# Patient Record
Sex: FEMALE | Race: WHITE | NOT HISPANIC OR LATINO | Employment: OTHER | URBAN - METROPOLITAN AREA
[De-identification: names, ages, dates, MRNs, and addresses within clinical notes are randomized per-mention and may not be internally consistent; named-entity substitution may affect disease eponyms.]

---

## 2017-11-21 ENCOUNTER — HOSPITAL ENCOUNTER (EMERGENCY)
Facility: HOSPITAL | Age: 72
Discharge: HOME | End: 2017-11-21
Attending: EMERGENCY MEDICINE | Admitting: EMERGENCY MEDICINE
Payer: MEDICARE

## 2017-11-21 ENCOUNTER — APPOINTMENT (EMERGENCY)
Dept: RADIOLOGY | Facility: HOSPITAL | Age: 72
End: 2017-11-21
Payer: MEDICARE

## 2017-11-21 VITALS
RESPIRATION RATE: 18 BRPM | OXYGEN SATURATION: 94 % | TEMPERATURE: 97.9 F | BODY MASS INDEX: 38.18 KG/M2 | DIASTOLIC BLOOD PRESSURE: 93 MMHG | SYSTOLIC BLOOD PRESSURE: 190 MMHG | HEART RATE: 68 BPM | WEIGHT: 194.45 LBS | HEIGHT: 60 IN

## 2017-11-21 DIAGNOSIS — R07.89 CHEST WALL PAIN: Primary | ICD-10-CM

## 2017-11-21 DIAGNOSIS — J40 BRONCHITIS: ICD-10-CM

## 2017-11-21 LAB
ALBUMIN SERPL BCP-MCNC: 3.8 G/DL (ref 3.5–5)
ALP SERPL-CCNC: 55 U/L (ref 46–116)
ALT SERPL W P-5'-P-CCNC: 20 U/L (ref 12–78)
ANION GAP SERPL CALCULATED.3IONS-SCNC: 11 MMOL/L (ref 4–13)
AST SERPL W P-5'-P-CCNC: 12 U/L (ref 5–45)
BASOPHILS # BLD AUTO: 0.03 THOUSANDS/ΜL (ref 0–0.1)
BASOPHILS NFR BLD AUTO: 1 % (ref 0–1)
BILIRUB DIRECT SERPL-MCNC: 0.12 MG/DL (ref 0–0.2)
BILIRUB SERPL-MCNC: 0.2 MG/DL (ref 0.2–1)
BUN SERPL-MCNC: 32 MG/DL (ref 5–25)
CALCIUM SERPL-MCNC: 9.5 MG/DL (ref 8.3–10.1)
CHLORIDE SERPL-SCNC: 109 MMOL/L (ref 100–108)
CO2 SERPL-SCNC: 26 MMOL/L (ref 21–32)
CREAT SERPL-MCNC: 1.43 MG/DL (ref 0.6–1.3)
EOSINOPHIL # BLD AUTO: 0.28 THOUSAND/ΜL (ref 0–0.61)
EOSINOPHIL NFR BLD AUTO: 5 % (ref 0–6)
ERYTHROCYTE [DISTWIDTH] IN BLOOD BY AUTOMATED COUNT: 12 % (ref 11.6–15.1)
GFR SERPL CREATININE-BSD FRML MDRD: 37 ML/MIN/1.73SQ M
GLUCOSE SERPL-MCNC: 117 MG/DL (ref 65–140)
HCT VFR BLD AUTO: 38.2 % (ref 34.8–46.1)
HGB BLD-MCNC: 12.6 G/DL (ref 11.5–15.4)
INR PPP: 1.01 (ref 0.86–1.16)
LYMPHOCYTES # BLD AUTO: 2.22 THOUSANDS/ΜL (ref 0.6–4.47)
LYMPHOCYTES NFR BLD AUTO: 40 % (ref 14–44)
MCH RBC QN AUTO: 33.2 PG (ref 26.8–34.3)
MCHC RBC AUTO-ENTMCNC: 33 G/DL (ref 31.4–37.4)
MCV RBC AUTO: 101 FL (ref 82–98)
MONOCYTES # BLD AUTO: 0.53 THOUSAND/ΜL (ref 0.17–1.22)
MONOCYTES NFR BLD AUTO: 9 % (ref 4–12)
NEUTROPHILS # BLD AUTO: 2.52 THOUSANDS/ΜL (ref 1.85–7.62)
NEUTS SEG NFR BLD AUTO: 45 % (ref 43–75)
NRBC BLD AUTO-RTO: 0 /100 WBCS
PLATELET # BLD AUTO: 134 THOUSANDS/UL (ref 149–390)
PMV BLD AUTO: 11.5 FL (ref 8.9–12.7)
POTASSIUM SERPL-SCNC: 3.9 MMOL/L (ref 3.5–5.3)
PROT SERPL-MCNC: 7.6 G/DL (ref 6.4–8.2)
PROTHROMBIN TIME: 13.6 SECONDS (ref 12.1–14.4)
RBC # BLD AUTO: 3.8 MILLION/UL (ref 3.81–5.12)
SODIUM SERPL-SCNC: 146 MMOL/L (ref 136–145)
TROPONIN I SERPL-MCNC: <0.02 NG/ML
TROPONIN I SERPL-MCNC: <0.02 NG/ML
WBC # BLD AUTO: 5.61 THOUSAND/UL (ref 4.31–10.16)

## 2017-11-21 PROCEDURE — 80048 BASIC METABOLIC PNL TOTAL CA: CPT | Performed by: EMERGENCY MEDICINE

## 2017-11-21 PROCEDURE — 71020 HB CHEST X-RAY 2VW FRONTAL&LATL: CPT

## 2017-11-21 PROCEDURE — 85025 COMPLETE CBC W/AUTO DIFF WBC: CPT | Performed by: EMERGENCY MEDICINE

## 2017-11-21 PROCEDURE — 80076 HEPATIC FUNCTION PANEL: CPT | Performed by: EMERGENCY MEDICINE

## 2017-11-21 PROCEDURE — 93005 ELECTROCARDIOGRAM TRACING: CPT | Performed by: EMERGENCY MEDICINE

## 2017-11-21 PROCEDURE — 99285 EMERGENCY DEPT VISIT HI MDM: CPT

## 2017-11-21 PROCEDURE — 36415 COLL VENOUS BLD VENIPUNCTURE: CPT | Performed by: EMERGENCY MEDICINE

## 2017-11-21 PROCEDURE — 84484 ASSAY OF TROPONIN QUANT: CPT | Performed by: EMERGENCY MEDICINE

## 2017-11-21 PROCEDURE — 85610 PROTHROMBIN TIME: CPT | Performed by: EMERGENCY MEDICINE

## 2017-11-21 RX ORDER — AZITHROMYCIN 200 MG/5ML
500 POWDER, FOR SUSPENSION ORAL ONCE
Status: COMPLETED | OUTPATIENT
Start: 2017-11-21 | End: 2017-11-21

## 2017-11-21 RX ORDER — AZITHROMYCIN 250 MG/1
250 TABLET, FILM COATED ORAL DAILY
Qty: 4 TABLET | Refills: 0 | Status: SHIPPED | OUTPATIENT
Start: 2017-11-21 | End: 2017-11-25

## 2017-11-21 RX ORDER — CLONIDINE HYDROCHLORIDE 0.1 MG/1
0.1 TABLET ORAL ONCE
Status: COMPLETED | OUTPATIENT
Start: 2017-11-21 | End: 2017-11-21

## 2017-11-21 RX ADMIN — AZITHROMYCIN 500 MG: 200 POWDER, FOR SUSPENSION ORAL at 17:41

## 2017-11-21 RX ADMIN — CLONIDINE HYDROCHLORIDE 0.1 MG: 0.1 TABLET ORAL at 17:36

## 2017-11-21 NOTE — DISCHARGE INSTRUCTIONS
Acute Bronchitis   WHAT YOU NEED TO KNOW:   Acute bronchitis is swelling and irritation in the air passages of your lungs  This irritation may cause you to cough or have other breathing problems  Acute bronchitis often starts because of another illness, such as a cold or the flu  The illness spreads from your nose and throat to your windpipe and airways  Bronchitis is often called a chest cold  Acute bronchitis lasts about 3 to 6 weeks and is usually not a serious illness  Your cough can last for several weeks  DISCHARGE INSTRUCTIONS:   Return to the emergency department if:   · You cough up blood  · Your lips or fingernails turn blue  · You feel like you are not getting enough air when you breathe  Contact your healthcare provider if:   · You have a fever  · Your breathing problems do not go away or get worse  · Your cough does not get better within 4 weeks  · You have questions or concerns about your condition or care  Self-care:   · Get more rest   Rest helps your body to heal  Slowly start to do more each day  Rest when you feel it is needed  · Avoid irritants in the air  Avoid chemicals, fumes, and dust  Wear a face mask if you must work around dust or fumes  Stay inside on days when air pollution levels are high  If you have allergies, stay inside when pollen counts are high  Do not use aerosol products, such as spray-on deodorant, bug spray, and hair spray  · Do not smoke or be around others who smoke  Nicotine and other chemicals in cigarettes and cigars damages the cilia that move mucus out of your lungs  Ask your healthcare provider for information if you currently smoke and need help to quit  E-cigarettes or smokeless tobacco still contain nicotine  Talk to your healthcare provider before you use these products  · Drink liquids as directed  Liquids help keep your air passages moist and help you cough up mucus   You may need to drink more liquids when you have acute bronchitis  Ask how much liquid to drink each day and which liquids are best for you  · Use a humidifier or vaporizer  Use a cool mist humidifier or a vaporizer to increase air moisture in your home  This may make it easier for you to breathe and help decrease your cough  Decrease risk for acute bronchitis:   · Get the vaccinations you need  Ask your healthcare provider if you should get vaccinated against the flu or pneumonia  · Prevent the spread of germs  You can decrease your risk of acute bronchitis and other illnesses by doing the following:     [facility] [facility] your hands often with soap and water  Carry germ-killing hand lotion or gel with you  You can use the lotion or gel to clean your hands when soap and water are not available  ¨ Do not touch your eyes, nose, or mouth unless you have washed your hands first     ¨ Always cover your mouth when you cough to prevent the spread of germs  It is best to cough into a tissue or your shirt sleeve instead of into your hand  Ask those around you cover their mouths when they cough  ¨ Try to avoid people who have a cold or the flu  If you are sick, stay away from others as much as possible  Medicines: Your healthcare provider may  give you any of the following:  · Ibuprofen or acetaminophen  are medicines that help lower your fever  They are available without a doctor's order  Ask your healthcare provider which medicine is right for you  Ask how much to take and how often to take it  Follow directions  These medicines can cause stomach bleeding if not taken correctly  Ibuprofen can cause kidney damage  Do not take ibuprofen if you have kidney disease, an ulcer, or allergies to aspirin  Acetaminophen can cause liver damage  Do not take more than 4,000 milligrams in 24 hours  · Decongestants  help loosen mucus in your lungs and make it easier to cough up  This can help you breathe easier  · Cough suppressants  decrease your urge to cough   If your cough produces mucus, do not take a cough suppressant unless your healthcare provider tells you to  Your healthcare provider may suggest that you take a cough suppressant at night so you can rest     · Inhalers  may be given  Your healthcare provider may give you one or more inhalers to help you breathe easier and cough less  An inhaler gives your medicine to open your airways  Ask your healthcare provider to show you how to use your inhaler correctly  · Take your medicine as directed  Contact your healthcare provider if you think your medicine is not helping or if you have side effects  Tell him of her if you are allergic to any medicine  Keep a list of the medicines, vitamins, and herbs you take  Include the amounts, and when and why you take them  Bring the list or the pill bottles to follow-up visits  Carry your medicine list with you in case of an emergency  Follow up with your healthcare provider as directed:  Write down questions you have so you will remember to ask them during your follow-up visits  © 2017 [address] [employer] Information is for End User's use only and may not be sold, redistributed or otherwise used for commercial purposes  All illustrations and images included in CareNotes® are the copyrighted property of [employer]  or Ronald King  The above information is an  only  It is not intended as medical advice for individual conditions or treatments  Talk to your doctor, nurse or pharmacist before following any medical regimen to see if it is safe and effective for you  Chest Wall Pain   WHAT YOU NEED TO KNOW:   Chest wall pain may be caused by problems with the muscles, cartilage, or bones of the chest wall  Chest wall pain may also be caused by pain that spreads to your chest from another part of your body  The pain may be aching, severe, dull, or sharp  It may come and go, or it may be constant   The pain may be worse when you move in certain ways, breathe deeply, or cough  DISCHARGE INSTRUCTIONS:   Call 911 if:   · You have any of the following signs of a heart attack:      ¨ Squeezing, pressure, or pain in your chest that lasts longer than 5 minutes or returns    ¨ Discomfort or pain in your back, neck, jaw, stomach, or arm     ¨ Trouble breathing    ¨ Nausea or vomiting    ¨ Lightheadedness or a sudden cold sweat, especially with chest pain or trouble breathing    Return to the emergency department if:   · You have severe pain  Contact your healthcare provider if:   · You develop a rash  · You have other new symptoms  · Your pain does not improve, even with treatment  · You have questions or concerns about your condition or care  Medicines: You may need any of the following:  · NSAIDs , such as ibuprofen, help decrease swelling, pain, and fever  This medicine is available with or without a doctor's order  NSAIDs can cause stomach bleeding or kidney problems in certain people  If you take blood thinner medicine, always ask your healthcare provider if NSAIDs are safe for you  Always read the medicine label and follow directions  · Acetaminophen  decreases pain  It is available without a doctor's order  Ask how much to take and how often to take it  Follow directions  Acetaminophen can cause liver damage if not taken correctly  · A cream  may be applied to your chest to decrease pain  · Take your medicine as directed  Contact your healthcare provider if you think your medicine is not helping or if you have side effects  Tell him of her if you are allergic to any medicine  Keep a list of the medicines, vitamins, and herbs you take  Include the amounts, and when and why you take them  Bring the list or the pill bottles to follow-up visits  Carry your medicine list with you in case of an emergency  Follow up with your healthcare provider as directed:  Write down your questions so you remember to ask them during your visits  Self-care:   · Rest  as needed  Avoid activities that make your chest wall pain worse  · Apply heat  on your chest for 20 to 30 minutes every 2 hours for as many days as directed  Heat helps decrease pain and muscle spasms  · Apply ice  on your chest for 15 to 20 minutes every hour or as directed  Use an ice pack, or put crushed ice in a plastic bag  Cover it with a towel  Ice helps prevent tissue damage and decreases swelling and pain  © 2017 [address] [employer]  Information is for End User's use only and may not be sold, redistributed or otherwise used for commercial purposes  All illustrations and images included in CareNotes® are the copyrighted property of A [employer] A M , Inc  or Ronald King  The above information is an  only  It is not intended as medical advice for individual conditions or treatments  Talk to your doctor, nurse or pharmacist before following any medical regimen to see if it is safe and effective for you

## 2017-11-21 NOTE — ED PROVIDER NOTES
History  Chief Complaint   Patient presents with    Chest Pain     New onset, began 12 today  Pt has cardiac hx  HPI   Patient is a 81-year-old female, history of 3 heart attacks, 5 stents, complains of anterior chest pain that started approximately 245 today  Patient is at [facility] daughter reports patient has some dementia  Patient reports her pain is anterior, somewhat different than her normal chest pain  Patient's daughter reports that she is coughing fairly frequently, apparently recently started on a nebulizer because the frequent coughing with some wheezing  Apparently blood pressure was elevated today, she had some anterior chest pain  The patient has dementia and the family expressed difficult in understanding if her pain is  ischemic or musculoskeletal   Advised to come to the emergency department by the nursing home  Past medical history of coronary artery disease with stents  Family history noncontributory  Social history, lives at a nursing home    None       Past Medical History:   Diagnosis Date    Brain aneurysm     Cardiac disease     [facility]     [facility] [address] [facility]     Dementia     Disease of thyroid gland     Hyperlipidemia     Hypertension     MI (myocardial infarction)     Migraine     Renal disorder     [facility]     [facility]        Past Surgical History:   Procedure Laterality Date    APPENDECTOMY      BLADDER TUMOR EXCISION      BRAIN SURGERY      CARDIAC SURGERY      CORONARY STENT PLACEMENT      5 stents    EYE SURGERY      MANDIBLE FRACTURE SURGERY      TONSILLECTOMY      TUBAL LIGATION      UTERINE FIBROID SURGERY         History reviewed  No pertinent family history  I have reviewed and agree with the history as documented      Social History   Substance Use Topics    Smoking status: Former Smoker    Smokeless tobacco: Never Used    Alcohol use No        Review of Systems   Constitutional: Negative for diaphoresis, fatigue and fever  HENT: Negative for congestion, ear pain, nosebleeds and sore throat  Eyes: Negative for photophobia, pain, discharge and visual disturbance  Respiratory: Negative for cough, choking, chest tightness, shortness of breath and wheezing  Cardiovascular: Positive for chest pain  Negative for palpitations  Gastrointestinal: Negative for abdominal distention, abdominal pain, diarrhea and vomiting  Genitourinary: Negative for dysuria, flank pain and frequency  Musculoskeletal: Negative for back pain, gait problem and joint swelling  Skin: Negative for color change and rash  Neurological: Negative for dizziness, syncope and headaches  Psychiatric/Behavioral: Negative for behavioral problems and confusion  The patient is not nervous/anxious  All other systems reviewed and are negative  Physical Exam  ED Triage Vitals [11/21/17 1550]   Temperature Pulse Respirations Blood Pressure SpO2   97 9 °F (36 6 °C) 66 19 (!) 219/102 91 %      Temp Source Heart Rate Source Patient Position - Orthostatic VS BP Location FiO2 (%)   Oral Monitor Lying Right arm --      Pain Score       No Pain           Orthostatic Vital Signs  Vitals:    11/21/17 1550 11/21/17 1736 11/21/17 1852   BP: (!) 219/102 (!) 239/108 (!) 190/93   Pulse: 66  68   Patient Position - Orthostatic VS: Lying  Sitting       Physical Exam   Constitutional: She appears well-developed and well-nourished  HENT:   Head: Normocephalic  Right Ear: External ear normal    Left Ear: External ear normal    Nose: Nose normal    Mouth/Throat: Oropharynx is clear and moist    Eyes: EOM and lids are normal  Pupils are equal, round, and reactive to light  Neck: Normal range of motion  Neck supple  Cardiovascular: Normal rate, regular rhythm, normal heart sounds and intact distal pulses  Pulmonary/Chest: Effort normal and breath sounds normal  No respiratory distress  Abdominal: Soft  Bowel sounds are normal  She exhibits no distension  There is no tenderness  Musculoskeletal: Normal range of motion  She exhibits no deformity  Neurological: She is alert  She has normal strength  No cranial nerve deficit or sensory deficit  Oriented to person and place but not time   Skin: Skin is warm and dry  Psychiatric: She has a normal mood and affect  Nursing note and vitals reviewed  Pulse oximetry 94% on room air adequate oxygenation, no hypoxia    ED Medications  Medications   cloNIDine (CATAPRES) tablet 0 1 mg (0 1 mg Oral Given 11/21/17 1736)   azithromycin (ZITHROMAX) oral suspension 500 mg (500 mg Oral Given 11/21/17 1741)       Diagnostic Studies  Results Reviewed     Procedure Component Value Units Date/Time    Troponin I [[id number]]  (Normal) Collected:  11/21/17 1748    Lab Status:  Final result Specimen:  Blood from Arm, Left Updated:  11/21/17 1811     Troponin I <0 02 ng/mL     Narrative:         [employer] Chemistry analyzer 99% cutoff is > 0 04 ng/mL in network labs    o cTnI 99% cutoff is useful only when applied to patients in the clinical setting of myocardial ischemia  o cTnI 99% cutoff should be interpreted in the context of clinical history, ECG findings and possibly cardiac imaging to establish correct diagnosis  o cTnI 99% cutoff may be suggestive but clearly not indicative of a coronary event without the clinical setting of myocardial ischemia      CBC and differential [[id number]]  (Abnormal) Collected:  11/21/17 1640    Lab Status:  Final result Specimen:  Blood from Arm, Left Updated:  11/21/17 1647     WBC 5 61 Thousand/uL      RBC 3 80 (L) Million/uL      Hemoglobin 12 6 g/dL      Hematocrit 38 2 %       (H) fL      MCH 33 2 pg      MCHC 33 0 g/dL      RDW 12 0 %      MPV 11 5 fL      Platelets 528 (L) Thousands/uL      nRBC 0 /100 WBCs      Neutrophils Relative 45 %      Lymphocytes Relative 40 %      Monocytes Relative 9 %      Eosinophils Relative 5 %      Basophils Relative 1 %      Neutrophils Absolute 2 52 Thousands/µL      Lymphocytes Absolute 2 22 Thousands/µL      Monocytes Absolute 0 53 Thousand/µL      Eosinophils Absolute 0 28 Thousand/µL      Basophils Absolute 0 03 Thousands/µL     Troponin I [[id number]]  (Normal) Collected:  11/21/17 1605    Lab Status:  Final result Specimen:  Blood from Arm, Right Updated:  11/21/17 1636     Troponin I <0 02 ng/mL     Narrative:         [employer] Chemistry analyzer 99% cutoff is > 0 04 ng/mL in network labs    o cTnI 99% cutoff is useful only when applied to patients in the clinical setting of myocardial ischemia  o cTnI 99% cutoff should be interpreted in the context of clinical history, ECG findings and possibly cardiac imaging to establish correct diagnosis  o cTnI 99% cutoff may be suggestive but clearly not indicative of a coronary event without the clinical setting of myocardial ischemia  Basic metabolic panel [[id number]]  (Abnormal) Collected:  11/21/17 1605    Lab Status:  Final result Specimen:  Blood from Arm, Right Updated:  11/21/17 1631     Sodium 146 (H) mmol/L      Potassium 3 9 mmol/L      Chloride 109 (H) mmol/L      CO2 26 mmol/L      Anion Gap 11 mmol/L      BUN 32 (H) mg/dL      Creatinine 1 43 (H) mg/dL      Glucose 117 mg/dL      Calcium 9 5 mg/dL      eGFR 37 ml/min/1 73sq m     Narrative:         National Kidney Disease Education Program recommendations are as follows:  GFR calculation is accurate only with a steady state creatinine  Chronic Kidney disease less than 60 ml/min/1 73 sq  meters  Kidney failure less than 15 ml/min/1 73 sq  meters      Hepatic function panel [[id number]]  (Normal) Collected:  11/21/17 1605    Lab Status:  Final result Specimen:  Blood from Arm, Right Updated:  11/21/17 1631     Total Bilirubin 0 20 mg/dL      Bilirubin, Direct 0 12 mg/dL      Alkaline Phosphatase 55 U/L      AST 12 U/L      ALT 20 U/L      Total Protein 7 6 g/dL      Albumin 3 8 g/dL Protime-INR [[id number]]  (Normal) Collected:  11/21/17 1605    Lab Status:  Final result Specimen:  Blood from Arm, Right Updated:  11/21/17 1625     Protime 13 6 seconds      INR 1 01                 XR chest pa & lateral   Final Result by Chana Valdez MD (11/21 1652)   Strand-like opacity at left lung base most consistent with subsegmental atelectasis      Findings personally discussed by phone with Dr Lange Pap at 4:50 PM, 11/21/2017         Workstation performed: [id number]                    Procedures  ECG 12 Lead Documentation  Date/Time: 11/21/2017 4:00 PM  Performed by: Garry Ramon by: Oziel Foster     Indications / Diagnosis:  Chest pain  ECG reviewed by me, the ED Provider: yes    Patient location:  ED  Previous ECG:     Previous ECG:  Unavailable  Interpretation:     Interpretation: non-specific    Rate:     ECG rate:  Sixty-eight  Rhythm:     Rhythm: sinus rhythm    Ectopy:     Ectopy: none    ST segments:     ST segments:  Non-specific  Comments:      Normal sinus rhythm, no acute ST-T wave changes, no old tracings           Phone Contacts  ED Phone Contact    ED Course  ED Course          HEART Risk Score    Flowsheet Row Most Recent Value   History  0 Filed at: 11/21/2017 2114   ECG  0 Filed at: 11/21/2017 2114   Age  2 Filed at: 11/21/2017 2114   Risk Factors  2 Filed at: 11/21/2017 2114   Troponin  0 Filed at: 11/21/2017 2114   Heart Score Risk Calculator   History  0 Filed at: 11/21/2017 2114   ECG  0 Filed at: 11/21/2017 2114   Age  2 Filed at: 11/21/2017 2114   Risk Factors  2 Filed at: 11/21/2017 2114   Troponin  0 Filed at: 11/21/2017 2114   HEART Score  4 Filed at: 11/21/2017 2114   HEART Score  4 Filed at: 11/21/2017 2114           diagnostic testing showed normal white count of 5 6  No sign of inflammation, hemoglobin was normal at 12 no sign of anemia  Cardiac troponin was normal and repeat cardiac troponin at 3 hours   post pain was normal no sign of cardiac ischemia  Chest x-ray showed a possible left lower lobe infiltrate versus atelectasis, reviewed with the daughter, we start the patient on antibiotics  Patient was hypertensive, diastolic blood pressure 93 on discharge  I discussed with the daughter, they will continue her hypertensive medications  MDM medical decision making 52-year-old female presents with some anterior chest pain recent cough and congestion, no sign of cardiac ischemia can't EKG showed no acute ST elevations, cardiac troponin was negative no sign of cardiac ischemia  Discussed with radiology, no definite pneumonia atelectasis at the left base, discussed with the daughter also  Will treat with antibiotics  Blood pressure seems to be somewhat improved on discharge  Discussed with the daughter she will follow up  We discussed indications to return and follow-up  No indication for admission at this time she agrees  CritCare Time    Disposition  Final diagnoses:   Chest wall pain   Bronchitis     Time reflects when diagnosis was documented in both MDM as applicable and the Disposition within this note     Time User Action Codes Description Comment    11/21/2017  6:04 PM Farhana Negrete [R07 [id number]] Chest wall pain     11/21/2017  6:04 PM Deidre Venegas Cir Bronchitis       ED Disposition     ED Disposition Condition Comment    Discharge  [facility] discharge to home/self care  Condition at discharge: Good        Follow-up Information     Follow up With Specialties Details Why Contact Savannah Barragan, [address] [phone]          Discharge Medication List as of 11/21/2017  6:28 PM      START taking these medications    Details   azithromycin (ZITHROMAX) 250 mg tablet Take 1 tablet by mouth daily for 4 days, Starting Tue 11/21/2017, Until Sat 11/25/2017, Print           No discharge procedures on file      ED Provider  Electronically Signed by           Jayda Vernon MD  11/21/17 9079

## 2017-11-22 LAB
ATRIAL RATE: 68 BPM
P AXIS: 70 DEGREES
PR INTERVAL: 188 MS
QRS AXIS: 31 DEGREES
QRSD INTERVAL: 102 MS
QT INTERVAL: 440 MS
QTC INTERVAL: 467 MS
T WAVE AXIS: 56 DEGREES
VENTRICULAR RATE: 68 BPM

## 2017-12-10 ENCOUNTER — APPOINTMENT (EMERGENCY)
Dept: CT IMAGING | Facility: HOSPITAL | Age: 72
DRG: 698 | End: 2017-12-10
Payer: MEDICARE

## 2017-12-10 ENCOUNTER — HOSPITAL ENCOUNTER (INPATIENT)
Facility: HOSPITAL | Age: 72
LOS: 4 days | Discharge: HOME | DRG: 698 | End: 2017-12-14
Attending: EMERGENCY MEDICINE | Admitting: INTERNAL MEDICINE
Payer: MEDICARE

## 2017-12-10 DIAGNOSIS — N28.9 ACUTE ON CHRONIC RENAL INSUFFICIENCY: ICD-10-CM

## 2017-12-10 DIAGNOSIS — R77.8 ELEVATED TROPONIN: ICD-10-CM

## 2017-12-10 DIAGNOSIS — I10 UNCONTROLLED HYPERTENSION: ICD-10-CM

## 2017-12-10 DIAGNOSIS — N18.9 ACUTE ON CHRONIC RENAL INSUFFICIENCY: ICD-10-CM

## 2017-12-10 DIAGNOSIS — R10.2 SUPRAPUBIC PAIN: ICD-10-CM

## 2017-12-10 DIAGNOSIS — Z97.8 CHRONIC INDWELLING FOLEY CATHETER: ICD-10-CM

## 2017-12-10 DIAGNOSIS — N30.00 ACUTE CYSTITIS: Primary | ICD-10-CM

## 2017-12-10 PROBLEM — J44.9 COPD (CHRONIC OBSTRUCTIVE PULMONARY DISEASE) (HCC): Chronic | Status: ACTIVE | Noted: 2017-12-10

## 2017-12-10 PROBLEM — N30.90 CYSTITIS: Status: ACTIVE | Noted: 2017-12-10

## 2017-12-10 PROBLEM — N17.9 AKI (ACUTE KIDNEY INJURY) (HCC): Status: ACTIVE | Noted: 2017-12-10

## 2017-12-10 PROBLEM — I50.9 CHF (CONGESTIVE HEART FAILURE) (HCC): Chronic | Status: ACTIVE | Noted: 2017-12-10

## 2017-12-10 LAB
ALBUMIN SERPL BCP-MCNC: 3.6 G/DL (ref 3.5–5)
ALP SERPL-CCNC: 55 U/L (ref 46–116)
ALT SERPL W P-5'-P-CCNC: 18 U/L (ref 12–78)
ANION GAP SERPL CALCULATED.3IONS-SCNC: 11 MMOL/L (ref 4–13)
APTT PPP: 25 SECONDS (ref 23–35)
AST SERPL W P-5'-P-CCNC: 14 U/L (ref 5–45)
BACTERIA UR QL AUTO: ABNORMAL /HPF
BASOPHILS # BLD AUTO: 0.03 THOUSANDS/ΜL (ref 0–0.1)
BASOPHILS NFR BLD AUTO: 0 % (ref 0–1)
BILIRUB SERPL-MCNC: 0.2 MG/DL (ref 0.2–1)
BILIRUB UR QL STRIP: NEGATIVE
BUN SERPL-MCNC: 31 MG/DL (ref 5–25)
CALCIUM SERPL-MCNC: 9.8 MG/DL (ref 8.3–10.1)
CHLORIDE SERPL-SCNC: 109 MMOL/L (ref 100–108)
CLARITY UR: CLEAR
CO2 SERPL-SCNC: 27 MMOL/L (ref 21–32)
COLOR UR: YELLOW
CREAT SERPL-MCNC: 1.73 MG/DL (ref 0.6–1.3)
EOSINOPHIL # BLD AUTO: 0.3 THOUSAND/ΜL (ref 0–0.61)
EOSINOPHIL NFR BLD AUTO: 4 % (ref 0–6)
ERYTHROCYTE [DISTWIDTH] IN BLOOD BY AUTOMATED COUNT: 12.2 % (ref 11.6–15.1)
GFR SERPL CREATININE-BSD FRML MDRD: 29 ML/MIN/1.73SQ M
GLUCOSE SERPL-MCNC: 166 MG/DL (ref 65–140)
GLUCOSE UR STRIP-MCNC: NEGATIVE MG/DL
HCT VFR BLD AUTO: 39.6 % (ref 34.8–46.1)
HGB BLD-MCNC: 12.9 G/DL (ref 11.5–15.4)
HGB UR QL STRIP.AUTO: ABNORMAL
INR PPP: 1.05 (ref 0.86–1.16)
KETONES UR STRIP-MCNC: NEGATIVE MG/DL
LACTATE SERPL-SCNC: 1.3 MMOL/L (ref 0.5–2)
LEUKOCYTE ESTERASE UR QL STRIP: ABNORMAL
LIPASE SERPL-CCNC: 232 U/L (ref 73–393)
LYMPHOCYTES # BLD AUTO: 1.83 THOUSANDS/ΜL (ref 0.6–4.47)
LYMPHOCYTES NFR BLD AUTO: 22 % (ref 14–44)
MAGNESIUM SERPL-MCNC: 2 MG/DL (ref 1.6–2.6)
MCH RBC QN AUTO: 33.1 PG (ref 26.8–34.3)
MCHC RBC AUTO-ENTMCNC: 32.6 G/DL (ref 31.4–37.4)
MCV RBC AUTO: 102 FL (ref 82–98)
MONOCYTES # BLD AUTO: 0.79 THOUSAND/ΜL (ref 0.17–1.22)
MONOCYTES NFR BLD AUTO: 10 % (ref 4–12)
NEUTROPHILS # BLD AUTO: 5.3 THOUSANDS/ΜL (ref 1.85–7.62)
NEUTS SEG NFR BLD AUTO: 64 % (ref 43–75)
NITRITE UR QL STRIP: POSITIVE
NON-SQ EPI CELLS URNS QL MICRO: ABNORMAL /HPF
NRBC BLD AUTO-RTO: 0 /100 WBCS
NT-PROBNP SERPL-MCNC: 958 PG/ML
PH UR STRIP.AUTO: 7 [PH] (ref 4.5–8)
PLATELET # BLD AUTO: 153 THOUSANDS/UL (ref 149–390)
PMV BLD AUTO: 11.7 FL (ref 8.9–12.7)
POTASSIUM SERPL-SCNC: 3.8 MMOL/L (ref 3.5–5.3)
PROT SERPL-MCNC: 7.6 G/DL (ref 6.4–8.2)
PROT UR STRIP-MCNC: ABNORMAL MG/DL
PROTHROMBIN TIME: 13.9 SECONDS (ref 12.1–14.4)
RBC # BLD AUTO: 3.9 MILLION/UL (ref 3.81–5.12)
RBC #/AREA URNS AUTO: ABNORMAL /HPF
SODIUM SERPL-SCNC: 147 MMOL/L (ref 136–145)
SP GR UR STRIP.AUTO: 1.02 (ref 1–1.03)
TROPONIN I SERPL-MCNC: 0.08 NG/ML
UROBILINOGEN UR QL STRIP.AUTO: 0.2 E.U./DL
WBC # BLD AUTO: 8.3 THOUSAND/UL (ref 4.31–10.16)
WBC #/AREA URNS AUTO: ABNORMAL /HPF
WBC CLUMPS # UR AUTO: PRESENT /UL

## 2017-12-10 PROCEDURE — 87040 BLOOD CULTURE FOR BACTERIA: CPT | Performed by: EMERGENCY MEDICINE

## 2017-12-10 PROCEDURE — 83605 ASSAY OF LACTIC ACID: CPT | Performed by: EMERGENCY MEDICINE

## 2017-12-10 PROCEDURE — 83735 ASSAY OF MAGNESIUM: CPT | Performed by: EMERGENCY MEDICINE

## 2017-12-10 PROCEDURE — 81001 URINALYSIS AUTO W/SCOPE: CPT | Performed by: EMERGENCY MEDICINE

## 2017-12-10 PROCEDURE — 70450 CT HEAD/BRAIN W/O DYE: CPT

## 2017-12-10 PROCEDURE — 85610 PROTHROMBIN TIME: CPT | Performed by: EMERGENCY MEDICINE

## 2017-12-10 PROCEDURE — 85730 THROMBOPLASTIN TIME PARTIAL: CPT | Performed by: EMERGENCY MEDICINE

## 2017-12-10 PROCEDURE — 84484 ASSAY OF TROPONIN QUANT: CPT | Performed by: EMERGENCY MEDICINE

## 2017-12-10 PROCEDURE — 87186 SC STD MICRODIL/AGAR DIL: CPT | Performed by: EMERGENCY MEDICINE

## 2017-12-10 PROCEDURE — 93005 ELECTROCARDIOGRAM TRACING: CPT | Performed by: EMERGENCY MEDICINE

## 2017-12-10 PROCEDURE — 74176 CT ABD & PELVIS W/O CONTRAST: CPT

## 2017-12-10 PROCEDURE — 87086 URINE CULTURE/COLONY COUNT: CPT | Performed by: EMERGENCY MEDICINE

## 2017-12-10 PROCEDURE — 96360 HYDRATION IV INFUSION INIT: CPT

## 2017-12-10 PROCEDURE — 80053 COMPREHEN METABOLIC PANEL: CPT | Performed by: EMERGENCY MEDICINE

## 2017-12-10 PROCEDURE — 83880 ASSAY OF NATRIURETIC PEPTIDE: CPT | Performed by: EMERGENCY MEDICINE

## 2017-12-10 PROCEDURE — 36415 COLL VENOUS BLD VENIPUNCTURE: CPT | Performed by: EMERGENCY MEDICINE

## 2017-12-10 PROCEDURE — 71250 CT THORAX DX C-: CPT

## 2017-12-10 PROCEDURE — 85025 COMPLETE CBC W/AUTO DIFF WBC: CPT | Performed by: EMERGENCY MEDICINE

## 2017-12-10 PROCEDURE — 83690 ASSAY OF LIPASE: CPT | Performed by: EMERGENCY MEDICINE

## 2017-12-10 PROCEDURE — 0T2BX0Z CHANGE DRAINAGE DEVICE IN BLADDER, EXTERNAL APPROACH: ICD-10-PCS | Performed by: EMERGENCY MEDICINE

## 2017-12-10 PROCEDURE — 87077 CULTURE AEROBIC IDENTIFY: CPT | Performed by: EMERGENCY MEDICINE

## 2017-12-10 RX ORDER — MULTIVIT-MIN/IRON/FOLIC ACID/K 18-600-40
1 CAPSULE ORAL DAILY
Status: ON HOLD | COMMUNITY
End: 2018-12-14

## 2017-12-10 RX ORDER — CLONIDINE HYDROCHLORIDE 0.1 MG/1
0.2 TABLET ORAL
COMMUNITY
End: 2018-03-30 | Stop reason: HOSPADM

## 2017-12-10 RX ORDER — SIMVASTATIN 20 MG
20 TABLET ORAL
COMMUNITY
End: 2018-04-13

## 2017-12-10 RX ORDER — FERROUS SULFATE 325(65) MG
325 TABLET ORAL 2 TIMES DAILY
Status: ON HOLD | COMMUNITY
End: 2018-12-14

## 2017-12-10 RX ORDER — LABETALOL HYDROCHLORIDE 5 MG/ML
10 INJECTION, SOLUTION INTRAVENOUS ONCE
Status: COMPLETED | OUTPATIENT
Start: 2017-12-10 | End: 2017-12-10

## 2017-12-10 RX ORDER — ASPIRIN 81 MG/1
81 TABLET ORAL DAILY
COMMUNITY
End: 2018-09-13

## 2017-12-10 RX ORDER — ACETAMINOPHEN 500 MG
500 TABLET ORAL EVERY 6 HOURS PRN
COMMUNITY
End: 2019-10-03

## 2017-12-10 RX ORDER — ALBUTEROL SULFATE 90 UG/1
2 AEROSOL, METERED RESPIRATORY (INHALATION) EVERY 6 HOURS PRN
COMMUNITY
End: 2017-12-10

## 2017-12-10 RX ORDER — CARBAMAZEPINE 100 MG/1
100 TABLET, CHEWABLE ORAL 3 TIMES DAILY
Status: ON HOLD | COMMUNITY
End: 2018-12-14

## 2017-12-10 RX ORDER — FENOFIBRATE 67 MG/1
67 CAPSULE ORAL
Status: ON HOLD | COMMUNITY
End: 2018-12-14

## 2017-12-10 RX ORDER — IPRATROPIUM BROMIDE AND ALBUTEROL SULFATE 2.5; .5 MG/3ML; MG/3ML
3 SOLUTION RESPIRATORY (INHALATION)
COMMUNITY
End: 2018-04-18 | Stop reason: HOSPADM

## 2017-12-10 RX ORDER — LORAZEPAM 0.5 MG/1
0.5 TABLET ORAL 2 TIMES DAILY
COMMUNITY
End: 2018-03-30 | Stop reason: HOSPADM

## 2017-12-10 RX ORDER — ALBUTEROL SULFATE 2.5 MG/3ML
2.5 SOLUTION RESPIRATORY (INHALATION) EVERY 6 HOURS PRN
Status: ON HOLD | COMMUNITY
End: 2018-12-14

## 2017-12-10 RX ORDER — AMLODIPINE BESYLATE 10 MG/1
10 TABLET ORAL DAILY
COMMUNITY
End: 2018-03-30 | Stop reason: HOSPADM

## 2017-12-10 RX ORDER — BUDESONIDE AND FORMOTEROL FUMARATE DIHYDRATE 160; 4.5 UG/1; UG/1
2 AEROSOL RESPIRATORY (INHALATION) 2 TIMES DAILY
COMMUNITY
End: 2018-04-18 | Stop reason: HOSPADM

## 2017-12-10 RX ORDER — LIDOCAINE HYDROCHLORIDE 20 MG/ML
JELLY TOPICAL ONCE
Status: COMPLETED | OUTPATIENT
Start: 2017-12-10 | End: 2017-12-10

## 2017-12-10 RX ORDER — DIVALPROEX SODIUM 250 MG/1
500 TABLET, EXTENDED RELEASE ORAL DAILY
COMMUNITY
End: 2018-04-18 | Stop reason: HOSPADM

## 2017-12-10 RX ORDER — METOPROLOL TARTRATE 50 MG/1
50 TABLET, FILM COATED ORAL EVERY MORNING
COMMUNITY
End: 2017-12-14 | Stop reason: HOSPADM

## 2017-12-10 RX ORDER — ALBUTEROL SULFATE 90 UG/1
2 AEROSOL, METERED RESPIRATORY (INHALATION) EVERY 6 HOURS PRN
Status: ON HOLD | COMMUNITY
End: 2018-12-14

## 2017-12-10 RX ORDER — LEVOTHYROXINE SODIUM 88 UG/1
88 CAPSULE ORAL EVERY MORNING
Status: ON HOLD | COMMUNITY
End: 2018-12-14 | Stop reason: SDUPTHER

## 2017-12-10 RX ORDER — BUPROPION HYDROCHLORIDE 150 MG/1
150 TABLET, EXTENDED RELEASE ORAL DAILY
Status: ON HOLD | COMMUNITY
End: 2018-12-14

## 2017-12-10 RX ADMIN — SODIUM CHLORIDE 500 ML: 0.9 INJECTION, SOLUTION INTRAVENOUS at 21:29

## 2017-12-10 RX ADMIN — CEFTRIAXONE 1000 MG: 1 INJECTION, SOLUTION INTRAVENOUS at 23:05

## 2017-12-10 RX ADMIN — LIDOCAINE HYDROCHLORIDE: 20 JELLY TOPICAL at 22:31

## 2017-12-10 RX ADMIN — LABETALOL 20 MG/4 ML (5 MG/ML) INTRAVENOUS SYRINGE 10 MG: at 22:27

## 2017-12-11 ENCOUNTER — APPOINTMENT (INPATIENT)
Dept: NON INVASIVE DIAGNOSTICS | Facility: HOSPITAL | Age: 72
DRG: 698 | End: 2017-12-11
Payer: MEDICARE

## 2017-12-11 LAB
ANION GAP SERPL CALCULATED.3IONS-SCNC: 10 MMOL/L (ref 4–13)
ATRIAL RATE: 91 BPM
BUN SERPL-MCNC: 29 MG/DL (ref 5–25)
CALCIUM SERPL-MCNC: 9.1 MG/DL (ref 8.3–10.1)
CHLORIDE SERPL-SCNC: 111 MMOL/L (ref 100–108)
CO2 SERPL-SCNC: 26 MMOL/L (ref 21–32)
CREAT SERPL-MCNC: 1.42 MG/DL (ref 0.6–1.3)
ERYTHROCYTE [DISTWIDTH] IN BLOOD BY AUTOMATED COUNT: 12.2 % (ref 11.6–15.1)
GFR SERPL CREATININE-BSD FRML MDRD: 37 ML/MIN/1.73SQ M
GLUCOSE SERPL-MCNC: 112 MG/DL (ref 65–140)
HCT VFR BLD AUTO: 35.7 % (ref 34.8–46.1)
HGB BLD-MCNC: 11.7 G/DL (ref 11.5–15.4)
MCH RBC QN AUTO: 33.2 PG (ref 26.8–34.3)
MCHC RBC AUTO-ENTMCNC: 32.8 G/DL (ref 31.4–37.4)
MCV RBC AUTO: 101 FL (ref 82–98)
P AXIS: 65 DEGREES
PLATELET # BLD AUTO: 133 THOUSANDS/UL (ref 149–390)
PLATELET # BLD AUTO: 134 THOUSANDS/UL (ref 149–390)
PMV BLD AUTO: 11 FL (ref 8.9–12.7)
PMV BLD AUTO: 11.2 FL (ref 8.9–12.7)
POTASSIUM SERPL-SCNC: 3.7 MMOL/L (ref 3.5–5.3)
PR INTERVAL: 178 MS
QRS AXIS: -4 DEGREES
QRSD INTERVAL: 100 MS
QT INTERVAL: 398 MS
QTC INTERVAL: 489 MS
RBC # BLD AUTO: 3.52 MILLION/UL (ref 3.81–5.12)
SODIUM SERPL-SCNC: 147 MMOL/L (ref 136–145)
T WAVE AXIS: 61 DEGREES
TROPONIN I SERPL-MCNC: 0.11 NG/ML
TROPONIN I SERPL-MCNC: 0.11 NG/ML
VENTRICULAR RATE: 91 BPM
WBC # BLD AUTO: 6.53 THOUSAND/UL (ref 4.31–10.16)

## 2017-12-11 PROCEDURE — 85049 AUTOMATED PLATELET COUNT: CPT | Performed by: PHYSICIAN ASSISTANT

## 2017-12-11 PROCEDURE — 80048 BASIC METABOLIC PNL TOTAL CA: CPT | Performed by: INTERNAL MEDICINE

## 2017-12-11 PROCEDURE — 85027 COMPLETE CBC AUTOMATED: CPT | Performed by: INTERNAL MEDICINE

## 2017-12-11 PROCEDURE — 94760 N-INVAS EAR/PLS OXIMETRY 1: CPT

## 2017-12-11 PROCEDURE — 99285 EMERGENCY DEPT VISIT HI MDM: CPT

## 2017-12-11 PROCEDURE — 93306 TTE W/DOPPLER COMPLETE: CPT

## 2017-12-11 PROCEDURE — 84484 ASSAY OF TROPONIN QUANT: CPT | Performed by: PHYSICIAN ASSISTANT

## 2017-12-11 PROCEDURE — 94640 AIRWAY INHALATION TREATMENT: CPT

## 2017-12-11 RX ORDER — BUDESONIDE AND FORMOTEROL FUMARATE DIHYDRATE 160; 4.5 UG/1; UG/1
2 AEROSOL RESPIRATORY (INHALATION) 2 TIMES DAILY
Status: DISCONTINUED | OUTPATIENT
Start: 2017-12-11 | End: 2017-12-14 | Stop reason: HOSPADM

## 2017-12-11 RX ORDER — SODIUM CHLORIDE 450 MG/100ML
75 INJECTION, SOLUTION INTRAVENOUS CONTINUOUS
Status: DISCONTINUED | OUTPATIENT
Start: 2017-12-11 | End: 2017-12-12

## 2017-12-11 RX ORDER — ALBUTEROL SULFATE 2.5 MG/3ML
2.5 SOLUTION RESPIRATORY (INHALATION) EVERY 6 HOURS PRN
Status: DISCONTINUED | OUTPATIENT
Start: 2017-12-11 | End: 2017-12-12

## 2017-12-11 RX ORDER — DIVALPROEX SODIUM 250 MG/1
500 TABLET, EXTENDED RELEASE ORAL DAILY
Status: DISCONTINUED | OUTPATIENT
Start: 2017-12-11 | End: 2017-12-14 | Stop reason: HOSPADM

## 2017-12-11 RX ORDER — FERROUS SULFATE 325(65) MG
325 TABLET ORAL 2 TIMES DAILY
Status: DISCONTINUED | OUTPATIENT
Start: 2017-12-11 | End: 2017-12-14 | Stop reason: HOSPADM

## 2017-12-11 RX ORDER — LORAZEPAM 0.5 MG/1
0.5 TABLET ORAL 2 TIMES DAILY
Status: DISCONTINUED | OUTPATIENT
Start: 2017-12-11 | End: 2017-12-14 | Stop reason: HOSPADM

## 2017-12-11 RX ORDER — PRAVASTATIN SODIUM 40 MG
40 TABLET ORAL
Status: DISCONTINUED | OUTPATIENT
Start: 2017-12-11 | End: 2017-12-14 | Stop reason: HOSPADM

## 2017-12-11 RX ORDER — ASPIRIN 81 MG/1
81 TABLET ORAL DAILY
Status: DISCONTINUED | OUTPATIENT
Start: 2017-12-11 | End: 2017-12-14 | Stop reason: HOSPADM

## 2017-12-11 RX ORDER — BUPROPION HYDROCHLORIDE 150 MG/1
150 TABLET ORAL DAILY
Status: DISCONTINUED | OUTPATIENT
Start: 2017-12-11 | End: 2017-12-14 | Stop reason: HOSPADM

## 2017-12-11 RX ORDER — METOPROLOL SUCCINATE 25 MG/1
50 TABLET, EXTENDED RELEASE ORAL EVERY MORNING
Status: DISCONTINUED | OUTPATIENT
Start: 2017-12-11 | End: 2017-12-14 | Stop reason: HOSPADM

## 2017-12-11 RX ORDER — MELATONIN
2000 DAILY
Status: DISCONTINUED | OUTPATIENT
Start: 2017-12-11 | End: 2017-12-14 | Stop reason: HOSPADM

## 2017-12-11 RX ORDER — ONDANSETRON 2 MG/ML
4 INJECTION INTRAMUSCULAR; INTRAVENOUS EVERY 6 HOURS PRN
Status: DISCONTINUED | OUTPATIENT
Start: 2017-12-11 | End: 2017-12-14 | Stop reason: HOSPADM

## 2017-12-11 RX ORDER — AMOXICILLIN 250 MG
1 CAPSULE ORAL 2 TIMES DAILY
Status: DISCONTINUED | OUTPATIENT
Start: 2017-12-11 | End: 2017-12-14 | Stop reason: HOSPADM

## 2017-12-11 RX ORDER — IPRATROPIUM BROMIDE AND ALBUTEROL SULFATE 2.5; .5 MG/3ML; MG/3ML
3 SOLUTION RESPIRATORY (INHALATION)
Status: DISCONTINUED | OUTPATIENT
Start: 2017-12-12 | End: 2017-12-14

## 2017-12-11 RX ORDER — HEPARIN SODIUM 5000 [USP'U]/ML
5000 INJECTION, SOLUTION INTRAVENOUS; SUBCUTANEOUS EVERY 8 HOURS SCHEDULED
Status: DISCONTINUED | OUTPATIENT
Start: 2017-12-11 | End: 2017-12-14 | Stop reason: HOSPADM

## 2017-12-11 RX ORDER — LEVOTHYROXINE SODIUM 88 UG/1
88 TABLET ORAL
Status: DISCONTINUED | OUTPATIENT
Start: 2017-12-11 | End: 2017-12-14 | Stop reason: HOSPADM

## 2017-12-11 RX ORDER — CLONIDINE HYDROCHLORIDE 0.1 MG/1
0.2 TABLET ORAL
Status: DISCONTINUED | OUTPATIENT
Start: 2017-12-11 | End: 2017-12-14 | Stop reason: HOSPADM

## 2017-12-11 RX ORDER — CARBAMAZEPINE 100 MG/1
100 TABLET, CHEWABLE ORAL 3 TIMES DAILY
Status: DISCONTINUED | OUTPATIENT
Start: 2017-12-11 | End: 2017-12-14 | Stop reason: HOSPADM

## 2017-12-11 RX ORDER — ACETAMINOPHEN 325 MG/1
650 TABLET ORAL EVERY 6 HOURS PRN
Status: DISCONTINUED | OUTPATIENT
Start: 2017-12-11 | End: 2017-12-14 | Stop reason: HOSPADM

## 2017-12-11 RX ORDER — AMLODIPINE BESYLATE 10 MG/1
10 TABLET ORAL DAILY
Status: DISCONTINUED | OUTPATIENT
Start: 2017-12-11 | End: 2017-12-14 | Stop reason: HOSPADM

## 2017-12-11 RX ORDER — METOPROLOL TARTRATE 5 MG/5ML
5 INJECTION INTRAVENOUS EVERY 6 HOURS PRN
Status: DISCONTINUED | OUTPATIENT
Start: 2017-12-11 | End: 2017-12-14 | Stop reason: HOSPADM

## 2017-12-11 RX ORDER — IPRATROPIUM BROMIDE AND ALBUTEROL SULFATE 2.5; .5 MG/3ML; MG/3ML
3 SOLUTION RESPIRATORY (INHALATION)
Status: DISCONTINUED | OUTPATIENT
Start: 2017-12-11 | End: 2017-12-11

## 2017-12-11 RX ADMIN — VITAMIN D, TAB 1000IU (100/BT) 2000 UNITS: 25 TAB at 09:18

## 2017-12-11 RX ADMIN — DIVALPROEX SODIUM 500 MG: 250 TABLET, FILM COATED, EXTENDED RELEASE ORAL at 09:20

## 2017-12-11 RX ADMIN — LORAZEPAM 0.5 MG: 0.5 TABLET ORAL at 18:36

## 2017-12-11 RX ADMIN — Medication 325 MG: at 09:19

## 2017-12-11 RX ADMIN — Medication 325 MG: at 17:31

## 2017-12-11 RX ADMIN — PRAVASTATIN SODIUM 40 MG: 40 TABLET ORAL at 17:30

## 2017-12-11 RX ADMIN — IPRATROPIUM BROMIDE AND ALBUTEROL SULFATE 3 ML: .5; 3 SOLUTION RESPIRATORY (INHALATION) at 13:46

## 2017-12-11 RX ADMIN — HEPARIN SODIUM 5000 UNITS: 5000 INJECTION, SOLUTION INTRAVENOUS; SUBCUTANEOUS at 22:26

## 2017-12-11 RX ADMIN — CARBAMAZEPINE 100 MG: 100 TABLET, CHEWABLE ORAL at 01:21

## 2017-12-11 RX ADMIN — Medication 1 TABLET: at 17:31

## 2017-12-11 RX ADMIN — BUPROPION HYDROCHLORIDE 150 MG: 150 TABLET, FILM COATED, EXTENDED RELEASE ORAL at 09:18

## 2017-12-11 RX ADMIN — CARBAMAZEPINE 100 MG: 100 TABLET, CHEWABLE ORAL at 09:18

## 2017-12-11 RX ADMIN — BUDESONIDE AND FORMOTEROL FUMARATE DIHYDRATE 2 PUFF: 160; 4.5 AEROSOL RESPIRATORY (INHALATION) at 09:23

## 2017-12-11 RX ADMIN — IPRATROPIUM BROMIDE AND ALBUTEROL SULFATE 3 ML: .5; 3 SOLUTION RESPIRATORY (INHALATION) at 07:39

## 2017-12-11 RX ADMIN — AMLODIPINE BESYLATE 10 MG: 10 TABLET ORAL at 09:18

## 2017-12-11 RX ADMIN — LORAZEPAM 0.5 MG: 0.5 TABLET ORAL at 14:17

## 2017-12-11 RX ADMIN — LEVOTHYROXINE SODIUM 88 MCG: 88 TABLET ORAL at 09:16

## 2017-12-11 RX ADMIN — BUDESONIDE AND FORMOTEROL FUMARATE DIHYDRATE 2 PUFF: 160; 4.5 AEROSOL RESPIRATORY (INHALATION) at 17:31

## 2017-12-11 RX ADMIN — IPRATROPIUM BROMIDE AND ALBUTEROL SULFATE 3 ML: .5; 3 SOLUTION RESPIRATORY (INHALATION) at 20:10

## 2017-12-11 RX ADMIN — Medication 1 TABLET: at 09:19

## 2017-12-11 RX ADMIN — IPRATROPIUM BROMIDE AND ALBUTEROL SULFATE 3 ML: .5; 3 SOLUTION RESPIRATORY (INHALATION) at 01:46

## 2017-12-11 RX ADMIN — CLONIDINE HYDROCHLORIDE 0.2 MG: 0.1 TABLET ORAL at 22:33

## 2017-12-11 RX ADMIN — HEPARIN SODIUM 5000 UNITS: 5000 INJECTION, SOLUTION INTRAVENOUS; SUBCUTANEOUS at 01:17

## 2017-12-11 RX ADMIN — HEPARIN SODIUM 5000 UNITS: 5000 INJECTION, SOLUTION INTRAVENOUS; SUBCUTANEOUS at 15:17

## 2017-12-11 RX ADMIN — CEFTRIAXONE 1000 MG: 1 INJECTION, SOLUTION INTRAVENOUS at 22:27

## 2017-12-11 RX ADMIN — SODIUM CHLORIDE 75 ML/HR: 0.45 INJECTION, SOLUTION INTRAVENOUS at 09:15

## 2017-12-11 RX ADMIN — ASPIRIN 81 MG: 81 TABLET, COATED ORAL at 09:19

## 2017-12-11 RX ADMIN — CARBAMAZEPINE 100 MG: 100 TABLET, CHEWABLE ORAL at 22:27

## 2017-12-11 RX ADMIN — CLONIDINE HYDROCHLORIDE 0.2 MG: 0.1 TABLET ORAL at 01:19

## 2017-12-11 RX ADMIN — CARBAMAZEPINE 100 MG: 100 TABLET, CHEWABLE ORAL at 17:31

## 2017-12-11 RX ADMIN — METOPROLOL SUCCINATE 50 MG: 25 TABLET, FILM COATED, EXTENDED RELEASE ORAL at 09:16

## 2017-12-11 NOTE — PROGRESS NOTES
Patient's daughter would like us to know that she changes the baca catheter Q4 weeks  The last time it was changed was tonight in the ED  I will call the ED nurse who placed it is not charted     Juan Jerez RN  12/11/17  1:25 AM

## 2017-12-11 NOTE — CASE MANAGEMENT
Initial Clinical Review    Admission: Date/Time/Statement: 12/10/17 @ 2214 Inpatient Written     Orders Placed This Encounter   Procedures    Inpatient Admission (expected length of stay for this patient is greater than two midnights)     Standing Status:   Standing     Number of Occurrences:   1     Order Specific Question:   Admitting Physician     Answer:   Jet Otto [[id number]]     Order Specific Question:   Level of Care     Answer:   Med Surg [[id number]]     Order Specific Question:   Bed request comments     Answer:   tele     Order Specific Question:   Estimated length of stay     Answer:   More than 2 Midnights     Order Specific Question:   Certification     Answer:   I certify that inpatient services are medically necessary for this patient for a duration of greater than two midnights  See H&P and MD Progress Notes for additional information about the patient's course of treatment  ED: Date/Time/Mode of Arrival:   ED Arrival Information     Expected Arrival Acuity Means of Arrival Escorted By Service Admission Type    - 12/10/2017 20:20 Urgent Walk-In Family Member General Medicine Urgent    Arrival Complaint    URINARY CATH PROBLEM          Chief Complaint:   Chief Complaint   Patient presents with    Urinary Catheter Problem     Pt with chronic baca reporting of discomfort and urge to urinate       History of Illness: Emily Oliveria is a 71 y o  female who presents with complaints of catheter malfunction  Patient has history of dementia and history is obtained from daughter  Per patient's daughter, her chronic indwelling catheter did stop draining yesterday  She has also noted that she has seemed more lethargic and emotional lately which is how she typically acts when she has a urinary tract infection  Patient low-grade fever  Patient denies chest pain, shortness of breath, abdominal pain  Patient states she does feel bloated like she has to urinate  Patient has no dyspnea on exertion  Patient has a history of CHF but EF is not known  Patient recently treated for bronchitis and since that time has had elevated blood pressure  Patient had a chronic indwelling catheter for the last 7 years secondary to  neurogenic bladder  ED Vital Signs:   ED Triage Vitals   Temperature Pulse Respirations Blood Pressure SpO2   12/10/17 2039 12/10/17 2037 12/10/17 2037 12/10/17 2037 12/10/17 2037   98 4 °F (36 9 °C) 96 22 (!) 232/111 91 %      Temp Source Heart Rate Source Patient Position - Orthostatic VS BP Location FiO2 (%)   12/10/17 2039 12/10/17 2037 12/10/17 2037 12/10/17 2037 --   Oral Monitor Lying Right arm       Pain Score       12/10/17 2300       No Pain        Wt Readings from Last 1 Encounters:   12/10/17 87 1 kg (192 lb 0 3 oz)       Vital Signs (abnormal): /82, 182/80    Abnormal Labs/Diagnostic Test Results:   SODIUM 147*   CHLORIDE 109*   BUN 31*   CREATININE 1 73*     GLUCOSE RANDOM 166*     Component      Latest Ref Rng & Units 12/10/2017 12/11/2017 12/11/2017            1:04 AM  3:20 AM   Troponin I      <=0 04 ng/mL 0 08 (H) 0 11 (H) 0 11 (H)     Leukocytes, UA Small     Nitrite, UA  Positive     Protein, UA 30 (1+)     Blood, UA  Moderate       RBC, UA 4-10     WBC, UA 10-20     Bacteria, UA  Moderate       CXR:  Strand-like opacity at left lung base most consistent with subsegmental atelectasis  CT Chest, Abd, pelvis:  Bladder distention despite presence of Davis catheter  Perivesicular inflammatory change suggesting cystitis  Fat-containing ventral hernia  7 mm left lower lobe pulmonary nodules       CT Head:  WNL  EKG:  NSR nonspecific ST/T wave abnormality    ED Treatment:   Medication Administration from 12/10/2017 2020 to 12/11/2017 0025       Date/Time Order Dose Route Action     12/10/2017 2129 sodium chloride 0 9 % bolus 500 mL 500 mL Intravenous New Bag     12/10/2017 2305 cefTRIAXone (ROCEPHIN) IVPB (premix) 1,000 mg 1,000 mg Intravenous New Bag     12/10/2017 2231 lidocaine (URO-JET) 2 % topical gel   Topical Given     12/10/2017 2227 labetalol (NORMODYNE) injection 10 mg 10 mg Intravenous Given          Past Medical/Surgical History: Active Ambulatory Problems     Diagnosis Date Noted    No Active Ambulatory Problems     Resolved Ambulatory Problems     Diagnosis Date Noted    No Resolved Ambulatory Problems     Past Medical History:   Diagnosis Date    Brain aneurysm     Cardiac disease     [facility]     COPD [facility]chronic obstructive pulmonary disease) [facility]     Dementia     Disease of thyroid gland     Hyperlipidemia     Hypertension     MI (myocardial infarction)     Migraine     Renal disorder     Seizures ([facility])     Stroke ([place] [address] )        Admitting Diagnosis: Acute cystitis [N30 00]  Suprapubic pain [R10 2]  Elevated troponin [R74 8]  Acute on chronic renal insufficiency [N28 9, N18 9]  Chronic indwelling Davis catheter [Z92 89]  Uncontrolled hypertension [I10]  Urinary catheter infection, initial encounter ([place] [address] ) [T83 489R]    Age/Sex: 71 y o  female    Assessment:  Principal Problem:    Cystitis  Active Problems:    HTN (hypertension)    Elevated troponin    COPD (chronic obstructive pulmonary disease) ([facility])    CHF (congestive heart failure) ([facility])    TAM (acute kidney injury) ([place] [address] )        Plan for the Primary Problem(s):  · Cystitis in setting of chronic indwelling Davis catheter  ? Admit  ? Consult Urology  ? Began IV ceftriaxone  ?  New catheter in process of being placed in the ED     Plan for Additional Problems:   · Elevated troponin - trend troponin, telemetry, consult Cardiology, echo pending  · History of CHF - EF not known, Echo pending to determine  · TAM - IV fluids on hold until EF known  · COPD - continue nebulizer treatments as needed  · Hypertension - p r n  IV Lopressor; continue oral Catapres and metoprolol     VTE Prophylaxis: Heparin  / sequential compression device   Code Status:  DNR/DNI  POLST: There is no POLST form on file for this patient (pre-hospital)     Anticipated Length of Stay:  Patient will be admitted on an Inpatient basis with an anticipated length of stay of  more than 2 midnights  Justification for Hospital Stay:  IV antibiotics, Cardiology evaluation    Admission Orders:  Telemetry  Trop q3h  Serial EKGs  Echo  Consult cardiology, urology  Sequential compression device  Blood cxs pending    Scheduled Meds:   amLODIPine 10 mg Oral Daily   aspirin 81 mg Oral Daily   budesonide-formoterol 2 puff Inhalation BID   buPROPion 150 mg Oral Daily   carBAMazepine 100 mg Oral TID   cefTRIAXone 1,000 mg Intravenous Q24H   cholecalciferol 2,000 Units Oral Daily   cloNIDine 0 2 mg Oral HS   divalproex sodium 500 mg Oral Daily   ferrous sulfate 325 mg Oral BID   heparin (porcine) 5,000 Units Subcutaneous Q8H [address]   ipratropium-albuterol 3 mL Nebulization Q6H   levothyroxine 88 mcg Oral Early Morning   LORazepam 0 5 mg Oral BID   metoprolol succinate 50 mg Oral QAM   pravastatin 40 mg Oral Daily With Dinner   senna-docusate sodium 1 tablet Oral BID     Continuous Infusions:    PRN Meds:   acetaminophen    albuterol    metoprolol    ondansetron    St  [facility] in the [facility] [address] for 2017  Network Utilization Review Department  Phone: [phone]; Fax [phone]  ATTENTION: The Network Utilization Review Department is now centralized for our 7 Facilities  Make a note that we have a new phone and fax numbers for our Department  Please call with any questions or concerns to [phone] and carefully follow the prompts so that you are directed to the right person  All voicemails are confidential  Fax any determinations, approvals, denials, and requests for initial or continue stay review clinical to [phone]   Due to HIGH CALL volume, it would be easier if you could please send faxed requests to expedite your requests and in part, help us provide discharge notifications faster

## 2017-12-11 NOTE — SOCIAL WORK
CM met with pt at bedside  She resides at [facility] in a 2nd floor apt with 13 steps w/railing to reach  She is able to navigate steps with no issues  She uses a walker prn  She gets assistance with ADL's and caring for her catheter from the staff  She has received PT through [facility], but she was recently released from the services  [facility] uses [employer] and there is no issues with the co-pays  She has an Advanced Directive and her POA is her daughter Haydee [facility]  Her PCP is Dr Giovani Jimenez  She is treated for anxiety with Lorazepam and depression with Buspar  Her daughter is very involved and transports to all appoints  Daughter will transport home on discharge  CM discussed d/c needs and pt is to return to [facility] when medically cleared

## 2017-12-11 NOTE — PHYSICIAN ADVISOR
Current patient class: Inpatient  The patient is currently on Hospital Day: 2      The patient was admitted to the hospital at 2214 on 12/10/17 for the following diagnosis:  Acute cystitis [N30 00]  Suprapubic pain [R10 2]  Elevated troponin [R74 8]  Acute on chronic renal insufficiency [N28 9, N18 9]  Chronic indwelling Davis catheter [Z92 89]  Uncontrolled hypertension [I10]  [facility]inary catheter infection, initial encounter ([facility] ) [T83 518A]       There is documentation in the medical record of an expected length of stay of at least 2 midnights  The patient is therefore expected to satisfy the 2 midnight benchmark and given the 2 midnight presumption is appropriate for INPATIENT ADMISSION  Given this expectation of a satisfying stay, CMS instructs us that the patient is most often appropriate for inpatient admission under part A provided medical necessity is documented in the chart  After review of the relevant documentation, labs, vital signs and test results, the patient is appropriate for INPATIENT ADMISSION  Admission to the hospital as an inpatient is a complex decision making process which requires the practitioner to consider the patients presenting complaint, history and physical examination and all relevant testing  With this in mind, in this case, the patient was deemed appropriate for INPATIENT ADMISSION  After review of the documentation and testing available at the time of the admission I concur with this clinical determination of medical necessity  Rationale is as follows: The patient is a 67-year-old female with a chronic indwelling urinary catheter because of neurogenic bladder with catheter malfunction, low-grade fever, and mental status change classic for urinary tract infection  This diagnosis was complicated by acute kidney injury and elevated troponin  At this point she is felt to have a type 2 NSTEMI from urinary tract infection with sepsis or from renal impairment    She has been evaluated by Cardiology  Echocardiogram will be ordered to further assess wall motion abnormalities  The troponin was 0 11  She also had initial severe blood pressure elevation, 232/111  This persisted for several hours  Following a dose of intravenous labetalol, her systolic blood pressure was 99  The patient remains on intravenous antibiotics and intravenous fluids  The patient is expected to remain hospitalized for more than two midnights for the treatment of acute cystitis/urinary tract infection complicated by neurogenic bladder and chronic catheter  The other conditions listed above will require ongoing medical management      The patients vitals on arrival were ED Triage Vitals   Temperature Pulse Respirations Blood Pressure SpO2   12/10/17 2039 12/10/17 2037 12/10/17 2037 12/10/17 2037 12/10/17 2037   98 4 °F (36 9 °C) 96 22 (!) 232/111 91 %      Temp Source Heart Rate Source Patient Position - Orthostatic VS BP Location FiO2 (%)   12/10/17 2039 12/10/17 2037 12/10/17 2037 12/10/17 2037 --   Oral Monitor Lying Right arm       Pain Score       12/10/17 2300       No Pain           Past Medical History:   Diagnosis Date    Brain aneurysm     Cardiac disease     CHF (congestive heart failure) (HCC)     COPD (chronic obstructive pulmonary disease) (HCC)     Dementia     Disease of thyroid gland     Hyperlipidemia     Hypertension     MI (myocardial infarction)     Migraine     Renal disorder     Seizures ([address] )     Stroke ([facility] )      Past Surgical History:   Procedure Laterality Date    APPENDECTOMY      BLADDER TUMOR EXCISION      BRAIN SURGERY      CARDIAC SURGERY      CORONARY STENT PLACEMENT      5 stents    EYE SURGERY      MANDIBLE FRACTURE SURGERY      TONSILLECTOMY      TUBAL LIGATION      UTERINE FIBROID SURGERY             Consults have been placed to:   IP CONSULT TO CARDIOLOGY  IP CONSULT TO UROLOGY    Vitals:    12/11/17 0147 12/11/17 0300 12/11/17 0700 12/11/17 0739   BP:  99/50 120/60    Pulse:  65 64    Resp:  18 18    Temp:  98 4 °F (36 9 °C) 98 2 °F (36 8 °C)    TempSrc:  Axillary Axillary    SpO2: 94% 90% 90% 90%   Weight:       Height:           Most recent labs:    Recent Labs      12/10/17   2123   12/11/17   0320  12/11/17   0326   WBC  8 30   --    --   6 53   HGB  12 9   --    --   11 7   HCT  39 6   --    --   35 7   PLT  153   < >   --   134*   K  3 8   --    --   3 7   NA  147*   --    --   147*   CALCIUM  9 8   --    --   9 1   BUN  31*   --    --   29*   CREATININE  1 73*   --    --   1 42*   LIPASE  232   --    --    --    INR  1 05   --    --    --    TROPONINI  0 08*   < >  0 11*   --    AST  14   --    --    --    ALT  18   --    --    --    ALKPHOS  55   --    --    --    BILITOT  0 20   --    --    --     < > = values in this interval not displayed         Scheduled Meds:  amLODIPine 10 mg Oral Daily   aspirin 81 mg Oral Daily   budesonide-formoterol 2 puff Inhalation BID   buPROPion 150 mg Oral Daily   carBAMazepine 100 mg Oral TID   cefTRIAXone 1,000 mg Intravenous Q24H   cholecalciferol 2,000 Units Oral Daily   cloNIDine 0 2 mg Oral HS   divalproex sodium 500 mg Oral Daily   ferrous sulfate 325 mg Oral BID   heparin (porcine) 5,000 Units Subcutaneous Q8H [address]   ipratropium-albuterol 3 mL Nebulization Q6H   levothyroxine 88 mcg Oral Early Morning   LORazepam 0 5 mg Oral BID   metoprolol succinate 50 mg Oral QAM   pravastatin 40 mg Oral Daily With Dinner   senna-docusate sodium 1 tablet Oral BID     Continuous Infusions:  sodium chloride 75 mL/hr Last Rate: 75 mL/hr (12/11/17 0915)     PRN Meds:   acetaminophen    albuterol    metoprolol    ondansetron    Surgical procedures (if appropriate):

## 2017-12-11 NOTE — PROGRESS NOTES
[facility] Progress Note  Patient: Eduardo Brito 71 y o  female   MRN: [id number]  PCP: Arnoldo East DO  Unit/Bed#: -[place] Encounter: [id number]  Date Of Visit: 12/11/17    Assessment:    Principal Problem:    Cystitis  Active Problems:    HTN (hypertension)    Elevated troponin    COPD (chronic obstructive pulmonary disease) (HCC)    CHF (congestive heart failure) (HCC)    TAM (acute kidney injury) ([facility] )      Plan:    · 1  Acute cystitis in a patient with chronic urinary catheter secondary to neurogenic bladder- will continue IV abx  Urology following  No surgical intervention needed at this time  · 2  TAM- on IVF  Cr  Improving  · 3  NSTEMI type 2 secondary to acute cystitis- troponin stable  Will f/u 2d echo  Cardiology following  · 4  HTN- on norvasc, clonidine, metoprolol  · 5  Hypothyroidism- on levothyroxine  · 6  H/o CAD s/p 5 stents- stable  · 7  Hypernatremia- possibly secondary to dehydration- on 1/2NS  Will continue to monitor  · 8  H/o seizure- on tegretol and depakote  · 9  HTN urgency- treated with IV lopressor  Improved      VTE Pharmacologic Prophylaxis:   Pharmacologic: Heparin  Mechanical VTE Prophylaxis in Place: Yes    Patient Centered Rounds: I have performed bedside rounds with nursing staff today  Discussions with Specialists or Other Care Team Provider:     Education and Discussions with Family / Patient:     Time Spent for Care: 20 minutes  More than 50% of total time spent on counseling and coordination of care as described above  Current Length of Stay: 1 day(s)    Current Patient Status: Inpatient   Certification Statement: The patient will continue to require additional inpatient hospital stay due to TAM, UTI    Discharge Plan / Estimated Discharge Date: Once UTI improves    Code Status: Level 3 - DNAR and DNI      Subjective:   Patient seen and examined at bedside  Patient has no new complaints      Objective:     Vitals:   Temp (24hrs), Av 2 °F (36 8 °C), Min:97 8 °F (36 6 °C), Max:98 4 °F (36 9 °C)    HR:  [64-96] 64  Resp:  [18-22] 18  BP: ()/() 120/60  SpO2:  [90 %-94 %] 90 %  Body mass index is 37 5 kg/m²  Input and Output Summary (last 24 hours): Intake/Output Summary (Last 24 hours) at 17 1022  Last data filed at 17 0500   Gross per 24 hour   Intake                0 ml   Output              500 ml   Net             -500 ml       Physical Exam:     Physical Exam   Constitutional: She is oriented to person, place, and time  She appears well-developed and well-nourished  HENT:   Head: Normocephalic and atraumatic  Eyes: Conjunctivae and EOM are normal  Pupils are equal, round, and reactive to light  Neck: Normal range of motion  Neck supple  No JVD present  No tracheal deviation present  No thyromegaly present  Cardiovascular: Normal rate, regular rhythm and normal heart sounds  Exam reveals no gallop and no friction rub  No murmur heard  Pulmonary/Chest: Effort normal and breath sounds normal  No respiratory distress  She has no wheezes  She has no rales  Abdominal: Soft  Bowel sounds are normal  She exhibits no distension  There is no tenderness  There is no rebound  Genitourinary:   Genitourinary Comments: Chronic urinary catheter  Musculoskeletal: Normal range of motion  She exhibits no edema  Neurological: She is alert and oriented to person, place, and time  Vitals reviewed  Additional Data:     Labs:      Results from last 7 days  Lab Units 17  0326  12/10/17  2123   WBC Thousand/uL 6 53  --  8 30   HEMOGLOBIN g/dL 11 7  --  12 9   HEMATOCRIT % 35 7  --  39 6   PLATELETS Thousands/uL 134*  < > 153   NEUTROS PCT %  --   --  64   LYMPHS PCT %  --   --  22   MONOS PCT %  --   --  10   EOS PCT %  --   --  4   < > = values in this interval not displayed      Results from last 7 days  Lab Units 17  0326 12/10/17  2123   SODIUM mmol/L 147* 147*   POTASSIUM mmol/L 3 7 3 8   CHLORIDE mmol/L 111* 109*   CO2 mmol/L 26 27   BUN mg/dL 29* 31*   CREATININE mg/dL 1 42* 1 73*   CALCIUM mg/dL 9 1 9 8   TOTAL PROTEIN g/dL  --  7 6   BILIRUBIN TOTAL mg/dL  --  0 20   ALK PHOS U/L  --  55   ALT U/L  --  18   AST U/L  --  14   GLUCOSE RANDOM mg/dL 112 166*       Results from last 7 days  Lab Units 12/10/17  2123   INR  1 05       * I Have Reviewed All Lab Data Listed Above  * Additional Pertinent Lab Tests Reviewed: [address] Admission Reviewed    Imaging:    Imaging Reports Reviewed Today Include:   Imaging Personally Reviewed by Myself Includes:      Recent Cultures (last 7 days):           Last 24 Hours Medication List:     amLODIPine 10 mg Oral Daily   aspirin 81 mg Oral Daily   budesonide-formoterol 2 puff Inhalation BID   buPROPion 150 mg Oral Daily   carBAMazepine 100 mg Oral TID   cefTRIAXone 1,000 mg Intravenous Q24H   cholecalciferol 2,000 Units Oral Daily   cloNIDine 0 2 mg Oral HS   divalproex sodium 500 mg Oral Daily   ferrous sulfate 325 mg Oral BID   heparin (porcine) 5,000 Units Subcutaneous Q8H [address]   ipratropium-albuterol 3 mL Nebulization Q6H   levothyroxine 88 mcg Oral Early Morning   LORazepam 0 5 mg Oral BID   metoprolol succinate 50 mg Oral QAM   pravastatin 40 mg Oral Daily With Dinner   senna-docusate sodium 1 tablet Oral BID        Today, Patient Was Seen By: Mandy Gurrola MD    ** Please Note: This note has been constructed using a voice recognition system   **

## 2017-12-11 NOTE — PLAN OF CARE
Problem: DISCHARGE PLANNING - CARE MANAGEMENT  Goal: Discharge to post-acute care or home with appropriate resources  INTERVENTIONS:  - Conduct assessment to determine patient/family and health care team treatment goals, and need for post-acute services based on payer coverage, community resources, and patient preferences, and barriers to discharge  - Address psychosocial, clinical, and financial barriers to discharge as identified in assessment in conjunction with the patient/family and health care team  - Arrange appropriate level of post-acute services according to patient's   needs and preference and payer coverage in collaboration with the physician and health care team  - Communicate with and update the patient/family, physician, and health care team regarding progress on the discharge plan  - Arrange appropriate transportation to post-acute venues  Outcome: Progressing  CM met with pt at bedside  She resides at [facility] in a 2nd floor apt with 13 steps w/railing to reach  She is able to navigate steps with no issues  She uses a walker prn  She gets assistance with ADL's and caring for her catheter from the staff  She has received PT through [facility], but she was recently released from the services  [facility] uses [employer] and there is no issues with the co-pays  She has an Advanced Directive and her POA is her daughter Jailyn Ayala  Her PCP is Dr Mora Noonan  She is treated for anxiety with Lorazepam and depression with Buspar  Her daughter is very involved and transports to all appoints  Daughter will transport home on discharge  CM discussed d/c needs and pt is to return to [facility] when medically cleared

## 2017-12-11 NOTE — H&P
History and Physical - [facility]    Patient Information: Amita Pierce 71 y o  female MRN: [id number]  Unit/Bed#: ED 26 Encounter: [id number]  Admitting Physician: Liam Hoff PA-C  PCP: Rosendo Norton DO  Date of Admission:  12/10/17    Assessment/Plan:    Hospital Problem List:     Principal Problem:    Cystitis  Active Problems:    HTN (hypertension)    Elevated troponin    COPD (chronic obstructive pulmonary disease) ([facility])    CHF (congestive heart failure) ([place] [id number] )    TAM (acute kidney injury) ([place] [address] )      Plan for the Primary Problem(s):  · Cystitis in setting of chronic indwelling Davis catheter  · Admit  · Consult Urology  · Began IV ceftriaxone  · New catheter in process of being placed in the ED    Plan for Additional Problems:   · Elevated troponin - trend troponin, telemetry, consult Cardiology, echo pending  · History of CHF - EF not known, Echo pending to determine  · TAM - IV fluids on hold until EF known  · COPD - continue nebulizer treatments as needed  · Hypertension - p r n  IV Lopressor; continue oral Catapres and metoprolol    VTE Prophylaxis: Heparin  / sequential compression device   Code Status:  DNR/DNI  POLST: There is no POLST form on file for this patient (pre-hospital)    Anticipated Length of Stay:  Patient will be admitted on an Inpatient basis with an anticipated length of stay of  more than 2 midnights  Justification for Hospital Stay:  IV antibiotics, Cardiology evaluation    Total Time for Visit, including Counseling / Coordination of Care: 30 minutes  Greater than 50% of this total time spent on direct patient counseling and coordination of care  Chief Complaint:   Catheter malfunction    History of Present Illness: Amita Pierce is a 71 y o  female who presents with complaints of catheter malfunction  Patient has history of dementia and history is obtained from daughter    Per patient's daughter, her chronic indwelling catheter did stop draining yesterday  She has also noted that she has seemed more lethargic and emotional lately which is how she typically acts when she has a urinary tract infection  Patient low-grade fever  Patient denies chest pain, shortness of breath, abdominal pain  Patient states she does feel bloated like she has to urinate  Patient has no dyspnea on exertion  Patient has a history of CHF but EF is not known  Patient recently treated for bronchitis and since that time has had elevated blood pressure  Patient had a chronic indwelling catheter for the last 7 years secondary to  neurogenic bladder  Review of Systems:    Review of Systems   Genitourinary: Positive for dysuria  All other systems reviewed and are negative  Past Medical and Surgical History:     Past Medical History:   Diagnosis Date    Brain aneurysm     Cardiac disease     [facility]     COPD [facility]chronic obstructive pulmonary disease) [facility]     Dementia     Disease of thyroid gland     Hyperlipidemia     Hypertension     MI (myocardial infarction)     Migraine     Renal disorder     Seizures (HCC)     Stroke ([facility] [address] )        Past Surgical History:   Procedure Laterality Date    APPENDECTOMY      BLADDER TUMOR EXCISION      BRAIN SURGERY      CARDIAC SURGERY      CORONARY STENT PLACEMENT      5 stents    EYE SURGERY      MANDIBLE FRACTURE SURGERY      TONSILLECTOMY      TUBAL LIGATION      UTERINE FIBROID SURGERY         Meds/Allergies:    Prior to Admission medications    Medication Sig Start Date End Date Taking?  Authorizing Provider   acetaminophen (TYLENOL) 500 mg tablet Take 500 mg by mouth every 6 (six) hours as needed for mild pain   Yes Historical Provider, MD   albuterol (2 5 mg/3 mL) 0 083 % nebulizer solution Take 2 5 mg by nebulization every 6 (six) hours as needed for wheezing   Yes Historical Provider, MD   albuterol (PROVENTIL HFA,VENTOLIN HFA) 90 mcg/act inhaler Inhale 2 puffs every 6 (six) hours as needed for wheezing   Yes Historical Provider, MD   amLODIPine (NORVASC) 10 mg tablet Take 10 mg by mouth daily   Yes Historical Provider, MD   aspirin (ECOTRIN LOW STRENGTH) 81 mg EC tablet Take 81 mg by mouth daily   Yes Historical Provider, MD   budesonide-formoterol (SYMBICORT) 160-4 5 mcg/act inhaler Inhale 2 puffs 2 (two) times a day   Yes Historical Provider, MD   buPROPion (WELLBUTRIN SR) 150 mg 12 hr tablet Take 150 mg by mouth daily   Yes Historical Provider, MD   carBAMazepine (TEGretol) 100 mg chewable tablet Chew 100 mg 3 (three) times a day   Yes Historical Provider, MD   Cholecalciferol (VITAMIN D) 2000 units CAPS Take by mouth   Yes Historical Provider, MD   cloNIDine (CATAPRES) 0 1 mg tablet Take 0 2 mg by mouth daily at bedtime   Yes Historical Provider, MD   divalproex sodium (DEPAKOTE ER) 250 mg 24 hr tablet Take 500 mg by mouth daily   Yes Historical Provider, MD   fenofibrate micronized (LOFIBRA) 67 MG capsule Take 67 mg by mouth every morning before breakfast   Yes Historical Provider, MD   ferrous sulfate 325 (65 Fe) mg tablet Take 325 mg by mouth 2 (two) times a day   Yes Historical Provider, MD   ipratropium-albuterol (DUO-NEB) 0 5-2 5 mg/mL Take 3 mL by nebulization every 6 (six) hours   Yes Historical Provider, MD   Levothyroxine Sodium 88 MCG CAPS Take by mouth every morning   Yes Historical Provider, MD   LORazepam (ATIVAN) 0 5 mg tablet Take 0 5 mg by mouth 2 (two) times a day   Yes Historical Provider, MD   metoprolol tartrate (LOPRESSOR) 50 mg tablet Take 50 mg by mouth every morning   Yes Historical Provider, MD   Sennosides-Docusate Sodium (SENNA PLUS PO) Take by mouth daily as needed   Yes Historical Provider, MD   simvastatin (ZOCOR) 20 mg tablet Take 20 mg by mouth daily at bedtime   Yes Historical Provider, MD   albuterol (PROVENTIL HFA,VENTOLIN HFA) 90 mcg/act inhaler Inhale 2 puffs every 6 (six) hours as needed for wheezing  12/10/17 Yes Historical Provider, MD     I have reviewed home medications with patient family member  Allergies: Allergies   Allergen Reactions    Ramipril Anaphylaxis    Penicillins        Social History:     Marital Status:    Occupation:  Retired  Patient Pre-hospital Living Situation:  Resides at [address] [place] [address]  Patient Pre-hospital Level of Mobility:  Ambulatory with walker  Patient Pre-hospital Diet Restrictions:  None  Substance Use History:   History   Alcohol Use No     History   Smoking Status    Former Smoker   Smokeless Tobacco    Never Used     History   Drug Use No       Family History:    History reviewed  No pertinent family history  Physical Exam:     Vitals:   Blood Pressure: (!) 199/82 (12/10/17 2225)  Pulse: 86 (12/10/17 2225)  Temperature: 98 4 °F (36 9 °C) (12/10/17 2039)  Temp Source: Oral (12/10/17 2039)  Respirations: 22 (12/10/17 2225)  Height: 5' (152 4 cm) (12/10/17 2037)  Weight - Scale: 87 1 kg (192 lb 0 3 oz) (12/10/17 2037)  SpO2: 92 % (12/10/17 2225)    Physical Exam   Constitutional: She appears well-developed and well-nourished  HENT:   Head: Normocephalic and atraumatic  Right Ear: External ear normal    Left Ear: External ear normal    Nose: Nose normal    Mouth/Throat: Oropharynx is clear and moist    Eyes: Conjunctivae and EOM are normal  Pupils are equal, round, and reactive to light  Cardiovascular: Normal rate, regular rhythm and normal heart sounds  Pulmonary/Chest: Effort normal and breath sounds normal    Abdominal: Soft  Bowel sounds are normal  There is tenderness  Neurological: She is alert  Skin: Skin is warm and dry  Psychiatric: She has a normal mood and affect  Her behavior is normal  Judgment and thought content normal    Vitals reviewed  Additional Data:     Lab Results: I have personally reviewed pertinent reports          Results from last 7 days  Lab Units 12/10/17  2123   WBC Thousand/uL 8 30   HEMOGLOBIN g/dL 12 9   HEMATOCRIT % 39 6   PLATELETS Thousands/uL 153   NEUTROS PCT % 64   LYMPHS PCT % 22   MONOS PCT % 10   EOS PCT % 4       Results from last 7 days  Lab Units 12/10/17  2123   SODIUM mmol/L 147*   POTASSIUM mmol/L 3 8   CHLORIDE mmol/L 109*   CO2 mmol/L 27   BUN mg/dL 31*   CREATININE mg/dL 1 73*   CALCIUM mg/dL 9 8   TOTAL PROTEIN g/dL 7 6   BILIRUBIN TOTAL mg/dL 0 20   ALK PHOS U/L 55   ALT U/L 18   AST U/L 14   GLUCOSE RANDOM mg/dL 166*       Results from last 7 days  Lab Units 12/10/17  2123   INR  1 05       Imaging: I have personally reviewed pertinent reports  Ct Chest Abdomen Pelvis Wo Contrast    Result Date: 12/10/2017  Narrative: CT CHEST, ABDOMEN AND PELVIS WITHOUT IV CONTRAST INDICATION:  Weakness  COMPARISON: None  TECHNIQUE: CT examination of the chest, abdomen and pelvis was performed without intravenous contrast   Reformatted images were created in axial, sagittal, and coronal planes  Radiation dose length product (DLP) for this visit:  654 mGy-cm   This examination, like all CT scans performed in the [facility], was performed utilizing techniques to minimize radiation dose exposure, including the use of iterative reconstruction and automated exposure control  Enteric contrast was administered  FINDINGS: CHEST LUNGS:  7 mm left lower lobe pulmonary nodule is noted series 3 image 46   7 mm left lower lobe pulmonary nodule is identified best seen series 602 image 101  PLEURA:  Unremarkable  HEART/GREAT VESSELS:  Extensive coronary artery calcification is present  MEDIASTINUM AND ANNE:  Unremarkable  CHEST WALL AND LOWER NECK:       Normal  ABDOMEN LIVER/BILIARY TREE:  Unremarkable  GALLBLADDER:  Cholelithiasis is present  SPLEEN:  Unremarkable  PANCREAS:  Unremarkable  ADRENAL GLANDS:  Unremarkable  KIDNEYS/URETERS:  Right kidney is absent  Left kidney is unremarkable  STOMACH AND BOWEL:  Unremarkable  APPENDIX:  No findings to suggest appendicitis   ABDOMINOPELVIC CAVITY:  No ascites or free intraperitoneal air  No lymphadenopathy  VESSELS:  Unremarkable for patient's age  PELVIS REPRODUCTIVE ORGANS:  Davis catheter is identified  Bladder remains distended  Periventricular inflammatory change suggests cystitis  URINARY BLADDER:  Unremarkable  ABDOMINAL WALL/INGUINAL REGIONS:  Fat-containing periumbilical hernia is identified  OSSEOUS STRUCTURES:  No acute fracture or destructive osseous lesion  Impression: Bladder distention despite presence of Davis catheter  Perivesicular inflammatory change suggesting cystitis  Fat-containing ventral hernia  7 mm left lower lobe pulmonary nodules  Based on current Fleischner Society 2017 Guidelines on incidental pulmonary nodule, followup non-contrast CT is recommended, initially at 3-6 months from this examination and, if stable at that time, an additional followup is recommended for 18-24 months from this exam     Workstation performed: [id number]     Xr Chest Pa & Lateral    Result Date: 11/21/2017  Narrative: CHEST - DUAL ENERGY INDICATION: 59-year-old female, cough, chest pain COMPARISON: None VIEWS:  PA (including soft tissue/bone algorithms) and lateral projections; 4 images FINDINGS: There is somewhat strand-like opacity at lateral left lung base which most likely represents subsegmental atelectasis  The lungs are otherwise clear  No pleural effusions  The cardiomediastinal silhouette is unremarkable  Bony thorax is unremarkable  Impression: Strand-like opacity at left lung base most consistent with subsegmental atelectasis Findings personally discussed by phone with Dr Carlos Enrique Kamara at 4:50 PM, 11/21/2017 Workstation performed: [id number]     Ct Head Without Contrast    Result Date: 12/10/2017  Narrative: CT BRAIN - WITHOUT CONTRAST INDICATION:  Lethargy  Altered mental status  COMPARISON:  None  TECHNIQUE:  CT examination of the brain was performed  In addition to axial images, coronal reformatted images were created and submitted for interpretation  Radiation dose length product (DLP) for this visit:  969 mGy-cm   This examination, like all CT scans performed in the [facility], was performed utilizing techniques to minimize radiation dose exposure, including the use of iterative reconstruction and automated exposure control  IMAGE QUALITY:  Diagnostic  FINDINGS:  PARENCHYMA:  Decreased attenuation is noted in the supratentorial white matter demonstrating an appearance most consistent with moderate microangiopathic change  No intracranial mass, mass effect or midline shift  No CT signs of acute infarction  There is no parenchymal hemorrhage  Right frontal encephalomalacia is noted  Patient is status post remote aneurysm clipping  VENTRICLES AND EXTRA-AXIAL SPACES:  Enlargement of ventricles and extra-axial CSF spaces consistent with cerebral and cerebellar atrophy  VISUALIZED ORBITS AND PARANASAL SINUSES:  Unremarkable  CALVARIUM AND EXTRACRANIAL SOFT TISSUES:  Patient is status post right frontal craniotomy  Impression: No acute intracranial abnormality  Workstation performed: [id number]       EKG, Pathology, and Other Studies Reviewed on Admission:   · EKG:  Normal sinus rhythm, nonspecific ST/T wave abnormality    Allscripts / Epic Records Reviewed: Yes     ** Please Note: This note has been constructed using a voice recognition system   **

## 2017-12-11 NOTE — CONSULTS
Consultation - Cardiology    Luna Martins 71 y o  female MRN: [id number]  Unit/Bed#: -01 Encounter: [id number]    Physician Requesting Consult: Donald Leggett MD    Reason for Consult / Principal Problem: +trop    HPI: Luna Martins is a 71y o  year old female with a past medical history significant for dementia, brain aneurysm, CAD with stenting, CHF, COPD, hypothyroidism, hyperlipidemia, hypertension, migraines, seizure disorder, stroke, CKD  Patient is alone at the time of my visit and has baseline dementia so history is obtained from the H&P  Per the H&P patient was brought in on 12/10/2017 in the evening with complaints of catheter malfunction  Patient has a known indwelling chronic catheter because of neurogenic bladder  The daughter had noted that had stopped draining  She also noted that the mother was more lethargic and more emotional which occurs when the patient has UTI  She is also noted to have low-grade fever  At the time of admission she had no chest pain, chest pressure, shortness of breath, abdominal pain  She did note that she felt bloated  Currently the patient states that she feels short of breath but appears quite comfortable she also notes shortness of breath when she takes a deep breath  H&P note she was recently treated for bronchitis       Historical Information   Past Medical History:   Diagnosis Date    Brain aneurysm     Cardiac disease     CHF (congestive heart failure) (HCC)     COPD (chronic obstructive pulmonary disease) (HCC)     Dementia     Disease of thyroid gland     Hyperlipidemia     Hypertension     MI (myocardial infarction)     Migraine     Renal disorder     Seizures (HCC)     Stroke [facility])      Past Surgical History:   Procedure Laterality Date    APPENDECTOMY      BLADDER TUMOR EXCISION      BRAIN SURGERY      CARDIAC SURGERY      CORONARY STENT PLACEMENT      5 stents    EYE SURGERY      MANDIBLE FRACTURE SURGERY      TONSILLECTOMY  TUBAL LIGATION      UTERINE FIBROID SURGERY       History   Alcohol Use No     History   Drug Use No     History   Smoking Status    Former Smoker   Smokeless Tobacco    Never Used       FAMILY HISTORY:  History reviewed  No pertinent family history      MEDS & ALLERGIES:  all current active meds have been reviewed and current meds:   Current Facility-Administered Medications   Medication Dose Route Frequency    acetaminophen (TYLENOL) tablet 650 mg  650 mg Oral Q6H PRN    albuterol inhalation solution 2 5 mg  2 5 mg Nebulization Q6H PRN    amLODIPine (NORVASC) tablet 10 mg  10 mg Oral Daily    aspirin (ECOTRIN LOW STRENGTH) EC tablet 81 mg  81 mg Oral Daily    budesonide-formoterol (SYMBICORT) 160-4 5 mcg/act inhaler 2 puff  2 puff Inhalation BID    buPROPion (WELLBUTRIN XL) 24 hr tablet 150 mg  150 mg Oral Daily    carBAMazepine (TEGretol) chewable tablet 100 mg  100 mg Oral TID    cefTRIAXone (ROCEPHIN) IVPB (premix) 1,000 mg  1,000 mg Intravenous Q24H    cholecalciferol (VITAMIN D3) tablet 2,000 Units  2,000 Units Oral Daily    cloNIDine (CATAPRES) tablet 0 2 mg  0 2 mg Oral HS    divalproex sodium (DEPAKOTE ER) 24 hr tablet 500 mg  500 mg Oral Daily    ferrous sulfate tablet 325 mg  325 mg Oral BID    heparin (porcine) subcutaneous injection 5,000 Units  5,000 Units Subcutaneous Q8H [facility]    ipratropium-albuterol (DUO-NEB) 0 5-2 5 mg/mL inhalation solution 3 mL  3 mL Nebulization Q6H    levothyroxine tablet 88 mcg  88 mcg Oral Early Morning    LORazepam (ATIVAN) tablet 0 5 mg  0 5 mg Oral BID    metoprolol (LOPRESSOR) injection 5 mg  5 mg Intravenous Q6H PRN    metoprolol succinate (TOPROL-XL) 24 hr tablet 50 mg  50 mg Oral QAM    ondansetron (ZOFRAN) injection 4 mg  4 mg Intravenous Q6H PRN    pravastatin (PRAVACHOL) tablet 40 mg  40 mg Oral Daily With Dinner    senna-docusate sodium (SENOKOT S) 8 6-50 mg per tablet 1 tablet  1 tablet Oral BID    sodium chloride infusion 0 45 %  75 mL/hr Intravenous Continuous       sodium chloride 75 mL/hr     Allergies   Allergen Reactions    Ramipril Anaphylaxis    Penicillins          REVIEW OF SYSTEMS:  Unable to assess given history of dementia    PHYSICAL EXAM:  Vitals:   Vitals:    12/11/17 0739   BP:    Pulse:    Resp:    Temp:    SpO2: 90%     Body mass index is 37 5 kg/m²  Systolic (81NKM), TYB:894 , Min:99 , MSV:731     Diastolic (87LYC), WQF:72, Min:50, Max:111      Intake/Output Summary (Last 24 hours) at 12/11/17 0851  Last data filed at 12/11/17 0500   Gross per 24 hour   Intake                0 ml   Output              500 ml   Net             -500 ml     Weight (last 2 days)     Date/Time   Weight    12/10/17 2300  87 1 (192 02)    12/10/17 2037  87 1 (192 02)            Invasive Devices     Peripheral Intravenous Line            Peripheral IV 12/10/17 Right Arm less than 1 day          Drain            Urethral Catheter Latex 16 Fr  less than 1 day                General: Patient is not in acute distress  Laying comfortably in the bed  Awake, alert, patient has baseline dementia  Head: Normocephalic  Atraumatic  HEENT: Both pupils normal sized, round and reactive to light  Nonicteric  Neck: JVP not elevated  Trachea central  No thyromegaly  Respiratory:  Decreased breath sounds  Cardiovascular: RRR  S1 and S2 normal  No murmur, rub or gallop  GI: Abdomen soft, nontender  No guarding or rigidity  Liver and spleen not palpable  Bowel sounds present  Neurologic: Patient is awake, alert    Patient has baseline dementia Moving all extremities  Extremities: No clubbing, cyanosis or edema  Integument: No skin rashes or ulceration  Lymphatic: No cervical lymphadenopathy      LABORATORY RESULTS:    Results from last 7 days  Lab Units 12/11/17  0320 12/11/17  0104 12/10/17  2123   TROPONIN I ng/mL 0 11* 0 11* 0 08*       CBC with diff:   Results from last 7 days  Lab Units 12/11/17  0326 12/11/17  0104 12/10/17  2123   WBC Thousand/uL 6 53  -- 8  30   HEMOGLOBIN g/dL 11 7  --  12 9   HEMATOCRIT % 35 7  --  39 6   MCV fL 101*  --  102*   PLATELETS Thousands/uL 134* 133* 153   MCH pg 33 2  --  33 1   MCHC g/dL 32 8  --  32 6   RDW % 12 2  --  12 2   MPV fL 11 0 11 2 11 7   NRBC AUTO /100 WBCs  --   --  0       CMP:  Results from last 7 days  Lab Units 12/11/17  0326 12/10/17  2123   SODIUM mmol/L 147* 147*   POTASSIUM mmol/L 3 7 3 8   CHLORIDE mmol/L 111* 109*   CO2 mmol/L 26 27   ANION GAP mmol/L 10 11   BUN mg/dL 29* 31*   CREATININE mg/dL 1 42* 1 73*   GLUCOSE RANDOM mg/dL 112 166*   CALCIUM mg/dL 9 1 9 8   AST U/L  --  14   ALT U/L  --  18   ALK PHOS U/L  --  55   TOTAL PROTEIN g/dL  --  7 6   ALBUMIN g/dL  --  3 6   BILIRUBIN TOTAL mg/dL  --  0 20   EGFR ml/min/1 73sq m 37 29       BMP:  Results from last 7 days  Lab Units 12/11/17  0326 12/10/17  2123   SODIUM mmol/L 147* 147*   POTASSIUM mmol/L 3 7 3 8   CHLORIDE mmol/L 111* 109*   CO2 mmol/L 26 27   BUN mg/dL 29* 31*   CREATININE mg/dL 1 42* 1 73*   GLUCOSE RANDOM mg/dL 112 166*   CALCIUM mg/dL 9 1 9 8         Results from last 7 days  Lab Units 12/10/17  2123   NT-PRO BNP pg/mL 958*        Results from last 7 days  Lab Units 12/10/17  2123   MAGNESIUM mg/dL 2 0               Results from last 7 days  Lab Units 12/10/17  2123   INR  1 05       Imaging: I have personally reviewed pertinent reports  Procedure: Ct Chest Abdomen Pelvis Wo Contrast    Result Date: 12/10/2017  Narrative: CT CHEST, ABDOMEN AND PELVIS WITHOUT IV CONTRAST INDICATION:  Weakness  COMPARISON: None  TECHNIQUE: CT examination of the chest, abdomen and pelvis was performed without intravenous contrast   Reformatted images were created in axial, sagittal, and coronal planes  Radiation dose length product (DLP) for this visit:  654 mGy-cm     This examination, like all CT scans performed in the [facility], was performed utilizing techniques to minimize radiation dose exposure, including the use of iterative reconstruction and automated exposure control  Enteric contrast was administered  FINDINGS: CHEST LUNGS:  7 mm left lower lobe pulmonary nodule is noted series 3 image 46   7 mm left lower lobe pulmonary nodule is identified best seen series 602 image 101  PLEURA:  Unremarkable  HEART/GREAT VESSELS:  Extensive coronary artery calcification is present  MEDIASTINUM AND ANNE:  Unremarkable  CHEST WALL AND LOWER NECK:       Normal  ABDOMEN LIVER/BILIARY TREE:  Unremarkable  GALLBLADDER:  Cholelithiasis is present  SPLEEN:  Unremarkable  PANCREAS:  Unremarkable  ADRENAL GLANDS:  Unremarkable  KIDNEYS/URETERS:  Right kidney is absent  Left kidney is unremarkable  STOMACH AND BOWEL:  Unremarkable  APPENDIX:  No findings to suggest appendicitis  ABDOMINOPELVIC CAVITY:  No ascites or free intraperitoneal air  No lymphadenopathy  VESSELS:  Unremarkable for patient's age  PELVIS REPRODUCTIVE ORGANS:  Davis catheter is identified  Bladder remains distended  Periventricular inflammatory change suggests cystitis  URINARY BLADDER:  Unremarkable  ABDOMINAL WALL/INGUINAL REGIONS:  Fat-containing periumbilical hernia is identified  OSSEOUS STRUCTURES:  No acute fracture or destructive osseous lesion  Impression: Bladder distention despite presence of Davis catheter  Perivesicular inflammatory change suggesting cystitis  Fat-containing ventral hernia  7 mm left lower lobe pulmonary nodules  Based on current Fleischner Society 2017 Guidelines on incidental pulmonary nodule, followup non-contrast CT is recommended, initially at 3-6 months from this examination and, if stable at that time, an additional followup is recommended for 18-24 months from this exam     Workstation performed: [id number]     Procedure: Ct Head Without Contrast    Result Date: 12/10/2017  Narrative: CT BRAIN - WITHOUT CONTRAST INDICATION:  Lethargy  Altered mental status  COMPARISON:  None  TECHNIQUE:  CT examination of the brain was performed  In addition to axial images, coronal reformatted images were created and submitted for interpretation  Radiation dose length product (DLP) for this visit:  969 mGy-cm   This examination, like all CT scans performed in the [facility], was performed utilizing techniques to minimize radiation dose exposure, including the use of iterative reconstruction and automated exposure control  IMAGE QUALITY:  Diagnostic  FINDINGS:  PARENCHYMA:  Decreased attenuation is noted in the supratentorial white matter demonstrating an appearance most consistent with moderate microangiopathic change  No intracranial mass, mass effect or midline shift  No CT signs of acute infarction  There is no parenchymal hemorrhage  Right frontal encephalomalacia is noted  Patient is status post remote aneurysm clipping  VENTRICLES AND EXTRA-AXIAL SPACES:  Enlargement of ventricles and extra-axial CSF spaces consistent with cerebral and cerebellar atrophy  VISUALIZED ORBITS AND PARANASAL SINUSES:  Unremarkable  CALVARIUM AND EXTRACRANIAL SOFT TISSUES:  Patient is status post right frontal craniotomy  Impression: No acute intracranial abnormality  Workstation performed: [id number]       EKG reviewed personally:  Unavailable to me at this time    Telemetry reviewed personally:  Sinus rhythm      Assessment and Plan:  1  NSTEMI type 2 likely secondary to underlying cystitis; troponin 0 08/0 11/0  11  Echocardiogram pending  Continue Norvasc 10, aspirin 81, clonidine 0 2 daily, Toprol 50 daily, pravastatin 40  Given patient's underlying dementia/DNR status WILL NOT do an ischemic workup  Continue with medical management/conservative treatment  Continue telemetry  2   Cystitis--continue antibiotics  Management per [facility]   Urology consult pending  3   Hypertension--stable  Was elevated on admission  Continue all medications       4   History of CAD with stenting--continue with medical management and conservative treatment  Echocardiogram pending  5   Hyperlipidemia--continue with pravastatin  Code Status: Level 3 - DNAR and DNI      Thank you for allowing us to participate in this patient's care  This pt will follow up with Dr Ivone Harmon  once discharged  Counseling / Coordination of Care  Total floor / unit time spent today 35 minutes  Greater than 50% of total time was spent with the patient and / or family counseling and / or coordination of care  A description of the counseling / coordination of care: Review of history, current assessment, development of a plan         Tal Escamilla PA-C  12/11/2017,8:51 AM

## 2017-12-11 NOTE — CONSULTS
CONSULT    Patient Name: Yoly Rodas  Patient MRN: [id number]  Date of Service: 12/11/2017   Date / Time Note Created: 12/11/2017 8:54 AM   Referring Provider: DR Sandro Leal  Provider Creating Note: TANVI Real    PCP: Nathalie Fabian  Attending Provider:  Phoenix Buchanan MD    Reason for Consult:  Cystitis    HPI-- Ms Tobias this 69-year-old demented female with history of neurogenic bladder and chronic indwelling Davis catheter presenting to [facility] emergency room with obstructed urinary catheter, low-grade fever and lethargy  Catheter was replaced by nursing staff immediately  Patient is a poor historian secondary to mental status; but states she is known to urologist at [facility]  CT scan was positive for bladder overdistention despite Davis catheter; as well as solitary kidney without obstruction  Urine and blood cultures are pending  Patient is afebrile with normal WBCs  Creatinine trending downward from 1 73-1 42  Urologic consultation is requested against this  background  Source: chart       Active Problems:    Patient Active Problem List   Diagnosis    HTN (hypertension)    Cystitis    Elevated troponin    [facility]    [facility]F [facility]congestive heart failure) [facility]    [facility]       Impressions  History neurogenic bladder with chronic indwelling Davis catheter  Catheter obstruction  Rule out UTI--urinalysis is not done reliable diagnostic tool in patients with chronic indwelling urinary drains  Patient is afebrile and without leukocytosis  History of bladder tumor  Solitary kidney--documentation of prior history is very vague  Patient does received urologic care outside of network  Compounded by patient's mental status/poor recall  Acute kidney injury--secondary to catheter obstruction  Resolving  Recommendations  Maintain urinary catheter for chronic use  Do not remove    Not nursing or other department managed  Flush p r n  to keep catheter patent  Continue empiric antimicrobials  Await final sensitivities and narrow treatment per culture results   surgical intervention not indicated  Patient can follow up with primary urologist as prior  Past Medical History:   Diagnosis Date    Brain aneurysm     Cardiac disease     CHF (congestive heart failure) (HCC)     COPD (chronic obstructive pulmonary disease) (HCC)     Dementia     Disease of thyroid gland     Hyperlipidemia     Hypertension     MI (myocardial infarction)     Migraine     Renal disorder     [facility]     Stroke [facility]        Past Surgical History:   Procedure Laterality Date    APPENDECTOMY      BLADDER TUMOR EXCISION      BRAIN SURGERY      CARDIAC SURGERY      CORONARY STENT PLACEMENT      5 stents    EYE SURGERY      MANDIBLE FRACTURE SURGERY      TONSILLECTOMY      TUBAL LIGATION      UTERINE FIBROID SURGERY         History reviewed  No pertinent family history  Social History     Social History    Marital status:      Spouse name: N/A    Number of children: N/A    Years of education: N/A     Occupational History    Not on file       Social History Main Topics    Smoking status: Former Smoker    Smokeless tobacco: Never Used    Alcohol use No    Drug use: No    Sexual activity: Not on file     Other Topics Concern    Not on file     Social History Narrative    No narrative on file       Allergies   Allergen Reactions    Ramipril Anaphylaxis    Penicillins        Review of Systems  10 point review of systems negative except as noted in HPI  Review of Systems - History obtained from unobtainable from patient due to mental status      Chart Review   Allergies, current medications, history, problem list    Vital Signs  /60   Pulse 64   Temp 98 2 °F (36 8 °C) (Axillary)   Resp 18   Ht 5' (1 524 m)   Wt 87 1 kg (192 lb 0 3 oz)   SpO2 90%   BMI 37 50 kg/m²     Physical Exam  General appearance: alert, appears stated age, cooperative and no distress  Head: Normocephalic, without obvious abnormality, atraumatic  Throat: lips, mucosa, and tongue normal; teeth and gums normal  Lungs: clear to auscultation bilaterally  Heart: regular rate and rhythm, S1, S2 normal, no murmur, click, rub or gallop  Abdomen: soft, non-tender; bowel sounds normal; no masses,  no organomegaly  Extremities: extremities normal, atraumatic, no cyanosis or edema  Pulses: 2+ and symmetric  Neurologic: Grossly normal   FoleySecure and patent for clear wanda urine  Modest sediment  Laboratory Studies  Lab Results   Component Value Date     (H) 12/11/2017    K 3 7 12/11/2017     (H) 12/11/2017    CO2 26 12/11/2017    GLUCOSE 112 12/11/2017    CREATININE 1 42 (H) 12/11/2017    BUN 29 (H) 12/11/2017    MG 2 0 12/10/2017     Lab Results   Component Value Date    WBC 6 53 12/11/2017    RBC 3 52 (L) 12/11/2017    HGB 11 7 12/11/2017    HCT 35 7 12/11/2017     (H) 12/11/2017    MCH 33 2 12/11/2017    RDW 12 2 12/11/2017     (L) 12/11/2017       Imaging and Other Studies  )  Ct Chest Abdomen Pelvis Wo Contrast    Result Date: 12/10/2017  Narrative: CT CHEST, ABDOMEN AND PELVIS WITHOUT IV CONTRAST INDICATION:  Weakness  COMPARISON: None  TECHNIQUE: CT examination of the chest, abdomen and pelvis was performed without intravenous contrast   Reformatted images were created in axial, sagittal, and coronal planes  Radiation dose length product (DLP) for this visit:  654 mGy-cm   This examination, like all CT scans performed in the [facility], was performed utilizing techniques to minimize radiation dose exposure, including the use of iterative reconstruction and automated exposure control  Enteric contrast was administered   FINDINGS: CHEST LUNGS:  7 mm left lower lobe pulmonary nodule is noted series 3 image 46   7 mm left lower lobe pulmonary nodule is identified best seen series 602 image 101  PLEURA:  Unremarkable  HEART/GREAT VESSELS:  Extensive coronary artery calcification is present  MEDIASTINUM AND ANNE:  Unremarkable  CHEST WALL AND LOWER NECK:       Normal  ABDOMEN LIVER/BILIARY TREE:  Unremarkable  GALLBLADDER:  Cholelithiasis is present  SPLEEN:  Unremarkable  PANCREAS:  Unremarkable  ADRENAL GLANDS:  Unremarkable  KIDNEYS/URETERS:  Right kidney is absent  Left kidney is unremarkable  STOMACH AND BOWEL:  Unremarkable  APPENDIX:  No findings to suggest appendicitis  ABDOMINOPELVIC CAVITY:  No ascites or free intraperitoneal air  No lymphadenopathy  VESSELS:  Unremarkable for patient's age  PELVIS REPRODUCTIVE ORGANS:  Davis catheter is identified  Bladder remains distended  Periventricular inflammatory change suggests cystitis  URINARY BLADDER:  Unremarkable  ABDOMINAL WALL/INGUINAL REGIONS:  Fat-containing periumbilical hernia is identified  OSSEOUS STRUCTURES:  No acute fracture or destructive osseous lesion  Impression: Bladder distention despite presence of Davis catheter  Perivesicular inflammatory change suggesting cystitis  Fat-containing ventral hernia  7 mm left lower lobe pulmonary nodules  Based on current Fleischner Society 2017 Guidelines on incidental pulmonary nodule, followup non-contrast CT is recommended, initially at 3-6 months from this examination and, if stable at that time, an additional followup is recommended for 18-24 months from this exam     Workstation performed: [id number]     Xr Chest Pa & Lateral    Result Date: 11/21/2017  Narrative: CHEST - DUAL ENERGY INDICATION: 52-year-old female, cough, chest pain COMPARISON: None VIEWS:  PA (including soft tissue/bone algorithms) and lateral projections; 4 images FINDINGS: There is somewhat strand-like opacity at lateral left lung base which most likely represents subsegmental atelectasis  The lungs are otherwise clear  No pleural effusions  The cardiomediastinal silhouette is unremarkable   Bony thorax is unremarkable  Impression: Strand-like opacity at left lung base most consistent with subsegmental atelectasis Findings personally discussed by phone with Dr Fredy Bernheim at 4:50 PM, 11/21/2017 Workstation performed: [id number]     Ct Head Without Contrast    Result Date: 12/10/2017  Narrative: CT BRAIN - WITHOUT CONTRAST INDICATION:  Lethargy  Altered mental status  COMPARISON:  None  TECHNIQUE:  CT examination of the brain was performed  In addition to axial images, coronal reformatted images were created and submitted for interpretation  Radiation dose length product (DLP) for this visit:  969 mGy-cm   This examination, like all CT scans performed in the [facility], was performed utilizing techniques to minimize radiation dose exposure, including the use of iterative reconstruction and automated exposure control  IMAGE QUALITY:  Diagnostic  FINDINGS:  PARENCHYMA:  Decreased attenuation is noted in the supratentorial white matter demonstrating an appearance most consistent with moderate microangiopathic change  No intracranial mass, mass effect or midline shift  No CT signs of acute infarction  There is no parenchymal hemorrhage  Right frontal encephalomalacia is noted  Patient is status post remote aneurysm clipping  VENTRICLES AND EXTRA-AXIAL SPACES:  Enlargement of ventricles and extra-axial CSF spaces consistent with cerebral and cerebellar atrophy  VISUALIZED ORBITS AND PARANASAL SINUSES:  Unremarkable  CALVARIUM AND EXTRACRANIAL SOFT TISSUES:  Patient is status post right frontal craniotomy  Impression: No acute intracranial abnormality   Workstation performed: [id number]       Medications   Scheduled Meds:  amLODIPine 10 mg Oral Daily   aspirin 81 mg Oral Daily   budesonide-formoterol 2 puff Inhalation BID   buPROPion 150 mg Oral Daily   carBAMazepine 100 mg Oral TID   cefTRIAXone 1,000 mg Intravenous Q24H   cholecalciferol 2,000 Units Oral Daily   cloNIDine 0 2 mg Oral HS   divalproex sodium 500 mg Oral Daily   ferrous sulfate 325 mg Oral BID   heparin (porcine) 5,000 Units Subcutaneous Q8H [facility]   ipratropium-albuterol 3 mL Nebulization Q6H   levothyroxine 88 mcg Oral Early Morning   LORazepam 0 5 mg Oral BID   metoprolol succinate 50 mg Oral QAM   pravastatin 40 mg Oral Daily With Dinner   senna-docusate sodium 1 tablet Oral BID     Continuous Infusions:  sodium chloride 75 mL/hr     PRN Meds:   acetaminophen    albuterol    metoprolol    ondansetron      Total time spent with patient 20 minutes, >50% spent counseling and/or coordination of care           TANVI Metcalf

## 2017-12-11 NOTE — ED PROVIDER NOTES
History  Chief Complaint   Patient presents with    Urinary Catheter Problem     Pt with chronic baca reporting of discomfort and urge to urinate     Patient is a 61-year-old female with past medical and surgical history of coronary artery disease status post 3 myocardial infarctions and a total of 5 stents, COPD, congestive heart failure, prior stroke, ruptured brain aneurysm status post surgical repair, seizure disorder, hypertension, hyperlipidemia, hypothyroidism, dementia, appendectomy, bladder tumor status post surgical resection, neurogenic bladder with chronic indwelling Baca catheter, uterine fibroid surgery, tubal ligation, presents to the emergency department with her daughter for possible urinary tract infection  Daughter provided majority of the history due to the patient's dementia  Daughter states she was seen here 2-3 weeks ago for chest pain and was ultimately diagnosed with bronchitis  She has had a cough for the past several weeks that has not gotten better  Daughter also has noticed that she has been more lethargic and not quite herself for the past 2 weeks  Daughter states patient has broken down into tears multiple times which is unusual for her  Daughter states that today, patient was complaining of lower abdominal/pelvic pain which patient describes as a pressure  She also has had increased urinary urgency however very little urine has come from the Baca bag all day  Daughter is concerned that the catheter has become dislodged or that patient may have a UTI  She also had a low-grade temperature of 99 4° today  Patient's only complaint is the lower abdominal/pelvic pressure/discomfort  On review of systems, she has chronic lower extremity edema which is worse at the end of the day  Patient also reports she has had subjective fever and chills   She denies any headache, recent head injury or fall, dizziness or near syncope, URI symptoms, neck or back pain, chest pain, palpitations, dyspnea, upper abdominal pain, nausea, vomiting, diarrhea, constipation, blood per rectum or melena, dysuria, hematuria, flank pain, skin rash or color change, lateralizing extremity weakness or paresthesia or other focal neurologic deficits  Daughter states she has had urinary tract infections in the past but due to the fact that she has a chronic Davis catheter, she is always going to be colonized and her family doctor usually only treats her for infection if there other symptoms present besides an abnormal urinalysis  History provided by:  Patient and relative   used: No        Prior to Admission Medications   Prescriptions Last Dose Informant Patient Reported? Taking?    Cholecalciferol (VITAMIN D) 2000 units CAPS   Yes Yes   Sig: Take by mouth   LORazepam (ATIVAN) 0 5 mg tablet   Yes Yes   Sig: Take 0 5 mg by mouth 2 (two) times a day   Levothyroxine Sodium 88 MCG CAPS   Yes Yes   Sig: Take by mouth every morning   Sennosides-Docusate Sodium (SENNA PLUS PO)   Yes Yes   Sig: Take by mouth daily as needed   acetaminophen (TYLENOL) 500 mg tablet   Yes Yes   Sig: Take 500 mg by mouth every 6 (six) hours as needed for mild pain   albuterol (2 5 mg/3 mL) 0 083 % nebulizer solution   Yes Yes   Sig: Take 2 5 mg by nebulization every 6 (six) hours as needed for wheezing   albuterol (PROVENTIL HFA,VENTOLIN HFA) 90 mcg/act inhaler   Yes Yes   Sig: Inhale 2 puffs every 6 (six) hours as needed for wheezing   amLODIPine (NORVASC) 10 mg tablet   Yes Yes   Sig: Take 10 mg by mouth daily   aspirin (ECOTRIN LOW STRENGTH) 81 mg EC tablet   Yes Yes   Sig: Take 81 mg by mouth daily   buPROPion (WELLBUTRIN SR) 150 mg 12 hr tablet   Yes Yes   Sig: Take 150 mg by mouth daily   budesonide-formoterol (SYMBICORT) 160-4 5 mcg/act inhaler   Yes Yes   Sig: Inhale 2 puffs 2 (two) times a day   carBAMazepine (TEGretol) 100 mg chewable tablet   Yes Yes   Sig: Chew 100 mg 3 (three) times a day cloNIDine (CATAPRES) 0 1 mg tablet   Yes Yes   Sig: Take 0 2 mg by mouth daily at bedtime   divalproex sodium (DEPAKOTE ER) 250 mg 24 hr tablet   Yes Yes   Sig: Take 500 mg by mouth daily   fenofibrate micronized (LOFIBRA) 67 MG capsule   Yes Yes   Sig: Take 67 mg by mouth every morning before breakfast   ferrous sulfate 325 (65 Fe) mg tablet   Yes Yes   Sig: Take 325 mg by mouth 2 (two) times a day   ipratropium-albuterol (DUO-NEB) 0 5-2 5 mg/mL   Yes Yes   Sig: Take 3 mL by nebulization every 6 (six) hours   metoprolol tartrate (LOPRESSOR) 50 mg tablet   Yes Yes   Sig: Take 50 mg by mouth every morning   simvastatin (ZOCOR) 20 mg tablet   Yes Yes   Sig: Take 20 mg by mouth daily at bedtime      Facility-Administered Medications: None       Past Medical History:   Diagnosis Date    Brain aneurysm     Cardiac disease     CHF (congestive heart failure) (HCC)     COPD (chronic obstructive pulmonary disease) (HCC)     Dementia     Disease of thyroid gland     Hyperlipidemia     Hypertension     MI (myocardial infarction)     Migraine     Renal disorder     [facility] [address] [facility]     [facility]        Past Surgical History:   Procedure Laterality Date    APPENDECTOMY      BLADDER TUMOR EXCISION      BRAIN SURGERY      CARDIAC SURGERY      CORONARY STENT PLACEMENT      5 stents    EYE SURGERY      MANDIBLE FRACTURE SURGERY      TONSILLECTOMY      TUBAL LIGATION      UTERINE FIBROID SURGERY         History reviewed  No pertinent family history  I have reviewed and agree with the history as documented  Social History   Substance Use Topics    Smoking status: Former Smoker    Smokeless tobacco: Never Used    Alcohol use No        Review of Systems   Constitutional: Positive for chills and fatigue  Negative for appetite change, diaphoresis and fever  HENT: Negative for congestion, ear pain, rhinorrhea and sore throat  Eyes: Negative for pain and visual disturbance     Respiratory: Positive for cough  Negative for chest tightness, shortness of breath and wheezing  Cardiovascular: Negative for chest pain and palpitations  Gastrointestinal: Positive for abdominal pain  Negative for abdominal distention, blood in stool, constipation, diarrhea, nausea and vomiting  Genitourinary: Positive for decreased urine volume, difficulty urinating, pelvic pain and urgency  Negative for dysuria, flank pain, frequency and hematuria  Musculoskeletal: Negative for back pain and neck pain  Skin: Negative for color change, pallor and rash  Allergic/Immunologic: Negative for immunocompromised state  Neurological: Negative for dizziness, syncope, facial asymmetry, speech difficulty, weakness, light-headedness, numbness and headaches  Hematological: Negative for adenopathy  Psychiatric/Behavioral: Positive for confusion  Negative for decreased concentration  All other systems reviewed and are negative  Physical Exam  ED Triage Vitals   Temperature Pulse Respirations Blood Pressure SpO2   12/10/17 2039 12/10/17 2037 12/10/17 2037 12/10/17 2037 12/10/17 2037   98 4 °F (36 9 °C) 96 22 (!) 232/111 91 %      Temp Source Heart Rate Source Patient Position - Orthostatic VS BP Location FiO2 (%)   12/10/17 2039 12/10/17 2037 12/10/17 2037 12/10/17 2037 --   Oral Monitor Lying Right arm       Pain Score       --                Vitals:    12/10/17 2037 12/10/17 2039 12/10/17 2225   BP: (!) 232/111  (!) 199/82   Pulse: 96  86   Resp: 22  22   Temp:  98 4 °F (36 9 °C)    TempSrc:  Oral    SpO2: 91%  92%   Weight: 87 1 kg (192 lb 0 3 oz)     Height: 5' (1 524 m)       Physical Exam   Constitutional: She appears well-developed and well-nourished  No distress  HENT:   Head: Normocephalic and atraumatic  Mouth/Throat: Oropharynx is clear and moist  No oropharyngeal exudate  Eyes: Conjunctivae and EOM are normal  Pupils are equal, round, and reactive to light  Neck: Normal range of motion  Neck supple   No JVD present  Cardiovascular: Normal rate, regular rhythm, normal heart sounds and intact distal pulses  Exam reveals no gallop and no friction rub  No murmur heard  Pulmonary/Chest: Effort normal and breath sounds normal  No respiratory distress  She has no wheezes  She has no rales  Abdominal: Soft  Bowel sounds are normal  She exhibits distension  There is tenderness  There is no rebound and no guarding  Suprapubic abdominal tenderness  Musculoskeletal: Normal range of motion  She exhibits no edema or tenderness  Lymphadenopathy:     She has no cervical adenopathy  Neurological: She is alert  No cranial nerve deficit  Oriented to person and place but not to time  This is patient's baseline according to daughter  No gross motor or sensory deficits  5/5 strength throughout  Normal speech  Skin: Skin is warm and dry  No rash noted  She is not diaphoretic  No erythema  No pallor  Psychiatric: She has a normal mood and affect  Her behavior is normal    Nursing note and vitals reviewed  ED Medications  Medications   cefTRIAXone (ROCEPHIN) IVPB (premix) 1,000 mg (1,000 mg Intravenous New Bag 12/10/17 2305)   sodium chloride 0 9 % bolus 500 mL (500 mL Intravenous New Bag 12/10/17 2129)   lidocaine (URO-JET) 2 % topical gel ( Topical Given 12/10/17 2231)   labetalol (NORMODYNE) injection 10 mg (10 mg Intravenous Given 12/10/17 2227)       Diagnostic Studies  Results Reviewed     Procedure Component Value Units Date/Time    Urine Microscopic [[id number]]  (Abnormal) Collected:  12/10/17 2254    Lab Status:  Final result Specimen:  Urine from Urine, Clean Catch Updated:  12/10/17 2313     RBC, UA 4-10 (A) /hpf      WBC, UA 10-20 (A) /hpf      Epithelial Cells Occasional /hpf      Bacteria, UA Moderate (A) /hpf      WBC Clumps present    Urine culture [[id number]] Collected:  12/10/17 2254    Lab Status:   In process Specimen:  Urine from Urine, Clean Catch Updated:  12/10/17 2313    UA w Reflex to Microscopic w Reflex to Culture [[id number]]  (Abnormal) Collected:  12/10/17 2254    Lab Status:  Final result Specimen:  Urine from Urine, Clean Catch Updated:  12/10/17 2302     Color, UA Yellow     Clarity, UA Clear     Specific [place], UA 1 020     pH, UA 7 0     Leukocytes, UA Small (A)     Nitrite, UA Positive (A)     Protein, UA 30 (1+) (A) mg/dl      Glucose, UA Negative mg/dl      Ketones, UA Negative mg/dl      Urobilinogen, UA 0 2 E U /dl      Bilirubin, UA Negative     Blood, UA Moderate (A)    Blood culture #1 [[id number]] Collected:  12/10/17 2121    Lab Status: In process Specimen:  Blood from Arm, Right Updated:  12/10/17 2259    Blood culture #2 [[id number]] Collected:  12/10/17 2130    Lab Status: In process Specimen:  Blood from Arm, Left Updated:  12/10/17 2259    Lipase [[id number]]  (Normal) Collected:  12/10/17 2123    Lab Status:  Final result Specimen:  Blood from Arm, Right Updated:  12/10/17 2157     Lipase 232 u/L     B-type natriuretic peptide [[id number]]  (Abnormal) Collected:  12/10/17 2123    Lab Status:  Final result Specimen:  Blood from Arm, Right Updated:  12/10/17 2157     NT-proBNP 958 (H) pg/mL     Magnesium [[id number]]  (Normal) Collected:  12/10/17 2123    Lab Status:  Final result Specimen:  Blood from Arm, Right Updated:  12/10/17 2157     Magnesium 2 0 mg/dL     Troponin I [[id number]]  (Abnormal) Collected:  12/10/17 2123    Lab Status:  Final result Specimen:  Blood from Arm, Right Updated:  12/10/17 2157     Troponin I 0 08 (H) ng/mL     Narrative:         [employer] Chemistry analyzer 99% cutoff is > 0 04 ng/mL in network labs    o cTnI 99% cutoff is useful only when applied to patients in the clinical setting of myocardial ischemia  o cTnI 99% cutoff should be interpreted in the context of clinical history, ECG findings and possibly cardiac imaging to establish correct diagnosis    o cTnI 99% cutoff may be suggestive but clearly not indicative of a coronary event without the clinical setting of myocardial ischemia  Lactic acid, plasma [[id number]]  (Normal) Collected:  12/10/17 2123    Lab Status:  Final result Specimen:  Blood from Arm, Right Updated:  12/10/17 2154     LACTIC ACID 1 3 mmol/L     Narrative:         Result may be elevated if tourniquet was used during collection  Comprehensive metabolic panel [[id number]]  (Abnormal) Collected:  12/10/17 2123    Lab Status:  Final result Specimen:  Blood from Arm, Right Updated:  12/10/17 2152     Sodium 147 (H) mmol/L      Potassium 3 8 mmol/L      Chloride 109 (H) mmol/L      CO2 27 mmol/L      Anion Gap 11 mmol/L      BUN 31 (H) mg/dL      Creatinine 1 73 (H) mg/dL      Glucose 166 (H) mg/dL      Calcium 9 8 mg/dL      AST 14 U/L      ALT 18 U/L      Alkaline Phosphatase 55 U/L      Total Protein 7 6 g/dL      Albumin 3 6 g/dL      Total Bilirubin 0 20 mg/dL      eGFR 29 ml/min/1 73sq m     Narrative:         National Kidney Disease Education Program recommendations are as follows:  GFR calculation is accurate only with a steady state creatinine  Chronic Kidney disease less than 60 ml/min/1 73 sq  meters  Kidney failure less than 15 ml/min/1 73 sq  meters  APTT [[id number]]  (Normal) Collected:  12/10/17 2123    Lab Status:  Final result Specimen:  Blood from Arm, Right Updated:  12/10/17 2147     PTT 25 seconds     Narrative:          Therapeutic Heparin Range = 60-90 seconds    Protime-INR [[id number]]  (Normal) Collected:  12/10/17 2123    Lab Status:  Final result Specimen:  Blood from Arm, Right Updated:  12/10/17 2146     Protime 13 9 seconds      INR 1 05    CBC and differential [[id number]]  (Abnormal) Collected:  12/10/17 2123    Lab Status:  Final result Specimen:  Blood from Arm, Right Updated:  12/10/17 2133     WBC 8 30 Thousand/uL      RBC 3 90 Million/uL      Hemoglobin 12 9 g/dL      Hematocrit 39 6 %       (H) fL      MCH 33 1 pg      MCHC 32 6 g/dL      RDW 12 2 %      MPV 11 7 fL      Platelets 266 Thousands/uL      nRBC 0 /100 WBCs      Neutrophils Relative 64 %      Lymphocytes Relative 22 %      Monocytes Relative 10 %      Eosinophils Relative 4 %      Basophils Relative 0 %      Neutrophils Absolute 5 30 Thousands/µL      Lymphocytes Absolute 1 83 Thousands/µL      Monocytes Absolute 0 79 Thousand/µL      Eosinophils Absolute 0 30 Thousand/µL      Basophils Absolute 0 03 Thousands/µL                  CT chest abdomen pelvis wo contrast   Final Result by Ernesto Ceballos MD (12/10 2116)      Bladder distention despite presence of Davis catheter  Perivesicular inflammatory change suggesting cystitis  Fat-containing ventral hernia  7 mm left lower lobe pulmonary nodules  Based on current Fleischner Society 2017 Guidelines on incidental pulmonary nodule, followup non-contrast CT is recommended, initially at 3-6 months from this examination and, if stable at that time, an    additional followup is recommended for 18-24 months from this exam                      Workstation performed: [id number]         CT head without contrast   Final Result by Ernesto Ceballos MD (12/10 2104)      No acute intracranial abnormality  Workstation performed: [id number]                    Procedures  ECG 12 Lead Documentation  Date/Time: 12/10/2017 9:42 PM  Performed by: Jez Treadwell by: Brianna Dolan     ECG reviewed by me, the ED Provider: yes    Patient location:  ED  Previous ECG:     Previous ECG:  Compared to current    Comparison ECG info:  11-    Similarity:  No change  Rate:     ECG rate:  91    ECG rate assessment: normal    Rhythm:     Rhythm: sinus rhythm    Ectopy:     Ectopy: none    QRS:     QRS axis:  Normal    QRS intervals:  Normal  Conduction:     Conduction: normal    ST segments:     ST segments: Nonspecific ST and T-wave abnormality    Other findings:     Other findings: prolonged qTc interval             Phone Contacts  ED Phone Contact    ED Course  ED Course as of Dec 10 2331   Ramón Poon Dec 10, 2017   2100 Patient in pain and has no signs/symptoms concerning for malignant hypertension  Will recheck BP prior to treating HTN  Blood Pressure: (!) 232/111   2203 Patient denies chest pain  Troponin I: (!) 0 08   2204 Worse than baseline  Creatinine: (!) 1 73   2222 Patient had difficulty tolerating the insertion of the new baca catheter  Will order lidocaine gel (urojet) and have nursing reattempt  MDM  Number of Diagnoses or Management Options  Diagnosis management comments: Lethargy x2 weeks as well as cough and recent diagnosis of bronchitis  Patient also has had increased urinary urgency but very little urine coming from the Baca catheter  Will check cardiac and abdominal labs including lactic acid for possible sepsis given the altered mental status and concern for UTI  Will exchange Baca catheter for a new catheter and obtain a sterile urine specimen for urinalysis and culture  Given that she has been lethargic and has significant neurologic history, will obtain a CT scan of the head  Will also obtain a CT scan of the chest/abdomen/pelvis to rule out pneumonia, and intra-abdominal process         Amount and/or Complexity of Data Reviewed  Clinical lab tests: ordered and reviewed  Tests in the radiology section of CPT®: ordered and reviewed  Tests in the medicine section of CPT®: ordered and reviewed  Obtain history from someone other than the patient: yes  Independent visualization of images, tracings, or specimens: yes      CritCare Time    Disposition  Final diagnoses:   Acute cystitis   Suprapubic pain   Elevated troponin   Uncontrolled hypertension   Acute on chronic renal insufficiency     Time reflects when diagnosis was documented in both MDM as applicable and the Disposition within this note     Time User Action Codes Description Comment    12/10/2017 10:13 PM Caterina CORTES Add [N30 00] Acute cystitis     12/10/2017 10:13 PM Gumaro Stonerhrie E Add [R10 2] Suprapubic pain     12/10/2017 10:13 PM Gumaro Hernandez E Add [R74 8] Elevated troponin     12/10/2017 10:15 PM Gumaro Hernandez E Add [I10] Uncontrolled hypertension     12/10/2017 10:16 PM Gumaro Hernandez E Add [N28 9,  N18 9] Acute on chronic renal insufficiency     12/10/2017 10:57 PM Ernesto Feldman Add [Z92 89] Chronic indwelling Davis catheter       ED Disposition     ED Disposition Condition Comment    Admit  Case was discussed with HANANE Castaneda) and the patient's admission status was agreed to be Admission Status: inpatient status to the service of Dr Ludivina Dwyer          Follow-up Information    None       Patient's Medications   Discharge Prescriptions    No medications on file     No discharge procedures on file      ED Provider  Electronically Signed by           Michael Hartman DO  12/10/17 5728

## 2017-12-12 LAB
ANION GAP SERPL CALCULATED.3IONS-SCNC: 7 MMOL/L (ref 4–13)
BASOPHILS # BLD AUTO: 0.03 THOUSANDS/ΜL (ref 0–0.1)
BASOPHILS NFR BLD AUTO: 1 % (ref 0–1)
BUN SERPL-MCNC: 21 MG/DL (ref 5–25)
CALCIUM SERPL-MCNC: 9.2 MG/DL (ref 8.3–10.1)
CHLORIDE SERPL-SCNC: 109 MMOL/L (ref 100–108)
CO2 SERPL-SCNC: 28 MMOL/L (ref 21–32)
CREAT SERPL-MCNC: 1.24 MG/DL (ref 0.6–1.3)
EOSINOPHIL # BLD AUTO: 0.33 THOUSAND/ΜL (ref 0–0.61)
EOSINOPHIL NFR BLD AUTO: 6 % (ref 0–6)
ERYTHROCYTE [DISTWIDTH] IN BLOOD BY AUTOMATED COUNT: 11.9 % (ref 11.6–15.1)
GFR SERPL CREATININE-BSD FRML MDRD: 44 ML/MIN/1.73SQ M
GLUCOSE SERPL-MCNC: 116 MG/DL (ref 65–140)
HCT VFR BLD AUTO: 35.9 % (ref 34.8–46.1)
HGB BLD-MCNC: 11.7 G/DL (ref 11.5–15.4)
LYMPHOCYTES # BLD AUTO: 1.97 THOUSANDS/ΜL (ref 0.6–4.47)
LYMPHOCYTES NFR BLD AUTO: 36 % (ref 14–44)
MCH RBC QN AUTO: 33.2 PG (ref 26.8–34.3)
MCHC RBC AUTO-ENTMCNC: 32.6 G/DL (ref 31.4–37.4)
MCV RBC AUTO: 102 FL (ref 82–98)
MONOCYTES # BLD AUTO: 0.56 THOUSAND/ΜL (ref 0.17–1.22)
MONOCYTES NFR BLD AUTO: 10 % (ref 4–12)
NEUTROPHILS # BLD AUTO: 2.56 THOUSANDS/ΜL (ref 1.85–7.62)
NEUTS SEG NFR BLD AUTO: 47 % (ref 43–75)
NRBC BLD AUTO-RTO: 0 /100 WBCS
PLATELET # BLD AUTO: 133 THOUSANDS/UL (ref 149–390)
PMV BLD AUTO: 10.9 FL (ref 8.9–12.7)
POTASSIUM SERPL-SCNC: 4.3 MMOL/L (ref 3.5–5.3)
RBC # BLD AUTO: 3.52 MILLION/UL (ref 3.81–5.12)
SODIUM SERPL-SCNC: 144 MMOL/L (ref 136–145)
WBC # BLD AUTO: 5.5 THOUSAND/UL (ref 4.31–10.16)

## 2017-12-12 PROCEDURE — 85025 COMPLETE CBC W/AUTO DIFF WBC: CPT | Performed by: INTERNAL MEDICINE

## 2017-12-12 PROCEDURE — 80048 BASIC METABOLIC PNL TOTAL CA: CPT | Performed by: INTERNAL MEDICINE

## 2017-12-12 PROCEDURE — 94760 N-INVAS EAR/PLS OXIMETRY 1: CPT

## 2017-12-12 PROCEDURE — 94640 AIRWAY INHALATION TREATMENT: CPT

## 2017-12-12 RX ORDER — ALBUTEROL SULFATE 2.5 MG/3ML
2.5 SOLUTION RESPIRATORY (INHALATION) EVERY 4 HOURS PRN
Status: DISCONTINUED | OUTPATIENT
Start: 2017-12-12 | End: 2017-12-14 | Stop reason: HOSPADM

## 2017-12-12 RX ADMIN — IPRATROPIUM BROMIDE AND ALBUTEROL SULFATE 3 ML: .5; 3 SOLUTION RESPIRATORY (INHALATION) at 13:47

## 2017-12-12 RX ADMIN — ASPIRIN 81 MG: 81 TABLET, COATED ORAL at 08:36

## 2017-12-12 RX ADMIN — HEPARIN SODIUM 5000 UNITS: 5000 INJECTION, SOLUTION INTRAVENOUS; SUBCUTANEOUS at 13:39

## 2017-12-12 RX ADMIN — Medication 1 TABLET: at 08:37

## 2017-12-12 RX ADMIN — METOPROLOL SUCCINATE 50 MG: 25 TABLET, FILM COATED, EXTENDED RELEASE ORAL at 08:37

## 2017-12-12 RX ADMIN — CLONIDINE HYDROCHLORIDE 0.2 MG: 0.1 TABLET ORAL at 21:35

## 2017-12-12 RX ADMIN — BUDESONIDE AND FORMOTEROL FUMARATE DIHYDRATE 2 PUFF: 160; 4.5 AEROSOL RESPIRATORY (INHALATION) at 08:40

## 2017-12-12 RX ADMIN — LORAZEPAM 0.5 MG: 0.5 TABLET ORAL at 13:39

## 2017-12-12 RX ADMIN — VITAMIN D, TAB 1000IU (100/BT) 2000 UNITS: 25 TAB at 08:36

## 2017-12-12 RX ADMIN — Medication 325 MG: at 17:08

## 2017-12-12 RX ADMIN — CARBAMAZEPINE 100 MG: 100 TABLET, CHEWABLE ORAL at 08:36

## 2017-12-12 RX ADMIN — IPRATROPIUM BROMIDE AND ALBUTEROL SULFATE 3 ML: .5; 3 SOLUTION RESPIRATORY (INHALATION) at 19:35

## 2017-12-12 RX ADMIN — Medication 1 TABLET: at 17:08

## 2017-12-12 RX ADMIN — CARBAMAZEPINE 100 MG: 100 TABLET, CHEWABLE ORAL at 21:35

## 2017-12-12 RX ADMIN — CARBAMAZEPINE 100 MG: 100 TABLET, CHEWABLE ORAL at 17:08

## 2017-12-12 RX ADMIN — Medication 325 MG: at 08:37

## 2017-12-12 RX ADMIN — HEPARIN SODIUM 5000 UNITS: 5000 INJECTION, SOLUTION INTRAVENOUS; SUBCUTANEOUS at 21:36

## 2017-12-12 RX ADMIN — HEPARIN SODIUM 5000 UNITS: 5000 INJECTION, SOLUTION INTRAVENOUS; SUBCUTANEOUS at 05:16

## 2017-12-12 RX ADMIN — BUPROPION HYDROCHLORIDE 150 MG: 150 TABLET, FILM COATED, EXTENDED RELEASE ORAL at 08:36

## 2017-12-12 RX ADMIN — LORAZEPAM 0.5 MG: 0.5 TABLET ORAL at 18:27

## 2017-12-12 RX ADMIN — BUDESONIDE AND FORMOTEROL FUMARATE DIHYDRATE 2 PUFF: 160; 4.5 AEROSOL RESPIRATORY (INHALATION) at 17:08

## 2017-12-12 RX ADMIN — CEFTRIAXONE 1000 MG: 1 INJECTION, SOLUTION INTRAVENOUS at 22:44

## 2017-12-12 RX ADMIN — IPRATROPIUM BROMIDE AND ALBUTEROL SULFATE 3 ML: .5; 3 SOLUTION RESPIRATORY (INHALATION) at 07:33

## 2017-12-12 RX ADMIN — LEVOTHYROXINE SODIUM 88 MCG: 88 TABLET ORAL at 05:16

## 2017-12-12 RX ADMIN — AMLODIPINE BESYLATE 10 MG: 10 TABLET ORAL at 08:37

## 2017-12-12 RX ADMIN — DIVALPROEX SODIUM 500 MG: 250 TABLET, FILM COATED, EXTENDED RELEASE ORAL at 08:36

## 2017-12-12 RX ADMIN — PRAVASTATIN SODIUM 40 MG: 40 TABLET ORAL at 17:08

## 2017-12-12 NOTE — PROGRESS NOTES
Progress Note - Cardiology     Rachell Davis 71 y o  female MRN: [id number]  Unit/Bed#: -01 Encounter: [id number]      Subjective:   No significant events overnight  Patient denies chest pain, SOB, palpitations, lightheadedness  REVIEW OF SYSTEMS:  Constitutional: Denies fever or chills   Respiratory: Denies cough, expectoration or shortness of breath   Cardiovascular: Denies chest pain, palpitations or edema   GI: Denies abdominal pain, nausea, vomiting, bloody stools or diarrhea   Neurologic: Denies headache, focal weakness or sensory changes   Psychiatric: Denies depression or anxiety       Objective:   Vitals:  Vitals:    12/12/17 1100   BP: 148/67   Pulse: 65   Resp: 18   Temp: 98 4 °F (36 9 °C)   SpO2: 95%       Body mass index is 37 5 kg/m²  Systolic (36JVD), NGF:927 , Min:129 , FDI:551     Diastolic (16DWP), WED:27, Min:60, Max:84      Intake/Output Summary (Last 24 hours) at 12/12/17 1138  Last data filed at 12/12/17 1100   Gross per 24 hour   Intake              480 ml   Output             1250 ml   Net             -770 ml     Weight (last 2 days)     Date/Time   Weight    12/10/17 2300  87 1 (192 02)    12/10/17 2037  87 1 (192 02)              PHYSICAL EXAM:  General: Patient is not in acute distress  Laying comfortably in the bed  Awake, responding to commands  Head: Normocephalic  Atraumatic  HEENT: Both pupils normal sized, round and reactive to light  Nonicteric  Neck: JVP not raised  Trachea central  No thyromegaly  Respiratory: Bilateral bronchovascular breath sounds with no crackles or rhonchi  Cardiovascular: RRR  S1 and S2 normal  No murmur, rub or gallop  GI: Abdomen soft, nontender  No guarding or rigidity  Liver and spleen not palpable  Bowel sounds present  Neurologic: Patient is awake, responding to command  Moving all extremities    Patient has baseline dementia  Integumentary:  No skin rash  Lymphatic: No cervical lymphadenopathy  Extremities: No clubbing, cyanosis or edema      LABORATORY RESULTS:    Results from last 7 days  Lab Units 12/11/17  0320 12/11/17  0104 12/10/17  2123   TROPONIN I ng/mL 0 11* 0 11* 0 08*     CBC with diff:   Results from last 7 days  Lab Units 12/12/17  0446 12/11/17  0326 12/11/17  0104 12/10/17  2123   WBC Thousand/uL 5 50 6 53  --  8 30   HEMOGLOBIN g/dL 11 7 11 7  --  12 9   HEMATOCRIT % 35 9 35 7  --  39 6   MCV fL 102* 101*  --  102*   PLATELETS Thousands/uL 133* 134* 133* 153   MCH pg 33 2 33 2  --  33 1   MCHC g/dL 32 6 32 8  --  32 6   RDW % 11 9 12 2  --  12 2   MPV fL 10 9 11 0 11 2 11 7   NRBC AUTO /100 WBCs 0  --   --  0       CMP:  Results from last 7 days  Lab Units 12/12/17  0446 12/11/17  0326 12/10/17  2123   SODIUM mmol/L 144 147* 147*   POTASSIUM mmol/L 4 3 3 7 3 8   CHLORIDE mmol/L 109* 111* 109*   CO2 mmol/L 28 26 27   ANION GAP mmol/L 7 10 11   BUN mg/dL 21 29* 31*   CREATININE mg/dL 1 24 1 42* 1 73*   GLUCOSE RANDOM mg/dL 116 112 166*   CALCIUM mg/dL 9 2 9 1 9 8   AST U/L  --   --  14   ALT U/L  --   --  18   ALK PHOS U/L  --   --  55   TOTAL PROTEIN g/dL  --   --  7 6   ALBUMIN g/dL  --   --  3 6   BILIRUBIN TOTAL mg/dL  --   --  0 20   EGFR ml/min/1 73sq m 44 37 29       BMP:  Results from last 7 days  Lab Units 12/12/17  0446 12/11/17  0326 12/10/17  2123   SODIUM mmol/L 144 147* 147*   POTASSIUM mmol/L 4 3 3 7 3 8   CHLORIDE mmol/L 109* 111* 109*   CO2 mmol/L 28 26 27   BUN mg/dL 21 29* 31*   CREATININE mg/dL 1 24 1 42* 1 73*   GLUCOSE RANDOM mg/dL 116 112 166*   CALCIUM mg/dL 9 2 9 1 9 8         Results from last 7 days  Lab Units 12/10/17  2123   NT-PRO BNP pg/mL 958*          Results from last 7 days  Lab Units 12/10/17  2123   MAGNESIUM mg/dL 2 0                 Results from last 7 days  Lab Units 12/10/17  2123   INR  1 05         Cardiac testing:   Results for orders placed during the hospital encounter of 12/10/17   Echo complete with contrast if indicated    Narrative [address]  [place], [place] [address]  [phone]    Transthoracic Echocardiogram  2D, M-mode, Doppler, and Color Doppler    Study date:  11-Dec-2017    Patient: Carolina Nunn  MR number: [id number]  Account number: [de-identified]  : 1945  Age: 71 years  Gender: Female  Status: Inpatient  Location: Bedside  Height: 60 in  Weight: 192 lb  BP: 99/ 50 mmHg    Indications: Shortness of breath  Diagnoses: R06 00 - Dyspnea, unspecified, R06 02 - Shortness of breath    Sonographer:  Eileen Polo  Interpreting Physician:  Lissette Vasques MD  Referring Physician:  Vi Calvillo PA-C  Group:  [facility]    SUMMARY    LEFT VENTRICLE:  Systolic function was normal  Ejection fraction was estimated in the range of 55 % to 65 %  There were no regional wall motion abnormalities  There was mild concentric hypertrophy  Doppler parameters were consistent with abnormal left ventricular relaxation (grade 1 diastolic dysfunction)  MITRAL VALVE:  There was mild annular calcification  There was mild regurgitation  AORTIC VALVE:  There was mild regurgitation  TRICUSPID VALVE:  There was mild regurgitation  PULMONIC VALVE:  There was mild regurgitation  HISTORY: PRIOR HISTORY: Elevated trponin, Dementia, Seizures, Stroke, Myocardial infarction  Congestive heart failure  Risk factors: hypertension and hypercholesterolemia  Chronic lung disease  PROCEDURE: The procedure was performed at the bedside  This was a routine study  The transthoracic approach was used  The study included complete 2D imaging, M-mode, complete spectral Doppler, and color Doppler  The heart rate was 68 bpm,  at the start of the study  Images were obtained from the parasternal, apical, subcostal, and suprasternal notch acoustic windows  Echocardiographic views were limited due to poor acoustic window availability, decreased penetration, and  lung interference   This was a technically difficult study     LEFT VENTRICLE: Size was normal  Systolic function was normal  Ejection fraction was estimated in the range of 55 % to 65 %  There were no regional wall motion abnormalities  Wall thickness was normal  There was mild concentric  hypertrophy  DOPPLER: Doppler parameters were consistent with abnormal left ventricular relaxation (grade 1 diastolic dysfunction)  RIGHT VENTRICLE: The size was normal  Systolic function was normal  Wall thickness was normal     LEFT ATRIUM: Size was normal     RIGHT ATRIUM: Size was normal     MITRAL VALVE: There was mild annular calcification  Valve structure was normal  There was normal leaflet separation  DOPPLER: The transmitral velocity was within the normal range  There was no evidence for stenosis  There was mild  regurgitation  AORTIC VALVE: The valve was trileaflet  Leaflets exhibited normal thickness and normal cuspal separation  DOPPLER: Transaortic velocity was within the normal range  There was no evidence for stenosis  There was mild regurgitation  TRICUSPID VALVE: The valve structure was normal  There was normal leaflet separation  DOPPLER: The transtricuspid velocity was within the normal range  There was no evidence for stenosis  There was mild regurgitation  PULMONIC VALVE: Leaflets exhibited normal thickness, no calcification, and normal cuspal separation  DOPPLER: The transpulmonic velocity was within the normal range  There was mild regurgitation  PERICARDIUM: There was no pericardial effusion  The pericardium was normal in appearance  AORTA: The root exhibited normal size      SYSTEM MEASUREMENT TABLES    2D  %FS: 29 2 %  Ao Diam: 3 1 cm  EDV(Teich): 140 8 ml  EF(Teich): 55 6 %  ESV(Teich): 62 5 ml  IVSd: 1 1 cm  LA Area: 20 9 cm2  LA Diam: 3 7 cm  LVEDV MOD A4C: 117 1 ml  LVEF MOD A4C: 50 3 %  LVESV MOD A4C: 58 2 ml  LVIDd: 5 4 cm  LVIDs: 3 8 cm  LVLd A4C: 8 5 cm  LVLs A4C: 7 1 cm  LVPWd: 1 1 cm  RA Area: 17 5 cm2  RVIDd: 3 2 cm  SV MOD A4C: 59 ml  SV(Teich): 78 3 ml    CW  AR Dec [place]: 1 9 m/s2  AR Dec Time: 2208 1 ms  AR PHT: 640 4 ms  AR Vmax: 4 1 m/s  AR maxP 4 mmHg  TR Vmax: 2 5 m/s  TR maxP mmHg    MM  TAPSE: 1 4 cm    PW  AVC: 389 7 ms  E': 0 1 m/s  E/E': 15 3  MV A Anoop: 1 m/s  MV Dec [place]: 3 4 m/s2  MV DecT: 240 3 ms  MV E Anoop: 0 8 m/s  MV E/A Ratio: 0 8  MV PHT: 69 7 ms  MVA By PHT: 3 2 cm2    [employer] Accredited Echocardiography Laboratory    Prepared and electronically signed by    Meena Temple MD  Signed 11-Dec-2017 12:09:00       No results found for this or any previous visit  No results found for this or any previous visit  No procedure found  No results found for this or any previous visit  Imaging: I have personally reviewed pertinent reports  Procedure: Ct Chest Abdomen Pelvis Wo Contrast    Result Date: 12/10/2017  Narrative: CT CHEST, ABDOMEN AND PELVIS WITHOUT IV CONTRAST INDICATION:  Weakness  COMPARISON: None  TECHNIQUE: CT examination of the chest, abdomen and pelvis was performed without intravenous contrast   Reformatted images were created in axial, sagittal, and coronal planes  Radiation dose length product (DLP) for this visit:  654 mGy-cm   This examination, like all CT scans performed in the [facility], was performed utilizing techniques to minimize radiation dose exposure, including the use of iterative reconstruction and automated exposure control  Enteric contrast was administered  FINDINGS: CHEST LUNGS:  7 mm left lower lobe pulmonary nodule is noted series 3 image 46   7 mm left lower lobe pulmonary nodule is identified best seen series 602 image 101  PLEURA:  Unremarkable  HEART/GREAT VESSELS:  Extensive coronary artery calcification is present  MEDIASTINUM AND ANNE:  Unremarkable  CHEST WALL AND LOWER NECK:       Normal  ABDOMEN LIVER/BILIARY TREE:  Unremarkable  GALLBLADDER:  Cholelithiasis is present  SPLEEN:  Unremarkable  PANCREAS:  Unremarkable  ADRENAL GLANDS:  Unremarkable  KIDNEYS/URETERS:  Right kidney is absent  Left kidney is unremarkable  STOMACH AND BOWEL:  Unremarkable  APPENDIX:  No findings to suggest appendicitis  ABDOMINOPELVIC CAVITY:  No ascites or free intraperitoneal air  No lymphadenopathy  VESSELS:  Unremarkable for patient's age  PELVIS REPRODUCTIVE ORGANS:  Davis catheter is identified  Bladder remains distended  Periventricular inflammatory change suggests cystitis  URINARY BLADDER:  Unremarkable  ABDOMINAL WALL/INGUINAL REGIONS:  Fat-containing periumbilical hernia is identified  OSSEOUS STRUCTURES:  No acute fracture or destructive osseous lesion  Impression: Bladder distention despite presence of Davis catheter  Perivesicular inflammatory change suggesting cystitis  Fat-containing ventral hernia  7 mm left lower lobe pulmonary nodules  Based on current Fleischner Society 2017 Guidelines on incidental pulmonary nodule, followup non-contrast CT is recommended, initially at 3-6 months from this examination and, if stable at that time, an additional followup is recommended for 18-24 months from this exam     Workstation performed: [id number]     Procedure: Ct Head Without Contrast    Result Date: 12/10/2017  Narrative: CT BRAIN - WITHOUT CONTRAST INDICATION:  Lethargy  Altered mental status  COMPARISON:  None  TECHNIQUE:  CT examination of the brain was performed  In addition to axial images, coronal reformatted images were created and submitted for interpretation  Radiation dose length product (DLP) for this visit:  969 mGy-cm   This examination, like all CT scans performed in the [facility], was performed utilizing techniques to minimize radiation dose exposure, including the use of iterative reconstruction and automated exposure control  IMAGE QUALITY:  Diagnostic   FINDINGS:  PARENCHYMA:  Decreased attenuation is noted in the supratentorial white matter demonstrating an appearance most consistent with moderate microangiopathic change  No intracranial mass, mass effect or midline shift  No CT signs of acute infarction  There is no parenchymal hemorrhage  Right frontal encephalomalacia is noted  Patient is status post remote aneurysm clipping  VENTRICLES AND EXTRA-AXIAL SPACES:  Enlargement of ventricles and extra-axial CSF spaces consistent with cerebral and cerebellar atrophy  VISUALIZED ORBITS AND PARANASAL SINUSES:  Unremarkable  CALVARIUM AND EXTRACRANIAL SOFT TISSUES:  Patient is status post right frontal craniotomy  Impression: No acute intracranial abnormality   Workstation performed: [id number]         Meds/Allergies   current meds:   Current Facility-Administered Medications   Medication Dose Route Frequency    acetaminophen (TYLENOL) tablet 650 mg  650 mg Oral Q6H PRN    albuterol inhalation solution 2 5 mg  2 5 mg Nebulization Q6H PRN    amLODIPine (NORVASC) tablet 10 mg  10 mg Oral Daily    aspirin (ECOTRIN LOW STRENGTH) EC tablet 81 mg  81 mg Oral Daily    budesonide-formoterol (SYMBICORT) 160-4 5 mcg/act inhaler 2 puff  2 puff Inhalation BID    buPROPion (WELLBUTRIN XL) 24 hr tablet 150 mg  150 mg Oral Daily    carBAMazepine (TEGretol) chewable tablet 100 mg  100 mg Oral TID    cefTRIAXone (ROCEPHIN) IVPB (premix) 1,000 mg  1,000 mg Intravenous Q24H    cholecalciferol (VITAMIN D3) tablet 2,000 Units  2,000 Units Oral Daily    cloNIDine (CATAPRES) tablet 0 2 mg  0 2 mg Oral HS    divalproex sodium (DEPAKOTE ER) 24 hr tablet 500 mg  500 mg Oral Daily    ferrous sulfate tablet 325 mg  325 mg Oral BID    heparin (porcine) subcutaneous injection 5,000 Units  5,000 Units Subcutaneous Q8H [facility]    ipratropium-albuterol (DUO-NEB) 0 5-2 5 mg/mL inhalation solution 3 mL  3 mL Nebulization TID    levothyroxine tablet 88 mcg  88 mcg Oral Early Morning    LORazepam (ATIVAN) tablet 0 5 mg  0 5 mg Oral BID    metoprolol (LOPRESSOR) injection 5 mg  5 mg Intravenous Q6H PRN    metoprolol succinate (TOPROL-XL) 24 hr tablet 50 mg  50 mg Oral QAM    ondansetron (ZOFRAN) injection 4 mg  4 mg Intravenous Q6H PRN    pravastatin (PRAVACHOL) tablet 40 mg  40 mg Oral Daily With Dinner    senna-docusate sodium (SENOKOT S) 8 6-50 mg per tablet 1 tablet  1 tablet Oral BID     Prescriptions Prior to Admission   Medication    acetaminophen (TYLENOL) 500 mg tablet    albuterol (2 5 mg/3 mL) 0 083 % nebulizer solution    albuterol (PROVENTIL HFA,VENTOLIN HFA) 90 mcg/act inhaler    amLODIPine (NORVASC) 10 mg tablet    aspirin (ECOTRIN LOW STRENGTH) 81 mg EC tablet    budesonide-formoterol (SYMBICORT) 160-4 5 mcg/act inhaler    buPROPion (WELLBUTRIN SR) 150 mg 12 hr tablet    carBAMazepine (TEGretol) 100 mg chewable tablet    Cholecalciferol (VITAMIN D) 2000 units CAPS    cloNIDine (CATAPRES) 0 1 mg tablet    divalproex sodium (DEPAKOTE ER) 250 mg 24 hr tablet    fenofibrate micronized (LOFIBRA) 67 MG capsule    ferrous sulfate 325 (65 Fe) mg tablet    ipratropium-albuterol (DUO-NEB) 0 5-2 5 mg/mL    Levothyroxine Sodium 88 MCG CAPS    LORazepam (ATIVAN) 0 5 mg tablet    metoprolol tartrate (LOPRESSOR) 50 mg tablet    Sennosides-Docusate Sodium (SENNA PLUS PO)    simvastatin (ZOCOR) 20 mg tablet            Assessment and Plan:    1  Elevated troponin likely NSTEMI type 2 from UTI with sepsis or it could also  be secondary to renal impairment       2  Cystitis - continue antibiotics  Management per [facility] / urology      3  Hypertension - BP stable  Continue all medications       4  CAD s/p PCI - Given patient's underlying dementia, plan is for medical management/conservative treatment  Stable  Echocardiogram with normal EF and no RWMA      5  Hyperlipidemia - continue with pravastatin  ** Please Note: Dictation voice to text software may have been used in the creation of this document   **

## 2017-12-12 NOTE — PROGRESS NOTES
[facility] Internal Medicine Progress Note  Patient: Yasmine Mcfarland 71 y o  female   MRN: [id number]  PCP: Alistair Pool DO  Unit/Bed#: -01 Encounter: [id number]  Date Of Visit: 12/12/17    Assessment:    Principal Problem:    Cystitis  Active Problems:    HTN (hypertension)    Elevated troponin    COPD (chronic obstructive pulmonary disease) (HCC)    CHF (congestive heart failure) ([facility])    TAM (acute kidney injury) ([facility] [address] )      Plan:    # Acute cystitis w/ chronic indwelling catheter; did not meet sirs criteria  - GNR urine; no growth per blood cx  - Cont IV ctx  - F/u identification and sensitivites  - Chronic baca d/t neurogenic bladder  S/p urology consult, no  surgical intervention indicated  Baca to remain in place  # NSTEMI type II d/t above, TAM  - Cardiology on board  - s/p echo, preserved EF and no regional wall motion abnl   - Hx of CAD s/p PCI; cont on medical management per cards  - Pt is cp free    # Dementia - MS at baseline; cont meds    # HTN - stable    # Hypernatremia - resolved; d/c IVF    # COPD - no sign of exacerbation    VTE Pharmacologic Prophylaxis:   Pharmacologic: Heparin  Mechanical VTE Prophylaxis in Place: Yes    Patient Centered Rounds: I have performed bedside rounds with nursing staff today  Discussions with Specialists or Other Care Team Provider:     Education and Discussions with Family / Patient: Called her dtr, left msg  Time Spent for Care: 30 minutes  More than 50% of total time spent on counseling and coordination of care as described above  Current Length of Stay: 2 day(s)    Current Patient Status: Inpatient   Certification Statement: The patient will continue to require additional inpatient hospital stay due to Pending urine cx idenfication and sensitivities    Discharge Plan / Estimated Discharge Date: D/c planning back to [facility] when able   Code Status: Level 3 - DNAR and DNI      Subjective:   Sitting in chair  No complaints       Objective: Vitals:   Temp (24hrs), Av 2 °F (36 8 °C), Min:97 9 °F (36 6 °C), Max:98 6 °F (37 °C)    HR:  [58-78] 65  Resp:  [17-18] 18  BP: (129-165)/(60-84) 148/67  SpO2:  [90 %-95 %] 95 %  Body mass index is 37 5 kg/m²  Input and Output Summary (last 24 hours): Intake/Output Summary (Last 24 hours) at 17 1259  Last data filed at 17 1100   Gross per 24 hour   Intake              880 ml   Output             1250 ml   Net             -370 ml       Physical Exam:     Physical Exam   Constitutional: She appears well-developed and well-nourished  Neck: Neck supple  Cardiovascular: Normal rate, regular rhythm, normal heart sounds and intact distal pulses  Exam reveals no gallop and no friction rub  No murmur heard  Pulmonary/Chest: Effort normal and breath sounds normal  No respiratory distress  She has no wheezes  She has no rales  She exhibits no tenderness  Abdominal: Soft  Bowel sounds are normal  She exhibits no distension and no mass  There is no tenderness  There is no rebound and no guarding  Genitourinary:   Genitourinary Comments: Chronic indwelling baca   Musculoskeletal: Normal range of motion  She exhibits no edema, tenderness or deformity  Neurological: She is alert  She displays normal reflexes  No cranial nerve deficit  She exhibits normal muscle tone  Coordination normal    AAO x 1; baseline dementia   Skin: Skin is warm and dry  No rash noted  No erythema  Psychiatric: She has a normal mood and affect   Her behavior is normal  Judgment and thought content normal        Additional Data:     Labs:      Results from last 7 days  Lab Units 17  0446   WBC Thousand/uL 5 50   HEMOGLOBIN g/dL 11 7   HEMATOCRIT % 35 9   PLATELETS Thousands/uL 133*   NEUTROS PCT % 47   LYMPHS PCT % 36   MONOS PCT % 10   EOS PCT % 6       Results from last 7 days  Lab Units 176  12/10/17  2123   SODIUM mmol/L 144  < > 147*   POTASSIUM mmol/L 4 3  < > 3 8   CHLORIDE mmol/L 109* < > 109*   CO2 mmol/L 28  < > 27   BUN mg/dL 21  < > 31*   CREATININE mg/dL 1 24  < > 1 73*   CALCIUM mg/dL 9 2  < > 9 8   TOTAL PROTEIN g/dL  --   --  7 6   BILIRUBIN TOTAL mg/dL  --   --  0 20   ALK PHOS U/L  --   --  55   ALT U/L  --   --  18   AST U/L  --   --  14   GLUCOSE RANDOM mg/dL 116  < > 166*   < > = values in this interval not displayed  Results from last 7 days  Lab Units 12/10/17  2123   INR  1 05       * I Have Reviewed All Lab Data Listed Above  * Additional Pertinent Lab Tests Reviewed: All Labs Within Last 24 Hours Reviewed    Imaging:    Imaging Reports Reviewed Today Include:   Imaging Personally Reviewed by Myself Includes:      Recent Cultures (last 7 days):       Results from last 7 days  Lab Units 12/10/17  2254 12/10/17  2130 12/10/17  2121   BLOOD CULTURE   --  No Growth at 24 hrs  No Growth at 24 hrs  URINE CULTURE  >100,000 cfu/ml Gram Negative Lukasz Enteric Like*  50,000-59,000 cfu/ml Non lactose fermenting gram negative lukasz*  --   --        Last 24 Hours Medication List:     amLODIPine 10 mg Oral Daily   aspirin 81 mg Oral Daily   budesonide-formoterol 2 puff Inhalation BID   buPROPion 150 mg Oral Daily   carBAMazepine 100 mg Oral TID   cefTRIAXone 1,000 mg Intravenous Q24H   cholecalciferol 2,000 Units Oral Daily   cloNIDine 0 2 mg Oral HS   divalproex sodium 500 mg Oral Daily   ferrous sulfate 325 mg Oral BID   heparin (porcine) 5,000 Units Subcutaneous Q8H [facility] & half-way   ipratropium-albuterol 3 mL Nebulization TID   levothyroxine 88 mcg Oral Early Morning   LORazepam 0 5 mg Oral BID   metoprolol succinate 50 mg Oral QAM   pravastatin 40 mg Oral Daily With Dinner   senna-docusate sodium 1 tablet Oral BID        Today, Patient Was Seen By: Chica Pearson DO    ** Please Note: This note has been constructed using a voice recognition system   **

## 2017-12-12 NOTE — PLAN OF CARE
Problem: DISCHARGE PLANNING - CARE MANAGEMENT  Goal: Discharge to post-acute care or home with appropriate resources  INTERVENTIONS:  - Conduct assessment to determine patient/family and health care team treatment goals, and need for post-acute services based on payer coverage, community resources, and patient preferences, and barriers to discharge  - Address psychosocial, clinical, and financial barriers to discharge as identified in assessment in conjunction with the patient/family and health care team  - Arrange appropriate level of post-acute services according to patient's   needs and preference and payer coverage in collaboration with the physician and health care team  - Communicate with and update the patient/family, physician, and health care team regarding progress on the discharge plan  - Arrange appropriate transportation to post-acute venues   Outcome: Progressing  Cm spoke to pt at bedside and to Helena Welch from [facility]  Helena Welch is aware of the periods of confusion that pt has and feels that this is at her baseline  Are willing to accept pt back to their facility  Will need to contact daughter to transport pt when medically cleared

## 2017-12-12 NOTE — SOCIAL WORK
Cm spoke to pt at bedside and to Danelle Goodwin from [facility]  Danelle Goodwin is aware of the periods of confusion that pt has and feels that this is at her baseline  Are willing to accept pt back to their facility  Will need to contact daughter to transport pt when medically cleared

## 2017-12-13 LAB
ANION GAP SERPL CALCULATED.3IONS-SCNC: 7 MMOL/L (ref 4–13)
BACTERIA UR CULT: ABNORMAL
BACTERIA UR CULT: ABNORMAL
BASOPHILS # BLD AUTO: 0.03 THOUSANDS/ΜL (ref 0–0.1)
BASOPHILS NFR BLD AUTO: 1 % (ref 0–1)
BUN SERPL-MCNC: 24 MG/DL (ref 5–25)
CALCIUM SERPL-MCNC: 8.9 MG/DL (ref 8.3–10.1)
CHLORIDE SERPL-SCNC: 111 MMOL/L (ref 100–108)
CO2 SERPL-SCNC: 26 MMOL/L (ref 21–32)
CREAT SERPL-MCNC: 1.21 MG/DL (ref 0.6–1.3)
EOSINOPHIL # BLD AUTO: 0.33 THOUSAND/ΜL (ref 0–0.61)
EOSINOPHIL NFR BLD AUTO: 6 % (ref 0–6)
ERYTHROCYTE [DISTWIDTH] IN BLOOD BY AUTOMATED COUNT: 11.9 % (ref 11.6–15.1)
GFR SERPL CREATININE-BSD FRML MDRD: 45 ML/MIN/1.73SQ M
GLUCOSE SERPL-MCNC: 102 MG/DL (ref 65–140)
HCT VFR BLD AUTO: 33.9 % (ref 34.8–46.1)
HGB BLD-MCNC: 11.2 G/DL (ref 11.5–15.4)
LYMPHOCYTES # BLD AUTO: 1.9 THOUSANDS/ΜL (ref 0.6–4.47)
LYMPHOCYTES NFR BLD AUTO: 36 % (ref 14–44)
MCH RBC QN AUTO: 33.4 PG (ref 26.8–34.3)
MCHC RBC AUTO-ENTMCNC: 33 G/DL (ref 31.4–37.4)
MCV RBC AUTO: 101 FL (ref 82–98)
MONOCYTES # BLD AUTO: 0.54 THOUSAND/ΜL (ref 0.17–1.22)
MONOCYTES NFR BLD AUTO: 10 % (ref 4–12)
NEUTROPHILS # BLD AUTO: 2.4 THOUSANDS/ΜL (ref 1.85–7.62)
NEUTS SEG NFR BLD AUTO: 46 % (ref 43–75)
NRBC BLD AUTO-RTO: 0 /100 WBCS
PLATELET # BLD AUTO: 137 THOUSANDS/UL (ref 149–390)
PMV BLD AUTO: 11.4 FL (ref 8.9–12.7)
POTASSIUM SERPL-SCNC: 4.1 MMOL/L (ref 3.5–5.3)
RBC # BLD AUTO: 3.35 MILLION/UL (ref 3.81–5.12)
SODIUM SERPL-SCNC: 144 MMOL/L (ref 136–145)
WBC # BLD AUTO: 5.25 THOUSAND/UL (ref 4.31–10.16)

## 2017-12-13 PROCEDURE — 85025 COMPLETE CBC W/AUTO DIFF WBC: CPT | Performed by: INTERNAL MEDICINE

## 2017-12-13 PROCEDURE — 94760 N-INVAS EAR/PLS OXIMETRY 1: CPT

## 2017-12-13 PROCEDURE — 94640 AIRWAY INHALATION TREATMENT: CPT

## 2017-12-13 PROCEDURE — 80048 BASIC METABOLIC PNL TOTAL CA: CPT | Performed by: INTERNAL MEDICINE

## 2017-12-13 RX ADMIN — CEFTRIAXONE 1000 MG: 1 INJECTION, SOLUTION INTRAVENOUS at 22:23

## 2017-12-13 RX ADMIN — HEPARIN SODIUM 5000 UNITS: 5000 INJECTION, SOLUTION INTRAVENOUS; SUBCUTANEOUS at 05:12

## 2017-12-13 RX ADMIN — IPRATROPIUM BROMIDE AND ALBUTEROL SULFATE 3 ML: .5; 3 SOLUTION RESPIRATORY (INHALATION) at 13:17

## 2017-12-13 RX ADMIN — ASPIRIN 81 MG: 81 TABLET, COATED ORAL at 09:30

## 2017-12-13 RX ADMIN — IPRATROPIUM BROMIDE AND ALBUTEROL SULFATE 3 ML: .5; 3 SOLUTION RESPIRATORY (INHALATION) at 07:31

## 2017-12-13 RX ADMIN — CARBAMAZEPINE 100 MG: 100 TABLET, CHEWABLE ORAL at 09:30

## 2017-12-13 RX ADMIN — LORAZEPAM 0.5 MG: 0.5 TABLET ORAL at 13:37

## 2017-12-13 RX ADMIN — LORAZEPAM 0.5 MG: 0.5 TABLET ORAL at 19:34

## 2017-12-13 RX ADMIN — LEVOTHYROXINE SODIUM 88 MCG: 88 TABLET ORAL at 05:12

## 2017-12-13 RX ADMIN — Medication 325 MG: at 09:30

## 2017-12-13 RX ADMIN — CARBAMAZEPINE 100 MG: 100 TABLET, CHEWABLE ORAL at 17:00

## 2017-12-13 RX ADMIN — HEPARIN SODIUM 5000 UNITS: 5000 INJECTION, SOLUTION INTRAVENOUS; SUBCUTANEOUS at 22:19

## 2017-12-13 RX ADMIN — BUPROPION HYDROCHLORIDE 150 MG: 150 TABLET, FILM COATED, EXTENDED RELEASE ORAL at 09:27

## 2017-12-13 RX ADMIN — CARBAMAZEPINE 100 MG: 100 TABLET, CHEWABLE ORAL at 22:00

## 2017-12-13 RX ADMIN — Medication 1 TABLET: at 18:00

## 2017-12-13 RX ADMIN — METOPROLOL SUCCINATE 50 MG: 25 TABLET, FILM COATED, EXTENDED RELEASE ORAL at 09:27

## 2017-12-13 RX ADMIN — DIVALPROEX SODIUM 500 MG: 250 TABLET, FILM COATED, EXTENDED RELEASE ORAL at 09:26

## 2017-12-13 RX ADMIN — CLONIDINE HYDROCHLORIDE 0.2 MG: 0.1 TABLET ORAL at 22:18

## 2017-12-13 RX ADMIN — PRAVASTATIN SODIUM 40 MG: 40 TABLET ORAL at 17:00

## 2017-12-13 RX ADMIN — AMLODIPINE BESYLATE 10 MG: 10 TABLET ORAL at 09:30

## 2017-12-13 RX ADMIN — BUDESONIDE AND FORMOTEROL FUMARATE DIHYDRATE 2 PUFF: 160; 4.5 AEROSOL RESPIRATORY (INHALATION) at 18:00

## 2017-12-13 RX ADMIN — Medication 1 TABLET: at 09:30

## 2017-12-13 RX ADMIN — Medication 325 MG: at 18:00

## 2017-12-13 RX ADMIN — BUDESONIDE AND FORMOTEROL FUMARATE DIHYDRATE 2 PUFF: 160; 4.5 AEROSOL RESPIRATORY (INHALATION) at 09:33

## 2017-12-13 RX ADMIN — HEPARIN SODIUM 5000 UNITS: 5000 INJECTION, SOLUTION INTRAVENOUS; SUBCUTANEOUS at 13:37

## 2017-12-13 RX ADMIN — VITAMIN D, TAB 1000IU (100/BT) 2000 UNITS: 25 TAB at 09:30

## 2017-12-13 RX ADMIN — IPRATROPIUM BROMIDE AND ALBUTEROL SULFATE 3 ML: .5; 3 SOLUTION RESPIRATORY (INHALATION) at 18:15

## 2017-12-13 NOTE — PLAN OF CARE
CARDIOVASCULAR - ADULT     Maintains optimal cardiac output and hemodynamic stability Progressing     Absence of cardiac dysrhythmias or at baseline rhythm Progressing        DISCHARGE PLANNING     Discharge to home or other facility with appropriate resources Progressing        DISCHARGE PLANNING - CARE MANAGEMENT     Discharge to post-acute care or home with appropriate resources Progressing        GENITOURINARY - ADULT     Maintains or returns to baseline urinary function Progressing     Absence of urinary retention Progressing     Urinary catheter remains patent Progressing        INFECTION - ADULT     Absence or prevention of progression during hospitalization Progressing        Knowledge Deficit     Patient/family/caregiver demonstrates understanding of disease process, treatment plan, medications, and discharge instructions Progressing        NEUROSENSORY - ADULT     Absence of seizures Progressing     Remains free of injury related to seizures activity Progressing        PAIN - ADULT     Verbalizes/displays adequate comfort level or baseline comfort level Progressing        Potential for Falls     Patient will remain free of falls Progressing        Prexisting or High Potential for Compromised Skin Integrity     Skin integrity is maintained or improved Progressing        SAFETY ADULT     Maintain or return to baseline ADL function Progressing     Maintain or return mobility status to optimal level Progressing

## 2017-12-13 NOTE — RESPIRATORY THERAPY NOTE
Pt stated she feels very good this morning  No distress, tx given  Pt is on room air    Will continue with current plan

## 2017-12-13 NOTE — PROGRESS NOTES
Progress Note - Cardiology     Sarahy Aguillon 71 y o  female MRN: [id number]  Unit/Bed#: -01 Encounter: [id number]      Subjective:   Patient states she is feeling better  Less SOB  Patient denies chest pain, palpitations, lightheadedness  Objective:   Vitals:  Vitals:    17 0926   BP: 152/84   Pulse: 68   Resp:    Temp:    SpO2:        Body mass index is 37 5 kg/m²  Systolic (30DDT), IBZ:690 , Min:109 , TO     Diastolic (98WEU), VKI:70, Min:53, Max:84      Intake/Output Summary (Last 24 hours) at 17 1027  Last data filed at 17 0800   Gross per 24 hour   Intake              860 ml   Output              900 ml   Net              -40 ml     Weight (last 2 days)     None          PHYSICAL EXAM:  General: Patient is not in acute distress  Laying comfortably in the bed  Awake, alert, responding to commands  Head: Normocephalic  Atraumatic  HEENT: Both pupils normal sized, round and reactive to light  Nonicteric  Neck: JVP not raised  Trachea central  No thyromegaly  Respiratory: Bilateral bronchovascular breath sounds with no crackles or rhonchi  Cardiovascular: RRR  S1 and S2 normal  No murmur, rub or gallop  GI: Abdomen soft, nontender  No guarding or rigidity  Liver and spleen not palpable  Bowel sounds present  Neurologic: Patient is awake, alert, responding to command   Moving all extremities  Integumentary:  No skin rash  Lymphatic: No cervical lymphadenopathy  Extremities: No clubbing, cyanosis or edema      LABORATORY RESULTS:    Results from last 7 days  Lab Units 17  0320 17  0104 12/10/17  2123   TROPONIN I ng/mL 0 11* 0 11* 0 08*     CBC with diff:   Results from last 7 days  Lab Units 17  0510 17  0446 17  0326  12/10/17  2123   WBC Thousand/uL 5 25 5 50 6 53  --  8 30   HEMOGLOBIN g/dL 11 2* 11 7 11 7  --  12 9   HEMATOCRIT % 33 9* 35 9 35 7  --  39 6   MCV fL 101* 102* 101*  --  102*   PLATELETS Thousands/uL 137* 133* 134*  < > 153   MCH pg 33 4 33 2 33 2  --  33 1   MCHC g/dL 33 0 32 6 32 8  --  32 6   RDW % 11 9 11 9 12 2  --  12 2   MPV fL 11 4 10 9 11 0  < > 11 7   NRBC AUTO /100 WBCs 0 0  --   --  0   < > = values in this interval not displayed      CMP:  Results from last 7 days  Lab Units 12/13/17  0510 12/12/17  0446 12/11/17 0326 12/10/17  2123   SODIUM mmol/L 144 144 147* 147*   POTASSIUM mmol/L 4 1 4 3 3 7 3 8   CHLORIDE mmol/L 111* 109* 111* 109*   CO2 mmol/L 26 28 26 27   ANION GAP mmol/L 7 7 10 11   BUN mg/dL 24 21 29* 31*   CREATININE mg/dL 1 21 1 24 1 42* 1 73*   GLUCOSE RANDOM mg/dL 102 116 112 166*   CALCIUM mg/dL 8 9 9 2 9 1 9 8   AST U/L  --   --   --  14   ALT U/L  --   --   --  18   ALK PHOS U/L  --   --   --  55   TOTAL PROTEIN g/dL  --   --   --  7 6   ALBUMIN g/dL  --   --   --  3 6   BILIRUBIN TOTAL mg/dL  --   --   --  0 20   EGFR ml/min/1 73sq m 45 44 37 29       BMP:  Results from last 7 days  Lab Units 12/13/17 0510 12/12/17 0446 12/11/17 0326   SODIUM mmol/L 144 144 147*   POTASSIUM mmol/L 4 1 4 3 3 7   CHLORIDE mmol/L 111* 109* 111*   CO2 mmol/L 26 28 26   BUN mg/dL 24 21 29*   CREATININE mg/dL 1 21 1 24 1 42*   GLUCOSE RANDOM mg/dL 102 116 112   CALCIUM mg/dL 8 9 9 2 9 1         Results from last 7 days  Lab Units 12/10/17  2123   NT-PRO BNP pg/mL 958*          Results from last 7 days  Lab Units 12/10/17  2123   MAGNESIUM mg/dL 2 0                     Results from last 7 days  Lab Units 12/10/17  2123   INR  1 05       Lipid Profile:   No results found for: CHOL  No results found for: HDL  No results found for: LDLCALC  No results found for: TRIG    Cardiac testing:   Results for orders placed during the hospital encounter of 12/10/17   Echo complete with contrast if indicated    Narrative Donald [address]  [address] [facility],[address] [place], [place] [address] [phone]    Transthoracic Echocardiogram  2D, M-mode, Doppler, and Color Doppler    Study date:  11-Dec-2017    Patient: Pedro LI number: [id number]  Account number: [de-identified]  : 1945  Age: 71 years  Gender: Female  Status: Inpatient  Location: Bedside  Height: 60 in  Weight: 192 lb  BP: 99/ 50 mmHg    Indications: Shortness of breath  Diagnoses: R06 00 - Dyspnea, unspecified, R06 02 - Shortness of breath    Sonographer:  Eileen Polo  Interpreting Physician:  Fernando Welch MD  Referring Physician:  Kandis Wilson PA-C  Group:  [facility]    SUMMARY    LEFT VENTRICLE:  Systolic function was normal  Ejection fraction was estimated in the range of 55 % to 65 %  There were no regional wall motion abnormalities  There was mild concentric hypertrophy  Doppler parameters were consistent with abnormal left ventricular relaxation (grade 1 diastolic dysfunction)  MITRAL VALVE:  There was mild annular calcification  There was mild regurgitation  AORTIC VALVE:  There was mild regurgitation  TRICUSPID VALVE:  There was mild regurgitation  PULMONIC VALVE:  There was mild regurgitation  HISTORY: PRIOR HISTORY: Elevated trponin, Dementia, Seizures, Stroke, Myocardial infarction  Congestive heart failure  Risk factors: hypertension and hypercholesterolemia  Chronic lung disease  PROCEDURE: The procedure was performed at the bedside  This was a routine study  The transthoracic approach was used  The study included complete 2D imaging, M-mode, complete spectral Doppler, and color Doppler  The heart rate was 68 bpm,  at the start of the study  Images were obtained from the parasternal, apical, subcostal, and suprasternal notch acoustic windows  Echocardiographic views were limited due to poor acoustic window availability, decreased penetration, and  lung interference  This was a technically difficult study  LEFT VENTRICLE: Size was normal  Systolic function was normal  Ejection fraction was estimated in the range of 55 % to 65 %  There were no regional wall motion abnormalities   Victorina Whitmore thickness was normal  There was mild concentric  hypertrophy  DOPPLER: Doppler parameters were consistent with abnormal left ventricular relaxation (grade 1 diastolic dysfunction)  RIGHT VENTRICLE: The size was normal  Systolic function was normal  Wall thickness was normal     LEFT ATRIUM: Size was normal     RIGHT ATRIUM: Size was normal     MITRAL VALVE: There was mild annular calcification  Valve structure was normal  There was normal leaflet separation  DOPPLER: The transmitral velocity was within the normal range  There was no evidence for stenosis  There was mild  regurgitation  AORTIC VALVE: The valve was trileaflet  Leaflets exhibited normal thickness and normal cuspal separation  DOPPLER: Transaortic velocity was within the normal range  There was no evidence for stenosis  There was mild regurgitation  TRICUSPID VALVE: The valve structure was normal  There was normal leaflet separation  DOPPLER: The transtricuspid velocity was within the normal range  There was no evidence for stenosis  There was mild regurgitation  PULMONIC VALVE: Leaflets exhibited normal thickness, no calcification, and normal cuspal separation  DOPPLER: The transpulmonic velocity was within the normal range  There was mild regurgitation  PERICARDIUM: There was no pericardial effusion  The pericardium was normal in appearance  AORTA: The root exhibited normal size      SYSTEM MEASUREMENT TABLES    2D  %FS: 29 2 %  Ao Diam: 3 1 cm  EDV(Teich): 140 8 ml  EF(Teich): 55 6 %  ESV(Teich): 62 5 ml  IVSd: 1 1 cm  LA Area: 20 9 cm2  LA Diam: 3 7 cm  LVEDV MOD A4C: 117 1 ml  LVEF MOD A4C: 50 3 %  LVESV MOD A4C: 58 2 ml  LVIDd: 5 4 cm  LVIDs: 3 8 cm  LVLd A4C: 8 5 cm  LVLs A4C: 7 1 cm  LVPWd: 1 1 cm  RA Area: 17 5 cm2  RVIDd: 3 2 cm  SV MOD A4C: 59 ml  SV(Teich): 78 3 ml    CW  AR Dec [place]: 1 9 m/s2  AR Dec Time: 2208 1 ms  AR PHT: 640 4 ms  AR Vmax: 4 1 m/s  AR maxP 4 mmHg  TR Vmax: 2 5 m/s  TR maxP mmHg    MM  TAPSE: 1 4 cm    PW  AVC: 389 7 ms  E': 0 1 m/s  E/E': 15 3  MV A Anoop: 1 m/s  MV Dec Edwards: 3 4 m/s2  MV DecT: 240 3 ms  MV E Anoop: 0 8 m/s  MV E/A Ratio: 0 8  MV PHT: 69 7 ms  MVA By PHT: 3 2 cm2    [employer] Accredited Echocardiography Laboratory    Prepared and electronically signed by    Inocencia Acosta MD  Signed 11-Dec-2017 12:09:00       No results found for this or any previous visit  No results found for this or any previous visit  No procedure found  No results found for this or any previous visit  Imaging: I have personally reviewed pertinent reports  Procedure: Ct Chest Abdomen Pelvis Wo Contrast    Result Date: 12/10/2017  Narrative: CT CHEST, ABDOMEN AND PELVIS WITHOUT IV CONTRAST INDICATION:  Weakness  COMPARISON: None  TECHNIQUE: CT examination of the chest, abdomen and pelvis was performed without intravenous contrast   Reformatted images were created in axial, sagittal, and coronal planes  Radiation dose length product (DLP) for this visit:  654 mGy-cm   This examination, like all CT scans performed in the [facility], was performed utilizing techniques to minimize radiation dose exposure, including the use of iterative reconstruction and automated exposure control  Enteric contrast was administered  FINDINGS: CHEST LUNGS:  7 mm left lower lobe pulmonary nodule is noted series 3 image 46   7 mm left lower lobe pulmonary nodule is identified best seen series 602 image 101  PLEURA:  Unremarkable  HEART/GREAT VESSELS:  Extensive coronary artery calcification is present  MEDIASTINUM AND ANNE:  Unremarkable  CHEST WALL AND LOWER NECK:       Normal  ABDOMEN LIVER/BILIARY TREE:  Unremarkable  GALLBLADDER:  Cholelithiasis is present  SPLEEN:  Unremarkable  PANCREAS:  Unremarkable  ADRENAL GLANDS:  Unremarkable  KIDNEYS/URETERS:  Right kidney is absent  Left kidney is unremarkable  STOMACH AND BOWEL:  Unremarkable  APPENDIX:  No findings to suggest appendicitis  ABDOMINOPELVIC CAVITY:  No ascites or free intraperitoneal air  No lymphadenopathy  VESSELS:  Unremarkable for patient's age  PELVIS REPRODUCTIVE ORGANS:  Davis catheter is identified  Bladder remains distended  Periventricular inflammatory change suggests cystitis  URINARY BLADDER:  Unremarkable  ABDOMINAL WALL/INGUINAL REGIONS:  Fat-containing periumbilical hernia is identified  OSSEOUS STRUCTURES:  No acute fracture or destructive osseous lesion  Impression: Bladder distention despite presence of Davis catheter  Perivesicular inflammatory change suggesting cystitis  Fat-containing ventral hernia  7 mm left lower lobe pulmonary nodules  Based on current Fleischner Society 2017 Guidelines on incidental pulmonary nodule, followup non-contrast CT is recommended, initially at 3-6 months from this examination and, if stable at that time, an additional followup is recommended for 18-24 months from this exam     Workstation performed: [id number]     Procedure: Ct Head Without Contrast    Result Date: 12/10/2017  Narrative: CT BRAIN - WITHOUT CONTRAST INDICATION:  Lethargy  Altered mental status  COMPARISON:  None  TECHNIQUE:  CT examination of the brain was performed  In addition to axial images, coronal reformatted images were created and submitted for interpretation  Radiation dose length product (DLP) for this visit:  969 mGy-cm   This examination, like all CT scans performed in the [facility], was performed utilizing techniques to minimize radiation dose exposure, including the use of iterative reconstruction and automated exposure control  IMAGE QUALITY:  Diagnostic  FINDINGS:  PARENCHYMA:  Decreased attenuation is noted in the supratentorial white matter demonstrating an appearance most consistent with moderate microangiopathic change  No intracranial mass, mass effect or midline shift  No CT signs of acute infarction  There is no parenchymal hemorrhage    Right frontal encephalomalacia is noted  Patient is status post remote aneurysm clipping  VENTRICLES AND EXTRA-AXIAL SPACES:  Enlargement of ventricles and extra-axial CSF spaces consistent with cerebral and cerebellar atrophy  VISUALIZED ORBITS AND PARANASAL SINUSES:  Unremarkable  CALVARIUM AND EXTRACRANIAL SOFT TISSUES:  Patient is status post right frontal craniotomy  Impression: No acute intracranial abnormality   Workstation performed: [id number]         Meds/Allergies   current meds:   Current Facility-Administered Medications   Medication Dose Route Frequency    acetaminophen (TYLENOL) tablet 650 mg  650 mg Oral Q6H PRN    albuterol inhalation solution 2 5 mg  2 5 mg Nebulization Q4H PRN    amLODIPine (NORVASC) tablet 10 mg  10 mg Oral Daily    aspirin (ECOTRIN LOW STRENGTH) EC tablet 81 mg  81 mg Oral Daily    budesonide-formoterol (SYMBICORT) 160-4 5 mcg/act inhaler 2 puff  2 puff Inhalation BID    buPROPion (WELLBUTRIN XL) 24 hr tablet 150 mg  150 mg Oral Daily    carBAMazepine (TEGretol) chewable tablet 100 mg  100 mg Oral TID    cefTRIAXone (ROCEPHIN) IVPB (premix) 1,000 mg  1,000 mg Intravenous Q24H    cholecalciferol (VITAMIN D3) tablet 2,000 Units  2,000 Units Oral Daily    cloNIDine (CATAPRES) tablet 0 2 mg  0 2 mg Oral HS    divalproex sodium (DEPAKOTE ER) 24 hr tablet 500 mg  500 mg Oral Daily    ferrous sulfate tablet 325 mg  325 mg Oral BID    heparin (porcine) subcutaneous injection 5,000 Units  5,000 Units Subcutaneous Q8H [address]    ipratropium-albuterol (DUO-NEB) 0 5-2 5 mg/mL inhalation solution 3 mL  3 mL Nebulization TID    levothyroxine tablet 88 mcg  88 mcg Oral Early Morning    LORazepam (ATIVAN) tablet 0 5 mg  0 5 mg Oral BID    metoprolol (LOPRESSOR) injection 5 mg  5 mg Intravenous Q6H PRN    metoprolol succinate (TOPROL-XL) 24 hr tablet 50 mg  50 mg Oral QAM    ondansetron (ZOFRAN) injection 4 mg  4 mg Intravenous Q6H PRN    pravastatin (PRAVACHOL) tablet 40 mg  40 mg Oral Daily With Dinner    senna-docusate sodium (SENOKOT S) 8 6-50 mg per tablet 1 tablet  1 tablet Oral BID     Prescriptions Prior to Admission   Medication    acetaminophen (TYLENOL) 500 mg tablet    albuterol (2 5 mg/3 mL) 0 083 % nebulizer solution    albuterol (PROVENTIL HFA,VENTOLIN HFA) 90 mcg/act inhaler    amLODIPine (NORVASC) 10 mg tablet    aspirin (ECOTRIN LOW STRENGTH) 81 mg EC tablet    budesonide-formoterol (SYMBICORT) 160-4 5 mcg/act inhaler    buPROPion (WELLBUTRIN SR) 150 mg 12 hr tablet    carBAMazepine (TEGretol) 100 mg chewable tablet    Cholecalciferol (VITAMIN D) 2000 units CAPS    cloNIDine (CATAPRES) 0 1 mg tablet    divalproex sodium (DEPAKOTE ER) 250 mg 24 hr tablet    fenofibrate micronized (LOFIBRA) 67 MG capsule    ferrous sulfate 325 (65 Fe) mg tablet    ipratropium-albuterol (DUO-NEB) 0 5-2 5 mg/mL    Levothyroxine Sodium 88 MCG CAPS    LORazepam (ATIVAN) 0 5 mg tablet    metoprolol tartrate (LOPRESSOR) 50 mg tablet    Sennosides-Docusate Sodium (SENNA PLUS PO)    simvastatin (ZOCOR) 20 mg tablet            Assessment and Plan:     1   Elevated troponin likely NSTEMI type 2 from UTI with sepsis or it could also  be secondary to renal impairment       2   Cystitis - continue antibiotics   Management per SLIM / urology      3   Hypertension - BP stable   Continue all medications       4   CAD s/p PCI - Given patient's underlying dementia, plan is for medical management/conservative treatment  Stable  Echocardiogram with normal EF and no RWMA  Continue aspirin, beta-blocker, statin     5   Hyperlipidemia - continue with pravastatin  ** Please Note: Dictation voice to text software may have been used in the creation of this document   **

## 2017-12-13 NOTE — SOCIAL WORK
Cm spoke to pt and daughter at bedside  Pt to be discharged back to [facility] tomorrow with daughter transporting  IMM reviewed with pt  Questions answered  Copy to pt and copy to MR for scanning  The discharge will be at 1pm per Dr Brando Maier

## 2017-12-13 NOTE — PLAN OF CARE
Problem: DISCHARGE PLANNING - CARE MANAGEMENT  Goal: Discharge to post-acute care or home with appropriate resources  INTERVENTIONS:  - Conduct assessment to determine patient/family and health care team treatment goals, and need for post-acute services based on payer coverage, community resources, and patient preferences, and barriers to discharge  - Address psychosocial, clinical, and financial barriers to discharge as identified in assessment in conjunction with the patient/family and health care team  - Arrange appropriate level of post-acute services according to patient's   needs and preference and payer coverage in collaboration with the physician and health care team  - Communicate with and update the patient/family, physician, and health care team regarding progress on the discharge plan  - Arrange appropriate transportation to post-acute venues   Outcome: Progressing  Cm spoke to pt and daughter at bedside  Pt to be discharged back to [facility] tomorrow with daughter transporting  IMM reviewed with pt  Questions answered  Copy to pt and copy to MR for scanning  The discharge will be at 1pm per Dr Kandy Winston

## 2017-12-13 NOTE — PROGRESS NOTES
[facility] Internal Medicine Progress Note  Patient: Olamide Almazan 71 y o  female   MRN: [id number]  PCP: Kindra Diaz DO  Unit/Bed#: MS Gerardo-01 Encounter: [id number]  Date Of Visit: 12/13/17    Assessment:    Principal Problem:    Cystitis  Active Problems:    HTN (hypertension)    Elevated troponin    COPD (chronic obstructive pulmonary disease) (HCC)    CHF (congestive heart failure) (HCC)    TAM (acute kidney injury) ([facility] [address] )      Plan:    # Acute cystitis w/ chronic indwelling catheter; did not meet sirs criteria  - > 100K citrobacter, sens to ctx no growth per blood cx  - Cont IV ctx  - Chronic baca d/t neurogenic bladder  S/p urology consult, no  surgical intervention indicated  Baca to remain in place  # NSTEMI type II d/t above, TAM  - Cardiology on board  - s/p echo, preserved EF and no regional wall motion abnl   - Hx of CAD s/p PCI; cont on medical management per cards  - Pt is cp free    # Dementia - MS at baseline; cont meds    # HTN - stable    # Hypernatremia - resolved    # COPD - no sign of exacerbation    VTE Pharmacologic Prophylaxis:   Pharmacologic: Heparin  Mechanical VTE Prophylaxis in Place: Yes    Patient Centered Rounds: I have performed bedside rounds with nursing staff today  Discussions with Specialists or Other Care Team Provider:     Education and Discussions with Family / Patient: Called her dtr again today, no answer thus left msg    Time Spent for Care: 30 minutes  More than 50% of total time spent on counseling and coordination of care as described above  Current Length of Stay: 3 day(s)    Current Patient Status: Inpatient   Certification Statement: The patient will continue to require additional inpatient hospital stay due to clinical condition    Discharge Plan / Estimated Discharge Date: D/c planning back to [facility] hopfully tomorrow once able to reach dtr  Code Status: Level 3 - DNAR and DNI      Subjective:   Sitting in chair  No complaints   No acute events overnight  Denies cardiac symptoms    Objective:     Vitals:   Temp (24hrs), Av 2 °F (36 8 °C), Min:98 °F (36 7 °C), Max:98 4 °F (36 9 °C)    HR:  [58-71] 71  Resp:  [18] 18  BP: (109-162)/(53-84) 153/67  SpO2:  [91 %-98 %] 98 %  Body mass index is 37 5 kg/m²  Input and Output Summary (last 24 hours): Intake/Output Summary (Last 24 hours) at 17 1418  Last data filed at 17 0800   Gross per 24 hour   Intake              680 ml   Output              650 ml   Net               30 ml       Physical Exam:     Physical Exam   Constitutional: She appears well-developed and well-nourished  Neck: Neck supple  Cardiovascular: Normal rate, regular rhythm, normal heart sounds and intact distal pulses  Exam reveals no gallop and no friction rub  No murmur heard  Pulmonary/Chest: Effort normal and breath sounds normal  No respiratory distress  She has no wheezes  She has no rales  She exhibits no tenderness  Abdominal: Soft  Bowel sounds are normal  She exhibits no distension and no mass  There is no tenderness  There is no rebound and no guarding  Genitourinary:   Genitourinary Comments: Chronic indwelling baca   Musculoskeletal: Normal range of motion  She exhibits no edema, tenderness or deformity  Neurological: She is alert  She displays normal reflexes  No cranial nerve deficit  She exhibits normal muscle tone  Coordination normal    AAO x 1; baseline dementia   Skin: Skin is warm and dry  No rash noted  No erythema  Psychiatric: She has a normal mood and affect   Her behavior is normal  Judgment and thought content normal        Additional Data:     Labs:      Results from last 7 days  Lab Units 17  0510   WBC Thousand/uL 5 25   HEMOGLOBIN g/dL 11 2*   HEMATOCRIT % 33 9*   PLATELETS Thousands/uL 137*   NEUTROS PCT % 46   LYMPHS PCT % 36   MONOS PCT % 10   EOS PCT % 6       Results from last 7 days  Lab Units 17  0510  12/10/17  2123   SODIUM mmol/L 144  < > 147* POTASSIUM mmol/L 4 1  < > 3 8   CHLORIDE mmol/L 111*  < > 109*   CO2 mmol/L 26  < > 27   BUN mg/dL 24  < > 31*   CREATININE mg/dL 1 21  < > 1 73*   CALCIUM mg/dL 8 9  < > 9 8   TOTAL PROTEIN g/dL  --   --  7 6   BILIRUBIN TOTAL mg/dL  --   --  0 20   ALK PHOS U/L  --   --  55   ALT U/L  --   --  18   AST U/L  --   --  14   GLUCOSE RANDOM mg/dL 102  < > 166*   < > = values in this interval not displayed  Results from last 7 days  Lab Units 12/10/17  2123   INR  1 05       * I Have Reviewed All Lab Data Listed Above  * Additional Pertinent Lab Tests Reviewed: All Labs Within Last 24 Hours Reviewed    Imaging:    Imaging Reports Reviewed Today Include:   Imaging Personally Reviewed by Myself Includes:      Recent Cultures (last 7 days):       Results from last 7 days  Lab Units 12/10/17  2254 12/10/17  2130 12/10/17  2121   BLOOD CULTURE   --  No Growth at 48 hrs  No Growth at 48 hrs  URINE CULTURE  >100,000 cfu/ml Citrobacter freundii*  50,000-59,000 cfu/ml Providencia rettgeri*  --   --        Last 24 Hours Medication List:     amLODIPine 10 mg Oral Daily   aspirin 81 mg Oral Daily   budesonide-formoterol 2 puff Inhalation BID   buPROPion 150 mg Oral Daily   carBAMazepine 100 mg Oral TID   cefTRIAXone 1,000 mg Intravenous Q24H   cholecalciferol 2,000 Units Oral Daily   cloNIDine 0 2 mg Oral HS   divalproex sodium 500 mg Oral Daily   ferrous sulfate 325 mg Oral BID   heparin (porcine) 5,000 Units Subcutaneous Q8H [address]   ipratropium-albuterol 3 mL Nebulization TID   levothyroxine 88 mcg Oral Early Morning   LORazepam 0 5 mg Oral BID   metoprolol succinate 50 mg Oral QAM   pravastatin 40 mg Oral Daily With Dinner   senna-docusate sodium 1 tablet Oral BID        Today, Patient Was Seen By: Mariza Julian DO    ** Please Note: This note has been constructed using a voice recognition system   **

## 2017-12-14 VITALS
RESPIRATION RATE: 18 BRPM | SYSTOLIC BLOOD PRESSURE: 170 MMHG | DIASTOLIC BLOOD PRESSURE: 77 MMHG | BODY MASS INDEX: 37.7 KG/M2 | TEMPERATURE: 98.1 F | WEIGHT: 192.02 LBS | OXYGEN SATURATION: 91 % | HEIGHT: 60 IN | HEART RATE: 60 BPM

## 2017-12-14 PROBLEM — N30.90 CYSTITIS: Status: RESOLVED | Noted: 2017-12-10 | Resolved: 2017-12-14

## 2017-12-14 PROBLEM — R77.8 ELEVATED TROPONIN: Status: RESOLVED | Noted: 2017-12-10 | Resolved: 2017-12-14

## 2017-12-14 PROBLEM — N17.9 AKI (ACUTE KIDNEY INJURY) (HCC): Status: RESOLVED | Noted: 2017-12-10 | Resolved: 2017-12-14

## 2017-12-14 LAB
ANION GAP SERPL CALCULATED.3IONS-SCNC: 10 MMOL/L (ref 4–13)
BASOPHILS # BLD AUTO: 0.02 THOUSANDS/ΜL (ref 0–0.1)
BASOPHILS NFR BLD AUTO: 0 % (ref 0–1)
BUN SERPL-MCNC: 27 MG/DL (ref 5–25)
CALCIUM SERPL-MCNC: 9.4 MG/DL (ref 8.3–10.1)
CHLORIDE SERPL-SCNC: 111 MMOL/L (ref 100–108)
CO2 SERPL-SCNC: 26 MMOL/L (ref 21–32)
CREAT SERPL-MCNC: 1.26 MG/DL (ref 0.6–1.3)
EOSINOPHIL # BLD AUTO: 0.35 THOUSAND/ΜL (ref 0–0.61)
EOSINOPHIL NFR BLD AUTO: 7 % (ref 0–6)
ERYTHROCYTE [DISTWIDTH] IN BLOOD BY AUTOMATED COUNT: 11.9 % (ref 11.6–15.1)
GFR SERPL CREATININE-BSD FRML MDRD: 43 ML/MIN/1.73SQ M
GLUCOSE SERPL-MCNC: 113 MG/DL (ref 65–140)
HCT VFR BLD AUTO: 36.3 % (ref 34.8–46.1)
HGB BLD-MCNC: 11.8 G/DL (ref 11.5–15.4)
LYMPHOCYTES # BLD AUTO: 1.68 THOUSANDS/ΜL (ref 0.6–4.47)
LYMPHOCYTES NFR BLD AUTO: 33 % (ref 14–44)
MCH RBC QN AUTO: 33 PG (ref 26.8–34.3)
MCHC RBC AUTO-ENTMCNC: 32.5 G/DL (ref 31.4–37.4)
MCV RBC AUTO: 101 FL (ref 82–98)
MONOCYTES # BLD AUTO: 0.5 THOUSAND/ΜL (ref 0.17–1.22)
MONOCYTES NFR BLD AUTO: 10 % (ref 4–12)
NEUTROPHILS # BLD AUTO: 2.44 THOUSANDS/ΜL (ref 1.85–7.62)
NEUTS SEG NFR BLD AUTO: 48 % (ref 43–75)
NRBC BLD AUTO-RTO: 0 /100 WBCS
PLATELET # BLD AUTO: 146 THOUSANDS/UL (ref 149–390)
PMV BLD AUTO: 11.1 FL (ref 8.9–12.7)
POTASSIUM SERPL-SCNC: 4 MMOL/L (ref 3.5–5.3)
RBC # BLD AUTO: 3.58 MILLION/UL (ref 3.81–5.12)
SODIUM SERPL-SCNC: 147 MMOL/L (ref 136–145)
WBC # BLD AUTO: 5.05 THOUSAND/UL (ref 4.31–10.16)

## 2017-12-14 PROCEDURE — 85025 COMPLETE CBC W/AUTO DIFF WBC: CPT | Performed by: INTERNAL MEDICINE

## 2017-12-14 PROCEDURE — 94640 AIRWAY INHALATION TREATMENT: CPT

## 2017-12-14 PROCEDURE — 80048 BASIC METABOLIC PNL TOTAL CA: CPT | Performed by: INTERNAL MEDICINE

## 2017-12-14 PROCEDURE — 94760 N-INVAS EAR/PLS OXIMETRY 1: CPT

## 2017-12-14 RX ORDER — METOPROLOL SUCCINATE 50 MG/1
50 TABLET, EXTENDED RELEASE ORAL EVERY MORNING
Qty: 30 TABLET | Refills: 1 | Status: SHIPPED | OUTPATIENT
Start: 2017-12-15 | End: 2018-03-30 | Stop reason: HOSPADM

## 2017-12-14 RX ORDER — LEVALBUTEROL 1.25 MG/.5ML
1.25 SOLUTION, CONCENTRATE RESPIRATORY (INHALATION)
Status: DISCONTINUED | OUTPATIENT
Start: 2017-12-14 | End: 2017-12-14 | Stop reason: HOSPADM

## 2017-12-14 RX ORDER — CIPROFLOXACIN 250 MG/1
250 TABLET, FILM COATED ORAL 2 TIMES DAILY
Qty: 5 TABLET | Refills: 0 | Status: SHIPPED | OUTPATIENT
Start: 2017-12-14 | End: 2017-12-17

## 2017-12-14 RX ORDER — LEVALBUTEROL 1.25 MG/.5ML
SOLUTION, CONCENTRATE RESPIRATORY (INHALATION)
Status: DISCONTINUED
Start: 2017-12-14 | End: 2017-12-14 | Stop reason: HOSPADM

## 2017-12-14 RX ORDER — SACCHAROMYCES BOULARDII 250 MG
250 CAPSULE ORAL 2 TIMES DAILY
Qty: 14 CAPSULE | Refills: 0 | Status: SHIPPED | OUTPATIENT
Start: 2017-12-14 | End: 2017-12-21

## 2017-12-14 RX ORDER — CIPROFLOXACIN 250 MG/1
250 TABLET, FILM COATED ORAL 2 TIMES DAILY
Status: DISCONTINUED | OUTPATIENT
Start: 2017-12-14 | End: 2017-12-14 | Stop reason: HOSPADM

## 2017-12-14 RX ADMIN — METOPROLOL SUCCINATE 50 MG: 25 TABLET, FILM COATED, EXTENDED RELEASE ORAL at 09:01

## 2017-12-14 RX ADMIN — AMLODIPINE BESYLATE 10 MG: 10 TABLET ORAL at 09:00

## 2017-12-14 RX ADMIN — LEVOTHYROXINE SODIUM 88 MCG: 88 TABLET ORAL at 05:15

## 2017-12-14 RX ADMIN — CIPROFLOXACIN HYDROCHLORIDE 250 MG: 250 TABLET, FILM COATED ORAL at 11:06

## 2017-12-14 RX ADMIN — Medication 1 TABLET: at 09:00

## 2017-12-14 RX ADMIN — HEPARIN SODIUM 5000 UNITS: 5000 INJECTION, SOLUTION INTRAVENOUS; SUBCUTANEOUS at 05:15

## 2017-12-14 RX ADMIN — BUDESONIDE AND FORMOTEROL FUMARATE DIHYDRATE 2 PUFF: 160; 4.5 AEROSOL RESPIRATORY (INHALATION) at 09:02

## 2017-12-14 RX ADMIN — ASPIRIN 81 MG: 81 TABLET, COATED ORAL at 09:00

## 2017-12-14 RX ADMIN — LEVALBUTEROL 1.25 MG: 1.25 SOLUTION, CONCENTRATE RESPIRATORY (INHALATION) at 13:49

## 2017-12-14 RX ADMIN — VITAMIN D, TAB 1000IU (100/BT) 2000 UNITS: 25 TAB at 09:00

## 2017-12-14 RX ADMIN — DIVALPROEX SODIUM 500 MG: 250 TABLET, FILM COATED, EXTENDED RELEASE ORAL at 09:00

## 2017-12-14 RX ADMIN — IPRATROPIUM BROMIDE 0.5 MG: 0.5 SOLUTION RESPIRATORY (INHALATION) at 13:50

## 2017-12-14 RX ADMIN — BUPROPION HYDROCHLORIDE 150 MG: 150 TABLET, FILM COATED, EXTENDED RELEASE ORAL at 09:01

## 2017-12-14 RX ADMIN — LORAZEPAM 0.5 MG: 0.5 TABLET ORAL at 13:17

## 2017-12-14 RX ADMIN — CARBAMAZEPINE 100 MG: 100 TABLET, CHEWABLE ORAL at 09:00

## 2017-12-14 RX ADMIN — Medication 325 MG: at 09:00

## 2017-12-14 NOTE — DISCHARGE SUMMARY
Discharge Summary - [facility] Internal Medicine    Patient Information: Gabbie Shoemaker 71 y o  female MRN: [id number]  Unit/Bed#: -01 Encounter: [id number]    Discharging Physician / Practitioner: Alexander Abraham DO  PCP: Merissa Yanez DO  Admission Date: 12/10/2017  Discharge Date: 12/14/17    Reason for Admission: Catheter malfunction    Discharge Diagnoses:     Principal Problem (Resolved):    Cystitis  Active Problems:    HTN (hypertension)    COPD (chronic obstructive pulmonary disease) ([facility])    CHF (congestive heart failure) ([facility] )  Resolved Problems:    Elevated troponin    TAM (acute kidney injury) ([facility] )    HPI: Gabbie Shoemaker is a 71 y o  female who presents with complaints of catheter malfunction  Patient has history of dementia and history is obtained from daughter  Per patient's daughter, her chronic indwelling catheter did stop draining yesterday  She has also noted that she has seemed more lethargic and emotional lately which is how she typically acts when she has a urinary tract infection  Patient low-grade fever  Patient denies chest pain, shortness of breath, abdominal pain  Patient states she does feel bloated like she has to urinate  Patient has no dyspnea on exertion  Patient has a history of CHF but EF is not known  Patient recently treated for bronchitis and since that time has had elevated blood pressure  Patient had a chronic indwelling catheter for the last 7 years secondary to  neurogenic bladder      Consultations During Hospital Stay:  · Urology  · Cardiology    Procedures Performed:     ·     Significant Findings:     · Refer to hospital course    Incidental Findings:   ·     Test Results Pending at Discharge (will require follow up):   ·      Outpatient Tests Requested:  ·     Complications:  none    Hospital Course:     # Acute cystitis w/ chronic indwelling catheter; did not meet sirs criteria  - > 100K citrobacter, sens to cipro and ctx; and 50-59K providencia (which does not require tx given insufficent colony ct, regardless sens to cipro) no growth per blood cx  - Transitioned to renally dosed cipro x 2 5 more days  - Chronic baca d/t neurogenic bladder  S/p urology consult, no  surgical intervention indicated  Baca to remain in place  F/u with pt's urologist Dr Sherita Josue     # NSTEMI type II d/t above, TAM  - Cardiology on board  - s/p echo, preserved EF and no regional wall motion abnl   - Hx of CAD s/p PCI; cont on medical management per cards  - Pt is cp free  - cont cardiac meds     # Dementia - MS at baseline as confirmed per dtr; cont meds     # HTN - stable     # COPD - no sign of exacerbation    Disp:  D/c to skilled nursing  Called her Dtr and discussed plan of care with her at [facility]    Condition at Discharge: stable     Discharge Day Visit / Exam:     Subjective:  Patient seen and examined, no complaints, no acute events overnight  Vitals: Blood Pressure: 170/77 (12/14/17 0700)  Pulse: 60 (12/14/17 0700)  Temperature: 98 1 °F (36 7 °C) (12/14/17 0700)  Temp Source: Oral (12/14/17 0700)  Respirations: 18 (12/14/17 0700)  Height: 5' (152 4 cm) (12/10/17 2300)  Weight - Scale: 87 1 kg (192 lb 0 3 oz) (12/10/17 2300)  SpO2: 92 % (12/14/17 0700)  Exam:   Physical Exam   Constitutional: She appears well-developed and well-nourished  Neck: Neck supple  Cardiovascular: Normal rate, regular rhythm, normal heart sounds and intact distal pulses  Exam reveals no gallop and no friction rub  No murmur heard  Pulmonary/Chest: Effort normal and breath sounds normal  No respiratory distress  She has no wheezes  She has no rales  She exhibits no tenderness  Abdominal: Soft  Bowel sounds are normal  She exhibits no distension and no mass  There is no tenderness  There is no rebound and no guarding  Genitourinary:   Genitourinary Comments: Chronic indwelling catheter   Musculoskeletal: Normal range of motion  She exhibits no edema, tenderness or deformity  Neurological: She is alert  She displays normal reflexes  No cranial nerve deficit  She exhibits normal muscle tone  Coordination normal    AAOx 1   Skin: Skin is warm and dry  No rash noted  No erythema  No pallor  Psychiatric: She has a normal mood and affect  Her behavior is normal    Baseline dementia       Discharge instructions/Information to patient and family:   See after visit summary for information provided to patient and family  Provisions for Follow-Up Care:  See after visit summary for information related to follow-up care and any pertinent home health orders  Disposition:     Assisted Living Facility at [facility]    For Discharges to [facility]:   · Not Applicable to this Patient - Not Applicable to this Patient    Planned Readmission: No      Discharge Statement:  I spent > 30 minutes discharging the patient  This time was spent on the day of discharge  I had direct contact with the patient on the day of discharge  Greater than 50% of the total time was spent examining patient, answering all patient questions, arranging and discussing plan of care with patient as well as directly providing post-discharge instructions  Additional time then spent on discharge activities  Discharge Medications:  See after visit summary for reconciled discharge medications provided to patient and family  ** Please Note: Dragon 360 Dictation voice to text software may have been used in the creation of this document   **

## 2017-12-14 NOTE — PLAN OF CARE
CARDIOVASCULAR - ADULT     Maintains optimal cardiac output and hemodynamic stability Adequate for Discharge     Absence of cardiac dysrhythmias or at baseline rhythm Adequate for Discharge        DISCHARGE PLANNING     Discharge to home or other facility with appropriate resources Adequate for Discharge        DISCHARGE PLANNING - CARE MANAGEMENT     Discharge to post-acute care or home with appropriate resources Adequate for Discharge        GENITOURINARY - ADULT     Maintains or returns to baseline urinary function Adequate for Discharge     Absence of urinary retention Adequate for Discharge     Urinary catheter remains patent Adequate for Discharge        INFECTION - ADULT     Absence or prevention of progression during hospitalization Adequate for Discharge        Knowledge Deficit     Patient/family/caregiver demonstrates understanding of disease process, treatment plan, medications, and discharge instructions Adequate for Discharge        NEUROSENSORY - ADULT     Absence of seizures Adequate for Discharge     Remains free of injury related to seizures activity Adequate for Discharge        PAIN - ADULT     Verbalizes/displays adequate comfort level or baseline comfort level Adequate for Discharge        Potential for Falls     Patient will remain free of falls Adequate for Discharge        Prexisting or High Potential for Compromised Skin Integrity     Skin integrity is maintained or improved Adequate for Discharge        SAFETY ADULT     Maintain or return to baseline ADL function Adequate for Discharge     Maintain or return mobility status to optimal level Adequate for Discharge

## 2017-12-14 NOTE — PLAN OF CARE
Problem: DISCHARGE PLANNING - CARE MANAGEMENT  Goal: Discharge to post-acute care or home with appropriate resources  INTERVENTIONS:  - Conduct assessment to determine patient/family and health care team treatment goals, and need for post-acute services based on payer coverage, community resources, and patient preferences, and barriers to discharge  - Address psychosocial, clinical, and financial barriers to discharge as identified in assessment in conjunction with the patient/family and health care team  - Arrange appropriate level of post-acute services according to patient's   needs and preference and payer coverage in collaboration with the physician and health care team  - Communicate with and update the patient/family, physician, and health care team regarding progress on the discharge plan  - Arrange appropriate transportation to post-acute venues   Outcome: Completed Date Met: 12/14/17  Pt to be discharged home to [facility] with daughter transporting at 1 pm

## 2017-12-14 NOTE — SOCIAL WORK
Pt to be discharged home to [facility] with daughter transporting at 1 pm

## 2017-12-14 NOTE — DISCHARGE INSTRUCTIONS
Catheter-associated Urinary Tract Infection   WHAT YOU NEED TO KNOW:   A catheter-associated urinary tract infection (CAUTI) is an infection caused by an indwelling urinary catheter  An indwelling urinary catheter is a thin, flexible tube that is inserted into the bladder  It is left in place to drain urine  The infection may travel along the catheter and into the bladder or kidneys  DISCHARGE INSTRUCTIONS:   Return to the emergency department if:   · You have severe pain in your lower back or abdomen  · You have blood in your urine  · You stop urinating, or you urinate much less than usual   Contact your healthcare provider if:   · You have a fever  · Your symptoms do not improve or get worse  · You have questions or concerns about your condition or care  Medicines: You may need any of the following:  · Antibiotics  help treat an infection caused by bacteria  · Antifungals  help treat an infection caused by fungus  · Acetaminophen  decreases pain and fever  It is available without a doctor's order  Ask how much to take and how often to take it  Follow directions  Read the labels of all other medicines you are using to see if they also contain acetaminophen, or ask your doctor or pharmacist  Acetaminophen can cause liver damage if not taken correctly  Do not use more than 4 grams (4,000 milligrams) total of acetaminophen in one day  · NSAIDs , such as ibuprofen, help decrease swelling, pain, and fever  This medicine is available with or without a doctor's order  NSAIDs can cause stomach bleeding or kidney problems in certain people  If you take blood thinner medicine, always ask your healthcare provider if NSAIDs are safe for you  Always read the medicine label and follow directions  · Take your medicine as directed  Contact your healthcare provider if you think your medicine is not helping or if you have side effects  Tell him of her if you are allergic to any medicine  Keep a list of the medicines, vitamins, and herbs you take  Include the amounts, and when and why you take them  Bring the list or the pill bottles to follow-up visits  Carry your medicine list with you in case of an emergency  Self-care:   · Drink fluids as directed  Fluids may help your kidneys and bladder get rid of the infection  · Keep the catheter area clean  Clean your skin around the catheter as directed  Shower once a day  Do not take baths or go in hot tubs until your infection is gone  · Do not have sex  until your healthcare provider says it is okay  Sex may delay healing or cause another UTI  Prevent another CAUTI:   · Wash your hands before and after you use the bathroom or touch the catheter  Wash your hands to prevent the spread of infection to your urinary tract  · Clean all parts of your catheter as directed  Keep your catheter tubing clean  Do not place the catheter on the ground  Do not allow the drainage spout to touch the toilet  Use an alcohol swab to clean the end of drainage spout as directed  · Keep the drainage bag below your waist   This may prevent urine from moving back into your bladder, which can cause an infection  · Empty the urine bag as directed  This may prevent urine from flowing back into your bladder  · Women should wipe front to back  after a bowel movement  This may prevent germs from getting into the urinary tract  · Keep the catheter secured to your leg as directed  Use tape or a special catheter montero to prevent your catheter from being pulled  This may also prevent kinks that could cause the urine to move back into the bladder  Follow up with your healthcare provider as directed:  Write down your questions so you remember to ask them during your visits  © 2017 [employer] Information is for End User's use only and may not be sold, redistributed or otherwise used for commercial purposes   All illustrations and images included in [employer] are the copyrighted property of [employer]  or Ronald King  The above information is an  only  It is not intended as medical advice for individual conditions or treatments  Talk to your doctor, nurse or pharmacist before following any medical regimen to see if it is safe and effective for you  Catheter-associated Urinary Tract Infection   WHAT YOU NEED TO KNOW:   A catheter-associated urinary tract infection (CAUTI) is an infection caused by an indwelling urinary catheter  An indwelling urinary catheter is a thin, flexible tube that is inserted into the bladder  It is left in place to drain urine  The infection may travel along the catheter and into the bladder or kidneys  DISCHARGE INSTRUCTIONS:   Seek care immediately if:   · You have severe pain in your lower back or abdomen  · You have blood in your urine  · You stop urinating, or you urinate much less than usual   Contact your healthcare provider if:   · You have a fever  · Your symptoms do not improve or get worse  · You have questions or concerns about your condition or care  Medicines: You may need any of the following:  · Antibiotics  help treat an infection caused by bacteria  · Antifungals  help treat an infection caused by fungus  · Acetaminophen  decreases pain and fever  It is available without a doctor's order  Ask how much to take and how often to take it  Follow directions  Read the labels of all other medicines you are using to see if they also contain acetaminophen, or ask your doctor or pharmacist  Acetaminophen can cause liver damage if not taken correctly  Do not use more than 4 grams (4,000 milligrams) total of acetaminophen in one day  · NSAIDs , such as ibuprofen, help decrease swelling, pain, and fever  This medicine is available with or without a doctor's order  NSAIDs can cause stomach bleeding or kidney problems in certain people   If you take blood thinner medicine, always ask your healthcare provider if NSAIDs are safe for you  Always read the medicine label and follow directions  · Take your medicine as directed  Contact your healthcare provider if you think your medicine is not helping or if you have side effects  Tell him of her if you are allergic to any medicine  Keep a list of the medicines, vitamins, and herbs you take  Include the amounts, and when and why you take them  Bring the list or the pill bottles to follow-up visits  Carry your medicine list with you in case of an emergency  Self-care:   · Drink fluids as directed  Fluids may help your kidneys and bladder get rid of the infection  · Keep the catheter area clean  Clean your skin around the catheter as directed  Shower once a day  Do not take baths or go in hot tubs until your infection is gone  · Do not have sex  until your healthcare provider says it is okay  Sex may delay healing or cause another UTI  Prevent another CAUTI:   · Wash your hands before and after you use the bathroom or touch the catheter  Wash your hands to prevent the spread of infection to your urinary tract  · Clean all parts of your catheter as directed  Keep your catheter tubing clean  Do not place the catheter on the ground  Do not allow the drainage spout to touch the toilet  Use an alcohol swab to clean the end of drainage spout as directed  · Keep the drainage bag below your waist   This will prevent urine from moving back into your bladder, which can cause an infection  · Empty the urine bag as directed  This may prevent urine from moving back into your bladder  · Women should wipe front to back  after a bowel movement  This may prevent germs from getting into the urinary tract  · Keep the catheter secured to your leg as directed  Use tape or a special catheter montero to prevent your catheter from being pulled   This may also prevent kinks that could cause the urine to move back into the bladder  Follow up with your healthcare provider as directed:  Write down your questions so you remember to ask them during your visits  © 2017 [employer] Information is for End User's use only and may not be sold, redistributed or otherwise used for commercial purposes  All illustrations and images included in CareNotes® are the copyrighted property of A [employer] M [employer] Inc  or Ronald King  The above information is an  only  It is not intended as medical advice for individual conditions or treatments  Talk to your doctor, nurse or pharmacist before following any medical regimen to see if it is safe and effective for you

## 2017-12-14 NOTE — PROGRESS NOTES
Progress Note - Cardiology     Demario Sickle 71 y o  female MRN: [id number]  Unit/Bed#: - Encounter: [id number]      Subjective:   No significant events overnight  Patient denies chest pain, SOB, palpitations, lightheadedness  REVIEW OF SYSTEMS:  Constitutional: Denies fever or chills   Respiratory: Denies cough, expectoration or shortness of breath   Cardiovascular: Denies chest pain, palpitations or edema   GI: Denies abdominal pain, nausea, vomiting, bloody stools or diarrhea   Neurologic: Denies headache, focal weakness or sensory changes   Psychiatric: Denies depression or anxiety       Objective:   Vitals:  Vitals:    17 0700   BP: 170/77   Pulse: 60   Resp: 18   Temp: 98 1 °F (36 7 °C)   SpO2: 92%       Body mass index is 37 5 kg/m²  Systolic (01RWF), HO , Min:135 , OAF:704     Diastolic (79CTT), VXF:06, Min:66, Max:77      Intake/Output Summary (Last 24 hours) at 17 1127  Last data filed at 17 0901   Gross per 24 hour   Intake              480 ml   Output             1050 ml   Net             -570 ml     Weight (last 2 days)     None          PHYSICAL EXAM:  General: Patient is not in acute distress  Awake, alert, responding to commands  Head: Normocephalic  Atraumatic  HEENT: Both pupils normal sized, round and reactive to light  Nonicteric  Neck: JVP not raised  Trachea central  No thyromegaly  Respiratory: Bilateral bronchovascular breath sounds with no crackles or rhonchi  Cardiovascular: RRR  S1 and S2 normal  No murmur, rub or gallop  GI: Abdomen soft, nontender  No guarding or rigidity  Liver and spleen not palpable  Bowel sounds present  Neurologic: Patient is awake, alert, responding to command   Moving all extremities  Integumentary:  No skin rash  Lymphatic: No cervical lymphadenopathy  Extremities: No clubbing, cyanosis or edema      LABORATORY RESULTS:    Results from last 7 days  Lab Units 17  0320 17  0104 12/10/17  2123   TROPONIN I ng/mL 0 11* 0 11* 0 08*     CBC with diff:   Results from last 7 days  Lab Units 12/14/17  0603 12/13/17  0510 12/12/17  0446   WBC Thousand/uL 5 05 5 25 5 50   HEMOGLOBIN g/dL 11 8 11 2* 11 7   HEMATOCRIT % 36 3 33 9* 35 9   MCV fL 101* 101* 102*   PLATELETS Thousands/uL 146* 137* 133*   MCH pg 33 0 33 4 33 2   MCHC g/dL 32 5 33 0 32 6   RDW % 11 9 11 9 11 9   MPV fL 11 1 11 4 10 9   NRBC AUTO /100 WBCs 0 0 0       CMP:  Results from last 7 days  Lab Units 12/14/17  0603 12/13/17  0510 12/12/17 0446  12/10/17  2123   SODIUM mmol/L 147* 144 144  < > 147*   POTASSIUM mmol/L 4 0 4 1 4 3  < > 3 8   CHLORIDE mmol/L 111* 111* 109*  < > 109*   CO2 mmol/L 26 26 28  < > 27   ANION GAP mmol/L 10 7 7  < > 11   BUN mg/dL 27* 24 21  < > 31*   CREATININE mg/dL 1 26 1 21 1 24  < > 1 73*   GLUCOSE RANDOM mg/dL 113 102 116  < > 166*   CALCIUM mg/dL 9 4 8 9 9 2  < > 9 8   AST U/L  --   --   --   --  14   ALT U/L  --   --   --   --  18   ALK PHOS U/L  --   --   --   --  55   TOTAL PROTEIN g/dL  --   --   --   --  7 6   ALBUMIN g/dL  --   --   --   --  3 6   BILIRUBIN TOTAL mg/dL  --   --   --   --  0 20   EGFR ml/min/1 73sq m 43 45 44  < > 29   < > = values in this interval not displayed      BMP:  Results from last 7 days  Lab Units 12/14/17  0603 12/13/17  0510 12/12/17 0446   SODIUM mmol/L 147* 144 144   POTASSIUM mmol/L 4 0 4 1 4 3   CHLORIDE mmol/L 111* 111* 109*   CO2 mmol/L 26 26 28   BUN mg/dL 27* 24 21   CREATININE mg/dL 1 26 1 21 1 24   GLUCOSE RANDOM mg/dL 113 102 116   CALCIUM mg/dL 9 4 8 9 9 2         Results from last 7 days  Lab Units 12/10/17  2123   NT-PRO BNP pg/mL 958*          Results from last 7 days  Lab Units 12/10/17  2123   MAGNESIUM mg/dL 2 0         Results from last 7 days  Lab Units 12/10/17  2123   INR  1 05       Cardiac testing:   Results for orders placed during the hospital encounter of 12/10/17   Echo complete with contrast if indicated    Narrative [address]  [address] [place] [address] [phone]    Transthoracic Echocardiogram  2D, M-mode, Doppler, and Color Doppler    Study date:  11-Dec-2017    Patient: Nimesh Castellanos  MR number: [id number]  Account number: [de-identified]  : 1945  Age: 71 years  Gender: Female  Status: Inpatient  Location: Bedside  Height: 60 in  Weight: 192 lb  BP: 99/ 50 mmHg    Indications: Shortness of breath  Diagnoses: R06 00 - Dyspnea, unspecified, R06 02 - Shortness of breath    Sonographer:  Eileen Polo  Interpreting Physician:  Amy Vásquez MD  Referring Physician:  Fawad Muro PA-C  Group:  [facility]    SUMMARY    LEFT VENTRICLE:  Systolic function was normal  Ejection fraction was estimated in the range of 55 % to 65 %  There were no regional wall motion abnormalities  There was mild concentric hypertrophy  Doppler parameters were consistent with abnormal left ventricular relaxation (grade 1 diastolic dysfunction)  MITRAL VALVE:  There was mild annular calcification  There was mild regurgitation  AORTIC VALVE:  There was mild regurgitation  TRICUSPID VALVE:  There was mild regurgitation  PULMONIC VALVE:  There was mild regurgitation  HISTORY: PRIOR HISTORY: Elevated trponin, Dementia, Seizures, Stroke, Myocardial infarction  Congestive heart failure  Risk factors: hypertension and hypercholesterolemia  Chronic lung disease  PROCEDURE: The procedure was performed at the bedside  This was a routine study  The transthoracic approach was used  The study included complete 2D imaging, M-mode, complete spectral Doppler, and color Doppler  The heart rate was 68 bpm,  at the start of the study  Images were obtained from the parasternal, apical, subcostal, and suprasternal notch acoustic windows  Echocardiographic views were limited due to poor acoustic window availability, decreased penetration, and  lung interference  This was a technically difficult study      LEFT VENTRICLE: Size was normal  Systolic function was normal  Ejection fraction was estimated in the range of 55 % to 65 %  There were no regional wall motion abnormalities  Wall thickness was normal  There was mild concentric  hypertrophy  DOPPLER: Doppler parameters were consistent with abnormal left ventricular relaxation (grade 1 diastolic dysfunction)  RIGHT VENTRICLE: The size was normal  Systolic function was normal  Wall thickness was normal     LEFT ATRIUM: Size was normal     RIGHT ATRIUM: Size was normal     MITRAL VALVE: There was mild annular calcification  Valve structure was normal  There was normal leaflet separation  DOPPLER: The transmitral velocity was within the normal range  There was no evidence for stenosis  There was mild  regurgitation  AORTIC VALVE: The valve was trileaflet  Leaflets exhibited normal thickness and normal cuspal separation  DOPPLER: Transaortic velocity was within the normal range  There was no evidence for stenosis  There was mild regurgitation  TRICUSPID VALVE: The valve structure was normal  There was normal leaflet separation  DOPPLER: The transtricuspid velocity was within the normal range  There was no evidence for stenosis  There was mild regurgitation  PULMONIC VALVE: Leaflets exhibited normal thickness, no calcification, and normal cuspal separation  DOPPLER: The transpulmonic velocity was within the normal range  There was mild regurgitation  PERICARDIUM: There was no pericardial effusion  The pericardium was normal in appearance  AORTA: The root exhibited normal size      SYSTEM MEASUREMENT TABLES    2D  %FS: 29 2 %  Ao Diam: 3 1 cm  EDV(Teich): 140 8 ml  EF(Teich): 55 6 %  ESV(Teich): 62 5 ml  IVSd: 1 1 cm  LA Area: 20 9 cm2  LA Diam: 3 7 cm  LVEDV MOD A4C: 117 1 ml  LVEF MOD A4C: 50 3 %  LVESV MOD A4C: 58 2 ml  LVIDd: 5 4 cm  LVIDs: 3 8 cm  LVLd A4C: 8 5 cm  LVLs A4C: 7 1 cm  LVPWd: 1 1 cm  RA Area: 17 5 cm2  RVIDd: 3 2 cm  SV MOD A4C: 59 ml  SV(Teich): 78 3 ml    CW  AR Dec [place]: 1 9 m/s2  AR Dec Time: 2208 1 ms  AR PHT: 640 4 ms  AR Vmax: 4 1 m/s  AR maxP 4 mmHg  TR Vmax: 2 5 m/s  TR maxP mmHg    MM  TAPSE: 1 4 cm    PW  AVC: 389 7 ms  E': 0 1 m/s  E/E': 15 3  MV A [place]: 1 m/s  MV Dec [place]: 3 4 m/s2  MV DecT: 240 3 ms  MV E Anoop: 0 8 m/s  MV E/A Ratio: 0 8  MV PHT: 69 7 ms  MVA By PHT: 3 2 cm2    [facility]redited [facility]    Prepared and electronically signed by    Boni Howell MD  Signed 11-Dec-2017 12:09:00           Meds/Allergies   current meds:   Current Facility-Administered Medications   Medication Dose Route Frequency    acetaminophen (TYLENOL) tablet 650 mg  650 mg Oral Q6H PRN    albuterol inhalation solution 2 5 mg  2 5 mg Nebulization Q4H PRN    amLODIPine (NORVASC) tablet 10 mg  10 mg Oral Daily    aspirin (ECOTRIN LOW STRENGTH) EC tablet 81 mg  81 mg Oral Daily    budesonide-formoterol (SYMBICORT) 160-4 5 mcg/act inhaler 2 puff  2 puff Inhalation BID    buPROPion (WELLBUTRIN XL) 24 hr tablet 150 mg  150 mg Oral Daily    carBAMazepine (TEGretol) chewable tablet 100 mg  100 mg Oral TID    cholecalciferol (VITAMIN D3) tablet 2,000 Units  2,000 Units Oral Daily    ciprofloxacin (CIPRO) tablet 250 mg  250 mg Oral BID    cloNIDine (CATAPRES) tablet 0 2 mg  0 2 mg Oral HS    divalproex sodium (DEPAKOTE ER) 24 hr tablet 500 mg  500 mg Oral Daily    ferrous sulfate tablet 325 mg  325 mg Oral BID    heparin (porcine) subcutaneous injection 5,000 Units  5,000 Units Subcutaneous Q8H [facility]    ipratropium-albuterol (DUO-NEB) 0 5-2 5 mg/mL inhalation solution 3 mL  3 mL Nebulization TID    levothyroxine tablet 88 mcg  88 mcg Oral Early Morning    LORazepam (ATIVAN) tablet 0 5 mg  0 5 mg Oral BID    metoprolol (LOPRESSOR) injection 5 mg  5 mg Intravenous Q6H PRN    metoprolol succinate (TOPROL-XL) 24 hr tablet 50 mg  50 mg Oral QAM    ondansetron (ZOFRAN) injection 4 mg  4 mg Intravenous Q6H PRN    pravastatin (PRAVACHOL) tablet 40 mg  40 mg Oral Daily With Dinner    senna-docusate sodium (SENOKOT S) 8 6-50 mg per tablet 1 tablet  1 tablet Oral BID     Prescriptions Prior to Admission   Medication    acetaminophen (TYLENOL) 500 mg tablet    albuterol (2 5 mg/3 mL) 0 083 % nebulizer solution    albuterol (PROVENTIL HFA,VENTOLIN HFA) 90 mcg/act inhaler    amLODIPine (NORVASC) 10 mg tablet    aspirin (ECOTRIN LOW STRENGTH) 81 mg EC tablet    budesonide-formoterol (SYMBICORT) 160-4 5 mcg/act inhaler    buPROPion (WELLBUTRIN SR) 150 mg 12 hr tablet    carBAMazepine (TEGretol) 100 mg chewable tablet    Cholecalciferol (VITAMIN D) 2000 units CAPS    cloNIDine (CATAPRES) 0 1 mg tablet    divalproex sodium (DEPAKOTE ER) 250 mg 24 hr tablet    fenofibrate micronized (LOFIBRA) 67 MG capsule    ferrous sulfate 325 (65 Fe) mg tablet    ipratropium-albuterol (DUO-NEB) 0 5-2 5 mg/mL    Levothyroxine Sodium 88 MCG CAPS    LORazepam (ATIVAN) 0 5 mg tablet    metoprolol tartrate (LOPRESSOR) 50 mg tablet    Sennosides-Docusate Sodium (SENNA PLUS PO)    simvastatin (ZOCOR) 20 mg tablet            Assessment and Plan:     1   Elevated troponin likely NSTEMI type 2 from UTI with sepsis or it could also  be secondary to renal impairment       2   Cystitis - continue antibiotics   Management per SLIM / urology      3   Hypertension - BP high this am, usually has been stable   Continue to monitor       4   CAD s/p PCI - Given patient's underlying dementia, plan is for medical / conservative treatment  Stable  Echocardiogram with normal EF and no RWMA  Continue aspirin, beta-blocker, statin     5   Hyperlipidemia - continue with pravastatin  ** Please Note: Dictation voice to text software may have been used in the creation of this document   **

## 2017-12-16 LAB
BACTERIA BLD CULT: NORMAL
BACTERIA BLD CULT: NORMAL

## 2018-03-22 ENCOUNTER — HOSPITAL ENCOUNTER (INPATIENT)
Facility: HOSPITAL | Age: 73
LOS: 8 days | Discharge: NURSING, REHAB OR LONG-TERM CARE | DRG: 308 | End: 2018-03-30
Attending: EMERGENCY MEDICINE | Admitting: GENERAL PRACTICE
Payer: MEDICARE

## 2018-03-22 ENCOUNTER — APPOINTMENT (EMERGENCY)
Dept: CT IMAGING | Facility: HOSPITAL | Age: 73
DRG: 308 | End: 2018-03-22
Payer: MEDICARE

## 2018-03-22 DIAGNOSIS — I48.92 NEW ONSET ATRIAL FLUTTER (HCC): ICD-10-CM

## 2018-03-22 DIAGNOSIS — I50.9 CHF (CONGESTIVE HEART FAILURE) (HCC): Chronic | ICD-10-CM

## 2018-03-22 DIAGNOSIS — R09.02 HYPOXIA: ICD-10-CM

## 2018-03-22 DIAGNOSIS — N39.0 URINARY TRACT INFECTION WITHOUT HEMATURIA, SITE UNSPECIFIED: ICD-10-CM

## 2018-03-22 DIAGNOSIS — J96.00 ACUTE RESPIRATORY FAILURE (HCC): Primary | ICD-10-CM

## 2018-03-22 DIAGNOSIS — M54.9 BACK PAIN: ICD-10-CM

## 2018-03-22 PROBLEM — I50.23 ACUTE ON CHRONIC SYSTOLIC CHF (CONGESTIVE HEART FAILURE) (HCC): Status: ACTIVE | Noted: 2018-03-22

## 2018-03-22 LAB
ALBUMIN SERPL BCP-MCNC: 3.4 G/DL (ref 3.5–5)
ALP SERPL-CCNC: 52 U/L (ref 46–116)
ALT SERPL W P-5'-P-CCNC: 26 U/L (ref 12–78)
AMORPH URATE CRY URNS QL MICRO: ABNORMAL /HPF
ANION GAP SERPL CALCULATED.3IONS-SCNC: 9 MMOL/L (ref 4–13)
APTT PPP: 30 SECONDS (ref 23–35)
AST SERPL W P-5'-P-CCNC: 17 U/L (ref 5–45)
ATRIAL RATE: 278 BPM
ATRIAL RATE: 282 BPM
ATRIAL RATE: 286 BPM
BACTERIA UR QL AUTO: ABNORMAL /HPF
BASOPHILS # BLD AUTO: 0.03 THOUSANDS/ΜL (ref 0–0.1)
BASOPHILS NFR BLD AUTO: 1 % (ref 0–1)
BILIRUB SERPL-MCNC: 0.3 MG/DL (ref 0.2–1)
BILIRUB UR QL STRIP: NEGATIVE
BUN SERPL-MCNC: 26 MG/DL (ref 5–25)
CALCIUM SERPL-MCNC: 9.2 MG/DL (ref 8.3–10.1)
CHLORIDE SERPL-SCNC: 108 MMOL/L (ref 100–108)
CLARITY UR: ABNORMAL
CO2 SERPL-SCNC: 28 MMOL/L (ref 21–32)
COLOR UR: YELLOW
CREAT SERPL-MCNC: 1.49 MG/DL (ref 0.6–1.3)
EOSINOPHIL # BLD AUTO: 0.27 THOUSAND/ΜL (ref 0–0.61)
EOSINOPHIL NFR BLD AUTO: 4 % (ref 0–6)
ERYTHROCYTE [DISTWIDTH] IN BLOOD BY AUTOMATED COUNT: 12.7 % (ref 11.6–15.1)
GFR SERPL CREATININE-BSD FRML MDRD: 35 ML/MIN/1.73SQ M
GLUCOSE SERPL-MCNC: 110 MG/DL (ref 65–140)
GLUCOSE UR STRIP-MCNC: NEGATIVE MG/DL
HCT VFR BLD AUTO: 38.7 % (ref 34.8–46.1)
HGB BLD-MCNC: 12.4 G/DL (ref 11.5–15.4)
HGB UR QL STRIP.AUTO: ABNORMAL
INR PPP: 1.19 (ref 0.86–1.16)
KETONES UR STRIP-MCNC: NEGATIVE MG/DL
LEUKOCYTE ESTERASE UR QL STRIP: ABNORMAL
LYMPHOCYTES # BLD AUTO: 1.85 THOUSANDS/ΜL (ref 0.6–4.47)
LYMPHOCYTES NFR BLD AUTO: 29 % (ref 14–44)
MAGNESIUM SERPL-MCNC: 2.1 MG/DL (ref 1.6–2.6)
MCH RBC QN AUTO: 33.1 PG (ref 26.8–34.3)
MCHC RBC AUTO-ENTMCNC: 32 G/DL (ref 31.4–37.4)
MCV RBC AUTO: 103 FL (ref 82–98)
MONOCYTES # BLD AUTO: 0.71 THOUSAND/ΜL (ref 0.17–1.22)
MONOCYTES NFR BLD AUTO: 11 % (ref 4–12)
NEUTROPHILS # BLD AUTO: 3.4 THOUSANDS/ΜL (ref 1.85–7.62)
NEUTS SEG NFR BLD AUTO: 54 % (ref 43–75)
NITRITE UR QL STRIP: POSITIVE
NON-SQ EPI CELLS URNS QL MICRO: ABNORMAL /HPF
NRBC BLD AUTO-RTO: 0 /100 WBCS
NT-PROBNP SERPL-MCNC: 9236 PG/ML
P AXIS: 83 DEGREES
PH UR STRIP.AUTO: 6.5 [PH] (ref 4.5–8)
PLATELET # BLD AUTO: 149 THOUSANDS/UL (ref 149–390)
PMV BLD AUTO: 12.1 FL (ref 8.9–12.7)
POTASSIUM SERPL-SCNC: 3.9 MMOL/L (ref 3.5–5.3)
PROT SERPL-MCNC: 7.1 G/DL (ref 6.4–8.2)
PROT UR STRIP-MCNC: ABNORMAL MG/DL
PROTHROMBIN TIME: 15.4 SECONDS (ref 12.1–14.4)
QRS AXIS: 11 DEGREES
QRS AXIS: 32 DEGREES
QRS AXIS: 37 DEGREES
QRSD INTERVAL: 88 MS
QRSD INTERVAL: 96 MS
QRSD INTERVAL: 98 MS
QT INTERVAL: 368 MS
QT INTERVAL: 394 MS
QT INTERVAL: 394 MS
QTC INTERVAL: 451 MS
QTC INTERVAL: 563 MS
QTC INTERVAL: 564 MS
RBC # BLD AUTO: 3.75 MILLION/UL (ref 3.81–5.12)
RBC #/AREA URNS AUTO: ABNORMAL /HPF
SODIUM SERPL-SCNC: 145 MMOL/L (ref 136–145)
SP GR UR STRIP.AUTO: 1.02 (ref 1–1.03)
T WAVE AXIS: 66 DEGREES
T WAVE AXIS: 71 DEGREES
T WAVE AXIS: 79 DEGREES
TROPONIN I SERPL-MCNC: <0.02 NG/ML
TROPONIN I SERPL-MCNC: <0.02 NG/ML
TSH SERPL DL<=0.05 MIU/L-ACNC: 1.42 UIU/ML (ref 0.36–3.74)
UROBILINOGEN UR QL STRIP.AUTO: 0.2 E.U./DL
VENTRICULAR RATE: 123 BPM
VENTRICULAR RATE: 141 BPM
VENTRICULAR RATE: 79 BPM
WBC # BLD AUTO: 6.3 THOUSAND/UL (ref 4.31–10.16)
WBC #/AREA URNS AUTO: ABNORMAL /HPF

## 2018-03-22 PROCEDURE — 85610 PROTHROMBIN TIME: CPT | Performed by: EMERGENCY MEDICINE

## 2018-03-22 PROCEDURE — 87077 CULTURE AEROBIC IDENTIFY: CPT | Performed by: EMERGENCY MEDICINE

## 2018-03-22 PROCEDURE — 84484 ASSAY OF TROPONIN QUANT: CPT | Performed by: EMERGENCY MEDICINE

## 2018-03-22 PROCEDURE — 81001 URINALYSIS AUTO W/SCOPE: CPT | Performed by: EMERGENCY MEDICINE

## 2018-03-22 PROCEDURE — 87086 URINE CULTURE/COLONY COUNT: CPT | Performed by: EMERGENCY MEDICINE

## 2018-03-22 PROCEDURE — 99285 EMERGENCY DEPT VISIT HI MDM: CPT

## 2018-03-22 PROCEDURE — 87798 DETECT AGENT NOS DNA AMP: CPT | Performed by: ANESTHESIOLOGY

## 2018-03-22 PROCEDURE — 87147 CULTURE TYPE IMMUNOLOGIC: CPT | Performed by: EMERGENCY MEDICINE

## 2018-03-22 PROCEDURE — 94640 AIRWAY INHALATION TREATMENT: CPT

## 2018-03-22 PROCEDURE — 84443 ASSAY THYROID STIM HORMONE: CPT | Performed by: EMERGENCY MEDICINE

## 2018-03-22 PROCEDURE — 96361 HYDRATE IV INFUSION ADD-ON: CPT

## 2018-03-22 PROCEDURE — 71250 CT THORAX DX C-: CPT

## 2018-03-22 PROCEDURE — 85730 THROMBOPLASTIN TIME PARTIAL: CPT | Performed by: EMERGENCY MEDICINE

## 2018-03-22 PROCEDURE — 93005 ELECTROCARDIOGRAM TRACING: CPT

## 2018-03-22 PROCEDURE — 94660 CPAP INITIATION&MGMT: CPT

## 2018-03-22 PROCEDURE — 36415 COLL VENOUS BLD VENIPUNCTURE: CPT | Performed by: EMERGENCY MEDICINE

## 2018-03-22 PROCEDURE — 83880 ASSAY OF NATRIURETIC PEPTIDE: CPT | Performed by: EMERGENCY MEDICINE

## 2018-03-22 PROCEDURE — 94760 N-INVAS EAR/PLS OXIMETRY 1: CPT

## 2018-03-22 PROCEDURE — 93010 ELECTROCARDIOGRAM REPORT: CPT | Performed by: INTERNAL MEDICINE

## 2018-03-22 PROCEDURE — 74176 CT ABD & PELVIS W/O CONTRAST: CPT

## 2018-03-22 PROCEDURE — 96375 TX/PRO/DX INJ NEW DRUG ADDON: CPT

## 2018-03-22 PROCEDURE — 80053 COMPREHEN METABOLIC PANEL: CPT | Performed by: EMERGENCY MEDICINE

## 2018-03-22 PROCEDURE — 84484 ASSAY OF TROPONIN QUANT: CPT | Performed by: PHYSICIAN ASSISTANT

## 2018-03-22 PROCEDURE — 83735 ASSAY OF MAGNESIUM: CPT | Performed by: EMERGENCY MEDICINE

## 2018-03-22 PROCEDURE — 85025 COMPLETE CBC W/AUTO DIFF WBC: CPT | Performed by: EMERGENCY MEDICINE

## 2018-03-22 PROCEDURE — 87186 SC STD MICRODIL/AGAR DIL: CPT | Performed by: EMERGENCY MEDICINE

## 2018-03-22 PROCEDURE — 96365 THER/PROPH/DIAG IV INF INIT: CPT

## 2018-03-22 RX ORDER — SENNOSIDES 8.6 MG
1 TABLET ORAL
Status: DISCONTINUED | OUTPATIENT
Start: 2018-03-22 | End: 2018-03-30 | Stop reason: HOSPADM

## 2018-03-22 RX ORDER — CHLORHEXIDINE GLUCONATE 0.12 MG/ML
15 RINSE ORAL EVERY 12 HOURS SCHEDULED
Status: DISCONTINUED | OUTPATIENT
Start: 2018-03-22 | End: 2018-03-25

## 2018-03-22 RX ORDER — FERROUS SULFATE 325(65) MG
325 TABLET ORAL 2 TIMES DAILY
Status: DISCONTINUED | OUTPATIENT
Start: 2018-03-22 | End: 2018-03-30 | Stop reason: HOSPADM

## 2018-03-22 RX ORDER — DILTIAZEM HYDROCHLORIDE 5 MG/ML
15 INJECTION INTRAVENOUS ONCE
Status: COMPLETED | OUTPATIENT
Start: 2018-03-22 | End: 2018-03-22

## 2018-03-22 RX ORDER — DILTIAZEM HYDROCHLORIDE 60 MG/1
60 TABLET, FILM COATED ORAL EVERY 6 HOURS SCHEDULED
Status: DISCONTINUED | OUTPATIENT
Start: 2018-03-23 | End: 2018-03-30 | Stop reason: HOSPADM

## 2018-03-22 RX ORDER — METOPROLOL TARTRATE 50 MG/1
50 TABLET, FILM COATED ORAL EVERY 6 HOURS
Status: DISCONTINUED | OUTPATIENT
Start: 2018-03-22 | End: 2018-03-22

## 2018-03-22 RX ORDER — MAGNESIUM SULFATE HEPTAHYDRATE 40 MG/ML
4 INJECTION, SOLUTION INTRAVENOUS ONCE
Status: COMPLETED | OUTPATIENT
Start: 2018-03-22 | End: 2018-03-23

## 2018-03-22 RX ORDER — CARBAMAZEPINE 100 MG/1
100 TABLET, CHEWABLE ORAL 3 TIMES DAILY
Status: DISCONTINUED | OUTPATIENT
Start: 2018-03-22 | End: 2018-03-30 | Stop reason: HOSPADM

## 2018-03-22 RX ORDER — ASPIRIN 81 MG/1
81 TABLET ORAL DAILY
Status: DISCONTINUED | OUTPATIENT
Start: 2018-03-23 | End: 2018-03-30 | Stop reason: HOSPADM

## 2018-03-22 RX ORDER — DOCUSATE SODIUM 100 MG/1
100 CAPSULE, LIQUID FILLED ORAL 2 TIMES DAILY
Status: DISCONTINUED | OUTPATIENT
Start: 2018-03-22 | End: 2018-03-30 | Stop reason: HOSPADM

## 2018-03-22 RX ORDER — DIVALPROEX SODIUM 500 MG/1
500 TABLET, EXTENDED RELEASE ORAL DAILY
Status: DISCONTINUED | OUTPATIENT
Start: 2018-03-23 | End: 2018-03-30 | Stop reason: HOSPADM

## 2018-03-22 RX ORDER — ALBUTEROL SULFATE 2.5 MG/3ML
5 SOLUTION RESPIRATORY (INHALATION) ONCE
Status: DISCONTINUED | OUTPATIENT
Start: 2018-03-22 | End: 2018-03-24

## 2018-03-22 RX ORDER — HEPARIN SODIUM 10000 [USP'U]/100ML
3-20 INJECTION, SOLUTION INTRAVENOUS
Status: DISCONTINUED | OUTPATIENT
Start: 2018-03-22 | End: 2018-03-29

## 2018-03-22 RX ORDER — HEPARIN SODIUM 1000 [USP'U]/ML
4000 INJECTION, SOLUTION INTRAVENOUS; SUBCUTANEOUS AS NEEDED
Status: DISCONTINUED | OUTPATIENT
Start: 2018-03-22 | End: 2018-03-29

## 2018-03-22 RX ORDER — NITROGLYCERIN 0.4 MG/1
0.4 TABLET SUBLINGUAL ONCE
Status: DISCONTINUED | OUTPATIENT
Start: 2018-03-22 | End: 2018-03-22

## 2018-03-22 RX ORDER — HEPARIN SODIUM 1000 [USP'U]/ML
4000 INJECTION, SOLUTION INTRAVENOUS; SUBCUTANEOUS ONCE
Status: COMPLETED | OUTPATIENT
Start: 2018-03-22 | End: 2018-03-22

## 2018-03-22 RX ORDER — BUPROPION HYDROCHLORIDE 150 MG/1
150 TABLET, EXTENDED RELEASE ORAL DAILY
Status: DISCONTINUED | OUTPATIENT
Start: 2018-03-23 | End: 2018-03-23 | Stop reason: CLARIF

## 2018-03-22 RX ORDER — LEVALBUTEROL INHALATION SOLUTION 0.63 MG/3ML
0.63 SOLUTION RESPIRATORY (INHALATION) ONCE
Status: COMPLETED | OUTPATIENT
Start: 2018-03-22 | End: 2018-03-22

## 2018-03-22 RX ORDER — BUDESONIDE AND FORMOTEROL FUMARATE DIHYDRATE 160; 4.5 UG/1; UG/1
2 AEROSOL RESPIRATORY (INHALATION) 2 TIMES DAILY
Status: DISCONTINUED | OUTPATIENT
Start: 2018-03-22 | End: 2018-03-30 | Stop reason: HOSPADM

## 2018-03-22 RX ORDER — DILTIAZEM HYDROCHLORIDE 5 MG/ML
INJECTION INTRAVENOUS
Status: DISPENSED
Start: 2018-03-22 | End: 2018-03-23

## 2018-03-22 RX ORDER — METOPROLOL TARTRATE 5 MG/5ML
5 INJECTION INTRAVENOUS ONCE
Status: COMPLETED | OUTPATIENT
Start: 2018-03-22 | End: 2018-03-22

## 2018-03-22 RX ORDER — METOPROLOL TARTRATE 5 MG/5ML
5 INJECTION INTRAVENOUS ONCE
Status: DISCONTINUED | OUTPATIENT
Start: 2018-03-22 | End: 2018-03-22

## 2018-03-22 RX ORDER — LEVALBUTEROL INHALATION SOLUTION 0.63 MG/3ML
0.63 SOLUTION RESPIRATORY (INHALATION)
Status: DISCONTINUED | OUTPATIENT
Start: 2018-03-23 | End: 2018-03-22

## 2018-03-22 RX ORDER — METOPROLOL TARTRATE 5 MG/5ML
10 INJECTION INTRAVENOUS ONCE
Status: COMPLETED | OUTPATIENT
Start: 2018-03-22 | End: 2018-03-22

## 2018-03-22 RX ORDER — PRAVASTATIN SODIUM 40 MG
40 TABLET ORAL
Status: DISCONTINUED | OUTPATIENT
Start: 2018-03-23 | End: 2018-03-30 | Stop reason: HOSPADM

## 2018-03-22 RX ORDER — ALBUTEROL SULFATE 2.5 MG/3ML
2.5 SOLUTION RESPIRATORY (INHALATION) EVERY 6 HOURS PRN
Status: DISCONTINUED | OUTPATIENT
Start: 2018-03-22 | End: 2018-03-30 | Stop reason: HOSPADM

## 2018-03-22 RX ORDER — FUROSEMIDE 10 MG/ML
40 INJECTION INTRAMUSCULAR; INTRAVENOUS ONCE
Status: COMPLETED | OUTPATIENT
Start: 2018-03-22 | End: 2018-03-22

## 2018-03-22 RX ORDER — HEPARIN SODIUM 1000 [USP'U]/ML
2000 INJECTION, SOLUTION INTRAVENOUS; SUBCUTANEOUS AS NEEDED
Status: DISCONTINUED | OUTPATIENT
Start: 2018-03-22 | End: 2018-03-29

## 2018-03-22 RX ADMIN — DILTIAZEM HYDROCHLORIDE 2 MG/HR: 5 INJECTION INTRAVENOUS at 20:28

## 2018-03-22 RX ADMIN — SODIUM CHLORIDE 500 ML: 0.9 INJECTION, SOLUTION INTRAVENOUS at 17:13

## 2018-03-22 RX ADMIN — MAGNESIUM SULFATE IN WATER 4 G: 40 INJECTION, SOLUTION INTRAVENOUS at 21:21

## 2018-03-22 RX ADMIN — DILTIAZEM HYDROCHLORIDE 60 MG: 60 TABLET, FILM COATED ORAL at 23:13

## 2018-03-22 RX ADMIN — DILTIAZEM HYDROCHLORIDE 15 MG: 5 INJECTION INTRAVENOUS at 19:13

## 2018-03-22 RX ADMIN — HEPARIN SODIUM AND DEXTROSE 11.8 UNITS/KG/HR: 10000; 5 INJECTION INTRAVENOUS at 19:43

## 2018-03-22 RX ADMIN — Medication 1000 MG: at 23:04

## 2018-03-22 RX ADMIN — LEVALBUTEROL HYDROCHLORIDE 0.63 MG: 0.63 SOLUTION RESPIRATORY (INHALATION) at 19:28

## 2018-03-22 RX ADMIN — METOPROLOL TARTRATE 5 MG: 5 INJECTION, SOLUTION INTRAVENOUS at 18:18

## 2018-03-22 RX ADMIN — HEPARIN SODIUM 4000 UNITS: 1000 INJECTION, SOLUTION INTRAVENOUS; SUBCUTANEOUS at 19:42

## 2018-03-22 RX ADMIN — METOPROLOL TARTRATE 10 MG: 5 INJECTION, SOLUTION INTRAVENOUS at 22:37

## 2018-03-22 RX ADMIN — FUROSEMIDE 40 MG: 10 INJECTION, SOLUTION INTRAMUSCULAR; INTRAVENOUS at 19:34

## 2018-03-22 NOTE — ED PROVIDER NOTES
History  Chief Complaint   Patient presents with    Back Pain     pt has had back pain for at least the past three days  pt lives in a facility and has dementia so it is unclear if there was any injury  pt indicates pain is in the center of her spine     43-year-old female with advanced dementia CAD status post PCI CKD with one kidney presenting with daughter with chief complaint of back pain  Patient is here with her daughter she lives at [facility], and is again a very poor historian she has been complaining intermittently of back pain for the last possibly 1-2 weeks is localized to her right mid thoracic and low back it is worse when she bends twists and moves and goes from a seated to standing position it is nonradiating without other exacerbating or remitting factors a been giving Tylenol to her for this  She was with her daughter today and had complaining all day today about this pain in the daughter brought to the emergency department for evaluation where she was noted to have significant tachycardia, likely atrial fibrillation or atrial flutter0 with rapid ventricular response  No history of this although she does have a history of CAD according to the patient's daughter  Otherwise patient has not had recent fevers no recent falls or injuries no headache chest pain cough shortness of breath she has no radiation into her chest no production or hemoptysis she has no nausea vomiting anorexia abdominal pain change in urine output she has chronic indwelling Davis no new joint pain swelling rashes or other acute infectious complaints  Complete review systems otherwise negative            Prior to Admission Medications   Prescriptions Last Dose Informant Patient Reported? Taking?    Cholecalciferol (VITAMIN D) 2000 units CAPS   Yes No   Sig: Take 1 capsule by mouth daily     LORazepam (ATIVAN) 0 5 mg tablet   Yes No   Sig: Take 0 5 mg by mouth 2 (two) times a day   Levothyroxine Sodium 88 MCG CAPS   Yes No   Sig: Take 88 mcg by mouth every morning     Sennosides-Docusate Sodium (SENNA PLUS PO)   Yes No   Sig: Take by mouth daily as needed   acetaminophen (TYLENOL) 500 mg tablet   Yes No   Sig: Take 500 mg by mouth every 6 (six) hours as needed for mild pain   albuterol (2 5 mg/3 mL) 0 083 % nebulizer solution   Yes No   Sig: Take 2 5 mg by nebulization every 6 (six) hours as needed for wheezing   albuterol (PROVENTIL HFA,VENTOLIN HFA) 90 mcg/act inhaler   Yes No   Sig: Inhale 2 puffs every 6 (six) hours as needed for wheezing   amLODIPine (NORVASC) 10 mg tablet   Yes No   Sig: Take 10 mg by mouth daily   aspirin (ECOTRIN LOW STRENGTH) 81 mg EC tablet   Yes No   Sig: Take 81 mg by mouth daily   buPROPion (WELLBUTRIN SR) 150 mg 12 hr tablet   Yes No   Sig: Take 150 mg by mouth daily   budesonide-formoterol (SYMBICORT) 160-4 5 mcg/act inhaler   Yes No   Sig: Inhale 2 puffs 2 (two) times a day   carBAMazepine (TEGretol) 100 mg chewable tablet   Yes No   Sig: Chew 100 mg 3 (three) times a day   cloNIDine (CATAPRES) 0 1 mg tablet   Yes No   Sig: Take 0 2 mg by mouth daily at bedtime   divalproex sodium (DEPAKOTE ER) 250 mg 24 hr tablet   Yes No   Sig: Take 500 mg by mouth daily   fenofibrate micronized (LOFIBRA) 67 MG capsule   Yes No   Sig: Take 67 mg by mouth every morning before breakfast   ferrous sulfate 325 (65 Fe) mg tablet   Yes No   Sig: Take 325 mg by mouth 2 (two) times a day   ipratropium-albuterol (DUO-NEB) 0 5-2 5 mg/mL   Yes No   Sig: Take 3 mL by nebulization every 6 (six) hours   metoprolol succinate (TOPROL-XL) 50 mg 24 hr tablet   No No   Sig: Take 1 tablet by mouth every morning   simvastatin (ZOCOR) 20 mg tablet   Yes No   Sig: Take 20 mg by mouth daily at bedtime      Facility-Administered Medications: None       Past Medical History:   Diagnosis Date    Brain aneurysm     CAD (coronary artery disease)     Cardiac disease     CHF (congestive heart failure) (HCC)     CKD (chronic kidney disease)     COPD (chronic obstructive pulmonary disease) (HCC)     Dementia     Dementia     Disease of thyroid gland     Hyperlipidemia     Hyperlipidemia     Hypertension     MI (myocardial infarction)     Migraine     Renal disorder     Seizures ([address] )     Stroke [facility])        Past Surgical History:   Procedure Laterality Date    APPENDECTOMY      BLADDER TUMOR EXCISION      BRAIN SURGERY      CARDIAC SURGERY      CORONARY STENT PLACEMENT      5 stents    EYE SURGERY      MANDIBLE FRACTURE SURGERY      TONSILLECTOMY      TUBAL LIGATION      UTERINE FIBROID SURGERY         History reviewed  No pertinent family history  I have reviewed and agree with the history as documented  Social History   Substance Use Topics    Smoking status: Former Smoker    Smokeless tobacco: Never Used    Alcohol use No        Review of Systems   Constitutional: Negative for chills and fever  HENT: Negative for rhinorrhea and sore throat  Eyes: Negative for photophobia and pain  Respiratory: Negative for cough and shortness of breath  Cardiovascular: Negative for chest pain and palpitations  Gastrointestinal: Negative for abdominal pain, diarrhea, nausea and vomiting  Genitourinary: Negative for dysuria, frequency and urgency  Musculoskeletal: Positive for back pain  Negative for arthralgias and myalgias  Skin: Negative for color change and rash  Neurological: Negative for dizziness, weakness and headaches  Hematological: Negative for adenopathy  Does not bruise/bleed easily  Psychiatric/Behavioral: Negative for agitation and behavioral problems  All other systems reviewed and are negative        Physical Exam  ED Triage Vitals [03/22/18 1456]   Temperature Pulse Respirations Blood Pressure SpO2   98 3 °F (36 8 °C) (!) 144 16 138/75 92 %      Temp Source Heart Rate Source Patient Position - Orthostatic VS BP Location FiO2 (%)   Oral Monitor Sitting Left arm --      Pain Score 5           Orthostatic Vital Signs  Vitals:    03/23/18 1900 03/23/18 2000 03/23/18 2100 03/23/18 2107   BP: 113/73 108/64 121/84 121/84   Pulse: (!) 139 (!) 127 (!) 112 (!) 110   Patient Position - Orthostatic VS: Sitting          Physical Exam   Constitutional: She is oriented to person, place, and time  She appears well-developed and well-nourished  No distress  Well appearing pleasant in nad, daughter at bedside   HENT:   Head: Normocephalic and atraumatic  Right Ear: External ear normal    Left Ear: External ear normal    Eyes: EOM are normal  Pupils are equal, round, and reactive to light  Neck: Normal range of motion  Neck supple  No tracheal deviation present  Cardiovascular: Regular rhythm and normal heart sounds  Exam reveals no gallop and no friction rub  No murmur heard  tachycardic   Pulmonary/Chest: Effort normal  She has wheezes  She has no rales  Abdominal: Soft  Bowel sounds are normal  She exhibits no distension  There is no tenderness  There is no rebound and no guarding  Musculoskeletal: Normal range of motion  She exhibits no edema or tenderness  Pt is tender in right lumber and lower thoracic paraspinal musculature, no midline bony tenderness no overlying skin changes, le nvi without other acute ischemic, inflammatory, or infectious findings   Neurological: She is alert and oriented to person, place, and time  No cranial nerve deficit  She exhibits normal muscle tone  Coordination normal    Skin: Skin is warm and dry  No rash noted  Psychiatric: She has a normal mood and affect  Her behavior is normal    Nursing note and vitals reviewed        ED Medications  Medications   diltiazem (CARDIZEM) 125 mg/25 mL injection **AcuDose Override Pull** (  Not Given 3/22/18 2230)   albuterol inhalation solution 5 mg (5 mg Nebulization Not Given 3/22/18 2105)   heparin (porcine) 25,000 units in 250 mL infusion (premix) (9 76 Units/kg/hr × 85 kg (Order-Specific) Intravenous New Bag 3/23/18 1836)   heparin (porcine) injection 4,000 Units (not administered)   heparin (porcine) injection 2,000 Units (2,000 Units Intravenous Given 3/23/18 1830)   aspirin (ECOTRIN LOW STRENGTH) EC tablet 81 mg (81 mg Oral Given 3/23/18 0856)   budesonide-formoterol (SYMBICORT) 160-4 5 mcg/act inhaler 2 puff (2 puffs Inhalation Given 3/23/18 1839)   carBAMazepine (TEGretol) chewable tablet 100 mg (100 mg Oral Given 3/23/18 2105)   divalproex sodium (DEPAKOTE ER) 24 hr tablet 500 mg (500 mg Oral Given 3/23/18 0903)   ferrous sulfate tablet 325 mg (325 mg Oral Given 3/23/18 1827)   pravastatin (PRAVACHOL) tablet 40 mg (40 mg Oral Given 3/23/18 1828)   chlorhexidine (PERIDEX) 0 12 % oral rinse 15 mL (15 mL Swish & Spit Given 3/23/18 2107)   ceftriaxone (ROCEPHIN) 1 g/50 mL in dextrose IVPB (0 mg Intravenous Stopped 3/23/18 2200)   senna (SENOKOT) tablet 8 6 mg (8 6 mg Oral Given 3/23/18 2108)   docusate sodium (COLACE) capsule 100 mg (100 mg Oral Given 3/23/18 1828)   albuterol inhalation solution 2 5 mg (not administered)   diltiazem (CARDIZEM) tablet 60 mg (60 mg Oral Given 3/23/18 1827)   buPROPion (WELLBUTRIN SR) 12 hr tablet 150 mg (150 mg Oral Given 3/23/18 1459)   acetaminophen (TYLENOL) tablet 650 mg (not administered)   butalbital-acetaminophen-caffeine (FIORICET,ESGIC) -40 mg per tablet 1 tablet (1 tablet Oral Given 3/23/18 1846)   metoprolol (LOPRESSOR) injection 5 mg (5 mg Intravenous Given 3/23/18 1831)   metoprolol tartrate (LOPRESSOR) tablet 50 mg (50 mg Oral Given 3/23/18 2107)   melatonin tablet 6 mg (6 mg Oral Given 3/23/18 2100)   sodium chloride 0 9 % bolus 500 mL (0 mL Intravenous Stopped 3/22/18 1929)   metoprolol (LOPRESSOR) injection 5 mg (5 mg Intravenous Given 3/22/18 1818)   diltiazem (CARDIZEM) injection 15 mg (15 mg Intravenous Given 3/22/18 1913)   levalbuterol (XOPENEX) inhalation solution 0 63 mg (0 63 mg Nebulization Given 3/22/18 1928)   heparin (porcine) injection 4,000 Units (4,000 Units Intravenous Given 3/22/18 1942)   furosemide (LASIX) injection 40 mg (40 mg Intravenous Given 3/22/18 1934)   magnesium sulfate 4 g/100 mL IVPB (premix) 4 g (0 g Intravenous Stopped 3/23/18 0100)   metoprolol (LOPRESSOR) injection 10 mg (10 mg Intravenous Given 3/22/18 2237)   furosemide (LASIX) injection 20 mg (20 mg Intravenous Given 3/23/18 0359)   albumin human (FLEXBUMIN) 25 % injection 12 5 g (0 g Intravenous Stopped 3/23/18 0907)   furosemide (LASIX) injection 20 mg (20 mg Intravenous Given 3/23/18 1042)   furosemide (LASIX) injection 40 mg (40 mg Intravenous Given 3/23/18 1438)       Diagnostic Studies  Results Reviewed     Procedure Component Value Units Date/Time    Urine culture [[id number]] Collected:  03/22/18 1925    Lab Status:  Preliminary result Specimen:  Urine from Urine, Indwelling Davis Catheter Updated:  03/23/18 1718     Urine Culture Culture results to follow  Influenza A/B and RSV by PCR (Indicated for patients > 2 mo of age) [[id number]]  (Abnormal) Collected:  03/22/18 2256    Lab Status:  Final result Specimen:  Nasopharyngeal from Nasopharyngeal Swab Updated:  03/23/18 1151     INFLU A PCR None Detected     INFLU B PCR None Detected     RSV PCR Detected (A)    Troponin I [[id number]]  (Normal) Collected:  03/22/18 2023    Lab Status:  Final result Specimen:  Blood from Arm, Right Updated:  03/22/18 2046     Troponin I <0 02 ng/mL     Narrative:         Siemens Chemistry analyzer 99% cutoff is > 0 04 ng/mL in network labs    o cTnI 99% cutoff is useful only when applied to patients in the clinical setting of myocardial ischemia  o cTnI 99% cutoff should be interpreted in the context of clinical history, ECG findings and possibly cardiac imaging to establish correct diagnosis  o cTnI 99% cutoff may be suggestive but clearly not indicative of a coronary event without the clinical setting of myocardial ischemia      Urine Microscopic [[id number]]  (Abnormal) Collected:  03/22/18 1925 Lab Status:  Final result Specimen:  Urine from Urine, Indwelling Davis Catheter Updated:  03/22/18 2003     RBC, UA 10-20 (A) /hpf      WBC, UA 10-20 (A) /hpf      Epithelial Cells Occasional /hpf      Bacteria, UA Innumerable (A) /hpf      AMORPH URATES Moderate /hpf     UA w Reflex to Microscopic w Reflex to Culture [[id number]]  (Abnormal) Collected:  03/22/18 1925    Lab Status:  Final result Specimen:  Urine from Urine, Indwelling Davis Catheter Updated:  03/22/18 1932     Color, UA Yellow     Clarity, UA Cloudy     Specific [place], UA 1 020     pH, UA 6 5     Leukocytes, UA Moderate (A)     Nitrite, UA Positive (A)     Protein, UA 30 (1+) (A) mg/dl      Glucose, UA Negative mg/dl      Ketones, UA Negative mg/dl      Urobilinogen, UA 0 2 E U /dl      Bilirubin, UA Negative     Blood, UA Large (A)    TSH, 3rd generation with T4 reflex [[id number]]  (Normal) Collected:  03/22/18 1809    Lab Status:  Final result Specimen:  Blood from Arm, Left Updated:  03/22/18 1843     TSH 3RD GENERATON 1 422 uIU/mL     Narrative:         Patients undergoing fluorescein dye angiography may retain small amounts of fluorescein in the body for 48-72 hours post procedure  Samples containing fluorescein can produce falsely depressed TSH values  If the patient had this procedure,a specimen should be resubmitted post fluorescein clearance            The recommended reference ranges for TSH during pregnancy are as follows:  First trimester 0 1 to 2 5 uIU/mL  Second trimester  0 2 to 3 0 uIU/mL  Third trimester 0 3 to 3 0 uIU/m      B-type natriuretic peptide [[id number]]  (Abnormal) Collected:  03/22/18 1809    Lab Status:  Final result Specimen:  Blood from Arm, Left Updated:  03/22/18 1843     NT-proBNP 9,236 (H) pg/mL     Magnesium [[id number]]  (Normal) Collected:  03/22/18 1809    Lab Status:  Final result Specimen:  Blood from Arm, Left Updated:  03/22/18 1843     Magnesium 2 1 mg/dL     Comprehensive metabolic panel [[id number]] (Abnormal) Collected:  03/22/18 1809    Lab Status:  Final result Specimen:  Blood from Arm, Left Updated:  03/22/18 1835     Sodium 145 mmol/L      Potassium 3 9 mmol/L      Chloride 108 mmol/L      CO2 28 mmol/L      Anion Gap 9 mmol/L      BUN 26 (H) mg/dL      Creatinine 1 49 (H) mg/dL      Glucose 110 mg/dL      Calcium 9 2 mg/dL      AST 17 U/L      ALT 26 U/L      Alkaline Phosphatase 52 U/L      Total Protein 7 1 g/dL      Albumin 3 4 (L) g/dL      Total Bilirubin 0 30 mg/dL      eGFR 35 ml/min/1 73sq m     Narrative:         National Kidney Disease Education Program recommendations are as follows:  GFR calculation is accurate only with a steady state creatinine  Chronic Kidney disease less than 60 ml/min/1 73 sq  meters  Kidney failure less than 15 ml/min/1 73 sq  meters  Troponin I [[id number]]  (Normal) Collected:  03/22/18 1710    Lab Status:  Final result Specimen:  Blood from Arm, Right Updated:  03/22/18 1738     Troponin I <0 02 ng/mL     Narrative:         S[employer]ens Chemistry analyzer 99% cutoff is > 0 04 ng/mL in network labs    o cTnI 99% cutoff is useful only when applied to patients in the clinical setting of myocardial ischemia  o cTnI 99% cutoff should be interpreted in the context of clinical history, ECG findings and possibly cardiac imaging to establish correct diagnosis  o cTnI 99% cutoff may be suggestive but clearly not indicative of a coronary event without the clinical setting of myocardial ischemia  Protime-INR [[id number]]  (Abnormal) Collected:  03/22/18 1710    Lab Status:  Final result Specimen:  Blood from Arm, Right Updated:  03/22/18 1736     Protime 15 4 (H) seconds      INR 1 19 (H)    APTT [[id number]]  (Normal) Collected:  03/22/18 1710    Lab Status:  Final result Specimen:  Blood from Arm, Right Updated:  03/22/18 1736     PTT 30 seconds     Narrative:          Therapeutic Heparin Range = 60-90 seconds    CBC and differential [[id number]]  (Abnormal) Collected:  03/22/18 1710 Lab Status:  Final result Specimen:  Blood from Arm, Right Updated:  03/22/18 1719     WBC 6 30 Thousand/uL      RBC 3 75 (L) Million/uL      Hemoglobin 12 4 g/dL      Hematocrit 38 7 %       (H) fL      MCH 33 1 pg      MCHC 32 0 g/dL      RDW 12 7 %      MPV 12 1 fL      Platelets 366 Thousands/uL      nRBC 0 /100 WBCs      Neutrophils Relative 54 %      Lymphocytes Relative 29 %      Monocytes Relative 11 %      Eosinophils Relative 4 %      Basophils Relative 1 %      Neutrophils Absolute 3 40 Thousands/µL      Lymphocytes Absolute 1 85 Thousands/µL      Monocytes Absolute 0 71 Thousand/µL      Eosinophils Absolute 0 27 Thousand/µL      Basophils Absolute 0 03 Thousands/µL                  XR chest portable ICU   Final Result by Nayely Gallagher MD (03/23 0416)      Coarsened lung markings appear chronic, though component of mild interstitial edema or viral lower respiratory infection is possible  Otherwise, no acute cardiopulmonary findings  Workstation performed: [id number]         CT chest abdomen pelvis wo contrast   Final Result by Rosie Thompson MD (03/22 1912)      Stable fat-containing ventral hernia  Stable gallstones without surrounding inflammation  Stable cardiomegaly  Stable small hiatal hernia           Workstation performed: [id number]                    Procedures  Procedures       Phone Contacts  ED Phone Contact    ED Course  ED Course as of Mar 23 2238   u Mar 22, 2018   1715 EKG reviewed narrow complex tachycardia, atrial fibrillation versus atrial flutter with variable block normal access nonspecific ST segment changes    1916 Patient acutely worse, significantly tachypneic, hypoxic appears uncomfortable with her work of breathing, diminished breath sounds, wheezes, she does not appear to have flash pulmonary edema, will place on BiPAP now, rate control, heparin, will give sublingual nitro after received Cardizem    1936 Nitrite, UA: (!) Positive 1936 Chronic indwelling Davis    1936 PatientOn BiPAP received Cardizem Lasix heparin, Xopenex significantly improved, patient family agreeable pension    2020 Patient seen and re-evaluated multiple prime significantly improved, admission to ICU pending                                MDM  Number of Diagnoses or Management Options  Acute respiratory failure [facility]):   Back pain:   Hypoxia:   New onset atrial flutter [facility]):   Diagnosis management comments: 70 yo female with hx of ckd 3, copd, cad dementia with 1-2 week of reproducible right sided back pain without sob, n/v anorexia or urinary sx, noted be in new onset afib/ flutter, on exam pleasant in nad, diminished breath sounds with wheezes, abdomen soft, nt nd, catheter in place, reproducible back pain, le NVI without pain or swelling or calf tenderness, no rf for dvt or pe, given age, tachycardia, will perform cardiac w/u, urine CT CAP w/o as pt is poor historian to r/o pna, kidney stone collection bony lesion, aaa, etc, likely require admission for rate control, anticoag, dispo pending further eval and discussion with family    The patient presented with a condition in which there was a high probability of imminent or life-threatening deterioration, and critical care services (excluding separately billable procedures) totalled  minutes          Disposition  Final diagnoses:   Acute respiratory failure ([address] )   Hypoxia   New onset atrial flutter (HCC)   Back pain     Time reflects when diagnosis was documented in both MDM as applicable and the Disposition within this note     Time User Action Codes Description Comment    3/22/2018  7:37 PM Bobby ROJO Add [J96 00] Acute respiratory failure ([address] )     3/22/2018  7:38 PM Rondall Collar [R09 02] Hypoxia     3/22/2018  7:38 PM Rondall Collar [I48 92] New onset atrial flutter ([address] )     3/22/2018  7:38 PM Chilango Krueger Add [M54 9] Back pain       ED Disposition     ED Disposition Condition Comment Admit  Case was discussed with [facility] ICU and the patient's admission status was agreed to be Admission Status: inpatient status to the service of   [facility] ICU           Follow-up Information    None       Current Discharge Medication List      CONTINUE these medications which have NOT CHANGED    Details   acetaminophen (TYLENOL) 500 mg tablet Take 500 mg by mouth every 6 (six) hours as needed for mild pain      albuterol (2 5 mg/3 mL) 0 083 % nebulizer solution Take 2 5 mg by nebulization every 6 (six) hours as needed for wheezing      albuterol (PROVENTIL HFA,VENTOLIN HFA) 90 mcg/act inhaler Inhale 2 puffs every 6 (six) hours as needed for wheezing      amLODIPine (NORVASC) 10 mg tablet Take 10 mg by mouth daily      aspirin (ECOTRIN LOW STRENGTH) 81 mg EC tablet Take 81 mg by mouth daily      budesonide-formoterol (SYMBICORT) 160-4 5 mcg/act inhaler Inhale 2 puffs 2 (two) times a day      buPROPion (WELLBUTRIN SR) 150 mg 12 hr tablet Take 150 mg by mouth daily      carBAMazepine (TEGretol) 100 mg chewable tablet Chew 100 mg 3 (three) times a day      Cholecalciferol (VITAMIN D) 2000 units CAPS Take 1 capsule by mouth daily        cloNIDine (CATAPRES) 0 1 mg tablet Take 0 2 mg by mouth daily at bedtime      divalproex sodium (DEPAKOTE ER) 250 mg 24 hr tablet Take 500 mg by mouth daily      fenofibrate micronized (LOFIBRA) 67 MG capsule Take 67 mg by mouth every morning before breakfast      ferrous sulfate 325 (65 Fe) mg tablet Take 325 mg by mouth 2 (two) times a day      ipratropium-albuterol (DUO-NEB) 0 5-2 5 mg/mL Take 3 mL by nebulization every 6 (six) hours      Levothyroxine Sodium 88 MCG CAPS Take 88 mcg by mouth every morning        LORazepam (ATIVAN) 0 5 mg tablet Take 0 5 mg by mouth 2 (two) times a day      metoprolol succinate (TOPROL-XL) 50 mg 24 hr tablet Take 1 tablet by mouth every morning  Qty: 30 tablet, Refills: 1      Sennosides-Docusate Sodium (SENNA PLUS PO) Take by mouth daily as needed simvastatin (ZOCOR) 20 mg tablet Take 20 mg by mouth daily at bedtime           No discharge procedures on file      ED Provider  Electronically Signed by           Jocelin Avalos DO  03/23/18 9529

## 2018-03-23 ENCOUNTER — APPOINTMENT (INPATIENT)
Dept: RADIOLOGY | Facility: HOSPITAL | Age: 73
DRG: 308 | End: 2018-03-23
Payer: MEDICARE

## 2018-03-23 PROBLEM — J96.00 ACUTE RESPIRATORY FAILURE (HCC): Status: RESOLVED | Noted: 2018-03-22 | Resolved: 2018-03-23

## 2018-03-23 PROBLEM — E78.5 HLD (HYPERLIPIDEMIA): Status: ACTIVE | Noted: 2018-03-23

## 2018-03-23 PROBLEM — F03.90 DEMENTIA (HCC): Status: ACTIVE | Noted: 2018-03-23

## 2018-03-23 LAB
ANION GAP SERPL CALCULATED.3IONS-SCNC: 10 MMOL/L (ref 4–13)
APTT PPP: 49 SECONDS (ref 23–35)
APTT PPP: 73 SECONDS (ref 23–35)
APTT PPP: 91 SECONDS (ref 23–35)
APTT PPP: 95 SECONDS (ref 23–35)
BASOPHILS # BLD AUTO: 0.05 THOUSANDS/ΜL (ref 0–0.1)
BASOPHILS NFR BLD AUTO: 1 % (ref 0–1)
BUN SERPL-MCNC: 27 MG/DL (ref 5–25)
CALCIUM SERPL-MCNC: 9.2 MG/DL (ref 8.3–10.1)
CHLORIDE SERPL-SCNC: 105 MMOL/L (ref 100–108)
CO2 SERPL-SCNC: 30 MMOL/L (ref 21–32)
CREAT SERPL-MCNC: 1.63 MG/DL (ref 0.6–1.3)
EOSINOPHIL # BLD AUTO: 0.26 THOUSAND/ΜL (ref 0–0.61)
EOSINOPHIL NFR BLD AUTO: 3 % (ref 0–6)
ERYTHROCYTE [DISTWIDTH] IN BLOOD BY AUTOMATED COUNT: 12.7 % (ref 11.6–15.1)
FLUAV AG SPEC QL: ABNORMAL
FLUBV AG SPEC QL: ABNORMAL
GFR SERPL CREATININE-BSD FRML MDRD: 31 ML/MIN/1.73SQ M
GLUCOSE SERPL-MCNC: 128 MG/DL (ref 65–140)
HCT VFR BLD AUTO: 40.4 % (ref 34.8–46.1)
HGB BLD-MCNC: 13 G/DL (ref 11.5–15.4)
LYMPHOCYTES # BLD AUTO: 1.95 THOUSANDS/ΜL (ref 0.6–4.47)
LYMPHOCYTES NFR BLD AUTO: 22 % (ref 14–44)
MAGNESIUM SERPL-MCNC: 3.4 MG/DL (ref 1.6–2.6)
MCH RBC QN AUTO: 33.2 PG (ref 26.8–34.3)
MCHC RBC AUTO-ENTMCNC: 32.2 G/DL (ref 31.4–37.4)
MCV RBC AUTO: 103 FL (ref 82–98)
MONOCYTES # BLD AUTO: 1.07 THOUSAND/ΜL (ref 0.17–1.22)
MONOCYTES NFR BLD AUTO: 12 % (ref 4–12)
NEUTROPHILS # BLD AUTO: 5.63 THOUSANDS/ΜL (ref 1.85–7.62)
NEUTS SEG NFR BLD AUTO: 63 % (ref 43–75)
NRBC BLD AUTO-RTO: 0 /100 WBCS
PHOSPHATE SERPL-MCNC: 4.3 MG/DL (ref 2.3–4.1)
PLATELET # BLD AUTO: 138 THOUSANDS/UL (ref 149–390)
PMV BLD AUTO: 12.3 FL (ref 8.9–12.7)
POTASSIUM SERPL-SCNC: 4.2 MMOL/L (ref 3.5–5.3)
RBC # BLD AUTO: 3.91 MILLION/UL (ref 3.81–5.12)
RSV B RNA SPEC QL NAA+PROBE: DETECTED
SODIUM SERPL-SCNC: 145 MMOL/L (ref 136–145)
WBC # BLD AUTO: 9 THOUSAND/UL (ref 4.31–10.16)

## 2018-03-23 PROCEDURE — 71045 X-RAY EXAM CHEST 1 VIEW: CPT

## 2018-03-23 PROCEDURE — 85730 THROMBOPLASTIN TIME PARTIAL: CPT | Performed by: PHYSICIAN ASSISTANT

## 2018-03-23 PROCEDURE — 85025 COMPLETE CBC W/AUTO DIFF WBC: CPT | Performed by: PHYSICIAN ASSISTANT

## 2018-03-23 PROCEDURE — 84100 ASSAY OF PHOSPHORUS: CPT | Performed by: PHYSICIAN ASSISTANT

## 2018-03-23 PROCEDURE — 80048 BASIC METABOLIC PNL TOTAL CA: CPT | Performed by: PHYSICIAN ASSISTANT

## 2018-03-23 PROCEDURE — 83735 ASSAY OF MAGNESIUM: CPT | Performed by: PHYSICIAN ASSISTANT

## 2018-03-23 PROCEDURE — 85730 THROMBOPLASTIN TIME PARTIAL: CPT | Performed by: ANESTHESIOLOGY

## 2018-03-23 RX ORDER — FUROSEMIDE 10 MG/ML
40 INJECTION INTRAMUSCULAR; INTRAVENOUS ONCE
Status: COMPLETED | OUTPATIENT
Start: 2018-03-23 | End: 2018-03-23

## 2018-03-23 RX ORDER — METOPROLOL TARTRATE 50 MG/1
50 TABLET, FILM COATED ORAL EVERY 12 HOURS SCHEDULED
Status: DISCONTINUED | OUTPATIENT
Start: 2018-03-23 | End: 2018-03-30 | Stop reason: HOSPADM

## 2018-03-23 RX ORDER — ALBUMIN (HUMAN) 12.5 G/50ML
12.5 SOLUTION INTRAVENOUS ONCE
Status: COMPLETED | OUTPATIENT
Start: 2018-03-23 | End: 2018-03-23

## 2018-03-23 RX ORDER — FUROSEMIDE 10 MG/ML
20 INJECTION INTRAMUSCULAR; INTRAVENOUS ONCE
Status: COMPLETED | OUTPATIENT
Start: 2018-03-23 | End: 2018-03-23

## 2018-03-23 RX ORDER — METOPROLOL TARTRATE 5 MG/5ML
5 INJECTION INTRAVENOUS EVERY 6 HOURS PRN
Status: DISCONTINUED | OUTPATIENT
Start: 2018-03-23 | End: 2018-03-30 | Stop reason: HOSPADM

## 2018-03-23 RX ORDER — BUPROPION HYDROCHLORIDE 150 MG/1
150 TABLET, EXTENDED RELEASE ORAL DAILY
Status: DISCONTINUED | OUTPATIENT
Start: 2018-03-23 | End: 2018-03-30 | Stop reason: HOSPADM

## 2018-03-23 RX ORDER — ACETAMINOPHEN 325 MG/1
650 TABLET ORAL EVERY 6 HOURS PRN
Status: DISCONTINUED | OUTPATIENT
Start: 2018-03-23 | End: 2018-03-30 | Stop reason: HOSPADM

## 2018-03-23 RX ORDER — LANOLIN ALCOHOL/MO/W.PET/CERES
6 CREAM (GRAM) TOPICAL
Status: DISCONTINUED | OUTPATIENT
Start: 2018-03-23 | End: 2018-03-30 | Stop reason: HOSPADM

## 2018-03-23 RX ORDER — BUTALBITAL, ACETAMINOPHEN AND CAFFEINE 50; 325; 40 MG/1; MG/1; MG/1
1 TABLET ORAL EVERY 4 HOURS PRN
Status: DISCONTINUED | OUTPATIENT
Start: 2018-03-23 | End: 2018-03-30 | Stop reason: HOSPADM

## 2018-03-23 RX ORDER — FUROSEMIDE 10 MG/ML
40 INJECTION INTRAMUSCULAR; INTRAVENOUS ONCE
Status: DISCONTINUED | OUTPATIENT
Start: 2018-03-23 | End: 2018-03-23

## 2018-03-23 RX ADMIN — SENNOSIDES 8.6 MG: 8.6 TABLET, FILM COATED ORAL at 21:08

## 2018-03-23 RX ADMIN — CHLORHEXIDINE GLUCONATE 15 ML: 1.2 RINSE ORAL at 21:07

## 2018-03-23 RX ADMIN — DOCUSATE SODIUM 100 MG: 100 CAPSULE, LIQUID FILLED ORAL at 08:55

## 2018-03-23 RX ADMIN — HEPARIN SODIUM 2000 UNITS: 1000 INJECTION, SOLUTION INTRAVENOUS; SUBCUTANEOUS at 18:30

## 2018-03-23 RX ADMIN — BUTALBITAL, ACETAMINOPHEN AND CAFFEINE 1 TABLET: 50; 325; 40 TABLET ORAL at 12:36

## 2018-03-23 RX ADMIN — BUDESONIDE AND FORMOTEROL FUMARATE DIHYDRATE 2 PUFF: 160; 4.5 AEROSOL RESPIRATORY (INHALATION) at 18:39

## 2018-03-23 RX ADMIN — MELATONIN TAB 3 MG 6 MG: 3 TAB at 21:00

## 2018-03-23 RX ADMIN — Medication 325 MG: at 08:56

## 2018-03-23 RX ADMIN — METOPROLOL TARTRATE 5 MG: 5 INJECTION, SOLUTION INTRAVENOUS at 14:42

## 2018-03-23 RX ADMIN — BUPROPION HYDROCHLORIDE 150 MG: 150 TABLET, FILM COATED, EXTENDED RELEASE ORAL at 14:59

## 2018-03-23 RX ADMIN — Medication 325 MG: at 18:27

## 2018-03-23 RX ADMIN — METOPROLOL TARTRATE 25 MG: 25 TABLET ORAL at 10:44

## 2018-03-23 RX ADMIN — PRAVASTATIN SODIUM 40 MG: 40 TABLET ORAL at 18:28

## 2018-03-23 RX ADMIN — Medication 1000 MG: at 21:05

## 2018-03-23 RX ADMIN — ALBUMIN HUMAN 12.5 G: 0.25 SOLUTION INTRAVENOUS at 08:49

## 2018-03-23 RX ADMIN — CARBAMAZEPINE 100 MG: 100 TABLET, CHEWABLE ORAL at 09:04

## 2018-03-23 RX ADMIN — DOCUSATE SODIUM 100 MG: 100 CAPSULE, LIQUID FILLED ORAL at 18:28

## 2018-03-23 RX ADMIN — DILTIAZEM HYDROCHLORIDE 60 MG: 60 TABLET, FILM COATED ORAL at 18:27

## 2018-03-23 RX ADMIN — BUTALBITAL, ACETAMINOPHEN AND CAFFEINE 1 TABLET: 50; 325; 40 TABLET ORAL at 18:46

## 2018-03-23 RX ADMIN — FUROSEMIDE 20 MG: 10 INJECTION, SOLUTION INTRAMUSCULAR; INTRAVENOUS at 10:42

## 2018-03-23 RX ADMIN — DILTIAZEM HYDROCHLORIDE 60 MG: 60 TABLET, FILM COATED ORAL at 12:36

## 2018-03-23 RX ADMIN — CHLORHEXIDINE GLUCONATE 15 ML: 1.2 RINSE ORAL at 08:56

## 2018-03-23 RX ADMIN — FUROSEMIDE 20 MG: 10 INJECTION, SOLUTION INTRAMUSCULAR; INTRAVENOUS at 03:59

## 2018-03-23 RX ADMIN — DILTIAZEM HYDROCHLORIDE 60 MG: 60 TABLET, FILM COATED ORAL at 05:26

## 2018-03-23 RX ADMIN — CARBAMAZEPINE 100 MG: 100 TABLET, CHEWABLE ORAL at 21:05

## 2018-03-23 RX ADMIN — ASPIRIN 81 MG: 81 TABLET, COATED ORAL at 08:56

## 2018-03-23 RX ADMIN — METOPROLOL TARTRATE 50 MG: 50 TABLET ORAL at 21:07

## 2018-03-23 RX ADMIN — METOPROLOL TARTRATE 5 MG: 5 INJECTION, SOLUTION INTRAVENOUS at 18:31

## 2018-03-23 RX ADMIN — FUROSEMIDE 40 MG: 10 INJECTION, SOLUTION INTRAMUSCULAR; INTRAVENOUS at 14:38

## 2018-03-23 RX ADMIN — BUDESONIDE AND FORMOTEROL FUMARATE DIHYDRATE 2 PUFF: 160; 4.5 AEROSOL RESPIRATORY (INHALATION) at 09:02

## 2018-03-23 RX ADMIN — DIVALPROEX SODIUM 500 MG: 500 TABLET, EXTENDED RELEASE ORAL at 09:03

## 2018-03-23 RX ADMIN — CARBAMAZEPINE 100 MG: 100 TABLET, CHEWABLE ORAL at 15:04

## 2018-03-23 RX ADMIN — HEPARIN SODIUM AND DEXTROSE 9.76 UNITS/KG/HR: 10000; 5 INJECTION INTRAVENOUS at 18:36

## 2018-03-23 NOTE — RESPIRATORY THERAPY NOTE
RT Protocol Note  Kirby Connolly 71 y o  female MRN: [id number]  Unit/Bed#: ED 19 Encounter: [id number]    Assessment        Home Pulmonary Medications:  Albuterol inhaler  Albuterol Nebulizer    Past Medical History:   Diagnosis Date    Brain aneurysm     Cardiac disease     CHF (congestive heart failure) (HCC)     COPD (chronic obstructive pulmonary disease) (HCC)     Dementia     Disease of thyroid gland     Hyperlipidemia     Hypertension     MI (myocardial infarction)     Migraine     Renal disorder     [facility] [address]     Stroke ([facility] )      Social History     Social History    Marital status:      Spouse name: N/A    Number of children: N/A    Years of education: N/A     Social History Main Topics    Smoking status: Former Smoker    Smokeless tobacco: Never Used    Alcohol use No    Drug use: No    Sexual activity: Not Asked     Other Topics Concern    None     Social History Narrative    None       Subjective    Subjective Data: SOB    Objective    Physical Exam:   Assessment Type: During-treatment  General Appearance: Alert, Awake  Respiratory Pattern: Labored, Accessory muscle use, Dyspnea with exertion, Dyspnea at rest, Shallow, Tachypneic, Symmetrical, Assisted (Assisted on bipap)  Chest Assessment: Chest expansion symmetrical, Trachea midline  Bilateral Breath Sounds: Rhonchi, Expiratory wheezes, Coarse, Diminished  Cough: Non-productive, Weak  O2 Device: Bipap at this time beofre NRB    Vitals:  Blood pressure (!) 176/119, pulse (!) 114, temperature 98 3 °F (36 8 °C), temperature source Oral, resp  rate (!) 30, weight 85 7 kg (189 lb), SpO2 95 %  Imaging and other studies: I have personally reviewed pertinent reports  O2 Device: Bipap at this time beofre NRB     Plan    Respiratory Plan: Moderate/Severe Distress pathway        Resp Comments: Pt placed on Bipap for Resp distress and SOB  TX given through bipap at this time  Pt more comfortable on bipap   With HX of COPD and CHF  A-fib present now in ED and admitted to ICU  Will Continue to monitor  Chief complaint in ED was Back-pain

## 2018-03-23 NOTE — PROGRESS NOTES
Progress Note - Critical Care   Yue Painting 71 y o  female MRN: [id number]  Unit/Bed#:  Encounter: [id number]    Assessment:   Principal Problem:    Acute on chronic systolic CHF (congestive heart failure) (HCC)  Active Problems:    New onset atrial flutter (HCC)    HTN (hypertension)    COPD (chronic obstructive pulmonary disease) (HCC)    HLD (hyperlipidemia)    Dementia    Back pain  Resolved Problems:    Acute respiratory failure (HCC)    Plan:   Neuro:   · History of seizure continue home carbapine 100 mg t i d /Depakote 500 mg ER daily  · Dementia; high risk acute delirium  Frequent reorientation  CV:   · Acute on chronic CHF exacerbation  Continue diuresis goal fluid balance 1-1 5 L/24 hours  Strict I&Os  Daily weights  Low-sodium diet  Check echo  · New onset a flutter  Titrate Cardizem gtt to off  Cardizem 60 mg p o  q 6 hours  On heparin gtt; Alma Delia Vasc 6; 9 7% risk of CVA per year  Given underlying dementia patient would be at best a candidate for Coumadin  Goal INR 2 0-3 0   · Hypertension:  Hold antihypertensive while titrating rate-controlling agents  · Hyperlipidemia:  Pravastatin 40 mg daily  · CAD:  ASA, statin  Lung:   · Acute hypoxic respiratory failure in setting of CHF exacerbation  · BiPAP titrated to supplemental O2 overnight maintain O2 saturations greater than 88%  · Rule out flu  · COPD without exacerbation continue home Spiriva  · Encourage good pulmonary toilet/incentive spirometry  · Respiratory protocol  GI:   · Cardiac diet  · Ppx: Senna/Colace  FEN:   · IV fluid restriction  · Monitor electrolytes replete as warranted Mg > 2 0  K >4 0  :   · CKD status post right nephrectomy  Chronic indwelling Davis secondary to neurogenic bladder  Strict I&Os  Trend BUN/creatinine  ID:   · Trend fever curve and WBC count  · Flu pending  · UTI POA-UA revealing positive leukocyte/positive nitrites  Culture pending    Will cover based on previous sensitivities while urine culture pending  Antibiotic day 2 of ceftriaxone  Heme:   · H&H and platelets stable  Continue trend and monitor  · ppx VTE: heparin gtt  Endo:   · TSH within normal limits  Continue home levothyroxine  · Monitor glucose on daily BMPs  Msk/Skin:   · Frequent offloading repositioning to avoid skin breakdown  · PT/OT  Disposition:   · Transfer to med surg once off diltiazem gtt    ______________________________________________________________________  Chief Complaint:       HPI/24hr events:     Presented to ED for evaluation of back pain; found to be in a flutter with RVR  Receive metoprolol 5 mg without improvement  Developed episode of acute hypoxic respiratory failure requiring BiPaP  Patient received 40 mg of IV Lasix, 15mg diltiazem was placed on diltiazem gtt upon arrival toupon arrival to ICU patient titrated off of BiPAP to nasal cannula  She received 10 mg IV of metoprolol without improvement in heart rate  Diltiazem drip was continued with the initiation of p  o 60 mg PO  Patient intermittently on and off diltiazem gtt overnight  She received an additional dose of 20 mg IV Lasix  ______________________________________________________________________  Physical Exam:     Physical Exam   Constitutional: She is oriented to person, place, and time  She appears well-developed and well-nourished  No distress  Eyes: EOM are normal  Pupils are equal, round, and reactive to light  Neck: Normal range of motion  Neck supple  Cardiovascular:   Irregularly irregular   Pulmonary/Chest: Effort normal  No respiratory distress  She has no wheezes  Fine crackles by basilar crackles  Abdominal: Soft  Bowel sounds are normal  She exhibits no distension  There is no tenderness  There is no rebound and no guarding  Musculoskeletal: Normal range of motion  She exhibits edema  1+ pitting edema bilaterally  Neurological: She is alert and oriented to person, place, and time  No cranial nerve deficit  Oriented to person and place  Skin: Skin is warm  No erythema      ______________________________________________________________________  Temperature:   Temp (24hrs), Av °F (36 7 °C), Min:97 6 °F (36 4 °C), Max:98 3 °F (36 8 °C)    Current Temperature: 97 6 °F (36 4 °C)    Vitals:    18 2230 18 2258 18 2300 18 2313   BP: 119/89  123/79 123/79   BP Location:       Pulse: (!) 137  (!) 133    Resp: (!) 28  (!) 24    Temp:       TempSrc:       SpO2: 98% 92% 93%    Weight:       Height:                  Weights:   IBW: 45 5 kg    Body mass index is 37 24 kg/m²  Weight (last 2 days)     Date/Time   Weight    18  86 5 (190 7)    18 191  85 7 (189)            Height: 5' (152 4 cm)    Intake and Outputs:  I/O        0701 -  0700  07 -  0700    I V  (mL/kg)  57 9 (0 7)    IV Piggyback  600    Total Intake(mL/kg)  657 9 (7 6)    Urine (mL/kg/hr)  1500    Total Output   1500    Net   -842 1           Nutrition:        Diet Orders            Start     Ordered    18  Diet NPO; Sips with meds  Diet effective now     Question Answer Comment   Diet Type NPO    NPO Except: Sips with meds    RD to adjust diet per protocol?  No        18 2210        Labs:     Results from last 7 days  Lab Units 18  1710   WBC Thousand/uL 6 30   HEMOGLOBIN g/dL 12 4   HEMATOCRIT % 38 7   PLATELETS Thousands/uL 149   NEUTROS PCT % 54   MONOS PCT % 11        Results from last 7 days  Lab Units 18  1809   SODIUM mmol/L 145   POTASSIUM mmol/L 3 9   CHLORIDE mmol/L 108   CO2 mmol/L 28   BUN mg/dL 26*   CREATININE mg/dL 1 49*   CALCIUM mg/dL 9 2   TOTAL PROTEIN g/dL 7 1   BILIRUBIN TOTAL mg/dL 0 30   ALK PHOS U/L 52   ALT U/L 26   AST U/L 17   GLUCOSE RANDOM mg/dL 110       Results from last 7 days  Lab Units 18  1809   MAGNESIUM mg/dL 2 1            Results from last 7 days  Lab Units 18  0207 18  1710   INR   --  1 19*   PTT seconds 91* 30 0  Lab Value Date/Time   TROPONINI <0 02 03/22/2018 2023   TROPONINI <0 02 03/22/2018 1710   TROPONINI 0 11 (H) 12/11/2017 0320   TROPONINI 0 11 (H) 12/11/2017 0104   TROPONINI 0 08 (H) 12/10/2017 2123   TROPONINI <0 02 11/21/2017 1748   TROPONINI <0 02 11/21/2017 1605     Imaging:  I have personally reviewed pertinent reports  EKG:   Micro:  Blood Culture:   Lab Results   Component Value Date    BLOODCX No Growth After 5 Days  12/10/2017    BLOODCX No Growth After 5 Days  12/10/2017     Urine Culture:   Lab Results   Component Value Date    URINECX >100,000 cfu/ml Citrobacter freundii (A) 12/10/2017    URINECX 50,000-59,000 cfu/ml Providencia rettgeri (A) 12/10/2017     Sputum Culture: No components found for: SPUTUMCX  Wound Culure: No results found for: WOUNDCULT    Lab Results   Component Value Date    BLOODCX No Growth After 5 Days  12/10/2017    BLOODCX No Growth After 5 Days  12/10/2017    URINECX >100,000 cfu/ml Citrobacter freundii (A) 12/10/2017    URINECX 50,000-59,000 cfu/ml Providencia rettgeri (A) 12/10/2017     Allergies:    Allergies   Allergen Reactions    Ramipril Anaphylaxis    Spiriva Handihaler [Tiotropium Bromide Monohydrate] Swelling    Penicillins      Medications:   Scheduled Meds:    Current Facility-Administered Medications:  albuterol 2 5 mg Nebulization Q6H PRN Jannette Cannon PA-C    albuterol 5 mg Nebulization Once Neel DARREL Herbert DO    aspirin 81 mg Oral Daily Jannette Cannon PA-C    budesonide-formoterol 2 puff Inhalation BID Jannette Cannon PA-C    buPROPion 150 mg Oral Daily Jannette Cannon PA-C    carBAMazepine 100 mg Oral TID Jannette Cannon PA-C    cefTRIAXone 1,000 mg Intravenous Q24H Jannette Cannon PA-C Last Rate: 1,000 mg (03/22/18 2304)   chlorhexidine 15 mL Swish & Spit Q12H [address] Jannette Cannon PA-C    diltiazem 1-15 mg/hr Intravenous Titrated Tomy Herbert DO Last Rate: Stopped (03/23/18 0205)   diltiazem        diltiazem 60 mg Oral Q6H [address] Jannette Cannon PA-C divalproex sodium 500 mg Oral Daily MORENA Negron-C    docusate sodium 100 mg Oral BID Willielyhuy Eye, PA-C    ferrous sulfate 325 mg Oral BID Monicorlyhuy Eye, PA-C    heparin (porcine) 3-20 Units/kg/hr (Order-Specific) Intravenous Titrated Melonyalvino Stock,  Last Rate: 11 8 Units/kg/hr (03/22/18 1943)   heparin (porcine) 2,000 Units Intravenous PRN Neel W Zwiebel, DO    heparin (porcine) 4,000 Units Intravenous PRN Neel W Zwiebel, DO    pravastatin 40 mg Oral Daily With [employer]    senna 1 tablet Oral HS Katerin Hankins PA-C      Continuous Infusions:    diltiazem 1-15 mg/hr Last Rate: Stopped (03/23/18 0205)   heparin (porcine) 3-20 Units/kg/hr (Order-Specific) Last Rate: 11 8 Units/kg/hr (03/22/18 1943)     PRN Meds:    albuterol 2 5 mg Q6H PRN   heparin (porcine) 2,000 Units PRN   heparin (porcine) 4,000 Units PRN     VTE Pharmacologic Prophylaxis: Heparin  VTE Mechanical Prophylaxis: sequential compression device  Invasive lines and devices: Invasive Devices     Peripheral Intravenous Line            Peripheral IV 03/22/18 Left Antecubital less than 1 day    Peripheral IV 03/22/18 Right Antecubital less than 1 day    Peripheral IV 03/22/18 Right Arm less than 1 day          Drain            Urethral Catheter Latex 16 Fr  102 days                   Counseling / Coordination of Care  Total Critical Care time spent 32 minutes excluding procedures, teaching and family updates  Code Status: Level 2 - DNAR: but accepts endotracheal intubation    Portions of the record may have been created with voice recognition software  Occasional wrong word or "sound a like" substitutions may have occurred due to the inherent limitations of voice recognition software  Read the chart carefully and recognize, using context, where substitutions have occurred      Katerin Hankins PA-C

## 2018-03-23 NOTE — PLAN OF CARE
Problem: DISCHARGE PLANNING - CARE MANAGEMENT  Goal: Discharge to post-acute care or home with appropriate resources  INTERVENTIONS:  - Conduct assessment to determine patient/family and health care team treatment goals, and need for post-acute services based on payer coverage, community resources, and patient preferences, and barriers to discharge  - Address psychosocial, clinical, and financial barriers to discharge as identified in assessment in conjunction with the patient/family and health care team  - Arrange appropriate level of post-acute services according to patients   needs and preference and payer coverage in collaboration with the physician and health care team  - Communicate with and update the patient/family, physician, and health care team regarding progress on the discharge plan  - Arrange appropriate transportation to post-acute venues   Outcome: [address] sent a representative to eval pt for return and  after seeing pt, she has approved the return

## 2018-03-23 NOTE — CASE MANAGEMENT
Initial Clinical Review    Admission: Date/Time/Statement: 3/22/18 @ 2021     Orders Placed This Encounter   Procedures    Inpatient Admission     Standing Status:   Standing     Number of Occurrences:   1     Order Specific Question:   Admitting Physician     Answer:   Fan Asher     Order Specific Question:   Level of Care     Answer:   Level 1 Stepdown [13]     Order Specific Question:   Estimated length of stay     Answer:   More than 2 Midnights     Order Specific Question:   Certification     Answer:   I certify that inpatient services are medically necessary for this patient for a duration of greater than two midnights  See H&P and MD Progress Notes for additional information about the patient's course of treatment  ED: Date/Time/Mode of Arrival:   ED Arrival Information     Expected Arrival Acuity Means of Arrival Escorted By Service Admission Type    - 3/22/2018 14:51 Emergent Walk-In Family Member Critical Care/ICU Emergency    Arrival Complaint    BACK PAIN           Chief Complaint:   Chief Complaint   Patient presents with    Back Pain     pt has had back pain for at least the past three days  pt lives in a facility and has dementia so it is unclear if there was any injury  pt indicates pain is in the center of her spine       History of Illness: Presented to ED for evaluation of back pain; found to be in a flutter with RVR  Receive metoprolol 5 mg without improvement  Developed episode of acute hypoxic respiratory failure requiring BiPaP  Patient received 40 mg of IV Lasix, 15mg diltiazem was placed on diltiazem gtt upon arrival toupon arrival to ICU patient titrated off of BiPAP to nasal cannula  She received 10 mg IV of metoprolol without improvement in heart rate  Diltiazem drip was continued with the initiation of p  o 60 mg PO  Patient intermittently on and off diltiazem gtt overnight  She received an additional dose of 20 mg IV Lasix      ED Vital Signs:   ED Triage Vitals [03/22/18 1456]   Temperature Pulse Respirations Blood Pressure SpO2   98 3 °F (36 8 °C) (!) 144 16 138/75 92 %      Temp Source Heart Rate Source Patient Position - Orthostatic VS BP Location FiO2 (%)   Oral Monitor Sitting Left arm --      Pain Score       5        Wt Readings from Last 1 Encounters:   03/22/18 86 5 kg (190 lb 11 2 oz)       Vital Signs (abnormal):   0500  --   137   24  127/59  81  94 %  --  --   03/23/18 0400  --   134   26  128/69  93  94 %  --  --   03/23/18 0300  --  92   26  105/51  71  97 %  --  --   03/23/18 0200  --  74  22   85/48  61  92 %  --  --   03/23/18 0100  --  75  21  98/54  74  94 %  --  --   03/23/18 0000  --   121   27  118/60  86  92 %  --  --   03/22/18 2313  --  --  --  123/79  --  --  --  --   03/22/18 2300  --   133   24  123/79  97  93 %  --  --   03/22/18 2258  --  --  --  --  --  92 %  --  --   03/22/18 2230  --   137   28  119/89  102  98 %  --  --   03/22/18 2202  97 6 °F (36 4 °C)  --  --  --  --  --  --  --   03/22/18 1931  --   114   30  --  --  95 %  --  --   03/22/18 1800  --   139   25   176/119  --  91 %  --  --   03/22/18 1636  --   135  20  143/77  --  91 %  None (Room air)  Lying   03/22/18 1456  98 3 °F (36 8 °C)   144  16  138/75  --  92 %  None (Room air)         Abnormal Labs/Diagnostic Test Results:   Results from last 7 days  Lab Units 03/22/18  1710   WBC Thousand/uL 6 30   HEMOGLOBIN g/dL 12 4   HEMATOCRIT % 38 7   PLATELETS Thousands/uL 149   NEUTROS PCT % 54   MONOS PCT % 11         Results from last 7 days  Lab Units 03/22/18  1809   SODIUM mmol/L 145   POTASSIUM mmol/L 3 9   CHLORIDE mmol/L 108   CO2 mmol/L 28   BUN mg/dL 26*   CREATININE mg/dL 1 49*   CALCIUM mg/dL 9 2   TOTAL PROTEIN g/dL 7 1   BILIRUBIN TOTAL mg/dL 0 30   ALK PHOS U/L 52   ALT U/L 26   AST U/L 17   GLUCOSE RANDOM mg/dL 110         Results from last 7 days  Lab Units 03/22/18  1809   MAGNESIUM mg/dL 2 1              Results from last 7 days  Lab Units 03/23/18  0207 03/22/18  1710   INR    --  1 19*   PTT seconds 91* 30             0  Lab Value Date/Time   TROPONINI <0 02 03/22/2018 2023   TROPONINI <0 02 03/22/2018 1710   TROPONINI 0 11 (H) 12/11/2017 0320   TROPONINI 0 11 (H) 12/11/2017 0104   TROPONINI 0 08 (H) 12/10/2017 2123   TROPONINI <0 02 11/21/2017 1748   TROPONINI <0 02 11/21/2017 1605      Imaging:  I have personally reviewed pertinent reports  EKG:   Micro:  Blood Culture:         Lab Results   Component Value Date     BLOODCX No Growth After 5 Days  12/10/2017     BLOODCX No Growth After 5 Days  12/10/2017      Urine Culture:         Lab Results   Component Value Date     URINECX >100,000 cfu/ml Citrobacter freundii (A) 12/10/2017     URINECX 50,000-59,000 cfu/ml Providencia rettgeri (A) 12/10/2017      Sputum Culture: No components found for: SPUTUMCX  Wound Culure: No results found for: WOUNDCULT           Lab Results   Component Value Date     BLOODCX No Growth After 5 Days  12/10/2017     BLOODCX No Growth After 5 Days   12/10/2017     URINECX >100,000 cfu/ml Citrobacter freundii (A) 12/10/2017     URINECX 50,000-59,000 cfu/ml Providencia rettgeri (A) 12/10/2017        ED Treatment:   Medication Administration from 03/22/2018 1451 to 03/22/2018 2200       Date/Time Order Dose Route Action Action by Comments     03/22/2018 1929 sodium chloride 0 9 % bolus 500 mL 0 mL Intravenous Stopped Dyan Serrano RN      03/22/2018 1713 sodium chloride 0 9 % bolus 500 mL 500 mL Intravenous [address] [facility]      03/22/2018 1818 metoprolol (LOPRESSOR) injection 5 mg 5 mg Intravenous Given Dyan Serrano RN      03/22/2018 2001 metoprolol (LOPRESSOR) injection 5 mg 5 mg Intravenous Not Given Dyan Serrano RN      03/22/2018 1913 diltiazem (CARDIZEM) injection 15 mg 15 mg Intravenous Given Dyan Serrano RN      03/22/2018 2002 heparin (ACS LOW)   Intravenous Not Given Dyan Serrano RN      03/22/2018 0311 albuterol inhalation solution 5 mg 5 mg Nebulization Not Given Zunilda Vee RN      03/22/2018 2002 nitroglycerin (NITROSTAT) SL tablet 0 4 mg 0 4 mg Sublingual Not Given Zunilda Vee RN      03/22/2018 1928 levalbuterol [facility]) inhalation solution 0 63 mg 0 63 mg Nebulization Given Zunilda Vee RN      03/22/2018 1942 heparin (porcine) injection 4,000 Units 4,000 Units Intravenous Given Zunilda Vee RN      03/22/2018 1943 heparin (porcine) 25,000 units in 250 mL infusion (premix) 11 8 Units/kg/hr Intravenous [address] [facility] [place]      03/22/2018 1934 furosemide (LASIX) injection 40 mg 40 mg Intravenous Given Zunilda Vee RN      03/22/2018 2121 magnesium sulfate 4 g/100 mL IVPB (premix) 4 g 4 g Intravenous [address] Bonnita Ganser Shelby, RN      03/22/2018 2058 diltiazem (CARDIZEM) tablet 60 mg 60 mg Oral Not Given Zunilda Vee RN      03/22/2018 2108 diltiazem (CARDIZEM) 125 mg in sodium chloride 0 9 % 125 mL infusion 4 5 mg/hr Intravenous Rate/Dose Change Zunilda Vee RN Hr 142, bp 175/125     03/22/2018 2028 diltiazem (CARDIZEM) 125 mg in sodium chloride 0 9 % 125 mL infusion 2 mg/hr Intravenous New Bag Zunilda Vee RN hr 135 bp 181/113          Past Medical/Surgical History:    Active Ambulatory Problems     Diagnosis Date Noted    HTN (hypertension) 12/10/2017    COPD (chronic obstructive pulmonary disease) ([facility] ) 12/10/2017    CHF (congestive heart failure) ([facility] ) 12/10/2017     Resolved Ambulatory Problems     Diagnosis Date Noted    Cystitis 12/10/2017    Elevated troponin 12/10/2017    TAM (acute kidney injury) ([facility] ) 12/10/2017     Past Medical History:   Diagnosis Date    Brain aneurysm     CAD (coronary artery disease)     Cardiac disease     CHF (congestive heart failure) (HCC)     CKD (chronic kidney disease)     COPD (chronic obstructive pulmonary disease) (HCC)     Dementia     Dementia     Disease of thyroid gland     Hyperlipidemia     Hyperlipidemia     Hypertension     MI (myocardial infarction)     Migraine     Renal disorder     Seizures (HCC)     Stroke [facility])        Admitting Diagnosis: Acute respiratory failure (HCC) [J96 00]  Back pain [M54 9]  Hypoxia [R09 02]  New onset atrial flutter ([address] ) [I48 92]    Age/Sex: 71 y o  female    Assessment/Plan:   Attestation signed by Willie Lord MD at 3/23/2018 9:33 AM      I, Merari Brown MD , saw and evaluated the patient  I have discussed the patient with the Critical Care Advanced practitioner and agree with his/her findings, Plan of Care, and MDM as documented in his/her note, except where noted  All available labs and Radiology studies were reviewed       At this point I agree with the current assessment done in the Critical Care Unit      I have conducted an independent evaluation of this patient including a detailed history and a physical exam      Critical Care Time for this patient is 30 minutes; not including time for procedures documented elsewhere or time spent teaching           Lab Results   Component Value Date     WBC 9 00 03/23/2018     HGB 13 0 03/23/2018     HCT 40 4 03/23/2018      (H) 03/23/2018      (L) 03/23/2018            Lab Results   Component Value Date     GLUCOSE 128 03/23/2018     CALCIUM 9 2 03/23/2018      03/23/2018     K 4 2 03/23/2018     CO2 30 03/23/2018      03/23/2018     BUN 27 (H) 03/23/2018     CREATININE 1 63 (H) 03/23/2018            Lab Results   Component Value Date     INR 1 19 (H) 03/22/2018     INR 1 05 12/10/2017     INR 1 01 11/21/2017     PROTIME 15 4 (H) 03/22/2018     PROTIME 13 9 12/10/2017     PROTIME 13 6 11/21/2017            Lab Results   Component Value Date     PTT 91 (H) 03/23/2018            Gen:  Well appearing and well nourished, NAD  Skin: Warm, Dry   HEENT:  PERRLA, EOMI  CV: RRR, +s1/s2, no M/R/G  Pulm: Lungs with mild fine crackles, otherwise clear  Abd: Soft, non-tender, non-distended  Ext:  No cyanosis or edema  Neuro: AAOx3; CN II-XII grossly intact; 5/5 BLUE, 5/5 BLLE; Sensation intact grossly   Psych:  Normal mood and affect        A/P: Continue diuresis, strict I/Os, monitor and replete electrolytes, TTE pending, continue home seizure regimen, diet, wean diltiazem infusion, bipap if needed, UTI coverage, heparin infusion, DNR but intubation ok, transfer to [facility] when off diltiazem     Assessment:   Principal Problem:    Acute on chronic systolic CHF (congestive heart failure) ([facility] )  Active Problems:    New onset atrial flutter (HCC)    HTN (hypertension)    COPD (chronic obstructive pulmonary disease) ([facility])    HLD (hyperlipidemia)    Dementia    Back pain  Resolved Problems:    Acute respiratory failure (HCC)     Plan:   Neuro:   · History of seizure continue home carbapine 100 mg t i d /Depakote 500 mg ER daily  · Dementia; high risk acute delirium  Frequent reorientation  CV:   · Acute on chronic CHF exacerbation  Continue diuresis goal fluid balance 1-1 5 L/24 hours  Strict I&Os  Daily weights  Low-sodium diet  Check echo  · New onset a flutter  Titrate Cardizem gtt to off  Cardizem 60 mg p o  q 6 hours  On heparin gtt; Alma Delia Vasc 6; 9 7% risk of CVA per year  Given underlying dementia patient would be at best a candidate for Coumadin  Goal INR 2 0-3 0   · Hypertension:  Hold antihypertensive while titrating rate-controlling agents  · Hyperlipidemia:  Pravastatin 40 mg daily  · CAD:  ASA, statin  Lung:   · Acute hypoxic respiratory failure in setting of CHF exacerbation  · BiPAP titrated to supplemental O2 overnight maintain O2 saturations greater than 88%  · Rule out flu  · COPD without exacerbation continue home Spiriva  · Encourage good pulmonary toilet/incentive spirometry  · Respiratory protocol  GI:   · Cardiac diet  · Ppx: Senna/Colace  FEN:   · IV fluid restriction  · Monitor electrolytes replete as warranted Mg > 2 0  K >4 0  :   · CKD status post right nephrectomy    Chronic indwelling Davis secondary to neurogenic bladder  Strict I&Os  Trend BUN/creatinine  ID:   · Trend fever curve and WBC count  · Flu pending  · UTI POA-UA revealing positive leukocyte/positive nitrites  Culture pending  Will cover based on previous sensitivities while urine culture pending  Antibiotic day 2 of ceftriaxone  Heme:   · H&H and platelets stable  Continue trend and monitor  · ppx VTE: heparin gtt  Endo:   · TSH within normal limits  Continue home levothyroxine  · Monitor glucose on daily BMPs    Msk/Skin:   · Frequent offloading repositioning to avoid skin breakdown  · PT/OT  Disposition:   · Transfer to med surg once off diltiazem gtt     Admission Orders:  [email or web address] [email or web address]  NPO  SEQ COMP DEVICE  DAILY WEIGHTS  IS  FALL PRECAUTIONS  CARDIAC DIET  PT/OT    Scheduled Meds:   Current Facility-Administered Medications:  acetaminophen 650 mg Oral Q6H PRN TANVI Smith    albuterol 2 5 mg Nebulization Q6H PRN Jayme Tomas PA-C    albuterol 5 mg Nebulization Once Neel Herbert DO    aspirin 81 mg Oral Daily Jayme Tomas PA-C    budesonide-formoterol 2 puff Inhalation BID Jayme Tomas PA-C    buPROPion 150 mg Oral Daily Jayme Tomas PA-C    butalbital-acetaminophen-caffeine 1 tablet Oral Q4H PRN TANVI Smith    carBAMazepine 100 mg Oral TID Jayme Tomas PA-C    cefTRIAXone 1,000 mg Intravenous Q24H Jayme Tomas PA-C Last Rate: 1,000 mg (03/22/18 2304)   chlorhexidine 15 mL Swish & Spit Q12H [facility] Jayme Tomas PA-C    diltiazem 60 mg Oral Q6H [facility] Jayme Tomas PA-C    divalproex sodium 500 mg Oral Daily Jayme Tomas PA-C    docusate sodium 100 mg Oral BID Jayme Tomas PA-C    ferrous sulfate 325 mg Oral BID Jayme Tomas PA-C    heparin (porcine) 3-20 Units/kg/hr (Order-Specific) Intravenous Titrated [facility] DO Piedad Last Rate: 7 76 Units/kg/hr (03/23/18 1047)   heparin (porcine) 2,000 Units Intravenous PRN Neel Herbert DO    heparin (porcine) 4,000 Units Intravenous PRN Elsy Núñez, DO    metoprolol tartrate 25 mg Oral Q12H [address] TANVI Salazar    pravastatin 40 mg Oral Daily With TransMFELIZ laughlin    senna 1 tablet Oral HS Lakeshia Flowers PA-C      Continuous Infusions:   heparin (porcine) 3-20 Units/kg/hr (Order-Specific) Last Rate: 7 76 Units/kg/hr (03/23/18 1047)     CARDIZEM GTT    PRN Meds:   acetaminophen    albuterol    butalbital-acetaminophen-caffeine    heparin (porcine)    heparin (porcine)

## 2018-03-23 NOTE — ED NOTES
Dr Jeffrey Yi at bedside pt upright in bed diaphoretic states " I cant get a breath " Pulse ox 86% on 2 L respiratory called for bipap     Marcio Connolly RN  03/22/18 2128

## 2018-03-23 NOTE — SOCIAL WORK
Mallika Winters  sent a representative to eval pt for return and  after seeing pt, she has approved the return

## 2018-03-23 NOTE — PROGRESS NOTES
History and Physical - Critical Care   Alma Melchor 71 y o  female MRN: [id number]  Unit/Bed#: ED 23 Encounter: [id number]    Reason for Admission / Chief Complaint: Acute hypoxic respiratory failure in setting of acute CHF exacerbation  History of Present Illness: Alma Melchor is a 71 y o  female who presented [facility] 3/22/18 for evaluation of back pain  Patient is stable past medical history of hypertension, hyperlipidemia, CHF, CAD, COPD, hypothyroidism, chronic indwelling Davis, dementia and history of seizures  Patient resides at [facility]  This morning while visiting with daughter she complained of persistent right lumbar back pain prompting ED evaluation  Upon arrival to ED patient was found to be in a flutter with heart rates ranging from 120-140  Patient has no known history of a flutter/atrial fibrillation  She initially received 5 mg of IV Lopressor without successful response  BP remained stable  While in ED patient became very anxious, desaturating with increased work of breathing  She was subsequently placed on BiPAP  Laboratory workup significant for BNP of 9,000  CT C/A/P without focal infiltrates or dissection; however did reveal significant cardiomegaly with mild vascular congestion  While in the ED patient received an additional 50 mg Cardizem and was initiated on Cardizem gtt  40 mg of IV Lasix given  She was admitted to ICU as step-down patient for further medical management  History obtained from chart review and the patient      Past Medical History:  Past Medical History:   Diagnosis Date    Brain aneurysm     Cardiac disease     CHF (congestive heart failure) (HCC)     COPD (chronic obstructive pulmonary disease) (HCC)     Dementia     Disease of thyroid gland     Hyperlipidemia     Hypertension     MI (myocardial infarction)     Migraine     Renal disorder     Seizures ([address] )     Stroke [facility])        Past Surgical History:  Past Surgical History:   Procedure Laterality Date    APPENDECTOMY      BLADDER TUMOR EXCISION      BRAIN SURGERY      CARDIAC SURGERY      CORONARY STENT PLACEMENT      5 stents    EYE SURGERY      MANDIBLE FRACTURE SURGERY      TONSILLECTOMY      TUBAL LIGATION      UTERINE FIBROID SURGERY         Past Family History:  History reviewed  No pertinent family history      Social History:  History   Smoking Status    Former Smoker   Smokeless Tobacco    Never Used     History   Alcohol Use No     History   Drug Use No     Marital Status:   Exercise History: unknown     Medications:  Current Facility-Administered Medications   Medication Dose Route Frequency    albuterol inhalation solution 5 mg  5 mg Nebulization Once    [START ON 3/23/2018] aspirin (ECOTRIN LOW STRENGTH) EC tablet 81 mg  81 mg Oral Daily    budesonide-formoterol (SYMBICORT) 160-4 5 mcg/act inhaler 2 puff  2 puff Inhalation BID    [START ON 3/23/2018] buPROPion (WELLBUTRIN SR) 12 hr tablet 150 mg  150 mg Oral Daily    carBAMazepine (TEGretol) chewable tablet 100 mg  100 mg Oral TID    ceftriaxone (ROCEPHIN) 1 g/50 mL in dextrose IVPB  1,000 mg Intravenous Q24H    chlorhexidine (PERIDEX) 0 12 % oral rinse 15 mL  15 mL Swish & Spit Q12H [address]    diltiazem (CARDIZEM) 125 mg in sodium chloride 0 9 % 125 mL infusion  1-15 mg/hr Intravenous Titrated    diltiazem (CARDIZEM) 125 mg/25 mL injection **AcuDose Override Pull**        [START ON 3/23/2018] divalproex sodium (DEPAKOTE ER) 24 hr tablet 500 mg  500 mg Oral Daily    ferrous sulfate tablet 325 mg  325 mg Oral BID    heparin (porcine) 25,000 units in 250 mL infusion (premix)  3-20 Units/kg/hr (Order-Specific) Intravenous Titrated    heparin (porcine) injection 2,000 Units  2,000 Units Intravenous PRN    heparin (porcine) injection 4,000 Units  4,000 Units Intravenous PRN    [START ON 3/23/2018] levalbuterol (XOPENEX) inhalation solution 0 63 mg  0 63 mg Nebulization Q6H    magnesium sulfate 4 g/100 mL IVPB (premix) 4 g  4 g Intravenous Once    [START ON 3/23/2018] pravastatin (PRAVACHOL) tablet 40 mg  40 mg Oral Daily With Dinner     Home medications:  Prior to Admission medications    Medication Sig Start Date End Date Taking?  Authorizing Provider   acetaminophen (TYLENOL) 500 mg tablet Take 500 mg by mouth every 6 (six) hours as needed for mild pain    Historical Provider, MD   albuterol (2 5 mg/3 mL) 0 083 % nebulizer solution Take 2 5 mg by nebulization every 6 (six) hours as needed for wheezing    Historical Provider, MD   albuterol (PROVENTIL HFA,VENTOLIN HFA) 90 mcg/act inhaler Inhale 2 puffs every 6 (six) hours as needed for wheezing    Historical Provider, MD   amLODIPine (NORVASC) 10 mg tablet Take 10 mg by mouth daily    Historical Provider, MD   aspirin (ECOTRIN LOW STRENGTH) 81 mg EC tablet Take 81 mg by mouth daily    Historical Provider, MD   budesonide-formoterol (SYMBICORT) 160-4 5 mcg/act inhaler Inhale 2 puffs 2 (two) times a day    Historical Provider, MD   buPROPion (WELLBUTRIN SR) 150 mg 12 hr tablet Take 150 mg by mouth daily    Historical Provider, MD   carBAMazepine (TEGretol) 100 mg chewable tablet Chew 100 mg 3 (three) times a day    Historical Provider, MD   Cholecalciferol (VITAMIN D) 2000 units CAPS Take 1 capsule by mouth daily      Historical Provider, MD   cloNIDine (CATAPRES) 0 1 mg tablet Take 0 2 mg by mouth daily at bedtime    Historical Provider, MD   divalproex sodium (DEPAKOTE ER) 250 mg 24 hr tablet Take 500 mg by mouth daily    Historical Provider, MD   fenofibrate micronized (LOFIBRA) 67 MG capsule Take 67 mg by mouth every morning before breakfast    Historical Provider, MD   ferrous sulfate 325 (65 Fe) mg tablet Take 325 mg by mouth 2 (two) times a day    Historical Provider, MD   ipratropium-albuterol (DUO-NEB) 0 5-2 5 mg/mL Take 3 mL by nebulization every 6 (six) hours    Historical Provider, MD   Levothyroxine Sodium 88 MCG CAPS Take 88 mcg by mouth every morning      Historical Provider, MD   LORazepam (ATIVAN) 0 5 mg tablet Take 0 5 mg by mouth 2 (two) times a day    Historical Provider, MD   metoprolol succinate (TOPROL-XL) 50 mg 24 hr tablet Take 1 tablet by mouth every morning 12/15/17   Lawence Cheeks, DO   Sennosides-Docusate Sodium (SENNA PLUS PO) Take by mouth daily as needed    Historical Provider, MD   simvastatin (ZOCOR) 20 mg tablet Take 20 mg by mouth daily at bedtime    Historical Provider, MD     Allergies: Allergies   Allergen Reactions    Ramipril Anaphylaxis    Spiriva Handihaler [Tiotropium Bromide Monohydrate] Swelling    Penicillins        ROS:   Review of Systems   Constitutional: Negative  HENT: Negative  Eyes: Negative  Respiratory: Positive for chest tightness and shortness of breath  Negative for apnea and cough  Cardiovascular: Positive for leg swelling  Negative for chest pain  Gastrointestinal: Negative  Musculoskeletal: Positive for back pain  Negative for myalgias, neck pain and neck stiffness  Skin: Negative  Neurological: Negative  Vitals:  Vitals:    18 1636 18 1800 18 1912 18   BP: 143/77 (!) 176/119     BP Location: Right arm      Pulse: (!) 135 (!) 139  (!) 114   Resp: 20 (!) 25  (!) 30   Temp:       TempSrc:       SpO2: 91% 91%  95%   Weight:   85 7 kg (189 lb)      Temperature:   Temp (24hrs), Av 3 °F (36 8 °C), Min:98 3 °F (36 8 °C), Max:98 3 °F (36 8 °C)    Current Temperature: 98 3 °F (36 8 °C)    Weights:   IBW: -92 5 kg  Body mass index is 36 91 kg/m²  Respiratory    Lab Data (Last 4 hours)    None         O2/Vent Data (Last 4 hours)      1931          Non-Invasive Ventilation Mode BiPAP                  Physical Exam:  Physical Exam   Constitutional: She appears well-developed and well-nourished  HENT:   Head: Normocephalic and atraumatic  Eyes: EOM are normal  Pupils are equal, round, and reactive to light  Pulmonary/Chest: Effort normal  No respiratory distress  She has no wheezes  She has rales  Fine crackles along bilateral posterior lung bases  No expiratory wheezes   Abdominal: Soft  Bowel sounds are normal  She exhibits no distension  There is no tenderness  There is no rebound and no guarding  Musculoskeletal: Normal range of motion  She exhibits edema  One to 2+ pitting edema bilateral lower extremities   Neurological: She is alert  No cranial nerve deficit  Coordination normal    Patient oriented to person only  Skin: Skin is warm  Tenderness to palpation along the right paraspinal lumbar region, no obvious overlying ecchymosis       Labs:    Results from last 7 days  Lab Units 03/22/18  1710   WBC Thousand/uL 6 30   HEMOGLOBIN g/dL 12 4   HEMATOCRIT % 38 7   PLATELETS Thousands/uL 149   NEUTROS PCT % 54   MONOS PCT % 11        Results from last 7 days  Lab Units 03/22/18  1809   SODIUM mmol/L 145   POTASSIUM mmol/L 3 9   CHLORIDE mmol/L 108   CO2 mmol/L 28   BUN mg/dL 26*   CREATININE mg/dL 1 49*   CALCIUM mg/dL 9 2   TOTAL PROTEIN g/dL 7 1   BILIRUBIN TOTAL mg/dL 0 30   ALK PHOS U/L 52   ALT U/L 26   AST U/L 17   GLUCOSE RANDOM mg/dL 110       Results from last 7 days  Lab Units 03/22/18  1809   MAGNESIUM mg/dL 2 1            Results from last 7 days  Lab Units 03/22/18  1710   INR  1 19*   PTT seconds 30           0  Lab Value Date/Time   TROPONINI <0 02 03/22/2018 2023   TROPONINI <0 02 03/22/2018 1710   TROPONINI 0 11 (H) 12/11/2017 0320   TROPONINI 0 11 (H) 12/11/2017 0104   TROPONINI 0 08 (H) 12/10/2017 2123   TROPONINI <0 02 11/21/2017 1748   TROPONINI <0 02 11/21/2017 1605       Imaging:  I have personally reviewed pertinent reports  and I have personally reviewed pertinent films in PACS  EKG: This was personally reviewed by myself  Micro:  Lab Results   Component Value Date    BLOODCX No Growth After 5 Days  12/10/2017    BLOODCX No Growth After 5 Days   12/10/2017    URINECX >100,000 cfu/ml Citrobacter freundii (A) 12/10/2017    URINECX 50,000-59,000 cfu/ml Providencia rettgeri (A) 12/10/2017       ______________________________________________________________________    Assessment:   Acute CHF exacerbation  Acute hypoxic respiratory failure  New onset atrial flutter  UTI POA  Chronic indwelling Davis  Back pain  Hypertension  Hyperlipidemia  CAD  CKD  COPD  Hypothyroidism  Dementia  History of seizure        Plan:      Neuro:    History of seizure:  Continue home carbamapine 100 mg t i d , and Depakote 500 mg ER daily   Dementia:  High risk for acute delirium  Frequent reorientation  Regulate sleep-wake cycles  Daily CAM ICU  Anxiety:    CV:    Acute on chronic CHF exacerbation:  Receive 40 mg IV Lasix  Strict I&Os/ daily weights  Goal fluid balance negative 1-1 5 L/24 hours  New onset a flutter: 4 g Mg  Started on Cardizem gtt while in ED  Transition to BB and titrate to off  Heparin gtt  Trend troponin  Check echo   HTN: Hold antihypertensives while titrating rate-controlling agents   HLD:  Pravastatin 40 mg daily   Lung:    Acute hypoxic respiratory failure in setting of acute CHF exacerbation and underlying COPD  BiPAP maintain O2 saturations greater than 88%  Wean as tolerated   Rule out flu   COPD:  Continue home Spiriva   Respiratory protocol   GI:    NPO while on BiPAP   Ppx: senna/colace   FEN:    Hold IV fluids   Monitor electrolytes  Replete as warranted  K > 4 0, Mg > 2  :    CKD s/p R Nephrectomy  Chronic indwelling Davis strict I&Os  Trend BUN/creatinine   ID:    Trend fever curve and WBC count  Rule out flu   UTI POA in setting chronic indwelling Davis, possible contaminant  Will cover ceftriaxone based on previous sensitivities while urine culture pending  Antibiotic day 1   Heme:  H&H and platelets stable  Continue trend monitor  VTE ppx: Heparin   Endo: THS wnl  Msk/Skin: Back pain; no acute fracture    Reproducible nature likely musculoskeletal   Tylenol prn  May try Lidoderm patch   Disposition: step down     ______________________________________________________________________    VTE Pharmacologic Prophylaxis: Heparin  VTE Mechanical Prophylaxis: sequential compression device    Invasive lines and devices: Invasive Devices     Peripheral Intravenous Line            Peripheral IV 03/22/18 Right Antecubital less than 1 day          Drain            Urethral Catheter Latex 16 Fr  102 days                Code Status: Level 2 - DNAR: but accepts endotracheal intubation  POA: Yes  POLST:      Given critical illness, patient length of stay will require greater than two midnights  Counseling / Coordination of Care  Total Critical Care time spent 34 minutes excluding procedures, teaching and family updates  Portions of the record may have been created with voice recognition software  Occasional wrong word or "sound a like" substitutions may have occurred due to the inherent limitations of voice recognition software  Read the chart carefully and recognize, using context, where substitutions have occurred        Gera Prado PA-C

## 2018-03-23 NOTE — SOCIAL WORK
CM met with pt and daughter at bedside  Pt lives at [facility] with 10 steps w/railing to reach her apt  She has no problem navigating steps, but gets assistance with ADL's from the [facility] staff  She uses a cane prn in her room and a walker when she is downstairs  She was in [facility] for rehab following a fall 5 years ago  She has a chronic Davis  She gets her rxs through [facility] and has no problem with her co-pays  She does have anxiety and depression that is treated by a psychiatrist with medication  She was also admitted to [facility] in Jan 2018  Her PCP is Dr [facility]  She has a POA and Advanced Directive  Her POA is her daughter Ghada Rutledge  Denies substance abuse issues  Daughter transports to all appoints and will transport home when pt is medically cleared  CM discussed discharge plan and pt and daughter are hoping that pt will be able to return to [facility]  CM asked ICU (chance)to put in a request for PT to see this pt  CM department will continue to follow

## 2018-03-23 NOTE — H&P
Please see progress note from 3/22/18 at 2001 for details of H/P    It was mislabeled as a progress note

## 2018-03-23 NOTE — PLAN OF CARE
Problem: DISCHARGE PLANNING - CARE MANAGEMENT  Goal: Discharge to post-acute care or home with appropriate resources  INTERVENTIONS:  - Conduct assessment to determine patient/family and health care team treatment goals, and need for post-acute services based on payer coverage, community resources, and patient preferences, and barriers to discharge  - Address psychosocial, clinical, and financial barriers to discharge as identified in assessment in conjunction with the patient/family and health care team  - Arrange appropriate level of post-acute services according to patients   needs and preference and payer coverage in collaboration with the physician and health care team  - Communicate with and update the patient/family, physician, and health care team regarding progress on the discharge plan  - Arrange appropriate transportation to post-acute venues  Outcome: Progressing  CM met with pt and daughter at bedside  Pt lives at [facility] with 10 steps w/railing to reach her apt  She has no problem navigating steps, but gets assistance with ADL's from the [facility] staff  She uses a cane prn in her room and a walker when she is downstairs  She was in [facility] for rehab following a fall 5 years ago  She has a chronic Davis  She gets her rxs through [facility] and has no problem with her co-pays  She does have anxiety and depression that is treated by a psychiatrist with medication  She was also admitted to [facility] [address] [facility] in Jan 2018  Her PCP is Dr Devaughn Bingham  She has a POA and Advanced Directive  Her POA is her daughter Vargas Goldsmith  Denies substance abuse issues  Daughter transports to all appoints and will transport home when pt is medically cleared  CM discussed discharge plan and pt and daughter are hoping that pt will be able to return to [facility]  CM asked ICU (chance)to put in a request for PT to see this pt    CM department will continue to follow

## 2018-03-24 PROBLEM — N39.0 UTI (URINARY TRACT INFECTION): Status: ACTIVE | Noted: 2018-03-24

## 2018-03-24 PROBLEM — G40.909 SEIZURE DISORDER (HCC): Status: ACTIVE | Noted: 2018-03-24

## 2018-03-24 PROBLEM — B33.8 RSV INFECTION: Status: ACTIVE | Noted: 2018-03-24

## 2018-03-24 LAB
ANION GAP SERPL CALCULATED.3IONS-SCNC: 8 MMOL/L (ref 4–13)
APTT PPP: 45 SECONDS (ref 23–35)
APTT PPP: 50 SECONDS (ref 23–35)
APTT PPP: 60 SECONDS (ref 23–35)
BACTERIA UR CULT: ABNORMAL
BACTERIA UR CULT: ABNORMAL
BASOPHILS # BLD AUTO: 0.04 THOUSANDS/ΜL (ref 0–0.1)
BASOPHILS NFR BLD AUTO: 1 % (ref 0–1)
BUN SERPL-MCNC: 32 MG/DL (ref 5–25)
CALCIUM SERPL-MCNC: 8.7 MG/DL (ref 8.3–10.1)
CHLORIDE SERPL-SCNC: 101 MMOL/L (ref 100–108)
CO2 SERPL-SCNC: 32 MMOL/L (ref 21–32)
CREAT SERPL-MCNC: 1.68 MG/DL (ref 0.6–1.3)
EOSINOPHIL # BLD AUTO: 0.39 THOUSAND/ΜL (ref 0–0.61)
EOSINOPHIL NFR BLD AUTO: 7 % (ref 0–6)
ERYTHROCYTE [DISTWIDTH] IN BLOOD BY AUTOMATED COUNT: 12.6 % (ref 11.6–15.1)
GFR SERPL CREATININE-BSD FRML MDRD: 30 ML/MIN/1.73SQ M
GLUCOSE SERPL-MCNC: 117 MG/DL (ref 65–140)
HCT VFR BLD AUTO: 35.2 % (ref 34.8–46.1)
HGB BLD-MCNC: 11.4 G/DL (ref 11.5–15.4)
LYMPHOCYTES # BLD AUTO: 1.74 THOUSANDS/ΜL (ref 0.6–4.47)
LYMPHOCYTES NFR BLD AUTO: 30 % (ref 14–44)
MAGNESIUM SERPL-MCNC: 2.5 MG/DL (ref 1.6–2.6)
MCH RBC QN AUTO: 32.8 PG (ref 26.8–34.3)
MCHC RBC AUTO-ENTMCNC: 32.4 G/DL (ref 31.4–37.4)
MCV RBC AUTO: 101 FL (ref 82–98)
MONOCYTES # BLD AUTO: 0.72 THOUSAND/ΜL (ref 0.17–1.22)
MONOCYTES NFR BLD AUTO: 12 % (ref 4–12)
NEUTROPHILS # BLD AUTO: 2.96 THOUSANDS/ΜL (ref 1.85–7.62)
NEUTS SEG NFR BLD AUTO: 50 % (ref 43–75)
NRBC BLD AUTO-RTO: 0 /100 WBCS
PLATELET # BLD AUTO: 125 THOUSANDS/UL (ref 149–390)
PMV BLD AUTO: 12 FL (ref 8.9–12.7)
POTASSIUM SERPL-SCNC: 3.6 MMOL/L (ref 3.5–5.3)
RBC # BLD AUTO: 3.48 MILLION/UL (ref 3.81–5.12)
SODIUM SERPL-SCNC: 141 MMOL/L (ref 136–145)
WBC # BLD AUTO: 5.88 THOUSAND/UL (ref 4.31–10.16)

## 2018-03-24 PROCEDURE — 85730 THROMBOPLASTIN TIME PARTIAL: CPT | Performed by: PHYSICIAN ASSISTANT

## 2018-03-24 PROCEDURE — 80048 BASIC METABOLIC PNL TOTAL CA: CPT | Performed by: PHYSICIAN ASSISTANT

## 2018-03-24 PROCEDURE — 83735 ASSAY OF MAGNESIUM: CPT | Performed by: PHYSICIAN ASSISTANT

## 2018-03-24 PROCEDURE — 99232 SBSQ HOSP IP/OBS MODERATE 35: CPT | Performed by: NURSE PRACTITIONER

## 2018-03-24 PROCEDURE — 85730 THROMBOPLASTIN TIME PARTIAL: CPT | Performed by: NURSE PRACTITIONER

## 2018-03-24 PROCEDURE — 85025 COMPLETE CBC W/AUTO DIFF WBC: CPT | Performed by: PHYSICIAN ASSISTANT

## 2018-03-24 RX ORDER — POTASSIUM CHLORIDE 20 MEQ/1
40 TABLET, EXTENDED RELEASE ORAL ONCE
Status: COMPLETED | OUTPATIENT
Start: 2018-03-24 | End: 2018-03-24

## 2018-03-24 RX ADMIN — BUTALBITAL, ACETAMINOPHEN AND CAFFEINE 1 TABLET: 50; 325; 40 TABLET ORAL at 12:43

## 2018-03-24 RX ADMIN — CHLORHEXIDINE GLUCONATE 15 ML: 1.2 RINSE ORAL at 08:52

## 2018-03-24 RX ADMIN — CARBAMAZEPINE 100 MG: 100 TABLET, CHEWABLE ORAL at 21:21

## 2018-03-24 RX ADMIN — BUDESONIDE AND FORMOTEROL FUMARATE DIHYDRATE 2 PUFF: 160; 4.5 AEROSOL RESPIRATORY (INHALATION) at 08:52

## 2018-03-24 RX ADMIN — Medication 325 MG: at 08:52

## 2018-03-24 RX ADMIN — DILTIAZEM HYDROCHLORIDE 60 MG: 60 TABLET, FILM COATED ORAL at 05:00

## 2018-03-24 RX ADMIN — BUTALBITAL, ACETAMINOPHEN AND CAFFEINE 1 TABLET: 50; 325; 40 TABLET ORAL at 02:52

## 2018-03-24 RX ADMIN — Medication 325 MG: at 17:15

## 2018-03-24 RX ADMIN — HEPARIN SODIUM AND DEXTROSE 11.8 UNITS/KG/HR: 10000; 5 INJECTION INTRAVENOUS at 17:16

## 2018-03-24 RX ADMIN — PRAVASTATIN SODIUM 40 MG: 40 TABLET ORAL at 17:15

## 2018-03-24 RX ADMIN — DILTIAZEM HYDROCHLORIDE 60 MG: 60 TABLET, FILM COATED ORAL at 17:15

## 2018-03-24 RX ADMIN — DIVALPROEX SODIUM 500 MG: 500 TABLET, EXTENDED RELEASE ORAL at 08:53

## 2018-03-24 RX ADMIN — HEPARIN SODIUM 2000 UNITS: 1000 INJECTION, SOLUTION INTRAVENOUS; SUBCUTANEOUS at 04:55

## 2018-03-24 RX ADMIN — HEPARIN SODIUM 2000 UNITS: 1000 INJECTION, SOLUTION INTRAVENOUS; SUBCUTANEOUS at 17:37

## 2018-03-24 RX ADMIN — DOCUSATE SODIUM 100 MG: 100 CAPSULE, LIQUID FILLED ORAL at 08:52

## 2018-03-24 RX ADMIN — METOPROLOL TARTRATE 50 MG: 50 TABLET ORAL at 08:52

## 2018-03-24 RX ADMIN — CARBAMAZEPINE 100 MG: 100 TABLET, CHEWABLE ORAL at 17:16

## 2018-03-24 RX ADMIN — BUDESONIDE AND FORMOTEROL FUMARATE DIHYDRATE 2 PUFF: 160; 4.5 AEROSOL RESPIRATORY (INHALATION) at 17:16

## 2018-03-24 RX ADMIN — DILTIAZEM HYDROCHLORIDE 60 MG: 60 TABLET, FILM COATED ORAL at 23:53

## 2018-03-24 RX ADMIN — BUPROPION HYDROCHLORIDE 150 MG: 150 TABLET, FILM COATED, EXTENDED RELEASE ORAL at 08:53

## 2018-03-24 RX ADMIN — ASPIRIN 81 MG: 81 TABLET, COATED ORAL at 08:52

## 2018-03-24 RX ADMIN — ACETAMINOPHEN 650 MG: 325 TABLET, FILM COATED ORAL at 17:50

## 2018-03-24 RX ADMIN — MELATONIN TAB 3 MG 6 MG: 3 TAB at 21:21

## 2018-03-24 RX ADMIN — SENNOSIDES 8.6 MG: 8.6 TABLET, FILM COATED ORAL at 21:21

## 2018-03-24 RX ADMIN — DILTIAZEM HYDROCHLORIDE 60 MG: 60 TABLET, FILM COATED ORAL at 00:12

## 2018-03-24 RX ADMIN — CARBAMAZEPINE 100 MG: 100 TABLET, CHEWABLE ORAL at 08:53

## 2018-03-24 RX ADMIN — CHLORHEXIDINE GLUCONATE 15 ML: 1.2 RINSE ORAL at 21:21

## 2018-03-24 RX ADMIN — METOPROLOL TARTRATE 5 MG: 5 INJECTION, SOLUTION INTRAVENOUS at 17:42

## 2018-03-24 RX ADMIN — DILTIAZEM HYDROCHLORIDE 60 MG: 60 TABLET, FILM COATED ORAL at 12:43

## 2018-03-24 RX ADMIN — DOCUSATE SODIUM 100 MG: 100 CAPSULE, LIQUID FILLED ORAL at 17:15

## 2018-03-24 RX ADMIN — Medication 1000 MG: at 21:21

## 2018-03-24 RX ADMIN — POTASSIUM CHLORIDE 40 MEQ: 1500 TABLET, EXTENDED RELEASE ORAL at 08:52

## 2018-03-24 RX ADMIN — METOPROLOL TARTRATE 50 MG: 50 TABLET ORAL at 21:21

## 2018-03-24 RX ADMIN — ACETAMINOPHEN 650 MG: 325 TABLET, FILM COATED ORAL at 09:00

## 2018-03-24 NOTE — PROGRESS NOTES
Progress Note - Critical Care   Fabian Lundberg 71 y o  female MRN: [id number]  Unit/Bed#:  Encounter: [id number]    Attending Physician: Karl Lagos MD      ______________________________________________________________________  Assessment and Plan:   Principal Problem:    Acute on chronic systolic CHF (congestive heart failure) ([facility] [address] )  Active Problems:    New onset atrial flutter (HCC)    HTN (hypertension)    COPD (chronic obstructive pulmonary disease) ([facility])    HLD (hyperlipidemia)    Dementia    Back pain  Resolved Problems:    Acute respiratory failure ([facility] )        Neuro:   History of seizure disorder:  Continue carbapine and Depakote  Dementia:  Remains high risk for acute delirium, maintain frequent reorientation monitoring, encourage good sleep hygiene, melatonin added    CV:   Acute on chronic CHF:  Continue diuresis with a goal fluid balance of 1-1 5 L/24 hours  New atrial flutter:  Cardizem drip titrated to off, continue Cardizem 60 mg p o  q 6 hours, beta-blocker dose increased to 50 mg b i d , anticoagulation started  Hypertension:  Lopressor increased to 50 mg b i d  p o  Pulm:   CHF exacerbation:  Continue diuresis is indicated  Provide BiPAP as needed, continue supplemental nasal cannula oxygen to maintain O2 sats greater than 88%  Encourage incentive spirometry  Provide bronchodilators as needed    GI:  On bowel regimen of Senokot and Colace  Provide for PUD prophylaxis    :   Monitor I&O closely, monitor renal indices    F/E/N:  Monitor replace electrolytes as needed/diet as tolerated    ID:  RSV positive:  Maintain droplet precautions  UTI present:  Continue ceftriaxone as ordered    Heme:  Monitor H&H and treat as needed  Currently on heparin drip    Endo:  Monitor thyroid and blood glucose     Msk/Skin:  Encourage patient to be out of bed    Disposition:  Remains in ICU at this time    Code Status: Level 2 - DNAR: but accepts endotracheal intubation    Counseling / Coordination of Care  Total Critical Care time spent 30 minutes excluding procedures, teaching and family updates  ______________________________________________________________________      24 Hour Events:  Increase Lopressor to 50 mg p o  b i d       ______________________________________________________________________    Physical Exam:   Constitutional:  In no acute distress  HEENT:  Normocephalic, atraumatic, pupils equal reactive, EOM intact, sclera anicteric, trachea midline, airway patent, and negative JVD, neck supple  Pulm:  Equal and clear to auscultation  CV: HRRR, S1-S2 no gallops murmurs or rubs  ABD:  Soft/nontender  :  Deferred  M/S:  Good range of motion x4  Skin:  Dry/intact  Neuro:  No focal deficits      ______________________________________________________________________  Vitals:    18 2100 18 2107 18 2300 18 0012   BP: 121/84 121/84 99/70 131/73   BP Location:   Left arm    Pulse: (!) 112 (!) 110 (!) 120    Resp: (!) 25  22    Temp:   97 9 °F (36 6 °C)    TempSrc:   Oral    SpO2: 90%  95%    Weight:       Height:                  Temperature:   Temp (24hrs), Av 5 °F (36 9 °C), Min:97 8 °F (36 6 °C), Max:99 7 °F (37 6 °C)    Current Temperature: 97 9 °F (36 6 °C)  Weights:   IBW: 45 5 kg    Body mass index is 37 24 kg/m²  Weight (last 2 days)     Date/Time   Weight    18  86 5 (190 7)    18 1912  85 7 (189)            Hemodynamic Monitoring:  N/A     Non-Invasive/Invasive Ventilation Settings:  Respiratory    Lab Data (Last 4 hours)    None         O2/Vent Data (Last 4 hours)    None              No results found for: PHART, ZGV3AWL, PO2ART, PBI1PLC, B5EVVDOU, BEART, SOURCE  SpO2: SpO2: 95 %  Intake and Outputs:  I/O        07 -  0700 701 -  0700    P  O   450    I V  (mL/kg) 109 5 (1 3) 208 9 (2 4)    IV Piggyback 600 50    Total Intake(mL/kg) 709 5 (8 2) 708 9 (8 2)    Urine (mL/kg/hr) 2150 1350 (0 7)    Total Output 2150 1350    Net -1440 5 -641  1              UOP: qs/hour   Nutrition:        Diet Orders            Start     Ordered    03/23/18 0306  Diet Cardiac; Sodium 2 GM  Diet effective now     Question Answer Comment   Diet Type Cardiac    Cardiac Sodium 2 GM    RD to adjust diet per protocol? No        03/23/18 0305          Labs:     Results from last 7 days  Lab Units 03/23/18  0525 03/22/18  1710   WBC Thousand/uL 9 00 6 30   HEMOGLOBIN g/dL 13 0 12 4   HEMATOCRIT % 40 4 38 7   PLATELETS Thousands/uL 138* 149   NEUTROS PCT % 63 54   MONOS PCT % 12 11       Results from last 7 days  Lab Units 03/23/18  0525 03/22/18  1809   SODIUM mmol/L 145 145   POTASSIUM mmol/L 4 2 3 9   CHLORIDE mmol/L 105 108   CO2 mmol/L 30 28   BUN mg/dL 27* 26*   CREATININE mg/dL 1 63* 1 49*   CALCIUM mg/dL 9 2 9 2   TOTAL PROTEIN g/dL  --  7 1   BILIRUBIN TOTAL mg/dL  --  0 30   ALK PHOS U/L  --  52   ALT U/L  --  26   AST U/L  --  17   GLUCOSE RANDOM mg/dL 128 110       Results from last 7 days  Lab Units 03/23/18  0525 03/22/18  1809   MAGNESIUM mg/dL 3 4* 2 1     Lab Results   Component Value Date    PHOS 4 3 (H) 03/23/2018        Results from last 7 days  Lab Units 03/23/18  2122 03/23/18  1502 03/23/18  0923  03/22/18  1710   INR   --   --   --   --  1 19*   PTT seconds 73* 49* 95*  < > 30   < > = values in this interval not displayed  0  Lab Value Date/Time   TROPONINI <0 02 03/22/2018 2023   TROPONINI <0 02 03/22/2018 1710   TROPONINI 0 11 (H) 12/11/2017 0320   TROPONINI 0 11 (H) 12/11/2017 0104   TROPONINI 0 08 (H) 12/10/2017 2123   TROPONINI <0 02 11/21/2017 1748   TROPONINI <0 02 11/21/2017 1605         ABG:No results found for: PHART, GHP6AZC, PO2ART, TBC6WZP, I8YPJXZL, BEART, SOURCE  Imaging:  I have personally reviewed pertinent reports  EKG:  Atrial flutter  Micro:  Lab Results   Component Value Date    BLOODCX No Growth After 5 Days  12/10/2017    BLOODCX No Growth After 5 Days  12/10/2017    URINECX Culture results to follow   03/22/2018 URINECX >100,000 cfu/ml Citrobacter freundii (A) 12/10/2017    URINECX 50,000-59,000 cfu/ml Providencia rettgeri (A) 12/10/2017     Allergies:    Allergies   Allergen Reactions    Ramipril Anaphylaxis    Spiriva Handihaler [Tiotropium Bromide Monohydrate] Swelling    Penicillins      Medications:   Scheduled Meds:  Current Facility-Administered Medications:  acetaminophen 650 mg Oral Q6H PRN TANVI Ramirez    albuterol 2 5 mg Nebulization Q6H PRN [facility]    albuterol 5 mg Nebulization Once Neel Herbert, DO    aspirin 81 mg Oral Daily [facility], PA-C    budesonide-formoterol 2 puff Inhalation BID [facility], PA-C    buPROPion 150 mg Oral Daily [facility], PA-C    butalbital-acetaminophen-caffeine 1 tablet Oral Q4H PRN TANVI Ramirez    carBAMazepine 100 mg Oral TID [facility], PA-C    cefTRIAXone 1,000 mg Intravenous Q24H [facility], PA-C Last Rate: Stopped (03/23/18 2200)   chlorhexidine 15 mL Swish & Spit Q12H [facility], PA-C    diltiazem 60 mg Oral Q6H [facility], PA-C    divalproex sodium 500 mg Oral Daily [facility], PA-C    docusate sodium 100 mg Oral BID [facility], PA-C    ferrous sulfate 325 mg Oral BID [facility], PA-C    heparin (porcine) 3-20 Units/kg/hr (Order-Specific) Intravenous Titrated Gemma Fer, DO Last Rate: 9 76 Units/kg/hr (03/23/18 1836)   heparin (porcine) 2,000 Units Intravenous PRN Neel ROJO Zwnicanorbel, DO    heparin (porcine) 4,000 Units Intravenous PRN Neel Joelbel, DO    melatonin 6 mg Oral HS MORENA Skinner-C    metoprolol 5 mg Intravenous Q6H PRN TANVI Ramirez    metoprolol tartrate 50 mg Oral Q12H [facility], PA-C    pravastatin 40 mg Oral Daily With [employer], PA-C    senna 1 tablet Oral HS Jeffery Spivey PA-C      Continuous Infusions:  heparin (porcine) 3-20 Units/kg/hr (Order-Specific) Last Rate: 9 76 Units/kg/hr (03/23/18 1836)     PRN Meds:    acetaminophen 650 mg Q6H PRN   albuterol 2 5 mg Q6H PRN   butalbital-acetaminophen-caffeine 1 tablet Q4H PRN   heparin (porcine) 2,000 Units PRN   heparin (porcine) 4,000 Units PRN   metoprolol 5 mg Q6H PRN     VTE Pharmacologic Prophylaxis: Heparin  VTE Mechanical Prophylaxis: sequential compression device  Invasive lines and devices: Invasive Devices     Peripheral Intravenous Line            Peripheral IV 03/22/18 Left Antecubital 1 day    Peripheral IV 03/22/18 Right Arm 1 day          Drain            Urethral Catheter Latex 16 Fr  103 days                     Portions of the record may have been created with voice recognition software  Occasional wrong word or "sound a like" substitutions may have occurred due to the inherent limitations of voice recognition software  Read the chart carefully and recognize, using context, where substitutions have occurred      Collis Kayser, PA-C

## 2018-03-24 NOTE — PROGRESS NOTES
Progress Note - ICU Transfer to SD/MS tele   Kathreen Fothergill 71 y o  female MRN: [id number]  [address] [facility]   Unit/Bed#:  Encounter: [id number]    Code Status: Level 2 - DNAR: but accepts endotracheal intubation  POA: Yes  POLST:      Reason for ICU admission:  Heart failure, atrial fibrillation, UTI, RSV    Active problems:   Principal Problem:    Acute on chronic systolic CHF (congestive heart failure) ([place] [address] )  Active Problems:    New onset atrial flutter (HCC)    HTN (hypertension)    COPD (chronic obstructive pulmonary disease) ([place] [address] )    HLD (hyperlipidemia)    Dementia    Back pain    UTI (urinary tract infection)    Seizure disorder ([place] [address] )    RSV infection  Resolved Problems:    Acute respiratory failure ([place] [address] )      Consultants:  None    History of Present Illness/Summary of clinical course:  67-year-old female with past medical history significant for hypertension, hyperlipidemia, CHF, CAD, COPD, hypothyroidism, chronic indwelling Davis, dementia and history of seizures presents [facility] from [facility] for evaluation of back pain  Upon evaluation in the emergency department was discovered that she has a new onset a flutter/atrial fibrillation  She became controlled with Cardizem, and beta-blockers were added for better control  PE could not be excluded, therefore in the setting of atrial fib anticoagulation was started  Patient was diuresed with improvement of her respiratory symptomatology  Patient has improved to the point where she is stable for transfer to medical-surgical tele  Recent or scheduled procedures:  None    Outstanding/pending diagnostics:  None    Cultures:  Pending       Mobilization Plan:  Out of bed to chair    Nutrition Plan:   Tolerating p o  diet    Discharge Plan:   Patient should be ready for discharge when medically clear    Initial Physical Therapy Recommendations:  Pending  Initial Occupational Therapy Recommendations: Pending  Initial /Plan:  Pending       Specific Diagnosis Plan:    Sepsis: Source:  UTI present on admission, Antibiotics:  Ceftriaxone, Length:  7 days    Need for Infectious Disease consult:  No      Spoke with Dr Heather Rooney  regarding transfer  Please call Critical Care Medicine with any questions or concerns  Portions of the record may have been created with voice recognition software  Occasional wrong word or "sound a like" substitutions may have occurred due to the inherent limitations of voice recognition software  Read the chart carefully and recognize, using context, where substitutions have occurred      TANVI Carrasco

## 2018-03-25 LAB
ANION GAP SERPL CALCULATED.3IONS-SCNC: 10 MMOL/L (ref 4–13)
APTT PPP: 155 SECONDS (ref 23–35)
APTT PPP: 39 SECONDS (ref 23–35)
APTT PPP: 58 SECONDS (ref 23–35)
BASOPHILS # BLD AUTO: 0.02 THOUSANDS/ΜL (ref 0–0.1)
BASOPHILS NFR BLD AUTO: 0 % (ref 0–1)
BUN SERPL-MCNC: 29 MG/DL (ref 5–25)
CALCIUM SERPL-MCNC: 9.5 MG/DL (ref 8.3–10.1)
CHLORIDE SERPL-SCNC: 101 MMOL/L (ref 100–108)
CO2 SERPL-SCNC: 28 MMOL/L (ref 21–32)
CREAT SERPL-MCNC: 1.49 MG/DL (ref 0.6–1.3)
EOSINOPHIL # BLD AUTO: 0.2 THOUSAND/ΜL (ref 0–0.61)
EOSINOPHIL NFR BLD AUTO: 3 % (ref 0–6)
ERYTHROCYTE [DISTWIDTH] IN BLOOD BY AUTOMATED COUNT: 12.3 % (ref 11.6–15.1)
GFR SERPL CREATININE-BSD FRML MDRD: 35 ML/MIN/1.73SQ M
GLUCOSE SERPL-MCNC: 138 MG/DL (ref 65–140)
HCT VFR BLD AUTO: 38.4 % (ref 34.8–46.1)
HGB BLD-MCNC: 12.5 G/DL (ref 11.5–15.4)
LYMPHOCYTES # BLD AUTO: 0.88 THOUSANDS/ΜL (ref 0.6–4.47)
LYMPHOCYTES NFR BLD AUTO: 12 % (ref 14–44)
MAGNESIUM SERPL-MCNC: 2.1 MG/DL (ref 1.6–2.6)
MCH RBC QN AUTO: 32.7 PG (ref 26.8–34.3)
MCHC RBC AUTO-ENTMCNC: 32.6 G/DL (ref 31.4–37.4)
MCV RBC AUTO: 101 FL (ref 82–98)
MONOCYTES # BLD AUTO: 0.66 THOUSAND/ΜL (ref 0.17–1.22)
MONOCYTES NFR BLD AUTO: 9 % (ref 4–12)
NEUTROPHILS # BLD AUTO: 5.54 THOUSANDS/ΜL (ref 1.85–7.62)
NEUTS SEG NFR BLD AUTO: 75 % (ref 43–75)
NRBC BLD AUTO-RTO: 0 /100 WBCS
PHOSPHATE SERPL-MCNC: 2.7 MG/DL (ref 2.3–4.1)
PLATELET # BLD AUTO: 160 THOUSANDS/UL (ref 149–390)
PMV BLD AUTO: 12.5 FL (ref 8.9–12.7)
POTASSIUM SERPL-SCNC: 4.4 MMOL/L (ref 3.5–5.3)
RBC # BLD AUTO: 3.82 MILLION/UL (ref 3.81–5.12)
SODIUM SERPL-SCNC: 139 MMOL/L (ref 136–145)
WBC # BLD AUTO: 7.37 THOUSAND/UL (ref 4.31–10.16)

## 2018-03-25 PROCEDURE — 85730 THROMBOPLASTIN TIME PARTIAL: CPT | Performed by: GENERAL PRACTICE

## 2018-03-25 PROCEDURE — 80048 BASIC METABOLIC PNL TOTAL CA: CPT | Performed by: NURSE PRACTITIONER

## 2018-03-25 PROCEDURE — 83735 ASSAY OF MAGNESIUM: CPT | Performed by: NURSE PRACTITIONER

## 2018-03-25 PROCEDURE — 85025 COMPLETE CBC W/AUTO DIFF WBC: CPT | Performed by: NURSE PRACTITIONER

## 2018-03-25 PROCEDURE — 84100 ASSAY OF PHOSPHORUS: CPT | Performed by: NURSE PRACTITIONER

## 2018-03-25 PROCEDURE — 99232 SBSQ HOSP IP/OBS MODERATE 35: CPT | Performed by: GENERAL PRACTICE

## 2018-03-25 PROCEDURE — 93005 ELECTROCARDIOGRAM TRACING: CPT

## 2018-03-25 RX ADMIN — BUTALBITAL, ACETAMINOPHEN AND CAFFEINE 1 TABLET: 50; 325; 40 TABLET ORAL at 07:59

## 2018-03-25 RX ADMIN — SENNOSIDES 8.6 MG: 8.6 TABLET, FILM COATED ORAL at 22:25

## 2018-03-25 RX ADMIN — METOPROLOL TARTRATE 50 MG: 50 TABLET ORAL at 08:00

## 2018-03-25 RX ADMIN — PRAVASTATIN SODIUM 40 MG: 40 TABLET ORAL at 16:31

## 2018-03-25 RX ADMIN — BUTALBITAL, ACETAMINOPHEN AND CAFFEINE 1 TABLET: 50; 325; 40 TABLET ORAL at 00:00

## 2018-03-25 RX ADMIN — MELATONIN TAB 3 MG 6 MG: 3 TAB at 22:25

## 2018-03-25 RX ADMIN — METOPROLOL TARTRATE 50 MG: 50 TABLET ORAL at 22:00

## 2018-03-25 RX ADMIN — CARBAMAZEPINE 100 MG: 100 TABLET, CHEWABLE ORAL at 22:00

## 2018-03-25 RX ADMIN — DILTIAZEM HYDROCHLORIDE 60 MG: 60 TABLET, FILM COATED ORAL at 17:38

## 2018-03-25 RX ADMIN — DILTIAZEM HYDROCHLORIDE 60 MG: 60 TABLET, FILM COATED ORAL at 06:21

## 2018-03-25 RX ADMIN — HEPARIN SODIUM 4000 UNITS: 1000 INJECTION, SOLUTION INTRAVENOUS; SUBCUTANEOUS at 07:12

## 2018-03-25 RX ADMIN — Medication 1000 MG: at 19:48

## 2018-03-25 RX ADMIN — HEPARIN SODIUM 2000 UNITS: 1000 INJECTION, SOLUTION INTRAVENOUS; SUBCUTANEOUS at 23:40

## 2018-03-25 RX ADMIN — ASPIRIN 81 MG: 81 TABLET, COATED ORAL at 08:00

## 2018-03-25 RX ADMIN — DOCUSATE SODIUM 100 MG: 100 CAPSULE, LIQUID FILLED ORAL at 17:38

## 2018-03-25 RX ADMIN — HEPARIN SODIUM AND DEXTROSE 17.8 UNITS/KG/HR: 10000; 5 INJECTION INTRAVENOUS at 07:12

## 2018-03-25 RX ADMIN — BUDESONIDE AND FORMOTEROL FUMARATE DIHYDRATE 2 PUFF: 160; 4.5 AEROSOL RESPIRATORY (INHALATION) at 08:00

## 2018-03-25 RX ADMIN — DIVALPROEX SODIUM 500 MG: 500 TABLET, EXTENDED RELEASE ORAL at 08:01

## 2018-03-25 RX ADMIN — ACETAMINOPHEN 650 MG: 325 TABLET, FILM COATED ORAL at 13:15

## 2018-03-25 RX ADMIN — BUPROPION HYDROCHLORIDE 150 MG: 150 TABLET, FILM COATED, EXTENDED RELEASE ORAL at 10:09

## 2018-03-25 RX ADMIN — CARBAMAZEPINE 100 MG: 100 TABLET, CHEWABLE ORAL at 16:31

## 2018-03-25 RX ADMIN — Medication 325 MG: at 17:38

## 2018-03-25 RX ADMIN — BUTALBITAL, ACETAMINOPHEN AND CAFFEINE 1 TABLET: 50; 325; 40 TABLET ORAL at 13:15

## 2018-03-25 RX ADMIN — Medication 325 MG: at 08:00

## 2018-03-25 RX ADMIN — DOCUSATE SODIUM 100 MG: 100 CAPSULE, LIQUID FILLED ORAL at 08:00

## 2018-03-25 RX ADMIN — CARBAMAZEPINE 100 MG: 100 TABLET, CHEWABLE ORAL at 08:01

## 2018-03-25 RX ADMIN — HEPARIN SODIUM AND DEXTROSE 17.8 UNITS/KG/HR: 10000; 5 INJECTION INTRAVENOUS at 14:12

## 2018-03-25 RX ADMIN — DILTIAZEM HYDROCHLORIDE 60 MG: 60 TABLET, FILM COATED ORAL at 13:15

## 2018-03-25 RX ADMIN — METOPROLOL TARTRATE 5 MG: 5 INJECTION, SOLUTION INTRAVENOUS at 07:56

## 2018-03-25 RX ADMIN — BUDESONIDE AND FORMOTEROL FUMARATE DIHYDRATE 2 PUFF: 160; 4.5 AEROSOL RESPIRATORY (INHALATION) at 17:38

## 2018-03-25 RX ADMIN — DILTIAZEM HYDROCHLORIDE 60 MG: 60 TABLET, FILM COATED ORAL at 23:44

## 2018-03-25 RX ADMIN — CHLORHEXIDINE GLUCONATE 15 ML: 1.2 RINSE ORAL at 08:00

## 2018-03-25 NOTE — PLAN OF CARE
CARDIOVASCULAR - ADULT     Maintains optimal cardiac output and hemodynamic stability Not Progressing     Absence of cardiac dysrhythmias or at baseline rhythm Not Progressing        DISCHARGE PLANNING - CARE MANAGEMENT     Discharge to post-acute care or home with appropriate resources Not Progressing        MUSCULOSKELETAL - ADULT     Maintain or return mobility to safest level of function Not Progressing     Maintain proper alignment of affected body part Not Progressing        Potential for Falls     Patient will remain free of falls Not Progressing        Prexisting or High Potential for Compromised Skin Integrity     Skin integrity is maintained or improved Not Progressing        RESPIRATORY - ADULT     Achieves optimal ventilation and oxygenation Not Progressing        SKIN/TISSUE INTEGRITY - ADULT     Skin integrity remains intact Not Progressing

## 2018-03-25 NOTE — CONSULTS
Consultation - Cardiology   Ezekiel Martinez 71 y o  female MRN: [id number]  Unit/Bed#:  Encounter: [id number]    Assessment/Plan     Assessment:  1  Atrial flutter  Plan:  1  Agree with cardizem for rate control  2  Will probably emily [facility] therapy  History of Present Illness   Physician Requesting Consult: Gisel Meléndez, *  Reason for Consult / Principal Problem: Atrial flutter  HPI: Ezekiel Martinez is a 71y o  year old female who presents with atrial flutter  While in ED patient became very anxious, desaturating with increased work of breathing  She was subsequently placed on BiPAP  Consults    Review of Systems   Respiratory: Positive for shortness of breath  Negative for chest tightness  Cardiovascular: Positive for palpitations  Negative for chest pain and leg swelling  Historical Information   Past Medical History:   Diagnosis Date    Brain aneurysm     CAD (coronary artery disease)     Cardiac disease     CHF (congestive heart failure) (HCC)     CKD (chronic kidney disease)     COPD (chronic obstructive pulmonary disease) (HCC)     Dementia     Dementia     Disease of thyroid gland     Hyperlipidemia     Hyperlipidemia     Hypertension     MI (myocardial infarction)     Migraine     Renal disorder     Seizures ([facility] [address] )     Stroke ([facility] [address] )      Past Surgical History:   Procedure Laterality Date    APPENDECTOMY      BLADDER TUMOR EXCISION      BRAIN SURGERY      CARDIAC SURGERY      CORONARY STENT PLACEMENT      5 stents    EYE SURGERY      MANDIBLE FRACTURE SURGERY      TONSILLECTOMY      TUBAL LIGATION      UTERINE FIBROID SURGERY       History   Alcohol Use No     History   Drug Use No     History   Smoking Status    Former Smoker   Smokeless Tobacco    Never Used     Family History: History reviewed  No pertinent family history      Meds/Allergies   current meds:   Current Facility-Administered Medications   Medication Dose Route Frequency    acetaminophen (TYLENOL) tablet 650 mg  650 mg Oral Q6H PRN    albuterol inhalation solution 2 5 mg  2 5 mg Nebulization Q6H PRN    aspirin (ECOTRIN LOW STRENGTH) EC tablet 81 mg  81 mg Oral Daily    budesonide-formoterol (SYMBICORT) 160-4 5 mcg/act inhaler 2 puff  2 puff Inhalation BID    buPROPion (WELLBUTRIN SR) 12 hr tablet 150 mg  150 mg Oral Daily    butalbital-acetaminophen-caffeine (FIORICET,ESGIC) -40 mg per tablet 1 tablet  1 tablet Oral Q4H PRN    carBAMazepine (TEGretol) chewable tablet 100 mg  100 mg Oral TID    ceftriaxone (ROCEPHIN) 1 g/50 mL in dextrose IVPB  1,000 mg Intravenous Q24H    diltiazem (CARDIZEM) tablet 60 mg  60 mg Oral Q6H TONI    divalproex sodium (DEPAKOTE ER) 24 hr tablet 500 mg  500 mg Oral Daily    docusate sodium (COLACE) capsule 100 mg  100 mg Oral BID    ferrous sulfate tablet 325 mg  325 mg Oral BID    heparin (porcine) 25,000 units in 250 mL infusion (premix)  3-20 Units/kg/hr (Order-Specific) Intravenous Titrated    heparin (porcine) injection 2,000 Units  2,000 Units Intravenous PRN    heparin (porcine) injection 4,000 Units  4,000 Units Intravenous PRN    melatonin tablet 6 mg  6 mg Oral HS    metoprolol (LOPRESSOR) injection 5 mg  5 mg Intravenous Q6H PRN    metoprolol tartrate (LOPRESSOR) tablet 50 mg  50 mg Oral Q12H TONI    pravastatin (PRAVACHOL) tablet 40 mg  40 mg Oral Daily With Dinner    senna (SENOKOT) tablet 8 6 mg  1 tablet Oral HS    and PTA meds:   Prior to Admission Medications   Prescriptions Last Dose Informant Patient Reported? Taking?    Cholecalciferol (VITAMIN D) 2000 units CAPS   Yes No   Sig: Take 1 capsule by mouth daily     LORazepam (ATIVAN) 0 5 mg tablet   Yes No   Sig: Take 0 5 mg by mouth 2 (two) times a day   Levothyroxine Sodium 88 MCG CAPS   Yes No   Sig: Take 88 mcg by mouth every morning     Sennosides-Docusate Sodium (SENNA PLUS PO)   Yes No   Sig: Take by mouth daily as needed   acetaminophen (TYLENOL) 500 mg tablet   Yes No   Sig: Take 500 mg by mouth every 6 (six) hours as needed for mild pain   albuterol (2 5 mg/3 mL) 0 083 % nebulizer solution   Yes No   Sig: Take 2 5 mg by nebulization every 6 (six) hours as needed for wheezing   albuterol (PROVENTIL HFA,VENTOLIN HFA) 90 mcg/act inhaler   Yes No   Sig: Inhale 2 puffs every 6 (six) hours as needed for wheezing   amLODIPine (NORVASC) 10 mg tablet   Yes No   Sig: Take 10 mg by mouth daily   aspirin (ECOTRIN LOW STRENGTH) 81 mg EC tablet   Yes No   Sig: Take 81 mg by mouth daily   buPROPion (WELLBUTRIN SR) 150 mg 12 hr tablet   Yes No   Sig: Take 150 mg by mouth daily   budesonide-formoterol (SYMBICORT) 160-4 5 mcg/act inhaler   Yes No   Sig: Inhale 2 puffs 2 (two) times a day   carBAMazepine (TEGretol) 100 mg chewable tablet   Yes No   Sig: Chew 100 mg 3 (three) times a day   cloNIDine (CATAPRES) 0 1 mg tablet   Yes No   Sig: Take 0 2 mg by mouth daily at bedtime   divalproex sodium (DEPAKOTE ER) 250 mg 24 hr tablet   Yes No   Sig: Take 500 mg by mouth daily   fenofibrate micronized (LOFIBRA) 67 MG capsule   Yes No   Sig: Take 67 mg by mouth every morning before breakfast   ferrous sulfate 325 (65 Fe) mg tablet   Yes No   Sig: Take 325 mg by mouth 2 (two) times a day   ipratropium-albuterol (DUO-NEB) 0 5-2 5 mg/mL   Yes No   Sig: Take 3 mL by nebulization every 6 (six) hours   metoprolol succinate (TOPROL-XL) 50 mg 24 hr tablet   No No   Sig: Take 1 tablet by mouth every morning   simvastatin (ZOCOR) 20 mg tablet   Yes No   Sig: Take 20 mg by mouth daily at bedtime      Facility-Administered Medications: None     Allergies   Allergen Reactions    Ramipril Anaphylaxis    Spiriva Handihaler [Tiotropium Bromide Monohydrate] Swelling    Penicillins        Objective   Vitals: Blood pressure 130/77, pulse (!) 128, temperature 98 3 °F (36 8 °C), temperature source Oral, resp  rate (!) 32, height 5' (1 524 m), weight 85 4 kg (188 lb 4 4 oz), SpO2 94 %    Orthostatic Blood Pressures    Flowsheet Row Most Recent Value   Blood Pressure  130/77 filed at 03/25/2018 1300   Patient Position - Orthostatic VS  Lying filed at 03/25/2018 0736            Intake/Output Summary (Last 24 hours) at 03/25/18 1559  Last data filed at 03/25/18 1007   Gross per 24 hour   Intake           223 37 ml   Output              400 ml   Net          -176 63 ml       Invasive Devices     Peripheral Intravenous Line            Peripheral IV 03/25/18 Left Arm less than 1 day          Drain            Urethral Catheter Latex 16 Fr  105 days                Physical Exam   Constitutional: She is oriented to person, place, and time  She appears well-developed and well-nourished  HENT:   Head: Normocephalic and atraumatic  Cardiovascular: S1 normal and S2 normal   An irregularly irregular rhythm present  Exam reveals no gallop and no friction rub  No murmur heard  Pulmonary/Chest: Effort normal and breath sounds normal    Neurological: She is alert and oriented to person, place, and time  Lab Results:   CBC with diff:   Results from last 7 days  Lab Units 03/25/18  0620   WBC Thousand/uL 7 37   RBC Million/uL 3 82   HEMOGLOBIN g/dL 12 5   HEMATOCRIT % 38 4   MCV fL 101*   MCH pg 32 7   MCHC g/dL 32 6   RDW % 12 3   MPV fL 12 5   PLATELETS Thousands/uL 160     CMP:   Results from last 7 days  Lab Units 03/25/18  0621  03/22/18  1809   SODIUM mmol/L 139  < > 145   POTASSIUM mmol/L 4 4  < > 3 9   CHLORIDE mmol/L 101  < > 108   CO2 mmol/L 28  < > 28   ANION GAP mmol/L 10  < > 9   BUN mg/dL 29*  < > 26*   CREATININE mg/dL 1 49*  < > 1 49*   GLUCOSE RANDOM mg/dL 138  < > 110   CALCIUM mg/dL 9 5  < > 9 2   AST U/L  --   --  17   ALT U/L  --   --  26   ALK PHOS U/L  --   --  52   TOTAL PROTEIN g/dL  --   --  7 1   BILIRUBIN TOTAL mg/dL  --   --  0 30   EGFR ml/min/1 73sq m 35  < > 35   < > = values in this interval not displayed    Troponin:   0  Lab Value Date/Time   TROPONINI <0 02 03/22/2018 2023   TROPONINI <0 02 03/22/2018 1710 TROPONINI 0 11 (H) 12/11/2017 0320   TROPONINI 0 11 (H) 12/11/2017 0104   TROPONINI 0 08 (H) 12/10/2017 2123   TROPONINI <0 02 11/21/2017 1748   TROPONINI <0 02 11/21/2017 1605     BNP:   Results from last 7 days  Lab Units 03/25/18  0621   SODIUM mmol/L 139   POTASSIUM mmol/L 4 4   CHLORIDE mmol/L 101   CO2 mmol/L 28   ANION GAP mmol/L 10   BUN mg/dL 29*   CREATININE mg/dL 1 49*   GLUCOSE RANDOM mg/dL 138   CALCIUM mg/dL 9 5   EGFR ml/min/1 73sq m 35     Coags:   Results from last 7 days  Lab Units 03/25/18  1445  03/22/18  1710   PTT seconds 155*  < > 30   INR   --   --  1 19*   < > = values in this interval not displayed  TSH:   Results from last 7 days  Lab Units 03/22/18  1809   TSH 3RD GENERATON uIU/mL 1 422     Magnesium:   Results from last 7 days  Lab Units 03/25/18  0621   MAGNESIUM mg/dL 2 1     Lipid Profile:     Imaging: I have personally reviewed pertinent reports  EKG: Atrial flutter with variable AV conduction  VTE Prophylaxis:     Code Status: Level 2 - DNAR: but accepts endotracheal intubation  Advance Directive and Living Will: Yes    Power of : Yes  POLST:      Counseling / Coordination of Care  Total floor / unit time spent today 60 minutes  Greater than 50% of total time was spent with the patient and / or family counseling and / or coordination of care  A description of the counseling / coordination of care: 61

## 2018-03-25 NOTE — PLAN OF CARE
CARDIOVASCULAR - ADULT     Maintains optimal cardiac output and hemodynamic stability Progressing     Absence of cardiac dysrhythmias or at baseline rhythm Progressing        DISCHARGE PLANNING - CARE MANAGEMENT     Discharge to post-acute care or home with appropriate resources Progressing        MUSCULOSKELETAL - ADULT     Maintain or return mobility to safest level of function Progressing     Maintain proper alignment of affected body part Progressing        Potential for Falls     Patient will remain free of falls Progressing        Prexisting or High Potential for Compromised Skin Integrity     Skin integrity is maintained or improved Progressing        RESPIRATORY - ADULT     Achieves optimal ventilation and oxygenation Progressing        SKIN/TISSUE INTEGRITY - ADULT     Skin integrity remains intact Progressing

## 2018-03-25 NOTE — PROGRESS NOTES
[facility] Progress Note  Patient: Kylah Bullard 71 y o  female   MRN: [id number]  PCP: Josephine August DO  Unit/Bed#:  Encounter: [id number]  Date Of Visit: 03/25/18    Assessment:    Principal Problem:    Acute on chronic systolic CHF (congestive heart failure) ([facility])  Active Problems:    HTN (hypertension)    COPD (chronic obstructive pulmonary disease) ([facility] )    New onset atrial flutter (HCC)    Back pain    Dementia    HLD (hyperlipidemia)    UTI (urinary tract infection)    Seizure disorder ([facility])    RSV infection      Plan:      History of seizure disorder:  Continue carbapine and Depakote  Dementia:  Remains high risk for acute delirium, maintain frequent reorientation monitoring, encourage good sleep hygiene, melatonin added           CV:   Acute on chronic CHF:  improved cxr without chf; no on outpatient diureti  New atrial flutter: Cardizem 60 mg p o  q 6 hours, beta-blocker; on heparin gtt; echo     12/17 LEFT VENTRICLE:  Systolic function was normal  Ejection fraction was estimated in the range of 55 % to 65 %  There were no regional wall motion abnormalities  There was mild concentric hypertrophy  Doppler parameters were consistent with abnormal left ventricular relaxation (grade 1 diastolic dysfunction)      MITRAL VALVE:  There was mild annular calcification  There was mild regurgitation      AORTIC VALVE:  There was mild regurgitation      TRICUSPID VALVE:  There was mild regurgitation      PULMONIC VALVE:  There was mild regurgitation    Hypertension:  Lopressor increased to 50 mg b i d  p o              ID:  RSV positive:  Maintain droplet precautions    UTI enterococcus and stenotrophomonas-ID consult on rocephin for now      Endo:  Monitor thyroid and blood glucose                         VTE Pharmacologic Prophylaxis:   Pharmacologic: Heparin Drip  Mechanical VTE Prophylaxis in Place: Yes    Patient Centered Rounds: I have performed bedside rounds with nursing staff today     Discussions with Specialists or Other Care Team Provider:     Education and Discussions with Family / Patient:     Time Spent for Care: 30 minutes  More than 50% of total time spent on counseling and coordination of care as described above  Current Length of Stay: 3 day(s)    Current Patient Status: Inpatient   Certification Statement: The patient will continue to require additional inpatient hospital stay due to chf    Discharge Plan: PT consult    Code Status: Level 2 - DNAR: but accepts endotracheal intubation      Subjective:   Breathing much better    Objective:     Vitals:   Temp (24hrs), Av 9 °F (37 2 °C), Min:98 5 °F (36 9 °C), Max:99 6 °F (37 6 °C)    HR:  [] 98  Resp:  [19-43] 19  BP: (117-177)/() 127/90  SpO2:  [67 %-96 %] 93 %  Body mass index is 36 77 kg/m²  Input and Output Summary (last 24 hours): Intake/Output Summary (Last 24 hours) at 18 1113  Last data filed at 18 1007   Gross per 24 hour   Intake           263 37 ml   Output              400 ml   Net          -136 63 ml       Physical Exam:     Physical Exam   Constitutional: She appears well-developed and well-nourished  HENT:   Head: Normocephalic and atraumatic  Eyes: Pupils are equal, round, and reactive to light  Cardiovascular: Normal rate and regular rhythm  Pulmonary/Chest: Effort normal and breath sounds normal    Musculoskeletal: She exhibits no edema         Additional Data:     Labs:      Results from last 7 days  Lab Units 18  0620   WBC Thousand/uL 7 37   HEMOGLOBIN g/dL 12 5   HEMATOCRIT % 38 4   PLATELETS Thousands/uL 160   NEUTROS PCT % 75   LYMPHS PCT % 12*   MONOS PCT % 9   EOS PCT % 3       Results from last 7 days  Lab Units 18  0621  18  1809   SODIUM mmol/L 139  < > 145   POTASSIUM mmol/L 4 4  < > 3 9   CHLORIDE mmol/L 101  < > 108   CO2 mmol/L 28  < > 28   BUN mg/dL 29*  < > 26*   CREATININE mg/dL 1 49*  < > 1 49*   CALCIUM mg/dL 9 5  < > 9 2 TOTAL PROTEIN g/dL  --   --  7 1   BILIRUBIN TOTAL mg/dL  --   --  0 30   ALK PHOS U/L  --   --  52   ALT U/L  --   --  26   AST U/L  --   --  17   GLUCOSE RANDOM mg/dL 138  < > 110   < > = values in this interval not displayed  Results from last 7 days  Lab Units 03/22/18  1710   INR  1 19*       * I Have Reviewed All Lab Data Listed Above  * Additional Pertinent Lab Tests Reviewed:  All Labs Within Last 24 Hours Reviewed    Imaging:    Imaging Reports Reviewed Today Include:   Imaging Personally Reviewed by Myself Includes:      Recent Cultures (last 7 days):       Results from last 7 days  Lab Units 03/22/18  2256 03/22/18  1925   URINE CULTURE   --  >100,000 cfu/ml Enterococcus faecalis*  40,000-49,000 cfu/ml Stenotrophomonas maltophilia*   INFLUENZA A PCR  None Detected  --    INFLUENZA B PCR  None Detected  --    RSV PCR  Detected*  --        Last 24 Hours Medication List:     Current Facility-Administered Medications:  acetaminophen 650 mg Oral Q6H PRN TANVI Mcghee    albuterol 2 5 mg Nebulization Q6H PRN Shea Jones PA-C    aspirin 81 mg Oral Daily Shea Jones PA-C    budesonide-formoterol 2 puff Inhalation BID Shea Jones PA-C    buPROPion 150 mg Oral Daily Shea Jones PA-C    butalbital-acetaminophen-caffeine 1 tablet Oral Q4H PRN TANVI Mcghee    carBAMazepine 100 mg Oral TID Shea Jones PA-C    cefTRIAXone 1,000 mg Intravenous Q24H Shea Jones PA-C Last Rate: 1,000 mg (03/24/18 2121)   chlorhexidine 15 mL Swish & Spit Q12H [facility] Shea Jones PA-C    diltiazem 60 mg Oral Q6H [facility] Shea Jones PA-C    divalproex sodium 500 mg Oral Daily Shea Jones PA-C    docusate sodium 100 mg Oral BID Shea Jones PA-C    ferrous sulfate 325 mg Oral BID Shea Jones PA-C    heparin (porcine) 3-20 Units/kg/hr (Order-Specific) Intravenous Titrated Angy Herbert DO Last Rate: 17 8 Units/kg/hr (03/25/18 8442)   heparin (porcine) 2,000 Units Intravenous PRN Neel Herbert DO    heparin (porcine) 4,000 Units Intravenous PRN Meliton Herbert DO    melatonin 6 mg Oral HS Kristin Perez PA-C    metoprolol 5 mg Intravenous Q6H PRN TANVI Macdonald    metoprolol tartrate 50 mg Oral Q12H [address] , FELIZ    pravastatin 40 mg Oral Daily With Saeid Colón PA-C    senna 1 tablet Oral HS Katerin Hankins PA-C         Today, Patient Was Seen By: Deborah Aaron MD    ** Please Note: Dragon 360 Dictation voice to text software may have been used in the creation of this document   **

## 2018-03-26 ENCOUNTER — APPOINTMENT (INPATIENT)
Dept: CT IMAGING | Facility: HOSPITAL | Age: 73
DRG: 308 | End: 2018-03-26
Payer: MEDICARE

## 2018-03-26 ENCOUNTER — APPOINTMENT (INPATIENT)
Dept: RADIOLOGY | Facility: HOSPITAL | Age: 73
DRG: 308 | End: 2018-03-26
Payer: MEDICARE

## 2018-03-26 LAB
ANION GAP SERPL CALCULATED.3IONS-SCNC: 8 MMOL/L (ref 4–13)
APTT PPP: 128 SECONDS (ref 23–35)
APTT PPP: 34 SECONDS (ref 23–35)
APTT PPP: 80 SECONDS (ref 23–35)
ATRIAL RATE: 278 BPM
BASOPHILS # BLD AUTO: 0.04 THOUSANDS/ΜL (ref 0–0.1)
BASOPHILS NFR BLD AUTO: 1 % (ref 0–1)
BUN SERPL-MCNC: 27 MG/DL (ref 5–25)
CALCIUM SERPL-MCNC: 9.1 MG/DL (ref 8.3–10.1)
CHLORIDE SERPL-SCNC: 102 MMOL/L (ref 100–108)
CO2 SERPL-SCNC: 29 MMOL/L (ref 21–32)
CREAT SERPL-MCNC: 1.3 MG/DL (ref 0.6–1.3)
EOSINOPHIL # BLD AUTO: 0.26 THOUSAND/ΜL (ref 0–0.61)
EOSINOPHIL NFR BLD AUTO: 5 % (ref 0–6)
ERYTHROCYTE [DISTWIDTH] IN BLOOD BY AUTOMATED COUNT: 12.5 % (ref 11.6–15.1)
GFR SERPL CREATININE-BSD FRML MDRD: 41 ML/MIN/1.73SQ M
GLUCOSE SERPL-MCNC: 117 MG/DL (ref 65–140)
HCT VFR BLD AUTO: 35 % (ref 34.8–46.1)
HGB BLD-MCNC: 11.5 G/DL (ref 11.5–15.4)
LYMPHOCYTES # BLD AUTO: 1.62 THOUSANDS/ΜL (ref 0.6–4.47)
LYMPHOCYTES NFR BLD AUTO: 29 % (ref 14–44)
MAGNESIUM SERPL-MCNC: 2.1 MG/DL (ref 1.6–2.6)
MCH RBC QN AUTO: 33.2 PG (ref 26.8–34.3)
MCHC RBC AUTO-ENTMCNC: 32.9 G/DL (ref 31.4–37.4)
MCV RBC AUTO: 101 FL (ref 82–98)
MONOCYTES # BLD AUTO: 0.8 THOUSAND/ΜL (ref 0.17–1.22)
MONOCYTES NFR BLD AUTO: 14 % (ref 4–12)
NEUTROPHILS # BLD AUTO: 2.77 THOUSANDS/ΜL (ref 1.85–7.62)
NEUTS SEG NFR BLD AUTO: 50 % (ref 43–75)
NRBC BLD AUTO-RTO: 0 /100 WBCS
PHOSPHATE SERPL-MCNC: 3.4 MG/DL (ref 2.3–4.1)
PLATELET # BLD AUTO: 161 THOUSANDS/UL (ref 149–390)
PMV BLD AUTO: 12.6 FL (ref 8.9–12.7)
POTASSIUM SERPL-SCNC: 4.4 MMOL/L (ref 3.5–5.3)
QRS AXIS: 15 DEGREES
QRSD INTERVAL: 98 MS
QT INTERVAL: 390 MS
QTC INTERVAL: 580 MS
RBC # BLD AUTO: 3.46 MILLION/UL (ref 3.81–5.12)
SODIUM SERPL-SCNC: 139 MMOL/L (ref 136–145)
T WAVE AXIS: 80 DEGREES
VENTRICULAR RATE: 133 BPM
WBC # BLD AUTO: 5.59 THOUSAND/UL (ref 4.31–10.16)

## 2018-03-26 PROCEDURE — 99232 SBSQ HOSP IP/OBS MODERATE 35: CPT | Performed by: GENERAL PRACTICE

## 2018-03-26 PROCEDURE — 71046 X-RAY EXAM CHEST 2 VIEWS: CPT

## 2018-03-26 PROCEDURE — G8978 MOBILITY CURRENT STATUS: HCPCS

## 2018-03-26 PROCEDURE — 85730 THROMBOPLASTIN TIME PARTIAL: CPT | Performed by: GENERAL PRACTICE

## 2018-03-26 PROCEDURE — 93010 ELECTROCARDIOGRAM REPORT: CPT | Performed by: INTERNAL MEDICINE

## 2018-03-26 PROCEDURE — 84100 ASSAY OF PHOSPHORUS: CPT | Performed by: NURSE PRACTITIONER

## 2018-03-26 PROCEDURE — 97163 PT EVAL HIGH COMPLEX 45 MIN: CPT

## 2018-03-26 PROCEDURE — 83735 ASSAY OF MAGNESIUM: CPT | Performed by: NURSE PRACTITIONER

## 2018-03-26 PROCEDURE — 99222 1ST HOSP IP/OBS MODERATE 55: CPT | Performed by: INTERNAL MEDICINE

## 2018-03-26 PROCEDURE — G8979 MOBILITY GOAL STATUS: HCPCS

## 2018-03-26 PROCEDURE — 85025 COMPLETE CBC W/AUTO DIFF WBC: CPT | Performed by: NURSE PRACTITIONER

## 2018-03-26 PROCEDURE — 80048 BASIC METABOLIC PNL TOTAL CA: CPT | Performed by: NURSE PRACTITIONER

## 2018-03-26 PROCEDURE — 70450 CT HEAD/BRAIN W/O DYE: CPT

## 2018-03-26 RX ORDER — FUROSEMIDE 10 MG/ML
20 INJECTION INTRAMUSCULAR; INTRAVENOUS
Status: DISCONTINUED | OUTPATIENT
Start: 2018-03-27 | End: 2018-03-29

## 2018-03-26 RX ORDER — WARFARIN SODIUM 5 MG/1
5 TABLET ORAL
Status: DISCONTINUED | OUTPATIENT
Start: 2018-03-26 | End: 2018-03-27

## 2018-03-26 RX ADMIN — CARBAMAZEPINE 100 MG: 100 TABLET, CHEWABLE ORAL at 21:47

## 2018-03-26 RX ADMIN — DILTIAZEM HYDROCHLORIDE 60 MG: 60 TABLET, FILM COATED ORAL at 11:17

## 2018-03-26 RX ADMIN — DIVALPROEX SODIUM 500 MG: 500 TABLET, EXTENDED RELEASE ORAL at 09:42

## 2018-03-26 RX ADMIN — BUDESONIDE AND FORMOTEROL FUMARATE DIHYDRATE 2 PUFF: 160; 4.5 AEROSOL RESPIRATORY (INHALATION) at 17:08

## 2018-03-26 RX ADMIN — Medication 325 MG: at 17:04

## 2018-03-26 RX ADMIN — DILTIAZEM HYDROCHLORIDE 60 MG: 60 TABLET, FILM COATED ORAL at 06:49

## 2018-03-26 RX ADMIN — SENNOSIDES 8.6 MG: 8.6 TABLET, FILM COATED ORAL at 21:47

## 2018-03-26 RX ADMIN — METOPROLOL TARTRATE 50 MG: 50 TABLET ORAL at 09:40

## 2018-03-26 RX ADMIN — ACETAMINOPHEN 650 MG: 325 TABLET, FILM COATED ORAL at 09:40

## 2018-03-26 RX ADMIN — HEPARIN SODIUM 4000 UNITS: 1000 INJECTION, SOLUTION INTRAVENOUS; SUBCUTANEOUS at 12:49

## 2018-03-26 RX ADMIN — HEPARIN SODIUM AND DEXTROSE 17.8 UNITS/KG/HR: 10000; 5 INJECTION INTRAVENOUS at 13:34

## 2018-03-26 RX ADMIN — DOCUSATE SODIUM 100 MG: 100 CAPSULE, LIQUID FILLED ORAL at 09:40

## 2018-03-26 RX ADMIN — Medication 325 MG: at 09:40

## 2018-03-26 RX ADMIN — BUDESONIDE AND FORMOTEROL FUMARATE DIHYDRATE 2 PUFF: 160; 4.5 AEROSOL RESPIRATORY (INHALATION) at 10:49

## 2018-03-26 RX ADMIN — ASPIRIN 81 MG: 81 TABLET, COATED ORAL at 09:40

## 2018-03-26 RX ADMIN — DILTIAZEM HYDROCHLORIDE 60 MG: 60 TABLET, FILM COATED ORAL at 17:08

## 2018-03-26 RX ADMIN — BUPROPION HYDROCHLORIDE 150 MG: 150 TABLET, FILM COATED, EXTENDED RELEASE ORAL at 09:40

## 2018-03-26 RX ADMIN — WARFARIN SODIUM 5 MG: 5 TABLET ORAL at 17:05

## 2018-03-26 RX ADMIN — CARBAMAZEPINE 100 MG: 100 TABLET, CHEWABLE ORAL at 09:39

## 2018-03-26 RX ADMIN — PRAVASTATIN SODIUM 40 MG: 40 TABLET ORAL at 17:05

## 2018-03-26 RX ADMIN — METOPROLOL TARTRATE 50 MG: 50 TABLET ORAL at 21:51

## 2018-03-26 RX ADMIN — DOCUSATE SODIUM 100 MG: 100 CAPSULE, LIQUID FILLED ORAL at 17:05

## 2018-03-26 RX ADMIN — CARBAMAZEPINE 100 MG: 100 TABLET, CHEWABLE ORAL at 17:05

## 2018-03-26 RX ADMIN — MELATONIN TAB 3 MG 6 MG: 3 TAB at 21:47

## 2018-03-26 NOTE — CASE MANAGEMENT
Continued Stay Review    Date: 3/26    Vital Signs: /79 (BP Location: Right arm)   Pulse (!) 115   Temp 98 1 °F (36 7 °C) (Oral)   Resp 20   Ht 5' (1 524 m)   Wt 85 4 kg (188 lb 4 4 oz)   SpO2 90%   BMI 36 77 kg/m²     Medications:   Scheduled Meds:   Current Facility-Administered Medications:  acetaminophen 650 mg Oral Q6H PRN TANVI Valverde    albuterol 2 5 mg Nebulization Q6H PRN Viry Duran PA-C    aspirin 81 mg Oral Daily Viry Duran PA-C    budesonide-formoterol 2 puff Inhalation BID Viry Duran PA-C    buPROPion 150 mg Oral Daily Viry Duran PA-C    butalbital-acetaminophen-caffeine 1 tablet Oral Q4H PRN TANVI Valverde    carBAMazepine 100 mg Oral TID Vriy Duran PA-C    diltiazem 60 mg Oral Q6H [address] Viry Duran PA-C    divalproex sodium 500 mg Oral Daily Virysharmin Duran PA-C    docusate sodium 100 mg Oral BID Virysharmin Duran PA-C    ferrous sulfate 325 mg Oral BID Virysharmin Duran PA-C    heparin (porcine) 3-20 Units/kg/hr (Order-Specific) Intravenous Titrated Leeanna Montes DO Last Rate: 17 8 Units/kg/hr (03/26/18 1334)   heparin (porcine) 2,000 Units Intravenous PRN Neel Herbert, DO    heparin (porcine) 4,000 Units Intravenous PRN Neel Herbert, DO    melatonin 6 mg Oral HS Savanah Leung PA-C    metoprolol 5 mg Intravenous Q6H PRN TANVI Valverde    metoprolol tartrate 50 mg Oral Q12H [address] LoreathMORENA De La Cruz-CECILIA    pravastatin 40 mg Oral Daily With TransMontFELIZ mcintosh    senna 1 tablet Oral HS Viry Duran PA-C    warfarin 5 mg Oral Daily (warfarin) Jackelyn Meléndez MD      Continuous Infusions:   heparin (porcine) 3-20 Units/kg/hr (Order-Specific) Last Rate: 17 8 Units/kg/hr (03/26/18 1334)     PRN Meds:   acetaminophen    albuterol    butalbital-acetaminophen-caffeine    heparin (porcine)    heparin (porcine)    metoprolol    Abnormal Labs/Diagnostic Results: BUN creat 27 1 30    Age/Sex: 71 y o  female     Assessment/Plan: Assessment:     Principal Problem:    Acute on chronic systolic CHF (congestive heart failure) (HCC)  Active Problems:    HTN (hypertension)    COPD (chronic obstructive pulmonary disease) ([facility] [address] )    New onset atrial flutter (HCC)    Back pain    Dementia    HLD (hyperlipidemia)    UTI (urinary tract infection)    Seizure disorder (HCC)    RSV infection   Plan:  Headache this morning-h/o aneurysm clipping; PTT high-ct head ordered and negative  Acute on chronic CHF:  improved cxr without chf but follow up cxr pending; not on outpatient diuretic cardiology following  New atrial flutter: Cardizem 60 mg p o  q 6 hours, beta-blocker increased as rate increased today; on heparin gtt willl need PO anticoagulation-start coumadin; tsh normal;   Echo 12/17 LEFT VENTRICLE:  Systolic function was normal  Ejection fraction was estimated in the range of 55 % to 65 %  There were no regional wall motion abnormalities  There was mild concentric hypertrophy  Doppler parameters were consistent with abnormal left ventricular relaxation (grade 1 diastolic dysfunction)    MITRAL VALVE:  There was mild annular calcification  There was mild regurgitation    AORTIC VALVE:  There was mild regurgitation    TRICUSPID VALVE:  There was mild regurgitation    PULMONIC VALVE:  There was mild regurgitation  Hypertension:  Lopressor increased to 50 mg b i d  p o    IRSV positive:  Maintain droplet precautions   UC with  enterococcus and stenotrophomonas-ID consult appreciated off rocephin   VTE Pharmacologic Prophylaxis:   Pharmacologic: Heparin Drip  Mechanical VTE Prophylaxis in Place: Yes   Patient Centered Rounds: I have performed bedside rounds with nursing staff today    Discussions with Specialists or Other Care Team Provider:    Education and Discussions with Family / Patient:    Time Spent for Care: 30 minutes    More than 50% of total time spent on counseling and coordination of care as described above    Current Length of Stay: 4 day(s)   Current Patient Status: Inpatient   Certification Statement: The patient will continue to require additional inpatient hospital stay due to [address]: PT/OT consults      ID note 3/26  IMPRESSION & RECOMMENDATIONS:   1  Acute hypoxic respiratory failure-no clinical or radiographic evidence of pneumonia  Perhaps the patient's RSV infection led to atrial fibrillation with rapid response which ultimately led to acute CHF and respiratory failure  Patient is clinically improved  -no antibiotics for now  -discontinue ceftriaxone  -diuresis and rate control  -no additional ID workup for now     2  Positive urine culture-in a patient with chronic indwelling Davis catheter in place  No clinical evidence of an invasive infectious process at this time  As expected, the patient has bacteriuria in the setting of a chronic indwelling Davis catheter   -discontinue ceftriaxone  -no antibiotics  -no additional ID workup for now     3  Acute CHF-clinically improved with diuresis and rate control   -cardiology follow-up  -volume management  -rate control the CHF     4    COPD-no current evidence of an acute exacerbation

## 2018-03-26 NOTE — PLAN OF CARE
Problem: DISCHARGE PLANNING - CARE MANAGEMENT  Goal: Discharge to post-acute care or home with appropriate resources  INTERVENTIONS:  - Conduct assessment to determine patient/family and health care team treatment goals, and need for post-acute services based on payer coverage, community resources, and patient preferences, and barriers to discharge  - Address psychosocial, clinical, and financial barriers to discharge as identified in assessment in conjunction with the patient/family and health care team  - Arrange appropriate level of post-acute services according to patients   needs and preference and payer coverage in collaboration with the physician and health care team  - Communicate with and update the patient/family, physician, and health care team regarding progress on the discharge plan  - Arrange appropriate transportation to post-acute venues   Outcome: Progressing  Patient to return to [facility] or [facility]

## 2018-03-26 NOTE — PLAN OF CARE
Problem: PHYSICAL THERAPY ADULT  Goal: Performs mobility at highest level of function for planned discharge setting  See evaluation for individualized goals  Treatment/Interventions: Functional transfer training, LE strengthening/ROM, Elevations, Therapeutic exercise, Endurance training, Patient/family training, Bed mobility, Gait training, Spoke to nursing, Family, Spoke to case management          See flowsheet documentation for full assessment, interventions and recommendations  Prognosis: Good  Problem List: Decreased strength, Decreased endurance, Impaired balance, Decreased mobility, Decreased cognition, Decreased safety awareness, Impaired judgement  Assessment: pt is a 73y/o f who presents to [facility] c back pain  dx: RSV + new onset a-flutter  currently on droplet precautions  PMH significant for dementia, COPD, CHF, a-flutter, + seizures  at baseline, pt mod (I) c functional mobility c RW + able to negotiate stairs  resides at [facility]  currently requires min (A)x1 for functional mobility tasks 2* deficits in strength, balance, gait quality, cognition, + activity tolerance noted in PT exam above  Barthel Index 45/100  ambulated 40' c RW limited by fatigue c pt stating, "I better sit down now"  min verbal cues required to remain safe distance c in JENNYFER of RW  returned to chair c chair alarm activated at end of session  would benefit from skilled PT to maximize functional mobility + improve quality of life  upon d/c, recommend STR vs return to skilled nursing c PT pending skilled nursing's ability to care for pt at current level of functional mobility  PT eval of high complexity 2* unstable med status c pt requiring ongoing medical management 2* RSV  remains on droplet precautions c multiple lines  presents c multiple co-morbidities impacting PT including new onset a-flutter + dementia  presents c mobility deficits above requiring min (A)x1 for mobility tasks  will need to negotiate full flight of stairs upon return to [facility]    Barriers to Discharge: Inaccessible home environment     Recommendation: Short-term skilled PT, Home PT (STR if pt unable to return to [facility])     PT - OK to Discharge: Yes (to STR or longterm c PT)    See flowsheet documentation for full assessment

## 2018-03-26 NOTE — PROGRESS NOTES
Pt complained of TRIPLETT 6/10, Dr Shelly Thomson ordered a stat CT of head and verbally ordered nurse to hold heparin drip until results are read

## 2018-03-26 NOTE — CONSULTS
Consultation - Infectious Disease   Amita Pierce 71 y o  female MRN: [id number]  Unit/Bed#: -01 Encounter: [id number]      IMPRESSION & RECOMMENDATIONS:   1  Acute hypoxic respiratory failure-no clinical or radiographic evidence of pneumonia  Perhaps the patient's RSV infection led to atrial fibrillation with rapid response which ultimately led to acute CHF and respiratory failure  Patient is clinically improved  -no antibiotics for now  -discontinue ceftriaxone  -diuresis and rate control  -no additional ID workup for now    2  Positive urine culture-in a patient with chronic indwelling Davis catheter in place  No clinical evidence of an invasive infectious process at this time  As expected, the patient has bacteriuria in the setting of a chronic indwelling Davis catheter   -discontinue ceftriaxone  -no antibiotics  -no additional ID workup for now    3  Acute CHF-clinically improved with diuresis and rate control   -cardiology follow-up  -volume management  -rate control the CHF    4  COPD-no current evidence of an acute exacerbation    HISTORY OF PRESENT ILLNESS:  Reason for Consult:  UTI  HPI: Amita Pierce is a 71y o  year old female with a history of a chronic indwelling Davis catheter in place admitted to [facility] on the 22nd of March with back pain and found to have an acute CHF exacerbation who I am asked to assist with management  The patient does not recall exactly why she was admitted  She apparently developed some back pain and was sent to the ER for further evaluation  Evaluation the emergency department the patient was found to have atrial fibrillation with rapid ventricular response  She apparently also had clinical evidence of an acute CHF exacerbation and was treated with diuresis and heart rate control  Her symptoms significantly improved and she is no longer having any back pain    She had no urinalysis done on presentation which revealed pyuria and bacteriuria and she was therefore diagnosed with UTI and started on ceftriaxone  During the patient's brief hospital stay she has been remained afebrile and without leukocytosis  She has also remained hemodynamically stable  She denies any fever chills or sweats, denies any nausea vomiting or diarrhea, denies any dysuria or hematuria, denies any current cough or shortness of breath    REVIEW OF SYSTEMS:  A complete 12 point system-based review of systems is otherwise negative  PAST MEDICAL HISTORY:  Past Medical History:   Diagnosis Date    Brain aneurysm     CAD (coronary artery disease)     Cardiac disease     CHF (congestive heart failure) (HCC)     CKD (chronic kidney disease)     COPD (chronic obstructive pulmonary disease) (HCC)     Dementia     Dementia     Disease of thyroid gland     Hyperlipidemia     Hyperlipidemia     Hypertension     MI (myocardial infarction)     Migraine     Renal disorder     [facility]     Stroke ([facility] [address] )      Past Surgical History:   Procedure Laterality Date    APPENDECTOMY      BLADDER TUMOR EXCISION      BRAIN SURGERY      CARDIAC SURGERY      CORONARY STENT PLACEMENT      5 stents    EYE SURGERY      MANDIBLE FRACTURE SURGERY      TONSILLECTOMY      TUBAL LIGATION      UTERINE FIBROID SURGERY         FAMILY HISTORY:  Non-contributory    SOCIAL HISTORY:  Social History   History   Alcohol Use No     History   Drug Use No     History   Smoking Status    Former Smoker   Smokeless Tobacco    Never Used       ALLERGIES:  Allergies   Allergen Reactions    Ramipril Anaphylaxis    Spiriva Handihaler [Tiotropium Bromide Monohydrate] Swelling    Penicillins        MEDICATIONS:  All current active medications have been reviewed    Antibiotics:  Ceftriaxone 5    PHYSICAL EXAM:  HR:  [105-128] 115  Resp:  [20-32] 20  BP: ()/(70-84) 139/79  SpO2:  [90 %-97 %] 90 %  Temp (24hrs), Av 3 °F (36 8 °C), Min:98 °F (36 7 °C), Max:98 5 °F (36 9 °C)  Current: Temperature: 98 1 °F (36 7 °C)    Intake/Output Summary (Last 24 hours) at 03/26/18 1103  Last data filed at 03/26/18 0601   Gross per 24 hour   Intake                0 ml   Output              425 ml   Net             -425 ml       General Appearance:  Appearing well, nontoxic, and in no distress   Head:  Normocephalic, without obvious abnormality, atraumatic   Eyes:  Conjunctiva pink and sclera anicteric, both eyes   Nose: Nares normal, mucosa normal, no drainage   Throat: Oropharynx moist without lesions   Neck: Supple, symmetrical, no adenopathy, no tenderness/mass/nodules   Back:   Symmetric, no curvature, ROM normal, no CVA tenderness   Lungs:   Decreased breath sounds bilaterally, few crackles at the bases, respirations unlabored   Chest Wall:  No tenderness or deformity   Heart:  Tachycardic; no murmur, rub or gallop   Abdomen:   Soft, non-tender, non-distended, positive bowel sounds    Extremities: No cyanosis, clubbing or edema   Skin: No rashes or lesions  No draining wounds noted  Lymph nodes: Cervical, supraclavicular nodes normal   Neurologic: Alert, answer simple questions appropriately, able to move all 4 extremities       LABS, IMAGING, & OTHER STUDIES:  Lab Results:  I have personally reviewed pertinent labs      Results from last 7 days  Lab Units 03/26/18  0559 03/25/18  0620 03/24/18  0345   WBC Thousand/uL 5 59 7 37 5 88   HEMOGLOBIN g/dL 11 5 12 5 11 4*   PLATELETS Thousands/uL 161 160 125*       Results from last 7 days  Lab Units 03/26/18  0559 03/25/18  0621 03/24/18  0345  03/22/18  1809   SODIUM mmol/L 139 139 141  < > 145   POTASSIUM mmol/L 4 4 4 4 3 6  < > 3 9   CHLORIDE mmol/L 102 101 101  < > 108   CO2 mmol/L 29 28 32  < > 28   ANION GAP mmol/L 8 10 8  < > 9   BUN mg/dL 27* 29* 32*  < > 26*   CREATININE mg/dL 1 30 1 49* 1 68*  < > 1 49*   EGFR ml/min/1 73sq m 41 35 30  < > 35   GLUCOSE RANDOM mg/dL 117 138 117  < > 110   CALCIUM mg/dL 9 1 9 5 8 7  < > 9 2   AST U/L  -- --   --   --  17   ALT U/L  --   --   --   --  26   ALK PHOS U/L  --   --   --   --  52   TOTAL PROTEIN g/dL  --   --   --   --  7 1   BILIRUBIN TOTAL mg/dL  --   --   --   --  0 30   < > = values in this interval not displayed  Results from last 7 days  Lab Units 03/22/18  8296 03/22/18  1925   URINE CULTURE   --  >100,000 cfu/ml Enterococcus faecalis*  40,000-49,000 cfu/ml Stenotrophomonas maltophilia*   INFLUENZA A PCR  None Detected  --    INFLUENZA B PCR  None Detected  --    RSV PCR  Detected*  --        Imaging Studies:   I have personally reviewed pertinent imaging study reports and images in PACS  Chest x-ray- chronic changes without any acute consolidation    CT chest abdomen pelvis-  Stable fat-containing ventral hernia  Stable gallstones without surrounding inflammation  Stable cardiomegaly  Stable small hiatal hernia

## 2018-03-26 NOTE — PHYSICAL THERAPY NOTE
PT Evaluation (25min)  (15:10-15:35)    Past Medical History:   Diagnosis Date    Brain aneurysm     CAD (coronary artery disease)     Cardiac disease     CHF (congestive heart failure) (HCC)     CKD (chronic kidney disease)     COPD (chronic obstructive pulmonary disease) (HCC)     Dementia     Dementia     Disease of thyroid gland     Hyperlipidemia     Hyperlipidemia     Hypertension     MI (myocardial infarction)     Migraine     Renal disorder     Seizures ([facility]     Stroke ([facility] )         03/26/18 2925   Note Type   Note type Eval only   Pain Assessment   Pain Assessment No/denies pain   Home Living   Type of Home Assisted living  [facility]   Home Layout Two level  (10 steps to access apt)   Home Equipment Walker   Prior Function   Level of [place] Needs assistance with ADLs and functional mobility  (ambulates mod (I) c RW)   Receives Help From Personal care attendant  (prn)   ADL Assistance Needs assistance   IADLs Needs assistance   Falls in the last 6 months 1 to 4  (per dtr)   Restrictions/Precautions   Other Precautions Droplet precautions;Multiple lines; Fall Risk;Cognitive; Chair Alarm; Bed Alarm   General   Additional Pertinent History pt presents to [facility] c back pain  dx: RSV + new onset a-flutter during hospital course  currently on droplet precautions  PT consulted for mobility + d/c planning  Family/Caregiver Present Yes  (dtr)   Cognition   Orientation Level Oriented to person   RUE Assessment   RUE Assessment WFL  (4-/5)   LUE Assessment   LUE Assessment WFL  (4-/5)   RLE Assessment   RLE Assessment WFL  (4-/5)   LLE Assessment   LLE Assessment WFL  (4-/5)   Coordination   Sensation WFL   Bed Mobility   Supine to Sit Unable to assess  (pt OOB in chair)   Transfers   Sit to Stand 4  Minimal assistance   Additional items Assist x 1; Armrests; Verbal cues; Increased time required   Stand to Sit 4  Minimal assistance   Additional items Assist x 1; Armrests; Verbal cues; Increased time required   Ambulation/Elevation   Gait pattern Narrow JENNYFER; Forward Flexion;Decreased foot clearance; Excessively slow   Gait Assistance 4  Minimal assist   Additional items Assist x 1;Verbal cues   Assistive Device Rolling walker   Distance 40'; limited by fatigue   Balance   Static Sitting Fair +   Dynamic Sitting Fair +   Static Standing Fair -   Dynamic Standing Poor +   Ambulatory Poor +   Endurance Deficit   Endurance Deficit Yes   Endurance Deficit Description fatigue, SOB   Activity Tolerance   Activity Tolerance Patient limited by fatigue   Nurse Made Aware Sheeba/Monique   Assessment   Prognosis Good   Problem List Decreased strength;Decreased endurance; Impaired balance;Decreased mobility; Decreased cognition;Decreased safety awareness; Impaired judgement   Assessment pt is a 71y/o f who presents to [facility] c back pain  dx: RSV + new onset a-flutter  currently on droplet precautions  PMH significant for dementia, COPD, CHF, a-flutter, + seizures  at baseline, pt mod (I) c functional mobility c RW + able to negotiate stairs  resides at [facility]  currently requires min (A)x1 for functional mobility tasks 2* deficits in strength, balance, gait quality, cognition, + activity tolerance noted in PT exam above  Barthel Index 45/100  ambulated 40' c RW limited by fatigue c pt stating, "I better sit down now"  min verbal cues required to remain safe distance c in JENNYFER of RW  returned to chair c chair alarm activated at end of session  would benefit from skilled PT to maximize functional mobility + improve quality of life  upon d/c, recommend STR vs return to [facility] c PT pending [facility]'s ability to care for pt at current level of functional mobility  PT eval of high complexity 2* unstable med status c pt requiring ongoing medical management 2* RSV  remains on droplet precautions c multiple lines   presents c multiple co-morbidities impacting PT including new onset a-flutter + dementia  presents c mobility deficits above requiring min (A)x1 for mobility tasks  will need to negotiate full flight of stairs upon return to [facility]  Barriers to Discharge Inaccessible home environment   Goals   Patient Goals "to get better"  STG Expiration Date 04/05/18   Short Term Goal #1 1  increase strength 1/2 grade to improve overall functional mobility, 2  perform transfers mod (I) to safely perform ADLs, 3  ambulate 250' mod (I) c RW to safely navigate [facility], 4  negotiate 12 stairs mod (I) to safely access apt at [facility]   Plan   Treatment/Interventions Functional transfer training;LE strengthening/ROM; Elevations; Therapeutic exercise; Endurance training;Patient/family training;Bed mobility;Gait training;Spoke to nursing;Family;Spoke to case management   PT Frequency 5x/wk   Recommendation   Recommendation Short-term skilled PT; Home PT  (STR if pt unable to return to [facility])   PT - OK to Discharge Yes  (to STR or [facility] c PT)   Barthel Index   Feeding 10   Bathing 0   Grooming Score 5   Dressing Score 5   Bladder Score 0   Bowels Score 10   Toilet Use Score 5   Transfers (Bed/Chair) Score 10   Mobility (Level Surface) Score 0   Stairs Score 0   Barthel Index Score 45     All Segovia, PT

## 2018-03-26 NOTE — PROGRESS NOTES
[facility] Internal Medicine Progress Note  Patient: Michael Lilly 71 y o  female   MRN: [id number]  PCP: Destini Clifford DO  Unit/Bed#: [facility] Encounter: [id number]  Date Of Visit: 03/26/18    Assessment:    Principal Problem:    Acute on chronic systolic CHF (congestive heart failure) ([facility] )  Active Problems:    HTN (hypertension)    COPD (chronic obstructive pulmonary disease) ([facility] )    New onset atrial flutter (HCC)    Back pain    Dementia    HLD (hyperlipidemia)    UTI (urinary tract infection)    Seizure disorder (HCC)    RSV infection      Plan:  Headache this morning-h/o aneurysm clipping; PTT high-ct head ordered and negative    Acute on chronic CHF:  improved cxr without chf but follow up cxr pending; not on outpatient diuretic cardiology following  New atrial flutter: Cardizem 60 mg p o  q 6 hours, beta-blocker increased as rate increased today; on heparin gtt willl need PO anticoagulation-start coumadin; tsh normal;    Echo 12/17 LEFT VENTRICLE:  Systolic function was normal  Ejection fraction was estimated in the range of 55 % to 65 %  There were no regional wall motion abnormalities  There was mild concentric hypertrophy  Doppler parameters were consistent with abnormal left ventricular relaxation (grade 1 diastolic dysfunction)      MITRAL VALVE:  There was mild annular calcification  There was mild regurgitation      AORTIC VALVE:  There was mild regurgitation      TRICUSPID VALVE:  There was mild regurgitation      PULMONIC VALVE:  There was mild regurgitation  Hypertension:  Lopressor increased to 50 mg b i d  p o            IRSV positive:  Maintain droplet precautions     UC with  enterococcus and stenotrophomonas-ID consult appreciated off rocephin          VTE Pharmacologic Prophylaxis:   Pharmacologic: Heparin Drip  Mechanical VTE Prophylaxis in Place: Yes    Patient Centered Rounds: I have performed bedside rounds with nursing staff today      Discussions with Specialists or Other Care Team Provider:     Education and Discussions with Family / Patient:     Time Spent for Care: 30 minutes  More than 50% of total time spent on counseling and coordination of care as described above  Current Length of Stay: 4 day(s)    Current Patient Status: Inpatient   Certification Statement: The patient will continue to require additional inpatient hospital stay due to chf    Discharge Plan: PT/OT consults    Code Status: Level 2 - DNAR: but accepts endotracheal intubation      Subjective:   C/o bad headache this morning  Notified by nursing PTT very high    Objective:     Vitals:   Temp (24hrs), Av 3 °F (36 8 °C), Min:98 °F (36 7 °C), Max:98 5 °F (36 9 °C)    HR:  [105-128] 115  Resp:  [20-32] 20  BP: ()/(70-84) 139/79  SpO2:  [90 %-97 %] 90 %  Body mass index is 36 77 kg/m²  Input and Output Summary (last 24 hours): Intake/Output Summary (Last 24 hours) at 18 0854  Last data filed at 18 0601   Gross per 24 hour   Intake                0 ml   Output              625 ml   Net             -625 ml       Physical Exam:     Physical Exam   Constitutional: She appears well-developed and well-nourished  HENT:   Head: Normocephalic and atraumatic  Eyes: Pupils are equal, round, and reactive to light  Cardiovascular:   irreg irreg rates 100-110's on telemetry   Pulmonary/Chest: Effort normal and breath sounds normal    Abdominal: Soft  Musculoskeletal: She exhibits no edema         Additional Data:     Labs:      Results from last 7 days  Lab Units 18  0559   WBC Thousand/uL 5 59   HEMOGLOBIN g/dL 11 5   HEMATOCRIT % 35 0   PLATELETS Thousands/uL 161   NEUTROS PCT % 50   LYMPHS PCT % 29   MONOS PCT % 14*   EOS PCT % 5       Results from last 7 days  Lab Units 18  0559  18  1809   SODIUM mmol/L 139  < > 145   POTASSIUM mmol/L 4 4  < > 3 9   CHLORIDE mmol/L 102  < > 108   CO2 mmol/L 29  < > 28   BUN mg/dL 27*  < > 26*   CREATININE mg/dL 1 30  < > 1 49*   CALCIUM mg/dL 9 1  < > 9 2   TOTAL PROTEIN g/dL  --   --  7 1   BILIRUBIN TOTAL mg/dL  --   --  0 30   ALK PHOS U/L  --   --  52   ALT U/L  --   --  26   AST U/L  --   --  17   GLUCOSE RANDOM mg/dL 117  < > 110   < > = values in this interval not displayed  Results from last 7 days  Lab Units 03/22/18  1710   INR  1 19*       * I Have Reviewed All Lab Data Listed Above  * Additional Pertinent Lab Tests Reviewed: All Labs Within Last 24 Hours Reviewed    Imaging:    Imaging Reports Reviewed Today Include:   Imaging Personally Reviewed by Myself Includes:      Recent Cultures (last 7 days):       Results from last 7 days  Lab Units 03/22/18  2256 03/22/18  1925   URINE CULTURE   --  >100,000 cfu/ml Enterococcus faecalis*  40,000-49,000 cfu/ml Stenotrophomonas maltophilia*   INFLUENZA A PCR  None Detected  --    INFLUENZA B PCR  None Detected  --    RSV PCR  Detected*  --        Last 24 Hours Medication List:        Today, Patient Was Seen By: Jose Bautista MD    ** Please Note: Dragon 360 Dictation voice to text software may have been used in the creation of this document   **

## 2018-03-26 NOTE — SOCIAL WORK
Cm met with patient daughter at patient bedside, accompanied by physical therapy  Daughter reports patient has been a resident of [address] [place] [address] for 4 years and will return when medically stable  Daughter reports if patient is recommended str, she would prefer her mother to discharge to [facility] (patient has been to [facility] in the past)  Patient daughter receptive to str referral being sent just in case [facility] is unable to accept her back  Cm contacted [facility] and spoke with [facility] regarding a bedside assessment  Cm informed Greta (DON) was not in today however, message will be shared with her for 03/27/18

## 2018-03-27 PROBLEM — J96.01 ACUTE RESPIRATORY FAILURE WITH HYPOXIA (HCC): Status: RESOLVED | Noted: 2018-03-22 | Resolved: 2018-03-23

## 2018-03-27 PROBLEM — I50.43 ACUTE ON CHRONIC COMBINED SYSTOLIC AND DIASTOLIC CHF (CONGESTIVE HEART FAILURE) (HCC): Status: ACTIVE | Noted: 2018-03-22

## 2018-03-27 PROBLEM — J96.01 ACUTE RESPIRATORY FAILURE WITH HYPOXIA (HCC): Status: ACTIVE | Noted: 2018-03-22

## 2018-03-27 LAB
ALBUMIN SERPL BCP-MCNC: 2.8 G/DL (ref 3.5–5)
ALP SERPL-CCNC: 47 U/L (ref 46–116)
ALT SERPL W P-5'-P-CCNC: 22 U/L (ref 12–78)
ANION GAP SERPL CALCULATED.3IONS-SCNC: 7 MMOL/L (ref 4–13)
APTT PPP: 35 SECONDS (ref 23–35)
APTT PPP: 80 SECONDS (ref 23–35)
AST SERPL W P-5'-P-CCNC: 15 U/L (ref 5–45)
BASOPHILS # BLD AUTO: 0.05 THOUSANDS/ΜL (ref 0–0.1)
BASOPHILS NFR BLD AUTO: 1 % (ref 0–1)
BILIRUB SERPL-MCNC: 0.3 MG/DL (ref 0.2–1)
BUN SERPL-MCNC: 29 MG/DL (ref 5–25)
CALCIUM SERPL-MCNC: 9.1 MG/DL (ref 8.3–10.1)
CHLORIDE SERPL-SCNC: 102 MMOL/L (ref 100–108)
CO2 SERPL-SCNC: 31 MMOL/L (ref 21–32)
CREAT SERPL-MCNC: 1.36 MG/DL (ref 0.6–1.3)
EOSINOPHIL # BLD AUTO: 0.23 THOUSAND/ΜL (ref 0–0.61)
EOSINOPHIL NFR BLD AUTO: 5 % (ref 0–6)
ERYTHROCYTE [DISTWIDTH] IN BLOOD BY AUTOMATED COUNT: 12.6 % (ref 11.6–15.1)
GFR SERPL CREATININE-BSD FRML MDRD: 39 ML/MIN/1.73SQ M
GLUCOSE SERPL-MCNC: 114 MG/DL (ref 65–140)
HCT VFR BLD AUTO: 35.3 % (ref 34.8–46.1)
HGB BLD-MCNC: 11.5 G/DL (ref 11.5–15.4)
INR PPP: 1.13 (ref 0.86–1.16)
LYMPHOCYTES # BLD AUTO: 1.94 THOUSANDS/ΜL (ref 0.6–4.47)
LYMPHOCYTES NFR BLD AUTO: 41 % (ref 14–44)
MAGNESIUM SERPL-MCNC: 2.1 MG/DL (ref 1.6–2.6)
MCH RBC QN AUTO: 32.9 PG (ref 26.8–34.3)
MCHC RBC AUTO-ENTMCNC: 32.6 G/DL (ref 31.4–37.4)
MCV RBC AUTO: 101 FL (ref 82–98)
MONOCYTES # BLD AUTO: 0.58 THOUSAND/ΜL (ref 0.17–1.22)
MONOCYTES NFR BLD AUTO: 12 % (ref 4–12)
NEUTROPHILS # BLD AUTO: 1.78 THOUSANDS/ΜL (ref 1.85–7.62)
NEUTS SEG NFR BLD AUTO: 38 % (ref 43–75)
NRBC BLD AUTO-RTO: 0 /100 WBCS
PHOSPHATE SERPL-MCNC: 4.1 MG/DL (ref 2.3–4.1)
PLATELET # BLD AUTO: 169 THOUSANDS/UL (ref 149–390)
PMV BLD AUTO: 12.4 FL (ref 8.9–12.7)
POTASSIUM SERPL-SCNC: 4.3 MMOL/L (ref 3.5–5.3)
PROT SERPL-MCNC: 6.8 G/DL (ref 6.4–8.2)
PROTHROMBIN TIME: 14.7 SECONDS (ref 12.1–14.4)
RBC # BLD AUTO: 3.5 MILLION/UL (ref 3.81–5.12)
SODIUM SERPL-SCNC: 140 MMOL/L (ref 136–145)
WBC # BLD AUTO: 4.72 THOUSAND/UL (ref 4.31–10.16)

## 2018-03-27 PROCEDURE — 80053 COMPREHEN METABOLIC PANEL: CPT | Performed by: GENERAL PRACTICE

## 2018-03-27 PROCEDURE — 99232 SBSQ HOSP IP/OBS MODERATE 35: CPT | Performed by: INTERNAL MEDICINE

## 2018-03-27 PROCEDURE — 85730 THROMBOPLASTIN TIME PARTIAL: CPT | Performed by: INTERNAL MEDICINE

## 2018-03-27 PROCEDURE — 85025 COMPLETE CBC W/AUTO DIFF WBC: CPT | Performed by: NURSE PRACTITIONER

## 2018-03-27 PROCEDURE — 85730 THROMBOPLASTIN TIME PARTIAL: CPT | Performed by: GENERAL PRACTICE

## 2018-03-27 PROCEDURE — 85610 PROTHROMBIN TIME: CPT | Performed by: INTERNAL MEDICINE

## 2018-03-27 PROCEDURE — 84100 ASSAY OF PHOSPHORUS: CPT | Performed by: NURSE PRACTITIONER

## 2018-03-27 PROCEDURE — 83735 ASSAY OF MAGNESIUM: CPT | Performed by: NURSE PRACTITIONER

## 2018-03-27 RX ORDER — WARFARIN SODIUM 5 MG/1
10 TABLET ORAL
Status: COMPLETED | OUTPATIENT
Start: 2018-03-27 | End: 2018-03-27

## 2018-03-27 RX ADMIN — BUDESONIDE AND FORMOTEROL FUMARATE DIHYDRATE 2 PUFF: 160; 4.5 AEROSOL RESPIRATORY (INHALATION) at 08:38

## 2018-03-27 RX ADMIN — BUDESONIDE AND FORMOTEROL FUMARATE DIHYDRATE 2 PUFF: 160; 4.5 AEROSOL RESPIRATORY (INHALATION) at 19:00

## 2018-03-27 RX ADMIN — METOPROLOL TARTRATE 50 MG: 50 TABLET ORAL at 08:37

## 2018-03-27 RX ADMIN — CARBAMAZEPINE 100 MG: 100 TABLET, CHEWABLE ORAL at 16:27

## 2018-03-27 RX ADMIN — DOCUSATE SODIUM 100 MG: 100 CAPSULE, LIQUID FILLED ORAL at 08:37

## 2018-03-27 RX ADMIN — Medication 325 MG: at 08:37

## 2018-03-27 RX ADMIN — DILTIAZEM HYDROCHLORIDE 60 MG: 60 TABLET, FILM COATED ORAL at 01:12

## 2018-03-27 RX ADMIN — DILTIAZEM HYDROCHLORIDE 60 MG: 60 TABLET, FILM COATED ORAL at 18:59

## 2018-03-27 RX ADMIN — METOPROLOL TARTRATE 50 MG: 50 TABLET ORAL at 22:30

## 2018-03-27 RX ADMIN — DILTIAZEM HYDROCHLORIDE 60 MG: 60 TABLET, FILM COATED ORAL at 12:36

## 2018-03-27 RX ADMIN — PRAVASTATIN SODIUM 40 MG: 40 TABLET ORAL at 16:26

## 2018-03-27 RX ADMIN — MELATONIN TAB 3 MG 6 MG: 3 TAB at 22:30

## 2018-03-27 RX ADMIN — HEPARIN SODIUM AND DEXTROSE 21.77 UNITS/KG/HR: 10000; 5 INJECTION INTRAVENOUS at 08:36

## 2018-03-27 RX ADMIN — DOCUSATE SODIUM 100 MG: 100 CAPSULE, LIQUID FILLED ORAL at 18:59

## 2018-03-27 RX ADMIN — ASPIRIN 81 MG: 81 TABLET, COATED ORAL at 08:38

## 2018-03-27 RX ADMIN — SENNOSIDES 8.6 MG: 8.6 TABLET, FILM COATED ORAL at 22:30

## 2018-03-27 RX ADMIN — DILTIAZEM HYDROCHLORIDE 60 MG: 60 TABLET, FILM COATED ORAL at 06:43

## 2018-03-27 RX ADMIN — FUROSEMIDE 20 MG: 10 INJECTION, SOLUTION INTRAMUSCULAR; INTRAVENOUS at 08:38

## 2018-03-27 RX ADMIN — DIVALPROEX SODIUM 500 MG: 500 TABLET, EXTENDED RELEASE ORAL at 08:37

## 2018-03-27 RX ADMIN — Medication 325 MG: at 18:59

## 2018-03-27 RX ADMIN — CARBAMAZEPINE 100 MG: 100 TABLET, CHEWABLE ORAL at 22:30

## 2018-03-27 RX ADMIN — HEPARIN SODIUM 4000 UNITS: 1000 INJECTION, SOLUTION INTRAVENOUS; SUBCUTANEOUS at 04:51

## 2018-03-27 RX ADMIN — WARFARIN SODIUM 10 MG: 5 TABLET ORAL at 19:00

## 2018-03-27 RX ADMIN — BUPROPION HYDROCHLORIDE 150 MG: 150 TABLET, FILM COATED, EXTENDED RELEASE ORAL at 08:38

## 2018-03-27 RX ADMIN — CARBAMAZEPINE 100 MG: 100 TABLET, CHEWABLE ORAL at 08:37

## 2018-03-27 RX ADMIN — FUROSEMIDE 20 MG: 10 INJECTION, SOLUTION INTRAMUSCULAR; INTRAVENOUS at 16:27

## 2018-03-27 NOTE — PROGRESS NOTES
Progress Note - Cardiology   Tiesha Dent 71 y o  female MRN: [id number]  Unit/Bed#: -01 Encounter: [id number]    Assessment:  1  Atrial flutter  Rate better    Plan:  Agree with increased dose of metoprolol    Subjective/Objective   Chief Complaint: None    Subjective: No chest pain    Objective: No distress    Vitals: /70 (BP Location: Right arm)   Pulse (!) 110   Temp 98 2 °F (36 8 °C) (Oral)   Resp 20   Ht 5' (1 524 m)   Wt 85 4 kg (188 lb 4 4 oz)   SpO2 90%   BMI 36 77 kg/m²   Vitals:    03/25/18 0500 03/25/18 0600   Weight: 85 4 kg (188 lb 4 4 oz) 85 4 kg (188 lb 4 4 oz)     Orthostatic Blood Pressures    Flowsheet Row Most Recent Value   Blood Pressure  118/70 filed at 03/26/2018 1700   Patient Position - Orthostatic VS  Lying filed at 03/26/2018 1700            Intake/Output Summary (Last 24 hours) at 03/26/18 2140  Last data filed at 03/26/18 1701   Gross per 24 hour   Intake                0 ml   Output              675 ml   Net             -675 ml       Invasive Devices     Peripheral Intravenous Line            Peripheral IV 03/26/18 Left Forearm less than 1 day          Drain            Urethral Catheter Latex 16 Fr  106 days                Review of Systems: Cardiovascular ROS: no chest pain or dyspnea on exertion    Physical Exam:   Head: Normocephalic and atraumatic  Cardiovascular: S1 normal and S2 normal   An irregularly irregular rhythm present  Exam reveals no gallop and no friction rub  No murmur heard  Pulmonary/Chest: Effort normal and breath sounds normal    Neurological: She is alert and oriented to person, place, and time  Lab Results:   I have personally reviewed pertinent lab results      CBC with diff:   Results from last 7 days  Lab Units 03/26/18  0559   WBC Thousand/uL 5 59   RBC Million/uL 3 46*   HEMOGLOBIN g/dL 11 5   HEMATOCRIT % 35 0   MCV fL 101*   MCH pg 33 2   MCHC g/dL 32 9   RDW % 12 5   MPV fL 12 6   PLATELETS Thousands/uL 161     CMP: Results from last 7 days  Lab Units 03/26/18  0559  03/22/18  1809   SODIUM mmol/L 139  < > 145   POTASSIUM mmol/L 4 4  < > 3 9   CHLORIDE mmol/L 102  < > 108   CO2 mmol/L 29  < > 28   ANION GAP mmol/L 8  < > 9   BUN mg/dL 27*  < > 26*   CREATININE mg/dL 1 30  < > 1 49*   GLUCOSE RANDOM mg/dL 117  < > 110   CALCIUM mg/dL 9 1  < > 9 2   AST U/L  --   --  17   ALT U/L  --   --  26   ALK PHOS U/L  --   --  52   TOTAL PROTEIN g/dL  --   --  7 1   BILIRUBIN TOTAL mg/dL  --   --  0 30   EGFR ml/min/1 73sq m 41  < > 35   < > = values in this interval not displayed  Troponin:   0  Lab Value Date/Time   TROPONINI <0 02 03/22/2018 2023   TROPONINI <0 02 03/22/2018 1710   TROPONINI 0 11 (H) 12/11/2017 0320   TROPONINI 0 11 (H) 12/11/2017 0104   TROPONINI 0 08 (H) 12/10/2017 2123   TROPONINI <0 02 11/21/2017 1748   TROPONINI <0 02 11/21/2017 1605     BNP:   Results from last 7 days  Lab Units 03/26/18  0559   SODIUM mmol/L 139   POTASSIUM mmol/L 4 4   CHLORIDE mmol/L 102   CO2 mmol/L 29   ANION GAP mmol/L 8   BUN mg/dL 27*   CREATININE mg/dL 1 30   GLUCOSE RANDOM mg/dL 117   CALCIUM mg/dL 9 1   EGFR ml/min/1 73sq m 41     Coags:   Results from last 7 days  Lab Units 03/26/18  1834  03/22/18  1710   PTT seconds 80*  < > 30   INR   --   --  1 19*   < > = values in this interval not displayed  TSH:   Results from last 7 days  Lab Units 03/22/18  1809   TSH 3RD GENERATON uIU/mL 1 422     Magnesium:   Results from last 7 days  Lab Units 03/26/18  0559   MAGNESIUM mg/dL 2 1     Lipid Profile:     Imaging: I have personally reviewed pertinent reports  EKG:   VTE Pharmacologic Prophylaxis:   VTE Mechanical Prophylaxis:     Counseling / Coordination of Care  Total time spent today 30 minutes  Greater than 50% of total time was spent with the patient and / or family counseling and / or coordination of care   A description of the counseling / coordination of care: 30

## 2018-03-27 NOTE — SOCIAL WORK
Conversation  w Angelic Perkins at [place] regarding d c back to [place]  Pt will have to to be able to do steps   Pt not medically cleared yet and steps are a PT goal  Agreement to discuss plan closer to dc to determine progress

## 2018-03-27 NOTE — PROGRESS NOTES
Progress Note - Infectious Disease   Demario Sickle 71 y o  female MRN: [id number]  Unit/Bed#: -01 Encounter: [id number]      Impression/Plan:  1  Acute hypoxic respiratory failure-no clinical or radiographic evidence of pneumonia  Perhaps the patient's RSV infection led to atrial fibrillation with rapid response which ultimately led to acute CHF and respiratory failure  Patient is clinically improved  -no antibiotics for now  -diuresis and rate control  -symptomatic treatment of the RSV although the patient is not very symptomatic at this time  -no additional ID workup for now     2  Positive urine culture-in a patient with chronic indwelling Davis catheter in place  No clinical evidence of an invasive infectious process at this time  As expected, the patient has bacteriuria in the setting of a chronic indwelling Davis catheter   -monitor off antibiotics  -no additional ID workup for now     3  Acute CHF-clinically improved with diuresis and rate control   -cardiology follow-up  -volume management  -rate control the CHF     4  COPD-no current evidence of an acute exacerbation    No active infectious disease issues  We will see the patient as needed  Please call if questions  Antibiotics:  None    Subjective:  Patient has no fever, chills, sweats; no nausea, vomiting, diarrhea; no cough, or increased shortness of breath; no pain  No dysuria  No new symptoms  Feeling better overall    Objective:  Vitals:  HR:  [] 88  Resp:  [18] 18  BP: (118-167)/(68-74) 140/70  SpO2:  [92 %] 92 %  Temp (24hrs), Av 2 °F (36 8 °C), Min:98 2 °F (36 8 °C), Max:98 2 °F (36 8 °C)  Current: Temperature: 98 2 °F (36 8 °C)    Physical Exam:   General Appearance:  Alert, interactive, nontoxic, no acute distress  Throat: Oropharynx moist without lesions      Lungs:   Decreased breath sounds bilaterally; no wheezes, rhonchi or rales; respirations unlabored   Heart:  Irregular; no murmur, rub or gallop Abdomen:   Soft, non-tender, non-distended, positive bowel sounds  Extremities: No clubbing, cyanosis or edema   Skin: No new rashes or lesions  No draining wounds noted  Labs, Imaging, & Other studies:   All pertinent labs and imaging studies were personally reviewed    Results from last 7 days  Lab Units 03/27/18  0359 03/26/18  0559 03/25/18  0620   WBC Thousand/uL 4 72 5 59 7 37   HEMOGLOBIN g/dL 11 5 11 5 12 5   PLATELETS Thousands/uL 169 161 160       Results from last 7 days  Lab Units 03/27/18  0355 03/26/18  0559 03/25/18  0621  03/22/18  1809   SODIUM mmol/L 140 139 139  < > 145   POTASSIUM mmol/L 4 3 4 4 4 4  < > 3 9   CHLORIDE mmol/L 102 102 101  < > 108   CO2 mmol/L 31 29 28  < > 28   ANION GAP mmol/L 7 8 10  < > 9   BUN mg/dL 29* 27* 29*  < > 26*   CREATININE mg/dL 1 36* 1 30 1 49*  < > 1 49*   EGFR ml/min/1 73sq m 39 41 35  < > 35   GLUCOSE RANDOM mg/dL 114 117 138  < > 110   CALCIUM mg/dL 9 1 9 1 9 5  < > 9 2   AST U/L 15  --   --   --  17   ALT U/L 22  --   --   --  26   ALK PHOS U/L 47  --   --   --  52   TOTAL PROTEIN g/dL 6 8  --   --   --  7 1   BILIRUBIN TOTAL mg/dL 0 30  --   --   --  0 30   < > = values in this interval not displayed  Results from last 7 days  Lab Units 03/22/18 2256 03/22/18  1925   URINE CULTURE   --  >100,000 cfu/ml Enterococcus faecalis*  40,000-49,000 cfu/ml Stenotrophomonas maltophilia*   INFLUENZA A PCR  None Detected  --    INFLUENZA B PCR  None Detected  --    RSV PCR  Detected*  --      Chest x-ray-Stable chest with cardiomegaly and increased bibasilar lung markings possibly representing interstitial edema due to congestive heart failure   These changes may also be chronic

## 2018-03-27 NOTE — PROGRESS NOTES
[facility] Internal Medicine Progress Note  Patient: Jameson Carroll 71 y o  female   MRN: [id number]  PCP: Cullen Jj DO  Unit/Bed#: -01 Encounter: [id number]  Date Of Visit: 03/27/18    Assessment/Plan:    Principal Problem:    Acute respiratory failure with hypoxia ([address] )  Active Problems:    HTN (hypertension)    COPD (chronic obstructive pulmonary disease) ([address] )    New onset atrial flutter (HCC)    Back pain    Dementia    HLD (hyperlipidemia)    UTI (urinary tract infection)    Seizure disorder (HCC)    RSV infection    Present on Admission:   Acute respiratory failure with hypoxia ([address] )   New onset atrial flutter ([address] )   COPD (chronic obstructive pulmonary disease) ([address] )   HTN (hypertension)   Back pain   Dementia   HLD (hyperlipidemia)   UTI (urinary tract infection)   Seizure disorder ([address] )   RSV infection      · Acute hypoxic respiratory failure-secondary to RSV infection which also likely triggered atrial fibrillation  Clinically doing better  Continue with current treatment  · UTI-present on admission  Infectious Disease following  · Dementia  Continue with current treatment and monitoring  · New onset atrial fibrillation/flutter  Rate and rhythm is currently controlled  Continue with current treatment including anticoagulation  Patient is on IV heparin  She is also on Coumadin  Check INR  Goal INR 2-3  · Hyperlipidemia  Continue with  · Chronic back  Currently stable  Courage to on a more activity        VTE Pharmacologic Prophylaxis:   Pharmacologic: Heparin Drip  Mechanical VTE Prophylaxis in Place: Yes    Patient Centered Rounds: I have performed bedside rounds with nursing staff today      Discussions with Specialists or Other Care Team Provider:  Yes    Education and Discussions with Family / Patient:  Yes      Current Length of Stay: 5 day(s)    Current Patient Status: Inpatient   Certification Statement: The patient will continue to require additional inpatient hospital stay due to IV heparin/Coumadin    Discharge Plan:  Short-term rehab at least    Code Status: Level 2 - DNAR: but accepts endotracheal intubation      Subjective:   Patient feels better in general   She denies any chest pain  She denies any significant shortness of breath while at rest   She has underlying dementia and is a poor historian  She does not remember why she is here  She denies any fever or chills  Denies any hematemesis, melena    Objective:     Vitals:   Temp (24hrs), Av 2 °F (36 8 °C), Min:98 2 °F (36 8 °C), Max:98 2 °F (36 8 °C)    HR:  [] 88  Resp:  [18] 18  BP: (118-167)/(68-74) 140/70  SpO2:  [92 %] 92 %  Body mass index is 36 77 kg/m²  Input and Output Summary (last 24 hours): Intake/Output Summary (Last 24 hours) at 18 1354  Last data filed at 18 0455   Gross per 24 hour   Intake                0 ml   Output              625 ml   Net             -625 ml           Physical Exam:     Overweight female who is up in the chair  Not in any significant distress, however, she is on oxygen  Vital signs reviewed as above  S1 and S2 are audible  She has irregularly irregular heart rhythm  Abdomen is obese  Nontender  Bowel sounds are audible  Chest:  She has decreased breath sounds at the bases with coarse crackles on auscultation as well  Awake and alert    She knows that she is in the hospital   Otherwise has underlying dementia  Pleasant to talk    Additional Data:     Labs:      Results from last 7 days  Lab Units 18  0359   WBC Thousand/uL 4 72   HEMOGLOBIN g/dL 11 5   HEMATOCRIT % 35 3   PLATELETS Thousands/uL 169   NEUTROS PCT % 38*   LYMPHS PCT % 41   MONOS PCT % 12   EOS PCT % 5       Results from last 7 days  Lab Units 18  0355   SODIUM mmol/L 140   POTASSIUM mmol/L 4 3   CHLORIDE mmol/L 102   CO2 mmol/L 31   BUN mg/dL 29*   CREATININE mg/dL 1 36*   CALCIUM mg/dL 9 1   TOTAL PROTEIN g/dL 6 8   BILIRUBIN TOTAL mg/dL 0 30   ALK PHOS U/L 47   ALT U/L 22   AST U/L 15   GLUCOSE RANDOM mg/dL 114       Results from last 7 days  Lab Units 03/27/18  0355   INR  1 13       * I Have Reviewed All Lab Data Listed Above  * Additional Pertinent Lab Tests Reviewed:  All Labs Within Last 24 Hours Reviewed    Imaging:    Imaging Reports Reviewed Today Include: CXR:  3/26--results reviewed- no change      Recent Cultures (last 7 days):       Results from last 7 days  Lab Units 03/22/18  2256 03/22/18  1925   URINE CULTURE   --  >100,000 cfu/ml Enterococcus faecalis*  40,000-49,000 cfu/ml Stenotrophomonas maltophilia*   INFLUENZA A PCR  None Detected  --    INFLUENZA B PCR  None Detected  --    RSV PCR  Detected*  --        Last 24 Hours Medication List:     Current Facility-Administered Medications:  acetaminophen 650 mg Oral Q6H PRN Sudhir Baker, BETHNP    albuterol 2 5 mg Nebulization Q6H PRN Wendy Flake, PA-C    aspirin 81 mg Oral Daily Wendy Flake, PA-C    budesonide-formoterol 2 puff Inhalation BID Wendy Flake, PA-C    buPROPion 150 mg Oral Daily Wendy Flake, PA-C    butalbital-acetaminophen-caffeine 1 tablet Oral Q4H PRN Sudhir Baker, TANVI    carBAMazepine 100 mg Oral TID Wendy Flake, PA-C    diltiazem 60 mg Oral Q6H [address] Wendy Flake, PA-C    divalproex sodium 500 mg Oral Daily Wendy Flake, PA-C    docusate sodium 100 mg Oral BID Wendy Flake, PA-C    ferrous sulfate 325 mg Oral BID Wendy Flake, PA-C    furosemide 20 mg Intravenous BID (diuretic) Rafy Meléndez MD    heparin (porcine) 3-20 Units/kg/hr (Order-Specific) Intravenous Titrated Rennykelsey Anthony,  Last Rate: 21 765 Units/kg/hr (03/27/18 0836)   heparin (porcine) 2,000 Units Intravenous PRN Neel DARREL Zwiebel, DO    heparin (porcine) 4,000 Units Intravenous PRN Neel W Zwiebel, DO    melatonin 6 mg Oral HS Paramjit Lynch PA-C    metoprolol 5 mg Intravenous Q6H PRN Sudhir Baker, TANVI    metoprolol tartrate 50 mg Oral Q12H [address] Paramjit Lynch PA-C    pravastatin 40 mg Oral Daily With Chirag Mendez PA-C    senna 1 tablet Oral HS Lakeshia Flowers PA-C    warfarin 10 mg Oral Once (warfarin) Darya Hernández MD         Today, Patient Was Seen By: Darya Hernández MD    ** Please Note: Dragon 360 Dictation voice to text software may have been used in the creation of this document   **

## 2018-03-28 LAB
APTT PPP: >210 SECONDS (ref 23–35)
INR PPP: 1.85 (ref 0.86–1.16)
PROTHROMBIN TIME: 21.8 SECONDS (ref 12.1–14.4)

## 2018-03-28 PROCEDURE — 99232 SBSQ HOSP IP/OBS MODERATE 35: CPT | Performed by: INTERNAL MEDICINE

## 2018-03-28 PROCEDURE — 85610 PROTHROMBIN TIME: CPT | Performed by: INTERNAL MEDICINE

## 2018-03-28 PROCEDURE — 85730 THROMBOPLASTIN TIME PARTIAL: CPT | Performed by: INTERNAL MEDICINE

## 2018-03-28 RX ORDER — WARFARIN SODIUM 5 MG/1
5 TABLET ORAL
Status: COMPLETED | OUTPATIENT
Start: 2018-03-28 | End: 2018-03-28

## 2018-03-28 RX ORDER — ONDANSETRON 2 MG/ML
4 INJECTION INTRAMUSCULAR; INTRAVENOUS EVERY 6 HOURS PRN
Status: DISCONTINUED | OUTPATIENT
Start: 2018-03-28 | End: 2018-03-30 | Stop reason: HOSPADM

## 2018-03-28 RX ADMIN — MELATONIN TAB 3 MG 6 MG: 3 TAB at 22:43

## 2018-03-28 RX ADMIN — Medication 325 MG: at 17:10

## 2018-03-28 RX ADMIN — CARBAMAZEPINE 100 MG: 100 TABLET, CHEWABLE ORAL at 09:02

## 2018-03-28 RX ADMIN — DOCUSATE SODIUM 100 MG: 100 CAPSULE, LIQUID FILLED ORAL at 09:02

## 2018-03-28 RX ADMIN — DILTIAZEM HYDROCHLORIDE 60 MG: 60 TABLET, FILM COATED ORAL at 17:09

## 2018-03-28 RX ADMIN — METOPROLOL TARTRATE 50 MG: 50 TABLET ORAL at 21:50

## 2018-03-28 RX ADMIN — HEPARIN SODIUM AND DEXTROSE 21.8 UNITS/KG/HR: 10000; 5 INJECTION INTRAVENOUS at 08:11

## 2018-03-28 RX ADMIN — BUPROPION HYDROCHLORIDE 150 MG: 150 TABLET, FILM COATED, EXTENDED RELEASE ORAL at 09:02

## 2018-03-28 RX ADMIN — PRAVASTATIN SODIUM 40 MG: 40 TABLET ORAL at 16:21

## 2018-03-28 RX ADMIN — SENNOSIDES 8.6 MG: 8.6 TABLET, FILM COATED ORAL at 22:43

## 2018-03-28 RX ADMIN — CARBAMAZEPINE 100 MG: 100 TABLET, CHEWABLE ORAL at 16:21

## 2018-03-28 RX ADMIN — FUROSEMIDE 20 MG: 10 INJECTION, SOLUTION INTRAMUSCULAR; INTRAVENOUS at 09:01

## 2018-03-28 RX ADMIN — ASPIRIN 81 MG: 81 TABLET, COATED ORAL at 09:02

## 2018-03-28 RX ADMIN — Medication 325 MG: at 09:02

## 2018-03-28 RX ADMIN — FUROSEMIDE 20 MG: 10 INJECTION, SOLUTION INTRAMUSCULAR; INTRAVENOUS at 16:22

## 2018-03-28 RX ADMIN — CARBAMAZEPINE 100 MG: 100 TABLET, CHEWABLE ORAL at 21:50

## 2018-03-28 RX ADMIN — DILTIAZEM HYDROCHLORIDE 60 MG: 60 TABLET, FILM COATED ORAL at 11:37

## 2018-03-28 RX ADMIN — DIVALPROEX SODIUM 500 MG: 500 TABLET, EXTENDED RELEASE ORAL at 11:37

## 2018-03-28 RX ADMIN — DOCUSATE SODIUM 100 MG: 100 CAPSULE, LIQUID FILLED ORAL at 17:10

## 2018-03-28 RX ADMIN — HEPARIN SODIUM AND DEXTROSE 18.8 UNITS/KG/HR: 10000; 5 INJECTION INTRAVENOUS at 10:27

## 2018-03-28 RX ADMIN — BUDESONIDE AND FORMOTEROL FUMARATE DIHYDRATE 2 PUFF: 160; 4.5 AEROSOL RESPIRATORY (INHALATION) at 11:38

## 2018-03-28 RX ADMIN — WARFARIN SODIUM 5 MG: 5 TABLET ORAL at 17:10

## 2018-03-28 RX ADMIN — BUDESONIDE AND FORMOTEROL FUMARATE DIHYDRATE 2 PUFF: 160; 4.5 AEROSOL RESPIRATORY (INHALATION) at 17:11

## 2018-03-28 NOTE — PROGRESS NOTES
[facility] Internal Medicine Progress Note  Patient: Yue Painting 71 y o  female   MRN: [id number]  PCP: Wendy Shankar DO  Unit/Bed#: -01 Encounter: [id number]  Date Of Visit: 03/28/18    Assessment/Plan:    Principal Problem:    Acute respiratory failure with hypoxia ([address] )  Active Problems:    HTN (hypertension)    COPD (chronic obstructive pulmonary disease) ([address] )    New onset atrial flutter (HCC)    Back pain    Dementia    HLD (hyperlipidemia)    UTI (urinary tract infection)    Seizure disorder (HCC)    RSV infection    Present on Admission:   Acute respiratory failure with hypoxia ([address] )   New onset atrial flutter ([address] )   COPD (chronic obstructive pulmonary disease) ([address] )   HTN (hypertension)   Back pain   Dementia   HLD (hyperlipidemia)   UTI (urinary tract infection)   Seizure disorder ([address] )   RSV infection      · Acute hypoxic respiratory failure-multi factors including RSV infection  Making slow progress  Would ask for oxygen saturation studies  Patient is not on oxygen at home  · New atrial flutter-heart rate and rhythm are stable  Continue with current treatment including anticoagulation  INR goal is 2-3  · UTI-present on admission  Has completed treatment  · COPD with exacerbation  Currently stable and continue with treatment as above  · Dementia  No further recommendation  · History of seizure disorder  Continue with current treatment  · Essential hypertension  Continue with current treatment and adjust medications as needed      VTE Pharmacologic Prophylaxis:   Pharmacologic: Heparin Drip  Mechanical VTE Prophylaxis in Place: Yes    Patient Centered Rounds: I have performed bedside rounds with nursing staff today      Discussions with Specialists or Other Care Team Provider: y    Education and Discussions with Family / Patient: y        Current Length of Stay: 6 day(s)    Current Patient Status: Inpatient   Certification Statement: The patient will continue to require additional inpatient hospital stay due to IV heparin/Coumadin    Discharge Plan:  Back to her facility when stable    Code Status: Level 2 - DNAR: but accepts endotracheal intubation      Subjective:   Patient feels good today  I saw her this morning  Denies any chest pain  Denies any abdominal pain  Denies any fever or chills  Later on, she was complaining of being nauseous as she was waiting for her lunch as well  Objective:     Vitals:   Temp (24hrs), Av °F (36 7 °C), Min:97 7 °F (36 5 °C), Max:98 6 °F (37 °C)    HR:  [] 134  Resp:  [17-18] 17  BP: (100-140)/(57-88) 106/60  SpO2:  [94 %-97 %] 94 %  Body mass index is 43 44 kg/m²  Input and Output Summary (last 24 hours): Intake/Output Summary (Last 24 hours) at 18 1608  Last data filed at 18 1401   Gross per 24 hour   Intake           903 58 ml   Output             2300 ml   Net         -1396 42 ml           Physical Exam:     Vital signs are reviewed as above  Constitutional:  Patient up in her room  Not in any significant distress today  Eyes: EOM grossly intact  Conjunctivae are  pale  Patient has anicteric sclera  HENT: Oropharynx are moist  Did not notice any significant lesions on the tongue  Head normocephalic  Neck: Neck is supple  Cardiac: I did not hear any rubs or gallop  Patient has irregular rhythm   Respiratory: Patient not in significant respiratory distress  Has oxygen on  Air entry in general is improved  GI: Abdomen is obese and soft  It is grossly nontender  Bowel sounds are adequate  I was not able to appreciate any hepatosplenomegaly  Neurologic:  Patient is awake and alert  She is pleasantly confused  Skin: Skin is warm and dry  Her skin is also  Psychiatric: Mood and affect are pleasant and she is pleasantly confused  Musculoskeletal  Patient moving all extremities          Additional Data:     Labs:      Results from last 7 days  Lab Units 18  0359   WBC Thousand/uL 4 72 HEMOGLOBIN g/dL 11 5   HEMATOCRIT % 35 3   PLATELETS Thousands/uL 169   NEUTROS PCT % 38*   LYMPHS PCT % 41   MONOS PCT % 12   EOS PCT % 5       Results from last 7 days  Lab Units 03/27/18  0355   SODIUM mmol/L 140   POTASSIUM mmol/L 4 3   CHLORIDE mmol/L 102   CO2 mmol/L 31   BUN mg/dL 29*   CREATININE mg/dL 1 36*   CALCIUM mg/dL 9 1   TOTAL PROTEIN g/dL 6 8   BILIRUBIN TOTAL mg/dL 0 30   ALK PHOS U/L 47   ALT U/L 22   AST U/L 15   GLUCOSE RANDOM mg/dL 114       Results from last 7 days  Lab Units 03/28/18  0755   INR  1 85*       * I Have Reviewed All Lab Data Listed Above  * Additional Pertinent Lab Tests Reviewed:  All Labs Within Last 24 Hours Reviewed      Recent Cultures (last 7 days):       Results from last 7 days  Lab Units 03/22/18  2256 03/22/18  1925   URINE CULTURE   --  >100,000 cfu/ml Enterococcus faecalis*  40,000-49,000 cfu/ml Stenotrophomonas maltophilia*   INFLUENZA A PCR  None Detected  --    INFLUENZA B PCR  None Detected  --    RSV PCR  Detected*  --        Last 24 Hours Medication List:     Current Facility-Administered Medications:  acetaminophen 650 mg Oral Q6H PRN Altamese Jass, CRNP    albuterol 2 5 mg Nebulization Q6H PRN Raquel Boss PA-C    aspirin 81 mg Oral Daily MORENA Lawrence-CECILIA    budesonide-formoterol 2 puff Inhalation BID MORENA Lawrence-CECILIA    buPROPion 150 mg Oral Daily Raquel Boss PA-C    butalbital-acetaminophen-caffeine 1 tablet Oral Q4H PRN Altamese Jass, CRNP    carBAMazepine 100 mg Oral TID Raquel Boss PA-C    diltiazem 60 mg Oral Q6H [facility] Raquel Boss PA-C    divalproex sodium 500 mg Oral Daily Raquel Boss PA-C    docusate sodium 100 mg Oral BID Raquel Boss, PA-CECILIA    ferrous sulfate 325 mg Oral BID MORENA Lawrence-C    furosemide 20 mg Intravenous BID (diuretic) Escobar Meléndez MD    heparin (porcine) 3-20 Units/kg/hr (Order-Specific) Intravenous Titrated Tennie Grams DO Piedad Last Rate: 18 8 Units/kg/hr (03/28/18 1027)   heparin (porcine) 2,000 Units Intravenous PRN Franky Barrier Piedad,     heparin (porcine) 4,000 Units Intravenous PRN Neel Herbert, DO    melatonin 6 mg Oral HS Learzuleika Bearveto, PA-C    metoprolol 5 mg Intravenous Q6H PRN Lejewels Ward, CRNP    metoprolol tartrate 50 mg Oral Q12H [address] Leartis Beards, PA-C    ondansetron 4 mg Intravenous Q6H PRN Marissa Tom MD    pravastatin 40 mg Oral Daily With [employer], FELIZ    senna 1 tablet Oral HS Calvin Maria Elenabrayden, PA-C    warfarin 5 mg Oral Once (warfarin) Marissa Tom MD         Today, Patient Was Seen By: Marissa Tom MD    ** Please Note: Dragon 360 Dictation voice to text software may have been used in the creation of this document   **

## 2018-03-28 NOTE — PLAN OF CARE
CARDIOVASCULAR - ADULT     Maintains optimal cardiac output and hemodynamic stability Progressing     Absence of cardiac dysrhythmias or at baseline rhythm Progressing        DISCHARGE PLANNING - CARE MANAGEMENT     Discharge to post-acute care or home with appropriate resources Progressing        MUSCULOSKELETAL - ADULT     Maintain or return mobility to safest level of function Progressing     Maintain proper alignment of affected body part Progressing        Potential for Falls     Patient will remain free of falls Progressing        Prexisting or High Potential for Compromised Skin Integrity     Skin integrity is maintained or improved Progressing        RESPIRATORY - ADULT     Achieves optimal ventilation and oxygenation Progressing        SAFETY,RESTRAINT: NV/NON-SELF DESTRUCTIVE BEHAVIOR     Remains free of harm/injury (restraint for non violent/non self-detsructive behavior) Progressing     Returns to optimal restraint-free functioning Progressing        SKIN/TISSUE INTEGRITY - ADULT     Skin integrity remains intact Progressing

## 2018-03-29 PROBLEM — R26.2 AMBULATORY DYSFUNCTION: Status: ACTIVE | Noted: 2018-03-29

## 2018-03-29 LAB
ANION GAP SERPL CALCULATED.3IONS-SCNC: 8 MMOL/L (ref 4–13)
APTT PPP: >210 SECONDS (ref 23–35)
BUN SERPL-MCNC: 33 MG/DL (ref 5–25)
CALCIUM SERPL-MCNC: 9.5 MG/DL (ref 8.3–10.1)
CHLORIDE SERPL-SCNC: 103 MMOL/L (ref 100–108)
CO2 SERPL-SCNC: 30 MMOL/L (ref 21–32)
CREAT SERPL-MCNC: 1.48 MG/DL (ref 0.6–1.3)
GFR SERPL CREATININE-BSD FRML MDRD: 35 ML/MIN/1.73SQ M
GLUCOSE SERPL-MCNC: 115 MG/DL (ref 65–140)
INR PPP: 3.1 (ref 0.86–1.16)
POTASSIUM SERPL-SCNC: 4.2 MMOL/L (ref 3.5–5.3)
PROTHROMBIN TIME: 32.9 SECONDS (ref 12.1–14.4)
SODIUM SERPL-SCNC: 141 MMOL/L (ref 136–145)

## 2018-03-29 PROCEDURE — 80048 BASIC METABOLIC PNL TOTAL CA: CPT | Performed by: INTERNAL MEDICINE

## 2018-03-29 PROCEDURE — 94762 N-INVAS EAR/PLS OXIMTRY CONT: CPT

## 2018-03-29 PROCEDURE — 85730 THROMBOPLASTIN TIME PARTIAL: CPT | Performed by: INTERNAL MEDICINE

## 2018-03-29 PROCEDURE — 85610 PROTHROMBIN TIME: CPT | Performed by: INTERNAL MEDICINE

## 2018-03-29 PROCEDURE — 99232 SBSQ HOSP IP/OBS MODERATE 35: CPT | Performed by: NURSE PRACTITIONER

## 2018-03-29 RX ORDER — WARFARIN SODIUM 2 MG/1
2 TABLET ORAL
Status: COMPLETED | OUTPATIENT
Start: 2018-03-29 | End: 2018-03-29

## 2018-03-29 RX ORDER — FUROSEMIDE 40 MG/1
40 TABLET ORAL DAILY
Status: DISCONTINUED | OUTPATIENT
Start: 2018-03-30 | End: 2018-03-30 | Stop reason: HOSPADM

## 2018-03-29 RX ADMIN — CARBAMAZEPINE 100 MG: 100 TABLET, CHEWABLE ORAL at 16:02

## 2018-03-29 RX ADMIN — DILTIAZEM HYDROCHLORIDE 60 MG: 60 TABLET, FILM COATED ORAL at 12:14

## 2018-03-29 RX ADMIN — DIVALPROEX SODIUM 500 MG: 500 TABLET, EXTENDED RELEASE ORAL at 08:46

## 2018-03-29 RX ADMIN — DILTIAZEM HYDROCHLORIDE 60 MG: 60 TABLET, FILM COATED ORAL at 18:28

## 2018-03-29 RX ADMIN — BUPROPION HYDROCHLORIDE 150 MG: 150 TABLET, FILM COATED, EXTENDED RELEASE ORAL at 08:42

## 2018-03-29 RX ADMIN — ONDANSETRON 4 MG: 2 INJECTION INTRAMUSCULAR; INTRAVENOUS at 13:39

## 2018-03-29 RX ADMIN — Medication 325 MG: at 08:41

## 2018-03-29 RX ADMIN — BUDESONIDE AND FORMOTEROL FUMARATE DIHYDRATE 2 PUFF: 160; 4.5 AEROSOL RESPIRATORY (INHALATION) at 18:28

## 2018-03-29 RX ADMIN — BUDESONIDE AND FORMOTEROL FUMARATE DIHYDRATE 2 PUFF: 160; 4.5 AEROSOL RESPIRATORY (INHALATION) at 08:42

## 2018-03-29 RX ADMIN — CARBAMAZEPINE 100 MG: 100 TABLET, CHEWABLE ORAL at 08:42

## 2018-03-29 RX ADMIN — ASPIRIN 81 MG: 81 TABLET, COATED ORAL at 08:41

## 2018-03-29 RX ADMIN — CARBAMAZEPINE 100 MG: 100 TABLET, CHEWABLE ORAL at 22:00

## 2018-03-29 RX ADMIN — HEPARIN SODIUM AND DEXTROSE 12.8 UNITS/KG/HR: 10000; 5 INJECTION INTRAVENOUS at 04:28

## 2018-03-29 RX ADMIN — FUROSEMIDE 20 MG: 10 INJECTION, SOLUTION INTRAMUSCULAR; INTRAVENOUS at 16:02

## 2018-03-29 RX ADMIN — WARFARIN SODIUM 2 MG: 2 TABLET ORAL at 18:28

## 2018-03-29 RX ADMIN — DILTIAZEM HYDROCHLORIDE 60 MG: 60 TABLET, FILM COATED ORAL at 05:18

## 2018-03-29 RX ADMIN — Medication 325 MG: at 18:28

## 2018-03-29 RX ADMIN — DOCUSATE SODIUM 100 MG: 100 CAPSULE, LIQUID FILLED ORAL at 18:28

## 2018-03-29 RX ADMIN — SENNOSIDES 8.6 MG: 8.6 TABLET, FILM COATED ORAL at 22:00

## 2018-03-29 RX ADMIN — FUROSEMIDE 20 MG: 10 INJECTION, SOLUTION INTRAMUSCULAR; INTRAVENOUS at 08:42

## 2018-03-29 RX ADMIN — PRAVASTATIN SODIUM 40 MG: 40 TABLET ORAL at 16:02

## 2018-03-29 RX ADMIN — METOPROLOL TARTRATE 50 MG: 50 TABLET ORAL at 22:00

## 2018-03-29 RX ADMIN — DOCUSATE SODIUM 100 MG: 100 CAPSULE, LIQUID FILLED ORAL at 08:41

## 2018-03-29 RX ADMIN — MELATONIN TAB 3 MG 6 MG: 3 TAB at 22:00

## 2018-03-29 RX ADMIN — METOPROLOL TARTRATE 50 MG: 50 TABLET ORAL at 08:42

## 2018-03-29 NOTE — ASSESSMENT & PLAN NOTE
· Complicated by RSV infection and history of chronic obstructive pulmonary disease, chest x-ray without evidence of pneumonia  · Completed overnight pulse ox Spo2 to less than 89 about 3 hours and 50 minutes documented    Lowest oxygen saturation 72%  · Pulse oximetry noted to be 92% on room air when awake  · Recommend oxygen at HS 2 L maintain saturations greater than 88%

## 2018-03-29 NOTE — ASSESSMENT & PLAN NOTE
· Suspect possible mild acute exacerbation in the setting of AFib with RVR   · At this time does not appear to be in volume overload  · Will change IV Lasix to oral  · Monitor daily weights and intake and output

## 2018-03-29 NOTE — ASSESSMENT & PLAN NOTE
· Heart rate controlled continue Cardizem and metoprolol   · INR therapeutic, stop heparin, continue coumadin  · Goal INR between 2 and 3  · Check INR daily to determine optimal coumadin dose  · Continue 2 mg daily  · Discussed with daughter who will contact her established cardiologist to manage her INR and Coumadin dose after she is discharged from the rehab facility

## 2018-03-29 NOTE — ASSESSMENT & PLAN NOTE
· PT/OT  · Current recommendations are to nursing rehab  · Plan discharge to [facility] tomorrow time has been set up for 10:00 a m

## 2018-03-29 NOTE — ASSESSMENT & PLAN NOTE
· POA, finished abx course   · Per ID suspect that she may not had an infectious process referring to the bacteria given that she has a chronic indwelling Baca catheter  · She will be monitored off antibiotics  · Maintain baca (chronic), routine baca care

## 2018-03-29 NOTE — PLAN OF CARE
Problem: DISCHARGE PLANNING - CARE MANAGEMENT  Goal: Discharge to post-acute care or home with appropriate resources  INTERVENTIONS:  - Conduct assessment to determine patient/family and health care team treatment goals, and need for post-acute services based on payer coverage, community resources, and patient preferences, and barriers to discharge  - Address psychosocial, clinical, and financial barriers to discharge as identified in assessment in conjunction with the patient/family and health care team  - Arrange appropriate level of post-acute services according to patient's   needs and preference and payer coverage in collaboration with the physician and health care team  - Communicate with and update the patient/family, physician, and health care team regarding progress on the discharge plan  - Arrange appropriate transportation to post-acute venues    Outcome: Progressing  Per recent PT notes and pt's new need for oxygen, she does not appear to be at a level appropriate for return [facility]  Per previous conversation with Sunil Moscoso, family was agreeable to [address] at [facility]  VINOD s/w Zahra Olsen at [facility] along with their DON to discuss this patient plans for rehab and return to [place]  Zahra Olsen states she will have a bed open tomorrow for patient  BLS transport was set up for pt for tomorrow at 10am via [facility] to [facility]  I called pt's daughter Kieran Poole to make her aware  Awaiting call back  CM dept will follow

## 2018-03-29 NOTE — PROGRESS NOTES
Progress Note Joaquin Letters 1945, 71 y o  female MRN: [id number]    Unit/Bed#: -01 Encounter: [id number]    Primary Care Provider: Nathalie Fabian DO   Date and time admitted to hospital: 3/22/2018  4:28 PM        RSV infection   Assessment & Plan    · Cough improved but still requiring oxygen at HS  · Supportive care, encourage I/S        New onset atrial flutter ([address] )   Assessment & Plan    · Heart rate controlled continue Cardizem and metoprolol   · INR therapeutic, stop heparin, continue coumadin  · Goal INR between 2 and 3  · Check INR daily to determine optimal coumadin dose  · Continue 2 mg daily  · Discussed with daughter who will contact her established cardiologist to manage her INR and Coumadin dose after she is discharged from the rehab facility        Ambulatory dysfunction   Assessment & Plan    · PT/OT  · Current recommendations are to nursing rehab  · Plan discharge to [facility] tomorrow time has been set up for 10:00 a m  * Acute respiratory failure with hypoxia (HCC)   Assessment & Plan    · Complicated by RSV infection and history of chronic obstructive pulmonary disease, chest x-ray without evidence of pneumonia  · Completed overnight pulse ox Spo2 to less than 89 about 3 hours and 50 minutes documented    Lowest oxygen saturation 72%  · Pulse oximetry noted to be 92% on room air when awake  · Recommend oxygen at HS 2 L maintain saturations greater than 88%        Seizure disorder (HCC)   Assessment & Plan    · Continue tegretol and depakote        UTI (urinary tract infection)   Assessment & Plan    · POA, finished abx course   · Per ID suspect that she may not had an infectious process referring to the bacteria given that she has a chronic indwelling Baca catheter  · She will be monitored off antibiotics  · Maintain baca (chronic), routine baca care        HLD (hyperlipidemia)   Assessment & Plan    · Continue statin        Dementia   Assessment & Plan · Supportive care  · Continue Wellbutrin        CHF (congestive heart failure) ([facility])   Assessment & Plan    · Suspect possible mild acute exacerbation in the setting of AFib with RVR   · At this time does not appear to be in volume overload  · Will change IV Lasix to oral  · Monitor daily weights and intake and output        COPD (chronic obstructive pulmonary disease) ([facility])   Assessment & Plan    · Without acute exacerbation  · Continue Symbicort b i d  and albuterol p r n  HTN (hypertension)   Assessment & Plan    · BP acceptable  · Continue current meds          VTE Pharmacologic Prophylaxis:   Pharmacologic: Warfarin (Coumadin)  Mechanical VTE Prophylaxis in Place: No    Patient Centered Rounds: I have performed bedside rounds with nursing staff today  Discussions with Specialists or Other Care Team Provider:  Case management    Education and Discussions with Family / Patient:  Patient and daughter at bedside    Time Spent for Care: 30 minutes  More than 50% of total time spent on counseling and coordination of care as described above  Current Length of Stay: 7 day(s)    Current Patient Status: Inpatient   Certification Statement: The patient will continue to require additional inpatient hospital stay due to Safe discharge plan has transportation arrangements made for transfer to rehab tomorrow  Discharge Plan:  Plan discharge to [facility] tomorrow at 10:00 a m  Code Status: Level 2 - DNAR: but accepts endotracheal intubation      Subjective:   Patient with underlying dementia and and noted to be forgetful but pleasant  Poor memory recall therefore poor historian  Denies pain  Reports occasional nonproductive cough  No shortness of breath  No headache or dizziness  Appetite is good      Objective:     Vitals:   Temp (24hrs), Av 9 °F (36 6 °C), Min:97 8 °F (36 6 °C), Max:97 9 °F (36 6 °C)    HR:  [] 90  Resp:  [16-18] 18  BP: (110-137)/(60-78) 112/60  SpO2:  [92 %-97 %] 92 %  Body mass index is 43 7 kg/m²  Input and Output Summary (last 24 hours): Intake/Output Summary (Last 24 hours) at 03/29/18 1603  Last data filed at 03/29/18 1432   Gross per 24 hour   Intake              480 ml   Output             1300 ml   Net             -820 ml       Physical Exam:     Physical Exam   Constitutional: She appears well-developed and well-nourished  No distress  HENT:   Head: Normocephalic  Cardiovascular: Normal rate  An irregular rhythm present  Pulmonary/Chest: Effort normal  No respiratory distress  She has decreased breath sounds  Abdominal: Soft  Bowel sounds are normal    Musculoskeletal: Normal range of motion  She exhibits edema  Trace bilateral ankle edema   Neurological: She is alert  Alert to person and place know she is in the hospital pleasantly confused follows commands   Skin: Skin is warm and dry  Psychiatric: She has a normal mood and affect  Her behavior is normal    Vitals reviewed  Additional Data:     Labs:      Results from last 7 days  Lab Units 03/27/18  0359   WBC Thousand/uL 4 72   HEMOGLOBIN g/dL 11 5   HEMATOCRIT % 35 3   PLATELETS Thousands/uL 169   NEUTROS PCT % 38*   LYMPHS PCT % 41   MONOS PCT % 12   EOS PCT % 5       Results from last 7 days  Lab Units 03/29/18  0456 03/27/18  0355   SODIUM mmol/L 141 140   POTASSIUM mmol/L 4 2 4 3   CHLORIDE mmol/L 103 102   CO2 mmol/L 30 31   BUN mg/dL 33* 29*   CREATININE mg/dL 1 48* 1 36*   CALCIUM mg/dL 9 5 9 1   TOTAL PROTEIN g/dL  --  6 8   BILIRUBIN TOTAL mg/dL  --  0 30   ALK PHOS U/L  --  47   ALT U/L  --  22   AST U/L  --  15   GLUCOSE RANDOM mg/dL 115 114       Results from last 7 days  Lab Units 03/29/18  0641   INR  3 10*       * I Have Reviewed All Lab Data Listed Above  * Additional Pertinent Lab Tests Reviewed:  All Labs Within Last 24 Hours Reviewed    Imaging:    Imaging Reports Reviewed Today Include:  Chest x-ray, CT head  Imaging Personally Reviewed by Myself Includes: None    Recent Cultures (last 7 days):       Results from last 7 days  Lab Units 03/22/18  2256 03/22/18  1925   URINE CULTURE   --  >100,000 cfu/ml Enterococcus faecalis*  40,000-49,000 cfu/ml Stenotrophomonas maltophilia*   INFLUENZA A PCR  None Detected  --    INFLUENZA B PCR  None Detected  --    RSV PCR  Detected*  --        Last 24 Hours Medication List:     Current Facility-Administered Medications:  acetaminophen 650 mg Oral Q6H PRN TANVI Dunn   albuterol 2 5 mg Nebulization Q6H PRN Calvin Zepeda PA-C   aspirin 81 mg Oral Daily Calvin Zepeda PA-C   budesonide-formoterol 2 puff Inhalation BID Calvin Zepeda PA-C   buPROPion 150 mg Oral Daily Calvin Zepeda PA-C   butalbital-acetaminophen-caffeine 1 tablet Oral Q4H PRN TANVI Dunn   carBAMazepine 100 mg Oral TID Calvin Zepeda PA-C   diltiazem 60 mg Oral Q6H [facility] Calvin Zepeda PA-C   divalproex sodium 500 mg Oral Daily Calvin Zepeda PA-C   docusate sodium 100 mg Oral BID Calvin Zepeda PA-C   ferrous sulfate 325 mg Oral BID Calvin Zepeda PA-C   [START ON 3/30/2018] furosemide 40 mg Oral Daily TANVI Jarrell   melatonin 6 mg Oral HS Shannen Richards PA-C   metoprolol 5 mg Intravenous Q6H PRN TANVI Dunn   metoprolol tartrate 50 mg Oral Q12H [facility] Shannen Richards PA-C   ondansetron 4 mg Intravenous Q6H PRN Marissa Tom MD   pravastatin 40 mg Oral Daily With [employer] farias   senna 1 tablet Oral HS Calvin Zepeda PA-C   warfarin 2 mg Oral Once (warfarin) Marissa Tom MD        Today, Patient Was Seen By: TANVI Jarrell    ** Please Note: Dictation voice to text software may have been used in the creation of this document   **

## 2018-03-29 NOTE — PLAN OF CARE
Problem: DISCHARGE PLANNING - CARE MANAGEMENT  Goal: Discharge to post-acute care or home with appropriate resources  INTERVENTIONS:  - Conduct assessment to determine patient/family and health care team treatment goals, and need for post-acute services based on payer coverage, community resources, and patient preferences, and barriers to discharge  - Address psychosocial, clinical, and financial barriers to discharge as identified in assessment in conjunction with the patient/family and health care team  - Arrange appropriate level of post-acute services according to patient's   needs and preference and payer coverage in collaboration with the physician and health care team  - Communicate with and update the patient/family, physician, and health care team regarding progress on the discharge plan  - Arrange appropriate transportation to post-acute venues    Outcome: Progressing  Per recent PT notes and pt's new need for oxygen, she does not appear to be at a level appropriate for return [facility]  Per previous conversation with Michelle Torres, family was agreeable to [address] at [facility]  VINOD s/w Rebecca Bowers at [facility] along with their DON to discuss this patient plans for rehab and return to [place]  Rebecca Bowers states she will have a bed open tomorrow for patient  BLS transport was set up for pt for tomorrow at 10am via [facility] to [facility]  I called pt's daughter Dawna Kunz to make her aware  Awaiting call back  CM dept will follow

## 2018-03-30 VITALS
OXYGEN SATURATION: 93 % | WEIGHT: 218.26 LBS | DIASTOLIC BLOOD PRESSURE: 80 MMHG | HEIGHT: 60 IN | RESPIRATION RATE: 18 BRPM | HEART RATE: 73 BPM | SYSTOLIC BLOOD PRESSURE: 120 MMHG | TEMPERATURE: 97.6 F | BODY MASS INDEX: 42.85 KG/M2

## 2018-03-30 LAB
INR PPP: 2.21 (ref 0.86–1.16)
PROTHROMBIN TIME: 25.1 SECONDS (ref 12.1–14.4)

## 2018-03-30 PROCEDURE — 99239 HOSP IP/OBS DSCHRG MGMT >30: CPT | Performed by: INTERNAL MEDICINE

## 2018-03-30 PROCEDURE — 85610 PROTHROMBIN TIME: CPT | Performed by: INTERNAL MEDICINE

## 2018-03-30 RX ORDER — METOPROLOL TARTRATE 50 MG/1
50 TABLET, FILM COATED ORAL EVERY 12 HOURS SCHEDULED
Refills: 0
Start: 2018-03-30 | End: 2018-05-12 | Stop reason: HOSPADM

## 2018-03-30 RX ORDER — FUROSEMIDE 40 MG/1
40 TABLET ORAL DAILY
Refills: 0
Start: 2018-03-30 | End: 2018-04-18 | Stop reason: HOSPADM

## 2018-03-30 RX ORDER — WARFARIN SODIUM 2 MG/1
2 TABLET ORAL
Refills: 0
Start: 2018-03-30 | End: 2018-05-12 | Stop reason: HOSPADM

## 2018-03-30 RX ORDER — DILTIAZEM HYDROCHLORIDE 60 MG/1
60 TABLET, FILM COATED ORAL EVERY 6 HOURS SCHEDULED
Refills: 0
Start: 2018-03-30 | End: 2018-04-18 | Stop reason: HOSPADM

## 2018-03-30 RX ADMIN — BUDESONIDE AND FORMOTEROL FUMARATE DIHYDRATE 2 PUFF: 160; 4.5 AEROSOL RESPIRATORY (INHALATION) at 09:37

## 2018-03-30 RX ADMIN — CARBAMAZEPINE 100 MG: 100 TABLET, CHEWABLE ORAL at 09:33

## 2018-03-30 RX ADMIN — ASPIRIN 81 MG: 81 TABLET, COATED ORAL at 09:33

## 2018-03-30 RX ADMIN — DIVALPROEX SODIUM 500 MG: 500 TABLET, EXTENDED RELEASE ORAL at 09:40

## 2018-03-30 RX ADMIN — DOCUSATE SODIUM 100 MG: 100 CAPSULE, LIQUID FILLED ORAL at 09:33

## 2018-03-30 RX ADMIN — DILTIAZEM HYDROCHLORIDE 60 MG: 60 TABLET, FILM COATED ORAL at 00:17

## 2018-03-30 RX ADMIN — Medication 325 MG: at 09:34

## 2018-03-30 RX ADMIN — BUPROPION HYDROCHLORIDE 150 MG: 150 TABLET, FILM COATED, EXTENDED RELEASE ORAL at 09:36

## 2018-03-30 RX ADMIN — METOPROLOL TARTRATE 50 MG: 50 TABLET ORAL at 09:33

## 2018-03-30 RX ADMIN — FUROSEMIDE 40 MG: 40 TABLET ORAL at 09:33

## 2018-03-30 NOTE — DISCHARGE INSTRUCTIONS
Patient started on coumadin, INR therapeutic, continue daily check INR for the next 3-5 days daily to determine optimal dosing    Per discussion with patients daughter she will discuss with the patient established cardiologist about managing coumadin after she is discharged from rehab   While in rehab the rehab to manage INR/coumadin dose

## 2018-03-30 NOTE — PLAN OF CARE
Problem: DISCHARGE PLANNING - CARE MANAGEMENT  Goal: Discharge to post-acute care or home with appropriate resources  INTERVENTIONS:  - Conduct assessment to determine patient/family and health care team treatment goals, and need for post-acute services based on payer coverage, community resources, and patient preferences, and barriers to discharge  - Address psychosocial, clinical, and financial barriers to discharge as identified in assessment in conjunction with the patient/family and health care team  - Arrange appropriate level of post-acute services according to patient's   needs and preference and payer coverage in collaboration with the physician and health care team  - Communicate with and update the patient/family, physician, and health care team regarding progress on the discharge plan  - Arrange appropriate transportation to post-acute venues    Outcome: Adequate for Discharge  Dc to [facility] this day  imm letter reviewed with daughter via telephone

## 2018-03-30 NOTE — ASSESSMENT & PLAN NOTE
· Heart rate controlled continue Cardizem and metoprolol   · INR therapeutic continue coumadin  · Goal INR between 2 and 3  · Check INR daily to determine optimal coumadin dose  · Continue 2 mg daily  · Discussed with daughter who will contact her established cardiologist to manage her INR and Coumadin dose after she is discharged from the rehab facility

## 2018-03-30 NOTE — ASSESSMENT & PLAN NOTE
· PT/OT  · Current recommendations are to nursing rehab  · Plan discharge to [facility] today time has been set up for 10:00 a m

## 2018-03-30 NOTE — SOCIAL WORK
CM informed patient is clear for dc this am  Cm contacted patient daughter Jayleen Garciaist regarding dcp and reviewed imm letter  Daughter remains agreeable to [facility], and reports no concerns with patient discharge  Treatment team aware of patient discharge this day  No reported needs at this time

## 2018-03-30 NOTE — DISCHARGE SUMMARY
Discharge- Alma Melchor [birth year], 71 y o  female MRN: [id number]    Unit/Bed#: -01 Encounter: [id number]    Primary Care Provider: Bisi Valdez DO   Date and time admitted to hospital: 3/22/2018  4:28 PM        RSV infection   Assessment & Plan    · Cough improved but still requiring oxygen at HS  · Supportive care, encourage I/S        * New onset atrial flutter (HCC)   Assessment & Plan    · Heart rate controlled continue Cardizem and metoprolol   · INR therapeutic continue coumadin  · Goal INR between 2 and 3  · Check INR daily to determine optimal coumadin dose  · Continue 2 mg daily  · Discussed with daughter who will contact her established cardiologist to manage her INR and Coumadin dose after she is discharged from the rehab facility        Ambulatory dysfunction   Assessment & Plan    · PT/OT  · Current recommendations are to nursing rehab  · Plan discharge to [facility] today time has been set up for 10:00 a m  Acute respiratory failure with hypoxia (HCC)   Assessment & Plan    · Complicated by RSV infection and history of chronic obstructive pulmonary disease, chest x-ray without evidence of pneumonia  · Completed overnight pulse ox Spo2 to less than 89 about 3 hours and 50 minutes documented    Lowest oxygen saturation 72%  · Pulse oximetry noted to be 92% on room air when awake  · Recommend oxygen at HS 2 L maintain saturations greater than 88%        Seizure disorder (HCC)   Assessment & Plan    · Continue tegretol and depakote        UTI (urinary tract infection)   Assessment & Plan    · POA, finished abx course   · Per ID suspect that she may not had an infectious process referring to the bacteria given that she has a chronic indwelling Baca catheter  · She will be monitored off antibiotics  · Maintain baca (chronic), routine baca care        HLD (hyperlipidemia)   Assessment & Plan    · Continue statin        Dementia   Assessment & Plan    · Supportive care  · Continue Wellbutrin        CHF (congestive heart failure) ([facility])   Assessment & Plan    · Suspect possible mild acute exacerbation in the setting of AFib with RVR   · At this time does not appear to be in volume overload  · Continue lasix  · Monitor daily weights and intake and output        COPD (chronic obstructive pulmonary disease) ([facility])   Assessment & Plan    · Without acute exacerbation  · Continue Symbicort b i d  and albuterol p r n  HTN (hypertension)   Assessment & Plan    · BP acceptable  · Continue current meds            Discharging Physician / Practitioner: TANVI Carreno  PCP: Williams Simms DO  Admission Date: 3/22/2018  Discharge Date: 03/30/18    Disposition:      [address]  at [address] to [facility] SNF:   · Not Applicable to this Patient - Not Applicable to this Patient    Reason for Admission: acute hypoxic resp failure, CHF exacer    Discharge Diagnoses:     Please see assessment and plan section above for further details regarding discharge diagnoses  Resolved Problems  Date Reviewed: 3/30/2018    None          Consultations During Hospital Stay:  · Cardiology  · ID    Procedures Performed:     ·      Medication Adjustments and Discharge Medications:  · Summary of Medication Adjustments made as a result of this hospitalization: coumadin added, cardizem added, metoprolol changed to 50 mg BID  · Medication Dosing Tapers - Please refer to Discharge Medication List for details on any medication dosing tapers (if applicable to patient)  · Medications being temporarily held (include recommended restart time): none  · Discharge Medication List: See after visit summary for reconciled discharge medications  Wound Care Recommendations:  When applicable, please see wound care section of After Visit Summary      Diet Recommendations at Discharge:  Diet -        Diet Orders            Start     Ordered    03/23/18 0306  Diet Cardiac; Sodium 2 GM  Diet effective now     Question Answer Comment   Diet Type Cardiac    Cardiac Sodium 2 GM    RD to adjust diet per protocol? No        03/23/18 0305          Instructions for any Catheters / Lines Present at Discharge (including removal date, if applicable): routine baca care (baca is chronic)    Significant Findings / Test Results:     XR chest pa & lateral   Final Result by Kelvin Matos MD (03/26 1730)      Stable chest with cardiomegaly and increased bibasilar lung markings possibly representing interstitial edema due to congestive heart failure  These changes may also be chronic  Workstation performed: [id number]         CT head wo contrast   Final Result by Lilly Davis MD (03/26 1011)      No acute intracranial abnormality  Microangiopathic changes  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  Workstation performed: [id number]         XR chest portable ICU   Final Result by Celi Grijalva MD (03/23 0859)      Coarsened lung markings appear chronic, though component of mild interstitial edema or viral lower respiratory infection is possible  Otherwise, no acute cardiopulmonary findings  Workstation performed: [id number]         CT chest abdomen pelvis wo contrast   Final Result by Fiorella Ortiz MD (03/22 1912)      Stable fat-containing ventral hernia  Stable gallstones without surrounding inflammation  Stable cardiomegaly  Stable small hiatal hernia  Workstation performed: [id number]               Incidental Findings:   · none     Test Results Pending at Discharge (will require follow up):   · none     Outpatient Tests Requested:  · none    Complications:  none    Hospital Course: Kylah Bullard is a 71 y o  female patient with PMH HTN, HLD, COPD, CHF, CAD, chronic baca, seizures, dementia who originally presented to the hospital on 3/22/2018 due to back pain   While in ED was found to be in atrial flutter  This was a new onset  She was given IV lopressor  without response  She was noted to have increased work up breathing and was placed on bipap  BMP was 9000, CTA chest no PE but did show cardiomegaly and mild vascular congestion  She was placed on a Cardizem gtt  She was admitted for new onset atrial flutter with acute hypoxic resp failure with CHF exacerbation  On admission she was admitted to ICU  Cardiology was consulted  ID was consulted  There was no PNA in diagnostic studies  She was negative flu but positive RSV by PCR  No antibiotics were recommended  She was placed on resp protocol  Urine culture was positive in the presence of her chronic baca  No evidence of invasive infection, no antibiotics were recommended  Cardiology placed patient on Cardizem for rate control  Her lopressor was changed to 50 mg bid  Her heart rate was improved  For [facility] she was placed on a heparin to coumadin bridge  On 3/29 the heparin was stopped as INR was therapeutic  Goal INR 2-3  Please see above for further details  She noted noted to have ambulatory dysfunction PT rec STR  Condition at Discharge: stable     Discharge Day Visit / Exam:     Subjective:  Reports harsh cough, nonproductive no pain, no CP or SOB  OOB to chair  Vitals: Blood Pressure: 120/80 (03/30/18 0714)  Pulse: 73 (03/30/18 0714)  Temperature: 97 6 °F (36 4 °C) (03/30/18 0013)  Temp Source: Oral (03/30/18 0013)  Respirations: 18 (03/30/18 0714)  Height: 5' (152 4 cm) (03/22/18 2202)  Weight - Scale: 99 kg (218 lb 4 1 oz) (03/30/18 0600)  SpO2: 93 % (03/30/18 0013)  Exam:   Physical Exam   Constitutional: She appears well-developed and well-nourished  No distress  HENT:   Head: Normocephalic and atraumatic  Neck: Neck supple  Cardiovascular: Normal rate  An irregular rhythm present  Pulmonary/Chest: Effort normal and breath sounds normal  No respiratory distress  Harsh cough noted   Abdominal: Soft   Bowel sounds are normal  She exhibits no distension  There is no tenderness  Musculoskeletal: Normal range of motion  She exhibits edema  She exhibits no tenderness  Trace ankle edema   Neurological: She is alert  No cranial nerve deficit  oriented x 2 peron place but forgetful  Follows all commands, pleasant and cooperative   Skin: Skin is warm and dry  Psychiatric: She has a normal mood and affect  Her behavior is normal    Vitals reviewed  Discussion with Family: daughter yesterday    Goals of Care Discussions:  · Code Status at Discharge: Level 2 - DNAR: but accepts endotracheal intubation  · Were there any Goals of Care Discussions during Hospitalization?: No  · Results of any General Goals of Care Discussions:    · POLST Completed: No   · If POLST Completed, Summary of POLST Agreement Provided Here:    · OK to Rehospitalize if Needed? Yes    Discharge instructions/Information to patient and family:   See after visit summary section titled Discharge Instructions for information provided to patient and family  Planned Readmission: not anticipated      Discharge Statement:  I spent 40 minutes discharging the patient  This time was spent on the day of discharge  I had direct contact with the patient on the day of discharge  Greater than 50% of the total time was spent examining patient, answering all patient questions, arranging and discussing plan of care with patient as well as directly providing post-discharge instructions  Additional time then spent on discharge activities      ** Please Note: This note has been constructed using a voice recognition system **

## 2018-03-30 NOTE — ASSESSMENT & PLAN NOTE
· Suspect possible mild acute exacerbation in the setting of AFib with RVR   · At this time does not appear to be in volume overload  · Continue lasix  · Monitor daily weights and intake and output

## 2018-04-13 ENCOUNTER — HOSPITAL ENCOUNTER (INPATIENT)
Facility: HOSPITAL | Age: 73
LOS: 5 days | Discharge: HOME HEALTH | DRG: 308 | End: 2018-04-18
Attending: INTERNAL MEDICINE | Admitting: INTERNAL MEDICINE
Payer: MEDICARE

## 2018-04-13 DIAGNOSIS — I50.9 CHF (CONGESTIVE HEART FAILURE) (HCC): Chronic | ICD-10-CM

## 2018-04-13 DIAGNOSIS — R41.0 CONFUSION: ICD-10-CM

## 2018-04-13 DIAGNOSIS — F03.90 DEMENTIA (HCC): ICD-10-CM

## 2018-04-13 DIAGNOSIS — I50.33 ACUTE ON CHRONIC DIASTOLIC CONGESTIVE HEART FAILURE (HCC): ICD-10-CM

## 2018-04-13 DIAGNOSIS — F06.30 MOOD DISORDER AS LATE EFFECT OF CEREBROVASCULAR ACCIDENT (CVA): ICD-10-CM

## 2018-04-13 DIAGNOSIS — I48.92 ATRIAL FLUTTER (HCC): Primary | ICD-10-CM

## 2018-04-13 DIAGNOSIS — I69.398 MOOD DISORDER AS LATE EFFECT OF CEREBROVASCULAR ACCIDENT (CVA): ICD-10-CM

## 2018-04-13 DIAGNOSIS — R60.0 BILATERAL LEG EDEMA: ICD-10-CM

## 2018-04-13 PROBLEM — E87.0 HYPERNATREMIA: Status: ACTIVE | Noted: 2018-04-13

## 2018-04-13 PROBLEM — N18.30 STAGE 3 CHRONIC KIDNEY DISEASE (HCC): Chronic | Status: ACTIVE | Noted: 2018-04-13

## 2018-04-13 LAB
ALBUMIN SERPL BCP-MCNC: 3.4 G/DL (ref 3.5–5)
ALP SERPL-CCNC: 57 U/L (ref 46–116)
ALT SERPL W P-5'-P-CCNC: 20 U/L (ref 12–78)
ANION GAP SERPL CALCULATED.3IONS-SCNC: 8 MMOL/L (ref 4–13)
APTT PPP: 32 SECONDS (ref 23–35)
AST SERPL W P-5'-P-CCNC: 18 U/L (ref 5–45)
BASOPHILS # BLD AUTO: 0.04 THOUSANDS/ΜL (ref 0–0.1)
BASOPHILS NFR BLD AUTO: 1 % (ref 0–1)
BILIRUB SERPL-MCNC: 0.3 MG/DL (ref 0.2–1)
BUN SERPL-MCNC: 25 MG/DL (ref 5–25)
CALCIUM SERPL-MCNC: 9.2 MG/DL
CHLORIDE SERPL-SCNC: 108 MMOL/L (ref 100–108)
CO2 SERPL-SCNC: 30 MMOL/L (ref 21–32)
CREAT SERPL-MCNC: 1.71 MG/DL (ref 0.6–1.3)
EOSINOPHIL # BLD AUTO: 0.41 THOUSAND/ΜL (ref 0–0.61)
EOSINOPHIL NFR BLD AUTO: 7 % (ref 0–6)
ERYTHROCYTE [DISTWIDTH] IN BLOOD BY AUTOMATED COUNT: 13.2 % (ref 11.6–15.1)
GFR SERPL CREATININE-BSD FRML MDRD: 30 ML/MIN/1.73SQ M
GLUCOSE SERPL-MCNC: 125 MG/DL (ref 65–140)
HCT VFR BLD AUTO: 36.6 % (ref 34.8–46.1)
HGB BLD-MCNC: 11.7 G/DL (ref 11.5–15.4)
INR PPP: 1.71 (ref 0.86–1.16)
LYMPHOCYTES # BLD AUTO: 2.49 THOUSANDS/ΜL (ref 0.6–4.47)
LYMPHOCYTES NFR BLD AUTO: 41 % (ref 14–44)
MCH RBC QN AUTO: 33.5 PG (ref 26.8–34.3)
MCHC RBC AUTO-ENTMCNC: 32 G/DL (ref 31.4–37.4)
MCV RBC AUTO: 105 FL (ref 82–98)
MONOCYTES # BLD AUTO: 0.56 THOUSAND/ΜL (ref 0.17–1.22)
MONOCYTES NFR BLD AUTO: 9 % (ref 4–12)
NEUTROPHILS # BLD AUTO: 2.58 THOUSANDS/ΜL (ref 1.85–7.62)
NEUTS SEG NFR BLD AUTO: 42 % (ref 43–75)
NRBC BLD AUTO-RTO: 0 /100 WBCS
NT-PROBNP SERPL-MCNC: 5492 PG/ML
PLATELET # BLD AUTO: 139 THOUSANDS/UL (ref 149–390)
PMV BLD AUTO: 12.1 FL (ref 8.9–12.7)
POTASSIUM SERPL-SCNC: 3.7 MMOL/L (ref 3.5–5.3)
PROT SERPL-MCNC: 7.2 G/DL (ref 6.4–8.2)
PROTHROMBIN TIME: 20.5 SECONDS (ref 12.1–14.4)
RBC # BLD AUTO: 3.49 MILLION/UL (ref 3.81–5.12)
SODIUM SERPL-SCNC: 146 MMOL/L (ref 136–145)
TROPONIN I SERPL-MCNC: <0.02 NG/ML
WBC # BLD AUTO: 6.11 THOUSAND/UL (ref 4.31–10.16)

## 2018-04-13 PROCEDURE — 85610 PROTHROMBIN TIME: CPT | Performed by: PHYSICIAN ASSISTANT

## 2018-04-13 PROCEDURE — 96375 TX/PRO/DX INJ NEW DRUG ADDON: CPT

## 2018-04-13 PROCEDURE — 99285 EMERGENCY DEPT VISIT HI MDM: CPT

## 2018-04-13 PROCEDURE — 83880 ASSAY OF NATRIURETIC PEPTIDE: CPT | Performed by: PHYSICIAN ASSISTANT

## 2018-04-13 PROCEDURE — 85025 COMPLETE CBC W/AUTO DIFF WBC: CPT | Performed by: PHYSICIAN ASSISTANT

## 2018-04-13 PROCEDURE — 96374 THER/PROPH/DIAG INJ IV PUSH: CPT

## 2018-04-13 PROCEDURE — 80053 COMPREHEN METABOLIC PANEL: CPT | Performed by: PHYSICIAN ASSISTANT

## 2018-04-13 PROCEDURE — 84484 ASSAY OF TROPONIN QUANT: CPT | Performed by: PHYSICIAN ASSISTANT

## 2018-04-13 PROCEDURE — 99223 1ST HOSP IP/OBS HIGH 75: CPT | Performed by: INTERNAL MEDICINE

## 2018-04-13 PROCEDURE — 85730 THROMBOPLASTIN TIME PARTIAL: CPT | Performed by: PHYSICIAN ASSISTANT

## 2018-04-13 PROCEDURE — 36415 COLL VENOUS BLD VENIPUNCTURE: CPT | Performed by: PHYSICIAN ASSISTANT

## 2018-04-13 PROCEDURE — 93005 ELECTROCARDIOGRAM TRACING: CPT

## 2018-04-13 RX ORDER — ATORVASTATIN CALCIUM 10 MG/1
10 TABLET, FILM COATED ORAL DAILY
Status: ON HOLD | COMMUNITY
End: 2018-12-14

## 2018-04-13 RX ORDER — METOPROLOL TARTRATE 5 MG/5ML
5 INJECTION INTRAVENOUS ONCE
Status: COMPLETED | OUTPATIENT
Start: 2018-04-13 | End: 2018-04-13

## 2018-04-13 RX ORDER — DILTIAZEM HYDROCHLORIDE 5 MG/ML
10 INJECTION INTRAVENOUS ONCE
Status: COMPLETED | OUTPATIENT
Start: 2018-04-13 | End: 2018-04-13

## 2018-04-13 RX ORDER — FLUTICASONE FUROATE AND VILANTEROL 100; 25 UG/1; UG/1
1 POWDER RESPIRATORY (INHALATION) DAILY
Status: ON HOLD | COMMUNITY
End: 2018-12-14

## 2018-04-13 RX ADMIN — DILTIAZEM HYDROCHLORIDE 10 MG: 5 INJECTION INTRAVENOUS at 22:01

## 2018-04-13 RX ADMIN — DILTIAZEM HYDROCHLORIDE 30 MG: 30 TABLET, FILM COATED ORAL at 22:46

## 2018-04-13 RX ADMIN — METOPROLOL TARTRATE 5 MG: 5 INJECTION, SOLUTION INTRAVENOUS at 21:28

## 2018-04-14 ENCOUNTER — APPOINTMENT (INPATIENT)
Dept: ULTRASOUND IMAGING | Facility: HOSPITAL | Age: 73
DRG: 308 | End: 2018-04-14
Payer: MEDICARE

## 2018-04-14 LAB
ANION GAP SERPL CALCULATED.3IONS-SCNC: 5 MMOL/L (ref 4–13)
ATRIAL RATE: 129 BPM
BUN SERPL-MCNC: 24 MG/DL (ref 5–25)
CALCIUM SERPL-MCNC: 9 MG/DL
CHLORIDE SERPL-SCNC: 110 MMOL/L (ref 100–108)
CO2 SERPL-SCNC: 30 MMOL/L (ref 21–32)
CREAT SERPL-MCNC: 1.54 MG/DL (ref 0.6–1.3)
ERYTHROCYTE [DISTWIDTH] IN BLOOD BY AUTOMATED COUNT: 13.2 % (ref 11.6–15.1)
GFR SERPL CREATININE-BSD FRML MDRD: 34 ML/MIN/1.73SQ M
GLUCOSE SERPL-MCNC: 101 MG/DL (ref 65–140)
HCT VFR BLD AUTO: 34 % (ref 34.8–46.1)
HGB BLD-MCNC: 10.7 G/DL (ref 11.5–15.4)
MAGNESIUM SERPL-MCNC: 2 MG/DL (ref 1.6–2.6)
MCH RBC QN AUTO: 33 PG (ref 26.8–34.3)
MCHC RBC AUTO-ENTMCNC: 31.5 G/DL (ref 31.4–37.4)
MCV RBC AUTO: 105 FL (ref 82–98)
PHOSPHATE SERPL-MCNC: 3.8 MG/DL (ref 2.3–4.1)
PLATELET # BLD AUTO: 121 THOUSANDS/UL (ref 149–390)
PMV BLD AUTO: 12.1 FL (ref 8.9–12.7)
POTASSIUM SERPL-SCNC: 3.5 MMOL/L (ref 3.5–5.3)
QRS AXIS: 15 DEGREES
QRSD INTERVAL: 96 MS
QT INTERVAL: 412 MS
QTC INTERVAL: 596 MS
RBC # BLD AUTO: 3.24 MILLION/UL (ref 3.81–5.12)
SODIUM SERPL-SCNC: 145 MMOL/L (ref 136–145)
T WAVE AXIS: 64 DEGREES
VENTRICULAR RATE: 126 BPM
WBC # BLD AUTO: 5.71 THOUSAND/UL (ref 4.31–10.16)

## 2018-04-14 PROCEDURE — 80048 BASIC METABOLIC PNL TOTAL CA: CPT | Performed by: INTERNAL MEDICINE

## 2018-04-14 PROCEDURE — 99232 SBSQ HOSP IP/OBS MODERATE 35: CPT | Performed by: INTERNAL MEDICINE

## 2018-04-14 PROCEDURE — 85027 COMPLETE CBC AUTOMATED: CPT | Performed by: INTERNAL MEDICINE

## 2018-04-14 PROCEDURE — 84100 ASSAY OF PHOSPHORUS: CPT | Performed by: INTERNAL MEDICINE

## 2018-04-14 PROCEDURE — 99223 1ST HOSP IP/OBS HIGH 75: CPT | Performed by: INTERNAL MEDICINE

## 2018-04-14 PROCEDURE — 93010 ELECTROCARDIOGRAM REPORT: CPT | Performed by: INTERNAL MEDICINE

## 2018-04-14 PROCEDURE — 83735 ASSAY OF MAGNESIUM: CPT | Performed by: INTERNAL MEDICINE

## 2018-04-14 PROCEDURE — 94664 DEMO&/EVAL PT USE INHALER: CPT

## 2018-04-14 PROCEDURE — 93971 EXTREMITY STUDY: CPT

## 2018-04-14 RX ORDER — METOPROLOL TARTRATE 50 MG/1
50 TABLET, FILM COATED ORAL EVERY 12 HOURS SCHEDULED
Status: DISCONTINUED | OUTPATIENT
Start: 2018-04-14 | End: 2018-04-18 | Stop reason: HOSPADM

## 2018-04-14 RX ORDER — FERROUS SULFATE 325(65) MG
325 TABLET ORAL 2 TIMES DAILY
Status: DISCONTINUED | OUTPATIENT
Start: 2018-04-14 | End: 2018-04-18 | Stop reason: HOSPADM

## 2018-04-14 RX ORDER — FENOFIBRATE 48 MG/1
48 TABLET, COATED ORAL DAILY
Status: DISCONTINUED | OUTPATIENT
Start: 2018-04-14 | End: 2018-04-18 | Stop reason: HOSPADM

## 2018-04-14 RX ORDER — ATORVASTATIN CALCIUM 10 MG/1
10 TABLET, FILM COATED ORAL DAILY
Status: DISCONTINUED | OUTPATIENT
Start: 2018-04-14 | End: 2018-04-18 | Stop reason: HOSPADM

## 2018-04-14 RX ORDER — BUDESONIDE AND FORMOTEROL FUMARATE DIHYDRATE 160; 4.5 UG/1; UG/1
2 AEROSOL RESPIRATORY (INHALATION) 2 TIMES DAILY
Status: DISCONTINUED | OUTPATIENT
Start: 2018-04-14 | End: 2018-04-18 | Stop reason: HOSPADM

## 2018-04-14 RX ORDER — LEVALBUTEROL 1.25 MG/.5ML
1.25 SOLUTION, CONCENTRATE RESPIRATORY (INHALATION) EVERY 6 HOURS PRN
Status: DISCONTINUED | OUTPATIENT
Start: 2018-04-14 | End: 2018-04-18 | Stop reason: HOSPADM

## 2018-04-14 RX ORDER — ONDANSETRON 2 MG/ML
4 INJECTION INTRAMUSCULAR; INTRAVENOUS EVERY 6 HOURS PRN
Status: DISCONTINUED | OUTPATIENT
Start: 2018-04-14 | End: 2018-04-18 | Stop reason: HOSPADM

## 2018-04-14 RX ORDER — LEVOTHYROXINE SODIUM 88 UG/1
88 TABLET ORAL EVERY MORNING
Status: DISCONTINUED | OUTPATIENT
Start: 2018-04-14 | End: 2018-04-18 | Stop reason: HOSPADM

## 2018-04-14 RX ORDER — ASPIRIN 81 MG/1
81 TABLET ORAL DAILY
Status: DISCONTINUED | OUTPATIENT
Start: 2018-04-14 | End: 2018-04-18 | Stop reason: HOSPADM

## 2018-04-14 RX ORDER — FUROSEMIDE 40 MG/1
40 TABLET ORAL DAILY
Status: DISCONTINUED | OUTPATIENT
Start: 2018-04-14 | End: 2018-04-14

## 2018-04-14 RX ORDER — WARFARIN SODIUM 2.5 MG/1
2.5 TABLET ORAL
Status: DISCONTINUED | OUTPATIENT
Start: 2018-04-14 | End: 2018-04-16

## 2018-04-14 RX ORDER — DILTIAZEM HYDROCHLORIDE 60 MG/1
60 TABLET, FILM COATED ORAL EVERY 6 HOURS SCHEDULED
Status: DISCONTINUED | OUTPATIENT
Start: 2018-04-14 | End: 2018-04-15

## 2018-04-14 RX ORDER — ALBUTEROL SULFATE 2.5 MG/3ML
2.5 SOLUTION RESPIRATORY (INHALATION) EVERY 6 HOURS PRN
Status: DISCONTINUED | OUTPATIENT
Start: 2018-04-14 | End: 2018-04-14

## 2018-04-14 RX ORDER — BUPROPION HYDROCHLORIDE 150 MG/1
150 TABLET, EXTENDED RELEASE ORAL DAILY
Status: DISCONTINUED | OUTPATIENT
Start: 2018-04-14 | End: 2018-04-18 | Stop reason: HOSPADM

## 2018-04-14 RX ORDER — ACETAMINOPHEN 325 MG/1
500 TABLET ORAL EVERY 6 HOURS PRN
Status: DISCONTINUED | OUTPATIENT
Start: 2018-04-14 | End: 2018-04-18 | Stop reason: HOSPADM

## 2018-04-14 RX ORDER — AMOXICILLIN 250 MG
1 CAPSULE ORAL DAILY PRN
Status: DISCONTINUED | OUTPATIENT
Start: 2018-04-14 | End: 2018-04-18 | Stop reason: HOSPADM

## 2018-04-14 RX ORDER — DIVALPROEX SODIUM 250 MG/1
500 TABLET, EXTENDED RELEASE ORAL DAILY
Status: DISCONTINUED | OUTPATIENT
Start: 2018-04-14 | End: 2018-04-18

## 2018-04-14 RX ORDER — FUROSEMIDE 10 MG/ML
40 INJECTION INTRAMUSCULAR; INTRAVENOUS
Status: DISCONTINUED | OUTPATIENT
Start: 2018-04-14 | End: 2018-04-15

## 2018-04-14 RX ORDER — MELATONIN
2000 DAILY
Status: DISCONTINUED | OUTPATIENT
Start: 2018-04-14 | End: 2018-04-18 | Stop reason: HOSPADM

## 2018-04-14 RX ORDER — METOPROLOL TARTRATE 5 MG/5ML
5 INJECTION INTRAVENOUS EVERY 6 HOURS PRN
Status: DISCONTINUED | OUTPATIENT
Start: 2018-04-14 | End: 2018-04-18 | Stop reason: HOSPADM

## 2018-04-14 RX ORDER — CARBAMAZEPINE 100 MG/1
100 TABLET, CHEWABLE ORAL 3 TIMES DAILY
Status: DISCONTINUED | OUTPATIENT
Start: 2018-04-14 | End: 2018-04-18 | Stop reason: HOSPADM

## 2018-04-14 RX ADMIN — ATORVASTATIN CALCIUM 10 MG: 10 TABLET, FILM COATED ORAL at 11:18

## 2018-04-14 RX ADMIN — CARBAMAZEPINE 100 MG: 100 TABLET, CHEWABLE ORAL at 01:27

## 2018-04-14 RX ADMIN — DIVALPROEX SODIUM 500 MG: 250 TABLET, FILM COATED, EXTENDED RELEASE ORAL at 11:18

## 2018-04-14 RX ADMIN — WARFARIN SODIUM 2.5 MG: 2.5 TABLET ORAL at 17:26

## 2018-04-14 RX ADMIN — METOPROLOL TARTRATE 50 MG: 50 TABLET ORAL at 11:18

## 2018-04-14 RX ADMIN — VITAMIN D, TAB 1000IU (100/BT) 2000 UNITS: 25 TAB at 11:18

## 2018-04-14 RX ADMIN — LEVOTHYROXINE SODIUM 88 MCG: 88 TABLET ORAL at 05:12

## 2018-04-14 RX ADMIN — ACETAMINOPHEN 488 MG: 325 TABLET, FILM COATED ORAL at 20:28

## 2018-04-14 RX ADMIN — DILTIAZEM HYDROCHLORIDE 60 MG: 60 TABLET, FILM COATED ORAL at 01:27

## 2018-04-14 RX ADMIN — METOPROLOL TARTRATE 50 MG: 50 TABLET ORAL at 21:50

## 2018-04-14 RX ADMIN — CARBAMAZEPINE 100 MG: 100 TABLET, CHEWABLE ORAL at 21:50

## 2018-04-14 RX ADMIN — BUPROPION HYDROCHLORIDE 150 MG: 150 TABLET, FILM COATED, EXTENDED RELEASE ORAL at 11:18

## 2018-04-14 RX ADMIN — FENOFIBRATE 48 MG: 48 TABLET ORAL at 11:19

## 2018-04-14 RX ADMIN — DILTIAZEM HYDROCHLORIDE 60 MG: 60 TABLET, FILM COATED ORAL at 23:11

## 2018-04-14 RX ADMIN — METOPROLOL TARTRATE 50 MG: 50 TABLET ORAL at 01:27

## 2018-04-14 RX ADMIN — BUDESONIDE AND FORMOTEROL FUMARATE DIHYDRATE 2 PUFF: 160; 4.5 AEROSOL RESPIRATORY (INHALATION) at 17:26

## 2018-04-14 RX ADMIN — DILTIAZEM HYDROCHLORIDE 60 MG: 60 TABLET, FILM COATED ORAL at 05:12

## 2018-04-14 RX ADMIN — CARBAMAZEPINE 100 MG: 100 TABLET, CHEWABLE ORAL at 17:25

## 2018-04-14 RX ADMIN — DILTIAZEM HYDROCHLORIDE 60 MG: 60 TABLET, FILM COATED ORAL at 11:22

## 2018-04-14 RX ADMIN — FUROSEMIDE 40 MG: 10 INJECTION, SOLUTION INTRAMUSCULAR; INTRAVENOUS at 11:22

## 2018-04-14 RX ADMIN — CARBAMAZEPINE 100 MG: 100 TABLET, CHEWABLE ORAL at 11:19

## 2018-04-14 RX ADMIN — BUDESONIDE AND FORMOTEROL FUMARATE DIHYDRATE 2 PUFF: 160; 4.5 AEROSOL RESPIRATORY (INHALATION) at 11:22

## 2018-04-14 RX ADMIN — DILTIAZEM HYDROCHLORIDE 60 MG: 60 TABLET, FILM COATED ORAL at 17:25

## 2018-04-14 RX ADMIN — ASPIRIN 81 MG: 81 TABLET, COATED ORAL at 11:19

## 2018-04-14 RX ADMIN — FUROSEMIDE 40 MG: 10 INJECTION, SOLUTION INTRAMUSCULAR; INTRAVENOUS at 17:25

## 2018-04-14 RX ADMIN — FERROUS SULFATE TAB 325 MG (65 MG ELEMENTAL FE) 325 MG: 325 (65 FE) TAB at 11:19

## 2018-04-14 RX ADMIN — FERROUS SULFATE TAB 325 MG (65 MG ELEMENTAL FE) 325 MG: 325 (65 FE) TAB at 17:26

## 2018-04-14 NOTE — PROGRESS NOTES
Progress Note Justus Hernandes [birth year], 71 y o  female MRN: [id number]    Unit/Bed#: [facility] Encounter: [id number]    Primary Care Provider: Lorenzo Young DO   Date and time admitted to hospital: 4/13/2018  7:08 PM        Atrial flutter [facility])   Assessment & Plan    · Patient initially had atrial flutter/atrial fibrillation with rapid ventricle response in the emergency room  Rapid ventricle response had resolved with Cardizem  Patient was just diagnosed with atrial flutter/atrial fibrillation last month here at [facility]  · Continue with antiarrhythmic medications  Continue with Coumadin  · Cardiology consult  · Patient's cardiologist in [place] is recommending direct oral anticoagulant instead of Coumadin  I will leave this decision to our inpatient cardiologist here at [facility]  · Questionable role of cardioversion; ablation  We will ask Cardiology about this  This was also mentioned to the patient by her outpatient cardiologist   · No echocardiogram done on the last admission  We will ask Cardiology also about this, if patient needs an echocardiogram on this admission  · TSH was normal last March 22nd of this year  · Patient's rapid ventricle response may also be due to the fact that patient is on a lot of COPD medications/inhalers  Patient is on 2 long-acting beta agonists and inhaled corticosteroid; patient is also both on albuterol nebulization as well as DuoNeb  According to the patient's daughter, she also mentioned this to the staff at the rehab facility  Thus, we will only use 1 long-acting beta agonists and ICS and will just use albuterol nebulization and not the DuoNeb  · I will increase the dose of patient's Coumadin as patient's INR is still subtherapeutic  According to the patient's daughter, patient took all of her medications today, including the Coumadin  Thus next dose will be tomorrow            Stage 3 chronic kidney disease   Assessment & Plan    · Review of patient's BMPs, revealed that the last 1 was 1 48   · Presently, it is 1 7  · Monitor patient's kidney function  · Avoid nephrotoxins  · Avoid hypotension  · If kidney function/creatinine worsens significantly, we will need to do further workup and management; may need to cut back on patient's Lasix        CHF (congestive heart failure) ([facility])   Assessment & Plan    · Chronic diastolic congestive heart failure, presently compensated  · Continue cardiovascular and heart failure medications  · If kidney function worsens significantly, we may need to cut back or hold off on patient's Lasix dose  · Cardiology consult  Bilateral leg edema   Assessment & Plan    · Patient feels a tightness at the right lower extremity since her last admission here  Thus rule out for DVT  Patient did not have any lower extremity duplex scan that was done on that admission  · Patient's bilateral leg edema, may be due to patient's history of congestive heart failure, but with symptoms on the right lower extremity with tightness and pain, we have to rule out possibility lower extremity DVT  If patient is positive for lower extremity DVT, with new onset atrial fibrillation just diagnosed a few weeks ago, and recurrent RVR we have to rule out possibility of PE, thus patient may need imaging study for this (possible V/Q scan as patient has chronic kidney disease)  Hypernatremia   Assessment & Plan    · Mild  · Likely due to diuresis  · Repeat BMP tomorrow  · If worsening significantly, we will need to decrease the dose or hold off the dose of Lasix  Dementia   Assessment & Plan    · Patient has short-term memory loss  · Supportive care  COPD (chronic obstructive pulmonary disease) ([facility])   Assessment & Plan    · No exacerbation  · Continue with only 1 long-acting beta agonist and ICS (according to the medication list, patient is on 2 different long-acting inhalers)    I will only continue with the albuterol inhaler on as needed basis and will discontinue with the DuoNeb  This is due to patient's on and off rapid ventricle response  · Oxygen p r n  VTE Prophylaxis: Warfarin (Coumadin)  / reason for no mechanical VTE prophylaxis Rule out DVT with bilateral lower extremity swelling   Code Status:  DNR DNI  POLST: POLST form is not discussed and not completed at this time  Discussion with family:  I spoke to the patient's daughter at bedside  I got patient's HPI from her also  I discussed our findings and plans  I answered all questions and concerns  Anticipated Length of Stay:  Patient will be admitted on an Inpatient basis with an anticipated length of stay of  greater than 2 midnights  Justification for Hospital Stay:  Due to the above findings and plans  Total Time for Visit, including Counseling / Coordination of Care: 1 hour  Greater than 50% of this total time spent on direct patient counseling and coordination of care  Chief Complaint:  Was sent here from the cardiologist's office due to rapid heart rate  History of Present Illness: Rachell Davis is a 71 y o  female who presents with rapid heart rate to the emergency room at [facility] [place]  Patient has history of dementia and has short-term memory loss, thus patient's HPI was from the patient's daughter at bedside  According to the patient's daughter, her mother was just here a few weeks ago, and was diagnosed with a her respiratory tract infection and a new onset heart rhythm abnormality  From a review of patient's previous records, patient had a new onset atrial flutter at that time  Patient was discharged on Coumadin and prescribed Cardizem and metoprolol  According to the patient's daughter, patient was very physically deconditioned here as she spent several days here was even transferred to the ICU, thus, patient was eventually discharge to a rehabilitation facility    Earlier today, from the rehabilitation facility, patient was brought to her cardiologist up in [facility] and the [facility] in the office, her EKG revealed significant rapid heart rate at 140-150 per minute  Thus, patient's cardiologist advised the patient to go to the nearest emergency room  However, since patient lives here in [facility], they opted to come back here  According to the patient's daughter, patient's cardiologist told her, that patient's Coumadin may need to be changed to a new blood thinner, and that patient may eventually need a procedure on her heart to control the heart rate/heart rhythm  On questioning of the patient, patient denied any chest pains, but admitted to having some shortness of breath awhile ago when her heart rate was fast   According the patient's daughter, patient also had some nausea episodes earlier today but no vomiting episodes  Patient denies any chest pains or any abdominal pains  Patient admits to some mild headache right now while here  Patient also admits to right lower extremity tightness which she had been feeling since she was admitted here  Patient's daughter is concerned about a blood clot in the leg, which she was also told by patient's outpatient cardiologist   Patient still has some occasional cough but not as bad as before  Patient denies any other symptoms other the ones mentioned above  In the emergency room, patient was found to have atrial flutter/atrial fibrillation with rapid ventricular response at 130 per minute  Patient was given doses of IV beta blocker and eventually IV Cardizem, which made patient's heart rate normal       Review of Systems:    Review of Systems     Ten point review systems done and they were negative except for the ones I mentioned in my HPI  Patient denies any fever or chills  Patient denies any dizziness or any loss of consciousness  Patient denies any bowel wound problems or any urinary problems        Past Medical and Surgical History:     Past Medical History:   Diagnosis Date    Brain aneurysm     CAD (coronary artery disease)     Cardiac disease     [facility]     CKD (chronic kidney disease)     COPD [facility] obstructive [facility]) [facility]     Dementia     Dementia     Disease of thyroid gland     Hyperlipidemia     Hyperlipidemia     Hypertension     [facility] infarction[facility]     Migraine     Renal disorder     Seizures ([facility] )     Stroke ([facility] )        Past Surgical History:   Procedure Laterality Date    APPENDECTOMY      BLADDER TUMOR EXCISION      BRAIN SURGERY      CARDIAC SURGERY      CORONARY STENT PLACEMENT      5 stents    EYE SURGERY      MANDIBLE FRACTURE SURGERY      TONSILLECTOMY      TUBAL LIGATION      UTERINE FIBROID SURGERY         Meds/Allergies:    Prior to Admission medications    Medication Sig Start Date End Date Taking?  Authorizing Provider   atorvastatin (LIPITOR) 10 mg tablet Take 10 mg by mouth daily   Yes Historical Provider, MD   fluticasone furoate-vilanterol (BREO ELLIPTA) Inhale 1 puff daily   Yes Historical Provider, MD   acetaminophen (TYLENOL) 500 mg tablet Take 500 mg by mouth every 6 (six) hours as needed for mild pain    Historical Provider, MD   albuterol (2 5 mg/3 mL) 0 083 % nebulizer solution Take 2 5 mg by nebulization every 6 (six) hours as needed for wheezing    Historical Provider, MD   albuterol (PROVENTIL HFA,VENTOLIN HFA) 90 mcg/act inhaler Inhale 2 puffs every 6 (six) hours as needed for wheezing    Historical Provider, MD   aspirin (ECOTRIN LOW STRENGTH) 81 mg EC tablet Take 81 mg by mouth daily    Historical Provider, MD   budesonide-formoterol (SYMBICORT) 160-4 5 mcg/act inhaler Inhale 2 puffs 2 (two) times a day    Historical Provider, MD   buPROPion (WELLBUTRIN SR) 150 mg 12 hr tablet Take 150 mg by mouth daily    Historical Provider, MD   carBAMazepine (TEGretol) 100 mg chewable tablet Chew 100 mg 3 (three) times a day    Historical Provider, MD   Cholecalciferol (VITAMIN D) 2000 units CAPS Take 1 capsule by mouth daily      Historical Provider, MD   diltiazem (CARDIZEM) 60 mg tablet Take 1 tablet (60 mg total) by mouth every 6 (six) hours 3/30/18   TANVI Hernandez   divalproex sodium (DEPAKOTE ER) 250 mg 24 hr tablet Take 500 mg by mouth daily    Historical Provider, MD   fenofibrate micronized (LOFIBRA) 67 MG capsule Take 67 mg by mouth every morning before breakfast    Historical Provider, MD   ferrous sulfate 325 (65 Fe) mg tablet Take 325 mg by mouth 2 (two) times a day    Historical Provider, MD   furosemide (LASIX) 40 mg tablet Take 1 tablet (40 mg total) by mouth daily 3/30/18   TANVI Hernandez   ipratropium-albuterol (DUO-NEB) 0 5-2 5 mg/mL Take 3 mL by nebulization every 6 (six) hours    Historical Provider, MD   Levothyroxine Sodium 88 MCG CAPS Take 88 mcg by mouth every morning      Historical Provider, MD   metoprolol tartrate (LOPRESSOR) 50 mg tablet Take 1 tablet (50 mg total) by mouth every 12 (twelve) hours 3/30/18   TANVI Hernandez   Sennosides-Docusate Sodium (SENNA PLUS PO) Take by mouth daily as needed    Historical Provider, MD   warfarin (COUMADIN) 2 mg tablet Take 1 tablet (2 mg total) by mouth daily Goal INR 2 0-3 0 3/30/18   TANVI Hernandez   simvastatin (ZOCOR) 20 mg tablet Take 20 mg by mouth daily at bedtime  4/13/18  Historical Provider, MD     I reviewed patient's medication list   It is important to note, that I discussed patient's medication list with patient's daughter as there is redundancy in patient's inhalers  Patient is on 2 long-acting beta agonist and ICS  Patient is also on albuterol inhaler/nebulization as well as DuoNeb nebulization  Allergies:    Allergies   Allergen Reactions    Ramipril Anaphylaxis    Spiriva Handihaler [Tiotropium Bromide Monohydrate] Swelling    Penicillins        Social History:     Marital Status:      History   Alcohol Use No     History   Smoking Status    Former Smoker   Smokeless Tobacco    Never Used     History   Drug Use No       Family History:    History reviewed  No pertinent family history  Physical Exam:     Vitals:   Blood Pressure: 112/70 (04/13/18 2230)  Pulse: 72 (04/13/18 2230)  Temperature: 98 °F (36 7 °C) (04/13/18 1913)  Temp Source: Oral (04/13/18 1913)  Respirations: 20 (04/13/18 2230)  Height: 5' (152 4 cm) (04/13/18 1913)  Weight - Scale: 83 7 kg (184 lb 9 oz) (04/13/18 1913)  SpO2: 93 % (04/13/18 2230)    Physical Exam   Constitutional: No distress  HENT:   Head: Normocephalic and atraumatic  Mouth/Throat: Oropharynx is clear and moist  No oropharyngeal exudate  Eyes: Right eye exhibits no discharge  Left eye exhibits no discharge  No scleral icterus  Neck: No JVD present  No tracheal deviation present  Cardiovascular: Normal rate  Exam reveals no gallop and no friction rub  No murmur heard  Irregularly irregular heart rhythm  Pulmonary/Chest: Effort normal and breath sounds normal  No stridor  No respiratory distress  She has no wheezes  She has no rales  Abdominal: Soft  Bowel sounds are normal  She exhibits no distension  There is no tenderness  There is no rebound and no guarding  Musculoskeletal: She exhibits edema  She exhibits no tenderness or deformity  Positive for bilateral lower extremity edema, approximately grade 1-2   Neurological: She is alert  No cranial nerve deficit  Patient answers simple questions appropriately  However, patient has short-term memory loss  Skin: Skin is warm  No rash noted  She is not diaphoretic  No erythema  No pallor  Psychiatric: She has a normal mood and affect  Her behavior is normal    Vitals reviewed  Additional Data:     Lab Results: I have personally reviewed pertinent reports          Results from last 7 days  Lab Units 04/13/18 2022   WBC Thousand/uL 6 11   HEMOGLOBIN g/dL 11 7   HEMATOCRIT % 36 6   PLATELETS Thousands/uL 139*   NEUTROS PCT % 42*   LYMPHS PCT % 41   MONOS PCT % 9   EOS PCT % 7*       Results from last 7 days  Lab Units 04/13/18 2022   SODIUM mmol/L 146*   POTASSIUM mmol/L 3 7   CHLORIDE mmol/L 108   CO2 mmol/L 30   BUN mg/dL 25   CREATININE mg/dL 1 71*   CALCIUM mg/dL 9 2   TOTAL PROTEIN g/dL 7 2   BILIRUBIN TOTAL mg/dL 0 30   ALK PHOS U/L 57   ALT U/L 20   AST U/L 18   GLUCOSE RANDOM mg/dL 125       Results from last 7 days  Lab Units 04/13/18 2022   INR  1 71*       Imaging: I have personally reviewed pertinent reports  No orders to display       EKG, Pathology, and Other Studies Reviewed on Admission:   · EKG:  Atrial fibrillation/atrial flutter, with rapid ventricular response at 126 per minute  No significant change with previous EKG dated March 25, 2018  Presently, on telemetry, patient's heart rhythm is atrial flutter with normal ventricular response  Allscripts / Epic Records Reviewed: Yes     ** Please Note: This note has been constructed using a voice recognition system   **

## 2018-04-14 NOTE — ED NOTES
Patient had no urine in leg bag connected to baca catheter  Patient's pants and disposable briefs removed to assess catheter insertion site  Catheter with balloon inflated was laying inside disposable briefs  New baca catheter inserted       Guerda Abbott RN  04/13/18 [id number]

## 2018-04-14 NOTE — CONSULTS
Consultation - Cardiology Team One  Yoly Rodas 71 y o  female MRN: [id number]  Unit/Bed#: -01 Encounter: [id number]    Inpatient consult to Cardiology  Consult performed by: Bebeto Shelton ordered by: Manohar Can          Physician Requesting Consult: Cassy Bansal MD  Reason for Consult / Principal Problem: A flutter, CHF    HPI: Cardiologist Dr Mims Shawna is a 71y o  year old female who has a history of atrial flutter, chronic diastolic Congestive heart failure, hypertension, hyperlipidemia, CAD S/P PCI, CKD 3, COPD, dementia presenting with atrial flutter and SOB  Patient is a poor historian given baseline dementia, history obtained per chart review  Patient was hospitalized a few weeks ago for a respiratory tract infection and new onset atrial flutter  Was discharged on Coumadin and prescribed Cardizem and metoprolol  Yesterday, patient was seen by cardiologist and was noted to be at HR 140s to 150s on EKG, advised to go to the emergency room  History limited, however upon prompting, patient states that she remembers that she is here because she developed SOB  No chest pain  HR currently controlled after receiving IV Lopressor and IV Cardizem    Patient cannot remember the name of her cardiologist       REVIEW OF SYSTEMS:  Constitutional:  Denies fever or chills   Eyes:  Denies change in visual acuity   HENT:  Denies nasal congestion or sore throat   Respiratory:  +SOB  Cardiovascular:  Denies chest pain or edema   GI:  Denies abdominal pain, nausea, vomiting, bloody stools or diarrhea   :  Denies dysuria, frequency, difficulty in micturition and nocturia  Musculoskeletal:  Denies back pain or joint pain   Neurologic:  Denies headache, focal weakness or sensory changes   Endocrine:  Denies polyuria or polydipsia   Lymphatic:  Denies swollen glands   Psychiatric:  Denies depression or anxiety     Historical Information   Past Medical History:   Diagnosis Date    Brain aneurysm     CAD (coronary artery disease)     Cardiac disease     CHF (congestive heart failure) (HCC)     CKD (chronic kidney disease)     COPD (chronic obstructive pulmonary disease) (HCC)     Dementia     Dementia     Disease of thyroid gland     Hyperlipidemia     Hyperlipidemia     Hypertension     [facility]     Migraine     Renal disorder     Seizures ([facility] )     Stroke ([place] [id number] )      Past Surgical History:   Procedure Laterality Date    APPENDECTOMY      BLADDER TUMOR EXCISION      BRAIN SURGERY      CARDIAC SURGERY      CORONARY STENT PLACEMENT      5 stents    EYE SURGERY      MANDIBLE FRACTURE SURGERY      TONSILLECTOMY      TUBAL LIGATION      UTERINE FIBROID SURGERY       History   Alcohol Use No     History   Drug Use No     History   Smoking Status    Former Smoker   Smokeless Tobacco    Never Used       Family History:   Family History   Problem Relation Age of Onset    Heart disease Father        MEDS & ALLERGIES:  all current active meds have been reviewed and current meds: Current Facility-Administered Medications   Medication Dose Route Frequency    acetaminophen (TYLENOL) tablet 488 mg  488 mg Oral Q6H PRN    aspirin (ECOTRIN LOW STRENGTH) EC tablet 81 mg  81 mg Oral Daily    atorvastatin (LIPITOR) tablet 10 mg  10 mg Oral Daily    budesonide-formoterol (SYMBICORT) 160-4 5 mcg/act inhaler 2 puff  2 puff Inhalation BID    buPROPion (WELLBUTRIN SR) 12 hr tablet 150 mg  150 mg Oral Daily    carBAMazepine (TEGretol) chewable tablet 100 mg  100 mg Oral TID    cholecalciferol (VITAMIN D3) tablet 2,000 Units  2,000 Units Oral Daily    diltiazem (CARDIZEM) tablet 60 mg  60 mg Oral Q6H [address]    divalproex sodium (DEPAKOTE ER) 24 hr tablet 500 mg  500 mg Oral Daily    fenofibrate (TRICOR) tablet 48 mg  48 mg Oral Daily    ferrous sulfate tablet 325 mg  325 mg Oral BID    furosemide (LASIX) tablet 40 mg  40 mg Oral Daily    levalbuterol (XOPENEX) inhalation solution 1 25 mg  1 25 mg Nebulization Q6H PRN    levothyroxine tablet 88 mcg  88 mcg Oral QAM    metoprolol (LOPRESSOR) injection 5 mg  5 mg Intravenous Q6H PRN    metoprolol tartrate (LOPRESSOR) tablet 50 mg  50 mg Oral Q12H [facility]    ondansetron (ZOFRAN) injection 4 mg  4 mg Intravenous Q6H PRN    senna-docusate sodium (SENOKOT S) 8 6-50 mg per tablet 1 tablet  1 tablet Oral Daily PRN    warfarin (COUMADIN) tablet 2 5 mg  2 5 mg Oral Daily (warfarin)        Allergies   Allergen Reactions    Ramipril Anaphylaxis    Spiriva Handihaler [Tiotropium Bromide Monohydrate] Swelling    Penicillins        OBJECTIVE:  Vitals:   Vitals:    04/14/18 0700   BP: 120/67   Pulse: 66   Resp: 18   Temp: 98 2 °F (36 8 °C)   SpO2: 93%     Body mass index is 36 51 kg/m²  Systolic (93TYW), NJF:665 , Min:103 , LASHELL:803     Diastolic (01SSR), CVJ:33, Min:60, Max:87      Intake/Output Summary (Last 24 hours) at 04/14/18 1104  Last data filed at 04/14/18 0931   Gross per 24 hour   Intake                0 ml   Output              550 ml   Net             -550 ml     Weight (last 2 days)     Date/Time   Weight    04/14/18 0600  84 8 (186 95)    04/13/18 2354  84 8 (186 95)    04/13/18 1913  83 7 (184 56)            Invasive Devices     Peripheral Intravenous Line            Peripheral IV 04/13/18 Right Antecubital less than 1 day          Drain            Urethral Catheter Latex 16 Fr  less than 1 day                PHYSICAL EXAMS:  General:  +forgetful  Patient is not in acute distress, laying in the bed comfortably, awake, alert responding to commands  Head: Normocephalic, Atraumatic  HEENT: White sclera, pink conjunctiva,  PERRLA,pharynx benign  Neck:  +JVD  Supple, carotids+2/+2 no bruits, thyromegaly, adenopathy  Respiratory: clear to P/A  Cardiovascular:  +irregular  PMI normal, S1-S2 normal, No  Murmurs, thrills, gallops, rubs   GI:  Abdomen soft nontender   No hepatosplenomegaly, adenopathy, ascites,or rebound tenderness  Extremities: +2 edema, normal pulses, no calf tenderness, no joint deformities, no venous disease   Integument:  No skin rashes or ulceration  Lymphatic:  No cervical or inguinal lymphadenopathy  Neurologic:  +forgetful   Patient is awake alert, responding to command, well-oriented to time and place and person moving all extremities    LABORATORY RESULTS:    Results from last 7 days  Lab Units 04/13/18 2022   TROPONIN I ng/mL <0 02     CBC with diff:   Results from last 7 days  Lab Units 04/14/18 0516 04/13/18 2022   WBC Thousand/uL 5 71 6 11   HEMOGLOBIN g/dL 10 7* 11 7   HEMATOCRIT % 34 0* 36 6   MCV fL 105* 105*   PLATELETS Thousands/uL 121* 139*   MCH pg 33 0 33 5   MCHC g/dL 31 5 32 0   RDW % 13 2 13 2   MPV fL 12 1 12 1   NRBC AUTO /100 WBCs  --  0       CMP:  Results from last 7 days  Lab Units 04/14/18 0517 04/13/18 2022   SODIUM mmol/L 145 146*   POTASSIUM mmol/L 3 5 3 7   CHLORIDE mmol/L 110* 108   CO2 mmol/L 30 30   ANION GAP mmol/L 5 8   BUN mg/dL 24 25   CREATININE mg/dL 1 54* 1 71*   GLUCOSE RANDOM mg/dL 101 125   CALCIUM mg/dL 9 0 9 2   AST U/L  --  18   ALT U/L  --  20   ALK PHOS U/L  --  57   TOTAL PROTEIN g/dL  --  7 2   BILIRUBIN TOTAL mg/dL  --  0 30   EGFR ml/min/1 73sq m 34 30       BMP:  Results from last 7 days  Lab Units 04/14/18 0517 04/13/18 2022   SODIUM mmol/L 145 146*   POTASSIUM mmol/L 3 5 3 7   CHLORIDE mmol/L 110* 108   CO2 mmol/L 30 30   BUN mg/dL 24 25   CREATININE mg/dL 1 54* 1 71*   GLUCOSE RANDOM mg/dL 101 125   CALCIUM mg/dL 9 0 9 2         Results from last 7 days  Lab Units 04/13/18 2022   NT-PRO BNP pg/mL 5,492*        Results from last 7 days  Lab Units 04/14/18 0517   MAGNESIUM mg/dL 2 0               Results from last 7 days  Lab Units 04/13/18 2022   INR  1 71*       Lipid Profile:   No results found for: CHOL  No results found for: HDL  No results found for: LDLCALC  No results found for: TRIG    Cardiac testing: Results for orders placed during the hospital encounter of 12/10/17   Echo complete with contrast if indicated    Narrative [address] [place] [phone]    Transthoracic Echocardiogram  2D, M-mode, Doppler, and Color Doppler    Study date:  11-Dec-2017    Patient: Genoveva Meigs  MR number: [id number]  Account number: [de-identified]  : 1945  Age: 71 years  Gender: Female  Status: Inpatient  Location: Bedside  Height: 60 in  Weight: 192 lb  BP: 99/ 50 mmHg    Indications: Shortness of breath  Diagnoses: R06 00 - Dyspnea, unspecified, R06 02 - Shortness of breath    Sonographer:  Eileen Polo  Interpreting Physician:  Herve Olivo MD  Referring Physician:  Helio Alan PA-C  Group:  [facility]    SUMMARY    LEFT VENTRICLE:  Systolic function was normal  Ejection fraction was estimated in the range of 55 % to 65 %  There were no regional wall motion abnormalities  There was mild concentric hypertrophy  Doppler parameters were consistent with abnormal left ventricular relaxation (grade 1 diastolic dysfunction)  MITRAL VALVE:  There was mild annular calcification  There was mild regurgitation  AORTIC VALVE:  There was mild regurgitation  TRICUSPID VALVE:  There was mild regurgitation  PULMONIC VALVE:  There was mild regurgitation  HISTORY: PRIOR HISTORY: Elevated trponin, Dementia, Seizures, Stroke, Myocardial infarction  Congestive heart failure  Risk factors: hypertension and hypercholesterolemia  Chronic lung disease  PROCEDURE: The procedure was performed at the bedside  This was a routine study  The transthoracic approach was used  The study included complete 2D imaging, M-mode, complete spectral Doppler, and color Doppler  The heart rate was 68 bpm,  at the start of the study  Images were obtained from the parasternal, apical, subcostal, and suprasternal notch acoustic windows  Echocardiographic views were limited due to poor acoustic window availability, decreased penetration, and  lung interference  This was a technically difficult study  LEFT VENTRICLE: Size was normal  Systolic function was normal  Ejection fraction was estimated in the range of 55 % to 65 %  There were no regional wall motion abnormalities  Wall thickness was normal  There was mild concentric  hypertrophy  DOPPLER: Doppler parameters were consistent with abnormal left ventricular relaxation (grade 1 diastolic dysfunction)  RIGHT VENTRICLE: The size was normal  Systolic function was normal  Wall thickness was normal     LEFT ATRIUM: Size was normal     RIGHT ATRIUM: Size was normal     MITRAL VALVE: There was mild annular calcification  Valve structure was normal  There was normal leaflet separation  DOPPLER: The transmitral velocity was within the normal range  There was no evidence for stenosis  There was mild  regurgitation  AORTIC VALVE: The valve was trileaflet  Leaflets exhibited normal thickness and normal cuspal separation  DOPPLER: Transaortic velocity was within the normal range  There was no evidence for stenosis  There was mild regurgitation  TRICUSPID VALVE: The valve structure was normal  There was normal leaflet separation  DOPPLER: The transtricuspid velocity was within the normal range  There was no evidence for stenosis  There was mild regurgitation  PULMONIC VALVE: Leaflets exhibited normal thickness, no calcification, and normal cuspal separation  DOPPLER: The transpulmonic velocity was within the normal range  There was mild regurgitation  PERICARDIUM: There was no pericardial effusion  The pericardium was normal in appearance  AORTA: The root exhibited normal size      SYSTEM MEASUREMENT TABLES    2D  %FS: 29 2 %  Ao Diam: 3 1 cm  EDV(Teich): 140 8 ml  EF(Teich): 55 6 %  ESV(Teich): 62 5 ml  IVSd: 1 1 cm  LA Area: 20 9 cm2  LA Diam: 3 7 cm  LVEDV MOD A4C: 117 1 ml  LVEF MOD A4C: 50 3 %  LVESV MOD A4C: 58 2 ml  LVIDd: 5 4 cm  LVIDs: 3 8 cm  LVLd A4C: 8 5 cm  LVLs A4C: 7 1 cm  LVPWd: 1 1 cm  RA Area: 17 5 cm2  RVIDd: 3 2 cm  SV MOD A4C: 59 ml  SV(Teich): 78 3 ml    CW  AR Dec [place]: 1 9 m/s2  AR Dec Time: 2208 1 ms  AR PHT: 640 4 ms  AR Vmax: 4 1 m/s  AR maxP 4 mmHg  TR Vmax: 2 5 m/s  TR maxP mmHg    MM  TAPSE: 1 4 cm    PW  AVC: 389 7 ms  E': 0 1 m/s  E/E': 15 3  MV A Anoop: 1 m/s  MV Dec [place]: 3 4 m/s2  MV DecT: 240 3 ms  MV E Anoop: 0 8 m/s  MV E/A Ratio: 0 8  MV PHT: 69 7 ms  MVA By PHT: 3 2 cm2    [facility] Accredited Echocardiography [facility]    Prepared and electronically signed by    Harley Levi MD  Signed 11-Dec-2017 12:09:00       No results found for this or any previous visit  No procedure found  No results found for this or any previous visit  Imaging:   I have personally reviewed pertinent reports  EKG reviewed personally:  A Flutter    Telemetry reviewed personally:   A Flutter, variable 4:1 and 2:1 conduction    Assessment/Plan:  1  Atrial flutter:  -currently rate controlled, HR 60s  Continue Cardizem 60 mg p o  Q 6, Lopressor 50 mg p o  Q 12 for rate control  -continue Coumadin for anticoagulation    2  Acute on chronic diastolic CHF:  -volume overloaded on exam  -BNP 5492  CXR was not done this admission  -ECHO from 2017 shows EF is 55-65%, no regional wall motion abnormalities, grade 1 diastolic dysfunction  -serum Cr 1 71 on arrival, last recorded serum creatinine 1 54  Improved with diuresis  Will increase Lasix to 40 mg IV b i d   -continue Lopressor  -low-salt diet, daily weights, I/O    3   CAD S/P PCI:   -no anginal symptoms at this time  Continue aspirin, statin, beta-blocker  4   Hypertension:  Stable, continue present regimen  5   Hyperlipidemia:  Continue statin  Code Status: Level 3 - DNAR and DNI    Counseling / Coordination of Care  Total floor / unit time spent today 35 minutes  Greater than 50% of total time was spent with the patient and / or family counseling and / or coordination of care  A description of the counseling / coordination of care: Review of history, current assessment, development of a plan      Sharlene Unger PA-C  4/14/2018,11:04 AM

## 2018-04-14 NOTE — ASSESSMENT & PLAN NOTE
· Mild  · Likely due to diuresis  · Repeat BMP tomorrow  · If worsening significantly, we will need to decrease the dose or hold off the dose of Lasix

## 2018-04-14 NOTE — ASSESSMENT & PLAN NOTE
· Patient feels a tightness at the right lower extremity since her last admission here  Thus rule out for DVT  Patient did not have any lower extremity duplex scan that was done on that admission  · Patient's bilateral leg edema, may be due to patient's history of congestive heart failure, but with symptoms on the right lower extremity with tightness and pain, we have to rule out possibility lower extremity DVT  If patient is positive for lower extremity DVT, with new onset atrial fibrillation just diagnosed a few weeks ago, and recurrent RVR we have to rule out possibility of PE, thus patient may need imaging study for this (possible V/Q scan as patient has chronic kidney disease)

## 2018-04-14 NOTE — PLAN OF CARE
CARDIOVASCULAR - ADULT     Maintains optimal cardiac output and hemodynamic stability Progressing     Absence of cardiac dysrhythmias or at baseline rhythm Progressing        GENITOURINARY - ADULT     Maintains or returns to baseline urinary function Progressing     Absence of urinary retention Progressing     Urinary catheter remains patent Progressing        METABOLIC, FLUID AND ELECTROLYTES - ADULT     Electrolytes maintained within normal limits Progressing     Fluid balance maintained Progressing        MUSCULOSKELETAL - ADULT     Maintain or return mobility to safest level of function Progressing     Maintain proper alignment of affected body part Progressing        NEUROSENSORY - ADULT     Achieves stable or improved neurological status Progressing     Absence of seizures Progressing     Remains free of injury related to seizures activity Progressing     Achieves maximal functionality and self care Progressing        Potential for Falls     Patient will remain free of falls Progressing        Prexisting or High Potential for Compromised Skin Integrity     Skin integrity is maintained or improved Progressing        RESPIRATORY - ADULT     Achieves optimal ventilation and oxygenation Progressing        SKIN/TISSUE INTEGRITY - ADULT     Skin integrity remains intact Progressing     Incision(s), wounds(s) or drain site(s) healing without S/S of infection Progressing     Oral mucous membranes remain intact Progressing

## 2018-04-14 NOTE — H&P
H and P notes Kelsey Juan [birth year], 71 y o  female MRN: [id number]     Unit/Bed#: [facility] Encounter: [id number]     Primary Care Provider: Arnoldo East DO   Date and time admitted to hospital: 4/13/2018  7:08 PM               Atrial flutter [facility])   Assessment & Plan     · Patient initially had atrial flutter/atrial fibrillation with rapid ventricle response in the emergency room  Rapid ventricle response had resolved with Cardizem  Patient was just diagnosed with atrial flutter/atrial fibrillation last month here at [address] [place] [address]  · Continue with antiarrhythmic medications  Continue with Coumadin  · Cardiology consult  · Patient's cardiologist in [place] is recommending direct oral anticoagulant instead of Coumadin  I will leave this decision to our inpatient cardiologist here at [address] [place] [address]  · Questionable role of cardioversion; ablation  We will ask Cardiology about this  This was also mentioned to the patient by her outpatient cardiologist   · No echocardiogram done on the last admission  We will ask Cardiology also about this, if patient needs an echocardiogram on this admission  · TSH was normal last March 22nd of this year  · Patient's rapid ventricle response may also be due to the fact that patient is on a lot of COPD medications/inhalers  Patient is on 2 long-acting beta agonists and inhaled corticosteroid; patient is also both on albuterol nebulization as well as DuoNeb  According to the patient's daughter, she also mentioned this to the staff at the rehab facility  Thus, we will only use 1 long-acting beta agonists and ICS and will just use albuterol nebulization and not the DuoNeb  · I will increase the dose of patient's Coumadin as patient's INR is still subtherapeutic  According to the patient's daughter, patient took all of her medications today, including the Coumadin    Thus next dose will be tomorrow             Stage 3 chronic kidney disease   Assessment & Plan     · Review of patient's BMPs, revealed that the last 1 was 1 48   · Presently, it is 1 7  · Monitor patient's kidney function  · Avoid nephrotoxins  · Avoid hypotension  · If kidney function/creatinine worsens significantly, we will need to do further workup and management; may need to cut back on patient's Lasix          CHF (congestive heart failure) ([facility])   Assessment & Plan     · Chronic diastolic congestive heart failure, presently compensated  · Continue cardiovascular and heart failure medications  · If kidney function worsens significantly, we may need to cut back or hold off on patient's Lasix dose  · Cardiology consult           Bilateral leg edema   Assessment & Plan     · Patient feels a tightness at the right lower extremity since her last admission here  Thus rule out for DVT  Patient did not have any lower extremity duplex scan that was done on that admission  · Patient's bilateral leg edema, may be due to patient's history of congestive heart failure, but with symptoms on the right lower extremity with tightness and pain, we have to rule out possibility lower extremity DVT  If patient is positive for lower extremity DVT, with new onset atrial fibrillation just diagnosed a few weeks ago, and recurrent RVR we have to rule out possibility of PE, thus patient may need imaging study for this (possible V/Q scan as patient has chronic kidney disease)          Hypernatremia   Assessment & Plan     · Mild  · Likely due to diuresis  · Repeat BMP tomorrow  · If worsening significantly, we will need to decrease the dose or hold off the dose of Lasix           Dementia   Assessment & Plan     · Patient has short-term memory loss  · Supportive care           COPD (chronic obstructive pulmonary disease) ([facility])   Assessment & Plan     · No exacerbation  · Continue with only 1 long-acting beta agonist and ICS (according to the medication list, patient is on 2 different long-acting inhalers)    I will only continue with the albuterol inhaler on as needed basis and will discontinue with the DuoNeb  This is due to patient's on and off rapid ventricle response  · Oxygen p r n                 VTE Prophylaxis: Warfarin (Coumadin)  / reason for no mechanical VTE prophylaxis Rule out DVT with bilateral lower extremity swelling   Code Status:  DNR DNI  POLST: POLST form is not discussed and not completed at this time  Discussion with family:  I spoke to the patient's daughter at bedside  I got patient's HPI from her also  I discussed our findings and plans  I answered all questions and concerns      Anticipated Length of Stay:  Patient will be admitted on an Inpatient basis with an anticipated length of stay of  greater than 2 midnights  Justification for Hospital Stay:  Due to the above findings and plans      Total Time for Visit, including Counseling / Coordination of Care: 1 hour  Greater than 50% of this total time spent on direct patient counseling and coordination of care      Chief Complaint:  Was sent here from the cardiologist's office due to rapid heart rate      History of Present Illness:     Quinn Edwards is a 71 y o  female who presents with rapid heart rate to the emergency room at [facility] [place]  Patient has history of dementia and has short-term memory loss, thus patient's HPI was from the patient's daughter at bedside  According to the patient's daughter, her mother was just here a few weeks ago, and was diagnosed with a her respiratory tract infection and a new onset heart rhythm abnormality  From a review of patient's previous records, patient had a new onset atrial flutter at that time  Patient was discharged on Coumadin and prescribed Cardizem and metoprolol  According to the patient's daughter, patient was very physically deconditioned here as she spent several days here was even transferred to the ICU, thus, patient was eventually discharge to a rehabilitation facility    Earlier today, from the rehabilitation facility, patient was brought to her cardiologist up in [place]  One in the office, her EKG revealed significant rapid heart rate at 140-150 per minute  Thus, patient's cardiologist advised the patient to go to the nearest emergency room  However, since patient lives here in [facility], they opted to come back here  According to the patient's daughter, patient's cardiologist told her, that patient's Coumadin may need to be changed to a new blood thinner, and that patient may eventually need a procedure on her heart to control the heart rate/heart rhythm  On questioning of the patient, patient denied any chest pains, but admitted to having some shortness of breath awhile ago when her heart rate was fast   According the patient's daughter, patient also had some nausea episodes earlier today but no vomiting episodes  Patient denies any chest pains or any abdominal pains  Patient admits to some mild headache right now while here  Patient also admits to right lower extremity tightness which she had been feeling since she was admitted here  Patient's daughter is concerned about a blood clot in the leg, which she was also told by patient's outpatient cardiologist   Patient still has some occasional cough but not as bad as before  Patient denies any other symptoms other the ones mentioned above  In the emergency room, patient was found to have atrial flutter/atrial fibrillation with rapid ventricular response at 130 per minute  Patient was given doses of IV beta blocker and eventually IV Cardizem, which made patient's heart rate normal         Review of Systems:     Review of Systems      Ten point review systems done and they were negative except for the ones I mentioned in my HPI  Patient denies any fever or chills  Patient denies any dizziness or any loss of consciousness    Patient denies any bowel wound problems or any urinary problems         Past Medical and Surgical History:      Medical History        Past Medical History:   Diagnosis Date    Brain aneurysm      CAD (coronary artery disease)      Cardiac disease      [facility]est[facility]      CKD (chronic kidney disease)      COPD (chronic obstructive pulmonary disease) (HCC)      Dementia      Dementia      Disease of thyroid gland      Hyperlipidemia      Hyperlipidemia      Hypertension      MI [facility]myocardial infarction) ([facility] [address] )      Migraine      Renal disorder      Seizures (HCC)      Stroke [facility])              Surgical History   Past Surgical History:   Procedure Laterality Date    APPENDECTOMY        BLADDER TUMOR EXCISION        BRAIN SURGERY        CARDIAC SURGERY        CORONARY STENT PLACEMENT         5 stents    EYE SURGERY        MANDIBLE FRACTURE SURGERY        TONSILLECTOMY        TUBAL LIGATION        UTERINE FIBROID SURGERY                Meds/Allergies:             Prior to Admission medications    Medication Sig Start Date End Date Taking?  Authorizing Provider   atorvastatin (LIPITOR) 10 mg tablet Take 10 mg by mouth daily     Yes Historical Provider, MD   fluticasone furoate-vilanterol (BREO ELLIPTA) Inhale 1 puff daily     Yes Historical Provider, MD   acetaminophen (TYLENOL) 500 mg tablet Take 500 mg by mouth every 6 (six) hours as needed for mild pain       Historical Provider, MD   albuterol (2 5 mg/3 mL) 0 083 % nebulizer solution Take 2 5 mg by nebulization every 6 (six) hours as needed for wheezing       Historical Provider, MD   albuterol (PROVENTIL HFA,VENTOLIN HFA) 90 mcg/act inhaler Inhale 2 puffs every 6 (six) hours as needed for wheezing       Historical Provider, MD   aspirin (ECOTRIN LOW STRENGTH) 81 mg EC tablet Take 81 mg by mouth daily       Historical Provider, MD   budesonide-formoterol (SYMBICORT) 160-4 5 mcg/act inhaler Inhale 2 puffs 2 (two) times a day       Historical Provider, MD   buPROPion (WELLBUTRIN SR) 150 mg 12 hr tablet Take 150 mg by mouth daily       Historical Provider, MD   carBAMazepine (TEGretol) 100 mg chewable tablet Chew 100 mg 3 (three) times a day       Historical Provider, MD   Cholecalciferol (VITAMIN D) 2000 units CAPS Take 1 capsule by mouth daily         Historical Provider, MD   diltiazem (CARDIZEM) 60 mg tablet Take 1 tablet (60 mg total) by mouth every 6 (six) hours 3/30/18     TANVI Schafer   divalproex sodium (DEPAKOTE ER) 250 mg 24 hr tablet Take 500 mg by mouth daily       Historical Provider, MD   fenofibrate micronized (LOFIBRA) 67 MG capsule Take 67 mg by mouth every morning before breakfast       Historical Provider, MD   ferrous sulfate 325 (65 Fe) mg tablet Take 325 mg by mouth 2 (two) times a day       Historical Provider, MD   furosemide (LASIX) 40 mg tablet Take 1 tablet (40 mg total) by mouth daily 3/30/18     TANVI Schafer   ipratropium-albuterol (DUO-NEB) 0 5-2 5 mg/mL Take 3 mL by nebulization every 6 (six) hours       Historical Provider, MD   Levothyroxine Sodium 88 MCG CAPS Take 88 mcg by mouth every morning         Historical Provider, MD   metoprolol tartrate (LOPRESSOR) 50 mg tablet Take 1 tablet (50 mg total) by mouth every 12 (twelve) hours 3/30/18     TANVI Schafer   Sennosides-Docusate Sodium (SENNA PLUS PO) Take by mouth daily as needed       Historical Provider, MD   warfarin (COUMADIN) 2 mg tablet Take 1 tablet (2 mg total) by mouth daily Goal INR 2 0-3 0 3/30/18     TANVI Schafer   simvastatin (ZOCOR) 20 mg tablet Take 20 mg by mouth daily at bedtime   4/13/18   Historical Provider, MD      I reviewed patient's medication list   It is important to note, that I discussed patient's medication list with patient's daughter as there is redundancy in patient's inhalers  Patient is on 2 long-acting beta agonist and ICS  Patient is also on albuterol inhaler/nebulization as well as DuoNeb nebulization      Allergies:         Allergies   Allergen Reactions    Ramipril Anaphylaxis    Spiriva Handihaler [Tiotropium Bromide Monohydrate] Swelling    Penicillins           Social History:     Marital Status:           History   Alcohol Use No      History   Smoking Status    Former Smoker   Smokeless Tobacco    Never Used          History   Drug Use No         Family History:     History reviewed  No pertinent family history      Physical Exam:      Vitals:   Blood Pressure: 112/70 (04/13/18 2230)  Pulse: 72 (04/13/18 2230)  Temperature: 98 °F (36 7 °C) (04/13/18 1913)  Temp Source: Oral (04/13/18 1913)  Respirations: 20 (04/13/18 2230)  Height: 5' (152 4 cm) (04/13/18 1913)  Weight - Scale: 83 7 kg (184 lb 9 oz) (04/13/18 1913)  SpO2: 93 % (04/13/18 2230)     Physical Exam   Constitutional: No distress  HENT:   Head: Normocephalic and atraumatic  Mouth/Throat: Oropharynx is clear and moist  No oropharyngeal exudate  Eyes: Right eye exhibits no discharge  Left eye exhibits no discharge  No scleral icterus  Neck: No JVD present  No tracheal deviation present  Cardiovascular: Normal rate  Exam reveals no gallop and no friction rub  No murmur heard  Irregularly irregular heart rhythm  Pulmonary/Chest: Effort normal and breath sounds normal  No stridor  No respiratory distress  She has no wheezes  She has no rales  Abdominal: Soft  Bowel sounds are normal  She exhibits no distension  There is no tenderness  There is no rebound and no guarding  Musculoskeletal: She exhibits edema  She exhibits no tenderness or deformity  Positive for bilateral lower extremity edema, approximately grade 1-2   Neurological: She is alert  No cranial nerve deficit  Patient answers simple questions appropriately  However, patient has short-term memory loss  Skin: Skin is warm  No rash noted  She is not diaphoretic  No erythema  No pallor  Psychiatric: She has a normal mood and affect   Her behavior is normal    Vitals reviewed               Additional Data:      Lab Results: I have personally reviewed pertinent reports           Results from last 7 days  Lab Units 04/13/18 2022   WBC Thousand/uL 6 11   HEMOGLOBIN g/dL 11 7   HEMATOCRIT % 36 6   PLATELETS Thousands/uL 139*   NEUTROS PCT % 42*   LYMPHS PCT % 41   MONOS PCT % 9   EOS PCT % 7*         Results from last 7 days  Lab Units 04/13/18 2022   SODIUM mmol/L 146*   POTASSIUM mmol/L 3 7   CHLORIDE mmol/L 108   CO2 mmol/L 30   BUN mg/dL 25   CREATININE mg/dL 1 71*   CALCIUM mg/dL 9 2   TOTAL PROTEIN g/dL 7 2   BILIRUBIN TOTAL mg/dL 0 30   ALK PHOS U/L 57   ALT U/L 20   AST U/L 18   GLUCOSE RANDOM mg/dL 125         Results from last 7 days  Lab Units 04/13/18 2022   INR   1 71*         Imaging: I have personally reviewed pertinent reports        No orders to display         EKG, Pathology, and Other Studies Reviewed on Admission:   · EKG:  Atrial fibrillation/atrial flutter, with rapid ventricular response at 126 per minute  No significant change with previous EKG dated March 25, 2018  Presently, on telemetry, patient's heart rhythm is atrial flutter with normal ventricular response      Allscripts / [facility] Records Reviewed: Yes      ** Please Note: This note has been constructed using a voice recognition system   **

## 2018-04-14 NOTE — ASSESSMENT & PLAN NOTE
· Review of patient's BMPs, revealed that the last 1 was 1 48   · Presently, it is 1 7  · Monitor patient's kidney function  · Avoid nephrotoxins  · Avoid hypotension    · If kidney function/creatinine worsens significantly, we will need to do further workup and management; may need to cut back on patient's Lasix

## 2018-04-14 NOTE — ED PROVIDER NOTES
History  Chief Complaint   Patient presents with    Irregular Heart Beat     Patient states she is coming from her cardiologist office, where her cardiologist told her to come for furthur evaluation of A-fib  Patient states she was here in our hospital on the 22nd March with same problem  Emily Oliveira is a 71 y o  female w PMH COPD, CKD, CHF, CAD, AF on coumadin, HTN, HLD, stroke  who presents for evaluation of AF  Patient referred here by cardiologist for AFib with RVR  She was at cardiologist office today, seen for generalized weakness and has follow-up after recent discharge from hospital   There she was noted to have heart rate of almost 150  She was noted to have atrial fibrillation when recently here, admitted in the ICU  She has been on metoprolol  Reports compliance with Coumadin  Cardiologist note refers switching to noac from coumadin  She has no chest pain, no palpitations, sob or trouble breathing  She has also mentioned RLE pain ongoing while at rehab only w ambulation  Prior to Admission Medications   Prescriptions Last Dose Informant Patient Reported? Taking?    Cholecalciferol (VITAMIN D) 2000 units CAPS   Yes No   Sig: Take 1 capsule by mouth daily     Levothyroxine Sodium 88 MCG CAPS   Yes No   Sig: Take 88 mcg by mouth every morning     Sennosides-Docusate Sodium (SENNA PLUS PO)   Yes No   Sig: Take by mouth daily as needed   acetaminophen (TYLENOL) 500 mg tablet   Yes No   Sig: Take 500 mg by mouth every 6 (six) hours as needed for mild pain   albuterol (2 5 mg/3 mL) 0 083 % nebulizer solution   Yes No   Sig: Take 2 5 mg by nebulization every 6 (six) hours as needed for wheezing   albuterol (PROVENTIL HFA,VENTOLIN HFA) 90 mcg/act inhaler   Yes No   Sig: Inhale 2 puffs every 6 (six) hours as needed for wheezing   aspirin (ECOTRIN LOW STRENGTH) 81 mg EC tablet   Yes No   Sig: Take 81 mg by mouth daily   atorvastatin (LIPITOR) 10 mg tablet   Yes Yes   Sig: Take 10 mg by mouth daily   buPROPion (WELLBUTRIN SR) 150 mg 12 hr tablet   Yes No   Sig: Take 150 mg by mouth daily   budesonide-formoterol (SYMBICORT) 160-4 5 mcg/act inhaler   Yes No   Sig: Inhale 2 puffs 2 (two) times a day   carBAMazepine (TEGretol) 100 mg chewable tablet   Yes No   Sig: Chew 100 mg 3 (three) times a day   diltiazem (CARDIZEM) 60 mg tablet   No No   Sig: Take 1 tablet (60 mg total) by mouth every 6 (six) hours   divalproex sodium (DEPAKOTE ER) 250 mg 24 hr tablet   Yes No   Sig: Take 500 mg by mouth daily   fenofibrate micronized (LOFIBRA) 67 MG capsule   Yes No   Sig: Take 67 mg by mouth every morning before breakfast   ferrous sulfate 325 (65 Fe) mg tablet   Yes No   Sig: Take 325 mg by mouth 2 (two) times a day   fluticasone furoate-vilanterol (BREO ELLIPTA)   Yes Yes   Sig: Inhale 1 puff daily   furosemide (LASIX) 40 mg tablet   No No   Sig: Take 1 tablet (40 mg total) by mouth daily   ipratropium-albuterol (DUO-NEB) 0 5-2 5 mg/mL   Yes No   Sig: Take 3 mL by nebulization every 6 (six) hours   metoprolol tartrate (LOPRESSOR) 50 mg tablet   No No   Sig: Take 1 tablet (50 mg total) by mouth every 12 (twelve) hours   warfarin (COUMADIN) 2 mg tablet   No No   Sig: Take 1 tablet (2 mg total) by mouth daily Goal INR 2 0-3 0      Facility-Administered Medications: None       Past Medical History:   Diagnosis Date    Brain aneurysm     CAD (coronary artery disease)     Cardiac disease     CHF (congestive heart failure) (HCC)     CKD (chronic kidney disease)     [facility]     Dementia     Dementia     Disease of thyroid gland     Hyperlipidemia     Hyperlipidemia     Hypertension     [facility] [address] [facility]     Migraine     Renal disorder     [email or web address]     Stroke ([email or web address]        Past Surgical History:   Procedure Laterality Date    APPENDECTOMY      BLADDER TUMOR EXCISION      BRAIN SURGERY      CARDIAC SURGERY      CORONARY STENT PLACEMENT 5 stents    EYE SURGERY      MANDIBLE FRACTURE SURGERY      TONSILLECTOMY      TUBAL LIGATION      UTERINE FIBROID SURGERY         History reviewed  No pertinent family history  I have reviewed and agree with the history as documented  Social History   Substance Use Topics    Smoking status: Former Smoker    Smokeless tobacco: Never Used    Alcohol use No        Review of Systems   Constitutional: Negative for chills, diaphoresis and fever  HENT: Negative for congestion and sore throat  Eyes: Negative for visual disturbance  Respiratory: Negative for cough, chest tightness, shortness of breath and wheezing  Cardiovascular: Negative for chest pain and leg swelling  Gastrointestinal: Negative for abdominal pain, constipation, diarrhea, nausea and vomiting  Genitourinary: Negative for difficulty urinating, dysuria, frequency, hematuria, urgency, vaginal bleeding, vaginal discharge and vaginal pain  Musculoskeletal: Negative for arthralgias and myalgias  Neurological: Positive for weakness  Negative for dizziness, light-headedness, numbness and headaches  Psychiatric/Behavioral: The patient is not nervous/anxious  Physical Exam  ED Triage Vitals [04/13/18 1913]   Temperature Pulse Respirations Blood Pressure SpO2   98 °F (36 7 °C) (!) 131 20 116/77 92 %      Temp Source Heart Rate Source Patient Position - Orthostatic VS BP Location FiO2 (%)   Oral Monitor Sitting Right arm --      Pain Score       --           Orthostatic Vital Signs  Vitals:    04/13/18 1913 04/13/18 2030 04/13/18 2130 04/13/18 2230   BP: 116/77 116/87 120/77 112/70   Pulse: (!) 131 (!) 133 (!) 130 72   Patient Position - Orthostatic VS: Sitting Sitting Sitting Sitting       Physical Exam   Constitutional: She is oriented to person, place, and time  She appears well-developed and well-nourished  No distress  HENT:   Head: Normocephalic and atraumatic  Eyes: Pupils are equal, round, and reactive to light  Neck: Neck supple  No tracheal deviation present  Cardiovascular: Normal rate, regular rhythm and intact distal pulses  Exam reveals no gallop and no friction rub  No murmur heard  IRIR   tachycardia   Pulmonary/Chest: Effort normal and breath sounds normal  No respiratory distress  She has no wheezes  She has no rales  Abdominal: Soft  Bowel sounds are normal  She exhibits no distension and no mass  There is no tenderness  There is no guarding  Musculoskeletal: She exhibits no edema or deformity  Neurological: She is alert and oriented to person, place, and time  Skin: Skin is warm and dry  She is not diaphoretic  No obvious edema of the right lower extremity in comparison to the left  Normal pulses and sensation  Normal strength  Has no calf diameter discrepancy  No erythema  No pain to the deep vasculature here  Psychiatric: She has a normal mood and affect  Her behavior is normal    Nursing note and vitals reviewed        ED Medications  Medications   metoprolol (LOPRESSOR) injection 5 mg (5 mg Intravenous Given 4/13/18 2128)   diltiazem (CARDIZEM) injection 10 mg (10 mg Intravenous Given 4/13/18 2201)   diltiazem (CARDIZEM) tablet 30 mg (30 mg Oral Given 4/13/18 2246)       Diagnostic Studies  Results Reviewed     Procedure Component Value Units Date/Time    BNP [[id number]]  (Abnormal) Collected:  04/13/18 2022    Lab Status:  Final result Specimen:  Blood from Arm, Right Updated:  04/13/18 2058     NT-proBNP 5,492 (H) pg/mL     Troponin I [[id number]]  (Normal) Collected:  04/13/18 2022    Lab Status:  Final result Specimen:  Blood from Arm, Right Updated:  04/13/18 2055     Troponin I <0 02 ng/mL     Narrative:         [employer] Chemistry analyzer 99% cutoff is > 0 04 ng/mL in network labs    o cTnI 99% cutoff is useful only when applied to patients in the clinical setting of myocardial ischemia  o cTnI 99% cutoff should be interpreted in the context of clinical history, ECG findings and possibly cardiac imaging to establish correct diagnosis  o cTnI 99% cutoff may be suggestive but clearly not indicative of a coronary event without the clinical setting of myocardial ischemia  Comprehensive metabolic panel [[id number]]  (Abnormal) Collected:  04/13/18 2022    Lab Status:  Final result Specimen:  Blood from Arm, Right Updated:  04/13/18 2052     Sodium 146 (H) mmol/L      Potassium 3 7 mmol/L      Chloride 108 mmol/L      CO2 30 mmol/L      Anion Gap 8 mmol/L      BUN 25 mg/dL      Creatinine 1 71 (H) mg/dL      Glucose 125 mg/dL      Calcium 9 2 mg/dL      AST 18 U/L      ALT 20 U/L      Alkaline Phosphatase 57 U/L      Total Protein 7 2 g/dL      Albumin 3 4 (L) g/dL      Total Bilirubin 0 30 mg/dL      eGFR 30 ml/min/1 73sq m     Narrative:         National Kidney Disease Education Program recommendations are as follows:  GFR calculation is accurate only with a steady state creatinine  Chronic Kidney disease less than 60 ml/min/1 73 sq  meters  Kidney failure less than 15 ml/min/1 73 sq  meters      Protime-INR [[id number]]  (Abnormal) Collected:  04/13/18 2022    Lab Status:  Final result Specimen:  Blood from Arm, Right Updated:  04/13/18 2049     Protime 20 5 (H) seconds      INR 1 71 (H)    APTT [[id number]]  (Normal) Collected:  04/13/18 2022    Lab Status:  Final result Specimen:  Blood from Arm, Right Updated:  04/13/18 2049     PTT 32 seconds     CBC and differential [[id number]]  (Abnormal) Collected:  04/13/18 2022    Lab Status:  Final result Specimen:  Blood from Arm, Right Updated:  04/13/18 2034     WBC 6 11 Thousand/uL      RBC 3 49 (L) Million/uL      Hemoglobin 11 7 g/dL      Hematocrit 36 6 %       (H) fL      MCH 33 5 pg      MCHC 32 0 g/dL      RDW 13 2 %      MPV 12 1 fL      Platelets 330 (L) Thousands/uL      nRBC 0 /100 WBCs      Neutrophils Relative 42 (L) %      Lymphocytes Relative 41 %      Monocytes Relative 9 %      Eosinophils Relative 7 (H) %      Basophils Relative 1 %      Neutrophils Absolute 2 58 Thousands/µL      Lymphocytes Absolute 2 49 Thousands/µL      Monocytes Absolute 0 56 Thousand/µL      Eosinophils Absolute 0 41 Thousand/µL      Basophils Absolute 0 04 Thousands/µL                  No orders to display              Procedures  Procedures       Phone Contacts  ED Phone Contact    ED Course  ED Course as of Apr 14 0001 Fri Apr 13, 2018 2126 EKG Interpretation    Rate: 126 BPM  Rhythm: AF w RVR   Axis: normal  Intervals: Normal, no blocks, QTc 596ms  Q waves: aVF  T waves: limited evaluation given tachycardia   ST segments: trace elevation    Impression: abnormal EKG  EKG for comparison: no significant change 3/25/18  EKG interpreted by me  2207 Pt had no improvement w metoprolol  Her HR immediately responded to cardizem  MDM  Number of Diagnoses or Management Options  Atrial flutter [facility]):   Diagnosis management comments: DDX includes but not ltd to:   Patient here with atrial fibrillation with RVR  She was given metoprolol with no improvement in symptoms  Subsequently given Cardizem which did considerably help her blood pressure so oral Cardizem was initiated  She had cardiac workup with EKG, troponin  Doubt ACS given no chest pain  Doubt infection  She has also mentioned some pain in the right lower extremity which she would benefit from getting DVT ultrasound given she was recently hospitalized  Will admit for telemetry, cards eval, AV jessenia blockers  Portions of the record may have been created with voice recognition software   Occasional wrong word or "sound a like" substitutions may have occurred due to the inherent limitations of voice recognition software   Read the chart carefully and recognize, using context, where substitutions have occurred        CritCare Time    Disposition  Final diagnoses:   Atrial flutter ([address] )     Time reflects when diagnosis was documented in both MDM as applicable and the Disposition within this note     Time User Action Codes Description Comment    4/13/2018 10:47 PM Val Patel Add [I48 92] Atrial flutter ([facility] [address] )     4/13/2018 11:33 PM KeonNaldono B Modify [I48 92] [facility] flutter ([facility] )     4/13/2018 11:33 PM Keon, Alpiniano B Add [I50 9] CHF (congestive heart failure) ([facility] )     4/13/2018 11:33 PM Keon, Alpiniano B Modify [I50 9] CHF (congestive heart failure) ([facility] )     4/13/2018 11:33 PM Keon, Alpiniano B Modify [I50 9] CHF (congestive heart failure) [facility])       ED Disposition     ED Disposition Condition Comment    Admit  Case was discussed with Keon and the patient's admission status was agreed to be Admission Status: inpatient status to the service of Dr Meryle Cartwright           Follow-up Information    None       Current Discharge Medication List      CONTINUE these medications which have NOT CHANGED    Details   atorvastatin (LIPITOR) 10 mg tablet Take 10 mg by mouth daily      fluticasone furoate-vilanterol (BREO ELLIPTA) Inhale 1 puff daily      acetaminophen (TYLENOL) 500 mg tablet Take 500 mg by mouth every 6 (six) hours as needed for mild pain      albuterol (2 5 mg/3 mL) 0 083 % nebulizer solution Take 2 5 mg by nebulization every 6 (six) hours as needed for wheezing      albuterol (PROVENTIL HFA,VENTOLIN HFA) 90 mcg/act inhaler Inhale 2 puffs every 6 (six) hours as needed for wheezing      aspirin (ECOTRIN LOW STRENGTH) 81 mg EC tablet Take 81 mg by mouth daily      budesonide-formoterol (SYMBICORT) 160-4 5 mcg/act inhaler Inhale 2 puffs 2 (two) times a day      buPROPion (WELLBUTRIN SR) 150 mg 12 hr tablet Take 150 mg by mouth daily      carBAMazepine (TEGretol) 100 mg chewable tablet Chew 100 mg 3 (three) times a day      Cholecalciferol (VITAMIN D) 2000 units CAPS Take 1 capsule by mouth daily        diltiazem (CARDIZEM) 60 mg tablet Take 1 tablet (60 mg total) by mouth every 6 (six) hours  Refills: 0    Associated Diagnoses: New onset atrial flutter (HCC) divalproex sodium (DEPAKOTE ER) 250 mg 24 hr tablet Take 500 mg by mouth daily      fenofibrate micronized (LOFIBRA) 67 MG capsule Take 67 mg by mouth every morning before breakfast      ferrous sulfate 325 (65 Fe) mg tablet Take 325 mg by mouth 2 (two) times a day      furosemide (LASIX) 40 mg tablet Take 1 tablet (40 mg total) by mouth daily  Refills: 0    Associated Diagnoses: CHF (congestive heart failure) (HCC)      ipratropium-albuterol (DUO-NEB) 0 5-2 5 mg/mL Take 3 mL by nebulization every 6 (six) hours      Levothyroxine Sodium 88 MCG CAPS Take 88 mcg by mouth every morning        metoprolol tartrate (LOPRESSOR) 50 mg tablet Take 1 tablet (50 mg total) by mouth every 12 (twelve) hours  Refills: 0    Associated Diagnoses: New onset atrial flutter (HCC)      Sennosides-Docusate Sodium (SENNA PLUS PO) Take by mouth daily as needed      warfarin (COUMADIN) 2 mg tablet Take 1 tablet (2 mg total) by mouth daily Goal INR 2 0-3 0  Refills: 0    Associated Diagnoses: New onset atrial flutter (HCC)           No discharge procedures on file      ED Provider  Electronically Signed by           Eli August PA-C  04/14/18 0001

## 2018-04-14 NOTE — RESPIRATORY THERAPY NOTE
RT Protocol Note  Jason Bejarano 71 y o  female MRN: [id number]  Unit/Bed#: -01 Encounter: [id number]    Assessment    Principal Problem:    Atrial flutter ([facility] )  Active Problems:    COPD (chronic obstructive pulmonary disease) ([facility])    CHF (congestive heart failure) ([facility])    Dementia    Hypernatremia    Stage 3 chronic kidney disease    Bilateral leg edema      Home Pulmonary Medications:  Albuterol nebulizer and MDIs PRN, Symbicort 160-4 5, Breo Ellipta       Past Medical History:   Diagnosis Date    Brain aneurysm     CAD (coronary artery disease)     Cardiac disease     CHF (congestive heart failure) ([facility])     CKD (chronic kidney disease)     COPD (chronic obstructive pulmonary disease) ([facility])     Dementia     Dementia     Disease of thyroid gland     Hyperlipidemia     Hyperlipidemia     Hypertension     MI (myocardial infarction) ([place] [address] )     Migraine     Renal disorder     Seizures ([place] [address] )     Stroke ([place] [address] )      Social History     Social History    Marital status:      Spouse name: N/A    Number of children: N/A    Years of education: N/A     Social History Main Topics    Smoking status: Former Smoker    Smokeless tobacco: Never Used    Alcohol use No    Drug use: No    Sexual activity: Not Asked     Other Topics Concern    None     Social History Narrative    None       Subjective         Objective    Physical Exam:   Assessment Type: Assess only  General Appearance: Alert, Awake  Respiratory Pattern: Normal  Chest Assessment: Chest expansion symmetrical  Bilateral Breath Sounds: Clear    Vitals:  Blood pressure 134/60, pulse 90, temperature 98 2 °F (36 8 °C), temperature source Oral, resp  rate 16, height 5' (1 524 m), weight 84 8 kg (186 lb 15 2 oz), SpO2 91 %  Imaging and other studies: I have personally reviewed pertinent reports  Plan    Respiratory Plan: No distress/Pulmonary history        Resp Comments: Patient visited for respiratory protocol  Pt  awake and alert and in no distress  SpO2 91, HR 90 on Room air  BS are clear  Agree with previously ordered MDIs, will change Albuterol nebs to Xopenex and NSS due to Pt's cardiac issues   Will follow

## 2018-04-14 NOTE — PROGRESS NOTES
Progress Note Von Velasquez [birth year], 71 y o  female MRN: [id number]    Unit/Bed#: -01 Encounter: [id number]    Primary Care Provider: Adilia Marie DO   Date and time admitted to hospital: 4/13/2018  7:08 PM        * Atrial flutter [facility])   Assessment & Plan    · Patient initially had atrial flutter/atrial fibrillation with rapid ventricle response in the emergency room  Rapid ventricle response had resolved with Cardizem  Patient was just diagnosed with atrial flutter/atrial fibrillation last month here at [facility]  · Continue with antiarrhythmic medications  Continue with Coumadin  · Cardiology consult  · Patient's cardiologist in [place] is recommending direct oral anticoagulant instead of Coumadin  I will leave this decision to our inpatient cardiologist here at [facility]  · Questionable role of cardioversion; ablation  We will ask Cardiology about this  This was also mentioned to the patient by her outpatient cardiologist   · No echocardiogram done on the last admission  We will ask Cardiology also about this, if patient needs an echocardiogram on this admission  · TSH was normal last March 22nd of this year  · Patient's rapid ventricle response may also be due to the fact that patient is on a lot of COPD medications/inhalers  Patient is on 2 long-acting beta agonists and inhaled corticosteroid; patient is also both on albuterol nebulization as well as DuoNeb  According to the patient's daughter, she also mentioned this to the staff at the rehab facility  Thus, we will only use 1 long-acting beta agonists and ICS and will just use albuterol nebulization and not the DuoNeb  · I will increase the dose of patient's Coumadin as patient's INR is still subtherapeutic  According to the patient's daughter, patient took all of her medications today, including the Coumadin  Thus next dose will be tomorrow            Stage 3 chronic kidney disease   Assessment & Plan    · Review of patient's BMPs, revealed that the last 1 was 1 48   · Presently, it is 1 7  · Monitor patient's kidney function  · Avoid nephrotoxins  · Avoid hypotension  · If kidney function/creatinine worsens significantly, we will need to do further workup and management; may need to cut back on patient's Lasix        Hypernatremia   Assessment & Plan    · Mild  · Likely due to diuresis  · Repeat BMP tomorrow  · If worsening significantly, we will need to decrease the dose or hold off the dose of Lasix  CHF (congestive heart failure) ([facility])   Assessment & Plan    · Chronic diastolic congestive heart failure, presently compensated  · Continue cardiovascular and heart failure medications  · If kidney function worsens significantly, we may need to cut back or hold off on patient's Lasix dose  · Cardiology consult  COPD (chronic obstructive pulmonary disease) ([facility])   Assessment & Plan    · No exacerbation  · Continue with only 1 long-acting beta agonist and ICS (according to the medication list, patient is on 2 different long-acting inhalers)  I will only continue with the albuterol inhaler on as needed basis and will discontinue with the DuoNeb  This is due to patient's on and off rapid ventricle response  · Oxygen p r n  Bilateral leg edema   Assessment & Plan    · Patient feels a tightness at the right lower extremity since her last admission here  Thus rule out for DVT  Patient did not have any lower extremity duplex scan that was done on that admission  · Patient's bilateral leg edema, may be due to patient's history of congestive heart failure, but with symptoms on the right lower extremity with tightness and pain, we have to rule out possibility lower extremity DVT    If patient is positive for lower extremity DVT, with new onset atrial fibrillation just diagnosed a few weeks ago, and recurrent RVR we have to rule out possibility of PE, thus patient may need imaging study for this (possible V/Q scan as patient has chronic kidney disease)  Dementia   Assessment & Plan    · Patient has short-term memory loss  · Supportive care  Plan of care pending cardiology evaluation rate remains controlled patient pleasant denies chest pain continue to monitor      Physical Exam   Constitutional: She is oriented to person, place, and time  HENT:   Head: Normocephalic and atraumatic  Eyes: Conjunctivae and EOM are normal    Neck: Normal range of motion  Cardiovascular: Normal rate and normal heart sounds  An irregular rhythm present  Pulmonary/Chest: Effort normal and breath sounds normal    Abdominal: Soft  Bowel sounds are normal    Musculoskeletal: Normal range of motion  She exhibits edema  Neurological: She is alert and oriented to person, place, and time  Skin: Skin is warm and dry  Psychiatric: She has a normal mood and affect  Her behavior is normal          Plan of care as mentioned above will continue to monitor patient

## 2018-04-14 NOTE — ASSESSMENT & PLAN NOTE
· Patient initially had atrial flutter/atrial fibrillation with rapid ventricle response in the emergency room  Rapid ventricle response had resolved with Cardizem  Patient was just diagnosed with atrial flutter/atrial fibrillation last month here at [facility]  · Continue with antiarrhythmic medications  Continue with Coumadin  · Cardiology consult  · Patient's cardiologist in [place] is recommending direct oral anticoagulant instead of Coumadin  I will leave this decision to our inpatient cardiologist here at [facility]  · Questionable role of cardioversion; ablation  We will ask Cardiology about this  This was also mentioned to the patient by her outpatient cardiologist   · No echocardiogram done on the last admission  We will ask Cardiology also about this, if patient needs an echocardiogram on this admission  · TSH was normal last March 22nd of this year  · Patient's rapid ventricle response may also be due to the fact that patient is on a lot of COPD medications/inhalers  Patient is on 2 long-acting beta agonists and inhaled corticosteroid; patient is also both on albuterol nebulization as well as DuoNeb  According to the patient's daughter, she also mentioned this to the staff at the rehab facility  Thus, we will only use 1 long-acting beta agonists and ICS and will just use albuterol nebulization and not the DuoNeb  · I will increase the dose of patient's Coumadin as patient's INR is still subtherapeutic  According to the patient's daughter, patient took all of her medications today, including the Coumadin  Thus next dose will be tomorrow

## 2018-04-14 NOTE — ASSESSMENT & PLAN NOTE
· No exacerbation  · Continue with only 1 long-acting beta agonist and ICS (according to the medication list, patient is on 2 different long-acting inhalers)  I will only continue with the albuterol inhaler on as needed basis and will discontinue with the DuoNeb  This is due to patient's on and off rapid ventricle response  · Oxygen p r n

## 2018-04-14 NOTE — ASSESSMENT & PLAN NOTE
· Chronic diastolic congestive heart failure, presently compensated  · Continue cardiovascular and heart failure medications  · If kidney function worsens significantly, we may need to cut back or hold off on patient's Lasix dose  · Cardiology consult

## 2018-04-15 LAB
ANION GAP SERPL CALCULATED.3IONS-SCNC: 8 MMOL/L (ref 4–13)
BUN SERPL-MCNC: 25 MG/DL (ref 5–25)
CALCIUM SERPL-MCNC: 9.8 MG/DL
CHLORIDE SERPL-SCNC: 103 MMOL/L (ref 100–108)
CO2 SERPL-SCNC: 31 MMOL/L (ref 21–32)
CREAT SERPL-MCNC: 1.51 MG/DL (ref 0.6–1.3)
GFR SERPL CREATININE-BSD FRML MDRD: 35 ML/MIN/1.73SQ M
GLUCOSE SERPL-MCNC: 100 MG/DL (ref 65–140)
INR PPP: 1.48 (ref 0.86–1.16)
POTASSIUM SERPL-SCNC: 3.7 MMOL/L (ref 3.5–5.3)
PROTHROMBIN TIME: 18.3 SECONDS (ref 12.1–14.4)
SODIUM SERPL-SCNC: 142 MMOL/L (ref 136–145)

## 2018-04-15 PROCEDURE — 93971 EXTREMITY STUDY: CPT | Performed by: SURGERY

## 2018-04-15 PROCEDURE — 99232 SBSQ HOSP IP/OBS MODERATE 35: CPT | Performed by: INTERNAL MEDICINE

## 2018-04-15 PROCEDURE — 80048 BASIC METABOLIC PNL TOTAL CA: CPT | Performed by: NURSE PRACTITIONER

## 2018-04-15 PROCEDURE — 99232 SBSQ HOSP IP/OBS MODERATE 35: CPT | Performed by: NURSE PRACTITIONER

## 2018-04-15 PROCEDURE — 85610 PROTHROMBIN TIME: CPT | Performed by: INTERNAL MEDICINE

## 2018-04-15 RX ORDER — DILTIAZEM HYDROCHLORIDE 60 MG/1
120 CAPSULE, EXTENDED RELEASE ORAL EVERY 12 HOURS SCHEDULED
Status: DISCONTINUED | OUTPATIENT
Start: 2018-04-15 | End: 2018-04-18 | Stop reason: HOSPADM

## 2018-04-15 RX ORDER — FUROSEMIDE 40 MG/1
40 TABLET ORAL
Status: DISCONTINUED | OUTPATIENT
Start: 2018-04-15 | End: 2018-04-18 | Stop reason: HOSPADM

## 2018-04-15 RX ADMIN — BUDESONIDE AND FORMOTEROL FUMARATE DIHYDRATE 2 PUFF: 160; 4.5 AEROSOL RESPIRATORY (INHALATION) at 08:00

## 2018-04-15 RX ADMIN — BUPROPION HYDROCHLORIDE 150 MG: 150 TABLET, FILM COATED, EXTENDED RELEASE ORAL at 08:01

## 2018-04-15 RX ADMIN — FERROUS SULFATE TAB 325 MG (65 MG ELEMENTAL FE) 325 MG: 325 (65 FE) TAB at 17:10

## 2018-04-15 RX ADMIN — CARBAMAZEPINE 100 MG: 100 TABLET, CHEWABLE ORAL at 17:09

## 2018-04-15 RX ADMIN — BUDESONIDE AND FORMOTEROL FUMARATE DIHYDRATE 2 PUFF: 160; 4.5 AEROSOL RESPIRATORY (INHALATION) at 17:10

## 2018-04-15 RX ADMIN — LEVOTHYROXINE SODIUM 88 MCG: 88 TABLET ORAL at 06:26

## 2018-04-15 RX ADMIN — METOPROLOL TARTRATE 5 MG: 5 INJECTION, SOLUTION INTRAVENOUS at 17:37

## 2018-04-15 RX ADMIN — METOPROLOL TARTRATE 50 MG: 50 TABLET ORAL at 08:01

## 2018-04-15 RX ADMIN — METOPROLOL TARTRATE 50 MG: 50 TABLET ORAL at 21:35

## 2018-04-15 RX ADMIN — ASPIRIN 81 MG: 81 TABLET, COATED ORAL at 08:01

## 2018-04-15 RX ADMIN — FERROUS SULFATE TAB 325 MG (65 MG ELEMENTAL FE) 325 MG: 325 (65 FE) TAB at 08:01

## 2018-04-15 RX ADMIN — DILTIAZEM HYDROCHLORIDE 60 MG: 60 TABLET, FILM COATED ORAL at 06:26

## 2018-04-15 RX ADMIN — CARBAMAZEPINE 100 MG: 100 TABLET, CHEWABLE ORAL at 08:01

## 2018-04-15 RX ADMIN — ATORVASTATIN CALCIUM 10 MG: 10 TABLET, FILM COATED ORAL at 08:01

## 2018-04-15 RX ADMIN — DILTIAZEM HYDROCHLORIDE 120 MG: 60 CAPSULE, EXTENDED RELEASE ORAL at 13:49

## 2018-04-15 RX ADMIN — DILTIAZEM HYDROCHLORIDE 120 MG: 60 CAPSULE, EXTENDED RELEASE ORAL at 21:35

## 2018-04-15 RX ADMIN — WARFARIN SODIUM 2.5 MG: 2.5 TABLET ORAL at 17:10

## 2018-04-15 RX ADMIN — VITAMIN D, TAB 1000IU (100/BT) 2000 UNITS: 25 TAB at 08:00

## 2018-04-15 RX ADMIN — CARBAMAZEPINE 100 MG: 100 TABLET, CHEWABLE ORAL at 21:35

## 2018-04-15 RX ADMIN — FUROSEMIDE 40 MG: 40 TABLET ORAL at 17:10

## 2018-04-15 RX ADMIN — DIVALPROEX SODIUM 500 MG: 250 TABLET, FILM COATED, EXTENDED RELEASE ORAL at 08:01

## 2018-04-15 RX ADMIN — FENOFIBRATE 48 MG: 48 TABLET ORAL at 08:01

## 2018-04-15 RX ADMIN — FUROSEMIDE 40 MG: 10 INJECTION, SOLUTION INTRAMUSCULAR; INTRAVENOUS at 08:02

## 2018-04-15 NOTE — ASSESSMENT & PLAN NOTE
· Review of patient's BMPs, revealed that the last 1 was 1 48   · Presently, it is 1 54 will repeat a BMP now  · Monitor patient's kidney function  · Avoid nephrotoxins  · Avoid hypotension

## 2018-04-15 NOTE — PROGRESS NOTES
Progress Note Elena Chávez [birth year], 71 y o  female MRN: [id number]    Unit/Bed#: -01 Encounter: [id number]    Primary Care Provider: Wendy Shankar DO   Date and time admitted to hospital: 4/13/2018  7:08 PM        * Atrial flutter [facility])   Assessment & Plan    · Patient initially had atrial flutter/atrial fibrillation with rapid ventricle response in the emergency room  Rapid ventricle response had resolved with Cardizem  Patient was just diagnosed with atrial flutter/atrial fibrillation last month here at [facility]  · Discussion with Cardiology patient transition to long-acting Cardizem today and oral Lasix if patient remains clinically stable can discharge home tomorrow  · Continue with Coumadin  · Patient's cardiologist in [place] is recommending direct oral anticoagulant instead of Coumadin  I will leave this decision to our inpatient cardiologist here at [facility]  · Patient will need a follow-up in the next 1-2 weeks with her cardiologist patient does not have a cardiologist she is recommended to follow up with Dr Ella Elliott  · No echocardiogram done on the last admission  We will ask Cardiology also about this, if patient needs an echocardiogram on this admission  · TSH was normal last March 22nd of this year  · Cardiology further felt patient did have an acute on chronic diastolic CHF exacerbation requiring IV diuretics out transition to oral Lasix  Stage 3 chronic kidney disease   Assessment & Plan    · Review of patient's BMPs, revealed that the last 1 was 1 48   · Presently, it is 1 54 will repeat a BMP now  · Monitor patient's kidney function  · Avoid nephrotoxins  · Avoid hypotension  Hypernatremia   Assessment & Plan    · Mild    Resolved  · Continue to monitor  · BMP now and in the a m         CHF (congestive heart failure) ([facility])   Assessment & Plan    · Acute on Chronic diastolic congestive heart failure requiring IV diuresis patient responded well transition to oral Lasix today  · Continue cardiovascular and heart failure medications  · Patient had noted improvement of creatinine will repeat a BMP now and in a m  COPD (chronic obstructive pulmonary disease) ([facility])   Assessment & Plan    · No exacerbation  · Continue p r n  inhaler         Bilateral leg edema   Assessment & Plan    · Patient feels a tightness at the right lower extremity since her last admission here  · Venous duplex negative for acute DVT  · Overall clinically improved  Dementia   Assessment & Plan    · Patient has short-term memory loss  · Supportive care  VTE Pharmacologic Prophylaxis:   Pharmacologic: Warfarin (Coumadin)  Mechanical VTE Prophylaxis in Place: Yes    Patient Centered Rounds: I have performed bedside rounds with nursing staff today  Discussions with Specialists or Other Care Team Provider:  Cardiology    Education and Discussions with Family / Patient:  Patient; attempt to reach daughter via phone unsuccessful    Time Spent for Care: 25 minutes  More than 50% of total time spent on counseling and coordination of care as described above  Current Length of Stay: 2 day(s)    Current Patient Status: Inpatient   Certification Statement: The patient will continue to require additional inpatient hospital stay due to Need for ongoing acute treatment of atrial flutter and acute CHF    Discharge Plan:   Likely tomorrow    Code Status: Level 3 - DNAR and DNI      Subjective:   Patient alert pleasant sitting out of bed has no complaints  Patient reports overall she feels better her edema has improved  Patient with no other general complaints at this time  Objective:     Vitals:   Temp (24hrs), Av 8 °F (36 6 °C), Min:97 5 °F (36 4 °C), Max:98 1 °F (36 7 °C)    HR:  [] 77  Resp:  [16-18] 16  BP: (111-138)/(63-84) 133/72  SpO2:  [90 %-95 %] 95 %  Body mass index is 36 17 kg/m²       Input and Output Summary (last 24 hours): Intake/Output Summary (Last 24 hours) at 04/15/18 1315  Last data filed at 04/15/18 0602   Gross per 24 hour   Intake                0 ml   Output             1950 ml   Net            -1950 ml       Physical Exam:     Physical Exam   HENT:   Head: Normocephalic and atraumatic  Eyes: Conjunctivae and EOM are normal    Neck: Normal range of motion  Cardiovascular: Normal rate, regular rhythm and normal heart sounds  Pulmonary/Chest: Effort normal and breath sounds normal    Abdominal: Soft  Bowel sounds are normal    Musculoskeletal: Normal range of motion  She exhibits no edema  Neurological: She is alert  Noted short-term memory loss  Recall is reduced   Skin: Skin is warm and dry  Psychiatric: She has a normal mood and affect  Her behavior is normal          Additional Data:     Labs:      Results from last 7 days  Lab Units 04/14/18  0516 04/13/18 2022   WBC Thousand/uL 5 71 6 11   HEMOGLOBIN g/dL 10 7* 11 7   HEMATOCRIT % 34 0* 36 6   PLATELETS Thousands/uL 121* 139*   NEUTROS PCT %  --  42*   LYMPHS PCT %  --  41   MONOS PCT %  --  9   EOS PCT %  --  7*       Results from last 7 days  Lab Units 04/14/18  0517 04/13/18 2022   SODIUM mmol/L 145 146*   POTASSIUM mmol/L 3 5 3 7   CHLORIDE mmol/L 110* 108   CO2 mmol/L 30 30   BUN mg/dL 24 25   CREATININE mg/dL 1 54* 1 71*   CALCIUM mg/dL 9 0 9 2   TOTAL PROTEIN g/dL  --  7 2   BILIRUBIN TOTAL mg/dL  --  0 30   ALK PHOS U/L  --  57   ALT U/L  --  20   AST U/L  --  18   GLUCOSE RANDOM mg/dL 101 125       Results from last 7 days  Lab Units 04/15/18  0013   INR  1 48*       * I Have Reviewed All Lab Data Listed Above    * Additional Pertinent Lab Tests Reviewed: Labs Pending At The Time Of This Note        Recent Cultures (last 7 days):           Last 24 Hours Medication List:     Current Facility-Administered Medications:  acetaminophen 488 mg Oral Q6H PRN Augustin Herbert MD   aspirin 81 mg Oral Daily Augustin Herbert MD   atorvastatin 10 mg Oral Daily Augustin Herbert MD   budesonide-formoterol 2 puff Inhalation BID Augustin Herbert MD   buPROPion 150 mg Oral Daily Augustin Herbert MD   carBAMazepine 100 mg Oral TID Augustin Herbert MD   cholecalciferol 2,000 Units Oral Daily Augustin Herbert MD   diltiazem 120 mg Oral Q12H Tony Ramos MD   divalproex sodium 500 mg Oral Daily Augustin Herbert MD   fenofibrate 48 mg Oral Daily Augustin Herbert MD   ferrous sulfate 325 mg Oral BID Augustin Herbert MD   furosemide 40 mg Oral BID (diuretic) Meena Temple MD   levalbuterol 1 25 mg Nebulization Q6H PRN Augustin Herbert MD   levothyroxine 88 mcg Oral QAM Augustin Herbert MD   metoprolol 5 mg Intravenous Q6H PRN Augustin Herbert MD   metoprolol tartrate 50 mg Oral Q12H [facility] Augustin Herbert MD   ondansetron 4 mg Intravenous Q6H PRN Augustin Herbert MD   senna-docusate sodium 1 tablet Oral Daily PRN Augustin Herbert MD   warfarin 2 5 mg Oral Daily (warfarin) Nichole Sweet MD        Today, Patient Was Seen By: TANVI Wilson    ** Please Note: Dictation voice to text software may have been used in the creation of this document   **

## 2018-04-15 NOTE — CASE MANAGEMENT
Initial Clinical Review    Admission: Date/Time/Statement: 4/13/18 @ 2250     Orders Placed This Encounter   Procedures    Inpatient Admission (expected length of stay for this patient is greater than two midnights)     Standing Status:   Standing     Number of Occurrences:   1     Order Specific Question:   Admitting Physician     Answer:   Alex Gasca [[id number]]     Order Specific Question:   Level of Care     Answer:   Med Surg [[id number]]     Order Specific Question:   Estimated length of stay     Answer:   More than 2 Midnights     Order Specific Question:   Certification     Answer:   I certify that inpatient services are medically necessary for this patient for a duration of greater than two midnights  See H&P and MD Progress Notes for additional information about the patient's course of treatment  ED: Date/Time/Mode of Arrival:   ED Arrival Information     Expected Arrival Acuity Means of Arrival Escorted By Service Admission Type    - 4/13/2018 19:01 Urgent [address] Urgent    Arrival Complaint    A FIB          Chief Complaint:   Chief Complaint   Patient presents with    Irregular Heart Beat     Patient states she is coming from her cardiologist office, where her cardiologist told her to come for furthur evaluation of A-fib  Patient states she was here in our hospital on the 22nd March with same problem  History of Illness: [place] Brianna Tobias is a 71 y  o  female who presents with rapid heart rate to the emergency room at [address] [place] [address] has history of dementia and has short-term memory loss, thus patient's HPI was from the patient's daughter at bedside   According to the patient's daughter, her mother was just here a few weeks ago, and was diagnosed with a her respiratory tract infection and a new onset heart rhythm abnormality   From a review of patient's previous records, patient had a new onset atrial flutter at that time   Patient was discharged on Coumadin and prescribed Cardizem and metoprolol   According to the patient's daughter, patient was very physically deconditioned here as she spent several days here was even transferred to the ICU, thus, patient was eventually discharge to a rehabilitation facility  Johnie Rausch today, from the rehabilitation facility, patient was brought to her cardiologist up in [place] [address] in the office, her EKG revealed significant rapid heart rate at 140-150 per minute   Thus, patient's cardiologist advised the patient to go to the nearest emergency room  [facility], since patient lives here in [facility], they opted to come back here   According to the patient's daughter, patient's cardiologist told her, that patient's Coumadin may need to be changed to a new blood thinner, and that patient may eventually need a procedure on her heart to control the heart rate/heart rhythm   On questioning of the patient, patient denied any chest pains, but admitted to having some shortness of breath awhile ago when her heart rate was fast   According the patient's daughter, patient also had some nausea episodes earlier today but no vomiting episodes   Patient denies any chest pains or any abdominal pains   Patient admits to some mild headache right now while here   Patient also admits to right lower extremity tightness which she had been feeling since she was admitted here   Patient's daughter is concerned about a blood clot in the leg, which she was also told by patient's outpatient cardiologist  Nasir Ledesma still has some occasional cough but not as bad as before  Patient denies any other symptoms other the ones mentioned above   In the emergency room, patient was found to have atrial flutter/atrial fibrillation with rapid ventricular response at 130 per minute   Patient was given doses of IV beta blocker and eventually IV Cardizem, which made patient's heart rate normal    ED Vital Signs:   ED Triage Vitals   Temperature Pulse Respirations Blood Pressure SpO2   04/13/18 1913 04/13/18 1913 04/13/18 1913 04/13/18 1913 04/13/18 1913   98 °F (36 7 °C) (!) 131 20 116/77 92 %      Temp Source Heart Rate Source Patient Position - Orthostatic VS BP Location FiO2 (%)   04/13/18 1913 04/13/18 1913 04/13/18 1913 04/13/18 1913 --   Oral Monitor Sitting Right arm       Pain Score       04/14/18 0000       No Pain        Wt Readings from Last 1 Encounters:   04/15/18 84 kg (185 lb 3 oz)       Vital Signs (abnormal):  04/13/18 2354 98 2 °F (36 8 °C)  120 19 134/60 91 % 84 8 kg (186 lb 15 2 oz) AC   04/13/18 2230 -- 72 20 112/70 93 % -- DW   04/13/18 2130 --  130 18 120/77 94 % -- DW   04/13/18 2030 --  133 16 116/87            Abnormal Labs/Diagnostic Test Results: BNP  5492, Na 146, BUN creat 25 1 71, alb 3 4, pt inr  20 5 1 71, plt  139  EKG-   Atrial flutter with rapid ventricular response  Nonspecific ST and T wave abnormality  Abnormal ECG            ED Treatment:   Medication Administration from 04/13/2018 1901 to 04/13/2018 2354       Date/Time Order Dose Route Action Action by Comments     04/13/2018 2128 metoprolol (LOPRESSOR) injection 5 mg 5 mg Intravenous Given Alvin Hinojosa RN      04/13/2018 2201 diltiazem (CARDIZEM) injection 10 mg 10 mg Intravenous Given Alvin Hinojosa RN      04/13/2018 2246 diltiazem (CARDIZEM) tablet 30 mg 30 mg Oral Given Alvin Hinojosa RN           Past Medical/Surgical History:    Active Ambulatory Problems     Diagnosis Date Noted    HTN (hypertension) 12/10/2017    COPD (chronic obstructive pulmonary disease) ([facility] ) 12/10/2017    CHF (congestive heart failure) ([facility] ) 12/10/2017    Acute respiratory failure with hypoxia ([facility] ) 03/22/2018    Atrial flutter ([facility] ) 03/22/2018    Back pain 03/22/2018    Dementia 03/23/2018    HLD (hyperlipidemia) 03/23/2018    UTI (urinary tract infection) 03/24/2018    Seizure disorder ([facility] ) 03/24/2018    RSV infection 03/24/2018    Ambulatory dysfunction 03/29/2018     Resolved Ambulatory Problems     Diagnosis Date Noted    Cystitis 12/10/2017    Elevated troponin 12/10/2017    [facility]AM [facility]acute kidney injury) ([facility] ) 12/10/2017     Past Medical History:   Diagnosis Date    Brain aneurysm     CAD (coronary artery disease)     Cardiac disease     [facility]est[facility]     CKD (chronic kidney disease)     COPD (chronic obstructive pulmonary disease) ([facility])     Dementia     Dementia     Disease of thyroid gland     Hyperlipidemia     Hyperlipidemia     Hypertension     MI (myocardial infarction) ([place] [address] )     Migraine     Renal disorder     Seizures ([place] [address] )     Stroke ([place] [address] )        Admitting Diagnosis: Atrial flutter ([facility]) [I48 92]  CHF (congestive heart failure) ([place] [address] ) [I50 9]  Irregular heart beat [I49 9]    Age/Sex: 71 y o  female    Assessment/Plan:          Atrial flutter ([place] [address] )   Assessment & Plan     · Patient initially had atrial flutter/atrial fibrillation with rapid ventricle response in the emergency room   Rapid ventricle response had resolved with Cardizem   Patient was just diagnosed with atrial flutter/atrial fibrillation last month here at [facility]  · Continue with antiarrhythmic medications   Continue with Coumadin  · Cardiology consult  · Patient's cardiologist in [place] is recommending direct oral anticoagulant instead of Coumadin   I will leave this decision to our inpatient cardiologist here at [facility]  · Questionable role of cardioversion; ablation   We will ask Cardiology about this   This was also mentioned to the patient by her outpatient cardiologist   · No echocardiogram done on the last admission  Jocelin Ruelas will ask Cardiology also about this, if patient needs an echocardiogram on this admission  · TSH was normal last March 22nd of this year    · Patient's rapid ventricle response may also be due to the fact that patient is on a lot of COPD medications/inhalers   Patient is on 2 long-acting beta agonists and inhaled corticosteroid; patient is also both on albuterol nebulization as well as DuoNeb   According to the patient's daughter, she also mentioned this to the staff at the rehab facility   Thus, we will only use 1 long-acting beta agonists and ICS and will just use albuterol nebulization and not the DuoNeb  · I will increase the dose of patient's Coumadin as patient's INR is still subtherapeutic   According to the patient's daughter, patient took all of her medications today, including the Coumadin   Thus next dose will be tomorrow             Stage 3 chronic kidney disease   Assessment & Plan     · Review of patient's BMPs, revealed that the last 1 was 1 48   · Presently, it is 1 7  · Monitor patient's kidney function  · Avoid nephrotoxins  · Avoid hypotension  · If kidney function/creatinine worsens significantly, we will need to do further workup and management; may need to cut back on patient's Lasix          CHF (congestive heart failure) ([facility])   Assessment & Plan     · Chronic diastolic congestive heart failure, presently compensated  · Continue cardiovascular and heart failure medications  · If kidney function worsens significantly, we may need to cut back or hold off on patient's Lasix dose  · Cardiology consult           Bilateral leg edema   Assessment & Plan     · Patient feels a tightness at the right lower extremity since her last admission here   Thus rule out for DVT   Patient did not have any lower extremity duplex scan that was done on that admission    · Patient's bilateral leg edema, may be due to patient's history of congestive heart failure, but with symptoms on the right lower extremity with tightness and pain, we have to rule out possibility lower extremity DVT   If patient is positive for lower extremity DVT, with new onset atrial fibrillation just diagnosed a few weeks ago, and recurrent RVR we have to rule out possibility of PE, thus patient may need imaging study for this (possible V/Q scan as patient has chronic kidney disease)          Hypernatremia   Assessment & Plan     · Mild  · Likely due to diuresis  · Repeat BMP tomorrow  · If worsening significantly, we will need to decrease the dose or hold off the dose of Lasix           Dementia   Assessment & Plan     · Patient has short-term memory loss  · Supportive care           COPD (chronic obstructive pulmonary disease) (HCC)   Assessment & Plan     · No exacerbation  · Continue with only 1 long-acting beta agonist and ICS (according to the medication list, patient is on 2 different long-acting inhalers)   I will only continue with the albuterol inhaler on as needed basis and will discontinue with the DuoNeb   This is due to patient's on and off rapid ventricle response  · Oxygen p r n                 VTE Prophylaxis: Warfarin (Coumadin)  / reason for no mechanical VTE prophylaxis Rule out DVT with bilateral lower extremity swelling   Code Status:  DNR DNI  POLST: POLST form is not discussed and not completed at this time    Discussion with family:  I spoke to the patient's daughter at bedside  [place] got patient's HPI from her also   I discussed our findings and plans   I answered all questions and concerns      Anticipated Length of Stay: Christian Barclay will be admitted on an Inpatient basis with an anticipated length of stay of  greater than 2 midnights    Justification for Hospital Stay:  Due to the above findings and plans          Admission Orders:  Scheduled Meds:   Current Facility-Administered Medications:  acetaminophen 488 mg Oral Q6H PRN Augustin Herbert MD   aspirin 81 mg Oral Daily Augustin Herbert MD   atorvastatin 10 mg Oral Daily Augustin Herbert MD   budesonide-formoterol 2 puff Inhalation BID Augustin Herbert MD   buPROPion 150 mg Oral Daily Augustin Herbert MD   carBAMazepine 100 mg Oral TID Augustin Herbert MD   cholecalciferol 2,000 Units Oral Daily Augustin Herbert MD   diltiazem 60 mg Oral Q6H [facility] JANNETTE Herbert MD   divalproex sodium 500 mg Oral Daily Augustin Herbert MD   fenofibrate 48 mg Oral Daily Augustin Herbert MD   ferrous sulfate 325 mg Oral BID Augustin Herbert MD   furosemide 40 mg Intravenous BID (diuretic) Megha Koehler PA-C   levalbuterol 1 25 mg Nebulization Q6H PRN Augustin Herbert MD   levothyroxine 88 mcg Oral QAM Augustin Herbert MD   metoprolol 5 mg Intravenous Q6H PRN Augustin Herbert MD   metoprolol tartrate 50 mg Oral Q12H [facility] Augustin Herbert MD   ondansetron 4 mg Intravenous Q6H PRN Augustin Herbert MD   senna-docusate sodium 1 tablet Oral Daily PRN Augustin Herbert MD   warfarin 2 5 mg Oral Daily (warfarin) Augustin Herbert MD     Continuous Infusions:    PRN Meds:   acetaminophen    levalbuterol    metoprolol    ondansetron    senna-docusate sodium     Cardiac diet   Up w/ assist   Inc spirom   Daily weight   I&O   Tele  O2 keep sat > 92 %   Cardiology consult   4/15 pt inr   18 3   1 48    IM note  4/14  * Atrial flutter ([address] )   Assessment & Plan     · Patient initially had atrial flutter/atrial fibrillation with rapid ventricle response in the emergency room  Rapid ventricle response had resolved with Cardizem  Patient was just diagnosed with atrial flutter/atrial fibrillation last month here at [facility]  · Continue with antiarrhythmic medications  Continue with Coumadin  · Cardiology consult  · Patient's cardiologist in [place] is recommending direct oral anticoagulant instead of Coumadin  I will leave this decision to our inpatient cardiologist here at [facility]  · Questionable role of cardioversion; ablation  We will ask Cardiology about this  This was also mentioned to the patient by her outpatient cardiologist   · No echocardiogram done on the last admission  We will ask Cardiology also about this, if patient needs an echocardiogram on this admission  · TSH was normal last March 22nd of this year    · Patient's rapid ventricle response may also be due to the fact that patient is on a lot of COPD medications/inhalers  Patient is on 2 long-acting beta agonists and inhaled corticosteroid; patient is also both on albuterol nebulization as well as DuoNeb  According to the patient's daughter, she also mentioned this to the staff at the rehab facility  Thus, we will only use 1 long-acting beta agonists and ICS and will just use albuterol nebulization and not the DuoNeb  · I will increase the dose of patient's Coumadin as patient's INR is still subtherapeutic  According to the patient's daughter, patient took all of her medications today, including the Coumadin  Thus next dose will be tomorrow             Stage 3 chronic kidney disease   Assessment & Plan     · Review of patient's BMPs, revealed that the last 1 was 1 48   · Presently, it is 1 7  · Monitor patient's kidney function  · Avoid nephrotoxins  · Avoid hypotension  · If kidney function/creatinine worsens significantly, we will need to do further workup and management; may need to cut back on patient's Lasix          Hypernatremia   Assessment & Plan     · Mild  · Likely due to diuresis  · Repeat BMP tomorrow  · If worsening significantly, we will need to decrease the dose or hold off the dose of Lasix           CHF (congestive heart failure) ([facility])   Assessment & Plan     · Chronic diastolic congestive heart failure, presently compensated  · Continue cardiovascular and heart failure medications  · If kidney function worsens significantly, we may need to cut back or hold off on patient's Lasix dose  · Cardiology consult           COPD (chronic obstructive pulmonary disease) ([facility] )   Assessment & Plan     · No exacerbation  · Continue with only 1 long-acting beta agonist and ICS (according to the medication list, patient is on 2 different long-acting inhalers)  I will only continue with the albuterol inhaler on as needed basis and will discontinue with the DuoNeb  This is due to patient's on and off rapid ventricle response  · Oxygen p r n           Bilateral leg edema   Assessment & Plan     · Patient feels a tightness at the right lower extremity since her last admission here  Thus rule out for DVT  Patient did not have any lower extremity duplex scan that was done on that admission  · Patient's bilateral leg edema, may be due to patient's history of congestive heart failure, but with symptoms on the right lower extremity with tightness and pain, we have to rule out possibility lower extremity DVT  If patient is positive for lower extremity DVT, with new onset atrial fibrillation just diagnosed a few weeks ago, and recurrent RVR we have to rule out possibility of PE, thus patient may need imaging study for this (possible V/Q scan as patient has chronic kidney disease)          Dementia   Assessment & Plan     · Patient has short-term memory loss  · Supportive care             Cardiology note  4/14  Assessment/Plan:  1  Atrial flutter:  -currently rate controlled, HR 60s  Continue Cardizem 60 mg p o  Q 6, Lopressor 50 mg p o  Q 12 for rate control  -continue Coumadin for anticoagulation     2  Acute on chronic diastolic CHF:  -volume overloaded on exam  -BNP 5492  CXR was not done this admission  -ECHO from December 2017 shows EF is 55-65%, no regional wall motion abnormalities, grade 1 diastolic dysfunction  -serum Cr 1 71 on arrival, last recorded serum creatinine 1 54  Improved with diuresis  Will increase Lasix to 40 mg IV b i d   -continue Lopressor  -low-salt diet, daily weights, I/O     3  CAD S/P PCI:   -no anginal symptoms at this time  Continue aspirin, statin, beta-blocker      4  Hypertension:  Stable, continue present regimen      5    Hyperlipidemia:  Continue statin      Code Status: Level 3 - DNAR and DNI

## 2018-04-15 NOTE — PLAN OF CARE
Problem: Potential for Falls  Goal: Patient will remain free of falls  INTERVENTIONS:  - Assess patient frequently for physical needs  -  Identify cognitive and physical deficits and behaviors that affect risk of falls  -  [place] fall precautions as indicated by assessment   - Educate patient/family on patient safety including physical limitations  - Instruct patient to call for assistance with activity based on assessment  - Modify environment to reduce risk of injury  - Consider OT/PT consult to assist with strengthening/mobility   Outcome: Progressing      Problem: Prexisting or High Potential for Compromised Skin Integrity  Goal: Skin integrity is maintained or improved  INTERVENTIONS:  - Identify patients at risk for skin breakdown  - Assess and monitor skin integrity  - Assess and monitor nutrition and hydration status  - Monitor labs (i e  albumin)  - Assess for incontinence   - Turn and reposition patient  - Assist with mobility/ambulation  - Relieve pressure over bony prominences  - Avoid friction and shearing  - Provide appropriate hygiene as needed including keeping skin clean and dry  - Evaluate need for skin moisturizer/barrier cream  - Collaborate with interdisciplinary team (i e  Nutrition, Rehabilitation, etc )   - Patient/family teaching   Outcome: Progressing      Problem: NEUROSENSORY - ADULT  Goal: Achieves stable or improved neurological status  INTERVENTIONS  - Monitor and report changes in neurological status  - Initiate measures to prevent increased intracranial pressure  - Maintain blood pressure and fluid volume within ordered parameters to optimize cerebral perfusion  - Monitor temperature, glucose, and sodium or any other associated labs   Initiate appropriate interventions as ordered  - Monitor for seizure activity   - Administer anti-seizure medications as ordered   Outcome: Progressing    Goal: Absence of seizures  INTERVENTIONS  - Monitor for seizure activity  - Administer anti-seizure medications as ordered  - Monitor neurological status   Outcome: Progressing    Goal: Remains free of injury related to seizures activity  INTERVENTIONS  - Maintain airway, patient safety  and administer oxygen as ordered  - Monitor patient for seizure activity, document and report duration and description of seizure to physician/advanced practitioner  - If seizure occurs,  ensure patient safety during seizure  - Reorient patient post seizure  - Seizure pads on all 4 side rails  - Instruct patient/family to notify RN of any seizure activity including if an aura is experienced  - Instruct patient/family to call for assistance with activity based on nursing assessment  - Administer anti-seizure medications as ordered  - Monitor fetal well being   Outcome: Progressing    Goal: Achieves maximal functionality and self care  INTERVENTIONS  - Monitor swallowing and airway patency with patient fatigue and changes in neurological status  - Encourage and assist patient to increase activity and self care with guidance from rehab services  - Encourage visually impaired, hearing impaired and aphasic patients to use assistive/communication devices   Outcome: Progressing      Problem: CARDIOVASCULAR - ADULT  Goal: Maintains optimal cardiac output and hemodynamic stability  INTERVENTIONS:  - Monitor I/O, vital signs and rhythm  - Monitor for S/S and trends of decreased cardiac output i e  bleeding, hypotension  - Administer and titrate ordered vasoactive medications to optimize hemodynamic stability  - Assess quality of pulses, skin color and temperature  - Assess for signs of decreased coronary artery perfusion - ex   Angina  - Instruct patient to report change in severity of symptoms   Outcome: Progressing    Goal: Absence of cardiac dysrhythmias or at baseline rhythm  INTERVENTIONS:  - Continuous cardiac monitoring, monitor vital signs, obtain 12 lead EKG if indicated  - Administer antiarrhythmic and heart rate control medications as ordered  - Monitor electrolytes and administer replacement therapy as ordered   Outcome: Progressing      Problem: RESPIRATORY - ADULT  Goal: Achieves optimal ventilation and oxygenation  INTERVENTIONS:  - Assess for changes in respiratory status  - Assess for changes in mentation and behavior  - Position to facilitate oxygenation and minimize respiratory effort  - Oxygen administration by appropriate delivery method based on oxygen saturation (per order) or ABGs  - Initiate smoking cessation education as indicated  - Encourage broncho-pulmonary hygiene including cough, deep breathe, Incentive Spirometry  - Assess the need for suctioning and aspirate as needed  - Assess and instruct to report SOB or any respiratory difficulty  - Respiratory Therapy support as indicated   Outcome: Progressing      Problem: GENITOURINARY - ADULT  Goal: Maintains or returns to baseline urinary function  INTERVENTIONS:  - Assess urinary function  - Encourage oral fluids to ensure adequate hydration  - Administer IV fluids as ordered to ensure adequate hydration  - Administer ordered medications as needed  - Offer frequent toileting  - Follow urinary retention protocol if ordered   Outcome: Progressing    Goal: Absence of urinary retention  INTERVENTIONS:  - Assess patient's ability to void and empty bladder  - Monitor I/O  - Bladder scan as needed  - Discuss with physician/AP medications to alleviate retention as needed  - Discuss catheterization for long term situations as appropriate   Outcome: Progressing    Goal: Urinary catheter remains patent  INTERVENTIONS:  - Assess patency of urinary catheter  - If patient has a chronic baca, consider changing catheter if non-functioning  - Follow guidelines for intermittent irrigation of non-functioning urinary catheter   Outcome: Progressing      Problem: METABOLIC, FLUID AND ELECTROLYTES - ADULT  Goal: Electrolytes maintained within normal limits  INTERVENTIONS:  - Monitor labs and assess patient for signs and symptoms of electrolyte imbalances  - Administer electrolyte replacement as ordered  - Monitor response to electrolyte replacements, including repeat lab results as appropriate  - Instruct patient on fluid and nutrition as appropriate   Outcome: Progressing    Goal: Fluid balance maintained  INTERVENTIONS:  - Monitor labs and assess for signs and symptoms of volume excess or deficit  - Monitor I/O and WT  - Instruct patient on fluid and nutrition as appropriate   Outcome: Progressing      Problem: SKIN/TISSUE INTEGRITY - ADULT  Goal: Skin integrity remains intact  INTERVENTIONS  - Identify patients at risk for skin breakdown  - Assess and monitor skin integrity  - Assess and monitor nutrition and hydration status  - Monitor labs (i e  albumin)  - Assess for incontinence   - Turn and reposition patient  - Assist with mobility/ambulation  - Relieve pressure over bony prominences  - Avoid friction and shearing  - Provide appropriate hygiene as needed including keeping skin clean and dry  - Evaluate need for skin moisturizer/barrier cream  - Collaborate with interdisciplinary team (i e  Nutrition, Rehabilitation, etc )   - Patient/family teaching   Outcome: Progressing    Goal: Incision(s), wounds(s) or drain site(s) healing without S/S of infection  INTERVENTIONS  - Assess and document risk factors for skin impairment   - Assess and document dressing, incision, wound bed, drain sites and surrounding tissue  - Initiate Nutrition services consult and/or wound management as needed   Outcome: Progressing    Goal: Oral mucous membranes remain intact  INTERVENTIONS  - Assess oral mucosa and hygiene practices  - Implement preventative oral hygiene regimen  - Implement oral medicated treatments as ordered  - Initiate Nutrition services referral as needed   Outcome: Progressing      Problem: MUSCULOSKELETAL - ADULT  Goal: Maintain or return mobility to safest level of function  INTERVENTIONS:  - Assess patient's ability to carry out ADLs; assess patient's baseline for ADL function and identify physical deficits which impact ability to perform ADLs (bathing, care of mouth/teeth, toileting, grooming, dressing, etc )  - Assess/evaluate cause of self-care deficits   - Assess range of motion  - Assess patient's mobility; develop plan if impaired  - Assess patient's need for assistive devices and provide as appropriate  - Encourage maximum independence but intervene and supervise when necessary  - Involve family in performance of ADLs  - Assess for home care needs following discharge   - Request OT consult to assist with ADL evaluation and planning for discharge  - Provide patient education as appropriate   Outcome: Progressing    Goal: Maintain proper alignment of affected body part  INTERVENTIONS:  - Support, maintain and protect limb and body alignment  - Provide pt/fam with appropriate education   Outcome: Progressing

## 2018-04-15 NOTE — ASSESSMENT & PLAN NOTE
· Patient initially had atrial flutter/atrial fibrillation with rapid ventricle response in the emergency room  Rapid ventricle response had resolved with Cardizem  Patient was just diagnosed with atrial flutter/atrial fibrillation last month here at [facility]  · Discussion with Cardiology patient transition to long-acting Cardizem today and oral Lasix if patient remains clinically stable can discharge home tomorrow  · Continue with Coumadin  · Patient's cardiologist in [place] is recommending direct oral anticoagulant instead of Coumadin  I will leave this decision to our inpatient cardiologist here at [facility]  · Patient will need a follow-up in the next 1-2 weeks with her cardiologist patient does not have a cardiologist she is recommended to follow up with Dr Chandni Gould  · No echocardiogram done on the last admission  We will ask Cardiology also about this, if patient needs an echocardiogram on this admission  · TSH was normal last March 22nd of this year  · Cardiology further felt patient did have an acute on chronic diastolic CHF exacerbation requiring IV diuretics out transition to oral Lasix

## 2018-04-15 NOTE — ASSESSMENT & PLAN NOTE
· Patient feels a tightness at the right lower extremity since her last admission here  · Venous duplex negative for acute DVT  · Overall clinically improved

## 2018-04-15 NOTE — ASSESSMENT & PLAN NOTE
· Acute on Chronic diastolic congestive heart failure requiring IV diuresis patient responded well transition to oral Lasix today  · Continue cardiovascular and heart failure medications  · Patient had noted improvement of creatinine will repeat a BMP now and in a m

## 2018-04-15 NOTE — PROGRESS NOTES
General Cardiology   Progress Note   Shara Justice 71 y o  female MRN: [id number]  Unit/Bed#: -01 Encounter: [id number]        Subjective:    No significant events since the last encounter  Dyspnea has improved significantly, heart rates well controlled  Patient is diuresed well  Serum creatinine pending today    Objective:   Vitals:  Vitals:    04/15/18 0700   BP: 133/72   Pulse: 77   Resp: 16   Temp: 97 5 °F (36 4 °C)   SpO2: 95%       Body mass index is 36 17 kg/m²  Systolic (21FAA), WHT:338 , Min:111 , WKW:524     Diastolic (97ZQZ), OUX:79, Min:63, Max:84      Intake/Output Summary (Last 24 hours) at 04/15/18 1203  Last data filed at 04/15/18 0602   Gross per 24 hour   Intake              120 ml   Output             1950 ml   Net            -1830 ml     Weight (last 2 days)     Date/Time   Weight    04/15/18 0600  84 (185 19)    04/14/18 0600  84 8 (186 95)    04/13/18 2354  84 8 (186 95)    04/13/18 1913  83 7 (184 56)              Telemetry Review:   Atrial flutte, rate controlled rTelemetry Review:    PHYSICAL EXAMS:  General:  Patient is not in acute distress, laying in the bed comfortably, awake, alert responding to commands  Head: Normocephalic, Atraumatic  HEENT: White sclera, pink conjunctiva,  PERRLA,pharynx benign  Neck:  Supple, no neck vein distention, carotids+2/+2 no bruits, thyromegaly, adenopathy  Respiratory: clear to P/A, decreased breath sound  Cardiovascular:  PMI normal, S1-S2 normal, No  Murmurs, thrills, gallops, rubs   Irregular  GI:  Abdomen soft nontender   No hepatosplenomegaly, adenopathy, ascites,or rebound tenderness  Extremities: No edema, normal pulses, no calf tenderness, no joint deformities, no venous disease   Integument:  No skin rashes or ulceration  Lymphatic:  No cervical or inguinal lymphadenopathy  Neurologic:  Patient is awake alert, responding to command, well-oriented to time and place and person moving all extremities      LABORATORY RESULTS:    Results from last 7 days  Lab Units 04/13/18 2022   TROPONIN I ng/mL <0 02     CBC with diff:   Results from last 7 days  Lab Units 04/14/18 0516 04/13/18 2022   WBC Thousand/uL 5 71 6 11   HEMOGLOBIN g/dL 10 7* 11 7   HEMATOCRIT % 34 0* 36 6   MCV fL 105* 105*   PLATELETS Thousands/uL 121* 139*   MCH pg 33 0 33 5   MCHC g/dL 31 5 32 0   RDW % 13 2 13 2   MPV fL 12 1 12 1   NRBC AUTO /100 WBCs  --  0       CMP:  Results from last 7 days  Lab Units 04/14/18  0517 04/13/18 2022   SODIUM mmol/L 145 146*   POTASSIUM mmol/L 3 5 3 7   CHLORIDE mmol/L 110* 108   CO2 mmol/L 30 30   ANION GAP mmol/L 5 8   BUN mg/dL 24 25   CREATININE mg/dL 1 54* 1 71*   GLUCOSE RANDOM mg/dL 101 125   CALCIUM mg/dL 9 0 9 2   AST U/L  --  18   ALT U/L  --  20   ALK PHOS U/L  --  57   TOTAL PROTEIN g/dL  --  7 2   BILIRUBIN TOTAL mg/dL  --  0 30   EGFR ml/min/1 73sq m 34 30       BMP:  Results from last 7 days  Lab Units 04/14/18 0517 04/13/18 2022   SODIUM mmol/L 145 146*   POTASSIUM mmol/L 3 5 3 7   CHLORIDE mmol/L 110* 108   CO2 mmol/L 30 30   BUN mg/dL 24 25   CREATININE mg/dL 1 54* 1 71*   GLUCOSE RANDOM mg/dL 101 125   CALCIUM mg/dL 9 0 9 2         Results from last 7 days  Lab Units 04/13/18 2022   NT-PRO BNP pg/mL 5,492*        Results from last 7 days  Lab Units 04/14/18 0517   MAGNESIUM mg/dL 2 0               Results from last 7 days  Lab Units 04/15/18  0013 04/13/18 2022   INR  1 48* 1 71*       Lipid Profile:   No results found for: CHOL  No results found for: HDL  No results found for: LDLCALC  No results found for: TRIG    Cardiac testing:   Results for orders placed during the hospital encounter of 12/10/17   Echo complete with contrast if indicated    Narrative [address] [place] [address] A [place], [place] [address] [phone]    Transthoracic Echocardiogram  2D, M-mode, Doppler, and Color Doppler    Study date:  11-Dec-2017    Patient: Rachel Ingram  MR number: [id number]  Account number: [de-identified]  : 1945  Age: 71 years  Gender: Female  Status: Inpatient  Location: Bedside  Height: 60 in  Weight: 192 lb  BP: 99/ 50 mmHg    Indications: Shortness of breath  Diagnoses: R06 00 - Dyspnea, unspecified, R06 02 - Shortness of breath    Sonographer:  Eileen Polo  Interpreting Physician:  Ninfa Carlos MD  Referring Physician:  Paxton Huff PA-C  Group:  [facility]    SUMMARY    LEFT VENTRICLE:  Systolic function was normal  Ejection fraction was estimated in the range of 55 % to 65 %  There were no regional wall motion abnormalities  There was mild concentric hypertrophy  Doppler parameters were consistent with abnormal left ventricular relaxation (grade 1 diastolic dysfunction)  MITRAL VALVE:  There was mild annular calcification  There was mild regurgitation  AORTIC VALVE:  There was mild regurgitation  TRICUSPID VALVE:  There was mild regurgitation  PULMONIC VALVE:  There was mild regurgitation  HISTORY: PRIOR HISTORY: Elevated trponin, Dementia, Seizures, Stroke, Myocardial infarction  Congestive heart failure  Risk factors: hypertension and hypercholesterolemia  Chronic lung disease  PROCEDURE: The procedure was performed at the bedside  This was a routine study  The transthoracic approach was used  The study included complete 2D imaging, M-mode, complete spectral Doppler, and color Doppler  The heart rate was 68 bpm,  at the start of the study  Images were obtained from the parasternal, apical, subcostal, and suprasternal notch acoustic windows  Echocardiographic views were limited due to poor acoustic window availability, decreased penetration, and  lung interference  This was a technically difficult study  LEFT VENTRICLE: Size was normal  Systolic function was normal  Ejection fraction was estimated in the range of 55 % to 65 %  There were no regional wall motion abnormalities   Wall thickness was normal  There was mild concentric  hypertrophy  DOPPLER: Doppler parameters were consistent with abnormal left ventricular relaxation (grade 1 diastolic dysfunction)  RIGHT VENTRICLE: The size was normal  Systolic function was normal  Wall thickness was normal     LEFT ATRIUM: Size was normal     RIGHT ATRIUM: Size was normal     MITRAL VALVE: There was mild annular calcification  Valve structure was normal  There was normal leaflet separation  DOPPLER: The transmitral velocity was within the normal range  There was no evidence for stenosis  There was mild  regurgitation  AORTIC VALVE: The valve was trileaflet  Leaflets exhibited normal thickness and normal cuspal separation  DOPPLER: Transaortic velocity was within the normal range  There was no evidence for stenosis  There was mild regurgitation  TRICUSPID VALVE: The valve structure was normal  There was normal leaflet separation  DOPPLER: The transtricuspid velocity was within the normal range  There was no evidence for stenosis  There was mild regurgitation  PULMONIC VALVE: Leaflets exhibited normal thickness, no calcification, and normal cuspal separation  DOPPLER: The transpulmonic velocity was within the normal range  There was mild regurgitation  PERICARDIUM: There was no pericardial effusion  The pericardium was normal in appearance  AORTA: The root exhibited normal size      SYSTEM MEASUREMENT TABLES    2D  %FS: 29 2 %  Ao Diam: 3 1 cm  EDV(Teich): 140 8 ml  EF(Teich): 55 6 %  ESV(Teich): 62 5 ml  IVSd: 1 1 cm  LA Area: 20 9 cm2  LA Diam: 3 7 cm  LVEDV MOD A4C: 117 1 ml  LVEF MOD A4C: 50 3 %  LVESV MOD A4C: 58 2 ml  LVIDd: 5 4 cm  LVIDs: 3 8 cm  LVLd A4C: 8 5 cm  LVLs A4C: 7 1 cm  LVPWd: 1 1 cm  RA Area: 17 5 cm2  RVIDd: 3 2 cm  SV MOD A4C: 59 ml  SV(Teich): 78 3 ml    CW  AR Dec Upton: 1 9 m/s2  AR Dec Time: 2208 1 ms  AR PHT: 640 4 ms  AR Vmax: 4 1 m/s  AR maxP 4 mmHg  TR Vmax: 2 5 m/s  TR maxP mmHg    MM  TAPSE: 1 4 cm    PW  AVC: 389 7 ms  E': 0 1 m/s  E/E': 15 3  MV A Anoop: 1 m/s  MV Dec Hancock: 3 4 m/s2  MV DecT: 240 3 ms  MV E Anoop: 0 8 m/s  MV E/A Ratio: 0 8  MV PHT: 69 7 ms  MVA By PHT: 3 2 cm2    [facility] Accredited Echocardiography [facility]    Prepared and electronically signed by    Rosey Valencia MD  Signed 11-Dec-2017 12:09:00       No results found for this or any previous visit  No results found for this or any previous visit  No procedure found  No results found for this or any previous visit  Meds/Allergies   all current active meds have been reviewed  Prescriptions Prior to Admission   Medication    atorvastatin (LIPITOR) 10 mg tablet    fluticasone furoate-vilanterol (BREO ELLIPTA)    acetaminophen (TYLENOL) 500 mg tablet    albuterol (2 5 mg/3 mL) 0 083 % nebulizer solution    albuterol (PROVENTIL HFA,VENTOLIN HFA) 90 mcg/act inhaler    aspirin (ECOTRIN LOW STRENGTH) 81 mg EC tablet    budesonide-formoterol (SYMBICORT) 160-4 5 mcg/act inhaler    buPROPion (WELLBUTRIN SR) 150 mg 12 hr tablet    carBAMazepine (TEGretol) 100 mg chewable tablet    Cholecalciferol (VITAMIN D) 2000 units CAPS    diltiazem (CARDIZEM) 60 mg tablet    divalproex sodium (DEPAKOTE ER) 250 mg 24 hr tablet    fenofibrate micronized (LOFIBRA) 67 MG capsule    ferrous sulfate 325 (65 Fe) mg tablet    furosemide (LASIX) 40 mg tablet    ipratropium-albuterol (DUO-NEB) 0 5-2 5 mg/mL    Levothyroxine Sodium 88 MCG CAPS    metoprolol tartrate (LOPRESSOR) 50 mg tablet    Sennosides-Docusate Sodium (SENNA PLUS PO)    warfarin (COUMADIN) 2 mg tablet            Assessment/Plan:  1  Atrial flutter:   -currently rate controlled/  switch to Cardizem 120 b i d  continue Lopressor 50 mg p o  Q 12 for rate control  -continue Coumadin for anticoagulation     2  Acute on chronic diastolic CHF:  -volume status improved significantly  -check serum creatinine and potassium  -BNP 5492    CXR was not done this admission  -ECHO from December 2017 shows EF is 55-65%, no regional wall motion abnormalities, grade 1 diastolic dysfunction  -serum Cr 1 71 on arrival, last recorded serum creatinine 1 54  Improved with diuresis  -switch to oral Lasix  -low-salt diet, daily weights, I/O     3  CAD S/P PCI:   -no anginal symptoms at this time  Continue aspirin, statin, beta-blocker      4  Hypertension:  Stable, continue present regimen      5  Hyperlipidemia:  Continue statin      Code Status: Level 3 - DNAR and DNI    Patient can be discharged tomorrow if she is stable from cardiac standpoint, if she does not have a established cardiologist she should follow up with me in 1-2 weeks    Counseling / Coordination of Care  Total floor / unit time spent today 30 minutes  Greater than 50% of total time was spent with the patient and / or family counseling and / or coordination of care  ** Please Note: Dragon 360 Dictation voice to text software may have been used in the creation of this document   **

## 2018-04-16 LAB
ANION GAP SERPL CALCULATED.3IONS-SCNC: 6 MMOL/L (ref 4–13)
BUN SERPL-MCNC: 25 MG/DL (ref 5–25)
CALCIUM SERPL-MCNC: 9.1 MG/DL
CHLORIDE SERPL-SCNC: 105 MMOL/L (ref 100–108)
CO2 SERPL-SCNC: 32 MMOL/L (ref 21–32)
CREAT SERPL-MCNC: 1.5 MG/DL (ref 0.6–1.3)
GFR SERPL CREATININE-BSD FRML MDRD: 35 ML/MIN/1.73SQ M
GLUCOSE SERPL-MCNC: 99 MG/DL (ref 65–140)
INR PPP: 1.59 (ref 0.86–1.16)
POTASSIUM SERPL-SCNC: 3.4 MMOL/L (ref 3.5–5.3)
PROTHROMBIN TIME: 19.4 SECONDS (ref 12.1–14.4)
SODIUM SERPL-SCNC: 143 MMOL/L (ref 136–145)

## 2018-04-16 PROCEDURE — G8988 SELF CARE GOAL STATUS: HCPCS

## 2018-04-16 PROCEDURE — G8978 MOBILITY CURRENT STATUS: HCPCS

## 2018-04-16 PROCEDURE — 97110 THERAPEUTIC EXERCISES: CPT

## 2018-04-16 PROCEDURE — 85610 PROTHROMBIN TIME: CPT | Performed by: NURSE PRACTITIONER

## 2018-04-16 PROCEDURE — 97163 PT EVAL HIGH COMPLEX 45 MIN: CPT

## 2018-04-16 PROCEDURE — 80164 ASSAY DIPROPYLACETIC ACD TOT: CPT | Performed by: PSYCHIATRY & NEUROLOGY

## 2018-04-16 PROCEDURE — 99232 SBSQ HOSP IP/OBS MODERATE 35: CPT | Performed by: INTERNAL MEDICINE

## 2018-04-16 PROCEDURE — 80048 BASIC METABOLIC PNL TOTAL CA: CPT | Performed by: NURSE PRACTITIONER

## 2018-04-16 PROCEDURE — 97167 OT EVAL HIGH COMPLEX 60 MIN: CPT

## 2018-04-16 PROCEDURE — 80156 ASSAY CARBAMAZEPINE TOTAL: CPT | Performed by: PSYCHIATRY & NEUROLOGY

## 2018-04-16 PROCEDURE — G8979 MOBILITY GOAL STATUS: HCPCS

## 2018-04-16 PROCEDURE — G8987 SELF CARE CURRENT STATUS: HCPCS

## 2018-04-16 RX ORDER — WARFARIN SODIUM 2 MG/1
2 TABLET ORAL
Status: COMPLETED | OUTPATIENT
Start: 2018-04-16 | End: 2018-04-16

## 2018-04-16 RX ORDER — WARFARIN SODIUM 5 MG/1
5 TABLET ORAL
Status: DISCONTINUED | OUTPATIENT
Start: 2018-04-16 | End: 2018-04-18 | Stop reason: HOSPADM

## 2018-04-16 RX ADMIN — WARFARIN SODIUM 2 MG: 2 TABLET ORAL at 17:28

## 2018-04-16 RX ADMIN — DIVALPROEX SODIUM 500 MG: 250 TABLET, FILM COATED, EXTENDED RELEASE ORAL at 08:05

## 2018-04-16 RX ADMIN — CARBAMAZEPINE 100 MG: 100 TABLET, CHEWABLE ORAL at 08:06

## 2018-04-16 RX ADMIN — FENOFIBRATE 48 MG: 48 TABLET ORAL at 08:05

## 2018-04-16 RX ADMIN — ASPIRIN 81 MG: 81 TABLET, COATED ORAL at 08:05

## 2018-04-16 RX ADMIN — BUPROPION HYDROCHLORIDE 150 MG: 150 TABLET, FILM COATED, EXTENDED RELEASE ORAL at 08:05

## 2018-04-16 RX ADMIN — BUDESONIDE AND FORMOTEROL FUMARATE DIHYDRATE 2 PUFF: 160; 4.5 AEROSOL RESPIRATORY (INHALATION) at 17:26

## 2018-04-16 RX ADMIN — LEVOTHYROXINE SODIUM 88 MCG: 88 TABLET ORAL at 06:01

## 2018-04-16 RX ADMIN — FERROUS SULFATE TAB 325 MG (65 MG ELEMENTAL FE) 325 MG: 325 (65 FE) TAB at 17:26

## 2018-04-16 RX ADMIN — METOPROLOL TARTRATE 50 MG: 50 TABLET ORAL at 08:06

## 2018-04-16 RX ADMIN — CARBAMAZEPINE 100 MG: 100 TABLET, CHEWABLE ORAL at 17:25

## 2018-04-16 RX ADMIN — ATORVASTATIN CALCIUM 10 MG: 10 TABLET, FILM COATED ORAL at 08:06

## 2018-04-16 RX ADMIN — FUROSEMIDE 40 MG: 40 TABLET ORAL at 17:25

## 2018-04-16 RX ADMIN — CARBAMAZEPINE 100 MG: 100 TABLET, CHEWABLE ORAL at 21:35

## 2018-04-16 RX ADMIN — WARFARIN SODIUM 5 MG: 5 TABLET ORAL at 17:26

## 2018-04-16 RX ADMIN — FERROUS SULFATE TAB 325 MG (65 MG ELEMENTAL FE) 325 MG: 325 (65 FE) TAB at 08:06

## 2018-04-16 RX ADMIN — VITAMIN D, TAB 1000IU (100/BT) 2000 UNITS: 25 TAB at 08:05

## 2018-04-16 RX ADMIN — ENOXAPARIN SODIUM 30 MG: 30 INJECTION SUBCUTANEOUS at 11:10

## 2018-04-16 RX ADMIN — BUDESONIDE AND FORMOTEROL FUMARATE DIHYDRATE 2 PUFF: 160; 4.5 AEROSOL RESPIRATORY (INHALATION) at 08:06

## 2018-04-16 RX ADMIN — DILTIAZEM HYDROCHLORIDE 120 MG: 60 CAPSULE, EXTENDED RELEASE ORAL at 08:06

## 2018-04-16 RX ADMIN — FUROSEMIDE 40 MG: 40 TABLET ORAL at 08:05

## 2018-04-16 NOTE — ASSESSMENT & PLAN NOTE
· Patient feels a tightness at the right lower extremity since her last admission here  · Venous duplex negative for acute DVT  · Overall clinically improved  Admission

## 2018-04-16 NOTE — PHYSICAL THERAPY NOTE
Physical Therapy Treatment Note       04/16/18 3265   Pain Assessment   Pain Assessment No/denies pain   Pain Score No Pain   Restrictions/Precautions   Weight Bearing Precautions Per Order No   Braces or Orthoses (none at baseline)   Other Precautions Cognitive; Chair Alarm; Bed Alarm; Fall Risk;Telemetry   General   Chart Reviewed Yes   Response to Previous Treatment Patient with no complaints from previous session  Family/Caregiver Present Yes  (sister in law)   Cognition   Overall Cognitive Status Impaired   Attention Attends with cues to redirect   Orientation Level Oriented to person;Disoriented to place; Disoriented to time;Disoriented to situation   Memory Decreased short term memory;Decreased recall of recent events;Decreased long term memory;Decreased recall of biographical information   Following Commands Follows one step commands with increased time or repetition   Subjective   Subjective "I am hungry"   Endurance Deficit   Endurance Deficit Yes   Endurance Deficit Description SOB   Activity Tolerance   Activity Tolerance Patient limited by fatigue   Nurse Made Aware yes, TARIQ Valladares Headings verbalized pt appropriate to see, made aware of session outcome/recs   Exercises   Heelslides Sitting;10 reps;AROM; Bilateral   Hip Abduction Sitting;10 reps;AROM; Bilateral   Hip Adduction Sitting;10 reps;AROM; Bilateral   Knee AROM Long Arc Quad Sitting;10 reps;AROM; Bilateral   Ankle Pumps Sitting;10 reps;AROM; Bilateral   Marching Sitting;10 reps;AROM; Bilateral   Assessment   Prognosis Good   Problem List Decreased strength;Decreased endurance; Impaired balance;Decreased mobility; Decreased cognition; Impaired judgement;Decreased safety awareness   Assessment Pt seen for PT treatment session this date with interventions consisting of Therapeutic exercise consisting of: AROM 10 B LE in sitting position  Pt agreeable to PT treatment session s/p PT IE, in no apparent distress, A&O x 1 and responsive   Post session: pt returned back to recliner, chair alarm engaged, all needs in reach and RN notified of session findings/recommendations and NSG staff educated/encouraged to mobilize pt A of 1 with RW and ambulate ad neema to tolerance  Continue to recommend STR at time of d/c in order to maximize pt's functional independence and safety w/ mobility  Pt continues to be functioning below baseline level, and remains limited 2* factors listed above and including at risk for falls  PT will continue to see pt while here in order to address the deficits listed above and provide interventions consistent w/ POC in effort to achieve STGs  Barriers to Discharge Inaccessible home environment;Decreased caregiver support   Goals   Patient Goals none expressed   STG Expiration Date 04/26/18   Treatment Day 1   Plan   Treatment/Interventions Functional transfer training;LE strengthening/ROM; Elevations; Therapeutic exercise; Endurance training;Patient/family training;Equipment eval/education; Bed mobility;Gait training;Spoke to nursing;Spoke to case management;OT;Family   Progress Slow progress, cognitive deficits   PT Frequency 5x/wk   Recommendation   Recommendation Short-term skilled PT   Equipment Recommended Walker  (RW)   PT - OK to Discharge Yes  (when medically cleared if to STR)       Rissa Red, PT, DPT    Time of PT treatment session: [id number]

## 2018-04-16 NOTE — PHYSICAL THERAPY NOTE
Physical Therapy Evaluation     Patient's Name: Olamide Almazan    Admitting Diagnosis  Atrial flutter ([place] [address] ) [I48 92]  CHF (congestive heart failure) ([place] [address] ) [I50 9]  Irregular heart beat [I49 9]    Problem List  Patient Active Problem List   Diagnosis    HTN (hypertension)    COPD (chronic obstructive pulmonary disease) ([place] [address] )    CHF (congestive heart failure) ([place] [address] )    Acute respiratory failure with hypoxia (HCC)    Atrial flutter (HCC)    Back pain    Dementia    HLD (hyperlipidemia)    UTI (urinary tract infection)    Seizure disorder ([place] [address] )    RSV infection    Ambulatory dysfunction    Hypernatremia    Stage 3 chronic kidney disease    Bilateral leg edema       Past Medical History  Past Medical History:   Diagnosis Date    Brain aneurysm     CAD (coronary artery disease)     Cardiac disease     [facility]     CKD (chronic kidney disease)     COPD (chronic obstructive pulmonary disease) ([place] [address] )     Dementia     Dementia     Disease of thyroid gland     Hyperlipidemia     Hyperlipidemia     Hypertension     MI (myocardial infarction) ([place] [address] )     Migraine     Renal disorder     Seizures ([place] [address] )     Stroke [facility])        Past Surgical History  Past Surgical History:   Procedure Laterality Date    APPENDECTOMY      BLADDER TUMOR EXCISION      BRAIN SURGERY      CARDIAC SURGERY      CORONARY STENT PLACEMENT      5 stents    EYE SURGERY      MANDIBLE FRACTURE SURGERY      TONSILLECTOMY      TUBAL LIGATION      UTERINE FIBROID SURGERY          04/16/18 1210   Note Type   Note type Eval only   Pain Assessment   Pain Assessment No/denies pain   Pain Score No Pain   Home Living   Type of Home Assisted living  [facility] personal care home)   Home Layout Performs ADLs on one level; One level  (bedroom/apartment; 2nd floor set up)   Additional Comments Pt with recent admission in March 2018 for back pain   Note pt went to [facility] s/p that hospitalization, where pt presented here from  Per CM note on 3/23/18 "Pt lives at [facility] with 10 steps w/railing to reach her apt  She has no problem navigating steps, but gets assistance with ADL's from the [facility] staff  She uses a cane prn in her room and a walker when she is downstairs  She was in [facility] for rehab following a fall 5 years ago  She has a chronic Davis "   Prior Function   Level of [place] Needs assistance with IADLs; Needs assistance with ADLs and functional mobility   Lives With Facility staff   Receives Help From Personal care attendant   ADL Assistance Needs assistance   IADLs Needs assistance   Falls in the last 6 months 1 to 4   Vocational Retired   Comments Note 2* pt's cognitive impairments, pt unreliable historian for PLOF and home environment information  CM will need to follow up  Restrictions/Precautions   Weight Bearing Precautions Per Order No   Braces or Orthoses (none at baseline)   Other Precautions Cognitive; Chair Alarm; Bed Alarm; Fall Risk;Telemetry  (O2 prn)   General   Family/Caregiver Present Yes  (sister in law at end of PT IE)   Cognition   Overall Cognitive Status Impaired   Arousal/Participation Alert   Attention Attends with cues to redirect   Orientation Level Oriented to person;Disoriented to place; Disoriented to time;Disoriented to situation   Memory Decreased short term memory;Decreased recall of recent events;Decreased long term memory;Decreased recall of biographical information   Following Commands Follows one step commands with increased time or repetition   Comments pt agreeable to PT IE   RUE Assessment   RUE Assessment WFL   LUE Assessment   LUE Assessment WFL   RLE Assessment   RLE Assessment WFL  (grossly assessed with functional mobility: at least 3+/5)   LLE Assessment   LLE Assessment WFL  (grossly assessed with functional mobility: at least 3+/5)   Coordination   Movements are Fluid and Coordinated 1   Sensation [facility]   Light Touch   RLE Light Touch Grossly intact   LLE Light Touch Grossly intact   Bed Mobility   Supine to Sit Unable to assess   Additional Comments pt received seated OOB in recliner upon arrival   Transfers   Sit to Stand 4  Minimal assistance   Additional items Assist x 1; Armrests; Increased time required;Verbal cues   Stand to Sit 4  Minimal assistance   Additional items Assist x 1; Armrests; Increased time required;Verbal cues   Additional Comments Note SpO2 on RA s/p gait trial 92% on RA, subjective report of SOB per pt, subsided within 2min seated rest, SpO2 increasing to 95% at that time   Ambulation/Elevation   Gait pattern Decreased foot clearance; Wide JENNYFER; Short stride; Step to   Gait Assistance 4  Minimal assist   Additional items Assist x 1; Tactile cues; Verbal cues  (for RW management)   Assistive Device Rolling walker   Distance 15', 30'   Balance   Static Sitting Good   Dynamic Sitting Fair +   Static Standing Fair +   Dynamic Standing Fair   Ambulatory Fair -   Endurance Deficit   Endurance Deficit Yes   Endurance Deficit Description SOB   Activity Tolerance   Activity Tolerance Patient limited by fatigue   Medical Staff Made Aware yes OT Brandon Rhoades  Spoke to [address] regarding PT recs   Nurse Made Aware yes, RN Mildred Pillai verbalized pt appropriate to see, made aware of session outcome/recs   Assessment   Prognosis Good   Problem List Decreased strength;Decreased endurance; Impaired balance;Decreased mobility; Decreased cognition; Impaired judgement;Decreased safety awareness   Assessment Pt is 71 y o  female seen for PT evaluation on 4/16/2018 s/p admit to [facility] on 4/13/2018 w/ Atrial flutter ([facility] )  Pt presents from cardiologist appt with significant rapid heart rate at 140-150 bpm  Doppler (-) DVT  With with recent hospitalization here in March, went to [facility] following such  PT consulted to assess pt's functional mobility and d/c needs  Order placed for PT eval and tx, w/ up w/ A order   Performed at least 2 patient identifiers during session: Name and wristband  Comorbidities affecting pt's physical performance at time of assessment include: brain aneurysm, CAD, cardiac disease, CHF, CKD, COPD, dementia, disease of thyroid gland, HLD, HTN, MI, migraine, renal disorder, seizures, stroke  PTA, pt was independent w/ all functional mobility w/ RW and resident of [facility]  Note pt's PLOF and home environment unobtainable from pt at time of PT IE 2* impaired cognitive status  Personal factors affecting pt at time of IE include: inaccessible home environment, ambulating w/ assistive device, inability to navigate community distances, inability to navigate level surfaces w/o external assistance, unable to perform dynamic tasks in community, decreased cognition, positive fall history, decreased initiation and engagement and limited insight into impairments  Please find objective findings from PT assessment regarding body systems outlined above with impairments and limitations including weakness, impaired balance, decreased endurance, gait deviations, decreased activity tolerance, decreased safety awareness, impaired judgement, fall risk and decreased cognition, as well as mobility assessment (need for min assist w/ all phases of mobility when usually ambulating independently and need for cueing for mobility technique)  The following objective measures performed on IE also reveal limitations: Barthel Index: 35/100 and Modified Ginny: 4 (moderate/severe disability)  Pt's clinical presentation is currently unstable/unpredictable seen in pt's presentation of need for input for task focus and mobility technique, advanced dementia with limited command following, on telemetry monitoring, unstable medical status and pending further medical management  Pt to benefit from continued PT tx to address deficits as defined above and maximize level of functional independent mobility and consistency   From PT/mobility standpoint, recommendation at time of d/c would be STR pending progress in order to facilitate return to PLOF  Barriers to Discharge Inaccessible home environment;Decreased caregiver support   Goals   Patient Goals none expressed   STG Expiration Date 04/26/18   Short Term Goal #1 In 7-10 days: Increase bilateral LE strength 1/2 grade to facilitate independent mobility, Perform all bed mobility tasks independently to decrease caregiver burden, Perform all transfers independently to improve independence, Ambulate > 150 ft  with least restrictive assistive device independently w/o LOB and w/ normalized gait pattern 100% of the time, Increase all balance 1/2 grade to decrease risk for falls, Tolerate 4 hr OOB to faciliate upright tolerance and Improve Barthel Index score to 50 or greater to facilitate independence   Treatment Day 0   Plan   Treatment/Interventions Functional transfer training;LE strengthening/ROM; Elevations; Therapeutic exercise; Endurance training;Patient/family training;Equipment eval/education; Bed mobility;Gait training;Spoke to nursing;Spoke to case management;OT;Family   PT Frequency 5x/wk   Recommendation   Recommendation Short-term skilled PT   Equipment Recommended Walker  (RW)   PT - OK to Discharge Yes  (when medically cleared if to STR)   Modified Burke Scale   Modified Burke Scale 4   Barthel Index   Feeding 10   Bathing 0   Grooming Score 0   Dressing Score 5   Bladder Score 0  (Davis)   Bowels Score 5   Toilet Use Score 5   Transfers (Bed/Chair) Score 10   Mobility (Level Surface) Score 0   Stairs Score 0   Barthel Index Score 35         Clint Goldberg, PT, DPT

## 2018-04-16 NOTE — PROGRESS NOTES
Progress Note Justus Hernandes [birth year], 71 y o  female MRN: [id number]    Unit/Bed#: -01 Encounter: [id number]    Primary Care Provider: Lorenzo Young DO   Date and time admitted to hospital: 4/13/2018  7:08 PM        * Atrial flutter [facility])   Assessment & Plan    · Patient initially had atrial flutter/atrial fibrillation with rapid ventricle response in the emergency room  Rapid ventricle response had resolved with Cardizem  Patient was just diagnosed with atrial flutter/atrial fibrillation last month here at [address]  · Discussion with Cardiology patient transition to long-acting Cardizem yesterday and oral Lasix   · Per discussion with Cardiology stable from their perspective for discharge  · Discussion with daughter patient is typically seen by a cardiologist and [place] however patient is been relocated to [facility] and daughter would like a cardiologist closer  Discussion with Dr Dayana Glynn he will follow with this patient as an outpatient  · Continue with Coumadin  Patient's cardiologist in [place] was recommending other agents then Coumadin will defer this to Cardiology as an outpatient  · Patient will need a follow-up in the next 1-2 weeks with her cardiologist patient does not have a cardiologist she is recommended to follow up with Dr Dayana Glynn  · TSH was normal last March 22nd of this year  · Cardiology further felt patient did have an acute on chronic diastolic CHF exacerbation requiring IV diuretics with transition to oral Lasix  Stage 3 chronic kidney disease   Assessment & Plan    · Review of patient's BMPs, revealed that the last 1 was 1 48   · Presently, it is 1 50   · Monitor patient's kidney function  · Avoid nephrotoxins  · Avoid hypotension          Hypernatremia   Assessment & Plan    · Present on admission mild  · Resolved        CHF (congestive heart failure) ([facility])   Assessment & Plan    · Acute on Chronic diastolic congestive heart failure requiring IV diuresis patient responded well transition and was transition to oral Lasix today  · Continue cardiovascular and heart failure medications  · Creatinine showed initial improvement and has remained stable        COPD (chronic obstructive pulmonary disease) ([facility])   Assessment & Plan    · No exacerbation  · Continue p r n  inhaler         Bilateral leg edema   Assessment & Plan    · Patient feels a tightness at the right lower extremity since her last admission here  · Venous duplex negative for acute DVT  · Overall clinically improved  Dementia   Assessment & Plan    · Patient has short-term memory loss  · Supportive care  VTE Pharmacologic Prophylaxis:   Pharmacologic: Warfarin (Coumadin) bridge with Lovenox due to subtherapeutic INR  Mechanical VTE Prophylaxis in Place: Yes    Patient Centered Rounds: I have performed bedside rounds with nursing staff today  Discussions with Specialists or Other Care Team Provider:  Cardiology    Education and Discussions with Family / Patient:  Daughter    Time Spent for Care: 35 minutes  More than 50% of total time spent on counseling and coordination of care as described above  Current Length of Stay: 3 day(s)    Current Patient Status: Inpatient   Certification Statement: The patient will continue to require additional inpatient hospital stay due to Pending PT evaluation safe discharge plan    Discharge Plan:  Likely later today or tomorrow    Code Status: Level 3 - DNAR and DNI      Subjective:   Patient remains pleasantly confused  Discussion with daughter update given need to get PT evaluation if PT feels the patient can return to her personal care home patient can discharge back  Daughter made aware that her INR for her Coumadin is subtherapeutic and patient will need injectable Lovenox given her arrhythmia      Objective:     Vitals:   Temp (24hrs), Av 9 °F (36 6 °C), Min:97 4 °F (36 3 °C), Max:98 2 °F (36 8 °C)    HR:  Yesi Singer 66  Resp:  [18] 18  BP: (106-135)/(56-95) 122/63  SpO2:  [91 %-95 %] 92 %  Body mass index is 35 78 kg/m²  Input and Output Summary (last 24 hours): Intake/Output Summary (Last 24 hours) at 04/16/18 1016  Last data filed at 04/16/18 0916   Gross per 24 hour   Intake              400 ml   Output             1400 ml   Net            -1000 ml       Physical Exam:     Physical Exam   HENT:   Head: Normocephalic and atraumatic  Eyes: Conjunctivae and EOM are normal    Neck: Normal range of motion  Cardiovascular: Normal rate  An irregular rhythm present  Exam reveals distant heart sounds  Pulmonary/Chest: Effort normal and breath sounds normal    Abdominal: Soft  Bowel sounds are normal    Musculoskeletal: Normal range of motion  She exhibits no edema  Neurological: She is alert  Pleasantly confused alert to self   Skin: Skin is warm and dry  Psychiatric: She has a normal mood and affect  Her behavior is normal          Additional Data:     Labs:      Results from last 7 days  Lab Units 04/14/18  0516 04/13/18 2022   WBC Thousand/uL 5 71 6 11   HEMOGLOBIN g/dL 10 7* 11 7   HEMATOCRIT % 34 0* 36 6   PLATELETS Thousands/uL 121* 139*   NEUTROS PCT %  --  42*   LYMPHS PCT %  --  41   MONOS PCT %  --  9   EOS PCT %  --  7*       Results from last 7 days  Lab Units 04/16/18  0549  04/13/18 2022   SODIUM mmol/L 143  < > 146*   POTASSIUM mmol/L 3 4*  < > 3 7   CHLORIDE mmol/L 105  < > 108   CO2 mmol/L 32  < > 30   BUN mg/dL 25  < > 25   CREATININE mg/dL 1 50*  < > 1 71*   CALCIUM mg/dL 9 1  < > 9 2   TOTAL PROTEIN g/dL  --   --  7 2   BILIRUBIN TOTAL mg/dL  --   --  0 30   ALK PHOS U/L  --   --  57   ALT U/L  --   --  20   AST U/L  --   --  18   GLUCOSE RANDOM mg/dL 99  < > 125   < > = values in this interval not displayed  Results from last 7 days  Lab Units 04/15/18  0013   INR  1 48*       * I Have Reviewed All Lab Data Listed Above    * Additional Pertinent Lab Tests Reviewed: Labs Pending At The Time Of This Note        Recent Cultures (last 7 days):           Last 24 Hours Medication List:     Current Facility-Administered Medications:  acetaminophen 488 mg Oral Q6H PRN Augustin Herbert MD   aspirin 81 mg Oral Daily Augustin Herbert MD   atorvastatin 10 mg Oral Daily Augustin Herbert MD   budesonide-formoterol 2 puff Inhalation BID Augustin Herbert MD   buPROPion 150 mg Oral Daily Augustin Herbert MD   carBAMazepine 100 mg Oral TID Augustin Herbert MD   cholecalciferol 2,000 Units Oral Daily Augustin Herbert MD   diltiazem 120 mg Oral Q12H Jean Paul Smith MD   divalproex sodium 500 mg Oral Daily Augustin Herbert MD   fenofibrate 48 mg Oral Daily Augustin Herbert MD   ferrous sulfate 325 mg Oral BID Augustin Herbert MD   furosemide 40 mg Oral BID (diuretic) Ramiro Carrillo MD   levalbuterol 1 25 mg Nebulization Q6H PRN Augustin Herbert MD   levothyroxine 88 mcg Oral QAM Augustin Herbert MD   metoprolol 5 mg Intravenous Q6H PRN Augustin Herbert MD   metoprolol tartrate 50 mg Oral Q12H [address] Augustin Herbert MD   ondansetron 4 mg Intravenous Q6H PRN Augustin Herbert MD   senna-docusate sodium 1 tablet Oral Daily PRN Augustin Herbert MD   warfarin 2 5 mg Oral Daily (warfarin) Cassy Bansal MD        Today, Patient Was Seen By: TANVI Barksdale    ** Please Note: Dictation voice to text software may have been used in the creation of this document   **

## 2018-04-16 NOTE — PLAN OF CARE
Problem: PHYSICAL THERAPY ADULT  Goal: Performs mobility at highest level of function for planned discharge setting  See evaluation for individualized goals  Treatment/Interventions: Functional transfer training, LE strengthening/ROM, Elevations, Therapeutic exercise, Endurance training, Patient/family training, Equipment eval/education, Bed mobility, Gait training, Spoke to nursing, Spoke to case management, OT, Family  Equipment Recommended: Yojana Landis (RW)       See flowsheet documentation for full assessment, interventions and recommendations  Prognosis: Good  Problem List: Decreased strength, Decreased endurance, Impaired balance, Decreased mobility, Decreased cognition, Impaired judgement, Decreased safety awareness  Assessment: Pt is 71 y o  female seen for PT evaluation on 4/16/2018 s/p admit to [facility] on 4/13/2018 w/ Atrial flutter ([facility] [address] )  Pt presents from cardiologist appt with significant rapid heart rate at 140-150 bpm  Doppler (-) DVT  With with recent hospitalization here in March, went to [facility] following such  PT consulted to assess pt's functional mobility and d/c needs  Order placed for PT eval and tx, w/ up w/ A order  Performed at least 2 patient identifiers during session: Name and wristband  Comorbidities affecting pt's physical performance at time of assessment include: brain aneurysm, CAD, cardiac disease, CHF, CKD, COPD, dementia, disease of thyroid gland, HLD, HTN, MI, migraine, renal disorder, seizures, stroke  PTA, pt was independent w/ all functional mobility w/ RW and resident of [facility]  Note pt's PLOF and home environment unobtainable from pt at time of PT IE 2* impaired cognitive status   Personal factors affecting pt at time of IE include: inaccessible home environment, ambulating w/ assistive device, inability to navigate community distances, inability to navigate level surfaces w/o external assistance, unable to perform dynamic tasks in community, decreased cognition, positive fall history, decreased initiation and engagement and limited insight into impairments  Please find objective findings from PT assessment regarding body systems outlined above with impairments and limitations including weakness, impaired balance, decreased endurance, gait deviations, decreased activity tolerance, decreased safety awareness, impaired judgement, fall risk and decreased cognition, as well as mobility assessment (need for min assist w/ all phases of mobility when usually ambulating independently and need for cueing for mobility technique)  The following objective measures performed on IE also reveal limitations: Barthel Index: 35/100 and Modified Garrett: 4 (moderate/severe disability)  Pt's clinical presentation is currently unstable/unpredictable seen in pt's presentation of need for input for task focus and mobility technique, advanced dementia with limited command following, on telemetry monitoring, unstable medical status and pending further medical management  Pt to benefit from continued PT tx to address deficits as defined above and maximize level of functional independent mobility and consistency  From PT/mobility standpoint, recommendation at time of d/c would be STR pending progress in order to facilitate return to PLOF  Barriers to Discharge: Inaccessible home environment, Decreased caregiver support     Recommendation: Short-term skilled PT     PT - OK to Discharge: Yes (when medically cleared if to STR)    See flowsheet documentation for full assessment

## 2018-04-16 NOTE — PLAN OF CARE
Problem: OCCUPATIONAL THERAPY ADULT  Goal: Performs self-care activities at highest level of function for planned discharge setting  See evaluation for individualized goals  Treatment Interventions: ADL retraining, Functional transfer training, UE strengthening/ROM, Endurance training, Patient/family training, Compensatory technique education, Continued evaluation, Energy conservation, Fine motor coordination activities, Equipment evaluation/education          See flowsheet documentation for full assessment, interventions and recommendations  Limitation: Decreased ADL status, Decreased UE ROM, Decreased Safe judgement during ADL, Decreased endurance, Decreased self-care trans, Decreased high-level ADLs  Prognosis: Good  Assessment: Patient is a 71 y o  female seen for OT evaluation s/p admit to [address] [place] [address] on 4/13/2018 w/Atrial flutter ([address] )  Patient  was sent here from the cardiologist's office due to rapid heart rate  Commorbidities affecting patient's functional performance at time of assessment include: Acute on chronic diastolic Congestive heart failure, Atrial Flutter, HTN,, COPD,  Prior to admission, Patient is a poor historian, chart review indicates, patient lives at [facility] [place], was at [facility] for STR PTA , She requires assist with ADL's and ambulation due to dementia  She does use a walker to ambulate  Daughter lives locally and is supportive  Personal factors affecting patient at time of initial evaluation include: limited insight into deficits, decreased initiation and engagement and difficulty performing ADLs  Upon evaluation, patient requires minimal  and moderate assist for UB ADLs, moderate assist for LB ADLs, transfers and functional ambulation in room and bathroom with minimal  assist, with the use of Rolling Walker   Occupational performance is affected by the following deficits: anxiety, orientation, attention span, impaired gross motor coordination, dynamic sit/ stand balance deficit with poor standing tolerance time for self care and functional mobility, decreased activity tolerance, impaired memory, impaired problem solving, decreased safety awareness and postural control and postural alignment deficit, requiring external assistance to complete transitional movements  Therapist completed  extensive additional review of medical records and additional review of physical, cognitive or psychosocial history, clinical examination identifying 5 or more performance deficits, clinical decision making of a high complexity , consistent with a high complexity level evaluation  Patient to benefit from continued Occupational Therapy treatment while in the hospital to address deficits as defined above and maximize level of functional independence with ADLs and functional mobility  Occupational Performance areas to address include: grooming , bathing/ shower, dressing, toilet hygiene, transfer to all surfaces, functional mobility, IADLs: safety procedures, Leisure Participation and Social participation  From OT standpoint, recommendation at time of d/c would be Short Term Rehab         OT Discharge Recommendation: Short Term Rehab  OT - OK to Discharge: Yes (STR when medically cleared)

## 2018-04-16 NOTE — PROGRESS NOTES
General Cardiology   Progress Note   Yasmine Mcfarland 71 y o  female MRN: [id number]  Unit/Bed#: -01 Encounter: [id number]      SUBJECTIVE:   No significant events overnight  HR improved  Denies chest pain, SOB, palpitations, leg swelling, dizziness, syncope  OBJECTIVE:   Vitals:  Vitals:    04/16/18 0723   BP: 122/63   Pulse: 66   Resp: 18   Temp: 97 8 °F (36 6 °C)   SpO2: 92%     Body mass index is 35 78 kg/m²  Systolic (67AGA), FGM:507 , Min:106 , NBY:624     Diastolic (12OPH), NGJ:83, Min:56, Max:95      Intake/Output Summary (Last 24 hours) at 04/16/18 1103  Last data filed at 04/16/18 0916   Gross per 24 hour   Intake              400 ml   Output             1400 ml   Net            -1000 ml     Weight (last 2 days)     Date/Time   Weight    04/16/18 0600  83 1 (183 2)    04/15/18 0600  84 (185 19)    04/14/18 0600  84 8 (186 95)              Telemetry Review: A Flutter, HR controlled  PHYSICAL EXAMS:  General:  Patient is not in acute distress, laying in the bed comfortably, awake, alert responding to commands  Head: Normocephalic, Atraumatic  HEENT:  Both pupils normal-size atraumatic, normocephalic, nonicteric  Neck:  JVP not raised  Trachea central  Respiratory:  Bronchovascular breathing all over the chest without any accompaniment  Cardiovascular:  +irregular  +trace edema  No murmurs rubs or gallops  GI:  Abdomen soft nontender   Liver and spleen normal size  Lymphatic:  No cervical or inguinal lymphadenopathy  Neurologic:  Patient is awake alert, responding to command, well-oriented to time and place and person moving     LABORATORY RESULTS:    Results from last 7 days  Lab Units 04/13/18 2022   TROPONIN I ng/mL <0 02       CBC with diff:   Results from last 7 days  Lab Units 04/14/18  0516 04/13/18 2022   WBC Thousand/uL 5 71 6 11   HEMOGLOBIN g/dL 10 7* 11 7   HEMATOCRIT % 34 0* 36 6   MCV fL 105* 105*   PLATELETS Thousands/uL 121* 139*   MCH pg 33 0 33 5   MCHC g/dL 31 5 32 0   RDW % 13 2 13 2   MPV fL 12 1 12 1   NRBC AUTO /100 WBCs  --  0       CMP:  Results from last 7 days  Lab Units 18  0549 04/15/18  1605 18  0517 18   SODIUM mmol/L 143 142 145 146*   POTASSIUM mmol/L 3 4* 3 7 3 5 3 7   CHLORIDE mmol/L 105 103 110* 108   CO2 mmol/L 32 31 30 30   ANION GAP mmol/L 6 8 5 8   BUN mg/dL 25 25 24 25   CREATININE mg/dL 1 50* 1 51* 1 54* 1 71*   GLUCOSE RANDOM mg/dL 99 100 101 125   CALCIUM mg/dL 9 1 9 8 9 0 9 2   AST U/L  --   --   --  18   ALT U/L  --   --   --  20   ALK PHOS U/L  --   --   --  57   TOTAL PROTEIN g/dL  --   --   --  7 2   BILIRUBIN TOTAL mg/dL  --   --   --  0 30   EGFR ml/min/1 73sq m 35 35 34 30       BMP:  Results from last 7 days  Lab Units 18  0549 04/15/18  1605 18  0517   SODIUM mmol/L 143 142 145   POTASSIUM mmol/L 3 4* 3 7 3 5   CHLORIDE mmol/L 105 103 110*   CO2 mmol/L 32 31 30   BUN mg/dL 25 25 24   CREATININE mg/dL 1 50* 1 51* 1 54*   GLUCOSE RANDOM mg/dL 99 100 101   CALCIUM mg/dL 9 1 9 8 9 0         Results from last 7 days  Lab Units 18   NT-PRO BNP pg/mL 5,492*        Results from last 7 days  Lab Units 18  0517   MAGNESIUM mg/dL 2 0               Results from last 7 days  Lab Units 04/15/18  0013 18   INR  1 48* 1 71*       Lipid Profile:   No results found for: CHOL  No results found for: HDL  No results found for: LDLCALC  No results found for: TRIG    Cardiac testing:  Results for orders placed during the hospital encounter of 12/10/17   Echo complete with contrast if indicated    Narrative [address] [place] [address] [phone]    Transthoracic Echocardiogram  2D, M-mode, Doppler, and Color Doppler    Study date:  11-Dec-2017    Patient: Rachel Ingram  MR number: [id number]  Account number: [de-identified]  : 1945  Age: 71 years  Gender: Female  Status: Inpatient  Location: Bedside  Height: 60 in  Weight: 192 lb  BP: 99/ 50 mmHg    Indications: Shortness of breath  Diagnoses: R06 00 - Dyspnea, unspecified, R06 02 - Shortness of breath    Sonographer:  Carly Guo, [facility]  Interpreting Physician:  Devaughn Ortega MD  Referring Physician:  Vane Caballero PA-C  Group:  [facility]    SUMMARY    LEFT VENTRICLE:  Systolic function was normal  Ejection fraction was estimated in the range of 55 % to 65 %  There were no regional wall motion abnormalities  There was mild concentric hypertrophy  Doppler parameters were consistent with abnormal left ventricular relaxation (grade 1 diastolic dysfunction)  MITRAL VALVE:  There was mild annular calcification  There was mild regurgitation  AORTIC VALVE:  There was mild regurgitation  TRICUSPID VALVE:  There was mild regurgitation  PULMONIC VALVE:  There was mild regurgitation  HISTORY: PRIOR HISTORY: Elevated trponin, Dementia, Seizures, Stroke, Myocardial infarction  Congestive heart failure  Risk factors: hypertension and hypercholesterolemia  Chronic lung disease  PROCEDURE: The procedure was performed at the bedside  This was a routine study  The transthoracic approach was used  The study included complete 2D imaging, M-mode, complete spectral Doppler, and color Doppler  The heart rate was 68 bpm,  at the start of the study  Images were obtained from the parasternal, apical, subcostal, and suprasternal notch acoustic windows  Echocardiographic views were limited due to poor acoustic window availability, decreased penetration, and  lung interference  This was a technically difficult study  LEFT VENTRICLE: Size was normal  Systolic function was normal  Ejection fraction was estimated in the range of 55 % to 65 %  There were no regional wall motion abnormalities  Wall thickness was normal  There was mild concentric  hypertrophy   DOPPLER: Doppler parameters were consistent with abnormal left ventricular relaxation (grade 1 diastolic dysfunction)  RIGHT VENTRICLE: The size was normal  Systolic function was normal  Wall thickness was normal     LEFT ATRIUM: Size was normal     RIGHT ATRIUM: Size was normal     MITRAL VALVE: There was mild annular calcification  Valve structure was normal  There was normal leaflet separation  DOPPLER: The transmitral velocity was within the normal range  There was no evidence for stenosis  There was mild  regurgitation  AORTIC VALVE: The valve was trileaflet  Leaflets exhibited normal thickness and normal cuspal separation  DOPPLER: Transaortic velocity was within the normal range  There was no evidence for stenosis  There was mild regurgitation  TRICUSPID VALVE: The valve structure was normal  There was normal leaflet separation  DOPPLER: The transtricuspid velocity was within the normal range  There was no evidence for stenosis  There was mild regurgitation  PULMONIC VALVE: Leaflets exhibited normal thickness, no calcification, and normal cuspal separation  DOPPLER: The transpulmonic velocity was within the normal range  There was mild regurgitation  PERICARDIUM: There was no pericardial effusion  The pericardium was normal in appearance  AORTA: The root exhibited normal size      SYSTEM MEASUREMENT TABLES    2D  %FS: 29 2 %  Ao Diam: 3 1 cm  EDV(Teich): 140 8 ml  EF(Teich): 55 6 %  ESV(Teich): 62 5 ml  IVSd: 1 1 cm  LA Area: 20 9 cm2  LA Diam: 3 7 cm  LVEDV MOD A4C: 117 1 ml  LVEF MOD A4C: 50 3 %  LVESV MOD A4C: 58 2 ml  LVIDd: 5 4 cm  LVIDs: 3 8 cm  LVLd A4C: 8 5 cm  LVLs A4C: 7 1 cm  LVPWd: 1 1 cm  RA Area: 17 5 cm2  RVIDd: 3 2 cm  SV MOD A4C: 59 ml  SV(Teich): 78 3 ml    CW  AR Dec [place]: 1 9 m/s2  AR Dec Time: 2208 1 ms  AR PHT: 640 4 ms  AR Vmax: 4 1 m/s  AR maxP 4 mmHg  TR Vmax: 2 5 m/s  TR maxP mmHg    MM  TAPSE: 1 4 cm    PW  AVC: 389 7 ms  E': 0 1 m/s  E/E': 15 3  MV A Anoop: 1 m/s  MV Dec [place]: 3 4 m/s2  MV DecT: 240 3 ms  MV E Anoop: 0 8 m/s  MV E/A Ratio: 0 8  MV PHT: 69 7 ms  MVA By PHT: 3 2 cm2    [facility]    Prepared and electronically signed by    Darek Beltre MD  Signed 11-Dec-2017 12:09:00         Meds/Allergies   all current active meds have been reviewed and current meds:   Current Facility-Administered Medications   Medication Dose Route Frequency    acetaminophen (TYLENOL) tablet 488 mg  488 mg Oral Q6H PRN    aspirin (ECOTRIN LOW STRENGTH) EC tablet 81 mg  81 mg Oral Daily    atorvastatin (LIPITOR) tablet 10 mg  10 mg Oral Daily    budesonide-formoterol (SYMBICORT) 160-4 5 mcg/act inhaler 2 puff  2 puff Inhalation BID    buPROPion (WELLBUTRIN SR) 12 hr tablet 150 mg  150 mg Oral Daily    carBAMazepine (TEGretol) chewable tablet 100 mg  100 mg Oral TID    cholecalciferol (VITAMIN D3) tablet 2,000 Units  2,000 Units Oral Daily    diltiazem (CARDIZEM SR) 12 hr capsule 120 mg  120 mg Oral Q12H [facility]    divalproex sodium (DEPAKOTE ER) 24 hr tablet 500 mg  500 mg Oral Daily    enoxaparin (LOVENOX) subcutaneous injection 30 mg  30 mg Subcutaneous Q24H [facility]    fenofibrate (TRICOR) tablet 48 mg  48 mg Oral Daily    ferrous sulfate tablet 325 mg  325 mg Oral BID    furosemide (LASIX) tablet 40 mg  40 mg Oral BID (diuretic)    levalbuterol (XOPENEX) inhalation solution 1 25 mg  1 25 mg Nebulization Q6H PRN    levothyroxine tablet 88 mcg  88 mcg Oral QAM    metoprolol (LOPRESSOR) injection 5 mg  5 mg Intravenous Q6H PRN    metoprolol tartrate (LOPRESSOR) tablet 50 mg  50 mg Oral Q12H [facility]    ondansetron (ZOFRAN) injection 4 mg  4 mg Intravenous Q6H PRN    senna-docusate sodium (SENOKOT S) 8 6-50 mg per tablet 1 tablet  1 tablet Oral Daily PRN    warfarin (COUMADIN) tablet 2 mg  2 mg Oral Once (warfarin)    warfarin (COUMADIN) tablet 5 mg  5 mg Oral Daily (warfarin)     Prescriptions Prior to Admission   Medication    atorvastatin (LIPITOR) 10 mg tablet    fluticasone furoate-vilanterol (BREO ELLIPTA)    acetaminophen (TYLENOL) 500 mg tablet    albuterol (2 5 mg/3 mL) 0 083 % nebulizer solution    albuterol (PROVENTIL HFA,VENTOLIN HFA) 90 mcg/act inhaler    aspirin (ECOTRIN LOW STRENGTH) 81 mg EC tablet    budesonide-formoterol (SYMBICORT) 160-4 5 mcg/act inhaler    buPROPion (WELLBUTRIN SR) 150 mg 12 hr tablet    carBAMazepine (TEGretol) 100 mg chewable tablet    Cholecalciferol (VITAMIN D) 2000 units CAPS    diltiazem (CARDIZEM) 60 mg tablet    divalproex sodium (DEPAKOTE ER) 250 mg 24 hr tablet    fenofibrate micronized (LOFIBRA) 67 MG capsule    ferrous sulfate 325 (65 Fe) mg tablet    furosemide (LASIX) 40 mg tablet    ipratropium-albuterol (DUO-NEB) 0 5-2 5 mg/mL    Levothyroxine Sodium 88 MCG CAPS    metoprolol tartrate (LOPRESSOR) 50 mg tablet    Sennosides-Docusate Sodium (SENNA PLUS PO)    warfarin (COUMADIN) 2 mg tablet          ASSESSMENT & PLAN   1  Atrial flutter:  Continue Cardizem for rate control, continue Lopressor for rate control, continue Coumadin for anticoagulation  HR improved  2   Acute on chronic diastolic Congestive heart failure:  Euvolemic  Echo from December 2017 shows EF 55-65%, no regional wall motion abnormalities, grade 1 diastolic dysfunction  Switched to p o  Lasix yesterday  Continue p o  Lasix  Continue Lopressor  Low-salt diet, daily weights, I/O     3   CAD S/P PCI:   -no anginal symptoms at this time   Continue aspirin, statin, beta-blocker      4   Hypertension:  Stable, continue present regimen      5   Hyperlipidemia:  Continue statin  Santiago Ocasio PA-C  4/16/2018,11:03 AM    Portions of the record may have been created with voice recognition software   Occasional wrong word or "sound a like" substitutions may have occurred due to the inherent limitations of voice recognition software   Read the chart carefully and recognize, using context, where substitutions have occurred

## 2018-04-16 NOTE — ASSESSMENT & PLAN NOTE
· Patient initially had atrial flutter/atrial fibrillation with rapid ventricle response in the emergency room  Rapid ventricle response had resolved with Cardizem  Patient was just diagnosed with atrial flutter/atrial fibrillation last month here at [facility]  · Discussion with Cardiology patient transition to long-acting Cardizem yesterday and oral Lasix   · Per discussion with Cardiology stable from their perspective for discharge  · Discussion with daughter patient is typically seen by a cardiologist and [place] however patient is been relocated to [place] and daughter would like a cardiologist closer  Discussion with Dr Dayana Glynn he will follow with this patient as an outpatient  · Continue with Coumadin  Patient's cardiologist in [place] was recommending other agents then Coumadin will defer this to Cardiology as an outpatient  · Patient will need a follow-up in the next 1-2 weeks with her cardiologist patient does not have a cardiologist she is recommended to follow up with Dr Dayana Glynn  · TSH was normal last March 22nd of this year  · Cardiology further felt patient did have an acute on chronic diastolic CHF exacerbation requiring IV diuretics with transition to oral Lasix

## 2018-04-16 NOTE — PHYSICIAN ADVISOR
This patient is a 71 y o  y/o female who is admitted to the hospital for Irregular Heart Beat (Patient states she is coming from her cardiologist office, where her cardiologist told her to come for furthur evaluation of A-fib  Patient states she was here in our hospital on the 22nd March with same problem  )   The patient presented to the ED on 4/13/18 at 1901 and was admitted to the hospital on 4/13/2018 1908  History of Present Illness includes: the patient had a prior admission from 3/22-3/30 for RSV infection, new onset atrial flutter and acute hypoxemic respiratory failure  The patient presented to the ER with a rapid heart rate  In the ER, the patient was found to be in rapid atrial fibrillation  Vital signs in the ER are as follows   ED Triage Vitals   Temperature Pulse Respirations Blood Pressure SpO2   04/13/18 1913 04/13/18 1913 04/13/18 1913 04/13/18 1913 04/13/18 1913   98 °F (36 7 °C) (!) 131 20 116/77 92 %      Temp Source Heart Rate Source Patient Position - Orthostatic VS BP Location FiO2 (%)   04/13/18 1913 04/13/18 1913 04/13/18 1913 04/13/18 1913 --   Oral Monitor Sitting Right arm       Pain Score       04/14/18 0000       No Pain         On exam the patient has an irregular rhythm and there is bilateral leg edema  Hgb is 11 7, Na is 146, Cr is 1 71  The patient is being admitted with rapid atrial fibrillation  The plan of care includes cardiology consultation, repeat labs, IV Lasix for acute on chronic CHF  This patient is appropriate for INPATIENT admission as her length of stay is expected to be at least 2 midnights  This admission is UNRELATED to her prior admission as she was treated and discharged in stable condition

## 2018-04-16 NOTE — PLAN OF CARE
Problem: PHYSICAL THERAPY ADULT  Goal: Performs mobility at highest level of function for planned discharge setting  See evaluation for individualized goals  Treatment/Interventions: Functional transfer training, LE strengthening/ROM, Elevations, Therapeutic exercise, Endurance training, Patient/family training, Equipment eval/education, Bed mobility, Gait training, Spoke to nursing, Spoke to case management, OT, Family  Equipment Recommended: Kortney Pink (KANDACE)       See flowsheet documentation for full assessment, interventions and recommendations  Outcome: Progressing  Prognosis: Good  Problem List: Decreased strength, Decreased endurance, Impaired balance, Decreased mobility, Decreased cognition, Impaired judgement, Decreased safety awareness  Assessment: Pt seen for PT treatment session this date with interventions consisting of Therapeutic exercise consisting of: AROM 10 B LE in sitting position  Pt agreeable to PT treatment session s/p PT IE, in no apparent distress, A&O x 1 and responsive  Post session: pt returned back to recliner, chair alarm engaged, all needs in reach and RN notified of session findings/recommendations and NSG staff educated/encouraged to mobilize pt A of 1 with RW and ambulate ad neema to tolerance  Continue to recommend STR at time of d/c in order to maximize pt's functional independence and safety w/ mobility  Pt continues to be functioning below baseline level, and remains limited 2* factors listed above and including at risk for falls  PT will continue to see pt while here in order to address the deficits listed above and provide interventions consistent w/ POC in effort to achieve STGs  Barriers to Discharge: Inaccessible home environment, Decreased caregiver support     Recommendation: Short-term skilled PT     PT - OK to Discharge: Yes (when medically cleared if to STR)    See flowsheet documentation for full assessment

## 2018-04-16 NOTE — SOCIAL WORK
Call placed to [facility] to determine what is required for patient to return to [facility] and they informed me that patient could not return to [facility] but must return to [facility]  Call placed to daughter and she was told about [facility] not accepting her Mother  She is going to call them and then call me back  Mitesh BERG will be made aware of this change

## 2018-04-16 NOTE — SOCIAL WORK
Spoke to [facility] home and they stated that patient could not return because there is no nurse to give her Lovenox every day  Called Larry the homecare agency and they stated they would go to [facility] and administer the Lovenox every day for 10 days  Called Isaiah[facility] back and they are going to have the  evaluate her for readmission  Daughter made aware of all this information

## 2018-04-16 NOTE — SOCIAL WORK
Spoke with the daughter on the phone and she states patient came from [facility] but when not there does live at [facility]  She requires assist with ADL's and ambulation due to dementia  She does use a walker to ambulate  She has had Homecare in the past which was [place]  Referral sent  Her daughter is her POA  The DC plan will be to go back to [place] pending PT manjula  Will call [place] to evaluate  She will need Lovenox for 10 days post DC  Will confirm this si acceptable by [facility]  CM reviewed discharge planning process including the following: identifying help at home, patient preference for discharge planning needs, pharmacy preference, and availability of treatment team to discuss questions or concerns patient and/or family may have regarding understanding medications and recognizing signs and symptoms once discharged  CM also encouraged patient to follow up with all recommended appointments after discharge  Patient advised of importance for patient and family to participate in managing patients medical well being

## 2018-04-16 NOTE — ASSESSMENT & PLAN NOTE
· Acute on Chronic diastolic congestive heart failure requiring IV diuresis patient responded well transition and was transition to oral Lasix today  · Continue cardiovascular and heart failure medications    · Creatinine showed initial improvement and has remained stable

## 2018-04-16 NOTE — ASSESSMENT & PLAN NOTE
· Review of patient's BMPs, revealed that the last 1 was 1 48   · Presently, it is 1 50   · Monitor patient's kidney function  · Avoid nephrotoxins  · Avoid hypotension

## 2018-04-16 NOTE — OCCUPATIONAL THERAPY NOTE
Occupational Therapy Evaluation        Patient Name: Ekta Diane  PCVUF'I Date: 4/16/2018 04/16/18 1153   Note Type   Note type Eval only   Restrictions/Precautions   Weight Bearing Precautions Per Order No   Braces or Orthoses (none at baseline)   Other Precautions Chair Alarm; Bed Alarm; Fall Risk;O2  (O2 PRN)   Pain Assessment   Pain Assessment No/denies pain   Pain Score No Pain   Home Living   Type of Home Other (Comment); Assisted living  ([facility], at [facility])   Home Layout One level;Performs ADLs on one level  (bedroom/ apartment )   [address] [facility]   Prior Function   Level of [place] Needs assistance with IADLs; Needs assistance with ADLs and functional mobility   Lives With Facility staff   Receives Help From Personal care attendant   ADL Assistance Needs assistance   IADLs Needs assistance   Lifestyle   Autonomy Patient is a poor historian, chart review indicates, patient lives at [facility], was at [facility] for STR PTA , She requires assist with ADL's and ambulation due to dementia  She does use a walker to ambulate  Daughter lives locally and is supportive  Reciprocal Relationships Supportive Family   Psychosocial   Psychosocial (WDL) WDL   ADL   Eating Assistance 5  Supervision/Setup   Grooming Assistance 4  Minimal Assistance   UB Bathing Assistance 3  Moderate Assistance   LB Bathing Assistance 2  Maximal Assistance   UB Dressing Assistance 4  Minimal [address] 3  Moderate [address]  3  Moderate Assistance   Functional Assistance 3  Moderate Assistance   Bed Mobility   Additional Comments received patient OOB to recliner Chair   Transfers   Sit to Stand 4  Minimal assistance   Additional items Assist x 1; Increased time required;Verbal cues   Stand to Sit 4  Minimal assistance   Additional items Assist x 1; Increased time required;Verbal cues   Toilet transfer 4  Minimal assistance Additional items Assist x 1; Increased time required;Verbal cues   Functional Mobility   Functional Mobility 4  Minimal assistance   Additional items Rolling walker   Balance   Static Sitting Good   Dynamic Sitting Fair +   Static Standing Fair +   Dynamic Standing Fair   Activity Tolerance   Activity Tolerance Patient limited by fatigue   Nurse Made Aware TARIQ Coulter verbalized patient appropriate for therapy   RUE Assessment   RUE Assessment WFL   LUE Assessment   LUE Assessment WFL   Hand Function   Gross Motor Coordination Impaired   Fine Motor Coordination Functional   Sensation   Light Touch No apparent deficits  (BUEs)   Vision-Basic Assessment   Current Vision Wears glasses all the time   Patient Visual Report Other (Comment)  (No significant changes reported)   Cognition   Overall Cognitive Status WFL   Arousal/Participation Alert; Responsive; Cooperative   Attention Within functional limits   Orientation Level Disoriented to time;Disoriented to situation;Oriented to place;Oriented to person   Memory Decreased short term memory;Decreased recall of recent events;Decreased long term memory;Decreased recall of biographical information   Following Commands Follows one step commands with increased time or repetition   Assessment   Limitation Decreased ADL status; Decreased UE ROM; Decreased Safe judgement during ADL;Decreased endurance;Decreased self-care trans;Decreased high-level ADLs   Prognosis Good   Assessment Patient is a 71 y o  female seen for OT evaluation s/p admit to [address] [place] [address] on 4/13/2018 w/Atrial flutter ([address] )  Patient  was sent here from the cardiologist's office due to rapid heart rate   Commorbidities affecting patient's functional performance at time of assessment include: Acute on chronic diastolic Congestive heart failure, Atrial Flutter, HTN,, COPD,  Prior to admission, Patient is a poor historian, chart review indicates, patient lives at [facility], was at [facility] , She requires assist with ADL's and ambulation due to dementia  She does use a walker to ambulate  Daughter lives locally and is supportive  Personal factors affecting patient at time of initial evaluation include: limited insight into deficits, decreased initiation and engagement and difficulty performing ADLs  Upon evaluation, patient requires minimal  and moderate assist for UB ADLs, moderate assist for LB ADLs, transfers and functional ambulation in room and bathroom with minimal  assist, with the use of Rolling Walker  Occupational performance is affected by the following deficits: anxiety, orientation, attention span, impaired gross motor coordination, dynamic sit/ stand balance deficit with poor standing tolerance time for self care and functional mobility, decreased activity tolerance, impaired memory, impaired problem solving, decreased safety awareness and postural control and postural alignment deficit, requiring external assistance to complete transitional movements  Therapist completed  extensive additional review of medical records and additional review of physical, cognitive or psychosocial history, clinical examination identifying 5 or more performance deficits, clinical decision making of a high complexity , consistent with a high complexity level evaluation  Patient to benefit from continued Occupational Therapy treatment while in the hospital to address deficits as defined above and maximize level of functional independence with ADLs and functional mobility  Occupational Performance areas to address include: grooming , bathing/ shower, dressing, toilet hygiene, transfer to all surfaces, functional mobility, IADLs: safety procedures, Leisure Participation and Social participation  From OT standpoint, recommendation at time of d/c would be Short Term Rehab  Plan   Treatment Interventions ADL retraining;Functional transfer training;UE strengthening/ROM; Endurance training;Patient/family training; Compensatory technique education;Continued evaluation; Energy conservation; Fine motor coordination activities; Equipment evaluation/education   Goal Expiration Date 04/30/18   OT Frequency 3-5x/wk   Recommendation   OT Discharge Recommendation Short Term Rehab   OT - OK to Discharge Yes  (STR when medically cleared)   Barthel Index   Feeding 5   Bathing 0   Grooming Score 0   Dressing Score 5   Bladder Score 0   Bowels Score 5   Toilet Use Score 5   Transfers (Bed/Chair) Score 10   Mobility (Level Surface) Score 0   Stairs Score 0   Barthel Index Score 30   Modified [place] Scale   Modified [place] Scale 4       Occupational Therapy goals: In 7-14 days:     1- Patient will verbalize and demonstrate use of energy conservation/ deep breathing technique and work simplification skills during functional activity with no verbal cues  2-Patient will verbalize and demonstrate good body mechanics and joint protection techniques during  ADLs/ IADLs with no verbal cues  3- Patient will increase OOB/ sitting tolerance to 2-4 hours per day for increased participation in self care and leisure tasks with no s/s of exertion  4-Patient will increase standing tolerance time to 5  minutes with unilateral UE support to complete sink level ADLs@ mod I level   5- Patient will increase sitting tolerance at edge of bed to 20 minutes to complete UB ADLs @ set up assist level  6- Patient will transfer bed to Chair / toilet at Set up assist level with AD as indicated  7- Patient will complete UB ADLs with set up assist  8- Patient will complete LB ADLs with min assist with the use of adaptive equipment  9- Patient will complete toileting hygiene with set up assist/ supervision for thoroughness    10-Patient/ Family  will demonstrate competency with UE Home Exercise Program

## 2018-04-16 NOTE — PLAN OF CARE
Problem: DISCHARGE PLANNING - CARE MANAGEMENT  Goal: Discharge to post-acute care or home with appropriate resources  INTERVENTIONS:  - Conduct assessment to determine patient/family and health care team treatment goals, and need for post-acute services based on payer coverage, community resources, and patient preferences, and barriers to discharge  - Address psychosocial, clinical, and financial barriers to discharge as identified in assessment in conjunction with the patient/family and health care team  - Arrange appropriate level of post-acute services according to patients   needs and preference and payer coverage in collaboration with the physician and health care team  - Communicate with and update the patient/family, physician, and health care team regarding progress on the discharge plan  - Arrange appropriate transportation to post-acute venues  Outcome: Progressing  Spoke with the daughter on the phone and she states patient came from [facility] but when not there does live at [facility]  She requires assist with ADL's and ambulation due to dementia  She does use a walker to ambulate  She has had Homecare in the past which was [facility]  Referral sent  Her daughter is her POA  The DC plan will be to go back to [place] pending PT manjula  Will call [place] to evaluate  She will need Lovenox for 10 days post DC  Will confirm this si acceptable by [facility]

## 2018-04-17 PROBLEM — I50.33 ACUTE ON CHRONIC DIASTOLIC CONGESTIVE HEART FAILURE (HCC): Status: ACTIVE | Noted: 2017-12-10

## 2018-04-17 LAB
INR PPP: 2.18 (ref 0.86–1.16)
PROTHROMBIN TIME: 24.9 SECONDS (ref 12.1–14.4)

## 2018-04-17 PROCEDURE — 97110 THERAPEUTIC EXERCISES: CPT

## 2018-04-17 PROCEDURE — 99232 SBSQ HOSP IP/OBS MODERATE 35: CPT | Performed by: INTERNAL MEDICINE

## 2018-04-17 PROCEDURE — 85610 PROTHROMBIN TIME: CPT | Performed by: INTERNAL MEDICINE

## 2018-04-17 PROCEDURE — 97116 GAIT TRAINING THERAPY: CPT

## 2018-04-17 RX ADMIN — CARBAMAZEPINE 100 MG: 100 TABLET, CHEWABLE ORAL at 21:42

## 2018-04-17 RX ADMIN — ENOXAPARIN SODIUM 30 MG: 30 INJECTION SUBCUTANEOUS at 09:56

## 2018-04-17 RX ADMIN — BUDESONIDE AND FORMOTEROL FUMARATE DIHYDRATE 2 PUFF: 160; 4.5 AEROSOL RESPIRATORY (INHALATION) at 17:04

## 2018-04-17 RX ADMIN — BUPROPION HYDROCHLORIDE 150 MG: 150 TABLET, FILM COATED, EXTENDED RELEASE ORAL at 09:57

## 2018-04-17 RX ADMIN — DIVALPROEX SODIUM 500 MG: 250 TABLET, FILM COATED, EXTENDED RELEASE ORAL at 09:56

## 2018-04-17 RX ADMIN — VITAMIN D, TAB 1000IU (100/BT) 2000 UNITS: 25 TAB at 09:57

## 2018-04-17 RX ADMIN — CARBAMAZEPINE 100 MG: 100 TABLET, CHEWABLE ORAL at 09:56

## 2018-04-17 RX ADMIN — LEVOTHYROXINE SODIUM 88 MCG: 88 TABLET ORAL at 06:14

## 2018-04-17 RX ADMIN — FUROSEMIDE 40 MG: 40 TABLET ORAL at 15:04

## 2018-04-17 RX ADMIN — CARBAMAZEPINE 100 MG: 100 TABLET, CHEWABLE ORAL at 15:04

## 2018-04-17 RX ADMIN — METOPROLOL TARTRATE 50 MG: 50 TABLET ORAL at 21:47

## 2018-04-17 RX ADMIN — METOPROLOL TARTRATE 50 MG: 50 TABLET ORAL at 09:56

## 2018-04-17 RX ADMIN — WARFARIN SODIUM 5 MG: 5 TABLET ORAL at 17:04

## 2018-04-17 RX ADMIN — ASPIRIN 81 MG: 81 TABLET, COATED ORAL at 09:57

## 2018-04-17 RX ADMIN — BUDESONIDE AND FORMOTEROL FUMARATE DIHYDRATE 2 PUFF: 160; 4.5 AEROSOL RESPIRATORY (INHALATION) at 09:57

## 2018-04-17 RX ADMIN — DILTIAZEM HYDROCHLORIDE 120 MG: 60 CAPSULE, EXTENDED RELEASE ORAL at 21:43

## 2018-04-17 RX ADMIN — FERROUS SULFATE TAB 325 MG (65 MG ELEMENTAL FE) 325 MG: 325 (65 FE) TAB at 09:56

## 2018-04-17 RX ADMIN — DILTIAZEM HYDROCHLORIDE 120 MG: 60 CAPSULE, EXTENDED RELEASE ORAL at 09:58

## 2018-04-17 RX ADMIN — ATORVASTATIN CALCIUM 10 MG: 10 TABLET, FILM COATED ORAL at 09:57

## 2018-04-17 RX ADMIN — FENOFIBRATE 48 MG: 48 TABLET ORAL at 09:56

## 2018-04-17 RX ADMIN — FUROSEMIDE 40 MG: 40 TABLET ORAL at 09:57

## 2018-04-17 RX ADMIN — FERROUS SULFATE TAB 325 MG (65 MG ELEMENTAL FE) 325 MG: 325 (65 FE) TAB at 17:04

## 2018-04-17 NOTE — PLAN OF CARE
Problem: DISCHARGE PLANNING - CARE MANAGEMENT  Goal: Discharge to post-acute care or home with appropriate resources  INTERVENTIONS:  - Conduct assessment to determine patient/family and health care team treatment goals, and need for post-acute services based on payer coverage, community resources, and patient preferences, and barriers to discharge  - Address psychosocial, clinical, and financial barriers to discharge as identified in assessment in conjunction with the patient/family and health care team  - Arrange appropriate level of post-acute services according to patient's   needs and preference and payer coverage in collaboration with the physician and health care team  - Communicate with and update the patient/family, physician, and health care team regarding progress on the discharge plan  - Arrange appropriate transportation to post-acute venues   Outcome: Bo DONALD, SLIM, and nurse met with pt, pt's daughter, and Logan Monet from [place] at bedside  Pt's daughter expressed concerns about increased confusion  Per SLIM pt will have neuro consult and plan to dc tomorrow  HUMAIRA provided update regarding Lovenox as it is no longer needed since INR is therapeutic and pt and daughter understood  Edwin Miller reported pt is current with [facility] as coordinated by [place]  Pt and daughter requested CM discontinued Larry  and [place] will coordinate JANNA  [facility] in agreement to write script as Edwin Miller requested  Pt's daughter reports she will transport pt back to [place] tomorrow when clear around 1400  Edwin Miller reported pt is accepted  CM updated Eleni Cardoza  Nurse to fax DCI to [place], [phone] when pt is clear

## 2018-04-17 NOTE — SOCIAL WORK
CM s/w Sue Bahena who agreed to accept pt if [facility] is unable to accept her back  CM to update [facility]

## 2018-04-17 NOTE — PROGRESS NOTES
Progress Note Aman Morel [birth year], 71 y o  female MRN: [id number]    Unit/Bed#: -01 Encounter: [id number]    Primary Care Provider: Merissa Yanez DO   Date and time admitted to hospital: 4/13/2018  7:08 PM        * Atrial flutter [facility])   Assessment & Plan    · Patient initially had atrial flutter/atrial fibrillation with rapid ventricle response in the emergency room  Rapid ventricle response had resolved with Cardizem  Patient was just diagnosed with atrial flutter/atrial fibrillation last month here at [facility]  · Discussion with Cardiology patient transition to long-acting Cardizem yesterday and oral Lasix   · Per discussion with Cardiology stable from their perspective for discharge  · Discussion with daughter patient is typically seen by a cardiologist and [place] however patient is been relocated to [facility] and daughter would like a cardiologist closer  Discussion with Dr Chandni Gould he will follow with this patient as an outpatient  · Continue with Coumadin  Patient's cardiologist in [place] was recommending other agents then Coumadin will defer this to Cardiology as an outpatient  · Patient will need a follow-up in the next 1-2 weeks with her cardiologist patient does not have a cardiologist she is recommended to follow up with Dr Chandni Gould  · TSH was normal last March 22nd of this year  Acute on chronic diastolic congestive heart failure (HCC)   Assessment & Plan    · Acute on Chronic diastolic congestive heart failure requiring IV diuresis and later transitioned to PO lasix     · Creatinine showed initial improvement and has remained stable        COPD (chronic obstructive pulmonary disease) (HCC)   Assessment & Plan    · No exacerbation  · Continue p r n  inhaler         Stage 3 chronic kidney disease   Assessment & Plan    · Review of patient's BMPs, revealed that the last 1 was 1 48   · Presently, it is 1 50   · Monitor patient's kidney function    · Avoid nephrotoxins  · Avoid hypotension  Dementia   Assessment & Plan    · Patient has short-term memory loss  · Supportive care  HTN (hypertension)   Assessment & Plan    BP stable, continue medications        Bilateral leg edema   Assessment & Plan    · Patient feels a tightness at the right lower extremity since her last admission here  · Venous duplex negative for acute DVT  · Overall clinically improved  Hypernatremia   Assessment & Plan    · Present on admission mild  · Resolved        Confusion    Likely delirium  Consult neurology    VTE Pharmacologic Prophylaxis:   Pharmacologic: Warfarin (Coumadin)  Mechanical VTE Prophylaxis in Place: Yes    Patient Centered Rounds: I have performed bedside rounds with nursing staff today  Discussions with Specialists or Other Care Team Provider:  Cardiology    Education and Discussions with Family / Patient:  Patient's daughter at the bedside    Time Spent for Care: 20 minutes  More than 50% of total time spent on counseling and coordination of care as described above  Current Length of Stay: 4 day(s)    Current Patient Status: Inpatient   Certification Statement: The patient will continue to require additional inpatient hospital stay due to Patient daughter report that patient is intermittently confused, will consult Neurology for further evaluation    Discharge Plan:  Plan to discharge next 24 hours    Code Status: Level 3 - DNAR and DNI      Subjective:   Patient seen and examined  She is currently alert oriented x3, denies any acute complaints at this time  As per discussion with patient's daughter patient appeared to be intermittently confused  Objective:     Vitals:   Temp (24hrs), Av 8 °F (36 6 °C), Min:97 5 °F (36 4 °C), Max:98 °F (36 7 °C)    HR:  [55-97] 82  Resp:  [16-18] 18  BP: (102-144)/(62-79) 112/79  SpO2:  [95 %-96 %] 95 %  Body mass index is 36 43 kg/m²       Input and Output Summary (last 24 hours): Intake/Output Summary (Last 24 hours) at 04/17/18 1414  Last data filed at 04/17/18 1304   Gross per 24 hour   Intake              580 ml   Output              750 ml   Net             -170 ml       Physical Exam:     Physical Exam   Constitutional: She is oriented to person, place, and time  She appears well-developed and well-nourished  HENT:   Head: Normocephalic and atraumatic  Eyes: EOM are normal  Pupils are equal, round, and reactive to light  Neck: Normal range of motion  Neck supple  Cardiovascular: Normal rate  Irregular   Pulmonary/Chest: Effort normal and breath sounds normal    Abdominal: Soft  Bowel sounds are normal    Musculoskeletal: Normal range of motion  Neurological: She is alert and oriented to person, place, and time  Skin: Skin is warm and dry  Additional Data:     Labs:      Results from last 7 days  Lab Units 04/14/18  0516 04/13/18 2022   WBC Thousand/uL 5 71 6 11   HEMOGLOBIN g/dL 10 7* 11 7   HEMATOCRIT % 34 0* 36 6   PLATELETS Thousands/uL 121* 139*   NEUTROS PCT %  --  42*   LYMPHS PCT %  --  41   MONOS PCT %  --  9   EOS PCT %  --  7*       Results from last 7 days  Lab Units 04/16/18  0549  04/13/18 2022   SODIUM mmol/L 143  < > 146*   POTASSIUM mmol/L 3 4*  < > 3 7   CHLORIDE mmol/L 105  < > 108   CO2 mmol/L 32  < > 30   BUN mg/dL 25  < > 25   CREATININE mg/dL 1 50*  < > 1 71*   CALCIUM mg/dL 9 1  < > 9 2   TOTAL PROTEIN g/dL  --   --  7 2   BILIRUBIN TOTAL mg/dL  --   --  0 30   ALK PHOS U/L  --   --  57   ALT U/L  --   --  20   AST U/L  --   --  18   GLUCOSE RANDOM mg/dL 99  < > 125   < > = values in this interval not displayed  Results from last 7 days  Lab Units 04/17/18  1146   INR  2 18*       * I Have Reviewed All Lab Data Listed Above  * Additional Pertinent Lab Tests Reviewed:  [facility]    Imaging:      Recent Cultures (last 7 days):           Last 24 Hours Medication List:     Current Facility-Administered Medications:  acetaminophen 488 mg Oral Q6H PRN Augustin Herbert MD   aspirin 81 mg Oral Daily Augustin Herbert MD   atorvastatin 10 mg Oral Daily Augustin Herbert MD   budesonide-formoterol 2 puff Inhalation BID Augustin Herbert MD   buPROPion 150 mg Oral Daily Augustin Herbert MD   carBAMazepine 100 mg Oral TID Augustin Herbert MD   cholecalciferol 2,000 Units Oral Daily Augustin Herbert MD   diltiazem 120 mg Oral Q12H Sedrick Vaz MD   divalproex sodium 500 mg Oral Daily Augustin Herbert MD   enoxaparin 30 mg Subcutaneous Q24H [address] TANVI Willis   fenofibrate 48 mg Oral Daily Augustin Herbert MD   ferrous sulfate 325 mg Oral BID Augustin Herbert MD   furosemide 40 mg Oral BID (diuretic) Taqueria Tapia MD   levalbuterol 1 25 mg Nebulization Q6H PRN Augustin Herbert MD   levothyroxine 88 mcg Oral QAM Augustin Herbert MD   metoprolol 5 mg Intravenous Q6H PRN Augustin Herbert MD   metoprolol tartrate 50 mg Oral Q12H [address] Augustin Herbert MD   ondansetron 4 mg Intravenous Q6H PRN Augustin Herbert MD   senna-docusate sodium 1 tablet Oral Daily PRN Augustin Herbert MD   warfarin 5 mg Oral Daily (warfarin) TANVI [place]        Today, Patient Was Seen By: Trey Barriga MD    ** Please Note: Dictation voice to text software may have been used in the creation of this document   **

## 2018-04-17 NOTE — ASSESSMENT & PLAN NOTE
· Acute on Chronic diastolic congestive heart failure requiring IV diuresis and later transitioned to PO lasix     · Creatinine showed initial improvement and has remained stable

## 2018-04-17 NOTE — SOCIAL WORK
CM s/w Marshall Calix who reported pt was there prior to admission but was done with rehab and returning to [facility] and treatment team are unsure if they will accept pt back but are agreeable to review referral  CM s/w Nathalie Suggs who reported she will come to [facility] and evaluate pt however she has concerns about pt returning as she has to ambulate up flight of stairs  CM awaiting determination from [facility] to determine dcp

## 2018-04-17 NOTE — PLAN OF CARE
Problem: PHYSICAL THERAPY ADULT  Goal: Performs mobility at highest level of function for planned discharge setting  See evaluation for individualized goals  Treatment/Interventions: Functional transfer training, LE strengthening/ROM, Elevations, Therapeutic exercise, Endurance training, Patient/family training, Equipment eval/education, Bed mobility, Gait training, Spoke to nursing, Spoke to case management, OT, Family  Equipment Recommended: Christa Munoz (RW)       See flowsheet documentation for full assessment, interventions and recommendations  Outcome: Progressing  Prognosis: Good  Problem List: Decreased strength, Decreased endurance, Impaired balance, Decreased mobility, Decreased cognition, Impaired judgement, Decreased safety awareness  Assessment: Dtr in at end of session  Concerned about need to go back to STR with their plan to d/c her  At this time she was fatgiued at this session and not able tolerate increased exer  Not appropriate to attempt steps at this time  Barriers to Discharge: Inaccessible home environment, Decreased caregiver support     Recommendation: Short-term skilled PT (vs return to [facility] if able to do steps)     PT - OK to Discharge: Yes (if to STR)    See flowsheet documentation for full assessment

## 2018-04-17 NOTE — SOCIAL WORK
SHABANA, SLIM, and nurse met with pt, pt's daughter, and Logan Monet from [place] at bedside  Pt's daughter expressed concerns about increased confusion  Per SLIM pt will have neuro consult and plan to dc tomorrow  HANANE provided update regarding Lovenox as it is no longer needed since INR is therapeutic and pt and daughter understood  Edwin Miller reported pt is current with [facility] as coordinated by [place]  Pt and daughter requested CM discontinued [place]  and [place] will coordinate JANNA  [facility] in agreement to write script as Edwin Miller requested  Pt's daughter reports she will transport pt back to [facility] tomorrow when clear around 1400  Edwin Miller reported pt is accepted  CM updated Eleni Cardoza  Nurse to fax DCI to [place], [phone] when pt is clear

## 2018-04-17 NOTE — PHYSICAL THERAPY NOTE
PHYSICAL THERAPY NOTE          Patient Name: Tiesha Dent  PSCKG'I Date: 4/17/2018 04/17/18 1417   Pain Assessment   Pain Assessment No/denies pain   Pain Score No Pain   Restrictions/Precautions   Weight Bearing Precautions Per Order No   Other Precautions Cognitive; Fall Risk; Chair Alarm; Bed Alarm   General   Chart Reviewed Yes  (cleared for PT by RN)   Response to Previous Treatment Patient with no complaints from previous session  Family/Caregiver Present Yes  (not during session, at end of session)   Cognition   Overall Cognitive Status Impaired   Arousal/Participation Alert; Responsive; Cooperative   Attention Attends with cues to redirect   Orientation Level Oriented to person;Disoriented to place; Disoriented to time;Disoriented to situation   Memory Decreased short term memory;Decreased recall of recent events;Decreased long term memory;Decreased recall of biographical information   Following Commands Follows one step commands with increased time or repetition   Comments agreeable to PT   Subjective   Subjective no particular comments   Bed Mobility   Additional Comments patient out in chair   Transfers   Sit to Stand 5  Supervision   Additional items Assist x 1; Armrests; Verbal cues   Stand to Sit 5  Supervision   Additional items Assist x 1; Armrests; Verbal cues  (RW)   Toilet transfer 5  Supervision   Additional items Assist x 1; Armrests;Raised toilet seat   Additional Comments verbal cues for safety   Ambulation/Elevation   Gait pattern Decreased foot clearance;Shuffling; Step to; Inconsistent randi; Excessively slow; Improper Weight shift  (incr shuffle with RLE more than L)   Gait Assistance (CG assist)   Additional items Assist x 1;Verbal cues; Tactile cues   Assistive Device Rolling walker  (weight shift assist to enhance LE advancement)   Distance 40 feet and 150 feet   Balance   Static Sitting Good   Dynamic Sitting Good   Static Standing Fair +   Dynamic Standing Fair  (with RW)   Ambulatory Fair  (with RW)   Endurance Deficit   Endurance Deficit Yes   Endurance Deficit Description fatigue   Activity Tolerance   Activity Tolerance Patient limited by fatigue   Nurse Made Aware RN, CM and MD aware of session   Exercises   Heelslides Sitting;10 reps;AROM; Bilateral  (reclined, 2 sets)   Hip Flexion Sitting;10 reps;AAROM; Bilateral  (SLR, reclined)   Hip Abduction Sitting;10 reps;AAROM; Bilateral  (reclined, 2 sets)   Hip Adduction Sitting;10 reps;AAROM; Bilateral  (reclined)   Ankle Pumps Sitting;10 reps;AROM; Bilateral  (x 2 sets)   Assessment   Prognosis Good   Problem List Decreased strength;Decreased endurance; Impaired balance;Decreased mobility; Decreased cognition; Impaired judgement;Decreased safety awareness   Assessment Dtr in at end of session  Concerned about need to go back to [facility] with their plan to d/c her  At this time she was fatgiued at this session and not able tolerate increased exer  Not appropriate to attempt steps at this time  Barriers to Discharge Inaccessible home environment;Decreased caregiver support   Goals   Patient Goals none stated, dtr-- return to [facility]   STG Expiration Date 04/26/18   Treatment Day 2   Plan   Treatment/Interventions Functional transfer training;Elevations; Therapeutic exercise; Endurance training;Patient/family training;Equipment eval/education; Bed mobility;Gait training;Spoke to nursing;Spoke to case management;Spoke to MD   Progress Progressing toward goals   PT Frequency 5x/wk   Recommendation   Recommendation Short-term skilled PT  (vs return to [facility] if able to do steps)   Equipment Recommended (TBD)   PT - OK to Discharge Yes  (if to [facility])     Dev Torre, PT

## 2018-04-17 NOTE — ASSESSMENT & PLAN NOTE
· Patient initially had atrial flutter/atrial fibrillation with rapid ventricle response in the emergency room  Rapid ventricle response had resolved with Cardizem  Patient was just diagnosed with atrial flutter/atrial fibrillation last month here at [facility]  · Discussion with Cardiology patient transition to long-acting Cardizem yesterday and oral Lasix   · Per discussion with Cardiology stable from their perspective for discharge  · Discussion with daughter patient is typically seen by a cardiologist and [place] however patient is been relocated to [place] and daughter would like a cardiologist closer  Discussion with Dr Komal Calle he will follow with this patient as an outpatient  · Continue with Coumadin  Patient's cardiologist in [place] was recommending other agents then Coumadin will defer this to Cardiology as an outpatient  · Patient will need a follow-up in the next 1-2 weeks with her cardiologist patient does not have a cardiologist she is recommended to follow up with Dr Komal Calle  · TSH was normal last March 22nd of this year

## 2018-04-18 VITALS
DIASTOLIC BLOOD PRESSURE: 71 MMHG | RESPIRATION RATE: 18 BRPM | TEMPERATURE: 97.9 F | BODY MASS INDEX: 36.27 KG/M2 | OXYGEN SATURATION: 95 % | WEIGHT: 184.75 LBS | SYSTOLIC BLOOD PRESSURE: 127 MMHG | HEART RATE: 67 BPM | HEIGHT: 60 IN

## 2018-04-18 PROBLEM — F06.30 MOOD DISORDER AS LATE EFFECT OF CEREBROVASCULAR ACCIDENT (CVA): Status: ACTIVE | Noted: 2018-04-18

## 2018-04-18 PROBLEM — I69.398 MOOD DISORDER AS LATE EFFECT OF CEREBROVASCULAR ACCIDENT (CVA): Status: ACTIVE | Noted: 2018-04-18

## 2018-04-18 LAB
CARBAMAZEPINE SERPL-MCNC: 6.5 UG/ML (ref 4–12)
INR PPP: 2.66 (ref 0.86–1.16)
PROTHROMBIN TIME: 29.1 SECONDS (ref 12.1–14.4)
VALPROATE SERPL-MCNC: 35 UG/ML (ref 50–100)
VALPROATE SERPL-MCNC: 36 UG/ML (ref 50–100)

## 2018-04-18 PROCEDURE — 99222 1ST HOSP IP/OBS MODERATE 55: CPT | Performed by: PSYCHIATRY & NEUROLOGY

## 2018-04-18 PROCEDURE — 99238 HOSP IP/OBS DSCHRG MGMT 30/<: CPT | Performed by: INTERNAL MEDICINE

## 2018-04-18 PROCEDURE — 99232 SBSQ HOSP IP/OBS MODERATE 35: CPT | Performed by: INTERNAL MEDICINE

## 2018-04-18 PROCEDURE — 80164 ASSAY DIPROPYLACETIC ACD TOT: CPT | Performed by: NURSE PRACTITIONER

## 2018-04-18 PROCEDURE — 85610 PROTHROMBIN TIME: CPT | Performed by: INTERNAL MEDICINE

## 2018-04-18 PROCEDURE — 97116 GAIT TRAINING THERAPY: CPT

## 2018-04-18 PROCEDURE — 97530 THERAPEUTIC ACTIVITIES: CPT

## 2018-04-18 RX ORDER — DIVALPROEX SODIUM 250 MG/1
250 TABLET, EXTENDED RELEASE ORAL DAILY
Qty: 30 TABLET | Refills: 0 | Status: SHIPPED | OUTPATIENT
Start: 2018-04-19 | End: 2018-04-18

## 2018-04-18 RX ORDER — FUROSEMIDE 40 MG/1
40 TABLET ORAL 2 TIMES DAILY
Qty: 30 TABLET | Refills: 0 | Status: SHIPPED | OUTPATIENT
Start: 2018-04-18 | End: 2018-05-12 | Stop reason: HOSPADM

## 2018-04-18 RX ORDER — POTASSIUM CHLORIDE 20 MEQ/1
20 TABLET, EXTENDED RELEASE ORAL DAILY
Qty: 15 TABLET | Refills: 0 | Status: SHIPPED | OUTPATIENT
Start: 2018-04-18 | End: 2018-04-18

## 2018-04-18 RX ORDER — DILTIAZEM HYDROCHLORIDE 120 MG/1
120 CAPSULE, EXTENDED RELEASE ORAL EVERY 12 HOURS SCHEDULED
Qty: 30 CAPSULE | Refills: 0 | Status: SHIPPED | OUTPATIENT
Start: 2018-04-18 | End: 2018-05-12 | Stop reason: HOSPADM

## 2018-04-18 RX ORDER — DILTIAZEM HYDROCHLORIDE 120 MG/1
120 CAPSULE, EXTENDED RELEASE ORAL EVERY 12 HOURS SCHEDULED
Qty: 30 CAPSULE | Refills: 0 | Status: SHIPPED | OUTPATIENT
Start: 2018-04-18 | End: 2018-04-18

## 2018-04-18 RX ORDER — POTASSIUM CHLORIDE 20 MEQ/1
20 TABLET, EXTENDED RELEASE ORAL DAILY
Qty: 15 TABLET | Refills: 0 | Status: ON HOLD | OUTPATIENT
Start: 2018-04-18 | End: 2018-12-14

## 2018-04-18 RX ORDER — DIVALPROEX SODIUM 250 MG/1
250 TABLET, EXTENDED RELEASE ORAL DAILY
Status: DISCONTINUED | OUTPATIENT
Start: 2018-04-19 | End: 2018-04-18 | Stop reason: HOSPADM

## 2018-04-18 RX ORDER — DIVALPROEX SODIUM 250 MG/1
250 TABLET, EXTENDED RELEASE ORAL DAILY
Qty: 30 TABLET | Refills: 0 | Status: ON HOLD | OUTPATIENT
Start: 2018-04-19 | End: 2018-12-14

## 2018-04-18 RX ORDER — FUROSEMIDE 40 MG/1
40 TABLET ORAL 2 TIMES DAILY
Qty: 30 TABLET | Refills: 0 | Status: SHIPPED | OUTPATIENT
Start: 2018-04-18 | End: 2018-04-18

## 2018-04-18 RX ADMIN — FUROSEMIDE 40 MG: 40 TABLET ORAL at 07:29

## 2018-04-18 RX ADMIN — LEVOTHYROXINE SODIUM 88 MCG: 88 TABLET ORAL at 06:06

## 2018-04-18 RX ADMIN — DILTIAZEM HYDROCHLORIDE 120 MG: 60 CAPSULE, EXTENDED RELEASE ORAL at 09:30

## 2018-04-18 RX ADMIN — ASPIRIN 81 MG: 81 TABLET, COATED ORAL at 09:29

## 2018-04-18 RX ADMIN — METOPROLOL TARTRATE 50 MG: 50 TABLET ORAL at 09:29

## 2018-04-18 RX ADMIN — ENOXAPARIN SODIUM 30 MG: 30 INJECTION SUBCUTANEOUS at 09:28

## 2018-04-18 RX ADMIN — BUDESONIDE AND FORMOTEROL FUMARATE DIHYDRATE 2 PUFF: 160; 4.5 AEROSOL RESPIRATORY (INHALATION) at 09:30

## 2018-04-18 RX ADMIN — FENOFIBRATE 48 MG: 48 TABLET ORAL at 09:29

## 2018-04-18 RX ADMIN — ATORVASTATIN CALCIUM 10 MG: 10 TABLET, FILM COATED ORAL at 09:29

## 2018-04-18 RX ADMIN — BUPROPION HYDROCHLORIDE 150 MG: 150 TABLET, FILM COATED, EXTENDED RELEASE ORAL at 09:29

## 2018-04-18 RX ADMIN — FERROUS SULFATE TAB 325 MG (65 MG ELEMENTAL FE) 325 MG: 325 (65 FE) TAB at 09:29

## 2018-04-18 RX ADMIN — CARBAMAZEPINE 100 MG: 100 TABLET, CHEWABLE ORAL at 09:28

## 2018-04-18 RX ADMIN — DIVALPROEX SODIUM 500 MG: 250 TABLET, FILM COATED, EXTENDED RELEASE ORAL at 11:27

## 2018-04-18 RX ADMIN — VITAMIN D, TAB 1000IU (100/BT) 2000 UNITS: 25 TAB at 09:29

## 2018-04-18 NOTE — DISCHARGE SUMMARY
Discharge- Yoly Rodas [birth year], 71 y o  female MRN: [id number]    Unit/Bed#: -01 Encounter: [id number]    Primary Care Provider: Nathalie Fabian,    Date and time admitted to hospital: 4/13/2018  7:08 PM        * Atrial flutter [facility])   Assessment & Plan    · Patient initially had atrial flutter/atrial fibrillation with rapid ventricle response in the emergency room  Rapid ventricle response had resolved with Cardizem  Patient was just diagnosed with atrial flutter/atrial fibrillation last month here at [facility]  Continue Cardizem and Coumadin   Check INR in 3-4 days, follow up with PCP/Cardiology  Patient would have to discuss with her Cardiologist about switching to an novel anticoagulants  Acute on chronic diastolic congestive heart failure ([facility])   Assessment & Plan    · Acute on Chronic diastolic congestive heart failure requiring IV diuresis and later transitioned to PO lasix     · Creatinine showed initial improvement and has remained stable        COPD (chronic obstructive pulmonary disease) ([facility])   Assessment & Plan    · No exacerbation  · Continue p r n  inhaler   · On admission patient was on Symbicort and breo-elipta  Symbicort was discontinued and patient recommended to continue Breo-ellipta, albuterol prn  · Close outpatient follow-up with PCP        Stage 3 chronic kidney disease   Assessment & Plan    · Review of patient's BMPs, revealed that the last 1 was 1 48   · Presently, it is 1 50   · Monitor patient's kidney function  · Avoid nephrotoxins  · Avoid hypotension  Dementia   Assessment & Plan    · Patient has short-term memory loss  · Supportive care  HTN (hypertension)   Assessment & Plan    BP stable, continue medications        Bilateral leg edema   Assessment & Plan    · Patient feels a tightness at the right lower extremity since her last admission here  · Venous duplex negative for acute DVT  · Overall clinically improved          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   Assessment & Plan    Stable  Continue Wellbutrin and Depakote        Seizure disorder [facility])   Assessment & Plan    Currently stable with medications  Continue Depakote and carbamazepine  Outpatient Neurology follow-up              Discharging Physician / Practitioner: Shaquille Gasca MD  PCP: Martin Osorio DO  Admission Date:   Admission Orders     Ordered        04/13/18 2250  Inpatient Admission (expected length of stay for this patient is greater than two midnights)  Once             Discharge Date: 04/18/18    Resolved Problems  Date Reviewed: 4/18/2018    None          Consultations During Hospital Stay:  · Cardiology    Procedures Performed:   None    Significant Findings / Test Results:   Venous Dopplers-no evidence of DVT      Incidental Findings:   · None    Test Results Pending at Discharge (will require follow up): · Depakote level     Outpatient Tests Requested:  PT/INR in 3-4 days  Patient should follow up with PCP    Complications:  None    Reason for Admission:  AFib with RVR    Hospital Course: Kirby Connolly is a 71 y o  female with past medical history of new onset atrial fibrillation, chronic diastolic heart failure, seizure disorder, dementia patient who originally presented to the hospital on 4/13/2018 due to AFib with RVR  Patient was at her cardiologist office, found to have rapid heart rate so was sent to emergency department for further evaluation  ED patient was found to have atrial fibrillation with rapid ventricular response at 130-140 per minute  EKG showed AFib  BNP was elevated on admission  This patient was admitted for further evaluation of atrial flutter and acute heart failure  Heart rates improved and controlled on current dose of Cardizem, Lopressor  Coumadin to be continued for anticoagulation  Patient should follow up with outpatient cardiology for transition to no well anticoagulants    She needs to check an INR in 3-4 days  Patient was found to be in acute diastolic heart failure initially she was placed on IV Lasix and later transitioned to p o  Lasix  She was discharged on 40 mg p o  b i d with potassium supplementation  As per patient's daughter, patient appeared to have intermittent confusion which Neurology was consulted and evaluated the patient  Likely cause is delirium given underlying dementia  Patient takes Depakote for mood disorder, levels have been drawn and pending at the time of discharge  Patient to follow up with Urology as outpatient for further follow-up  On admission patient found to have 2 long-acting bronchodilators, albuterol and DuoNeb, these medications have been adjusted on discharge  Patient recommended to follow up with PCP  Other chronic medical problems have been stable  Please see above list of diagnoses and related plan for additional information  Condition at Discharge: stable     Discharge Day Visit / Exam:     Subjective:  Patient seen and examined today  Denies any complaints  No acute events per nursing staff  Vitals: Blood Pressure: 127/71 (04/18/18 0700)  Pulse: 67 (04/18/18 0700)  Temperature: 97 9 °F (36 6 °C) (04/18/18 0700)  Temp Source: Oral (04/18/18 0700)  Respirations: 18 (04/18/18 0700)  Height: 5' (152 4 cm) (04/13/18 2354)  Weight - Scale: 83 8 kg (184 lb 11 9 oz) (04/18/18 0600)  SpO2: 95 % (04/18/18 0700)  Exam:   Physical Exam   Constitutional: She is oriented to person, place, and time  She appears well-developed and well-nourished  HENT:   Head: Normocephalic and atraumatic  Eyes: EOM are normal  Pupils are equal, round, and reactive to light  Neck: Normal range of motion  Neck supple  Cardiovascular: Normal rate  Irregular   Pulmonary/Chest: Effort normal and breath sounds normal    Abdominal: Soft  Bowel sounds are normal    Musculoskeletal: Normal range of motion  Neurological: She is alert and oriented to person, place, and time  Skin: Skin is warm and dry  Psychiatric: She has a normal mood and affect  Discussion with Family:  Discussed with daughter over the phone and explained the management plan    Discharge instructions/Information to patient and family:   See after visit summary for information provided to patient and family  Provisions for Follow-Up Care:  See after visit summary for information related to follow-up care and any pertinent home health orders  Disposition:     Assisted Living Facility at [address] [place] [address] SNF:   · [facility] personal care - Not Applicable to this Patient    Planned Readmission: none     Discharge Statement:  I spent 25 minutes discharging the patient  This time was spent on the day of discharge  I had direct contact with the patient on the day of discharge  Greater than 50% of the total time was spent examining patient, answering all patient questions, arranging and discussing plan of care with patient as well as directly providing post-discharge instructions  Additional time then spent on discharge activities  Discharge Medications:  See after visit summary for reconciled discharge medications provided to patient and family        ** Please Note: This note has been constructed using a voice recognition system **

## 2018-04-18 NOTE — ASSESSMENT & PLAN NOTE
This patient carries a diagnosis of dementia relating to her cerebral aneurysm rupture approximately 20 years ago  Savita Marie Examination at this time is also consistent with of an Alzheimer's type dementia  To a better her further characterize her dementia neuropsych testing done on an outpatient basis can certainly be carried out  This would better characterize her weaknesses so that her strengths can be maximized  Her gait is well preserved so I expect she can remain fairly mobile  She may benefit from the start of a medication such as donepezil or the Exelon patch  This would slow her cognitive deterioration and preserve her functional status  The episode that the daughter reported yesterday which generated this neuro consult was 1 of it sounds like it may have been a transient episode of confusion and lethargy  I see no evidence of an active infection whether not she slept poorly the night before and that was responsible for her poor daytime performance is certainly possible There is no evidence, without MRI scanning, of an infarct possibly related to her newly diagnosed a flutter  This patient can follow up with us in the office in approximately 1 month and we can discuss with her daughter at that time starting medication  I spoke briefly with her daughter she has previously seen Dr Lory Charles in [facility] but the daughter reports that that was some time ago

## 2018-04-18 NOTE — ASSESSMENT & PLAN NOTE
Currently stable with medications    Continue Depakote and carbamazepine  Outpatient Neurology follow-up

## 2018-04-18 NOTE — CONSULTS
Neurology Consult- Celia Russ 1945, 71 y o  female   MRN: [id number] Unit/Bed#: -01 Encounter: [id number]      Inpatient consult to Neurology  Consult performed by: Conor DAWN ordered by: Otto Cook      Reason for Consult / Principal Problem:   Confusion and dementia  Hx and PE limited by:  None  Review of previous medical records was limited but completed  Family, was not present however I spoke with the patient's daughter over the phone  Dementia   Assessment & Plan    This patient carries a diagnosis of dementia relating to her cerebral aneurysm rupture approximately 20 years ago  Rosella Goldmann Examination at this time is also consistent with that of an Alzheimer's type dementia  To better characterize her dementia neuropsych testing done on an outpatient basis can certainly be carried out  This would better characterize her weaknesses so that her strengths can be maximized  Her gait is well preserved so I expect she can remain fairly mobile  She may benefit from the start of a medication such as donepezil or the Exelon patch  This would slow her cognitive deterioration and preserve her functional status  The episode that the daughter reported yesterday which generated this neuro consult was 1 of it sounds like it may have been a transient episode of confusion and lethargy  I see no evidence of an active infection whether not she slept poorly the night before and that was responsible for her poor daytime performance is certainly possible There is no evidence, without MRI scanning, of an infarct possibly related to her newly diagnosed a flutter  This patient can follow up with us in the office in approximately 1 month and we can discuss with her daughter at that time starting medication    I spoke briefly with her daughter she has previously seen Dr Kamilah Morales in [facility] but the daughter reports that that was some time ago, and she is free to follow up with our [facility] [place] office  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   Assessment & Plan    The patient's daughter reports that she has just been in [facility] geriatric psych unit for mood stabilization and agitation  She reports that they had started her and she appeared to be doing well on Depakote of 250 once a day  The daughter is concerned that sometime during her last hospitalization heard her to her Depakote had been increased back up to 500 on a daily basis and could this be responsible for the lethargy that the daughter feels the patient experienced yesterday on the 17th  I have asked for a Depakote level to be drawn off her initial blood work  I suspect she could be kidney back to 250 on a daily basis  Her mood is quite stable at this time although given her cognitive history these environmental changes of going back and forth from a nursing home to hospital, coupled with the poor sleep, different caretakers, and possibly any significant hemodynamic changes from her AFib or flutter could have been responsible for the transient episode that the daughter was worried about yesterday  I do not suspect a seizure  I have reduced her Depakote back to 250 on a daily basis of an extended release tablet  I have also asked that a Depakote level be run off of the serum sample from morning lab draws on the 16th  Seizure disorder [facility])   Assessment & Plan    The patient's daughter reports that her seizure disorder is long-standing  She apparently has been controlled on her Tegretol 100 mg 3 times a day for some years now  Daughter reports she has not had a seizure in quite some time  Her Depakote/divalproex is being used for behavioral and mood stabilization  The episode that the daughter reported yesterday, given her dementia may simply have been a transient episode related to sleep deprivation, although could have been a sees small seizure, complex partial in nature    She will be continuing Hakan Tapia on her carbamazepine/Tegretol and the lab will run a level as well on her carbamazepine  This will be available for outpatient follow-up  She is stable for discharge at this time  * Atrial flutter [facility])   Assessment & Plan    Her a flutter is currently controlled by Cardiology on a Cardizem and she is also been started on Coumadin  Both Coumadin and Depakote are protein bound it may be difficult to keep her Coumadin levels stable competing with the Depakote is she goes into the summer with the possible dietary changes people experience in the summer given fresh fruit and vegetables  Consideration possibly could be given to moving her to 1 of the novel oral anticoagulants  This too can be discussed as an outpatient with the daughter at a later date  On exam I see no evidence of any lateralizing features that would suggest she has had an infarct related to the AFib flutter  HPI: Amita Pierce is a right handed  71 y o  female who has a remote history of a ruptured cerebral aneurysm with subsequent seizure disorder  The patient's daughter, who provided the history by telephone reports that after her mother's aneurysm she has continually had memory and cognitive problems  She reports that it has continued to deteriorate over the years       She follows with Dr Edward Oleary locally  But has not seen him in several years  She most recently has been in the [address] geriatric psychiatric unit for agitation and mood swings at that time she was started on Depakote 250 daily the daughter reports it was working well  This patient was admitted to this hospital on the 13th after she was seen in her cardiologist's office as an outpatient for AFib    She has been doing well here on the floor for workup and control of her AFib 1 apparently yesterday on the 17th the patient had an episode in the morning were when the daughter came to see her she felt that her mother was clearly confused she was unable to follow the simple directions  Her speech was slurred and delayed  She was unable to realize that she was on the toilet even though she kept repeating that she needed to void  The daughter's concern at the time yesterday was that her Depakote level was too high because she was on 500 mg once a day of the Depakote  Neurology is asked to evaluate  The patient was evaluated at the bedside this morning she is unable to report details of her history in freely admits that her memory is bad but she is unable to report why  ROS: 12 system cued query:  She reports that she feels fine today  She has a limited ability to reflect and recall  She reports that she thinks she slept well overnight  She denies any hunger or pain  No numbness or tingling  No lateralizing weakness  She reports that she walks well she has not fallen and both PT and her nurse report that she is quite ambulatory and was able to do stairs as well  Historical Information     Past Medical History:   Diagnosis Date    Brain aneurysm 0     CAD (coronary artery disease)     Cardiac disease     [facility]est[facility]     CKD (chronic kidney disease)     COPD (chronic obstructive pulmonary disease) (HCC)     Dementia     Dementia     Disease of thyroid gland     Hyperlipidemia     Hyperlipidemia     Hypertension     MI [facility]ocardial infarction) [facility]     Migraine     Renal disorder     Seizures ([facility] ) related to her aneurysm 1997     Stroke [facility])      Past Surgical History:   Procedure Laterality Date    APPENDECTOMY      BLADDER TUMOR EXCISION      BRAIN SURGERY      CARDIAC SURGERY      CORONARY STENT PLACEMENT      5 stents    EYE SURGERY      MANDIBLE FRACTURE SURGERY      TONSILLECTOMY      TUBAL LIGATION      UTERINE FIBROID SURGERY         Social History  she is currently living in a skilled nursing facility  The she is a nonsmoker no alcohol no recreational is          Family History:   Family History   Problem Relation Age of Onset    Heart disease Father          Allergies   Allergen Reactions    Ramipril Anaphylaxis    Spiriva Handihaler [Tiotropium Bromide Monohydrate] Swelling    Penicillins      Meds:all current active meds have been reviewed and She was on 250 daily of Depakote on a previous admission she appears in March her Depakote was increased to 500 I am unable to ascertain why  The remainder of her meds were reviewed  Scheduled Meds:  Current Facility-Administered Medications:  acetaminophen 488 mg Oral Q6H PRN   aspirin 81 mg Oral Daily   atorvastatin 10 mg Oral Daily   budesonide-formoterol 2 puff Inhalation BID   buPROPion 150 mg Oral Daily   carBAMazepine 100 mg Oral TID   cholecalciferol 2,000 Units Oral Daily   diltiazem 120 mg Oral Q12H TONI   divalproex sodium 500 mg Oral Daily   enoxaparin 30 mg Subcutaneous Q24H TONI   fenofibrate 48 mg Oral Daily   ferrous sulfate 325 mg Oral BID   furosemide 40 mg Oral BID (diuretic)   levalbuterol 1 25 mg Nebulization Q6H PRN   levothyroxine 88 mcg Oral QAM   metoprolol 5 mg Intravenous Q6H PRN   metoprolol tartrate 50 mg Oral Q12H TONI   ondansetron 4 mg Intravenous Q6H PRN   senna-docusate sodium 1 tablet Oral Daily PRN   warfarin 5 mg Oral Daily (warfarin)     PRN Meds:   acetaminophen    levalbuterol    metoprolol    ondansetron    senna-docusate sodium      Physical Exam:   Objective   Vitals:Blood pressure 127/71, pulse 67, temperature 97 9 °F (36 6 °C), temperature source Oral, resp  rate 18, height 5' (1 524 m), weight 83 8 kg (184 lb 11 9 oz), SpO2 95 %  ,Body mass index is 36 08 kg/m²  Patient was examined in bedside chair she was gaited assisted to the bathroom  She walks well with a handhold or her walker    General: alert, appears stated age and cooperative  Head: Normocephalic, without obvious abnormality, atraumatic  Oral exam: lips, mucosa, and tongue moist;   Neck: no carotid bruit,   Lungs, heart, abdomen:  WNL  Extremities: atraumatic, no cyanosis or edema    Neurologic:   Mental status: Alert, she oriented ease Mckenna to the door for a social greeting  Her speech was spontaneous without dysarthria or aphasia  oriented to self she knew that she was not at home  When given a choice hospital her Oriental orthodox she picked her hospital she reported the name of it to be the name of her nursing facility  She could report her birthday she initially thought it was December  She was unable to report the year  She was able to report the present on she did not care for him  thought content appropriate in regards to the importance of hospitalization to rule out a stroke  She was able to reverse order the months of the year she also looks quickly calculated a sum of coins is a 41 cents, fairly quickly  She was able to register she was unable to  Recall  She was unable to list anything more than 2 animals  She was however able to abstract with a proverb she was however unable to abstract for similar items  She was unable to draw clock and she rather neatly wrote the numbers in linear column  That was on a 2nd attempt  She had a sentence without poor handwriting she was able to simply copy a simple cube  CN Exam:  Intact the nonlateralizing MARJORIE, EOM's I, VF full, Gaze conjugate No sensory or motor lateralizations (No PP on face), Hearing I B, CNIX-XII I B  Motor: full power, none lateralizing age appropriate x 4 limbs, proximal and distal  Sensory: intact  X 4 limbs, 4 mod inc lt, temp, vib, (not PP tested)  Cerebellar: no past pointing or drift from modified seated Romberg position, no ataxia w maneuvers, Fine motor & finger taps, age appropriate, WNL  DTR's:  +2 in her uppers +1 her lowers Age appropriate, WNL; Plantars: downgoing bilaterally  Gait: Fluid smooth, but wide-based no LOB w directional change to the bathroom           Lab Results:   I have personally reviewed pertinent reports    , CBC:   Results from last 7 days  Lab Units 04/14/18  0516 04/13/18 2022   WBC Thousand/uL 5 71 6 11   RBC Million/uL 3 24* 3 49*   HEMOGLOBIN g/dL 10 7* 11 7   HEMATOCRIT % 34 0* 36 6   MCV fL 105* 105*   PLATELETS Thousands/uL 121* 139*   , BMP/CMP:   Results from last 7 days  Lab Units 04/16/18  0549 04/15/18  1605 04/14/18  0517 04/13/18 2022   SODIUM mmol/L 143 142 145 146*   POTASSIUM mmol/L 3 4* 3 7 3 5 3 7   CHLORIDE mmol/L 105 103 110* 108   CO2 mmol/L 32 31 30 30   ANION GAP mmol/L 6 8 5 8   BUN mg/dL 25 25 24 25   CREATININE mg/dL 1 50* 1 51* 1 54* 1 71*   GLUCOSE RANDOM mg/dL 99 100 101 125   CALCIUM mg/dL 9 1 9 8 9 0 9 2   AST U/L  --   --   --  18   ALT U/L  --   --   --  20   ALK PHOS U/L  --   --   --  57   TOTAL PROTEIN g/dL  --   --   --  7 2   BILIRUBIN TOTAL mg/dL  --   --   --  0 30   EGFR ml/min/1 73sq m 35 35 34 30   , Vitamin B12:   , HgBA1C:   , TSH:   , Coagulation:   Results from last 7 days  Lab Units 04/18/18  0438   INR  2 66*   , Lipid Profile:        Imaging Studies: I have personally reviewed pertinent films in PACS I note her clear area of encephalomalacia in the right frontal region related to her aneurysm as well as her aneurysm clipping  There is chronic small vessel disease noted throughout  There are generous ventricles radiology reports that they are proportionate to her degree of atrophy encephalomalacia  Counseling / Coordination of Care  Total time spent today 45 minutes  Greater than 50% of total time was spent with the patient and / or family counseling and / or coordination of care  A description of the counseling / coordination of care: All of the above was discussed with the patient's daughter over the phone  She had several questions all of which I believe were answered to her satisfaction at this time  Was also discussed with Internal Medicine  Dictation voice to text software has been used in the creation of this document   Please consider this in light of any contextual or grammatical errors

## 2018-04-18 NOTE — ASSESSMENT & PLAN NOTE
· Patient initially had atrial flutter/atrial fibrillation with rapid ventricle response in the emergency room  Rapid ventricle response had resolved with Cardizem  Patient was just diagnosed with atrial flutter/atrial fibrillation last month here at [address]  Continue Cardizem and Coumadin   Check INR in 1 week follow up results with PCP  Patient would have to discuss with her Cardiology to switch to an novel anticoagulants

## 2018-04-18 NOTE — DISCHARGE INSTRUCTIONS
Please get the INR in 3-4 days and follow up the results with your family doctor  Please make an appointment with the neurologist in 1-2 weeks to discuss about results of Depakote level    Please make an appointment with your family doctor as a follow-up after hospitalization    Please make an appointment with the cardiologist to to follow-up regarding irregular heart rate/atrial fibrillation

## 2018-04-18 NOTE — PROGRESS NOTES
Progress Note - Cardiology     Quinn Edwards 71 y o  female MRN: [id number]  Unit/Bed#: -01 Encounter: [id number]      Subjective:   No significant events overnight  Patient denies chest pain, SOB, palpitations, lightheadedness  Objective:   Vitals:  Vitals:    18 0700   BP: 127/71   Pulse: 67   Resp: 18   Temp: 97 9 °F (36 6 °C)   SpO2: 95%       Body mass index is 36 08 kg/m²  Systolic (29VUU), FSK:038 , Min:109 , VW     Diastolic (51SUA), UVJ:36, Min:57, Max:73      Intake/Output Summary (Last 24 hours) at 18 1131  Last data filed at 18 0700   Gross per 24 hour   Intake              300 ml   Output             1075 ml   Net             -775 ml     Weight (last 2 days)     Date/Time   Weight    18 0600  83 8 (184 75)    18 0556  84 6 (186 51)    18 0600  83 1 (183 2)              PHYSICAL EXAM:  General: Patient is not in acute distress  Laying comfortably in the bed  Head: Normocephalic  Atraumatic  HEENT: Both pupils normal sized, round and reactive to light  Nonicteric  Neck: JVP not raised  Trachea central  No thyromegaly  Respiratory: Bilateral bronchovascular breath sounds with no crackles or rhonchi  Cardiovascular: RRR  S1 and S2 normal  No murmur, rub or gallop  GI: Abdomen soft, nontender  No guarding or rigidity  Neurologic: Patient is awake, alert, responding to command   Moving all extremities  Integumentary:  No skin rash  Extremities: No clubbing, cyanosis or edema    LABORATORY RESULTS:    Results from last 7 days  Lab Units 18   TROPONIN I ng/mL <0 02     CBC with diff:   Results from last 7 days  Lab Units 18  0516 18   WBC Thousand/uL 5 71 6 11   HEMOGLOBIN g/dL 10 7* 11 7   HEMATOCRIT % 34 0* 36 6   MCV fL 105* 105*   PLATELETS Thousands/uL 121* 139*   MCH pg 33 0 33 5   MCHC g/dL 31 5 32 0   RDW % 13 2 13 2   MPV fL 12 1 12 1   NRBC AUTO /100 WBCs  --  0       CMP:  Results from last 7 days  Lab Units 18  0549 04/15/18  1605 18  0517 18  2022   SODIUM mmol/L 143 142 145 146*   POTASSIUM mmol/L 3 4* 3 7 3 5 3 7   CHLORIDE mmol/L 105 103 110* 108   CO2 mmol/L 32 31 30 30   ANION GAP mmol/L 6 8 5 8   BUN mg/dL 25 25 24 25   CREATININE mg/dL 1 50* 1 51* 1 54* 1 71*   GLUCOSE RANDOM mg/dL 99 100 101 125   CALCIUM mg/dL 9 1 9 8 9 0 9 2   AST U/L  --   --   --  18   ALT U/L  --   --   --  20   ALK PHOS U/L  --   --   --  57   TOTAL PROTEIN g/dL  --   --   --  7 2   BILIRUBIN TOTAL mg/dL  --   --   --  0 30   EGFR ml/min/1 73sq m 35 35 34 30       BMP:  Results from last 7 days  Lab Units 18  0549 04/15/18  1605 18  0517   SODIUM mmol/L 143 142 145   POTASSIUM mmol/L 3 4* 3 7 3 5   CHLORIDE mmol/L 105 103 110*   CO2 mmol/L 32 31 30   BUN mg/dL 25 25 24   CREATININE mg/dL 1 50* 1 51* 1 54*   GLUCOSE RANDOM mg/dL 99 100 101   CALCIUM mg/dL 9 1 9 8 9 0         Results from last 7 days  Lab Units 18  202   NT-PRO BNP pg/mL 5,492*          Results from last 7 days  Lab Units 18  0517   MAGNESIUM mg/dL 2 0                     Results from last 7 days  Lab Units 18  0438 18  1146 18  1452 04/15/18  0013 18   INR  2 66* 2 18* 1 59* 1 48* 1 71*         Cardiac testing:   Results for orders placed during the hospital encounter of 12/10/17   Echo complete with contrast if indicated    [address] [address] [phone]    Transthoracic Echocardiogram  2D, M-mode, Doppler, and Color Doppler    Study date:  11-Dec-2017    Patient: Saundra Tripathi  MR number: [id number]  Account number: [de-identified]  : 1945  Age: 71 years  Gender: Female  Status: Inpatient  Location: Bedside  Height: 60 in  Weight: 192 lb  BP: 99/ 50 mmHg    Indications: Shortness of breath      Diagnoses: R06 00 - Dyspnea, unspecified, R06 02 - Shortness of breath    Sonographer:  Carly Guo RDCS  Interpreting Physician:  Randy Quiles MD  Referring Physician:  Max Yoo PA-C  Group:  [facility]    SUMMARY    LEFT VENTRICLE:  Systolic function was normal  Ejection fraction was estimated in the range of 55 % to 65 %  There were no regional wall motion abnormalities  There was mild concentric hypertrophy  Doppler parameters were consistent with abnormal left ventricular relaxation (grade 1 diastolic dysfunction)  MITRAL VALVE:  There was mild annular calcification  There was mild regurgitation  AORTIC VALVE:  There was mild regurgitation  TRICUSPID VALVE:  There was mild regurgitation  PULMONIC VALVE:  There was mild regurgitation  HISTORY: PRIOR HISTORY: Elevated trponin, Dementia, Seizures, Stroke, Myocardial infarction  Congestive heart failure  Risk factors: hypertension and hypercholesterolemia  Chronic lung disease  PROCEDURE: The procedure was performed at the bedside  This was a routine study  The transthoracic approach was used  The study included complete 2D imaging, M-mode, complete spectral Doppler, and color Doppler  The heart rate was 68 bpm,  at the start of the study  Images were obtained from the parasternal, apical, subcostal, and suprasternal notch acoustic windows  Echocardiographic views were limited due to poor acoustic window availability, decreased penetration, and  lung interference  This was a technically difficult study  LEFT VENTRICLE: Size was normal  Systolic function was normal  Ejection fraction was estimated in the range of 55 % to 65 %  There were no regional wall motion abnormalities  Wall thickness was normal  There was mild concentric  hypertrophy  DOPPLER: Doppler parameters were consistent with abnormal left ventricular relaxation (grade 1 diastolic dysfunction)      RIGHT VENTRICLE: The size was normal  Systolic function was normal  Wall thickness was normal     LEFT ATRIUM: Size was normal     RIGHT ATRIUM: Size was normal     MITRAL VALVE: There was mild annular calcification  Valve structure was normal  There was normal leaflet separation  DOPPLER: The transmitral velocity was within the normal range  There was no evidence for stenosis  There was mild  regurgitation  AORTIC VALVE: The valve was trileaflet  Leaflets exhibited normal thickness and normal cuspal separation  DOPPLER: Transaortic velocity was within the normal range  There was no evidence for stenosis  There was mild regurgitation  TRICUSPID VALVE: The valve structure was normal  There was normal leaflet separation  DOPPLER: The transtricuspid velocity was within the normal range  There was no evidence for stenosis  There was mild regurgitation  PULMONIC VALVE: Leaflets exhibited normal thickness, no calcification, and normal cuspal separation  DOPPLER: The transpulmonic velocity was within the normal range  There was mild regurgitation  PERICARDIUM: There was no pericardial effusion  The pericardium was normal in appearance  AORTA: The root exhibited normal size      SYSTEM MEASUREMENT TABLES    2D  %FS: 29 2 %  Ao Diam: 3 1 cm  EDV(Teich): 140 8 ml  EF(Teich): 55 6 %  ESV(Teich): 62 5 ml  IVSd: 1 1 cm  LA Area: 20 9 cm2  LA Diam: 3 7 cm  LVEDV MOD A4C: 117 1 ml  LVEF MOD A4C: 50 3 %  LVESV MOD A4C: 58 2 ml  LVIDd: 5 4 cm  LVIDs: 3 8 cm  LVLd A4C: 8 5 cm  LVLs A4C: 7 1 cm  LVPWd: 1 1 cm  RA Area: 17 5 cm2  RVIDd: 3 2 cm  SV MOD A4C: 59 ml  SV(Teich): 78 3 ml    CW  AR Dec [place]: 1 9 m/s2  AR Dec Time: 2208 1 ms  AR PHT: 640 4 ms  AR Vmax: 4 1 m/s  AR maxP 4 mmHg  TR Vmax: 2 5 m/s  TR maxP mmHg    MM  TAPSE: 1 4 cm    PW  AVC: 389 7 ms  E': 0 1 m/s  E/E': 15 3  MV A An[place]: 1 m/s  MV Dec [place]: 3 4 m/s2  MV DecT: 240 3 ms  MV E Anoop: 0 8 m/s  MV E/A Ratio: 0 8  MV PHT: 69 7 ms  MVA By PHT: 3 2 cm2    [facility]    Prepared and electronically signed by    Sara Dubin, MD  Signed 11-Dec-2017 12:09:00         Meds/Allergies   all current active meds have been reviewed and current meds:   Current Facility-Administered Medications   Medication Dose Route Frequency    acetaminophen (TYLENOL) tablet 488 mg  488 mg Oral Q6H PRN    aspirin (ECOTRIN LOW STRENGTH) EC tablet 81 mg  81 mg Oral Daily    atorvastatin (LIPITOR) tablet 10 mg  10 mg Oral Daily    budesonide-formoterol (SYMBICORT) 160-4 5 mcg/act inhaler 2 puff  2 puff Inhalation BID    buPROPion (WELLBUTRIN SR) 12 hr tablet 150 mg  150 mg Oral Daily    carBAMazepine (TEGretol) chewable tablet 100 mg  100 mg Oral TID    cholecalciferol (VITAMIN D3) tablet 2,000 Units  2,000 Units Oral Daily    diltiazem (CARDIZEM SR) 12 hr capsule 120 mg  120 mg Oral Q12H [address]    [START ON 4/19/2018] divalproex sodium (DEPAKOTE ER) 24 hr tablet 250 mg  250 mg Oral Daily    enoxaparin (LOVENOX) subcutaneous injection 30 mg  30 mg Subcutaneous Q24H TONI    fenofibrate (TRICOR) tablet 48 mg  48 mg Oral Daily    ferrous sulfate tablet 325 mg  325 mg Oral BID    furosemide (LASIX) tablet 40 mg  40 mg Oral BID (diuretic)    levalbuterol (XOPENEX) inhalation solution 1 25 mg  1 25 mg Nebulization Q6H PRN    levothyroxine tablet 88 mcg  88 mcg Oral QAM    metoprolol (LOPRESSOR) injection 5 mg  5 mg Intravenous Q6H PRN    metoprolol tartrate (LOPRESSOR) tablet 50 mg  50 mg Oral Q12H [address]    ondansetron (ZOFRAN) injection 4 mg  4 mg Intravenous Q6H PRN    senna-docusate sodium (SENOKOT S) 8 6-50 mg per tablet 1 tablet  1 tablet Oral Daily PRN    warfarin (COUMADIN) tablet 5 mg  5 mg Oral Daily (warfarin)     Prescriptions Prior to Admission   Medication    atorvastatin (LIPITOR) 10 mg tablet    fluticasone furoate-vilanterol (BREO ELLIPTA)    acetaminophen (TYLENOL) 500 mg tablet    albuterol (2 5 mg/3 mL) 0 083 % nebulizer solution    albuterol (PROVENTIL HFA,VENTOLIN HFA) 90 mcg/act inhaler    aspirin (ECOTRIN LOW STRENGTH) 81 mg EC tablet    budesonide-formoterol (SYMBICORT) 160-4 5 mcg/act inhaler    buPROPion (WELLBUTRIN SR) 150 mg 12 hr tablet    carBAMazepine (TEGretol) 100 mg chewable tablet    Cholecalciferol (VITAMIN D) 2000 units CAPS    diltiazem (CARDIZEM) 60 mg tablet    divalproex sodium (DEPAKOTE ER) 250 mg 24 hr tablet    fenofibrate micronized (LOFIBRA) 67 MG capsule    ferrous sulfate 325 (65 Fe) mg tablet    furosemide (LASIX) 40 mg tablet    ipratropium-albuterol (DUO-NEB) 0 5-2 5 mg/mL    Levothyroxine Sodium 88 MCG CAPS    metoprolol tartrate (LOPRESSOR) 50 mg tablet    Sennosides-Docusate Sodium (SENNA PLUS PO)    warfarin (COUMADIN) 2 mg tablet              Assessment and Plan:  Atrial flutter   -Cardizem + Lopressor for rate control  -Coumadin for anticoagulation    Acute on chronic diastolic CHF  -now appears euvolemic, maintained oral Lasix  -continue Lopressor  Consider adding ACEI/ARB   -daily weights, low-sodium diet recommended as outpatient if renal function stable and BP allow    CAD s/p PCI   -patient without anginal symptoms at this time  Continue aspirin, statin, beta-blocker    Hypertension  -stable    Hyperlipidemia  -continue statin    Patient to be discharged cardiac standpoint with follow-up in 1 week    ** Please Note: Dragon 360 Dictation voice to text software may have been used in the creation of this document   **

## 2018-04-18 NOTE — ASSESSMENT & PLAN NOTE
Her a flutter is currently controlled by Cardiology on a Cardizem and she is also been started on Coumadin  Both Coumadin and Depakote are protein bound it may be difficult to keep her Coumadin levels stable competing with the Depakote is she goes into the summer with the possible dietary changes people experience in the summer given fresh fruit and vegetables  Consideration possibly could be given to moving her to 1 of the novel oral anticoagulants  This too can be discussed as an outpatient with the daughter at a later date

## 2018-04-18 NOTE — PLAN OF CARE
Problem: PHYSICAL THERAPY ADULT  Goal: Performs mobility at highest level of function for planned discharge setting  See evaluation for individualized goals  Treatment/Interventions: Functional transfer training, LE strengthening/ROM, Elevations, Therapeutic exercise, Endurance training, Patient/family training, Equipment eval/education, Bed mobility, Gait training, Spoke to nursing, Spoke to case management, OT, Family  Equipment Recommended: Donato (KANDACE)       See flowsheet documentation for full assessment, interventions and recommendations  Outcome: Progressing  Prognosis: Good  Problem List: Decreased strength, Decreased endurance, Impaired balance, Decreased mobility, Decreased cognition, Impaired judgement, Decreased safety awareness  Assessment: Pt seen for PT treatment session this date with interventions consisting of gait training w/ emphasis on improving pt's ability to ambulate level surfaces x 250 ft with SBA provided by therapist with KANDACE, therapeutic activity consisting of training: supine<>sit transfers and sit<>stand transfers and navigating 3 x3 stairs w/ B handrail with step to pattern with CGA  Pt agreeable to PT treatment session upon arrival, pt found supine in bed w/ HOB elevated, in no apparent distress and responsive  In comparison to previous session, pt with improvements in tolerating greater amb distances, and pt able to demonstrate ability to ascend/descend 3 steps, however pt with increased fatigue and CGA for safety  Post session: pt returned back to recliner, chair alarm engaged, all needs in reach and RN notified of session findings/recommendations Continue to recommend STR at time of d/c in order to maximize pt's functional independence and safety w/ mobility  Pt continues to be functioning below baseline level, and remains limited 2* factors listed above   PT will continue to see pt while here in order to address the deficits listed above and provide interventions consistent w/ POC in effort to achieve STGs  Barriers to Discharge: Inaccessible home environment, Decreased caregiver support     Recommendation: Short-term skilled PT (vs return to [facility] if able to do steps)     PT - OK to Discharge: Yes (when medically cleared, if to STR)    See flowsheet documentation for full assessment

## 2018-04-18 NOTE — PHYSICAL THERAPY NOTE
Physical Therapy Treatment Note       04/18/18 0825   Pain Assessment   Pain Assessment No/denies pain   Pain Score No Pain   Restrictions/Precautions   Weight Bearing Precautions Per Order No   Other Precautions Cognitive; Chair Alarm; Bed Alarm; Fall Risk  (baca catheter)   General   Chart Reviewed Yes  (cleared for PT by RN)   Response to Previous Treatment Patient with no complaints from previous session  Family/Caregiver Present No   Cognition   Overall Cognitive Status Impaired   Arousal/Participation Alert; Responsive; Cooperative   Attention Attends with cues to redirect   Orientation Level Oriented to person;Disoriented to place; Disoriented to time;Disoriented to situation   Memory Decreased short term memory;Decreased recall of recent events;Decreased long term memory;Decreased recall of biographical information   Following Commands Follows one step commands with increased time or repetition   Subjective   Subjective "I can try the steps now"   Bed Mobility   Rolling L 5  Supervision   Additional items Assist x 1; Increased time required;Verbal cues   Supine to Sit 5  Supervision   Additional items Assist x 1; Increased time required;Verbal cues   Transfers   Sit to Stand 5  Supervision   Additional items Assist x 1; Impulsive;Verbal cues   Stand to Sit 5  Supervision   Additional items Assist x 1;Verbal cues   Ambulation/Elevation   Gait pattern Decreased foot clearance;Shuffling; Step to; Inconsistent randi; Excessively slow; Improper Weight shift   Gait Assistance 5  Supervision  (primarily SBA)   Additional items Assist x 1;Verbal cues; Tactile cues   Assistive Device Rolling walker   Distance 250'   Stair Management Assistance 4  Minimal assist  (CGA)   Additional items Assist x 1;Verbal cues; Increased time required   Stair Management Technique Two rails; Step to pattern; Foreward   Number of Stairs 3  (x3 times)   Balance   Static Sitting Good   Dynamic Sitting Good   Static Standing Fair +   Dynamic Standing Fair   Ambulatory Fair   Endurance Deficit   Endurance Deficit Yes   Activity Tolerance   Activity Tolerance Patient limited by fatigue   Nurse Made Aware Yes, RN Heidy Garza verblijacqueline pt appropriate for PT session   Assessment   Prognosis Good   Problem List Decreased strength;Decreased endurance; Impaired balance;Decreased mobility; Decreased cognition; Impaired judgement;Decreased safety awareness   Assessment Pt seen for PT treatment session this date with interventions consisting of gait training w/ emphasis on improving pt's ability to ambulate level surfaces x 250 ft with SBA provided by therapist with RW, therapeutic activity consisting of training: supine<>sit transfers and sit<>stand transfers and navigating 3 x3 stairs w/ B handrail with step to pattern with CGA  Pt agreeable to PT treatment session upon arrival, pt found supine in bed w/ HOB elevated, in no apparent distress and responsive  In comparison to previous session, pt with improvements in tolerating greater amb distances, and pt able to demonstrate ability to ascend/descend 3 steps, however pt with increased fatigue and CGA for safety  Post session: pt returned back to recliner, chair alarm engaged, all needs in reach and RN notified of session findings/recommendations Continue to recommend STR at time of d/c in order to maximize pt's functional independence and safety w/ mobility  Pt continues to be functioning below baseline level, and remains limited 2* factors listed above  PT will continue to see pt while here in order to address the deficits listed above and provide interventions consistent w/ POC in effort to achieve STGs     Barriers to Discharge Inaccessible home environment;Decreased caregiver support   Goals   Patient Goals none stated   STG Expiration Date 04/26/18   Short Term Goal #1 STGs remain appropriate   Short Term Goal #2 Pt will ascend/descend 10 steps with use of HR independently   Treatment Day 3   Plan   Treatment/Interventions Functional transfer training;Elevations; Therapeutic exercise; Endurance training;Patient/family training;Equipment eval/education; Bed mobility;Gait training;Spoke to nursing;Spoke to case management;Spoke to MD   Progress Progressing toward goals   PT Frequency 5x/wk   Recommendation   Recommendation Short-term skilled PT  (vs return to [facility] if able to do steps)   Equipment Recommended Walker  (RW)   PT - OK to Discharge Yes  (when medically cleared, if to STR)       Margarita Valverde, PT    Time of PT treatment session: [phone]

## 2018-04-18 NOTE — ASSESSMENT & PLAN NOTE
The patient's daughter reports that she has just been in [facility] geriatric psych unit for mood stabilization and agitation  She reports that they had started her and she appeared to be doing well on Depakote of 250 once a day  The daughter is concerned that sometime during her last hospitalization heard her to her Depakote had been increased back up to 500 on a daily basis and could this be responsible for the lethargy that the daughter feels the patient experienced yesterday on the 17th  I have asked for a Depakote level to be drawn off her initial blood work  I suspect she could be kidney back to 250 on a daily basis  Her mood is quite stable at this time although given her cognitive history these environmental changes of going back and forth from a nursing home to hospital, coupled with the poor sleep, different caretakers, and possibly any significant hemodynamic changes from her AFib or flutter could have been responsible for the transient episode that the daughter was worried about yesterday

## 2018-04-18 NOTE — ASSESSMENT & PLAN NOTE
The patient's daughter reports that her seizure disorder is long-standing  She apparently has been controlled on her Tegretol 100 mg 3 times a day for some years now  Daughter reports she has not had a seizure in quite some time  Her Depakote/divalproex is being used for behavioral and mood stabilization

## 2018-04-18 NOTE — NURSING NOTE
Discharge instructions faxed to [facility]  Scripts given to family    Discharge instructions reviewed with family

## 2018-05-04 ENCOUNTER — OFFICE VISIT (OUTPATIENT)
Dept: CARDIOLOGY CLINIC | Facility: CLINIC | Age: 73
End: 2018-05-04
Payer: MEDICARE

## 2018-05-04 VITALS
OXYGEN SATURATION: 93 % | HEART RATE: 113 BPM | SYSTOLIC BLOOD PRESSURE: 128 MMHG | BODY MASS INDEX: 35.69 KG/M2 | HEIGHT: 60 IN | DIASTOLIC BLOOD PRESSURE: 68 MMHG | WEIGHT: 181.8 LBS

## 2018-05-04 DIAGNOSIS — I48.92 ATRIAL FLUTTER, UNSPECIFIED TYPE (HCC): ICD-10-CM

## 2018-05-04 DIAGNOSIS — E78.2 MIXED HYPERLIPIDEMIA: ICD-10-CM

## 2018-05-04 DIAGNOSIS — G47.33 OSA (OBSTRUCTIVE SLEEP APNEA): Primary | ICD-10-CM

## 2018-05-04 DIAGNOSIS — I70.213 INTERMITTENT CLAUDICATION OF BOTH LOWER EXTREMITIES DUE TO ATHEROSCLEROSIS (HCC): ICD-10-CM

## 2018-05-04 DIAGNOSIS — I50.32 CHRONIC DIASTOLIC CONGESTIVE HEART FAILURE (HCC): ICD-10-CM

## 2018-05-04 DIAGNOSIS — I10 ESSENTIAL HYPERTENSION: Chronic | ICD-10-CM

## 2018-05-04 PROCEDURE — 99214 OFFICE O/P EST MOD 30 MIN: CPT | Performed by: INTERNAL MEDICINE

## 2018-05-04 NOTE — PROGRESS NOTES
Cardiology Consultation     Eduardo Brito  [id number]  1945  [address] [address]    C/C:  FOLLOW-UP OF ATRIAL FLUTTER AND DIASTOLIC CHF EXACERBATION    HPI:  81-year-old female patient with past medical history of coronary artery disease status post PCI was recently admitted to the hospital with diastolic CHF exacerbation and atrial flutter with rapid ventricular response  Since discharge she is doing well and denies having any chest pain, shortness of breath, palpitations, dizziness or syncope  Patient had an episode of hypoxia in the nursing home at night which improved with oxygen  Patient has history of sleep apnea which is untreated at this time  Patient was on CPAP in the past and stopped for some reason  She does complain of leg tightness with exertion relieved with rest, right worse than left leg      Patient Active Problem List   Diagnosis    HTN (hypertension)    [facility]    Acute on chronic diastolic congestive heart failure (HCC)    Acute respiratory failure with hypoxia (HCC)    Atrial flutter (HCC)    Back pain    Dementia    HLD (hyperlipidemia)    UTI (urinary tract infection)    Seizure disorder ([facility]    RSV infection    Ambulatory dysfunction    Hypernatremia    Stage 3 chronic kidney disease    Bilateral leg edema    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     Past Medical History:   Diagnosis Date    Atrial fibrillation ([facility]     Brain aneurysm     CAD (coronary artery disease)     Cardiac disease     had stents 12 years ago in [facility]    CHF (congestive heart failure) ([facility] )     CKD (chronic kidney disease)     [facility]     Dementia     Dementia     Disease of thyroid gland     Hyperlipidemia     Hyperlipidemia     Hypertension     [facility] infarction) [facility]     Migraine     Renal disorder     Seizures ([facility]     Stroke [facility])      Social History     Social History    Marital status:      Spouse name: N/A    Number of children: N/A    Years of education: N/A     Occupational History    Not on file       Social History Main Topics    Smoking status: Former Smoker    Smokeless tobacco: Never Used    Alcohol use No    Drug use: No    Sexual activity: Not on file     Other Topics Concern    Not on file     Social History Narrative    No narrative on file      Family History   Problem Relation Age of Onset    Heart disease Father      Past Surgical History:   Procedure Laterality Date    APPENDECTOMY      BLADDER TUMOR EXCISION      BRAIN SURGERY      CARDIAC SURGERY      CORONARY STENT PLACEMENT      5 stents    EYE SURGERY      MANDIBLE FRACTURE SURGERY      TONSILLECTOMY      TUBAL LIGATION      UTERINE FIBROID SURGERY         Current Outpatient Prescriptions:     acetaminophen (TYLENOL) 500 mg tablet, Take 500 mg by mouth every 6 (six) hours as needed for mild pain, Disp: , Rfl:     albuterol (2 5 mg/3 mL) 0 083 % nebulizer solution, Take 2 5 mg by nebulization every 6 (six) hours as needed for wheezing, Disp: , Rfl:     albuterol (PROVENTIL HFA,VENTOLIN HFA) 90 mcg/act inhaler, Inhale 2 puffs every 6 (six) hours as needed for wheezing, Disp: , Rfl:     aspirin (ECOTRIN LOW STRENGTH) 81 mg EC tablet, Take 81 mg by mouth daily, Disp: , Rfl:     atorvastatin (LIPITOR) 10 mg tablet, Take 10 mg by mouth daily, Disp: , Rfl:     buPROPion (WELLBUTRIN SR) 150 mg 12 hr tablet, Take 150 mg by mouth daily, Disp: , Rfl:     carBAMazepine (TEGretol) 100 mg chewable tablet, Chew 100 mg 3 (three) times a day, Disp: , Rfl:     Cholecalciferol (VITAMIN D) 2000 units CAPS, Take 1 capsule by mouth daily  , Disp: , Rfl:     diltiazem (CARDIZEM SR) 120 mg 12 hr capsule, Take 1 capsule (120 mg total) by mouth every 12 (twelve) hours, Disp: 30 capsule, Rfl: 0    divalproex sodium (DEPAKOTE ER) 250 mg 24 hr tablet, Take 1 tablet (250 mg total) by mouth daily, Disp: 30 tablet, Rfl: 0    fenofibrate micronized (LOFIBRA) 67 MG capsule, Take 67 mg by mouth every morning before breakfast, Disp: , Rfl:     ferrous sulfate 325 (65 Fe) mg tablet, Take 325 mg by mouth 2 (two) times a day, Disp: , Rfl:     fluticasone furoate-vilanterol (BREO ELLIPTA), Inhale 1 puff daily, Disp: , Rfl:     furosemide (LASIX) 40 mg tablet, Take 1 tablet (40 mg total) by mouth 2 (two) times a day, Disp: 30 tablet, Rfl: 0    Levothyroxine Sodium 88 MCG CAPS, Take 88 mcg by mouth every morning  , Disp: , Rfl:     metoprolol tartrate (LOPRESSOR) 50 mg tablet, Take 1 tablet (50 mg total) by mouth every 12 (twelve) hours, Disp: , Rfl: 0    potassium chloride (K-DUR,KLOR-CON) 20 mEq tablet, Take 1 tablet (20 mEq total) by mouth daily, Disp: 15 tablet, Rfl: 0    Sennosides-Docusate Sodium (SENNA PLUS PO), Take by mouth daily as needed, Disp: , Rfl:     warfarin (COUMADIN) 2 mg tablet, Take 1 tablet (2 mg total) by mouth daily Goal INR 2 0-3 0 (Patient taking differently: Take 2 5 mg by mouth daily Goal INR 2 0-3 0 ), Disp: , Rfl: 0  Allergies   Allergen Reactions    Ramipril Anaphylaxis    Spiriva Handihaler [Tiotropium Bromide Monohydrate] Swelling    Penicillins      Vitals:    05/04/18 1336   BP: 128/68   Pulse: (!) 113   SpO2: 93%   Weight: 82 5 kg (181 lb 12 8 oz)   Height: 5' (1 524 m)       Labs:  Admission on 04/13/2018, Discharged on 04/18/2018   Component Date Value    WBC 04/13/2018 6 11     RBC 04/13/2018 3 49*    Hemoglobin 04/13/2018 11 7     Hematocrit 04/13/2018 36 6     MCV 04/13/2018 105*    MCH 04/13/2018 33 5     MCHC 04/13/2018 32 0     RDW 04/13/2018 13 2     MPV 04/13/2018 12 1     Platelets 03/19/9168 139*    nRBC 04/13/2018 0     Neutrophils Relative 04/13/2018 42*    Lymphocytes Relative 04/13/2018 41     Monocytes Relative 04/13/2018 9     Eosinophils Relative 04/13/2018 7*    Basophils Relative 04/13/2018 1     Neutrophils Absolute 04/13/2018 2 58     Lymphocytes Absolute 04/13/2018 2 49     Monocytes Absolute 04/13/2018 0 56     Eosinophils Absolute 04/13/2018 0 41     Basophils Absolute 04/13/2018 0 04     Sodium 04/13/2018 146*    Potassium 04/13/2018 3 7     Chloride 04/13/2018 108     CO2 04/13/2018 30     Anion Gap 04/13/2018 8     BUN 04/13/2018 25     Creatinine 04/13/2018 1 71*    Glucose 04/13/2018 125     Calcium 04/13/2018 9 2     AST 04/13/2018 18     ALT 04/13/2018 20     Alkaline Phosphatase 04/13/2018 57     Total Protein 04/13/2018 7 2     Albumin 04/13/2018 3 4*    Total Bilirubin 04/13/2018 0 30     eGFR 04/13/2018 30     Protime 04/13/2018 20 5*    INR 04/13/2018 1 71*    PTT 04/13/2018 32     NT-proBNP 04/13/2018 5492*    Troponin I 04/13/2018 <0 02     Sodium 04/14/2018 145     Potassium 04/14/2018 3 5     Chloride 04/14/2018 110*    CO2 04/14/2018 30     Anion Gap 04/14/2018 5     BUN 04/14/2018 24     Creatinine 04/14/2018 1 54*    Glucose 04/14/2018 101     Calcium 04/14/2018 9 0     eGFR 04/14/2018 34     Magnesium 04/14/2018 2 0     Phosphorus 04/14/2018 3 8     WBC 04/14/2018 5 71     RBC 04/14/2018 3 24*    Hemoglobin 04/14/2018 10 7*    Hematocrit 04/14/2018 34 0*    MCV 04/14/2018 105*    MCH 04/14/2018 33 0     MCHC 04/14/2018 31 5     RDW 04/14/2018 13 2     Platelets 03/29/2658 121*    MPV 04/14/2018 12 1     Ventricular Rate 04/13/2018 126     Atrial Rate 04/13/2018 129     QRSD Interval 04/13/2018 96     QT Interval 04/13/2018 412     QTC Interval 04/13/2018 596     QRS Axis 04/13/2018 15     T Wave Axis 04/13/2018 64     Protime 04/15/2018 18 3*    INR 04/15/2018 1 48*    Sodium 04/15/2018 142     Potassium 04/15/2018 3 7     Chloride 04/15/2018 103     CO2 04/15/2018 31     Anion Gap 04/15/2018 8     BUN 04/15/2018 25     Creatinine 04/15/2018 1 51*    Glucose 04/15/2018 100     Calcium 04/15/2018 9 8     eGFR 04/15/2018 35     Sodium 04/16/2018 143     Potassium 04/16/2018 3 4*    Chloride 04/16/2018 105     CO2 04/16/2018 32     Anion Gap 04/16/2018 6     BUN 04/16/2018 25     Creatinine 04/16/2018 1 50*    Glucose 04/16/2018 99     Calcium 04/16/2018 9 1     eGFR 04/16/2018 35     Protime 04/16/2018 19 4*    INR 04/16/2018 1 59*    Protime 04/17/2018 24 9*    INR 04/17/2018 2 18*    Protime 04/18/2018 29 1*    INR 04/18/2018 2 66*    Valproic Acid, Total 04/18/2018 35*    Valproic Acid, Total 04/16/2018 36*    Carbamazepine Lvl 04/16/2018 6 5    Admission on 03/22/2018, Discharged on 03/30/2018   No results displayed because visit has over 200 results  Imaging: Vas Lower Limb Venous Duplex Study, Unilateral/limited    Result Date: 4/15/2018  Narrative:  [facility] REPORT [facility]INICAL: Indications: Right Swelling of Limb [R22 4]  The patient states she has had chronic right leg swelling x "a long time " She states it feels heavy, but denies any pain  She is currently taking an anticoagulant  Risk Factors: The patient has history of obesity  H/O: CHF, Dementia, COPD  FINDINGS:  Segment   Right            Left                Impression       Impression       CFV       Normal (Patent)  Normal (Patent)  Peroneal  Not Visualized                       CONCLUSION: Impression: RIGHT LOWER LIMB No evidence of acute deep vein thrombosis   No evidence of superficial thrombophlebitis noted  Doppler evaluation shows a normal response to augmentation maneuvers  Popliteal and posterior tibial arterial Doppler waveforms are biphasic  LEFT LOWER LIMB LIMITED Evaluation shows no evidence of thrombus in the common femoral vein  Doppler evaluation shows a normal response to augmentation maneuvers    SIGNATURE: Electronically Signed by: Yasmani Hardy MD on 2018-04-15 01:53:29 PM      Review of Systems:  Review of Systems   Constitutional: Negative for diaphoresis and fatigue  HENT: Negative for congestion and facial swelling  Eyes: Negative for photophobia and visual disturbance  Respiratory: Positive for shortness of breath  Negative for chest tightness  Cardiovascular: Negative for chest pain, palpitations and leg swelling  Gastrointestinal: Negative for abdominal pain and nausea  Endocrine: Negative for cold intolerance and heat intolerance  Musculoskeletal: Negative for arthralgias and myalgias  Skin: Negative for pallor and rash  Neurological: Negative for dizziness and tremors  Psychiatric/Behavioral: Negative for sleep disturbance  The patient is not nervous/anxious  Physical Exam:  Physical Exam   Constitutional: She is oriented to person, place, and time  She appears well-developed and well-nourished  HENT:   Head: Normocephalic and atraumatic  Eyes: Conjunctivae and EOM are normal  Pupils are equal, round, and reactive to light  Neck: Normal range of motion  Neck supple  No JVD present  No thyromegaly present  Cardiovascular: Normal rate, S1 normal, S2 normal, normal heart sounds and intact distal pulses  An irregularly irregular rhythm present  Exam reveals no gallop and no friction rub  No murmur heard  Pulses:       Carotid pulses are 2+ on the right side, and 2+ on the left side  Pulmonary/Chest: Effort normal and breath sounds normal  No respiratory distress  She has no wheezes  She has no rales  Abdominal: Soft  Bowel sounds are normal  She exhibits no distension  There is no tenderness  There is no rebound and no guarding  Musculoskeletal: Normal range of motion  She exhibits no edema  Neurological: She is alert and oriented to person, place, and time  She has normal reflexes  No cranial nerve deficit  Skin: Skin is warm and dry  Psychiatric: She has a normal mood and affect         Discussion/Summary:  Atrial flutter :  RATE CONTROLLED  -Cardizem + Lopressor for rate control  -Coumadin for anticoagulation     Acute on chronic diastolic CHF  -now appears euvolemic, maintained oral Lasix  -continue Lopressor    -daily weights, low-sodium diet recommended as outpatient if renal function stable and BP allow     CAD s/p PCI   -patient without anginal symptoms at this time    Continue aspirin, statin, beta-blocker     Hypertension  -stable     Hyperlipidemia  -continue statin    Sleep apnea:  Untreated, will refer to Dr Lee Light for sleep study    Bilateral lower extremity claudication:  I will get arterial Doppler to evaluate for significant P 80

## 2018-05-07 ENCOUNTER — APPOINTMENT (INPATIENT)
Dept: RADIOLOGY | Facility: HOSPITAL | Age: 73
DRG: 308 | End: 2018-05-07
Payer: MEDICARE

## 2018-05-07 ENCOUNTER — APPOINTMENT (EMERGENCY)
Dept: RADIOLOGY | Facility: HOSPITAL | Age: 73
DRG: 308 | End: 2018-05-07
Payer: MEDICARE

## 2018-05-07 ENCOUNTER — HOSPITAL ENCOUNTER (INPATIENT)
Facility: HOSPITAL | Age: 73
LOS: 5 days | Discharge: NURSING, REHAB OR LONG-TERM CARE | DRG: 308 | End: 2018-05-12
Attending: EMERGENCY MEDICINE | Admitting: FAMILY MEDICINE
Payer: MEDICARE

## 2018-05-07 DIAGNOSIS — I48.91 ATRIAL FIBRILLATION (HCC): ICD-10-CM

## 2018-05-07 DIAGNOSIS — J44.9 CHRONIC OBSTRUCTIVE PULMONARY DISEASE, UNSPECIFIED COPD TYPE (HCC): Chronic | ICD-10-CM

## 2018-05-07 DIAGNOSIS — I10 ESSENTIAL HYPERTENSION: Chronic | ICD-10-CM

## 2018-05-07 DIAGNOSIS — I50.32 CHRONIC DIASTOLIC CONGESTIVE HEART FAILURE (HCC): ICD-10-CM

## 2018-05-07 DIAGNOSIS — I48.92 ATRIAL FLUTTER WITH RAPID VENTRICULAR RESPONSE (HCC): Primary | ICD-10-CM

## 2018-05-07 DIAGNOSIS — N17.9 AKI (ACUTE KIDNEY INJURY) (HCC): ICD-10-CM

## 2018-05-07 LAB
ALBUMIN SERPL BCP-MCNC: 3.2 G/DL (ref 3.5–5)
ALP SERPL-CCNC: 51 U/L (ref 46–116)
ALT SERPL W P-5'-P-CCNC: 34 U/L (ref 12–78)
ANION GAP SERPL CALCULATED.3IONS-SCNC: 6 MMOL/L (ref 4–13)
APTT PPP: 37 SECONDS (ref 23–35)
ARTERIAL PATENCY WRIST A: YES
AST SERPL W P-5'-P-CCNC: 25 U/L (ref 5–45)
ATRIAL RATE: 122 BPM
ATRIAL RATE: 256 BPM
BACTERIA UR QL AUTO: ABNORMAL /HPF
BASE EXCESS BLDA CALC-SCNC: -2.2 MMOL/L
BASOPHILS # BLD AUTO: 0.03 THOUSANDS/ΜL (ref 0–0.1)
BASOPHILS NFR BLD AUTO: 1 % (ref 0–1)
BILIRUB SERPL-MCNC: 0.5 MG/DL (ref 0.2–1)
BILIRUB UR QL STRIP: NEGATIVE
BUN SERPL-MCNC: 32 MG/DL (ref 5–25)
CALCIUM SERPL-MCNC: 9.2 MG/DL (ref 8.3–10.1)
CHLORIDE SERPL-SCNC: 108 MMOL/L (ref 100–108)
CLARITY UR: CLEAR
CO2 SERPL-SCNC: 32 MMOL/L (ref 21–32)
COLOR UR: YELLOW
CREAT SERPL-MCNC: 1.95 MG/DL (ref 0.6–1.3)
DEPRECATED D DIMER PPP: 477 NG/ML (FEU) (ref 0–424)
EOSINOPHIL # BLD AUTO: 0.33 THOUSAND/ΜL (ref 0–0.61)
EOSINOPHIL NFR BLD AUTO: 6 % (ref 0–6)
ERYTHROCYTE [DISTWIDTH] IN BLOOD BY AUTOMATED COUNT: 12.9 % (ref 11.6–15.1)
GFR SERPL CREATININE-BSD FRML MDRD: 25 ML/MIN/1.73SQ M
GLUCOSE SERPL-MCNC: 187 MG/DL (ref 65–140)
GLUCOSE UR STRIP-MCNC: NEGATIVE MG/DL
HCO3 BLDA-SCNC: 22.8 MMOL/L (ref 22–28)
HCT VFR BLD AUTO: 35.2 % (ref 34.8–46.1)
HGB BLD-MCNC: 11.3 G/DL (ref 11.5–15.4)
HGB UR QL STRIP.AUTO: ABNORMAL
INR PPP: 1.8 (ref 0.86–1.16)
KETONES UR STRIP-MCNC: NEGATIVE MG/DL
LEUKOCYTE ESTERASE UR QL STRIP: ABNORMAL
LYMPHOCYTES # BLD AUTO: 1.03 THOUSANDS/ΜL (ref 0.6–4.47)
LYMPHOCYTES NFR BLD AUTO: 18 % (ref 14–44)
MCH RBC QN AUTO: 33.6 PG (ref 26.8–34.3)
MCHC RBC AUTO-ENTMCNC: 32.1 G/DL (ref 31.4–37.4)
MCV RBC AUTO: 105 FL (ref 82–98)
MONOCYTES # BLD AUTO: 0.56 THOUSAND/ΜL (ref 0.17–1.22)
MONOCYTES NFR BLD AUTO: 10 % (ref 4–12)
NEUTROPHILS # BLD AUTO: 3.65 THOUSANDS/ΜL (ref 1.85–7.62)
NEUTS SEG NFR BLD AUTO: 65 % (ref 43–75)
NITRITE UR QL STRIP: POSITIVE
NON-SQ EPI CELLS URNS QL MICRO: ABNORMAL /HPF
NRBC BLD AUTO-RTO: 0 /100 WBCS
NT-PROBNP SERPL-MCNC: 3327 PG/ML
O2 CT BLDA-SCNC: 17.1 ML/DL (ref 16–23)
OXYHGB MFR BLDA: 86.7 % (ref 94–97)
P AXIS: 74 DEGREES
PCO2 BLDA: 39.9 MM HG (ref 36–44)
PH BLDA: 7.37 [PH] (ref 7.35–7.45)
PH UR STRIP.AUTO: 7 [PH] (ref 4.5–8)
PLATELET # BLD AUTO: 163 THOUSANDS/UL (ref 149–390)
PMV BLD AUTO: 11.9 FL (ref 8.9–12.7)
PO2 BLDA: 59.3 MM HG (ref 75–129)
POTASSIUM SERPL-SCNC: 3.6 MMOL/L (ref 3.5–5.3)
PR INTERVAL: 172 MS
PROT SERPL-MCNC: 7.1 G/DL (ref 6.4–8.2)
PROT UR STRIP-MCNC: NEGATIVE MG/DL
PROTHROMBIN TIME: 21.4 SECONDS (ref 12.1–14.4)
QRS AXIS: 10 DEGREES
QRS AXIS: 11 DEGREES
QRSD INTERVAL: 102 MS
QRSD INTERVAL: 96 MS
QT INTERVAL: 252 MS
QT INTERVAL: 302 MS
QTC INTERVAL: 326 MS
QTC INTERVAL: 430 MS
RBC # BLD AUTO: 3.36 MILLION/UL (ref 3.81–5.12)
RBC #/AREA URNS AUTO: ABNORMAL /HPF
SODIUM SERPL-SCNC: 146 MMOL/L (ref 136–145)
SP GR UR STRIP.AUTO: 1.01 (ref 1–1.03)
SPECIMEN SOURCE: ABNORMAL
T WAVE AXIS: 113 DEGREES
T WAVE AXIS: 199 DEGREES
TROPONIN I SERPL-MCNC: <0.02 NG/ML
UROBILINOGEN UR QL STRIP.AUTO: 0.2 E.U./DL
VENTRICULAR RATE: 101 BPM
VENTRICULAR RATE: 122 BPM
WBC # BLD AUTO: 5.63 THOUSAND/UL (ref 4.31–10.16)
WBC #/AREA URNS AUTO: ABNORMAL /HPF

## 2018-05-07 PROCEDURE — 81001 URINALYSIS AUTO W/SCOPE: CPT | Performed by: EMERGENCY MEDICINE

## 2018-05-07 PROCEDURE — 94660 CPAP INITIATION&MGMT: CPT

## 2018-05-07 PROCEDURE — 85379 FIBRIN DEGRADATION QUANT: CPT | Performed by: EMERGENCY MEDICINE

## 2018-05-07 PROCEDURE — 96360 HYDRATION IV INFUSION INIT: CPT

## 2018-05-07 PROCEDURE — 80053 COMPREHEN METABOLIC PANEL: CPT | Performed by: EMERGENCY MEDICINE

## 2018-05-07 PROCEDURE — 85730 THROMBOPLASTIN TIME PARTIAL: CPT | Performed by: EMERGENCY MEDICINE

## 2018-05-07 PROCEDURE — 99285 EMERGENCY DEPT VISIT HI MDM: CPT

## 2018-05-07 PROCEDURE — 36415 COLL VENOUS BLD VENIPUNCTURE: CPT | Performed by: EMERGENCY MEDICINE

## 2018-05-07 PROCEDURE — 83880 ASSAY OF NATRIURETIC PEPTIDE: CPT | Performed by: EMERGENCY MEDICINE

## 2018-05-07 PROCEDURE — 93010 ELECTROCARDIOGRAM REPORT: CPT | Performed by: INTERNAL MEDICINE

## 2018-05-07 PROCEDURE — 99222 1ST HOSP IP/OBS MODERATE 55: CPT | Performed by: INTERNAL MEDICINE

## 2018-05-07 PROCEDURE — 71045 X-RAY EXAM CHEST 1 VIEW: CPT

## 2018-05-07 PROCEDURE — 82805 BLOOD GASES W/O2 SATURATION: CPT | Performed by: PHYSICIAN ASSISTANT

## 2018-05-07 PROCEDURE — 99223 1ST HOSP IP/OBS HIGH 75: CPT | Performed by: FAMILY MEDICINE

## 2018-05-07 PROCEDURE — 94760 N-INVAS EAR/PLS OXIMETRY 1: CPT

## 2018-05-07 PROCEDURE — 93005 ELECTROCARDIOGRAM TRACING: CPT

## 2018-05-07 PROCEDURE — 85025 COMPLETE CBC W/AUTO DIFF WBC: CPT | Performed by: EMERGENCY MEDICINE

## 2018-05-07 PROCEDURE — 94762 N-INVAS EAR/PLS OXIMTRY CONT: CPT

## 2018-05-07 PROCEDURE — 71046 X-RAY EXAM CHEST 2 VIEWS: CPT

## 2018-05-07 PROCEDURE — 99223 1ST HOSP IP/OBS HIGH 75: CPT | Performed by: INTERNAL MEDICINE

## 2018-05-07 PROCEDURE — 85610 PROTHROMBIN TIME: CPT | Performed by: EMERGENCY MEDICINE

## 2018-05-07 PROCEDURE — 84484 ASSAY OF TROPONIN QUANT: CPT | Performed by: EMERGENCY MEDICINE

## 2018-05-07 RX ORDER — AMOXICILLIN 250 MG
1 CAPSULE ORAL
Status: DISCONTINUED | OUTPATIENT
Start: 2018-05-07 | End: 2018-05-12 | Stop reason: HOSPADM

## 2018-05-07 RX ORDER — METOPROLOL TARTRATE 50 MG/1
50 TABLET, FILM COATED ORAL EVERY 12 HOURS SCHEDULED
Status: DISCONTINUED | OUTPATIENT
Start: 2018-05-07 | End: 2018-05-07

## 2018-05-07 RX ORDER — WARFARIN SODIUM 2.5 MG/1
2.5 TABLET ORAL
Status: DISCONTINUED | OUTPATIENT
Start: 2018-05-07 | End: 2018-05-11

## 2018-05-07 RX ORDER — METOPROLOL TARTRATE 5 MG/5ML
5 INJECTION INTRAVENOUS EVERY 6 HOURS PRN
Status: DISCONTINUED | OUTPATIENT
Start: 2018-05-07 | End: 2018-05-12 | Stop reason: HOSPADM

## 2018-05-07 RX ORDER — BUPROPION HYDROCHLORIDE 150 MG/1
150 TABLET, EXTENDED RELEASE ORAL DAILY
Status: DISCONTINUED | OUTPATIENT
Start: 2018-05-08 | End: 2018-05-12 | Stop reason: HOSPADM

## 2018-05-07 RX ORDER — FUROSEMIDE 10 MG/ML
40 INJECTION INTRAMUSCULAR; INTRAVENOUS ONCE
Status: COMPLETED | OUTPATIENT
Start: 2018-05-07 | End: 2018-05-07

## 2018-05-07 RX ORDER — METOPROLOL TARTRATE 50 MG/1
50 TABLET, FILM COATED ORAL EVERY 6 HOURS
Status: DISCONTINUED | OUTPATIENT
Start: 2018-05-07 | End: 2018-05-10

## 2018-05-07 RX ORDER — FUROSEMIDE 40 MG/1
40 TABLET ORAL DAILY
Status: DISCONTINUED | OUTPATIENT
Start: 2018-05-08 | End: 2018-05-08

## 2018-05-07 RX ORDER — FUROSEMIDE 40 MG/1
40 TABLET ORAL 2 TIMES DAILY
Status: DISCONTINUED | OUTPATIENT
Start: 2018-05-07 | End: 2018-05-07

## 2018-05-07 RX ORDER — DILTIAZEM HYDROCHLORIDE 60 MG/1
120 CAPSULE, EXTENDED RELEASE ORAL EVERY 12 HOURS SCHEDULED
Status: DISCONTINUED | OUTPATIENT
Start: 2018-05-07 | End: 2018-05-09

## 2018-05-07 RX ORDER — NITROGLYCERIN 0.4 MG/1
TABLET SUBLINGUAL
Status: COMPLETED
Start: 2018-05-07 | End: 2018-05-07

## 2018-05-07 RX ORDER — ASPIRIN 81 MG/1
81 TABLET ORAL DAILY
Status: DISCONTINUED | OUTPATIENT
Start: 2018-05-08 | End: 2018-05-12 | Stop reason: HOSPADM

## 2018-05-07 RX ORDER — LEVOTHYROXINE SODIUM 88 UG/1
88 TABLET ORAL
Status: DISCONTINUED | OUTPATIENT
Start: 2018-05-08 | End: 2018-05-12 | Stop reason: HOSPADM

## 2018-05-07 RX ORDER — ALBUTEROL SULFATE 90 UG/1
2 AEROSOL, METERED RESPIRATORY (INHALATION) EVERY 6 HOURS PRN
Status: DISCONTINUED | OUTPATIENT
Start: 2018-05-07 | End: 2018-05-12 | Stop reason: HOSPADM

## 2018-05-07 RX ORDER — NITROGLYCERIN 0.4 MG/1
0.4 TABLET SUBLINGUAL
Status: DISCONTINUED | OUTPATIENT
Start: 2018-05-07 | End: 2018-05-12 | Stop reason: HOSPADM

## 2018-05-07 RX ORDER — ACETAMINOPHEN 325 MG/1
500 TABLET ORAL EVERY 6 HOURS PRN
Status: DISCONTINUED | OUTPATIENT
Start: 2018-05-07 | End: 2018-05-12 | Stop reason: HOSPADM

## 2018-05-07 RX ORDER — DIVALPROEX SODIUM 250 MG/1
250 TABLET, EXTENDED RELEASE ORAL DAILY
Status: DISCONTINUED | OUTPATIENT
Start: 2018-05-08 | End: 2018-05-12 | Stop reason: HOSPADM

## 2018-05-07 RX ORDER — HEPARIN SODIUM 5000 [USP'U]/ML
5000 INJECTION, SOLUTION INTRAVENOUS; SUBCUTANEOUS EVERY 8 HOURS SCHEDULED
Status: DISCONTINUED | OUTPATIENT
Start: 2018-05-07 | End: 2018-05-11

## 2018-05-07 RX ORDER — ATORVASTATIN CALCIUM 10 MG/1
10 TABLET, FILM COATED ORAL DAILY
Status: DISCONTINUED | OUTPATIENT
Start: 2018-05-08 | End: 2018-05-12 | Stop reason: HOSPADM

## 2018-05-07 RX ORDER — CARBAMAZEPINE 100 MG/1
100 TABLET, CHEWABLE ORAL 3 TIMES DAILY
Status: DISCONTINUED | OUTPATIENT
Start: 2018-05-07 | End: 2018-05-12 | Stop reason: HOSPADM

## 2018-05-07 RX ORDER — POTASSIUM CHLORIDE 20 MEQ/1
20 TABLET, EXTENDED RELEASE ORAL DAILY
Status: DISCONTINUED | OUTPATIENT
Start: 2018-05-07 | End: 2018-05-12 | Stop reason: HOSPADM

## 2018-05-07 RX ORDER — MORPHINE SULFATE 2 MG/ML
2 INJECTION, SOLUTION INTRAMUSCULAR; INTRAVENOUS ONCE
Status: COMPLETED | OUTPATIENT
Start: 2018-05-07 | End: 2018-05-07

## 2018-05-07 RX ORDER — ALBUTEROL SULFATE 2.5 MG/3ML
2.5 SOLUTION RESPIRATORY (INHALATION) EVERY 6 HOURS PRN
Status: DISCONTINUED | OUTPATIENT
Start: 2018-05-07 | End: 2018-05-12 | Stop reason: HOSPADM

## 2018-05-07 RX ORDER — FERROUS SULFATE 325(65) MG
325 TABLET ORAL 2 TIMES DAILY
Status: DISCONTINUED | OUTPATIENT
Start: 2018-05-07 | End: 2018-05-12 | Stop reason: HOSPADM

## 2018-05-07 RX ADMIN — HEPARIN SODIUM 5000 UNITS: 5000 INJECTION, SOLUTION INTRAVENOUS; SUBCUTANEOUS at 22:38

## 2018-05-07 RX ADMIN — NITROGLYCERIN 0.4 MG: 0.4 TABLET SUBLINGUAL at 19:58

## 2018-05-07 RX ADMIN — DILTIAZEM HYDROCHLORIDE 120 MG: 60 CAPSULE, EXTENDED RELEASE ORAL at 22:39

## 2018-05-07 RX ADMIN — HEPARIN SODIUM 5000 UNITS: 5000 INJECTION, SOLUTION INTRAVENOUS; SUBCUTANEOUS at 15:30

## 2018-05-07 RX ADMIN — WARFARIN SODIUM 2.5 MG: 2.5 TABLET ORAL at 17:07

## 2018-05-07 RX ADMIN — FUROSEMIDE 40 MG: 10 INJECTION, SOLUTION INTRAMUSCULAR; INTRAVENOUS at 23:56

## 2018-05-07 RX ADMIN — METOPROLOL TARTRATE 5 MG: 5 INJECTION, SOLUTION INTRAVENOUS at 18:59

## 2018-05-07 RX ADMIN — CARBAMAZEPINE 100 MG: 100 TABLET, CHEWABLE ORAL at 15:30

## 2018-05-07 RX ADMIN — FLUTICASONE PROPIONATE AND SALMETEROL 1 PUFF: 50; 100 POWDER RESPIRATORY (INHALATION) at 22:39

## 2018-05-07 RX ADMIN — POTASSIUM CHLORIDE 20 MEQ: 1500 TABLET, EXTENDED RELEASE ORAL at 15:30

## 2018-05-07 RX ADMIN — FUROSEMIDE 40 MG: 10 INJECTION, SOLUTION INTRAMUSCULAR; INTRAVENOUS at 19:59

## 2018-05-07 RX ADMIN — METOPROLOL TARTRATE 50 MG: 50 TABLET ORAL at 22:38

## 2018-05-07 RX ADMIN — METOPROLOL TARTRATE 50 MG: 50 TABLET ORAL at 15:30

## 2018-05-07 RX ADMIN — STANDARDIZED SENNA CONCENTRATE AND DOCUSATE SODIUM 1 TABLET: 8.6; 5 TABLET, FILM COATED ORAL at 22:38

## 2018-05-07 RX ADMIN — SODIUM CHLORIDE 500 ML: 0.9 INJECTION, SOLUTION INTRAVENOUS at 10:32

## 2018-05-07 RX ADMIN — CARBAMAZEPINE 100 MG: 100 TABLET, CHEWABLE ORAL at 22:38

## 2018-05-07 RX ADMIN — MORPHINE SULFATE 2 MG: 2 INJECTION, SOLUTION INTRAMUSCULAR; INTRAVENOUS at 19:59

## 2018-05-07 RX ADMIN — Medication 325 MG: at 17:07

## 2018-05-07 NOTE — CONSULTS
Consultation - Pulmonary Medicine   Jameson Carroll 71 y o  female MRN: [id number]  Unit/Bed#: -01 Encounter: [id number]      Assessment:  1  A flutter with RVR  2  Hypoxia  3  COPD without acute exacerbation  4  Obesity, possible JESENIA    Plan:   1  A flutter with RVR + hypoxia  -cardiology following, increased Lopressor to 50 mg Q 6  -currently saturating 93% on room air (documented room air saturation of 89% on admission likely related to above)  -CXR with no acute pulmonary process  -Troponin normal   -agree with overnight pulse ox testing  2  COPD without acute exacerbation  -reviewed PFTs completed at [facility] with mildly decreased FVC and DLCO; normal FEV1/FVC ratio; no obstructive limitation  -patient's daughter states she is not on any maintenance inhalers at home? if she has not been using any maintenance inhalers, can likely continue to monitor without any given spirometry findings on PFTs and scattered use of rescue inhaler  -for now, continue advair (Possibly on Breo as an outpatient?)  -continue p r n  neb treatments/rescue inhaler as needed  3  Obesity, possible JESENIA  -recomend weight loss  -would recommend outpatient in-lab sleep study especially given recurrent bouts of AFib/a flutter; discussed with daughter    History of Present Illness   Physician Requesting Consult: Melissa Zhang MD  Reason for Consult / Principal Problem: A Flutter  Hx and PE limited by: dementia   HPI: Jameson Carroll is a 71y o  year old demented female former smoker who quit 12 years ago with at least a 79 pack-year smoking history with significant past medical history of AFib, CAD s/p PCI, CHF, CKD, COPD, stroke, HLD who originally presented to the ED for high pulse rate up to the 150s  Patient's daughter is at bedside to provide history  Associated pallor and pain in the back  Patient has had multiple admissions for similar issues within the last several months    She was 1st diagnosed with AFib in March of 2018  Patient was started on Coumadin, Cardizem, and Lopressor and achieved rate control  She was also treated for hypoxic respiratory failure 2/2 acute CHF and managed in the ICU  Patient follows up [facility] and was referred to see Dr Julian Trejo as an outpatient for evaluation possible sleep apnea given recurrent bouts of AFib  Patient's daughter reports she was on nocturnal oxygen years ago after having pneumonia  However, she followed up with one of her doctors and was told to discontinue this  She has not used oxygen since  Patient has never had a sleep study  Her last PFTs were completed within the last year  She does not use inhalers on a daily basis and uses them only as needed  Inpatient consult to Pulmonology  Consult performed by: Viktoria Chauhan ordered by: Zeyad Hughes          Review of Systems   Unable to perform ROS: Dementia   Constitutional: Negative for chills, fatigue and fever  Respiratory: Positive for shortness of breath  Cardiovascular: Positive for palpitations and leg swelling  Gastrointestinal: Negative for abdominal pain  Musculoskeletal: Positive for back pain  Skin: Positive for pallor         Historical Information   Past Medical History:   Diagnosis Date    [facility] )     Brain aneurysm     CAD (coronary artery disease)     Cardiac disease     had stents 12 years ago in [address] [place] [address] CHF (congestive heart failure) (HCC)     CKD (chronic kidney disease)     COPD (chronic obstructive pulmonary disease) (HCC)     Dementia     Dementia     Disease of thyroid gland     Hyperlipidemia     Hyperlipidemia     Hypertension     [facility]     Migraine     Renal disorder     Seizures [facility]     Stroke ([facility] )      Past Surgical History:   Procedure Laterality Date    APPENDECTOMY      BLADDER TUMOR EXCISION      BRAIN SURGERY      CARDIAC SURGERY      CORONARY STENT PLACEMENT 5 stents    EYE SURGERY      MANDIBLE FRACTURE SURGERY      TONSILLECTOMY      TUBAL LIGATION      UTERINE FIBROID SURGERY       Social History   History   Alcohol Use No     History   Drug Use No     History   Smoking Status    Former Smoker   Smokeless Tobacco    Never Used     Occupational History:     Family History: non-contributory    Meds/Allergies   all current active meds have been reviewed    Allergies   Allergen Reactions    Ramipril Anaphylaxis    Spiriva Handihaler [Tiotropium Bromide Monohydrate] Swelling    Penicillins        Objective   Vitals: Blood pressure 122/72, pulse (!) 123, temperature 98 6 °F (37 °C), temperature source Oral, resp  rate (!) 24, height 5' 2" (1 575 m), weight 81 6 kg (179 lb 14 3 oz), SpO2 93 %  ,Body mass index is 32 9 kg/m²  Intake/Output Summary (Last 24 hours) at 05/07/18 1548  Last data filed at 05/07/18 1132   Gross per 24 hour   Intake              500 ml   Output                0 ml   Net              500 ml     Invasive Devices     Peripheral Intravenous Line            Peripheral IV 05/07/18 Right Antecubital less than 1 day          Drain            Urethral Catheter Latex 16 Fr  23 days                Physical Exam   Constitutional: She is oriented to person, place, and time  She appears well-developed  Obese   HENT:   Head: Normocephalic and atraumatic  Neck: Normal range of motion  Cardiovascular: Regular rhythm  No murmur heard  Tachycardic   Pulmonary/Chest: Effort normal and breath sounds normal    Abdominal: Soft  There is no tenderness  Musculoskeletal: Normal range of motion  She exhibits edema  She exhibits no tenderness  Lymphadenopathy:     She has no cervical adenopathy  Neurological: She is alert and oriented to person, place, and time  Skin: Skin is warm and dry  Psychiatric: She has a normal mood and affect  Her behavior is normal    Nursing note and vitals reviewed        Lab Results:   I have personally reviewed pertinent lab results  , CBC:   Lab Results   Component Value Date    WBC 5 63 05/07/2018    HGB 11 3 (L) 05/07/2018    HCT 35 2 05/07/2018     (H) 05/07/2018     05/07/2018    MCH 33 6 05/07/2018    MCHC 32 1 05/07/2018    RDW 12 9 05/07/2018    MPV 11 9 05/07/2018    NRBC 0 05/07/2018   , CMP:   Lab Results   Component Value Date     (H) 05/07/2018    K 3 6 05/07/2018     05/07/2018    CO2 32 05/07/2018    ANIONGAP 6 05/07/2018    BUN 32 (H) 05/07/2018    CREATININE 1 95 (H) 05/07/2018    GLUCOSE 187 (H) 05/07/2018    CALCIUM 9 2 05/07/2018    AST 25 05/07/2018    ALT 34 05/07/2018    ALKPHOS 51 05/07/2018    PROT 7 1 05/07/2018    BILITOT 0 50 05/07/2018    EGFR 25 05/07/2018     Imaging Studies: I have personally reviewed pertinent reports  EKG, Pathology, and Other Studies: I have personally reviewed pertinent reports  VTE Prophylaxis: Warfarin (Coumadin)    Code Status: Level 3 - DNAR and DNI  Advance Directive and Living Will: Yes    Power of : Yes  POLST:      Counseling/Coordination of Care: Total floor / unit time spent today 35 minutes  Greater than 50% of total time was spent with the patient and / or family counseling and / or coordination of care   A description of the counseling / coordination of care:  Discussion with attending physician, discussion with SLIM, review of diagnostic studies in detail with patient and family, review COPD treatment regimen, discussion of proper inhaler technique minutes

## 2018-05-07 NOTE — PLAN OF CARE
Problem: DISCHARGE PLANNING - CARE MANAGEMENT  Goal: Discharge to post-acute care or home with appropriate resources  INTERVENTIONS:  - Conduct assessment to determine patient/family and health care team treatment goals, and need for post-acute services based on payer coverage, community resources, and patient preferences, and barriers to discharge  - Address psychosocial, clinical, and financial barriers to discharge as identified in assessment in conjunction with the patient/family and health care team  - Arrange appropriate level of post-acute services according to patients   needs and preference and payer coverage in collaboration with the physician and health care team  - Communicate with and update the patient/family, physician, and health care team regarding progress on the discharge plan  - Arrange appropriate transportation to post-acute venues  Outcome: Progressing  CM met with pt and daughter Lorelei Ramos at bedside  Pt resides at [facility] and is assisted with ADL's by the staff  She uses a walker to ambulate  She is current with [facility] and has been in [facility] and [facility] for rehab in the past   Her cardiologist is Dr Sunitha Estes and her PCP is the house doctor at [facility]  [facility] uses [employer] and there is no issues with the co-pays  Pt has an Advanced Directive and her POA is her daughter Lorelei Ramos  Denies substance abuse, tobacco abuse, or mental health issues  She does have dementia  Daughter transports to all appointments and will transport back to [place] on d/c  CM will also place referral to [facility] to resume services when pt is medically cleared  CM department will continue to follow through hospitalization    CM reviewed discharge planning process including the following: identifying help at home, patient preference for discharge planning needs, pharmacy preference, and availability of treatment team to discuss questions or concerns patient and/or family may have regarding understanding medications and recognizing signs and symptoms once discharged  CM also encouraged patient to follow up with all recommended appointments after discharge  Patient advised of importance for patient and family to participate in managing patients medical well being  CM name and role reviewed and Discharge Checklist provided  Encouraged patient and caregiver to review prior to discharge

## 2018-05-07 NOTE — ASSESSMENT & PLAN NOTE
· CKD stage 3 with a baseline creatinine of 1 5  · Patient appears to have an acute kidney injury on CKD  · Patient was given a gentle bolus of 500 cc in the emergency department  · Monitor closely as patient is typically on Lasix will hold her p m  dose for tonight   · Will check a BMP in the a m

## 2018-05-07 NOTE — H&P
H&P- [facility] 7/76/1947, 71 y o  female MRN: [id number]    Unit/Bed#: -01 Encounter: [id number]    Primary Care Provider: Riddhi Perales DO   Date and time admitted to hospital: 5/7/2018  9:59 AM        * Atrial fibrillation ([address] )   Assessment & Plan    · Known history with recent diagnosis patient is currently showing on ECG and telemetry atrial flutter with RVR   · Patient presented with some intermittent RVR in the ED  · Continue home dose of Cardizem and metoprolol  · Will add p r n  metoprolol 5 mg for sustained rate greater than 120  · Patient is on Coumadin her INR is subtherapeutic at 1 8 will start subq heparin to bridge until her INR becomes therapeutic  · Will continue home dose of Coumadin repeat INR in a m  · Telemetry monitoring  · Cardiology consult  · Patient is known to Dr Lorenzo Carranza there is concern sleep apnea could be contributing to patient's arrhythmia  Will do continuous pulse ox at bedtime tonight apply oxygen as needed and will consult pulmonology given this is the patient's 3rd admission for AFib/atrial flutter        Stage 3 chronic kidney disease   Assessment & Plan    · CKD stage 3 with a baseline creatinine of 1 5  · Patient appears to have an acute kidney injury on CKD  · Patient was given a gentle bolus of 500 cc in the emergency department  · Monitor closely as patient is typically on Lasix will hold her p m  dose for tonight   · Will check a BMP in the a m          Chronic diastolic congestive heart failure (HCC)   Assessment & Plan    · BNP BMP is greater than 3000 however this is less than 1 was in April patient is not showing clinical signs of volume overload  · Chest x-ray is not showing evidence of vascular congestion  · Will continue home dose of Lasix and potassium        COPD (chronic obstructive pulmonary disease) (HCC)   Assessment & Plan    · Does not appear in acute exacerbation  · Patient did have mild hypoxia in the ED this overall improved with oxygen  · Respiratory protocol  · Continue p r n  nebulizers        Dementia   Assessment & Plan    · Comfort measures        HTN (hypertension)   Assessment & Plan    · Blood pressure soft  · Continue current regimen with parameters        Seizure disorder (HCC)   Assessment & Plan    · Seizure precautions  · Continue Tegretol and Depakote        Ambulatory dysfunction   Assessment & Plan    · Out of bed with assistance  · Bed alarm for safety   · PT/OT evaluation            VTE Prophylaxis: Warfarin (Coumadin) bridge with subQ heparin given subtherapeutic INR / sequential compression device   Code Status:  DNR DNI  POLST: POLST is not applicable to this patient  Discussion with family:  Daughter at bedside    Anticipated Length of Stay:  Patient will be admitted on an Inpatient basis with an anticipated length of stay of  > 2 midnights  Justification for Hospital Stay:  Atrial fib/atrial flutter with RVR with need for further acute intervention    Total Time for Visit, including Counseling / Coordination of Care: 45 minutes  Greater than 50% of this total time spent on direct patient counseling and coordination of care  Chief Complaint:   My pulses is racing and my back hurts    History of Present Illness: Aldo Vasques is a 71 y o  female who presents with known history of atrial fibrillation with atrial flutter recently with pre 2 previous admissions presenting from her personal care home where they found that patient was having a high pulse up to 150 patient was pale had a cold chest feeling and pain radiating to her back  Patient came to ED where patient was found to be in atrial flutter on ECG intermittent rates up to the 120 some hypoxia at 89% and a sub therapeutic INR    Patient's daughter at bedside reporting patient was recently seen by her cardiologist who felt the patient's symptoms were due to sleep apnea and patient should be referred to a pulmonologist for sleep study testing and her history of COPD  Daughter further reports patient has been on oxygen in the past however this was taken away as she was on nighttime oxygen after having pneumonia  Patient further continues to have lower extremity edema cardiology again concern about needing patient needing arterial studies, Which is scheduled for June 1st   Patient denies headache dizziness chest pain however patient reports when her her heart rate tends to Good up she gets a cold sensation on her anterior chest and back pain that radiates to her back  Patient does become pale per the daughter  Review of Systems:    Review of Systems   Constitutional: Positive for chills  Negative for fatigue and fever  HENT: Negative  Respiratory: Negative  Negative for shortness of breath  Cardiovascular: Positive for palpitations and leg swelling  Negative for chest pain  Gastrointestinal: Negative  Genitourinary: Negative  Musculoskeletal: Positive for back pain  Neurological: Negative for dizziness, seizures, weakness, light-headedness and headaches  Psychiatric/Behavioral: Negative          Past Medical and Surgical History:     Past Medical History:   Diagnosis Date    [facility]     Brain aneurysm     CAD (coronary artery disease)     Cardiac disease     had st[facility] 12 years ago in [facility]    CHF [facility]congestive heart failure) ([facility] )     CKD (chronic kidney disease)     COPD (chronic obstructive pulmonary disease) (HCC)     Dementia     Dementia     Disease of thyroid gland     Hyperlipidemia     Hyperlipidemia     Hypertension     [facility]     Migraine     Renal disorder     Seizures ([facility] )     Stroke ([facility] )        Past Surgical History:   Procedure Laterality Date    APPENDECTOMY      BLADDER TUMOR EXCISION      BRAIN SURGERY      CARDIAC SURGERY      CORONARY STENT PLACEMENT      5 stents    EYE SURGERY      MANDIBLE FRACTURE SURGERY      TONSILLECTOMY      TUBAL LIGATION      UTERINE FIBROID SURGERY         Meds/Allergies:    Prior to Admission medications    Medication Sig Start Date End Date Taking?  Authorizing Provider   acetaminophen (TYLENOL) 500 mg tablet Take 500 mg by mouth every 6 (six) hours as needed for mild pain   Yes Historical Provider, MD   albuterol (2 5 mg/3 mL) 0 083 % nebulizer solution Take 2 5 mg by nebulization every 6 (six) hours as needed for wheezing   Yes Historical Provider, MD   albuterol (PROVENTIL HFA,VENTOLIN HFA) 90 mcg/act inhaler Inhale 2 puffs every 6 (six) hours as needed for wheezing   Yes Historical Provider, MD   aspirin (ECOTRIN LOW STRENGTH) 81 mg EC tablet Take 81 mg by mouth daily   Yes Historical Provider, MD   atorvastatin (LIPITOR) 10 mg tablet Take 10 mg by mouth daily   Yes Historical Provider, MD   buPROPion (WELLBUTRIN SR) 150 mg 12 hr tablet Take 150 mg by mouth daily   Yes Historical Provider, MD   carBAMazepine (TEGretol) 100 mg chewable tablet Chew 100 mg 3 (three) times a day   Yes Historical Provider, MD   Cholecalciferol (VITAMIN D) 2000 units CAPS Take 1 capsule by mouth daily     Yes Historical Provider, MD   diltiazem (CARDIZEM SR) 120 mg 12 hr capsule Take 1 capsule (120 mg total) by mouth every 12 (twelve) hours 4/18/18  Yes Herman Tsai MD   divalproex sodium (DEPAKOTE ER) 250 mg 24 hr tablet Take 1 tablet (250 mg total) by mouth daily 4/19/18  Yes Herman Tsai MD   fenofibrate micronized (LOFIBRA) 67 MG capsule Take 67 mg by mouth every morning before breakfast   Yes Historical Provider, MD   ferrous sulfate 325 (65 Fe) mg tablet Take 325 mg by mouth 2 (two) times a day   Yes Historical Provider, MD   fluticasone furoate-vilanterol (BREO ELLIPTA) Inhale 1 puff daily   Yes Historical Provider, MD   furosemide (LASIX) 40 mg tablet Take 1 tablet (40 mg total) by mouth 2 (two) times a day 4/18/18  Yes Herman Tsai MD   Levothyroxine Sodium 88 MCG CAPS Take 88 mcg by mouth every morning     Yes Historical Provider, MD   metoprolol tartrate (LOPRESSOR) 50 mg tablet Take 1 tablet (50 mg total) by mouth every 12 (twelve) hours 3/30/18  Yes TANVI Green   potassium chloride (K-DUR,KLOR-CON) 20 mEq tablet Take 1 tablet (20 mEq total) by mouth daily 4/18/18  Yes Shaquille Gasca MD   Sennosides-Docusate Sodium (SENNA PLUS PO) Take by mouth daily as needed   Yes Historical Provider, MD   warfarin (COUMADIN) 2 mg tablet Take 1 tablet (2 mg total) by mouth daily Goal INR 2 0-3 0  Patient taking differently: Take 2 5 mg by mouth daily Goal INR 2 0-3 0  3/30/18  Yes TANVI Green     I have reveiwed home medications using records provided by [facility]  Allergies: Allergies   Allergen Reactions    Ramipril Anaphylaxis    Spiriva Handihaler [Tiotropium Bromide Monohydrate] Swelling    Penicillins        Social History:     Marital Status:    Occupation:    Patient Pre-hospital Living Situation:    Patient Pre-hospital Level of Mobility:     Patient Pre-hospital Diet Restrictions:    Substance Use History:   History   Alcohol Use No     History   Smoking Status    Former Smoker   Smokeless Tobacco    Never Used     History   Drug Use No       Family History:    non-contributory    Physical Exam:     Vitals:   Blood Pressure: 122/72 (05/07/18 1319)  Pulse: 69 (05/07/18 1319)  Temperature: 98 6 °F (37 °C) (05/07/18 1319)  Temp Source: Oral (05/07/18 1319)  Respirations: 20 (05/07/18 1319)  Height: 5' 2" (157 5 cm) (05/07/18 1002)  Weight - Scale: 86 6 kg (190 lb 14 7 oz) (05/07/18 1002)  SpO2: 91 % (05/07/18 1319)    Physical Exam   HENT:   Head: Normocephalic and atraumatic  Eyes: Conjunctivae and EOM are normal    Neck: Normal range of motion  Cardiovascular: Normal heart sounds  An irregular rhythm present  Tachycardia present  Pulmonary/Chest: Effort normal and breath sounds normal    Abdominal: Soft  Bowel sounds are normal    Musculoskeletal: Normal range of motion   She exhibits no edema  Neurological: She is alert  Patient is forgetful at times   Skin: Skin is warm and dry  Psychiatric: She has a normal mood and affect  Her behavior is normal            Additional Data:     Lab Results: I have personally reviewed pertinent reports  Results from last 7 days  Lab Units 05/07/18  1032   WBC Thousand/uL 5 63   HEMOGLOBIN g/dL 11 3*   HEMATOCRIT % 35 2   PLATELETS Thousands/uL 163   NEUTROS PCT % 65   LYMPHS PCT % 18   MONOS PCT % 10   EOS PCT % 6       Results from last 7 days  Lab Units 05/07/18  1032   SODIUM mmol/L 146*   POTASSIUM mmol/L 3 6   CHLORIDE mmol/L 108   CO2 mmol/L 32   BUN mg/dL 32*   CREATININE mg/dL 1 95*   CALCIUM mg/dL 9 2   TOTAL PROTEIN g/dL 7 1   BILIRUBIN TOTAL mg/dL 0 50   ALK PHOS U/L 51   ALT U/L 34   AST U/L 25   GLUCOSE RANDOM mg/dL 187*       Results from last 7 days  Lab Units 05/07/18  1032   INR  1 80*               Imaging: I have personally reviewed pertinent reports  XR chest 2 views   ED Interpretation by Jeanette Diaz MD (05/07 1052)   No acute findings      Final Result by Jayesh Blackmon MD (05/07 1046)      Stable cardiomegaly  Otherwise no active disease in the chest             Workstation performed: [id number]             EKG, Pathology, and Other Studies Reviewed on Admission:   · EKG:  Reviewed noted atrial flutter abnormal ECG    Allscripts / Epic Records Reviewed: Yes     ** Please Note: This note has been constructed using a voice recognition system   **

## 2018-05-07 NOTE — ASSESSMENT & PLAN NOTE
· Does not appear in acute exacerbation  · Patient did have mild hypoxia in the ED this overall improved with oxygen  · Respiratory protocol  · Continue p r n  nebulizers

## 2018-05-07 NOTE — RESPIRATORY THERAPY NOTE
RT Protocol Note  Demario Sickle 71 y o  female MRN: [id number]  Unit/Bed#: -01 Encounter: [id number]    Assessment    Principal Problem:    Atrial fibrillation ([facility] )  Active Problems:    HTN (hypertension)    COPD (chronic obstructive pulmonary disease) (HCC)    Chronic diastolic congestive heart failure (HCC)    Dementia    Seizure disorder (HCC)    Ambulatory dysfunction    Stage 3 chronic kidney disease      Home Pulmonary Medications:  Albuterol mdi and neb prn       Past Medical History:   Diagnosis Date    Atrial fibrillation ([place] [address] )     Brain aneurysm     CAD (coronary artery disease)     Cardiac disease     had stents 12 years ago in [facility]    CHF (congestive heart failure) ([place] [address] )     CKD (chronic kidney disease)     COPD (chronic obstructive pulmonary disease) ([place] [address] )     Dementia     Dementia     Disease of thyroid gland     Hyperlipidemia     Hyperlipidemia     Hypertension     MI (myocardial infarction) ([place] [address] )     Migraine     Renal disorder     Seizures ([place] [address] )     Stroke ([place] [address] )      Social History     Social History    Marital status:      Spouse name: N/A    Number of children: N/A    Years of education: N/A     Social History Main Topics    Smoking status: Former Smoker    Smokeless tobacco: Never Used    Alcohol use No    Drug use: No    Sexual activity: Not Asked     Other Topics Concern    None     Social History Narrative    None       Subjective         Objective    Physical Exam:   Assessment Type: Assess only  General Appearance: Alert  Respiratory Pattern: Normal  Chest Assessment: Chest expansion symmetrical  Bilateral Breath Sounds: Clear    Vitals:  Blood pressure 122/72, pulse (!) 123, temperature 98 6 °F (37 °C), temperature source Oral, resp  rate (!) 24, height 5' 2" (1 575 m), weight 81 6 kg (179 lb 14 3 oz), SpO2 93 %  Imaging and other studies: I have personally reviewed pertinent reports              Plan    Respiratory Plan: Home Bronchodilator Patient pathway, No distress/Pulmonary history        Resp Comments: respiratory protocol completed at this time  Pt uses albuterol mdi prn and neb prn at home  pt stated she does not use oxygen at home: pt is currently on room air with an spo2 of 93%  CXR reviewed, clear per interpretation  pt quit smoking 12 years ago  + pulm hx including COPD  Currently admitted with a-fib  No sob or c/o respiratory distress  lung cta  per protocol pt will be placed on Albuterol neb prn

## 2018-05-07 NOTE — ED PROVIDER NOTES
History  Chief Complaint   Patient presents with    Atrial Fibrillation     Pt [facility] of [facility] for Afib (prior hx); On EMS arrival pt had c/o SOB  On ED arrival, pt has no complaints  HPI  67-year-old female past history AFib on Coumadin brought in by ambulance from assisted living facility for an rapid heart rate as well as shortness of breath  Patient has a past history of COPD as well as atrial flutter/fib  She has had recent admissions for rapid a flutter and has had her medications adjusted  She is on Cardizem follows up with cardiology  Currently patient has no complaints  She is satting 89% on room air she is not maintained on oxygen and heart rate shows rapid a flutter in the 1 teens otherwise hemodynamically stable  Pt has a leg bag for incontinence  Impression and plan 67-year-old female presents with rapid a flutter blood pressure is 966 systolic otherwise patient is in no acute distress mildly hypoxic  Will check cardiac workup as well as a BMP gives gentle fluid bolus check a D-dimer given the shortness of breath and likely admit the patient  Prior to Admission Medications   Prescriptions Last Dose Informant Patient Reported? Taking?    Cholecalciferol (VITAMIN D) 2000 units CAPS 5/7/2018 at Unknown time  Yes Yes   Sig: Take 1 capsule by mouth daily     Levothyroxine Sodium 88 MCG CAPS 5/7/2018 at Unknown time  Yes Yes   Sig: Take 88 mcg by mouth every morning     Sennosides-Docusate Sodium (SENNA PLUS PO) 5/7/2018 at Unknown time  Yes Yes   Sig: Take by mouth daily as needed   acetaminophen (TYLENOL) 500 mg tablet Past Week at Unknown time  Yes Yes   Sig: Take 500 mg by mouth every 6 (six) hours as needed for mild pain   albuterol (2 5 mg/3 mL) 0 083 % nebulizer solution 5/7/2018 at Unknown time  Yes Yes   Sig: Take 2 5 mg by nebulization every 6 (six) hours as needed for wheezing   albuterol (PROVENTIL HFA,VENTOLIN HFA) 90 mcg/act inhaler 5/7/2018 at Unknown time  Yes Yes   Sig: Inhale 2 puffs every 6 (six) hours as needed for wheezing   aspirin (ECOTRIN LOW STRENGTH) 81 mg EC tablet 5/7/2018 at Unknown time  Yes Yes   Sig: Take 81 mg by mouth daily   atorvastatin (LIPITOR) 10 mg tablet 5/7/2018 at Unknown time  Yes Yes   Sig: Take 10 mg by mouth daily   buPROPion (WELLBUTRIN SR) 150 mg 12 hr tablet 5/7/2018 at Unknown time  Yes Yes   Sig: Take 150 mg by mouth daily   carBAMazepine (TEGretol) 100 mg chewable tablet 5/7/2018 at Unknown time  Yes Yes   Sig: Chew 100 mg 3 (three) times a day   diltiazem (CARDIZEM SR) 120 mg 12 hr capsule 5/7/2018 at Unknown time  No Yes   Sig: Take 1 capsule (120 mg total) by mouth every 12 (twelve) hours   divalproex sodium (DEPAKOTE ER) 250 mg 24 hr tablet 5/7/2018 at Unknown time  No Yes   Sig: Take 1 tablet (250 mg total) by mouth daily   fenofibrate micronized (LOFIBRA) 67 MG capsule 5/7/2018 at Unknown time  Yes Yes   Sig: Take 67 mg by mouth every morning before breakfast   ferrous sulfate 325 (65 Fe) mg tablet 5/7/2018 at Unknown time  Yes Yes   Sig: Take 325 mg by mouth 2 (two) times a day   fluticasone furoate-vilanterol (BREO ELLIPTA) 5/7/2018 at Unknown time  Yes Yes   Sig: Inhale 1 puff daily   furosemide (LASIX) 40 mg tablet 5/7/2018 at Unknown time  No Yes   Sig: Take 1 tablet (40 mg total) by mouth 2 (two) times a day   metoprolol tartrate (LOPRESSOR) 50 mg tablet 5/7/2018 at Unknown time  No Yes   Sig: Take 1 tablet (50 mg total) by mouth every 12 (twelve) hours   potassium chloride (K-DUR,KLOR-CON) 20 mEq tablet 5/7/2018 at Unknown time  No Yes   Sig: Take 1 tablet (20 mEq total) by mouth daily   warfarin (COUMADIN) 2 mg tablet 5/6/2018 at Unknown time  No Yes   Sig: Take 1 tablet (2 mg total) by mouth daily Goal INR 2 0-3 0   Patient taking differently: Take 2 5 mg by mouth daily Goal INR 2 0-3 0       Facility-Administered Medications: None       Past Medical History:   Diagnosis Date    Atrial fibrillation [facility])     Brain aneurysm     CAD (coronary artery disease)     Cardiac disease     had stents 12 years ago in [id number] [facility] CHF [facility]congestive [facility] failure) [facility]     CKD (chronic kidney disease)     COPD (chronic obstructive pulmonary disease) ([facility])     Dementia     Dementia     Disease of thyroid gland     Hyperlipidemia     Hyperlipidemia     Hypertension     MI ([facility]ocardial infarction) [facility] [address] [facility]     Migraine     Renal disorder     Seizures ([facility] [address] [facility]     [facility]        Past Surgical History:   Procedure Laterality Date    APPENDECTOMY      BLADDER TUMOR EXCISION      BRAIN SURGERY      CARDIAC SURGERY      CORONARY STENT PLACEMENT      5 stents    EYE SURGERY      MANDIBLE FRACTURE SURGERY      TONSILLECTOMY      TUBAL LIGATION      UTERINE FIBROID SURGERY         Family History   Problem Relation Age of Onset    Heart disease Father      I have reviewed and agree with the history as documented  Social History   Substance Use Topics    Smoking status: Former Smoker    Smokeless tobacco: Never Used    Alcohol use No        Review of Systems   Constitutional: Negative for activity change, appetite change, chills and fever  HENT: Negative for congestion, sore throat, trouble swallowing and voice change  Eyes: Negative for photophobia and redness  Respiratory: Positive for shortness of breath  Negative for cough  Cardiovascular: Positive for palpitations  Negative for chest pain and leg swelling  Gastrointestinal: Negative for abdominal pain, diarrhea, nausea and vomiting  Endocrine: Negative for polyphagia and polyuria  Genitourinary: Negative for dysuria, enuresis, vaginal discharge and vaginal pain  Musculoskeletal: Negative for arthralgias, back pain and gait problem  Skin: Negative for rash  Allergic/Immunologic: Negative      Neurological: Negative for dizziness, tremors, seizures, syncope, facial asymmetry, speech difficulty, weakness, light-headedness, numbness and headaches  Hematological: Negative for adenopathy  Does not bruise/bleed easily  Psychiatric/Behavioral: Negative for agitation  Physical Exam  ED Triage Vitals   Temperature Pulse Respirations Blood Pressure SpO2   05/07/18 1020 05/07/18 1002 05/07/18 1002 05/07/18 1002 05/07/18 1002   98 6 °F (37 °C) (!) 110 20 100/55 (!) 89 %      Temp Source Heart Rate Source Patient Position - Orthostatic VS BP Location FiO2 (%)   05/07/18 1020 05/07/18 1002 05/07/18 1002 05/07/18 1002 --   Oral Monitor Lying Left arm       Pain Score       05/07/18 1200       No Pain           Orthostatic Vital Signs  Vitals:    05/07/18 1319 05/07/18 1500 05/07/18 1515 05/07/18 1850   BP: 122/72 155/72  170/99   Pulse: 69 102 (!) 123    Patient Position - Orthostatic VS: Lying Lying  Lying       Physical Exam   Constitutional: She is oriented to person, place, and time  She appears well-developed and well-nourished  No distress  HENT:   Head: Normocephalic and atraumatic  Right Ear: No hemotympanum  Left Ear: No hemotympanum  Nose: Nose normal  No nasal septal hematoma  Mouth/Throat: Uvula is midline, oropharynx is clear and moist and mucous membranes are normal  She does not have dentures  No oropharyngeal exudate  Eyes: Conjunctivae and EOM are normal  Pupils are equal, round, and reactive to light  Right eye exhibits no discharge  Left eye exhibits no discharge  No scleral icterus  Right eye exhibits no nystagmus  Left eye exhibits no nystagmus  Neck: Trachea normal, normal range of motion, full passive range of motion without pain and phonation normal  Neck supple  No JVD present  No tracheal tenderness present  No tracheal deviation present  No thyromegaly present  No c-spine tenderness   Cardiovascular: Normal heart sounds and intact distal pulses  An irregularly irregular rhythm present  Tachycardia present  Exam reveals no gallop and no friction rub  No murmur heard    Pulmonary/Chest: Effort normal and breath sounds normal  No stridor  No respiratory distress  She has no wheezes  She has no rales  She exhibits no tenderness  Abdominal: Soft  Bowel sounds are normal  She exhibits no distension and no mass  There is no tenderness  There is no rebound and no guarding  No hernia  Musculoskeletal: Normal range of motion  She exhibits no edema, tenderness or deformity  Lymphadenopathy:     She has no cervical adenopathy  Neurological: She is alert and oriented to person, place, and time  She has normal strength  No cranial nerve deficit or sensory deficit  GCS eye subscore is 4  GCS verbal subscore is 5  GCS motor subscore is 6  Reflex Scores:       Patellar reflexes are 2+ on the right side and 2+ on the left side  Achilles reflexes are 2+ on the right side and 2+ on the left side  Skin: Skin is warm and dry  Capillary refill takes less than 2 seconds  No rash noted  She is not diaphoretic  Psychiatric: She has a normal mood and affect  Nursing note and vitals reviewed        ED Medications  Medications   metoprolol (LOPRESSOR) injection 5 mg (5 mg Intravenous Given 5/7/18 1859)   acetaminophen (TYLENOL) tablet 488 mg (not administered)   albuterol inhalation solution 2 5 mg (not administered)   albuterol (PROVENTIL HFA,VENTOLIN HFA) inhaler 2 puff (not administered)   aspirin (ECOTRIN LOW STRENGTH) EC tablet 81 mg (not administered)   atorvastatin (LIPITOR) tablet 10 mg (not administered)   buPROPion (WELLBUTRIN SR) 12 hr tablet 150 mg (not administered)   carBAMazepine (TEGretol) chewable tablet 100 mg (100 mg Oral Given 5/7/18 1530)   diltiazem (CARDIZEM SR) 12 hr capsule 120 mg (not administered)   divalproex sodium (DEPAKOTE ER) 24 hr tablet 250 mg (not administered)   ferrous sulfate tablet 325 mg (325 mg Oral Given 5/7/18 1707)   levothyroxine tablet 88 mcg (not administered)   potassium chloride (K-DUR,KLOR-CON) CR tablet 20 mEq (20 mEq Oral Given 5/7/18 1530)   senna-docusate sodium (SENOKOT S) 8 6-50 mg per tablet 1 tablet (not administered)   warfarin (COUMADIN) tablet 2 5 mg (2 5 mg Oral Given 5/7/18 1707)   heparin (porcine) subcutaneous injection 5,000 Units (5,000 Units Subcutaneous Given 5/7/18 1530)   fluticasone-salmeterol (ADVAIR) 100-50 mcg/dose inhaler 1 puff (not administered)   metoprolol tartrate (LOPRESSOR) tablet 50 mg (50 mg Oral Given 5/7/18 1530)   furosemide (LASIX) tablet 40 mg (not administered)   sodium chloride 0 9 % bolus 500 mL (0 mL Intravenous Stopped 5/7/18 1132)       Diagnostic Studies  Results Reviewed     Procedure Component Value Units Date/Time    B-type natriuretic peptide [[id number]]  (Abnormal) Collected:  05/07/18 1032    Lab Status:  Final result Specimen:  Blood from Arm, Right Updated:  05/07/18 1352     NT-proBNP 3,327 (H) pg/mL     Urine Microscopic [[id number]]  (Abnormal) Collected:  05/07/18 1207    Lab Status:  Final result Specimen:  Urine from Urine, Indwelling Davis Catheter Updated:  05/07/18 1227     RBC, UA 4-10 (A) /hpf      WBC, UA 1-2 (A) /hpf      Epithelial Cells Occasional /hpf      Bacteria, UA None Seen /hpf     UA w Reflex to Microscopic w Reflex to Culture [[id number]]  (Abnormal) Collected:  05/07/18 1207    Lab Status:  Final result Specimen:  Urine from Urine, Indwelling Davis Catheter Updated:  05/07/18 1213     Color, UA Yellow     Clarity, UA Clear     Specific Gravity, UA 1 010     pH, UA 7 0     Leukocytes, UA Trace (A)     Nitrite, UA Positive (A)     Protein, UA Negative mg/dl      Glucose, UA Negative mg/dl      Ketones, UA Negative mg/dl      Urobilinogen, UA 0 2 E U /dl      Bilirubin, UA Negative     Blood, UA Small (A)    Comprehensive metabolic panel [[id number]]  (Abnormal) Collected:  05/07/18 1032    Lab Status:  Final result Specimen:  Blood from Arm, Right Updated:  05/07/18 1106     Sodium 146 (H) mmol/L      Potassium 3 6 mmol/L      Chloride 108 mmol/L      CO2 32 mmol/L      Anion Gap 6 mmol/L      BUN 32 (H) mg/dL      Creatinine 1 95 (H) mg/dL      Glucose 187 (H) mg/dL      Calcium 9 2 mg/dL      AST 25 U/L      ALT 34 U/L      Alkaline Phosphatase 51 U/L      Total Protein 7 1 g/dL      Albumin 3 2 (L) g/dL      Total Bilirubin 0 50 mg/dL      eGFR 25 ml/min/1 73sq m     Narrative:         National Kidney Disease Education Program recommendations are as follows:  GFR calculation is accurate only with a steady state creatinine  Chronic Kidney disease less than 60 ml/min/1 73 sq  meters  Kidney failure less than 15 ml/min/1 73 sq  meters  Troponin I [[id number]]  (Normal) Collected:  05/07/18 1032    Lab Status:  Final result Specimen:  Blood from Arm, Right Updated:  05/07/18 1102     Troponin I <0 02 ng/mL     Narrative:         [employer] Chemistry analyzer 99% cutoff is > 0 04 ng/mL in network labs    o cTnI 99% cutoff is useful only when applied to patients in the clinical setting of myocardial ischemia  o cTnI 99% cutoff should be interpreted in the context of clinical history, ECG findings and possibly cardiac imaging to establish correct diagnosis  o cTnI 99% cutoff may be suggestive but clearly not indicative of a coronary event without the clinical setting of myocardial ischemia      D-Dimer [[id number]]  (Abnormal) Collected:  05/07/18 1032    Lab Status:  Final result Specimen:  Blood from Arm, Right Updated:  05/07/18 1100     D-Dimer, Quant 477 (H) ng/ml (FEU)     APTT [[id number]]  (Abnormal) Collected:  05/07/18 1032    Lab Status:  Final result Specimen:  Blood from Arm, Right Updated:  05/07/18 1052     PTT 37 (H) seconds     Protime-INR [[id number]]  (Abnormal) Collected:  05/07/18 1032    Lab Status:  Final result Specimen:  Blood from Arm, Right Updated:  05/07/18 1052     Protime 21 4 (H) seconds      INR 1 80 (H)    CBC and differential [[id number]]  (Abnormal) Collected:  05/07/18 1032    Lab Status:  Final result Specimen:  Blood from Arm, Right Updated:  05/07/18 1040     WBC 5 63 Thousand/uL RBC 3 36 (L) Million/uL      Hemoglobin 11 3 (L) g/dL      Hematocrit 35 2 %       (H) fL      MCH 33 6 pg      MCHC 32 1 g/dL      RDW 12 9 %      MPV 11 9 fL      Platelets 172 Thousands/uL      nRBC 0 /100 WBCs      Neutrophils Relative 65 %      Lymphocytes Relative 18 %      Monocytes Relative 10 %      Eosinophils Relative 6 %      Basophils Relative 1 %      Neutrophils Absolute 3 65 Thousands/µL      Lymphocytes Absolute 1 03 Thousands/µL      Monocytes Absolute 0 56 Thousand/µL      Eosinophils Absolute 0 33 Thousand/µL      Basophils Absolute 0 03 Thousands/µL                  XR chest 2 views   ED Interpretation by Palma Woo MD (05/07 1052)   No acute findings      Final Result by Bing Peoples MD (05/07 1046)      Stable cardiomegaly    Otherwise no active disease in the chest             Workstation performed: [id number]                    Procedures  Procedures       Phone Contacts  ED Phone Contact    ED Course  ED Course as of May 07 1909   Mon May 07, 2018   1105 D-DIMER QUANTITATIVE: (!) 80   1113 Age adjusted d-dimer wnl                                MDM  CritCare Time    Disposition  Final diagnoses:   Atrial flutter with rapid ventricular response ([facility] [address] )   T[facility]acute [facility] injury) ([facility] )     Time reflects when diagnosis was documented in both MDM as applicable and the Disposition within this note     Time User Action Codes Description Comment    5/7/2018 11:54 AM Arminda Koshkonong T Add [I48 92] Atrial flutter with rapid ventricular response ([facility] [address] )     5/7/2018 11:54 AM Arminda Koshkonong T Add [N17 9] TAM (acute kidney injury) ([facility] [address] )     5/7/2018  2:00 PM Lamar Clayton Add [I48 91] Atrial fibrillation ([facility] )     5/7/2018  2:14 PM Jerrica Cables L Add [I10] Essential hypertension     5/7/2018  2:14 PM [facility]  Essential hypertension     5/7/2018  2:14 PM Coty [facility]  Essential hypertension     5/7/2018  2:14 PM Jerrica Cables L Add [J44 9] Chronic obstructive pulmonary disease, unspecified COPD type ([facility] )     5/7/2018  2:14 PM Philippalvino Lucas RIKY Modify [J44 9] Chronic obstructive pulmonary disease, unspecified COPD type ([place] [address] )     5/7/2018  2:14 PM Barbara Fonseca Modify [J44 9] Chronic obstructive pulmonary disease, unspecified COPD type [facility])       ED Disposition     ED Disposition Condition Comment    Admit  Case was discussed with HANANE and the patient's admission status was agreed to be Admission Status: inpatient status to the service of Dr Virgil Duffy MD Documentation      Most Recent Value   Accepting Facility Name, [place] [address]      RN Documentation      [facility] [address] [facility] [address] Name, [place] [address]      Follow-up Information     Follow up With Specialties Details Why Contact [facility]  Follow up NURSE, PLEASE FAX D/C SUMMARY  [id number] [phone]        Current Discharge Medication List      CONTINUE these medications which have NOT CHANGED    Details   acetaminophen (TYLENOL) 500 mg tablet Take 500 mg by mouth every 6 (six) hours as needed for mild pain      albuterol (2 5 mg/3 mL) 0 083 % nebulizer solution Take 2 5 mg by nebulization every 6 (six) hours as needed for wheezing      albuterol (PROVENTIL HFA,VENTOLIN HFA) 90 mcg/act inhaler Inhale 2 puffs every 6 (six) hours as needed for wheezing      aspirin (ECOTRIN LOW STRENGTH) 81 mg EC tablet Take 81 mg by mouth daily      atorvastatin (LIPITOR) 10 mg tablet Take 10 mg by mouth daily      buPROPion (WELLBUTRIN SR) 150 mg 12 hr tablet Take 150 mg by mouth daily      carBAMazepine (TEGretol) 100 mg chewable tablet Chew 100 mg 3 (three) times a day      Cholecalciferol (VITAMIN D) 2000 units CAPS Take 1 capsule by mouth daily        diltiazem (CARDIZEM SR) 120 mg 12 hr capsule Take 1 capsule (120 mg total) by mouth every 12 (twelve) hours  Qty: 30 capsule, Refills: 0    Associated Diagnoses: Atrial flutter ([facility] )      divalproex sodium (DEPAKOTE ER) 250 mg 24 hr tablet Take 1 tablet (250 mg total) by mouth daily  Qty: 30 tablet, Refills: 0    Associated Diagnoses: Mood disorder as late effect of cerebrovascular accident (CVA)      fenofibrate micronized (LOFIBRA) 67 MG capsule Take 67 mg by mouth every morning before breakfast      ferrous sulfate 325 (65 Fe) mg tablet Take 325 mg by mouth 2 (two) times a day      fluticasone furoate-vilanterol (BREO ELLIPTA) Inhale 1 puff daily      furosemide (LASIX) 40 mg tablet Take 1 tablet (40 mg total) by mouth 2 (two) times a day  Qty: 30 tablet, Refills: 0    Associated Diagnoses: Acute on chronic diastolic congestive heart failure ([facility] ); Bilateral leg edema      Levothyroxine Sodium 88 MCG CAPS Take 88 mcg by mouth every morning        metoprolol tartrate (LOPRESSOR) 50 mg tablet Take 1 tablet (50 mg total) by mouth every 12 (twelve) hours  Refills: 0    Associated Diagnoses: New onset atrial flutter (HCC)      potassium chloride (K-DUR,KLOR-CON) 20 mEq tablet Take 1 tablet (20 mEq total) by mouth daily  Qty: 15 tablet, Refills: 0    Associated Diagnoses: Acute on chronic diastolic congestive heart failure (HCC)      Sennosides-Docusate Sodium (SENNA PLUS PO) Take by mouth daily as needed      warfarin (COUMADIN) 2 mg tablet Take 1 tablet (2 mg total) by mouth daily Goal INR 2 0-3 0  Refills: 0    Associated Diagnoses: New onset atrial flutter (HCC)           No discharge procedures on file      ED Provider  Electronically Signed by           Ruben Phelps MD  05/07/18 6670

## 2018-05-07 NOTE — ASSESSMENT & PLAN NOTE
· Known history with recent diagnosis patient is currently showing on ECG and telemetry atrial flutter with RVR   · Patient presented with some intermittent RVR in the ED  · Continue home dose of Cardizem and metoprolol  · Will add p r n  metoprolol 5 mg for sustained rate greater than 120  · Patient is on Coumadin her INR is subtherapeutic at 1 8 will start subq heparin to bridge until her INR becomes therapeutic  · Will continue home dose of Coumadin repeat INR in a m  · Telemetry monitoring  · Cardiology consult  · Patient is known to Dr Juana Carlson there is concern sleep apnea could be contributing to patient's arrhythmia    Will do continuous pulse ox at bedtime tonight apply oxygen as needed and will consult pulmonology given this is the patient's 3rd admission for AFib/atrial flutter

## 2018-05-07 NOTE — SOCIAL WORK
CM met with pt and daughter Veronique Braga at bedside  Pt resides at [facility] and is assisted with ADL's by the staff  She uses a walker to ambulate  She is current with [facility]  and has been in [facility] for rehab in the past   Her cardiologist is Dr Ramez Moon and her PCP is the house doctor at [facility]  [facility] uses [employer] and there is no issues with the co-pays  Pt has an Advanced Directive and her POA is her daughter Veronique Braga  Denies substance abuse, tobacco abuse, or mental health issues  She does have dementia  Daughter transports to all appointments and will transport back to [place] on d/c  CM will also place referral to [facility] to resume services when pt is medically cleared  CM department will continue to follow through hospitalization  CM reviewed discharge planning process including the following: identifying help at home, patient preference for discharge planning needs, pharmacy preference, and availability of treatment team to discuss questions or concerns patient and/or family may have regarding understanding medications and recognizing signs and symptoms once discharged  CM also encouraged patient to follow up with all recommended appointments after discharge  Patient advised of importance for patient and family to participate in managing patients medical well being  CM name and role reviewed and Discharge Checklist provided  Encouraged patient and caregiver to review prior to discharge

## 2018-05-07 NOTE — ASSESSMENT & PLAN NOTE
· BNP BMP is greater than 3000 however this is less than 1 was in April patient is not showing clinical signs of volume overload  · Chest x-ray is not showing evidence of vascular congestion  · Will continue home dose of Lasix and potassium

## 2018-05-07 NOTE — CONSULTS
Consultation - Cardiology  Kylah Bullard 71 y o  female MRN: [id number]  Unit/Bed#: -01 Encounter: [id number]    Inpatient consult to Cardiology  Consult performed by: Deepa Beasley ordered by: Lukasz Valerio          Physician Requesting Consult: Maryjaen Plaza MD  Reason for Consult / Principal Problem: A Flutter with RVR  HPI: Cardiologist Dr Bonifacio Brennan is a 71y o  year old female who has a history of A Flutter on coumadin, CAD s/p stent, CKD 3, Chronic diastolic CHF, COPD, HTN, HLD, untreated JESENIA presents for A Fib with RVR  History taken per daughter as patient is forgetful  Had back pain, pallor, headache, and rapid pulse earlier  Has had multiple hospitalizations for A Fib/Flutter with RVR  Was previously seen for pneumonia with nighttime hypoxia and was placed on O2, but was taken off O2 as it was deemed unnecessary  Was seen by cardiologist Dr Judy Fernandez on 5/4/18 and was feeling fine at that time  REVIEW OF SYSTEMS:  Constitutional:  +pallor  Denies fever or chills   Eyes:  Denies change in visual acuity   HENT:  Denies nasal congestion or sore throat   Respiratory:  Denies cough or shortness of breath   Cardiovascular:  Denies chest pain or edema   GI:  Denies abdominal pain, nausea, vomiting, bloody stools or diarrhea   :  Denies dysuria, frequency, difficulty in micturition and nocturia  Musculoskeletal:  +back pain  Denies joint pain   Neurologic:  +headache   Denies  focal weakness or sensory changes   Endocrine:  Denies polyuria or polydipsia   Lymphatic:  Denies swollen glands   Psychiatric:  Denies depression or anxiety     Historical Information   Past Medical History:   Diagnosis Date    [facility]     Brain aneurysm     CAD (coronary artery disease)     Cardiac disease     [facility] 12 years ago in [facility]    CHF (congestive heart failure) [facility]     CKD (chronic kidney disease)     [facility]     Dementia     Dementia     Disease of thyroid gland     Hyperlipidemia     Hyperlipidemia     Hypertension     MI [facility]ocardial infarction) ([facility] )     Migraine     Renal disorder     Seizures ([facility] )     Stroke ([place] [address] )      Past Surgical History:   Procedure Laterality Date    APPENDECTOMY      BLADDER TUMOR EXCISION      BRAIN SURGERY      CARDIAC SURGERY      CORONARY STENT PLACEMENT      5 stents    EYE SURGERY      MANDIBLE FRACTURE SURGERY      TONSILLECTOMY      TUBAL LIGATION      UTERINE FIBROID SURGERY       History   Alcohol Use No     History   Drug Use No     History   Smoking Status    Former Smoker   Smokeless Tobacco    Never Used     Family History:   Family History   Problem Relation Age of Onset    Heart disease Father        MEDS & ALLERGIES:  all current active meds have been reviewed and current meds:   Current Facility-Administered Medications   Medication Dose Route Frequency    acetaminophen (TYLENOL) tablet 488 mg  488 mg Oral Q6H PRN    albuterol (PROVENTIL HFA,VENTOLIN HFA) inhaler 2 puff  2 puff Inhalation Q6H PRN    albuterol inhalation solution 2 5 mg  2 5 mg Nebulization Q6H PRN    [START ON 5/8/2018] aspirin (ECOTRIN LOW STRENGTH) EC tablet 81 mg  81 mg Oral Daily    [START ON 5/8/2018] atorvastatin (LIPITOR) tablet 10 mg  10 mg Oral Daily    [START ON 5/8/2018] buPROPion (WELLBUTRIN SR) 12 hr tablet 150 mg  150 mg Oral Daily    carBAMazepine (TEGretol) chewable tablet 100 mg  100 mg Oral TID    diltiazem (CARDIZEM SR) 12 hr capsule 120 mg  120 mg Oral Q12H [facility]    [START ON 5/8/2018] divalproex sodium (DEPAKOTE ER) 24 hr tablet 250 mg  250 mg Oral Daily    ferrous sulfate tablet 325 mg  325 mg Oral BID    fluticasone-salmeterol (ADVAIR) 100-50 mcg/dose inhaler 1 puff  1 puff Inhalation Q12H [facility]    [START ON 5/8/2018] furosemide (LASIX) tablet 40 mg  40 mg Oral Daily    heparin (porcine) subcutaneous injection 5,000 Units  5,000 Units Subcutaneous Q8H [address]    [START ON 5/8/2018] levothyroxine tablet 88 mcg  88 mcg Oral Early Morning    metoprolol (LOPRESSOR) injection 5 mg  5 mg Intravenous Q6H PRN    metoprolol tartrate (LOPRESSOR) tablet 50 mg  50 mg Oral Q6H    potassium chloride (K-DUR,KLOR-CON) CR tablet 20 mEq  20 mEq Oral Daily    senna-docusate sodium (SENOKOT S) 8 6-50 mg per tablet 1 tablet  1 tablet Oral HS    warfarin (COUMADIN) tablet 2 5 mg  2 5 mg Oral Daily (warfarin)        Allergies   Allergen Reactions    Ramipril Anaphylaxis    Spiriva Handihaler [Tiotropium Bromide Monohydrate] Swelling    Penicillins        OBJECTIVE:  Vitals:   Vitals:    05/07/18 1319   BP: 122/72   Pulse: 69   Resp: 20   Temp: 98 6 °F (37 °C)   SpO2: 91%     Body mass index is 32 9 kg/m²  Systolic (68LXC), HGT:299 , Min:100 , ZEC:987     Diastolic (51GKC), KDB:31, Min:54, Max:72      Intake/Output Summary (Last 24 hours) at 05/07/18 1503  Last data filed at 05/07/18 1132   Gross per 24 hour   Intake              500 ml   Output                0 ml   Net              500 ml     Weight (last 2 days)     Date/Time   Weight    05/07/18 1418  81 6 (179 9)    05/07/18 1002  86 6 (190 92)            Invasive Devices     Peripheral Intravenous Line            Peripheral IV 05/07/18 Right Antecubital less than 1 day          Drain            Urethral Catheter Latex 16 Fr  23 days                PHYSICAL EXAMS:  General:  Patient is not in acute distress, laying in the bed comfortably, awake, alert responding to commands  Head: Normocephalic, Atraumatic  HEENT:  Both pupils normal-size atraumatic, normocephalic, nonicteric  Neck:  JVP not raised  Trachea central  Respiratory:  Bronchovascular breathing all over the chest without any accompaniment  Cardiovascular:  +irregular  +tachycardic  S1-S2 normal without any murmur rails or rub  GI:  Abdomen soft nontender   Liver and spleen normal size  Musculoskeletal:  No edema  Integument:  No skin rashes or ulceration  Lymphatic:  No cervical or inguinal lymphadenopathy  Neurologic:  Patient is awake alert, responding to command, well-oriented to time and place and person    LABORATORY RESULTS:    Results from last 7 days  Lab Units 18  1032   TROPONIN I ng/mL <0 02     CBC with diff:   Results from last 7 days  Lab Units 18  1032   WBC Thousand/uL 5 63   HEMOGLOBIN g/dL 11 3*   HEMATOCRIT % 35 2   MCV fL 105*   PLATELETS Thousands/uL 163   MCH pg 33 6   MCHC g/dL 32 1   RDW % 12 9   MPV fL 11 9   NRBC AUTO /100 WBCs 0       CMP:  Results from last 7 days  Lab Units 18  1032   SODIUM mmol/L 146*   POTASSIUM mmol/L 3 6   CHLORIDE mmol/L 108   CO2 mmol/L 32   ANION GAP mmol/L 6   BUN mg/dL 32*   CREATININE mg/dL 1 95*   GLUCOSE RANDOM mg/dL 187*   CALCIUM mg/dL 9 2   AST U/L 25   ALT U/L 34   ALK PHOS U/L 51   TOTAL PROTEIN g/dL 7 1   BILIRUBIN TOTAL mg/dL 0 50   EGFR ml/min/1 73sq m 25       BMP:  Results from last 7 days  Lab Units 18  1032   SODIUM mmol/L 146*   POTASSIUM mmol/L 3 6   CHLORIDE mmol/L 108   CO2 mmol/L 32   BUN mg/dL 32*   CREATININE mg/dL 1 95*   GLUCOSE RANDOM mg/dL 187*   CALCIUM mg/dL 9 2         Results from last 7 days  Lab Units 18  1032   NT-PRO BNP pg/mL 3,327*                    Results from last 7 days  Lab Units 18  1032   INR  1 80*       Lipid Profile:   No results found for: CHOL  No results found for: HDL  No results found for: LDLCALC  No results found for: TRIG    Cardiac testing:   Results for orders placed during the hospital encounter of 12/10/17   Echo complete with contrast if indicated    Narrative [address] [place] [address] [place] [address]  [phone]    Transthoracic Echocardiogram  2D, M-mode, Doppler, and Color Doppler    Study date:  11-Dec-2017    Patient: Pennie Puri  MR number: [id number]  Account number: [de-identified]  : 1945  Age: 71 years  Gender: Female  Status: Inpatient  Location: Bedside  Height: 60 in  Weight: 192 lb  BP: 99/ 50 mmHg    Indications: Shortness of breath  Diagnoses: R06 00 - Dyspnea, unspecified, R06 02 - Shortness of breath    Sonographer:  Eileen Polo  Interpreting Physician:  Boni Howell MD  Referring Physician:  Evi Garay PA-C  Group:  [facility]    SUMMARY    LEFT VENTRICLE:  Systolic function was normal  Ejection fraction was estimated in the range of 55 % to 65 %  There were no regional wall motion abnormalities  There was mild concentric hypertrophy  Doppler parameters were consistent with abnormal left ventricular relaxation (grade 1 diastolic dysfunction)  MITRAL VALVE:  There was mild annular calcification  There was mild regurgitation  AORTIC VALVE:  There was mild regurgitation  TRICUSPID VALVE:  There was mild regurgitation  PULMONIC VALVE:  There was mild regurgitation  HISTORY: PRIOR HISTORY: Elevated trponin, Dementia, Seizures, Stroke, Myocardial infarction  Congestive heart failure  Risk factors: hypertension and hypercholesterolemia  Chronic lung disease  PROCEDURE: The procedure was performed at the bedside  This was a routine study  The transthoracic approach was used  The study included complete 2D imaging, M-mode, complete spectral Doppler, and color Doppler  The heart rate was 68 bpm,  at the start of the study  Images were obtained from the parasternal, apical, subcostal, and suprasternal notch acoustic windows  Echocardiographic views were limited due to poor acoustic window availability, decreased penetration, and  lung interference  This was a technically difficult study  LEFT VENTRICLE: Size was normal  Systolic function was normal  Ejection fraction was estimated in the range of 55 % to 65 %  There were no regional wall motion abnormalities  Wall thickness was normal  There was mild concentric  hypertrophy   DOPPLER: Doppler parameters were consistent with abnormal left ventricular relaxation (grade 1 diastolic dysfunction)  RIGHT VENTRICLE: The size was normal  Systolic function was normal  Wall thickness was normal     LEFT ATRIUM: Size was normal     RIGHT ATRIUM: Size was normal     MITRAL VALVE: There was mild annular calcification  Valve structure was normal  There was normal leaflet separation  DOPPLER: The transmitral velocity was within the normal range  There was no evidence for stenosis  There was mild  regurgitation  AORTIC VALVE: The valve was trileaflet  Leaflets exhibited normal thickness and normal cuspal separation  DOPPLER: Transaortic velocity was within the normal range  There was no evidence for stenosis  There was mild regurgitation  TRICUSPID VALVE: The valve structure was normal  There was normal leaflet separation  DOPPLER: The transtricuspid velocity was within the normal range  There was no evidence for stenosis  There was mild regurgitation  PULMONIC VALVE: Leaflets exhibited normal thickness, no calcification, and normal cuspal separation  DOPPLER: The transpulmonic velocity was within the normal range  There was mild regurgitation  PERICARDIUM: There was no pericardial effusion  The pericardium was normal in appearance  AORTA: The root exhibited normal size      SYSTEM MEASUREMENT TABLES    2D  %FS: 29 2 %  Ao Diam: 3 1 cm  EDV(Teich): 140 8 ml  EF(Teich): 55 6 %  ESV(Teich): 62 5 ml  IVSd: 1 1 cm  LA Area: 20 9 cm2  LA Diam: 3 7 cm  LVEDV MOD A4C: 117 1 ml  LVEF MOD A4C: 50 3 %  LVESV MOD A4C: 58 2 ml  LVIDd: 5 4 cm  LVIDs: 3 8 cm  LVLd A4C: 8 5 cm  LVLs A4C: 7 1 cm  LVPWd: 1 1 cm  RA Area: 17 5 cm2  RVIDd: 3 2 cm  SV MOD A4C: 59 ml  SV(Teich): 78 3 ml    CW  AR Dec Pike: 1 9 m/s2  AR Dec Time: 2208 1 ms  AR PHT: 640 4 ms  AR Vmax: 4 1 m/s  AR maxP 4 mmHg  TR Vmax: 2 5 m/s  TR maxP mmHg    MM  TAPSE: 1 4 cm    PW  AVC: 389 7 ms  E': 0 1 m/s  E/E': 15 3  MV A Anoop: 1 m/s  MV Dec Pike: 3 4 m/s2  MV DecT: 240 3 ms  MV E Anoop: 0 8 m/s  MV E/A Ratio: 0 8  MV PHT: 69 7 ms  MVA By PHT: 3 2 cm2    [facility]redited [facility]    Prepared and electronically signed by    Wallace Johnston MD  Signed 11-Dec-2017 12:09:00           Imaging: I have personally reviewed pertinent reports  EKG reviewed personally:   Unavailable to me at this time    Telemetry reviewed personally:   A Flutter with RVR, HR 120s    Assessment/Plan:  1  Atrial flutter with RVR:  Will increase Lopressor to 50 mg Q 6  Continue Cardizem  mg b i d   Continue Coumadin for anticoagulation  2   Chronic diastolic Congestive heart failure:  -BNP 3327  CXR shows stable cardiomegaly  -ECHO from December 2017 shows EF 55-65%, no regional wall motion abnormalities, grade 1 diastolic dysfunction  -currently euvolemic, Not currently in exacerbation   -creatinine 1 95  Will decrease Lasix to daily   -continue Lopressor   -low-salt diet, daily weights, I/O     3   CAD S/P PCI:  No anginal symptoms at this time  Continue aspirin, statin, Lopressor  4   Hypertension:  Stable, continue present regimen  Will increase Lopressor as above given rapid heart rate  5   Hyperlipidemia:  Continue statin  Code Status: Level 3 - DNAR and DNI    Thank you for allowing us to participate in this patient's care  Counseling / Coordination of Care  Total floor / unit time spent today 35 minutes  Greater than 50% of total time was spent with the patient and / or family counseling and / or coordination of care  A description of the counseling / coordination of care: Review of history, current assessment, development of a plan      Kana Swift PA-C  5/7/2018,3:03 PM    Portions of the record may have been created with voice recognition software   Occasional wrong word or "sound a like" substitutions may have occurred due to the inherent limitations of voice recognition software   Read the chart carefully and recognize, using context, where substitutions have occurred

## 2018-05-08 LAB
ANION GAP SERPL CALCULATED.3IONS-SCNC: 8 MMOL/L (ref 4–13)
BUN SERPL-MCNC: 34 MG/DL (ref 5–25)
CALCIUM SERPL-MCNC: 9.3 MG/DL (ref 8.3–10.1)
CHLORIDE SERPL-SCNC: 107 MMOL/L (ref 100–108)
CO2 SERPL-SCNC: 29 MMOL/L (ref 21–32)
CREAT SERPL-MCNC: 1.86 MG/DL (ref 0.6–1.3)
ERYTHROCYTE [DISTWIDTH] IN BLOOD BY AUTOMATED COUNT: 13.1 % (ref 11.6–15.1)
GFR SERPL CREATININE-BSD FRML MDRD: 27 ML/MIN/1.73SQ M
GLUCOSE SERPL-MCNC: 111 MG/DL (ref 65–140)
HCT VFR BLD AUTO: 35.4 % (ref 34.8–46.1)
HGB BLD-MCNC: 11.4 G/DL (ref 11.5–15.4)
INR PPP: 1.82 (ref 0.86–1.16)
MCH RBC QN AUTO: 33.3 PG (ref 26.8–34.3)
MCHC RBC AUTO-ENTMCNC: 32.2 G/DL (ref 31.4–37.4)
MCV RBC AUTO: 104 FL (ref 82–98)
PLATELET # BLD AUTO: 153 THOUSANDS/UL (ref 149–390)
PMV BLD AUTO: 11.8 FL (ref 8.9–12.7)
POTASSIUM SERPL-SCNC: 4.2 MMOL/L (ref 3.5–5.3)
PROTHROMBIN TIME: 21.5 SECONDS (ref 12.1–14.4)
RBC # BLD AUTO: 3.42 MILLION/UL (ref 3.81–5.12)
SODIUM SERPL-SCNC: 144 MMOL/L (ref 136–145)
WBC # BLD AUTO: 6.49 THOUSAND/UL (ref 4.31–10.16)

## 2018-05-08 PROCEDURE — 85027 COMPLETE CBC AUTOMATED: CPT | Performed by: NURSE PRACTITIONER

## 2018-05-08 PROCEDURE — 99232 SBSQ HOSP IP/OBS MODERATE 35: CPT | Performed by: INTERNAL MEDICINE

## 2018-05-08 PROCEDURE — 94760 N-INVAS EAR/PLS OXIMETRY 1: CPT

## 2018-05-08 PROCEDURE — 94660 CPAP INITIATION&MGMT: CPT

## 2018-05-08 PROCEDURE — 85610 PROTHROMBIN TIME: CPT | Performed by: NURSE PRACTITIONER

## 2018-05-08 PROCEDURE — 80048 BASIC METABOLIC PNL TOTAL CA: CPT | Performed by: NURSE PRACTITIONER

## 2018-05-08 PROCEDURE — 94762 N-INVAS EAR/PLS OXIMTRY CONT: CPT

## 2018-05-08 RX ORDER — FUROSEMIDE 20 MG/1
20 TABLET ORAL ONCE
Status: COMPLETED | OUTPATIENT
Start: 2018-05-08 | End: 2018-05-08

## 2018-05-08 RX ADMIN — FLUTICASONE PROPIONATE AND SALMETEROL 1 PUFF: 50; 100 POWDER RESPIRATORY (INHALATION) at 22:03

## 2018-05-08 RX ADMIN — CARBAMAZEPINE 100 MG: 100 TABLET, CHEWABLE ORAL at 16:35

## 2018-05-08 RX ADMIN — ASPIRIN 81 MG: 81 TABLET, COATED ORAL at 08:42

## 2018-05-08 RX ADMIN — METOPROLOL TARTRATE 50 MG: 50 TABLET ORAL at 08:47

## 2018-05-08 RX ADMIN — CARBAMAZEPINE 100 MG: 100 TABLET, CHEWABLE ORAL at 08:42

## 2018-05-08 RX ADMIN — CARBAMAZEPINE 100 MG: 100 TABLET, CHEWABLE ORAL at 22:00

## 2018-05-08 RX ADMIN — FLUTICASONE PROPIONATE AND SALMETEROL 1 PUFF: 50; 100 POWDER RESPIRATORY (INHALATION) at 08:48

## 2018-05-08 RX ADMIN — FUROSEMIDE 20 MG: 20 TABLET ORAL at 11:30

## 2018-05-08 RX ADMIN — DIVALPROEX SODIUM 250 MG: 250 TABLET, FILM COATED, EXTENDED RELEASE ORAL at 08:43

## 2018-05-08 RX ADMIN — HEPARIN SODIUM 5000 UNITS: 5000 INJECTION, SOLUTION INTRAVENOUS; SUBCUTANEOUS at 22:00

## 2018-05-08 RX ADMIN — DILTIAZEM HYDROCHLORIDE 120 MG: 60 CAPSULE, EXTENDED RELEASE ORAL at 22:03

## 2018-05-08 RX ADMIN — Medication 325 MG: at 08:43

## 2018-05-08 RX ADMIN — Medication 325 MG: at 17:24

## 2018-05-08 RX ADMIN — WARFARIN SODIUM 2.5 MG: 2.5 TABLET ORAL at 17:24

## 2018-05-08 RX ADMIN — LEVOTHYROXINE SODIUM 88 MCG: 88 TABLET ORAL at 05:35

## 2018-05-08 RX ADMIN — BUPROPION HYDROCHLORIDE 150 MG: 150 TABLET, FILM COATED, EXTENDED RELEASE ORAL at 08:43

## 2018-05-08 RX ADMIN — METOPROLOL TARTRATE 50 MG: 50 TABLET ORAL at 16:35

## 2018-05-08 RX ADMIN — POTASSIUM CHLORIDE 20 MEQ: 1500 TABLET, EXTENDED RELEASE ORAL at 08:42

## 2018-05-08 RX ADMIN — HEPARIN SODIUM 5000 UNITS: 5000 INJECTION, SOLUTION INTRAVENOUS; SUBCUTANEOUS at 05:35

## 2018-05-08 RX ADMIN — ATORVASTATIN CALCIUM 10 MG: 10 TABLET, FILM COATED ORAL at 08:42

## 2018-05-08 RX ADMIN — HEPARIN SODIUM 5000 UNITS: 5000 INJECTION, SOLUTION INTRAVENOUS; SUBCUTANEOUS at 13:24

## 2018-05-08 RX ADMIN — FUROSEMIDE 40 MG: 40 TABLET ORAL at 08:42

## 2018-05-08 RX ADMIN — METOPROLOL TARTRATE 50 MG: 50 TABLET ORAL at 22:00

## 2018-05-08 RX ADMIN — STANDARDIZED SENNA CONCENTRATE AND DOCUSATE SODIUM 1 TABLET: 8.6; 5 TABLET, FILM COATED ORAL at 22:00

## 2018-05-08 NOTE — PROGRESS NOTES
Pt OOB o chair x 1 assist and tolerated well  O2 sat 92 % on 1 L NC  Pt sat 90- 91 %   No SOB  Pt tolerating well  No acute distress noted  Will continue to monitor

## 2018-05-08 NOTE — RAPID RESPONSE
Progress Note - Rapid Response   Olamide Almazan 71 y o  female MRN: [id number]    Time Called ( Time): 1944  Date Called: 05/07/2018  Level of Care: MS  Room#: 052  [id number] Time ( Time): 1945  Event End Time ( Time): 2006  MEWS score at time of Rapid Response:   Primary reason for call: Acute change in SPO2  Interventions:  Airway/Breathing:  NPPV and ABG  Circulation: EKG  Other Treatments: N/A       Assessment:   1  Acute Pulmonary Edema    Plan:   · Tx during RRT: Lasix 40 mg ivp, NTG 0 4 mg sl, morphine 2 mg ivp, ECG, ABG, BIPAP  · Continue on BIPAP for now  · Continue to diurese until s/s clear and net output is negative  · Maintain SaO2 >89%       HPI/Chief Complaint (Background/Situation): Olamide Almazan is a 71y o  year old female with PMH of Atrial fib/flutter, COPD, chronic heart failure, CKD, HTN, and seizure disorder who was admitted to Med/Surg 2 with Atrial fib/flutter  An RRT was called for her this evening due to low SaO2 of 74% on Os 2 l/m nc  Patient received in room 209, sitting on edge of bed, initial SBP in 190's, SaO2 86% on O2 6 l/m, , HRIR, RR ~30, lung sounds course rales b/l, no peripheral edema present  Treatment listed as above  SLIM attending Dr Angelina Bowers present      Historical Information   Past Medical History:   Diagnosis Date    [facility]     Brain aneurysm     CAD (coronary artery disease)     Cardiac disease     had stents 12 years ago in [address] [place] [address] CHF (congestive heart failure) (HCC)     CKD (chronic kidney disease)     COPD (chronic obstructive pulmonary disease) (HCC)     Dementia     Dementia     Disease of thyroid gland     Hyperlipidemia     Hyperlipidemia     Hypertension     [facility]     Migraine     Renal disorder     Seizures [facility]     Stroke [facility])      Past Surgical History:   Procedure Laterality Date    APPENDECTOMY      BLADDER TUMOR EXCISION      BRAIN SURGERY  CARDIAC SURGERY      CORONARY STENT PLACEMENT      5 stents    EYE SURGERY      MANDIBLE FRACTURE SURGERY      TONSILLECTOMY      TUBAL LIGATION      UTERINE FIBROID SURGERY       Social History   History   Alcohol Use No     History   Drug Use No     History   Smoking Status    Former Smoker   Smokeless Tobacco    Never Used     Family History: non-contributory    Meds/Allergies     Current Facility-Administered Medications:  acetaminophen 488 mg Oral Q6H PRN TANVI Nuno   albuterol 2 puff Inhalation Q6H PRN TANVI Nuno   albuterol 2 5 mg Nebulization Q6H PRN TANVI Nuno   [START ON 5/8/2018] aspirin 81 mg Oral Daily TANVI Nuno   [START ON 5/8/2018] atorvastatin 10 mg Oral Daily TANVI Nuno   [START ON 5/8/2018] buPROPion 150 mg Oral Daily TANVI Nuno   carBAMazepine 100 mg Oral TID TANVI Nuno   diltiazem 120 mg Oral Q12H [facility] TANVI Nuno   [START ON 5/8/2018] divalproex sodium 250 mg Oral Daily TANVI Nuno   ferrous sulfate 325 mg Oral BID TANVI Nuno   fluticasone-salmeterol 1 puff Inhalation Q12H [facility] TANVI Nuno   [START ON 5/8/2018] furosemide 40 mg Oral Daily Janneth Zavala MD   heparin (porcine) 5,000 Units Subcutaneous Q8H [facility] TANVI Nuno   [START ON 5/8/2018] levothyroxine 88 mcg Oral Early Morning TANVI Nuno   metoprolol 5 mg Intravenous Q6H PRN TANVI Nuno   metoprolol tartrate 50 mg Oral Q6H Melina Stahl MD   nitroglycerin 0 4 mg Sublingual Q5 Min PRN Angela Velez PA-C   potassium chloride 20 mEq Oral Daily TANVI Nuno   senna-docusate sodium 1 tablet Oral HS TANVI Nuno   warfarin 2 5 mg Oral Daily (warfarin) TANVI Nuno            Allergies   Allergen Reactions    Ramipril Anaphylaxis    Spiriva Handihaler [Tiotropium Bromide Monohydrate] Swelling    Penicillins        ROS: Negative except increased dyspnea    Physical Exam:  Gen:ill appearing, CA&Ox3  HEENT:normocephalic, PEARLA, sclera anicteric, EOM intact  Neck:supple, no JVD @ 45 deg angle, trachea midline w/o tugging  Chest:Lungs, course rales b/l  Cor:HRIR, tachycardic  Abd:SNT, no palpable masses  Ext:no peripheral edema or clubbing  Neuro:CA&O x3, no focal deficits  Skin:cool, dry      Intake/Output Summary (Last 24 hours) at 05/07/18 2019  Last data filed at 05/07/18 1132   Gross per 24 hour   Intake              500 ml   Output                0 ml   Net              500 ml       Respiratory    Lab Data (Last 4 hours)      05/07 1955            pH, Arterial       7 374           pCO2, Arterial       39 9           pO2, Arterial       (!)59 3           HCO3, Arterial       22 8           Base Excess, Arterial       -2 2                O2/Vent Data       05/07 2000   Most Recent        Non-Invasive Ventilation Mode BiPAP  BiPAP                Invasive Devices     Peripheral Intravenous Line            Peripheral IV 05/07/18 Right Antecubital less than 1 day          Drain            Urethral Catheter Latex 16 Fr  23 days    Urethral Catheter less than 1 day                DIAGNOSTIC DATA:    Lab: I have personally reviewed pertinent lab results  CBC:     Results from last 7 days  Lab Units 05/07/18  1032   WBC Thousand/uL 5 63   HEMOGLOBIN g/dL 11 3*   HEMATOCRIT % 35 2   PLATELETS Thousands/uL 163     CMP:     Results from last 7 days  Lab Units 05/07/18  1032   SODIUM mmol/L 146*   POTASSIUM mmol/L 3 6   CHLORIDE mmol/L 108   CO2 mmol/L 32   BUN mg/dL 32*   CREATININE mg/dL 1 95*   CALCIUM mg/dL 9 2   TOTAL PROTEIN g/dL 7 1   BILIRUBIN TOTAL mg/dL 0 50   ALK PHOS U/L 51   ALT U/L 34   AST U/L 25   GLUCOSE RANDOM mg/dL 187*     PT/INR:   Lab Results   Component Value Date    INR 1 80 (H) 05/07/2018   ,   Magnesium: No results found for: MAG,   Phosphorous: No results found for: PHOS    Microbiology:  Lab Results   Component Value Date    BLOODCX No Growth After 5 Days  12/10/2017    BLOODCX No Growth After 5 Days  12/10/2017    URINECX >100,000 cfu/ml Enterococcus faecalis (A) 03/22/2018    URINECX 40,000-49,000 cfu/ml Stenotrophomonas maltophilia (A) 03/22/2018    URINECX >100,000 cfu/ml Citrobacter freundii (A) 12/10/2017    URINECX 50,000-59,000 cfu/ml Providencia rettgeri (A) 12/10/2017         OUTCOME:   Stayed in room  and Attending service notified  Family notified of transfer: yes, notified of RRT  Family member contacted: daughter  Code Status: Level 3 - DNAR and DNI  Critical Care Time: Total Critical Care time spent 20 minutes excluding procedures, teaching and family updates

## 2018-05-08 NOTE — PROGRESS NOTES
General Cardiology   Progress Note   Rachell Davis 71 y o  female MRN: [id number]  Unit/Bed#: -01 Encounter: [id number]      SUBJECTIVE:   Patient had hypoxic episode overnight, with O2 sats in 70s  Was found to have acute pulmonary edema  Currently, patient is sitting comfortably with O2 via nasal cannula  OBJECTIVE:   Vitals:  Vitals:    05/08/18 1100   BP: 100/59   Pulse: 101   Resp: 18   Temp: 97 9 °F (36 6 °C)   SpO2: 90%     Body mass index is 33 06 kg/m²  Systolic (12ADY), XWT:317 , Min:91 , RVF:352     Diastolic (72NPQ), CQV:80, Min:54, Max:107      Intake/Output Summary (Last 24 hours) at 05/08/18 1058  Last data filed at 05/08/18 0331   Gross per 24 hour   Intake              500 ml   Output             1200 ml   Net             -700 ml     Weight (last 2 days)     Date/Time   Weight    05/08/18 0600  82 (180 78)    05/07/18 1418  81 6 (179 9)    05/07/18 1002  86 6 (190 92)              PHYSICAL EXAMS:  General:  Patient is not in acute distress, laying in the bed comfortably, awake, alert responding to commands  Head: Normocephalic, Atraumatic  HEENT:  Both pupils normal-size atraumatic, normocephalic, nonicteric  Neck:  JVP not raised  Trachea central  Respiratory:  Bronchovascular breathing all over the chest without any accompaniment  Cardiovascular:  +irregular  No murmurs, rubs, gallops  GI:  Abdomen soft nontender   Liver and spleen normal size  Lymphatic:  No cervical or inguinal lymphadenopathy  Neurologic:  Patient is awake alert, responding to command, well-oriented to time and place and person moving     LABORATORY RESULTS:    Results from last 7 days  Lab Units 05/07/18  1032   TROPONIN I ng/mL <0 02       CBC with diff:   Results from last 7 days  Lab Units 05/08/18  0546 05/07/18  1032   WBC Thousand/uL 6 49 5 63   HEMOGLOBIN g/dL 11 4* 11 3*   HEMATOCRIT % 35 4 35 2   MCV fL 104* 105*   PLATELETS Thousands/uL 153 163   MCH pg 33 3 33 6   MCHC g/dL 32 2 32 1   RDW % 13 1 12 9   MPV fL 11 8 11 9   NRBC AUTO /100 WBCs  --  0       CMP:  Results from last 7 days  Lab Units 18  0546 18  1032   SODIUM mmol/L 144 146*   POTASSIUM mmol/L 4 2 3 6   CHLORIDE mmol/L 107 108   CO2 mmol/L 29 32   ANION GAP mmol/L 8 6   BUN mg/dL 34* 32*   CREATININE mg/dL 1 86* 1 95*   GLUCOSE RANDOM mg/dL 111 187*   CALCIUM mg/dL 9 3 9 2   AST U/L  --  25   ALT U/L  --  34   ALK PHOS U/L  --  51   TOTAL PROTEIN g/dL  --  7 1   BILIRUBIN TOTAL mg/dL  --  0 50   EGFR ml/min/1 73sq m 27 25       BMP:  Results from last 7 days  Lab Units 18  0546 18  1032   SODIUM mmol/L 144 146*   POTASSIUM mmol/L 4 2 3 6   CHLORIDE mmol/L 107 108   CO2 mmol/L 29 32   BUN mg/dL 34* 32*   CREATININE mg/dL 1 86* 1 95*   GLUCOSE RANDOM mg/dL 111 187*   CALCIUM mg/dL 9 3 9 2         Results from last 7 days  Lab Units 18  1032   NT-PRO BNP pg/mL 3,327*                    Results from last 7 days  Lab Units 18  0546 18  1032   INR  1 82* 1 80*       Lipid Profile:   No results found for: CHOL  No results found for: HDL  No results found for: LDLCALC  No results found for: TRIG    Cardiac testing:  Results for orders placed during the hospital encounter of 12/10/17   Echo complete with contrast if indicated    Narrative [address] [place] [address]  [phone]    Transthoracic Echocardiogram  2D, M-mode, Doppler, and Color Doppler    Study date:  11-Dec-2017    Patient: Bety Queen  MR number: [id number]  Account number: [de-identified]  : 1945  Age: 71 years  Gender: Female  Status: Inpatient  Location: Bedside  Height: 60 in  Weight: 192 lb  BP: 99/ 50 mmHg    Indications: Shortness of breath      Diagnoses: R06 00 - Dyspnea, unspecified, R06 02 - Shortness of breath    Sonographer:   [facility] [place]  Interpreting Physician:  Ibis Domínguez MD  Referring Physician:  Nancy Villasenor PA-C  Group:  [facility]    SUMMARY    LEFT VENTRICLE:  Systolic function was normal  Ejection fraction was estimated in the range of 55 % to 65 %  There were no regional wall motion abnormalities  There was mild concentric hypertrophy  Doppler parameters were consistent with abnormal left ventricular relaxation (grade 1 diastolic dysfunction)  MITRAL VALVE:  There was mild annular calcification  There was mild regurgitation  AORTIC VALVE:  There was mild regurgitation  TRICUSPID VALVE:  There was mild regurgitation  PULMONIC VALVE:  There was mild regurgitation  HISTORY: PRIOR HISTORY: Elevated trponin, Dementia, Seizures, Stroke, Myocardial infarction  Congestive heart failure  Risk factors: hypertension and hypercholesterolemia  Chronic lung disease  PROCEDURE: The procedure was performed at the bedside  This was a routine study  The transthoracic approach was used  The study included complete 2D imaging, M-mode, complete spectral Doppler, and color Doppler  The heart rate was 68 bpm,  at the start of the study  Images were obtained from the parasternal, apical, subcostal, and suprasternal notch acoustic windows  Echocardiographic views were limited due to poor acoustic window availability, decreased penetration, and  lung interference  This was a technically difficult study  LEFT VENTRICLE: Size was normal  Systolic function was normal  Ejection fraction was estimated in the range of 55 % to 65 %  There were no regional wall motion abnormalities  Wall thickness was normal  There was mild concentric  hypertrophy  DOPPLER: Doppler parameters were consistent with abnormal left ventricular relaxation (grade 1 diastolic dysfunction)  RIGHT VENTRICLE: The size was normal  Systolic function was normal  Wall thickness was normal     LEFT ATRIUM: Size was normal     RIGHT ATRIUM: Size was normal     MITRAL VALVE: There was mild annular calcification   Valve structure was normal  There was normal leaflet separation  DOPPLER: The transmitral velocity was within the normal range  There was no evidence for stenosis  There was mild  regurgitation  AORTIC VALVE: The valve was trileaflet  Leaflets exhibited normal thickness and normal cuspal separation  DOPPLER: Transaortic velocity was within the normal range  There was no evidence for stenosis  There was mild regurgitation  TRICUSPID VALVE: The valve structure was normal  There was normal leaflet separation  DOPPLER: The transtricuspid velocity was within the normal range  There was no evidence for stenosis  There was mild regurgitation  PULMONIC VALVE: Leaflets exhibited normal thickness, no calcification, and normal cuspal separation  DOPPLER: The transpulmonic velocity was within the normal range  There was mild regurgitation  PERICARDIUM: There was no pericardial effusion  The pericardium was normal in appearance  AORTA: The root exhibited normal size  SYSTEM MEASUREMENT TABLES    2D  %FS: 29 2 %  Ao Diam: 3 1 cm  EDV(Teich): 140 8 ml  EF(Teich): 55 6 %  ESV(Teich): 62 5 ml  IVSd: 1 1 cm  LA Area: 20 9 cm2  LA Diam: 3 7 cm  LVEDV MOD A4C: 117 1 ml  LVEF MOD A4C: 50 3 %  LVESV MOD A4C: 58 2 ml  LVIDd: 5 4 cm  LVIDs: 3 8 cm  LVLd A4C: 8 5 cm  LVLs A4C: 7 1 cm  LVPWd: 1 1 cm  RA Area: 17 5 cm2  RVIDd: 3 2 cm  SV MOD A4C: 59 ml  SV(Teich): 78 3 ml    CW  AR Dec Stone: 1 9 m/s2  AR Dec Time: 2208 1 ms  AR PHT: 640 4 ms  AR Vmax: 4 1 m/s  AR maxP 4 mmHg  TR Vmax: 2 5 m/s  TR maxP mmHg    MM  TAPSE: 1 4 cm    PW  AVC: 389 7 ms  E': 0 1 m/s  E/E': 15 3  MV A Anoop: 1 m/s  MV Dec Stone: 3 4 m/s2  MV DecT: 240 3 ms  MV E Anoop: 0 8 m/s  MV E/A Ratio: 0 8  MV PHT: 69 7 ms  MVA By PHT: 3 2 cm2    [employer] Accredited Echocardiography Laboratory    Prepared and electronically signed by    Meena Temple MD  Signed 11-Dec-2017 12:09:00           No procedure found        Meds/Allergies   all current active meds have been reviewed and current meds:   Current Facility-Administered Medications   Medication Dose Route Frequency    acetaminophen (TYLENOL) tablet 488 mg  488 mg Oral Q6H PRN    albuterol (PROVENTIL HFA,VENTOLIN HFA) inhaler 2 puff  2 puff Inhalation Q6H PRN    albuterol inhalation solution 2 5 mg  2 5 mg Nebulization Q6H PRN    aspirin (ECOTRIN LOW STRENGTH) EC tablet 81 mg  81 mg Oral Daily    atorvastatin (LIPITOR) tablet 10 mg  10 mg Oral Daily    buPROPion (WELLBUTRIN SR) 12 hr tablet 150 mg  150 mg Oral Daily    carBAMazepine (TEGretol) chewable tablet 100 mg  100 mg Oral TID    diltiazem (CARDIZEM SR) 12 hr capsule 120 mg  120 mg Oral Q12H TONI    divalproex sodium (DEPAKOTE ER) 24 hr tablet 250 mg  250 mg Oral Daily    ferrous sulfate tablet 325 mg  325 mg Oral BID    fluticasone-salmeterol (ADVAIR) 100-50 mcg/dose inhaler 1 puff  1 puff Inhalation Q12H [address]    [START ON 5/9/2018] furosemide (LASIX) tablet 60 mg  60 mg Oral Daily    heparin (porcine) subcutaneous injection 5,000 Units  5,000 Units Subcutaneous Q8H [address]    levothyroxine tablet 88 mcg  88 mcg Oral Early Morning    metoprolol (LOPRESSOR) injection 5 mg  5 mg Intravenous Q6H PRN    metoprolol tartrate (LOPRESSOR) tablet 50 mg  50 mg Oral Q6H    nitroglycerin (NITROSTAT) SL tablet 0 4 mg  0 4 mg Sublingual Q5 Min PRN    potassium chloride (K-DUR,KLOR-CON) CR tablet 20 mEq  20 mEq Oral Daily    senna-docusate sodium (SENOKOT S) 8 6-50 mg per tablet 1 tablet  1 tablet Oral HS    warfarin (COUMADIN) tablet 2 5 mg  2 5 mg Oral Daily (warfarin)     Prescriptions Prior to Admission   Medication    acetaminophen (TYLENOL) 500 mg tablet    albuterol (2 5 mg/3 mL) 0 083 % nebulizer solution    albuterol (PROVENTIL HFA,VENTOLIN HFA) 90 mcg/act inhaler    aspirin (ECOTRIN LOW STRENGTH) 81 mg EC tablet    atorvastatin (LIPITOR) 10 mg tablet    buPROPion (WELLBUTRIN SR) 150 mg 12 hr tablet    carBAMazepine (TEGretol) 100 mg chewable tablet    Cholecalciferol (VITAMIN D) 2000 units CAPS    diltiazem (CARDIZEM SR) 120 mg 12 hr capsule    divalproex sodium (DEPAKOTE ER) 250 mg 24 hr tablet    fenofibrate micronized (LOFIBRA) 67 MG capsule    ferrous sulfate 325 (65 Fe) mg tablet    fluticasone furoate-vilanterol (BREO ELLIPTA)    furosemide (LASIX) 40 mg tablet    Levothyroxine Sodium 88 MCG CAPS    metoprolol tartrate (LOPRESSOR) 50 mg tablet    potassium chloride (K-DUR,KLOR-CON) 20 mEq tablet    Sennosides-Docusate Sodium (SENNA PLUS PO)    warfarin (COUMADIN) 2 mg tablet          ASSESSMENT & PLAN   1  Atrial flutter with RVR:  HR 90s-100s currently  Continue Lopressor to 50 mg Q6  Continue Cardizem  mg b i d   Continue Coumadin for anticoagulation      2  Chronic diastolic Congestive heart failure:  -BNP 3327  CXR shows stable cardiomegaly  -ECHO from December 2017 shows EF 55-65%, no regional wall motion abnormalities, grade 1 diastolic dysfunction  -currently euvolemic, Not currently in exacerbation   -creatinine 1 86, mildly improved  Continue lasix at present dose   -continue Lopressor   -low-salt diet, daily weights, I/O      3  CAD S/P PCI:  No anginal symptoms at this time  Continue aspirin, statin, Lopressor      4  Hypertension:  Stable, continue present regimen  Will increase Lopressor as above given rapid heart rate      5  Hyperlipidemia:  Continue statin  Chel Gifford PA-C  5/8/2018,10:58 AM    Portions of the record may have been created with voice recognition software   Occasional wrong word or "sound a like" substitutions may have occurred due to the inherent limitations of voice recognition software   Read the chart carefully and recognize, using context, where substitutions have occurred

## 2018-05-08 NOTE — CASE MANAGEMENT
Initial Clinical Review    Admission: Date/Time/Statement: 5/7/18 @ 1155 Inpatient Written     Orders Placed This Encounter   Procedures    Inpatient Admission (expected length of stay for this patient is greater than two midnights)     Standing Status:   Standing     Number of Occurrences:   1     Order Specific Question:   Admitting Physician     Answer:   Geneva Parker     Order Specific Question:   Level of Care     Answer:   Med Surg [16]     Order Specific Question:   Estimated length of stay     Answer:   More than 2 Midnights     Order Specific Question:   Certification     Answer:   I certify that inpatient services are medically necessary for this patient for a duration of greater than two midnights  See H&P and MD Progress Notes for additional information about the patient's course of treatment  ED: Date/Time/Mode of Arrival:   ED Arrival Information     Expected Arrival Acuity Means of Arrival Escorted By Service Admission Type    - 5/7/2018 09:58 Urgent Ambulance [address]  [facility] General Medicine Urgent    Arrival Complaint    [address] PAIN          Chief Complaint:   Chief Complaint   Patient presents with    Atrial Fibrillation     Pt presents at request of [facility] care home for Afib (prior hx); On EMS arrival pt had c/o SOB  On ED arrival, pt has no complaints  History of Illness: 60-year-old female past history AFib on Coumadin brought in by ambulance from assisted living facility for an rapid heart rate as well as shortness of breath  Patient has a past history of COPD as well as atrial flutter/fib  She has had recent admissions for rapid a flutter and has had her medications adjusted  She is on Cardizem follows up with cardiology  Currently patient has no complaints  She is satting 89% on room air she is not maintained on oxygen and heart rate shows rapid a flutter in the 1 teens otherwise hemodynamically stable   Pt has a leg bag for incontinence  ED Vital Signs:   ED Triage Vitals   Temperature Pulse Respirations Blood Pressure SpO2   05/07/18 1020 05/07/18 1002 05/07/18 1002 05/07/18 1002 05/07/18 1002   98 6 °F (37 °C) (!) 110 20 100/55 (!) 89 %      Temp Source Heart Rate Source Patient Position - Orthostatic VS BP Location FiO2 (%)   05/07/18 1020 05/07/18 1002 05/07/18 1002 05/07/18 1002 --   Oral Monitor Lying Left arm       Pain Score       05/07/18 1200       No Pain        Wt Readings from Last 1 Encounters:   05/08/18 82 kg (180 lb 12 4 oz)       Vital Signs (abnormal):   Date/Time  Temp  Pulse  Resp  BP  SpO2  O2 Device   05/08/18 0847  --   120  --  108/59  --  --   05/08/18 0840  --   120  --  108/59  --  --   05/08/18 0700   97 4 °F (36 3 °C)  84  20  103/59  97 %  Other (comment)   05/08/18 0427  --  --  --  --  96 %  --   05/08/18 0331  97 5 °F (36 4 °C)  98  20  91/55  --  Simple mask   05/07/18 2300  97 9 °F (36 6 °C)   119  19  101/70  100 %  --   05/07/18 2000  --  --  --  --  91 %  --   05/07/18 1953  --   122  --   163/107   88 %  --   05/07/18 1945  --   122  --   189/99   74 %  None (Room air)   05/07/18 1515  --   123   24  --  93 %  None (Room air)   05/07/18 1500  98 °F (36 7 °C)  102  18  155/72  94 %  None (Room air)   05/07/18 1319  98 6 °F (37 °C)  69  20  122/72  91 %  None (Room air)   05/07/18 1200  --  102  21  109/54  92 %  Nasal cannula   05/07/18 1020  98 6 °F (37 °C)   116  18  107/63  93 %  Nasal cannula   05/07/18 1002  --   110  20  100/55   89 %  None (Room air)     Abnormal Labs/Diagnostic Test Results:   HEMOGLOBIN 11 3*     SODIUM 146*   BUN 32*   CREATININE 1 95*   GLUCOSE RANDOM 187*     D-Dimer, Quant 477       Protime 21 4     INR 1 80       NT-proBNP 3,327       Leukocytes, UA Trace     Nitrite, UA Positive     Blood, UA Small       RBC, UA 4-10     WBC, UA 1-2       pO2, Arterial 59 3     O2 HGB,Arterial  86 7       CXR:  Stable cardiomegaly    Otherwise no active disease in the chest   EKG:  atrial flutter abnormal ECG    ED Treatment:   Medication Administration from 05/07/2018 0958 to 05/07/2018 1304       Date/Time Order Dose Route Action     05/07/2018 1032 sodium chloride 0 9 % bolus 500 mL 500 mL Intravenous New Bag          Past Medical/Surgical History:    Active Ambulatory Problems     Diagnosis Date Noted    HTN (hypertension) 12/10/2017    [facility]ch[facility] disease) [facility] ) 12/10/2017    Chronic diastolic congestive heart failure ([facility] ) 12/10/2017    Acute respiratory failure with hypoxia (HCC) 03/22/2018    Atrial flutter ([facility] ) 03/22/2018    Back pain 03/22/2018    Dementia 03/23/2018    HLD (hyperlipidemia) 03/23/2018    UTI (urinary tract infection) 03/24/2018    Seizure disorder ([facility] ) 03/24/2018    RSV infection 03/24/2018    Ambulatory dysfunction 03/29/2018    Hypernatremia 04/13/2018    Stage 3 chronic kidney disease 04/13/2018    Bilateral leg edema 04/13/2018    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 04/18/2018    JESENIA (obstructive sleep apnea) 05/04/2018    Intermittent claudication of both lower extremities due to atherosclerosis ([facility] ) 05/04/2018     Resolved Ambulatory Problems     Diagnosis Date Noted    Cystitis 12/10/2017    Elevated troponin 12/10/2017    TAM (acute kidney injury) ([facility] ) 12/10/2017     Past Medical History:   Diagnosis Date    Atrial fibrillation ([facility] )     Brain aneurysm     CAD (coronary artery disease)     Cardiac disease     CHF (congestive heart failure) (HCC)     CKD (chronic kidney disease)     COPD (chronic obstructive pulmonary disease) (HCC)     Dementia     Dementia     Disease of thyroid gland     Hyperlipidemia     Hyperlipidemia     Hypertension     MI (myocardial infarction) ([facility] )     Migraine     Renal disorder     Seizures ([facility] )     Stroke [facility])        Admitting Diagnosis: Atrial fibrillation ([facility] ) [I48 91]  TAM (acute kidney injury) ([facility] ) [N17 9]  Atrial flutter with rapid ventricular response (HCC) [I48 92]    Age/Sex: 71 y o  female    Assessment/Plan:   * Atrial fibrillation ([facility] )   Assessment & Plan     · Known history with recent diagnosis patient is currently showing on ECG and telemetry atrial flutter with RVR   · Patient presented with some intermittent RVR in the ED  · Continue home dose of Cardizem and metoprolol  · Will add p r n  metoprolol 5 mg for sustained rate greater than 120  · Patient is on Coumadin her INR is subtherapeutic at 1 8 will start subq heparin to bridge until her INR becomes therapeutic  · Will continue home dose of Coumadin repeat INR in a m  · Telemetry monitoring  · Cardiology consult  · Patient is known to Dr Komal Calle there is concern sleep apnea could be contributing to patient's arrhythmia    Will do continuous pulse ox at bedtime tonight apply oxygen as needed and will consult pulmonology given this is the patient's 3rd admission for AFib/atrial flutter          Stage 3 chronic kidney disease   Assessment & Plan     · CKD stage 3 with a baseline creatinine of 1 5  · Patient appears to have an acute kidney injury on CKD  · Patient was given a gentle bolus of 500 cc in the emergency department  · Monitor closely as patient is typically on Lasix will hold her p m  dose for tonight   · Will check a BMP in the a m           Chronic diastolic congestive heart failure (HCC)   Assessment & Plan     · BNP BMP is greater than 3000 however this is less than 1 was in April patient is not showing clinical signs of volume overload  · Chest x-ray is not showing evidence of vascular congestion  · Will continue home dose of Lasix and potassium          COPD (chronic obstructive pulmonary disease) ([facility] )   Assessment & Plan     · Does not appear in acute exacerbation  · Patient did have mild hypoxia in the ED this overall improved with oxygen  · Respiratory protocol  · Continue p r n  nebulizers          Dementia   Assessment & Plan     · Comfort measures          HTN (hypertension)   Assessment & Plan     · Blood pressure soft  · Continue current regimen with parameters          Seizure disorder (HCC)   Assessment & Plan     · Seizure precautions  · Continue Tegretol and Depakote          Ambulatory dysfunction   Assessment & Plan     · Out of bed with assistance  · Bed alarm for safety   · PT/OT evaluation             VTE Prophylaxis: Warfarin (Coumadin) bridge with subQ heparin given subtherapeutic INR / sequential compression device   Code Status:  DNR DNI  POLST: POLST is not applicable to this patient  Discussion with family:  Daughter at bedside     Anticipated Length of Stay:  Patient will be admitted on an Inpatient basis with an anticipated length of stay of  > 2 midnights     Justification for Hospital Stay:  Atrial fib/atrial flutter with RVR with need for further acute intervention     Admission Orders:  Telemetry  OOB with assist  Daily weights, I/O  Bipap  Sequential compression device  Consult cardiology, pulmonology  EKG    Scheduled Meds:   Current Facility-Administered Medications:  acetaminophen 488 mg Oral Q6H PRN   albuterol 2 puff Inhalation Q6H PRN   albuterol 2 5 mg Nebulization Q6H PRN   aspirin 81 mg Oral Daily   atorvastatin 10 mg Oral Daily   buPROPion 150 mg Oral Daily   carBAMazepine 100 mg Oral TID   diltiazem 120 mg Oral Q12H TONI   divalproex sodium 250 mg Oral Daily   ferrous sulfate 325 mg Oral BID   fluticasone-salmeterol 1 puff Inhalation Q12H TONI   furosemide 40 mg Oral Daily   heparin (porcine) 5,000 Units Subcutaneous Q8H [address]   levothyroxine 88 mcg Oral Early Morning   metoprolol 5 mg Intravenous Q6H PRN   metoprolol tartrate 50 mg Oral Q6H   nitroglycerin 0 4 mg Sublingual Q5 Min PRN   potassium chloride 20 mEq Oral Daily   senna-docusate sodium 1 tablet Oral HS   warfarin 2 5 mg Oral Daily (warfarin)     Continuous Infusions:    PRN Meds:   acetaminophen    albuterol    albuterol    metoprolol   nitroglycerin    Thank you,  [address] [facility] in the [facility] by Ronald King for 2017  Network Utilization Review Department  Phone: [phone]; Fax [phone]  ATTENTION: The Network Utilization Review Department is now centralized for our 7 Facilities  Make a note that we have a new phone and fax numbers for our Department  Please call with any questions or concerns to [phone] and carefully follow the prompts so that you are directed to the right person  All voicemails are confidential  Fax any determinations, approvals, denials, and requests for initial or continue stay review clinical to [phone]  Due to HIGH CALL volume, it would be easier if you could please send faxed requests to expedite your requests and in part, help us provide discharge notifications faster

## 2018-05-08 NOTE — RAPID RESPONSE
Please see the rapid response note done by critical care  Rapid response call secondary to acute shortness of breath and hypoxia  Patient's oxygen saturations were in the 70s  The patient was refusing to her oxygen before  The patient came in she was acutely short of breath  She had decreased breath sounds at the bases  She did have some wheezing but more than that she had some crackles at the bases  She seems more or less to be in flash pulmonary edema  ABG was done  Patient's pH was within normal limits  PO2 was slightly on the lower side  Patient was put on BiPAP for comfort and to decrease the work of breathing  After the 40 of Lasix she is feeling better but still did start diuresing yet  Will continue to keep an INR  Terrance from the cuticle care team evaluated the patient and was here at the bedside  Will follow up with the patient's response with the diuretics and continue BiPAP for now  Hopefully we can wean the BiPAP off later this evening  Continue to monitor closely  Chest x-ray to be done  I did inform the daughter as well    The patient confirms that she is a DNR DNI

## 2018-05-08 NOTE — PROGRESS NOTES
Progress Note - Pulmonary   Voncille Livers 71 y o  female MRN: [id number]  Unit/Bed#: -01 Encounter: [id number]    Assessment:  1  Acute hypoxic respiratory insufficiency  2  A flutter with RVR  3  Acute Pulmonary Edema   4  COPD without acute exacerbation  5  Obesity, possible JESENIA     Plan:  1  Acute hypoxic respiratory insuffiencey 2/2 Acute Pulmonary Edema + A flutter with RVR  -currently saturating 95% on 2L via NC; episode of hypoxia yesterday into the 70's 2/2 acute pulmonary edema - patient was placed on BiPAP with improvement in WOB and hypoxia  -patient should be evaluated for home oxygen prior to discharge   -patient did not have dose of lasix yesterday prior to rapid response 2/2 TAM  -CXR with worsening pulmonary edema    -IV lasix per primary team   -accurate I&Os  -cardiology following, increased Lopressor to 50 mg Q 6 yesterday    -Troponin normal   2  COPD without acute exacerbation  -reviewed PFTs completed at [facility] with mildly decreased FVC and DLCO; normal FEV1/FVC ratio; no obstructive limitation  -continue Advair while in hospital (on Breo as an outpatient?)  -continue p r n  neb treatments/rescue inhaler  3  Obesity, possible JESENIA  -recomend weight loss  -would recommend outpatient in-lab sleep study especially given recurrent bouts of AFib/a flutter; discussed with daughter - will order for patient on day of discharge w/ outpatient f/u to review results    Chief Complaint:   SOB    Subjective:   Patient cannot remember what happened last night, but according to notes and daughter rapid response was called  Apparently, patient got up to go to the bathroom and started complaining of significant shortness of breath  She was refusing any oxygen at this time  Pulse ox was recorded to be in the 70s  Patient started to turn pale with increased work of breathing  Rapid response was initiated  CXR demonstrated significant worsening pulmonary edema    IV Lasix and BiPAP were initiated with improvement in symptoms  Patient is now sitting comfortably in the bed off BiPAP on nasal cannula  Objective:     Vitals: Blood pressure 108/59, pulse (!) 120, temperature (!) 97 4 °F (36 3 °C), temperature source Axillary, resp  rate 20, height 5' 2" (1 575 m), weight 82 kg (180 lb 12 4 oz), SpO2 95 %  ,Body mass index is 33 06 kg/m²  Intake/Output Summary (Last 24 hours) at 05/08/18 0953  Last data filed at 05/08/18 0331   Gross per 24 hour   Intake              500 ml   Output             1200 ml   Net             -700 ml       Invasive Devices     Peripheral Intravenous Line            Peripheral IV 05/07/18 Right Antecubital less than 1 day          Drain            Urethral Catheter Latex 16 Fr  24 days    Urethral Catheter less than 1 day                Physical Exam: /59   Pulse (!) 120   Temp (!) 97 4 °F (36 3 °C) (Axillary)   Resp 20   Ht 5' 2" (1 575 m)   Wt 82 kg (180 lb 12 4 oz)   SpO2 95%   BMI 33 06 kg/m²   General appearance: alert and oriented, in no acute distress and cooperative  Head: Normocephalic, without obvious abnormality, atraumatic  Lungs: Bibasilar rales otherwise clear to auscultation bilaterally  Heart: Irregular rhythm, tachycardic  Extremities: extremities normal, warm and well-perfused; no cyanosis, clubbing, or edema  Skin: Skin color, texture, turgor normal  No rashes or lesions  Neurologic: Mental status: Alert, oriented, thought content appropriate     Labs: I have personally reviewed pertinent lab results  , CBC:   Lab Results   Component Value Date    WBC 6 49 05/08/2018    HGB 11 4 (L) 05/08/2018    HCT 35 4 05/08/2018     (H) 05/08/2018     05/08/2018    MCH 33 3 05/08/2018    MCHC 32 2 05/08/2018    RDW 13 1 05/08/2018    MPV 11 8 05/08/2018    NRBC 0 05/07/2018   , CMP:   Lab Results   Component Value Date     05/08/2018    K 4 2 05/08/2018     05/08/2018    CO2 29 05/08/2018    ANIONGAP 8 05/08/2018    BUN 34 (H) 05/08/2018    CREATININE 1 86 (H) 05/08/2018    GLUCOSE 111 05/08/2018    CALCIUM 9 3 05/08/2018    AST 25 05/07/2018    ALT 34 05/07/2018    ALKPHOS 51 05/07/2018    PROT 7 1 05/07/2018    BILITOT 0 50 05/07/2018    EGFR 27 05/08/2018     Imaging and other studies: I have personally reviewed pertinent reports

## 2018-05-08 NOTE — PROGRESS NOTES
Approx 0845, Dt Magi Jose made aware that patient's  and bp 108/59 and there are  Holding parameters of sbp less than 110  Undersigned was directed to give lopressor am dose now  Given   Pt remains symptomatic   Will continue to monitor

## 2018-05-08 NOTE — PLAN OF CARE
Problem: Potential for Falls  Goal: Patient will remain free of falls  INTERVENTIONS:  - Assess patient frequently for physical needs  -  Identify cognitive and physical deficits and behaviors that affect risk of falls    -  [place] fall precautions as indicated by assessment   - Educate patient/family on patient safety including physical limitations  - Instruct patient to call for assistance with activity based on assessment  - Modify environment to reduce risk of injury  - Consider OT/PT consult to assist with strengthening/mobility   Outcome: Progressing      Problem: DISCHARGE PLANNING - CARE MANAGEMENT  Goal: Discharge to post-acute care or home with appropriate resources  INTERVENTIONS:  - Conduct assessment to determine patient/family and health care team treatment goals, and need for post-acute services based on payer coverage, community resources, and patient preferences, and barriers to discharge  - Address psychosocial, clinical, and financial barriers to discharge as identified in assessment in conjunction with the patient/family and health care team  - Arrange appropriate level of post-acute services according to patients   needs and preference and payer coverage in collaboration with the physician and health care team  - Communicate with and update the patient/family, physician, and health care team regarding progress on the discharge plan  - Arrange appropriate transportation to post-acute venues   Outcome: Progressing      Problem: Prexisting or High Potential for Compromised Skin Integrity  Goal: Skin integrity is maintained or improved  INTERVENTIONS:  - Identify patients at risk for skin breakdown  - Assess and monitor skin integrity  - Assess and monitor nutrition and hydration status  - Monitor labs (i e  albumin)  - Assess for incontinence   - Turn and reposition patient  - Assist with mobility/ambulation  - Relieve pressure over bony prominences  - Avoid friction and shearing  - Provide appropriate hygiene as needed including keeping skin clean and dry  - Evaluate need for skin moisturizer/barrier cream  - Collaborate with interdisciplinary team (i e  Nutrition, Rehabilitation, etc )   - Patient/family teaching   Outcome: Progressing

## 2018-05-09 PROBLEM — J96.20 ACUTE ON CHRONIC RESPIRATORY FAILURE (HCC): Status: ACTIVE | Noted: 2018-03-22

## 2018-05-09 LAB
ANION GAP SERPL CALCULATED.3IONS-SCNC: 9 MMOL/L (ref 4–13)
BUN SERPL-MCNC: 30 MG/DL (ref 5–25)
CALCIUM SERPL-MCNC: 9 MG/DL (ref 8.3–10.1)
CHLORIDE SERPL-SCNC: 105 MMOL/L (ref 100–108)
CO2 SERPL-SCNC: 28 MMOL/L (ref 21–32)
CREAT SERPL-MCNC: 1.61 MG/DL (ref 0.6–1.3)
ERYTHROCYTE [DISTWIDTH] IN BLOOD BY AUTOMATED COUNT: 12.7 % (ref 11.6–15.1)
GFR SERPL CREATININE-BSD FRML MDRD: 32 ML/MIN/1.73SQ M
GLUCOSE SERPL-MCNC: 129 MG/DL (ref 65–140)
HCT VFR BLD AUTO: 35.1 % (ref 34.8–46.1)
HGB BLD-MCNC: 11.3 G/DL (ref 11.5–15.4)
INR PPP: 2.07 (ref 0.86–1.16)
MCH RBC QN AUTO: 33 PG (ref 26.8–34.3)
MCHC RBC AUTO-ENTMCNC: 32.2 G/DL (ref 31.4–37.4)
MCV RBC AUTO: 103 FL (ref 82–98)
PLATELET # BLD AUTO: 150 THOUSANDS/UL (ref 149–390)
PMV BLD AUTO: 11.2 FL (ref 8.9–12.7)
POTASSIUM SERPL-SCNC: 3.7 MMOL/L (ref 3.5–5.3)
PROTHROMBIN TIME: 23.9 SECONDS (ref 12.1–14.4)
RBC # BLD AUTO: 3.42 MILLION/UL (ref 3.81–5.12)
SODIUM SERPL-SCNC: 142 MMOL/L (ref 136–145)
WBC # BLD AUTO: 5.48 THOUSAND/UL (ref 4.31–10.16)

## 2018-05-09 PROCEDURE — 94762 N-INVAS EAR/PLS OXIMTRY CONT: CPT

## 2018-05-09 PROCEDURE — 99232 SBSQ HOSP IP/OBS MODERATE 35: CPT | Performed by: INTERNAL MEDICINE

## 2018-05-09 PROCEDURE — 85027 COMPLETE CBC AUTOMATED: CPT | Performed by: INTERNAL MEDICINE

## 2018-05-09 PROCEDURE — 85610 PROTHROMBIN TIME: CPT | Performed by: NURSE PRACTITIONER

## 2018-05-09 PROCEDURE — 80048 BASIC METABOLIC PNL TOTAL CA: CPT | Performed by: INTERNAL MEDICINE

## 2018-05-09 PROCEDURE — 99231 SBSQ HOSP IP/OBS SF/LOW 25: CPT | Performed by: INTERNAL MEDICINE

## 2018-05-09 RX ORDER — DILTIAZEM HYDROCHLORIDE 90 MG/1
180 CAPSULE, EXTENDED RELEASE ORAL EVERY 12 HOURS SCHEDULED
Status: DISCONTINUED | OUTPATIENT
Start: 2018-05-09 | End: 2018-05-12 | Stop reason: HOSPADM

## 2018-05-09 RX ADMIN — FUROSEMIDE 60 MG: 40 TABLET ORAL at 09:21

## 2018-05-09 RX ADMIN — FLUTICASONE PROPIONATE AND SALMETEROL 1 PUFF: 50; 100 POWDER RESPIRATORY (INHALATION) at 20:39

## 2018-05-09 RX ADMIN — Medication 325 MG: at 17:30

## 2018-05-09 RX ADMIN — Medication 325 MG: at 09:21

## 2018-05-09 RX ADMIN — DILTIAZEM HYDROCHLORIDE 180 MG: 90 CAPSULE, EXTENDED RELEASE ORAL at 20:39

## 2018-05-09 RX ADMIN — DIVALPROEX SODIUM 250 MG: 250 TABLET, FILM COATED, EXTENDED RELEASE ORAL at 09:25

## 2018-05-09 RX ADMIN — ASPIRIN 81 MG: 81 TABLET, COATED ORAL at 09:21

## 2018-05-09 RX ADMIN — ACETAMINOPHEN 488 MG: 325 TABLET, FILM COATED ORAL at 09:42

## 2018-05-09 RX ADMIN — CARBAMAZEPINE 100 MG: 100 TABLET, CHEWABLE ORAL at 20:38

## 2018-05-09 RX ADMIN — CARBAMAZEPINE 100 MG: 100 TABLET, CHEWABLE ORAL at 16:42

## 2018-05-09 RX ADMIN — DILTIAZEM HYDROCHLORIDE 120 MG: 60 CAPSULE, EXTENDED RELEASE ORAL at 09:22

## 2018-05-09 RX ADMIN — LEVOTHYROXINE SODIUM 88 MCG: 88 TABLET ORAL at 05:12

## 2018-05-09 RX ADMIN — ATORVASTATIN CALCIUM 10 MG: 10 TABLET, FILM COATED ORAL at 09:22

## 2018-05-09 RX ADMIN — METOPROLOL TARTRATE 50 MG: 50 TABLET ORAL at 15:15

## 2018-05-09 RX ADMIN — WARFARIN SODIUM 2.5 MG: 2.5 TABLET ORAL at 17:30

## 2018-05-09 RX ADMIN — HEPARIN SODIUM 5000 UNITS: 5000 INJECTION, SOLUTION INTRAVENOUS; SUBCUTANEOUS at 05:12

## 2018-05-09 RX ADMIN — CARBAMAZEPINE 100 MG: 100 TABLET, CHEWABLE ORAL at 09:21

## 2018-05-09 RX ADMIN — HEPARIN SODIUM 5000 UNITS: 5000 INJECTION, SOLUTION INTRAVENOUS; SUBCUTANEOUS at 13:16

## 2018-05-09 RX ADMIN — POTASSIUM CHLORIDE 20 MEQ: 1500 TABLET, EXTENDED RELEASE ORAL at 09:22

## 2018-05-09 RX ADMIN — FLUTICASONE PROPIONATE AND SALMETEROL 1 PUFF: 50; 100 POWDER RESPIRATORY (INHALATION) at 09:22

## 2018-05-09 RX ADMIN — METOPROLOL TARTRATE 50 MG: 50 TABLET ORAL at 09:22

## 2018-05-09 RX ADMIN — HEPARIN SODIUM 5000 UNITS: 5000 INJECTION, SOLUTION INTRAVENOUS; SUBCUTANEOUS at 20:39

## 2018-05-09 RX ADMIN — STANDARDIZED SENNA CONCENTRATE AND DOCUSATE SODIUM 1 TABLET: 8.6; 5 TABLET, FILM COATED ORAL at 20:40

## 2018-05-09 RX ADMIN — METOPROLOL TARTRATE 50 MG: 50 TABLET ORAL at 23:40

## 2018-05-09 RX ADMIN — BUPROPION HYDROCHLORIDE 150 MG: 150 TABLET, FILM COATED, EXTENDED RELEASE ORAL at 09:21

## 2018-05-09 NOTE — ASSESSMENT & PLAN NOTE
· Out of bed with assistance, observed patient ambulating in [place]osh[place] with assistance, did well    · Bed alarm for safety   · PT/OT evaluation

## 2018-05-09 NOTE — ASSESSMENT & PLAN NOTE
· CKD stage 3 with a baseline creatinine of 1 5  · Patient appears to have an acute kidney injury on CKD  · Patient was given a gentle bolus of 500 cc in the emergency department  · Lasix being titrated  · Patient has single kidney with chronic indwelling baca catheter, due to be changed on Friday per daughter  · Will check a BMP in the a m

## 2018-05-09 NOTE — PROGRESS NOTES
Progress Note - Pulmonary   Silvano Caldwell 71 y o  female MRN: [id number]  Unit/Bed#: -01 Encounter: [id number]    Assessment:  1   Acute hypoxic respiratory insufficiency  2  A flutter with RVR  3   Acute Pulmonary Edema   4   COPD without acute exacerbation  5   Obesity, possible JESENIA     Plan:  1   Acute hypoxic respiratory insuffiencey 2/2 Acute Pulmonary Edema + A flutter with RVR  -currently saturating 91-95% on 2L via NC  -patient should be evaluated for home oxygen prior to discharge   -CXR with worsening pulmonary edema - IV lasix increased to 60 mg daily yesterday per Cardiology; improved renal function overnight to 1 61  -accurate I&Os (net negative about 1400 mL in the last 48 hours)  -cardiology following for management of arrhythmia    -Troponin normal   2   COPD without acute exacerbation  -reviewed PFTs completed at [facility] with mildly decreased FVC and DLCO; normal FEV1/FVC ratio; no obstructive limitation  -continue Advair while in hospital (on Breo as an outpatient?)  -continue p r n  neb treatments/rescue inhaler  3   Obesity, possible JESENIA  -recomend weight loss  -would recommend outpatient in-lab sleep study especially given recurrent bouts of AFib/a flutter; discussed with daughter - will order for patient on day of discharge w/ outpatient f/u to review results    Chief Complaint:   Cough    Subjective:   Patient reports shortness of breath is normal, however, she does have a mild cough  This is not productive  She does feel that it has improved in the last 24 hours  Denies any fever, chills, chest pain  Lasix was increased yesterday with good urine output  Objective:     Vitals: Blood pressure 123/80, pulse (!) 116, temperature 97 8 °F (36 6 °C), temperature source Oral, resp  rate 17, height 5' 2" (1 575 m), weight 85 kg (187 lb 6 3 oz), SpO2 92 %  ,Body mass index is 34 27 kg/m²        Intake/Output Summary (Last 24 hours) at 05/09/18 09  Last data filed at 05/09/18 0639   Gross per 24 hour   Intake              480 ml   Output             1225 ml   Net             -745 ml       Invasive Devices     Peripheral Intravenous Line            Peripheral IV 05/08/18 Left Hand less than 1 day          Drain            Urethral Catheter 1 day                Physical Exam: /80 (BP Location: Right arm)   Pulse (!) 116   Temp 97 8 °F (36 6 °C) (Oral)   Resp 17   Ht 5' 2" (1 575 m)   Wt 85 kg (187 lb 6 3 oz)   SpO2 92%   BMI 34 27 kg/m²   General appearance: alert and oriented, in no acute distress and cooperative  Head: Normocephalic, without obvious abnormality, atraumatic  Lungs: Faint bibasilar rales, otherwise clear to auscultation bilaterally  Heart: irregularly irregular rhythm  Skin: Skin color, texture, turgor normal  No rashes or lesions  Neurologic: Mental status: Alert, oriented, thought content appropriate     Labs: I have personally reviewed pertinent lab results  , CBC:   Lab Results   Component Value Date    WBC 5 48 05/09/2018    HGB 11 3 (L) 05/09/2018    HCT 35 1 05/09/2018     (H) 05/09/2018     05/09/2018    MCH 33 0 05/09/2018    MCHC 32 2 05/09/2018    RDW 12 7 05/09/2018    MPV 11 2 05/09/2018   , CMP:   Lab Results   Component Value Date     05/09/2018    K 3 7 05/09/2018     05/09/2018    CO2 28 05/09/2018    ANIONGAP 9 05/09/2018    BUN 30 (H) 05/09/2018    CREATININE 1 61 (H) 05/09/2018    GLUCOSE 129 05/09/2018    CALCIUM 9 0 05/09/2018    EGFR 32 05/09/2018     Imaging and other studies: I have personally reviewed pertinent reports

## 2018-05-09 NOTE — ASSESSMENT & PLAN NOTE
· Known history with recent diagnosis patient is currently showing on ECG and telemetry atrial flutter with RVR   · Patient presented with some intermittent RVR in the ED  · Continue home dose of Cardizem and metoprolol  · Will add p r n  metoprolol 5 mg for sustained rate greater than 120  · Patient is on Coumadin her INR is subtherapeutic at 1 82 will continue subq heparin to bridge until her INR becomes therapeutic  · Will continue home dose of Coumadin repeat INR in a m  · Telemetry monitoring: rate uncontrolled afib noted  · Cardiology consult:appreciated recommendations  · Patient is known to Dr Ludivina Bernard there is concern sleep apnea could be contributing to patient's arrhythmia    Will do continuous pulse ox at bedtime tonight apply oxygen as needed and will consult pulmonology given this is the patient's 3rd admission for AFib/atrial flutter

## 2018-05-09 NOTE — SOCIAL WORK
Pt is a possible d/c for tomorrow  SHABANA contacted [facility]  to sent out their representative to evaluate pt for return  Pooja Melendez is off today, but she will be given the message to come here tomorrow

## 2018-05-09 NOTE — ASSESSMENT & PLAN NOTE
· BNP BMP is greater than 3000 however this is less than 1 was in April patient is not showing clinical signs of volume overload  · Chest x-ray is not showing evidence of vascular congestion  · Acute on chronic exacerbation of edema last evening while in the bathroom off oxygen    Lasix given and uptitrated by cardiology this am  Appears improved this am

## 2018-05-09 NOTE — PLAN OF CARE
Problem: DISCHARGE PLANNING - CARE MANAGEMENT  Goal: Discharge to post-acute care or home with appropriate resources  INTERVENTIONS:  - Conduct assessment to determine patient/family and health care team treatment goals, and need for post-acute services based on payer coverage, community resources, and patient preferences, and barriers to discharge  - Address psychosocial, clinical, and financial barriers to discharge as identified in assessment in conjunction with the patient/family and health care team  - Arrange appropriate level of post-acute services according to patients   needs and preference and payer coverage in collaboration with the physician and health care team  - Communicate with and update the patient/family, physician, and health care team regarding progress on the discharge plan  - Arrange appropriate transportation to post-acute venues   Outcome: Progressing  Pt is a possible d/c for tomorrow  SHABANA contacted [facility]  to sent out their representative to evaluate pt for return  Dianelys is off today, but she will be given the message to come here tomorrow

## 2018-05-10 DIAGNOSIS — G47.33 OSA (OBSTRUCTIVE SLEEP APNEA): Primary | ICD-10-CM

## 2018-05-10 LAB
INR PPP: 2.13 (ref 0.86–1.16)
PROTHROMBIN TIME: 24.4 SECONDS (ref 12.1–14.4)

## 2018-05-10 PROCEDURE — 94762 N-INVAS EAR/PLS OXIMTRY CONT: CPT

## 2018-05-10 PROCEDURE — 99232 SBSQ HOSP IP/OBS MODERATE 35: CPT | Performed by: INTERNAL MEDICINE

## 2018-05-10 PROCEDURE — 94761 N-INVAS EAR/PLS OXIMETRY MLT: CPT

## 2018-05-10 PROCEDURE — 85610 PROTHROMBIN TIME: CPT | Performed by: INTERNAL MEDICINE

## 2018-05-10 PROCEDURE — 99232 SBSQ HOSP IP/OBS MODERATE 35: CPT | Performed by: PHYSICIAN ASSISTANT

## 2018-05-10 RX ADMIN — CARBAMAZEPINE 100 MG: 100 TABLET, CHEWABLE ORAL at 16:00

## 2018-05-10 RX ADMIN — STANDARDIZED SENNA CONCENTRATE AND DOCUSATE SODIUM 1 TABLET: 8.6; 5 TABLET, FILM COATED ORAL at 21:52

## 2018-05-10 RX ADMIN — ATORVASTATIN CALCIUM 10 MG: 10 TABLET, FILM COATED ORAL at 09:20

## 2018-05-10 RX ADMIN — FLUTICASONE PROPIONATE AND SALMETEROL 1 PUFF: 50; 100 POWDER RESPIRATORY (INHALATION) at 09:21

## 2018-05-10 RX ADMIN — FLUTICASONE PROPIONATE AND SALMETEROL 1 PUFF: 50; 100 POWDER RESPIRATORY (INHALATION) at 21:53

## 2018-05-10 RX ADMIN — ASPIRIN 81 MG: 81 TABLET, COATED ORAL at 09:20

## 2018-05-10 RX ADMIN — METOPROLOL TARTRATE 50 MG: 50 TABLET ORAL at 09:20

## 2018-05-10 RX ADMIN — DIVALPROEX SODIUM 250 MG: 250 TABLET, FILM COATED, EXTENDED RELEASE ORAL at 09:20

## 2018-05-10 RX ADMIN — HEPARIN SODIUM 5000 UNITS: 5000 INJECTION, SOLUTION INTRAVENOUS; SUBCUTANEOUS at 05:51

## 2018-05-10 RX ADMIN — CARBAMAZEPINE 100 MG: 100 TABLET, CHEWABLE ORAL at 09:20

## 2018-05-10 RX ADMIN — WARFARIN SODIUM 2.5 MG: 2.5 TABLET ORAL at 18:03

## 2018-05-10 RX ADMIN — DILTIAZEM HYDROCHLORIDE 180 MG: 90 CAPSULE, EXTENDED RELEASE ORAL at 09:21

## 2018-05-10 RX ADMIN — CARBAMAZEPINE 100 MG: 100 TABLET, CHEWABLE ORAL at 21:53

## 2018-05-10 RX ADMIN — Medication 325 MG: at 09:19

## 2018-05-10 RX ADMIN — HEPARIN SODIUM 5000 UNITS: 5000 INJECTION, SOLUTION INTRAVENOUS; SUBCUTANEOUS at 21:52

## 2018-05-10 RX ADMIN — POTASSIUM CHLORIDE 20 MEQ: 1500 TABLET, EXTENDED RELEASE ORAL at 09:20

## 2018-05-10 RX ADMIN — Medication 325 MG: at 18:03

## 2018-05-10 RX ADMIN — FUROSEMIDE 60 MG: 40 TABLET ORAL at 09:20

## 2018-05-10 RX ADMIN — BUPROPION HYDROCHLORIDE 150 MG: 150 TABLET, FILM COATED, EXTENDED RELEASE ORAL at 09:20

## 2018-05-10 RX ADMIN — DILTIAZEM HYDROCHLORIDE 180 MG: 90 CAPSULE, EXTENDED RELEASE ORAL at 21:53

## 2018-05-10 RX ADMIN — ACETAMINOPHEN 488 MG: 325 TABLET, FILM COATED ORAL at 09:18

## 2018-05-10 RX ADMIN — LEVOTHYROXINE SODIUM 88 MCG: 88 TABLET ORAL at 05:51

## 2018-05-10 RX ADMIN — HEPARIN SODIUM 5000 UNITS: 5000 INJECTION, SOLUTION INTRAVENOUS; SUBCUTANEOUS at 14:42

## 2018-05-10 NOTE — PROGRESS NOTES
Progress Note - Pulmonary   Eduardo Brito 71 y o  female MRN: [id number]  Unit/Bed#: -01 Encounter: [id number]    Assessment:  1  Acute hypoxemic respiratory insufficiency  2  Atrial Flutter  3  Acute pulmonary edema  4  COPD without exacerbation  5  Possible JESENIA    Plan:  1  Acute hypoxemic respiratory insufficiency secondary to pulmonary edema and AFlutter  - Sats are mid-high 90s on 2 5L NC, titrate to keep O2 sats > 89%  - Would do ambulatory sats prior to discharge  - Cardiology following for diuresis and rate control  2  COPD without exacerbation  - Continue with Advair, she uses Breo as outpatinet  - PRN nebs  3  Possible JESENIA  - Will order outpatient sleep study to be done in lab  Would benefit from treatment of JESENIA especially given her AFlutter    Will sign off, please re-consult if necessary  Patient should have outpatient sleep study done, order has been placed, follow up with us in the office  Subjective:   Patient resting in the chair  She is breathing well today, feels improved  Objective:     Vitals: Blood pressure 141/70, pulse 83, temperature 98 4 °F (36 9 °C), temperature source Oral, resp  rate 18, height 5' 2" (1 575 m), weight 85 2 kg (187 lb 13 3 oz), SpO2 95 %  ,Body mass index is 34 35 kg/m²        Intake/Output Summary (Last 24 hours) at 05/10/18 1012  Last data filed at 05/10/18 0900   Gross per 24 hour   Intake              360 ml   Output             2125 ml   Net            -1765 ml       Invasive Devices     Peripheral Intravenous Line            Peripheral IV 05/08/18 Left Hand 1 day          Drain            Urethral Catheter 2 days                Physical Exam: /70 (BP Location: Right arm)   Pulse 83   Temp 98 4 °F (36 9 °C) (Oral)   Resp 18   Ht 5' 2" (1 575 m)   Wt 85 2 kg (187 lb 13 3 oz)   SpO2 95%   BMI 34 35 kg/m²   General appearance: alert and oriented, in no acute distress  Eyes: PERRL  Lungs: Scattered crackles at the bases, no wheezing  Heart: Irregular rhythm  Extremities: extremities normal, warm and well-perfused; no cyanosis, clubbing, or edema  Neurologic: Mental status: Alert, oriented, thought content appropriate     Labs: I have personally reviewed pertinent lab results  , CBC: No results found for: WBC, HGB, HCT, MCV, PLT, ADJUSTEDWBC, MCH, MCHC, RDW, MPV, NRBC, CMP: No results found for: NA, K, CL, CO2, ANIONGAP, BUN, CREATININE, GLUCOSE, CALCIUM, AST, ALT, ALKPHOS, PROT, ALBUMIN, BILITOT, EGFR  Imaging and other studies: I have personally reviewed pertinent reports     and I have personally reviewed pertinent films in PACS

## 2018-05-10 NOTE — PROGRESS NOTES
General Cardiology   Progress Note   Mustapha Mail 71 y o  female MRN: [id number]  Unit/Bed#: -01 Encounter: [id number]      SUBJECTIVE:   HR under control  Denies chest pain, shortness of breath, lightheadedness, palpitations, leg swelling, syncope  Euvolemic on exam       OBJECTIVE:   Vitals:  Vitals:    05/10/18 1100   BP: 153/84   Pulse: 67   Resp: 18   Temp: 98 °F (36 7 °C)   SpO2: 93%     Body mass index is 34 35 kg/m²  Systolic (22NCQ), MQI:518 , Min:111 , MBD:298     Diastolic (93REH), TWF:07, Min:70, Max:100      Intake/Output Summary (Last 24 hours) at 05/10/18 1056  Last data filed at 05/10/18 1001   Gross per 24 hour   Intake              360 ml   Output             2475 ml   Net            -2115 ml     Weight (last 2 days)     Date/Time   Weight    05/10/18 0550  85 2 (187 83)    05/09/18 0550  85 (187 39)    05/08/18 0600  82 (180 78)              Telemetry Review: A Flutter    PHYSICAL EXAMS:  General:  Patient is not in acute distress, laying in the bed comfortably, awake, alert responding to commands  Head: Normocephalic, Atraumatic  HEENT:  Both pupils normal-size atraumatic, normocephalic, nonicteric  Neck:  JVP not raised  Trachea central  Respiratory:  Bronchovascular breathing all over the chest without any accompaniment  Cardiovascular:  +irregular  No murmurs, rubs, gallops  GI:  Abdomen soft nontender   Liver and spleen normal size  Lymphatic:  No cervical or inguinal lymphadenopathy  Neurologic:  Patient is awake alert, responding to command, well-oriented to time and place and person moving     LABORATORY RESULTS:    Results from last 7 days  Lab Units 05/07/18  1032   TROPONIN I ng/mL <0 02       CBC with diff:   Results from last 7 days  Lab Units 05/09/18  0525 05/08/18  0546 05/07/18  1032   WBC Thousand/uL 5 48 6 49 5 63   HEMOGLOBIN g/dL 11 3* 11 4* 11 3*   HEMATOCRIT % 35 1 35 4 35 2   MCV fL 103* 104* 105*   PLATELETS Thousands/uL 150 153 163   MCH pg 33 0 33 3 33 6 MCHC g/dL 32 2 32 2 32 1   RDW % 12 7 13 1 12 9   MPV fL 11 2 11 8 11 9   NRBC AUTO /100 WBCs  --   --  0       CMP:  Results from last 7 days  Lab Units 18  0525 18  0546 18  1032   SODIUM mmol/L 142 144 146*   POTASSIUM mmol/L 3 7 4 2 3 6   CHLORIDE mmol/L 105 107 108   CO2 mmol/L 28 29 32   ANION GAP mmol/L 9 8 6   BUN mg/dL 30* 34* 32*   CREATININE mg/dL 1 61* 1 86* 1 95*   GLUCOSE RANDOM mg/dL 129 111 187*   CALCIUM mg/dL 9 0 9 3 9 2   AST U/L  --   --  25   ALT U/L  --   --  34   ALK PHOS U/L  --   --  51   TOTAL PROTEIN g/dL  --   --  7 1   BILIRUBIN TOTAL mg/dL  --   --  0 50   EGFR ml/min/1 73sq m 32 27 25       BMP:  Results from last 7 days  Lab Units 18  0525 18  0546 18  1032   SODIUM mmol/L 142 144 146*   POTASSIUM mmol/L 3 7 4 2 3 6   CHLORIDE mmol/L 105 107 108   CO2 mmol/L 28 29 32   BUN mg/dL 30* 34* 32*   CREATININE mg/dL 1 61* 1 86* 1 95*   GLUCOSE RANDOM mg/dL 129 111 187*   CALCIUM mg/dL 9 0 9 3 9 2         Results from last 7 days  Lab Units 18  1032   NT-PRO BNP pg/mL 3,327*                    Results from last 7 days  Lab Units 05/10/18  0541 18  0435 18  0546 18  1032   INR  2 13* 2 07* 1 82* 1 80*       Lipid Profile:   No results found for: CHOL  No results found for: HDL  No results found for: LDLCALC  No results found for: TRIG    Cardiac testing:  Results for orders placed during the hospital encounter of 12/10/17   Echo complete with contrast if indicated    Narrative [address] [address] [phone]    Transthoracic Echocardiogram  2D, M-mode, Doppler, and Color Doppler    Study date:  11-Dec-2017    Patient: Sammie Regan  MR number: [id number]  Account number: [de-identified]  : 1945  Age: 71 years  Gender: Female  Status: Inpatient  Location: Bedside  Height: 60 in  Weight: 192 lb  BP: 99/ 50 mmHg    Indications: Shortness of breath      Diagnoses: R06 00 - Dyspnea, unspecified, R06 02 - Shortness of breath    Sonographer:  April [facility] Eileen Burgess  Interpreting Physician:  Inocencia Acosta MD  Referring Physician:  Kandis Wilson PA-C  Group:  [facility]    SUMMARY    LEFT VENTRICLE:  Systolic function was normal  Ejection fraction was estimated in the range of 55 % to 65 %  There were no regional wall motion abnormalities  There was mild concentric hypertrophy  Doppler parameters were consistent with abnormal left ventricular relaxation (grade 1 diastolic dysfunction)  MITRAL VALVE:  There was mild annular calcification  There was mild regurgitation  AORTIC VALVE:  There was mild regurgitation  TRICUSPID VALVE:  There was mild regurgitation  PULMONIC VALVE:  There was mild regurgitation  HISTORY: PRIOR HISTORY: Elevated trponin, Dementia, Seizures, Stroke, Myocardial infarction  Congestive heart failure  Risk factors: hypertension and hypercholesterolemia  Chronic lung disease  PROCEDURE: The procedure was performed at the bedside  This was a routine study  The transthoracic approach was used  The study included complete 2D imaging, M-mode, complete spectral Doppler, and color Doppler  The heart rate was 68 bpm,  at the start of the study  Images were obtained from the parasternal, apical, subcostal, and suprasternal notch acoustic windows  Echocardiographic views were limited due to poor acoustic window availability, decreased penetration, and  lung interference  This was a technically difficult study  LEFT VENTRICLE: Size was normal  Systolic function was normal  Ejection fraction was estimated in the range of 55 % to 65 %  There were no regional wall motion abnormalities  Wall thickness was normal  There was mild concentric  hypertrophy  DOPPLER: Doppler parameters were consistent with abnormal left ventricular relaxation (grade 1 diastolic dysfunction)      RIGHT VENTRICLE: The size was normal  Systolic function was normal  Wall thickness was normal     LEFT ATRIUM: Size was normal     RIGHT ATRIUM: Size was normal     MITRAL VALVE: There was mild annular calcification  Valve structure was normal  There was normal leaflet separation  DOPPLER: The transmitral velocity was within the normal range  There was no evidence for stenosis  There was mild  regurgitation  AORTIC VALVE: The valve was trileaflet  Leaflets exhibited normal thickness and normal cuspal separation  DOPPLER: Transaortic velocity was within the normal range  There was no evidence for stenosis  There was mild regurgitation  TRICUSPID VALVE: The valve structure was normal  There was normal leaflet separation  DOPPLER: The transtricuspid velocity was within the normal range  There was no evidence for stenosis  There was mild regurgitation  PULMONIC VALVE: Leaflets exhibited normal thickness, no calcification, and normal cuspal separation  DOPPLER: The transpulmonic velocity was within the normal range  There was mild regurgitation  PERICARDIUM: There was no pericardial effusion  The pericardium was normal in appearance  AORTA: The root exhibited normal size      SYSTEM MEASUREMENT TABLES    2D  %FS: 29 2 %  Ao Diam: 3 1 cm  EDV(Teich): 140 8 ml  EF(Teich): 55 6 %  ESV(Teich): 62 5 ml  IVSd: 1 1 cm  LA Area: 20 9 cm2  LA Diam: 3 7 cm  LVEDV MOD A4C: 117 1 ml  LVEF MOD A4C: 50 3 %  LVESV MOD A4C: 58 2 ml  LVIDd: 5 4 cm  LVIDs: 3 8 cm  LVLd A4C: 8 5 cm  LVLs A4C: 7 1 cm  LVPWd: 1 1 cm  RA Area: 17 5 cm2  RVIDd: 3 2 cm  SV MOD A4C: 59 ml  SV(Teich): 78 3 ml    CW  AR Dec [place]: 1 9 m/s2  AR Dec Time: 2208 1 ms  AR PHT: 640 4 ms  AR Vmax: 4 1 m/s  AR maxP 4 mmHg  TR Vmax: 2 5 m/s  TR maxP mmHg    MM  TAPSE: 1 4 cm    PW  AVC: 389 7 ms  E': 0 1 m/s  E/E': 15 3  MV A Anoop: 1 m/s  MV Dec [place]: 3 4 m/s2  MV DecT: 240 3 ms  MV E Anoop: 0 8 m/s  MV E/A Ratio: 0 8  MV PHT: 69 7 ms  MVA By PHT: 3 2 cm2    [facility] Accredited Echocardiography Laboratory    Prepared and electronically signed by    Meena Temple MD  Signed 11-Dec-2017 12:09:00           Meds/Allergies   all current active meds have been reviewed and current meds:   Current Facility-Administered Medications   Medication Dose Route Frequency    acetaminophen (TYLENOL) tablet 488 mg  488 mg Oral Q6H PRN    albuterol (PROVENTIL HFA,VENTOLIN HFA) inhaler 2 puff  2 puff Inhalation Q6H PRN    albuterol inhalation solution 2 5 mg  2 5 mg Nebulization Q6H PRN    aspirin (ECOTRIN LOW STRENGTH) EC tablet 81 mg  81 mg Oral Daily    atorvastatin (LIPITOR) tablet 10 mg  10 mg Oral Daily    buPROPion (WELLBUTRIN SR) 12 hr tablet 150 mg  150 mg Oral Daily    carBAMazepine (TEGretol) chewable tablet 100 mg  100 mg Oral TID    diltiazem (CARDIZEM SR) 12 hr capsule 180 mg  180 mg Oral Q12H [facility]    divalproex sodium (DEPAKOTE ER) 24 hr tablet 250 mg  250 mg Oral Daily    ferrous sulfate tablet 325 mg  325 mg Oral BID    fluticasone-salmeterol (ADVAIR) 100-50 mcg/dose inhaler 1 puff  1 puff Inhalation Q12H [facility]    furosemide (LASIX) tablet 60 mg  60 mg Oral Daily    heparin (porcine) subcutaneous injection 5,000 Units  5,000 Units Subcutaneous Q8H [facility]    levothyroxine tablet 88 mcg  88 mcg Oral Early Morning    metoprolol (LOPRESSOR) injection 5 mg  5 mg Intravenous Q6H PRN    metoprolol tartrate (LOPRESSOR) tablet 75 mg  75 mg Oral Q12H [facility]    nitroglycerin (NITROSTAT) SL tablet 0 4 mg  0 4 mg Sublingual Q5 Min PRN    potassium chloride (K-DUR,KLOR-CON) CR tablet 20 mEq  20 mEq Oral Daily    senna-docusate sodium (SENOKOT S) 8 6-50 mg per tablet 1 tablet  1 tablet Oral HS    warfarin (COUMADIN) tablet 2 5 mg  2 5 mg Oral Daily (warfarin)     Prescriptions Prior to Admission   Medication    acetaminophen (TYLENOL) 500 mg tablet    albuterol (2 5 mg/3 mL) 0 083 % nebulizer solution    albuterol (PROVENTIL HFA,VENTOLIN HFA) 90 mcg/act inhaler    aspirin (ECOTRIN LOW STRENGTH) 81 mg EC tablet    atorvastatin (LIPITOR) 10 mg tablet    buPROPion (WELLBUTRIN SR) 150 mg 12 hr tablet    carBAMazepine (TEGretol) 100 mg chewable tablet    Cholecalciferol (VITAMIN D) 2000 units CAPS    diltiazem (CARDIZEM SR) 120 mg 12 hr capsule    divalproex sodium (DEPAKOTE ER) 250 mg 24 hr tablet    fenofibrate micronized (LOFIBRA) 67 MG capsule    ferrous sulfate 325 (65 Fe) mg tablet    fluticasone furoate-vilanterol (BREO ELLIPTA)    furosemide (LASIX) 40 mg tablet    Levothyroxine Sodium 88 MCG CAPS    metoprolol tartrate (LOPRESSOR) 50 mg tablet    potassium chloride (K-DUR,KLOR-CON) 20 mEq tablet    Sennosides-Docusate Sodium (SENNA PLUS PO)    warfarin (COUMADIN) 2 mg tablet          ASSESSMENT & PLAN   1   Atrial flutter with RVR:  HR controlled  Will switch lopressor to 75 mg BID  Continue Cardizem  Continue Coumadin for anticoagulation      2   Chronic diastolic Congestive heart failure:  -BNP 3327  CXR shows stable cardiomegaly  -[facility] December 2017 shows EF 55-65%, no regional wall motion abnormalities, grade 1 diastolic dysfunction  -currently euvolemic, Not currently in exacerbation   -Continue lasix at present dose   -continue Lopressor   -low-salt diet, daily weights, I/O      3   CAD S/P PCI:  No anginal symptoms at this time   Continue aspirin, statin, Lopressor      4   Hypertension:  /84  Continue Cardizem  Lopressor dose adjusted as above       5   Hyperlipidemia:  Continue statin  Beverly Christianson PA-C  5/10/2018,10:56 AM    Portions of the record may have been created with voice recognition software   Occasional wrong word or "sound a like" substitutions may have occurred due to the inherent limitations of voice recognition software   Read the chart carefully and recognize, using context, where substitutions have occurred

## 2018-05-10 NOTE — PLAN OF CARE
Problem: DISCHARGE PLANNING - CARE MANAGEMENT  Goal: Discharge to post-acute care or home with appropriate resources  INTERVENTIONS:  - Conduct assessment to determine patient/family and health care team treatment goals, and need for post-acute services based on payer coverage, community resources, and patient preferences, and barriers to discharge  - Address psychosocial, clinical, and financial barriers to discharge as identified in assessment in conjunction with the patient/family and health care team  - Arrange appropriate level of post-acute services according to patients   needs and preference and payer coverage in collaboration with the physician and health care team  - Communicate with and update the patient/family, physician, and health care team regarding progress on the discharge plan  - Arrange appropriate transportation to post-acute venues   Outcome: Gabriel Slater from [facility] evaluated pt today for return and felt she would do better with STR at [facility] before returning  Cm will pend referral to [facility] and keep pt and daughter informed of decision  Daughter and [facility] are in agreement with this assessment

## 2018-05-10 NOTE — SOCIAL WORK
Filiberto from [place] evaluated pt today for return and felt she would do better with STR at [facility] before returning  Cm will pend referral to [facility] and keep pt and daughter informed of decision  Daughter and Ty Mcintyre are in agreement with this assessment

## 2018-05-10 NOTE — HOME QUALIFYING OXYGEN TEST
Baseline SPO2 on RA - 87%    SPO2 on 2 5L @ rest - 93%    SPO2 during exercise on RA - 85%    SPO2 on 2L while walking (160 ft) - 91%    Note:  Pt stated she was exhausted when we returned to the room  Supplemental home O2 is indicated

## 2018-05-11 LAB
ANION GAP SERPL CALCULATED.3IONS-SCNC: 7 MMOL/L (ref 4–13)
BUN SERPL-MCNC: 24 MG/DL (ref 5–25)
CALCIUM SERPL-MCNC: 9.5 MG/DL (ref 8.3–10.1)
CHLORIDE SERPL-SCNC: 104 MMOL/L (ref 100–108)
CO2 SERPL-SCNC: 31 MMOL/L (ref 21–32)
CREAT SERPL-MCNC: 1.51 MG/DL (ref 0.6–1.3)
GFR SERPL CREATININE-BSD FRML MDRD: 35 ML/MIN/1.73SQ M
GLUCOSE SERPL-MCNC: 126 MG/DL (ref 65–140)
INR PPP: 1.84 (ref 0.86–1.16)
POTASSIUM SERPL-SCNC: 3.6 MMOL/L (ref 3.5–5.3)
PROTHROMBIN TIME: 21.8 SECONDS (ref 12.1–14.4)
SODIUM SERPL-SCNC: 142 MMOL/L (ref 136–145)

## 2018-05-11 PROCEDURE — 80048 BASIC METABOLIC PNL TOTAL CA: CPT | Performed by: INTERNAL MEDICINE

## 2018-05-11 PROCEDURE — 99232 SBSQ HOSP IP/OBS MODERATE 35: CPT | Performed by: INTERNAL MEDICINE

## 2018-05-11 PROCEDURE — 85610 PROTHROMBIN TIME: CPT | Performed by: INTERNAL MEDICINE

## 2018-05-11 RX ORDER — WARFARIN SODIUM 5 MG/1
5 TABLET ORAL
Status: DISCONTINUED | OUTPATIENT
Start: 2018-05-11 | End: 2018-05-12 | Stop reason: HOSPADM

## 2018-05-11 RX ADMIN — Medication 325 MG: at 17:24

## 2018-05-11 RX ADMIN — ASPIRIN 81 MG: 81 TABLET, COATED ORAL at 09:26

## 2018-05-11 RX ADMIN — FLUTICASONE PROPIONATE AND SALMETEROL 1 PUFF: 50; 100 POWDER RESPIRATORY (INHALATION) at 09:28

## 2018-05-11 RX ADMIN — ENOXAPARIN SODIUM 90 MG: 100 INJECTION SUBCUTANEOUS at 09:25

## 2018-05-11 RX ADMIN — FLUTICASONE PROPIONATE AND SALMETEROL 1 PUFF: 50; 100 POWDER RESPIRATORY (INHALATION) at 21:42

## 2018-05-11 RX ADMIN — POTASSIUM CHLORIDE 20 MEQ: 1500 TABLET, EXTENDED RELEASE ORAL at 09:27

## 2018-05-11 RX ADMIN — CARBAMAZEPINE 100 MG: 100 TABLET, CHEWABLE ORAL at 16:18

## 2018-05-11 RX ADMIN — WARFARIN SODIUM 5 MG: 5 TABLET ORAL at 17:24

## 2018-05-11 RX ADMIN — FUROSEMIDE 60 MG: 40 TABLET ORAL at 09:27

## 2018-05-11 RX ADMIN — BUPROPION HYDROCHLORIDE 150 MG: 150 TABLET, FILM COATED, EXTENDED RELEASE ORAL at 09:25

## 2018-05-11 RX ADMIN — HEPARIN SODIUM 5000 UNITS: 5000 INJECTION, SOLUTION INTRAVENOUS; SUBCUTANEOUS at 05:06

## 2018-05-11 RX ADMIN — DILTIAZEM HYDROCHLORIDE 180 MG: 90 CAPSULE, EXTENDED RELEASE ORAL at 09:28

## 2018-05-11 RX ADMIN — STANDARDIZED SENNA CONCENTRATE AND DOCUSATE SODIUM 1 TABLET: 8.6; 5 TABLET, FILM COATED ORAL at 21:38

## 2018-05-11 RX ADMIN — METOPROLOL TARTRATE 75 MG: 50 TABLET ORAL at 21:38

## 2018-05-11 RX ADMIN — Medication 325 MG: at 09:27

## 2018-05-11 RX ADMIN — LEVOTHYROXINE SODIUM 88 MCG: 88 TABLET ORAL at 05:06

## 2018-05-11 RX ADMIN — ATORVASTATIN CALCIUM 10 MG: 10 TABLET, FILM COATED ORAL at 09:27

## 2018-05-11 RX ADMIN — DIVALPROEX SODIUM 250 MG: 250 TABLET, FILM COATED, EXTENDED RELEASE ORAL at 09:28

## 2018-05-11 RX ADMIN — CARBAMAZEPINE 100 MG: 100 TABLET, CHEWABLE ORAL at 09:26

## 2018-05-11 RX ADMIN — DILTIAZEM HYDROCHLORIDE 180 MG: 90 CAPSULE, EXTENDED RELEASE ORAL at 21:39

## 2018-05-11 RX ADMIN — CARBAMAZEPINE 100 MG: 100 TABLET, CHEWABLE ORAL at 21:38

## 2018-05-11 NOTE — SOCIAL WORK
Patient will betransported to [facility] 11:00AM 5/12/18 by [facility]  Facility aware, Daughter and patient aware  Nurse aware  Transportation Paper completed  IMM done

## 2018-05-11 NOTE — ASSESSMENT & PLAN NOTE
· BNP BMP is greater than 3000 however this is less than 1 was in April patient is not showing clinical signs of volume overload  · Chest x-ray is not showing evidence of vascular congestion  · Acute on chronic exacerbation of edema last evening while in the bathroom off oxygen  Lasix given and uptitrated by cardiology this am  Appears improved this am    · Lasix will be maintained at 60 mg daily  , Lopressor will be 75 mg b i d  and Cardizem was added at 180 mg b i d  consideration for increasing Lasix to 80 mg will be for future evaluation if creatinine is stable

## 2018-05-11 NOTE — ASSESSMENT & PLAN NOTE
· Supportive measures  Gets private home came to evaluate  Patient would do well to be transition to skilled nursing for brief period of rehabilitation prior to going Active the personal care home    Continue Wellbutrin

## 2018-05-11 NOTE — CASE MANAGEMENT
Continued Stay Review    Date: 5/11    Vital Signs: /78 (BP Location: Left arm)   Pulse 61   Temp 98 4 °F (36 9 °C) (Oral)   Resp 20   Ht 5' 2" (1 575 m)   Wt 86 3 kg (190 lb 4 1 oz)   SpO2 93%   BMI 34 80 kg/m²     Medications:   Scheduled Meds:   Current Facility-Administered Medications:  acetaminophen 488 mg Oral Q6H PRN Lucendia Bookbinder, CRNP   albuterol 2 puff Inhalation Q6H PRN Lucendia Bookbinder, CRNP   albuterol 2 5 mg Nebulization Q6H PRN Lucendia Bookbinder, CRNP   aspirin 81 mg Oral Daily Lucendia Bookbinder, CRNP   atorvastatin 10 mg Oral Daily Lucendia Bookbinder, CRNP   buPROPion 150 mg Oral Daily Lucendia Bookbinder, CRNP   carBAMazepine 100 mg Oral TID Lucendia Bookbinder, CRNP   diltiazem 180 mg Oral Q12H [facility] Lonza FELIZ Arguello   divalproex sodium 250 mg Oral Daily Lucendia Bookbinder, CRNP   enoxaparin 1 mg/kg Subcutaneous Q24H [facility] Sofia Jordan MD   ferrous sulfate 325 mg Oral BID Lucendia Bookbinder, CRNP   fluticasone-salmeterol 1 puff Inhalation Q12H [facility] Lucendia Bookbinder, CRNP   furosemide 60 mg Oral Daily Evelyne Levine MD   levothyroxine 88 mcg Oral Early Morning Lucendia Bookbinder, CRNP   metoprolol 5 mg Intravenous Q6H PRN Lucendia Bookbinder, CRNP   metoprolol tartrate 75 mg Oral Q12H [facility] Lonza FELIZ Arguello   nitroglycerin 0 4 mg Sublingual Q5 Min PRN Hiram Arreola PA-C   potassium chloride 20 mEq Oral Daily Lucendia Bookbinder, CRNP   senna-docusate sodium 1 tablet Oral HS Lucendia Bookbinder, CRNP   warfarin 5 mg Oral Daily (warfarin) Sofia Jordan MD     Continuous Infusions:    PRN Meds:   acetaminophen    albuterol    metoprolol    nitroglycerin    Abnormal Labs/Diagnostic Results: BUN creat 24  1 51  PT INR   21 8  1 84    Age/Sex: 71 y o  female     Assessment/Plan: Note from  5/10  COPD (chronic obstructive pulmonary disease) (HCC)   Assessment & Plan     · Does not appear in acute exacerbation  · Patient did have mild hypoxia in the ED this overall improved with oxygen  · Respiratory protocol  · Continue p r n  nebulizers          HTN (hypertension)   Assessment & Plan     · Blood pressure soft  · Continue current regimen with parameters          * Atrial fibrillation (HCC)   Assessment & Plan     · Known history with recent diagnosis patient is currently showing on ECG and telemetry atrial flutter with RVR   · Patient presented with some intermittent RVR in the ED  · Continue home dose of Cardizem and metoprolol  · Will add p r n  metoprolol 5 mg for sustained rate greater than 120  · Patient is on Coumadin her INR improving   · Telemetry monitoring: rate uncontrolled afib noted  · Cardiology consult:appreciated recommendations  · Patient is known to Dr Elder Cho there is concern sleep apnea could be contributing to patient's arrhythmia  Discontinue continuous pulse ox  Outpatient sleep study recommended          Chronic diastolic congestive heart failure (HCC)   Assessment & Plan     · BNP BMP is greater than 3000 however this is less than 1 was in April patient is not showing clinical signs of volume overload  · Chest x-ray is not showing evidence of vascular congestion  · Acute on chronic exacerbation of edema last evening while in the bathroom off oxygen  Lasix given and uptitrated by cardiology this am  Appears improved this am    · Lasix will be maintained at 60 mg daily  , Lopressor will be 75 mg b i d  and Cardizem was added at 180 mg b i d  consideration for increasing Lasix to 80 mg will be for future evaluation if creatinine is stable           Ambulatory dysfunction   Assessment & Plan     · Out of bed with assistance, observed patient ambulating in joshua with assistance, did well  · Bed alarm for safety   · PT/OT working with patient  Plan for transition to short-term rehab prior to going back to the personal care home because she will have to do steps in order to get to her bedroom          Dementia   Assessment & Plan     · Supportive measures    Gets private home came to evaluate  Patient would do well to be transition to skilled nursing for brief period of rehabilitation prior to going Active the personal care home  Continue Wellbutrin          Seizure disorder [facility])   Assessment & Plan     · Seizure precautions  · Continue Tegretol and Depakote          Acute on chronic respiratory failure ([facility] [address] )   Assessment & Plan     Resolved  Patient had an episode of rapid response where she was excessively short of breath and hypoxic  It appears the patient may have had flash pulmonary edema which resolved after additional Lasix was given  Cardiology's titrating rate control medications which may improve her exertional dyspnea related to her atrial fibrillation           Stage 3 chronic kidney disease   Assessment & Plan     · CKD stage 3 with a baseline creatinine of 1 5  · Patient appears to have an acute kidney injury on CKD  · Patient was given a gentle bolus of 500 cc in the emergency department  · Lasix being titrated  · Patient has single kidney with chronic indwelling baca catheter, due to be changed on Friday per daughter  · Will check a BMP            VTE Pharmacologic Prophylaxis:   Pharmacologic: Heparin  Mechanical: Mechanical VTE prophylaxis in place    Patient Centered Rounds: I have performed bedside rounds with nursing staff today  Discussions with Specialists or Other Care Team Provider:  Cardiology  Education and Discussions with Family / Patient: Will update daughter  Time Spent for Care: 30 minutes    More than 50% of total time spent on counseling and coordination of care as described above    Current Length of Stay: 4 day(s)  Current Patient Status: Inpatient   Certification Statement: The patient will continue to require additional inpatient hospital stay due to Titrating medications for AFib and diuresis, patient will need evaluation for placement and sniff   Discharge Plan:  Bertin did not come yesterday will come today  code Status: Level 3 - DNAR and DNI       Cardiology note  5/11  1   Atrial flutter with RVR:  HR controlled  Continue Cardizem and Lopressor for anticoagulation  BP soft today  May consider low-dose digoxin in the future if heart rates become elevated again  Continue Coumadin for anticoagulation    2   Chronic diastolic Congestive heart failure:  -BNP 3327  CXR shows stable cardiomegaly  -[facility] December 2017 shows EF 55-65%, no regional wall motion abnormalities, grade 1 diastolic dysfunction  -currently euvolemic, Not currently in exacerbation   -Continue lasix at present dose   -continue Lopressor   -low-salt diet, daily weights, I/O    3   CAD S/P PCI:  No anginal symptoms at this time   Continue aspirin, statin, Lopressor    4   Hypertension:  BP 99/70   Continue Cardizem and Lopressor within parameters    5   Hyperlipidemia:  Continue statin

## 2018-05-11 NOTE — ASSESSMENT & PLAN NOTE
· CKD stage 3 with a baseline creatinine of 1 5  · Patient appears to have an acute kidney injury on CKD  · Patient was given a gentle bolus of 500 cc in the emergency department  · Lasix being titrated  · Patient has single kidney with chronic indwelling baca catheter, due to be changed on Friday per daughter  · Will check a BMP in 2 days

## 2018-05-11 NOTE — PLAN OF CARE
Problem: DISCHARGE PLANNING - CARE MANAGEMENT  Goal: Discharge to post-acute care or home with appropriate resources  INTERVENTIONS:  - Conduct assessment to determine patient/family and health care team treatment goals, and need for post-acute services based on payer coverage, community resources, and patient preferences, and barriers to discharge  - Address psychosocial, clinical, and financial barriers to discharge as identified in assessment in conjunction with the patient/family and health care team  - Arrange appropriate level of post-acute services according to patients   needs and preference and payer coverage in collaboration with the physician and health care team  - Communicate with and update the patient/family, physician, and health care team regarding progress on the discharge plan  - Arrange appropriate transportation to post-acute venues   Outcome: Completed Date Met: 05/11/18  Patient will betransported to [address] [place] [address] 11:00AM 5/12/18 by Cleo Brown [facility]  Facility aware, Daughter and patient aware  Nurse aware  Transportation Paper completed  IMM done

## 2018-05-11 NOTE — SOCIAL WORK
[facility] has not accepted patient so daughter suggested [facility] first and then  [address] [place] [address] second  Referrals sent

## 2018-05-11 NOTE — PROGRESS NOTES
General Cardiology   Progress Note   Ekta Diane 71 y o  female MRN: [id number]  Unit/Bed#: -01 Encounter: [id number]      SUBJECTIVE:   No significant events overnight  BP soft today  Patient asymptomatic  OBJECTIVE:   Vitals:  Vitals:    05/11/18 0700   BP: 99/70   Pulse: 70   Resp: 18   Temp: 98 9 °F (37 2 °C)   SpO2: 93%     Body mass index is 34 8 kg/m²  Systolic (69UOU), AVQ:053 , Min:99 , AZZ:254     Diastolic (39EGW), MCE:92, Min:70, Max:84      Intake/Output Summary (Last 24 hours) at 05/11/18 0912  Last data filed at 05/11/18 0500   Gross per 24 hour   Intake             1800 ml   Output             1200 ml   Net              600 ml     Weight (last 2 days)     Date/Time   Weight    05/11/18 0600  86 3 (190 26)    05/10/18 0550  85 2 (187 83)    05/09/18 0550  85 (187 39)              PHYSICAL EXAMS:  General:  Patient is not in acute distress, laying in the bed comfortably, awake, alert responding to commands  Head: Normocephalic, Atraumatic  HEENT:  Both pupils normal-size atraumatic, normocephalic, nonicteric  Neck:  JVP not raised  Trachea central  Respiratory:  Bronchovascular breathing all over the chest without any accompaniment  Cardiovascular:  +irregular  No murmur, rub, gallops  GI:  Abdomen soft nontender   Liver and spleen normal size  Lymphatic:  No cervical or inguinal lymphadenopathy  Neurologic:  Patient is awake alert, responding to command, well-oriented to time and place and person moving     LABORATORY RESULTS:    Results from last 7 days  Lab Units 05/07/18  1032   TROPONIN I ng/mL <0 02       CBC with diff:   Results from last 7 days  Lab Units 05/09/18  0525 05/08/18  0546 05/07/18  1032   WBC Thousand/uL 5 48 6 49 5 63   HEMOGLOBIN g/dL 11 3* 11 4* 11 3*   HEMATOCRIT % 35 1 35 4 35 2   MCV fL 103* 104* 105*   PLATELETS Thousands/uL 150 153 163   MCH pg 33 0 33 3 33 6   MCHC g/dL 32 2 32 2 32 1   RDW % 12 7 13 1 12 9   MPV fL 11 2 11 8 11 9   NRBC AUTO /100 WBCs  -- --  0       CMP:  Results from last 7 days  Lab Units 18  0525 18  0546 18  1032   SODIUM mmol/L 142 144 146*   POTASSIUM mmol/L 3 7 4 2 3 6   CHLORIDE mmol/L 105 107 108   CO2 mmol/L 28 29 32   ANION GAP mmol/L 9 8 6   BUN mg/dL 30* 34* 32*   CREATININE mg/dL 1 61* 1 86* 1 95*   GLUCOSE RANDOM mg/dL 129 111 187*   CALCIUM mg/dL 9 0 9 3 9 2   AST U/L  --   --  25   ALT U/L  --   --  34   ALK PHOS U/L  --   --  51   TOTAL PROTEIN g/dL  --   --  7 1   BILIRUBIN TOTAL mg/dL  --   --  0 50   EGFR ml/min/1 73sq m 32 27 25       BMP:  Results from last 7 days  Lab Units 18  0525 18  0546 18  1032   SODIUM mmol/L 142 144 146*   POTASSIUM mmol/L 3 7 4 2 3 6   CHLORIDE mmol/L 105 107 108   CO2 mmol/L 28 29 32   BUN mg/dL 30* 34* 32*   CREATININE mg/dL 1 61* 1 86* 1 95*   GLUCOSE RANDOM mg/dL 129 111 187*   CALCIUM mg/dL 9 0 9 3 9 2         Results from last 7 days  Lab Units 18  1032   NT-PRO BNP pg/mL 3,327*                    Results from last 7 days  Lab Units 18  0506 05/10/18  0541 18  0435 18  0546 18  1032   INR  1 84* 2 13* 2 07* 1 82* 1 80*       Lipid Profile:   No results found for: CHOL  No results found for: HDL  No results found for: LDLCALC  No results found for: TRIG    Cardiac testing:  Results for orders placed during the hospital encounter of 12/10/17   Echo complete with contrast if indicated    Narrative [address] [place] [address] [phone]    Transthoracic Echocardiogram  2D, M-mode, Doppler, and Color Doppler    Study date:  11-Dec-2017    Patient: Nimesh Signs  MR number: [id number]  Account number: [de-identified]  : 1945  Age: 71 years  Gender: Female  Status: Inpatient  Location: Bedside  Height: 60 in  Weight: 192 lb  BP: 99/ 50 mmHg    Indications: Shortness of breath      Diagnoses: R06 00 - Dyspnea, unspecified, R06 02 - Shortness of breath    Sonographer: Susan Manshahida [place]  Interpreting Physician:  Amy Vásquez MD  Referring Physician:  Fawad Muro PA-C  Group:  [facility]    SUMMARY    LEFT VENTRICLE:  Systolic function was normal  Ejection fraction was estimated in the range of 55 % to 65 %  There were no regional wall motion abnormalities  There was mild concentric hypertrophy  Doppler parameters were consistent with abnormal left ventricular relaxation (grade 1 diastolic dysfunction)  MITRAL VALVE:  There was mild annular calcification  There was mild regurgitation  AORTIC VALVE:  There was mild regurgitation  TRICUSPID VALVE:  There was mild regurgitation  PULMONIC VALVE:  There was mild regurgitation  HISTORY: PRIOR HISTORY: Elevated trponin, Dementia, Seizures, Stroke, Myocardial infarction  Congestive heart failure  Risk factors: hypertension and hypercholesterolemia  Chronic lung disease  PROCEDURE: The procedure was performed at the bedside  This was a routine study  The transthoracic approach was used  The study included complete 2D imaging, M-mode, complete spectral Doppler, and color Doppler  The heart rate was 68 bpm,  at the start of the study  Images were obtained from the parasternal, apical, subcostal, and suprasternal notch acoustic windows  Echocardiographic views were limited due to poor acoustic window availability, decreased penetration, and  lung interference  This was a technically difficult study  LEFT VENTRICLE: Size was normal  Systolic function was normal  Ejection fraction was estimated in the range of 55 % to 65 %  There were no regional wall motion abnormalities  Wall thickness was normal  There was mild concentric  hypertrophy  DOPPLER: Doppler parameters were consistent with abnormal left ventricular relaxation (grade 1 diastolic dysfunction)      RIGHT VENTRICLE: The size was normal  Systolic function was normal  Wall thickness was normal     LEFT ATRIUM: Size was normal     RIGHT ATRIUM: Size was normal     MITRAL VALVE: There was mild annular calcification  Valve structure was normal  There was normal leaflet separation  DOPPLER: The transmitral velocity was within the normal range  There was no evidence for stenosis  There was mild  regurgitation  AORTIC VALVE: The valve was trileaflet  Leaflets exhibited normal thickness and normal cuspal separation  DOPPLER: Transaortic velocity was within the normal range  There was no evidence for stenosis  There was mild regurgitation  TRICUSPID VALVE: The valve structure was normal  There was normal leaflet separation  DOPPLER: The transtricuspid velocity was within the normal range  There was no evidence for stenosis  There was mild regurgitation  PULMONIC VALVE: Leaflets exhibited normal thickness, no calcification, and normal cuspal separation  DOPPLER: The transpulmonic velocity was within the normal range  There was mild regurgitation  PERICARDIUM: There was no pericardial effusion  The pericardium was normal in appearance  AORTA: The root exhibited normal size      SYSTEM MEASUREMENT TABLES    2D  %FS: 29 2 %  Ao Diam: 3 1 cm  EDV(Teich): 140 8 ml  EF(Teich): 55 6 %  ESV(Teich): 62 5 ml  IVSd: 1 1 cm  LA Area: 20 9 cm2  LA Diam: 3 7 cm  LVEDV MOD A4C: 117 1 ml  LVEF MOD A4C: 50 3 %  LVESV MOD A4C: 58 2 ml  LVIDd: 5 4 cm  LVIDs: 3 8 cm  LVLd A4C: 8 5 cm  LVLs A4C: 7 1 cm  LVPWd: 1 1 cm  RA Area: 17 5 cm2  RVIDd: 3 2 cm  SV MOD A4C: 59 ml  SV(Teich): 78 3 ml    CW  AR Dec [place]: 1 9 m/s2  AR Dec Time: 2208 1 ms  AR PHT: 640 4 ms  AR Vmax: 4 1 m/s  AR maxP 4 mmHg  TR Vmax: 2 5 m/s  TR maxP mmHg    MM  TAPSE: 1 4 cm    PW  AVC: 389 7 ms  E': 0 1 m/s  E/E': 15 3  MV A Anoop: 1 m/s  MV Dec [place]: 3 4 m/s2  MV DecT: 240 3 ms  MV E Anoop: 0 8 m/s  MV E/A Ratio: 0 8  MV PHT: 69 7 ms  MVA By PHT: 3 2 cm2    [facility]    Prepared and electronically signed by    Kalyan Loera MD  Signed 11-Dec-2017 12:09:00         Meds/Allergies   all current active meds have been reviewed and current meds:   Current Facility-Administered Medications   Medication Dose Route Frequency    acetaminophen (TYLENOL) tablet 488 mg  488 mg Oral Q6H PRN    albuterol (PROVENTIL HFA,VENTOLIN HFA) inhaler 2 puff  2 puff Inhalation Q6H PRN    albuterol inhalation solution 2 5 mg  2 5 mg Nebulization Q6H PRN    aspirin (ECOTRIN LOW STRENGTH) EC tablet 81 mg  81 mg Oral Daily    atorvastatin (LIPITOR) tablet 10 mg  10 mg Oral Daily    buPROPion (WELLBUTRIN SR) 12 hr tablet 150 mg  150 mg Oral Daily    carBAMazepine (TEGretol) chewable tablet 100 mg  100 mg Oral TID    diltiazem (CARDIZEM SR) 12 hr capsule 180 mg  180 mg Oral Q12H TONI    divalproex sodium (DEPAKOTE ER) 24 hr tablet 250 mg  250 mg Oral Daily    enoxaparin (LOVENOX) subcutaneous injection 90 mg  1 mg/kg Subcutaneous Q24H [facility]    ferrous sulfate tablet 325 mg  325 mg Oral BID    fluticasone-salmeterol (ADVAIR) 100-50 mcg/dose inhaler 1 puff  1 puff Inhalation Q12H TONI    furosemide (LASIX) tablet 60 mg  60 mg Oral Daily    levothyroxine tablet 88 mcg  88 mcg Oral Early Morning    metoprolol (LOPRESSOR) injection 5 mg  5 mg Intravenous Q6H PRN    metoprolol tartrate (LOPRESSOR) tablet 75 mg  75 mg Oral Q12H [facility]    nitroglycerin (NITROSTAT) SL tablet 0 4 mg  0 4 mg Sublingual Q5 Min PRN    potassium chloride (K-DUR,KLOR-CON) CR tablet 20 mEq  20 mEq Oral Daily    senna-docusate sodium (SENOKOT S) 8 6-50 mg per tablet 1 tablet  1 tablet Oral HS    warfarin (COUMADIN) tablet 5 mg  5 mg Oral Daily (warfarin)     Prescriptions Prior to Admission   Medication    acetaminophen (TYLENOL) 500 mg tablet    albuterol (2 5 mg/3 mL) 0 083 % nebulizer solution    albuterol (PROVENTIL HFA,VENTOLIN HFA) 90 mcg/act inhaler    aspirin (ECOTRIN LOW STRENGTH) 81 mg EC tablet    atorvastatin (LIPITOR) 10 mg tablet    buPROPion [facility] SR) 150 mg 12 hr tablet    carBAMazepine (TEGretol) 100 mg chewable tablet    Cholecalciferol (VITAMIN D) 2000 units CAPS    diltiazem (CARDIZEM SR) 120 mg 12 hr capsule    divalproex sodium (DEPAKOTE ER) 250 mg 24 hr tablet    fenofibrate micronized (LOFIBRA) 67 MG capsule    ferrous sulfate 325 (65 Fe) mg tablet    fluticasone furoate-vilanterol (BREO ELLIPTA)    furosemide (LASIX) 40 mg tablet    Levothyroxine Sodium 88 MCG CAPS    metoprolol tartrate (LOPRESSOR) 50 mg tablet    potassium chloride (K-DUR,KLOR-CON) 20 mEq tablet    Sennosides-Docusate Sodium (SENNA PLUS PO)    warfarin (COUMADIN) 2 mg tablet          ASSESSMENT & PLAN   1   Atrial flutter with RVR:  HR controlled  Continue Cardizem and Lopressor for anticoagulation  BP soft today  May consider low-dose digoxin in the future if heart rates become elevated again  Continue Coumadin for anticoagulation      2   Chronic diastolic Congestive heart failure:  -BNP 3327  CXR shows stable cardiomegaly  -[facility] December 2017 shows EF 55-65%, no regional wall motion abnormalities, grade 1 diastolic dysfunction  -currently euvolemic, Not currently in exacerbation   -Continue lasix at present dose   -continue Lopressor   -low-salt diet, daily weights, I/O      3   CAD S/P PCI:  No anginal symptoms at this time   Continue aspirin, statin, Lopressor      4   Hypertension:  BP 99/70  Continue Cardizem and Lopressor within parameters      5   Hyperlipidemia:  Continue statin  Stable from cardiac standpoint  Will follow up with outpatient cardiologist Dr Chandni Hall PA-C  5/11/2018,9:12 AM    Portions of the record may have been created with voice recognition software   Occasional wrong word or "sound a like" substitutions may have occurred due to the inherent limitations of voice recognition software   Read the chart carefully and recognize, using context, where substitutions have occurred

## 2018-05-11 NOTE — ASSESSMENT & PLAN NOTE
Resolved  Patient had an episode of rapid response where she was excessively short of breath and hypoxic  It appears the patient may have had flash pulmonary edema which resolved after additional Lasix was given  Cardiology's titrating rate control medications which may improve her exertional dyspnea related to her atrial fibrillation

## 2018-05-11 NOTE — PROGRESS NOTES
Progress Note Lillian Garcia 1945, 71 y o  female MRN: [id number]    Unit/Bed#: -01 Encounter: [id number]    Primary Care Provider: Lady Guevara DO   Date and time admitted to hospital: 5/7/2018  9:59 AM        COPD (chronic obstructive pulmonary disease) ([facility] )   Assessment & Plan    · Does not appear in acute exacerbation  · Patient did have mild hypoxia in the ED this overall improved with oxygen  · Respiratory protocol  · Continue p r n  nebulizers        HTN (hypertension)   Assessment & Plan    · Blood pressure soft  · Continue current regimen with parameters        * Atrial fibrillation ([facility] )   Assessment & Plan    · Known history with recent diagnosis patient is currently showing on ECG and telemetry atrial flutter with RVR   · Patient presented with some intermittent RVR in the ED  · Continue home dose of Cardizem and metoprolol  · Will add p r n  metoprolol 5 mg for sustained rate greater than 120  · Patient is on Coumadin her INR improving   · Telemetry monitoring: rate uncontrolled afib noted  · Cardiology consult:appreciated recommendations  · Patient is known to Dr Dhiraj Crouch there is concern sleep apnea could be contributing to patient's arrhythmia  Discontinue continuous pulse ox  Outpatient sleep study recommended        Chronic diastolic congestive heart failure (HCC)   Assessment & Plan    · BNP BMP is greater than 3000 however this is less than 1 was in April patient is not showing clinical signs of volume overload  · Chest x-ray is not showing evidence of vascular congestion  · Acute on chronic exacerbation of edema last evening while in the bathroom off oxygen  Lasix given and uptitrated by cardiology this am  Appears improved this am    · Lasix will be maintained at 60 mg daily  , Lopressor will be 75 mg b i d  and Cardizem was added at 180 mg b i d  consideration for increasing Lasix to 80 mg will be for future evaluation if creatinine is stable          Ambulatory dysfunction   Assessment & Plan    · Out of bed with assistance, observed patient ambulating in joshua with assistance, did well  · Bed alarm for safety   · PT/OT working with patient  Plan for transition to short-term rehab prior to going back to the personal care home because she will have to do steps in order to get to her bedroom        Dementia   Assessment & Plan    · Supportive measures  Gets private home came to evaluate  Patient would do well to be transition to skilled nursing for brief period of rehabilitation prior to going Active the personal care home  Continue Wellbutrin        Seizure disorder (HCC)   Assessment & Plan    · Seizure precautions  · Continue Tegretol and Depakote        Acute on chronic respiratory failure [facility])   Assessment & Plan    Resolved  Patient had an episode of rapid response where she was excessively short of breath and hypoxic  It appears the patient may have had flash pulmonary edema which resolved after additional Lasix was given  Cardiology's titrating rate control medications which may improve her exertional dyspnea related to her atrial fibrillation  Stage 3 chronic kidney disease   Assessment & Plan    · CKD stage 3 with a baseline creatinine of 1 5  · Patient appears to have an acute kidney injury on CKD  · Patient was given a gentle bolus of 500 cc in the emergency department  · Lasix being titrated  · Patient has single kidney with chronic indwelling baca catheter, due to be changed on Friday per daughter  · Will check a BMP             VTE Pharmacologic Prophylaxis:   Pharmacologic: Heparin  Mechanical: Mechanical VTE prophylaxis in place  Patient Centered Rounds: I have performed bedside rounds with nursing staff today  Discussions with Specialists or Other Care Team Provider:  Cardiology  Education and Discussions with Family / Patient: Will update daughter  Time Spent for Care: 30 minutes    More than 50% of total time spent on counseling and coordination of care as described above  Current Length of Stay: 4 day(s)  Current Patient Status: Inpatient   Certification Statement: The patient will continue to require additional inpatient hospital stay due to Titrating medications for AFib and diuresis, patient will need evaluation for placement and sniff    Discharge Plan:  Misty Rasmussen did not come yesterday will come today  code Status: Level 3 - DNAR and DNI    Subjective:   Patient at baseline pleasant Mckenna confused    Objective:   Vitals:   Temp (24hrs), Av 4 °F (36 9 °C), Min:98 °F (36 7 °C), Max:98 9 °F (37 2 °C)    HR:  [67-97] 70  Resp:  [18] 18  BP: ()/(70-84) 99/70  SpO2:  [92 %-94 %] 93 %  Body mass index is 34 8 kg/m²  Input and Output Summary (last 24 hours):        Intake/Output Summary (Last 24 hours) at 18 0913  Last data filed at 18 0500   Gross per 24 hour   Intake             1800 ml   Output             1200 ml   Net              600 ml       Physical Exam:     Physical Exam  Generally well-developed obese female no acute distress she is pleasantly confused she is normocephalic atraumatic pupils equal round and reactive to light extraocular muscles intact mucous membranes are moist neck is supple there is no JVD no lymph nodes no carotid bruits chest is decreased but clear to auscultation is no rhonchi rales or wheezes  Cardiovascular regular rate rhythm positive S1 and S2 no S3-S4 murmur or gallop  Abdomen soft nontender nondistended with positive bowel sounds no hepatosplenomegaly no guarding or rebound  Extremities show some trace to 1 edema improving  Additional Data:   Labs:    Results from last 7 days  Lab Units 18  0525  18  1032   WBC Thousand/uL 5 48  < > 5 63   HEMOGLOBIN g/dL 11 3*  < > 11 3*   HEMATOCRIT % 35 1  < > 35 2   PLATELETS Thousands/uL 150  < > 163   NEUTROS PCT %  --   --  65   LYMPHS PCT %  --   --  18   MONOS PCT %  --   --  10   EOS PCT %  --   --  6   < > = values in this interval not displayed  Results from last 7 days  Lab Units 05/09/18  0525  05/07/18  1032   SODIUM mmol/L 142  < > 146*   POTASSIUM mmol/L 3 7  < > 3 6   CHLORIDE mmol/L 105  < > 108   CO2 mmol/L 28  < > 32   BUN mg/dL 30*  < > 32*   CREATININE mg/dL 1 61*  < > 1 95*   CALCIUM mg/dL 9 0  < > 9 2   TOTAL PROTEIN g/dL  --   --  7 1   BILIRUBIN TOTAL mg/dL  --   --  0 50   ALK PHOS U/L  --   --  51   ALT U/L  --   --  34   AST U/L  --   --  25   GLUCOSE RANDOM mg/dL 129  < > 187*   < > = values in this interval not displayed  Results from last 7 days  Lab Units 05/10/18  0506   INR  2 13       * I Have Reviewed All Lab Data Listed Above  * Additional Pertinent Lab Tests Reviewed: All Labs Within Last 24 Hours Reviewed    Imaging:    Imaging Reports Reviewed Today Include:  None    Cultures:   Blood Culture:   Lab Results   Component Value Date    BLOODCX No Growth After 5 Days  12/10/2017    BLOODCX No Growth After 5 Days   12/10/2017     Urine Culture:   Lab Results   Component Value Date    URINECX >100,000 cfu/ml Enterococcus faecalis (A) 03/22/2018    URINECX 40,000-49,000 cfu/ml Stenotrophomonas maltophilia (A) 03/22/2018    URINECX >100,000 cfu/ml Citrobacter freundii (A) 12/10/2017    URINECX 50,000-59,000 cfu/ml Providencia rettgeri (A) 12/10/2017     Sputum Culture: No components found for: SPUTUMCX  Wound Culture: No results found for: WOUNDCULT    Last 24 Hours Medication List:     Current Facility-Administered Medications:  acetaminophen 488 mg Oral Q6H PRN Ayaka Sender, CRNP   albuterol 2 puff Inhalation Q6H PRN Ayaka Sender, CRNP   albuterol 2 5 mg Nebulization Q6H PRN Ayaka Sender, CRNP   aspirin 81 mg Oral Daily Ayaka Sender, CRNP   atorvastatin 10 mg Oral Daily Ayaka Sender, CRNP   buPROPion 150 mg Oral Daily Ayaka Sender, CRNP   carBAMazepine 100 mg Oral TID Ayaka Sender, CRNP   diltiazem 180 mg Oral Q12H [facility] Fredo Stephens PA-C   divalproex sodium 250 mg Oral Daily Ayaka Sender, TANVI   enoxaparin 1 mg/kg Subcutaneous Q24H [facility] Geno Isaac MD   ferrous sulfate 325 mg Oral BID TANVI Miller   fluticasone-salmeterol 1 puff Inhalation Q12H [facility] TANVI Miller   furosemide 60 mg Oral Daily Daniel Holm MD   levothyroxine 88 mcg Oral Early Morning TANVI Miller   metoprolol 5 mg Intravenous Q6H PRN TANVI Miller   metoprolol tartrate 75 mg Oral Q12H [facility] Wang Payton PA-C   nitroglycerin 0 4 mg Sublingual Q5 Min PRN Sienna Fisher PA-C   potassium chloride 20 mEq Oral Daily TANVI Miller   senna-docusate sodium 1 tablet Oral HS TANVI Miller   warfarin 5 mg Oral Daily (warfarin) Geno Isaac MD        Today, Patient Was Seen By: Geno Isaac MD    ** Please Note: Dragon 360 Dictation voice to text software may have been used in the creation of this document   **

## 2018-05-11 NOTE — PROGRESS NOTES
Davis catheter replaced earlier in day today (5/10/18), no urine output was noted  Davis catheter was replaced with visible urine output from catheter  Patient resting comfortably in bed  No concerns at this time       Eloisa Rasmussen RN  05/10/18  11:04 PM

## 2018-05-11 NOTE — PROGRESS NOTES
Progress Note Enrique Medina 1945, 71 y o  female MRN: [id number]    Unit/Bed#: -01 Encounter: [id number]    Primary Care Provider: Josephine August DO   Date and time admitted to hospital: 5/7/2018  9:59 AM        COPD (chronic obstructive pulmonary disease) ([facility] )   Assessment & Plan    · Does not appear in acute exacerbation  · Patient did have mild hypoxia in the ED this overall improved with oxygen  · Respiratory protocol  · Continue p r n  nebulizers        HTN (hypertension)   Assessment & Plan    · Blood pressure soft  · Continue current regimen with parameters        * Atrial fibrillation ([facility] )   Assessment & Plan    · Known history with recent diagnosis patient is currently showing on ECG and telemetry atrial flutter with RVR   · Patient presented with some intermittent RVR in the ED  · Continue home dose of Cardizem and metoprolol  · Will add p r n  metoprolol 5 mg for sustained rate greater than 120  · Patient is on Coumadin her INR improving   · Telemetry monitoring: rate uncontrolled afib noted  · Cardiology consult:appreciated recommendations  · Patient is known to Dr Kim Query there is concern sleep apnea could be contributing to patient's arrhythmia  Titrating medications        Chronic diastolic congestive heart failure (HCC)   Assessment & Plan    · BNP BMP is greater than 3000 however this is less than 1 was in April patient is not showing clinical signs of volume overload  · Chest x-ray is not showing evidence of vascular congestion  · Acute on chronic exacerbation of edema last evening while in the bathroom off oxygen  Lasix given and uptitrated by cardiology this am  Appears improved this am    Lasix will be maintained at 60 mg daily  Titrating medications      Ambulatory dysfunction   Assessment & Plan    · Out of bed with assistance, observed patient ambulating in joshua with assistance, did well  · Bed alarm for safety   PT/OT working with patient    Personal care home director to come and evaluate patient for transition back to that level of care  I suspect patient will need a rehab stay  Dementia   Assessment & Plan    · Supportive measures  [address] came to evaluate  Patient would benefit from  skilled nursing for brief period of rehabilitation prior to going Active the personal care home  Continue Wellbutrin        Seizure disorder (HCC)   Assessment & Plan    · Seizure precautions  · Continue Tegretol and Depakote        Acute on chronic respiratory failure [facility])   Assessment & Plan    Resolved  Patient had an episode of rapid response where she was excessively short of breath and hypoxic  It appears the patient may have had flash pulmonary edema which resolved after additional Lasix was given  Cardiology's titrating rate control medications which may improve her exertional dyspnea related to her atrial fibrillation  Stage 3 chronic kidney disease   Assessment & Plan    · CKD stage 3 with a baseline creatinine of 1 5  · Patient appears to have an acute kidney injury on CKD  · Patient was given a gentle bolus of 500 cc in the emergency department  · Lasix being titrated  · Patient has single kidney with chronic indwelling baca catheter, due to be changed on Friday per daughter  · Will check a BMP in 2 days            VTE Pharmacologic Prophylaxis:   Pharmacologic: Heparin  Mechanical: Mechanical VTE prophylaxis in place  Patient Centered Rounds: I have performed bedside rounds with nursing staff today  Discussions with Specialists or Other Care Team Provider: cardiology  Education and Discussions with Family / Patient: will update daughter   Time Spent for Care: 30 minutes  More than 50% of total time spent on counseling and coordination of care as described above      Current Length of Stay: 2 day(s)  Current Patient Status: Inpatient   Certification Statement: The patient will continue to require additional inpatient hospital stay due to titration of medication for afib and heart failure and placement    Discharge Plan: when placement found  Code Status: Level 3 - DNAR and DNI    Subjective:   Patient pleasantly confused    Objective:   Vitals:   Temp (24hrs), Av 4 °F (36 9 °C), Min:98 °F (36 7 °C), Max:98 9 °F (37 2 °C)    HR:  [67-97] 70  Resp:  [18] 18  BP: ()/(70-84) 99/70  SpO2:  [92 %-94 %] 93 %  Body mass index is 34 8 kg/m²  Input and Output Summary (last 24 hours): Intake/Output Summary (Last 24 hours) at 18 0905  Last data filed at 18 0500   Gross per 24 hour   Intake             1800 ml   Output             1200 ml   Net              600 ml       Physical Exam:     Physical Exam  Generally well-developed reasonably nourished female in no acute distress she is normocephalic atraumatic pupils equal round and reactive to light extraocular muscles intact mucous membranes are moist neck is supple there is no JVD no lymph nodes no carotid bruits  Chest decreased but clear to auscultation anteriorly no rhonchi rales or wheezes  Cardiovascular regular rate rhythm positive S1 and S2 no S3-S4 murmur or gallop  Abdomen soft nontender nondistended with positive bowel sounds no hepatosplenomegaly no guarding or rebound  Additional Data:   Labs:    Results from last 7 days  Lab Units 18  0518  1032   WBC Thousand/uL 5 48  < > 5 63   HEMOGLOBIN g/dL 11 3*  < > 11 3*   HEMATOCRIT % 35 1  < > 35 2   PLATELETS Thousands/uL 150  < > 163   NEUTROS PCT %  --   --  65   LYMPHS PCT %  --   --  18   MONOS PCT %  --   --  10   EOS PCT %  --   --  6   < > = values in this interval not displayed      Results from last 7 days  Lab Units 18  0525  18  1032   SODIUM mmol/L 142  < > 146*   POTASSIUM mmol/L 3 7  < > 3 6   CHLORIDE mmol/L 105  < > 108   CO2 mmol/L 28  < > 32   BUN mg/dL 30*  < > 32*   CREATININE mg/dL 1 61*  < > 1 95*   CALCIUM mg/dL 9 0  < > 9 2   TOTAL PROTEIN g/dL  --   --  7 1   BILIRUBIN TOTAL mg/dL  --   --  0 50   ALK PHOS U/L  --   --  51   ALT U/L  --   --  34   AST U/L  --   --  25   GLUCOSE RANDOM mg/dL 129  < > 187*   < > = values in this interval not displayed  Results from last 7 days  Lab Units 05/09/18  0506   INR  2 07       * I Have Reviewed All Lab Data Listed Above  * Additional Pertinent Lab Tests Reviewed: All Labs Within Last 24 Hours Reviewed    Imaging:    Imaging Reports Reviewed Today Include:  None    Cultures:   Blood Culture:   Lab Results   Component Value Date    BLOODCX No Growth After 5 Days  12/10/2017    BLOODCX No Growth After 5 Days   12/10/2017     Urine Culture:   Lab Results   Component Value Date    URINECX >100,000 cfu/ml Enterococcus faecalis (A) 03/22/2018    URINECX 40,000-49,000 cfu/ml Stenotrophomonas maltophilia (A) 03/22/2018    URINECX >100,000 cfu/ml Citrobacter freundii (A) 12/10/2017    URINECX 50,000-59,000 cfu/ml Providencia rettgeri (A) 12/10/2017     Sputum Culture: No components found for: SPUTUMCX  Wound Culture: No results found for: WOUNDCULT    Last 24 Hours Medication List:     Current Facility-Administered Medications:  acetaminophen 488 mg Oral Q6H PRN TANVI Curry   albuterol 2 puff Inhalation Q6H PRN TANVI Curry   albuterol 2 5 mg Nebulization Q6H PRN TANVI Curry   aspirin 81 mg Oral Daily TANVI Curry   atorvastatin 10 mg Oral Daily AltheaTANVI Aleman   buPROPion 150 mg Oral Daily AltheaTANVI Aleman   carBAMazepine 100 mg Oral TID TANVI Curry   diltiazem 180 mg Oral Q12H [address] Stephani Sims PA-C   divalproex sodium 250 mg Oral Daily TANVI Curry   enoxaparin 1 mg/kg Subcutaneous Q24H [address] Deanne MD   ferrous sulfate 325 mg Oral BID TANVI Curry   fluticasone-salmeterol 1 puff Inhalation Q12H [address] TANVI Curry   furosemide 60 mg Oral Daily Natalie Dunn MD   levothyroxine 88 mcg Oral Early Morning TANVI Curry   metoprolol 5 mg Intravenous Q6H PRN Alden Deiters, CRNP   metoprolol tartrate 75 mg Oral Q12H [address] Antbedralf Izaguirre PA-C   nitroglycerin 0 4 mg Sublingual Q5 Min PRN Glenys Quinteros PA-C   potassium chloride 20 mEq Oral Daily Alden Deiters, CRNP   senna-docusate sodium 1 tablet Oral HS Alden Deiters, CRNP   warfarin 5 mg Oral Daily (warfarin) Oli Hawkins MD        Today, Patient Was Seen By: Oli Hawkins MD    ** Please Note: Dragon 360 Dictation voice to text software may have been used in the creation of this document   **

## 2018-05-11 NOTE — ASSESSMENT & PLAN NOTE
· CKD stage 3 with a baseline creatinine of 1 5  · Patient appears to have an acute kidney injury on CKD  · Patient was given a gentle bolus of 500 cc in the emergency department  · Lasix being titrated  · Patient has single kidney with chronic indwelling baca catheter, due to be changed on Friday per daughter  · Will check a BMP

## 2018-05-11 NOTE — ASSESSMENT & PLAN NOTE
· Known history with recent diagnosis patient is currently showing on ECG and telemetry atrial flutter with RVR   · Patient presented with some intermittent RVR in the ED  · Continue home dose of Cardizem and metoprolol  · Will add p r n  metoprolol 5 mg for sustained rate greater than 120  · Patient is on Coumadin her INR improving   · Telemetry monitoring: rate uncontrolled afib noted  · Cardiology consult:appreciated recommendations  · Patient is known to Dr Burton Martinez there is concern sleep apnea could be contributing to patient's arrhythmia  Discontinue continuous pulse ox    Outpatient sleep study recommended

## 2018-05-11 NOTE — ASSESSMENT & PLAN NOTE
· Out of bed with assistance, observed patient ambulating in joshua with assistance, did well  · Bed alarm for safety   · PT/OT working with patient    Plan for transition to short-term rehab prior to going back to the personal care home because she will have to do steps in order to get to her bedroom

## 2018-05-12 VITALS
DIASTOLIC BLOOD PRESSURE: 60 MMHG | HEART RATE: 100 BPM | OXYGEN SATURATION: 93 % | TEMPERATURE: 98.9 F | WEIGHT: 192.24 LBS | SYSTOLIC BLOOD PRESSURE: 128 MMHG | RESPIRATION RATE: 20 BRPM | BODY MASS INDEX: 35.38 KG/M2 | HEIGHT: 62 IN

## 2018-05-12 LAB
INR PPP: 2.45 (ref 0.86–1.16)
PROTHROMBIN TIME: 27.2 SECONDS (ref 12.1–14.4)

## 2018-05-12 PROCEDURE — 85610 PROTHROMBIN TIME: CPT | Performed by: INTERNAL MEDICINE

## 2018-05-12 PROCEDURE — 99231 SBSQ HOSP IP/OBS SF/LOW 25: CPT | Performed by: INTERNAL MEDICINE

## 2018-05-12 PROCEDURE — 99239 HOSP IP/OBS DSCHRG MGMT >30: CPT | Performed by: INTERNAL MEDICINE

## 2018-05-12 RX ORDER — WARFARIN SODIUM 5 MG/1
TABLET ORAL
Qty: 30 TABLET | Refills: 0 | Status: SHIPPED | OUTPATIENT
Start: 2018-05-12 | End: 2018-07-16 | Stop reason: ALTCHOICE

## 2018-05-12 RX ORDER — NITROGLYCERIN 0.4 MG/1
0.4 TABLET SUBLINGUAL
Qty: 90 TABLET | Refills: 0 | Status: SHIPPED | OUTPATIENT
Start: 2018-05-12

## 2018-05-12 RX ORDER — DILTIAZEM HYDROCHLORIDE 90 MG/1
180 CAPSULE, EXTENDED RELEASE ORAL EVERY 12 HOURS SCHEDULED
Qty: 30 CAPSULE | Refills: 0 | Status: ON HOLD | OUTPATIENT
Start: 2018-05-12 | End: 2018-12-14

## 2018-05-12 RX ORDER — DIGOXIN 125 MCG
125 TABLET ORAL DAILY
Status: DISCONTINUED | OUTPATIENT
Start: 2018-05-12 | End: 2018-05-12 | Stop reason: HOSPADM

## 2018-05-12 RX ORDER — FUROSEMIDE 20 MG/1
60 TABLET ORAL DAILY
Qty: 30 TABLET | Refills: 0 | Status: SHIPPED | OUTPATIENT
Start: 2018-05-13 | End: 2018-06-21 | Stop reason: ALTCHOICE

## 2018-05-12 RX ORDER — METOPROLOL TARTRATE 75 MG/1
75 TABLET, FILM COATED ORAL EVERY 12 HOURS SCHEDULED
Qty: 60 TABLET | Refills: 0 | Status: SHIPPED | OUTPATIENT
Start: 2018-05-12 | End: 2018-06-19 | Stop reason: HOSPADM

## 2018-05-12 RX ORDER — DIGOXIN 125 MCG
125 TABLET ORAL DAILY
Qty: 30 TABLET | Refills: 0 | Status: SHIPPED | OUTPATIENT
Start: 2018-05-12 | End: 2018-06-19 | Stop reason: HOSPADM

## 2018-05-12 RX ADMIN — CARBAMAZEPINE 100 MG: 100 TABLET, CHEWABLE ORAL at 09:04

## 2018-05-12 RX ADMIN — FLUTICASONE PROPIONATE AND SALMETEROL 1 PUFF: 50; 100 POWDER RESPIRATORY (INHALATION) at 09:08

## 2018-05-12 RX ADMIN — DIGOXIN 125 MCG: 0.12 TABLET ORAL at 10:43

## 2018-05-12 RX ADMIN — FUROSEMIDE 60 MG: 40 TABLET ORAL at 09:04

## 2018-05-12 RX ADMIN — ATORVASTATIN CALCIUM 10 MG: 10 TABLET, FILM COATED ORAL at 09:07

## 2018-05-12 RX ADMIN — METOPROLOL TARTRATE 75 MG: 50 TABLET ORAL at 09:05

## 2018-05-12 RX ADMIN — DIVALPROEX SODIUM 250 MG: 250 TABLET, FILM COATED, EXTENDED RELEASE ORAL at 09:07

## 2018-05-12 RX ADMIN — DILTIAZEM HYDROCHLORIDE 180 MG: 90 CAPSULE, EXTENDED RELEASE ORAL at 09:08

## 2018-05-12 RX ADMIN — ENOXAPARIN SODIUM 90 MG: 100 INJECTION SUBCUTANEOUS at 09:04

## 2018-05-12 RX ADMIN — ASPIRIN 81 MG: 81 TABLET, COATED ORAL at 09:04

## 2018-05-12 RX ADMIN — Medication 325 MG: at 09:07

## 2018-05-12 RX ADMIN — LEVOTHYROXINE SODIUM 88 MCG: 88 TABLET ORAL at 05:44

## 2018-05-12 RX ADMIN — POTASSIUM CHLORIDE 20 MEQ: 1500 TABLET, EXTENDED RELEASE ORAL at 09:04

## 2018-05-12 RX ADMIN — BUPROPION HYDROCHLORIDE 150 MG: 150 TABLET, FILM COATED, EXTENDED RELEASE ORAL at 09:07

## 2018-05-12 NOTE — ASSESSMENT & PLAN NOTE
· BNP BMP is greater than 3000 however this is less than 1 was in April patient is not showing clinical signs of volume overload  · Chest x-ray is not showing evidence of vascular congestion  · Acute on chronic exacerbation of edema last evening while in the bathroom off oxygen  Lasix given and uptitrated by cardiology this am  Appears improved this am    · Lasix will be maintained at 60 mg daily  , Lopressor will be 75 mg b i d  and Cardizem was added at 180 mg b i d  consideration for increasing Lasix to 80 mg will be for future evaluation if creatinine is stable or symptoms uncontrolled

## 2018-05-12 NOTE — ASSESSMENT & PLAN NOTE
· Does not appear in acute exacerbation  · Patient did have mild hypoxia in the ED this overall improved with oxygen  · Respiratory protocol  · Continue p r n  nebulizers, and Advair

## 2018-05-12 NOTE — ASSESSMENT & PLAN NOTE
· Supportive measures  [address] came to evaluate  Patient would do well to be transition to skilled nursing for brief period of rehabilitation prior to going Active the personal care home    Continue Wellbutrin

## 2018-05-12 NOTE — DISCHARGE SUMMARY
Discharge Summary - [facility]    Patient Information: Ezekiel Martinez 71 y o  female MRN: [id number]  Unit/Bed#: -01 Encounter: [id number]    Discharging Physician / Practitioner: Oli Hawkins MD  PCP: Saurabh Ojeda DO  Admission Date: 5/7/2018  Discharge Date: 05/12/18    Reason for Admission:  Fast heart rate    Discharge Diagnoses:     Principal Problem:    Atrial fibrillation [facility]):  Currently attempting to rate control her with multiple medications digoxin added to long-acting Cardizem and increase dose of metoprolol  Patient to follow up as an outpatient with Dr Serafin Palacios  Patient should be maintained on Coumadin to achieve an INR of 2 5-3 0  Discharging INR is 2 45 on an increased dose of Coumadin  Okay to discontinue Lovenox bridge when INR is therapeutic  Active Problems:  ·   COPD (chronic obstructive pulmonary disease) ([facility] ):  Currently, not exacerbated continue with albuterol as needed for wheezing, resume Breo in place of Advair  Patient has a history of decreased FEV1  Patient will need outpatient sleep study to rule out possible sleep apnea pulmonary will contact family for follow-up appointment  ·   HTN (hypertension):  Reasonably controlled on multiple medications including Cardizem and metoprolol  ·   Chronic diastolic [facility]  Currently improved  We believe that she had flash pulmonary edema related to elevated heart rates when she was ambulating with about oxygen therapy  For now patient will discharged to facility on oxygen therapy for consideration of weaning at over time  For now keep Lasix at 60 mg, if she decompensates she should be increased back to 80 mg with the blessing of Cardiology  Continue potassium replacement  ·   Dementia:  Mild  Patient responds to reinforcement and environmental familiarity    Hopeful to get her back to her personal care home as soon as possible  ·   Ambulatory dysfunction:  Improving patient heading for rehabilitation acutely for the next 7-10 days and then to home for care under her family  She may need home oxygen at the time of discharged home, and this should be considered  ·   Acute on chronic respiratory failure [facility]):  Improving  Patient had a period of exacerbation related to likely flash pulmonary edema he responded to diuresis  Currently patient is is still on oxygen therapy this should be reassessed before going to home    · Seizure disorder [facility]):  Continue carbamazepine and Depakote  ·   Stage 3 chronic kidney disease:  Avoid nephrotoxins drugs  Follow up with primary care physician, referred to Nephrology if condition worsens  Resolved Problems:         Consultations During Hospital Stay:  · Cardiology colon schedule an appointment to follow up with Dr Chantal Holloway in 2 weeks  Patient then has a vascular studies scheduled for June 1st this might be able to be done at the same time  Please call and collaborate with the cardiology office  · Pulmonology:  Office will call and set up an appointment with family for outpatient sleep study and further evaluation    Procedures Performed:     · Chest x-ray:  Marked interstitial prominence hazy densities suggesting severe congestive heart failure(patient treated with Lasix IV)    Significant Findings / Test Results:     · As above  · Creatinine at discharge 1 51  · INR 2 45    Incidental Findings:   · None  · Positive urinalysis without evidence for infection  No antibiotics were utilized    Test Results Pending at Discharge (will require follow up): · None     Outpatient Tests Requested:  · None    Complications:  None    Hospital Course: Jason Bejarano is a 71 y o  female patient who originally presented to the hospital on 5/7/2018 due to increased heart rate  Patient presented with a racing pulse and pain in her back  She has a known history of atrial fibrillation with atrial flutter    Her caregivers found to have a high pulse and center to the emergency room for further evaluation  Patient was noted at that time to be hypoxemic with subtherapeutic INR  Patient presented with lower extremity edema  She was seen by Cardiology diuresis was initiated  She had an event during hospital stay will requiring a rapid response where she ambulated to the bathroom without oxygen resulting in near syncope and tachycardia with flash pulmonary edema  It is felt that this strain from being hypoxemic caused flash pulmonary edema  We therefore have maintained her on her oxygen therapy for now she will need to be evaluated for weaning prior to discharged home  Patient is also to be scheduled for an outpatient sleep study  Pulmonary saw the patient and felt that this may be contributing to her labile arrhythmia  The patient was treated aggressively with IV diuretic  Her Cardizem and metoprolol were adjusted to improve rate control  And digoxin was added to further improve rate control  On the day of discharge the patient is pleasantly confused awaiting patient Mckenna discharge back to rehab and then ultimately to home  Condition at Discharge: good     Discharge Day Visit / Exam:     Subjective:  Patient is feeling well no shortness of breath chest pain nausea vomiting    She reports moving her bowels in the last 24-48 hours  Vitals: Blood Pressure: 128/60 (05/12/18 0904)  Pulse: 100 (05/12/18 0904)  Temperature: 98 9 °F (37 2 °C) (05/12/18 0700)  Temp Source: Oral (05/12/18 0700)  Respirations: 20 (05/12/18 0300)  Height: 5' 2" (157 5 cm) (05/07/18 1002)  Weight - Scale: 87 2 kg (192 lb 3 9 oz) (05/12/18 0600)  SpO2: 93 % (05/12/18 0700)  Exam:   Physical Exam    Generally well-developed elderly female no acute distress she is normocephalic atraumatic pupils equal round and reactive to light extraocular muscles are intact mucous membranes moist neck is supple there is no JVD no lymph nodes no carotid bruits chest is decreased but clear to auscultation is no rhonchi rales or wheezes  Cardiovascular irregularly irregular positive S1 and S2 no S3-S4 murmur or gallop  Abdomen is soft nontender nondistended with positive bowel sounds no hepatosplenomegaly no guarding or rebound  Extremities show no clubbing cyanosis edema neurologically awake alert oriented to self but was short-term memory deficits    Discharge instructions/Information to patient and family:   See after visit summary for information provided to patient and family  Provisions for Follow-Up Care:  See after visit summary for information related to follow-up care and any pertinent home health orders  Disposition:     [place]    For Discharges to [facility] SNF:   · Not Applicable to this Patient - Not Applicable to this Patient    Planned Readmission:  None but patient is at high risk based on her underlying disease     Discharge Statement:  I spent process 40 minutes discharging the patient  Updated patient's daughter via phone and discussed with Cardiology This time was spent on the day of discharge  I had direct contact with the patient on the day of discharge  Greater than 50% of the total time was spent examining patient, answering all patient questions, arranging and discussing plan of care with patient as well as directly providing post-discharge instructions  Additional time then spent on discharge activities  Discharge Medications:  See after visit summary for reconciled discharge medications provided to patient and family        ** Please Note: This note has been constructed using a voice recognition system **

## 2018-05-12 NOTE — ASSESSMENT & PLAN NOTE
· Known history with recent diagnosis patient is currently showing on ECG and telemetry atrial flutter with RVR   · Patient presented with some intermittent RVR in the ED  · Continue home dose of Cardizem and metoprolol  · Will add p r n  metoprolol 5 mg for sustained rate greater than 120  · Patient is on Coumadin her INR improving   · Telemetry monitoring: rate uncontrolled afib noted  · Cardiology consult:appreciated recommendations  · Patient is known to Dr Dayana Glynn there is concern sleep apnea could be contributing to patient's arrhythmia  Discontinue continuous pulse ox    Outpatient sleep study recommended

## 2018-05-12 NOTE — ASSESSMENT & PLAN NOTE
Resolved  Patient had an episode of rapid response where she was excessively short of breath and hypoxic  It appears the patient may have had flash pulmonary edema which resolved after additional Lasix was given  Cardiology's titrating rate control medications which may improve her exertional dyspnea related to her atrial fibrillation   Rates still elevated intermittently may need additional agents

## 2018-05-12 NOTE — PROGRESS NOTES
Progress Note Ben Lr 2/14/1874, 71 y o  female MRN: [id number]    Unit/Bed#: -01 Encounter: [id number]    Primary Care Provider: Salvador Ramirez DO   Date and time admitted to hospital: 5/7/2018  9:59 AM        Acute on chronic respiratory failure [facility])   Assessment & Plan    Resolved  Patient had an episode of rapid response where she was excessively short of breath and hypoxic  It appears the patient may have had flash pulmonary edema which resolved after additional Lasix was given  Cardiology's titrating rate control medications which may improve her exertional dyspnea related to her atrial fibrillation  Rates still elevated intermittently may need additional agents        COPD (chronic obstructive pulmonary disease) ([facility])   Assessment & Plan    · Does not appear in acute exacerbation  · Patient did have mild hypoxia in the ED this overall improved with oxygen  · Respiratory protocol  · Continue p r n  nebulizers, and Advair        HTN (hypertension)   Assessment & Plan    · Blood pressure soft  · Continue current regimen with parameters, cardiology titrating agent        * Atrial fibrillation ([address] )   Assessment & Plan    · Known history with recent diagnosis patient is currently showing on ECG and telemetry atrial flutter with RVR   · Patient presented with some intermittent RVR in the ED  · Continue home dose of Cardizem and metoprolol  · Will add p r n  metoprolol 5 mg for sustained rate greater than 120  · Patient is on Coumadin her INR improving   · Telemetry monitoring: rate uncontrolled afib noted  · Cardiology consult:appreciated recommendations  · Patient is known to Dr Holly Hart there is concern sleep apnea could be contributing to patient's arrhythmia  Discontinue continuous pulse ox  Outpatient sleep study recommended    Lovenox bridge since INR decreased        Chronic diastolic congestive heart failure (HCC)   Assessment & Plan    · BNP BMP is greater than 3000 however this is less than 1 was in April patient is not showing clinical signs of volume overload  · Chest x-ray is not showing evidence of vascular congestion  · Acute on chronic exacerbation of edema last evening while in the bathroom off oxygen  Lasix given and uptitrated by cardiology this am  Appears improved this am    · Lasix will be maintained at 60 mg daily  , Lopressor will be 75 mg b i d  and Cardizem was added at 180 mg b i d  consideration for increasing Lasix to 80 mg will be for future evaluation if creatinine is stable or symptoms uncontrolled  Ambulatory dysfunction   Assessment & Plan    · Out of bed with assistance, observed patient ambulating in joshua with assistance, did well  · Bed alarm for safety   · PT/OT working with patient  Plan for transition to short-term rehab prior to going back to the personal care home because she will have to do steps in order to get to her bedroom        Dementia   Assessment & Plan    · Supportive measures  [address] came to evaluate  Patient would do well to be transition to skilled nursing for brief period of rehabilitation prior to going Active the personal care home  Continue Wellbutrin        Seizure disorder [facility])   Assessment & Plan    · Seizure precautions  · Continue Tegretol and Depakote        Stage 3 chronic kidney disease   Assessment & Plan    · CKD stage 3 with a baseline creatinine of 1 5  · Patient appears to have an acute kidney injury on CKD  · Patient was given a gentle bolus of 500 cc in the emergency department  · Lasix being titrated  · Patient has single kidney with chronic indwelling baca catheter, due to be changed on Friday per daughter  Abca was changed in anticipation of discharge  ·           VTE Pharmacologic Prophylaxis:   Pharmacologic: Warfarin (Coumadin)  Mechanical: Mechanical VTE prophylaxis in place  Patient Centered Rounds: I have performed bedside rounds with nursing staff today    Discussions with Specialists or Other Care Team Provider:  Cardiology  Education and Discussions with Family / Patient:  Vargas Goldsmith not available  Time Spent for Care: 20 minutes  More than 50% of total time spent on counseling and coordination of care as described above  Current Length of Stay: 4 day(s)  Current Patient Status: Inpatient   Certification Statement: The patient will continue to require additional inpatient hospital stay due to Continuing to titrate Cardiology medications for rate control  Patient approved for rehab placement in a   Discharge Plan:  Discharge in a   to rehab then on to her personal care home  Code Status: Level 3 - DNAR and DNI    Subjective:   Patient feeling well anticipating going to rehab in a   Objective:   Vitals:   Temp (24hrs), Av 3 °F (36 8 °C), Min:97 8 °F (36 6 °C), Max:98 9 °F (37 2 °C)    HR:  [] 100  Resp:  [20-21] 20  BP: (114-151)/(60-82) 128/60  SpO2:  [91 %-95 %] 93 %  Body mass index is 35 16 kg/m²  Input and Output Summary (last 24 hours):        Intake/Output Summary (Last 24 hours) at 18 1044  Last data filed at 18 0610   Gross per 24 hour   Intake              720 ml   Output                0 ml   Net              720 ml       Physical Exam:     Physical Exam  Generally well-developed elderly female, in no acute distress he is normocephalic atraumatic pupils equal round and reactive to light extraocular muscles are intact mucous membranes are moist neck is supple there is no JVD no lymph nodes no carotid bruits chest is decreased with poor air entry there are no rhonchi rales or wheezes  Cardiovascular irregularly irregular positive S1 and S2 no S3-S4 murmur gallop or gallop  Extremities no clubbing cyanosis trace edema is noted  Neurologically patient is pleasantly confused  Additional Data:   Labs:    Results from last 7 days  Lab Units 18  0525  18  1032   WBC Thousand/uL 5 48  < > 5 63   HEMOGLOBIN g/dL 11 3*  < > 11 3*   HEMATOCRIT % 35 1 < > 35 2   PLATELETS Thousands/uL 150  < > 163   NEUTROS PCT %  --   --  65   LYMPHS PCT %  --   --  18   MONOS PCT %  --   --  10   EOS PCT %  --   --  6   < > = values in this interval not displayed  Results from last 7 days  Lab Units 05/11/18  1235  05/07/18  1032   SODIUM mmol/L 142  < > 146*   POTASSIUM mmol/L 3 6  < > 3 6   CHLORIDE mmol/L 104  < > 108   CO2 mmol/L 31  < > 32   BUN mg/dL 24  < > 32*   CREATININE mg/dL 1 51*  < > 1 95*   CALCIUM mg/dL 9 5  < > 9 2   TOTAL PROTEIN g/dL  --   --  7 1   BILIRUBIN TOTAL mg/dL  --   --  0 50   ALK PHOS U/L  --   --  51   ALT U/L  --   --  34   AST U/L  --   --  25   GLUCOSE RANDOM mg/dL 126  < > 187*   < > = values in this interval not displayed  Results from last 7 days  Lab Units 05/12/18  0451   INR  2 45*       * I Have Reviewed All Lab Data Listed Above  * Additional Pertinent Lab Tests Reviewed: All Labs Within Last 24 Hours Reviewed    Imaging:    Imaging Reports Reviewed Today Include:  None    Cultures:   Blood Culture:   Lab Results   Component Value Date    BLOODCX No Growth After 5 Days  12/10/2017    BLOODCX No Growth After 5 Days   12/10/2017     Urine Culture:   Lab Results   Component Value Date    URINECX >100,000 cfu/ml Enterococcus faecalis (A) 03/22/2018    URINECX 40,000-49,000 cfu/ml Stenotrophomonas maltophilia (A) 03/22/2018    URINECX >100,000 cfu/ml Citrobacter freundii (A) 12/10/2017    URINECX 50,000-59,000 cfu/ml Providencia rettgeri (A) 12/10/2017     Sputum Culture: No components found for: SPUTUMCX  Wound Culture: No results found for: WOUNDCULT    Last 24 Hours Medication List:     Current Facility-Administered Medications:  acetaminophen 488 mg Oral Q6H PRN Bonner Reagin, CRNP   albuterol 2 puff Inhalation Q6H PRN Bonner Reagin, CRNP   albuterol 2 5 mg Nebulization Q6H PRN Bonner Reagin, CRNP   aspirin 81 mg Oral Daily Bonner Reagin, CRNP   atorvastatin 10 mg Oral Daily Bonner Reagin, CRARJUN   buPROPion 150 mg Oral Daily Serge Russian, CRNP   carBAMazepine 100 mg Oral TID Serjase Russian, CRNP   digoxin 125 mcg Oral Daily [facility], FELIZ   diltiazem 180 mg Oral Q12H [facility], FELIZ   divalproex sodium 250 mg Oral Daily Serge Russian, CRNP   enoxaparin 1 mg/kg Subcutaneous Q24H [facility] Nola Francois MD   ferrous sulfate 325 mg Oral BID Serge Russian, CRARJUN   fluticasone-salmeterol 1 puff Inhalation Q12H [facility] Serge Nepalese, CRNP   furosemide 60 mg Oral Daily Antolin Ma MD   levothyroxine 88 mcg Oral Early Morning Serjase Nepalese, CRNP   metoprolol 5 mg Intravenous Q6H PRN Ziyad Russian, CRARJUN   metoprolol tartrate 75 mg Oral Q12H [facility] Evelina Portillo, FELIZ   nitroglycerin 0 4 mg Sublingual Q5 Min PRN Loretta Ward PA-C   potassium chloride 20 mEq Oral Daily Serge Nepalese, CRNP   senna-docusate sodium 1 tablet Oral HS Serge Nepalese, CRNP   warfarin 5 mg Oral Daily (warfarin) Nola Francois MD        Today, Patient Was Seen By: Nola Francois MD    ** Please Note: Dragon 360 Dictation voice to text software may have been used in the creation of this document   **

## 2018-05-12 NOTE — PHYSICIAN ADVISOR
Current patient class: Inpatient  The patient is currently on Hospital Day: 5      The patient was admitted to the hospital  on 5/7/18 at 1155 for the following diagnosis:  Atrial fibrillation ([address] ) [I48 91]  TAM (acute kidney injury) ([address] ) [N17 9]  Atrial flutter with rapid ventricular response ([address] ) [I48 92]       There is documentation in the medical record of an expected length of stay of at least 2 midnights  The patient is therefore expected to satisfy the 2 midnight benchmark and given the 2 midnight presumption is appropriate for INPATIENT ADMISSION  Given this expectation of a satisfying stay, CMS instructs us that the patient is most often appropriate for inpatient admission under part A provided medical necessity is documented in the chart  After review of the relevant documentation, labs, vital signs and test results, the patient is appropriate for INPATIENT ADMISSION  Admission to the hospital as an inpatient is a complex decision making process which requires the practitioner to consider the patients presenting complaint, history and physical examination and all relevant testing  With this in mind, in this case, the patient was deemed appropriate for INPATIENT ADMISSION  After review of the documentation and testing available at the time of the admission I concur with this clinical determination of medical necessity  The patient does have an inpatient admission within the previous 30 days  The patient was admitted on 4/13/18 and discharged on 4/18/18 as an inpatient  The patient therefore required readmission review  In this case the patient should be considered a SEPARATE and UNRELATED INPATIENT ADMISSION  The patient had been discharged in stable condition with a completed care plan  There were no unresolved acute medical issues at the time of discharged which would have reasonably been expected to prompt this readmission  Rationale is as follows:     The patient is a 71 yrs   Female who presented to the ED at 5/7/2018  9:59 AM with a chief complaint of Atrial Fibrillation (Pt presents at request of Frida Rae personal [facility] home for Afib (prior hx); On EMS arrival pt had c/o SOB  On ED arrival, pt has no complaints )     The patient presented and was found to have  A pulse up to 150 and hypoxia with  A pulse ox of 89%  The patient is being admitted with rapid atrial fibrillation  The plan of care includes home meds, telemetry monitoring, prn metoprolol, cardiology and pulmonary consultations  This patient is appropriate for INPATIENT admission; continued hospitalization is necessary to ensure stabilization of her clinical status and heart rate  This admission is UNRELATED to her prior admission as she was treated and discharged in stable condition with improvement in her acute symptoms      The patients vitals on arrival were ED Triage Vitals   Temperature Pulse Respirations Blood Pressure SpO2   05/07/18 1020 05/07/18 1002 05/07/18 1002 05/07/18 1002 05/07/18 1002   98 6 °F (37 °C) (!) 110 20 100/55 (!) 89 %      Temp Source Heart Rate Source Patient Position - Orthostatic VS BP Location FiO2 (%)   05/07/18 1020 05/07/18 1002 05/07/18 1002 05/07/18 1002 --   Oral Monitor Lying Left arm       Pain Score       05/07/18 1200       No Pain           Past Medical History:   Diagnosis Date    [facility]     Brain aneurysm     CAD (coronary artery disease)     Cardiac disease     had stents 12 years ago in [address] [place] [address] CHF (congestive heart failure) ([facility] )     CKD (chronic kidney disease)     COPD (chronic obstructive pulmonary disease) (HCC)     Dementia     Dementia     Disease of thyroid gland     Hyperlipidemia     Hyperlipidemia     Hypertension     MI (myocardial infarction) ([facility] )     Migraine     Renal disorder     Seizures ([facility] )     Stroke [facility])      Past Surgical History:   Procedure Laterality Date    APPENDECTOMY      BLADDER TUMOR EXCISION      BRAIN SURGERY      CARDIAC SURGERY      CORONARY STENT PLACEMENT      5 stents    EYE SURGERY      MANDIBLE FRACTURE SURGERY      TONSILLECTOMY      TUBAL LIGATION      UTERINE FIBROID SURGERY             Consults have been placed to:   IP CONSULT TO CARDIOLOGY  IP CONSULT TO CARDIOLOGY  IP CONSULT TO PULMONOLOGY    Vitals:    05/11/18 0700 05/11/18 1100 05/11/18 1550 05/11/18 1900   BP: 99/70 132/78 123/76 151/82   BP Location: Right arm Left arm Left arm Left arm   Pulse: 70 61 102    Resp: 18 20 20 21   Temp: 98 9 °F (37 2 °C) 98 4 °F (36 9 °C) 98 1 °F (36 7 °C) 97 8 °F (36 6 °C)   TempSrc: Oral Oral Oral Oral   SpO2: 93% 93% 94% 93%   Weight:       Height:           Most recent labs:    Recent Labs      05/09/18   0525   05/11/18   0506  05/11/18   1235   WBC  5 48   --    --    --    HGB  11 3*   --    --    --    HCT  35 1   --    --    --    PLT  150   --    --    --    K  3 7   --    --   3 6   NA  142   --    --   142   CALCIUM  9 0   --    --   9 5   BUN  30*   --    --   24   CREATININE  1 61*   --    --   1 51*   INR   --    < >  1 84*   --     < > = values in this interval not displayed         Scheduled Meds:  Current Facility-Administered Medications:  acetaminophen 488 mg Oral Q6H PRN TANVI Torres   albuterol 2 puff Inhalation Q6H PRN TANVI Torres   albuterol 2 5 mg Nebulization Q6H PRN TANVI Torres   aspirin 81 mg Oral Daily TANVI Torres   atorvastatin 10 mg Oral Daily TANVI Torres   buPROPion 150 mg Oral Daily TANVI Torres   carBAMazepine 100 mg Oral TID TANVI Torres   diltiazem 180 mg Oral Q12H [address] Tamiko Stephens PA-C   divalproex sodium 250 mg Oral Daily TANVI Torres   enoxaparin 1 mg/kg Subcutaneous Q24H [address] Zoey Markham MD   ferrous sulfate 325 mg Oral BID TANVI Torres   fluticasone-salmeterol 1 puff Inhalation Q12H [address] TANVI Torres   furosemide 60 mg Oral Daily Estelle Romero MD   levothyroxine 88 mcg Oral Early Morning [place] Dante, CRNP   metoprolol 5 mg Intravenous Q6H PRN Jaquelin Dante, CRARJUN   metoprolol tartrate 75 mg Oral Q12H [address] Jody Gottlieb PA-C   nitroglycerin 0 4 mg Sublingual Q5 Min PRN Annmarie Anglin PA-C   potassium chloride 20 mEq Oral Daily [place], TANVI   senna-docusate sodium 1 tablet Oral HS [place]   warfarin 5 mg Oral Daily (warfarin) Shelly Negrete MD     Continuous Infusions:   PRN Meds:   acetaminophen    albuterol    albuterol    metoprolol    nitroglycerin

## 2018-05-12 NOTE — PROGRESS NOTES
RN attempted to call Zaida to give receiving RN report on pt  Vickie Cervantes took the call and tried transferring me to receiving nurse X2  Zaida unable to take report at the time so i gave Vickie Cervantes the phone number to have the receiving RN call once she is available

## 2018-05-12 NOTE — PROGRESS NOTES
General Cardiology   Progress Note   Mabel Joya 71 y o  female MRN: [id number]  Unit/Bed#: -01 Encounter: [id number]      SUBJECTIVE:   No significant events overnight  HR slightly elevated  Denies chest pain, SOB  OBJECTIVE:   Vitals:  Vitals:    18 0904   BP: 128/60   Pulse: 100   Resp: 20   Temp:    SpO2: 93%     Body mass index is 35 16 kg/m²  Systolic (16XMN), QAP:747 , Min:114 , IO     Diastolic (80XJP), WYE:12, Min:60, Max:82      Intake/Output Summary (Last 24 hours) at 18 0917  Last data filed at 18 1816   Gross per 24 hour   Intake              360 ml   Output                0 ml   Net              360 ml     Weight (last 2 days)     Date/Time   Weight    18 0600  87 2 (192 24)    18 0600  86 3 (190 26)    05/10/18 0550  85 2 (187 83)              Telemetry Review: A Fib, HR 100s    PHYSICAL EXAMS:  General:  Patient is not in acute distress, laying in the bed comfortably, awake, alert responding to commands  Head: Normocephalic, Atraumatic  HEENT:  Both pupils normal-size atraumatic, normocephalic, nonicteric  Neck:  JVP not raised  Trachea central  Respiratory:  Bronchovascular breathing all over the chest without any accompaniment  Cardiovascular:  +irregularly irregular  No murmurs, rubs, gallops  GI:  Abdomen soft nontender   Liver and spleen normal size  Lymphatic:  No cervical or inguinal lymphadenopathy  Neurologic:  Patient is awake alert, responding to command, well-oriented to time and place and person moving     LABORATORY RESULTS:    Results from last 7 days  Lab Units 18  1032   TROPONIN I ng/mL <0 02       CBC with diff:   Results from last 7 days  Lab Units 18  0525 18  0546 18  1032   WBC Thousand/uL 5 48 6 49 5 63   HEMOGLOBIN g/dL 11 3* 11 4* 11 3*   HEMATOCRIT % 35 1 35 4 35 2   MCV fL 103* 104* 105*   PLATELETS Thousands/uL 150 153 163   MCH pg 33 0 33 3 33 6   MCHC g/dL 32 2 32 2 32 1   RDW % 12 7 13 1 12 9 MPV fL 11 2 11 8 11 9   NRBC AUTO /100 WBCs  --   --  0       CMP:  Results from last 7 days  Lab Units 18  1235 18  0525 18  0546 18  1032   SODIUM mmol/L 142 142 144 146*   POTASSIUM mmol/L 3 6 3 7 4 2 3 6   CHLORIDE mmol/L 104 105 107 108   CO2 mmol/L 31 28 29 32   ANION GAP mmol/L 7 9 8 6   BUN mg/dL 24 30* 34* 32*   CREATININE mg/dL 1 51* 1 61* 1 86* 1 95*   GLUCOSE RANDOM mg/dL 126 129 111 187*   CALCIUM mg/dL 9 5 9 0 9 3 9 2   AST U/L  --   --   --  25   ALT U/L  --   --   --  34   ALK PHOS U/L  --   --   --  51   TOTAL PROTEIN g/dL  --   --   --  7 1   BILIRUBIN TOTAL mg/dL  --   --   --  0 50   EGFR ml/min/1 73sq m 35 32 27 25       BMP:  Results from last 7 days  Lab Units 18  1235 18  0525 18  0546   SODIUM mmol/L 142 142 144   POTASSIUM mmol/L 3 6 3 7 4 2   CHLORIDE mmol/L 104 105 107   CO2 mmol/L 31 28 29   BUN mg/dL 24 30* 34*   CREATININE mg/dL 1 51* 1 61* 1 86*   GLUCOSE RANDOM mg/dL 126 129 111   CALCIUM mg/dL 9 5 9 0 9 3         Results from last 7 days  Lab Units 18  1032   NT-PRO BNP pg/mL 3,327*                    Results from last 7 days  Lab Units 18  0451 18  0506 05/10/18  0541 18  0435 18  0546 18  1032   INR  2 45* 1 84* 2 13* 2 07* 1 82* 1 80*       Lipid Profile:   No results found for: CHOL  No results found for: HDL  No results found for: LDLCALC  No results found for: TRIG    Cardiac testing:  Results for orders placed during the hospital encounter of 12/10/17   Echo complete with contrast if indicated    [address] [id number] [phone]    Transthoracic Echocardiogram  2D, M-mode, Doppler, and Color Doppler    Study date:  11-Dec-2017    Patient: Mateus Matute  MR number: [id number]  Account number: [de-identified]  : 1945  Age: 71 years  Gender: Female  Status: Inpatient  Location: Bedside  Height: 60 in  Weight: 192 lb  BP: 99/ 50 mmHg    Indications: Shortness of breath  Diagnoses: R06 00 - Dyspnea, unspecified, R06 02 - Shortness of breath    Sonographer:  Eileen Polo  Interpreting Physician:  Randy Quiles MD  Referring Physician:  Max Yoo PA-C  Group:  [facility]    SUMMARY    LEFT VENTRICLE:  Systolic function was normal  Ejection fraction was estimated in the range of 55 % to 65 %  There were no regional wall motion abnormalities  There was mild concentric hypertrophy  Doppler parameters were consistent with abnormal left ventricular relaxation (grade 1 diastolic dysfunction)  MITRAL VALVE:  There was mild annular calcification  There was mild regurgitation  AORTIC VALVE:  There was mild regurgitation  TRICUSPID VALVE:  There was mild regurgitation  PULMONIC VALVE:  There was mild regurgitation  HISTORY: PRIOR HISTORY: Elevated trponin, Dementia, Seizures, Stroke, Myocardial infarction  Congestive heart failure  Risk factors: hypertension and hypercholesterolemia  Chronic lung disease  PROCEDURE: The procedure was performed at the bedside  This was a routine study  The transthoracic approach was used  The study included complete 2D imaging, M-mode, complete spectral Doppler, and color Doppler  The heart rate was 68 bpm,  at the start of the study  Images were obtained from the parasternal, apical, subcostal, and suprasternal notch acoustic windows  Echocardiographic views were limited due to poor acoustic window availability, decreased penetration, and  lung interference  This was a technically difficult study  LEFT VENTRICLE: Size was normal  Systolic function was normal  Ejection fraction was estimated in the range of 55 % to 65 %  There were no regional wall motion abnormalities  Wall thickness was normal  There was mild concentric  hypertrophy   DOPPLER: Doppler parameters were consistent with abnormal left ventricular relaxation (grade 1 diastolic dysfunction)  RIGHT VENTRICLE: The size was normal  Systolic function was normal  Wall thickness was normal     LEFT ATRIUM: Size was normal     RIGHT ATRIUM: Size was normal     MITRAL VALVE: There was mild annular calcification  Valve structure was normal  There was normal leaflet separation  DOPPLER: The transmitral velocity was within the normal range  There was no evidence for stenosis  There was mild  regurgitation  AORTIC VALVE: The valve was trileaflet  Leaflets exhibited normal thickness and normal cuspal separation  DOPPLER: Transaortic velocity was within the normal range  There was no evidence for stenosis  There was mild regurgitation  TRICUSPID VALVE: The valve structure was normal  There was normal leaflet separation  DOPPLER: The transtricuspid velocity was within the normal range  There was no evidence for stenosis  There was mild regurgitation  PULMONIC VALVE: Leaflets exhibited normal thickness, no calcification, and normal cuspal separation  DOPPLER: The transpulmonic velocity was within the normal range  There was mild regurgitation  PERICARDIUM: There was no pericardial effusion  The pericardium was normal in appearance  AORTA: The root exhibited normal size      SYSTEM MEASUREMENT TABLES    2D  %FS: 29 2 %  Ao Diam: 3 1 cm  EDV(Teich): 140 8 ml  EF(Teich): 55 6 %  ESV(Teich): 62 5 ml  IVSd: 1 1 cm  LA Area: 20 9 cm2  LA Diam: 3 7 cm  LVEDV MOD A4C: 117 1 ml  LVEF MOD A4C: 50 3 %  LVESV MOD A4C: 58 2 ml  LVIDd: 5 4 cm  LVIDs: 3 8 cm  LVLd A4C: 8 5 cm  LVLs A4C: 7 1 cm  LVPWd: 1 1 cm  RA Area: 17 5 cm2  RVIDd: 3 2 cm  SV MOD A4C: 59 ml  SV(Teich): 78 3 ml    CW  AR Dec [place]: 1 9 m/s2  AR Dec Time: 2208 1 ms  AR PHT: 640 4 ms  AR Vmax: 4 1 m/s  AR maxP 4 mmHg  TR Vmax: 2 5 m/s  TR maxP mmHg    MM  TAPSE: 1 4 cm    PW  AVC: 389 7 ms  E': 0 1 m/s  E/E': 15 3  MV A Anoop: 1 m/s  MV Dec [place]: 3 4 m/s2  MV DecT: 240 3 ms  MV E Anoop: 0 8 m/s  MV E/A Ratio: 0 8  MV PHT: 69 7 ms  MVA By PHT: 3 2 cm2    [facility]    Prepared and electronically signed by    Francesco Perez MD  Signed 11-Dec-2017 12:09:00         Meds/Allergies   all current active meds have been reviewed and current meds:   Current Facility-Administered Medications   Medication Dose Route Frequency    acetaminophen (TYLENOL) tablet 488 mg  488 mg Oral Q6H PRN    albuterol (PROVENTIL HFA,VENTOLIN HFA) inhaler 2 puff  2 puff Inhalation Q6H PRN    albuterol inhalation solution 2 5 mg  2 5 mg Nebulization Q6H PRN    aspirin (ECOTRIN LOW STRENGTH) EC tablet 81 mg  81 mg Oral Daily    atorvastatin (LIPITOR) tablet 10 mg  10 mg Oral Daily    buPROPion (WELLBUTRIN SR) 12 hr tablet 150 mg  150 mg Oral Daily    carBAMazepine (TEGretol) chewable tablet 100 mg  100 mg Oral TID    digoxin (LANOXIN) tablet 125 mcg  125 mcg Oral Daily    diltiazem (CARDIZEM SR) 12 hr capsule 180 mg  180 mg Oral Q12H [address]    divalproex sodium (DEPAKOTE ER) 24 hr tablet 250 mg  250 mg Oral Daily    enoxaparin (LOVENOX) subcutaneous injection 90 mg  1 mg/kg Subcutaneous Q24H TONI    ferrous sulfate tablet 325 mg  325 mg Oral BID    fluticasone-salmeterol (ADVAIR) 100-50 mcg/dose inhaler 1 puff  1 puff Inhalation Q12H [address]    furosemide (LASIX) tablet 60 mg  60 mg Oral Daily    levothyroxine tablet 88 mcg  88 mcg Oral Early Morning    metoprolol (LOPRESSOR) injection 5 mg  5 mg Intravenous Q6H PRN    metoprolol tartrate (LOPRESSOR) tablet 75 mg  75 mg Oral Q12H [address]    nitroglycerin (NITROSTAT) SL tablet 0 4 mg  0 4 mg Sublingual Q5 Min PRN    potassium chloride (K-DUR,KLOR-CON) CR tablet 20 mEq  20 mEq Oral Daily    senna-docusate sodium (SENOKOT S) 8 6-50 mg per tablet 1 tablet  1 tablet Oral HS    warfarin (COUMADIN) tablet 5 mg  5 mg Oral Daily (warfarin)     Prescriptions Prior to Admission   Medication    acetaminophen (TYLENOL) 500 mg tablet    albuterol (2 5 mg/3 mL) 0 083 % nebulizer solution    albuterol (PROVENTIL HFA,VENTOLIN HFA) 90 mcg/act inhaler    aspirin (ECOTRIN LOW STRENGTH) 81 mg EC tablet    atorvastatin (LIPITOR) 10 mg tablet    buPROPion (WELLBUTRIN SR) 150 mg 12 hr tablet    carBAMazepine (TEGretol) 100 mg chewable tablet    Cholecalciferol (VITAMIN D) 2000 units CAPS    diltiazem (CARDIZEM SR) 120 mg 12 hr capsule    divalproex sodium (DEPAKOTE ER) 250 mg 24 hr tablet    fenofibrate micronized (LOFIBRA) 67 MG capsule    ferrous sulfate 325 (65 Fe) mg tablet    fluticasone furoate-vilanterol (BREO ELLIPTA)    furosemide (LASIX) 40 mg tablet    Levothyroxine Sodium 88 MCG CAPS    metoprolol tartrate (LOPRESSOR) 50 mg tablet    potassium chloride (K-DUR,KLOR-CON) 20 mEq tablet    Sennosides-Docusate Sodium (SENNA PLUS PO)    warfarin (COUMADIN) 2 mg tablet          ASSESSMENT & PLAN   1   Atrial flutter with RVR:  Continue Cardizem and Lopressor for anticoagulation  Will add digoxin  Continue Coumadin for anticoagulation      2   Chronic diastolic Congestive heart failure:  -BNP 3327  CXR shows stable cardiomegaly  -[facility] December 2017 shows EF 55-65%, no regional wall motion abnormalities, grade 1 diastolic dysfunction  -currently euvolemic, Not currently in exacerbation   -Continue lasix at present dose   -continue Lopressor   -low-salt diet, daily weights, I/O      3   CAD S/P PCI:  No anginal symptoms at this time   Continue aspirin, statin, Lopressor      4   Hypertension:  /60  Continue present regimen      5   Hyperlipidemia:  Continue statin      Stable from cardiac standpoint  Will follow up with outpatient cardiologist Dr Dee Salazar PA-C  5/12/2018,9:17 AM    Portions of the record may have been created with voice recognition software   Occasional wrong word or "sound a like" substitutions may have occurred due to the inherent limitations of voice recognition software   Read the chart carefully and recognize, using context, where substitutions have occurred

## 2018-05-17 ENCOUNTER — TELEPHONE (OUTPATIENT)
Dept: CARDIOLOGY CLINIC | Facility: CLINIC | Age: 73
End: 2018-05-17

## 2018-05-17 NOTE — TELEPHONE ENCOUNTER
The physician at the nursing home should make the assessment if patient needs oxygen are not  I can't given order to patient in nursing home without seeing the patient

## 2018-05-17 NOTE — TELEPHONE ENCOUNTER
HATTIE FROM [facility] IS REQUESTING AN ORDER TO WEEN PT OFF OF O2  PT IS CURRENTLY ON 3 LITERS  DR Roldan Franco DR REQUESTING THIS  PT IS CONFUSED AND CONSTANTLY TAKING MASK OFF FOR O2  PLEASE FAX ORDER [phone]   Aki Bernstein

## 2018-05-18 ENCOUNTER — DOCUMENTATION (OUTPATIENT)
Dept: NON INVASIVE DIAGNOSTICS | Facility: HOSPITAL | Age: 73
End: 2018-05-18

## 2018-05-18 NOTE — TELEPHONE ENCOUNTER
Spoke with nursing home and they said they just need something in writing that you are OK with them trying to ween patient off the oxygen  Not an order, just an OK for them to try it due to her extensive cardiac history

## 2018-06-01 ENCOUNTER — HOSPITAL ENCOUNTER (OUTPATIENT)
Dept: NON INVASIVE DIAGNOSTICS | Facility: CLINIC | Age: 73
Discharge: HOME | End: 2018-06-01
Payer: MEDICARE

## 2018-06-01 DIAGNOSIS — I70.213 INTERMITTENT CLAUDICATION OF BOTH LOWER EXTREMITIES DUE TO ATHEROSCLEROSIS (HCC): ICD-10-CM

## 2018-06-01 PROCEDURE — 93978 VASCULAR STUDY: CPT

## 2018-06-01 PROCEDURE — 93923 UPR/LXTR ART STDY 3+ LVLS: CPT

## 2018-06-01 PROCEDURE — 93925 LOWER EXTREMITY STUDY: CPT

## 2018-06-05 PROCEDURE — 93925 LOWER EXTREMITY STUDY: CPT | Performed by: SURGERY

## 2018-06-05 PROCEDURE — 93978 VASCULAR STUDY: CPT | Performed by: SURGERY

## 2018-06-05 PROCEDURE — 93922 UPR/L XTREMITY ART 2 LEVELS: CPT | Performed by: SURGERY

## 2018-06-13 ENCOUNTER — TELEPHONE (OUTPATIENT)
Dept: CARDIOLOGY CLINIC | Facility: CLINIC | Age: 73
End: 2018-06-13

## 2018-06-13 NOTE — TELEPHONE ENCOUNTER
Notes recorded by Ronaldo Amos RN on 6/6/2018 at 9:53 AM EDT  [facility] for daughter Lorelei Ramos to call back to make an dee with Dr Xenia Acosta  ------    Notes recorded by Boni Howell MD on 6/5/2018 at 6:33 PM EDT  Please make an appointment with me to discuss abnormal arterial Doppler        I gave patient an appointment with Dr Xenia Acosta Thursday, June 21st at 2:00 in [facility] office to discuss abnormal vascular test per result note  Please put patient in that spot  Thanks

## 2018-06-15 ENCOUNTER — TELEPHONE (OUTPATIENT)
Dept: PULMONOLOGY | Facility: CLINIC | Age: 73
End: 2018-06-15

## 2018-06-16 ENCOUNTER — HOSPITAL ENCOUNTER (INPATIENT)
Facility: HOSPITAL | Age: 73
LOS: 2 days | Discharge: HOME HEALTH | DRG: 291 | End: 2018-06-19
Attending: EMERGENCY MEDICINE | Admitting: FAMILY MEDICINE
Payer: MEDICARE

## 2018-06-16 ENCOUNTER — APPOINTMENT (EMERGENCY)
Dept: RADIOLOGY | Facility: HOSPITAL | Age: 73
DRG: 291 | End: 2018-06-16
Payer: MEDICARE

## 2018-06-16 DIAGNOSIS — J96.11 CHRONIC HYPOXEMIC RESPIRATORY FAILURE (HCC): ICD-10-CM

## 2018-06-16 DIAGNOSIS — I48.91 ATRIAL FIBRILLATION WITH SLOW VENTRICULAR RESPONSE (HCC): ICD-10-CM

## 2018-06-16 DIAGNOSIS — I10 ESSENTIAL HYPERTENSION: Chronic | ICD-10-CM

## 2018-06-16 DIAGNOSIS — R09.02 HYPOXIA: ICD-10-CM

## 2018-06-16 DIAGNOSIS — J44.9 CHRONIC OBSTRUCTIVE PULMONARY DISEASE, UNSPECIFIED COPD TYPE (HCC): Chronic | ICD-10-CM

## 2018-06-16 DIAGNOSIS — I48.20 CHRONIC ATRIAL FIBRILLATION (HCC): ICD-10-CM

## 2018-06-16 DIAGNOSIS — R06.02 SHORTNESS OF BREATH: Primary | ICD-10-CM

## 2018-06-16 LAB
ALBUMIN SERPL BCP-MCNC: 3.6 G/DL (ref 3.5–5)
ALP SERPL-CCNC: 68 U/L (ref 46–116)
ALT SERPL W P-5'-P-CCNC: 44 U/L (ref 12–78)
ANION GAP SERPL CALCULATED.3IONS-SCNC: 7 MMOL/L (ref 4–13)
AST SERPL W P-5'-P-CCNC: 28 U/L (ref 5–45)
BASOPHILS # BLD AUTO: 0.04 THOUSANDS/ΜL (ref 0–0.1)
BASOPHILS NFR BLD AUTO: 1 % (ref 0–1)
BILIRUB SERPL-MCNC: 0.3 MG/DL (ref 0.2–1)
BUN SERPL-MCNC: 30 MG/DL (ref 5–25)
CALCIUM SERPL-MCNC: 9.4 MG/DL (ref 8.3–10.1)
CHLORIDE SERPL-SCNC: 104 MMOL/L (ref 100–108)
CO2 SERPL-SCNC: 30 MMOL/L (ref 21–32)
CREAT SERPL-MCNC: 1.74 MG/DL (ref 0.6–1.3)
DIGOXIN SERPL-MCNC: 1.1 NG/ML (ref 0.8–2)
EOSINOPHIL # BLD AUTO: 0.06 THOUSAND/ΜL (ref 0–0.61)
EOSINOPHIL NFR BLD AUTO: 1 % (ref 0–6)
ERYTHROCYTE [DISTWIDTH] IN BLOOD BY AUTOMATED COUNT: 14.1 % (ref 11.6–15.1)
GFR SERPL CREATININE-BSD FRML MDRD: 29 ML/MIN/1.73SQ M
GLUCOSE SERPL-MCNC: 174 MG/DL (ref 65–140)
HCT VFR BLD AUTO: 36.4 % (ref 34.8–46.1)
HGB BLD-MCNC: 12 G/DL (ref 11.5–15.4)
IMM GRANULOCYTES # BLD AUTO: 0.07 THOUSAND/UL (ref 0–0.2)
IMM GRANULOCYTES NFR BLD AUTO: 1 % (ref 0–2)
LYMPHOCYTES # BLD AUTO: 2.17 THOUSANDS/ΜL (ref 0.6–4.47)
LYMPHOCYTES NFR BLD AUTO: 37 % (ref 14–44)
MCH RBC QN AUTO: 34 PG (ref 26.8–34.3)
MCHC RBC AUTO-ENTMCNC: 33 G/DL (ref 31.4–37.4)
MCV RBC AUTO: 103 FL (ref 82–98)
MONOCYTES # BLD AUTO: 0.56 THOUSAND/ΜL (ref 0.17–1.22)
MONOCYTES NFR BLD AUTO: 9 % (ref 4–12)
NEUTROPHILS # BLD AUTO: 3.04 THOUSANDS/ΜL (ref 1.85–7.62)
NEUTS SEG NFR BLD AUTO: 51 % (ref 43–75)
NRBC BLD AUTO-RTO: 0 /100 WBCS
PLATELET # BLD AUTO: 148 THOUSANDS/UL (ref 149–390)
PMV BLD AUTO: 11.4 FL (ref 8.9–12.7)
POTASSIUM SERPL-SCNC: 3.6 MMOL/L (ref 3.5–5.3)
PROT SERPL-MCNC: 7.9 G/DL (ref 6.4–8.2)
RBC # BLD AUTO: 3.53 MILLION/UL (ref 3.81–5.12)
SODIUM SERPL-SCNC: 141 MMOL/L (ref 136–145)
TROPONIN I SERPL-MCNC: <0.02 NG/ML
WBC # BLD AUTO: 5.94 THOUSAND/UL (ref 4.31–10.16)

## 2018-06-16 PROCEDURE — 80162 ASSAY OF DIGOXIN TOTAL: CPT | Performed by: EMERGENCY MEDICINE

## 2018-06-16 PROCEDURE — 84484 ASSAY OF TROPONIN QUANT: CPT | Performed by: EMERGENCY MEDICINE

## 2018-06-16 PROCEDURE — 80053 COMPREHEN METABOLIC PANEL: CPT | Performed by: EMERGENCY MEDICINE

## 2018-06-16 PROCEDURE — 93005 ELECTROCARDIOGRAM TRACING: CPT

## 2018-06-16 PROCEDURE — 36415 COLL VENOUS BLD VENIPUNCTURE: CPT

## 2018-06-16 PROCEDURE — 85025 COMPLETE CBC W/AUTO DIFF WBC: CPT | Performed by: EMERGENCY MEDICINE

## 2018-06-16 PROCEDURE — 71046 X-RAY EXAM CHEST 2 VIEWS: CPT

## 2018-06-16 RX ORDER — ALLOPURINOL 300 MG/1
300 TABLET ORAL EVERY MORNING
Status: ON HOLD | COMMUNITY
End: 2018-12-14

## 2018-06-16 RX ORDER — ALLOPURINOL 300 MG/1
300 TABLET ORAL DAILY
COMMUNITY
End: 2018-06-19 | Stop reason: HOSPADM

## 2018-06-17 PROBLEM — R06.02 SOB (SHORTNESS OF BREATH): Status: ACTIVE | Noted: 2018-06-17

## 2018-06-17 PROBLEM — Z97.8 CHRONIC INDWELLING FOLEY CATHETER: Status: ACTIVE | Noted: 2018-06-17

## 2018-06-17 LAB
ANION GAP SERPL CALCULATED.3IONS-SCNC: 5 MMOL/L (ref 4–13)
ATRIAL RATE: 50 BPM
ATRIAL RATE: 51 BPM
BUN SERPL-MCNC: 27 MG/DL (ref 5–25)
CALCIUM SERPL-MCNC: 9.4 MG/DL (ref 8.3–10.1)
CHLORIDE SERPL-SCNC: 106 MMOL/L (ref 100–108)
CO2 SERPL-SCNC: 32 MMOL/L (ref 21–32)
CREAT SERPL-MCNC: 1.48 MG/DL (ref 0.6–1.3)
GFR SERPL CREATININE-BSD FRML MDRD: 35 ML/MIN/1.73SQ M
GLUCOSE SERPL-MCNC: 173 MG/DL (ref 65–140)
INR PPP: 1.62 (ref 0.86–1.17)
P AXIS: 56 DEGREES
P AXIS: 63 DEGREES
POTASSIUM SERPL-SCNC: 3.8 MMOL/L (ref 3.5–5.3)
PR INTERVAL: 216 MS
PR INTERVAL: 220 MS
PROTHROMBIN TIME: 19 SECONDS (ref 11.8–14.2)
QRS AXIS: 4 DEGREES
QRS AXIS: 8 DEGREES
QRSD INTERVAL: 100 MS
QRSD INTERVAL: 94 MS
QT INTERVAL: 448 MS
QT INTERVAL: 470 MS
QTC INTERVAL: 412 MS
QTC INTERVAL: 428 MS
SODIUM SERPL-SCNC: 143 MMOL/L (ref 136–145)
T WAVE AXIS: 31 DEGREES
T WAVE AXIS: 49 DEGREES
VENTRICULAR RATE: 50 BPM
VENTRICULAR RATE: 51 BPM

## 2018-06-17 PROCEDURE — G8978 MOBILITY CURRENT STATUS: HCPCS

## 2018-06-17 PROCEDURE — 97163 PT EVAL HIGH COMPLEX 45 MIN: CPT

## 2018-06-17 PROCEDURE — 99285 EMERGENCY DEPT VISIT HI MDM: CPT

## 2018-06-17 PROCEDURE — 85610 PROTHROMBIN TIME: CPT | Performed by: NURSE PRACTITIONER

## 2018-06-17 PROCEDURE — 99223 1ST HOSP IP/OBS HIGH 75: CPT | Performed by: FAMILY MEDICINE

## 2018-06-17 PROCEDURE — 94761 N-INVAS EAR/PLS OXIMETRY MLT: CPT

## 2018-06-17 PROCEDURE — G8979 MOBILITY GOAL STATUS: HCPCS

## 2018-06-17 PROCEDURE — 93010 ELECTROCARDIOGRAM REPORT: CPT | Performed by: INTERNAL MEDICINE

## 2018-06-17 PROCEDURE — 80048 BASIC METABOLIC PNL TOTAL CA: CPT | Performed by: FAMILY MEDICINE

## 2018-06-17 RX ORDER — DILTIAZEM HYDROCHLORIDE 90 MG/1
180 CAPSULE, EXTENDED RELEASE ORAL EVERY 12 HOURS SCHEDULED
Status: DISCONTINUED | OUTPATIENT
Start: 2018-06-17 | End: 2018-06-19 | Stop reason: HOSPADM

## 2018-06-17 RX ORDER — DIGOXIN 125 MCG
62.5 TABLET ORAL DAILY
Status: DISCONTINUED | OUTPATIENT
Start: 2018-06-18 | End: 2018-06-17

## 2018-06-17 RX ORDER — ALLOPURINOL 100 MG/1
100 TABLET ORAL DAILY
Status: DISCONTINUED | OUTPATIENT
Start: 2018-06-17 | End: 2018-06-19 | Stop reason: HOSPADM

## 2018-06-17 RX ORDER — DIVALPROEX SODIUM 250 MG/1
250 TABLET, EXTENDED RELEASE ORAL DAILY
Status: DISCONTINUED | OUTPATIENT
Start: 2018-06-17 | End: 2018-06-19 | Stop reason: HOSPADM

## 2018-06-17 RX ORDER — WARFARIN SODIUM 7.5 MG/1
7.5 TABLET ORAL
Status: DISCONTINUED | OUTPATIENT
Start: 2018-06-17 | End: 2018-06-19

## 2018-06-17 RX ORDER — MELATONIN
2000 DAILY
Status: DISCONTINUED | OUTPATIENT
Start: 2018-06-17 | End: 2018-06-19 | Stop reason: HOSPADM

## 2018-06-17 RX ORDER — FENOFIBRATE 48 MG/1
48 TABLET, COATED ORAL DAILY
Status: DISCONTINUED | OUTPATIENT
Start: 2018-06-17 | End: 2018-06-19 | Stop reason: HOSPADM

## 2018-06-17 RX ORDER — FERROUS SULFATE 325(65) MG
325 TABLET ORAL 2 TIMES DAILY
Status: DISCONTINUED | OUTPATIENT
Start: 2018-06-17 | End: 2018-06-19 | Stop reason: HOSPADM

## 2018-06-17 RX ORDER — WARFARIN SODIUM 5 MG/1
5 TABLET ORAL
Status: DISCONTINUED | OUTPATIENT
Start: 2018-06-17 | End: 2018-06-17

## 2018-06-17 RX ORDER — DILTIAZEM HYDROCHLORIDE 90 MG/1
180 CAPSULE, EXTENDED RELEASE ORAL EVERY 12 HOURS SCHEDULED
Status: DISCONTINUED | OUTPATIENT
Start: 2018-06-17 | End: 2018-06-17

## 2018-06-17 RX ORDER — ATORVASTATIN CALCIUM 10 MG/1
10 TABLET, FILM COATED ORAL
Status: DISCONTINUED | OUTPATIENT
Start: 2018-06-17 | End: 2018-06-19 | Stop reason: HOSPADM

## 2018-06-17 RX ORDER — CARBAMAZEPINE 100 MG/1
100 TABLET, CHEWABLE ORAL 3 TIMES DAILY
Status: DISCONTINUED | OUTPATIENT
Start: 2018-06-17 | End: 2018-06-19 | Stop reason: HOSPADM

## 2018-06-17 RX ORDER — ALBUTEROL SULFATE 2.5 MG/3ML
2.5 SOLUTION RESPIRATORY (INHALATION) EVERY 6 HOURS PRN
Status: DISCONTINUED | OUTPATIENT
Start: 2018-06-17 | End: 2018-06-19 | Stop reason: HOSPADM

## 2018-06-17 RX ORDER — LEVOTHYROXINE SODIUM 88 UG/1
88 TABLET ORAL
Status: DISCONTINUED | OUTPATIENT
Start: 2018-06-17 | End: 2018-06-19 | Stop reason: HOSPADM

## 2018-06-17 RX ORDER — ACETAMINOPHEN 325 MG/1
500 TABLET ORAL EVERY 6 HOURS PRN
Status: DISCONTINUED | OUTPATIENT
Start: 2018-06-17 | End: 2018-06-19 | Stop reason: HOSPADM

## 2018-06-17 RX ORDER — BUPROPION HYDROCHLORIDE 150 MG/1
150 TABLET, EXTENDED RELEASE ORAL DAILY
Status: DISCONTINUED | OUTPATIENT
Start: 2018-06-17 | End: 2018-06-19 | Stop reason: HOSPADM

## 2018-06-17 RX ORDER — DILTIAZEM HYDROCHLORIDE 60 MG/1
180 CAPSULE, EXTENDED RELEASE ORAL ONCE
Status: DISCONTINUED | OUTPATIENT
Start: 2018-06-17 | End: 2018-06-19 | Stop reason: HOSPADM

## 2018-06-17 RX ORDER — DIGOXIN 125 MCG
125 TABLET ORAL DAILY
Status: DISCONTINUED | OUTPATIENT
Start: 2018-06-17 | End: 2018-06-17

## 2018-06-17 RX ORDER — POTASSIUM CHLORIDE 20 MEQ/1
20 TABLET, EXTENDED RELEASE ORAL DAILY
Status: DISCONTINUED | OUTPATIENT
Start: 2018-06-17 | End: 2018-06-19 | Stop reason: HOSPADM

## 2018-06-17 RX ORDER — ASPIRIN 81 MG/1
81 TABLET ORAL DAILY
Status: DISCONTINUED | OUTPATIENT
Start: 2018-06-17 | End: 2018-06-19 | Stop reason: HOSPADM

## 2018-06-17 RX ADMIN — METOPROLOL TARTRATE 75 MG: 50 TABLET ORAL at 01:42

## 2018-06-17 RX ADMIN — CARBAMAZEPINE 100 MG: 100 TABLET, CHEWABLE ORAL at 21:05

## 2018-06-17 RX ADMIN — FERROUS SULFATE TAB 325 MG (65 MG ELEMENTAL FE) 325 MG: 325 (65 FE) TAB at 08:59

## 2018-06-17 RX ADMIN — ATORVASTATIN CALCIUM 10 MG: 10 TABLET, FILM COATED ORAL at 17:10

## 2018-06-17 RX ADMIN — FLUTICASONE PROPIONATE AND SALMETEROL 1 PUFF: 50; 250 POWDER RESPIRATORY (INHALATION) at 21:05

## 2018-06-17 RX ADMIN — CARBAMAZEPINE 100 MG: 100 TABLET, CHEWABLE ORAL at 17:10

## 2018-06-17 RX ADMIN — FENOFIBRATE 48 MG: 48 TABLET, FILM COATED ORAL at 08:59

## 2018-06-17 RX ADMIN — POTASSIUM CHLORIDE 20 MEQ: 1500 TABLET, EXTENDED RELEASE ORAL at 08:59

## 2018-06-17 RX ADMIN — FLUTICASONE PROPIONATE AND SALMETEROL 1 PUFF: 50; 250 POWDER RESPIRATORY (INHALATION) at 09:01

## 2018-06-17 RX ADMIN — METOPROLOL TARTRATE 75 MG: 50 TABLET ORAL at 21:09

## 2018-06-17 RX ADMIN — ALLOPURINOL 100 MG: 100 TABLET ORAL at 09:00

## 2018-06-17 RX ADMIN — BUPROPION HYDROCHLORIDE 150 MG: 150 TABLET, FILM COATED, EXTENDED RELEASE ORAL at 09:00

## 2018-06-17 RX ADMIN — FERROUS SULFATE TAB 325 MG (65 MG ELEMENTAL FE) 325 MG: 325 (65 FE) TAB at 17:10

## 2018-06-17 RX ADMIN — FUROSEMIDE 60 MG: 40 TABLET ORAL at 08:59

## 2018-06-17 RX ADMIN — WARFARIN SODIUM 7.5 MG: 7.5 TABLET ORAL at 17:10

## 2018-06-17 RX ADMIN — DILTIAZEM HYDROCHLORIDE 180 MG: 90 CAPSULE, EXTENDED RELEASE ORAL at 12:39

## 2018-06-17 RX ADMIN — METOPROLOL TARTRATE 75 MG: 50 TABLET ORAL at 09:05

## 2018-06-17 RX ADMIN — ASPIRIN 81 MG: 81 TABLET, COATED ORAL at 08:59

## 2018-06-17 RX ADMIN — CARBAMAZEPINE 100 MG: 100 TABLET, CHEWABLE ORAL at 09:00

## 2018-06-17 RX ADMIN — LEVOTHYROXINE SODIUM 88 MCG: 88 TABLET ORAL at 07:07

## 2018-06-17 RX ADMIN — DIVALPROEX SODIUM 250 MG: 250 TABLET, FILM COATED, EXTENDED RELEASE ORAL at 08:59

## 2018-06-17 RX ADMIN — CHOLECALCIFEROL TAB 25 MCG (1000 UNIT) 2000 UNITS: 25 TAB at 09:00

## 2018-06-17 NOTE — ED NOTES
Call received from Pt daughter Rebeccahuy Dotson who reports she believes her mother came to ED because of her O2 Saturation  Zoran Dotson states "she usually say she needs to go to the ED because she does not feel well and says she feels like she has a cold heart "  Zoran Dotson reports pt will get O2 and then report she feels better  As per Zoran Dotson she is supposed to be on oxygen at the personal care home however they have no obtain O2 tanks yets  Dr Elise Garvin made aware        Renuka Serra RN  06/16/18 0685

## 2018-06-17 NOTE — PHYSICAL THERAPY NOTE
PT Evaluation (19min)  (13:00-13:19)    Past Medical History:   Diagnosis Date    Atrial fibrillation ([facility] )     Brain aneurysm     CAD (coronary artery disease)     Cardiac disease     had stents 12 years ago in [address] [place] [address] CHF (congestive heart failure) (HCC)     CKD (chronic kidney disease)     COPD (chronic obstructive pulmonary disease) (HCC)     Dementia     Dementia     Disease of thyroid gland     Hyperlipidemia     Hyperlipidemia     Hypertension     MI (myocardial infarction) ([facility] )     Migraine     Renal disorder     Seizures ([facility] )     Stroke ([facility] )         06/17/18 1319   Note Type   Note type Eval only   Pain Assessment   Pain Assessment No/denies pain   Home Living   Type of Home Assisted living  ([facility])   Home Layout Two level  (bedroom on 2nd level c 10 steps to access)   Home Equipment Walker;Cane   Prior Function   Level of [place] Needs assistance with ADLs and functional mobility  (ambulates c RW )   Receives Help From Personal care attendant  (PCF staff prn)   ADL Assistance Needs assistance   IADLs Needs assistance   Falls in the last 6 months 1 to 4   Vocational Retired   Restrictions/Precautions   Other Precautions Cognitive; Chair Alarm; Bed Alarm; Fall Risk   General   Additional Pertinent History pt presents to [facility] c SOB  PT consulted for mobility + d/c planning  up c (A)  Family/Caregiver Present No   Cognition   Orientation Level Oriented to person   RUE Assessment   RUE Assessment WFL   LUE Assessment   LUE Assessment WFL   RLE Assessment   RLE Assessment WFL  (4-/5)   LLE Assessment   LLE Assessment WFL  (4-/5)   Coordination   Sensation WFL   Transfers   Sit to Stand 5  Supervision   Additional items Armrests; Verbal cues; Increased time required   Stand to Sit 5  Supervision   Additional items Verbal cues;Armrests; Increased time required   Toilet transfer 5  Supervision   Additional items Armrests;Raised toilet seat; Increased time required;Verbal cues   Ambulation/Elevation   Gait pattern Narrow JENNYFER; Decreased foot clearance; Inconsistent randi   Gait Assistance 4  Minimal assist   Additional items Assist x 1;Verbal cues   Assistive Device Rolling walker   Distance 15' + 140' c seated rest for toileting   Stair Management Assistance Not tested  (1* SOB/fatigue)   Balance   Static Sitting Good   Dynamic Sitting Good   Static Standing Fair -   Dynamic Standing Fair -   Ambulatory Fair -   Endurance Deficit   Endurance Deficit Yes   Endurance Deficit Description SOB; SaO2 90-91% on RA throughout session  Activity Tolerance   Activity Tolerance Patient limited by fatigue   Nurse Made Aware Skye   Assessment   Prognosis Good   Problem List Decreased strength;Decreased endurance; Impaired balance;Decreased mobility; Decreased cognition;Decreased safety awareness   Assessment pt is a 73y/o f who presents to [facility] c SOB  PMH significant for dementia, COPD, CHF, seizure disorder, + CVA/ruptured aneurysm  at baseline, pt ambulates mod (I) c RW  resides at [facility] c 10 steps to access bedroom  currently requires min (A)x1 for OOB mobility tasks 2* deficits in strength, balance, gait quality, cognition, + activity tolerance noted in PT exam above  Barthel Index 50/100  min verbal cues required for safety c transitions  ambulated 15' + 140' c RW min (A)x1 c seated rest required 2* SOB  stairs deferred at this time 2* SOB/fatigue  SaO2 90-91% on RA throughout session c pt in no apparent distress  would benefit from skilled PT to maximize functional mobility + improve quality of life  upon d/c, if facility unable to accept pt at current status, recommend STR  PT eval of high complexity 2* unstable med status c pt requiring ongoing medical management 2* SOB  pt c significantly impaired cognition requiring cues for safety espeically c ambulation  presents c mobility deficits above requiring min (A)x1 to decrease fall risk   SOB noted c mobility tasks despite SaO2 90-91% on RA  resides at personal care facility c 10 steps to access bedroom  Barriers to Discharge Inaccessible home environment   Goals   Patient Goals "to get better"  [facility] Expiration Date 06/27/18   Short Term Goal #1 1  increase strength 1/2 grade to improve overall functional mobility, 2  perform transfers mod (I) to safely perform ADLs, 3  ambulate >150' c RW mod (I) c SaO2 >92% to safely navigate [facility], 4  negotiate 10 stairs mod (I) to safely access bedroom at [facility]   Plan   Treatment/Interventions Functional transfer training;LE strengthening/ROM; Elevations; Therapeutic exercise; Endurance training;Patient/family training;Bed mobility;Gait training;Spoke to nursing   PT Frequency 5x/wk   Recommendation   Recommendation Short-term skilled PT; Home PT; Other (Comment)  (STR vs PAULO c PT pending PAULO's ability to cont care for pt)   PT - OK to Discharge Yes   Barthel Index   Feeding 10   Bathing 0   Grooming Score 0   Dressing Score 5   Bladder Score 0   Bowels Score 10   Toilet Use Score 5   Transfers (Bed/Chair) Score 10   Mobility (Level Surface) Score 10   Stairs Score 0   Barthel Index Score 50     Urmila Graff, PT

## 2018-06-17 NOTE — ED PROVIDER NOTES
History  Chief Complaint   Patient presents with    Shortness of Breath     Pt co SOB and not feeling well for the past few days  As per EMS was seen at PCP this week for low O2 sat and was put on 3 L of oxygen  64-year-old female presents today complaining of not feeling well  Per EMS she was seen by her PCP this week for low O2 sat was started on nasal cannula oxygen, however the patient was not on oxygen when EMS picked her up  History provided by:  Patient and EMS personnel  History limited by:  Dementia  Shortness of Breath   Severity:  Unable to specify  Onset quality:  Unable to specify  Timing:  Unable to specify  Progression:  Unable to specify  Chronicity:  Recurrent  Relieved by:  Oxygen  Worsened by:  Nothing  Ineffective treatments:  None tried  Associated symptoms: no abdominal pain, no chest pain, no claudication, no cough, no diaphoresis, no ear pain, no fever, no headaches, no hemoptysis, no neck pain, no PND, no rash, no sore throat, no sputum production, no syncope, no swollen glands, no vomiting and no wheezing    Risk factors: no recent alcohol use, no family hx of DVT, no hx of PE/DVT and no prolonged immobilization        Prior to Admission Medications   Prescriptions Last Dose Informant Patient Reported? Taking?    Cholecalciferol (VITAMIN D) 2000 units CAPS   Yes Yes   Sig: Take 1 capsule by mouth daily     Levothyroxine Sodium 88 MCG CAPS   Yes Yes   Sig: Take 88 mcg by mouth every morning     Sennosides-Docusate Sodium (SENNA PLUS PO)   Yes No   Sig: Take by mouth daily as needed   acetaminophen (TYLENOL) 500 mg tablet   Yes No   Sig: Take 500 mg by mouth every 6 (six) hours as needed for mild pain   albuterol (2 5 mg/3 mL) 0 083 % nebulizer solution   Yes No   Sig: Take 2 5 mg by nebulization every 6 (six) hours as needed for wheezing   albuterol (PROVENTIL HFA,VENTOLIN HFA) 90 mcg/act inhaler   Yes Yes   Sig: Inhale 2 puffs every 6 (six) hours as needed for wheezing allopurinol (ZYLOPRIM) 100 mg tablet   Yes Yes   Sig: Take 100 mg by mouth daily   allopurinol (ZYLOPRIM) 300 mg tablet   Yes Yes   Sig: Take 300 mg by mouth daily   aspirin (ECOTRIN LOW STRENGTH) 81 mg EC tablet   Yes Yes   Sig: Take 81 mg by mouth daily   atorvastatin (LIPITOR) 10 mg tablet   Yes Yes   Sig: Take 10 mg by mouth daily   buPROPion (WELLBUTRIN SR) 150 mg 12 hr tablet   Yes Yes   Sig: Take 150 mg by mouth daily   carBAMazepine (TEGretol) 100 mg chewable tablet   Yes Yes   Sig: Chew 100 mg 3 (three) times a day   digoxin (LANOXIN) 0 125 mg tablet   No Yes   Sig: Take 1 tablet (125 mcg total) by mouth daily   diltiazem (CARDIZEM SR) 90 mg 12 hr capsule   No Yes   Sig: Take 2 capsules (180 mg total) by mouth every 12 (twelve) hours   divalproex sodium (DEPAKOTE ER) 250 mg 24 hr tablet   No Yes   Sig: Take 1 tablet (250 mg total) by mouth daily   enoxaparin (LOVENOX) 100 mg/mL   No No   Sig: Inject 0 9 mL (90 mg total) under the skin every 24 hours for 5 days   fenofibrate micronized (LOFIBRA) 67 MG capsule   Yes Yes   Sig: Take 67 mg by mouth every morning before breakfast   ferrous sulfate 325 (65 Fe) mg tablet   Yes Yes   Sig: Take 325 mg by mouth 2 (two) times a day   fluticasone furoate-vilanterol (BREO ELLIPTA)   Yes Yes   Sig: Inhale 1 puff daily   furosemide (LASIX) 20 mg tablet   No Yes   Sig: Take 3 tablets (60 mg total) by mouth daily   metoprolol tartrate 75 MG TABS   No Yes   Sig: Take 1 tablet (75 mg total) by mouth every 12 (twelve) hours   nitroglycerin (NITROSTAT) 0 4 mg SL tablet   No No   Sig: Place 1 tablet (0 4 mg total) under the tongue every 5 (five) minutes as needed for chest pain   potassium chloride (K-DUR,KLOR-CON) 20 mEq tablet   No Yes   Sig: Take 1 tablet (20 mEq total) by mouth daily   warfarin (COUMADIN) 5 mg tablet   No Yes   Sig: One tablet 5 mg daily at dinner until changed by doctor      Facility-Administered Medications: None       Past Medical History:   Diagnosis Date    Atrial fibrillation ([facility] )     Brain aneurysm     CAD (coronary artery disease)     Cardiac disease     had stents 12 years ago in [facility]    [facility]     CKD (chronic kidney disease)     COPD [facility]chronic obstructive pulmonary disease) ([facility])     Dementia     Dementia     Disease of thyroid gland     Hyperlipidemia     Hyperlipidemia     Hypertension     MI (myocardial infarction) ([facility] )     Migraine     Renal disorder     Seizures ([facility] )     Stroke ([facility] )        Past Surgical History:   Procedure Laterality Date    APPENDECTOMY      BLADDER TUMOR EXCISION      BRAIN SURGERY      CARDIAC SURGERY      CORONARY STENT PLACEMENT      5 stents    EYE SURGERY      MANDIBLE FRACTURE SURGERY      TONSILLECTOMY      TUBAL LIGATION      UTERINE FIBROID SURGERY         Family History   Problem Relation Age of Onset    Heart disease Father      I have reviewed and agree with the history as documented  Social History   Substance Use Topics    Smoking status: Former Smoker    Smokeless tobacco: Never Used    Alcohol use No        Review of Systems   Unable to perform ROS: Dementia   Constitutional: Negative for diaphoresis and fever  HENT: Negative for ear pain and sore throat  Respiratory: Positive for shortness of breath  Negative for cough, hemoptysis, sputum production and wheezing  Cardiovascular: Negative for chest pain, claudication, syncope and PND  Gastrointestinal: Negative for abdominal pain and vomiting  Musculoskeletal: Negative for neck pain  Skin: Negative for rash  Neurological: Negative for headaches  Physical Exam  Physical Exam   Constitutional: She appears well-developed and well-nourished  HENT:   Head: Normocephalic and atraumatic  Eyes: EOM are normal  Pupils are equal, round, and reactive to light  Neck: Normal range of motion  Cardiovascular: An irregularly irregular rhythm present  Bradycardia present  Pulmonary/Chest: Effort normal    Mildly decreased breath sounds in all lung fields  Abdominal: Soft  Bowel sounds are normal    Nursing note and vitals reviewed        Vital Signs  ED Triage Vitals [06/16/18 1858]   Temperature Pulse Respirations Blood Pressure SpO2   97 6 °F (36 4 °C) 56 18 149/68 93 %      Temp Source Heart Rate Source Patient Position - Orthostatic VS BP Location FiO2 (%)   Oral Monitor Sitting Right arm --      Pain Score       No Pain           Vitals:    06/16/18 2200 06/16/18 2300 06/17/18 0029 06/17/18 0142   BP: 162/74 164/97 (!) 193/79 154/70   Pulse: (!) 52 (!) 52 55 56   Patient Position - Orthostatic VS: Sitting Sitting Lying Lying       Visual Acuity      ED Medications  Medications   acetaminophen (TYLENOL) tablet 488 mg (not administered)   albuterol inhalation solution 2 5 mg (not administered)   allopurinol (ZYLOPRIM) tablet 100 mg (not administered)   aspirin (ECOTRIN LOW STRENGTH) EC tablet 81 mg (not administered)   atorvastatin (LIPITOR) tablet 10 mg (not administered)   buPROPion (WELLBUTRIN SR) 12 hr tablet 150 mg (not administered)   carBAMazepine (TEGretol) chewable tablet 100 mg (not administered)   cholecalciferol (VITAMIN D3) tablet 2,000 Units (not administered)   digoxin (LANOXIN) tablet 125 mcg (not administered)   diltiazem (CARDIZEM SR) 12 hr capsule 180 mg (not administered)   divalproex sodium (DEPAKOTE ER) 24 hr tablet 250 mg (not administered)   fenofibrate (TRICOR) tablet 48 mg (not administered)   ferrous sulfate tablet 325 mg (not administered)   fluticasone-salmeterol (ADVAIR) 250-50 mcg/dose inhaler 1 puff (1 puff Inhalation Not Given 6/17/18 0155)   furosemide (LASIX) tablet 60 mg (not administered)   levothyroxine tablet 88 mcg (not administered)   metoprolol tartrate (LOPRESSOR) tablet 75 mg (75 mg Oral Given 6/17/18 0142)   potassium chloride (K-DUR,KLOR-CON) CR tablet 20 mEq (not administered)   warfarin (COUMADIN) tablet 5 mg (not administered) Diagnostic Studies  Results Reviewed     Procedure Component Value Units Date/Time    Digoxin level [[id number]]  (Normal) Collected:  06/16/18 1954    Lab Status:  Final result Specimen:  Blood from Arm, Left Updated:  06/16/18 2214     Digoxin Lvl 1 1 ng/mL     Comprehensive metabolic panel [[id number]]  (Abnormal) Collected:  06/16/18 1954    Lab Status:  Final result Specimen:  Blood from Arm, Left Updated:  06/16/18 2019     Sodium 141 mmol/L      Potassium 3 6 mmol/L      Chloride 104 mmol/L      CO2 30 mmol/L      Anion Gap 7 mmol/L      BUN 30 (H) mg/dL      Creatinine 1 74 (H) mg/dL      Glucose 174 (H) mg/dL      Calcium 9 4 mg/dL      AST 28 U/L      ALT 44 U/L      Alkaline Phosphatase 68 U/L      Total Protein 7 9 g/dL      Albumin 3 6 g/dL      Total Bilirubin 0 30 mg/dL      eGFR 29 ml/min/1 73sq m     Narrative:         National Kidney Disease Education Program recommendations are as follows:  GFR calculation is accurate only with a steady state creatinine  Chronic Kidney disease less than 60 ml/min/1 73 sq  meters  Kidney failure less than 15 ml/min/1 73 sq  meters      Troponin I [[id number]]  (Normal) Collected:  06/16/18 1953    Lab Status:  Final result Specimen:  Blood from Arm, Left Updated:  06/16/18 2018     Troponin I <0 02 ng/mL     CBC and differential [[id number]]  (Abnormal) Collected:  06/16/18 1953    Lab Status:  Final result Specimen:  Blood from Arm, Left Updated:  06/16/18 1959     WBC 5 94 Thousand/uL      RBC 3 53 (L) Million/uL      Hemoglobin 12 0 g/dL      Hematocrit 36 4 %       (H) fL      MCH 34 0 pg      MCHC 33 0 g/dL      RDW 14 1 %      MPV 11 4 fL      Platelets 352 (L) Thousands/uL      nRBC 0 /100 WBCs      Neutrophils Relative 51 %      Immat GRANS % 1 %      Lymphocytes Relative 37 %      Monocytes Relative 9 %      Eosinophils Relative 1 %      Basophils Relative 1 %      Neutrophils Absolute 3 04 Thousands/µL      Immature Grans Absolute 0 07 Thousand/uL Lymphocytes Absolute 2 17 Thousands/µL      Monocytes Absolute 0 56 Thousand/µL      Eosinophils Absolute 0 06 Thousand/µL      Basophils Absolute 0 04 Thousands/µL                  X-ray chest 2 views    (Results Pending)              Procedures  ECG 12 Lead Documentation  Date/Time: 6/17/2018 2:09 AM  Performed by: Obi Esquivel  Authorized by: Obi Esquivel     Indications / Diagnosis:  Shortness of breath  ECG reviewed by me, the ED Provider: yes    Patient location:  ED  Previous ECG:     Previous ECG:  Compared to current  Comments:      Atrial fibrillation with slow ventricular response  Repolarization abnormalities consistent with digoxin therapy  Phone Contacts  ED Phone Contact    ED Course                               MDM  Number of Diagnoses or Management Options  Diagnosis management comments: 2:10 AM  Late entry - nurse spoke with patient's daughter who reports that this episode has happened several times over the last year  Patient complains of not feeling well, is sent to the hospital and started on oxygen, where she feels better  She is sent home with instructions to get oxygen at the nursing home, but for some reason they are unable to get portable oxygen for her and she ends up back in the hospital   The nurse then called the nursing home for me and confirmed that the patient does not have portable oxygen there  Will need to admit patient for hypoxia and case management evaluation for oxygen at the nursing home or different placement         Amount and/or Complexity of Data Reviewed  Clinical lab tests: ordered and reviewed  Tests in the radiology section of CPT®: ordered and reviewed  Tests in the medicine section of CPT®: reviewed and ordered  Decide to obtain previous medical records or to obtain history from someone other than the patient: yes  Review and summarize past medical records: yes  Discuss the patient with other providers: yes  Independent visualization of images, tracings, or specimens: yes    Risk of Complications, Morbidity, and/or Mortality  Presenting problems: high  Diagnostic procedures: high  Management options: high    Patient Progress  Patient progress: stable    CritCare Time    Disposition  Final diagnoses:   Shortness of breath   Atrial fibrillation with slow ventricular response ([address] )   Hypoxia     Time reflects when diagnosis was documented in both MDM as applicable and the Disposition within this note     Time User Action Codes Description Comment    6/17/2018  2:12 AM Glenisshraddhashannan Stanleyjudeagi Add [R06 02] Shortness of breath     6/17/2018  2:12 AM Holley Osgood [I48 91] Atrial fibrillation with slow ventricular response ([facility] )     6/17/2018  2:12 AM Elizabeth Baueragi Add [R09 02] Hypoxia       ED Disposition     ED Disposition Condition Comment    Admit  Case was discussed with HANANE and the patient's admission status was agreed to be Admission Status: observation status to the service of Dr Tanya Mena           Follow-up Information    None         Current Discharge Medication List      CONTINUE these medications which have NOT CHANGED    Details   albuterol (PROVENTIL HFA,VENTOLIN HFA) 90 mcg/act inhaler Inhale 2 puffs every 6 (six) hours as needed for wheezing      !! allopurinol (ZYLOPRIM) 100 mg tablet Take 100 mg by mouth daily      !! allopurinol (ZYLOPRIM) 300 mg tablet Take 300 mg by mouth daily      aspirin (ECOTRIN LOW STRENGTH) 81 mg EC tablet Take 81 mg by mouth daily      atorvastatin (LIPITOR) 10 mg tablet Take 10 mg by mouth daily      buPROPion (WELLBUTRIN SR) 150 mg 12 hr tablet Take 150 mg by mouth daily      carBAMazepine (TEGretol) 100 mg chewable tablet Chew 100 mg 3 (three) times a day      Cholecalciferol (VITAMIN D) 2000 units CAPS Take 1 capsule by mouth daily        digoxin (LANOXIN) 0 125 mg tablet Take 1 tablet (125 mcg total) by mouth daily  Qty: 30 tablet, Refills: 0    Associated Diagnoses: Atrial fibrillation (HCC)      diltiazem (CARDIZEM SR) 90 mg 12 hr capsule Take 2 capsules (180 mg total) by mouth every 12 (twelve) hours  Qty: 30 capsule, Refills: 0    Associated Diagnoses: Atrial fibrillation (HCC)      divalproex sodium (DEPAKOTE ER) 250 mg 24 hr tablet Take 1 tablet (250 mg total) by mouth daily  Qty: 30 tablet, Refills: 0    Associated Diagnoses: Mood disorder as late effect of cerebrovascular accident (CVA)      fenofibrate micronized (LOFIBRA) 67 MG capsule Take 67 mg by mouth every morning before breakfast      ferrous sulfate 325 (65 Fe) mg tablet Take 325 mg by mouth 2 (two) times a day      fluticasone furoate-vilanterol (BREO ELLIPTA) Inhale 1 puff daily      furosemide (LASIX) 20 mg tablet Take 3 tablets (60 mg total) by mouth daily  Qty: 30 tablet, Refills: 0    Associated Diagnoses: Chronic diastolic congestive heart failure (HCC)      Levothyroxine Sodium 88 MCG CAPS Take 88 mcg by mouth every morning        metoprolol tartrate 75 MG TABS Take 1 tablet (75 mg total) by mouth every 12 (twelve) hours  Qty: 60 tablet, Refills: 0    Associated Diagnoses: Atrial fibrillation (HCC)      potassium chloride (K-DUR,KLOR-CON) 20 mEq tablet Take 1 tablet (20 mEq total) by mouth daily  Qty: 15 tablet, Refills: 0    Associated Diagnoses: Acute on chronic diastolic congestive heart failure (HCC)      warfarin (COUMADIN) 5 mg tablet One tablet 5 mg daily at dinner until changed by doctor  Qty: 30 tablet, Refills: 0    Associated Diagnoses: Atrial fibrillation (HCC)      acetaminophen (TYLENOL) 500 mg tablet Take 500 mg by mouth every 6 (six) hours as needed for mild pain      albuterol (2 5 mg/3 mL) 0 083 % nebulizer solution Take 2 5 mg by nebulization every 6 (six) hours as needed for wheezing      enoxaparin (LOVENOX) 100 mg/mL Inject 0 9 mL (90 mg total) under the skin every 24 hours for 5 days  Qty: 4 5 mL, Refills: 0    Associated Diagnoses: Atrial fibrillation (HCC)      nitroglycerin (NITROSTAT) 0 4 mg SL tablet Place 1 tablet (0 4 mg total) under the tongue every 5 (five) minutes as needed for chest pain  Qty: 90 tablet, Refills: 0    Associated Diagnoses: Chronic diastolic congestive heart failure (HCC)      Sennosides-Docusate Sodium (SENNA PLUS PO) Take by mouth daily as needed       !! - Potential duplicate medications found  Please discuss with provider  No discharge procedures on file      ED Provider  Electronically Signed by           Danette Montanez DO  06/17/18 1226

## 2018-06-17 NOTE — H&P
H&P- Kathreen Fothergill [birth year], 71 y o  female MRN: [id number]    Unit/Bed#: -01 Encounter: [id number]    Primary Care Provider: Cabrera Tyler DO   Date and time admitted to hospital: 6/16/2018  6:54 PM        * SOB (shortness of breath)   Assessment & Plan    Present on admission, pre-hospital   Patient has history of COPD, Congestive Heart Failure O2 dependent  Currently living at [facility] facility  Family report the facility is not able to coordinate for patient to receive home oxygen at this time  No acute distress noted  Respiratory protocol with p r n  neb treatments  O2 at 2 L  Case management consulted to assist with coordination of O2 to be transferred or delivered to patient's personal care home  Stage 3 chronic kidney disease   Assessment & Plan    Acute on chronic  Monitor nephrotoxin agents  Monitor creatinine  Dementia   Assessment & Plan    Patient is alert  Continue to redirect  Monitor for safety  Chronic diastolic congestive heart failure (HCC)   Assessment & Plan    Chronic, O2 dependent  For dyspnea  Monitor intake and output, low-sodium diet encouraged  COPD (chronic obstructive pulmonary disease) ([facility] [address] [address] )   Assessment & Plan    O2 dependent  No acute exacerbation noted  Continue home inhalers  Respiratory protocol with p r n  neb treatments  HTN (hypertension)   Assessment & Plan    Blood pressure elevated on admission  Continue metoprolol, Cardizem and Lasix  VTE Prophylaxis: Warfarin (Coumadin)  / sequential compression device   Code Status: DNR level 3  POLST: There is no POLST form on file for this patient (pre-hospital)  Discussion with family:  No family at bedside    Anticipated Length of Stay:  Patient will be admitted on an Observation basis with an anticipated length of stay of  less 2 midnights     Justification for Hospital Stay:  Patient has history of Congestive Heart Failure O2 dependent resides at a personal care facility unable to 10 oxygen  Total Time for Visit, including Counseling / Coordination of Care: 45 minutes  Greater than 50% of this total time spent on direct patient counseling and coordination of care  Chief Complaint:   Shortness of breath    History of Present Illness: Yue Painting is a 71 y o  female history of renal disorder, dementia, AFib COPD, Congestive Heart Failure chronic O2 2 liters, CAD, AFib who presents with chief complaint of shortness of breath From personal care home, [facility] for O2  Per ED record, patient he has history of COPD, Congestive Heart Failure on chronic O2 unfortunately personal care home has not been able to obtain O2 for patient  She is here for O2 supplementation  Review of Systems:    Review of Systems   Unable to perform ROS: Dementia   Respiratory: Positive for shortness of breath  All other systems reviewed and are negative  Past Medical and Surgical History:     Past Medical History:   Diagnosis Date    Atrial fibrillation [facility] [address] [facility]     Brain aneurysm     CAD (coronary artery disease)     Cardiac disease     had stents 12 years ago in [facility]    [facility]     CKD (chronic kidney disease)     COPD (chronic obstructive pulmonary disease) (HCC)     Dementia     Dementia     Disease of thyroid gland     Hyperlipidemia     Hyperlipidemia     Hypertension     MI (myocardial infarction) [facility]     Migraine     Renal disorder     Seizures ([facility] )     [facility]        Past Surgical History:   Procedure Laterality Date    APPENDECTOMY      BLADDER TUMOR EXCISION      BRAIN SURGERY      CARDIAC SURGERY      CORONARY STENT PLACEMENT      5 stents    EYE SURGERY      MANDIBLE FRACTURE SURGERY      TONSILLECTOMY      TUBAL LIGATION      UTERINE FIBROID SURGERY         Meds/Allergies:    Prior to Admission medications    Medication Sig Start Date End Date Taking?  Authorizing Provider albuterol (PROVENTIL HFA,VENTOLIN HFA) 90 mcg/act inhaler Inhale 2 puffs every 6 (six) hours as needed for wheezing   Yes Historical Provider, MD   allopurinol (ZYLOPRIM) 100 mg tablet Take 100 mg by mouth daily   Yes Historical Provider, MD   allopurinol (ZYLOPRIM) 300 mg tablet Take 300 mg by mouth daily   Yes Historical Provider, MD   aspirin (ECOTRIN LOW STRENGTH) 81 mg EC tablet Take 81 mg by mouth daily   Yes Historical Provider, MD   atorvastatin (LIPITOR) 10 mg tablet Take 10 mg by mouth daily   Yes Historical Provider, MD   buPROPion (WELLBUTRIN SR) 150 mg 12 hr tablet Take 150 mg by mouth daily   Yes Historical Provider, MD   carBAMazepine (TEGretol) 100 mg chewable tablet Chew 100 mg 3 (three) times a day   Yes Historical Provider, MD   Cholecalciferol (VITAMIN D) 2000 units CAPS Take 1 capsule by mouth daily     Yes Historical Provider, MD   digoxin (LANOXIN) 0 125 mg tablet Take 1 tablet (125 mcg total) by mouth daily 5/12/18  Yes Momo Resendez MD   diltiazem (CARDIZEM SR) 90 mg 12 hr capsule Take 2 capsules (180 mg total) by mouth every 12 (twelve) hours 5/12/18  Yes Momo Resendez MD   divalproex sodium (DEPAKOTE ER) 250 mg 24 hr tablet Take 1 tablet (250 mg total) by mouth daily 4/19/18  Yes Tyler Vasques MD   fenofibrate micronized (LOFIBRA) 67 MG capsule Take 67 mg by mouth every morning before breakfast   Yes Historical Provider, MD   ferrous sulfate 325 (65 Fe) mg tablet Take 325 mg by mouth 2 (two) times a day   Yes Historical Provider, MD   fluticasone furoate-vilanterol (BREO ELLIPTA) Inhale 1 puff daily   Yes Historical Provider, MD   furosemide (LASIX) 20 mg tablet Take 3 tablets (60 mg total) by mouth daily 5/13/18  Yes Momo Resendez MD   Levothyroxine Sodium 88 MCG CAPS Take 88 mcg by mouth every morning     Yes Historical Provider, MD   metoprolol tartrate 75 MG TABS Take 1 tablet (75 mg total) by mouth every 12 (twelve) hours 5/12/18  Yes Momo Resendez MD   potassium chloride (K-DUR,KLOR-CON) 20 mEq tablet Take 1 tablet (20 mEq total) by mouth daily 4/18/18  Yes Ralph Palacois MD   warfarin (COUMADIN) 5 mg tablet One tablet 5 mg daily at dinner until changed by doctor 5/12/18  Yes Geno Isaac MD   acetaminophen (TYLENOL) 500 mg tablet Take 500 mg by mouth every 6 (six) hours as needed for mild pain    Historical Provider, MD   albuterol (2 5 mg/3 mL) 0 083 % nebulizer solution Take 2 5 mg by nebulization every 6 (six) hours as needed for wheezing    Historical Provider, MD   enoxaparin (LOVENOX) 100 mg/mL Inject 0 9 mL (90 mg total) under the skin every 24 hours for 5 days 5/13/18 5/18/18  Geno Isaac MD   nitroglycerin (NITROSTAT) 0 4 mg SL tablet Place 1 tablet (0 4 mg total) under the tongue every 5 (five) minutes as needed for chest pain 5/12/18   Geno Isaac MD   Sennosides-Docusate Sodium (SENNA PLUS PO) Take by mouth daily as needed    Historical Provider, MD     I have reveiwed home medications using records provided by [facility]  Allergies:    Allergies   Allergen Reactions    Ramipril Anaphylaxis    Spiriva Handihaler [Tiotropium Bromide Monohydrate] Swelling    Penicillins        Social History:     Marital Status:    Occupation:  Retired  Patient Pre-hospital Living Situation:  Nursing facility  Patient Pre-hospital Level of Mobility:  K  Patient Pre-hospital Diet Restrictions:  None  Substance Use History:   History   Alcohol Use No     History   Smoking Status    Former Smoker   Smokeless Tobacco    Never Used     History   Drug Use No       Family History:    non-contributory    Physical Exam:     Vitals:   Blood Pressure: 141/64 (06/17/18 0350)  Pulse: (!) 47 (06/17/18 0350)  Temperature: 98 4 °F (36 9 °C) (06/17/18 0029)  Temp Source: Oral (06/17/18 0029)  Respirations: 18 (06/17/18 0350)  Height: 5' 2" (157 5 cm) (06/17/18 0029)  Weight - Scale: 84 kg (185 lb 3 oz) (06/17/18 0029)  SpO2: 96 % (06/17/18 0029)    Physical Exam Constitutional: She appears well-developed and well-nourished  HENT:   Head: Normocephalic and atraumatic  Neck: Normal range of motion  Neck supple  Cardiovascular: Normal heart sounds  An irregular rhythm present  Bradycardia present  Pulmonary/Chest: Effort normal  No accessory muscle usage  No respiratory distress  She has decreased breath sounds  She has wheezes (Faint)  She has no rales  O2 dependent, 2-3 liters cannula  Abdominal: Soft  Bowel sounds are normal  She exhibits no distension  There is no tenderness  Musculoskeletal: Normal range of motion  Neurological: She is alert  Skin: Skin is warm and dry  Psychiatric: She has a normal mood and affect  Her behavior is normal    Nursing note and vitals reviewed  Additional Data:     Lab Results: I have personally reviewed pertinent reports  Results from last 7 days  Lab Units 06/16/18 1953   WBC Thousand/uL 5 94   HEMOGLOBIN g/dL 12 0   HEMATOCRIT % 36 4   PLATELETS Thousands/uL 148*   NEUTROS PCT % 51   LYMPHS PCT % 37   MONOS PCT % 9   EOS PCT % 1       Results from last 7 days  Lab Units 06/16/18 1954   SODIUM mmol/L 141   POTASSIUM mmol/L 3 6   CHLORIDE mmol/L 104   CO2 mmol/L 30   BUN mg/dL 30*   CREATININE mg/dL 1 74*   CALCIUM mg/dL 9 4   TOTAL PROTEIN g/dL 7 9   BILIRUBIN TOTAL mg/dL 0 30   ALK PHOS U/L 68   ALT U/L 44   AST U/L 28   GLUCOSE RANDOM mg/dL 174*                   Imaging: I have personally reviewed pertinent reports  VRAD, no acute findings    X-ray chest 2 views    (Results Pending)       EKG, Pathology, and Other Studies Reviewed on Admission:   · EKG:  Non STEMI    Allscripts / Epic Records Reviewed: Yes     ** Please Note: This note has been constructed using a voice recognition system   **

## 2018-06-17 NOTE — PLAN OF CARE
DISCHARGE PLANNING     Discharge to home or other facility with appropriate resources Progressing        INFECTION - ADULT     Absence or prevention of progression during hospitalization Progressing        Knowledge Deficit     Patient/family/caregiver demonstrates understanding of disease process, treatment plan, medications, and discharge instructions Progressing        Potential for Falls     Patient will remain free of falls Progressing        RESPIRATORY - ADULT     Achieves optimal ventilation and oxygenation Progressing        SAFETY ADULT     Maintain or return to baseline ADL function Progressing     Maintain or return mobility status to optimal level Progressing

## 2018-06-17 NOTE — ASSESSMENT & PLAN NOTE
Present on admission, pre-hospital   Patient has history of COPD, Congestive Heart Failure O2 dependent  Currently living at [facility] personal care facility  Family report the facility is not able to coordinate for patient to receive home oxygen at this time  No acute distress noted  Respiratory protocol with p r n  neb treatments  O2 at 2 L  Case management consulted to assist with coordination of O2 to be transferred or delivered to patient's personal care home

## 2018-06-17 NOTE — PLAN OF CARE
Problem: PHYSICAL THERAPY ADULT  Goal: Performs mobility at highest level of function for planned discharge setting  See evaluation for individualized goals  Treatment/Interventions: Functional transfer training, LE strengthening/ROM, Elevations, Therapeutic exercise, Endurance training, Patient/family training, Bed mobility, Gait training, Spoke to nursing          See flowsheet documentation for full assessment, interventions and recommendations  Prognosis: Good  Problem List: Decreased strength, Decreased endurance, Impaired balance, Decreased mobility, Decreased cognition, Decreased safety awareness  Assessment: pt is a 71y/o f who presents to [facility] c SOB  PMH significant for dementia, COPD, CHF, seizure disorder, + CVA/ruptured aneurysm  at baseline, pt ambulates mod (I) c RW  resides at [facility] c 10 steps to access bedroom  currently requires min (A)x1 for OOB mobility tasks 2* deficits in strength, balance, gait quality, cognition, + activity tolerance noted in PT exam above  Barthel Index 50/100  min verbal cues required for safety c transitions  ambulated 15' + 140' c RW min (A)x1 c seated rest required 2* SOB  stairs deferred at this time 2* SOB/fatigue  SaO2 90-91% on RA throughout session c pt in no apparent distress  would benefit from skilled PT to maximize functional mobility + improve quality of life  upon d/c, if facility unable to accept pt at current status, recommend STR  PT eval of high complexity 2* unstable med status c pt requiring ongoing medical management 2* SOB  pt c significantly impaired cognition requiring cues for safety espeically c ambulation  presents c mobility deficits above requiring min (A)x1 to decrease fall risk  SOB noted c mobility tasks despite SaO2 90-91% on RA  resides at personal care facility c 10 steps to access bedroom    Barriers to Discharge: Inaccessible home environment     Recommendation: Short-term skilled PT, Home PT, Other (Comment) (STR vs detention c PT pending PAULO's ability to cont care for pt)     PT - OK to Discharge: Yes    See flowsheet documentation for full assessment

## 2018-06-17 NOTE — HOME QUALIFYING OXYGEN TEST
Home Oxygen Qualifying Test       Patient name: Juan Cain        : 1945   Date of Test:  2018  Diagnosis:      Home Oxygen Test:    **Medicare Guidelines require item(s) 1-5 on all ambulatory patients or 1 and 2 on on-ambulatory patients  1   Baseline SPO2 on Room Air at rest 93 %  If = or < 88% on room air add O2 via NC and titrate patient  Patient must be ambulated with O2 and titrated to > 88% with exertion  2   SPO2 on Oxygen at rest 95 %  3   SPO2 during exercise on Room Air 86 %  4   SPO2 during exercise on Oxygen  91% at a liter flow of 1 lpm     5   Exercise performed:    [x] Walking     [] Stairs     [x] Duration 10 (min )     [x] Distance 150 (ft )       [x]  Supplemental Home Oxygen is indicated  []  Client does not qualify for home oxygen        Neto Brown RT

## 2018-06-17 NOTE — CASE MANAGEMENT
Initial Clinical Review    Admission: Date/Time/Statement: Observation 6/16 @ 6140    Orders Placed This Encounter   Procedures    Place in Observation (expected length of stay for this patient is less than two midnights)     Standing Status:   Standing     Number of Occurrences:   1     Order Specific Question:   Admitting Physician     Answer:   Tha Brower [[id number]]     Order Specific Question:   Level of Care     Answer:   Med Surg [16]       ED: Date/Time/Mode of Arrival:   ED Arrival Information     Expected Arrival Acuity Means of Arrival Escorted By Service Admission Type    - 6/16/2018 18:54 Urgent Ambulance [address]  [facility] General Medicine Urgent    Arrival Complaint    -          Chief Complaint:   Chief Complaint   Patient presents with    Shortness of Breath     Pt co SOB and not feeling well for the past few days  As per EMS was seen at PCP this week for low O2 sat and was put on 3 L of oxygen  History of Illness: 71 y o  female history of renal disorder, dementia, AFib COPD, Congestive Heart Failure chronic O2 2 liters, CAD, AFib who presents with chief complaint of shortness of breath From personal care home [facility] for O2  Per ED record, patient he has history of COPD, Congestive Heart Failure on chronic O2 unfortunately personal care home has not been able to obtain O2 for patient    She is here for O2 supplementation        ED Vital Signs:   ED Triage Vitals [06/16/18 1858]   Temperature Pulse Respirations Blood Pressure SpO2   97 6 °F (36 4 °C) 56 18 149/68 93 %      Temp Source Heart Rate Source Patient Position - Orthostatic VS BP Location FiO2 (%)   Oral Monitor Sitting Right arm --      Pain Score       No Pain        Wt Readings from Last 1 Encounters:   06/17/18 84 kg (185 lb 3 oz)     O2 2L N/C    Vital Signs (abnormal):   06/17/18 0714  97 8 °F (36 6 °C)   47  18   179/74  98 %  Nasal cannula  Lying   06/17/18 0350  --   47  18  141/64  --  --  Lying   06/17/18 0142  --  56  --  154/70  --  --  Lying   06/17/18 0029  98 4 °F (36 9 °C)  55  18   193/79  96 %  Nasal cannula  Lying   06/16/18 2300  --   52  20  164/97  96 %  Nasal cannula  Sitting   06/16/18 2200  --   52  20  162/74  95 %  Nasal cannula  Sitting   06/16/18 2145  --   52   24  --   85 %           Abnormal Labs/Diagnostic Test Results:   CXR - 1  Resolution of interstitial pulmonary edema since 5/7/2018  At least mild pulmonary vascular congestion remains  2   Unchanged moderate cardiomegaly  BUN 5 - 25 mg/dL 30     Creatinine 0 60 - 1 30 mg/dL 1 74         ED Treatment:   Medication Administration from 06/16/2018 1854 to 06/17/2018 0000     None          Past Medical/Surgical History:    Active Ambulatory Problems     Diagnosis Date Noted    HTN (hypertension) 12/10/2017    COPD (chronic obstructive pulmonary disease) ([facility] ) 12/10/2017    Chronic diastolic congestive heart failure ([facility] ) 12/10/2017    Acute on chronic respiratory failure ([facility] ) 03/22/2018    [facility] fl[facility] 03/22/2018    Back pain 03/22/2018    Dementia 03/23/2018    HLD (hyperlipidemia) 03/23/2018    UTI (urinary tract infection) 03/24/2018    Seizure disorder ([facility] 03/24/2018    RSV infection 03/24/2018    Ambulatory dysfunction 03/29/2018    Hypernatremia 04/13/2018    Stage 3 chronic kidney disease 04/13/2018    Bilateral leg edema 04/13/2018    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 04/18/2018    JESENIA (obstructive sleep apnea) 05/04/2018    Intermittent claudication of both lower extremities due to atherosclerosis ([facility] ) 05/04/2018    Atrial fibrillation ([facility] ) 05/07/2018     Resolved Ambulatory Problems     Diagnosis Date Noted    Cystitis 12/10/2017    Elevated troponin 12/10/2017    TAM (acute kidney injury) ([facility] ) 12/10/2017     Past Medical History:   Diagnosis Date    Atrial fibrillation ([facility] )     Brain aneurysm     CAD (coronary artery disease)     Cardiac disease     CHF (congestive heart failure) (HCC)     CKD (chronic kidney disease)     COPD (chronic obstructive pulmonary disease) (HCC)     Dementia     Dementia     Disease of thyroid gland     Hyperlipidemia     Hyperlipidemia     Hypertension     MI (my[facility] infarction) [facility]     Migraine     Renal disorder     [facility]     Stroke ([facility] )        Admitting Diagnosis: SOB (shortness of breath) [R06 02]    Age/Sex: 71 y o  female    Assessment/Plan:   * SOB (shortness of breath)   Assessment & Plan     Present on admission, pre-hospital   Patient has history of COPD, Congestive Heart Failure O2 dependent  Currently living at [facility] personal care facility  Family report the facility is not able to coordinate for patient to receive home oxygen at this time  No acute distress noted  Respiratory protocol with p r n  neb treatments  O2 at 2 L  Case management consulted to assist with coordination of O2 to be transferred or delivered to patient's personal care home           Stage 3 chronic kidney disease   Assessment & Plan     Acute on chronic  Monitor nephrotoxin agents  Monitor creatinine           Dementia   Assessment & Plan     Patient is alert  Continue to redirect  Monitor for safety           Chronic diastolic congestive heart failure (HCC)   Assessment & Plan     Chronic, O2 dependent  For dyspnea  Monitor intake and output, low-sodium diet encouraged           COPD (chronic obstructive pulmonary disease) (HCC)   Assessment & Plan     O2 dependent  No acute exacerbation noted  Continue home inhalers  Respiratory protocol with p r n  neb treatments             HTN (hypertension)   Assessment & Plan     Blood pressure elevated on admission  Continue metoprolol, Cardizem and Lasix               VTE Prophylaxis: Warfarin (Coumadin)  / sequential compression device   Code Status: DNR level 3    Admission Orders:  Home O2 eval  PT eval and treat    Scheduled Meds:   Current Facility-Administered Medications:  allopurinol 100 mg Oral Daily   aspirin 81 mg Oral Daily   atorvastatin 10 mg Oral Daily With Dinner   buPROPion 150 mg Oral Daily   carBAMazepine 100 mg Oral TID   cholecalciferol 2,000 Units Oral Daily   diltiazem 180 mg Oral Once   diltiazem 180 mg Oral Q12H [facility]   divalproex sodium 250 mg Oral Daily   fenofibrate 48 mg Oral Daily   ferrous sulfate 325 mg Oral BID   fluticasone-salmeterol 1 puff Inhalation Q12H TONI   furosemide 60 mg Oral Daily   levothyroxine 88 mcg Oral Early Morning   metoprolol tartrate 75 mg Oral Q12H TONI   potassium chloride 20 mEq Oral Daily   warfarin 7 5 mg Oral Daily (warfarin)     Continuous Infusions:    PRN Meds:   acetaminophen    albuterol

## 2018-06-17 NOTE — ASSESSMENT & PLAN NOTE
O2 dependent  No acute exacerbation noted  Continue home inhalers  Respiratory protocol with p r n  neb treatments

## 2018-06-17 NOTE — ED NOTES
Called [facility] and spoke to Alexandra Brice [facility] who reports as the do have an order for O2 for pt they have not been able to obtain the O2 from the supplier  Dr Laura Schwarz made aware        Hardy Gowers, RN  06/16/18 0526

## 2018-06-18 PROBLEM — J96.11 CHRONIC HYPOXEMIC RESPIRATORY FAILURE (HCC): Status: ACTIVE | Noted: 2018-06-18

## 2018-06-18 PROBLEM — R00.1 BRADYCARDIA: Status: ACTIVE | Noted: 2018-06-18

## 2018-06-18 LAB
ANION GAP SERPL CALCULATED.3IONS-SCNC: 5 MMOL/L (ref 4–13)
BUN SERPL-MCNC: 29 MG/DL (ref 5–25)
CALCIUM SERPL-MCNC: 9.2 MG/DL (ref 8.3–10.1)
CHLORIDE SERPL-SCNC: 106 MMOL/L (ref 100–108)
CO2 SERPL-SCNC: 32 MMOL/L (ref 21–32)
CREAT SERPL-MCNC: 1.44 MG/DL (ref 0.6–1.3)
GFR SERPL CREATININE-BSD FRML MDRD: 37 ML/MIN/1.73SQ M
GLUCOSE SERPL-MCNC: 124 MG/DL (ref 65–140)
INR PPP: 1.71 (ref 0.86–1.17)
POTASSIUM SERPL-SCNC: 3.9 MMOL/L (ref 3.5–5.3)
PROTHROMBIN TIME: 19.9 SECONDS (ref 11.8–14.2)
SODIUM SERPL-SCNC: 143 MMOL/L (ref 136–145)

## 2018-06-18 PROCEDURE — 97110 THERAPEUTIC EXERCISES: CPT

## 2018-06-18 PROCEDURE — 85610 PROTHROMBIN TIME: CPT | Performed by: FAMILY MEDICINE

## 2018-06-18 PROCEDURE — 99232 SBSQ HOSP IP/OBS MODERATE 35: CPT | Performed by: FAMILY MEDICINE

## 2018-06-18 PROCEDURE — 97116 GAIT TRAINING THERAPY: CPT

## 2018-06-18 PROCEDURE — 99223 1ST HOSP IP/OBS HIGH 75: CPT | Performed by: INTERNAL MEDICINE

## 2018-06-18 PROCEDURE — 80048 BASIC METABOLIC PNL TOTAL CA: CPT | Performed by: FAMILY MEDICINE

## 2018-06-18 RX ORDER — METOPROLOL TARTRATE 50 MG/1
50 TABLET, FILM COATED ORAL EVERY 12 HOURS SCHEDULED
Status: DISCONTINUED | OUTPATIENT
Start: 2018-06-18 | End: 2018-06-19 | Stop reason: HOSPADM

## 2018-06-18 RX ADMIN — FLUTICASONE PROPIONATE AND SALMETEROL 1 PUFF: 50; 250 POWDER RESPIRATORY (INHALATION) at 10:24

## 2018-06-18 RX ADMIN — FERROUS SULFATE TAB 325 MG (65 MG ELEMENTAL FE) 325 MG: 325 (65 FE) TAB at 18:16

## 2018-06-18 RX ADMIN — ATORVASTATIN CALCIUM 10 MG: 10 TABLET, FILM COATED ORAL at 18:16

## 2018-06-18 RX ADMIN — METOPROLOL TARTRATE 75 MG: 50 TABLET ORAL at 10:16

## 2018-06-18 RX ADMIN — METOPROLOL TARTRATE 50 MG: 50 TABLET ORAL at 22:30

## 2018-06-18 RX ADMIN — ASPIRIN 81 MG: 81 TABLET, COATED ORAL at 10:18

## 2018-06-18 RX ADMIN — FERROUS SULFATE TAB 325 MG (65 MG ELEMENTAL FE) 325 MG: 325 (65 FE) TAB at 10:17

## 2018-06-18 RX ADMIN — FLUTICASONE PROPIONATE AND SALMETEROL 1 PUFF: 50; 250 POWDER RESPIRATORY (INHALATION) at 22:30

## 2018-06-18 RX ADMIN — CARBAMAZEPINE 100 MG: 100 TABLET, CHEWABLE ORAL at 18:16

## 2018-06-18 RX ADMIN — POTASSIUM CHLORIDE 20 MEQ: 1500 TABLET, EXTENDED RELEASE ORAL at 10:18

## 2018-06-18 RX ADMIN — DIVALPROEX SODIUM 250 MG: 250 TABLET, FILM COATED, EXTENDED RELEASE ORAL at 10:18

## 2018-06-18 RX ADMIN — BUPROPION HYDROCHLORIDE 150 MG: 150 TABLET, FILM COATED, EXTENDED RELEASE ORAL at 10:18

## 2018-06-18 RX ADMIN — FENOFIBRATE 48 MG: 48 TABLET, FILM COATED ORAL at 10:18

## 2018-06-18 RX ADMIN — WARFARIN SODIUM 7.5 MG: 7.5 TABLET ORAL at 18:16

## 2018-06-18 RX ADMIN — CARBAMAZEPINE 100 MG: 100 TABLET, CHEWABLE ORAL at 22:30

## 2018-06-18 RX ADMIN — CHOLECALCIFEROL TAB 25 MCG (1000 UNIT) 2000 UNITS: 25 TAB at 10:18

## 2018-06-18 RX ADMIN — LEVOTHYROXINE SODIUM 88 MCG: 88 TABLET ORAL at 06:44

## 2018-06-18 RX ADMIN — CARBAMAZEPINE 100 MG: 100 TABLET, CHEWABLE ORAL at 10:18

## 2018-06-18 RX ADMIN — FUROSEMIDE 60 MG: 40 TABLET ORAL at 10:19

## 2018-06-18 RX ADMIN — ALLOPURINOL 100 MG: 100 TABLET ORAL at 10:17

## 2018-06-18 NOTE — CASE MANAGEMENT
OBS converted to IP on 6/18  @0605    Admitting Physician SCOTT REAGAN    Level of Care Med Surg    Estimated length of stay More than 2 Midnights    Certification I certify that inpatient services are medically necessary for this patient for a duration of greater than two midnights  See H&P and MD Progress Notes for additional information about the patient's course of treatment

## 2018-06-18 NOTE — ASSESSMENT & PLAN NOTE
Patient is still bradycardic  However she is on a lot of medications which are contributing  I did stop the patient's digoxin yesterday  I will have Cardiology evaluate for any dose adjustments  They may consider just keeping the patient on her current medications but we will see what they say  The patient follows up with Dr Price Shaikh as an outpatient

## 2018-06-18 NOTE — SOCIAL WORK
Spoke with Rolan Ovalles at [facility] regarding pt's O2 prior to admission, she reports pt was being worked up for O2 through [facility] but they were notified by Nancy that pt has had O2 within the past 5 years and they should contact [facility] at P#[phone]  Rolan Ovalles also reports she spoke with the owner and they will be able to accept pt back to their facility at time of discharge  Call made to [address] , spoke with representative who reports that pt did previously have O2 with them but all equipment was returned to them on 10/23/2014  CM spoke again with pt's daughter Valery Gunn is agreeable to using [facility] for O2  Kike at [facility] reports they will be able to deliver a tank to patient's bedside and a concentrator to patient's home after discharge  Cm will continue to follow

## 2018-06-18 NOTE — SOCIAL WORK
LOS 1  LACE 80  Patient is not a 30 day readmission  CM spoke with pt's daughter Charline Landis via telephone as pt has history of dementia  Charline Landis reports her mother resides at [facility] where she has as needed assistance for ADLs but is otherwise independent and ambulates using a walker independently  She has previously used [facility] and [facility] for [facility]  Her daughter reports she had previously stayed in multiple SNF for STR including [place], [facility] and most recently [place]  She reports she was unhappy with her mothers care there as her mother was reportedly taken off of O2 during her stay  Pt receives her medications from [facility] and denies difficulty affording  She does have an AD/LW with POA naming her daughter Charline Landis, copy on record  Charline Landis reports she transports her mother to appointments and is currently in [facility] but her  will be able to transport pt home at time of discharge until she returns on Wednesday  Charline Landis was trying to obtain O2 for patient but was unable to prior to her coming to the hospital  She is unsure of the agency they were using but is agreeable to [facility]  Referral placed in [facility] to [facility] and discussed with Kike at [employer]  She also reports that if [facility] is unable to accept her mother back she is not open to STR as she does not feel her mother benefits from [facility] and would plan to take her home with her  She is agreeable to referral being placed back to [place] for SN/PT/OT upon return home  Call placed to Juan Saenz in admitting at [facility] to discuss patients return to their facility - message left  Juan Saenz reportedly out of office today - awaiting call back from Vladimir in nursing department at [facility]  CM will continue to monitor

## 2018-06-18 NOTE — PLAN OF CARE
DISCHARGE PLANNING     Discharge to home or other facility with appropriate resources Progressing        INFECTION - ADULT     Absence or prevention of progression during hospitalization Progressing        Knowledge Deficit     Patient/family/caregiver demonstrates understanding of disease process, treatment plan, medications, and discharge instructions Progressing        Potential for Falls     Patient will remain free of falls Progressing        Prexisting or High Potential for Compromised Skin Integrity     Skin integrity is maintained or improved Progressing        RESPIRATORY - ADULT     Achieves optimal ventilation and oxygenation Progressing        SAFETY ADULT     Maintain or return to baseline ADL function Progressing     Maintain or return mobility status to optimal level Progressing

## 2018-06-18 NOTE — PLAN OF CARE
Problem: PHYSICAL THERAPY ADULT  Goal: Performs mobility at highest level of function for planned discharge setting  See evaluation for individualized goals  Treatment/Interventions: Functional transfer training, LE strengthening/ROM, Elevations, Therapeutic exercise, Endurance training, Patient/family training, Bed mobility, Gait training, Spoke to nursing          See flowsheet documentation for full assessment, interventions and recommendations  Outcome: Progressing  Prognosis: Good  Problem List: Decreased strength, Decreased endurance, Impaired balance, Decreased mobility, Decreased cognition, Decreased safety awareness  Assessment: Pt seen for PT treatment session this date with interventions consisting of gait training w/ emphasis on improving pt's ability to ambulate level surfaces x 150' x3 ft with SBA- CGA provided by therapist with RW, Therapeutic exercise consisting of: AROM 10 reps B LE in sitting position and navigating 10  step trials with step to pattern with CGA  Pt agreeable to PT treatment session upon arrival, pt found seated OOB in recliner, in no apparent distress, A&O x 2 and responsive  In comparison to previous session, pt with improvements in gait training tolerance for several trials requiring standing rest breaks between, no LOB overtly observed, cues required for obstacle negotation to avoid pinching hand between RW and wall as pt tendency is to veer towards wall  Pt able to tolerate seated ther ex for 10 rep sets with cues required for technique  Pt able to ascend/descend  step x10 trials without difficulty or need for external A  CGA only provided throughout phases of mobility for safety  Pt denying any SOB/GRAF throughout duration of session   Post session: pt returned back to recliner, chair alarm engaged, all needs in reach and RN notified of session findings/recommendations and NSG staff educated/encouraged to mobilize pt A of 1 with RW and ambulate ad neema to maximize endurance gains  Recommend return to previous facility with staff support and HHPT at time of d/c in order to maximize pt's functional independence and safety w/ mobility  Note if facility unable to provide assistance- will need to consider STR  Pt continues to be functioning below baseline level, and remains limited 2* factors listed above  PT will continue to see pt while here in order to address the deficits listed above and provide interventions consistent w/ POC in effort to achieve STGs  Barriers to Discharge: None     Recommendation: Home PT, Home with family support (pending [facility]'s ability to continue care for pt)     PT - OK to Discharge: Yes (when medically cleared, see rec above)    See flowsheet documentation for full assessment

## 2018-06-18 NOTE — PHYSICIAN ADVISOR
Current patient class: Observation  The patient is currently on Hospital Day: 2 at [address]      The patient was admitted to the hospital at N/A on N/A for the following diagnosis:  SOB (shortness of breath) [R06 02]       There is documentation in the medical record of an expected length of stay of at least 2 midnights  The patient is therefore expected to satisfy the 2 midnight benchmark and given the 2 midnight presumption is appropriate for INPATIENT ADMISSION  Given this expectation of a satisfying stay, CMS instructs us that the patient is most often appropriate for inpatient admission under part A provided medical necessity is documented in the chart  After review of the relevant documentation, labs, vital signs and test results, the patient is appropriate for INPATIENT ADMISSION  Admission to the hospital as an inpatient is a complex decision making process which requires the practitioner to consider the patients presenting complaint, history and physical examination and all relevant testing  With this in mind, in this case, the patient was deemed appropriate for INPATIENT ADMISSION  After review of the documentation and testing available at the time of the admission I concur with this clinical determination of medical necessity  Rationale is as follows: The patient is a 71 yrs old Female who presented to the ED at 6/16/2018  6:54 PM with a chief complaint of Shortness of Breath (Pt co SOB and not feeling well for the past few days  As per EMS was seen at PCP this week for low O2 sat and was put on 3 L of oxygen )     Given the need for further hospitalization, and along with the documentation of medical necessity present in the chart, the patient is appropriate for inpatient admission  The patient is expected to satisfy the 2 midnight benchmark, and will require further acute medical care   The patient does have comorbid conditions which increases the risk for significant adverse outcome  Given this the patient is appropriate for inpatient admission        The patients vitals on arrival were ED Triage Vitals [06/16/18 1858]   Temperature Pulse Respirations Blood Pressure SpO2   97 6 °F (36 4 °C) 56 18 149/68 93 %      Temp Source Heart Rate Source Patient Position - Orthostatic VS BP Location FiO2 (%)   Oral Monitor Sitting Right arm --      Pain Score       No Pain           Past Medical History:   Diagnosis Date    Atrial fibrillation ([facility]     Brain aneurysm     CAD (coronary artery disease)     Cardiac disease     had stents 12 years ago in [facility]    [facility]est[facility]     CKD (chronic kidney disease)     COPD [facility]chronic obstructive pulmonary disease) [facility]     Dementia     Dementia     Disease of thyroid gland     Hyperlipidemia     Hyperlipidemia     Hypertension     MI (myocardial infarction) ([facility]     Migraine     Renal disorder     Seizures ([facility] )     Stroke ([facility] )      Past Surgical History:   Procedure Laterality Date    APPENDECTOMY      BLADDER TUMOR EXCISION      BRAIN SURGERY      CARDIAC SURGERY      CORONARY STENT PLACEMENT      5 stents    EYE SURGERY      MANDIBLE FRACTURE SURGERY      TONSILLECTOMY      TUBAL LIGATION      UTERINE FIBROID SURGERY             Consults have been placed to:   IP CONSULT TO CASE MANAGEMENT    Vitals:    06/17/18 0714 06/17/18 0905 06/17/18 1529 06/17/18 2109   BP: (!) 179/74  166/76 139/71   BP Location: Right arm  Right arm Right arm   Pulse: (!) 47 55 61 61   Resp: 18  18    Temp: 97 8 °F (36 6 °C)      TempSrc: Oral      SpO2: 98%  92%    Weight:       Height:           Most recent labs:    Recent Labs      06/16/18 1953 06/16/18 1954 06/17/18 0458  06/17/18   0921   WBC  5 94   --    --    --    --    HGB  12 0   --    --    --    --    HCT  36 4   --    --    --    --    PLT  148*   --    --    --    --    K   --    < >  3 6   --   3 8   NA   --    < >  141 --   143   CALCIUM   --    < >  9 4   --   9 4   BUN   --    < >  30*   --   27*   CREATININE   --    < >  1 74*   --   1 48*   INR   --    --    --   1 62*   --    TROPONINI  <0 02   --    --    --    --    AST   --    --   28   --    --    ALT   --    --   44   --    --    ALKPHOS   --    --   68   --    --    BILITOT   --    --   0 30   --    --     < > = values in this interval not displayed         Scheduled Meds:  Current Facility-Administered Medications:  acetaminophen 488 mg Oral Q6H PRN HerTANVI Whyte   albuterol 2 5 mg Nebulization Q6H PRN Herchen Vasquez, TANVI   allopurinol 100 mg Oral Daily [facility], BETHNP   aspirin 81 mg Oral Daily [facility], BETHNP   atorvastatin 10 mg Oral Daily [employer], CRNP   buPROPion 150 mg Oral Daily Herchen Vasquez, BETHNP   carBAMazepine 100 mg Oral TID Herchen Vasquez, TANVI   cholecalciferol 2,000 Units Oral Daily Herchen Vasquez, TANVI   diltiazem 180 mg Oral Once Herline Pedro, TANVI   diltiazem 180 mg Oral Q12H [address] [facility], TANVI   divalproex sodium 250 mg Oral Daily Herline Pedro, TANVI   fenofibrate 48 mg Oral Daily Herchen Vasquez, TANVI   ferrous sulfate 325 mg Oral BID [facility] Pedro, TANVI   fluticasone-salmeterol 1 puff Inhalation Q12H [address] [facility] TANVI Vasquez   furosemide 60 mg Oral Daily [facility] Pedro, TANVI   levothyroxine 88 mcg Oral Early Morning [facility] Pedro, TANVI   metoprolol tartrate 75 mg Oral Q12H [address] [facility] TANVI Vasquez   potassium chloride 20 mEq Oral Daily HerTANVI Whyte   warfarin 7 5 mg Oral Daily (warfarin) Clarisse Aponte MD     Continuous Infusions:   PRN Meds:   acetaminophen    albuterol    Surgical procedures (if appropriate):

## 2018-06-18 NOTE — SOCIAL WORK
CM assisted with contacting [facility] and   CM updated Skyla Shankar RN CM that Patricia Queen should be calling back shortly as she is on break and , Shashank Villaseñor is off today

## 2018-06-18 NOTE — PROGRESS NOTES
Progress Note Chris Veras [birth year], 71 y o  female MRN: [id number]    Unit/Bed#: -01 Encounter: [id number]    Primary Care Provider: Damian Contreras DO   Date and time admitted to hospital: 6/16/2018  6:54 PM        Bradycardia   Assessment & Plan    Patient is still bradycardic  However she is on a lot of medications which are contributing  I did stop the patient's digoxin yesterday  I will have Cardiology evaluate for any dose adjustments  They may consider just keeping the patient on her current medications but we will see what they say  The patient follows up with Dr Burton Martinez as an outpatient  Chronic indwelling Davis catheter   Assessment & Plan    Davis care  This is chronic        Atrial fibrillation ([facility] )   Assessment & Plan    Patient's INR subtherapeutic  Will give 7 5 mg of Coumadin tonight check another INR in the morning  Stage 3 chronic kidney disease   Assessment & Plan    Acute on chronic  Monitor nephrotoxin agents  Monitor creatinine  Dementia   Assessment & Plan    Patient is alert  Continue to redirect  Monitor for safety  This appears to be mild        Chronic diastolic congestive heart failure (HCC)   Assessment & Plan    Chronic, O2 dependent  For dyspnea  Monitor intake and output, low-sodium diet encouraged  Patient appears to be euvolemic        COPD (chronic obstructive pulmonary disease) ([facility] )   Assessment & Plan    O2 dependent  No acute exacerbation noted  Continue home inhalers  Respiratory protocol with p r n  neb treatments  Patient will need oxygen on discharge  She does not have this available to her at the assisted living facility  Will discuss with case management to make sure this set up  She did qualify and did ambulate with respiratory  HTN (hypertension)   Assessment & Plan    Blood pressure elevated on admission  Continue metoprolol, Cardizem and Lasix          * SOB (shortness of breath)   Assessment & Plan Present on admission, pre-hospital   Patient has history of COPD, Congestive Heart Failure O2 dependent  Currently living at [facility] personal care facility  Family report the facility is not able to coordinate for patient to receive home oxygen at this time  No acute distress noted  Respiratory protocol with p r n  neb treatments  O2 at 2 L  Case management consulted to assist with coordination of O2 to be transferred or delivered to patient's personal care home  Chronic hypoxemic respiratory failure with underlying COPD    VTE Pharmacologic Prophylaxis:   Pharmacologic: Warfarin (Coumadin)  Mechanical VTE Prophylaxis in Place: Yes    Patient Centered Rounds: I have performed bedside rounds with nursing staff today  Discussions with Specialists or Other Care Team Provider:  Will discuss with Cardiology    Education and Discussions with Family / Patient:   Verner Barceloneta / Patient:    Time Spent for Care: 20 minutes  More than 50% of total time spent on counseling and coordination of care as described above  Current Length of Stay: 1 day(s)    Current Patient Status: Inpatient   Certification Statement: The patient will continue to require additional inpatient hospital stay due to Being bradycardic and having an elevated blood pressure    Discharge Plan:   Potential discharge either toda or tomorrow depending on cardiology evaluation     Code Status: Level 3 - DNAR and DNI      Subjective:   Patient seen examined  She has no complaints  She states she feels okay  Objective:     Vitals:   Temp (24hrs), Av °F (36 7 °C), Min:97 7 °F (36 5 °C), Max:98 3 °F (36 8 °C)    HR:  [45-61] 51  Resp:  [16-18] 18  BP: (134-175)/(65-76) 175/73  SpO2:  [92 %-96 %] 92 %  Body mass index is 33 87 kg/m²  Input and Output Summary (last 24 hours):        Intake/Output Summary (Last 24 hours) at 18 0915  Last data filed at 18 0641   Gross per 24 hour   Intake              550 ml   Output 2100 ml   Net            -1550 ml       Physical Exam:     Physical Exam  (   General Appearance:    Alert, cooperative, no distress, appears stated age                               Lungs:     Clear to auscultation bilaterally, respirations unlabored       Heart:    Regular rate and rhythm, S1 and S2 normal, no murmur, rub    or gallop   Abdomen:     Soft, non-tender, bowel sounds active all four quadrants,     no masses, no organomegaly           Extremities:   Extremities normal, atraumatic, no cyanosis or edema       Additional Data:     Labs:      Results from last 7 days  Lab Units 06/16/18 1953   WBC Thousand/uL 5 94   HEMOGLOBIN g/dL 12 0   HEMATOCRIT % 36 4   PLATELETS Thousands/uL 148*   NEUTROS PCT % 51   LYMPHS PCT % 37   MONOS PCT % 9   EOS PCT % 1       Results from last 7 days  Lab Units 06/18/18  0505  06/16/18 1954   SODIUM mmol/L 143  < > 141   POTASSIUM mmol/L 3 9  < > 3 6   CHLORIDE mmol/L 106  < > 104   CO2 mmol/L 32  < > 30   BUN mg/dL 29*  < > 30*   CREATININE mg/dL 1 44*  < > 1 74*   CALCIUM mg/dL 9 2  < > 9 4   TOTAL PROTEIN g/dL  --   --  7 9   BILIRUBIN TOTAL mg/dL  --   --  0 30   ALK PHOS U/L  --   --  68   ALT U/L  --   --  44   AST U/L  --   --  28   GLUCOSE RANDOM mg/dL 124  < > 174*   < > = values in this interval not displayed  Results from last 7 days  Lab Units 06/18/18  0505   INR  1 71*                 * I Have Reviewed All Lab Data Listed Above  * Additional Pertinent Lab Tests Reviewed:  [facility] Admission Reviewed        Recent Cultures (last 7 days):           Last 24 Hours Medication List:     Current Facility-Administered Medications:  acetaminophen 488 mg Oral Q6H PRN TANVI Mijares   albuterol 2 5 mg Nebulization Q6H PRN TANVI Mijares   allopurinol 100 mg Oral Daily TANVI Mijares   aspirin 81 mg Oral Daily TANVI Mijares   atorvastatin 10 mg Oral Daily [employer], TANVI   buPROPion 150 mg Oral Daily TANVI Mijares   carBAMazepine 100 mg Oral TID TANVI Mijares   cholecalciferol 2,000 Units Oral Daily TANVI Mijares   diltiazem 180 mg Oral Once TANVI Mijares   diltiazem 180 mg Oral Q12H [address] TANVI Mijares   divalproex sodium 250 mg Oral Daily TANVI Mijares   fenofibrate 48 mg Oral Daily TANVI Mijares   ferrous sulfate 325 mg Oral BID TANVI Mijares   fluticasone-salmeterol 1 puff Inhalation Q12H [address] TANVI Mijares   furosemide 60 mg Oral Daily TANVI Mijares   levothyroxine 88 mcg Oral Early Morning TANVI Mijares   metoprolol tartrate 75 mg Oral Q12H [address] TANVI Mijares   potassium chloride 20 mEq Oral Daily TANVI Mijares   warfarin 7 5 mg Oral Daily (warfarin) Inez Thomas MD        Today, Patient Was Seen By: Inez Thomas MD    ** Please Note: Dictation voice to text software may have been used in the creation of this document   **

## 2018-06-18 NOTE — PLAN OF CARE
Problem: DISCHARGE PLANNING - CARE MANAGEMENT  Goal: Discharge to post-acute care or home with appropriate resources  INTERVENTIONS:  - Conduct assessment to determine patient/family and health care team treatment goals, and need for post-acute services based on payer coverage, community resources, and patient preferences, and barriers to discharge  - Address psychosocial, clinical, and financial barriers to discharge as identified in assessment in conjunction with the patient/family and health care team  - Arrange appropriate level of post-acute services according to patients   needs and preference and payer coverage in collaboration with the physician and health care team  - Communicate with and update the patient/family, physician, and health care team regarding progress on the discharge plan  - Arrange appropriate transportation to post-acute venues  Outcome: Progressing  LOS 1  LACE 80  Patient is not a 30 day readmission  CM spoke with pt's daughter Phoenix Díaz via telephone as pt has history of dementia  Phoenix Díaz reports her mother resides at [facility] where she has as needed assistance for ADLs but is otherwise independent and ambulates using a walker independently  She has previously used [facility] for [facility]NA  Her daughter reports she had previously stayed in multiple SNF for STR including [place], [facility] and most recently [place] [facility]  She reports she was unhappy with her mothers care there as her mother was reportedly taken off of O2 during her stay  Pt receives her medications from [facility] and denies difficulty affording  She does have an AD/LW with POA naming her daughter Phoenix Díaz, copy on record  Phoenixchris Díaz reports she transports her mother to appointments and is currently in [facility] but her  will be able to transport pt home at time of discharge until she returns on Wednesday       Phoenix Díaz was trying to obtain O2 for patient but was unable to prior to her coming to the hospital  She is unsure of the agency they were using but is agreeable to [facility]  Referral placed in [facility] to [facility] and discussed with Kike at [facility]  She also reports that if [facility] is unable to accept her mother back she is not open to STR as she does not feel her mother benefits from [address] and would plan to take her home with her  She is agreeable to referral being placed back to [facility] for SN/PT/OT upon return home  Call placed to Filiberto in admitting at [facility] to discuss patients return to their facility - message left  Filiberto reportedly out of office today - awaiting call back from Vladimir in nursing department at [facility]  SHABANA will continue to monitor

## 2018-06-18 NOTE — ASSESSMENT & PLAN NOTE
Patient's INR subtherapeutic  Will give 7 5 mg of Coumadin tonight check another INR in the morning

## 2018-06-18 NOTE — ASSESSMENT & PLAN NOTE
O2 dependent  No acute exacerbation noted  Continue home inhalers  Respiratory protocol with p evelyne n  neb treatments  Patient will need oxygen on discharge  She does not have this available to her at the assisted living facility  Will discuss with case management to make sure this set up  She did qualify and did ambulate with respiratory

## 2018-06-18 NOTE — CONSULTS
Consultation - Cardiology  Shara Justice 71 y o  female MRN: [id number]  Unit/Bed#: -01 Encounter: [id number]    Inpatient consult to Cardiology  Consult performed by: Laureen Brothers ordered by: Alma Rosa Alexander          Physician Requesting Consult: Monisha Webb MD  Reason for Consult / Principal Problem: af/htn    Chief Complaint   Patient presents with    Shortness of Breath     Pt co SOB and not feeling well for the past few days  As per EMS was seen at PCP this week for low O2 sat and was put on 3 L of oxygen  HPI: Cardiologist Dr Anthony Huff is a 71y o  year old female who has a history of dementia, chronic kidney disease, AFib, COPD, CHF, chronic oxygen, CAD  Patient came to the emergency room with complaints of shortness of breath  Patient is a resident at [facility], who brought her in  Patient states she is feeling well  Patient is on chronic Coumadin for atrial fibrillation  She denies any chest pain, chest pressure, chest heaviness, she denies shortness of breath  Patient does have baseline dementia       REVIEW OF SYSTEMS:  Unable to assess given patient's baseline dementia      Historical Information   Past Medical History:   Diagnosis Date    Atrial fibrillation ([facility] )     Brain aneurysm     CAD (coronary artery disease)     Cardiac disease     had stents 12 years ago in [facility]    [facility]     CKD (chronic kidney disease)     COPD (chronic obstructive pulmonary disease) (HCC)     Dementia     Dementia     Disease of thyroid gland     Hyperlipidemia     Hyperlipidemia     Hypertension     [facility]     Migraine     Renal disorder     Seizures ([facility]     Stroke ([facility] )      Past Surgical History:   Procedure Laterality Date    APPENDECTOMY      BLADDER TUMOR EXCISION      BRAIN SURGERY      CARDIAC SURGERY      CORONARY STENT PLACEMENT      5 stents    EYE SURGERY      MANDIBLE FRACTURE SURGERY      TONSILLECTOMY      TUBAL LIGATION      UTERINE FIBROID SURGERY       History   Alcohol Use No     History   Drug Use No     History   Smoking Status    Former Smoker   Smokeless Tobacco    Never Used     Family History:   Family History   Problem Relation Age of Onset    Heart disease Father        MEDS & ALLERGIES:  all current active meds have been reviewed and current meds:   Current Facility-Administered Medications   Medication Dose Route Frequency    acetaminophen (TYLENOL) tablet 488 mg  488 mg Oral Q6H PRN    albuterol inhalation solution 2 5 mg  2 5 mg Nebulization Q6H PRN    allopurinol (ZYLOPRIM) tablet 100 mg  100 mg Oral Daily    aspirin (ECOTRIN LOW STRENGTH) EC tablet 81 mg  81 mg Oral Daily    atorvastatin (LIPITOR) tablet 10 mg  10 mg Oral Daily With Dinner    buPROPion (WELLBUTRIN SR) 12 hr tablet 150 mg  150 mg Oral Daily    carBAMazepine (TEGretol) chewable tablet 100 mg  100 mg Oral TID    cholecalciferol (VITAMIN D3) tablet 2,000 Units  2,000 Units Oral Daily    diltiazem (CARDIZEM SR) 12 hr capsule 180 mg  180 mg Oral Once    diltiazem (CARDIZEM SR) 12 hr capsule 180 mg  180 mg Oral Q12H TONI    divalproex sodium (DEPAKOTE ER) 24 hr tablet 250 mg  250 mg Oral Daily    fenofibrate (TRICOR) tablet 48 mg  48 mg Oral Daily    ferrous sulfate tablet 325 mg  325 mg Oral BID    fluticasone-salmeterol (ADVAIR) 250-50 mcg/dose inhaler 1 puff  1 puff Inhalation Q12H [address]    furosemide (LASIX) tablet 60 mg  60 mg Oral Daily    levothyroxine tablet 88 mcg  88 mcg Oral Early Morning    metoprolol tartrate (LOPRESSOR) tablet 50 mg  50 mg Oral Q12H TONI    potassium chloride (K-DUR,KLOR-CON) CR tablet 20 mEq  20 mEq Oral Daily    warfarin (COUMADIN) tablet 7 5 mg  7 5 mg Oral Daily (warfarin)        Allergies   Allergen Reactions    Ramipril Anaphylaxis    Spiriva Handihaler [Tiotropium Bromide Monohydrate] Swelling    Penicillins OBJECTIVE:  Vitals:   Vitals:    06/18/18 0700   BP: (!) 175/73   Pulse: (!) 51   Resp: 18   Temp: 98 3 °F (36 8 °C)   SpO2: 92%     Body mass index is 33 87 kg/m²  Systolic (48PCM), RAMÍREZ:617 , Min:134 , QCQ:625     Diastolic (99VBJ), QVN:06, Min:65, Max:76      Intake/Output Summary (Last 24 hours) at 06/18/18 1036  Last data filed at 06/18/18 0900   Gross per 24 hour   Intake              670 ml   Output             2100 ml   Net            -1430 ml     Weight (last 2 days)     Date/Time   Weight    06/17/18 0029  84 (185 19)    06/16/18 1858  87 4 (192 77)            Invasive Devices     Peripheral Intravenous Line            Peripheral IV 06/16/18 Left Antecubital 1 day          Drain            Urethral Catheter 20 Fr  -- days                PHYSICAL EXAMS:  General:  Patient is not in acute distress, laying in the bed comfortably, awake, alert  Head: Normocephalic, Atraumatic  HEENT:  Both pupils normal-size atraumatic, normocephalic, nonicteric  Neck:  JVP not raised  Trachea central  Respiratory:  Bronchovascular breathing all over the chest without any accompaniment  Cardiovascular:  S1-S2 normal irregularly irregular without any murmur or rub  GI:  Abdomen soft nontender  Liver and spleen normal size  Musculoskeletal:  No edema  Integument:  No skin rashes or ulceration  Lymphatic:  No cervical or inguinal lymphadenopathy  Neurologic:  Patient is awake alert       LABORATORY RESULTS:    Results from last 7 days  Lab Units 06/16/18 1953   TROPONIN I ng/mL <0 02     CBC with diff:   Results from last 7 days  Lab Units 06/16/18 1953   WBC Thousand/uL 5 94   HEMOGLOBIN g/dL 12 0   HEMATOCRIT % 36 4   MCV fL 103*   PLATELETS Thousands/uL 148*   MCH pg 34 0   MCHC g/dL 33 0   RDW % 14 1   MPV fL 11 4   NRBC AUTO /100 WBCs 0       CMP:  Results from last 7 days  Lab Units 06/18/18  0505 06/17/18  0921 06/16/18 1954   SODIUM mmol/L 143 143 141   POTASSIUM mmol/L 3 9 3 8 3 6   CHLORIDE mmol/L 106 106 104   CO2 mmol/L 32 32 30   ANION GAP mmol/L 5 5 7   BUN mg/dL 29* 27* 30*   CREATININE mg/dL 1 44* 1 48* 1 74*   GLUCOSE RANDOM mg/dL 124 173* 174*   CALCIUM mg/dL 9 2 9 4 9 4   AST U/L  --   --  28   ALT U/L  --   --  44   ALK PHOS U/L  --   --  68   TOTAL PROTEIN g/dL  --   --  7 9   BILIRUBIN TOTAL mg/dL  --   --  0 30   EGFR ml/min/1 73sq m 37 35 29       BMP:  Results from last 7 days  Lab Units 18  0505 18  0921 18  1954   SODIUM mmol/L 143 143 141   POTASSIUM mmol/L 3 9 3 8 3 6   CHLORIDE mmol/L 106 106 104   CO2 mmol/L 32 32 30   BUN mg/dL 29* 27* 30*   CREATININE mg/dL 1 44* 1 48* 1 74*   GLUCOSE RANDOM mg/dL 124 173* 174*   CALCIUM mg/dL 9 2 9 4 9 4                          Results from last 7 days  Lab Units 18  0505 18  0458   INR  1 71* 1 62*       Lipid Profile:   No results found for: CHOL  No results found for: HDL  No results found for: LDLCALC  No results found for: TRIG    Cardiac testing:   Results for orders placed during the hospital encounter of 12/10/17   Echo complete with contrast if indicated    Narrative [address] [place] [address] [phone]    Transthoracic Echocardiogram  2D, M-mode, Doppler, and Color Doppler    Study date:  11-Dec-2017    Patient: Steph Verma  MR number: [id number]  Account number: [de-identified]  : 1945  Age: 71 years  Gender: Female  Status: Inpatient  Location: Bedside  Height: 60 in  Weight: 192 lb  BP: 99/ 50 mmHg    Indications: Shortness of breath  Diagnoses: R06 00 - Dyspnea, unspecified, R06 02 - Shortness of breath    Sonographer:  Eileen Polo  Interpreting Physician:  Juvenal Salgado MD  Referring Physician:  Erasto Montanez PA-C  Group:  [facility]    SUMMARY    LEFT VENTRICLE:  Systolic function was normal  Ejection fraction was estimated in the range of 55 % to 65 %    There were no regional wall motion abnormalities  There was mild concentric hypertrophy  Doppler parameters were consistent with abnormal left ventricular relaxation (grade 1 diastolic dysfunction)  MITRAL VALVE:  There was mild annular calcification  There was mild regurgitation  AORTIC VALVE:  There was mild regurgitation  TRICUSPID VALVE:  There was mild regurgitation  PULMONIC VALVE:  There was mild regurgitation  HISTORY: PRIOR HISTORY: Elevated trponin, Dementia, Seizures, Stroke, Myocardial infarction  Congestive heart failure  Risk factors: hypertension and hypercholesterolemia  Chronic lung disease  PROCEDURE: The procedure was performed at the bedside  This was a routine study  The transthoracic approach was used  The study included complete 2D imaging, M-mode, complete spectral Doppler, and color Doppler  The heart rate was 68 bpm,  at the start of the study  Images were obtained from the parasternal, apical, subcostal, and suprasternal notch acoustic windows  Echocardiographic views were limited due to poor acoustic window availability, decreased penetration, and  lung interference  This was a technically difficult study  LEFT VENTRICLE: Size was normal  Systolic function was normal  Ejection fraction was estimated in the range of 55 % to 65 %  There were no regional wall motion abnormalities  Wall thickness was normal  There was mild concentric  hypertrophy  DOPPLER: Doppler parameters were consistent with abnormal left ventricular relaxation (grade 1 diastolic dysfunction)  RIGHT VENTRICLE: The size was normal  Systolic function was normal  Wall thickness was normal     LEFT ATRIUM: Size was normal     RIGHT ATRIUM: Size was normal     MITRAL VALVE: There was mild annular calcification  Valve structure was normal  There was normal leaflet separation  DOPPLER: The transmitral velocity was within the normal range  There was no evidence for stenosis  There was mild  regurgitation      AORTIC VALVE: The valve was trileaflet  Leaflets exhibited normal thickness and normal cuspal separation  DOPPLER: Transaortic velocity was within the normal range  There was no evidence for stenosis  There was mild regurgitation  TRICUSPID VALVE: The valve structure was normal  There was normal leaflet separation  DOPPLER: The transtricuspid velocity was within the normal range  There was no evidence for stenosis  There was mild regurgitation  PULMONIC VALVE: Leaflets exhibited normal thickness, no calcification, and normal cuspal separation  DOPPLER: The transpulmonic velocity was within the normal range  There was mild regurgitation  PERICARDIUM: There was no pericardial effusion  The pericardium was normal in appearance  AORTA: The root exhibited normal size  SYSTEM MEASUREMENT TABLES    2D  %FS: 29 2 %  Ao Diam: 3 1 cm  EDV(Teich): 140 8 ml  EF(Teich): 55 6 %  ESV(Teich): 62 5 ml  IVSd: 1 1 cm  LA Area: 20 9 cm2  LA Diam: 3 7 cm  LVEDV MOD A4C: 117 1 ml  LVEF MOD A4C: 50 3 %  LVESV MOD A4C: 58 2 ml  LVIDd: 5 4 cm  LVIDs: 3 8 cm  LVLd A4C: 8 5 cm  LVLs A4C: 7 1 cm  LVPWd: 1 1 cm  RA Area: 17 5 cm2  RVIDd: 3 2 cm  SV MOD A4C: 59 ml  SV(Teich): 78 3 ml    CW  AR Dec Randall: 1 9 m/s2  AR Dec Time: 2208 1 ms  AR PHT: 640 4 ms  AR Vmax: 4 1 m/s  AR maxP 4 mmHg  TR Vmax: 2 5 m/s  TR maxP mmHg    MM  TAPSE: 1 4 cm    PW  AVC: 389 7 ms  E': 0 1 m/s  E/E': 15 3  MV A Anoop: 1 m/s  MV Dec Randall: 3 4 m/s2  MV DecT: 240 3 ms  MV E Anoop: 0 8 m/s  MV E/A Ratio: 0 8  MV PHT: 69 7 ms  MVA By PHT: 3 2 cm2    [employer] Accredited Echocardi[facility]    Prepared and electronically signed by    Ree Dumont MD  Signed 11-Dec-2017 12:09:00       No results found for this or any previous visit  No procedure found  No results found for this or any previous visit  Imaging: I have personally reviewed pertinent reports          EKG reviewed personally:   Sinus bradycardia with first-degree AV block    Telemetry reviewed personally:   Sinus bradycardia    Assessment/Plan:  1  Bradycardia, sinus with history of atrial fibrillation--heart rates down into the 40s  Decrease metoprolol  Continue with Cardizem at this time  Digoxin has been stopped  TSH 1 42  Monitor telemetry  Echocardiogram December 2017 EF 55-60%, no need to repeat  2   Hypertension--elevated at times  Continue monitoring closely  Continue all medications  3   Acute on chronic diastolic congestive heart failure--last known EF 55-60%  Continue with Lasix  Continue all other medications, aspirin, Lipitor, Lopressor  No Ace inhibitors or arbs given creatinine elevation    4  CAD with history of stents--stable  Continue all medications      Code Status: Level 3 - DNAR and DNI    Thank you for allowing us to participate in this patient's care  This pt will follow up with Dr Ivone Calvert once discharged  Counseling / Coordination of Care  Total floor / unit time spent today 35 minutes  Greater than 50% of total time was spent with the patient and / or family counseling and / or coordination of care  A description of the counseling / coordination of care: Review of history, current assessment, development of a plan  Jamilah Zamudio PA-C  6/18/2018,10:36 AM    Portions of the record may have been created with voice recognition software   Occasional wrong word or "sound a like" substitutions may have occurred due to the inherent limitations of voice recognition software   Read the chart carefully and recognize, using context, where substitutions have occurred

## 2018-06-18 NOTE — PHYSICAL THERAPY NOTE
Physical Therapy Treatment Note       06/18/18 1515   Pain Assessment   Pain Assessment No/denies pain   Pain Score No Pain   Restrictions/Precautions   Weight Bearing Precautions Per Order No   Braces or Orthoses (none per chart review)   Other Precautions Cognitive; Chair Alarm; Bed Alarm; Fall Risk   General   Chart Reviewed Yes   Response to Previous Treatment Patient with no complaints from previous session  Family/Caregiver Present No   Cognition   Overall Cognitive Status Impaired   Arousal/Participation Alert; Cooperative  (pleasant)   Attention Attends with cues to redirect   Orientation Level Oriented to person;Oriented to place; Disoriented to time;Disoriented to situation   Memory Decreased short term memory;Decreased recall of recent events;Decreased recall of precautions   Following Commands Follows one step commands with increased time or repetition   Comments pt agreeable to PT session  Pt with poor short term memory recall throughout session  Pt very forgetful throughout session, requires frequent cues for direction on task at hand  Subjective   Subjective "I want to go for a walk"   Bed Mobility   Supine to Sit Unable to assess   Additional Comments pt received seated OOB in recliner upon arrival   Transfers   Sit to Stand 5  Supervision   Additional items Assist x 1; Armrests; Increased time required;Verbal cues   Stand to Sit 5  Supervision   Additional items Assist x 1; Armrests; Increased time required;Verbal cues   Additional Comments vitals pre mobility: 127/61mmHg, 49bpm, 95% SpO2 on 2L O2 NC  pt not symptomatic per pt report  SpO2 sat on 2L O2 NC during mobility: 97-98%  Pt requiring vc for obstacle negotiation and cues to position self away from wall rail as tendency is for pt to navigate closely to such, concern would be for pt to pinch hand between RW and wall, educated pt on such- verbalized undersatnding   Pt also requiring cues to hold hands on lap when PCA repositioning recliner chair back into room  TARIQ Michaud notified of such  Ambulation/Elevation   Gait pattern Narrow JENNYFER; Decreased foot clearance; Inconsistent randi   Gait Assistance 4  Minimal assist  (SBA mostly- occasional CGA for safety, close chair follow of 2nd)   Additional items Assist x 1;Verbal cues; Tactile cues  (for RW managment, A of 2nd for O2 tank management and chair follow)   Assistive Device Rolling walker   Distance 150' x3   Stair Management Assistance 4  Minimal assist  (CGA)   Additional items Assist x 1;Verbal cues; Increased time required   Stair Management Technique Step to pattern; Foreward; With walker  (with RW support B UE, step up  step)   Number of Stairs 10  ( step x10 attempts)   Ramp Technique Not tested   Balance   Static Sitting Good   Dynamic Sitting Good   Static Standing Fair +   Dynamic Standing Fair   Ambulatory Fair   Endurance Deficit   Endurance Deficit Yes   Endurance Deficit Description no SOB/GRAF reported by pt, pt able to verbalize throughout gait training trials without issue noted  SpO2 on 2L O2 NC 97-98% during mobility, remained this reading following session   Activity Tolerance   Activity Tolerance Patient tolerated treatment well   Nurse Made Aware yes, TARIQ Michaud verbalized pt appropriate to see, made aware of session outcome/recs  Present to provide A during session, as well as PCA Bhakti   Exercises   Heelslides Sitting;10 reps;AROM; Bilateral   Hip Abduction Sitting;10 reps;AROM; Bilateral   Hip Adduction Sitting;10 reps;AROM; Bilateral   Knee AROM Long Arc Quad Sitting;10 reps;AROM; Bilateral   Ankle Pumps Sitting;10 reps;AROM; Bilateral   Marching Sitting;10 reps;AROM; Bilateral   Assessment   Prognosis Good   Problem List Decreased strength;Decreased endurance; Impaired balance;Decreased mobility; Decreased cognition;Decreased safety awareness   Assessment Pt seen for PT treatment session this date with interventions consisting of gait training w/ emphasis on improving pt's ability to ambulate level surfaces x 150' x3 ft (standing rest breaks between) with SBA- CGA provided by therapist with RW, Therapeutic exercise consisting of: AROM 10 reps B LE in sitting position and navigating 10  step trials with step to pattern with CGA  Pt agreeable to PT treatment session upon arrival, pt found seated OOB in recliner, in no apparent distress, A&O x 2 and responsive  In comparison to previous session, pt with improvements in gait training tolerance for several trials requiring standing rest breaks between, no LOB overtly observed, cues required for obstacle negotation to avoid pinching hand between RW and wall as pt tendency is to veer towards wall  Pt able to tolerate seated ther ex for 10 rep sets with cues required for technique  Pt able to ascend/descend  step x10 trials without difficulty or need for external A  CGA only provided throughout phases of mobility for safety  Pt denying any SOB/GRAF throughout duration of session  Post session: pt returned back to recliner, chair alarm engaged, all needs in reach and RN notified of session findings/recommendations and NSG staff educated/encouraged to mobilize pt A of 1 with RW and ambulate ad neema to maximize endurance gains  Recommend return to previous facility with staff support and HHPT at time of d/c in order to maximize pt's functional independence and safety w/ mobility  Note if facility unable to provide assistance- will need to consider STR  Pt continues to be functioning below baseline level, and remains limited 2* factors listed above  PT will continue to see pt while here in order to address the deficits listed above and provide interventions consistent w/ POC in effort to achieve STGs     Barriers to Discharge None   Goals   Patient Goals to go back to [facility]   STG Expiration Date 06/27/18   Short Term Goal #1 STGs remain appropriate   Treatment Day 1   Plan   Treatment/Interventions Functional transfer training;LE strengthening/ROM; Elevations; Therapeutic exercise; Endurance training;Patient/family training;Bed mobility;Gait training;Spoke to nursing   Progress Progressing toward goals   PT Frequency 5x/wk   Recommendation   Recommendation Home PT; Home with family support  (pending PAULO's ability to continue care for pt)   Equipment Recommended Walker  (continue with RW)   PT - OK to Discharge Yes  (when medically cleared, see rec above)       Adarsh Hines, PT, DPT    Time of PT treatment session: 2859-5238

## 2018-06-18 NOTE — ASSESSMENT & PLAN NOTE
Present on admission, pre-hospital   Patient has history of COPD, Congestive Heart Failure O2 dependent  Currently living at [facility] personal care facility  Family report the facility is not able to coordinate for patient to receive home oxygen at this time  No acute distress noted  Respiratory protocol with p r n  neb treatments  O2 at 2 L  Case management consulted to assist with coordination of O2 to be transferred or delivered to patient's personal care home

## 2018-06-19 VITALS
HEART RATE: 55 BPM | RESPIRATION RATE: 18 BRPM | HEIGHT: 62 IN | BODY MASS INDEX: 34.08 KG/M2 | DIASTOLIC BLOOD PRESSURE: 99 MMHG | TEMPERATURE: 98.2 F | WEIGHT: 185.19 LBS | SYSTOLIC BLOOD PRESSURE: 142 MMHG | OXYGEN SATURATION: 96 %

## 2018-06-19 LAB
INR PPP: 2.74 (ref 0.86–1.17)
NT-PROBNP SERPL-MCNC: 801 PG/ML
PROTHROMBIN TIME: 28.6 SECONDS (ref 11.8–14.2)

## 2018-06-19 PROCEDURE — 83880 ASSAY OF NATRIURETIC PEPTIDE: CPT | Performed by: PHYSICIAN ASSISTANT

## 2018-06-19 PROCEDURE — 99239 HOSP IP/OBS DSCHRG MGMT >30: CPT | Performed by: INTERNAL MEDICINE

## 2018-06-19 PROCEDURE — 85610 PROTHROMBIN TIME: CPT | Performed by: FAMILY MEDICINE

## 2018-06-19 PROCEDURE — 99232 SBSQ HOSP IP/OBS MODERATE 35: CPT | Performed by: INTERNAL MEDICINE

## 2018-06-19 RX ORDER — WARFARIN SODIUM 5 MG/1
5 TABLET ORAL
Status: DISCONTINUED | OUTPATIENT
Start: 2018-06-19 | End: 2018-06-19 | Stop reason: HOSPADM

## 2018-06-19 RX ORDER — METOPROLOL TARTRATE 50 MG/1
50 TABLET, FILM COATED ORAL EVERY 12 HOURS SCHEDULED
Qty: 60 TABLET | Refills: 0 | Status: ON HOLD | OUTPATIENT
Start: 2018-06-19 | End: 2018-12-14

## 2018-06-19 RX ADMIN — FERROUS SULFATE TAB 325 MG (65 MG ELEMENTAL FE) 325 MG: 325 (65 FE) TAB at 08:03

## 2018-06-19 RX ADMIN — ALLOPURINOL 100 MG: 100 TABLET ORAL at 08:02

## 2018-06-19 RX ADMIN — FERROUS SULFATE TAB 325 MG (65 MG ELEMENTAL FE) 325 MG: 325 (65 FE) TAB at 17:03

## 2018-06-19 RX ADMIN — CHOLECALCIFEROL TAB 25 MCG (1000 UNIT) 2000 UNITS: 25 TAB at 08:02

## 2018-06-19 RX ADMIN — ATORVASTATIN CALCIUM 10 MG: 10 TABLET, FILM COATED ORAL at 17:03

## 2018-06-19 RX ADMIN — FUROSEMIDE 60 MG: 40 TABLET ORAL at 08:02

## 2018-06-19 RX ADMIN — WARFARIN SODIUM 5 MG: 5 TABLET ORAL at 17:03

## 2018-06-19 RX ADMIN — DILTIAZEM HYDROCHLORIDE 180 MG: 90 CAPSULE, EXTENDED RELEASE ORAL at 12:10

## 2018-06-19 RX ADMIN — BUPROPION HYDROCHLORIDE 150 MG: 150 TABLET, FILM COATED, EXTENDED RELEASE ORAL at 08:02

## 2018-06-19 RX ADMIN — LEVOTHYROXINE SODIUM 88 MCG: 88 TABLET ORAL at 05:12

## 2018-06-19 RX ADMIN — CARBAMAZEPINE 100 MG: 100 TABLET, CHEWABLE ORAL at 17:03

## 2018-06-19 RX ADMIN — DIVALPROEX SODIUM 250 MG: 250 TABLET, FILM COATED, EXTENDED RELEASE ORAL at 08:02

## 2018-06-19 RX ADMIN — POTASSIUM CHLORIDE 20 MEQ: 1500 TABLET, EXTENDED RELEASE ORAL at 08:03

## 2018-06-19 RX ADMIN — CARBAMAZEPINE 100 MG: 100 TABLET, CHEWABLE ORAL at 08:03

## 2018-06-19 RX ADMIN — FLUTICASONE PROPIONATE AND SALMETEROL 1 PUFF: 50; 250 POWDER RESPIRATORY (INHALATION) at 08:06

## 2018-06-19 RX ADMIN — FENOFIBRATE 48 MG: 48 TABLET, FILM COATED ORAL at 08:02

## 2018-06-19 RX ADMIN — METOPROLOL TARTRATE 50 MG: 50 TABLET ORAL at 08:03

## 2018-06-19 RX ADMIN — ASPIRIN 81 MG: 81 TABLET, COATED ORAL at 08:03

## 2018-06-19 NOTE — PROGRESS NOTES
General Cardiology   Progress Note   Kirby Connolly 71 y o  female MRN: [id number]  Unit/Bed#: -01 Encounter: [id number]      SUBJECTIVE:   No significant events overnight  Digoxin stopped, BB decreased  No chest pain, SOB, fatigue, palpitations, leg swelling, syncope  OBJECTIVE:   Vitals:  Vitals:    06/19/18 0700   BP: 164/77   Pulse: (!) 52   Resp: 18   Temp: 97 6 °F (36 4 °C)   SpO2: 95%     Body mass index is 33 87 kg/m²  Systolic (37GME), RYF:819 , Min:127 , DBI:083     Diastolic (04TWW), VUF:43, Min:60, Max:77      Intake/Output Summary (Last 24 hours) at 06/19/18 1036  Last data filed at 06/19/18 0810   Gross per 24 hour   Intake              180 ml   Output             1050 ml   Net             -870 ml     Weight (last 2 days)     Date/Time   Weight    06/17/18 0029  84 (185 19)              Telemetry Review: Sinus bradycardia  PHYSICAL EXAMS:  General:  Patient is not in acute distress, laying in the bed comfortably, awake, alert responding to commands  Head: Normocephalic, Atraumatic  HEENT:  Both pupils normal-size atraumatic, normocephalic, nonicteric  Neck:  JVP not raised  Trachea central  Respiratory:  Bronchovascular breathing all over the chest without any accompaniment  Cardiovascular:  RRR  No murmurs, rubs, gallops  GI:  Abdomen soft nontender   Liver and spleen normal size  Lymphatic:  No cervical or inguinal lymphadenopathy  Neurologic:  Patient is awake alert, responding to command, well-oriented to time and place and person moving     LABORATORY RESULTS:    Results from last 7 days  Lab Units 06/16/18 1953   TROPONIN I ng/mL <0 02       CBC with diff:   Results from last 7 days  Lab Units 06/16/18 1953   WBC Thousand/uL 5 94   HEMOGLOBIN g/dL 12 0   HEMATOCRIT % 36 4   MCV fL 103*   PLATELETS Thousands/uL 148*   MCH pg 34 0   MCHC g/dL 33 0   RDW % 14 1   MPV fL 11 4   NRBC AUTO /100 WBCs 0       CMP:  Results from last 7 days  Lab Units 06/18/18  0505 06/17/18  5674 18   SODIUM mmol/L 143 143 141   POTASSIUM mmol/L 3 9 3 8 3 6   CHLORIDE mmol/L 106 106 104   CO2 mmol/L 32 32 30   ANION GAP mmol/L 5 5 7   BUN mg/dL 29* 27* 30*   CREATININE mg/dL 1 44* 1 48* 1 74*   GLUCOSE RANDOM mg/dL 124 173* 174*   CALCIUM mg/dL 9 2 9 4 9 4   AST U/L  --   --  28   ALT U/L  --   --  44   ALK PHOS U/L  --   --  68   TOTAL PROTEIN g/dL  --   --  7 9   BILIRUBIN TOTAL mg/dL  --   --  0 30   EGFR ml/min/1 73sq m 37 35 29       BMP:  Results from last 7 days  Lab Units 18  0505 18  0921 18   SODIUM mmol/L 143 143 141   POTASSIUM mmol/L 3 9 3 8 3 6   CHLORIDE mmol/L 106 106 104   CO2 mmol/L 32 32 30   BUN mg/dL 29* 27* 30*   CREATININE mg/dL 1 44* 1 48* 1 74*   GLUCOSE RANDOM mg/dL 124 173* 174*   CALCIUM mg/dL 9 2 9 4 9 4         Results from last 7 days  Lab Units 18  0503   NT-PRO BNP pg/mL 801*                    Results from last 7 days  Lab Units 18  0503 18  0505 18  0458   INR  2 74* 1 71* 1 62*       Lipid Profile:   No results found for: CHOL  No results found for: HDL  No results found for: LDLCALC  No results found for: TRIG    Cardiac testing:  Results for orders placed during the hospital encounter of 12/10/17   Echo complete with contrast if indicated    Narrative [address] [place] [address]  [phone]    Transthoracic Echocardiogram  2D, M-mode, Doppler, and Color Doppler    Study date:  11-Dec-2017    Patient: Mandy Wilson  MR number: [id number]  Account number: [de-identified]  : 1945  Age: 71 years  Gender: Female  Status: Inpatient  Location: Bedside  Height: 60 in  Weight: 192 lb  BP: 99/ 50 mmHg    Indications: Shortness of breath      Diagnoses: R06 00 - Dyspnea, unspecified, R06 02 - Shortness of breath    Sonographer:   [facility] [place]  Interpreting Physician:  Con Oscar MD  Referring Physician:  Melba Chawla PA-C  Group:  [facility]    SUMMARY    LEFT VENTRICLE:  Systolic function was normal  Ejection fraction was estimated in the range of 55 % to 65 %  There were no regional wall motion abnormalities  There was mild concentric hypertrophy  Doppler parameters were consistent with abnormal left ventricular relaxation (grade 1 diastolic dysfunction)  MITRAL VALVE:  There was mild annular calcification  There was mild regurgitation  AORTIC VALVE:  There was mild regurgitation  TRICUSPID VALVE:  There was mild regurgitation  PULMONIC VALVE:  There was mild regurgitation  HISTORY: PRIOR HISTORY: Elevated trponin, Dementia, Seizures, Stroke, Myocardial infarction  Congestive heart failure  Risk factors: hypertension and hypercholesterolemia  Chronic lung disease  PROCEDURE: The procedure was performed at the bedside  This was a routine study  The transthoracic approach was used  The study included complete 2D imaging, M-mode, complete spectral Doppler, and color Doppler  The heart rate was 68 bpm,  at the start of the study  Images were obtained from the parasternal, apical, subcostal, and suprasternal notch acoustic windows  Echocardiographic views were limited due to poor acoustic window availability, decreased penetration, and  lung interference  This was a technically difficult study  LEFT VENTRICLE: Size was normal  Systolic function was normal  Ejection fraction was estimated in the range of 55 % to 65 %  There were no regional wall motion abnormalities  Wall thickness was normal  There was mild concentric  hypertrophy  DOPPLER: Doppler parameters were consistent with abnormal left ventricular relaxation (grade 1 diastolic dysfunction)  RIGHT VENTRICLE: The size was normal  Systolic function was normal  Wall thickness was normal     LEFT ATRIUM: Size was normal     RIGHT ATRIUM: Size was normal     MITRAL VALVE: There was mild annular calcification   Valve structure was normal  There was normal leaflet separation  DOPPLER: The transmitral velocity was within the normal range  There was no evidence for stenosis  There was mild  regurgitation  AORTIC VALVE: The valve was trileaflet  Leaflets exhibited normal thickness and normal cuspal separation  DOPPLER: Transaortic velocity was within the normal range  There was no evidence for stenosis  There was mild regurgitation  TRICUSPID VALVE: The valve structure was normal  There was normal leaflet separation  DOPPLER: The transtricuspid velocity was within the normal range  There was no evidence for stenosis  There was mild regurgitation  PULMONIC VALVE: Leaflets exhibited normal thickness, no calcification, and normal cuspal separation  DOPPLER: The transpulmonic velocity was within the normal range  There was mild regurgitation  PERICARDIUM: There was no pericardial effusion  The pericardium was normal in appearance  AORTA: The root exhibited normal size      SYSTEM MEASUREMENT TABLES    2D  %FS: 29 2 %  Ao Diam: 3 1 cm  EDV(Teich): 140 8 ml  EF(Teich): 55 6 %  ESV(Teich): 62 5 ml  IVSd: 1 1 cm  LA Area: 20 9 cm2  LA Diam: 3 7 cm  LVEDV MOD A4C: 117 1 ml  LVEF MOD A4C: 50 3 %  LVESV MOD A4C: 58 2 ml  LVIDd: 5 4 cm  LVIDs: 3 8 cm  LVLd A4C: 8 5 cm  LVLs A4C: 7 1 cm  LVPWd: 1 1 cm  RA Area: 17 5 cm2  RVIDd: 3 2 cm  SV MOD A4C: 59 ml  SV(Teich): 78 3 ml    CW  AR Dec [place]: 1 9 m/s2  AR Dec Time: 2208 1 ms  AR PHT: 640 4 ms  AR Vmax: 4 1 m/s  AR maxP 4 mmHg  TR Vmax: 2 5 m/s  TR maxP mmHg    MM  TAPSE: 1 4 cm    PW  AVC: 389 7 ms  E': 0 1 m/s  E/E': 15 3  MV A Anoop: 1 m/s  MV Dec [place]: 3 4 m/s2  MV DecT: 240 3 ms  MV E Anoop: 0 8 m/s  MV E/A Ratio: 0 8  MV PHT: 69 7 ms  MVA By PHT: 3 2 cm2    [employer] Accredited Echocardiography Laboratory    Prepared and electronically signed by    Inocencia Acosta MD  Signed 11-Dec-2017 12:09:00         Meds/Allergies   all current active meds have been reviewed and current meds:   Current Facility-Administered Medications   Medication Dose Route Frequency    acetaminophen (TYLENOL) tablet 488 mg  488 mg Oral Q6H PRN    albuterol inhalation solution 2 5 mg  2 5 mg Nebulization Q6H PRN    allopurinol (ZYLOPRIM) tablet 100 mg  100 mg Oral Daily    aspirin (ECOTRIN LOW STRENGTH) EC tablet 81 mg  81 mg Oral Daily    atorvastatin (LIPITOR) tablet 10 mg  10 mg Oral Daily With Dinner    buPROPion [facility] SR) 12 hr tablet 150 mg  150 mg Oral Daily    carBAMazepine (TEGretol) chewable tablet 100 mg  100 mg Oral TID    cholecalciferol (VITAMIN D3) tablet 2,000 Units  2,000 Units Oral Daily    diltiazem (CARDIZEM SR) 12 hr capsule 180 mg  180 mg Oral Once    diltiazem (CARDIZEM SR) 12 hr capsule 180 mg  180 mg Oral Q12H TONI    divalproex sodium (DEPAKOTE ER) 24 hr tablet 250 mg  250 mg Oral Daily    fenofibrate (TRICOR) tablet 48 mg  48 mg Oral Daily    ferrous sulfate tablet 325 mg  325 mg Oral BID    fluticasone-salmeterol (ADVAIR) 250-50 mcg/dose inhaler 1 puff  1 puff Inhalation Q12H [facility]    furosemide (LASIX) tablet 60 mg  60 mg Oral Daily    levothyroxine tablet 88 mcg  88 mcg Oral Early Morning    metoprolol tartrate (LOPRESSOR) tablet 50 mg  50 mg Oral Q12H [facility]    potassium chloride (K-DUR,KLOR-CON) CR tablet 20 mEq  20 mEq Oral Daily    warfarin (COUMADIN) tablet 7 5 mg  7 5 mg Oral Daily (warfarin)     Prescriptions Prior to Admission   Medication    albuterol (PROVENTIL HFA,VENTOLIN HFA) 90 mcg/act inhaler    allopurinol (ZYLOPRIM) 100 mg tablet    allopurinol (ZYLOPRIM) 300 mg tablet    aspirin (ECOTRIN LOW STRENGTH) 81 mg EC tablet    atorvastatin (LIPITOR) 10 mg tablet    buPROPion (WELLBUTRIN SR) 150 mg 12 hr tablet    carBAMazepine (TEGretol) 100 mg chewable tablet    Cholecalciferol (VITAMIN D) 2000 units CAPS    digoxin (LANOXIN) 0 125 mg tablet    diltiazem (CARDIZEM SR) 90 mg 12 hr capsule    divalproex sodium (DEPAKOTE ER) 250 mg 24 hr tablet    fenofibrate micronized (LOFIBRA) 67 MG capsule    ferrous sulfate 325 (65 Fe) mg tablet    fluticasone furoate-vilanterol (BREO ELLIPTA)    furosemide (LASIX) 20 mg tablet    Levothyroxine Sodium 88 MCG CAPS    metoprolol tartrate 75 MG TABS    potassium chloride (K-DUR,KLOR-CON) 20 mEq tablet    warfarin (COUMADIN) 5 mg tablet    acetaminophen (TYLENOL) 500 mg tablet    albuterol (2 5 mg/3 mL) 0 083 % nebulizer solution    enoxaparin (LOVENOX) 100 mg/mL    nitroglycerin (NITROSTAT) 0 4 mg SL tablet    Sennosides-Docusate Sodium (SENNA PLUS PO)          ASSESSMENT & PLAN   1  Sinus bradycardia:  Heart rates currently 50s  Digoxin stopped, metoprolol decreased  Continue all other medications  TSH 1 42  Continue to monitor  Echo December 2017 shows EF 55-65%, no repeat to repeat  2   Hypertension:  Stable, continue present regimen  3   Acute on chronic diastolic Congestive heart failure:  Euvolemic today  Last EF 55-65%  Continue Lasix, Lopressor  No ACEI/ARB given elevated creatinine  Low-salt diet, daily weights, I/O     4   CAD S/P stents:  No anginal symptoms  Continue aspirin, statin, beta-blocker  Amador Guy PA-C  6/19/2018,10:36 AM    Portions of the record may have been created with voice recognition software   Occasional wrong word or "sound a like" substitutions may have occurred due to the inherent limitations of voice recognition software   Read the chart carefully and recognize, using context, where substitutions have occurred

## 2018-06-19 NOTE — PROGRESS NOTES
[address] [place] [address] was called to give report and no one answered  AVS for facility was printed out and given to the patient's son-in-law and was told to give it to the facility

## 2018-06-19 NOTE — ASSESSMENT & PLAN NOTE
Patient's INR subtherapeutic  Will give 7 5 mg of Coumadin on 06/18/2018  INR this a m  2 75  Will recommend discharge Coumadin at 5 mg  Would recommend follow-up PT INR in the next 24-48 hours  Discussed with the patient's primary care physician

## 2018-06-19 NOTE — ASSESSMENT & PLAN NOTE
Present on admission, pre-hospital   Patient has history of COPD, Congestive Heart Failure O2 dependent  Currently living at [facility] personal care facility  Family report the facility is not able to coordinate for patient to receive home oxygen at this time  No acute distress noted  Respiratory protocol with p r n  neb treatments  O2 at 2 L  Case management consulted to assist with coordination of O2 to be transferred or delivered to patient's personal care home

## 2018-06-19 NOTE — ASSESSMENT & PLAN NOTE
Patient is still bradycardic  However she is on a lot of medications which are contributing  I did stop the patient's digoxin yesterday  Cardiology recommended decreasing metoprolol to 50 mg q 12 hours  Continue her Cardizem  q  12 hours

## 2018-06-19 NOTE — SOCIAL WORK
Patient received O2 tank from [facility] which is at bedside  Cm spoke with Carlos Adam at [facility] has coordinated O2 delivery to home with them  Per Carlos Adam pt was going to be seen by Greg Simmons for [facility] prior to hospital admission  Spoke with Brannon Guadalupe at [facility] who reports pt is currently open with them  Pt and daughter would like to continue with Greg Juarez [facility] updated via [facility]  Daughter updated regarding O2 delivery and that O2 concentrator will be delivered to home  Simeon Fountain reports her  works until [facility] but will be able to transport home after that  She requests that all calls go through her and she will coordinate transport home with her   Discussed with RN and SLIM  Cm will continue to follow through discharge for further needs

## 2018-06-19 NOTE — PLAN OF CARE
DISCHARGE PLANNING     Discharge to home or other facility with appropriate resources Progressing        DISCHARGE PLANNING - CARE MANAGEMENT     Discharge to post-acute care or home with appropriate resources Progressing        INFECTION - ADULT     Absence or prevention of progression during hospitalization Progressing        Knowledge Deficit     Patient/family/caregiver demonstrates understanding of disease process, treatment plan, medications, and discharge instructions Progressing        Potential for Falls     Patient will remain free of falls Progressing        Prexisting or High Potential for Compromised Skin Integrity     Skin integrity is maintained or improved Progressing        RESPIRATORY - ADULT     Achieves optimal ventilation and oxygenation Progressing        SAFETY ADULT     Maintain or return to baseline ADL function Progressing     Maintain or return mobility status to optimal level Progressing

## 2018-06-19 NOTE — DISCHARGE SUMMARY
Discharge- Theoiman Shown [birth year], 71 y o  female MRN: [id number]    Unit/Bed#: -01 Encounter: [id number]    Primary Care Provider: Jair Marvin DO   Date and time admitted to hospital: 6/16/2018  6:54 PM        COPD (chronic obstructive pulmonary disease) ([facility] )   Assessment & Plan    O2 dependent  No acute exacerbation noted  Continue home inhalers  Respiratory protocol with p r n  neb treatments  Patient will need oxygen on discharge  She does not have this available to her at the assisted living facility  Will discuss with case management to make sure this set up  She did qualify and did ambulate with respiratory  Atrial fibrillation ([facility] )   Assessment & Plan    Patient's INR subtherapeutic  Will give 7 5 mg of Coumadin on 06/18/2018  INR this a m 06/19/2018 2 75  Will recommend discharge Coumadin at 5 mg  Would recommend follow-up PT INR in the next 24-48 hours  Discussed with the patient's primary care physician  Acute on chronic diastolic congestive heart failure ([facility] )   Assessment & Plan    As evidence by chest x-ray showing mild pulmonary vascular congestion, BNP was  Assessed and slightly elevated but significantly better than last admission when it was in the 3000  patient now clinically appears euvolemic  Chronic, O2 dependent  For dyspnea  Monitor intake and output, low-sodium diet encouraged  Patient appears to be euvolemic  Continue Lasix  Follow up with primary care physician and Cardiology as needed  HTN (hypertension)   Assessment & Plan    Blood pressure elevated on admission  Continue metoprolol, Cardizem and Lasix  Metoprolol decreased due to bradycardia  Digoxin discontinued due to bradycardia         Bradycardia   Assessment & Plan    Patient is still bradycardic  However she is on a lot of medications which are contributing  I did stop the patient's digoxin yesterday    Cardiology recommended decreasing metoprolol to 50 mg q 12 hours  Continue her Cardizem  q  12 hours  Dementia   Assessment & Plan    Patient is alert  Continue to redirect  Monitor for safety  This appears to be mild        Stage 3 chronic kidney disease   Assessment & Plan    Acute on chronic  Monitor nephrotoxin agents  Monitor creatinine  Chronic indwelling Davis catheter   Assessment & Plan    Davis care  This is chronic        * SOB (shortness of breath)   Assessment & Plan    Present on admission, pre-hospital   Patient has history of COPD, Congestive Heart Failure O2 dependent  Currently living at [facility] personal care facility  Family report the facility is not able to coordinate for patient to receive home oxygen at this time  No acute distress noted  Respiratory protocol with p r n  neb treatments  O2 at 2 L  Case management consulted to assist with coordination of O2 to be transferred or delivered to patient's personal care home  Discharging Physician / Practitioner: Zoey Markham MD  PCP: Lorenzo Young DO  Admission Date:   Admission Orders     Ordered        06/17/18 2224  Inpatient Admission  Once         06/16/18 2319  Place in Observation (expected length of stay for this patient is less than two midnights)  Once             Discharge Date: 06/19/18    Resolved Problems  Date Reviewed: 6/18/2018    None          Consultations During Hospital Stay:  · Cardiology    Procedures Performed:     · Chest x-ray:  Shows mild pulmonary vascular congestion    Significant Findings / Test Results:     · Discharge INR 2 7 4  · ProBNP 801  · Creatinine 1 44  · Troponin negative  · Discharge hemoglobin 12 0  ·     Incidental Findings:   · None    Test Results Pending at Discharge (will require follow up): · None     Outpatient Tests Requested:  · None-discussed with patient's primary care physician regarding following up INR    Complications:  None    Reason for Admission:  Shortness of breath    Hospital Course:      Nereida Muro Lea Swann is a 71 y o  female patient who originally presented to the hospital on 6/16/2018 due to shortness of breath  Patient's family were having difficulty having the patient reassess for continuous oxygen therapy  Patient was recently admitted to the hospital and did not qualify for continuous oxygen therapy despite chronic COPD  Patient was found to have slight vascular congestion consistent with acute on chronic diastolic heart failure  She was continued on her regular dose of Lasix  She was noted to be bradycardic and therefore medications were adjusted including discontinuing digoxin, and decreasing metoprolol down to 50 mg b i d  She will continue on her Cardizem 180 mg daily, and Lasix  On the day of discharge the patient is sitting in the chair no acute distress  She did ambulate with respiratory who found her to have oxygen desaturation of 86% during exercise  At rest generally the patient ranges from 93% to 95%  With a L of oxygen her oxygen saturations during exercise or 91%  Please see above list of diagnoses and related plan for additional information  Condition at Discharge: good     Discharge Day Visit / Exam:     Subjective:  Patient is sitting in chair anxious to go home  States she feels well  Denies shortness of breath at rest   Discussed the case with the patient's daughter via phone  And the patient's primary care physician    Vitals: Blood Pressure: 142/99 (06/19/18 1500)  Pulse: 55 (06/19/18 1500)  Temperature: 98 2 °F (36 8 °C) (06/19/18 1500)  Temp Source: Oral (06/19/18 1500)  Respirations: 18 (06/19/18 1500)  Height: 5' 2" (157 5 cm) (06/17/18 0029)  Weight - Scale: 84 kg (185 lb 3 oz) (06/17/18 0029)  SpO2: 96 % (06/19/18 1500)  Exam:   Physical Exam  General well-developed reasonably nourished female no acute distress sitting in the chair she is normocephalic atraumatic pupils equal round and reactive to light extraocular muscles appear intact mucous membranes are moist neck is supple there is no JVD no lymph nodes no carotid bruits chest is decreased with poor air entry there is no rhonchi rales or wheezes  Cardiovascular regular rate rhythm positive S1 and S2 heard appreciate an S3 and S4  Extremities no clubbing cyanosis no edema neurologically the patient is awake alert oriented cranial nerves 2-12 appear to be intact she does have some memory deficits that with early dementia  Discussion with Family:  Updated daughter via phone  She is in [place] but will call her spouse to, and transport the patient to her personal care home    Discharge instructions/Information to patient and family:   See after visit summary for information provided to patient and family  Provisions for Follow-Up Care:  See after visit summary for information related to follow-up care and any pertinent home health orders  Disposition:     Group Home / [place] [facility] to [facility]:   · Not Applicable to this Patient - Not Applicable to this Patient    Planned Readmission:  None     Discharge Statement:  I spent 40 minutes discharging the patient  Discussed with patient's primary care physician and daughter This time was spent on the day of discharge  I had direct contact with the patient on the day of discharge  Greater than 50% of the total time was spent examining patient, answering all patient questions, arranging and discussing plan of care with patient as well as directly providing post-discharge instructions  Additional time then spent on discharge activities  Discharge Medications:  See after visit summary for reconciled discharge medications provided to patient and family        ** Please Note: This note has been constructed using a voice recognition system **

## 2018-06-19 NOTE — PLAN OF CARE
Problem: DISCHARGE PLANNING - CARE MANAGEMENT  Goal: Discharge to post-acute care or home with appropriate resources  INTERVENTIONS:  - Conduct assessment to determine patient/family and health care team treatment goals, and need for post-acute services based on payer coverage, community resources, and patient preferences, and barriers to discharge  - Address psychosocial, clinical, and financial barriers to discharge as identified in assessment in conjunction with the patient/family and health care team  - Arrange appropriate level of post-acute services according to patients   needs and preference and payer coverage in collaboration with the physician and health care team  - Communicate with and update the patient/family, physician, and health care team regarding progress on the discharge plan  - Arrange appropriate transportation to post-acute venues   Outcome: Progressing  Patient received O2 tank from [employer] which is at bedside  Cm spoke with Danelle Goodwin at [facility] has coordinated O2 delivery to home with them  Per Danelle Goodwin pt was going to be seen by [id number] [facility] [address] for Gilberto Clarke prior to hospital admission  Spoke with Cheyanne Sanders at [address] [place] [address] [address] who reports pt is currently open with them  Pt and daughter would like to continue with [address] [place] [address] [address]  Larry Gilberto Clarke updated via [employer]  Daughter updated regarding O2 delivery and that O2 concentrator will be delivered to home       Nisa Roger reports her  works until [facility] but will be able to transport home after that  She requests that all calls go through her and she will coordinate transport home with her   Discussed with RN and SLIM   Cm will continue to follow through discharge for further needs

## 2018-06-19 NOTE — ASSESSMENT & PLAN NOTE
As evidence by chest x-ray showing mild pulmonary vascular congestion, BNP was not assessed during this visit but patient now clinically appears euvolemic  Chronic, O2 dependent  For dyspnea  Monitor intake and output, low-sodium diet encouraged  Patient appears to be euvolemic  Continue Lasix  Follow up with primary care physician and Cardiology as needed

## 2018-06-19 NOTE — ASSESSMENT & PLAN NOTE
Blood pressure elevated on admission  Continue metoprolol, Cardizem and Lasix  Metoprolol decreased due to bradycardia    Digoxin discontinued due to bradycardia

## 2018-06-21 ENCOUNTER — OFFICE VISIT (OUTPATIENT)
Dept: CARDIOLOGY CLINIC | Facility: CLINIC | Age: 73
End: 2018-06-21
Payer: MEDICARE

## 2018-06-21 VITALS
OXYGEN SATURATION: 94 % | HEIGHT: 62 IN | DIASTOLIC BLOOD PRESSURE: 74 MMHG | HEART RATE: 52 BPM | BODY MASS INDEX: 33.13 KG/M2 | WEIGHT: 180 LBS | SYSTOLIC BLOOD PRESSURE: 126 MMHG

## 2018-06-21 DIAGNOSIS — I50.32 CHRONIC DIASTOLIC CONGESTIVE HEART FAILURE (HCC): ICD-10-CM

## 2018-06-21 DIAGNOSIS — R06.02 SOB (SHORTNESS OF BREATH): ICD-10-CM

## 2018-06-21 DIAGNOSIS — E78.2 MIXED HYPERLIPIDEMIA: ICD-10-CM

## 2018-06-21 DIAGNOSIS — R00.1 BRADYCARDIA: ICD-10-CM

## 2018-06-21 DIAGNOSIS — I25.119 CORONARY ARTERY DISEASE WITH ANGINA PECTORIS, UNSPECIFIED VESSEL OR LESION TYPE, UNSPECIFIED WHETHER NATIVE OR TRANSPLANTED HEART (HCC): Primary | ICD-10-CM

## 2018-06-21 DIAGNOSIS — I73.9 PAD (PERIPHERAL ARTERY DISEASE) (HCC): ICD-10-CM

## 2018-06-21 DIAGNOSIS — I48.91 ATRIAL FIBRILLATION, UNSPECIFIED TYPE (HCC): ICD-10-CM

## 2018-06-21 DIAGNOSIS — I70.213 INTERMITTENT CLAUDICATION OF BOTH LOWER EXTREMITIES DUE TO ATHEROSCLEROSIS (HCC): ICD-10-CM

## 2018-06-21 DIAGNOSIS — I73.9 CLAUDICATION (HCC): ICD-10-CM

## 2018-06-21 DIAGNOSIS — I10 ESSENTIAL HYPERTENSION: Chronic | ICD-10-CM

## 2018-06-21 DIAGNOSIS — R60.0 BILATERAL LEG EDEMA: ICD-10-CM

## 2018-06-21 DIAGNOSIS — I50.33 ACUTE ON CHRONIC DIASTOLIC CONGESTIVE HEART FAILURE (HCC): ICD-10-CM

## 2018-06-21 DIAGNOSIS — I48.92 ATRIAL FLUTTER, UNSPECIFIED TYPE (HCC): ICD-10-CM

## 2018-06-21 PROCEDURE — 99214 OFFICE O/P EST MOD 30 MIN: CPT | Performed by: INTERNAL MEDICINE

## 2018-06-21 RX ORDER — FUROSEMIDE 40 MG/1
40 TABLET ORAL 2 TIMES DAILY
Qty: 90 TABLET | Refills: 3 | Status: SHIPPED | OUTPATIENT
Start: 2018-06-21 | End: 2018-06-22 | Stop reason: SDUPTHER

## 2018-06-21 NOTE — PROGRESS NOTES
[facility] [place]  [address] [facility]  [address]  Cardiology Consultation     Emily Oliveira  [id number]  1945      1  Coronary artery disease with angina pectoris, unspecified vessel or lesion type, unspecified whether native or transplanted heart [facility])  Basic metabolic panel   2  Atrial fibrillation, unspecified type ([place] [address] )     3  Chronic diastolic congestive heart failure (HCC)  furosemide (LASIX) 40 mg tablet    Basic metabolic panel   4  Essential hypertension     5  Mixed hyperlipidemia     6  PAD (peripheral artery disease) ([place] [address] )     7  Claudication [facility])         Chief Complaint   Patient presents with    Follow-up     hospital [facility] - SOB       HPI:  Patient with history of CAD, A Fib on Coumadin, chronic diastolic CHF, HTN, HLD, PAD presents for follow up visit as well as hospital follow up  Was recently hospitalized for acute CHF exacerbation and bradycardia  Initially scheduled to discuss lower extremity US for claudication  States that she is chronically SOB, on oxygen  Has occasional fluttering sensation in chest  Denies chest pain, lightheadedness  Still has claudication   Lower extremity US abnormal      Patient Active Problem List   Diagnosis    HTN (hypertension)    COPD (chronic obstructive pulmonary disease) ([place] [address] )    Acute on chronic diastolic congestive heart failure (HCC)    Acute on chronic respiratory failure (HCC)    Atrial flutter (HCC)    Back pain    Dementia    HLD (hyperlipidemia)    UTI (urinary tract infection)    Seizure disorder ([place] [address] )    RSV infection    Ambulatory dysfunction    Hypernatremia    Stage 3 chronic kidney disease    Bilateral leg edema    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    JESENIA (obstructive sleep apnea)    Intermittent claudication of both lower extremities due to atherosclerosis (HCC)    Atrial fibrillation (HCC)    SOB (shortness of breath)    Chronic indwelling Davis catheter    Bradycardia  Chronic hypoxemic respiratory failure (HCC)     Past Medical History:   Diagnosis Date    Atrial fibrillation ([facility] )     Brain aneurysm     CAD (coronary artery disease)     Cardiac disease     had stents 12 years ago in [address] [place], [place] [address] CHF (congestive heart failure) ([facility])     CKD (chronic kidney disease)     COPD (chronic obstructive pulmonary disease) ([facility])     Dementia     Dementia     Disease of thyroid gland     Hyperlipidemia     Hyperlipidemia     Hypertension     MI (myocardial infarction) ([facility] )     Migraine     Renal disorder     Seizures ([facility] )     Stroke ([facility] )      Social History     Social History    Marital status:      Spouse name: N/A    Number of children: N/A    Years of education: N/A     Occupational History    Not on file       Social History Main Topics    Smoking status: Former Smoker    Smokeless tobacco: Never Used    Alcohol use No    Drug use: No    Sexual activity: Not on file     Other Topics Concern    Not on file     Social History Narrative    No narrative on file      Family History   Problem Relation Age of Onset    Heart disease Father      Past Surgical History:   Procedure Laterality Date    APPENDECTOMY      BLADDER TUMOR EXCISION      BRAIN SURGERY      CARDIAC SURGERY      CORONARY STENT PLACEMENT      5 stents    EYE SURGERY      MANDIBLE FRACTURE SURGERY      TONSILLECTOMY      TUBAL LIGATION      UTERINE FIBROID SURGERY         Current Outpatient Prescriptions:     acetaminophen (TYLENOL) 500 mg tablet, Take 500 mg by mouth every 6 (six) hours as needed for mild pain, Disp: , Rfl:     albuterol (2 5 mg/3 mL) 0 083 % nebulizer solution, Take 2 5 mg by nebulization every 6 (six) hours as needed for wheezing, Disp: , Rfl:     albuterol (PROVENTIL HFA,VENTOLIN HFA) 90 mcg/act inhaler, Inhale 2 puffs every 6 (six) hours as needed for wheezing, Disp: , Rfl:     allopurinol (ZYLOPRIM) 100 mg tablet, Take 100 mg by mouth daily, Disp: , Rfl:     aspirin (ECOTRIN LOW STRENGTH) 81 mg EC tablet, Take 81 mg by mouth daily, Disp: , Rfl:     atorvastatin (LIPITOR) 10 mg tablet, Take 10 mg by mouth daily, Disp: , Rfl:     buPROPion (WELLBUTRIN SR) 150 mg 12 hr tablet, Take 150 mg by mouth daily, Disp: , Rfl:     carBAMazepine (TEGretol) 100 mg chewable tablet, Chew 100 mg 3 (three) times a day, Disp: , Rfl:     Cholecalciferol (VITAMIN D) 2000 units CAPS, Take 1 capsule by mouth daily  , Disp: , Rfl:     diltiazem (CARDIZEM SR) 90 mg 12 hr capsule, Take 2 capsules (180 mg total) by mouth every 12 (twelve) hours, Disp: 30 capsule, Rfl: 0    divalproex sodium (DEPAKOTE ER) 250 mg 24 hr tablet, Take 1 tablet (250 mg total) by mouth daily, Disp: 30 tablet, Rfl: 0    fenofibrate micronized (LOFIBRA) 67 MG capsule, Take 67 mg by mouth every morning before breakfast, Disp: , Rfl:     ferrous sulfate 325 (65 Fe) mg tablet, Take 325 mg by mouth 2 (two) times a day, Disp: , Rfl:     fluticasone furoate-vilanterol (BREO ELLIPTA), Inhale 1 puff daily, Disp: , Rfl:     furosemide (LASIX) 20 mg tablet, Take 3 tablets (60 mg total) by mouth daily, Disp: 30 tablet, Rfl: 0    Levothyroxine Sodium 88 MCG CAPS, Take 88 mcg by mouth every morning  , Disp: , Rfl:     metoprolol tartrate (LOPRESSOR) 50 mg tablet, Take 1 tablet (50 mg total) by mouth every 12 (twelve) hours, Disp: 60 tablet, Rfl: 0    nitroglycerin (NITROSTAT) 0 4 mg SL tablet, Place 1 tablet (0 4 mg total) under the tongue every 5 (five) minutes as needed for chest pain, Disp: 90 tablet, Rfl: 0    potassium chloride (K-DUR,KLOR-CON) 20 mEq tablet, Take 1 tablet (20 mEq total) by mouth daily, Disp: 15 tablet, Rfl: 0    Sennosides-Docusate Sodium (SENNA PLUS PO), Take by mouth daily as needed, Disp: , Rfl:     warfarin (COUMADIN) 5 mg tablet, One tablet 5 mg daily at dinner until changed by doctor, Disp: 30 tablet, Rfl: 0  Allergies   Allergen Reactions    Ramipril Anaphylaxis  Spiriva Handihaler [Tiotropium Bromide Monohydrate] Swelling    Penicillins      Vitals:    06/21/18 1410   BP: 126/74   Pulse: (!) 52   SpO2: 94%   Weight: 81 6 kg (180 lb)   Height: 5' 2" (1 575 m)       Labs:  Admission on 06/16/2018, Discharged on 06/19/2018   Component Date Value    Sodium 06/16/2018 141     Potassium 06/16/2018 3 6     Chloride 06/16/2018 104     CO2 06/16/2018 30     Anion Gap 06/16/2018 7     BUN 06/16/2018 30*    Creatinine 06/16/2018 1 74*    Glucose 06/16/2018 174*    Calcium 06/16/2018 9 4     AST 06/16/2018 28     ALT 06/16/2018 44     Alkaline Phosphatase 06/16/2018 68     Total Protein 06/16/2018 7 9     Albumin 06/16/2018 3 6     Total Bilirubin 06/16/2018 0 30     eGFR 06/16/2018 29     WBC 06/16/2018 5 94     RBC 06/16/2018 3 53*    Hemoglobin 06/16/2018 12 0     Hematocrit 06/16/2018 36 4     MCV 06/16/2018 103*    MCH 06/16/2018 34 0     MCHC 06/16/2018 33 0     RDW 06/16/2018 14 1     MPV 06/16/2018 11 4     Platelets 05/89/0027 148*    nRBC 06/16/2018 0     Neutrophils Relative 06/16/2018 51     Immat GRANS % 06/16/2018 1     Lymphocytes Relative 06/16/2018 37     Monocytes Relative 06/16/2018 9     Eosinophils Relative 06/16/2018 1     Basophils Relative 06/16/2018 1     Neutrophils Absolute 06/16/2018 3 04     Immature Grans Absolute 06/16/2018 0 07     Lymphocytes Absolute 06/16/2018 2 17     Monocytes Absolute 06/16/2018 0 56     Eosinophils Absolute 06/16/2018 0 06     Basophils Absolute 06/16/2018 0 04     Troponin I 06/16/2018 <0 02     Digoxin Lvl 06/16/2018 1 1     Protime 06/17/2018 19 0*    INR 06/17/2018 1 62*    Sodium 06/17/2018 143     Potassium 06/17/2018 3 8     Chloride 06/17/2018 106     CO2 06/17/2018 32     Anion Gap 06/17/2018 5     BUN 06/17/2018 27*    Creatinine 06/17/2018 1 48*    Glucose 06/17/2018 173*    Calcium 06/17/2018 9 4     eGFR 06/17/2018 35     Ventricular Rate 06/16/2018 50     Atrial Rate 06/16/2018 50     NC Interval 06/16/2018 216     QRSD Interval 06/16/2018 100     QT Interval 06/16/2018 470     QTC Interval 06/16/2018 428     P Axis 06/16/2018 63     QRS Axis 06/16/2018 4     T Wave Axis 06/16/2018 49     Ventricular Rate 06/16/2018 51     Atrial Rate 06/16/2018 51     NC Interval 06/16/2018 220     QRSD Interval 06/16/2018 94     QT Interval 06/16/2018 448     QTC Interval 06/16/2018 412     P Axis 06/16/2018 56     QRS Axis 06/16/2018 8     T Wave Axis 06/16/2018 31     Protime 06/18/2018 19 9*    INR 06/18/2018 1 71*    Sodium 06/18/2018 143     Potassium 06/18/2018 3 9     Chloride 06/18/2018 106     CO2 06/18/2018 32     Anion Gap 06/18/2018 5     BUN 06/18/2018 29*    Creatinine 06/18/2018 1 44*    Glucose 06/18/2018 124     Calcium 06/18/2018 9 2     eGFR 06/18/2018 37     Protime 06/19/2018 28 6*    INR 06/19/2018 2 74*    NT-proBNP 06/19/2018 801*   Admission on 05/07/2018, Discharged on 05/12/2018   Component Date Value    WBC 05/07/2018 5 63     RBC 05/07/2018 3 36*    Hemoglobin 05/07/2018 11 3*    Hematocrit 05/07/2018 35 2     MCV 05/07/2018 105*    MCH 05/07/2018 33 6     MCHC 05/07/2018 32 1     RDW 05/07/2018 12 9     MPV 05/07/2018 11 9     Platelets 95/21/9981 163     nRBC 05/07/2018 0     Neutrophils Relative 05/07/2018 65     Lymphocytes Relative 05/07/2018 18     Monocytes Relative 05/07/2018 10     Eosinophils Relative 05/07/2018 6     Basophils Relative 05/07/2018 1     Neutrophils Absolute 05/07/2018 3 65     Lymphocytes Absolute 05/07/2018 1 03     Monocytes Absolute 05/07/2018 0 56     Eosinophils Absolute 05/07/2018 0 33     Basophils Absolute 05/07/2018 0 03     Sodium 05/07/2018 146*    Potassium 05/07/2018 3 6     Chloride 05/07/2018 108     CO2 05/07/2018 32     Anion Gap 05/07/2018 6     BUN 05/07/2018 32*    Creatinine 05/07/2018 1 95*    Glucose 05/07/2018 187*    Calcium 05/07/2018 9 2  AST 05/07/2018 25     ALT 05/07/2018 34     Alkaline Phosphatase 05/07/2018 51     Total Protein 05/07/2018 7 1     Albumin 05/07/2018 3 2*    Total Bilirubin 05/07/2018 0 50     eGFR 05/07/2018 25     Troponin I 05/07/2018 <0 02     Color, UA 05/07/2018 Yellow     Clarity, UA 05/07/2018 Clear     Specific Gravity, UA 05/07/2018 1 010     pH, UA 05/07/2018 7 0     Leukocytes, UA 05/07/2018 Trace*    Nitrite, UA 05/07/2018 Positive*    Protein, UA 05/07/2018 Negative     Glucose, UA 05/07/2018 Negative     Ketones, UA 05/07/2018 Negative     Urobilinogen, UA 05/07/2018 0 2     Bilirubin, UA 05/07/2018 Negative     Blood, UA 05/07/2018 Small*    D-Dimer, Quant 05/07/2018 477*    Protime 05/07/2018 21 4*    INR 05/07/2018 1 80*    PTT 05/07/2018 37*    NT-proBNP 05/07/2018 3327*    RBC, UA 05/07/2018 4-10*    WBC, UA 05/07/2018 1-2*    Epithelial Cells 05/07/2018 Occasional     Bacteria, UA 05/07/2018 None Seen     pH, Arterial 05/07/2018 7 374     pCO2, Arterial 05/07/2018 39 9     pO2, Arterial 05/07/2018 59 3*    HCO3, Arterial 05/07/2018 22 8     Base Excess, Arterial 05/07/2018 -2 2     O2 Content, Arterial 05/07/2018 17 1     O2 HGB,Arterial  05/07/2018 86 7*    SOURCE 05/07/2018 Radial, Left     COREY TEST 05/07/2018 Yes     Ventricular Rate 05/07/2018 122     Atrial Rate 05/07/2018 122     NM Interval 05/07/2018 172     QRSD Interval 05/07/2018 102     QT Interval 05/07/2018 302     QTC Interval 05/07/2018 430     P Axis 05/07/2018 74     QRS Axis 05/07/2018 10     T Wave [place] 05/07/2018 113     Ventricular Rate 05/07/2018 101     Atrial Rate 05/07/2018 256     QRSD Interval 05/07/2018 96     QT Interval 05/07/2018 252     QTC Interval 05/07/2018 326     QRS Axis 05/07/2018 11     T Wave Axis 05/07/2018 199     Sodium 05/08/2018 144     Potassium 05/08/2018 4 2     Chloride 05/08/2018 107     CO2 05/08/2018 29     Anion Gap 05/08/2018 8     BUN 05/08/2018 34*    Creatinine 05/08/2018 1 86*    Glucose 05/08/2018 111     Calcium 05/08/2018 9 3     eGFR 05/08/2018 27     WBC 05/08/2018 6 49     RBC 05/08/2018 3 42*    Hemoglobin 05/08/2018 11 4*    Hematocrit 05/08/2018 35 4     MCV 05/08/2018 104*    MCH 05/08/2018 33 3     MCHC 05/08/2018 32 2     RDW 05/08/2018 13 1     Platelets 44/05/4322 153     MPV 05/08/2018 11 8     Protime 05/08/2018 21 5*    INR 05/08/2018 1 82*    Protime 05/09/2018 23 9*    INR 05/09/2018 2 07*    WBC 05/09/2018 5 48     RBC 05/09/2018 3 42*    Hemoglobin 05/09/2018 11 3*    Hematocrit 05/09/2018 35 1     MCV 05/09/2018 103*    MCH 05/09/2018 33 0     MCHC 05/09/2018 32 2     RDW 05/09/2018 12 7     Platelets 16/07/7461 150     MPV 05/09/2018 11 2     Sodium 05/09/2018 142     Potassium 05/09/2018 3 7     Chloride 05/09/2018 105     CO2 05/09/2018 28     Anion Gap 05/09/2018 9     BUN 05/09/2018 30*    Creatinine 05/09/2018 1 61*    Glucose 05/09/2018 129     Calcium 05/09/2018 9 0     eGFR 05/09/2018 32     Protime 05/10/2018 24 4*    INR 05/10/2018 2 13*    Protime 05/11/2018 21 8*    INR 05/11/2018 1 84*    Sodium 05/11/2018 142     Potassium 05/11/2018 3 6     Chloride 05/11/2018 104     CO2 05/11/2018 31     Anion Gap 05/11/2018 7     BUN 05/11/2018 24     Creatinine 05/11/2018 1 51*    Glucose 05/11/2018 126     Calcium 05/11/2018 9 5     eGFR 05/11/2018 35     Protime 05/12/2018 27 2*    INR 05/12/2018 2 45*     Imaging: X-ray Chest 2 Views    Result Date: 6/17/2018  Narrative: CHEST INDICATION:   chest pain  69-year-old woman presenting with chest pain  COMPARISON:  Chest radiograph 5/7/2018 EXAM PERFORMED/VIEWS:  XR CHEST PA & LATERAL  The frontal view was performed utilizing dual energy radiographic technique  FINDINGS: Moderate cardiomegaly is unchanged  Pulmonary edema has resolved though there is still mild pulmonary vascular congestion  No focal consolidation  Louisa Cutting No pneumothorax or large pleural effusion  Osseous structures appear within normal limits for patient age  Impression: 1  Resolution of interstitial pulmonary edema since 5/7/2018  At least mild pulmonary vascular congestion remains  2   Unchanged moderate cardiomegaly  Workstation performed: [id number]     Vas Abdominal Aorta/iliacs; Complete Study    Result Date: 6/5/2018  Narrative:  [facility] REPORT CLINICAL: Indications: Aortic Occlusive disease [I70 0]  Claudication [I70 219]  Evaluate for progression of Aorto-Iliac occlusive disease  Patient is asymptomatic at this time  Operative History: Cardiac Stents Risk Factors The patient has history of obesity, hypertension, PAD and CAD  She has no history of diabetes  Clinical Right Pressure:  127/ mm Hg, Left Pressure:  122/ mm Hg  FINDINGS:  Unilateral       PSV (cm/s)  EDV (cm/s)  AP (cm)  Sup-Fabiana Ao              104          25      2 0  Jux Renal Aorta                              1 6  Px Inf-Get Ao            90          56      1 2  Ds Inf-Get Ao           154          39           Celiac                  193          25           Prox  SMA               134          20            Segment    Right                               Left                  Impression  PSV (cm/s)  EDV (cm/s)  PSV (cm/s)  EDV (cm/s)  Prox TEMO   50-75%             304          48         184          31  Dist TEMO                                              196          41  Prox  EIA                     110                     219          54  Dist EIA   >75%               493          69         182          34     CONCLUSION:  Impression The aorta is patent and normal in caliber measuring 2 0cm in its greatest diameter  There is a 50-75% stenosis of the right proximal common iliac artery, and a >75% stenosis of the distal external iliac artery  The left common and external iliac arteries appear to be patent  The celiac and superior mesenteric arteries are patent  Ankle/Brachial indices are: Rt - 0 54 and Lt - 0 74 Metatarsal pressures are 61mmHg on the right and 77mmHg on the left  Great toe pressures are 46mmHg on the right and 62mmHg on the left  Tech note: This exam was technically limited due to over lying bowel gas  Recommend repeat testing in 6months as per protocol unless otherwise indicated  SIGNATURE: Electronically Signed by: Thad Sever, MD on 2018-06-05 05:37:47 PM    Vas Lower Limb Arterial Duplex, Complete Bilateral    Result Date: 6/5/2018  Narrative:  [facility] REPORT CLINICAL: Indications: Atherosclerosis of native arteries of extremities with intermittent claudication, bilateral legs [I70 213]  Operative History Cardiac Stents Risk Factors: The patient has history of obesity, HTN, PAD and CAD  She has no history of diabetes  Clinical Right Pressure:  127/ mm Hg, Left Pressure:  122/ mm Hg  FINDINGS:  Right                  Impression       Waveform    PSV  EDV  Post  Tibial                            Monophasic            Ant  Tibial                             Monophasic            Common Femoral Artery  Diffuse Disease              195   25  Prox Profunda                                       128   15  Prox SFA                                            122   16  Mid SFA                                              96   16  Dist SFA                                             84   15  Proximal Pop                                        102       Distal Pop                                           43    9  Tibioperoneal                                        77       Dist Post Tibial                                     41   12  Dist  Ant  Tibial                                    30    8   Left                   Impression  Waveform    PSV  EDV  Post  Tibial                       Monophasic            Ant   Tibial                        Monophasic            Common Femoral Artery                          195   15  Prox Profunda 81    0  Prox SFA               <50%                    241   38  Mid SFA                                        140   17  Dist SFA               50-75%                  264   38  Proximal Pop                                    79   12  Distal Pop                                      75   10  Tibioperoneal                                  115       Dist Post Tibial                                72   11  Dist  Ant  Tibial                               93    0     CONCLUSION: Impression: Findings suggestive of aortoiliac disease  RIGHT LOWER LIMB: Moderate diffused disease throughout the femoral and popliteal arteries  Ankle/Brachial index: 0 54 PVR/ PPG tracings are dampened  Metatarsal pressure of 61mmHg Great toe pressure of 46mmHg, within the healing range LEFT LOWER LIMB: Moderate diffused disease throughout the femoral and popliteal arteries, with a <50% stenosis in the proximal superficial femoral artery, and a 50-75% stenosis in the distal superficial femoral artery  Ankle/Brachial index:  0 74 PVR/ PPG tracings are dampened  Metatarsal pressure of 72mmHg Great toe pressure of 62mmHg, within the healing range  Recommend repeat testing in 6 months as per protocol unless otherwise indicated  SIGNATURE: Electronically Signed by: Gera Holcomb MD on 2018-06-05 05:38:25 PM      Review of Systems:  Review of Systems   Constitutional: Negative for diaphoresis, fatigue and fever  HENT: Negative for congestion, ear discharge, hearing loss, sinus pain and sore throat  Eyes: Negative for pain, redness and visual disturbance  Respiratory: Positive for shortness of breath  Negative for cough, chest tightness and wheezing  Cardiovascular: Positive for palpitations  Negative for chest pain and leg swelling  Gastrointestinal: Negative for abdominal distention, abdominal pain, constipation, diarrhea, nausea and vomiting  Endocrine: Negative for cold intolerance and heat intolerance  Genitourinary: Negative for difficulty urinating and hematuria  Musculoskeletal: Negative for arthralgias, back pain, gait problem, joint swelling, myalgias, neck pain and neck stiffness  Skin: Negative for color change, pallor, rash and wound  Neurological: Negative for dizziness, syncope, weakness, numbness and headaches  Hematological: Does not bruise/bleed easily  Psychiatric/Behavioral: Negative for agitation, behavioral problems and confusion  The patient is not nervous/anxious  /74   Pulse (!) 52   Ht 5' 2" (1 575 m)   Wt 81 6 kg (180 lb)   SpO2 94%   BMI 32 92 kg/m²     Physical Exam:  Physical Exam   Constitutional: She is oriented to person, place, and time  She appears well-developed and well-nourished  HENT:   Head: Normocephalic and atraumatic  Eyes: Conjunctivae and EOM are normal  Pupils are equal, round, and reactive to light  Neck: Normal range of motion  Neck supple  JVD present  Cardiovascular: Normal rate, regular rhythm, normal heart sounds and intact distal pulses  Exam reveals no gallop and no friction rub  No murmur heard  Pulmonary/Chest: Effort normal and breath sounds normal  No respiratory distress  She has no wheezes  She has no rales  She exhibits no tenderness  +on chronic O2   Abdominal: Soft  Bowel sounds are normal  She exhibits no distension  There is no rebound  Musculoskeletal: Normal range of motion  She exhibits edema  Neurological: She is alert and oriented to person, place, and time  She has normal reflexes  Skin: Skin is warm and dry  Psychiatric: She has a normal mood and affect   Her behavior is normal  Judgment and thought content normal        Discussion/Summary:  1  PAD, Bilateral lower extremity claudication:  -Still has moderate leg pain with walking  -VAS lower limb arterial duplex: Right lower limb-moderate diffuse disease throughout femoral and popliteal arteries, L lower limb- moderate diffuse disease throughout femoral and popliteal arteries with <50% stenosis of proximal superficial femoral artery, 50-75% stenosis in distal superficial femoral artery  -VAS abdominal aorta/iliacs: 50-75% of R proximal common iliac artery, >75% stenosis of distal external iliac artery  -May consider leg angiogram during next visit given recent hospitalization for CHF exacerbation, last Cr 1 44  Will order BMP  -Continue ASA, statin    2  Chronic diastolic CHF:  -Not in exacerbation  Chronically has leg swelling  -ECHO Dec 2017 shows EF 55-65%, no regional wall motion abnormalities, G1DD  -Continue lasix, lopressor  Will increase lasix to 40 mg BID  -Low salt diet, daily weights    3  CAD s/p PCI: No anginal symptoms, continue ASA, statin, BB     4  A Fib/flutter: Continue metoprolol for rate control, coumadin for anticoagulation    5  HTN: Stable, continue present regimen    6   HLD: Continue statin    F/u 2 months

## 2018-06-22 DIAGNOSIS — I50.32 CHRONIC DIASTOLIC CONGESTIVE HEART FAILURE (HCC): ICD-10-CM

## 2018-06-22 RX ORDER — FUROSEMIDE 40 MG/1
40 TABLET ORAL 2 TIMES DAILY
Qty: 180 TABLET | Refills: 3 | Status: SHIPPED | OUTPATIENT
Start: 2018-06-22 | End: 2018-10-12 | Stop reason: SDUPTHER

## 2018-06-22 NOTE — TELEPHONE ENCOUNTER
PT NEEDS REFILL ON FUROSEMIDE 40MG SENT TO [facility] IN [place] [address]

## 2018-06-29 ENCOUNTER — OFFICE VISIT (OUTPATIENT)
Dept: NEUROLOGY | Facility: CLINIC | Age: 73
End: 2018-06-29
Payer: MEDICARE

## 2018-06-29 ENCOUNTER — APPOINTMENT (OUTPATIENT)
Dept: LAB | Facility: CLINIC | Age: 73
End: 2018-06-29
Payer: MEDICARE

## 2018-06-29 VITALS
DIASTOLIC BLOOD PRESSURE: 64 MMHG | HEIGHT: 60 IN | WEIGHT: 178 LBS | SYSTOLIC BLOOD PRESSURE: 120 MMHG | BODY MASS INDEX: 34.95 KG/M2 | HEART RATE: 56 BPM

## 2018-06-29 DIAGNOSIS — G40.909 SEIZURE DISORDER (HCC): ICD-10-CM

## 2018-06-29 DIAGNOSIS — F02.81 DEMENTIA ASSOCIATED WITH OTHER UNDERLYING DISEASE WITH BEHAVIORAL DISTURBANCE (HCC): Primary | ICD-10-CM

## 2018-06-29 DIAGNOSIS — F02.81 DEMENTIA ASSOCIATED WITH OTHER UNDERLYING DISEASE WITH BEHAVIORAL DISTURBANCE (HCC): ICD-10-CM

## 2018-06-29 LAB
ANION GAP SERPL CALCULATED.3IONS-SCNC: 3 MMOL/L (ref 4–13)
BUN SERPL-MCNC: 35 MG/DL (ref 5–25)
CALCIUM SERPL-MCNC: 9.9 MG/DL (ref 8.3–10.1)
CARBAMAZEPINE SERPL-MCNC: 8.6 UG/ML (ref 4–12)
CHLORIDE SERPL-SCNC: 106 MMOL/L (ref 100–108)
CO2 SERPL-SCNC: 33 MMOL/L (ref 21–32)
CREAT SERPL-MCNC: 1.7 MG/DL (ref 0.6–1.3)
FOLATE SERPL-MCNC: 18.9 NG/ML (ref 3.1–17.5)
GFR SERPL CREATININE-BSD FRML MDRD: 30 ML/MIN/1.73SQ M
GLUCOSE P FAST SERPL-MCNC: 115 MG/DL (ref 65–99)
POTASSIUM SERPL-SCNC: 4.3 MMOL/L (ref 3.5–5.3)
SODIUM SERPL-SCNC: 142 MMOL/L (ref 136–145)
TSH SERPL DL<=0.05 MIU/L-ACNC: 2.58 UIU/ML (ref 0.36–3.74)
VIT B12 SERPL-MCNC: 272 PG/ML (ref 100–900)

## 2018-06-29 PROCEDURE — 84443 ASSAY THYROID STIM HORMONE: CPT

## 2018-06-29 PROCEDURE — 99214 OFFICE O/P EST MOD 30 MIN: CPT | Performed by: PSYCHIATRY & NEUROLOGY

## 2018-06-29 PROCEDURE — 82607 VITAMIN B-12: CPT

## 2018-06-29 PROCEDURE — 80048 BASIC METABOLIC PNL TOTAL CA: CPT | Performed by: INTERNAL MEDICINE

## 2018-06-29 PROCEDURE — 36415 COLL VENOUS BLD VENIPUNCTURE: CPT | Performed by: INTERNAL MEDICINE

## 2018-06-29 PROCEDURE — 82746 ASSAY OF FOLIC ACID SERUM: CPT

## 2018-06-29 PROCEDURE — 86592 SYPHILIS TEST NON-TREP QUAL: CPT

## 2018-06-29 PROCEDURE — 80156 ASSAY CARBAMAZEPINE TOTAL: CPT

## 2018-06-29 RX ORDER — CITRIC ACID, GLUCONOLACTONE AND MAGNESIUM CARBONATE 6.602; .198; 3.268 G/100ML; G/100ML; G/100ML
SOLUTION IRRIGATION
COMMUNITY
End: 2021-06-25 | Stop reason: HOSPADM

## 2018-06-29 NOTE — PROGRESS NOTES
Kylah Bullard is a 71 y o  female  Chief Complaint   Patient presents with    Memory Loss       Assessment:  1  Dementia associated with other underlying disease with behavioral disturbance    2  Seizure disorder [facility])        Plan:    Discussion:  Differential diagnosis discussed with the patient and her daughter, patient has cognitive impairment which could be multifactorial given her history of brain aneurysm rupture, would recommend an MRI scan of the brain if it can be done and if not then we will do a CT scan of the brain, also would recommend EEG and blood work and neuropsych testing  patient to call me after the above test to discuss the results, we discussed different medication options including Aricept and Exelon patch, patient's daughter would like to discuss with family physician prior to taking the medication secondary to the possibilities of side effects like slightly decreasing seizure threshold, patient was advised to be under constant supervision, she was advised to take seizure precautions, to go to the hospital if has any worsening symptoms and call me otherwise to see me back in 2 months and follow up with her other physicians      Subjective:    HPI     Patient is here for evaluation of memory difficulty for the last several years, according to the daughter patient had a history of a brain aneurysm rupture many years ago and since then she has had issues with her memory, she would have mostly short-term memory issues and also she would get agitated at times, she has had history of seizure last seizure being about 15 years back, she is on Tegretol which controls the seizures, she has a urinary catheter and she would get more confused and agitated when she has a UTI, her appetite is decent, she has recently would on continuous oxygen, also sleep is reasonably okay, weight has been stable, denying any headaches, no dizziness, no motor or sensory symptoms in upper or lower extremity, she does repeat things as per the family, sleep is okay, no other complaints    Vitals:    06/29/18 0953   BP: 120/64   BP Location: Left arm   Patient Position: Sitting   Cuff Size: Adult   Pulse: 56   Weight: 80 7 kg (178 lb)   Height: 5' 0 25" (1 53 m)       Current Medications    Current Outpatient Prescriptions:     acetaminophen (TYLENOL) 500 mg tablet, Take 500 mg by mouth every 6 (six) hours as needed for mild pain, Disp: , Rfl:     albuterol (2 5 mg/3 mL) 0 083 % nebulizer solution, Take 2 5 mg by nebulization every 6 (six) hours as needed for wheezing, Disp: , Rfl:     albuterol (PROVENTIL HFA,VENTOLIN HFA) 90 mcg/act inhaler, Inhale 2 puffs every 6 (six) hours as needed for wheezing, Disp: , Rfl:     allopurinol (ZYLOPRIM) 100 mg tablet, Take 400 mg by mouth every morning  , Disp: , Rfl:     aspirin (ECOTRIN LOW STRENGTH) 81 mg EC tablet, Take 81 mg by mouth daily, Disp: , Rfl:     atorvastatin (LIPITOR) 10 mg tablet, Take 10 mg by mouth daily, Disp: , Rfl:     buPROPion (WELLBUTRIN SR) 150 mg 12 hr tablet, Take 150 mg by mouth daily, Disp: , Rfl:     carBAMazepine (TEGretol) 100 mg chewable tablet, Chew 100 mg 3 (three) times a day, Disp: , Rfl:     Cholecalciferol (VITAMIN D) 2000 units CAPS, Take 1 capsule by mouth daily  , Disp: , Rfl:     Citric Rf-Welgepkdmpo-Yv Carb (RENACIDIN) SOLN, Indications: 60ml to irrigate bcaa once a week, Disp: , Rfl:     diltiazem (CARDIZEM SR) 90 mg 12 hr capsule, Take 2 capsules (180 mg total) by mouth every 12 (twelve) hours, Disp: 30 capsule, Rfl: 0    divalproex sodium (DEPAKOTE ER) 250 mg 24 hr tablet, Take 1 tablet (250 mg total) by mouth daily, Disp: 30 tablet, Rfl: 0    fenofibrate micronized (LOFIBRA) 67 MG capsule, Take 67 mg by mouth every morning before breakfast, Disp: , Rfl:     ferrous sulfate 325 (65 Fe) mg tablet, Take 325 mg by mouth 2 (two) times a day, Disp: , Rfl:     fluticasone furoate-vilanterol (BREO ELLIPTA), Inhale 1 puff daily, Disp: , Rfl:     furosemide (LASIX) 40 mg tablet, Take 1 tablet (40 mg total) by mouth 2 (two) times a day, Disp: 180 tablet, Rfl: 3    Levothyroxine Sodium 88 MCG CAPS, Take 88 mcg by mouth every morning  , Disp: , Rfl:     metoprolol tartrate (LOPRESSOR) 50 mg tablet, Take 1 tablet (50 mg total) by mouth every 12 (twelve) hours, Disp: 60 tablet, Rfl: 0    nitroglycerin (NITROSTAT) 0 4 mg SL tablet, Place 1 tablet (0 4 mg total) under the tongue every 5 (five) minutes as needed for chest pain, Disp: 90 tablet, Rfl: 0    potassium chloride (K-DUR,KLOR-CON) 20 mEq tablet, Take 1 tablet (20 mEq total) by mouth daily, Disp: 15 tablet, Rfl: 0    Sennosides-Docusate Sodium (SENNA PLUS PO), Take by mouth daily as needed, Disp: , Rfl:     warfarin (COUMADIN) 5 mg tablet, One tablet 5 mg daily at dinner until changed by doctor, Disp: 30 tablet, Rfl: 0      Allergies  Ramipril; Spiriva handihaler [tiotropium bromide monohydrate]; and Penicillins    Past Medical History  Past Medical History:   Diagnosis Date    [facility]     Brain aneurysm     CAD (coronary artery disease)     Cardiac disease     had stents 12 years ago in [facility]    CHF (congestive heart failure) (HCC)     CKD (chronic kidney disease)     [facility]     Dementia     Dementia     Disease of thyroid gland     Hyperlipidemia     Hyperlipidemia     Hypertension     MI [facility] infarction) [facility]     Migraine     Renal disorder     Seizures ([facility]     Stroke ([facility] )          Past Surgical History:  Past Surgical History:   Procedure Laterality Date    APPENDECTOMY      BLADDER TUMOR EXCISION      BRAIN SURGERY      CARDIAC SURGERY      CORONARY STENT PLACEMENT      5 stents    EYE SURGERY      MANDIBLE FRACTURE SURGERY      TONSILLECTOMY      TUBAL LIGATION      UTERINE FIBROID SURGERY           Family History:  Family History   Problem Relation Age of Onset    Heart disease Father     Parkinsonism Father     Macular degeneration Mother     Glaucoma Mother     Diabetes Brother     Heart disease Brother        Social History:   reports that she has quit smoking  She has never used smokeless tobacco  She reports that she does not drink alcohol or use drugs  I have reviewed the past medical history, surgical history, social and family history, current medications, allergies vitals, review of systems, and updated this information as appropriate today  Objective:    Physical Exam    Neurological Exam  Patient is alert awake oriented, [place] is 19/30 speech is fluent  No evidence of any aphasia or dysarthria  Cranial nerve examination reveals visual fields are full to threat, pupils equal and reactive, extraocular movements intact, fundi showed sharp disc margins, sensation in the V1 V2 V3 distribution is symmetric, no obvious facial asymmetry noted, tongue is midline and gag is adequate  Hearing is intact bilaterally, shoulder shrug is intact bilaterally  Motor examination reveals normal tone and bulk, no evidence of any drift to the outstretched extremities, strength is 5/5 preserved bilaterally in both upper and lower extremities, deep tendon reflexes are intact, toes are downgoing  Sensory examination to pinprick light touch proprioception and vibration is preserved bilaterally, patient does not extinguish double simultaneous stimuli  Coordination no evidence of any finger-to-nose dysmetria  Gait is normal based  ROS:  Review of Systems   Constitutional: Positive for appetite change and fatigue  Negative for fever  HENT: Positive for trouble swallowing  Negative for hearing loss, tinnitus and voice change  Eyes: Positive for photophobia  Negative for pain and visual disturbance  Respiratory: Positive for shortness of breath  Cardiovascular: Negative  Negative for chest pain and palpitations  Gastrointestinal: Negative    Negative for abdominal pain, nausea and vomiting  Endocrine: Negative  Negative for cold intolerance and heat intolerance  Genitourinary: Negative  Negative for dysuria, frequency and urgency  Musculoskeletal: Positive for back pain  Negative for myalgias and neck pain  Skin: Negative  Negative for rash  Neurological: Negative for seizures, syncope, facial asymmetry, speech difficulty, weakness, light-headedness, numbness and headaches  Hematological: Negative  Does not bruise/bleed easily  Psychiatric/Behavioral: Positive for agitation and confusion  Negative for hallucinations and sleep disturbance

## 2018-07-02 LAB — RPR SER QL: NORMAL

## 2018-07-05 ENCOUNTER — HOSPITAL ENCOUNTER (OUTPATIENT)
Dept: SLEEP CENTER | Facility: CLINIC | Age: 73
Discharge: HOME | End: 2018-07-05
Payer: MEDICARE

## 2018-07-05 DIAGNOSIS — G47.33 OSA (OBSTRUCTIVE SLEEP APNEA): ICD-10-CM

## 2018-07-05 PROCEDURE — 95810 POLYSOM 6/> YRS 4/> PARAM: CPT | Performed by: INTERNAL MEDICINE

## 2018-07-05 PROCEDURE — 95810 POLYSOM 6/> YRS 4/> PARAM: CPT

## 2018-07-06 NOTE — PROGRESS NOTES
Sleep Study Documentation    Pre-Sleep Study       Sleep testing procedure explained to patient:YES    Patient napped prior to study:NO    Caffeine:Dayshift worker after 12PM   Caffeine use:YES- ice tea  12 ounces and chocolate milk  8 to 18 ounces    Alcohol:Dayshift workers after 5PM: Alcohol use:NO    Typical day for patient:YES       Study Documentation  Diagnostic   Snore: Moderate  Supplemental O2: yes  O2 flow rate (L/min) range 1  O2 flow rate (L/min) final 1  O2 flow rate (L/min) range 1lpm  O2 flow rate (L/min) final 1lpm  Minimum SaO2 75%  Baseline SaO2 93%        Mode of Therapy: n/a    EKG abnormalities: no     EEG abnormalities: no    Study Terminated:no    Patient classification: disabled       Post-Sleep Study    Medication used at bedtime or during sleep study:YES other prescription medications    Patient reports time it took to fall asleep:less than 20 minutes    Patient reports waking up during study:Denied    Patient reports sleeping 6 to 8 hours without dreaming  Patient reports sleep during study:better than usual    Patient rated sleepiness: Not sleepy or tired    PAP treatment:no

## 2018-07-09 ENCOUNTER — TELEPHONE (OUTPATIENT)
Dept: PULMONOLOGY | Facility: CLINIC | Age: 73
End: 2018-07-09

## 2018-07-09 NOTE — TELEPHONE ENCOUNTER
----- Message from Lesly Caballero PA-C sent at 7/9/2018  3:18 PM EDT -----  Please let patient and her daughter know that she does have moderate sleep apnea  She should make a follow up appt as we have not seen her since her hospital discharge

## 2018-07-13 ENCOUNTER — TELEPHONE (OUTPATIENT)
Dept: CARDIOLOGY CLINIC | Facility: CLINIC | Age: 73
End: 2018-07-13

## 2018-07-16 ENCOUNTER — TELEPHONE (OUTPATIENT)
Dept: CARDIOLOGY CLINIC | Facility: CLINIC | Age: 73
End: 2018-07-16

## 2018-07-16 DIAGNOSIS — D48.1 ABDOMINAL FIBROMATOSIS: Primary | ICD-10-CM

## 2018-07-16 DIAGNOSIS — I48.91 ATRIAL FIBRILLATION, UNSPECIFIED TYPE (HCC): ICD-10-CM

## 2018-07-16 NOTE — TELEPHONE ENCOUNTER
Please tell the patient not to start the Eliquis now as the INR is very high  Repeat INR in 4 days and if the INR is less than 2 she can start Eliquis

## 2018-07-16 NOTE — TELEPHONE ENCOUNTER
Patients daughter & would like a call back regarding patients PT INR RESULTS   Danielle Mcqueen [phone]

## 2018-07-16 NOTE — TELEPHONE ENCOUNTER
We discontinued coumadin  We sent prescription for eliquis to her pharmacy  Please let patient know

## 2018-07-16 NOTE — TELEPHONE ENCOUNTER
Spoke with patient's daughter and she told me that patient's INR was 6 7 and patient was told by Dr Juan Xiong to hold the coumadin  Patient's daughter wants patient to be taken off coumadin and maybe put on something else due to patient having dementia  Please advise what else patient can take for atrial fibrillation  Patient's daughter also states patient is in stage 3 kidney failure and that is another reason she doesn't want her to be on coumadin

## 2018-07-17 NOTE — TELEPHONE ENCOUNTER
LMO patient's daughter's phone to stop coumadin and to have patient do INR on Friday, if under 2, then start the Eliquis 2 5 mg bid  Also stated for her to call me back to make sure she got this message

## 2018-07-17 NOTE — TELEPHONE ENCOUNTER
Spoke with daughter who states she got the message  Will send script for Eliquis to [address] [place] [address]  Daughter stated she will get a protime done on Friday

## 2018-07-17 NOTE — TELEPHONE ENCOUNTER
Spoke with Debbie Leone, RN of [facility] as requested by patient's daughter telling her that Dr Marielena Carroll wants to start patient on Eliquis 5 mg bid only when INR is under 2  Patient's daughter is taking patient to get INR on Friday as per Dr Serafin Palacios and if the INR is under 2, Eliquis can then be started  Also stated that [facility] was [facility] medication order already

## 2018-07-19 ENCOUNTER — TELEPHONE (OUTPATIENT)
Dept: CARDIOLOGY CLINIC | Facility: CLINIC | Age: 73
End: 2018-07-19

## 2018-07-19 ENCOUNTER — TELEPHONE (OUTPATIENT)
Dept: NON INVASIVE DIAGNOSTICS | Facility: HOSPITAL | Age: 73
End: 2018-07-19

## 2018-07-19 LAB — INR PPP: 1.6 (ref 0.86–1.17)

## 2018-07-19 NOTE — TELEPHONE ENCOUNTER
Dr Thierry Qureshi called & would like to speak to Dr Stephan Ramirez regarding this pt  He wanted to me to relay the message that pt is on a psychiatric med that will interfere & lower the effectiveness of Xerelto or Eliquis which Dr Stephan Ramirez had wanted to put pt on  Dr Thierry Qureshi said that he was ok with pt staying with Coumadin but now the pts daughter has interfered and does not want pt taking Coumadin anymore  Dr Thierry Qureshi said that pt cannot be taken off of her psychiatric med so he said it needs to be one med or the other or none at all  Dr Thierry Qureshi would like a call back from Dr Stephan Ramirez when he has a chance       Dr Thierry Qureshi can be reached at- [phone]

## 2018-07-20 ENCOUNTER — ANTICOAG VISIT (OUTPATIENT)
Dept: CARDIOLOGY CLINIC | Facility: CLINIC | Age: 73
End: 2018-07-20

## 2018-07-20 DIAGNOSIS — I48.0 PAROXYSMAL ATRIAL FIBRILLATION (HCC): Primary | ICD-10-CM

## 2018-07-20 RX ORDER — WARFARIN SODIUM 3 MG/1
TABLET ORAL
Qty: 90 TABLET | Refills: 2 | Status: ON HOLD | OUTPATIENT
Start: 2018-07-20 | End: 2018-12-14

## 2018-07-20 NOTE — TELEPHONE ENCOUNTER
----- Message from Devaughn Ortega MD sent at 7/19/2018  3:48 PM EDT -----  Call her pharmacy and tell him to cancel the prescription for Eliquis    She should start Coumadin 3 mg every day and check INR tomorrow and in 1 week

## 2018-07-20 NOTE — PROGRESS NOTES
Spoke with patient's daughter Rina Estrella letting her know that patient will be taking 3 mg of coumadin a day and to recheck in one week

## 2018-07-26 ENCOUNTER — OFFICE VISIT (OUTPATIENT)
Dept: PULMONOLOGY | Facility: CLINIC | Age: 73
End: 2018-07-26
Payer: MEDICARE

## 2018-07-26 VITALS
DIASTOLIC BLOOD PRESSURE: 80 MMHG | OXYGEN SATURATION: 92 % | SYSTOLIC BLOOD PRESSURE: 120 MMHG | BODY MASS INDEX: 35.34 KG/M2 | WEIGHT: 180 LBS | HEIGHT: 60 IN | HEART RATE: 57 BPM

## 2018-07-26 DIAGNOSIS — R09.02 HYPOXIA: Primary | ICD-10-CM

## 2018-07-26 DIAGNOSIS — G47.33 OSA (OBSTRUCTIVE SLEEP APNEA): ICD-10-CM

## 2018-07-26 DIAGNOSIS — J44.9 CHRONIC OBSTRUCTIVE PULMONARY DISEASE, UNSPECIFIED COPD TYPE (HCC): Chronic | ICD-10-CM

## 2018-07-26 PROCEDURE — 99215 OFFICE O/P EST HI 40 MIN: CPT | Performed by: PHYSICIAN ASSISTANT

## 2018-07-26 PROCEDURE — 94618 PULMONARY STRESS TESTING: CPT | Performed by: PHYSICIAN ASSISTANT

## 2018-07-26 NOTE — PROGRESS NOTES
Assessment:    1  Hypoxia  POCT 6 minute walk   2  JESENIA (obstructive sleep apnea)  CPAP [employer]   3  Chronic obstructive pulmonary disease, unspecified COPD type ([address] )  Complete pulmonary function test       Plan: 71 y o  female with dementia, former smoker who quit 12 years ago with at least a 79 pack-year smoking history with significant PMH of AFib, CAD s/p PCI, CHF, CKD, COPD, stroke, HLD who presents for follow-up after hospitalization from 5/7/2018 to 5/12/2018 for acute hypoxic respiratory failure thought to be related to pulmonary edema and atrial flutter  1   Hypoxia - Possibly 2/2 underlying COPD, however no significant degree of emphysema on CT? Patient's daughter is complaining that the patient is getting aggressive with using her oxygen at the nursing home  She is requesting a PRN order during the day for the oxygen  Repeated 6 min walk test  On RA at rest, patient's SpO2 of 97%  On room air with exertion, patient's SpO2 dropped to 82% after walking about 75ft  Recommended that patient use her oxygen nocturnally at 2 L via NC and with exertion when walking longer distances > 50 ft    2   JESENIA -reviewed diagnostic sleep study with AHI of 16 1 consistent with moderate sleep apnea  Discussed setting patient up with [employer]  Patient is agreeable  Will send in order to [facility] as this is the company she uses for her oxygen  3   COPD -previously diagnosed  Will obtain complete PFTs with DLCO  Order given to patient  Continue to use Breo 1 puff once daily  Rinse mouth after use  Can consider adjusting inhalers after completion of breathing test   Continue to use rescue inhaler/nebulizer as needed  No acute exacerbations since she was last seen in the hospital several months ago  F/u in 3 months or sooner if needed  Subjective:     Patient ID: Aldo Vasques is a 71 y o  female      Chief Complaint: SOB    HPI  71 y o  female with dementia, former smoker who quit 12 years ago with at least a 79 pack-year smoking history with significant PMH of AFib, CAD s/p PCI, CHF, CKD, COPD, stroke, HLD who presents for follow-up after hospitalization from 5/7/2018 to 5/12/2018 for acute hypoxic respiratory failure thought to be related to pulmonary edema and atrial flutter  Patient was discharged home on oxygen at this time and told to continue this at night  Patient's daughter is present and states that the patient has been struggling with using the oxygen during the day  She has been fighting with the nursing staff at the nursing facility she lives in and is becoming aggressive when they tried to put the oxygen on her  Daughter reports most of the day her oxygen saturations run anywhere from 90-98%  She has no issues using the oxygen at night at 2 L via nasal cannula  Patient is not sure which inhaler she is using  On her medication list there is Breo 1 puff once daily and nebulizer/rescue inhaler as needed  Patient does not recall the last time that she used her rescue inhaler  Patient also presents for follow-up of recent sleep study completed at the beginning of this month  Of note, patient was also hospitalized for acute CHF exacerbation from 6/16/2018 to 6/19/2018 treated with IV lasix  Review of Systems  Review of Systems   Constitution: Negative for chills, decreased appetite, fever, malaise/fatigue and weight gain  HENT: Negative for congestion  Eyes: Negative for visual disturbance  Cardiovascular: Positive for dyspnea on exertion  Negative for leg swelling and orthopnea  Respiratory: Positive for cough, shortness of breath and sleep disturbances due to breathing  Negative for wheezing  Hematologic/Lymphatic: Negative for adenopathy  Skin: Negative for rash  Musculoskeletal: Negative for muscle weakness  Gastrointestinal: Negative for abdominal pain, diarrhea, nausea and vomiting  Neurological: Negative for excessive daytime sleepiness  Psychiatric/Behavioral: Negative for altered mental status and hallucinations  Aggressive behavior    Allergic/Immunologic: Negative for environmental allergies  Objective:  /80   Pulse 57   Ht 5' 0 25" (1 53 m)   Wt 81 6 kg (180 lb)   SpO2 92% Comment: with 2  lts of oxygen  BMI 34 86 kg/m²     Physical Exam   Constitutional: She is oriented to person, place, and time  She appears well-developed and well-nourished  HENT:   Head: Normocephalic and atraumatic  Neck: Normal range of motion  Cardiovascular: Normal rate and regular rhythm  No murmur heard  Pulmonary/Chest: Effort normal and breath sounds normal  No respiratory distress  She has no wheezes  She has no rales  She exhibits no tenderness  Abdominal: Soft  There is no tenderness  Musculoskeletal: Normal range of motion  She exhibits no edema or tenderness  Lymphadenopathy:     She has no cervical adenopathy  Neurological: She is alert and oriented to person, place, and time  Skin: Skin is warm and dry  Psychiatric: She has a normal mood and affect  Her behavior is normal    Nursing note and vitals reviewed  Lab/Image Review: Reviewed all pertinent labs/radiology results       Past Medical History:   Diagnosis Date    Atrial fibrillation ([place] [address] )     Brain aneurysm     CAD (coronary artery disease)     Cardiac disease     had stents 12 years ago in [address] [place] [address] CH[facility]congestive [facility]) [facility]     CKD (chronic kidney disease)     COPD (chronic obstructive pulmonary disease) ([facility]     Dementia     Dementia     Disease of thyroid gland     Hyperlipidemia     Hyperlipidemia     Hypertension     MI (myocardial infarction) ([place] [address] )     Migraine     Renal disorder     Seizures ([place] [address] )     Stroke ([place] [address] )        Social History   Substance Use Topics    Smoking status: Former Smoker    Smokeless tobacco: Never Used    Alcohol use No       Family History   Problem Relation Age of Onset    Heart disease Father     Parkinsonism Father     Macular degeneration Mother     Glaucoma Mother     Diabetes Brother     Heart disease Brother        Patient Active Problem List   Diagnosis    HTN (hypertension)    COPD (chronic obstructive pulmonary disease) ([place] [address] )    Acute on chronic diastolic congestive heart failure ([place] [address] )    Acute on chronic respiratory failure (HCC)    Atrial flutter (HCC)    Back pain    Dementia    HLD (hyperlipidemia)    UTI (urinary tract infection)    Seizure disorder ([place] [address] )    RSV infection    Ambulatory dysfunction    Hypernatremia    Stage 3 chronic kidney disease    Bilateral leg edema    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    JESENIA (obstructive sleep apnea)    Intermittent claudication of both lower extremities due to atherosclerosis ([place] [address] )    [facility]    SOB (shortness of breath)    Chronic indwelling Davis catheter    Bradycardia    Chronic hypoxemic respiratory failure ([place] [address] )       Current Outpatient Prescriptions on File Prior to Visit   Medication Sig Dispense Refill    acetaminophen (TYLENOL) 500 mg tablet Take 500 mg by mouth every 6 (six) hours as needed for mild pain      albuterol (2 5 mg/3 mL) 0 083 % nebulizer solution Take 2 5 mg by nebulization every 6 (six) hours as needed for wheezing      albuterol (PROVENTIL HFA,VENTOLIN HFA) 90 mcg/act inhaler Inhale 2 puffs every 6 (six) hours as needed for wheezing      allopurinol (ZYLOPRIM) 100 mg tablet Take 400 mg by mouth every morning        aspirin (ECOTRIN LOW STRENGTH) 81 mg EC tablet Take 81 mg by mouth daily      atorvastatin (LIPITOR) 10 mg tablet Take 10 mg by mouth daily      buPROPion (WELLBUTRIN SR) 150 mg 12 hr tablet Take 150 mg by mouth daily      carBAMazepine (TEGretol) 100 mg chewable tablet Chew 100 mg 3 (three) times a day      Cholecalciferol (VITAMIN D) 2000 units CAPS Take 1 capsule by mouth daily        Citric Ee-Mxtlovzkzub-Tl Carb (RENACIDIN) SOLN Indications: 60ml to irrigate baca once a week      diltiazem (CARDIZEM SR) 90 mg 12 hr capsule Take 2 capsules (180 mg total) by mouth every 12 (twelve) hours 30 capsule 0    divalproex sodium (DEPAKOTE ER) 250 mg 24 hr tablet Take 1 tablet (250 mg total) by mouth daily 30 tablet 0    fenofibrate micronized (LOFIBRA) 67 MG capsule Take 67 mg by mouth every morning before breakfast      ferrous sulfate 325 (65 Fe) mg tablet Take 325 mg by mouth 2 (two) times a day      fluticasone furoate-vilanterol (BREO ELLIPTA) Inhale 1 puff daily      furosemide (LASIX) 40 mg tablet Take 1 tablet (40 mg total) by mouth 2 (two) times a day 180 tablet 3    Levothyroxine Sodium 88 MCG CAPS Take 88 mcg by mouth every morning        metoprolol tartrate (LOPRESSOR) 50 mg tablet Take 1 tablet (50 mg total) by mouth every 12 (twelve) hours 60 tablet 0    nitroglycerin (NITROSTAT) 0 4 mg SL tablet Place 1 tablet (0 4 mg total) under the tongue every 5 (five) minutes as needed for chest pain 90 tablet 0    potassium chloride (K-DUR,KLOR-CON) 20 mEq tablet Take 1 tablet (20 mEq total) by mouth daily 15 tablet 0    Sennosides-Docusate Sodium (SENNA PLUS PO) Take by mouth daily as needed      warfarin (COUMADIN) 3 mg tablet One tablet daily or as otherwise directed  90 tablet 2     No current facility-administered medications on file prior to visit          Allergies   Allergen Reactions    Ramipril Anaphylaxis    Spiriva Handihaler [Tiotropium Bromide Monohydrate] Swelling    Penicillins

## 2018-07-31 ENCOUNTER — HOSPITAL ENCOUNTER (OUTPATIENT)
Dept: MRI IMAGING | Facility: HOSPITAL | Age: 73
End: 2018-07-31
Attending: PSYCHIATRY & NEUROLOGY
Payer: MEDICARE

## 2018-07-31 ENCOUNTER — HOSPITAL ENCOUNTER (OUTPATIENT)
Dept: NEUROLOGY | Facility: HOSPITAL | Age: 73
Discharge: HOME | End: 2018-07-31
Attending: PSYCHIATRY & NEUROLOGY
Payer: MEDICARE

## 2018-07-31 DIAGNOSIS — G40.909 SEIZURE DISORDER (HCC): ICD-10-CM

## 2018-07-31 PROCEDURE — 95816 EEG AWAKE AND DROWSY: CPT

## 2018-07-31 PROCEDURE — 95816 EEG AWAKE AND DROWSY: CPT | Performed by: PSYCHIATRY & NEUROLOGY

## 2018-08-01 ENCOUNTER — TELEPHONE (OUTPATIENT)
Dept: PULMONOLOGY | Facility: CLINIC | Age: 73
End: 2018-08-01

## 2018-08-06 ENCOUNTER — TELEPHONE (OUTPATIENT)
Dept: NEUROLOGY | Facility: CLINIC | Age: 73
End: 2018-08-06

## 2018-08-06 NOTE — TELEPHONE ENCOUNTER
Alba Solano, patient's daughter, wanted to discuss EMG results with Dr Gris Montes (she is going away on vacation soon and wanted to discuss before she leaves) and patient is not sched for f/u until September    [phone]

## 2018-08-15 LAB — INR PPP: 2.2 (ref 0.86–1.17)

## 2018-08-16 ENCOUNTER — ANTICOAG VISIT (OUTPATIENT)
Dept: CARDIOLOGY CLINIC | Facility: CLINIC | Age: 73
End: 2018-08-16

## 2018-08-16 NOTE — PROGRESS NOTES
Per ROSA continue present regimen recheck INR in 2 weeks   Called pt home and no answer   Message was left with instructions

## 2018-08-24 ENCOUNTER — TELEPHONE (OUTPATIENT)
Dept: CARDIOLOGY CLINIC | Facility: CLINIC | Age: 73
End: 2018-08-24

## 2018-08-24 ENCOUNTER — OFFICE VISIT (OUTPATIENT)
Dept: CARDIOLOGY CLINIC | Facility: CLINIC | Age: 73
End: 2018-08-24
Payer: MEDICARE

## 2018-08-24 ENCOUNTER — HOSPITAL ENCOUNTER (OUTPATIENT)
Dept: PULMONOLOGY | Facility: HOSPITAL | Age: 73
Discharge: HOME | End: 2018-08-24
Payer: MEDICARE

## 2018-08-24 VITALS
OXYGEN SATURATION: 93 % | SYSTOLIC BLOOD PRESSURE: 110 MMHG | DIASTOLIC BLOOD PRESSURE: 50 MMHG | WEIGHT: 183 LBS | HEIGHT: 60 IN | BODY MASS INDEX: 35.93 KG/M2 | HEART RATE: 54 BPM

## 2018-08-24 DIAGNOSIS — I50.32 CHRONIC DIASTOLIC CONGESTIVE HEART FAILURE (HCC): Primary | ICD-10-CM

## 2018-08-24 DIAGNOSIS — E78.2 MIXED HYPERLIPIDEMIA: ICD-10-CM

## 2018-08-24 DIAGNOSIS — J44.9 CHRONIC OBSTRUCTIVE PULMONARY DISEASE, UNSPECIFIED COPD TYPE (HCC): Chronic | ICD-10-CM

## 2018-08-24 DIAGNOSIS — I70.213 INTERMITTENT CLAUDICATION OF BOTH LOWER EXTREMITIES DUE TO ATHEROSCLEROSIS (HCC): ICD-10-CM

## 2018-08-24 DIAGNOSIS — I48.0 PAROXYSMAL ATRIAL FIBRILLATION (HCC): ICD-10-CM

## 2018-08-24 DIAGNOSIS — I10 ESSENTIAL HYPERTENSION: Chronic | ICD-10-CM

## 2018-08-24 PROBLEM — I50.33 ACUTE ON CHRONIC DIASTOLIC CONGESTIVE HEART FAILURE (HCC): Status: RESOLVED | Noted: 2017-12-10 | Resolved: 2018-08-24

## 2018-08-24 PROCEDURE — 94060 EVALUATION OF WHEEZING: CPT

## 2018-08-24 PROCEDURE — 94726 PLETHYSMOGRAPHY LUNG VOLUMES: CPT | Performed by: INTERNAL MEDICINE

## 2018-08-24 PROCEDURE — 94060 EVALUATION OF WHEEZING: CPT | Performed by: INTERNAL MEDICINE

## 2018-08-24 PROCEDURE — 94760 N-INVAS EAR/PLS OXIMETRY 1: CPT

## 2018-08-24 PROCEDURE — 99214 OFFICE O/P EST MOD 30 MIN: CPT | Performed by: INTERNAL MEDICINE

## 2018-08-24 PROCEDURE — 94727 GAS DIL/WSHOT DETER LNG VOL: CPT

## 2018-08-24 PROCEDURE — 94729 DIFFUSING CAPACITY: CPT

## 2018-08-24 PROCEDURE — 94729 DIFFUSING CAPACITY: CPT | Performed by: INTERNAL MEDICINE

## 2018-08-24 RX ORDER — ALBUTEROL SULFATE 2.5 MG/3ML
2.5 SOLUTION RESPIRATORY (INHALATION) ONCE
Status: COMPLETED | OUTPATIENT
Start: 2018-08-24 | End: 2018-08-24

## 2018-08-24 RX ADMIN — ALBUTEROL SULFATE 2.5 MG: 2.5 SOLUTION RESPIRATORY (INHALATION) at 10:55

## 2018-08-24 NOTE — PROGRESS NOTES
Cardiology Consultation     Flex Rashid  [id number]  1945  [address] [address]    Chief complaints:  Bilateral lower extremity claudication and CHF    History of present illness:  80-YEAR-OLD FEMALE PATIENT WITH PAST MEDICAL HISTORY OF CORONARY ARTERY DISEASE   Status post PCI, peripheral artery disease, hypertension, hyperlipidemia, CKD is here for follow-up of diastolic CHF and bilateral intermittent claudication  Patient gets bilateral claudication with minimal exertion right leg worse than left leg which is read with rest   Patient denies having any leg ulceration or gangrene  Patient denies having any rest pain  she also complains of lower extremity edema which gets worse by the end of the day  The lower extremity edema has significantly improved since last visit    She had serum creatinine checked last week and was 1 43    Past Medical History:   Diagnosis Date    Atrial fibrillation ([facility] )     Brain aneurysm     CAD (coronary artery disease)     Cardiac disease     had stents 12 years ago in [address] [place] [address] CH[facility]congestive [facility] failure) [facility]     CKD (chronic kidney disease)     COPD [facility]ch[facility] obstructive pulmonary disease) ([facility]     Dementia     Dementia     Disease of thyroid gland     Hyperlipidemia     Hyperlipidemia     Hypertension     MI [facility]myocardial infarction) ([facility] )     Migraine     Renal disorder     Seizures ([facility] )     Stroke ([place] [address] )      Social History     Social History    Marital status:      Spouse name: N/A    Number of children: 3    Years of education: N/A     Occupational History    retired      Social History Main Topics    Smoking status: Former Smoker    Smokeless tobacco: Never Used    Alcohol use No    Drug use: No    Sexual activity: Not on file     Other Topics Concern    Not on file     Social History Narrative    No narrative on file      Family History   Problem Relation Age of Onset    Heart disease Father     Parkinsonism Father     Macular degeneration Mother     Glaucoma Mother     Diabetes Brother     Heart disease Brother      Past Surgical History:   Procedure Laterality Date    APPENDECTOMY      BLADDER TUMOR EXCISION      BRAIN SURGERY      CARDIAC SURGERY      CORONARY STENT PLACEMENT      5 stents    EYE SURGERY      MANDIBLE FRACTURE SURGERY      TONSILLECTOMY      TUBAL LIGATION      UTERINE FIBROID SURGERY         Current Outpatient Prescriptions:     albuterol (2 5 mg/3 mL) 0 083 % nebulizer solution, Take 2 5 mg by nebulization every 6 (six) hours as needed for wheezing, Disp: , Rfl:     allopurinol (ZYLOPRIM) 100 mg tablet, Take 300 mg by mouth every morning  , Disp: , Rfl:     aspirin (ECOTRIN LOW STRENGTH) 81 mg EC tablet, Take 81 mg by mouth daily, Disp: , Rfl:     atorvastatin (LIPITOR) 10 mg tablet, Take 10 mg by mouth daily, Disp: , Rfl:     buPROPion (WELLBUTRIN SR) 150 mg 12 hr tablet, Take 150 mg by mouth daily, Disp: , Rfl:     carBAMazepine (TEGretol) 100 mg chewable tablet, Chew 100 mg 3 (three) times a day, Disp: , Rfl:     Cholecalciferol (VITAMIN D) 2000 units CAPS, Take 1 capsule by mouth daily  , Disp: , Rfl:     diltiazem (CARDIZEM SR) 90 mg 12 hr capsule, Take 2 capsules (180 mg total) by mouth every 12 (twelve) hours, Disp: 30 capsule, Rfl: 0    divalproex sodium (DEPAKOTE ER) 250 mg 24 hr tablet, Take 1 tablet (250 mg total) by mouth daily, Disp: 30 tablet, Rfl: 0    fenofibrate micronized (LOFIBRA) 67 MG capsule, Take 67 mg by mouth every morning before breakfast, Disp: , Rfl:     ferrous sulfate 325 (65 Fe) mg tablet, Take 325 mg by mouth 2 (two) times a day, Disp: , Rfl:     fluticasone furoate-vilanterol (BREO ELLIPTA), Inhale 1 puff daily, Disp: , Rfl:     furosemide (LASIX) 40 mg tablet, Take 1 tablet (40 mg total) by mouth 2 (two) times a day, Disp: 180 tablet, Rfl: 3    Levothyroxine Sodium 88 MCG CAPS, Take 88 mcg by mouth every morning  , Disp: , Rfl:     metoprolol tartrate (LOPRESSOR) 50 mg tablet, Take 1 tablet (50 mg total) by mouth every 12 (twelve) hours, Disp: 60 tablet, Rfl: 0    potassium chloride (K-DUR,KLOR-CON) 20 mEq tablet, Take 1 tablet (20 mEq total) by mouth daily, Disp: 15 tablet, Rfl: 0    Sennosides-Docusate Sodium (SENNA PLUS PO), Take by mouth daily as needed, Disp: , Rfl:     warfarin (COUMADIN) 3 mg tablet, One tablet daily or as otherwise directed , Disp: 90 tablet, Rfl: 2    acetaminophen (TYLENOL) 500 mg tablet, Take 500 mg by mouth every 6 (six) hours as needed for mild pain, Disp: , Rfl:     albuterol (PROVENTIL HFA,VENTOLIN HFA) 90 mcg/act inhaler, Inhale 2 puffs every 6 (six) hours as needed for wheezing, Disp: , Rfl:     Citric Mw-Agnuxzbrstu-Kb Carb (RENACIDIN) SOLN, Indications: 60ml to irrigate baca once a week, Disp: , Rfl:     nitroglycerin (NITROSTAT) 0 4 mg SL tablet, Place 1 tablet (0 4 mg total) under the tongue every 5 (five) minutes as needed for chest pain, Disp: 90 tablet, Rfl: 0  No current facility-administered medications for this visit  Allergies   Allergen Reactions    Ramipril Anaphylaxis    Spiriva Handihaler [Tiotropium Bromide Monohydrate] Swelling    Penicillins      Vitals:    08/24/18 1451   BP: 110/50   Pulse: (!) 54   SpO2: 93%   Weight: 83 kg (183 lb)   Height: 5' 0 25" (1 53 m)       Labs: Anticoag visit on 08/16/2018   Component Date Value    INR 08/15/2018 2 20*   Anticoag visit on 07/20/2018   Component Date Value    INR 07/19/2018 1 60*   Appointment on 06/29/2018   Component Date Value    Vitamin B-12 06/29/2018 272     Folate 06/29/2018 18 9*    RPR 06/29/2018 Non-Reactive     TSH 3RD GENERATON 06/29/2018 2 580     Carbamazepine Lvl 06/29/2018 8 6      Imaging: No results found      Review of Systems:  Review of Systems   Constitutional: Negative for diaphoresis and fatigue  HENT: Negative for congestion and facial swelling  Eyes: Negative for photophobia and visual disturbance  Respiratory: Negative for chest tightness and shortness of breath  Cardiovascular: Positive for chest pain and palpitations  Negative for leg swelling  Gastrointestinal: Negative for abdominal pain and nausea  Endocrine: Negative for cold intolerance and heat intolerance  Musculoskeletal: Negative for arthralgias and myalgias  Skin: Negative for pallor and rash  Neurological: Negative for dizziness and tremors  Psychiatric/Behavioral: Negative for sleep disturbance  The patient is not nervous/anxious  Physical Exam:  Physical Exam   Constitutional: She is oriented to person, place, and time  She appears well-developed and well-nourished  HENT:   Head: Normocephalic and atraumatic  Eyes: Conjunctivae and EOM are normal  Pupils are equal, round, and reactive to light  Neck: Normal range of motion  Neck supple  No JVD present  No thyromegaly present  Cardiovascular: Normal rate, S1 normal, S2 normal, normal heart sounds and intact distal pulses  An irregularly irregular rhythm present  Exam reveals no gallop and no friction rub  No murmur heard  Pulses:       Carotid pulses are 2+ on the right side, and 2+ on the left side  Trace edema bilaterally   Pulmonary/Chest: Effort normal and breath sounds normal  No respiratory distress  She has no wheezes  She has no rales  Abdominal: Soft  Bowel sounds are normal  She exhibits no distension  There is no tenderness  There is no rebound and no guarding  Musculoskeletal: Normal range of motion  She exhibits no edema  Neurological: She is alert and oriented to person, place, and time  She has normal reflexes  No cranial nerve deficit  Skin: Skin is warm and dry  Psychiatric: She has a normal mood and affect         Discussion/Summary:  1  PAD, Bilateral lower extremity claudication:  - patient has severe bilateral lower extremity claudication which is interfering with her lifestyle  -VAS lower limb arterial duplex: Right lower limb-moderate diffuse disease throughout femoral and popliteal arteries, L lower limb- moderate diffuse disease throughout femoral and popliteal arteries with <50% stenosis of proximal superficial femoral artery, 50-75% stenosis in distal superficial femoral artery  -VAS abdominal aorta/iliacs: 50-75% of R proximal common iliac artery, >75% stenosis of distal external iliac artery  - will schedule the patient for aortogram with bilateral intra in 1 runoff  With or without Intervention of right iliac artery and left superficial femoral artery  risks, benefits and alternatives explained to the patient informed consent was obtained  patient was stop Coumadin 4 days before the procedure  -Continue ASA, statin  -   Right CFA access     2  Chronic diastolic CHF:   currently euvolemic  -ECHO Dec 2017 shows EF 55-65%, no regional wall motion abnormalities, G1DD  - continue Lasix at current dose  -Low salt diet, daily weights     3  CAD s/p PCI: No anginal symptoms, continue ASA, statin, BB      4  A Fib/flutter: Continue metoprolol for rate control, coumadin for anticoagulation     5  HTN: Stable, continue present regimen     6   HLD: Continue statin      follow-up up with me 1 week after the procedure which is between 2-3 weeks from now

## 2018-08-24 NOTE — ADDENDUM NOTE
Addended byNewell Felty on: 8/24/2018 03:33 PM     Modules accepted: Orders
Liv Baeza  (RN)  2017 17:29:24

## 2018-08-24 NOTE — TELEPHONE ENCOUNTER
Patient is to be set up for Cardiac lower extremity angiography with Dr Ernestine Weber at [facility]  Lab order in chart  Please call daughter Dylan Tian at [phone] to set up  Thanks

## 2018-08-27 ENCOUNTER — TELEPHONE (OUTPATIENT)
Dept: CARDIOLOGY CLINIC | Facility: CLINIC | Age: 73
End: 2018-08-27

## 2018-08-27 DIAGNOSIS — Z91.041 ALLERGY TO RADIOGRAPHIC DYE: Primary | ICD-10-CM

## 2018-08-27 DIAGNOSIS — R94.39 ABNORMAL STRESS TEST: Primary | ICD-10-CM

## 2018-08-27 RX ORDER — PREDNISONE 20 MG/1
TABLET ORAL
Qty: 6 TABLET | Refills: 0 | Status: SHIPPED | OUTPATIENT
Start: 2018-08-27 | End: 2018-09-06 | Stop reason: ALTCHOICE

## 2018-08-27 RX ORDER — FAMOTIDINE 20 MG/1
TABLET, FILM COATED ORAL
Qty: 2 TABLET | Refills: 0 | Status: CANCELLED | OUTPATIENT
Start: 2018-08-27

## 2018-08-27 RX ORDER — PREDNISONE 20 MG/1
TABLET ORAL
Qty: 6 TABLET | Refills: 0 | Status: CANCELLED | OUTPATIENT
Start: 2018-08-27

## 2018-08-27 RX ORDER — DIPHENHYDRAMINE HCL 25 MG
TABLET ORAL
Qty: 30 TABLET | Refills: 0 | Status: SHIPPED | OUTPATIENT
Start: 2018-08-27 | End: 2018-11-12 | Stop reason: SDUPTHER

## 2018-08-27 RX ORDER — FAMOTIDINE 20 MG/1
TABLET, FILM COATED ORAL
Qty: 2 TABLET | Refills: 0 | Status: SHIPPED | OUTPATIENT
Start: 2018-08-27 | End: 2018-09-06 | Stop reason: ALTCHOICE

## 2018-08-27 RX ORDER — DIPHENHYDRAMINE HCL 25 MG
TABLET ORAL
Qty: 30 TABLET | Refills: 0 | Status: CANCELLED | OUTPATIENT
Start: 2018-08-27

## 2018-08-27 NOTE — TELEPHONE ENCOUNTER
S/w Daughter, states that her Mom has Dye allergy, allergy dye prep sent to pharmacy   Paperwork faxed to the hospital for scheduling

## 2018-08-31 ENCOUNTER — LAB (OUTPATIENT)
Dept: LAB | Facility: HOSPITAL | Age: 73
End: 2018-08-31
Attending: INTERNAL MEDICINE
Payer: MEDICARE

## 2018-08-31 LAB
ERYTHROCYTE [DISTWIDTH] IN BLOOD BY AUTOMATED COUNT: 13.2 % (ref 11.6–15.1)
HCT VFR BLD AUTO: 36.6 % (ref 34.8–46.1)
HGB BLD-MCNC: 11.8 G/DL (ref 11.5–15.4)
MCH RBC QN AUTO: 33.7 PG (ref 26.8–34.3)
MCHC RBC AUTO-ENTMCNC: 32.2 G/DL (ref 31.4–37.4)
MCV RBC AUTO: 105 FL (ref 82–98)
PLATELET # BLD AUTO: 163 THOUSANDS/UL (ref 149–390)
PMV BLD AUTO: 12.3 FL (ref 8.9–12.7)
RBC # BLD AUTO: 3.5 MILLION/UL (ref 3.81–5.12)
WBC # BLD AUTO: 5.65 THOUSAND/UL (ref 4.31–10.16)

## 2018-08-31 PROCEDURE — 85027 COMPLETE CBC AUTOMATED: CPT | Performed by: INTERNAL MEDICINE

## 2018-08-31 PROCEDURE — 36415 COLL VENOUS BLD VENIPUNCTURE: CPT | Performed by: INTERNAL MEDICINE

## 2018-09-05 ENCOUNTER — TELEPHONE (OUTPATIENT)
Dept: NEUROLOGY | Facility: CLINIC | Age: 73
End: 2018-09-05

## 2018-09-05 ENCOUNTER — TELEPHONE (OUTPATIENT)
Dept: INTERVENTIONAL RADIOLOGY/VASCULAR | Facility: HOSPITAL | Age: 73
End: 2018-09-05

## 2018-09-05 ENCOUNTER — TELEPHONE (OUTPATIENT)
Dept: NON INVASIVE DIAGNOSTICS | Facility: HOSPITAL | Age: 73
End: 2018-09-05

## 2018-09-05 RX ORDER — SODIUM CHLORIDE 9 MG/ML
75 INJECTION, SOLUTION INTRAVENOUS CONTINUOUS
Status: CANCELLED | OUTPATIENT
Start: 2018-09-05

## 2018-09-05 NOTE — TELEPHONE ENCOUNTER
Called L/M for patient's daughter inquiring as to whether or not the Neuro pysch test and MRI results

## 2018-09-06 ENCOUNTER — OFFICE VISIT (OUTPATIENT)
Dept: NEUROLOGY | Facility: CLINIC | Age: 73
End: 2018-09-06
Payer: MEDICARE

## 2018-09-06 ENCOUNTER — TELEPHONE (OUTPATIENT)
Dept: SURGERY | Facility: HOSPITAL | Age: 73
End: 2018-09-06

## 2018-09-06 VITALS
BODY MASS INDEX: 36.32 KG/M2 | HEART RATE: 48 BPM | WEIGHT: 185 LBS | HEIGHT: 60 IN | SYSTOLIC BLOOD PRESSURE: 110 MMHG | DIASTOLIC BLOOD PRESSURE: 52 MMHG

## 2018-09-06 DIAGNOSIS — G40.909 SEIZURE DISORDER (HCC): ICD-10-CM

## 2018-09-06 DIAGNOSIS — F02.81 DEMENTIA ASSOCIATED WITH OTHER UNDERLYING DISEASE WITH BEHAVIORAL DISTURBANCE (HCC): Primary | ICD-10-CM

## 2018-09-06 PROCEDURE — 99213 OFFICE O/P EST LOW 20 MIN: CPT | Performed by: PSYCHIATRY & NEUROLOGY

## 2018-09-06 NOTE — TELEPHONE ENCOUNTER
S/w pharmacy and called in meds for prep  Meds were sent to [address] [place] [address] rather than the [place] [address]  I did clarify meds and they will be sent to [address] [place] [address] by tonight  I informed both Daughter Jailyn Ayala and Donny Meyers at [facility] care

## 2018-09-06 NOTE — PROGRESS NOTES
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    2  Seizure disorder [facility])        Plan:    Discussion:  Discussed with patient and her daughter CT of the brain EEG and blood work results, her Ward is 16/30, discussed different medications including Aricept Exelon and Namenda, they would like to hold off on that for now and understand the risks, they are unable to do a neuropsych testing, she was advised to continue with mentally stimulating exercises, continue with seizure precautions, her blood work is being monitored by her family physician, to be under constant supervision, to go to the hospital if has any worsening symptoms and call me otherwise to see me back in 3-4 months and follow up with her other physicians  Subjective:    HPI   Patient is here in follow-up for her memory difficulty for several years, she is accompanied with her daughter, since her last visit she is about the same, they could not do an MRI scan of the brain secondary to history of aneurysm clip, CT scan of the brain was unremarkable, EEG did not show epileptiform activity but it was abnormal, she has been having issues with her short-term memory, no headaches vision difficulty or speech difficulty, no focal weakness, no other neurological complaints      Vitals:    09/06/18 1138   BP: 110/52   BP Location: Left arm   Patient Position: Sitting   Cuff Size: Large   Pulse: (!) 48   Weight: 83 9 kg (185 lb)   Height: 5' 0 25" (1 53 m)       Current Medications    Current Outpatient Prescriptions:     acetaminophen (TYLENOL) 500 mg tablet, Take 500 mg by mouth every 6 (six) hours as needed for mild pain, Disp: , Rfl:     albuterol (2 5 mg/3 mL) 0 083 % nebulizer solution, Take 2 5 mg by nebulization every 6 (six) hours as needed for wheezing, Disp: , Rfl:     albuterol (PROVENTIL HFA,VENTOLIN HFA) 90 mcg/act inhaler, Inhale 2 puffs every 6 (six) hours as needed for wheezing, Disp: , Rfl:     allopurinol (ZYLOPRIM) 300 mg tablet, Take 300 mg by mouth every morning  , Disp: , Rfl:     aspirin (ECOTRIN LOW STRENGTH) 81 mg EC tablet, Take 81 mg by mouth daily, Disp: , Rfl:     atorvastatin (LIPITOR) 10 mg tablet, Take 10 mg by mouth daily, Disp: , Rfl:     buPROPion (WELLBUTRIN SR) 150 mg 12 hr tablet, Take 150 mg by mouth daily, Disp: , Rfl:     carBAMazepine (TEGretol) 100 mg chewable tablet, Chew 100 mg 3 (three) times a day, Disp: , Rfl:     Cholecalciferol (VITAMIN D) 2000 units CAPS, Take 1 capsule by mouth daily  , Disp: , Rfl:     Citric Go-Wekhxkvavrr-Pc Carb (RENACIDIN) SOLN, Indications: 60ml to irrigate baca once a week, Disp: , Rfl:     diltiazem (CARDIZEM SR) 90 mg 12 hr capsule, Take 2 capsules (180 mg total) by mouth every 12 (twelve) hours, Disp: 30 capsule, Rfl: 0    diphenhydrAMINE (BENADRYL) 25 mg tablet, TAKE 1 TABLET THE EVENING PRIOR AND 1 TABLET THE MORNING OF PROCEDURE, Disp: 30 tablet, Rfl: 0    divalproex sodium (DEPAKOTE ER) 250 mg 24 hr tablet, Take 1 tablet (250 mg total) by mouth daily, Disp: 30 tablet, Rfl: 0    fenofibrate micronized (LOFIBRA) 67 MG capsule, Take 67 mg by mouth every morning before breakfast, Disp: , Rfl:     ferrous sulfate 325 (65 Fe) mg tablet, Take 325 mg by mouth 2 (two) times a day, Disp: , Rfl:     fluticasone furoate-vilanterol (BREO ELLIPTA), Inhale 1 puff daily, Disp: , Rfl:     furosemide (LASIX) 40 mg tablet, Take 1 tablet (40 mg total) by mouth 2 (two) times a day, Disp: 180 tablet, Rfl: 3    Levothyroxine Sodium 88 MCG CAPS, Take 88 mcg by mouth every morning  , Disp: , Rfl:     metoprolol tartrate (LOPRESSOR) 50 mg tablet, Take 1 tablet (50 mg total) by mouth every 12 (twelve) hours, Disp: 60 tablet, Rfl: 0    nitroglycerin (NITROSTAT) 0 4 mg SL tablet, Place 1 tablet (0 4 mg total) under the tongue every 5 (five) minutes as needed for chest pain, Disp: 90 tablet, Rfl: 0    potassium chloride (K-DUR,KLOR-CON) 20 mEq tablet, Take 1 tablet (20 mEq total) by mouth daily, Disp: 15 tablet, Rfl: 0    Sennosides-Docusate Sodium (SENNA PLUS PO), Take by mouth daily as needed, Disp: , Rfl:     warfarin (COUMADIN) 3 mg tablet, One tablet daily or as otherwise directed , Disp: 90 tablet, Rfl: 2      Allergies  Ramipril; Spiriva handihaler [tiotropium bromide monohydrate]; and Penicillins    Past Medical History  Past Medical History:   Diagnosis Date    Atrial fibrillation ([place] [address] )     Brain aneurysm     CAD (coronary artery disease)     Cardiac disease     had stents 12 years ago in [facility]    CHF (congestive heart failure) ([place] [address] )     CKD (chronic kidney disease)     COPD (chronic obstructive pulmonary disease) ([place] [address] )     Dementia     Dementia     Disease of thyroid gland     Hyperlipidemia     Hyperlipidemia     Hypertension     MI (myocardial infarction) ([place] [address] )     Migraine     Renal disorder     Seizures ([place] [address] )     Stroke [facility])          Past Surgical History:  Past Surgical History:   Procedure Laterality Date    APPENDECTOMY      BLADDER TUMOR EXCISION      BRAIN SURGERY      1998 ?  CARDIAC SURGERY      CORONARY STENT PLACEMENT      5 stents    EYE SURGERY      MANDIBLE FRACTURE SURGERY      TONSILLECTOMY      TUBAL LIGATION      UTERINE FIBROID SURGERY           Family History:  Family History   Problem Relation Age of Onset    Heart disease Father     Parkinsonism Father     Macular degeneration Mother     Glaucoma Mother     Diabetes Brother     Heart disease Brother        Social History:   reports that she has quit smoking  She has never used smokeless tobacco  She reports that she does not drink alcohol or use drugs      I have reviewed the past medical history, surgical history, social and family history, current medications, allergies vitals, review of systems, and updated this information as appropriate today  Objective:    I have reviewed the past medical history, surgical history, social and family history, current medications, allergies vitals, review of systems, and updated this information as appropriate today  Physical Exam    Neurological Exam      GENERAL:  Cooperative in no acute distress  Well-developed and well-nourished    HEAD and NECK   Head is atraumatic normocephalic with no lesions or masses  Neck is supple with full range of motion    CARDIOVASCULAR  Carotid Arteries-no carotid bruits  NEUROLOGIC:  Mental Status-the patient is awake alert and oriented without aphasia or apraxia, [place] is 16/30  Cranial Nerves: Visual fields are full to confrontation  Extraocular movements are full without nystagmus  Pupils are 2-1/2 mm and reactive  Face is symmetrical to light touch  Movements of facial expression move symmetrically  Hearing is normal to finger rub bilaterally  Soft palate lifts symmetrically  Shoulder shrug is symmetrical  Tongue is midline without atrophy  Motor: No drift is noted on arm extension  Strength is full in the upper and lower extremities with normal bulk and tone  Sensory: Intact to temperature and vibratory sensation in the upper and lower extremities bilaterally  Cortical function is intact  Gait is unremarkable        ROS:  Review of Systems   Constitutional: Negative  Negative for appetite change and fever  HENT: Negative  Negative for hearing loss, tinnitus, trouble swallowing and voice change  Eyes: Negative  Negative for photophobia and pain  Respiratory: Negative  Negative for shortness of breath  Cardiovascular: Negative  Negative for palpitations  Gastrointestinal: Negative  Negative for nausea and vomiting  Endocrine: Negative  Negative for cold intolerance and heat intolerance  Genitourinary: Negative  Negative for dysuria, frequency and urgency  Musculoskeletal: Positive for gait problem   Negative for back pain, myalgias and neck pain  Skin: Negative  Negative for rash  Neurological: Positive for tremors and headaches  Negative for dizziness, seizures, syncope, facial asymmetry, speech difficulty, weakness, light-headedness and numbness  Hematological: Negative  Does not bruise/bleed easily  Psychiatric/Behavioral: Positive for confusion  Negative for hallucinations and sleep disturbance

## 2018-09-07 ENCOUNTER — APPOINTMENT (OUTPATIENT)
Dept: CT IMAGING | Facility: HOSPITAL | Age: 73
DRG: 252 | End: 2018-09-07
Payer: MEDICARE

## 2018-09-07 ENCOUNTER — HOSPITAL ENCOUNTER (INPATIENT)
Dept: INTERVENTIONAL RADIOLOGY/VASCULAR | Facility: HOSPITAL | Age: 73
LOS: 2 days | Discharge: HOME | DRG: 252 | End: 2018-09-09
Attending: INTERNAL MEDICINE | Admitting: STUDENT IN AN ORGANIZED HEALTH CARE EDUCATION/TRAINING PROGRAM
Payer: MEDICARE

## 2018-09-07 DIAGNOSIS — I73.9 PVD (PERIPHERAL VASCULAR DISEASE) (HCC): Primary | ICD-10-CM

## 2018-09-07 DIAGNOSIS — T14.8XXA HEMATOMA: ICD-10-CM

## 2018-09-07 DIAGNOSIS — I70.213 INTERMITTENT CLAUDICATION OF BOTH LOWER EXTREMITIES DUE TO ATHEROSCLEROSIS (HCC): ICD-10-CM

## 2018-09-07 LAB
ABO GROUP BLD: NORMAL
ANION GAP SERPL CALCULATED.3IONS-SCNC: 6 MMOL/L (ref 4–13)
ANION GAP SERPL CALCULATED.3IONS-SCNC: 7 MMOL/L (ref 4–13)
BLD GP AB SCN SERPL QL: NEGATIVE
BUN SERPL-MCNC: 36 MG/DL (ref 5–25)
BUN SERPL-MCNC: 38 MG/DL (ref 5–25)
CALCIUM SERPL-MCNC: 8.8 MG/DL (ref 8.3–10.1)
CALCIUM SERPL-MCNC: 9.4 MG/DL (ref 8.3–10.1)
CHLORIDE SERPL-SCNC: 107 MMOL/L (ref 100–108)
CHLORIDE SERPL-SCNC: 110 MMOL/L (ref 100–108)
CO2 SERPL-SCNC: 26 MMOL/L (ref 21–32)
CO2 SERPL-SCNC: 31 MMOL/L (ref 21–32)
CREAT SERPL-MCNC: 1.53 MG/DL (ref 0.6–1.3)
CREAT SERPL-MCNC: 1.72 MG/DL (ref 0.6–1.3)
ERYTHROCYTE [DISTWIDTH] IN BLOOD BY AUTOMATED COUNT: 13.3 % (ref 11.6–15.1)
GFR SERPL CREATININE-BSD FRML MDRD: 29 ML/MIN/1.73SQ M
GFR SERPL CREATININE-BSD FRML MDRD: 34 ML/MIN/1.73SQ M
GLUCOSE P FAST SERPL-MCNC: 187 MG/DL (ref 65–99)
GLUCOSE P FAST SERPL-MCNC: 189 MG/DL (ref 65–99)
GLUCOSE SERPL-MCNC: 187 MG/DL (ref 65–140)
GLUCOSE SERPL-MCNC: 189 MG/DL (ref 65–140)
HCT VFR BLD AUTO: 32 % (ref 34.8–46.1)
HCT VFR BLD AUTO: 32 % (ref 34.8–46.1)
HGB BLD-MCNC: 10.2 G/DL (ref 11.5–15.4)
HGB BLD-MCNC: 10.4 G/DL (ref 11.5–15.4)
INR PPP: 1.2 (ref 0.86–1.17)
INR PPP: 1.32 (ref 0.86–1.17)
KCT BLD-ACNC: 154 SEC (ref 89–137)
KCT BLD-ACNC: 172 SEC (ref 89–137)
KCT BLD-ACNC: 195 SEC (ref 89–137)
KCT BLD-ACNC: 230 SEC (ref 89–137)
MCH RBC QN AUTO: 33.5 PG (ref 26.8–34.3)
MCHC RBC AUTO-ENTMCNC: 32.5 G/DL (ref 31.4–37.4)
MCV RBC AUTO: 103 FL (ref 82–98)
PLATELET # BLD AUTO: 154 THOUSANDS/UL (ref 149–390)
PMV BLD AUTO: 12.2 FL (ref 8.9–12.7)
POTASSIUM SERPL-SCNC: 4.1 MMOL/L (ref 3.5–5.3)
POTASSIUM SERPL-SCNC: 4.6 MMOL/L (ref 3.5–5.3)
PROTHROMBIN TIME: 15.1 SECONDS (ref 11.8–14.2)
PROTHROMBIN TIME: 16.3 SECONDS (ref 11.8–14.2)
RBC # BLD AUTO: 3.1 MILLION/UL (ref 3.81–5.12)
RH BLD: POSITIVE
SODIUM SERPL-SCNC: 143 MMOL/L (ref 136–145)
SODIUM SERPL-SCNC: 144 MMOL/L (ref 136–145)
SPECIMEN EXPIRATION DATE: NORMAL
SPECIMEN SOURCE: ABNORMAL
WBC # BLD AUTO: 9.36 THOUSAND/UL (ref 4.31–10.16)

## 2018-09-07 PROCEDURE — 74174 CTA ABD&PLVS W/CONTRAST: CPT

## 2018-09-07 PROCEDURE — 99152 MOD SED SAME PHYS/QHP 5/>YRS: CPT | Performed by: INTERNAL MEDICINE

## 2018-09-07 PROCEDURE — 86901 BLOOD TYPING SEROLOGIC RH(D): CPT | Performed by: STUDENT IN AN ORGANIZED HEALTH CARE EDUCATION/TRAINING PROGRAM

## 2018-09-07 PROCEDURE — C1894 INTRO/SHEATH, NON-LASER: HCPCS | Performed by: INTERNAL MEDICINE

## 2018-09-07 PROCEDURE — 37224 HB FEM/POPL REVAS W/TLA: CPT | Performed by: INTERNAL MEDICINE

## 2018-09-07 PROCEDURE — 85027 COMPLETE CBC AUTOMATED: CPT | Performed by: STUDENT IN AN ORGANIZED HEALTH CARE EDUCATION/TRAINING PROGRAM

## 2018-09-07 PROCEDURE — 85018 HEMOGLOBIN: CPT | Performed by: NURSE PRACTITIONER

## 2018-09-07 PROCEDURE — 99153 MOD SED SAME PHYS/QHP EA: CPT | Performed by: INTERNAL MEDICINE

## 2018-09-07 PROCEDURE — C1769 GUIDE WIRE: HCPCS | Performed by: INTERNAL MEDICINE

## 2018-09-07 PROCEDURE — 85347 COAGULATION TIME ACTIVATED: CPT

## 2018-09-07 PROCEDURE — 85610 PROTHROMBIN TIME: CPT | Performed by: INTERNAL MEDICINE

## 2018-09-07 PROCEDURE — 93005 ELECTROCARDIOGRAM TRACING: CPT

## 2018-09-07 PROCEDURE — 71275 CT ANGIOGRAPHY CHEST: CPT

## 2018-09-07 PROCEDURE — 86900 BLOOD TYPING SEROLOGIC ABO: CPT | Performed by: STUDENT IN AN ORGANIZED HEALTH CARE EDUCATION/TRAINING PROGRAM

## 2018-09-07 PROCEDURE — 80048 BASIC METABOLIC PNL TOTAL CA: CPT | Performed by: STUDENT IN AN ORGANIZED HEALTH CARE EDUCATION/TRAINING PROGRAM

## 2018-09-07 PROCEDURE — 047L3Z1 DILATION OF LEFT FEMORAL ARTERY USING DRUG-COATED BALLOON, PERCUTANEOUS APPROACH: ICD-10-PCS | Performed by: INTERNAL MEDICINE

## 2018-09-07 PROCEDURE — 85610 PROTHROMBIN TIME: CPT | Performed by: STUDENT IN AN ORGANIZED HEALTH CARE EDUCATION/TRAINING PROGRAM

## 2018-09-07 PROCEDURE — B40DYZZ PLAIN RADIOGRAPHY OF AORTA AND BILATERAL LOWER EXTREMITY ARTERIES USING OTHER CONTRAST: ICD-10-PCS | Performed by: INTERNAL MEDICINE

## 2018-09-07 PROCEDURE — 75716 ARTERY X-RAYS ARMS/LEGS: CPT | Performed by: INTERNAL MEDICINE

## 2018-09-07 PROCEDURE — 86850 RBC ANTIBODY SCREEN: CPT | Performed by: STUDENT IN AN ORGANIZED HEALTH CARE EDUCATION/TRAINING PROGRAM

## 2018-09-07 PROCEDURE — 80048 BASIC METABOLIC PNL TOTAL CA: CPT | Performed by: INTERNAL MEDICINE

## 2018-09-07 PROCEDURE — C1887 CATHETER, GUIDING: HCPCS | Performed by: INTERNAL MEDICINE

## 2018-09-07 PROCEDURE — 85014 HEMATOCRIT: CPT | Performed by: NURSE PRACTITIONER

## 2018-09-07 RX ORDER — CLOPIDOGREL BISULFATE 75 MG/1
75 TABLET ORAL DAILY
Qty: 90 TABLET | Refills: 3 | Status: SHIPPED | OUTPATIENT
Start: 2018-09-07 | End: 2018-09-09

## 2018-09-07 RX ORDER — POTASSIUM CHLORIDE 20 MEQ/1
20 TABLET, EXTENDED RELEASE ORAL DAILY
Status: DISCONTINUED | OUTPATIENT
Start: 2018-09-08 | End: 2018-09-09 | Stop reason: HOSPADM

## 2018-09-07 RX ORDER — CHLORHEXIDINE GLUCONATE 0.12 MG/ML
15 RINSE ORAL EVERY 12 HOURS SCHEDULED
Status: DISCONTINUED | OUTPATIENT
Start: 2018-09-07 | End: 2018-09-07

## 2018-09-07 RX ORDER — DIVALPROEX SODIUM 250 MG/1
250 TABLET, EXTENDED RELEASE ORAL DAILY
Status: DISCONTINUED | OUTPATIENT
Start: 2018-09-08 | End: 2018-09-09 | Stop reason: HOSPADM

## 2018-09-07 RX ORDER — ALBUTEROL SULFATE 90 UG/1
2 AEROSOL, METERED RESPIRATORY (INHALATION) EVERY 6 HOURS PRN
Status: DISCONTINUED | OUTPATIENT
Start: 2018-09-07 | End: 2018-09-09 | Stop reason: HOSPADM

## 2018-09-07 RX ORDER — FLUTICASONE FUROATE AND VILANTEROL 100; 25 UG/1; UG/1
1 POWDER RESPIRATORY (INHALATION) DAILY
Status: DISCONTINUED | OUTPATIENT
Start: 2018-09-08 | End: 2018-09-09 | Stop reason: HOSPADM

## 2018-09-07 RX ORDER — METOPROLOL TARTRATE 50 MG/1
50 TABLET, FILM COATED ORAL EVERY 12 HOURS SCHEDULED
Status: DISCONTINUED | OUTPATIENT
Start: 2018-09-07 | End: 2018-09-09 | Stop reason: HOSPADM

## 2018-09-07 RX ORDER — ALBUTEROL SULFATE 2.5 MG/3ML
2.5 SOLUTION RESPIRATORY (INHALATION) EVERY 6 HOURS PRN
Status: DISCONTINUED | OUTPATIENT
Start: 2018-09-07 | End: 2018-09-09 | Stop reason: HOSPADM

## 2018-09-07 RX ORDER — ATORVASTATIN CALCIUM 10 MG/1
10 TABLET, FILM COATED ORAL DAILY
Status: DISCONTINUED | OUTPATIENT
Start: 2018-09-08 | End: 2018-09-09 | Stop reason: HOSPADM

## 2018-09-07 RX ORDER — DILTIAZEM HYDROCHLORIDE 90 MG/1
180 CAPSULE, EXTENDED RELEASE ORAL EVERY 12 HOURS SCHEDULED
Status: DISCONTINUED | OUTPATIENT
Start: 2018-09-07 | End: 2018-09-09 | Stop reason: HOSPADM

## 2018-09-07 RX ORDER — ACETAMINOPHEN 325 MG/1
500 TABLET ORAL EVERY 6 HOURS PRN
Status: DISCONTINUED | OUTPATIENT
Start: 2018-09-07 | End: 2018-09-09 | Stop reason: HOSPADM

## 2018-09-07 RX ORDER — HEPARIN SODIUM 1000 [USP'U]/ML
INJECTION, SOLUTION INTRAVENOUS; SUBCUTANEOUS CODE/TRAUMA/SEDATION MEDICATION
Status: COMPLETED | OUTPATIENT
Start: 2018-09-07 | End: 2018-09-07

## 2018-09-07 RX ORDER — CARBAMAZEPINE 100 MG/1
100 TABLET, CHEWABLE ORAL 3 TIMES DAILY
Status: DISCONTINUED | OUTPATIENT
Start: 2018-09-07 | End: 2018-09-09 | Stop reason: HOSPADM

## 2018-09-07 RX ORDER — SODIUM CHLORIDE 9 MG/ML
75 INJECTION, SOLUTION INTRAVENOUS CONTINUOUS
Status: DISCONTINUED | OUTPATIENT
Start: 2018-09-07 | End: 2018-09-08

## 2018-09-07 RX ORDER — FENTANYL CITRATE 50 UG/ML
INJECTION, SOLUTION INTRAMUSCULAR; INTRAVENOUS CODE/TRAUMA/SEDATION MEDICATION
Status: COMPLETED | OUTPATIENT
Start: 2018-09-07 | End: 2018-09-07

## 2018-09-07 RX ORDER — LIDOCAINE HYDROCHLORIDE 10 MG/ML
INJECTION, SOLUTION INFILTRATION; PERINEURAL CODE/TRAUMA/SEDATION MEDICATION
Status: COMPLETED | OUTPATIENT
Start: 2018-09-07 | End: 2018-09-07

## 2018-09-07 RX ORDER — MORPHINE SULFATE 2 MG/ML
2 INJECTION, SOLUTION INTRAMUSCULAR; INTRAVENOUS EVERY 4 HOURS PRN
Status: DISCONTINUED | OUTPATIENT
Start: 2018-09-07 | End: 2018-09-09 | Stop reason: HOSPADM

## 2018-09-07 RX ORDER — HYDRALAZINE HYDROCHLORIDE 20 MG/ML
5 INJECTION INTRAMUSCULAR; INTRAVENOUS ONCE
Status: COMPLETED | OUTPATIENT
Start: 2018-09-07 | End: 2018-09-07

## 2018-09-07 RX ORDER — MORPHINE SULFATE 4 MG/ML
INJECTION, SOLUTION INTRAMUSCULAR; INTRAVENOUS CODE/TRAUMA/SEDATION MEDICATION
Status: DISCONTINUED | OUTPATIENT
Start: 2018-09-07 | End: 2018-09-09 | Stop reason: HOSPADM

## 2018-09-07 RX ORDER — CLOPIDOGREL BISULFATE 75 MG/1
75 TABLET ORAL DAILY
Status: DISCONTINUED | OUTPATIENT
Start: 2018-09-07 | End: 2018-09-07

## 2018-09-07 RX ORDER — CLOPIDOGREL BISULFATE 75 MG/1
TABLET ORAL CODE/TRAUMA/SEDATION MEDICATION
Status: COMPLETED | OUTPATIENT
Start: 2018-09-07 | End: 2018-09-07

## 2018-09-07 RX ORDER — SODIUM CHLORIDE 9 MG/ML
75 INJECTION, SOLUTION INTRAVENOUS CONTINUOUS
Status: DISCONTINUED | OUTPATIENT
Start: 2018-09-07 | End: 2018-09-07

## 2018-09-07 RX ORDER — MIDAZOLAM HYDROCHLORIDE 1 MG/ML
2 INJECTION INTRAMUSCULAR; INTRAVENOUS ONCE
Status: DISCONTINUED | OUTPATIENT
Start: 2018-09-07 | End: 2018-09-07

## 2018-09-07 RX ORDER — ALLOPURINOL 300 MG/1
300 TABLET ORAL EVERY MORNING
Status: DISCONTINUED | OUTPATIENT
Start: 2018-09-08 | End: 2018-09-09 | Stop reason: HOSPADM

## 2018-09-07 RX ORDER — NITROGLYCERIN 0.4 MG/1
0.4 TABLET SUBLINGUAL
Status: DISCONTINUED | OUTPATIENT
Start: 2018-09-07 | End: 2018-09-09 | Stop reason: HOSPADM

## 2018-09-07 RX ORDER — ASPIRIN 81 MG/1
81 TABLET ORAL DAILY
Status: DISCONTINUED | OUTPATIENT
Start: 2018-09-07 | End: 2018-09-09 | Stop reason: HOSPADM

## 2018-09-07 RX ORDER — FUROSEMIDE 20 MG/1
40 TABLET ORAL 2 TIMES DAILY
Status: DISCONTINUED | OUTPATIENT
Start: 2018-09-07 | End: 2018-09-07

## 2018-09-07 RX ORDER — MIDAZOLAM HYDROCHLORIDE 1 MG/ML
INJECTION INTRAMUSCULAR; INTRAVENOUS CODE/TRAUMA/SEDATION MEDICATION
Status: COMPLETED | OUTPATIENT
Start: 2018-09-07 | End: 2018-09-07

## 2018-09-07 RX ORDER — LEVOTHYROXINE SODIUM 88 UG/1
88 TABLET ORAL
Status: DISCONTINUED | OUTPATIENT
Start: 2018-09-08 | End: 2018-09-09 | Stop reason: HOSPADM

## 2018-09-07 RX ORDER — BUPROPION HYDROCHLORIDE 150 MG/1
150 TABLET, EXTENDED RELEASE ORAL DAILY
Status: DISCONTINUED | OUTPATIENT
Start: 2018-09-08 | End: 2018-09-09 | Stop reason: HOSPADM

## 2018-09-07 RX ORDER — ASPIRIN 325 MG
TABLET, DELAYED RELEASE (ENTERIC COATED) ORAL CODE/TRAUMA/SEDATION MEDICATION
Status: COMPLETED | OUTPATIENT
Start: 2018-09-07 | End: 2018-09-07

## 2018-09-07 RX ORDER — FERROUS SULFATE 325(65) MG
325 TABLET ORAL 2 TIMES DAILY
Status: DISCONTINUED | OUTPATIENT
Start: 2018-09-07 | End: 2018-09-09 | Stop reason: HOSPADM

## 2018-09-07 RX ADMIN — CLOPIDOGREL 600 MG: 75 TABLET, FILM COATED ORAL at 11:08

## 2018-09-07 RX ADMIN — IODIXANOL 45 ML: 320 INJECTION, SOLUTION INTRAVASCULAR at 11:12

## 2018-09-07 RX ADMIN — MORPHINE SULFATE 2 MG: 4 INJECTION, SOLUTION INTRAMUSCULAR; INTRAVENOUS at 14:37

## 2018-09-07 RX ADMIN — ACETAMINOPHEN 488 MG: 325 TABLET, FILM COATED ORAL at 20:19

## 2018-09-07 RX ADMIN — IODIXANOL 100 ML: 320 INJECTION, SOLUTION INTRAVASCULAR at 16:16

## 2018-09-07 RX ADMIN — CARBAMAZEPINE 100 MG: 100 TABLET, CHEWABLE ORAL at 20:22

## 2018-09-07 RX ADMIN — SODIUM CHLORIDE 75 ML/HR: 0.9 INJECTION, SOLUTION INTRAVENOUS at 16:46

## 2018-09-07 RX ADMIN — FENTANYL CITRATE 100 MCG: 50 INJECTION, SOLUTION INTRAMUSCULAR; INTRAVENOUS at 10:26

## 2018-09-07 RX ADMIN — DILTIAZEM HYDROCHLORIDE 180 MG: 90 CAPSULE, EXTENDED RELEASE ORAL at 20:23

## 2018-09-07 RX ADMIN — HYDRALAZINE HYDROCHLORIDE 5 MG: 20 INJECTION INTRAMUSCULAR; INTRAVENOUS at 12:12

## 2018-09-07 RX ADMIN — MIDAZOLAM HYDROCHLORIDE 2 MG: 1 INJECTION, SOLUTION INTRAMUSCULAR; INTRAVENOUS at 10:26

## 2018-09-07 RX ADMIN — LIDOCAINE HYDROCHLORIDE 9 ML: 10 INJECTION, SOLUTION INFILTRATION; PERINEURAL at 10:24

## 2018-09-07 RX ADMIN — HEPARIN SODIUM 5000 UNITS: 1000 INJECTION INTRAVENOUS; SUBCUTANEOUS at 10:41

## 2018-09-07 RX ADMIN — METOPROLOL TARTRATE 50 MG: 50 TABLET ORAL at 20:21

## 2018-09-07 NOTE — DISCHARGE INSTRUCTIONS
After Heart Catheterization   AMBULATORY CARE:   Call 911 for any of the following:   · You have any of the following signs of a heart attack:      ¨ Squeezing, pressure, or pain in your chest that lasts longer than 5 minutes or returns    ¨ Discomfort or pain in your back, neck, jaw, stomach, or arm     ¨ Trouble breathing    ¨ Nausea or vomiting    ¨ Lightheadedness or a sudden cold sweat, especially with chest pain or trouble breathing    · You have any of the following signs of a stroke:      ¨ Numbness or drooping on one side of your face     ¨ Weakness in an arm or leg    ¨ Confusion or difficulty speaking    ¨ Dizziness, a severe headache, or vision loss    · You feel lightheaded, short of breath, and have chest pain  · You cough up blood  · You have trouble breathing  · You cannot stop the bleeding from your wound even after you hold firm pressure for 10 minutes  Seek care immediately if:   · Blood soaks through your bandage  · Your stitches come apart  · Your arm or leg feels numb, cool, or looks pale  · Your wound gets swollen quickly  Contact your healthcare provider if:   · You have a fever or chills  · Your wound is red, swollen, or draining pus  · Your wound looks more bruised or you have new bruising on the side of your leg or arm  · You have nausea or are vomiting  · Your skin is itchy, swollen, or you have a rash  · You have questions or concerns about your condition or care  Medicines: You may need any of the following:  · Blood thinners    help prevent blood clots  Examples of blood thinners include heparin and warfarin  Clots can cause strokes, heart attacks, and death  The following are general safety guidelines to follow while you are taking a blood thinner:    ¨ Watch for bleeding and bruising while you take blood thinners  Watch for bleeding from your gums or nose  Watch for blood in your urine and bowel movements   Use a soft washcloth on your skin, and a soft toothbrush to brush your teeth  This can keep your skin and gums from bleeding  If you shave, use an electric shaver  Do not play contact sports  ¨ Tell your dentist and other healthcare providers that you take anticoagulants  Wear a bracelet or necklace that says you take this medicine  ¨ Do not start or stop any medicines unless your healthcare provider tells you to  Many medicines cannot be used with blood thinners  ¨ Tell your healthcare provider right away if you forget to take the medicine, or if you take too much  ¨ Warfarin  is a blood thinner that you may need to take  The following are things you should be aware of if you take warfarin  § Foods and medicines can affect the amount of warfarin in your blood  Do not make major changes to your diet while you take warfarin  Warfarin works best when you eat about the same amount of vitamin K every day  Vitamin K is found in green leafy vegetables and certain other foods  Ask for more information about what to eat when you are taking warfarin  § You will need to see your healthcare provider for follow-up visits when you are on warfarin  You will need regular blood tests  These tests are used to decide how much medicine you need  · Acetaminophen  helps decrease pain and fever  This medicine is available without a doctor's order  Ask how much medicine is safe to take, and how often to take it  Acetaminophen can cause liver damage if not taken correctly  · Take your medicine as directed  Contact your healthcare provider if you think your medicine is not helping or if you have side effects  Tell him or her if you are allergic to any medicine  Keep a list of the medicines, vitamins, and herbs you take  Include the amounts, and when and why you take them  Bring the list or the pill bottles to follow-up visits  Carry your medicine list with you in case of an emergency  Bathing:   You may be able to shower the day after your procedure  Remove your pressure bandage before you shower  Do not take baths or go in hot tubs or pools  Carefully wash the wound with soap and water  Pat the area dry  Care for your wound as directed:  Change your bandage when it gets wet or dirty  A small bandage can be placed on your wound after you remove the pressure bandage  Do not put powders, lotions, or creams on your wound  They may cause your wound to get infected  Monitor your wound every day for signs of infection, such as redness, swelling, or pus  Mild bruising is normal and expected  If bleeding from your wound occurs:  Apply firm, steady pressure to stop the bleeding  Apply pressure with a clean gauze or towel for 5 to 10 minutes  Call 911 if bleeding becomes heavy or does not stop  Activity:  Do not lift anything heavier than 5 pounds until directed by your healthcare provider  Heavy lifting can put stress on your wound and cause bleeding  Do not push or pull with the arm that was used for the procedure  Do not do vigorous activity for at least 48 hours  Vigorous activity may cause bleeding from your wound  Rest and do quiet activities  Short walks to the bathroom and around the house are okay  Limit your stair climbing to prevent bleeding  Ask your healthcare provider when you can return to your normal activities  Do not strain when you have a bowel movement:  Your wound may bleed if you strain to have a bowel movement  Keep your legs flat on the floor and your hips at a 90° angle  Talk to your healthcare provider if you are constipated  You may need medicine to make it easier for you to have a bowel movement and to prevent straining  Drink liquids as directed:  Liquids will help flush the contrast liquid from your body  Ask how much liquid to drink each day and which liquids are best for you  Driving:  Ask your healthcare provider when it is okay for you to drive   He may tell you to wait 48 hours before you drive to decrease your risk for bleeding  Returning to work: You may not be able to return to work for at least 2 days after your procedure if your job involves heavy lifting  Ask your healthcare provider when it is okay for you to return to work  © 2017 [employer] Information is for End User's use only and may not be sold, redistributed or otherwise used for commercial purposes  All illustrations and images included in CareNotes® are the copyrighted property of A [employer] M [employer]  or [employer]  The above information is an  only  It is not intended as medical advice for individual conditions or treatments  Talk to your doctor, nurse or pharmacist before following any medical regimen to see if it is safe and effective for you

## 2018-09-07 NOTE — H&P (VIEW-ONLY)
Cardiology Consultation     Silvano Caldwell  [id number]  1945  [address] [place] [address]    Chief complaints:  Bilateral lower extremity claudication and CHF    History of present illness:  80-YEAR-OLD FEMALE PATIENT WITH PAST MEDICAL HISTORY OF CORONARY ARTERY DISEASE   Status post PCI, peripheral artery disease, hypertension, hyperlipidemia, CKD is here for follow-up of diastolic CHF and bilateral intermittent claudication  Patient gets bilateral claudication with minimal exertion right leg worse than left leg which is read with rest   Patient denies having any leg ulceration or gangrene  Patient denies having any rest pain  she also complains of lower extremity edema which gets worse by the end of the day  The lower extremity edema has significantly improved since last visit    She had serum creatinine checked last week and was 1 43    Past Medical History:   Diagnosis Date    Atrial fibrillation ([facility] )     Brain aneurysm     CAD (coronary artery disease)     Cardiac disease     had stents 12 years ago in [address] [place] [address] CH[facility] (congestive heart failure) [facility])     CKD (chronic kidney disease)     COPD (chronic obstructive pulmonary disease) ([facility])     Dementia     Dementia     Disease of thyroid gland     Hyperlipidemia     Hyperlipidemia     Hypertension     MI (myocardial infarction) ([facility] )     Migraine     Renal disorder     Seizures ([facility] )     Stroke ([place] [address] )      Social History     Social History    Marital status:      Spouse name: N/A    Number of children: 3    Years of education: N/A     Occupational History    retired      Social History Main Topics    Smoking status: Former Smoker    Smokeless tobacco: Never Used    Alcohol use No    Drug use: No    Sexual activity: Not on file     Other Topics Concern    Not on file     Social History Narrative    No narrative on file      Family History   Problem Relation Age of Onset    Heart disease Father     Parkinsonism Father     Macular degeneration Mother     Glaucoma Mother     Diabetes Brother     Heart disease Brother      Past Surgical History:   Procedure Laterality Date    APPENDECTOMY      BLADDER TUMOR EXCISION      BRAIN SURGERY      CARDIAC SURGERY      CORONARY STENT PLACEMENT      5 stents    EYE SURGERY      MANDIBLE FRACTURE SURGERY      TONSILLECTOMY      TUBAL LIGATION      UTERINE FIBROID SURGERY         Current Outpatient Prescriptions:     albuterol (2 5 mg/3 mL) 0 083 % nebulizer solution, Take 2 5 mg by nebulization every 6 (six) hours as needed for wheezing, Disp: , Rfl:     allopurinol (ZYLOPRIM) 100 mg tablet, Take 300 mg by mouth every morning  , Disp: , Rfl:     aspirin (ECOTRIN LOW STRENGTH) 81 mg EC tablet, Take 81 mg by mouth daily, Disp: , Rfl:     atorvastatin (LIPITOR) 10 mg tablet, Take 10 mg by mouth daily, Disp: , Rfl:     buPROPion (WELLBUTRIN SR) 150 mg 12 hr tablet, Take 150 mg by mouth daily, Disp: , Rfl:     carBAMazepine (TEGretol) 100 mg chewable tablet, Chew 100 mg 3 (three) times a day, Disp: , Rfl:     Cholecalciferol (VITAMIN D) 2000 units CAPS, Take 1 capsule by mouth daily  , Disp: , Rfl:     diltiazem (CARDIZEM SR) 90 mg 12 hr capsule, Take 2 capsules (180 mg total) by mouth every 12 (twelve) hours, Disp: 30 capsule, Rfl: 0    divalproex sodium (DEPAKOTE ER) 250 mg 24 hr tablet, Take 1 tablet (250 mg total) by mouth daily, Disp: 30 tablet, Rfl: 0    fenofibrate micronized (LOFIBRA) 67 MG capsule, Take 67 mg by mouth every morning before breakfast, Disp: , Rfl:     ferrous sulfate 325 (65 Fe) mg tablet, Take 325 mg by mouth 2 (two) times a day, Disp: , Rfl:     fluticasone furoate-vilanterol (BREO ELLIPTA), Inhale 1 puff daily, Disp: , Rfl:     furosemide (LASIX) 40 mg tablet, Take 1 tablet (40 mg total) by mouth 2 (two) times a day, Disp: 180 tablet, Rfl: 3    Levothyroxine Sodium 88 MCG CAPS, Take 88 mcg by mouth every morning  , Disp: , Rfl:     metoprolol tartrate (LOPRESSOR) 50 mg tablet, Take 1 tablet (50 mg total) by mouth every 12 (twelve) hours, Disp: 60 tablet, Rfl: 0    potassium chloride (K-DUR,KLOR-CON) 20 mEq tablet, Take 1 tablet (20 mEq total) by mouth daily, Disp: 15 tablet, Rfl: 0    Sennosides-Docusate Sodium (SENNA PLUS PO), Take by mouth daily as needed, Disp: , Rfl:     warfarin (COUMADIN) 3 mg tablet, One tablet daily or as otherwise directed , Disp: 90 tablet, Rfl: 2    acetaminophen (TYLENOL) 500 mg tablet, Take 500 mg by mouth every 6 (six) hours as needed for mild pain, Disp: , Rfl:     albuterol (PROVENTIL HFA,VENTOLIN HFA) 90 mcg/act inhaler, Inhale 2 puffs every 6 (six) hours as needed for wheezing, Disp: , Rfl:     Citric Xl-Cnrxvexkove-Aa Carb (RENACIDIN) SOLN, Indications: 60ml to irrigate baca once a week, Disp: , Rfl:     nitroglycerin (NITROSTAT) 0 4 mg SL tablet, Place 1 tablet (0 4 mg total) under the tongue every 5 (five) minutes as needed for chest pain, Disp: 90 tablet, Rfl: 0  No current facility-administered medications for this visit  Allergies   Allergen Reactions    Ramipril Anaphylaxis    Spiriva Handihaler [Tiotropium Bromide Monohydrate] Swelling    Penicillins      Vitals:    08/24/18 1451   BP: 110/50   Pulse: (!) 54   SpO2: 93%   Weight: 83 kg (183 lb)   Height: 5' 0 25" (1 53 m)       Labs: Anticoag visit on 08/16/2018   Component Date Value    INR 08/15/2018 2 20*   Anticoag visit on 07/20/2018   Component Date Value    INR 07/19/2018 1 60*   Appointment on 06/29/2018   Component Date Value    Vitamin B-12 06/29/2018 272     Folate 06/29/2018 18 9*    RPR 06/29/2018 Non-Reactive     TSH 3RD GENERATON 06/29/2018 2 580     Carbamazepine Lvl 06/29/2018 8 6      Imaging: No results found      Review of Systems:  Review of Systems   Constitutional: Negative for diaphoresis and fatigue  HENT: Negative for congestion and facial swelling  Eyes: Negative for photophobia and visual disturbance  Respiratory: Negative for chest tightness and shortness of breath  Cardiovascular: Positive for chest pain and palpitations  Negative for leg swelling  Gastrointestinal: Negative for abdominal pain and nausea  Endocrine: Negative for cold intolerance and heat intolerance  Musculoskeletal: Negative for arthralgias and myalgias  Skin: Negative for pallor and rash  Neurological: Negative for dizziness and tremors  Psychiatric/Behavioral: Negative for sleep disturbance  The patient is not nervous/anxious  Physical Exam:  Physical Exam   Constitutional: She is oriented to person, place, and time  She appears well-developed and well-nourished  HENT:   Head: Normocephalic and atraumatic  Eyes: Conjunctivae and EOM are normal  Pupils are equal, round, and reactive to light  Neck: Normal range of motion  Neck supple  No JVD present  No thyromegaly present  Cardiovascular: Normal rate, S1 normal, S2 normal, normal heart sounds and intact distal pulses  An irregularly irregular rhythm present  Exam reveals no gallop and no friction rub  No murmur heard  Pulses:       Carotid pulses are 2+ on the right side, and 2+ on the left side  Trace edema bilaterally   Pulmonary/Chest: Effort normal and breath sounds normal  No respiratory distress  She has no wheezes  She has no rales  Abdominal: Soft  Bowel sounds are normal  She exhibits no distension  There is no tenderness  There is no rebound and no guarding  Musculoskeletal: Normal range of motion  She exhibits no edema  Neurological: She is alert and oriented to person, place, and time  She has normal reflexes  No cranial nerve deficit  Skin: Skin is warm and dry  Psychiatric: She has a normal mood and affect         Discussion/Summary:  1  PAD, Bilateral lower extremity claudication:  - patient has severe bilateral lower extremity claudication which is interfering with her lifestyle  -VAS lower limb arterial duplex: Right lower limb-moderate diffuse disease throughout femoral and popliteal arteries, L lower limb- moderate diffuse disease throughout femoral and popliteal arteries with <50% stenosis of proximal superficial femoral artery, 50-75% stenosis in distal superficial femoral artery  -VAS abdominal aorta/iliacs: 50-75% of R proximal common iliac artery, >75% stenosis of distal external iliac artery  - will schedule the patient for aortogram with bilateral intra in 1 runoff  With or without Intervention of right iliac artery and left superficial femoral artery  risks, benefits and alternatives explained to the patient informed consent was obtained  patient was stop Coumadin 4 days before the procedure  -Continue ASA, statin  -   Right CFA access     2  Chronic diastolic CHF:   currently euvolemic  -ECHO Dec 2017 shows EF 55-65%, no regional wall motion abnormalities, G1DD  - continue Lasix at current dose  -Low salt diet, daily weights     3  CAD s/p PCI: No anginal symptoms, continue ASA, statin, BB      4  A Fib/flutter: Continue metoprolol for rate control, coumadin for anticoagulation     5  HTN: Stable, continue present regimen     6   HLD: Continue statin      follow-up up with me 1 week after the procedure which is between 2-3 weeks from now

## 2018-09-07 NOTE — PROGRESS NOTES
Patient with history of CAD S/P PCI, peripheral artery disease, hypertension, hyperlipidemia, CKD presenting for outpatient lower extremity angiogram   Was initially seen by Dr Monahan in office on 08/24/2018 with complaints of intermittent claudication  Had bilateral claudication with minimal exertion, right leg worse than left  No ulceration or gangrene  Had cardiac lower extremity angiography done  No stents placed  Was loaded with Plavix  Had balloon angioplasty  Developed a hematoma afterwards  Pressure held to stop bleeding from site, has been holding pressure for 30+ minutes  Currently in extreme pain  Will continue to hold pressure  Will admit under stepdown for further management  IV morphine 2 mg ordered PRN for pain

## 2018-09-07 NOTE — BRIEF OP NOTE (RAD/CATH)
CARDIAC LOWER EXTREMITY ANGIOGRAPHY    Left distal SFA angioplasty with drug coated balloon  Procedure Note    PATIENT NAME: Dee Castelan  : 1945  MRN: [id number]     Pre-op Diagnosis:   1  PVD (peripheral vascular disease) ([facility] )    2  Intermittent claudication of both lower extremities due to atherosclerosis ([facility])      Post-op Diagnosis:   1  PVD (peripheral vascular disease) ([facility] )    2  Intermittent claudication of both lower extremities due to atherosclerosis [facility])        Surgeon:   Juvenal Salgado MD    Estimated Blood Loss:   5 cc    Procedure description:   Patient was brought to cath lab of risks and benefits were explained informed consent was obtained from the patient and her daughter who is the power-of-  Patient was prepped and draped in sterile fashion  2 percent xylocaine administered in the right groin and access was obtained using micropuncture needle and 5 [place] sheath was initially placed in the right common femoral artery  Five [employer] Omniflush was placed in the distal aorta below the renal arteries and aortogram with bilateral iliac angiogram was performed with 40 cc nonionic contrast   The endhole catheter was placed in the left superficial femoral artery and selective left SFA angiogram with infrapopliteal runoff was performed  Right common femoral artery angiogram and infrapopliteal runoff was performed through the right common femoral artery sheath  Patient had significant lesion in the left distal SFA  Pressure gradient was measured across the lesion using an 0 catheter and there was more than 50 mm peak gradient across the distal SFA lesion  The lesion was carefully crossed with a Bentson wire  Heparin was used anticoagulation  Angioplasty was initially performed with  5 x 40 mm balloon  This was followed by angioplasty with 5 x 40 mm Lutonix drug-eluting balloon  There was no residual stenosis with excellent angiographic result    ACT was maintained more than 200 throughout the procedure  Patient tolerated procedure well with no complications and returned to recovery room in good condition  Hemostasis will be obtained by manual compression when ACT is less than 150                                                                                                     Findings: Aorta:   Mild aneurysm of the distal aorta    Right side:  No significant stenosis in the right common iliac artery, internal iliac artery  There is a 70 percent lesion in the distal external iliac artery  No significant stenosis in the right common femoral artery  There is no significant stenosis in profunda femoral artery  There is 70 percent stenosis in the mid superficial femoral artery  No significant stenosis in the popliteal artery  No significant stenosis in tibioperoneal trunk, anterior tibial artery, posterior tibial artery and peroneal artery    Left side:  No significant stenosis in the right common iliac artery, internal iliac artery and external iliac artery  No significant stenosis in the right common femoral artery  There is no significant stenosis in profunda femoral artery  There is 70 percent stenosis in the distal superficial femoral artery  Peak pressure gradient was more than 50 mm hg    No significant stenosis in the popliteal artery  No significant stenosis in tibioperoneal trunk, anterior tibial artery, posterior tibial artery and peroneal artery    Complications:  None during the procedure    Anesthesia: Conscious sedation and Local     Plan:     Plavix for 3 months  Restart Coumadin tomorrow  DC home in 6 hours    Meena Temple MD     Date: 9/7/2018  Time: 6:48 PM

## 2018-09-07 NOTE — DISCHARGE SUMMARY
Discharge Summary - Ynoi Hwang 71 y o  female MRN: [id number]    Unit/Bed#:  Encounter: [id number]    Admission Date: 9/7/2018    Admitting Diagnosis: Intermittent claudication of both lower extremities due to atherosclerosis [facility]) [I[facility] 213]    HPI:   Patient with history of CAD S/P PCI, peripheral artery disease, hypertension, hyperlipidemia, CKD presenting for outpatient lower extremity angiogram   Was initially seen by Dr Monahan in office on 08/24/2018 with complaints of intermittent claudication  Had bilateral claudication with minimal exertion, right leg worse than left  No ulceration or gangrene  Had cardiac lower extremity angiography done  No stents placed  Was loaded with Plavix  Had balloon angioplasty  Developed a hematoma afterwards  Pressure held to stop bleeding from site  Currently in extreme pain  Will continue to hold pressure  Will admit under SLIM service for further management  Procedures Performed:   Orders Placed This Encounter   Procedures    Cardiac lower extremity angiography       Summary of Hospital Course:  Had cardiac lower extremity angiography done  No stents placed  Had balloon angioplasty  Developed a hematoma afterwards  Pressure held to stop bleeding from site  Currently in extreme pain  Will continue to hold pressure  Will admit under SLIM service for further management  Significant Findings, Care, Treatment and Services Provided:   Peripheral artery disease    Complications: Hematoma from insertion site  Discharge Diagnosis:     Peripheral artery disease    Resolved Problems  Date Reviewed: 9/6/2018    None          Condition at Discharge: good       Discharge instructions/Information to patient and family:   Will admit under SLIM service  Cannot be discharged  Provisions for Follow-Up Care:  See after visit summary for information related to follow-up care and any pertinent home health orders        PCP: Kole Mckeon DO    Disposition: Home    Planned Readmission: No    Discharge Statement   I spent 35 minutes discharging the patient  This time was spent on the day of discharge  I had direct contact with the patient on the day of discharge  Additional documentation is required if more than 30 minutes were spent on discharge  Discharge Medications:  See after visit summary for reconciled discharge medications provided to patient and family

## 2018-09-07 NOTE — PROGRESS NOTES
I personally spoke to patient's daughter, Shiva Arriaga, concerning events that occurred during recovery and plan for CTA  I specifically discussed the reason for the scan to be to rule out the possibility of RP bleed/active extravasation  We talked about Ms Tobias's decreased renal function and contrast load earlier in the day and how that may put her at risk for kidney injury, possibly to the point of needing dialysis temporarily or even permanently    Ms Rosaura Molina states she and her mother had not previously discussed dialysis but that she (Ms Rosaura Molina) accepted the risk of possible TAM or ARF requiring dialysis on her mother's behalf and is agreeable to scan with IV contrast

## 2018-09-07 NOTE — H&P
History and Physical - Critical Care  Gabbie Shoemaker 71 y o  female MRN: [id number]  Unit/Bed#:  Encounter: [id number]     Reason for Admission / Chief Complaint: right groin hematoma s/p arteriogram     History of Present Illness: Gabbie Shoemaker is a 71 y o  female who presents from PACU after having an arteriogram with SFA angioplasty for intermittent bilateral claudication  She has a past medical history of COPD, chronic diastolic heart failure, atrial fibrillation and atrial flutter, hypertension, bradycardia, dementia, chronic kidney disease stage 3, and chronic indwelling Davis secondary to neurogenic bladder  She did well post procedure and the sheath was pulled per protocol  She developed a hematoma afterwards  Manual pressure was held by cath lab staff for >30 minutes  A femstop was then applied  The patient did get hypotensive with an SBP in the 80s and bradycardiac with a HR in the low 50s  She was given fluids and taken for a stat CT scan to rule out retroperitonel bleed  She will be admitted to the stepdown unit  History obtained from chart review and unobtainable from patient due to mental status       Past Medical History:  Past Medical History:   Diagnosis Date    Atrial fibrillation ([facility] )     Brain aneurysm     CAD (coronary artery disease)     Cardiac disease     had stents 12 years ago in [address] [place] [address] CHF (congestive heart failure) ([facility] )     CKD (chronic kidney disease)     COPD (chronic obstructive pulmonary disease) (HCC)     Dementia     Dementia     Disease of thyroid gland     GERD (gastroesophageal reflux disease)     Hyperlipidemia     Hyperlipidemia     Hypertension     MI (myocardial infarction) ([facility] )     Migraine     Renal disorder     Seizures ([place] [address] )     Stroke [facility])         Past Surgical History:  Past Surgical History:   Procedure Laterality Date    APPENDECTOMY      BLADDER TUMOR EXCISION      BRAIN SURGERY      1998  clip right frontal lobe    CARDIAC SURGERY      COLONOSCOPY      CORONARY STENT PLACEMENT      5 stents    EYE SURGERY      MANDIBLE FRACTURE SURGERY      TONSILLECTOMY      TUBAL LIGATION      UTERINE FIBROID SURGERY          Past Family History:  Family History   Problem Relation Age of Onset    Heart disease Father     Parkinsonism Father     Macular degeneration Mother     Glaucoma Mother     Diabetes Brother     Heart disease Brother         Social History:  History   Smoking Status    Former Smoker    Quit date: 9/7/2007   Smokeless Tobacco    Never Used     History   Alcohol Use No     History   Drug Use No     Marital Status:        Medications:  Current Facility-Administered Medications   Medication Dose Route Frequency    acetaminophen (TYLENOL) tablet 488 mg  488 mg Oral Q6H PRN    albuterol (PROVENTIL HFA,VENTOLIN HFA) inhaler 2 puff  2 puff Inhalation Q6H PRN    albuterol inhalation solution 2 5 mg  2 5 mg Nebulization Q6H PRN    [START ON 9/8/2018] allopurinol (ZYLOPRIM) tablet 300 mg  300 mg Oral QAM    aspirin (ECOTRIN LOW STRENGTH) EC tablet 81 mg  81 mg Oral Daily    [START ON 9/8/2018] atorvastatin (LIPITOR) tablet 10 mg  10 mg Oral Daily    [START ON 9/8/2018] buPROPion (WELLBUTRIN SR) 12 hr tablet 150 mg  150 mg Oral Daily    carBAMazepine (TEGretol) chewable tablet 100 mg  100 mg Oral TID    clopidogrel (PLAVIX) tablet 75 mg  75 mg Oral Daily    diltiazem (CARDIZEM SR) 12 hr capsule 180 mg  180 mg Oral Q12H [facility]    [START ON 9/8/2018] divalproex sodium (DEPAKOTE ER) 24 hr tablet 250 mg  250 mg Oral Daily    ferrous sulfate tablet 325 mg  325 mg Oral BID    [START ON 9/8/2018] fluticasone-vilanterol (BREO ELLIPTA) 100-25 mcg/inh inhaler 1 puff  1 puff Inhalation Daily    furosemide (LASIX) tablet 40 mg  40 mg Oral BID    [START ON 9/8/2018] Levothyroxine Sodium CAPS 88 mcg  88 mcg Oral QAM    metoprolol tartrate (LOPRESSOR) tablet 50 mg  50 mg Oral Q12H [facility]    midazolam (VERSED) injection 2 mg  2 mg Intravenous Once    morphine 4 mg/mL injection    Code/Trauma/Sedation Med    morphine injection 2 mg  2 mg Intravenous Q4H PRN    nitroglycerin (NITROSTAT) SL tablet 0 4 mg  0 4 mg Sublingual Q5 Min PRN    [START ON 9/8/2018] potassium chloride (K-DUR,KLOR-CON) CR tablet 20 mEq  20 mEq Oral Daily    sodium chloride 0 9 % infusion  75 mL/hr Intravenous Continuous    sodium chloride 0 9 % infusion  75 mL/hr Intravenous Continuous     Home medications:  Prior to Admission medications    Medication Sig Start Date End Date Taking?  Authorizing Provider   allopurinol (ZYLOPRIM) 300 mg tablet Take 300 mg by mouth every morning     Yes Historical Provider, MD   aspirin (ECOTRIN LOW STRENGTH) 81 mg EC tablet Take 81 mg by mouth daily   Yes Historical Provider, MD   atorvastatin (LIPITOR) 10 mg tablet Take 10 mg by mouth daily   Yes Historical Provider, MD   buPROPion (WELLBUTRIN SR) 150 mg 12 hr tablet Take 150 mg by mouth daily   Yes Historical Provider, MD   carBAMazepine (TEGretol) 100 mg chewable tablet Chew 100 mg 3 (three) times a day   Yes Historical Provider, MD   Cholecalciferol (VITAMIN D) 2000 units CAPS Take 1 capsule by mouth daily     Yes Historical Provider, MD   Citric Xc-Zcdfxwxbjnj-Iv Carb (RENACIDIN) SOLN Indications: 60ml to irrigate baca once a week   Yes Historical Provider, MD   diltiazem (CARDIZEM SR) 90 mg 12 hr capsule Take 2 capsules (180 mg total) by mouth every 12 (twelve) hours 5/12/18  Yes Ani Barajas MD   diphenhydrAMINE (BENADRYL) 25 mg tablet TAKE 1 TABLET THE EVENING PRIOR AND 1 TABLET THE MORNING OF PROCEDURE 8/27/18  Yes Juvenal Salgado MD   divalproex sodium (DEPAKOTE ER) 250 mg 24 hr tablet Take 1 tablet (250 mg total) by mouth daily 4/19/18  Yes Cuong Garza MD   fenofibrate micronized (LOFIBRA) 67 MG capsule Take 67 mg by mouth every morning before breakfast   Yes Historical Provider, MD   ferrous sulfate 325 (65 Fe) mg tablet Take 325 mg by mouth 2 (two) times a day   Yes Historical Provider, MD   fluticasone furoate-vilanterol (BREO ELLIPTA) Inhale 1 puff daily   Yes Historical Provider, MD   furosemide (LASIX) 40 mg tablet Take 1 tablet (40 mg total) by mouth 2 (two) times a day 6/22/18  Yes Donavon Willson PA-C   Levothyroxine Sodium 88 MCG CAPS Take 88 mcg by mouth every morning     Yes Historical Provider, MD   metoprolol tartrate (LOPRESSOR) 50 mg tablet Take 1 tablet (50 mg total) by mouth every 12 (twelve) hours 6/19/18  Yes Shelly Negrete MD   nitroglycerin (NITROSTAT) 0 4 mg SL tablet Place 1 tablet (0 4 mg total) under the tongue every 5 (five) minutes as needed for chest pain 5/12/18  Yes Shelly Negrete MD   potassium chloride (K-DUR,KLOR-CON) 20 mEq tablet Take 1 tablet (20 mEq total) by mouth daily 4/18/18  Yes Enrique Quinonez MD   Sennosides-Docusate Sodium (SENNA PLUS PO) Take by mouth daily as needed   Yes Historical Provider, MD   warfarin (COUMADIN) 3 mg tablet One tablet daily or as otherwise directed  7/20/18  Yes Jody Gottlieb PA-C   acetaminophen (TYLENOL) 500 mg tablet Take 500 mg by mouth every 6 (six) hours as needed for mild pain    Historical Provider, MD   albuterol (2 5 mg/3 mL) 0 083 % nebulizer solution Take 2 5 mg by nebulization every 6 (six) hours as needed for wheezing    Historical Provider, MD   albuterol (PROVENTIL HFA,VENTOLIN HFA) 90 mcg/act inhaler Inhale 2 puffs every 6 (six) hours as needed for wheezing    Historical Provider, MD   clopidogrel (PLAVIX) 75 mg tablet Take 1 tablet (75 mg total) by mouth daily 9/7/18   Jody Gottlieb PA-C     Allergies: Allergies   Allergen Reactions    Spiriva Handihaler [Tiotropium Bromide Monohydrate] Swelling    Ramipril Facial Swelling    Penicillins      unsure        ROS:   Review of Systems   Constitutional: Negative for chills and fever  HENT: Negative  Eyes: Negative      Respiratory: Negative for cough, shortness of breath, wheezing and stridor  Cardiovascular: Negative for chest pain, palpitations and leg swelling  Gastrointestinal: Negative for abdominal distention, abdominal pain, blood in stool, diarrhea, nausea and vomiting  Endocrine: Negative  Genitourinary: Negative for dysuria, flank pain, frequency and urgency  Musculoskeletal: Negative  Right groin pain   Skin: Negative for rash and wound  Allergic/Immunologic: Negative  Neurological: Negative for dizziness, syncope, facial asymmetry, weakness, light-headedness, numbness and headaches  Hematological: Negative for adenopathy  Does not bruise/bleed easily  Psychiatric/Behavioral: Negative  Vitals:  Vitals:    18 1300 18 1315 18 1330 18 1345   BP: 124/57 127/55 108/54 121/55   Pulse: (!) 54 (!) 52 (!) 53 (!) 52   Resp: 20 16 16 15   Temp:       TempSrc:       SpO2: 98% 99% 96% 96%   Weight:       Height:         Temperature:   Temp (24hrs), Av 1 °F (36 7 °C), Min:98 1 °F (36 7 °C), Max:98 1 °F (36 7 °C)    Current: Temperature: 98 1 °F (36 7 °C)     Weights:   IBW: 47 8 kg  Body mass index is 35 95 kg/m²  Hemodynamic Monitoring:  N/A     Non-Invasive/Invasive Ventilation Settings:  Respiratory    Lab Data (Last 4 hours)    None         O2/Vent Data (Last 4 hours)    None              No results found for: PHART, LJS6YCE, PO2ART, YUA3FEW, W9NOIZJH, BEART, SOURCE  SpO2: SpO2: 98 %     Physical Exam:  Physical Exam   Constitutional: She is oriented to person, place, and time  She appears well-developed and well-nourished  She appears lethargic  No distress  HENT:   Head: Normocephalic and atraumatic  Eyes: Conjunctivae are normal  Pupils are equal, round, and reactive to light  Neck: Normal range of motion  Neck supple  No JVD present  No thyromegaly present  Cardiovascular: Regular rhythm, normal heart sounds and normal pulses  Bradycardia present  Exam reveals no gallop and no friction rub      No murmur heard   Pulmonary/Chest: Effort normal and breath sounds normal  No respiratory distress  Abdominal: Soft  Bowel sounds are normal  She exhibits no distension  There is no tenderness  Genitourinary:   Genitourinary Comments: Deferred   Musculoskeletal: She exhibits no edema  Lymphadenopathy:     She has no cervical adenopathy  Neurological: She is oriented to person, place, and time  She appears lethargic  No cranial nerve deficit  Skin: Skin is warm and dry  No rash noted  No erythema  There is pallor  Labs:    Results from last 7 days  Lab Units 09/07/18  1510   WBC Thousand/uL 9 36   HEMOGLOBIN g/dL 10 4*   HEMATOCRIT % 32 0*   PLATELETS Thousands/uL 154      Results from last 7 days  Lab Units 09/07/18  1510 09/07/18  0734   SODIUM mmol/L 143 144   POTASSIUM mmol/L 4 1 4 6   CHLORIDE mmol/L 110* 107   CO2 mmol/L 26 31   BUN mg/dL 36* 38*   CREATININE mg/dL 1 53* 1 72*   CALCIUM mg/dL 8 8 9 4                Results from last 7 days  Lab Units 09/07/18  1509 09/07/18  0734   INR  1 32* 1 20*           0  Lab Value Date/Time   TROPONINI <0 02 06/16/2018 1953   TROPONINI <0 02 05/07/2018 1032   TROPONINI <0 02 04/13/2018 2022   TROPONINI <0 02 03/22/2018 2023   TROPONINI <0 02 03/22/2018 1710   TROPONINI 0 11 (H) 12/11/2017 0320   TROPONINI 0 11 (H) 12/11/2017 0104   TROPONINI 0 08 (H) 12/10/2017 2123   TROPONINI <0 02 11/21/2017 1748   TROPONINI <0 02 11/21/2017 1605        Imaging:  CTA of chest abdomen and pelvis pending Pending  EKG:  Sinus bradycardia- This was personally reviewed by myself  Micro:  Lab Results   Component Value Date    BLOODCX No Growth After 5 Days  12/10/2017    BLOODCX No Growth After 5 Days   12/10/2017    URINECX >100,000 cfu/ml Enterococcus faecalis (A) 03/22/2018    URINECX 40,000-49,000 cfu/ml Stenotrophomonas maltophilia (A) 03/22/2018    URINECX >100,000 cfu/ml Citrobacter freundii (A) 12/10/2017    URINECX 50,000-59,000 cfu/ml Providencia rettgeri (A) 12/10/2017       Assessment:   1  Acute blood loss anemia secondary to right groin hematoma  2  Hypovolemic shock secondary to 1   3   Status post arteriogram and SFA angioplasty  4  Hypertension  5  Chronic diastolic congestive heart failure  6  CAD with history of stents  7  COPD  8  Dementia  9  Paroxysmal AFib with chronic anticoagulation  10  CKD        Plan:                  Neuro:   Dementia-delirium precautions, frequent reorientation,  help regulate sleep wake cycle  [facility] ICU  Sleep hygiene                 CV:   Hypovolemic shock secondary to acute blood loss anemia-improved with fluid administration, continue to trend hemoglobin, continue hemodynamic monitoring  CAD with history of stents-continue aspirin, hold Plavix for now in light of acute bleeding  Chronic diastolic congestive heart failure-does not appear to have acute exacerbation currently-will hold Lasix in light of dye loads received today  AFib on chronic anticoagulation-did have some mild bradycardia with the acute groin hematoma, that has improved, will monitor her rhythm closely and continue her home medications  Will hold anticoagulation currently  Lung:   COPD-not in acute exacerbation currently-continue inhalers and p r n  bronchodilator                 GI:   NPO until patient able to sit up in safely take p o  FEN:   Monitor lytes and replace as needed                 :   Chronic kidney disease-baseline creatinine appears to be 1 4-1 7 range, postprocedure creatinine was 1 5  Will continue to monitor very closely in light of her chronic kidney disease, absence of right kidney, and significant dye load that she received today for for the arteriogram and then secondarily for the CTA to evaluate for retroperitoneal bleed and active extravasation  Her Lasix will be held and we will continue with gentle IV hydration                   ID:   Monitor fever curve and WBC                  Heme:   Acute blood loss anemia secondary to right groin hematoma-will follow with serial hemoglobins and transfuse as needed  Hold anticoagulation                 Endo: Will order Accu-Cheks and sliding scale insulin                 Msk/Skin:   Frequent turning and repositioning  Fall precautions                 Disposition:   Will admit the patient to step-down unit  The patient will be a full code  VTE Pharmacologic Prophylaxis: Reason for no pharmacologic prophylaxis Acute blood loss anemia  VTE Mechanical Prophylaxis: sequential compression device     Invasive lines and devices: Invasive Devices     Peripheral Intravenous Line            Peripheral IV 09/07/18 Left Hand less than 1 day          Drain            Urethral Catheter Double-lumen 20 Fr  -- days                 Code Status: Level 1 - Full Code  POA: Yes  POLST:       Given critical illness, patient length of stay will require greater than two midnights  Counseling / Coordination of Care  Total Critical Care time spent 33 minutes excluding procedures, teaching and family updates  Portions of the record may have been created with voice recognition software  Occasional wrong word or "sound a like" substitutions may have occurred due to the inherent limitations of voice recognition software  Read the chart carefully and recognize, using context, where substitutions have occurred          Krissy Hernandez

## 2018-09-08 ENCOUNTER — APPOINTMENT (INPATIENT)
Dept: ULTRASOUND IMAGING | Facility: HOSPITAL | Age: 73
DRG: 252 | End: 2018-09-08
Payer: MEDICARE

## 2018-09-08 LAB
ANION GAP SERPL CALCULATED.3IONS-SCNC: 6 MMOL/L (ref 4–13)
BASOPHILS # BLD AUTO: 0.01 THOUSANDS/ΜL (ref 0–0.1)
BASOPHILS NFR BLD AUTO: 0 % (ref 0–1)
BUN SERPL-MCNC: 35 MG/DL (ref 5–25)
CA-I BLD-SCNC: 1.21 MMOL/L (ref 1.12–1.32)
CALCIUM SERPL-MCNC: 9.4 MG/DL (ref 8.3–10.1)
CHLORIDE SERPL-SCNC: 112 MMOL/L (ref 100–108)
CO2 SERPL-SCNC: 28 MMOL/L (ref 21–32)
CREAT SERPL-MCNC: 1.57 MG/DL (ref 0.6–1.3)
EOSINOPHIL # BLD AUTO: 0 THOUSAND/ΜL (ref 0–0.61)
EOSINOPHIL NFR BLD AUTO: 0 % (ref 0–6)
ERYTHROCYTE [DISTWIDTH] IN BLOOD BY AUTOMATED COUNT: 13.7 % (ref 11.6–15.1)
GFR SERPL CREATININE-BSD FRML MDRD: 33 ML/MIN/1.73SQ M
GLUCOSE P FAST SERPL-MCNC: 130 MG/DL (ref 65–99)
GLUCOSE SERPL-MCNC: 100 MG/DL (ref 65–140)
GLUCOSE SERPL-MCNC: 130 MG/DL (ref 65–140)
GLUCOSE SERPL-MCNC: 134 MG/DL (ref 65–140)
GLUCOSE SERPL-MCNC: 138 MG/DL (ref 65–140)
GLUCOSE SERPL-MCNC: 98 MG/DL (ref 65–140)
HCT VFR BLD AUTO: 29.2 % (ref 34.8–46.1)
HCT VFR BLD AUTO: 29.3 % (ref 34.8–46.1)
HCT VFR BLD AUTO: 30 % (ref 34.8–46.1)
HCT VFR BLD AUTO: 31.5 % (ref 34.8–46.1)
HGB BLD-MCNC: 10 G/DL (ref 11.5–15.4)
HGB BLD-MCNC: 9.2 G/DL (ref 11.5–15.4)
HGB BLD-MCNC: 9.5 G/DL (ref 11.5–15.4)
HGB BLD-MCNC: 9.5 G/DL (ref 11.5–15.4)
IMM GRANULOCYTES # BLD AUTO: 0.07 THOUSAND/UL (ref 0–0.2)
IMM GRANULOCYTES NFR BLD AUTO: 1 % (ref 0–2)
LYMPHOCYTES # BLD AUTO: 1.84 THOUSANDS/ΜL (ref 0.6–4.47)
LYMPHOCYTES NFR BLD AUTO: 14 % (ref 14–44)
MAGNESIUM SERPL-MCNC: 2.5 MG/DL (ref 1.6–2.6)
MCH RBC QN AUTO: 34 PG (ref 26.8–34.3)
MCHC RBC AUTO-ENTMCNC: 31.7 G/DL (ref 31.4–37.4)
MCV RBC AUTO: 107 FL (ref 82–98)
MONOCYTES # BLD AUTO: 1.06 THOUSAND/ΜL (ref 0.17–1.22)
MONOCYTES NFR BLD AUTO: 8 % (ref 4–12)
NEUTROPHILS # BLD AUTO: 9.84 THOUSANDS/ΜL (ref 1.85–7.62)
NEUTS SEG NFR BLD AUTO: 77 % (ref 43–75)
NRBC BLD AUTO-RTO: 0 /100 WBCS
PLATELET # BLD AUTO: 153 THOUSANDS/UL (ref 149–390)
PMV BLD AUTO: 12.3 FL (ref 8.9–12.7)
POTASSIUM SERPL-SCNC: 4 MMOL/L (ref 3.5–5.3)
RBC # BLD AUTO: 2.94 MILLION/UL (ref 3.81–5.12)
SODIUM SERPL-SCNC: 146 MMOL/L (ref 136–145)
WBC # BLD AUTO: 12.82 THOUSAND/UL (ref 4.31–10.16)

## 2018-09-08 PROCEDURE — 93926 LOWER EXTREMITY STUDY: CPT

## 2018-09-08 PROCEDURE — 82948 REAGENT STRIP/BLOOD GLUCOSE: CPT

## 2018-09-08 PROCEDURE — 85025 COMPLETE CBC W/AUTO DIFF WBC: CPT | Performed by: NURSE PRACTITIONER

## 2018-09-08 PROCEDURE — 85018 HEMOGLOBIN: CPT | Performed by: NURSE PRACTITIONER

## 2018-09-08 PROCEDURE — 83735 ASSAY OF MAGNESIUM: CPT | Performed by: NURSE PRACTITIONER

## 2018-09-08 PROCEDURE — 99222 1ST HOSP IP/OBS MODERATE 55: CPT | Performed by: INTERNAL MEDICINE

## 2018-09-08 PROCEDURE — 94760 N-INVAS EAR/PLS OXIMETRY 1: CPT

## 2018-09-08 PROCEDURE — 94660 CPAP INITIATION&MGMT: CPT

## 2018-09-08 PROCEDURE — 82330 ASSAY OF CALCIUM: CPT | Performed by: NURSE PRACTITIONER

## 2018-09-08 PROCEDURE — 85014 HEMATOCRIT: CPT | Performed by: NURSE PRACTITIONER

## 2018-09-08 PROCEDURE — 80048 BASIC METABOLIC PNL TOTAL CA: CPT | Performed by: NURSE PRACTITIONER

## 2018-09-08 RX ORDER — CLOPIDOGREL BISULFATE 75 MG/1
75 TABLET ORAL DAILY
Status: DISCONTINUED | OUTPATIENT
Start: 2018-09-09 | End: 2018-09-09 | Stop reason: HOSPADM

## 2018-09-08 RX ORDER — WARFARIN SODIUM 3 MG/1
3 TABLET ORAL
Status: DISCONTINUED | OUTPATIENT
Start: 2018-09-08 | End: 2018-09-09 | Stop reason: HOSPADM

## 2018-09-08 RX ADMIN — METOPROLOL TARTRATE 50 MG: 50 TABLET ORAL at 22:21

## 2018-09-08 RX ADMIN — POTASSIUM CHLORIDE 20 MEQ: 1500 TABLET, EXTENDED RELEASE ORAL at 09:44

## 2018-09-08 RX ADMIN — BUPROPION HYDROCHLORIDE 150 MG: 150 TABLET, FILM COATED, EXTENDED RELEASE ORAL at 09:51

## 2018-09-08 RX ADMIN — CARBAMAZEPINE 100 MG: 100 TABLET, CHEWABLE ORAL at 22:21

## 2018-09-08 RX ADMIN — FERROUS SULFATE TAB 325 MG (65 MG ELEMENTAL FE) 325 MG: 325 (65 FE) TAB at 09:43

## 2018-09-08 RX ADMIN — DILTIAZEM HYDROCHLORIDE 180 MG: 90 CAPSULE, EXTENDED RELEASE ORAL at 09:54

## 2018-09-08 RX ADMIN — LEVOTHYROXINE SODIUM 88 MCG: 88 TABLET ORAL at 05:25

## 2018-09-08 RX ADMIN — WARFARIN SODIUM 3 MG: 3 TABLET ORAL at 17:17

## 2018-09-08 RX ADMIN — ALLOPURINOL 300 MG: 300 TABLET ORAL at 09:50

## 2018-09-08 RX ADMIN — DILTIAZEM HYDROCHLORIDE 180 MG: 90 CAPSULE, EXTENDED RELEASE ORAL at 22:22

## 2018-09-08 RX ADMIN — METOPROLOL TARTRATE 50 MG: 50 TABLET ORAL at 09:43

## 2018-09-08 RX ADMIN — FERROUS SULFATE TAB 325 MG (65 MG ELEMENTAL FE) 325 MG: 325 (65 FE) TAB at 17:17

## 2018-09-08 RX ADMIN — ATORVASTATIN CALCIUM 10 MG: 10 TABLET, FILM COATED ORAL at 09:42

## 2018-09-08 RX ADMIN — CARBAMAZEPINE 100 MG: 100 TABLET, CHEWABLE ORAL at 16:57

## 2018-09-08 RX ADMIN — FLUTICASONE FUROATE AND VILANTEROL TRIFENATATE 1 PUFF: 100; 25 POWDER RESPIRATORY (INHALATION) at 09:57

## 2018-09-08 RX ADMIN — CARBAMAZEPINE 100 MG: 100 TABLET, CHEWABLE ORAL at 09:52

## 2018-09-08 RX ADMIN — ASPIRIN 81 MG: 81 TABLET, COATED ORAL at 09:43

## 2018-09-08 RX ADMIN — DIVALPROEX SODIUM 250 MG: 250 TABLET, FILM COATED, EXTENDED RELEASE ORAL at 09:48

## 2018-09-08 RX ADMIN — SODIUM CHLORIDE 75 ML/HR: 0.9 INJECTION, SOLUTION INTRAVENOUS at 05:26

## 2018-09-08 NOTE — CONSULTS
Consultation - Cardiology Team One  Mustapha Mail 71 y o  female MRN: [id number]  Unit/Bed#:  Encounter: [id number]    Inpatient consult to Cardiology  Consult performed by: Willis Fong  Consult ordered by: Rios Bond          Physician Requesting Consult: Tali Murillo MD  Reason for Consult / Principal Problem: Hematoma    HPI: Cardiologist Dr Felton Abdirahman is a 71y o  year old female who has a history of CAD S/P PCI, peripheral artery disease, hypertension, hyperlipidemia, CKD, A Fib/Flutter presenting for outpatient lower extremity angiogram   Was initially seen by Dr Holly Hart in office on 08/24/2018 with complaints of intermittent claudication  Had bilateral claudication with minimal exertion, right leg worse than left  No ulceration or gangrene  Had cardiac lower extremity angiography done  No stents placed  Was loaded with Plavix  Had balloon angioplasty  Developed a hematoma afterwards  Pressure held for 30+minutes to stop bleeding from site, fem stop applied  Patient was admitted to stepdown  Was hypotensive and bradycardic, now resolved    Today, Hb 10 0  Pseudoaneurysm study pending  REVIEW OF SYSTEMS:  Constitutional:  Denies fever or chills   Eyes:  Denies change in visual acuity   HENT:  Denies nasal congestion or sore throat   Respiratory:  Denies cough or shortness of breath   Cardiovascular:  Denies chest pain or edema   GI:  Denies abdominal pain, nausea, vomiting, bloody stools or diarrhea   :  +groin pain and ecchymosis   Denies dysuria, frequency, difficulty in micturition and nocturia  Musculoskeletal:  Denies back pain or joint pain   Neurologic:  Denies headache, focal weakness or sensory changes   Endocrine:  Denies polyuria or polydipsia   Lymphatic:  Denies swollen glands   Psychiatric:  Denies depression or anxiety     Historical Information   Past Medical History:   Diagnosis Date    Atrial fibrillation ([facility] [address] )     Brain aneurysm     CAD (coronary artery disease)     Cardiac disease     had stents 12 years ago [place] [id number] [place] CHF (congestive heart failure) (HCC)     CKD (chronic kidney disease)     COPD (chronic obstructive pulmonary disease) (HCC)     Dementia     Dementia     Disease of thyroid gland     GERD (gastroesophageal reflux disease)     Hyperlipidemia     Hyperlipidemia     Hypertension     [facility]ocardial infarction) [facility] [address] [facility]     Migraine     Renal disorder     Seizures ([facility] [address] )     Stroke ([facility] )      Past Surgical History:   Procedure Laterality Date    APPENDECTOMY      BLADDER TUMOR EXCISION      BRAIN SURGERY      1998  clip right frontal lobe    CARDIAC SURGERY      COLONOSCOPY      CORONARY STENT PLACEMENT      5 stents    EYE SURGERY      MANDIBLE FRACTURE SURGERY      TONSILLECTOMY      TUBAL LIGATION      UTERINE FIBROID SURGERY       History   Alcohol Use No     History   Drug Use No     History   Smoking Status    Former Smoker    Quit date: 9/7/2007   Smokeless Tobacco    Never Used       Family History:   Family History   Problem Relation Age of Onset    Heart disease Father     Parkinsonism Father     Macular degeneration Mother     Glaucoma Mother     Diabetes Brother     Heart disease Brother        MEDS & ALLERGIES:  all current active meds have been reviewed and current meds: Current Facility-Administered Medications   Medication Dose Route Frequency    acetaminophen (TYLENOL) tablet 488 mg  488 mg Oral Q6H PRN    albuterol (PROVENTIL HFA,VENTOLIN HFA) inhaler 2 puff  2 puff Inhalation Q6H PRN    albuterol inhalation solution 2 5 mg  2 5 mg Nebulization Q6H PRN    allopurinol (ZYLOPRIM) tablet 300 mg  300 mg Oral QAM    aspirin (ECOTRIN LOW STRENGTH) EC tablet 81 mg  81 mg Oral Daily    atorvastatin (LIPITOR) tablet 10 mg  10 mg Oral Daily    buPROPion (WELLBUTRIN SR) 12 hr tablet 150 mg  150 mg Oral Daily    carBAMazepine (TEGretol) chewable tablet 100 mg  100 mg Oral TID  diltiazem (CARDIZEM SR) 12 hr capsule 180 mg  180 mg Oral Q12H [address]    divalproex sodium (DEPAKOTE ER) 24 hr tablet 250 mg  250 mg Oral Daily    ferrous sulfate tablet 325 mg  325 mg Oral BID    fluticasone-vilanterol (BREO ELLIPTA) 100-25 mcg/inh inhaler 1 puff  1 puff Inhalation Daily    insulin lispro (HumaLOG) 100 units/mL subcutaneous injection 1-5 Units  1-5 Units Subcutaneous TID AC    levothyroxine tablet 88 mcg  88 mcg Oral Early Morning    metoprolol tartrate (LOPRESSOR) tablet 50 mg  50 mg Oral Q12H [address]    morphine 4 mg/mL injection    Code/Trauma/Sedation Med    morphine injection 2 mg  2 mg Intravenous Q4H PRN    nitroglycerin (NITROSTAT) SL tablet 0 4 mg  0 4 mg Sublingual Q5 Min PRN    potassium chloride (K-DUR,KLOR-CON) CR tablet 20 mEq  20 mEq Oral Daily    sodium chloride 0 9 % infusion  75 mL/hr Intravenous Continuous       sodium chloride 75 mL/hr Last Rate: 75 mL/hr (09/08/18 0526)     Allergies   Allergen Reactions    Spiriva Handihaler [Tiotropium Bromide Monohydrate] Swelling    Ramipril Facial Swelling    Penicillins      unsure       OBJECTIVE:  Vitals:   Vitals:    09/08/18 0700   BP: 157/65   Pulse: (!) 53   Resp: 15   Temp: 98 °F (36 7 °C)   SpO2: 100%     Body mass index is 36 03 kg/m²      Systolic (56IRN), DELFINA:363 , Min:108 , JNX:194     Diastolic (76BNA), NKP:44, Min:54, Max:117      Intake/Output Summary (Last 24 hours) at 09/08/18 0937  Last data filed at 09/08/18 0600   Gross per 24 hour   Intake          1011 25 ml   Output             1665 ml   Net          -653 75 ml     Weight (last 2 days)     Date/Time   Weight    09/08/18 0531  86 5 (190 7)    09/07/18 0707  86 3 (190 26)            Invasive Devices     Peripheral Intravenous Line            Peripheral IV 09/07/18 Left Hand 1 day    Peripheral IV 09/07/18 Left Forearm less than 1 day          Drain            Urethral Catheter Double-lumen 20 Fr  -- days    Urethral Catheter less than 1 day PHYSICAL EXAMS:  General:  Patient is not in acute distress, laying in the bed comfortably, awake, alert responding to commands  Head: Normocephalic, Atraumatic  HEENT: White sclera, pink conjunctiva,  PERRLA,pharynx benign  Neck:  Supple, no neck vein distention, carotids+2/+2 no bruits, thyromegaly, adenopathy  Respiratory: clear to P/A  Cardiovascular:  PMI normal, S1-S2 normal, No  Murmurs, thrills, gallops, rubs   Regular rhythm  GI: Abdomen soft nontender  No hepatosplenomegaly, adenopathy, ascites,or rebound tenderness  Extremities: No edema, normal pulses, no calf tenderness, no joint deformities, no venous disease   Integument:  No skin rashes or ulceration  Lymphatic:  No cervical or inguinal lymphadenopathy  Neurologic:  Patient is awake alert, responding to command, well-oriented to time and place and person moving all extremities  : +groin pain, Ecchymosis/ tenderness   +resolving hematoma    LABORATORY RESULTS:      CBC with diff:   Results from last 7 days  Lab Units 09/08/18  0504 09/08/18  0159 09/07/18  1756 09/07/18  1510   WBC Thousand/uL 12 82*  --   --  9 36   HEMOGLOBIN g/dL 10 0* 9 5* 10 2* 10 4*   HEMATOCRIT % 31 5* 29 3* 32 0* 32 0*   MCV fL 107*  --   --  103*   PLATELETS Thousands/uL 153  --   --  154   MCH pg 34 0  --   --  33 5   MCHC g/dL 31 7  --   --  32 5   RDW % 13 7  --   --  13 3   MPV fL 12 3  --   --  12 2   NRBC AUTO /100 WBCs 0  --   --   --        CMP:  Results from last 7 days  Lab Units 09/08/18  0504 09/07/18  1510 09/07/18  0734   SODIUM mmol/L 146* 143 144   POTASSIUM mmol/L 4 0 4 1 4 6   CHLORIDE mmol/L 112* 110* 107   CO2 mmol/L 28 26 31   BUN mg/dL 35* 36* 38*   CREATININE mg/dL 1 57* 1 53* 1 72*   CALCIUM mg/dL 9 4 8 8 9 4   EGFR ml/min/1 73sq m 33 34 29       BMP:  Results from last 7 days  Lab Units 09/08/18  0504 09/07/18  1510 09/07/18  0734   SODIUM mmol/L 146* 143 144   POTASSIUM mmol/L 4 0 4 1 4 6   CHLORIDE mmol/L 112* 110* 107   CO2 mmol/L 28 26 31   BUN mg/dL 35* 36* 38*   CREATININE mg/dL 1 57* 1 53* 1 72*   CALCIUM mg/dL 9 4 8 8 9 4              Results from last 7 days  Lab Units 18  0504   MAGNESIUM mg/dL 2 5               Results from last 7 days  Lab Units 18  1509 18  0734   INR  1 32* 1 20*       Lipid Profile:   No results found for: CHOL  No results found for: HDL  No results found for: LDLCALC  No results found for: TRIG    Cardiac testing:   Results for orders placed during the hospital encounter of 12/10/17   Echo complete with contrast if indicated    Narrative [address] [place] [id number]  [phone]    Transthoracic Echocardiogram  2D, M-mode, Doppler, and Color Doppler    Study date:  11-Dec-2017    Patient: Lorenzo Majano  MR number: [id number]  Account number: [de-identified]  : 1945  Age: 71 years  Gender: Female  Status: Inpatient  Location: Bedside  Height: 60 in  Weight: 192 lb  BP: 99/ 50 mmHg    Indications: Shortness of breath  Diagnoses: R06 00 - Dyspnea, unspecified, R06 02 - Shortness of breath    Sonographer:  Eilene Polo  Interpreting Physician:  Raz Foster MD  Referring Physician:  Alycia Pryor PA-C  Group:  [facility]    SUMMARY    LEFT VENTRICLE:  Systolic function was normal  Ejection fraction was estimated in the range of 55 % to 65 %  There were no regional wall motion abnormalities  There was mild concentric hypertrophy  Doppler parameters were consistent with abnormal left ventricular relaxation (grade 1 diastolic dysfunction)  MITRAL VALVE:  There was mild annular calcification  There was mild regurgitation  AORTIC VALVE:  There was mild regurgitation  TRICUSPID VALVE:  There was mild regurgitation  PULMONIC VALVE:  There was mild regurgitation  HISTORY: PRIOR HISTORY: Elevated trponin, Dementia, Seizures, Stroke, Myocardial infarction  Congestive heart failure   Risk factors: hypertension and hypercholesterolemia  Chronic lung disease  PROCEDURE: The procedure was performed at the bedside  This was a routine study  The transthoracic approach was used  The study included complete 2D imaging, M-mode, complete spectral Doppler, and color Doppler  The heart rate was 68 bpm,  at the start of the study  Images were obtained from the parasternal, apical, subcostal, and suprasternal notch acoustic windows  Echocardiographic views were limited due to poor acoustic window availability, decreased penetration, and  lung interference  This was a technically difficult study  LEFT VENTRICLE: Size was normal  Systolic function was normal  Ejection fraction was estimated in the range of 55 % to 65 %  There were no regional wall motion abnormalities  Wall thickness was normal  There was mild concentric  hypertrophy  DOPPLER: Doppler parameters were consistent with abnormal left ventricular relaxation (grade 1 diastolic dysfunction)  RIGHT VENTRICLE: The size was normal  Systolic function was normal  Wall thickness was normal     LEFT ATRIUM: Size was normal     RIGHT ATRIUM: Size was normal     MITRAL VALVE: There was mild annular calcification  Valve structure was normal  There was normal leaflet separation  DOPPLER: The transmitral velocity was within the normal range  There was no evidence for stenosis  There was mild  regurgitation  AORTIC VALVE: The valve was trileaflet  Leaflets exhibited normal thickness and normal cuspal separation  DOPPLER: Transaortic velocity was within the normal range  There was no evidence for stenosis  There was mild regurgitation  TRICUSPID VALVE: The valve structure was normal  There was normal leaflet separation  DOPPLER: The transtricuspid velocity was within the normal range  There was no evidence for stenosis  There was mild regurgitation  PULMONIC VALVE: Leaflets exhibited normal thickness, no calcification, and normal cuspal separation   DOPPLER: The transpulmonic velocity was within the normal range  There was mild regurgitation  PERICARDIUM: There was no pericardial effusion  The pericardium was normal in appearance  AORTA: The root exhibited normal size  SYSTEM MEASUREMENT TABLES    2D  %FS: 29 2 %  Ao Diam: 3 1 cm  EDV(Teich): 140 8 ml  EF(Teich): 55 6 %  ESV(Teich): 62 5 ml  IVSd: 1 1 cm  LA Area: 20 9 cm2  LA Diam: 3 7 cm  LVEDV MOD A4C: 117 1 ml  LVEF MOD A4C: 50 3 %  LVESV MOD A4C: 58 2 ml  LVIDd: 5 4 cm  LVIDs: 3 8 cm  LVLd A4C: 8 5 cm  LVLs A4C: 7 1 cm  LVPWd: 1 1 cm  RA Area: 17 5 cm2  RVIDd: 3 2 cm  SV MOD A4C: 59 ml  SV(Teich): 78 3 ml    CW  AR Dec [place]: 1 9 m/s2  AR Dec Time: 2208 1 ms  AR PHT: 640 4 ms  AR Vmax: 4 1 m/s  AR maxP 4 mmHg  TR Vmax: 2 5 m/s  TR maxP mmHg    MM  TAPSE: 1 4 cm    PW  AVC: 389 7 ms  E': 0 1 m/s  E/E': 15 3  MV A Anoop: 1 m/s  MV Dec [place]yo: 3 4 m/s2  MV DecT: 240 3 ms  MV E Anoop: 0 8 m/s  MV E/A Ratio: 0 8  MV PHT: 69 7 ms  MVA By PHT: 3 2 cm2    [employer] Accredited Echocardiography Laboratory    Prepared and electronically signed by    Reese Dickerson MD  Signed 11-Dec-2017 12:09:00         Imaging:   I have personally reviewed pertinent reports  Assessment/Plan:  1  Right groin hematoma:  -hematoma appears to be resolving  -hemoglobin 10 0   -pseudoaneurysm study pending   -aspirin restarted    2  PAD, claudication:  Had balloon angioplasty, no stents placed  Procedure complicated by hematoma as above  3   Chronic diastolic Congestive heart failure:  Currently euvolemic, last EF 55-65%  Continue Lasix  Low-salt diet, daily weights  4   Atrial fibrillation/flutter:  Continue Cardizem and Lopressor for rate control  Coumadin held given hematoma  5   Hypertension:  Stable, continue present regimen  6   Hyperlipidemia:  Continue statin  Code Status: Level 1 - Full Code    Counseling / Coordination of Care  Total floor / unit time spent today 35 minutes  Greater than 50% of total time was spent with the patient and / or family counseling and / or coordination of care  A description of the counseling / coordination of care: Review of history, current assessment, development of a plan      Sharlene Unger PA-C  9/8/2018,9:37 AM

## 2018-09-08 NOTE — PROGRESS NOTES
Progress Note - Critical Care   Kirby Connolly 71 y o  female MRN: [id number]  Unit/Bed#: -01 Encounter: [id number]    Assessment:   1  Acute blood loss anemia secondary to right groin hematoma  2  Hypovolemic shock secondary to 1   3   Status post arteriogram and SFA angioplasty  4  Hypertension  5  Chronic diastolic congestive heart failure  6  CAD with history of stents  7  COPD  8  Dementia  9  Paroxysmal AFib with chronic anticoagulation  10  CKD    Plan:      Neuro:   -patient has chronic dementia  -continue with delirium precautions  -sleep hygiene   CV:   -hypotension resolved with fluid resuscitation   -hemodynamics have been stable overnight   -ASA/plavix and coumadin on hold given acute blood loss anemia    Will check US for pseudoaneurysm and restart today if negative   -continue statin  -continue diltiazem/BB for rate control   -cardiology following, appreciate recommendations    Lung:   -pulmonary hygiene  -incentive spirometry if patient will participate  -continue home dose breo  -patient not in exacerbation    GI:   -start diet at dysphagia and have speech therapy evaluate patient, there was concern for difficulty swallowing   FEN:   -monitor electrolytes and replete electrolytes    :   -monitor urine output    ID:   -monitor temperature curve and WBCs   Heme:   -hgb remain stable s/p femstop and source control, hemodynamics improved  -US pending for evaluation of pseudoaneurysm, if negative will restart anticoagulation    Endo:   -monitor glucose via bmp    Msk/Skin:   -turn and reposition while in bed    Disposition:   -transfer to MS/tele    ______________________________________________________________________    HPI/24hr events: admitted yesterday for acute blood loss anemia, hbg stable overnight along with hemodynamics    ______________________________________________________________________    Physical Exam:   PHYSICAL EXAM  General :   Well developed, well nourished, age appropriate affect, behavior, orientation, thought content, thought processes, judgement, and insight  Articulate  English primary language  Neuro:   GCS= 14  CN 2-12 intact  Non Focal  HEENT:  Normocephalic, atraumatic, Pupils 3 brisk bilat  PERRLA, EOMI, hearing grossly intact  Symmetrical facial expressions without droop or slurred speech  Tongue midline without fasiculations  Neck:  No JVD, FROM, No masses/adenopathy  Back:   Symmetrical, atruamatic, spinous process pain free on palpation  Cardiovascular:   No heaves,lifts,thrills  S1/S2 [email or web address] No noted MRGC  Pulmonary:   Symmetrical expansion of chest  Resp even & unlabored  GI :   Abd SNT + BSX4 quads  No palp/pulsitile masses  :  N/A  Musculoskeletal:  Symmetrical  Ecchymosis to the Right femoral puncture site, soft  FROM in UE & LE  Muscle strength 4/5  No lymphadenopathy  Extrem warm to touch  DTR grossly intact  Brachial/radial/femoral/popliteal/pedal/posterior tibial pulses +2  No lesions noted  CN 2-12 grossly intact  No rhomberg  Sensation and motor function intact   ______________________________________________________________________  Vitals:    18 0600 18 0700 18 0943 18 1030   BP: 142/63 157/65 (!) 171/83 138/61   BP Location:  Left arm     Pulse: (!) 54 (!) 53 58 (!) 53   Resp: 19 15  16   Temp:  98 °F (36 7 °C)  98 7 °F (37 1 °C)   TempSrc:  Oral  Oral   SpO2: 99% 100%  97%   Weight:       Height:         Arterial Line BP: 121/55  Arterial Line MAP (mmHg): 80 mmHg     Temperature:   Temp (24hrs), Av 2 °F (36 8 °C), Min:97 8 °F (36 6 °C), Max:98 7 °F (37 1 °C)    Current Temperature: 98 7 °F (37 1 °C)  Weights:   IBW: 47 8 kg    Body mass index is 36 03 kg/m²    Weight (last 2 days)     Date/Time   Weight    18 0531  86 5 (190 7)    18 0707  86 3 (190 26)            Hemodynamic Monitoring:  N/A     Non-Invasive/Invasive Ventilation Settings:  Respiratory    Lab Data (Last 4 hours)    None O2/Vent Data (Last 4 hours)    None              No results found for: PHART, VJS4YQV, PO2ART, QAZ5DSP, Y4PWTUBW, BEART, SOURCE  SpO2: SpO2: 97 %  Intake and Outputs:  I/O       09/06 0701 - 09/07 0700 09/07 0701 - 09/08 0700 09/08 0701 - 09/09 0700    I V  (mL/kg)  1011 3 (11 7) 367 5 (4 2)    Total Intake(mL/kg)  1011 3 (11 7) 367 5 (4 2)    Urine (mL/kg/hr)  1665 450 (0 8)    Total Output   1665 450    Net   -653 8 -82 5               Nutrition:        Diet Orders            Start     Ordered    09/07/18 1707  Diet NPO  Diet effective now     Question Answer Comment   Diet Type NPO    RD to adjust diet per protocol? No        09/07/18 1711        Labs:     Results from last 7 days  Lab Units 09/08/18  1206 09/08/18  0504 09/08/18  0159  09/07/18  1510   WBC Thousand/uL  --  12 82*  --   --  9 36   HEMOGLOBIN g/dL 9 5* 10 0* 9 5*  < > 10 4*   HEMATOCRIT % 30 0* 31 5* 29 3*  < > 32 0*   PLATELETS Thousands/uL  --  153  --   --  154   NEUTROS PCT %  --  77*  --   --   --    MONOS PCT %  --  8  --   --   --    < > = values in this interval not displayed     Results from last 7 days  Lab Units 09/08/18  0504 09/07/18  1510 09/07/18  0734   SODIUM mmol/L 146* 143 144   POTASSIUM mmol/L 4 0 4 1 4 6   CHLORIDE mmol/L 112* 110* 107   CO2 mmol/L 28 26 31   BUN mg/dL 35* 36* 38*   CREATININE mg/dL 1 57* 1 53* 1 72*   CALCIUM mg/dL 9 4 8 8 9 4       Results from last 7 days  Lab Units 09/08/18  0504   MAGNESIUM mg/dL 2 5            Results from last 7 days  Lab Units 09/07/18  1509 09/07/18  0734   INR  1 32* 1 20*           0  Lab Value Date/Time   TROPONINI <0 02 06/16/2018 1953   TROPONINI <0 02 05/07/2018 1032   TROPONINI <0 02 04/13/2018 2022   TROPONINI <0 02 03/22/2018 2023   TROPONINI <0 02 03/22/2018 1710   TROPONINI 0 11 (H) 12/11/2017 0320   TROPONINI 0 11 (H) 12/11/2017 0104   TROPONINI 0 08 (H) 12/10/2017 2123   TROPONINI <0 02 11/21/2017 1748   TROPONINI <0 02 11/21/2017 1605     EKG: NSR   Micro:  Lab Results Component Value Date    BLOODCX No Growth After 5 Days  12/10/2017    BLOODCX No Growth After 5 Days  12/10/2017    URINECX >100,000 cfu/ml Enterococcus faecalis (A) 03/22/2018    URINECX 40,000-49,000 cfu/ml Stenotrophomonas maltophilia (A) 03/22/2018    URINECX >100,000 cfu/ml Citrobacter freundii (A) 12/10/2017    URINECX 50,000-59,000 cfu/ml Providencia rettgeri (A) 12/10/2017     Allergies:    Allergies   Allergen Reactions    Spiriva Handihaler [Tiotropium Bromide Monohydrate] Swelling    Ramipril Facial Swelling    Penicillins      unsure     Medications:   Scheduled Meds:  Current Facility-Administered Medications:  acetaminophen 488 mg Oral Q6H PRN Klaudia Lala, DO    albuterol 2 puff Inhalation Q6H PRN Klaudia Lala, DO    albuterol 2 5 mg Nebulization Q6H PRN Klaudia Lala, DO    allopurinol 300 mg Oral QAM Klaudia Lala, DO    aspirin 81 mg Oral Daily Klaudia Lala, DO    atorvastatin 10 mg Oral Daily Klaudia Lala, DO    buPROPion 150 mg Oral Daily Klaudia Lala, DO    carBAMazepine 100 mg Oral TID Klaudia Lala, DO    diltiazem 180 mg Oral Q12H [address] Klaudia Lala, DO    divalproex sodium 250 mg Oral Daily Klaudia Lala, DO    ferrous sulfate 325 mg Oral BID Klaudia Lala, DO    fluticasone-vilanterol 1 puff Inhalation Daily Klaudia Lala, DO    [facility] Hold] insulin lispro 1-5 Units Subcutaneous TID AC TANVI Dozier    levothyroxine 88 mcg Oral Early Morning Klaudia Lala, DO    metoprolol tartrate 50 mg Oral Q12H [address] Klaudia Beckwithner, DO    morphine   Code/Trauma/Sedation Med Klaudia Lala, DO    morphine injection 2 mg Intravenous Q4H PRN Amy Perez PA-C    nitroglycerin 0 4 mg Sublingual Q5 Min PRN Klaudia Spike, DO    potassium chloride 20 mEq Oral Daily Klaudia Spike, DO    sodium chloride 75 mL/hr Intravenous Continuous Soni Almendarez MD Last Rate: 75 mL/hr (09/08/18 0526)     Continuous Infusions:  sodium chloride 75 mL/hr Last Rate: 75 mL/hr (09/08/18 0526)     PRN Meds:    acetaminophen 488 mg Q6H PRN   albuterol 2 puff Q6H PRN   albuterol 2 5 mg Q6H PRN   morphine  Code/Trauma/Sedation Med   morphine injection 2 mg Q4H PRN   nitroglycerin 0 4 mg Q5 Min PRN     VTE Pharmacologic Prophylaxis: Sequential compression device (Venodyne)  and Reason for no pharmacologic prophylaxis acute blood loss anemia   VTE Mechanical Prophylaxis: sequential compression device  Invasive lines and devices: Invasive Devices     Peripheral Intravenous Line            Peripheral IV 09/07/18 Left Hand 1 day    Peripheral IV 09/07/18 Left Forearm less than 1 day          Drain            Urethral Catheter Double-lumen 20 Fr  -- days    Urethral Catheter less than 1 day              Counseling / Coordination of Care  Total Critical Care time spent 35 minutes excluding procedures, teaching and family updates  Code Status: Level 1 - Full Code    Portions of the record may have been created with voice recognition software  Occasional wrong word or "sound a like" substitutions may have occurred due to the inherent limitations of voice recognition software  Read the chart carefully and recognize, using context, where substitutions have occurred      TANVI Marcos

## 2018-09-08 NOTE — PROGRESS NOTES
Progress Note - ICU Transfer to Step down/med  surg  Lillian Garcia 71 y o  female MRN: [id number]    Unit/Bed#: -01 Encounter: [id number]    Code Status: Level 1 - Full Code  POA: Yes  POLST:      Reason for ICU adm: ***    Active problems:   Patient Active Problem List   Diagnosis    HTN (hypertension)    COPD (chronic obstructive pulmonary disease) ([facility] )    Acute on chronic respiratory failure (HCC)    Atrial flutter (HCC)    Back pain    Dementia    HLD (hyperlipidemia)    UTI (urinary tract infection)    Seizure disorder ([facility] )    RSV infection    Ambulatory dysfunction    Hypernatremia    Stage 3 chronic kidney disease    Bilateral leg edema    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    JESENIA (obstructive sleep apnea)    Intermittent claudication of both lower extremities due to atherosclerosis (HCC)    Atrial fibrillation (HCC)    SOB (shortness of breath)    Chronic indwelling Davis catheter    Bradycardia    Chronic hypoxemic respiratory failure ([place] [address] )       Consultants: ***    History of Present Illness/Summary of clinical course: ***     Recent or scheduled procedures: ***    Outstanding/pending diagnostics: ***       Mobilization Plan: ***    Nutrition Plan: ***    Discharge Plan: ***   Patient should be ready for discharge to *** on *** after ***   Initial PT/OT/ST Recommendations: ***   Initial /Plan: ***     Specific Diagnosis Plan:    Sepsis: Source: ***, Antibiotics: ***, Length:  ***    Need for Infectious Disease consult: ***  Heart Failure:  Cardiology consult placed  Diuresis plan: ***  Hyperglycemia:  Converting from IV to subcutaneous insulin: ***    Need for Endocrine consult: ***    Spoke with *** at ***:*** regarding transfer  Portions of the record may have been created with voice recognition software    Occasional wrong word or "sound a like" substitutions may have occurred due to the inherent limitations of voice recognition software  Read the chart carefully and recognize, using context, where substitutions have occurred

## 2018-09-09 VITALS
SYSTOLIC BLOOD PRESSURE: 115 MMHG | HEIGHT: 61 IN | HEART RATE: 48 BPM | TEMPERATURE: 98.7 F | DIASTOLIC BLOOD PRESSURE: 59 MMHG | BODY MASS INDEX: 36.21 KG/M2 | WEIGHT: 191.8 LBS | RESPIRATION RATE: 18 BRPM | OXYGEN SATURATION: 97 %

## 2018-09-09 PROBLEM — T14.8XXA HEMATOMA: Status: ACTIVE | Noted: 2018-09-09

## 2018-09-09 PROBLEM — I10 ESSENTIAL HYPERTENSION: Status: ACTIVE | Noted: 2018-09-09

## 2018-09-09 PROBLEM — I73.9 PERIPHERAL ARTERY DISEASE (HCC): Status: ACTIVE | Noted: 2018-09-09

## 2018-09-09 LAB
ANION GAP SERPL CALCULATED.3IONS-SCNC: 4 MMOL/L (ref 4–13)
BASOPHILS # BLD AUTO: 0.04 THOUSANDS/ΜL (ref 0–0.1)
BASOPHILS NFR BLD AUTO: 1 % (ref 0–1)
BUN SERPL-MCNC: 33 MG/DL (ref 5–25)
CALCIUM SERPL-MCNC: 8.6 MG/DL (ref 8.3–10.1)
CHLORIDE SERPL-SCNC: 112 MMOL/L (ref 100–108)
CO2 SERPL-SCNC: 30 MMOL/L (ref 21–32)
CREAT SERPL-MCNC: 1.46 MG/DL (ref 0.6–1.3)
EOSINOPHIL # BLD AUTO: 0.17 THOUSAND/ΜL (ref 0–0.61)
EOSINOPHIL NFR BLD AUTO: 2 % (ref 0–6)
ERYTHROCYTE [DISTWIDTH] IN BLOOD BY AUTOMATED COUNT: 13.8 % (ref 11.6–15.1)
GFR SERPL CREATININE-BSD FRML MDRD: 36 ML/MIN/1.73SQ M
GLUCOSE SERPL-MCNC: 106 MG/DL (ref 65–140)
GLUCOSE SERPL-MCNC: 131 MG/DL (ref 65–140)
GLUCOSE SERPL-MCNC: 169 MG/DL (ref 65–140)
GLUCOSE SERPL-MCNC: 198 MG/DL (ref 65–140)
HCT VFR BLD AUTO: 25.8 % (ref 34.8–46.1)
HCT VFR BLD AUTO: 26.8 % (ref 34.8–46.1)
HCT VFR BLD AUTO: 29.2 % (ref 34.8–46.1)
HGB BLD-MCNC: 8 G/DL (ref 11.5–15.4)
HGB BLD-MCNC: 8.4 G/DL (ref 11.5–15.4)
HGB BLD-MCNC: 9 G/DL (ref 11.5–15.4)
IMM GRANULOCYTES # BLD AUTO: 0.06 THOUSAND/UL (ref 0–0.2)
IMM GRANULOCYTES NFR BLD AUTO: 1 % (ref 0–2)
INR PPP: 1.19 (ref 0.86–1.17)
LYMPHOCYTES # BLD AUTO: 2.17 THOUSANDS/ΜL (ref 0.6–4.47)
LYMPHOCYTES NFR BLD AUTO: 28 % (ref 14–44)
MAGNESIUM SERPL-MCNC: 2.1 MG/DL (ref 1.6–2.6)
MCH RBC QN AUTO: 34.1 PG (ref 26.8–34.3)
MCHC RBC AUTO-ENTMCNC: 31.3 G/DL (ref 31.4–37.4)
MCV RBC AUTO: 109 FL (ref 82–98)
MONOCYTES # BLD AUTO: 0.66 THOUSAND/ΜL (ref 0.17–1.22)
MONOCYTES NFR BLD AUTO: 8 % (ref 4–12)
NEUTROPHILS # BLD AUTO: 4.77 THOUSANDS/ΜL (ref 1.85–7.62)
NEUTS SEG NFR BLD AUTO: 60 % (ref 43–75)
NRBC BLD AUTO-RTO: 0 /100 WBCS
PLATELET # BLD AUTO: 118 THOUSANDS/UL (ref 149–390)
PMV BLD AUTO: 12.7 FL (ref 8.9–12.7)
POTASSIUM SERPL-SCNC: 4.3 MMOL/L (ref 3.5–5.3)
PROTHROMBIN TIME: 15 SECONDS (ref 11.8–14.2)
RBC # BLD AUTO: 2.46 MILLION/UL (ref 3.81–5.12)
SODIUM SERPL-SCNC: 146 MMOL/L (ref 136–145)
WBC # BLD AUTO: 7.87 THOUSAND/UL (ref 4.31–10.16)

## 2018-09-09 PROCEDURE — G8996 SWALLOW CURRENT STATUS: HCPCS

## 2018-09-09 PROCEDURE — G8978 MOBILITY CURRENT STATUS: HCPCS

## 2018-09-09 PROCEDURE — 92610 EVALUATE SWALLOWING FUNCTION: CPT

## 2018-09-09 PROCEDURE — 97163 PT EVAL HIGH COMPLEX 45 MIN: CPT

## 2018-09-09 PROCEDURE — G8997 SWALLOW GOAL STATUS: HCPCS

## 2018-09-09 PROCEDURE — 85610 PROTHROMBIN TIME: CPT | Performed by: NURSE PRACTITIONER

## 2018-09-09 PROCEDURE — 85025 COMPLETE CBC W/AUTO DIFF WBC: CPT | Performed by: NURSE PRACTITIONER

## 2018-09-09 PROCEDURE — G8979 MOBILITY GOAL STATUS: HCPCS

## 2018-09-09 PROCEDURE — 85018 HEMOGLOBIN: CPT | Performed by: NURSE PRACTITIONER

## 2018-09-09 PROCEDURE — 85014 HEMATOCRIT: CPT | Performed by: NURSE PRACTITIONER

## 2018-09-09 PROCEDURE — 80048 BASIC METABOLIC PNL TOTAL CA: CPT | Performed by: NURSE PRACTITIONER

## 2018-09-09 PROCEDURE — 99232 SBSQ HOSP IP/OBS MODERATE 35: CPT | Performed by: INTERNAL MEDICINE

## 2018-09-09 PROCEDURE — 99238 HOSP IP/OBS DSCHRG MGMT 30/<: CPT | Performed by: NURSE PRACTITIONER

## 2018-09-09 PROCEDURE — 82948 REAGENT STRIP/BLOOD GLUCOSE: CPT

## 2018-09-09 PROCEDURE — G8998 SWALLOW D/C STATUS: HCPCS

## 2018-09-09 PROCEDURE — 83735 ASSAY OF MAGNESIUM: CPT | Performed by: NURSE PRACTITIONER

## 2018-09-09 PROCEDURE — 93925 LOWER EXTREMITY STUDY: CPT | Performed by: SURGERY

## 2018-09-09 RX ORDER — CLOPIDOGREL BISULFATE 75 MG/1
75 TABLET ORAL DAILY
Qty: 90 TABLET | Refills: 0 | Status: ON HOLD | OUTPATIENT
Start: 2018-09-09 | End: 2018-12-14

## 2018-09-09 RX ADMIN — METOPROLOL TARTRATE 50 MG: 50 TABLET ORAL at 09:21

## 2018-09-09 RX ADMIN — FERROUS SULFATE TAB 325 MG (65 MG ELEMENTAL FE) 325 MG: 325 (65 FE) TAB at 09:17

## 2018-09-09 RX ADMIN — POTASSIUM CHLORIDE 20 MEQ: 1500 TABLET, EXTENDED RELEASE ORAL at 09:17

## 2018-09-09 RX ADMIN — FLUTICASONE FUROATE AND VILANTEROL TRIFENATATE 1 PUFF: 100; 25 POWDER RESPIRATORY (INHALATION) at 09:21

## 2018-09-09 RX ADMIN — CLOPIDOGREL BISULFATE 75 MG: 75 TABLET ORAL at 09:17

## 2018-09-09 RX ADMIN — ALLOPURINOL 300 MG: 300 TABLET ORAL at 09:17

## 2018-09-09 RX ADMIN — DILTIAZEM HYDROCHLORIDE 180 MG: 90 CAPSULE, EXTENDED RELEASE ORAL at 09:21

## 2018-09-09 RX ADMIN — BUPROPION HYDROCHLORIDE 150 MG: 150 TABLET, FILM COATED, EXTENDED RELEASE ORAL at 11:41

## 2018-09-09 RX ADMIN — CARBAMAZEPINE 100 MG: 100 TABLET, CHEWABLE ORAL at 09:16

## 2018-09-09 RX ADMIN — ASPIRIN 81 MG: 81 TABLET, COATED ORAL at 09:16

## 2018-09-09 RX ADMIN — LEVOTHYROXINE SODIUM 88 MCG: 88 TABLET ORAL at 06:05

## 2018-09-09 RX ADMIN — DIVALPROEX SODIUM 250 MG: 250 TABLET, FILM COATED, EXTENDED RELEASE ORAL at 09:20

## 2018-09-09 RX ADMIN — INSULIN LISPRO 1 UNITS: 100 INJECTION, SOLUTION INTRAVENOUS; SUBCUTANEOUS at 09:22

## 2018-09-09 RX ADMIN — ATORVASTATIN CALCIUM 10 MG: 10 TABLET, FILM COATED ORAL at 09:16

## 2018-09-09 RX ADMIN — INSULIN LISPRO 1 UNITS: 100 INJECTION, SOLUTION INTRAVENOUS; SUBCUTANEOUS at 12:58

## 2018-09-09 NOTE — SPEECH THERAPY NOTE
Speech-Language Pathology Bedside Swallow Evaluation        Patient Name: Quinn CHRISTINE Date: 9/9/2018     Problem List  Patient Active Problem List   Diagnosis    HTN (hypertension)    [facility]) [facility]    Acute on chronic respiratory failure (HCC)    Atrial flutter (HCC)    Back pain    Dementia    Hyperlipidemia    UTI (urinary tract infection)    Seizure disorder ([facility] )    RSV infection    Ambulatory dysfunction    Hypernatremia    Stage 3 chronic kidney disease    Bilateral leg edema    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    JESENIA (obstructive sleep apnea)    Intermittent claudication of both lower extremities due to atherosclerosis ([facility] )    Atrial fibrillation (HCC)    SOB (shortness of breath)    Chronic indwelling Davis catheter    Bradycardia    Chronic hypoxemic respiratory failure ([facility] )    Essential hypertension    Peripheral artery disease ([facility] )    Hematoma       Past Medical History  Past Medical History:   Diagnosis Date    Atrial fibrillation ([facility] )     Brain aneurysm     CAD (coronary artery disease)     Cardiac disease     had stents 12 years ago in [facility]    CHF (congestive heart failure) ([facility] )     CKD (chronic kidney disease)     COPD (chronic obstructive pulmonary disease) (HCC)     Dementia     Dementia     Disease of thyroid gland     GERD (gastroesophageal reflux disease)     Hyperlipidemia     Hyperlipidemia     Hypertension     MI (myocardial infarction) ([facility] )     Migraine     Renal disorder     Seizures ([facility] )     Stroke [facility])        Past Surgical History  Past Surgical History:   Procedure Laterality Date    APPENDECTOMY      BLADDER TUMOR EXCISION      BRAIN SURGERY      1998  clip right frontal lobe    CARDIAC SURGERY      COLONOSCOPY      CORONARY STENT PLACEMENT      5 stents    EYE SURGERY      MANDIBLE FRACTURE SURGERY      TONSILLECTOMY      TUBAL LIGATION      UTERINE FIBROID SURGERY           Current Medical Status  Pt is a 71 y o  female who presented to [facility]  for outpatient lower extremity angiogram   Was initially seen by Dr Verna Pineda in office on 08/24/2018 with complaints of intermittent claudication   Had bilateral claudication with minimal exertion, right leg worse than left   No ulceration or gangrene   Had cardiac lower extremity angiography done   No stents placed  Was loaded with Plavix  Had balloon angioplasty  Developed a hematoma afterwards  Pressure held for 30+minutes to stop bleeding from site, fem stop applied  Patient was admitted to stepdown  Was hypotensive and bradycardic, now resolved  A ST eval was ordered to further assess swallow function due to concern for dysphagia  Past medical history:   Please see H&P for details    Special Studies:  9/7 CTA chest/abdomen/pelvis: 1  Right anterior abdominal wall contusion and hematoma is extending over the right groin and thigh  No evidence of acute fracture  2   No acute intra-abdominal or pelvic trauma  3   No evidence of acute chest trauma  No thoracic or abdominal aortic aneurysm or dissection  4   Cholelithiasis without evidence of cholecystitis      Social/Education/Vocational Hx:  Pt lives in assisted living facility    Swallow Information   Current Risks for Dysphagia & Aspiration: confusion + edentulous lower     Current Symptoms/Concerns: none reported    Current Diet: puree/level 1 diet and nectar thick liquids      Baseline Diet: unknown    Baseline Assessment   Behavior/Cognition: alert    Speech/Language Status: able to participate in conversation and able to follow commands    Patient Positioning: upright in chair      Swallow Mechanism Exam   Facial: symmetrical  Labial: WFL  Lingual: WFL  Velum: symmetrical  Mandible: adequate ROM  Dentition: upper dentures and edentulous lower  Vocal quality:clear/adequate   Volitional Cough: strong/productive   Respiratory: 2L O2 via NC  Swallow Mechanics: WFL upon dry swallow     Consistencies Assessed and Performance   Consistencies Administered: thin liquids, nectar thick, puree, soft solids, hard solids and mixed consistency  Specific materials administered included: applesauce (4oz), peaches in juice, raisin bran in thin milk- liquids were administered via single and consecutive cup and straw sips    Oral Stage: minimal  Mastication was mildly prolonged and incomplete with regular solids- improved to adequate when softened with milk- mastication was adequate with the remaining materials administered today  Bolus formation and transfer were functional with no significant oral residue noted  No overt s/s reduced oral control  Pharyngeal Stage: [facility]  Swallowing initiation appeared prompt  Laryngeal rise was palpated and judged to be within functional limits  No coughing, throat clearing, change in vocal quality or respiratory status noted today  Esophageal Concerns: none reported     Summary   Pts oropharyngeal swallow function appears generally WFL at this time with the materials administered today  She does not appear to be at risk for aspiration however given prolonged and decreased mastication due to lack of lower dentition she would likely benefit from dysph 3 diet       Recommendations: soft/level 3 diet and thin liquids     Recommended Form of Meds: as per patient/MD     Aspiration precautions and compensatory swallowing strategies: upright posture, only feed when fully alert, slow rate of feeding and small bites/sips    Results Reviewed with: patient, RN, CRNP and and CNA     Treatment Recommendations: [facility]  will follow up should patient remain in the hospital-  however she is pending discharge today  Recommend follow up ST in the outpatient setting or with homecare to follow up upon discharge    Dysphagia Goals: pt will tolerate dysph 3 textures  with thin liquids without s/s of aspiration x3    LISA Clay , [id number] [facility]  Speech Language Pathologist   [id number]

## 2018-09-09 NOTE — PLAN OF CARE
Problem: PHYSICAL THERAPY ADULT  Goal: Performs mobility at highest level of function for planned discharge setting  See evaluation for individualized goals  Treatment/Interventions: Functional transfer training, Elevations, LE strengthening/ROM, Therapeutic exercise, Endurance training, Patient/family training, Bed mobility, Gait training, Spoke to nursing          See flowsheet documentation for full assessment, interventions and recommendations  Prognosis: Good  Problem List: Decreased strength, Decreased endurance, Impaired balance, Decreased mobility, Decreased cognition, Decreased safety awareness, Decreased skin integrity, Pain  Assessment: pt is a 71y/o f who presents to [facility] c a-flutter  s/p outpt arteriogram, however post op developed hematoma prompting admission  PMH significant for COPD, dementia, seizure disorder, JESENIA, CKD III, + CVA  currently on 1:1 observation 2* impulsivity + impaired cognitive status  at baseline, pt ambulates c RW  resides at [facility] c 10 steps to access bedroom  currently requires min-mod (A)x1-2 for functional mobility tasks 2* deficits in strength, balance, gait quality, cognition, + activity tolerance noted in PT exam above  Barthel Index 45/100  min verbal cues required for safe technique c transitions  ambulated 5' + 80' c RW + seated rest btwn trials 2* fatigue  would benefit from skilled PT to maximize functional mobility + return to [facility]  pt would benefit from 1st floor set up at half-way 2* above mobility impairments + deconditioning  also for increased safety 2* pt's impaired cognitive status  upon d/c, recommend pt return to [facility] c PT at next level of care, however if half-way unable to accommodate pt's needs, recommend STR  PT eval of high complexity 2* unstable med status c pt requiring ongoing medical management 2* a-flutter + hematoma s/p arteriogram  pt remains on 1:1 observation 2* impaired safety awareness + impulsivity   presents c decline in mobility from baseline requiring min (A)x1 for safety c mobility tasks  pt also c significant deconditioning ambulating limited distance of 80'  needs to negotiate 10 stairs to access bedroom at senior living  Barriers to Discharge: Inaccessible home environment     Recommendation: Short-term skilled PT, Home PT (pending senior living's ability to accommodate pt's needs)     PT - OK to Discharge: Yes    See flowsheet documentation for full assessment

## 2018-09-09 NOTE — SOCIAL WORK
Karis-Nurse at [address] left a message to inform Aristides Lagos from [facility] personal care home will visit patient today at 1PM to do an assessment prior to accepting patient back at [facility]   notified

## 2018-09-09 NOTE — PHYSICAL THERAPY NOTE
PT Evaluation (22min)  (9:12-9:34)    Past Medical History:   Diagnosis Date    Atrial fibrillation ([facility] )     Brain aneurysm     CAD (coronary artery disease)     Cardiac disease     had stents 12 years ago in [address] [place] [address] CH[facility]congestive heart failure) ([facility]     CKD (chronic kidney disease)     COPD (chronic obstructive pulmonary disease) (HCC)     Dementia     Dementia     Disease of thyroid gland     GERD (gastroesophageal reflux disease)     Hyperlipidemia     Hyperlipidemia     Hypertension     MI (myocardial infarction) ([facility] )     Migraine     Renal disorder     Seizures ([facility] )     Stroke ([place] [id number] )         09/09/18 0934   Note Type   Note type Eval only   Pain Assessment   Pain Assessment No/denies pain   Home Living   Type of Home Assisted living  ([facility])   Home Layout Two level;Bed/bath upstairs  (10 steps to access bedroom on 2nd floor)   Home Equipment Walker;Cane   Prior Function   Level of [place] Needs assistance with ADLs and functional mobility  (ambulates c RW)   Receives Help From Personal care attendant  ([facility] staff prn)   ADL Assistance Needs assistance   IADLs Needs assistance   Falls in the last 6 months 1 to 4   Vocational Retired   Restrictions/Precautions   Other Precautions Cognitive; Chair Alarm; Bed Alarm;1:1;O2;Fall Risk;Multiple lines   General   Additional Pertinent History pt presents to [facility] c a-flutter  s/p arteriogram as outpt + developed hematoma promting admission  pt currently on 1:1 observation 2* impulsivity + impaired cognitive status  PT consulted for mobility + d/c planning  cleared for OOB by ns staff     Family/Caregiver Present No   Cognition   Orientation Level Oriented to person   RUE Assessment   RUE Assessment WFL  (4-/5)   LUE Assessment   LUE Assessment WFL  (4-/5)   RLE Assessment   RLE Assessment WFL  (4/5)   LLE Assessment   LLE Assessment WFL  (4/5)   Coordination   Sensation WFL   Bed Mobility   Supine to Sit 3  Moderate assistance   Additional items Assist x 2;HOB elevated; Increased time required;Verbal cues;LE management; Bedrails   Transfers   Sit to Stand 4  Minimal assistance   Additional items Assist x 1;Verbal cues; Increased time required;Armrests   Stand to Sit 4  Minimal assistance   Additional items Assist x 1;Verbal cues; Increased time required;Armrests   Ambulation/Elevation   Gait pattern Narrow JENNYFER; Decreased foot clearance; Forward Flexion; Short stride   Gait Assistance 4  Minimal assist   Additional items Assist x 1;Verbal cues   Assistive Device Rolling walker   Distance 5' + 80' c seated rest btwn trials   Balance   Static Sitting Fair +   Dynamic Sitting Fair +   Static Standing Fair -   Dynamic Standing Poor +   Ambulatory Poor +   Activity Tolerance   Activity Tolerance Patient limited by fatigue;Patient limited by pain   [facility]   Assessment   Prognosis Good   Problem List Decreased strength;Decreased endurance; Impaired balance;Decreased mobility; Decreased cognition;Decreased safety awareness;Decreased skin integrity;Pain   Assessment pt is a 71y/o f who presents to [facility] c a-flutter  s/p outpt arteriogram, however post op developed hematoma prompting admission  PMH significant for COPD, dementia, seizure disorder, JESENIA, CKD III, + CVA  currently on 1:1 observation 2* impulsivity + impaired cognitive status  at baseline, pt ambulates c RW  resides at [facility] c 10 steps to access bedroom  currently requires min-mod (A)x1-2 for functional mobility tasks 2* deficits in strength, balance, gait quality, cognition, + activity tolerance noted in PT exam above  Barthel Index 45/100  min verbal cues required for safe technique c transitions  ambulated 5' + 80' c RW + seated rest btwn trials 2* fatigue  would benefit from skilled PT to maximize functional mobility + return to [facility]  pt would benefit from 1st floor set up at [facility] 2* above mobility impairments + deconditioning   also for increased safety 2* pt's impaired cognitive status  upon d/c, recommend pt return to [facility] c PT at next level of care, however if [facility] unable to accommodate pt's needs, recommend STR  PT eval of high complexity 2* unstable med status c pt requiring ongoing medical management 2* a-flutter + hematoma s/p arteriogram  pt remains on 1:1 observation 2* impaired safety awareness + impulsivity  presents c decline in mobility from baseline requiring min (A)x1 for safety c mobility tasks  pt also c significant deconditioning ambulating limited distance of 80'  needs to negotiate 10 stairs to access bedroom at custodial  Barriers to Discharge Inaccessible home environment   Goals   Patient Goals "I want to get back to [place]"  [facility] Expiration Date 09/19/18   Short Term Goal #1 1  increase strength 1/2 grade to improve overall functional mobility, 2  perform bed mobility c (S) to decrease caregiver burden, 3  safely perform transfers c (S) to perform ADLs, 4  ambulate >150' c (S) + RW to safely return to custodial, 5  negotiate 10 stairs c (S) to safely access bedroom   Plan   Treatment/Interventions Functional transfer training;Elevations;LE strengthening/ROM; Therapeutic exercise; Endurance training;Patient/family training;Bed mobility;Gait training;Spoke to nursing   PT Frequency 5x/wk   Recommendation   Recommendation Short-term skilled PT; Home PT  (pending custodial's ability to accommodate pt's needs)   PT - OK to Discharge Yes  (to custodial c PT or SNF pending custodial's ability to accommodate pt)   Barthel Index   Feeding 10   Bathing 0   Grooming Score 0   Dressing Score 5   Bladder Score 0   Bowels Score 10   Toilet Use Score 5   Transfers (Bed/Chair) Score 5   Mobility (Level Surface) Score 10   Stairs Score 0   Barthel Index Score 45     Mera Distance, PT

## 2018-09-09 NOTE — ASSESSMENT & PLAN NOTE
· With claudication  · Underwent balloon angioplasty no stent placed was intended for outpatient procedure however developed significant hematoma  · One night ICU stay hematoma stable  · Cleared for discharge resume Coumadin

## 2018-09-09 NOTE — SOCIAL WORK
CM phoned patient's daughter-lisa on her home and cell number  This CM awaits call back  This CM learned this patient resides at [facility] [phone]  This CM spoke to [facility] from [place] who confirmed patient does live in their facility  [place] notified this CM patient's daughter Lilia Veras transports patient to [place]  Patient is cleared for discharge via card and SLIM  This CM awaits call from dtr to transport patient back to [facility]  Nurse and SLIM notified

## 2018-09-09 NOTE — ASSESSMENT & PLAN NOTE
· Right groin hematoma  · Appears to be resolving  · Hemoglobin stable at 9  · Aspirin and Coumadin restarted  · Pseudoaneurysm study pending

## 2018-09-09 NOTE — PLAN OF CARE
Problem: SLP ADULT - SWALLOWING, IMPAIRED  Goal: Initial SLP swallow eval performed  Outcome: Completed Date Met: 09/09/18

## 2018-09-09 NOTE — PROGRESS NOTES
General Cardiology   Progress Note   Voncille Livers 71 y o  female MRN: [id number]  Unit/Bed#: -01 Encounter: [id number]      SUBJECTIVE:   No significant events overnight  I am feeling good  I want to go home  REVIEW OF SYSTEMS:  Constitutional:  Denies fever or chills   Eyes:  Denies change in visual acuity   HENT:  Denies nasal congestion or sore throat   Respiratory:  Denies cough or shortness of breath   Cardiovascular:  Denies chest pain or edema   GI:  Denies abdominal pain, nausea, vomiting, bloody stools or diarrhea   :  Denies dysuria, frequency, difficulty in micturition and nocturia  Musculoskeletal:  Denies back pain or joint pain   Neurologic:  Denies headache, focal weakness or sensory changes   Endocrine:  Denies polyuria or polydipsia   Lymphatic:  Denies swollen glands   Psychiatric:  Denies depression or anxiety     OBJECTIVE:   Vitals:  Vitals:    09/09/18 1056   BP: 115/59   Pulse: (!) 48   Resp: 18   Temp: 98 7 °F (37 1 °C)   SpO2: 97%     Body mass index is 36 24 kg/m²  Systolic (35PKQ), HTA:167 , Min:114 , JSU:934     Diastolic (74CUB), TDO:48, Min:54, Max:70      Intake/Output Summary (Last 24 hours) at 09/09/18 1151  Last data filed at 09/09/18 0501   Gross per 24 hour   Intake          1061 25 ml   Output             1275 ml   Net          -213 75 ml     Weight (last 2 days)     Date/Time   Weight    09/09/18 0600  87 (191 8)    09/08/18 0531  86 5 (190 7)    09/07/18 0707  86 3 (190 26)                  PHYSICAL EXAMS:  General:  Patient is not in acute distress, laying in the bed comfortably, awake, alert responding to commands  Head: Normocephalic, Atraumatic  HEENT:  Both pupils normal-size atraumatic, normocephalic, nonicteric  Neck:  JVP not raised  Trachea central  Respiratory:  Bronchovascular breathing all over the chest without any accompaniment  Cardiovascular:  + right groin hematoma; Improving    S1-S2 normal   Regular rate and rhythm  GI:  Abdomen soft nontender  Liver and spleen normal size  Lymphatic:  No cervical or inguinal lymphadenopathy  Neurologic:  Patient is awake alert, responding to command, well-oriented to time and place and person moving     LABORATORY RESULTS:        CBC with diff:   Results from last 7 days  Lab Units 09/09/18  0455 09/09/18  0007 09/08/18  1731  09/08/18  0504  09/07/18  1510   WBC Thousand/uL 7 87  --   --   --  12 82*  --  9 36   HEMOGLOBIN g/dL 8 4* 8 0* 9 2*  < > 10 0*  < > 10 4*   HEMATOCRIT % 26 8* 25 8* 29 2*  < > 31 5*  < > 32 0*   MCV fL 109*  --   --   --  107*  --  103*   PLATELETS Thousands/uL 118*  --   --   --  153  --  154   MCH pg 34 1  --   --   --  34 0  --  33 5   MCHC g/dL 31 3*  --   --   --  31 7  --  32 5   RDW % 13 8  --   --   --  13 7  --  13 3   MPV fL 12 7  --   --   --  12 3  --  12 2   NRBC AUTO /100 WBCs 0  --   --   --  0  --   --    < > = values in this interval not displayed      CMP:  Results from last 7 days  Lab Units 09/09/18  0455 09/08/18  0504 09/07/18  1510   SODIUM mmol/L 146* 146* 143   POTASSIUM mmol/L 4 3 4 0 4 1   CHLORIDE mmol/L 112* 112* 110*   CO2 mmol/L 30 28 26   BUN mg/dL 33* 35* 36*   CREATININE mg/dL 1 46* 1 57* 1 53*   CALCIUM mg/dL 8 6 9 4 8 8   EGFR ml/min/1 73sq m 36 33 34       BMP:  Results from last 7 days  Lab Units 09/09/18  0455 09/08/18  0504 09/07/18  1510   SODIUM mmol/L 146* 146* 143   POTASSIUM mmol/L 4 3 4 0 4 1   CHLORIDE mmol/L 112* 112* 110*   CO2 mmol/L 30 28 26   BUN mg/dL 33* 35* 36*   CREATININE mg/dL 1 46* 1 57* 1 53*   CALCIUM mg/dL 8 6 9 4 8 8              Results from last 7 days  Lab Units 09/09/18  0455 09/08/18  0504   MAGNESIUM mg/dL 2 1 2 5               Results from last 7 days  Lab Units 09/07/18  1509 09/07/18  0734   INR  1 32* 1 20*       Lipid Profile:   No results found for: CHOL  No results found for: HDL  No results found for: LDLCALC  No results found for: TRIG    Cardiac testing:  Results for orders placed during the hospital encounter of 12/10/17   Echo complete with contrast if indicated    Narrative [address] [place] [id number]  [phone]    Transthoracic Echocardiogram  2D, M-mode, Doppler, and Color Doppler    Study date:  11-Dec-2017    Patient: Genoveva Meigs  MR number: [id number]  Account number: [de-identified]  : 1945  Age: 71 years  Gender: Female  Status: Inpatient  Location: Bedside  Height: 60 in  Weight: 192 lb  BP: 99/ 50 mmHg    Indications: Shortness of breath  Diagnoses: R06 00 - Dyspnea, unspecified, R06 02 - Shortness of breath    Sonographer:  Eileen Polo  Interpreting Physician:  Evgeny Wei MD  Referring Physician:  Helio Alan PA-C  Group:  [facility]    SUMMARY    LEFT VENTRICLE:  Systolic function was normal  Ejection fraction was estimated in the range of 55 % to 65 %  There were no regional wall motion abnormalities  There was mild concentric hypertrophy  Doppler parameters were consistent with abnormal left ventricular relaxation (grade 1 diastolic dysfunction)  MITRAL VALVE:  There was mild annular calcification  There was mild regurgitation  AORTIC VALVE:  There was mild regurgitation  TRICUSPID VALVE:  There was mild regurgitation  PULMONIC VALVE:  There was mild regurgitation  HISTORY: PRIOR HISTORY: Elevated trponin, Dementia, Seizures, Stroke, Myocardial infarction  Congestive heart failure  Risk factors: hypertension and hypercholesterolemia  Chronic lung disease  PROCEDURE: The procedure was performed at the bedside  This was a routine study  The transthoracic approach was used  The study included complete 2D imaging, M-mode, complete spectral Doppler, and color Doppler  The heart rate was 68 bpm,  at the start of the study  Images were obtained from the parasternal, apical, subcostal, and suprasternal notch acoustic windows   Echocardiographic views were limited due to poor acoustic window availability, decreased penetration, and  lung interference  This was a technically difficult study  LEFT VENTRICLE: Size was normal  Systolic function was normal  Ejection fraction was estimated in the range of 55 % to 65 %  There were no regional wall motion abnormalities  Wall thickness was normal  There was mild concentric  hypertrophy  DOPPLER: Doppler parameters were consistent with abnormal left ventricular relaxation (grade 1 diastolic dysfunction)  RIGHT VENTRICLE: The size was normal  Systolic function was normal  Wall thickness was normal     LEFT ATRIUM: Size was normal     RIGHT ATRIUM: Size was normal     MITRAL VALVE: There was mild annular calcification  Valve structure was normal  There was normal leaflet separation  DOPPLER: The transmitral velocity was within the normal range  There was no evidence for stenosis  There was mild  regurgitation  AORTIC VALVE: The valve was trileaflet  Leaflets exhibited normal thickness and normal cuspal separation  DOPPLER: Transaortic velocity was within the normal range  There was no evidence for stenosis  There was mild regurgitation  TRICUSPID VALVE: The valve structure was normal  There was normal leaflet separation  DOPPLER: The transtricuspid velocity was within the normal range  There was no evidence for stenosis  There was mild regurgitation  PULMONIC VALVE: Leaflets exhibited normal thickness, no calcification, and normal cuspal separation  DOPPLER: The transpulmonic velocity was within the normal range  There was mild regurgitation  PERICARDIUM: There was no pericardial effusion  The pericardium was normal in appearance  AORTA: The root exhibited normal size      SYSTEM MEASUREMENT TABLES    2D  %FS: 29 2 %  Ao Diam: 3 1 cm  EDV(Teich): 140 8 ml  EF(Teich): 55 6 %  ESV(Teich): 62 5 ml  IVSd: 1 1 cm  LA Area: 20 9 cm2  LA Diam: 3 7 cm  LVEDV MOD A4C: 117 1 ml  LVEF MOD A4C: 50 3 %  LVESV MOD A4C: 58 2 ml  LVIDd: 5 4 cm  LVIDs: 3 8 cm  LVLd A4C: 8 5 cm  LVLs A4C: 7 1 cm  LVPWd: 1 1 cm  RA Area: 17 5 cm2  RVIDd: 3 2 cm  SV MOD A4C: 59 ml  SV(Teich): 78 3 ml    CW  AR Dec Cedar: 1 9 m/s2  AR Dec Time: 2208 1 ms  AR PHT: 640 4 ms  AR Vmax: 4 1 m/s  AR maxP 4 mmHg  TR Vmax: 2 5 m/s  TR maxP mmHg    MM  TAPSE: 1 4 cm    PW  AVC: 389 7 ms  E': 0 1 m/s  E/E': 15 3  MV A Anoop: 1 m/s  MV Dec Cedar: 3 4 m/s2  MV DecT: 240 3 ms  MV E Anoop: 0 8 m/s  MV E/A Ratio: 0 8  MV PHT: 69 7 ms  MVA By PHT: 3 2 cm2    [employer] Accredited Echocardiography Laboratory    Prepared and electronically signed by    Lincoln Gilbert MD  Signed 11-Dec-2017 12:09:00       No results found for this or any previous visit  No results found for this or any previous visit  No procedure found  No results found for this or any previous visit      Meds/Allergies   all current active meds have been reviewed  Prescriptions Prior to Admission   Medication    allopurinol (ZYLOPRIM) 300 mg tablet    aspirin (ECOTRIN LOW STRENGTH) 81 mg EC tablet    atorvastatin (LIPITOR) 10 mg tablet    buPROPion (WELLBUTRIN SR) 150 mg 12 hr tablet    carBAMazepine (TEGretol) 100 mg chewable tablet    Cholecalciferol (VITAMIN D) 2000 units CAPS    Citric Ic-Drqxeminyza-Bt Carb (RENACIDIN) SOLN    diltiazem (CARDIZEM SR) 90 mg 12 hr capsule    diphenhydrAMINE (BENADRYL) 25 mg tablet    divalproex sodium (DEPAKOTE ER) 250 mg 24 hr tablet    fenofibrate micronized (LOFIBRA) 67 MG capsule    ferrous sulfate 325 (65 Fe) mg tablet    fluticasone furoate-vilanterol (BREO ELLIPTA)    furosemide (LASIX) 40 mg tablet    Levothyroxine Sodium 88 MCG CAPS    metoprolol tartrate (LOPRESSOR) 50 mg tablet    nitroglycerin (NITROSTAT) 0 4 mg SL tablet    potassium chloride (K-DUR,KLOR-CON) 20 mEq tablet    Sennosides-Docusate Sodium (SENNA PLUS PO)    warfarin (COUMADIN) 3 mg tablet    acetaminophen (TYLENOL) 500 mg tablet    albuterol (2 5 mg/3 mL) 0 083 % nebulizer solution    albuterol (PROVENTIL HFA,VENTOLIN HFA) 90 mcg/act inhaler          ASSESSMENT & PLAN   1-  Right groin hematoma status post lower extremity angiogram   No pseudoaneurysm noted on ultrasound  Hemoglobin is stable  2-  PAD/ claudication status post balloon angioplasty  Continue all medications    3- chronic diastolic congestive heart failure, appears euvolemic  EF 55-65%  Continue all medications    4- paroxysmal atrial fibrillation,  Continue all medications  Restart Coumadin  5-  Hypertension, stable     patient is stable from a cardiac standpoint for discharge  For will follow up INR as an outpatient  Discussed with Delvin Dickens Internal Medicine Hospitalist PA [address] [place] [address],[address] [place], [place]  9/9/2018,11:51 AM    Portions of the record may have been created with voice recognition software   Occasional wrong word or "sound a like" substitutions may have occurred due to the inherent limitations of voice recognition software   Read the chart carefully and recognize, using context, where substitutions have occurred

## 2018-09-09 NOTE — SOCIAL WORK
This CM phoned Jorge at work phone  Alonso Stinson states she works [employer] [place] today she will transport patient at 2:30PM today to [facility]  Alonso Stinson declined this CM scheduling transport for patient  Alonso Stinson was informed of IMM and a copy is provided  IMM is in the chart  Nurse and SLIM notified

## 2018-09-09 NOTE — DISCHARGE SUMMARY
[facility]  Discharge- Monroe Root [birth year], 71 y o  female MRN: [id number]    Unit/Bed#: -[place] Encounter: [id number]    Primary Care Provider: Patti Green DO   Date and time admitted to hospital: 9/7/2018  3:40 PM        Hematoma   Assessment & Plan    · Right groin hematoma  · Appears to be resolving  · Hemoglobin stable at 9  · Aspirin and Coumadin restarted  · Pseudoaneurysm study pending        Essential hypertension   Assessment & Plan    · Stable  · continue home medications        Hyperlipidemia   Assessment & Plan    · Continue statin        Atrial flutter (HCC)   Assessment & Plan    · Continue Cardizem and Lopressor  · Coumadin        * Peripheral artery disease ([facility] [address] )   Assessment & Plan    · With claudication  · Underwent balloon angioplasty no stent placed was intended for outpatient procedure however developed significant hematoma  · One night ICU stay hematoma stable  · Cleared for discharge resume Coumadin                 Discharging Physician / Practitioner: Emil Everett  PCP: Patti Green DO  Admission Date:   Admission Orders     Ordered        09/07/18 1516  Inpatient Admission  Once             Discharge Date: 09/09/18    Disposition:      Other: Patient is returning to her long-term care facility of residence    For Discharges to [facility] SNF:   · Not Applicable to this Patient - Not Applicable to this Patient    Reason for Admission:  Patient was admitted for a procedure  Of angioplasty  Patient developed hematoma postprocedure and needed to spend the night in ICU for further monitoring of hematoma and hemoglobin as both are stable    Discharge Diagnoses:     Please see assessment and plan section above for further details regarding discharge diagnoses       Resolved Problems  Date Reviewed: 9/6/2018    None          Consultations During Hospital Stay:  · Cardiology    Procedures Performed:     · Cardiac lower extremity angiography Medication Adjustments and Discharge Medications:  · Summary of Medication Adjustments made as a result of this hospitalization:  None  · Medication Dosing Tapers - Please refer to Discharge Medication List for details on any medication dosing tapers (if applicable to patient)  · Medications being temporarily held (include recommended restart time):  None  · Discharge Medication List: See after visit summary for reconciled discharge medications  Wound Care Recommendations:  When applicable, please see wound care section of After Visit Summary  Diet Recommendations at Discharge:  Diet -        Diet Orders            Start     Ordered    09/08/18 1424  Diet Dysphagia/Modified Consistency; Dysphagia 1-Pureed; Nectar Thick Liquid  Diet effective now     Question Answer Comment   Diet Type Dysphagia/Modified Consistency    Dysphagia/Modified Consistency Dysphagia 1-Pureed    Liquid Modifier Nectar Thick Liquid    RD to adjust diet per protocol? No        09/08/18 1426          Instructions for any Catheters / Lines Present at Discharge (including removal date, if applicable):  None, patient came in with chronic Davis continue same regimen post discharge    Significant Findings / Test Results:     VAS pseudoaneurysm study   Final Result by Shahida Garvey MD (09/09 1148)      CTA chest abdomen pelvis w wo contrast   Final Result by Calixto Ray MD (09/07 0622)         1  Right anterior abdominal wall contusion and hematoma is extending over the right groin and thigh  No evidence of acute fracture  2   No acute intra-abdominal or pelvic trauma  3   No evidence of acute chest trauma  No thoracic or abdominal aortic aneurysm or dissection  4   Cholelithiasis without evidence of cholecystitis                    Workstation performed: [id number]           · Postop diagnosis includes peripheral vascular disease angioplasty was completed with drug-eluting balloon there is no residual stenosis post procedure    Incidental Findings:   · None     Test Results Pending at Discharge (will require follow up): · None     Outpatient Tests Requested:  · None    Complications:  Patient developed right groin hematoma post procedure    Hospital Course: Juan Cain is a 71 y o  female patient who originally presented to the hospital on 9/7/2018 due to peripheral artery disease she came in for an angioplasty and postprocedure developed hematoma  She has been tonight in the ICU for hematoma and hemoglobin monitoring hemoglobin remained stable hematoma appears to be resolving safe from surgeon and cardiology standpoint to restart aspirin and Coumadin  Patient safe for discharge    Condition at Discharge: fair     Discharge Day Visit / Exam:     Subjective:  Patient has history dementia so on able to get reliable interview  However she is excited to go home and cooperative and pleasant  Vitals: Blood Pressure: 115/59 (09/09/18 1056)  Pulse: (!) 48 (09/09/18 1056)  Temperature: 98 7 °F (37 1 °C) (09/09/18 1056)  Temp Source: Oral (09/09/18 1056)  Respirations: 18 (09/09/18 1056)  Height: 5' 1" (154 9 cm) (09/07/18 0707)  Weight - Scale: 87 kg (191 lb 12 8 oz) (09/09/18 0600)  SpO2: 97 % (09/09/18 1056)  Exam:   Physical Exam   Constitutional: She appears well-developed and well-nourished  Neck: Normal range of motion  Pulmonary/Chest: Effort normal and breath sounds normal    Abdominal: Soft  Bowel sounds are normal    Neurological: She is alert  Skin: Skin is warm and dry  Right groin hematoma with right groin site   Psychiatric: She has a normal mood and affect  Her behavior is normal    Nursing note and vitals reviewed        Discussion with Family:  And daughter aware is on her way to  patient to transport her back to [facility]    Goals of Care Discussions:  · Code Status at Discharge: Level 1 - Full Code  · Were there any Goals of Care Discussions during Hospitalization?: No  · Results of any General Goals of Care Discussions:  None   · POLST Completed: No   · If POLST Completed, Summary of POLST Agreement Provided Here:  None   · OK to Rehospitalize if Needed? Yes    Discharge instructions/Information to patient and family:   See after visit summary section titled Discharge Instructions for information provided to patient and family  Planned Readmission:  No      Discharge Statement:  I spent 30 minutes discharging the patient  This time was spent on the day of discharge  I had direct contact with the patient on the day of discharge  Greater than 50% of the total time was spent examining patient, answering all patient questions, arranging and discussing plan of care with patient as well as directly providing post-discharge instructions  Additional time then spent on discharge activities      ** Please Note: This note has been constructed using a voice recognition system **

## 2018-09-10 ENCOUNTER — TELEPHONE (OUTPATIENT)
Dept: CARDIOLOGY CLINIC | Facility: CLINIC | Age: 73
End: 2018-09-10

## 2018-09-10 LAB
ATRIAL RATE: 110 BPM
ATRIAL RATE: 54 BPM
P AXIS: 64 DEGREES
P AXIS: 72 DEGREES
PR INTERVAL: 136 MS
PR INTERVAL: 194 MS
QRS AXIS: -1 DEGREES
QRS AXIS: 0 DEGREES
QRSD INTERVAL: 106 MS
QRSD INTERVAL: 70 MS
QT INTERVAL: 318 MS
QT INTERVAL: 480 MS
QTC INTERVAL: 430 MS
QTC INTERVAL: 455 MS
T WAVE AXIS: 17 DEGREES
T WAVE AXIS: 20 DEGREES
VENTRICULAR RATE: 110 BPM
VENTRICULAR RATE: 54 BPM

## 2018-09-10 PROCEDURE — 93010 ELECTROCARDIOGRAM REPORT: CPT | Performed by: INTERNAL MEDICINE

## 2018-09-10 NOTE — TELEPHONE ENCOUNTER
Please advise  Teofilo Rocha from [facility] would like to know if it is okay for pt to take both 75mg Plavix & 3mg Coumadin?

## 2018-09-10 NOTE — TELEPHONE ENCOUNTER
MEENA FROM [facility] CALLED & WANTS TO KNOW IF PT IS TO BE TAKING WARFARIN, PLAVIX & ASA ALL TOGETHER; PLEASE CALL HIM -[phone]

## 2018-09-10 NOTE — CASE MANAGEMENT
Initial Clinical Review    Admission: Date/Time/Statement: 9/8/18 @ 0008     Orders Placed This Encounter   Procedures    Inpatient Admission     Standing Status:   Standing     Number of Occurrences:   1     Order Specific Question:   Admitting Physician     Answer:   Laurence Tao [[id number]]     Order Specific Question:   Level of Care     Answer:   Level 1 Stepdown [13]     Order Specific Question:   Estimated length of stay     Answer:   More than 2 Midnights     Order Specific Question:   Certification     Answer:   I certify that inpatient services are medically necessary for this patient for a duration of greater than two midnights  See H&P and MD Progress Notes for additional information about the patient's course of treatment  Chief Complaint: No chief complaint on file  History of Illness: Ekta Diane is a 71 y o  female who presents from PACU after having an arteriogram with SFA angioplasty for intermittent bilateral claudication    She did well post procedure and the sheath was pulled per protocol  She developed a hematoma afterwards  Manual pressure was held by cath lab staff for >30 minutes  A femstop was then applied  The patient did get hypotensive with an SBP in the 80s and bradycardiac with a HR in the low 50s  She was given fluids and taken for a stat CT scan to rule out retroperitonel bleed    She will be admitted to the stepdown unit      Vital Signs (abnormal):   1300  --   54  20  124/57  --  124/57  84 mmHg  98 %  Nasal cannula  --  --   09/07/18 1245  --   54  17  137/56  --  137/56  86 mmHg  98 %  Nasal cannula  --  --   09/07/18 1230  --   52  15  125/55  --  125/55  81 mmHg  94 %  Nasal cannula  --  --   09/07/18 1215  --   53  14  131/57  --  131/57  85 mmHg  95 %  Nasal cannula  --  --   09/07/18 1200  --   54  17  150/62  --  150/62  96 mmHg  96 %  Nasal cannula  --  --   09/07/18 1145  --  57  21  164/72  --  150/66  99 mmHg  97 %  Nasal cannula  WDL  --   BP: Cardiology PA notified at 09/07/18 1145   09/07/18 1130  --  --  --  --  --  --  --  --  --  X  --   09/07/18 1127  --  57  22  142/62  --  --  --  96 %  Nasal cannula  WDL  --   09/07/18 0707  98 1 °F (36 7 °C)   47  20  131/61  --  --  --  94 %  None (Room              Abnormal Labs/Diagnostic Test Results: BUN creat  38 1 72, gluc 189, pt inr  15 1 1 20  Activated clotting time 230, 195, 172, 154  9/7 H&H  10 4  32 0  10 2  32 0    Past Medical/Surgical History:    Active Ambulatory Problems     Diagnosis Date Noted    HTN (hypertension) 12/10/2017    COPD (chronic obstructive pulmonary disease) ([place] [address] ) 12/10/2017    Acute on chronic respiratory failure ([place] [address] ) 03/22/2018    Atrial flutter ([place] [address] ) 03/22/2018    Back pain 03/22/2018    Dementia 03/23/2018    Hyperlipidemia 03/23/2018    UTI (urinary tract infection) 03/24/2018    Seizure disorder ([place] [address] ) 03/24/2018    RSV infection 03/24/2018    Ambulatory dysfunction 03/29/2018    Hypernatremia 04/13/2018    Stage 3 chronic kidney disease 04/13/2018    Bilateral leg edema 04/13/2018    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 04/18/2018    JESENIA (obstructive sleep apnea) 05/04/2018    Intermittent claudication of both lower extremities due to atherosclerosis ([place] [address] ) 05/04/2018    Atrial fibrillation ([place] [address] ) 05/07/2018    SOB (shortness of breath) 06/17/2018    Chronic indwelling Davis catheter 06/17/2018    Bradycardia 06/18/2018    Chronic hypoxemic respiratory failure ([place] [address] ) 06/18/2018       Past Medical History:   Diagnosis Date    Atrial fibrillation ([place] [address] )     Brain aneurysm     CAD (coronary artery disease)     Cardiac disease     CHF (congestive heart failure) (HCC)     CKD (chronic kidney disease)     COPD (chronic obstructive pulmonary disease) (HCC)     Dementia     Dementia     Disease of thyroid gland     GERD (gastroesophageal reflux disease)     Hyperlipidemia     Hyperlipidemia     Hypertension     [facility] infarction) [facility] )     Migraine     Renal disorder     Seizures ([facility] )     Stroke [facility])        Admitting Diagnosis: Intermittent claudication of both lower extremities due to atherosclerosis ([place] [address] ) [I70 213]    Age/Sex: 71 y o  female    Assessment/Plan: Assessment:   1  Acute blood loss anemia secondary to right groin hematoma  2  Hypovolemic shock secondary to 1   3   Status post arteriogram and SFA angioplasty  4  Hypertension  5  Chronic diastolic congestive heart failure  6  CAD with history of stents  7  COPD  8  Dementia  9  Paroxysmal AFib with chronic anticoagulation  10  CKD   Plan:                Neuro:   Dementia-delirium precautions, frequent reorientation,  help regulate sleep wake cycle  [facility] ICU  Sleep hygiene                 CV:   Hypovolemic shock secondary to acute blood loss anemia-improved with fluid administration, continue to trend hemoglobin, continue hemodynamic monitoring  CAD with history of stents-continue aspirin, hold Plavix for now in light of acute bleeding  Chronic diastolic congestive heart failure-does not appear to have acute exacerbation currently-will hold Lasix in light of dye loads received today  AFib on chronic anticoagulation-did have some mild bradycardia with the acute groin hematoma, that has improved, will monitor her rhythm closely and continue her home medications  Will hold anticoagulation currently                  Lung:   COPD-not in acute exacerbation currently-continue inhalers and p r n  bronchodilator                 GI:   NPO until patient able to sit up in safely take p o                  FEN:   Monitor lytes and replace as needed                 :   Chronic kidney disease-baseline creatinine appears to be 1 4-1 7 range, postprocedure creatinine was 1 5    Will continue to monitor very closely in light of her chronic kidney disease, absence of right kidney, and significant dye load that she received today for for the arteriogram and then secondarily for the CTA to evaluate for retroperitoneal bleed and active extravasation  Her Lasix will be held and we will continue with gentle IV hydration                  ID:   Monitor fever curve and WBC                  Heme:   Acute blood loss anemia secondary to right groin hematoma-will follow with serial hemoglobins and transfuse as needed  Hold anticoagulation                 Endo: Will order Accu-Cheks and sliding scale insulin                 Msk/Skin:   Frequent turning and repositioning  Fall precautions                 Disposition:   Will admit the patient to step-down unit  The patient will be a full code      VTE Pharmacologic Prophylaxis: Reason for no pharmacologic prophylaxis Acute blood loss anemia  VTE Mechanical Prophylaxis: sequential compression device     Invasive lines and devices:       Invasive Devices            Peripheral Intravenous Line                     Peripheral IV 09/07/18 Left Hand less than 1 day                   Drain                     Urethral Catheter Double-lumen 20 Fr  -- days                     Code Status: Level 1 - Full Code  POA: YesPOLST:     Given critical illness, patient length of stay will require greater than two midnights      Admission Orders:  Current Facility-Administered Medications:  acetaminophen 488 mg Oral Q6H PRN Tanner Sung, DO     albuterol 2 puff Inhalation Q6H PRN Tanner Sung, DO     albuterol 2 5 mg Nebulization Q6H PRN Tanner Sung, DO     allopurinol 300 mg Oral QAM Tanner Sung, DO     aspirin 81 mg Oral Daily Tanner Sung, DO     atorvastatin 10 mg Oral Daily Tanner Sung, DO     buPROPion 150 mg Oral Daily Tanner Sung, DO     carBAMazepine 100 mg Oral TID Tanner Sung, DO     diltiazem 180 mg Oral Q12H [address] Tanner Sung, DO     divalproex sodium 250 mg Oral Daily Tanner Sung, DO     ferrous sulfate 325 mg Oral BID Tanner Sung, DO     fluticasone-vilanterol 1 puff Inhalation Daily Tanner Sung, DO     [facility] Hold] insulin lispro 1-5 Units Subcutaneous TID AC TANVI Dozier     levothyroxine 88 mcg Oral Early Morning Guadlupe Schlatter, DO     metoprolol tartrate 50 mg Oral Q12H [facility] Guadlupe Schlatter, DO     morphine     Code/Trauma/Sedation Med Eyadlupe Schlatter, DO     morphine injection 2 mg Intravenous Q4H PRN Santiago Ocasio PA-C     nitroglycerin 0 4 mg Sublingual Q5 Min PRN Guadlupe Schlatter, DO     potassium chloride 20 mEq Oral Daily Guadlupe Schlatter, DO     sodium chloride 75 mL/hr Intravenous Continuous Taqueria Tapia MD Last Rate: 75 mL/hr (09/08/18 0526)      Continuous Infusions:  sodium chloride 75 mL/hr Last Rate: 75 mL/hr (09/08/18 0526)      PRN Meds:     acetaminophen 488 mg Q6H PRN   albuterol 2 puff Q6H PRN   albuterol 2 5 mg Q6H PRN   morphine   Code/Trauma/Sedation Med   morphine injection 2 mg Q4H PRN   nitroglycerin 0 4 mg Q5 Min PRN     Fingerstick ac and hs   Cont OBS   Speech eval   OT PT eval   Cardiology consult   Tele   I&O   Neurovascular checks  Fall precautions  Daily weight   SCD    cardiology consult  9/5  Assessment/Plan:  1  Right groin hematoma:  -hematoma appears to be resolving  -hemoglobin 10 0   -pseudoaneurysm study pending   -aspirin restarted   2  PAD, claudication:  Had balloon angioplasty, no stents placed  Procedure complicated by hematoma as above    3   Chronic diastolic Congestive heart failure:  Currently euvolemic, last EF 55-65%  Continue Lasix  Low-salt diet, daily weights    4   Atrial fibrillation/flutter:  Continue Cardizem and Lopressor for rate control  Coumadin held given hematoma    5   Hypertension:  Stable, continue present regimen    6   Hyperlipidemia:  Continue statin  Critical Care note 9/8  Assessment:   1   Acute blood loss anemia secondary to right groin hematoma  2   Hypovolemic shock secondary to 1   3   Status post arteriogram and SFA angioplasty  4   Hypertension  5   Chronic diastolic congestive heart failure  6   CAD with history of stents  7   COPD  8   Dementia  9   Paroxysmal AFib with chronic anticoagulation  10   CKD   Plan:                Neuro:   -patient has chronic dementia  -continue with delirium precautions  -sleep hygiene              CV:   -hypotension resolved with fluid resuscitation   -hemodynamics have been stable overnight   -ASA/plavix and coumadin on hold given acute blood loss anemia    Will check US for pseudoaneurysm and restart today if negative   -continue statin  -continue diltiazem/BB for rate control   -cardiology following, appreciate recommendations               Lung:   -pulmonary hygiene  -incentive spirometry if patient will participate  -continue home dose breo  -patient not in exacerbation               GI:   -start diet at dysphagia and have speech therapy evaluate patient, there was concern for difficulty swallowing              FEN:   -monitor electrolytes and replete electrolytes               :   -monitor urine output               ID:   -monitor temperature curve and WBCs              Heme:   -hgb remain stable s/p femstop and source control, hemodynamics improved  -US pending for evaluation of pseudoaneurysm, if negative will restart anticoagulation               Endo:   -monitor glucose via bmp               Msk/Skin:   -turn and reposition while in bed               Disposition:   -transfer to MS/tele

## 2018-09-10 NOTE — TELEPHONE ENCOUNTER
PT WAS DISCHARGED FROM THE HOSPITAL LAST NIGHT AND WAS TOLD BY SANDRA MYERS TO TAKE 75MG OF PLAVIX  PT NORMALLY TAKES 3MG OF COUMADIN  CARRI FROM [facility] WANTS TO KNOW IF PT SHOULD BE TAKING BOTH  PLEASE CALL CARRI BACK TO LET HER KNOW   Aki Bernstein

## 2018-09-10 NOTE — PHYSICIAN ADVISOR
Current patient class: Inpatient  The patient is currently on Hospital Day: 3 at [address]      [facility] for the following diagnosis:  Intermittent claudication of both lower extremities due to atherosclerosis ([facility] [facility] ) [I70 213]       There is documentation in the medical record of an expected length of stay of at least 2 midnights  The patient is therefore expected to satisfy the 2 midnight benchmark and given the 2 midnight presumption is appropriate for INPATIENT ADMISSION  Given this expectation of a satisfying stay, CMS instructs us that the patient is most often appropriate for inpatient admission under part A provided medical necessity is documented in the chart  After review of the relevant documentation, labs, vital signs and test results, the patient is appropriate for INPATIENT ADMISSION  Admission to the hospital as an inpatient is a complex decision making process which requires the practitioner to consider the patients presenting complaint, history and physical examination and all relevant testing  With this in mind, in this case, the patient was deemed appropriate for INPATIENT ADMISSION  After review of the documentation and testing available at the time of the admission I concur with this clinical determination of medical necessity  Rationale is as follows: The patient is a 71 yrs old Female who presented to the ED at 9/7/2018  3:40 PM with a chief complaint of hematoma  Given the need for further hospitalization, and along with the documentation of medical necessity present in the chart, the patient is appropriate for inpatient admission  The patient is expected to satisfy the 2 midnight benchmark, and will require further acute medical care  The patient does have comorbid conditions which increases the risk for significant adverse outcome   Given this the patient is appropriate for inpatient admission        The patients vitals on arrival were ED Triage Vitals   Temperature Pulse Respirations Blood Pressure SpO2   09/07/18 0707 09/07/18 0707 09/07/18 0707 09/07/18 0707 09/07/18 0707   98 1 °F (36 7 °C) (!) 47 20 131/61 94 %      Temp Source Heart Rate Source Patient Position - Orthostatic VS BP Location FiO2 (%)   09/07/18 0707 09/07/18 0707 09/07/18 1858 09/07/18 1858 --   Temporal Monitor Lying Right arm       Pain Score       09/07/18 2000 7           Past Medical History:   Diagnosis Date    Atrial fibrillation [facility]     Brain aneurysm     CAD (coronary artery disease)     Cardiac disease     had stents 12 years ago in [facility]    CHF (congestive heart failure) ([facility] )     CKD (chronic kidney disease)     COPD (chronic obstructive pulmonary disease) (HCC)     Dementia     Dementia     Disease of thyroid gland     GERD (gastroesophageal reflux disease)     Hyperlipidemia     Hyperlipidemia     Hypertension     MI (myocardial infarction) ([facility] )     Migraine     Renal disorder     Seizures ([facility]     [facility]      Past Surgical History:   Procedure Laterality Date    APPENDECTOMY      BLADDER TUMOR EXCISION      BRAIN SURGERY      1998  clip right frontal lobe    CARDIAC SURGERY      COLONOSCOPY      CORONARY STENT PLACEMENT      5 stents    EYE SURGERY      MANDIBLE FRACTURE SURGERY      TONSILLECTOMY      TUBAL LIGATION      UTERINE FIBROID SURGERY             Consults have been placed to:   IP CONSULT TO CASE MANAGEMENT  IP CONSULT TO CARDIOLOGY    Vitals:    09/09/18 0250 09/09/18 0600 09/09/18 0700 09/09/18 1056   BP: 121/70  127/60 115/59   BP Location: Left arm  Left arm Left arm   Pulse: 55  55 (!) 48   Resp: 16 18 18   Temp: 98 8 °F (37 1 °C)  98 5 °F (36 9 °C) 98 7 °F (37 1 °C)   TempSrc: Oral  Oral Oral   SpO2: 96%  95% 97%   Weight:  87 kg (191 lb 12 8 oz)     Height:           Most recent labs:    Recent Labs      09/09/18   0455  09/09/18 1309   WBC  7 87   --    HGB  8 4*  9 0*   HCT  26 8*  29 2*   PLT  118*   --    K  4 3   --    NA  146*   --    CALCIUM  8 6   --    BUN  33*   --    CREATININE  1 46*   --    INR   --   1 19*       Scheduled Meds:  Continuous Infusions:  No current facility-administered medications for this encounter  PRN Meds:  Surgical procedures (if appropriate):   * No procedures listed *

## 2018-09-13 ENCOUNTER — OFFICE VISIT (OUTPATIENT)
Dept: CARDIOLOGY CLINIC | Facility: CLINIC | Age: 73
End: 2018-09-13
Payer: MEDICARE

## 2018-09-13 VITALS
SYSTOLIC BLOOD PRESSURE: 122 MMHG | OXYGEN SATURATION: 95 % | DIASTOLIC BLOOD PRESSURE: 60 MMHG | HEIGHT: 61 IN | HEART RATE: 50 BPM | BODY MASS INDEX: 34.89 KG/M2 | WEIGHT: 184.8 LBS

## 2018-09-13 DIAGNOSIS — I10 ESSENTIAL HYPERTENSION: ICD-10-CM

## 2018-09-13 DIAGNOSIS — I70.213 INTERMITTENT CLAUDICATION OF BOTH LOWER EXTREMITIES DUE TO ATHEROSCLEROSIS (HCC): ICD-10-CM

## 2018-09-13 DIAGNOSIS — I73.9 PERIPHERAL ARTERY DISEASE (HCC): ICD-10-CM

## 2018-09-13 DIAGNOSIS — I48.0 PAROXYSMAL ATRIAL FIBRILLATION (HCC): Primary | ICD-10-CM

## 2018-09-13 DIAGNOSIS — E78.2 MIXED HYPERLIPIDEMIA: ICD-10-CM

## 2018-09-13 DIAGNOSIS — R60.0 BILATERAL LEG EDEMA: ICD-10-CM

## 2018-09-13 PROCEDURE — 99214 OFFICE O/P EST MOD 30 MIN: CPT | Performed by: INTERNAL MEDICINE

## 2018-09-13 NOTE — PROGRESS NOTES
Cardiology Consultation     Jason Bejarano  [id number]  1945  [address] [facility],[place] [facility] [address]    C/c:FOLLOW-UP OF PERIPHERAL ARTERY DISEASE AND LEFT SFA ANGIOPLASTY  HPI:  80-YEAR-OLD FEMALE PATIENT WITH PAST MEDICAL HISTORY OF CORONARY DISEASE STATUS POST PCI, PERIPHERAL ARTERY DISEASE status post left SFA angioplasty recently, hypertension hyperlipidemia, CKD, AFib/flutter is here for follow-up after hospital discharge  Patient had bilateral lower extremity claudication  She had aortogram with bilateral infrainguinal runoff  There was significant left distal SFA lesion which was intervened successfully with Lutonix drug coated balloon  Patient still has significant lesions in the right  External iliac artery and right femoral artery which are not intervened  She continues to have claudication of the right lower extremity  patient had hematoma in the right groin following intervention  Patient had CT angiogram which did not show any retroperitoneal bleed or pseudoaneurysm  Hemoglobin remained stable  Patient's serum creatinine remained stable and she was discharged  Left leg claudication has resolved  Patient continues to have claudication of the right leg  She describes claudication as tightness in the legs      Patient Active Problem List   Diagnosis    HTN (hypertension)    COPD [facility]chronic obstructive pulmonary disease) [facility]    Acute on chronic respiratory failure (HCC)    Atrial flutter (HCC)    Back pain    Dementia    Hyperlipidemia    UTI (urinary tract infection)    Seizure disorder ([facility] )    RSV infection    Ambulatory dysfunction    Hypernatremia    Stage 3 chronic kidney disease    Bilateral leg edema    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    JESENIA (obstructive sleep apnea)    Intermittent claudication of both lower extremities due to atherosclerosis ([place] [address] )    Atrial fibrillation ([facility] )    SOB (shortness of breath)    Chronic indwelling Davis catheter    Bradycardia    [facility] failure [facility]    Essential hypertension    Peripheral artery disease ([facility] )    Hematoma     Past Medical History:   Diagnosis Date    Atrial fibrillation ([facility] )     Brain aneurysm     CAD (coronary artery disease)     Cardiac disease     had stents 12 years ago in [facility]    CHF (congestive heart failure) ([place] [address] )     CKD (chronic kidney disease)     COPD (chronic obstructive pulmonary disease) ([place] [address] )     Dementia     Dementia     Disease of thyroid gland     GERD (gastroesophageal reflux disease)     Hyperlipidemia     Hyperlipidemia     Hypertension     MI (myocardial infarction) ([place] [address] )     Migraine     Renal disorder     Seizures ([place] [address] )     Stroke ([place] [address] )      Social History     Social History    Marital status:      Spouse name: N/A    Number of children: 3    Years of education: N/A     Occupational History    retired      Social History Main Topics    Smoking status: Former Smoker     Quit date: 9/7/2007    Smokeless tobacco: Never Used    Alcohol use No    Drug use: No    Sexual activity: Not on file     Other Topics Concern    Not on file     Social History Narrative    No narrative on file      Family History   Problem Relation Age of Onset    Heart disease Father     Parkinsonism Father     Macular degeneration Mother     Glaucoma Mother     Diabetes Brother     Heart disease Brother      Past Surgical History:   Procedure Laterality Date    APPENDECTOMY      BLADDER TUMOR EXCISION      BRAIN SURGERY      1998  clip right frontal lobe    CARDIAC SURGERY      COLONOSCOPY      CORONARY STENT PLACEMENT      5 stents    EYE SURGERY      MANDIBLE FRACTURE SURGERY      TONSILLECTOMY      TUBAL LIGATION      UTERINE FIBROID SURGERY         Current Outpatient Prescriptions:    acetaminophen (TYLENOL) 500 mg tablet, Take 500 mg by mouth every 6 (six) hours as needed for mild pain, Disp: , Rfl:     albuterol (2 5 mg/3 mL) 0 083 % nebulizer solution, Take 2 5 mg by nebulization every 6 (six) hours as needed for wheezing, Disp: , Rfl:     albuterol (PROVENTIL HFA,VENTOLIN HFA) 90 mcg/act inhaler, Inhale 2 puffs every 6 (six) hours as needed for wheezing, Disp: , Rfl:     allopurinol (ZYLOPRIM) 300 mg tablet, Take 300 mg by mouth every morning  , Disp: , Rfl:     atorvastatin (LIPITOR) 10 mg tablet, Take 10 mg by mouth daily, Disp: , Rfl:     buPROPion (WELLBUTRIN SR) 150 mg 12 hr tablet, Take 150 mg by mouth daily, Disp: , Rfl:     carBAMazepine (TEGretol) 100 mg chewable tablet, Chew 100 mg 3 (three) times a day, Disp: , Rfl:     Cholecalciferol (VITAMIN D) 2000 units CAPS, Take 1 capsule by mouth daily  , Disp: , Rfl:     Citric Xh-Bzsckdpuinx-Ii Carb (RENACIDIN) SOLN, Indications: 60ml to irrigate baca once a week, Disp: , Rfl:     clopidogrel (PLAVIX) 75 mg tablet, Take 1 tablet (75 mg total) by mouth daily, Disp: 90 tablet, Rfl: 0    diltiazem (CARDIZEM SR) 90 mg 12 hr capsule, Take 2 capsules (180 mg total) by mouth every 12 (twelve) hours, Disp: 30 capsule, Rfl: 0    divalproex sodium (DEPAKOTE ER) 250 mg 24 hr tablet, Take 1 tablet (250 mg total) by mouth daily, Disp: 30 tablet, Rfl: 0    fenofibrate micronized (LOFIBRA) 67 MG capsule, Take 67 mg by mouth every morning before breakfast, Disp: , Rfl:     ferrous sulfate 325 (65 Fe) mg tablet, Take 325 mg by mouth 2 (two) times a day, Disp: , Rfl:     fluticasone furoate-vilanterol (BREO ELLIPTA), Inhale 1 puff daily, Disp: , Rfl:     furosemide (LASIX) 40 mg tablet, Take 1 tablet (40 mg total) by mouth 2 (two) times a day, Disp: 180 tablet, Rfl: 3    Levothyroxine Sodium 88 MCG CAPS, Take 88 mcg by mouth every morning  , Disp: , Rfl:     metoprolol tartrate (LOPRESSOR) 50 mg tablet, Take 1 tablet (50 mg total) by mouth every 12 (twelve) hours, Disp: 60 tablet, Rfl: 0    potassium chloride (K-DUR,KLOR-CON) 20 mEq tablet, Take 1 tablet (20 mEq total) by mouth daily, Disp: 15 tablet, Rfl: 0    Sennosides-Docusate Sodium (SENNA PLUS PO), Take by mouth daily as needed, Disp: , Rfl:     warfarin (COUMADIN) 3 mg tablet, One tablet daily or as otherwise directed , Disp: 90 tablet, Rfl: 2    aspirin (ECOTRIN LOW STRENGTH) 81 mg EC tablet, Take 81 mg by mouth daily, Disp: , Rfl:     diphenhydrAMINE (BENADRYL) 25 mg tablet, TAKE 1 TABLET THE EVENING PRIOR AND 1 TABLET THE MORNING OF PROCEDURE (Patient not taking: Reported on 9/13/2018 ), Disp: 30 tablet, Rfl: 0    nitroglycerin (NITROSTAT) 0 4 mg SL tablet, Place 1 tablet (0 4 mg total) under the tongue every 5 (five) minutes as needed for chest pain (Patient not taking: Reported on 9/13/2018 ), Disp: 90 tablet, Rfl: 0  Allergies   Allergen Reactions    Spiriva Handihaler [Tiotropium Bromide Monohydrate] Swelling    Ramipril Facial Swelling    Penicillins      unsure     Vitals:    09/13/18 1448   BP: 122/60   BP Location: Left arm   Patient Position: Sitting   Cuff Size: Adult   Pulse: (!) 50   SpO2: 95%   Weight: 83 8 kg (184 lb 12 8 oz)   Height: 5' 1" (1 549 m)       Labs:  Admission on 09/07/2018, Discharged on 09/09/2018   Component Date Value    Sodium 09/07/2018 144     Potassium 09/07/2018 4 6     Chloride 09/07/2018 107     CO2 09/07/2018 31     ANION GAP 09/07/2018 6     BUN 09/07/2018 38*    Creatinine 09/07/2018 1 72*    Glucose 09/07/2018 189*    Glucose, Fasting 09/07/2018 189*    Calcium 09/07/2018 9 4     eGFR 09/07/2018 29     Protime 09/07/2018 15 1*    INR 09/07/2018 1 20*    Activated Clotting Time,* 09/07/2018 230*    Specimen Type 09/07/2018 ARTERIAL     Activated Clotting Time,* 09/07/2018 195*    Specimen Type 09/07/2018 ARTERIAL     Activated Clotting Time,* 09/07/2018 172*    Specimen Type 09/07/2018 ARTERIAL     Activated Clotting Time,* 09/07/2018 154*    Specimen Type 09/07/2018 ARTERIAL     ABO Grouping 09/07/2018 A     Rh Factor 09/07/2018 Positive     Antibody Screen 09/07/2018 Negative     Specimen Expiration Date 09/07/2018 [id number]     Protime 09/07/2018 16 3*    INR 09/07/2018 1 32*    WBC 09/07/2018 9 36     RBC 09/07/2018 3 10*    Hemoglobin 09/07/2018 10 4*    Hematocrit 09/07/2018 32 0*    MCV 09/07/2018 103*    MCH 09/07/2018 33 5     MCHC 09/07/2018 32 5     RDW 09/07/2018 13 3     Platelets 69/71/5700 154     MPV 09/07/2018 12 2     Sodium 09/07/2018 143     Potassium 09/07/2018 4 1     Chloride 09/07/2018 110*    CO2 09/07/2018 26     ANION GAP 09/07/2018 7     BUN 09/07/2018 36*    Creatinine 09/07/2018 1 53*    Glucose 09/07/2018 187*    Glucose, Fasting 09/07/2018 187*    Calcium 09/07/2018 8 8     eGFR 09/07/2018 34     Hemoglobin 09/07/2018 10 2*    Hematocrit 09/07/2018 32 0*    Hemoglobin 09/08/2018 9 5*    Hematocrit 09/08/2018 29 3*    POC Glucose 09/08/2018 134     WBC 09/08/2018 12 82*    RBC 09/08/2018 2 94*    Hemoglobin 09/08/2018 10 0*    Hematocrit 09/08/2018 31 5*    MCV 09/08/2018 107*    MCH 09/08/2018 34 0     MCHC 09/08/2018 31 7     RDW 09/08/2018 13 7     MPV 09/08/2018 12 3     Platelets 39/97/7734 153     nRBC 09/08/2018 0     Neutrophils Relative 09/08/2018 77*    Immat GRANS % 09/08/2018 1     Lymphocytes Relative 09/08/2018 14     Monocytes Relative 09/08/2018 8     Eosinophils Relative 09/08/2018 0     Basophils Relative 09/08/2018 0     Neutrophils Absolute 09/08/2018 9 84*    Immature Grans Absolute 09/08/2018 0 07     Lymphocytes Absolute 09/08/2018 1 84     Monocytes Absolute 09/08/2018 1 06     Eosinophils Absolute 09/08/2018 0 00     Basophils Absolute 09/08/2018 0 01     Sodium 09/08/2018 146*    Potassium 09/08/2018 4 0     Chloride 09/08/2018 112*    CO2 09/08/2018 28     ANION GAP 09/08/2018 6     BUN 09/08/2018 35*  Creatinine 09/08/2018 1 57*    Glucose 09/08/2018 130     Glucose, Fasting 09/08/2018 130*    Calcium 09/08/2018 9 4     eGFR 09/08/2018 33     Calcium, Ionized 09/08/2018 1 21     Magnesium 09/08/2018 2 5     POC Glucose 09/08/2018 138     Hemoglobin 09/08/2018 9 5*    Hematocrit 09/08/2018 30 0*    POC Glucose 09/08/2018 98     POC Glucose 09/08/2018 100     Hemoglobin 09/08/2018 9 2*    Hematocrit 09/08/2018 29 2*    Hemoglobin 09/09/2018 8 0*    Hematocrit 09/09/2018 25 8*    POC Glucose 09/09/2018 131     WBC 09/09/2018 7 87     RBC 09/09/2018 2 46*    Hemoglobin 09/09/2018 8 4*    Hematocrit 09/09/2018 26 8*    MCV 09/09/2018 109*    MCH 09/09/2018 34 1     MCHC 09/09/2018 31 3*    RDW 09/09/2018 13 8     MPV 09/09/2018 12 7     Platelets 44/82/9243 118*    nRBC 09/09/2018 0     Neutrophils Relative 09/09/2018 60     Immat GRANS % 09/09/2018 1     Lymphocytes Relative 09/09/2018 28     Monocytes Relative 09/09/2018 8     Eosinophils Relative 09/09/2018 2     Basophils Relative 09/09/2018 1     Neutrophils Absolute 09/09/2018 4 77     Immature Grans Absolute 09/09/2018 0 06     Lymphocytes Absolute 09/09/2018 2 17     Monocytes Absolute 09/09/2018 0 66     Eosinophils Absolute 09/09/2018 0 17     Basophils Absolute 09/09/2018 0 04     Sodium 09/09/2018 146*    Potassium 09/09/2018 4 3     Chloride 09/09/2018 112*    CO2 09/09/2018 30     ANION GAP 09/09/2018 4     BUN 09/09/2018 33*    Creatinine 09/09/2018 1 46*    Glucose 09/09/2018 106     Calcium 09/09/2018 8 6     eGFR 09/09/2018 36     Magnesium 09/09/2018 2 1     POC Glucose 09/09/2018 169*    Hemoglobin 09/09/2018 9 0*    Hematocrit 09/09/2018 29 2*    Protime 09/09/2018 15 0*    INR 09/09/2018 1 19*    POC Glucose 09/09/2018 198*   Lab on 08/31/2018   Component Date Value    Ventricular Rate 09/03/2018 110     Atrial Rate 09/03/2018 110     WI Interval 09/03/2018 136     QRSD Interval 09/03/2018 70     QT Interval 09/03/2018 318     QTC Interval 09/03/2018 430     P Axis 09/03/2018 72     QRS Axis 09/03/2018 -1     T Wave Axis 09/03/2018 17     Ventricular Rate 09/07/2018 54     Atrial Rate 09/07/2018 54     WV Interval 09/07/2018 194     QRSD Interval 09/07/2018 106     QT Interval 09/07/2018 480     QTC Interval 09/07/2018 455     P Axis 09/07/2018 64     QRS Axis 09/07/2018 0     T Wave Axis 09/07/2018 20    Office Visit on 08/24/2018   Component Date Value    WBC 08/31/2018 5 65     RBC 08/31/2018 3 50*    Hemoglobin 08/31/2018 11 8     Hematocrit 08/31/2018 36 6     MCV 08/31/2018 105*    MCH 08/31/2018 33 7     MCHC 08/31/2018 32 2     RDW 08/31/2018 13 2     Platelets 22/88/4393 163     MPV 08/31/2018 12 3    Anticoag visit on 08/16/2018   Component Date Value    INR 08/15/2018 2 20*   Anticoag visit on 07/20/2018   Component Date Value    INR 07/19/2018 1 60*     Imaging: Cta Chest Abdomen Pelvis W Wo Contrast    Result Date: 9/7/2018  Narrative: CT ANGIOGRAM OF THE CHEST, ABDOMEN AND PELVIS WITH AND WITHOUT IV CONTRAST INDICATION:  Chest and abdominal pain and trauma  COMPARISON: 3/22/2018  TECHNIQUE:  CT angiogram examination of the chest, abdomen and pelvis was performed according to standard protocol  This examination, like all CT scans performed in the [facility], was performed utilizing techniques to minimize radiation dose exposure, including the use of iterative reconstruction and automated exposure control  Contrast as well as noncontrast images were obtained  3D reconstructions were performed an independent workstation, and are supplied for review  Rad dose 2644 09 mGy-cm IV Contrast:  100 mL of iodixanol (VISIPAQUE) Enteric Contrast: FINDINGS: VASCULAR STRUCTURES:  Left vertebral artery arises directly from the aortic arch  Calcified plaque at the great vessel origins without hemodynamically significant stenosis    No evidence of thoracic or abdominal aortic aneurysm or dissection  Extensive calcified plaque in the iliac arteries  Moderate to severe (60-80%) stenosis in the right common and external iliac arteries  Mild to moderate (50-60%) stenosis in the left common and external iliac arteries  Calcified plaque in the mesenteric and left renal arteries without significant stenosis  No evidence of filling defect in the visualized proximal pulmonary arteries to suggest acute embolus  OTHER FINDINGS: CHEST: HEART: Cardiomegaly  Extensive calcified plaque throughout the coronary arteries  LUNGS:  No pulmonary contusion, laceration or pneumothorax  PLEURA: No hemothorax  MEDIASTINUM AND ANNE:  No mediastinal hematoma or lymphadenopathy  CHEST WALL AND LOWER NECK: Normal  ABDOMEN LIVER/BILIARY TREE:  No biliary obstruction  GALLBLADDER:  Stable collapsed gallbladder containing gallstones  SPLEEN:  Unremarkable  Normal size  PANCREAS:  Unremarkable  ADRENAL GLANDS:  Unremarkable  KIDNEYS/URETERS:  Absent right kidney  Numerous cortical defects in the left kidney, likely secondary to renal vascular disease  No evidence of contusion, laceration or obstructive uropathy  PELVIS REPRODUCTIVE ORGANS:  No pelvic mass or fluid  URINARY BLADDER:  Collapsed containing Davis catheter balloon  ADDITIONAL ABDOMINAL AND PELVIC STRUCTURES: STOMACH AND BOWEL:  Right periumbilical 5 cm fat-containing hernia  Diverticulosis throughout the colon without evidence of diverticulitis or colitis  No gross evidence of bowel contusion  ABDOMINOPELVIC CAVITY:  No pathologically enlarged mesenteric or retroperitoneal lymph nodes  No ascites or free intraperitoneal air  ABDOMINAL WALL/INGUINAL REGIONS:  Right anterior abdominal wall contusion extending over the right suprapubic region, right groin and thigh OSSEOUS STRUCTURES: Increased thoracic kyphosis  Thoracic and lumbar vertebral body hemangiomas noted  No acute fracture or destructive osseous lesion  Impression: 1  Right anterior abdominal wall contusion and hematoma is extending over the right groin and thigh  No evidence of acute fracture  2   No acute intra-abdominal or pelvic trauma  3   No evidence of acute chest trauma  No thoracic or abdominal aortic aneurysm or dissection  4   Cholelithiasis without evidence of cholecystitis  Workstation performed: [id number]     Vas Pseudoaneurysm Study    Result Date: 9/9/2018  Narrative:  [facility] REPORT CLINICAL: Indications:  Injury to LE, Arterial Tibial Vessel,Unspecified [S85 [id number]]  S/p cath x 2 days ago  Superficial ecchymosis  Operative History: Cardiac Stents  FINDINGS:  Right                  PSV  Common Femoral Artery  140  Prox SFA               109  Dist Post Tibial        45  Dist  Ant  Tibial       36     CONCLUSION: Impression: No evidence of a pseudoaneurysm in the femoral artery  The common femoral vein is patent with no evidence of an arteriovenous fistula noted  Monophasic and turbulent flow visualized throughout arterial system  Consistent with known right sided iliac stenosis  Limited exam due to edema  Results given to covering floor nurse at time of study  SIGNATURE: Electronically Signed by: Aleisha Manning MD, [address] on 2018-09-09 11:48:56 AM      Review of Systems:  Review of Systems   Constitutional: Negative for diaphoresis and fatigue  HENT: Negative for congestion and facial swelling  Eyes: Negative for photophobia and visual disturbance  Respiratory: Negative for chest tightness and shortness of breath  Cardiovascular: Negative for chest pain, palpitations and leg swelling  Right lower extremity claudication   Gastrointestinal: Negative for abdominal pain and nausea  Endocrine: Negative for cold intolerance and heat intolerance  Musculoskeletal: Negative for arthralgias and myalgias  Skin: Negative for pallor and rash  Neurological: Negative for dizziness and tremors     Psychiatric/Behavioral: Negative for sleep disturbance  The patient is not nervous/anxious  Physical Exam:  Physical Exam   Constitutional: She is oriented to person, place, and time  She appears well-developed and well-nourished  HENT:   Head: Normocephalic and atraumatic  Eyes: Conjunctivae and EOM are normal  Pupils are equal, round, and reactive to light  Neck: Normal range of motion  Neck supple  No JVD present  No thyromegaly present  Cardiovascular: Normal rate, S1 normal, S2 normal, normal heart sounds and intact distal pulses  An irregularly irregular rhythm present  Exam reveals no gallop and no friction rub  No murmur heard  Pulses:       Carotid pulses are 2+ on the right side, and 2+ on the left side  Right groin, large ecchymosis present  Appears to be clearing  Pulmonary/Chest: Effort normal and breath sounds normal  No respiratory distress  She has no wheezes  She has no rales  Abdominal: Soft  Bowel sounds are normal  She exhibits no distension  There is no tenderness  There is no rebound and no guarding  Musculoskeletal: Normal range of motion  She exhibits no edema  Neurological: She is alert and oriented to person, place, and time  She has normal reflexes  No cranial nerve deficit  Skin: Skin is warm and dry  Psychiatric: She has a normal mood and affect  Discussion/Summary:  1  Right groin hematoma:  -hematoma appears to be resolving  -hemoglobin stable  - no pseudoaneurysm      2  PAD, claudication:   left leg claudication has resolved  Plavix for 3 months   Will schedule right  Common iliac artery and SFA angioplasty in 6 weeks  Follow up with me in 4- 5 weeks  Check BMP and CBC     3  Chronic diastolic Congestive heart failure:  Currently euvolemic, last EF 55-65%  Continue Lasix  Low-salt diet, daily weights      4   Atrial fibrillation/flutter:  Continue Cardizem and Lopressor for rate control  Coumadin held given hematoma      5    Hypertension:  Stable, continue present regimen      6    Hyperlipidemia:  Continue statin      Code Status: Level 1 - Full Code      Follow-up in 5 weeks

## 2018-10-05 ENCOUNTER — APPOINTMENT (OUTPATIENT)
Dept: LAB | Facility: HOSPITAL | Age: 73
End: 2018-10-05
Attending: INTERNAL MEDICINE
Payer: MEDICARE

## 2018-10-05 LAB
ANION GAP SERPL CALCULATED.3IONS-SCNC: 4 MMOL/L (ref 4–13)
BUN SERPL-MCNC: 38 MG/DL (ref 5–25)
CALCIUM SERPL-MCNC: 9.4 MG/DL (ref 8.3–10.1)
CHLORIDE SERPL-SCNC: 108 MMOL/L (ref 100–108)
CO2 SERPL-SCNC: 35 MMOL/L (ref 21–32)
CREAT SERPL-MCNC: 1.89 MG/DL (ref 0.6–1.3)
ERYTHROCYTE [DISTWIDTH] IN BLOOD BY AUTOMATED COUNT: 13.7 % (ref 11.6–15.1)
GFR SERPL CREATININE-BSD FRML MDRD: 26 ML/MIN/1.73SQ M
GLUCOSE P FAST SERPL-MCNC: 122 MG/DL (ref 65–99)
HCT VFR BLD AUTO: 32.1 % (ref 34.8–46.1)
HGB BLD-MCNC: 10.2 G/DL (ref 11.5–15.4)
MCH RBC QN AUTO: 34.3 PG (ref 26.8–34.3)
MCHC RBC AUTO-ENTMCNC: 31.8 G/DL (ref 31.4–37.4)
MCV RBC AUTO: 108 FL (ref 82–98)
PLATELET # BLD AUTO: 169 THOUSANDS/UL (ref 149–390)
PMV BLD AUTO: 11.9 FL (ref 8.9–12.7)
POTASSIUM SERPL-SCNC: 4.1 MMOL/L (ref 3.5–5.3)
RBC # BLD AUTO: 2.97 MILLION/UL (ref 3.81–5.12)
SODIUM SERPL-SCNC: 147 MMOL/L (ref 136–145)
WBC # BLD AUTO: 5.35 THOUSAND/UL (ref 4.31–10.16)

## 2018-10-05 PROCEDURE — 80048 BASIC METABOLIC PNL TOTAL CA: CPT | Performed by: INTERNAL MEDICINE

## 2018-10-05 PROCEDURE — 85027 COMPLETE CBC AUTOMATED: CPT | Performed by: INTERNAL MEDICINE

## 2018-10-05 PROCEDURE — 36415 COLL VENOUS BLD VENIPUNCTURE: CPT | Performed by: INTERNAL MEDICINE

## 2018-10-11 ENCOUNTER — TELEPHONE (OUTPATIENT)
Dept: PULMONOLOGY | Facility: CLINIC | Age: 73
End: 2018-10-11

## 2018-10-12 ENCOUNTER — OFFICE VISIT (OUTPATIENT)
Dept: CARDIOLOGY CLINIC | Facility: CLINIC | Age: 73
End: 2018-10-12
Payer: MEDICARE

## 2018-10-12 VITALS
HEART RATE: 50 BPM | OXYGEN SATURATION: 97 % | DIASTOLIC BLOOD PRESSURE: 54 MMHG | SYSTOLIC BLOOD PRESSURE: 120 MMHG | HEIGHT: 61 IN | WEIGHT: 186.2 LBS | BODY MASS INDEX: 35.16 KG/M2

## 2018-10-12 DIAGNOSIS — T50.8X5A CONTRAST DYE INDUCED NEPHROPATHY: ICD-10-CM

## 2018-10-12 DIAGNOSIS — R60.0 BILATERAL LEG EDEMA: ICD-10-CM

## 2018-10-12 DIAGNOSIS — N18.30 STAGE 3 CHRONIC KIDNEY DISEASE (HCC): Chronic | ICD-10-CM

## 2018-10-12 DIAGNOSIS — I70.213 INTERMITTENT CLAUDICATION OF BOTH LOWER EXTREMITIES DUE TO ATHEROSCLEROSIS (HCC): ICD-10-CM

## 2018-10-12 DIAGNOSIS — I48.0 PAROXYSMAL ATRIAL FIBRILLATION (HCC): Primary | ICD-10-CM

## 2018-10-12 DIAGNOSIS — I73.9 PERIPHERAL ARTERY DISEASE (HCC): ICD-10-CM

## 2018-10-12 DIAGNOSIS — I50.32 CHRONIC DIASTOLIC CONGESTIVE HEART FAILURE (HCC): ICD-10-CM

## 2018-10-12 DIAGNOSIS — N14.1 CONTRAST DYE INDUCED NEPHROPATHY: ICD-10-CM

## 2018-10-12 DIAGNOSIS — I10 ESSENTIAL HYPERTENSION: ICD-10-CM

## 2018-10-12 PROBLEM — N14.11 CONTRAST DYE INDUCED NEPHROPATHY: Status: ACTIVE | Noted: 2018-10-12

## 2018-10-12 PROCEDURE — 99214 OFFICE O/P EST MOD 30 MIN: CPT | Performed by: INTERNAL MEDICINE

## 2018-10-12 RX ORDER — FUROSEMIDE 40 MG/1
40 TABLET ORAL DAILY
Qty: 90 TABLET | Refills: 3 | Status: ON HOLD | OUTPATIENT
Start: 2018-10-12 | End: 2018-12-14

## 2018-10-12 NOTE — PROGRESS NOTES
Cardiology Consultation     Teetee Jean  [id number]  1945  [address] [place] [address]    C/c:FOLLOW-UP OF PERIPHERAL ARTERY DISEASE AND LEFT SFA ANGIOPLASTY      HPI:  80-YEAR-OLD FEMALE PATIENT WITH PAST MEDICAL HISTORY OF CORONARY DISEASE STATUS POST PCI, PERIPHERAL ARTERY DISEASE status post left SFA angioplasty recently, hypertension hyperlipidemia, CKD, AFib/flutter is here for follow-up  Patient continues to have significant right lower extremity claudication which she describes as like tightness on exertion  Patient still has significant lesions in the right external iliac artery and right femoral artery which are not intervened  Patient had hematoma in the right groin following intervention   WHICH HAS RESOLVED  Hemoglobin remained stable  PATIENT DOES HAVE MILD LOWER EXTREMITY EDEMA WHICH IS CHRONIC she denies having any chest pain, shortness of breath, orthopnea, paroxysmal nocturnal dyspnea, significant weight gain  Patient is also not able to wear CPAP at night as it is very uncomfortable        SERUM CREATININE IS INCREASED FROM BASELINE OF 1 55-1 89    Patient Active Problem List   Diagnosis    HTN (hypertension)    [facility]) [facility] [address] [facility]    Acute on chronic respiratory failure (HCC)    Atrial flutter (HCC)    Back pain    Dementia    Hyperlipidemia    UTI (urinary tract infection)    Seizure disorder ([facility] [address] )    RSV infection    Ambulatory dysfunction    Hypernatremia    Stage 3 chronic kidney disease (HCC)    Bilateral leg edema    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    CHF (congestive heart failure) ([place] [address] )     CKD (chronic kidney disease)     COPD (chronic obstructive pulmonary disease) ([place] [address] )     Dementia     Dementia     Disease of thyroid gland     GERD (gastroesophageal reflux disease)     Hyperlipidemia     Hyperlipidemia     Hypertension     MI (myocardial infarction) ([place] [address] )     Migraine     Renal disorder     Seizures ([place] [address] )     Stroke ([place] [address] )      Social History     Social History    Marital status:      Spouse name: N/A    Number of children: 3    Years of education: N/A     Occupational History    retired      Social History Main Topics    Smoking status: Former Smoker     Quit date: 9/7/2007    Smokeless tobacco: Never Used    Alcohol use No    Drug use: No    Sexual activity: Not on file     Other Topics Concern    Not on file     Social History Narrative    No narrative on file      Family History   Problem Relation Age of Onset    Heart disease Father     Parkinsonism Father     Macular degeneration Mother     Glaucoma Mother     Diabetes Brother     Heart disease Brother      Past Surgical History:   Procedure Laterality Date    APPENDECTOMY      BLADDER TUMOR EXCISION      BRAIN SURGERY      1998  clip right frontal lobe    CARDIAC SURGERY      COLONOSCOPY      CORONARY STENT PLACEMENT      5 stents    EYE SURGERY      MANDIBLE FRACTURE SURGERY      TONSILLECTOMY      TUBAL LIGATION      UTERINE FIBROID SURGERY         Current Outpatient Prescriptions:     acetaminophen (TYLENOL) 500 mg tablet, Take 500 mg by mouth every 6 (six) hours as needed for mild pain, Disp: , Rfl:     albuterol (2 5 mg/3 mL) 0  083 % nebulizer solution, Take 2 5 mg by nebulization every 6 (six) hours as needed for wheezing, Disp: , Rfl:     albuterol (PROVENTIL HFA,VENTOLIN HFA) 90 mcg/act inhaler, Inhale 2 puffs every 6 (six) hours as needed for wheezing, Disp: , Rfl:     allopurinol (ZYLOPRIM) 300 mg tablet, Take 300 mg by mouth every morning  , Disp: , Rfl:     atorvastatin (LIPITOR) 10 mg tablet, Take 10 mg by mouth daily, Disp: , Rfl:     buPROPion (WELLBUTRIN SR) 150 mg 12 hr tablet, Take 150 mg by mouth daily, Disp: , Rfl:     carBAMazepine (TEGretol) 100 mg chewable tablet, Chew 100 mg 3 (three) times a day, Disp: , Rfl:     Cholecalciferol (VITAMIN D) 2000 units CAPS, Take 1 capsule by mouth daily  , Disp: , Rfl:     Citric Uv-Anhnuvlwxae-Gm Carb (RENACIDIN) SOLN, Indications: 60ml to irrigate baca once a week, Disp: , Rfl:     clopidogrel (PLAVIX) 75 mg tablet, Take 1 tablet (75 mg total) by mouth daily, Disp: 90 tablet, Rfl: 0    diltiazem (CARDIZEM SR) 90 mg 12 hr capsule, Take 2 capsules (180 mg total) by mouth every 12 (twelve) hours, Disp: 30 capsule, Rfl: 0    divalproex sodium (DEPAKOTE ER) 250 mg 24 hr tablet, Take 1 tablet (250 mg total) by mouth daily, Disp: 30 tablet, Rfl: 0    fenofibrate micronized (LOFIBRA) 67 MG capsule, Take 67 mg by mouth every morning before breakfast, Disp: , Rfl:     ferrous sulfate 325 (65 Fe) mg tablet, Take 325 mg by mouth 2 (two) times a day, Disp: , Rfl:     fluticasone furoate-vilanterol (BREO ELLIPTA), Inhale 1 puff daily, Disp: , Rfl:     furosemide (LASIX) 40 mg tablet, Take 1 tablet (40 mg total) by mouth 2 (two) times a day, Disp: 180 tablet, Rfl: 3    Levothyroxine Sodium 88 MCG CAPS, Take 88 mcg by mouth every morning  , Disp: , Rfl:     metoprolol tartrate (LOPRESSOR) 50 mg tablet, Take 1 tablet (50 mg total) by mouth every 12 (twelve) hours, Disp: 60 tablet, Rfl: 0    potassium chloride (K-DUR,KLOR-CON) 20 mEq tablet, Take 1 tablet (20 mEq total) by mouth daily, Disp: 15 tablet, Rfl: 0    Sennosides-Docusate Sodium (SENNA PLUS PO), Take by mouth daily as needed, Disp: , Rfl:     warfarin (COUMADIN) 3 mg tablet, One tablet daily or as otherwise directed , Disp: 90 tablet, Rfl: 2    diphenhydrAMINE (BENADRYL) 25 mg tablet, TAKE 1 TABLET THE EVENING PRIOR AND 1 TABLET THE MORNING OF PROCEDURE (Patient not taking: Reported on 9/13/2018 ), Disp: 30 tablet, Rfl: 0    nitroglycerin (NITROSTAT) 0 4 mg SL tablet, Place 1 tablet (0 4 mg total) under the tongue every 5 (five) minutes as needed for chest pain (Patient not taking: Reported on 9/13/2018 ), Disp: 90 tablet, Rfl: 0  Allergies   Allergen Reactions    Spiriva Handihaler [Tiotropium Bromide Monohydrate] Swelling    Ramipril Facial Swelling    Penicillins      unsure     Vitals:    10/12/18 1033   BP: 120/54   BP Location: Left arm   Patient Position: Sitting   Cuff Size: Large   Pulse: (!) 50   SpO2: 97%   Weight: 84 5 kg (186 lb 3 2 oz)   Height: 5' 1" (1 549 m)       Labs:  Office Visit on 09/13/2018   Component Date Value    WBC 10/05/2018 5 35     RBC 10/05/2018 2 97*    Hemoglobin 10/05/2018 10 2*    Hematocrit 10/05/2018 32 1*    MCV 10/05/2018 108*    MCH 10/05/2018 34 3     MCHC 10/05/2018 31 8     RDW 10/05/2018 13 7     Platelets 22/27/1246 169     MPV 10/05/2018 11 9     Sodium 10/05/2018 147*    Potassium 10/05/2018 4 1     Chloride 10/05/2018 108     CO2 10/05/2018 35*    ANION GAP 10/05/2018 4     BUN 10/05/2018 38*    Creatinine 10/05/2018 1 89*    Glucose, Fasting 10/05/2018 122*    Calcium 10/05/2018 9 4     eGFR 10/05/2018 26    Admission on 09/07/2018, Discharged on 09/09/2018   Component Date Value    Sodium 09/07/2018 144     Potassium 09/07/2018 4 6     Chloride 09/07/2018 107     CO2 09/07/2018 31     ANION GAP 09/07/2018 6     BUN 09/07/2018 38*    Creatinine 09/07/2018 1 72*    Glucose 09/07/2018 189*    Glucose, Fasting 09/07/2018 189*    Calcium 09/07/2018 9 4     eGFR 09/07/2018 29     Protime 09/07/2018 15 1*    INR 09/07/2018 1 20*    Activated Clotting Time,* 09/07/2018 230*    Specimen Type 09/07/2018 ARTERIAL     Activated Clotting Time,* 09/07/2018 195*    Specimen Type 09/07/2018 ARTERIAL     Activated Clotting Time,* 09/07/2018 172*    Specimen Type 09/07/2018 ARTERIAL     Activated Clotting Time,* 09/07/2018 154*    Specimen Type 09/07/2018 ARTERIAL     ABO Grouping 09/07/2018 A     Rh Factor 09/07/2018 Positive     Antibody Screen 09/07/2018 Negative     Specimen Expiration Date 09/07/2018 [id number]     Protime 09/07/2018 16 3*    INR 09/07/2018 1 32*    WBC 09/07/2018 9 36     RBC 09/07/2018 3 10*    Hemoglobin 09/07/2018 10 4*    Hematocrit 09/07/2018 32 0*    MCV 09/07/2018 103*    MCH 09/07/2018 33 5     MCHC 09/07/2018 32 5     RDW 09/07/2018 13 3     Platelets 55/04/1134 154     MPV 09/07/2018 12 2     Sodium 09/07/2018 143     Potassium 09/07/2018 4 1     Chloride 09/07/2018 110*    CO2 09/07/2018 26     ANION GAP 09/07/2018 7     BUN 09/07/2018 36*    Creatinine 09/07/2018 1 53*    Glucose 09/07/2018 187*    Glucose, Fasting 09/07/2018 187*    Calcium 09/07/2018 8 8     eGFR 09/07/2018 34     Hemoglobin 09/07/2018 10 2*    Hematocrit 09/07/2018 32 0*    Hemoglobin 09/08/2018 9 5*    Hematocrit 09/08/2018 29 3*    POC Glucose 09/08/2018 134     WBC 09/08/2018 12 82*    RBC 09/08/2018 2 94*    Hemoglobin 09/08/2018 10 0*    Hematocrit 09/08/2018 31 5*    MCV 09/08/2018 107*    MCH 09/08/2018 34 0     MCHC 09/08/2018 31 7     RDW 09/08/2018 13 7     MPV 09/08/2018 12 3     Platelets 92/77/9039 153     nRBC 09/08/2018 0     Neutrophils Relative 09/08/2018 77*    Immat GRANS % 09/08/2018 1     Lymphocytes Relative 09/08/2018 14     Monocytes Relative 09/08/2018 8     Eosinophils Relative 09/08/2018 0     Basophils Relative 09/08/2018 0     Neutrophils Absolute 09/08/2018 9 84*    Immature Grans Absolute 09/08/2018 0 07     Lymphocytes Absolute 09/08/2018 1 84     Monocytes Absolute 09/08/2018 1 06     Eosinophils Absolute 09/08/2018 0 00     Basophils Absolute 09/08/2018 0 01     Sodium 09/08/2018 146*    Potassium 09/08/2018 4 0     Chloride 09/08/2018 112*    CO2 09/08/2018 28     ANION GAP 09/08/2018 6     BUN 09/08/2018 35*    Creatinine 09/08/2018 1 57*    Glucose 09/08/2018 130     Glucose, Fasting 09/08/2018 130*    Calcium 09/08/2018 9 4     eGFR 09/08/2018 33     Calcium, Ionized 09/08/2018 1 21     Magnesium 09/08/2018 2 5     POC Glucose 09/08/2018 138     Hemoglobin 09/08/2018 9 5*    Hematocrit 09/08/2018 30 0*    POC Glucose 09/08/2018 98     POC Glucose 09/08/2018 100     Hemoglobin 09/08/2018 9 2*    Hematocrit 09/08/2018 29 2*    Hemoglobin 09/09/2018 8 0*    Hematocrit 09/09/2018 25 8*    POC Glucose 09/09/2018 131     WBC 09/09/2018 7 87     RBC 09/09/2018 2 46*    Hemoglobin 09/09/2018 8 4*    Hematocrit 09/09/2018 26 8*    MCV 09/09/2018 109*    MCH 09/09/2018 34 1     MCHC 09/09/2018 31 3*    RDW 09/09/2018 13 8     MPV 09/09/2018 12 7     Platelets 83/00/2958 118*    nRBC 09/09/2018 0     Neutrophils Relative 09/09/2018 60     Immat GRANS % 09/09/2018 1     Lymphocytes Relative 09/09/2018 28     Monocytes Relative 09/09/2018 8     Eosinophils Relative 09/09/2018 2     Basophils Relative 09/09/2018 1     Neutrophils Absolute 09/09/2018 4 77     Immature Grans Absolute 09/09/2018 0 06     Lymphocytes Absolute 09/09/2018 2 17     Monocytes Absolute 09/09/2018 0 66     Eosinophils Absolute 09/09/2018 0 17     Basophils Absolute 09/09/2018 0 04     Sodium 09/09/2018 146*    Potassium 09/09/2018 4 3     Chloride 09/09/2018 112*    CO2 09/09/2018 30     ANION GAP 09/09/2018 4     BUN 09/09/2018 33*    Creatinine 09/09/2018 1 46*    Glucose 09/09/2018 106     Calcium 09/09/2018 8 6     eGFR 09/09/2018 36     Magnesium 09/09/2018 2 1     POC Glucose 09/09/2018 169*    Hemoglobin 09/09/2018 9 0*    Hematocrit 09/09/2018 29 2*    Protime 09/09/2018 15 0*    INR 09/09/2018 1 19*    POC Glucose 09/09/2018 198*   Lab on 08/31/2018   Component Date Value    Ventricular Rate 09/03/2018 110     Atrial Rate 09/03/2018 110     MT Interval 09/03/2018 136     QRSD Interval 09/03/2018 70     QT Interval 09/03/2018 318     QTC Interval 09/03/2018 430     P Axis 09/03/2018 72     QRS Axis 09/03/2018 -1     T Wave Axis 09/03/2018 17     Ventricular Rate 09/07/2018 54     Atrial Rate 09/07/2018 54     MT Interval 09/07/2018 194     QRSD Interval 09/07/2018 106     QT Interval 09/07/2018 480     QTC Interval 09/07/2018 455     P Axis 09/07/2018 64     QRS Axis 09/07/2018 0     T Wave Axis 09/07/2018 20    Office Visit on 08/24/2018   Component Date Value    WBC 08/31/2018 5 65     RBC 08/31/2018 3 50*    Hemoglobin 08/31/2018 11 8     Hematocrit 08/31/2018 36 6     MCV 08/31/2018 105*    MCH 08/31/2018 33 7     MCHC 08/31/2018 32 2     RDW 08/31/2018 13 2     Platelets 07/21/0876 163     MPV 08/31/2018 12 3    Anticoag visit on 08/16/2018   Component Date Value    INR 08/15/2018 2 20*     Imaging: No results found  Review of Systems:  Review of Systems   Constitutional: Negative for diaphoresis and fatigue  HENT: Negative for congestion and facial swelling  Eyes: Negative for photophobia and visual disturbance  Respiratory: Negative for chest tightness and shortness of breath  Cardiovascular: Negative for chest pain, palpitations and leg swelling  Right lower extremity claudication   Gastrointestinal: Negative for abdominal pain and nausea  Endocrine: Negative for cold intolerance and heat intolerance  Musculoskeletal: Negative for arthralgias and myalgias  Skin: Negative for pallor and rash  Neurological: Negative for dizziness and tremors     Psychiatric/Behavioral: Negative for sleep disturbance  The patient is not nervous/anxious  Physical Exam:  Physical Exam   Constitutional: She is oriented to person, place, and time  She appears well-developed and well-nourished  HENT:   Head: Normocephalic and atraumatic  Eyes: Pupils are equal, round, and reactive to light  Conjunctivae and EOM are normal    Neck: Normal range of motion  Neck supple  No JVD present  No thyromegaly present  Cardiovascular: Normal rate, regular rhythm, normal heart sounds and intact distal pulses  Exam reveals no gallop and no friction rub  No murmur heard  Trace bilateral pitting edema   varicose veins   Pulmonary/Chest: Effort normal and breath sounds normal  No respiratory distress  She has no wheezes  She has no rales  Abdominal: Soft  Bowel sounds are normal  She exhibits no distension  There is no tenderness  There is no rebound and no guarding  Musculoskeletal: Normal range of motion  She exhibits no edema  Neurological: She is alert and oriented to person, place, and time  She has normal reflexes  No cranial nerve deficit  Skin: Skin is warm and dry  Psychiatric: She has a normal mood and affect  Discussion/Summary:  1   Right groin hematoma:  - resolved  - hemoglobin is increased  - no pseudoaneurysm      2   PAD, claudication:  Patient continues to have right leg claudication   left leg claudication has resolved  Plavix for 3 months    I will reduce dose of Lasix and repeat BMP in 2 weeks  If the serum creatinine improves will consider doing a right SFA and iliac intervention    Patient is at risk for contrast induced nephropathy      3   Chronic diastolic Congestive heart failure:  Currently euvolemic, last EF 55-65%   Continue Lasix   Low-salt diet, daily weights      4   Atrial fibrillation/flutter:  Continue Cardizem and Lopressor for rate control   Coumadin held given hematoma      5   Hypertension:  Stable, continue present regimen      6   Hyperlipidemia:  Continue statin  7   Chronic kidney disease, possible contrast induced nephropathy  Reduce the dose of Lasix    Patient has a follow-up with her nephrologist

## 2018-10-12 NOTE — TELEPHONE ENCOUNTER
All we can do is document her underlying dementia  She would need a follow up appt, we can obtain the compliance report and document all of that  She should be due for a compliance appt anyway

## 2018-10-25 ENCOUNTER — OFFICE VISIT (OUTPATIENT)
Dept: PULMONOLOGY | Facility: CLINIC | Age: 73
End: 2018-10-25
Payer: MEDICARE

## 2018-10-25 VITALS
HEIGHT: 61 IN | WEIGHT: 185 LBS | OXYGEN SATURATION: 92 % | BODY MASS INDEX: 34.93 KG/M2 | DIASTOLIC BLOOD PRESSURE: 80 MMHG | SYSTOLIC BLOOD PRESSURE: 124 MMHG | HEART RATE: 51 BPM

## 2018-10-25 DIAGNOSIS — F02.81 DEMENTIA ASSOCIATED WITH OTHER UNDERLYING DISEASE WITH BEHAVIORAL DISTURBANCE (HCC): ICD-10-CM

## 2018-10-25 DIAGNOSIS — G47.33 OSA (OBSTRUCTIVE SLEEP APNEA): Primary | ICD-10-CM

## 2018-10-25 DIAGNOSIS — J44.9 CHRONIC OBSTRUCTIVE PULMONARY DISEASE, UNSPECIFIED COPD TYPE (HCC): Chronic | ICD-10-CM

## 2018-10-25 DIAGNOSIS — J96.11 CHRONIC HYPOXEMIC RESPIRATORY FAILURE (HCC): ICD-10-CM

## 2018-10-25 PROCEDURE — 99214 OFFICE O/P EST MOD 30 MIN: CPT | Performed by: PHYSICIAN ASSISTANT

## 2018-10-25 NOTE — PROGRESS NOTES
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         Plan:     Patient is here today for follow-up with her daughter  Due to her dementia she has been having a hard time with the CPAP, in the beginning she had been using the mask and would take it off at some point during the night  She more recently has been refusing to put the mask on at all after a hospitalization during which she did not use her CPAP  I reiterated to the patient the importance of using the CPAP for cardiac protection though given her underlying dementia I am not sure that she understands the importance  Will send the order over to [facility] to have them do a mask fit appointment and possibly try nasal pillows or a nasal mask to see if she tolerates this better  We are waiting download compliance from her CPAP  Will make any changes necessary once we receive that  She will continue with oxygen 2 L with exertion and at night with or without the CPAP  If we reach a point where she is able to tolerate the CPAP, will check an overnight pulse ox to see if she still needs the oxygen with the CPAP  She had PFTs done which showed restriction likely secondary to obesity  She has been on Breo, will continue it through the winter given her daughter reports she usually gets bronchitis and has increased symptoms in the winter  At the time of this office visit patient met her compliance requirement with the CPAP  Subjective:     Patient ID: Collette Glad is a 68 y o  female  Chief Complaint:  Patient is a 25-year-old female former smoker with past medical history of dementia, AFib, CAD, CHF, CKD, COPD, stroke, HLD    She was hospitalized in May for acute hypoxemic respiratory failure thought to be related to pulmonary edema and atrial flutter  She was also diagnosed with obstructive sleep apnea and was started on CPAP  She is here today for follow-up  Her daughter reports that she is having trouble with the CPAP  She is refusing to put the CPAP on at night, due to her dementia she is unable to understand the importance of the CPAP  Review of Systems   Constitutional: Negative  HENT: Negative  Respiratory: Negative  Cardiovascular: Negative  Gastrointestinal: Negative  Genitourinary: Negative  Musculoskeletal: Negative  Skin: Negative  Allergic/Immunologic: Negative  Neurological: Negative  Psychiatric/Behavioral: Positive for agitation (Due to dementia)  Objective:  /80   Pulse (!) 51   Ht 5' 1" (1 549 m)   Wt 83 9 kg (185 lb)   SpO2 92%   BMI 34 96 kg/m²     Physical Exam   Constitutional: She is oriented to person, place, and time  She appears well-developed and well-nourished  HENT:   Mouth/Throat: Oropharynx is clear and moist    Crowded oropharyngeal airway   Eyes: Pupils are equal, round, and reactive to light  Neck:   Short and wide neck   Cardiovascular: Normal rate and regular rhythm  No murmur heard  Pulmonary/Chest: Effort normal and breath sounds normal  No respiratory distress  She has no decreased breath sounds  She has no wheezes  She has no rhonchi  She has no rales  Abdominal: Soft  There is no tenderness  Musculoskeletal: Normal range of motion  She exhibits no edema  Neurological: She is alert and oriented to person, place, and time  Skin: Skin is warm and dry  Psychiatric: She has a normal mood and affect

## 2018-11-01 DIAGNOSIS — G47.33 OSA (OBSTRUCTIVE SLEEP APNEA): Primary | ICD-10-CM

## 2018-11-08 ENCOUNTER — OFFICE VISIT (OUTPATIENT)
Dept: CARDIOLOGY CLINIC | Facility: CLINIC | Age: 73
End: 2018-11-08
Payer: MEDICARE

## 2018-11-08 VITALS
BODY MASS INDEX: 34.55 KG/M2 | OXYGEN SATURATION: 92 % | WEIGHT: 183 LBS | HEIGHT: 61 IN | HEART RATE: 57 BPM | DIASTOLIC BLOOD PRESSURE: 70 MMHG | SYSTOLIC BLOOD PRESSURE: 140 MMHG

## 2018-11-08 DIAGNOSIS — I70.211 INTERMITTENT CLAUDICATION OF RIGHT LOWER EXTREMITY DUE TO ATHEROSCLEROSIS (HCC): ICD-10-CM

## 2018-11-08 DIAGNOSIS — E78.2 MIXED HYPERLIPIDEMIA: ICD-10-CM

## 2018-11-08 DIAGNOSIS — I48.0 PAROXYSMAL ATRIAL FIBRILLATION (HCC): Primary | ICD-10-CM

## 2018-11-08 DIAGNOSIS — I10 ESSENTIAL HYPERTENSION: ICD-10-CM

## 2018-11-08 DIAGNOSIS — R60.0 BILATERAL LEG EDEMA: ICD-10-CM

## 2018-11-08 PROBLEM — I70.213 INTERMITTENT CLAUDICATION OF BOTH LOWER EXTREMITIES DUE TO ATHEROSCLEROSIS (HCC): Status: RESOLVED | Noted: 2018-05-04 | Resolved: 2018-11-08

## 2018-11-08 PROCEDURE — 99214 OFFICE O/P EST MOD 30 MIN: CPT | Performed by: INTERNAL MEDICINE

## 2018-11-08 NOTE — PROGRESS NOTES
Cardiology Consultation     Juan Manuel Rosas  [id number]  1945  [address] [facility] [address]    C/c:FOLLOW-UP OF PERIPHERAL ARTERY DISEASE AND LEFT SFA ANGIOPLASTY      HPI: 71-year-old female patient with past medical history of coronary disease, peripheral artery disease status post left SFA angioplasty recently complicated by right groin hematoma, hypertension hyperlipidemia, CKD, AFib/flutter is here for follow-up    Patient continues to have significant right lower extremity claudication with minimal exertion which she describes as like tightness on exertion    Patient still has significant lesions in the right external iliac artery and right femoral artery which are not intervened   Right groin hematoma is resolved and hemoglobin has remained stable  She has mild lower extremity edema which is chronic  She denies having any chest pain, shortness of breath, orthopnea, paroxysmal nocturnal dyspnea, significant weight gain    Patient is also not able to wear CPAP at night as it is very uncomfortable        SERUM CREATININE HAS IMPROVED SIGNIFICANTLY TO 1 37    Patient Active Problem List   Diagnosis    HTN (hypertension)    [facility]    Acute on chronic respiratory failure (HCC)    Atrial flutter (HCC)    Back pain    Dementia    Hyperlipidemia    UTI (urinary tract infection)    Seizure disorder ([facility] )    RSV infection    Ambulatory dysfunction    Hypernatremia    Stage 3 chronic kidney disease ([facility] )    Bilateral leg edema    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    JESENIA (obstructive sleep apnea)    Intermittent claudication of both lower extremities due to atherosclerosis (HCC)    Atrial fibrillation (HCC)    SOB (shortness of breath)    Chronic indwelling Davis catheter    Bradycardia    Chronic hypoxemic respiratory failure ([place] [address] )    Essential hypertension    Peripheral artery disease (HCC)    Hematoma    Contrast dye induced nephropathy     Past Medical History:   Diagnosis Date    Atrial fibrillation ([place] [address] )     Brain aneurysm     CAD (coronary artery disease)     Cardiac disease     had stents 12 years ago in [facility]    CHF (congestive heart failure) ([place] [address] )     CKD (chronic kidney disease)     [facility]     Dementia     Dementia     Disease of thyroid gland     GERD (gastroesophageal reflux disease)     Hyperlipidemia     Hyperlipidemia     Hypertension     MI (myocardial infarction) ([place] [address] )     Migraine     Renal disorder     Seizures ([place] [address] )     Stroke ([place] [address] )      Social History     Social History    Marital status:      Spouse name: N/A    Number of children: 3    Years of education: N/A     Occupational History    retired      Social History Main Topics    Smoking status: Former Smoker     Quit date: 9/7/2007    Smokeless tobacco: Never Used    Alcohol use No    Drug use: No    Sexual activity: Not on file     Other Topics Concern    Not on file     Social History Narrative    No narrative on file      Family History   Problem Relation Age of Onset    Heart disease Father     Parkinsonism Father     Macular degeneration Mother     Glaucoma Mother     Diabetes Brother     Heart disease Brother      Past Surgical History:   Procedure Laterality Date    APPENDECTOMY      BLADDER TUMOR EXCISION      BRAIN SURGERY      1998  clip right frontal lobe    CARDIAC SURGERY      COLONOSCOPY      CORONARY STENT PLACEMENT      5 stents    EYE SURGERY      MANDIBLE FRACTURE SURGERY      TONSILLECTOMY      TUBAL LIGATION      UTERINE FIBROID SURGERY         Current Outpatient Prescriptions:     albuterol (2 5 mg/3 mL) 0 083 % nebulizer solution, Take 2 5 mg by nebulization every 6 (six) hours as needed for wheezing, Disp: , Rfl:     albuterol (PROVENTIL HFA,VENTOLIN HFA) 90 mcg/act inhaler, Inhale 2 puffs every 6 (six) hours as needed for wheezing, Disp: , Rfl:     allopurinol (ZYLOPRIM) 300 mg tablet, Take 300 mg by mouth every morning  , Disp: , Rfl:     atorvastatin (LIPITOR) 10 mg tablet, Take 10 mg by mouth daily, Disp: , Rfl:     buPROPion (WELLBUTRIN SR) 150 mg 12 hr tablet, Take 150 mg by mouth daily, Disp: , Rfl:     carBAMazepine (TEGretol) 100 mg chewable tablet, Chew 100 mg 3 (three) times a day, Disp: , Rfl:     Cholecalciferol (VITAMIN D) 2000 units CAPS, Take 1 capsule by mouth daily  , Disp: , Rfl:     Citric Vi-Rvjopsnvlbs-Wf Carb (RENACIDIN) SOLN, Indications: 60ml to irrigate baca once a week, Disp: , Rfl:     clopidogrel (PLAVIX) 75 mg tablet, Take 1 tablet (75 mg total) by mouth daily, Disp: 90 tablet, Rfl: 0    diltiazem (CARDIZEM SR) 90 mg 12 hr capsule, Take 2 capsules (180 mg total) by mouth every 12 (twelve) hours, Disp: 30 capsule, Rfl: 0    divalproex sodium (DEPAKOTE ER) 250 mg 24 hr tablet, Take 1 tablet (250 mg total) by mouth daily, Disp: 30 tablet, Rfl: 0    fenofibrate micronized (LOFIBRA) 67 MG capsule, Take 67 mg by mouth every morning before breakfast, Disp: , Rfl:     ferrous sulfate 325 (65 Fe) mg tablet, Take 325 mg by mouth 2 (two) times a day, Disp: , Rfl:     fluticasone furoate-vilanterol (BREO ELLIPTA), Inhale 1 puff daily, Disp: , Rfl:     furosemide (LASIX) 40 mg tablet, Take 1 tablet (40 mg total) by mouth daily, Disp: 90 tablet, Rfl: 3    Levothyroxine Sodium 88 MCG CAPS, Take 88 mcg by mouth every morning  , Disp: , Rfl:     metoprolol tartrate (LOPRESSOR) 50 mg tablet, Take 1 tablet (50 mg total) by mouth every 12 (twelve) hours, Disp: 60 tablet, Rfl: 0    OXYGEN-HELIUM IN, Inhale, Disp: , Rfl:     potassium chloride (K-DUR,KLOR-CON) 20 mEq tablet, Take 1 tablet (20 mEq total) by mouth daily, Disp: 15 tablet, Rfl: 0    Probiotic Product (ACIDOPHILUS/BIFIDUS PO), Take by mouth, Disp: , Rfl:     Sennosides-Docusate Sodium (SENNA PLUS PO), Take by mouth daily as needed, Disp: , Rfl:     warfarin (COUMADIN) 3 mg tablet, One tablet daily or as otherwise directed , Disp: 90 tablet, Rfl: 2    acetaminophen (TYLENOL) 500 mg tablet, Take 500 mg by mouth every 6 (six) hours as needed for mild pain, Disp: , Rfl:     diphenhydrAMINE (BENADRYL) 25 mg tablet, TAKE 1 TABLET THE EVENING PRIOR AND 1 TABLET THE MORNING OF PROCEDURE, Disp: 30 tablet, Rfl: 0    nitroglycerin (NITROSTAT) 0 4 mg SL tablet, Place 1 tablet (0 4 mg total) under the tongue every 5 (five) minutes as needed for chest pain, Disp: 90 tablet, Rfl: 0  Allergies   Allergen Reactions    Spiriva Handihaler [Tiotropium Bromide Monohydrate] Swelling    Ramipril Facial Swelling    Penicillins      unsure     Vitals:    11/08/18 1326   BP: 140/70   BP Location: Left arm   Patient Position: Sitting   Cuff Size: Adult   Pulse: 57   SpO2: 92%   Weight: 83 kg (183 lb)   Height: 5' 1" (1 549 m)       Labs:  Office Visit on 09/13/2018   Component Date Value    WBC 10/05/2018 5 35     RBC 10/05/2018 2 97*    Hemoglobin 10/05/2018 10 2*    Hematocrit 10/05/2018 32 1*    MCV 10/05/2018 108*    MCH 10/05/2018 34 3     MCHC 10/05/2018 31 8     RDW 10/05/2018 13 7     Platelets 50/08/8201 169     MPV 10/05/2018 11 9     Sodium 10/05/2018 147*    Potassium 10/05/2018 4 1     Chloride 10/05/2018 108     CO2 10/05/2018 35*    ANION GAP 10/05/2018 4     BUN 10/05/2018 38*    Creatinine 10/05/2018 1 89*    Glucose, Fasting 10/05/2018 122*    Calcium 10/05/2018 9 4     eGFR 10/05/2018 26    Admission on 09/07/2018, Discharged on 09/09/2018   Component Date Value    Sodium 09/07/2018 144     Potassium 09/07/2018 4 6     Chloride 09/07/2018 107     CO2 09/07/2018 31     ANION GAP 09/07/2018 6     BUN 09/07/2018 38*    Creatinine 09/07/2018 1 72*    Glucose 09/07/2018 189*    Glucose, Fasting 09/07/2018 189*    Calcium 09/07/2018 9 4     eGFR 09/07/2018 29     Protime 09/07/2018 15 1*    INR 09/07/2018 1 20*    Activated Clotting Time,* 09/07/2018 230*    Specimen Type 09/07/2018 ARTERIAL     Activated Clotting Time,* 09/07/2018 195*    Specimen Type 09/07/2018 ARTERIAL     Activated Clotting Time,* 09/07/2018 172*    Specimen Type 09/07/2018 ARTERIAL     Activated Clotting Time,* 09/07/2018 154*    Specimen Type 09/07/2018 ARTERIAL     ABO Grouping 09/07/2018 A     Rh Factor 09/07/2018 Positive     Antibody Screen 09/07/2018 Negative     Specimen Expiration Date 09/07/2018 [id number]     Protime 09/07/2018 16 3*    INR 09/07/2018 1 32*    WBC 09/07/2018 9 36     RBC 09/07/2018 3 10*    Hemoglobin 09/07/2018 10 4*    Hematocrit 09/07/2018 32 0*    MCV 09/07/2018 103*    MCH 09/07/2018 33 5     MCHC 09/07/2018 32 5     RDW 09/07/2018 13 3     Platelets 65/90/3539 154     MPV 09/07/2018 12 2     Sodium 09/07/2018 143     Potassium 09/07/2018 4 1     Chloride 09/07/2018 110*    CO2 09/07/2018 26     ANION GAP 09/07/2018 7     BUN 09/07/2018 36*    Creatinine 09/07/2018 1 53*    Glucose 09/07/2018 187*    Glucose, Fasting 09/07/2018 187*    Calcium 09/07/2018 8 8     eGFR 09/07/2018 34     Hemoglobin 09/07/2018 10 2*    Hematocrit 09/07/2018 32 0*    Hemoglobin 09/08/2018 9 5*    Hematocrit 09/08/2018 29 3*    POC Glucose 09/08/2018 134     WBC 09/08/2018 12 82*    RBC 09/08/2018 2 94*    Hemoglobin 09/08/2018 10 0*    Hematocrit 09/08/2018 31 5*    MCV 09/08/2018 107*    MCH 09/08/2018 34 0     MCHC 09/08/2018 31 7     RDW 09/08/2018 13 7     MPV 09/08/2018 12 3     Platelets 72/10/6406 153     nRBC 09/08/2018 0     Neutrophils Relative 09/08/2018 77*    Immat GRANS % 09/08/2018 1     Lymphocytes Relative 09/08/2018 14     Monocytes Relative 09/08/2018 8     Eosinophils Relative 09/08/2018 0     Basophils Relative 09/08/2018 0     Neutrophils Absolute 09/08/2018 9 84*  Immature Grans Absolute 09/08/2018 0 07     Lymphocytes Absolute 09/08/2018 1 84     Monocytes Absolute 09/08/2018 1 06     Eosinophils Absolute 09/08/2018 0 00     Basophils Absolute 09/08/2018 0 01     Sodium 09/08/2018 146*    Potassium 09/08/2018 4 0     Chloride 09/08/2018 112*    CO2 09/08/2018 28     ANION GAP 09/08/2018 6     BUN 09/08/2018 35*    Creatinine 09/08/2018 1 57*    Glucose 09/08/2018 130     Glucose, Fasting 09/08/2018 130*    Calcium 09/08/2018 9 4     eGFR 09/08/2018 33     Calcium, Ionized 09/08/2018 1 21     Magnesium 09/08/2018 2 5     POC Glucose 09/08/2018 138     Hemoglobin 09/08/2018 9 5*    Hematocrit 09/08/2018 30 0*    POC Glucose 09/08/2018 98     POC Glucose 09/08/2018 100     Hemoglobin 09/08/2018 9 2*    Hematocrit 09/08/2018 29 2*    Hemoglobin 09/09/2018 8 0*    Hematocrit 09/09/2018 25 8*    POC Glucose 09/09/2018 131     WBC 09/09/2018 7 87     RBC 09/09/2018 2 46*    Hemoglobin 09/09/2018 8 4*    Hematocrit 09/09/2018 26 8*    MCV 09/09/2018 109*    MCH 09/09/2018 34 1     MCHC 09/09/2018 31 3*    RDW 09/09/2018 13 8     MPV 09/09/2018 12 7     Platelets 14/06/1470 118*    nRBC 09/09/2018 0     Neutrophils Relative 09/09/2018 60     Immat GRANS % 09/09/2018 1     Lymphocytes Relative 09/09/2018 28     Monocytes Relative 09/09/2018 8     Eosinophils Relative 09/09/2018 2     Basophils Relative 09/09/2018 1     Neutrophils Absolute 09/09/2018 4 77     Immature Grans Absolute 09/09/2018 0 06     Lymphocytes Absolute 09/09/2018 2 17     Monocytes Absolute 09/09/2018 0 66     Eosinophils Absolute 09/09/2018 0 17     Basophils Absolute 09/09/2018 0 04     Sodium 09/09/2018 146*    Potassium 09/09/2018 4 3     Chloride 09/09/2018 112*    CO2 09/09/2018 30     ANION GAP 09/09/2018 4     BUN 09/09/2018 33*    Creatinine 09/09/2018 1 46*    Glucose 09/09/2018 106     Calcium 09/09/2018 8 6     eGFR 09/09/2018 36     Magnesium 09/09/2018 2 1     POC Glucose 09/09/2018 169*    Hemoglobin 09/09/2018 9 0*    Hematocrit 09/09/2018 29 2*    Protime 09/09/2018 15 0*    INR 09/09/2018 1 19*    POC Glucose 09/09/2018 198*     Imaging: No results found  Review of Systems:  Review of Systems   Constitutional: Negative for diaphoresis and fatigue  HENT: Negative for congestion and facial swelling  Eyes: Negative for photophobia and visual disturbance  Respiratory: Negative for chest tightness and shortness of breath  Cardiovascular: Negative for chest pain, palpitations and leg swelling  Right lower extremity claudication   Gastrointestinal: Negative for abdominal pain and nausea  Endocrine: Negative for cold intolerance and heat intolerance  Musculoskeletal: Negative for arthralgias and myalgias  Skin: Negative for pallor and rash  Neurological: Negative for dizziness and tremors  Psychiatric/Behavioral: Negative for sleep disturbance  The patient is not nervous/anxious  Physical Exam:  Physical Exam   Constitutional: She is oriented to person, place, and time  She appears well-developed and well-nourished  HENT:   Head: Normocephalic and atraumatic  Eyes: Pupils are equal, round, and reactive to light  Conjunctivae and EOM are normal    Neck: Normal range of motion  Neck supple  No JVD present  No thyromegaly present  Cardiovascular: Normal rate, regular rhythm, S1 normal, S2 normal, normal heart sounds and intact distal pulses  Exam reveals no gallop and no friction rub  No murmur heard  Pulses:       Carotid pulses are 2+ on the right side, and 2+ on the left side  1+ edema bilaterally   Pulmonary/Chest: Effort normal and breath sounds normal  No respiratory distress  She has no wheezes  She has no rales  Abdominal: Soft  Bowel sounds are normal  She exhibits no distension  There is no tenderness  There is no rebound and no guarding  Musculoskeletal: Normal range of motion   She exhibits no edema  Neurological: She is alert and oriented to person, place, and time  She has normal reflexes  No cranial nerve deficit  Skin: Skin is warm and dry  Psychiatric: She has a normal mood and affect  Discussion/Summary:  1   Right groin hematoma:  - resolved  - hemoglobin is increased  - no pseudoaneurysm      2   PAD, claudication:  Patient continues to have right leg claudication   left leg claudication has resolved  CONTINUE PLAVIX  SERUM CREATININE IS SIGNIFICANTLY IMPROVED  I DISCUSSED THE RISKS AND BENEFITS OF DOING RIGHT ILIAC AND FEMORAL ARTERY INTERVENTION FOR CLAUDICATION INCLUDING CONTRAST INDUCED NEPHROPATHY, BLEEDING  After understanding the risks and benefits, patient and family wants to proceed with intervention  Verbal consent was obtained  Will bring the patient will early for IV hydration and aggressively hydrate after the procedure      3   Chronic diastolic Congestive heart failure:  Currently euvolemic, last EF 55-65%   Continue Lasix   Low-salt diet, daily weights      4   Atrial fibrillation/flutter:  Continue Cardizem and Lopressor for rate control   Coumadin held given hematoma      5   Hypertension:  Stable, continue present regimen      6   Hyperlipidemia:  Continue statin      7    chronic kidney disease, serum creatinine is improved      Follow-up with me 1 week after intervention

## 2018-11-12 DIAGNOSIS — T50.8X5A CONTRAST DYE INDUCED NEPHROPATHY: Primary | ICD-10-CM

## 2018-11-12 DIAGNOSIS — N14.1 CONTRAST DYE INDUCED NEPHROPATHY: Primary | ICD-10-CM

## 2018-11-12 DIAGNOSIS — R94.39 ABNORMAL STRESS TEST: ICD-10-CM

## 2018-11-12 RX ORDER — PREDNISONE 20 MG/1
TABLET ORAL
Qty: 6 TABLET | Refills: 0 | Status: SHIPPED | OUTPATIENT
Start: 2018-11-12 | End: 2018-12-14 | Stop reason: HOSPADM

## 2018-11-12 RX ORDER — DIPHENHYDRAMINE HCL 25 MG
TABLET ORAL
Qty: 2 TABLET | Refills: 0 | Status: SHIPPED | OUTPATIENT
Start: 2018-11-12 | End: 2018-12-14 | Stop reason: HOSPADM

## 2018-11-12 RX ORDER — FAMOTIDINE 20 MG/1
TABLET, FILM COATED ORAL
Qty: 2 TABLET | Refills: 0 | Status: SHIPPED | OUTPATIENT
Start: 2018-11-12 | End: 2018-12-14 | Stop reason: HOSPADM

## 2018-11-12 NOTE — TELEPHONE ENCOUNTER
Daughter is aware of scheduling directions  Allergy dye prep pending, paperwork faxed to the hospital for scheduling

## 2018-11-30 ENCOUNTER — APPOINTMENT (OUTPATIENT)
Dept: LAB | Facility: HOSPITAL | Age: 73
End: 2018-11-30
Attending: INTERNAL MEDICINE
Payer: MEDICARE

## 2018-11-30 LAB
ANION GAP SERPL CALCULATED.3IONS-SCNC: 10 MMOL/L (ref 4–13)
BASOPHILS # BLD AUTO: 0.02 THOUSANDS/ΜL (ref 0–0.1)
BASOPHILS NFR BLD AUTO: 0 % (ref 0–1)
BUN SERPL-MCNC: 33 MG/DL (ref 5–25)
CALCIUM SERPL-MCNC: 9.9 MG/DL (ref 8.3–10.1)
CHLORIDE SERPL-SCNC: 108 MMOL/L (ref 100–108)
CO2 SERPL-SCNC: 29 MMOL/L (ref 21–32)
CREAT SERPL-MCNC: 1.5 MG/DL (ref 0.6–1.3)
EOSINOPHIL # BLD AUTO: 0.2 THOUSAND/ΜL (ref 0–0.61)
EOSINOPHIL NFR BLD AUTO: 4 % (ref 0–6)
ERYTHROCYTE [DISTWIDTH] IN BLOOD BY AUTOMATED COUNT: 13.1 % (ref 11.6–15.1)
GFR SERPL CREATININE-BSD FRML MDRD: 35 ML/MIN/1.73SQ M
GLUCOSE SERPL-MCNC: 102 MG/DL (ref 65–140)
HCT VFR BLD AUTO: 40.4 % (ref 34.8–46.1)
HGB BLD-MCNC: 12.7 G/DL (ref 11.5–15.4)
IMM GRANULOCYTES # BLD AUTO: 0.02 THOUSAND/UL (ref 0–0.2)
IMM GRANULOCYTES NFR BLD AUTO: 0 % (ref 0–2)
LYMPHOCYTES # BLD AUTO: 1.62 THOUSANDS/ΜL (ref 0.6–4.47)
LYMPHOCYTES NFR BLD AUTO: 35 % (ref 14–44)
MCH RBC QN AUTO: 33.4 PG (ref 26.8–34.3)
MCHC RBC AUTO-ENTMCNC: 31.4 G/DL (ref 31.4–37.4)
MCV RBC AUTO: 106 FL (ref 82–98)
MONOCYTES # BLD AUTO: 0.46 THOUSAND/ΜL (ref 0.17–1.22)
MONOCYTES NFR BLD AUTO: 10 % (ref 4–12)
NEUTROPHILS # BLD AUTO: 2.31 THOUSANDS/ΜL (ref 1.85–7.62)
NEUTS SEG NFR BLD AUTO: 51 % (ref 43–75)
NRBC BLD AUTO-RTO: 0 /100 WBCS
PLATELET # BLD AUTO: 138 THOUSANDS/UL (ref 149–390)
PMV BLD AUTO: 12.1 FL (ref 8.9–12.7)
POTASSIUM SERPL-SCNC: 4.4 MMOL/L (ref 3.5–5.3)
RBC # BLD AUTO: 3.8 MILLION/UL (ref 3.81–5.12)
SODIUM SERPL-SCNC: 147 MMOL/L (ref 136–145)
WBC # BLD AUTO: 4.63 THOUSAND/UL (ref 4.31–10.16)

## 2018-11-30 PROCEDURE — 85025 COMPLETE CBC W/AUTO DIFF WBC: CPT | Performed by: INTERNAL MEDICINE

## 2018-11-30 PROCEDURE — 80048 BASIC METABOLIC PNL TOTAL CA: CPT | Performed by: INTERNAL MEDICINE

## 2018-11-30 PROCEDURE — 36415 COLL VENOUS BLD VENIPUNCTURE: CPT | Performed by: INTERNAL MEDICINE

## 2018-12-04 ENCOUNTER — TELEPHONE (OUTPATIENT)
Dept: SURGERY | Facility: HOSPITAL | Age: 73
End: 2018-12-04

## 2018-12-05 ENCOUNTER — TELEPHONE (OUTPATIENT)
Dept: NON INVASIVE DIAGNOSTICS | Facility: HOSPITAL | Age: 73
End: 2018-12-05

## 2018-12-05 RX ORDER — SODIUM CHLORIDE 9 MG/ML
125 INJECTION, SOLUTION INTRAVENOUS CONTINUOUS
Status: CANCELLED | OUTPATIENT
Start: 2018-12-05

## 2018-12-06 ENCOUNTER — TELEPHONE (OUTPATIENT)
Dept: SURGERY | Facility: HOSPITAL | Age: 73
End: 2018-12-06

## 2018-12-07 ENCOUNTER — ANESTHESIA (INPATIENT)
Dept: PERIOP | Facility: HOSPITAL | Age: 73
DRG: 252 | End: 2018-12-07
Payer: MEDICARE

## 2018-12-07 ENCOUNTER — HOSPITAL ENCOUNTER (OUTPATIENT)
Dept: INTERVENTIONAL RADIOLOGY/VASCULAR | Facility: HOSPITAL | Age: 73
Discharge: HOME | End: 2018-12-07
Attending: INTERNAL MEDICINE | Admitting: INTERNAL MEDICINE
Payer: MEDICARE

## 2018-12-07 ENCOUNTER — ANESTHESIA EVENT (INPATIENT)
Dept: PERIOP | Facility: HOSPITAL | Age: 73
DRG: 252 | End: 2018-12-07
Payer: MEDICARE

## 2018-12-07 ENCOUNTER — HOSPITAL ENCOUNTER (INPATIENT)
Facility: HOSPITAL | Age: 73
LOS: 7 days | Discharge: HOME | DRG: 252 | End: 2018-12-14
Attending: HOSPITALIST | Admitting: SURGERY
Payer: MEDICARE

## 2018-12-07 ENCOUNTER — TELEPHONE (OUTPATIENT)
Dept: SURGERY | Facility: HOSPITAL | Age: 73
End: 2018-12-07

## 2018-12-07 ENCOUNTER — APPOINTMENT (INPATIENT)
Dept: RADIOLOGY | Facility: HOSPITAL | Age: 73
DRG: 252 | End: 2018-12-07
Payer: MEDICARE

## 2018-12-07 VITALS
TEMPERATURE: 97.7 F | HEIGHT: 60 IN | RESPIRATION RATE: 18 BRPM | OXYGEN SATURATION: 96 % | BODY MASS INDEX: 36.05 KG/M2 | HEART RATE: 50 BPM | WEIGHT: 183.6 LBS | SYSTOLIC BLOOD PRESSURE: 133 MMHG | DIASTOLIC BLOOD PRESSURE: 64 MMHG

## 2018-12-07 DIAGNOSIS — I73.9 PVD (PERIPHERAL VASCULAR DISEASE) (HCC): ICD-10-CM

## 2018-12-07 DIAGNOSIS — E78.49 OTHER HYPERLIPIDEMIA: ICD-10-CM

## 2018-12-07 DIAGNOSIS — I48.92 ATRIAL FLUTTER, UNSPECIFIED TYPE (HCC): ICD-10-CM

## 2018-12-07 DIAGNOSIS — J96.11 CHRONIC HYPOXEMIC RESPIRATORY FAILURE (HCC): ICD-10-CM

## 2018-12-07 DIAGNOSIS — N18.30 STAGE 3 CHRONIC KIDNEY DISEASE (HCC): Chronic | ICD-10-CM

## 2018-12-07 DIAGNOSIS — F02.81 DEMENTIA ASSOCIATED WITH OTHER UNDERLYING DISEASE WITH BEHAVIORAL DISTURBANCE (HCC): ICD-10-CM

## 2018-12-07 DIAGNOSIS — J44.9 CHRONIC OBSTRUCTIVE PULMONARY DISEASE, UNSPECIFIED COPD TYPE (HCC): Chronic | ICD-10-CM

## 2018-12-07 DIAGNOSIS — I50.32 CHRONIC DIASTOLIC CONGESTIVE HEART FAILURE (HCC): ICD-10-CM

## 2018-12-07 DIAGNOSIS — I70.211 INTERMITTENT CLAUDICATION OF RIGHT LOWER EXTREMITY DUE TO ATHEROSCLEROSIS (HCC): ICD-10-CM

## 2018-12-07 DIAGNOSIS — I48.91 ATRIAL FIBRILLATION (HCC): ICD-10-CM

## 2018-12-07 DIAGNOSIS — I10 ESSENTIAL HYPERTENSION: Primary | Chronic | ICD-10-CM

## 2018-12-07 DIAGNOSIS — R60.0 BILATERAL LEG EDEMA: ICD-10-CM

## 2018-12-07 DIAGNOSIS — G40.909 SEIZURE DISORDER (HCC): ICD-10-CM

## 2018-12-07 DIAGNOSIS — I69.398 MOOD DISORDER AS LATE EFFECT OF CEREBROVASCULAR ACCIDENT (CVA): ICD-10-CM

## 2018-12-07 DIAGNOSIS — J96.21 ACUTE ON CHRONIC RESPIRATORY FAILURE WITH HYPOXIA (HCC): ICD-10-CM

## 2018-12-07 DIAGNOSIS — I50.33 ACUTE ON CHRONIC DIASTOLIC CONGESTIVE HEART FAILURE (HCC): ICD-10-CM

## 2018-12-07 DIAGNOSIS — F06.30 MOOD DISORDER AS LATE EFFECT OF CEREBROVASCULAR ACCIDENT (CVA): ICD-10-CM

## 2018-12-07 DIAGNOSIS — I48.0 PAROXYSMAL ATRIAL FIBRILLATION (HCC): ICD-10-CM

## 2018-12-07 DIAGNOSIS — I48.20 CHRONIC ATRIAL FIBRILLATION (HCC): ICD-10-CM

## 2018-12-07 DIAGNOSIS — N39.0 URINARY TRACT INFECTION WITHOUT HEMATURIA, SITE UNSPECIFIED: ICD-10-CM

## 2018-12-07 LAB
ABO GROUP BLD: NORMAL
ANION GAP SERPL CALCULATED.3IONS-SCNC: 9 MMOL/L (ref 4–13)
BLD GP AB SCN SERPL QL: NEGATIVE
BUN SERPL-MCNC: 32 MG/DL (ref 5–25)
CALCIUM SERPL-MCNC: 8.2 MG/DL (ref 8.3–10.1)
CHLORIDE SERPL-SCNC: 112 MMOL/L (ref 100–108)
CO2 SERPL-SCNC: 24 MMOL/L (ref 21–32)
CREAT SERPL-MCNC: 1.1 MG/DL (ref 0.6–1.3)
ERYTHROCYTE [DISTWIDTH] IN BLOOD BY AUTOMATED COUNT: 12.9 % (ref 11.6–15.1)
GFR SERPL CREATININE-BSD FRML MDRD: 50 ML/MIN/1.73SQ M
GLUCOSE SERPL-MCNC: 166 MG/DL (ref 65–140)
HCT VFR BLD AUTO: 34 % (ref 34.8–46.1)
HGB BLD-MCNC: 11 G/DL (ref 11.5–15.4)
INR PPP: 1.17 (ref 0.86–1.17)
KCT BLD-ACNC: 230 SEC (ref 89–137)
KCT BLD-ACNC: 236 SEC (ref 89–137)
KCT BLD-ACNC: 254 SEC (ref 89–137)
MCH RBC QN AUTO: 33.3 PG (ref 26.8–34.3)
MCHC RBC AUTO-ENTMCNC: 32.4 G/DL (ref 31.4–37.4)
MCV RBC AUTO: 103 FL (ref 82–98)
PLATELET # BLD AUTO: 137 THOUSANDS/UL (ref 149–390)
PMV BLD AUTO: 12 FL (ref 8.9–12.7)
POTASSIUM SERPL-SCNC: 3.7 MMOL/L (ref 3.5–5.3)
PROTHROMBIN TIME: 14.8 SECONDS (ref 11.8–14.2)
RBC # BLD AUTO: 3.3 MILLION/UL (ref 3.81–5.12)
RH BLD: POSITIVE
SODIUM SERPL-SCNC: 145 MMOL/L (ref 136–145)
SPECIMEN EXPIRATION DATE: NORMAL
SPECIMEN SOURCE: ABNORMAL
WBC # BLD AUTO: 12.96 THOUSAND/UL (ref 4.31–10.16)

## 2018-12-07 PROCEDURE — C1769 GUIDE WIRE: HCPCS

## 2018-12-07 PROCEDURE — 047H3DZ DILATION OF RIGHT EXTERNAL ILIAC ARTERY WITH INTRALUMINAL DEVICE, PERCUTANEOUS APPROACH: ICD-10-PCS | Performed by: SURGERY

## 2018-12-07 PROCEDURE — 047C3ZZ DILATION OF RIGHT COMMON ILIAC ARTERY, PERCUTANEOUS APPROACH: ICD-10-PCS | Performed by: SURGERY

## 2018-12-07 PROCEDURE — C1874 STENT, COATED/COV W/DEL SYS: HCPCS | Performed by: SURGERY

## 2018-12-07 PROCEDURE — C1894 INTRO/SHEATH, NON-LASER: HCPCS | Performed by: INTERNAL MEDICINE

## 2018-12-07 PROCEDURE — 047D3DZ DILATION OF LEFT COMMON ILIAC ARTERY WITH INTRALUMINAL DEVICE, PERCUTANEOUS APPROACH: ICD-10-PCS | Performed by: SURGERY

## 2018-12-07 PROCEDURE — C1876 STENT, NON-COA/NON-COV W/DEL: HCPCS

## 2018-12-07 PROCEDURE — C1894 INTRO/SHEATH, NON-LASER: HCPCS

## 2018-12-07 PROCEDURE — C1725 CATH, TRANSLUMIN NON-LASER: HCPCS

## 2018-12-07 PROCEDURE — C1894 INTRO/SHEATH, NON-LASER: HCPCS | Performed by: SURGERY

## 2018-12-07 PROCEDURE — 86900 BLOOD TYPING SEROLOGIC ABO: CPT | Performed by: SURGERY

## 2018-12-07 PROCEDURE — C1876 STENT, NON-COA/NON-COV W/DEL: HCPCS | Performed by: SURGERY

## 2018-12-07 PROCEDURE — C1769 GUIDE WIRE: HCPCS | Performed by: SURGERY

## 2018-12-07 PROCEDURE — C1769 GUIDE WIRE: HCPCS | Performed by: INTERNAL MEDICINE

## 2018-12-07 PROCEDURE — 37221 HB ILIAC REVASC W/STENT: CPT

## 2018-12-07 PROCEDURE — 86850 RBC ANTIBODY SCREEN: CPT | Performed by: SURGERY

## 2018-12-07 PROCEDURE — C1760 CLOSURE DEV, VASC: HCPCS | Performed by: SURGERY

## 2018-12-07 PROCEDURE — 99222 1ST HOSP IP/OBS MODERATE 55: CPT | Performed by: HOSPITALIST

## 2018-12-07 PROCEDURE — 75625 CONTRAST EXAM ABDOMINL AORTA: CPT

## 2018-12-07 PROCEDURE — 37224 HB FEM/POPL REVAS W/TLA: CPT

## 2018-12-07 PROCEDURE — 86901 BLOOD TYPING SEROLOGIC RH(D): CPT | Performed by: SURGERY

## 2018-12-07 PROCEDURE — 047D3ZZ DILATION OF LEFT COMMON ILIAC ARTERY, PERCUTANEOUS APPROACH: ICD-10-PCS | Performed by: SURGERY

## 2018-12-07 PROCEDURE — 99152 MOD SED SAME PHYS/QHP 5/>YRS: CPT

## 2018-12-07 PROCEDURE — 85610 PROTHROMBIN TIME: CPT | Performed by: INTERNAL MEDICINE

## 2018-12-07 PROCEDURE — C1725 CATH, TRANSLUMIN NON-LASER: HCPCS | Performed by: INTERNAL MEDICINE

## 2018-12-07 PROCEDURE — C1874 STENT, COATED/COV W/DEL SYS: HCPCS

## 2018-12-07 PROCEDURE — 85027 COMPLETE CBC AUTOMATED: CPT | Performed by: SURGERY

## 2018-12-07 PROCEDURE — 37221 PR REVASCULARIZE ILIAC ARTERY,ANGIOPLASTY/STENT, INITIAL VESSEL: CPT | Performed by: SURGERY

## 2018-12-07 PROCEDURE — 85347 COAGULATION TIME ACTIVATED: CPT

## 2018-12-07 PROCEDURE — C1887 CATHETER, GUIDING: HCPCS

## 2018-12-07 PROCEDURE — C1887 CATHETER, GUIDING: HCPCS | Performed by: SURGERY

## 2018-12-07 PROCEDURE — 047C3DZ DILATION OF RIGHT COMMON ILIAC ARTERY WITH INTRALUMINAL DEVICE, PERCUTANEOUS APPROACH: ICD-10-PCS | Performed by: SURGERY

## 2018-12-07 PROCEDURE — C1757 CATH, THROMBECTOMY/EMBOLECT: HCPCS | Performed by: SURGERY

## 2018-12-07 PROCEDURE — 80048 BASIC METABOLIC PNL TOTAL CA: CPT | Performed by: SURGERY

## 2018-12-07 PROCEDURE — C1887 CATHETER, GUIDING: HCPCS | Performed by: INTERNAL MEDICINE

## 2018-12-07 PROCEDURE — B410YZZ FLUOROSCOPY OF ABDOMINAL AORTA USING OTHER CONTRAST: ICD-10-PCS | Performed by: SURGERY

## 2018-12-07 PROCEDURE — 37221 PR REVASCULARIZE ILIAC ARTERY,ANGIOPLASTY/STENT, INITIAL VESSEL: CPT | Performed by: RADIOLOGY

## 2018-12-07 PROCEDURE — 99153 MOD SED SAME PHYS/QHP EA: CPT

## 2018-12-07 PROCEDURE — C1725 CATH, TRANSLUMIN NON-LASER: HCPCS | Performed by: SURGERY

## 2018-12-07 DEVICE — IMPLANTABLE DEVICE: Type: IMPLANTABLE DEVICE | Site: ARTERIAL | Status: FUNCTIONAL

## 2018-12-07 DEVICE — CLOSURE DEVICE MYNX ACE 6F/7FR: Type: IMPLANTABLE DEVICE | Site: ARTERIAL | Status: FUNCTIONAL

## 2018-12-07 RX ORDER — FENTANYL CITRATE/PF 50 MCG/ML
25 SYRINGE (ML) INJECTION
Status: DISCONTINUED | OUTPATIENT
Start: 2018-12-07 | End: 2018-12-07 | Stop reason: HOSPADM

## 2018-12-07 RX ORDER — SODIUM CHLORIDE 9 MG/ML
50 INJECTION, SOLUTION INTRAVENOUS CONTINUOUS
Status: DISCONTINUED | OUTPATIENT
Start: 2018-12-07 | End: 2018-12-09

## 2018-12-07 RX ORDER — HYDROMORPHONE HCL/PF 1 MG/ML
0.2 SYRINGE (ML) INJECTION
Status: DISCONTINUED | OUTPATIENT
Start: 2018-12-07 | End: 2018-12-07 | Stop reason: HOSPADM

## 2018-12-07 RX ORDER — ONDANSETRON 2 MG/ML
INJECTION INTRAMUSCULAR; INTRAVENOUS AS NEEDED
Status: DISCONTINUED | OUTPATIENT
Start: 2018-12-07 | End: 2018-12-07 | Stop reason: SURG

## 2018-12-07 RX ORDER — GLYCOPYRROLATE 0.2 MG/ML
INJECTION INTRAMUSCULAR; INTRAVENOUS AS NEEDED
Status: DISCONTINUED | OUTPATIENT
Start: 2018-12-07 | End: 2018-12-07 | Stop reason: SURG

## 2018-12-07 RX ORDER — CARBAMAZEPINE 100 MG/1
100 TABLET, CHEWABLE ORAL 3 TIMES DAILY
Status: DISCONTINUED | OUTPATIENT
Start: 2018-12-07 | End: 2018-12-14 | Stop reason: HOSPADM

## 2018-12-07 RX ORDER — DILTIAZEM HYDROCHLORIDE 90 MG/1
180 CAPSULE, EXTENDED RELEASE ORAL EVERY 12 HOURS SCHEDULED
Status: DISCONTINUED | OUTPATIENT
Start: 2018-12-07 | End: 2018-12-14 | Stop reason: HOSPADM

## 2018-12-07 RX ORDER — AMOXICILLIN 250 MG
1 CAPSULE ORAL DAILY PRN
Status: DISCONTINUED | OUTPATIENT
Start: 2018-12-07 | End: 2018-12-14 | Stop reason: HOSPADM

## 2018-12-07 RX ORDER — CLINDAMYCIN PHOSPHATE 150 MG/ML
INJECTION, SOLUTION INTRAVENOUS AS NEEDED
Status: DISCONTINUED | OUTPATIENT
Start: 2018-12-07 | End: 2018-12-07 | Stop reason: SURG

## 2018-12-07 RX ORDER — CLOPIDOGREL BISULFATE 75 MG/1
75 TABLET ORAL DAILY
Status: DISCONTINUED | OUTPATIENT
Start: 2018-12-08 | End: 2018-12-14 | Stop reason: HOSPADM

## 2018-12-07 RX ORDER — ONDANSETRON 2 MG/ML
4 INJECTION INTRAMUSCULAR; INTRAVENOUS ONCE AS NEEDED
Status: DISCONTINUED | OUTPATIENT
Start: 2018-12-07 | End: 2018-12-07 | Stop reason: HOSPADM

## 2018-12-07 RX ORDER — MIDAZOLAM HYDROCHLORIDE 1 MG/ML
INJECTION INTRAMUSCULAR; INTRAVENOUS CODE/TRAUMA/SEDATION MEDICATION
Status: COMPLETED | OUTPATIENT
Start: 2018-12-07 | End: 2018-12-07

## 2018-12-07 RX ORDER — LIDOCAINE HYDROCHLORIDE 10 MG/ML
INJECTION, SOLUTION INFILTRATION; PERINEURAL CODE/TRAUMA/SEDATION MEDICATION
Status: DISCONTINUED | OUTPATIENT
Start: 2018-12-07 | End: 2018-12-11 | Stop reason: HOSPADM

## 2018-12-07 RX ORDER — LEVOTHYROXINE SODIUM 88 UG/1
88 TABLET ORAL
Status: DISCONTINUED | OUTPATIENT
Start: 2018-12-08 | End: 2018-12-14 | Stop reason: HOSPADM

## 2018-12-07 RX ORDER — HEPARIN SODIUM 10000 [USP'U]/100ML
INJECTION, SOLUTION INTRAVENOUS
Status: DISCONTINUED | OUTPATIENT
Start: 2018-12-07 | End: 2018-12-11 | Stop reason: HOSPADM

## 2018-12-07 RX ORDER — HYDRALAZINE HYDROCHLORIDE 20 MG/ML
INJECTION INTRAMUSCULAR; INTRAVENOUS CODE/TRAUMA/SEDATION MEDICATION
Status: DISCONTINUED | OUTPATIENT
Start: 2018-12-07 | End: 2018-12-11 | Stop reason: HOSPADM

## 2018-12-07 RX ORDER — ONDANSETRON 2 MG/ML
4 INJECTION INTRAMUSCULAR; INTRAVENOUS EVERY 6 HOURS PRN
Status: DISCONTINUED | OUTPATIENT
Start: 2018-12-07 | End: 2018-12-14 | Stop reason: HOSPADM

## 2018-12-07 RX ORDER — FENTANYL CITRATE 50 UG/ML
INJECTION, SOLUTION INTRAMUSCULAR; INTRAVENOUS AS NEEDED
Status: DISCONTINUED | OUTPATIENT
Start: 2018-12-07 | End: 2018-12-07 | Stop reason: SURG

## 2018-12-07 RX ORDER — NEOSTIGMINE METHYLSULFATE 1 MG/ML
INJECTION INTRAVENOUS AS NEEDED
Status: DISCONTINUED | OUTPATIENT
Start: 2018-12-07 | End: 2018-12-07 | Stop reason: SURG

## 2018-12-07 RX ORDER — SODIUM CHLORIDE 9 MG/ML
125 INJECTION, SOLUTION INTRAVENOUS CONTINUOUS
Status: DISCONTINUED | OUTPATIENT
Start: 2018-12-07 | End: 2018-12-07

## 2018-12-07 RX ORDER — ALBUMIN, HUMAN INJ 5% 5 %
SOLUTION INTRAVENOUS CONTINUOUS PRN
Status: DISCONTINUED | OUTPATIENT
Start: 2018-12-07 | End: 2018-12-07 | Stop reason: SURG

## 2018-12-07 RX ORDER — ALLOPURINOL 300 MG/1
300 TABLET ORAL EVERY MORNING
Status: DISCONTINUED | OUTPATIENT
Start: 2018-12-08 | End: 2018-12-14 | Stop reason: HOSPADM

## 2018-12-07 RX ORDER — EPHEDRINE SULFATE 50 MG/ML
INJECTION, SOLUTION INTRAVENOUS AS NEEDED
Status: DISCONTINUED | OUTPATIENT
Start: 2018-12-07 | End: 2018-12-07 | Stop reason: SURG

## 2018-12-07 RX ORDER — SODIUM CHLORIDE, SODIUM LACTATE, POTASSIUM CHLORIDE, CALCIUM CHLORIDE 600; 310; 30; 20 MG/100ML; MG/100ML; MG/100ML; MG/100ML
INJECTION, SOLUTION INTRAVENOUS CONTINUOUS PRN
Status: DISCONTINUED | OUTPATIENT
Start: 2018-12-07 | End: 2018-12-07 | Stop reason: SURG

## 2018-12-07 RX ORDER — PROPOFOL 10 MG/ML
INJECTION, EMULSION INTRAVENOUS AS NEEDED
Status: DISCONTINUED | OUTPATIENT
Start: 2018-12-07 | End: 2018-12-07 | Stop reason: SURG

## 2018-12-07 RX ORDER — DIPHENHYDRAMINE HYDROCHLORIDE 50 MG/ML
12.5 INJECTION INTRAMUSCULAR; INTRAVENOUS ONCE AS NEEDED
Status: DISCONTINUED | OUTPATIENT
Start: 2018-12-07 | End: 2018-12-07 | Stop reason: HOSPADM

## 2018-12-07 RX ORDER — MIDAZOLAM HYDROCHLORIDE 1 MG/ML
INJECTION INTRAMUSCULAR; INTRAVENOUS CODE/TRAUMA/SEDATION MEDICATION
Status: DISCONTINUED | OUTPATIENT
Start: 2018-12-07 | End: 2018-12-11 | Stop reason: HOSPADM

## 2018-12-07 RX ORDER — CLOPIDOGREL BISULFATE 75 MG/1
TABLET ORAL CODE/TRAUMA/SEDATION MEDICATION
Status: DISCONTINUED | OUTPATIENT
Start: 2018-12-07 | End: 2018-12-11 | Stop reason: HOSPADM

## 2018-12-07 RX ORDER — METOPROLOL TARTRATE 50 MG/1
50 TABLET, FILM COATED ORAL EVERY 12 HOURS SCHEDULED
Status: DISCONTINUED | OUTPATIENT
Start: 2018-12-07 | End: 2018-12-14 | Stop reason: HOSPADM

## 2018-12-07 RX ORDER — FLUTICASONE FUROATE AND VILANTEROL 100; 25 UG/1; UG/1
1 POWDER RESPIRATORY (INHALATION) DAILY
Status: DISCONTINUED | OUTPATIENT
Start: 2018-12-08 | End: 2018-12-14 | Stop reason: HOSPADM

## 2018-12-07 RX ORDER — DIVALPROEX SODIUM 250 MG/1
250 TABLET, EXTENDED RELEASE ORAL DAILY
Status: DISCONTINUED | OUTPATIENT
Start: 2018-12-08 | End: 2018-12-14 | Stop reason: HOSPADM

## 2018-12-07 RX ORDER — FENOFIBRATE 48 MG/1
48 TABLET, COATED ORAL DAILY
Status: DISCONTINUED | OUTPATIENT
Start: 2018-12-08 | End: 2018-12-14 | Stop reason: HOSPADM

## 2018-12-07 RX ORDER — DEXAMETHASONE SODIUM PHOSPHATE 4 MG/ML
INJECTION, SOLUTION INTRA-ARTICULAR; INTRALESIONAL; INTRAMUSCULAR; INTRAVENOUS; SOFT TISSUE AS NEEDED
Status: DISCONTINUED | OUTPATIENT
Start: 2018-12-07 | End: 2018-12-07 | Stop reason: SURG

## 2018-12-07 RX ORDER — FENTANYL CITRATE 50 UG/ML
INJECTION, SOLUTION INTRAMUSCULAR; INTRAVENOUS CODE/TRAUMA/SEDATION MEDICATION
Status: COMPLETED | OUTPATIENT
Start: 2018-12-07 | End: 2018-12-07

## 2018-12-07 RX ORDER — ROCURONIUM BROMIDE 10 MG/ML
INJECTION, SOLUTION INTRAVENOUS AS NEEDED
Status: DISCONTINUED | OUTPATIENT
Start: 2018-12-07 | End: 2018-12-07 | Stop reason: SURG

## 2018-12-07 RX ORDER — DIPHENHYDRAMINE HYDROCHLORIDE 50 MG/ML
INJECTION INTRAMUSCULAR; INTRAVENOUS CODE/TRAUMA/SEDATION MEDICATION
Status: DISCONTINUED | OUTPATIENT
Start: 2018-12-07 | End: 2018-12-11 | Stop reason: HOSPADM

## 2018-12-07 RX ORDER — ALBUTEROL SULFATE 2.5 MG/3ML
2.5 SOLUTION RESPIRATORY (INHALATION) EVERY 6 HOURS PRN
Status: DISCONTINUED | OUTPATIENT
Start: 2018-12-07 | End: 2018-12-14 | Stop reason: HOSPADM

## 2018-12-07 RX ORDER — BUPROPION HYDROCHLORIDE 150 MG/1
150 TABLET, EXTENDED RELEASE ORAL DAILY
Status: DISCONTINUED | OUTPATIENT
Start: 2018-12-08 | End: 2018-12-14 | Stop reason: HOSPADM

## 2018-12-07 RX ORDER — ATORVASTATIN CALCIUM 10 MG/1
10 TABLET, FILM COATED ORAL DAILY
Status: DISCONTINUED | OUTPATIENT
Start: 2018-12-08 | End: 2018-12-14 | Stop reason: HOSPADM

## 2018-12-07 RX ORDER — HEPARIN SODIUM 1000 [USP'U]/ML
INJECTION, SOLUTION INTRAVENOUS; SUBCUTANEOUS CODE/TRAUMA/SEDATION MEDICATION
Status: DISCONTINUED | OUTPATIENT
Start: 2018-12-07 | End: 2018-12-11 | Stop reason: HOSPADM

## 2018-12-07 RX ORDER — FENTANYL CITRATE 50 UG/ML
INJECTION, SOLUTION INTRAMUSCULAR; INTRAVENOUS CODE/TRAUMA/SEDATION MEDICATION
Status: DISCONTINUED | OUTPATIENT
Start: 2018-12-07 | End: 2018-12-11 | Stop reason: HOSPADM

## 2018-12-07 RX ORDER — LIDOCAINE HYDROCHLORIDE 10 MG/ML
INJECTION, SOLUTION INFILTRATION; PERINEURAL CODE/TRAUMA/SEDATION MEDICATION
Status: COMPLETED | OUTPATIENT
Start: 2018-12-07 | End: 2018-12-07

## 2018-12-07 RX ORDER — ALBUTEROL SULFATE 90 UG/1
2 AEROSOL, METERED RESPIRATORY (INHALATION) EVERY 6 HOURS PRN
Status: DISCONTINUED | OUTPATIENT
Start: 2018-12-07 | End: 2018-12-14 | Stop reason: HOSPADM

## 2018-12-07 RX ORDER — SODIUM CHLORIDE, SODIUM LACTATE, POTASSIUM CHLORIDE, CALCIUM CHLORIDE 600; 310; 30; 20 MG/100ML; MG/100ML; MG/100ML; MG/100ML
50 INJECTION, SOLUTION INTRAVENOUS CONTINUOUS
Status: DISCONTINUED | OUTPATIENT
Start: 2018-12-07 | End: 2018-12-09

## 2018-12-07 RX ORDER — HEPARIN SODIUM 1000 [USP'U]/ML
INJECTION, SOLUTION INTRAVENOUS; SUBCUTANEOUS CODE/TRAUMA/SEDATION MEDICATION
Status: COMPLETED | OUTPATIENT
Start: 2018-12-07 | End: 2018-12-07

## 2018-12-07 RX ORDER — WARFARIN SODIUM 1 MG/1
3 TABLET ORAL
Status: DISCONTINUED | OUTPATIENT
Start: 2018-12-08 | End: 2018-12-09

## 2018-12-07 RX ADMIN — EPHEDRINE SULFATE 10 MG: 50 INJECTION, SOLUTION INTRAMUSCULAR; INTRAVENOUS; SUBCUTANEOUS at 16:34

## 2018-12-07 RX ADMIN — NEOSTIGMINE METHYLSULFATE 2 MG: 1 INJECTION INTRAVENOUS at 18:01

## 2018-12-07 RX ADMIN — HEPARIN SODIUM 2000 UNITS: 1000 INJECTION INTRAVENOUS; SUBCUTANEOUS at 10:57

## 2018-12-07 RX ADMIN — ONDANSETRON 4 MG: 2 INJECTION INTRAMUSCULAR; INTRAVENOUS at 17:55

## 2018-12-07 RX ADMIN — FENTANYL CITRATE 50 MCG: 50 INJECTION, SOLUTION INTRAMUSCULAR; INTRAVENOUS at 12:41

## 2018-12-07 RX ADMIN — DIPHENHYDRAMINE HYDROCHLORIDE 25 MG: 50 INJECTION, SOLUTION INTRAMUSCULAR; INTRAVENOUS at 12:52

## 2018-12-07 RX ADMIN — FENTANYL CITRATE 100 MCG: 50 INJECTION, SOLUTION INTRAMUSCULAR; INTRAVENOUS at 16:15

## 2018-12-07 RX ADMIN — MIDAZOLAM HYDROCHLORIDE 1 MG: 1 INJECTION, SOLUTION INTRAMUSCULAR; INTRAVENOUS at 12:40

## 2018-12-07 RX ADMIN — FENTANYL CITRATE 50 MCG: 50 INJECTION, SOLUTION INTRAMUSCULAR; INTRAVENOUS at 13:18

## 2018-12-07 RX ADMIN — HEPARIN SODIUM 2000 UNITS: 1000 INJECTION, SOLUTION INTRAVENOUS; SUBCUTANEOUS at 12:54

## 2018-12-07 RX ADMIN — EPHEDRINE SULFATE 10 MG: 50 INJECTION, SOLUTION INTRAMUSCULAR; INTRAVENOUS; SUBCUTANEOUS at 16:59

## 2018-12-07 RX ADMIN — HEPARIN SODIUM 2000 UNITS: 1000 INJECTION INTRAVENOUS; SUBCUTANEOUS at 11:47

## 2018-12-07 RX ADMIN — CLOPIDOGREL 600 MG: 75 TABLET, FILM COATED ORAL at 12:15

## 2018-12-07 RX ADMIN — FENTANYL CITRATE 50 MCG: 50 INJECTION, SOLUTION INTRAMUSCULAR; INTRAVENOUS at 10:01

## 2018-12-07 RX ADMIN — DILTIAZEM HYDROCHLORIDE 180 MG: 90 CAPSULE, EXTENDED RELEASE ORAL at 22:17

## 2018-12-07 RX ADMIN — HEPARIN SODIUM AND DEXTROSE 12 UNITS/KG/HR: 10000; 5 INJECTION INTRAVENOUS at 13:32

## 2018-12-07 RX ADMIN — GLYCOPYRROLATE 0.2 MG: 0.2 INJECTION, SOLUTION INTRAMUSCULAR; INTRAVENOUS at 18:01

## 2018-12-07 RX ADMIN — ALBUMIN (HUMAN): 12.5 SOLUTION INTRAVENOUS at 16:40

## 2018-12-07 RX ADMIN — MIDAZOLAM HYDROCHLORIDE 1 MG: 1 INJECTION, SOLUTION INTRAMUSCULAR; INTRAVENOUS at 13:17

## 2018-12-07 RX ADMIN — SODIUM CHLORIDE 125 ML/HR: 0.9 INJECTION, SOLUTION INTRAVENOUS at 06:55

## 2018-12-07 RX ADMIN — LIDOCAINE HYDROCHLORIDE 10 ML: 10 INJECTION, SOLUTION INFILTRATION; PERINEURAL at 12:41

## 2018-12-07 RX ADMIN — CARBAMAZEPINE 100 MG: 100 TABLET, CHEWABLE ORAL at 22:17

## 2018-12-07 RX ADMIN — EPHEDRINE SULFATE 10 MG: 50 INJECTION, SOLUTION INTRAMUSCULAR; INTRAVENOUS; SUBCUTANEOUS at 16:37

## 2018-12-07 RX ADMIN — SODIUM CHLORIDE 50 ML/HR: 0.9 INJECTION, SOLUTION INTRAVENOUS at 20:06

## 2018-12-07 RX ADMIN — SODIUM CHLORIDE, SODIUM LACTATE, POTASSIUM CHLORIDE, AND CALCIUM CHLORIDE: .6; .31; .03; .02 INJECTION, SOLUTION INTRAVENOUS at 16:04

## 2018-12-07 RX ADMIN — PROPOFOL 200 MG: 10 INJECTION, EMULSION INTRAVENOUS at 16:15

## 2018-12-07 RX ADMIN — CLINDAMYCIN PHOSPHATE 900 MG: 150 INJECTION, SOLUTION INTRAMUSCULAR; INTRAVENOUS at 16:25

## 2018-12-07 RX ADMIN — EPHEDRINE SULFATE 10 MG: 50 INJECTION, SOLUTION INTRAMUSCULAR; INTRAVENOUS; SUBCUTANEOUS at 16:40

## 2018-12-07 RX ADMIN — METOPROLOL TARTRATE 50 MG: 50 TABLET, FILM COATED ORAL at 21:58

## 2018-12-07 RX ADMIN — LIDOCAINE HYDROCHLORIDE 9 ML: 10 INJECTION, SOLUTION INFILTRATION; PERINEURAL at 10:02

## 2018-12-07 RX ADMIN — ROCURONIUM BROMIDE 50 MG: 10 INJECTION INTRAVENOUS at 16:15

## 2018-12-07 RX ADMIN — HEPARIN SODIUM 5000 UNITS: 1000 INJECTION INTRAVENOUS; SUBCUTANEOUS at 10:40

## 2018-12-07 RX ADMIN — FENTANYL CITRATE 50 MCG: 50 INJECTION, SOLUTION INTRAMUSCULAR; INTRAVENOUS at 12:14

## 2018-12-07 RX ADMIN — HYDRALAZINE HYDROCHLORIDE 10 MG: 20 INJECTION INTRAMUSCULAR; INTRAVENOUS at 13:06

## 2018-12-07 RX ADMIN — DEXAMETHASONE SODIUM PHOSPHATE 4 MG: 4 INJECTION, SOLUTION INTRAMUSCULAR; INTRAVENOUS at 16:44

## 2018-12-07 RX ADMIN — FENTANYL CITRATE 50 MCG: 50 INJECTION, SOLUTION INTRAMUSCULAR; INTRAVENOUS at 11:32

## 2018-12-07 RX ADMIN — MIDAZOLAM HYDROCHLORIDE 1 MG: 1 INJECTION, SOLUTION INTRAMUSCULAR; INTRAVENOUS at 10:01

## 2018-12-07 NOTE — BRIEF OP NOTE (RAD/CATH)
CARDIAC LOWER EXTREMITY ANGIOGRAPHY    Right SFA angioplasty with Drug coated balloon  Right external iliac angioplasty and stent placement  Procedure Note    PATIENT NAME: Taylor Cruz  : 1945  MRN: [id number]     Pre-op Diagnosis:   1  Intermittent claudication of right lower extremity due to atherosclerosis (HCC)      Post-op Diagnosis:   1  Intermittent claudication of right lower extremity due to atherosclerosis [facility])        Surgeon:   Emily Mendoza MD    Estimated Blood Loss:  5 -10 cc    Procedure description:  Patient was brought to cath lab of risks and benefits explained informed consent was obtained  Patient was prepped and draped in sterile fashion  2% lidocaine was administered in the left groin and access was obtained using 5 Western Lizbeth sheath  Omniflush catheter was used to cross over to the contralateral side  0 35 stiff Glidewire was used to advance 45 cm destination sheath to right CFA  Selective right SFA angiogram was performed  Patient had a lesion in distal SFA  The gradient across the lesion was measured using endhole catheter  The peak gradient was more than 30 mm hg(popliteal, 89/60 mm Hg and proximal /65 mm hg)  Heparin was used for anticoagulation and ACT was maintained more than 250  The lesion was crossed with V18 wire  The lesion was initially intervened with 5 x 80 mm  balloon followed by intervention with 5 x 80 mm Admiral drug-eluting balloon  There was a small non flow limiting dissection at the lesion which was initially left alone  The destination sheath was pulled back into the common iliac artery and selective angiogram of right external iliac artery was performed in multiple views  The pressure gradient across the lesion was measured by using end hole catheter  The peak gradient was more than 20 mm hg (CFA, 131/65 mm Hg and TEMO 155/70 mm hg, Aorta 180/80 mmhg)  The the lesion was initially intervened with 6 x 40 mm  balloon   There was a small flow limited dissection and decision was made to cover the lesion with Innova self expanding stent measuring 7 x 80 mm   The destination sheath was pulled back to make room for the EIA stent and  Innova self expanding stent measuring 7 x 80 mm was deployed  The lesion length was 69 mm  Following external iliac intervention, right lower extremity angiogram was performed to evaluate distal the vasculature  The right SFA dissection appeared to have worsened but it was non flow limiting with no overlying thrombus  An attempt was made intervene SFA dissection with balloon to tamponade the dissection but was unsuccesful  At this point, it was found that the proximal edge of EIA stent had migrated to TEMO and aortic bifurcation  The cause of this stent migration is not clear but could be defective stent device which the proximal edge of stent failed to deploy appropriately  Urgent consultation was obtained from vascular surgery and interventional Radiology  Consultation was obtained with Dr Robledo from vascular surgery  He reviewed the angiogram and recommended aortic bifurcation stent  An access was obtained on the right CFA and an attempt was made to wire the right external and common iliac artery and crush the self expanding right iliac stent with new balloon expandable stent  I successfully wired across the stent with V18 wire but unable to cross the stent with balloon  The options for the balloons and stents were limited at the [facility] and there was no vascular surgery back up  After consultation with vascular surgery and interventional Radiology, the decision was made to transfer the patient to [facility] for higher level of care  Patient was given plavix 600mg and was started on heparin drip  Patient had good perfusion in both the lower extremities, there was a small non-flow limited dissection at right external at the distal edge of stent   Both the sheaths were secured in place before transfer  I discussed the patient with Dr Dickey [place] Dr Val Bobby  I spoke to patient and her daughter Curly Me and updated them about plan  I got verbal consent from the daughter for the transfer  Hemodynamics:  Distal Aortic pressure: 180/80 mmhg  Right CFA/proximal SFA: 155/69mmhg  Right Popliteal artery: 89/60 mmhg     Findings:  Right-sided:    -There is no significant stenosis in the right common iliac artery and internal iliac artery  -There is 70% lesion in the right external iliac artery, peak pressure gradient more than 20 mm hg  - There is no significant stenosis in the right common femoral artery and profunda femoral artery  - There is 70% lesion in the distal SFA, peak pressure gradient more than 30 mm hg  - The is no significant stenosis in the right popliteal artery   - No significant stenosis in tibioperoneal trunk, anterior tibial artery, posterior tibial artery and peroneal artery     Left side:  No significant stenosis in the left common iliac artery, internal iliac artery and external iliac artery  No significant stenosis in the left common femoral artery  Complications:  Right external iliac artery stent migration    Anesthesia: Conscious sedation and Local     Plan:  Patient was given Plavix and heparin drip was started  Patient is transferred to [facility] by air for higher level of care  Discussed the patient with vascular surgeon and interventional radiologist at [facility]      Negar Awad MD     Date: 12/7/2018  Time: 5:34 PM

## 2018-12-07 NOTE — ANESTHESIA POSTPROCEDURE EVALUATION
Post-Op Assessment Note      CV Status:  Stable    Mental Status:  Awake    Hydration Status:  Stable    PONV Controlled:  None    Airway Patency:  Patent    Post Op Vitals Reviewed: Yes          Staff: AnesthesiologistGRIFFIN           BP  136/68   Temp   98 4   Pulse 78   Resp   16   SpO2   99

## 2018-12-07 NOTE — OR NURSING
Pt arrived to holding area at 1503  Pt transfer from [facility]  Pt confused  Verified pt ID band

## 2018-12-07 NOTE — ANESTHESIA PREPROCEDURE EVALUATION
Review of Systems/Medical History          Cardiovascular  Hyperlipidemia, Hypertension , Past MI > 6 months, CAD , CAD status: 3VD, CHF ,    Pulmonary  COPD moderate- medication dependent , Shortness of breath, Sleep apnea ,        GI/Hepatic    GERD ,             Endo/Other  History of thyroid disease , hypothyroidism,      GYN       Hematology   Musculoskeletal       Neurology  Seizures ,  CVA , Headaches,    Psychology                Anesthesia Plan  ASA Score- 4 Emergent    Anesthesia Type- general with ASA Monitors  Additional Monitors:   Airway Plan: ETT  Plan Factors-    Induction- intravenous  Postoperative Plan-     Informed Consent- Anesthetic plan and risks discussed with patient  I personally reviewed this patient with the CRNA  Discussed and agreed on the Anesthesia Plan with the CRNA  Pilar Yang

## 2018-12-07 NOTE — OR NURSING
Pt's daughter Radha Hawkins called for update and status of her mother  Discussed with her case and updated on mothers status

## 2018-12-07 NOTE — EMTALA/ACUTE CARE TRANSFER
[facility] [address]  [phone] [id number]  Dept: [phone]      ACUTE CARE TRANSFER CONSENT    NAME [facility]                                                                       MRN [id number]    I have been informed of my rights regarding examination, treatment, and transfer   by Dr Vera Diego MD    Benefits: Specialized equipment and/or services available at the receiving facility (Include comment)________________________ (vascular and stents needed not availabel)    Risks: Potential for delay in receiving treatment, Potential deterioration of medical condition, Loss of IV, Increased discomfort during transfer, Possible worsening of condition or death during transfer      Consent for Transfer:  I acknowledge that my medical condition has been evaluated and explained to me by the treating physician or other qualified medical person and/or my attending physician, who has recommended that I be transferred to the service of  Accepting Physician: Dr Es Watson at [address] [facility] Name, [address] : [facility]  The above potential benefits of such transfer, the potential risks associated with such transfer, and the probable risks of not being transferred have been explained to me, and I fully understand them  The doctor has explained that, in my case, the benefits of transfer outweigh the risks  I agree to be transferred  I authorize the performance of emergency medical procedures and treatments upon me in both transit and upon arrival at the receiving facility  Additionally, I authorize the release of any and all medical records to the receiving facility and request they be transported with me, if possible  I understand that the safest mode of transportation during a medical emergency is an ambulance and that the Hospital advocates the use of this mode of transport   Risks of traveling to the receiving facility by car, including absence of medical control, life sustaining equipment, such as oxygen, and medical personnel has been explained to me and I fully understand them  (FRANCES CORRECT BOX BELOW)  [  ]  I consent to the stated transfer and to be transported by ambulance/helicopter  [  ]  I consent to the stated transfer, but refuse transportation by ambulance and accept full responsibility for my transportation by car  I understand the risks of non-ambulance transfers and I exonerate the Hospital and its staff from any deterioration in my condition that results from this refusal     X___________________________________________    DATE  18  TIME________  Signature of patient or legally responsible individual signing on patient behalf           RELATIONSHIP TO PATIENT_________________________          Provider Certification    NAME Yamila Ortega                                        [facility] 1945                              MRN [id number]    A medical screening exam was performed on the above named patient    Based on the examination:    Condition Necessitating Transfer higher level of care    Patient Condition: The patient has been stabilized such that within reasonable medical probability, no material deterioration of the patient condition or the condition of the unborn child(lili) is likely to result from the transfer    Reason for Transfer: Level of Care needed not available at this facility    [facility] Requirements:  [facility]   · Space available and qualified personnel available for treatment as acknowledged by Ludivina Iglesias   · Agreed to accept transfer and to provide appropriate medical treatment as acknowledged by          · Appropriate medical records of the examination and treatment of the patient are provided at the time of transfer   [address] _______  · Transfer will be performed by qualified personnel from    and appropriate transfer equipment as required, including the use of necessary and appropriate life support measures  Provider Certification: I have examined the patient and explained the following risks and benefits of being transferred/refusing transfer to the patient/family:  General risk, such as traffic hazards, adverse weather conditions, rough terrain or turbulence, possible failure of equipment (including vehicle or aircraft), or consequences of actions of persons outside the control of the transport personnel      Based on these reasonable risks and benefits to the patient and/or the unborn child(lili), and based upon the information available at the time of the patients examination, I certify that the medical benefits reasonably to be expected from the provision of appropriate medical treatments at another medical facility outweigh the increasing risks, if any, to the individuals medical condition, and in the case of labor to the unborn child, from effecting the transfer      X____________________________________________ DATE 12/07/18        TIME_______      ORIGINAL - SEND TO MEDICAL RECORDS   COPY - SEND WITH PATIENT DURING TRANSFER

## 2018-12-07 NOTE — EMTALA/ACUTE CARE TRANSFER
[facility] [address]  [phone]  Dept: [phone]      ACUTE CARE TRANSFER CONSENT    NAME Aracelis Flores                                                                       MRN [id number]    I have been informed of my rights regarding examination, treatment, and transfer   by Dr Juliane Trujillo MD    Benefits: Specialized equipment and/or services available at the receiving facility (Include comment)________________________ (vascular and stents needed not availabel)    Risks: Potential for delay in receiving treatment, Potential deterioration of medical condition, Loss of IV, Increased discomfort during transfer, Possible worsening of condition or death during transfer      Consent for Transfer:  I acknowledge that my medical condition has been evaluated and explained to me by the treating physician or other qualified medical person and/or my attending physician, who has recommended that I be transferred to the service of    at [address] [place] [address] Name, [address] : [facility]  The above potential benefits of such transfer, the potential risks associated with such transfer, and the probable risks of not being transferred have been explained to me, and I fully understand them  The doctor has explained that, in my case, the benefits of transfer outweigh the risks  I agree to be transferred  I authorize the performance of emergency medical procedures and treatments upon me in both transit and upon arrival at the receiving facility  Additionally, I authorize the release of any and all medical records to the receiving facility and request they be transported with me, if possible  I understand that the safest mode of transportation during a medical emergency is an ambulance and that the Hospital advocates the use of this mode of transport   Risks of traveling to the receiving facility by car, including absence of medical control, life sustaining equipment, such as oxygen, and medical personnel has been explained to me and I fully understand them  (FRANCES CORRECT BOX BELOW)  [  ]  I consent to the stated transfer and to be transported by ambulance/helicopter  [  ]  I consent to the stated transfer, but refuse transportation by ambulance and accept full responsibility for my transportation by car  I understand the risks of non-ambulance transfers and I exonerate the Hospital and its staff from any deterioration in my condition that results from this refusal     X___________________________________________    DATE  18  TIME________  Signature of patient or legally responsible individual signing on patient behalf           RELATIONSHIP TO PATIENT_________________________          Provider Certification    NAME Jean Paul Valerio                                        [facility] 1945                              MRN [id number]    A medical screening exam was performed on the above named patient    Based on the examination:    Condition Necessitating Transfer  Higher level of car    Patient Condition: The patient has been stabilized such that within reasonable medical probability, no material deterioration of the patient condition or the condition of the unborn child(lili) is likely to result from the transfer    Reason for Transfer: Level of Care needed not available at this facility    [facility] Requirements: [facility]   · Space available and qualified personnel available for treatment as acknowledged by Geni   · Agreed to accept transfer and to provide appropriate medical treatment as acknowledged by          · Appropriate medical records of the examination and treatment of the patient are provided at the time of transfer   [address] _______  · Transfer will be performed by qualified personnel from    and appropriate transfer equipment as required, including the use of necessary and appropriate life support measures  Provider Certification: I have examined the patient and explained the following risks and benefits of being transferred/refusing transfer to the patient/family:  General risk, such as traffic hazards, adverse weather conditions, rough terrain or turbulence, possible failure of equipment (including vehicle or aircraft), or consequences of actions of persons outside the control of the transport personnel      Based on these reasonable risks and benefits to the patient and/or the unborn child(lili), and based upon the information available at the time of the patients examination, I certify that the medical benefits reasonably to be expected from the provision of appropriate medical treatments at another medical facility outweigh the increasing risks, if any, to the individuals medical condition, and in the case of labor to the unborn child, from effecting the transfer      X____________________________________________ DATE 12/07/18        TIME_______      ORIGINAL - SEND TO MEDICAL RECORDS   COPY - SEND WITH PATIENT DURING TRANSFER

## 2018-12-07 NOTE — INTERVAL H&P NOTE
Update: (This section must be completed if the H&P was completed greater than 24 hrs to procedure or admission)    H&P reviewed  After examining the patient, I find no changed to the H&P since it had been written  Patient re-evaluated   Accept as history and physical     Ivonne Ambrocio MD/December 7, 2018/8:28 AM

## 2018-12-07 NOTE — H&P (VIEW-ONLY)
Cardiology Consultation     Manisha Cooley  [id number]  1945  [address] [place] [address]    C/c:FOLLOW-UP OF PERIPHERAL ARTERY DISEASE AND LEFT SFA ANGIOPLASTY      HPI: 69-year-old female patient with past medical history of coronary disease, peripheral artery disease status post left SFA angioplasty recently complicated by right groin hematoma, hypertension hyperlipidemia, CKD, AFib/flutter is here for follow-up    Patient continues to have significant right lower extremity claudication with minimal exertion which she describes as like tightness on exertion    Patient still has significant lesions in the right external iliac artery and right femoral artery which are not intervened   Right groin hematoma is resolved and hemoglobin has remained stable  She has mild lower extremity edema which is chronic  She denies having any chest pain, shortness of breath, orthopnea, paroxysmal nocturnal dyspnea, significant weight gain    Patient is also not able to wear CPAP at night as it is very uncomfortable        SERUM CREATININE HAS IMPROVED SIGNIFICANTLY TO 1 37    Patient Active Problem List   Diagnosis    HTN (hypertension)    [facility] disease) [facility]    Acute on chronic respiratory failure (HCC)    Atrial flutter (HCC)    Back pain    Dementia    Hyperlipidemia    UTI (urinary tract infection)    Seizure disorder ([facility] )    RSV infection    Ambulatory dysfunction    Hypernatremia    Stage [facility]    Bilateral leg edema    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    JESENIA (obstructive sleep apnea)    Intermittent claudication of both lower extremities due to atherosclerosis (HCC)    Atrial fibrillation (HCC)    SOB (shortness of breath)    Chronic indwelling Davis catheter    Bradycardia    Chronic hypoxemic respiratory failure ([place] [address] )    Essential hypertension    Peripheral artery disease (HCC)    Hematoma    Contrast dye induced nephropathy     Past Medical History:   Diagnosis Date    Atrial fibrillation ([place] [address] )     Brain aneurysm     CAD (coronary artery disease)     Cardiac disease     had stents 12 years ago in [facility]    CHF (congestive heart failure) ([place] [address] )     CKD (chronic kidney disease)     [facility]     Dementia     Dementia     Disease of thyroid gland     GERD (gastroesophageal reflux disease)     Hyperlipidemia     Hyperlipidemia     Hypertension     MI (myocardial infarction) ([place] [address] )     Migraine     Renal disorder     Seizures ([place] [address] )     Stroke ([place] [address] )      Social History     Social History    Marital status:      Spouse name: N/A    Number of children: 3    Years of education: N/A     Occupational History    retired      Social History Main Topics    Smoking status: Former Smoker     Quit date: 9/7/2007    Smokeless tobacco: Never Used    Alcohol use No    Drug use: No    Sexual activity: Not on file     Other Topics Concern    Not on file     Social History Narrative    No narrative on file      Family History   Problem Relation Age of Onset    Heart disease Father     Parkinsonism Father     Macular degeneration Mother     Glaucoma Mother     Diabetes Brother     Heart disease Brother      Past Surgical History:   Procedure Laterality Date    APPENDECTOMY      BLADDER TUMOR EXCISION      BRAIN SURGERY      1998  clip right frontal lobe    CARDIAC SURGERY      COLONOSCOPY      CORONARY STENT PLACEMENT      5 stents    EYE SURGERY      MANDIBLE FRACTURE SURGERY      TONSILLECTOMY      TUBAL LIGATION      UTERINE FIBROID SURGERY         Current Outpatient Prescriptions:     albuterol (2 5 mg/3 mL) 0 083 % nebulizer solution, Take 2 5 mg by nebulization every 6 (six) hours as needed for wheezing, Disp: , Rfl:     albuterol (PROVENTIL HFA,VENTOLIN HFA) 90 mcg/act inhaler, Inhale 2 puffs every 6 (six) hours as needed for wheezing, Disp: , Rfl:     allopurinol (ZYLOPRIM) 300 mg tablet, Take 300 mg by mouth every morning  , Disp: , Rfl:     atorvastatin (LIPITOR) 10 mg tablet, Take 10 mg by mouth daily, Disp: , Rfl:     buPROPion (WELLBUTRIN SR) 150 mg 12 hr tablet, Take 150 mg by mouth daily, Disp: , Rfl:     carBAMazepine (TEGretol) 100 mg chewable tablet, Chew 100 mg 3 (three) times a day, Disp: , Rfl:     Cholecalciferol (VITAMIN D) 2000 units CAPS, Take 1 capsule by mouth daily  , Disp: , Rfl:     Citric Pz-Lkydlrbbgoz-Cw Carb (RENACIDIN) SOLN, Indications: 60ml to irrigate baca once a week, Disp: , Rfl:     clopidogrel (PLAVIX) 75 mg tablet, Take 1 tablet (75 mg total) by mouth daily, Disp: 90 tablet, Rfl: 0    diltiazem (CARDIZEM SR) 90 mg 12 hr capsule, Take 2 capsules (180 mg total) by mouth every 12 (twelve) hours, Disp: 30 capsule, Rfl: 0    divalproex sodium (DEPAKOTE ER) 250 mg 24 hr tablet, Take 1 tablet (250 mg total) by mouth daily, Disp: 30 tablet, Rfl: 0    fenofibrate micronized (LOFIBRA) 67 MG capsule, Take 67 mg by mouth every morning before breakfast, Disp: , Rfl:     ferrous sulfate 325 (65 Fe) mg tablet, Take 325 mg by mouth 2 (two) times a day, Disp: , Rfl:     fluticasone furoate-vilanterol (BREO ELLIPTA), Inhale 1 puff daily, Disp: , Rfl:     furosemide (LASIX) 40 mg tablet, Take 1 tablet (40 mg total) by mouth daily, Disp: 90 tablet, Rfl: 3    Levothyroxine Sodium 88 MCG CAPS, Take 88 mcg by mouth every morning  , Disp: , Rfl:     metoprolol tartrate (LOPRESSOR) 50 mg tablet, Take 1 tablet (50 mg total) by mouth every 12 (twelve) hours, Disp: 60 tablet, Rfl: 0    OXYGEN-HELIUM IN, Inhale, Disp: , Rfl:     potassium chloride (K-DUR,KLOR-CON) 20 mEq tablet, Take 1 tablet (20 mEq total) by mouth daily, Disp: 15 tablet, Rfl: 0    Probiotic Product (ACIDOPHILUS/BIFIDUS PO), Take by mouth, Disp: , Rfl:     Sennosides-Docusate Sodium (SENNA PLUS PO), Take by mouth daily as needed, Disp: , Rfl:     warfarin (COUMADIN) 3 mg tablet, One tablet daily or as otherwise directed , Disp: 90 tablet, Rfl: 2    acetaminophen (TYLENOL) 500 mg tablet, Take 500 mg by mouth every 6 (six) hours as needed for mild pain, Disp: , Rfl:     diphenhydrAMINE (BENADRYL) 25 mg tablet, TAKE 1 TABLET THE EVENING PRIOR AND 1 TABLET THE MORNING OF PROCEDURE, Disp: 30 tablet, Rfl: 0    nitroglycerin (NITROSTAT) 0 4 mg SL tablet, Place 1 tablet (0 4 mg total) under the tongue every 5 (five) minutes as needed for chest pain, Disp: 90 tablet, Rfl: 0  Allergies   Allergen Reactions    Spiriva Handihaler [Tiotropium Bromide Monohydrate] Swelling    Ramipril Facial Swelling    Penicillins      unsure     Vitals:    11/08/18 1326   BP: 140/70   BP Location: Left arm   Patient Position: Sitting   Cuff Size: Adult   Pulse: 57   SpO2: 92%   Weight: 83 kg (183 lb)   Height: 5' 1" (1 549 m)       Labs:  Office Visit on 09/13/2018   Component Date Value    WBC 10/05/2018 5 35     RBC 10/05/2018 2 97*    Hemoglobin 10/05/2018 10 2*    Hematocrit 10/05/2018 32 1*    MCV 10/05/2018 108*    MCH 10/05/2018 34 3     MCHC 10/05/2018 31 8     RDW 10/05/2018 13 7     Platelets 07/73/2958 169     MPV 10/05/2018 11 9     Sodium 10/05/2018 147*    Potassium 10/05/2018 4 1     Chloride 10/05/2018 108     CO2 10/05/2018 35*    ANION GAP 10/05/2018 4     BUN 10/05/2018 38*    Creatinine 10/05/2018 1 89*    Glucose, Fasting 10/05/2018 122*    Calcium 10/05/2018 9 4     eGFR 10/05/2018 26    Admission on 09/07/2018, Discharged on 09/09/2018   Component Date Value    Sodium 09/07/2018 144     Potassium 09/07/2018 4 6     Chloride 09/07/2018 107     CO2 09/07/2018 31     ANION GAP 09/07/2018 6     BUN 09/07/2018 38*    Creatinine 09/07/2018 1 72*    Glucose 09/07/2018 189*    Glucose, Fasting 09/07/2018 189*    Calcium 09/07/2018 9 4     eGFR 09/07/2018 29     Protime 09/07/2018 15 1*    INR 09/07/2018 1 20*    Activated Clotting Time,* 09/07/2018 230*    Specimen Type 09/07/2018 ARTERIAL     Activated Clotting Time,* 09/07/2018 195*    Specimen Type 09/07/2018 ARTERIAL     Activated Clotting Time,* 09/07/2018 172*    Specimen Type 09/07/2018 ARTERIAL     Activated Clotting Time,* 09/07/2018 154*    Specimen Type 09/07/2018 ARTERIAL     ABO Grouping 09/07/2018 A     Rh Factor 09/07/2018 Positive     Antibody Screen 09/07/2018 Negative     Specimen Expiration Date 09/07/2018 [id number]     Protime 09/07/2018 16 3*    INR 09/07/2018 1 32*    WBC 09/07/2018 9 36     RBC 09/07/2018 3 10*    Hemoglobin 09/07/2018 10 4*    Hematocrit 09/07/2018 32 0*    MCV 09/07/2018 103*    MCH 09/07/2018 33 5     MCHC 09/07/2018 32 5     RDW 09/07/2018 13 3     Platelets 69/21/4165 154     MPV 09/07/2018 12 2     Sodium 09/07/2018 143     Potassium 09/07/2018 4 1     Chloride 09/07/2018 110*    CO2 09/07/2018 26     ANION GAP 09/07/2018 7     BUN 09/07/2018 36*    Creatinine 09/07/2018 1 53*    Glucose 09/07/2018 187*    Glucose, Fasting 09/07/2018 187*    Calcium 09/07/2018 8 8     eGFR 09/07/2018 34     Hemoglobin 09/07/2018 10 2*    Hematocrit 09/07/2018 32 0*    Hemoglobin 09/08/2018 9 5*    Hematocrit 09/08/2018 29 3*    POC Glucose 09/08/2018 134     WBC 09/08/2018 12 82*    RBC 09/08/2018 2 94*    Hemoglobin 09/08/2018 10 0*    Hematocrit 09/08/2018 31 5*    MCV 09/08/2018 107*    MCH 09/08/2018 34 0     MCHC 09/08/2018 31 7     RDW 09/08/2018 13 7     MPV 09/08/2018 12 3     Platelets 37/19/1025 153     nRBC 09/08/2018 0     Neutrophils Relative 09/08/2018 77*    Immat GRANS % 09/08/2018 1     Lymphocytes Relative 09/08/2018 14     Monocytes Relative 09/08/2018 8     Eosinophils Relative 09/08/2018 0     Basophils Relative 09/08/2018 0     Neutrophils Absolute 09/08/2018 9 84*  Immature Grans Absolute 09/08/2018 0 07     Lymphocytes Absolute 09/08/2018 1 84     Monocytes Absolute 09/08/2018 1 06     Eosinophils Absolute 09/08/2018 0 00     Basophils Absolute 09/08/2018 0 01     Sodium 09/08/2018 146*    Potassium 09/08/2018 4 0     Chloride 09/08/2018 112*    CO2 09/08/2018 28     ANION GAP 09/08/2018 6     BUN 09/08/2018 35*    Creatinine 09/08/2018 1 57*    Glucose 09/08/2018 130     Glucose, Fasting 09/08/2018 130*    Calcium 09/08/2018 9 4     eGFR 09/08/2018 33     Calcium, Ionized 09/08/2018 1 21     Magnesium 09/08/2018 2 5     POC Glucose 09/08/2018 138     Hemoglobin 09/08/2018 9 5*    Hematocrit 09/08/2018 30 0*    POC Glucose 09/08/2018 98     POC Glucose 09/08/2018 100     Hemoglobin 09/08/2018 9 2*    Hematocrit 09/08/2018 29 2*    Hemoglobin 09/09/2018 8 0*    Hematocrit 09/09/2018 25 8*    POC Glucose 09/09/2018 131     WBC 09/09/2018 7 87     RBC 09/09/2018 2 46*    Hemoglobin 09/09/2018 8 4*    Hematocrit 09/09/2018 26 8*    MCV 09/09/2018 109*    MCH 09/09/2018 34 1     MCHC 09/09/2018 31 3*    RDW 09/09/2018 13 8     MPV 09/09/2018 12 7     Platelets 08/56/5588 118*    nRBC 09/09/2018 0     Neutrophils Relative 09/09/2018 60     Immat GRANS % 09/09/2018 1     Lymphocytes Relative 09/09/2018 28     Monocytes Relative 09/09/2018 8     Eosinophils Relative 09/09/2018 2     Basophils Relative 09/09/2018 1     Neutrophils Absolute 09/09/2018 4 77     Immature Grans Absolute 09/09/2018 0 06     Lymphocytes Absolute 09/09/2018 2 17     Monocytes Absolute 09/09/2018 0 66     Eosinophils Absolute 09/09/2018 0 17     Basophils Absolute 09/09/2018 0 04     Sodium 09/09/2018 146*    Potassium 09/09/2018 4 3     Chloride 09/09/2018 112*    CO2 09/09/2018 30     ANION GAP 09/09/2018 4     BUN 09/09/2018 33*    Creatinine 09/09/2018 1 46*    Glucose 09/09/2018 106     Calcium 09/09/2018 8 6     eGFR 09/09/2018 36     Magnesium 09/09/2018 2 1     POC Glucose 09/09/2018 169*    Hemoglobin 09/09/2018 9 0*    Hematocrit 09/09/2018 29 2*    Protime 09/09/2018 15 0*    INR 09/09/2018 1 19*    POC Glucose 09/09/2018 198*     Imaging: No results found  Review of Systems:  Review of Systems   Constitutional: Negative for diaphoresis and fatigue  HENT: Negative for congestion and facial swelling  Eyes: Negative for photophobia and visual disturbance  Respiratory: Negative for chest tightness and shortness of breath  Cardiovascular: Negative for chest pain, palpitations and leg swelling  Right lower extremity claudication   Gastrointestinal: Negative for abdominal pain and nausea  Endocrine: Negative for cold intolerance and heat intolerance  Musculoskeletal: Negative for arthralgias and myalgias  Skin: Negative for pallor and rash  Neurological: Negative for dizziness and tremors  Psychiatric/Behavioral: Negative for sleep disturbance  The patient is not nervous/anxious  Physical Exam:  Physical Exam   Constitutional: She is oriented to person, place, and time  She appears well-developed and well-nourished  HENT:   Head: Normocephalic and atraumatic  Eyes: Pupils are equal, round, and reactive to light  Conjunctivae and EOM are normal    Neck: Normal range of motion  Neck supple  No JVD present  No thyromegaly present  Cardiovascular: Normal rate, regular rhythm, S1 normal, S2 normal, normal heart sounds and intact distal pulses  Exam reveals no gallop and no friction rub  No murmur heard  Pulses:       Carotid pulses are 2+ on the right side, and 2+ on the left side  1+ edema bilaterally   Pulmonary/Chest: Effort normal and breath sounds normal  No respiratory distress  She has no wheezes  She has no rales  Abdominal: Soft  Bowel sounds are normal  She exhibits no distension  There is no tenderness  There is no rebound and no guarding  Musculoskeletal: Normal range of motion   She exhibits no edema  Neurological: She is alert and oriented to person, place, and time  She has normal reflexes  No cranial nerve deficit  Skin: Skin is warm and dry  Psychiatric: She has a normal mood and affect  Discussion/Summary:  1   Right groin hematoma:  - resolved  - hemoglobin is increased  - no pseudoaneurysm      2   PAD, claudication:  Patient continues to have right leg claudication   left leg claudication has resolved  CONTINUE PLAVIX  SERUM CREATININE IS SIGNIFICANTLY IMPROVED  I DISCUSSED THE RISKS AND BENEFITS OF DOING RIGHT ILIAC AND FEMORAL ARTERY INTERVENTION FOR CLAUDICATION INCLUDING CONTRAST INDUCED NEPHROPATHY, BLEEDING  After understanding the risks and benefits, patient and family wants to proceed with intervention  Verbal consent was obtained  Will bring the patient will early for IV hydration and aggressively hydrate after the procedure      3   Chronic diastolic Congestive heart failure:  Currently euvolemic, last EF 55-65%   Continue Lasix   Low-salt diet, daily weights      4   Atrial fibrillation/flutter:  Continue Cardizem and Lopressor for rate control   Coumadin held given hematoma      5   Hypertension:  Stable, continue present regimen      6   Hyperlipidemia:  Continue statin      7    chronic kidney disease, serum creatinine is improved      Follow-up with me 1 week after intervention

## 2018-12-08 LAB
ANION GAP SERPL CALCULATED.3IONS-SCNC: 6 MMOL/L (ref 4–13)
BUN SERPL-MCNC: 29 MG/DL (ref 5–25)
CALCIUM SERPL-MCNC: 9.3 MG/DL (ref 8.3–10.1)
CHLORIDE SERPL-SCNC: 110 MMOL/L (ref 100–108)
CO2 SERPL-SCNC: 27 MMOL/L (ref 21–32)
CREAT SERPL-MCNC: 1.09 MG/DL (ref 0.6–1.3)
ERYTHROCYTE [DISTWIDTH] IN BLOOD BY AUTOMATED COUNT: 13.2 % (ref 11.6–15.1)
GFR SERPL CREATININE-BSD FRML MDRD: 51 ML/MIN/1.73SQ M
GLUCOSE SERPL-MCNC: 120 MG/DL (ref 65–140)
GLUCOSE SERPL-MCNC: 122 MG/DL (ref 65–140)
HCT VFR BLD AUTO: 31.5 % (ref 34.8–46.1)
HGB BLD-MCNC: 10 G/DL (ref 11.5–15.4)
INR PPP: 1.24 (ref 0.86–1.17)
MCH RBC QN AUTO: 33.4 PG (ref 26.8–34.3)
MCHC RBC AUTO-ENTMCNC: 31.7 G/DL (ref 31.4–37.4)
MCV RBC AUTO: 105 FL (ref 82–98)
PLATELET # BLD AUTO: 137 THOUSANDS/UL (ref 149–390)
PMV BLD AUTO: 12 FL (ref 8.9–12.7)
POTASSIUM SERPL-SCNC: 4 MMOL/L (ref 3.5–5.3)
PROTHROMBIN TIME: 15.7 SECONDS (ref 11.8–14.2)
RBC # BLD AUTO: 2.99 MILLION/UL (ref 3.81–5.12)
SODIUM SERPL-SCNC: 143 MMOL/L (ref 136–145)
WBC # BLD AUTO: 11.58 THOUSAND/UL (ref 4.31–10.16)

## 2018-12-08 PROCEDURE — 85610 PROTHROMBIN TIME: CPT | Performed by: SURGERY

## 2018-12-08 PROCEDURE — 99232 SBSQ HOSP IP/OBS MODERATE 35: CPT | Performed by: HOSPITALIST

## 2018-12-08 PROCEDURE — 80048 BASIC METABOLIC PNL TOTAL CA: CPT | Performed by: SURGERY

## 2018-12-08 PROCEDURE — G8979 MOBILITY GOAL STATUS: HCPCS

## 2018-12-08 PROCEDURE — 94760 N-INVAS EAR/PLS OXIMETRY 1: CPT

## 2018-12-08 PROCEDURE — 97163 PT EVAL HIGH COMPLEX 45 MIN: CPT

## 2018-12-08 PROCEDURE — 82948 REAGENT STRIP/BLOOD GLUCOSE: CPT

## 2018-12-08 PROCEDURE — 99231 SBSQ HOSP IP/OBS SF/LOW 25: CPT | Performed by: SURGERY

## 2018-12-08 PROCEDURE — 85027 COMPLETE CBC AUTOMATED: CPT | Performed by: SURGERY

## 2018-12-08 PROCEDURE — G8978 MOBILITY CURRENT STATUS: HCPCS

## 2018-12-08 RX ORDER — HEPARIN SODIUM 5000 [USP'U]/ML
5000 INJECTION, SOLUTION INTRAVENOUS; SUBCUTANEOUS EVERY 8 HOURS SCHEDULED
Status: DISCONTINUED | OUTPATIENT
Start: 2018-12-08 | End: 2018-12-09

## 2018-12-08 RX ADMIN — HEPARIN SODIUM 5000 UNITS: 5000 INJECTION INTRAVENOUS; SUBCUTANEOUS at 21:00

## 2018-12-08 RX ADMIN — CARBAMAZEPINE 100 MG: 100 TABLET, CHEWABLE ORAL at 15:29

## 2018-12-08 RX ADMIN — BUPROPION HYDROCHLORIDE 150 MG: 150 TABLET, EXTENDED RELEASE ORAL at 09:03

## 2018-12-08 RX ADMIN — HEPARIN SODIUM 5000 UNITS: 5000 INJECTION INTRAVENOUS; SUBCUTANEOUS at 09:03

## 2018-12-08 RX ADMIN — CARBAMAZEPINE 100 MG: 100 TABLET, CHEWABLE ORAL at 20:57

## 2018-12-08 RX ADMIN — CARBAMAZEPINE 100 MG: 100 TABLET, CHEWABLE ORAL at 09:03

## 2018-12-08 RX ADMIN — METOPROLOL TARTRATE 50 MG: 50 TABLET, FILM COATED ORAL at 09:02

## 2018-12-08 RX ADMIN — DILTIAZEM HYDROCHLORIDE 180 MG: 90 CAPSULE, EXTENDED RELEASE ORAL at 20:57

## 2018-12-08 RX ADMIN — WARFARIN SODIUM 3 MG: 1 TABLET ORAL at 17:54

## 2018-12-08 RX ADMIN — CLOPIDOGREL BISULFATE 75 MG: 75 TABLET ORAL at 09:02

## 2018-12-08 RX ADMIN — ATORVASTATIN CALCIUM 10 MG: 10 TABLET, FILM COATED ORAL at 09:02

## 2018-12-08 RX ADMIN — FENOFIBRATE 48 MG: 48 TABLET ORAL at 09:02

## 2018-12-08 RX ADMIN — ALLOPURINOL 300 MG: 300 TABLET ORAL at 09:02

## 2018-12-08 RX ADMIN — LEVOTHYROXINE SODIUM 88 MCG: 88 TABLET ORAL at 05:32

## 2018-12-08 RX ADMIN — DIVALPROEX SODIUM 250 MG: 250 TABLET, EXTENDED RELEASE ORAL at 09:02

## 2018-12-08 RX ADMIN — SODIUM CHLORIDE 50 ML/HR: 0.9 INJECTION, SOLUTION INTRAVENOUS at 15:31

## 2018-12-08 RX ADMIN — DILTIAZEM HYDROCHLORIDE 180 MG: 90 CAPSULE, EXTENDED RELEASE ORAL at 09:07

## 2018-12-08 RX ADMIN — FLUTICASONE FUROATE AND VILANTEROL TRIFENATATE 1 PUFF: 100; 25 POWDER RESPIRATORY (INHALATION) at 09:03

## 2018-12-08 RX ADMIN — METOPROLOL TARTRATE 50 MG: 50 TABLET, FILM COATED ORAL at 20:56

## 2018-12-08 NOTE — H&P
H&P- Vernadine Cane [birth year], 68 y o  female MRN: [id number]    Unit/Bed#: OR POOL Encounter: [id number]    Primary Care Provider: Onelia Renee DO   Date and time admitted to hospital: 12/7/2018  3:03 PM        * Peripheral artery disease ([facility] )   Assessment & Plan    Patent distal pulses bilaterally  Monitor vital signs  Neuro checks on lower extremities  Follow-up vascular surgeon  Pain control  Continue aspirin Plavix and lipid-lowering medication statin   Stage 3 chronic kidney disease ([facility] )   Assessment & Plan    Currently appears euvolemic  Continue to monitor  BUN creatinine 32/1 10     Ambulatory dysfunction   Assessment & Plan    PT/OT   Seizure disorder [facility])   Assessment & Plan    On Tegretol 100 mg t i d , Depakote  mg Q 24 hour tablet daily  Seizure precautions   Hyperlipidemia   Assessment & Plan    Fenofibrate daily  Lipitor 10 mg daily     Atrial flutter (HCC)   Assessment & Plan    Rate control with Lopressor 50 mg q 12 hours, diltiazem 180mg q 12 hours  Anticoagulation with warfarin tree mg q h s  Telemetry     Acute on chronic respiratory failure (HCC)   Assessment & Plan    Currently appears euvolemic  Continue furosemide 40 mg daily with potassium 20 mEq daily   HTN (hypertension)   Assessment & Plan    Blood pressure stable  Continue monitor    Hypothyroidism continue levothyroxine 88 mcg daily  Chronic iron deficiency-continue ferrous sulfate 325 mg daily  Coronary artery disease-continue meds  Chronic constipation continue bowel regimen         VTE Prophylaxis: recent angioplasty  / sequential compression device   Code Status: full code  POLST: POLST form is not discussed and not completed at this time  Discussion with family: no    Anticipated Length of Stay:  Patient will be admitted on an Inpatient basis with an anticipated length of stay of  > 2 midnights     Justification for Hospital Stay: s/p angioplasty by vascular surgery    Total Time for Visit, including Counseling / Coordination of Care: 45 minutes  Greater than 50% of this total time spent on direct patient counseling and coordination of care  Chief Complaint:   Transferred from [facility] for emergent angioplasty  History of Present Illness: Verna Barnes is a 68 y o  female who presents with past medical history of COPD-chronic hypoxic respiratory failure, obstructive sleep apnea, chronic diastolic heart failure, atrial fibrillation, atrial flutter, central hypertension, cognitive impairment, CKD stage 3, chronic indwelling Davis catheter due to neurogenic bladder  She has multiple history of SFA angioplasty for history of intermittent bilateral claudication  Patient was seen and evaluated at [address] [place] [address] she was evaluated by Beth Grimm for angioplasty, where the stent migrated into greater vessels necessitating urgent vascular intervention not available at [facility] [place]  Patient was status post left SFA angioplasty, which was complicated by groin hematoma  Patient was seen in the PACU upon recovery, review of system poorly obtainable due to post anesthesia making her somnolent    Review of Systems:    Review of Systems   Unable to perform ROS: Mental status change (Anesthesia)       Past Medical and Surgical History:     Past Medical History:   Diagnosis Date    Atrial fibrillation ([facility] )     Brain aneurysm     CAD (coronary artery disease)     Cardiac disease     had stents 12 years ago in [facility]    CHF (congestive heart failure) ([facility] )     CKD (chronic kidney disease)     COPD (chronic obstructive pulmonary disease) ([facility] )     Dementia     Dementia     Disease of thyroid gland     GERD (gastroesophageal reflux disease)     Hyperlipidemia     Hyperlipidemia     Hypertension     MI (myocardial infarction) ([facility] )     Migraine     Renal disorder     Seizures ([facility] )     Stroke [facility])        Past Surgical History:   Procedure Laterality Date    APPENDECTOMY  BLADDER TUMOR EXCISION      BRAIN SURGERY      1998  clip right frontal lobe    CARDIAC SURGERY      COLONOSCOPY      CORONARY STENT PLACEMENT      5 stents    EYE SURGERY      MANDIBLE FRACTURE SURGERY      TONSILLECTOMY      TUBAL LIGATION      UTERINE FIBROID SURGERY         Meds/Allergies:    Prior to Admission medications    Medication Sig Start Date End Date Taking?  Authorizing Provider   acetaminophen (TYLENOL) 500 mg tablet Take 500 mg by mouth every 6 (six) hours as needed for mild pain    Historical Provider, MD   albuterol (2 5 mg/3 mL) 0 083 % nebulizer solution Take 2 5 mg by nebulization every 6 (six) hours as needed for wheezing    Historical Provider, MD   albuterol (PROVENTIL HFA,VENTOLIN HFA) 90 mcg/act inhaler Inhale 2 puffs every 6 (six) hours as needed for wheezing    Historical Provider, MD   allopurinol (ZYLOPRIM) 300 mg tablet Take 300 mg by mouth every morning      Historical Provider, MD   atorvastatin (LIPITOR) 10 mg tablet Take 10 mg by mouth daily    Historical Provider, MD   buPROPion (WELLBUTRIN SR) 150 mg 12 hr tablet Take 150 mg by mouth daily    Historical Provider, MD   carBAMazepine (TEGretol) 100 mg chewable tablet Chew 100 mg 3 (three) times a day    Historical Provider, MD   Cholecalciferol (VITAMIN D) 2000 units CAPS Take 1 capsule by mouth daily      Historical Provider, MD   Citric Fn-Koplahjwgif-Ur Carb (RENACIDIN) SOLN Indications: 60ml to irrigate baca once a week    Historical Provider, MD   clopidogrel (PLAVIX) 75 mg tablet Take 1 tablet (75 mg total) by mouth daily 9/9/18   TANVI Coyle   diltiazem (CARDIZEM SR) 90 mg 12 hr capsule Take 2 capsules (180 mg total) by mouth every 12 (twelve) hours 5/12/18   Kenneth Quiles MD   diphenhydrAMINE (BENADRYL) 25 mg tablet TAKE 1 TABLET THE EVENING PRIOR AND 1 TABLET THE MORNING OF PROCEDURE 11/12/18   Evelyn Corona MD   divalproex sodium (DEPAKOTE ER) 250 mg 24 hr tablet Take 1 tablet (250 mg total) by mouth daily 4/19/18   Krystyna Frias MD   famotidine (PEPCID) 20 mg tablet Take one tablet the evening prior to procedure and one tablet the morning of procedure  11/12/18   Zahra Haines MD   fenofibrate micronized (LOFIBRA) 67 MG capsule Take 67 mg by mouth every morning before breakfast    Historical Provider, MD   ferrous sulfate 325 (65 Fe) mg tablet Take 325 mg by mouth 2 (two) times a day    Historical Provider, MD   fluticasone furoate-vilanterol (BREO ELLIPTA) Inhale 1 puff daily    Historical Provider, MD   furosemide (LASIX) 40 mg tablet Take 1 tablet (40 mg total) by mouth daily 10/12/18   Zahra Haines MD   Levothyroxine Sodium 88 MCG CAPS Take 88 mcg by mouth every morning      Historical Provider, MD   metoprolol tartrate (LOPRESSOR) 50 mg tablet Take 1 tablet (50 mg total) by mouth every 12 (twelve) hours 6/19/18   Monserrat Brown MD   nitroglycerin (NITROSTAT) 0 4 mg SL tablet Place 1 tablet (0 4 mg total) under the tongue every 5 (five) minutes as needed for chest pain 5/12/18   Monserrat Brown MD   OXYGEN-HELIUM IN Inhale    Historical Provider, MD   potassium chloride (K-DUR,KLOR-CON) 20 mEq tablet Take 1 tablet (20 mEq total) by mouth daily 4/18/18   Krystyna Frias MD   predniSONE 20 mg tablet Take 3 tablets the evening prior to procedure and 3 tablets the morning of the procedure  11/12/18   Zahra Haines MD   Probiotic Product (ACIDOPHILUS/BIFIDUS PO) Take by mouth    Historical Provider, MD   Sennosides-Docusate Sodium (SENNA PLUS PO) Take by mouth daily as needed    Historical Provider, MD   warfarin (COUMADIN) 3 mg tablet One tablet daily or as otherwise directed  7/20/18   Nir Morales PA-C     I have reviewed home medications using allscripts  Allergies:    Allergies   Allergen Reactions    Spiriva Handihaler [Tiotropium Bromide Monohydrate] Swelling    Ramipril Facial Swelling    Penicillins      unsure       Social History:     Marital Status:  Occupation: n/a  Patient Pre-hospital Living Situation:  Unknown  Patient Pre-hospital Level of Mobility:  Ambulates with assist  Patient Pre-hospital Diet Restrictions:  Carb controlled diet  Substance Use History:   History   Alcohol Use No     History   Smoking Status    Former Smoker    Quit date: 9/7/2007   Smokeless Tobacco    Never Used     History   Drug Use No       Family History:    Family History   Problem Relation Age of Onset    Heart disease Father     Parkinsonism Father     Macular degeneration Mother     Glaucoma Mother     Diabetes Brother     Heart disease Brother        Physical Exam:     Vitals:   Blood Pressure: 148/72 (12/07/18 1900)  Pulse: 72 (12/07/18 1900)  Temperature: 98 4 °F (36 9 °C) (12/07/18 1826)  Respirations: 20 (12/07/18 1900)  SpO2: 96 % (12/07/18 1900)    Physical Exam   Constitutional: She is oriented to person, place, and time  No distress  Morbidly obese  Thick/short neck   HENT:   Head: Normocephalic and atraumatic  Mouth/Throat: Oropharynx is clear and moist    Eyes: Pupils are equal, round, and reactive to light  Conjunctivae and EOM are normal    Neck: Normal range of motion  Neck supple  No JVD present  Cardiovascular: Normal rate and normal heart sounds  Exam reveals no gallop and no friction rub  No murmur heard  2+ distal pulses bilaterally DP   Pulmonary/Chest: Effort normal and breath sounds normal  No respiratory distress  She has no wheezes  She has no rales  Abdominal: Soft  Bowel sounds are normal  She exhibits no distension  There is no tenderness  There is no rebound  Bilateral groin-no hematoma observed at this time   Musculoskeletal: Normal range of motion  She exhibits no edema  Neurological: She is alert and oriented to person, place, and time  No cranial nerve deficit  Coordination normal    Skin: Skin is warm  No erythema  Psychiatric: She has a normal mood and affect             Additional Data:     Lab Results: I have personally reviewed pertinent reports  Results from last 7 days  Lab Units 12/07/18  1640   WBC Thousand/uL 12 96*   HEMOGLOBIN g/dL 11 0*   HEMATOCRIT % 34 0*   PLATELETS Thousands/uL 137*       Results from last 7 days  Lab Units 12/07/18  1639   SODIUM mmol/L 145   POTASSIUM mmol/L 3 7   CHLORIDE mmol/L 112*   CO2 mmol/L 24   BUN mg/dL 32*   CREATININE mg/dL 1 10   ANION GAP mmol/L 9   CALCIUM mg/dL 8 2*   GLUCOSE RANDOM mg/dL 166*       Results from last 7 days  Lab Units 12/07/18  0659   INR  1 17                   Imaging: I have personally reviewed pertinent reports  IR abdominal angiography / intervention    (Results Pending)       EKG, Pathology, and Other Studies Reviewed on Admission:   EKG: n/a  Allscripts / Epic Records Reviewed: Yes     ** Please Note: This note has been constructed using a voice recognition system   **

## 2018-12-08 NOTE — PROGRESS NOTES
Postop check    Subjective:  No acute distress  Resting comfortably in bed    Objective  Vitals:    12/07/18 2000 12/07/18 2043 12/07/18 2158 12/07/18 2300   BP: 149/63 (!) 174/79 146/64 119/56   BP Location:  Left arm  Left arm   Pulse: 74 81  67   Resp: 19 22 22   Temp:  (!) 97 4 °F (36 3 °C)  (!) 97 3 °F (36 3 °C)   TempSrc:  Oral  Oral   SpO2: 94% 94%     Height:  5' (1 524 m)         No acute distress  Regular rate and rhythm  Nonlabored respirations on 1L NC  Palp pedal pulses  Feet warm   Groin access sites hemostatic and without underlying hematoma    Assessment and plan:      Diet  IV fluids  Plavix  Anticoagulation  Neurovascular checks  Analgesia    Dot Mchugh MD

## 2018-12-08 NOTE — PHYSICAL THERAPY NOTE
Physical Therapy Initial Evaluation     12/08/18 8896   Note Type   Note type Eval only   Pain Assessment   Pain Assessment 0-10   Pain Score 8   Pain Type Acute pain   Pain Location Leg   Pain Orientation Right; Lower   Home Living   Type of Home [facility] personal care home )   Home Layout Stairs to enter with rails  (10 STeps to enter)   [address] [place] chair  (does not use)   Home Equipment Walker  (RW and rollator )   Prior Function   Level of [place] (mod I with rollator vs no AD)   Lives With Facility staff   Receives Help From Other (Comment)  (facility staff )   ADL Assistance Needs assistance  (assist shower, independent dressing)   IADLs Needs assistance   Falls in the last 6 months 0   Restrictions/Precautions   Weight Bearing Precautions Per Order No   Braces or Orthoses (n/a)   Other Precautions Cognitive; Fall Risk;Multiple lines;Telemetry; Bed Alarm   General   Additional Pertinent History daughter present, reports pt has been to rehab in the past and they did not have a good experience daughter strongly prefers pt to return to personal care home   Family/Caregiver Present Yes   Cognition   Overall Cognitive Status Impaired   Arousal/Participation Alert   Orientation Level Oriented to person   Memory Decreased short term memory;Decreased recall of recent events;Decreased recall of precautions   Following Commands Follows one step commands with increased time or repetition   Comments daughter reports this is pt's baseline cognitive status   RUE Assessment   RUE Assessment WFL   LUE Assessment   LUE Assessment WFL   RLE Assessment   RLE Assessment WFL  (not formally assessed, pt very distractible )   LLE Assessment   LLE Assessment WFL   Coordination   Movements are Fluid and Coordinated 1   Light Touch   RLE Light Touch Grossly intact   LLE Light Touch Grossly intact   Bed Mobility   Supine to Sit 4  Minimal assistance   Additional items Assist x 1   Transfers   Sit to Stand 4  Minimal assistance   Additional items Assist x 1;Verbal cues   Stand to Sit 4  Minimal assistance   Additional items Assist x 1;Verbal cues   Ambulation/Elevation   Gait pattern Decreased R stance; Improper Weight shift  (antalgic gait, reports cramping in R calf )   Gait Assistance 4  Minimal assist   Additional items Assist x 1;Verbal cues  (frequent VC for body position in RW and attention to task)   Assistive Device Rolling walker   Distance 40 ft    Stair Management Assistance Not tested   Balance   Static Sitting Fair +   Dynamic Sitting Fair   Static Standing Fair +   Dynamic Standing Fair   Ambulatory Fair   Endurance Deficit   Endurance Deficit Yes   Endurance Deficit Description fatigued with 40 ft of ambulation   Activity Tolerance   Activity Tolerance Patient limited by pain; Patient limited by fatigue  (R calf pain/cramping)   Nurse Made Aware TARIQ Montes present and aware   Assessment   Prognosis Good   Problem List Decreased endurance; Impaired balance;Decreased mobility; Impaired judgement;Decreased cognition;Decreased safety awareness;Pain   Assessment Pt is a 68year old female transferred to [facility] on [facility]/7/18 from [facility] s/p R SFA angioplasty, R EIA angioplasty and stent placement complicated by R EIA stent migration/fracture and dissection of SFA, now s/p R A gram with b/l kissing iliac stent placement  Pt has hx of PAD, CKA, seizure disorder, dementia  Daughter present t/o evaluation and provided majority of history  Pt lives at [facility] where she must negotiate 10 steps to enter the building, mod I with rollator vs no AD when she forgets her rollator, independent dressing, staff assists with shower  Pt currently presenting with impaired cognition/decreased safety awareness and attention to task, decreased balance, decreased cardiopulmonary stamina and increased pain affecting her mobility    Pt currently requiring min A for bed mobility, transfers, and ambulation for 40 ft using RW, primarily limited by significant cramping pain in R lower leg (RN present and aware)  Pt will benefit from continued skilled PT services while in the hospital to address the above impairments and maximize safety and independence with functional mobility  Daughter present and states she strongly prefers pt to return to her personal care home at d/c as she reports pt has had difficulty at rehab in the past   Pt would benefit from STR; however, if she is able to progress to a mod I-supervision level and tolerate 10 steps safely she may be able to return to her personal care home with home PT and increased assistance  PT will continue to follow to progress as able  Pt is unsafe for d/c to personal care home as of this session  Barriers to Discharge Inaccessible home environment   Barriers to Discharge Comments must negotiate 10 steps to enter her personal care home   Goals   Patient Goals "I want to get up and walk"   [facility] Expiration Date 12/18/18   Short Term Goal #1 1  Pt will perform all bed mobility at an independent level to facilitate OOB mobility 2  Pt will perform all functional transfers at a mod I level using RW vs rollator (per dtr pt typically refuses RW at home and will only use rollator) to facilitate increased safety upright mobility 3  Pt will ambulate 150 ft with RW vs rollator at a supervision level to allow for increased independence with mobility in personal care home 4  Pt will improve dynamic standing balance to Fair+/Good to decrease fall risk 5  Pt will negotiate 10 steps with supervision using LRAD and/or handrail PRN to allow pt to access her home environment safely   Treatment Day 0   Plan   Treatment/Interventions Functional transfer training;LE strengthening/ROM; Elevations; Therapeutic exercise; Endurance training;Cognitive reorientation;Patient/family training;Bed mobility; Equipment eval/education;Gait training;Spoke to nursing   PT Frequency (4-6/wk)   Recommendation Recommendation Other (Comment)  (STR vs personal care home with increased assist and home PT)   Equipment Recommended (use of RW at this time)   PT - OK to Discharge No   Additional Comments pt needs to be mod I-supervision level and negotiate 10 steps for safe d/c back to personal care home   Barthel Index   Feeding 10   Bathing 0   Grooming Score 0   Dressing Score 5   Bladder Score 0   Bowels Score 10   Toilet Use Score 5   Transfers (Bed/Chair) Score 10   Mobility (Level Surface) Score 0   Stairs Score 0   Barthel Index Score 40     Arlen Grajeda, PT

## 2018-12-08 NOTE — PROGRESS NOTES
Progress Note - Vascular Surgery  Rian July 68 y o  female MRN: [id number]  Unit/Bed#: [facility] 529-01 Encounter: [id number]    Assessment:  73F w/PAD and claudication s/p R SFA angioplasty, R EIA angioplasty and stent placement complicated by R EIA stent migration/fracture and dissection of SFA, now s/p R A gram with b/l kissing iliac stent placement  Plan:  - diet as tolerated  - restart plavix/coumadin today  - continue neurovascular checks  - trend Hgb daily  - wean O2  - PT/OT      Subjective/Objective   Subjective: doing well this AM, denies pain, just concerned about when bruising at groins will go away    Objective:    Blood pressure 143/64, pulse 68, temperature 98 3 °F (36 8 °C), temperature source Oral, resp  rate 17, height 5' (1 524 m), weight 85 1 kg (187 lb 9 8 oz), SpO2 96 %  ,Body mass index is 36 64 kg/m²  I/O last 24 hours: In: 8607 [P O :100;  I V :750; IV Piggyback:500]  Out: 1385 [Urine:1385]    Invasive Devices     Peripheral Intravenous Line            Peripheral IV 12/07/18 Right Arm less than 1 day          Drain            Urethral Catheter Double-lumen 20 Fr  -- days    Urethral Catheter 91 days                Physical Exam:   NAD, alert and oriented x3  Normocephalic, atraumatic  MMM, EOMI, PERRLA  Norm resp effort on RA  RRR  Abd soft, NT/ND  B/l groins with ecchymosis but no hematoma  Palp DP b/l  No calf tenderness or peripheral edema  Motor/sensation intact in distal extremities  CN grossly intact  -rash/lesions      Lab, Imaging and other studies:  Lab Results   Component Value Date    WBC 11 58 (H) 12/08/2018    HGB 10 0 (L) 12/08/2018    HCT 31 5 (L) 12/08/2018     (H) 12/08/2018     (L) 12/08/2018      Lab Results   Component Value Date    CALCIUM 9 3 12/08/2018    K 4 0 12/08/2018    CO2 27 12/08/2018     (H) 12/08/2018    BUN 29 (H) 12/08/2018    CREATININE 1 09 12/08/2018       VTE Pharmacologic Prophylaxis: Warfarin (Coumadin)  VTE Mechanical Prophylaxis: sequential compression device

## 2018-12-08 NOTE — PROGRESS NOTES
Patient has a small hematoma noted under her dressing on her right groin, Dr Reddy aware and at bedside, present from before, will continue to monitor

## 2018-12-08 NOTE — UTILIZATION REVIEW
Initial Clinical Review    Admission: Date/Time/Statement: 12/7/18 @ 1503 Transfer from [address] [place] [address] This Encounter   Procedures    Inpatient Admission     Standing Status:   Standing     Number of Occurrences:   1     Order Specific Question:   Admitting Physician     Answer:   Geremias Brown [[id number]]     Order Specific Question:   Level of Care     Answer:   Level 1 Stepdown [13]     Order Specific Question:   Estimated length of stay     Answer:   More than 2 Midnights     Order Specific Question:   Certification     Answer:   I certify that inpatient services are medically necessary for this patient for a duration of greater than two midnights  See H&P and MD Progress Notes for additional information about the patient's course of treatment  Chief Complaint: No chief complaint on file  History of Illness: Dylan Gonzalez is a 68 y o  female  She has multiple history of SFA angioplasty for history of intermittent bilateral claudication  Patient was seen and evaluated at [address] [place] [address] she was evaluated by King Saldana for angioplasty, where the stent migrated into greater vessels necessitating urgent vascular intervention not available at [facility]  Patient was status post left SFA angioplasty, which was complicated by groin hematoma  Patient was seen in the PACU upon recovery, review of system poorly obtainable due to post anesthesia making her somnolent  PE : 2+ distal pulses bilaterally DP, Bilateral groin-no hematoma observed at this time    12/07/18 85 1 kg (187 lb 9 8 oz)       Vital Signs (abnormal):   12/07/18 1826  98 4 °F (36 9 °C)  81  22  136/68  99 %  Simple mask  X  --   12/07/18 1541  --   54  16   177/61  98 %  Nasal cannula  --       Abnormal Labs/Diagnostic Test Results:12/7 pt inr  14 8 1 17cl   112, BUN creat  32  1 10, gluc  166      Past Medical/Surgical History:    Active Ambulatory Problems     Diagnosis Date Noted    HTN (hypertension) 12/10/2017    COPD (chronic obstructive pulmonary disease) ([facility] [address] 12/10/2017    Acute on chronic respiratory failure ([address] ) 03/22/2018    Atrial flutter ([facility] [address] ) 03/22/2018    Back pain 03/22/2018    Dementia 03/23/2018    Hyperlipidemia 03/23/2018    UTI (urinary tract infection) 03/24/2018    Seizure disorder ([address] ) 03/24/2018    RSV infection 03/24/2018    Ambulatory dysfunction 03/29/2018    Hypernatremia 04/13/2018    Stage 3 chronic kidney disease ([facility] [address] ) 04/13/2018    Bilateral leg edema 04/13/2018    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 04/18/2018    JESENIA (obstructive sleep apnea) 05/04/2018    Intermittent claudication of right lower extremity due to atherosclerosis ([facility] [address] ) 05/04/2018    Atrial fibrillation ([facility] [address] ) 05/07/2018    SOB (shortness of breath) 06/17/2018    Chronic indwelling Davis catheter 06/17/2018    Bradycardia 06/18/2018    Chronic hypoxemic respiratory failure ([address] ) 06/18/2018    Essential hypertension 09/09/2018    Peripheral artery disease ([facility] [address] ) 09/09/2018    Hematoma 09/09/2018    Contrast dye induced nephropathy 10/12/2018     Past Medical History:   Diagnosis Date    Atrial fibrillation ([facility] [address] )     Brain aneurysm     CAD (coronary artery disease)     Cardiac disease     CHF (congestive heart failure) (HCC)     CKD (chronic kidney disease)     COPD (chronic obstructive pulmonary disease) (HCC)     Dementia     Dementia     Disease of thyroid gland     GERD (gastroesophageal reflux disease)     Hyperlipidemia     Hyperlipidemia     Hypertension     MI (myocardial infarction) ([facility] [address] )     Migraine     Renal disorder     Seizures ([facility] [address] )     Stroke [facility])        Admitting Diagnosis: Ischemic leg [I99 8]    Age/Sex: 68 y o  female    Assessment/Plan:        * Peripheral artery disease ([facility] [address] )   Assessment & Plan     Patent distal pulses bilaterally  Monitor vital signs  Neuro checks on lower extremities  Follow-up vascular surgeon  Pain control  Continue aspirin Plavix and lipid-lowering medication statin   Stage 3 chronic kidney disease (HCC)   Assessment & Plan     Currently appears euvolemic  Continue to monitor  BUN creatinine 32/1 10      Ambulatory dysfunction   Assessment & Plan     PT/OT   Seizure disorder [facility])   Assessment & Plan     On Tegretol 100 mg t i d , Depakote  mg Q 24 hour tablet daily  Seizure precautions   Hyperlipidemia   Assessment & Plan     Fenofibrate daily  Lipitor 10 mg daily      Atrial flutter (HCC)   Assessment & Plan     Rate control with Lopressor 50 mg q 12 hours, diltiazem 180mg q 12 hours  Anticoagulation with warfarin tree mg q h s  Telemetry      Acute on chronic respiratory failure (HCC)   Assessment & Plan     Currently appears euvolemic  Continue furosemide 40 mg daily with potassium 20 mEq daily   HTN (hypertension)   Assessment & Plan     Blood pressure stable  Continue monitor     Hypothyroidism continue levothyroxine 88 mcg daily  Chronic iron deficiency-continue ferrous sulfate 325 mg daily  Coronary artery disease-continue meds  Chronic constipation continue bowel regimen            VTE Prophylaxis: recent angioplasty  / sequential compression device   Code Status: full code  POLST: POLST form is not discussed and not completed at this time  Discussion with family: no     Anticipated Length of Stay:  Patient will be admitted on an Inpatient basis with an anticipated length of stay of  > 2 midnights     Justification for Hospital Stay: s/p angioplasty by vascular surgery   Admission Orders:  Scheduled Meds:   Current Facility-Administered Medications:  albuterol 2 puff Inhalation Q6H PRN     albuterol 2 5 mg Nebulization Q6H PRN     allopurinol 300 mg Oral QAM     atorvastatin 10 mg Oral Daily     buPROPion 150 mg Oral Daily     carBAMazepine 100 mg Oral TID     clopidogrel 75 mg Oral Daily     diltiazem 180 mg Oral Q12H [facility]     divalproex sodium 250 mg Oral Daily     fenofibrate 48 mg Oral Daily     fluticasone-vilanterol 1 puff Inhalation Daily     heparin (porcine) 5,000 Units Subcutaneous Q8H [address]     lactated ringers 50 mL/hr Intravenous Continuous     levothyroxine 88 mcg Oral Early Morning     metoprolol tartrate 50 mg Oral Q12H [address]     ondansetron 4 mg Intravenous Q6H PRN     senna-docusate sodium 1 tablet Oral Daily PRN     sodium chloride 50 mL/hr Intravenous Continuous  Last Rate: 50 mL/hr (12/07/18 2006)   warfarin 3 mg Oral Daily (warfarin)         Vascular post op note  12/7  Assessment and plan:     Diet  IV fluids  Plavix  Anticoagulation  Neurovascular checks  Analgesia    Vascular sx note  12/8   Assessment:  73F w/PAD and claudication s/p R SFA angioplasty, R EIA angioplasty and stent placement complicated by R EIA stent migration/fracture and dissection of SFA, now s/p R A gram with b/l kissing iliac stent placement    Plan:  - diet as tolerated  - restart plavix/coumadin today  - continue neurovascular checks  - trend Hgb daily  - wean O2  - PT/OT

## 2018-12-08 NOTE — SOCIAL WORK
CM met with pt and dtr Leeann Velazquez  Pt is a resident of [facility]  Goal is for pt to return there at d/c  Pt ambulates with a RW but has a tendency to not use it  Pt requires assistance with ADLs due to memory loss and will not remember to perform hygiene  Pt has O2 concentrator and portables through [facility] and [employer]  Pt has prior SN[facility]  Dtr reports that she does not want pt to d/c to SNF even if recommended  Pt has received HHC in past at [place] but dtr does not know which agency the [facility] contracts with  Rx are filled through [facility] in [facility]  Dtr reports that she is POA  Dtr states that pt has prior diagnosis of dementia and that she was deemed incompetent over 20 years ago  Dtr requests that no paperwork be given to pt to be signed due to her not being able to understand or comprehend them  Dtr will transport pt at d/c   CM spoke to Nayana Orantes at [facility]  They require all new prescriptions to come back with pt so they can fill them  CM reviewed d/c planning process including the following: identifying help at home, patient preference for d/c planning needs, Discharge Lounge, [facility] Meds to Bed program, availability of treatment team to discuss questions or concerns patient and/or family may have regarding understanding medications and recognizing signs and symptoms once discharged  CM also encouraged patient to follow up with all recommended appointments after discharge  Patient advised of importance for patient and family to participate in managing patients medical well being

## 2018-12-08 NOTE — ASSESSMENT & PLAN NOTE
Status post right lower extremity angiogram/intervention  Transferred to [facility] for vascular intervention Anticoagulation recommended as per vascular surgery-restart Coumadin 3 mg q h s    Restart antiplatelet therapy with Plavix  Medically stable to discharge from vascular standpoint  Awaiting PT OT evaluation

## 2018-12-08 NOTE — PLAN OF CARE
Problem: PHYSICAL THERAPY ADULT  Goal: Performs mobility at highest level of function for planned discharge setting  See evaluation for individualized goals  Treatment/Interventions: Functional transfer training, LE strengthening/ROM, Elevations, Therapeutic exercise, Endurance training, Cognitive reorientation, Patient/family training, Bed mobility, Equipment eval/education, Gait training, Spoke to nursing  Equipment Recommended:  (use of RW at this time)       See flowsheet documentation for full assessment, interventions and recommendations  Prognosis: Good  Problem List: Decreased endurance, Impaired balance, Decreased mobility, Impaired judgement, Decreased cognition, Decreased safety awareness, Pain  Assessment: Pt is a 68year old female [facility] from [facility] s/p R SFA angioplasty, R EIA angioplasty and stent placement complicated by R EIA stent migration/fracture and dissection of SFA, now s/p R A gram with b/l kissing iliac stent placement  Pt has hx of PAD, CKA, seizure disorder, dementia  Daughter present t/o evaluation and provided majority of history  Pt lives at [facility] where she must negotiate 10 steps to enter the building, mod I with rollator vs no AD when she forgets her rollator, independent dressing, staff assists with shower  Pt currently presenting with impaired cognition/decreased safety awareness and attention to task, decreased balance, decreased cardiopulmonary stamina and increased pain affecting her mobility  Pt currently requiring min A for bed mobility, transfers, and ambulation for 40 ft using RW, primarily limited by significant cramping pain in R lower leg (RN present and aware)  Pt will benefit from continued skilled PT services while in the hospital to address the above impairments and maximize safety and independence with functional mobility    Daughter present and states she strongly prefers pt to return to her personal care home at d/c as she reports pt has had difficulty at rehab in the past   Pt would benefit from STR; however, if she is able to progress to a mod I-supervision level and tolerate 10 steps safely she may be able to return to her personal care home with home PT and increased assistance  PT will continue to follow to progress as able  Pt is unsafe for d/c to personal care home as of this session  Barriers to Discharge: Inaccessible home environment  Barriers to Discharge Comments: must negotiate 10 steps to enter her personal care home  Recommendation: Other (Comment) (STR vs personal care home with increased assist and home PT)     PT - OK to Discharge: No    See flowsheet documentation for full assessment       Seble Colon, PT

## 2018-12-08 NOTE — PROGRESS NOTES
Progress Note Vianey Morfin [birth year], 68 y o  female MRN: [id number]    Unit/Bed#: [facility] 529-01 Encounter: [id number]    Primary Care Provider: Keven Mcneal DO   Date and time admitted to hospital: 12/7/2018  3:03 PM        * Peripheral artery disease [facility])   Assessment & Plan    Status post right lower extremity angiogram/intervention  Transferred to [facility] for vascular intervention Anticoagulation recommended as per vascular surgery-restart Coumadin 3 mg q h s  Restart antiplatelet therapy with Plavix  Medically stable to discharge from vascular standpoint  Awaiting PT OT evaluation     Stage 3 chronic kidney disease ([facility] [address] )   Assessment & Plan    Creatinine 1 09  Renal function stable  Continues to be euvolemic, continue monitor     Ambulatory dysfunction   Assessment & Plan    Awaiting PT OT evaluation to discharge patient     Seizure disorder ([facility] [address] )   Assessment & Plan    No seizure activity  Continue antiepileptic medications     Hyperlipidemia   Assessment & Plan    Continue statin     Atrial flutter (HCC)   Assessment & Plan    Heart rate controlled continue monitor     Acute on chronic respiratory failure (HCC)   Assessment & Plan    Stable     HTN (hypertension)   Assessment & Plan    /61  Continue monitoring  Continue home meds         VTE Pharmacologic Prophylaxis:   Pharmacologic: Warfarin (Coumadin)  Mechanical VTE Prophylaxis in Place: Yes    Patient Centered Rounds: I have performed bedside rounds with nursing staff today  Discussions with Specialists or Other Care Team Provider: yes    Education and Discussions with Family / Patient: yes    Time Spent for Care: 45 minutes  More than 50% of total time spent on counseling and coordination of care as described above      Current Length of Stay: 1 day(s)    Current Patient Status: Inpatient   Certification Statement: The patient will continue to require additional inpatient hospital stay due to post op recovery, OT eval pending    Discharge Plan: dc tomorrow to personal care home     Code Status: Level 1 - Full Code      Subjective:   Reported weakness in her legs  ROS neg otherwise  Objective:     Vitals:   Temp (24hrs), Av 2 °F (36 8 °C), Min:97 3 °F (36 3 °C), Max:98 9 °F (37 2 °C)    Temp:  [97 3 °F (36 3 °C)-98 9 °F (37 2 °C)] 98 5 °F (36 9 °C)  HR:  [51-81] 55  Resp:  [16-22] 18  BP: (112-174)/(56-87) 117/61  SpO2:  [94 %-99 %] 97 %  Body mass index is 36 64 kg/m²  Input and Output Summary (last 24 hours): Intake/Output Summary (Last 24 hours) at 18 1621  Last data filed at 18 1503   Gross per 24 hour   Intake          1895 83 ml   Output             1585 ml   Net           310 83 ml       Physical Exam:     Physical Exam   Constitutional: She is oriented to person, place, and time  No distress  HENT:   Head: Normocephalic and atraumatic  Mouth/Throat: Oropharynx is clear and moist    Eyes: Pupils are equal, round, and reactive to light  EOM are normal    Neck: Normal range of motion  Neck supple  No JVD present  Cardiovascular: Normal rate and normal heart sounds  Exam reveals no gallop and no friction rub  No murmur heard  Pulmonary/Chest: Effort normal and breath sounds normal    Abdominal: Soft  Bowel sounds are normal  She exhibits no distension  There is no tenderness  Musculoskeletal: Normal range of motion  She exhibits no edema  Neurological: She is alert and oriented to person, place, and time  No cranial nerve deficit  Psychiatric: She has a normal mood and affect           Additional Data:     Labs:      Results from last 7 days  Lab Units 18  0535   WBC Thousand/uL 11 58*   HEMOGLOBIN g/dL 10 0*   HEMATOCRIT % 31 5*   PLATELETS Thousands/uL 137*       Results from last 7 days  Lab Units 18  0535   SODIUM mmol/L 143   POTASSIUM mmol/L 4 0   CHLORIDE mmol/L 110*   CO2 mmol/L 27   BUN mg/dL 29*   CREATININE mg/dL 1 09   ANION GAP mmol/L 6   CALCIUM mg/dL 9 3   GLUCOSE RANDOM mg/dL 122       Results from last 7 days  Lab Units 12/08/18  0559   INR  1 24*                       * I Have Reviewed All Lab Data Listed Above  * Additional Pertinent Lab Tests Reviewed:  [address] Admission Reviewed    Imaging:    Imaging Reports Reviewed Today     Recent Cultures (last 7 days):           Last 24 Hours Medication List:     Current Facility-Administered Medications:  albuterol 2 puff Inhalation Q6H PRN Spencer Reddy MD    albuterol 2 5 mg Nebulization Q6H PRN Spencer Reddy MD    allopurinol 300 mg Oral Isauro Urbano Reddy MD    atorvastatin 10 mg Oral Daily Spencer Reddy MD    buPROPion 150 mg Oral Daily Spencer Reddy MD    carBAMazepine 100 mg Oral TID Spencer Reddy MD    clopidogrel 75 mg Oral Daily Vanesa Ortiz, MD    diltiazem 180 mg Oral Q12H [address] [facility] [address]    divalproex sodium 250 mg Oral Daily Vanesa Ortiz MD    fenofibrate 48 mg Oral Daily Vanesa Ortiz MD    fluticasone-vilanterol 1 puff Inhalation Daily Vanesa Ortiz MD    heparin (porcine) 5,000 Units Subcutaneous Q8H Lana Milan MD    lactated ringers 50 mL/hr Intravenous Continuous Santos Phillips CRNA    levothyroxine 88 mcg Oral Early Morning Spencer Reddy MD    metoprolol tartrate 50 mg Oral Q12H [address] [facility], MD    ondansetron 4 mg Intravenous Q6H PRN Vanesa Ortiz MD    senna-docusate sodium 1 tablet Oral Daily PRN Vanesa Ortiz MD    sodium chloride 50 mL/hr Intravenous Continuous Vanesa Ortiz MD Last Rate: 50 mL/hr (12/08/18 1531)   warfarin 3 mg Oral Daily (warfarin) Vanesa Ortiz MD      Facility-Administered Medications Ordered in Other Encounters:  clopidogrel   Code/Trauma/Sedation Ovidio Mitchell MD    diphenhydrAMINE   Code/Trauma/Sedation Ovidio Mitchell MD    fentanyl citrate (PF)  Intravenous Code/Trauma/Sedation Ovidio Mitchell MD    heparin (porcine)   Code/Trauma/Sedation Continuous Ovidio Mitchell MD Last Rate: 12 Units/kg/hr (12/07/18 1332)   heparin (porcine)  Intra-arterial Code/Trauma/Sedation Ovidio Willis MD    hydrALAZINE  Intravenous Code/Trauma/Sedation Ovidio Willis MD    lidocaine   Code/Trauma/Sedation Ovidio Willis MD    midazolam   Code/Trauma/Sedation Ovidio Willis MD         Today, Patient Was Seen By: Jess Underwood MD    ** Please Note: Dictation voice to text software may have been used in the creation of this document   **

## 2018-12-08 NOTE — RESPIRATORY THERAPY NOTE
RT Protocol Note  Leafy Even 68 y o  female MRN: [id number]  Unit/Bed#: [facility] 529-01 Encounter: [id number]    Assessment    Principal Problem:    Peripheral artery disease ([place] [address] )  Active Problems:    HTN (hypertension)    Acute on chronic respiratory failure (HCC)    Atrial flutter (HCC)    Hyperlipidemia    Seizure disorder (HCC)    Ambulatory dysfunction    Stage 3 chronic kidney disease ([place] [address] )      Home Pulmonary Medications:  albuteral 2 5mg/nss prn and an albuteral mdi prn       Past Medical History:   Diagnosis Date    Atrial fibrillation ([place] [address] )     Brain aneurysm     CAD (coronary artery disease)     Cardiac disease     had stents 12 years ago in [facility]    CHF (congestive heart failure) ([place] [address] )     CKD (chronic kidney disease)     [facility]     Dementia     Dementia     Disease of thyroid gland     GERD (gastroesophageal reflux disease)     Hyperlipidemia     Hyperlipidemia     Hypertension     MI (myocardial infarction) ([place] [address] )     Migraine     Renal disorder     Seizures ([place] [address] )     Stroke ([place] [address] )      Social History     Social History    Marital status:      Spouse name: N/A    Number of children: 3    Years of education: N/A     Occupational History    retired      Social History Main Topics    Smoking status: Former Smoker     Quit date: 9/7/2007    Smokeless tobacco: Never Used    Alcohol use No    Drug use: No    Sexual activity: Not Asked     Other Topics Concern    None     Social History Narrative    None       Subjective         Objective    Physical Exam:   Assessment Type: (P) Assess only  General Appearance: (P) Awake  Respiratory Pattern: (P) Normal  Chest Assessment: (P) Chest expansion symmetrical  Bilateral Breath Sounds: (P) Clear, Diminished  R Breath Sounds: (P) Clear, Diminished  L Breath Sounds: (P) Clear, Diminished    Vitals:  Blood pressure 146/64, pulse 81, temperature (!) 97 4 °F (36 3 °C), temperature source Oral, resp  rate 22, height 5' (1 524 m), SpO2 94 %  Imaging and other studies: I have personally reviewed pertinent reports  Plan    Respiratory Plan: (P) Home Bronchodilator Patient pathway        Resp Comments: (P) Pt evaluated per resp protocol  Pt does have a hx of copd for which she uses an albuteral mdi and albuteral 2 5mg/nss udn prn @ home  Will continue physician's order of albuteral 2 5mg/nss q4prn while she remains inpatient

## 2018-12-08 NOTE — PERIOPERATIVE NURSING NOTE
VSS, pt denies pain or nausea, assessment unchanged, report called, no questions, pt transferred to floor via bed with monitor

## 2018-12-09 ENCOUNTER — APPOINTMENT (INPATIENT)
Dept: NON INVASIVE DIAGNOSTICS | Facility: HOSPITAL | Age: 73
DRG: 252 | End: 2018-12-09
Payer: MEDICARE

## 2018-12-09 PROCEDURE — 99231 SBSQ HOSP IP/OBS SF/LOW 25: CPT | Performed by: SURGERY

## 2018-12-09 PROCEDURE — 97530 THERAPEUTIC ACTIVITIES: CPT

## 2018-12-09 PROCEDURE — 93970 EXTREMITY STUDY: CPT | Performed by: SURGERY

## 2018-12-09 PROCEDURE — 93925 LOWER EXTREMITY STUDY: CPT

## 2018-12-09 PROCEDURE — 97110 THERAPEUTIC EXERCISES: CPT

## 2018-12-09 PROCEDURE — 94760 N-INVAS EAR/PLS OXIMETRY 1: CPT

## 2018-12-09 PROCEDURE — 93970 EXTREMITY STUDY: CPT

## 2018-12-09 PROCEDURE — 99232 SBSQ HOSP IP/OBS MODERATE 35: CPT | Performed by: HOSPITALIST

## 2018-12-09 PROCEDURE — 93923 UPR/LXTR ART STDY 3+ LVLS: CPT

## 2018-12-09 RX ORDER — WARFARIN SODIUM 5 MG/1
10 TABLET ORAL
Status: DISCONTINUED | OUTPATIENT
Start: 2018-12-09 | End: 2018-12-10

## 2018-12-09 RX ORDER — IBUPROFEN 400 MG/1
400 TABLET ORAL ONCE
Status: COMPLETED | OUTPATIENT
Start: 2018-12-09 | End: 2018-12-09

## 2018-12-09 RX ADMIN — FENOFIBRATE 48 MG: 48 TABLET ORAL at 08:29

## 2018-12-09 RX ADMIN — CARBAMAZEPINE 100 MG: 100 TABLET, CHEWABLE ORAL at 08:29

## 2018-12-09 RX ADMIN — METOPROLOL TARTRATE 50 MG: 50 TABLET, FILM COATED ORAL at 22:14

## 2018-12-09 RX ADMIN — FLUTICASONE FUROATE AND VILANTEROL TRIFENATATE 1 PUFF: 100; 25 POWDER RESPIRATORY (INHALATION) at 08:30

## 2018-12-09 RX ADMIN — DIVALPROEX SODIUM 250 MG: 250 TABLET, EXTENDED RELEASE ORAL at 08:28

## 2018-12-09 RX ADMIN — ALLOPURINOL 300 MG: 300 TABLET ORAL at 08:28

## 2018-12-09 RX ADMIN — DILTIAZEM HYDROCHLORIDE 180 MG: 90 CAPSULE, EXTENDED RELEASE ORAL at 22:14

## 2018-12-09 RX ADMIN — IBUPROFEN 400 MG: 400 TABLET, FILM COATED ORAL at 14:43

## 2018-12-09 RX ADMIN — DILTIAZEM HYDROCHLORIDE 180 MG: 90 CAPSULE, EXTENDED RELEASE ORAL at 08:29

## 2018-12-09 RX ADMIN — HEPARIN SODIUM 5000 UNITS: 5000 INJECTION INTRAVENOUS; SUBCUTANEOUS at 14:24

## 2018-12-09 RX ADMIN — CLOPIDOGREL BISULFATE 75 MG: 75 TABLET ORAL at 08:29

## 2018-12-09 RX ADMIN — LEVOTHYROXINE SODIUM 88 MCG: 88 TABLET ORAL at 05:47

## 2018-12-09 RX ADMIN — CARBAMAZEPINE 100 MG: 100 TABLET, CHEWABLE ORAL at 22:12

## 2018-12-09 RX ADMIN — HEPARIN SODIUM 5000 UNITS: 5000 INJECTION INTRAVENOUS; SUBCUTANEOUS at 05:47

## 2018-12-09 RX ADMIN — ATORVASTATIN CALCIUM 10 MG: 10 TABLET, FILM COATED ORAL at 08:29

## 2018-12-09 RX ADMIN — METOPROLOL TARTRATE 50 MG: 50 TABLET, FILM COATED ORAL at 08:28

## 2018-12-09 RX ADMIN — BUPROPION HYDROCHLORIDE 150 MG: 150 TABLET, EXTENDED RELEASE ORAL at 08:29

## 2018-12-09 NOTE — SOCIAL WORK
PT is recommending SNF at d/c  Dtr and pt are in agreement with recommendation and choice is 1) [facility] 2) [address] [place] [address]  Dtr will continue to review list for additional choice

## 2018-12-09 NOTE — PLAN OF CARE
Problem: PHYSICAL THERAPY ADULT  Goal: Performs mobility at highest level of function for planned discharge setting  See evaluation for individualized goals  Treatment/Interventions: Functional transfer training, LE strengthening/ROM, Elevations, Therapeutic exercise, Endurance training, Cognitive reorientation, Patient/family training, Bed mobility, Equipment eval/education, Gait training, Spoke to nursing  Equipment Recommended:  (use of RW at this time)       See flowsheet documentation for full assessment, interventions and recommendations  Prognosis: Good  Problem List: Decreased endurance, Impaired balance, Decreased mobility, Impaired judgement, Decreased cognition, Decreased safety awareness, Pain  Assessment: Pt was agreeable to PT treatment session  Per RN, pt was max A x2 for stand pivot transfer from the bed to the chair in the AM   On arrival, pt was seated in bedside chair with alarm on  Room was set up for sit to stand transfer and attempted short distance ambulation  On standing, pt reported intense pain of the RLE and tolerated standing ~20 seconds before needing to sit  RLE was assessed and noted to be warm to the touch with swelling and tenderness with palpation of the calf  RN and MD were notified  Pt was unable to tolerate ambulation and transfers at this time  Therex for the LLE were performed seated  RLE therex was held until assessed by physician  At this time, pt is unable to tolerate ambulation and is requiring mod A for transfers  She is unsafe for d/c to personal care home and would benefit from STR in order to return to [facility]  Barriers to Discharge: Inaccessible home environment  Barriers to Discharge Comments: 10 ERON   Recommendation:  (Rehab)     PT - OK to Discharge: Yes    See flowsheet documentation for full assessment

## 2018-12-09 NOTE — PHYSICIAN ADVISOR
Current patient class: Inpatient  The patient is currently on Hospital Day: 2 at [address] [place] [address]        The patient is  documented to require at least a 2nd midnight in the hospital  As such the patient is  expected to satisfy the 2 midnight benchmark and is therefore eligible for inpatient admission  After review of the relevant documentation, labs, vital signs and test results, the patient is appropriate for INPATIENT ADMISSION  Admission to the hospital as an inpatient is a complex decision making process which requires the practitioner to consider the patients presenting complaint, history and physical examination and all relevant testing  With this in mind, in this case, the patient was deemed appropriate for INPATIENT ADMISSION  After review of the documentation and testing available at the time of the admission I concur with this clinical determination of medical necessity  Rationale is as follows: The patient is a 68 yrs Female who presented to the ED at 12/7/2018  3:03 PM - Transferred to [facility] after she underwent RLE agram with misplaced R EIA stent and aortoiliac dissection - after transfer to [facility] she underwent angiogram which showed initial stent fracture placement of stent and R EIA distal stent extension for dissection   She was on IV heparin infusion which was stopped on 12/8/18 and started on coumadin   PT/OT has been consulted for safe discharge planning and consultation is pending           The patients vitals on arrival were ED Triage Vitals   Temperature Pulse Respirations Blood Pressure SpO2   12/07/18 1826 12/07/18 1541 12/07/18 1541 12/07/18 1541 12/07/18 1541   98 4 °F (36 9 °C) (!) 54 16 (!) 177/61 98 %      Temp Source Heart Rate Source Patient Position - Orthostatic VS BP Location FiO2 (%)   12/07/18 2043 12/07/18 1541 12/07/18 2043 12/07/18 2043 --   Oral Monitor Lying Left arm       Pain Score       12/07/18 2015       No Pain Past Medical History:   Diagnosis Date    Atrial fibrillation ([facility] )     Brain aneurysm     CAD (coronary artery disease)     Cardiac disease     had stents 12 years ago in [facility]    CHF (congestive heart failure) ([facility] )     CKD (chronic kidney disease)     COPD (chronic obstructive pulmonary disease) (HCC)     Dementia     Dementia     Disease of thyroid gland     GERD (gastroesophageal reflux disease)     Hyperlipidemia     Hyperlipidemia     Hypertension     MI (myocardial infarction) ([facility] )     Migraine     Renal disorder     Seizures ([place] [id number] )     Stroke [facility])      Past Surgical History:   Procedure Laterality Date    APPENDECTOMY      BLADDER TUMOR EXCISION      BRAIN SURGERY      1998  clip right frontal lobe    CARDIAC SURGERY      COLONOSCOPY      CORONARY STENT PLACEMENT      5 stents    EYE SURGERY      MANDIBLE FRACTURE SURGERY      TONSILLECTOMY      TUBAL LIGATION      UTERINE FIBROID SURGERY         The patient was admitted to the hospital at 1503 on 12/7/18 for the following diagnosis:  Ischemic leg [I99 8]    Consults have been placed to:   IP CONSULT TO INTERNAL MEDICINE  IP CONSULT TO CASE MANAGEMENT    Vitals:    12/08/18 1121 12/08/18 1503 12/08/18 1900 12/08/18 1925   BP: 112/56 117/61 142/66    BP Location:       Pulse: (!) 51 55 60    Resp: 16 18 20    Temp: 98 9 °F (37 2 °C) 98 5 °F (36 9 °C) 98 2 °F (36 8 °C)    TempSrc: Oral Oral Oral    SpO2: 96% 97% 96% 98%   Weight:       Height:           Most recent labs:    Recent Labs      12/08/18   0535  12/08/18   0559   WBC  11 58*   --    HGB  10 0*   --    HCT  31 5*   --    PLT  137*   --    K  4 0   --    CALCIUM  9 3   --    BUN  29*   --    CREATININE  1 09   --    INR   --   1 24*       Scheduled Meds:  Current Facility-Administered Medications:  albuterol 2 puff Inhalation Q6H PRN Carey Reddy MD    albuterol 2 5 mg Nebulization Q6H PRN Carey Reddy MD    allopurinol 300 mg Oral QAM Candelaria Reddy, MD    atorvastatin 10 mg Oral Daily Sonia Reddy MD    buPROPion 150 mg Oral Daily Sonia Reddy MD    carBAMazepine 100 mg Oral TID Sonia Reddy MD    clopidogrel 75 mg Oral Daily Santos Martinez MD    diltiazem 180 mg Oral Q12H [address] [facility],[address], MD    divalproex sodium 250 mg Oral Daily Santos Martinez MD    fenofibrate 48 mg Oral Daily Santos Martinez MD    fluticasone-vilanterol 1 puff Inhalation Daily Santos Martinez MD    heparin (porcine) 5,000 Units Subcutaneous Q8H [facility] Paola Singh MD    lactated ringers 50 mL/hr Intravenous Continuous Cynthia Jo CRNA    levothyroxine 88 mcg Oral Early Morning Sonia Reddy MD    metoprolol tartrate 50 mg Oral Q12H [address] [facility],[address], MD    ondansetron 4 mg Intravenous Q6H PRN Santos Martinez MD    senna-docusate sodium 1 tablet Oral Daily PRN Santos Martinez MD    sodium chloride 50 mL/hr Intravenous Continuous Santos Martinez MD Last Rate: 50 mL/hr (12/08/18 1531)   warfarin 3 mg Oral Daily (warfarin) Santos Martinez MD      Facility-Administered Medications Ordered in Other Encounters:  clopidogrel   Code/Trauma/Sedation Ovidio Mcdermott MD    diphenhydrAMINE   Code/Trauma/Sedation Ovidio Mcdermott MD    fentanyl citrate (PF)  Intravenous Code/Trauma/Sedation Ovidio Mcdermott MD    heparin (porcine)   Code/Trauma/Sedation Continuous Ovidio Mcdermott MD Last Rate: 12 Units/kg/hr (12/07/18 1332)   heparin (porcine)  Intra-arterial Code/Trauma/Sedation Ovidio Mcdermott MD    hydrALAZINE  Intravenous Code/Trauma/Sedation Ovidio Mcdermott MD    lidocaine   Code/Trauma/Sedation Ovidio Mcdermott MD    midazolam   Code/Trauma/Sedation Ovidio Mcdermott MD      Continuous Infusions:  lactated ringers 50 mL/hr    sodium chloride 50 mL/hr Last Rate: 50 mL/hr (12/08/18 1531)     PRN Meds:   albuterol    albuterol    ondansetron    senna-docusate sodium    Surgical procedures (if appropriate):  Procedure(s):  ARTERIOGRAM WITH STENT PLACEMENT

## 2018-12-09 NOTE — ASSESSMENT & PLAN NOTE
Status post right lower extremity angiogram/intervention  Transferred to [facility] for vascular intervention Anticoagulation recommended as per vascular surgery-restart Coumadin 3 mg q h s    Restart antiplatelet therapy with Plavix  Medically stable to discharge from vascular standpoint  Found to RLE pain limiting on movement- needing rehab   PT OT evaluation completed- recommended STR

## 2018-12-09 NOTE — PROGRESS NOTES
Progress Note - Vascular Surgery   Rian July 68 y o  female MRN: [id number]  Unit/Bed#: [facility] 529-01 Encounter: [id number]    Assessment:  68 F with PAD and misplaced R EIA and stent fracture, s/p bilateral kissing iliac stent placement    Plan:  Continue plavix, coumadin  Discharge per primary    Subjective/Objective     Subjective: No acute events  Objective:    Blood pressure 134/63, pulse 63, temperature 99 3 °F (37 4 °C), temperature source Oral, resp  rate 17, height 5' (1 524 m), weight 85 1 kg (187 lb 9 8 oz), SpO2 95 %  ,Body mass index is 36 64 kg/m²        Intake/Output Summary (Last 24 hours) at 12/09/18 0804  Last data filed at 12/09/18 0400   Gross per 24 hour   Intake          1049 17 ml   Output              800 ml   Net           249 17 ml       Invasive Devices     Peripheral Intravenous Line            Peripheral IV 12/07/18 Right Arm 1 day          Drain            Urethral Catheter Double-lumen 20 Fr  -- days    Urethral Catheter 92 days                Physical Exam:   General: NAD, AAOx3  CV: RRR +S1/S2  Chest: breath sounds bilaterally  Abdomen: soft, NT ND  Extremities: palp DP, left groin ecchymosis, right groin c/d/i        Results from last 7 days  Lab Units 12/08/18  0535 12/07/18  1640   WBC Thousand/uL 11 58* 12 96*   HEMOGLOBIN g/dL 10 0* 11 0*   HEMATOCRIT % 31 5* 34 0*   PLATELETS Thousands/uL 137* 137*       Results from last 7 days  Lab Units 12/08/18  0535 12/07/18  1639   POTASSIUM mmol/L 4 0 3 7   CHLORIDE mmol/L 110* 112*   CO2 mmol/L 27 24   BUN mg/dL 29* 32*   CREATININE mg/dL 1 09 1 10   CALCIUM mg/dL 9 3 8 2*       Results from last 7 days  Lab Units 12/08/18  0559 12/07/18  0659   INR  1 24* 1 17

## 2018-12-09 NOTE — PHYSICAL THERAPY NOTE
PT Treatment       12/09/18 1108   Pain Assessment   Pain Assessment Denton-Baker FACES   Denton-Baker FACES Pain Rating 8   Pain Type Acute pain   Pain Location Leg   Pain Orientation Right   Restrictions/Precautions   Weight Bearing Precautions Per Order No   Other Precautions Cognitive; Chair Alarm; Bed Alarm;Telemetry;O2;Fall Risk   General   Chart Reviewed Yes   Family/Caregiver Present No   Cognition   Overall Cognitive Status Impaired   Arousal/Participation Alert   Attention Attends with cues to redirect   Orientation Level Oriented to person;Oriented to place   Memory Decreased short term memory;Decreased recall of recent events;Decreased recall of precautions   Following Commands Follows one step commands with increased time or repetition   Bed Mobility   Additional Comments Pt seated in bedside chair on arrival   Transfers   Sit to Stand 3  Moderate assistance   Additional items Assist x 1; Increased time required;Verbal cues   Stand to Sit 4  Minimal assistance   Additional items Assist x 1; Increased time required   Balance   Static Sitting Fair +   Dynamic Sitting Fair   Static Standing Poor   Dynamic Standing Poor   Endurance Deficit   Endurance Deficit Yes   Endurance Deficit Description pain   Activity Tolerance   Activity Tolerance Patient limited by pain   Nurse Made Aware Yes, RN cleared pt for PT eval and made aware of pain   Exercises   Hamstring Sets Sitting;Left;10 reps  (manually resisted)   Knee AROM Long Arc Quad AROM;10 reps; Left;Sitting   Ankle Pumps 10 reps; Left;Sitting   Assessment   Prognosis Good   Problem List Decreased endurance; Impaired balance;Decreased mobility; Impaired judgement;Decreased cognition;Decreased safety awareness;Pain   Assessment Pt was agreeable to PT treatment session  Per RN, pt was max A x2 for stand pivot transfer from the bed to the chair in the AM   On arrival, pt was seated in bedside chair with alarm on    Room was set up for sit to stand transfer and attempted short distance ambulation  On standing, pt reported intense pain of the RLE and tolerated standing ~20 seconds before needing to sit  RLE was assessed and noted to be warm to the touch with swelling and tenderness with palpation of the calf  RN and MD were notified  Pt was unable to tolerate ambulation and transfers at this time  Therex for the LLE were performed seated  RLE therex was held until assessed by physician  At this time, pt is unable to tolerate ambulation and is requiring mod A for transfers  She is unsafe for d/c to personal care home and would benefit from STR in order to return to [facility]  Barriers to Discharge Inaccessible home environment   Barriers to Discharge Comments 10 ERON    Goals   Patient Goals Reports wanting the pain to go away and to go to rehab for some physical therapy   STG Expiration Date 12/18/18   Short Term Goal #1 1  Pt will perform all bed mobility at an independent level to facilitate OOB mobility 2  Pt will perform all functional transfers at a mod I level using RW vs rollator (per dtr pt typically refuses RW at home and will only use rollator) to facilitate increased safety upright mobility 3  Pt will ambulate 150 ft with RW vs rollator at a supervision level to allow for increased independence with mobility in personal care home 4  Pt will improve dynamic standing balance to Fair+/Good to decrease fall risk 5  Pt will negotiate 10 steps with supervision using LRAD and/or handrail PRN to allow pt to access her home environment safely   Treatment Day 1   Plan   Treatment/Interventions Functional transfer training;LE strengthening/ROM; Elevations; Therapeutic exercise; Endurance training;Patient/family training;Bed mobility; Equipment eval/education;Gait training;Spoke to nursing;Spoke to advanced practitioner   Progress Slow progress, decreased activity tolerance   PT Frequency (4-6x/wk)   Recommendation   Recommendation (Rehab)   Equipment Recommended Tonia Cutting   PT - OK to Discharge Yes   Additional Comments to rehab when medically stable     Jordon Rodriguez, PT, DPT

## 2018-12-09 NOTE — PROGRESS NOTES
Progress Note Rachel Meals [birth year], 68 y o  female MRN: [id number]    Unit/Bed#: [facility] 529-01 Encounter: [id number]    Primary Care Provider: Gerhardt Ralph, DO   Date and time admitted to hospital: 12/7/2018  3:03 PM        * Peripheral artery disease [facility])   Assessment & Plan    Status post right lower extremity angiogram/intervention  Transferred to [facility] for vascular intervention Anticoagulation recommended as per vascular surgery-restart Coumadin 3 mg q h s  However INR subtherapeutic, give coumadin 10mg once tonight  Adjust warfarin tomorrow  Serial INRs  Restart antiplatelet therapy with Plavix  Medically stable to discharge from vascular standpoint  Found to RLE pain limiting on movement- needing rehab   PT OT evaluation completed- recommended STR  Will follow up on venous and arterial dopplers of RLE since patient reports pain on ambulation     Stage 3 chronic kidney disease ([facility] )   Assessment & Plan    Creatinine 1 09  Renal function stable  Continues to be euvolemic, continue monitor     Ambulatory dysfunction   Assessment & Plan    Rehab to STR     Seizure disorder [facility])   Assessment & Plan    No seizure activity  Continue antiepileptic medications     Hyperlipidemia   Assessment & Plan    Continue statin     Atrial flutter ([facility] )   Assessment & Plan    Heart rate controlled continue monitor     Acute on chronic respiratory failure (HCC)   Assessment & Plan    Stable     HTN (hypertension)   Assessment & Plan    /66  Continue monitoring  Continue home meds         VTE Pharmacologic Prophylaxis:   Pharmacologic: Heparin  Mechanical VTE Prophylaxis in Place: No    Patient Centered Rounds: I have performed bedside rounds with nursing staff today  Discussions with Specialists or Other Care Team Provider: yes    Education and Discussions with Family / Patient: yes    Time Spent for Care: 45 minutes    More than 50% of total time spent on counseling and coordination of care as described above  Current Length of Stay: 2 day(s)    Current Patient Status: Inpatient   Certification Statement: The patient will continue to require additional inpatient hospital stay due to med mgt of RLE pain  awaiting rehab    Discharge Plan:STR rehab    Code Status: Level 1 - Full Code      Subjective:   Patient reported severe RLE pain limited her movement  ROS neg otherwise  Objective:     Vitals:   Temp (24hrs), Av 6 °F (37 °C), Min:98 1 °F (36 7 °C), Max:99 3 °F (37 4 °C)    Temp:  [98 1 °F (36 7 °C)-99 3 °F (37 4 °C)] 98 9 °F (37 2 °C)  HR:  [55-63] 55  Resp:  [17-23] 23  BP: (117-160)/(58-72) 141/66  SpO2:  [94 %-98 %] 96 %  Body mass index is 36 64 kg/m²  Input and Output Summary (last 24 hours): Intake/Output Summary (Last 24 hours) at 18 1337  Last data filed at 18 1121   Gross per 24 hour   Intake          1416 67 ml   Output              700 ml   Net           716 67 ml       Physical Exam:     Physical Exam   Constitutional: She is oriented to person, place, and time  No distress  HENT:   Head: Normocephalic and atraumatic  Mouth/Throat: Oropharynx is clear and moist    Eyes: Pupils are equal, round, and reactive to light  EOM are normal    Neck: Normal range of motion  Neck supple  No JVD present  Cardiovascular: Normal rate and normal heart sounds  Exam reveals no gallop and no friction rub  No murmur heard  Pulmonary/Chest: Effort normal and breath sounds normal  No respiratory distress  She has no wheezes  Abdominal: Soft  Bowel sounds are normal  She exhibits no distension  There is no tenderness  Musculoskeletal: Normal range of motion  She exhibits edema  RLE    Neurological: She is alert and oriented to person, place, and time  No cranial nerve deficit  Coordination normal    Skin: Skin is warm  No erythema  Psychiatric: She has a normal mood and affect           Additional Data:     Labs:      Results from last 7 days  Lab Units 12/08/18  0535   WBC Thousand/uL 11 58*   HEMOGLOBIN g/dL 10 0*   HEMATOCRIT % 31 5*   PLATELETS Thousands/uL 137*       Results from last 7 days  Lab Units 12/08/18  0535   SODIUM mmol/L 143   POTASSIUM mmol/L 4 0   CHLORIDE mmol/L 110*   CO2 mmol/L 27   BUN mg/dL 29*   CREATININE mg/dL 1 09   ANION GAP mmol/L 6   CALCIUM mg/dL 9 3   GLUCOSE RANDOM mg/dL 122       Results from last 7 days  Lab Units 12/08/18  0559   INR  1 24*       Results from last 7 days  Lab Units 12/08/18  2033   POC GLUCOSE mg/dl 120                   * I Have Reviewed All Lab Data Listed Above  * Additional Pertinent Lab Tests Reviewed:  Jerri [facility] Admission Reviewed    Imaging:    Imaging Reports Reviewed Today   Recent Cultures (last 7 days):           Last 24 Hours Medication List:     Current Facility-Administered Medications:  albuterol 2 puff Inhalation Q6H PRN Dina Reddy MD   albuterol 2 5 mg Nebulization Q6H PRN Dina Sanchez Doctor, MD   allopurinol 300 mg Oral Ravinicanor Nicolas Doctor, MD   atorvastatin 10 mg Oral Daily Dina Sanchez Doctor, MD   buPROPion 150 mg Oral Daily Dina Sanchez Doctor, MD   carBAMazepine 100 mg Oral TID Dina Reddy, MD   clopidogrel 75 mg Oral Daily Kd Veliz, MD   diltiazem 180 mg Oral Q12H [address] [facility],[address], MD   divalproex sodium 250 mg Oral Daily Kd Veliz MD   fenofibrate 48 mg Oral Daily Kd Veliz, MD   fluticasone-vilanterol 1 puff Inhalation Daily Kd Veliz MD   heparin (porcine) 5,000 Units Subcutaneous Q8H Earl Carrillo MD   lactated ringers 50 mL/hr Intravenous Continuous Angie MastGRIFFIN   levothyroxine 88 mcg Oral Early Morning Kd Veliz MD   metoprolol tartrate 50 mg Oral Q12H [address] [facility],[address], MD   ondansetron 4 mg Intravenous Q6H PRN Kd Veliz, MD   senna-docusate sodium 1 tablet Oral Daily PRN Kd Veliz MD   warfarin 10 mg Oral Once (warfarin) Kole Crowe MD     Facility-Administered Medications Ordered in Other Encounters:  clopidogrel   Code/Trauma/Sedation Bonifacio Landis MD    diphenhydrAMINE   Code/Trauma/Sedation Ovidio Davis MD    fentanyl citrate (PF)  Intravenous Code/Trauma/Sedation Ovidio Davis MD    heparin (porcine)   Code/Trauma/Sedation Continuous Ovidio Davis MD Last Rate: 12 Units/kg/hr (12/07/18 1332)   heparin (porcine)  Intra-arterial Code/Trauma/Sedation Ovidio Davis MD    hydrALAZINE  Intravenous Code/Trauma/Sedation Ovidio Davis MD    lidocaine   Code/Trauma/Sedation Ovidio Davis MD    midazolam   Code/Trauma/Sedation Ovidio Davis MD         Today, Patient Was Seen By: Shanika Jensen MD    ** Please Note: Dictation voice to text software may have been used in the creation of this document   **

## 2018-12-09 NOTE — PLAN OF CARE
CARDIOVASCULAR - ADULT     Maintains optimal cardiac output and hemodynamic stability Progressing        DISCHARGE PLANNING - CARE MANAGEMENT     Discharge to post-acute care or home with appropriate resources Progressing        GENITOURINARY - ADULT     Maintains or returns to baseline urinary function Progressing     Urinary catheter remains patent Progressing        METABOLIC, FLUID AND ELECTROLYTES - ADULT     Electrolytes maintained within normal limits Progressing        MUSCULOSKELETAL - ADULT     Maintain or return mobility to safest level of function Progressing     Maintain proper alignment of affected body part Progressing        Potential for Falls     Patient will remain free of falls Progressing        Prexisting or High Potential for Compromised Skin Integrity     Skin integrity is maintained or improved Progressing        RESPIRATORY - ADULT     Achieves optimal ventilation and oxygenation Progressing        SKIN/TISSUE INTEGRITY - ADULT     Skin integrity remains intact Progressing     Incision(s), wounds(s) or drain site(s) healing without S/S of infection Progressing

## 2018-12-10 PROBLEM — J96.10 CHRONIC RESPIRATORY FAILURE (HCC): Status: ACTIVE | Noted: 2018-03-22

## 2018-12-10 LAB
INR PPP: 1.4 (ref 0.86–1.17)
PROTHROMBIN TIME: 17.3 SECONDS (ref 11.8–14.2)

## 2018-12-10 PROCEDURE — 93925 LOWER EXTREMITY STUDY: CPT | Performed by: SURGERY

## 2018-12-10 PROCEDURE — 99232 SBSQ HOSP IP/OBS MODERATE 35: CPT | Performed by: INTERNAL MEDICINE

## 2018-12-10 PROCEDURE — 99231 SBSQ HOSP IP/OBS SF/LOW 25: CPT | Performed by: SURGERY

## 2018-12-10 PROCEDURE — 97530 THERAPEUTIC ACTIVITIES: CPT

## 2018-12-10 PROCEDURE — 93922 UPR/L XTREMITY ART 2 LEVELS: CPT | Performed by: SURGERY

## 2018-12-10 PROCEDURE — 97110 THERAPEUTIC EXERCISES: CPT

## 2018-12-10 PROCEDURE — 94760 N-INVAS EAR/PLS OXIMETRY 1: CPT

## 2018-12-10 PROCEDURE — 85610 PROTHROMBIN TIME: CPT | Performed by: HOSPITALIST

## 2018-12-10 RX ORDER — FUROSEMIDE 40 MG/1
40 TABLET ORAL DAILY
Status: DISCONTINUED | OUTPATIENT
Start: 2018-12-10 | End: 2018-12-13

## 2018-12-10 RX ORDER — WARFARIN SODIUM 5 MG/1
10 TABLET ORAL
Status: DISCONTINUED | OUTPATIENT
Start: 2018-12-10 | End: 2018-12-12

## 2018-12-10 RX ADMIN — FUROSEMIDE 40 MG: 40 TABLET ORAL at 17:01

## 2018-12-10 RX ADMIN — METOPROLOL TARTRATE 50 MG: 50 TABLET, FILM COATED ORAL at 20:42

## 2018-12-10 RX ADMIN — CARBAMAZEPINE 100 MG: 100 TABLET, CHEWABLE ORAL at 16:16

## 2018-12-10 RX ADMIN — DILTIAZEM HYDROCHLORIDE 180 MG: 90 CAPSULE, EXTENDED RELEASE ORAL at 20:42

## 2018-12-10 RX ADMIN — BUPROPION HYDROCHLORIDE 150 MG: 150 TABLET, EXTENDED RELEASE ORAL at 08:46

## 2018-12-10 RX ADMIN — FLUTICASONE FUROATE AND VILANTEROL TRIFENATATE 1 PUFF: 100; 25 POWDER RESPIRATORY (INHALATION) at 08:48

## 2018-12-10 RX ADMIN — WARFARIN SODIUM 10 MG: 5 TABLET ORAL at 17:01

## 2018-12-10 RX ADMIN — ATORVASTATIN CALCIUM 10 MG: 10 TABLET, FILM COATED ORAL at 08:41

## 2018-12-10 RX ADMIN — LEVOTHYROXINE SODIUM 88 MCG: 88 TABLET ORAL at 05:22

## 2018-12-10 RX ADMIN — METOPROLOL TARTRATE 50 MG: 50 TABLET, FILM COATED ORAL at 08:41

## 2018-12-10 RX ADMIN — DILTIAZEM HYDROCHLORIDE 180 MG: 90 CAPSULE, EXTENDED RELEASE ORAL at 11:38

## 2018-12-10 RX ADMIN — CLOPIDOGREL BISULFATE 75 MG: 75 TABLET ORAL at 08:41

## 2018-12-10 RX ADMIN — CARBAMAZEPINE 100 MG: 100 TABLET, CHEWABLE ORAL at 08:46

## 2018-12-10 RX ADMIN — FENOFIBRATE 48 MG: 48 TABLET ORAL at 08:41

## 2018-12-10 RX ADMIN — DIVALPROEX SODIUM 250 MG: 250 TABLET, EXTENDED RELEASE ORAL at 08:41

## 2018-12-10 RX ADMIN — ALLOPURINOL 300 MG: 300 TABLET ORAL at 08:41

## 2018-12-10 RX ADMIN — CARBAMAZEPINE 100 MG: 100 TABLET, CHEWABLE ORAL at 20:42

## 2018-12-10 NOTE — ASSESSMENT & PLAN NOTE
Not in exacerbation  Continue with current dose of Breo Ellipta, albuterol  Has oxygen at home which she uses intermittently

## 2018-12-10 NOTE — PROGRESS NOTES
Vascular Surgery    Progress Note [place] 0[birth year], 68 y o  female MRN: [id number]    Unit/Bed#: [facility] 529-01 Encounter: [id number]    Primary Care Provider: Rosalino Giron DO   Date and time admitted to hospital: 12/7/2018  3:03 PM    * Peripheral artery disease ([address] )   Assessment & Plan    Pt is a 67 yo F w/ dementia, aflutter (on coumadin), COPD, CKD, szs, HTN, HLD, PAD s/p LLE endovascular intervention, underwent RLE agram/intervention with Dr Rebecca Park c/b misplaced R EIA stent deployment and aortoiliac dissection, transferred to [facility]     --S/P Angiogram showing initial stent fracture, placement of B TEMO kissing Viabahn stents, R EIA distal stent extension for dissection 12/7    Plan:  --Continue Plavix, Coumadin (Afib/Flutter), and Statin  --PT/OT  --Case management for dispo planning; ok for discharge from vascular perspective       Subjective:  Pt doing well    Vitals:  /72   Pulse 56   Temp 98 °F (36 7 °C) (Oral)   Resp 16   Ht 5' (1 524 m)   Wt 85 2 kg (187 lb 13 3 oz)   SpO2 96%   BMI 36 68 kg/m²     I/Os:  I/O last 3 completed shifts: In: 1962 5 [I V :1962 5]  Out: 900 [Urine:900]  I/O this shift:  In: -   Out: 350 [Urine:350]    Lab Results and Cultures:   Lab Results   Component Value Date    WBC 11 58 (H) 12/08/2018    HGB 10 0 (L) 12/08/2018    HCT 31 5 (L) 12/08/2018     (H) 12/08/2018     (L) 12/08/2018     Lab Results   Component Value Date    CALCIUM 9 3 12/08/2018    K 4 0 12/08/2018    CO2 27 12/08/2018     (H) 12/08/2018    BUN 29 (H) 12/08/2018    CREATININE 1 09 12/08/2018     Lab Results   Component Value Date    INR 1 24 (H) 12/08/2018    INR 1 17 12/07/2018    INR 1 19 (H) 09/09/2018    PROTIME 15 7 (H) 12/08/2018    PROTIME 14 8 (H) 12/07/2018    PROTIME 15 0 (H) 09/09/2018        Blood Culture:   Lab Results   Component Value Date    BLOODCX No Growth After 5 Days   12/10/2017   ,   Urinalysis:   Lab Results   Component Value Date    COLORU Yellow 05/07/2018    CLARITYU Clear 05/07/2018    SPECGRAV 1 010 05/07/2018    PHUR 7 0 05/07/2018    LEUKOCYTESUR Trace (A) 05/07/2018    NITRITE Positive (A) 05/07/2018    GLUCOSEU Negative 05/07/2018    KETONESU Negative 05/07/2018    BILIRUBINUR Negative 05/07/2018    BLOODU Small (A) 05/07/2018   ,   Urine Culture:   Lab Results   Component Value Date    URINECX >100,000 cfu/ml Enterococcus faecalis (A) 03/22/2018    URINECX 40,000-49,000 cfu/ml Stenotrophomonas maltophilia (A) 03/22/2018   ,   Wound Culure: No results found for: WOUNDCULT    Medications:  Current Facility-Administered Medications   Medication Dose Route Frequency    albuterol (PROVENTIL HFA,VENTOLIN HFA) inhaler 2 puff  2 puff Inhalation Q6H PRN    albuterol inhalation solution 2 5 mg  2 5 mg Nebulization Q6H PRN    allopurinol (ZYLOPRIM) tablet 300 mg  300 mg Oral QAM    atorvastatin (LIPITOR) tablet 10 mg  10 mg Oral Daily    buPROPion (WELLBUTRIN SR) 12 hr tablet 150 mg  150 mg Oral Daily    carBAMazepine (TEGretol) chewable tablet 100 mg  100 mg Oral TID    clopidogrel (PLAVIX) tablet 75 mg  75 mg Oral Daily    diltiazem (CARDIZEM SR) 12 hr capsule 180 mg  180 mg Oral Q12H TONI    divalproex sodium (DEPAKOTE ER) 24 hr tablet 250 mg  250 mg Oral Daily    fenofibrate (TRICOR) tablet 48 mg  48 mg Oral Daily    fluticasone-vilanterol (BREO ELLIPTA) 100-25 mcg/inh inhaler 1 puff  1 puff Inhalation Daily    levothyroxine tablet 88 mcg  88 mcg Oral Early Morning    metoprolol tartrate (LOPRESSOR) tablet 50 mg  50 mg Oral Q12H [facility]    ondansetron (ZOFRAN) injection 4 mg  4 mg Intravenous Q6H PRN    senna-docusate sodium (SENOKOT S) 8 6-50 mg per tablet 1 tablet  1 tablet Oral Daily PRN    warfarin (COUMADIN) tablet 10 mg  10 mg Oral Once (warfarin)     Facility-Administered Medications Ordered in Other Encounters   Medication Dose Route Frequency    clopidogrel (PLAVIX) tablet    Code/Trauma/Sedation Med    diphenhydrAMINE (BENADRYL) injection    Code/Trauma/Sedation Med    fentanyl citrate (PF) 100 MCG/2ML   Intravenous Code/Trauma/Sedation Med    heparin (porcine) 25,000 units in 250 mL infusion (premix)    Code/Trauma/Sedation Continuous Med    heparin (porcine) injection   Intra-arterial Code/Trauma/Sedation Med    hydrALAZINE (APRESOLINE) injection   Intravenous Code/Trauma/Sedation Med    lidocaine (XYLOCAINE) 1 % injection    Code/Trauma/Sedation Med    midazolam (VERSED) injection    Code/Trauma/Sedation Med       Physical Exam:    General appearance: alert and oriented, in no acute distress  Lungs: clear to auscultation bilaterally  Heart: regular rate and rhythm, S1, S2 normal, no murmur, click, rub or gallop  Abdomen: soft, non-tender; bowel sounds normal; no masses,  no organomegaly  Extremities: extremities normal, warm and well-perfused; no cyanosis, clubbing, or edema    Wound/Incision:  Bilateral groin punctures CDI, +ecchymosis, no hematoma    Pulse exam:  DP: Right: 2+ Left: 2+        Latasha Duque PA-C  12/10/2018

## 2018-12-10 NOTE — PLAN OF CARE
Problem: PHYSICAL THERAPY ADULT  Goal: Performs mobility at highest level of function for planned discharge setting  See evaluation for individualized goals  Treatment/Interventions: Functional transfer training, LE strengthening/ROM, Elevations, Therapeutic exercise, Endurance training, Cognitive reorientation, Patient/family training, Bed mobility, Equipment eval/education, Gait training, Spoke to nursing  Equipment Recommended:  (use of RW at this time)       See flowsheet documentation for full assessment, interventions and recommendations  Outcome: Progressing  Prognosis: Fair  Problem List: Decreased endurance, Impaired balance, Decreased mobility, Impaired judgement, Decreased cognition, Decreased safety awareness, Pain  Assessment: Pt tolerated treatment fairly and is progressing slowly compared to last session  Continues to be limited by pain in RLE, however dopplers were negative for DVT  Able to perform multiple sit to stand trials with decreased level of assist required  Difficulty advancing LLE due to inability to bear full weight on RLE  Able to perform standing marching trials as well as 2` forward/backward of ambulation with RW  Tolerates seated LE exercises well with verbal cues for technique  Will continue to benefit from ongoing skilled PT to maximize her functional mobility and increase her level of independence  Recommending rehab at time of discharge when medically appropriate  Barriers to Discharge: Inaccessible home environment  Barriers to Discharge Comments: 10 ERON   Recommendation: Post acute IP rehab     PT - OK to Discharge: Yes    See flowsheet documentation for full assessment

## 2018-12-10 NOTE — PROGRESS NOTES
[facility]50[facility][birth year], 68 y o  female MRN: [id number]    Unit/Bed#: [facility] 529-01 Encounter: [id number]    Primary Care Provider: Clemente Cheng DO   Date and time admitted to hospital: 12/7/2018  3:03 PM        * Peripheral artery disease [facility])   Assessment & Plan    Status post now s/p angiogram showing initial stent fracture, placement of B TEMO stents, R EIA distal stent extension for dissection     Repeat arterial Dopplers were obtained given right lower extremity edema which shows improvement in bilateral ANABEL  Venous duplex: 12/9  neg for DVT  ANABEL 12/9: ANABEL: R0 77, L 0 99  PLAN:  Continue with Plavix and statin  Continue anticoagulation with Coumadin  Appreciate vascular surgery input  Outpatient vascular surgery follow-up  Stage 3 chronic kidney disease ([address] )   Assessment & Plan    Creatinine 1 09  Renal function stable  Restart home dose lasix  Ambulatory dysfunction   Assessment & Plan    Physical therapy recommends rehab   is aware  Seizure disorder [facility])   Assessment & Plan    Continue with home dose carbamazepine and Depakote  Hyperlipidemia   Assessment & Plan    Continue statin     Atrial flutter (HCC)   Assessment & Plan    Rate control on current dose of Cardizem and metoprolol  On Coumadin for anticoagulation with subtherapeutic INR  Will give 10 mg  Coumadin tonight, check INR tomorrow  Chronic respiratory failure (HCC)   Assessment & Plan    Stable  O2 supplementation as needed to maintain O2 sats between 88-92 %  Incentive spirometry     COPD (chronic obstructive pulmonary disease) ([facility])   Assessment & Plan    Not in exacerbation  Continue with current dose of Breo Ellipta, albuterol  Has oxygen at home which she uses intermittently  HTN (hypertension)   Assessment & Plan    Continue with 180 mg daily Cardizem and 50 mg b i d  Lopressor  Restart 40 mg Lasix which she is on at home           VTE Pharmacologic Prophylaxis: Pharmacologic: Warfarin (Coumadin)  Mechanical VTE Prophylaxis in Place: Yes    Patient Centered Rounds: I have performed bedside rounds with nursing staff today  Discussions with Specialists or Other Care Team Provider: SHABANA    Education and Discussions with Family / Patient: Discussed plan of care with patient and daughter Maribel Marie at bedside]    Time Spent for Care: 30 minutes  More than 50% of total time spent on counseling and coordination of care as described above  Current Length of Stay: 3 day(s)    Current Patient Status: Inpatient   Certification Statement: The patient will continue to require additional inpatient hospital stay due to Pending placement    Discharge Plan:  Discharge to rehab pending placement    Code Status: Level 1 - Full Code      Subjective:   Patient denies any acute discomfort  Looks comfortable and in no overnight events reported  Objective:     Vitals:   Temp (24hrs), Av 3 °F (36 8 °C), Min:98 °F (36 7 °C), Max:98 9 °F (37 2 °C)    Temp:  [98 °F (36 7 °C)-98 9 °F (37 2 °C)] 98 1 °F (36 7 °C)  HR:  [55-65] 59  Resp:  [16-22] 18  BP: (112-180)/(58-84) 112/58  SpO2:  [87 %-100 %] 96 %  Body mass index is 36 68 kg/m²  Input and Output Summary (last 24 hours): Intake/Output Summary (Last 24 hours) at 12/10/18 1641  Last data filed at 12/10/18 1354   Gross per 24 hour   Intake              480 ml   Output             1000 ml   Net             -520 ml       Physical Exam:     Physical Exam   Constitutional: She is oriented to person, place, and time  No distress  Eyes: Pupils are equal, round, and reactive to light  Cardiovascular: Normal rate, regular rhythm, normal heart sounds and intact distal pulses  No murmur heard  Pulmonary/Chest: Breath sounds normal  No respiratory distress  She has no wheezes  She has no rales  Abdominal: Soft  Bowel sounds are normal  She exhibits no distension  There is no tenderness  Musculoskeletal: She exhibits edema  Neurological: She is alert and oriented to person, place, and time  No focal motor deficits   Skin: Skin is warm  Additional Data:     Labs:      Results from last 7 days  Lab Units 12/08/18  0535   WBC Thousand/uL 11 58*   HEMOGLOBIN g/dL 10 0*   HEMATOCRIT % 31 5*   PLATELETS Thousands/uL 137*       Results from last 7 days  Lab Units 12/08/18  0535   SODIUM mmol/L 143   POTASSIUM mmol/L 4 0   CHLORIDE mmol/L 110*   CO2 mmol/L 27   BUN mg/dL 29*   CREATININE mg/dL 1 09   ANION GAP mmol/L 6   CALCIUM mg/dL 9 3   GLUCOSE RANDOM mg/dL 122       Results from last 7 days  Lab Units 12/10/18  0853   INR  1 40*       Results from last 7 days  Lab Units 12/08/18  2033   POC GLUCOSE mg/dl 120                   * I Have Reviewed All Lab Data Listed Above  * Additional Pertinent Lab Tests Reviewed: All Labs Within Last 24 Hours Reviewed    Imaging:    VAS lower limb arterial duplex, complete bilateral   Final Result by Clyde Reddy MD (12/10 1873)      VAS lower limb venous duplex study, complete bilateral   Final Result by Clyde Reddy MD (12/09 1814)      IR abdominal angiography / intervention   Final Result by Aaron Gomez MD (12/08 2006)   Impression:    Successful treatment of fractured right common and external iliac stent with coexisting distal abdominal aortic dissection, bilateral common iliac artery dissections, and right external iliac artery dissection with placement of " kissing " common iliac    Viabahn stent grafts extending into the lower abdominal aorta with successful exclusion of the fractured stent from the circulation as described,  and significantly less contrast filling the aortic and common iliac dissections  The right external iliac    stent was extended down to the inguinal ligament to treat the external iliac dissection  A good result was obtained, as described above        Workstation performed: [id number]             Recent Cultures (last 7 days):           Last 24 Hours Medication List:     Current Facility-Administered Medications:  albuterol 2 puff Inhalation Q6H PRN Lori Reddy MD   albuterol 2 5 mg Nebulization Q6H PRN Ebony Yi MD   allopurinol 300 mg Oral Ashok Nyhan Doctor, MD   atorvastatin 10 mg Oral Daily Ebony Yi MD   buPROPion 150 mg Oral Daily Ebony Yi MD   carBAMazepine 100 mg Oral TID Lori Reddy MD   clopidogrel 75 mg Oral Daily Ebony Yi MD   diltiazem 180 mg Oral Q12H [address] [facility] [address], [facility]   divalproex sodium 250 mg Oral Daily Ebony Yi MD   fenofibrate 48 mg Oral Daily Ebony Yi MD   fluticasone-vilanterol 1 puff Inhalation Daily Ebony Yi MD   furosemide 40 mg Oral Daily Elam Fleischer, MD   levothyroxine 88 mcg Oral Early Morning Lori Reddy MD   metoprolol tartrate 50 mg Oral Q12H [address] [facility] [address], MD   ondansetron 4 mg Intravenous Q6H PRN Ebony Yi MD   senna-docusate sodium 1 tablet Oral Daily PRN Ebony Yi MD   warfarin 10 mg Oral Daily (warfarin) Elam Fleischer, MD     Facility-Administered Medications Ordered in Other Encounters:  clopidogrel   Code/Trauma/Sedation Med Talmage Schlatter, MD    diphenhydrAMINE   Code/Trauma/Sedation Med Talmage Schlatter, MD    fentanyl citrate (PF)  Intravenous Code/Trauma/Sedation Med Talmage Schlatter, MD    heparin (porcine)   Code/Trauma/Sedation Continuous Med Talmage Schlatter, MD Last Rate: 12 Units/kg/hr (12/07/18 1332)   heparin (porcine)  Intra-arterial Code/Trauma/Sedation Med Talmage Schlatter, MD    hydrALAZINE  Intravenous Code/Trauma/Sedation Med Talmage Schlatter, MD    lidocaine   Code/Trauma/Sedation Med Talmage Schlatter, MD    midazolam   Code/Trauma/Sedation Med Talmage Schlatter, MD         Today, Patient Was Seen By: Elam Fleischer, MD    ** Please Note: Dictation voice to text software may have been used in the creation of this document   **

## 2018-12-10 NOTE — PHYSICAL THERAPY NOTE
PHYSICAL THERAPY TREATMENT NOTE    Patient Name: Linda Eason  [id number] Date: 12/10/2018     12/10/18 7980   Pain Assessment   Pain Assessment 0-10   Pain Score 9   Pain Type Acute pain   Pain Location Leg   Pain Orientation Lower;Right   Hospital Pain Intervention(s) Ambulation/increased activity;Repositioned   Response to Interventions tolerated   Restrictions/Precautions   Weight Bearing Precautions Per Order No   Other Precautions Cognitive; Chair Alarm; Bed Alarm; Impulsive;O2;Telemetry;Multiple lines; Fall Risk;Pain   General   Chart Reviewed Yes   Response to Previous Treatment Patient with no complaints from previous session  Family/Caregiver Present No   Cognition   Overall Cognitive Status Impaired   Arousal/Participation Alert   Attention Attends with cues to redirect   Subjective   Subjective Pt agrees to PT treatment  Bed Mobility   Supine to Sit Unable to assess  (OOB in chair pre/post session with alarm intact)   Transfers   Sit to Stand 4  Minimal assistance  (initially mod A, progressed to min A )   Additional items Assist x 1; Increased time required;Verbal cues   Stand to Sit 4  Minimal assistance   Additional items Assist x 1; Increased time required;Verbal cues   Stand pivot 3  Moderate assistance   Additional items Assist x 1; Increased time required;Verbal cues  (with RW)   Ambulation/Elevation   Gait pattern Improper Weight shift; Antalgic;Decreased R stance;Decreased foot clearance;Shuffling; Short stride; Step to;Excessively slow   Gait Assistance 3  Moderate assist   Additional items Assist x 1;Verbal cues   Assistive Device Rolling walker   Distance 2` forward/backward   Stair Management Assistance Not tested   Balance   Static Sitting Fair +   Static Standing Poor +   Dynamic Standing Poor   Ambulatory Poor   Endurance Deficit   Endurance Deficit Yes   Endurance Deficit Description limited ambulation distance/standing tolerance   Activity Tolerance   Activity Tolerance Patient limited by pain   Nurse Made Aware Per RN, pt appropriate to treat   Exercises   Hip Abduction Sitting;20 reps;AROM; Bilateral  (x2)   Hip Adduction Sitting;20 reps;AROM; Bilateral  (x2)   Knee AROM Long Arc Quad Sitting;20 reps;AROM; Bilateral  (x2)   Ankle Pumps Sitting;20 reps;AROM; Bilateral  (x2)   Marching Sitting;20 reps;AROM; Bilateral;Standing  (seated and standing trials x2)   Assessment   Prognosis Fair   Problem List Decreased endurance; Impaired balance;Decreased mobility; Impaired judgement;Decreased cognition;Decreased safety awareness;Pain   Assessment Pt tolerated treatment fairly and is progressing slowly compared to last session  Continues to be limited by pain in RLE, however dopplers were negative for DVT  Able to perform multiple sit to stand trials with decreased level of assist required  Difficulty advancing LLE due to inability to bear full weight on RLE  Able to perform standing marching trials as well as 2` forward/backward of ambulation with RW  Tolerates seated LE exercises well with verbal cues for technique  Will continue to benefit from ongoing skilled PT to maximize her functional mobility and increase her level of independence  Recommending rehab at time of discharge when medically appropriate  Goals   Patient Goals Pt would like to have less pain  STG Expiration Date 12/18/18   Treatment Day 2   Plan   Treatment/Interventions Functional transfer training;LE strengthening/ROM; Therapeutic exercise; Endurance training;Patient/family training;Equipment eval/education; Bed mobility;Gait training   Progress Slow progress, decreased activity tolerance   PT Frequency (4-6x/week)   Recommendation   Recommendation Post acute IP rehab   Equipment Recommended Azalea Hawkins, PT,DPT

## 2018-12-10 NOTE — SOCIAL WORK
Met with patient's daughter to discuss plans for discharge  Explained that snf choices are reviewing the patient  Daughter reports to [facility] that patient has inpatient [address] admission to [facility] in 2016 or 2017 to manage meds due to dementia behaviors  Explained that patient would have target diagnosis due to this and will require OPTIONs assessment  Daughter expressed understanding and signed MA 46 and PASRR for Level 2 evaluation  Call received from Sarah Pelaez, admissions with [facility] who reports their insurance check shows patient has managed care policy  CM will verify with daughter and patient accounts  Informed Sarah Pelaez of need for OPTIONs assessment

## 2018-12-10 NOTE — ASSESSMENT & PLAN NOTE
Rate control on current dose of Cardizem and metoprolol  On Coumadin for anticoagulation with subtherapeutic INR  Will give 10 mg  Coumadin tonight, check INR tomorrow

## 2018-12-10 NOTE — RESTORATIVE TECHNICIAN NOTE
Restorative Specialist Mobility Note       Activity: Chair, Dangle, Stand at bedside, Turn, Ambulate in room, Ambulate in joshua     Assistive Device: Front wheel walker     Ambulation Response:  Tolerated fairly well  Repositioned: Sitting, Up in chair, Other (Comment) (chair alarm on)

## 2018-12-10 NOTE — PLAN OF CARE
Problem: Potential for Falls  Goal: Patient will remain free of falls  INTERVENTIONS:  - Assess patient frequently for physical needs  -  Identify cognitive and physical deficits and behaviors that affect risk of falls    -  [place] fall precautions as indicated by assessment   - Educate patient/family on patient safety including physical limitations  - Instruct patient to call for assistance with activity based on assessment  - Modify environment to reduce risk of injury  - Consider OT/PT consult to assist with strengthening/mobility   Outcome: Progressing      Problem: Prexisting or High Potential for Compromised Skin Integrity  Goal: Skin integrity is maintained or improved  INTERVENTIONS:  - Identify patients at risk for skin breakdown  - Assess and monitor skin integrity  - Assess and monitor nutrition and hydration status  - Monitor labs (i e  albumin)  - Assess for incontinence   - Turn and reposition patient  - Assist with mobility/ambulation  - Relieve pressure over bony prominences  - Avoid friction and shearing  - Provide appropriate hygiene as needed including keeping skin clean and dry  - Evaluate need for skin moisturizer/barrier cream  - Collaborate with interdisciplinary team (i e  Nutrition, Rehabilitation, etc )   - Patient/family teaching   Outcome: Progressing      Problem: DISCHARGE PLANNING - CARE MANAGEMENT  Goal: Discharge to post-acute care or home with appropriate resources  INTERVENTIONS:  - Conduct assessment to determine patient/family and health care team treatment goals, and need for post-acute services based on payer coverage, community resources, and patient preferences, and barriers to discharge  - Address psychosocial, clinical, and financial barriers to discharge as identified in assessment in conjunction with the patient/family and health care team  - Arrange appropriate level of post-acute services according to patients   needs and preference and payer coverage in collaboration with the physician and health care team  - Communicate with and update the patient/family, physician, and health care team regarding progress on the discharge plan  - Arrange appropriate transportation to post-acute venues   Outcome: Progressing      Problem: CARDIOVASCULAR - ADULT  Goal: Maintains optimal cardiac output and hemodynamic stability  INTERVENTIONS:  - Monitor I/O, vital signs and rhythm  - Monitor for S/S and trends of decreased cardiac output i e  bleeding, hypotension  - Administer and titrate ordered vasoactive medications to optimize hemodynamic stability  - Assess quality of pulses, skin color and temperature  - Assess for signs of decreased coronary artery perfusion - ex   Angina  - Instruct patient to report change in severity of symptoms   Outcome: Progressing      Problem: RESPIRATORY - ADULT  Goal: Achieves optimal ventilation and oxygenation  INTERVENTIONS:  - Assess for changes in respiratory status  - Assess for changes in mentation and behavior  - Position to facilitate oxygenation and minimize respiratory effort  - Oxygen administration by appropriate delivery method based on oxygen saturation (per order) or ABGs  - Initiate smoking cessation education as indicated  - Encourage broncho-pulmonary hygiene including cough, deep breathe, Incentive Spirometry  - Assess the need for suctioning and aspirate as needed  - Assess and instruct to report SOB or any respiratory difficulty  - Respiratory Therapy support as indicated   Outcome: Progressing      Problem: GENITOURINARY - ADULT  Goal: Maintains or returns to baseline urinary function  INTERVENTIONS:  - Assess urinary function  - Encourage oral fluids to ensure adequate hydration  - Administer IV fluids as ordered to ensure adequate hydration  - Administer ordered medications as needed  - Offer frequent toileting  - Follow urinary retention protocol if ordered   Outcome: Progressing    Goal: Urinary catheter remains patent  INTERVENTIONS:  - Assess patency of urinary catheter  - If patient has a chronic baca, consider changing catheter if non-functioning  - Follow guidelines for intermittent irrigation of non-functioning urinary catheter   Outcome: Progressing      Problem: METABOLIC, FLUID AND ELECTROLYTES - ADULT  Goal: Electrolytes maintained within normal limits  INTERVENTIONS:  - Monitor labs and assess patient for signs and symptoms of electrolyte imbalances  - Administer electrolyte replacement as ordered  - Monitor response to electrolyte replacements, including repeat lab results as appropriate  - Instruct patient on fluid and nutrition as appropriate   Outcome: Progressing      Problem: SKIN/TISSUE INTEGRITY - ADULT  Goal: Skin integrity remains intact  INTERVENTIONS  - Identify patients at risk for skin breakdown  - Assess and monitor skin integrity  - Assess and monitor nutrition and hydration status  - Monitor labs (i e  albumin)  - Assess for incontinence   - Turn and reposition patient  - Assist with mobility/ambulation  - Relieve pressure over bony prominences  - Avoid friction and shearing  - Provide appropriate hygiene as needed including keeping skin clean and dry  - Evaluate need for skin moisturizer/barrier cream  - Collaborate with interdisciplinary team (i e  Nutrition, Rehabilitation, etc )   - Patient/family teaching   Outcome: Progressing    Goal: Incision(s), wounds(s) or drain site(s) healing without S/S of infection  INTERVENTIONS  - Assess and document risk factors for skin impairment   - Assess and document dressing, incision, wound bed, drain sites and surrounding tissue  - Initiate Nutrition services consult and/or wound management as needed   Outcome: Progressing      Problem: MUSCULOSKELETAL - ADULT  Goal: Maintain or return mobility to safest level of function  INTERVENTIONS:  - Assess patient's ability to carry out ADLs; assess patient's baseline for ADL function and identify physical deficits which impact ability to perform ADLs (bathing, care of mouth/teeth, toileting, grooming, dressing, etc )  - Assess/evaluate cause of self-care deficits   - Assess range of motion  - Assess patient's mobility; develop plan if impaired  - Assess patient's need for assistive devices and provide as appropriate  - Encourage maximum independence but intervene and supervise when necessary  - Involve family in performance of ADLs  - Assess for home care needs following discharge   - Request OT consult to assist with ADL evaluation and planning for discharge  - Provide patient education as appropriate   Outcome: Progressing    Goal: Maintain proper alignment of affected body part  INTERVENTIONS:  - Support, maintain and protect limb and body alignment  - Provide pt/fam with appropriate education   Outcome: Progressing

## 2018-12-10 NOTE — ASSESSMENT & PLAN NOTE
Pt is a 69 yo F w/ dementia, aflutter (on coumadin), COPD, CKD, szs, HTN, HLD, PAD s/p LLE endovascular intervention, underwent RLE agram/intervention with Dr Vickie Gonzalez c/b misplaced R EIA stent deployment and aortoiliac dissection, transferred to [facility]     --S/P Angiogram showing initial stent fracture, placement of B TEMO kissing Viabahn stents, R EIA distal stent extension for dissection 12/7    Plan:  --Continue Plavix, Coumadin (Afib/Flutter), and Statin  --PT/OT  --Case management for dispo planning; ok for discharge from vascular perspective

## 2018-12-10 NOTE — ASSESSMENT & PLAN NOTE
Status post now s/p angiogram showing initial stent fracture, placement of B TEMO stents, R EIA distal stent extension for dissection     Repeat arterial Dopplers were obtained given right lower extremity edema which shows improvement in bilateral ANABEL  Venous duplex: 12/9  neg for DVT  ANABEL 12/9: ANABEL: R0 77, L 0 99  PLAN:  Continue with Plavix and statin  Continue anticoagulation with Coumadin  Appreciate vascular surgery input  Outpatient vascular surgery follow-up

## 2018-12-10 NOTE — ASSESSMENT & PLAN NOTE
Continue with 180 mg daily Cardizem and 50 mg b i d  Lopressor  Restart 40 mg Lasix which she is on at home

## 2018-12-11 PROBLEM — D53.9 MACROCYTIC ANEMIA: Status: ACTIVE | Noted: 2018-12-11

## 2018-12-11 LAB
ANION GAP SERPL CALCULATED.3IONS-SCNC: 8 MMOL/L (ref 4–13)
BASOPHILS # BLD AUTO: 0.02 THOUSANDS/ΜL (ref 0–0.1)
BASOPHILS NFR BLD AUTO: 0 % (ref 0–1)
BUN SERPL-MCNC: 21 MG/DL (ref 5–25)
CALCIUM SERPL-MCNC: 8.9 MG/DL (ref 8.3–10.1)
CHLORIDE SERPL-SCNC: 106 MMOL/L (ref 100–108)
CO2 SERPL-SCNC: 30 MMOL/L (ref 21–32)
CREAT SERPL-MCNC: 1.18 MG/DL (ref 0.6–1.3)
EOSINOPHIL # BLD AUTO: 0.22 THOUSAND/ΜL (ref 0–0.61)
EOSINOPHIL NFR BLD AUTO: 4 % (ref 0–6)
ERYTHROCYTE [DISTWIDTH] IN BLOOD BY AUTOMATED COUNT: 13 % (ref 11.6–15.1)
ERYTHROCYTE [DISTWIDTH] IN BLOOD BY AUTOMATED COUNT: 13.2 % (ref 11.6–15.1)
GFR SERPL CREATININE-BSD FRML MDRD: 46 ML/MIN/1.73SQ M
GLUCOSE SERPL-MCNC: 104 MG/DL (ref 65–140)
HCT VFR BLD AUTO: 27.5 % (ref 34.8–46.1)
HCT VFR BLD AUTO: 28.5 % (ref 34.8–46.1)
HGB BLD-MCNC: 8.7 G/DL (ref 11.5–15.4)
HGB BLD-MCNC: 9.3 G/DL (ref 11.5–15.4)
IMM GRANULOCYTES # BLD AUTO: 0.06 THOUSAND/UL (ref 0–0.2)
IMM GRANULOCYTES NFR BLD AUTO: 1 % (ref 0–2)
INR PPP: 1.48 (ref 0.86–1.17)
LYMPHOCYTES # BLD AUTO: 1.7 THOUSANDS/ΜL (ref 0.6–4.47)
LYMPHOCYTES NFR BLD AUTO: 28 % (ref 14–44)
MCH RBC QN AUTO: 33 PG (ref 26.8–34.3)
MCH RBC QN AUTO: 34.1 PG (ref 26.8–34.3)
MCHC RBC AUTO-ENTMCNC: 31.6 G/DL (ref 31.4–37.4)
MCHC RBC AUTO-ENTMCNC: 32.6 G/DL (ref 31.4–37.4)
MCV RBC AUTO: 104 FL (ref 82–98)
MCV RBC AUTO: 104 FL (ref 82–98)
MONOCYTES # BLD AUTO: 0.77 THOUSAND/ΜL (ref 0.17–1.22)
MONOCYTES NFR BLD AUTO: 13 % (ref 4–12)
NEUTROPHILS # BLD AUTO: 3.22 THOUSANDS/ΜL (ref 1.85–7.62)
NEUTS SEG NFR BLD AUTO: 54 % (ref 43–75)
NRBC BLD AUTO-RTO: 0 /100 WBCS
PLATELET # BLD AUTO: 112 THOUSANDS/UL (ref 149–390)
PLATELET # BLD AUTO: 124 THOUSANDS/UL (ref 149–390)
PMV BLD AUTO: 12.3 FL (ref 8.9–12.7)
PMV BLD AUTO: 12.8 FL (ref 8.9–12.7)
POTASSIUM SERPL-SCNC: 3.5 MMOL/L (ref 3.5–5.3)
PROTHROMBIN TIME: 18 SECONDS (ref 11.8–14.2)
RBC # BLD AUTO: 2.64 MILLION/UL (ref 3.81–5.12)
RBC # BLD AUTO: 2.73 MILLION/UL (ref 3.81–5.12)
SODIUM SERPL-SCNC: 144 MMOL/L (ref 136–145)
WBC # BLD AUTO: 5.99 THOUSAND/UL (ref 4.31–10.16)
WBC # BLD AUTO: 6.72 THOUSAND/UL (ref 4.31–10.16)

## 2018-12-11 PROCEDURE — G8987 SELF CARE CURRENT STATUS: HCPCS

## 2018-12-11 PROCEDURE — 99232 SBSQ HOSP IP/OBS MODERATE 35: CPT | Performed by: INTERNAL MEDICINE

## 2018-12-11 PROCEDURE — 85027 COMPLETE CBC AUTOMATED: CPT | Performed by: INTERNAL MEDICINE

## 2018-12-11 PROCEDURE — 97167 OT EVAL HIGH COMPLEX 60 MIN: CPT

## 2018-12-11 PROCEDURE — 85610 PROTHROMBIN TIME: CPT | Performed by: INTERNAL MEDICINE

## 2018-12-11 PROCEDURE — G8988 SELF CARE GOAL STATUS: HCPCS

## 2018-12-11 PROCEDURE — 80048 BASIC METABOLIC PNL TOTAL CA: CPT | Performed by: INTERNAL MEDICINE

## 2018-12-11 PROCEDURE — 85025 COMPLETE CBC W/AUTO DIFF WBC: CPT | Performed by: INTERNAL MEDICINE

## 2018-12-11 RX ADMIN — FLUTICASONE FUROATE AND VILANTEROL TRIFENATATE 1 PUFF: 100; 25 POWDER RESPIRATORY (INHALATION) at 09:29

## 2018-12-11 RX ADMIN — CLOPIDOGREL BISULFATE 75 MG: 75 TABLET ORAL at 09:26

## 2018-12-11 RX ADMIN — DILTIAZEM HYDROCHLORIDE 180 MG: 90 CAPSULE, EXTENDED RELEASE ORAL at 09:28

## 2018-12-11 RX ADMIN — CARBAMAZEPINE 100 MG: 100 TABLET, CHEWABLE ORAL at 22:11

## 2018-12-11 RX ADMIN — FUROSEMIDE 40 MG: 40 TABLET ORAL at 09:26

## 2018-12-11 RX ADMIN — ALLOPURINOL 300 MG: 300 TABLET ORAL at 09:26

## 2018-12-11 RX ADMIN — ATORVASTATIN CALCIUM 10 MG: 10 TABLET, FILM COATED ORAL at 09:26

## 2018-12-11 RX ADMIN — BUPROPION HYDROCHLORIDE 150 MG: 150 TABLET, EXTENDED RELEASE ORAL at 09:27

## 2018-12-11 RX ADMIN — WARFARIN SODIUM 10 MG: 5 TABLET ORAL at 17:55

## 2018-12-11 RX ADMIN — CARBAMAZEPINE 100 MG: 100 TABLET, CHEWABLE ORAL at 09:28

## 2018-12-11 RX ADMIN — DIVALPROEX SODIUM 250 MG: 250 TABLET, EXTENDED RELEASE ORAL at 09:26

## 2018-12-11 RX ADMIN — DILTIAZEM HYDROCHLORIDE 180 MG: 90 CAPSULE, EXTENDED RELEASE ORAL at 22:10

## 2018-12-11 RX ADMIN — CARBAMAZEPINE 100 MG: 100 TABLET, CHEWABLE ORAL at 15:48

## 2018-12-11 RX ADMIN — FENOFIBRATE 48 MG: 48 TABLET ORAL at 09:26

## 2018-12-11 RX ADMIN — METOPROLOL TARTRATE 50 MG: 50 TABLET, FILM COATED ORAL at 22:10

## 2018-12-11 RX ADMIN — LEVOTHYROXINE SODIUM 88 MCG: 88 TABLET ORAL at 05:11

## 2018-12-11 RX ADMIN — METOPROLOL TARTRATE 50 MG: 50 TABLET, FILM COATED ORAL at 09:26

## 2018-12-11 NOTE — RESTORATIVE TECHNICIAN NOTE
Restorative Specialist Mobility Note       Activity: Bedrest, Dangle, Stand at bedside, Turn, Chair     Assistive Device: Front wheel walker     Ambulation Response: Tolerated fairly well  Repositioned: Sitting, Up in chair              Anti-Embolism Device On: Bilateral, Sequential compression devices, below knee           Please call ext  [phone] with questions regarding bracing instruction and or adjustment(s)

## 2018-12-11 NOTE — PLAN OF CARE
Problem: OCCUPATIONAL THERAPY ADULT  Goal: Performs self-care activities at highest level of function for planned discharge setting  See evaluation for individualized goals  Treatment Interventions: ADL retraining, Functional transfer training, UE strengthening/ROM, Endurance training, Cognitive reorientation, Patient/family training, Equipment evaluation/education, Compensatory technique education, Continued evaluation, Energy conservation, Activityengagement          See flowsheet documentation for full assessment, interventions and recommendations  Limitation: Decreased ADL status, Decreased UE strength, Decreased Safe judgement during ADL, Decreased cognition, Decreased endurance, Decreased self-care trans, Decreased high-level ADLs  Prognosis: Poor  Assessment: Pt is a 68year old female seen for OT s/p admission to hospitals after transfer from [facility] for angioplasty, where the stent migrated into greater vessels necessitating urgent vascular intervention  Pt s/p L SFA angioplasty, which was complicated by groin hematoma  Comorbidities include a h/o dementia, COPD-chronic hypoxic respiratory failure, obstructive sleep apnea, chronic diastolic heart failure, atrial fibrillation, atrial flutter, central hypertension, cognitive impairment, CKD stage 3, and chronic indwelling  Davis catheter due to neurogenic bladder  Pt with active OT orders and activity as tolerated orders  Pt [facility] at [facility] and reports that there are 10-15 ERON  Pt is a very poor historian  Per chart, pt required assistance with bathing, assistance with IADLs, but was able to complete dressing and functional mobility I'ly  Pt is currently demonstrating the following occupational deficits: eating with set-up, grooming with set-up, UB bathing with modA, LB bathing with maxA, UB dressing with modA, LB dressing with maxA, toileting with Gabrielle, and functional transfers with Gabrielle   Impairments that are currently contributing to patient's decline in independence in these occupations include: cognitive deficits, functional mobility, strength, endurance and activity tolerance  Overall, pt scored 45/100 on the Barthel Index  Recommend STR vs home with increased support and home OT upon d/c  Pt is to continue to benefit from skilled occupational therapy services while in the hospital to maximize functioning and independence in daily activities  See below for OT goals       OT Discharge Recommendation:  (STR vs home with increased support and home OT )  OT - OK to Discharge: Yes (when medically stable)      Comments: NATHANAEL Alicia, OTR/L

## 2018-12-11 NOTE — PLAN OF CARE
Problem: Potential for Falls  Goal: Patient will remain free of falls  INTERVENTIONS:  - Assess patient frequently for physical needs  -  Identify cognitive and physical deficits and behaviors that affect risk of falls    -  [place] fall precautions as indicated by assessment   - Educate patient/family on patient safety including physical limitations  - Instruct patient to call for assistance with activity based on assessment  - Modify environment to reduce risk of injury  - Consider OT/PT consult to assist with strengthening/mobility   Outcome: Progressing      Problem: Prexisting or High Potential for Compromised Skin Integrity  Goal: Skin integrity is maintained or improved  INTERVENTIONS:  - Identify patients at risk for skin breakdown  - Assess and monitor skin integrity  - Assess and monitor nutrition and hydration status  - Monitor labs (i e  albumin)  - Assess for incontinence   - Turn and reposition patient  - Assist with mobility/ambulation  - Relieve pressure over bony prominences  - Avoid friction and shearing  - Provide appropriate hygiene as needed including keeping skin clean and dry  - Evaluate need for skin moisturizer/barrier cream  - Collaborate with interdisciplinary team (i e  Nutrition, Rehabilitation, etc )   - Patient/family teaching   Outcome: Progressing      Problem: DISCHARGE PLANNING - CARE MANAGEMENT  Goal: Discharge to post-acute care or home with appropriate resources  INTERVENTIONS:  - Conduct assessment to determine patient/family and health care team treatment goals, and need for post-acute services based on payer coverage, community resources, and patient preferences, and barriers to discharge  - Address psychosocial, clinical, and financial barriers to discharge as identified in assessment in conjunction with the patient/family and health care team  - Arrange appropriate level of post-acute services according to patients   needs and preference and payer coverage in collaboration with the physician and health care team  - Communicate with and update the patient/family, physician, and health care team regarding progress on the discharge plan  - Arrange appropriate transportation to post-acute venues   Outcome: Progressing      Problem: CARDIOVASCULAR - ADULT  Goal: Maintains optimal cardiac output and hemodynamic stability  INTERVENTIONS:  - Monitor I/O, vital signs and rhythm  - Monitor for S/S and trends of decreased cardiac output i e  bleeding, hypotension  - Administer and titrate ordered vasoactive medications to optimize hemodynamic stability  - Assess quality of pulses, skin color and temperature  - Assess for signs of decreased coronary artery perfusion - ex   Angina  - Instruct patient to report change in severity of symptoms   Outcome: Progressing      Problem: RESPIRATORY - ADULT  Goal: Achieves optimal ventilation and oxygenation  INTERVENTIONS:  - Assess for changes in respiratory status  - Assess for changes in mentation and behavior  - Position to facilitate oxygenation and minimize respiratory effort  - Oxygen administration by appropriate delivery method based on oxygen saturation (per order) or ABGs  - Initiate smoking cessation education as indicated  - Encourage broncho-pulmonary hygiene including cough, deep breathe, Incentive Spirometry  - Assess the need for suctioning and aspirate as needed  - Assess and instruct to report SOB or any respiratory difficulty  - Respiratory Therapy support as indicated   Outcome: Progressing      Problem: GENITOURINARY - ADULT  Goal: Maintains or returns to baseline urinary function  INTERVENTIONS:  - Assess urinary function  - Encourage oral fluids to ensure adequate hydration  - Administer IV fluids as ordered to ensure adequate hydration  - Administer ordered medications as needed  - Offer frequent toileting  - Follow urinary retention protocol if ordered   Outcome: Progressing    Goal: Urinary catheter remains patent  INTERVENTIONS:  - Assess patency of urinary catheter  - If patient has a chronic baca, consider changing catheter if non-functioning  - Follow guidelines for intermittent irrigation of non-functioning urinary catheter   Outcome: Progressing      Problem: METABOLIC, FLUID AND ELECTROLYTES - ADULT  Goal: Electrolytes maintained within normal limits  INTERVENTIONS:  - Monitor labs and assess patient for signs and symptoms of electrolyte imbalances  - Administer electrolyte replacement as ordered  - Monitor response to electrolyte replacements, including repeat lab results as appropriate  - Instruct patient on fluid and nutrition as appropriate   Outcome: Progressing      Problem: SKIN/TISSUE INTEGRITY - ADULT  Goal: Skin integrity remains intact  INTERVENTIONS  - Identify patients at risk for skin breakdown  - Assess and monitor skin integrity  - Assess and monitor nutrition and hydration status  - Monitor labs (i e  albumin)  - Assess for incontinence   - Turn and reposition patient  - Assist with mobility/ambulation  - Relieve pressure over bony prominences  - Avoid friction and shearing  - Provide appropriate hygiene as needed including keeping skin clean and dry  - Evaluate need for skin moisturizer/barrier cream  - Collaborate with interdisciplinary team (i e  Nutrition, Rehabilitation, etc )   - Patient/family teaching   Outcome: Progressing    Goal: Incision(s), wounds(s) or drain site(s) healing without S/S of infection  INTERVENTIONS  - Assess and document risk factors for skin impairment   - Assess and document dressing, incision, wound bed, drain sites and surrounding tissue  - Initiate Nutrition services consult and/or wound management as needed   Outcome: Progressing      Problem: MUSCULOSKELETAL - ADULT  Goal: Maintain or return mobility to safest level of function  INTERVENTIONS:  - Assess patient's ability to carry out ADLs; assess patient's baseline for ADL function and identify physical deficits which impact ability to perform ADLs (bathing, care of mouth/teeth, toileting, grooming, dressing, etc )  - Assess/evaluate cause of self-care deficits   - Assess range of motion  - Assess patient's mobility; develop plan if impaired  - Assess patient's need for assistive devices and provide as appropriate  - Encourage maximum independence but intervene and supervise when necessary  - Involve family in performance of ADLs  - Assess for home care needs following discharge   - Request OT consult to assist with ADL evaluation and planning for discharge  - Provide patient education as appropriate   Outcome: Progressing    Goal: Maintain proper alignment of affected body part  INTERVENTIONS:  - Support, maintain and protect limb and body alignment  - Provide pt/fam with appropriate education   Outcome: Progressing

## 2018-12-11 NOTE — ASSESSMENT & PLAN NOTE
s/p angiogram showing initial stent fracture, placement of B TEMO stents, R EIA distal stent extension for dissection     Repeat arterial Dopplers were obtained given right lower extremity edema which shows improvement in bilateral ANABEL  Venous duplex: 12/9  neg for DVT  ANABEL 12/9: ANABEL: R0 77, L 0 99  PLAN:  Continue with Plavix and statin  Continue anticoagulation with Coumadin  INR still subtherapeutic  Appreciate vascular surgery input  Outpatient vascular surgery follow-up

## 2018-12-11 NOTE — OCCUPATIONAL THERAPY NOTE
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    JESENIA (obstructive sleep apnea)    Intermittent claudication of right lower extremity due to atherosclerosis (HCC)    Atrial fibrillation (HCC)    SOB (shortness of breath)    Chronic indwelling Davis catheter    Bradycardia    Chronic hypoxemic respiratory failure ([facility]    Essential hypertension    Peripheral artery disease (HCC)    Hematoma    Contrast dye induced nephropathy     Past Medical History  Past Medical History:   Diagnosis Date    Atrial fibrillation ([facility]     Brain aneurysm     CAD (coronary artery disease)     Cardiac disease     had stents 12 years ago in [facility]    CHF (congestive heart failure) ([facility]     CKD (chronic kidney disease)     COPD (chronic obstructive pulmonary disease) (HCC)     Dementia     Dementia     Disease of thyroid gland     GERD (gastroesophageal reflux disease)     Hyperlipidemia     Hyperlipidemia     Hypertension     MI (myocardial infarction) ([facility]     Migraine     Renal disorder     Seizures ([facility]     Stroke [facility])      Past Surgical History  Past Surgical History:   Procedure Laterality Date    APPENDECTOMY      ARTERIOGRAM N/A 12/7/2018    Procedure: ARTERIOGRAM WITH STENT PLACEMENT;  Surgeon: Clyde Reddy MD;  [facility]: [facility];  Service: Vascular    BLADDER TUMOR EXCISION      BRAIN SURGERY      1998  clip right frontal lobe    CARDIAC SURGERY  COLONOSCOPY      CORONARY STENT PLACEMENT      5 stents    EYE SURGERY      IR ABDOMINAL ANGIOGRAPHY / INTERVENTION  12/7/2018    MANDIBLE FRACTURE SURGERY      TONSILLECTOMY      TUBAL LIGATION      UTERINE FIBROID SURGERY           12/11/18 1000   Note Type   Note type Eval only   Restrictions/Precautions   Weight Bearing Precautions Per Order No   Other Precautions Cognitive; Chair Alarm;O2;Telemetry; Fall Risk   Pain Assessment   Pain Assessment No/denies pain   Pain Score No Pain   Home Living   Type of Home [facility] personal care home)   Home Layout (10-15STE)   Bathroom Shower/Tub Tub/shower unit   Bathroom Toilet Standard   Bathroom Equipment Grab bars in shower; [address]  (rollator)   Additional Comments Pt lives in [facility] and reports that there are 10-15STE  Prior Function   Level of [place] Needs assistance with IADLs; Needs assistance with ADLs and functional mobility  (assistance with showering)   Lives With Facility staff   Receives Help From (Facility staff)   ADL Assistance Needs assistance  (Assistance with showering, could dress I'ly)   IADLs Needs assistance   Falls in the last 6 months 5 to 10  (pt reports 7 falls)   Vocational Retired   Comments Pt required assistance with some ADLs (showering, but not dressing), assistance with IADLs and assistance with functional mobility using rw   Lifestyle   Autonomy Pt required assistance with some ADLs (showering, but not dressing), assistance with IADLs and assistance with functional mobility using rw      Reciprocal Relationships Daughter   Psychosocial   Psychosocial (WDL) WDL   Subjective   Subjective Pt is a poor historian and reports that she is unsure for many PLOF and home set-up questions   ADL   Eating Assistance 5  Supervision/Setup   Grooming Assistance 5  Supervision/Setup   UB Bathing Assistance 3  Moderate Assistance   LB [address] 2  Maximal Assistance   UB Dressing Assistance 3  Moderate Assistance   LB Dressing Assistance 2  Maximal Assistance   Toileting Assistance  4  Minimal Assistance   Bed Mobility   Additional Comments Pt seated in chair upon therapist entry   Transfers   Sit to Stand 4  Minimal assistance   Additional items Assist x 1; Increased time required;Armrests   Stand to Sit 4  Minimal assistance   Additional items Assist x 1; Increased time required;Armrests   Toilet transfer 3  Moderate assistance   Additional items Assist x 1; Increased time required;Standard toilet  (grab bars; uncontrolled descent)   Functional Mobility   Functional Mobility 4  Minimal assistance   Additional Comments Pt ambulated from chair to bathroom with Gabrielle using rw   Additional items Rolling walker   Balance   Static Sitting Fair +   Dynamic Sitting Fair   Static Standing Fair -   Dynamic Standing Fair -   Ambulatory Poor +   Activity Tolerance   Activity Tolerance Patient limited by fatigue   Medical Staff Made Aware Per RN, pt okay to see for OT   Nurse Made Aware Yes   RUE Assessment   RUE Assessment WFL   LUE Assessment   LUE Assessment WFL   Hand Function   Gross Motor Coordination Functional   Fine Motor Coordination Functional   Vision-Basic Assessment   Current Vision Wears glasses all the time   Cognition   Overall Cognitive Status Impaired   Arousal/Participation Alert; Cooperative;Responsive   Attention Attends with cues to redirect   Orientation Level Oriented to person;Oriented to place; Disoriented to time;Disoriented to situation  (Pt states correct year, but reports that the month is July)   Memory Decreased long term memory;Decreased recall of biographical information;Decreased short term memory;Decreased recall of recent events;Decreased recall of precautions   Following Commands Follows one step commands with increased time or repetition   Assessment   Limitation Decreased ADL status; Decreased UE strength;Decreased Safe judgement during ADL;Decreased cognition;Decreased endurance;Decreased self-care trans;Decreased high-level ADLs   Prognosis Poor   Assessment Pt is a 68year old female seen for OT s/p admission to [facility] after transfer [facility] for angioplasty, where the stent migrated into greater vessels necessitating urgent vascular intervention  Pt s/p L SFA angioplasty, which was complicated by groin hematoma  Comorbidities include a h/o dementia, COPD-chronic hypoxic respiratory failure, obstructive sleep apnea, chronic diastolic heart failure, atrial fibrillation, atrial flutter, central hypertension, cognitive impairment, CKD stage 3, and chronic indwelling  Davis catheter due to neurogenic bladder  Pt with active OT orders and activity as tolerated orders  Pt lives at [facility] and reports that there are 10-15 ERON  Pt is a very poor historian  Per chart, pt required assistance with bathing, assistance with IADLs, but was able to complete dressing and functional mobility I'ly  Pt is currently demonstrating the following occupational deficits: eating with set-up, grooming with set-up, UB bathing with modA, LB bathing with maxA, UB dressing with modA, LB dressing with maxA, toileting with Gabrielle, and functional transfers with Gabrielle  Impairments that are currently contributing to patient's decline in independence in these occupations include: cognitive deficits, functional mobility, strength, endurance and activity tolerance  Overall, pt scored 45/100 on the Barthel Index  Recommend STR vs home with increased support and home OT upon d/c  Pt is to continue to benefit from skilled occupational therapy services while in the hospital to maximize functioning and independence in daily activities  See below for OT goals  Plan   Treatment Interventions ADL retraining;Functional transfer training;UE strengthening/ROM; Endurance training;Cognitive reorientation;Patient/family training;Equipment evaluation/education; Compensatory technique education;Continued evaluation; Energy conservation; Activityengagement   Goal Expiration Date 12/21/18   OT Frequency 3-5x/wk   Recommendation   OT Discharge Recommendation (STR vs home with increased support and home OT )   OT - OK to Discharge Yes  (when medically stable)   Barthel Index   Feeding 10   Bathing 0   Grooming Score 5   Dressing Score 5   Bladder Score 0   Bowels Score 10   Toilet Use Score 5   Transfers (Bed/Chair) Score 10   Mobility (Level Surface) Score 0   Stairs Score 0   Barthel Index Score 45     Goals:  Pt will complete functional transfers with Rafy using DME and AD as appropriate  Pt will complete UB dressing with supervision  Pt will complete LB dressing with supervision using DME and AD as appropriate  Pt will participate in ongoing cognitive assessment with good participation for safe discharge/planning      Magui Corraln, MOT, OTR/L

## 2018-12-11 NOTE — ASSESSMENT & PLAN NOTE
Hemoglobin dropped down from 11 to 10-8  7  Likely dilutional  without any signs of active bleeding  Trend CBC    Check vitamin B12, folate, TSH

## 2018-12-11 NOTE — UTILIZATION REVIEW
Continued Stay Review    Date: 12/11/18 Tuesday ACUTE MED SURG LEVEL OF CARE    Vital Signs: /65   Pulse 57   Temp 99 2 °F (37 3 °C) (Oral)   Resp 18   Ht 5' (1 524 m)   Wt 83 8 kg (184 lb 11 9 oz)   SpO2 95%   BMI 36 08 kg/m²      12/10 0701  12/11 0700 12/11 0701 12/11 1555  Most Recent     Temperature (°F) 9899 4 9899 2  99 2 (37 3)    Pulse 5967 5562  57    Respirations 18 1820  18    Blood Pressure 112/58186/81 128/60142/65  136/65    SpO2 (%) 8796 95  95       12/09 0701  12/10 0700 12/10 0701  12/11 0700 12/11 0701 12/12 0700   P  O   480 480   I V  (mL/kg) 367 5 (4 3)     Total Intake(mL/kg) 367 5 (4 3) 480 (5 7) 480 (5 7)   Urine (mL/kg/hr) 450 (0 2) 2450 (1 2) 500 (0 7)   Total Output 450 2450 500   Net -82 5 -1970 -20       Diet Regular; Regular       IV ACCESS    Medications:   Scheduled Meds:   Current Facility-Administered Medications:  albuterol 2 puff Inhalation Q6H PRN Bonnie Reddy MD   albuterol 2 5 mg Nebulization Q6H PRN Bonnie Reddy MD   allopurinol 300 mg Oral QAM Bonnie Reddy MD   atorvastatin 10 mg Oral Daily Anil Mendez MD   buPROPion 150 mg Oral Daily Anil Mendez MD   carBAMazepine 100 mg Oral TID Bonine Reddy MD   clopidogrel 75 mg Oral Daily Anil Mendez MD   diltiazem 180 mg Oral Q12H [address] [facility], [place]   divalproex sodium 250 mg Oral Daily Anil Mendez MD   fenofibrate 48 mg Oral Daily Anil Mendez MD   fluticasone-vilanterol 1 puff Inhalation Daily Anil Mendez MD   furosemide 40 mg Oral Daily Luisa Leyva MD   levothyroxine 88 mcg Oral Early Morning Bonnie Reddy MD   metoprolol tartrate 50 mg Oral Q12H [address] [facility], [place]   ondansetron 4 mg Intravenous Q6H PRN Anil Mendez MD   senna-docusate sodium 1 tablet Oral Daily PRN Anil Mendez MD   warfarin 10 mg Oral Daily (warfarin) Luisa Leyva MD     PRN Meds:     albuterol    albuterol    ondansetron    senna-docusate sodium    LABS/Diagnostic Results:   CBC and differential [[id number]] (Abnormal) Collected: 12/11/18 0507   Lab Status: Final result Specimen: Blood from Arm, Left Updated: 12/11/18 0544    WBC 5 99 4 31 - 10 16 Thousand/uL     RBC 2 64 (L) 3 81 - 5 12 Million/uL     Hemoglobin 8 7 (L) 11 5 - 15 4 g/dL     Hematocrit 27 5 (L) 34 8 - 46 1 %      (H) 82 - 98 fL     MCH 33 0 26 8 - 34 3 pg     MCHC 31 6 31 4 - 37 4 g/dL     RDW 13 0 11 6 - 15 1 %     MPV 12 8 (H) 8 9 - 12 7 fL     Platelets 338 (L) 027 - 390 Thousands/uL     nRBC 0 /100 WBCs     Neutrophils Relative 54 43 - 75 %     Immat GRANS % 1 0 - 2 %     Lymphocytes Relative 28 14 - 44 %     Monocytes Relative 13 (H) 4 - 12 %     Eosinophils Relative 4 0 - 6 %     Basophils Relative 0 0 - 1 %     Neutrophils Absolute 3 22 1 85 - 7 62 Thousands/µL     Immature Grans Absolute 0 06 0 00 - 0 20 Thousand/uL     Lymphocytes Absolute 1 70 0 60 - 4 47 Thousands/µL     Monocytes Absolute 0 77 0 17 - 1 22 Thousand/µL     Eosinophils Absolute 0 22 0 00 - 0 61 Thousand/µL     Basophils Absolute 0 02 0 00 - 0 10 Thousands/µL    Basic metabolic panel [[id number]] Collected: 12/11/18 0507   Lab Status: Final result Specimen: Blood from Arm, Left Updated: 12/11/18 0619    Sodium 144 136 - 145 mmol/L     Potassium 3 5 3 5 - 5 3 mmol/L     Chloride 106 100 - 108 mmol/L     CO2 30 21 - 32 mmol/L     ANION GAP 8 4 - 13 mmol/L     BUN 21 5 - 25 mg/dL     Creatinine 1 18 0 60 - 1 30 mg/dL     Comment: Standardized to IDMS reference method       Glucose 104 65 - 140 mg/dL         Calcium 8 9 8 3 - 10 1 mg/dL     eGFR 46 ml/min/1 73sq m          Protime-INR [[id number]] (Abnormal) Collected: 12/11/18 0507   Lab Status: Final result Specimen: Blood from Arm, Left Updated: 12/11/18 0644    Protime 18 0 (H) 11 8 - 14 2 seconds     INR 1 48 (H) 0 86 - 1 17          Age/Sex: 68 y o  female     Assessment/Plan:  * Peripheral artery disease (HCC)   Assessment & Plan     Status post now s/p angiogram showing initial stent fracture, placement of B TEMO stents, R EIA distal stent extension for dissection     Repeat arterial Dopplers were obtained given right lower extremity edema which shows improvement in bilateral ANABEL  Venous duplex: 12/9  neg for DVT  ANABEL 12/9: ANABEL: R0 77, L 0 99       PLAN:  Continue with Plavix and statin  Continue anticoagulation with Coumadin  Appreciate vascular surgery input  Outpatient vascular surgery follow-up          Stage 3 chronic kidney disease ([facility] )   Assessment & Plan     Creatinine 1 09  Renal function stable  Restart home dose lasix       Ambulatory dysfunction   Assessment & Plan     Physical therapy recommends rehab   is aware       Seizure disorder ([facility] )   Assessment & Plan     Continue with home dose carbamazepine and Depakote       Hyperlipidemia   Assessment & Plan     Continue statin      Atrial flutter (HCC)   Assessment & Plan     Rate control on current dose of Cardizem and metoprolol  On Coumadin for anticoagulation with subtherapeutic INR  Will give 10 mg  Coumadin tonight, check INR tomorrow       Chronic respiratory failure (HCC)   Assessment & Plan     Stable  O2 supplementation as needed to maintain O2 sats between 88-92 %  Incentive spirometry      COPD (chronic obstructive pulmonary disease) (HCC)   Assessment & Plan     Not in exacerbation  Continue with current dose of Breo Ellipta, albuterol  Has oxygen at home which she uses intermittently       HTN (hypertension)   Assessment & Plan     Continue with 180 mg daily Cardizem and 50 mg b i d  Lopressor  Restart 40 mg Lasix which she is on at home             VTE Pharmacologic Prophylaxis:   Pharmacologic: Warfarin (Coumadin)  Mechanical VTE Prophylaxis in Place:  Yes     Current Patient Status: Inpatient   Certification Statement: The patient will continue to require additional inpatient hospital stay due to Pending placement     Discharge Plan:  Discharge to rehab pending placement             Discharge Plan:   [address] REHAB - WHEN MEDICALLY CLEARED      *PER PT 12/10/18  Plan   Treatment/Interventions Functional transfer training;LE strengthening/ROM; Therapeutic exercise; Endurance training;Patient/family training;Equipment eval/education; Bed mobility;Gait training   Progress Slow progress, decreased activity tolerance   PT Frequency (4-6x/week)   Recommendation   Recommendation Post acute IP rehab     CASE MANAGEMENT FOLLOWING CLOSELY FOR ALL DISCHARGE NEEDS

## 2018-12-11 NOTE — OP NOTE
OPERATIVE REPORT  PATIENT NAME: Kaity Blanco    :  [birth year]  MRN: [id number]  Pt Location: [facility] OR ROOM 02    SURGERY DATE: 2018    Surgeon(s) and Role:     Corry Reddy MD - Primary     * Jd Muniz MD - Assitant    Preop Diagnosis:  PAD with claudication  maldeployment of right iliac artery stent with device fracture    Postop Diagnosis:  PAD with claudication  maldeployment of right iliac artery stent with device fracture  Aortoiliac dissection    Procedure(s) (LRB):  1) Aortogram  2) B TEMO viabahn stents, 8x50mm  3) B TEMO post-dilitation with 7x60mm  balloons  4) R EIA Innova stent, 7x80mm  5) B femoral access with 7F sheaths and sucessful Minx closure    Specimen(s):  none    Estimated Blood Loss:   Minimal    Drains:  Urethral Catheter Double-lumen 20 Fr  (Active)   Site Assessment Clean;Skin intact 2018  7:32 AM   Collection Container Standard drainage bag 2018  7:32 AM   Securement Method Securing device (Describe) 2018  7:32 AM   Output (mL) 625 mL 2018 12:07 AM   Number of days:        Anesthesia Type:   GETA    Operative Indications:  Patient is a 69 yo F who was undergoing elective angiogram procedure at [facility] for PAD with RLE claudication  There was treatment of an SFA stenosis with PTA and stent  There was misdeployment of a stent intended to treat a right external iliac artery dissection after angioplasty but this stent deployed from the external iliac along the right common iliac, and up and over the aortic bifurcation just into the left common iliac artery  At some point after deployment, the stent fractured and was sitting at the aortic bifurcation  The patient was transferred to [facility] with sheaths in place for further intervention  No evidence of leg ischemia prior to or after procedure completion      Operative Findings:  1) Patent abdominal aorta with evidence of dissection along the left lateral aspect  2) Patient left common iliac, internal iliac, and external iliac arteries with evidence of dissection along the left lateral aspect of the common iliac artery; patent right common iliac, internal iliac, and external iliac arteries; there is evidence of dissection throughout the stented segment and into the unstented external iliac artery extending down to the femoral head; there is a stent which begins in the mid right external iliac artery and extends over the iliac bifurcation, through the right common iliac artery and into the distal aorta; there is a 1-2cm length of fractured stent which is lying horizontally in the distal aorto over the aortic bifurcation  3) There is a short 6F sheath in the right common femoral artery with the tip in a dissection plane and a 30 cm 6F sheath in the left common femoral artery with the tip in the left iliac artery at the start of the procedure    Complications:   None    Procedure and Technique:  The patient was confused and unable to give consent  Attempts were made to reach the patients family (daughter), however we were unable to reach them  Given the emergent nature of the situation, the patient was taken to the hybrid OR and placed in the supine position with the arms tucked  Due to her confusion, she was given anesthesia and endotracheally intubated  She was prepped and draped in the usual sterile fashion including the existed bilateral groin sheaths  A timeout was performed  Heparin gtt was continued throughout the case  Starting on the right side, we tried to pass a Bentson wire into the iliac artery via the sheath, however this did not pass easily  Angiography via the sheath shows a dissection in the external iliac artery leading up to the stent and in the stented segment  We then inserted a bentson wire via the left groin sheath and using this and an angled glide catheter, were able to traverse the iliac segment in pass into the aorta beside the fractured stent fragment    An abdominal aortogram was performed which shows antegrade flow down the bilateral iliacs with dissection in the aorta and bilateral iliacs  An angled glide catheter was inserted into the right groin sheath and after pulling back the sheath into a true lumen segment, were able to pass the wire and catheter within the stent and into the aorta  An over the wire Vanessa catheter 5 5mm was inserted over the wire and inflated and we were able to pass this through the iliac segment, prooving that we were within the stent and not behind it  The wire passed through the fractured end of the stent and excluded the displaced segment sitting in the aorta  The sheaths were each exchanged for a 7x30cm sheath and the wires for V18 wires  Two 8x50mm Viabahn stents were selected and deployed simultaneously in the common iliac arteries bilaterally, excluding the fractured piece of stent and raising the aortic bifurcation  These were post-dilated with 7x60mm balloons  An omniflush catheter was inserted and aortography showed brisk flow throughout the stented segments without significant stenosis  The left iliac dissection appeared to be well treated with this stent and the left internal iliac artery remained patent  On the right side, a 7x80mm Innova stent was selected and deployed in the distal external iliac artery, overlapping the current stent and extending to the level of the femoral head to cover the persistent dissection in this area  Completion revealed brisk flow throughout the bilateral iliac arteries in the femoral arteries  Two mynx closure devices were selected and deployed successfully at the access sites  The patient was transferred to the PACU for postoperative care      2+ DP pulses at completion B    I was present for the entire procedure, A qualified resident physician was not available and An assistant was required because of skills and techniques relevant to speciality, multiple access sites, and the complexity of the case      Patient Disposition:  PACU     SIGNATURE: Arely Reddy MD  DATE: December 11, 2018  TIME: 2:35 PM

## 2018-12-11 NOTE — SOCIAL WORK
Call placed to [facility], [phone], and spoke to [facility]  She reports patient sees an outside psychiatrist but was unable to provide the name  Patient has two stays at [facility] unit in [facility] 2017 and Jan 2018  OPTIONs paperwork was faxed to [facility]

## 2018-12-11 NOTE — PROGRESS NOTES
Progress Note Tayler Leonard [birth year], 68 y o  female MRN: [id number]    Unit/Bed#: [facility] 529-01 Encounter: [id number]    Primary Care Provider: Helen Zazueta DO   Date and time admitted to hospital: 12/7/2018  3:03 PM        * Peripheral artery disease ([facility] 75 )   Assessment & Plan    s/p angiogram showing initial stent fracture, placement of B TEMO stents, R EIA distal stent extension for dissection     Repeat arterial Dopplers were obtained given right lower extremity edema which shows improvement in bilateral ANABEL  Venous duplex: 12/9  neg for DVT  ANABEL 12/9: ANABEL: R0 77, L 0 99  PLAN:  Continue with Plavix and statin  Continue anticoagulation with Coumadin  INR still subtherapeutic  Appreciate vascular surgery input  Outpatient vascular surgery follow-up  Macrocytic anemia   Assessment & Plan    Hemoglobin dropped down from 11 to 10-8  7  Likely dilutional  without any signs of active bleeding  Trend CBC  Check vitamin B12, folate, TSH  Stage 3 chronic kidney disease ([facility] 75 )   Assessment & Plan    Creatinine at baseline  Renal function stable  Restart home dose lasix  Ambulatory dysfunction   Assessment & Plan    Physical therapy recommends rehab   is aware  Seizure disorder [facility])   Assessment & Plan    Continue with home dose carbamazepine and Depakote  Hyperlipidemia   Assessment & Plan    Continue statin     Atrial flutter (HCC)   Assessment & Plan    Rate control on current dose of Cardizem and metoprolol  On Coumadin for anticoagulation with subtherapeutic INR  Will give 10 mg  Coumadin tonight, check INR tomorrow  Chronic respiratory failure (HCC)   Assessment & Plan    Stable  O2 supplementation as needed to maintain O2 sats between 88-92 %  Incentive spirometry     COPD (chronic obstructive pulmonary disease) ([facility])   Assessment & Plan    Not in exacerbation  Continue with current dose of Breo Ellipta, albuterol    Has oxygen at home which she uses intermittently  HTN (hypertension)   Assessment & Plan    Continue with 180 mg daily Cardizem and 50 mg b i d  Lopressor  Restart 40 mg Lasix which she is on at home  VTE Pharmacologic Prophylaxis:   Pharmacologic: Warfarin (Coumadin)  Mechanical VTE Prophylaxis in Place: Yes    Patient Centered Rounds: I have performed bedside rounds with nursing staff today  Discussions with Specialists or Other Care Team Provider: SHABANA    Education and Discussions with Family / Patient:  Discussed plan of care with the patient    Time Spent for Care: 30 minutes  More than 50% of total time spent on counseling and coordination of care as described above  Current Length of Stay: 4 day(s)    Current Patient Status: Inpatient   Certification Statement: The patient will continue to require additional inpatient hospital stay due to Not medically stable    Discharge Plan:  Rehab when medically stable    Code Status: Level 1 - Full Code      Subjective:   No overnight events reported  Patient denies any acute discomfort  Objective:     Vitals:   Temp (24hrs), Av 8 °F (37 1 °C), Min:98 °F (36 7 °C), Max:99 4 °F (37 4 °C)    Temp:  [98 °F (36 7 °C)-99 4 °F (37 4 °C)] 99 2 °F (37 3 °C)  HR:  [55-67] 57  Resp:  [18-20] 18  BP: (123-186)/(58-81) 136/65  SpO2:  [94 %-96 %] 95 %  Body mass index is 36 08 kg/m²  Input and Output Summary (last 24 hours): Intake/Output Summary (Last 24 hours) at 18 1751  Last data filed at 18 1318   Gross per 24 hour   Intake              480 ml   Output             2400 ml   Net            -1920 ml       Physical Exam:     Physical Exam  Constitutional: She is oriented to person, place, and time  No distress  Eyes: Pupils are equal, round, and reactive to light  Cardiovascular: Normal rate, regular rhythm, normal heart sounds and intact distal pulses  No murmur heard  Pulmonary/Chest: Breath sounds normal  No respiratory distress  She has no wheezes   She has no rales  Abdominal: Soft  Bowel sounds are normal  She exhibits no distension  There is no tenderness  Musculoskeletal: no LE edema      Neurological: She is alert and oriented to person, place, and time  No focal motor deficits   Skin: Skin is warm         Additional Data:     Labs:      Results from last 7 days  Lab Units 12/11/18  0507   WBC Thousand/uL 5 99   HEMOGLOBIN g/dL 8 7*   HEMATOCRIT % 27 5*   PLATELETS Thousands/uL 112*   NEUTROS PCT % 54   LYMPHS PCT % 28   MONOS PCT % 13*   EOS PCT % 4       Results from last 7 days  Lab Units 12/11/18  0507   SODIUM mmol/L 144   POTASSIUM mmol/L 3 5   CHLORIDE mmol/L 106   CO2 mmol/L 30   BUN mg/dL 21   CREATININE mg/dL 1 18   ANION GAP mmol/L 8   CALCIUM mg/dL 8 9   GLUCOSE RANDOM mg/dL 104       Results from last 7 days  Lab Units 12/11/18  0507   INR  1 48*       Results from last 7 days  Lab Units 12/08/18  2033   POC GLUCOSE mg/dl 120                   * I Have Reviewed All Lab Data Listed Above  * Additional Pertinent Lab Tests Reviewed: All Labs Within Last 24 Hours Reviewed    Imaging:    VAS lower limb arterial duplex, complete bilateral   Final Result by Dina Reddy MD (12/10 1353)      VAS lower limb venous duplex study, complete bilateral   Final Result by Dina Reddy MD (12/09 1814)      IR abdominal angiography / intervention   Final Result by Trav Bass MD (12/08 2006)   Impression:    Successful treatment of fractured right common and external iliac stent with coexisting distal abdominal aortic dissection, bilateral common iliac artery dissections, and right external iliac artery dissection with placement of " kissing " common iliac    Viabahn stent grafts extending into the lower abdominal aorta with successful exclusion of the fractured stent from the circulation as described,  and significantly less contrast filling the aortic and common iliac dissections    The right external iliac    stent was extended down to the inguinal ligament to treat the external iliac dissection  A good result was obtained, as described above  Workstation performed: [id number]              Recent Cultures (last 7 days):           Last 24 Hours Medication List:     Current Facility-Administered Medications:  albuterol 2 puff Inhalation Q6H PRN [facility]   albuterol 2 5 mg Nebulization Q6H PRN Joel Medel MD   allopurinol 300 mg Oral Jeremias Arrieta Doctor, MD   atorvastatin 10 mg Oral Daily Joel Medel MD   buPROPion 150 mg Oral Daily Joel Medel MD   carBAMazepine 100 mg Oral TID [facility], MD   clopidogrel 75 mg Oral Daily Joel Medel MD   diltiazem 180 mg Oral Q12H [address] [facility] [address]   divalproex sodium 250 mg Oral Daily Joel Medel MD   fenofibrate 48 mg Oral Daily Joel Medel MD   fluticasone-vilanterol 1 puff Inhalation Daily Joel Medel MD   furosemide 40 mg Oral Daily Regina Rojas MD   levothyroxine 88 mcg Oral Early Morning Joel Medel MD   metoprolol tartrate 50 mg Oral Q12H [address] [facility], MD   ondansetron 4 mg Intravenous Q6H PRN Joel Medel MD   senna-docusate sodium 1 tablet Oral Daily PRN Joel Medel MD   warfarin 10 mg Oral Daily (warfarin) Regina Rojas MD        Today, Patient Was Seen By: Regina Rojas MD    ** Please Note: Dictation voice to text software may have been used in the creation of this document   **

## 2018-12-12 ENCOUNTER — TELEPHONE (OUTPATIENT)
Dept: CARDIOLOGY CLINIC | Facility: CLINIC | Age: 73
End: 2018-12-12

## 2018-12-12 LAB
ANION GAP SERPL CALCULATED.3IONS-SCNC: 7 MMOL/L (ref 4–13)
BASOPHILS # BLD AUTO: 0.02 THOUSANDS/ΜL (ref 0–0.1)
BASOPHILS NFR BLD AUTO: 0 % (ref 0–1)
BUN SERPL-MCNC: 23 MG/DL (ref 5–25)
CALCIUM SERPL-MCNC: 9.8 MG/DL (ref 8.3–10.1)
CHLORIDE SERPL-SCNC: 107 MMOL/L (ref 100–108)
CO2 SERPL-SCNC: 28 MMOL/L (ref 21–32)
CREAT SERPL-MCNC: 1.28 MG/DL (ref 0.6–1.3)
EOSINOPHIL # BLD AUTO: 0.24 THOUSAND/ΜL (ref 0–0.61)
EOSINOPHIL NFR BLD AUTO: 4 % (ref 0–6)
ERYTHROCYTE [DISTWIDTH] IN BLOOD BY AUTOMATED COUNT: 13.2 % (ref 11.6–15.1)
FOLATE SERPL-MCNC: 18.5 NG/ML (ref 3.1–17.5)
GFR SERPL CREATININE-BSD FRML MDRD: 42 ML/MIN/1.73SQ M
GLUCOSE SERPL-MCNC: 116 MG/DL (ref 65–140)
HCT VFR BLD AUTO: 27.7 % (ref 34.8–46.1)
HGB BLD-MCNC: 8.7 G/DL (ref 11.5–15.4)
IMM GRANULOCYTES # BLD AUTO: 0.06 THOUSAND/UL (ref 0–0.2)
IMM GRANULOCYTES NFR BLD AUTO: 1 % (ref 0–2)
INR PPP: 2.12 (ref 0.86–1.17)
INR PPP: 2.28 (ref 0.86–1.17)
LYMPHOCYTES # BLD AUTO: 1.62 THOUSANDS/ΜL (ref 0.6–4.47)
LYMPHOCYTES NFR BLD AUTO: 27 % (ref 14–44)
MCH RBC QN AUTO: 33 PG (ref 26.8–34.3)
MCHC RBC AUTO-ENTMCNC: 31.4 G/DL (ref 31.4–37.4)
MCV RBC AUTO: 105 FL (ref 82–98)
MONOCYTES # BLD AUTO: 0.81 THOUSAND/ΜL (ref 0.17–1.22)
MONOCYTES NFR BLD AUTO: 14 % (ref 4–12)
NEUTROPHILS # BLD AUTO: 3.25 THOUSANDS/ΜL (ref 1.85–7.62)
NEUTS SEG NFR BLD AUTO: 54 % (ref 43–75)
NRBC BLD AUTO-RTO: 0 /100 WBCS
PLATELET # BLD AUTO: 133 THOUSANDS/UL (ref 149–390)
PMV BLD AUTO: 12.6 FL (ref 8.9–12.7)
POTASSIUM SERPL-SCNC: 3.5 MMOL/L (ref 3.5–5.3)
PROTHROMBIN TIME: 23.8 SECONDS (ref 11.8–14.2)
PROTHROMBIN TIME: 25.2 SECONDS (ref 11.8–14.2)
RBC # BLD AUTO: 2.64 MILLION/UL (ref 3.81–5.12)
SODIUM SERPL-SCNC: 142 MMOL/L (ref 136–145)
TSH SERPL DL<=0.05 MIU/L-ACNC: 2.66 UIU/ML (ref 0.36–3.74)
VIT B12 SERPL-MCNC: 293 PG/ML (ref 100–900)
WBC # BLD AUTO: 6 THOUSAND/UL (ref 4.31–10.16)

## 2018-12-12 PROCEDURE — 85610 PROTHROMBIN TIME: CPT | Performed by: INTERNAL MEDICINE

## 2018-12-12 PROCEDURE — 84443 ASSAY THYROID STIM HORMONE: CPT | Performed by: INTERNAL MEDICINE

## 2018-12-12 PROCEDURE — 82607 VITAMIN B-12: CPT | Performed by: INTERNAL MEDICINE

## 2018-12-12 PROCEDURE — 97116 GAIT TRAINING THERAPY: CPT

## 2018-12-12 PROCEDURE — 99232 SBSQ HOSP IP/OBS MODERATE 35: CPT | Performed by: INTERNAL MEDICINE

## 2018-12-12 PROCEDURE — 82746 ASSAY OF FOLIC ACID SERUM: CPT | Performed by: INTERNAL MEDICINE

## 2018-12-12 PROCEDURE — 85610 PROTHROMBIN TIME: CPT | Performed by: SURGERY

## 2018-12-12 PROCEDURE — 85025 COMPLETE CBC W/AUTO DIFF WBC: CPT | Performed by: INTERNAL MEDICINE

## 2018-12-12 PROCEDURE — 99222 1ST HOSP IP/OBS MODERATE 55: CPT | Performed by: PSYCHIATRY & NEUROLOGY

## 2018-12-12 PROCEDURE — 97110 THERAPEUTIC EXERCISES: CPT

## 2018-12-12 PROCEDURE — 80048 BASIC METABOLIC PNL TOTAL CA: CPT | Performed by: INTERNAL MEDICINE

## 2018-12-12 RX ORDER — WARFARIN SODIUM 5 MG/1
5 TABLET ORAL
Status: DISCONTINUED | OUTPATIENT
Start: 2018-12-12 | End: 2018-12-14 | Stop reason: HOSPADM

## 2018-12-12 RX ORDER — POTASSIUM CHLORIDE 20 MEQ/1
40 TABLET, EXTENDED RELEASE ORAL ONCE
Status: COMPLETED | OUTPATIENT
Start: 2018-12-12 | End: 2018-12-12

## 2018-12-12 RX ADMIN — CARBAMAZEPINE 100 MG: 100 TABLET, CHEWABLE ORAL at 09:46

## 2018-12-12 RX ADMIN — METOPROLOL TARTRATE 50 MG: 50 TABLET, FILM COATED ORAL at 21:12

## 2018-12-12 RX ADMIN — ALBUTEROL SULFATE 2 PUFF: 90 AEROSOL, METERED RESPIRATORY (INHALATION) at 09:46

## 2018-12-12 RX ADMIN — METOPROLOL TARTRATE 50 MG: 50 TABLET, FILM COATED ORAL at 09:39

## 2018-12-12 RX ADMIN — DIVALPROEX SODIUM 250 MG: 250 TABLET, EXTENDED RELEASE ORAL at 09:39

## 2018-12-12 RX ADMIN — FENOFIBRATE 48 MG: 48 TABLET ORAL at 09:39

## 2018-12-12 RX ADMIN — WARFARIN SODIUM 5 MG: 5 TABLET ORAL at 17:39

## 2018-12-12 RX ADMIN — FLUTICASONE FUROATE AND VILANTEROL TRIFENATATE 1 PUFF: 100; 25 POWDER RESPIRATORY (INHALATION) at 09:46

## 2018-12-12 RX ADMIN — DILTIAZEM HYDROCHLORIDE 180 MG: 90 CAPSULE, EXTENDED RELEASE ORAL at 09:46

## 2018-12-12 RX ADMIN — POTASSIUM CHLORIDE 40 MEQ: 1500 TABLET, EXTENDED RELEASE ORAL at 09:39

## 2018-12-12 RX ADMIN — ATORVASTATIN CALCIUM 10 MG: 10 TABLET, FILM COATED ORAL at 09:39

## 2018-12-12 RX ADMIN — CLOPIDOGREL BISULFATE 75 MG: 75 TABLET ORAL at 09:39

## 2018-12-12 RX ADMIN — DILTIAZEM HYDROCHLORIDE 180 MG: 90 CAPSULE, EXTENDED RELEASE ORAL at 21:12

## 2018-12-12 RX ADMIN — CARBAMAZEPINE 100 MG: 100 TABLET, CHEWABLE ORAL at 17:00

## 2018-12-12 RX ADMIN — LEVOTHYROXINE SODIUM 88 MCG: 88 TABLET ORAL at 05:02

## 2018-12-12 RX ADMIN — FUROSEMIDE 40 MG: 40 TABLET ORAL at 09:39

## 2018-12-12 RX ADMIN — BUPROPION HYDROCHLORIDE 150 MG: 150 TABLET, EXTENDED RELEASE ORAL at 09:46

## 2018-12-12 RX ADMIN — CARBAMAZEPINE 100 MG: 100 TABLET, CHEWABLE ORAL at 21:12

## 2018-12-12 RX ADMIN — ALLOPURINOL 300 MG: 300 TABLET ORAL at 09:39

## 2018-12-12 NOTE — ASSESSMENT & PLAN NOTE
Hemoglobin now stable between 8-10  without any signs of active bleeding  Trend CBC  Noted normal folic acid, vitamin W69 and TSH

## 2018-12-12 NOTE — ASSESSMENT & PLAN NOTE
s/p angiogram showing initial stent fracture, placement of B TEMO stents, R EIA distal stent extension for dissection     Repeat arterial Dopplers were obtained given right lower extremity edema which shows improvement in bilateral ANABEL  Venous duplex: 12/9  neg for DVT  ANABEL 12/9: ANABEL: R0 77, L 0 99  PLAN:  Continue with Plavix and statin  Continue anticoagulation with Coumadin  INR therapeutic tpay, will decrease coumadin to 5 mg daily  Trend INR>  Appreciate vascular surgery input  Outpatient vascular surgery follow-up

## 2018-12-12 NOTE — RESTORATIVE TECHNICIAN NOTE
Restorative Specialist Mobility Note       Activity: Chair, Dangle, Stand at bedside, Turn, Ambulate in room, Ambulate in joshua     Assistive Device: Four wheel walker     Ambulation Response: Tolerated fairly well  Repositioned: Sitting, Up in chair              Anti-Embolism Device On: Bilateral, Sequential compression devices, below knee               Assisted therapy during session  For all/additional information please see therapy note(s)          Jann Waddell Restorative Technician, BS

## 2018-12-12 NOTE — ASSESSMENT & PLAN NOTE
Continue with 180 mg daily Cardizem and 50 mg b i d  Lopressor  Restarted 40 mg Lasix which she is on at home

## 2018-12-12 NOTE — PHYSICAL THERAPY NOTE
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    JESENIA (obstructive sleep apnea)    Intermittent claudication of right lower extremity due to atherosclerosis (HCC)    Atrial fibrillation (HCC)    SOB (shortness of breath)    Chronic indwelling Davis catheter    Bradycardia    Chronic hypoxemic respiratory failure ([place] [address] )    Essential hypertension    Peripheral artery disease (HCC)    Hematoma    Contrast dye induced nephropathy    Macrocytic anemia       Past Medical History  Past Medical History:   Diagnosis Date    Atrial fibrillation ([place] [address] )     Brain aneurysm     CAD (coronary artery disease)     Cardiac disease     had st[facility] 12 years [facility]    CHF (congestive heart failure) ([place] [address] )     CKD (chronic kidney disease)     COPD (chronic obstructive pulmonary disease) ([place] [address] )     Dementia     Dementia     Disease of thyroid gland     GERD (gastroesophageal reflux disease)     Hyperlipidemia     Hyperlipidemia     Hypertension     MI (myocardial infarction) ([place] [address] )     Migraine     Renal disorder     Seizures ([place] [address] )     Stroke [facility])        Past Surgical History  Past Surgical History:   Procedure Laterality Date    APPENDECTOMY      ARTERIOGRAM N/A 12/7/2018    Procedure: ARTERIOGRAM WITH STENT PLACEMENT;  Surgeon: Brannon Reddy MD;  [facility];  Service: Vascular    BLADDER TUMOR EXCISION     [address]  clip right frontal lobe    CARDIAC SURGERY      COLONOSCOPY      CORONARY STENT PLACEMENT      5 stents    EYE SURGERY      IR ABDOMINAL ANGIOGRAPHY / INTERVENTION  12/7/2018    MANDIBLE FRACTURE SURGERY      TONSILLECTOMY      TUBAL LIGATION      UTERINE FIBROID SURGERY            12/12/18 1143   Pain Assessment   Pain Assessment 0-10   Pain Score 4   Pain Type Acute pain   Pain Location Leg   Pain Orientation Right   Restrictions/Precautions   Weight Bearing Precautions Per Order No   Other Precautions Cognitive; Chair Alarm; Bed Alarm;Telemetry; Fall Risk;O2;Pain   General   Chart Reviewed Yes   Response to Previous Treatment Patient with no complaints from previous session  Family/Caregiver Present No   Cognition   Overall Cognitive Status Impaired   Arousal/Participation Alert   Attention Attends with cues to redirect   Orientation Level Oriented to person;Oriented to place; Disoriented to time;Disoriented to situation   Memory Decreased long term memory;Decreased recall of biographical information;Decreased short term memory;Decreased recall of recent events;Decreased recall of precautions   Following Commands Follows one step commands with increased time or repetition   Bed Mobility   Additional Comments Seated in bedside chair on arrival   Transfers   Sit to Stand 5  Supervision   Stand to Sit 5  Supervision   Stand pivot 4  Minimal assistance   Additional items Assist x 1  (RW)   Ambulation/Elevation   Gait pattern Improper Weight shift;Decreased R stance; Antalgic  (Increased lateral sway)   Gait Assistance 4  Minimal assist   Additional items Verbal cues; Tactile cues  (VC for body position in RW)   Assistive Device Rolling walker   Distance 180'   Balance   Static Sitting Fair +   Dynamic Sitting Fair +   Static Standing Fair   Dynamic Standing Fair   Ambulatory Fair -   Endurance Deficit   Endurance Deficit No   Activity Tolerance   Activity Tolerance Patient tolerated treatment well;Patient limited by pain  (Increased time with therex due to R leg pain)   Nurse Made Aware Yes, RN cleared pt for PT session   Exercises   Hamstring Sets 10 reps;Bilateral;Sitting  (manually resisted)   Knee AROM Long Arc Quad 10 reps;Bilateral;Sitting  (manually resisted)   Ankle Pumps 10 reps;Bilateral;Standing  (Standing heel raises at RW)   Marching 10 reps;Bilateral;Standing  (at RW)   Assessment   Prognosis Good   Problem List Decreased endurance; Impaired balance;Decreased mobility; Impaired judgement;Decreased cognition;Decreased safety awareness;Pain;Decreased strength   Assessment Pt was agreeable to PT treatment session  She continues to report pain in the R calf, but reports that it is significantly better  Pt tolerated ambulating 180' with slight fatigue and SOB  She required VC to stay within the RW and would often drift to the left outside of the walker  Pt continues to benefit from skilled PT to improve strength, endurance, balance, and mobility to return to PLOF  Barriers to Discharge Inaccessible home environment   Barriers to Discharge Comments 10 ERON   Goals   Patient Goals Would like to walk   [facility] Expiration Date 12/18/18   Short Term Goal #1 1  Pt will perform all bed mobility at an independent level to facilitate OOB mobility 2  Pt will perform all functional transfers at a mod I level using RW vs rollator (per dtr pt typically refuses RW at home and will only use rollator) to facilitate increased safety upright mobility 3  Pt will ambulate 150 ft with RW vs rollator at a supervision level to allow for increased independence with mobility in personal care home 4  Pt will improve dynamic standing balance to Fair+/Good to decrease fall risk 5  Pt will negotiate 10 steps with supervision using LRAD and/or handrail PRN to allow pt to access her home environment safely   Treatment Day 3   Plan   Treatment/Interventions Functional transfer training;LE strengthening/ROM; Elevations; Therapeutic exercise; Endurance training;Patient/family training;Equipment eval/education; Bed mobility;Gait training;Spoke to nursing   Progress Progressing toward goals   PT Frequency (4-6x/wk)   Recommendation   Recommendation (STR vs HHPT pending stairs)   Equipment Recommended Donato   PT - OK to Discharge Yes   Additional Comments When medically stable         Davis Contras, PT, DPT

## 2018-12-12 NOTE — PLAN OF CARE
Problem: PHYSICAL THERAPY ADULT  Goal: Performs mobility at highest level of function for planned discharge setting  See evaluation for individualized goals  Treatment/Interventions: Functional transfer training, LE strengthening/ROM, Elevations, Therapeutic exercise, Endurance training, Cognitive reorientation, Patient/family training, Bed mobility, Equipment eval/education, Gait training, Spoke to nursing  Equipment Recommended:  (use of RW at this time)       See flowsheet documentation for full assessment, interventions and recommendations  Prognosis: Good  Problem List: Decreased endurance, Impaired balance, Decreased mobility, Impaired judgement, Decreased cognition, Decreased safety awareness, Pain, Decreased strength  Assessment: Pt was agreeable to PT treatment session  She continues to report pain in the R calf, but reports that it is significantly better  Pt tolerated ambulating 180' with slight fatigue and SOB  She required VC to stay within the RW and would often drift to the left outside of the walker  Pt continues to benefit from skilled PT to improve strength, endurance, balance, and mobility to return to PLOF  Barriers to Discharge: Inaccessible home environment  Barriers to Discharge Comments: 10 ERON  Recommendation:  (STR vs HHPT pending stairs)     PT - OK to Discharge: Yes    See flowsheet documentation for full assessment

## 2018-12-12 NOTE — RESTORATIVE TECHNICIAN NOTE
Restorative Specialist Mobility Note       Activity: Chair, Dangle, Stand at bedside, Turn, Ambulate in room, Ambulate in joshua     Assistive Device: Front wheel walker     Ambulation Response: Tolerated fairly well  Repositioned: Sitting, Up in chair, Other (Comment) (chair alarm on)              Anti-Embolism Device On:  Bilateral, Sequential compression devices, below knee

## 2018-12-12 NOTE — PROGRESS NOTES
Pt converted to NSR following a few bursts of rapid irregular rhythm  NSR 80-82   14:30 Dr Espinoza Cease at bedside  Awaiting order for ECG  Made pca aware

## 2018-12-12 NOTE — CONSULTS
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 accident (CVA)    Kaity Blanco is a 68 y o  female with medical history of coronary artery disease, peripheral artery disease, status post left SFA angioplasty, hypertension, hyperlipidemia, chronic kidney disease, atrial fibrillation, seizures, depression, and dementia presents from [address] [place] [address] for vascular intervention  Patient needs to go to inpatient rehabilitation any psychiatric clearance patient is very pleasant but she does not know why she is in the hospital for, she states that her mood is stable, she denies any issue with sleep or appetite  Patient does not have any hallucinations, she denies suicidal thoughts plans or intent  Psychiatric Review Of Systems:  sleep: no  appetite changes: no  weight changes: no  energy/anergy: no  interest/pleasure/anhedonia: no  somatic symptoms: no  anxiety/panic: no  rizwan: no  guilty/hopeless: no  self injurious behavior/risky behavior: no    Historical Information   Past Psychiatric History:   Patient had depression and dementia  She had two recent inpatient psychiatric units in [facility] in 2017 and again in 2018  Currently in treatment with she has a psychiatrist in the community  Past Suicide attempts: none   Past Violent behavior: none  Past Psychiatric medication trial: Wellbutrin, tegretol,depakote      Substance Abuse History:  No drugs or alcohol history     I have assessed this patient for substance use within the past 12 months     History of IP/OP rehabilitation program:  None  Smoking history:   Former smoker  Family Psychiatric History:   None    Social History  Education: high school diploma/GED  Learning Disabilities: None  Marital history:   Living arrangement, social support: [facility]  Occupational History: retired  Functioning Relationships: good support system    Other Pertinent History: None    Traumatic History:   Abuse: None  Other Traumatic Events: None    Past Medical History:   Diagnosis Date    Atrial fibrillation ([facility] )     Brain aneurysm     CAD (coronary artery disease)     Cardiac disease     had stents 12 years ago in [facility]    CHF (congestive heart failure) ([facility]     CKD (chronic kidney disease)     COPD (chronic obstructive pulmonary disease) ([facility])     Dementia     Dementia     Disease of thyroid gland     GERD (gastroesophageal reflux disease)     Hyperlipidemia     Hyperlipidemia     Hypertension     MI (myocardial infarction) ([place] [address] )     Migraine     Renal disorder     Seizures ([place] [address] )     Stroke [facility])        Medical Review Of Systems:  Review of Systems - Negative except shortness of breath, difficulty ambulating, cognitive deficiencies all other systems reviewed were negative    Meds/Allergies   current meds:   Current Facility-Administered Medications   Medication Dose Route Frequency    albuterol (PROVENTIL HFA,VENTOLIN HFA) inhaler 2 puff  2 puff Inhalation Q6H PRN    albuterol inhalation solution 2 5 mg  2 5 mg Nebulization Q6H PRN    allopurinol (ZYLOPRIM) tablet 300 mg  300 mg Oral QAM    atorvastatin (LIPITOR) tablet 10 mg  10 mg Oral Daily    buPROPion (WELLBUTRIN SR) 12 hr tablet 150 mg  150 mg Oral Daily    carBAMazepine (TEGretol) chewable tablet 100 mg  100 mg Oral TID    clopidogrel (PLAVIX) tablet 75 mg  75 mg Oral Daily    diltiazem (CARDIZEM SR) 12 hr capsule 180 mg  180 mg Oral Q12H TONI    divalproex sodium (DEPAKOTE ER) 24 hr tablet 250 mg  250 mg Oral Daily    fenofibrate (TRICOR) tablet 48 mg  48 mg Oral Daily    fluticasone-vilanterol (BREO ELLIPTA) 100-25 mcg/inh inhaler 1 puff  1 puff Inhalation Daily    furosemide (LASIX) tablet 40 mg  40 mg Oral Daily    levothyroxine tablet 88 mcg  88 mcg Oral Early Morning    metoprolol tartrate (LOPRESSOR) tablet 50 mg  50 mg Oral Q12H TONI    ondansetron (ZOFRAN) injection 4 mg  4 mg Intravenous Q6H PRN    senna-docusate sodium (SENOKOT S) 8 6-50 mg per tablet 1 tablet  1 tablet Oral Daily PRN    warfarin (COUMADIN) tablet 5 mg  5 mg Oral Daily (warfarin)     Allergies   Allergen Reactions    Spiriva Handihaler [Tiotropium Bromide Monohydrate] Swelling    Ramipril Facial Swelling    Penicillins      unsure       Objective   Vital signs in last 24 hours:  Temp:  [98 3 °F (36 8 °C)-99 2 °F (37 3 °C)] 98 3 °F (36 8 °C)  HR:  [55-70] 61  Resp:  [16-18] 18  BP: (129-151)/(60-69) 151/68      Intake/Output Summary (Last 24 hours) at 12/12/18 1349  Last data filed at 12/12/18 1234   Gross per 24 hour   Intake              300 ml   Output             1200 ml   Net             -900 ml       Mental Status Evaluation:  Appearance:  age appropriate   Behavior:  normal   Speech:  normal pitch and normal volume   Mood:  euthymic   Affect:  mood-congruent   Language: naming objects and repeating phrases   Thought Process:  goal directed   Associations: intact associations   Thought Content:  normal   Perceptual Disturbances: None   Risk Potential: Denies suicidal thoughts plans or intent   Sensorium:  person and place   Memory:  recent memory impaired, remote memory impaired   Cognition:  Impaired   Consciousness:  alert and awake    Attention: attention span appeared shorter than expected for age   Intellect: normal   Fund of Knowledge: awareness of current events:  fair, past history: Limited and vocabulary: Limited   Insight:  fair   Judgment: fair   Muscle Strength and Tone: Within normal limits   Gait/Station: Difficulty ambulating   Motor Activity: no abnormal movements     Lab Results:    Lab Results   Component Value Date    WBC 6 00 12/12/2018    HGB 8 7 (L) 12/12/2018    HCT 27 7 (L) 12/12/2018     (H) 12/12/2018     (L) 12/12/2018     Lab Results   Component Value Date    K 3 5 12/12/2018     12/12/2018    CO2 28 12/12/2018    BUN 23 12/12/2018    CREATININE 1 28 12/12/2018    GLUF 122 (H) 10/05/2018    CALCIUM 9 8 12/12/2018    AST 28 06/16/2018    ALT 44 06/16/2018    ALKPHOS 68 06/16/2018    EGFR 42 12/12/2018         Code Status: )Level 1 - Full Code    Assessment/Plan     Assessment:  Zbigniew Saini is a 68 y o  female with multiple medical problems, history of depression with recent admission to a psychiatric unit  Needs to go to inpatient rehabilitation and needs psychiatric clearance  At this moment patient is at baseline  Patient does not have any psychotic symptoms she denies suicidal thoughts plans or intent  Diagnosis:   Major depressive disorder mild  Mood Disorder as late effect of CVA, ruptured aneurysm    Plan:   Continue medical management  Continue current psychotropic medication  Patient is psychiatrically clear  I will sign off  Risks, benefits and possible side effects of Medications:   Risks, benefits, and possible side effects of medications explained to patient and patient verbalizes understanding             Denilson Velez MD

## 2018-12-12 NOTE — ASSESSMENT & PLAN NOTE
Physical therapy recommends rehab   is aware  As per  discussion with patient's daughter, patient had to be year old healthy unit admissions last year  Will consult Psychiatry to help regarding this for SNF placement

## 2018-12-12 NOTE — PROGRESS NOTES
Progress Note Leandra Ramirez [birth year], 68 y o  female MRN: [id number]    Unit/Bed#: [facility] 529-01 Encounter: [id number]    Primary Care Provider: Mikayla Serna DO   Date and time admitted to hospital: 12/7/2018  3:03 PM        * Peripheral artery disease ([facility] 75 )   Assessment & Plan    s/p angiogram showing initial stent fracture, placement of B TEMO stents, R EIA distal stent extension for dissection     Repeat arterial Dopplers were obtained given right lower extremity edema which shows improvement in bilateral ANABEL  Venous duplex: 12/9  neg for DVT  ANABEL 12/9: ANABEL: R0 77, L 0 99  PLAN:  Continue with Plavix and statin  Continue anticoagulation with Coumadin  INR therapeutic tpay, will decrease coumadin to 5 mg daily  Trend INR>  Appreciate vascular surgery input  Outpatient vascular surgery follow-up  Macrocytic anemia   Assessment & Plan    Hemoglobin now stable between 8-10  without any signs of active bleeding  Trend CBC  Noted normal folic acid, vitamin N82 and TSH  Stage 3 chronic kidney disease ([facility] 75 )   Assessment & Plan    Creatinine at baseline  Renal function stable  Restart home dose lasix  Ambulatory dysfunction   Assessment & Plan    Physical therapy recommends rehab   is aware  As per  discussion with patient's daughter, patient had to be year old healthy unit admissions last year  Will consult Psychiatry to help regarding this for SNF placement  Seizure disorder [facility])   Assessment & Plan    Continue with home dose carbamazepine and Depakote  Hyperlipidemia   Assessment & Plan    Continue statin     Atrial flutter (HCC)   Assessment & Plan    Rate control on current dose of Cardizem and metoprolol  On Coumadin for anticoagulation with therapeutic INR  Will give daily 5 mg Coumadin  Trend INR and re-dose Coumadin accordingly       Chronic respiratory failure (HCC)   Assessment & Plan    Stable  O2 supplementation as needed to maintain O2 sats between 88-92 %  Incentive spirometry     COPD (chronic obstructive pulmonary disease) (HCC)   Assessment & Plan    Not in exacerbation  Continue with current dose of Breo Ellipta, albuterol  Has oxygen at home which she uses intermittently  HTN (hypertension)   Assessment & Plan    Continue with 180 mg daily Cardizem and 50 mg b i d  Lopressor  Restarted 40 mg Lasix which she is on at home  VTE Pharmacologic Prophylaxis:   Pharmacologic: Enoxaparin (Lovenox)  Mechanical VTE Prophylaxis in Place: Yes    Patient Centered Rounds: I have performed bedside rounds with nursing staff today  Discussions with Specialists or Other Care Team Provider:      Education and Discussions with Family / Patient:  Discussed plan of care with the patient and her daughter Loretta Carter on phone  Time Spent for Care: 30 minutes  More than 50% of total time spent on counseling and coordination of care as described above  Current Length of Stay: 5 day(s)    Current Patient Status: Inpatient   Certification Statement: The patient will continue to require additional inpatient hospital stay due to Pending placement    Discharge Plan:  Pending placement    Code Status: Level 1 - Full Code      Subjective:   No significant overnight events  Patient is comfortable this morning and denies any acute pain or discomfort  Spoke with daughter Ayaka Ramirez on phone who was upset as she was told that patient was walked only once last night and she feels that patient should be ambulating much more than she is right now in the hospital   Spoke with nursing staff regarding this and patient has been ambulating better today  Objective:     Vitals:   Temp (24hrs), Av 8 °F (37 1 °C), Min:98 3 °F (36 8 °C), Max:99 2 °F (37 3 °C)    Temp:  [98 3 °F (36 8 °C)-99 2 °F (37 3 °C)] 98 3 °F (36 8 °C)  HR:  [55-70] 61  Resp:  [16-18] 18  BP: (129-151)/(60-69) 151/68  SpO2:  [94 %-95 %] 94 %  Body mass index is 36 08 kg/m²  Input and Output Summary (last 24 hours): Intake/Output Summary (Last 24 hours) at 12/12/18 1426  Last data filed at 12/12/18 1234   Gross per 24 hour   Intake              300 ml   Output             1200 ml   Net             -900 ml       Physical Exam:     Physical Exam  Constitutional: She is oriented to person, place, and time  No distress  Eyes: Pupils are equal, round, and reactive to light  Cardiovascular: Normal rate, regular rhythm, normal heart sounds and intact distal pulses     No murmur heard  Pulmonary/Chest: Breath sounds normal  No respiratory distress  She has no wheezes  She has no rales  Abdominal: Soft  Bowel sounds are normal  She exhibits no distension  There is no tenderness  Musculoskeletal: no LE edema      Neurological: She is alert and oriented to person, place, and time  No focal motor deficits   Skin: Skin is warm         Additional Data:     Labs:      Results from last 7 days  Lab Units 12/12/18  0502   WBC Thousand/uL 6 00   HEMOGLOBIN g/dL 8 7*   HEMATOCRIT % 27 7*   PLATELETS Thousands/uL 133*   NEUTROS PCT % 54   LYMPHS PCT % 27   MONOS PCT % 14*   EOS PCT % 4       Results from last 7 days  Lab Units 12/12/18  0502   SODIUM mmol/L 142   POTASSIUM mmol/L 3 5   CHLORIDE mmol/L 107   CO2 mmol/L 28   BUN mg/dL 23   CREATININE mg/dL 1 28   ANION GAP mmol/L 7   CALCIUM mg/dL 9 8   GLUCOSE RANDOM mg/dL 116       Results from last 7 days  Lab Units 12/12/18  1218   INR  2 28*       Results from last 7 days  Lab Units 12/08/18  2033   POC GLUCOSE mg/dl 120                   * I Have Reviewed All Lab Data Listed Above  * Additional Pertinent Lab Tests Reviewed:  All Labs Within Last 24 Hours Reviewed    Imaging:    VAS lower limb arterial duplex, complete bilateral   Final Result by Sae Reddy MD (12/10 3450)      VAS lower limb venous duplex study, complete bilateral   Final Result by Naomi Fulton MD (12/09 1420)      IR abdominal angiography / intervention   Final Result by Qi Avina MD (12/08 2006)   Impression:    Successful treatment of fractured right common and external iliac stent with coexisting distal abdominal aortic dissection, bilateral common iliac artery dissections, and right external iliac artery dissection with placement of " kissing " common iliac    Viabahn stent grafts extending into the lower abdominal aorta with successful exclusion of the fractured stent from the circulation as described,  and significantly less contrast filling the aortic and common iliac dissections  The right external iliac    stent was extended down to the inguinal ligament to treat the external iliac dissection  A good result was obtained, as described above        Workstation performed: [id number]             Recent Cultures (last 7 days):           Last 24 Hours Medication List:     Current Facility-Administered Medications:  albuterol 2 puff Inhalation Q6H PRN Bonnie Reddy MD   albuterol 2 5 mg Nebulization Q6H PRN Anil Mendez MD   allopurinol 300 mg Oral Ranulfolee Face Doctor, MD   atorvastatin 10 mg Oral Daily Anil Mendez MD   buPROPion 150 mg Oral Daily Anil Mendez MD   carBAMazepine 100 mg Oral TID Bonnie Reddy MD   clopidogrel 75 mg Oral Daily Anil Mendez MD   diltiazem 180 mg Oral Q12H [address] [facility], MD   divalproex sodium 250 mg Oral Daily Anil Mendez MD   fenofibrate 48 mg Oral Daily Anil Mendez MD   fluticasone-vilanterol 1 puff Inhalation Daily Anil Mendez MD   furosemide 40 mg Oral Daily Luisa Leyva MD   levothyroxine 88 mcg Oral Early Morning Anil Mendez MD   metoprolol tartrate 50 mg Oral Q12H [address] [facility], [place]   ondansetron 4 mg Intravenous Q6H PRN Anil Mendez MD   senna-docusate sodium 1 tablet Oral Daily PRN Anil Mendez MD   warfarin 5 mg Oral Daily (warfarin) Luisa Leyva MD        Today, Patient Was Seen By: Luisa Leyva MD    ** Please Note: Dictation voice to text software may have been used in the creation of this document   **

## 2018-12-12 NOTE — TELEPHONE ENCOUNTER
Pt's daughter called and stated she missed a call from Dr Vickie Gonzalez and would like him to please give her a call back

## 2018-12-12 NOTE — ASSESSMENT & PLAN NOTE
Rate control on current dose of Cardizem and metoprolol  On Coumadin for anticoagulation with therapeutic INR  Will give daily 5 mg Coumadin  Trend INR and re-dose Coumadin accordingly

## 2018-12-13 LAB
ANION GAP SERPL CALCULATED.3IONS-SCNC: 7 MMOL/L (ref 4–13)
BUN SERPL-MCNC: 30 MG/DL (ref 5–25)
CALCIUM SERPL-MCNC: 9.5 MG/DL (ref 8.3–10.1)
CHLORIDE SERPL-SCNC: 109 MMOL/L (ref 100–108)
CO2 SERPL-SCNC: 31 MMOL/L (ref 21–32)
CREAT SERPL-MCNC: 1.38 MG/DL (ref 0.6–1.3)
GFR SERPL CREATININE-BSD FRML MDRD: 38 ML/MIN/1.73SQ M
GLUCOSE SERPL-MCNC: 102 MG/DL (ref 65–140)
INR PPP: 2.64 (ref 0.86–1.17)
POTASSIUM SERPL-SCNC: 4 MMOL/L (ref 3.5–5.3)
PROTHROMBIN TIME: 28.2 SECONDS (ref 11.8–14.2)
SODIUM SERPL-SCNC: 147 MMOL/L (ref 136–145)

## 2018-12-13 PROCEDURE — 99232 SBSQ HOSP IP/OBS MODERATE 35: CPT | Performed by: INTERNAL MEDICINE

## 2018-12-13 PROCEDURE — 80048 BASIC METABOLIC PNL TOTAL CA: CPT | Performed by: INTERNAL MEDICINE

## 2018-12-13 PROCEDURE — 97530 THERAPEUTIC ACTIVITIES: CPT

## 2018-12-13 PROCEDURE — 85610 PROTHROMBIN TIME: CPT | Performed by: INTERNAL MEDICINE

## 2018-12-13 PROCEDURE — 94760 N-INVAS EAR/PLS OXIMETRY 1: CPT

## 2018-12-13 PROCEDURE — 97116 GAIT TRAINING THERAPY: CPT

## 2018-12-13 RX ADMIN — DILTIAZEM HYDROCHLORIDE 180 MG: 90 CAPSULE, EXTENDED RELEASE ORAL at 22:00

## 2018-12-13 RX ADMIN — WARFARIN SODIUM 5 MG: 5 TABLET ORAL at 17:30

## 2018-12-13 RX ADMIN — CARBAMAZEPINE 100 MG: 100 TABLET, CHEWABLE ORAL at 22:00

## 2018-12-13 RX ADMIN — ALBUTEROL SULFATE 2 PUFF: 90 AEROSOL, METERED RESPIRATORY (INHALATION) at 10:09

## 2018-12-13 RX ADMIN — ATORVASTATIN CALCIUM 10 MG: 10 TABLET, FILM COATED ORAL at 10:00

## 2018-12-13 RX ADMIN — LEVOTHYROXINE SODIUM 88 MCG: 88 TABLET ORAL at 05:03

## 2018-12-13 RX ADMIN — METOPROLOL TARTRATE 50 MG: 50 TABLET, FILM COATED ORAL at 10:00

## 2018-12-13 RX ADMIN — BUPROPION HYDROCHLORIDE 150 MG: 150 TABLET, EXTENDED RELEASE ORAL at 10:00

## 2018-12-13 RX ADMIN — CARBAMAZEPINE 100 MG: 100 TABLET, CHEWABLE ORAL at 17:30

## 2018-12-13 RX ADMIN — ALLOPURINOL 300 MG: 300 TABLET ORAL at 10:00

## 2018-12-13 RX ADMIN — DILTIAZEM HYDROCHLORIDE 180 MG: 90 CAPSULE, EXTENDED RELEASE ORAL at 10:00

## 2018-12-13 RX ADMIN — DIVALPROEX SODIUM 250 MG: 250 TABLET, EXTENDED RELEASE ORAL at 10:00

## 2018-12-13 RX ADMIN — CLOPIDOGREL BISULFATE 75 MG: 75 TABLET ORAL at 10:00

## 2018-12-13 RX ADMIN — FENOFIBRATE 48 MG: 48 TABLET ORAL at 10:00

## 2018-12-13 RX ADMIN — CARBAMAZEPINE 100 MG: 100 TABLET, CHEWABLE ORAL at 10:00

## 2018-12-13 RX ADMIN — METOPROLOL TARTRATE 50 MG: 50 TABLET, FILM COATED ORAL at 22:10

## 2018-12-13 RX ADMIN — FLUTICASONE FUROATE AND VILANTEROL TRIFENATATE 1 PUFF: 100; 25 POWDER RESPIRATORY (INHALATION) at 10:00

## 2018-12-13 NOTE — SOCIAL WORK
Brittny Escobar from [address] was here for OPTIONs assessment and will send paperwork to [place] [id number] office for review  Met with patient and daughter to explain we are still working on OPTIONs assessment  Daughter has been walking patient in hallway and states she feels patient can return to [facility]  When daughter was questioned about need for patient to do flight of steps before return she expressed frustration that patient has not been getting more therapy here  Explanation provided of limitations of therapy in acute care setting  Told daughter that CM will fax therapy notes to [place] for review  Attempted to contact staff at [place] for fax # and left message on general VM and nurses office  Discussed patient with PT/OT

## 2018-12-13 NOTE — ASSESSMENT & PLAN NOTE
Hemoglobin now stable between 8-10  without any signs of active bleeding  Trend CBC  Noted normal folic acid, vitamin A92 and TSH

## 2018-12-13 NOTE — ASSESSMENT & PLAN NOTE
s/p angiogram showing initial stent fracture, placement of B TEMO stents, R EIA distal stent extension for dissection     Repeat arterial Dopplers were obtained given right lower extremity edema which shows improvement in bilateral ANABEL  Venous duplex: 12/9  neg for DVT  ANABEL 12/9: ANABEL: R0 77, L 0 99  PLAN:  Continue with Plavix and statin  Continue anticoagulation with Coumadin  INR therapeutic , will continue with 5 mg Coumadin  Trend INR  Appreciate vascular surgery input  Outpatient vascular surgery follow-up

## 2018-12-13 NOTE — PHYSICAL THERAPY NOTE
PHYSICAL THERAPY TREATMENT       12/13/18 1431   Pain Assessment   Pain Assessment No/denies pain   Restrictions/Precautions   Weight Bearing Precautions Per Order No   Other Precautions Cognitive; Fall Risk;O2;Bed Alarm; Chair Alarm   General   Chart Reviewed Yes   Additional Pertinent History dtr present, provides additional info regarding setup and assist at [place]   Family/Caregiver Present Yes   Cognition   Overall Cognitive Status Impaired   Arousal/Participation Cooperative; Alert  (looks to dtr for approval during therapy session, pt nervous)   Attention Attends with cues to redirect   Orientation Level Oriented to person;Oriented to place   Memory Decreased short term memory;Decreased recall of recent events;Decreased recall of precautions   Following Commands Follows one step commands with increased time or repetition   Subjective   Subjective pt is agreeable to therapy session    Bed Mobility   Additional Comments pt received seated in bedside chair   Transfers   Sit to Stand 5  Supervision   Additional items Other;Assist x 1;Verbal cues  (stands to RW, cues for hand placement but pulls on RW still)   Stand to Sit 4  Minimal assistance   Additional items Assist x 1;Verbal cues  (CGA)   Stand pivot 5  Supervision   Additional items Assist x 1; Increased time required;Verbal cues  (CGA with RW)   Ambulation/Elevation   Gait pattern Wide JENNYFER; Decreased R stance; Inconsistent randi; Foward flexed  (keeps RW too far forward)   Gait Assistance 4  Minimal assist   Additional items Assist x 1;Verbal cues; Other (Comment)  (cues for RW mgmt; dtr reports that pt usually forgets her RW)   Assistive Device Rolling walker  (pt walks without AD at baseline, forgets her RW)   Distance 180'   Stair Management Assistance 4  Minimal assist   Additional items Assist x 1  (CGA/Gabrielle to descend, c/o R calf cramp and "off balance")   Stair Management Technique Two rails   Number of Stairs 14   Balance   Static Sitting Fair +   Dynamic Sitting Fair +   Static Standing Fair   Dynamic Standing Fair   Ambulatory Fair  (with RW, fair (-) with HHA)   Endurance Deficit   Endurance Deficit Yes   Endurance Deficit Description SOB descending stairs, seated rest after stairs   Activity Tolerance   Activity Tolerance Patient limited by fatigue;Patient limited by pain  (R calf cramping)   Nurse Made Aware ok to see per RN   Assessment   Prognosis Good   Problem List Decreased strength;Decreased endurance; Impaired balance;Decreased mobility; Decreased cognition; Impaired judgement;Pain   Assessment Ms Tobias was seen in therapy session this afternoon  Her daughter was also present  Dtr provided info regarding home set up including 10 steps to bedroom with B rails within reach  She walks a lot during the day when she is home and mostly does not use an assistive device  She has a walker that she usually leaves up against a wall  She was walking and negotiating stairs with distant S prior to admission  Someone is always at the personal care home for assistance but not specifically for Ms Tobias alone  Ms Hany Combs requires close S-CGA with amb with RW and also walks with her dtr with HHA and increased postural sway  She does become fatigued with stair negotiation but is determined to try it again after a seated rest break and completes an additional 7 steps with 2 rails and close S-CGA of her dtr  She continues to have pain/cramping in R calf though it is getting better  This became bothersome on the stairs and with walking  This patient is at a high risk for falls and it is recommended that she transition to inpatient rehab or go home with full time care for safety, balance, and mobility       Barriers to Discharge Inaccessible home environment;Decreased caregiver support  (distant S at personal care home)   Barriers to Discharge Comments decreased balance and cognition put patient at a high fall risk   Goals   Patient Goals to go home   STG Expiration Date 12/18/18   Short Term Goal #1 1  Pt will perform all bed mobility at an independent level to facilitate OOB mobility 2  Pt will perform all functional transfers at a mod I level using RW vs rollator (per dtr pt typically refuses RW at home and will only use rollator) to facilitate increased safety upright mobility 3  Pt will ambulate 150 ft with RW vs rollator at a supervision level to allow for increased independence with mobility in personal care home 4  Pt will improve dynamic standing balance to Fair+/Good to decrease fall risk 5  Pt will negotiate 10 steps with supervision using LRAD and/or handrail PRN to allow pt to access her home environment safely   Treatment Day 4   Plan   Treatment/Interventions Functional transfer training;LE strengthening/ROM; Elevations; Therapeutic exercise; Endurance training;Cognitive reorientation;Patient/family training;Gait training   Progress Progressing toward goals   PT Frequency (4-6x/wk)   Recommendation   Recommendation (STR vs personal care home with increased assist and home PT)   Equipment Recommended Walker   PT - OK to Discharge Yes  (when medically appropriate)   Additional Comments The patient's daughter is openly frustrated that this patient has not been more mobile during her acute stay despite her medical complications         Neida Xie, PT

## 2018-12-13 NOTE — ASSESSMENT & PLAN NOTE
Physical therapy recommends rehab   is aware  As per  discussion with patient's daughter, patient had to be year old healthy unit admissions last year  Appreciate psychiatry evaluation

## 2018-12-13 NOTE — OCCUPATIONAL THERAPY NOTE
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    JESENIA (obstructive sleep apnea)    Intermittent claudication of right lower extremity due to atherosclerosis (HCC)    Atrial fibrillation (HCC)    SOB (shortness of breath)    Chronic indwelling Davis catheter    Bradycardia    Chronic hypoxemic respiratory failure ([facility] )    Essential hypertension    Peripheral artery disease ([facility] )    Hematoma    Contrast dye induced nephropathy    Macrocytic anemia           12/13/18 1530   Restrictions/Precautions   Weight Bearing Precautions Per Order No   Other Precautions Cognitive; Chair Alarm;Telemetry;O2;Fall Risk   Pain Assessment   Pain Assessment No/denies pain   Pain Score No Pain   Bed Mobility   Additional Comments pt seated in chair upon therapist entry   Transfers   Sit to Stand 5  Supervision   Additional items Assist x 1; Increased time required;Verbal cues   Stand to Sit 4  Minimal assistance   Additional items Assist x 1; Increased time required;Verbal cues   Stand pivot 5  Supervision   Additional items Assist x 1; Increased time required;Verbal cues   Cognition   Overall Cognitive Status Impaired   Arousal/Participation Cooperative; Alert   Attention Attends with cues to redirect   Orientation Level Oriented to person;Oriented to place   Memory Decreased short term memory;Decreased recall of recent events;Decreased recall of precautions   Following Commands Follows one step commands with increased time or repetition   Activity Tolerance   Activity Tolerance (Limited by anxiety)   Medical Staff Made Aware PT Debbie   Assessment   Assessment Pt is seen for skilled occupational therapy session 12/13/18 including interventions to: increase endurance, increase activity tolerance, improve functional mobility and cognitive reorientation  Pt transfers from STS with supervision and stand to sit with Gabrielle  Pt req assistance to manage lines and for steadying while ambulating around room and around unit  Pt demonstrating anxiety throughout session  Pt's daughter present, and a large portion of treatment session focused on discussing pt's PLOF, as well as pt's daughter's concerns  Pt's daughter reports that her mother required assistance with ADLs due to forgetting what steps she had already completed, but did not need physical assistance  Pt required assistance with IADLs PTA  Pt has to go up and down 10 steps in order to go to the recreation and eating area at the personal care home  Discussed with pt and her daughter that based on OT assessment, pt would require increased assistance at the facility if she is going to return  Pt's daughter reports that "someone is always with her", referring to 2 roommates at the personal care home  Pt's daughter reports that they are not able to provide increased assistance at her current facility  Pt's daughter reports she is unhappy with the amount of walking that the staff has done with the patient, and understands that her mother will now need either rehab or increased support upon d/c  Pt continues to function below her baseline level, and is to continue to benefit from skilled occupational therapy while in the hospital to maximize functioning and independence in daily activities  Plan   Treatment Interventions Functional transfer training;UE strengthening/ROM; Endurance training;Cognitive reorientation;Patient/family training;Continued evaluation; Activityengagement; Energy conservation   OT Frequency 3-5x/wk   Recommendation   OT Discharge Recommendation (STR vs home with increased support)   OT - OK to Discharge (when medically stable)   Barthel Index   Feeding 10   Bathing 0   Grooming Score 5   Dressing Score 5   Bladder Score 10   Bowels Score 10   Toilet Use Score 5   Transfers (Bed/Chair) Score 10   Mobility (Level Surface) Score 10   Stairs Score 5   Barthel Index Score 70     Misty Lewis, MOT, OTR/L

## 2018-12-13 NOTE — RESTORATIVE TECHNICIAN NOTE
Restorative Specialist Mobility Note       Activity: Chair, Dangle, Stand at bedside, Ambulate in room, Ambulate in joshua     Assistive Device: Front wheel walker     Ambulation Response: Tolerated fairly well  Repositioned: Sitting, Up in chair, Other (Comment) (chair alarm on)              Anti-Embolism Device On:  Bilateral, Sequential compression devices, below knee

## 2018-12-13 NOTE — ASSESSMENT & PLAN NOTE
Baseline creatinine seems to be between 1 2-1 6 on review of previous records, although creatinine has been stable around 1 1 during this hospitalization  Creatinine trended up to 1 38 this morning  Hold home dose Lasix  Trend BMP

## 2018-12-13 NOTE — PLAN OF CARE
Problem: OCCUPATIONAL THERAPY ADULT  Goal: Performs self-care activities at highest level of function for planned discharge setting  See evaluation for individualized goals  Treatment Interventions: Functional transfer training, UE strengthening/ROM, Endurance training, Cognitive reorientation, Patient/family training, Continued evaluation, Activityengagement, Energy conservation          See flowsheet documentation for full assessment, interventions and recommendations  Outcome: Progressing  Limitation: Decreased ADL status, Decreased UE strength, Decreased Safe judgement during ADL, Decreased cognition, Decreased endurance, Decreased self-care trans, Decreased high-level ADLs  Prognosis: Poor  Assessment: Pt is seen for skilled occupational therapy session 12/13/18 including interventions to: increase endurance, increase activity tolerance, improve functional mobility and cognitive reorientation  Pt transfers from STS with supervision and stand to sit with Gabrielle  Pt req assistance to manage lines and for steadying while ambulating around room and around unit  Pt demonstrating anxiety throughout session  Pt's daughter present, and a large portion of treatment session focused on discussing pt's PLOF, as well as pt's daughter's concerns  Pt's daughter reports that her mother required assistance with ADLs due to forgetting what steps she had already completed, but did not need physical assistance  Pt required assistance with IADLs PTA  Pt has to go up and down 10 steps in order to go to the recreation and eating area at the personal care home  Discussed with pt and her daughter that based on OT assessment, pt would require increased assistance at the facility if she is going to return  Pt's daughter reports that "someone is always with her", referring to 2 roommates at the personal care home  Pt's daughter reports that they are not able to provide increased assistance at her current facility    Pt's daughter reports she is unhappy with the amount of walking that the staff has done with the patient, and understands that her mother will now need either rehab or increased support upon d/c  Pt continues to function below her baseline level, and is to continue to benefit from skilled occupational therapy while in the hospital to maximize functioning and independence in daily activities       OT Discharge Recommendation:  (STR vs home with increased support)  OT - OK to Discharge:  (when medically stable)      Comments: NATHANAEL Alicia, OTR/L

## 2018-12-13 NOTE — PROGRESS NOTES
Progress Note Michelle Peralta [birth year], 68 y o  female MRN: [id number]    Unit/Bed#: [facility] 529-01 Encounter: [id number]    Primary Care Provider: Harper Miles DO   Date and time admitted to hospital: 12/7/2018  3:03 PM        * Peripheral artery disease ([address] )   Assessment & Plan    s/p angiogram showing initial stent fracture, placement of B TEMO stents, R EIA distal stent extension for dissection     Repeat arterial Dopplers were obtained given right lower extremity edema which shows improvement in bilateral ANABEL  Venous duplex: 12/9  neg for DVT  ANABEL 12/9: ANABEL: R0 77, L 0 99  PLAN:  Continue with Plavix and statin  Continue anticoagulation with Coumadin  INR therapeutic , will continue with 5 mg Coumadin  Trend INR  Appreciate vascular surgery input  Outpatient vascular surgery follow-up  Macrocytic anemia   Assessment & Plan    Hemoglobin now stable between 8-10  without any signs of active bleeding  Trend CBC  Noted normal folic acid, vitamin C30 and TSH  Stage 3 chronic kidney disease (HCC)   Assessment & Plan    Baseline creatinine seems to be between 1 2-1 6 on review of previous records, although creatinine has been stable around 1 1 during this hospitalization  Creatinine trended up to 1 38 this morning  Hold home dose Lasix  Trend BMP  Ambulatory dysfunction   Assessment & Plan    Physical therapy recommends rehab   is aware  As per  discussion with patient's daughter, patient had to be year old healthy unit admissions last year  Appreciate psychiatry evaluation  Seizure disorder [facility])   Assessment & Plan    Continue with home dose carbamazepine and Depakote  Hyperlipidemia   Assessment & Plan    Continue statin     Atrial flutter (HCC)   Assessment & Plan    Rate control on current dose of Cardizem and metoprolol  On Coumadin for anticoagulation with therapeutic INR  Will give daily 5 mg Coumadin    Trend INR and re-dose Coumadin accordingly  Chronic respiratory failure (HCC)   Assessment & Plan    Stable  O2 supplementation as needed to maintain O2 sats between 88-92 %  Incentive spirometry     COPD (chronic obstructive pulmonary disease) (HCC)   Assessment & Plan    Not in exacerbation  Continue with current dose of Breo Ellipta, albuterol  Has oxygen at home which she uses intermittently  HTN (hypertension)   Assessment & Plan    Continue with 180 mg daily Cardizem and 50 mg b i d  Lopressor  Restarted 40 mg Lasix which she is on at home  VTE Pharmacologic Prophylaxis:   Pharmacologic: On warfarin  Mechanical VTE Prophylaxis in Place: Yes    Patient Centered Rounds: I have performed bedside rounds with nursing staff today  Discussions with Specialists or Other Care Team Provider:      Education and Discussions with Family / Patient:  Discussed plan of care with the patient    Time Spent for Care: 30 minutes  More than 50% of total time spent on counseling and coordination of care as described above  Current Length of Stay: 6 day(s)    Current Patient Status: Inpatient   Certification Statement: The patient will continue to require additional inpatient hospital stay due to Pending placement    Discharge Plan:  Pending placement    Code Status: Level 1 - Full Code      Subjective:   Patient feels well this morning  She denies any acute discomfort  She says that she slept well and tolerated diet well  She walked around the hallway this morning and tolerated ambulation well  Objective:     Vitals:   Temp (24hrs), Av 1 °F (36 7 °C), Min:97 6 °F (36 4 °C), Max:98 8 °F (37 1 °C)    Temp:  [97 6 °F (36 4 °C)-98 8 °F (37 1 °C)] 98 1 °F (36 7 °C)  HR:  [54-65] 54  Resp:  [18-20] 18  BP: (133-146)/(61-80) 146/66  SpO2:  [94 %-99 %] 95 %  Body mass index is 36 08 kg/m²  Input and Output Summary (last 24 hours):        Intake/Output Summary (Last 24 hours) at 18 0182  Last data filed at 12/13/18 1220   Gross per 24 hour   Intake              660 ml   Output              741 ml   Net              -81 ml       Physical Exam:     Physical Exam  Constitutional: She is oriented to person, place, and time  No distress  Eyes: Pupils are equal, round, and reactive to light  Cardiovascular: Normal rate, regular rhythm, normal heart sounds and intact distal pulses     No murmur heard  Pulmonary/Chest: Breath sounds normal  No respiratory distress  She has no wheezes  She has no rales  Abdominal: Soft  Bowel sounds are normal  She exhibits no distension  There is no tenderness  Musculoskeletal: no LE edema      Neurological: She is alert and oriented to person, place, and time  No focal motor deficits   Skin: Skin is warm  Additional Data:     Labs:      Results from last 7 days  Lab Units 12/12/18  0502   WBC Thousand/uL 6 00   HEMOGLOBIN g/dL 8 7*   HEMATOCRIT % 27 7*   PLATELETS Thousands/uL 133*   NEUTROS PCT % 54   LYMPHS PCT % 27   MONOS PCT % 14*   EOS PCT % 4       Results from last 7 days  Lab Units 12/13/18  0450   SODIUM mmol/L 147*   POTASSIUM mmol/L 4 0   CHLORIDE mmol/L 109*   CO2 mmol/L 31   BUN mg/dL 30*   CREATININE mg/dL 1 38*   ANION GAP mmol/L 7   CALCIUM mg/dL 9 5   GLUCOSE RANDOM mg/dL 102       Results from last 7 days  Lab Units 12/13/18  0450   INR  2 64*       Results from last 7 days  Lab Units 12/08/18  2033   POC GLUCOSE mg/dl 120                   * I Have Reviewed All Lab Data Listed Above  * Additional Pertinent Lab Tests Reviewed:  All Labs Within Last 24 Hours Reviewed    Imaging:    VAS lower limb arterial duplex, complete bilateral   Final Result by Sonia Reddy MD (12/10 9034)      VAS lower limb venous duplex study, complete bilateral   Final Result by Sonia Reddy MD (12/09 4098)      IR abdominal angiography / intervention   Final Result by Valery Roberson MD (12/08 2006)   Impression:    Successful treatment of fractured right common and external iliac stent with coexisting distal abdominal aortic dissection, bilateral common iliac artery dissections, and right external iliac artery dissection with placement of " kissing " common iliac    Viabahn stent grafts extending into the lower abdominal aorta with successful exclusion of the fractured stent from the circulation as described,  and significantly less contrast filling the aortic and common iliac dissections  The right external iliac    stent was extended down to the inguinal ligament to treat the external iliac dissection  A good result was obtained, as described above  Workstation performed: [id number]           Recent Cultures (last 7 days):           Last 24 Hours Medication List:     Current Facility-Administered Medications:  albuterol 2 puff Inhalation Q6H PRN Keven Reddy MD   albuterol 2 5 mg Nebulization Q6H PRN Flaquita Lane MD   allopurinol 300 mg Oral Ijeoma Reddy MD   atorvastatin 10 mg Oral Daily Flaquita Lane MD   buPROPion 150 mg Oral Daily Flaquita Lane MD   carBAMazepine 100 mg Oral TID Keven Reddy MD   clopidogrel 75 mg Oral Daily Flaquita Lane MD   diltiazem 180 mg Oral Q12H [address] [facility] [address] [place]   divalproex sodium 250 mg Oral Daily Flaquita Lane MD   fenofibrate 48 mg Oral Daily Flaquita Lane MD   fluticasone-vilanterol 1 puff Inhalation Daily Flaquita Lane MD   levothyroxine 88 mcg Oral Early Morning Flaquita Lane MD   metoprolol tartrate 50 mg Oral Q12H [address] [facility], [place]   ondansetron 4 mg Intravenous Q6H PRN Flaquita Lane MD   senna-docusate sodium 1 tablet Oral Daily PRN Flaquita Lane MD   warfarin 5 mg Oral Daily (warfarin) Suzan Cortez MD        Today, Patient Was Seen By: Suzan Cortez MD    ** Please Note: Dictation voice to text software may have been used in the creation of this document   **

## 2018-12-13 NOTE — SOCIAL WORK
Message received from Yehuda james nurse at [facility]  Therapy notes were faxed to her for review with request to call CM tomorrow to discuss if patient can return directly to [facility]

## 2018-12-13 NOTE — PLAN OF CARE
Problem: PHYSICAL THERAPY ADULT  Goal: Performs mobility at highest level of function for planned discharge setting  See evaluation for individualized goals  Treatment/Interventions: Functional transfer training, LE strengthening/ROM, Elevations, Therapeutic exercise, Endurance training, Cognitive reorientation, Patient/family training, Bed mobility, Equipment eval/education, Gait training, Spoke to nursing  Equipment Recommended:  (use of RW at this time)       See flowsheet documentation for full assessment, interventions and recommendations  Outcome: Progressing  Prognosis: Good  Problem List: Decreased strength, Decreased endurance, Impaired balance, Decreased mobility, Decreased cognition, Impaired judgement, Pain  Assessment: Ms Rinku Santillan was seen in therapy session this afternoon  Her daughter was also present  Dtr provided info regarding home set up including 10 steps to bedroom with B rails within reach  She walks a lot during the day when she is home and mostly does not use an assistive device  She has a walker that she usually leaves up against a wall  She was walking and negotiating stairs with distant S prior to admission  Someone is always at the personal care home for assistance but not specifically for Ms Tobias alone  Ms Rinku Santillan requires close S-CGA with amb with RW and also walks with her dtr with HHA and increased postural sway  She does become fatigued with stair negotiation but is determined to try it again after a seated rest break and completes an additional 7 steps with 2 rails and close S-CGA of her dtr  She continues to have pain/cramping in R calf though it is getting better  This became bothersome on the stairs and with walking  This patient is at a high risk for falls and it is recommended that she transition to inpatient rehab or go home with full time care for safety, balance, and mobility      Barriers to Discharge: Inaccessible home environment, Decreased caregiver support (distant S at personal care home)  Barriers to Discharge Comments: decreased balance and cognition put patient at a high fall risk  Recommendation:  (STR vs personal care home with increased assist and home PT)     PT - OK to Discharge: Yes (when medically appropriate)    See flowsheet documentation for full assessment

## 2018-12-14 VITALS
HEART RATE: 52 BPM | BODY MASS INDEX: 36.27 KG/M2 | RESPIRATION RATE: 16 BRPM | SYSTOLIC BLOOD PRESSURE: 119 MMHG | TEMPERATURE: 98.7 F | WEIGHT: 184.75 LBS | DIASTOLIC BLOOD PRESSURE: 90 MMHG | HEIGHT: 60 IN | OXYGEN SATURATION: 92 %

## 2018-12-14 LAB
ANION GAP SERPL CALCULATED.3IONS-SCNC: 8 MMOL/L (ref 4–13)
BUN SERPL-MCNC: 23 MG/DL (ref 5–25)
CALCIUM SERPL-MCNC: 9.4 MG/DL (ref 8.3–10.1)
CHLORIDE SERPL-SCNC: 109 MMOL/L (ref 100–108)
CO2 SERPL-SCNC: 27 MMOL/L (ref 21–32)
CREAT SERPL-MCNC: 1.26 MG/DL (ref 0.6–1.3)
GFR SERPL CREATININE-BSD FRML MDRD: 43 ML/MIN/1.73SQ M
GLUCOSE SERPL-MCNC: 98 MG/DL (ref 65–140)
INR PPP: 2.78 (ref 0.86–1.17)
POTASSIUM SERPL-SCNC: 4 MMOL/L (ref 3.5–5.3)
PROTHROMBIN TIME: 29.4 SECONDS (ref 11.8–14.2)
SODIUM SERPL-SCNC: 144 MMOL/L (ref 136–145)

## 2018-12-14 PROCEDURE — 85610 PROTHROMBIN TIME: CPT | Performed by: INTERNAL MEDICINE

## 2018-12-14 PROCEDURE — 94760 N-INVAS EAR/PLS OXIMETRY 1: CPT

## 2018-12-14 PROCEDURE — 97116 GAIT TRAINING THERAPY: CPT

## 2018-12-14 PROCEDURE — 99239 HOSP IP/OBS DSCHRG MGMT >30: CPT | Performed by: INTERNAL MEDICINE

## 2018-12-14 PROCEDURE — 80048 BASIC METABOLIC PNL TOTAL CA: CPT | Performed by: INTERNAL MEDICINE

## 2018-12-14 RX ORDER — ALBUTEROL SULFATE 2.5 MG/3ML
2.5 SOLUTION RESPIRATORY (INHALATION) EVERY 6 HOURS PRN
Qty: 1 VIAL | Refills: 0 | Status: SHIPPED | OUTPATIENT
Start: 2018-12-14 | End: 2019-10-03

## 2018-12-14 RX ORDER — BUPROPION HYDROCHLORIDE 150 MG/1
150 TABLET, EXTENDED RELEASE ORAL DAILY
Qty: 30 TABLET | Refills: 0 | Status: SHIPPED | OUTPATIENT
Start: 2018-12-14

## 2018-12-14 RX ORDER — POTASSIUM CHLORIDE 20 MEQ/1
20 TABLET, EXTENDED RELEASE ORAL DAILY
Qty: 15 TABLET | Refills: 0 | Status: SHIPPED | OUTPATIENT
Start: 2018-12-14 | End: 2021-07-27 | Stop reason: HOSPADM

## 2018-12-14 RX ORDER — LEVOTHYROXINE SODIUM 88 UG/1
88 TABLET ORAL DAILY
Qty: 14 TABLET | Refills: 0 | Status: SHIPPED | OUTPATIENT
Start: 2018-12-14 | End: 2020-05-21 | Stop reason: DRUGHIGH

## 2018-12-14 RX ORDER — FUROSEMIDE 20 MG/1
20 TABLET ORAL DAILY
Qty: 15 TABLET | Refills: 0 | Status: SHIPPED | OUTPATIENT
Start: 2018-12-14 | End: 2018-12-27 | Stop reason: SDUPTHER

## 2018-12-14 RX ORDER — WARFARIN SODIUM 3 MG/1
TABLET ORAL
Qty: 30 TABLET | Refills: 0 | Status: ON HOLD | OUTPATIENT
Start: 2018-12-14 | End: 2020-12-30

## 2018-12-14 RX ORDER — METOPROLOL TARTRATE 50 MG/1
50 TABLET, FILM COATED ORAL EVERY 12 HOURS SCHEDULED
Qty: 60 TABLET | Refills: 0 | Status: SHIPPED | OUTPATIENT
Start: 2018-12-14 | End: 2019-10-25 | Stop reason: SDUPTHER

## 2018-12-14 RX ORDER — ATORVASTATIN CALCIUM 10 MG/1
10 TABLET, FILM COATED ORAL DAILY
Qty: 30 TABLET | Refills: 0 | Status: SHIPPED | OUTPATIENT
Start: 2018-12-14 | End: 2021-06-25 | Stop reason: HOSPADM

## 2018-12-14 RX ORDER — CARBAMAZEPINE 100 MG/1
100 TABLET, CHEWABLE ORAL 3 TIMES DAILY
Qty: 90 TABLET | Refills: 0 | Status: SHIPPED | OUTPATIENT
Start: 2018-12-14

## 2018-12-14 RX ORDER — FLUTICASONE FUROATE AND VILANTEROL 100; 25 UG/1; UG/1
1 POWDER RESPIRATORY (INHALATION) DAILY
Qty: 1 INHALER | Refills: 0 | Status: SHIPPED | OUTPATIENT
Start: 2018-12-14 | End: 2020-04-16 | Stop reason: ALTCHOICE

## 2018-12-14 RX ORDER — FERROUS SULFATE 325(65) MG
325 TABLET ORAL 2 TIMES DAILY
Qty: 30 TABLET | Refills: 0 | Status: SHIPPED | OUTPATIENT
Start: 2018-12-14

## 2018-12-14 RX ORDER — DILTIAZEM HYDROCHLORIDE 90 MG/1
180 CAPSULE, EXTENDED RELEASE ORAL EVERY 12 HOURS SCHEDULED
Qty: 60 CAPSULE | Refills: 0 | Status: SHIPPED | OUTPATIENT
Start: 2018-12-14 | End: 2019-02-01 | Stop reason: SDUPTHER

## 2018-12-14 RX ORDER — ALBUTEROL SULFATE 90 UG/1
2 AEROSOL, METERED RESPIRATORY (INHALATION) EVERY 6 HOURS PRN
Qty: 1 INHALER | Refills: 0 | Status: SHIPPED | OUTPATIENT
Start: 2018-12-14

## 2018-12-14 RX ORDER — FENOFIBRATE 67 MG/1
67 CAPSULE ORAL
Qty: 15 CAPSULE | Refills: 0 | Status: SHIPPED | OUTPATIENT
Start: 2018-12-14 | End: 2021-06-25 | Stop reason: HOSPADM

## 2018-12-14 RX ORDER — ALLOPURINOL 300 MG/1
300 TABLET ORAL EVERY MORNING
Qty: 14 TABLET | Refills: 0 | Status: SHIPPED | OUTPATIENT
Start: 2018-12-14 | End: 2019-10-03

## 2018-12-14 RX ORDER — DIVALPROEX SODIUM 250 MG/1
250 TABLET, EXTENDED RELEASE ORAL DAILY
Qty: 30 TABLET | Refills: 0 | Status: ON HOLD | OUTPATIENT
Start: 2018-12-14 | End: 2019-01-16

## 2018-12-14 RX ORDER — CLOPIDOGREL BISULFATE 75 MG/1
75 TABLET ORAL DAILY
Qty: 90 TABLET | Refills: 0 | Status: SHIPPED | OUTPATIENT
Start: 2018-12-14 | End: 2021-06-25 | Stop reason: HOSPADM

## 2018-12-14 RX ORDER — MULTIVIT-MIN/IRON/FOLIC ACID/K 18-600-40
1 CAPSULE ORAL DAILY
Qty: 14 CAPSULE | Refills: 0 | Status: SHIPPED | OUTPATIENT
Start: 2018-12-14

## 2018-12-14 RX ADMIN — BUPROPION HYDROCHLORIDE 150 MG: 150 TABLET, EXTENDED RELEASE ORAL at 09:48

## 2018-12-14 RX ADMIN — LEVOTHYROXINE SODIUM 88 MCG: 88 TABLET ORAL at 05:08

## 2018-12-14 RX ADMIN — DILTIAZEM HYDROCHLORIDE 180 MG: 90 CAPSULE, EXTENDED RELEASE ORAL at 09:48

## 2018-12-14 RX ADMIN — ALLOPURINOL 300 MG: 300 TABLET ORAL at 09:48

## 2018-12-14 RX ADMIN — DIVALPROEX SODIUM 250 MG: 250 TABLET, EXTENDED RELEASE ORAL at 09:47

## 2018-12-14 RX ADMIN — METOPROLOL TARTRATE 50 MG: 50 TABLET, FILM COATED ORAL at 09:47

## 2018-12-14 RX ADMIN — CARBAMAZEPINE 100 MG: 100 TABLET, CHEWABLE ORAL at 09:49

## 2018-12-14 RX ADMIN — FLUTICASONE FUROATE AND VILANTEROL TRIFENATATE 1 PUFF: 100; 25 POWDER RESPIRATORY (INHALATION) at 09:49

## 2018-12-14 RX ADMIN — FENOFIBRATE 48 MG: 48 TABLET ORAL at 09:48

## 2018-12-14 RX ADMIN — CLOPIDOGREL BISULFATE 75 MG: 75 TABLET ORAL at 09:47

## 2018-12-14 RX ADMIN — ATORVASTATIN CALCIUM 10 MG: 10 TABLET, FILM COATED ORAL at 09:48

## 2018-12-14 NOTE — PLAN OF CARE
Problem: PHYSICAL THERAPY ADULT  Goal: Performs mobility at highest level of function for planned discharge setting  See evaluation for individualized goals  Treatment/Interventions: Functional transfer training, LE strengthening/ROM, Elevations, Therapeutic exercise, Endurance training, Cognitive reorientation, Patient/family training, Bed mobility, Equipment eval/education, Gait training, Spoke to nursing  Equipment Recommended:  (use of RW at this time)       See flowsheet documentation for full assessment, interventions and recommendations  Prognosis: Good  Problem List: Decreased strength, Decreased endurance, Impaired balance, Decreased mobility, Decreased cognition, Impaired judgement  Assessment: Responded to chair alarm: pt was received standing at bedside chair attempting to ambulate independently to the bathroom with urinary catheter hanging on bed  Pt denied RLE cramping/pain today and demonstrated improved tolerance for mobility, ambulating 460' with use of a rolling walker  She tolerated up/down 14 steps without a standing rest break and with S to CGA without LOB  Pt continues to demonstrate unsafe behaviors putting her at a high fall risk including getting up without ringing for staff and single leg stance in the joshua to adjust her sock with posterior LOB requiring min A for correction  Recommendation is STR vs home PT with 24/7 caregiver supervision and A for stair negotiation  Pt is improving quickly and close to her baseline level of function, but continues to demonstrate balance, strength, and endurance deficits that put her at a high risk for falls and would require increased supervision to return to home    Barriers to Discharge: Inaccessible home environment, Decreased caregiver support (10 ERON, distant S at personal care home)  Barriers to Discharge Comments: decreased balance and cognition put patient at a high fall risk  Recommendation:  (STR vs home PT with increased supervision) PT - OK to Discharge: Yes    See flowsheet documentation for full assessment

## 2018-12-14 NOTE — PLAN OF CARE
Problem: Potential for Falls  Goal: Patient will remain free of falls  INTERVENTIONS:  - Assess patient frequently for physical needs  -  Identify cognitive and physical deficits and behaviors that affect risk of falls    -  [place] fall precautions as indicated by assessment   - Educate patient/family on patient safety including physical limitations  - Instruct patient to call for assistance with activity based on assessment  - Modify environment to reduce risk of injury  - Consider OT/PT consult to assist with strengthening/mobility   Outcome: Progressing      Problem: Prexisting or High Potential for Compromised Skin Integrity  Goal: Skin integrity is maintained or improved  INTERVENTIONS:  - Identify patients at risk for skin breakdown  - Assess and monitor skin integrity  - Assess and monitor nutrition and hydration status  - Monitor labs (i e  albumin)  - Assess for incontinence   - Turn and reposition patient  - Assist with mobility/ambulation  - Relieve pressure over bony prominences  - Avoid friction and shearing  - Provide appropriate hygiene as needed including keeping skin clean and dry  - Evaluate need for skin moisturizer/barrier cream  - Collaborate with interdisciplinary team (i e  Nutrition, Rehabilitation, etc )   - Patient/family teaching   Outcome: Progressing      Problem: DISCHARGE PLANNING - CARE MANAGEMENT  Goal: Discharge to post-acute care or home with appropriate resources  INTERVENTIONS:  - Conduct assessment to determine patient/family and health care team treatment goals, and need for post-acute services based on payer coverage, community resources, and patient preferences, and barriers to discharge  - Address psychosocial, clinical, and financial barriers to discharge as identified in assessment in conjunction with the patient/family and health care team  - Arrange appropriate level of post-acute services according to patients   needs and preference and payer coverage in collaboration with the physician and health care team  - Communicate with and update the patient/family, physician, and health care team regarding progress on the discharge plan  - Arrange appropriate transportation to post-acute venues   Outcome: Progressing      Problem: CARDIOVASCULAR - ADULT  Goal: Maintains optimal cardiac output and hemodynamic stability  INTERVENTIONS:  - Monitor I/O, vital signs and rhythm  - Monitor for S/S and trends of decreased cardiac output i e  bleeding, hypotension  - Administer and titrate ordered vasoactive medications to optimize hemodynamic stability  - Assess quality of pulses, skin color and temperature  - Assess for signs of decreased coronary artery perfusion - ex   Angina  - Instruct patient to report change in severity of symptoms   Outcome: Progressing      Problem: RESPIRATORY - ADULT  Goal: Achieves optimal ventilation and oxygenation  INTERVENTIONS:  - Assess for changes in respiratory status  - Assess for changes in mentation and behavior  - Position to facilitate oxygenation and minimize respiratory effort  - Oxygen administration by appropriate delivery method based on oxygen saturation (per order) or ABGs  - Initiate smoking cessation education as indicated  - Encourage broncho-pulmonary hygiene including cough, deep breathe, Incentive Spirometry  - Assess the need for suctioning and aspirate as needed  - Assess and instruct to report SOB or any respiratory difficulty  - Respiratory Therapy support as indicated   Outcome: Progressing      Problem: GENITOURINARY - ADULT  Goal: Maintains or returns to baseline urinary function  INTERVENTIONS:  - Assess urinary function  - Encourage oral fluids to ensure adequate hydration  - Administer IV fluids as ordered to ensure adequate hydration  - Administer ordered medications as needed  - Offer frequent toileting  - Follow urinary retention protocol if ordered   Outcome: Progressing    Goal: Urinary catheter remains patent  INTERVENTIONS:  - Assess patency of urinary catheter  - If patient has a chronic baca, consider changing catheter if non-functioning  - Follow guidelines for intermittent irrigation of non-functioning urinary catheter   Outcome: Progressing      Problem: METABOLIC, FLUID AND ELECTROLYTES - ADULT  Goal: Electrolytes maintained within normal limits  INTERVENTIONS:  - Monitor labs and assess patient for signs and symptoms of electrolyte imbalances  - Administer electrolyte replacement as ordered  - Monitor response to electrolyte replacements, including repeat lab results as appropriate  - Instruct patient on fluid and nutrition as appropriate   Outcome: Progressing      Problem: SKIN/TISSUE INTEGRITY - ADULT  Goal: Skin integrity remains intact  INTERVENTIONS  - Identify patients at risk for skin breakdown  - Assess and monitor skin integrity  - Assess and monitor nutrition and hydration status  - Monitor labs (i e  albumin)  - Assess for incontinence   - Turn and reposition patient  - Assist with mobility/ambulation  - Relieve pressure over bony prominences  - Avoid friction and shearing  - Provide appropriate hygiene as needed including keeping skin clean and dry  - Evaluate need for skin moisturizer/barrier cream  - Collaborate with interdisciplinary team (i e  Nutrition, Rehabilitation, etc )   - Patient/family teaching   Outcome: Progressing    Goal: Incision(s), wounds(s) or drain site(s) healing without S/S of infection  INTERVENTIONS  - Assess and document risk factors for skin impairment   - Assess and document dressing, incision, wound bed, drain sites and surrounding tissue  - Initiate Nutrition services consult and/or wound management as needed   Outcome: Progressing      Problem: MUSCULOSKELETAL - ADULT  Goal: Maintain or return mobility to safest level of function  INTERVENTIONS:  - Assess patient's ability to carry out ADLs; assess patient's baseline for ADL function and identify physical deficits which impact ability to perform ADLs (bathing, care of mouth/teeth, toileting, grooming, dressing, etc )  - Assess/evaluate cause of self-care deficits   - Assess range of motion  - Assess patient's mobility; develop plan if impaired  - Assess patient's need for assistive devices and provide as appropriate  - Encourage maximum independence but intervene and supervise when necessary  - Involve family in performance of ADLs  - Assess for home care needs following discharge   - Request OT consult to assist with ADL evaluation and planning for discharge  - Provide patient education as appropriate   Outcome: Progressing    Goal: Maintain proper alignment of affected body part  INTERVENTIONS:  - Support, maintain and protect limb and body alignment  - Provide pt/fam with appropriate education   Outcome: Progressing

## 2018-12-14 NOTE — SOCIAL WORK
Call from Tammi Franco at [facility] [phone]  She and , Yousuf Owens, reviewed therapy notes and patient is not at level to return  Spoke to daughter, Olivia Mcfadden, [phone] to explain above and that OPTIONs assessment is in process  Leatha met with Maryellen Severe yesterday  Daughter is upset and feels mother is regressing and cannot get enough therapy here to get better  She plans to take patient home today to her house and will provide 24 hour care  Daughter's address is [address] [place], [place], [phone]  Discussed HHC and leatha does not feel services are needed  Patient has a walker at home  Discussed with RN and Dr Jamee Hough

## 2018-12-14 NOTE — PHYSICAL THERAPY NOTE
PT Treatment       12/14/18 1043   Pain Assessment   Pain Assessment No/denies pain   Pain Score No Pain   Restrictions/Precautions   Weight Bearing Precautions Per Order No   Other Precautions Cognitive; Chair Alarm; Bed Alarm; Fall Risk;Telemetry   General   Chart Reviewed Yes   Response to Previous Treatment Patient with no complaints from previous session  Family/Caregiver Present No   Cognition   Overall Cognitive Status Impaired   Arousal/Participation Cooperative; Alert   Attention Attends with cues to redirect   Orientation Level Oriented to person;Oriented to place   Memory Decreased short term memory;Decreased recall of recent events;Decreased recall of precautions   Following Commands Follows one step commands with increased time or repetition   Comments Pt is pleasant and willing to participate in PT session  She demonstrates decreased safety awareness and impaired short term memory   Subjective   Subjective Reports that she stood to go to the bathroom to rinse her teeth   Bed Mobility   Additional Comments Standing at bedside chair on arrival   Transfers   Sit to Stand 5  Supervision   Additional items Assist x 1; Increased time required;Verbal cues   Stand to Sit 5  Supervision   Additional items Assist x 1; Increased time required;Verbal cues   Stand pivot 5  Supervision   Additional items Assist x 1; Increased time required;Verbal cues   Ambulation/Elevation   Gait pattern Forward Flexion; Wide JENNYFER; Decreased foot clearance; Inconsistent randi; Excessively slow; Short stride   Gait Assistance 5  Supervision   Additional items Verbal cues; Tactile cues   Assistive Device Rolling walker   Distance 460'   Stair Management Assistance (CGA)   Additional items Verbal cues   Stair Management Technique Two rails; Reciprocal   Number of Stairs 14   Balance   Static Sitting Fair +   Dynamic Sitting Fair +   Static Standing Fair   Dynamic Standing Fair   Ambulatory Fair   Endurance Deficit   Endurance Deficit No Activity Tolerance   Activity Tolerance Patient tolerated treatment well   Assessment   Prognosis Good   Problem List Decreased strength;Decreased endurance; Impaired balance;Decreased mobility; Decreased cognition; Impaired judgement   Assessment Responded to chair alarm: pt was received standing at bedside chair attempting to ambulate independently to the bathroom with urinary catheter hanging on bed  Pt denied RLE cramping/pain today and demonstrated improved tolerance for mobility, ambulating 460' with use of a rolling walker  She tolerated up/down 14 steps without a standing rest break and with S to CGA without LOB  Pt continues to demonstrate unsafe behaviors putting her at a high fall risk including getting up without ringing for staff and single leg stance in the joshua to adjust her sock with posterior LOB requiring min A for correction  Recommendation is STR vs home PT with 24/7 caregiver supervision and A for stair negotiation  Pt is improving quickly and close to her baseline level of function, but continues to demonstrate balance, strength, and endurance deficits that put her at a high risk for falls and would require increased supervision to return to home  Barriers to Discharge Inaccessible home environment;Decreased caregiver support  (10 ERON, distant S at personal care home)   Goals   Patient Goals To go home   STG Expiration Date 12/18/18   Short Term Goal #1 1  Pt will perform all bed mobility at an independent level to facilitate OOB mobility 2  Pt will perform all functional transfers at a mod I level using RW vs rollator (per dtr pt typically refuses RW at home and will only use rollator) to facilitate increased safety upright mobility 3  Pt will ambulate 500 ft with RW vs rollator at a supervision level to allow for increased independence with mobility in personal care home 4  Pt will improve dynamic standing balance to Fair+/Good to decrease fall risk 5   Pt will negotiate 10 steps with supervision using LRAD and/or handrail PRN to allow pt to access her home environment safely   Treatment Day 5   Plan   Treatment/Interventions Functional transfer training;LE strengthening/ROM; Elevations; Therapeutic exercise; Endurance training;Patient/family training;Equipment eval/education; Bed mobility;Gait training;Spoke to nursing   Progress Improving as expected   PT Frequency (4-6x/wk)   Recommendation   Recommendation (STR vs home PT with increased supervision)   Equipment Recommended Shelly Walter   PT - OK to Discharge Yes   Additional Comments When medically appropriate      Siobhan Player, PT, DPT

## 2018-12-14 NOTE — SOCIAL WORK
Call from [facility] that they cannot fill scripts since it is too early for refills  Called nurse, Linden Delgado at [facility] and they sent meds back to pharmacy  CM faxed scripts to [address] [place] [address] at [phone]  They wll deliver to [facility] and nacho can  after 7pm  Gave nacho copies of scripts and informed her of above  Call from KEN at [address] [place] [address], [phone], requesting copy of DCI which was faxed to her

## 2018-12-15 NOTE — ASSESSMENT & PLAN NOTE
Rate control on current dose of Cardizem and metoprolol  On Coumadin for anticoagulation with therapeutic INR  Continue coumadin  Recheck INR on Monday

## 2018-12-15 NOTE — ASSESSMENT & PLAN NOTE
Continue with 180 mg daily Cardizem and 50 mg b i d  Lopressor    Lowe lasix dose to 20mg daily on discharge,

## 2018-12-15 NOTE — ASSESSMENT & PLAN NOTE
Baseline creatinine seems to be between 1 2-1 6 on review of previous records, although creatinine has been stable around 1 1 during this hospitalization  Will discharge on lower dose of lasix  Trend BMP

## 2018-12-15 NOTE — ASSESSMENT & PLAN NOTE
Physical therapy recommends rehab   is aware  As per  discussion with patient's daughter, patient had to be year old healthy unit admissions last year  Appreciate psychiatry evaluation  Noted Cm' s conversation with daughter and We both had extensive discussion with patient and her daughter at bedside  As per daughter, she will take patient to her home and will provide 24 hour care by herself  She denied home PT which we offered as well  Patient agreed with this

## 2018-12-15 NOTE — ASSESSMENT & PLAN NOTE
Hemoglobin now stable between 8-10  without any signs of active bleeding  Trend CBC  Noted normal folic acid, vitamin E22 and TSH

## 2018-12-15 NOTE — ASSESSMENT & PLAN NOTE
s/p angiogram showing initial stent fracture, placement of B TEMO stents, R EIA distal stent extension for dissection     Repeat arterial Dopplers were obtained given right lower extremity edema which shows improvement in bilateral ANABEL  Venous duplex: 12/9  neg for DVT  ANABEL 12/9: ANABEL: R0 77, L 0 99  PLAN:  Continue with Plavix and statin  Continue anticoagulation with Coumadin  INR therapeutic ,  Recheck INR on Monday  Appreciate vascular surgery input  Outpatient vascular surgery follow-up

## 2018-12-15 NOTE — DISCHARGE SUMMARY
Discharge- [facility] [place] [birth year], 68 y o  female MRN: [id number]    Unit/Bed#: [facility] 529-01 Encounter: [id number]    Primary Care Provider: Onelia Renee DO   Date and time admitted to hospital: 12/7/2018  3:03 PM        * Peripheral artery disease ([address] )   Assessment & Plan    s/p angiogram showing initial stent fracture, placement of B TEMO stents, R EIA distal stent extension for dissection     Repeat arterial Dopplers were obtained given right lower extremity edema which shows improvement in bilateral ANABEL  Venous duplex: 12/9  neg for DVT  ANABEL 12/9: ANABEL: R0 77, L 0 99  PLAN:  Continue with Plavix and statin  Continue anticoagulation with Coumadin  INR therapeutic ,  Recheck INR on Monday  Appreciate vascular surgery input  Outpatient vascular surgery follow-up  Macrocytic anemia   Assessment & Plan    Hemoglobin now stable between 8-10  without any signs of active bleeding  Trend CBC  Noted normal folic acid, vitamin N99 and TSH  Stage 3 chronic kidney disease (HCC)   Assessment & Plan    Baseline creatinine seems to be between 1 2-1 6 on review of previous records, although creatinine has been stable around 1 1 during this hospitalization  Will discharge on lower dose of lasix  Trend BMP  Ambulatory dysfunction   Assessment & Plan    Physical therapy recommends rehab   is aware  As per  discussion with patient's daughter, patient had to be year old healthy unit admissions last year  Appreciate psychiatry evaluation  Noted Cm' s conversation with daughter and We both had extensive discussion with patient and her daughter at bedside  As per daughter, she will take patient to her home and will provide 24 hour care by herself  She denied home PT which we offered as well  Patient agreed with this  Seizure disorder [facility])   Assessment & Plan    Continue with home dose carbamazepine and Depakote       Hyperlipidemia   Assessment & Plan    Continue statin     Atrial flutter (HCC)   Assessment & Plan    Rate control on current dose of Cardizem and metoprolol  On Coumadin for anticoagulation with therapeutic INR  Continue coumadin  Recheck INR on Monday  Chronic respiratory failure (HCC)   Assessment & Plan    Stable  O2 supplementation as needed to maintain O2 sats between 88-92 %  Incentive spirometry     COPD (chronic obstructive pulmonary disease) (HCC)   Assessment & Plan    Not in exacerbation  Continue with current dose of Breo Ellipta, albuterol  Has oxygen at home which she uses intermittently  HTN (hypertension)   Assessment & Plan    Continue with 180 mg daily Cardizem and 50 mg b i d  Lopressor  Lowe lasix dose to 20mg daily on discharge,         Discharge Summary - [facility]    Patient Information: Chris Euceda 68 y o  female MRN: [id number]  Unit/Bed#: [facility] 529-01 Encounter: [id number]    Discharging Physician / Practitioner: Luisa Leyva MD  PCP: Noelle Snellen, DO  Admission Date: 12/7/2018  Discharge Date: 12/14/18    Reason for Admission: left lower extremity pain and claudication    Discharge Diagnoses:     Principal Problem:    Peripheral artery disease ([facility] )  Active Problems:    HTN (hypertension)    COPD (chronic obstructive pulmonary disease) (HCC)    Chronic respiratory failure (HCC)    Atrial flutter (HCC)    Hyperlipidemia    Seizure disorder ([facility] )    Ambulatory dysfunction    Stage 3 chronic kidney disease ([facility] )    Macrocytic anemia  Resolved Problems:    * No resolved hospital problems   *      Consultations During Hospital Stay:  · Cardiology  · Vascular surgery  · Behavioral health    Procedures Performed:     · A gram and stent placemnt as above    Significant Findings / Test Results:     VAS lower limb arterial duplex, complete bilateral   Final Result by Bonnie Reddy MD (12/10 8094)      VAS lower limb venous duplex study, complete bilateral   Final Result by Bonnie Reddy MD (12/09 [phone]) IR abdominal angiography / intervention   Final Result by Haile Fraga MD (12/08 2006)   Impression:    Successful treatment of fractured right common and external iliac stent with coexisting distal abdominal aortic dissection, bilateral common iliac artery dissections, and right external iliac artery dissection with placement of " kissing " common iliac    Viabahn stent grafts extending into the lower abdominal aorta with successful exclusion of the fractured stent from the circulation as described,  and significantly less contrast filling the aortic and common iliac dissections  The right external iliac    stent was extended down to the inguinal ligament to treat the external iliac dissection  A good result was obtained, as described above  Workstation performed: [id number]         ·      Outpatient Tests Requested:  · Repeat INR on Monday  · Repeat BMP in 1 week  Hospital Course: Gayathri Carrasco is a 68 y o  female patient with h/o aflutter on coumadin, COPD, CKD, PAD s/p LLE endovascular intervention, underwent RLE agram and intervention with Dr Faye Gonzalez at [place] [address] complicated by misplaced R EIA stent deployment and aortoiliac dissection, transferred to [facility] and had emergent Angiogram showing stent fracture and s/p placement of B TEMO kissing Viabahn stents, R EIA stent extention for dissection  Patient' s coumadin and plavix was started post procedure and she tolerated well  She had relatively uneventful recovery at [facility] and PT recommended rehab  Due to patient' s previous inpatient psych unit admissions, pt was considered target pt  Psychiatrist cleared the patient for discharge but daughter did not want to wait for county approval and requested to take pt to her home and they also refused home PT  Pt' s INR was therapeutic on discharge and follow up INR was recommended  Please refer to detailed assessment and plan above for further details      Condition at Discharge: fair Discharge Day Visit / Exam:     Subjective:  No overnight events  Denies any acute complaints this AM     Vitals: Blood Pressure: 119/90 (12/14/18 0743)  Pulse: (!) 52 (12/14/18 0743)  Temperature: 98 7 °F (37 1 °C) (12/14/18 0743)  Temp Source: Oral (12/14/18 0010)  Respirations: 16 (12/14/18 0743)  Height: 5' (152 4 cm) (12/07/18 2043)  Weight - Scale: 83 8 kg (184 lb 11 9 oz) (12/11/18 0518)  SpO2: 92 % (12/14/18 0849)  Exam:   Physical Exam   Constitutional: She is oriented to person, place, and time  No distress  Eyes: Pupils are equal, round, and reactive to light  Cardiovascular: Normal rate, regular rhythm, normal heart sounds and intact distal pulses     No murmur heard  Pulmonary/Chest: Breath sounds normal  No respiratory distress  She has no wheezes  She has no rales  Abdominal: Soft  Bowel sounds are normal  She exhibits no distension  There is no tenderness  Musculoskeletal: no LE edema      Neurological: She is alert and oriented to person, place, and time  No focal motor deficits   Skin: Skin is warm         Discussion with Family: d/w daughter  Discharge instructions/Information to patient and family:   See after visit summary for information provided to patient and family  Provisions for Follow-Up Care:  See after visit summary for information related to follow-up care and any pertinent home health orders  Disposition:     Home    For Discharges to [facility] SN[facility]:   · Not Applicable to this Patient - Not Applicable to this Patient     Discharge Statement:  I spent 45 minutes discharging the patient  This time was spent on the day of discharge  I had direct contact with the patient on the day of discharge  Greater than 50% of the total time was spent examining patient, answering all patient questions, arranging and discussing plan of care with patient as well as directly providing post-discharge instructions    Additional time then spent on discharge activities  Discharge Medications:  See after visit summary for reconciled discharge medications provided to patient and family        ** Please Note: This note has been constructed using a voice recognition system **

## 2018-12-17 ENCOUNTER — APPOINTMENT (OUTPATIENT)
Dept: LAB | Facility: HOSPITAL | Age: 73
End: 2018-12-17
Attending: INTERNAL MEDICINE
Payer: MEDICARE

## 2018-12-17 DIAGNOSIS — I48.91 ATRIAL FIBRILLATION (HCC): ICD-10-CM

## 2018-12-17 LAB
INR PPP: 2.21 (ref 0.86–1.17)
PROTHROMBIN TIME: 24.2 SECONDS (ref 11.8–14.2)

## 2018-12-17 PROCEDURE — 36415 COLL VENOUS BLD VENIPUNCTURE: CPT

## 2018-12-17 PROCEDURE — 85610 PROTHROMBIN TIME: CPT

## 2018-12-18 ENCOUNTER — ANTICOAG VISIT (OUTPATIENT)
Dept: CARDIOLOGY CLINIC | Facility: CLINIC | Age: 73
End: 2018-12-18

## 2018-12-18 NOTE — PROGRESS NOTES
Called pt and no answer  LM with instructions and results  Per VK - Same dose and recheck in 2 weeks

## 2018-12-20 ENCOUNTER — HOSPITAL ENCOUNTER (EMERGENCY)
Facility: HOSPITAL | Age: 73
Discharge: HOME | End: 2018-12-20
Attending: EMERGENCY MEDICINE
Payer: MEDICARE

## 2018-12-20 ENCOUNTER — TELEPHONE (OUTPATIENT)
Dept: CARDIOLOGY CLINIC | Facility: CLINIC | Age: 73
End: 2018-12-20

## 2018-12-20 VITALS
SYSTOLIC BLOOD PRESSURE: 158 MMHG | HEART RATE: 60 BPM | OXYGEN SATURATION: 92 % | HEIGHT: 60 IN | WEIGHT: 184.75 LBS | BODY MASS INDEX: 36.27 KG/M2 | RESPIRATION RATE: 16 BRPM | DIASTOLIC BLOOD PRESSURE: 71 MMHG

## 2018-12-20 DIAGNOSIS — T14.8XXA BRUISING: Primary | ICD-10-CM

## 2018-12-20 DIAGNOSIS — R60.0 BILATERAL LEG EDEMA: ICD-10-CM

## 2018-12-20 DIAGNOSIS — Z79.01 ANTICOAGULATED: ICD-10-CM

## 2018-12-20 LAB
ANION GAP SERPL CALCULATED.3IONS-SCNC: 9 MMOL/L (ref 4–13)
APTT PPP: 34 SECONDS (ref 26–38)
BASOPHILS # BLD AUTO: 0.03 THOUSANDS/ΜL (ref 0–0.1)
BASOPHILS NFR BLD AUTO: 1 % (ref 0–1)
BUN SERPL-MCNC: 33 MG/DL (ref 5–25)
CALCIUM SERPL-MCNC: 9.9 MG/DL (ref 8.3–10.1)
CHLORIDE SERPL-SCNC: 107 MMOL/L (ref 100–108)
CO2 SERPL-SCNC: 29 MMOL/L (ref 21–32)
CREAT SERPL-MCNC: 1.46 MG/DL (ref 0.6–1.3)
EOSINOPHIL # BLD AUTO: 0.26 THOUSAND/ΜL (ref 0–0.61)
EOSINOPHIL NFR BLD AUTO: 4 % (ref 0–6)
ERYTHROCYTE [DISTWIDTH] IN BLOOD BY AUTOMATED COUNT: 13.6 % (ref 11.6–15.1)
GFR SERPL CREATININE-BSD FRML MDRD: 36 ML/MIN/1.73SQ M
GLUCOSE SERPL-MCNC: 131 MG/DL (ref 65–140)
HCT VFR BLD AUTO: 30.3 % (ref 34.8–46.1)
HGB BLD-MCNC: 9.5 G/DL (ref 11.5–15.4)
IMM GRANULOCYTES # BLD AUTO: 0.07 THOUSAND/UL (ref 0–0.2)
IMM GRANULOCYTES NFR BLD AUTO: 1 % (ref 0–2)
INR PPP: 1.91 (ref 0.86–1.17)
LYMPHOCYTES # BLD AUTO: 1.69 THOUSANDS/ΜL (ref 0.6–4.47)
LYMPHOCYTES NFR BLD AUTO: 28 % (ref 14–44)
MCH RBC QN AUTO: 32.8 PG (ref 26.8–34.3)
MCHC RBC AUTO-ENTMCNC: 31.4 G/DL (ref 31.4–37.4)
MCV RBC AUTO: 105 FL (ref 82–98)
MONOCYTES # BLD AUTO: 0.49 THOUSAND/ΜL (ref 0.17–1.22)
MONOCYTES NFR BLD AUTO: 8 % (ref 4–12)
NEUTROPHILS # BLD AUTO: 3.59 THOUSANDS/ΜL (ref 1.85–7.62)
NEUTS SEG NFR BLD AUTO: 58 % (ref 43–75)
NRBC BLD AUTO-RTO: 0 /100 WBCS
PLATELET # BLD AUTO: 256 THOUSANDS/UL (ref 149–390)
PMV BLD AUTO: 11.3 FL (ref 8.9–12.7)
POTASSIUM SERPL-SCNC: 4.1 MMOL/L (ref 3.5–5.3)
PROTHROMBIN TIME: 21.7 SECONDS (ref 11.8–14.2)
RBC # BLD AUTO: 2.9 MILLION/UL (ref 3.81–5.12)
SODIUM SERPL-SCNC: 145 MMOL/L (ref 136–145)
WBC # BLD AUTO: 6.13 THOUSAND/UL (ref 4.31–10.16)

## 2018-12-20 PROCEDURE — 85730 THROMBOPLASTIN TIME PARTIAL: CPT | Performed by: EMERGENCY MEDICINE

## 2018-12-20 PROCEDURE — 80048 BASIC METABOLIC PNL TOTAL CA: CPT | Performed by: EMERGENCY MEDICINE

## 2018-12-20 PROCEDURE — 99283 EMERGENCY DEPT VISIT LOW MDM: CPT

## 2018-12-20 PROCEDURE — 85610 PROTHROMBIN TIME: CPT | Performed by: EMERGENCY MEDICINE

## 2018-12-20 PROCEDURE — 85025 COMPLETE CBC W/AUTO DIFF WBC: CPT | Performed by: EMERGENCY MEDICINE

## 2018-12-20 PROCEDURE — 99284 EMERGENCY DEPT VISIT MOD MDM: CPT | Performed by: INTERNAL MEDICINE

## 2018-12-20 PROCEDURE — 36415 COLL VENOUS BLD VENIPUNCTURE: CPT | Performed by: EMERGENCY MEDICINE

## 2018-12-20 NOTE — ED PROVIDER NOTES
History  Chief Complaint   Patient presents with    Post-op Problem     pt with bruising and swelling on legs after having stents placed     Diffuse swelling and painless bruising R posterior leg below the knee since earlier today  No pain  No aggravating or alleviating factors but daughter (who provides addnl hx) thinks it may be related to the tight band that is holding her urinary catheter leg bag in place on her RLE  No cp or sob  No dyspnea or hemoptysis  No direct trauma to leg but was on a bus for several hours yesterday  Ambulating without difficulty  Currently asymptomatic  Prior to Admission Medications   Prescriptions Last Dose Informant Patient Reported? Taking?    Cholecalciferol (VITAMIN D) 2000 units CAPS   No No   Sig: Take 1 capsule (2,000 Units total) by mouth daily   Citric Is-Fmjbnyjjcfl-Nn Carb (RENACIDIN) SOLN   Yes No   Sig: Indications: 60ml to irrigate baca once a week   OXYGEN-HELIUM IN   Yes No   Sig: Inhale   Probiotic Product (ACIDOPHILUS/BIFIDUS PO)   Yes No   Sig: Take by mouth   Sennosides-Docusate Sodium (SENNA PLUS PO)   Yes No   Sig: Take by mouth daily as needed   acetaminophen (TYLENOL) 500 mg tablet   Yes No   Sig: Take 500 mg by mouth every 6 (six) hours as needed for mild pain   albuterol (2 5 mg/3 mL) 0 083 % nebulizer solution   No No   Sig: Take 1 vial (2 5 mg total) by nebulization every 6 (six) hours as needed for wheezing   albuterol (PROVENTIL HFA,VENTOLIN HFA) 90 mcg/act inhaler   No No   Sig: Inhale 2 puffs every 6 (six) hours as needed for wheezing   allopurinol (ZYLOPRIM) 300 mg tablet   No No   Sig: Take 1 tablet (300 mg total) by mouth every morning for 14 days   atorvastatin (LIPITOR) 10 mg tablet   No No   Sig: Take 1 tablet (10 mg total) by mouth daily   buPROPion (WELLBUTRIN SR) 150 mg 12 hr tablet   No No   Sig: Take 1 tablet (150 mg total) by mouth daily   carBAMazepine (TEGretol) 100 mg chewable tablet   No No   Sig: Chew 1 tablet (100 mg total) 3 (three) times a day   clopidogrel (PLAVIX) 75 mg tablet   No No   Sig: Take 1 tablet (75 mg total) by mouth daily   diltiazem (CARDIZEM SR) 90 mg 12 hr capsule   No No   Sig: Take 2 capsules (180 mg total) by mouth every 12 (twelve) hours   divalproex sodium (DEPAKOTE ER) 250 mg 24 hr tablet   No No   Sig: Take 1 tablet (250 mg total) by mouth daily   fenofibrate micronized (LOFIBRA) 67 MG capsule   No No   Sig: Take 1 capsule (67 mg total) by mouth daily with breakfast   ferrous sulfate 325 (65 Fe) mg tablet   No No   Sig: Take 1 tablet (325 mg total) by mouth 2 (two) times a day   fluticasone-vilanterol (BREO ELLIPTA) 100-25 mcg/inh inhaler   No No   Sig: Inhale 1 puff daily   furosemide (LASIX) 20 mg tablet   No No   Sig: Take 1 tablet (20 mg total) by mouth daily   levothyroxine 88 mcg tablet   No No   Sig: Take 1 tablet (88 mcg total) by mouth daily   metoprolol tartrate (LOPRESSOR) 50 mg tablet   No No   Sig: Take 1 tablet (50 mg total) by mouth every 12 (twelve) hours   nitroglycerin (NITROSTAT) 0 4 mg SL tablet   No No   Sig: Place 1 tablet (0 4 mg total) under the tongue every 5 (five) minutes as needed for chest pain   potassium chloride (K-DUR,KLOR-CON) 20 mEq tablet   No No   Sig: Take 1 tablet (20 mEq total) by mouth daily   warfarin (COUMADIN) 3 mg tablet   No No   Sig: One tablet daily or as otherwise directed        Facility-Administered Medications: None       Past Medical History:   Diagnosis Date    Atrial fibrillation ([facility] )     Brain aneurysm     CAD (coronary artery disease)     Cardiac disease     had stents 12 years ago in [address] [place] [address] CH[facility]congestive [facility] failure) [facility]     CKD (chronic kidney disease)     COPD (chronic obstructive pulmonary disease) (HCC)     Dementia     Dementia     Disease of thyroid gland     GERD (gastroesophageal reflux disease)     Hyperlipidemia     Hyperlipidemia     Hypertension     MI (myocardial infarction) ([facility] )     Migraine     Renal disorder     Seizures ([facility] )     Stroke [facility])        Past Surgical History:   Procedure Laterality Date    APPENDECTOMY      ARTERIOGRAM N/A 12/7/2018    Procedure: ARTERIOGRAM WITH STENT PLACEMENT;  Surgeon: Lori Reddy MD;  [facility]: [facility];  Service: Vascular    BLADDER TUMOR EXCISION      BRAIN SURGERY      1998  clip right frontal lobe    CARDIAC SURGERY      COLONOSCOPY      CORONARY STENT PLACEMENT      5 stents    EYE SURGERY      IR ABDOMINAL ANGIOGRAPHY / INTERVENTION  12/7/2018    MANDIBLE FRACTURE SURGERY      TONSILLECTOMY      TUBAL LIGATION      UTERINE FIBROID SURGERY         Family History   Problem Relation Age of Onset    Heart disease Father     Parkinsonism Father     Macular degeneration Mother     Glaucoma Mother     Diabetes Brother     Heart disease Brother      I have reviewed and agree with the history as documented  Social History   Substance Use Topics    Smoking status: Former Smoker     Quit date: 9/7/2007    Smokeless tobacco: Never Used    Alcohol use No        Review of Systems   Musculoskeletal: Negative for arthralgias, gait problem, joint swelling, myalgias and neck pain  Skin: Positive for color change  Hematological: Bruises/bleeds easily  All other systems reviewed and are negative  Physical Exam  Physical Exam   Constitutional: She is oriented to person, place, and time  She appears well-developed and well-nourished  No distress  HENT:   Head: Normocephalic and atraumatic  Eyes: Pupils are equal, round, and reactive to light  Conjunctivae and EOM are normal    Neck: Normal range of motion  Neck supple  No JVD present  Cardiovascular: Normal rate, regular rhythm, normal heart sounds and intact distal pulses  Pulmonary/Chest: Effort normal and breath sounds normal  No stridor  Musculoskeletal: Normal range of motion  She exhibits edema  She exhibits no tenderness or deformity     Neurological: She is alert and oriented to person, place, and time  No cranial nerve deficit or sensory deficit  She exhibits normal muscle tone  Coordination normal    Skin: Skin is warm and dry  Capillary refill takes less than 2 seconds  Rash noted  She is not diaphoretic  No erythema  No pallor  Nursing note and vitals reviewed  Vital Signs  ED Triage Vitals [12/20/18 1545]   Temp Pulse Respirations Blood Pressure SpO2   -- 60 16 158/71 92 %      Temp Source Heart Rate Source Patient Position - Orthostatic VS BP Location FiO2 (%)   Oral Monitor Sitting Right arm --      Pain Score       No Pain           Vitals:    12/20/18 1545   BP: 158/71   Pulse: 60   Patient Position - Orthostatic VS: Sitting       Visual Acuity      ED Medications  Medications - No data to display    Diagnostic Studies  Results Reviewed     Procedure Component Value Units Date/Time    Basic metabolic panel [[id number]]  (Abnormal) Collected:  12/20/18 1619    Lab Status:  Final result Specimen:  Blood from Arm, Left Updated:  12/20/18 1644     Sodium 145 mmol/L      Potassium 4 1 mmol/L      Chloride 107 mmol/L      CO2 29 mmol/L      ANION GAP 9 mmol/L      BUN 33 (H) mg/dL      Creatinine 1 46 (H) mg/dL      Glucose 131 mg/dL      Calcium 9 9 mg/dL      eGFR 36 ml/min/1 73sq m     Narrative:         National Kidney Disease Education Program recommendations are as follows:  GFR calculation is accurate only with a steady state creatinine  Chronic Kidney disease less than 60 ml/min/1 73 sq  meters  Kidney failure less than 15 ml/min/1 73 sq  meters      Protime-INR [[id number]]  (Abnormal) Collected:  12/20/18 1619    Lab Status:  Final result Specimen:  Blood from Arm, Left Updated:  12/20/18 1635     Protime 21 7 (H) seconds      INR 1 91 (H)    APTT [[id number]]  (Normal) Collected:  12/20/18 1619    Lab Status:  Final result Specimen:  Blood from Arm, Left Updated:  12/20/18 1635     PTT 34 seconds     CBC and differential [[id number]]  (Abnormal) Collected:  12/20/18 1619 Lab Status:  Final result Specimen:  Blood from Arm, Left Updated:  12/20/18 1624     WBC 6 13 Thousand/uL      RBC 2 90 (L) Million/uL      Hemoglobin 9 5 (L) g/dL      Hematocrit 30 3 (L) %       (H) fL      MCH 32 8 pg      MCHC 31 4 g/dL      RDW 13 6 %      MPV 11 3 fL      Platelets 523 Thousands/uL      nRBC 0 /100 WBCs      Neutrophils Relative 58 %      Immat GRANS % 1 %      Lymphocytes Relative 28 %      Monocytes Relative 8 %      Eosinophils Relative 4 %      Basophils Relative 1 %      Neutrophils Absolute 3 59 Thousands/µL      Immature Grans Absolute 0 07 Thousand/uL      Lymphocytes Absolute 1 69 Thousands/µL      Monocytes Absolute 0 49 Thousand/µL      Eosinophils Absolute 0 26 Thousand/µL      Basophils Absolute 0 03 Thousands/µL                  No orders to display              Procedures  Procedures       Phone Contacts  ED Phone Contact    ED Course                               MDM  Number of Diagnoses or Management Options  Anticoagulated:   Bilateral leg edema:   Bruising:   Diagnosis management comments: R leg bruising in pt on coumadin  INR not supratherapeutic  CBC unremarkable  Suspect bruising due to mild trauma and hematoma  No clinical evidence of DVT, pt and daughter additionally doubt this dx  I offered a duplex study to r/o DVT but they decline and wish to d/c home which I think is reasonable          Amount and/or Complexity of Data Reviewed  Clinical lab tests: ordered and reviewed  Tests in the radiology section of CPT®: ordered and reviewed  Discussion of test results with the performing providers: yes  Obtain history from someone other than the patient: yes  Review and summarize past medical records: yes  Discuss the patient with other providers: yes      CritCare Time    Disposition  Final diagnoses:   Bilateral leg edema   Bruising   Anticoagulated     Time reflects when diagnosis was documented in both MDM as applicable and the Disposition within this note Time User Action Codes Description Comment    12/20/2018  4:39 PM Kiara Ericka Add [R60 0] Bilateral leg edema     12/20/2018  4:39 PM Roach, [place] Sang Modify [R60 0] Bilateral leg edema     12/20/2018  4:39 PM Roach, [place]wood Sang Modify [R60 0] Bilateral leg edema     12/20/2018  4:58 PM Roach, Glenwood Sang Add [T14  8XXA] Bruising     12/20/2018  4:58 PM Roach, Glenwood Sang Modify [R60 0] Bilateral leg edema     12/20/2018  4:58 PM Roach, Trev Modify [T14  8XXA] Bruising     12/20/2018  4:58 PM Roach, Glenwood Sang Add [Z79 01] Anticoagulated       ED Disposition     ED Disposition Condition Comment    Discharge  [facility] discharge to home/self care      Condition at discharge: Stable        Follow-up Information    None         Discharge Medication List as of 12/20/2018  5:00 PM      CONTINUE these medications which have NOT CHANGED    Details   acetaminophen (TYLENOL) 500 mg tablet Take 500 mg by mouth every 6 (six) hours as needed for mild pain, Historical Med      albuterol (2 5 mg/3 mL) 0 083 % nebulizer solution Take 1 vial (2 5 mg total) by nebulization every 6 (six) hours as needed for wheezing, Starting Fri 12/14/2018, Print      albuterol (PROVENTIL HFA,VENTOLIN HFA) 90 mcg/act inhaler Inhale 2 puffs every 6 (six) hours as needed for wheezing, Starting Fri 12/14/2018, Print      allopurinol (ZYLOPRIM) 300 mg tablet Take 1 tablet (300 mg total) by mouth every morning for 14 days, Starting Fri 12/14/2018, Until Fri 12/28/2018, Print      atorvastatin (LIPITOR) 10 mg tablet Take 1 tablet (10 mg total) by mouth daily, Starting Fri 12/14/2018, Print      buPROPion (WELLBUTRIN SR) 150 mg 12 hr tablet Take 1 tablet (150 mg total) by mouth daily, Starting Fri 12/14/2018, Print      carBAMazepine (TEGretol) 100 mg chewable tablet Chew 1 tablet (100 mg total) 3 (three) times a day, Starting Fri 12/14/2018, Print      Cholecalciferol (VITAMIN D) 2000 units CAPS Take 1 capsule (2,000 Units total) by mouth daily, Starting Fri 12/14/2018, Print Citric Vc-Ckjxareyoyb-Wb Carb (RENACIDIN) SOLN Indications: 60ml to irrigate baca once a week, Historical Med      clopidogrel (PLAVIX) 75 mg tablet Take 1 tablet (75 mg total) by mouth daily, Starting Fri 12/14/2018, Print      diltiazem (CARDIZEM SR) 90 mg 12 hr capsule Take 2 capsules (180 mg total) by mouth every 12 (twelve) hours, Starting Fri 12/14/2018, Print      divalproex sodium (DEPAKOTE ER) 250 mg 24 hr tablet Take 1 tablet (250 mg total) by mouth daily, Starting Fri 12/14/2018, Print      fenofibrate micronized (LOFIBRA) 67 MG capsule Take 1 capsule (67 mg total) by mouth daily with breakfast, Starting Fri 12/14/2018, Print      ferrous sulfate 325 (65 Fe) mg tablet Take 1 tablet (325 mg total) by mouth 2 (two) times a day, Starting Fri 12/14/2018, Print      fluticasone-vilanterol (BREO ELLIPTA) 100-25 mcg/inh inhaler Inhale 1 puff daily, Starting Fri 12/14/2018, Print      furosemide (LASIX) 20 mg tablet Take 1 tablet (20 mg total) by mouth daily, Starting Fri 12/14/2018, Print      levothyroxine 88 mcg tablet Take 1 tablet (88 mcg total) by mouth daily, Starting Fri 12/14/2018, Print      metoprolol tartrate (LOPRESSOR) 50 mg tablet Take 1 tablet (50 mg total) by mouth every 12 (twelve) hours, Starting Fri 12/14/2018, Print      nitroglycerin (NITROSTAT) 0 4 mg SL tablet Place 1 tablet (0 4 mg total) under the tongue every 5 (five) minutes as needed for chest pain, Starting Sat 5/12/2018, Normal      OXYGEN-HELIUM IN Inhale, Historical Med      potassium chloride (K-DUR,KLOR-CON) 20 mEq tablet Take 1 tablet (20 mEq total) by mouth daily, Starting Fri 12/14/2018, Print      Probiotic Product (ACIDOPHILUS/BIFIDUS PO) Take by mouth, Historical Med      Sennosides-Docusate Sodium (SENNA PLUS PO) Take by mouth daily as needed, Historical Med      warfarin (COUMADIN) 3 mg tablet One tablet daily or as otherwise directed  , Print           No discharge procedures on file      ED Provider  Electronically Signed by           Arlen Kayser, MD  12/21/18 0914

## 2018-12-20 NOTE — DISCHARGE INSTRUCTIONS
Blood Thinners   WHAT YOU NEED TO KNOW:   Blood thinners are medicines that prevent blood clots from forming in an artery, vein, or the heart  These medicines may also prevent a blood clot from getting bigger  Blood clots prevent the flow of blood to organs and tissues such as the heart or a leg  The two main types of blood thinners are antiplatelet medicine and anticoagulant medicine  Antiplatelet medicine prevents platelets from sticking together and forming a clot  Anticoagulant medicine prevents the blood from clotting too much  DISCHARGE INSTRUCTIONS:   Call 911 for any of the following:   · You have any of the following signs of a heart attack:      ¨ Squeezing, pressure, or pain in your chest that lasts longer than 5 minutes or returns    ¨ Discomfort or pain in your back, neck, jaw, stomach, or arm     ¨ Trouble breathing    ¨ Nausea or vomiting    ¨ Lightheadedness or a sudden cold sweat, especially with chest pain or trouble breathing    · You have any of the following signs of a stroke:      ¨ Numbness or drooping on one side of your face     ¨ Weakness in an arm or leg    ¨ Confusion or difficulty speaking    ¨ Dizziness, a severe headache, or vision loss    · You feel lightheaded, short of breath, and have chest pain  · You cough up blood  · You have trouble breathing  · You are bleeding from a wound or skin injury and it won't stop after holding pressure for 10 minutes  · You have a fall or injury to the head  Return to the emergency department immediately if:   · You have a bad stomachache or headache that does not go away  · You feel weak, faint, or dizzy  · You vomit blood  · You have a fall or injury to any part of your body other than your head  · You are pregnant or think you may be pregnant  Contact your healthcare provider if:   · Your urine is red or dark brown  · Your bowel movements are red, dark brown, or black       · You bleed more than usual during your period  · You find bruises or blood blisters on your skin  · Your skin is itchy, swollen, or you have a rash  · You have questions or concerns about your condition or care  Foods and medicines to avoid:  Do not start any new medicines, vitamins, or herbal supplements before you talk to your healthcare provider  Do not make changes to your diet without talking to your healthcare provider  There are many medicines, vitamins, and herbal supplements that may prevent blood thinners from working correctly  Ask your healthcare provider for a full list of foods and medicines that can prevent anticoagulants from working correctly  The following may cause severe bleeding, or prevent anticoagulants from working correctly:  · Alcohol  may increase your risk for bleeding when taken with anticoagulants  Do not drink alcohol unless your healthcare provider says it is okay  · Changes in your regular vitamin K intake  can prevent anticoagulants, such as warfarin, from working correctly  Anticoagulants work best if you eat the same amount of vitamin K every day  Some foods that contain vitamin K include spinach, kale, broccoli, reuben lettuce, arabella greens, and kiwi  Ask your healthcare provider for more information about foods that contain vitamin K      · NSAIDs  may increase your risk for bleeding  Examples include ibuprofen, aspirin, and naproxen  Do not take these medicines unless your healthcare provider says it is okay  · Some antibiotics  may increase your risk for bleeding  Talk to your healthcare provider before you take antibiotics  · Alternative medicines  such as ginkgo biloba, garlic, chamomile, and St  No[place]'s wort, should not be taken with anticoagulants  Some may prevent anticoagulants from working, and others may increase your risk for bleeding  Self-care:   · Do not play contact sports  This may increase your risk for bleeding if you get injured or hit   Walk, swim, or do yoga to get exercise  Ask your healthcare provider about other exercises that are safe  · Use an electric razor to shave  This may prevent cuts that could bleed  · Use a soft bristled tooth brush and wax floss  This may prevent bleeding from your gums  · Prevent falls in and out of your home  Wear non-slip shoes or slippers when you are out of bed  Keep walkways clear  Remove throw rugs and other objects that can cause you to trip and fall  Use assistive devices as directed  · Be careful with sharp objects  Wear gloves when you work outside with sharp tools or in the garden  · Use contraception to prevent a pregnancy  Anticoagulants can cause problems with your baby, and dangerous bleeding during pregnancy  · Carry your medicine with you when you travel  This will prevent you from missing a dose of medicine if your bag gets lost  Talk to your healthcare provider before you travel  · Wear or carry a medical alert bracelet or necklace at all times  This will alert healthcare providers that you take an anticoagulant if you are unconscious or need emergency medical treatment  Other important information:   · Tell all of your healthcare providers and dentist that you take a blood thinner  This will help them plan for procedures and surgeries  It will also prevent them from giving you medicine that may interact with your blood thinner  · You should not take antiplatelet medicine  if you have liver or kidney disease, a peptic ulcer, or gastrointestinal disease  You should also not take this medicine if you have a bleeding disorder, uncontrolled asthma, or uncontrolled high blood pressure  These conditions may increase your risk for bleeding  · Take anticoagulants exactly as prescribed  Do not double up on a dose if you have missed a dose  Some anticoagulants should be taken at the same time every day  · Keep appointments for blood tests  if you are taking warfarin   Blood tests will help your healthcare provider make changes to your dose of anticoagulants so that they work correctly  The blood test will measure the amount of time it takes for your blood to clot  This is called the international normalized ratio (INR)  If your INR is too high, you may be at an increased risk for bleeding  If your INR is too low, you may be at an increased risk for blood clots  Your healthcare provider may change the dose of your anticoagulant medicine depending on the results of your blood test   Follow up with your healthcare provider as directed:  Write down your questions so you remember to ask them during your visits  © 2017 [facility] Information is for End User's use only and may not be sold, redistributed or otherwise used for commercial purposes  All illustrations and images included in CareNotes® are the copyrighted property of [employer] , [employer]  or Ronald King  The above information is an  only  It is not intended as medical advice for individual conditions or treatments  Talk to your doctor, nurse or pharmacist before following any medical regimen to see if it is safe and effective for you

## 2018-12-20 NOTE — TELEPHONE ENCOUNTER
PT DAUGHTER CALLED BACK BOTH LEGS ARE SWOLLEN BUT THE LEG THAT HAS BEEN OPERATED ON IS NOW VERY SWOLLEN AND BRUISING   SKIN IS NOT HOT TO THE TOUCH  PATIENT WOULD LIKE FOR THE DR TO KNOW SHE IS NOW RUSHING HER MOTHER TO [address]  PT DAUGHTER WOULD STILL LIKE A CALL BACK

## 2018-12-20 NOTE — CONSULTS
Consultation - Cardiology Team One  Evert Bryson 68 y o  female MRN: [id number]  Unit/Bed#: ED 06 Encounter: [id number]    Consults    Physician Requesting Consult: Godwin Cruz MD  Reason for Consult / Principal Problem: edema    HPI: Cardiologist Dr Gardenia Schlatter is a 68y o  year old female who has a history of CAD, PAD status post recent stenting, hypertension, hyperlipidemia, CKD, AFib/flutter  Patient came to the emergency room because of lower extremity swelling  The right leg is worse than the left  Patient's daughter also noted bruising at the back of the calf  Patient's daughter was scared given mother's extensive PVD history  Patient was recently [facility] at [facility] for planned outpatient lower extremity angiogram with possible intervention; intervention was attempted but stent fractured  Patient required emergent transfer to [place], where she had placement of bilateral common iliac artery stents, right external iliac artery stents  Patient had been feeling good since being discharged just a couple of days ago  The daughter states she was taking all medications as prescribed  She does admit that she was on a bus yesterday for several hours with her legs dangling  She has no real pain in the legs  She denies any chest pain  She denies any shortness of breath  Patient's INR was therapeutic just a couple of days ago      REVIEW OF SYSTEMS:  Constitutional:  Denies fever or chills   Eyes:  Denies change in visual acuity   HENT:  Denies nasal congestion or sore throat   Respiratory:  Denies cough or shortness of breath   Cardiovascular: + edema Denies chest pain  GI:  Denies abdominal pain, nausea, vomiting, bloody stools or diarrhea   :  Denies dysuria, frequency, difficulty in micturition and nocturia  Musculoskeletal:  Denies back pain or joint pain   Neurologic:  Denies headache, focal weakness or sensory changes   Endocrine:  Denies polyuria or polydipsia   Lymphatic: Denies swollen glands   Psychiatric:  Denies depression or anxiety     Historical Information   Past Medical History:   Diagnosis Date    [facility]rial fibrillation ([facility] )     Brain aneurysm     CAD (coronary artery disease)     Cardiac disease     had stents 12 years ago in [facility]    CH[facility]congestive [facility] failure) [facility]     CKD (chronic kidney disease)     COPD (chronic obstructive pulmonary disease) (HCC)     Dementia     Dementia     Disease of thyroid gland     GERD (gastroesophageal reflux disease)     Hyperlipidemia     Hyperlipidemia     Hypertension     MI (myocardial infarction) ([place] [address] )     Migraine     Renal disorder     Seizures ([place] [address] )     Stroke [facility])      Past Surgical History:   Procedure Laterality Date    APPENDECTOMY      ARTERIOGRAM N/A 12/7/2018    Procedure: ARTERIOGRAM WITH STENT PLACEMENT;  Surgeon: Keven Reddy MD;  Location: [facility];  Service: Vascular    BLADDER TUMOR EXCISION      BRAIN SURGERY      1998  clip right frontal lobe    CARDIAC SURGERY      COLONOSCOPY      CORONARY STENT PLACEMENT      5 stents    EYE SURGERY      IR ABDOMINAL ANGIOGRAPHY / INTERVENTION  12/7/2018    MANDIBLE FRACTURE SURGERY      TONSILLECTOMY      TUBAL LIGATION      UTERINE FIBROID SURGERY       History   Alcohol Use No     History   Drug Use No     History   Smoking Status    Former Smoker    Quit date: 9/7/2007   Smokeless Tobacco    Never Used       Family History:   Family History   Problem Relation Age of Onset    Heart disease Father     Parkinsonism Father     Macular degeneration Mother     Glaucoma Mother     Diabetes Brother     Heart disease Brother        MEDS & ALLERGIES:  all current active meds have been reviewed and current meds: No current facility-administered medications for this encounter  No current facility-administered medications for this encounter     Allergies   Allergen Reactions    Spiriva Handihaler [Tiotropium Bromide Monohydrate] Swelling    Ramipril Facial Swelling    Penicillins      unsure       OBJECTIVE:  Vitals:   Vitals:    12/20/18 1545   BP: 158/71   Pulse: 60   Resp: 16   SpO2: 92%     Body mass index is 36 08 kg/m²  Systolic (73AHX), MYL:644 , Min:158 , WOV:016     Diastolic (85JPJ), YNC:56, Min:71, Max:71    No intake or output data in the 24 hours ending 12/20/18 1700  Weight (last 2 days)     Date/Time   Weight    12/20/18 1545  83 8 (184 75)            Invasive Devices     Peripheral Intravenous Line            Peripheral IV 12/14/18 Right Hand 6 days          Drain            Urethral Catheter Double-lumen 20 Fr  -- days                PHYSICAL EXAMS:  General:  Patient is not in acute distress, laying in the bed comfortably, awake, alert responding to commands  Head: Normocephalic, Atraumatic  HEENT: White sclera, pink conjunctiva,  PERRLA,pharynx benign  Neck:  Supple, no neck vein distention, carotids+2/+2 no bruits, thyromegaly, adenopathy  Respiratory: clear to P/A  Cardiovascular:  PMI normal, S1-S2 normal, +3/6 murmur  Regular rhythm  GI:  Abdomen soft nontender  No hepatosplenomegaly, adenopathy, ascites,or rebound tenderness  Extremities: +edema, right worse than left  Warm legs bilaterally  Varicose veins bilaterally  Hematoma noted to right posterior leg /calf area  No erythema     Integument:  No skin rashes or ulceration  Lymphatic:  No cervical or inguinal lymphadenopathy  Neurologic:  Patient is awake alert, responding to command, well-oriented to time and place and person moving all extremities    LABORATORY RESULTS:      CBC with diff:   Results from last 7 days  Lab Units 12/20/18  1619   WBC Thousand/uL 6 13   HEMOGLOBIN g/dL 9 5*   HEMATOCRIT % 30 3*   MCV fL 105*   PLATELETS Thousands/uL 256   MCH pg 32 8   MCHC g/dL 31 4   RDW % 13 6   MPV fL 11 3   NRBC AUTO /100 WBCs 0       CMP:  Results from last 7 days  Lab Units 12/20/18  1619 12/14/18  0511   POTASSIUM mmol/L 4 1 4 0   CHLORIDE mmol/L 107 109*   CO2 mmol/L 29 27   BUN mg/dL 33* 23   CREATININE mg/dL 1 46* 1 26   CALCIUM mg/dL 9 9 9 4   EGFR ml/min/1 73sq m 36 43       BMP:  Results from last 7 days  Lab Units 18  1619 18  0511   POTASSIUM mmol/L 4 1 4 0   CHLORIDE mmol/L 107 109*   CO2 mmol/L 29 27   BUN mg/dL 33* 23   CREATININE mg/dL 1 46* 1 26   CALCIUM mg/dL 9 9 9 4                          Results from last 7 days  Lab Units 18  1619 18  0945 18  0511   INR  1 91* 2 21* 2 78*       Lipid Profile:   No results found for: CHOL  No results found for: HDL  No results found for: LDLCALC  No results found for: TRIG    Cardiac testing:   Results for orders placed during the hospital encounter of 12/10/17   Echo complete with contrast if indicated    Narrative [address] [place], [place] [id number] [phone]    Transthoracic Echocardiogram  2D, M-mode, Doppler, and Color Doppler    Study date:  11-Dec-2017    Patient: Petrona Dobbs  MR number: [id number]  Account number: [de-identified]  : 1945  Age: 71 years  Gender: Female  Status: Inpatient  Location: Bedside  Height: 60 in  Weight: 192 lb  BP: 99/ 50 mmHg    Indications: Shortness of breath  Diagnoses: R06 00 - Dyspnea, unspecified, R06 02 - Shortness of breath    Sonographer:  Eileen Polo  Interpreting Physician:  Suzie Mcdermott MD  Referring Physician:  Ciara Mills PA-C  Group:  [facility]    SUMMARY    LEFT VENTRICLE:  Systolic function was normal  Ejection fraction was estimated in the range of 55 % to 65 %  There were no regional wall motion abnormalities  There was mild concentric hypertrophy  Doppler parameters were consistent with abnormal left ventricular relaxation (grade 1 diastolic dysfunction)  MITRAL VALVE:  There was mild annular calcification  There was mild regurgitation  AORTIC VALVE:  There was mild regurgitation      TRICUSPID VALVE:  There was mild regurgitation  PULMONIC VALVE:  There was mild regurgitation  HISTORY: PRIOR HISTORY: Elevated trponin, Dementia, Seizures, Stroke, Myocardial infarction  Congestive heart failure  Risk factors: hypertension and hypercholesterolemia  Chronic lung disease  PROCEDURE: The procedure was performed at the bedside  This was a routine study  The transthoracic approach was used  The study included complete 2D imaging, M-mode, complete spectral Doppler, and color Doppler  The heart rate was 68 bpm,  at the start of the study  Images were obtained from the parasternal, apical, subcostal, and suprasternal notch acoustic windows  Echocardiographic views were limited due to poor acoustic window availability, decreased penetration, and  lung interference  This was a technically difficult study  LEFT VENTRICLE: Size was normal  Systolic function was normal  Ejection fraction was estimated in the range of 55 % to 65 %  There were no regional wall motion abnormalities  Wall thickness was normal  There was mild concentric  hypertrophy  DOPPLER: Doppler parameters were consistent with abnormal left ventricular relaxation (grade 1 diastolic dysfunction)  RIGHT VENTRICLE: The size was normal  Systolic function was normal  Wall thickness was normal     LEFT ATRIUM: Size was normal     RIGHT ATRIUM: Size was normal     MITRAL VALVE: There was mild annular calcification  Valve structure was normal  There was normal leaflet separation  DOPPLER: The transmitral velocity was within the normal range  There was no evidence for stenosis  There was mild  regurgitation  AORTIC VALVE: The valve was trileaflet  Leaflets exhibited normal thickness and normal cuspal separation  DOPPLER: Transaortic velocity was within the normal range  There was no evidence for stenosis  There was mild regurgitation  TRICUSPID VALVE: The valve structure was normal  There was normal leaflet separation   DOPPLER: The transtricuspid velocity was within the normal range  There was no evidence for stenosis  There was mild regurgitation  PULMONIC VALVE: Leaflets exhibited normal thickness, no calcification, and normal cuspal separation  DOPPLER: The transpulmonic velocity was within the normal range  There was mild regurgitation  PERICARDIUM: There was no pericardial effusion  The pericardium was normal in appearance  AORTA: The root exhibited normal size  SYSTEM MEASUREMENT TABLES    2D  %FS: 29 2 %  Ao Diam: 3 1 cm  EDV(Teich): 140 8 ml  EF(Teich): 55 6 %  ESV(Teich): 62 5 ml  IVSd: 1 1 cm  LA Area: 20 9 cm2  LA Diam: 3 7 cm  LVEDV MOD A4C: 117 1 ml  LVEF MOD A4C: 50 3 %  LVESV MOD A4C: 58 2 ml  LVIDd: 5 4 cm  LVIDs: 3 8 cm  LVLd A4C: 8 5 cm  LVLs A4C: 7 1 cm  LVPWd: 1 1 cm  RA Area: 17 5 cm2  RVIDd: 3 2 cm  SV MOD A4C: 59 ml  SV(Teich): 78 3 ml    CW  AR Dec [place]: 1 9 m/s2  AR Dec Time: 2208 1 ms  AR PHT: 640 4 ms  AR Vmax: 4 1 m/s  AR maxP 4 mmHg  TR Vmax: 2 5 m/s  TR maxP mmHg    MM  TAPSE: 1 4 cm    PW  AVC: 389 7 ms  E': 0 1 m/s  E/E': 15 3  MV A Anoop: 1 m/s  MV Dec [place]: 3 4 m/s2  MV DecT: 240 3 ms  MV E Anoop: 0 8 m/s  MV E/A Ratio: 0 8  MV PHT: 69 7 ms  MVA By PHT: 3 2 cm2    [employer] Accredited Echocardi[facility]    Prepared and electronically signed by    Clara Rodgers MD  Signed 11-Dec-2017 12:09:00       No results found for this or any previous visit  No procedure found  No results found for this or any previous visit  Imaging:   I have personally reviewed pertinent reports  Assessment/Plan:  1-Lower extremity edema, likely multifactorial, chronic venous diease/ varicose veins/baca bag tied to that leg  Pt advised to elevate legs whenever possible  Wear compressions stockings  Advise to wear baca leg bag on other leg  Consider venous US to r/o DVT  Continue all medications  Continue Coumadin      2-peripheral arterial disease with recent lower extremity stenting  Continue all medications  Patient's legs are warm  No evidence of ischemia  3-CAD, stable  Without anginal symptoms  4-atrial fibrillation/flutter  INR is 1 9  Continue all medications  Discussed plan with daughter as well as patient  Also discussed plan with ER attending Dr Flavia Basurto  Code Status: Prior    Counseling / Coordination of Care  Total floor / unit time spent today 35 minutes  Greater than 50% of total time was spent with the patient and / or family counseling and / or coordination of care  A description of the counseling / coordination of care: Review of history, current assessment, development of a plan      Kevin Anderson PA-C  12/20/2018,5:00 PM

## 2018-12-20 NOTE — TELEPHONE ENCOUNTER
Pt daughter Gabino Can would like to discuss some things going on with her mother(leg swelling), she did have a procedure on 58/94 with some complications  Gabino Can would like call back from Dr Vickie Gonzalez himself

## 2018-12-27 ENCOUNTER — OFFICE VISIT (OUTPATIENT)
Dept: CARDIOLOGY CLINIC | Facility: CLINIC | Age: 73
End: 2018-12-27
Payer: MEDICARE

## 2018-12-27 VITALS
DIASTOLIC BLOOD PRESSURE: 70 MMHG | HEART RATE: 60 BPM | OXYGEN SATURATION: 93 % | HEIGHT: 60 IN | SYSTOLIC BLOOD PRESSURE: 140 MMHG | WEIGHT: 181 LBS | BODY MASS INDEX: 35.53 KG/M2

## 2018-12-27 DIAGNOSIS — I10 ESSENTIAL HYPERTENSION: ICD-10-CM

## 2018-12-27 DIAGNOSIS — I50.33 ACUTE ON CHRONIC DIASTOLIC CONGESTIVE HEART FAILURE (HCC): ICD-10-CM

## 2018-12-27 DIAGNOSIS — E78.2 MIXED HYPERLIPIDEMIA: ICD-10-CM

## 2018-12-27 DIAGNOSIS — R06.02 SOB (SHORTNESS OF BREATH): ICD-10-CM

## 2018-12-27 DIAGNOSIS — I73.9 PERIPHERAL ARTERY DISEASE (HCC): ICD-10-CM

## 2018-12-27 DIAGNOSIS — I48.92 ATRIAL FLUTTER, UNSPECIFIED TYPE (HCC): ICD-10-CM

## 2018-12-27 DIAGNOSIS — R60.0 LOWER EXTREMITY EDEMA: ICD-10-CM

## 2018-12-27 DIAGNOSIS — I48.0 PAROXYSMAL ATRIAL FIBRILLATION (HCC): Primary | ICD-10-CM

## 2018-12-27 PROCEDURE — 99214 OFFICE O/P EST MOD 30 MIN: CPT | Performed by: INTERNAL MEDICINE

## 2018-12-27 RX ORDER — FUROSEMIDE 40 MG/1
40 TABLET ORAL DAILY
Qty: 30 TABLET | Refills: 11 | Status: SHIPPED | OUTPATIENT
Start: 2018-12-27 | End: 2019-10-25 | Stop reason: SDUPTHER

## 2018-12-27 NOTE — PROGRESS NOTES
Cardiology Consultation     Debora Madrigal  [id number]  1945  [address] [facility],Third [address] [place] [address]    C/c:  ER follow-up for lower extremity edema and PAD    HPI:69 y o  year old female who has a history of CAD, PAD status post recent stenting, hypertension, hyperlipidemia, CKD, paroxysmal AFib/flutter is here for follow-up  patient was recently seen in the ER for right lower extremity edema  Patient appeared to have a large hematoma where the Davis was tied  Since discharge the right leg swelling has improved  Patient does complain of shortness of breath on exertion  Patient has obstructive sleep apnea and she is not using a CPAP machine at this time  She is compliant with the medications and take Lasix  No significant weight gain recently  Patient is not compliant with her salt restriction at this time  She denies having any chest pain      Patient Active Problem List   Diagnosis    HTN (hypertension)    COPD (chronic obstructive pulmonary disease) (HCC)    Chronic respiratory failure (HCC)    Atrial flutter (HCC)    Back pain    Dementia    Hyperlipidemia    UTI (urinary tract infection)    Seizure disorder ([facility] )    RSV infection    Ambulatory dysfunction    Hypernatremia    Stage 3 chronic kidney disease (HCC)    Bilateral leg edema    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    JESENIA (obstructive sleep apnea)    Intermittent claudication of right lower extremity due to atherosclerosis (HCC)    Atrial fibrillation (HCC)    SOB (shortness of breath)    Chronic indwelling Davis catheter    Bradycardia    Chronic hypoxemic respiratory failure ([facility] )    Essential hypertension    Peripheral artery disease (HCC)    Hematoma    Contrast dye induced nephropathy    Macrocytic anemia     Past Medical History:   Diagnosis Date    Atrial fibrillation ([facility] )  Brain aneurysm     CAD (coronary artery disease)     Cardiac disease     had stents 12 years ago [place] [id number] [address] CHF (congestive heart failure) [facility]     CKD (chronic kidney disease)     COPD (chronic obstructive pulmonary disease) ([facility]     Dementia     Dementia     Disease of thyroid gland     GERD (gastroesophageal reflux disease)     Hyperlipidemia     Hyperlipidemia     Hypertension     [facility]     Migraine     Renal disorder     Seizures ([facility] )     Stroke ([place] [address] )      Social History     Social History    Marital status:      Spouse name: N/A    Number of children: 3    Years of education: N/A     Occupational History    retired      Social History Main Topics    Smoking status: Former Smoker     Quit date: 9/7/2007    Smokeless tobacco: Never Used    Alcohol use No    Drug use: No    Sexual activity: Not on file     Other Topics Concern    Not on file     Social History Narrative    No narrative on file      Family History   Problem Relation Age of Onset    Heart disease Father     Parkinsonism Father     Macular degeneration Mother     Glaucoma Mother     Diabetes Brother     Heart disease Brother      Past Surgical History:   Procedure Laterality Date    APPENDECTOMY      ARTERIOGRAM N/A 12/7/2018    Procedure: ARTERIOGRAM WITH STENT PLACEMENT;  Surgeon: Destini Reddy MD;  [facility];  Service: Vascular    BLADDER TUMOR EXCISION      BRAIN SURGERY      1998  clip right frontal lobe    CARDIAC SURGERY      COLONOSCOPY      CORONARY STENT PLACEMENT      5 stents    EYE SURGERY      IR ABDOMINAL ANGIOGRAPHY / INTERVENTION  12/7/2018    MANDIBLE FRACTURE SURGERY      TONSILLECTOMY      TUBAL LIGATION      UTERINE FIBROID SURGERY         Current Outpatient Prescriptions:     albuterol (2 5 mg/3 mL) 0 083 % nebulizer solution, Take 1 vial (2 5 mg total) by nebulization every 6 (six) hours as needed for wheezing, Disp: 1 vial, Rfl: 0    albuterol (PROVENTIL HFA,VENTOLIN HFA) 90 mcg/act inhaler, Inhale 2 puffs every 6 (six) hours as needed for wheezing, Disp: 1 Inhaler, Rfl: 0    allopurinol (ZYLOPRIM) 300 mg tablet, Take 1 tablet (300 mg total) by mouth every morning for 14 days, Disp: 14 tablet, Rfl: 0    atorvastatin (LIPITOR) 10 mg tablet, Take 1 tablet (10 mg total) by mouth daily, Disp: 30 tablet, Rfl: 0    buPROPion (WELLBUTRIN SR) 150 mg 12 hr tablet, Take 1 tablet (150 mg total) by mouth daily, Disp: 30 tablet, Rfl: 0    carBAMazepine (TEGretol) 100 mg chewable tablet, Chew 1 tablet (100 mg total) 3 (three) times a day, Disp: 90 tablet, Rfl: 0    Cholecalciferol (VITAMIN D) 2000 units CAPS, Take 1 capsule (2,000 Units total) by mouth daily, Disp: 14 capsule, Rfl: 0    clopidogrel (PLAVIX) 75 mg tablet, Take 1 tablet (75 mg total) by mouth daily, Disp: 90 tablet, Rfl: 0    diltiazem (CARDIZEM SR) 90 mg 12 hr capsule, Take 2 capsules (180 mg total) by mouth every 12 (twelve) hours, Disp: 60 capsule, Rfl: 0    divalproex sodium (DEPAKOTE ER) 250 mg 24 hr tablet, Take 1 tablet (250 mg total) by mouth daily, Disp: 30 tablet, Rfl: 0    fenofibrate micronized (LOFIBRA) 67 MG capsule, Take 1 capsule (67 mg total) by mouth daily with breakfast, Disp: 15 capsule, Rfl: 0    ferrous sulfate 325 (65 Fe) mg tablet, Take 1 tablet (325 mg total) by mouth 2 (two) times a day, Disp: 30 tablet, Rfl: 0    fluticasone-vilanterol (BREO ELLIPTA) 100-25 mcg/inh inhaler, Inhale 1 puff daily, Disp: 1 Inhaler, Rfl: 0    furosemide (LASIX) 20 mg tablet, Take 1 tablet (20 mg total) by mouth daily, Disp: 15 tablet, Rfl: 0    levothyroxine 88 mcg tablet, Take 1 tablet (88 mcg total) by mouth daily, Disp: 14 tablet, Rfl: 0    metoprolol tartrate (LOPRESSOR) 50 mg tablet, Take 1 tablet (50 mg total) by mouth every 12 (twelve) hours, Disp: 60 tablet, Rfl: 0    nitroglycerin (NITROSTAT) 0 4 mg SL tablet, Place 1 tablet (0 4 mg total) under the tongue every 5 (five) minutes as needed for chest pain, Disp: 90 tablet, Rfl: 0    OXYGEN-HELIUM IN, Inhale, Disp: , Rfl:     potassium chloride (K-DUR,KLOR-CON) 20 mEq tablet, Take 1 tablet (20 mEq total) by mouth daily, Disp: 15 tablet, Rfl: 0    Sennosides-Docusate Sodium (SENNA PLUS PO), Take by mouth daily as needed, Disp: , Rfl:     warfarin (COUMADIN) 3 mg tablet, One tablet daily or as otherwise directed , Disp: 30 tablet, Rfl: 0    acetaminophen (TYLENOL) 500 mg tablet, Take 500 mg by mouth every 6 (six) hours as needed for mild pain, Disp: , Rfl:     Citric Lk-Rilhxteeeux-Jz Carb (RENACIDIN) SOLN, Indications: 60ml to irrigate baca once a week, Disp: , Rfl:     Probiotic Product (ACIDOPHILUS/BIFIDUS PO), Take by mouth, Disp: , Rfl:   Allergies   Allergen Reactions    Spiriva Handihaler [Tiotropium Bromide Monohydrate] Swelling    Ramipril Facial Swelling    Penicillins      unsure     Vitals:    12/27/18 1418   BP: 140/70   BP Location: Left arm   Patient Position: Sitting   Cuff Size: Adult   Pulse: 60   SpO2: 93%   Weight: 82 1 kg (181 lb)   Height: 5' (1 524 m)       Labs:  Admission on 12/20/2018, Discharged on 12/20/2018   Component Date Value    Sodium 12/20/2018 145     Potassium 12/20/2018 4 1     Chloride 12/20/2018 107     CO2 12/20/2018 29     ANION GAP 12/20/2018 9     BUN 12/20/2018 33*    Creatinine 12/20/2018 1 46*    Glucose 12/20/2018 131     Calcium 12/20/2018 9 9     eGFR 12/20/2018 36     WBC 12/20/2018 6 13     RBC 12/20/2018 2 90*    Hemoglobin 12/20/2018 9 5*    Hematocrit 12/20/2018 30 3*    MCV 12/20/2018 105*    MCH 12/20/2018 32 8     MCHC 12/20/2018 31 4     RDW 12/20/2018 13 6     MPV 12/20/2018 11 3     Platelets 49/01/5089 256     nRBC 12/20/2018 0     Neutrophils Relative 12/20/2018 58     Immat GRANS % 12/20/2018 1     Lymphocytes Relative 12/20/2018 28     Monocytes Relative 12/20/2018 8     Eosinophils Relative 12/20/2018 4  Basophils Relative 12/20/2018 1     Neutrophils Absolute 12/20/2018 3 59     Immature Grans Absolute 12/20/2018 0 07     Lymphocytes Absolute 12/20/2018 1 69     Monocytes Absolute 12/20/2018 0 49     Eosinophils Absolute 12/20/2018 0 26     Basophils Absolute 12/20/2018 0 03     Protime 12/20/2018 21 7*    INR 12/20/2018 1 91*    PTT 12/20/2018 34    Appointment on 12/17/2018   Component Date Value    Protime 12/17/2018 24 2*    INR 12/17/2018 2 21*   Admission on 12/07/2018, Discharged on 12/14/2018   Component Date Value    WBC 12/07/2018 12 96*    RBC 12/07/2018 3 30*    Hemoglobin 12/07/2018 11 0*    Hematocrit 12/07/2018 34 0*    MCV 12/07/2018 103*    MCH 12/07/2018 33 3     MCHC 12/07/2018 32 4     RDW 12/07/2018 12 9     Platelets 85/59/4381 137*    MPV 12/07/2018 12 0     ABO Grouping 12/07/2018 A     Rh Factor 12/07/2018 Positive     Antibody Screen 12/07/2018 Negative     Specimen Expiration Date 12/07/2018 [id number]     Sodium 12/07/2018 145     Potassium 12/07/2018 3 7     Chloride 12/07/2018 112*    CO2 12/07/2018 24     ANION GAP 12/07/2018 9     BUN 12/07/2018 32*    Creatinine 12/07/2018 1 10     Glucose 12/07/2018 166*    Calcium 12/07/2018 8 2*    eGFR 12/07/2018 50     Protime 12/08/2018 15 7*    INR 12/08/2018 1 24*    Sodium 12/08/2018 143     Potassium 12/08/2018 4 0     Chloride 12/08/2018 110*    CO2 12/08/2018 27     ANION GAP 12/08/2018 6     BUN 12/08/2018 29*    Creatinine 12/08/2018 1 09     Glucose 12/08/2018 122     Calcium 12/08/2018 9 3     eGFR 12/08/2018 51     WBC 12/08/2018 11 58*    RBC 12/08/2018 2 99*    Hemoglobin 12/08/2018 10 0*    Hematocrit 12/08/2018 31 5*    MCV 12/08/2018 105*    MCH 12/08/2018 33 4     MCHC 12/08/2018 31 7     RDW 12/08/2018 13 2     Platelets 08/30/2754 137*    MPV 12/08/2018 12 0     POC Glucose 12/08/2018 120     Protime 12/10/2018 17 3*    INR 12/10/2018 1 40*    WBC 12/11/2018 5 99  RBC 12/11/2018 2 64*    Hemoglobin 12/11/2018 8 7*    Hematocrit 12/11/2018 27 5*    MCV 12/11/2018 104*    MCH 12/11/2018 33 0     MCHC 12/11/2018 31 6     RDW 12/11/2018 13 0     MPV 12/11/2018 12 8*    Platelets 20/96/8795 112*    nRBC 12/11/2018 0     Neutrophils Relative 12/11/2018 54     Immat GRANS % 12/11/2018 1     Lymphocytes Relative 12/11/2018 28     Monocytes Relative 12/11/2018 13*    Eosinophils Relative 12/11/2018 4     Basophils Relative 12/11/2018 0     Neutrophils Absolute 12/11/2018 3 22     Immature Grans Absolute 12/11/2018 0 06     Lymphocytes Absolute 12/11/2018 1 70     Monocytes Absolute 12/11/2018 0 77     Eosinophils Absolute 12/11/2018 0 22     Basophils Absolute 12/11/2018 0 02     Sodium 12/11/2018 144     Potassium 12/11/2018 3 5     Chloride 12/11/2018 106     CO2 12/11/2018 30     ANION GAP 12/11/2018 8     BUN 12/11/2018 21     Creatinine 12/11/2018 1 18     Glucose 12/11/2018 104     Calcium 12/11/2018 8 9     eGFR 12/11/2018 46     Protime 12/11/2018 18 0*    INR 12/11/2018 1 48*    WBC 12/11/2018 6 72     RBC 12/11/2018 2 73*    Hemoglobin 12/11/2018 9 3*    Hematocrit 12/11/2018 28 5*    MCV 12/11/2018 104*    MCH 12/11/2018 34 1     MCHC 12/11/2018 32 6     RDW 12/11/2018 13 2     Platelets 73/73/5078 124*    MPV 12/11/2018 12 3     WBC 12/12/2018 6 00     RBC 12/12/2018 2 64*    Hemoglobin 12/12/2018 8 7*    Hematocrit 12/12/2018 27 7*    MCV 12/12/2018 105*    MCH 12/12/2018 33 0     MCHC 12/12/2018 31 4     RDW 12/12/2018 13 2     MPV 12/12/2018 12 6     Platelets 26/21/6193 133*    nRBC 12/12/2018 0     Neutrophils Relative 12/12/2018 54     Immat GRANS % 12/12/2018 1     Lymphocytes Relative 12/12/2018 27     Monocytes Relative 12/12/2018 14*    Eosinophils Relative 12/12/2018 4     Basophils Relative 12/12/2018 0     Neutrophils Absolute 12/12/2018 3 25     Immature Grans Absolute 12/12/2018 0 06     Lymphocytes Absolute 12/12/2018 1 62     Monocytes Absolute 12/12/2018 0 81     Eosinophils Absolute 12/12/2018 0 24     Basophils Absolute 12/12/2018 0 02     Sodium 12/12/2018 142     Potassium 12/12/2018 3 5     Chloride 12/12/2018 107     CO2 12/12/2018 28     ANION GAP 12/12/2018 7     BUN 12/12/2018 23     Creatinine 12/12/2018 1 28     Glucose 12/12/2018 116     Calcium 12/12/2018 9 8     eGFR 12/12/2018 42     Protime 12/12/2018 23 8*    INR 12/12/2018 2 12*    TSH 3RD GENERATON 12/12/2018 2 660     Vitamin B-12 12/12/2018 293     Folate 12/12/2018 18 5*    Protime 12/12/2018 25 2*    INR 12/12/2018 2 28*    Protime 12/13/2018 28 2*    INR 12/13/2018 2 64*    Sodium 12/13/2018 147*    Potassium 12/13/2018 4 0     Chloride 12/13/2018 109*    CO2 12/13/2018 31     ANION GAP 12/13/2018 7     BUN 12/13/2018 30*    Creatinine 12/13/2018 1 38*    Glucose 12/13/2018 102     Calcium 12/13/2018 9 5     eGFR 12/13/2018 38     Sodium 12/14/2018 144     Potassium 12/14/2018 4 0     Chloride 12/14/2018 109*    CO2 12/14/2018 27     ANION GAP 12/14/2018 8     BUN 12/14/2018 23     Creatinine 12/14/2018 1 26     Glucose 12/14/2018 98     Calcium 12/14/2018 9 4     eGFR 12/14/2018 43     Protime 12/14/2018 29 4*    INR 12/14/2018 2 78*   Hospital Outpatient Visit on 12/07/2018   Component Date Value    Protime 12/07/2018 14 8*    INR 12/07/2018 1 17     Activated Clotting Time,* 12/07/2018 230*    Specimen Type 12/07/2018 ARTERIAL     Activated Clotting Time,* 12/07/2018 254*    Specimen Type 12/07/2018 ARTERIAL     Activated Clotting Time,* 12/07/2018 236*    Specimen Type 12/07/2018 ARTERIAL    Office Visit on 11/08/2018   Component Date Value    WBC 11/30/2018 4 63     RBC 11/30/2018 3 80*    Hemoglobin 11/30/2018 12 7     Hematocrit 11/30/2018 40 4     MCV 11/30/2018 106*    MCH 11/30/2018 33 4     MCHC 11/30/2018 31 4     RDW 11/30/2018 13 1     MPV 11/30/2018 12 1     Platelets 53/72/2122 138*    nRBC 11/30/2018 0     Neutrophils Relative 11/30/2018 51     Immat GRANS % 11/30/2018 0     Lymphocytes Relative 11/30/2018 35     Monocytes Relative 11/30/2018 10     Eosinophils Relative 11/30/2018 4     Basophils Relative 11/30/2018 0     Neutrophils Absolute 11/30/2018 2 31     Immature Grans Absolute 11/30/2018 0 02     Lymphocytes Absolute 11/30/2018 1 62     Monocytes Absolute 11/30/2018 0 46     Eosinophils Absolute 11/30/2018 0 20     Basophils Absolute 11/30/2018 0 02      Imaging: Vas Lower Limb Arterial Duplex, Complete Bilateral    Result Date: 12/10/2018  Narrative:  [facility] REPORT CLINICAL: Indications: Initial Post-op evaluation s/p revascularization  Patient complains of not being able to walk  Operative History: 2018-12-07  " kissing " common iliac stents 2018-12-07 Right EIA Stent Cardiac Stents Risk Factors The patient has history of Obesity, HTN, PAD and CAD  She has no history of Diabetes  Clinical Right Pressure:  136/ mm Hg, Left Pressure:  136/ mm Hg  FINDINGS:  Segment                Right                Left                                          Waveform        PSV  Waveform        PSV  Post  Tibial           Biphasic             Biphasic             Ant  Tibial            Biphasic             Biphasic             Peroneal               Not Identified       Not Identified       Common Femoral Artery                  168                  154  Prox Profunda                          161                   75  Prox SFA                               184                  193  Mid SFA                                128                  107  Dist SFA                               106                  108  Proximal Pop                           155                   55  Distal Pop                              80                   55  Dist Post Tibial                        29                   75  Dist  Ant   Tibial 59                   90     CONCLUSION: Impression: Findings suggestive of aortoiliac disease  RIGHT LOWER LIMB: Mild - Moderate diffused disease throughout the femoral and popliteal arteries  Ankle/Brachial index: 0 77, Prior: 0 54 PVR/ PPG tracings are dampened  Metatarsal pressure of 89mmHg Great toe pressure of 78mmHg, within the healing range  LEFT LOWER LIMB: Mild - Moderate diffused disease throughout the femoral and popliteal arteries  Ankle/Brachial index:  0 99, Prior 0 74 PVR/ PPG tracings are dampened  Metatarsal pressure of 112mmHg Great toe pressure of 117mmHg, within the healing range  In comparison to the study of 06/01/2018, there is a bilateral improvement in the ANABEL  SIGNATURE: Electronically Signed by: Taylor Bermudez MD on 2018-12-10 01:53:10 PM    Ir Abdominal Angiography / Intervention    Result Date: 12/8/2018  Narrative: Abdominal aortogram with bilateral iliac stenting Clinical History:  Patient status post arteriogram performed  by another provider which resulted in a displaced stent originally intended for the right external iliac artery  Patient now has stent overriding the contralateral common iliac artery and presents for corrective intervention  Patient has pre-existing bilateral femoral access  Fluoro time: 30 6 minutes Number of Images: 984 Operators: Due to the complexity of the procedure, the need to simultaneously manipulate multiple intra arterial devises, and the elevated risk of atheroemboli, proper performance of the procedure required both a vascular surgeon, a cardiothoracic surgeon and an interventional radiologist to be present  The cosurgeons were Dr Dina Sanchez and Doctor Caroline Collins  There was no qualified resident available to assist  Contrast: 44 mL Visipaque 320 Procedure and Findings: Using general anesthesia and monitoring by the department of anesthesia, the existing sheaths were prepped and draped in usual sterile fashion    Evaluation of the pre-existing stent demonstrates that it extends from the mid external iliac artery on the right up into the aorta, and is overriding the left common iliac artery  The stent appears folded on initial evaluation  Contrast was injected into the right femoral sheath, and digital subtraction imaging was obtained, demonstrating the existing sheath sitting in a dissection of the right external iliac artery which extends up into the common iliac artery  The internal iliac artery remains patent  The existing sheath on the left is a longer sheath, and its tip is located in the common iliac artery  Contrast was injected into the sheath, demonstrating a dissection of the distal abdominal aorta which begins just below a focal area of ectasia, and extends down into the proximal left common iliac artery, and is contiguous with the dissection of the right common and external iliac artery  Via the right common femoral sheath, a Kumpe catheter and J-tipped Glidewire were advanced into the distal aspect of the stent, and appeared to remain within the lumen of the stent, however, when the reach the aorta, the tip of the catheter was not exiting the tip of the stent  The catheter was removed over a Bentson wire, and a Vanessa catheter was advanced and inflated and gently pulled through the stents to identify a point at which the wire may have exited the stent, and none was identified  A spot image was obtained, and after careful examination it was evident that the proximal, approximately 1 cm of the stent was completely fractured from the remainder of the stent, rather than folded or crushed  The decision was made to place kissing covered stents in order to exclude the fractured fragment of stent from the circulation  On the left, access was obtained into the aorta, and was confirmed by arteriography to be in the true lumen rather than the dissection    Bentson wires were placed via each groin access, and the sheaths were exchanged for new 30 cm long 7-Armenian sheaths  "Kissing" bilateral 8 x 5 Viabahn stent grafts were deployed with extension up into the lower abdominal aorta  The stents were postdilated to 7 mm  On the right, the distal end of the existing stent was extended with a 7 x 8 Innova stent to treat the external iliac dissection  The stent terminates at the level of the inguinal ligament  Follow-up arteriography demonstrates the stents in good position with the fractured piece of stent excluded from the circulation, and significantly less contrast is noted entering the aortic dissection and bilateral common iliac dissections  Beyond the stent graft on the right, contrast is noted in the false lumen of the dissection  The bilateral internal iliac arteries remain patent, and there is rapid flow through the external iliac arteries into the common femoral arteries  At the end of the procedure, a Minx was deployed bilaterally for hemostasis  The patient had palpable distal pulses post procedure  Impression: Impression: Successful treatment of fractured right common and external iliac stent with coexisting distal abdominal aortic dissection, bilateral common iliac artery dissections, and right external iliac artery dissection with placement of " kissing " common iliac Viabahn stent grafts extending into the lower abdominal aorta with successful exclusion of the fractured stent from the circulation as described,  and significantly less contrast filling the aortic and common iliac dissections  The right external iliac stent was extended down to the inguinal ligament to treat the external iliac dissection  A good result was obtained, as described above  Workstation performed: [id number]     Vas Lower Limb Venous Duplex Study, Complete Bilateral    Result Date: 12/9/2018  Narrative:  [facility] REPORT CLINICAL: Operative History: Cardiac Stents Risk Factors The patient has history of Obesity, HTN, PAD and CAD    She has no history of Diabetes  CONCLUSION:  Impression: RIGHT LOWER LIMB: No evidence of acute or chronic deep vein thrombosis  No evidence of superficial thrombophlebitis noted  Doppler evaluation shows a normal response to augmentation maneuvers  Popliteal, posterior tibial and anterior tibial arterial Doppler waveforms are biphasic  LEFT LOWER LIMB: No evidence of acute or chronic deep vein thrombosis  No evidence of superficial thrombophlebitis noted  Doppler evaluation shows a normal response to augmentation maneuvers  Popliteal, posterior tibial and anterior tibial arterial Doppler waveforms are biphasic  SIGNATURE: Electronically Signed by: Fede Slater MD on 2018-12-09 06:14:47 PM      Review of Systems:  Review of Systems   Constitutional: Negative for diaphoresis and fatigue  HENT: Negative for congestion and facial swelling  Eyes: Negative for photophobia and visual disturbance  Respiratory: Positive for shortness of breath  Negative for chest tightness  Cardiovascular: Positive for leg swelling  Negative for chest pain and palpitations  Gastrointestinal: Negative for abdominal pain and nausea  Endocrine: Negative for cold intolerance and heat intolerance  Musculoskeletal: Negative for arthralgias and myalgias  Skin: Negative for pallor and rash  Neurological: Negative for dizziness and tremors  Psychiatric/Behavioral: Negative for sleep disturbance  The patient is not nervous/anxious  Physical Exam:  Physical Exam   Constitutional: She is oriented to person, place, and time  She appears well-developed and well-nourished  HENT:   Head: Normocephalic and atraumatic  Eyes: Pupils are equal, round, and reactive to light  Conjunctivae and EOM are normal    Neck: Normal range of motion  Neck supple  No JVD present  No thyromegaly present  Cardiovascular: Normal rate, regular rhythm, S1 normal, S2 normal, normal heart sounds and intact distal pulses    Exam reveals no gallop and no friction rub  No murmur heard  Pulses:       Carotid pulses are 2+ on the right side, and 2+ on the left side  1+ PEDAL EDEMA BILATERALLY, SLIGHTLY WORSE ON THE RIGHT LEG  Pulmonary/Chest: Effort normal and breath sounds normal  No respiratory distress  She has no wheezes  She has no rales  Abdominal: Soft  Bowel sounds are normal  She exhibits no distension  There is no tenderness  There is no rebound and no guarding  Musculoskeletal: Normal range of motion  She exhibits no edema  Neurological: She is alert and oriented to person, place, and time  She has normal reflexes  No cranial nerve deficit  Skin: Skin is warm and dry  Psychiatric: She has a normal mood and affect  Discussion/Summary:  1-Lower extremity edema, likely multifactorial, chronic venous diease   Pt advised to elevate legs whenever possible  Wear compressions stockings  Continue Coumadin      2-Peripheral arterial disease with recent lower extremity stenting  status post covered stent bilateral iliacs  Add right SFA angioplasty  Good perfusion  Continue Coumadin and Plavix  Continue statins     3-CAD, stable  Without anginal symptoms      4-atrial fibrillation/flutter  paroxysmal  Currently normal sinus rhythm  On Coumadin  Rate controlled    5  Shortness of breath and lower extremity edema could also be due to diastolic CHF  I will increase dose of Lasix to 40 mg once daily   Check BMP  Add proBNP     Discussed plan with daughter as well as patient     Follow-up with me in a month

## 2019-01-02 ENCOUNTER — TELEPHONE (OUTPATIENT)
Dept: CARDIOLOGY CLINIC | Facility: CLINIC | Age: 74
End: 2019-01-02

## 2019-01-02 DIAGNOSIS — R60.0 LOWER EXTREMITY EDEMA: Primary | ICD-10-CM

## 2019-01-02 NOTE — TELEPHONE ENCOUNTER
PT NEEDS A SCRIPT FOR COMPRESSION SOCKS SENT TO [facility] IN [place] AND ALSO FOR BW FOR KIDNEY FUNCTION  PT GOES TO [facility] FOR LAB WORK

## 2019-01-07 ENCOUNTER — OFFICE VISIT (OUTPATIENT)
Dept: VASCULAR SURGERY | Facility: CLINIC | Age: 74
End: 2019-01-07
Payer: MEDICARE

## 2019-01-07 VITALS
HEIGHT: 60 IN | WEIGHT: 183 LBS | BODY MASS INDEX: 35.93 KG/M2 | SYSTOLIC BLOOD PRESSURE: 180 MMHG | DIASTOLIC BLOOD PRESSURE: 80 MMHG | TEMPERATURE: 97.4 F | HEART RATE: 58 BPM

## 2019-01-07 DIAGNOSIS — I74.09 AORTOILIAC OCCLUSIVE DISEASE (HCC): Primary | ICD-10-CM

## 2019-01-07 DIAGNOSIS — R60.0 LOWER EXTREMITY EDEMA: ICD-10-CM

## 2019-01-07 DIAGNOSIS — I73.9 PERIPHERAL ARTERY DISEASE (HCC): ICD-10-CM

## 2019-01-07 PROCEDURE — 99215 OFFICE O/P EST HI 40 MIN: CPT | Performed by: SURGERY

## 2019-01-07 NOTE — PATIENT INSTRUCTIONS
1) Atherosclerosis  -your stents are open and your blood flow down the legs is good  -we will get an ultrasound of the pelvis in 3 months and continue monitoring this over time  -please continue your plavix (and your coumadin for heart arrhythmia)    2) Leg edema  -once you get your compression, please wear these daily and remove at night for sleep  -elevate your legs whenever possible  -stay active and keep walking  -try safety pinning your catheter bag to your clothes to avoid the tight elastic straps

## 2019-01-07 NOTE — PROGRESS NOTES
Assessment/Plan:    Pt is a 69 yo F w/ dementia, aflutter (on coumadin), COPD, JESENIA, CKD, szs, HTN, HLD, PAD s/p LLE endovascular intervention c/b PSA, underwent RLE agram/intervention with Dr Hart Runner 12/7/18 c/b misplaced R EIA stent deployment and aortoiliac dissection, now s/p angiogram (12/7/18, [place]) showing initial stent fracture, placement of B TEMO kissing Viabahn stents, R EIA distal stent extension for dissection    Aortoiliac occlusive disease ([address] )  Peripheral artery disease ([facility] )  -     VAS abdominal aorta/iliacs; complete study; Future  -reviewed angiogram with B TEMO viabahn stents to exclude misplaced stent and R EIA extension with open celled stent to treat dissection; remaining L EIA and aortic dissections did not appear flow limiting at end of procedure  -denies claudication, rest pain, wounds at this time; no access site complications  -reviewed initial postop ABIs which showed R: 0 77/89/78  L: 0 99/112/117 (prior 0 54/0 74) and mild to mod diffuse fem/pop disease  -will get AOIL in 3 mos to evaluate stents    Lower extremity edema  -this is not being caused by her arterial disease and was likely exacerbated by her intervention  -continue daily compression use (new pair in the mail); continue leg elevation and activity  -would avoid constrictive band from baca catheter around leg and have suggested using safety pins to clip it to her clothes instead    Medications  -cont plavix (for stents) and statin  -on coumadin for aflutter    Followup: 1 year or sooner if new symptoms    Subjective:      Patient ID: Debora Madrigal is a 68 y o  female  Patient is new to the practice and is s/p agram done on 12/7/18 and presents today to discuss results  Patient states that symptoms have resolved since the procedure  Denies any rest pain  Patient is currently residing at [facility] and her daughter states that she is back at baseline   Patient is taking Lipitor, Plavix and Coumadin daily      HPI:    Patient presents for followup after angiogram     Patient initially presented to me emergently after mis-deployment of R EIA stent which extended up and over the aortic bifurcation  She was treated with angiogram with B TEMO viabahn stents  She was also noted at that time to have B iliac and aortic dissections  R EIA was treated with open stent  Unclear what indication was for initial procedure  Daughter reports that symptom was leg edema  She has LLE angiogram in past c/b PSA  She is doing well today  She is ambulating well in her NH and denies claudication, rest pain, or wounds  No access site issues  She has indwelling catheter and is having some trouble with the elastic bands holding it in place cutting into her leg/edema  Patient is pleasantly demented with poor memory  Accompanied by daughter who provides history  The following portions of the patient's history were reviewed and updated as appropriate: allergies, current medications, past family history, past medical history, past social history, past surgical history and problem list     Review of Systems   Constitutional: Positive for appetite change and fatigue  HENT: Negative  Eyes: Negative  Respiratory: Positive for apnea (JESENIA) and shortness of breath  SOB with activity   Cardiovascular: Positive for leg swelling  Gastrointestinal: Negative  Endocrine: Negative  Genitourinary: Positive for difficulty urinating (catheter in place)  Musculoskeletal: Negative  Skin: Negative  Negative for wound  Allergic/Immunologic: Negative  Neurological: Negative  Negative for weakness and numbness  Hematological: Negative  Psychiatric/Behavioral: Positive for confusion           Objective:      BP (!) 180/80 (BP Location: Right arm, Patient Position: Sitting)   Pulse 58   Temp (!) 97 4 °F (36 3 °C) (Tympanic)   Ht 5' (1 524 m)   Wt 83 kg (183 lb)   BMI 35 74 kg/m²          Physical Exam   Constitutional: She is oriented to person, place, and time  She appears well-developed and well-nourished  HENT:   Head: Normocephalic and atraumatic  Eyes: Conjunctivae are normal    Neck: Normal range of motion  Neck supple  Cardiovascular: Normal rate, regular rhythm and normal heart sounds  No murmur heard  Pulses:       Radial pulses are 2+ on the right side, and 2+ on the left side  Dorsalis pedis pulses are 2+ on the right side, and 2+ on the left side  No carotid bruits B    B groin access sites C/D/I, no hematoma or ecchymosis   Pulmonary/Chest: Effort normal and breath sounds normal  No respiratory distress  She has no wheezes  She has no rales  Abdominal: Soft  She exhibits no distension  Genitourinary:   Genitourinary Comments: Davis catheter in place with leg bag; urine clear/yellow   Musculoskeletal: Normal range of motion  She exhibits edema (moderate BLE edema)  Neurological: She is alert and oriented to person, place, and time  Skin: Skin is warm and dry  Psychiatric: She has a normal mood and affect  Her behavior is normal    Poor memory, particularly short term   Nursing note and vitals reviewed          Vitals:    01/07/19 1513 01/07/19 1525   BP: (!) 172/78 (!) 180/80   BP Location: Left arm Right arm   Patient Position: Sitting Sitting   Pulse: 58    Temp: (!) 97 4 °F (36 3 °C)    TempSrc: Tympanic    Weight: 83 kg (183 lb)    Height: 5' (1 524 m)        Patient Active Problem List   Diagnosis    HTN (hypertension)    COPD (chronic obstructive pulmonary disease) (HCC)    Acute on chronic diastolic congestive heart failure (HCC)    Chronic respiratory failure (HCC)    Atrial flutter (HCC)    Back pain    Dementia    Hyperlipidemia    UTI (urinary tract infection)    Seizure disorder (HCC)    RSV infection    Ambulatory dysfunction    Hypernatremia    Stage 3 chronic kidney disease (HCC)    Lower extremity edema    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    JESENIA (obstructive sleep apnea)    Intermittent claudication of right lower extremity due to atherosclerosis (HCC)    Atrial fibrillation (HCC)    SOB (shortness of breath)    Chronic indwelling Davis catheter    Bradycardia    Chronic hypoxemic respiratory failure ([address] )    Essential hypertension    Peripheral artery disease (HCC)    Hematoma    Contrast dye induced nephropathy    Macrocytic anemia    Aortoiliac occlusive disease (HCC)       Past Surgical History:   Procedure Laterality Date    APPENDECTOMY      ARTERIOGRAM N/A 12/7/2018    Procedure: ARTERIOGRAM WITH STENT PLACEMENT;  Surgeon: Jewels Reddy MD;  [facility];  Service: Vascular    BLADDER TUMOR EXCISION     [id number]  clip right frontal lobe    CARDIAC SURGERY      COLONOSCOPY      CORONARY STENT PLACEMENT      5 stents    EYE SURGERY      IR ABDOMINAL ANGIOGRAPHY / INTERVENTION  12/7/2018    MANDIBLE FRACTURE SURGERY      TONSILLECTOMY      TUBAL LIGATION      UTERINE FIBROID SURGERY         Family History   Problem Relation Age of Onset    Heart disease Father     Parkinsonism Father     Macular degeneration Mother     Glaucoma Mother     Diabetes Brother     Heart disease Brother        Social History     Social History    Marital status:      Spouse name: N/A    Number of children: 3    Years of education: N/A     Occupational History    retired      Social History Main Topics    Smoking status: Former Smoker     Quit date: 9/7/2007    Smokeless tobacco: Never Used    Alcohol use No    Drug use: No    Sexual activity: Not on file     Other Topics Concern    Not on file     Social History Narrative    No narrative on file       Allergies   Allergen Reactions    Spiriva Handihaler [Tiotropium Bromide Monohydrate] Swelling    Ramipril Facial Swelling    Penicillins      unsure         Current Outpatient Prescriptions:     acetaminophen (TYLENOL) 500 mg tablet, Take 500 mg by mouth every 6 (six) hours as needed for mild pain, Disp: , Rfl:     albuterol (2 5 mg/3 mL) 0 083 % nebulizer solution, Take 1 vial (2 5 mg total) by nebulization every 6 (six) hours as needed for wheezing, Disp: 1 vial, Rfl: 0    albuterol (PROVENTIL HFA,VENTOLIN HFA) 90 mcg/act inhaler, Inhale 2 puffs every 6 (six) hours as needed for wheezing, Disp: 1 Inhaler, Rfl: 0    allopurinol (ZYLOPRIM) 300 mg tablet, Take 1 tablet (300 mg total) by mouth every morning for 14 days, Disp: 14 tablet, Rfl: 0    atorvastatin (LIPITOR) 10 mg tablet, Take 1 tablet (10 mg total) by mouth daily, Disp: 30 tablet, Rfl: 0    buPROPion (WELLBUTRIN SR) 150 mg 12 hr tablet, Take 1 tablet (150 mg total) by mouth daily, Disp: 30 tablet, Rfl: 0    carBAMazepine (TEGretol) 100 mg chewable tablet, Chew 1 tablet (100 mg total) 3 (three) times a day, Disp: 90 tablet, Rfl: 0    Cholecalciferol (VITAMIN D) 2000 units CAPS, Take 1 capsule (2,000 Units total) by mouth daily, Disp: 14 capsule, Rfl: 0    Citric Wu-Sdygrrucdml-La Carb (RENACIDIN) SOLN, Indications: 60ml to irrigate baca once a week, Disp: , Rfl:     clopidogrel (PLAVIX) 75 mg tablet, Take 1 tablet (75 mg total) by mouth daily, Disp: 90 tablet, Rfl: 0    diltiazem (CARDIZEM SR) 90 mg 12 hr capsule, Take 2 capsules (180 mg total) by mouth every 12 (twelve) hours, Disp: 60 capsule, Rfl: 0    divalproex sodium (DEPAKOTE ER) 250 mg 24 hr tablet, Take 1 tablet (250 mg total) by mouth daily, Disp: 30 tablet, Rfl: 0    fenofibrate micronized (LOFIBRA) 67 MG capsule, Take 1 capsule (67 mg total) by mouth daily with breakfast, Disp: 15 capsule, Rfl: 0    ferrous sulfate 325 (65 Fe) mg tablet, Take 1 tablet (325 mg total) by mouth 2 (two) times a day, Disp: 30 tablet, Rfl: 0    fluticasone-vilanterol (BREO ELLIPTA) 100-25 mcg/inh inhaler, Inhale 1 puff daily, Disp: 1 Inhaler, Rfl: 0    furosemide (LASIX) 40 mg tablet, Take 1 tablet (40 mg total) by mouth daily, Disp: 30 tablet, Rfl: 11    levothyroxine 88 mcg tablet, Take 1 tablet (88 mcg total) by mouth daily, Disp: 14 tablet, Rfl: 0    metoprolol tartrate (LOPRESSOR) 50 mg tablet, Take 1 tablet (50 mg total) by mouth every 12 (twelve) hours, Disp: 60 tablet, Rfl: 0    nitroglycerin (NITROSTAT) 0 4 mg SL tablet, Place 1 tablet (0 4 mg total) under the tongue every 5 (five) minutes as needed for chest pain, Disp: 90 tablet, Rfl: 0    OXYGEN-HELIUM IN, Inhale, Disp: , Rfl:     potassium chloride (K-DUR,KLOR-CON) 20 mEq tablet, Take 1 tablet (20 mEq total) by mouth daily, Disp: 15 tablet, Rfl: 0    Probiotic Product (ACIDOPHILUS/BIFIDUS PO), Take by mouth, Disp: , Rfl:     Sennosides-Docusate Sodium (SENNA PLUS PO), Take by mouth daily as needed, Disp: , Rfl:     warfarin (COUMADIN) 3 mg tablet, One tablet daily or as otherwise directed , Disp: 30 tablet, Rfl: 0

## 2019-01-10 ENCOUNTER — OFFICE VISIT (OUTPATIENT)
Dept: NEUROLOGY | Facility: CLINIC | Age: 74
End: 2019-01-10
Payer: MEDICARE

## 2019-01-10 VITALS
DIASTOLIC BLOOD PRESSURE: 82 MMHG | WEIGHT: 180.8 LBS | SYSTOLIC BLOOD PRESSURE: 140 MMHG | HEIGHT: 60 IN | HEART RATE: 50 BPM | BODY MASS INDEX: 35.5 KG/M2

## 2019-01-10 DIAGNOSIS — F02.81 DEMENTIA ASSOCIATED WITH OTHER UNDERLYING DISEASE WITH BEHAVIORAL DISTURBANCE (HCC): Primary | ICD-10-CM

## 2019-01-10 DIAGNOSIS — G40.909 SEIZURE DISORDER (HCC): ICD-10-CM

## 2019-01-10 PROCEDURE — 99213 OFFICE O/P EST LOW 20 MIN: CPT | Performed by: PSYCHIATRY & NEUROLOGY

## 2019-01-10 RX ORDER — BETAMETHASONE DIPROPIONATE 0.5 MG/G
CREAM TOPICAL 3 TIMES DAILY
COMMUNITY
End: 2021-06-25 | Stop reason: HOSPADM

## 2019-01-10 RX ORDER — CLOTRIMAZOLE 1 %
CREAM (GRAM) TOPICAL 3 TIMES DAILY
COMMUNITY
End: 2020-01-05 | Stop reason: ALTCHOICE

## 2019-01-10 NOTE — PROGRESS NOTES
Abundio Townsend is a 68 y o  female  Chief Complaint   Patient presents with    Follow-up     dementia        Assessment:  1  Dementia associated with other underlying disease with behavioral disturbance    2  Seizure disorder [facility])        Plan:    Discussion:    Discussed with patient's daughter and the patient regarding medications like Aricept Exelon and Namenda, they do not want to go on any medication and understand the risk of progression of the dementia, they are  not keen to go for repeat CT scan of the brain, she was advised mentally stimulating exercises, to take fall and safety precautions, to go to the hospital if has any worsening symptoms and call me otherwise to see me back in 6 months and follow up with her other physicians  Subjective:    HPI   Patient is here in follow-up for her dementia, she is accompanied with her daughter, since her last visit she had a surgery and hence daughter feels that patient was confused for a few days but is getting better, according to the daughter she feels mother is at baseline as she was a few months back, she still has issues with her short-term memory, she lives in a personal care home, she is denying having any headaches or vision or speech difficulty, [place] 16/30, no focal weakness, no other neurological complaints      Vitals:    01/10/19 1223   BP: 140/82   BP Location: Left arm   Patient Position: Sitting   Cuff Size: Adult   Pulse: (!) 50   Weight: 82 kg (180 lb 12 8 oz)   Height: 5' (1 524 m)       Current Medications    Current Outpatient Prescriptions:     acetaminophen (TYLENOL) 500 mg tablet, Take 500 mg by mouth every 6 (six) hours as needed for mild pain, Disp: , Rfl:     albuterol (2 5 mg/3 mL) 0 083 % nebulizer solution, Take 1 vial (2 5 mg total) by nebulization every 6 (six) hours as needed for wheezing, Disp: 1 vial, Rfl: 0    albuterol (PROVENTIL HFA,VENTOLIN HFA) 90 mcg/act inhaler, Inhale 2 puffs every 6 (six) hours as needed for wheezing, Disp: 1 Inhaler, Rfl: 0    atorvastatin (LIPITOR) 10 mg tablet, Take 1 tablet (10 mg total) by mouth daily, Disp: 30 tablet, Rfl: 0    betamethasone dipropionate (DIPROSONE) 0 05 % cream, Apply topically 3 (three) times a day, Disp: , Rfl:     buPROPion (WELLBUTRIN SR) 150 mg 12 hr tablet, Take 1 tablet (150 mg total) by mouth daily, Disp: 30 tablet, Rfl: 0    carBAMazepine (TEGretol) 100 mg chewable tablet, Chew 1 tablet (100 mg total) 3 (three) times a day, Disp: 90 tablet, Rfl: 0    Cholecalciferol (VITAMIN D) 2000 units CAPS, Take 1 capsule (2,000 Units total) by mouth daily, Disp: 14 capsule, Rfl: 0    Citric Gy-Vqspjcqmgza-Es Carb (RENACIDIN) SOLN, Indications: 60ml to irrigate baca once a week, Disp: , Rfl:     clopidogrel (PLAVIX) 75 mg tablet, Take 1 tablet (75 mg total) by mouth daily, Disp: 90 tablet, Rfl: 0    clotrimazole (LOTRIMIN) 1 % cream, Apply topically 3 (three) times a day, Disp: , Rfl:     diltiazem (CARDIZEM SR) 90 mg 12 hr capsule, Take 2 capsules (180 mg total) by mouth every 12 (twelve) hours, Disp: 60 capsule, Rfl: 0    divalproex sodium (DEPAKOTE ER) 250 mg 24 hr tablet, Take 1 tablet (250 mg total) by mouth daily (Patient taking differently: Take 250 mg by mouth 2 (two) times a day  ), Disp: 30 tablet, Rfl: 0    fenofibrate micronized (LOFIBRA) 67 MG capsule, Take 1 capsule (67 mg total) by mouth daily with breakfast, Disp: 15 capsule, Rfl: 0    ferrous sulfate 325 (65 Fe) mg tablet, Take 1 tablet (325 mg total) by mouth 2 (two) times a day, Disp: 30 tablet, Rfl: 0    fluticasone-vilanterol (BREO ELLIPTA) 100-25 mcg/inh inhaler, Inhale 1 puff daily, Disp: 1 Inhaler, Rfl: 0    furosemide (LASIX) 40 mg tablet, Take 1 tablet (40 mg total) by mouth daily, Disp: 30 tablet, Rfl: 11    levothyroxine 88 mcg tablet, Take 1 tablet (88 mcg total) by mouth daily (Patient taking differently: Take 100 mcg by mouth daily  ), Disp: 14 tablet, Rfl: 0    metoprolol tartrate (LOPRESSOR) 50 mg tablet, Take 1 tablet (50 mg total) by mouth every 12 (twelve) hours, Disp: 60 tablet, Rfl: 0    nitroglycerin (NITROSTAT) 0 4 mg SL tablet, Place 1 tablet (0 4 mg total) under the tongue every 5 (five) minutes as needed for chest pain, Disp: 90 tablet, Rfl: 0    OXYGEN-HELIUM IN, Inhale, Disp: , Rfl:     potassium chloride (K-DUR,KLOR-CON) 20 mEq tablet, Take 1 tablet (20 mEq total) by mouth daily, Disp: 15 tablet, Rfl: 0    Sennosides-Docusate Sodium (SENNA PLUS PO), Take by mouth daily as needed, Disp: , Rfl:     warfarin (COUMADIN) 3 mg tablet, One tablet daily or as otherwise directed , Disp: 30 tablet, Rfl: 0    allopurinol (ZYLOPRIM) 300 mg tablet, Take 1 tablet (300 mg total) by mouth every morning for 14 days, Disp: 14 tablet, Rfl: 0    Probiotic Product (ACIDOPHILUS/BIFIDUS PO), Take by mouth, Disp: , Rfl:       Allergies  Spiriva handihaler [tiotropium bromide monohydrate];  Ramipril; and Penicillins    Past Medical History  Past Medical History:   Diagnosis Date    [facility] )     Brain aneurysm     CAD (coronary artery disease)     Cardiac disease     had stents 12 years ago in [address] [place] [address] CH[facility] (congestive heart failure) ([facility])     CKD (chronic kidney disease)     COPD (chronic obstructive pulmonary disease) (HCC)     Dementia     Dementia     Disease of thyroid gland     GERD (gastroesophageal reflux disease)     Hyperlipidemia     Hyperlipidemia     Hypertension     MI ([facility]ocardial infarction) ([facility] )     Migraine     Renal disorder     Seizures ([facility] )     Stroke [facility])          Past Surgical History:  Past Surgical History:   Procedure Laterality Date    APPENDECTOMY      ARTERIOGRAM N/A 12/7/2018    Procedure: ARTERIOGRAM WITH STENT PLACEMENT;  Surgeon: Martin Reddy MD;  Location[facility];  Service: Vascular    BLADDER TUMOR EXCISION      BRAIN SURGERY      1998  clip right frontal lobe    CARDIAC SURGERY      COLONOSCOPY      CORONARY STENT PLACEMENT      5 stents    EYE SURGERY      IR ABDOMINAL ANGIOGRAPHY / INTERVENTION  12/7/2018    MANDIBLE FRACTURE SURGERY      TONSILLECTOMY      TUBAL LIGATION      UTERINE FIBROID SURGERY           Family History:  Family History   Problem Relation Age of Onset    Heart disease Father     Parkinsonism Father     Macular degeneration Mother     Glaucoma Mother     Diabetes Brother     Heart disease Brother        Social History:   reports that she quit smoking about 11 years ago  She has never used smokeless tobacco  She reports that she does not drink alcohol or use drugs  I have reviewed the past medical history, surgical history, social and family history, current medications, allergies vitals, review of systems, and updated this information as appropriate today  Objective:    Physical Exam    Neurological Exam    GENERAL:  Cooperative in no acute distress  Well-developed and well-nourished    HEAD and NECK   Head is atraumatic normocephalic with no lesions or masses  Neck is supple with full range of motion    CARDIOVASCULAR  Carotid Arteries-no carotid bruits  NEUROLOGIC:  Mental Status-the patient is awake alert and oriented without aphasia or apraxia, [place] is 16/30  Cranial Nerves: Visual fields are full to confrontation  Extraocular movements are full without nystagmus  Pupils are 2-1/2 mm and reactive  Face is symmetrical to light touch  Movements of facial expression move symmetrically  Hearing is normal to finger rub bilaterally  Soft palate lifts symmetrically  Shoulder shrug is symmetrical  Tongue is midline without atrophy  Motor: No drift is noted on arm extension  Strength is full in the upper and lower extremities with normal bulk and tone  Gait needs help with walking, she uses a walker at home to ambulate          ROS:  Review of Systems   Constitutional: Negative  HENT: Negative  Eyes: Negative  Respiratory: Positive for shortness of breath and wheezing  COPD    Cardiovascular: Positive for leg swelling (intermittent bilateral )  Atrial Fib and atrial flutter    Gastrointestinal: Positive for abdominal pain  Endocrine: Negative  Genitourinary: Davis catheter    Musculoskeletal: Negative  Skin: Negative  Allergic/Immunologic: Negative  Neurological: Positive for tremors and headaches  Hematological: Bruises/bleeds easily (bruises and bleeds easily )  Psychiatric/Behavioral: Positive for confusion, decreased concentration, hallucinations and sleep disturbance (Sleep apnea )  The patient is nervous/anxious

## 2019-01-12 ENCOUNTER — APPOINTMENT (EMERGENCY)
Dept: RADIOLOGY | Facility: HOSPITAL | Age: 74
DRG: 699 | End: 2019-01-12
Payer: MEDICARE

## 2019-01-12 ENCOUNTER — HOSPITAL ENCOUNTER (INPATIENT)
Facility: HOSPITAL | Age: 74
LOS: 3 days | Discharge: HOME HEALTH | DRG: 699 | End: 2019-01-16
Attending: EMERGENCY MEDICINE | Admitting: GENERAL PRACTICE
Payer: MEDICARE

## 2019-01-12 DIAGNOSIS — N39.0 URINARY TRACT INFECTION WITHOUT HEMATURIA, SITE UNSPECIFIED: ICD-10-CM

## 2019-01-12 DIAGNOSIS — N39.0 SYMPTOMATIC URINARY TRACT INFECTION: ICD-10-CM

## 2019-01-12 DIAGNOSIS — E86.0 DEHYDRATION: ICD-10-CM

## 2019-01-12 DIAGNOSIS — N39.0 URINARY TRACT INFECTION ASSOCIATED WITH CATHETERIZATION OF URINARY TRACT (HCC): ICD-10-CM

## 2019-01-12 DIAGNOSIS — Z97.8 CHRONIC INDWELLING FOLEY CATHETER: ICD-10-CM

## 2019-01-12 DIAGNOSIS — I48.92 ATRIAL FLUTTER, UNSPECIFIED TYPE (HCC): ICD-10-CM

## 2019-01-12 DIAGNOSIS — I69.398 MOOD DISORDER AS LATE EFFECT OF CEREBROVASCULAR ACCIDENT (CVA): ICD-10-CM

## 2019-01-12 DIAGNOSIS — N17.9 ACUTE KIDNEY INJURY (HCC): Primary | ICD-10-CM

## 2019-01-12 DIAGNOSIS — F06.30 MOOD DISORDER AS LATE EFFECT OF CEREBROVASCULAR ACCIDENT (CVA): ICD-10-CM

## 2019-01-12 DIAGNOSIS — G93.40 ENCEPHALOPATHY: ICD-10-CM

## 2019-01-12 DIAGNOSIS — T83.511A URINARY TRACT INFECTION ASSOCIATED WITH CATHETERIZATION OF URINARY TRACT (HCC): ICD-10-CM

## 2019-01-12 DIAGNOSIS — R26.2 AMBULATORY DYSFUNCTION: ICD-10-CM

## 2019-01-12 LAB
APTT PPP: 33 SECONDS (ref 26–38)
BASOPHILS # BLD AUTO: 0.03 THOUSANDS/ΜL (ref 0–0.1)
BASOPHILS NFR BLD AUTO: 1 % (ref 0–1)
EOSINOPHIL # BLD AUTO: 0.1 THOUSAND/ΜL (ref 0–0.61)
EOSINOPHIL NFR BLD AUTO: 2 % (ref 0–6)
ERYTHROCYTE [DISTWIDTH] IN BLOOD BY AUTOMATED COUNT: 13.8 % (ref 11.6–15.1)
HCT VFR BLD AUTO: 33.5 % (ref 34.8–46.1)
HGB BLD-MCNC: 10.7 G/DL (ref 11.5–15.4)
IMM GRANULOCYTES # BLD AUTO: 0.03 THOUSAND/UL (ref 0–0.2)
IMM GRANULOCYTES NFR BLD AUTO: 1 % (ref 0–2)
INR PPP: 1.61 (ref 0.86–1.17)
LYMPHOCYTES # BLD AUTO: 1.56 THOUSANDS/ΜL (ref 0.6–4.47)
LYMPHOCYTES NFR BLD AUTO: 25 % (ref 14–44)
MCH RBC QN AUTO: 33 PG (ref 26.8–34.3)
MCHC RBC AUTO-ENTMCNC: 31.9 G/DL (ref 31.4–37.4)
MCV RBC AUTO: 103 FL (ref 82–98)
MONOCYTES # BLD AUTO: 0.99 THOUSAND/ΜL (ref 0.17–1.22)
MONOCYTES NFR BLD AUTO: 16 % (ref 4–12)
NEUTROPHILS # BLD AUTO: 3.53 THOUSANDS/ΜL (ref 1.85–7.62)
NEUTS SEG NFR BLD AUTO: 55 % (ref 43–75)
NRBC BLD AUTO-RTO: 0 /100 WBCS
PLATELET # BLD AUTO: 157 THOUSANDS/UL (ref 149–390)
PMV BLD AUTO: 11.8 FL (ref 8.9–12.7)
PROTHROMBIN TIME: 18.9 SECONDS (ref 11.8–14.2)
RBC # BLD AUTO: 3.24 MILLION/UL (ref 3.81–5.12)
WBC # BLD AUTO: 6.24 THOUSAND/UL (ref 4.31–10.16)

## 2019-01-12 PROCEDURE — 36415 COLL VENOUS BLD VENIPUNCTURE: CPT | Performed by: EMERGENCY MEDICINE

## 2019-01-12 PROCEDURE — 87040 BLOOD CULTURE FOR BACTERIA: CPT | Performed by: EMERGENCY MEDICINE

## 2019-01-12 PROCEDURE — 84443 ASSAY THYROID STIM HORMONE: CPT | Performed by: EMERGENCY MEDICINE

## 2019-01-12 PROCEDURE — 96361 HYDRATE IV INFUSION ADD-ON: CPT

## 2019-01-12 PROCEDURE — 84484 ASSAY OF TROPONIN QUANT: CPT | Performed by: EMERGENCY MEDICINE

## 2019-01-12 PROCEDURE — 83735 ASSAY OF MAGNESIUM: CPT | Performed by: EMERGENCY MEDICINE

## 2019-01-12 PROCEDURE — 85730 THROMBOPLASTIN TIME PARTIAL: CPT | Performed by: EMERGENCY MEDICINE

## 2019-01-12 PROCEDURE — 87631 RESP VIRUS 3-5 TARGETS: CPT | Performed by: EMERGENCY MEDICINE

## 2019-01-12 PROCEDURE — 85025 COMPLETE CBC W/AUTO DIFF WBC: CPT | Performed by: EMERGENCY MEDICINE

## 2019-01-12 PROCEDURE — 85610 PROTHROMBIN TIME: CPT | Performed by: EMERGENCY MEDICINE

## 2019-01-12 PROCEDURE — 71046 X-RAY EXAM CHEST 2 VIEWS: CPT

## 2019-01-12 PROCEDURE — 99285 EMERGENCY DEPT VISIT HI MDM: CPT

## 2019-01-12 PROCEDURE — 93005 ELECTROCARDIOGRAM TRACING: CPT

## 2019-01-12 PROCEDURE — 84145 PROCALCITONIN (PCT): CPT | Performed by: EMERGENCY MEDICINE

## 2019-01-12 PROCEDURE — 80053 COMPREHEN METABOLIC PANEL: CPT | Performed by: EMERGENCY MEDICINE

## 2019-01-12 PROCEDURE — 83605 ASSAY OF LACTIC ACID: CPT | Performed by: EMERGENCY MEDICINE

## 2019-01-12 RX ADMIN — SODIUM CHLORIDE 500 ML: 0.9 INJECTION, SOLUTION INTRAVENOUS at 23:43

## 2019-01-13 PROBLEM — J96.10 CHRONIC RESPIRATORY FAILURE (HCC): Status: RESOLVED | Noted: 2018-03-22 | Resolved: 2019-01-13

## 2019-01-13 PROBLEM — R06.02 SOB (SHORTNESS OF BREATH): Status: RESOLVED | Noted: 2018-06-17 | Resolved: 2019-01-13

## 2019-01-13 PROBLEM — T83.511A URINARY TRACT INFECTION ASSOCIATED WITH CATHETERIZATION OF URINARY TRACT (HCC): Status: ACTIVE | Noted: 2018-03-24

## 2019-01-13 PROBLEM — T14.8XXA HEMATOMA: Status: RESOLVED | Noted: 2018-09-09 | Resolved: 2019-01-13

## 2019-01-13 PROBLEM — J96.11 CHRONIC HYPOXEMIC RESPIRATORY FAILURE (HCC): Status: RESOLVED | Noted: 2018-06-18 | Resolved: 2019-01-13

## 2019-01-13 PROBLEM — T50.8X5A CONTRAST DYE INDUCED NEPHROPATHY: Status: RESOLVED | Noted: 2018-10-12 | Resolved: 2019-01-13

## 2019-01-13 PROBLEM — N14.1 CONTRAST DYE INDUCED NEPHROPATHY: Status: RESOLVED | Noted: 2018-10-12 | Resolved: 2019-01-13

## 2019-01-13 PROBLEM — N14.11 CONTRAST DYE INDUCED NEPHROPATHY: Status: RESOLVED | Noted: 2018-10-12 | Resolved: 2019-01-13

## 2019-01-13 LAB
ALBUMIN SERPL BCP-MCNC: 3 G/DL (ref 3.5–5)
ALBUMIN SERPL BCP-MCNC: 3.2 G/DL (ref 3.5–5)
ALP SERPL-CCNC: 50 U/L (ref 46–116)
ALP SERPL-CCNC: 51 U/L (ref 46–116)
ALT SERPL W P-5'-P-CCNC: 24 U/L (ref 12–78)
ALT SERPL W P-5'-P-CCNC: 25 U/L (ref 12–78)
ANION GAP SERPL CALCULATED.3IONS-SCNC: 10 MMOL/L (ref 4–13)
ANION GAP SERPL CALCULATED.3IONS-SCNC: 9 MMOL/L (ref 4–13)
AST SERPL W P-5'-P-CCNC: 19 U/L (ref 5–45)
AST SERPL W P-5'-P-CCNC: 22 U/L (ref 5–45)
ATRIAL RATE: 50 BPM
BACTERIA UR QL AUTO: ABNORMAL /HPF
BASOPHILS # BLD AUTO: 0.02 THOUSANDS/ΜL (ref 0–0.1)
BASOPHILS NFR BLD AUTO: 0 % (ref 0–1)
BILIRUB SERPL-MCNC: 0.4 MG/DL (ref 0.2–1)
BILIRUB SERPL-MCNC: 0.5 MG/DL (ref 0.2–1)
BILIRUB UR QL STRIP: NEGATIVE
BUN SERPL-MCNC: 31 MG/DL (ref 5–25)
BUN SERPL-MCNC: 31 MG/DL (ref 5–25)
CALCIUM SERPL-MCNC: 9.1 MG/DL (ref 8.3–10.1)
CALCIUM SERPL-MCNC: 9.4 MG/DL (ref 8.3–10.1)
CAOX CRY URNS QL MICRO: ABNORMAL /HPF
CARBAMAZEPINE SERPL-MCNC: 9 UG/ML (ref 4–12)
CHLORIDE SERPL-SCNC: 106 MMOL/L (ref 100–108)
CHLORIDE SERPL-SCNC: 109 MMOL/L (ref 100–108)
CLARITY UR: CLEAR
CO2 SERPL-SCNC: 28 MMOL/L (ref 21–32)
CO2 SERPL-SCNC: 30 MMOL/L (ref 21–32)
COLOR UR: YELLOW
CREAT SERPL-MCNC: 1.42 MG/DL (ref 0.6–1.3)
CREAT SERPL-MCNC: 1.68 MG/DL (ref 0.6–1.3)
EOSINOPHIL # BLD AUTO: 0.08 THOUSAND/ΜL (ref 0–0.61)
EOSINOPHIL NFR BLD AUTO: 2 % (ref 0–6)
ERYTHROCYTE [DISTWIDTH] IN BLOOD BY AUTOMATED COUNT: 13.6 % (ref 11.6–15.1)
FLUAV AG SPEC QL: NORMAL
FLUBV AG SPEC QL: NORMAL
GFR SERPL CREATININE-BSD FRML MDRD: 30 ML/MIN/1.73SQ M
GFR SERPL CREATININE-BSD FRML MDRD: 37 ML/MIN/1.73SQ M
GLUCOSE SERPL-MCNC: 118 MG/DL (ref 65–140)
GLUCOSE SERPL-MCNC: 96 MG/DL (ref 65–140)
GLUCOSE UR STRIP-MCNC: NEGATIVE MG/DL
HCT VFR BLD AUTO: 32.8 % (ref 34.8–46.1)
HGB BLD-MCNC: 10.5 G/DL (ref 11.5–15.4)
HGB UR QL STRIP.AUTO: ABNORMAL
IMM GRANULOCYTES # BLD AUTO: 0.02 THOUSAND/UL (ref 0–0.2)
IMM GRANULOCYTES NFR BLD AUTO: 0 % (ref 0–2)
INR PPP: 1.66 (ref 0.86–1.17)
KETONES UR STRIP-MCNC: NEGATIVE MG/DL
LACTATE SERPL-SCNC: 0.7 MMOL/L (ref 0.5–2)
LEUKOCYTE ESTERASE UR QL STRIP: ABNORMAL
LYMPHOCYTES # BLD AUTO: 1.34 THOUSANDS/ΜL (ref 0.6–4.47)
LYMPHOCYTES NFR BLD AUTO: 28 % (ref 14–44)
MAGNESIUM SERPL-MCNC: 2 MG/DL (ref 1.6–2.6)
MAGNESIUM SERPL-MCNC: 2.2 MG/DL (ref 1.6–2.6)
MCH RBC QN AUTO: 33.2 PG (ref 26.8–34.3)
MCHC RBC AUTO-ENTMCNC: 32 G/DL (ref 31.4–37.4)
MCV RBC AUTO: 104 FL (ref 82–98)
MONOCYTES # BLD AUTO: 0.78 THOUSAND/ΜL (ref 0.17–1.22)
MONOCYTES NFR BLD AUTO: 16 % (ref 4–12)
NEUTROPHILS # BLD AUTO: 2.52 THOUSANDS/ΜL (ref 1.85–7.62)
NEUTS SEG NFR BLD AUTO: 54 % (ref 43–75)
NITRITE UR QL STRIP: POSITIVE
NON-SQ EPI CELLS URNS QL MICRO: ABNORMAL /HPF
NRBC BLD AUTO-RTO: 0 /100 WBCS
P AXIS: 48 DEGREES
PH UR STRIP.AUTO: 8 [PH] (ref 4.5–8)
PHOSPHATE SERPL-MCNC: 3.8 MG/DL (ref 2.3–4.1)
PLATELET # BLD AUTO: 130 THOUSANDS/UL (ref 149–390)
PMV BLD AUTO: 11 FL (ref 8.9–12.7)
POTASSIUM SERPL-SCNC: 3.6 MMOL/L (ref 3.5–5.3)
POTASSIUM SERPL-SCNC: 3.7 MMOL/L (ref 3.5–5.3)
PR INTERVAL: 184 MS
PROCALCITONIN SERPL-MCNC: 0.17 NG/ML
PROCALCITONIN SERPL-MCNC: 0.23 NG/ML
PROT SERPL-MCNC: 6.8 G/DL (ref 6.4–8.2)
PROT SERPL-MCNC: 6.9 G/DL (ref 6.4–8.2)
PROT UR STRIP-MCNC: ABNORMAL MG/DL
PROTHROMBIN TIME: 19.4 SECONDS (ref 11.8–14.2)
QRS AXIS: -1 DEGREES
QRSD INTERVAL: 102 MS
QT INTERVAL: 476 MS
QTC INTERVAL: 433 MS
RBC # BLD AUTO: 3.16 MILLION/UL (ref 3.81–5.12)
RBC #/AREA URNS AUTO: ABNORMAL /HPF
RSV B RNA SPEC QL NAA+PROBE: NORMAL
SODIUM SERPL-SCNC: 146 MMOL/L (ref 136–145)
SODIUM SERPL-SCNC: 146 MMOL/L (ref 136–145)
SP GR UR STRIP.AUTO: 1.01 (ref 1–1.03)
T WAVE AXIS: 54 DEGREES
TROPONIN I SERPL-MCNC: 0.02 NG/ML
TSH SERPL DL<=0.05 MIU/L-ACNC: 0.88 UIU/ML (ref 0.36–3.74)
UROBILINOGEN UR QL STRIP.AUTO: 0.2 E.U./DL
VALPROATE SERPL-MCNC: 32 UG/ML (ref 50–100)
VENTRICULAR RATE: 50 BPM
WBC # BLD AUTO: 4.76 THOUSAND/UL (ref 4.31–10.16)
WBC #/AREA URNS AUTO: ABNORMAL /HPF
WBC CLUMPS # UR AUTO: ABNORMAL /UL

## 2019-01-13 PROCEDURE — 84100 ASSAY OF PHOSPHORUS: CPT | Performed by: INTERNAL MEDICINE

## 2019-01-13 PROCEDURE — 84145 PROCALCITONIN (PCT): CPT | Performed by: INTERNAL MEDICINE

## 2019-01-13 PROCEDURE — 87086 URINE CULTURE/COLONY COUNT: CPT | Performed by: EMERGENCY MEDICINE

## 2019-01-13 PROCEDURE — 97163 PT EVAL HIGH COMPLEX 45 MIN: CPT

## 2019-01-13 PROCEDURE — 87186 SC STD MICRODIL/AGAR DIL: CPT | Performed by: EMERGENCY MEDICINE

## 2019-01-13 PROCEDURE — 80156 ASSAY CARBAMAZEPINE TOTAL: CPT | Performed by: INTERNAL MEDICINE

## 2019-01-13 PROCEDURE — 81001 URINALYSIS AUTO W/SCOPE: CPT | Performed by: EMERGENCY MEDICINE

## 2019-01-13 PROCEDURE — 94760 N-INVAS EAR/PLS OXIMETRY 1: CPT

## 2019-01-13 PROCEDURE — 96361 HYDRATE IV INFUSION ADD-ON: CPT

## 2019-01-13 PROCEDURE — 87077 CULTURE AEROBIC IDENTIFY: CPT | Performed by: EMERGENCY MEDICINE

## 2019-01-13 PROCEDURE — 85025 COMPLETE CBC W/AUTO DIFF WBC: CPT | Performed by: INTERNAL MEDICINE

## 2019-01-13 PROCEDURE — G8978 MOBILITY CURRENT STATUS: HCPCS

## 2019-01-13 PROCEDURE — 83735 ASSAY OF MAGNESIUM: CPT | Performed by: INTERNAL MEDICINE

## 2019-01-13 PROCEDURE — 80164 ASSAY DIPROPYLACETIC ACD TOT: CPT | Performed by: INTERNAL MEDICINE

## 2019-01-13 PROCEDURE — 99219 PR INITIAL OBSERVATION CARE/DAY 50 MINUTES: CPT | Performed by: INTERNAL MEDICINE

## 2019-01-13 PROCEDURE — 85610 PROTHROMBIN TIME: CPT | Performed by: INTERNAL MEDICINE

## 2019-01-13 PROCEDURE — 80053 COMPREHEN METABOLIC PANEL: CPT | Performed by: INTERNAL MEDICINE

## 2019-01-13 PROCEDURE — 96374 THER/PROPH/DIAG INJ IV PUSH: CPT

## 2019-01-13 PROCEDURE — G8979 MOBILITY GOAL STATUS: HCPCS

## 2019-01-13 PROCEDURE — 93010 ELECTROCARDIOGRAM REPORT: CPT | Performed by: INTERNAL MEDICINE

## 2019-01-13 PROCEDURE — 94664 DEMO&/EVAL PT USE INHALER: CPT

## 2019-01-13 RX ORDER — SACCHAROMYCES BOULARDII 250 MG
250 CAPSULE ORAL 2 TIMES DAILY
Status: DISCONTINUED | OUTPATIENT
Start: 2019-01-13 | End: 2019-01-16 | Stop reason: HOSPADM

## 2019-01-13 RX ORDER — ONDANSETRON 2 MG/ML
4 INJECTION INTRAMUSCULAR; INTRAVENOUS EVERY 6 HOURS PRN
Status: DISCONTINUED | OUTPATIENT
Start: 2019-01-13 | End: 2019-01-16 | Stop reason: HOSPADM

## 2019-01-13 RX ORDER — SENNOSIDES 8.6 MG
1 TABLET ORAL DAILY
Status: DISCONTINUED | OUTPATIENT
Start: 2019-01-13 | End: 2019-01-16 | Stop reason: HOSPADM

## 2019-01-13 RX ORDER — ACETAMINOPHEN 325 MG/1
650 TABLET ORAL EVERY 6 HOURS PRN
Status: DISCONTINUED | OUTPATIENT
Start: 2019-01-13 | End: 2019-01-16 | Stop reason: HOSPADM

## 2019-01-13 RX ORDER — DIVALPROEX SODIUM 250 MG/1
500 TABLET, EXTENDED RELEASE ORAL DAILY
Status: DISCONTINUED | OUTPATIENT
Start: 2019-01-14 | End: 2019-01-16 | Stop reason: HOSPADM

## 2019-01-13 RX ORDER — DOCUSATE SODIUM 100 MG/1
100 CAPSULE, LIQUID FILLED ORAL 2 TIMES DAILY
Status: DISCONTINUED | OUTPATIENT
Start: 2019-01-13 | End: 2019-01-16 | Stop reason: HOSPADM

## 2019-01-13 RX ORDER — HYDRALAZINE HYDROCHLORIDE 20 MG/ML
10 INJECTION INTRAMUSCULAR; INTRAVENOUS EVERY 6 HOURS PRN
Status: DISCONTINUED | OUTPATIENT
Start: 2019-01-13 | End: 2019-01-16 | Stop reason: HOSPADM

## 2019-01-13 RX ORDER — METOPROLOL TARTRATE 50 MG/1
50 TABLET, FILM COATED ORAL EVERY 12 HOURS SCHEDULED
Status: DISCONTINUED | OUTPATIENT
Start: 2019-01-13 | End: 2019-01-16 | Stop reason: HOSPADM

## 2019-01-13 RX ORDER — ALBUTEROL SULFATE 2.5 MG/3ML
2.5 SOLUTION RESPIRATORY (INHALATION) EVERY 4 HOURS PRN
Status: DISCONTINUED | OUTPATIENT
Start: 2019-01-13 | End: 2019-01-16 | Stop reason: HOSPADM

## 2019-01-13 RX ORDER — LEVOTHYROXINE SODIUM 88 UG/1
88 TABLET ORAL
Status: DISCONTINUED | OUTPATIENT
Start: 2019-01-13 | End: 2019-01-16 | Stop reason: HOSPADM

## 2019-01-13 RX ORDER — FENOFIBRATE 67 MG/1
67 CAPSULE ORAL
Status: DISCONTINUED | OUTPATIENT
Start: 2019-01-13 | End: 2019-01-14

## 2019-01-13 RX ORDER — FERROUS SULFATE 325(65) MG
325 TABLET ORAL 2 TIMES DAILY
Status: DISCONTINUED | OUTPATIENT
Start: 2019-01-13 | End: 2019-01-16 | Stop reason: HOSPADM

## 2019-01-13 RX ORDER — ALLOPURINOL 300 MG/1
300 TABLET ORAL EVERY MORNING
Status: DISCONTINUED | OUTPATIENT
Start: 2019-01-13 | End: 2019-01-16 | Stop reason: HOSPADM

## 2019-01-13 RX ORDER — FLUTICASONE FUROATE AND VILANTEROL 100; 25 UG/1; UG/1
1 POWDER RESPIRATORY (INHALATION) DAILY
Status: DISCONTINUED | OUTPATIENT
Start: 2019-01-13 | End: 2019-01-16 | Stop reason: HOSPADM

## 2019-01-13 RX ORDER — FUROSEMIDE 40 MG/1
40 TABLET ORAL DAILY
Status: DISCONTINUED | OUTPATIENT
Start: 2019-01-13 | End: 2019-01-16 | Stop reason: HOSPADM

## 2019-01-13 RX ORDER — DIVALPROEX SODIUM 250 MG/1
250 TABLET, EXTENDED RELEASE ORAL DAILY
Status: DISCONTINUED | OUTPATIENT
Start: 2019-01-13 | End: 2019-01-13

## 2019-01-13 RX ORDER — POTASSIUM CHLORIDE 20 MEQ/1
20 TABLET, EXTENDED RELEASE ORAL DAILY
Status: DISCONTINUED | OUTPATIENT
Start: 2019-01-13 | End: 2019-01-16 | Stop reason: HOSPADM

## 2019-01-13 RX ORDER — DILTIAZEM HYDROCHLORIDE 90 MG/1
180 CAPSULE, EXTENDED RELEASE ORAL EVERY 12 HOURS SCHEDULED
Status: DISCONTINUED | OUTPATIENT
Start: 2019-01-13 | End: 2019-01-16 | Stop reason: HOSPADM

## 2019-01-13 RX ORDER — CARBAMAZEPINE 100 MG/1
100 TABLET, CHEWABLE ORAL 3 TIMES DAILY
Status: DISCONTINUED | OUTPATIENT
Start: 2019-01-13 | End: 2019-01-16 | Stop reason: HOSPADM

## 2019-01-13 RX ORDER — BUPROPION HYDROCHLORIDE 150 MG/1
150 TABLET, EXTENDED RELEASE ORAL DAILY
Status: DISCONTINUED | OUTPATIENT
Start: 2019-01-13 | End: 2019-01-16 | Stop reason: HOSPADM

## 2019-01-13 RX ORDER — WARFARIN SODIUM 2.5 MG/1
5 TABLET ORAL
Status: DISCONTINUED | OUTPATIENT
Start: 2019-01-13 | End: 2019-01-15

## 2019-01-13 RX ORDER — NITROGLYCERIN 0.4 MG/1
0.4 TABLET SUBLINGUAL
Status: DISCONTINUED | OUTPATIENT
Start: 2019-01-13 | End: 2019-01-16 | Stop reason: HOSPADM

## 2019-01-13 RX ORDER — IPRATROPIUM BROMIDE AND ALBUTEROL SULFATE 2.5; .5 MG/3ML; MG/3ML
3 SOLUTION RESPIRATORY (INHALATION)
Status: DISCONTINUED | OUTPATIENT
Start: 2019-01-13 | End: 2019-01-13

## 2019-01-13 RX ORDER — ALBUTEROL SULFATE 2.5 MG/3ML
2.5 SOLUTION RESPIRATORY (INHALATION) EVERY 6 HOURS PRN
Status: DISCONTINUED | OUTPATIENT
Start: 2019-01-13 | End: 2019-01-13

## 2019-01-13 RX ORDER — ATORVASTATIN CALCIUM 10 MG/1
10 TABLET, FILM COATED ORAL DAILY
Status: DISCONTINUED | OUTPATIENT
Start: 2019-01-13 | End: 2019-01-16 | Stop reason: HOSPADM

## 2019-01-13 RX ORDER — CLOPIDOGREL BISULFATE 75 MG/1
75 TABLET ORAL DAILY
Status: DISCONTINUED | OUTPATIENT
Start: 2019-01-13 | End: 2019-01-16 | Stop reason: HOSPADM

## 2019-01-13 RX ORDER — MELATONIN
2000 DAILY
Status: DISCONTINUED | OUTPATIENT
Start: 2019-01-13 | End: 2019-01-16 | Stop reason: HOSPADM

## 2019-01-13 RX ADMIN — DOCUSATE SODIUM 100 MG: 100 CAPSULE, LIQUID FILLED ORAL at 19:34

## 2019-01-13 RX ADMIN — LEVOTHYROXINE SODIUM 88 MCG: 88 TABLET ORAL at 07:18

## 2019-01-13 RX ADMIN — FUROSEMIDE 40 MG: 40 TABLET ORAL at 09:27

## 2019-01-13 RX ADMIN — DIVALPROEX SODIUM 250 MG: 250 TABLET, FILM COATED, EXTENDED RELEASE ORAL at 09:27

## 2019-01-13 RX ADMIN — CEFTRIAXONE 1000 MG: 1 INJECTION, POWDER, FOR SOLUTION INTRAMUSCULAR; INTRAVENOUS at 02:34

## 2019-01-13 RX ADMIN — CEFTAZIDIME 500 MG: 1 INJECTION, POWDER, FOR SOLUTION INTRAMUSCULAR; INTRAVENOUS at 23:13

## 2019-01-13 RX ADMIN — SENNOSIDES 8.6 MG: 8.6 TABLET, FILM COATED ORAL at 09:26

## 2019-01-13 RX ADMIN — FERROUS SULFATE TAB 325 MG (65 MG ELEMENTAL FE) 325 MG: 325 (65 FE) TAB at 09:27

## 2019-01-13 RX ADMIN — CARBAMAZEPINE 100 MG: 100 TABLET, CHEWABLE ORAL at 19:34

## 2019-01-13 RX ADMIN — CLOPIDOGREL BISULFATE 75 MG: 75 TABLET ORAL at 09:26

## 2019-01-13 RX ADMIN — POTASSIUM CHLORIDE 20 MEQ: 1500 TABLET, EXTENDED RELEASE ORAL at 09:26

## 2019-01-13 RX ADMIN — CEFTAZIDIME 500 MG: 1 INJECTION, POWDER, FOR SOLUTION INTRAMUSCULAR; INTRAVENOUS at 13:59

## 2019-01-13 RX ADMIN — VITAMIN D, TAB 1000IU (100/BT) 2000 UNITS: 25 TAB at 09:26

## 2019-01-13 RX ADMIN — DILTIAZEM HYDROCHLORIDE 180 MG: 90 CAPSULE, EXTENDED RELEASE ORAL at 09:27

## 2019-01-13 RX ADMIN — ATORVASTATIN CALCIUM 10 MG: 10 TABLET, FILM COATED ORAL at 09:26

## 2019-01-13 RX ADMIN — DILTIAZEM HYDROCHLORIDE 180 MG: 90 CAPSULE, EXTENDED RELEASE ORAL at 22:24

## 2019-01-13 RX ADMIN — ALLOPURINOL 300 MG: 300 TABLET ORAL at 09:29

## 2019-01-13 RX ADMIN — METOPROLOL TARTRATE 50 MG: 50 TABLET, FILM COATED ORAL at 09:26

## 2019-01-13 RX ADMIN — METOPROLOL TARTRATE 50 MG: 50 TABLET, FILM COATED ORAL at 05:39

## 2019-01-13 RX ADMIN — DOCUSATE SODIUM 100 MG: 100 CAPSULE, LIQUID FILLED ORAL at 09:26

## 2019-01-13 RX ADMIN — WARFARIN SODIUM 5 MG: 2.5 TABLET ORAL at 19:37

## 2019-01-13 RX ADMIN — METOPROLOL TARTRATE 50 MG: 50 TABLET, FILM COATED ORAL at 22:23

## 2019-01-13 RX ADMIN — Medication 250 MG: at 19:34

## 2019-01-13 RX ADMIN — FLUTICASONE FUROATE AND VILANTEROL TRIFENATATE 1 PUFF: 100; 25 POWDER RESPIRATORY (INHALATION) at 09:27

## 2019-01-13 RX ADMIN — ACETAMINOPHEN 650 MG: 325 TABLET, FILM COATED ORAL at 16:17

## 2019-01-13 RX ADMIN — FERROUS SULFATE TAB 325 MG (65 MG ELEMENTAL FE) 325 MG: 325 (65 FE) TAB at 19:34

## 2019-01-13 RX ADMIN — Medication 250 MG: at 09:26

## 2019-01-13 RX ADMIN — BUPROPION HYDROCHLORIDE 150 MG: 150 TABLET, FILM COATED, EXTENDED RELEASE ORAL at 09:27

## 2019-01-13 RX ADMIN — CARBAMAZEPINE 100 MG: 100 TABLET, CHEWABLE ORAL at 22:23

## 2019-01-13 RX ADMIN — CARBAMAZEPINE 100 MG: 100 TABLET, CHEWABLE ORAL at 09:26

## 2019-01-13 NOTE — H&P
H&P- Chris Euceda [birth year], 68 y o  female MRN: [id number]  Unit/Bed#: ED 18 Encounter: [id number]  Primary Care Provider: Noelle Snellen, DO   Date and time admitted to hospital: 1/12/2019 10:31 PM        * Urinary tract infection associated with catheterization of urinary tract [facility])   Assessment & Plan    Follow-up results of blood culture x2, urine culture  Catheter associated UTI  Continue IV Rocephin for now, titrate antibiotics accordingly to results of cultures  Atrial fibrillation (HCC)   Assessment & Plan    Rate control  Continue with Cardizem, Lopressor, Coumadin  Bradycardia   Assessment & Plan    Maintain telemetry  Check TSH  Dementia   Assessment & Plan    At baseline no active issues  Maintain fall aspiration precautions  PTOT evaluation  Aortoiliac occlusive disease [facility])   Assessment & Plan       12/2018 S/p angiogram showing initial stent fracture, placement of B TEMO stents, R EIA distal stent extension for dissection  Continue with dual anti-platelet therapy, Coumadin  Seizure disorder [facility])   Assessment & Plan    Currently stable no active issues  Patient will continue with carbamazepine, Depakote  Maintain seizure precautions  VTE PROPHYLAXIS:  Coumadin + SCDs    CODE STATUS: FULL    Anticipated Length of Stay:  Patient will be admitted on an Observation basis with an anticipated length of stay of less than 2 midnights  Justification for Hospital Stay:  UTI      CHIEF COMPLAINT    · Low-grade fever and generalized weakness x1 day    HISTORY OF PRESENT ILLNESS  Chris Euceda is a pleasant 55-year-old lady with complex past medical history as below, came to the hospital for complaints of low-grade fever and generalized weakness for 1 day  Patient states that since yesterday morning she has been feeling weak with poor appetite  Subjective feeling of chills    Patient has chronic indwelling Davis's catheter for neurogenic bladder that was recently changed on Friday  Patient is accompanied by her daughter at bedside  Around 9:30 p m, patient insisted she be taken to the hospital as she felt more weak  As per the daughter and patient, her condition has deteriorated since her last hospitalization in December 2018  Currently found to have urinary tract infection  Patient denies chest pain, shortness of breath, PND, orthopnea, palpitations, diaphoresis, nausea, vomiting, diarrhea, constipation, blood in urine, stool, sputum, abdominal pain,dysuria, headache, new onset weakness, slurred speech, seizure-like activity,dizziness, blurred vision, loss of consciousness, fall, trauma to the head, recent change in medications, dietary noncompliance, skin rashes, recent travel or recent sick contacts  Patient's daughter Joey Hammond at bedside and all questions have been answered to her satisfaction for now      REVIEW OF SYSTEMS  · A comprehensive 10 point review system conducted all negative except as mentioned in HPI       PMH/PSH    Past Medical History:   Diagnosis Date    Atrial fibrillation ([facility] )     Brain aneurysm     CAD (coronary artery disease)     Cardiac disease     had stents 12 years ago in [address] [place] [address] CHF [facility]congestive heart failure) ([facility])     CKD (chronic kidney disease)     COPD (chronic obstructive pulmonary disease) ([facility])     Dementia     Dementia     Disease of thyroid gland     GERD (gastroesophageal reflux disease)     Hyperlipidemia     Hyperlipidemia     Hypertension     [facility]     Migraine     Renal disorder     Seizures ([facility] )     Stroke [facility])        Past Surgical History:   Procedure Laterality Date    APPENDECTOMY      ARTERIOGRAM N/A 12/7/2018    Procedure: ARTERIOGRAM WITH STENT PLACEMENT;  Surgeon: Abida Reddy MD;  [facility]: [facility];  Service: Vascular    BLADDER TUMOR EXCISION      BRAIN SURGERY      1998  clip right frontal lobe    CARDIAC SURGERY      COLONOSCOPY      CORONARY STENT PLACEMENT      5 stents    EYE SURGERY      IR ABDOMINAL ANGIOGRAPHY / INTERVENTION  12/7/2018    MANDIBLE FRACTURE SURGERY      TONSILLECTOMY      TUBAL LIGATION      UTERINE FIBROID SURGERY         ALLERGIES  Allergies   Allergen Reactions    Spiriva Handihaler [Tiotropium Bromide Monohydrate] Swelling    Ramipril Facial Swelling    Penicillins      unsure       HOME MEDICATIONS  No current facility-administered medications on file prior to encounter        Current Outpatient Prescriptions on File Prior to Encounter   Medication Sig    acetaminophen (TYLENOL) 500 mg tablet Take 500 mg by mouth every 6 (six) hours as needed for mild pain    albuterol (2 5 mg/3 mL) 0 083 % nebulizer solution Take 1 vial (2 5 mg total) by nebulization every 6 (six) hours as needed for wheezing    albuterol (PROVENTIL HFA,VENTOLIN HFA) 90 mcg/act inhaler Inhale 2 puffs every 6 (six) hours as needed for wheezing    allopurinol (ZYLOPRIM) 300 mg tablet Take 1 tablet (300 mg total) by mouth every morning for 14 days    atorvastatin (LIPITOR) 10 mg tablet Take 1 tablet (10 mg total) by mouth daily    betamethasone dipropionate (DIPROSONE) 0 05 % cream Apply topically 3 (three) times a day    buPROPion (WELLBUTRIN SR) 150 mg 12 hr tablet Take 1 tablet (150 mg total) by mouth daily    carBAMazepine (TEGretol) 100 mg chewable tablet Chew 1 tablet (100 mg total) 3 (three) times a day    Cholecalciferol (VITAMIN D) 2000 units CAPS Take 1 capsule (2,000 Units total) by mouth daily    Citric Pj-Njryfqtctmy-St Carb (RENACIDIN) SOLN Indications: 60ml to irrigate baca once a week    clopidogrel (PLAVIX) 75 mg tablet Take 1 tablet (75 mg total) by mouth daily    clotrimazole (LOTRIMIN) 1 % cream Apply topically 3 (three) times a day    diltiazem (CARDIZEM SR) 90 mg 12 hr capsule Take 2 capsules (180 mg total) by mouth every 12 (twelve) hours    divalproex sodium (DEPAKOTE ER) 250 mg 24 hr tablet Take 1 tablet (250 mg total) by mouth daily (Patient taking differently: Take 250 mg by mouth 2 (two) times a day  )    fenofibrate micronized (LOFIBRA) 67 MG capsule Take 1 capsule (67 mg total) by mouth daily with breakfast    ferrous sulfate 325 (65 Fe) mg tablet Take 1 tablet (325 mg total) by mouth 2 (two) times a day    fluticasone-vilanterol (BREO ELLIPTA) 100-25 mcg/inh inhaler Inhale 1 puff daily    furosemide (LASIX) 40 mg tablet Take 1 tablet (40 mg total) by mouth daily    levothyroxine 88 mcg tablet Take 1 tablet (88 mcg total) by mouth daily (Patient taking differently: Take 100 mcg by mouth daily  )    metoprolol tartrate (LOPRESSOR) 50 mg tablet Take 1 tablet (50 mg total) by mouth every 12 (twelve) hours    nitroglycerin (NITROSTAT) 0 4 mg SL tablet Place 1 tablet (0 4 mg total) under the tongue every 5 (five) minutes as needed for chest pain    OXYGEN-HELIUM IN Inhale    potassium chloride (K-DUR,KLOR-CON) 20 mEq tablet Take 1 tablet (20 mEq total) by mouth daily    Probiotic Product (ACIDOPHILUS/BIFIDUS PO) Take by mouth    Sennosides-Docusate Sodium (SENNA PLUS PO) Take by mouth daily as needed    warfarin (COUMADIN) 3 mg tablet One tablet daily or as otherwise directed  SOCIAL HISTORY     Marital Status:    Substance Use History:   History   Alcohol Use No     History   Smoking Status    Former Smoker    Quit date: 9/7/2007   Smokeless Tobacco    Never Used     History   Drug Use No       FAMILY HISTORY  · Reviewed noncontributory  OBJECTIVE    Vitals:   Blood Pressure: 125/78 (01/12/19 2240)  Pulse: (!) 53 (01/12/19 2240)  Temperature: 98 7 °F (37 1 °C) (01/12/19 2240)  Temp Source: Oral (01/12/19 2240)  Respirations: 16 (01/12/19 2240)  SpO2: 95 % (01/12/19 2240)    GENERAL: AAO x 3  HEENT: atraumatic, normocephalic  Oral mucosa moist, no icterus, pallor  PERRLA +  Neck supple, no JVD, no lymphadenopathy, no thryomegaly  CHEST: B/L breath sounds heard    CVS: S1, S2   ABDOMEN: Soft/obese/NT/BS heard  NEUROLOGICAL: CN II -XI grossly intact  No focal motor or sensory deficits  No signs of meningeal irritation or cerebellar dysfunction  EXTREMITIES:  Mild bilateral pitting pedal edema      LAB DATA  Results for Tommie Womack (MRN [id number]) as of 1/13/2019 02:59   1/12/2019 23:24 1/12/2019 23:24 1/12/2019 23:25 1/13/2019 00:09 1/13/2019 01:27   eGFR 30       Sodium 146 (H)       Potassium 3 7       Chloride 106       CO2 30       Anion Gap 10       BUN 31 (H)       Creatinine 1 68 (H)       Glucose, Random 118       Calcium 9 4       AST 22       ALT 25       Alkaline Phosphatase 51       Total Protein 6 9       Albumin 3 2 (L)       TOTAL BILIRUBIN 0 50       Magnesium 2 0       Troponin I 0 02       LACTIC ACID 0 7       WBC 6 24       Red Blood Cell Count 3 24 (L)       Hemoglobin 10 7 (L)       HCT 33 5 (L)        (H)       MCH 33 0       MCHC 31 9       RDW 13 8       Platelet Count 644       MPV 11 8       nRBC 0       Neutrophils % 55       Immat GRANS % 1       Lymphocytes Relative 25       Monocytes Relative 16 (H)       Eosinophils 2       Basophils Relative 1       Immature Grans Absolute 0 03       Absolute Neutrophils 3 53       Lymphocytes Absolute 1 56       Absolute Monocytes 0 99       Absolute Eosinophils 0 10       Basophils Absolute 0 03       WBC CLUMPS     occasional   Protime 18 9 (H)       INR 1 61 (H)       PTT 33       TSH 3RD GENERATON 0 882       Epithelial Cells     Occasional   Color, UA     Yellow   Clarity, UA     Clear   SL AMB SPECIFIC GRAVITY_URINE     1 010   Glucose, UA     Negative   Ketones, UA     Negative   Blood, UA     Small (A)   Nitrite, UA     Positive (A)   Leukocytes, UA     Small (A)   pH, UA     8 0   POCT URINE PROTEIN     30 (1+) (A)   Bilirubin, UA     Negative   SL AMB POCT UROBILINOGEN     0 2   RBC, UA     2-4 (A)   WBC, UA     10-20 (A)   Bacteria, UA     Moderate (A)   Ca Oxalate Joan, UA     Occasional (A) BLOOD CULTURE Rpt ((NONE)) Rpt ((NONE))      URINE CULTURE     Rpt ((NONE))   INFLUENZA A/B AND RSV, PCR   Rpt ((NONE))     XR CHEST PA & LATERAL    Rpt        No results found  EKG, Pathology, and Other Studies Reviewed on Admission:  Sinus bradycardia      Total time spent in the process of admission, completion of records, counseling, coordination of care, discussion with patient/family was approximately  28 minutes  Blas Limon MD  HOSPITALIST SERVICES  1/13/2019      PLEASE NOTE:  This encounter was completed utilizing the [employer] Direct Speech Voice Recognition Software  Grammatical errors, random word insertions, pronoun errors and incomplete sentences are occasional consequences of the system due to software limitations, ambient noise and hardware issues  These may be missed by proof reading prior to affixing electronic signature  Any questions or concerns about the content, text or information contained within the body of this dictation should be directly addressed to the physician for clarification  Please do not hesitate to call me directly if you have any any questions or concerns

## 2019-01-13 NOTE — RESPIRATORY THERAPY NOTE
RT Protocol Note  Teetee Jean 68 y o  female MRN: [id number]  Unit/Bed#: -01 Encounter: [id number]    Assessment    Principal Problem:    Urinary tract infection associated with catheterization of urinary tract ([place] [address] )  Active Problems:    Dementia    Seizure disorder (HCC)    Atrial fibrillation (HCC)    Bradycardia    Aortoiliac occlusive disease (HCC)      Home Pulmonary Medications:  Breo  Albuterol PRN  Home Devices/Therapy: Home O2    Past Medical History:   Diagnosis Date    Atrial fibrillation ([place] [address] )     Brain aneurysm     CAD (coronary artery disease)     Cardiac disease     had stents 12 years ago in [facility]    CHF (congestive heart failure) ([place] [address] )     CKD (chronic kidney disease)     [facility]     Dementia     Dementia     Disease of thyroid gland     GERD (gastroesophageal reflux disease)     Hyperlipidemia     Hyperlipidemia     Hypertension     MI (myocardial infarction) ([place] [address] )     Migraine     Renal disorder     Seizures ([place] [address] )     Stroke ([place] [address] )      Social History     Social History    Marital status:      Spouse name: N/A    Number of children: 3    Years of education: N/A     Occupational History    retired      Social History Main Topics    Smoking status: Former Smoker     Packs/day: 5 00     Years: 40 00     Quit date: 9/7/2007    Smokeless tobacco: Never Used    Alcohol use No    Drug use: No    Sexual activity: No     Other Topics Concern    None     Social History Narrative    None       Subjective         Objective    Physical Exam:   Assessment Type: Assess only  General Appearance: Alert, Awake  Respiratory Pattern: Normal  Chest Assessment: Chest expansion symmetrical  Bilateral Breath Sounds: Clear  O2 Device: NC    Vitals:  Blood pressure (!) 185/80, pulse 94, temperature 97 8 °F (36 6 °C), temperature source Oral, resp  rate 16, height 5' (1 524 m), weight 80 6 kg (177 lb 9 6 oz), SpO2 99 %  Imaging and other studies: I have personally reviewed pertinent films in PACS    O2 Device: NC     Plan    Respiratory Plan: Home Bronchodilator Patient pathway        Resp Comments: pt has a history of COPD for which she takes breo daily, pt takes prn albuterol  no adverse breath sounds noted at this time, continue with PRN txs

## 2019-01-13 NOTE — ASSESSMENT & PLAN NOTE
Currently stable no active issues  Patient will continue with carbamazepine, Depakote  Maintain seizure precautions

## 2019-01-13 NOTE — UTILIZATION REVIEW
Initial Clinical Review    Admission: Date/Time/Statement: Observation 1/13 @ 0229 and changed to Inpatient 1/13 @ 5034    Admitting Physician ANN MARIEE    Level of Care Med Surg    Estimated length of stay More than 2 Midnights    Certification I certify that inpatient services are medically necessary for this patient for a duration of greater than two midnights  See H&P and MD Progress Notes for additional information about the patient's course of treatment  ED: Date/Time/Mode of Arrival:   ED Arrival Information     Expected Arrival Acuity Means of Arrival Escorted By Service Admission Type    - 1/12/2019 22:31 Urgent Ambulance [facility] [facility] Hospitalist Urgent    Arrival Complaint    FEVER         Chief Complaint:   Chief Complaint   Patient presents with    Fever - 9 weeks to 74 years     onset this morning, decreased appepatite, chills, nausea  fever 100 6 1 hour had tylenolol now normal temp     History of Illness: 67-year-old lady with complex past medical history as below, came to the hospital for complaints of low-grade fever and generalized weakness for 1 day      ED Vital Signs:   ED Triage Vitals   Temperature Pulse Respirations Blood Pressure SpO2   01/12/19 2240 01/12/19 2240 01/12/19 2240 01/12/19 2240 01/12/19 2240   98 7 °F (37 1 °C) (!) 53 16 125/78 95 %      Temp Source Heart Rate Source Patient Position - Orthostatic VS BP Location FiO2 (%)   01/12/19 2240 01/12/19 2240 01/12/19 2240 01/12/19 2240 --   Oral Monitor Sitting Right arm       Pain Score       01/13/19 0319       No Pain        Wt Readings from Last 1 Encounters:   01/13/19 80 6 kg (177 lb 9 6 oz)     O2 2L N/C     Vital Signs (abnormal): HR = 54, 54    Pertinent Labs/Diagnostic Test Results:   CXR - No acute cardiopulmonary disease    Na = 146  H/H = 10 5/ 32 8    Leukocytes, UA Negative Small     Nitrite, UA Negative Positive     Protein, UA Negative mg/dl 30 (1+)     Urobilinogen, UA 0 2, 1 0 E U /dl E U /dl 0 2    Bilirubin, UA Negative Negative    Blood, UA Negative Small       01/13/19 0127     RBC, UA None Seen, 0-5 /hpf 2-4     WBC, UA None Seen, 0-5, 5-55, 5-65 /hpf 10-20     Epithelial Cells None Seen, Occasional /hpf Occasional    Bacteria, UA None Seen, Occasional /hpf Moderate     Ca Oxalate Joan, UA None Seen /hpf Occasional      ED Treatment:   Medication Administration from 01/12/2019 2230 to 01/13/2019 0335       Date/Time Order Dose Route Action     01/12/2019 2343 sodium chloride 0 9 % bolus 500 mL 500 mL Intravenous New Bag     01/13/2019 0234 cefTRIAXone (ROCEPHIN) 1,000 mg in dextrose 5 % 50 mL IVPB 1,000 mg Intravenous New Bag        Past Medical/Surgical History:    Active Ambulatory Problems     Diagnosis Date Noted    HTN (hypertension) 12/10/2017    COPD (chronic obstructive pulmonary disease) ([facility] ) 12/10/2017    Chronic diastolic congestive heart failure ([facility] ) 12/10/2017    Atrial flutter ([facility] ) 03/22/2018    Back pain 03/22/2018    Dementia 03/23/2018    Hyperlipidemia 03/23/2018    Urinary tract infection associated with catheter[facility] of urinary tract ([facility] ) 03/24/2018    Seizure disorder ([facility] ) 03/24/2018    RSV infection 03/24/2018    Ambulatory dysfunction 03/29/2018    Hypernatremia 04/13/2018    Stage 3 chronic kidney disease ([facility] ) 04/13/2018    Lower extremity edema 04/13/2018    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 04/18/2018    JESENIA (obstructive sleep apnea) 05/04/2018    Intermittent claudication of right lower extremity due to atherosclerosis ([facility] ) 05/04/2018    Atrial fibrillation ([facility] ) 05/07/2018    Chronic indwelling Davis catheter 06/17/2018    Bradycardia 06/18/2018    Essential hypertension 09/09/2018    Peripheral artery disease ([facility] ) 09/09/2018    Macrocytic anemia 12/11/2018    Aortoiliac occlusive disease ([facility] ) 01/07/2019     Resolved Ambulatory Problems     Diagnosis Date Noted    Cystitis 12/10/2017    Elevated troponin 12/10/2017    TAM (acute kidney injury) ([facility] ) 12/10/2017    Chronic respiratory failure ([place] [address] ) 03/22/2018    SOB (shortness of breath) 06/17/2018    Chronic hypoxemic respiratory failure ([place] [address] ) 06/18/2018    Hematoma 09/09/2018    Contrast dye induced nephropathy 10/12/2018     Past Medical History:   Diagnosis Date    Atrial fibrillation ([place] [address] )     Brain aneurysm     CAD (coronary artery disease)     Cardiac disease     [facility]     CKD (chronic kidney disease)     COPD [facility]ch[facility] obstructive pulmonary disease) ([facility]     Dementia     Dementia     Disease of thyroid gland     GERD (gastroesophageal reflux disease)     Hyperlipidemia     Hyperlipidemia     Hypertension     MI (myocardial infarction) ([place] [address] )     Migraine     Renal disorder     Seizures ([place] [address] )     Stroke ([place] [address] )      Admitting Diagnosis: Dehydration [E86 0]  Encephalopathy [G93 40]  Fever [R50 9]  Acute kidney injury ([place] [address] ) [N17 9]  Symptomatic urinary tract infection [N39 0]     Age/Sex: 68 y o  female     Assessment/Plan:   80y Female to ED came to the hospital for complaints of low-grade fever and generalized weakness for 1 day  Patient states that since yesterday morning she has been feeling weak with poor appetite  Subjective feeling of chills  Patient has chronic indwelling Davis's catheter for neurogenic bladder that was recently changed on Friday  Patient is accompanied by her daughter at bedside  Around 9:30 p m, patient insisted she be taken to the hospital as she felt more weak  As per the daughter and patient, her condition has deteriorated since her last hospitalization in December 2018  Currently found to have urinary tract infection  Urinary tract infection associated with catheterization of urinary tract (HCC)   Follow-up results of blood culture x2, urine culture  Catheter associated UTI    Continue IV Rocephin for now, titrate antibiotics accordingly to results of cultures  Bradycardia  Maintain telemetry  Check TSH      Aortoiliac occlusive disease ([facility] )   12/2018 S/p angiogram showing initial stent fracture, placement of B TEMO stents, R EIA distal stent extension for dissection    Continue with dual anti-platelet therapy, Coumadin        Admission Orders:  Tele monitoring  Bld culture x2  Seizure precautions  PT/OT eval and treat    Scheduled Meds:   Current Facility-Administered Medications:  allopurinol 300 mg Oral QAM   atorvastatin 10 mg Oral Daily   buPROPion 150 mg Oral Daily   carBAMazepine 100 mg Oral TID   [START ON 1/14/2019] cefTRIAXone 1,000 mg Intravenous Q24H   cholecalciferol 2,000 Units Oral Daily   clopidogrel 75 mg Oral Daily   diltiazem 180 mg Oral Q12H TONI   divalproex sodium 250 mg Oral Daily   docusate sodium 100 mg Oral BID   fenofibrate micronized 67 mg Oral Daily With Breakfast   ferrous sulfate 325 mg Oral BID   fluticasone-vilanterol 1 puff Inhalation Daily   furosemide 40 mg Oral Daily   levothyroxine 88 mcg Oral Early Morning   metoprolol tartrate 50 mg Oral Q12H TONI   potassium chloride 20 mEq Oral Daily   saccharomyces boulardii 250 mg Oral BID   senna 1 tablet Oral Daily   warfarin 5 mg Oral Daily (warfarin)     Continuous Infusions:    PRN Meds:   acetaminophen    albuterol    nitroglycerin    ondansetron

## 2019-01-13 NOTE — PLAN OF CARE
Problem: PHYSICAL THERAPY ADULT  Goal: Performs mobility at highest level of function for planned discharge setting  See evaluation for individualized goals  Treatment/Interventions: Functional transfer training, LE strengthening/ROM, Elevations, Therapeutic exercise, Endurance training, Patient/family training, Bed mobility, Gait training, Spoke to nursing          See flowsheet documentation for full assessment, interventions and recommendations  Prognosis: Good  Problem List: Decreased strength, Decreased endurance, Impaired balance, Decreased mobility, Decreased cognition, Decreased safety awareness  Assessment: pt is a 74y/o f [place] [facility] [place]  also on droplet precautions to r/o influenza  PMH significant for COPD, CHF, dementia, seizure disorder, + a-fib  at baseline, pt mod (I) c functional mobility c rollator  resides at [facility] c 10 steps to access bedroom  pt currently presents c deficits in strength, balance, gait quality, cognition, + activity tolerance noted in PT exam above  Barthel Index 50/100  ambulated 120' c (S) + RW c min verbal cues for safety  remained OOB in chair at end of session c chair alarm activated  would benefit from skilled PT to maximize functional mobility + return to [facility]  upon d/c, recommend pt return to [facility] c PT at next level of care  PT eval of high complexity 2* unstable med status c pt cont to undergo workup 2* UTI  also on droplet precautions to r/o influenza  pt oriented only to person c impaired cognitive status  presents c mobility deficits above requiring (S) for mobility tasks  also c multiple co-morbidities including COPD, CHF, dementia, a-fib, + seizure disorder impacting PT  has 10 steps to access bedroom at [facility]  Barriers to Discharge: Inaccessible home environment     Recommendation: Home PT (return to [facility] c PT)     PT - OK to Discharge: No    See flowsheet documentation for full assessment

## 2019-01-13 NOTE — PHYSICIAN ADVISOR
Current patient class: Inpatient  The patient is currently on Hospital Day: 2 at [address]        The patient was admitted to the hospital  on 1/13/19 at [phone] for the following diagnosis:  Dehydration [E86 0]  Encephalopathy [G93 40]  Fever [R50 9]  Acute kidney injury ([facility] ) [N17 9]  Symptomatic urinary tract infection [N39 0]       There is documentation in the medical record of an expected length of stay of at least 2 midnights  The patient is therefore expected to satisfy the 2 midnight benchmark and given the 2 midnight presumption is appropriate for INPATIENT ADMISSION  Given this expectation of a satisfying stay, CMS instructs us that the patient is most often appropriate for inpatient admission under part A provided medical necessity is documented in the chart  After review of the relevant documentation, labs, vital signs and test results, the patient is appropriate for INPATIENT ADMISSION  Admission to the hospital as an inpatient is a complex decision making process which requires the practitioner to consider the patients presenting complaint, history and physical examination and all relevant testing  With this in mind, in this case, the patient was deemed appropriate for INPATIENT ADMISSION  After review of the documentation and testing available at the time of the admission I concur with this clinical determination of medical necessity  The patient does have an inpatient admission within the previous 30 days  The patient was admitted on 12/20/18 and discharged on 12/20/18 as an inpatient  The patient therefore required readmission review  In this case the patient should be considered a SEPARATE and UNRELATED INPATIENT ADMISSION  The patient had been discharged in stable condition with a completed care plan  There were no unresolved acute medical issues at the time of discharged which would have reasonably been expected to prompt this readmission          Rationale is as follows: The patient is a 68 yrs   Female who presented to the ED at 1/12/2019 10:31 PM with a chief complaint of Fever - 9 weeks to 74 years (onset this morning, decreased appepatite, chills, nausea  fever 100 6 1 hour had tylenolol now normal temp)     The patient had a prior admission from 12/7-12/14 due to PAD, anemia and gait dysfunction  The patient presented with fever and increased weakness  The patient is being admitted with UTI, afib, and dementia  The plan of care includes urine culture, IV abx, telemetry monitoring, TSH  This patient is appropriate for INPATIENT admission; continued hospitalization is necessary to ensure stabilization of her clinical status  This admission is UNRELATED to her prior admission as she was treated and discharged in stable condition       The patients vitals on arrival were ED Triage Vitals   Temperature Pulse Respirations Blood Pressure SpO2   01/12/19 2240 01/12/19 2240 01/12/19 2240 01/12/19 2240 01/12/19 2240   98 7 °F (37 1 °C) (!) 53 16 125/78 95 %      Temp Source Heart Rate Source Patient Position - Orthostatic VS BP Location FiO2 (%)   01/12/19 2240 01/12/19 2240 01/12/19 2240 01/12/19 2240 --   Oral Monitor Sitting Right arm       Pain Score       01/13/19 0319       No Pain           Past Medical History:   Diagnosis Date    Atrial fibrillation ([facility] )     Brain aneurysm     CAD (coronary artery disease)     Cardiac disease     had stents 12 years ago in [address] [place] [address] CH[facility]estive [facility] failure) [facility]     CKD (chronic kidney disease)     COPD (chronic obstructive pulmonary disease) (HCC)     Dementia     Dementia     Disease of thyroid gland     GERD (gastroesophageal reflux disease)     Hyperlipidemia     Hyperlipidemia     Hypertension     MI [facility]myocardial infarction) [facility] )     Migraine     Renal disorder     Seizures ([facility] )     Stroke [facility])      Past Surgical History:   Procedure Laterality Date    APPENDECTOMY      ARTERIOGRAM N/A 12/7/2018    Procedure: ARTERIOGRAM WITH STENT PLACEMENT;  Surgeon: Dina Reddy MD;  [facility]: [facility];  Service: Vascular    BLADDER TUMOR EXCISION      BRAIN SURGERY      1998  clip right frontal lobe    CARDIAC SURGERY      COLONOSCOPY      CORONARY STENT PLACEMENT      5 stents    EYE SURGERY      IR ABDOMINAL ANGIOGRAPHY / INTERVENTION  12/7/2018    MANDIBLE FRACTURE SURGERY      TONSILLECTOMY      TUBAL LIGATION      UTERINE FIBROID SURGERY             Consults have been placed to:   IP CONSULT TO CASE MANAGEMENT    Vitals:    01/13/19 0600 01/13/19 0708 01/13/19 1122 01/13/19 1500   BP:  147/83 (!) 175/72 (!) 174/73   BP Location:  Left arm Left arm Left arm   Pulse:  (!) 52 60 56   Resp:  18 18 20   Temp:  97 9 °F (36 6 °C) 98 3 °F (36 8 °C) 98 2 °F (36 8 °C)   TempSrc:  Oral Oral Oral   SpO2:  98% 92% 93%   Weight: 80 6 kg (177 lb 9 6 oz)      Height:           Most recent labs:    Recent Labs      01/12/19   2324  01/13/19   0605   WBC  6 24  4 76   HGB  10 7*  10 5*   HCT  33 5*  32 8*   PLT  157  130*   K  3 7  3 6   CALCIUM  9 4  9 1   BUN  31*  31*   CREATININE  1 68*  1 42*   INR  1 61*  1 66*   TROPONINI  0 02   --    AST  22  19   ALT  25  24   ALKPHOS  51  50       Scheduled Meds:  Current Facility-Administered Medications:  acetaminophen 650 mg Oral Q6H PRN Cande Hubbard MD    albuterol 2 5 mg Nebulization Q4H PRN Cande Hubbard MD    allopurinol 300 mg Oral QAM Cande Lucas MD    atorvastatin 10 mg Oral Daily Cande Hadley MD    buPROPion 150 mg Oral Daily Cande Hadley MD    carBAMazepine 100 mg Oral TID Dhruv Stearns MD    cefTAZidime 500 mg Intravenous Q12H Belkis Meléndez MD Last Rate: 500 mg (01/13/19 1359)   cholecalciferol 2,000 Units Oral Daily Cande Hubbard MD    clopidogrel 75 mg Oral Daily Cande Hubbard MD    diltiazem 180 mg Oral Q12H [address] Cande Hubbard MD    divalproex sodium 250 mg Oral Daily Dhruv Stearns MD docusate sodium 100 mg Oral BID Radha Mendoza MD    fenofibrate micronized 67 mg Oral Daily With Breakfast Radha Mendoza MD    ferrous sulfate 325 mg Oral BID Radha Mendoza MD    fluticasone-vilanterol 1 puff Inhalation Daily Radha Mendoza MD    furosemide 40 mg Oral Daily Radha Mendoza MD    hydrALAZINE 10 mg Intravenous Q6H PRN Lee Lewis MD    levothyroxine 88 mcg Oral Early Morning Radha Mendoza MD    metoprolol tartrate 50 mg Oral Q12H [facility] Kat Noonan MD    nitroglycerin 0 4 mg Sublingual Q5 Min PRN Radha Mendoza MD    ondansetron 4 mg Intravenous Q6H PRN Radha Mendoza MD    potassium chloride 20 mEq Oral Daily Radha Mendoza MD    saccharomyces boulardii 250 mg Oral BID Radha Mendoza MD    senna 1 tablet Oral Daily Radha Mendoza MD    warfarin 5 mg Oral Daily (warfarin) Radha Mendoza MD      Continuous Infusions:   PRN Meds:   acetaminophen    albuterol    hydrALAZINE    nitroglycerin    ondansetron    Surgical procedures (if appropriate):

## 2019-01-13 NOTE — ED PROVIDER NOTES
History  Chief Complaint   Patient presents with    Fever - 9 weeks to 74 years     onset this morning, decreased appepatite, chills, nausea  fever 100 6 1 hour had tylenolol now normal temp     52-year-old female with multiple medical problems including atrial flutter on Coumadin, COPD, dementia, recent endovascular intervention for peripheral vascular disease with endovascular complication, from gets personal care home with fever and generalized weakness  Patient is here with her daughter she reports the patient is a poor historian secondary to her underlying dementia and she has had progressive decline recently although that throughout the day today she has had gradual decline she has had very poor p o  Intake, she is not quite herself but she does wax and wane, no urine output and she reportedly had a temperature at the personal care home and was sent over for generalized weakness  On review of systems the daughter notes a cough which is otherwise at baseline, she also notes that her her Baca catheter was changed on Friday and she is concerned about there being minimal urine output but otherwise the patient has not had any other complaints or concerns and denies additional review of systems although limited secondary to her underlying dementia            Prior to Admission Medications   Prescriptions Last Dose Informant Patient Reported? Taking?    Cholecalciferol (VITAMIN D) 2000 units CAPS 1/12/2019 at Unknown time Outside Facility (Specify) No Yes   Sig: Take 1 capsule (2,000 Units total) by mouth daily   Citric Bp-Xtdazwwgzgq-Ff Carb (RENACIDIN) SOLN Past Week at Unknown time Outside Facility (Specify) Yes Yes   Sig: Indications: 60ml to irrigate baca once a week on Friday   Sennosides-Docusate Sodium (SENNA PLUS PO) Past Week at Unknown time Outside Facility (Specify) Yes Yes   Sig: Take by mouth daily as needed   acetaminophen (TYLENOL) 500 mg tablet Past Week at Unknown time Outside Facility (Specify) Yes Yes   Sig: Take 500 mg by mouth every 6 (six) hours as needed for mild pain   albuterol (2 5 mg/3 mL) 0 083 % nebulizer solution Past Week at Unknown time Outside Facility (Specify) No Yes   Sig: Take 1 vial (2 5 mg total) by nebulization every 6 (six) hours as needed for wheezing   albuterol (PROVENTIL HFA,VENTOLIN HFA) 90 mcg/act inhaler Past Week at Unknown time Outside Facility (Specify) No Yes   Sig: Inhale 2 puffs every 6 (six) hours as needed for wheezing   allopurinol (ZYLOPRIM) 300 mg tablet  Outside Facility (Specify) No No   Sig: Take 1 tablet (300 mg total) by mouth every morning for 14 days   atorvastatin (LIPITOR) 10 mg tablet 1/12/2019 at Unknown time Outside Facility (Specify) No Yes   Sig: Take 1 tablet (10 mg total) by mouth daily   betamethasone dipropionate (DIPROSONE) 0 05 % cream Past Month at Unknown time Outside Facility (Specify) Yes Yes   Sig: Apply topically 3 (three) times a day   buPROPion (WELLBUTRIN SR) 150 mg 12 hr tablet 1/12/2019 at Unknown time Outside Facility ([facility]) No Yes   Sig: Take 1 tablet (150 mg total) by mouth daily   carBAMazepine (TEGretol) 100 mg chewable tablet 1/12/2019 at Unknown time Outside Facility (Specify) No Yes   Sig: Chew 1 tablet (100 mg total) 3 (three) times a day   clopidogrel (PLAVIX) 75 mg tablet 1/12/2019 at Unknown time Outside Facility (Specify) No Yes   Sig: Take 1 tablet (75 mg total) by mouth daily   clotrimazole (LOTRIMIN) 1 % cream Past Month at Unknown time Outside Facility (Specify) Yes Yes   Sig: Apply topically 3 (three) times a day   diltiazem (CARDIZEM SR) 90 mg 12 hr capsule 1/12/2019 at Unknown time Outside Facility (Specify) No Yes   Sig: Take 2 capsules (180 mg total) by mouth every 12 (twelve) hours   divalproex sodium (DEPAKOTE ER) 250 mg 24 hr tablet 1/12/2019 at Unknown time Outside Facility (Specify) No Yes   Sig: Take 1 tablet (250 mg total) by mouth daily   Patient taking differently: Take 250 mg by mouth 2 (two) times a day     fenofibrate micronized (LOFIBRA) 67 MG capsule 1/12/2019 at Unknown time Outside Facility (Specify) No Yes   Sig: Take 1 capsule (67 mg total) by mouth daily with breakfast   ferrous sulfate 325 (65 Fe) mg tablet 1/12/2019 at Unknown time Outside Facility (Specify) No Yes   Sig: Take 1 tablet (325 mg total) by mouth 2 (two) times a day   fluticasone-vilanterol (BREO ELLIPTA) 100-25 mcg/inh inhaler 1/12/2019 at Unknown time Outside Facility (Specify) No Yes   Sig: Inhale 1 puff daily   furosemide (LASIX) 40 mg tablet 1/12/2019 at Unknown time Outside Facility (Specify) No Yes   Sig: Take 1 tablet (40 mg total) by mouth daily   levothyroxine 88 mcg tablet 1/12/2019 at Unknown time Outside Facility (Specify) No Yes   Sig: Take 1 tablet (88 mcg total) by mouth daily   Patient taking differently: Take 100 mcg by mouth daily     metoprolol tartrate (LOPRESSOR) 50 mg tablet 1/13/2019 at Unknown time Outside Facility (Specify) No Yes   Sig: Take 1 tablet (50 mg total) by mouth every 12 (twelve) hours   nitroglycerin (NITROSTAT) 0 4 mg SL tablet  Outside Facility (Specify) No Yes   Sig: Place 1 tablet (0 4 mg total) under the tongue every 5 (five) minutes as needed for chest pain   potassium chloride (K-DUR,KLOR-CON) 20 mEq tablet 1/12/2019 at Unknown time Outside Facility (Specify) No Yes   Sig: Take 1 tablet (20 mEq total) by mouth daily   warfarin (COUMADIN) 3 mg tablet 1/12/2019 at Unknown time Outside Facility (Specify) No Yes   Sig: One tablet daily or as otherwise directed        Facility-Administered Medications: None       Past Medical History:   Diagnosis Date    Atrial fibrillation ([facility] [address] [facility]     Brain aneurysm     CAD (coronary artery disease)     Cardiac disease     had stents 12 years ago in [address] [place] [address] CH[facility]congestive heart failure) ([facility])     CKD (chronic kidney disease)     COPD (chronic obstructive pulmonary disease) (HCC)     Dementia     Dementia     Disease of thyroid gland     GERD (gastroesophageal reflux disease)     Hyperlipidemia     Hyperlipidemia     Hypertension     [facility] infarction) [facility] [address] [facility]     Migraine     Renal disorder     Seizures ([facility] [address] )     Stroke [facility])        Past Surgical History:   Procedure Laterality Date    APPENDECTOMY      ARTERIOGRAM N/A 12/7/2018    Procedure: ARTERIOGRAM WITH STENT PLACEMENT;  Surgeon: Laura Reddy MD;  [facility]: [facility];  Service: Vascular    BLADDER TUMOR EXCISION      BRAIN SURGERY      1998  clip right frontal lobe    CARDIAC SURGERY      COLONOSCOPY      CORONARY STENT PLACEMENT      5 stents    EYE SURGERY      IR ABDOMINAL ANGIOGRAPHY / INTERVENTION  12/7/2018    MANDIBLE FRACTURE SURGERY      TONSILLECTOMY      TUBAL LIGATION      UTERINE FIBROID SURGERY         Family History   Problem Relation Age of Onset    Heart disease Father     Parkinsonism Father     Macular degeneration Mother     Glaucoma Mother     Diabetes Brother     Heart disease Brother      I have reviewed and agree with the history as documented  Social History   Substance Use Topics    Smoking status: Former Smoker     Packs/day: 5 00     Years: 40 00     Quit date: 9/7/2007    Smokeless tobacco: Never Used    Alcohol use No        Review of Systems   Unable to perform ROS: Dementia   Constitutional: Positive for activity change, appetite change and fatigue  Negative for chills and fever  HENT: Negative for rhinorrhea and sore throat  Eyes: Negative for redness and visual disturbance  Respiratory: Positive for cough  Negative for shortness of breath  Cardiovascular: Negative for chest pain and palpitations  Gastrointestinal: Negative for abdominal pain, diarrhea, nausea and vomiting  Genitourinary: Positive for decreased urine volume  Negative for flank pain and hematuria  Change in urine output   Musculoskeletal: Negative for arthralgias, back pain and myalgias     Skin: Negative for color change, rash and wound  Neurological: Negative for dizziness, weakness and headaches  Hematological: Negative for adenopathy  Does not bruise/bleed easily  Psychiatric/Behavioral: Positive for confusion  Negative for agitation and behavioral problems  All other systems reviewed and are negative  Physical Exam  Physical Exam   Constitutional: She appears well-developed and well-nourished  No distress  Patient appears comfortable lying in bed in no acute distress   HENT:   Head: Normocephalic and atraumatic  Right Ear: External ear normal    Left Ear: External ear normal    Eyes: Pupils are equal, round, and reactive to light  EOM are normal    Neck: Normal range of motion  Neck supple  No tracheal deviation present  Cardiovascular: Normal rate, regular rhythm and normal heart sounds  Exam reveals no gallop and no friction rub  No murmur heard  Pulmonary/Chest: Effort normal  She has wheezes  She has no rales  Abdominal: Soft  Bowel sounds are normal  She exhibits no distension  There is no tenderness  There is no rebound and no guarding  Patient has no abdominal tenderness soft nontender nondistended her groin sites are clean dry and intact   Genitourinary:   Genitourinary Comments: Davis catheter in place with dark concentrated urine   Musculoskeletal: Normal range of motion  She exhibits no edema or tenderness  No acute ischemic infectious inflammatory traumatic findings on exam she is warm well perfused lower extremities with palpable pulses   Neurological: She is alert  No cranial nerve deficit  She exhibits normal muscle tone  Coordination normal    Awake alert oriented times self and place cranial nerves 2-12 were grossly intact muscle strength is symmetric   Skin: Skin is warm and dry  No rash noted  Psychiatric: She has a normal mood and affect  Her behavior is normal    Nursing note and vitals reviewed        Vital Signs  ED Triage Vitals   Temperature Pulse Respirations Blood Pressure SpO2   01/12/19 2240 01/12/19 2240 01/12/19 2240 01/12/19 2240 01/12/19 2240   98 7 °F (37 1 °C) (!) 53 16 125/78 95 %      Temp Source Heart Rate Source Patient Position - Orthostatic VS BP Location FiO2 (%)   01/12/19 2240 01/12/19 2240 01/12/19 2240 01/12/19 2240 --   Oral Monitor Sitting Right arm       Pain Score       01/13/19 0319       No Pain           Vitals:    01/13/19 0708 01/13/19 1122 01/13/19 1500 01/13/19 1927   BP: 147/83 (!) 175/72 (!) 174/73 134/95   Pulse: (!) 52 60 56 57   Patient Position - Orthostatic VS: Lying Sitting Sitting Lying       Visual Acuity      ED Medications  Medications   allopurinol (ZYLOPRIM) tablet 300 mg (300 mg Oral Given 1/13/19 0929)   atorvastatin (LIPITOR) tablet 10 mg (10 mg Oral Given 1/13/19 0926)   buPROPion (WELLBUTRIN SR) 12 hr tablet 150 mg (150 mg Oral Given 1/13/19 0927)   carBAMazepine (TEGretol) chewable tablet 100 mg (100 mg Oral Given 1/13/19 1934)   cholecalciferol (VITAMIN D3) tablet 2,000 Units (2,000 Units Oral Given 1/13/19 0926)   clopidogrel (PLAVIX) tablet 75 mg (75 mg Oral Given 1/13/19 0926)   diltiazem (CARDIZEM SR) 12 hr capsule 180 mg (180 mg Oral Given 1/13/19 0927)   divalproex sodium (DEPAKOTE ER) 24 hr tablet 250 mg (250 mg Oral Given 1/13/19 0927)   fenofibrate micronized (LOFIBRA) capsule 67 mg (67 mg Oral Not Given 1/13/19 1004)   ferrous sulfate tablet 325 mg (325 mg Oral Given 1/13/19 1934)   fluticasone-vilanterol (BREO ELLIPTA) 100-25 mcg/inh inhaler 1 puff (1 puff Inhalation Given 1/13/19 0927)   furosemide (LASIX) tablet 40 mg (40 mg Oral Given 1/13/19 0927)   levothyroxine tablet 88 mcg (88 mcg Oral Given 1/13/19 0718)   metoprolol tartrate (LOPRESSOR) tablet 50 mg (50 mg Oral Given 1/13/19 0926)   nitroglycerin (NITROSTAT) SL tablet 0 4 mg (not administered)   potassium chloride (K-DUR,KLOR-CON) CR tablet 20 mEq (20 mEq Oral Given 1/13/19 0926)   saccharomyces boulardii (FLORASTOR) capsule 250 mg (250 mg Oral Given 1/13/19 1934) docusate sodium (COLACE) capsule 100 mg (100 mg Oral Given 1/13/19 1934)   senna (SENOKOT) tablet 8 6 mg (8 6 mg Oral Given 1/13/19 0926)   ondansetron (ZOFRAN) injection 4 mg (not administered)   acetaminophen (TYLENOL) tablet 650 mg (650 mg Oral Given 1/13/19 1617)   albuterol inhalation solution 2 5 mg (not administered)   warfarin (COUMADIN) tablet 5 mg (5 mg Oral Given 1/13/19 1937)   hydrALAZINE (APRESOLINE) injection 10 mg (not administered)   cefTAZidime (FORTAZ) 500 mg in sodium chloride 0 9 % 50 mL IVPB (500 mg Intravenous New Bag 1/13/19 1359)   sodium chloride 0 9 % bolus 500 mL (0 mL Intravenous Stopped 1/13/19 0308)   cefTRIAXone (ROCEPHIN) 1,000 mg in dextrose 5 % 50 mL IVPB (0 mg Intravenous Stopped 1/13/19 0308)       Diagnostic Studies  Results Reviewed     Procedure Component Value Units Date/Time    Influenza A/B and RSV by PCR [[id number]]  (Normal) Collected:  01/12/19 2325    Lab Status:  Final result Specimen:  Nasopharyngeal from Nasopharyngeal Swab Updated:  01/13/19 2023     INFLU A PCR None Detected     INFLU B PCR None Detected     RSV PCR None Detected    Carbamazepine level, total [[id number]]  (Normal) Collected:  01/13/19 0605    Lab Status:  Final result Specimen:  Blood from Arm, Left Updated:  01/13/19 1648     Carbamazepine Lvl 9 0 ug/mL     Valproic acid level, total [[id number]]  (Abnormal) Collected:  01/13/19 0605    Lab Status:  Final result Specimen:  Blood from Arm, Left Updated:  01/13/19 1648     Valproic Acid, Total 32 (L) ug/mL     Procalcitonin [[id number]]  (Normal) Collected:  01/12/19 2324    Lab Status:  Final result Specimen:  Blood from Arm, Right Updated:  01/13/19 1644     Procalcitonin 0 23 ng/ml     Comprehensive metabolic panel [[id number]]  (Abnormal) Collected:  01/13/19 0605    Lab Status:  Final result Specimen:  Blood from Arm, Left Updated:  01/13/19 0640     Sodium 146 (H) mmol/L      Potassium 3 6 mmol/L      Chloride 109 (H) mmol/L      CO2 28 mmol/L ANION GAP 9 mmol/L      BUN 31 (H) mg/dL      Creatinine 1 42 (H) mg/dL      Glucose 96 mg/dL      Calcium 9 1 mg/dL      AST 19 U/L      ALT 24 U/L      Alkaline Phosphatase 50 U/L      Total Protein 6 8 g/dL      Albumin 3 0 (L) g/dL      Total Bilirubin 0 40 mg/dL      eGFR 37 ml/min/1 73sq m     Narrative:         National Kidney Disease Education Program recommendations are as follows:  GFR calculation is accurate only with a steady state creatinine  Chronic Kidney disease less than 60 ml/min/1 73 sq  meters  Kidney failure less than 15 ml/min/1 73 sq  meters      Magnesium [[id number]]  (Normal) Collected:  01/13/19 0605    Lab Status:  Final result Specimen:  Blood from Arm, Left Updated:  01/13/19 0640     Magnesium 2 2 mg/dL     Phosphorus [[id number]]  (Normal) Collected:  01/13/19 0605    Lab Status:  Final result Specimen:  Blood from Arm, Left Updated:  01/13/19 0640     Phosphorus 3 8 mg/dL     Protime-INR [[id number]]  (Abnormal) Collected:  01/13/19 0605    Lab Status:  Final result Specimen:  Blood from Arm, Left Updated:  01/13/19 1651     Protime 19 4 (H) seconds      INR 1 66 (H)    CBC and differential [[id number]]  (Abnormal) Collected:  01/13/19 0605    Lab Status:  Final result Specimen:  Blood from Arm, Left Updated:  01/13/19 0617     WBC 4 76 Thousand/uL      RBC 3 16 (L) Million/uL      Hemoglobin 10 5 (L) g/dL      Hematocrit 32 8 (L) %       (H) fL      MCH 33 2 pg      MCHC 32 0 g/dL      RDW 13 6 %      MPV 11 0 fL      Platelets 705 (L) Thousands/uL      nRBC 0 /100 WBCs      Neutrophils Relative 54 %      Immat GRANS % 0 %      Lymphocytes Relative 28 %      Monocytes Relative 16 (H) %      Eosinophils Relative 2 %      Basophils Relative 0 %      Neutrophils Absolute 2 52 Thousands/µL      Immature Grans Absolute 0 02 Thousand/uL      Lymphocytes Absolute 1 34 Thousands/µL      Monocytes Absolute 0 78 Thousand/µL      Eosinophils Absolute 0 08 Thousand/µL      Basophils Absolute 0 02 Thousands/µL     Urine Microscopic [[id number]]  (Abnormal) Collected:  01/13/19 0127    Lab Status:  Final result Specimen:  Urine from Urine, Indwelling Davis Catheter Updated:  01/13/19 0150     RBC, UA 2-4 (A) /hpf      WBC, UA 10-20 (A) /hpf      Epithelial Cells Occasional /hpf      Bacteria, UA Moderate (A) /hpf      Ca Oxalate Joan, UA Occasional (A) /hpf      WBC Clumps occasional     Urine culture [[id number]] Collected:  01/13/19 0127    Lab Status: In process Specimen:  Urine from Urine, Indwelling Davis Catheter Updated:  01/13/19 0149    UA w Reflex to Microscopic w Reflex to Culture [[id number]]  (Abnormal) Collected:  01/13/19 0127    Lab Status:  Final result Specimen:  Urine from Urine, Indwelling Davis Catheter Updated:  01/13/19 0136     Color, UA Yellow     Clarity, UA Clear     Specific Gravity, UA 1 010     pH, UA 8 0     Leukocytes, UA Small (A)     Nitrite, UA Positive (A)     Protein, UA 30 (1+) (A) mg/dl      Glucose, UA Negative mg/dl      Ketones, UA Negative mg/dl      Urobilinogen, UA 0 2 E U /dl      Bilirubin, UA Negative     Blood, UA Small (A)    TSH [[id number]]  (Normal) Collected:  01/12/19 2324    Lab Status:  Final result Specimen:  Blood from Arm, Right Updated:  01/13/19 0008     TSH 3RD GENERATON 0 882 uIU/mL     Narrative:         Patients undergoing fluorescein dye angiography may retain small amounts of fluorescein in the body for 48-72 hours post procedure  Samples containing fluorescein can produce falsely depressed TSH values  If the patient had this procedure,a specimen should be resubmitted post fluorescein clearance            The recommended reference ranges for TSH during pregnancy are as follows:  First trimester 0 1 to 2 5 uIU/mL  Second trimester  0 2 to 3 0 uIU/mL  Third trimester 0 3 to 3 0 uIU/m      Magnesium [[id number]]  (Normal) Collected:  01/12/19 2324    Lab Status:  Final result Specimen:  Blood from Arm, Right Updated:  01/13/19 0008 Magnesium 2 0 mg/dL     Lactic acid, plasma [[id number]]  (Normal) Collected:  01/12/19 2324    Lab Status:  Final result Specimen:  Blood from Arm, Right Updated:  01/13/19 0004     LACTIC ACID 0 7 mmol/L     Narrative:         Result may be elevated if tourniquet was used during collection  Troponin I [[id number]]  (Normal) Collected:  01/12/19 2324    Lab Status:  Final result Specimen:  Blood from Arm, Right Updated:  01/13/19 0004     Troponin I 0 02 ng/mL     Comprehensive metabolic panel [[id number]]  (Abnormal) Collected:  01/12/19 2324    Lab Status:  Final result Specimen:  Blood from Arm, Right Updated:  01/13/19 0000     Sodium 146 (H) mmol/L      Potassium 3 7 mmol/L      Chloride 106 mmol/L      CO2 30 mmol/L      ANION GAP 10 mmol/L      BUN 31 (H) mg/dL      Creatinine 1 68 (H) mg/dL      Glucose 118 mg/dL      Calcium 9 4 mg/dL      AST 22 U/L      ALT 25 U/L      Alkaline Phosphatase 51 U/L      Total Protein 6 9 g/dL      Albumin 3 2 (L) g/dL      Total Bilirubin 0 50 mg/dL      eGFR 30 ml/min/1 73sq m     Narrative:         National Kidney Disease Education Program recommendations are as follows:  GFR calculation is accurate only with a steady state creatinine  Chronic Kidney disease less than 60 ml/min/1 73 sq  meters  Kidney failure less than 15 ml/min/1 73 sq  meters  Protime-INR [[id number]]  (Abnormal) Collected:  01/12/19 2324    Lab Status:  Final result Specimen:  Blood from Arm, Right Updated:  01/12/19 2356     Protime 18 9 (H) seconds      INR 1 61 (H)    APTT [[id number]]  (Normal) Collected:  01/12/19 2324    Lab Status:  Final result Specimen:  Blood from Arm, Right Updated:  01/12/19 2356     PTT 33 seconds     Blood culture #2 [[id number]] Collected:  01/12/19 2324    Lab Status:   In process Specimen:  Blood from Arm, Left Updated:  01/12/19 2343    CBC and differential [[id number]]  (Abnormal) Collected:  01/12/19 2324    Lab Status:  Final result Specimen:  Blood from Arm, Right Updated:  01/12/19 2338     WBC 6 24 Thousand/uL      RBC 3 24 (L) Million/uL      Hemoglobin 10 7 (L) g/dL      Hematocrit 33 5 (L) %       (H) fL      MCH 33 0 pg      MCHC 31 9 g/dL      RDW 13 8 %      MPV 11 8 fL      Platelets 892 Thousands/uL      nRBC 0 /100 WBCs      Neutrophils Relative 55 %      Immat GRANS % 1 %      Lymphocytes Relative 25 %      Monocytes Relative 16 (H) %      Eosinophils Relative 2 %      Basophils Relative 1 %      Neutrophils Absolute 3 53 Thousands/µL      Immature Grans Absolute 0 03 Thousand/uL      Lymphocytes Absolute 1 56 Thousands/µL      Monocytes Absolute 0 99 Thousand/µL      Eosinophils Absolute 0 10 Thousand/µL      Basophils Absolute 0 03 Thousands/µL     Blood culture #1 [[id number]] Collected:  01/12/19 2324    Lab Status: In process Specimen:  Blood from Arm, Right Updated:  01/12/19 2335                 XR chest 2 views   ED Interpretation by Tona Sherman DO (01/13 0025)   No acute abnormality      Final Result by Gabino Newby MD (01/13 1057)      No acute cardiopulmonary disease              Workstation performed: [id number]                    Procedures  Procedures       Phone Contacts  ED Phone Contact    ED Course  ED Course as of Jan 13 2025   Sat Jan 12, 2019   2252 EKG reviewed sinus bradycardia 50 beats per minute normal access normal intervals nonspecific T-wave abnormality, nonspecific changes from September 2018    2313 Patient difficult IV access, a 20 gauge IV was placed by myself ultrasound guidance using clean technique in her right biceps flushed lab secured    Sun Jan 13, 2019   0150 Bacteria, UA: (!) Moderate   0150 WBC, UA: (!) 10-20                               MDM  CritCare Time    Disposition  Final diagnoses:   Acute kidney injury ([facility] [address] )   Dehydration   Encephalopathy   Symptomatic urinary tract infection     Time reflects when diagnosis was documented in both MDM as applicable and the Disposition within this note     Time User Action Codes Description Comment    1/13/2019  2:10 AM Raford Habermann W Add [N17 9] [facility]     1/13/2019  2:11 AM Raford Habermann W Add [E86 0] Dehydration     1/13/2019  2:11 AM Vikas Waller Add [G93 40] Encephalopathy     1/13/2019  2:12 AM Valerieelijah Vicente Figueredo Gerardo Add [N39 0] Symptomatic urinary tract infection       ED Disposition     ED Disposition Condition Comment    Admit  Case was discussed with Briseida Mabry and the patient's admission status was agreed to be Admission Status: observation status to the service of Dr Briseida Mabry   Follow-up Information    None         Current Discharge Medication List      CONTINUE these medications which have NOT CHANGED    Details   acetaminophen (TYLENOL) 500 mg tablet Take 500 mg by mouth every 6 (six) hours as needed for mild pain      albuterol (2 5 mg/3 mL) 0 083 % nebulizer solution Take 1 vial (2 5 mg total) by nebulization every 6 (six) hours as needed for wheezing  Qty: 1 vial, Refills: 0    Associated Diagnoses: Chronic obstructive pulmonary disease, unspecified COPD type ([facility] );  Chronic hypoxemic respiratory failure (HCC)      albuterol (PROVENTIL HFA,VENTOLIN HFA) 90 mcg/act inhaler Inhale 2 puffs every 6 (six) hours as needed for wheezing  Qty: 1 Inhaler, Refills: 0    Associated Diagnoses: Chronic hypoxemic respiratory failure (HCC)      atorvastatin (LIPITOR) 10 mg tablet Take 1 tablet (10 mg total) by mouth daily  Qty: 30 tablet, Refills: 0    Associated Diagnoses: Other hyperlipidemia      betamethasone dipropionate (DIPROSONE) 0 05 % cream Apply topically 3 (three) times a day      buPROPion (WELLBUTRIN SR) 150 mg 12 hr tablet Take 1 tablet (150 mg total) by mouth daily  Qty: 30 tablet, Refills: 0    Associated Diagnoses: Seizure disorder (HCC)      carBAMazepine (TEGretol) 100 mg chewable tablet Chew 1 tablet (100 mg total) 3 (three) times a day  Qty: 90 tablet, Refills: 0    Associated Diagnoses: Seizure disorder (HCC)      Cholecalciferol (VITAMIN D) 2000 units CAPS Take 1 capsule (2,000 Units total) by mouth daily  Qty: 14 capsule, Refills: 0    Associated Diagnoses: Stage 3 chronic kidney disease (HCC)      Citric Re-Biaabyzrpmz-Hg Carb (RENACIDIN) SOLN Indications: 60ml to irrigate baca once a week on Friday      clopidogrel (PLAVIX) 75 mg tablet Take 1 tablet (75 mg total) by mouth daily  Qty: 90 tablet, Refills: 0    Associated Diagnoses: PVD (peripheral vascular disease) ([facility])      clotrimazole (LOTRIMIN) 1 % cream Apply topically 3 (three) times a day      diltiazem (CARDIZEM SR) 90 mg 12 hr capsule Take 2 capsules (180 mg total) by mouth every 12 (twelve) hours  Qty: 60 capsule, Refills: 0    Associated Diagnoses: Atrial fibrillation (HCC)      divalproex sodium (DEPAKOTE ER) 250 mg 24 hr tablet Take 1 tablet (250 mg total) by mouth daily  Qty: 30 tablet, Refills: 0    Associated Diagnoses: Mood disorder as late effect of cerebrovascular accident (CVA)      fenofibrate micronized (LOFIBRA) 67 MG capsule Take 1 capsule (67 mg total) by mouth daily with breakfast  Qty: 15 capsule, Refills: 0    Associated Diagnoses: Other hyperlipidemia      ferrous sulfate 325 (65 Fe) mg tablet Take 1 tablet (325 mg total) by mouth 2 (two) times a day  Qty: 30 tablet, Refills: 0    Associated Diagnoses: Essential hypertension; Chronic atrial fibrillation (HCC)      fluticasone-vilanterol (BREO ELLIPTA) 100-25 mcg/inh inhaler Inhale 1 puff daily  Qty: 1 Inhaler, Refills: 0    Associated Diagnoses: Chronic obstructive pulmonary disease, unspecified COPD type ([facility] );  Chronic hypoxemic respiratory failure (HCC)      furosemide (LASIX) 40 mg tablet Take 1 tablet (40 mg total) by mouth daily  Qty: 30 tablet, Refills: 11    Associated Diagnoses: Acute on chronic diastolic congestive heart failure (HCC)      levothyroxine 88 mcg tablet Take 1 tablet (88 mcg total) by mouth daily  Qty: 14 tablet, Refills: 0    Associated Diagnoses: Urinary tract infection without hematuria, site unspecified      metoprolol tartrate (LOPRESSOR) 50 mg tablet Take 1 tablet (50 mg total) by mouth every 12 (twelve) hours  Qty: 60 tablet, Refills: 0    Associated Diagnoses: Chronic atrial fibrillation (HCC)      nitroglycerin (NITROSTAT) 0 4 mg SL tablet Place 1 tablet (0 4 mg total) under the tongue every 5 (five) minutes as needed for chest pain  Qty: 90 tablet, Refills: 0    Associated Diagnoses: Chronic diastolic congestive heart failure (HCC)      potassium chloride (K-DUR,KLOR-CON) 20 mEq tablet Take 1 tablet (20 mEq total) by mouth daily  Qty: 15 tablet, Refills: 0    Associated Diagnoses: Acute on chronic diastolic congestive heart failure (HCC)      Sennosides-Docusate Sodium (SENNA PLUS PO) Take by mouth daily as needed      warfarin (COUMADIN) 3 mg tablet One tablet daily or as otherwise directed  Qty: 30 tablet, Refills: 0    Comments: Patient is to stop the Eliquis due to interaction with another medication she is currently taking  Associated Diagnoses: Paroxysmal atrial fibrillation (HCC)      allopurinol (ZYLOPRIM) 300 mg tablet Take 1 tablet (300 mg total) by mouth every morning for 14 days  Qty: 14 tablet, Refills: 0    Associated Diagnoses: Bilateral leg edema         STOP taking these medications       Probiotic Product (ACIDOPHILUS/BIFIDUS PO) Comments:   Reason for Stopping:             No discharge procedures on file      ED Provider  Electronically Signed by           Susi Brown DO  01/13/19 2025

## 2019-01-13 NOTE — PHYSICAL THERAPY NOTE
PT Evaluation (19min)  (9:25-9:44)    Past Medical History:   Diagnosis Date    [facility]     Brain aneurysm     CAD (coronary artery disease)     Cardiac disease     had stents 12 years ago in [address] [place] [address] CHF (congestive heart failure) ([facility])     CKD (chronic kidney disease)     COPD (chronic obstructive pulmonary disease) (HCC)     Dementia     Dementia     Disease of thyroid gland     GERD (gastroesophageal reflux disease)     Hyperlipidemia     Hyperlipidemia     Hypertension     MI (myocardial infarction) ([place] [address] )     Migraine     Renal disorder     Seizures ([place] [address] )     Stroke ([place] [address] )         01/13/19 0944   Note Type   Note type Eval only   Pain Assessment   Pain Assessment No/denies pain   Home Living   Type of Home Assisted living  [facility])   Home Layout Two level  (bedroom on 2nd floor c 10 steps to access)   Home Equipment Other (Comment)  (rollator)   Prior Function   Level of [place] Needs assistance with ADLs and functional mobility  (ambulates c rollator)   Receives Help From Other (Comment)  ([facility] staff)   ADL Assistance Needs assistance   IADLs Needs assistance   Falls in the last 6 months 0   Restrictions/Precautions   Other Precautions Bed Alarm;Cognitive; Chair Alarm;Droplet precautions; Fall Risk;Multiple lines   General   Additional Pertinent History pt presents to [facility] c UTI  currently on droplet precautions to r/o influenza  PT consulted for mobility + d/c planning  up c (A)  Family/Caregiver Present No   Cognition   Orientation Level Oriented to person   RUE Assessment   RUE Assessment WFL  (4-/5)   LUE Assessment   LUE Assessment WFL  (4-/5)   RLE Assessment   RLE Assessment WFL  (4-/5)   LLE Assessment   LLE Assessment WFL  (4-/5)   Coordination   Sensation WFL   Bed Mobility   Supine to Sit Unable to assess  (pt OOB in chair)   Transfers   Sit to Stand 5  Supervision   Additional items Armrests; Verbal cues; Increased time required   Stand to Sit 5 Supervision   Additional items Armrests; Verbal cues; Increased time required   Ambulation/Elevation   Gait pattern Decreased foot clearance; Inconsistent randi   Gait Assistance 5  Supervision   Additional items Verbal cues   Assistive Device Rolling walker   Distance 120'   Balance   Static Sitting Good   Dynamic Sitting Good   Static Standing Fair   Dynamic Standing Fair   Ambulatory Fair   Activity Tolerance   Activity Tolerance Patient limited by fatigue   Nurse Made Aware Ed   Assessment   Prognosis Good   Problem List Decreased strength;Decreased endurance; Impaired balance;Decreased mobility; Decreased cognition;Decreased safety awareness   Assessment pt is a 72y/o f who [place] [facility] [place]  also on droplet precautions to r/o influenza  PMH significant for COPD, CHF, dementia, seizure disorder, + a-fib  at baseline, pt mod (I) c functional mobility c rollator  resides at [facility] c 10 steps to access bedroom  pt currently presents c deficits in strength, balance, gait quality, cognition, + activity tolerance noted in PT exam above  Barthel Index 50/100  ambulated 120' c (S) + RW c min verbal cues for safety  remained OOB in chair at end of session c chair alarm activated  would benefit from skilled PT to maximize functional mobility + return to [facility]  upon d/c, recommend pt return to [facility] c PT at next level of care  PT eval of high complexity 2* unstable med status c pt cont to undergo workup 2* UTI  also on droplet precautions to r/o influenza  pt oriented only to person c impaired cognitive status  presents c mobility deficits above requiring (S) for mobility tasks  also c multiple co-morbidities including COPD, CHF, dementia, a-fib, + seizure disorder impacting PT  has 10 steps to access bedroom at [facility]  Barriers to Discharge Inaccessible home environment   Goals   Patient Goals "to go up and down the stairs at [place]"  [facility] Expiration Date 01/23/19   Short Term Goal #1 1   increase strength 1/2 grade to improve overall functional mobility, 2  perform transfers mod (I) to safely perform ADLs, 3  ambulate >300' mod (I) c RW to safely navigate hallways at [facility], 4  negotiate 10 stairs mod (I) to safely access bedroom   Plan   Treatment/Interventions Functional transfer training;LE strengthening/ROM; Elevations; Therapeutic exercise; Endurance training;Patient/family training;Bed mobility;Gait training;Spoke to nursing   PT Frequency Other (Comment)  (3-5x/wk)   Recommendation   Recommendation Home PT  (return to [facility] c PT)   PT - OK to Discharge No  (needs to negotiate stairs to access bedroom)   Barthel Index   Feeding 10   Bathing 0   Grooming Score 0   Dressing Score 5   Bladder Score 0   Bowels Score 10   Toilet Use Score 5   Transfers (Bed/Chair) Score 10   Mobility (Level Surface) Score 10   Stairs Score 0   Barthel Index Score 50     Ashlie Nascimento, PT, DPT

## 2019-01-13 NOTE — ASSESSMENT & PLAN NOTE
12/2018 S/p angiogram showing initial stent fracture, placement of B TEMO stents, R EIA distal stent extension for dissection  Continue with dual anti-platelet therapy, Coumadin

## 2019-01-13 NOTE — ED NOTES
Family reports that she had recent stents placed with complication, had baca replaced on Friday- concern of infection     Talat Pain, RN  01/12/19 6309

## 2019-01-14 LAB
ANION GAP SERPL CALCULATED.3IONS-SCNC: 11 MMOL/L (ref 4–13)
BASOPHILS # BLD AUTO: 0.02 THOUSANDS/ΜL (ref 0–0.1)
BASOPHILS NFR BLD AUTO: 0 % (ref 0–1)
BUN SERPL-MCNC: 29 MG/DL (ref 5–25)
CALCIUM SERPL-MCNC: 9.4 MG/DL (ref 8.3–10.1)
CHLORIDE SERPL-SCNC: 108 MMOL/L (ref 100–108)
CO2 SERPL-SCNC: 27 MMOL/L (ref 21–32)
CREAT SERPL-MCNC: 1.35 MG/DL (ref 0.6–1.3)
EOSINOPHIL # BLD AUTO: 0.13 THOUSAND/ΜL (ref 0–0.61)
EOSINOPHIL NFR BLD AUTO: 3 % (ref 0–6)
ERYTHROCYTE [DISTWIDTH] IN BLOOD BY AUTOMATED COUNT: 13.2 % (ref 11.6–15.1)
GFR SERPL CREATININE-BSD FRML MDRD: 39 ML/MIN/1.73SQ M
GLUCOSE SERPL-MCNC: 101 MG/DL (ref 65–140)
HCT VFR BLD AUTO: 34.1 % (ref 34.8–46.1)
HGB BLD-MCNC: 11 G/DL (ref 11.5–15.4)
IMM GRANULOCYTES # BLD AUTO: 0.02 THOUSAND/UL (ref 0–0.2)
IMM GRANULOCYTES NFR BLD AUTO: 0 % (ref 0–2)
INR PPP: 1.82 (ref 0.86–1.17)
LYMPHOCYTES # BLD AUTO: 1.38 THOUSANDS/ΜL (ref 0.6–4.47)
LYMPHOCYTES NFR BLD AUTO: 30 % (ref 14–44)
MCH RBC QN AUTO: 33.1 PG (ref 26.8–34.3)
MCHC RBC AUTO-ENTMCNC: 32.3 G/DL (ref 31.4–37.4)
MCV RBC AUTO: 103 FL (ref 82–98)
MONOCYTES # BLD AUTO: 0.89 THOUSAND/ΜL (ref 0.17–1.22)
MONOCYTES NFR BLD AUTO: 19 % (ref 4–12)
NEUTROPHILS # BLD AUTO: 2.22 THOUSANDS/ΜL (ref 1.85–7.62)
NEUTS SEG NFR BLD AUTO: 48 % (ref 43–75)
NRBC BLD AUTO-RTO: 0 /100 WBCS
PLATELET # BLD AUTO: 145 THOUSANDS/UL (ref 149–390)
PMV BLD AUTO: 11.2 FL (ref 8.9–12.7)
POTASSIUM SERPL-SCNC: 3.9 MMOL/L (ref 3.5–5.3)
PROTHROMBIN TIME: 20.9 SECONDS (ref 11.8–14.2)
RBC # BLD AUTO: 3.32 MILLION/UL (ref 3.81–5.12)
SODIUM SERPL-SCNC: 146 MMOL/L (ref 136–145)
WBC # BLD AUTO: 4.66 THOUSAND/UL (ref 4.31–10.16)

## 2019-01-14 PROCEDURE — 99232 SBSQ HOSP IP/OBS MODERATE 35: CPT | Performed by: GENERAL PRACTICE

## 2019-01-14 PROCEDURE — 97116 GAIT TRAINING THERAPY: CPT

## 2019-01-14 PROCEDURE — 85025 COMPLETE CBC W/AUTO DIFF WBC: CPT | Performed by: GENERAL PRACTICE

## 2019-01-14 PROCEDURE — 85610 PROTHROMBIN TIME: CPT | Performed by: INTERNAL MEDICINE

## 2019-01-14 PROCEDURE — 80048 BASIC METABOLIC PNL TOTAL CA: CPT | Performed by: GENERAL PRACTICE

## 2019-01-14 RX ORDER — FENOFIBRATE 48 MG/1
48 TABLET, COATED ORAL DAILY
Status: DISCONTINUED | OUTPATIENT
Start: 2019-01-14 | End: 2019-01-16 | Stop reason: HOSPADM

## 2019-01-14 RX ADMIN — ACETAMINOPHEN 650 MG: 325 TABLET, FILM COATED ORAL at 15:52

## 2019-01-14 RX ADMIN — Medication 250 MG: at 09:11

## 2019-01-14 RX ADMIN — CLOPIDOGREL BISULFATE 75 MG: 75 TABLET ORAL at 09:11

## 2019-01-14 RX ADMIN — BUPROPION HYDROCHLORIDE 150 MG: 150 TABLET, FILM COATED, EXTENDED RELEASE ORAL at 09:10

## 2019-01-14 RX ADMIN — METOPROLOL TARTRATE 50 MG: 50 TABLET, FILM COATED ORAL at 21:39

## 2019-01-14 RX ADMIN — METOPROLOL TARTRATE 50 MG: 50 TABLET, FILM COATED ORAL at 09:10

## 2019-01-14 RX ADMIN — CEFTAZIDIME 500 MG: 1 INJECTION, POWDER, FOR SOLUTION INTRAMUSCULAR; INTRAVENOUS at 13:06

## 2019-01-14 RX ADMIN — FERROUS SULFATE TAB 325 MG (65 MG ELEMENTAL FE) 325 MG: 325 (65 FE) TAB at 17:50

## 2019-01-14 RX ADMIN — FLUTICASONE FUROATE AND VILANTEROL TRIFENATATE 1 PUFF: 100; 25 POWDER RESPIRATORY (INHALATION) at 09:16

## 2019-01-14 RX ADMIN — VITAMIN D, TAB 1000IU (100/BT) 2000 UNITS: 25 TAB at 09:11

## 2019-01-14 RX ADMIN — CARBAMAZEPINE 100 MG: 100 TABLET, CHEWABLE ORAL at 21:39

## 2019-01-14 RX ADMIN — SENNOSIDES 8.6 MG: 8.6 TABLET, FILM COATED ORAL at 09:11

## 2019-01-14 RX ADMIN — WARFARIN SODIUM 5 MG: 2.5 TABLET ORAL at 17:50

## 2019-01-14 RX ADMIN — CARBAMAZEPINE 100 MG: 100 TABLET, CHEWABLE ORAL at 09:11

## 2019-01-14 RX ADMIN — DILTIAZEM HYDROCHLORIDE 180 MG: 90 CAPSULE, EXTENDED RELEASE ORAL at 21:39

## 2019-01-14 RX ADMIN — DOCUSATE SODIUM 100 MG: 100 CAPSULE, LIQUID FILLED ORAL at 17:50

## 2019-01-14 RX ADMIN — FUROSEMIDE 40 MG: 40 TABLET ORAL at 09:10

## 2019-01-14 RX ADMIN — FENOFIBRATE 48 MG: 48 TABLET, COATED ORAL at 11:39

## 2019-01-14 RX ADMIN — CEFTAZIDIME 500 MG: 1 INJECTION, POWDER, FOR SOLUTION INTRAMUSCULAR; INTRAVENOUS at 23:31

## 2019-01-14 RX ADMIN — ALLOPURINOL 300 MG: 300 TABLET ORAL at 09:11

## 2019-01-14 RX ADMIN — ATORVASTATIN CALCIUM 10 MG: 10 TABLET, FILM COATED ORAL at 09:11

## 2019-01-14 RX ADMIN — DOCUSATE SODIUM 100 MG: 100 CAPSULE, LIQUID FILLED ORAL at 09:11

## 2019-01-14 RX ADMIN — DIVALPROEX SODIUM 500 MG: 250 TABLET, FILM COATED, EXTENDED RELEASE ORAL at 09:10

## 2019-01-14 RX ADMIN — DILTIAZEM HYDROCHLORIDE 180 MG: 90 CAPSULE, EXTENDED RELEASE ORAL at 09:17

## 2019-01-14 RX ADMIN — CARBAMAZEPINE 100 MG: 100 TABLET, CHEWABLE ORAL at 15:52

## 2019-01-14 RX ADMIN — POTASSIUM CHLORIDE 20 MEQ: 1500 TABLET, EXTENDED RELEASE ORAL at 09:11

## 2019-01-14 RX ADMIN — LEVOTHYROXINE SODIUM 88 MCG: 88 TABLET ORAL at 05:15

## 2019-01-14 RX ADMIN — FERROUS SULFATE TAB 325 MG (65 MG ELEMENTAL FE) 325 MG: 325 (65 FE) TAB at 09:10

## 2019-01-14 RX ADMIN — Medication 250 MG: at 17:50

## 2019-01-14 NOTE — PLAN OF CARE
Problem: PHYSICAL THERAPY ADULT  Goal: Performs mobility at highest level of function for planned discharge setting  See evaluation for individualized goals  Treatment/Interventions: Functional transfer training, LE strengthening/ROM, Elevations, Therapeutic exercise, Endurance training, Patient/family training, Bed mobility, Gait training, Spoke to nursing          See flowsheet documentation for full assessment, interventions and recommendations  Outcome: Progressing  Prognosis: Good  Problem List: Decreased strength, Decreased endurance, Impaired balance, Decreased mobility, Decreased cognition, Decreased safety awareness  Assessment: Pt seen for PT treatment session this date with interventions consisting of gait training w/ emphasis on improving pt's ability to ambulate level surfaces x 150 ft with close S provided by therapist with RW, therapeutic activity consisting of training: sit<>stand transfers and vc and tactile cues for static standing posture faciliation and navigating 3x3 stairs w/ B handrail with alternating pattern with close S  Pt agreeable to PT treatment session upon arrival, pt found seated OOB in recliner, in no apparent distress and responsive  In comparison to previous session, pt with improvements in tolerating greater amb distances and able to safely navigate elevations w/ S provided by PT for safety  Post session: pt returned back to recliner, chair alarm engaged, all needs in reach and RN notified of session findings/recommendations  Continue to recommend Home PT at time of d/c in order to maximize pt's functional independence and safety w/ mobility  Pt continues to be functioning below baseline level, and remains limited 2* factors listed above  PT will continue to see pt while here in order to address the deficits listed above and provide interventions consistent w/ POC in effort to achieve STGs    Barriers to Discharge: Inaccessible home environment     Recommendation: Home PT (return to snf c PT)     PT - OK to Discharge: Yes (when medically cleared)    See flowsheet documentation for full assessment

## 2019-01-14 NOTE — PROGRESS NOTES
Progress Note Michelle Peralta [birth year], 68 y o  female MRN: [id number]    Unit/Bed#: -01 Encounter: [id number]    Primary Care Provider: Harper Miles DO   Date and time admitted to hospital: 1/12/2019 10:31 PM        Aortoiliac occlusive disease [facility])   Assessment & Plan       12/2018 S/p angiogram showing initial stent fracture, placement of B TEMO stents, R EIA distal stent extension for dissection  Continue with dual anti-platelet therapy, Coumadin  Atrial fibrillation (HCC)   Assessment & Plan    Rate control  Continue with Cardizem, Lopressor, Coumadin  INR 1 8       Seizure disorder [facility])   Assessment & Plan    Currently stable no active issues  Patient will continue with carbamazepine, Depakote  Maintain seizure precautions  Dementia   Assessment & Plan    At baseline no active issues  Maintain fall aspiration precautions  PTOT evaluation recommends home PT       * Urinary tract infection associated with catheterization of urinary tract [facility])   Assessment & Plan    Follow-up results of blood culture x2, urine culture with GNR ID and sens pending  Catheter associated UT  On [place] based on prior UC results         VTE Pharmacologic Prophylaxis:   Pharmacologic: Heparin  Mechanical VTE Prophylaxis in Place: Yes    Patient Centered Rounds: I have performed bedside rounds with nursing staff today  Discussions with Specialists or Other Care Team Provider:     Education and Discussions with Family / Patient:     Time Spent for Care: 30 minutes  More than 50% of total time spent on counseling and coordination of care as described above  Current Length of Stay: 1 day(s)    Current Patient Status: Inpatient   Certification Statement: The patient will continue to require additional inpatient hospital stay due to awaiting sens on UC    Discharge Plan: Earnestine Anna with PT    Code Status: Level 1 - Full Code      Subjective:   Feels much better today      Objective:     Vitals:   Temp (24hrs), Av 2 °F (36 8 °C), Min:97 9 °F (36 6 °C), Max:98 4 °F (36 9 °C)    Temp:  [97 9 °F (36 6 °C)-98 4 °F (36 9 °C)] 97 9 °F (36 6 °C)  HR:  [52-62] 52  Resp:  [18-20] 20  BP: (129-176)/(58-95) 147/67  SpO2:  [92 %-99 %] 95 %  Body mass index is 34 06 kg/m²  Input and Output Summary (last 24 hours): Intake/Output Summary (Last 24 hours) at 19 1119  Last data filed at 19 0802   Gross per 24 hour   Intake              615 ml   Output             1700 ml   Net            -1085 ml       Physical Exam:     Physical Exam   Constitutional: She appears well-developed and well-nourished  HENT:   Head: Normocephalic and atraumatic  Eyes: Pupils are equal, round, and reactive to light  Cardiovascular: Normal rate and regular rhythm  Pulmonary/Chest: Effort normal and breath sounds normal    Abdominal: Bowel sounds are normal    Musculoskeletal: She exhibits no edema  Neurological: She is alert  Additional Data:     Labs:      Results from last 7 days  Lab Units 19  0521   WBC Thousand/uL 4 66   HEMOGLOBIN g/dL 11 0*   HEMATOCRIT % 34 1*   PLATELETS Thousands/uL 145*   NEUTROS PCT % 48   LYMPHS PCT % 30   MONOS PCT % 19*   EOS PCT % 3       Results from last 7 days  Lab Units 19  0521 19  0605   SODIUM mmol/L 146* 146*   POTASSIUM mmol/L 3 9 3 6   CHLORIDE mmol/L 108 109*   CO2 mmol/L 27 28   BUN mg/dL 29* 31*   CREATININE mg/dL 1 35* 1 42*   ANION GAP mmol/L 11 9   CALCIUM mg/dL 9 4 9 1   ALBUMIN g/dL  --  3 0*   TOTAL BILIRUBIN mg/dL  --  0 40   ALK PHOS U/L  --  50   ALT U/L  --  24   AST U/L  --  19   GLUCOSE RANDOM mg/dL 101 96       Results from last 7 days  Lab Units 19  0521   INR  1 82*               Results from last 7 days  Lab Units 19  0605 19  2324   LACTIC ACID mmol/L  --  0 7   PROCALCITONIN ng/ml 0 17 0 23           * I Have Reviewed All Lab Data Listed Above  * Additional Pertinent Lab Tests Reviewed:  All Labs Within Last 24 Hours Reviewed    Imaging:    Imaging Reports Reviewed Today Include:   Imaging Personally Reviewed by Myself Includes:      Recent Cultures (last 7 days):       Results from last 7 days  Lab Units 01/13/19  0127 01/12/19 2325   URINE CULTURE  >100,000 cfu/ml Gram Negative Lukasz*  --    INFLUENZA B PCR   --  None Detected   RSV PCR   --  None Detected       Last 24 Hours Medication List:     Current Facility-Administered Medications:  acetaminophen 650 mg Oral Q6H PRN Cande Patrick MD    albuterol 2 5 mg Nebulization Q4H PRN Cande Patrick MD    allopurinol 300 mg Oral QAM Cande Castanon MD    atorvastatin 10 mg Oral Daily Cande Patrick MD    buPROPion 150 mg Oral Daily Cande Patrick MD    carBAMazepine 100 mg Oral TID Judy Persaud MD    cefTAZidime 500 mg Intravenous Q12H Komal Paez MD Last Rate: Stopped (01/13/19 2345)   cholecalciferol 2,000 Units Oral Daily Cande Patrick MD    clopidogrel 75 mg Oral Daily Judy Persaud MD    diltiazem 180 mg Oral Q12H [facility] Cande Patrick MD    divalproex sodium 500 mg Oral Daily Cande Castanon MD    docusate sodium 100 mg Oral BID Cande Patrick MD    fenofibrate 48 mg Oral Daily Belkis Meléndez MD    ferrous sulfate 325 mg Oral BID Judy Persaud MD    fluticasone-vilanterol 1 puff Inhalation Daily Cande Patrick MD    furosemide 40 mg Oral Daily Cande Castanon MD    hydrALAZINE 10 mg Intravenous Q6H PRN Komal Paez MD    levothyroxine 88 mcg Oral Early Morning Cande Patrick MD    metoprolol tartrate 50 mg Oral Q12H [facility] Cande Patrick MD    nitroglycerin 0 4 mg Sublingual Q5 Min PRN Caned Lucas MD    ondansetron 4 mg Intravenous Q6H PRN Cande Patrick MD    potassium chloride 20 mEq Oral Daily Cande Patrick MD    saccharomyces boulardii 250 mg Oral BID Cande Patrick MD    senna 1 tablet Oral Daily Judy Persaud MD    warfarin 5 mg Oral Daily (warfarin) Chan Bonilla MD         Today, Patient Was Seen By: Angela Singer MD    ** Please Note: Dictation voice to text software may have been used in the creation of this document   **

## 2019-01-14 NOTE — ASSESSMENT & PLAN NOTE
Follow-up results of blood culture x2, urine culture with GNR ID and sens pending  Catheter associated UT  On [place] based on prior UC results

## 2019-01-14 NOTE — ASSESSMENT & PLAN NOTE
At baseline no active issues  Maintain fall aspiration precautions    PTOT evaluation recommends home PT

## 2019-01-14 NOTE — PLAN OF CARE
Problem: Potential for Falls  Goal: Patient will remain free of falls  INTERVENTIONS:  - Assess patient frequently for physical needs  -  Identify cognitive and physical deficits and behaviors that affect risk of falls  -  [place] fall precautions as indicated by assessment   - Educate patient/family on patient safety including physical limitations  - Instruct patient to call for assistance with activity based on assessment  - Modify environment to reduce risk of injury  - Consider OT/PT consult to assist with strengthening/mobility   Outcome: Progressing      Problem: Prexisting or High Potential for Compromised Skin Integrity  Goal: Skin integrity is maintained or improved  INTERVENTIONS:  - Identify patients at risk for skin breakdown  - Assess and monitor skin integrity  - Assess and monitor nutrition and hydration status  - Monitor labs (i e  albumin)  - Assess for incontinence   - Turn and reposition patient  - Assist with mobility/ambulation  - Relieve pressure over bony prominences  - Avoid friction and shearing  - Provide appropriate hygiene as needed including keeping skin clean and dry  - Evaluate need for skin moisturizer/barrier cream  - Collaborate with interdisciplinary team (i e  Nutrition, Rehabilitation, etc )   - Patient/family teaching   Outcome: Progressing      Problem: NEUROSENSORY - ADULT  Goal: Achieves stable or improved neurological status  INTERVENTIONS  - Monitor and report changes in neurological status  - Initiate measures to prevent increased intracranial pressure  - Maintain blood pressure and fluid volume within ordered parameters to optimize cerebral perfusion  - Monitor temperature, glucose, and sodium or any other associated labs   Initiate appropriate interventions as ordered  - Monitor for seizure activity   - Administer anti-seizure medications as ordered  Outcome: Progressing    Goal: Absence of seizures  INTERVENTIONS  - Monitor for seizure activity  - Administer anti-seizure medications as ordered  - Monitor neurological status  Outcome: Progressing    Goal: Remains free of injury related to seizures activity  INTERVENTIONS  - Maintain airway, patient safety  and administer oxygen as ordered  - Monitor patient for seizure activity, document and report duration and description of seizure to physician/advanced practitioner  - If seizure occurs,  ensure patient safety during seizure  - Reorient patient post seizure  - Seizure pads on all 4 side rails  - Instruct patient/family to notify RN of any seizure activity including if an aura is experienced  - Instruct patient/family to call for assistance with activity based on nursing assessment  - Administer anti-seizure medications as ordered  - Monitor fetal well being  Outcome: Progressing    Goal: Achieves maximal functionality and self care  INTERVENTIONS  - Monitor swallowing and airway patency with patient fatigue and changes in neurological status  - Encourage and assist patient to increase activity and self care with guidance from rehab services  - Encourage visually impaired, hearing impaired and aphasic patients to use assistive/communication devices  Outcome: Progressing      Problem: GENITOURINARY - ADULT  Goal: Maintains or returns to baseline urinary function  INTERVENTIONS:  - Assess urinary function  - Encourage oral fluids to ensure adequate hydration  - Administer IV fluids as ordered to ensure adequate hydration  - Administer ordered medications as needed  - Offer frequent toileting  - Follow urinary retention protocol if ordered  Outcome: Progressing    Goal: Absence of urinary retention  INTERVENTIONS:  - Assess patients ability to void and empty bladder  - Monitor I/O  - Bladder scan as needed  - Discuss with physician/AP medications to alleviate retention as needed  - Discuss catheterization for long term situations as appropriate  Outcome: Progressing    Goal: Urinary catheter remains patent  INTERVENTIONS:  - Assess patency of urinary catheter  - If patient has a chronic baca, consider changing catheter if non-functioning  - Follow guidelines for intermittent irrigation of non-functioning urinary catheter  Outcome: Progressing

## 2019-01-14 NOTE — PHYSICAL THERAPY NOTE
Physical Therapy Treatment Note       01/14/19 1418   Pain Assessment   Pain Assessment No/denies pain   Pain Score No Pain   Restrictions/Precautions   Other Precautions Impulsive; Chair Alarm; Bed Alarm;Multiple lines;Telemetry; Fall Risk;Seizure   General   Chart Reviewed Yes   Response to Previous Treatment Patient with no complaints from previous session  Family/Caregiver Present No   Cognition   Arousal/Participation Alert; Responsive; Cooperative   Attention Attends with cues to redirect   Orientation Level Oriented to person   Memory Decreased recall of biographical information;Decreased recall of recent events;Decreased recall of precautions   Following Commands Follows one step commands without difficulty   Comments pt agreeable to PT session   Subjective   Subjective "I would like to get up and walk"   Bed Mobility   Rolling R Unable to assess  (pt received OOB in recliner)   Transfers   Sit to Stand 5  Supervision   Additional items Assist x 1; Armrests; Impulsive   Stand to Sit 5  Supervision   Additional items Assist x 1; Armrests; Verbal cues   Ambulation/Elevation   Gait pattern Decreased foot clearance; Inconsistent randi; Shuffling   Gait Assistance 5  Supervision   Additional items Assist x 1;Verbal cues   Assistive Device Rolling walker   Distance 150 ft   Stair Management Assistance 5  Supervision   Additional items Assist x 1;Verbal cues   Stair Management Technique Two rails; Alternating pattern; Foreward   Number of Stairs 3  (x3 trials)   Balance   Static Sitting Normal   Dynamic Sitting Good   Static Standing Fair +   Dynamic Standing Fair   Ambulatory Fair   Activity Tolerance   Activity Tolerance Patient tolerated treatment well;Patient limited by fatigue   Nurse Made Aware Yes, RN Marnie Aguillon verbalized pt appropriate for PT session, made aware of outcomes/recs   Assessment   Prognosis Good   Problem List Decreased strength;Decreased endurance; Impaired balance;Decreased mobility; Decreased cognition;Decreased safety awareness   Assessment Pt seen for PT treatment session this date with interventions consisting of gait training w/ emphasis on improving pt's ability to ambulate level surfaces x 150 ft with close S provided by therapist with RW, therapeutic activity consisting of training: sit<>stand transfers and vc and tactile cues for static standing posture faciliation and navigating 3x3 stairs w/ B handrail with alternating pattern with close S  Pt agreeable to PT treatment session upon arrival, pt found seated OOB in recliner, in no apparent distress and responsive  In comparison to previous session, pt with improvements in tolerating greater amb distances and able to safely navigate elevations w/ S provided by PT for safety  Post session: pt returned back to recliner, chair alarm engaged, all needs in reach and RN notified of session findings/recommendations  Continue to recommend Home PT at time of d/c in order to maximize pt's functional independence and safety w/ mobility  Pt continues to be functioning below baseline level, and remains limited 2* factors listed above  PT will continue to see pt while here in order to address the deficits listed above and provide interventions consistent w/ POC in effort to achieve STGs  Barriers to Discharge Inaccessible home environment   Goals   Patient Goals "to go back to [place]"   STG Expiration Date 01/23/19   Short Term Goal #1 STGs remain appropriate   Treatment Day 1   Plan   Treatment/Interventions Functional transfer training;LE strengthening/ROM; Elevations; Therapeutic exercise; Endurance training;Patient/family training;Bed mobility;Gait training;Spoke to nursing   Progress Progressing toward goals   PT Frequency (3-5x/wk)   Recommendation   Recommendation Home PT  (return to [facility] PT)   Equipment Recommended Walker  (pt encouraged to continue to use RW)   PT - OK to Discharge Yes  (when medically cleared)       Paul Godinez PT    Time of PT treatment session: [id number]

## 2019-01-15 PROBLEM — N17.9 AKI (ACUTE KIDNEY INJURY) (HCC): Status: RESOLVED | Noted: 2017-12-10 | Resolved: 2019-01-15

## 2019-01-15 LAB
INR PPP: 2.41 (ref 0.86–1.17)
PROTHROMBIN TIME: 25.9 SECONDS (ref 11.8–14.2)

## 2019-01-15 PROCEDURE — 99223 1ST HOSP IP/OBS HIGH 75: CPT | Performed by: INTERNAL MEDICINE

## 2019-01-15 PROCEDURE — 85610 PROTHROMBIN TIME: CPT | Performed by: INTERNAL MEDICINE

## 2019-01-15 PROCEDURE — 99232 SBSQ HOSP IP/OBS MODERATE 35: CPT | Performed by: PHYSICIAN ASSISTANT

## 2019-01-15 RX ORDER — WARFARIN SODIUM 3 MG/1
3 TABLET ORAL
Status: DISCONTINUED | OUTPATIENT
Start: 2019-01-15 | End: 2019-01-16 | Stop reason: HOSPADM

## 2019-01-15 RX ORDER — LEVOFLOXACIN 500 MG/1
500 TABLET, FILM COATED ORAL ONCE
Status: COMPLETED | OUTPATIENT
Start: 2019-01-15 | End: 2019-01-15

## 2019-01-15 RX ORDER — LEVOFLOXACIN 250 MG/1
250 TABLET ORAL EVERY 24 HOURS
Status: DISCONTINUED | OUTPATIENT
Start: 2019-01-16 | End: 2019-01-16 | Stop reason: HOSPADM

## 2019-01-15 RX ADMIN — ALLOPURINOL 300 MG: 300 TABLET ORAL at 08:55

## 2019-01-15 RX ADMIN — SENNOSIDES 8.6 MG: 8.6 TABLET, FILM COATED ORAL at 08:55

## 2019-01-15 RX ADMIN — FUROSEMIDE 40 MG: 40 TABLET ORAL at 08:54

## 2019-01-15 RX ADMIN — CLOPIDOGREL BISULFATE 75 MG: 75 TABLET ORAL at 08:54

## 2019-01-15 RX ADMIN — DILTIAZEM HYDROCHLORIDE 180 MG: 90 CAPSULE, EXTENDED RELEASE ORAL at 20:41

## 2019-01-15 RX ADMIN — FERROUS SULFATE TAB 325 MG (65 MG ELEMENTAL FE) 325 MG: 325 (65 FE) TAB at 08:54

## 2019-01-15 RX ADMIN — FLUTICASONE FUROATE AND VILANTEROL TRIFENATATE 1 PUFF: 100; 25 POWDER RESPIRATORY (INHALATION) at 08:56

## 2019-01-15 RX ADMIN — DOCUSATE SODIUM 100 MG: 100 CAPSULE, LIQUID FILLED ORAL at 08:55

## 2019-01-15 RX ADMIN — LEVOFLOXACIN 500 MG: 500 TABLET, FILM COATED ORAL at 17:39

## 2019-01-15 RX ADMIN — ATORVASTATIN CALCIUM 10 MG: 10 TABLET, FILM COATED ORAL at 16:11

## 2019-01-15 RX ADMIN — DIVALPROEX SODIUM 500 MG: 250 TABLET, FILM COATED, EXTENDED RELEASE ORAL at 08:55

## 2019-01-15 RX ADMIN — CARBAMAZEPINE 100 MG: 100 TABLET, CHEWABLE ORAL at 08:55

## 2019-01-15 RX ADMIN — Medication 250 MG: at 08:54

## 2019-01-15 RX ADMIN — CARBAMAZEPINE 100 MG: 100 TABLET, CHEWABLE ORAL at 16:11

## 2019-01-15 RX ADMIN — Medication 250 MG: at 17:39

## 2019-01-15 RX ADMIN — CARBAMAZEPINE 100 MG: 100 TABLET, CHEWABLE ORAL at 20:41

## 2019-01-15 RX ADMIN — CEFTAZIDIME 500 MG: 1 INJECTION, POWDER, FOR SOLUTION INTRAMUSCULAR; INTRAVENOUS at 13:15

## 2019-01-15 RX ADMIN — FENOFIBRATE 48 MG: 48 TABLET, COATED ORAL at 08:55

## 2019-01-15 RX ADMIN — DOCUSATE SODIUM 100 MG: 100 CAPSULE, LIQUID FILLED ORAL at 17:39

## 2019-01-15 RX ADMIN — POTASSIUM CHLORIDE 20 MEQ: 1500 TABLET, EXTENDED RELEASE ORAL at 08:54

## 2019-01-15 RX ADMIN — LEVOTHYROXINE SODIUM 88 MCG: 88 TABLET ORAL at 05:05

## 2019-01-15 RX ADMIN — VITAMIN D, TAB 1000IU (100/BT) 2000 UNITS: 25 TAB at 08:55

## 2019-01-15 RX ADMIN — FERROUS SULFATE TAB 325 MG (65 MG ELEMENTAL FE) 325 MG: 325 (65 FE) TAB at 17:39

## 2019-01-15 RX ADMIN — BUPROPION HYDROCHLORIDE 150 MG: 150 TABLET, FILM COATED, EXTENDED RELEASE ORAL at 08:55

## 2019-01-15 RX ADMIN — WARFARIN SODIUM 3 MG: 3 TABLET ORAL at 17:39

## 2019-01-15 RX ADMIN — METOPROLOL TARTRATE 50 MG: 50 TABLET, FILM COATED ORAL at 08:54

## 2019-01-15 RX ADMIN — METOPROLOL TARTRATE 50 MG: 50 TABLET, FILM COATED ORAL at 20:41

## 2019-01-15 NOTE — PLAN OF CARE
GENITOURINARY - ADULT     Maintains or returns to baseline urinary function Progressing     Absence of urinary retention Progressing     Urinary catheter remains patent Progressing        NEUROSENSORY - ADULT     Achieves stable or improved neurological status Progressing     Absence of seizures Progressing     Remains free of injury related to seizures activity Progressing     Achieves maximal functionality and self care Progressing        Potential for Falls     Patient will remain free of falls Progressing        Prexisting or High Potential for Compromised Skin Integrity     Skin integrity is maintained or improved Progressing

## 2019-01-15 NOTE — PROGRESS NOTES
Progress Note Taylor Winters [birth year], 68 y o  female MRN: [id number]    Unit/Bed#: -01 Encounter: [id number]    Primary Care Provider: Elver Houser DO   Date and time admitted to hospital: 1/12/2019 10:31 PM       DOS: 1/15/2019      * Urinary tract infection associated with catheterization of urinary tract [facility])   Assessment & Plan    · Pt with chronic indwelling baca catheter due to neurogenic bladder  · Recent exchange on Friday at [facility] [address]   · Urine culture (+) for enterococcus and providencia   · Currently being treated with IV ceftazidime   · ID consultation pending for transition to PO abx   · Blood cultures negative x 24 hours  · Pt symptoms have significant improved throughout hospitalization  · Procalcitonin negative, trending down     Aortoiliac occlusive disease ([facility] )   Assessment & Plan    · 12/2018 - pt s/p angiogram with stent fracture, placed new stent  · Continue plavix and coumadin therapy   · Stable, follow up with vascular surgery as an outpatient     Bradycardia   Assessment & Plan    · This appears to be patient's baseline on review of outpatient records  · Did have a few episodes with HR in the 40s, currently HR 60s on telemetry monitor  · Would hold off on any medication adjustments for now, continue to monitor on telemetry  · Pt currently asymptomatic   · TSH WNL     Atrial fibrillation (HCC)   Assessment & Plan    · Rate controlled with lopressor 50 mg BID and cardizem 120 mg daily   · INR therapeutic now at 2 4  · Continue Coumadin at 3 mg daily  · Check INR in the AM  · Goal INR 2-3       Seizure disorder ([facility] )   Assessment & Plan    · Currently stable, no active issues  · Continue with carbamazepine 100 mg TID, Depakote 500 mg daily  · Seizure precautions    · Valproate acid level 32  · Tegretol level 9     Dementia   Assessment & Plan    · Mentation appears to be at baseline   · Continue fall and aspiration precautions  · PT/OT recommending home PT, pt to go back to [place] upon discharge     TAM (acute kidney injury) (HCC)-resolved as of 1/15/2019   Assessment & Plan    · POA  · Significantly improved  · Cr  1 35 today, back to patient's baseline   · Resolved       VTE Pharmacologic Prophylaxis:   Pharmacologic: Warfarin (Coumadin)  Mechanical VTE Prophylaxis in Place: Yes    Patient Centered Rounds: I have evaluated patient without nursing staff present due to Speaking to nurse outside patient's room    Discussions with Specialists or Other Care Team Provider:  Discussed with RN, cm and reviewed previous notes    Education and Discussions with Family / Patient:  Discussed with patient and patient's daughter extensively at bedside  Time Spent for Care: 30 minutes  More than 50% of total time spent on counseling and coordination of care as described above  Current Length of Stay: 2 day(s)    Current Patient Status: Inpatient   Certification Statement: The patient will continue to require additional inpatient hospital stay due to Awaiting acceptance back to [facility], id consultation    Discharge Plan:  Pending ID clearance, pt to d/c to [place] on discharge, likely tomorrow per CM  Code Status: Level 1 - Full Code      Subjective:   Pt reports that she feels much better today  Currently denies any lightheadedness, dizziness, chest pain, shortness of breath, abdominal pain, nausea, vomiting, diarrhea, constipation or urinary difficulties  Reports she has had an indwelling Davis catheter for many years now and they had recently changed it at [place] due to an obstruction  Objective:     Vitals:   Temp (24hrs), Av 9 °F (36 6 °C), Min:97 7 °F (36 5 °C), Max:98 1 °F (36 7 °C)    Temp:  [97 7 °F (36 5 °C)-98 1 °F (36 7 °C)] 98 °F (36 7 °C)  HR:  [48-64] 48  Resp:  [17-20] 20  BP: (103-176)/(52-78) 176/78  SpO2:  [92 %-97 %] 97 %  Body mass index is 34 22 kg/m²  Input and Output Summary (last 24 hours):        Intake/Output Summary (Last 24 hours) at 01/15/19 1542  Last data filed at 01/15/19 1447   Gross per 24 hour   Intake              400 ml   Output             1400 ml   Net            -1000 ml       Physical Exam:     Physical Exam   Constitutional: No distress  Patient is in no acute distress sitting in her hospital chair resting comfortably accompanied by her daughter  Pleasantly demented  HENT:   Head: Normocephalic and atraumatic  Eyes: Conjunctivae are normal  No scleral icterus  Cardiovascular: Regular rhythm and intact distal pulses  Bradycardic   Pulmonary/Chest: Effort normal and breath sounds normal  No respiratory distress  She has no wheezes  She has no rales  Abdominal: Soft  Bowel sounds are normal  She exhibits no distension  There is no tenderness  There is no rebound  Musculoskeletal: She exhibits no edema  Neurological: She is alert  Skin: Skin is warm and dry  She is not diaphoretic  No erythema  Vitals reviewed  Additional Data:     Labs:      Results from last 7 days  Lab Units 01/14/19  0521   WBC Thousand/uL 4 66   HEMOGLOBIN g/dL 11 0*   HEMATOCRIT % 34 1*   PLATELETS Thousands/uL 145*   NEUTROS PCT % 48   LYMPHS PCT % 30   MONOS PCT % 19*   EOS PCT % 3       Results from last 7 days  Lab Units 01/14/19  0521 01/13/19  0605   POTASSIUM mmol/L 3 9 3 6   CHLORIDE mmol/L 108 109*   CO2 mmol/L 27 28   BUN mg/dL 29* 31*   CREATININE mg/dL 1 35* 1 42*   CALCIUM mg/dL 9 4 9 1   ALK PHOS U/L  --  50   ALT U/L  --  24   AST U/L  --  19       Results from last 7 days  Lab Units 01/15/19  0451   INR  2 41*       * I Have Reviewed All Lab Data Listed Above  * Additional Pertinent Lab Tests Reviewed:  All Labs Within Last 24 Hours Reviewed    Imaging:    Imaging Reports Reviewed Today Include: CXR  Imaging Personally Reviewed by Myself Includes:  None    Recent Cultures (last 7 days):       Results from last 7 days  Lab Units 01/13/19  0127 01/12/19  2325 01/12/19  2324   BLOOD CULTURE   --   --  No Growth at 24 hrs   No Growth at 24 hrs     URINE CULTURE  >100,000 cfu/ml Providencia rettgeri*  >100,000 cfu/ml Enterococcus faecalis*  --   --    INFLUENZA B PCR   --  None Detected  --    RSV PCR   --  None Detected  --        Last 24 Hours Medication List:     Current Facility-Administered Medications:  acetaminophen 650 mg Oral Q6H PRN Cande Lewis MD    albuterol 2 5 mg Nebulization Q4H PRN Cande Lewis MD    allopurinol 300 mg Oral QAM Cande Silva MD    atorvastatin 10 mg Oral Daily Cande eLwis MD    buPROPion 150 mg Oral Daily Cande Lewis MD    carBAMazepine 100 mg Oral TID Nic Wharton MD    cefTAZidime 500 mg Intravenous Q12H Sarahi Kemarstephanie Meléndez MD Last Rate: 500 mg (01/15/19 1315)   cholecalciferol 2,000 Units Oral Daily Cande Lewis MD    clopidogrel 75 mg Oral Daily Nic Wharton MD    diltiazem 180 mg Oral Q12H [facility] Cande Lewis MD    divalproex sodium 500 mg Oral Daily Cande Silva MD    docusate sodium 100 mg Oral BID Cande Lewis MD    fenofibrate 48 mg Oral Daily Belkis Meléndez MD    ferrous sulfate 325 mg Oral BID Nic Wharton MD    fluticasone-vilanterol 1 puff Inhalation Daily Cande Lewis MD    furosemide 40 mg Oral Daily Cande Silva MD    hydrALAZINE 10 mg Intravenous Q6H PRN Ngoc Santana MD    levothyroxine 88 mcg Oral Early Morning Cande Lewis MD    metoprolol tartrate 50 mg Oral Q12H [facility] Cande Lewis MD    nitroglycerin 0 4 mg Sublingual Q5 Min PRN Cande Lucas MD    ondansetron 4 mg Intravenous Q6H PRN Cande Lewis MD    potassium chloride 20 mEq Oral Daily Cande Lewis MD    saccharomyces boulardii 250 mg Oral BID Cande Lewis MD    senna 1 tablet Oral Daily Cande Lewis MD    warfarin 5 mg Oral Daily (warfarin) Nic Wharton MD         Today, Patient Was Seen By: Candice Choi PA-C    ** Please Note: Dictation voice to text software may have been used in the creation of this document   **

## 2019-01-15 NOTE — ASSESSMENT & PLAN NOTE
· Currently stable, no active issues  · Continue with carbamazepine 100 mg TID, Depakote 500 mg daily  · Seizure precautions    · Valproate acid level 32  · Tegretol level 9

## 2019-01-15 NOTE — ASSESSMENT & PLAN NOTE
· Mentation appears to be at baseline   · Continue fall and aspiration precautions  · PT/OT recommending home PT, pt to go back to [place] upon discharge

## 2019-01-15 NOTE — ASSESSMENT & PLAN NOTE
· This appears to be patient's baseline on review of outpatient records  · Did have a few episodes with HR in the 40s, currently HR 60s on telemetry monitor  · Would hold off on any medication adjustments for now, continue to monitor on telemetry  · Pt currently asymptomatic   · TSH WNL

## 2019-01-15 NOTE — PLAN OF CARE
Problem: Potential for Falls  Goal: Patient will remain free of falls  INTERVENTIONS:  - Assess patient frequently for physical needs  -  Identify cognitive and physical deficits and behaviors that affect risk of falls    -  [place] fall precautions as indicated by assessment   - Educate patient/family on patient safety including physical limitations  - Instruct patient to call for assistance with activity based on assessment  - Modify environment to reduce risk of injury  - Consider OT/PT consult to assist with strengthening/mobility   Outcome: Progressing

## 2019-01-15 NOTE — SOCIAL WORK
CM spoke with Julia Frazier at [facility] who reports they would be able to accept pt back tomorrow with PT through facility  Facility contacts updated  VMM left for pt's daughter to discuss DCP  Awaiting call back  CM will continue to follow

## 2019-01-15 NOTE — ASSESSMENT & PLAN NOTE
· 12/2018 - pt s/p angiogram with stent fracture, placed new stent  · Continue plavix and coumadin therapy   · Stable, follow up with vascular surgery as an outpatient

## 2019-01-15 NOTE — CONSULTS
Consultation - Infectious Disease   Sue Mcqueen 68 y o  female MRN: [id number]  Unit/Bed#: -01 Encounter: [id number]      IMPRESSION & RECOMMENDATIONS:   1  Possible complicated urinary tract infection:  Patient presented with atypical symptoms after recent change her Davis catheter  UA was of course diffusely positive in the setting of chronic catheter and culture results were noted  The patient reports some subjective improvement but she is an unreliable historian  She currently remains afebrile and hemodynamically stable  Cultures noted with both Providencia and Enterococcus; suspect latter organism is a colonizer given "improvement" without coverage   -will switch the patient to renally dosed Levaquin  -would plan to treat for a total of 7 days through 1/18  -would monitor patient's mental status closely on Levaquin  -continue to trend fever curve/vitals while admitted  -additional supportive care as per primary    2  Acute kidney injury on chronic kidney disease:  Patient noted with elevation in her creatinine from baseline  This has been improved over the course of the patient's admission and may have been elevated in the setting of infection   -Levaquin has been renally dose adjusted  -would continue to monitor mental status on this medication  -continue to trend chemistry  -additional supportive care as per primary    3  Neurogenic bladder with chronic Davis catheter:  Patient remains with chronic Davis catheter due to history of neurogenic bladder  Would recommend close follow-up with Urology as an outpatient for frequent catheter changes  4  Penicillin allergy:  Patient has a history of penicillin allergy which she reports occurred when she was a child  She cannot tell me if she was ever rechallenge with the drug or if she tolerated other antibiotics or other adverse reactions to other drugs given her underlying dementia    -will monitor for tolerance to Levaquin  -monitor mental status closely on Levaquin  -additional supportive care as per primary    5  Baseline dementia:  Patient is pleasant and conversant on exam   She seems to be at her reported baseline  No signs of CNS infection at this time  Will continue to monitor mental status as the patient is being started on Levaquin today  Above plan discussed in detail with the patient  Primary service updated of the above  ID consult service will continue to follow  HISTORY OF PRESENT ILLNESS:  Reason for Consult:  Urinary tract infection    Majority of history is gathered on chart review as the patient has baseline dementia  HPI: Verna Barnes is a 68y o  year old female with past medical history significant for chronic kidney disease, coronary artery disease, dementia, COPD and hyperlipidemia  She presented to the emergency department on 01/13 with complaints of low-grade fever and generalized weakness  Patient has a chronic Davis catheter in place for neurogenic bladder which was changed the Friday before admission  Patient's vitals were stable on admission  Creatinine was elevated 1 6  She had no leukocytosis  UA was diffusely positive  Patient was admitted for urinary tract infection was started on ceftriaxone  Patient was later switched to [facility] based on prior culture result  Creatinine noted to trend down on subsequent evaluation  Blood cultures have been without growth  Patient reported that her symptoms have improved over the course of her hospitalization  We are consulted at this time based on culture data for oral antibiotic options  Patient is currently afebrile and without leukocytosis  Chest x-ray was negative  Patient's other vitals are stable  On evaluation, the patient is a pleasantly demented elderly female  She can't recall why she was brought into the hospital or her penicillin allergy but that was as a child    She can't recall if her symptoms of truly gotten better but currently she denies any nausea, vomiting, chest pain or shortness of breath or abdominal pain, diarrhea or rash  She is hoping to go home soon  REVIEW OF SYSTEMS:  Review of systems limited to the above given the patient's baseline cognitive dysfunction  PAST MEDICAL HISTORY:  Past Medical History:   Diagnosis Date    Atrial fibrillation ([facility] )     Brain aneurysm     CAD (coronary artery disease)     Cardiac disease     had stents 12 years ago in [facility]    [facility]estive [facility]     CKD (chronic kidney disease)     COPD (chronic obstructive pulmonary disease) (HCC)     Dementia     Dementia     Disease of thyroid gland     GERD (gastroesophageal reflux disease)     Hyperlipidemia     Hyperlipidemia     Hypertension     MI (myocardial infarction) ([place] [address] )     Migraine     Renal disorder     Seizures ([place] [address] )     Stroke [facility])      Past Surgical History:   Procedure Laterality Date    APPENDECTOMY      ARTERIOGRAM N/A 12/7/2018    Procedure: ARTERIOGRAM WITH STENT PLACEMENT;  Surgeon: Spencer Reddy MD;  [facility]: [facility];  Service: Vascular    BLADDER TUMOR EXCISION      BRAIN SURGERY      1998  clip right frontal lobe    CARDIAC SURGERY      COLONOSCOPY      CORONARY STENT PLACEMENT      5 stents    EYE SURGERY      IR ABDOMINAL ANGIOGRAPHY / INTERVENTION  12/7/2018    MANDIBLE FRACTURE SURGERY      TONSILLECTOMY      TUBAL LIGATION      UTERINE FIBROID SURGERY         FAMILY HISTORY:  Non-contributory    SOCIAL HISTORY:  Social History   History   Alcohol Use No     History   Drug Use No     History   Smoking Status    Former Smoker    Packs/day: 5 00    Years: 40 00    Quit date: 9/7/2007   Smokeless Tobacco    Never Used       ALLERGIES:  Allergies   Allergen Reactions    Spiriva Handihaler [Tiotropium Bromide Monohydrate] Swelling    Ramipril Facial Swelling    Penicillins      unsure       MEDICATIONS:  All current active medications have been reviewed      PHYSICAL EXAM:  Temp:  [97 7 °F (36 5 °C)-98 1 °F (36 7 °C)] 98 °F (36 7 °C)  HR:  [48-64] 48  Resp:  [17-20] 20  BP: (103-176)/(52-78) 176/78  SpO2:  [92 %-97 %] 97 %  Temp (24hrs), Av 9 °F (36 6 °C), Min:97 7 °F (36 5 °C), Max:98 1 °F (36 7 °C)  Current: Temperature: 98 °F (36 7 °C)    Intake/Output Summary (Last 24 hours) at 01/15/19 1628  Last data filed at 01/15/19 1447   Gross per 24 hour   Intake              400 ml   Output             1400 ml   Net            -1000 ml       General Appearance:  Appearing well, nontoxic, and in no distress; elderly appearing female   Head:  Normocephalic, without obvious abnormality, atraumatic   Eyes:  Conjunctiva pink and sclera anicteric, both eyes   Nose: Nares normal, mucosa normal, no drainage   Throat: Oropharynx moist without lesions   Neck: Supple, symmetrical, no adenopathy, no tenderness/mass/nodules   Back:   Symmetric, no curvature, ROM normal, no CVA tenderness   Lungs:   Clear to auscultation bilaterally, respirations unlabored   Chest Wall:  No tenderness or deformity   Heart:  RRR; no rub or gallop; systolic murmur present   Abdomen:   Soft, non-tender, non-distended, positive bowel sounds; no suprapubic tenderness to palpation  Clear yellow urine in Davis  Extremities: No cyanosis, clubbing or edema   Skin: No rashes or lesions  No draining wounds noted  Lymph nodes: Cervical, supraclavicular nodes normal   Neurologic: Alert and oriented times 2, patient is able to move all of her extremities against gravity  She is unable to tell me any recent history or any of her past medical history  LABS, IMAGING, & OTHER STUDIES:  Lab Results:  I have personally reviewed pertinent labs      Results from last 7 days  Lab Units 19  0619  2324   WBC Thousand/uL 4 66 4 76 6 24   HEMOGLOBIN g/dL 11 0* 10 5* 10 7*   PLATELETS Thousands/uL 145* 130* 157       Results from last 7 days  Lab Units 19  0619  232 POTASSIUM mmol/L 3 9 3 6 3 7   CHLORIDE mmol/L 108 109* 106   CO2 mmol/L 27 28 30   BUN mg/dL 29* 31* 31*   CREATININE mg/dL 1 35* 1 42* 1 68*   EGFR ml/min/1 73sq m 39 37 30   CALCIUM mg/dL 9 4 9 1 9 4   AST U/L  --  19 22   ALT U/L  --  24 25   ALK PHOS U/L  --  50 51       Results from last 7 days  Lab Units 01/13/19  0127 01/12/19  2325 01/12/19  2324   BLOOD CULTURE   --   --  No Growth at 48 hrs  No Growth at 48 hrs  URINE CULTURE  >100,000 cfu/ml Providencia rettgeri*  >100,000 cfu/ml Enterococcus faecalis*  --   --    INFLUENZA B PCR   --  None Detected  --    RSV PCR   --  None Detected  --        Imaging Studies:   I have personally reviewed pertinent imaging study reports and images in PACS  Other Studies:   I have personally reviewed pertinent reports

## 2019-01-15 NOTE — PLAN OF CARE
Problem: Potential for Falls  Goal: Patient will remain free of falls  INTERVENTIONS:  - Assess patient frequently for physical needs  -  Identify cognitive and physical deficits and behaviors that affect risk of falls  -  [place] fall precautions as indicated by assessment   - Educate patient/family on patient safety including physical limitations  - Instruct patient to call for assistance with activity based on assessment  - Modify environment to reduce risk of injury  - Consider OT/PT consult to assist with strengthening/mobility   Outcome: Progressing      Problem: Prexisting or High Potential for Compromised Skin Integrity  Goal: Skin integrity is maintained or improved  INTERVENTIONS:  - Identify patients at risk for skin breakdown  - Assess and monitor skin integrity  - Assess and monitor nutrition and hydration status  - Monitor labs (i e  albumin)  - Assess for incontinence   - Turn and reposition patient  - Assist with mobility/ambulation  - Relieve pressure over bony prominences  - Avoid friction and shearing  - Provide appropriate hygiene as needed including keeping skin clean and dry  - Evaluate need for skin moisturizer/barrier cream  - Collaborate with interdisciplinary team (i e  Nutrition, Rehabilitation, etc )   - Patient/family teaching   Outcome: Progressing      Problem: NEUROSENSORY - ADULT  Goal: Achieves stable or improved neurological status  INTERVENTIONS  - Monitor and report changes in neurological status  - Initiate measures to prevent increased intracranial pressure  - Maintain blood pressure and fluid volume within ordered parameters to optimize cerebral perfusion  - Monitor temperature, glucose, and sodium or any other associated labs   Initiate appropriate interventions as ordered  - Monitor for seizure activity   - Administer anti-seizure medications as ordered   Outcome: Adequate for Discharge

## 2019-01-15 NOTE — ASSESSMENT & PLAN NOTE
· Rate controlled with lopressor 50 mg BID and cardizem 120 mg daily   · INR therapeutic now at 2 4  · Continue Coumadin at 3 mg daily  · Check INR in the AM  · Goal INR 2-3

## 2019-01-15 NOTE — PLAN OF CARE
Problem: DISCHARGE PLANNING - CARE MANAGEMENT  Goal: Discharge to post-acute care or home with appropriate resources  INTERVENTIONS:  - Conduct assessment to determine patient/family and health care team treatment goals, and need for post-acute services based on payer coverage, community resources, and patient preferences, and barriers to discharge  - Address psychosocial, clinical, and financial barriers to discharge as identified in assessment in conjunction with the patient/family and health care team  - Arrange appropriate level of post-acute services according to patients   needs and preference and payer coverage in collaboration with the physician and health care team  - Communicate with and update the patient/family, physician, and health care team regarding progress on the discharge plan  - Arrange appropriate transportation to post-acute venues  Outcome: Progressing  CM spoke with Joe Gerardo at [facility] who reports they would be able to accept pt back tomorrow with PT through facility  Facility contacts updated  VMM left for pt's daughter to discuss DCP  Awaiting call back  CM will continue to follow

## 2019-01-16 VITALS
OXYGEN SATURATION: 97 % | SYSTOLIC BLOOD PRESSURE: 141 MMHG | HEART RATE: 63 BPM | TEMPERATURE: 98 F | BODY MASS INDEX: 34.19 KG/M2 | DIASTOLIC BLOOD PRESSURE: 94 MMHG | HEIGHT: 60 IN | RESPIRATION RATE: 18 BRPM | WEIGHT: 174.16 LBS

## 2019-01-16 LAB
INR PPP: 2.47 (ref 0.86–1.17)
PROTHROMBIN TIME: 26.4 SECONDS (ref 11.8–14.2)

## 2019-01-16 PROCEDURE — 85610 PROTHROMBIN TIME: CPT | Performed by: INTERNAL MEDICINE

## 2019-01-16 PROCEDURE — 97110 THERAPEUTIC EXERCISES: CPT

## 2019-01-16 PROCEDURE — 99239 HOSP IP/OBS DSCHRG MGMT >30: CPT | Performed by: PHYSICIAN ASSISTANT

## 2019-01-16 PROCEDURE — 99232 SBSQ HOSP IP/OBS MODERATE 35: CPT | Performed by: INTERNAL MEDICINE

## 2019-01-16 PROCEDURE — 97116 GAIT TRAINING THERAPY: CPT

## 2019-01-16 RX ORDER — DIVALPROEX SODIUM 250 MG/1
250 TABLET, EXTENDED RELEASE ORAL 2 TIMES DAILY
Start: 2019-01-16 | End: 2020-04-22

## 2019-01-16 RX ORDER — LEVOFLOXACIN 250 MG/1
250 TABLET ORAL EVERY 24 HOURS
Qty: 2 TABLET | Refills: 0 | Status: SHIPPED | OUTPATIENT
Start: 2019-01-16 | End: 2019-01-18

## 2019-01-16 RX ORDER — SACCHAROMYCES BOULARDII 250 MG
250 CAPSULE ORAL 2 TIMES DAILY
Refills: 0
Start: 2019-01-16 | End: 2020-01-05 | Stop reason: ALTCHOICE

## 2019-01-16 RX ADMIN — FENOFIBRATE 48 MG: 48 TABLET, COATED ORAL at 09:24

## 2019-01-16 RX ADMIN — SENNOSIDES 8.6 MG: 8.6 TABLET, FILM COATED ORAL at 09:24

## 2019-01-16 RX ADMIN — VITAMIN D, TAB 1000IU (100/BT) 2000 UNITS: 25 TAB at 09:24

## 2019-01-16 RX ADMIN — CLOPIDOGREL BISULFATE 75 MG: 75 TABLET ORAL at 09:24

## 2019-01-16 RX ADMIN — DIVALPROEX SODIUM 500 MG: 250 TABLET, FILM COATED, EXTENDED RELEASE ORAL at 09:24

## 2019-01-16 RX ADMIN — DOCUSATE SODIUM 100 MG: 100 CAPSULE, LIQUID FILLED ORAL at 09:24

## 2019-01-16 RX ADMIN — METOPROLOL TARTRATE 50 MG: 50 TABLET, FILM COATED ORAL at 09:25

## 2019-01-16 RX ADMIN — POTASSIUM CHLORIDE 20 MEQ: 1500 TABLET, EXTENDED RELEASE ORAL at 09:24

## 2019-01-16 RX ADMIN — FLUTICASONE FUROATE AND VILANTEROL TRIFENATATE 1 PUFF: 100; 25 POWDER RESPIRATORY (INHALATION) at 09:27

## 2019-01-16 RX ADMIN — ALLOPURINOL 300 MG: 300 TABLET ORAL at 09:25

## 2019-01-16 RX ADMIN — CARBAMAZEPINE 100 MG: 100 TABLET, CHEWABLE ORAL at 09:24

## 2019-01-16 RX ADMIN — FUROSEMIDE 40 MG: 40 TABLET ORAL at 09:24

## 2019-01-16 RX ADMIN — BUPROPION HYDROCHLORIDE 150 MG: 150 TABLET, FILM COATED, EXTENDED RELEASE ORAL at 09:25

## 2019-01-16 RX ADMIN — LEVOTHYROXINE SODIUM 88 MCG: 88 TABLET ORAL at 06:33

## 2019-01-16 RX ADMIN — FERROUS SULFATE TAB 325 MG (65 MG ELEMENTAL FE) 325 MG: 325 (65 FE) TAB at 09:25

## 2019-01-16 RX ADMIN — DILTIAZEM HYDROCHLORIDE 180 MG: 90 CAPSULE, EXTENDED RELEASE ORAL at 09:28

## 2019-01-16 RX ADMIN — Medication 250 MG: at 09:25

## 2019-01-16 NOTE — DISCHARGE SUMMARY
Discharge- Evelin Fajardo [birth year], 68 y o  female MRN: [id number]    Unit/Bed#: -01 Encounter: [id number]    Primary Care Provider: Elver Houser DO   Date and time admitted to hospital: 1/12/2019 10:31 PM        * Urinary tract infection associated with catheterization of urinary tract [facility])   Assessment & Plan    · Patient with chronic indwelling baca catheter due to neurogenic bladder  · Recent exchange on Friday at [facility]  · Urine culture (+) for enterococcus and providencia   · Currently being treated with IV Ceftazidime which was transitioned to Levaquin  · Blood cultures negative x 48 hours  · Patient's symptoms have significant improved throughout hospitalization  · Procalcitonin negative, trending down  · Appreciate ID input: continue the patient on oral Levaquin 250 mg (renally dosed) Q 24 hours: with plan to treat for a total of 7 days through 1/18  · She is stable for discharge to [address] [place] [address] today  Aortoiliac occlusive disease ([facility] )   Assessment & Plan    · 12/2018 - pt s/p angiogram with stent fracture, placed new stent  · Continue Plavix and Coumadin therapy   · Stable, follow up with vascular surgery as an outpatient  Bradycardia   Assessment & Plan    · This appears to be patient's baseline on review of outpatient records  · Did have a few episodes with HR in the 40s, currently HR 60s on telemetry monitor  · Pt currently asymptomatic   · TSH WNL  Atrial fibrillation (HCC)   Assessment & Plan    · Rate controlled with lopressor 50 mg BID and Cardizem 120 mg daily  · INR therapeutic now at 2 47  · Continue Coumadin at 3 mg daily  · Monitor INR  · Goal INR 2-3  Seizure disorder ([facility] )   Assessment & Plan    · Currently stable, no active issues  · Continue with carbamazepine 100 mg TID, Depakote 500 mg daily  · Valproate acid level 32  · Tegretol level 9     Dementia   Assessment & Plan    · Mentation appears to be at baseline  · Continue fall and aspiration precautions  · PT/OT recommending home PT, patient to return to [place] upon discharge  Discharging Physician / Practitioner: Jai Servin PA-C  PCP: Mikayla Serna DO  Admission Date:   Admission Orders     Ordered        01/13/19 1408  Inpatient Admission  Once         01/13/19 0235  Place in Observation  Once             Discharge Date: 01/16/19    Resolved Problems  Date Reviewed: 1/16/2019          Resolved    TAM (acute kidney injury) ([facility] ) 1/15/2019     Resolved by  Suzy Ngo PA-C          Consultations During Hospital Stay:  · Infectious Disease  · PT    Procedures Performed:   · None    Significant Findings / Test Results:   · Urine culture showing Enterococcus and Providencia  · Blood cultures x 2 negative at 24 hours  · Procalcitonin negative  · CXR no acute cardiopulmonary disease  Test Results Pending at Discharge (will require follow up): · Final blood culture results  Outpatient Tests Requested:  · Monitoring of labs: CBC, BMP, PT/INR per physician  Complications:  None    Reason for Admission: Fever and weakness    Hospital Course: Esteban Arrieta is a 68 y o  female patient who originally presented to the hospital on 1/12/2019 due to fever and weakness  She has a chronic indwelling baca which was recently changed prior to admission  She was found to have a UTI with Enterococcus and Providencia  She was initially treated with Ceftazidime IV which was switched to po Levaquin  She was seen in consultation by ID for antibiotics recommendations  Her symptoms resolved and she has returned to her baseline  She was noted to have TAM which has resolved  She is recommended to continue Levaquin 250mg po daily through 1/18 (renally dosed)  She was seen by PT and [facility] has been set up for the patient  She will be returning to [facility] [place]  On the day of discharge, patient offered no complaints  No abdominal pain  No dysuria  She has been having good output from the baca  She is cleared for discharge today  Please see above list of diagnoses and related plan for additional information  Condition at Discharge: stable     Discharge Day Visit / Exam:     Subjective:  Patient feels well and is eager to go back to [place]  She denies any complaints  No dysuria  No abdominal pain  No CP  No SOB  She has baseline dementia  Vitals: Blood Pressure: 141/94 (01/16/19 1156)  Pulse: 63 (01/16/19 1156)  Temperature: 98 °F (36 7 °C) (01/16/19 1156)  Temp Source: Oral (01/16/19 1156)  Respirations: 18 (01/16/19 1156)  Height: 5' (152 4 cm) (01/13/19 0355)  Weight - Scale: 79 kg (174 lb 2 6 oz) (01/16/19 0600)  SpO2: 97 % (01/16/19 1156)  Exam:   Physical Exam   Constitutional: No distress  HENT:   Head: Normocephalic and atraumatic  Eyes: No scleral icterus  Cardiovascular: Normal rate  S1S2  Pulmonary/Chest: Effort normal and breath sounds normal  No respiratory distress  She has no wheezes  She has no rales  Abdominal: Soft  Bowel sounds are normal    Musculoskeletal: She exhibits no edema  Neurological: She is alert  Skin: She is not diaphoretic  Psychiatric:   Pleasantly confused which is her baseline  Vitals reviewed  Discussion with Family: Discussed with patient's daughter by phone  Discharge instructions/Information to patient and family:   See after visit summary for information provided to patient and family  Provisions for Follow-Up Care:  See after visit summary for information related to follow-up care and any pertinent home health orders  Disposition:     Group Home / [place] [facility] [place]  with [facility]  For Discharges to   [facility]:   · Not Applicable to this Patient - Not Applicable to this Patient    Planned Readmission: None     Discharge Statement:  I spent 40 minutes discharging the patient  This time was spent on the day of discharge   I had direct contact with the patient on the day of discharge  Greater than 50% of the total time was spent examining patient, answering all patient questions, arranging and discussing plan of care with patient as well as directly providing post-discharge instructions  Additional time then spent on discharge activities  Discharge Medications:  See after visit summary for reconciled discharge medications provided to patient and family        ** Please Note: This note has been constructed using a voice recognition system **

## 2019-01-16 NOTE — SOCIAL WORK
CM spoke with daughter Zana Mcintyre regarding DCP  Zana Mcintyre reports she wishes her mother to return to [facility] and will transport her herself  CM spoke with Ricardo Grove at [facility] that pt should be able to d/c today  Discussed recommendation for home PT - Zana Mcintyre, daughter, does not have a preference and prefers the agency that [facility] typically uses, per Ricardo Grove this is [facility]  Referral sent to [facility] and details added to AVS  Discussed with RN and SLIM  CM will continue to follow

## 2019-01-16 NOTE — NURSING NOTE
Pt HTN with 0700 vs  Pt given AM cardiac meds and BP rechecked   Pt now below parameters for prn BP med

## 2019-01-16 NOTE — PLAN OF CARE
Problem: Potential for Falls  Goal: Patient will remain free of falls  INTERVENTIONS:  - Assess patient frequently for physical needs  -  Identify cognitive and physical deficits and behaviors that affect risk of falls  -  [place] fall precautions as indicated by assessment   - Educate patient/family on patient safety including physical limitations  - Instruct patient to call for assistance with activity based on assessment  - Modify environment to reduce risk of injury  - Consider OT/PT consult to assist with strengthening/mobility   Outcome: Progressing      Problem: Prexisting or High Potential for Compromised Skin Integrity  Goal: Skin integrity is maintained or improved  INTERVENTIONS:  - Identify patients at risk for skin breakdown  - Assess and monitor skin integrity  - Assess and monitor nutrition and hydration status  - Monitor labs (i e  albumin)  - Assess for incontinence   - Turn and reposition patient  - Assist with mobility/ambulation  - Relieve pressure over bony prominences  - Avoid friction and shearing  - Provide appropriate hygiene as needed including keeping skin clean and dry  - Evaluate need for skin moisturizer/barrier cream  - Collaborate with interdisciplinary team (i e  Nutrition, Rehabilitation, etc )   - Patient/family teaching   Outcome: Progressing      Problem: NEUROSENSORY - ADULT  Goal: Achieves stable or improved neurological status  INTERVENTIONS  - Monitor and report changes in neurological status  - Initiate measures to prevent increased intracranial pressure  - Maintain blood pressure and fluid volume within ordered parameters to optimize cerebral perfusion  - Monitor temperature, glucose, and sodium or any other associated labs   Initiate appropriate interventions as ordered  - Monitor for seizure activity   - Administer anti-seizure medications as ordered   Outcome: Progressing    Goal: Absence of seizures  INTERVENTIONS  - Monitor for seizure activity  - Administer anti-seizure medications as ordered  - Monitor neurological status   Outcome: Progressing    Goal: Remains free of injury related to seizures activity  INTERVENTIONS  - Maintain airway, patient safety  and administer oxygen as ordered  - Monitor patient for seizure activity, document and report duration and description of seizure to physician/advanced practitioner  - If seizure occurs,  ensure patient safety during seizure  - Reorient patient post seizure  - Seizure pads on all 4 side rails  - Instruct patient/family to notify RN of any seizure activity including if an aura is experienced  - Instruct patient/family to call for assistance with activity based on nursing assessment  - Administer anti-seizure medications as ordered  - Monitor fetal well being   Outcome: Progressing    Goal: Achieves maximal functionality and self care  INTERVENTIONS  - Monitor swallowing and airway patency with patient fatigue and changes in neurological status  - Encourage and assist patient to increase activity and self care with guidance from rehab services  - Encourage visually impaired, hearing impaired and aphasic patients to use assistive/communication devices   Outcome: Progressing      Problem: GENITOURINARY - ADULT  Goal: Maintains or returns to baseline urinary function  INTERVENTIONS:  - Assess urinary function  - Encourage oral fluids to ensure adequate hydration  - Administer IV fluids as ordered to ensure adequate hydration  - Administer ordered medications as needed  - Offer frequent toileting  - Follow urinary retention protocol if ordered   Outcome: Progressing    Goal: Absence of urinary retention  INTERVENTIONS:  - Assess patients ability to void and empty bladder  - Monitor I/O  - Bladder scan as needed  - Discuss with physician/AP medications to alleviate retention as needed  - Discuss catheterization for long term situations as appropriate   Outcome: Progressing    Goal: Urinary catheter remains patent  INTERVENTIONS:  - Assess patency of urinary catheter  - If patient has a chronic baca, consider changing catheter if non-functioning  - Follow guidelines for intermittent irrigation of non-functioning urinary catheter   Outcome: Progressing

## 2019-01-16 NOTE — ASSESSMENT & PLAN NOTE
· Currently stable, no active issues  · Continue with carbamazepine 100 mg TID, Depakote 500 mg daily    · Valproate acid level 32  · Tegretol level 9

## 2019-01-16 NOTE — PHYSICAL THERAPY NOTE
Physical Therapy Progress Note     19 1502   Pain Assessment   Pain Assessment No/denies pain   Pain Score No Pain   Restrictions/Precautions   Other Precautions Cognitive; Chair Alarm;Telemetry; Seizure; Fall Risk   General   Chart Reviewed Yes   Response to Previous Treatment Patient with no complaints from previous session  Family/Caregiver Present No   Cognition   Arousal/Participation Alert; Responsive; Cooperative   Attention Attends with cues to redirect   Orientation Level Oriented to person;Disoriented to place; Disoriented to time;Disoriented to situation   Memory Decreased recall of biographical information;Decreased recall of recent events;Decreased recall of precautions   Following Commands Follows one step commands without difficulty   Comments Pt was identified by name and , agreeable to treatment  Transfers   Sit to Stand 5  Supervision   Additional items Assist x 1; Armrests; Increased time required;Verbal cues   Stand to Sit 5  Supervision   Additional items Assist x 1; Armrests; Increased time required;Verbal cues   Ambulation/Elevation   Gait pattern Decreased foot clearance; Inconsistent randi; Shuffling   Gait Assistance 5  Supervision   Additional items Assist x 1   Assistive Device Rolling walker   Distance 330'   Stair Management Assistance 4  Minimal assist  (CGA)   Additional items Assist x 1;Verbal cues; Tactile cues; Increased time required   Stair Management Technique Two rails; Step to pattern; Foreward   Number of Stairs 12   Balance   Static Sitting Normal   Dynamic Sitting Good   Static Standing Fair +   Dynamic Standing Fair   Ambulatory Fair   Activity Tolerance   Activity Tolerance Patient tolerated treatment well;Patient limited by fatigue   Nurse Made Aware yes, ok to see   Exercises   THR Sitting;20 reps;AROM; Bilateral   Assessment   Prognosis Good   Problem List Decreased strength;Decreased endurance; Impaired balance;Decreased mobility; Decreased cognition;Decreased safety awareness   Assessment Pt was found seated in bedside chair  All transfers and mobility required S assist   She was egar to get out of the hospital  She was able to ambulate increased distances this session compared to last  Instructed on and performed stair training with CGAx1 and demonstrated functional form  Pt was minimally fatigued post stair training but felt comfortable overall  She was very confused at times but able to hold a conversation  Pt had all needs met and call bell in reach  She would benefit from continued PT in order to promote safe and functional mobility  Barriers to Discharge Inaccessible home environment   Goals   Patient Goals "to go back to [place]"   STG Expiration Date 01/23/19   Short Term Goal #1 1  increase strength 1/2 grade to improve overall functional mobility, 2  perform transfers mod (I) to safely perform ADLs, 3  ambulate >300' mod (I) c RW to safely navigate hallways at [facility], 4  negotiate 10 stairs mod (I) to safely access bedroom   Treatment Day 2   Plan   Treatment/Interventions Functional transfer training;LE strengthening/ROM; Elevations; Therapeutic exercise; Endurance training;Patient/family training;Bed mobility;Gait training;Spoke to nursing   Progress Progressing toward goals   PT Frequency Other (Comment)  (3-5x/week)   Recommendation   Recommendation Home PT  (return to [facility] c PT)   Equipment Recommended Walker  (RW)   PT - OK to Discharge Yes   Additional Comments When medically cleared     Terrance Noe PTA

## 2019-01-16 NOTE — PROGRESS NOTES
Progress Note - Infectious Disease   Wm Warner 68 y o  female MRN: [id number]  Unit/Bed#: -01 Encounter: [id number]      Impression/Plan:  1  Possible complicated urinary tract infection:  Patient presented with atypical symptoms after recent change of her Davis catheter  UA was of course diffusely positive in the setting of chronic catheter and culture results were noted  Cultures noted with both Providencia and Enterococcus; suspect latter organism is a colonizer given "improvement" without coverage   -continue the patient on oral Levaquin 250 mg Q 24 hours  -would plan to treat for a total of 7 days through 1/18  -patient's mental status should be monitored closely on fluoroquinolone  -continue to trend fever curve/vitals while admitted  -additional supportive care as per primary     2  Acute kidney injury on chronic kidney disease:  Patient noted with elevation in her creatinine from baseline  This has been improving over the course of the patient's admission and may have been elevated in the setting of infection   -Levaquin has been renally dose adjusted  -would continue to monitor mental status on this medication  -continue to trend chemistry while admitted  -additional supportive care as per primary     3  Neurogenic bladder with chronic Davis catheter:  Patient remains with chronic Davis catheter due to history of neurogenic bladder  Would recommend close follow-up with Urology as an outpatient for frequent catheter changes      4  Penicillin allergy:  Patient has a history of penicillin allergy which she reports occurred when she was a child  She cannot tell me if she was ever rechallenge with the drug or if she tolerated other antibiotics or other adverse reactions to other drugs given her underlying dementia   -monitor for tolerance to Levaquin  -monitor mental status closely on Levaquin  -additional supportive care as per primary     5   Baseline dementia:  Patient is pleasant and conversant on exam   She seems to be at her reported baseline  No signs of CNS infection at this time  Continue to monitor mental status as the patient is being started on Levaquin today      Above plan discussed in detail with the patient  Primary service updated of the above      ID consult service will sign off at this time  Please call if any additional questions or concerns  Antibiotics:  Levaquin 2    24 hr events:  No acute events noted overnight on chart review  Patient currently afebrile  No new images  Urine culture results remain preliminary  Patient's other vitals are stable    Subjective:  Patient remains pleasantly confused  She currently denies any nausea, vomiting, chest pain, shortness of breath, diarrhea or rash  She tolerated breakfast this morning  She denies having any chest pain or palpitations  She again does not recall why she is here how long she has been here  Objective:  Vitals:  Temp:  [97 8 °F (36 6 °C)-98 7 °F (37 1 °C)] 98 °F (36 7 °C)  HR:  [48-66] 51  Resp:  [18-20] 20  BP: (124-185)/(63-92) 185/74  SpO2:  [92 %-97 %] 92 %  Temp (24hrs), Av 2 °F (36 8 °C), Min:97 8 °F (36 6 °C), Max:98 7 °F (37 1 °C)  Current: Temperature: 98 °F (36 7 °C)    Physical Exam:   General Appearance:  Alert, interactive, nontoxic, no acute distress  Throat: Oropharynx moist without lesions  Lungs:   Clear to auscultation bilaterally; no wheezes, rhonchi or rales; respirations unlabored on room air   Heart:  RRR; no rub or gallop; systolic murmur present   Abdomen:   Soft, non-tender, non-distended, positive bowel sounds  Extremities: No clubbing, cyanosis or edema   Neuro: Patient is alert and oriented to self only  She is able to answer questions and is really conversant  No cranial nerve deficits on exam   She is able to move her upper and lower extremities against gravity without issue  Skin: No new rashes or lesions  No new draining wounds noted         Labs, Imaging, & Other studies:   All pertinent labs and imaging studies were personally reviewed    Results from last 7 days  Lab Units 01/14/19  0521 01/13/19  0605 01/12/19  2324   WBC Thousand/uL 4 66 4 76 6 24   HEMOGLOBIN g/dL 11 0* 10 5* 10 7*   PLATELETS Thousands/uL 145* 130* 157       Results from last 7 days  Lab Units 01/14/19  0521 01/13/19  0605 01/12/19  2324   POTASSIUM mmol/L 3 9 3 6 3 7   CHLORIDE mmol/L 108 109* 106   CO2 mmol/L 27 28 30   BUN mg/dL 29* 31* 31*   CREATININE mg/dL 1 35* 1 42* 1 68*   EGFR ml/min/1 73sq m 39 37 30   CALCIUM mg/dL 9 4 9 1 9 4   AST U/L  --  19 22   ALT U/L  --  24 25   ALK PHOS U/L  --  50 51       Results from last 7 days  Lab Units 01/13/19  0127 01/12/19  2325 01/12/19  2324   BLOOD CULTURE   --   --  No Growth at 48 hrs  No Growth at 48 hrs     URINE CULTURE  >100,000 cfu/ml Providencia rettgeri*  >100,000 cfu/ml Enterococcus faecalis*  --   --    INFLUENZA B PCR   --  None Detected  --    RSV PCR   --  None Detected  --

## 2019-01-16 NOTE — PLAN OF CARE
Problem: Potential for Falls  Goal: Patient will remain free of falls  INTERVENTIONS:  - Assess patient frequently for physical needs  -  Identify cognitive and physical deficits and behaviors that affect risk of falls  -  [place] fall precautions as indicated by assessment   - Educate patient/family on patient safety including physical limitations  - Instruct patient to call for assistance with activity based on assessment  - Modify environment to reduce risk of injury  - Consider OT/PT consult to assist with strengthening/mobility   Outcome: Progressing      Problem: Prexisting or High Potential for Compromised Skin Integrity  Goal: Skin integrity is maintained or improved  INTERVENTIONS:  - Identify patients at risk for skin breakdown  - Assess and monitor skin integrity  - Assess and monitor nutrition and hydration status  - Monitor labs (i e  albumin)  - Assess for incontinence   - Turn and reposition patient  - Assist with mobility/ambulation  - Relieve pressure over bony prominences  - Avoid friction and shearing  - Provide appropriate hygiene as needed including keeping skin clean and dry  - Evaluate need for skin moisturizer/barrier cream  - Collaborate with interdisciplinary team (i e  Nutrition, Rehabilitation, etc )   - Patient/family teaching   Outcome: Progressing      Problem: NEUROSENSORY - ADULT  Goal: Achieves stable or improved neurological status  INTERVENTIONS  - Monitor and report changes in neurological status  - Initiate measures to prevent increased intracranial pressure  - Maintain blood pressure and fluid volume within ordered parameters to optimize cerebral perfusion  - Monitor temperature, glucose, and sodium or any other associated labs   Initiate appropriate interventions as ordered  - Monitor for seizure activity   - Administer anti-seizure medications as ordered   Outcome: Adequate for Discharge    Goal: Absence of seizures  INTERVENTIONS  - Monitor for seizure activity  - Administer anti-seizure medications as ordered  - Monitor neurological status   Outcome: Adequate for Discharge    Goal: Remains free of injury related to seizures activity  INTERVENTIONS  - Maintain airway, patient safety  and administer oxygen as ordered  - Monitor patient for seizure activity, document and report duration and description of seizure to physician/advanced practitioner  - If seizure occurs,  ensure patient safety during seizure  - Reorient patient post seizure  - Seizure pads on all 4 side rails  - Instruct patient/family to notify RN of any seizure activity including if an aura is experienced  - Instruct patient/family to call for assistance with activity based on nursing assessment  - Administer anti-seizure medications as ordered  - Monitor fetal well being   Outcome: Adequate for Discharge    Goal: Achieves maximal functionality and self care  INTERVENTIONS  - Monitor swallowing and airway patency with patient fatigue and changes in neurological status  - Encourage and assist patient to increase activity and self care with guidance from rehab services  - Encourage visually impaired, hearing impaired and aphasic patients to use assistive/communication devices   Outcome: Adequate for Discharge      Problem: GENITOURINARY - ADULT  Goal: Maintains or returns to baseline urinary function  INTERVENTIONS:  - Assess urinary function  - Encourage oral fluids to ensure adequate hydration  - Administer IV fluids as ordered to ensure adequate hydration  - Administer ordered medications as needed  - Offer frequent toileting  - Follow urinary retention protocol if ordered   Outcome: Adequate for Discharge    Goal: Absence of urinary retention  INTERVENTIONS:  - Assess patients ability to void and empty bladder  - Monitor I/O  - Bladder scan as needed  - Discuss with physician/AP medications to alleviate retention as needed  - Discuss catheterization for long term situations as appropriate   Outcome: Not Progressing    Goal: Urinary catheter remains patent  INTERVENTIONS:  - Assess patency of urinary catheter  - If patient has a chronic baca, consider changing catheter if non-functioning  - Follow guidelines for intermittent irrigation of non-functioning urinary catheter   Outcome: Adequate for Discharge      Problem: DISCHARGE PLANNING - CARE MANAGEMENT  Goal: Discharge to post-acute care or home with appropriate resources  INTERVENTIONS:  - Conduct assessment to determine patient/family and health care team treatment goals, and need for post-acute services based on payer coverage, community resources, and patient preferences, and barriers to discharge  - Address psychosocial, clinical, and financial barriers to discharge as identified in assessment in conjunction with the patient/family and health care team  - Arrange appropriate level of post-acute services according to patients   needs and preference and payer coverage in collaboration with the physician and health care team  - Communicate with and update the patient/family, physician, and health care team regarding progress on the discharge plan  - Arrange appropriate transportation to post-acute venues   Outcome: Progressing

## 2019-01-16 NOTE — PLAN OF CARE
Problem: PHYSICAL THERAPY ADULT  Goal: Performs mobility at highest level of function for planned discharge setting  See evaluation for individualized goals  Treatment/Interventions: Functional transfer training, LE strengthening/ROM, Elevations, Therapeutic exercise, Endurance training, Patient/family training, Bed mobility, Gait training, Spoke to nursing          See flowsheet documentation for full assessment, interventions and recommendations  Outcome: Progressing  Prognosis: Good  Problem List: Decreased strength, Decreased endurance, Impaired balance, Decreased mobility, Decreased cognition, Decreased safety awareness  Assessment: Pt was found seated in bedside chair  All transfers and mobility required S assist   She was egar to get out of the hospital  She was able to ambulate increased distances this session compared to last  Instructed on and performed stair training with CGAx1 and demonstrated functional form  Pt was minimally fatigued post stair training but felt comfortable overall  She was very confused at times but able to hold a conversation  Pt had all needs met and call bell in reach  She would benefit from continued PT in order to promote safe and functional mobility  Barriers to Discharge: Inaccessible home environment     Recommendation: Home PT (return to [facility] PT)     PT - OK to Discharge: Yes    See flowsheet documentation for full assessment

## 2019-01-16 NOTE — ASSESSMENT & PLAN NOTE
· [place] appears to be at baseline  · Continue fall and aspiration precautions  · PT/OT recommending home PT, patient to return to [facility] upon discharge

## 2019-01-16 NOTE — ASSESSMENT & PLAN NOTE
· This appears to be patient's baseline on review of outpatient records  · Did have a few episodes with HR in the 40s, currently HR 60s on telemetry monitor  · Pt currently asymptomatic   · TSH WNL

## 2019-01-16 NOTE — ASSESSMENT & PLAN NOTE
· Rate controlled with lopressor 50 mg BID and Cardizem 120 mg daily  · INR therapeutic now at 2 47  · Continue Coumadin at 3 mg daily  · Monitor INR  · Goal INR 2-3

## 2019-01-16 NOTE — ASSESSMENT & PLAN NOTE
· Patient with chronic indwelling baca catheter due to neurogenic bladder  · Recent exchange on Friday at [facility]  · Urine culture (+) for enterococcus and providencia   · Currently being treated with IV Ceftazidime which was transitioned to Levaquin  · Blood cultures negative x 48 hours  · Patient's symptoms have significant improved throughout hospitalization  · Procalcitonin negative, trending down  · Appreciate ID input: continue the patient on oral Levaquin 250 mg (renally dosed) Q 24 hours: with plan to treat for a total of 7 days through 1/18  · She is stable for discharge to [address] [place] [address] today

## 2019-01-18 LAB
BACTERIA BLD CULT: NORMAL
BACTERIA BLD CULT: NORMAL
BACTERIA UR CULT: ABNORMAL

## 2019-01-19 NOTE — PROGRESS NOTES
Called patient for the 1st time to discuss laboratory results  It appears as if she has an indwelling Davis catheter so she is likely colonized    I would discussed what symptoms she may have if she is completely asymptomatic I would recommend that she follow up with her urologist

## 2019-01-21 ENCOUNTER — HOSPITAL ENCOUNTER (EMERGENCY)
Facility: HOSPITAL | Age: 74
Discharge: HOME | DRG: 153 | End: 2019-01-21
Attending: EMERGENCY MEDICINE | Admitting: EMERGENCY MEDICINE
Payer: MEDICARE

## 2019-01-21 VITALS
HEIGHT: 62 IN | HEART RATE: 57 BPM | RESPIRATION RATE: 18 BRPM | WEIGHT: 187.17 LBS | OXYGEN SATURATION: 94 % | DIASTOLIC BLOOD PRESSURE: 74 MMHG | SYSTOLIC BLOOD PRESSURE: 140 MMHG | BODY MASS INDEX: 34.44 KG/M2 | TEMPERATURE: 98.7 F

## 2019-01-21 DIAGNOSIS — Z46.6 ENCOUNTER FOR FOLEY CATHETER REPLACEMENT: Primary | ICD-10-CM

## 2019-01-21 PROCEDURE — 99283 EMERGENCY DEPT VISIT LOW MDM: CPT

## 2019-01-21 NOTE — ED NOTES
Pt pulled out baca, balloon intact and full, no bleeding noted at this time     [place], RN  01/21/19 4825

## 2019-01-21 NOTE — ED PROVIDER NOTES
History  Chief Complaint   Patient presents with    Urinary Catheter Insertion or Check     catheter fell out over night adn bartool only had 16g, she needs 20g     HPI    Prior to Admission Medications   Prescriptions Last Dose Informant Patient Reported? Taking?    Cholecalciferol (VITAMIN D) 2000 units CAPS  Outside Facility (Specify) No No   Sig: Take 1 capsule (2,000 Units total) by mouth daily   Citric Vq-Chclebepvdc-Ts Carb (RENACIDIN) SOLN  Outside Facility (Specify) Yes No   Sig: Indications: 60ml to irrigate baca once a week on Friday   Sennosides-Docusate Sodium (SENNA PLUS PO)  Outside Facility (Specify) Yes No   Sig: Take by mouth daily as needed   acetaminophen (TYLENOL) 500 mg tablet  Outside Facility (Specify) Yes No   Sig: Take 500 mg by mouth every 6 (six) hours as needed for mild pain   albuterol (2 5 mg/3 mL) 0 083 % nebulizer solution  Outside Facility (Specify) No No   Sig: Take 1 vial (2 5 mg total) by nebulization every 6 (six) hours as needed for wheezing   albuterol (PROVENTIL HFA,VENTOLIN HFA) 90 mcg/act inhaler  Outside Facility (Specify) No No   Sig: Inhale 2 puffs every 6 (six) hours as needed for wheezing   allopurinol (ZYLOPRIM) 300 mg tablet  Outside Facility (Specify) No No   Sig: Take 1 tablet (300 mg total) by mouth every morning for 14 days   atorvastatin (LIPITOR) 10 mg tablet  Outside Facility (Specify) No No   Sig: Take 1 tablet (10 mg total) by mouth daily   betamethasone dipropionate (DIPROSONE) 0 05 % cream  Outside Facility (Specify) Yes No   Sig: Apply topically 3 (three) times a day   buPROPion (WELLBUTRIN SR) 150 mg 12 hr tablet  Outside Facility (Specify) No No   Sig: Take 1 tablet (150 mg total) by mouth daily   carBAMazepine (TEGretol) 100 mg chewable tablet  Outside Facility (Specify) No No   Sig: Chew 1 tablet (100 mg total) 3 (three) times a day   clopidogrel (PLAVIX) 75 mg tablet  Outside Facility (Specify) No No   Sig: Take 1 tablet (75 mg total) by mouth daily clotrimazole (LOTRIMIN) 1 % cream  Outside Facility (Specify) Yes No   Sig: Apply topically 3 (three) times a day   diltiazem (CARDIZEM SR) 90 mg 12 hr capsule  Outside Facility (Specify) No No   Sig: Take 2 capsules (180 mg total) by mouth every 12 (twelve) hours   divalproex sodium (DEPAKOTE ER) 250 mg 24 hr tablet   No No   Sig: Take 1 tablet (250 mg total) by mouth 2 (two) times a day   fenofibrate micronized (LOFIBRA) 67 MG capsule  Outside Facility (Specify) No No   Sig: Take 1 capsule (67 mg total) by mouth daily with breakfast   ferrous sulfate 325 (65 Fe) mg tablet  Outside Facility (Specify) No No   Sig: Take 1 tablet (325 mg total) by mouth 2 (two) times a day   fluticasone-vilanterol (BREO ELLIPTA) 100-25 mcg/inh inhaler  Outside Facility (Specify) No No   Sig: Inhale 1 puff daily   furosemide (LASIX) 40 mg tablet  Outside Facility (Specify) No No   Sig: Take 1 tablet (40 mg total) by mouth daily   levothyroxine 88 mcg tablet  Outside Facility (Specify) No No   Sig: Take 1 tablet (88 mcg total) by mouth daily   Patient taking differently: Take 100 mcg by mouth daily     metoprolol tartrate (LOPRESSOR) 50 mg tablet  Outside Facility (Specify) No No   Sig: Take 1 tablet (50 mg total) by mouth every 12 (twelve) hours   nitroglycerin (NITROSTAT) 0 4 mg SL tablet  Outside Facility (Specify) No No   Sig: Place 1 tablet (0 4 mg total) under the tongue every 5 (five) minutes as needed for chest pain   potassium chloride (K-DUR,KLOR-CON) 20 mEq tablet  Outside Facility (Specify) No No   Sig: Take 1 tablet (20 mEq total) by mouth daily   saccharomyces boulardii (FLORASTOR) 250 mg capsule   No No   Sig: Take 1 capsule (250 mg total) by mouth 2 (two) times a day   warfarin (COUMADIN) 3 mg tablet  Outside Facility (Specify) No No   Sig: One tablet daily or as otherwise directed        Facility-Administered Medications: None       Past Medical History:   Diagnosis Date    Atrial fibrillation ([facility] )     Brain aneurysm     CAD (coronary artery disease)     Cardiac disease     had stents 12 years ago in [address] CHF (congestive heart failure) ([place] [address] )     CKD (chronic kidney disease)     COPD [facility] obstructive [facility] disease) ([facility] )     Dementia     Dementia     Disease of thyroid gland     GERD (gastroesophageal reflux disease)     Hyperlipidemia     Hyperlipidemia     Hypertension     MI (myocardial infarction) ([facility] )     Migraine     Renal disorder     Seizures ([place] [address] )     Stroke [facility])        Past Surgical History:   Procedure Laterality Date    APPENDECTOMY      ARTERIOGRAM N/A 12/7/2018    Procedure: ARTERIOGRAM WITH STENT PLACEMENT;  Surgeon: Leisa Reddy MD;  [facility];  Service: Vascular    BLADDER TUMOR EXCISION      BRAIN SURGERY      1998  clip right frontal lobe    CARDIAC SURGERY      COLONOSCOPY      CORONARY STENT PLACEMENT      5 stents    EYE SURGERY      IR ABDOMINAL ANGIOGRAPHY / INTERVENTION  12/7/2018    MANDIBLE FRACTURE SURGERY      TONSILLECTOMY      TUBAL LIGATION      UTERINE FIBROID SURGERY         Family History   Problem Relation Age of Onset    Heart disease Father     Parkinsonism Father     Macular degeneration Mother     Glaucoma Mother     Diabetes Brother     Heart disease Brother      I have reviewed and agree with the history as documented  Social History   Substance Use Topics    Smoking status: Former Smoker     Packs/day: 5 00     Years: 40 00     Quit date: 9/7/2007    Smokeless tobacco: Never Used    Alcohol use No        Review of Systems    Physical Exam  Physical Exam   Constitutional: She appears well-developed and well-nourished  No distress  HENT:   Head: Normocephalic and atraumatic  Mouth/Throat: Oropharynx is clear and moist    Eyes: Pupils are equal, round, and reactive to light  Conjunctivae are normal    Neck: Normal range of motion  No tracheal deviation present     Cardiovascular: Normal rate, regular rhythm, normal heart sounds and intact distal pulses  Pulmonary/Chest: Effort normal and breath sounds normal  No respiratory distress  Abdominal: Soft  Bowel sounds are normal  She exhibits no distension  There is no tenderness  Neurological: She is alert  She has normal strength  GCS eye subscore is 4  GCS verbal subscore is 5  GCS motor subscore is 6  Oriented to self, says is in nursing home  Not able to provide any short term history  Has history of dementia   Skin: Skin is warm and dry  Psychiatric: She has a normal mood and affect  Her behavior is normal    Nursing note and vitals reviewed  Vital Signs  ED Triage Vitals [01/21/19 0735]   Temperature Pulse Respirations Blood Pressure SpO2   98 7 °F (37 1 °C) 57 18 140/74 94 %      Temp src Heart Rate Source Patient Position - Orthostatic VS BP Location FiO2 (%)   -- -- -- -- --      Pain Score       No Pain           Vitals:    01/21/19 0735   BP: 140/74   Pulse: 57       Visual Acuity      ED Medications  Medications - No data to display    Diagnostic Studies  Results Reviewed     None                 No orders to display              Procedures  Procedures       Phone Contacts  ED Phone Contact    ED Course           Identification of Seniors at Risk      Most Recent Value   (ISAR) Identification of Seniors at Risk   Before the illness or injury that brought you to the Emergency, did you need someone to help you on a regular basis? 0 Filed at: 01/21/2019 0738   In the last 24 hours, have you needed more help than usual?  --   Have you been hospitalized for one or more nights during the past 6 months?  --   In general, do you see well?  --   In general, do you have serious problems with your memory?   --   Do you take more than three different medications every day?  --   ISAR Score  0 Filed at: 01/21/2019 9776                          [facility]  Number of Diagnoses or Management Options  Encounter for Davis catheter replacement: new and does not require workup  Diagnosis management comments: This is a 52-year-old female who presents here today needing her chronic indwelling Davis catheter replaced  It reportedly fell out overnight at her facility, and they did not have the appropriate size to replace it  The patient states she did not pull at it, but that it just came out  She denies any problems with it working correctly prior to coming out  She denies pulling at it  She denies any bleeding or anything coming out since then  She has the sensation of needing to urinate but has been unable to do so  She denies any abdominal pain or other complaints  According to prior notes, she has a chronic Davis due to neurogenic bladder  ROS: Otherwise negative, unless stated as above  She is well-appearing, in no acute distress  She has no abdominal tenderness  The remainder of her exam is unremarkable  We will replace her Davis, and discharge her back to her facility  [facility] Time    Disposition  Final diagnoses:   Encounter for Davis catheter replacement     Time reflects when diagnosis was documented in both MDM as applicable and the Disposition within this note     Time User Action Codes Description Comment    1/21/2019  9:03 AM Priscilla Falcon Add [Z46 6] Encounter for Davis catheter replacement       ED Disposition     ED Disposition Condition Comment    Discharge  [facility] discharge to home/self care      Condition at discharge: Good        Follow-up Information     Follow up With Specialties Details Why Contact Info Additional Information    your urologist  Schedule an appointment as soon as possible for a visit As needed      [facility] Schedule an appointment as soon as possible for a visit to follow up on your catheter if you do not have a urologist [address] [facility] [id number]    [facility] [phone], [place], [place], [address]          Discharge Medication List as of 1/21/2019  9:04 AM      CONTINUE these medications which have NOT CHANGED    Details   acetaminophen (TYLENOL) 500 mg tablet Take 500 mg by mouth every 6 (six) hours as needed for mild pain, Historical Med      albuterol (2 5 mg/3 mL) 0 083 % nebulizer solution Take 1 vial (2 5 mg total) by nebulization every 6 (six) hours as needed for wheezing, Starting Fri 12/14/2018, Print      albuterol (PROVENTIL HFA,VENTOLIN HFA) 90 mcg/act inhaler Inhale 2 puffs every 6 (six) hours as needed for wheezing, Starting Fri 12/14/2018, Print      allopurinol (ZYLOPRIM) 300 mg tablet Take 1 tablet (300 mg total) by mouth every morning for 14 days, Starting Fri 12/14/2018, Until Mon 1/7/2019, Print      atorvastatin (LIPITOR) 10 mg tablet Take 1 tablet (10 mg total) by mouth daily, Starting Fri 12/14/2018, Print      betamethasone dipropionate (DIPROSONE) 0 05 % cream Apply topically 3 (three) times a day, Historical Med      buPROPion (WELLBUTRIN SR) 150 mg 12 hr tablet Take 1 tablet (150 mg total) by mouth daily, Starting Fri 12/14/2018, Print      carBAMazepine (TEGretol) 100 mg chewable tablet Chew 1 tablet (100 mg total) 3 (three) times a day, Starting Fri 12/14/2018, Print      Cholecalciferol (VITAMIN D) 2000 units CAPS Take 1 capsule (2,000 Units total) by mouth daily, Starting Fri 12/14/2018, Print      Citric Co-Clqecjwzxrc-Ip Carb (RENACIDIN) SOLN Indications: 60ml to irrigate baca once a week on Friday, Historical Med      clopidogrel (PLAVIX) 75 mg tablet Take 1 tablet (75 mg total) by mouth daily, Starting Fri 12/14/2018, Print      clotrimazole (LOTRIMIN) 1 % cream Apply topically 3 (three) times a day, Historical Med      diltiazem (CARDIZEM SR) 90 mg 12 hr capsule Take 2 capsules (180 mg total) by mouth every 12 (twelve) hours, Starting Fri 12/14/2018, Print      divalproex sodium (DEPAKOTE ER) 250 mg 24 hr tablet Take 1 tablet (250 mg total) by mouth 2 (two) times a day, Starting Wed 1/16/2019, No Print      fenofibrate micronized (LOFIBRA) 67 MG capsule Take 1 capsule (67 mg total) by mouth daily with breakfast, Starting Fri 12/14/2018, Print      ferrous sulfate 325 (65 Fe) mg tablet Take 1 tablet (325 mg total) by mouth 2 (two) times a day, Starting Fri 12/14/2018, Print      fluticasone-vilanterol (BREO ELLIPTA) 100-25 mcg/inh inhaler Inhale 1 puff daily, Starting Fri 12/14/2018, Print      furosemide (LASIX) 40 mg tablet Take 1 tablet (40 mg total) by mouth daily, Starting u 12/27/2018, Normal      levothyroxine 88 mcg tablet Take 1 tablet (88 mcg total) by mouth daily, Starting Fri 12/14/2018, Print      metoprolol tartrate (LOPRESSOR) 50 mg tablet Take 1 tablet (50 mg total) by mouth every 12 (twelve) hours, Starting Fri 12/14/2018, Print      nitroglycerin (NITROSTAT) 0 4 mg SL tablet Place 1 tablet (0 4 mg total) under the tongue every 5 (five) minutes as needed for chest pain, Starting Sat 5/12/2018, Normal      potassium chloride (K-DUR,KLOR-CON) 20 mEq tablet Take 1 tablet (20 mEq total) by mouth daily, Starting Fri 12/14/2018, Print      saccharomyces boulardii (FLORASTOR) 250 mg capsule Take 1 capsule (250 mg total) by mouth 2 (two) times a day, Starting Wed 1/16/2019, No Print      Sennosides-Docusate Sodium (SENNA PLUS PO) Take by mouth daily as needed, Historical Med      warfarin (COUMADIN) 3 mg tablet One tablet daily or as otherwise directed  , Print           No discharge procedures on file      ED Provider  Electronically Signed by           Aimee Malik MD  01/23/19 8708

## 2019-01-21 NOTE — DISCHARGE INSTRUCTIONS
Follow up with the urologist as needed for further evaluation and long-term management of your catheter

## 2019-01-22 ENCOUNTER — TELEPHONE (OUTPATIENT)
Dept: EMERGENCY DEPT | Facility: HOSPITAL | Age: 74
End: 2019-01-22

## 2019-01-24 ENCOUNTER — APPOINTMENT (EMERGENCY)
Dept: RADIOLOGY | Facility: HOSPITAL | Age: 74
DRG: 153 | End: 2019-01-24
Payer: MEDICARE

## 2019-01-24 ENCOUNTER — HOSPITAL ENCOUNTER (INPATIENT)
Facility: HOSPITAL | Age: 74
LOS: 2 days | Discharge: HOME HEALTH | DRG: 153 | End: 2019-01-26
Attending: EMERGENCY MEDICINE | Admitting: INTERNAL MEDICINE
Payer: MEDICARE

## 2019-01-24 DIAGNOSIS — G93.40 ACUTE ENCEPHALOPATHY: ICD-10-CM

## 2019-01-24 DIAGNOSIS — J18.9 HCAP (HEALTHCARE-ASSOCIATED PNEUMONIA): Primary | ICD-10-CM

## 2019-01-24 LAB
ALBUMIN SERPL BCP-MCNC: 3.4 G/DL (ref 3.5–5)
ALP SERPL-CCNC: 64 U/L (ref 46–116)
ALT SERPL W P-5'-P-CCNC: 23 U/L (ref 12–78)
ANION GAP SERPL CALCULATED.3IONS-SCNC: 9 MMOL/L (ref 4–13)
APTT PPP: 34 SECONDS (ref 26–38)
AST SERPL W P-5'-P-CCNC: 25 U/L (ref 5–45)
ATRIAL RATE: 58 BPM
BACTERIA UR QL AUTO: ABNORMAL /HPF
BASE EX.OXY STD BLDV CALC-SCNC: 93 % (ref 60–80)
BASE EXCESS BLDV CALC-SCNC: 2.3 MMOL/L
BASOPHILS # BLD AUTO: 0.02 THOUSANDS/ΜL (ref 0–0.1)
BASOPHILS NFR BLD AUTO: 0 % (ref 0–1)
BILIRUB DIRECT SERPL-MCNC: 0.12 MG/DL (ref 0–0.2)
BILIRUB SERPL-MCNC: 0.3 MG/DL (ref 0.2–1)
BILIRUB UR QL STRIP: NEGATIVE
BUN SERPL-MCNC: 30 MG/DL (ref 5–25)
CALCIUM SERPL-MCNC: 9.4 MG/DL (ref 8.3–10.1)
CHLORIDE SERPL-SCNC: 107 MMOL/L (ref 100–108)
CLARITY UR: CLEAR
CO2 SERPL-SCNC: 29 MMOL/L (ref 21–32)
COLOR UR: YELLOW
CREAT SERPL-MCNC: 1.42 MG/DL (ref 0.6–1.3)
EOSINOPHIL # BLD AUTO: 0.19 THOUSAND/ΜL (ref 0–0.61)
EOSINOPHIL NFR BLD AUTO: 3 % (ref 0–6)
ERYTHROCYTE [DISTWIDTH] IN BLOOD BY AUTOMATED COUNT: 13.4 % (ref 11.6–15.1)
GFR SERPL CREATININE-BSD FRML MDRD: 37 ML/MIN/1.73SQ M
GLUCOSE SERPL-MCNC: 109 MG/DL (ref 65–140)
GLUCOSE UR STRIP-MCNC: NEGATIVE MG/DL
HCO3 BLDV-SCNC: 27.2 MMOL/L (ref 24–30)
HCT VFR BLD AUTO: 34.2 % (ref 34.8–46.1)
HGB BLD-MCNC: 11 G/DL (ref 11.5–15.4)
HGB UR QL STRIP.AUTO: ABNORMAL
IMM GRANULOCYTES # BLD AUTO: 0.04 THOUSAND/UL (ref 0–0.2)
IMM GRANULOCYTES NFR BLD AUTO: 1 % (ref 0–2)
INR PPP: 1.63 (ref 0.86–1.17)
KETONES UR STRIP-MCNC: NEGATIVE MG/DL
LACTATE SERPL-SCNC: 0.8 MMOL/L (ref 0.5–2)
LEUKOCYTE ESTERASE UR QL STRIP: ABNORMAL
LYMPHOCYTES # BLD AUTO: 1.14 THOUSANDS/ΜL (ref 0.6–4.47)
LYMPHOCYTES NFR BLD AUTO: 20 % (ref 14–44)
MCH RBC QN AUTO: 33 PG (ref 26.8–34.3)
MCHC RBC AUTO-ENTMCNC: 32.2 G/DL (ref 31.4–37.4)
MCV RBC AUTO: 103 FL (ref 82–98)
MONOCYTES # BLD AUTO: 0.66 THOUSAND/ΜL (ref 0.17–1.22)
MONOCYTES NFR BLD AUTO: 12 % (ref 4–12)
NEUTROPHILS # BLD AUTO: 3.58 THOUSANDS/ΜL (ref 1.85–7.62)
NEUTS SEG NFR BLD AUTO: 64 % (ref 43–75)
NITRITE UR QL STRIP: NEGATIVE
NON-SQ EPI CELLS URNS QL MICRO: ABNORMAL /HPF
NRBC BLD AUTO-RTO: 0 /100 WBCS
NT-PROBNP SERPL-MCNC: 1236 PG/ML
O2 CT BLDV-SCNC: 15.2 ML/DL
P AXIS: 50 DEGREES
PCO2 BLDV: 43.6 MM HG (ref 42–50)
PH BLDV: 7.41 [PH] (ref 7.3–7.4)
PH UR STRIP.AUTO: 6.5 [PH] (ref 4.5–8)
PLATELET # BLD AUTO: 154 THOUSANDS/UL (ref 149–390)
PMV BLD AUTO: 12.2 FL (ref 8.9–12.7)
PO2 BLDV: 76.6 MM HG (ref 35–45)
POTASSIUM SERPL-SCNC: 4 MMOL/L (ref 3.5–5.3)
PROT SERPL-MCNC: 7.3 G/DL (ref 6.4–8.2)
PROT UR STRIP-MCNC: ABNORMAL MG/DL
PROTHROMBIN TIME: 19.1 SECONDS (ref 11.8–14.2)
QRS AXIS: 8 DEGREES
QRSD INTERVAL: 100 MS
QT INTERVAL: 448 MS
QTC INTERVAL: 439 MS
RBC # BLD AUTO: 3.33 MILLION/UL (ref 3.81–5.12)
RBC #/AREA URNS AUTO: ABNORMAL /HPF
SODIUM SERPL-SCNC: 145 MMOL/L (ref 136–145)
SP GR UR STRIP.AUTO: 1.01 (ref 1–1.03)
T WAVE AXIS: 36 DEGREES
TROPONIN I SERPL-MCNC: <0.02 NG/ML
UROBILINOGEN UR QL STRIP.AUTO: 0.2 E.U./DL
VENTRICULAR RATE: 58 BPM
WBC # BLD AUTO: 5.63 THOUSAND/UL (ref 4.31–10.16)
WBC #/AREA URNS AUTO: ABNORMAL /HPF

## 2019-01-24 PROCEDURE — 99285 EMERGENCY DEPT VISIT HI MDM: CPT

## 2019-01-24 PROCEDURE — 85730 THROMBOPLASTIN TIME PARTIAL: CPT | Performed by: PHYSICIAN ASSISTANT

## 2019-01-24 PROCEDURE — 85025 COMPLETE CBC W/AUTO DIFF WBC: CPT | Performed by: PHYSICIAN ASSISTANT

## 2019-01-24 PROCEDURE — 36415 COLL VENOUS BLD VENIPUNCTURE: CPT | Performed by: PHYSICIAN ASSISTANT

## 2019-01-24 PROCEDURE — 80048 BASIC METABOLIC PNL TOTAL CA: CPT | Performed by: PHYSICIAN ASSISTANT

## 2019-01-24 PROCEDURE — 93005 ELECTROCARDIOGRAM TRACING: CPT

## 2019-01-24 PROCEDURE — 87040 BLOOD CULTURE FOR BACTERIA: CPT | Performed by: PHYSICIAN ASSISTANT

## 2019-01-24 PROCEDURE — 84484 ASSAY OF TROPONIN QUANT: CPT | Performed by: PHYSICIAN ASSISTANT

## 2019-01-24 PROCEDURE — 93010 ELECTROCARDIOGRAM REPORT: CPT | Performed by: INTERNAL MEDICINE

## 2019-01-24 PROCEDURE — 83605 ASSAY OF LACTIC ACID: CPT | Performed by: PHYSICIAN ASSISTANT

## 2019-01-24 PROCEDURE — 71046 X-RAY EXAM CHEST 2 VIEWS: CPT

## 2019-01-24 PROCEDURE — 83880 ASSAY OF NATRIURETIC PEPTIDE: CPT | Performed by: PHYSICIAN ASSISTANT

## 2019-01-24 PROCEDURE — 81001 URINALYSIS AUTO W/SCOPE: CPT | Performed by: PHYSICIAN ASSISTANT

## 2019-01-24 PROCEDURE — 85610 PROTHROMBIN TIME: CPT | Performed by: PHYSICIAN ASSISTANT

## 2019-01-24 PROCEDURE — 82805 BLOOD GASES W/O2 SATURATION: CPT | Performed by: PHYSICIAN ASSISTANT

## 2019-01-24 PROCEDURE — 80076 HEPATIC FUNCTION PANEL: CPT | Performed by: PHYSICIAN ASSISTANT

## 2019-01-24 RX ORDER — VANCOMYCIN HYDROCHLORIDE 1 G/200ML
15 INJECTION, SOLUTION INTRAVENOUS ONCE
Status: COMPLETED | OUTPATIENT
Start: 2019-01-24 | End: 2019-01-25

## 2019-01-24 RX ADMIN — Medication 1000 MG: at 23:43

## 2019-01-25 PROBLEM — G93.40 ACUTE ENCEPHALOPATHY: Status: ACTIVE | Noted: 2018-03-24

## 2019-01-25 PROBLEM — J18.9 HCAP (HEALTHCARE-ASSOCIATED PNEUMONIA): Status: ACTIVE | Noted: 2019-01-25

## 2019-01-25 LAB
ANION GAP SERPL CALCULATED.3IONS-SCNC: 10 MMOL/L (ref 4–13)
BASOPHILS # BLD AUTO: 0.02 THOUSANDS/ΜL (ref 0–0.1)
BASOPHILS NFR BLD AUTO: 0 % (ref 0–1)
BUN SERPL-MCNC: 26 MG/DL (ref 5–25)
CALCIUM SERPL-MCNC: 9 MG/DL (ref 8.3–10.1)
CHLORIDE SERPL-SCNC: 108 MMOL/L (ref 100–108)
CO2 SERPL-SCNC: 27 MMOL/L (ref 21–32)
CREAT SERPL-MCNC: 1.34 MG/DL (ref 0.6–1.3)
EOSINOPHIL # BLD AUTO: 0.19 THOUSAND/ΜL (ref 0–0.61)
EOSINOPHIL NFR BLD AUTO: 4 % (ref 0–6)
ERYTHROCYTE [DISTWIDTH] IN BLOOD BY AUTOMATED COUNT: 13.6 % (ref 11.6–15.1)
FLUAV AG SPEC QL: NORMAL
FLUBV AG SPEC QL: NORMAL
GFR SERPL CREATININE-BSD FRML MDRD: 40 ML/MIN/1.73SQ M
GLUCOSE SERPL-MCNC: 102 MG/DL (ref 65–140)
HCT VFR BLD AUTO: 32.2 % (ref 34.8–46.1)
HGB BLD-MCNC: 10 G/DL (ref 11.5–15.4)
IMM GRANULOCYTES # BLD AUTO: 0.03 THOUSAND/UL (ref 0–0.2)
IMM GRANULOCYTES NFR BLD AUTO: 1 % (ref 0–2)
LYMPHOCYTES # BLD AUTO: 1.07 THOUSANDS/ΜL (ref 0.6–4.47)
LYMPHOCYTES NFR BLD AUTO: 24 % (ref 14–44)
MCH RBC QN AUTO: 33.2 PG (ref 26.8–34.3)
MCHC RBC AUTO-ENTMCNC: 31.1 G/DL (ref 31.4–37.4)
MCV RBC AUTO: 107 FL (ref 82–98)
MONOCYTES # BLD AUTO: 0.65 THOUSAND/ΜL (ref 0.17–1.22)
MONOCYTES NFR BLD AUTO: 14 % (ref 4–12)
NEUTROPHILS # BLD AUTO: 2.57 THOUSANDS/ΜL (ref 1.85–7.62)
NEUTS SEG NFR BLD AUTO: 57 % (ref 43–75)
NRBC BLD AUTO-RTO: 0 /100 WBCS
PLATELET # BLD AUTO: 124 THOUSANDS/UL (ref 149–390)
PMV BLD AUTO: 12 FL (ref 8.9–12.7)
POTASSIUM SERPL-SCNC: 3.8 MMOL/L (ref 3.5–5.3)
PROCALCITONIN SERPL-MCNC: 0.1 NG/ML
RBC # BLD AUTO: 3.01 MILLION/UL (ref 3.81–5.12)
RSV B RNA SPEC QL NAA+PROBE: NORMAL
SODIUM SERPL-SCNC: 145 MMOL/L (ref 136–145)
WBC # BLD AUTO: 4.53 THOUSAND/UL (ref 4.31–10.16)

## 2019-01-25 PROCEDURE — 99222 1ST HOSP IP/OBS MODERATE 55: CPT | Performed by: INTERNAL MEDICINE

## 2019-01-25 PROCEDURE — 97163 PT EVAL HIGH COMPLEX 45 MIN: CPT

## 2019-01-25 PROCEDURE — G8979 MOBILITY GOAL STATUS: HCPCS

## 2019-01-25 PROCEDURE — 85025 COMPLETE CBC W/AUTO DIFF WBC: CPT | Performed by: INTERNAL MEDICINE

## 2019-01-25 PROCEDURE — 97110 THERAPEUTIC EXERCISES: CPT

## 2019-01-25 PROCEDURE — 87631 RESP VIRUS 3-5 TARGETS: CPT | Performed by: INTERNAL MEDICINE

## 2019-01-25 PROCEDURE — G8978 MOBILITY CURRENT STATUS: HCPCS

## 2019-01-25 PROCEDURE — 84145 PROCALCITONIN (PCT): CPT | Performed by: GENERAL PRACTICE

## 2019-01-25 PROCEDURE — 97116 GAIT TRAINING THERAPY: CPT

## 2019-01-25 PROCEDURE — 80048 BASIC METABOLIC PNL TOTAL CA: CPT | Performed by: INTERNAL MEDICINE

## 2019-01-25 RX ORDER — LEVOTHYROXINE SODIUM 0.1 MG/1
100 TABLET ORAL
Status: DISCONTINUED | OUTPATIENT
Start: 2019-01-25 | End: 2019-01-26 | Stop reason: HOSPADM

## 2019-01-25 RX ORDER — METRONIDAZOLE 500 MG/1
500 TABLET ORAL EVERY 8 HOURS SCHEDULED
Status: DISCONTINUED | OUTPATIENT
Start: 2019-01-25 | End: 2019-01-25

## 2019-01-25 RX ORDER — METOPROLOL TARTRATE 50 MG/1
50 TABLET, FILM COATED ORAL EVERY 12 HOURS SCHEDULED
Status: DISCONTINUED | OUTPATIENT
Start: 2019-01-25 | End: 2019-01-26 | Stop reason: HOSPADM

## 2019-01-25 RX ORDER — ACETAMINOPHEN 325 MG/1
650 TABLET ORAL EVERY 6 HOURS PRN
Status: DISCONTINUED | OUTPATIENT
Start: 2019-01-25 | End: 2019-01-26 | Stop reason: HOSPADM

## 2019-01-25 RX ORDER — DIVALPROEX SODIUM 250 MG/1
250 TABLET, EXTENDED RELEASE ORAL 2 TIMES DAILY
Status: DISCONTINUED | OUTPATIENT
Start: 2019-01-25 | End: 2019-01-26 | Stop reason: HOSPADM

## 2019-01-25 RX ORDER — WARFARIN SODIUM 3 MG/1
3 TABLET ORAL
Status: DISCONTINUED | OUTPATIENT
Start: 2019-01-25 | End: 2019-01-26 | Stop reason: HOSPADM

## 2019-01-25 RX ORDER — FLUTICASONE FUROATE AND VILANTEROL 100; 25 UG/1; UG/1
1 POWDER RESPIRATORY (INHALATION) DAILY
Status: DISCONTINUED | OUTPATIENT
Start: 2019-01-25 | End: 2019-01-26 | Stop reason: HOSPADM

## 2019-01-25 RX ORDER — CARBAMAZEPINE 100 MG/1
100 TABLET, CHEWABLE ORAL 3 TIMES DAILY
Status: DISCONTINUED | OUTPATIENT
Start: 2019-01-25 | End: 2019-01-26 | Stop reason: HOSPADM

## 2019-01-25 RX ORDER — FUROSEMIDE 40 MG/1
40 TABLET ORAL DAILY
Status: DISCONTINUED | OUTPATIENT
Start: 2019-01-25 | End: 2019-01-26 | Stop reason: HOSPADM

## 2019-01-25 RX ORDER — FERROUS SULFATE 325(65) MG
325 TABLET ORAL 2 TIMES DAILY
Status: DISCONTINUED | OUTPATIENT
Start: 2019-01-25 | End: 2019-01-26 | Stop reason: HOSPADM

## 2019-01-25 RX ORDER — ALBUTEROL SULFATE 2.5 MG/3ML
2.5 SOLUTION RESPIRATORY (INHALATION) EVERY 6 HOURS PRN
Status: DISCONTINUED | OUTPATIENT
Start: 2019-01-25 | End: 2019-01-26 | Stop reason: HOSPADM

## 2019-01-25 RX ORDER — BUPROPION HYDROCHLORIDE 150 MG/1
150 TABLET, EXTENDED RELEASE ORAL DAILY
Status: DISCONTINUED | OUTPATIENT
Start: 2019-01-25 | End: 2019-01-26 | Stop reason: HOSPADM

## 2019-01-25 RX ORDER — GUAIFENESIN 100 MG/5ML
200 SOLUTION ORAL EVERY 4 HOURS PRN
Status: DISCONTINUED | OUTPATIENT
Start: 2019-01-25 | End: 2019-01-26 | Stop reason: HOSPADM

## 2019-01-25 RX ORDER — ATORVASTATIN CALCIUM 10 MG/1
10 TABLET, FILM COATED ORAL DAILY
Status: DISCONTINUED | OUTPATIENT
Start: 2019-01-25 | End: 2019-01-26 | Stop reason: HOSPADM

## 2019-01-25 RX ORDER — CLOPIDOGREL BISULFATE 75 MG/1
75 TABLET ORAL DAILY
Status: DISCONTINUED | OUTPATIENT
Start: 2019-01-25 | End: 2019-01-26 | Stop reason: HOSPADM

## 2019-01-25 RX ORDER — DILTIAZEM HYDROCHLORIDE 90 MG/1
180 CAPSULE, EXTENDED RELEASE ORAL EVERY 12 HOURS SCHEDULED
Status: DISCONTINUED | OUTPATIENT
Start: 2019-01-25 | End: 2019-01-26 | Stop reason: HOSPADM

## 2019-01-25 RX ADMIN — FUROSEMIDE 40 MG: 40 TABLET ORAL at 08:30

## 2019-01-25 RX ADMIN — DIVALPROEX SODIUM 250 MG: 250 TABLET, FILM COATED, EXTENDED RELEASE ORAL at 08:30

## 2019-01-25 RX ADMIN — METRONIDAZOLE 500 MG: 500 INJECTION, SOLUTION INTRAVENOUS at 01:12

## 2019-01-25 RX ADMIN — CARBAMAZEPINE 100 MG: 100 TABLET, CHEWABLE ORAL at 18:49

## 2019-01-25 RX ADMIN — LEVOTHYROXINE SODIUM 100 MCG: 100 TABLET ORAL at 06:09

## 2019-01-25 RX ADMIN — VANCOMYCIN HYDROCHLORIDE 1000 MG: 1 INJECTION, SOLUTION INTRAVENOUS at 02:05

## 2019-01-25 RX ADMIN — METOPROLOL TARTRATE 50 MG: 50 TABLET, FILM COATED ORAL at 22:36

## 2019-01-25 RX ADMIN — METOPROLOL TARTRATE 50 MG: 50 TABLET, FILM COATED ORAL at 08:30

## 2019-01-25 RX ADMIN — METRONIDAZOLE 500 MG: 500 TABLET ORAL at 13:19

## 2019-01-25 RX ADMIN — CARBAMAZEPINE 100 MG: 100 TABLET, CHEWABLE ORAL at 08:30

## 2019-01-25 RX ADMIN — WARFARIN SODIUM 3 MG: 3 TABLET ORAL at 18:49

## 2019-01-25 RX ADMIN — CARBAMAZEPINE 100 MG: 100 TABLET, CHEWABLE ORAL at 22:33

## 2019-01-25 RX ADMIN — CEFEPIME 2000 MG: 2 INJECTION, POWDER, FOR SOLUTION INTRAVENOUS at 13:20

## 2019-01-25 RX ADMIN — DILTIAZEM HYDROCHLORIDE 180 MG: 90 CAPSULE, EXTENDED RELEASE ORAL at 08:36

## 2019-01-25 RX ADMIN — DIVALPROEX SODIUM 250 MG: 250 TABLET, FILM COATED, EXTENDED RELEASE ORAL at 18:49

## 2019-01-25 RX ADMIN — CLOPIDOGREL BISULFATE 75 MG: 75 TABLET ORAL at 08:30

## 2019-01-25 RX ADMIN — FLUTICASONE FUROATE AND VILANTEROL TRIFENATATE 1 PUFF: 100; 25 POWDER RESPIRATORY (INHALATION) at 08:32

## 2019-01-25 RX ADMIN — BUPROPION HYDROCHLORIDE 150 MG: 150 TABLET, FILM COATED, EXTENDED RELEASE ORAL at 08:30

## 2019-01-25 RX ADMIN — ATORVASTATIN CALCIUM 10 MG: 10 TABLET, FILM COATED ORAL at 18:49

## 2019-01-25 RX ADMIN — FERROUS SULFATE TAB 325 MG (65 MG ELEMENTAL FE) 325 MG: 325 (65 FE) TAB at 08:30

## 2019-01-25 RX ADMIN — DILTIAZEM HYDROCHLORIDE 180 MG: 90 CAPSULE, EXTENDED RELEASE ORAL at 22:36

## 2019-01-25 RX ADMIN — FERROUS SULFATE TAB 325 MG (65 MG ELEMENTAL FE) 325 MG: 325 (65 FE) TAB at 18:49

## 2019-01-25 RX ADMIN — CEFEPIME 2000 MG: 2 INJECTION, POWDER, FOR SOLUTION INTRAVENOUS at 03:05

## 2019-01-25 RX ADMIN — METRONIDAZOLE 500 MG: 500 TABLET ORAL at 08:39

## 2019-01-25 NOTE — PHYSICAL THERAPY NOTE
Physical Therapy Evaluation     Patient's Name: Zbigniew Saini    Admitting Diagnosis  Fever [R50 9]  Acute encephalopathy [G93 40]  HCAP (healthcare-associated pneumonia) [J18 9]    Problem List  Patient Active Problem List   Diagnosis    HTN (hypertension)    COPD (chronic obstructive pulmonary disease) ([facility] )    Chronic diastolic congestive heart failure (HCC)    Atrial flutter (HCC)    Back pain    Dementia    Hyperlipidemia    Urinary tract infection associated with catheterization of urinary tract ([place] [address] )    Acute encephalopathy    RSV infection    Ambulatory dysfunction    Hypernatremia    Stage 3 chronic kidney disease ([place] [address] )    Lower extremity edema    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    JESENIA (obstructive sleep apnea)    Intermittent claudication of right lower extremity due to atherosclerosis (HCC)    Atrial fibrillation (HCC)    Chronic indwelling Davis catheter    Bradycardia    Essential hypertension    [facility]    Macrocytic anemia    Aortoiliac occlusive disease ([place] [address] )    HCAP (healthcare-associated pneumonia)       Past Medical History  Past Medical History:   Diagnosis Date    Atrial fibrillation ([place] [address] )     Brain aneurysm     CAD (coronary artery disease)     Cardiac disease     [facility] st[facility] 12 years ago in [facility]    CHF (congestive heart failure) ([facility] )     CKD (chronic kidney disease)     COPD (chronic obstructive pulmonary disease) ([place] [address] )     Dementia     Dementia     Disease of thyroid gland     GERD (gastroesophageal reflux disease)     Hyperlipidemia     Hyperlipidemia     Hypertension     MI (myocardial infarction) ([place] [address] )     Migraine     Renal disorder     Seizures ([place] [address] )     Stroke [facility])        Past Surgical History  Past Surgical History:   Procedure Laterality Date    APPENDECTOMY      ARTERIOGRAM N/A 12/7/2018    Procedure: ARTERIOGRAM WITH STENT PLACEMENT;  Surgeon: Brannon Reddy MD;  Location:  MAIN OR;  Service: Vascular    BLADDER TUMOR EXCISION      BRAIN SURGERY      1998  clip right frontal lobe    CARDIAC SURGERY      COLONOSCOPY      CORONARY STENT PLACEMENT      5 stents    EYE SURGERY      IR ABDOMINAL ANGIOGRAPHY / INTERVENTION  12/7/2018    MANDIBLE FRACTURE SURGERY      TONSILLECTOMY      TUBAL LIGATION      UTERINE FIBROID SURGERY          01/25/19 1548   Note Type   Note type Eval/Treat   Pain Assessment   Pain Assessment No/denies pain   Pain Score No Pain   Home Living   Type of Home Assisted living  [facility]   Home Layout Two level  (bedroom on 2nd floor c 10 steps to access)   [address] [facility]   Additional Comments ambulatory with walker at baseline, pt unsure of which type   Prior Function   Level of [place] Needs assistance with ADLs and functional mobility; Needs assistance with IADLs   Lives With Facility staff   Receives Help From (staff at [place])   ADL Assistance Needs assistance   IADLs Needs assistance   Falls in the last 6 months ("I fall alot")   Restrictions/Precautions   Weight Bearing Precautions Per Order No   Braces or Orthoses (none per pt)   Other Precautions Droplet precautions;Multiple lines; Fall Risk;Cognitive; Chair Alarm; Bed Alarm   General   Family/Caregiver Present No   Cognition   Overall Cognitive Status Impaired   Arousal/Participation Alert   Orientation Level Oriented to person;Oriented to place; Disoriented to time;Disoriented to situation   Memory Decreased short term memory;Decreased recall of recent events;Decreased recall of precautions   Following Commands Follows one step commands without difficulty   Comments pt agreeable to PT evaluation   RUE Assessment   RUE Assessment WFL  (AROM WFL, grossly 4-/5)   LUE Assessment   LUE Assessment WFL  (AROM WFL, grossly 4-/5)   RLE Assessment   RLE Assessment WFL  (AROM WFL, grossly 4-/5)   LLE Assessment   LLE Assessment WFL  (AROM WFL, grossly 4-/5) Coordination   Movements are Fluid and Coordinated 1   Sensation WFL  (pt denies any numbness/tingling)   Light Touch   RLE Light Touch Grossly intact   LLE Light Touch Grossly intact   Bed Mobility   Additional Comments pt received OOB in recliner upon arrival   Transfers   Sit to Stand 5  Supervision   Additional items Assist x 1; Armrests; Increased time required;Verbal cues   Stand to Sit 5  Supervision   Additional items Assist x 1; Armrests; Increased time required;Verbal cues   Ambulation/Elevation   Gait pattern Decreased foot clearance; Short stride; Step to   Gait Assistance 5  Supervision   Additional items Assist x 1;Verbal cues   Assistive Device Rolling walker   Distance 20'   Stair Management Assistance Not tested   Balance   Static Sitting Good   Dynamic Sitting Fair +   Static Standing Fair   Dynamic Standing Fair -   Ambulatory Fair -   Endurance Deficit   Endurance Deficit Yes   Activity Tolerance   Activity Tolerance Patient limited by fatigue   Nurse Made Aware [place] verbalized pt appropriate to see, made aware of session outcome/recs   Assessment   Prognosis Good   Problem List Decreased strength;Decreased endurance; Impaired balance;Decreased mobility; Decreased safety awareness   Assessment Pt is 68 y o  female seen for PT evaluation on 1/25/2019 s/p admit to [facility] on 1/24/2019 w/ HCAP (healthcare-associated pneumonia)  PT consulted to assess pt's functional mobility and d/c needs  Order placed for PT eval and tx, w/ up w/ A order  Performed at least 2 patient identifiers during session: Name and wristband  Comorbidities affecting pt's physical performance at time of assessment include: A fib, brain aneurysm, CAD, cardiac disease, CHF, CKD, COPD, dementia, disease of thyroid gland, GERD, HLD, HTN, MI, migraine, renal disorder, seizures, stroke   Pt with cognitive impairments, PLOF and home environment obtained from previous PT eval  PTA, pt was independent w/ all functional mobility w/ walker, ambulates community distances and elevations and resident of [facility]  Personal factors affecting pt at time of IE include: ambulating w/ assistive device, decreased initiation and engagement and limited insight into impairments  Please find objective findings from PT assessment regarding body systems outlined above with impairments and limitations including weakness, impaired balance, decreased endurance, gait deviations, decreased activity tolerance, decreased safety awareness, fall risk and decreased cognition, as well as mobility assessment (need for SBA assist w/ all phases of mobility when usually ambulating independently and need for cueing for mobility technique)  The following objective measures performed on IE also reveal limitations: Barthel Index: 50/100 and Modified Ginny: 3 (moderate disability)  Pt's clinical presentation is currently unstable/unpredictable seen in pt's presentation of need for input for task focus and mobility technique and ongoing medical assessment  Pt to benefit from continued PT tx to address deficits as defined above and maximize level of functional independent mobility and consistency  From PT/mobility standpoint, recommendation at time of d/c would be return to [facility] with HHPT pending progress in order to facilitate return to PLOF  Pt to clear stairs prior to safe d/c disposition to previous living environment     Barriers to Discharge Inaccessible home environment;Decreased caregiver support   Goals   Patient Goals to return to [facility] Expiration Date 02/04/19   Short Term Goal #1 In 7-10 days: Increase bilateral LE strength 1/2 grade to facilitate independent mobility, Perform all bed mobility tasks modified independent to decrease caregiver burden, Perform all transfers modified independent to improve independence, Ambulate > 150 ft  with RW modified independent w/o LOB and w/ normalized gait pattern 100% of the time, Navigate 10 stairs modified independent with unilateral handrail to facilitate return to previous living environment, Increase all balance 1/2 grade to decrease risk for falls, Complete exercise program independently, Tolerate 4 hr OOB to faciliate upright tolerance, Improve Barthel Index score to 65 or greater to facilitate independence and PT provider will perform functional balance assessment to determine fall risk   Treatment Day 0   Plan   Treatment/Interventions Functional transfer training;LE strengthening/ROM; Therapeutic exercise; Endurance training;Patient/family training;Equipment eval/education; Bed mobility;Gait training;Elevations; Spoke to nursing   PT Frequency (3-5x/wk)   Recommendation   Recommendation Home PT  (anticipated return to [facility] with HHPT)   Equipment Recommended Walker  (RW)   PT - OK to Discharge No   Modified Ginny Scale   Modified Ginny Scale 3   Barthel Index   Feeding 10   Bathing 0   Grooming Score 0   Dressing Score 5   Bladder Score 10   Bowels Score 10   Toilet Use Score 5   Transfers (Bed/Chair) Score 10   Mobility (Level Surface) Score 0   Stairs Score 0   Barthel Index Score 50         Roselia Almeida, PT, DPT    Physical Therapy Treatment Note  Time In: 1556  Time Out: 1620  Total Time: 24 min     S:  Pt agreeable to PT treatment session s/p PT eval, in no apparent distress and responsive      O:  Pt seen for PT treatment session this date with interventions consisting of gait training w/ emphasis on improving pt's ability to ambulate level surfaces x 100' with SBA provided by therapist with RW and Therapeutic exercise consisting of: AROM 10 reps B LE in sitting position  No pain reported throughout exercise reps  VC required for technique      A:  Exercises initiated include: seated LAQ, marches, heel slides, hip abduction/adduction to midline x10 reps  Pt able to demonstrate 100' distance, vc for RW management and posture with walker for safety  SpO2 on RA following gait trial : 95%   Pt denies any SOB/GRAF, lightheadedness, dizziness  Post session: pt returned back to recliner, all needs in reach and RN notified of session findings/recommendations      P:  Continue to recommend anticipated return to skilled nursing with HHPT at time of d/c in order to maximize pt's functional independence and safety w/ mobility  Pt continues to be functioning below baseline level, and remains limited 2* factors listed above  PT will continue to see pt while here in order to address the deficits listed above and provide interventions consistent w/ POC in effort to achieve STGs      Ocie Meter, PT, DPT

## 2019-01-25 NOTE — ASSESSMENT & PLAN NOTE
Continue home carbamazepine 100 mg t i d , Depakote 250 mg b i daily     She is also on bupropion 150 mg q 12 daily which I will continue

## 2019-01-25 NOTE — PLAN OF CARE
Problem: PHYSICAL THERAPY ADULT  Goal: Performs mobility at highest level of function for planned discharge setting  See evaluation for individualized goals  Treatment/Interventions: Functional transfer training, LE strengthening/ROM, Therapeutic exercise, Endurance training, Patient/family training, Equipment eval/education, Bed mobility, Gait training, Elevations, Spoke to nursing  Equipment Recommended: Christianne Jacobs (KANDACE)       See flowsheet documentation for full assessment, interventions and recommendations  Prognosis: Good  Problem List: Decreased strength, Decreased endurance, Impaired balance, Decreased mobility, Decreased safety awareness  Assessment: Pt is 68 y o  female seen for PT evaluation on 1/25/2019 s/p admit to [facility] on 1/24/2019 w/ HCAP (healthcare-associated pneumonia)  PT consulted to assess pt's functional mobility and d/c needs  Order placed for PT eval and tx, w/ up w/ A order  Performed at least 2 patient identifiers during session: Name and wristband  Comorbidities affecting pt's physical performance at time of assessment include: A fib, brain aneurysm, CAD, cardiac disease, CHF, CKD, COPD, dementia, disease of thyroid gland, GERD, HLD, HTN, MI, migraine, renal disorder, seizures, stroke  Pt with cognitive impairments, PLOF and home environment obtained from previous PT eval  PTA, pt was independent w/ all functional mobility w/ walker, ambulates community distances and elevations and resident of [facility]  Personal factors affecting pt at time of IE include: ambulating w/ assistive device, decreased initiation and engagement and limited insight into impairments   Please find objective findings from PT assessment regarding body systems outlined above with impairments and limitations including weakness, impaired balance, decreased endurance, gait deviations, decreased activity tolerance, decreased safety awareness, fall risk and decreased cognition, as well as mobility assessment (need for SBA assist w/ all phases of mobility when usually ambulating independently and need for cueing for mobility technique)  The following objective measures performed on IE also reveal limitations: Barthel Index: 50/100 and Modified [place]: 3 (moderate disability)  Pt's clinical presentation is currently unstable/unpredictable seen in pt's presentation of need for input for task focus and mobility technique and ongoing medical assessment  Pt to benefit from continued PT tx to address deficits as defined above and maximize level of functional independent mobility and consistency  From PT/mobility standpoint, recommendation at time of d/c would be return to [facility] with HHPT pending progress in order to facilitate return to PLOF  Pt to clear stairs prior to safe d/c disposition to previous living environment  Barriers to Discharge: Inaccessible home environment, Decreased caregiver support     Recommendation: Home PT (anticipated return to [facility] with HHPT)     PT - OK to Discharge: No    See flowsheet documentation for full assessment

## 2019-01-25 NOTE — ASSESSMENT & PLAN NOTE
Patient reported to have home fevers and confusion which is worse beyond her dementia, also presenting with cough and found to have right-sided pneumonia  Recent admission for UTI at the beginning of January which would qualify this at the healthcare associated pneumonia  Vancomycin, cefepime and Flagyl ordered  Mucolytic ordered for congestion  Continue home oxygen  Flu panel ordered  Holding IV fluids given diastolic heart dysfunction history  Continue Lasix 40 mg daily as patient appears to be euvolemic  Monitor for clinical improvement over the next 24-48 hours

## 2019-01-25 NOTE — CONSULTS
Consultation - Infectious Disease   Zbigniew Parveen 68 y o  female MRN: [id number]  Unit/Bed#: -01 Encounter: [id number]      IMPRESSION & RECOMMENDATIONS:   1  Fevers:  Patient was brought into the emergency department with reported fevers along with coughing  Imaging was unremarkable  Flu testing was negative  Blood cultures are pending  The patient seems to be at her baseline from when I last saw her earlier this month  Fortunately she remains afebrile and hemodynamically stable   -given the patient's stability would hold further antibiotics  -have ordered respiratory viral panel as I suspect a viral URI  -continue to monitor fever curve/vitals  -continue to trend CBC and chemistry  -will follow up pending cultures  -ongoing supportive care as per primary  -additional interventions pending patient's clinical course    2  Abnormal urinalysis:  Patient's urinalysis noted to be abnormal on admission  Given her stability I suspect that this was largely due to chronic indwelling Davis catheter   -continue to monitor Davis output  -continue to trend fever curve/vitals  -continue monitor CBC and chemistry  -follow up any pending cultures     3  Chronic kidney disease:  Patient is noted to have chronic kidney disease at baseline  Her creatinine appears stable on repeat labs today   -continue to monitor chemistry while inpatient  -additional supportive care as per primary    4  Neurogenic bladder with chronic Davis catheter:  Patient has baseline neurogenic bladder and chronic Davis catheter  Continue supportive care as per nursing and primary  5  Penicillin allergy:  Given patient's baseline dementia she is unable to give me a clear breakdown of her reaction to penicillin  She is only able to tell me that it happened as a child   -holding antibiotics as above  -additional supportive care as per primary     6  Baseline dementia:  Patient on last admission was noted to have underlying dementia    She is currently at her baseline from my previous evaluation  She is oriented to self but she has both short-term and long-term memory loss  Evaluations she is pleasantly confused  -continue to monitor mental status  -additional supportive care as per primary    7  Elevated BNP and history of heart failure:  Patient presented acutely with some upper respiratory symptoms and cough  BMP is noted to be elevated from her baseline for prior values  Would question if this was contributing to her presentation with fevers and cough  -ongoing supportive care as per primary    Above plan discussed in detail with the patient, though again unclear how much she retain  Above also discussed in detail with primary service  ID consult service will formally re-evaluate the patient again on Monday  Please call over the weekend if any additional questions or concerns  HISTORY OF PRESENT ILLNESS:  Reason for Consult:  HCAP and acute encephalopathy    HPI: Evert Bryson is a 68y o  year old female with past medical history significant for chronic kidney disease, coronary artery disease, dementia, COPD and hyperlipidemia  She was recently hospitalized for urinary tract infection in the beginning of January  She presented to the emergency department yesterday with fevers prior to admission  She had been having coughing more than usual   She reports multiple people who had URI symptoms around her on initial evaluation  She was noted to only be alert and oriented x1 and it was unclear what her baseline mental status is  UA was noted with only 4-10 white cells  Lactate was normal   Creatinine was 1 4  Normal white blood cell count  Patient was admitted for healthcare associated pneumonia and started on vancomycin, cefepime and Flagyl  Additional tests were ordered such as flu studies and cultures  No other acute events were noted overnight  Patient is noted to be afebrile here  White blood cell count of 4 today    Flu swab was negative and blood cultures are pending  Chest x-ray is negative  Patient's other vitals are stable  Procalcitonin is ordered and pending  We are consulted at this time given the patient's presentation of fevers with encephalopathy and multiple drug allergies  On evaluation, the patient is pleasantly demented  She does not know why she would came to the hospital what symptoms she has had  She can't recall if she had lunch  At the moment she denies having any nausea, vomiting, chest pain, shortness of breath, abdominal pain, rash, diarrhea, changes in vision, sore throat or back pain  She reports that her Davis catheter remains in place and is comfortable  REVIEW OF SYSTEMS:  Unable to obtain full review of systems as the patient has baseline dementia      PAST MEDICAL HISTORY:  Past Medical History:   Diagnosis Date    Atrial fibrillation ([facility] )     Brain aneurysm     CAD (coronary artery disease)     Cardiac disease     had stents 12 years ago in [address] [place] [address] [facility]est[facility]     CKD (chronic kidney disease)     COPD [facility]     Dementia     Dementia     Disease of thyroid gland     GERD (gastroesophageal reflux disease)     Hyperlipidemia     Hyperlipidemia     Hypertension     MI (myocardial infarction) ([facility] )     Migraine     Renal disorder     Seizures ([facility] )     Stroke [facility])      Past Surgical History:   Procedure Laterality Date    APPENDECTOMY      ARTERIOGRAM N/A 12/7/2018    Procedure: ARTERIOGRAM WITH STENT PLACEMENT;  Surgeon: Maria Luz Reddy MD;  [facility]: [facility];  Service: Vascular    BLADDER TUMOR EXCISION      BRAIN SURGERY      1998  clip right frontal lobe    CARDIAC SURGERY      COLONOSCOPY      CORONARY STENT PLACEMENT      5 stents    EYE SURGERY      IR ABDOMINAL ANGIOGRAPHY / INTERVENTION  12/7/2018    MANDIBLE FRACTURE SURGERY      TONSILLECTOMY      TUBAL LIGATION      UTERINE FIBROID SURGERY         FAMILY HISTORY:  Non-contributory    SOCIAL HISTORY:  Social History   History   Alcohol Use No     History   Drug Use No     History   Smoking Status    Former Smoker    Packs/day: 5 00    Years: 40 00    Quit date: 2007   Smokeless Tobacco    Never Used       ALLERGIES:  Allergies   Allergen Reactions    Spiriva Handihaler [Tiotropium Bromide Monohydrate] Swelling    Ramipril Facial Swelling    Penicillins      unsure       MEDICATIONS:  All current active medications have been reviewed  PHYSICAL EXAM:  Temp:  [98 6 °F (37 °C)-99 2 °F (37 3 °C)] 98 6 °F (37 °C)  HR:  [56-72] 64  Resp:  [18] 18  BP: (127-170)/(77-82) 146/80  SpO2:  [95 %-98 %] 95 %  Temp (24hrs), Av 9 °F (37 2 °C), Min:98 6 °F (37 °C), Max:99 2 °F (37 3 °C)  Current: Temperature: 98 6 °F (37 °C)    Intake/Output Summary (Last 24 hours) at 19 1516  Last data filed at 19 1030   Gross per 24 hour   Intake                0 ml   Output              650 ml   Net             -650 ml       General Appearance:  Appearing well, nontoxic, and in no distress; pleasantly demented   Head:  Normocephalic, without obvious abnormality, atraumatic   Eyes:  Conjunctiva pink and sclera anicteric, both eyes   Nose: Nares normal, mucosa normal, no drainage   Throat: Oropharynx moist without lesions   Neck: Supple, symmetrical, no adenopathy, no tenderness/mass/nodules   Back:   Symmetric, no curvature, ROM normal, no CVA tenderness; no spinal or paraspinal muscle tenderness to palpation   Lungs:   Crackles noted in the bases bilaterally, respirations unlabored on room air   Chest Wall:  No tenderness or deformity   Heart:  RRR; no rub or gallop; systolic murmur present   Abdomen:   Soft, non-tender, non-distended, positive bowel sounds    Extremities: No cyanosis, clubbing or edema   Skin: No rashes or lesions on exposed skin  No draining wounds noted on exposed skin     Lymph nodes: Cervical, supraclavicular nodes normal   Neurologic: Alert and oriented to self only, extremity strength 5/5 and symmetric; no cranial nerve deficits noted on exam        LABS, IMAGING, & OTHER STUDIES:  Lab Results:  I have personally reviewed pertinent labs  Results from last 7 days  Lab Units 01/25/19  0519 01/24/19  2202   WBC Thousand/uL 4 53 5 63   HEMOGLOBIN g/dL 10 0* 11 0*   PLATELETS Thousands/uL 124* 154       Results from last 7 days  Lab Units 01/25/19  0519 01/24/19  2202   POTASSIUM mmol/L 3 8 4 0   CHLORIDE mmol/L 108 107   CO2 mmol/L 27 29   BUN mg/dL 26* 30*   CREATININE mg/dL 1 34* 1 42*   EGFR ml/min/1 73sq m 40 37   CALCIUM mg/dL 9 0 9 4   AST U/L  --  25   ALT U/L  --  23   ALK PHOS U/L  --  64       Results from last 7 days  Lab Units 01/25/19  0520   INFLUENZA B PCR  None Detected   RSV PCR  None Detected       Imaging Studies:   I have personally reviewed pertinent imaging study reports and images in PACS  Other Studies:   I have personally reviewed pertinent reports

## 2019-01-25 NOTE — PROGRESS NOTES
The metronidazole has / have been converted to Oral per [facility] [place]note 2] IV-to-PO Auto-Conversion Protocol for Adults as approved by the Pharmacy and Therapeutics Committee  The patient met all eligible criteria:  3 Age = 25years old   2) Received at least one dose of the IV form   3) Receiving at least one other scheduled oral/enteral medication   4) Tolerating an oral/enteral diet   and did not have any exclusions:   1) Critical care patient   2) Active GI bleed (IF assessing H2RAs or PPIs)   3) Continuous tube feeding (IF assessing cipro, doxycycline, levofloxacin, minocycline, rifampin, or voriconazole)   4) Receiving PO vancomycin (IF assessing metronidazole)   5) Persistent nausea and/or vomiting   6) Ileus or gastrointestinal obstruction   7) Duglas/nasogastric tube set for continuous suction   8) Specific order not to automatically convert to PO (in the order's comments or if discussed in the most recent Infectious Disease or primary team's progress notes)

## 2019-01-25 NOTE — ED PROVIDER NOTES
History  Chief Complaint   Patient presents with    Fever - 9 weeks to 74 years     pt arrives by EMS from [facility] for new onset fever, shaking and right bottom lip swelling      This is a 67 y/o here for altered mental status  Began today  Per daughter she has been acting confused  Complained of lower abdominal pain earlier which resolved  Decreased appetite  Then about 2 hours ago had right lower lip swelling  Decreased after being given tylenol  Had temperature of 99 8  Personal care home called EMS after given pt tylenol  Swelling decreased after tylenol but by time EMS arrived the swelling returned  Now, upon arrival to ED, the swelling has resolved again  Pt offers no complaints  She is resting comfortably on her home O2  History provided by:  Patient   used: No    Altered Mental Status   Presenting symptoms: confusion    Severity:  Mild  Most recent episode: Today  Episode history:  Continuous  Duration:  12 hours  Timing:  Constant  Progression:  Unchanged  Chronicity:  New  Context: dementia, recent change in medication, recent illness and recent infection    Associated symptoms: abdominal pain, decreased appetite and fever (99 8)    Associated symptoms: normal movement, no agitation, no bladder incontinence, no depression, no difficulty breathing, no eye deviation, no hallucinations, no headaches, no light-headedness, no nausea, no palpitations, no rash, no seizures, no slurred speech, no suicidal behavior, no visual change, no vomiting and no weakness        Prior to Admission Medications   Prescriptions Last Dose Informant Patient Reported? Taking?    Cholecalciferol (VITAMIN D) 2000 units CAPS  Outside Facility (Specify) No No   Sig: Take 1 capsule (2,000 Units total) by mouth daily   Citric Vp-Zoqkzihfixb-Tn Carb (RENACIDIN) SOLN  Outside Facility (Specify) Yes No   Sig: Indications: 60ml to irrigate baca once a week on Friday   Sennosides-Docusate Sodium (SENNA PLUS PO)  Outside Facility (Specify) Yes No   Sig: Take by mouth daily as needed   acetaminophen (TYLENOL) 500 mg tablet  Outside Facility (Specify) Yes No   Sig: Take 500 mg by mouth every 6 (six) hours as needed for mild pain   albuterol (2 5 mg/3 mL) 0 083 % nebulizer solution  Outside Facility (Specify) No No   Sig: Take 1 vial (2 5 mg total) by nebulization every 6 (six) hours as needed for wheezing   albuterol (PROVENTIL HFA,VENTOLIN HFA) 90 mcg/act inhaler  Outside Facility (Specify) No No   Sig: Inhale 2 puffs every 6 (six) hours as needed for wheezing   allopurinol (ZYLOPRIM) 300 mg tablet  Outside Facility (Specify) No No   Sig: Take 1 tablet (300 mg total) by mouth every morning for 14 days   atorvastatin (LIPITOR) 10 mg tablet  Outside Facility (Specify) No No   Sig: Take 1 tablet (10 mg total) by mouth daily   betamethasone dipropionate (DIPROSONE) 0 05 % cream  Outside Facility (Specify) Yes No   Sig: Apply topically 3 (three) times a day   buPROPion (WELLBUTRIN SR) 150 mg 12 hr tablet  Outside Facility (Specify) No No   Sig: Take 1 tablet (150 mg total) by mouth daily   carBAMazepine (TEGretol) 100 mg chewable tablet  Outside Facility (Specify) No No   Sig: Chew 1 tablet (100 mg total) 3 (three) times a day   clopidogrel (PLAVIX) 75 mg tablet  Outside Facility (Specify) No No   Sig: Take 1 tablet (75 mg total) by mouth daily   clotrimazole (LOTRIMIN) 1 % cream  Outside Facility (Specify) Yes No   Sig: Apply topically 3 (three) times a day   divalproex sodium (DEPAKOTE ER) 250 mg 24 hr tablet   No No   Sig: Take 1 tablet (250 mg total) by mouth 2 (two) times a day   fenofibrate micronized (LOFIBRA) 67 MG capsule  Outside Facility (Specify) No No   Sig: Take 1 capsule (67 mg total) by mouth daily with breakfast   ferrous sulfate 325 (65 Fe) mg tablet  Outside Facility (Specify) No No   Sig: Take 1 tablet (325 mg total) by mouth 2 (two) times a day   fluticasone-vilanterol (BREO ELLIPTA) 100-25 mcg/inh inhaler  Outside Facility (Specify) No No   Sig: Inhale 1 puff daily   furosemide (LASIX) 40 mg tablet  Outside Facility (Specify) No No   Sig: Take 1 tablet (40 mg total) by mouth daily   levothyroxine 88 mcg tablet  Outside Facility (Specify) No No   Sig: Take 1 tablet (88 mcg total) by mouth daily   Patient not taking: Reported on 1/31/2019    metoprolol tartrate (LOPRESSOR) 50 mg tablet  Outside Facility (Specify) No No   Sig: Take 1 tablet (50 mg total) by mouth every 12 (twelve) hours   nitroglycerin (NITROSTAT) 0 4 mg SL tablet  Outside Facility (Specify) No No   Sig: Place 1 tablet (0 4 mg total) under the tongue every 5 (five) minutes as needed for chest pain   potassium chloride (K-DUR,KLOR-CON) 20 mEq tablet  Outside Facility (Specify) No No   Sig: Take 1 tablet (20 mEq total) by mouth daily   saccharomyces boulardii (FLORASTOR) 250 mg capsule   No No   Sig: Take 1 capsule (250 mg total) by mouth 2 (two) times a day   Patient not taking: Reported on 1/31/2019    warfarin (COUMADIN) 3 mg tablet  Outside Facility (Specify) No No   Sig: One tablet daily or as otherwise directed        Facility-Administered Medications: None       Past Medical History:   Diagnosis Date    Atrial fibrillation ([place] [address] )     Brain aneurysm     CAD (coronary artery disease)     Cardiac disease     had stents 12 years ago in [address] [place] [address] CH[facility]estive [facility] failure) [facility])     CKD (chronic kidney disease)     COPD [facility]chronic obstructive pulmonary disease) ([facility])     Dementia     Dementia     Disease of thyroid gland     GERD (gastroesophageal reflux disease)     Hyperlipidemia     Hyperlipidemia     Hypertension     MI [facility]ocardial infarction) ([facility] )     Migraine     Renal disorder     Seizures ([facility] )     Stroke [facility])        Past Surgical History:   Procedure Laterality Date    APPENDECTOMY      ARTERIOGRAM N/A 12/7/2018    Procedure: ARTERIOGRAM WITH STENT PLACEMENT;  Surgeon: Keven Reddy MD; [facility]: [facility];  Service: Vascular    BLADDER TUMOR EXCISION      BRAIN SURGERY      1998  clip right frontal lobe    CARDIAC SURGERY      COLONOSCOPY      CORONARY STENT PLACEMENT      5 stents    EYE SURGERY      IR ABDOMINAL ANGIOGRAPHY / INTERVENTION  12/7/2018    MANDIBLE FRACTURE SURGERY      TONSILLECTOMY      TUBAL LIGATION      UTERINE FIBROID SURGERY         Family History   Problem Relation Age of Onset    Heart disease Father     Parkinsonism Father     Macular degeneration Mother     Glaucoma Mother     Diabetes Brother     Heart disease Brother      I have reviewed and agree with the history as documented  Social History   Substance Use Topics    Smoking status: Former Smoker     Packs/day: 5 00     Years: 40 00     Quit date: 9/7/2007    Smokeless tobacco: Never Used    Alcohol use No        Review of Systems   Constitutional: Positive for decreased appetite and fever (99 8)  Negative for activity change, appetite change, chills, diaphoresis, fatigue and unexpected weight change  HENT: Negative for congestion, rhinorrhea, sinus pressure, sore throat and trouble swallowing  Eyes: Negative for photophobia and visual disturbance  Respiratory: Negative for apnea, cough, choking, chest tightness, shortness of breath, wheezing and stridor  Cardiovascular: Negative for chest pain, palpitations and leg swelling  Gastrointestinal: Positive for abdominal pain  Negative for abdominal distention, blood in stool, constipation, diarrhea, nausea and vomiting  Genitourinary: Negative for bladder incontinence, decreased urine volume, difficulty urinating, dysuria, enuresis, flank pain, frequency, hematuria and urgency  Musculoskeletal: Negative for arthralgias, myalgias, neck pain and neck stiffness  Skin: Negative for color change, pallor, rash and wound  Allergic/Immunologic: Negative      Neurological: Negative for dizziness, tremors, seizures, syncope, weakness, light-headedness, numbness and headaches  Hematological: Negative  Psychiatric/Behavioral: Positive for confusion  Negative for agitation and hallucinations  All other systems reviewed and are negative  Physical Exam  Physical Exam   Constitutional: She is oriented to person, place, and time  She appears well-developed and well-nourished  Non-toxic appearance  She does not have a sickly appearance  She does not appear ill  No distress  HENT:   Head: Normocephalic and atraumatic  Eyes: Pupils are equal, round, and reactive to light  EOM and lids are normal    Neck: Normal range of motion  Neck supple  Cardiovascular: Normal rate, regular rhythm, S1 normal, S2 normal, normal heart sounds, intact distal pulses and normal pulses  Exam reveals no gallop, no distant heart sounds, no friction rub and no decreased pulses  No murmur heard  Pulses:       Radial pulses are 2+ on the right side, and 2+ on the left side  Pulmonary/Chest: Effort normal and breath sounds normal  No accessory muscle usage  No apnea, no tachypnea and no bradypnea  No respiratory distress  She has no decreased breath sounds  She has no wheezes  She has no rhonchi  She has no rales  Abdominal: Soft  Normal appearance  She exhibits no distension  There is no tenderness  There is no rigidity, no rebound and no guarding  Davis catheter in place  Urine is clear  Musculoskeletal: Normal range of motion  She exhibits no edema, tenderness or deformity  Neurological: She is alert and oriented to person, place, and time  No cranial nerve deficit  GCS eye subscore is 4  GCS verbal subscore is 5  GCS motor subscore is 6  GCS 15  AAOx3  No focal neuro deficits  CN II-XII grossly intact  Speech normal, no aphasia or dysarthria  No pronator drift  Cerebellar function normal  Finger to nose normal  PERRL  EOMI  Peripheral vision intact  No nystagmus  Upper and lower extremity strength 5/5 through   strength 5/5 b/l   Kiran Hansen sensation intact b/l  Skin: Skin is warm, dry and intact  No rash noted  She is not diaphoretic  No erythema  No pallor  Psychiatric: Her speech is normal    Nursing note and vitals reviewed  Vital Signs  ED Triage Vitals [01/24/19 2109]   Temperature Pulse Respirations Blood Pressure SpO2   99 2 °F (37 3 °C) 72 18 170/77 97 %      Temp Source Heart Rate Source Patient Position - Orthostatic VS BP Location FiO2 (%)   Oral Monitor Lying Right arm --      Pain Score       No Pain           Vitals:    01/25/19 1608 01/25/19 2236 01/25/19 2317 01/26/19 0700   BP: 133/63 140/76 135/67 154/74   Pulse: 66 76 64 62   Patient Position - Orthostatic VS: Sitting  Lying Sitting       Visual Acuity      ED Medications  Medications   vancomycin (VANCOCIN) IVPB (premix) 1,000 mg (1,000 mg Intravenous New Bag 1/25/19 0205)       Diagnostic Studies  Results Reviewed     Procedure Component Value Units Date/Time    Blood culture #1 [[id number]] Collected:  01/24/19 2202    Lab Status:  Final result Specimen:  Blood from Arm, Right Updated:  01/30/19 0901     Blood Culture No Growth After 5 Days  Blood culture #2 [[id number]] Collected:  01/24/19 2220    Lab Status:  Final result Specimen:  Blood from Arm, Left Updated:  01/30/19 0901     Blood Culture No Growth After 5 Days      B-type natriuretic peptide [[id number]]  (Abnormal) Collected:  01/24/19 2202    Lab Status:  Final result Specimen:  Blood from Arm, Right Updated:  01/24/19 2354     NT-proBNP 1,236 (H) pg/mL     Urine Microscopic [[id number]]  (Abnormal) Collected:  01/24/19 2219    Lab Status:  Final result Specimen:  Urine from Urine, Indwelling Davis Catheter Updated:  01/24/19 2244     RBC, UA 4-10 (A) /hpf      WBC, UA 4-10 (A) /hpf      Epithelial Cells Occasional /hpf      Bacteria, UA Occasional /hpf     Hepatic function panel [[id number]]  (Abnormal) Collected:  01/24/19 2202    Lab Status:  Final result Specimen:  Blood from Arm, Right Updated:  01/24/19 2237     Total Bilirubin 0 30 mg/dL      Bilirubin, Direct 0 12 mg/dL      Alkaline Phosphatase 64 U/L      AST 25 U/L      ALT 23 U/L      Total Protein 7 3 g/dL      Albumin 3 4 (L) g/dL     UA w Reflex to Microscopic w Reflex to Culture [[id number]]  (Abnormal) Collected:  01/24/19 2219    Lab Status:  Final result Specimen:  Urine from Urine, Indwelling Davis Catheter Updated:  01/24/19 2232     Color, UA Yellow     Clarity, UA Clear     Specific Gravity, UA 1 015     pH, UA 6 5     Leukocytes, UA Small (A)     Nitrite, UA Negative     Protein, UA 30 (1+) (A) mg/dl      Glucose, UA Negative mg/dl      Ketones, UA Negative mg/dl      Urobilinogen, UA 0 2 E U /dl      Bilirubin, UA Negative     Blood, UA Moderate (A)    Troponin I [[id number]]  (Normal) Collected:  01/24/19 2202    Lab Status:  Final result Specimen:  Blood from Arm, Right Updated:  01/24/19 2227     Troponin I <0 02 ng/mL     Lactic acid, plasma [[id number]]  (Normal) Collected:  01/24/19 2202    Lab Status:  Final result Specimen:  Blood from Arm, Right Updated:  01/24/19 2227     LACTIC ACID 0 8 mmol/L     Narrative:         Result may be elevated if tourniquet was used during collection      Protime-INR [[id number]]  (Abnormal) Collected:  01/24/19 2202    Lab Status:  Final result Specimen:  Blood from Arm, Right Updated:  01/24/19 2227     Protime 19 1 (H) seconds      INR 1 63 (H)    APTT [[id number]]  (Normal) Collected:  01/24/19 2202    Lab Status:  Final result Specimen:  Blood from Arm, Right Updated:  01/24/19 2227     PTT 34 seconds     Basic metabolic panel [[id number]]  (Abnormal) Collected:  01/24/19 2202    Lab Status:  Final result Specimen:  Blood from Arm, Right Updated:  01/24/19 2225     Sodium 145 mmol/L      Potassium 4 0 mmol/L      Chloride 107 mmol/L      CO2 29 mmol/L      ANION GAP 9 mmol/L      BUN 30 (H) mg/dL      Creatinine 1 42 (H) mg/dL      Glucose 109 mg/dL      Calcium 9 4 mg/dL      eGFR 37 ml/min/1 73sq m Narrative:         National Kidney Disease Education Program recommendations are as follows:  GFR calculation is accurate only with a steady state creatinine  Chronic Kidney disease less than 60 ml/min/1 73 sq  meters  Kidney failure less than 15 ml/min/1 73 sq  meters  Blood gas, venous [[id number]]  (Abnormal) Collected:  01/24/19 2209    Lab Status:  Final result Specimen:  Blood from Arm, Right Updated:  01/24/19 2216     pH, Luis 7 413 (H)     pCO2, Luis 43 6 mm Hg      pO2, Luis 76 6 (H) mm Hg      HCO3, Luis 27 2 mmol/L      Base Excess, Luis 2 3 mmol/L      O2 Content, Luis 15 2 ml/dL      O2 HGB, VENOUS 93 0 (H) %     CBC and differential [[id number]]  (Abnormal) Collected:  01/24/19 2202    Lab Status:  Final result Specimen:  Blood from Arm, Right Updated:  01/24/19 2209     WBC 5 63 Thousand/uL      RBC 3 33 (L) Million/uL      Hemoglobin 11 0 (L) g/dL      Hematocrit 34 2 (L) %       (H) fL      MCH 33 0 pg      MCHC 32 2 g/dL      RDW 13 4 %      MPV 12 2 fL      Platelets 187 Thousands/uL      nRBC 0 /100 WBCs      Neutrophils Relative 64 %      Immat GRANS % 1 %      Lymphocytes Relative 20 %      Monocytes Relative 12 %      Eosinophils Relative 3 %      Basophils Relative 0 %      Neutrophils Absolute 3 58 Thousands/µL      Immature Grans Absolute 0 04 Thousand/uL      Lymphocytes Absolute 1 14 Thousands/µL      Monocytes Absolute 0 66 Thousand/µL      Eosinophils Absolute 0 19 Thousand/µL      Basophils Absolute 0 02 Thousands/µL                  XR chest 2 views   ED Interpretation by Nicole Brumfield PA-C (01/24 2324)   RLL RUL infiltrate  Final Result by Traci Cason DO (01/25 0304)      No acute cardiopulmonary disease              Workstation performed: [id number]                    Procedures  Procedures       Phone Contacts  ED Phone Contact    ED Course           Identification of Seniors at Risk      Most Recent Value   (ISAR) Identification of Seniors at Risk   Before the illness or injury that brought you to the Emergency, did you need someone to help you on a regular basis? 1 Filed at: 01/24/2019 2112   In the last 24 hours, have you needed more help than usual?  0 Filed at: 01/24/2019 2112   Have you been hospitalized for one or more nights during the past 6 months? 1 Filed at: 01/24/2019 2112   In general, do you see well?  0 Filed at: 01/24/2019 2112   In general, do you have serious problems with your memory? 0 Filed at: 01/24/2019 2112   Do you take more than three different medications every day? 1 Filed at: 01/24/2019 2112   ISAR Score  3 Filed at: 01/24/2019 2112                          MDM  Number of Diagnoses or Management Options  Acute encephalopathy: new and requires workup  HCAP (healthcare-associated pneumonia): new and requires workup  Diagnosis management comments: DDx including but not limited to: metabolic abnormality, intracranial etiology, cardiac etiology, substance abuse, infectious etiology including UTI, thyroid disease  Plan: cardiac workup  VBG  XR chest  UA  Amount and/or Complexity of Data Reviewed  Clinical lab tests: ordered and reviewed  Tests in the radiology section of CPT®: ordered and reviewed  Independent visualization of images, tracings, or specimens: yes    Risk of Complications, Morbidity, and/or Mortality  Presenting problems: moderate  Management options: low  General comments: 68year-old female here for evaluation of altered mental status  Cough  Questionable pneumonia on x-ray  Will treat as healthcare associated  IV antibiotics  Admission given altered mental status and healthcare associated  Patient and daughter agree with plan  Stable at time of admission  No respiratory distress      Patient Progress  Patient progress: stable    CritCare Time    Disposition  Final diagnoses:   HCAP (healthcare-associated pneumonia)   Acute encephalopathy     Time reflects when diagnosis was documented in both MDM as applicable and the Disposition within this note     Time User Action Codes Description Comment    1/24/2019 11:32 PM Jerrell Jiménez Add [J18 9] HCAP (healthcare-associated pneumonia)     1/24/2019 11:32 PM Jerrell Jiménez Add [G93 40] Acute encephalopathy       ED Disposition     ED Disposition Condition Date/Time Comment    Admit  Thu Jan 24, 2019 11:33 PM Case was discussed with Dr Chelsy Cowan and the patient's admission status was agreed to be Admission Status: inpatient status to the service of Dr Chelsy Cowan          Follow-up Information     Follow up With Specialties Details Why [address], DO Internal Medicine Schedule an appointment as soon as possible for a visit in 1 day(s)  [facility] [address] [place] [address] [facility]  Follow up  [address] [place] [address]  [phone]            Discharge Medication List as of 1/26/2019  1:48 PM      START taking these medications    Details   guaiFENesin (ROBITUSSIN) 100 mg/5 mL oral solution Take 10 mL (200 mg total) by mouth every 4 (four) hours as needed (cough), Starting Sat 1/26/2019, Normal         CONTINUE these medications which have NOT CHANGED    Details   acetaminophen (TYLENOL) 500 mg tablet Take 500 mg by mouth every 6 (six) hours as needed for mild pain, Historical Med      albuterol (2 5 mg/3 mL) 0 083 % nebulizer solution Take 1 vial (2 5 mg total) by nebulization every 6 (six) hours as needed for wheezing, Starting Fri 12/14/2018, Print      albuterol (PROVENTIL HFA,VENTOLIN HFA) 90 mcg/act inhaler Inhale 2 puffs every 6 (six) hours as needed for wheezing, Starting Fri 12/14/2018, Print      allopurinol (ZYLOPRIM) 300 mg tablet Take 1 tablet (300 mg total) by mouth every morning for 14 days, Starting Fri 12/14/2018, Until Mon 1/7/2019, Print      atorvastatin (LIPITOR) 10 mg tablet Take 1 tablet (10 mg total) by mouth daily, Starting Fri 12/14/2018, Print betamethasone dipropionate (DIPROSONE) 0 05 % cream Apply topically 3 (three) times a day, Historical Med      buPROPion (WELLBUTRIN SR) 150 mg 12 hr tablet Take 1 tablet (150 mg total) by mouth daily, Starting Fri 12/14/2018, Print      carBAMazepine (TEGretol) 100 mg chewable tablet Chew 1 tablet (100 mg total) 3 (three) times a day, Starting Fri 12/14/2018, Print      Cholecalciferol (VITAMIN D) 2000 units CAPS Take 1 capsule (2,000 Units total) by mouth daily, Starting Fri 12/14/2018, Print      Citric De-Stdikxygukx-Vl Carb (RENACIDIN) SOLN Indications: 60ml to irrigate baca once a week on Friday, Historical Med      clopidogrel (PLAVIX) 75 mg tablet Take 1 tablet (75 mg total) by mouth daily, Starting Fri 12/14/2018, Print      clotrimazole (LOTRIMIN) 1 % cream Apply topically 3 (three) times a day, Historical Med      divalproex sodium (DEPAKOTE ER) 250 mg 24 hr tablet Take 1 tablet (250 mg total) by mouth 2 (two) times a day, Starting Wed 1/16/2019, No Print      fenofibrate micronized (LOFIBRA) 67 MG capsule Take 1 capsule (67 mg total) by mouth daily with breakfast, Starting Fri 12/14/2018, Print      ferrous sulfate 325 (65 Fe) mg tablet Take 1 tablet (325 mg total) by mouth 2 (two) times a day, Starting Fri 12/14/2018, Print      fluticasone-vilanterol (BREO ELLIPTA) 100-25 mcg/inh inhaler Inhale 1 puff daily, Starting Fri 12/14/2018, Print      furosemide (LASIX) 40 mg tablet Take 1 tablet (40 mg total) by mouth daily, Starting Thu 12/27/2018, Normal      levothyroxine 88 mcg tablet Take 1 tablet (88 mcg total) by mouth daily, Starting Fri 12/14/2018, Print      metoprolol tartrate (LOPRESSOR) 50 mg tablet Take 1 tablet (50 mg total) by mouth every 12 (twelve) hours, Starting Fri 12/14/2018, Print      nitroglycerin (NITROSTAT) 0 4 mg SL tablet Place 1 tablet (0 4 mg total) under the tongue every 5 (five) minutes as needed for chest pain, Starting Sat 5/12/2018, Normal      potassium chloride (K-DUR,KLOR-CON) 20 mEq tablet Take 1 tablet (20 mEq total) by mouth daily, Starting Fri 12/14/2018, Print      saccharomyces boulardii (FLORASTOR) 250 mg capsule Take 1 capsule (250 mg total) by mouth 2 (two) times a day, Starting Wed 1/16/2019, No Print      Sennosides-Docusate Sodium (SENNA PLUS PO) Take by mouth daily as needed, Historical Med      warfarin (COUMADIN) 3 mg tablet One tablet daily or as otherwise directed  , Print      diltiazem (CARDIZEM SR) 90 mg 12 hr capsule Take 2 capsules (180 mg total) by mouth every 12 (twelve) hours, Starting Fri 12/14/2018, Print             Outpatient Discharge Orders  Ambulatory Referral to [address] [place] [address] Nick Montez   Standing Status: Future  Standing Exp   Date: 07/26/19         ED Provider  Electronically Signed by           Nicole Brumfield PA-C  02/01/19 3499

## 2019-01-25 NOTE — H&P
H&P- Chris Euceda [birth year], 68 y o  female MRN: [id number]    Unit/Bed#: -01 Encounter: [id number]    Primary Care Provider: Noelle Snellen, DO   Date and time admitted to hospital: 1/24/2019  9:07 PM          * HCAP (healthcare-associated pneumonia)   Assessment & Plan    Patient reported to have home fevers and confusion which is worse beyond her dementia, also presenting with cough and found to have right-sided pneumonia  Recent admission for UTI at the beginning of January which would qualify this at the healthcare associated pneumonia  Vancomycin, cefepime and Flagyl ordered  Mucolytic ordered for congestion  Continue home oxygen  Flu panel ordered  Holding IV fluids given diastolic heart dysfunction history  Continue Lasix 40 mg daily as patient appears to be euvolemic  Monitor for clinical improvement over the next 24-48 hours  Atrial fibrillation [facility])   Assessment & Plan    Patient on Coumadin 3 mg daily  She is subtherapeutic at 1 63  She is receiving antibiotics for healthcare associated pneumonia which might increase her Coumadin levels  Will continue home Coumadin at 3 mg daily  Follow INR levels daily   -continue home metoprolol 50 mg b i d , Cardizem 180 mg b i d  Ambulatory dysfunction   Assessment & Plan    PT/OT ordered     Seizure disorder [facility])   Assessment & Plan    Continue home carbamazepine 100 mg t i d , Depakote 250 mg b i daily     She is also on bupropion 150 mg q 12 daily which I will continue  Chronic diastolic congestive heart failure (HCC)   Assessment & Plan    Appears euvolemic on exam   Continue home Lasix 40 mg daily  VTE PROPHYLAXIS:  + SCDs, cont home coumadin    CODE STATUS: FULL    Anticipated Length of Stay:  Patient will be admitted on an Inpatient basis with an anticipated length of stay of   2 midnights     Justification for Hospital Stay:  Healthcare associated pneumonia, confusion      CHIEF COMPLAINT fever    HISTORY OF PRESENT ILLNESS  Collette Glad is a 73-year-old female with multiple medical problems as below  She was recently hospitalized for UTI at the beginning of January  She presents today with reported history of fevers prior to admission  She has been coughing more than usual and her cough is nonproductive in nature  She reports multiple people who have URI symptoms around her  She does seem confused so her history is limited  She denies any fevers or chills  Denies any abdominal pain  Denies any diarrhea  Denies any urinary symptoms  No nausea or vomiting  She is only alert and oriented x1, unclear what her baseline mental status is  She does have underlying dementia as well  In the ED, basic workup revealed opacities in the right lower lobe concerning for pneumonia  She was given vanc in the ED  Medicine was consulted for admission and management of HCAP         REVIEW OF SYSTEMS  A comprehensive 10 point review system conducted all negative except as mentioned in HPI       PMH/PSH    Past Medical History:   Diagnosis Date    Atrial fibrillation ([facility] )     Brain aneurysm     CAD (coronary artery disease)     Cardiac disease     had stents 12 years ago in [address] [place] [address] CHF (congestive heart failure) (HCC)     CKD (chronic kidney disease)     COPD (chronic obstructive pulmonary disease) (HCC)     Dementia     Dementia     Disease of thyroid gland     GERD (gastroesophageal reflux disease)     Hyperlipidemia     Hyperlipidemia     Hypertension     [facility]     Migraine     Renal disorder     Seizures ([facility] )     Stroke [facility])        Past Surgical History:   Procedure Laterality Date    APPENDECTOMY      ARTERIOGRAM N/A 12/7/2018    Procedure: ARTERIOGRAM WITH STENT PLACEMENT;  Surgeon: Jess Reddy MD;  Location: [facility];  Service: Vascular    BLADDER TUMOR EXCISION     [address]  clip right frontal lobe    CARDIAC SURGERY      COLONOSCOPY      CORONARY STENT PLACEMENT      5 stents    EYE SURGERY      IR ABDOMINAL ANGIOGRAPHY / INTERVENTION  12/7/2018    MANDIBLE FRACTURE SURGERY      TONSILLECTOMY      TUBAL LIGATION      UTERINE FIBROID SURGERY         ALLERGIES  Allergies   Allergen Reactions    Spiriva Handihaler [Tiotropium Bromide Monohydrate] Swelling    Ramipril Facial Swelling    Penicillins      unsure       HOME MEDICATIONS  No current facility-administered medications on file prior to encounter        Current Outpatient Prescriptions on File Prior to Encounter   Medication Sig    acetaminophen (TYLENOL) 500 mg tablet Take 500 mg by mouth every 6 (six) hours as needed for mild pain    albuterol (2 5 mg/3 mL) 0 083 % nebulizer solution Take 1 vial (2 5 mg total) by nebulization every 6 (six) hours as needed for wheezing    albuterol (PROVENTIL HFA,VENTOLIN HFA) 90 mcg/act inhaler Inhale 2 puffs every 6 (six) hours as needed for wheezing    allopurinol (ZYLOPRIM) 300 mg tablet Take 1 tablet (300 mg total) by mouth every morning for 14 days    atorvastatin (LIPITOR) 10 mg tablet Take 1 tablet (10 mg total) by mouth daily    betamethasone dipropionate (DIPROSONE) 0 05 % cream Apply topically 3 (three) times a day    buPROPion (WELLBUTRIN SR) 150 mg 12 hr tablet Take 1 tablet (150 mg total) by mouth daily    carBAMazepine (TEGretol) 100 mg chewable tablet Chew 1 tablet (100 mg total) 3 (three) times a day    Cholecalciferol (VITAMIN D) 2000 units CAPS Take 1 capsule (2,000 Units total) by mouth daily    Citric Cr-Uolinobtkhm-Xz Carb (RENACIDIN) SOLN Indications: 60ml to irrigate baca once a week on Friday    clopidogrel (PLAVIX) 75 mg tablet Take 1 tablet (75 mg total) by mouth daily    clotrimazole (LOTRIMIN) 1 % cream Apply topically 3 (three) times a day    diltiazem (CARDIZEM SR) 90 mg 12 hr capsule Take 2 capsules (180 mg total) by mouth every 12 (twelve) hours    divalproex sodium (DEPAKOTE ER) 250 mg 24 hr tablet Take 1 tablet (250 mg total) by mouth 2 (two) times a day    fenofibrate micronized (LOFIBRA) 67 MG capsule Take 1 capsule (67 mg total) by mouth daily with breakfast    ferrous sulfate 325 (65 Fe) mg tablet Take 1 tablet (325 mg total) by mouth 2 (two) times a day    fluticasone-vilanterol (BREO ELLIPTA) 100-25 mcg/inh inhaler Inhale 1 puff daily    furosemide (LASIX) 40 mg tablet Take 1 tablet (40 mg total) by mouth daily    levothyroxine 88 mcg tablet Take 1 tablet (88 mcg total) by mouth daily (Patient taking differently: Take 100 mcg by mouth daily  )    metoprolol tartrate (LOPRESSOR) 50 mg tablet Take 1 tablet (50 mg total) by mouth every 12 (twelve) hours    nitroglycerin (NITROSTAT) 0 4 mg SL tablet Place 1 tablet (0 4 mg total) under the tongue every 5 (five) minutes as needed for chest pain    potassium chloride (K-DUR,KLOR-CON) 20 mEq tablet Take 1 tablet (20 mEq total) by mouth daily    saccharomyces boulardii (FLORASTOR) 250 mg capsule Take 1 capsule (250 mg total) by mouth 2 (two) times a day    Sennosides-Docusate Sodium (SENNA PLUS PO) Take by mouth daily as needed    warfarin (COUMADIN) 3 mg tablet One tablet daily or as otherwise directed  SOCIAL HISTORY     Marital Status:    Substance Use History:   History   Alcohol Use No     History   Smoking Status    Former Smoker    Packs/day: 5 00    Years: 40 00    Quit date: 9/7/2007   Smokeless Tobacco    Never Used     History   Drug Use No       FAMILY HISTORY  Noncontributory to current HPI    OBJECTIVE    Vitals:   Blood Pressure: 127/82 (01/24/19 2344)  Pulse: 56 (01/24/19 2344)  Temperature: 99 2 °F (37 3 °C) (01/24/19 2109)  Temp Source: Oral (01/24/19 2109)  Respirations: 18 (01/24/19 2344)  SpO2: 97 % (01/24/19 2344)    GENERAL: AAx1 to location  Does not know year or month  Did know name of President  HEENT: atraumatic, normocephalic  PERLAA  EOMI    Dry mucous membranes  NECK- supple, no JVD, no lymphadenopathy, no thryomegaly  CHEST:  Rhonchi at the right lower lobe lung field  No wheezing appreciated  CVS: S1, S2   Regular rate and rhythm, no murmurs, rub or gallops  ABDOMEN:  Soft, nontender, nondistended, normoactive bowel sounds  NEUROLOGICAL: CN II -XI grossly intact  No focal motor or sensory deficits  EXTREMITIES: No cyanosis/clubbing  Trace edema bilaterally      LAB DATA    Results Reviewed     Procedure Component Value Units Date/Time    B-type natriuretic peptide [[id number]]  (Abnormal) Collected:  01/24/19 2202    Lab Status:  Final result Specimen:  Blood from Arm, Right Updated:  01/24/19 2354     NT-proBNP 1,236 (H) pg/mL     Urine Microscopic [[id number]]  (Abnormal) Collected:  01/24/19 2219    Lab Status:  Final result Specimen:  Urine from Urine, Indwelling Davis Catheter Updated:  01/24/19 2244     RBC, UA 4-10 (A) /hpf      WBC, UA 4-10 (A) /hpf      Epithelial Cells Occasional /hpf      Bacteria, UA Occasional /hpf     Hepatic function panel [[id number]]  (Abnormal) Collected:  01/24/19 2202    Lab Status:  Final result Specimen:  Blood from Arm, Right Updated:  01/24/19 2237     Total Bilirubin 0 30 mg/dL      Bilirubin, Direct 0 12 mg/dL      Alkaline Phosphatase 64 U/L      AST 25 U/L      ALT 23 U/L      Total Protein 7 3 g/dL      Albumin 3 4 (L) g/dL     UA w Reflex to Microscopic w Reflex to Culture [[id number]]  (Abnormal) Collected:  01/24/19 2219    Lab Status:  Final result Specimen:  Urine from Urine, Indwelling Davis Catheter Updated:  01/24/19 2232     Color, UA Yellow     Clarity, UA Clear     Specific Gravity, UA 1 015     pH, UA 6 5     Leukocytes, UA Small (A)     Nitrite, UA Negative     Protein, UA 30 (1+) (A) mg/dl      Glucose, UA Negative mg/dl      Ketones, UA Negative mg/dl      Urobilinogen, UA 0 2 E U /dl      Bilirubin, UA Negative     Blood, UA Moderate (A)    Blood culture #2 [[id number]] Collected:  01/24/19 2220 Lab Status: In process Specimen:  Blood from Arm, Left Updated:  01/24/19 2228    Troponin I [[id number]]  (Normal) Collected:  01/24/19 2202    Lab Status:  Final result Specimen:  Blood from Arm, Right Updated:  01/24/19 2227     Troponin I <0 02 ng/mL     Lactic acid, plasma [[id number]]  (Normal) Collected:  01/24/19 2202    Lab Status:  Final result Specimen:  Blood from Arm, Right Updated:  01/24/19 2227     LACTIC ACID 0 8 mmol/L     Narrative:         Result may be elevated if tourniquet was used during collection  Protime-INR [[id number]]  (Abnormal) Collected:  01/24/19 2202    Lab Status:  Final result Specimen:  Blood from Arm, Right Updated:  01/24/19 2227     Protime 19 1 (H) seconds      INR 1 63 (H)    APTT [[id number]]  (Normal) Collected:  01/24/19 2202    Lab Status:  Final result Specimen:  Blood from Arm, Right Updated:  01/24/19 2227     PTT 34 seconds     Basic metabolic panel [[id number]]  (Abnormal) Collected:  01/24/19 2202    Lab Status:  Final result Specimen:  Blood from Arm, Right Updated:  01/24/19 2225     Sodium 145 mmol/L      Potassium 4 0 mmol/L      Chloride 107 mmol/L      CO2 29 mmol/L      ANION GAP 9 mmol/L      BUN 30 (H) mg/dL      Creatinine 1 42 (H) mg/dL      Glucose 109 mg/dL      Calcium 9 4 mg/dL      eGFR 37 ml/min/1 73sq m     Narrative:         National Kidney Disease Education Program recommendations are as follows:  GFR calculation is accurate only with a steady state creatinine  Chronic Kidney disease less than 60 ml/min/1 73 sq  meters  Kidney failure less than 15 ml/min/1 73 sq  meters      Blood gas, venous [[id number]]  (Abnormal) Collected:  01/24/19 2209    Lab Status:  Final result Specimen:  Blood from Arm, Right Updated:  01/24/19 2216     pH, Luis 7 413 (H)     pCO2, Luis 43 6 mm Hg      pO2, Luis 76 6 (H) mm Hg      HCO3, Luis 27 2 mmol/L      Base Excess, Luis 2 3 mmol/L      O2 Content, Luis 15 2 ml/dL      O2 HGB, VENOUS 93 0 (H) %     CBC and differential [[id number]]  (Abnormal) Collected:  01/24/19 2202    Lab Status:  Final result Specimen:  Blood from Arm, Right Updated:  01/24/19 2209     WBC 5 63 Thousand/uL      RBC 3 33 (L) Million/uL      Hemoglobin 11 0 (L) g/dL      Hematocrit 34 2 (L) %       (H) fL      MCH 33 0 pg      MCHC 32 2 g/dL      RDW 13 4 %      MPV 12 2 fL      Platelets 777 Thousands/uL      nRBC 0 /100 WBCs      Neutrophils Relative 64 %      Immat GRANS % 1 %      Lymphocytes Relative 20 %      Monocytes Relative 12 %      Eosinophils Relative 3 %      Basophils Relative 0 %      Neutrophils Absolute 3 58 Thousands/µL      Immature Grans Absolute 0 04 Thousand/uL      Lymphocytes Absolute 1 14 Thousands/µL      Monocytes Absolute 0 66 Thousand/µL      Eosinophils Absolute 0 19 Thousand/µL      Basophils Absolute 0 02 Thousands/µL     Blood culture #1 [[id number]] Collected:  01/24/19 2202    Lab Status: In process Specimen:  Blood from Arm, Right Updated:  01/24/19 2206          EKG, Pathology, and Other Studies Reviewed on Admission  Chest x-ray notable for right lower lobe consolidation on sulfa review  Total time spent in the process of admission, completion of records, counseling, coordination of care, discussion with patient/family was approximately 35 minutes  Getachew De Los Santos MD  HOSPITALIST SERVICES  1/25/2019      PLEASE NOTE:  This encounter was completed utilizing the [employer] Speech Voice Recognition Software  Grammatical errors, random word insertions, pronoun errors and incomplete sentences are occasional consequences of the system due to software limitations, ambient noise and hardware issues  These may be missed by proof reading prior to affixing electronic signature  Any questions or concerns about the content, text or information contained within the body of this dictation should be directly addressed to the physician for clarification   Please do not hesitate to call me directly if you have any any questions or concerns

## 2019-01-25 NOTE — PROGRESS NOTES
Vancomycin Assessment    Janet Sims is a 68 y o  female who is currently receiving vancomycin 1250mg Q12H for Pneumonia     Relevant clinical data and objective history reviewed:  Creatinine   Date Value Ref Range Status   01/24/2019 1 42 (H) 0 60 - 1 30 mg/dL Final     Comment:     Standardized to IDMS reference method   01/14/2019 1 35 (H) 0 60 - 1 30 mg/dL Final     Comment:     Standardized to IDMS reference method   01/13/2019 1 42 (H) 0 60 - 1 30 mg/dL Final     Comment:     Standardized to IDMS reference method     /82 (BP Location: Right arm)   Pulse 56   Temp 99 2 °F (37 3 °C) (Oral)   Resp 18   SpO2 97%   No intake/output data recorded  Lab Results   Component Value Date/Time    BUN 30 (H) 01/24/2019 10:02 PM    WBC 5 63 01/24/2019 10:02 PM    HGB 11 0 (L) 01/24/2019 10:02 PM    HCT 34 2 (L) 01/24/2019 10:02 PM     (H) 01/24/2019 10:02 PM     01/24/2019 10:02 PM     Temp Readings from Last 3 Encounters:   01/24/19 99 2 °F (37 3 °C) (Oral)   01/21/19 98 7 °F (37 1 °C)   01/16/19 98 °F (36 7 °C) (Oral)     Vancomycin Days of Therapy: 1    Assessment/Plan  The patient is currently on vancomycin utilizing scheduled dosing based on adjusted body weight (due to obesity)  Baseline risks associated with therapy include: pre-existing renal impairment and advanced age  The patient is currently receiving 1250mg Q12H and after clinical evaluation will be changed to 1250mg Q24H  Pharmacy will also follow closely for s/sx of nephrotoxicity, infusion reactions and appropriateness of therapy  BMP and CBC will be ordered per protocol  Plan for trough as patient approaches steady state, prior to the 4th  dose at approximately 2100 on 1/28/19  Due to infection severity, will target a trough of 15-20 (appropriate for most indications)   Pharmacy will continue to follow the patients culture results and clinical progress daily      Alexander Harris, Pharmacist

## 2019-01-25 NOTE — UTILIZATION REVIEW
Initial Clinical Review    Admission: Date/Time/Statement: 1/24/19 @ 2333   Orders Placed This Encounter   Procedures    Inpatient Admission (expected length of stay for this patient is greater than two midnights)     Standing Status:   Standing     Number of Occurrences:   1     Order Specific Question:   Admitting Physician     Answer:   Kassandra Suggs [[id number]]     Order Specific Question:   Level of Care     Answer:   Med Surg [[id number]]     Order Specific Question:   Estimated length of stay     Answer:   More than 2 Midnights     Order Specific Question:   Certification     Answer:   I certify that inpatient services are medically necessary for this patient for a duration of greater than two midnights  See H&P and MD Progress Notes for additional information about the patient's course of treatment  ED: Date/Time/Mode of Arrival:   ED Arrival Information     Expected Arrival Acuity Means of Arrival Escorted By Service Admission Type    - 1/24/2019 21:07 Urgent Ambulance [facility] Medicine Urgent    Arrival Complaint    fever        Chief Complaint:   Chief Complaint   Patient presents with    Fever - 9 weeks to 74 years     pt arrives by EMS from [facility] for new onset fever, shaking and right bottom lip swelling      History of Illness: 67 yo female to ED from home w/ cough - NP  And confusion      ED Vital Signs:   ED Triage Vitals [01/24/19 2109]   Temperature Pulse Respirations Blood Pressure SpO2   99 2 °F (37 3 °C) 72 18 170/77 97 %      Temp Source Heart Rate Source Patient Position - Orthostatic VS BP Location FiO2 (%)   Oral Monitor Lying Right arm --      Pain Score       No Pain        Wt Readings from Last 1 Encounters:   01/21/19 84 9 kg (187 lb 2 7 oz)     Vital Signs (abnormal): wnl   Pertinent Labs/Diagnostic Test Results: BNP  1236, alb 3 4, pt inr   19 1  1 63, BUN creat   30  1 42   pH, Luis 7 413 (H) 7 300 - 7 400     pCO2, Luis 43 6 42 0 - 50 0 mm Hg     pO2, Luis 76 6 (H) 35 0 - 45 0 mm Hg     HCO3, Luis 27 2 24 - 30 mmol/L     Base Excess, Luis 2 3 mmol/L     O2 Content, Luis 15 2 ml/dL     O2 HGB, VENOUS 93 0 (H) 60 0 - 80 0 %    H&H   11 0  34 2  CXR -wnl   EKG- SB w / 1st deg AVB     ED Treatment:   Medication Administration from 01/24/2019 2107 to 01/25/2019 0014       Date/Time Order Dose Route Action Action by Comments     01/24/2019 0566 ceftriaxone (ROCEPHIN) 1 g/50 mL in dextrose IVPB 1,000 mg Intravenous New Bag Sarah Soto RN         Past Medical/Surgical History:    Active Ambulatory Problems     Diagnosis Date Noted    HTN (hypertension) 12/10/2017    COPD (chronic obstructive pulmonary disease) ([facility] ) 12/10/2017    Chronic diastolic congestive heart failure ([facility] ) 12/10/2017    Atrial flutter ([facility] ) 03/22/2018    Back pain 03/22/2018    Dementia 03/23/2018    Hyperlipidemia 03/23/2018    Urinary tract infection associated with catheterization of urinary tract ([facility] ) 03/24/2018    Acute encephalopathy 03/24/2018    RSV infection 03/24/2018    Ambulatory dysfunction 03/29/2018    Hypernatremia 04/13/2018    Stage [facility] 04/13/2018    Lower extremity edema 04/13/2018    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 04/18/2018    JESENIA (obstructive sleep apnea) 05/04/2018    Intermittent claudication of right lower extremity due to atherosclerosis ([facility] ) 05/04/2018    Atrial fibrillation ([facility] ) 05/07/2018    Chronic indwelling Davis catheter 06/17/2018    Bradycardia 06/18/2018    Essential hypertension 09/09/2018    Peripheral artery disease ([facility] 09/09/2018    Macrocytic anemia 12/11/2018    Aortoiliac occlusive disease ([facility] ) 01/07/2019     Past Medical History:   Diagnosis Date    Atrial fibrillation ([facility] )     Brain aneurysm     CAD (coronary artery disease)     Cardiac disease     CHF (congestive heart failure) (HCC)     CKD (chronic kidney disease)     COPD [facility]     Dementia     Dementia     Disease of thyroid gland     GERD (gastroesophageal reflux disease)     Hyperlipidemia     Hyperlipidemia     Hypertension     MI [facility]myocardial infarction) [facility]     Migraine     Renal disorder     Seizures ([place] [id number] )     Stroke [facility])      Admitting Diagnosis: Fever [R50 9]  Acute encephalopathy [G93 40]  HCAP (healthcare-associated pneumonia) [J18 9]  Age/Sex: 68 y o  female  Assessment/Plan:   * HCAP (healthcare-associated pneumonia)   Assessment & Plan     Patient reported to have home fevers and confusion which is worse beyond her dementia, also presenting with cough and found to have right-sided pneumonia  Recent admission for UTI at the beginning of January which would qualify this at the healthcare associated pneumonia  Vancomycin, cefepime and Flagyl ordered  Mucolytic ordered for congestion  Continue home oxygen  Flu panel ordered  Holding IV fluids given diastolic heart dysfunction history  Continue Lasix 40 mg daily as patient appears to be euvolemic  Monitor for clinical improvement over the next 24-48 hours        Atrial fibrillation [facility])   Assessment & Plan     Patient on Coumadin 3 mg daily  She is subtherapeutic at 1 63  She is receiving antibiotics for healthcare associated pneumonia which might increase her Coumadin levels  Will continue home Coumadin at 3 mg daily  Follow INR levels daily   -continue home metoprolol 50 mg b i d , Cardizem 180 mg b i d  [facility]     Ambulatory dysfunction   Assessment & Plan     PT/OT ordered    Seizure disorder [facility])   Assessment & Plan     Continue home carbamazepine 100 mg t i d , Depakote 250 mg b i daily     She is also on bupropion 150 mg q 12 daily which I will continue     Chronic diastolic congestive heart failure (HCC)   Assessment & Plan     Appears euvolemic on exam   Continue home Lasix 40 mg daily       Anticipated Length of Stay:  Patient will be admitted on an Inpatient basis with an anticipated length of stay of   2 midnights     Justification for Hospital Stay:  Healthcare associated pneumonia, confusion     Admission Orders:  Scheduled Meds:   Current Facility-Administered Medications:  acetaminophen 650 mg Oral Q6H PRN     albuterol 2 5 mg Nebulization Q6H PRN     atorvastatin 10 mg Oral Daily     buPROPion 150 mg Oral Daily     carBAMazepine 100 mg Oral TID     cefepime 2,000 mg Intravenous Q12H  Last Rate: 2,000 mg (01/25/19 0305)   clopidogrel 75 mg Oral Daily     diltiazem 180 mg Oral Q12H TONI     divalproex sodium 250 mg Oral BID     ferrous sulfate 325 mg Oral BID     fluticasone-vilanterol 1 puff Inhalation Daily     furosemide 40 mg Oral Daily     guaiFENesin 200 mg Oral Q4H PRN     levothyroxine 100 mcg Oral Early Morning     metoprolol tartrate 50 mg Oral Q12H TONI     metroNIDAZOLE 500 mg Oral Q8H [address]     vancomycin 20 mg/kg (Adjusted) Intravenous Q24H     warfarin 3 mg Oral Daily (warfarin)       Consult ID   Cardiac diet   OT PT eval   Up w/ assist   Fall precautions  Seizure precautions  Up w/ assist   1/25 pct , bmp ,c bc , flu , vanco trough  BUN creat   26 1 34  H&H  10 0  32 2 , plt ct  124

## 2019-01-25 NOTE — PLAN OF CARE
Problem: Potential for Falls  Goal: Patient will remain free of falls  INTERVENTIONS:  - Assess patient frequently for physical needs  -  Identify cognitive and physical deficits and behaviors that affect risk of falls  -  [place] fall precautions as indicated by assessment   - Educate patient/family on patient safety including physical limitations  - Instruct patient to call for assistance with activity based on assessment  - Modify environment to reduce risk of injury  - Consider OT/PT consult to assist with strengthening/mobility    Outcome: Progressing      Problem: SAFETY ADULT  Goal: Patient will remain free of falls  INTERVENTIONS:  - Assess patient frequently for physical needs  -  Identify cognitive and physical deficits and behaviors that affect risk of falls    -  [place] fall precautions as indicated by assessment   - Educate patient/family on patient safety including physical limitations  - Instruct patient to call for assistance with activity based on assessment  - Modify environment to reduce risk of injury  - Consider OT/PT consult to assist with strengthening/mobility    Outcome: Progressing

## 2019-01-25 NOTE — ASSESSMENT & PLAN NOTE
Patient on Coumadin 3 mg daily  She is subtherapeutic at 1 63  She is receiving antibiotics for healthcare associated pneumonia which might increase her Coumadin levels  Will continue home Coumadin at 3 mg daily  Follow INR levels daily   -continue home metoprolol 50 mg b i d , Cardizem 180 mg b i d

## 2019-01-26 VITALS
HEIGHT: 60 IN | SYSTOLIC BLOOD PRESSURE: 154 MMHG | HEART RATE: 62 BPM | BODY MASS INDEX: 35.58 KG/M2 | RESPIRATION RATE: 18 BRPM | OXYGEN SATURATION: 97 % | DIASTOLIC BLOOD PRESSURE: 74 MMHG | TEMPERATURE: 97.9 F | WEIGHT: 181.22 LBS

## 2019-01-26 PROBLEM — G93.40 ACUTE ENCEPHALOPATHY: Status: RESOLVED | Noted: 2018-03-24 | Resolved: 2019-01-26

## 2019-01-26 PROBLEM — R26.2 AMBULATORY DYSFUNCTION: Status: RESOLVED | Noted: 2018-03-29 | Resolved: 2019-01-26

## 2019-01-26 LAB
ANION GAP SERPL CALCULATED.3IONS-SCNC: 7 MMOL/L (ref 4–13)
BASOPHILS # BLD AUTO: 0.02 THOUSANDS/ΜL (ref 0–0.1)
BASOPHILS NFR BLD AUTO: 1 % (ref 0–1)
BUN SERPL-MCNC: 24 MG/DL (ref 5–25)
CALCIUM SERPL-MCNC: 9.5 MG/DL (ref 8.3–10.1)
CHLORIDE SERPL-SCNC: 106 MMOL/L (ref 100–108)
CO2 SERPL-SCNC: 30 MMOL/L (ref 21–32)
CREAT SERPL-MCNC: 1.33 MG/DL (ref 0.6–1.3)
EOSINOPHIL # BLD AUTO: 0.2 THOUSAND/ΜL (ref 0–0.61)
EOSINOPHIL NFR BLD AUTO: 5 % (ref 0–6)
ERYTHROCYTE [DISTWIDTH] IN BLOOD BY AUTOMATED COUNT: 13.3 % (ref 11.6–15.1)
GFR SERPL CREATININE-BSD FRML MDRD: 40 ML/MIN/1.73SQ M
GLUCOSE SERPL-MCNC: 109 MG/DL (ref 65–140)
HCT VFR BLD AUTO: 34.7 % (ref 34.8–46.1)
HGB BLD-MCNC: 11.3 G/DL (ref 11.5–15.4)
IMM GRANULOCYTES # BLD AUTO: 0.02 THOUSAND/UL (ref 0–0.2)
IMM GRANULOCYTES NFR BLD AUTO: 1 % (ref 0–2)
INR PPP: 1.76 (ref 0.86–1.17)
LYMPHOCYTES # BLD AUTO: 0.96 THOUSANDS/ΜL (ref 0.6–4.47)
LYMPHOCYTES NFR BLD AUTO: 22 % (ref 14–44)
MCH RBC QN AUTO: 32.8 PG (ref 26.8–34.3)
MCHC RBC AUTO-ENTMCNC: 32.6 G/DL (ref 31.4–37.4)
MCV RBC AUTO: 101 FL (ref 82–98)
MONOCYTES # BLD AUTO: 0.65 THOUSAND/ΜL (ref 0.17–1.22)
MONOCYTES NFR BLD AUTO: 15 % (ref 4–12)
NEUTROPHILS # BLD AUTO: 2.56 THOUSANDS/ΜL (ref 1.85–7.62)
NEUTS SEG NFR BLD AUTO: 56 % (ref 43–75)
NRBC BLD AUTO-RTO: 0 /100 WBCS
PLATELET # BLD AUTO: 134 THOUSANDS/UL (ref 149–390)
PMV BLD AUTO: 11.8 FL (ref 8.9–12.7)
POTASSIUM SERPL-SCNC: 3.9 MMOL/L (ref 3.5–5.3)
PROTHROMBIN TIME: 20.3 SECONDS (ref 11.8–14.2)
RBC # BLD AUTO: 3.45 MILLION/UL (ref 3.81–5.12)
SODIUM SERPL-SCNC: 143 MMOL/L (ref 136–145)
WBC # BLD AUTO: 4.41 THOUSAND/UL (ref 4.31–10.16)

## 2019-01-26 PROCEDURE — 85025 COMPLETE CBC W/AUTO DIFF WBC: CPT | Performed by: GENERAL PRACTICE

## 2019-01-26 PROCEDURE — 85610 PROTHROMBIN TIME: CPT | Performed by: INTERNAL MEDICINE

## 2019-01-26 PROCEDURE — 99239 HOSP IP/OBS DSCHRG MGMT >30: CPT | Performed by: GENERAL PRACTICE

## 2019-01-26 PROCEDURE — 80048 BASIC METABOLIC PNL TOTAL CA: CPT | Performed by: GENERAL PRACTICE

## 2019-01-26 RX ORDER — GUAIFENESIN 100 MG/5ML
200 SOLUTION ORAL EVERY 4 HOURS PRN
Qty: 118 ML | Refills: 0 | Status: SHIPPED | OUTPATIENT
Start: 2019-01-26 | End: 2019-10-03

## 2019-01-26 RX ADMIN — DIVALPROEX SODIUM 250 MG: 250 TABLET, FILM COATED, EXTENDED RELEASE ORAL at 08:01

## 2019-01-26 RX ADMIN — CARBAMAZEPINE 100 MG: 100 TABLET, CHEWABLE ORAL at 08:01

## 2019-01-26 RX ADMIN — FLUTICASONE FUROATE AND VILANTEROL TRIFENATATE 1 PUFF: 100; 25 POWDER RESPIRATORY (INHALATION) at 08:07

## 2019-01-26 RX ADMIN — FUROSEMIDE 40 MG: 40 TABLET ORAL at 08:01

## 2019-01-26 RX ADMIN — LEVOTHYROXINE SODIUM 100 MCG: 100 TABLET ORAL at 06:23

## 2019-01-26 RX ADMIN — CLOPIDOGREL BISULFATE 75 MG: 75 TABLET ORAL at 08:01

## 2019-01-26 RX ADMIN — FERROUS SULFATE TAB 325 MG (65 MG ELEMENTAL FE) 325 MG: 325 (65 FE) TAB at 08:01

## 2019-01-26 RX ADMIN — METOPROLOL TARTRATE 50 MG: 50 TABLET, FILM COATED ORAL at 08:01

## 2019-01-26 RX ADMIN — BUPROPION HYDROCHLORIDE 150 MG: 150 TABLET, FILM COATED, EXTENDED RELEASE ORAL at 08:01

## 2019-01-26 RX ADMIN — DILTIAZEM HYDROCHLORIDE 180 MG: 90 CAPSULE, EXTENDED RELEASE ORAL at 08:05

## 2019-01-26 NOTE — NURSING NOTE
Unable to print AVS due to resp pathogen profile order never collected spoke with dr Tova Youngblood discontinued and pt discharge at this time

## 2019-01-26 NOTE — DISCHARGE SUMMARY
Discharge- Rudy Watt [birth year], 68 y o  female MRN: [id number]    Unit/Bed#: -01 Encounter: [id number]    Primary Care Provider: Clemente Cheng DO   Date and time admitted to hospital: 1/24/2019  9:07 PM        Acute encephalopathy   Assessment & Plan    Continue home carbamazepine 100 mg t i d , Depakote 250 mg b i daily  Donell Wallace No pneurmonia on cxr-ID consult appreciated  Respiratory panel pending     Chronic diastolic congestive heart failure (HCC)   Assessment & Plan    Appears euvolemic on exam   Continue home Lasix 40 mg daily  Discharging Physician / Practitioner: Ngoc Santana MD  PCP: Clemente Cheng DO  Admission Date:   Admission Orders     Ordered        01/24/19 2333  Inpatient Admission (expected length of stay for this patient is greater than two midnights)  Once             Discharge Date: 01/26/19    Resolved Problems  Date Reviewed: 1/26/2019    None          Consultations During Hospital Stay:  · ID    Procedures Performed:   ·     Significant Findings / Test Results:   · Viral uri    Incidental Findings:   ·      Test Results Pending at Discharge (will require follow up):   · respiraotyr pathogens profile  · Blood cultures     Outpatient Tests Requested:  ·     Complications:      Reason for Admission: altered mental status    Hospital Course: Rudy Watt is a 68 y o  female patient who originally presented to the hospital on 1/24/2019 due to altered mental status        Please see above list of diagnoses and related plan for additional information       Condition at Discharge: stable     Discharge Day Visit / Exam:     Subjective:  No c/o-feels better-has sniffles  Vitals: Blood Pressure: 154/74 (01/26/19 0700)  Pulse: 62 (01/26/19 0700)  Temperature: 97 9 °F (36 6 °C) (01/26/19 0700)  Temp Source: Oral (01/26/19 0700)  Respirations: 18 (01/26/19 0700)  Height: 5' (152 4 cm) (01/26/19 0100)  Weight - Scale: 82 2 kg (181 lb 3 5 oz) (01/26/19 0100)  SpO2: 97 % (01/26/19 0700)  Exam:   Physical Exam   Constitutional: She appears well-developed and well-nourished  HENT:   Head: Normocephalic and atraumatic  Eyes: Pupils are equal, round, and reactive to light  Cardiovascular:   irreg irreg   Pulmonary/Chest: Effort normal and breath sounds normal    Abdominal: Soft  Bowel sounds are normal    Musculoskeletal: She exhibits no edema  Neurological: She is alert  Discussion with Family:     Discharge instructions/Information to patient and family:   See after visit summary for information provided to patient and family  Provisions for Follow-Up Care:  See after visit summary for information related to follow-up care and any pertinent home health orders  Disposition:     Other [facility] to [facility]:   · Not Applicable to this Patient - Not Applicable to this Patient    Planned Readmission:      Discharge Statement:  I spent 40 minutes discharging the patient  This time was spent on the day of discharge  I had direct contact with the patient on the day of discharge  Greater than 50% of the total time was spent examining patient, answering all patient questions, arranging and discussing plan of care with patient as well as directly providing post-discharge instructions  Additional time then spent on discharge activities  Discharge Medications:  See after visit summary for reconciled discharge medications provided to patient and family        ** Please Note: This note has been constructed using a voice recognition system **

## 2019-01-26 NOTE — SOCIAL WORK
CM met with pt at bedside to discuss PT recommendation of Home PT  Pt was unsure if she wants [facility] services, but did allow CM to place referral to [facility] for PT/Nursing  IMM reviewed and signed by pt  Copy to pt and copy to MR for scanning  Daughter [place] will transport home

## 2019-01-26 NOTE — PLAN OF CARE
Problem: Potential for Falls  Goal: Patient will remain free of falls  INTERVENTIONS:  - Assess patient frequently for physical needs  -  Identify cognitive and physical deficits and behaviors that affect risk of falls  -  [place] fall precautions as indicated by assessment   - Educate patient/family on patient safety including physical limitations  - Instruct patient to call for assistance with activity based on assessment  - Modify environment to reduce risk of injury  - Consider OT/PT consult to assist with strengthening/mobility    Outcome: Progressing      Problem: SAFETY ADULT  Goal: Patient will remain free of falls  INTERVENTIONS:  - Assess patient frequently for physical needs  -  Identify cognitive and physical deficits and behaviors that affect risk of falls    -  [place] fall precautions as indicated by assessment   - Educate patient/family on patient safety including physical limitations  - Instruct patient to call for assistance with activity based on assessment  - Modify environment to reduce risk of injury  - Consider OT/PT consult to assist with strengthening/mobility    Outcome: Progressing

## 2019-01-26 NOTE — ASSESSMENT & PLAN NOTE
Continue home carbamazepine 100 mg t i d , Depakote 250 mg b i daily  Reva Mercado       No pneurmonia on cxr-ID consult appreciated  Respiratory panel pending

## 2019-01-26 NOTE — PLAN OF CARE
Problem: DISCHARGE PLANNING - CARE MANAGEMENT  Goal: Discharge to post-acute care or home with appropriate resources  INTERVENTIONS:  - Conduct assessment to determine patient/family and health care team treatment goals, and need for post-acute services based on payer coverage, community resources, and patient preferences, and barriers to discharge  - Address psychosocial, clinical, and financial barriers to discharge as identified in assessment in conjunction with the patient/family and health care team  - Arrange appropriate level of post-acute services according to patient's   needs and preference and payer coverage in collaboration with the physician and health care team  - Communicate with and update the patient/family, physician, and health care team regarding progress on the discharge plan  - Arrange appropriate transportation to post-acute venues  Outcome: Completed Date Met: 01/26/19  CM met with pt at bedside to discuss PT recommendation of Home PT  Pt was unsure if she wants [facility] services, but did allow CM to place referral to [address] [place] [address]  for PT/Nursing  IMM reviewed and signed by pt  Copy to pt and copy to MR for scanning  Daughter Mariela Fernandez will transport home

## 2019-01-27 ENCOUNTER — HOSPITAL ENCOUNTER (EMERGENCY)
Facility: HOSPITAL | Age: 74
Discharge: HOME | End: 2019-01-27
Attending: EMERGENCY MEDICINE
Payer: MEDICARE

## 2019-01-27 VITALS
SYSTOLIC BLOOD PRESSURE: 115 MMHG | HEART RATE: 59 BPM | DIASTOLIC BLOOD PRESSURE: 75 MMHG | TEMPERATURE: 98.7 F | OXYGEN SATURATION: 94 % | RESPIRATION RATE: 18 BRPM | WEIGHT: 179.01 LBS | BODY MASS INDEX: 34.96 KG/M2

## 2019-01-27 DIAGNOSIS — T83.091A: Primary | ICD-10-CM

## 2019-01-27 PROCEDURE — 99283 EMERGENCY DEPT VISIT LOW MDM: CPT

## 2019-01-27 NOTE — ED NOTES
Call from pt's daughter, pt will get picked up between 1:30-2:00 pm, Robert Wiley RN made aware         Jordan Rodriguez RN  01/27/19 8358

## 2019-01-27 NOTE — DISCHARGE INSTRUCTIONS
Davis Catheter Placement and Care   WHAT YOU NEED TO KNOW:   A Davis catheter is a sterile tube that is inserted into your bladder to drain urine  It is also called an indwelling urinary catheter  The tip of the catheter has a small balloon filled with solution that holds the catheter inside your bladder  DISCHARGE INSTRUCTIONS:   Seek care immediately if:   · Your catheter comes out  · You suddenly have material that looks like sand in the tubing or drainage bag  · No urine is draining into the bag and you have checked the system  · You have pain in your hip, back, pelvis, or lower abdomen  · You are confused or cannot think clearly  Contact your healthcare provider if:   · You have a fever  · You have bladder spasms for more than 1 day after the catheter is placed  · You see blood in the tubing or drainage bag  · You have a rash or itching where the catheter tube is secured to your skin  · Urine leaks from or around the catheter, tubing, or drainage bag  · The closed drainage system has accidently come open or apart  · You see a layer of crystals inside the tubing  · You have questions or concerns about your condition or care  Care for your Davis catheter:   · Clean your genital area 2 times every day  Clean your catheter and the area around where it was inserted  Use soap and water  Clean your anal opening and catheter area after every bowel movement  · Secure the catheter tube  so you do not pull or move the catheter  This helps prevent pain and bladder spasms  Healthcare providers will show you how to use medical tape or a strap to secure the catheter tube to your body  · Keep a closed drainage system  Your Davis catheter should always be attached to the drainage bag to form a closed system  Do not disconnect any part of the closed system unless you need to change the bag  Care for your drainage bag:   · Ask if a leg bag is right for you    A leg bag can be worn under your clothes  Ask your healthcare provider for more information about a leg bag  · Keep the drainage bag below the level of your waist   This helps stop urine from moving back up the tubing and into your bladder  Do not loop or kink the tubing  This can cause urine to back up and collect in your bladder  Do not let the drainage bag touch or lie on the floor  · Empty the drainage bag when needed  The weight of a full drainage bag can be painful  Empty the drainage bag every 3 to 6 hours or when it is ? full  · Clean and change the drainage bag as directed  Ask your healthcare provider how often you should change the drainage bag and what cleaning solution to use  Wear disposable gloves when you change the bag  Do not allow the end of the catheter or tubing to touch anything  Clean the ends with an alcohol pad before you reconnect them  What to do if problems develop:   · No urine is draining into the bag:      ¨ Check for kinks in the tubing and straighten them out  ¨ Check the tape or strap used to secure the catheter tube to your skin  Make sure it is not blocking the tube  ¨ Make sure you are not sitting or lying on the tubing  ¨ Make sure the urine bag is hanging below the level of your waist     · Urine leaks from or around the catheter, tubing, or drainage bag:  Check if the closed drainage system has accidently come open or apart  Clean the catheter and tubing ends with a new alcohol pad and reconnect them  Prevent an infection:   · Wash your hands often  Wash before and after you touch your catheter, tubing, or drainage bag  Use soap and water  Wear clean disposable gloves when you care for your catheter or disconnect the drainage bag  Wash your hands before you prepare or eat food  · Drink liquids as directed  Ask your healthcare provider how much liquid to drink each day and which liquids are best for you   Liquids will help flush your kidneys and bladder to help prevent infection  Follow up with your healthcare provider as directed:  Write down your questions so you remember to ask them during your visits  © 2017 [facility] Information is for End User's use only and may not be sold, redistributed or otherwise used for commercial purposes  All illustrations and images included in CareNotes® are the copyrighted property of [employer] D [employer] M [employer]  or Ronald King  The above information is an  only  It is not intended as medical advice for individual conditions or treatments  Talk to your doctor, nurse or pharmacist before following any medical regimen to see if it is safe and effective for you

## 2019-01-27 NOTE — ED PROVIDER NOTES
History  Chief Complaint   Patient presents with    Groin Pain     pt co of groin pain, pt has a baca in not sure when the bag was emptied last, pt lives at [facility] [employer] personal home  Patient is a 32-year-old female past medical history of dementia, prior stroke with neurogenic bladder and chronic indwelling Baca catheter, atrial fibrillation on Coumadin, congestive heart failure, chronic kidney disease, hypertension, hyperlipidemia, GERD, coronary artery disease status post stents, seizure disorder, brain aneurysm status post clipping, presents to the emergency department by ambulance from [facility] for possible blockage of her Baca catheter  Patient overall is a poor historian but does report that she is having vaginal pain from the Baca catheter  She cannot tell me when the last time her Baca catheter was changed  According to medical records, patient had been seen in the ED earlier this month for fever, generalized weakness and did have a urinary tract infection however she does have chronic colonization  She denies any new fever, chills, headaches, dizziness, weakness, recent URI symptoms or cough, chest pain, palpitations, dyspnea, abdominal or pelvic pain, nausea, vomiting, diarrhea, constipation, blood in the urine, change in urinary frequency, extremity weakness or paresthesia or other focal neurologic deficits  History provided by:  Patient, EMS personnel and nursing home   used: No    Groin Pain   Associated symptoms: no abdominal pain, no chest pain, no congestion, no cough, no diarrhea, no ear pain, no fever, no headaches, no nausea, no rash, no rhinorrhea, no shortness of breath, no sore throat, no vomiting and no wheezing        Prior to Admission Medications   Prescriptions Last Dose Informant Patient Reported? Taking?    Cholecalciferol (VITAMIN D) 2000 units CAPS  Outside Facility (Specify) No No   Sig: Take 1 capsule (2,000 Units total) by mouth daily   Citric Yi-Fvvzvawfwln-Kd Carb (RENACIDIN) SOLN  Outside Facility (Specify) Yes No   Sig: Indications: 60ml to irrigate baca once a week on Friday   Sennosides-Docusate Sodium (SENNA PLUS PO)  Outside Facility (Specify) Yes No   Sig: Take by mouth daily as needed   acetaminophen (TYLENOL) 500 mg tablet  Outside Facility (Specify) Yes No   Sig: Take 500 mg by mouth every 6 (six) hours as needed for mild pain   albuterol (2 5 mg/3 mL) 0 083 % nebulizer solution  Outside Facility (Specify) No No   Sig: Take 1 vial (2 5 mg total) by nebulization every 6 (six) hours as needed for wheezing   albuterol (PROVENTIL HFA,VENTOLIN HFA) 90 mcg/act inhaler  Outside Facility (Specify) No No   Sig: Inhale 2 puffs every 6 (six) hours as needed for wheezing   allopurinol (ZYLOPRIM) 300 mg tablet  Outside Facility (Specify) No No   Sig: Take 1 tablet (300 mg total) by mouth every morning for 14 days   atorvastatin (LIPITOR) 10 mg tablet  Outside Facility (Specify) No No   Sig: Take 1 tablet (10 mg total) by mouth daily   betamethasone dipropionate (DIPROSONE) 0 05 % cream  Outside Facility (Specify) Yes No   Sig: Apply topically 3 (three) times a day   buPROPion (WELLBUTRIN SR) 150 mg 12 hr tablet  Outside Facility (Specify) No No   Sig: Take 1 tablet (150 mg total) by mouth daily   carBAMazepine (TEGretol) 100 mg chewable tablet  Outside Facility (Specify) No No   Sig: Chew 1 tablet (100 mg total) 3 (three) times a day   clopidogrel (PLAVIX) 75 mg tablet  Outside Facility (Specify) No No   Sig: Take 1 tablet (75 mg total) by mouth daily   clotrimazole (LOTRIMIN) 1 % cream  Outside Facility (Specify) Yes No   Sig: Apply topically 3 (three) times a day   diltiazem (CARDIZEM SR) 90 mg 12 hr capsule  Outside Facility (Specify) No No   Sig: Take 2 capsules (180 mg total) by mouth every 12 (twelve) hours   divalproex sodium (DEPAKOTE ER) 250 mg 24 hr tablet   No No   Sig: Take 1 tablet (250 mg total) by mouth 2 (two) times a day   fenofibrate micronized (LOFIBRA) 67 MG capsule  Outside Facility (Specify) No No   Sig: Take 1 capsule (67 mg total) by mouth daily with breakfast   ferrous sulfate 325 (65 Fe) mg tablet  Outside Facility (Specify) No No   Sig: Take 1 tablet (325 mg total) by mouth 2 (two) times a day   fluticasone-vilanterol (BREO ELLIPTA) 100-25 mcg/inh inhaler  Outside Facility (Specify) No No   Sig: Inhale 1 puff daily   furosemide (LASIX) 40 mg tablet  Outside Facility (Specify) No No   Sig: Take 1 tablet (40 mg total) by mouth daily   guaiFENesin (ROBITUSSIN) 100 mg/5 mL oral solution   No No   Sig: Take 10 mL (200 mg total) by mouth every 4 (four) hours as needed (cough)   levothyroxine 88 mcg tablet  Outside Facility (Specify) No No   Sig: Take 1 tablet (88 mcg total) by mouth daily   Patient taking differently: Take 100 mcg by mouth daily     metoprolol tartrate (LOPRESSOR) 50 mg tablet  Outside Facility (Specify) No No   Sig: Take 1 tablet (50 mg total) by mouth every 12 (twelve) hours   nitroglycerin (NITROSTAT) 0 4 mg SL tablet  Outside Facility (Specify) No No   Sig: Place 1 tablet (0 4 mg total) under the tongue every 5 (five) minutes as needed for chest pain   potassium chloride (K-DUR,KLOR-CON) 20 mEq tablet  Outside Facility (Specify) No No   Sig: Take 1 tablet (20 mEq total) by mouth daily   saccharomyces boulardii (FLORASTOR) 250 mg capsule   No No   Sig: Take 1 capsule (250 mg total) by mouth 2 (two) times a day   warfarin (COUMADIN) 3 mg tablet  Outside Facility (Specify) No No   Sig: One tablet daily or as otherwise directed        Facility-Administered Medications: None       Past Medical History:   Diagnosis Date    Atrial fibrillation ([facility] [address] [address] )     Brain aneurysm     CAD (coronary artery disease)     Cardiac disease     had stents 12 years ago in [address] [place] [address] CH[facility]congestive [facility] failure) [facility]     CKD (chronic kidney disease)     COPD (chronic obstructive pulmonary disease) ([facility])     Dementia     Dementia  Disease of thyroid gland     GERD (gastroesophageal reflux disease)     Hyperlipidemia     Hyperlipidemia     Hypertension     MI [facility] infarction) [facility]     Migraine     Renal disorder     [facility]ures [facility]     Stroke [facility])        Past Surgical History:   Procedure Laterality Date    APPENDECTOMY      ARTERIOGRAM N/A 12/7/2018    Procedure: ARTERIOGRAM WITH STENT PLACEMENT;  Surgeon: Dina Reddy MD;  [facility];  Service: Vascular    BLADDER TUMOR EXCISION      BRAIN SURGERY      1998  clip right frontal lobe    CARDIAC SURGERY      COLONOSCOPY      CORONARY STENT PLACEMENT      5 stents    EYE SURGERY      IR ABDOMINAL ANGIOGRAPHY / INTERVENTION  12/7/2018    MANDIBLE FRACTURE SURGERY      TONSILLECTOMY      TUBAL LIGATION      UTERINE FIBROID SURGERY         Family History   Problem Relation Age of Onset    Heart disease Father     Parkinsonism Father     Macular degeneration Mother     Glaucoma Mother     Diabetes Brother     Heart disease Brother      I have reviewed and agree with the history as documented  Social History   Substance Use Topics    Smoking status: Former Smoker     Packs/day: 5 00     Years: 40 00     Quit date: 9/7/2007    Smokeless tobacco: Never Used    Alcohol use No        Review of Systems   Constitutional: Negative for appetite change, chills and fever  HENT: Negative for congestion, ear pain, rhinorrhea and sore throat  Eyes: Negative for pain and visual disturbance  Respiratory: Negative for cough, chest tightness, shortness of breath and wheezing  Cardiovascular: Negative for chest pain and palpitations  Gastrointestinal: Negative for abdominal distention, abdominal pain, blood in stool, constipation, diarrhea, nausea and vomiting  Genitourinary: Positive for vaginal pain  Negative for dysuria, flank pain, frequency, hematuria and pelvic pain  Musculoskeletal: Negative for back pain, neck pain and neck stiffness  Skin: Negative for color change, pallor and rash  Allergic/Immunologic: Negative for immunocompromised state  Neurological: Negative for dizziness, syncope, weakness, light-headedness, numbness and headaches  Hematological: Negative for adenopathy  Psychiatric/Behavioral: Negative for confusion and decreased concentration  All other systems reviewed and are negative  Physical Exam  Physical Exam   Constitutional: She appears well-developed and well-nourished  No distress  HENT:   Head: Normocephalic and atraumatic  Mouth/Throat: Oropharynx is clear and moist    Eyes: Pupils are equal, round, and reactive to light  Conjunctivae and EOM are normal    Neck: Normal range of motion  Neck supple  No JVD present  Cardiovascular: Normal rate, regular rhythm, normal heart sounds and intact distal pulses  Exam reveals no gallop and no friction rub  No murmur heard  Pulmonary/Chest: Effort normal and breath sounds normal  No respiratory distress  She has no wheezes  She has no rales  She exhibits no tenderness  Abdominal: Soft  Bowel sounds are normal  She exhibits no distension  There is no tenderness  There is no rebound and no guarding  No abdominal, pelvic or CVA tenderness  Genitourinary:   Genitourinary Comments: Davis catheter leg bag has small amount of non cloudy yellow urine in it  Musculoskeletal: Normal range of motion  She exhibits no edema or tenderness  Neurological: She is alert  Patient oriented to person and knows she is in the hospital but disoriented to what hospital and time which is her baseline  No gross motor or sensory deficits  Skin: Skin is warm and dry  No rash noted  She is not diaphoretic  No erythema  No pallor  Psychiatric: She has a normal mood and affect  Her behavior is normal    Nursing note and vitals reviewed        Vital Signs  ED Triage Vitals   Temperature Pulse Respirations Blood Pressure SpO2   01/27/19 0758 01/27/19 0756 01/27/19 0756 01/27/19 0756 01/27/19 0756   98 7 °F (37 1 °C) 59 18 115/75 94 %      Temp Source Heart Rate Source Patient Position - Orthostatic VS BP Location FiO2 (%)   01/27/19 0758 01/27/19 0756 01/27/19 0756 01/27/19 0756 --   Oral Monitor Sitting Right arm       Pain Score       01/27/19 0756       6         Vitals:    01/27/19 0756 01/27/19 0758   BP: 115/75    BP Location: Right arm    Pulse: 59    Resp: 18    Temp:  98 7 °F (37 1 °C)   TempSrc:  Oral   SpO2: 94%    Weight: 81 2 kg (179 lb 0 2 oz)      Visual Acuity      ED Medications  Medications - No data to display    Diagnostic Studies  Results Reviewed     None                 No orders to display              Procedures  Procedures       Phone Contacts  ED Phone Contact    ED Course  ED Course as of Jan 27 0938   Sun Jan 27, 2019   0936 Catheter exchanged and patient no longer is having any vaginal pain and per nursing, urine is flowing freely  MDM  Number of Diagnoses or Management Options  Diagnosis management comments: 71-year-old female presents with pain and possibly blockage of her Davis catheter  Will exchange Davis catheter  I do not feel urinalysis is needed at this time as she does not have any infectious type symptoms and will likely be chronically colonized due to chronic indwelling Davis catheter  Will refer to Urology  CritCare Time    Disposition  Final diagnoses:   Complication, blocked Davis catheter [facility])     Time reflects when diagnosis was documented in both MDM as applicable and the Disposition within this note     Time User Action Codes Description Comment    1/27/2019  9:37 AM Lemoyne Loni CORTES Add [[id number]] Complication, blocked Davis catheter [facility])       ED Disposition     ED Disposition Condition Comment    Discharge  [facility] discharge to home/self care      Condition at discharge: Stable        Follow-up Information     Follow up With Specialties Details Why Contact Info Additional Information [facility] Schedule an appointment as soon as possible for a visit  [address]  [place] [phone]  [address]  [facility], [address], [facility], [place], [address]  [address]  Emergency Department Emergency Medicine Go to If symptoms worsen [address]  [phone] [place] [facility], [address], [place], [facility]          Patient's Medications   Discharge Prescriptions    No medications on file     No discharge procedures on file      ED Provider  Electronically Signed by           Kavitha Garcia DO  01/27/19 7659

## 2019-01-30 LAB
BACTERIA BLD CULT: NORMAL
BACTERIA BLD CULT: NORMAL

## 2019-01-31 ENCOUNTER — OFFICE VISIT (OUTPATIENT)
Dept: PULMONOLOGY | Facility: CLINIC | Age: 74
End: 2019-01-31
Payer: MEDICARE

## 2019-01-31 VITALS
BODY MASS INDEX: 34.95 KG/M2 | HEART RATE: 60 BPM | DIASTOLIC BLOOD PRESSURE: 70 MMHG | SYSTOLIC BLOOD PRESSURE: 140 MMHG | HEIGHT: 60 IN | OXYGEN SATURATION: 98 % | WEIGHT: 178 LBS

## 2019-01-31 DIAGNOSIS — G47.33 OSA (OBSTRUCTIVE SLEEP APNEA): Primary | ICD-10-CM

## 2019-01-31 DIAGNOSIS — F02.81 DEMENTIA ASSOCIATED WITH OTHER UNDERLYING DISEASE WITH BEHAVIORAL DISTURBANCE (HCC): ICD-10-CM

## 2019-01-31 DIAGNOSIS — J44.9 CHRONIC OBSTRUCTIVE PULMONARY DISEASE, UNSPECIFIED COPD TYPE (HCC): Chronic | ICD-10-CM

## 2019-01-31 PROCEDURE — 99214 OFFICE O/P EST MOD 30 MIN: CPT | Performed by: PHYSICIAN ASSISTANT

## 2019-01-31 RX ORDER — LEVOTHYROXINE SODIUM 0.1 MG/1
100 TABLET ORAL DAILY
COMMUNITY
End: 2019-10-03

## 2019-02-01 ENCOUNTER — OFFICE VISIT (OUTPATIENT)
Dept: CARDIOLOGY CLINIC | Facility: CLINIC | Age: 74
End: 2019-02-01
Payer: MEDICARE

## 2019-02-01 VITALS
DIASTOLIC BLOOD PRESSURE: 70 MMHG | HEART RATE: 52 BPM | OXYGEN SATURATION: 96 % | HEIGHT: 60 IN | WEIGHT: 177 LBS | SYSTOLIC BLOOD PRESSURE: 130 MMHG | BODY MASS INDEX: 34.75 KG/M2

## 2019-02-01 DIAGNOSIS — I10 ESSENTIAL HYPERTENSION: ICD-10-CM

## 2019-02-01 DIAGNOSIS — I48.3 TYPICAL ATRIAL FLUTTER (HCC): ICD-10-CM

## 2019-02-01 DIAGNOSIS — I48.20 CHRONIC ATRIAL FIBRILLATION (HCC): ICD-10-CM

## 2019-02-01 DIAGNOSIS — I50.32 CHRONIC DIASTOLIC CONGESTIVE HEART FAILURE (HCC): Primary | ICD-10-CM

## 2019-02-01 DIAGNOSIS — I73.9 PERIPHERAL ARTERY DISEASE (HCC): ICD-10-CM

## 2019-02-01 DIAGNOSIS — I48.0 PAROXYSMAL ATRIAL FIBRILLATION (HCC): ICD-10-CM

## 2019-02-01 DIAGNOSIS — I25.10 CORONARY ARTERY DISEASE INVOLVING NATIVE CORONARY ARTERY OF NATIVE HEART WITHOUT ANGINA PECTORIS: ICD-10-CM

## 2019-02-01 DIAGNOSIS — E78.2 MIXED HYPERLIPIDEMIA: ICD-10-CM

## 2019-02-01 PROCEDURE — 99214 OFFICE O/P EST MOD 30 MIN: CPT | Performed by: INTERNAL MEDICINE

## 2019-02-01 RX ORDER — DILTIAZEM HYDROCHLORIDE 90 MG/1
90 CAPSULE, EXTENDED RELEASE ORAL 2 TIMES DAILY
Qty: 180 CAPSULE | Refills: 3 | Status: SHIPPED | OUTPATIENT
Start: 2019-02-01 | End: 2019-02-06

## 2019-02-01 NOTE — PROGRESS NOTES
Cardiology Consultation     Verna Barnes  [id number]  1945  [address] [facility],[address] [place] [address]    C/c:   follow-up of shortness of breath, lower extremity edema     HPI:73 y  o  year old female who has a history of CAD, PAD status post recent stenting, hypertension, hyperlipidemia, CKD, paroxysmal AFib/flutter, sleep apnea  is here for follow-up  Patient was seen last visit for shortness of breath or lower extremity edema  The dose of Lasix was increased following with dyspnea and lower extremity edema is improved significantly  SHE DENIES HAVING ANY EXERTIONAL SHORTNESS OF BREATH ANYMORE  PATIENT DENIES HAVING ANY CHEST PAIN ON EXERTION  Patient denies having any pain legs on ambulation  She was recently admitted to the hospital with UTI and upper respiratory infection  Patient's heart rate is low but denies having any dizziness or syncope  She is currently normal sinus rhythm      Patient Active Problem List   Diagnosis    HTN (hypertension)    COPD (chronic obstructive pulmonary disease) (HCC)    Chronic diastolic congestive heart failure (HCC)    Atrial flutter (HCC)    Back pain    Dementia    Hyperlipidemia    Urinary tract infection associated with catheterization of urinary tract (HCC)    RSV infection    Hypernatremia    Stage 3 chronic kidney disease ([address] )    Lower extremity edema    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    JESENIA (obstructive sleep apnea)    Intermittent claudication of right lower extremity due to atherosclerosis (HCC)    Atrial fibrillation (HCC)    Chronic indwelling Davis catheter    Bradycardia    Essential hypertension    Peripheral artery disease (HCC)    Macrocytic anemia    Aortoiliac occlusive disease ([facility] )    HCAP (healthcare-associated pneumonia)     Past Medical History:   Diagnosis Date    Atrial fibrillation [facility])     Brain aneurysm     CAD (coronary artery disease)     Cardiac disease     had stents 12 years ago in [id number] [address] CHF (congestive heart failure) (HCC)     CKD (chronic kidney disease)     COPD [facility] obstructive [facility] disease) ([facility])     Dementia     Dementia     Disease of thyroid gland     GERD (gastroesophageal reflux disease)     Hyperlipidemia     Hyperlipidemia     Hypertension     MI (myocardial infarction) [facility] )     Migraine     Renal disorder     Seizures ([facility] )     Stroke ([facility] )      Social History     Social History    Marital status:      Spouse name: N/A    Number of children: 3    Years of education: N/A     Occupational History    retired      Social History Main Topics    Smoking status: Former Smoker     Packs/day: 5 00     Years: 40 00     Quit date: 9/7/2007    Smokeless tobacco: Never Used    Alcohol use No    Drug use: No    Sexual activity: No     Other Topics Concern    Not on file     Social History Narrative    No narrative on file      Family History   Problem Relation Age of Onset    Heart disease Father     Parkinsonism Father     Macular degeneration Mother     Glaucoma Mother     Diabetes Brother     Heart disease Brother      Past Surgical History:   Procedure Laterality Date    APPENDECTOMY      ARTERIOGRAM N/A 12/7/2018    Procedure: ARTERIOGRAM WITH STENT PLACEMENT;  Surgeon: Jess Reddy MD;  [facility];  Service: Vascular    BLADDER TUMOR EXCISION      BRAIN SURGERY      1998  clip right frontal lobe    CARDIAC SURGERY      COLONOSCOPY      CORONARY STENT PLACEMENT      5 stents    EYE SURGERY      IR ABDOMINAL ANGIOGRAPHY / INTERVENTION  12/7/2018    MANDIBLE FRACTURE SURGERY      TONSILLECTOMY      TUBAL LIGATION      UTERINE FIBROID SURGERY         Current Outpatient Prescriptions:     acetaminophen (TYLENOL) 500 mg tablet, Take 500 mg by mouth every 6 (six) hours as needed for mild pain, Disp: , Rfl:     albuterol (2 5 mg/3 mL) 0 083 % nebulizer solution, Take 1 vial (2 5 mg total) by nebulization every 6 (six) hours as needed for wheezing, Disp: 1 vial, Rfl: 0    albuterol (PROVENTIL HFA,VENTOLIN HFA) 90 mcg/act inhaler, Inhale 2 puffs every 6 (six) hours as needed for wheezing, Disp: 1 Inhaler, Rfl: 0    atorvastatin (LIPITOR) 10 mg tablet, Take 1 tablet (10 mg total) by mouth daily, Disp: 30 tablet, Rfl: 0    betamethasone dipropionate (DIPROSONE) 0 05 % cream, Apply topically 3 (three) times a day, Disp: , Rfl:     buPROPion (WELLBUTRIN SR) 150 mg 12 hr tablet, Take 1 tablet (150 mg total) by mouth daily, Disp: 30 tablet, Rfl: 0    carBAMazepine (TEGretol) 100 mg chewable tablet, Chew 1 tablet (100 mg total) 3 (three) times a day, Disp: 90 tablet, Rfl: 0    clopidogrel (PLAVIX) 75 mg tablet, Take 1 tablet (75 mg total) by mouth daily, Disp: 90 tablet, Rfl: 0    clotrimazole (LOTRIMIN) 1 % cream, Apply topically 3 (three) times a day, Disp: , Rfl:     diltiazem (CARDIZEM SR) 90 mg 12 hr capsule, Take 2 capsules (180 mg total) by mouth every 12 (twelve) hours, Disp: 60 capsule, Rfl: 0    divalproex sodium (DEPAKOTE ER) 250 mg 24 hr tablet, Take 1 tablet (250 mg total) by mouth 2 (two) times a day, Disp: , Rfl:     fenofibrate micronized (LOFIBRA) 67 MG capsule, Take 1 capsule (67 mg total) by mouth daily with breakfast, Disp: 15 capsule, Rfl: 0    ferrous sulfate 325 (65 Fe) mg tablet, Take 1 tablet (325 mg total) by mouth 2 (two) times a day, Disp: 30 tablet, Rfl: 0    fluticasone-vilanterol (BREO ELLIPTA) 100-25 mcg/inh inhaler, Inhale 1 puff daily, Disp: 1 Inhaler, Rfl: 0    furosemide (LASIX) 40 mg tablet, Take 1 tablet (40 mg total) by mouth daily, Disp: 30 tablet, Rfl: 11    guaiFENesin (ROBITUSSIN) 100 mg/5 mL oral solution, Take 10 mL (200 mg total) by mouth every 4 (four) hours as needed (cough), Disp: 118 mL, Rfl: 0    levothyroxine 100 mcg tablet, Take 100 mcg by mouth daily, Disp: , Rfl:     metoprolol tartrate (LOPRESSOR) 50 mg tablet, Take 1 tablet (50 mg total) by mouth every 12 (twelve) hours, Disp: 60 tablet, Rfl: 0    nitroglycerin (NITROSTAT) 0 4 mg SL tablet, Place 1 tablet (0 4 mg total) under the tongue every 5 (five) minutes as needed for chest pain, Disp: 90 tablet, Rfl: 0    potassium chloride (K-DUR,KLOR-CON) 20 mEq tablet, Take 1 tablet (20 mEq total) by mouth daily, Disp: 15 tablet, Rfl: 0    Sennosides-Docusate Sodium (SENNA PLUS PO), Take by mouth daily as needed, Disp: , Rfl:     warfarin (COUMADIN) 3 mg tablet, One tablet daily or as otherwise directed , Disp: 30 tablet, Rfl: 0    allopurinol (ZYLOPRIM) 300 mg tablet, Take 1 tablet (300 mg total) by mouth every morning for 14 days, Disp: 14 tablet, Rfl: 0    Cholecalciferol (VITAMIN D) 2000 units CAPS, Take 1 capsule (2,000 Units total) by mouth daily, Disp: 14 capsule, Rfl: 0    Citric Vi-Zchdensdkba-Cj Carb (RENACIDIN) SOLN, Indications: 60ml to irrigate baca once a week on Friday, Disp: , Rfl:     levothyroxine 88 mcg tablet, Take 1 tablet (88 mcg total) by mouth daily (Patient not taking: Reported on 1/31/2019 ), Disp: 14 tablet, Rfl: 0    saccharomyces boulardii (FLORASTOR) 250 mg capsule, Take 1 capsule (250 mg total) by mouth 2 (two) times a day (Patient not taking: Reported on 1/31/2019 ), Disp: , Rfl: 0  Allergies   Allergen Reactions    Spiriva Handihaler [Tiotropium Bromide Monohydrate] Swelling    Ramipril Facial Swelling    Penicillins      unsure     Vitals:    02/01/19 1347   BP: 130/70   BP Location: Left arm   Patient Position: Sitting   Cuff Size: Adult   Pulse: (!) 52   SpO2: 96%   Weight: 80 3 kg (177 lb)   Height: 5' (1 524 m)       Labs:  Admission on 01/24/2019, Discharged on 01/26/2019   Component Date Value    WBC 01/24/2019 5 63     RBC 01/24/2019 3 33*    Hemoglobin 01/24/2019 11 0*    Hematocrit 01/24/2019 34 2*    MCV 01/24/2019 103*    MCH 01/24/2019 33 0     MCHC 01/24/2019 32 2     RDW 01/24/2019 13 4     MPV 01/24/2019 12 2     Platelets 32/94/2378 154     nRBC 01/24/2019 0     Neutrophils Relative 01/24/2019 64     Immat GRANS % 01/24/2019 1     Lymphocytes Relative 01/24/2019 20     Monocytes Relative 01/24/2019 12     Eosinophils Relative 01/24/2019 3     Basophils Relative 01/24/2019 0     Neutrophils Absolute 01/24/2019 3 58     Immature Grans Absolute 01/24/2019 0 04     Lymphocytes Absolute 01/24/2019 1 14     Monocytes Absolute 01/24/2019 0 66     Eosinophils Absolute 01/24/2019 0 19     Basophils Absolute 01/24/2019 0 02     Sodium 01/24/2019 145     Potassium 01/24/2019 4 0     Chloride 01/24/2019 107     CO2 01/24/2019 29     ANION GAP 01/24/2019 9     BUN 01/24/2019 30*    Creatinine 01/24/2019 1 42*    Glucose 01/24/2019 109     Calcium 01/24/2019 9 4     eGFR 01/24/2019 37     Total Bilirubin 01/24/2019 0 30     Bilirubin, Direct 01/24/2019 0 12     Alkaline Phosphatase 01/24/2019 64     AST 01/24/2019 25     ALT 01/24/2019 23     Total Protein 01/24/2019 7 3     Albumin 01/24/2019 3 4*    Troponin I 01/24/2019 <0 02     Color, UA 01/24/2019 Yellow     Clarity, UA 01/24/2019 Clear     Specific Gravity, UA 01/24/2019 1 015     pH, UA 01/24/2019 6 5     Leukocytes, UA 01/24/2019 Small*    Nitrite, UA 01/24/2019 Negative     Protein, UA 01/24/2019 30 (1+)*    Glucose, UA 01/24/2019 Negative     Ketones, UA 01/24/2019 Negative     Urobilinogen, UA 01/24/2019 0 2     Bilirubin, UA 01/24/2019 Negative     Blood, UA 01/24/2019 Moderate*    Blood Culture 01/24/2019 No Growth After 5 Days   Blood Culture 01/24/2019 No Growth After 5 Days       Protime 01/24/2019 19 1*    INR 01/24/2019 1 63*    PTT 01/24/2019 34     LACTIC ACID 01/24/2019 0 8     pH, Luis 01/24/2019 7 413*    pCO2, Luis 01/24/2019 43 6     pO2, Luis 01/24/2019 76 6*    HCO3, Luis 01/24/2019 27 2     Base Excess, Luis 01/24/2019 2 3     O2 Content, Luis 01/24/2019 15 2     O2 HGB, VENOUS 01/24/2019 93 0*    Ventricular Rate 01/24/2019 58     Atrial Rate 01/24/2019 58     QRSD Interval 01/24/2019 100     QT Interval 01/24/2019 448     QTC Interval 01/24/2019 439     P Axis 01/24/2019 50     QRS Axis 01/24/2019 8     T Wave Axis 01/24/2019 36     RBC, UA 01/24/2019 4-10*    WBC, UA 01/24/2019 4-10*    Epithelial Cells 01/24/2019 Occasional     Bacteria, UA 01/24/2019 Occasional     NT-proBNP 01/24/2019 1236*    INFLU A PCR 01/25/2019 None Detected     INFLU B PCR 01/25/2019 None Detected     RSV PCR 01/25/2019 None Detected     Sodium 01/25/2019 145     Potassium 01/25/2019 3 8     Chloride 01/25/2019 108     CO2 01/25/2019 27     ANION GAP 01/25/2019 10     BUN 01/25/2019 26*    Creatinine 01/25/2019 1 34*    Glucose 01/25/2019 102     Calcium 01/25/2019 9 0     eGFR 01/25/2019 40     WBC 01/25/2019 4 53     RBC 01/25/2019 3 01*    Hemoglobin 01/25/2019 10 0*    Hematocrit 01/25/2019 32 2*    MCV 01/25/2019 107*    MCH 01/25/2019 33 2     MCHC 01/25/2019 31 1*    RDW 01/25/2019 13 6     MPV 01/25/2019 12 0     Platelets 85/42/6419 124*    nRBC 01/25/2019 0     Neutrophils Relative 01/25/2019 57     Immat GRANS % 01/25/2019 1     Lymphocytes Relative 01/25/2019 24     Monocytes Relative 01/25/2019 14*    Eosinophils Relative 01/25/2019 4     Basophils Relative 01/25/2019 0     Neutrophils Absolute 01/25/2019 2 57     Immature Grans Absolute 01/25/2019 0 03     Lymphocytes Absolute 01/25/2019 1 07     Monocytes Absolute 01/25/2019 0 65     Eosinophils Absolute 01/25/2019 0 19     Basophils Absolute 01/25/2019 0 02     Procalcitonin 01/25/2019 0 10     Protime 01/26/2019 20 3*    INR 01/26/2019 1 76*    WBC 01/26/2019 4 41     RBC 01/26/2019 3 45*    Hemoglobin 01/26/2019 11 3*    Hematocrit 01/26/2019 34 7*    MCV 01/26/2019 101*    MCH 01/26/2019 32 8     MCHC 01/26/2019 32 6     RDW 01/26/2019 13 3     MPV 01/26/2019 11 8     Platelets 77/64/7829 134*    nRBC 01/26/2019 0     Neutrophils Relative 01/26/2019 56     Immat GRANS % 01/26/2019 1     Lymphocytes Relative 01/26/2019 22     Monocytes Relative 01/26/2019 15*    Eosinophils Relative 01/26/2019 5     Basophils Relative 01/26/2019 1     Neutrophils Absolute 01/26/2019 2 56     Immature Grans Absolute 01/26/2019 0 02     Lymphocytes Absolute 01/26/2019 0 96     Monocytes Absolute 01/26/2019 0 65     Eosinophils Absolute 01/26/2019 0 20     Basophils Absolute 01/26/2019 0 02     Sodium 01/26/2019 143     Potassium 01/26/2019 3 9     Chloride 01/26/2019 106     CO2 01/26/2019 30     ANION GAP 01/26/2019 7     BUN 01/26/2019 24     Creatinine 01/26/2019 1 33*    Glucose 01/26/2019 109     Calcium 01/26/2019 9 5     eGFR 01/26/2019 40    Admission on 01/12/2019, Discharged on 01/16/2019   Component Date Value    WBC 01/12/2019 6 24     RBC 01/12/2019 3 24*    Hemoglobin 01/12/2019 10 7*    Hematocrit 01/12/2019 33 5*    MCV 01/12/2019 103*    MCH 01/12/2019 33 0     MCHC 01/12/2019 31 9     RDW 01/12/2019 13 8     MPV 01/12/2019 11 8     Platelets 35/96/3465 157     nRBC 01/12/2019 0     Neutrophils Relative 01/12/2019 55     Immat GRANS % 01/12/2019 1     Lymphocytes Relative 01/12/2019 25     Monocytes Relative 01/12/2019 16*    Eosinophils Relative 01/12/2019 2     Basophils Relative 01/12/2019 1     Neutrophils Absolute 01/12/2019 3 53     Immature Grans Absolute 01/12/2019 0 03     Lymphocytes Absolute 01/12/2019 1 56     Monocytes Absolute 01/12/2019 0 99     Eosinophils Absolute 01/12/2019 0 10     Basophils Absolute 01/12/2019 0 03     Protime 01/12/2019 18 9*    INR 01/12/2019 1 61*    PTT 01/12/2019 33     Blood Culture 01/12/2019 No Growth After 5 Days   Blood Culture 01/12/2019 No Growth After 5 Days       INFLU A PCR 01/12/2019 None Detected     INFLU B PCR 01/12/2019 None Detected     RSV PCR 01/12/2019 None Detected     Sodium 01/12/2019 146*    Potassium 01/12/2019 3 7     Chloride 01/12/2019 106     CO2 01/12/2019 30     ANION GAP 01/12/2019 10     BUN 01/12/2019 31*    Creatinine 01/12/2019 1 68*    Glucose 01/12/2019 118     Calcium 01/12/2019 9 4     AST 01/12/2019 22     ALT 01/12/2019 25     Alkaline Phosphatase 01/12/2019 51     Total Protein 01/12/2019 6 9     Albumin 01/12/2019 3 2*    Total Bilirubin 01/12/2019 0 50     eGFR 01/12/2019 30     TSH 3RD GENERATON 01/12/2019 0 882     LACTIC ACID 01/12/2019 0 7     Color, UA 01/13/2019 Yellow     Clarity, UA 01/13/2019 Clear     Specific Gravity, UA 01/13/2019 1 010     pH, UA 01/13/2019 8 0     Leukocytes, UA 01/13/2019 Small*    Nitrite, UA 01/13/2019 Positive*    Protein, UA 01/13/2019 30 (1+)*    Glucose, UA 01/13/2019 Negative     Ketones, UA 01/13/2019 Negative     Urobilinogen, UA 01/13/2019 0 2     Bilirubin, UA 01/13/2019 Negative     Blood, UA 01/13/2019 Small*    Troponin I 01/12/2019 0 02     Procalcitonin 01/12/2019 0 23     Magnesium 01/12/2019 2 0     RBC, UA 01/13/2019 2-4*    WBC, UA 01/13/2019 10-20*    Epithelial Cells 01/13/2019 Occasional     Bacteria, UA 01/13/2019 Moderate*    Ca Oxalate Joan, UA 01/13/2019 Occasional*    WBC Clumps 01/13/2019 occasional      Urine Culture 01/13/2019 >100,000 cfu/ml Providencia rettgeri*    Urine Culture 01/13/2019 >100,000 cfu/ml Enterococcus faecalis*    Urine Culture 01/13/2019 <10,000 cfu/ml      Sodium 01/13/2019 146*    Potassium 01/13/2019 3 6     Chloride 01/13/2019 109*    CO2 01/13/2019 28     ANION GAP 01/13/2019 9     BUN 01/13/2019 31*    Creatinine 01/13/2019 1 42*    Glucose 01/13/2019 96     Calcium 01/13/2019 9 1     AST 01/13/2019 19     ALT 01/13/2019 24     Alkaline Phosphatase 01/13/2019 50     Total Protein 01/13/2019 6 8     Albumin 01/13/2019 3 0*    Total Bilirubin 01/13/2019 0 40     eGFR 01/13/2019 37     Magnesium 01/13/2019 2 2     Phosphorus 01/13/2019 3 8     WBC 01/13/2019 4 76     RBC 01/13/2019 3 16*    Hemoglobin 01/13/2019 10 5*    Hematocrit 01/13/2019 32 8*    MCV 01/13/2019 104*    MCH 01/13/2019 33 2     MCHC 01/13/2019 32 0     RDW 01/13/2019 13 6     MPV 01/13/2019 11 0     Platelets 64/92/5993 130*    nRBC 01/13/2019 0     Neutrophils Relative 01/13/2019 54     Immat GRANS % 01/13/2019 0     Lymphocytes Relative 01/13/2019 28     Monocytes Relative 01/13/2019 16*    Eosinophils Relative 01/13/2019 2     Basophils Relative 01/13/2019 0     Neutrophils Absolute 01/13/2019 2 52     Immature Grans Absolute 01/13/2019 0 02     Lymphocytes Absolute 01/13/2019 1 34     Monocytes Absolute 01/13/2019 0 78     Eosinophils Absolute 01/13/2019 0 08     Basophils Absolute 01/13/2019 0 02     Protime 01/13/2019 19 4*    INR 01/13/2019 1 66*    Carbamazepine Lvl 01/13/2019 9 0     Valproic Acid, Total 01/13/2019 32*    Procalcitonin 01/13/2019 0 17     Ventricular Rate 01/12/2019 50     Atrial Rate 01/12/2019 50     HI Interval 01/12/2019 184     QRSD Interval 01/12/2019 102     QT Interval 01/12/2019 476     QTC Interval 01/12/2019 433     P Axis 01/12/2019 48     QRS Axis 01/12/2019 -1     T Wave Axis 01/12/2019 54     Protime 01/14/2019 20 9*    INR 01/14/2019 1 82*    WBC 01/14/2019 4 66     RBC 01/14/2019 3 32*    Hemoglobin 01/14/2019 11 0*    Hematocrit 01/14/2019 34 1*    MCV 01/14/2019 103*    MCH 01/14/2019 33 1     MCHC 01/14/2019 32 3     RDW 01/14/2019 13 2     MPV 01/14/2019 11 2     Platelets 09/09/0289 145*    nRBC 01/14/2019 0     Neutrophils Relative 01/14/2019 48     Immat GRANS % 01/14/2019 0     Lymphocytes Relative 01/14/2019 30     Monocytes Relative 01/14/2019 19*    Eosinophils Relative 01/14/2019 3     Basophils Relative 01/14/2019 0     Neutrophils Absolute 01/14/2019 2 22     Immature Grans Absolute 01/14/2019 0 02     Lymphocytes Absolute 01/14/2019 1 38     Monocytes Absolute 01/14/2019 0 89     Eosinophils Absolute 01/14/2019 0 13     Basophils Absolute 01/14/2019 0 02     Sodium 01/14/2019 146*    Potassium 01/14/2019 3 9     Chloride 01/14/2019 108     CO2 01/14/2019 27     ANION GAP 01/14/2019 11     BUN 01/14/2019 29*    Creatinine 01/14/2019 1 35*    Glucose 01/14/2019 101     Calcium 01/14/2019 9 4     eGFR 01/14/2019 39     Protime 01/15/2019 25 9*    INR 01/15/2019 2 41*    Protime 01/16/2019 26 4*    INR 01/16/2019 2 47*   Admission on 12/20/2018, Discharged on 12/20/2018   Component Date Value    Sodium 12/20/2018 145     Potassium 12/20/2018 4 1     Chloride 12/20/2018 107     CO2 12/20/2018 29     ANION GAP 12/20/2018 9     BUN 12/20/2018 33*    Creatinine 12/20/2018 1 46*    Glucose 12/20/2018 131     Calcium 12/20/2018 9 9     eGFR 12/20/2018 36     WBC 12/20/2018 6 13     RBC 12/20/2018 2 90*    Hemoglobin 12/20/2018 9 5*    Hematocrit 12/20/2018 30 3*    MCV 12/20/2018 105*    MCH 12/20/2018 32 8     MCHC 12/20/2018 31 4     RDW 12/20/2018 13 6     MPV 12/20/2018 11 3     Platelets 86/15/3218 256     nRBC 12/20/2018 0     Neutrophils Relative 12/20/2018 58     Immat GRANS % 12/20/2018 1     Lymphocytes Relative 12/20/2018 28     Monocytes Relative 12/20/2018 8     Eosinophils Relative 12/20/2018 4     Basophils Relative 12/20/2018 1     Neutrophils Absolute 12/20/2018 3 59     Immature Grans Absolute 12/20/2018 0 07     Lymphocytes Absolute 12/20/2018 1 69     Monocytes Absolute 12/20/2018 0 49     Eosinophils Absolute 12/20/2018 0 26     Basophils Absolute 12/20/2018 0 03     Protime 12/20/2018 21 7*    INR 12/20/2018 1 91*    PTT 12/20/2018 34    Appointment on 12/17/2018   Component Date Value    Protime 12/17/2018 24 2*    INR 12/17/2018 2 21*   Admission on 12/07/2018, Discharged on 12/14/2018   Component Date Value    WBC 12/07/2018 12 96*    RBC 12/07/2018 3 30*    Hemoglobin 12/07/2018 11 0*    Hematocrit 12/07/2018 34 0*    MCV 12/07/2018 103*    MCH 12/07/2018 33 3     MCHC 12/07/2018 32 4     RDW 12/07/2018 12 9     Platelets 09/85/3405 137*    MPV 12/07/2018 12 0     ABO Grouping 12/07/2018 A     Rh Factor 12/07/2018 Positive     Antibody Screen 12/07/2018 Negative     Specimen Expiration Date 12/07/2018 [id number]     Sodium 12/07/2018 145     Potassium 12/07/2018 3 7     Chloride 12/07/2018 112*    CO2 12/07/2018 24     ANION GAP 12/07/2018 9     BUN 12/07/2018 32*    Creatinine 12/07/2018 1 10     Glucose 12/07/2018 166*    Calcium 12/07/2018 8 2*    eGFR 12/07/2018 50     Protime 12/08/2018 15 7*    INR 12/08/2018 1 24*    Sodium 12/08/2018 143     Potassium 12/08/2018 4 0     Chloride 12/08/2018 110*    CO2 12/08/2018 27     ANION GAP 12/08/2018 6     BUN 12/08/2018 29*    Creatinine 12/08/2018 1 09     Glucose 12/08/2018 122     Calcium 12/08/2018 9 3     eGFR 12/08/2018 51     WBC 12/08/2018 11 58*    RBC 12/08/2018 2 99*    Hemoglobin 12/08/2018 10 0*    Hematocrit 12/08/2018 31 5*    MCV 12/08/2018 105*    MCH 12/08/2018 33 4     MCHC 12/08/2018 31 7     RDW 12/08/2018 13 2     Platelets 27/61/1368 137*    MPV 12/08/2018 12 0     POC Glucose 12/08/2018 120     Protime 12/10/2018 17 3*    INR 12/10/2018 1 40*    WBC 12/11/2018 5 99     RBC 12/11/2018 2 64*    Hemoglobin 12/11/2018 8 7*    Hematocrit 12/11/2018 27 5*    MCV 12/11/2018 104*    MCH 12/11/2018 33 0     MCHC 12/11/2018 31 6     RDW 12/11/2018 13 0     MPV 12/11/2018 12 8*    Platelets 35/13/5113 112*    nRBC 12/11/2018 0     Neutrophils Relative 12/11/2018 54     Immat GRANS % 12/11/2018 1     Lymphocytes Relative 12/11/2018 28     Monocytes Relative 12/11/2018 13*    Eosinophils Relative 12/11/2018 4     Basophils Relative 12/11/2018 0     Neutrophils Absolute 12/11/2018 3 22     Immature Grans Absolute 12/11/2018 0 06  Lymphocytes Absolute 12/11/2018 1 70     Monocytes Absolute 12/11/2018 0 77     Eosinophils Absolute 12/11/2018 0 22     Basophils Absolute 12/11/2018 0 02     Sodium 12/11/2018 144     Potassium 12/11/2018 3 5     Chloride 12/11/2018 106     CO2 12/11/2018 30     ANION GAP 12/11/2018 8     BUN 12/11/2018 21     Creatinine 12/11/2018 1 18     Glucose 12/11/2018 104     Calcium 12/11/2018 8 9     eGFR 12/11/2018 46     Protime 12/11/2018 18 0*    INR 12/11/2018 1 48*    WBC 12/11/2018 6 72     RBC 12/11/2018 2 73*    Hemoglobin 12/11/2018 9 3*    Hematocrit 12/11/2018 28 5*    MCV 12/11/2018 104*    MCH 12/11/2018 34 1     MCHC 12/11/2018 32 6     RDW 12/11/2018 13 2     Platelets 71/02/1185 124*    MPV 12/11/2018 12 3     WBC 12/12/2018 6 00     RBC 12/12/2018 2 64*    Hemoglobin 12/12/2018 8 7*    Hematocrit 12/12/2018 27 7*    MCV 12/12/2018 105*    MCH 12/12/2018 33 0     MCHC 12/12/2018 31 4     RDW 12/12/2018 13 2     MPV 12/12/2018 12 6     Platelets 41/46/0207 133*    nRBC 12/12/2018 0     Neutrophils Relative 12/12/2018 54     Immat GRANS % 12/12/2018 1     Lymphocytes Relative 12/12/2018 27     Monocytes Relative 12/12/2018 14*    Eosinophils Relative 12/12/2018 4     Basophils Relative 12/12/2018 0     Neutrophils Absolute 12/12/2018 3 25     Immature Grans Absolute 12/12/2018 0 06     Lymphocytes Absolute 12/12/2018 1 62     Monocytes Absolute 12/12/2018 0 81     Eosinophils Absolute 12/12/2018 0 24     Basophils Absolute 12/12/2018 0 02     Sodium 12/12/2018 142     Potassium 12/12/2018 3 5     Chloride 12/12/2018 107     CO2 12/12/2018 28     ANION GAP 12/12/2018 7     BUN 12/12/2018 23     Creatinine 12/12/2018 1 28     Glucose 12/12/2018 116     Calcium 12/12/2018 9 8     eGFR 12/12/2018 42     Protime 12/12/2018 23 8*    INR 12/12/2018 2 12*    TSH 3RD GENERATON 12/12/2018 2 660     Vitamin B-12 12/12/2018 293     Folate 12/12/2018 18 5*    Protime 12/12/2018 25 2*    INR 12/12/2018 2 28*    Protime 12/13/2018 28 2*    INR 12/13/2018 2 64*    Sodium 12/13/2018 147*    Potassium 12/13/2018 4 0     Chloride 12/13/2018 109*    CO2 12/13/2018 31     ANION GAP 12/13/2018 7     BUN 12/13/2018 30*    Creatinine 12/13/2018 1 38*    Glucose 12/13/2018 102     Calcium 12/13/2018 9 5     eGFR 12/13/2018 38     Sodium 12/14/2018 144     Potassium 12/14/2018 4 0     Chloride 12/14/2018 109*    CO2 12/14/2018 27     ANION GAP 12/14/2018 8     BUN 12/14/2018 23     Creatinine 12/14/2018 1 26     Glucose 12/14/2018 98     Calcium 12/14/2018 9 4     eGFR 12/14/2018 43     Protime 12/14/2018 29 4*    INR 12/14/2018 2 78*   Hospital Outpatient Visit on 12/07/2018   Component Date Value    Protime 12/07/2018 14 8*    INR 12/07/2018 1 17     Activated Clotting Time,* 12/07/2018 230*    Specimen Type 12/07/2018 ARTERIAL     Activated Clotting Time,* 12/07/2018 254*    Specimen Type 12/07/2018 ARTERIAL     Activated Clotting Time,* 12/07/2018 236*    Specimen Type 12/07/2018 ARTERIAL      Imaging: Xr Chest 2 Views    Result Date: 1/25/2019  Narrative: CHEST INDICATION:   cough, fever  COMPARISON:  January 12, 2019 EXAM PERFORMED/VIEWS:  XR CHEST PA & LATERAL FINDINGS: Heart shadow is enlarged but unchanged from prior exam  The lungs are clear  No pneumothorax or pleural effusion  Osseous structures appear within normal limits for patient age  Impression: No acute cardiopulmonary disease  Workstation performed: [id number]     Xr Chest 2 Views    Result Date: 1/13/2019  Narrative: CHEST INDICATION:   Cough fever  COMPARISON:  6/16/2018 EXAM PERFORMED/VIEWS:  XR CHEST PA & LATERAL FINDINGS: Cardiomegaly appears stable  The lungs are clear  No pneumothorax or pleural effusion  Osseous structures appear within normal limits for patient age  Impression: No acute cardiopulmonary disease   Workstation performed: [id number] Review of Systems:  Review of Systems   Constitutional: Negative for diaphoresis and fatigue  HENT: Negative for congestion and facial swelling  Eyes: Negative for photophobia and visual disturbance  Respiratory: Negative for chest tightness and shortness of breath  Cardiovascular: Negative for chest pain, palpitations and leg swelling  Gastrointestinal: Negative for abdominal pain and nausea  Endocrine: Negative for cold intolerance and heat intolerance  Musculoskeletal: Negative for arthralgias and myalgias  Skin: Negative for pallor and rash  Neurological: Negative for dizziness and tremors  Psychiatric/Behavioral: Negative for sleep disturbance  The patient is not nervous/anxious  Physical Exam:  Physical Exam   Constitutional: She is oriented to person, place, and time  She appears well-developed and well-nourished  HENT:   Head: Normocephalic and atraumatic  Eyes: Pupils are equal, round, and reactive to light  Conjunctivae and EOM are normal    Neck: Normal range of motion  Neck supple  No JVD present  No thyromegaly present  Cardiovascular: Regular rhythm, S1 normal, S2 normal, normal heart sounds and intact distal pulses  Bradycardia present  Exam reveals no gallop and no friction rub  No murmur heard  Pulses:       Carotid pulses are 2+ on the right side, and 2+ on the left side  Pulmonary/Chest: Effort normal and breath sounds normal  No respiratory distress  She has no wheezes  She has no rales  Abdominal: Soft  Bowel sounds are normal  She exhibits no distension  There is no tenderness  There is no rebound and no guarding  Musculoskeletal: Normal range of motion  She exhibits no edema  Neurological: She is alert and oriented to person, place, and time  She has normal reflexes  No cranial nerve deficit  Skin: Skin is warm and dry  Psychiatric: She has a normal mood and affect         Discussion/Summary:  1-Lower extremity edema, likely multifactorial, chronic venous diease AND DIASTOLIC CHF  EDEMA IS RESOLVED  Pt advised to elevate legs whenever possible  Wear compressions stockings     ON LASIX     2-Peripheral arterial disease with recent lower extremity stenting, status post covered stent bilateral iliacs AND right SFA angioplasty  GOOD DISTAL PERFUSION  Continue Coumadin and Plavix  Continue statins     3-CAD, stable     Without anginal symptoms  ON BETA-BLOCKER, STATINS AND ASPIRIN     4-atrial fibrillation/flutter    paroxysmal  Currently normal sinus rhythm, BRADYCARDIC  Reduce dose of Cardizem to 90 mg b i d  From 180 mg b i d  On Coumadin  Rate controlled     5  Chronic diastolic CHF  Volume status improved significantly  Serum creatinine stable  Continue Lasix at current dose    Discussed plan with daughter as well as patient     Follow-up in 4 months

## 2019-02-06 DIAGNOSIS — I48.20 CHRONIC ATRIAL FIBRILLATION (HCC): Primary | ICD-10-CM

## 2019-02-06 RX ORDER — DILTIAZEM HYDROCHLORIDE 60 MG/1
60 TABLET, FILM COATED ORAL 3 TIMES DAILY
Qty: 270 TABLET | Refills: 3 | Status: SHIPPED | OUTPATIENT
Start: 2019-02-06 | End: 2019-02-07 | Stop reason: SDUPTHER

## 2019-02-07 ENCOUNTER — TELEPHONE (OUTPATIENT)
Dept: CARDIOLOGY CLINIC | Facility: CLINIC | Age: 74
End: 2019-02-07

## 2019-02-07 DIAGNOSIS — I48.20 CHRONIC ATRIAL FIBRILLATION (HCC): ICD-10-CM

## 2019-02-07 RX ORDER — DILTIAZEM HYDROCHLORIDE 60 MG/1
60 TABLET, FILM COATED ORAL 3 TIMES DAILY
Qty: 90 TABLET | Refills: 11 | Status: SHIPPED | OUTPATIENT
Start: 2019-02-07 | End: 2019-07-11 | Stop reason: SDUPTHER

## 2019-02-07 NOTE — TELEPHONE ENCOUNTER
[facility]ed [facility] [phone] and spoke with Jackelin who cx'd the script   New rx pending [facility]

## 2019-02-07 NOTE — TELEPHONE ENCOUNTER
PT SCRIPT FOR DILTIAZEM 60MG (30 DAY SUPPLY) WAS SENT TO A MAIL ORDER PHARMACY  PT DOESN'T USE MAIL ORDER  PLEASE CANCEL THIS SCRIPT AND SEND A NEW ONE TO [facility] ON [facility] IN [place] [address]

## 2019-04-03 ENCOUNTER — HOSPITAL ENCOUNTER (EMERGENCY)
Facility: HOSPITAL | Age: 74
Discharge: HOME | End: 2019-04-03
Attending: EMERGENCY MEDICINE
Payer: MEDICARE

## 2019-04-03 ENCOUNTER — APPOINTMENT (EMERGENCY)
Dept: CT IMAGING | Facility: HOSPITAL | Age: 74
End: 2019-04-03
Payer: MEDICARE

## 2019-04-03 ENCOUNTER — APPOINTMENT (EMERGENCY)
Dept: RADIOLOGY | Facility: HOSPITAL | Age: 74
End: 2019-04-03
Payer: MEDICARE

## 2019-04-03 VITALS
RESPIRATION RATE: 16 BRPM | DIASTOLIC BLOOD PRESSURE: 85 MMHG | BODY MASS INDEX: 36.79 KG/M2 | OXYGEN SATURATION: 93 % | HEIGHT: 60 IN | HEART RATE: 54 BPM | TEMPERATURE: 98 F | WEIGHT: 187.39 LBS | SYSTOLIC BLOOD PRESSURE: 183 MMHG

## 2019-04-03 DIAGNOSIS — S92.422A CLOSED FRACTURE OF DISTAL PHALANX OF LEFT GREAT TOE, INITIAL ENCOUNTER: Primary | ICD-10-CM

## 2019-04-03 DIAGNOSIS — M79.675 TOE PAIN, LEFT: ICD-10-CM

## 2019-04-03 PROCEDURE — 99283 EMERGENCY DEPT VISIT LOW MDM: CPT | Performed by: EMERGENCY MEDICINE

## 2019-04-03 PROCEDURE — 99284 EMERGENCY DEPT VISIT MOD MDM: CPT

## 2019-04-03 PROCEDURE — 72125 CT NECK SPINE W/O DYE: CPT

## 2019-04-03 PROCEDURE — 70450 CT HEAD/BRAIN W/O DYE: CPT

## 2019-04-03 PROCEDURE — 73660 X-RAY EXAM OF TOE(S): CPT

## 2019-04-10 ENCOUNTER — HOSPITAL ENCOUNTER (OUTPATIENT)
Dept: NON INVASIVE DIAGNOSTICS | Facility: CLINIC | Age: 74
Discharge: HOME | End: 2019-04-10
Payer: MEDICARE

## 2019-04-10 DIAGNOSIS — I73.9 PERIPHERAL ARTERY DISEASE (HCC): ICD-10-CM

## 2019-04-10 DIAGNOSIS — I74.09 AORTOILIAC OCCLUSIVE DISEASE (HCC): ICD-10-CM

## 2019-04-10 PROCEDURE — 93978 VASCULAR STUDY: CPT | Performed by: SURGERY

## 2019-04-10 PROCEDURE — 93978 VASCULAR STUDY: CPT

## 2019-05-03 ENCOUNTER — TELEPHONE (OUTPATIENT)
Dept: CARDIOLOGY CLINIC | Facility: CLINIC | Age: 74
End: 2019-05-03

## 2019-05-03 ENCOUNTER — HOSPITAL ENCOUNTER (EMERGENCY)
Facility: HOSPITAL | Age: 74
Discharge: HOME | End: 2019-05-03
Attending: EMERGENCY MEDICINE | Admitting: EMERGENCY MEDICINE
Payer: MEDICARE

## 2019-05-03 VITALS
HEIGHT: 60 IN | HEART RATE: 52 BPM | SYSTOLIC BLOOD PRESSURE: 123 MMHG | OXYGEN SATURATION: 93 % | DIASTOLIC BLOOD PRESSURE: 60 MMHG | TEMPERATURE: 98.1 F | WEIGHT: 187.39 LBS | BODY MASS INDEX: 36.79 KG/M2 | RESPIRATION RATE: 16 BRPM

## 2019-05-03 DIAGNOSIS — T83.9XXA FOLEY CATHETER PROBLEM, INITIAL ENCOUNTER (HCC): Primary | ICD-10-CM

## 2019-05-03 PROCEDURE — 99283 EMERGENCY DEPT VISIT LOW MDM: CPT

## 2019-05-03 PROCEDURE — 99283 EMERGENCY DEPT VISIT LOW MDM: CPT | Performed by: EMERGENCY MEDICINE

## 2019-05-21 NOTE — ASSESSMENT & PLAN NOTE
Patient advised that RETINAL CONDITION MAY LIMIT VISUAL RECOVERY following cataract surgery. · Known history with recent diagnosis patient is currently showing on ECG and telemetry atrial flutter with RVR   · Patient presented with some intermittent RVR in the ED  · Continue home dose of Cardizem and metoprolol  · Will add p r n  metoprolol 5 mg for sustained rate greater than 120  · Patient is on Coumadin her INR improving   · Telemetry monitoring: rate uncontrolled afib noted  · Cardiology consult:appreciated recommendations  · Patient is known to Dr Serafin Palacios there is concern sleep apnea could be contributing to patient's arrhythmia  Discontinue continuous pulse ox    Outpatient sleep study recommended

## 2019-05-24 ENCOUNTER — TRANSCRIBE ORDERS (OUTPATIENT)
Dept: ADMINISTRATIVE | Facility: HOSPITAL | Age: 74
End: 2019-05-24

## 2019-05-24 DIAGNOSIS — T82.856S STENOSIS OF PERIPHERAL VASCULAR STENT, SEQUELA: Primary | ICD-10-CM

## 2019-07-03 ENCOUNTER — TELEPHONE (OUTPATIENT)
Dept: CARDIOLOGY CLINIC | Facility: CLINIC | Age: 74
End: 2019-07-03

## 2019-07-03 NOTE — TELEPHONE ENCOUNTER
S/w daughter  She is in an assisted living facility and being managed there  She gave me the # to the facility   I called [phone] and lm to call me back to confirm she is being managed there

## 2019-07-03 NOTE — TELEPHONE ENCOUNTER
Called daughter and lmom reminding her that she is overdue to have inr checked  It does appear that pcp Dr Raissa Larry is managing so asked daughter to call me back to confirm

## 2019-07-09 NOTE — ASSESSMENT & PLAN NOTE
Chronic, O2 dependent  For dyspnea  Monitor intake and output, low-sodium diet encouraged    Patient appears to be euvolemic Reviewed transmission. Similar findings noted on previous transmission.   EGM's again suggestive of SVT, longest 16 minutes.   Continue to monitor.- KS

## 2019-07-11 ENCOUNTER — OFFICE VISIT (OUTPATIENT)
Dept: CARDIOLOGY CLINIC | Facility: CLINIC | Age: 74
End: 2019-07-11
Payer: MEDICARE

## 2019-07-11 VITALS
SYSTOLIC BLOOD PRESSURE: 128 MMHG | DIASTOLIC BLOOD PRESSURE: 70 MMHG | BODY MASS INDEX: 35.93 KG/M2 | HEIGHT: 60 IN | OXYGEN SATURATION: 90 % | HEART RATE: 55 BPM | WEIGHT: 183 LBS

## 2019-07-11 DIAGNOSIS — I73.9 PERIPHERAL ARTERY DISEASE (HCC): ICD-10-CM

## 2019-07-11 DIAGNOSIS — I25.10 CORONARY ARTERY DISEASE INVOLVING NATIVE CORONARY ARTERY OF NATIVE HEART WITHOUT ANGINA PECTORIS: ICD-10-CM

## 2019-07-11 DIAGNOSIS — I48.20 CHRONIC ATRIAL FIBRILLATION (HCC): ICD-10-CM

## 2019-07-11 DIAGNOSIS — R60.0 LOWER EXTREMITY EDEMA: ICD-10-CM

## 2019-07-11 DIAGNOSIS — I10 ESSENTIAL HYPERTENSION: ICD-10-CM

## 2019-07-11 DIAGNOSIS — I48.0 PAROXYSMAL ATRIAL FIBRILLATION (HCC): Primary | ICD-10-CM

## 2019-07-11 DIAGNOSIS — I50.32 CHRONIC DIASTOLIC CONGESTIVE HEART FAILURE (HCC): ICD-10-CM

## 2019-07-11 DIAGNOSIS — E78.2 MIXED HYPERLIPIDEMIA: ICD-10-CM

## 2019-07-11 PROCEDURE — 1124F ACP DISCUSS-NO DSCNMKR DOCD: CPT | Performed by: INTERNAL MEDICINE

## 2019-07-11 PROCEDURE — 99214 OFFICE O/P EST MOD 30 MIN: CPT | Performed by: INTERNAL MEDICINE

## 2019-07-11 RX ORDER — WARFARIN SODIUM 4 MG/1
TABLET ORAL
COMMUNITY
End: 2019-10-03

## 2019-07-11 NOTE — PROGRESS NOTES
Cardiology Consultation     Celia Russ  [id number]  1945  [address] [facility] [address] [address]    C/c:   follow-up of shortness of breath, lower extremity edema     HPI:73 y  o  year old female who has a history of CAD, PAD status post recent stenting, hypertension, hyperlipidemia, CKD, paroxysmal AFib/flutter, sleep apnea  is here for follow-up  PATIENT IS DOING WELL FROM CARDIAC STANDPOINT denies having any chest pain or shortness of breath on exertion  Patient has mild lower extremity edema which is chronic and is unchanged  Her weight has remained stable  Patient denies having any palpitations, dizziness or syncope  She denies having any claudication on exertion  Patient is compliant with medications  She does not have any major bleeding issues      Patient Active Problem List   Diagnosis    HTN (hypertension)    COPD (chronic obstructive pulmonary disease) (HCC)    Chronic diastolic congestive heart failure (HCC)    Atrial flutter (HCC)    Back pain    Dementia    Hyperlipidemia    Urinary tract infection associated with catheterization of urinary tract (HCC)    RSV infection    Hypernatremia    Stage 3 chronic kidney disease ([facility] )    Lower extremity edema    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    JESENIA (obstructive sleep apnea)    Intermittent claudication of right lower extremity due to atherosclerosis (HCC)    Atrial fibrillation (HCC)    Chronic indwelling Davis catheter    Bradycardia    Essential hypertension    Peripheral artery disease (HCC)    Macrocytic anemia    Aortoiliac occlusive disease ([facility] )    HCAP (healthcare-associated pneumonia)    Coronary artery disease involving native coronary artery of native heart without angina pectoris     Past Medical History:   Diagnosis Date    Atrial fibrillation ([facility] )     Brain aneurysm     CAD (coronary artery disease)     Cardiac disease     had stents 12 years ago in [address] [address] CHF (congestive heart failure) [facility])     CKD (chronic kidney disease)     COPD (chronic obstructive pulmonary disease) ([facility])     Dementia     Dementia     Disease of thyroid gland     GERD (gastroesophageal reflux disease)     Hyperlipidemia     Hyperlipidemia     Hypertension     MI (myocardial infarction) ([facility] )     Migraine     Renal disorder     Seizures ([place] [address] )     Stroke ([place] [address] )      Social History     Socioeconomic History    Marital status:      Spouse name: Not on file    Number of children: 3    Years of education: Not on file    Highest education level: Not on file   Occupational History    Occupation: retired   Social Needs    Financial resource strain: Not on file    Food insecurity:     Worry: Not on file     Inability: Not on file   Mumboe needs:     Medical: Not on file     Non-medical: Not on file   Tobacco Use    Smoking status: Former Smoker     Packs/day: 5 00     Years: 40 00     Pack years: 200 00     Last attempt to quit: 2007     Years since quittin 8    Smokeless tobacco: Never Used   Substance and Sexual Activity    Alcohol use: No     Alcohol/week: 0 0 standard drinks    Drug use: No    Sexual activity: Never   Lifestyle    Physical activity:     Days per week: Not on file     Minutes per session: Not on file    Stress: Not on file   Relationships    Social connections:     Talks on phone: Not on file     Gets together: Not on file     Attends Gnosticism service: Not on file     Active member of club or organization: Not on file     Attends meetings of clubs or organizations: Not on file     Relationship status: Not on file    Intimate partner violence:     Fear of current or ex partner: Not on file     Emotionally abused: Not on file     Physically abused: Not on file     Forced sexual activity: Not on file   Other Topics Concern    Not on file   Social History Narrative    Not on file      Family History   Problem Relation Age of Onset    Heart disease Father     Parkinsonism Father     Macular degeneration Mother     Glaucoma Mother     Diabetes Brother     Heart disease Brother      Past Surgical History:   Procedure Laterality Date    APPENDECTOMY      ARTERIOGRAM N/A 12/7/2018    Procedure: ARTERIOGRAM WITH STENT PLACEMENT;  Surgeon: Mona Reddy MD;  [facility]: [facility];  Service: Vascular    BLADDER TUMOR EXCISION      BRAIN SURGERY      1998  clip right frontal lobe    CARDIAC SURGERY      COLONOSCOPY      CORONARY STENT PLACEMENT      5 stents    EYE SURGERY      IR ABDOMINAL ANGIOGRAPHY / INTERVENTION  12/7/2018    MANDIBLE FRACTURE SURGERY      TONSILLECTOMY      TUBAL LIGATION      UTERINE FIBROID SURGERY         Current Outpatient Medications:     albuterol (PROVENTIL HFA,VENTOLIN HFA) 90 mcg/act inhaler, Inhale 2 puffs every 6 (six) hours as needed for wheezing, Disp: 1 Inhaler, Rfl: 0    atorvastatin (LIPITOR) 10 mg tablet, Take 1 tablet (10 mg total) by mouth daily, Disp: 30 tablet, Rfl: 0    buPROPion (WELLBUTRIN SR) 150 mg 12 hr tablet, Take 1 tablet (150 mg total) by mouth daily, Disp: 30 tablet, Rfl: 0    carBAMazepine (TEGretol) 100 mg chewable tablet, Chew 1 tablet (100 mg total) 3 (three) times a day, Disp: 90 tablet, Rfl: 0    Cholecalciferol (VITAMIN D) 2000 units CAPS, Take 1 capsule (2,000 Units total) by mouth daily, Disp: 14 capsule, Rfl: 0    Citric Lj-Egmqjqlemeq-Sj Carb (RENACIDIN) SOLN, Indications: 60ml to irrigate baca once a week on Friday, Disp: , Rfl:     clopidogrel (PLAVIX) 75 mg tablet, Take 1 tablet (75 mg total) by mouth daily, Disp: 90 tablet, Rfl: 0    diltiazem (CARDIZEM) 60 mg tablet, Take 1 tablet (60 mg total) by mouth 3 (three) times a day, Disp: 90 tablet, Rfl: 11    divalproex sodium (DEPAKOTE ER) 250 mg 24 hr tablet, Take 1 tablet (250 mg total) by mouth 2 (two) times a day, Disp: , Rfl:     fenofibrate micronized (LOFIBRA) 67 MG capsule, Take 1 capsule (67 mg total) by mouth daily with breakfast, Disp: 15 capsule, Rfl: 0    ferrous sulfate 325 (65 Fe) mg tablet, Take 1 tablet (325 mg total) by mouth 2 (two) times a day, Disp: 30 tablet, Rfl: 0    fluticasone-vilanterol (BREO ELLIPTA) 100-25 mcg/inh inhaler, Inhale 1 puff daily, Disp: 1 Inhaler, Rfl: 0    furosemide (LASIX) 40 mg tablet, Take 1 tablet (40 mg total) by mouth daily, Disp: 30 tablet, Rfl: 11    levothyroxine 88 mcg tablet, Take 1 tablet (88 mcg total) by mouth daily, Disp: 14 tablet, Rfl: 0    metoprolol tartrate (LOPRESSOR) 50 mg tablet, Take 1 tablet (50 mg total) by mouth every 12 (twelve) hours, Disp: 60 tablet, Rfl: 0    potassium chloride (K-DUR,KLOR-CON) 20 mEq tablet, Take 1 tablet (20 mEq total) by mouth daily, Disp: 15 tablet, Rfl: 0    warfarin (COUMADIN) 3 mg tablet, One tablet daily or as otherwise directed , Disp: 30 tablet, Rfl: 0    warfarin (COUMADIN) 4 mg tablet, Take by mouth daily, Disp: , Rfl:     acetaminophen (TYLENOL) 500 mg tablet, Take 500 mg by mouth every 6 (six) hours as needed for mild pain, Disp: , Rfl:     albuterol (2 5 mg/3 mL) 0 083 % nebulizer solution, Take 1 vial (2 5 mg total) by nebulization every 6 (six) hours as needed for wheezing, Disp: 1 vial, Rfl: 0    allopurinol (ZYLOPRIM) 300 mg tablet, Take 1 tablet (300 mg total) by mouth every morning for 14 days, Disp: 14 tablet, Rfl: 0    betamethasone dipropionate (DIPROSONE) 0 05 % cream, Apply topically 3 (three) times a day, Disp: , Rfl:     clotrimazole (LOTRIMIN) 1 % cream, Apply topically 3 (three) times a day, Disp: , Rfl:     guaiFENesin (ROBITUSSIN) 100 mg/5 mL oral solution, Take 10 mL (200 mg total) by mouth every 4 (four) hours as needed (cough) (Patient not taking: Reported on 7/11/2019), Disp: 118 mL, Rfl: 0    levothyroxine 100 mcg tablet, Take 100 mcg by mouth daily, Disp: , Rfl:    nitroglycerin (NITROSTAT) 0 4 mg SL tablet, Place 1 tablet (0 4 mg total) under the tongue every 5 (five) minutes as needed for chest pain, Disp: 90 tablet, Rfl: 0    saccharomyces boulardii (FLORASTOR) 250 mg capsule, Take 1 capsule (250 mg total) by mouth 2 (two) times a day (Patient not taking: Reported on 1/31/2019 ), Disp: , Rfl: 0    Sennosides-Docusate Sodium (SENNA PLUS PO), Take by mouth daily as needed, Disp: , Rfl:   Allergies   Allergen Reactions    Spiriva Handihaler [Tiotropium Bromide Monohydrate] Swelling    Ramipril Facial Swelling    Penicillins      unsure     Vitals:    07/11/19 1005   BP: 128/70   BP Location: Left arm   Patient Position: Sitting   Cuff Size: Adult   Pulse: 55   SpO2: 90%   Weight: 83 kg (183 lb)   Height: 5' (1 524 m)       Labs:  No visits with results within 2 Month(s) from this visit     Latest known visit with results is:   Admission on 01/24/2019, Discharged on 01/26/2019   Component Date Value    WBC 01/24/2019 5 63     RBC 01/24/2019 3 33*    Hemoglobin 01/24/2019 11 0*    Hematocrit 01/24/2019 34 2*    MCV 01/24/2019 103*    MCH 01/24/2019 33 0     MCHC 01/24/2019 32 2     RDW 01/24/2019 13 4     MPV 01/24/2019 12 2     Platelets 63/05/7256 154     nRBC 01/24/2019 0     Neutrophils Relative 01/24/2019 64     Immat GRANS % 01/24/2019 1     Lymphocytes Relative 01/24/2019 20     Monocytes Relative 01/24/2019 12     Eosinophils Relative 01/24/2019 3     Basophils Relative 01/24/2019 0     Neutrophils Absolute 01/24/2019 3 58     Immature Grans Absolute 01/24/2019 0 04     Lymphocytes Absolute 01/24/2019 1 14     Monocytes Absolute 01/24/2019 0 66     Eosinophils Absolute 01/24/2019 0 19     Basophils Absolute 01/24/2019 0 02     Sodium 01/24/2019 145     Potassium 01/24/2019 4 0     Chloride 01/24/2019 107     CO2 01/24/2019 29     ANION GAP 01/24/2019 9     BUN 01/24/2019 30*    Creatinine 01/24/2019 1 42*    Glucose 01/24/2019 109     Calcium 01/24/2019 9 4     eGFR 01/24/2019 37     Total Bilirubin 01/24/2019 0 30     Bilirubin, Direct 01/24/2019 0 12     Alkaline Phosphatase 01/24/2019 64     AST 01/24/2019 25     ALT 01/24/2019 23     Total Protein 01/24/2019 7 3     Albumin 01/24/2019 3 4*    Troponin I 01/24/2019 <0 02     Color, UA 01/24/2019 Yellow     Clarity, UA 01/24/2019 Clear     Specific Gravity, UA 01/24/2019 1 015     pH, UA 01/24/2019 6 5     Leukocytes, UA 01/24/2019 Small*    Nitrite, UA 01/24/2019 Negative     Protein, UA 01/24/2019 30 (1+)*    Glucose, UA 01/24/2019 Negative     Ketones, UA 01/24/2019 Negative     Urobilinogen, UA 01/24/2019 0 2     Bilirubin, UA 01/24/2019 Negative     Blood, UA 01/24/2019 Moderate*    Blood Culture 01/24/2019 No Growth After 5 Days   Blood Culture 01/24/2019 No Growth After 5 Days       Protime 01/24/2019 19 1*    INR 01/24/2019 1 63*    PTT 01/24/2019 34     LACTIC ACID 01/24/2019 0 8     pH, Luis 01/24/2019 7 413*    pCO2, Luis 01/24/2019 43 6     pO2, Ulis 01/24/2019 76 6*    HCO3, Luis 01/24/2019 27 2     Base Excess, Luis 01/24/2019 2 3     O2 Content, Luis 01/24/2019 15 2     O2 HGB, VENOUS 01/24/2019 93 0*    Ventricular Rate 01/24/2019 58     Atrial Rate 01/24/2019 58     QRSD Interval 01/24/2019 100     QT Interval 01/24/2019 448     QTC Interval 01/24/2019 439     P Axis 01/24/2019 50     QRS Axis 01/24/2019 8     T Wave Axis 01/24/2019 36     RBC, UA 01/24/2019 4-10*    WBC, UA 01/24/2019 4-10*    Epithelial Cells 01/24/2019 Occasional     Bacteria, UA 01/24/2019 Occasional     NT-proBNP 01/24/2019 1,236*    INFLU A PCR 01/25/2019 None Detected     INFLU B PCR 01/25/2019 None Detected     RSV PCR 01/25/2019 None Detected     Sodium 01/25/2019 145     Potassium 01/25/2019 3 8     Chloride 01/25/2019 108     CO2 01/25/2019 27     ANION GAP 01/25/2019 10     BUN 01/25/2019 26*    Creatinine 01/25/2019 1 34*    Glucose 01/25/2019 102     Calcium 01/25/2019 9 0     eGFR 01/25/2019 40     WBC 01/25/2019 4 53     RBC 01/25/2019 3 01*    Hemoglobin 01/25/2019 10 0*    Hematocrit 01/25/2019 32 2*    MCV 01/25/2019 107*    MCH 01/25/2019 33 2     MCHC 01/25/2019 31 1*    RDW 01/25/2019 13 6     MPV 01/25/2019 12 0     Platelets 19/85/0261 124*    nRBC 01/25/2019 0     Neutrophils Relative 01/25/2019 57     Immat GRANS % 01/25/2019 1     Lymphocytes Relative 01/25/2019 24     Monocytes Relative 01/25/2019 14*    Eosinophils Relative 01/25/2019 4     Basophils Relative 01/25/2019 0     Neutrophils Absolute 01/25/2019 2 57     Immature Grans Absolute 01/25/2019 0 03     Lymphocytes Absolute 01/25/2019 1 07     Monocytes Absolute 01/25/2019 0 65     Eosinophils Absolute 01/25/2019 0 19     Basophils Absolute 01/25/2019 0 02     Procalcitonin 01/25/2019 0 10     Protime 01/26/2019 20 3*    INR 01/26/2019 1 76*    WBC 01/26/2019 4 41     RBC 01/26/2019 3 45*    Hemoglobin 01/26/2019 11 3*    Hematocrit 01/26/2019 34 7*    MCV 01/26/2019 101*    MCH 01/26/2019 32 8     MCHC 01/26/2019 32 6     RDW 01/26/2019 13 3     MPV 01/26/2019 11 8     Platelets 87/10/6530 134*    nRBC 01/26/2019 0     Neutrophils Relative 01/26/2019 56     Immat GRANS % 01/26/2019 1     Lymphocytes Relative 01/26/2019 22     Monocytes Relative 01/26/2019 15*    Eosinophils Relative 01/26/2019 5     Basophils Relative 01/26/2019 1     Neutrophils Absolute 01/26/2019 2 56     Immature Grans Absolute 01/26/2019 0 02     Lymphocytes Absolute 01/26/2019 0 96     Monocytes Absolute 01/26/2019 0 65     Eosinophils Absolute 01/26/2019 0 20     Basophils Absolute 01/26/2019 0 02     Sodium 01/26/2019 143     Potassium 01/26/2019 3 9     Chloride 01/26/2019 106     CO2 01/26/2019 30     ANION GAP 01/26/2019 7     BUN 01/26/2019 24     Creatinine 01/26/2019 1 33*    Glucose 01/26/2019 109     Calcium 01/26/2019 9 5     eGFR 01/26/2019 40      Imaging: No results found  Review of Systems:  Review of Systems   Constitutional: Negative for diaphoresis and fatigue  HENT: Negative for congestion and facial swelling  Eyes: Negative for photophobia and visual disturbance  Respiratory: Negative for chest tightness and shortness of breath  Cardiovascular: Positive for leg swelling  Negative for chest pain and palpitations  Gastrointestinal: Negative for abdominal pain and nausea  Endocrine: Negative for cold intolerance and heat intolerance  Musculoskeletal: Negative for arthralgias and myalgias  Skin: Negative for pallor and rash  Neurological: Negative for dizziness and tremors  Psychiatric/Behavioral: Negative for sleep disturbance  The patient is not nervous/anxious  Physical Exam:  Physical Exam   Constitutional: She is oriented to person, place, and time  She appears well-developed and well-nourished  HENT:   Head: Normocephalic and atraumatic  Eyes: Pupils are equal, round, and reactive to light  Conjunctivae and EOM are normal    Neck: Normal range of motion  Neck supple  No JVD present  No thyromegaly present  Cardiovascular: Normal rate, regular rhythm, S1 normal, S2 normal, normal heart sounds and intact distal pulses  Exam reveals no gallop and no friction rub  No murmur heard  Pulses:       Carotid pulses are 2+ on the right side, and 2+ on the left side  TRACE EDEMA BILATERALLY   Pulmonary/Chest: Effort normal and breath sounds normal  No respiratory distress  She has no wheezes  She has no rales  Abdominal: Soft  Bowel sounds are normal  She exhibits no distension  There is no tenderness  There is no rebound and no guarding  Musculoskeletal: Normal range of motion  She exhibits no edema  Neurological: She is alert and oriented to person, place, and time  She has normal reflexes  No cranial nerve deficit  Skin: Skin is warm and dry  Psychiatric: She has a normal mood and affect  Discussion/Summary:  1-Lower extremity edema, likely multifactorial, chronic venous diease AND DIASTOLIC CHF  EDEMA IS RESOLVED  Pt advised to elevate legs whenever possible  Continue compression stockings  ON LASIX     2-Peripheral arterial disease with recent lower extremity stenting, status post covered stent bilateral iliacs AND right SFA angioplasty  GOOD DISTAL PERFUSION  Continue Coumadin and Plavix  Continue statins     3-CAD, stable     Asymptomatic  ON BETA-BLOCKER, STATINS AND ASPIRIN     4-Atrial fibrillation/flutter   CURRENTLY NORMAL SINUS RHYTHM paroxysmal  DECREASE DOSE OF CARDIZEM TO 30 MG T I D  PATIENT IS BRADYCARDIC AND COMPLAINS OF FATIGUE  On Coumadin  Rate controlled     5  Chronic diastolic CHF  EUVOLEMIC  Serum creatinine stable  Continue Lasix at current dose     Discussed plan with daughter as well as patient     Follow-up in 4 months

## 2019-07-18 ENCOUNTER — OFFICE VISIT (OUTPATIENT)
Dept: PULMONOLOGY | Facility: CLINIC | Age: 74
End: 2019-07-18
Payer: MEDICARE

## 2019-07-18 VITALS
OXYGEN SATURATION: 93 % | WEIGHT: 184 LBS | BODY MASS INDEX: 36.12 KG/M2 | SYSTOLIC BLOOD PRESSURE: 120 MMHG | HEIGHT: 60 IN | DIASTOLIC BLOOD PRESSURE: 84 MMHG | HEART RATE: 60 BPM

## 2019-07-18 DIAGNOSIS — J41.0 SIMPLE CHRONIC BRONCHITIS (HCC): Primary | Chronic | ICD-10-CM

## 2019-07-18 DIAGNOSIS — J96.11 CHRONIC HYPOXEMIC RESPIRATORY FAILURE (HCC): ICD-10-CM

## 2019-07-18 DIAGNOSIS — G47.33 OSA (OBSTRUCTIVE SLEEP APNEA): ICD-10-CM

## 2019-07-18 PROCEDURE — 99214 OFFICE O/P EST MOD 30 MIN: CPT | Performed by: PHYSICIAN ASSISTANT

## 2019-07-18 NOTE — PROGRESS NOTES
Assessment:    1  Simple chronic bronchitis ([facility] [place] [address] )     2  JESENIA (obstructive sleep apnea)     3  Chronic hypoxemic [facility] failure ([facility]           Plan:     Patient is here today for follow-up with her daughter  Continues to be a compliance issue with the CPAP due to her underlying dementia  They will continue to try each night to place her on a CPAP  Does have a mild cough currently, lung exam is clear, will continue with her Breo daily, albuterol 4 times per day as needed, Robitussin every 4 hours as needed for the cough  Will let us know if no improvement or any worsening of the symptoms  Ambulatory sats were done today in the office, O2 sats on room air at rest are 93%  Her O2 sats on ambulation on room air dropped to 87%  She was placed on 2 L nasal cannula and with ambulation her sats remained above 90%  She will continue with 2 L nasal cannula with exertion and at night  Follow up with us in 6 months or sooner if necessary  Subjective:     Patient ID: Amita Pierce is a 68 y o  female  Chief Complaint:  Patient is a 71-year-old female former smoker with past medical history of dementia, AFib, CAD, CHF, CKD, COPD, stroke, HLD  She was hospitalized in May 2018 for acute hypoxemic respiratory failure thought to be related to pulmonary edema and atrial flutter  She was also diagnosed with obstructive sleep apnea and was started on CPAP  She is here today for follow up  There continues to still be an issue with compliance with the CPAP  Due to her underlying dementia she either refuses to put the CPAP on or will take it off during the night  She does have a mild cough since yesterday which is mostly dry  No increased shortness of breath  Review of Systems   Constitutional: Negative  HENT: Negative  Respiratory: Positive for cough  Cardiovascular: Negative  Gastrointestinal: Negative  Genitourinary: Negative  Musculoskeletal: Negative  Skin: Negative  Allergic/Immunologic: Negative  Neurological: Negative  Psychiatric/Behavioral: Negative  Objective:  /84   Pulse 60   Ht 5' (1 524 m)   Wt 83 5 kg (184 lb)   SpO2 93%   BMI 35 94 kg/m²   Physical Exam   Constitutional: She is oriented to person, place, and time  She appears well-developed and well-nourished  No distress  HENT:   Mouth/Throat: Oropharynx is clear and moist    Crowded oropharyngeal airway   Eyes: Pupils are equal, round, and reactive to light  Neck:   Short and wide neck   Cardiovascular: Normal rate and regular rhythm  Pulmonary/Chest: Effort normal and breath sounds normal  No respiratory distress  She has no decreased breath sounds  She has no wheezes  She has no rhonchi  She has no rales  Abdominal: Soft  There is no tenderness  Musculoskeletal: Normal range of motion  Neurological: She is alert and oriented to person, place, and time  Skin: Skin is warm and dry  Psychiatric: She has a normal mood and affect

## 2019-09-30 ENCOUNTER — HOSPITAL ENCOUNTER (EMERGENCY)
Facility: HOSPITAL | Age: 74
Discharge: HOME | DRG: 286 | End: 2019-09-30
Attending: EMERGENCY MEDICINE
Payer: MEDICARE

## 2019-09-30 VITALS
SYSTOLIC BLOOD PRESSURE: 157 MMHG | RESPIRATION RATE: 18 BRPM | HEIGHT: 62 IN | TEMPERATURE: 99 F | HEART RATE: 118 BPM | WEIGHT: 194.67 LBS | BODY MASS INDEX: 35.82 KG/M2 | OXYGEN SATURATION: 94 % | DIASTOLIC BLOOD PRESSURE: 77 MMHG

## 2019-09-30 DIAGNOSIS — N39.0 UTI (URINARY TRACT INFECTION): Primary | ICD-10-CM

## 2019-09-30 LAB
ALBUMIN SERPL BCP-MCNC: 3.4 G/DL (ref 3.5–5)
ALP SERPL-CCNC: 57 U/L (ref 46–116)
ALT SERPL W P-5'-P-CCNC: 19 U/L (ref 12–78)
ANION GAP SERPL CALCULATED.3IONS-SCNC: 7 MMOL/L (ref 4–13)
AST SERPL W P-5'-P-CCNC: 22 U/L (ref 5–45)
BACTERIA UR QL AUTO: ABNORMAL /HPF
BASOPHILS # BLD AUTO: 0.04 THOUSANDS/ΜL (ref 0–0.1)
BASOPHILS NFR BLD AUTO: 1 % (ref 0–1)
BILIRUB SERPL-MCNC: 0.2 MG/DL (ref 0.2–1)
BILIRUB UR QL STRIP: NEGATIVE
BUN SERPL-MCNC: 33 MG/DL (ref 5–25)
CALCIUM SERPL-MCNC: 9.3 MG/DL (ref 8.3–10.1)
CHLORIDE SERPL-SCNC: 110 MMOL/L (ref 100–108)
CLARITY UR: CLEAR
CO2 SERPL-SCNC: 30 MMOL/L (ref 21–32)
COLOR UR: YELLOW
CREAT SERPL-MCNC: 1.46 MG/DL (ref 0.6–1.3)
EOSINOPHIL # BLD AUTO: 0.25 THOUSAND/ΜL (ref 0–0.61)
EOSINOPHIL NFR BLD AUTO: 4 % (ref 0–6)
ERYTHROCYTE [DISTWIDTH] IN BLOOD BY AUTOMATED COUNT: 13 % (ref 11.6–15.1)
GFR SERPL CREATININE-BSD FRML MDRD: 35 ML/MIN/1.73SQ M
GLUCOSE SERPL-MCNC: 98 MG/DL (ref 65–140)
GLUCOSE UR STRIP-MCNC: NEGATIVE MG/DL
HCT VFR BLD AUTO: 39.8 % (ref 34.8–46.1)
HGB BLD-MCNC: 12.7 G/DL (ref 11.5–15.4)
HGB UR QL STRIP.AUTO: ABNORMAL
IMM GRANULOCYTES # BLD AUTO: 0.02 THOUSAND/UL (ref 0–0.2)
IMM GRANULOCYTES NFR BLD AUTO: 0 % (ref 0–2)
KETONES UR STRIP-MCNC: NEGATIVE MG/DL
LEUKOCYTE ESTERASE UR QL STRIP: ABNORMAL
LYMPHOCYTES # BLD AUTO: 2.01 THOUSANDS/ΜL (ref 0.6–4.47)
LYMPHOCYTES NFR BLD AUTO: 35 % (ref 14–44)
MAGNESIUM SERPL-MCNC: 2.2 MG/DL (ref 1.6–2.6)
MCH RBC QN AUTO: 34.3 PG (ref 26.8–34.3)
MCHC RBC AUTO-ENTMCNC: 31.9 G/DL (ref 31.4–37.4)
MCV RBC AUTO: 108 FL (ref 82–98)
MONOCYTES # BLD AUTO: 0.59 THOUSAND/ΜL (ref 0.17–1.22)
MONOCYTES NFR BLD AUTO: 10 % (ref 4–12)
NEUTROPHILS # BLD AUTO: 2.83 THOUSANDS/ΜL (ref 1.85–7.62)
NEUTS SEG NFR BLD AUTO: 50 % (ref 43–75)
NITRITE UR QL STRIP: POSITIVE
NON-SQ EPI CELLS URNS QL MICRO: ABNORMAL /HPF
NRBC BLD AUTO-RTO: 0 /100 WBCS
PH UR STRIP.AUTO: 6 [PH]
PLATELET # BLD AUTO: 163 THOUSANDS/UL (ref 149–390)
PMV BLD AUTO: 11.8 FL (ref 8.9–12.7)
POTASSIUM SERPL-SCNC: 4.3 MMOL/L (ref 3.5–5.3)
PROT SERPL-MCNC: 7.3 G/DL (ref 6.4–8.2)
PROT UR STRIP-MCNC: ABNORMAL MG/DL
RBC # BLD AUTO: 3.7 MILLION/UL (ref 3.81–5.12)
RBC #/AREA URNS AUTO: ABNORMAL /HPF
SODIUM SERPL-SCNC: 147 MMOL/L (ref 136–145)
SP GR UR STRIP.AUTO: 1.02 (ref 1–1.03)
TROPONIN I SERPL-MCNC: <0.02 NG/ML
UROBILINOGEN UR QL STRIP.AUTO: 0.2 E.U./DL
WBC # BLD AUTO: 5.74 THOUSAND/UL (ref 4.31–10.16)
WBC #/AREA URNS AUTO: ABNORMAL /HPF

## 2019-09-30 PROCEDURE — 87186 SC STD MICRODIL/AGAR DIL: CPT | Performed by: EMERGENCY MEDICINE

## 2019-09-30 PROCEDURE — 83735 ASSAY OF MAGNESIUM: CPT | Performed by: EMERGENCY MEDICINE

## 2019-09-30 PROCEDURE — 81001 URINALYSIS AUTO W/SCOPE: CPT | Performed by: EMERGENCY MEDICINE

## 2019-09-30 PROCEDURE — 80053 COMPREHEN METABOLIC PANEL: CPT | Performed by: EMERGENCY MEDICINE

## 2019-09-30 PROCEDURE — 36415 COLL VENOUS BLD VENIPUNCTURE: CPT | Performed by: EMERGENCY MEDICINE

## 2019-09-30 PROCEDURE — 84484 ASSAY OF TROPONIN QUANT: CPT | Performed by: EMERGENCY MEDICINE

## 2019-09-30 PROCEDURE — 99284 EMERGENCY DEPT VISIT MOD MDM: CPT

## 2019-09-30 PROCEDURE — 93005 ELECTROCARDIOGRAM TRACING: CPT

## 2019-09-30 PROCEDURE — 87086 URINE CULTURE/COLONY COUNT: CPT | Performed by: EMERGENCY MEDICINE

## 2019-09-30 PROCEDURE — 99284 EMERGENCY DEPT VISIT MOD MDM: CPT | Performed by: EMERGENCY MEDICINE

## 2019-09-30 PROCEDURE — 87077 CULTURE AEROBIC IDENTIFY: CPT | Performed by: EMERGENCY MEDICINE

## 2019-09-30 PROCEDURE — 85025 COMPLETE CBC W/AUTO DIFF WBC: CPT | Performed by: EMERGENCY MEDICINE

## 2019-09-30 RX ORDER — CIPROFLOXACIN 500 MG/1
500 TABLET, FILM COATED ORAL ONCE
Status: COMPLETED | OUTPATIENT
Start: 2019-09-30 | End: 2019-09-30

## 2019-09-30 RX ORDER — CIPROFLOXACIN 500 MG/1
500 TABLET, FILM COATED ORAL 2 TIMES DAILY
Qty: 20 TABLET | Refills: 0 | Status: SHIPPED | OUTPATIENT
Start: 2019-09-30 | End: 2019-10-15 | Stop reason: HOSPADM

## 2019-09-30 RX ADMIN — CIPROFLOXACIN HYDROCHLORIDE 500 MG: 500 TABLET, FILM COATED ORAL at 22:05

## 2019-09-30 NOTE — ED PROVIDER NOTES
History  Chief Complaint   Patient presents with    Hypertension     Pt cp,omg from [facility] with c/o HTN and vaginal irriation   Vaginal Pain     69year old female with progressive dementia presents emergency department for evaluation of hypertension and vaginal burning  The patient is not able to participate in her history and physical   The patient states nothing is bothering her currently  Patient had basic blood work done a urinalysis done  History provided by:  Patient   used: No    Medical Problem   Severity:  Mild  Onset quality:  Gradual  Timing:  Constant  Progression:  Worsening  Chronicity:  Recurrent  Associated symptoms: no abdominal pain, no cough, no ear pain, no headaches, no nausea, no rhinorrhea and no wheezing        Prior to Admission Medications   Prescriptions Last Dose Informant Patient Reported? Taking?    Cholecalciferol (VITAMIN D) 2000 units CAPS  Outside Facility (Specify) No No   Sig: Take 1 capsule (2,000 Units total) by mouth daily   Citric Ee-Fciaujsllrs-Fb Carb (RENACIDIN) SOLN  Outside Facility (Specify) Yes No   Sig: Indications: 60ml to irrigate baca once a week on Friday   Sennosides-Docusate Sodium (SENNA PLUS PO)  Outside Facility (Specify) Yes No   Sig: Take by mouth daily as needed   acetaminophen (TYLENOL) 500 mg tablet  Outside Facility (Specify) Yes No   Sig: Take 500 mg by mouth every 6 (six) hours as needed for mild pain   albuterol (2 5 mg/3 mL) 0 083 % nebulizer solution  Outside Facility (Specify) No No   Sig: Take 1 vial (2 5 mg total) by nebulization every 6 (six) hours as needed for wheezing   albuterol (PROVENTIL HFA,VENTOLIN HFA) 90 mcg/act inhaler  Outside Facility (Specify) No No   Sig: Inhale 2 puffs every 6 (six) hours as needed for wheezing   allopurinol (ZYLOPRIM) 300 mg tablet  Outside Facility (Specify) No No   Sig: Take 1 tablet (300 mg total) by mouth every morning for 14 days   atorvastatin (LIPITOR) 10 mg tablet  Outside Facility (Specify) No No   Sig: Take 1 tablet (10 mg total) by mouth daily   betamethasone dipropionate (DIPROSONE) 0 05 % cream  Outside Facility (Specify) Yes No   Sig: Apply topically 3 (three) times a day   buPROPion (WELLBUTRIN SR) 150 mg 12 hr tablet  Outside Facility (Specify) No No   Sig: Take 1 tablet (150 mg total) by mouth daily   carBAMazepine (TEGretol) 100 mg chewable tablet  Outside Facility (Specify) No No   Sig: Chew 1 tablet (100 mg total) 3 (three) times a day   clopidogrel (PLAVIX) 75 mg tablet  Outside Facility (Specify) No No   Sig: Take 1 tablet (75 mg total) by mouth daily   clotrimazole (LOTRIMIN) 1 % cream  Outside Facility (Specify) Yes No   Sig: Apply topically 3 (three) times a day   diltiazem (CARDIZEM) 30 mg tablet   No No   Sig: Take 1 tablet (30 mg total) by mouth 3 (three) times a day   divalproex sodium (DEPAKOTE ER) 250 mg 24 hr tablet   No No   Sig: Take 1 tablet (250 mg total) by mouth 2 (two) times a day   fenofibrate micronized (LOFIBRA) 67 MG capsule  Outside Facility (Specify) No No   Sig: Take 1 capsule (67 mg total) by mouth daily with breakfast   ferrous sulfate 325 (65 Fe) mg tablet  Outside Facility (Specify) No No   Sig: Take 1 tablet (325 mg total) by mouth 2 (two) times a day   fluticasone-vilanterol (BREO ELLIPTA) 100-25 mcg/inh inhaler  Outside Facility (Specify) No No   Sig: Inhale 1 puff daily   furosemide (LASIX) 40 mg tablet  Outside Facility (Specify) No No   Sig: Take 1 tablet (40 mg total) by mouth daily   guaiFENesin (ROBITUSSIN) 100 mg/5 mL oral solution   No No   Sig: Take 10 mL (200 mg total) by mouth every 4 (four) hours as needed (cough)   Patient not taking: Reported on 7/11/2019   levothyroxine 100 mcg tablet   Yes No   Sig: Take 100 mcg by mouth daily   levothyroxine 88 mcg tablet  Outside Facility (Specify) No No   Sig: Take 1 tablet (88 mcg total) by mouth daily   metoprolol tartrate (LOPRESSOR) 50 mg tablet  Outside Facility (Specify) No No   Sig: Take 1 tablet (50 mg total) by mouth every 12 (twelve) hours   nitroglycerin (NITROSTAT) 0 4 mg SL tablet  Outside Facility (Specify) No No   Sig: Place 1 tablet (0 4 mg total) under the tongue every 5 (five) minutes as needed for chest pain   potassium chloride (K-DUR,KLOR-CON) 20 mEq tablet  Outside Facility (Specify) No No   Sig: Take 1 tablet (20 mEq total) by mouth daily   saccharomyces boulardii (FLORASTOR) 250 mg capsule   No No   Sig: Take 1 capsule (250 mg total) by mouth 2 (two) times a day   warfarin (COUMADIN) 3 mg tablet  Outside Facility (Specify) No No   Sig: One tablet daily or as otherwise directed     warfarin (COUMADIN) 4 mg tablet   Yes No   Sig: Take by mouth daily      Facility-Administered Medications: None       Past Medical History:   Diagnosis Date    Atrial fibrillation ([facility] )     Brain aneurysm     CAD (coronary artery disease)     Cardiac disease     had stents 12 years ago in [address] [place] [address] CHF (congestive heart failure) ([facility] )     CKD (chronic kidney disease)     [facility]     Dementia     Dementia     Disease of thyroid gland     GERD (gastroesophageal reflux disease)     Hyperlipidemia     Hyperlipidemia     Hypertension     MI (myocardial infarction) ([facility] )     Migraine     Renal disorder     Seizures ([facility] )     Stroke [facility])        Past Surgical History:   Procedure Laterality Date    APPENDECTOMY      ARTERIOGRAM N/A 12/7/2018    Procedure: ARTERIOGRAM WITH STENT PLACEMENT;  Surgeon: Daisy Reddy MD;  [facility]: [facility];  Service: Vascular    BLADDER TUMOR EXCISION      BRAIN SURGERY      1998  clip right frontal lobe    CARDIAC SURGERY      COLONOSCOPY      CORONARY STENT PLACEMENT      5 stents    EYE SURGERY      IR ABDOMINAL ANGIOGRAPHY / INTERVENTION  12/7/2018    MANDIBLE FRACTURE SURGERY      TONSILLECTOMY      TUBAL LIGATION      UTERINE FIBROID SURGERY         Family History Problem Relation Age of Onset    Heart disease Father     Parkinsonism Father     Macular degeneration Mother     Glaucoma Mother     Diabetes Brother     Heart disease Brother      I have reviewed and agree with the history as documented  Social History     Tobacco Use    Smoking status: Former Smoker     Packs/day: 5 00     Years: 40 00     Pack years: 200 00     Last attempt to quit: 2007     Years since quittin 0    Smokeless tobacco: Never Used   Substance Use Topics    Alcohol use: No     Alcohol/week: 0 0 standard drinks    Drug use: No        Review of Systems   HENT: Negative for ear pain and rhinorrhea  Respiratory: Negative for cough and wheezing  Gastrointestinal: Negative for abdominal pain and nausea  Neurological: Negative for headaches  All other systems reviewed and are negative  Physical Exam  Physical Exam   Constitutional: She is oriented to person, place, and time  She appears well-developed and well-nourished  HENT:   Head: Normocephalic and atraumatic  Right Ear: External ear normal    Left Ear: External ear normal    Eyes: Conjunctivae and EOM are normal    Neck: No JVD present  No tracheal deviation present  No thyromegaly present  Cardiovascular: Normal rate  Pulmonary/Chest: Effort normal and breath sounds normal  No stridor  Abdominal: Soft  She exhibits no distension and no mass  There is no tenderness  There is no guarding  No hernia  Musculoskeletal: Normal range of motion  She exhibits no edema, tenderness or deformity  Lymphadenopathy:     She has no cervical adenopathy  Neurological: She is alert and oriented to person, place, and time  Skin: Skin is warm  No rash noted  No erythema  No pallor  Psychiatric: She has a normal mood and affect  Her behavior is normal    Nursing note and vitals reviewed        Vital Signs  ED Triage Vitals   Temperature Pulse Respirations Blood Pressure SpO2   19 1853 19 1850 19 1850 09/30/19 1850 09/30/19 1850   99 °F (37 2 °C) (!) 118 18 (!) 176/101 92 %      Temp src Heart Rate Source Patient Position - Orthostatic VS BP Location FiO2 (%)   -- 09/30/19 1850 09/30/19 1850 09/30/19 1850 --    Monitor Lying Right arm       Pain Score       09/30/19 1850       3           Vitals:    09/30/19 1850 09/30/19 2056   BP: (!) 176/101 157/77   Pulse: (!) 118 (!) 118   Patient Position - Orthostatic VS: Lying Sitting         Visual Acuity      ED Medications  Medications   ciprofloxacin (CIPRO) tablet 500 mg (500 mg Oral Given 9/30/19 2205)       Diagnostic Studies  Results Reviewed     Procedure Component Value Units Date/Time    Urine culture [[id number]] Collected:  09/30/19 1952    Lab Status:  Preliminary result Specimen:  Urine, Clean Catch Updated:  10/01/19 1647     Urine Culture Culture results to follow      Comprehensive metabolic panel [[id number]]  (Abnormal) Collected:  09/30/19 2008    Lab Status:  Final result Specimen:  Blood from Arm, Right Updated:  09/30/19 2039     Sodium 147 mmol/L      Potassium 4 3 mmol/L      Chloride 110 mmol/L      CO2 30 mmol/L      ANION GAP 7 mmol/L      BUN 33 mg/dL      Creatinine 1 46 mg/dL      Glucose 98 mg/dL      Calcium 9 3 mg/dL      AST 22 U/L      ALT 19 U/L      Alkaline Phosphatase 57 U/L      Total Protein 7 3 g/dL      Albumin 3 4 g/dL      Total Bilirubin 0 20 mg/dL      eGFR 35 ml/min/1 73sq m     Narrative:       Justine guidelines for Chronic Kidney Disease (CKD):     Stage 1 with normal or high GFR (GFR > 90 mL/min/1 73 square meters)    Stage 2 Mild CKD (GFR = 60-89 mL/min/1 73 square meters)    Stage 3A Moderate CKD (GFR = 45-59 mL/min/1 73 square meters)    Stage 3B Moderate CKD (GFR = 30-44 mL/min/1 73 square meters)    Stage 4 Severe CKD (GFR = 15-29 mL/min/1 73 square meters)    Stage 5 End Stage CKD (GFR <15 mL/min/1 73 square meters)  Note: GFR calculation is accurate only with a steady state creatinine    Magnesium [[id number]]  (Normal) Collected:  09/30/19 2008    Lab Status:  Final result Specimen:  Blood from Arm, Right Updated:  09/30/19 2039     Magnesium 2 2 mg/dL     Troponin I [[id number]]  (Normal) Collected:  09/30/19 2008    Lab Status:  Final result Specimen:  Blood from Arm, Right Updated:  09/30/19 2037     Troponin I <0 02 ng/mL     CBC and differential [[id number]]  (Abnormal) Collected:  09/30/19 2008    Lab Status:  Final result Specimen:  Blood from Arm, Right Updated:  09/30/19 2018     WBC 5 74 Thousand/uL      RBC 3 70 Million/uL      Hemoglobin 12 7 g/dL      Hematocrit 39 8 %       fL      MCH 34 3 pg      MCHC 31 9 g/dL      RDW 13 0 %      MPV 11 8 fL      Platelets 543 Thousands/uL      nRBC 0 /100 WBCs      Neutrophils Relative 50 %      Immat GRANS % 0 %      Lymphocytes Relative 35 %      Monocytes Relative 10 %      Eosinophils Relative 4 %      Basophils Relative 1 %      Neutrophils Absolute 2 83 Thousands/µL      Immature Grans Absolute 0 02 Thousand/uL      Lymphocytes Absolute 2 01 Thousands/µL      Monocytes Absolute 0 59 Thousand/µL      Eosinophils Absolute 0 25 Thousand/µL      Basophils Absolute 0 04 Thousands/µL     Urine Microscopic [[id number]]  (Abnormal) Collected:  09/30/19 1952    Lab Status:  Final result Specimen:  Urine, Clean Catch Updated:  09/30/19 2011     RBC, UA 10-20 /hpf      WBC, UA 10-20 /hpf      Epithelial Cells Occasional /hpf      Bacteria, UA Moderate /hpf     UA w Reflex to Microscopic w Reflex to Culture [[id number]]  (Abnormal) Collected:  09/30/19 1952    Lab Status:  Final result Specimen:  Urine, Clean Catch Updated:  09/30/19 2003     Color, UA Yellow     Clarity, UA Clear     Specific [place], UA 1 020     pH, UA 6 0     Leukocytes, UA Moderate     Nitrite, UA Positive     Protein, UA Trace mg/dl      Glucose, UA Negative mg/dl      Ketones, UA Negative mg/dl      Urobilinogen, UA 0 2 E U /dl      Bilirubin, UA Negative Blood, UA Moderate                 No orders to display              Procedures  Procedures       ED Course                               MDM  Number of Diagnoses or Management Options  UTI (urinary tract infection): new and requires workup     Amount and/or Complexity of Data Reviewed  Clinical lab tests: ordered and reviewed  Tests in the radiology section of CPT®: reviewed and ordered  Decide to obtain previous medical records or to obtain history from someone other than the patient: yes  Review and summarize past medical records: yes    Patient Progress  Patient progress: stable      Disposition  Final diagnoses:   UTI (urinary tract infection)     Time reflects when diagnosis was documented in both MDM as applicable and the Disposition within this note     Time User Action Codes Description Comment    9/30/2019  9:58 PM Ryan Dobbs Add [N39 0] UTI (urinary tract infection)       ED Disposition     ED Disposition Condition Date/Time Comment    Discharge Stable Mon Sep 30, 2019  9:57 PM Faraz Macdonald discharge to home/self care              Follow-up Information     Follow up With Specialties Details Why [id number] Tomasa Reis, DO Internal Medicine   [address]  [phone]            Discharge Medication List as of 9/30/2019  9:58 PM      START taking these medications    Details   ciprofloxacin (CIPRO) 500 mg tablet Take 1 tablet (500 mg total) by mouth 2 (two) times a day for 10 days, Starting Mon 9/30/2019, Until Thu 10/10/2019, Print         CONTINUE these medications which have NOT CHANGED    Details   acetaminophen (TYLENOL) 500 mg tablet Take 500 mg by mouth every 6 (six) hours as needed for mild pain, Historical Med      albuterol (2 5 mg/3 mL) 0 083 % nebulizer solution Take 1 vial (2 5 mg total) by nebulization every 6 (six) hours as needed for wheezing, Starting Fri 12/14/2018, Print      albuterol (PROVENTIL HFA,VENTOLIN HFA) 90 mcg/act inhaler Inhale 2 puffs every 6 (six) hours as needed for wheezing, Starting Fri 12/14/2018, Print      allopurinol (ZYLOPRIM) 300 mg tablet Take 1 tablet (300 mg total) by mouth every morning for 14 days, Starting Fri 12/14/2018, Until Mon 1/7/2019, Print      atorvastatin (LIPITOR) 10 mg tablet Take 1 tablet (10 mg total) by mouth daily, Starting Fri 12/14/2018, Print      betamethasone dipropionate (DIPROSONE) 0 05 % cream Apply topically 3 (three) times a day, Historical Med      buPROPion (WELLBUTRIN SR) 150 mg 12 hr tablet Take 1 tablet (150 mg total) by mouth daily, Starting Fri 12/14/2018, Print      carBAMazepine (TEGretol) 100 mg chewable tablet Chew 1 tablet (100 mg total) 3 (three) times a day, Starting Fri 12/14/2018, Print      Cholecalciferol (VITAMIN D) 2000 units CAPS Take 1 capsule (2,000 Units total) by mouth daily, Starting Fri 12/14/2018, Print      Citric Le-Gsarsyzbixz-Zn Carb (RENACIDIN) SOLN Indications: 60ml to irrigate baca once a week on Friday, Historical Med      clopidogrel (PLAVIX) 75 mg tablet Take 1 tablet (75 mg total) by mouth daily, Starting Fri 12/14/2018, Print      clotrimazole (LOTRIMIN) 1 % cream Apply topically 3 (three) times a day, Historical Med      diltiazem (CARDIZEM) 30 mg tablet Take 1 tablet (30 mg total) by mouth 3 (three) times a day, Starting Thu 7/11/2019, Normal      divalproex sodium (DEPAKOTE ER) 250 mg 24 hr tablet Take 1 tablet (250 mg total) by mouth 2 (two) times a day, Starting Wed 1/16/2019, No Print      fenofibrate micronized (LOFIBRA) 67 MG capsule Take 1 capsule (67 mg total) by mouth daily with breakfast, Starting Fri 12/14/2018, Print      ferrous sulfate 325 (65 Fe) mg tablet Take 1 tablet (325 mg total) by mouth 2 (two) times a day, Starting Fri 12/14/2018, Print      fluticasone-vilanterol (BREO ELLIPTA) 100-25 mcg/inh inhaler Inhale 1 puff daily, Starting Fri 12/14/2018, Print      furosemide (LASIX) 40 mg tablet Take 1 tablet (40 mg total) by mouth daily, Starting Thu 12/27/2018, Normal      guaiFENesin (ROBITUSSIN) 100 mg/5 mL oral solution Take 10 mL (200 mg total) by mouth every 4 (four) hours as needed (cough), Starting Sat 1/26/2019, Normal      !! levothyroxine 100 mcg tablet Take 100 mcg by mouth daily, Historical Med      !! levothyroxine 88 mcg tablet Take 1 tablet (88 mcg total) by mouth daily, Starting Fri 12/14/2018, Print      metoprolol tartrate (LOPRESSOR) 50 mg tablet Take 1 tablet (50 mg total) by mouth every 12 (twelve) hours, Starting Fri 12/14/2018, Print      nitroglycerin (NITROSTAT) 0 4 mg SL tablet Place 1 tablet (0 4 mg total) under the tongue every 5 (five) minutes as needed for chest pain, Starting Sat 5/12/2018, Normal      potassium chloride (K-DUR,KLOR-CON) 20 mEq tablet Take 1 tablet (20 mEq total) by mouth daily, Starting Fri 12/14/2018, Print      saccharomyces boulardii (FLORASTOR) 250 mg capsule Take 1 capsule (250 mg total) by mouth 2 (two) times a day, Starting Wed 1/16/2019, No Print      Sennosides-Docusate Sodium (SENNA PLUS PO) Take by mouth daily as needed, Historical Med      !! warfarin (COUMADIN) 3 mg tablet One tablet daily or as otherwise directed  , Print      !! warfarin (COUMADIN) 4 mg tablet Take by mouth daily, Historical Med       !! - Potential duplicate medications found  Please discuss with provider  No discharge procedures on file      ED Provider  Electronically Signed by           Lisa Soares DO  10/02/19 [id number]

## 2019-10-02 LAB
BACTERIA UR CULT: ABNORMAL
BACTERIA UR CULT: ABNORMAL

## 2019-10-03 ENCOUNTER — TELEPHONE (OUTPATIENT)
Dept: CARDIOLOGY CLINIC | Facility: CLINIC | Age: 74
End: 2019-10-03

## 2019-10-03 ENCOUNTER — HOSPITAL ENCOUNTER (INPATIENT)
Facility: HOSPITAL | Age: 74
LOS: 12 days | Discharge: NURSING, REHAB OR LONG-TERM CARE | DRG: 286 | End: 2019-10-15
Attending: EMERGENCY MEDICINE | Admitting: INTERNAL MEDICINE
Payer: MEDICARE

## 2019-10-03 ENCOUNTER — APPOINTMENT (EMERGENCY)
Dept: RADIOLOGY | Facility: HOSPITAL | Age: 74
DRG: 286 | End: 2019-10-03
Payer: MEDICARE

## 2019-10-03 DIAGNOSIS — I50.9 ACUTE CHF (CONGESTIVE HEART FAILURE) (HCC): Primary | ICD-10-CM

## 2019-10-03 DIAGNOSIS — N18.30 STAGE 3 CHRONIC KIDNEY DISEASE (HCC): Chronic | ICD-10-CM

## 2019-10-03 DIAGNOSIS — I48.92 ATRIAL FLUTTER (HCC): ICD-10-CM

## 2019-10-03 DIAGNOSIS — I50.33 ACUTE ON CHRONIC DIASTOLIC CONGESTIVE HEART FAILURE (HCC): ICD-10-CM

## 2019-10-03 DIAGNOSIS — I69.398 MOOD DISORDER AS LATE EFFECT OF CEREBROVASCULAR ACCIDENT (CVA): ICD-10-CM

## 2019-10-03 DIAGNOSIS — F02.81 DEMENTIA ASSOCIATED WITH OTHER UNDERLYING DISEASE WITH BEHAVIORAL DISTURBANCE (HCC): ICD-10-CM

## 2019-10-03 DIAGNOSIS — F06.30 MOOD DISORDER AS LATE EFFECT OF CEREBROVASCULAR ACCIDENT (CVA): ICD-10-CM

## 2019-10-03 PROBLEM — Z86.79 HISTORY OF CEREBRAL ANEURYSM REPAIR: Status: ACTIVE | Noted: 2019-10-03

## 2019-10-03 PROBLEM — R33.9 URINARY RETENTION: Status: ACTIVE | Noted: 2019-10-03

## 2019-10-03 PROBLEM — Z98.890 HISTORY OF CEREBRAL ANEURYSM REPAIR: Status: ACTIVE | Noted: 2019-10-03

## 2019-10-03 LAB
ALBUMIN SERPL BCP-MCNC: 3.4 G/DL (ref 3.5–5)
ALP SERPL-CCNC: 61 U/L (ref 46–116)
ALT SERPL W P-5'-P-CCNC: 34 U/L (ref 12–78)
ANION GAP SERPL CALCULATED.3IONS-SCNC: 11 MMOL/L (ref 4–13)
APTT PPP: 41 SECONDS (ref 23–37)
AST SERPL W P-5'-P-CCNC: 32 U/L (ref 5–45)
BASOPHILS # BLD AUTO: 0.02 THOUSANDS/ΜL (ref 0–0.1)
BASOPHILS NFR BLD AUTO: 0 % (ref 0–1)
BILIRUB SERPL-MCNC: 0.3 MG/DL (ref 0.2–1)
BUN SERPL-MCNC: 28 MG/DL (ref 5–25)
CALCIUM SERPL-MCNC: 9.1 MG/DL (ref 8.3–10.1)
CHLORIDE SERPL-SCNC: 109 MMOL/L (ref 100–108)
CO2 SERPL-SCNC: 27 MMOL/L (ref 21–32)
CREAT SERPL-MCNC: 1.61 MG/DL (ref 0.6–1.3)
EOSINOPHIL # BLD AUTO: 0.15 THOUSAND/ΜL (ref 0–0.61)
EOSINOPHIL NFR BLD AUTO: 2 % (ref 0–6)
ERYTHROCYTE [DISTWIDTH] IN BLOOD BY AUTOMATED COUNT: 13.1 % (ref 11.6–15.1)
GFR SERPL CREATININE-BSD FRML MDRD: 32 ML/MIN/1.73SQ M
GLUCOSE SERPL-MCNC: 162 MG/DL (ref 65–140)
HCT VFR BLD AUTO: 38.8 % (ref 34.8–46.1)
HGB BLD-MCNC: 12.4 G/DL (ref 11.5–15.4)
IMM GRANULOCYTES # BLD AUTO: 0.04 THOUSAND/UL (ref 0–0.2)
IMM GRANULOCYTES NFR BLD AUTO: 1 % (ref 0–2)
INR PPP: 2.92 (ref 0.84–1.19)
LYMPHOCYTES # BLD AUTO: 1.57 THOUSANDS/ΜL (ref 0.6–4.47)
LYMPHOCYTES NFR BLD AUTO: 23 % (ref 14–44)
MAGNESIUM SERPL-MCNC: 1.9 MG/DL (ref 1.6–2.6)
MCH RBC QN AUTO: 34.1 PG (ref 26.8–34.3)
MCHC RBC AUTO-ENTMCNC: 32 G/DL (ref 31.4–37.4)
MCV RBC AUTO: 107 FL (ref 82–98)
MONOCYTES # BLD AUTO: 0.72 THOUSAND/ΜL (ref 0.17–1.22)
MONOCYTES NFR BLD AUTO: 11 % (ref 4–12)
NEUTROPHILS # BLD AUTO: 4.36 THOUSANDS/ΜL (ref 1.85–7.62)
NEUTS SEG NFR BLD AUTO: 63 % (ref 43–75)
NRBC BLD AUTO-RTO: 0 /100 WBCS
NT-PROBNP SERPL-MCNC: ABNORMAL PG/ML
PLATELET # BLD AUTO: 171 THOUSANDS/UL (ref 149–390)
PMV BLD AUTO: 12.3 FL (ref 8.9–12.7)
POTASSIUM SERPL-SCNC: 3.8 MMOL/L (ref 3.5–5.3)
PROT SERPL-MCNC: 7.3 G/DL (ref 6.4–8.2)
PROTHROMBIN TIME: 30.9 SECONDS (ref 11.6–14.5)
RBC # BLD AUTO: 3.64 MILLION/UL (ref 3.81–5.12)
SODIUM SERPL-SCNC: 147 MMOL/L (ref 136–145)
TROPONIN I SERPL-MCNC: <0.02 NG/ML
TSH SERPL DL<=0.05 MIU/L-ACNC: 2.35 UIU/ML (ref 0.36–3.74)
WBC # BLD AUTO: 6.86 THOUSAND/UL (ref 4.31–10.16)

## 2019-10-03 PROCEDURE — 93005 ELECTROCARDIOGRAM TRACING: CPT

## 2019-10-03 PROCEDURE — 96375 TX/PRO/DX INJ NEW DRUG ADDON: CPT

## 2019-10-03 PROCEDURE — 96374 THER/PROPH/DIAG INJ IV PUSH: CPT

## 2019-10-03 PROCEDURE — 85730 THROMBOPLASTIN TIME PARTIAL: CPT | Performed by: PHYSICIAN ASSISTANT

## 2019-10-03 PROCEDURE — 84484 ASSAY OF TROPONIN QUANT: CPT | Performed by: PHYSICIAN ASSISTANT

## 2019-10-03 PROCEDURE — 80053 COMPREHEN METABOLIC PANEL: CPT | Performed by: PHYSICIAN ASSISTANT

## 2019-10-03 PROCEDURE — 36415 COLL VENOUS BLD VENIPUNCTURE: CPT | Performed by: PHYSICIAN ASSISTANT

## 2019-10-03 PROCEDURE — 99285 EMERGENCY DEPT VISIT HI MDM: CPT | Performed by: PHYSICIAN ASSISTANT

## 2019-10-03 PROCEDURE — 99285 EMERGENCY DEPT VISIT HI MDM: CPT

## 2019-10-03 PROCEDURE — 71046 X-RAY EXAM CHEST 2 VIEWS: CPT

## 2019-10-03 PROCEDURE — 84443 ASSAY THYROID STIM HORMONE: CPT | Performed by: PHYSICIAN ASSISTANT

## 2019-10-03 PROCEDURE — 83735 ASSAY OF MAGNESIUM: CPT | Performed by: PHYSICIAN ASSISTANT

## 2019-10-03 PROCEDURE — 83880 ASSAY OF NATRIURETIC PEPTIDE: CPT | Performed by: PHYSICIAN ASSISTANT

## 2019-10-03 PROCEDURE — 85025 COMPLETE CBC W/AUTO DIFF WBC: CPT | Performed by: PHYSICIAN ASSISTANT

## 2019-10-03 PROCEDURE — 99223 1ST HOSP IP/OBS HIGH 75: CPT | Performed by: INTERNAL MEDICINE

## 2019-10-03 PROCEDURE — 85610 PROTHROMBIN TIME: CPT | Performed by: PHYSICIAN ASSISTANT

## 2019-10-03 RX ORDER — ACETAMINOPHEN 325 MG/1
650 TABLET ORAL EVERY 6 HOURS PRN
Status: DISCONTINUED | OUTPATIENT
Start: 2019-10-03 | End: 2019-10-15 | Stop reason: HOSPADM

## 2019-10-03 RX ORDER — FUROSEMIDE 10 MG/ML
60 INJECTION INTRAMUSCULAR; INTRAVENOUS ONCE
Status: COMPLETED | OUTPATIENT
Start: 2019-10-03 | End: 2019-10-03

## 2019-10-03 RX ORDER — LORAZEPAM 0.5 MG/1
0.5 TABLET ORAL 2 TIMES DAILY
COMMUNITY
End: 2019-10-15 | Stop reason: SDUPTHER

## 2019-10-03 RX ORDER — METOPROLOL TARTRATE 50 MG/1
50 TABLET, FILM COATED ORAL EVERY 12 HOURS SCHEDULED
Status: DISCONTINUED | OUTPATIENT
Start: 2019-10-03 | End: 2019-10-04

## 2019-10-03 RX ORDER — CARBAMAZEPINE 100 MG/1
100 TABLET, CHEWABLE ORAL 3 TIMES DAILY
Status: DISCONTINUED | OUTPATIENT
Start: 2019-10-03 | End: 2019-10-15 | Stop reason: HOSPADM

## 2019-10-03 RX ORDER — DILTIAZEM HYDROCHLORIDE 90 MG/1
90 CAPSULE, EXTENDED RELEASE ORAL EVERY 12 HOURS SCHEDULED
Status: DISCONTINUED | OUTPATIENT
Start: 2019-10-03 | End: 2019-10-04

## 2019-10-03 RX ORDER — FLUTICASONE FUROATE AND VILANTEROL 100; 25 UG/1; UG/1
1 POWDER RESPIRATORY (INHALATION) DAILY
Status: DISCONTINUED | OUTPATIENT
Start: 2019-10-04 | End: 2019-10-15 | Stop reason: HOSPADM

## 2019-10-03 RX ORDER — LORAZEPAM 0.5 MG/1
0.5 TABLET ORAL 2 TIMES DAILY
Status: DISCONTINUED | OUTPATIENT
Start: 2019-10-03 | End: 2019-10-15 | Stop reason: HOSPADM

## 2019-10-03 RX ORDER — LEVOTHYROXINE SODIUM 88 UG/1
88 TABLET ORAL DAILY
Status: DISCONTINUED | OUTPATIENT
Start: 2019-10-04 | End: 2019-10-15 | Stop reason: HOSPADM

## 2019-10-03 RX ORDER — AMOXICILLIN 250 MG
1 CAPSULE ORAL DAILY PRN
Status: DISCONTINUED | OUTPATIENT
Start: 2019-10-03 | End: 2019-10-12

## 2019-10-03 RX ORDER — LEVALBUTEROL 1.25 MG/.5ML
1.25 SOLUTION, CONCENTRATE RESPIRATORY (INHALATION) EVERY 6 HOURS PRN
Status: DISCONTINUED | OUTPATIENT
Start: 2019-10-03 | End: 2019-10-03

## 2019-10-03 RX ORDER — POTASSIUM CHLORIDE 20 MEQ/1
40 TABLET, EXTENDED RELEASE ORAL ONCE
Status: COMPLETED | OUTPATIENT
Start: 2019-10-03 | End: 2019-10-03

## 2019-10-03 RX ORDER — ATORVASTATIN CALCIUM 10 MG/1
10 TABLET, FILM COATED ORAL DAILY
Status: DISCONTINUED | OUTPATIENT
Start: 2019-10-04 | End: 2019-10-15 | Stop reason: HOSPADM

## 2019-10-03 RX ORDER — BUPROPION HYDROCHLORIDE 150 MG/1
150 TABLET, EXTENDED RELEASE ORAL DAILY
Status: DISCONTINUED | OUTPATIENT
Start: 2019-10-04 | End: 2019-10-15 | Stop reason: HOSPADM

## 2019-10-03 RX ORDER — MELATONIN
1000 DAILY
Status: DISCONTINUED | OUTPATIENT
Start: 2019-10-04 | End: 2019-10-15 | Stop reason: HOSPADM

## 2019-10-03 RX ORDER — FERROUS SULFATE 325(65) MG
325 TABLET ORAL 2 TIMES DAILY
Status: DISCONTINUED | OUTPATIENT
Start: 2019-10-03 | End: 2019-10-15 | Stop reason: HOSPADM

## 2019-10-03 RX ORDER — ONDANSETRON 2 MG/ML
4 INJECTION INTRAMUSCULAR; INTRAVENOUS EVERY 4 HOURS PRN
Status: DISCONTINUED | OUTPATIENT
Start: 2019-10-03 | End: 2019-10-15 | Stop reason: HOSPADM

## 2019-10-03 RX ORDER — FUROSEMIDE 10 MG/ML
40 INJECTION INTRAMUSCULAR; INTRAVENOUS
Status: DISCONTINUED | OUTPATIENT
Start: 2019-10-04 | End: 2019-10-05

## 2019-10-03 RX ORDER — CLOPIDOGREL BISULFATE 75 MG/1
75 TABLET ORAL DAILY
Status: DISCONTINUED | OUTPATIENT
Start: 2019-10-04 | End: 2019-10-15 | Stop reason: HOSPADM

## 2019-10-03 RX ORDER — WARFARIN SODIUM 3 MG/1
3 TABLET ORAL
Status: DISCONTINUED | OUTPATIENT
Start: 2019-10-03 | End: 2019-10-06

## 2019-10-03 RX ORDER — SACCHAROMYCES BOULARDII 250 MG
250 CAPSULE ORAL 2 TIMES DAILY
Status: DISCONTINUED | OUTPATIENT
Start: 2019-10-03 | End: 2019-10-15 | Stop reason: HOSPADM

## 2019-10-03 RX ORDER — ALBUTEROL SULFATE 90 UG/1
2 AEROSOL, METERED RESPIRATORY (INHALATION) EVERY 6 HOURS PRN
Status: DISCONTINUED | OUTPATIENT
Start: 2019-10-03 | End: 2019-10-15 | Stop reason: HOSPADM

## 2019-10-03 RX ORDER — BUTALBITAL, ACETAMINOPHEN AND CAFFEINE 50; 325; 40 MG/1; MG/1; MG/1
1 TABLET ORAL EVERY 4 HOURS PRN
Status: DISCONTINUED | OUTPATIENT
Start: 2019-10-03 | End: 2019-10-15 | Stop reason: HOSPADM

## 2019-10-03 RX ORDER — DILTIAZEM HYDROCHLORIDE 5 MG/ML
10 INJECTION INTRAVENOUS EVERY 4 HOURS PRN
Status: DISCONTINUED | OUTPATIENT
Start: 2019-10-03 | End: 2019-10-15 | Stop reason: HOSPADM

## 2019-10-03 RX ORDER — DIVALPROEX SODIUM 250 MG/1
250 TABLET, EXTENDED RELEASE ORAL 2 TIMES DAILY
Status: DISCONTINUED | OUTPATIENT
Start: 2019-10-03 | End: 2019-10-15 | Stop reason: HOSPADM

## 2019-10-03 RX ORDER — METOPROLOL TARTRATE 5 MG/5ML
5 INJECTION INTRAVENOUS ONCE
Status: COMPLETED | OUTPATIENT
Start: 2019-10-03 | End: 2019-10-03

## 2019-10-03 RX ORDER — BUTALBITAL, ACETAMINOPHEN AND CAFFEINE 50; 325; 40 MG/1; MG/1; MG/1
1 TABLET ORAL ONCE
Status: COMPLETED | OUTPATIENT
Start: 2019-10-03 | End: 2019-10-03

## 2019-10-03 RX ORDER — POTASSIUM CHLORIDE 20 MEQ/1
20 TABLET, EXTENDED RELEASE ORAL DAILY
Status: DISCONTINUED | OUTPATIENT
Start: 2019-10-04 | End: 2019-10-07

## 2019-10-03 RX ORDER — DILTIAZEM HYDROCHLORIDE 5 MG/ML
0.25 INJECTION INTRAVENOUS ONCE
Status: COMPLETED | OUTPATIENT
Start: 2019-10-03 | End: 2019-10-03

## 2019-10-03 RX ADMIN — Medication 250 MG: at 22:57

## 2019-10-03 RX ADMIN — FERROUS SULFATE TAB 325 MG (65 MG ELEMENTAL FE) 325 MG: 325 (65 FE) TAB at 22:57

## 2019-10-03 RX ADMIN — POTASSIUM CHLORIDE 40 MEQ: 1500 TABLET, EXTENDED RELEASE ORAL at 17:40

## 2019-10-03 RX ADMIN — DILTIAZEM HYDROCHLORIDE 90 MG: 90 CAPSULE, EXTENDED RELEASE ORAL at 23:00

## 2019-10-03 RX ADMIN — METOPROLOL TARTRATE 5 MG: 1 INJECTION, SOLUTION INTRAVENOUS at 17:40

## 2019-10-03 RX ADMIN — CARBAMAZEPINE 100 MG: 100 TABLET, CHEWABLE ORAL at 22:56

## 2019-10-03 RX ADMIN — DIVALPROEX SODIUM 250 MG: 250 TABLET, FILM COATED, EXTENDED RELEASE ORAL at 22:56

## 2019-10-03 RX ADMIN — DILTIAZEM HYDROCHLORIDE 22.5 MG: 5 INJECTION INTRAVENOUS at 18:26

## 2019-10-03 RX ADMIN — FUROSEMIDE 60 MG: 10 INJECTION, SOLUTION INTRAVENOUS at 17:40

## 2019-10-03 RX ADMIN — BUTALBITAL, ACETAMINOPHEN, AND CAFFEINE 1 TABLET: 50; 325; 40 TABLET ORAL at 20:37

## 2019-10-03 RX ADMIN — WARFARIN SODIUM 3 MG: 3 TABLET ORAL at 22:58

## 2019-10-03 RX ADMIN — LORAZEPAM 0.5 MG: 0.5 TABLET ORAL at 22:56

## 2019-10-03 RX ADMIN — METOPROLOL TARTRATE 50 MG: 50 TABLET, FILM COATED ORAL at 22:57

## 2019-10-03 NOTE — TELEPHONE ENCOUNTER
Pt's Daughter called back and said that her mother is saying she is feeling much worse  At one of her appointments today the doctor told her she was not in AFIB  I told her she is to report to ED

## 2019-10-03 NOTE — H&P
H&P- Trini Organ [id number], 69 y o  female MRN: [id number]  Unit/Bed#: ED 12 Encounter: [id number]  Primary Care Provider: Cande De La O DO   Date and time admitted to hospital: 10/3/2019  3:21 PM        Assessment and Plan  * Acute on chronic diastolic congestive heart failure [facility])  Assessment & Plan  Wt Readings from Last 3 Encounters:   09/30/19 88 3 kg (194 lb 10 7 oz)   07/18/19 83 5 kg (184 lb)   07/11/19 83 kg (183 lb)     Acute on chronic diastolic congestive heart failure; last echocardiogram December 2017  Will recheck echocardiogram and continue IV furosemide  Patient has had approximately 10 lb weight gain  Decompensation of CHF may be tachycardia related as patient was tachycardic during ER admission several days ago for UTI and is tachycardic today requiring IV diltiazem  Atrial flutter [facility])  Assessment & Plan  Atrial fibrillation/flutter with RVR requiring IV diltiazem in the emergency department  Previously was on upwards of diltiazem  mg b i d  and titrated down during last cardiology visit to 30 mg t i d   Will restart diltiazem SR at 90 mg b i d  and monitor with p r n  diltiazem and have cardiology evaluate  Urinary retention  Assessment & Plan  Chronic urinary retention with catheter  Was recently seen in the emergency department on 9/30/2019 where she was started on ciprofloxacin  Urine culture at that time e coli  Will discontinue further antibiotics as patient has received three days of effective treatment  History of cerebral aneurysm repair  Assessment & Plan  History of cerebral aneurysm status post clipping in 1996  Subsequently has migraine headaches  Patient does follow up with [facility] and is maintained on carbamazepine and depakote     Will trial fioricet  PAD (peripheral artery disease) ([facility])  Assessment & Plan  Peripheral artery disease status post lower extremity stenting by cardiology and subsequently vascular surgery    No evidence of claudication at this time  Continue clopidogrel and atorvastatin    Hypothyroidism  Assessment & Plan  Hypothyroidism continue levothyroxine and check TSH    Essential hypertension  Assessment & Plan  Essential hypertension continue metoprolol and diltiazem used for rate control    Stage 3 chronic kidney disease ([facility] )  Assessment & Plan  CKD 3 patient did see her nephrologist today in [facility] with IV lasix  Results from last 7 days   Lab Units 10/03/19  1532 09/30/19 2008   BUN mg/dL 28* 33*   CREATININE mg/dL 1 61* 1 46*   EGFR ml/min/1 73sq m 32 35       Hyperlipidemia  Assessment & Plan  Hyperlipidemia  Continue atorvastatin  Dementia [facility])  Assessment & Plan  Dementia without behavioral disturbance  Patient does have underlying depression and mood currently stable on wellbutrin and lorazepam     VTE Prophylaxis: Warfarin (Coumadin)  Code Status:  Level one full code  Anticipated Length of Stay:  Patient will be admitted on an Inpatient basis with an anticipated length of stay of  greater than 2 midnights  Justification for Hospital Stay: Acute on chronic diastolic congestive heart failure ([facility] )  Total Time for Visit, including Counseling / Coordination of Care: 45 mins  Greater than 50% of this total time spent on direct patient counseling and coordination of care  Chief Complaint:     Chief Complaint   Patient presents with    Shortness of Breath     x 2 days; chest heaviness     History of Present Illness: Ezella Barthel is a 69 y o  female who presents with worsening shortness of breath and chest heaviness  The patient does have multiple medical comorbidities including but not limited to cerebral aneurysm status post clipping, PA D status post lower extremity stenting, atrial flutter, and dementia  She resides at a assisted living facility    Her daughter is accompanying her at the bedside as she took her to her urology and nephrology appointments today prior to coming here to the emergency department  Daughter and patient states that patient has been having increased work of breathing  She does have sleep apnea but is not compliant with face device and has been ordered nasal cannula to use at night  She has had to use nasal cannula during the day and had noticed increased lower extremity swelling  When she went to her doctor's appointments today she had noted a 10 lb weight gain and when she called the cardiology office she was instructed to come here to the emergency department to seek care  Here she was noted to be tachycardic requiring IV diltiazem and admission was requested for decompensated CHF  Of note, she was seen on 9/30/2019 for UTI and given a prescription for ciprofloxacin      Review of Systems:  History obtained from chart review, the patient and daughter  General ROS: negative for - chills or fever  Psychological ROS: negative for - hallucinations or irritability  Ophthalmic ROS: negative for - loss of vision  Respiratory ROS: positive for - shortness of breath  Cardiovascular ROS: positive for - chest pain  Gastrointestinal ROS: negative for - melena  Genito-Urinary ROS: positive for - chronic urinary catheter  Musculoskeletal ROS: positive for - lower extremity swelling bilaterally  Neurological ROS: positive for - headaches  Otherwise, all other 12 point review of systems normal     Past Medical and Surgical History:   Past Medical History:   Diagnosis Date    [facility]     Brain aneurysm     CAD (coronary artery disease)     Cardiac disease     had stents 12 years ago in [facility]    CKD (chronic kidney disease) stage 3, GFR 30-59 ml/min ([facility] ckd    COPD (chronic obstructive pulmonary disease) [facility]     D[facility]     Diastolic CHF, chronic ([facility])     GERD (gastroesophageal reflux disease)     Hyperlipidemia     Hyperlipidemia     Hypertension     Hypothyroidism     Migraine     [facility]     Renal disorder     Seizures [facility]     Stroke [facility])      Past Surgical History:   Procedure Laterality Date    APPENDECTOMY      ARTERIOGRAM N/A 12/7/2018    Procedure: ARTERIOGRAM WITH STENT PLACEMENT;  Surgeon: Reva Reddy MD;  [facility];  Service: Vascular    BLADDER TUMOR EXCISION      BRAIN SURGERY      1998  clip right frontal lobe    CARDIAC SURGERY      COLONOSCOPY      CORONARY STENT PLACEMENT      5 stents    EYE SURGERY      IR ABDOMINAL ANGIOGRAPHY / INTERVENTION  12/7/2018    MANDIBLE FRACTURE SURGERY      TONSILLECTOMY      TUBAL LIGATION      UTERINE FIBROID SURGERY       Meds/Allergies: Allergies: Allergies   Allergen Reactions    Spiriva Handihaler [Tiotropium Bromide Monohydrate] Swelling    Ramipril Facial Swelling    Penicillins      unsure     Prior to Admission Medications   Prescriptions Last Dose Informant Patient Reported? Taking?    Cholecalciferol (VITAMIN D) 2000 units CAPS  Outside Facility (Specify) No No   Sig: Take 1 capsule (2,000 Units total) by mouth daily   Citric Vs-Jxlqxwtivzz-Ji Carb (RENACIDIN) SOLN  Outside Facility (Specify) Yes No   Sig: Indications: 60ml to irrigate baca once a week on Friday   LORazepam (ATIVAN) 0 5 mg tablet   Yes Yes   Sig: Take 0 5 mg by mouth 2 (two) times a day   Sennosides-Docusate Sodium (SENNA PLUS PO)  Outside Facility (Specify) Yes No   Sig: Take by mouth daily as needed   albuterol (PROVENTIL HFA,VENTOLIN HFA) 90 mcg/act inhaler  Outside Facility (Specify) No No   Sig: Inhale 2 puffs every 6 (six) hours as needed for wheezing   atorvastatin (LIPITOR) 10 mg tablet  Outside Facility (Specify) No No   Sig: Take 1 tablet (10 mg total) by mouth daily   betamethasone dipropionate (DIPROSONE) 0 05 % cream  Outside Facility (Specify) Yes No   Sig: Apply topically 3 (three) times a day   buPROPion (WELLBUTRIN SR) 150 mg 12 hr tablet  Outside Facility (Specify) No No   Sig: Take 1 tablet (150 mg total) by mouth daily   carBAMazepine (TEGretol) 100 mg chewable tablet  Outside Facility (Specify) No No   Sig: Chew 1 tablet (100 mg total) 3 (three) times a day   ciprofloxacin (CIPRO) 500 mg tablet   No No   Sig: Take 1 tablet (500 mg total) by mouth 2 (two) times a day for 10 days   clopidogrel (PLAVIX) 75 mg tablet  Outside Facility (Specify) No No   Sig: Take 1 tablet (75 mg total) by mouth daily   clotrimazole (LOTRIMIN) 1 % cream  Outside Facility (Specify) Yes No   Sig: Apply topically 3 (three) times a day   diltiazem (CARDIZEM) 30 mg tablet   No No   Sig: Take 1 tablet (30 mg total) by mouth 3 (three) times a day   divalproex sodium (DEPAKOTE ER) 250 mg 24 hr tablet   No No   Sig: Take 1 tablet (250 mg total) by mouth 2 (two) times a day   fenofibrate micronized (LOFIBRA) 67 MG capsule  Outside Facility (Specify) No No   Sig: Take 1 capsule (67 mg total) by mouth daily with breakfast   ferrous sulfate 325 (65 Fe) mg tablet  Outside Facility (Specify) No No   Sig: Take 1 tablet (325 mg total) by mouth 2 (two) times a day   fluticasone-vilanterol (BREO ELLIPTA) 100-25 mcg/inh inhaler  Outside Facility (Specify) No No   Sig: Inhale 1 puff daily   furosemide (LASIX) 40 mg tablet  Outside Facility (Specify) No Yes   Sig: Take 1 tablet (40 mg total) by mouth daily   levothyroxine 88 mcg tablet  Outside Facility (Specify) No No   Sig: Take 1 tablet (88 mcg total) by mouth daily   metoprolol tartrate (LOPRESSOR) 50 mg tablet  Outside Facility (Specify) No No   Sig: Take 1 tablet (50 mg total) by mouth every 12 (twelve) hours   nitroglycerin (NITROSTAT) 0 4 mg SL tablet  Outside Facility (Specify) No No   Sig: Place 1 tablet (0 4 mg total) under the tongue every 5 (five) minutes as needed for chest pain   potassium chloride (K-DUR,KLOR-CON) 20 mEq tablet  Outside Facility (Specify) No No   Sig: Take 1 tablet (20 mEq total) by mouth daily   saccharomyces boulardii (FLORASTOR) 250 mg capsule   No No   Sig: Take 1 capsule (250 mg total) by mouth 2 (two) times a day   warfarin (COUMADIN) 3 mg tablet  Outside Facility (Specify) No No   Sig: One tablet daily or as otherwise directed        Facility-Administered Medications: None     Social History:     Social History     Socioeconomic History    Marital status:      Spouse name: Not on file    Number of children: 3    Years of education: Not on file    Highest education level: Not on file   Occupational History    Occupation: retired   Social Needs    Financial resource strain: Not on file    Food insecurity:     Worry: Not on file     Inability: Not on file   [employer] needs:     Medical: Not on file     Non-medical: Not on file   Tobacco Use    Smoking status: Former Smoker     Packs/day: 5 00     Years: 40 00     Pack years: 200 00     Last attempt to quit: 2007     Years since quittin 0    Smokeless tobacco: Never Used   Substance and Sexual Activity    Alcohol use: No     Alcohol/week: 0 0 standard drinks    Drug use: No    Sexual activity: Not Currently   Lifestyle    Physical activity:     Days per week: Not on file     Minutes per session: Not on file    Stress: Not on file   Relationships    Social connections:     Talks on phone: Not on file     Gets together: Not on file     Attends Confucianism service: Not on file     Active member of club or organization: Not on file     Attends meetings of clubs or organizations: Not on file     Relationship status: Not on file    Intimate partner violence:     Fear of current or ex partner: Not on file     Emotionally abused: Not on file     Physically abused: Not on file     Forced sexual activity: Not on file   Other Topics Concern    Not on file   Social History Narrative    Not on file     Patient Pre-hospital Living Situation:  Assisted living facility  Patient Pre-hospital Level of Mobility:   Patient Pre-hospital Diet Restrictions:     Family History:  Family History   Problem Relation Age of Onset  Heart disease Father     Parkinsonism Father     Macular degeneration Mother     Glaucoma Mother     Diabetes Brother     Heart disease Brother      Physical Exam:   Vitals:   Blood Pressure: (!) 190/116 (10/03/19 2000)  Pulse: (!) 128 (10/03/19 2000)  Temperature: 98 °F (36 7 °C) (10/03/19 1507)  Temp Source: Oral (10/03/19 1507)  Respirations: 18 (10/03/19 2000)  SpO2: 94 % (10/03/19 2000)    General appearance: alert, appears stated age and cooperative  Skin: Skin color, texture, turgor normal  No rashes or lesions  Head: Normocephalic, without obvious abnormality, atraumatic  Eyes: conjunctivae/corneas clear  PERRL, EOM's intact  Lungs: diminished breath sounds  Heart: irregularly irregular rhythm  Abdomen: Soft somewhat distended but nontender positive bowel sounds  Back: symmetric, no curvature  ROM normal  No CVA tenderness  Extremities: edema +2 lower extremities bilaterally  Neurologic:  Pleasantly demented strength symmetric  Psychiatric:  Appropriate mood and cooperative    Lab Results: I have personally reviewed pertinent reports      Results from last 7 days   Lab Units 10/03/19  1532   WBC Thousand/uL 6 86   HEMOGLOBIN g/dL 12 4   HEMATOCRIT % 38 8   PLATELETS Thousands/uL 171   NEUTROS PCT % 63   LYMPHS PCT % 23   MONOS PCT % 11   EOS PCT % 2     Results from last 7 days   Lab Units 10/03/19  1532 09/30/19 2008   SODIUM mmol/L 147* 147*   POTASSIUM mmol/L 3 8 4 3   CHLORIDE mmol/L 109* 110*   CO2 mmol/L 27 30   ANION GAP mmol/L 11 7   BUN mg/dL 28* 33*   CREATININE mg/dL 1 61* 1 46*   CALCIUM mg/dL 9 1 9 3   ALBUMIN g/dL 3 4* 3 4*   TOTAL BILIRUBIN mg/dL 0 30 0 20   ALK PHOS U/L 61 57   ALT U/L 34 19   AST U/L 32 22   EGFR ml/min/1 73sq m 32 35   GLUCOSE RANDOM mg/dL 162* 98     Results from last 7 days   Lab Units 10/03/19  1532   INR  2 92*     Results from last 7 days   Lab Units 10/03/19  1532 09/30/19 2008   TROPONIN I ng/mL <0 02 <0 02             Results from last 7 days   Lab Units 10/03/19  1532   NT-PRO BNP pg/mL 12,447*         Imaging: I have personally reviewed pertinent films in PACS  Chest x-ray 10/3/2019  Pulmonary edema  EKG, Pathology, and Other Studies Reviewed on Admission:   EKG  Result Date: 10/03/19  Personally reviewed strips with impression of:  Atrial flutter 129 bpm    Allscripts/ Epic Records Reviewed: Yes    ** Please Note: This note has been constructed using a voice recognition system   **

## 2019-10-03 NOTE — ED NOTES
1 CC- Shortness of breath  2  Orientation status- A&Ox4  3  Abnormal labs/vitals/focused assessment- Refer to recent lab values  4  Medication/drips- N/A  5  Narcotic time- N/A  6 IV lines/drains/etc - 20 R AC  7  Isolation status- N/A  8 Skin- Unable to assess  9  Ambulation status- Unable to assess  10   ED phone number- #[phone]       Tegan Walker RN  10/03/19 2830

## 2019-10-03 NOTE — TELEPHONE ENCOUNTER
pt is on your recall list for January  S/w pts Daughter Tal Nogueira, she informed me her mother was seen at the ER on 9/30 and she is very unhappy with what they told her  She was told [facility] only had a very bad UTI and there were no other concerns  She thinks that her mom maybe in AFIB or worse  Her BP at the hospital was 176/101 Pulse 118 pt did have and EKG done in the hospital  Pt has been SOB, Wheezing, chest heaviness is extremely agitated and believe she is getting worse  She would like to know what her next step should be, should she be seen sooner? She does have an appts today with her kidney specialist and urologist and will inform them of the same symptoms stated above i also informed her if it continues or gets worse she is to report to the ER

## 2019-10-03 NOTE — TELEPHONE ENCOUNTER
I agree  If she is having shortness of breath or palpitations she should go to the ED  Please schedule for my first available and place on cancellation list with any provider       Thanks

## 2019-10-03 NOTE — TELEPHONE ENCOUNTER
Pt's daughter called and stated that pt has not been feeling well  Pt was seen in [facility] on 9/30 and was told she had a UTI  Pt's daughter thinks that something else is going on with pt and pt does have a history of AFIB  Pt's daughter would like a call back to get some advice on should she bring pt to the hospital or in for an appointment

## 2019-10-03 NOTE — TELEPHONE ENCOUNTER
Pt daughter called back again and said her mom is complaining that her breathing is getting worse  Transferred call to [facility]

## 2019-10-03 NOTE — ED PROVIDER NOTES
History  Chief Complaint   Patient presents with    Shortness of Breath     x 2 days; chest heaviness     64-year-old female here with dyspnea for about 2 weeks now getting progressively worse  Over the past 2 days also having and a chest pressure with this  This pressure is intermittent  Currently subsided  States she can walk a few steps without becoming short of breath  Note she is supposed to be taking Lasix but is unsure she has been getting it recently  Has had increased leg swelling  No cough no fevers no chills  Denies any lightheadedness or dizziness  No hemoptysis  History of congestive heart failure this feels similar  No change in diet recently  Sleeping with additional pillows under head  Wears 2L O2 by nasal cannula, "just as needed" which has has been using frequently in the past 2 weeks  History provided by:  Patient   used: No    Shortness of Breath   Severity:  Moderate  Onset quality:  Gradual  Duration:  2 weeks  Timing:  Constant  Progression:  Worsening  Chronicity:  New  Context: not activity, not animal exposure, not emotional upset, not fumes, not known allergens, not occupational exposure, not pollens, not smoke exposure, not strong odors, not URI and not weather changes    Relieved by:  Nothing  Worsened by:  Exertion  Ineffective treatments:  None tried  Associated symptoms: chest pain    Associated symptoms: no abdominal pain, no claudication, no cough, no diaphoresis, no ear pain, no fever, no headaches, no hemoptysis, no neck pain, no PND, no rash, no sore throat, no sputum production, no syncope, no swollen glands, no vomiting and no wheezing    Risk factors: no recent alcohol use, no family hx of DVT, no hx of cancer, no hx of PE/DVT, no obesity, no oral contraceptive use, no prolonged immobilization, no recent surgery and no tobacco use        Prior to Admission Medications   Prescriptions Last Dose Informant Patient Reported? Taking? Cholecalciferol (VITAMIN D) 2000 units CAPS  Outside Facility (Specify) No No   Sig: Take 1 capsule (2,000 Units total) by mouth daily   Citric An-Libpspleuoq-Ua Carb (RENACIDIN) SOLN  Outside Facility (Specify) Yes No   Sig: Indications: 60ml to irrigate baca once a week on Friday   Sennosides-Docusate Sodium (SENNA PLUS PO)  Outside Facility (Specify) Yes No   Sig: Take by mouth daily as needed   acetaminophen (TYLENOL) 500 mg tablet  Outside Facility (Specify) Yes No   Sig: Take 500 mg by mouth every 6 (six) hours as needed for mild pain   albuterol (2 5 mg/3 mL) 0 083 % nebulizer solution  Outside Facility (Specify) No No   Sig: Take 1 vial (2 5 mg total) by nebulization every 6 (six) hours as needed for wheezing   albuterol (PROVENTIL HFA,VENTOLIN HFA) 90 mcg/act inhaler  Outside Facility (Specify) No No   Sig: Inhale 2 puffs every 6 (six) hours as needed for wheezing   allopurinol (ZYLOPRIM) 300 mg tablet  Outside Facility (Specify) No No   Sig: Take 1 tablet (300 mg total) by mouth every morning for 14 days   atorvastatin (LIPITOR) 10 mg tablet  Outside Facility (Specify) No No   Sig: Take 1 tablet (10 mg total) by mouth daily   betamethasone dipropionate (DIPROSONE) 0 05 % cream  Outside Facility (Specify) Yes No   Sig: Apply topically 3 (three) times a day   buPROPion (WELLBUTRIN SR) 150 mg 12 hr tablet  Outside Facility (Specify) No No   Sig: Take 1 tablet (150 mg total) by mouth daily   carBAMazepine (TEGretol) 100 mg chewable tablet  Outside Facility (Specify) No No   Sig: Chew 1 tablet (100 mg total) 3 (three) times a day   ciprofloxacin (CIPRO) 500 mg tablet   No No   Sig: Take 1 tablet (500 mg total) by mouth 2 (two) times a day for 10 days   clopidogrel (PLAVIX) 75 mg tablet  Outside Facility (Specify) No No   Sig: Take 1 tablet (75 mg total) by mouth daily   clotrimazole (LOTRIMIN) 1 % cream  Outside Facility (Specify) Yes No   Sig: Apply topically 3 (three) times a day   diltiazem (CARDIZEM) 30 mg tablet   No No   Sig: Take 1 tablet (30 mg total) by mouth 3 (three) times a day   divalproex sodium (DEPAKOTE ER) 250 mg 24 hr tablet   No No   Sig: Take 1 tablet (250 mg total) by mouth 2 (two) times a day   fenofibrate micronized (LOFIBRA) 67 MG capsule  Outside Facility (Specify) No No   Sig: Take 1 capsule (67 mg total) by mouth daily with breakfast   ferrous sulfate 325 (65 Fe) mg tablet  Outside Facility (Specify) No No   Sig: Take 1 tablet (325 mg total) by mouth 2 (two) times a day   fluticasone-vilanterol (BREO ELLIPTA) 100-25 mcg/inh inhaler  Outside Facility (Specify) No No   Sig: Inhale 1 puff daily   furosemide (LASIX) 40 mg tablet  Outside Facility (Specify) No Yes   Sig: Take 1 tablet (40 mg total) by mouth daily   guaiFENesin (ROBITUSSIN) 100 mg/5 mL oral solution   No No   Sig: Take 10 mL (200 mg total) by mouth every 4 (four) hours as needed (cough)   Patient not taking: Reported on 7/11/2019   levothyroxine 100 mcg tablet   Yes No   Sig: Take 100 mcg by mouth daily   levothyroxine 88 mcg tablet  Outside Facility (Specify) No No   Sig: Take 1 tablet (88 mcg total) by mouth daily   metoprolol tartrate (LOPRESSOR) 50 mg tablet  Outside Facility (Specify) No No   Sig: Take 1 tablet (50 mg total) by mouth every 12 (twelve) hours   nitroglycerin (NITROSTAT) 0 4 mg SL tablet  Outside Facility (Specify) No No   Sig: Place 1 tablet (0 4 mg total) under the tongue every 5 (five) minutes as needed for chest pain   potassium chloride (K-DUR,KLOR-CON) 20 mEq tablet  Outside Facility (Specify) No No   Sig: Take 1 tablet (20 mEq total) by mouth daily   saccharomyces boulardii (FLORASTOR) 250 mg capsule   No No   Sig: Take 1 capsule (250 mg total) by mouth 2 (two) times a day   warfarin (COUMADIN) 3 mg tablet  Outside Facility (Specify) No No   Sig: One tablet daily or as otherwise directed     warfarin (COUMADIN) 4 mg tablet   Yes No   Sig: Take by mouth daily      Facility-Administered Medications: None Past Medical History:   Diagnosis Date    Atrial fibrillation ([place] [address] )     Brain aneurysm     CAD (coronary artery disease)     Cardiac disease     had stents 12 years ago [place] [id number] CHF (congestive heart failure) ([place] [address] )     CKD (chronic kidney disease)     COPD (chronic obstructive pulmonary disease) ([place] [address] )     Dementia     Dementia     Disease of thyroid gland     GERD (gastroesophageal reflux disease)     Hyperlipidemia     Hyperlipidemia     Hypertension     MI (myocardial infarction) ([place] [address] )     Migraine     Renal disorder     Seizures ([place] [address] )     Stroke [facility])        Past Surgical History:   Procedure Laterality Date    APPENDECTOMY      ARTERIOGRAM N/A 2018    Procedure: ARTERIOGRAM WITH STENT PLACEMENT;  Surgeon: Earline Reddy MD;  [facility];  Service: Vascular    BLADDER TUMOR EXCISION      BRAIN SURGERY        clip right frontal lobe    CARDIAC SURGERY      COLONOSCOPY      CORONARY STENT PLACEMENT      5 stents    EYE SURGERY      IR ABDOMINAL ANGIOGRAPHY / INTERVENTION  2018    MANDIBLE FRACTURE SURGERY      TONSILLECTOMY      TUBAL LIGATION      UTERINE FIBROID SURGERY         Family History   Problem Relation Age of Onset    Heart disease Father     Parkinsonism Father     Macular degeneration Mother     Glaucoma Mother     Diabetes Brother     Heart disease Brother      I have reviewed and agree with the history as documented  Social History     Tobacco Use    Smoking status: Former Smoker     Packs/day: 5 00     Years: 40 00     Pack years: 200 00     Last attempt to quit: 2007     Years since quittin 0    Smokeless tobacco: Never Used   Substance Use Topics    Alcohol use: No     Alcohol/week: 0 0 standard drinks    Drug use: No        Review of Systems   Constitutional: Negative for activity change, appetite change, chills, diaphoresis, fatigue, fever and unexpected weight change     HENT: Negative for congestion, ear pain, rhinorrhea, sinus pressure, sore throat and trouble swallowing  Eyes: Negative for photophobia and visual disturbance  Respiratory: Positive for shortness of breath  Negative for apnea, cough, hemoptysis, sputum production, choking, chest tightness, wheezing and stridor  Cardiovascular: Positive for chest pain  Negative for palpitations, claudication, leg swelling, syncope and PND  Gastrointestinal: Negative for abdominal distention, abdominal pain, blood in stool, constipation, diarrhea, nausea and vomiting  Genitourinary: Negative for decreased urine volume, difficulty urinating, dysuria, enuresis, flank pain, frequency, hematuria and urgency  Musculoskeletal: Negative for arthralgias, myalgias, neck pain and neck stiffness  Skin: Negative for color change, pallor, rash and wound  Allergic/Immunologic: Negative  Neurological: Negative for dizziness, tremors, syncope, weakness, light-headedness, numbness and headaches  Hematological: Negative  Psychiatric/Behavioral: Negative  All other systems reviewed and are negative  Physical Exam  Physical Exam   Constitutional: She is oriented to person, place, and time  She appears well-developed and well-nourished  Non-toxic appearance  She does not have a sickly appearance  She does not appear ill  No distress  HENT:   Head: Normocephalic and atraumatic  Eyes: Pupils are equal, round, and reactive to light  EOM and lids are normal    Neck: Normal range of motion  Neck supple  Cardiovascular: Regular rhythm, S1 normal, S2 normal, normal heart sounds, intact distal pulses and normal pulses  Tachycardia present  Exam reveals no gallop, no distant heart sounds, no friction rub and no decreased pulses  No murmur heard  Pulses:       Radial pulses are 2+ on the right side, and 2+ on the left side  Pulmonary/Chest: Effort normal and breath sounds normal  No accessory muscle usage  Tachypnea noted   No apnea and no bradypnea  No respiratory distress  She has no decreased breath sounds  She has no wheezes  She has no rhonchi  She has no rales  Conversational dyspnea, pursed lip breathing  Abdominal: Soft  Normal appearance  She exhibits no distension  There is no tenderness  There is no rigidity, no rebound and no guarding  Musculoskeletal: Normal range of motion  She exhibits no edema, tenderness or deformity  Neurological: She is alert and oriented to person, place, and time  No cranial nerve deficit  GCS eye subscore is 4  GCS verbal subscore is 5  GCS motor subscore is 6  Skin: Skin is warm, dry and intact  No rash noted  She is not diaphoretic  No erythema  No pallor  Psychiatric: Her speech is normal    Nursing note and vitals reviewed        Vital Signs  ED Triage Vitals   Temperature Pulse Respirations Blood Pressure SpO2   10/03/19 1507 10/03/19 1507 10/03/19 1507 10/03/19 1507 10/03/19 1507   98 °F (36 7 °C) (!) 129 (!) 24 133/93 91 %      Temp Source Heart Rate Source Patient Position - Orthostatic VS BP Location FiO2 (%)   10/03/19 1507 10/03/19 1800 10/03/19 1507 10/03/19 1507 --   Oral Monitor Lying Right arm       Pain Score       --                  Vitals:    10/03/19 1600 10/03/19 1745 10/03/19 1800 10/03/19 1832   BP: 153/91 142/97 144/97 142/90   Pulse: (!) 127 (!) 129 (!) 127 69   Patient Position - Orthostatic VS: Lying Lying Lying Lying         Visual Acuity      ED Medications  Medications   furosemide (LASIX) injection 60 mg (60 mg Intravenous Given 10/3/19 1740)   potassium chloride (K-DUR,KLOR-CON) CR tablet 40 mEq (40 mEq Oral Given 10/3/19 1740)   metoprolol (LOPRESSOR) injection 5 mg (5 mg Intravenous Given 10/3/19 1740)   diltiazem (CARDIZEM) injection 22 5 mg (22 5 mg Intravenous Given 10/3/19 1826)       Diagnostic Studies  Results Reviewed     Procedure Component Value Units Date/Time    TSH [[id number]]  (Normal) Collected:  10/03/19 1532    Lab Status:  Final result Specimen:  Blood from Arm, Right Updated:  10/03/19 1610     TSH 3RD GENERATON 2 351 uIU/mL     Narrative:       Patients undergoing fluorescein dye angiography may retain small amounts of fluorescein in the body for 48-72 hours post procedure  Samples containing fluorescein can produce falsely depressed TSH values  If the patient had this procedure,a specimen should be resubmitted post fluorescein clearance        Magnesium [[id number]]  (Normal) Collected:  10/03/19 1532    Lab Status:  Final result Specimen:  Blood from Arm, Right Updated:  10/03/19 1610     Magnesium 1 9 mg/dL     NT-BNP PRO (BNP for [facility], [facility], [facility], [place], [place], [facility], [facility], [place] campuses) [[id number]]  (Abnormal) Collected:  10/03/19 1532    Lab Status:  Final result Specimen:  Blood from Arm, Right Updated:  10/03/19 1610     NT-proBNP 12,447 pg/mL     Troponin I [[id number]]  (Normal) Collected:  10/03/19 1532    Lab Status:  Final result Specimen:  Blood from Arm, Right Updated:  10/03/19 1605     Troponin I <0 02 ng/mL     Comprehensive metabolic panel [[id number]]  (Abnormal) Collected:  10/03/19 1532    Lab Status:  Final result Specimen:  Blood from Arm, Right Updated:  10/03/19 1603     Sodium 147 mmol/L      Potassium 3 8 mmol/L      Chloride 109 mmol/L      CO2 27 mmol/L      ANION GAP 11 mmol/L      BUN 28 mg/dL      Creatinine 1 61 mg/dL      Glucose 162 mg/dL      Calcium 9 1 mg/dL      AST 32 U/L      ALT 34 U/L      Alkaline Phosphatase 61 U/L      Total Protein 7 3 g/dL      Albumin 3 4 g/dL      Total Bilirubin 0 30 mg/dL      eGFR 32 ml/min/1 73sq m     Narrative:       [facility] guidelines for Chronic Kidney Disease (CKD):     Stage 1 with normal or high GFR (GFR > 90 mL/min/1 73 square meters)    Stage 2 Mild CKD (GFR = 60-89 mL/min/1 73 square meters)    Stage 3A Moderate CKD (GFR = 45-59 mL/min/1 73 square meters)    Stage 3B Moderate CKD (GFR = 30-44 mL/min/1 73 square meters)    Stage 4 Severe CKD (GFR = 15-29 mL/min/1 73 square meters)    Stage 5 End Stage CKD (GFR <15 mL/min/1 73 square meters)  Note: GFR calculation is accurate only with a steady state creatinine    APTT [[id number]]  (Abnormal) Collected:  10/03/19 1532    Lab Status:  Final result Specimen:  Blood from Arm, Right Updated:  10/03/19 1602     PTT 41 seconds     Protime-INR [[id number]]  (Abnormal) Collected:  10/03/19 1532    Lab Status:  Final result Specimen:  Blood from Arm, Right Updated:  10/03/19 1602     Protime 30 9 seconds      INR 2 92    CBC and differential [[id number]]  (Abnormal) Collected:  10/03/19 1532    Lab Status:  Final result Specimen:  Blood from Arm, Right Updated:  10/03/19 1547     WBC 6 86 Thousand/uL      RBC 3 64 Million/uL      Hemoglobin 12 4 g/dL      Hematocrit 38 8 %       fL      MCH 34 1 pg      MCHC 32 0 g/dL      RDW 13 1 %      MPV 12 3 fL      Platelets 767 Thousands/uL      nRBC 0 /100 WBCs      Neutrophils Relative 63 %      Immat GRANS % 1 %      Lymphocytes Relative 23 %      Monocytes Relative 11 %      Eosinophils Relative 2 %      Basophils Relative 0 %      Neutrophils Absolute 4 36 Thousands/µL      Immature Grans Absolute 0 04 Thousand/uL      Lymphocytes Absolute 1 57 Thousands/µL      Monocytes Absolute 0 72 Thousand/µL      Eosinophils Absolute 0 15 Thousand/µL      Basophils Absolute 0 02 Thousands/µL                  XR chest 2 views   ED Interpretation by Prateek Núñez PA-C (10/03 1649)   Cardiomegaly with pulmonary vascular congestion                 Procedures  Procedures       ED Course         HEART Risk Score      Most Recent Value   History  1 Filed at: 10/03/2019 1901   ECG  1 Filed at: 10/03/2019 1901   Age  2 Filed at: 10/03/2019 1901   Risk Factors  2 Filed at: 10/03/2019 1901   Troponin  0 Filed at: 10/03/2019 1901   Heart Score Risk Calculator   History  1 Filed at: 10/03/2019 1901   ECG  1 Filed at: 10/03/2019 1901   Age  2 Filed at: 10/03/2019 1901   Risk Factors  2 Filed at: 10/03/2019 1901 Troponin  0 Filed at: 10/03/2019 1901   HEART Score  6 Filed at: 10/03/2019 1901   HEART Score  6 Filed at: 10/03/2019 1901        Identification of Seniors at Risk      Most Recent Value   (ISAR) Identification of Seniors at Risk   Before the illness or injury that brought you to the Emergency, did you need someone to help you on a regular basis? 1 Filed at: 10/03/2019 1509   In the last 24 hours, have you needed more help than usual?  1 Filed at: 10/03/2019 1509   Have you been hospitalized for one or more nights during the past 6 months? 1 Filed at: 10/03/2019 1509   In general, do you see well?  0 Filed at: 10/03/2019 1509   In general, do you have serious problems with your memory? 1 Filed at: 10/03/2019 1509   Do you take more than three different medications every day? 1 Filed at: 10/03/2019 1509   ISAR Score  5 Filed at: 10/03/2019 1509                          MDM  Number of Diagnoses or Management Options  Acute CHF (congestive heart failure) ([facility] [address] ): new and requires workup  Atrial flutter [facility]): new and requires workup  Diagnosis management comments: DDX including but not limited to: pneumonia, pleural effusion, CHF, PE, PTX, ACS, MI, asthma exacerbation, COPD exacerbation, anemia, metabolic abnormality, renal failure  Plan:  Cardiac workup chest x-ray  Titrate oxygen by nasal cannula as needed       Amount and/or Complexity of Data Reviewed  Clinical lab tests: ordered and reviewed  Tests in the radiology section of CPT®: ordered and reviewed  Independent visualization of images, tracings, or specimens: yes    Risk of Complications, Morbidity, and/or Mortality  Presenting problems: moderate  Management options: low  General comments: 69year-old with shortness of breath  Exam and workup are consistent with congestive heart failure  BNP of [address]0009  She was given diuresis  She was started on oxygen 2 L by nasal cannula  She has now been resting comfortably    She did have atrial flutter with rapid ventricular response, after Cardizem her heart rate improved  Given severity of her current symptoms, plan is for admission  Patient agrees  Spoke with Internal Medicine who agrees with plan as well  Patient Progress  Patient progress: stable      Disposition  Final diagnoses:   Acute CHF (congestive heart failure) ([facility] )   Atrial flutter ([facility] )     Time reflects when diagnosis was documented in both MDM as applicable and the Disposition within this note     Time User Action Codes Description Comment    10/3/2019  6:58 PM Michael LAN Add [I50 9] Acute CHF (congestive heart failure) ([facility] )     10/3/2019  6:58 PM Francisco Martinez [I48 91,  I48 92] Flutter-fibrillation ([place] [address] )     10/3/2019  6:59 PM Yash Aburto [I48 91,  I48 92] Flutter-fibrillation ([place] [address] )     10/3/2019  6:59 PM Medinajarrett Arenas Add [I48 92] Atrial flutter [facility])       ED Disposition     ED Disposition Condition Date/Time Comment    Admit Stable Thu Oct 3, 2019  6:58 PM Case was discussed with Dr Emmy Brenner and the patient's admission status was agreed to be Admission Status: inpatient status to the service of Dr Emmy Brenner   Follow-up Information    None         Patient's Medications   Discharge Prescriptions    No medications on file     No discharge procedures on file      ED Provider  Electronically Signed by           Glenn Candelaria PA-C  10/03/19 9028

## 2019-10-04 ENCOUNTER — APPOINTMENT (INPATIENT)
Dept: NON INVASIVE DIAGNOSTICS | Facility: HOSPITAL | Age: 74
DRG: 286 | End: 2019-10-04
Payer: MEDICARE

## 2019-10-04 LAB
ALBUMIN SERPL BCP-MCNC: 3.1 G/DL (ref 3.5–5)
ALP SERPL-CCNC: 55 U/L (ref 46–116)
ALT SERPL W P-5'-P-CCNC: 30 U/L (ref 12–78)
ANION GAP SERPL CALCULATED.3IONS-SCNC: 9 MMOL/L (ref 4–13)
AST SERPL W P-5'-P-CCNC: 29 U/L (ref 5–45)
ATRIAL RATE: 107 BPM
ATRIAL RATE: 131 BPM
BILIRUB SERPL-MCNC: 0.4 MG/DL (ref 0.2–1)
BUN SERPL-MCNC: 27 MG/DL (ref 5–25)
CALCIUM SERPL-MCNC: 9 MG/DL (ref 8.3–10.1)
CHLORIDE SERPL-SCNC: 109 MMOL/L (ref 100–108)
CO2 SERPL-SCNC: 28 MMOL/L (ref 21–32)
CREAT SERPL-MCNC: 1.56 MG/DL (ref 0.6–1.3)
ERYTHROCYTE [DISTWIDTH] IN BLOOD BY AUTOMATED COUNT: 13.2 % (ref 11.6–15.1)
GFR SERPL CREATININE-BSD FRML MDRD: 33 ML/MIN/1.73SQ M
GLUCOSE SERPL-MCNC: 127 MG/DL (ref 65–140)
HCT VFR BLD AUTO: 37 % (ref 34.8–46.1)
HGB BLD-MCNC: 11.9 G/DL (ref 11.5–15.4)
INR PPP: 2.49 (ref 0.84–1.19)
MCH RBC QN AUTO: 33.9 PG (ref 26.8–34.3)
MCHC RBC AUTO-ENTMCNC: 32.2 G/DL (ref 31.4–37.4)
MCV RBC AUTO: 105 FL (ref 82–98)
P AXIS: 49 DEGREES
PLATELET # BLD AUTO: 146 THOUSANDS/UL (ref 149–390)
PMV BLD AUTO: 12.1 FL (ref 8.9–12.7)
POTASSIUM SERPL-SCNC: 4 MMOL/L (ref 3.5–5.3)
PR INTERVAL: 136 MS
PROT SERPL-MCNC: 6.8 G/DL (ref 6.4–8.2)
PROTHROMBIN TIME: 27.2 SECONDS (ref 11.6–14.5)
QRS AXIS: 4 DEGREES
QRS AXIS: 8 DEGREES
QRSD INTERVAL: 102 MS
QRSD INTERVAL: 98 MS
QT INTERVAL: 354 MS
QT INTERVAL: 412 MS
QTC INTERVAL: 472 MS
QTC INTERVAL: 603 MS
RBC # BLD AUTO: 3.51 MILLION/UL (ref 3.81–5.12)
SODIUM SERPL-SCNC: 146 MMOL/L (ref 136–145)
T WAVE AXIS: 112 DEGREES
T WAVE AXIS: 55 DEGREES
VENTRICULAR RATE: 107 BPM
VENTRICULAR RATE: 129 BPM
WBC # BLD AUTO: 6.7 THOUSAND/UL (ref 4.31–10.16)

## 2019-10-04 PROCEDURE — 85610 PROTHROMBIN TIME: CPT | Performed by: INTERNAL MEDICINE

## 2019-10-04 PROCEDURE — 99232 SBSQ HOSP IP/OBS MODERATE 35: CPT | Performed by: PHYSICIAN ASSISTANT

## 2019-10-04 PROCEDURE — 93306 TTE W/DOPPLER COMPLETE: CPT

## 2019-10-04 PROCEDURE — 99222 1ST HOSP IP/OBS MODERATE 55: CPT | Performed by: INTERNAL MEDICINE

## 2019-10-04 PROCEDURE — 93306 TTE W/DOPPLER COMPLETE: CPT | Performed by: INTERNAL MEDICINE

## 2019-10-04 PROCEDURE — 85027 COMPLETE CBC AUTOMATED: CPT | Performed by: INTERNAL MEDICINE

## 2019-10-04 PROCEDURE — 93010 ELECTROCARDIOGRAM REPORT: CPT | Performed by: INTERNAL MEDICINE

## 2019-10-04 PROCEDURE — 80053 COMPREHEN METABOLIC PANEL: CPT | Performed by: INTERNAL MEDICINE

## 2019-10-04 RX ORDER — DILTIAZEM HYDROCHLORIDE 90 MG/1
90 CAPSULE, EXTENDED RELEASE ORAL EVERY 12 HOURS SCHEDULED
Status: DISCONTINUED | OUTPATIENT
Start: 2019-10-04 | End: 2019-10-04

## 2019-10-04 RX ADMIN — Medication 250 MG: at 17:26

## 2019-10-04 RX ADMIN — WARFARIN SODIUM 3 MG: 3 TABLET ORAL at 17:26

## 2019-10-04 RX ADMIN — ACETAMINOPHEN 650 MG: 325 TABLET, FILM COATED ORAL at 12:37

## 2019-10-04 RX ADMIN — FLUTICASONE FUROATE AND VILANTEROL TRIFENATATE 1 PUFF: 100; 25 POWDER RESPIRATORY (INHALATION) at 08:18

## 2019-10-04 RX ADMIN — CARBAMAZEPINE 100 MG: 100 TABLET, CHEWABLE ORAL at 15:33

## 2019-10-04 RX ADMIN — ATORVASTATIN CALCIUM 10 MG: 10 TABLET, FILM COATED ORAL at 08:18

## 2019-10-04 RX ADMIN — AMIODARONE HYDROCHLORIDE 1 MG/MIN: 50 INJECTION, SOLUTION INTRAVENOUS at 15:21

## 2019-10-04 RX ADMIN — AMIODARONE HYDROCHLORIDE 0.5 MG/MIN: 50 INJECTION, SOLUTION INTRAVENOUS at 21:21

## 2019-10-04 RX ADMIN — DILTIAZEM HYDROCHLORIDE 90 MG: 90 CAPSULE, EXTENDED RELEASE ORAL at 11:10

## 2019-10-04 RX ADMIN — DIVALPROEX SODIUM 250 MG: 250 TABLET, FILM COATED, EXTENDED RELEASE ORAL at 08:14

## 2019-10-04 RX ADMIN — LORAZEPAM 0.5 MG: 0.5 TABLET ORAL at 17:26

## 2019-10-04 RX ADMIN — POTASSIUM CHLORIDE 20 MEQ: 1500 TABLET, EXTENDED RELEASE ORAL at 08:18

## 2019-10-04 RX ADMIN — VITAMIN D, TAB 1000IU (100/BT) 1000 UNITS: 25 TAB at 08:18

## 2019-10-04 RX ADMIN — CARBAMAZEPINE 100 MG: 100 TABLET, CHEWABLE ORAL at 08:15

## 2019-10-04 RX ADMIN — Medication 250 MG: at 08:14

## 2019-10-04 RX ADMIN — DIVALPROEX SODIUM 250 MG: 250 TABLET, FILM COATED, EXTENDED RELEASE ORAL at 17:26

## 2019-10-04 RX ADMIN — BUPROPION HYDROCHLORIDE 150 MG: 150 TABLET, FILM COATED, EXTENDED RELEASE ORAL at 08:18

## 2019-10-04 RX ADMIN — CLOPIDOGREL BISULFATE 75 MG: 75 TABLET ORAL at 08:18

## 2019-10-04 RX ADMIN — LEVOTHYROXINE SODIUM 88 MCG: 88 TABLET ORAL at 05:23

## 2019-10-04 RX ADMIN — FERROUS SULFATE TAB 325 MG (65 MG ELEMENTAL FE) 325 MG: 325 (65 FE) TAB at 08:14

## 2019-10-04 RX ADMIN — LORAZEPAM 0.5 MG: 0.5 TABLET ORAL at 08:15

## 2019-10-04 RX ADMIN — FERROUS SULFATE TAB 325 MG (65 MG ELEMENTAL FE) 325 MG: 325 (65 FE) TAB at 17:26

## 2019-10-04 RX ADMIN — CARBAMAZEPINE 100 MG: 100 TABLET, CHEWABLE ORAL at 20:07

## 2019-10-04 NOTE — ASSESSMENT & PLAN NOTE
· History of cerebral aneurysm status post clipping in 1996  Subsequently has migraine headaches  Patient does follow up with [facility] Neurology and is maintained on carbamazepine and depakote

## 2019-10-04 NOTE — ASSESSMENT & PLAN NOTE
History of cerebral aneurysm status post clipping in 1996  Subsequently has migraine headaches  Patient does follow up with [facility] Neurology and is maintained on carbamazepine and depakote     Will trial fioricet

## 2019-10-04 NOTE — ASSESSMENT & PLAN NOTE
Dementia without behavioral disturbance    Patient does have underlying depression and mood currently stable on wellbutrin and lorazepam

## 2019-10-04 NOTE — ASSESSMENT & PLAN NOTE
Chronic urinary retention with catheter  Was recently seen in the emergency department on 9/30/2019 where she was started on ciprofloxacin  Urine culture at that time e coli  Will discontinue further antibiotics as patient has received three days of effective treatment

## 2019-10-04 NOTE — PLAN OF CARE
Problem: Potential for Falls  Goal: Patient will remain free of falls  Description  INTERVENTIONS:  - Assess patient frequently for physical needs  -  Identify cognitive and physical deficits and behaviors that affect risk of falls    -  [place] fall precautions as indicated by assessment   - Educate patient/family on patient safety including physical limitations  - Instruct patient to call for assistance with activity based on assessment  - Modify environment to reduce risk of injury  - Consider OT/PT consult to assist with strengthening/mobility  Outcome: Progressing     Problem: Prexisting or High Potential for Compromised Skin Integrity  Goal: Skin integrity is maintained or improved  Description  INTERVENTIONS:  - Identify patients at risk for skin breakdown  - Assess and monitor skin integrity  - Assess and monitor nutrition and hydration status  - Monitor labs   - Assess for incontinence   - Turn and reposition patient  - Assist with mobility/ambulation  - Relieve pressure over bony prominences  - Avoid friction and shearing  - Provide appropriate hygiene as needed including keeping skin clean and dry  - Evaluate need for skin moisturizer/barrier cream  - Collaborate with interdisciplinary team   - Patient/family teaching  - Consider wound care consult   Outcome: Progressing

## 2019-10-04 NOTE — UTILIZATION REVIEW
Initial Clinical Review    Admission: Date/Time/Statement: Inpatient Admission Orders (From admission, onward)     Ordered        10/03/19 1901  Inpatient Admission  Once                   Orders Placed This Encounter   Procedures    Inpatient Admission     Standing Status:   Standing     Number of Occurrences:   1     Order Specific Question:   Admitting Physician     Answer:   Magalie Riojas [[id number]]     Order Specific Question:   Level of Care     Answer:   Med Surg [[id number]]     Order Specific Question:   Estimated length of stay     Answer:   More than 2 Midnights     Order Specific Question:   Certification     Answer:   I certify that inpatient services are medically necessary for this patient for a duration of greater than two midnights  See H&P and MD Progress Notes for additional information about the patient's course of treatment  ED Arrival Information     Expected Arrival Acuity Means of Arrival Escorted By Service Admission Type    - 10/3/2019 14:55 Emergent [facility]    Arrival Complaint    SOB        Chief Complaint   Patient presents with    Shortness of Breath     x 2 days; chest heaviness     Assessment/Plan: 69year old female to the ED from home with complaints of chest heaviness and dyspnea for 2 weeks prior to her arrival   Admitted to inpatient for CHF, atrial flutter with RVR  Note she is supposed to be taking Lasix but is unsure she has been getting it recently  Has had increased leg swelling  Sleeping with additional pillows under head  Wears 2L O2 by nasal cannula, "just as needed" which has has been using frequently in the past 2 weeks  Upon arrival to the ED, she is tachycardic, has tachypnea with conversational dyspnea and pursed lip breathing, elevated BNP     * Acute on chronic diastolic congestive heart failure [facility])  Assessment & Plan      Wt Readings from Last 3 Encounters:   09/30/19 88 3 kg (194 lb 10 7 oz)   07/18/19 83 5 kg (184 lb)   07/11/19 83 kg (183 lb)      Acute on chronic diastolic congestive heart failure; last echocardiogram December 2017  Will recheck echocardiogram and continue IV furosemide  Patient has had approximately 10 lb weight gain  Decompensation of CHF may be tachycardia related as patient was tachycardic during ER admission several days ago for UTI and is tachycardic today requiring IV diltiazem      Atrial flutter (HCC)  Assessment & Plan  Atrial fibrillation/flutter with RVR requiring IV diltiazem in the emergency department  Previously was on upwards of diltiazem  mg b i d  and titrated down during last cardiology visit to 30 mg t i d   Will restart diltiazem SR at 90 mg b i d  and monitor with p r n  diltiazem and have cardiology evaluate      Urinary retention  Assessment & Plan  Chronic urinary retention with catheter  Was recently seen in the emergency department on 9/30/2019 where she was started on ciprofloxacin  Urine culture at that time e coli  Will discontinue further antibiotics as patient has received three days of effective treatment      History of cerebral aneurysm repair  Assessment & Plan  History of cerebral aneurysm status post clipping in 1996  Subsequently has migraine headaches  Patient does follow up with [facility] and is maintained on carbamazepine and depakote     Will trial fioricet      PAD (peripheral artery disease) ([facility] [address] )  Assessment & Plan  Peripheral artery disease status post lower extremity stenting by cardiology and subsequently vascular surgery  No evidence of claudication at this time  Continue clopidogrel and atorvastatin     Hypothyroidism  Assessment & Plan  Hypothyroidism continue levothyroxine and check TSH     Essential hypertension  Assessment & Plan  Essential hypertension continue metoprolol and diltiazem used for rate control     Stage 3 chronic kidney disease ([facility] [address] )  Assessment & Plan  CKD 3 patient did see her nephrologist today in [place]    Monitor with IV lasix      Results from last 7 days   Lab Units 10/03/19  1532 09/30/19 2008   BUN mg/dL 28* 33*   CREATININE mg/dL 1 61* 1 46*   EGFR ml/min/1 73sq m 32 35         Hyperlipidemia  Assessment & Plan  Hyperlipidemia  Continue atorvastatin      Dementia ([address] )  Assessment & Plan  Dementia without behavioral disturbance  Patient does have underlying depression and mood currently stable on wellbutrin and lorazepam      VTE Prophylaxis: Warfarin (Coumadin)  Code Status:  Level one full code  Anticipated Length of Stay:  Patient will be admitted on an Inpatient basis with an anticipated length of stay of  greater than 2 midnights         ED Triage Vitals   Temperature Pulse Respirations Blood Pressure SpO2   10/03/19 1507 10/03/19 1507 10/03/19 1507 10/03/19 1507 10/03/19 1507   98 °F (36 7 °C) (!) 129 (!) 24 133/93 91 %      Temp Source Heart Rate Source Patient Position - Orthostatic VS BP Location FiO2 (%)   10/03/19 1507 10/03/19 1800 10/03/19 1507 10/03/19 1507 --   Oral Monitor Lying Right arm       Pain Score       10/03/19 2100       No Pain        Wt Readings from Last 1 Encounters:   10/04/19 81 3 kg (179 lb 3 7 oz)     Additional Vital Signs:   Date/Time Temp Pulse Resp BP SpO2 O2 Device Patient Position - Orthostatic VS   10/04/19 07:36:54 98 2 °F (36 8 °C) 133Abnormal  18  94 %     10/04/19 0700  133Abnormal  18 99/69 94 % None (Room air) Sitting   10/04/19 0300 97 9 °F (36 6 °C) 110Abnormal  17 116/72 96 % None (Room air) Lying   10/03/19 2300 98 °F (36 7 °C) 117Abnormal  19 138/61 96 % None (Room air) Lying   10/03/19 2200 98 1 °F (36 7 °C) 131Abnormal  18 145/72 95 % None (Room air) Lying   10/03/19 2100 98 5 °F (36 9 °C) 133Abnormal  18 122/86 96 % Nasal cannula Lying   10/03/19 2000  128Abnormal  18 190/116Abnormal  94 % Nasal cannula Lying   10/03/19 1900  71 18 144/74 92 % None (Room air)    10/03/19 1832  69 20 142/90 92 % Nasal cannula Lying   10/03/19 1800  127Abnormal  20 144/97 95 % None (Room air) Lying   10/03/19 1745  129Abnormal  20 142/97 94 % None (Room air) Lying   10/03/19 1600  127Abnormal  20 153/91 91 % None (Room air) Lying   10/03/19 1555  127Abnormal  20 141/94        Pertinent Labs/Diagnostic Test Results:   EKG:  Atrial flutter  Nonspecific ST and T wave abnormality  Abnormal ECG  CXR 10/4:  Cardiomegaly   Minimal prominence of the pulmonary vessels  ECHO 10/4:  LEFT VENTRICLE: Size was normal  Systolic function was mildly reduced  Ejection fraction was estimated to be 45 %  There was mild diffuse hypokinesis  Wall thickness was normal  DOPPLER: The study was not technically sufficient to allow  evaluation of LV diastolic function  RIGHT VENTRICLE: The size was normal  Systolic function was normal  Wall thickness was normal   MITRAL VALVE: Valve structure was normal  There was normal leaflet separation  DOPPLER: The transmitral velocity was within the normal range  There was no evidence for stenosis  There was trace regurgitation  AORTIC VALVE: The valve was not well visualized  DOPPLER: Transaortic velocity was within the normal range  There was no evidence for stenosis  There was mild regurgitation  TRICUSPID VALVE: Not well visualized  DOPPLER: The transtricuspid velocity was within the normal range  There was no evidence for stenosis  There was trace regurgitation  Pulmonary artery systolic pressure was within the normal range  Estimated peak PA pressure was 32 mmHg  PULMONIC VALVE: Not well visualized  DOPPLER: The transpulmonic velocity was within the normal range  There was trace regurgitation  PERICARDIUM: There was no pericardial effusion  The pericardium was normal in appearance  AORTA: The root exhibited normal size  SYSTEMIC VEINS: IVC: The inferior vena cava was dilated   Respirophasic changes were normal      Results from last 7 days   Lab Units 10/04/19  0517 10/03/19  1532 09/30/19 2008   WBC Thousand/uL 6 70 6 86 5 74   HEMOGLOBIN g/dL 11 9 12 4 12 7 HEMATOCRIT % 37 0 38 8 39 8   PLATELETS Thousands/uL 146* 171 163   NEUTROS ABS Thousands/µL  --  4 36 2 83         Results from last 7 days   Lab Units 10/04/19  0518 10/03/19  1532 09/30/19 2008   SODIUM mmol/L 146* 147* 147*   POTASSIUM mmol/L 4 0 3 8 4 3   CHLORIDE mmol/L 109* 109* 110*   CO2 mmol/L 28 27 30   ANION GAP mmol/L 9 11 7   BUN mg/dL 27* 28* 33*   CREATININE mg/dL 1 56* 1 61* 1 46*   EGFR ml/min/1 73sq m 33 32 35   CALCIUM mg/dL 9 0 9 1 9 3   MAGNESIUM mg/dL  --  1 9 2 2     Results from last 7 days   Lab Units 10/04/19  0518 10/03/19  1532 09/30/19 2008   AST U/L 29 32 22   ALT U/L 30 34 19   ALK PHOS U/L 55 61 57   TOTAL PROTEIN g/dL 6 8 7 3 7 3   ALBUMIN g/dL 3 1* 3 4* 3 4*   TOTAL BILIRUBIN mg/dL 0 40 0 30 0 20         Results from last 7 days   Lab Units 10/04/19  0518 10/03/19  1532 09/30/19 2008   GLUCOSE RANDOM mg/dL 127 162* 98       Results from last 7 days   Lab Units 10/03/19  1532 09/30/19 2008   TROPONIN I ng/mL <0 02 <0 02     Results from last 7 days   Lab Units 10/04/19  0518 10/03/19  1532   PROTIME seconds 27 2* 30 9*   INR  2 49* 2 92*   PTT seconds  --  41*     Results from last 7 days   Lab Units 10/03/19  1532   TSH 3RD GENERATON uIU/mL 2 351                     Results from last 7 days   Lab Units 10/03/19  1532   NT-PRO BNP pg/mL 12,447*       Results from last 7 days   Lab Units 09/30/19  1952   CLARITY UA  Clear   COLOR UA  Yellow   SPEC GRAV UA  1 020   PH UA  6 0   GLUCOSE UA mg/dl Negative   KETONES UA mg/dl Negative   BLOOD UA  Moderate*   PROTEIN UA mg/dl Trace*   NITRITE UA  Positive*   BILIRUBIN UA  Negative   UROBILINOGEN UA E U /dl 0 2   LEUKOCYTES UA  Moderate*   WBC UA /hpf 10-20*   RBC UA /hpf 10-20*   BACTERIA UA /hpf Moderate*   EPITHELIAL CELLS WET PREP /hpf Occasional     Results from last 7 days   Lab Units 09/30/19 1952   URINE CULTURE  >100,000 cfu/ml Escherichia coli*  40,000-49,000 cfu/ml      ED Treatment:   Medication Administration from 10/03/2019 1455 to 10/03/2019 2118       Date/Time Order Dose Route Action     10/03/2019 1740 furosemide (LASIX) injection 60 mg 60 mg Intravenous Given     10/03/2019 1740 potassium chloride (K-DUR,KLOR-CON) CR tablet 40 mEq 40 mEq Oral Given     10/03/2019 1740 metoprolol (LOPRESSOR) injection 5 mg 5 mg Intravenous Given     10/03/2019 1826 diltiazem (CARDIZEM) injection 22 5 mg 22 5 mg Intravenous Given     10/03/2019 2037 butalbital-acetaminophen-caffeine (FIORICET,ESGIC) -40 mg per tablet 1 tablet 1 tablet Oral Given        Past Medical History:   Diagnosis Date    Atrial fibrillation ([place] [address] )     Brain aneurysm     CAD (coronary artery disease)     Cardiac disease     had stents 12 years ago in [facility]    CKD (chronic kidney disease) stage 3, GFR 30-59 ml/min ([facility] ckd    COPD (chronic obstructive pulmonary disease) ([facility]     Dementia ([facility])     Diastolic CHF, chronic ([facility])     GERD (gastroesophageal reflux disease)     Hyperlipidemia     Hyperlipidemia     Hypertension     Hypothyroidism     Migraine     PAD (peripheral artery disease) ([facility]     Renal disorder     Seizures ([place] [address] )     Stroke [facility])      Admitting Diagnosis: Atrial flutter ([facility]) [I48 92]  Shortness of breath [R06 02]  Acute CHF (congestive heart failure) ([place] [address] ) [I50 9]  Age/Sex: 69 y o  female  Admission Orders:  I/O, tele  PT/INR    Current Facility-Administered Medications:  acetaminophen 650 mg Oral Q6H PRN   albuterol 2 puff Inhalation Q6H PRN   amiodarone (CORDARONE) IV bolus 150 mg Intravenous Once   Followed by      amiodarone 1 mg/min Intravenous Continuous   Followed by      amiodarone 0 5 mg/min Intravenous Continuous   atorvastatin 10 mg Oral Daily   buPROPion 150 mg Oral Daily   butalbital-acetaminophen-caffeine 1 tablet Oral Q4H PRN   carBAMazepine 100 mg Oral TID   cholecalciferol 1,000 Units Oral Daily   clopidogrel 75 mg Oral Daily   diltiazem 10 mg Intravenous Q4H PRN   divalproex sodium 250 mg Oral BID   ferrous sulfate 325 mg Oral BID   fluticasone-vilanterol 1 puff Inhalation Daily   furosemide 40 mg Intravenous BID (diuretic)   levothyroxine 88 mcg Oral Daily   LORazepam 0 5 mg Oral BID   metoprolol tartrate 25 mg Oral Q12H TONI   ondansetron 4 mg Intravenous Q4H PRN   potassium chloride 20 mEq Oral Daily   saccharomyces boulardii 250 mg Oral BID   senna-docusate sodium 1 tablet Oral Daily PRN   warfarin 3 mg Oral Daily (warfarin)       IP CONSULT TO CARDIOLOGY    Network Utilization Review Department  Phone: [phone]; Fax [phone]  [email or web address]  ATTENTION: Please call with any questions or concerns to [phone]  and carefully listen to the prompts so that you are directed to the right person  Send all requests for admission clinical reviews, approved or denied determinations and any other requests to fax [phone]   All voicemails are confidential

## 2019-10-04 NOTE — ASSESSMENT & PLAN NOTE
Wt Readings from Last 3 Encounters:   10/04/19 81 3 kg (179 lb 3 7 oz)   09/30/19 88 3 kg (194 lb 10 7 oz)   07/18/19 83 5 kg (184 lb)     · Acute on chronic diastolic congestive heart failure on admit  Patient has had approximately 10 lb weight gain  · Last echo 12/17  Repeat pending  · CXR showing cardiomegaly  Pro-BNP elevated 12k  Troponin <0 02  · Continue IV furosemide  · Cardiology consulted  · Daily weights, strict I/Os, low sodium diet  Consider fluid restriction

## 2019-10-04 NOTE — ASSESSMENT & PLAN NOTE
CKD 3 patient did see her nephrologist today in [place]  Monitor with IV lasix      Results from last 7 days   Lab Units 10/03/19  1532 09/30/19 2008   BUN mg/dL 28* 33*   CREATININE mg/dL 1 61* 1 46*   EGFR ml/min/1 73sq m 32 35

## 2019-10-04 NOTE — ASSESSMENT & PLAN NOTE
Wt Readings from Last 3 Encounters:   09/30/19 88 3 kg (194 lb 10 7 oz)   07/18/19 83 5 kg (184 lb)   07/11/19 83 kg (183 lb)     Acute on chronic diastolic congestive heart failure; last echocardiogram December 2017  Will recheck echocardiogram and continue IV furosemide  Patient has had approximately 10 lb weight gain  Decompensation of CHF may be tachycardia related as patient was tachycardic during ER admission several days ago for UTI and is tachycardic today requiring IV diltiazem

## 2019-10-04 NOTE — ASSESSMENT & PLAN NOTE
· CKD 3 patient did see her nephrologist 10/3 in [place]  Stable with IV lasix      Results from last 7 days   Lab Units 10/04/19  0518 10/03/19  1532 09/30/19 2008   BUN mg/dL 27* 28* 33*   CREATININE mg/dL 1 56* 1 61* 1 46*   EGFR ml/min/1 73sq m 33 32 35

## 2019-10-04 NOTE — RESPIRATORY THERAPY NOTE
RT Protocol Note  Kai Stuart 69 y o  female MRN: [id number]  Unit/Bed#: -01 Encounter: [id number]    Assessment    Principal Problem:    Acute on chronic diastolic congestive heart failure (HCC)  Active Problems:    Atrial flutter (HCC)    Dementia (HCC)    Hyperlipidemia    Stage 3 chronic kidney disease (HCC)    Essential hypertension    Hypothyroidism    PAD (peripheral artery disease) ([facility])    History of cerebral aneurysm repair      Home Pulmonary Medications:  Albuterol PRN  Home Devices/Therapy: Home O2     2L NC PRN    Past Medical History:   Diagnosis Date    Atrial fibrillation ([facility] )     Brain aneurysm     CAD (coronary artery disease)     Cardiac disease     had stents 12 years ago in [facility]    CKD (chronic kidney disease) stage 3, GFR 30-59 ml/min ([facility]) ckd    COPD (chronic obstructive pulmonary disease) ([facility])     Dementia ([facility])     Diastolic CHF, chronic ([facility])     GERD (gastroesophageal reflux disease)     Hyperlipidemia     Hyperlipidemia     Hypertension     Hypothyroidism     Migraine     PAD (peripheral artery disease) ([facility])     Renal disorder     Seizures ([facility])     Stroke ([facility] )      Social History     Socioeconomic History    Marital status:      Spouse name: None    Number of children: 4    Years of education: None    Highest education level: None   Occupational History    Occupation: retired   Social Needs    Financial resource strain: None    Food insecurity:     Worry: None     Inability: None    Transportation needs:     Medical: None     Non-medical: None   Tobacco Use    Smoking status: Former Smoker     Packs/day: 5 00     Years: 40 00     Pack years: 200 00     Last attempt to quit: 2007     Years since quittin 0    Smokeless tobacco: Never Used   Substance and Sexual Activity    Alcohol use: No     Alcohol/week: 0 0 standard drinks    Drug use: No    Sexual activity: Not Currently   Lifestyle    Physical activity: Days per week: None     Minutes per session: None    Stress: None   Relationships    Social connections:     Talks on phone: None     Gets together: None     Attends Scientologist service: None     Active member of club or organization: None     Attends meetings of clubs or organizations: None     Relationship status: None    Intimate partner violence:     Fear of current or ex partner: None     Emotionally abused: None     Physically abused: None     Forced sexual activity: None   Other Topics Concern    None   Social History Narrative    None       Subjective  Pt offers no respiratory complaints at this time  Objective    Physical Exam:   Assessment Type: Assess only  General Appearance: Awake, Alert  Respiratory Pattern: Normal  Chest Assessment: Chest expansion symmetrical  Bilateral Breath Sounds: Diminished  O2 Device: 2L NC    Vitals:  Blood pressure 122/86, pulse (!) 131, temperature 98 5 °F (36 9 °C), temperature source Oral, resp  rate 18, SpO2 95 %  Imaging and other studies: I have personally reviewed pertinent reports        O2 Device: 2L NC     Plan    Respiratory Plan: Home Bronchodilator Patient pathway     Ventcarmen VILLALPANDON

## 2019-10-04 NOTE — PHYSICAL THERAPY NOTE
Physical Therapy Cancellation Note    PT order received  Chart review performed  At this time, PT evaluation cancelled secondary to patient presents with unstable HR per chart review; patient not medically appropriate to participate at this time  PT will follow and evaluate as appropriate      Tanner Monaco, PT, DPT

## 2019-10-04 NOTE — ASSESSMENT & PLAN NOTE
· Chronic urinary retention with catheter  Was recently seen in the emergency department on 9/30/2019 where she was started on ciprofloxacin  Urine culture at that time showed E coli  · Did discontinue further antibiotics as patient has received three days of effective treatment as of admit

## 2019-10-04 NOTE — ASSESSMENT & PLAN NOTE
Peripheral artery disease status post lower extremity stenting by cardiology and subsequently vascular surgery  No evidence of claudication at this time    Continue clopidogrel and atorvastatin

## 2019-10-04 NOTE — CONSULTS
Consultation - Cardiology   Lola Lee 69 y o  female MRN: [id number]  Unit/Bed#: -01 Encounter: [id number]  10/04/19  8:39 AM    Assessment/ Plan:  1- Paroxysmal atrial flutter with RVR, HR 120s and non-responsive to Cardizem  Will discontinue at this time and reduce Lopressor to 25 mg BID to allow BP to trend up  Spoke to patient and patient's daughter and given treatment options regarding atrial flutter to include ADRIENNE/DCCV vs medical management  Given patient has had problems regarding anesthesia in the past, they would like to avoid procedures requiring sedation  In light of this, we will start IV Amiodarone load today (intial dose 150mg x1 followed by 1mg/hr x8hrs followed by 0 5mg/hr x16hrs)  Continue Coumadin for anticoagulation  Goal INR 2-3, therapeutic today 2 49  Continue to monitor rates on telemetry for improvement  2- Acute on chronic diastolic congestive heart failure  Continue IV Lasix 40 mg BID  TTE ordered to assess LVEF  Salt restrictions, daily weights, strict I&Os  3- Coronary artery disease s/p PCI  Denies CP or anginal equivalent  Continue Plavix, statin and beta-blocker  4- Hypertension, soft, Cardizem discontinued, Lopressor reduced  Continue to monitor on amiodarone  5- Hyperlipidemia on atorvastatin 10 mg daily  6- Peripheral arterial disease with stents  Continue Plavix and warfarin  7- Cerebral aneurysm s/p clipping  Continue Plavix and warfarin  8- Hypothyroidism on levothyroxine  TSH - 2 3  She will need serial monitoring of her thyroid functions as outpatient with amiodarone  9- CKD 3, creatinine 1 56  Continue to trend  10- Sleep apnea noncompliant with CPAP, uses O2 via NC throughout the day  History of Present Illness   Physician Requesting Consult: Marge Daigle MD  Reason for Consult / Principal Problem:  Acute CHF, atrial flutter with RVR  HPI: Lola Lee is a 69y o  year old female previously known by Dr Jasmin Veliz    who presents with increasing shortness of breath, chest heaviness and weight gain 10 lb per daughter  She called her cardiologist who instructed her to come to the ED for further evaluation  Patient is poor historian given baseline dementia, history obtained per chart review  On admission, she was found to be in atrial flutter with RVR  She received 22 5 mg IV Cardizem x1 and IV Lopressor 5mg x1 in the ED and PO Cardizem was increased from 30 mg TID to 90 mg BID without significant response  Weight on admission was 194, reports dry weight of 183  He patient workup included chest x-ray which showed minimal prominence of pulmonary vessels  [place] [address]  Initial troponin was negative  Inpatient consult to Cardiology  Consult performed by: Flower Parmar PA-C  Consult ordered by: Susan Musa DO      EKG:  Atrial flutter, nonspecific ST and T-wave abnormality  Review of Systems   Constitutional: Positive for fatigue and unexpected weight change  Negative for appetite change, chills, diaphoresis and fever  Respiratory: Positive for chest tightness and shortness of breath  Negative for cough  Cardiovascular: Negative for chest pain, palpitations and leg swelling  Gastrointestinal: Negative for diarrhea, nausea and vomiting  Endocrine: Negative for cold intolerance and heat intolerance  Genitourinary: Negative for difficulty urinating, dysuria and enuresis  Musculoskeletal: Negative for arthralgias, back pain and gait problem  Allergic/Immunologic: Negative for environmental allergies and food allergies  Neurological: Negative for dizziness, facial asymmetry and headaches  Hematological: Negative for adenopathy  Does not bruise/bleed easily  Psychiatric/Behavioral: Negative for agitation, behavioral problems and confusion         Historical Information   Past Medical History:   Diagnosis Date    Atrial fibrillation ([facility] [address] )     Brain aneurysm     CAD (coronary artery disease)     Cardiac disease     had stents 12 years ago in [address] CKD (chronic kidney disease) stage 3, GFR 30-59 ml/min (HCC) ckd    COPD (chronic obstructive pulmonary disease) (HCC)     Dementia (HCC)     Diastolic CHF, chronic (HCC)     GERD (gastroesophageal reflux disease)     Hyperlipidemia     Hyperlipidemia     Hypertension     Hypothyroidism     Migraine     PAD (peripheral artery disease) (HCC)     Renal disorder     Seizures (HCC)     Stroke [facility])      Past Surgical History:   Procedure Laterality Date    APPENDECTOMY      ARTERIOGRAM N/A 2018    Procedure: ARTERIOGRAM WITH STENT PLACEMENT;  Surgeon: Evita Reddy MD;  [facility];  Service: Vascular    BLADDER TUMOR EXCISION      BRAIN SURGERY        clip right frontal lobe    CARDIAC SURGERY      COLONOSCOPY      CORONARY STENT PLACEMENT      5 stents    EYE SURGERY      IR ABDOMINAL ANGIOGRAPHY / INTERVENTION  2018    MANDIBLE FRACTURE SURGERY      TONSILLECTOMY      TUBAL LIGATION      UTERINE FIBROID SURGERY       Social History     Substance and Sexual Activity   Alcohol Use Never    Alcohol/week: 0 0 standard drinks    Frequency: Never     Social History     Substance and Sexual Activity   Drug Use No     Social History     Tobacco Use   Smoking Status Former Smoker    Packs/day: 5 00    Years: 40 00    Pack years: 200 00    Last attempt to quit: 2007    Years since quittin 0   Smokeless Tobacco Never Used       Family History:   Family History   Problem Relation Age of Onset    Heart disease Father     Parkinsonism Father     Macular degeneration Mother     Glaucoma Mother     Diabetes Brother     Heart disease Brother        Meds/Allergies   current meds:   Current Facility-Administered Medications   Medication Dose Route Frequency    acetaminophen (TYLENOL) tablet 650 mg  650 mg Oral Q6H PRN    albuterol (PROVENTIL HFA,VENTOLIN HFA) inhaler 2 puff  2 puff Inhalation Q6H PRN    atorvastatin (LIPITOR) tablet 10 mg  10 mg Oral Daily    buPROPion (WELLBUTRIN SR) 12 hr tablet 150 mg  150 mg Oral Daily    butalbital-acetaminophen-caffeine (FIORICET,ESGIC) -40 mg per tablet 1 tablet  1 tablet Oral Q4H PRN    carBAMazepine (TEGretol) chewable tablet 100 mg  100 mg Oral TID    cholecalciferol (VITAMIN D3) tablet 1,000 Units  1,000 Units Oral Daily    clopidogrel (PLAVIX) tablet 75 mg  75 mg Oral Daily    diltiazem (CARDIZEM SR) 12 hr capsule 90 mg  90 mg Oral Q12H [address]    diltiazem (CARDIZEM) injection 10 mg  10 mg Intravenous Q4H PRN    divalproex sodium (DEPAKOTE ER) 24 hr tablet 250 mg  250 mg Oral BID    ferrous sulfate tablet 325 mg  325 mg Oral BID    fluticasone-vilanterol (BREO ELLIPTA) 100-25 mcg/inh inhaler 1 puff  1 puff Inhalation Daily    furosemide (LASIX) injection 40 mg  40 mg Intravenous BID (diuretic)    levothyroxine tablet 88 mcg  88 mcg Oral Daily    LORazepam (ATIVAN) tablet 0 5 mg  0 5 mg Oral BID    metoprolol tartrate (LOPRESSOR) tablet 50 mg  50 mg Oral Q12H TONI    ondansetron (ZOFRAN) injection 4 mg  4 mg Intravenous Q4H PRN    potassium chloride (K-DUR,KLOR-CON) CR tablet 20 mEq  20 mEq Oral Daily    saccharomyces boulardii (FLORASTOR) capsule 250 mg  250 mg Oral BID    senna-docusate sodium (SENOKOT S) 8 6-50 mg per tablet 1 tablet  1 tablet Oral Daily PRN    warfarin (COUMADIN) tablet 3 mg  3 mg Oral Daily (warfarin)     Allergies   Allergen Reactions    Spiriva Handihaler [Tiotropium Bromide Monohydrate] Swelling    Ramipril Facial Swelling    Penicillins      unsure       Objective   Vitals: Blood pressure 99/69, pulse (!) 133, temperature 98 2 °F (36 8 °C), resp  rate 18, weight 81 3 kg (179 lb 3 7 oz), SpO2 94 %  , Body mass index is 32 78 kg/m² ,   Orthostatic Blood Pressures      Most Recent Value   Blood Pressure  99/69 filed at 10/04/2019 0700   Patient Position - Orthostatic VS  Sitting filed at 10/04/2019 2204          Systolic (01DKX), CKB:830 , Min:99 , VQB:052     Diastolic (72RFJ), RACHNA:20, Min:61, Max:116        Intake/Output Summary (Last 24 hours) at 10/4/2019 0839  Last data filed at 10/4/2019 0529  Gross per 24 hour   Intake    Output 650 ml   Net -650 ml       Invasive Devices     Peripheral Intravenous Line            Peripheral IV 10/03/19 Right Antecubital less than 1 day          Drain            Urethral Catheter Double-lumen 16 Fr  268 days    Urethral Catheter 196 days                    Physical Exam:  GEN: Alert and oriented x 3, in no acute distress  Well appearing and well nourished  HEENT: Sclera anicteric, conjunctivae pink, mucous membranes moist  Oropharynx clear  NECK: Supple, no carotid bruits, no significant JVD  Trachea midline, no thyromegaly  HEART: Irregular rhythm, normal S1 and S2, no murmurs, clicks, gallops or rubs  PMI nondisplaced, no thrills  LUNGS: Clear to auscultation bilaterally; no wheezes, rales, or rhonchi  No increased work of breathing or signs of respiratory distress  ABDOMEN: Soft, nontender, nondistended, normoactive bowel sounds  EXTREMITIES: Skin warm and well perfused, no clubbing, cyanosis, or edema  NEURO: No focal findings  Normal speech  Mood and affect normal    SKIN: Normal without suspicious lesions on exposed skin        Lab Results:     Troponins:   Results from last 7 days   Lab Units 10/03/19  1532 09/30/19  2008   TROPONIN I ng/mL <0 02 <0 02       CBC with diff:   Results from last 7 days   Lab Units 10/04/19  0517 10/03/19  1532 09/30/19  2008   WBC Thousand/uL 6 70 6 86 5 74   HEMOGLOBIN g/dL 11 9 12 4 12 7   HEMATOCRIT % 37 0 38 8 39 8   MCV fL 105* 107* 108*   PLATELETS Thousands/uL 146* 171 163   MCH pg 33 9 34 1 34 3   MCHC g/dL 32 2 32 0 31 9   RDW % 13 2 13 1 13 0   MPV fL 12 1 12 3 11 8   NRBC AUTO /100 WBCs  --  0 0         CMP:   Results from last 7 days   Lab Units 10/04/19  0518 10/03/19  1532 09/30/19  2008   POTASSIUM mmol/L 4 0 3 8 4 3   CHLORIDE mmol/L 109* 109* 110*   CO2 mmol/L 28 27 30   BUN mg/dL 27* 28* 33*   CREATININE mg/dL 1 56* 1 61* 1 46*   CALCIUM mg/dL 9 0 9 1 9 3   AST U/L 29 32 22   ALT U/L 30 34 19   ALK PHOS U/L 55 61 57   EGFR ml/min/1 73sq m 33 32 35

## 2019-10-04 NOTE — ASSESSMENT & PLAN NOTE
· Dementia without behavioral disturbance    Patient does have underlying depression and mood currently stable on wellbutrin and lorazepam

## 2019-10-04 NOTE — ASSESSMENT & PLAN NOTE
Atrial fibrillation/flutter with RVR requiring IV diltiazem in the emergency department  Previously was on upwards of diltiazem  mg b i d  and titrated down during last cardiology visit to 30 mg t i d   Will restart diltiazem SR at 90 mg b i d  and monitor with p r n  diltiazem and have cardiology evaluate

## 2019-10-04 NOTE — PHYSICIAN ADVISOR
Current patient class: Inpatient  The patient is currently on Hospital Day: 2 at [address]       The patient is  documented to require at least a 2nd midnight in the hospital  As such the patient is  expected to satisfy the 2 midnight benchmark and is therefore eligible for inpatient admission  After review of the relevant documentation, labs, vital signs and test results, the patient is appropriate for INPATIENT ADMISSION  Admission to the hospital as an inpatient is a complex decision making process which requires the practitioner to consider the patients presenting complaint, history and physical examination and all relevant testing  With this in mind, in this case, the patient was deemed appropriate for INPATIENT ADMISSION  After review of the documentation and testing available at the time of the admission I concur with this clinical determination of medical necessity  Rationale is as follows: The patient is a 69 yrs Female who presented to the ED at 10/3/2019  3:21 PM with a chief complaint of Shortness of Breath (x 2 days; chest heaviness)   Patient admitted for acute on chronic congestive heart failure  She has had approximately 10 lb weight gain, she was tachycardic and tachypneic on admission  She was found to be in atrial fibrillation with rapid ventricular rate and required IV diltiazem in the emergency room-labs showed elevated proBNP, hypernatremia, chronic kidney disease    She was started on IV diuretic with serial monitoring of renal function and electrolytes  She was seen in consultation by Cardiology service-given paroxysmal a flutter with RVR-heart rate in 120 he is nonresponsive to Cardizem she was started on IV amiodarone infusion-IV diuretics were continued-she is therefore appropriate for inpatient admission  The patients vitals on arrival were ED Triage Vitals   Temperature Pulse Respirations Blood Pressure SpO2   10/03/19 1507 10/03/19 1507 10/03/19 1507 10/03/19 1507 10/03/19 1507   98 °F (36 7 °C) (!) 129 (!) 24 133/93 91 %      Temp Source Heart Rate Source Patient Position - Orthostatic VS BP Location FiO2 (%)   10/03/19 1507 10/03/19 1800 10/03/19 1507 10/03/19 1507 --   Oral Monitor Lying Right arm       Pain Score       10/03/19 2100       No Pain           Past Medical History:   Diagnosis Date    Atrial fibrillation ([facility] [address] )     Brain aneurysm     CAD (coronary artery disease)     Cardiac disease     had stents 12 years ago in [facility]    CKD (chronic kidney disease) stage 3, GFR 30-59 ml/min ([facility]) ckd    COPD (chronic obstructive pulmonary disease) ([facility])     Dementia ([facility])     Diastolic CHF, chronic ([facility])     GERD (gastroesophageal reflux disease)     Hyperlipidemia     Hyperlipidemia     Hypertension     Hypothyroidism     Migraine     PAD (peripheral artery disease) ([facility])     Renal disorder     Seizures ([facility] [address] )     Stroke [facility])      Past Surgical History:   Procedure Laterality Date    APPENDECTOMY      ARTERIOGRAM N/A 12/7/2018    Procedure: ARTERIOGRAM WITH STENT PLACEMENT;  Surgeon: Manohar Reddy MD;  [facility];  Service: Vascular    BLADDER TUMOR EXCISION     [address]  clip right frontal lobe    CARDIAC SURGERY      COLONOSCOPY      CORONARY STENT PLACEMENT      5 stents    EYE SURGERY      IR ABDOMINAL ANGIOGRAPHY / INTERVENTION  12/7/2018    MANDIBLE FRACTURE SURGERY      TONSILLECTOMY      TUBAL LIGATION      UTERINE FIBROID SURGERY         The patient was admitted to the hospital at 1901 on 10/3/19 for the following diagnosis:  Atrial flutter ([facility] [address] ) [I48 92]  Shortness of breath [R06 02]  Acute CHF (congestive heart failure) ([facility] [address] ) [I50 9]    Consults have been placed to:   IP CONSULT TO CARDIOLOGY    Vitals:    10/04/19 0600 10/04/19 0700 10/04/19 0736 10/04/19 1500   BP:  99/69  100/60   BP Location:  Left arm     Pulse:  (!) 133 (!) 133    Resp:  18 18    Temp:   98 2 °F (36 8 °C)    TempSrc:  Oral     SpO2:  94% 94%    Weight: 81 3 kg (179 lb 3 7 oz)          Most recent labs:    Recent Labs     10/03/19  1532 10/04/19  0517 10/04/19  0518   WBC 6 86 6 70  --    HGB 12 4 11 9  --    HCT 38 8 37 0  --     146*  --    K 3 8  --  4 0   CALCIUM 9 1  --  9 0   BUN 28*  --  27*   CREATININE 1 61*  --  1 56*   INR 2 92*  --  2 49*   TROPONINI <0 02  --   --    AST 32  --  29   ALT 34  --  30   ALKPHOS 61  --  55       Scheduled Meds:  Current Facility-Administered Medications:  acetaminophen 650 mg Oral Q6H PRN Pleasant Maple, DO    albuterol 2 puff Inhalation Q6H PRN Pleasant Maple, DO    amiodarone (CORDARONE) IV bolus 150 mg Intravenous Once Dale Riojas MD    Followed by        amiodarone 1 mg/min Intravenous Continuous Dale Riojas MD Last Rate: 1 mg/min (10/04/19 1521)   Followed by        amiodarone 0 5 mg/min Intravenous Continuous Dale Riojas MD    atorvastatin 10 mg Oral Daily Paolo Sage, DO    buPROPion 150 mg Oral Daily Pleasant Maple, DO    butalbital-acetaminophen-caffeine 1 tablet Oral Q4H PRN Pleasant Maple, DO    carBAMazepine 100 mg Oral TID Pleasant Maple, DO    cholecalciferol 1,000 Units Oral Daily Paolo Sage, DO    clopidogrel 75 mg Oral Daily Paolo Sage, DO    diltiazem 10 mg Intravenous Q4H PRN Pleasant Maple, DO    divalproex sodium 250 mg Oral BID Paolo Sage, DO    ferrous sulfate 325 mg Oral BID Paolo Sage, DO    fluticasone-vilanterol 1 puff Inhalation Daily Paolo Sage, DO    furosemide 40 mg Intravenous BID (diuretic) Pleasant Maple, DO    levothyroxine 88 mcg Oral Daily Paolo Sage, DO    LORazepam 0 5 mg Oral BID Paolo Sage, DO    metoprolol tartrate 25 mg Oral Q12H Ronald Mansfield MD    ondansetron 4 mg Intravenous Q4H PRN Pleasant Maple, DO    potassium chloride 20 mEq Oral Daily Paolo Sage, DO    saccharomyces boulardii 250 mg Oral BID Madhu Dupree DO    senna-docusate sodium 1 tablet Oral Daily IRASEMAN Mariel Garcia DO Sage    warfarin 3 mg Oral Daily (warfarin) Ran Andujar DO      Continuous Infusions:  amiodarone 1 mg/min Last Rate: 1 mg/min (10/04/19 1524)   Followed by     amiodarone 0 5 mg/min      PRN Meds:   acetaminophen    albuterol    butalbital-acetaminophen-caffeine    diltiazem    ondansetron    senna-docusate sodium    Surgical procedures (if appropriate):

## 2019-10-04 NOTE — ASSESSMENT & PLAN NOTE
· Atrial fibrillation/flutter with RVR requiring IV diltiazem in the emergency department  · Previously was on upwards of diltiazem  mg b i d  and titrated down during last cardiology visit to 30 mg t i d    · Increased to 90 mg BID on admit, will continue wtih holding parameters

## 2019-10-04 NOTE — PROGRESS NOTES
Progress Note Rigoberto Mcdermott 1945, 69 y o  female MRN: [id number]    Unit/Bed#: -01 Encounter: [id number]    Primary Care Provider: Leni Charles DO   Date and time admitted to hospital: 10/3/2019  3:21 PM        * Acute on chronic diastolic congestive heart failure [facility])  Assessment & Plan  Wt Readings from Last 3 Encounters:   10/04/19 81 3 kg (179 lb 3 7 oz)   09/30/19 88 3 kg (194 lb 10 7 oz)   07/18/19 83 5 kg (184 lb)     · Acute on chronic diastolic congestive heart failure on admit  Patient has had approximately 10 lb weight gain  · Last echo 12/17  Repeat pending  · CXR showing cardiomegaly  Pro-BNP elevated 12k  Troponin <0 02  · Continue IV furosemide  · Cardiology consulted  · Daily weights, strict I/Os, low sodium diet  Consider fluid restriction  Atrial flutter (HCC)  Assessment & Plan  · Atrial fibrillation/flutter with RVR requiring IV diltiazem in the emergency department  · Previously was on upwards of diltiazem  mg b i d  and titrated down during last cardiology visit to 30 mg t i d    · Increased to 90 mg BID on admit, will continue wtih holding parameters  Essential hypertension  Assessment & Plan  · Essential hypertension continue metoprolol and diltiazem used for rate control    PAD (peripheral artery disease) ([facility])  Assessment & Plan  · Peripheral artery disease status post lower extremity stenting  No evidence of claudication at this time  · Continue clopidogrel and atorvastatin  Hypothyroidism  Assessment & Plan  · Hypothyroidism, continue levothyroxine, normal TSH  Stage 3 chronic kidney disease ([facility] [address] )  Assessment & Plan  · CKD 3 patient did see her nephrologist 10/3 in [place]  Stable with IV lasix      Results from last 7 days   Lab Units 10/04/19  0518 10/03/19  1532 09/30/19 2008   BUN mg/dL 27* 28* 33*   CREATININE mg/dL 1 56* 1 61* 1 46*   EGFR ml/min/1 73sq m 33 32 35       Urinary retention  Assessment & Plan  · Chronic urinary retention with catheter  Was recently seen in the emergency department on 9/30/2019 where she was started on ciprofloxacin  Urine culture at that time showed E coli  · Did discontinue further antibiotics as patient has received three days of effective treatment as of admit  Hyperlipidemia  Assessment & Plan  · Hyperlipidemia  Continue atorvastatin  History of cerebral aneurysm repair  Assessment & Plan  · History of cerebral aneurysm status post clipping in 1996  Subsequently has migraine headaches  Patient does follow up with [address] [place] [address] Neurology and is maintained on carbamazepine and depakote  Dementia [facility])  Assessment & Plan  · Dementia without behavioral disturbance  Patient does have underlying depression and mood currently stable on wellbutrin and lorazepam         VTE Pharmacologic Prophylaxis:   Pharmacologic: Warfarin (Coumadin)  Mechanical VTE Prophylaxis in Place: Yes    Patient Centered Rounds: I have performed bedside rounds with nursing staff today  Discussions with Specialists or Other Care Team Provider: cardiology     Education and Discussions with Family / Patient: patient     Time Spent for Care: 20 minutes  More than 50% of total time spent on counseling and coordination of care as described above  Current Length of Stay: 1 day(s)    Current Patient Status: Inpatient   Certification Statement: The patient will continue to require additional inpatient hospital stay due to IV diuresis, afib control    Discharge Plan: not medically stable for discharge, pending improvement     Code Status: Level 1 - Full Code      Subjective:   Patient seen earlier this morning, she is alert and oriented to self as well as knowing that she is in the hospital   When asked about her medical conditions, she defers to her daughter who she states knows about her medical care    Presently denies chest pain but feels little palpitation which she describes as "slowing"as well as mild shortness of breath  Reports no discomfort from her chronic Davis catheter  Denies subjective fevers overnight    Objective:     Vitals:   Temp (24hrs), Av 1 °F (36 7 °C), Min:97 9 °F (36 6 °C), Max:98 5 °F (36 9 °C)    Temp:  [97 9 °F (36 6 °C)-98 5 °F (36 9 °C)] 98 2 °F (36 8 °C)  HR:  [] 133  Resp:  [17-24] 18  BP: ()/() 99/69  SpO2:  [91 %-96 %] 94 %  Body mass index is 32 78 kg/m²  Input and Output Summary (last 24 hours): Intake/Output Summary (Last 24 hours) at 10/4/2019 1315  Last data filed at 10/4/2019 0900  Gross per 24 hour   Intake 240 ml   Output 650 ml   Net -410 ml       Physical Exam:     Physical Exam   Constitutional: She appears well-developed and well-nourished  No distress  HENT:   Mouth/Throat: Oropharynx is clear and moist    Cardiovascular: S1 normal, S2 normal, normal heart sounds and intact distal pulses  An irregularly irregular rhythm present  Tachycardia present  No murmur heard  Pulmonary/Chest: Effort normal and breath sounds normal  No respiratory distress  She has no wheezes  She has no rales  Abdominal: Soft  Bowel sounds are normal  She exhibits no distension  There is no tenderness  Musculoskeletal: She exhibits edema  Neurological: She is alert  Skin: No rash noted  Psychiatric: She has a normal mood and affect  Nursing note and vitals reviewed        Additional Data:     Labs:    Results from last 7 days   Lab Units 10/04/19  0517 10/03/19  1532   WBC Thousand/uL 6 70 6 86   HEMOGLOBIN g/dL 11 9 12 4   HEMATOCRIT % 37 0 38 8   PLATELETS Thousands/uL 146* 171   NEUTROS PCT %  --  63   LYMPHS PCT %  --  23   MONOS PCT %  --  11   EOS PCT %  --  2     Results from last 7 days   Lab Units 10/04/19  0518   SODIUM mmol/L 146*   POTASSIUM mmol/L 4 0   CHLORIDE mmol/L 109*   CO2 mmol/L 28   BUN mg/dL 27*   CREATININE mg/dL 1 56*   ANION GAP mmol/L 9   CALCIUM mg/dL 9 0   ALBUMIN g/dL 3 1*   TOTAL BILIRUBIN mg/dL 0 40   ALK PHOS U/L 55 ALT U/L 30   AST U/L 29   GLUCOSE RANDOM mg/dL 127     Results from last 7 days   Lab Units 10/04/19  0518   INR  2 49*                       * I Have Reviewed All Lab Data Listed Above  * Additional Pertinent Lab Tests Reviewed:  [facility] Admission Reviewed    Imaging:    Imaging Reports Reviewed Today Include: CXR  Imaging Personally Reviewed by Myself Includes:  Tele - rapid afib 130s    Recent Cultures (last 7 days):     Results from last 7 days   Lab Units 09/30/19  1952   URINE CULTURE  >100,000 cfu/ml Escherichia coli*  40,000-49,000 cfu/ml        Last 24 Hours Medication List:     Current Facility-Administered Medications:  acetaminophen 650 mg Oral Q6H PRN Daniel Jones, DO   albuterol 2 puff Inhalation Q6H PRN Daniel Jones, DO   amiodarone (CORDARONE) IV bolus 150 mg Intravenous Once Fanny Diaz MD   Followed by       amiodarone 1 mg/min Intravenous Continuous Fanny Diaz MD   Followed by       amiodarone 0 5 mg/min Intravenous Continuous Fanny Diaz MD   atorvastatin 10 mg Oral Daily Paolo Cazares, DO   buPROPion 150 mg Oral Daily Paolo Cazares, DO   butalbital-acetaminophen-caffeine 1 tablet Oral Q4H PRN Daniel Jones, DO   carBAMazepine 100 mg Oral TID Daniel Jones, DO   cholecalciferol 1,000 Units Oral Daily Paolo Cazares, DO   clopidogrel 75 mg Oral Daily Paolo Cazares, DO   diltiazem 10 mg Intravenous Q4H PRN Daniel Jones, DO   divalproex sodium 250 mg Oral BID Paolo Cazares, DO   ferrous sulfate 325 mg Oral BID Paolo Cazares, DO   fluticasone-vilanterol 1 puff Inhalation Daily Paolo Cazares, DO   furosemide 40 mg Intravenous BID (diuretic) Daniel Jones, DO   levothyroxine 88 mcg Oral Daily Paolo Cazares, DO   LORazepam 0 5 mg Oral BID Paolo Cazares, DO   metoprolol tartrate 25 mg Oral Q12H Yohan Bowen MD   ondansetron 4 mg Intravenous Q4H PRN Paolo Cazares, DO   potassium chloride 20 mEq Oral Daily Paolo Cazares, DO   saccharomyces boulardii 250 mg Oral BID Erendira Morales DO   senna-docusate sodium 1 tablet Oral Daily PRN Erendira Morales DO   warfarin 3 mg Oral Daily (warfarin) Erendira Morales DO        Today, Patient Was Seen By: Ben Martínez PA-C    ** Please Note: Dictation voice to text software may have been used in the creation of this document   **

## 2019-10-04 NOTE — ASSESSMENT & PLAN NOTE
· Peripheral artery disease status post lower extremity stenting  No evidence of claudication at this time  · Continue clopidogrel and atorvastatin

## 2019-10-05 LAB
GLUCOSE SERPL-MCNC: 130 MG/DL (ref 65–140)
INR PPP: 2.88 (ref 0.84–1.19)
PROTHROMBIN TIME: 30.6 SECONDS (ref 11.6–14.5)

## 2019-10-05 PROCEDURE — 85610 PROTHROMBIN TIME: CPT | Performed by: PHYSICIAN ASSISTANT

## 2019-10-05 PROCEDURE — 99232 SBSQ HOSP IP/OBS MODERATE 35: CPT | Performed by: PHYSICIAN ASSISTANT

## 2019-10-05 PROCEDURE — 82948 REAGENT STRIP/BLOOD GLUCOSE: CPT

## 2019-10-05 PROCEDURE — 99232 SBSQ HOSP IP/OBS MODERATE 35: CPT | Performed by: INTERNAL MEDICINE

## 2019-10-05 RX ORDER — FUROSEMIDE 10 MG/ML
60 INJECTION INTRAMUSCULAR; INTRAVENOUS
Status: DISCONTINUED | OUTPATIENT
Start: 2019-10-05 | End: 2019-10-06

## 2019-10-05 RX ADMIN — WARFARIN SODIUM 3 MG: 3 TABLET ORAL at 18:45

## 2019-10-05 RX ADMIN — FUROSEMIDE 40 MG: 10 INJECTION, SOLUTION INTRAMUSCULAR; INTRAVENOUS at 08:53

## 2019-10-05 RX ADMIN — DIVALPROEX SODIUM 250 MG: 250 TABLET, FILM COATED, EXTENDED RELEASE ORAL at 18:45

## 2019-10-05 RX ADMIN — CARBAMAZEPINE 100 MG: 100 TABLET, CHEWABLE ORAL at 08:53

## 2019-10-05 RX ADMIN — METOPROLOL TARTRATE 25 MG: 25 TABLET, FILM COATED ORAL at 08:53

## 2019-10-05 RX ADMIN — Medication 250 MG: at 18:45

## 2019-10-05 RX ADMIN — BUPROPION HYDROCHLORIDE 150 MG: 150 TABLET, FILM COATED, EXTENDED RELEASE ORAL at 08:53

## 2019-10-05 RX ADMIN — METOPROLOL TARTRATE 25 MG: 25 TABLET, FILM COATED ORAL at 22:00

## 2019-10-05 RX ADMIN — LORAZEPAM 0.5 MG: 0.5 TABLET ORAL at 08:53

## 2019-10-05 RX ADMIN — CARBAMAZEPINE 100 MG: 100 TABLET, CHEWABLE ORAL at 18:45

## 2019-10-05 RX ADMIN — FERROUS SULFATE TAB 325 MG (65 MG ELEMENTAL FE) 325 MG: 325 (65 FE) TAB at 08:53

## 2019-10-05 RX ADMIN — LORAZEPAM 0.5 MG: 0.5 TABLET ORAL at 18:45

## 2019-10-05 RX ADMIN — FUROSEMIDE 60 MG: 10 INJECTION, SOLUTION INTRAMUSCULAR; INTRAVENOUS at 18:46

## 2019-10-05 RX ADMIN — DIVALPROEX SODIUM 250 MG: 250 TABLET, FILM COATED, EXTENDED RELEASE ORAL at 08:53

## 2019-10-05 RX ADMIN — AMIODARONE HYDROCHLORIDE 0.5 MG/MIN: 50 INJECTION, SOLUTION INTRAVENOUS at 16:32

## 2019-10-05 RX ADMIN — Medication 250 MG: at 08:53

## 2019-10-05 RX ADMIN — FERROUS SULFATE TAB 325 MG (65 MG ELEMENTAL FE) 325 MG: 325 (65 FE) TAB at 18:45

## 2019-10-05 RX ADMIN — ATORVASTATIN CALCIUM 10 MG: 10 TABLET, FILM COATED ORAL at 08:53

## 2019-10-05 RX ADMIN — ACETAMINOPHEN 650 MG: 325 TABLET, FILM COATED ORAL at 11:00

## 2019-10-05 RX ADMIN — LEVOTHYROXINE SODIUM 88 MCG: 88 TABLET ORAL at 05:27

## 2019-10-05 RX ADMIN — VITAMIN D, TAB 1000IU (100/BT) 1000 UNITS: 25 TAB at 08:53

## 2019-10-05 RX ADMIN — POTASSIUM CHLORIDE 20 MEQ: 1500 TABLET, EXTENDED RELEASE ORAL at 08:53

## 2019-10-05 RX ADMIN — CARBAMAZEPINE 100 MG: 100 TABLET, CHEWABLE ORAL at 22:00

## 2019-10-05 RX ADMIN — CLOPIDOGREL BISULFATE 75 MG: 75 TABLET ORAL at 08:53

## 2019-10-05 RX ADMIN — FLUTICASONE FUROATE AND VILANTEROL TRIFENATATE 1 PUFF: 100; 25 POWDER RESPIRATORY (INHALATION) at 08:54

## 2019-10-05 NOTE — ASSESSMENT & PLAN NOTE
· CKD 3 patient did see her nephrologist 10/3 in [place]  Stable with IV lasix      Results from last 7 days   Lab Units 10/04/19  0518 10/03/19  1532 09/30/19 2008   BUN mg/dL 27* 28* 33*   CREATININE mg/dL 1 56* 1 61* 1 46*   EGFR ml/min/1 73sq m 33 32 35

## 2019-10-05 NOTE — PROGRESS NOTES
Progress Note Hodges Hodgkins 1945, 69 y o  female MRN: [id number]    Unit/Bed#: -01 Encounter: [id number]    Primary Care Provider: Gabriele Cheatham DO   Date and time admitted to hospital: 10/3/2019  3:21 PM        * Acute on chronic diastolic congestive heart failure [facility])  Assessment & Plan  Wt Readings from Last 3 Encounters:   10/04/19 81 3 kg (179 lb 3 7 oz)   09/30/19 88 3 kg (194 lb 10 7 oz)   07/18/19 83 5 kg (184 lb)     · Acute on chronic diastolic congestive heart failure on admit  Patient has had approximately 10 lb weight gain by report  · Last echo 12/17  Repeat pending  · CXR showing cardiomegaly  Pro-BNP elevated 12k  Troponin <0 02  · Continue IV furosemide  · Cardiology consult appreciated  · Daily weights, strict I/Os, low sodium diet  Consider fluid restriction  Reinforced monitoring parameters with staff  Atrial flutter ([facility])  Assessment & Plan  · Atrial fibrillation/flutter with RVR requiring IV diltiazem in the emergency department  · Previously was on upwards of diltiazem  mg b i d  and titrated down during last cardiology visit to 30 mg t i d    · Increased to 90 mg BID on admit, cardiology has initiated amiodarone gtt  Essential hypertension  Assessment & Plan  · Essential hypertension continue metoprolol and diltiazem used for rate control    PAD (peripheral artery disease) ([facility])  Assessment & Plan  · Peripheral artery disease status post lower extremity stenting  No evidence of claudication at this time  · Continue clopidogrel and atorvastatin  Hypothyroidism  Assessment & Plan  · Hypothyroidism, continue levothyroxine, normal TSH  Stage 3 chronic kidney disease ([facility] [address] )  Assessment & Plan  · CKD 3 patient did see her nephrologist 10/3 in [place]  Stable with IV lasix      Results from last 7 days   Lab Units 10/04/19  0518 10/03/19  1532 09/30/19 2008   BUN mg/dL 27* 28* 33*   CREATININE mg/dL 1 56* 1 61* 1 46*   EGFR ml/min/1 73sq m 33 32 35       Urinary retention  Assessment & Plan  · Chronic urinary retention with catheter  Was recently seen in the emergency department on 9/30/2019 where she was started on ciprofloxacin  Urine culture at that time showed E coli  · Did discontinue further antibiotics as patient has received three days of effective treatment as of admit  Hyperlipidemia  Assessment & Plan  · Hyperlipidemia  Continue atorvastatin  History of cerebral aneurysm repair  Assessment & Plan  · History of cerebral aneurysm status post clipping in 1996  Subsequently has migraine headaches  Patient does follow up with [address] [place] [address] Neurology and is maintained on carbamazepine and depakote  Dementia [facility])  Assessment & Plan  · Dementia without behavioral disturbance  Patient does have underlying depression and mood currently stable on wellbutrin and lorazepam         VTE Pharmacologic Prophylaxis:   Pharmacologic: Warfarin (Coumadin)  Mechanical VTE Prophylaxis in Place: Yes    Patient Centered Rounds: I have performed bedside rounds with nursing staff today  Discussions with Specialists or Other Care Team Provider: cardiology    Education and Discussions with Family / Patient: patient; [facility] for daughterKev to update    Time Spent for Care: 20 minutes  More than 50% of total time spent on counseling and coordination of care as described above  Current Length of Stay: 2 day(s)    Current Patient Status: Inpatient   Certification Statement: The patient will continue to require additional inpatient hospital stay due to IV amiodarone, IV lasix    Discharge Plan: pending improvement in cardiac parameters     Code Status: Level 1 - Full Code      Subjective:   Patient seen this morning, she does not recall our conversation from yesterday regarding her cardiac rhythm issues  She also does not recall being told she was having an acute heart failure exacerbation      Presently she denies any chest pain or shortness of breath, no pillow orthopnea  Denies any palpitations or sensations of racing heartbeat  She denies any lower extremity edema  She has chronic following denies any complication from that  Objective:     Vitals:   Temp (24hrs), Av °F (36 7 °C), Min:97 9 °F (36 6 °C), Max:98 °F (36 7 °C)    Temp:  [97 9 °F (36 6 °C)-98 °F (36 7 °C)] 98 °F (36 7 °C)  HR:  [] 127  Resp:  [18-20] 18  BP: (109-139)/(78-94) 123/94  SpO2:  [90 %-94 %] 94 %  Body mass index is 32 78 kg/m²  Input and Output Summary (last 24 hours): Intake/Output Summary (Last 24 hours) at 10/5/2019 1519  Last data filed at 10/4/2019 2224  Gross per 24 hour   Intake 439 8 ml   Output 375 ml   Net 64 8 ml       Physical Exam:     Physical Exam   Constitutional: She appears well-developed and well-nourished  No distress  Cardiovascular: S1 normal, S2 normal, normal heart sounds and intact distal pulses  An irregularly irregular rhythm present  Tachycardia present  No murmur heard  Pulmonary/Chest: Effort normal and breath sounds normal  No respiratory distress  She has no wheezes  She has no rales  Abdominal: Soft  Bowel sounds are normal  She exhibits no distension  Musculoskeletal: She exhibits edema (mild pitting bilat LEs)  Neurological: She is alert  Psychiatric: Cognition and memory are impaired  Nursing note and vitals reviewed        Additional Data:     Labs:    Results from last 7 days   Lab Units 10/04/19  0517 10/03/19  1532   WBC Thousand/uL 6 70 6 86   HEMOGLOBIN g/dL 11 9 12 4   HEMATOCRIT % 37 0 38 8   PLATELETS Thousands/uL 146* 171   NEUTROS PCT %  --  63   LYMPHS PCT %  --  23   MONOS PCT %  --  11   EOS PCT %  --  2     Results from last 7 days   Lab Units 10/04/19  0518   SODIUM mmol/L 146*   POTASSIUM mmol/L 4 0   CHLORIDE mmol/L 109*   CO2 mmol/L 28   BUN mg/dL 27*   CREATININE mg/dL 1 56*   ANION GAP mmol/L 9   CALCIUM mg/dL 9 0   ALBUMIN g/dL 3 1*   TOTAL BILIRUBIN mg/dL 0 40   ALK PHOS U/L 55   ALT U/L 30   AST U/L 29   GLUCOSE RANDOM mg/dL 127     Results from last 7 days   Lab Units 10/05/19  1340   INR  2 88*                       * I Have Reviewed All Lab Data Listed Above  * Additional Pertinent Lab Tests Reviewed:  All Labs Within Last 24 Hours Reviewed    Imaging:    Imaging Reports Reviewed Today Include:   Imaging Personally Reviewed by Myself Includes:  Tele - afib with -120s this am     Recent Cultures (last 7 days):     Results from last 7 days   Lab Units 09/30/19  1952   URINE CULTURE  >100,000 cfu/ml Escherichia coli*  40,000-49,000 cfu/ml        Last 24 Hours Medication List:     Current Facility-Administered Medications:  acetaminophen 650 mg Oral Q6H PRN Ko Moran, DO    albuterol 2 puff Inhalation Q6H PRN Ko Moran,     amiodarone (CORDARONE) IV bolus 150 mg Intravenous Once Jeffery Dover MD    Followed by        amiodarone 0 5 mg/min Intravenous Continuous Jeffery Dover MD Last Rate: 0 5 mg/min (10/04/19 2121)   atorvastatin 10 mg Oral Daily Paolo Sage, DO    buPROPion 150 mg Oral Daily Paolo Sage, DO    butalbital-acetaminophen-caffeine 1 tablet Oral Q4H PRN Ko Moran, DO    carBAMazepine 100 mg Oral TID Ko Moran,     cholecalciferol 1,000 Units Oral Daily Paolo Sage, DO    clopidogrel 75 mg Oral Daily Paolo Sage, DO    diltiazem 10 mg Intravenous Q4H PRN Ko Moran,     divalproex sodium 250 mg Oral BID Paolo Sage, DO    ferrous sulfate 325 mg Oral BID Paolo Sage, DO    fluticasone-vilanterol 1 puff Inhalation Daily Paolo Sage, DO    furosemide 40 mg Intravenous BID (diuretic) Ko Moran, DO    levothyroxine 88 mcg Oral Daily Paolo Sage, DO    LORazepam 0 5 mg Oral BID Paolo Sage, DO    metoprolol tartrate 25 mg Oral Q12H Tabitha Fofana MD    ondansetron 4 mg Intravenous Q4H PRN Ko Moran,     potassium chloride 20 mEq Oral Daily Paolo Cazares, DO    saccharomyces boulardii 250 mg Oral BID Ko Moran, DO    senna-docusate sodium 1 tablet Oral Daily PRN Michelle Cuevas DO    warfarin 3 mg Oral Daily (warfarin) Michelle Cuevas,          Today, Patient Was Seen By: Ivana Cole PA-C    ** Please Note: Dictation voice to text software may have been used in the creation of this document   **

## 2019-10-05 NOTE — PLAN OF CARE
Problem: Potential for Falls  Goal: Patient will remain free of falls  Description  INTERVENTIONS:  - Assess patient frequently for physical needs  -  Identify cognitive and physical deficits and behaviors that affect risk of falls    -  [place] fall precautions as indicated by assessment   - Educate patient/family on patient safety including physical limitations  - Instruct patient to call for assistance with activity based on assessment  - Modify environment to reduce risk of injury  - Consider OT/PT consult to assist with strengthening/mobility  Outcome: Progressing     Problem: Prexisting or High Potential for Compromised Skin Integrity  Goal: Skin integrity is maintained or improved  Description  INTERVENTIONS:  - Identify patients at risk for skin breakdown  - Assess and monitor skin integrity  - Assess and monitor nutrition and hydration status  - Monitor labs   - Assess for incontinence   - Turn and reposition patient  - Assist with mobility/ambulation  - Relieve pressure over bony prominences  - Avoid friction and shearing  - Provide appropriate hygiene as needed including keeping skin clean and dry  - Evaluate need for skin moisturizer/barrier cream  - Collaborate with interdisciplinary team   - Patient/family teaching  - Consider wound care consult   Outcome: Progressing

## 2019-10-05 NOTE — ASSESSMENT & PLAN NOTE
· Atrial fibrillation/flutter with RVR requiring IV diltiazem in the emergency department  · Previously was on upwards of diltiazem  mg b i d  and titrated down during last cardiology visit to 30 mg t i d    · Increased to 90 mg BID on admit, cardiology has initiated amiodarone gtt

## 2019-10-05 NOTE — ASSESSMENT & PLAN NOTE
· History of cerebral aneurysm status post clipping in 1996  Subsequently has migraine headaches  Patient does follow up with [facility]rology and is maintained on carbamazepine and depakote

## 2019-10-05 NOTE — PROGRESS NOTES
Cardiology Progress Note    Assessment/Plan   Atrial flutter with rapid ventricular response  Possible systolic cardiomyopathy on echocardiogram (unclear based on rate)  HFpEF  PAD s/p complex LE revascularization and covered stent placement after stent fracture  Hx of CAD s/p prior PCI  Dementia  CKD3      Patient is still in a modest degree of heart failure  Increase furosemide 60 mg IV BID  Allow amiodarone and metoprolol to control HR, which will likely respond to decreased filling pressures  Continue warfarin for anti-coagulation  Daily weights, I/O  Monitor renal function  Keep K > 4, Mg > 2, Ca > 8 and PO4 > 2 5  Will follow  LOS: 2 days     Subjective   HR continues to be elevated  Objective     Vital signs in last 24 hours:  Temp:  [97 9 °F (36 6 °C)-98 °F (36 7 °C)] 98 °F (36 7 °C)  HR:  [] 127  Resp:  [18-20] 18  BP: (100-139)/(60-94) 123/94      Intake/Output Summary (Last 24 hours) at 10/5/2019 1023  Last data filed at 10/4/2019 2224  Gross per 24 hour   Intake 439 8 ml   Output 375 ml   Net 64 8 ml     Weight (last 2 days)     Date/Time   Weight    10/04/19 0600   81 3 (179 23)    10/03/19 2133   81 3 (179 23)                Physical Exam:  General: AAOx3, NAD  HEENT: Normocephalic/Atraumatic, No thyromegaly, lymphadenopathy, JVD or carotid bruits  Cardiovasc: Regularly irregular rhythm with a normal rate  No murmurs, rubs or gallops  Radial, carotid pulses are 2+/4  Chest/Pulm: Bibasilar rales  Abdomen: +BSx4, Soft, non-tender  No organomegaly, rebound, rigidity or guarding  Extremities: No edema              Scheduled Meds:  Current Facility-Administered Medications:  acetaminophen 650 mg Oral Q6H PRN Sarah Snowden,     albuterol 2 puff Inhalation Q6H PRN Sarah Snowden, DO    amiodarone (CORDARONE) IV bolus 150 mg Intravenous Once Lina Valdes MD    Followed by        amiodarone 0 5 mg/min Intravenous Continuous Lina Valdes MD Last Rate: 0 5 mg/min (10/04/19 2121)   atorvastatin 10 mg Oral Daily [place], DO    buPROPion 150 mg Oral Daily [place], DO    butalbital-acetaminophen-caffeine 1 tablet Oral Q4H PRN [place], DO    carBAMazepine 100 mg Oral TID [place], DO    cholecalciferol 1,000 Units Oral Daily [facility], DO    clopidogrel 75 mg Oral Daily [facility], DO    diltiazem 10 mg Intravenous Q4H PRN [place], DO    divalproex sodium 250 mg Oral BID [facility], DO    ferrous sulfate 325 mg Oral BID [facility], DO    fluticasone-vilanterol 1 puff Inhalation Daily [facility], DO    furosemide 40 mg Intravenous BID (diuretic) [place], DO    levothyroxine 88 mcg Oral Daily [facility], DO    LORazepam 0 5 mg Oral BID [facility], DO    metoprolol tartrate 25 mg Oral Q12H Marco Tapia MD    ondansetron 4 mg Intravenous Q4H PRN [place], DO    potassium chloride 20 mEq Oral Daily [facility], DO    saccharomyces boulardii 250 mg Oral BID [facility], DO    senna-docusate sodium 1 tablet Oral Daily PRN [place], DO    warfarin 3 mg Oral Daily (warfarin) Prudencioflaco Wall,       Continuous Infusions:  amiodarone 0 5 mg/min Last Rate: 0 5 mg/min (10/04/19 2121)     PRN Meds:   acetaminophen    albuterol    butalbital-acetaminophen-caffeine    diltiazem    ondansetron    senna-docusate sodium      Cardiographics  TTE, 10/4/2019  IMPRESSIONS:  The patient has an arrhythmia with rapid ventricular response; this may result in an apparent reduction in LV function due to inadequate preload, or reversible tachycardia-mediated reduction in LV function  Consider repeat examination once  the arrhythmia is controlled      SUMMARY     LEFT VENTRICLE:  Systolic function was mildly reduced  Ejection fraction was estimated to be 45 %  There was mild diffuse hypokinesis      AORTIC VALVE:  There was mild regurgitation      IVC, HEPATIC VEINS:  The inferior vena cava was dilated        Imaging  CXR, 10/3/2019  IMPRESSION:  Cardiomegaly  Minimal prominence of the pulmonary vessels          Lab Review   Results for Shar Galvin (MRN [id number]) as of 10/5/2019 10:15   10/4/2019 05:18   Sodium 146 (H)   Potassium 4 0   Chloride 109 (H)   CO2 28   Anion Gap 9   BUN 27 (H)   Creatinine 1 56 (H)   Glucose, Random 127   Calcium 9 0   AST 29   ALT 30   Alkaline Phosphatase 55   Total Protein 6 8   Albumin 3 1 (L)   TOTAL BILIRUBIN 0 40   eGFR 33     Results for Shar Galvin (MRN [id number]) as of 10/5/2019 10:15   10/4/2019 05:17   WBC 6 70   Red Blood Cell Count 3 51 (L)   Hemoglobin 11 9   HCT 37 0    (H)   MCH 33 9   MCHC 32 2   RDW 13 2   Platelet Count 242 (L)

## 2019-10-05 NOTE — ASSESSMENT & PLAN NOTE
Wt Readings from Last 3 Encounters:   10/04/19 81 3 kg (179 lb 3 7 oz)   09/30/19 88 3 kg (194 lb 10 7 oz)   07/18/19 83 5 kg (184 lb)     · Acute on chronic diastolic congestive heart failure on admit  Patient has had approximately 10 lb weight gain by report  · Last echo 12/17  Repeat pending  · CXR showing cardiomegaly  Pro-BNP elevated 12k  Troponin <0 02  · Continue IV furosemide  · Cardiology consult appreciated  · Daily weights, strict I/Os, low sodium diet  Consider fluid restriction  Reinforced monitoring parameters with staff

## 2019-10-06 LAB
ANION GAP SERPL CALCULATED.3IONS-SCNC: 9 MMOL/L (ref 4–13)
BASOPHILS # BLD AUTO: 0.03 THOUSANDS/ΜL (ref 0–0.1)
BASOPHILS NFR BLD AUTO: 1 % (ref 0–1)
BUN SERPL-MCNC: 26 MG/DL (ref 5–25)
CALCIUM SERPL-MCNC: 9.5 MG/DL (ref 8.3–10.1)
CHLORIDE SERPL-SCNC: 104 MMOL/L (ref 100–108)
CO2 SERPL-SCNC: 28 MMOL/L (ref 21–32)
CREAT SERPL-MCNC: 1.63 MG/DL (ref 0.6–1.3)
EOSINOPHIL # BLD AUTO: 0.29 THOUSAND/ΜL (ref 0–0.61)
EOSINOPHIL NFR BLD AUTO: 5 % (ref 0–6)
ERYTHROCYTE [DISTWIDTH] IN BLOOD BY AUTOMATED COUNT: 12.9 % (ref 11.6–15.1)
GFR SERPL CREATININE-BSD FRML MDRD: 31 ML/MIN/1.73SQ M
GLUCOSE SERPL-MCNC: 102 MG/DL (ref 65–140)
GLUCOSE SERPL-MCNC: 149 MG/DL (ref 65–140)
HCT VFR BLD AUTO: 38.6 % (ref 34.8–46.1)
HGB BLD-MCNC: 12.6 G/DL (ref 11.5–15.4)
IMM GRANULOCYTES # BLD AUTO: 0.02 THOUSAND/UL (ref 0–0.2)
IMM GRANULOCYTES NFR BLD AUTO: 0 % (ref 0–2)
INR PPP: 3.09 (ref 0.84–1.19)
LYMPHOCYTES # BLD AUTO: 1.66 THOUSANDS/ΜL (ref 0.6–4.47)
LYMPHOCYTES NFR BLD AUTO: 28 % (ref 14–44)
MAGNESIUM SERPL-MCNC: 2.1 MG/DL (ref 1.6–2.6)
MCH RBC QN AUTO: 34.1 PG (ref 26.8–34.3)
MCHC RBC AUTO-ENTMCNC: 32.6 G/DL (ref 31.4–37.4)
MCV RBC AUTO: 104 FL (ref 82–98)
MONOCYTES # BLD AUTO: 0.58 THOUSAND/ΜL (ref 0.17–1.22)
MONOCYTES NFR BLD AUTO: 10 % (ref 4–12)
NEUTROPHILS # BLD AUTO: 3.27 THOUSANDS/ΜL (ref 1.85–7.62)
NEUTS SEG NFR BLD AUTO: 56 % (ref 43–75)
NRBC BLD AUTO-RTO: 0 /100 WBCS
PLATELET # BLD AUTO: 185 THOUSANDS/UL (ref 149–390)
PMV BLD AUTO: 12.4 FL (ref 8.9–12.7)
POTASSIUM SERPL-SCNC: 3.9 MMOL/L (ref 3.5–5.3)
PROTHROMBIN TIME: 32.3 SECONDS (ref 11.6–14.5)
RBC # BLD AUTO: 3.7 MILLION/UL (ref 3.81–5.12)
SODIUM SERPL-SCNC: 141 MMOL/L (ref 136–145)
WBC # BLD AUTO: 5.85 THOUSAND/UL (ref 4.31–10.16)

## 2019-10-06 PROCEDURE — 82948 REAGENT STRIP/BLOOD GLUCOSE: CPT

## 2019-10-06 PROCEDURE — 97163 PT EVAL HIGH COMPLEX 45 MIN: CPT

## 2019-10-06 PROCEDURE — G8979 MOBILITY GOAL STATUS: HCPCS

## 2019-10-06 PROCEDURE — 80048 BASIC METABOLIC PNL TOTAL CA: CPT | Performed by: PHYSICIAN ASSISTANT

## 2019-10-06 PROCEDURE — 99232 SBSQ HOSP IP/OBS MODERATE 35: CPT | Performed by: PHYSICIAN ASSISTANT

## 2019-10-06 PROCEDURE — 85025 COMPLETE CBC W/AUTO DIFF WBC: CPT | Performed by: PHYSICIAN ASSISTANT

## 2019-10-06 PROCEDURE — 83735 ASSAY OF MAGNESIUM: CPT | Performed by: PHYSICIAN ASSISTANT

## 2019-10-06 PROCEDURE — 85610 PROTHROMBIN TIME: CPT | Performed by: PHYSICIAN ASSISTANT

## 2019-10-06 PROCEDURE — G8978 MOBILITY CURRENT STATUS: HCPCS

## 2019-10-06 PROCEDURE — 99232 SBSQ HOSP IP/OBS MODERATE 35: CPT | Performed by: INTERNAL MEDICINE

## 2019-10-06 RX ORDER — POTASSIUM CHLORIDE 750 MG/1
10 TABLET, EXTENDED RELEASE ORAL ONCE
Status: COMPLETED | OUTPATIENT
Start: 2019-10-06 | End: 2019-10-06

## 2019-10-06 RX ORDER — FUROSEMIDE 10 MG/ML
80 INJECTION INTRAMUSCULAR; INTRAVENOUS
Status: DISCONTINUED | OUTPATIENT
Start: 2019-10-07 | End: 2019-10-08

## 2019-10-06 RX ORDER — WARFARIN SODIUM 2 MG/1
1 TABLET ORAL
Status: DISCONTINUED | OUTPATIENT
Start: 2019-10-06 | End: 2019-10-08

## 2019-10-06 RX ADMIN — CARBAMAZEPINE 100 MG: 100 TABLET, CHEWABLE ORAL at 08:03

## 2019-10-06 RX ADMIN — AMIODARONE HYDROCHLORIDE 0.5 MG/MIN: 50 INJECTION, SOLUTION INTRAVENOUS at 23:43

## 2019-10-06 RX ADMIN — METOPROLOL TARTRATE 25 MG: 25 TABLET, FILM COATED ORAL at 08:04

## 2019-10-06 RX ADMIN — POTASSIUM CHLORIDE 20 MEQ: 1500 TABLET, EXTENDED RELEASE ORAL at 08:04

## 2019-10-06 RX ADMIN — ATORVASTATIN CALCIUM 10 MG: 10 TABLET, FILM COATED ORAL at 08:04

## 2019-10-06 RX ADMIN — FLUTICASONE FUROATE AND VILANTEROL TRIFENATATE 1 PUFF: 100; 25 POWDER RESPIRATORY (INHALATION) at 08:04

## 2019-10-06 RX ADMIN — LORAZEPAM 0.5 MG: 0.5 TABLET ORAL at 08:03

## 2019-10-06 RX ADMIN — FUROSEMIDE 60 MG: 10 INJECTION, SOLUTION INTRAMUSCULAR; INTRAVENOUS at 07:57

## 2019-10-06 RX ADMIN — ACETAMINOPHEN 650 MG: 325 TABLET, FILM COATED ORAL at 07:56

## 2019-10-06 RX ADMIN — LORAZEPAM 0.5 MG: 0.5 TABLET ORAL at 17:21

## 2019-10-06 RX ADMIN — LEVOTHYROXINE SODIUM 88 MCG: 88 TABLET ORAL at 06:26

## 2019-10-06 RX ADMIN — WARFARIN SODIUM 1 MG: 2 TABLET ORAL at 17:23

## 2019-10-06 RX ADMIN — POTASSIUM CHLORIDE 10 MEQ: 10 TABLET, EXTENDED RELEASE ORAL at 15:38

## 2019-10-06 RX ADMIN — FUROSEMIDE 60 MG: 10 INJECTION, SOLUTION INTRAMUSCULAR; INTRAVENOUS at 15:39

## 2019-10-06 RX ADMIN — VITAMIN D, TAB 1000IU (100/BT) 1000 UNITS: 25 TAB at 08:03

## 2019-10-06 RX ADMIN — BUPROPION HYDROCHLORIDE 150 MG: 150 TABLET, FILM COATED, EXTENDED RELEASE ORAL at 08:03

## 2019-10-06 RX ADMIN — CARBAMAZEPINE 100 MG: 100 TABLET, CHEWABLE ORAL at 15:39

## 2019-10-06 RX ADMIN — CLOPIDOGREL BISULFATE 75 MG: 75 TABLET ORAL at 08:04

## 2019-10-06 RX ADMIN — Medication 250 MG: at 17:21

## 2019-10-06 RX ADMIN — FERROUS SULFATE TAB 325 MG (65 MG ELEMENTAL FE) 325 MG: 325 (65 FE) TAB at 17:21

## 2019-10-06 RX ADMIN — CARBAMAZEPINE 100 MG: 100 TABLET, CHEWABLE ORAL at 21:54

## 2019-10-06 RX ADMIN — DIVALPROEX SODIUM 250 MG: 250 TABLET, FILM COATED, EXTENDED RELEASE ORAL at 17:21

## 2019-10-06 RX ADMIN — FERROUS SULFATE TAB 325 MG (65 MG ELEMENTAL FE) 325 MG: 325 (65 FE) TAB at 08:04

## 2019-10-06 RX ADMIN — DIVALPROEX SODIUM 250 MG: 250 TABLET, FILM COATED, EXTENDED RELEASE ORAL at 08:03

## 2019-10-06 RX ADMIN — Medication 250 MG: at 08:03

## 2019-10-06 NOTE — PLAN OF CARE
Problem: Potential for Falls  Goal: Patient will remain free of falls  Description  INTERVENTIONS:  - Assess patient frequently for physical needs  -  Identify cognitive and physical deficits and behaviors that affect risk of falls  -  [place] fall precautions as indicated by assessment   - Educate patient/family on patient safety including physical limitations  - Instruct patient to call for assistance with activity based on assessment  - Modify environment to reduce risk of injury  - Consider OT/PT consult to assist with strengthening/mobility  Outcome: Progressing     Problem: Prexisting or High Potential for Compromised Skin Integrity  Goal: Skin integrity is maintained or improved  Description  INTERVENTIONS:  - Identify patients at risk for skin breakdown  - Assess and monitor skin integrity  - Assess and monitor nutrition and hydration status  - Monitor labs   - Assess for incontinence   - Turn and reposition patient  - Assist with mobility/ambulation  - Relieve pressure over bony prominences  - Avoid friction and shearing  - Provide appropriate hygiene as needed including keeping skin clean and dry  - Evaluate need for skin moisturizer/barrier cream  - Collaborate with interdisciplinary team   - Patient/family teaching  - Consider wound care consult   Outcome: Progressing     Problem: SAFETY ADULT  Goal: Patient will remain free of falls  Description  INTERVENTIONS:  - Assess patient frequently for physical needs  -  Identify cognitive and physical deficits and behaviors that affect risk of falls    -  [place] fall precautions as indicated by assessment   - Educate patient/family on patient safety including physical limitations  - Instruct patient to call for assistance with activity based on assessment  - Modify environment to reduce risk of injury  - Consider OT/PT consult to assist with strengthening/mobility  Outcome: Progressing  Goal: Maintain or return to baseline ADL function  Description  INTERVENTIONS:  -  Assess patient's ability to carry out ADLs; assess patient's baseline for ADL function and identify physical deficits which impact ability to perform ADLs (bathing, care of mouth/teeth, toileting, grooming, dressing, etc )  - Assess/evaluate cause of self-care deficits   - Assess range of motion  - Assess patient's mobility; develop plan if impaired  - Assess patient's need for assistive devices and provide as appropriate  - Encourage maximum independence but intervene and supervise when necessary  - Involve family in performance of ADLs  - Assess for home care needs following discharge   - Consider OT consult to assist with ADL evaluation and planning for discharge  - Provide patient education as appropriate  Outcome: Progressing  Goal: Maintain or return mobility status to optimal level  Description  INTERVENTIONS:  - Assess patient's baseline mobility status (ambulation, transfers, stairs, etc )    - Identify cognitive and physical deficits and behaviors that affect mobility  - Identify mobility aids required to assist with transfers and/or ambulation (gait belt, sit-to-stand, lift, walker, cane, etc )  - [place] fall precautions as indicated by assessment  - Record patient progress and toleration of activity level on Mobility SBAR; progress patient to next Phase/Stage  - Instruct patient to call for assistance with activity based on assessment  - Consider rehabilitation consult to assist with strengthening/weightbearing, etc   Outcome: Progressing     Problem: DISCHARGE PLANNING  Goal: Discharge to home or other facility with appropriate resources  Description  INTERVENTIONS:  - Identify barriers to discharge w/patient and caregiver  - Arrange for needed discharge resources and transportation as appropriate  - Identify discharge learning needs (meds, wound care, etc )  - Arrange for interpretive services to assist at discharge as needed  - Refer to Case Management Department for coordinating discharge planning if the patient needs post-hospital services based on physician/advanced practitioner order or complex needs related to functional status, cognitive ability, or social support system  Outcome: Progressing     Problem: Knowledge Deficit  Goal: Patient/family/caregiver demonstrates understanding of disease process, treatment plan, medications, and discharge instructions  Description  Complete learning assessment and assess knowledge base    Interventions:  - Provide teaching at level of understanding  - Provide teaching via preferred learning methods  Outcome: Progressing     Problem: CARDIOVASCULAR - ADULT  Goal: Maintains optimal cardiac output and hemodynamic stability  Description  INTERVENTIONS:  - Monitor I/O, vital signs and rhythm  - Monitor for S/S and trends of decreased cardiac output  - Administer and titrate ordered vasoactive medications to optimize hemodynamic stability  - Assess quality of pulses, skin color and temperature  - Assess for signs of decreased coronary artery perfusion  - Instruct patient to report change in severity of symptoms  Outcome: Progressing  Goal: Absence of cardiac dysrhythmias or at baseline rhythm  Description  INTERVENTIONS:  - Continuous cardiac monitoring, vital signs, obtain 12 lead EKG if ordered  - Administer antiarrhythmic and heart rate control medications as ordered  - Monitor electrolytes and administer replacement therapy as ordered  Outcome: Progressing     Problem: GENITOURINARY - ADULT  Goal: Maintains or returns to baseline urinary function  Description  INTERVENTIONS:  - Assess urinary function  - Encourage oral fluids to ensure adequate hydration if ordered  - Administer IV fluids as ordered to ensure adequate hydration  - Administer ordered medications as needed  - Offer frequent toileting  - Follow urinary retention protocol if ordered  Outcome: Progressing  Goal: Absence of urinary retention  Description  INTERVENTIONS:  - Assess patients ability to void and empty bladder  - Monitor I/O  - Bladder scan as needed  - Discuss with physician/AP medications to alleviate retention as needed  - Discuss catheterization for long term situations as appropriate  Outcome: Progressing  Goal: Urinary catheter remains patent  Description  INTERVENTIONS:  - Assess patency of urinary catheter  - If patient has a chronic baca, consider changing catheter if non-functioning  - Follow guidelines for intermittent irrigation of non-functioning urinary catheter  Outcome: Progressing     Problem: MUSCULOSKELETAL - ADULT  Goal: Maintain or return mobility to safest level of function  Description  INTERVENTIONS:  - Assess patient's ability to carry out ADLs; assess patient's baseline for ADL function and identify physical deficits which impact ability to perform ADLs (bathing, care of mouth/teeth, toileting, grooming, dressing, etc )  - Assess/evaluate cause of self-care deficits   - Assess range of motion  - Assess patient's mobility  - Assess patient's need for assistive devices and provide as appropriate  - Encourage maximum independence but intervene and supervise when necessary  - Involve family in performance of ADLs  - Assess for home care needs following discharge   - Consider OT consult to assist with ADL evaluation and planning for discharge  - Provide patient education as appropriate  Outcome: Progressing

## 2019-10-06 NOTE — ASSESSMENT & PLAN NOTE
· CKD 3: patient did see her Nephrologist 10/3 in [place]  Monitor Cr closely on IV diuretics      Results from last 7 days   Lab Units 10/06/19  0856 10/04/19  0518 10/03/19  1532 09/30/19 2008   BUN mg/dL 26* 27* 28* 33*   CREATININE mg/dL 1 63* 1 56* 1 61* 1 46*   EGFR ml/min/1 73sq m 31 33 32 35

## 2019-10-06 NOTE — PROGRESS NOTES
Cardiology Progress Note    Assessment/Plan   Atrial flutter with rapid ventricular response  Possible systolic cardiomyopathy on echocardiogram (unclear based on rate)  HFpEF  PAD s/p complex LE revascularization and covered stent placement after stent fracture  Hx of CAD s/p prior PCI  Dementia  CKD3      Patient is still in a modest degree of heart failure  Increase furosemide 80 mg IV BID  Allow amiodarone and metoprolol to control HR, which will likely respond to decreased filling pressures  Continue warfarin for anti-coagulation  Daily weights, I/O  Labs ordered for this morning  Monitor renal function  Keep K > 4, Mg > 2, Ca > 8 and PO4 > 2 5      Will follow  LOS: 3 days     Subjective   HR remains elevated  Amiodarone gtt continues  I/O -[id number]  Objective     Vital signs in last 24 hours:  Temp:  [97 5 °F (36 4 °C)-97 9 °F (36 6 °C)] 97 5 °F (36 4 °C)  HR:  [108-118] 110  Resp:  [17-19] 19  BP: (107-155)/() 107/72      Intake/Output Summary (Last 24 hours) at 10/6/2019 0850  Last data filed at 10/6/2019 2463  Gross per 24 hour   Intake 793 05 ml   Output 2375 ml   Net -1581 95 ml     Weight (last 2 days)     Date/Time   Weight    10/06/19 0600   81 4 (179 45)    10/05/19 1524   83 9 (185)    10/04/19 0600   81 3 (179 23)                Physical Exam:  General: AAOx3, NAD  HEENT: Normocephalic/Atraumatic, No thyromegaly, lymphadenopathy or carotid bruits  There is JVD  Cardiovasc: Regular rhythm with a normal rate  No murmurs, rubs or gallops  Radial, carotid, femoral pulses are 2+/4  Chest/Pulm: Bibasilar rales  Abdomen: +BSx4, Soft, non-tender  No organomegaly, rebound, rigidity or guarding  Extremities: No edema            Scheduled Meds:  Current Facility-Administered Medications:  acetaminophen 650 mg Oral Q6H PRN Felicie [place], DO    albuterol 2 puff Inhalation Q6H PRN Felicie [place], DO    amiodarone (CORDARONE) IV bolus 150 mg Intravenous Once Aisha Chavez MD Followed by        amiodarone 0 5 mg/min Intravenous Continuous Allyson Guevara MD Last Rate: 0 5 mg/min (10/05/19 1632)   atorvastatin 10 mg Oral Daily Paolo Sage, DO    buPROPion 150 mg Oral Daily Landen Jose Martin, DO    butalbital-acetaminophen-caffeine 1 tablet Oral Q4H PRN Landen Philippe, DO    carBAMazepine 100 mg Oral TID Landen Philippe, DO    cholecalciferol 1,000 Units Oral Daily Paolo Sage, DO    clopidogrel 75 mg Oral Daily Paolo Cazares, DO    diltiazem 10 mg Intravenous Q4H PRN Landen Philippe, DO    divalproex sodium 250 mg Oral BID Paolo Cazares, DO    ferrous sulfate 325 mg Oral BID Landen Philippe, DO    fluticasone-vilanterol 1 puff Inhalation Daily Paolo Luong, DO    furosemide 60 mg Intravenous BID (diuretic) Pennie Band, DO    levothyroxine 88 mcg Oral Daily Paolo Luong, DO    LORazepam 0 5 mg Oral BID Paolo Cazares, DO    metoprolol tartrate 25 mg Oral Q12H Dorita Olivo MD    ondansetron 4 mg Intravenous Q4H PRN Landen Philippe, DO    potassium chloride 20 mEq Oral Daily Paolo Cazares, DO    saccharomyces boulardii 250 mg Oral BID Landen Philippe, DO    senna-docusate sodium 1 tablet Oral Daily PRN Landen Philippe, DO    warfarin 1 mg Oral Daily (warfarin) Savita Suggs PA-C      Continuous Infusions:  amiodarone 0 5 mg/min Last Rate: 0 5 mg/min (10/05/19 1632)     PRN Meds:   acetaminophen    albuterol    butalbital-acetaminophen-caffeine    diltiazem    ondansetron    senna-docusate sodium        Lab Review   Results for Sosa Ree (MRN [id number]) as of 10/6/2019 08:48   10/6/2019 06:38   Protime 32 3 (H)   INR 3 09 (H)         Addendum:  Results for Sosa Ree (MRN [id number]) as of 10/6/2019 16:17   10/6/2019 08:56   Sodium 141   Potassium 3 9   Chloride 104   CO2 28   Anion Gap 9   BUN 26 (H)   Creatinine 1 63 (H)   Glucose, Random 149 (H)   Calcium 9 5   eGFR 31   Magnesium 2 1     Results for Sosa Dawna (MRN [id number]) as of 10/6/2019 16:17 10/6/2019 09:11   WBC 5 85   Red Blood Cell Count 3 70 (L)   Hemoglobin 12 6   HCT 38 6    (H)   MCH 34 1   MCHC 32 6   RDW 12 9   Platelet Count 635

## 2019-10-06 NOTE — ASSESSMENT & PLAN NOTE
· Atrial fibrillation/flutter with RVR requiring IV Diltiazem in the ED  · Previously was on upwards of Diltiazem  mg BID  and titrated down during last Cardiology visit to 30 mg TID  · Cardiology input appreciated  On Amiodarone gtt and Metoprolol  · Continue Telemetry  · Continue Coumadin - note: INR slightly supratherapeutic at 3 09 today - discussed with pharmacy - will decrease Couamadin to 1mg today from 3mg for today

## 2019-10-06 NOTE — PLAN OF CARE
Problem: PHYSICAL THERAPY ADULT  Goal: Performs mobility at highest level of function for planned discharge setting  See evaluation for individualized goals  Description  Treatment/Interventions: Functional transfer training, LE strengthening/ROM, Therapeutic exercise, Endurance training, Elevations, Cognitive reorientation, Patient/family training, Equipment eval/education, Bed mobility, Gait training, Spoke to nursing  Equipment Recommended: Kayley Raygoza       See flowsheet documentation for full assessment, interventions and recommendations  Outcome: Progressing  Note:   Prognosis: Good  Problem List: Decreased strength, Decreased range of motion, Decreased endurance, Impaired balance, Decreased coordination, Decreased mobility, Decreased cognition, Decreased safety awareness, Impaired hearing  Assessment: Pt  is a 70 yo F who presents with SOB and chest heaviness, Dx: Acute on Chronic diastolic CHF  order placed for PT eval and tx, w/ activity order of up w/ A and ambulate patient  pt presents w/ comorbidities of A flutter, Dementia, HLD, CKD, htn, PAD, hypothyroidism, COPD, Htn, LE edema and personal factors of living in 2 story house, mobilizing w/ assistive device, inability to navigate level surfaces w/o external assistance, unable to perform dynamic tasks in community, decreased cognition, positive fall history, impulsivity, unable to perform physical activity, inability to perform IADLs and inability to perform ADLs  pt presents w/ weakness, decreased ROM, decreased endurance, impaired cognition, impaired balance, gait deviations, visual impairment, impaired coordination, decreased safety awareness, impaired judgment, fall risk and LE edema   these impairments are evident in findings from physical examination (weakness, decreased ROM and edema of extremities), mobility assessment (need for min assist w/ all phases of mobility when usually mobilizing independently, tolerance to only 250 feet of ambulation and need for cueing for mobility technique), and Barthel Index: 70/100  pt needed input for task focus and mobility technique  pt is at risk for falls due to physical and safety awareness deficits  pt's clinical presentation is unstable/unpredictable (evident in need for assist w/ all phases of mobility when usually mobilizing independently, tolerance to only 250 feet of ambulation, need for input for mobility technique, need for input for task focus and mobility technique and need for input for mobility technique/safety)  pt needs inpatient PT tx to improve mobility deficits  discharge recommendation is for home PT upon return to [facility] to reduce fall risk and maximize level of functional independence  Recommendation: Home PT(return to retirement with HHPT)     PT - OK to Discharge: Yes    See flowsheet documentation for full assessment

## 2019-10-06 NOTE — ASSESSMENT & PLAN NOTE
Wt Readings from Last 3 Encounters:   10/06/19 81 4 kg (179 lb 7 3 oz)   09/30/19 88 3 kg (194 lb 10 7 oz)   07/18/19 83 5 kg (184 lb)     · Acute on chronic diastolic congestive heart failure on admission  Patient has had approximately 10 lb weight gain by report  · Echo shows an EF of 45%, study was technically difficult to evaluate for diastolic dysfunction  · CXR showing cardiomegaly  Pro-BNP elevated 12k  Troponin <0 02  · On Furosemide IV  · Cardiology input appreciated  · Daily weights, strict I/Os, low sodium diet  · Monitor electrolytes: keep K+ >4 and Mg >2

## 2019-10-06 NOTE — ASSESSMENT & PLAN NOTE
· History of cerebral aneurysm status post clipping in 1996  Subsequently has migraine headaches  Patient does follow up with [facility] and is maintained on Carbamazepine and Depakote

## 2019-10-06 NOTE — PROGRESS NOTES
Progress Note Sen Fast 1945, 69 y o  female MRN: [id number]    Unit/Bed#: -01 Encounter: [id number]    Primary Care Provider: Magui Arrington DO   Date and time admitted to hospital: 10/3/2019  3:21 PM        * Acute on chronic diastolic congestive heart failure [facility])  Assessment & Plan  Wt Readings from Last 3 Encounters:   10/06/19 81 4 kg (179 lb 7 3 oz)   09/30/19 88 3 kg (194 lb 10 7 oz)   07/18/19 83 5 kg (184 lb)     · Acute on chronic diastolic congestive heart failure on admission  Patient has had approximately 10 lb weight gain by report  · Echo shows an EF of 45%, study was technically difficult to evaluate for diastolic dysfunction  · CXR showing cardiomegaly  Pro-BNP elevated 12k  Troponin <0 02  · On Furosemide IV  · Cardiology input appreciated  · Daily weights, strict I/Os, low sodium diet  · Monitor electrolytes: keep K+ >4 and Mg >2  Atrial flutter (HCC)  Assessment & Plan  · Atrial fibrillation/flutter with RVR requiring IV Diltiazem in the ED  · Previously was on upwards of Diltiazem  mg BID  and titrated down during last Cardiology visit to 30 mg TID  · Cardiology input appreciated  On Amiodarone gtt and Metoprolol  · Continue Telemetry  · Continue Coumadin - note: INR slightly supratherapeutic at 3 09 today - discussed with pharmacy - will decrease Couamadin to 1mg today from 3mg for today  Urinary retention  Assessment & Plan  · Chronic urinary retention with catheter  Was recently seen in the emergency department on 9/30/2019 where she was started on Ciprofloxacin  Urine culture at that time showed E coli  · Did discontinue further antibiotics as patient has received three days of effective treatment as of admit  History of cerebral aneurysm repair  Assessment & Plan  · History of cerebral aneurysm status post clipping in 1996  Subsequently has migraine headaches    Patient does follow up with [address] [place] [address] Neurology and is maintained on Carbamazepine and Depakote  PAD (peripheral artery disease) ([facility])  Assessment & Plan  · Peripheral artery disease status post lower extremity stenting  No evidence of claudication at this time  · Continue Clopidogrel and Atorvastatin  Hypothyroidism  Assessment & Plan  · Hypothyroidism, continue Levothyroxine, normal TSH  Stage 3 chronic kidney disease ([facility] [address] [address] )  Assessment & Plan  · CKD 3: patient did see her Nephrologist 10/3 in [facility]  Monitor Cr closely on IV diuretics  Results from last 7 days   Lab Units 10/06/19  0856 10/04/19  0518 10/03/19  1532 09/30/19 2008   BUN mg/dL 26* 27* 28* 33*   CREATININE mg/dL 1 63* 1 56* 1 61* 1 46*   EGFR ml/min/1 73sq m 31 33 32 35       Hyperlipidemia  Assessment & Plan  · Hyperlipidemia  Continue Atorvastatin  Dementia [facility])  Assessment & Plan  · Dementia without behavioral disturbance  Patient does have underlying depression and mood currently stable on Wellbutrin and Lorazepam     VTE Pharmacologic Prophylaxis:   Pharmacologic: Warfarin (Coumadin)  Mechanical VTE Prophylaxis in Place: Yes    Patient Centered Rounds: I have performed bedside rounds with nursing staff today  Discussions with Specialists or Other Care Team Provider: [address] [email or web address] Cardiology input  Education and Discussions with Family / Patient: Mere Prakash to call Daughter Judson 89 cell phone but no answer, will try again later  Time Spent for Care: 30 minutes  More than 50% of total time spent on counseling and coordination of care as described above  Current Length of Stay: 3 day(s)    Current Patient Status: Inpatient   Certification Statement: The patient will continue to require additional inpatient hospital stay due to treatment of a-fib with RVR and CHF requiring IV Amiodarone and IV Lasix  Discharge Plan: Not medically cleared  Code Status: Level 1 - Full Code      Subjective:   Patient seen and without complaints  Denies CP or SOB  No abdominal pain    HR in 100s-120s this am   No nausea or vomiting  No fevers  Objective:     Vitals:   Temp (24hrs), Av 7 °F (36 5 °C), Min:97 5 °F (36 4 °C), Max:97 9 °F (36 6 °C)    Temp:  [97 5 °F (36 4 °C)-97 9 °F (36 6 °C)] 97 5 °F (36 4 °C)  HR:  [108-118] 111  Resp:  [17-22] 22  BP: (107-155)/() 122/84  SpO2:  [90 %-97 %] 93 %  Body mass index is 32 82 kg/m²  Input and Output Summary (last 24 hours): Intake/Output Summary (Last 24 hours) at 10/6/2019 1413  Last data filed at 10/6/2019 1316  Gross per 24 hour   Intake 913 05 ml   Output 2675 ml   Net -1761 95 ml       Physical Exam:     Physical Exam   Constitutional: No distress  HENT:   Head: Normocephalic and atraumatic  Eyes: No scleral icterus  Cardiovascular:   +S1S2, irregularly irregular  Pulmonary/Chest: No respiratory distress  She has no wheezes  Decreased BS at bases  Abdominal: Soft  Bowel sounds are normal  She exhibits no distension  There is no tenderness  Musculoskeletal: She exhibits edema  Neurological: She is alert  Skin: Skin is warm and dry  She is not diaphoretic  Vitals reviewed  Additional Data:     Labs:    Results from last 7 days   Lab Units 10/06/19  0911   WBC Thousand/uL 5 85   HEMOGLOBIN g/dL 12 6   HEMATOCRIT % 38 6   PLATELETS Thousands/uL 185   NEUTROS PCT % 56   LYMPHS PCT % 28   MONOS PCT % 10   EOS PCT % 5     Results from last 7 days   Lab Units 10/06/19  0856 10/04/19  0518   POTASSIUM mmol/L 3 9 4 0   CHLORIDE mmol/L 104 109*   CO2 mmol/L 28 28   BUN mg/dL 26* 27*   CREATININE mg/dL 1 63* 1 56*   CALCIUM mg/dL 9 5 9 0   ALK PHOS U/L  --  55   ALT U/L  --  30   AST U/L  --  29     Results from last 7 days   Lab Units 10/06/19  0638   INR  3 09*       * I Have Reviewed All Lab Data Listed Above  * Additional Pertinent Lab Tests Reviewed: All Labs Within Last 24 Hours Reviewed    Imaging:    Imaging Reports Reviewed Today Include: Echo reviewed      Recent Cultures (last 7 days):     Results from last 7 days   Lab Units 09/30/19 1952   URINE CULTURE  >100,000 cfu/ml Escherichia coli*  40,000-49,000 cfu/ml        Last 24 Hours Medication List:     Current Facility-Administered Medications:  acetaminophen 650 mg Oral Q6H PRN Alek Ruiz, DO    albuterol 2 puff Inhalation Q6H PRN Alek Ruiz, DO    amiodarone (CORDARONE) IV bolus 150 mg Intravenous Once Fede Beatty MD    Followed by        amiodarone 0 5 mg/min Intravenous Continuous Fede Beatty MD Last Rate: 0 5 mg/min (10/05/19 1632)   atorvastatin 10 mg Oral Daily Paolo Sage, DO    buPROPion 150 mg Oral Daily Paolo Sage, DO    butalbital-acetaminophen-caffeine 1 tablet Oral Q4H PRN Alek Ruiz, DO    carBAMazepine 100 mg Oral TID Alek Ruiz, DO    cholecalciferol 1,000 Units Oral Daily Paolo Sage, DO    clopidogrel 75 mg Oral Daily Paolo Cazares, DO    diltiazem 10 mg Intravenous Q4H PRN Alek Ruiz, DO    divalproex sodium 250 mg Oral BID Paolo Sage, DO    ferrous sulfate 325 mg Oral BID Alek Ruiz, DO    fluticasone-vilanterol 1 puff Inhalation Daily Paolo Cazares, DO    furosemide 60 mg Intravenous BID (diuretic) Charlotte Andrade, DO    levothyroxine 88 mcg Oral Daily Paolo Sage, DO    LORazepam 0 5 mg Oral BID Paolo Sage, DO    metoprolol tartrate 25 mg Oral Q12H [address] Fede Beatty MD    ondansetron 4 mg Intravenous Q4H PRN Alek Ruiz, DO    potassium chloride 10 mEq Oral Once David Arteaga PA-C    potassium chloride 20 mEq Oral Daily Paolo Sage, DO    saccharomyces boulardii 250 mg Oral BID Paolo Cazares, DO    senna-docusate sodium 1 tablet Oral Daily PRN Alek Ruiz, DO    warfarin 1 mg Oral Daily (warfarin) David Arteaga PA-C         Today, Patient Was Seen By: David Arteaga PA-C    ** Please Note: Dictation voice to text software may have been used in the creation of this document   **

## 2019-10-06 NOTE — PLAN OF CARE
Problem: Potential for Falls  Goal: Patient will remain free of falls  Description  INTERVENTIONS:  - Assess patient frequently for physical needs  -  Identify cognitive and physical deficits and behaviors that affect risk of falls  -  [place] fall precautions as indicated by assessment   - Educate patient/family on patient safety including physical limitations  - Instruct patient to call for assistance with activity based on assessment  - Modify environment to reduce risk of injury  - Consider OT/PT consult to assist with strengthening/mobility  Outcome: Progressing     Problem: Prexisting or High Potential for Compromised Skin Integrity  Goal: Skin integrity is maintained or improved  Description  INTERVENTIONS:  - Identify patients at risk for skin breakdown  - Assess and monitor skin integrity  - Assess and monitor nutrition and hydration status  - Monitor labs   - Assess for incontinence   - Turn and reposition patient  - Assist with mobility/ambulation  - Relieve pressure over bony prominences  - Avoid friction and shearing  - Provide appropriate hygiene as needed including keeping skin clean and dry  - Evaluate need for skin moisturizer/barrier cream  - Collaborate with interdisciplinary team   - Patient/family teaching  - Consider wound care consult   Outcome: Progressing     Problem: SAFETY ADULT  Goal: Patient will remain free of falls  Description  INTERVENTIONS:  - Assess patient frequently for physical needs  -  Identify cognitive and physical deficits and behaviors that affect risk of falls    -  [place] fall precautions as indicated by assessment   - Educate patient/family on patient safety including physical limitations  - Instruct patient to call for assistance with activity based on assessment  - Modify environment to reduce risk of injury  - Consider OT/PT consult to assist with strengthening/mobility  Outcome: Progressing  Goal: Maintain or return to baseline ADL function  Description  INTERVENTIONS:  -  Assess patient's ability to carry out ADLs; assess patient's baseline for ADL function and identify physical deficits which impact ability to perform ADLs (bathing, care of mouth/teeth, toileting, grooming, dressing, etc )  - Assess/evaluate cause of self-care deficits   - Assess range of motion  - Assess patient's mobility; develop plan if impaired  - Assess patient's need for assistive devices and provide as appropriate  - Encourage maximum independence but intervene and supervise when necessary  - Involve family in performance of ADLs  - Assess for home care needs following discharge   - Consider OT consult to assist with ADL evaluation and planning for discharge  - Provide patient education as appropriate  Outcome: Progressing  Goal: Maintain or return mobility status to optimal level  Description  INTERVENTIONS:  - Assess patient's baseline mobility status (ambulation, transfers, stairs, etc )    - Identify cognitive and physical deficits and behaviors that affect mobility  - Identify mobility aids required to assist with transfers and/or ambulation (gait belt, sit-to-stand, lift, walker, cane, etc )  - [place] fall precautions as indicated by assessment  - Record patient progress and toleration of activity level on Mobility SBAR; progress patient to next Phase/Stage  - Instruct patient to call for assistance with activity based on assessment  - Consider rehabilitation consult to assist with strengthening/weightbearing, etc   Outcome: Progressing     Problem: DISCHARGE PLANNING  Goal: Discharge to home or other facility with appropriate resources  Description  INTERVENTIONS:  - Identify barriers to discharge w/patient and caregiver  - Arrange for needed discharge resources and transportation as appropriate  - Identify discharge learning needs (meds, wound care, etc )  - Arrange for interpretive services to assist at discharge as needed  - Refer to Case Management Department for coordinating discharge planning if the patient needs post-hospital services based on physician/advanced practitioner order or complex needs related to functional status, cognitive ability, or social support system  Outcome: Progressing     Problem: Knowledge Deficit  Goal: Patient/family/caregiver demonstrates understanding of disease process, treatment plan, medications, and discharge instructions  Description  Complete learning assessment and assess knowledge base    Interventions:  - Provide teaching at level of understanding  - Provide teaching via preferred learning methods  Outcome: Progressing     Problem: CARDIOVASCULAR - ADULT  Goal: Maintains optimal cardiac output and hemodynamic stability  Description  INTERVENTIONS:  - Monitor I/O, vital signs and rhythm  - Monitor for S/S and trends of decreased cardiac output  - Administer and titrate ordered vasoactive medications to optimize hemodynamic stability  - Assess quality of pulses, skin color and temperature  - Assess for signs of decreased coronary artery perfusion  - Instruct patient to report change in severity of symptoms  Outcome: Progressing  Goal: Absence of cardiac dysrhythmias or at baseline rhythm  Description  INTERVENTIONS:  - Continuous cardiac monitoring, vital signs, obtain 12 lead EKG if ordered  - Administer antiarrhythmic and heart rate control medications as ordered  - Monitor electrolytes and administer replacement therapy as ordered  Outcome: Progressing     Problem: GENITOURINARY - ADULT  Goal: Maintains or returns to baseline urinary function  Description  INTERVENTIONS:  - Assess urinary function  - Encourage oral fluids to ensure adequate hydration if ordered  - Administer IV fluids as ordered to ensure adequate hydration  - Administer ordered medications as needed  - Offer frequent toileting  - Follow urinary retention protocol if ordered  Outcome: Progressing  Goal: Absence of urinary retention  Description  INTERVENTIONS:  - Assess patients ability to void and empty bladder  - Monitor I/O  - Bladder scan as needed  - Discuss with physician/AP medications to alleviate retention as needed  - Discuss catheterization for long term situations as appropriate  Outcome: Progressing  Goal: Urinary catheter remains patent  Description  INTERVENTIONS:  - Assess patency of urinary catheter  - If patient has a chronic baca, consider changing catheter if non-functioning  - Follow guidelines for intermittent irrigation of non-functioning urinary catheter  Outcome: Progressing     Problem: MUSCULOSKELETAL - ADULT  Goal: Maintain or return mobility to safest level of function  Description  INTERVENTIONS:  - Assess patient's ability to carry out ADLs; assess patient's baseline for ADL function and identify physical deficits which impact ability to perform ADLs (bathing, care of mouth/teeth, toileting, grooming, dressing, etc )  - Assess/evaluate cause of self-care deficits   - Assess range of motion  - Assess patient's mobility  - Assess patient's need for assistive devices and provide as appropriate  - Encourage maximum independence but intervene and supervise when necessary  - Involve family in performance of ADLs  - Assess for home care needs following discharge   - Consider OT consult to assist with ADL evaluation and planning for discharge  - Provide patient education as appropriate  Outcome: Progressing

## 2019-10-06 NOTE — PHYSICAL THERAPY NOTE
PHYSICAL THERAPY Evaluation NOTE    Patient Name: Sarah Lee  DZKRN'Q Date: 10/6/2019     AGE:   69 y o  Mrn:   [id number]  ADMIT DX:  Atrial flutter ([facility]) [I48 92]  Shortness of breath [R06 02]  Acute CHF (congestive heart failure) ([place] [address] ) [I50 9]    Past Medical History:   Diagnosis Date    Atrial fibrillation ([place] [address] )     Brain aneurysm     CAD (coronary artery disease)     Cardiac disease     had st[facility] 12 years [facility] in [facility]    CKD (chronic kidney disease) stage 3, GFR 30-59 ml/min ([facility]) ckd    COPD (chronic obstructive pulmonary disease) ([facility])     Dementia ([facility])     Diastolic CHF, chronic ([facility])     GERD (gastroesophageal reflux disease)     Hyperlipidemia     Hyperlipidemia     Hypertension     Hypothyroidism     Migraine     PAD (peripheral artery disease) ([place] [address] )     Renal disorder     Seizures ([place] [address] )     Stroke ([place] [address] )      Length Of Stay: 3  PHYSICAL THERAPY EVALUATION :    10/06/19 1441   Note Type   Note type Eval only   Pain Assessment   Pain Assessment 0-10   Pain Score No Pain   Home Living   Type of Home Assisted living  ([facility])   Home Layout Two level;Bed/bath upstairs  (10 stairs to bedroom per chart)   Bathroom Shower/Tub Walk-in shower   Bathroom Toilet Raised   Bathroom Equipment Grab bars in shower;Built-in shower seat;Grab bars around toilet   [address]   Additional Comments Pt  from [facility]    Prior Function   Level of [place] Needs assistance with IADLs; Needs assistance with ADLs and functional mobility   Lives With Alone   Receives Help From Personal care attendant   ADL Assistance Needs assistance   IADLs Needs assistance   Falls in the last 6 months 1 to 4   Vocational Retired   Comments PtA, Pt  reports assistance with ADLs, IADLs and functional mobility with RW as AD  Restrictions/Precautions   Other Precautions Cognitive; Chair Alarm; Bed Alarm;Multiple lines;Telemetry; Fall Risk;Hard of hearing   General   Additional Pertinent History Pt  is a 68 yo F who presents with SOB and chest heaviness, Dx: Acute on Chronic diastolic CHF  Family/Caregiver Present No   Cognition   Overall Cognitive Status WFL   Arousal/Participation Cooperative   Orientation Level Oriented X4   Memory Decreased recall of recent events;Decreased short term memory   Following Commands Follows one step commands with increased time or repetition   Comments Pt  was identified with full name and birthdate   RLE Assessment   RLE Assessment   (functionally > 3+/5)   LLE Assessment   LLE Assessment   (functionally > 3+/5)   Coordination   Movements are Fluid and Coordinated 0   Coordination and Movement Description mild gait ataxia noted with fatigue   Sensation WFL   Light Touch   RLE Light Touch Grossly intact   LLE Light Touch Grossly intact   Bed Mobility   Supine to Sit Unable to assess   Sit to Supine Unable to assess   Additional Comments Pt  seated OOB in chair prior to and following PT session   Transfers   Sit to Stand 5  Supervision   Additional items Increased time required;Verbal cues  (for hand placement and sequencing)   Stand to Sit 5  Supervision   Additional items Impulsive;Verbal cues  (to reach back to control descent)   Ambulation/Elevation   Gait pattern Narrow JENNYFER; Forward Flexion;Decreased foot clearance;Shuffling; Inconsistent randi; Redundant gait at times; Short stride; Excessively slow   Gait Assistance 5  Supervision   Additional items Verbal cues  (for course navigation, posture, and AD managment)   Assistive Device Rolling walker   Distance 250'x1   Stair Management Assistance 4  Minimal assist   Additional items Assist x 1;Verbal cues; Increased time required  (for sequencing and use of HR)   Stair Management Technique One rail R;Alternating pattern  (and HHA)   Number of Stairs 14   Balance   Static Sitting Good   Dynamic Sitting Fair +   Static Standing Fair   Dynamic Standing Fair -   Ambulatory Fair -   Endurance Deficit   Endurance Deficit Yes   Endurance Deficit Description postural and gait degradation noted with faitgue   Activity Tolerance   Activity Tolerance Patient limited by fatigue   Nurse Made Aware Spoke to RN   Assessment   Prognosis Good   Problem List Decreased strength;Decreased range of motion;Decreased endurance; Impaired balance;Decreased coordination;Decreased mobility; Decreased cognition;Decreased safety awareness; Impaired hearing   Assessment Pt  is a 68 yo F who presents with SOB and chest heaviness, Dx: Acute on Chronic diastolic CHF  order placed for PT eval and tx, w/ activity order of up w/ A and ambulate patient  pt presents w/ comorbidities of A flutter, Dementia, HLD, CKD, htn, PAD, hypothyroidism, COPD, Htn, LE edema and personal factors of living in 2 story house, mobilizing w/ assistive device, inability to navigate level surfaces w/o external assistance, unable to perform dynamic tasks in community, decreased cognition, positive fall history, impulsivity, unable to perform physical activity, inability to perform IADLs and inability to perform ADLs  pt presents w/ weakness, decreased ROM, decreased endurance, impaired cognition, impaired balance, gait deviations, visual impairment, impaired coordination, decreased safety awareness, impaired judgment, fall risk and LE edema  these impairments are evident in findings from physical examination (weakness, decreased ROM and edema of extremities), mobility assessment (need for min assist w/ all phases of mobility when usually mobilizing independently, tolerance to only 250 feet of ambulation and need for cueing for mobility technique), and Barthel Index: 70/100  pt needed input for task focus and mobility technique  pt is at risk for falls due to physical and safety awareness deficits   pt's clinical presentation is unstable/unpredictable (evident in need for assist w/ all phases of mobility when usually mobilizing independently, tolerance to only 250 feet of ambulation, need for input for mobility technique, need for input for task focus and mobility technique and need for input for mobility technique/safety)  pt needs inpatient PT tx to improve mobility deficits  discharge recommendation is for home PT upon return to group home to reduce fall risk and maximize level of functional independence  Goals   Patient Goals to get out of here   STG Expiration Date 10/16/19   Short Term Goal #1 pt will: Increase bilateral LE strength 1/2 grade to facilitate independent mobility, Perform all bed mobility tasks independently to decrease fall risk factors, Perform all transfers modified independent to improve independence, Ambulate 500 ft  with roller walker modified independent w/o LOB, Navigate 14 stairs w/ supervision with unilateral handrail to facilitate return to previous living environment, Increase all balance 1/2 grade to decrease risk for falls and Improve Barthel Index score to 95 or greater to facilitate independence   PT Treatment Day 0   Plan   Treatment/Interventions Functional transfer training;LE strengthening/ROM; Therapeutic exercise; Endurance training;Elevations;Cognitive reorientation;Patient/family training;Equipment eval/education; Bed mobility;Gait training;Spoke to nursing   PT Frequency   (3-5x/week)   Recommendation   Recommendation Home PT  (return to [facility] with HHPT)   Equipment Recommended Walker   PT - OK to Discharge Yes   Additional Comments when medically appropriate   Barthel Index   Feeding 10   Bathing 0   Grooming Score 5   Dressing Score 5   Bladder Score 10   Bowels Score 10   Toilet Use Score 5   Transfers (Bed/Chair) Score 10   Mobility (Level Surface) Score 10   Stairs Score 5   Barthel Index Score 70     Skilled PT recommended while in hospital and upon DC to progress pt toward treatment goals       Cammy Medina, PT, DPT 10/6/2019

## 2019-10-07 LAB
ANION GAP SERPL CALCULATED.3IONS-SCNC: 9 MMOL/L (ref 4–13)
BASOPHILS # BLD AUTO: 0.02 THOUSANDS/ΜL (ref 0–0.1)
BASOPHILS NFR BLD AUTO: 0 % (ref 0–1)
BUN SERPL-MCNC: 36 MG/DL (ref 5–25)
CALCIUM SERPL-MCNC: 9.5 MG/DL (ref 8.3–10.1)
CHLORIDE SERPL-SCNC: 105 MMOL/L (ref 100–108)
CO2 SERPL-SCNC: 30 MMOL/L (ref 21–32)
CREAT SERPL-MCNC: 1.83 MG/DL (ref 0.6–1.3)
EOSINOPHIL # BLD AUTO: 0.25 THOUSAND/ΜL (ref 0–0.61)
EOSINOPHIL NFR BLD AUTO: 4 % (ref 0–6)
ERYTHROCYTE [DISTWIDTH] IN BLOOD BY AUTOMATED COUNT: 12.9 % (ref 11.6–15.1)
GFR SERPL CREATININE-BSD FRML MDRD: 27 ML/MIN/1.73SQ M
GLUCOSE SERPL-MCNC: 112 MG/DL (ref 65–140)
HCT VFR BLD AUTO: 37.8 % (ref 34.8–46.1)
HGB BLD-MCNC: 12.3 G/DL (ref 11.5–15.4)
IMM GRANULOCYTES # BLD AUTO: 0.03 THOUSAND/UL (ref 0–0.2)
IMM GRANULOCYTES NFR BLD AUTO: 1 % (ref 0–2)
INR PPP: 2.44 (ref 0.84–1.19)
LYMPHOCYTES # BLD AUTO: 1.74 THOUSANDS/ΜL (ref 0.6–4.47)
LYMPHOCYTES NFR BLD AUTO: 28 % (ref 14–44)
MAGNESIUM SERPL-MCNC: 2.1 MG/DL (ref 1.6–2.6)
MCH RBC QN AUTO: 34.2 PG (ref 26.8–34.3)
MCHC RBC AUTO-ENTMCNC: 32.5 G/DL (ref 31.4–37.4)
MCV RBC AUTO: 105 FL (ref 82–98)
MONOCYTES # BLD AUTO: 0.58 THOUSAND/ΜL (ref 0.17–1.22)
MONOCYTES NFR BLD AUTO: 9 % (ref 4–12)
NEUTROPHILS # BLD AUTO: 3.56 THOUSANDS/ΜL (ref 1.85–7.62)
NEUTS SEG NFR BLD AUTO: 58 % (ref 43–75)
NRBC BLD AUTO-RTO: 0 /100 WBCS
PLATELET # BLD AUTO: 170 THOUSANDS/UL (ref 149–390)
PMV BLD AUTO: 12.3 FL (ref 8.9–12.7)
POTASSIUM SERPL-SCNC: 3.8 MMOL/L (ref 3.5–5.3)
PROTHROMBIN TIME: 26.8 SECONDS (ref 11.6–14.5)
RBC # BLD AUTO: 3.6 MILLION/UL (ref 3.81–5.12)
SODIUM SERPL-SCNC: 144 MMOL/L (ref 136–145)
WBC # BLD AUTO: 6.18 THOUSAND/UL (ref 4.31–10.16)

## 2019-10-07 PROCEDURE — 99231 SBSQ HOSP IP/OBS SF/LOW 25: CPT | Performed by: INTERNAL MEDICINE

## 2019-10-07 PROCEDURE — 85025 COMPLETE CBC W/AUTO DIFF WBC: CPT | Performed by: PHYSICIAN ASSISTANT

## 2019-10-07 PROCEDURE — 80048 BASIC METABOLIC PNL TOTAL CA: CPT | Performed by: PHYSICIAN ASSISTANT

## 2019-10-07 PROCEDURE — 83735 ASSAY OF MAGNESIUM: CPT | Performed by: PHYSICIAN ASSISTANT

## 2019-10-07 PROCEDURE — 99232 SBSQ HOSP IP/OBS MODERATE 35: CPT | Performed by: PHYSICIAN ASSISTANT

## 2019-10-07 PROCEDURE — 85610 PROTHROMBIN TIME: CPT | Performed by: PHYSICIAN ASSISTANT

## 2019-10-07 RX ORDER — POTASSIUM CHLORIDE 20 MEQ/1
20 TABLET, EXTENDED RELEASE ORAL 2 TIMES DAILY WITH MEALS
Status: DISCONTINUED | OUTPATIENT
Start: 2019-10-07 | End: 2019-10-12

## 2019-10-07 RX ORDER — METOLAZONE 5 MG/1
5 TABLET ORAL DAILY
Status: DISCONTINUED | OUTPATIENT
Start: 2019-10-07 | End: 2019-10-11

## 2019-10-07 RX ADMIN — LORAZEPAM 0.5 MG: 0.5 TABLET ORAL at 17:50

## 2019-10-07 RX ADMIN — Medication 250 MG: at 09:32

## 2019-10-07 RX ADMIN — VITAMIN D, TAB 1000IU (100/BT) 1000 UNITS: 25 TAB at 09:34

## 2019-10-07 RX ADMIN — Medication 250 MG: at 17:51

## 2019-10-07 RX ADMIN — POTASSIUM CHLORIDE 20 MEQ: 1500 TABLET, EXTENDED RELEASE ORAL at 09:41

## 2019-10-07 RX ADMIN — METOLAZONE 5 MG: 5 TABLET ORAL at 18:08

## 2019-10-07 RX ADMIN — LEVOTHYROXINE SODIUM 88 MCG: 88 TABLET ORAL at 05:47

## 2019-10-07 RX ADMIN — DIVALPROEX SODIUM 250 MG: 250 TABLET, FILM COATED, EXTENDED RELEASE ORAL at 09:41

## 2019-10-07 RX ADMIN — CLOPIDOGREL BISULFATE 75 MG: 75 TABLET ORAL at 09:41

## 2019-10-07 RX ADMIN — LORAZEPAM 0.5 MG: 0.5 TABLET ORAL at 09:41

## 2019-10-07 RX ADMIN — POTASSIUM CHLORIDE 20 MEQ: 1500 TABLET, EXTENDED RELEASE ORAL at 17:50

## 2019-10-07 RX ADMIN — CARBAMAZEPINE 100 MG: 100 TABLET, CHEWABLE ORAL at 20:38

## 2019-10-07 RX ADMIN — FUROSEMIDE 80 MG: 10 INJECTION, SOLUTION INTRAMUSCULAR; INTRAVENOUS at 09:42

## 2019-10-07 RX ADMIN — WARFARIN SODIUM 1 MG: 2 TABLET ORAL at 17:51

## 2019-10-07 RX ADMIN — DIVALPROEX SODIUM 250 MG: 250 TABLET, FILM COATED, EXTENDED RELEASE ORAL at 17:50

## 2019-10-07 RX ADMIN — CARBAMAZEPINE 100 MG: 100 TABLET, CHEWABLE ORAL at 17:50

## 2019-10-07 RX ADMIN — METOPROLOL TARTRATE 25 MG: 25 TABLET, FILM COATED ORAL at 15:27

## 2019-10-07 RX ADMIN — FERROUS SULFATE TAB 325 MG (65 MG ELEMENTAL FE) 325 MG: 325 (65 FE) TAB at 17:50

## 2019-10-07 RX ADMIN — FLUTICASONE FUROATE AND VILANTEROL TRIFENATATE 1 PUFF: 100; 25 POWDER RESPIRATORY (INHALATION) at 09:48

## 2019-10-07 RX ADMIN — FUROSEMIDE 80 MG: 10 INJECTION, SOLUTION INTRAMUSCULAR; INTRAVENOUS at 17:46

## 2019-10-07 RX ADMIN — BUPROPION HYDROCHLORIDE 150 MG: 150 TABLET, FILM COATED, EXTENDED RELEASE ORAL at 09:34

## 2019-10-07 RX ADMIN — ATORVASTATIN CALCIUM 10 MG: 10 TABLET, FILM COATED ORAL at 09:34

## 2019-10-07 RX ADMIN — CARBAMAZEPINE 100 MG: 100 TABLET, CHEWABLE ORAL at 09:41

## 2019-10-07 RX ADMIN — FERROUS SULFATE TAB 325 MG (65 MG ELEMENTAL FE) 325 MG: 325 (65 FE) TAB at 09:41

## 2019-10-07 NOTE — ASSESSMENT & PLAN NOTE
· CKD 3: patient did see her Nephrologist 10/3 in [place]    Monitor Cr closely on IV diuretics, BUN/Cr up from yesterday, will repeat BMP in am     Results from last 7 days   Lab Units 10/07/19  0452 10/06/19  0856 10/04/19  0518 10/03/19  1532 09/30/19 2008   BUN mg/dL 36* 26* 27* 28* 33*   CREATININE mg/dL 1 83* 1 63* 1 56* 1 61* 1 46*   EGFR ml/min/1 73sq m 27 31 33 32 35

## 2019-10-07 NOTE — PROGRESS NOTES
Cardiology Progress Note    Assessment/Plan   Atrial flutter with rapid ventricular response  Possible systolic cardiomyopathy on echocardiogram (unclear based on rate)  HFpEF, persistent  PAD s/p complex LE revascularization and covered stent placement after stent fracture  Hx of CAD s/p prior PCI  Dementia  CKD3          Allow amiodarone and metoprolol to control HR  Change hold parameters on metoprolol  Continue warfarin for anti-coagulation  Rales in lung bases persist   Add metolazone  Daily weights, I/O  Monitor renal function  Keep K > 4, Mg > 2, Ca > 8 and PO4 > 2 5      Will follow  LOS: 4 days     Subjective   HR has been variable, as low as 96, as high as 120s  Metoprolol held in light of SBP < 110, then HR increased again  Creatinine rising  Objective     Vital signs in last 24 hours:  Temp:  [97 7 °F (36 5 °C)-98 2 °F (36 8 °C)] 98 2 °F (36 8 °C)  HR:  [] 122  Resp:  [17-22] 18  BP: (100-154)/(69-89) 154/89      Intake/Output Summary (Last 24 hours) at 10/7/2019 1327  Last data filed at 10/7/2019 0830  Gross per 24 hour   Intake 749 67 ml   Output 1250 ml   Net -500 33 ml     Weight (last 2 days)     Date/Time   Weight    10/07/19 0600   81 1 (178 79) pt did not wish to stand     Weight: pt did not wish to stand  at 10/07/19 0600    10/06/19 0600   81 4 (179 45)    10/05/19 1524   83 9 (185)                Physical Exam:  General:          AAOx3, NAD  HEENT:           Normocephalic/Atraumatic, No thyromegaly, lymphadenopathy or carotid bruits  There is JVD  Cardiovasc:     Regular rhythm with a normal rate  No murmurs, rubs or gallops  Radial, carotid, femoral pulses are 2+/4  Chest/Pulm:    Bibasilar rales  Abdomen: +BSx4, Soft, non-tender  No organomegaly, rebound, rigidity or guarding  Extremities:     No edema            Scheduled Meds:  Current Facility-Administered Medications:  acetaminophen 650 mg Oral Q6H PRN Paolo Sage, DO    albuterol 2 puff Inhalation Q6H PRN [place] Dawkins, DO    amiodarone (CORDARONE) IV bolus 150 mg Intravenous Once Sai Payton MD    Followed by        amiodarone 0 5 mg/min Intravenous Continuous Sai Payton MD Last Rate: 0 5 mg/min (10/06/19 2343)   atorvastatin 10 mg Oral Daily Apolo Sage, DO    buPROPion 150 mg [facility] Daily [facility]    butalbital-acetaminophen-caffeine 1 tablet Oral Q4H PRN [facility], DO    carBAMazepine 100 mg Oral TID [facility], DO    cholecalciferol 1,000 Units Oral Daily Paolo [facility], DO    clopidogrel 75 mg Oral Daily [facility], DO    diltiazem 10 mg Intravenous Q4H PRN [facility], [facility]    divalproex sodium 250 mg Oral BID [facility], DO    ferrous sulfate 325 mg Oral BID [facility], DO    fluticasone-vilanterol 1 puff Inhalation Daily [facility], DO    furosemide 80 mg Intravenous BID (diuretic) Donovanalison Zhou, DO    levothyroxine 88 mcg Oral Daily [facility], DO    LORazepam 0 5 mg Oral BID [facility], DO    metoprolol tartrate 25 mg Oral Q12H [address] Donovan Lynd, DO    ondansetron 4 mg Intravenous Q4H PRN [facility], DO    potassium chloride 20 mEq Oral Daily [facility], DO    saccharomyces boulardii 250 mg Oral BID [facility], DO    senna-docusate sodium 1 tablet Oral Daily PRN [facility], DO    warfarin 1 mg Oral Daily (warfarin) Lynn Patel PA-C      Continuous Infusions:  amiodarone 0 5 mg/min Last Rate: 0 5 mg/min (10/06/19 2343)     PRN Meds:   acetaminophen    albuterol    butalbital-acetaminophen-caffeine    diltiazem    ondansetron    senna-docusate sodium          Lab Review   Results for Alba Naranjo (MRN [id number]) as of 10/7/2019 13:24   10/7/2019 04:52   Sodium 144   Potassium 3 8   Chloride 105   CO2 30   Anion Gap 9   BUN 36 (H)   Creatinine 1 83 (H)   Glucose, Random 112   Calcium 9 5   eGFR 27   Magnesium 2 1     Results for Alba Naranjo (MRN [id number]) as of 10/7/2019 13:24   10/7/2019 04:52   WBC 6 18 Red Blood Cell Count 3 60 (L)   Hemoglobin 12 3   HCT 37 8    (H)   MCH 34 2   MCHC 32 5   RDW 12 9   Platelet Count 485     Results for Mickey Chirinos (MRN [id number]) as of 10/7/2019 13:24   10/7/2019 04:52   Protime 26 8 (H)   INR 2 44 (H)

## 2019-10-07 NOTE — PROGRESS NOTES
Progress Note Madison Delcid 1945, 69 y o  female MRN: [id number]    Unit/Bed#: -01 Encounter: [id number]    Primary Care Provider: Emily Gage DO   Date and time admitted to hospital: 10/3/2019  3:21 PM        * Acute on chronic diastolic congestive heart failure [facility])  Assessment & Plan  Wt Readings from Last 3 Encounters:   10/07/19 81 1 kg (178 lb 12 7 oz)   09/30/19 88 3 kg (194 lb 10 7 oz)   07/18/19 83 5 kg (184 lb)     · Acute on chronic diastolic congestive heart failure on admission  Patient has had approximately 10 lb weight gain by report  · Echo shows an EF of 45%, study was technically difficult to evaluate for diastolic dysfunction  · CXR showing cardiomegaly  Pro-BNP elevated 12k  Troponin <0 02  · On Furosemide IV, dose increased 10/6 with significant improvement overnight  Patient symptomatically better, though she is a poor historian at baseline  · Cardiology input appreciated  Would anticipate transition to oral amiodarone the next 24 hours of continues to do well  · Daily weights, strict I/Os, low sodium diet  · Monitor electrolytes: keep K+ >4 and Mg >2  Atrial flutter ([facility])  Assessment & Plan  · Atrial fibrillation/flutter with RVR requiring IV Diltiazem in the ED  · Previously was on upwards of Diltiazem  mg BID  and titrated down during last Cardiology visit to 30 mg TID  · Cardiology input appreciated  On Amiodarone gtt and Metoprolol  · Continue Telemetry monitor for better rate control  · Continue Coumadin - supratherapeutic INR 10/6 at 3 09, now in range at 2 44 - likely interaction with amiodarone  Continue decreased dose of warfarin and daily INR  Essential hypertension  Assessment & Plan  · Essential hypertension, continue metoprolol  PAD (peripheral artery disease) ([facility])  Assessment & Plan  · Peripheral artery disease status post lower extremity stenting  No evidence of claudication at this time      · Continue Clopidogrel and Atorvastatin  Hypothyroidism  Assessment & Plan  · Hypothyroidism, continue Levothyroxine, normal TSH  Stage 3 chronic kidney disease ([address] )  Assessment & Plan  · CKD 3: patient did see her Nephrologist 10/3 in [facility]  Monitor Cr closely on IV diuretics, BUN/Cr up from yesterday, will repeat BMP in am     Results from last 7 days   Lab Units 10/07/19  0452 10/06/19  0856 10/04/19  0518 10/03/19  1532 09/30/19 2008   BUN mg/dL 36* 26* 27* 28* 33*   CREATININE mg/dL 1 83* 1 63* 1 56* 1 61* 1 46*   EGFR ml/min/1 73sq m 27 31 33 32 35       Urinary retention  Assessment & Plan  · Chronic urinary retention with catheter  Was recently seen in the emergency department on 9/30/2019 where she was started on Ciprofloxacin  Urine culture at that time showed E coli  · Did discontinue further antibiotics as patient has received three days of effective treatment as of admit  Hyperlipidemia  Assessment & Plan  · Hyperlipidemia  Continue Atorvastatin  History of cerebral aneurysm repair  Assessment & Plan  · History of cerebral aneurysm status post clipping in 1996  Subsequently has migraine headaches  Patient does follow up with [address] [place] [address] Neurology and is maintained on Carbamazepine and Depakote  Dementia [facility])  Assessment & Plan  · Dementia without behavioral disturbance  Patient does have underlying depression and mood currently stable on Wellbutrin and Lorazepam     VTE Pharmacologic Prophylaxis:   Pharmacologic: Warfarin (Coumadin)  Mechanical VTE Prophylaxis in Place: Yes    Patient Centered Rounds: I have performed bedside rounds with nursing staff today  Discussions with Specialists or Other Care Team Provider:  Cardiology, case management    Education and Discussions with Family / Patient:  Patient, also reached out to daughter Emilie Grief    Time Spent for Care: 15 minutes  More than 50% of total time spent on counseling and coordination of care as described above      Current Length of Stay: 4 day(s)    Current Patient Status: Inpatient   Certification Statement: The patient will continue to require additional inpatient hospital stay due to IV diuresis, IV amiodarone    Discharge Plan: pending 24-48 hours    Code Status: Level 1 - Full Code      Subjective:   Patient seen this morning, pleasant but confused  She thinks she came to the hospital yesterday and thinks that she came here for her breathing only  Denies any chest pain or palpitations  Denies shortness of breath  Denies any dizziness  She has chronic indwelling Davis and denies any discomfort from that  She feels like her right leg still remains a little bit swollen compared to her baseline, however feels that her legs are less swollen  Objective:     Vitals:   Temp (24hrs), Av °F (36 7 °C), Min:97 7 °F (36 5 °C), Max:98 2 °F (36 8 °C)    Temp:  [97 7 °F (36 5 °C)-98 2 °F (36 8 °C)] 98 2 °F (36 8 °C)  HR:  [] 122  Resp:  [17-22] 18  BP: (100-154)/(69-89) 154/89  SpO2:  [91 %-95 %] 95 %  Body mass index is 32 7 kg/m²  Input and Output Summary (last 24 hours): Intake/Output Summary (Last 24 hours) at 10/7/2019 1431  Last data filed at 10/7/2019 0830  Gross per 24 hour   Intake 749 67 ml   Output 1250 ml   Net -500 33 ml       Physical Exam:     Physical Exam   Constitutional: She appears well-developed and well-nourished  No distress  HENT:   Mouth/Throat: Oropharynx is clear and moist    Cardiovascular: Normal rate, S1 normal, S2 normal, normal heart sounds and intact distal pulses  An irregularly irregular rhythm present  Pulmonary/Chest: Effort normal and breath sounds normal  No respiratory distress  She has no wheezes  She has no rales  Abdominal: Soft  Bowel sounds are normal  She exhibits no distension  There is no tenderness  Musculoskeletal: She exhibits edema ( trace, bilateral)  Neurological: She is alert  She is disoriented (Pleasantly)  No cranial nerve deficit     Nursing note and vitals reviewed  Additional Data:     Labs:    Results from last 7 days   Lab Units 10/07/19  0452   WBC Thousand/uL 6 18   HEMOGLOBIN g/dL 12 3   HEMATOCRIT % 37 8   PLATELETS Thousands/uL 170   NEUTROS PCT % 58   LYMPHS PCT % 28   MONOS PCT % 9   EOS PCT % 4     Results from last 7 days   Lab Units 10/07/19  0452  10/04/19  0518   SODIUM mmol/L 144   < > 146*   POTASSIUM mmol/L 3 8   < > 4 0   CHLORIDE mmol/L 105   < > 109*   CO2 mmol/L 30   < > 28   BUN mg/dL 36*   < > 27*   CREATININE mg/dL 1 83*   < > 1 56*   ANION GAP mmol/L 9   < > 9   CALCIUM mg/dL 9 5   < > 9 0   ALBUMIN g/dL  --   --  3 1*   TOTAL BILIRUBIN mg/dL  --   --  0 40   ALK PHOS U/L  --   --  55   ALT U/L  --   --  30   AST U/L  --   --  29   GLUCOSE RANDOM mg/dL 112   < > 127    < > = values in this interval not displayed  Results from last 7 days   Lab Units 10/07/19  0452   INR  2 44*     Results from last 7 days   Lab Units 10/06/19  0627 10/05/19  1811   POC GLUCOSE mg/dl 102 130                 * I Have Reviewed All Lab Data Listed Above  * Additional Pertinent Lab Tests Reviewed:  All Labs Within Last 24 Hours Reviewed    Imaging:    Imaging Reports Reviewed Today Include:   Imaging Personally Reviewed by Myself Includes:  Tele - AFib, still with intermittent elevations    Recent Cultures (last 7 days):     Results from last 7 days   Lab Units 09/30/19  1952   URINE CULTURE  >100,000 cfu/ml Escherichia coli*  40,000-49,000 cfu/ml        Last 24 Hours Medication List:     Current Facility-Administered Medications:  acetaminophen 650 mg Oral Q6H PRN Michelle Cuevas DO    albuterol 2 puff Inhalation Q6H PRN Michelle Cuevas DO    amiodarone (CORDARONE) IV bolus 150 mg Intravenous Once Carmen Leyva MD    Followed by        amiodarone 0 5 mg/min Intravenous Continuous Carmen Leyva MD Last Rate: 0 5 mg/min (10/06/19 2873)   atorvastatin 10 mg Oral Daily Paolo Cazares DO    buPROPion 150 mg Oral Daily Michelle Cuevas DO butalbital-acetaminophen-caffeine 1 tablet Oral Q4H PRN Josiane Campos, DO    carBAMazepine 100 mg Oral TID Josiane Campos, DO    cholecalciferol 1,000 Units Oral Daily Paolo Cazares, DO    clopidogrel 75 mg Oral Daily Paolo Cazares, DO    diltiazem 10 mg Intravenous Q4H PRN Josiane Lars, DO    divalproex sodium 250 mg Oral BID Paolo Cazares, DO    ferrous sulfate 325 mg Oral BID Josiane Campos, DO    fluticasone-vilanterol 1 puff Inhalation Daily Paolo Cazares, DO    furosemide 80 mg Intravenous BID (diuretic) Lord Lover, DO    levothyroxine 88 mcg Oral Daily Paolo Luong, DO    LORazepam 0 5 mg Oral BID Josiane Campos, DO    metoprolol tartrate 25 mg Oral Q12H [address] Lord Lover, DO    ondansetron 4 mg Intravenous Q4H PRN Josiane Campos, DO    potassium chloride 20 mEq Oral BID With Meals Azalia Rueda PA-C    saccharomyces boulardii 250 mg Oral BID Paolo Cazares, DO    senna-docusate sodium 1 tablet Oral Daily PRN Josiane Campos, DO    warfarin 1 mg Oral Daily (warfarin) Rachael Stroud PA-C         Today, Patient Was Seen By: Francis Gibson PA-C    ** Please Note: Dictation voice to text software may have been used in the creation of this document   **

## 2019-10-07 NOTE — ASSESSMENT & PLAN NOTE
· History of cerebral aneurysm status post clipping in 1996  Subsequently has migraine headaches  Patient does follow up with [facility] Neurology and is maintained on Carbamazepine and Depakote

## 2019-10-07 NOTE — SOCIAL WORK
CM discussed pt in care cordination rounds  CM met w/ pt for d/c planning   Pt aware of CM role  Pt lives at [facility]  Pt has DME, rolling walker & cane  Pt is  Needs assist w/ all ADLS & Ambulatwes w/ RW & cane  Alysa Babs uses [facility]  PCP Dr Parvin Alex is Retired, M D C  [facility] short term rehab, psych or D & A Admission history  Emergency contact/daughter Sunshine Steward  Used  [address] [place] [address] in the past   CM reviewed d/c planning process including the following: identifying help at home, patient preference for d/c planning needs, Discharge Lounge, [facility] Meds to Bed program, availability of treatment team to discuss questions or concerns patient and/or family may have regarding understanding medications and recognizing signs and symptoms once discharged  CM also encouraged patient to follow up with all recommended appointments after discharge  Patient advised of importance for patient and family to participate in managing patients medical well being

## 2019-10-07 NOTE — PLAN OF CARE
Problem: Potential for Falls  Goal: Patient will remain free of falls  Description  INTERVENTIONS:  - Assess patient frequently for physical needs  -  Identify cognitive and physical deficits and behaviors that affect risk of falls  -  [place] fall precautions as indicated by assessment   - Educate patient/family on patient safety including physical limitations  - Instruct patient to call for assistance with activity based on assessment  - Modify environment to reduce risk of injury  - Consider OT/PT consult to assist with strengthening/mobility  Outcome: Progressing     Problem: Prexisting or High Potential for Compromised Skin Integrity  Goal: Skin integrity is maintained or improved  Description  INTERVENTIONS:  - Identify patients at risk for skin breakdown  - Assess and monitor skin integrity  - Assess and monitor nutrition and hydration status  - Monitor labs   - Assess for incontinence   - Turn and reposition patient  - Assist with mobility/ambulation  - Relieve pressure over bony prominences  - Avoid friction and shearing  - Provide appropriate hygiene as needed including keeping skin clean and dry  - Evaluate need for skin moisturizer/barrier cream  - Collaborate with interdisciplinary team   - Patient/family teaching  - Consider wound care consult   Outcome: Progressing     Problem: SAFETY ADULT  Goal: Patient will remain free of falls  Description  INTERVENTIONS:  - Assess patient frequently for physical needs  -  Identify cognitive and physical deficits and behaviors that affect risk of falls    -  [place] fall precautions as indicated by assessment   - Educate patient/family on patient safety including physical limitations  - Instruct patient to call for assistance with activity based on assessment  - Modify environment to reduce risk of injury  - Consider OT/PT consult to assist with strengthening/mobility  Outcome: Progressing  Goal: Maintain or return to baseline ADL function  Description  INTERVENTIONS:  -  Assess patient's ability to carry out ADLs; assess patient's baseline for ADL function and identify physical deficits which impact ability to perform ADLs (bathing, care of mouth/teeth, toileting, grooming, dressing, etc )  - Assess/evaluate cause of self-care deficits   - Assess range of motion  - Assess patient's mobility; develop plan if impaired  - Assess patient's need for assistive devices and provide as appropriate  - Encourage maximum independence but intervene and supervise when necessary  - Involve family in performance of ADLs  - Assess for home care needs following discharge   - Consider OT consult to assist with ADL evaluation and planning for discharge  - Provide patient education as appropriate  Outcome: Progressing  Goal: Maintain or return mobility status to optimal level  Description  INTERVENTIONS:  - Assess patient's baseline mobility status (ambulation, transfers, stairs, etc )    - Identify cognitive and physical deficits and behaviors that affect mobility  - Identify mobility aids required to assist with transfers and/or ambulation (gait belt, sit-to-stand, lift, walker, cane, etc )  - [place] fall precautions as indicated by assessment  - Record patient progress and toleration of activity level on Mobility SBAR; progress patient to next Phase/Stage  - Instruct patient to call for assistance with activity based on assessment  - Consider rehabilitation consult to assist with strengthening/weightbearing, etc   Outcome: Progressing     Problem: DISCHARGE PLANNING  Goal: Discharge to home or other facility with appropriate resources  Description  INTERVENTIONS:  - Identify barriers to discharge w/patient and caregiver  - Arrange for needed discharge resources and transportation as appropriate  - Identify discharge learning needs (meds, wound care, etc )  - Arrange for interpretive services to assist at discharge as needed  - Refer to Case Management Department for coordinating discharge planning if the patient needs post-hospital services based on physician/advanced practitioner order or complex needs related to functional status, cognitive ability, or social support system  Outcome: Progressing     Problem: Knowledge Deficit  Goal: Patient/family/caregiver demonstrates understanding of disease process, treatment plan, medications, and discharge instructions  Description  Complete learning assessment and assess knowledge base    Interventions:  - Provide teaching at level of understanding  - Provide teaching via preferred learning methods  Outcome: Progressing     Problem: CARDIOVASCULAR - ADULT  Goal: Maintains optimal cardiac output and hemodynamic stability  Description  INTERVENTIONS:  - Monitor I/O, vital signs and rhythm  - Monitor for S/S and trends of decreased cardiac output  - Administer and titrate ordered vasoactive medications to optimize hemodynamic stability  - Assess quality of pulses, skin color and temperature  - Assess for signs of decreased coronary artery perfusion  - Instruct patient to report change in severity of symptoms  Outcome: Progressing  Goal: Absence of cardiac dysrhythmias or at baseline rhythm  Description  INTERVENTIONS:  - Continuous cardiac monitoring, vital signs, obtain 12 lead EKG if ordered  - Administer antiarrhythmic and heart rate control medications as ordered  - Monitor electrolytes and administer replacement therapy as ordered  Outcome: Progressing     Problem: GENITOURINARY - ADULT  Goal: Maintains or returns to baseline urinary function  Description  INTERVENTIONS:  - Assess urinary function  - Encourage oral fluids to ensure adequate hydration if ordered  - Administer IV fluids as ordered to ensure adequate hydration  - Administer ordered medications as needed  - Offer frequent toileting  - Follow urinary retention protocol if ordered  Outcome: Progressing  Goal: Absence of urinary retention  Description  INTERVENTIONS:  - Assess patients ability to void and empty bladder  - Monitor I/O  - Bladder scan as needed  - Discuss with physician/AP medications to alleviate retention as needed  - Discuss catheterization for long term situations as appropriate  Outcome: Progressing  Goal: Urinary catheter remains patent  Description  INTERVENTIONS:  - Assess patency of urinary catheter  - If patient has a chronic baca, consider changing catheter if non-functioning  - Follow guidelines for intermittent irrigation of non-functioning urinary catheter  Outcome: Progressing     Problem: MUSCULOSKELETAL - ADULT  Goal: Maintain or return mobility to safest level of function  Description  INTERVENTIONS:  - Assess patient's ability to carry out ADLs; assess patient's baseline for ADL function and identify physical deficits which impact ability to perform ADLs (bathing, care of mouth/teeth, toileting, grooming, dressing, etc )  - Assess/evaluate cause of self-care deficits   - Assess range of motion  - Assess patient's mobility  - Assess patient's need for assistive devices and provide as appropriate  - Encourage maximum independence but intervene and supervise when necessary  - Involve family in performance of ADLs  - Assess for home care needs following discharge   - Consider OT consult to assist with ADL evaluation and planning for discharge  - Provide patient education as appropriate  Outcome: Progressing

## 2019-10-07 NOTE — ASSESSMENT & PLAN NOTE
· Atrial fibrillation/flutter with RVR requiring IV Diltiazem in the ED  · Previously was on upwards of Diltiazem  mg BID  and titrated down during last Cardiology visit to 30 mg TID  · Cardiology input appreciated  On Amiodarone gtt and Metoprolol  · Continue Telemetry monitor for better rate control  · Continue Coumadin - supratherapeutic INR 10/6 at 3 09, now in range at 2 44 - likely interaction with amiodarone  Continue decreased dose of warfarin and daily INR

## 2019-10-08 ENCOUNTER — APPOINTMENT (INPATIENT)
Dept: CT IMAGING | Facility: HOSPITAL | Age: 74
DRG: 286 | End: 2019-10-08
Payer: MEDICARE

## 2019-10-08 ENCOUNTER — APPOINTMENT (INPATIENT)
Dept: INTERVENTIONAL RADIOLOGY/VASCULAR | Facility: HOSPITAL | Age: 74
DRG: 286 | End: 2019-10-08
Attending: INTERNAL MEDICINE
Payer: MEDICARE

## 2019-10-08 LAB
ANION GAP SERPL CALCULATED.3IONS-SCNC: 12 MMOL/L (ref 4–13)
BUN SERPL-MCNC: 40 MG/DL (ref 5–25)
CALCIUM SERPL-MCNC: 9.6 MG/DL (ref 8.3–10.1)
CHLORIDE SERPL-SCNC: 102 MMOL/L (ref 100–108)
CO2 SERPL-SCNC: 31 MMOL/L (ref 21–32)
CREAT SERPL-MCNC: 1.91 MG/DL (ref 0.6–1.3)
DIGOXIN SERPL-MCNC: <0.2 NG/ML (ref 0.8–2)
GFR SERPL CREATININE-BSD FRML MDRD: 26 ML/MIN/1.73SQ M
GLUCOSE SERPL-MCNC: 119 MG/DL (ref 65–140)
INR PPP: 2.18 (ref 0.84–1.19)
MAGNESIUM SERPL-MCNC: 2.1 MG/DL (ref 1.6–2.6)
POTASSIUM SERPL-SCNC: 3.4 MMOL/L (ref 3.5–5.3)
PROTHROMBIN TIME: 24.5 SECONDS (ref 11.6–14.5)
SODIUM SERPL-SCNC: 145 MMOL/L (ref 136–145)
VALPROATE SERPL-MCNC: 38 UG/ML (ref 50–100)

## 2019-10-08 PROCEDURE — 80162 ASSAY OF DIGOXIN TOTAL: CPT | Performed by: INTERNAL MEDICINE

## 2019-10-08 PROCEDURE — 80048 BASIC METABOLIC PNL TOTAL CA: CPT | Performed by: PHYSICIAN ASSISTANT

## 2019-10-08 PROCEDURE — 80164 ASSAY DIPROPYLACETIC ACD TOT: CPT | Performed by: PHYSICIAN ASSISTANT

## 2019-10-08 PROCEDURE — 93451 RIGHT HEART CATH: CPT | Performed by: INTERNAL MEDICINE

## 2019-10-08 PROCEDURE — B2141ZZ FLUOROSCOPY OF RIGHT HEART USING LOW OSMOLAR CONTRAST: ICD-10-PCS | Performed by: FAMILY MEDICINE

## 2019-10-08 PROCEDURE — 99232 SBSQ HOSP IP/OBS MODERATE 35: CPT | Performed by: INTERNAL MEDICINE

## 2019-10-08 PROCEDURE — 99232 SBSQ HOSP IP/OBS MODERATE 35: CPT | Performed by: PHYSICIAN ASSISTANT

## 2019-10-08 PROCEDURE — C1769 GUIDE WIRE: HCPCS | Performed by: INTERNAL MEDICINE

## 2019-10-08 PROCEDURE — 82810 BLOOD GASES O2 SAT ONLY: CPT

## 2019-10-08 PROCEDURE — 93451 RIGHT HEART CATH: CPT

## 2019-10-08 PROCEDURE — 80157 ASSAY CARBAMAZEPINE FREE: CPT | Performed by: PHYSICIAN ASSISTANT

## 2019-10-08 PROCEDURE — 4A023N6 MEASUREMENT OF CARDIAC SAMPLING AND PRESSURE, RIGHT HEART, PERCUTANEOUS APPROACH: ICD-10-PCS | Performed by: FAMILY MEDICINE

## 2019-10-08 PROCEDURE — C1894 INTRO/SHEATH, NON-LASER: HCPCS | Performed by: INTERNAL MEDICINE

## 2019-10-08 PROCEDURE — 83735 ASSAY OF MAGNESIUM: CPT | Performed by: PHYSICIAN ASSISTANT

## 2019-10-08 PROCEDURE — 82810 BLOOD GASES O2 SAT ONLY: CPT | Performed by: INTERNAL MEDICINE

## 2019-10-08 PROCEDURE — 85610 PROTHROMBIN TIME: CPT | Performed by: PHYSICIAN ASSISTANT

## 2019-10-08 PROCEDURE — 70450 CT HEAD/BRAIN W/O DYE: CPT

## 2019-10-08 RX ORDER — WARFARIN SODIUM 3 MG/1
3 TABLET ORAL
Status: DISCONTINUED | OUTPATIENT
Start: 2019-10-08 | End: 2019-10-12

## 2019-10-08 RX ORDER — LIDOCAINE WITH 8.4% SOD BICARB 0.9%(10ML)
SYRINGE (ML) INJECTION CODE/TRAUMA/SEDATION MEDICATION
Status: COMPLETED | OUTPATIENT
Start: 2019-10-08 | End: 2019-10-08

## 2019-10-08 RX ORDER — AMIODARONE HYDROCHLORIDE 200 MG/1
200 TABLET ORAL
Status: DISCONTINUED | OUTPATIENT
Start: 2019-10-09 | End: 2019-10-11

## 2019-10-08 RX ORDER — METOPROLOL TARTRATE 50 MG/1
50 TABLET, FILM COATED ORAL EVERY 12 HOURS SCHEDULED
Status: DISCONTINUED | OUTPATIENT
Start: 2019-10-08 | End: 2019-10-15 | Stop reason: HOSPADM

## 2019-10-08 RX ADMIN — METOPROLOL TARTRATE 25 MG: 25 TABLET, FILM COATED ORAL at 08:17

## 2019-10-08 RX ADMIN — Medication 4 ML: at 16:38

## 2019-10-08 RX ADMIN — DIVALPROEX SODIUM 250 MG: 250 TABLET, FILM COATED, EXTENDED RELEASE ORAL at 08:16

## 2019-10-08 RX ADMIN — Medication 250 MG: at 08:16

## 2019-10-08 RX ADMIN — POTASSIUM CHLORIDE 20 MEQ: 1500 TABLET, EXTENDED RELEASE ORAL at 08:17

## 2019-10-08 RX ADMIN — POTASSIUM CHLORIDE 20 MEQ: 1500 TABLET, EXTENDED RELEASE ORAL at 18:24

## 2019-10-08 RX ADMIN — LORAZEPAM 0.5 MG: 0.5 TABLET ORAL at 08:16

## 2019-10-08 RX ADMIN — BUPROPION HYDROCHLORIDE 150 MG: 150 TABLET, FILM COATED, EXTENDED RELEASE ORAL at 08:16

## 2019-10-08 RX ADMIN — LEVOTHYROXINE SODIUM 88 MCG: 88 TABLET ORAL at 06:03

## 2019-10-08 RX ADMIN — AMIODARONE HYDROCHLORIDE 0.5 MG/MIN: 50 INJECTION, SOLUTION INTRAVENOUS at 07:41

## 2019-10-08 RX ADMIN — DIVALPROEX SODIUM 250 MG: 250 TABLET, FILM COATED, EXTENDED RELEASE ORAL at 18:24

## 2019-10-08 RX ADMIN — CLOPIDOGREL BISULFATE 75 MG: 75 TABLET ORAL at 08:16

## 2019-10-08 RX ADMIN — METOPROLOL TARTRATE 25 MG: 25 TABLET, FILM COATED ORAL at 11:31

## 2019-10-08 RX ADMIN — METOPROLOL TARTRATE 50 MG: 50 TABLET, FILM COATED ORAL at 21:38

## 2019-10-08 RX ADMIN — LORAZEPAM 0.5 MG: 0.5 TABLET ORAL at 18:24

## 2019-10-08 RX ADMIN — Medication 250 MG: at 18:24

## 2019-10-08 RX ADMIN — CARBAMAZEPINE 100 MG: 100 TABLET, CHEWABLE ORAL at 08:17

## 2019-10-08 RX ADMIN — FERROUS SULFATE TAB 325 MG (65 MG ELEMENTAL FE) 325 MG: 325 (65 FE) TAB at 08:16

## 2019-10-08 RX ADMIN — METOLAZONE 5 MG: 5 TABLET ORAL at 08:16

## 2019-10-08 RX ADMIN — ATORVASTATIN CALCIUM 10 MG: 10 TABLET, FILM COATED ORAL at 08:16

## 2019-10-08 RX ADMIN — CARBAMAZEPINE 100 MG: 100 TABLET, CHEWABLE ORAL at 21:38

## 2019-10-08 RX ADMIN — FERROUS SULFATE TAB 325 MG (65 MG ELEMENTAL FE) 325 MG: 325 (65 FE) TAB at 18:23

## 2019-10-08 RX ADMIN — CARBAMAZEPINE 100 MG: 100 TABLET, CHEWABLE ORAL at 18:24

## 2019-10-08 RX ADMIN — VITAMIN D, TAB 1000IU (100/BT) 1000 UNITS: 25 TAB at 08:16

## 2019-10-08 RX ADMIN — WARFARIN SODIUM 3 MG: 3 TABLET ORAL at 18:24

## 2019-10-08 RX ADMIN — FLUTICASONE FUROATE AND VILANTEROL TRIFENATATE 1 PUFF: 100; 25 POWDER RESPIRATORY (INHALATION) at 08:17

## 2019-10-08 NOTE — ASSESSMENT & PLAN NOTE
· History of cerebral aneurysm status post clipping in 1996  Subsequently has migraine headaches  Patient does follow up with [facility] Neurology and is maintained on Carbamazepine and Depakote  · "bubble" sensation in the front of the head today, unclear etiology; neuro exam nonfocal   · STAT CT head shows no acute changes, will continue to monitor with neuro checks q 4 hrs for now    · Check depakote and tegretol levels

## 2019-10-08 NOTE — ASSESSMENT & PLAN NOTE
· CKD 3  Monitor Cr closely on IV diuretics, BUN/Cr up elevated  · TAM on CKD likely secondary IV diuresis as evidence by the climbing creatinine, will continue to monitor serial labs      Results from last 7 days   Lab Units 10/08/19  0538 10/07/19  0452 10/06/19  0856 10/04/19  0518 10/03/19  1532   BUN mg/dL 40* 36* 26* 27* 28*   CREATININE mg/dL 1 91* 1 83* 1 63* 1 56* 1 61*   EGFR ml/min/1 73sq m 26 27 31 33 32

## 2019-10-08 NOTE — PLAN OF CARE
Problem: Potential for Falls  Goal: Patient will remain free of falls  Description  INTERVENTIONS:  - Assess patient frequently for physical needs  -  Identify cognitive and physical deficits and behaviors that affect risk of falls  -  [place] fall precautions as indicated by assessment   - Educate patient/family on patient safety including physical limitations  - Instruct patient to call for assistance with activity based on assessment  - Modify environment to reduce risk of injury  - Consider OT/PT consult to assist with strengthening/mobility  Outcome: Progressing     Problem: Prexisting or High Potential for Compromised Skin Integrity  Goal: Skin integrity is maintained or improved  Description  INTERVENTIONS:  - Identify patients at risk for skin breakdown  - Assess and monitor skin integrity  - Assess and monitor nutrition and hydration status  - Monitor labs   - Assess for incontinence   - Turn and reposition patient  - Assist with mobility/ambulation  - Relieve pressure over bony prominences  - Avoid friction and shearing  - Provide appropriate hygiene as needed including keeping skin clean and dry  - Evaluate need for skin moisturizer/barrier cream  - Collaborate with interdisciplinary team   - Patient/family teaching  - Consider wound care consult   Outcome: Progressing     Problem: SAFETY ADULT  Goal: Patient will remain free of falls  Description  INTERVENTIONS:  - Assess patient frequently for physical needs  -  Identify cognitive and physical deficits and behaviors that affect risk of falls    -  [place] fall precautions as indicated by assessment   - Educate patient/family on patient safety including physical limitations  - Instruct patient to call for assistance with activity based on assessment  - Modify environment to reduce risk of injury  - Consider OT/PT consult to assist with strengthening/mobility  Outcome: Progressing  Goal: Maintain or return to baseline ADL function  Description  INTERVENTIONS:  -  Assess patient's ability to carry out ADLs; assess patient's baseline for ADL function and identify physical deficits which impact ability to perform ADLs (bathing, care of mouth/teeth, toileting, grooming, dressing, etc )  - Assess/evaluate cause of self-care deficits   - Assess range of motion  - Assess patient's mobility; develop plan if impaired  - Assess patient's need for assistive devices and provide as appropriate  - Encourage maximum independence but intervene and supervise when necessary  - Involve family in performance of ADLs  - Assess for home care needs following discharge   - Consider OT consult to assist with ADL evaluation and planning for discharge  - Provide patient education as appropriate  Outcome: Progressing  Goal: Maintain or return mobility status to optimal level  Description  INTERVENTIONS:  - Assess patient's baseline mobility status (ambulation, transfers, stairs, etc )    - Identify cognitive and physical deficits and behaviors that affect mobility  - Identify mobility aids required to assist with transfers and/or ambulation (gait belt, sit-to-stand, lift, walker, cane, etc )  - [place] fall precautions as indicated by assessment  - Record patient progress and toleration of activity level on Mobility SBAR; progress patient to next Phase/Stage  - Instruct patient to call for assistance with activity based on assessment  - Consider rehabilitation consult to assist with strengthening/weightbearing, etc   Outcome: Progressing     Problem: DISCHARGE PLANNING  Goal: Discharge to home or other facility with appropriate resources  Description  INTERVENTIONS:  - Identify barriers to discharge w/patient and caregiver  - Arrange for needed discharge resources and transportation as appropriate  - Identify discharge learning needs (meds, wound care, etc )  - Arrange for interpretive services to assist at discharge as needed  - Refer to Case Management Department for coordinating discharge planning if the patient needs post-hospital services based on physician/advanced practitioner order or complex needs related to functional status, cognitive ability, or social support system  Outcome: Progressing     Problem: Knowledge Deficit  Goal: Patient/family/caregiver demonstrates understanding of disease process, treatment plan, medications, and discharge instructions  Description  Complete learning assessment and assess knowledge base    Interventions:  - Provide teaching at level of understanding  - Provide teaching via preferred learning methods  Outcome: Progressing     Problem: CARDIOVASCULAR - ADULT  Goal: Maintains optimal cardiac output and hemodynamic stability  Description  INTERVENTIONS:  - Monitor I/O, vital signs and rhythm  - Monitor for S/S and trends of decreased cardiac output  - Administer and titrate ordered vasoactive medications to optimize hemodynamic stability  - Assess quality of pulses, skin color and temperature  - Assess for signs of decreased coronary artery perfusion  - Instruct patient to report change in severity of symptoms  Outcome: Progressing  Goal: Absence of cardiac dysrhythmias or at baseline rhythm  Description  INTERVENTIONS:  - Continuous cardiac monitoring, vital signs, obtain 12 lead EKG if ordered  - Administer antiarrhythmic and heart rate control medications as ordered  - Monitor electrolytes and administer replacement therapy as ordered  Outcome: Progressing     Problem: GENITOURINARY - ADULT  Goal: Maintains or returns to baseline urinary function  Description  INTERVENTIONS:  - Assess urinary function  - Encourage oral fluids to ensure adequate hydration if ordered  - Administer IV fluids as ordered to ensure adequate hydration  - Administer ordered medications as needed  - Offer frequent toileting  - Follow urinary retention protocol if ordered  Outcome: Progressing  Goal: Absence of urinary retention  Description  INTERVENTIONS:  - Assess patients ability to void and empty bladder  - Monitor I/O  - Bladder scan as needed  - Discuss with physician/AP medications to alleviate retention as needed  - Discuss catheterization for long term situations as appropriate  Outcome: Progressing  Goal: Urinary catheter remains patent  Description  INTERVENTIONS:  - Assess patency of urinary catheter  - If patient has a chronic baca, consider changing catheter if non-functioning  - Follow guidelines for intermittent irrigation of non-functioning urinary catheter  Outcome: Progressing     Problem: MUSCULOSKELETAL - ADULT  Goal: Maintain or return mobility to safest level of function  Description  INTERVENTIONS:  - Assess patient's ability to carry out ADLs; assess patient's baseline for ADL function and identify physical deficits which impact ability to perform ADLs (bathing, care of mouth/teeth, toileting, grooming, dressing, etc )  - Assess/evaluate cause of self-care deficits   - Assess range of motion  - Assess patient's mobility  - Assess patient's need for assistive devices and provide as appropriate  - Encourage maximum independence but intervene and supervise when necessary  - Involve family in performance of ADLs  - Assess for home care needs following discharge   - Consider OT consult to assist with ADL evaluation and planning for discharge  - Provide patient education as appropriate  Outcome: Progressing

## 2019-10-08 NOTE — PROGRESS NOTES
Received patient from Cath Lab  Patient awake and alert, not complaining of any pain  Patient has pressure dressings B/L upper extremities  Pulses palpable +2  Will continue to monitor patient

## 2019-10-08 NOTE — ASSESSMENT & PLAN NOTE
Wt Readings from Last 3 Encounters:   10/08/19 81 1 kg (178 lb 12 7 oz)   09/30/19 88 3 kg (194 lb 10 7 oz)   07/18/19 83 5 kg (184 lb)     · Acute on chronic diastolic congestive heart failure on admission  Patient has had approximately 10 lb weight gain by report  · Echo shows an EF of 45%, study was technically difficult to evaluate for diastolic dysfunction  · CXR showing cardiomegaly  Pro-BNP elevated 12k  Troponin <0 02   · IV Lasix on hold secondary to worsening renal function, planning for right heart catheterization today for better clarification of her fluid status  · Cardiology input appreciated  · Daily weights, strict I/Os, low sodium diet  · Monitor electrolytes: keep K+ >4 and Mg >2

## 2019-10-08 NOTE — PROGRESS NOTES
Cardiology Progress Note    Assessment/Plan   Atrial flutter with rapid ventricular response  Possible systolic cardiomyopathy on echocardiogram (unclear based on rate)  HFpEF  PAD s/p complex LE revascularization and covered stent placement after stent fracture  Hx of CAD s/p prior PCI  Dementia  Acute on CKD3  Subjective slurred speech      Volume status outwardly appears high, but labs suggest dehydration and there is a new O2 requirement  Increase metoprolol to 50 mg this afternoon  Amiodarone converted to  mg daily  Consider RHC to assess volume status  This would guide our diuretic management  Alerted SLIM to speech difficulties  Check digoxin level to r/o toxicity in light of renal dysfunction  Will follow  LOS: 5 days     Subjective   Tachycardia is improved, HR now in the 80's  Creatinine is rising  Oxygen requirement developed overnight  Patient complains of a sensation in her head and slurred speech  Daughter at bedside, agrees that patient is not at baseline  No definitive deficit  Objective     Vital signs in last 24 hours:  Temp:  [97 6 °F (36 4 °C)-98 2 °F (36 8 °C)] 97 8 °F (36 6 °C)  HR:  [109-127] 119  Resp:  [16-18] 18  BP: (110-186)/(71-89) 123/87      Intake/Output Summary (Last 24 hours) at 10/8/2019 1017  Last data filed at 10/8/2019 0730  Gross per 24 hour   Intake 800 4 ml   Output 2700 ml   Net -1899 6 ml     Weight (last 2 days)     Date/Time   Weight    10/08/19 0547   81 1 (178 79)    10/07/19 0600   81 1 (178 79) pt did not wish to stand     Weight: pt did not wish to stand  at 10/07/19 0600    10/06/19 0600   81 4 (179 45)                Physical Exam:   General: AAOx3, NAD  HEENT: Normocephalic/Atraumatic, No thyromegaly, lymphadenopathy or carotid bruits  JVD is present  Cardiovasc: Regularly irregular rhythm with a normal rate  No murmurs, rubs or gallops  Radial, carotid pulses are 2+/4  Chest/Pulm: Basilar rales persist   Abdomen:  +BSx4, Soft, non-tender  No organomegaly, rebound, rigidity or guarding  Extremities: No edema    Neuro:  Cranial nerves 2-12 grossly intact bilaterally, strength 5/5, DTR 2+/4        Scheduled Meds:  Current Facility-Administered Medications:  acetaminophen 650 mg Oral Q6H PRN Manjit Stade, DO    albuterol 2 puff Inhalation Q6H PRN Manjit Stade, DO    amiodarone (CORDARONE) IV bolus 150 mg Intravenous Once Taylor Mayberry MD    Followed by        amiodarone 0 5 mg/min Intravenous Continuous Taylor Mayberry MD Last Rate: 0 5 mg/min (10/08/19 0741)   atorvastatin 10 mg Oral Daily Apolo Sage, DO    buPROPion 150 mg Oral Daily Manjit Stade, DO    butalbital-acetaminophen-caffeine 1 tablet Oral Q4H PRN Manjit Stade, DO    carBAMazepine 100 mg Oral TID Manjit Stade, DO    cholecalciferol 1,000 Units Oral Daily Paolo Sage, DO    clopidogrel 75 mg Oral Daily Paolo Sage, DO    diltiazem 10 mg Intravenous Q4H PRN Manjit Stade, DO    divalproex sodium 250 mg Oral BID Paolo Sage, DO    ferrous sulfate 325 mg Oral BID Paolo Sage, DO    fluticasone-vilanterol 1 puff Inhalation Daily Manjit Stade, DO    levothyroxine 88 mcg Oral Daily Paolo Sage, DO    LORazepam 0 5 mg Oral BID Paolo Sage, DO    metolazone 5 mg Oral Daily Hamilton Hallmark, DO    metoprolol tartrate 25 mg Oral Q12H [address] Hue Hallmark, DO    ondansetron 4 mg Intravenous Q4H PRN Manjit Stade, DO    potassium chloride 20 mEq Oral BID With Meals Azalia Rueda PA-C    saccharomyces boulardii 250 mg Oral BID Manjit Stade, DO    senna-docusate sodium 1 tablet Oral Daily PRN Manjit Stade, DO    warfarin 1 mg Oral Daily (warfarin) Joanna Silva PA-C      Continuous Infusions:  amiodarone 0 5 mg/min Last Rate: 0 5 mg/min (10/08/19 0741)     PRN Meds:   acetaminophen    albuterol    butalbital-acetaminophen-caffeine    diltiazem    ondansetron    senna-docusate sodium          Lab Review   Results for Lashay Beltránpool (MRN [id number]) as of 10/8/2019 10:15   10/8/2019 05:38   Sodium 145   Potassium 3 4 (L)   Chloride 102   CO2 31   Anion Gap 12   BUN 40 (H)   Creatinine 1 91 (H)   Glucose, Random 119   Calcium 9 6   eGFR 26   Magnesium 2 1     Results for Ame Jameson (MRN [id number]) as of 10/8/2019 10:15   10/8/2019 05:38   Protime 24 5 (H)   INR 2 18 (H)

## 2019-10-08 NOTE — PROGRESS NOTES
Progress Note Ingrid Alicea 1945, 69 y o  female MRN: [id number]    Unit/Bed#: -01 Encounter: [id number]    Primary Care Provider: Suresh Tolentino DO   Date and time admitted to hospital: 10/3/2019  3:21 PM        * Acute on chronic diastolic congestive heart failure [facility])  Assessment & Plan  Wt Readings from Last 3 Encounters:   10/08/19 81 1 kg (178 lb 12 7 oz)   09/30/19 88 3 kg (194 lb 10 7 oz)   07/18/19 83 5 kg (184 lb)     · Acute on chronic diastolic congestive heart failure on admission  Patient has had approximately 10 lb weight gain by report  · Echo shows an EF of 45%, study was technically difficult to evaluate for diastolic dysfunction  · CXR showing cardiomegaly  Pro-BNP elevated 12k  Troponin <0 02   · IV Lasix on hold secondary to worsening renal function, planning for right heart catheterization today for better clarification of her fluid status  · Cardiology input appreciated  · Daily weights, strict I/Os, low sodium diet  · Monitor electrolytes: keep K+ >4 and Mg >2  Atrial flutter ([facility])  Assessment & Plan  · Atrial fibrillation/flutter with RVR requiring IV Diltiazem in the ED  · Cardiology input appreciated  On Metoprolol and transition from amiodarone drip to oral amiodarone  · Continue Telemetry  Continue Coumadin, resume 3 mg at night  Essential hypertension  Assessment & Plan  · Essential hypertension, continue metoprolol  PAD (peripheral artery disease) ([facility])  Assessment & Plan  · Peripheral artery disease status post lower extremity stenting  No evidence of claudication at this time  · Continue Clopidogrel and Atorvastatin  Hypothyroidism  Assessment & Plan  · Hypothyroidism, continue Levothyroxine, normal TSH  Stage 3 chronic kidney disease (HCC)  Assessment & Plan  · CKD 3  Monitor Cr closely on IV diuretics, BUN/Cr up elevated    · TAM on CKD likely secondary IV diuresis as evidence by the climbing creatinine, will continue to monitor serial labs  Results from last 7 days   Lab Units 10/08/19  0538 10/07/19  0452 10/06/19  0856 10/04/19  0518 10/03/19  1532   BUN mg/dL 40* 36* 26* 27* 28*   CREATININE mg/dL 1 91* 1 83* 1 63* 1 56* 1 61*   EGFR ml/min/1 73sq m 26 27 31 33 32       Urinary retention  Assessment & Plan  · Chronic urinary retention with catheter  Was recently seen in the emergency department on 9/30/2019 where she was started on Ciprofloxacin  Urine culture at that time showed E coli  · Did discontinue further antibiotics as patient has received three days of effective treatment as of admit  Hyperlipidemia  Assessment & Plan  · Hyperlipidemia  Continue Atorvastatin  History of cerebral aneurysm repair  Assessment & Plan  · History of cerebral aneurysm status post clipping in 1996  Subsequently has migraine headaches  Patient does follow up with [facility] Neurology and is maintained on Carbamazepine and Depakote  · "bubble" sensation in the front of the head today, unclear etiology; neuro exam nonfocal   · STAT CT head shows no acute changes, will continue to monitor with neuro checks q 4 hrs for now  · Check depakote and tegretol levels    Dementia ([facility] [address] )  Assessment & Plan  · Dementia without behavioral disturbance  Patient does have underlying depression and mood currently stable on Wellbutrin and Lorazepam         VTE Pharmacologic Prophylaxis:   Pharmacologic: Warfarin (Coumadin)  Mechanical VTE Prophylaxis in Place: Yes    Patient Centered Rounds: I have performed bedside rounds with nursing staff today  Discussions with Specialists or Other Care Team Provider: cardiology; CM     Education and Discussions with Family / Patient: patient, daughter at the bedside    Time Spent for Care: 20 minutes  More than 50% of total time spent on counseling and coordination of care as described above      Current Length of Stay: 5 day(s)    Current Patient Status: Inpatient   Certification Statement: The patient will continue to require additional inpatient hospital stay due to Ongoing management of fluid and heart rate, pending right heart catheterization per Cardiology    Discharge Plan:  Pending cardiology clearance    Code Status: Level 1 - Full Code      Subjective:   Patient seen this morning, she is feeling well  Denies any chest pain or shortness of breath  Reports improvement in her right lower extremity edema/left lower extremity edema  Patient seen again with the daughter at the bedside in complaining of a bubble in the front of her head  She can't really describe any more, but the daughter feels that her mother's off today  She feels her mother speech is at times slurred and that her memory is a little worse than her baseline  Objective:     Vitals:   Temp (24hrs), Av 8 °F (36 6 °C), Min:97 6 °F (36 4 °C), Max:98 1 °F (36 7 °C)    Temp:  [97 6 °F (36 4 °C)-98 1 °F (36 7 °C)] 97 8 °F (36 6 °C)  HR:  [109-127] 119  Resp:  [16-18] 18  BP: (110-186)/(71-87) 123/87  SpO2:  [88 %-94 %] 92 %  Body mass index is 32 7 kg/m²  Input and Output Summary (last 24 hours): Intake/Output Summary (Last 24 hours) at 10/8/2019 1506  Last data filed at 10/8/2019 0730  Gross per 24 hour   Intake 560 4 ml   Output 1950 ml   Net -1389 6 ml       Physical Exam:     Physical Exam   Constitutional: She appears well-developed and well-nourished  No distress  Eyes: Pupils are equal, round, and reactive to light  EOM are normal    Cardiovascular: S1 normal, S2 normal and normal heart sounds  An irregularly irregular rhythm present  Tachycardia present  No murmur heard  Pulmonary/Chest: Effort normal and breath sounds normal  No respiratory distress  She has no wheezes  She has no rales  Abdominal: Soft  Bowel sounds are normal  She exhibits no distension  There is no tenderness  Musculoskeletal: She exhibits no edema  Neurological: She is alert  She has normal strength  She is disoriented (pleasantly)   She displays no tremor  No cranial nerve deficit or sensory deficit  She exhibits normal muscle tone  Coordination normal    Patient alert and oriented to self and her daughter, she is now wear that she is at the hospital done sure for what reason  She is able to identify Trump as the present but not the year  She denies feeIng confused but reports a bubble in her forehead     No dysmetria on finger-to-nose, no difficulty with rapid alternating movement  Able to repeat no ifs ands or buts"   Psychiatric: She has a normal mood and affect  Her behavior is normal    Nursing note and vitals reviewed  Additional Data:     Labs:    Results from last 7 days   Lab Units 10/07/19  0452   WBC Thousand/uL 6 18   HEMOGLOBIN g/dL 12 3   HEMATOCRIT % 37 8   PLATELETS Thousands/uL 170   NEUTROS PCT % 58   LYMPHS PCT % 28   MONOS PCT % 9   EOS PCT % 4     Results from last 7 days   Lab Units 10/08/19  0538  10/04/19  0518   SODIUM mmol/L 145   < > 146*   POTASSIUM mmol/L 3 4*   < > 4 0   CHLORIDE mmol/L 102   < > 109*   CO2 mmol/L 31   < > 28   BUN mg/dL 40*   < > 27*   CREATININE mg/dL 1 91*   < > 1 56*   ANION GAP mmol/L 12   < > 9   CALCIUM mg/dL 9 6   < > 9 0   ALBUMIN g/dL  --   --  3 1*   TOTAL BILIRUBIN mg/dL  --   --  0 40   ALK PHOS U/L  --   --  55   ALT U/L  --   --  30   AST U/L  --   --  29   GLUCOSE RANDOM mg/dL 119   < > 127    < > = values in this interval not displayed  Results from last 7 days   Lab Units 10/08/19  0538   INR  2 18*     Results from last 7 days   Lab Units 10/06/19  0627 10/05/19  1811   POC GLUCOSE mg/dl 102 130                   * I Have Reviewed All Lab Data Listed Above  * Additional Pertinent Lab Tests Reviewed:  All Labs Within Last 24 Hours Reviewed    Imaging:    Imaging Reports Reviewed Today Include: 14 [facility]  Imaging Personally Reviewed by Myself Includes:  Telemetry - AFib with improved RVR, though still > 100    Recent Cultures (last 7 days):           Last 24 Hours Medication List:     Current Facility-Administered Medications:  acetaminophen 650 mg Oral Q6H PRN Daniel Jones, DO   albuterol 2 puff Inhalation Q6H PRN Daniel Jones, DO   [START ON 10/9/2019] amiodarone 200 mg Oral Daily With Breakfast Nadya Gasca, DO   atorvastatin 10 mg Oral Daily Paolo Cazares, DO   buPROPion 150 mg Oral Daily Daniel Jones, DO   butalbital-acetaminophen-caffeine 1 tablet Oral Q4H PRN Daniel Jones, DO   carBAMazepine 100 mg Oral TID Daniel Jones, DO   cholecalciferol 1,000 Units Oral Daily Paolo Cazares, DO   clopidogrel 75 mg Oral Daily Paolo Cazares, DO   diltiazem 10 mg Intravenous Q4H PRN Daniel Jones, DO   divalproex sodium 250 mg Oral BID Paolo Cazares, DO   ferrous sulfate 325 mg Oral BID Daniel Jones, DO   fluticasone-vilanterol 1 puff Inhalation Daily Paolo Cazares, DO   levothyroxine 88 mcg Oral Daily Paolo Cazares, DO   LORazepam 0 5 mg Oral BID Paolo Cazares, DO   metolazone 5 mg Oral Daily Nadya Gasca, DO   metoprolol tartrate 50 mg Oral Q12H [address] Nadya Gasca, DO   ondansetron 4 mg Intravenous Q4H PRN Daniel Jones, DO   potassium chloride 20 mEq Oral BID With Meals Azalia Rueda PA-C   saccharomyces boulardii 250 mg Oral BID Paolo Cazares, DO   senna-docusate sodium 1 tablet Oral Daily PRN Daniel Jones, DO   warfarin 3 mg Oral Daily (warfarin) María Patterson PA-C        Today, Patient Was Seen By: María Patterson PA-C    ** Please Note: Dictation voice to text software may have been used in the creation of this document   **

## 2019-10-08 NOTE — ASSESSMENT & PLAN NOTE
· Atrial fibrillation/flutter with RVR requiring IV Diltiazem in the ED  · Cardiology input appreciated  On Metoprolol and transition from amiodarone drip to oral amiodarone  · Continue Telemetry  Continue Coumadin, resume 3 mg at night

## 2019-10-09 LAB
ANION GAP SERPL CALCULATED.3IONS-SCNC: 8 MMOL/L (ref 4–13)
BUN SERPL-MCNC: 42 MG/DL (ref 5–25)
CALCIUM SERPL-MCNC: 9.6 MG/DL (ref 8.3–10.1)
CHLORIDE SERPL-SCNC: 104 MMOL/L (ref 100–108)
CO2 SERPL-SCNC: 30 MMOL/L (ref 21–32)
CREAT SERPL-MCNC: 1.78 MG/DL (ref 0.6–1.3)
ERYTHROCYTE [DISTWIDTH] IN BLOOD BY AUTOMATED COUNT: 12.8 % (ref 11.6–15.1)
GFR SERPL CREATININE-BSD FRML MDRD: 28 ML/MIN/1.73SQ M
GLUCOSE SERPL-MCNC: 106 MG/DL (ref 65–140)
HCT VFR BLD AUTO: 39.3 % (ref 34.8–46.1)
HGB BLD-MCNC: 12.9 G/DL (ref 11.5–15.4)
INR PPP: 2.25 (ref 0.84–1.19)
MCH RBC QN AUTO: 33.8 PG (ref 26.8–34.3)
MCHC RBC AUTO-ENTMCNC: 32.8 G/DL (ref 31.4–37.4)
MCV RBC AUTO: 103 FL (ref 82–98)
PLATELET # BLD AUTO: 175 THOUSANDS/UL (ref 149–390)
PMV BLD AUTO: 12 FL (ref 8.9–12.7)
POTASSIUM SERPL-SCNC: 3.9 MMOL/L (ref 3.5–5.3)
PROTHROMBIN TIME: 25.1 SECONDS (ref 11.6–14.5)
RBC # BLD AUTO: 3.82 MILLION/UL (ref 3.81–5.12)
SODIUM SERPL-SCNC: 142 MMOL/L (ref 136–145)
WBC # BLD AUTO: 5.94 THOUSAND/UL (ref 4.31–10.16)

## 2019-10-09 PROCEDURE — 99231 SBSQ HOSP IP/OBS SF/LOW 25: CPT | Performed by: INTERNAL MEDICINE

## 2019-10-09 PROCEDURE — 85610 PROTHROMBIN TIME: CPT | Performed by: PHYSICIAN ASSISTANT

## 2019-10-09 PROCEDURE — 99232 SBSQ HOSP IP/OBS MODERATE 35: CPT | Performed by: PHYSICIAN ASSISTANT

## 2019-10-09 PROCEDURE — 80048 BASIC METABOLIC PNL TOTAL CA: CPT | Performed by: PHYSICIAN ASSISTANT

## 2019-10-09 PROCEDURE — 85027 COMPLETE CBC AUTOMATED: CPT | Performed by: PHYSICIAN ASSISTANT

## 2019-10-09 RX ORDER — BUMETANIDE 0.25 MG/ML
2 INJECTION, SOLUTION INTRAMUSCULAR; INTRAVENOUS 2 TIMES DAILY
Status: DISCONTINUED | OUTPATIENT
Start: 2019-10-09 | End: 2019-10-10

## 2019-10-09 RX ADMIN — VITAMIN D, TAB 1000IU (100/BT) 1000 UNITS: 25 TAB at 09:51

## 2019-10-09 RX ADMIN — Medication 250 MG: at 17:02

## 2019-10-09 RX ADMIN — FERROUS SULFATE TAB 325 MG (65 MG ELEMENTAL FE) 325 MG: 325 (65 FE) TAB at 17:02

## 2019-10-09 RX ADMIN — FERROUS SULFATE TAB 325 MG (65 MG ELEMENTAL FE) 325 MG: 325 (65 FE) TAB at 09:50

## 2019-10-09 RX ADMIN — LEVOTHYROXINE SODIUM 88 MCG: 88 TABLET ORAL at 09:59

## 2019-10-09 RX ADMIN — BUMETANIDE 2 MG: 0.25 INJECTION INTRAMUSCULAR; INTRAVENOUS at 17:01

## 2019-10-09 RX ADMIN — WARFARIN SODIUM 3 MG: 3 TABLET ORAL at 17:02

## 2019-10-09 RX ADMIN — ATORVASTATIN CALCIUM 10 MG: 10 TABLET, FILM COATED ORAL at 09:51

## 2019-10-09 RX ADMIN — BUMETANIDE 2 MG: 0.25 INJECTION INTRAMUSCULAR; INTRAVENOUS at 11:31

## 2019-10-09 RX ADMIN — FLUTICASONE FUROATE AND VILANTEROL TRIFENATATE 1 PUFF: 100; 25 POWDER RESPIRATORY (INHALATION) at 09:58

## 2019-10-09 RX ADMIN — BUPROPION HYDROCHLORIDE 150 MG: 150 TABLET, FILM COATED, EXTENDED RELEASE ORAL at 09:55

## 2019-10-09 RX ADMIN — POTASSIUM CHLORIDE 20 MEQ: 1500 TABLET, EXTENDED RELEASE ORAL at 09:55

## 2019-10-09 RX ADMIN — DIVALPROEX SODIUM 250 MG: 250 TABLET, FILM COATED, EXTENDED RELEASE ORAL at 09:55

## 2019-10-09 RX ADMIN — LORAZEPAM 0.5 MG: 0.5 TABLET ORAL at 09:51

## 2019-10-09 RX ADMIN — METOPROLOL TARTRATE 50 MG: 50 TABLET, FILM COATED ORAL at 20:21

## 2019-10-09 RX ADMIN — METOLAZONE 5 MG: 5 TABLET ORAL at 09:55

## 2019-10-09 RX ADMIN — DIVALPROEX SODIUM 250 MG: 250 TABLET, FILM COATED, EXTENDED RELEASE ORAL at 17:02

## 2019-10-09 RX ADMIN — CLOPIDOGREL BISULFATE 75 MG: 75 TABLET ORAL at 09:50

## 2019-10-09 RX ADMIN — CARBAMAZEPINE 100 MG: 100 TABLET, CHEWABLE ORAL at 09:55

## 2019-10-09 RX ADMIN — CARBAMAZEPINE 100 MG: 100 TABLET, CHEWABLE ORAL at 20:21

## 2019-10-09 RX ADMIN — POTASSIUM CHLORIDE 20 MEQ: 1500 TABLET, EXTENDED RELEASE ORAL at 16:38

## 2019-10-09 RX ADMIN — LORAZEPAM 0.5 MG: 0.5 TABLET ORAL at 17:02

## 2019-10-09 RX ADMIN — METOPROLOL TARTRATE 50 MG: 50 TABLET, FILM COATED ORAL at 09:51

## 2019-10-09 RX ADMIN — AMIODARONE HYDROCHLORIDE 200 MG: 200 TABLET ORAL at 09:51

## 2019-10-09 RX ADMIN — Medication 250 MG: at 09:51

## 2019-10-09 RX ADMIN — CARBAMAZEPINE 100 MG: 100 TABLET, CHEWABLE ORAL at 16:37

## 2019-10-09 NOTE — ASSESSMENT & PLAN NOTE
· Peripheral artery disease status post lower extremity stenting  No evidence of claudication at this time  · Continue Clopidogrel and Atorvastatin  · Patient was due to have a follow-up ultrasound to ensure stents are patent on Friday, she most likely will not be discharged from the hospital in time for this, the daughter will cancel  I am not sure that we can do this inpatient, will likely need to be rescheduled outpatient

## 2019-10-09 NOTE — ASSESSMENT & PLAN NOTE
· Chronic urinary retention with catheter  Was recently seen in the emergency department on 9/30/2019 where she was started on Ciprofloxacin  Urine culture at that time showed E coli  Completed 3 day course antibiotics

## 2019-10-09 NOTE — PROGRESS NOTES
Cardiology Progress Note    Assessment/Plan   Atrial flutter with rapid ventricular response  Possible systolic cardiomyopathy on echocardiogram (unclear based on rate)  HFrEF, decompensated  PAD s/p complex LE revascularization and covered stent placement after stent fracture  Hx of CAD s/p prior PCI  Dementia  Acute on CKD3 - cardiorenal syndrome  Subjective slurred speech      IV furosemide is not effective  Convert to bumetanide 2 mg IV BID  If this is ineffective, start bumex gtt  As I/O are difficult to obtain, daily weight will be a crucial part of her treatment  Encourage ambulation  Monitor renal function  Keep K > 4, Mg > 2, Ca > 8 and PO4 > 2 5  Will follow  LOS: 6 days     Subjective   RHC shows persistent volume overload (PCWP 24, mPAP 27, RAP 20)  Converted to PO amiodarone yesterday  I/O are inaccurate  Weight is stable  CT head negative for acute abnormality  Speech is improved  Objective     Vital signs in last 24 hours:  Temp:  [97 5 °F (36 4 °C)-98 1 °F (36 7 °C)] 97 8 °F (36 6 °C)  HR:  [101-118] 104  Resp:  [17-19] 18  BP: ()/() 97/54      Intake/Output Summary (Last 24 hours) at 10/9/2019 0948  Last data filed at 10/9/2019 0900  Gross per 24 hour   Intake 840 ml   Output 200 ml   Net 640 ml     Weight (last 2 days)     Date/Time   Weight    10/09/19 0600   81 1 (178 79)    10/08/19 0547   81 1 (178 79)    10/07/19 0600   81 1 (178 79) pt did not wish to stand     Weight: pt did not wish to stand  at 10/07/19 0600                Physical Exam:  General:         AAOx3, NAD  HEENT:           Normocephalic/Atraumatic, No thyromegaly, lymphadenopathy or carotid bruits  JVD is present  Cardiovasc:     Regularly irregular rhythm with a normal rate  No murmurs, rubs or gallops  Radial, carotid pulses are 2+/4  Chest/Pulm:    Basilar rales persist   Abdomen: +BSx4, Soft, non-tender  No organomegaly, rebound, rigidity or guarding    Extremities:     No edema         Scheduled Meds:  Current Facility-Administered Medications:  acetaminophen 650 mg Oral Q6H PRN Cecilia Timo, DO   albuterol 2 puff Inhalation Q6H PRN Cecilia Timo, DO   amiodarone 200 mg Oral Daily With Breakfast Mundo Espinoza, DO   atorvastatin 10 mg Oral Daily Paolo Luong, DO   buPROPion 150 mg Oral Daily Cecilia Timo, DO   butalbital-acetaminophen-caffeine 1 tablet Oral Q4H PRN Cecilia Timo, DO   carBAMazepine 100 mg Oral TID Cecilia Timo, DO   cholecalciferol 1,000 Units Oral Daily [facility], DO   clopidogrel 75 mg Oral Daily [facility], DO   diltiazem 10 mg Intravenous Q4H PRN Cecilia Timo, DO   divalproex sodium 250 mg Oral BID [facility], DO   ferrous sulfate 325 mg Oral BID [facility], DO   fluticasone-vilanterol 1 puff Inhalation Daily [facility], DO   levothyroxine 88 mcg Oral Daily [facility], DO   LORazepam 0 5 mg Oral BID [facility], DO   metolazone 5 mg Oral Daily Mundo Espinoza, DO   metoprolol tartrate 50 mg Oral Q12H [facility], DO   ondansetron 4 mg Intravenous Q4H PRN Cecilia Timo, DO   potassium chloride 20 mEq Oral BID With Meals Azalia Rueda PA-C   saccharomyces boulardii 250 mg Oral BID Cecilia Timo, DO   senna-docusate sodium 1 tablet Oral Daily PRN Cecilia Timo, DO   warfarin 3 mg Oral Daily (warfarin) Azalia Rueda PA-C     Continuous Infusions:   PRN Meds:   acetaminophen    albuterol    butalbital-acetaminophen-caffeine    diltiazem    ondansetron    senna-docusate sodium        Lab Review   Results for Bria Mcknight (MRN [id number]) as of 10/9/2019 09:48   10/9/2019 04:47   Sodium 142   Potassium 3 9   Chloride 104   CO2 30   Anion Gap 8   BUN 42 (H)   Creatinine 1 78 (H)   Glucose, Random 106   Calcium 9 6   eGFR 28     Results for Bria Mcknight (MRN [id number]) as of 10/9/2019 09:48   10/9/2019 04:47   WBC 5 94   Red Blood Cell Count 3 82   Hemoglobin 12 9   HCT 39 3    (H)   MCH 33 8   MCHC 32 8   RDW 12 8 Platelet Count 699

## 2019-10-09 NOTE — PROGRESS NOTES
Progress Note Sen Fast 1945, 69 y o  female MRN: [id number]    Unit/Bed#: -01 Encounter: [id number]    Primary Care Provider: Magui Arrington DO   Date and time admitted to hospital: 10/3/2019  3:21 PM        * Acute on chronic diastolic congestive heart failure [facility])  Assessment & Plan  Wt Readings from Last 3 Encounters:   10/09/19 81 1 kg (178 lb 12 7 oz)   09/30/19 88 3 kg (194 lb 10 7 oz)   07/18/19 83 5 kg (184 lb)     · Acute on chronic diastolic congestive heart failure on admission  Patient has had approximately 10 lb weight gain by report  · Echo shows an EF of 45%, study was technically difficult to evaluate for diastolic dysfunction  · CXR showing cardiomegaly  Pro-BNP elevated 12k  Troponin <0 02   · IV Lasix is no longer effective, switched IV Bumex and per the daughter she has had significant output since the Bumex was given about 2 hours prior to my conversation with her  · Cardiology input appreciated, right heart catheterization conducted yesterday  · Daily weights, strict I/Os, low sodium diet  · Monitor electrolytes: keep K+ >4 and Mg >2  Atrial flutter ([facility])  Assessment & Plan  · Atrial fibrillation/flutter with RVR requiring IV Diltiazem in the ED  · Cardiology input appreciated  On Metoprolol and transitioned from amiodarone drip to oral amiodarone  · Continue Telemetry  Continue Coumadin, resume 3 mg at night  Essential hypertension  Assessment & Plan  · Essential hypertension, continue metoprolol  Controlled    PAD (peripheral artery disease) ([facility])  Assessment & Plan  · Peripheral artery disease status post lower extremity stenting  No evidence of claudication at this time  · Continue Clopidogrel and Atorvastatin  · Patient was due to have a follow-up ultrasound to ensure stents are patent on Friday, she most likely will not be discharged from the hospital in time for this, the daughter will cancel    I am not sure that we can do this inpatient, will likely need to be rescheduled outpatient  Hypothyroidism  Assessment & Plan  · Hypothyroidism, continue Levothyroxine, normal TSH  Stage 3 chronic kidney disease (HCC)  Assessment & Plan  · CKD 3  Monitor Cr closely on IV diuretics, BUN/Cr up elevated  · TAM on CKD likely secondary IV diuresis as evidence by the climbing creatinine, will continue to monitor serial labs  · Creatinine slightly improved overnight continue to monitor closely on diuresis  Results from last 7 days   Lab Units 10/09/19  0447 10/08/19  0538 10/07/19  0452 10/06/19  0856 10/04/19  0518 10/03/19  1532   BUN mg/dL 42* 40* 36* 26* 27* 28*   CREATININE mg/dL 1 78* 1 91* 1 83* 1 63* 1 56* 1 61*   EGFR ml/min/1 73sq m 28 26 27 31 33 32       Urinary retention  Assessment & Plan  · Chronic urinary retention with catheter  Was recently seen in the emergency department on 9/30/2019 where she was started on Ciprofloxacin  Urine culture at that time showed E coli  Completed 3 day course antibiotics  Hyperlipidemia  Assessment & Plan  · Hyperlipidemia  Continue Atorvastatin  History of cerebral aneurysm repair  Assessment & Plan  · History of cerebral aneurysm status post clipping in 1996  Subsequently has migraine headaches  Patient does follow up with [facility] Neurology and is maintained on Carbamazepine (as anti seizure) and Depakote (for mood stabilization)  · "bubble" sensation in the front of the head today, unclear etiology; neuro exam nonfocal   · STAT CT head 10/8 showed no acute changes  Headache appears resolved  · Check depakote and tegretol levels - Depakote level low, Tegretol level pending    Dementia ([facility] [address] )  Assessment & Plan  · Dementia without behavioral disturbance  Patient does have underlying depression and mood currently stable on Wellbutrin and Lorazepam   Also takes Depakote for mood stabilization          VTE Pharmacologic Prophylaxis:   Pharmacologic: Warfarin (Coumadin)  Mechanical VTE Prophylaxis in Place: Yes    Patient Centered Rounds: I have performed bedside rounds with nursing staff today  Discussions with Specialists or Other Care Team Provider: cardiology note reviewed; CM     Education and Discussions with Family / Patient: patient, daughter at bedside     Time Spent for Care: 20 minutes  More than 50% of total time spent on counseling and coordination of care as described above  Current Length of Stay: 6 day(s)    Current Patient Status: Inpatient   Certification Statement: The patient will continue to require additional inpatient hospital stay due to IV diuresis    Discharge Plan:  Not medically stable, anticipate at least another 48 hours; daughter we either plan to take her home if recommending rehab, return to prior living facility    Code Status: Level 1 - Full Code      Subjective:   Patient seen this afternoon, she is feeling better  The left prior IV site is now cleared, new IV is a little painful for her because of location, however is clear appearing  No longer having any headache or bubble sensation in the front of her head  No longer short of breath, however is fatigued with ambulation  Denies any cough chest pain  Objective:     Vitals:   Temp (24hrs), Av 9 °F (36 6 °C), Min:97 5 °F (36 4 °C), Max:98 1 °F (36 7 °C)    Temp:  [97 5 °F (36 4 °C)-98 1 °F (36 7 °C)] 98 °F (36 7 °C)  HR:  [101-118] 101  Resp:  [17-19] 18  BP: ()/() 128/74  SpO2:  [91 %-97 %] 96 %  Body mass index is 32 7 kg/m²  Input and Output Summary (last 24 hours): Intake/Output Summary (Last 24 hours) at 10/9/2019 1504  Last data filed at 10/9/2019 0900  Gross per 24 hour   Intake 600 ml   Output 200 ml   Net 400 ml       Physical Exam:     Physical Exam   Constitutional: She appears well-developed and well-nourished  No distress  Cardiovascular: S1 normal, S2 normal and normal heart sounds  An irregularly irregular rhythm present  Tachycardia present     Tachy improved   Pulmonary/Chest: Effort normal  No respiratory distress  She has no wheezes  She has rales (Fine, bilateral bases)  Abdominal: Soft  Bowel sounds are normal  She exhibits no distension  Genitourinary:   Genitourinary Comments: Davis catheter in place draining clear yellow urine   Musculoskeletal: She exhibits no edema  Neurological: She is alert  She is disoriented  Oriented to self, daughter, location; able to identify the present, however not able to identify why she is in the hospital   Nursing note and vitals reviewed  Additional Data:     Labs:    Results from last 7 days   Lab Units 10/09/19  0447 10/07/19  0452   WBC Thousand/uL 5 94 6 18   HEMOGLOBIN g/dL 12 9 12 3   HEMATOCRIT % 39 3 37 8   PLATELETS Thousands/uL 175 170   NEUTROS PCT %  --  58   LYMPHS PCT %  --  28   MONOS PCT %  --  9   EOS PCT %  --  4     Results from last 7 days   Lab Units 10/09/19  0447  10/04/19  0518   SODIUM mmol/L 142   < > 146*   POTASSIUM mmol/L 3 9   < > 4 0   CHLORIDE mmol/L 104   < > 109*   CO2 mmol/L 30   < > 28   BUN mg/dL 42*   < > 27*   CREATININE mg/dL 1 78*   < > 1 56*   ANION GAP mmol/L 8   < > 9   CALCIUM mg/dL 9 6   < > 9 0   ALBUMIN g/dL  --   --  3 1*   TOTAL BILIRUBIN mg/dL  --   --  0 40   ALK PHOS U/L  --   --  55   ALT U/L  --   --  30   AST U/L  --   --  29   GLUCOSE RANDOM mg/dL 106   < > 127    < > = values in this interval not displayed  Results from last 7 days   Lab Units 10/09/19  0709   INR  2 25*     Results from last 7 days   Lab Units 10/06/19  0627 10/05/19  1811   POC GLUCOSE mg/dl 102 130                   * I Have Reviewed All Lab Data Listed Above  * Additional Pertinent Lab Tests Reviewed:  All Labs Within Last 24 Hours Reviewed    Imaging:    Imaging Reports Reviewed Today Include: cath report pending   Imaging Personally Reviewed by Myself Includes:      Recent Cultures (last 7 days):           Last 24 Hours Medication List:     Current Facility-Administered Medications:  acetaminophen 650 mg Oral Q6H PRN [facility]   albuterol 2 puff Inhalation Q6H PRN [facility], DO   amiodarone 200 mg Oral Daily With Breakfast [facility], DO   atorvastatin 10 mg Oral Daily Paolo Braggng, DO   bumetanide 2 mg Intravenous BID [facility], DO   buPROPion 150 mg Oral Daily [facility], DO   butalbital-acetaminophen-caffeine 1 tablet Oral Q4H PRN [facility], DO   carBAMazepine 100 mg Oral TID [facility], DO   cholecalciferol 1,000 Units Oral Daily Paolo Cazares, DO   clopidogrel 75 mg Oral Daily Paolo Cazares, DO   diltiazem 10 mg Intravenous Q4H PRN [facility], DO   divalproex sodium 250 mg Oral BID Paolo Cazares, DO   ferrous sulfate 325 mg Oral BID [facility], DO   fluticasone-vilanterol 1 puff Inhalation Daily Paolo Cazares, DO   levothyroxine 88 mcg Oral Daily Paolo Cazares, DO   LORazepam 0 5 mg Oral BID Paolo Luong, DO   metolazone 5 mg Oral Daily [facility] Juan, DO   metoprolol tartrate 50 mg Oral Q12H [facility], DO   ondansetron 4 mg Intravenous Q4H PRN [facility], DO   potassium chloride 20 mEq Oral BID With Meals Azalia Rueda PA-C   saccharomyces boulardii 250 mg Oral BID Paolo Cazares, DO   senna-docusate sodium 1 tablet Oral Daily PRN [facility], DO   warfarin 3 mg Oral Daily (warfarin) Esther Escalona PA-C        Today, Patient Was Seen By: Esther Escalona PA-C    ** Please Note: Dictation voice to text software may have been used in the creation of this document   **

## 2019-10-09 NOTE — ASSESSMENT & PLAN NOTE
· CKD 3  Monitor Cr closely on IV diuretics, BUN/Cr up elevated  · TAM on CKD likely secondary IV diuresis as evidence by the climbing creatinine, will continue to monitor serial labs  · Creatinine slightly improved overnight continue to monitor closely on diuresis      Results from last 7 days   Lab Units 10/09/19  0447 10/08/19  0538 10/07/19  0452 10/06/19  0856 10/04/19  0518 10/03/19  1532   BUN mg/dL 42* 40* 36* 26* 27* 28*   CREATININE mg/dL 1 78* 1 91* 1 83* 1 63* 1 56* 1 61*   EGFR ml/min/1 73sq m 28 26 27 31 33 32

## 2019-10-09 NOTE — ASSESSMENT & PLAN NOTE
· Dementia without behavioral disturbance  Patient does have underlying depression and mood currently stable on Wellbutrin and Lorazepam   Also takes Depakote for mood stabilization

## 2019-10-09 NOTE — ASSESSMENT & PLAN NOTE
· History of cerebral aneurysm status post clipping in 1996  Subsequently has migraine headaches  Patient does follow up with [facility] and is maintained on Carbamazepine (as anti seizure) and Depakote (for mood stabilization)  · "bubble" sensation in the front of the head today, unclear etiology; neuro exam nonfocal   · STAT CT head 10/8 showed no acute changes  Headache appears resolved    · Check depakote and tegretol levels - Depakote level low, Tegretol level pending

## 2019-10-09 NOTE — ASSESSMENT & PLAN NOTE
Wt Readings from Last 3 Encounters:   10/09/19 81 1 kg (178 lb 12 7 oz)   09/30/19 88 3 kg (194 lb 10 7 oz)   07/18/19 83 5 kg (184 lb)     · Acute on chronic diastolic congestive heart failure on admission  Patient has had approximately 10 lb weight gain by report  · Echo shows an EF of 45%, study was technically difficult to evaluate for diastolic dysfunction  · CXR showing cardiomegaly  Pro-BNP elevated 12k  Troponin <0 02   · IV Lasix is no longer effective, switched IV Bumex and per the daughter she has had significant output since the Bumex was given about 2 hours prior to my conversation with her  · Cardiology input appreciated, right heart catheterization conducted yesterday  · Daily weights, strict I/Os, low sodium diet  · Monitor electrolytes: keep K+ >4 and Mg >2

## 2019-10-09 NOTE — ASSESSMENT & PLAN NOTE
· Atrial fibrillation/flutter with RVR requiring IV Diltiazem in the ED  · Cardiology input appreciated  On Metoprolol and transitioned from amiodarone drip to oral amiodarone  · Continue Telemetry  Continue Coumadin, resume 3 mg at night

## 2019-10-09 NOTE — PLAN OF CARE
Problem: Potential for Falls  Goal: Patient will remain free of falls  Description  INTERVENTIONS:  - Assess patient frequently for physical needs  -  Identify cognitive and physical deficits and behaviors that affect risk of falls  -  [place] fall precautions as indicated by assessment   - Educate patient/family on patient safety including physical limitations  - Instruct patient to call for assistance with activity based on assessment  - Modify environment to reduce risk of injury  - Consider OT/PT consult to assist with strengthening/mobility  Outcome: Progressing     Problem: Prexisting or High Potential for Compromised Skin Integrity  Goal: Skin integrity is maintained or improved  Description  INTERVENTIONS:  - Identify patients at risk for skin breakdown  - Assess and monitor skin integrity  - Assess and monitor nutrition and hydration status  - Monitor labs   - Assess for incontinence   - Turn and reposition patient  - Assist with mobility/ambulation  - Relieve pressure over bony prominences  - Avoid friction and shearing  - Provide appropriate hygiene as needed including keeping skin clean and dry  - Evaluate need for skin moisturizer/barrier cream  - Collaborate with interdisciplinary team   - Patient/family teaching  - Consider wound care consult   Outcome: Progressing     Problem: SAFETY ADULT  Goal: Patient will remain free of falls  Description  INTERVENTIONS:  - Assess patient frequently for physical needs  -  Identify cognitive and physical deficits and behaviors that affect risk of falls    -  [place] fall precautions as indicated by assessment   - Educate patient/family on patient safety including physical limitations  - Instruct patient to call for assistance with activity based on assessment  - Modify environment to reduce risk of injury  - Consider OT/PT consult to assist with strengthening/mobility  Outcome: Progressing  Goal: Maintain or return to baseline ADL function  Description  INTERVENTIONS:  -  Assess patient's ability to carry out ADLs; assess patient's baseline for ADL function and identify physical deficits which impact ability to perform ADLs (bathing, care of mouth/teeth, toileting, grooming, dressing, etc )  - Assess/evaluate cause of self-care deficits   - Assess range of motion  - Assess patient's mobility; develop plan if impaired  - Assess patient's need for assistive devices and provide as appropriate  - Encourage maximum independence but intervene and supervise when necessary  - Involve family in performance of ADLs  - Assess for home care needs following discharge   - Consider OT consult to assist with ADL evaluation and planning for discharge  - Provide patient education as appropriate  Outcome: Progressing  Goal: Maintain or return mobility status to optimal level  Description  INTERVENTIONS:  - Assess patient's baseline mobility status (ambulation, transfers, stairs, etc )    - Identify cognitive and physical deficits and behaviors that affect mobility  - Identify mobility aids required to assist with transfers and/or ambulation (gait belt, sit-to-stand, lift, walker, cane, etc )  - [place] fall precautions as indicated by assessment  - Record patient progress and toleration of activity level on Mobility SBAR; progress patient to next Phase/Stage  - Instruct patient to call for assistance with activity based on assessment  - Consider rehabilitation consult to assist with strengthening/weightbearing, etc   Outcome: Progressing     Problem: DISCHARGE PLANNING  Goal: Discharge to home or other facility with appropriate resources  Description  INTERVENTIONS:  - Identify barriers to discharge w/patient and caregiver  - Arrange for needed discharge resources and transportation as appropriate  - Identify discharge learning needs (meds, wound care, etc )  - Arrange for interpretive services to assist at discharge as needed  - Refer to Case Management Department for coordinating discharge planning if the patient needs post-hospital services based on physician/advanced practitioner order or complex needs related to functional status, cognitive ability, or social support system  Outcome: Progressing     Problem: Knowledge Deficit  Goal: Patient/family/caregiver demonstrates understanding of disease process, treatment plan, medications, and discharge instructions  Description  Complete learning assessment and assess knowledge base    Interventions:  - Provide teaching at level of understanding  - Provide teaching via preferred learning methods  Outcome: Progressing     Problem: CARDIOVASCULAR - ADULT  Goal: Maintains optimal cardiac output and hemodynamic stability  Description  INTERVENTIONS:  - Monitor I/O, vital signs and rhythm  - Monitor for S/S and trends of decreased cardiac output  - Administer and titrate ordered vasoactive medications to optimize hemodynamic stability  - Assess quality of pulses, skin color and temperature  - Assess for signs of decreased coronary artery perfusion  - Instruct patient to report change in severity of symptoms  Outcome: Progressing  Goal: Absence of cardiac dysrhythmias or at baseline rhythm  Description  INTERVENTIONS:  - Continuous cardiac monitoring, vital signs, obtain 12 lead EKG if ordered  - Administer antiarrhythmic and heart rate control medications as ordered  - Monitor electrolytes and administer replacement therapy as ordered  Outcome: Progressing     Problem: GENITOURINARY - ADULT  Goal: Maintains or returns to baseline urinary function  Description  INTERVENTIONS:  - Assess urinary function  - Encourage oral fluids to ensure adequate hydration if ordered  - Administer IV fluids as ordered to ensure adequate hydration  - Administer ordered medications as needed  - Offer frequent toileting  - Follow urinary retention protocol if ordered  Outcome: Progressing  Goal: Absence of urinary retention  Description  INTERVENTIONS:  - Assess patients ability to void and empty bladder  - Monitor I/O  - Bladder scan as needed  - Discuss with physician/AP medications to alleviate retention as needed  - Discuss catheterization for long term situations as appropriate  Outcome: Progressing  Goal: Urinary catheter remains patent  Description  INTERVENTIONS:  - Assess patency of urinary catheter  - If patient has a chronic baca, consider changing catheter if non-functioning  - Follow guidelines for intermittent irrigation of non-functioning urinary catheter  Outcome: Progressing     Problem: MUSCULOSKELETAL - ADULT  Goal: Maintain or return mobility to safest level of function  Description  INTERVENTIONS:  - Assess patient's ability to carry out ADLs; assess patient's baseline for ADL function and identify physical deficits which impact ability to perform ADLs (bathing, care of mouth/teeth, toileting, grooming, dressing, etc )  - Assess/evaluate cause of self-care deficits   - Assess range of motion  - Assess patient's mobility  - Assess patient's need for assistive devices and provide as appropriate  - Encourage maximum independence but intervene and supervise when necessary  - Involve family in performance of ADLs  - Assess for home care needs following discharge   - Consider OT consult to assist with ADL evaluation and planning for discharge  - Provide patient education as appropriate  Outcome: Progressing

## 2019-10-10 LAB
CARBAMAZEPINE FREE SERPL-MCNC: 1.4 UG/ML (ref 0.6–4.2)
INR PPP: 2.3 (ref 0.84–1.19)
PROTHROMBIN TIME: 25.6 SECONDS (ref 11.6–14.5)

## 2019-10-10 PROCEDURE — 85610 PROTHROMBIN TIME: CPT | Performed by: PHYSICIAN ASSISTANT

## 2019-10-10 PROCEDURE — 99231 SBSQ HOSP IP/OBS SF/LOW 25: CPT | Performed by: INTERNAL MEDICINE

## 2019-10-10 PROCEDURE — 99232 SBSQ HOSP IP/OBS MODERATE 35: CPT | Performed by: PHYSICIAN ASSISTANT

## 2019-10-10 RX ORDER — BUMETANIDE 0.25 MG/ML
1 INJECTION INTRAMUSCULAR; INTRAVENOUS CONTINUOUS
Status: DISCONTINUED | OUTPATIENT
Start: 2019-10-10 | End: 2019-10-11

## 2019-10-10 RX ADMIN — CARBAMAZEPINE 100 MG: 100 TABLET, CHEWABLE ORAL at 20:57

## 2019-10-10 RX ADMIN — METOPROLOL TARTRATE 50 MG: 50 TABLET, FILM COATED ORAL at 20:57

## 2019-10-10 RX ADMIN — FLUTICASONE FUROATE AND VILANTEROL TRIFENATATE 1 PUFF: 100; 25 POWDER RESPIRATORY (INHALATION) at 09:35

## 2019-10-10 RX ADMIN — DIVALPROEX SODIUM 250 MG: 250 TABLET, FILM COATED, EXTENDED RELEASE ORAL at 17:28

## 2019-10-10 RX ADMIN — VITAMIN D, TAB 1000IU (100/BT) 1000 UNITS: 25 TAB at 09:31

## 2019-10-10 RX ADMIN — POTASSIUM CHLORIDE 20 MEQ: 1500 TABLET, EXTENDED RELEASE ORAL at 17:28

## 2019-10-10 RX ADMIN — AMIODARONE HYDROCHLORIDE 200 MG: 200 TABLET ORAL at 09:27

## 2019-10-10 RX ADMIN — BUMETANIDE 2 MG: 0.25 INJECTION INTRAMUSCULAR; INTRAVENOUS at 09:48

## 2019-10-10 RX ADMIN — WARFARIN SODIUM 3 MG: 3 TABLET ORAL at 17:27

## 2019-10-10 RX ADMIN — Medication 250 MG: at 09:39

## 2019-10-10 RX ADMIN — CARBAMAZEPINE 100 MG: 100 TABLET, CHEWABLE ORAL at 09:31

## 2019-10-10 RX ADMIN — ATORVASTATIN CALCIUM 10 MG: 10 TABLET, FILM COATED ORAL at 09:28

## 2019-10-10 RX ADMIN — LORAZEPAM 0.5 MG: 0.5 TABLET ORAL at 17:27

## 2019-10-10 RX ADMIN — Medication 250 MG: at 17:27

## 2019-10-10 RX ADMIN — METOLAZONE 5 MG: 5 TABLET ORAL at 09:36

## 2019-10-10 RX ADMIN — LEVOTHYROXINE SODIUM 88 MCG: 88 TABLET ORAL at 06:53

## 2019-10-10 RX ADMIN — ACETAMINOPHEN 650 MG: 325 TABLET, FILM COATED ORAL at 11:36

## 2019-10-10 RX ADMIN — LORAZEPAM 0.5 MG: 0.5 TABLET ORAL at 09:35

## 2019-10-10 RX ADMIN — DIVALPROEX SODIUM 250 MG: 250 TABLET, FILM COATED, EXTENDED RELEASE ORAL at 09:33

## 2019-10-10 RX ADMIN — Medication 1 MG/HR: at 21:27

## 2019-10-10 RX ADMIN — FERROUS SULFATE TAB 325 MG (65 MG ELEMENTAL FE) 325 MG: 325 (65 FE) TAB at 17:28

## 2019-10-10 RX ADMIN — BUPROPION HYDROCHLORIDE 150 MG: 150 TABLET, FILM COATED, EXTENDED RELEASE ORAL at 09:30

## 2019-10-10 RX ADMIN — FERROUS SULFATE TAB 325 MG (65 MG ELEMENTAL FE) 325 MG: 325 (65 FE) TAB at 09:34

## 2019-10-10 RX ADMIN — Medication 1 MG/HR: at 11:40

## 2019-10-10 RX ADMIN — CARBAMAZEPINE 100 MG: 100 TABLET, CHEWABLE ORAL at 17:27

## 2019-10-10 RX ADMIN — CLOPIDOGREL BISULFATE 75 MG: 75 TABLET ORAL at 09:32

## 2019-10-10 RX ADMIN — POTASSIUM CHLORIDE 20 MEQ: 1500 TABLET, EXTENDED RELEASE ORAL at 09:38

## 2019-10-10 RX ADMIN — METOPROLOL TARTRATE 50 MG: 50 TABLET, FILM COATED ORAL at 09:37

## 2019-10-10 NOTE — PHYSICAL THERAPY NOTE
PHYSICAL THERAPY NOTE          Patient Name: Sisi Martins  CEHTJ'Q Date: 10/10/2019       This PT spoke with SHABANA Alejandra regarding prioritization for therapy for this patient  SHABANA Alejandra verbalized that patient can be seen as a priority for physical therapy on 10/11/2019  PT will continue to follow patient and provide PT interventions as appropriate       Nik Molina PT

## 2019-10-10 NOTE — ASSESSMENT & PLAN NOTE
· History of cerebral aneurysm status post clipping in 1996  Subsequently has migraine headaches  Patient does follow up with [facility] and is maintained on Carbamazepine (as anti seizure) and Depakote (for mood stabilization)  · "bubble" sensation in the front of the head today, unclear etiology; neuro exam nonfocal   · STAT CT head 10/8 showed no acute changes  Headache appears resolved    · Depakote level low, Tegretol level normal

## 2019-10-10 NOTE — ASSESSMENT & PLAN NOTE
· Atrial fibrillation/flutter with RVR requiring IV Diltiazem in the ED  · Cardiology input appreciated  On Metoprolol and transitioned from amiodarone drip to oral amiodarone  · Continue Telemetry  Continue Coumadin 3 mg at night

## 2019-10-10 NOTE — ASSESSMENT & PLAN NOTE
· CKD 3  Monitor Cr closely on IV diuretics, BUN/Cr up elevated  · TAM on CKD likely cardiorenal syndrome, will continue to monitor serial labs      Results from last 7 days   Lab Units 10/09/19  0447 10/08/19  0538 10/07/19  0452 10/06/19  0856 10/04/19  0518 10/03/19  1532   BUN mg/dL 42* 40* 36* 26* 27* 28*   CREATININE mg/dL 1 78* 1 91* 1 83* 1 63* 1 56* 1 61*   EGFR ml/min/1 73sq m 28 26 27 31 33 32

## 2019-10-10 NOTE — PROGRESS NOTES
Cardiology Progress Note    Assessment/Plan   Atrial flutter with rapid ventricular response  Possible systolic cardiomyopathy on echocardiogram (unclear based on rate)  HFrEF, decompensated  PAD s/p complex LE revascularization and covered stent placement after stent fracture  Hx of CAD s/p prior PCI  Dementia  Acute on CKD3 - cardiorenal syndrome      Start bumex gtt  Daily weight are a crucial part of her treatment  Encourage ambulation  Involve PT/OT if not already  Monitor renal function  Keep K > 4, Mg > 2, Ca > 8 and PO4 > 2 5  Will follow  LOS: 7 days     Subjective   No acute events  Diuretics are still not effective  Objective     Vital signs in last 24 hours:  Temp:  [97 5 °F (36 4 °C)-98 2 °F (36 8 °C)] 98 °F (36 7 °C)  HR:  [101-115] 115  Resp:  [18-19] 18  BP: (107-135)/(67-82) 121/72      Intake/Output Summary (Last 24 hours) at 10/10/2019 1041  Last data filed at 10/9/2019 1900  Gross per 24 hour   Intake 580 ml   Output 1000 ml   Net -420 ml     Weight (last 2 days)     Date/Time   Weight    10/10/19 0300   80 1 (176 59)    10/09/19 0600   81 1 (178 79)    10/08/19 0547   81 1 (178 79)                Physical Exam:  General:         AAOx3, NAD  HEENT:           Normocephalic/Atraumatic, No thyromegaly, lymphadenopathy or carotid bruits   JVD is present  Cardiovasc:     Regularly irregular rhythm with a normal rate   No murmurs, rubs or gallops   Radial, carotid pulses are 2+/4  Chest/Pulm:    Basilar rales persist   Abdomen:        +BSx4, Soft, non-tender   No organomegaly, rebound, rigidity or guarding  Extremities:     No edema          Scheduled Meds:  Current Facility-Administered Medications:  acetaminophen 650 mg Oral Q6H PRN Ezra Bilis, DO   albuterol 2 puff Inhalation Q6H PRN Ezra Bilis, DO   amiodarone 200 mg Oral Daily With Breakfast [facility], DO   atorvastatin 10 mg Oral Daily Paolo Cazares, DO   bumetanide 2 mg Intravenous BID [facility], DO buPROPion 150 mg Oral Daily Vanessa Sago, DO   butalbital-acetaminophen-caffeine 1 tablet Oral Q4H PRN Vanessa [facility], DO   carBAMazepine 100 mg Oral TID Vanessa Sago, DO   cholecalciferol 1,000 Units Oral Daily [facility], DO   clopidogrel 75 mg Oral Daily [facility], DO   diltiazem 10 mg Intravenous Q4H PRN Vanessa [facility], DO   divalproex sodium 250 mg Oral BID [facility], DO   ferrous sulfate 325 mg Oral BID Vanessa [facility], DO   fluticasone-vilanterol 1 puff Inhalation Daily [facility], DO   levothyroxine 88 mcg Oral Daily [facility], DO   LORazepam 0 5 mg Oral BID [facility], DO   metolazone 5 mg Oral Daily Isidro Palmer, DO   metoprolol tartrate 50 mg Oral Q12H [address] Isidro Palmer, DO   ondansetron 4 mg Intravenous Q4H PRN [facility], DO   potassium chloride 20 mEq Oral BID With Meals Azalia Rueda PA-C   saccharomyces boulardii 250 mg Oral BID Vanessa Sag, DO   senna-docusate sodium 1 tablet Oral Daily [facility]   warfarin 3 mg Oral Daily (warfarin) Azalia Rueda PA-C     Continuous Infusions:   PRN Meds:   acetaminophen    albuterol    butalbital-acetaminophen-caffeine    diltiazem    ondansetron    senna-docusate sodium          Lab Review   Results for Francesca Alfonso (MRN [id number]) as of 10/10/2019 10:40   10/10/2019 04:33   Protime 25 6 (H)   INR 2 30 (H)

## 2019-10-10 NOTE — ASSESSMENT & PLAN NOTE
· Peripheral artery disease status post lower extremity stenting  No evidence of claudication at this time  · Continue Clopidogrel and Atorvastatin  · Patient was due to have a follow-up ultrasound to ensure stents are patent on Friday, she most will not be discharged from the hospital in time for this, the daughter will cancel- will need to be rescheduled outpatient

## 2019-10-10 NOTE — PROGRESS NOTES
Progress Note Merlin Hasasn 1945, 69 y o  female MRN: [id number]    Unit/Bed#: -01 Encounter: [id number]    Primary Care Provider: Cherylene Hamilton, DO   Date and time admitted to hospital: 10/3/2019  3:21 PM        * Acute on chronic diastolic congestive heart failure [facility])  Assessment & Plan  Wt Readings from Last 3 Encounters:   10/10/19 80 1 kg (176 lb 9 4 oz)   09/30/19 88 3 kg (194 lb 10 7 oz)   07/18/19 83 5 kg (184 lb)     · Acute on chronic diastolic congestive heart failure on admission  Patient has had approximately 10 lb weight gain by report  · Echo shows an EF of 45%, study was technically difficult to evaluate for diastolic dysfunction  · CXR showing cardiomegaly  Pro-BNP elevated 12k  Troponin <0 02   · IV Lasix was no longer effective, switched to IV Bumex still with inadequate output  Start Bumex gtt today per Cardiology  · Cardiology input appreciated, right heart catheterization 10/8  · Daily weights, strict I/Os, low sodium diet  · Monitor electrolytes: keep K+ >4 and Mg >2  Atrial flutter (HCC)  Assessment & Plan  · Atrial fibrillation/flutter with RVR requiring IV Diltiazem in the ED  · Cardiology input appreciated  On Metoprolol and transitioned from amiodarone drip to oral amiodarone  · Continue Telemetry  Continue Coumadin 3 mg at night  Urinary retention  Assessment & Plan  · Chronic urinary retention with catheter  Was recently seen in the emergency department on 9/30/2019 where she was started on Ciprofloxacin  Urine culture at that time showed E coli  Completed 3 day course antibiotics  History of cerebral aneurysm repair  Assessment & Plan  · History of cerebral aneurysm status post clipping in 1996  Subsequently has migraine headaches  Patient does follow up with [facility] and is maintained on Carbamazepine (as anti seizure) and Depakote (for mood stabilization)    · "bubble" sensation in the front of the head today, unclear etiology; neuro exam nonfocal   · STAT CT head 10/8 showed no acute changes  Headache appears resolved  · Depakote level low, Tegretol level normal     PAD (peripheral artery disease) ([facility])  Assessment & Plan  · Peripheral artery disease status post lower extremity stenting  No evidence of claudication at this time  · Continue Clopidogrel and Atorvastatin  · Patient was due to have a follow-up ultrasound to ensure stents are patent on Friday, she most will not be discharged from the hospital in time for this, the daughter will cancel- will need to be rescheduled outpatient  Hypothyroidism  Assessment & Plan  · Hypothyroidism, continue Levothyroxine, normal TSH  Essential hypertension  Assessment & Plan  · Essential hypertension, continue metoprolol  Controlled  Stage 3 chronic kidney disease (HCC)  Assessment & Plan  · CKD 3  Monitor Cr closely on IV diuretics, BUN/Cr up elevated  · TAM on CKD likely cardiorenal syndrome, will continue to monitor serial labs  Results from last 7 days   Lab Units 10/09/19  0447 10/08/19  0538 10/07/19  0452 10/06/19  0856 10/04/19  0518 10/03/19  1532   BUN mg/dL 42* 40* 36* 26* 27* 28*   CREATININE mg/dL 1 78* 1 91* 1 83* 1 63* 1 56* 1 61*   EGFR ml/min/1 73sq m 28 26 27 31 33 32       Hyperlipidemia  Assessment & Plan  · Hyperlipidemia  Continue Atorvastatin  Dementia [facility])  Assessment & Plan  · Dementia without behavioral disturbance  Patient does have underlying depression and mood currently stable on Wellbutrin and Lorazepam   Also takes Depakote for mood stabilization  VTE Pharmacologic Prophylaxis:   Pharmacologic: Warfarin (Coumadin)  Mechanical VTE Prophylaxis in Place: Yes    Patient Centered Rounds: I have performed bedside rounds with nursing staff today  Discussions with Specialists or Other Care Team Provider: [address] Cardiology input  Education and Discussions with Family / Patient: Discussed with daughter by phone      Time Spent for Care: 30 minutes  More than 50% of total time spent on counseling and coordination of care as described above  Current Length of Stay: 7 day(s)    Current Patient Status: Inpatient   Certification Statement: The patient will continue to require additional inpatient hospital stay due to treatment of CHF requiring Bumex infusion  Discharge Plan: Not medically cleared  Code Status: Level 1 - Full Code      Subjective:   Patient reports SOB is better then when she came in  No CP  No abdominal pain  No N/V  She is still not having good output  Objective:     Vitals:   Temp (24hrs), Av 8 °F (36 6 °C), Min:97 °F (36 1 °C), Max:98 2 °F (36 8 °C)    Temp:  [97 °F (36 1 °C)-98 2 °F (36 8 °C)] 97 °F (36 1 °C)  HR:  [101-115] 113  Resp:  [18-19] 18  BP: (107-135)/(67-82) 122/82  SpO2:  [92 %-96 %] 94 %  Body mass index is 32 3 kg/m²  Input and Output Summary (last 24 hours): Intake/Output Summary (Last 24 hours) at 10/10/2019 1438  Last data filed at 10/10/2019 0937  Gross per 24 hour   Intake 340 ml   Output 1000 ml   Net -660 ml       Physical Exam:     Physical Exam   Constitutional: No distress  HENT:   Head: Normocephalic and atraumatic  Cardiovascular:   +S1S2, irregularly irregular  Pulmonary/Chest: Effort normal and breath sounds normal  No respiratory distress  She has no wheezes  Fine rales at bases  Abdominal: Soft  Bowel sounds are normal  She exhibits no distension  There is no tenderness  Musculoskeletal: She exhibits no edema  Neurological: She is alert  Skin: She is not diaphoretic  Vitals reviewed           Additional Data:     Labs:    Results from last 7 days   Lab Units 10/09/19  0447 10/07/19  0452   WBC Thousand/uL 5 94 6 18   HEMOGLOBIN g/dL 12 9 12 3   HEMATOCRIT % 39 3 37 8   PLATELETS Thousands/uL 175 170   NEUTROS PCT %  --  58   LYMPHS PCT %  --  28   MONOS PCT %  --  9   EOS PCT %  --  4     Results from last 7 days   Lab Units 10/09/19  0447 10/04/19  0518   POTASSIUM mmol/L 3 9   < > 4 0   CHLORIDE mmol/L 104   < > 109*   CO2 mmol/L 30   < > 28   BUN mg/dL 42*   < > 27*   CREATININE mg/dL 1 78*   < > 1 56*   CALCIUM mg/dL 9 6   < > 9 0   ALK PHOS U/L  --   --  55   ALT U/L  --   --  30   AST U/L  --   --  29    < > = values in this interval not displayed  Results from last 7 days   Lab Units 10/10/19  0433   INR  2 30*       * I Have Reviewed All Lab Data Listed Above  * Additional Pertinent Lab Tests Reviewed: All Labs Within Last 24 Hours Reviewed    Imaging:    Imaging Reports Reviewed Today Include: No new imaging      Recent Cultures (last 7 days):           Last 24 Hours Medication List:     Current Facility-Administered Medications:  acetaminophen 650 mg Oral Q6H PRN Carlos Enrique Yann, DO    albuterol 2 puff Inhalation Q6H PRN Carlos Enrique Yann, DO    amiodarone 200 mg Oral Daily With Breakfast Nimco Bucco, DO    atorvastatin 10 mg Oral Daily Carlos Enrique Yann, DO    bumetanide 1 mg/hr Intravenous Continuous Nimco Bucco, DO Last Rate: 1 mg/hr (10/10/19 1140)   buPROPion 150 mg Oral Daily Carlos Enrique Alma, DO    butalbital-acetaminophen-caffeine 1 tablet Oral Q4H PRN Carlos Enrique Yann, DO    carBAMazepine 100 mg Oral TID Carlos Enrique Yann, DO    cholecalciferol 1,000 Units Oral Daily Paolo Sage, DO    clopidogrel 75 mg Oral Daily Paolo Sage, DO    diltiazem 10 mg Intravenous Q4H PRN Carlos Enrique Alma, DO    divalproex sodium 250 mg Oral BID Paolo Sage, DO    ferrous sulfate 325 mg Oral BID Carlos Enrique Alma, DO    fluticasone-vilanterol 1 puff Inhalation Daily Paolo Sage, DO    levothyroxine 88 mcg Oral Daily Paolo Sage, DO    LORazepam 0 5 mg Oral BID Paolo Sage, DO    metolazone 5 mg Oral Daily Nimco Bucco, DO    metoprolol tartrate 50 mg Oral Q12H [address] Nimco Bucco, DO    ondansetron 4 mg Intravenous Q4H PRN Carlos Enrique Alma, DO    potassium chloride 20 mEq Oral BID With Meals FELIZ Nevarezdii 250 mg Oral BID Uday Cornejo,     senna-docusate sodium 1 tablet Oral Daily PRN Uday Cornejo, DO    warfarin 3 mg Oral Daily (warfarin) Esther Escalona PA-C         Today, Patient Was Seen By: Danny Shane PA-C    ** Please Note: Dictation voice to text software may have been used in the creation of this document   **

## 2019-10-10 NOTE — ASSESSMENT & PLAN NOTE
Wt Readings from Last 3 Encounters:   10/10/19 80 1 kg (176 lb 9 4 oz)   09/30/19 88 3 kg (194 lb 10 7 oz)   07/18/19 83 5 kg (184 lb)     · Acute on chronic diastolic congestive heart failure on admission  Patient has had approximately 10 lb weight gain by report  · Echo shows an EF of 45%, study was technically difficult to evaluate for diastolic dysfunction  · CXR showing cardiomegaly  Pro-BNP elevated 12k  Troponin <0 02   · IV Lasix was no longer effective, switched to IV Bumex still with inadequate output  Start Bumex gtt today per Cardiology  · Cardiology input appreciated, right heart catheterization 10/8  · Daily weights, strict I/Os, low sodium diet  · Monitor electrolytes: keep K+ >4 and Mg >2

## 2019-10-11 PROBLEM — R79.89 AZOTEMIA: Status: ACTIVE | Noted: 2019-10-11

## 2019-10-11 PROBLEM — N17.9 AKI (ACUTE KIDNEY INJURY) (HCC): Status: ACTIVE | Noted: 2019-10-11

## 2019-10-11 PROBLEM — I12.9 HYPERTENSIVE KIDNEY DISEASE WITH STAGE 3 CHRONIC KIDNEY DISEASE (HCC): Status: ACTIVE | Noted: 2019-10-11

## 2019-10-11 PROBLEM — N18.30 HYPERTENSIVE KIDNEY DISEASE WITH STAGE 3 CHRONIC KIDNEY DISEASE (HCC): Status: ACTIVE | Noted: 2019-10-11

## 2019-10-11 LAB
ANION GAP SERPL CALCULATED.3IONS-SCNC: 11 MMOL/L (ref 4–13)
BASOPHILS # BLD AUTO: 0.03 THOUSANDS/ΜL (ref 0–0.1)
BASOPHILS NFR BLD AUTO: 1 % (ref 0–1)
BUN SERPL-MCNC: 57 MG/DL (ref 5–25)
CALCIUM SERPL-MCNC: 9.8 MG/DL (ref 8.3–10.1)
CHLORIDE SERPL-SCNC: 100 MMOL/L (ref 100–108)
CO2 SERPL-SCNC: 34 MMOL/L (ref 21–32)
CREAT SERPL-MCNC: 2.29 MG/DL (ref 0.6–1.3)
CREAT UR-MCNC: 33 MG/DL
EOSINOPHIL # BLD AUTO: 0.29 THOUSAND/ΜL (ref 0–0.61)
EOSINOPHIL NFR BLD AUTO: 5 % (ref 0–6)
ERYTHROCYTE [DISTWIDTH] IN BLOOD BY AUTOMATED COUNT: 12.5 % (ref 11.6–15.1)
GFR SERPL CREATININE-BSD FRML MDRD: 21 ML/MIN/1.73SQ M
GLUCOSE SERPL-MCNC: 107 MG/DL (ref 65–140)
HCT VFR BLD AUTO: 41.2 % (ref 34.8–46.1)
HGB BLD-MCNC: 13.7 G/DL (ref 11.5–15.4)
IMM GRANULOCYTES # BLD AUTO: 0.02 THOUSAND/UL (ref 0–0.2)
IMM GRANULOCYTES NFR BLD AUTO: 0 % (ref 0–2)
INR PPP: 2.41 (ref 0.84–1.19)
LYMPHOCYTES # BLD AUTO: 1.88 THOUSANDS/ΜL (ref 0.6–4.47)
LYMPHOCYTES NFR BLD AUTO: 30 % (ref 14–44)
MAGNESIUM SERPL-MCNC: 2.3 MG/DL (ref 1.6–2.6)
MCH RBC QN AUTO: 33.8 PG (ref 26.8–34.3)
MCHC RBC AUTO-ENTMCNC: 33.3 G/DL (ref 31.4–37.4)
MCV RBC AUTO: 102 FL (ref 82–98)
MICROALBUMIN UR-MCNC: 42.9 MG/L (ref 0–20)
MICROALBUMIN/CREAT 24H UR: 130 MG/G CREATININE (ref 0–30)
MONOCYTES # BLD AUTO: 0.66 THOUSAND/ΜL (ref 0.17–1.22)
MONOCYTES NFR BLD AUTO: 10 % (ref 4–12)
NEUTROPHILS # BLD AUTO: 3.49 THOUSANDS/ΜL (ref 1.85–7.62)
NEUTS SEG NFR BLD AUTO: 54 % (ref 43–75)
NRBC BLD AUTO-RTO: 0 /100 WBCS
PLATELET # BLD AUTO: 179 THOUSANDS/UL (ref 149–390)
PMV BLD AUTO: 12.2 FL (ref 8.9–12.7)
POTASSIUM SERPL-SCNC: 3 MMOL/L (ref 3.5–5.3)
PROTHROMBIN TIME: 26.6 SECONDS (ref 11.6–14.5)
RBC # BLD AUTO: 4.05 MILLION/UL (ref 3.81–5.12)
SODIUM 24H UR-SCNC: 99 MOL/L
SODIUM SERPL-SCNC: 145 MMOL/L (ref 136–145)
UUN 24H UR-MCNC: 192 MG/DL
WBC # BLD AUTO: 6.37 THOUSAND/UL (ref 4.31–10.16)

## 2019-10-11 PROCEDURE — 82043 UR ALBUMIN QUANTITATIVE: CPT | Performed by: INTERNAL MEDICINE

## 2019-10-11 PROCEDURE — 99223 1ST HOSP IP/OBS HIGH 75: CPT | Performed by: INTERNAL MEDICINE

## 2019-10-11 PROCEDURE — 97110 THERAPEUTIC EXERCISES: CPT

## 2019-10-11 PROCEDURE — 84300 ASSAY OF URINE SODIUM: CPT | Performed by: INTERNAL MEDICINE

## 2019-10-11 PROCEDURE — 99232 SBSQ HOSP IP/OBS MODERATE 35: CPT | Performed by: INTERNAL MEDICINE

## 2019-10-11 PROCEDURE — 85025 COMPLETE CBC W/AUTO DIFF WBC: CPT | Performed by: PHYSICIAN ASSISTANT

## 2019-10-11 PROCEDURE — 84540 ASSAY OF URINE/UREA-N: CPT | Performed by: INTERNAL MEDICINE

## 2019-10-11 PROCEDURE — 97116 GAIT TRAINING THERAPY: CPT

## 2019-10-11 PROCEDURE — 85610 PROTHROMBIN TIME: CPT | Performed by: PHYSICIAN ASSISTANT

## 2019-10-11 PROCEDURE — 83735 ASSAY OF MAGNESIUM: CPT | Performed by: PHYSICIAN ASSISTANT

## 2019-10-11 PROCEDURE — 80048 BASIC METABOLIC PNL TOTAL CA: CPT | Performed by: PHYSICIAN ASSISTANT

## 2019-10-11 PROCEDURE — 99232 SBSQ HOSP IP/OBS MODERATE 35: CPT | Performed by: PHYSICIAN ASSISTANT

## 2019-10-11 PROCEDURE — 82570 ASSAY OF URINE CREATININE: CPT | Performed by: INTERNAL MEDICINE

## 2019-10-11 RX ORDER — POTASSIUM CHLORIDE 20MEQ/15ML
40 LIQUID (ML) ORAL ONCE
Status: COMPLETED | OUTPATIENT
Start: 2019-10-11 | End: 2019-10-11

## 2019-10-11 RX ADMIN — METOPROLOL TARTRATE 50 MG: 50 TABLET, FILM COATED ORAL at 21:53

## 2019-10-11 RX ADMIN — DIVALPROEX SODIUM 250 MG: 250 TABLET, FILM COATED, EXTENDED RELEASE ORAL at 17:31

## 2019-10-11 RX ADMIN — FLUTICASONE FUROATE AND VILANTEROL TRIFENATATE 1 PUFF: 100; 25 POWDER RESPIRATORY (INHALATION) at 08:48

## 2019-10-11 RX ADMIN — CARBAMAZEPINE 100 MG: 100 TABLET, CHEWABLE ORAL at 21:53

## 2019-10-11 RX ADMIN — WARFARIN SODIUM 3 MG: 3 TABLET ORAL at 17:31

## 2019-10-11 RX ADMIN — LORAZEPAM 0.5 MG: 0.5 TABLET ORAL at 08:47

## 2019-10-11 RX ADMIN — METOPROLOL TARTRATE 50 MG: 50 TABLET, FILM COATED ORAL at 08:47

## 2019-10-11 RX ADMIN — Medication 1 SPRAY: at 17:32

## 2019-10-11 RX ADMIN — LORAZEPAM 0.5 MG: 0.5 TABLET ORAL at 17:31

## 2019-10-11 RX ADMIN — CARBAMAZEPINE 100 MG: 100 TABLET, CHEWABLE ORAL at 08:48

## 2019-10-11 RX ADMIN — FERROUS SULFATE TAB 325 MG (65 MG ELEMENTAL FE) 325 MG: 325 (65 FE) TAB at 17:31

## 2019-10-11 RX ADMIN — POTASSIUM CHLORIDE 40 MEQ: 20 SOLUTION ORAL at 13:50

## 2019-10-11 RX ADMIN — POTASSIUM CHLORIDE 20 MEQ: 1500 TABLET, EXTENDED RELEASE ORAL at 16:30

## 2019-10-11 RX ADMIN — METOLAZONE 5 MG: 5 TABLET ORAL at 08:48

## 2019-10-11 RX ADMIN — Medication 250 MG: at 08:48

## 2019-10-11 RX ADMIN — POTASSIUM CHLORIDE 20 MEQ: 1500 TABLET, EXTENDED RELEASE ORAL at 07:30

## 2019-10-11 RX ADMIN — ATORVASTATIN CALCIUM 10 MG: 10 TABLET, FILM COATED ORAL at 08:47

## 2019-10-11 RX ADMIN — DIVALPROEX SODIUM 250 MG: 250 TABLET, FILM COATED, EXTENDED RELEASE ORAL at 08:47

## 2019-10-11 RX ADMIN — LEVOTHYROXINE SODIUM 88 MCG: 88 TABLET ORAL at 05:32

## 2019-10-11 RX ADMIN — AMIODARONE HYDROCHLORIDE 200 MG: 200 TABLET ORAL at 07:30

## 2019-10-11 RX ADMIN — VITAMIN D, TAB 1000IU (100/BT) 1000 UNITS: 25 TAB at 08:47

## 2019-10-11 RX ADMIN — BUPROPION HYDROCHLORIDE 150 MG: 150 TABLET, FILM COATED, EXTENDED RELEASE ORAL at 08:47

## 2019-10-11 RX ADMIN — Medication 1 MG/HR: at 05:57

## 2019-10-11 RX ADMIN — CARBAMAZEPINE 100 MG: 100 TABLET, CHEWABLE ORAL at 16:30

## 2019-10-11 RX ADMIN — Medication 250 MG: at 17:31

## 2019-10-11 RX ADMIN — FERROUS SULFATE TAB 325 MG (65 MG ELEMENTAL FE) 325 MG: 325 (65 FE) TAB at 08:47

## 2019-10-11 RX ADMIN — CLOPIDOGREL BISULFATE 75 MG: 75 TABLET ORAL at 08:47

## 2019-10-11 NOTE — ASSESSMENT & PLAN NOTE
Wt Readings from Last 3 Encounters:   10/11/19 66 kg (145 lb 8 1 oz)   09/30/19 88 3 kg (194 lb 10 7 oz)   07/18/19 83 5 kg (184 lb)     · Acute on chronic diastolic congestive heart failure on admission  Patient has had approximately 10 lb weight gain by report  · Echo shows an EF of 45%, study was technically difficult to evaluate for diastolic dysfunction  · CXR showing cardiomegaly  Pro-BNP elevated 12k  Troponin <0 02   · IV Lasix was no longer effective, switched to IV Bumex still with inadequate output  Bumex drip discontinued secondary to evidence of acute kidney injury  · Cardiology input appreciated, right heart catheterization 10/8  · Daily weights, strict I/Os, low sodium diet    · Monitor electrolytes: keep K+ >4 and Mg >2   · Hypokalemia, add additional 40 mEq oral

## 2019-10-11 NOTE — ASSESSMENT & PLAN NOTE
· History of cerebral aneurysm status post clipping in 1996  Subsequently has migraine headaches  Patient does follow up with [facility] and is maintained on Carbamazepine (as anti seizure) and Depakote (for mood stabilization)  · "bubble" sensation in the front of the head 10/8, unclear etiology; neuro exam nonfocal   · STAT CT head 10/8 showed no acute changes  Headache appears resolved    · Depakote level low, Tegretol level normal

## 2019-10-11 NOTE — PROGRESS NOTES
Cardiology Progress Note - Vale Carroll 69 y o  female MRN: [id number]    Unit/Bed#: -01 Encounter: [id number]      Assessment/Plan:  1- Paroxysmal atrial fibrillation/flutter, currently AF with HR 80s  Continue PO Amiodarone 200 mg daily and Lopressor 50 mg BID  Continue Coumadin, goal INR 2-3   2- Acute on chronic diastolic heart failure, euvolemic to dry on exam  Hold Bumex gtt  Reassess renal function tomorrow  Salt restrictions, daily weights, strict I&Os  3- Coronary artery disease s/p PCI  Continue plavix, statin, beta-blocker  4- PAD s/p b/l iliac and right SFA angioplasty  Continue Plavix and statin  5- CKD3 cardiorenal syndrome, Cr increased overnight 1 78 -> 2 29  IV Bumex held  Repeat BMP tomorrow AM  Nephrology following  Subjective:   Patient seen and examined  Patient feeling tired and wiped out today  HR are slightly improved today but persistently atrial flutter in the 80-90s  Denies active CP or discomfort  Objective:     Vitals: Blood pressure 107/70, pulse 91, temperature 98 5 °F (36 9 °C), temperature source Oral, resp  rate 18, weight 66 kg (145 lb 8 1 oz), SpO2 98 %  , Body mass index is 26 61 kg/m² ,   Orthostatic Blood Pressures      Most Recent Value   Blood Pressure  107/70 filed at 10/11/2019 1500   Patient Position - Orthostatic VS  Lying filed at 10/11/2019 1500            Intake/Output Summary (Last 24 hours) at 10/11/2019 1617  Last data filed at 10/11/2019 0701  Gross per 24 hour   Intake 277 4 ml   Output 1450 ml   Net -1172 6 ml         Physical Exam:    GEN: Vale Carroll appears well, alert and oriented x 3, pleasant and cooperative   HEENT: pupils equal, round, and reactive to light; extraocular muscles intact  NECK: supple, no carotid bruits   HEART: regular rhythm, normal S1 and S2, no murmurs, clicks, gallops or rubs   LUNGS: clear to auscultation bilaterally; no wheezes, rales, or rhonchi   ABDOMEN: normal bowel sounds, soft, no tenderness, no distention  EXTREMITIES: peripheral pulses normal; no clubbing, cyanosis, or edema      Medications:      Current Facility-Administered Medications:     acetaminophen (TYLENOL) tablet 650 mg, 650 mg, Oral, Q6H PRN, Paolo Cazares, DO, 650 mg at 10/10/19 1136    albuterol (PROVENTIL HFA,VENTOLIN HFA) inhaler 2 puff, 2 puff, Inhalation, Q6H PRN, Megan Stalls, DO    amiodarone tablet 200 mg, 200 mg, Oral, Daily With Breakfast, Christopher Brisk, DO, 200 mg at 10/11/19 0730    atorvastatin (LIPITOR) tablet 10 mg, 10 mg, Oral, Daily, Paolo Cazares, DO, 10 mg at 10/11/19 0847    buPROPion (WELLBUTRIN SR) 12 hr tablet 150 mg, 150 mg, Oral, Daily, Paolo Cazares, DO, 150 mg at 10/11/19 0847    butalbital-acetaminophen-caffeine (FIORICET,ESGIC) -40 mg per tablet 1 tablet, 1 tablet, Oral, Q4H PRN, Megan Stalls, DO    carBAMazepine (TEGretol) chewable tablet 100 mg, 100 mg, Oral, TID, Paolo Cazares, DO, 100 mg at 10/11/19 0848    cholecalciferol (VITAMIN D3) tablet 1,000 Units, 1,000 Units, Oral, Daily, Dania Stalls, DO, 1,000 Units at 10/11/19 0847    clopidogrel (PLAVIX) tablet 75 mg, 75 mg, Oral, Daily, Paolo Cazares, DO, 75 mg at 10/11/19 0847    diltiazem (CARDIZEM) injection 10 mg, 10 mg, Intravenous, Q4H PRN, Dania Stalls, DO    divalproex sodium (DEPAKOTE ER) 24 hr tablet 250 mg, 250 mg, Oral, BID, Paolo Cazares, DO, 250 mg at 10/11/19 0847    ferrous sulfate tablet 325 mg, 325 mg, Oral, BID, Paolo Cazares, DO, 325 mg at 10/11/19 0847    fluticasone-vilanterol (BREO ELLIPTA) 100-25 mcg/inh inhaler 1 puff, 1 puff, Inhalation, Daily, Paolo Cazares DO, 1 puff at 10/11/19 0848    levothyroxine tablet 88 mcg, 88 mcg, Oral, Daily, Paolo Cazares DO, 88 mcg at 10/11/19 0532    LORazepam (ATIVAN) tablet 0 5 mg, 0 5 mg, Oral, BID, Paolo Cazares DO, 0 5 mg at 10/11/19 0847    metoprolol tartrate (LOPRESSOR) tablet 50 mg, 50 mg, Oral, Q12H [facility], Christopher Mendoza DO, 50 mg at 10/11/19 0847    ondansetron [facility]) injection 4 mg, 4 mg, Intravenous, Q4H PRN, Paolo Cazares, DO    phenol (CHLORASEPTIC) 1 4 % mucosal liquid 1 spray, 1 spray, Mouth/Throat, Q2H PRN, Azalia Rueda PA-C    potassium chloride (K-DUR,KLOR-CON) CR tablet 20 mEq, 20 mEq, Oral, BID With Meals, Jim Rueda PA-C, 20 mEq at 10/11/19 0730    saccharomyces boulardii (FLORASTOR) capsule 250 mg, 250 mg, Oral, BID, Paolo Cazares, DO, 250 mg at 10/11/19 0848    senna-docusate sodium (SENOKOT S) 8 6-50 mg per tablet 1 tablet, 1 tablet, Oral, Daily PRN, Michelle Cuevas DO    warfarin (COUMADIN) tablet 3 mg, 3 mg, Oral, Daily (warfarin), Jim Rueda PA-C, 3 mg at 10/10/19 1727     Labs & Results:        Results from last 7 days   Lab Units 10/11/19  0519 10/09/19  0447 10/07/19  0452   WBC Thousand/uL 6 37 5 94 6 18   HEMOGLOBIN g/dL 13 7 12 9 12 3   HEMATOCRIT % 41 2 39 3 37 8   PLATELETS Thousands/uL 179 175 170         Results from last 7 days   Lab Units 10/11/19  0519 10/09/19  0447 10/08/19  0538   POTASSIUM mmol/L 3 0* 3 9 3 4*   CHLORIDE mmol/L 100 104 102   CO2 mmol/L 34* 30 31   BUN mg/dL 57* 42* 40*   CREATININE mg/dL 2 29* 1 78* 1 91*   CALCIUM mg/dL 9 8 9 6 9 6     Results from last 7 days   Lab Units 10/11/19  0645 10/10/19  0433 10/09/19  0709   INR  2 41* 2 30* 2 25*     Results from last 7 days   Lab Units 10/11/19  0519 10/08/19  0538 10/07/19  0452   MAGNESIUM mg/dL 2 3 2 1 2 1

## 2019-10-11 NOTE — PLAN OF CARE
Problem: PHYSICAL THERAPY ADULT  Goal: Performs mobility at highest level of function for planned discharge setting  See evaluation for individualized goals  Description  Treatment/Interventions: Functional transfer training, LE strengthening/ROM, Therapeutic exercise, Endurance training, Elevations, Cognitive reorientation, Patient/family training, Equipment eval/education, Bed mobility, Gait training, Spoke to nursing  Equipment Recommended: [place]z       See flowsheet documentation for full assessment, interventions and recommendations  Outcome: Progressing  Note:   Prognosis: Good  Problem List: Decreased strength, Decreased range of motion, Decreased endurance, Impaired balance, Decreased coordination, Decreased mobility, Decreased cognition, Decreased safety awareness, Impaired hearing  Assessment: Pt was found seated in bedside chair with daughter present  She was able to perform all xfers this session in increased (A) needed compared to last session since she has been more sedentary the last few days  Encouraged and explained the importance of re positioning and mobility to the pt lucas semi compliance noted  She was able to ambulate into the hallway using a RW needing excessive VCing to keep the RW close to her boday as well as to take bigger steps  Pt was fatigued mid way through and post ambulation and required short rest breaks in between  She was instructed on and performed seated TE as charted with no increased symptoms, just muscular fatigue  Pt had all needs met and call bell in reach  She is not yet ready to go back to the [facility] due to her unable to perform steps today due to limited activity tolerance and fatigue  Pt would benefit from continued PT in order to promote safe and functional mobility  Recommendation: Home PT(return to [facility] with HHPT once stairs completed again)     PT - OK to Discharge: No(Needs to perform stairs again )    See flowsheet documentation for full assessment

## 2019-10-11 NOTE — ASSESSMENT & PLAN NOTE
· Peripheral artery disease status post lower extremity stenting  No evidence of claudication at this time  · Continue Clopidogrel and Atorvastatin  · Patient was due to have a follow-up ultrasound to ensure stents are patent on Friday - will need to be rescheduled outpatient

## 2019-10-11 NOTE — CONSULTS
NEPHROLOGY CONSULTATION NOTE    Patient: Gael Reyes               Sex: female          DOA: 10/3/2019  3:21 PM   Date of Birth: @        Age: @        LOS:  LOS: 8 days     REFERRING PHYSICIAN: Hawa Rios PA-C     REASON FOR THE REFERRAL / CONSULTATION:  Further management of TAM    DATE OF CONSULTATION / SERVICE: 10/11/2019    ADMISSION DIAGNOSIS: Acute on chronic diastolic congestive heart failure ([address] )     CHIEF COMPLAINT     Patient was admitted on 10/3/2019 with chief complaint of shortness of breath    HPI     This is a 41-year-old female with past medical history of solitary kidney (absent right kidney), CKD stage 3, cerebral aneurysm s/p clipping, peripheral artery disease s/p lower extremity stent, dementia, AFib was initially admitted with shortness of breath  During the hospital stay patient's renal function has worsened and Nephrology were consulted for further management of TAM  Patient's daughter was present at the time of consultation and most of the history was obtained from her  According to patient's daughter she was born with 1 kidney and also currently been followed by outside / non [facility] nephrologist and has underlying CKD stage 3  Patient's baseline creatinine seems to be fluctuating around 1 4-1 6  Current creatinine is 2 29  Patient was initially admitted with shortness of breath and during the hospital stay patient had underwent right cardiac catheterization and also currently been followed by Cardiology team   Yesterday patient was started on Bumex drip due to inadequate urinary output earlier  According to patient's daughter, patient also have bladder issue and also have chronic indwelling Davis catheter  Patient is net 5 8 L negative since admission  Patient also have underlying AFib and currently also taking amiodarone  Patient is also on Coumadin      Patient also has hypothyroidism which seems under well control with the use of oral levothyroxine  Patient's hypertension also seems under well control with the use of metoprolol  Currently patient denies nausea, vomiting, headache, dizziness, abdominal pain, constipation or rash      PAST MEDICAL HISTORY     Past Medical History:   Diagnosis Date    Atrial fibrillation [facility] [address] )     Brain aneurysm     CAD (coronary artery disease)     Cardiac disease     had st[facility] 12 years ago [facility]    CKD (chronic kidney disease) stage 3, GFR 30-59 ml/min (HCC) ckd    COPD [facility]chronic obstructive pulmonary disease) [facility]     Dementia (HCC)     Diastolic CHF, chronic (HCC)     GERD (gastroesophageal reflux disease)     Hyperlipidemia     Hyperlipidemia     Hypertension     Hypothyroidism     Migraine     PAD [facility]peripheral artery disease) ([facility]     Renal disorder     Seizures (HCC)     Stroke ([facility] [address] )        PAST SURGICAL HISTORY     Past Surgical History:   Procedure Laterality Date    APPENDECTOMY      ARTERIOGRAM N/A 12/7/2018    Procedure: ARTERIOGRAM WITH STENT PLACEMENT;  Surgeon: Williams Sicard Doctor, MD;  [facility]: [facility];  Service: Vascular    BLADDER TUMOR EXCISION      BRAIN SURGERY      1998  clip right frontal lobe    CARDIAC SURGERY      COLONOSCOPY      CORONARY STENT PLACEMENT      5 stents    EYE SURGERY      IR ABDOMINAL ANGIOGRAPHY / INTERVENTION  12/7/2018    MANDIBLE FRACTURE SURGERY      TONSILLECTOMY      TUBAL LIGATION      UTERINE FIBROID SURGERY         ALLERGIES     Allergies   Allergen Reactions    Spiriva Handihaler [Tiotropium Bromide Monohydrate] Swelling    Ramipril Facial Swelling    Penicillins      unsure       SOCIAL HISTORY     Social History     Substance and Sexual Activity   Alcohol Use Never    Alcohol/week: 0 0 standard drinks    Frequency: Never     Social History     Substance and Sexual Activity   Drug Use No     Social History     Tobacco Use   Smoking Status Former Smoker    Packs/day: 5 00    Years: 40 00    Pack years: 200 00    Last attempt to quit: 2007    Years since quittin 1   Smokeless Tobacco Never Used       FAMILY HISTORY     Family History   Problem Relation Age of Onset    Heart disease Father     Parkinsonism Father     Macular degeneration Mother     Glaucoma Mother     Diabetes Brother     Heart disease Brother        CURRENT MEDICATIONS       Current Facility-Administered Medications:     acetaminophen (TYLENOL) tablet 650 mg, 650 mg, Oral, Q6H PRN, Paolo Sage, DO, 650 mg at 10/10/19 1136    albuterol (PROVENTIL HFA,VENTOLIN HFA) inhaler 2 puff, 2 puff, Inhalation, Q6H PRN, Longo Lisa, DO    amiodarone tablet 200 mg, 200 mg, Oral, Daily With Breakfast, Glennlon Police, DO, 200 mg at 10/11/19 0730    atorvastatin (LIPITOR) tablet 10 mg, 10 mg, Oral, Daily, Paolo Braggng, DO, 10 mg at 10/11/19 0847    bumetanide (BUMEX) 12 5 mg infusion 50 mL, 1 mg/hr, Intravenous, Continuous, [facility], [place] Police, DO, Last Rate: 4 mL/hr at 10/11/19 0557, 1 mg/hr at 10/11/19 0557    buPROPion (WELLBUTRIN SR) 12 hr tablet 150 mg, 150 mg, Oral, Daily, Paolo Sage, DO, 150 mg at 10/11/19 0847    butalbital-acetaminophen-caffeine (FIORICET,ESGIC) -40 mg per tablet 1 tablet, 1 tablet, Oral, Q4H PRN, Qing Lisa, DO    carBAMazepine (TEGretol) chewable tablet 100 mg, 100 mg, Oral, TID, Paolo Braggng, DO, 100 mg at 10/11/19 0848    cholecalciferol (VITAMIN D3) tablet 1,000 Units, 1,000 Units, Oral, Daily, Paolo Sage, DO, 1,000 Units at 10/11/19 0847    clopidogrel (PLAVIX) tablet 75 mg, 75 mg, Oral, Daily, Paolo Sage, DO, 75 mg at 10/11/19 0847    diltiazem (CARDIZEM) injection 10 mg, 10 mg, Intravenous, Q4H PRN, Longo Lisa, DO    divalproex sodium (DEPAKOTE ER) 24 hr tablet 250 mg, 250 mg, Oral, BID, Paolo Cazares DO, 250 mg at 10/11/19 0847    ferrous sulfate tablet 325 mg, 325 mg, Oral, BID, Paolo Cazares DO, 325 mg at 10/11/19 0847    fluticasone-vilanterol (BREO ELLIPTA) 100-25 mcg/inh inhaler 1 puff, 1 puff, Inhalation, Daily, Paolo Cazares, DO, 1 puff at 10/11/19 0848    levothyroxine tablet 88 mcg, 88 mcg, Oral, Daily, Paolo Sage, DO, 88 mcg at 10/11/19 0532    LORazepam (ATIVAN) tablet 0 5 mg, 0 5 mg, Oral, BID, Paolo Sage, DO, 0 5 mg at 10/11/19 0847    metolazone (ZAROXOLYN) tablet 5 mg, 5 mg, Oral, Daily, Isidro [place], DO, 5 mg at 10/11/19 0848    metoprolol tartrate (LOPRESSOR) tablet 50 mg, 50 mg, Oral, Q12H [address], Isidro [place], DO, 50 mg at 10/11/19 0847    ondansetron (ZOFRAN) injection 4 mg, 4 mg, Intravenous, Q4H PRN, Vanessa Sago, DO    potassium chloride (K-DUR,KLOR-CON) CR tablet 20 mEq, 20 mEq, Oral, BID With Meals, Azalia Rueda PA-C, 20 mEq at 10/11/19 0730    saccharomyces boulardii (FLORASTOR) capsule 250 mg, 250 mg, Oral, BID, Paolo Sage, DO, 250 mg at 10/11/19 0848    senna-docusate sodium (SENOKOT S) 8 6-50 mg per tablet 1 tablet, 1 tablet, Oral, Daily PRN, Vanessa Sago, DO    warfarin (COUMADIN) tablet 3 mg, 3 mg, Oral, Daily (warfarin), Azalia Rueda PA-C, 3 mg at 10/10/19 1727    REVIEW OF SYSTEMS     Complete 10 points of review of systems were obtained and discussed in length with patient today  Complete 10 points of review of systems were negative/unremarkable except mentioned in the HPI section  OBJECTIVE     Current Weight: Weight - Scale: 66 kg (145 lb 8 1 oz)  Vitals:    10/11/19 0700   BP: 103/67   Pulse: (!) 109   Resp: 18   Temp: 98 °F (36 7 °C)   SpO2: 94%     Body mass index is 26 61 kg/m²  Intake/Output Summary (Last 24 hours) at 10/11/2019 1137  Last data filed at 10/11/2019 0701  Gross per 24 hour   Intake 277 4 ml   Output 1950 ml   Net -1672 6 ml       PHYSICAL EXAMINATION     Physical Exam   Constitutional: No distress  Ill looking   HENT:   Head: Normocephalic and atraumatic  Eyes: No scleral icterus  Neck: Neck supple  No JVD present  Cardiovascular: Normal rate  Pulmonary/Chest: No accessory muscle usage  No respiratory distress  She has no wheezes  Abdominal: Soft  She exhibits no distension  Musculoskeletal:        Right hand: She exhibits no laceration  Left hand: She exhibits no laceration  Neurological: She is alert  Skin: Skin is warm  No cyanosis  Psychiatric: She is not combative  She expresses no suicidal ideation  LAB RESULTS        Results from last 7 days   Lab Units 10/11/19  0519 10/09/19  0447 10/08/19  0538 10/07/19  0452 10/06/19  0911 10/06/19  0856   WBC Thousand/uL 6 37 5 94  --  6 18 5 85  --    HEMOGLOBIN g/dL 13 7 12 9  --  12 3 12 6  --    HEMATOCRIT % 41 2 39 3  --  37 8 38 6  --    PLATELETS Thousands/uL 179 175  --  170 185  --    SODIUM mmol/L 145 142 145 144  --  141   POTASSIUM mmol/L 3 0* 3 9 3 4* 3 8  --  3 9   CHLORIDE mmol/L 100 104 102 105  --  104   CO2 mmol/L 34* 30 31 30  --  28   BUN mg/dL 57* 42* 40* 36*  --  26*   CREATININE mg/dL 2 29* 1 78* 1 91* 1 83*  --  1 63*   EGFR ml/min/1 73sq m 21 28 26 27  --  31   CALCIUM mg/dL 9 8 9 6 9 6 9 5  --  9 5   MAGNESIUM mg/dL 2 3  --  2 1 2 1  --  2 1       I have personally reviewed the old medical records and patient's previously known baseline creatinine level is ~ 1 4-1 6    RADIOLOGY RESULTS     CT head wo contrast   Final Result by Maurisio Torrez MD (10/08 1333)      Chronic intracranial changes identified  No acute intracranial abnormality                  Workstation performed: [id number]         XR chest 2 views   ED Interpretation by Erendira Serna PA-C (10/03 1649)   Cardiomegaly with pulmonary vascular congestion      Final Result by Christopher Dhillon MD (10/04 1053)      Cardiomegaly  Minimal prominence of the pulmonary vessels  Workstation performed: [id number]           2D echocardiogram done on 10/4/2019 showed EF of 45% with mild diffuse hypokinesia  PLAN / RECOMMENDATIONS      1  TAM on top of CKD stage 3    Multifactorial and suspected due to intravascular volume depletion in the setting of use of diuretics  Patient has solitary kidney (absent right kidney) and according to daughter patient was born with 1 kidney  Upon review of old medical records, patient's baseline creatinine was around 1 4-1 6  Patient had underwent cardiac catheterization right-sided during the hospital stay and also currently been followed by Cardiology team and yesterday patient was started on Bumex drip  Patient is net 5 8 L negative since admission  Overnight patient's renal function has worsened to current creatinine of 2 29  Patient also have indwelling Davis catheter and currently seems nonoliguric  Would defer further management of diuretics to Cardiology team but would consider decreasing/stopping diuretics if renal function continue to worsen  Plan to avoid NSAIDs  Recheck renal function with AM labs  2  Hypertension in chronic kidney disease  Currently blood pressure is under well control and monitor hypertension with metoprolol 50 mg PO BID  3  Azotemia  Multifactorial, slowly worsening with current BUN of 57 which can be secondary to aggressive diuresis with the use of Bumex drip  Defer diuretics as mentioned earlier  Monitor BUN level  Thank you for the consultation to participate in patient's care  I have personally discussed my overall above mentioned plan with the referring physician  Esperanza Frausto MD  Nephrology  10/11/2019        Portions of the record may have been created with voice recognition software  Occasional wrong word or "sound a like" substitutions may have occurred due to the inherent limitations of voice recognition software  Read the chart carefully and recognize, using context, where substitutions have occurred

## 2019-10-11 NOTE — PHYSICAL THERAPY NOTE
Physical Therapy Progress Note     10/11/19 8710   Pain Assessment   Pain Assessment 0-10   Pain Score No Pain   Restrictions/Precautions   Weight Bearing Precautions Per Order No   Other Precautions Cognitive; Chair Alarm;Multiple lines;Telemetry;O2;Fall Risk;Hard of hearing  (1 5L O2)   General   Response to Previous Treatment Patient with no complaints from previous session  Family/Caregiver Present Yes   Cognition   Overall Cognitive Status WFL   Orientation Level Oriented to person;Oriented to place   Memory Decreased recall of recent events;Decreased short term memory   Following Commands Follows one step commands with increased time or repetition   Comments Pt was identified by name and , agreeable to treatment  Transfers   Sit to Stand 3  Moderate assistance   Additional items Assist x 1; Increased time required;Verbal cues   Stand to Sit 4  Minimal assistance   Additional items Assist x 1; Armrests; Increased time required;Verbal cues   Ambulation/Elevation   Gait pattern Narrow JENNYFER; Forward Flexion;Decreased foot clearance; Excessively slow; Short stride;Redundant gait at times; Inconsistent randi; Shuffling   Gait Assistance 4  Minimal assist   Additional items Assist x 1;Verbal cues; Tactile cues  (for AD mgmt )   Assistive Device Rolling walker   Distance 80'x2   Balance   Static Sitting Good   Dynamic Sitting Fair +   Static Standing Fair   Dynamic Standing Fair -   Ambulatory Fair -   Endurance Deficit   Endurance Deficit Yes   Endurance Deficit Description general fatigue   Activity Tolerance   Activity Tolerance Patient limited by fatigue   Nurse Made Aware yes, ok to see   Exercises   TKR Sitting;10 reps;AROM; Bilateral   Assessment   Prognosis Good   Problem List Decreased strength;Decreased range of motion;Decreased endurance; Impaired balance;Decreased coordination;Decreased mobility; Decreased cognition;Decreased safety awareness; Impaired hearing   Assessment Pt was found seated in bedside chair with daughter present  She was able to perform all xfers this session in increased (A) needed compared to last session since she has been more sedentary the last few days  Encouraged and explained the importance of re positioning and mobility to the pt lucas semi compliance noted  She was able to ambulate into the hallway using a RW needing excessive VCing to keep the RW close to her boday as well as to take bigger steps  Pt was fatigued mid way through and post ambulation and required short rest breaks in between  She was instructed on and performed seated TE as charted with no increased symptoms, just muscular fatigue  Pt had all needs met and call bell in reach  She is not yet ready to go back to the [facility] due to her unable to perform steps today due to limited activity tolerance and fatigue  Pt would benefit from continued PT in order to promote safe and functional mobility  Goals   Patient Goals to go for a walk  STG Expiration Date 10/16/19   Short Term Goal #1 pt will: Increase bilateral LE strength 1/2 grade to facilitate independent mobility, Perform all bed mobility tasks independently to decrease fall risk factors, Perform all transfers modified independent to improve independence, Ambulate 500 ft  with roller walker modified independent w/o LOB, Navigate 14 stairs w/ supervision with unilateral handrail to facilitate return to previous living environment, Increase all balance 1/2 grade to decrease risk for falls and Improve Barthel Index score to 95 or greater to facilitate independence   PT Treatment Day 1   Plan   Treatment/Interventions Functional transfer training;LE strengthening/ROM; Therapeutic exercise; Endurance training;Elevations;Cognitive reorientation;Patient/family training;Equipment eval/education; Bed mobility;Gait training;Spoke to nursing   PT Frequency Other (Comment)  (3-5x/wk)   Recommendation   Recommendation Home PT  (return to [facility] with HHPT once stairs completed again) Equipment Recommended Walker   PT - OK to Discharge No  (Needs to perform stairs again )     Juan Marlow, GILBERTO

## 2019-10-11 NOTE — PROGRESS NOTES
Progress Note Forestine Shear 1945, 69 y o  female MRN: [id number]    Unit/Bed#: -01 Encounter: [id number]    Primary Care Provider: Julia Talbert DO   Date and time admitted to hospital: 10/3/2019  3:21 PM      * Acute on chronic diastolic congestive heart failure [facility])  Assessment & Plan  Wt Readings from Last 3 Encounters:   10/11/19 66 kg (145 lb 8 1 oz)   09/30/19 88 3 kg (194 lb 10 7 oz)   07/18/19 83 5 kg (184 lb)     · Acute on chronic diastolic congestive heart failure on admission  Patient has had approximately 10 lb weight gain by report  · Echo shows an EF of 45%, study was technically difficult to evaluate for diastolic dysfunction  · CXR showing cardiomegaly  Pro-BNP elevated 12k  Troponin <0 02   · IV Lasix was no longer effective, switched to IV Bumex still with inadequate output  Bumex drip discontinued secondary to evidence of acute kidney injury  · Cardiology input appreciated, right heart catheterization 10/8  · Daily weights, strict I/Os, low sodium diet  · Monitor electrolytes: keep K+ >4 and Mg >2   · Hypokalemia, add additional 40 mEq oral     Atrial flutter (HCC)  Assessment & Plan  · Atrial fibrillation/flutter with RVR requiring IV Diltiazem in the ED  · Cardiology input appreciated  On Metoprolol and transitioned from amiodarone drip to oral amiodarone  · Continue Telemetry  Continue Coumadin 3 mg at night  Essential hypertension  Assessment & Plan  · Essential hypertension, continue metoprolol  Controlled  PAD (peripheral artery disease) (HCC)  Assessment & Plan  · Peripheral artery disease status post lower extremity stenting  No evidence of claudication at this time  · Continue Clopidogrel and Atorvastatin  · Patient was due to have a follow-up ultrasound to ensure stents are patent on Friday - will need to be rescheduled outpatient  Hypothyroidism  Assessment & Plan  · Hypothyroidism, continue Levothyroxine, normal TSH      Stage 3 chronic kidney disease ([facility] [address] )  Assessment & Plan  · CKD 3  Monitor Cr closely on IV diuretics, BUN/Cr up elevated  · TAM on CKD worsened with Bumex drip, will continue to monitor serial labs  · Nephrology consulted  Results from last 7 days   Lab Units 10/11/19  0519 10/09/19  0447 10/08/19  0538 10/07/19  0452 10/06/19  0856   BUN mg/dL 57* 42* 40* 36* 26*   CREATININE mg/dL 2 29* 1 78* 1 91* 1 83* 1 63*   EGFR ml/min/1 73sq m 21 28 26 27 31       Urinary retention  Assessment & Plan  · Chronic urinary retention with catheter  Was recently seen in the emergency department on 9/30/2019 where she was started on Ciprofloxacin  Urine culture at that time showed E coli  Completed 3 day course antibiotics  Hyperlipidemia  Assessment & Plan  · Hyperlipidemia  Continue Atorvastatin  History of cerebral aneurysm repair  Assessment & Plan  · History of cerebral aneurysm status post clipping in 1996  Subsequently has migraine headaches  Patient does follow up with [facility] and is maintained on Carbamazepine (as anti seizure) and Depakote (for mood stabilization)  · "bubble" sensation in the front of the head 10/8, unclear etiology; neuro exam nonfocal   · STAT CT head 10/8 showed no acute changes  Headache appears resolved  · Depakote level low, Tegretol level normal     Dementia (HCC)  Assessment & Plan  · Dementia without behavioral disturbance  Patient does have underlying depression and mood currently stable on Wellbutrin and Lorazepam   Also takes Depakote for mood stabilization  VTE Pharmacologic Prophylaxis:   Pharmacologic: Warfarin (Coumadin)  Mechanical VTE Prophylaxis in Place: Yes    Patient Centered Rounds: I have performed bedside rounds with nursing staff today      Discussions with Specialists or Other Care Team Provider:  Cardiology, Nephrology, case management    Education and Discussions with Family / Patient:  Patient, daughter present at bedside    Time Spent for Care: 30 minutes  More than 50% of total time spent on counseling and coordination of care as described above  Current Length of Stay: 8 day(s)    Current Patient Status: Inpatient   Certification Statement: The patient will continue to require additional inpatient hospital stay due to Monitor overnight off diuresis, possible discharge tomorrow depending clinical course    Discharge Plan:  Pending stabilization of kidney functions    Code Status: Level 1 - Full Code      Subjective:   Patient seen this morning with her daughter at the bedside  She appears to be somewhat depressed  She was telling her daughter that it was time for her to go, that she does not feel she will get any better  Denies any overt shortness of breath, however she has a sore throat today which started last night  No fevers or chills  No nausea or vomiting  No postnasal drip  No significant cough  Patient is incredibly tired today, feels weaker in general throughout the body, and does not think she is getting up enough  Objective:     Vitals:   Temp (24hrs), Av 6 °F (36 4 °C), Min:97 °F (36 1 °C), Max:98 °F (36 7 °C)    Temp:  [97 °F (36 1 °C)-98 °F (36 7 °C)] 98 °F (36 7 °C)  HR:  [] 99  Resp:  [18-19] 18  BP: (103-142)/(67-92) 115/69  SpO2:  [94 %-100 %] 96 %  Body mass index is 26 61 kg/m²  Input and Output Summary (last 24 hours): Intake/Output Summary (Last 24 hours) at 10/11/2019 1413  Last data filed at 10/11/2019 0701  Gross per 24 hour   Intake 277 4 ml   Output 1950 ml   Net -1672 6 ml       Physical Exam:     Physical Exam   Constitutional: She appears well-developed and well-nourished  No distress  HENT:   Mouth/Throat: Uvula is midline and mucous membranes are normal  Posterior oropharyngeal erythema (Very minimal injection to the posterior pharynx) present  No oropharyngeal exudate or posterior oropharyngeal edema  Cardiovascular: Normal rate, S1 normal, S2 normal and normal heart sounds   An irregularly irregular rhythm present  Pulmonary/Chest: Effort normal and breath sounds normal  No respiratory distress  She has no wheezes  She has no rales  Abdominal: Soft  Bowel sounds are normal  She exhibits no distension  There is no tenderness  Musculoskeletal: She exhibits no edema  Neurological: She is alert  She is disoriented  Oriented to self, location, daughter; not able to identify why she is here   Psychiatric: She exhibits a depressed mood  Nursing note and vitals reviewed  Additional Data:     Labs:    Results from last 7 days   Lab Units 10/11/19  0519   WBC Thousand/uL 6 37   HEMOGLOBIN g/dL 13 7   HEMATOCRIT % 41 2   PLATELETS Thousands/uL 179   NEUTROS PCT % 54   LYMPHS PCT % 30   MONOS PCT % 10   EOS PCT % 5     Results from last 7 days   Lab Units 10/11/19  0519   SODIUM mmol/L 145   POTASSIUM mmol/L 3 0*   CHLORIDE mmol/L 100   CO2 mmol/L 34*   BUN mg/dL 57*   CREATININE mg/dL 2 29*   ANION GAP mmol/L 11   CALCIUM mg/dL 9 8   GLUCOSE RANDOM mg/dL 107     Results from last 7 days   Lab Units 10/11/19  0645   INR  2 41*     Results from last 7 days   Lab Units 10/06/19  0627 10/05/19  1811   POC GLUCOSE mg/dl 102 130                   * I Have Reviewed All Lab Data Listed Above  * Additional Pertinent Lab Tests Reviewed:  All Labs Within Last 24 Hours Reviewed    Imaging:    Imaging Reports Reviewed Today Include:   Imaging Personally Reviewed by Myself Includes:      Recent Cultures (last 7 days):           Last 24 Hours Medication List:     Current Facility-Administered Medications:  acetaminophen 650 mg Oral Q6H PRN Ko Moran, DO   albuterol 2 puff Inhalation Q6H PRN Ko Moran DO   amiodarone 200 mg Oral Daily With Breakfast Matt Ross, DO   atorvastatin 10 mg Oral Daily Paolo Cazares, DO   buPROPion 150 mg Oral Daily Paolo Cazares, DO   butalbital-acetaminophen-caffeine 1 tablet Oral Q4H PRN Ko Moran, DO   carBAMazepine 100 mg Oral TID Ko Moran DO cholecalciferol 1,000 Units Oral Daily Juan Williamson, DO   clopidogrel 75 mg Oral Daily Paolo Cazares, DO   diltiazem 10 mg Intravenous Q4H PRN Juan Williamson, DO   divalproex sodium 250 mg Oral BID Paolo Cazares, DO   ferrous sulfate 325 mg Oral BID Juan Williamson, DO   fluticasone-vilanterol 1 puff Inhalation Daily Juan Williamson, DO   levothyroxine 88 mcg Oral Daily Paolo Cazares, DO   LORazepam 0 5 mg Oral BID Juan Williamson, DO   metoprolol tartrate 50 mg Oral Q12H [address] Steve Mcburney, DO   ondansetron 4 mg Intravenous Q4H PRN Juan Williamson, DO   potassium chloride 20 mEq Oral BID With Meals Azalia Rueda PA-C   saccharomyces boulardii 250 mg Oral BID Paolo Cazares, DO   senna-docusate sodium 1 tablet Oral Daily PRN Juan Williamson, DO   warfarin 3 mg Oral Daily (warfarin) Meliza Cardenas PA-C        Today, Patient Was Seen By: Meliza Cardenas PA-C    ** Please Note: Dictation voice to text software may have been used in the creation of this document   **

## 2019-10-11 NOTE — PLAN OF CARE
Problem: Potential for Falls  Goal: Patient will remain free of falls  Description  INTERVENTIONS:  - Assess patient frequently for physical needs  -  Identify cognitive and physical deficits and behaviors that affect risk of falls  -  [place] fall precautions as indicated by assessment   - Educate patient/family on patient safety including physical limitations  - Instruct patient to call for assistance with activity based on assessment  - Modify environment to reduce risk of injury  - Consider OT/PT consult to assist with strengthening/mobility  Outcome: Progressing     Problem: Prexisting or High Potential for Compromised Skin Integrity  Goal: Skin integrity is maintained or improved  Description  INTERVENTIONS:  - Identify patients at risk for skin breakdown  - Assess and monitor skin integrity  - Assess and monitor nutrition and hydration status  - Monitor labs   - Assess for incontinence   - Turn and reposition patient  - Assist with mobility/ambulation  - Relieve pressure over bony prominences  - Avoid friction and shearing  - Provide appropriate hygiene as needed including keeping skin clean and dry  - Evaluate need for skin moisturizer/barrier cream  - Collaborate with interdisciplinary team   - Patient/family teaching  - Consider wound care consult   Outcome: Progressing     Problem: SAFETY ADULT  Goal: Patient will remain free of falls  Description  INTERVENTIONS:  - Assess patient frequently for physical needs  -  Identify cognitive and physical deficits and behaviors that affect risk of falls    -  [place] fall precautions as indicated by assessment   - Educate patient/family on patient safety including physical limitations  - Instruct patient to call for assistance with activity based on assessment  - Modify environment to reduce risk of injury  - Consider OT/PT consult to assist with strengthening/mobility  Outcome: Progressing  Goal: Maintain or return to baseline ADL function  Description  INTERVENTIONS:  -  Assess patient's ability to carry out ADLs; assess patient's baseline for ADL function and identify physical deficits which impact ability to perform ADLs (bathing, care of mouth/teeth, toileting, grooming, dressing, etc )  - Assess/evaluate cause of self-care deficits   - Assess range of motion  - Assess patient's mobility; develop plan if impaired  - Assess patient's need for assistive devices and provide as appropriate  - Encourage maximum independence but intervene and supervise when necessary  - Involve family in performance of ADLs  - Assess for home care needs following discharge   - Consider OT consult to assist with ADL evaluation and planning for discharge  - Provide patient education as appropriate  Outcome: Progressing  Goal: Maintain or return mobility status to optimal level  Description  INTERVENTIONS:  - Assess patient's baseline mobility status (ambulation, transfers, stairs, etc )    - Identify cognitive and physical deficits and behaviors that affect mobility  - Identify mobility aids required to assist with transfers and/or ambulation (gait belt, sit-to-stand, lift, walker, cane, etc )  - [place] fall precautions as indicated by assessment  - Record patient progress and toleration of activity level on Mobility SBAR; progress patient to next Phase/Stage  - Instruct patient to call for assistance with activity based on assessment  - Consider rehabilitation consult to assist with strengthening/weightbearing, etc   Outcome: Progressing     Problem: DISCHARGE PLANNING  Goal: Discharge to home or other facility with appropriate resources  Description  INTERVENTIONS:  - Identify barriers to discharge w/patient and caregiver  - Arrange for needed discharge resources and transportation as appropriate  - Identify discharge learning needs (meds, wound care, etc )  - Arrange for interpretive services to assist at discharge as needed  - Refer to Case Management Department for coordinating discharge planning if the patient needs post-hospital services based on physician/advanced practitioner order or complex needs related to functional status, cognitive ability, or social support system  Outcome: Progressing     Problem: Knowledge Deficit  Goal: Patient/family/caregiver demonstrates understanding of disease process, treatment plan, medications, and discharge instructions  Description  Complete learning assessment and assess knowledge base    Interventions:  - Provide teaching at level of understanding  - Provide teaching via preferred learning methods  Outcome: Progressing     Problem: CARDIOVASCULAR - ADULT  Goal: Maintains optimal cardiac output and hemodynamic stability  Description  INTERVENTIONS:  - Monitor I/O, vital signs and rhythm  - Monitor for S/S and trends of decreased cardiac output  - Administer and titrate ordered vasoactive medications to optimize hemodynamic stability  - Assess quality of pulses, skin color and temperature  - Assess for signs of decreased coronary artery perfusion  - Instruct patient to report change in severity of symptoms  Outcome: Progressing  Goal: Absence of cardiac dysrhythmias or at baseline rhythm  Description  INTERVENTIONS:  - Continuous cardiac monitoring, vital signs, obtain 12 lead EKG if ordered  - Administer antiarrhythmic and heart rate control medications as ordered  - Monitor electrolytes and administer replacement therapy as ordered  Outcome: Progressing     Problem: GENITOURINARY - ADULT  Goal: Maintains or returns to baseline urinary function  Description  INTERVENTIONS:  - Assess urinary function  - Encourage oral fluids to ensure adequate hydration if ordered  - Administer IV fluids as ordered to ensure adequate hydration  - Administer ordered medications as needed  - Offer frequent toileting  - Follow urinary retention protocol if ordered  Outcome: Progressing  Goal: Absence of urinary retention  Description  INTERVENTIONS:  - Assess patients ability to void and empty bladder  - Monitor I/O  - Bladder scan as needed  - Discuss with physician/AP medications to alleviate retention as needed  - Discuss catheterization for long term situations as appropriate  Outcome: Progressing  Goal: Urinary catheter remains patent  Description  INTERVENTIONS:  - Assess patency of urinary catheter  - If patient has a chronic baca, consider changing catheter if non-functioning  - Follow guidelines for intermittent irrigation of non-functioning urinary catheter  Outcome: Progressing     Problem: MUSCULOSKELETAL - ADULT  Goal: Maintain or return mobility to safest level of function  Description  INTERVENTIONS:  - Assess patient's ability to carry out ADLs; assess patient's baseline for ADL function and identify physical deficits which impact ability to perform ADLs (bathing, care of mouth/teeth, toileting, grooming, dressing, etc )  - Assess/evaluate cause of self-care deficits   - Assess range of motion  - Assess patient's mobility  - Assess patient's need for assistive devices and provide as appropriate  - Encourage maximum independence but intervene and supervise when necessary  - Involve family in performance of ADLs  - Assess for home care needs following discharge   - Consider OT consult to assist with ADL evaluation and planning for discharge  - Provide patient education as appropriate  Outcome: Progressing

## 2019-10-12 LAB
ANION GAP SERPL CALCULATED.3IONS-SCNC: 9 MMOL/L (ref 4–13)
BASOPHILS # BLD AUTO: 0.03 THOUSANDS/ΜL (ref 0–0.1)
BASOPHILS NFR BLD AUTO: 1 % (ref 0–1)
BUN SERPL-MCNC: 66 MG/DL (ref 5–25)
CALCIUM SERPL-MCNC: 9.7 MG/DL (ref 8.3–10.1)
CHLORIDE SERPL-SCNC: 101 MMOL/L (ref 100–108)
CO2 SERPL-SCNC: 34 MMOL/L (ref 21–32)
CREAT SERPL-MCNC: 2.72 MG/DL (ref 0.6–1.3)
EOSINOPHIL # BLD AUTO: 0.37 THOUSAND/ΜL (ref 0–0.61)
EOSINOPHIL NFR BLD AUTO: 6 % (ref 0–6)
ERYTHROCYTE [DISTWIDTH] IN BLOOD BY AUTOMATED COUNT: 12.7 % (ref 11.6–15.1)
GFR SERPL CREATININE-BSD FRML MDRD: 17 ML/MIN/1.73SQ M
GLUCOSE SERPL-MCNC: 111 MG/DL (ref 65–140)
HCT VFR BLD AUTO: 43.1 % (ref 34.8–46.1)
HGB BLD-MCNC: 14.2 G/DL (ref 11.5–15.4)
IMM GRANULOCYTES # BLD AUTO: 0.02 THOUSAND/UL (ref 0–0.2)
IMM GRANULOCYTES NFR BLD AUTO: 0 % (ref 0–2)
INR PPP: 2.94 (ref 0.84–1.19)
LYMPHOCYTES # BLD AUTO: 2.11 THOUSANDS/ΜL (ref 0.6–4.47)
LYMPHOCYTES NFR BLD AUTO: 32 % (ref 14–44)
MCH RBC QN AUTO: 34.1 PG (ref 26.8–34.3)
MCHC RBC AUTO-ENTMCNC: 32.9 G/DL (ref 31.4–37.4)
MCV RBC AUTO: 103 FL (ref 82–98)
MONOCYTES # BLD AUTO: 0.72 THOUSAND/ΜL (ref 0.17–1.22)
MONOCYTES NFR BLD AUTO: 11 % (ref 4–12)
NEUTROPHILS # BLD AUTO: 3.31 THOUSANDS/ΜL (ref 1.85–7.62)
NEUTS SEG NFR BLD AUTO: 50 % (ref 43–75)
NRBC BLD AUTO-RTO: 0 /100 WBCS
PLATELET # BLD AUTO: 195 THOUSANDS/UL (ref 149–390)
PMV BLD AUTO: 12.4 FL (ref 8.9–12.7)
POTASSIUM SERPL-SCNC: 4.3 MMOL/L (ref 3.5–5.3)
PROTHROMBIN TIME: 31 SECONDS (ref 11.6–14.5)
RBC # BLD AUTO: 4.17 MILLION/UL (ref 3.81–5.12)
SODIUM SERPL-SCNC: 144 MMOL/L (ref 136–145)
WBC # BLD AUTO: 6.56 THOUSAND/UL (ref 4.31–10.16)

## 2019-10-12 PROCEDURE — 80048 BASIC METABOLIC PNL TOTAL CA: CPT | Performed by: INTERNAL MEDICINE

## 2019-10-12 PROCEDURE — 97110 THERAPEUTIC EXERCISES: CPT

## 2019-10-12 PROCEDURE — 99232 SBSQ HOSP IP/OBS MODERATE 35: CPT | Performed by: PHYSICIAN ASSISTANT

## 2019-10-12 PROCEDURE — 85025 COMPLETE CBC W/AUTO DIFF WBC: CPT | Performed by: INTERNAL MEDICINE

## 2019-10-12 PROCEDURE — 99232 SBSQ HOSP IP/OBS MODERATE 35: CPT | Performed by: INTERNAL MEDICINE

## 2019-10-12 PROCEDURE — 97116 GAIT TRAINING THERAPY: CPT

## 2019-10-12 PROCEDURE — 85610 PROTHROMBIN TIME: CPT | Performed by: PHYSICIAN ASSISTANT

## 2019-10-12 PROCEDURE — 99233 SBSQ HOSP IP/OBS HIGH 50: CPT | Performed by: INTERNAL MEDICINE

## 2019-10-12 RX ORDER — WARFARIN SODIUM 2 MG/1
1 TABLET ORAL
Status: COMPLETED | OUTPATIENT
Start: 2019-10-12 | End: 2019-10-12

## 2019-10-12 RX ORDER — AMOXICILLIN 250 MG
1 CAPSULE ORAL 2 TIMES DAILY
Status: DISCONTINUED | OUTPATIENT
Start: 2019-10-12 | End: 2019-10-15 | Stop reason: HOSPADM

## 2019-10-12 RX ORDER — WARFARIN SODIUM 3 MG/1
3 TABLET ORAL
Status: DISCONTINUED | OUTPATIENT
Start: 2019-10-13 | End: 2019-10-13

## 2019-10-12 RX ADMIN — Medication 250 MG: at 08:46

## 2019-10-12 RX ADMIN — BUPROPION HYDROCHLORIDE 150 MG: 150 TABLET, FILM COATED, EXTENDED RELEASE ORAL at 08:43

## 2019-10-12 RX ADMIN — METOPROLOL TARTRATE 50 MG: 50 TABLET, FILM COATED ORAL at 21:47

## 2019-10-12 RX ADMIN — LEVOTHYROXINE SODIUM 88 MCG: 88 TABLET ORAL at 06:22

## 2019-10-12 RX ADMIN — FLUTICASONE FUROATE AND VILANTEROL TRIFENATATE 1 PUFF: 100; 25 POWDER RESPIRATORY (INHALATION) at 08:51

## 2019-10-12 RX ADMIN — SODIUM CHLORIDE 250 ML: 0.9 INJECTION, SOLUTION INTRAVENOUS at 12:22

## 2019-10-12 RX ADMIN — SENNOSIDES AND DOCUSATE SODIUM 1 TABLET: 8.6; 5 TABLET ORAL at 18:04

## 2019-10-12 RX ADMIN — CARBAMAZEPINE 100 MG: 100 TABLET, CHEWABLE ORAL at 21:47

## 2019-10-12 RX ADMIN — Medication 250 MG: at 18:03

## 2019-10-12 RX ADMIN — FERROUS SULFATE TAB 325 MG (65 MG ELEMENTAL FE) 325 MG: 325 (65 FE) TAB at 18:04

## 2019-10-12 RX ADMIN — LORAZEPAM 0.5 MG: 0.5 TABLET ORAL at 08:45

## 2019-10-12 RX ADMIN — DIVALPROEX SODIUM 250 MG: 250 TABLET, FILM COATED, EXTENDED RELEASE ORAL at 18:04

## 2019-10-12 RX ADMIN — ONDANSETRON 4 MG: 2 INJECTION INTRAMUSCULAR; INTRAVENOUS at 09:27

## 2019-10-12 RX ADMIN — POTASSIUM CHLORIDE 20 MEQ: 1500 TABLET, EXTENDED RELEASE ORAL at 08:43

## 2019-10-12 RX ADMIN — WARFARIN SODIUM 1 MG: 2 TABLET ORAL at 18:04

## 2019-10-12 RX ADMIN — DIVALPROEX SODIUM 250 MG: 250 TABLET, FILM COATED, EXTENDED RELEASE ORAL at 08:49

## 2019-10-12 RX ADMIN — FERROUS SULFATE TAB 325 MG (65 MG ELEMENTAL FE) 325 MG: 325 (65 FE) TAB at 08:44

## 2019-10-12 RX ADMIN — METOPROLOL TARTRATE 50 MG: 50 TABLET, FILM COATED ORAL at 08:47

## 2019-10-12 RX ADMIN — CARBAMAZEPINE 100 MG: 100 TABLET, CHEWABLE ORAL at 08:42

## 2019-10-12 RX ADMIN — VITAMIN D, TAB 1000IU (100/BT) 1000 UNITS: 25 TAB at 08:41

## 2019-10-12 RX ADMIN — LORAZEPAM 0.5 MG: 0.5 TABLET ORAL at 18:04

## 2019-10-12 RX ADMIN — CLOPIDOGREL BISULFATE 75 MG: 75 TABLET ORAL at 08:49

## 2019-10-12 RX ADMIN — ATORVASTATIN CALCIUM 10 MG: 10 TABLET, FILM COATED ORAL at 08:46

## 2019-10-12 RX ADMIN — CARBAMAZEPINE 100 MG: 100 TABLET, CHEWABLE ORAL at 16:08

## 2019-10-12 RX ADMIN — SENNOSIDES AND DOCUSATE SODIUM 1 TABLET: 8.6; 5 TABLET ORAL at 13:35

## 2019-10-12 NOTE — ASSESSMENT & PLAN NOTE
· CKD 3  Monitor Cr closely on IV diuretics, BUN/Cr up elevated  · TAM on CKD worsened with Bumex drip which is since discontinued  · Nephrology consult appreciated, will give gentle fluid bolus today due to appearing volume contracted      Results from last 7 days   Lab Units 10/12/19  0602 10/11/19  0519 10/09/19  0447 10/08/19  0538 10/07/19  0452 10/06/19  0856   BUN mg/dL 66* 57* 42* 40* 36* 26*   CREATININE mg/dL 2 72* 2 29* 1 78* 1 91* 1 83* 1 63*   EGFR ml/min/1 73sq m 17 21 28 26 27 31

## 2019-10-12 NOTE — PROGRESS NOTES
Cardiology Progress Note - Jayleen Ellis 69 y o  female MRN: [id number]    Unit/Bed#: -01 Encounter: [id number]      Assessment/Plan:  1  Acute on Chronic diastolic heart failure  · She is euvolemic  Will continue to hold diuretics today and monitor renal function  Agree with mild volume repletion  · When her renal function recovers would discharge on her home dose of oral lasix  2  Persistent atrial fibrillation/flutter  · Rate control adequate  Cont lopressor 50mg po bid and warfarin    3  TAM on CKD/solitary kidney-nephrology following    4  CAD- cont plavix, atorvastatin and metoprolol    5  PAD s/p Stenting- cont warfarin and plavix    6  Dementia       Subjective:   Patient seen and examined  Her daughter notes that she has gotten weaker since coming to the hospital  The patient denies chest pain, dyspnea, or any other cardiac concerns at this time  Objective:     Vitals: Blood pressure 101/70, pulse 101, temperature (!) 97 1 °F (36 2 °C), resp  rate 17, weight 66 kg (145 lb 8 1 oz), SpO2 (!) 86 %  , Body mass index is 26 61 kg/m² ,   Orthostatic Blood Pressures      Most Recent Value   Blood Pressure  101/70 filed at 10/12/2019 8727   Patient Position - Orthostatic VS  Lying filed at 10/12/2019 2571            Intake/Output Summary (Last 24 hours) at 10/12/2019 1078  Last data filed at 10/12/2019 0855  Gross per 24 hour   Intake 205 ml   Output 400 ml   Net -195 ml         Physical Exam:    GEN: Jayleen Ellis appears well, alert, pleasant and cooperative   HEENT: pupils equal, round, and reactive to light; extraocular muscles intact  NECK: supple, no carotid bruits   HEART: irregular rhythm, normal S1 and S2, no murmurs, clicks, gallops or rubs   LUNGS: clear to auscultation bilaterally; no wheezes, rales, or rhonchi   ABDOMEN: normal bowel sounds, soft, no tenderness, no distention  EXTREMITIES: peripheral pulses normal; no clubbing, cyanosis, or edema      Medications:      Current Facility-Administered Medications:     acetaminophen (TYLENOL) tablet 650 mg, 650 mg, Oral, Q6H PRN, Paolo Cazares, DO, 650 mg at 10/10/19 1136    albuterol (PROVENTIL HFA,VENTOLIN HFA) inhaler 2 puff, 2 puff, Inhalation, Q6H PRN, Kashmir Jorge, DO    atorvastatin (LIPITOR) tablet 10 mg, 10 mg, Oral, Daily, Paolo Cazares, DO, 10 mg at 10/12/19 0846    buPROPion (WELLBUTRIN SR) 12 hr tablet 150 mg, 150 mg, Oral, Daily, Paolo Cazares, DO, 150 mg at 10/12/19 0843    butalbital-acetaminophen-caffeine (FIORICET,ESGIC) -40 mg per tablet 1 tablet, 1 tablet, Oral, Q4H PRN, Kashmir Patel, DO    carBAMazepine (TEGretol) chewable tablet 100 mg, 100 mg, Oral, TID, Paolo Cazares, DO, 100 mg at 10/12/19 0842    cholecalciferol (VITAMIN D3) tablet 1,000 Units, 1,000 Units, Oral, Daily, Kashmir Jorge, DO, 1,000 Units at 10/12/19 0841    clopidogrel (PLAVIX) tablet 75 mg, 75 mg, Oral, Daily, Paolo Cazares, DO, 75 mg at 10/12/19 0849    diltiazem (CARDIZEM) injection 10 mg, 10 mg, Intravenous, Q4H PRN, Kashmir [employer], DO    divalproex sodium (DEPAKOTE ER) 24 hr tablet 250 mg, 250 mg, Oral, BID, Paolo Cazares, DO, 250 mg at 10/12/19 0849    ferrous sulfate tablet 325 mg, 325 mg, Oral, BID, Paolo Cazares, DO, 325 mg at 10/12/19 0844    fluticasone-vilanterol (BREO ELLIPTA) 100-25 mcg/inh inhaler 1 puff, 1 puff, Inhalation, Daily, Paolo Cazares, DO, 1 puff at 10/12/19 0851    levothyroxine tablet 88 mcg, 88 mcg, Oral, Daily, Paolo Cazares, DO, 88 mcg at 10/12/19 0622    LORazepam (ATIVAN) tablet 0 5 mg, 0 5 mg, Oral, BID, Paolo Cazares DO, 0 5 mg at 10/12/19 0845    metoprolol tartrate (LOPRESSOR) tablet 50 mg, 50 mg, Oral, Q12H [address], Carina Boswell, , 50 mg at 10/12/19 0847    ondansetron (ZOFRAN) injection 4 mg, 4 mg, Intravenous, Q4H PRN, Paolo Cazares DO    phenol (CHLORASEPTIC) 1 4 % mucosal liquid 1 spray, 1 spray, Mouth/Throat, Q2H PRN, Azalia Rueda PA-C, 1 spray at 10/11/19 [facility] boulardii (FLORASTOR) capsule 250 mg, 250 mg, Oral, BID, Paolo Cazares, DO, 250 mg at 10/12/19 0846    senna-docusate sodium (SENOKOT S) 8 6-50 mg per tablet 1 tablet, 1 tablet, Oral, Daily PRN, [place], DO    warfarin (COUMADIN) tablet 3 mg, 3 mg, Oral, Daily (warfarin), Chica Rueda PA-C, 3 mg at 10/11/19 1731     Labs & Results:        Results from last 7 days   Lab Units 10/12/19  0602 10/11/19  0519 10/09/19  0447   WBC Thousand/uL 6 56 6 37 5 94   HEMOGLOBIN g/dL 14 2 13 7 12 9   HEMATOCRIT % 43 1 41 2 39 3   PLATELETS Thousands/uL 195 179 175         Results from last 7 days   Lab Units 10/12/19  0602 10/11/19  0519 10/09/19  0447   POTASSIUM mmol/L 4 3 3 0* 3 9   CHLORIDE mmol/L 101 100 104   CO2 mmol/L 34* 34* 30   BUN mg/dL 66* 57* 42*   CREATININE mg/dL 2 72* 2 29* 1 78*   CALCIUM mg/dL 9 7 9 8 9 6     Results from last 7 days   Lab Units 10/12/19  0605 10/11/19  0645 10/10/19  0433   INR  2 94* 2 41* 2 30*     Results from last 7 days   Lab Units 10/11/19  0519 10/08/19  0538 10/07/19  0452   MAGNESIUM mg/dL 2 3 2 1 2 1

## 2019-10-12 NOTE — ASSESSMENT & PLAN NOTE
· History of cerebral aneurysm status post clipping in 1996  Subsequently has migraine headaches  Patient does follow up with [facility] and is maintained on Carbamazepine (as anti seizure) and Depakote (for mood stabilization)  · "bubble" sensation in the front of the head 10/8, unclear etiology; neuro exam nonfocal   · STAT CT head 10/8 showed no acute changes  Headache since resolved    · Depakote level low, Tegretol level normal

## 2019-10-12 NOTE — PROGRESS NOTES
NEPHROLOGY PROGRESS NOTE    Patient: John Garvin               Sex: female          DOA: 10/3/2019  3:21 PM   Date of Birth: @        Age: @        LOS:  LOS: 9 days   10/12/2019    REASON FOR THE CONSULTATION:  Further management of TAM    HPI     This is a 69 y o  female admitted for Acute on chronic diastolic congestive heart failure ([facility] [address] )     SUBJECTIVE     - updated patient's daughter at bedside today   Patient found to be nonoliguric  Patient denies nausea, vomiting, headache or dizziness today    - Reviewed last 24 hrs events     CURRENT MEDICATIONS       Current Facility-Administered Medications:     acetaminophen (TYLENOL) tablet 650 mg, 650 mg, Oral, Q6H PRN, Paolo Cazares, DO, 650 mg at 10/10/19 1136    albuterol (PROVENTIL HFA,VENTOLIN HFA) inhaler 2 puff, 2 puff, Inhalation, Q6H PRN, Naomi Hajiwer, DO    atorvastatin (LIPITOR) tablet 10 mg, 10 mg, Oral, Daily, Paolo Cazares, DO, 10 mg at 10/12/19 0846    buPROPion (WELLBUTRIN SR) 12 hr tablet 150 mg, 150 mg, Oral, Daily, Paolo Cazares, DO, 150 mg at 10/12/19 0843    butalbital-acetaminophen-caffeine (FIORICET,ESGIC) -40 mg per tablet 1 tablet, 1 tablet, Oral, Q4H PRN, Naomi Cheema, DO    carBAMazepine (TEGretol) chewable tablet 100 mg, 100 mg, Oral, TID, Paolo Cazares, DO, 100 mg at 10/12/19 0842    cholecalciferol (VITAMIN D3) tablet 1,000 Units, 1,000 Units, Oral, Daily, Naomi Haijwer, DO, 1,000 Units at 10/12/19 0841    clopidogrel (PLAVIX) tablet 75 mg, 75 mg, Oral, Daily, Paolo Cazares, DO, 75 mg at 10/12/19 0849    diltiazem (CARDIZEM) injection 10 mg, 10 mg, Intravenous, Q4H PRN, Naomi Hajiwer, DO    divalproex sodium (DEPAKOTE ER) 24 hr tablet 250 mg, 250 mg, Oral, BID, Paolo Cazares, DO, 250 mg at 10/12/19 0849    ferrous sulfate tablet 325 mg, 325 mg, Oral, BID, Paolo Cazares DO, 325 mg at 10/12/19 0844    fluticasone-vilanterol (BREO ELLIPTA) 100-25 mcg/inh inhaler 1 puff, 1 puff, Inhalation, Daily, Naomi Cheema DO, 1 puff at 10/12/19 0851    levothyroxine tablet 88 mcg, 88 mcg, Oral, Daily, [place] Clipper, DO, 88 mcg at 10/12/19 0622    LORazepam (ATIVAN) tablet 0 5 mg, 0 5 mg, Oral, BID, Paolo Sage, DO, 0 5 mg at 10/12/19 0845    metoprolol tartrate (LOPRESSOR) tablet 50 mg, 50 mg, Oral, Q12H [facility], Kerri Safe, DO, 50 mg at 10/12/19 0847    ondansetron (ZOFRAN) injection 4 mg, 4 mg, Intravenous, Q4H PRN, Paolo Sage, DO, 4 mg at 10/12/19 0927    phenol (CHLORASEPTIC) 1 4 % mucosal liquid 1 spray, 1 spray, Mouth/Throat, Q2H PRN, Azalia Rueda PA-C, 1 spray at 10/11/19 1732    saccharomyces boulardii (FLORASTOR) capsule 250 mg, 250 mg, Oral, BID, Paolo Sage, DO, 250 mg at 10/12/19 0846    senna-docusate sodium (SENOKOT S) 8 6-50 mg per tablet 1 tablet, 1 tablet, Oral, Daily PRN, [place] Clipper, DO    sodium chloride 0 9 % bolus 250 mL, 250 mL, Intravenous, Once, Azalia Rueda PA-C    warfarin (COUMADIN) tablet 1 mg, 1 mg, Oral, Once (warfarin), Azalia Rueda PA-C    [START ON 10/13/2019] warfarin (COUMADIN) tablet 3 mg, 3 mg, Oral, Daily (warfarin), Deneen Chavis PA-C    OBJECTIVE     Current Weight: Weight - Scale: 66 kg (145 lb 8 1 oz)  Vitals:    10/12/19 1118   BP: 98/72   Pulse: 91   Resp: 18   Temp: (!) 96 8 °F (36 °C)   SpO2: 93%     Body mass index is 26 61 kg/m²  Intake/Output Summary (Last 24 hours) at 10/12/2019 1121  Last data filed at 10/12/2019 0900  Gross per 24 hour   Intake 565 ml   Output 400 ml   Net 165 ml       PHYSICAL EXAMINATION     Physical Exam   Constitutional: No distress  HENT:   Head: Normocephalic and atraumatic  Eyes: No scleral icterus  Neck: Neck supple  No JVD present  Cardiovascular: Normal rate  Pulmonary/Chest: No accessory muscle usage  No respiratory distress  She has no wheezes  Abdominal: Soft  She exhibits no distension  Musculoskeletal:        Right hand: She exhibits no laceration  Left hand: She exhibits no laceration  Neurological: She is alert  Skin: Skin is warm  No cyanosis  Psychiatric: She is not combative  She expresses no suicidal ideation  LAB RESULTS     Results from last 7 days   Lab Units 10/12/19  0602 10/11/19  0519 10/09/19  0447 10/08/19  0538 10/07/19  0452 10/06/19  0911 10/06/19  0856   WBC Thousand/uL 6 56 6 37 5 94  --  6 18 5 85  --    HEMOGLOBIN g/dL 14 2 13 7 12 9  --  12 3 12 6  --    HEMATOCRIT % 43 1 41 2 39 3  --  37 8 38 6  --    PLATELETS Thousands/uL 195 179 175  --  170 185  --    SODIUM mmol/L 144 145 142 145 144  --  141   POTASSIUM mmol/L 4 3 3 0* 3 9 3 4* 3 8  --  3 9   CHLORIDE mmol/L 101 100 104 102 105  --  104   CO2 mmol/L 34* 34* 30 31 30  --  28   BUN mg/dL 66* 57* 42* 40* 36*  --  26*   CREATININE mg/dL 2 72* 2 29* 1 78* 1 91* 1 83*  --  1 63*   EGFR ml/min/1 73sq m 17 21 28 26 27  --  31   CALCIUM mg/dL 9 7 9 8 9 6 9 6 9 5  --  9 5   MAGNESIUM mg/dL  --  2 3  --  2 1 2 1  --  2 1       I have personally reviewed the old medical records and patient's previously known baseline creatinine level is ~ 1 4-1 6    RADIOLOGY RESULTS      CT head wo contrast   Final Result by Saul Powell MD (10/08 1333)      Chronic intracranial changes identified  No acute intracranial abnormality                  Workstation performed: [id number]         XR chest 2 views   ED Interpretation by Glenn Candelaria PA-C (10/03 5309)   Cardiomegaly with pulmonary vascular congestion      Final Result by Lamberto Shaw MD (10/04 3108)      Cardiomegaly  Minimal prominence of the pulmonary vessels  Workstation performed: [id number]             PLAN / RECOMMENDATIONS      1  TAM on top of CKD stage 3  FEUrea was 23% which was prerenal in nature suggesting TAM was most likely secondary to use of diuretics leading to intravascular volume depletion      Bumex drip was stopped yesterday and patient has remained nonoliguric but overnight renal function has worsened further to current creatinine of 2 72 (baseline creatinine is around 1 4-1 6 + patient has solitary kidney/absent right kidney)     Clinically patient is not in volume overload state and will continue to hold diuretics for time being  Agree with giving fluid bolus of 250 mL today  No urgent indication to initiate dialysis at this point  Monitor renal function  2  Hypertension in chronic kidney disease  Overnight blood pressure has remained under good control and will continue monitor hypertension with metoprolol 50 mg PO BID  3  Azotemia  Multifactorial, overnight BUN level has worsened to 66  Patient is asymptomatic from azotemia perspective  Patient is now off diuretics  Agree with small fluid bolus today  Monitor BUN level  Overall above mentioned plan was also d/w Alex Marlow MD  Nephrology  10/12/2019        Portions of the record may have been created with voice recognition software  Occasional wrong word or "sound a like" substitutions may have occurred due to the inherent limitations of voice recognition software  Read the chart carefully and recognize, using context, where substitutions have occurred

## 2019-10-12 NOTE — ASSESSMENT & PLAN NOTE
· Atrial fibrillation/flutter with RVR at times  · Cardiology input appreciated  On Metoprolol and transitioned from amiodarone drip to oral amiodarone, despite this patient remains in afib and cardiology has discontinued the amiodarone  · No acute events in 48 hrs, will d/c telemetry  On Coumadin 3 mg at night  · INR is 2 94 today - will give 1 mg Coumadin tonight and resume 3 mg tomorrow  · Daily INR ordered

## 2019-10-12 NOTE — ASSESSMENT & PLAN NOTE
Wt Readings from Last 3 Encounters:   10/11/19 66 kg (145 lb 8 1 oz)   09/30/19 88 3 kg (194 lb 10 7 oz)   07/18/19 83 5 kg (184 lb)     · Acute on chronic diastolic congestive heart failure on admission  Report weight gain prior to admit  · Echo shows an EF of 45%, study was technically difficult to evaluate for diastolic dysfunction  · CXR showing cardiomegaly  Pro-BNP elevated 12k  Troponin <0 02   · Per cardiology, lasix was felt to no longer be effective and switched to bumex and ultimately bumex gtt, and now with TAM  All diuretics currently on hold, nephrology following  Will give gentle hydration today and monitor vitals  · Cardiology input appreciated, right heart catheterization 10/8  · Daily weights, strict I/Os, low sodium diet    · Monitor electrolytes: keep K+ >4 and Mg >2   · Hypokalemia 10/11, resolved

## 2019-10-12 NOTE — PHYSICAL THERAPY NOTE
Physical Therapy Treatment Note       10/12/19 1012   Pain Assessment   Pain Assessment No/denies pain   Pain Score No Pain   Restrictions/Precautions   Weight Bearing Precautions Per Order No   Other Precautions Cognitive; Chair Alarm;O2;Fall Risk;Hard of hearing;Multiple lines  (1 5L O2 NC)   General   Chart Reviewed Yes   Response to Previous Treatment Patient with no complaints from previous session  Family/Caregiver Present Yes  (dtr)   Cognition   Overall Cognitive Status Impaired   Arousal/Participation Alert; Cooperative   Attention Attends with cues to redirect   Orientation Level Oriented to person;Disoriented to place; Disoriented to time;Disoriented to situation   Memory Decreased recall of recent events;Decreased short term memory   Following Commands Follows one step commands with increased time or repetition   Comments pt agreeable to PT session   Subjective   Subjective "I feel so tired"   Bed Mobility   Supine to Sit 4  Minimal assistance   Additional items Assist x 1; Increased time required;Verbal cues;HOB elevated; Bedrails   Additional Comments pt requiring frequent encouragement throughout session 2* fatigue   Transfers   Sit to Stand 4  Minimal assistance   Additional items Assist x 1; Increased time required;Verbal cues   Stand to Sit 4  Minimal assistance   Additional items Assist x 1; Increased time required;Verbal cues   Additional Comments vc for proper sequencing & positioning with RW   Ambulation/Elevation   Gait pattern Narrow JENNYFER; Forward Flexion;Decreased foot clearance; Excessively slow; Short stride; Shuffling; Inconsistent randi   Gait Assistance 4  Minimal assist   Additional items Assist x 1;Verbal cues   Assistive Device Rolling walker   Distance 30' x6  standing & sitting rest breaks required throughout 2* fatigue   Stair Management Assistance Not tested   Balance   Static Sitting Good   Dynamic Sitting Fair +   Static Standing Fair   Dynamic Standing [address] - Endurance Deficit   Endurance Deficit Yes   Endurance Deficit Description fatigue   Activity Tolerance   Activity Tolerance Patient limited by fatigue   Nurse Made Aware TARIQ Mcintosh verbalized pt appropriate to see, made aware of session outcome/recs  MORENA Dumont requesting PT work with pt this date  Exercises   Heelslides Sitting;10 reps;AROM; Bilateral   Hip Abduction Sitting;10 reps;AROM; Bilateral   Hip Adduction Sitting;10 reps;AROM; Bilateral  (returning to midline)   Knee AROM Long Arc Quad Sitting;10 reps;AROM; Bilateral   Ankle Pumps Sitting;10 reps;AROM; Bilateral   Marching Sitting;10 reps;AROM; Bilateral   Assessment   Prognosis Good   Problem List Decreased strength;Decreased range of motion;Decreased endurance; Impaired balance;Decreased mobility; Decreased cognition;Decreased safety awareness; Impaired hearing   Assessment Pt seen for PT treatment session this date with interventions consisting of gait training w/ emphasis on improving pt's ability to ambulate level surfaces x 30' x6 with min A provided by therapist with RW and Therapeutic exercise consisting of: AROM 10 reps B LE in sitting position  Pt agreeable to PT treatment session upon arrival, pt found supine in bed w/ HOB elevated, in no apparent distress, A&O x 1 and responsive  In comparison to previous session, pt with improvements in tolerance to seated ther ex without pain reported, continued household distance gait trials without overt LOB observed  Pt requiring frequent rest breaks throughout session 2* fatigue  SPO2 on 1 5L O2 NC remained in high 90s throughout session, pt denying SOB/GRAF  Post session: pt returned back to recliner, chair alarm engaged, all needs in reach and RN notified of session findings/recommendations and NSG staff educated/encouraged to mobilize/ambulate pt to tolerance- TARIQ Mcintosh verbalizing understanding   Continue to recommend anticipated return to [facility] c PT services vs STR at time of d/c in order to maximize pt's functional independence and safety w/ mobility  Pt [facility]  Conversation with dtr reveals that she does not want her mother into a SNF STR if pt is unable to return to [facility]  She would rather pt be dc to her home & she will provide care for pt until she is able to return to [facility] (which is a [facility] with [facility])  Pt continues to be functioning below baseline level, and remains limited 2* factors listed above and including at risk for falls  PT will continue to see pt while here in order to address the deficits listed above and provide interventions consistent w/ POC in effort to achieve STGs  Recommend stair trial next session if tolerated  Barriers to Discharge Other (Comment)  (pt resident of [facility])   Goals   Patient Goals to return home   STG Expiration Date 10/16/19   Short Term Goal #1 STGs remain appropriate   PT Treatment Day 2   Plan   Treatment/Interventions Functional transfer training;LE strengthening/ROM; Therapeutic exercise; Endurance training;Elevations;Cognitive reorientation;Patient/family training;Equipment eval/education; Bed mobility;Gait training;Spoke to nursing;Spoke to advanced practitioner;Family   Progress Slow progress, decreased activity tolerance   PT Frequency   (3-5x/wk)   Recommendation   Recommendation Other (Comment)  (see below)   Equipment Recommended Walker  (RW)   PT - OK to Discharge No   Additional Comments If [facility] able to provide increased support, would recommend pt return to [facility] with PT services once able to clear 13 stairs  If unable - would need to consider STR  However, dtr does not want pt to go to a SNF STR, she would rather pt be d/c'd then to her home with her (which is [facility] with [address])         Erica Alvarado, PT, DPT    Time of PT treatment session: [phone]

## 2019-10-12 NOTE — PLAN OF CARE
Problem: PHYSICAL THERAPY ADULT  Goal: Performs mobility at highest level of function for planned discharge setting  See evaluation for individualized goals  Description  Treatment/Interventions: Functional transfer training, LE strengthening/ROM, Therapeutic exercise, Endurance training, Elevations, Cognitive reorientation, Patient/family training, Equipment eval/education, Bed mobility, Gait training, Spoke to nursing  Equipment Recommended: Hilda العراقي       See flowsheet documentation for full assessment, interventions and recommendations  Outcome: Progressing  Note:   Prognosis: Good  Problem List: Decreased strength, Decreased range of motion, Decreased endurance, Impaired balance, Decreased mobility, Decreased cognition, Decreased safety awareness, Impaired hearing  Assessment: Pt seen for PT treatment session this date with interventions consisting of gait training w/ emphasis on improving pt's ability to ambulate level surfaces x 30' x6 with min A provided by therapist with RW and Therapeutic exercise consisting of: AROM 10 reps B LE in sitting position  Pt agreeable to PT treatment session upon arrival, pt found supine in bed w/ HOB elevated, in no apparent distress, A&O x 1 and responsive  In comparison to previous session, pt with improvements in tolerance to seated ther ex without pain reported, continued household distance gait trials without overt LOB observed  Pt requiring frequent rest breaks throughout session 2* fatigue  SPO2 on 1 5L O2 NC remained in high 90s throughout session, pt denying SOB/GRAF  Post session: pt returned back to recliner, chair alarm engaged, all needs in reach and RN notified of session findings/recommendations and NSG staff educated/encouraged to mobilize/ambulate pt to tolerance- RN Bunny Valenzuela verbalizing understanding  Continue to recommend anticipated return to [facility] c PT services vs STR at time of d/c in order to maximize pt's functional independence and safety w/ mobility   Pt will need to clear 13 stairs prior to return to [facility]  Conversation with dtr reveals that she does not want her mother into a SNF STR if pt is unable to return to [facility]  She would rather pt be dc to her home & she will provide care for pt until she is able to return to [facility] (which is a [facility] with [facility])  Pt continues to be functioning below baseline level, and remains limited 2* factors listed above and including at risk for falls  PT will continue to see pt while here in order to address the deficits listed above and provide interventions consistent w/ POC in effort to achieve STGs  Recommend stair trial next session if tolerated  Barriers to Discharge: Other (Comment)(pt resident of [facility])     Recommendation: Other (Comment)(see below)     PT - OK to Discharge: No    See flowsheet documentation for full assessment

## 2019-10-12 NOTE — PROGRESS NOTES
Progress Note Marcela Amador 1945, 69 y o  female MRN: [id number]    Unit/Bed#: -01 Encounter: [id number]    Primary Care Provider: Greg Nelson DO   Date and time admitted to hospital: 10/3/2019  3:21 PM      * Acute on chronic diastolic congestive heart failure [facility])  Assessment & Plan  Wt Readings from Last 3 Encounters:   10/11/19 66 kg (145 lb 8 1 oz)   09/30/19 88 3 kg (194 lb 10 7 oz)   07/18/19 83 5 kg (184 lb)     · Acute on chronic diastolic congestive heart failure on admission  Report weight gain prior to admit  · Echo shows an EF of 45%, study was technically difficult to evaluate for diastolic dysfunction  · CXR showing cardiomegaly  Pro-BNP elevated 12k  Troponin <0 02   · Per cardiology, lasix was felt to no longer be effective and switched to bumex and ultimately bumex gtt, and now with TAM  All diuretics currently on hold, nephrology following  Will give gentle hydration today and monitor vitals  · Cardiology input appreciated, right heart catheterization 10/8  · Daily weights, strict I/Os, low sodium diet  · Monitor electrolytes: keep K+ >4 and Mg >2   · Hypokalemia 10/11, resolved    Atrial flutter (HCC)  Assessment & Plan  · Atrial fibrillation/flutter with RVR at times  · Cardiology input appreciated  On Metoprolol and transitioned from amiodarone drip to oral amiodarone, despite this patient remains in afib and cardiology has discontinued the amiodarone  · No acute events in 48 hrs, will d/c telemetry  On Coumadin 3 mg at night  · INR is 2 94 today - will give 1 mg Coumadin tonight and resume 3 mg tomorrow  · Daily INR ordered  Essential hypertension  Assessment & Plan  · Essential hypertension, continue metoprolol  Controlled  PAD (peripheral artery disease) (HCC)  Assessment & Plan  · Peripheral artery disease status post lower extremity stenting  No evidence of claudication at this time  · Continue Clopidogrel and Atorvastatin    · Patient was due to have a follow-up ultrasound to ensure stents are patent on Friday - will need to be rescheduled outpatient  Hypothyroidism  Assessment & Plan  · Hypothyroidism, continue Levothyroxine, normal TSH  Stage 3 chronic kidney disease (HCC)  Assessment & Plan  · CKD 3  Monitor Cr closely on IV diuretics, BUN/Cr up elevated  · TAM on CKD worsened with Bumex drip which is since discontinued  · Nephrology consult appreciated, will give gentle fluid bolus today due to appearing volume contracted  Results from last 7 days   Lab Units 10/12/19  0602 10/11/19  0519 10/09/19  0447 10/08/19  0538 10/07/19  0452 10/06/19  0856   BUN mg/dL 66* 57* 42* 40* 36* 26*   CREATININE mg/dL 2 72* 2 29* 1 78* 1 91* 1 83* 1 63*   EGFR ml/min/1 73sq m 17 21 28 26 27 31       Urinary retention  Assessment & Plan  · Chronic urinary retention with catheter  Was recently seen in the emergency department on 9/30/2019 where she was started on Ciprofloxacin  Urine culture at that time showed E coli  Completed 3 day course antibiotics  Hyperlipidemia  Assessment & Plan  · Hyperlipidemia  Continue Atorvastatin  History of cerebral aneurysm repair  Assessment & Plan  · History of cerebral aneurysm status post clipping in 1996  Subsequently has migraine headaches  Patient does follow up with [facility] and is maintained on Carbamazepine (as anti seizure) and Depakote (for mood stabilization)  · "bubble" sensation in the front of the head 10/8, unclear etiology; neuro exam nonfocal   · STAT CT head 10/8 showed no acute changes  Headache since resolved  · Depakote level low, Tegretol level normal     Dementia (HCC)  Assessment & Plan  · Dementia without behavioral disturbance  Patient does have underlying depression and mood currently stable on Wellbutrin and Lorazepam   Also takes Depakote for mood stabilization          VTE Pharmacologic Prophylaxis:   Pharmacologic: Warfarin (Coumadin)  Mechanical VTE Prophylaxis in Place: Yes    Patient Centered Rounds: I have performed bedside rounds with nursing staff today  Discussions with Specialists or Other Care Team Provider: cardiology, PT     Education and Discussions with Family / Patient: patient, daughter present at bedside     Time Spent for Care: 30 minutes  More than 50% of total time spent on counseling and coordination of care as described above  Current Length of Stay: 9 day(s)    Current Patient Status: Inpatient   Certification Statement: The patient will continue to require additional inpatient hospital stay due to monitor for improved renal functions    Discharge Plan: pending improved renal functions, diuretics on hold     Code Status: Level 1 - Full Code      Subjective:   Patient seen with her daughter at the bedside  Her daughter reports her mood appears to be a little bit better today, however she still expresses frustration that her mother is weaker than when she brought her in  She is concerned that her mother is not ambulating as much as she should be  Patient herself denies any chest pain or shortness of breath presently  Denies any feelings of hopelessness  She is having no belly pain presently, had some nausea early this morning but reports that is resolved  Objective:     Vitals:   Temp (24hrs), Av 9 °F (36 6 °C), Min:97 1 °F (36 2 °C), Max:98 5 °F (36 9 °C)    Temp:  [97 1 °F (36 2 °C)-98 5 °F (36 9 °C)] 97 1 °F (36 2 °C)  HR:  [] 101  Resp:  [17-19] 17  BP: ()/(52-70) 101/70  SpO2:  [86 %-98 %] 86 %  Body mass index is 26 61 kg/m²  Input and Output Summary (last 24 hours): Intake/Output Summary (Last 24 hours) at 10/12/2019 1008  Last data filed at 10/12/2019 0855  Gross per 24 hour   Intake 205 ml   Output 400 ml   Net -195 ml       Physical Exam:     Physical Exam   Constitutional: She appears well-developed and well-nourished  No distress     HENT:   Mouth/Throat: Oropharynx is clear and moist  Mucous membranes are pale  Cardiovascular: Normal rate, S1 normal, S2 normal and normal heart sounds  An irregularly irregular rhythm present  Pulmonary/Chest: Effort normal and breath sounds normal  No respiratory distress  She has no wheezes  She has no rales  Abdominal: Soft  Bowel sounds are normal  She exhibits no distension  There is no tenderness  Musculoskeletal: She exhibits no edema  Neurological: She is alert  No cranial nerve deficit  Patient is pleasantly disoriented, she knows her daughter and herself, she recalls meeting me and knows that she is in the hospital   Skin: Skin is dry  Psychiatric: She has a normal mood and affect  Her behavior is normal    She is more cheerful today   Nursing note and vitals reviewed  Additional Data:     Labs:    Results from last 7 days   Lab Units 10/12/19  0602   WBC Thousand/uL 6 56   HEMOGLOBIN g/dL 14 2   HEMATOCRIT % 43 1   PLATELETS Thousands/uL 195   NEUTROS PCT % 50   LYMPHS PCT % 32   MONOS PCT % 11   EOS PCT % 6     Results from last 7 days   Lab Units 10/12/19  0602   SODIUM mmol/L 144   POTASSIUM mmol/L 4 3   CHLORIDE mmol/L 101   CO2 mmol/L 34*   BUN mg/dL 66*   CREATININE mg/dL 2 72*   ANION GAP mmol/L 9   CALCIUM mg/dL 9 7   GLUCOSE RANDOM mg/dL 111     Results from last 7 days   Lab Units 10/12/19  0605   INR  2 94*     Results from last 7 days   Lab Units 10/06/19  0627 10/05/19  1811   POC GLUCOSE mg/dl 102 130                   * I Have Reviewed All Lab Data Listed Above  * Additional Pertinent Lab Tests Reviewed:  All Labs Within Last 24 Hours Reviewed    Imaging:    Imaging Reports Reviewed Today Include:   Imaging Personally Reviewed by Myself Includes:  Tele - afib, OK to discontinue    Recent Cultures (last 7 days):           Last 24 Hours Medication List:     Current Facility-Administered Medications:  acetaminophen 650 mg Oral Q6H PRN Duey Riding, DO   albuterol 2 puff Inhalation Q6H PRN Paolophilippe Cazares, DO   atorvastatin 10 mg Oral Daily Ettie Lukes, DO   buPROPion 150 mg Oral Daily Ettie Lukes, DO   butalbital-acetaminophen-caffeine 1 tablet Oral Q4H PRN Ettie Lukes, DO   carBAMazepine 100 mg Oral TID Ettie Lukes, DO   cholecalciferol 1,000 Units Oral Daily [facility], DO   clopidogrel 75 mg [facility] Daily [facility]   diltiazem 10 mg Intravenous Q4H PRN Ettie Lukes, DO   divalproex sodium 250 mg Oral BID [facility], DO   ferrous sulfate 325 mg Oral BID [facility], DO   fluticasone-vilanterol 1 puff Inhalation Daily [facility], DO   levothyroxine 88 mcg Oral Daily [facility], DO   LORazepam 0 5 mg Oral BID [facility], DO   metoprolol tartrate 50 mg Oral Q12H [address] Lyudmila Damian, DO   ondansetron 4 mg Intravenous Q4H PRN Ettatiana Lulesly, DO   phenol 1 spray Mouth/Throat Q2H PRN Azalia Rueda PA-C   saccharomyces boulardii 250 mg Oral BID [facility], DO   senna-docusate sodium 1 tablet Oral Daily PRN Ettatiana Sarabia, DO   sodium chloride 250 mL Intravenous Once Azalia Rueda PA-C   warfarin 1 mg Oral Once (warfarin) Azalia Rueda PA-C   [START ON 10/13/2019] warfarin 3 mg Oral Daily (warfarin) Rigo Rodriguez PA-C        Today, Patient Was Seen By: Rigo Rodriguez PA-C    ** Please Note: Dictation voice to text software may have been used in the creation of this document   **

## 2019-10-12 NOTE — PLAN OF CARE
Problem: Potential for Falls  Goal: Patient will remain free of falls  Description  INTERVENTIONS:  - Assess patient frequently for physical needs  -  Identify cognitive and physical deficits and behaviors that affect risk of falls  -  [place] fall precautions as indicated by assessment   - Educate patient/family on patient safety including physical limitations  - Instruct patient to call for assistance with activity based on assessment  - Modify environment to reduce risk of injury  - Consider OT/PT consult to assist with strengthening/mobility  Outcome: Progressing     Problem: Prexisting or High Potential for Compromised Skin Integrity  Goal: Skin integrity is maintained or improved  Description  INTERVENTIONS:  - Identify patients at risk for skin breakdown  - Assess and monitor skin integrity  - Assess and monitor nutrition and hydration status  - Monitor labs   - Assess for incontinence   - Turn and reposition patient  - Assist with mobility/ambulation  - Relieve pressure over bony prominences  - Avoid friction and shearing  - Provide appropriate hygiene as needed including keeping skin clean and dry  - Evaluate need for skin moisturizer/barrier cream  - Collaborate with interdisciplinary team   - Patient/family teaching  - Consider wound care consult   Outcome: Progressing     Problem: SAFETY ADULT  Goal: Patient will remain free of falls  Description  INTERVENTIONS:  - Assess patient frequently for physical needs  -  Identify cognitive and physical deficits and behaviors that affect risk of falls    -  [place] fall precautions as indicated by assessment   - Educate patient/family on patient safety including physical limitations  - Instruct patient to call for assistance with activity based on assessment  - Modify environment to reduce risk of injury  - Consider OT/PT consult to assist with strengthening/mobility  Outcome: Progressing  Goal: Maintain or return to baseline ADL function  Description  INTERVENTIONS:  -  Assess patient's ability to carry out ADLs; assess patient's baseline for ADL function and identify physical deficits which impact ability to perform ADLs (bathing, care of mouth/teeth, toileting, grooming, dressing, etc )  - Assess/evaluate cause of self-care deficits   - Assess range of motion  - Assess patient's mobility; develop plan if impaired  - Assess patient's need for assistive devices and provide as appropriate  - Encourage maximum independence but intervene and supervise when necessary  - Involve family in performance of ADLs  - Assess for home care needs following discharge   - Consider OT consult to assist with ADL evaluation and planning for discharge  - Provide patient education as appropriate  Outcome: Progressing  Goal: Maintain or return mobility status to optimal level  Description  INTERVENTIONS:  - Assess patient's baseline mobility status (ambulation, transfers, stairs, etc )    - Identify cognitive and physical deficits and behaviors that affect mobility  - Identify mobility aids required to assist with transfers and/or ambulation (gait belt, sit-to-stand, lift, walker, cane, etc )  - [place] fall precautions as indicated by assessment  - Record patient progress and toleration of activity level on Mobility SBAR; progress patient to next Phase/Stage  - Instruct patient to call for assistance with activity based on assessment  - Consider rehabilitation consult to assist with strengthening/weightbearing, etc   Outcome: Progressing     Problem: DISCHARGE PLANNING  Goal: Discharge to home or other facility with appropriate resources  Description  INTERVENTIONS:  - Identify barriers to discharge w/patient and caregiver  - Arrange for needed discharge resources and transportation as appropriate  - Identify discharge learning needs (meds, wound care, etc )  - Arrange for interpretive services to assist at discharge as needed  - Refer to Case Management Department for coordinating discharge planning if the patient needs post-hospital services based on physician/advanced practitioner order or complex needs related to functional status, cognitive ability, or social support system  Outcome: Progressing     Problem: Knowledge Deficit  Goal: Patient/family/caregiver demonstrates understanding of disease process, treatment plan, medications, and discharge instructions  Description  Complete learning assessment and assess knowledge base    Interventions:  - Provide teaching at level of understanding  - Provide teaching via preferred learning methods  Outcome: Progressing     Problem: CARDIOVASCULAR - ADULT  Goal: Maintains optimal cardiac output and hemodynamic stability  Description  INTERVENTIONS:  - Monitor I/O, vital signs and rhythm  - Monitor for S/S and trends of decreased cardiac output  - Administer and titrate ordered vasoactive medications to optimize hemodynamic stability  - Assess quality of pulses, skin color and temperature  - Assess for signs of decreased coronary artery perfusion  - Instruct patient to report change in severity of symptoms  Outcome: Progressing  Goal: Absence of cardiac dysrhythmias or at baseline rhythm  Description  INTERVENTIONS:  - Continuous cardiac monitoring, vital signs, obtain 12 lead EKG if ordered  - Administer antiarrhythmic and heart rate control medications as ordered  - Monitor electrolytes and administer replacement therapy as ordered  Outcome: Progressing     Problem: GENITOURINARY - ADULT  Goal: Maintains or returns to baseline urinary function  Description  INTERVENTIONS:  - Assess urinary function  - Encourage oral fluids to ensure adequate hydration if ordered  - Administer IV fluids as ordered to ensure adequate hydration  - Administer ordered medications as needed  - Offer frequent toileting  - Follow urinary retention protocol if ordered  Outcome: Progressing  Goal: Absence of urinary retention  Description  INTERVENTIONS:  - Assess patients ability to void and empty bladder  - Monitor I/O  - Bladder scan as needed  - Discuss with physician/AP medications to alleviate retention as needed  - Discuss catheterization for long term situations as appropriate  Outcome: Progressing  Goal: Urinary catheter remains patent  Description  INTERVENTIONS:  - Assess patency of urinary catheter  - If patient has a chronic baca, consider changing catheter if non-functioning  - Follow guidelines for intermittent irrigation of non-functioning urinary catheter  Outcome: Progressing     Problem: MUSCULOSKELETAL - ADULT  Goal: Maintain or return mobility to safest level of function  Description  INTERVENTIONS:  - Assess patient's ability to carry out ADLs; assess patient's baseline for ADL function and identify physical deficits which impact ability to perform ADLs (bathing, care of mouth/teeth, toileting, grooming, dressing, etc )  - Assess/evaluate cause of self-care deficits   - Assess range of motion  - Assess patient's mobility  - Assess patient's need for assistive devices and provide as appropriate  - Encourage maximum independence but intervene and supervise when necessary  - Involve family in performance of ADLs  - Assess for home care needs following discharge   - Consider OT consult to assist with ADL evaluation and planning for discharge  - Provide patient education as appropriate  Outcome: Progressing

## 2019-10-13 LAB
ANION GAP SERPL CALCULATED.3IONS-SCNC: 10 MMOL/L (ref 4–13)
BASOPHILS # BLD AUTO: 0.04 THOUSANDS/ΜL (ref 0–0.1)
BASOPHILS NFR BLD AUTO: 1 % (ref 0–1)
BUN SERPL-MCNC: 72 MG/DL (ref 5–25)
CALCIUM SERPL-MCNC: 9.8 MG/DL (ref 8.3–10.1)
CHLORIDE SERPL-SCNC: 102 MMOL/L (ref 100–108)
CO2 SERPL-SCNC: 31 MMOL/L (ref 21–32)
CREAT SERPL-MCNC: 2.45 MG/DL (ref 0.6–1.3)
EOSINOPHIL # BLD AUTO: 0.33 THOUSAND/ΜL (ref 0–0.61)
EOSINOPHIL NFR BLD AUTO: 6 % (ref 0–6)
ERYTHROCYTE [DISTWIDTH] IN BLOOD BY AUTOMATED COUNT: 12.5 % (ref 11.6–15.1)
GFR SERPL CREATININE-BSD FRML MDRD: 19 ML/MIN/1.73SQ M
GLUCOSE SERPL-MCNC: 115 MG/DL (ref 65–140)
HCT VFR BLD AUTO: 39.7 % (ref 34.8–46.1)
HGB BLD-MCNC: 13.1 G/DL (ref 11.5–15.4)
IMM GRANULOCYTES # BLD AUTO: 0.03 THOUSAND/UL (ref 0–0.2)
IMM GRANULOCYTES NFR BLD AUTO: 1 % (ref 0–2)
INR PPP: 2.99 (ref 0.84–1.19)
LYMPHOCYTES # BLD AUTO: 1.87 THOUSANDS/ΜL (ref 0.6–4.47)
LYMPHOCYTES NFR BLD AUTO: 33 % (ref 14–44)
MCH RBC QN AUTO: 34 PG (ref 26.8–34.3)
MCHC RBC AUTO-ENTMCNC: 33 G/DL (ref 31.4–37.4)
MCV RBC AUTO: 103 FL (ref 82–98)
MONOCYTES # BLD AUTO: 0.62 THOUSAND/ΜL (ref 0.17–1.22)
MONOCYTES NFR BLD AUTO: 11 % (ref 4–12)
NEUTROPHILS # BLD AUTO: 2.73 THOUSANDS/ΜL (ref 1.85–7.62)
NEUTS SEG NFR BLD AUTO: 48 % (ref 43–75)
NRBC BLD AUTO-RTO: 0 /100 WBCS
PLATELET # BLD AUTO: 177 THOUSANDS/UL (ref 149–390)
PMV BLD AUTO: 12.1 FL (ref 8.9–12.7)
POTASSIUM SERPL-SCNC: 3.9 MMOL/L (ref 3.5–5.3)
PROTHROMBIN TIME: 31.5 SECONDS (ref 11.6–14.5)
RBC # BLD AUTO: 3.85 MILLION/UL (ref 3.81–5.12)
SODIUM SERPL-SCNC: 143 MMOL/L (ref 136–145)
WBC # BLD AUTO: 5.62 THOUSAND/UL (ref 4.31–10.16)

## 2019-10-13 PROCEDURE — 85610 PROTHROMBIN TIME: CPT | Performed by: PHYSICIAN ASSISTANT

## 2019-10-13 PROCEDURE — 99232 SBSQ HOSP IP/OBS MODERATE 35: CPT | Performed by: INTERNAL MEDICINE

## 2019-10-13 PROCEDURE — 99232 SBSQ HOSP IP/OBS MODERATE 35: CPT | Performed by: PHYSICIAN ASSISTANT

## 2019-10-13 PROCEDURE — 80048 BASIC METABOLIC PNL TOTAL CA: CPT | Performed by: INTERNAL MEDICINE

## 2019-10-13 PROCEDURE — 99233 SBSQ HOSP IP/OBS HIGH 50: CPT | Performed by: INTERNAL MEDICINE

## 2019-10-13 PROCEDURE — 85025 COMPLETE CBC W/AUTO DIFF WBC: CPT | Performed by: INTERNAL MEDICINE

## 2019-10-13 RX ORDER — WARFARIN SODIUM 3 MG/1
3 TABLET ORAL
Status: DISCONTINUED | OUTPATIENT
Start: 2019-10-14 | End: 2019-10-15 | Stop reason: HOSPADM

## 2019-10-13 RX ORDER — WARFARIN SODIUM 2 MG/1
1 TABLET ORAL
Status: COMPLETED | OUTPATIENT
Start: 2019-10-13 | End: 2019-10-13

## 2019-10-13 RX ADMIN — WARFARIN SODIUM 1 MG: 2 TABLET ORAL at 17:53

## 2019-10-13 RX ADMIN — LEVOTHYROXINE SODIUM 88 MCG: 88 TABLET ORAL at 05:42

## 2019-10-13 RX ADMIN — CARBAMAZEPINE 100 MG: 100 TABLET, CHEWABLE ORAL at 09:06

## 2019-10-13 RX ADMIN — VITAMIN D, TAB 1000IU (100/BT) 1000 UNITS: 25 TAB at 09:06

## 2019-10-13 RX ADMIN — CLOPIDOGREL BISULFATE 75 MG: 75 TABLET ORAL at 09:06

## 2019-10-13 RX ADMIN — LORAZEPAM 0.5 MG: 0.5 TABLET ORAL at 17:53

## 2019-10-13 RX ADMIN — DIVALPROEX SODIUM 250 MG: 250 TABLET, FILM COATED, EXTENDED RELEASE ORAL at 17:53

## 2019-10-13 RX ADMIN — CARBAMAZEPINE 100 MG: 100 TABLET, CHEWABLE ORAL at 15:19

## 2019-10-13 RX ADMIN — Medication 250 MG: at 09:06

## 2019-10-13 RX ADMIN — FERROUS SULFATE TAB 325 MG (65 MG ELEMENTAL FE) 325 MG: 325 (65 FE) TAB at 09:06

## 2019-10-13 RX ADMIN — METOPROLOL TARTRATE 50 MG: 50 TABLET, FILM COATED ORAL at 20:07

## 2019-10-13 RX ADMIN — METOPROLOL TARTRATE 50 MG: 50 TABLET, FILM COATED ORAL at 09:06

## 2019-10-13 RX ADMIN — FLUTICASONE FUROATE AND VILANTEROL TRIFENATATE 1 PUFF: 100; 25 POWDER RESPIRATORY (INHALATION) at 09:06

## 2019-10-13 RX ADMIN — ATORVASTATIN CALCIUM 10 MG: 10 TABLET, FILM COATED ORAL at 09:06

## 2019-10-13 RX ADMIN — LORAZEPAM 0.5 MG: 0.5 TABLET ORAL at 09:06

## 2019-10-13 RX ADMIN — FERROUS SULFATE TAB 325 MG (65 MG ELEMENTAL FE) 325 MG: 325 (65 FE) TAB at 17:53

## 2019-10-13 RX ADMIN — Medication 250 MG: at 17:53

## 2019-10-13 RX ADMIN — SENNOSIDES AND DOCUSATE SODIUM 1 TABLET: 8.6; 5 TABLET ORAL at 17:53

## 2019-10-13 RX ADMIN — BUPROPION HYDROCHLORIDE 150 MG: 150 TABLET, FILM COATED, EXTENDED RELEASE ORAL at 09:06

## 2019-10-13 RX ADMIN — DIVALPROEX SODIUM 250 MG: 250 TABLET, FILM COATED, EXTENDED RELEASE ORAL at 09:06

## 2019-10-13 RX ADMIN — CARBAMAZEPINE 100 MG: 100 TABLET, CHEWABLE ORAL at 20:07

## 2019-10-13 RX ADMIN — SENNOSIDES AND DOCUSATE SODIUM 1 TABLET: 8.6; 5 TABLET ORAL at 09:06

## 2019-10-13 NOTE — ASSESSMENT & PLAN NOTE
· TAM on CKD worsened with Bumex drip which is since discontinued  · Nephrology consult appreciated, received gentle fluids yesterday - Cr from 2 72 to 2 45 today    Continue to hold diuretics and recheck BMP in am     Results from last 7 days   Lab Units 10/13/19  0509 10/12/19  0602 10/11/19  0519 10/09/19  0447 10/08/19  0538 10/07/19  0452   BUN mg/dL 72* 66* 57* 42* 40* 36*   CREATININE mg/dL 2 45* 2 72* 2 29* 1 78* 1 91* 1 83*   EGFR ml/min/1 73sq m 19 17 21 28 26 27

## 2019-10-13 NOTE — PROGRESS NOTES
NEPHROLOGY PROGRESS NOTE    Patient: Mayela Morrissey               Sex: female          DOA: 10/3/2019  3:21 PM   Date of Birth: @        Age: @        LOS:  LOS: 10 days   10/13/2019    REASON FOR THE CONSULTATION:  Further management of TAM  HPI     This is a 69 y o  female admitted for Acute on chronic diastolic congestive heart failure ([facility], [place] [address] )     SUBJECTIVE     - breathing has remained stable overnight and patient was also found to be nonoliguric   Reviewed cardiologist note with the recommendations  Patient denies nausea, vomiting, headache or dizziness today    - Reviewed last 24 hrs events     CURRENT MEDICATIONS       Current Facility-Administered Medications:     acetaminophen (TYLENOL) tablet 650 mg, 650 mg, Oral, Q6H PRN, Paolo Sage, DO, 650 mg at 10/10/19 1136    albuterol (PROVENTIL HFA,VENTOLIN HFA) inhaler 2 puff, 2 puff, Inhalation, Q6H PRN, [place], DO    atorvastatin (LIPITOR) tablet 10 mg, 10 mg, Oral, Daily, Paolo Cazares, DO, 10 mg at 10/13/19 0906    buPROPion (WELLBUTRIN SR) 12 hr tablet 150 mg, 150 mg, Oral, Daily, Paloo Sage, DO, 150 mg at 10/13/19 0906    butalbital-acetaminophen-caffeine (FIORICET,ESGIC) -40 mg per tablet 1 tablet, 1 tablet, Oral, Q4H PRN, [place], DO    carBAMazepine (TEGretol) chewable tablet 100 mg, 100 mg, Oral, TID, Paolo Cazares, DO, 100 mg at 10/13/19 0906    cholecalciferol (VITAMIN D3) tablet 1,000 Units, 1,000 Units, Oral, Daily, [place], DO, 1,000 Units at 10/13/19 0906    clopidogrel (PLAVIX) tablet 75 mg, 75 mg, Oral, Daily, Paolo Sage, DO, 75 mg at 10/13/19 0906    diltiazem (CARDIZEM) injection 10 mg, 10 mg, Intravenous, Q4H PRN, [place], DO    divalproex sodium (DEPAKOTE ER) 24 hr tablet 250 mg, 250 mg, Oral, BID, Paolo Cazares, DO, 250 mg at 10/13/19 0906    ferrous sulfate tablet 325 mg, 325 mg, Oral, BID, Paolo Cazares DO, 325 mg at 10/13/19 0906    fluticasone-vilanterol (BREO ELLIPTA) 100-25 mcg/inh inhaler 1 puff, 1 puff, Inhalation, Daily, Paolo Cazares, DO, 1 puff at 10/13/19 0906    levothyroxine tablet 88 mcg, 88 mcg, Oral, Daily, Paolo Sage, DO, 88 mcg at 10/13/19 0542    LORazepam (ATIVAN) tablet 0 5 mg, 0 5 mg, Oral, BID, Paolo Sage, DO, 0 5 mg at 10/13/19 0906    metoprolol tartrate (LOPRESSOR) tablet 50 mg, 50 mg, Oral, Q12H [address], John Nims, DO, 50 mg at 10/13/19 0906    ondansetron (ZOFRAN) injection 4 mg, 4 mg, Intravenous, Q4H PRN, Tod Cava, DO, 4 mg at 10/12/19 0927    phenol (CHLORASEPTIC) 1 4 % mucosal liquid 1 spray, 1 spray, Mouth/Throat, Q2H PRN, Azalia Rueda PA-C, 1 spray at 10/11/19 1732    saccharomyces boulardii (FLORASTOR) capsule 250 mg, 250 mg, Oral, BID, Paolo Cazares, DO, 250 mg at 10/13/19 0906    senna-docusate sodium (SENOKOT S) 8 6-50 mg per tablet 1 tablet, 1 tablet, Oral, BID, Azalia Rueda PA-C, 1 tablet at 10/13/19 0906    warfarin (COUMADIN) tablet 3 mg, 3 mg, Oral, Daily (warfarin), Adilia Carter PA-C    OBJECTIVE     Current Weight: Weight - Scale: 66 kg (145 lb 8 1 oz)  Vitals:    10/13/19 0718   BP: 104/65   Pulse: 101   Resp: 18   Temp: 98 3 °F (36 8 °C)   SpO2: 91%     Body mass index is 26 61 kg/m²  Intake/Output Summary (Last 24 hours) at 10/13/2019 1154  Last data filed at 10/13/2019 0900  Gross per 24 hour   Intake 720 ml   Output 1100 ml   Net -380 ml       PHYSICAL EXAMINATION     Physical Exam   Constitutional: No distress  HENT:   Head: Normocephalic and atraumatic  Eyes: No scleral icterus  Neck: Neck supple  No JVD present  Cardiovascular: Normal rate  Pulmonary/Chest: No accessory muscle usage  No respiratory distress  She has no wheezes  Abdominal: Soft  She exhibits no distension  Musculoskeletal: She exhibits no edema  Right hand: She exhibits no laceration  Left hand: She exhibits no laceration  Neurological: She is alert  Skin: Skin is warm  No cyanosis     Psychiatric: She is not combative  She expresses no suicidal ideation  LAB RESULTS     Results from last 7 days   Lab Units 10/13/19  0509 10/12/19  0602 10/11/19  0519 10/09/19  0447 10/08/19  0538 10/07/19  0452   WBC Thousand/uL 5 62 6 56 6 37 5 94  --  6 18   HEMOGLOBIN g/dL 13 1 14 2 13 7 12 9  --  12 3   HEMATOCRIT % 39 7 43 1 41 2 39 3  --  37 8   PLATELETS Thousands/uL 177 195 179 175  --  170   SODIUM mmol/L 143 144 145 142 145 144   POTASSIUM mmol/L 3 9 4 3 3 0* 3 9 3 4* 3 8   CHLORIDE mmol/L 102 101 100 104 102 105   CO2 mmol/L 31 34* 34* 30 31 30   BUN mg/dL 72* 66* 57* 42* 40* 36*   CREATININE mg/dL 2 45* 2 72* 2 29* 1 78* 1 91* 1 83*   EGFR ml/min/1 73sq m 19 17 21 28 26 27   CALCIUM mg/dL 9 8 9 7 9 8 9 6 9 6 9 5   MAGNESIUM mg/dL  --   --  2 3  --  2 1 2 1       I have personally reviewed the old medical records and patient's previously known baseline creatinine level is ~ 1 4-1 6    RADIOLOGY RESULTS      CT head wo contrast   Final Result by Luis Vaughn MD (10/08 1333)      Chronic intracranial changes identified  No acute intracranial abnormality                  Workstation performed: [id number]         XR chest 2 views   ED Interpretation by Claudean Sine, PA-C (10/03 0309)   Cardiomegaly with pulmonary vascular congestion      Final Result by Opal Fong MD (10/04 3243)      Cardiomegaly  Minimal prominence of the pulmonary vessels  Workstation performed: [id number]             PLAN / RECOMMENDATIONS      1  TAM on top of CKD stage 3  Urine lytes were prerenal in nature suggested TAM was most likely secondary to intravascular volume depletion in the setting of use of diuretics  Patient has received 250 mL of fluid bolus yesterday and also did not receive any diuretics    Upon review of old medical records, patient's previously known baseline creatinine is around 1 4-1 6 also patient has solitary kidney and overnight patient has remained nonoliguric and renal function has improved to current creatinine of 2 45  Clinically patient is not in volume overload state and will continue to hold diuretics for time being and monitor renal function with AM labs  2  Hypertension in chronic kidney disease  Overnight blood pressure has remained under good control and plan to monitor hypertension with metoprolol 50 mg PO BID today  3  Azotemia  Multifactorial and suspected due to TAM  Overnight BUN level has worsened to 72  Diuretics are currently on hold  Patient has remained asymptomatic from azotemia perspective  Hold diuretics for time being and recheck BUN level with AM labs  Overall above mentioned plan was also d/w Sarwat Oliver MD  Nephrology  10/13/2019        Portions of the record may have been created with voice recognition software  Occasional wrong word or "sound a like" substitutions may have occurred due to the inherent limitations of voice recognition software  Read the chart carefully and recognize, using context, where substitutions have occurred

## 2019-10-13 NOTE — PROGRESS NOTES
Progress Note Cassie Ann 1945, 69 y o  female MRN: [id number]    Unit/Bed#: -01 Encounter: [id number]    Primary Care Provider: Elmira Montalvo DO   Date and time admitted to hospital: 10/3/2019  3:21 PM        * Acute on chronic diastolic congestive heart failure [facility])  Assessment & Plan  Wt Readings from Last 3 Encounters:   10/11/19 66 kg (145 lb 8 1 oz)   09/30/19 88 3 kg (194 lb 10 7 oz)   07/18/19 83 5 kg (184 lb)     · Acute on chronic diastolic congestive heart failure on admission  Report weight gain prior to admit  · Echo shows an EF of 45%, study was technically difficult to evaluate for diastolic dysfunction  · CXR showing cardiomegaly  Pro-BNP elevated 12k  Troponin <0 02   · Per Cardiology, Lasix was felt to no longer be effective during the admission and switched to Bumex and ultimately Bumex gtt, and developed TAM  All diuretics currently on hold, Nephrology following  Given gentle hydration yesterday with some improvement in Cr  · Cardiology input appreciated, right heart catheterization 10/8  · Daily weights, strict I/Os, low sodium diet  · Monitor electrolytes: keep K+ >4 and Mg >2   · Hypokalemia 10/11, resolved  · Diuretics currently remain on hold with plan to repeat BMP in am   Per Cardiology, when Cr improved and cleared by Nephrology - plan to resume home Lasix 40mg dose  Stage 3 chronic kidney disease ([facility] [place] [address] )  Assessment & Plan  · TAM on CKD worsened with Bumex drip which is since discontinued  · Nephrology consult appreciated, received gentle fluids yesterday - Cr from 2 72 to 2 45 today    Continue to hold diuretics and recheck BMP in am     Results from last 7 days   Lab Units 10/13/19  0509 10/12/19  0602 10/11/19  0519 10/09/19  0447 10/08/19  0538 10/07/19  0452   BUN mg/dL 72* 66* 57* 42* 40* 36*   CREATININE mg/dL 2 45* 2 72* 2 29* 1 78* 1 91* 1 83*   EGFR ml/min/1 73sq m 19 17 21 28 26 27       Atrial flutter (HCC)  Assessment & Plan  · Atrial fibrillation/flutter with RVR at times  · Cardiology input appreciated  On Metoprolol and transitioned from amiodarone drip to oral amiodarone, despite this patient remains in afib and cardiology has discontinued the amiodarone  · No acute events in 48 hrs and telemetry was discontinued  On Coumadin 3 mg at night  · INR is 2 99 today - will give 1 mg Coumadin tonight and resume 3 mg tomorrow  · Daily INR ordered  Urinary retention  Assessment & Plan  · Chronic urinary retention with catheter  Was recently seen in the emergency department on 9/30/2019 where she was started on Ciprofloxacin  Urine culture at that time showed E coli  Completed 3 day course antibiotics  History of cerebral aneurysm repair  Assessment & Plan  · History of cerebral aneurysm status post clipping in 1996  Subsequently has migraine headaches  Patient does follow up with [address] [place] [address] Neurology and is maintained on Carbamazepine (as anti seizure) and Depakote (for mood stabilization)  · "bubble" sensation in the front of the head 10/8, unclear etiology; neuro exam nonfocal   · STAT CT head 10/8 showed no acute changes  Headache since resolved  · Depakote level low, Tegretol level normal     PAD (peripheral artery disease) ([facility])  Assessment & Plan  · Peripheral artery disease status post lower extremity stenting  No evidence of claudication at this time  · Continue Clopidogrel and Atorvastatin  · Patient was due to have a follow-up ultrasound to ensure stents are patent on Friday - will need to be rescheduled outpatient  Hypothyroidism  Assessment & Plan  · Hypothyroidism, continue Levothyroxine, normal TSH  Essential hypertension  Assessment & Plan  · Essential hypertension, continue metoprolol  Controlled  Hyperlipidemia  Assessment & Plan  · Hyperlipidemia  Continue Atorvastatin  Dementia [facility])  Assessment & Plan  · Dementia without behavioral disturbance    Patient does have underlying depression and mood currently stable on Wellbutrin and Lorazepam   Also takes Depakote for mood stabilization  VTE Pharmacologic Prophylaxis:   Pharmacologic: Warfarin (Coumadin)  Mechanical VTE Prophylaxis in Place: Yes    Patient Centered Rounds: Discussed with nurse outside of patient's room  Discussions with Specialists or Other Care Team Provider: Discussed with Nephrology  Appreciated Cardiology input  Education and Discussions with Family / Patient: Patient  Time Spent for Care: 30 minutes  More than 50% of total time spent on counseling and coordination of care as described above  Current Length of Stay: 10 day(s)    Current Patient Status: Inpatient   Certification Statement: The patient will continue to require additional inpatient hospital stay due to treatment of CHF and TAM  Discharge Plan: Not medically cleared  Code Status: Level 1 - Full Code      Subjective:   Patient denies CP or SOB  No abdominal pain  No nausea or vomiting  Objective:     Vitals:   Temp (24hrs), Av 3 °F (36 8 °C), Min:98 2 °F (36 8 °C), Max:98 4 °F (36 9 °C)    Temp:  [98 2 °F (36 8 °C)-98 4 °F (36 9 °C)] 98 3 °F (36 8 °C)  HR:  [] 101  Resp:  [18-20] 18  BP: (104-127)/(65-73) 104/65  SpO2:  [91 %-96 %] 91 %  Body mass index is 26 61 kg/m²  Input and Output Summary (last 24 hours): Intake/Output Summary (Last 24 hours) at 10/13/2019 1301  Last data filed at 10/13/2019 0900  Gross per 24 hour   Intake 360 ml   Output 1100 ml   Net -740 ml       Physical Exam:     Physical Exam   Constitutional: No distress  HENT:   Head: Normocephalic and atraumatic  Eyes: No scleral icterus  Cardiovascular:   +S1S2, irregularly irregular  Pulmonary/Chest: Effort normal and breath sounds normal  No respiratory distress  She has no wheezes  Abdominal: Soft  Bowel sounds are normal  She exhibits no distension  There is no tenderness  Musculoskeletal: She exhibits no edema  Neurological: She is alert  Skin: Skin is warm and dry  She is not diaphoretic  Vitals reviewed  Additional Data:     Labs:    Results from last 7 days   Lab Units 10/13/19  0509   WBC Thousand/uL 5 62   HEMOGLOBIN g/dL 13 1   HEMATOCRIT % 39 7   PLATELETS Thousands/uL 177   NEUTROS PCT % 48   LYMPHS PCT % 33   MONOS PCT % 11   EOS PCT % 6     Results from last 7 days   Lab Units 10/13/19  0509   POTASSIUM mmol/L 3 9   CHLORIDE mmol/L 102   CO2 mmol/L 31   BUN mg/dL 72*   CREATININE mg/dL 2 45*   CALCIUM mg/dL 9 8     Results from last 7 days   Lab Units 10/13/19  0509   INR  2 99*       * I Have Reviewed All Lab Data Listed Above  * Additional Pertinent Lab Tests Reviewed: All Labs Within Last 24 Hours Reviewed    Imaging:    Imaging Reports Reviewed Today Include: No new imaging      Recent Cultures (last 7 days):           Last 24 Hours Medication List:     Current Facility-Administered Medications:  acetaminophen 650 mg Oral Q6H PRN Alek Ruiz, DO   albuterol 2 puff Inhalation Q6H PRN Alek Ruiz, DO   atorvastatin 10 mg Oral Daily Paolo Cazares, DO   buPROPion 150 mg Oral Daily Paolo Cazares, DO   butalbital-acetaminophen-caffeine 1 tablet Oral Q4H PRN Alek Ruiz, DO   carBAMazepine 100 mg Oral TID Alek Ruiz, DO   cholecalciferol 1,000 Units Oral Daily Paolo Cazares, DO   clopidogrel 75 mg Oral Daily Paolo Cazares, DO   diltiazem 10 mg Intravenous Q4H PRN Alek Ruiz, DO   divalproex sodium 250 mg Oral BID Paolo Cazares, DO   ferrous sulfate 325 mg Oral BID Alek Ruiz, DO   fluticasone-vilanterol 1 puff Inhalation Daily Alek Ruiz, DO   levothyroxine 88 mcg Oral Daily Paolo Cazares, DO   LORazepam 0 5 mg Oral BID Alek Ruiz, DO   metoprolol tartrate 50 mg Oral Q12H [facility] Charlotte Andrade, DO   ondansetron 4 mg Intravenous Q4H PRN Alek Ruiz, DO   phenol 1 spray Mouth/Throat Q2H PRN Azalia Rueda PA-C   saccharomyces boulardii 250 mg Oral BID Paolo Cazares, DO   senna-docusate sodium 1 tablet Oral BID Francis Gibson PA-C   warfarin 1 mg Oral Once (warfarin) Rachael Stroud PA-C   [START ON 10/14/2019] warfarin 3 mg Oral Daily (warfarin) Rachael Stroud PA-C        Today, Patient Was Seen By: Rachael Stroud PA-C    ** Please Note: Dictation voice to text software may have been used in the creation of this document   **

## 2019-10-13 NOTE — ASSESSMENT & PLAN NOTE
Wt Readings from Last 3 Encounters:   10/11/19 66 kg (145 lb 8 1 oz)   09/30/19 88 3 kg (194 lb 10 7 oz)   07/18/19 83 5 kg (184 lb)     · Acute on chronic diastolic congestive heart failure on admission  Report weight gain prior to admit  · Echo shows an EF of 45%, study was technically difficult to evaluate for diastolic dysfunction  · CXR showing cardiomegaly  Pro-BNP elevated 12k  Troponin <0 02   · Per Cardiology, Lasix was felt to no longer be effective during the admission and switched to Bumex and ultimately Bumex gtt, and developed TAM  All diuretics currently on hold, Nephrology following  Given gentle hydration yesterday with some improvement in Cr  · Cardiology input appreciated, right heart catheterization 10/8  · Daily weights, strict I/Os, low sodium diet  · Monitor electrolytes: keep K+ >4 and Mg >2   · Hypokalemia 10/11, resolved  · Diuretics currently remain on hold with plan to repeat BMP in am   Per Cardiology, when Cr improved and cleared by Nephrology - plan to resume home Lasix 40mg dose

## 2019-10-13 NOTE — ASSESSMENT & PLAN NOTE
· History of cerebral aneurysm status post clipping in 1996  Subsequently has migraine headaches  Patient does follow up with [facility] and is maintained on Carbamazepine (as anti seizure) and Depakote (for mood stabilization)  · "bubble" sensation in the front of the head 10/8, unclear etiology; neuro exam nonfocal   · STAT CT head 10/8 showed no acute changes  Headache since resolved    · Depakote level low, Tegretol level normal

## 2019-10-13 NOTE — ASSESSMENT & PLAN NOTE
· Atrial fibrillation/flutter with RVR at times  · Cardiology input appreciated  On Metoprolol and transitioned from amiodarone drip to oral amiodarone, despite this patient remains in afib and cardiology has discontinued the amiodarone  · No acute events in 48 hrs and telemetry was discontinued  On Coumadin 3 mg at night  · INR is 2 99 today - will give 1 mg Coumadin tonight and resume 3 mg tomorrow  · Daily INR ordered

## 2019-10-13 NOTE — PLAN OF CARE
Problem: Potential for Falls  Goal: Patient will remain free of falls  Description  INTERVENTIONS:  - Assess patient frequently for physical needs  -  Identify cognitive and physical deficits and behaviors that affect risk of falls  -  [place] fall precautions as indicated by assessment   - Educate patient/family on patient safety including physical limitations  - Instruct patient to call for assistance with activity based on assessment  - Modify environment to reduce risk of injury  - Consider OT/PT consult to assist with strengthening/mobility  Outcome: Progressing     Problem: Prexisting or High Potential for Compromised Skin Integrity  Goal: Skin integrity is maintained or improved  Description  INTERVENTIONS:  - Identify patients at risk for skin breakdown  - Assess and monitor skin integrity  - Assess and monitor nutrition and hydration status  - Monitor labs   - Assess for incontinence   - Turn and reposition patient  - Assist with mobility/ambulation  - Relieve pressure over bony prominences  - Avoid friction and shearing  - Provide appropriate hygiene as needed including keeping skin clean and dry  - Evaluate need for skin moisturizer/barrier cream  - Collaborate with interdisciplinary team   - Patient/family teaching  - Consider wound care consult   Outcome: Progressing     Problem: SAFETY ADULT  Goal: Patient will remain free of falls  Description  INTERVENTIONS:  - Assess patient frequently for physical needs  -  Identify cognitive and physical deficits and behaviors that affect risk of falls    -  [place] fall precautions as indicated by assessment   - Educate patient/family on patient safety including physical limitations  - Instruct patient to call for assistance with activity based on assessment  - Modify environment to reduce risk of injury  - Consider OT/PT consult to assist with strengthening/mobility  Outcome: Progressing  Goal: Maintain or return to baseline ADL function  Description  INTERVENTIONS:  -  Assess patient's ability to carry out ADLs; assess patient's baseline for ADL function and identify physical deficits which impact ability to perform ADLs (bathing, care of mouth/teeth, toileting, grooming, dressing, etc )  - Assess/evaluate cause of self-care deficits   - Assess range of motion  - Assess patient's mobility; develop plan if impaired  - Assess patient's need for assistive devices and provide as appropriate  - Encourage maximum independence but intervene and supervise when necessary  - Involve family in performance of ADLs  - Assess for home care needs following discharge   - Consider OT consult to assist with ADL evaluation and planning for discharge  - Provide patient education as appropriate  Outcome: Progressing  Goal: Maintain or return mobility status to optimal level  Description  INTERVENTIONS:  - Assess patient's baseline mobility status (ambulation, transfers, stairs, etc )    - Identify cognitive and physical deficits and behaviors that affect mobility  - Identify mobility aids required to assist with transfers and/or ambulation (gait belt, sit-to-stand, lift, walker, cane, etc )  - [place] fall precautions as indicated by assessment  - Record patient progress and toleration of activity level on Mobility SBAR; progress patient to next Phase/Stage  - Instruct patient to call for assistance with activity based on assessment  - Consider rehabilitation consult to assist with strengthening/weightbearing, etc   Outcome: Progressing     Problem: DISCHARGE PLANNING  Goal: Discharge to home or other facility with appropriate resources  Description  INTERVENTIONS:  - Identify barriers to discharge w/patient and caregiver  - Arrange for needed discharge resources and transportation as appropriate  - Identify discharge learning needs (meds, wound care, etc )  - Arrange for interpretive services to assist at discharge as needed  - Refer to Case Management Department for coordinating discharge planning if the patient needs post-hospital services based on physician/advanced practitioner order or complex needs related to functional status, cognitive ability, or social support system  Outcome: Progressing     Problem: Knowledge Deficit  Goal: Patient/family/caregiver demonstrates understanding of disease process, treatment plan, medications, and discharge instructions  Description  Complete learning assessment and assess knowledge base    Interventions:  - Provide teaching at level of understanding  - Provide teaching via preferred learning methods  Outcome: Progressing     Problem: CARDIOVASCULAR - ADULT  Goal: Maintains optimal cardiac output and hemodynamic stability  Description  INTERVENTIONS:  - Monitor I/O, vital signs and rhythm  - Monitor for S/S and trends of decreased cardiac output  - Administer and titrate ordered vasoactive medications to optimize hemodynamic stability  - Assess quality of pulses, skin color and temperature  - Assess for signs of decreased coronary artery perfusion  - Instruct patient to report change in severity of symptoms  Outcome: Progressing  Goal: Absence of cardiac dysrhythmias or at baseline rhythm  Description  INTERVENTIONS:  - Continuous cardiac monitoring, vital signs, obtain 12 lead EKG if ordered  - Administer antiarrhythmic and heart rate control medications as ordered  - Monitor electrolytes and administer replacement therapy as ordered  Outcome: Progressing     Problem: GENITOURINARY - ADULT  Goal: Maintains or returns to baseline urinary function  Description  INTERVENTIONS:  - Assess urinary function  - Encourage oral fluids to ensure adequate hydration if ordered  - Administer IV fluids as ordered to ensure adequate hydration  - Administer ordered medications as needed  - Offer frequent toileting  - Follow urinary retention protocol if ordered  Outcome: Progressing  Goal: Absence of urinary retention  Description  INTERVENTIONS:  - Assess patients ability to void and empty bladder  - Monitor I/O  - Bladder scan as needed  - Discuss with physician/AP medications to alleviate retention as needed  - Discuss catheterization for long term situations as appropriate  Outcome: Progressing  Goal: Urinary catheter remains patent  Description  INTERVENTIONS:  - Assess patency of urinary catheter  - If patient has a chronic baca, consider changing catheter if non-functioning  - Follow guidelines for intermittent irrigation of non-functioning urinary catheter  Outcome: Progressing     Problem: MUSCULOSKELETAL - ADULT  Goal: Maintain or return mobility to safest level of function  Description  INTERVENTIONS:  - Assess patient's ability to carry out ADLs; assess patient's baseline for ADL function and identify physical deficits which impact ability to perform ADLs (bathing, care of mouth/teeth, toileting, grooming, dressing, etc )  - Assess/evaluate cause of self-care deficits   - Assess range of motion  - Assess patient's mobility  - Assess patient's need for assistive devices and provide as appropriate  - Encourage maximum independence but intervene and supervise when necessary  - Involve family in performance of ADLs  - Assess for home care needs following discharge   - Consider OT consult to assist with ADL evaluation and planning for discharge  - Provide patient education as appropriate  Outcome: Progressing

## 2019-10-13 NOTE — PROGRESS NOTES
Cardiology Progress Note - Ezella Barthel 69 y o  female MRN: [id number]    Unit/Bed#: -01 Encounter: [id number]      Assessment/Plan:  1  Acute on Chronic diastolic heart failure  · She is euvolemic  · Will continue to hold diuretics and monitor renal function  Agree with mild volume repletion  · When her renal function recovers and when cleared with nephrology would resume her home dose of oral lasix  (lasix 40mg/day)     2  Persistent atrial fibrillation/flutter  · Rate control adequate  Cont lopressor 50mg po bid and warfarin     3  TAM on CKD/solitary kidney-nephrology following     4  CAD- cont plavix, atorvastatin and metoprolol     5  PAD s/p Stenting- cont warfarin and plavix     6  Dementia     Will see as needed  Please call with further questions  Will arrange for outpatient follow up  Subjective:   Patient seen and examined  She denies chest pain, dyspnea, or any other concerns at this time  Objective:     Vitals: Blood pressure 104/65, pulse 101, temperature 98 3 °F (36 8 °C), temperature source Oral, resp   rate 18, weight 66 kg (145 lb 8 1 oz), SpO2 91 % , Body mass index is 26 61 kg/m² ,   Orthostatic Blood Pressures      Most Recent Value   Blood Pressure  104/65 filed at 10/13/2019 8374   Patient Position - Orthostatic VS  Lying filed at 10/13/2019 8929            Intake/Output Summary (Last 24 hours) at 10/13/2019 7962  Last data filed at 10/13/2019 0900  Gross per 24 hour   Intake 720 ml   Output 1100 ml   Net -380 ml         Physical Exam:    GEN: Ezella Barthel appears well, alert, pleasant and cooperative   HEENT: pupils equal, round, and reactive to light; extraocular muscles intact  NECK: supple, no carotid bruits   HEART: irregular rhythm, normal S1 and S2, no murmurs, clicks, gallops or rubs   LUNGS: clear to auscultation bilaterally; no wheezes, rales, or rhonchi   ABDOMEN: normal bowel sounds, soft, no tenderness, no distention  EXTREMITIES: peripheral pulses normal; no clubbing, cyanosis, or edema      Medications:      Current Facility-Administered Medications:     acetaminophen (TYLENOL) tablet 650 mg, 650 mg, Oral, Q6H PRN, Paolo Cazares, DO, 650 mg at 10/10/19 1136    albuterol (PROVENTIL HFA,VENTOLIN HFA) inhaler 2 puff, 2 puff, Inhalation, Q6H PRN, Ezra Bilis, DO    atorvastatin (LIPITOR) tablet 10 mg, 10 mg, Oral, Daily, Paolo Sage, DO, 10 mg at 10/13/19 0906    buPROPion (WELLBUTRIN SR) 12 hr tablet 150 mg, 150 mg, Oral, Daily, Paolo Cazares, DO, 150 mg at 10/13/19 0906    butalbital-acetaminophen-caffeine (FIORICET,ESGIC) -40 mg per tablet 1 tablet, 1 tablet, Oral, Q4H PRN, Ezra Bilis, DO    carBAMazepine (TEGretol) chewable tablet 100 mg, 100 mg, Oral, TID, Paolo Cazares, DO, 100 mg at 10/13/19 0906    cholecalciferol (VITAMIN D3) tablet 1,000 Units, 1,000 Units, Oral, Daily, Ezra Bassamis, DO, 1,000 Units at 10/13/19 0906    clopidogrel (PLAVIX) tablet 75 mg, 75 mg, Oral, Daily, Paloo Cazares, DO, 75 mg at 10/13/19 0906    diltiazem (CARDIZEM) injection 10 mg, 10 mg, Intravenous, Q4H PRN, Ezra Banegasis, DO    divalproex sodium (DEPAKOTE ER) 24 hr tablet 250 mg, 250 mg, Oral, BID, Paolo Cazares, DO, 250 mg at 10/13/19 0906    ferrous sulfate tablet 325 mg, 325 mg, Oral, BID, Paolo Cazares, DO, 325 mg at 10/13/19 0906    fluticasone-vilanterol (BREO ELLIPTA) 100-25 mcg/inh inhaler 1 puff, 1 puff, Inhalation, Daily, Paolo Cazares, DO, 1 puff at 10/13/19 0906    levothyroxine tablet 88 mcg, 88 mcg, Oral, Daily, Paolo Cazares, DO, 88 mcg at 10/13/19 0542    LORazepam (ATIVAN) tablet 0 5 mg, 0 5 mg, Oral, BID, Paolo Cazares DO, 0 5 mg at 10/13/19 0906    metoprolol tartrate (LOPRESSOR) tablet 50 mg, 50 mg, Oral, Q12H [facility], DO, 50 mg at 10/13/19 0906    ondansetron (ZOFRAN) injection 4 mg, 4 mg, Intravenous, Q4H PRN, Paolo Cazares DO, 4 mg at 10/12/19 0927    phenol (CHLORASEPTIC) 1 4 % mucosal liquid 1 spray, 1 spray, Mouth/Throat, Q2H PRN, Deneen Chavis PA-C, 1 spray at 10/11/19 1732    saccharomyces boulardii (FLORASTOR) capsule 250 mg, 250 mg, Oral, BID, Paolo Cazares DO, 250 mg at 10/13/19 0906    senna-docusate sodium (SENOKOT S) 8 6-50 mg per tablet 1 tablet, 1 tablet, Oral, BID, Azalia Rueda PA-C, 1 tablet at 10/13/19 5905    warfarin (COUMADIN) tablet 3 mg, 3 mg, Oral, Daily (warfarin), Deneen Chavis PA-C     Labs & Results:        Results from last 7 days   Lab Units 10/13/19  0509 10/12/19  0602 10/11/19  0519   WBC Thousand/uL 5 62 6 56 6 37   HEMOGLOBIN g/dL 13 1 14 2 13 7   HEMATOCRIT % 39 7 43 1 41 2   PLATELETS Thousands/uL 177 195 179         Results from last 7 days   Lab Units 10/13/19  0509 10/12/19  0602 10/11/19  0519   POTASSIUM mmol/L 3 9 4 3 3 0*   CHLORIDE mmol/L 102 101 100   CO2 mmol/L 31 34* 34*   BUN mg/dL 72* 66* 57*   CREATININE mg/dL 2 45* 2 72* 2 29*   CALCIUM mg/dL 9 8 9 7 9 8     Results from last 7 days   Lab Units 10/13/19  0509 10/12/19  0605 10/11/19  0645   INR  2 99* 2 94* 2 41*     Results from last 7 days   Lab Units 10/11/19  0519 10/08/19  0538 10/07/19  0452   MAGNESIUM mg/dL 2 3 2 1 2 1

## 2019-10-14 ENCOUNTER — TELEPHONE (OUTPATIENT)
Dept: CARDIOLOGY CLINIC | Facility: CLINIC | Age: 74
End: 2019-10-14

## 2019-10-14 LAB
ANION GAP SERPL CALCULATED.3IONS-SCNC: 7 MMOL/L (ref 4–13)
ANION GAP SERPL CALCULATED.3IONS-SCNC: 8 MMOL/L (ref 4–13)
BASOPHILS # BLD AUTO: 0.04 THOUSANDS/ΜL (ref 0–0.1)
BASOPHILS NFR BLD AUTO: 1 % (ref 0–1)
BUN SERPL-MCNC: 58 MG/DL (ref 5–25)
BUN SERPL-MCNC: 64 MG/DL (ref 5–25)
CALCIUM SERPL-MCNC: 9.6 MG/DL (ref 8.3–10.1)
CALCIUM SERPL-MCNC: 9.8 MG/DL (ref 8.3–10.1)
CHLORIDE SERPL-SCNC: 102 MMOL/L (ref 100–108)
CHLORIDE SERPL-SCNC: 103 MMOL/L (ref 100–108)
CO2 SERPL-SCNC: 32 MMOL/L (ref 21–32)
CO2 SERPL-SCNC: 33 MMOL/L (ref 21–32)
CREAT SERPL-MCNC: 1.69 MG/DL (ref 0.6–1.3)
CREAT SERPL-MCNC: 1.73 MG/DL (ref 0.6–1.3)
EOSINOPHIL # BLD AUTO: 0.31 THOUSAND/ΜL (ref 0–0.61)
EOSINOPHIL NFR BLD AUTO: 6 % (ref 0–6)
ERYTHROCYTE [DISTWIDTH] IN BLOOD BY AUTOMATED COUNT: 12.4 % (ref 11.6–15.1)
GFR SERPL CREATININE-BSD FRML MDRD: 29 ML/MIN/1.73SQ M
GFR SERPL CREATININE-BSD FRML MDRD: 30 ML/MIN/1.73SQ M
GLUCOSE SERPL-MCNC: 105 MG/DL (ref 65–140)
GLUCOSE SERPL-MCNC: 112 MG/DL (ref 65–140)
HCT VFR BLD AUTO: 40.3 % (ref 34.8–46.1)
HGB BLD-MCNC: 13.1 G/DL (ref 11.5–15.4)
IMM GRANULOCYTES # BLD AUTO: 0.04 THOUSAND/UL (ref 0–0.2)
IMM GRANULOCYTES NFR BLD AUTO: 1 % (ref 0–2)
INR PPP: 2.44 (ref 0.84–1.19)
INR PPP: 2.48 (ref 0.84–1.19)
LYMPHOCYTES # BLD AUTO: 2.08 THOUSANDS/ΜL (ref 0.6–4.47)
LYMPHOCYTES NFR BLD AUTO: 38 % (ref 14–44)
MCH RBC QN AUTO: 33.7 PG (ref 26.8–34.3)
MCHC RBC AUTO-ENTMCNC: 32.5 G/DL (ref 31.4–37.4)
MCV RBC AUTO: 104 FL (ref 82–98)
MONOCYTES # BLD AUTO: 0.64 THOUSAND/ΜL (ref 0.17–1.22)
MONOCYTES NFR BLD AUTO: 12 % (ref 4–12)
NEUTROPHILS # BLD AUTO: 2.43 THOUSANDS/ΜL (ref 1.85–7.62)
NEUTS SEG NFR BLD AUTO: 42 % (ref 43–75)
NRBC BLD AUTO-RTO: 0 /100 WBCS
PLATELET # BLD AUTO: 165 THOUSANDS/UL (ref 149–390)
PMV BLD AUTO: 12.4 FL (ref 8.9–12.7)
POTASSIUM SERPL-SCNC: 3.6 MMOL/L (ref 3.5–5.3)
POTASSIUM SERPL-SCNC: 5.4 MMOL/L (ref 3.5–5.3)
PROTHROMBIN TIME: 26.8 SECONDS (ref 11.6–14.5)
PROTHROMBIN TIME: 27.1 SECONDS (ref 11.6–14.5)
RBC # BLD AUTO: 3.89 MILLION/UL (ref 3.81–5.12)
SODIUM SERPL-SCNC: 141 MMOL/L (ref 136–145)
SODIUM SERPL-SCNC: 144 MMOL/L (ref 136–145)
WBC # BLD AUTO: 5.54 THOUSAND/UL (ref 4.31–10.16)

## 2019-10-14 PROCEDURE — 85610 PROTHROMBIN TIME: CPT | Performed by: PHYSICIAN ASSISTANT

## 2019-10-14 PROCEDURE — 80048 BASIC METABOLIC PNL TOTAL CA: CPT | Performed by: PHYSICIAN ASSISTANT

## 2019-10-14 PROCEDURE — 99232 SBSQ HOSP IP/OBS MODERATE 35: CPT | Performed by: PHYSICIAN ASSISTANT

## 2019-10-14 PROCEDURE — 85025 COMPLETE CBC W/AUTO DIFF WBC: CPT | Performed by: INTERNAL MEDICINE

## 2019-10-14 PROCEDURE — 99233 SBSQ HOSP IP/OBS HIGH 50: CPT | Performed by: INTERNAL MEDICINE

## 2019-10-14 PROCEDURE — 80048 BASIC METABOLIC PNL TOTAL CA: CPT | Performed by: INTERNAL MEDICINE

## 2019-10-14 RX ORDER — POLYETHYLENE GLYCOL 3350 17 G/17G
17 POWDER, FOR SOLUTION ORAL DAILY PRN
Status: DISCONTINUED | OUTPATIENT
Start: 2019-10-14 | End: 2019-10-15 | Stop reason: HOSPADM

## 2019-10-14 RX ORDER — SODIUM POLYSTYRENE SULFONATE 4.1 MEQ/G
30 POWDER, FOR SUSPENSION ORAL; RECTAL ONCE
Status: DISCONTINUED | OUTPATIENT
Start: 2019-10-14 | End: 2019-10-14

## 2019-10-14 RX ORDER — POTASSIUM CHLORIDE 20 MEQ/1
20 TABLET, EXTENDED RELEASE ORAL DAILY
Status: DISCONTINUED | OUTPATIENT
Start: 2019-10-15 | End: 2019-10-15 | Stop reason: HOSPADM

## 2019-10-14 RX ORDER — DOCUSATE SODIUM 100 MG/1
100 CAPSULE, LIQUID FILLED ORAL 2 TIMES DAILY
Status: DISCONTINUED | OUTPATIENT
Start: 2019-10-14 | End: 2019-10-15 | Stop reason: HOSPADM

## 2019-10-14 RX ORDER — FUROSEMIDE 40 MG/1
40 TABLET ORAL DAILY
Status: DISCONTINUED | OUTPATIENT
Start: 2019-10-15 | End: 2019-10-15 | Stop reason: HOSPADM

## 2019-10-14 RX ADMIN — DIVALPROEX SODIUM 250 MG: 250 TABLET, FILM COATED, EXTENDED RELEASE ORAL at 17:40

## 2019-10-14 RX ADMIN — VITAMIN D, TAB 1000IU (100/BT) 1000 UNITS: 25 TAB at 10:36

## 2019-10-14 RX ADMIN — CARBAMAZEPINE 100 MG: 100 TABLET, CHEWABLE ORAL at 10:35

## 2019-10-14 RX ADMIN — Medication 250 MG: at 10:35

## 2019-10-14 RX ADMIN — CLOPIDOGREL BISULFATE 75 MG: 75 TABLET ORAL at 10:36

## 2019-10-14 RX ADMIN — ATORVASTATIN CALCIUM 10 MG: 10 TABLET, FILM COATED ORAL at 10:37

## 2019-10-14 RX ADMIN — LEVOTHYROXINE SODIUM 88 MCG: 88 TABLET ORAL at 05:51

## 2019-10-14 RX ADMIN — Medication 250 MG: at 17:40

## 2019-10-14 RX ADMIN — CARBAMAZEPINE 100 MG: 100 TABLET, CHEWABLE ORAL at 20:40

## 2019-10-14 RX ADMIN — LORAZEPAM 0.5 MG: 0.5 TABLET ORAL at 10:35

## 2019-10-14 RX ADMIN — FERROUS SULFATE TAB 325 MG (65 MG ELEMENTAL FE) 325 MG: 325 (65 FE) TAB at 10:35

## 2019-10-14 RX ADMIN — SENNOSIDES AND DOCUSATE SODIUM 1 TABLET: 8.6; 5 TABLET ORAL at 10:37

## 2019-10-14 RX ADMIN — METOPROLOL TARTRATE 50 MG: 50 TABLET, FILM COATED ORAL at 10:40

## 2019-10-14 RX ADMIN — FLUTICASONE FUROATE AND VILANTEROL TRIFENATATE 1 PUFF: 100; 25 POWDER RESPIRATORY (INHALATION) at 10:38

## 2019-10-14 RX ADMIN — DIVALPROEX SODIUM 250 MG: 250 TABLET, FILM COATED, EXTENDED RELEASE ORAL at 10:36

## 2019-10-14 RX ADMIN — SENNOSIDES AND DOCUSATE SODIUM 1 TABLET: 8.6; 5 TABLET ORAL at 17:40

## 2019-10-14 RX ADMIN — LORAZEPAM 0.5 MG: 0.5 TABLET ORAL at 17:40

## 2019-10-14 RX ADMIN — CARBAMAZEPINE 100 MG: 100 TABLET, CHEWABLE ORAL at 17:39

## 2019-10-14 RX ADMIN — WARFARIN SODIUM 3 MG: 3 TABLET ORAL at 17:40

## 2019-10-14 RX ADMIN — FERROUS SULFATE TAB 325 MG (65 MG ELEMENTAL FE) 325 MG: 325 (65 FE) TAB at 17:39

## 2019-10-14 RX ADMIN — BUPROPION HYDROCHLORIDE 150 MG: 150 TABLET, FILM COATED, EXTENDED RELEASE ORAL at 10:35

## 2019-10-14 RX ADMIN — METOPROLOL TARTRATE 50 MG: 50 TABLET, FILM COATED ORAL at 20:33

## 2019-10-14 RX ADMIN — DOCUSATE SODIUM 100 MG: 100 CAPSULE, LIQUID FILLED ORAL at 17:40

## 2019-10-14 NOTE — PROGRESS NOTES
Progress Note Estrellita Engel 1945, 69 y o  female MRN: [id number]    Unit/Bed#: -01 Encounter: [id number]    Primary Care Provider: Edwin De La O DO   Date and time admitted to hospital: 10/3/2019  3:21 PM        * Acute on chronic diastolic congestive heart failure [facility])  Assessment & Plan  Wt Readings from Last 3 Encounters:   10/14/19 80 3 kg (177 lb)   09/30/19 88 3 kg (194 lb 10 7 oz)   07/18/19 83 5 kg (184 lb)     · Acute on chronic diastolic congestive heart failure on admission  Reported weight gain prior to admit  · Echo shows an EF of 45%, study was technically difficult to evaluate for diastolic dysfunction  · CXR showing cardiomegaly  Pro-BNP elevated 12k  Troponin <0 02   · Per Cardiology, Lasix was felt to no longer be effective during the admission for volume removal and switched to Bumex and ultimately Bumex gtt but patient then developed TAM  All diuretics were then held and Nephrology consulted  Cr has now improved from 2 72 to 1 69  Per Nephrology, okay to resume home dose of Lasix 40mg po daily  Will need a repeat BMP as outpatient in 3 days  · Cardiology input appreciated, right heart catheterization 10/8  · Daily weights, strict I/Os, low sodium diet  · Patient will also need close follow up with Cardiology in 1 week  Stage 3 chronic kidney disease ([facility] [address] )  Assessment & Plan  · TAM on CKD worsened with Bumex drip which is since discontinued  · Nephrology consult appreciated, received gentle fluids 2 days ago and diuretics have been held with improvement  Per Nephrology, okay to resume Lasix 40mg po daily and repeat BMP in 3 days as outpatient      Results from last 7 days   Lab Units 10/14/19  0842 10/14/19  0603 10/13/19  0509 10/12/19  0602 10/11/19  0519 10/09/19  0447 10/08/19  0538   BUN mg/dL 58* 64* 72* 66* 57* 42* 40*   CREATININE mg/dL 1 73* 1 69* 2 45* 2 72* 2 29* 1 78* 1 91*   EGFR ml/min/1 73sq m 29 30 19 17 21 28 26       Atrial flutter [facility])  Assessment & Plan  · Atrial fibrillation/flutter with RVR at times  · Cardiology input appreciated  On Metoprolol and transitioned from amiodarone drip to oral amiodarone, despite this patient remains in afib and cardiology has discontinued the amiodarone  · No acute events in 48 hrs and telemetry was discontinued  On Coumadin 3 mg at night  · INR is 2 99 yesterday and given 1 mg Coumadin yesterday and INR 2 43 today  Resume 3 mg today  · Daily INR ordered  Urinary retention  Assessment & Plan  · Chronic urinary retention with catheter  Was recently seen in the emergency department on 9/30/2019 where she was started on Ciprofloxacin  Urine culture at that time showed E coli  Completed 3 day course antibiotics  History of cerebral aneurysm repair  Assessment & Plan  · History of cerebral aneurysm status post clipping in 1996  Subsequently has migraine headaches  Patient does follow up with [address] [place] [address] Neurology and is maintained on Carbamazepine (as anti seizure) and Depakote (for mood stabilization)  · "bubble" sensation in the front of the head 10/8, unclear etiology; neuro exam nonfocal   · STAT CT head 10/8 showed no acute changes  Headache since resolved  · Depakote level low, Tegretol level normal     PAD (peripheral artery disease) ([facility])  Assessment & Plan  · Peripheral artery disease status post lower extremity stenting  No evidence of claudication at this time  · Continue Clopidogrel and Atorvastatin  · Patient was due to have a follow-up ultrasound to ensure stents are patent on Friday - will need to be rescheduled outpatient  Hypothyroidism  Assessment & Plan  · Hypothyroidism, continue Levothyroxine, normal TSH  Essential hypertension  Assessment & Plan  · Essential hypertension, continue metoprolol  Controlled  Ambulatory dysfunction  Assessment & Plan  · Ambulate 3 times daily  · PT to reevaluate  · Patient was at [facility]    Per daughter Manasa Perry, if unable to return to [facility], she would like to be able to take her home and care for her until she can return  Patient previously with poor experience at a prior STR - she states she would consider her Mom to go to [facility]  · Case management involvement  Hyperlipidemia  Assessment & Plan  · Hyperlipidemia  Continue Atorvastatin  Dementia [facility])  Assessment & Plan  · Dementia without behavioral disturbance  Patient does have underlying depression and mood currently stable on Wellbutrin and Lorazepam   Also takes Depakote for mood stabilization  VTE Pharmacologic Prophylaxis:   Pharmacologic: Warfarin (Coumadin)  Mechanical VTE Prophylaxis in Place: Yes    Patient Centered Rounds: I have performed bedside rounds with nursing staff today  Discussions with Specialists or Other Care Team Provider: Discussed with Nephrology  Education and Discussions with Family / Patient: Discussed with patient and called patient's daughter to update  Time Spent for Care: 30 minutes  More than 50% of total time spent on counseling and coordination of care as described above  Current Length of Stay: 11 day(s)    Current Patient Status: Inpatient   Certification Statement: The patient will continue to require additional inpatient hospital stay due to safe discharge plan  Discharge Plan: Not cleared  Code Status: Level 1 - Full Code      Subjective:   Patient denies any CP or SOB  No abdominal pain  No nausea or vomiting  Discussed with daughter, and she reports she would be unable to take patient home today until tomorrow as she works a double shift today  She was at [facility] and daughter reports if they will not accept her back, she would want to take her home if she can as her Mom had a bad experience previously at a STR - if she had to consider a STR, she reports it would be [facility]      Objective:     Vitals:   Temp (24hrs), Av °F (36 7 °C), Min:97 4 °F (36 3 °C), Max:98 4 °F (36 9 °C)    Temp:  [97 4 °F (36 3 °C)-98 4 °F (36 9 °C)] 97 4 °F (36 3 °C)  HR:  [] 73  Resp:  [18-20] 19  BP: ()/(57-88) 118/88  SpO2:  [92 %-97 %] 92 %  Body mass index is 32 37 kg/m²  Input and Output Summary (last 24 hours): Intake/Output Summary (Last 24 hours) at 10/14/2019 1216  Last data filed at 10/14/2019 0930  Gross per 24 hour   Intake 1200 ml   Output 1225 ml   Net -25 ml       Physical Exam:     Physical Exam   Constitutional: No distress  HENT:   Head: Normocephalic and atraumatic  Eyes: No scleral icterus  Cardiovascular:   +S1S2, irregularly, irregular  Pulmonary/Chest: Effort normal  No respiratory distress  She has no wheezes  She has no rales  Abdominal: Soft  Bowel sounds are normal  She exhibits no distension  There is no tenderness  Musculoskeletal: She exhibits no edema  Neurological: She is alert  Skin: Skin is warm and dry  She is not diaphoretic  Vitals reviewed  Additional Data:     Labs:    Results from last 7 days   Lab Units 10/14/19  0603   WBC Thousand/uL 5 54   HEMOGLOBIN g/dL 13 1   HEMATOCRIT % 40 3   PLATELETS Thousands/uL 165   NEUTROS PCT % 42*   LYMPHS PCT % 38   MONOS PCT % 12   EOS PCT % 6     Results from last 7 days   Lab Units 10/14/19  0842   POTASSIUM mmol/L 3 6   CHLORIDE mmol/L 103   CO2 mmol/L 33*   BUN mg/dL 58*   CREATININE mg/dL 1 73*   CALCIUM mg/dL 9 6     Results from last 7 days   Lab Units 10/14/19  0809   INR  2 44*       * I Have Reviewed All Lab Data Listed Above  * Additional Pertinent Lab Tests Reviewed: All Labs Within Last 24 Hours Reviewed    Imaging:    Imaging Reports Reviewed Today Include: No new imaging      Recent Cultures (last 7 days):           Last 24 Hours Medication List:     Current Facility-Administered Medications:  acetaminophen 650 mg Oral Q6H PRN Carlos Enrique Lerner, DO   albuterol 2 puff Inhalation Q6H PRN Paolo Cazares,    atorvastatin 10 mg Oral Daily Paolo Sage, DO   buPROPion 150 mg Oral Daily Tod James, DO   butalbital-acetaminophen-caffeine 1 tablet Oral Q4H PRN Tod James, DO   carBAMazepine 100 mg Oral TID Tod James, DO   cholecalciferol 1,000 Units Oral Daily Paolo Sage, DO   clopidogrel 75 mg Oral Daily Paolo Braggng, DO   diltiazem 10 mg Intravenous Q4H PRN Tod Jacob, DO   divalproex sodium 250 mg Oral BID Paolo Braggng, DO   ferrous sulfate 325 mg Oral BID Tod Jacob, DO   fluticasone-vilanterol 1 puff Inhalation Daily Paolo Braggng, DO   levothyroxine 88 mcg Oral Daily Paolo Sage, DO   LORazepam 0 5 mg Oral BID Paolo Braggng, DO   metoprolol tartrate 50 mg Oral Q12H [address] John Nims, DO   ondansetron 4 mg Intravenous Q4H PRN Tod James, DO   phenol 1 spray Mouth/Throat Q2H PRN Azalia Rueda PA-C   saccharomyces boulardii 250 mg Oral BID Paolo Cazares, DO   senna-docusate sodium 1 tablet Oral BID Azalia Rueda PA-C   warfarin 3 mg Oral Daily (warfarin) Elliot Gutierres PA-C        Today, Patient Was Seen By: Elliot Gutierres PA-C    ** Please Note: Dictation voice to text software may have been used in the creation of this document   **

## 2019-10-14 NOTE — ASSESSMENT & PLAN NOTE
Wt Readings from Last 3 Encounters:   10/14/19 80 3 kg (177 lb)   09/30/19 88 3 kg (194 lb 10 7 oz)   07/18/19 83 5 kg (184 lb)     · Acute on chronic diastolic congestive heart failure on admission  Reported weight gain prior to admit  · Echo shows an EF of 45%, study was technically difficult to evaluate for diastolic dysfunction  · CXR showing cardiomegaly  Pro-BNP elevated 12k  Troponin <0 02   · Per Cardiology, Lasix was felt to no longer be effective during the admission for volume removal and switched to Bumex and ultimately Bumex gtt but patient then developed TAM  All diuretics were then held and Nephrology consulted  Cr has now improved from 2 72 to 1 69  Per Nephrology, okay to resume home dose of Lasix 40mg po daily  Will need a repeat BMP as outpatient in 3 days  · Cardiology input appreciated, right heart catheterization 10/8  · Daily weights, strict I/Os, low sodium diet  · Patient will also need close follow up with Cardiology in 1 week

## 2019-10-14 NOTE — PLAN OF CARE
Problem: Potential for Falls  Goal: Patient will remain free of falls  Description  INTERVENTIONS:  - Assess patient frequently for physical needs  -  Identify cognitive and physical deficits and behaviors that affect risk of falls  -  [place] fall precautions as indicated by assessment   - Educate patient/family on patient safety including physical limitations  - Instruct patient to call for assistance with activity based on assessment  - Modify environment to reduce risk of injury  - Consider OT/PT consult to assist with strengthening/mobility  Outcome: Progressing     Problem: Prexisting or High Potential for Compromised Skin Integrity  Goal: Skin integrity is maintained or improved  Description  INTERVENTIONS:  - Identify patients at risk for skin breakdown  - Assess and monitor skin integrity  - Assess and monitor nutrition and hydration status  - Monitor labs   - Assess for incontinence   - Turn and reposition patient  - Assist with mobility/ambulation  - Relieve pressure over bony prominences  - Avoid friction and shearing  - Provide appropriate hygiene as needed including keeping skin clean and dry  - Evaluate need for skin moisturizer/barrier cream  - Collaborate with interdisciplinary team   - Patient/family teaching  - Consider wound care consult   Outcome: Progressing     Problem: SAFETY ADULT  Goal: Patient will remain free of falls  Description  INTERVENTIONS:  - Assess patient frequently for physical needs  -  Identify cognitive and physical deficits and behaviors that affect risk of falls    -  [place] fall precautions as indicated by assessment   - Educate patient/family on patient safety including physical limitations  - Instruct patient to call for assistance with activity based on assessment  - Modify environment to reduce risk of injury  - Consider OT/PT consult to assist with strengthening/mobility  Outcome: Progressing  Goal: Maintain or return to baseline ADL function  Description  INTERVENTIONS:  -  Assess patient's ability to carry out ADLs; assess patient's baseline for ADL function and identify physical deficits which impact ability to perform ADLs (bathing, care of mouth/teeth, toileting, grooming, dressing, etc )  - Assess/evaluate cause of self-care deficits   - Assess range of motion  - Assess patient's mobility; develop plan if impaired  - Assess patient's need for assistive devices and provide as appropriate  - Encourage maximum independence but intervene and supervise when necessary  - Involve family in performance of ADLs  - Assess for home care needs following discharge   - Consider OT consult to assist with ADL evaluation and planning for discharge  - Provide patient education as appropriate  Outcome: Progressing  Goal: Maintain or return mobility status to optimal level  Description  INTERVENTIONS:  - Assess patient's baseline mobility status (ambulation, transfers, stairs, etc )    - Identify cognitive and physical deficits and behaviors that affect mobility  - Identify mobility aids required to assist with transfers and/or ambulation (gait belt, sit-to-stand, lift, walker, cane, etc )  - [place] fall precautions as indicated by assessment  - Record patient progress and toleration of activity level on Mobility SBAR; progress patient to next Phase/Stage  - Instruct patient to call for assistance with activity based on assessment  - Consider rehabilitation consult to assist with strengthening/weightbearing, etc   Outcome: Progressing     Problem: DISCHARGE PLANNING  Goal: Discharge to home or other facility with appropriate resources  Description  INTERVENTIONS:  - Identify barriers to discharge w/patient and caregiver  - Arrange for needed discharge resources and transportation as appropriate  - Identify discharge learning needs (meds, wound care, etc )  - Arrange for interpretive services to assist at discharge as needed  - Refer to Case Management Department for coordinating discharge planning if the patient needs post-hospital services based on physician/advanced practitioner order or complex needs related to functional status, cognitive ability, or social support system  Outcome: Progressing     Problem: Knowledge Deficit  Goal: Patient/family/caregiver demonstrates understanding of disease process, treatment plan, medications, and discharge instructions  Description  Complete learning assessment and assess knowledge base    Interventions:  - Provide teaching at level of understanding  - Provide teaching via preferred learning methods  Outcome: Progressing     Problem: CARDIOVASCULAR - ADULT  Goal: Maintains optimal cardiac output and hemodynamic stability  Description  INTERVENTIONS:  - Monitor I/O, vital signs and rhythm  - Monitor for S/S and trends of decreased cardiac output  - Administer and titrate ordered vasoactive medications to optimize hemodynamic stability  - Assess quality of pulses, skin color and temperature  - Assess for signs of decreased coronary artery perfusion  - Instruct patient to report change in severity of symptoms  Outcome: Progressing  Goal: Absence of cardiac dysrhythmias or at baseline rhythm  Description  INTERVENTIONS:  - Continuous cardiac monitoring, vital signs, obtain 12 lead EKG if ordered  - Administer antiarrhythmic and heart rate control medications as ordered  - Monitor electrolytes and administer replacement therapy as ordered  Outcome: Progressing     Problem: GENITOURINARY - ADULT  Goal: Maintains or returns to baseline urinary function  Description  INTERVENTIONS:  - Assess urinary function  - Encourage oral fluids to ensure adequate hydration if ordered  - Administer IV fluids as ordered to ensure adequate hydration  - Administer ordered medications as needed  - Offer frequent toileting  - Follow urinary retention protocol if ordered  Outcome: Progressing  Goal: Absence of urinary retention  Description  INTERVENTIONS:  - Assess patients ability to void and empty bladder  - Monitor I/O  - Bladder scan as needed  - Discuss with physician/AP medications to alleviate retention as needed  - Discuss catheterization for long term situations as appropriate  Outcome: Progressing  Goal: Urinary catheter remains patent  Description  INTERVENTIONS:  - Assess patency of urinary catheter  - If patient has a chronic baca, consider changing catheter if non-functioning  - Follow guidelines for intermittent irrigation of non-functioning urinary catheter  Outcome: Progressing     Problem: MUSCULOSKELETAL - ADULT  Goal: Maintain or return mobility to safest level of function  Description  INTERVENTIONS:  - Assess patient's ability to carry out ADLs; assess patient's baseline for ADL function and identify physical deficits which impact ability to perform ADLs (bathing, care of mouth/teeth, toileting, grooming, dressing, etc )  - Assess/evaluate cause of self-care deficits   - Assess range of motion  - Assess patient's mobility  - Assess patient's need for assistive devices and provide as appropriate  - Encourage maximum independence but intervene and supervise when necessary  - Involve family in performance of ADLs  - Assess for home care needs following discharge   - Consider OT consult to assist with ADL evaluation and planning for discharge  - Provide patient education as appropriate  Outcome: Progressing

## 2019-10-14 NOTE — TELEPHONE ENCOUNTER
----- Message from Lina Valdes MD sent at 10/13/2019  9:57 AM EDT -----  Can you please make this patient an appointment for hosp follow up for heart failure in 1-2 weeks   Thanks

## 2019-10-14 NOTE — PROGRESS NOTES
NEPHROLOGY PROGRESS NOTE    Patient: Chula Castro               Sex: female          DOA: 10/3/2019  3:21 PM   Date of Birth: @        Age: @        LOS:  LOS: 11 days   10/14/2019    REASON FOR THE CONSULTATION:  Further management of TAM  HPI     This is a 69 y o  female admitted for Acute on chronic diastolic congestive heart failure ([facility] [address] )     SUBJECTIVE     - patient has remained nonoliguric overnight and breathing is also currently stable  Patient was feeling better today  Patient denies nausea, vomiting, headache or dizziness today    - Reviewed last 24 hrs events     CURRENT MEDICATIONS       Current Facility-Administered Medications:     acetaminophen (TYLENOL) tablet 650 mg, 650 mg, Oral, Q6H PRN, Paolo Cazares, DO, 650 mg at 10/10/19 1136    albuterol (PROVENTIL HFA,VENTOLIN HFA) inhaler 2 puff, 2 puff, Inhalation, Q6H PRN, Joaquín Arrieta DO    atorvastatin (LIPITOR) tablet 10 mg, 10 mg, Oral, Daily, Paolo Cazares DO, 10 mg at 10/14/19 1037    buPROPion (WELLBUTRIN SR) 12 hr tablet 150 mg, 150 mg, Oral, Daily, Paolo Cazares, DO, 150 mg at 10/14/19 1035    butalbital-acetaminophen-caffeine (FIORICET,ESGIC) -40 mg per tablet 1 tablet, 1 tablet, Oral, Q4H PRN, Joaquín Arrieta DO    carBAMazepine (TEGretol) chewable tablet 100 mg, 100 mg, Oral, TID, Paolo Cazares DO, 100 mg at 10/14/19 1035    cholecalciferol (VITAMIN D3) tablet 1,000 Units, 1,000 Units, Oral, Daily, Paolo Cazares DO, 1,000 Units at 10/14/19 1036    clopidogrel (PLAVIX) tablet 75 mg, 75 mg, Oral, Daily, Paolo Cazares DO, 75 mg at 10/14/19 1036    diltiazem (CARDIZEM) injection 10 mg, 10 mg, Intravenous, Q4H PRN, Joaquín Arrieta DO    divalproex sodium (DEPAKOTE ER) 24 hr tablet 250 mg, 250 mg, Oral, BID, Paolo Cazares DO, 250 mg at 10/14/19 1036    ferrous sulfate tablet 325 mg, 325 mg, Oral, BID, Paolo Cazares DO, 325 mg at 10/14/19 1035    fluticasone-vilanterol (BREO ELLIPTA) 100-25 mcg/inh inhaler 1 puff, 1 puff, Inhalation, Daily, Paolo Cazares DO, 1 puff at 10/14/19 1038    levothyroxine tablet 88 mcg, 88 mcg, Oral, Daily, Paolo Cazares DO, 88 mcg at 10/14/19 0551    LORazepam (ATIVAN) tablet 0 5 mg, 0 5 mg, Oral, BID, Paolo Cazares, DO, 0 5 mg at 10/14/19 1035    metoprolol tartrate (LOPRESSOR) tablet 50 mg, 50 mg, Oral, Q12H [address], Alonzo Caller, DO, 50 mg at 10/14/19 1040    ondansetron (ZOFRAN) injection 4 mg, 4 mg, Intravenous, Q4H PRN, Paolo Cazares DO, 4 mg at 10/12/19 0927    phenol (CHLORASEPTIC) 1 4 % mucosal liquid 1 spray, 1 spray, Mouth/Throat, Q2H PRN, Azalia Rueda PA-C, 1 spray at 10/11/19 1732    saccharomyces boulardii (FLORASTOR) capsule 250 mg, 250 mg, Oral, BID, Paolo Cazares, DO, 250 mg at 10/14/19 1035    senna-docusate sodium (SENOKOT S) 8 6-50 mg per tablet 1 tablet, 1 tablet, Oral, BID, Azalia Rueda PA-C, 1 tablet at 10/14/19 1037    warfarin (COUMADIN) tablet 3 mg, 3 mg, Oral, Daily (warfarin), Ayaz Calixto PA-C    OBJECTIVE     Current Weight: Weight - Scale: 65 4 kg (144 lb 2 9 oz)  Vitals:    10/14/19 1050   BP:    Pulse:    Resp:    Temp:    SpO2: 92%     Body mass index is 26 37 kg/m²  Intake/Output Summary (Last 24 hours) at 10/14/2019 1137  Last data filed at 10/14/2019 0600  Gross per 24 hour   Intake 1200 ml   Output 1225 ml   Net -25 ml       PHYSICAL EXAMINATION     Physical Exam   Constitutional: No distress  HENT:   Head: Normocephalic and atraumatic  Eyes: No scleral icterus  Neck: Neck supple  No JVD present  Cardiovascular: Normal rate  Pulmonary/Chest: No accessory muscle usage  No respiratory distress  She has no wheezes  Abdominal: Soft  She exhibits no distension  Musculoskeletal:        Right hand: She exhibits no laceration  Left hand: She exhibits no laceration  Neurological: She is alert  Skin: Skin is warm  No cyanosis  Psychiatric: She is not combative  She expresses no suicidal ideation           LAB RESULTS     Results from last 7 days   Lab Units 10/14/19  0842 10/14/19  0603 10/13/19  0509 10/12/19  0602 10/11/19  0519 10/09/19  0447 10/08/19  0538   WBC Thousand/uL  --  5 54 5 62 6 56 6 37 5 94  --    HEMOGLOBIN g/dL  --  13 1 13 1 14 2 13 7 12 9  --    HEMATOCRIT %  --  40 3 39 7 43 1 41 2 39 3  --    PLATELETS Thousands/uL  --  165 177 195 179 175  --    SODIUM mmol/L 144 141 143 144 145 142 145   POTASSIUM mmol/L 3 6 5 4* 3 9 4 3 3 0* 3 9 3 4*   CHLORIDE mmol/L 103 102 102 101 100 104 102   CO2 mmol/L 33* 32 31 34* 34* 30 31   BUN mg/dL 58* 64* 72* 66* 57* 42* 40*   CREATININE mg/dL 1 73* 1 69* 2 45* 2 72* 2 29* 1 78* 1 91*   EGFR ml/min/1 73sq m 29 30 19 17 21 28 26   CALCIUM mg/dL 9 6 9 8 9 8 9 7 9 8 9 6 9 6   MAGNESIUM mg/dL  --   --   --   --  2 3  --  2 1       I have personally reviewed the old medical records and patient's previously known baseline creatinine level is ~ 1 4-1 6    RADIOLOGY RESULTS      CT head wo contrast   Final Result by Missy Martinez MD (10/08 1333)      Chronic intracranial changes identified  No acute intracranial abnormality                  Workstation performed: [id number]         XR chest 2 views   ED Interpretation by Lex Estrada PA-C (10/03 1649)   Cardiomegaly with pulmonary vascular congestion      Final Result by Drew Macdonald MD (10/04 6712)      Cardiomegaly  Minimal prominence of the pulmonary vessels  Workstation performed: [id number]             PLAN / RECOMMENDATIONS      1  TAM on top of CKD stage 3  Urine lytes were prerenal in nature and TAM was suspected due to intravascular volume depletion in the setting of use of diuretics  Currently patient is off diuretics and overnight patient was found to be nonoliguric and renal function has also significantly improved to current creatinine of 1 73    Clinically patient is not in volume overload state and would be okay to resume Lasix 40 mg PO daily upon discharge but patient would repeat BMP in 2-3 days and need close cardiology follow-up  Plan to recheck renal function with AM labs if remains in the hospital today  2  Hypertension in chronic kidney disease  Currently blood pressure is under well controlled  Continue monitor hypertension with metoprolol 50 mg PO BID  3  Azotemia  Multifactorial, overnight BUN level has improved to 58  Patient has remained asymptomatic from azotemia perspective  Plan to monitor BUN level while in the hospital     Disposition:  Stable from renal standpoint to discharge  Okay to resume Lasix 40 mg PO daily but need BMP in 2-3 days and need close cardiology follow-up  Overall above mentioned plan was also d/w Joselito Miranda MD  Nephrology  10/14/2019        Portions of the record may have been created with voice recognition software  Occasional wrong word or "sound a like" substitutions may have occurred due to the inherent limitations of voice recognition software  Read the chart carefully and recognize, using context, where substitutions have occurred

## 2019-10-14 NOTE — SOCIAL WORK
Mathew met with the pt and daughter and was informed that they would like the pt to DC to [facility]  The pt daughter states that she is familiar with the facility  Mathew sent the referral to [facility] for the pt and family

## 2019-10-14 NOTE — ASSESSMENT & PLAN NOTE
· Atrial fibrillation/flutter with RVR at times  · Cardiology input appreciated  On Metoprolol and transitioned from amiodarone drip to oral amiodarone, despite this patient remains in afib and cardiology has discontinued the amiodarone  · No acute events in 48 hrs and telemetry was discontinued  On Coumadin 3 mg at night  · INR is 2 99 yesterday and given 1 mg Coumadin yesterday and INR 2 43 today  Resume 3 mg today  · Daily INR ordered

## 2019-10-14 NOTE — ASSESSMENT & PLAN NOTE
· TAM on CKD worsened with Bumex drip which is since discontinued  · Nephrology consult appreciated, received gentle fluids 2 days ago and diuretics have been held with improvement  Per Nephrology, okay to resume Lasix 40mg po daily and repeat BMP in 3 days as outpatient      Results from last 7 days   Lab Units 10/14/19  0842 10/14/19  0603 10/13/19  0509 10/12/19  0602 10/11/19  0519 10/09/19  0447 10/08/19  0538   BUN mg/dL 58* 64* 72* 66* 57* 42* 40*   CREATININE mg/dL 1 73* 1 69* 2 45* 2 72* 2 29* 1 78* 1 91*   EGFR ml/min/1 73sq m 29 30 19 17 21 28 26

## 2019-10-14 NOTE — ASSESSMENT & PLAN NOTE
· Ambulate 3 times daily  · PT to reevaluate  · Patient was at [facility]  Per daughter Joel Martin, if unable to return to [facility], she would like to be able to take her home and care for her until she can return  Patient previously with poor experience at a prior STR - she states she would consider her Mom to go to [facility]  · Case management involvement

## 2019-10-14 NOTE — ASSESSMENT & PLAN NOTE
· History of cerebral aneurysm status post clipping in 1996  Subsequently has migraine headaches  Patient does follow up with [facility] and is maintained on Carbamazepine (as anti seizure) and Depakote (for mood stabilization)  · "bubble" sensation in the front of the head 10/8, unclear etiology; neuro exam nonfocal   · STAT CT head 10/8 showed no acute changes  Headache since resolved    · Depakote level low, Tegretol level normal

## 2019-10-15 VITALS
RESPIRATION RATE: 18 BRPM | HEART RATE: 112 BPM | OXYGEN SATURATION: 93 % | HEIGHT: 62 IN | DIASTOLIC BLOOD PRESSURE: 71 MMHG | BODY MASS INDEX: 32.57 KG/M2 | TEMPERATURE: 97.8 F | WEIGHT: 177 LBS | SYSTOLIC BLOOD PRESSURE: 120 MMHG

## 2019-10-15 LAB
ANION GAP SERPL CALCULATED.3IONS-SCNC: 7 MMOL/L (ref 4–13)
BASOPHILS # BLD AUTO: 0.03 THOUSANDS/ΜL (ref 0–0.1)
BASOPHILS NFR BLD AUTO: 1 % (ref 0–1)
BUN SERPL-MCNC: 60 MG/DL (ref 5–25)
CALCIUM SERPL-MCNC: 10.2 MG/DL (ref 8.3–10.1)
CHLORIDE SERPL-SCNC: 106 MMOL/L (ref 100–108)
CO2 SERPL-SCNC: 32 MMOL/L (ref 21–32)
CREAT SERPL-MCNC: 1.66 MG/DL (ref 0.6–1.3)
EOSINOPHIL # BLD AUTO: 0.22 THOUSAND/ΜL (ref 0–0.61)
EOSINOPHIL NFR BLD AUTO: 4 % (ref 0–6)
ERYTHROCYTE [DISTWIDTH] IN BLOOD BY AUTOMATED COUNT: 12.5 % (ref 11.6–15.1)
GFR SERPL CREATININE-BSD FRML MDRD: 30 ML/MIN/1.73SQ M
GLUCOSE SERPL-MCNC: 115 MG/DL (ref 65–140)
HCT VFR BLD AUTO: 40.1 % (ref 34.8–46.1)
HGB BLD-MCNC: 12.9 G/DL (ref 11.5–15.4)
IMM GRANULOCYTES # BLD AUTO: 0.02 THOUSAND/UL (ref 0–0.2)
IMM GRANULOCYTES NFR BLD AUTO: 0 % (ref 0–2)
INR PPP: 2.39 (ref 0.84–1.19)
LYMPHOCYTES # BLD AUTO: 2.22 THOUSANDS/ΜL (ref 0.6–4.47)
LYMPHOCYTES NFR BLD AUTO: 42 % (ref 14–44)
MCH RBC QN AUTO: 33.1 PG (ref 26.8–34.3)
MCHC RBC AUTO-ENTMCNC: 32.2 G/DL (ref 31.4–37.4)
MCV RBC AUTO: 103 FL (ref 82–98)
MONOCYTES # BLD AUTO: 0.52 THOUSAND/ΜL (ref 0.17–1.22)
MONOCYTES NFR BLD AUTO: 10 % (ref 4–12)
NEUTROPHILS # BLD AUTO: 2.27 THOUSANDS/ΜL (ref 1.85–7.62)
NEUTS SEG NFR BLD AUTO: 43 % (ref 43–75)
NRBC BLD AUTO-RTO: 0 /100 WBCS
PLATELET # BLD AUTO: 146 THOUSANDS/UL (ref 149–390)
PMV BLD AUTO: 12 FL (ref 8.9–12.7)
POTASSIUM SERPL-SCNC: 3.6 MMOL/L (ref 3.5–5.3)
PROTHROMBIN TIME: 26.3 SECONDS (ref 11.6–14.5)
RBC # BLD AUTO: 3.9 MILLION/UL (ref 3.81–5.12)
SODIUM SERPL-SCNC: 145 MMOL/L (ref 136–145)
WBC # BLD AUTO: 5.28 THOUSAND/UL (ref 4.31–10.16)

## 2019-10-15 PROCEDURE — G8988 SELF CARE GOAL STATUS: HCPCS

## 2019-10-15 PROCEDURE — 85610 PROTHROMBIN TIME: CPT | Performed by: PHYSICIAN ASSISTANT

## 2019-10-15 PROCEDURE — 80048 BASIC METABOLIC PNL TOTAL CA: CPT | Performed by: INTERNAL MEDICINE

## 2019-10-15 PROCEDURE — 97116 GAIT TRAINING THERAPY: CPT

## 2019-10-15 PROCEDURE — 97167 OT EVAL HIGH COMPLEX 60 MIN: CPT

## 2019-10-15 PROCEDURE — 97110 THERAPEUTIC EXERCISES: CPT

## 2019-10-15 PROCEDURE — 99233 SBSQ HOSP IP/OBS HIGH 50: CPT | Performed by: INTERNAL MEDICINE

## 2019-10-15 PROCEDURE — 85025 COMPLETE CBC W/AUTO DIFF WBC: CPT | Performed by: INTERNAL MEDICINE

## 2019-10-15 PROCEDURE — G8987 SELF CARE CURRENT STATUS: HCPCS

## 2019-10-15 PROCEDURE — 99238 HOSP IP/OBS DSCHRG MGMT 30/<: CPT | Performed by: PHYSICIAN ASSISTANT

## 2019-10-15 RX ORDER — LORAZEPAM 0.5 MG/1
0.5 TABLET ORAL 2 TIMES DAILY
Qty: 20 TABLET | Refills: 0 | Status: SHIPPED | OUTPATIENT
Start: 2019-10-15 | End: 2021-01-04 | Stop reason: HOSPADM

## 2019-10-15 RX ADMIN — Medication 250 MG: at 09:32

## 2019-10-15 RX ADMIN — LORAZEPAM 0.5 MG: 0.5 TABLET ORAL at 09:32

## 2019-10-15 RX ADMIN — BUPROPION HYDROCHLORIDE 150 MG: 150 TABLET, FILM COATED, EXTENDED RELEASE ORAL at 09:33

## 2019-10-15 RX ADMIN — DOCUSATE SODIUM 100 MG: 100 CAPSULE, LIQUID FILLED ORAL at 09:32

## 2019-10-15 RX ADMIN — ATORVASTATIN CALCIUM 10 MG: 10 TABLET, FILM COATED ORAL at 09:32

## 2019-10-15 RX ADMIN — FERROUS SULFATE TAB 325 MG (65 MG ELEMENTAL FE) 325 MG: 325 (65 FE) TAB at 09:33

## 2019-10-15 RX ADMIN — LEVOTHYROXINE SODIUM 88 MCG: 88 TABLET ORAL at 06:49

## 2019-10-15 RX ADMIN — FUROSEMIDE 40 MG: 40 TABLET ORAL at 09:33

## 2019-10-15 RX ADMIN — METOPROLOL TARTRATE 50 MG: 50 TABLET, FILM COATED ORAL at 09:32

## 2019-10-15 RX ADMIN — VITAMIN D, TAB 1000IU (100/BT) 1000 UNITS: 25 TAB at 09:33

## 2019-10-15 RX ADMIN — CARBAMAZEPINE 100 MG: 100 TABLET, CHEWABLE ORAL at 09:33

## 2019-10-15 RX ADMIN — FLUTICASONE FUROATE AND VILANTEROL TRIFENATATE 1 PUFF: 100; 25 POWDER RESPIRATORY (INHALATION) at 09:33

## 2019-10-15 RX ADMIN — CLOPIDOGREL BISULFATE 75 MG: 75 TABLET ORAL at 09:32

## 2019-10-15 RX ADMIN — SENNOSIDES AND DOCUSATE SODIUM 1 TABLET: 8.6; 5 TABLET ORAL at 09:32

## 2019-10-15 RX ADMIN — DIVALPROEX SODIUM 250 MG: 250 TABLET, FILM COATED, EXTENDED RELEASE ORAL at 09:32

## 2019-10-15 RX ADMIN — POTASSIUM CHLORIDE 20 MEQ: 1500 TABLET, EXTENDED RELEASE ORAL at 09:32

## 2019-10-15 NOTE — ASSESSMENT & PLAN NOTE
· PT and OT worked with the patient, they are recommending rehab  Patient's daughter initially hesitant, however now agreeable to [facility] and patient has been accepted  Patient eventually will return to [facility] if able  · Case management involvement for discharge planning purposes

## 2019-10-15 NOTE — PROGRESS NOTES
NEPHROLOGY PROGRESS NOTE    Patient: Darrell Herring               Sex: female          DOA: 10/3/2019  3:21 PM   Date of Birth: @        Age: @        LOS:  LOS: 12 days   10/15/2019    REASON FOR THE CONSULTATION:  Further management of TAM    HPI     This is a 69 y o  female admitted for Acute on chronic diastolic congestive heart failure ([facility] [address] )     SUBJECTIVE     - feeling better today   breathing is stable and also found to be nonoliguric  Patient denies nausea, vomiting, headache or dizziness today    - Reviewed last 24 hrs events     CURRENT MEDICATIONS       Current Facility-Administered Medications:     acetaminophen (TYLENOL) tablet 650 mg, 650 mg, Oral, Q6H PRN, Paolo Cazares, DO, 650 mg at 10/10/19 1136    albuterol (PROVENTIL HFA,VENTOLIN HFA) inhaler 2 puff, 2 puff, Inhalation, Q6H PRN, Prudencio Heltonas, DO    atorvastatin (LIPITOR) tablet 10 mg, 10 mg, Oral, Daily, Paolo Cazares, DO, 10 mg at 10/15/19 0932    buPROPion (WELLBUTRIN SR) 12 hr tablet 150 mg, 150 mg, Oral, Daily, Paolo Cazares, DO, 150 mg at 10/15/19 0933    butalbital-acetaminophen-caffeine (FIORICET,ESGIC) -40 mg per tablet 1 tablet, 1 tablet, Oral, Q4H PRN, Prudencio Moras, DO    carBAMazepine (TEGretol) chewable tablet 100 mg, 100 mg, Oral, TID, Paolo Cazares, DO, 100 mg at 10/15/19 0933    cholecalciferol (VITAMIN D3) tablet 1,000 Units, 1,000 Units, Oral, Daily, Prudencio Wall, DO, 1,000 Units at 10/15/19 0933    clopidogrel (PLAVIX) tablet 75 mg, 75 mg, Oral, Daily, Paolo Cazares, DO, 75 mg at 10/15/19 0932    diltiazem (CARDIZEM) injection 10 mg, 10 mg, Intravenous, Q4H PRN, Prudencio Moras, DO    divalproex sodium (DEPAKOTE ER) 24 hr tablet 250 mg, 250 mg, Oral, BID, Paolo Cazares DO, 250 mg at 10/15/19 0932    docusate sodium (COLACE) capsule 100 mg, 100 mg, Oral, BID, Haleigh Portillo PA-C, 100 mg at 10/15/19 0932    ferrous sulfate tablet 325 mg, 325 mg, Oral, BID, Paolo Cazares DO, 325 mg at 10/15/19 0978   fluticasone-vilanterol (BREO ELLIPTA) 100-25 mcg/inh inhaler 1 puff, 1 puff, Inhalation, Daily, Paolo Cazares DO, 1 puff at 10/15/19 0933    furosemide (LASIX) tablet 40 mg, 40 mg, Oral, Daily, Matthew Terry PA-C, 40 mg at 10/15/19 0933    levothyroxine tablet 88 mcg, 88 mcg, Oral, Daily, Paolo Cazares DO, 88 mcg at 10/15/19 0649    LORazepam (ATIVAN) tablet 0 5 mg, 0 5 mg, Oral, BID, Paolo Cazares, DO, 0 5 mg at 10/15/19 0932    metoprolol tartrate (LOPRESSOR) tablet 50 mg, 50 mg, Oral, Q12H [address], [place] , DO, 50 mg at 10/15/19 0932    ondansetron (ZOFRAN) injection 4 mg, 4 mg, Intravenous, Q4H PRN, Paolo Cazares DO, 4 mg at 10/12/19 0927    phenol (CHLORASEPTIC) 1 4 % mucosal liquid 1 spray, 1 spray, Mouth/Throat, Q2H PRN, Azalia Rueda PA-C, 1 spray at 10/11/19 1732    polyethylene glycol (MIRALAX) packet 17 g, 17 g, Oral, Daily PRN, Matthew Terry PA-C    potassium chloride (K-DUR,KLOR-CON) CR tablet 20 mEq, 20 mEq, Oral, Daily, Matthew Terry PA-C, 20 mEq at 10/15/19 0932    saccharomyces boulardii (FLORASTOR) capsule 250 mg, 250 mg, Oral, BID, Paolo Cazares, DO, 250 mg at 10/15/19 0932    senna-docusate sodium (SENOKOT S) 8 6-50 mg per tablet 1 tablet, 1 tablet, Oral, BID, Azalia Rueda PA-C, 1 tablet at 10/15/19 0932    warfarin (COUMADIN) tablet 3 mg, 3 mg, Oral, Daily (warfarin), Matthew Terry PA-C, 3 mg at 10/14/19 1740    OBJECTIVE     Current Weight: Weight - Scale: 80 3 kg (177 lb)  Vitals:    10/15/19 0700   BP: 142/76   Pulse: 101   Resp: 18   Temp: (!) 97 4 °F (36 3 °C)   SpO2: 95%     Body mass index is 32 37 kg/m²  Intake/Output Summary (Last 24 hours) at 10/15/2019 1154  Last data filed at 10/15/2019 0001  Gross per 24 hour   Intake 600 ml   Output 875 ml   Net -275 ml       PHYSICAL EXAMINATION     Physical Exam   Constitutional: No distress  HENT:   Head: Normocephalic and atraumatic  Eyes: No scleral icterus  Neck: Neck supple  No JVD present  Cardiovascular: Normal rate  Pulmonary/Chest: No accessory muscle usage  No respiratory distress  She has no wheezes  Abdominal: Soft  She exhibits no distension  Musculoskeletal:        Right hand: She exhibits no laceration  Left hand: She exhibits no laceration  Neurological: She is alert  Skin: Skin is warm  No cyanosis  Psychiatric: She is not combative  She expresses no suicidal ideation  LAB RESULTS     Results from last 7 days   Lab Units 10/15/19  0503 10/14/19  4752 10/14/19  0603 10/13/19  0509 10/12/19  0602 10/11/19  0519 10/09/19  0447   WBC Thousand/uL 5 28  --  5 54 5 62 6 56 6 37 5 94   HEMOGLOBIN g/dL 12 9  --  13 1 13 1 14 2 13 7 12 9   HEMATOCRIT % 40 1  --  40 3 39 7 43 1 41 2 39 3   PLATELETS Thousands/uL 146*  --  165 177 195 179 175   SODIUM mmol/L 145 144 141 143 144 145 142   POTASSIUM mmol/L 3 6 3 6 5 4* 3 9 4 3 3 0* 3 9   CHLORIDE mmol/L 106 103 102 102 101 100 104   CO2 mmol/L 32 33* 32 31 34* 34* 30   BUN mg/dL 60* 58* 64* 72* 66* 57* 42*   CREATININE mg/dL 1 66* 1 73* 1 69* 2 45* 2 72* 2 29* 1 78*   EGFR ml/min/1 73sq m 30 29 30 19 17 21 28   CALCIUM mg/dL 10 2* 9 6 9 8 9 8 9 7 9 8 9 6   MAGNESIUM mg/dL  --   --   --   --   --  2 3  --        I have personally reviewed the old medical records and patient's previously known baseline creatinine level is ~ 1 4-1 6    RADIOLOGY RESULTS      CT head wo contrast   Final Result by Gemma Frank MD (10/08 1333)      Chronic intracranial changes identified  No acute intracranial abnormality                  Workstation performed: [id number]         XR chest 2 views   ED Interpretation by Erna Lozada PA-C (10/03 1649)   Cardiomegaly with pulmonary vascular congestion      Final Result by Manolo Dye MD (10/04 1367)      Cardiomegaly  Minimal prominence of the pulmonary vessels  Workstation performed: [id number]             PLAN / RECOMMENDATIONS      1  TAM on top of CKD stage 3    Urine lytes were prerenal in nature suggested TAM was most likely secondary to aggressive diuresis  Upon review of old medical records patient's previously known baseline creatinine was between 1 4-1 6  Overnight patient has remained nonoliguric and renal function has also slightly improved to current creatinine of 1 66  Lasix 40 mg PO daily was resume today  Monitor renal function while in the hospital     2  Hypertension in chronic kidney disease  Overnight patient's blood pressure has remained under good control and will continue monitor hypertension with metoprolol 50 mg PO BID today  3  Azotemia  Multifactorial, overnight BUN level has remained relatively stable at 60 and patient has remained asymptomatic from azotemia perspective  Lasix was resumed today as mentioned earlier  Monitor BUN level while in the hospital     Disposition:  Stable from renal standpoint for discharge  Need to have repeat BMP in 2-3 days and need close cardiology follow-up for adjustment of diuretics in the future  Overall above mentioned plan was also d/w Tom Zamora MD  Nephrology  10/15/2019        Portions of the record may have been created with voice recognition software  Occasional wrong word or "sound a like" substitutions may have occurred due to the inherent limitations of voice recognition software  Read the chart carefully and recognize, using context, where substitutions have occurred

## 2019-10-15 NOTE — ASSESSMENT & PLAN NOTE
· TAM on CKD worsened with Bumex drip which is since discontinued  · Nephrology consult appreciated, renal functions have significantly improved to baseline creatinine  · Per Nephrology, okay to resume Lasix 40mg po daily and repeat BMP in 3 days as outpatient      Results from last 7 days   Lab Units 10/15/19  0503 10/14/19  9351 10/14/19  0603 10/13/19  0509 10/12/19  0602 10/11/19  0519 10/09/19  0447   BUN mg/dL 60* 58* 64* 72* 66* 57* 42*   CREATININE mg/dL 1 66* 1 73* 1 69* 2 45* 2 72* 2 29* 1 78*   EGFR ml/min/1 73sq m 30 29 30 19 17 21 28

## 2019-10-15 NOTE — TRANSPORTATION MEDICAL NECESSITY
Section I - General Information    Name of Patient: Mayte Quesada                 : 1945    Medicare #: [id number]  Transport Date: 10/15/19 (PCS is valid for round trips on this date and for all repetitive trips in the 60-day range as noted below )  Origin: [address]: [place]  Is the pt's stay covered under Medicare Part A (PPS/DRG)   [x]     Closest appropriate facility? If no, why is transport to more distant facility required? Yes  If hospice pt, is this transport related to pt's terminal illness? NA       Section II - Medical Necessity Questionnaire  Ambulance transportation is medically necessary only if other means of transport are contraindicated or would be potentially harmful to the patient  To meet this requirement, the patient must either be "bed confined" or suffer from a condition such that transport by means other than ambulance is contraindicated by the patient's condition  The following questions must be answered by the medical professional signing below for this form to be valid:    1)  Describe the MEDICAL CONDITION (physical and/or mental) of this patient AT [address] [place] [address] that requires the patient to be transported in an ambulance and why transport by other means is contraindicated by the patient's condition:decreased strength, decreased endurance, impaired balance, decreased safety awareness, decreased cognition, impaired hearing    2) Is the patient "bed confined" as defined below? No  To be "be confined" the patient must satisfy all three of the following conditions: (1) unable to get up from bed without Assistance; AND (2) unable to ambulate; AND (3) unable to sit in a chair or wheelchair  3) Can this patient safely be transported by car or wheelchair van (i e , seated during transport without a medical attendant or monitoring)?    No    4) In addition to completing questions 1-3 above, please check any of the following conditions that apply*:   *Note: supporting documentation for any boxes checked must be maintained in the patient's medical records  If hosp-hosp transfer, describe services needed at 2nd facility not available at 1st facility? Patient is confused  Medical attendant required   Unable to tolerate seated position for time needed to transport    Pt on O2      Section III - Signature of Physician or Healthcare Professional  I certify that the above information is true and correct based on my evaluation of this patient, and represent that the patient requires transport by ambulance and that other forms of transport are contraindicated  I understand that this information will be used by the Centers for Medicare and Medicaid Services (CMS) to support the determination of medical necessity for ambulance services, and I represent that I have personal knowledge of the patient's condition at time of transport  []  If this box is checked, I also certify that the patient is physically or mentally incapable of signing the ambulance service's claim and that the institution with which I am affiliated has furnished care, services, or assistance to the patient  My signature below is made on behalf of the patient pursuant to 42 CFR §424 36(b)(4)  In accordance with 42 CFR §424 37, the specific reason(s) that the patient is physically or mentally incapable of signing the claim form is as follows: N/A        Signature of Physician* or Healthcare Professional______________________________________________________________  Signature Date 10/15/19 (For scheduled repetitive transports, this form is not valid for transports performed more than 60 days after this date)    Printed Name & Credentials of Physician or Healthcare Professional (MD, DO, RN, etc )___Loretta Hill Backus_____________________________  *Form must be signed by patient's attending physician for scheduled, repetitive transports   For non-repetitive, unscheduled ambulance transports, if unable to obtain the signature of the attending physician, any of the following may sign (choose appropriate option below)  [] Physician Assistant []  Clinical Nurse Specialist []  Registered Nurse  []  Nurse Practitioner  [x] Discharge Planner

## 2019-10-15 NOTE — PHYSICAL THERAPY NOTE
Physical Therapy Treatment Note     10/15/19 0823   Pain Assessment   Pain Assessment No/denies pain   Pain Score No Pain   Restrictions/Precautions   Weight Bearing Precautions Per Order No   Braces or Orthoses   (none per pt)   Other Precautions Cognitive; Chair Alarm; Bed Alarm;Multiple lines; Fall Risk;Hard of hearing;O2   General   Chart Reviewed Yes   Response to Previous Treatment Patient with no complaints from previous session  Family/Caregiver Present No   Cognition   Overall Cognitive Status Impaired   Arousal/Participation Alert; Cooperative   Attention Attends with cues to redirect   Orientation Level Oriented to person;Oriented to place; Disoriented to situation;Disoriented to time   Memory Decreased recall of recent events;Decreased short term memory   Following Commands Follows one step commands with increased time or repetition   Comments pt agreeable to PT session   Subjective   Subjective "I want to eat breakfast"   Bed Mobility   Supine to Sit 4  Minimal assistance   Additional items Assist x 1;HOB elevated; Bedrails; Increased time required;Verbal cues   Additional Comments pt requiring frequent encouragement throughout session 2* fatigue   Transfers   Sit to Stand 4  Minimal assistance  (CGA)   Additional items Assist x 1; Increased time required;Verbal cues   Stand to Sit 4  Minimal assistance  (CGA)   Additional items Assist x 1; Increased time required;Verbal cues   Additional Comments vc for proper sequencing & positioning closer with RW   Ambulation/Elevation   Gait pattern Forward Flexion;Decreased foot clearance; Excessively slow; Short stride; Shuffling; Inconsistent randi   Gait Assistance 4  Minimal assist   Additional items Assist x 1;Verbal cues   Assistive Device Rolling walker   Distance 40'; pt declining further distance at this time; encouraged NSG to ambulate pt later this date to tolerance   Stair Management Assistance Not tested   Balance   Static Sitting Good   Dynamic Sitting Fair + Static Standing Fair   Dynamic Standing Fair -   Ambulatory Fair -   Endurance Deficit   Endurance Deficit Yes   Activity Tolerance   Activity Tolerance Patient limited by fatigue   Nurse Made Aware RN Liz Jalloh verbalized pt appropriate to see, made aware of session outcome/recs   Exercises   Heelslides Sitting;10 reps;AROM; Bilateral   Knee AROM Long Arc Quad Sitting;10 reps;AROM; Bilateral   Ankle Pumps Sitting;10 reps;AROM; Bilateral   Marching Sitting;10 reps;AROM; Bilateral   Assessment   Prognosis Good   Problem List Decreased strength;Decreased range of motion;Decreased endurance; Impaired balance;Decreased mobility; Decreased cognition;Decreased safety awareness; Impaired hearing   Assessment Pt seen for PT treatment session this date with interventions consisting of gait training w/ emphasis on improving pt's ability to ambulate level surfaces x 40' with min A provided by therapist with RW and Therapeutic exercise consisting of: AROM 10 reps B LE in sitting position  Pt agreeable to PT treatment session upon arrival, pt found supine in bed, in no apparent distress, A&O x 2 and responsive  Pt continues to require A x1 with all phases of mobility, maximal vc for sequencing & safety with RW  Pt able to continue seated B LE exercises to tolerance without pain reported  Post session: pt returned back to recliner, chair alarm engaged, all needs in reach and RN notified of session findings/recommendations  Recommend STR at time of d/c in order to maximize pt's functional independence and safety w/ mobility  Pt continues to be functioning below baseline level, and remains limited 2* factors listed above  PT will continue to see pt while here in order to address the deficits listed above and provide interventions consistent w/ POC in effort to achieve STGs     Barriers to Discharge Other (Comment)  (pt resident of [facility])   Goals   Patient Goals to eat breakfast   STG Expiration Date 10/16/19   Short Term Goal #1 STGs remain appropriate   PT Treatment Day 3   Plan   Treatment/Interventions Functional transfer training;LE strengthening/ROM; Therapeutic exercise; Endurance training;Elevations;Cognitive reorientation;Patient/family training;Equipment eval/education; Bed mobility;Gait training;Spoke to nursing   Progress Progressing toward goals   PT Frequency   (3-5x/wk)   Recommendation   Recommendation Short-term skilled PT  (vs return to [facility] PT services if able to clear stairs)   Equipment Recommended Walker  (RW)   PT - OK to Discharge Yes  (when medically cleared if to STR)     Darya Dia, PT, DPT    Time of PT treatment session: [phone]

## 2019-10-15 NOTE — SOCIAL WORK
CM spoke with Ivette Murillo from [place] and the pt has been accepted  Cm spoke with Bonnell Claude and the pt will be transported to [facility] via BLS at 4:30pm today

## 2019-10-15 NOTE — ASSESSMENT & PLAN NOTE
· History of cerebral aneurysm status post clipping in 1996  Subsequently has migraine headaches  Patient does follow up with [facility] and is maintained on Carbamazepine (as anti seizure) and Depakote (for mood stabilization)  · "bubble" sensation in the front of the head 10/8, unclear etiology; neuro exam nonfocal   · STAT CT head 10/8 showed no acute changes  Headache since resolved    · Depakote level low, Tegretol level normal

## 2019-10-15 NOTE — PLAN OF CARE
Problem: OCCUPATIONAL THERAPY ADULT  Goal: Performs self-care activities at highest level of function for planned discharge setting  See evaluation for individualized goals  Description  Treatment Interventions: ADL retraining, Functional transfer training, UE strengthening/ROM, Endurance training, Patient/family training, Equipment evaluation/education, Compensatory technique education, Continued evaluation, Cardiac education, Energy conservation, Activityengagement          See flowsheet documentation for full assessment, interventions and recommendations  Note:   Limitation: Decreased ADL status, Decreased Safe judgement during ADL, Decreased endurance, Decreased self-care trans, Decreased high-level ADLs  Prognosis: Good  Assessment: Patient is a 69 y o  female seen for OT evaluation s/p admit to [id number] [facility] on 10/3/2019 w/Acute on chronic diastolic congestive heart failure ([facility] [address] )  Commorbidities affecting patient's functional performance at time of assessment include: Atrial flutter, Urinary retention, h/o of cerebral aneurysm repair, PAD, hypothyroidism, HTN, Stage 3 Chronic Kidney disease, Dementia  Orders placed for OT evaluation and treatment  Performed at least two patient identifiers during session including name and wristband  Prior to admission, Patient unable to provide detailed history secondary to memory loss/ cognitive deficts  Patient is very pleasant, lives at [facility], 2nd floor apartment with 10 steps to main level/ dining room  Patient stated  that she gets OOB on her own and is able to toilet self, staff assists with showers, meals and medication management  Personal factors affecting patient at time of initial evaluation include: limited insight into deficits, decreased initiation and engagement, difficulty performing ADLs and difficulty performing IADLs   Upon evaluation, patient requires minimal  assist for UB ADLs, moderate assist for LB ADLs, transfers and functional ambulation in room and bathroom with moderate assist, with the use of Rolling Walker  Occupational performance is affected by the following deficits: orientation, attention span, decreased functional use of BUEs, decreased muscle strength, degenerative arthritic joint changes, impaired gross motor coordination, dynamic sit/ stand balance deficit with poor standing tolerance time for self care and functional mobility, decreased activity tolerance, impaired problem solving, decreased safety awareness and postural control and postural alignment deficit, requiring external assistance to complete transitional movements  Therapist completed  extensive additional review of medical records and additional review of physical, cognitive or psychosocial history, clinical examination identifying 5 or more performance deficits, clinical decision making of a high complexity , consistent with a high complexity level evaluation  Patient to benefit from continued Occupational Therapy treatment while in the hospital to address deficits as defined above and maximize level of functional independence with ADLs and functional mobility       OT Discharge Recommendation: Short Term Rehab

## 2019-10-15 NOTE — PLAN OF CARE
Problem: Potential for Falls  Goal: Patient will remain free of falls  Description  INTERVENTIONS:  - Assess patient frequently for physical needs  -  Identify cognitive and physical deficits and behaviors that affect risk of falls  -  [place] fall precautions as indicated by assessment   - Educate patient/family on patient safety including physical limitations  - Instruct patient to call for assistance with activity based on assessment  - Modify environment to reduce risk of injury  - Consider OT/PT consult to assist with strengthening/mobility  Outcome: Progressing     Problem: Prexisting or High Potential for Compromised Skin Integrity  Goal: Skin integrity is maintained or improved  Description  INTERVENTIONS:  - Identify patients at risk for skin breakdown  - Assess and monitor skin integrity  - Assess and monitor nutrition and hydration status  - Monitor labs   - Assess for incontinence   - Turn and reposition patient  - Assist with mobility/ambulation  - Relieve pressure over bony prominences  - Avoid friction and shearing  - Provide appropriate hygiene as needed including keeping skin clean and dry  - Evaluate need for skin moisturizer/barrier cream  - Collaborate with interdisciplinary team   - Patient/family teaching  - Consider wound care consult   Outcome: Progressing     Problem: SAFETY ADULT  Goal: Patient will remain free of falls  Description  INTERVENTIONS:  - Assess patient frequently for physical needs  -  Identify cognitive and physical deficits and behaviors that affect risk of falls    -  [place] fall precautions as indicated by assessment   - Educate patient/family on patient safety including physical limitations  - Instruct patient to call for assistance with activity based on assessment  - Modify environment to reduce risk of injury  - Consider OT/PT consult to assist with strengthening/mobility  Outcome: Progressing  Goal: Maintain or return to baseline ADL function  Description  INTERVENTIONS:  -  Assess patient's ability to carry out ADLs; assess patient's baseline for ADL function and identify physical deficits which impact ability to perform ADLs (bathing, care of mouth/teeth, toileting, grooming, dressing, etc )  - Assess/evaluate cause of self-care deficits   - Assess range of motion  - Assess patient's mobility; develop plan if impaired  - Assess patient's need for assistive devices and provide as appropriate  - Encourage maximum independence but intervene and supervise when necessary  - Involve family in performance of ADLs  - Assess for home care needs following discharge   - Consider OT consult to assist with ADL evaluation and planning for discharge  - Provide patient education as appropriate  Outcome: Progressing  Goal: Maintain or return mobility status to optimal level  Description  INTERVENTIONS:  - Assess patient's baseline mobility status (ambulation, transfers, stairs, etc )    - Identify cognitive and physical deficits and behaviors that affect mobility  - Identify mobility aids required to assist with transfers and/or ambulation (gait belt, sit-to-stand, lift, walker, cane, etc )  - [place] fall precautions as indicated by assessment  - Record patient progress and toleration of activity level on Mobility SBAR; progress patient to next Phase/Stage  - Instruct patient to call for assistance with activity based on assessment  - Consider rehabilitation consult to assist with strengthening/weightbearing, etc   Outcome: Progressing     Problem: DISCHARGE PLANNING  Goal: Discharge to home or other facility with appropriate resources  Description  INTERVENTIONS:  - Identify barriers to discharge w/patient and caregiver  - Arrange for needed discharge resources and transportation as appropriate  - Identify discharge learning needs (meds, wound care, etc )  - Arrange for interpretive services to assist at discharge as needed  - Refer to Case Management Department for coordinating discharge planning if the patient needs post-hospital services based on physician/advanced practitioner order or complex needs related to functional status, cognitive ability, or social support system  Outcome: Progressing     Problem: Knowledge Deficit  Goal: Patient/family/caregiver demonstrates understanding of disease process, treatment plan, medications, and discharge instructions  Description  Complete learning assessment and assess knowledge base    Interventions:  - Provide teaching at level of understanding  - Provide teaching via preferred learning methods  Outcome: Progressing     Problem: CARDIOVASCULAR - ADULT  Goal: Maintains optimal cardiac output and hemodynamic stability  Description  INTERVENTIONS:  - Monitor I/O, vital signs and rhythm  - Monitor for S/S and trends of decreased cardiac output  - Administer and titrate ordered vasoactive medications to optimize hemodynamic stability  - Assess quality of pulses, skin color and temperature  - Assess for signs of decreased coronary artery perfusion  - Instruct patient to report change in severity of symptoms  Outcome: Progressing  Goal: Absence of cardiac dysrhythmias or at baseline rhythm  Description  INTERVENTIONS:  - Continuous cardiac monitoring, vital signs, obtain 12 lead EKG if ordered  - Administer antiarrhythmic and heart rate control medications as ordered  - Monitor electrolytes and administer replacement therapy as ordered  Outcome: Progressing     Problem: GENITOURINARY - ADULT  Goal: Maintains or returns to baseline urinary function  Description  INTERVENTIONS:  - Assess urinary function  - Encourage oral fluids to ensure adequate hydration if ordered  - Administer IV fluids as ordered to ensure adequate hydration  - Administer ordered medications as needed  - Offer frequent toileting  - Follow urinary retention protocol if ordered  Outcome: Progressing  Goal: Absence of urinary retention  Description  INTERVENTIONS:  - Assess patients ability to void and empty bladder  - Monitor I/O  - Bladder scan as needed  - Discuss with physician/AP medications to alleviate retention as needed  - Discuss catheterization for long term situations as appropriate  Outcome: Progressing  Goal: Urinary catheter remains patent  Description  INTERVENTIONS:  - Assess patency of urinary catheter  - If patient has a chronic baca, consider changing catheter if non-functioning  - Follow guidelines for intermittent irrigation of non-functioning urinary catheter  Outcome: Progressing     Problem: MUSCULOSKELETAL - ADULT  Goal: Maintain or return mobility to safest level of function  Description  INTERVENTIONS:  - Assess patient's ability to carry out ADLs; assess patient's baseline for ADL function and identify physical deficits which impact ability to perform ADLs (bathing, care of mouth/teeth, toileting, grooming, dressing, etc )  - Assess/evaluate cause of self-care deficits   - Assess range of motion  - Assess patient's mobility  - Assess patient's need for assistive devices and provide as appropriate  - Encourage maximum independence but intervene and supervise when necessary  - Involve family in performance of ADLs  - Assess for home care needs following discharge   - Consider OT consult to assist with ADL evaluation and planning for discharge  - Provide patient education as appropriate  Outcome: Progressing

## 2019-10-15 NOTE — PLAN OF CARE
Problem: PHYSICAL THERAPY ADULT  Goal: Performs mobility at highest level of function for planned discharge setting  See evaluation for individualized goals  Description  Treatment/Interventions: Functional transfer training, LE strengthening/ROM, Therapeutic exercise, Endurance training, Elevations, Cognitive reorientation, Patient/family training, Equipment eval/education, Bed mobility, Gait training, Spoke to nursing  Equipment Recommended: Rodrigue Jones       See flowsheet documentation for full assessment, interventions and recommendations  Outcome: Progressing  Note:   Prognosis: Good  Problem List: Decreased strength, Decreased range of motion, Decreased endurance, Impaired balance, Decreased mobility, Decreased cognition, Decreased safety awareness, Impaired hearing  Assessment: Pt seen for PT treatment session this date with interventions consisting of gait training w/ emphasis on improving pt's ability to ambulate level surfaces x 40' with min A provided by therapist with RW and Therapeutic exercise consisting of: AROM 10 reps B LE in sitting position  Pt agreeable to PT treatment session upon arrival, pt found supine in bed, in no apparent distress, A&O x 2 and responsive  Pt continues to require A x1 with all phases of mobility, maximal vc for sequencing & safety with RW  Pt able to continue seated B LE exercises to tolerance without pain reported  Post session: pt returned back to recliner, chair alarm engaged, all needs in reach and RN notified of session findings/recommendations  Recommend STR at time of d/c in order to maximize pt's functional independence and safety w/ mobility  Pt continues to be functioning below baseline level, and remains limited 2* factors listed above  PT will continue to see pt while here in order to address the deficits listed above and provide interventions consistent w/ POC in effort to achieve STGs  Barriers to Discharge:  Other (Comment)(pt resident of [facility]) Recommendation: Short-term skilled PT(vs return to [facility] c PT services if able to clear stairs)     PT - OK to Discharge: Yes(when medically cleared if to STR)    See flowsheet documentation for full assessment

## 2019-10-15 NOTE — DISCHARGE SUMMARY
Discharge- Kai Stuart 1945, 69 y o  female MRN: [id number]    Unit/Bed#: -01 Encounter: [id number]    Primary Care Provider: Kortney Mendoza DO   Date and time admitted to hospital: 10/3/2019  3:21 PM        * Acute on chronic diastolic congestive heart failure [facility])  Assessment & Plan  Wt Readings from Last 3 Encounters:   10/14/19 80 3 kg (177 lb)   09/30/19 88 3 kg (194 lb 10 7 oz)   07/18/19 83 5 kg (184 lb)     · Acute on chronic diastolic congestive heart failure on admission  Patient is now euvolemic and restarted on her home dose of Lasix  · Echo shows an EF of 45%, study was technically difficult to evaluate for diastolic dysfunction secondary to tachycardia  · CXR showing cardiomegaly  Pro-BNP elevated 12k  Troponin <0 02   · Patient developed acute kidney injury while on aggressive diuresis, nephrology consulted for management  Her renal functions are now back to baseline, and cleared by Nephrology to restart 40 mg Lasix daily  She will need a BMP in 2-3 days outpatient  · Cardiology input appreciated, right heart catheterization 10/8 noted  · Daily weights, strict I/Os, low sodium diet  · Patient will also need close follow up with Cardiology in 1 week  Atrial flutter ([facility])  Assessment & Plan  · Atrial fibrillation/flutter with RVR at times, overall improved  · Cardiology input appreciated  On Metoprolol for rate control alone  · No acute events in 48 hrs and telemetry was discontinued  On Coumadin 3 mg at night  · INR is 2 39 on day of discharge, continue Coumadin 3 mg daily  She has intermittently required 1 mg doses due to near top normal INR  · INR per rehab  Essential hypertension  Assessment & Plan  · Essential hypertension, continue metoprolol  Controlled  PAD (peripheral artery disease) ([facility])  Assessment & Plan  · Peripheral artery disease status post lower extremity stenting  No evidence of claudication at this time      · Continue Clopidogrel and Atorvastatin  · Patient was due to have a follow-up ultrasound to ensure stents are patent on Friday - will need to be rescheduled outpatient  Hypothyroidism  Assessment & Plan  · Hypothyroidism, continue Levothyroxine, normal TSH  Stage 3 chronic kidney disease ([address] )  Assessment & Plan  · TAM on CKD worsened with Bumex drip which is since discontinued  · Nephrology consult appreciated, renal functions have significantly improved to baseline creatinine  · Per Nephrology, okay to resume Lasix 40mg po daily and repeat BMP in 3 days as outpatient  Results from last 7 days   Lab Units 10/15/19  0503 10/14/19  6679 10/14/19  0603 10/13/19  0509 10/12/19  0602 10/11/19  0519 10/09/19  0447   BUN mg/dL 60* 58* 64* 72* 66* 57* 42*   CREATININE mg/dL 1 66* 1 73* 1 69* 2 45* 2 72* 2 29* 1 78*   EGFR ml/min/1 73sq m 30 29 30 19 17 21 28       Urinary retention  Assessment & Plan  · Chronic urinary retention with catheter  Was recently seen in the emergency department on 9/30/2019 where she was started on Ciprofloxacin  Urine culture at that time showed E coli  Completed 3 day course antibiotics  Patient has remained afebrile and without leukocytosis throughout her hospitalization  Urine remains clear  Hyperlipidemia  Assessment & Plan  · Hyperlipidemia  Continue Atorvastatin  History of cerebral aneurysm repair  Assessment & Plan  · History of cerebral aneurysm status post clipping in 1996  Subsequently has migraine headaches  Patient does follow up with [facility] and is maintained on Carbamazepine (as anti seizure) and Depakote (for mood stabilization)  · "bubble" sensation in the front of the head 10/8, unclear etiology; neuro exam nonfocal   · STAT CT head 10/8 showed no acute changes  Headache since resolved  · Depakote level low, Tegretol level normal     Dementia (HCC)  Assessment & Plan  · Dementia without behavioral disturbance    Patient does have underlying depression and mood currently stable on Wellbutrin and Lorazepam   Also takes Depakote for mood stabilization  Ambulatory dysfunction  Assessment & Plan  · PT and OT worked with the patient, they are recommending rehab  Patient's daughter initially hesitant, however now agreeable to [facility] and patient has been accepted  Patient eventually will return to [facility] if able  · Case management involvement for discharge planning purposes  Discharging Physician / Practitioner: Baldomero Buck PA-C  PCP: Raenelle Schlatter, DO  Admission Date:   Admission Orders (From admission, onward)     Ordered        10/03/19 1901  Inpatient Admission  Once                   Discharge Date: 10/15/19    Resolved Problems  Date Reviewed: 10/15/2019    None          Consultations During Hospital Stay:  · Cardiology  · Nephrology  · PT/OT  · Case management    Procedures Performed:   · CXR  · CT head  · Echo  · Right heart catheterization    Significant Findings / Test Results:   · CXR clear, CT head unremarkable for acute findings  · Echo showed EF 45%, however difficult to assess secondary to the rapid ventricular response  · Right heart catheterization showed moderately elevated pulmonary capillary wedge pressure and mild pulmonary hypertension    Incidental Findings:   ·      Test Results Pending at Discharge (will require follow up):   ·      Outpatient Tests Requested:  · BMP in 2-3 days  · INR in 2-3 days  · PCP follow-up  · Nephrology follow-up   · Cardiology follow-up    Complications:  Acute kidney injury, deconditioning    Reason for Admission: acute CHF, aflutter with RVR    Hospital Course: Chula Castro is a 69 y o  female patient who originally presented to the hospital on 10/3/2019 due to weight gain and acute CHF with rapid ventricular rate AFib  Patient has dementia and is pleasantly confused at baseline, resides that [facility]  Her daughter is her power of       Patient brought in for diuresis, cardiology did not feel diuresis was adequate and eventually patient was placed on Bumex drip  Patient unfortunately developed acute kidney injury and all diuretics were discontinued  She is now recovered, her renal functions back to baseline  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  Patient will be maintained on her home dose of Lasix  She is euvolemic at time of discharge  She is pleasantly confused, at her baseline  Patient is physically deconditioned secondary to prolonged hospitalization, multiple underlying comorbidities, and age  She will need rehab prior to returning to her personal care home  The daughter is agreeable to [facility]  Patient is medically stable from a renal and cardiac standpoint for discharge today  Please see above list of diagnoses and related plan for additional information  Condition at Discharge: stable     Discharge Day Visit / Exam:     Subjective:  Patient seen this morning, she is pleasant and looks comfortable  She recalls meeting me before but can't recall my name  She knows she is in the hospital but does not remember why  Presently she denies any chest pain, shortness of breath, palpitations  She has a Davis catheter in place and denies any discomfort  She feels generally tired, but no worse than she can remember from yesterday  Vitals: Blood Pressure: 142/76 (10/15/19 0700)  Pulse: 101 (10/15/19 0700)  Temperature: (!) 97 4 °F (36 3 °C) (10/15/19 0700)  Temp Source: Oral (10/15/19 0700)  Respirations: 18 (10/15/19 0700)  Height: 5' 2" (157 5 cm)(previous ht assessment) (10/14/19 1203)  Weight - Scale: 80 3 kg (177 lb) (10/14/19 1200)  SpO2: 95 % (10/15/19 0700)  Exam:   Physical Exam   Constitutional: She appears well-developed and well-nourished  No distress  HENT:   Mouth/Throat: Oropharynx is clear and moist    Cardiovascular: Normal rate, S1 normal, S2 normal, normal heart sounds and intact distal pulses     No murmur heard   Pulmonary/Chest: Effort normal and breath sounds normal  No respiratory distress  She has no wheezes  She has no rales  Abdominal: Soft  Bowel sounds are normal  She exhibits no distension  There is no tenderness  Genitourinary:   Genitourinary Comments: Davis catheter, draining clear yellow urine   Musculoskeletal: She exhibits no edema  Neurological: She is alert  She is disoriented (Pleasantly disoriented, alert to self and location)  Nursing note and vitals reviewed  Discussion with Family:  No family present, case management contact with the daughter who is well-versed in the discharge plan    Discharge instructions/Information to patient and family:   See after visit summary for information provided to patient and family  Provisions for Follow-Up Care:  See after visit summary for information related to follow-up care and any pertinent home health orders  Disposition:     Autumn Ortega Flo at [facility]    Planned Readmission:  None     Discharge Statement:  I spent approximately 26 minutes discharging the patient  This time was spent on the day of discharge  I had direct contact with the patient on the day of discharge  Greater than 50% of the total time was spent examining patient, answering all patient questions, arranging and discussing plan of care with patient as well as directly providing post-discharge instructions  Additional time then spent on discharge activities  Discharge Medications:  See after visit summary for reconciled discharge medications provided to patient and family        ** Please Note: This note has been constructed using a voice recognition system **

## 2019-10-15 NOTE — OCCUPATIONAL THERAPY NOTE
Occupational Therapy Evaluation        Patient Name: Merritt Sheppard  NVWBF'C Date: 10/15/2019       10/15/19 4795   Note Type   Note type Eval only   Restrictions/Precautions   Weight Bearing Precautions Per Order No   Braces or Orthoses Other (Comment)  (none reported)   Other Precautions Cognitive; Chair Alarm; Bed Alarm;Telemetry; Fall Risk;Hard of hearing   Pain Assessment   Pain Assessment No/denies pain   Pain Score No Pain   Home Living   Type of Home Assisted living  [facility] care [facility])   Home Layout Two level;Bed/bath upstairs  (10 Steps to bedroom/ bathroom and dining room)   Bathroom Shower/Tub Walk-in shower   Bathroom Toilet Raised   Bathroom Equipment Grab bars in shower;Built-in shower seat;Grab bars around toilet   [address] [facility]   Additional Comments ambulates with RW   Prior Function   Level of [place] Needs assistance with IADLs; Needs assistance with ADLs and functional mobility   Lives With Alone   Receives Help From Personal care attendant   ADL Assistance Needs assistance   IADLs Needs assistance   Falls in the last 6 months 1 to 4   Vocational Retired   Lifestyle   Autonomy Patient unable to provide detailed history secondary to memory loss/ cognitive deficts  Patient is very pleasant, lives at [facility], 2nd floor apartment with 10 steps to main level/ dining room  Patient stated  that she gets OOB on her own and is able to toilet self, staff assists with showers, meals and medication management  Reciprocal Relationships Supportive staff and family   Intrinsic Gratification Enjoys activities at [address] ([place]) WDL   Patient Behaviors/Mood Calm; Cooperative;Pleasant   ADL   Eating Assistance 5  Supervision/Setup   Grooming Assistance 4  Minimal Assistance   UB Bathing Assistance 4  Minimal Assistance   LB Bathing Assistance 2  Maximal Assistance   [address] [facility] 4  Minimal Assistance LB Dressing Assistance 2  Maximal Assistance   Toileting Assistance  3  Moderate Assistance   Functional Assistance 3  Moderate Assistance   Additional Comments A fib with exertion noted during functional mobility  Bed Mobility   Additional Comments received patient OOB to Recliner, Nursing reported assist of 1 with walker  Transfers   Sit to Stand 5  Supervision   Additional items Increased time required;Verbal cues; Assist x 1   Stand to Sit 5  Supervision   Additional items Assist x 1;Verbal cues; Increased time required   Toilet transfer 4  Minimal assistance   Additional items Assist x 1; Increased time required;Verbal cues;Standard toilet   Functional Mobility   Functional Mobility 4  Minimal assistance   Additional items Rolling walker   Balance   Static Sitting Good   Dynamic Sitting Fair +   Static Standing Fair   Dynamic Standing Fair -   Activity Tolerance   Activity Tolerance Patient limited by fatigue   Nurse Made Aware TARIQ Foster verbalized patient appropriate for therapy, made aware of fluctuations in heart rate and exertion during functional mobility  Patient has tendency of removing O2 canula  RUE Assessment   RUE Assessment X  (AROM grossly intact, strength deficit)   RUE Strength   RUE Overall Strength Deficits  (3?5)   LUE Assessment   LUE Assessment X  (AROM grossly WFL, strength deficit)   LUE Strength   LUE Overall Strength Deficits  (3+/5)   Hand Function   Gross Motor Coordination Impaired   Fine Motor Coordination Functional   Sensation   Light Touch No apparent deficits  (BUEs)   Proprioception   Proprioception No apparent deficits   Vision-Basic Assessment   Current Vision Wears glasses all the time   Patient Visual Report Other (Comment)  (No significant changes reported)   Cognition   Overall Cognitive Status Impaired   Arousal/Participation Alert; Cooperative   Attention Attends with cues to redirect   Orientation Level Oriented to person;Disoriented to place; Disoriented to time;Disoriented to situation   Memory Decreased recall of recent events;Decreased short term memory   Following Commands Follows one step commands with increased time or repetition   Assessment   Limitation Decreased ADL status; Decreased Safe judgement during ADL;Decreased endurance;Decreased self-care trans;Decreased high-level ADLs   Prognosis Good   Assessment Patient is a 69 y o  female seen for OT evaluation s/p admit to [address] [facility] on 10/3/2019 w/Acute on chronic diastolic congestive heart failure ([facility] [address] )  Commorbidities affecting patient's functional performance at time of assessment include: Atrial flutter, Urinary retention, h/o of cerebral aneurysm repair, PAD, hypothyroidism, HTN, Stage 3 Chronic Kidney disease, Dementia  Orders placed for OT evaluation and treatment  Performed at least two patient identifiers during session including name and wristband  Prior to admission, Patient unable to provide detailed history secondary to memory loss/ cognitive deficts  Patient is very pleasant, lives at [facility], 2nd floor apartment with 10 steps to main level/ dining room  Patient stated  that she gets OOB on her own and is able to toilet self, staff assists with showers, meals and medication management  Personal factors affecting patient at time of initial evaluation include: limited insight into deficits, decreased initiation and engagement, difficulty performing ADLs and difficulty performing IADLs  Upon evaluation, patient requires minimal  assist for UB ADLs, moderate assist for LB ADLs, transfers and functional ambulation in room and bathroom with moderate assist, with the use of Rolling Walker   Occupational performance is affected by the following deficits: orientation, attention span, decreased functional use of BUEs, decreased muscle strength, degenerative arthritic joint changes, impaired gross motor coordination, dynamic sit/ stand balance deficit with poor standing tolerance time for self care and functional mobility, decreased activity tolerance, impaired problem solving, decreased safety awareness and postural control and postural alignment deficit, requiring external assistance to complete transitional movements  Therapist completed  extensive additional review of medical records and additional review of physical, cognitive or psychosocial history, clinical examination identifying 5 or more performance deficits, clinical decision making of a high complexity , consistent with a high complexity level evaluation  Patient to benefit from continued Occupational Therapy treatment while in the hospital to address deficits as defined above and maximize level of functional independence with ADLs and functional mobility  Goals   Patient Goals none reported   Plan   Treatment Interventions ADL retraining;Functional transfer training;UE strengthening/ROM; Endurance training;Patient/family training;Equipment evaluation/education; Compensatory technique education;Continued evaluation;Cardiac education; Energy conservation; Activityengagement   Goal Expiration Date 10/29/19   OT Frequency 3-5x/wk   Recommendation   OT Discharge Recommendation Short Term Rehab   Barthel Index   Feeding 5   Bathing 0   Grooming Score 5   Dressing Score 5   Bladder Score 0   Bowels Score 10   Toilet Use Score 5   Transfers (Bed/Chair) Score 10   Mobility (Level Surface) Score 10   Stairs Score 0   Barthel Index Score 50   Modified Ginny Scale   Modified [place] Scale 4     Occupational Performance areas to address include: bathing/ shower, dressing, toilet hygiene, transfer to all surfaces, functional mobility, community mobility, emergency response, IADLs: safety procedures, Leisure Participation and Social participation   From OT standpoint, recommendation at time of d/c would be Short Term Rehab         1 - Patient will verbalize and demonstrate use of energy conservation/ deep breathing technique and work simplification skills during functional activity with no verbal cues  2 - Patient will verbalize and demonstrate good body mechanics and joint protection techniques during  ADLs/ IADLs with no verbal cues  3 - Patient will increase OOB/ sitting tolerance to 2-4 hours per day for increased participation in self care and leisure tasks with no s/s of exertion  4 - Patient will increase standing tolerance time to 5  minutes with unilateral UE support to complete sink level ADLs@ mod I level  5 - Patient will increase sitting tolerance at edge of bed to 20 minutes to complete UB ADLs @ set up assist level  6 - Patient will transfer bed to Chair / toilet at Set up assist level with AD as indicated  7 - Patient will complete UB ADLs with set up assist      8 - Patient will complete LB ADLs with min assist with the use of adaptive equipment       9 - Patient will complete toileting hygiene with set up assist/ supervision for thoroughness    10 - Patient/ Family  will demonstrate competency with UE Home Exercise Program

## 2019-10-15 NOTE — DISCHARGE INSTR - AVS FIRST PAGE
Lab Results   Component Value Date    BUN 60 (H) 10/15/2019    CREATININE 1 66 (H) 10/15/2019    EGFR 30 10/15/2019    INR 2 39 (H) 10/15/2019

## 2019-10-15 NOTE — ASSESSMENT & PLAN NOTE
· Chronic urinary retention with catheter  Was recently seen in the emergency department on 9/30/2019 where she was started on Ciprofloxacin  Urine culture at that time showed E coli  Completed 3 day course antibiotics  Patient has remained afebrile and without leukocytosis throughout her hospitalization  Urine remains clear

## 2019-10-15 NOTE — ASSESSMENT & PLAN NOTE
· Atrial fibrillation/flutter with RVR at times, overall improved  · Cardiology input appreciated  On Metoprolol for rate control alone  · No acute events in 48 hrs and telemetry was discontinued  On Coumadin 3 mg at night  · INR is 2 39 on day of discharge, continue Coumadin 3 mg daily  She has intermittently required 1 mg doses due to near top normal INR  · INR per rehab

## 2019-10-25 ENCOUNTER — TELEPHONE (OUTPATIENT)
Dept: CARDIOLOGY CLINIC | Facility: CLINIC | Age: 74
End: 2019-10-25

## 2019-10-25 ENCOUNTER — CONSULT (OUTPATIENT)
Dept: CARDIOLOGY CLINIC | Facility: CLINIC | Age: 74
End: 2019-10-25
Payer: MEDICARE

## 2019-10-25 ENCOUNTER — TELEPHONE (OUTPATIENT)
Dept: SURGERY | Facility: HOSPITAL | Age: 74
End: 2019-10-25

## 2019-10-25 VITALS
WEIGHT: 183 LBS | BODY MASS INDEX: 33.68 KG/M2 | HEART RATE: 120 BPM | OXYGEN SATURATION: 97 % | SYSTOLIC BLOOD PRESSURE: 118 MMHG | DIASTOLIC BLOOD PRESSURE: 80 MMHG | HEIGHT: 62 IN

## 2019-10-25 DIAGNOSIS — I50.33 ACUTE ON CHRONIC DIASTOLIC CONGESTIVE HEART FAILURE (HCC): ICD-10-CM

## 2019-10-25 DIAGNOSIS — I48.20 CHRONIC ATRIAL FIBRILLATION (HCC): ICD-10-CM

## 2019-10-25 DIAGNOSIS — I48.92 ATRIAL FLUTTER (HCC): ICD-10-CM

## 2019-10-25 DIAGNOSIS — I48.92 ATRIAL FLUTTER, UNSPECIFIED TYPE (HCC): Primary | ICD-10-CM

## 2019-10-25 DIAGNOSIS — I48.20 CHRONIC A-FIB (HCC): ICD-10-CM

## 2019-10-25 DIAGNOSIS — I50.33 ACUTE ON CHRONIC DIASTOLIC (CONGESTIVE) HEART FAILURE (HCC): ICD-10-CM

## 2019-10-25 PROCEDURE — 99213 OFFICE O/P EST LOW 20 MIN: CPT | Performed by: INTERNAL MEDICINE

## 2019-10-25 PROCEDURE — 93010 ELECTROCARDIOGRAM REPORT: CPT | Performed by: INTERNAL MEDICINE

## 2019-10-25 RX ORDER — SELENIUM 50 MCG
1 TABLET ORAL DAILY
COMMUNITY
End: 2021-06-25 | Stop reason: HOSPADM

## 2019-10-25 RX ORDER — METOPROLOL TARTRATE 100 MG/1
100 TABLET ORAL EVERY 12 HOURS SCHEDULED
Qty: 180 TABLET | Refills: 3 | Status: ON HOLD | OUTPATIENT
Start: 2019-10-25 | End: 2021-07-24 | Stop reason: SDUPTHER

## 2019-10-25 RX ORDER — FUROSEMIDE 80 MG
80 TABLET ORAL DAILY
Qty: 90 TABLET | Refills: 0 | Status: SHIPPED | OUTPATIENT
Start: 2019-10-25 | End: 2019-10-28 | Stop reason: HOSPADM

## 2019-10-25 RX ORDER — FUROSEMIDE 80 MG
80 TABLET ORAL DAILY
Qty: 90 TABLET | Refills: 3 | Status: SHIPPED | OUTPATIENT
Start: 2019-10-25 | End: 2019-10-25 | Stop reason: SDUPTHER

## 2019-10-25 RX ORDER — FUROSEMIDE 80 MG
80 TABLET ORAL DAILY
Qty: 90 TABLET | Refills: 3 | Status: CANCELLED | OUTPATIENT
Start: 2019-10-25

## 2019-10-25 NOTE — LETTER
October 25, 2019     Kenya Delgado, [address] [place] [address]    Patient: Stanislaw Dickson   YOB: 1945   Date of Visit: 10/25/2019       Dear Dr Margaret Miller: Thank you for referring Stanislaw Dickson to me for evaluation  Below are my notes for this consultation  If you have questions, please do not hesitate to call me  I look forward to following your patient along with you  Sincerely,        Hue Arnold DO        CC: No Recipients  Hue Arnold DO  10/25/2019 10:43 AM  Sign at close encounter                                             Cardiology Consultation     Stanislaw Dickson  [id number]  1945  [address] [facility]  [address] [facility]    Dear Dr Priyanka De Leon is a 71-year-old female who follows with the [address] [place] [address] Place of Cardiology in [facility] with the following issues:    1  Atrial flutter/fibrillation  2  Heart failure with mildly reduced ejection fraction  3  Systolic cardiomyopathy, ejection fraction 45%  4  Peripheral arterial disease status post complex lower extremity revascularization and covered stent placement after stent fracture  5  History of coronary artery disease status post prior PCI  6  Chronic kidney disease stage 3  7  Dementia    Missy Grubbs was recently hospitalized in [facility] for acute decompensated heart failure as well as atrial flutter with rapid ventricular response  Her atrial flutter is notoriously difficult to control  To control  There was some question about her volume status and she did undergo a right heart catheterization which demonstrated elevated filling pressures  She was aggressively diuresed and actually developed acute on chronic kidney injury from intravascular volume depletion  She was gently rehydrated and ultimately discharged to rehab  At rehab, she has done fairly well    Her daughter presents with her today and relates that her heart rate has been relatively well controlled up until Monday of this week  Her heart rate jumped up to 120 and she was prescribed digoxin  Unfortunately, digoxin does not show up anywhere on her medication list and there is no documentation in our chart that our cardiology office was ever contacted  The patient does admit to feeling short of breath when she ambulates  She did walk up are stairs to the office today and felt as though her heart was pounding  It took approximately 15 minutes for her to return to baseline        Patient Active Problem List   Diagnosis    HTN (hypertension)    COPD (chronic obstructive pulmonary disease) [facility]    Acute on chronic diastolic congestive heart failure (HCC)    Atrial flutter (HCC)    Back pain    Dementia ([facility])    Hyperlipidemia    Urinary tract infection associated with catheterization of urinary tract ([facility]    RSV infection    Ambulatory dysfunction    Hypernatremia    Stage 3 chronic kidney disease ([facility]    Lower extremity edema    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    JESENIA (obstructive sleep apnea)    Intermittent claudication of right lower extremity due to atherosclerosis ([facility])    Atrial fibrillation (HCC)    Chronic indwelling Davis catheter    Bradycardia    Essential hypertension    Peripheral artery disease ([facility])    Macrocytic anemia    Aortoiliac occlusive disease ([facility]    HCAP (healthcare-associated pneumonia)    Coronary artery disease involving native coronary artery of native heart without angina pectoris    Hypothyroidism    PAD (peripheral artery disease) ([facility]    History of cerebral aneurysm repair    Urinary retention    TAM (acute kidney injury) ([facility]    Hypertensive kidney disease with stage 3 chronic kidney disease ([facility])    Azotemia     Past Medical History:   Diagnosis Date    Atrial fibrillation ([facility]     Brain aneurysm     CAD (coronary artery disease)     Cardiac disease     had stents 12 years ago [facility]    CKD (chronic kidney disease) stage 3, GFR 30-59 ml/min ([facility]) ckd    COPD (chronic obstructive pulmonary disease) (HCC)     Dementia (HCC)     Diastolic CHF, chronic (HCC)     GERD (gastroesophageal reflux disease)     Hyperlipidemia     Hyperlipidemia     Hypertension     Hypothyroidism     Migraine     PAD (peripheral artery disease) ([facility])     Renal disorder     Seizures ([facility]     Stroke ([facility] )      Social History     Socioeconomic History    Marital status:      Spouse name: Not on file    Number of children: 4    Years of education: Not on file    Highest education level: Not on file   Occupational History    Occupation: retired   Social Needs    Financial resource strain: Not on file    Food insecurity:     Worry: Not on file     Inability: Not on file   Natero needs:     Medical: Not on file     Non-medical: Not on file   Tobacco Use    Smoking status: Former Smoker     Packs/day: 5 00     Years: 40 00     Pack years: 200 00     Last attempt to quit: 2007     Years since quittin 1    Smokeless tobacco: Never Used   Substance and Sexual Activity    Alcohol use: Never     Alcohol/week: 0 0 standard drinks     Frequency: Never    Drug use: No    Sexual activity: Not Currently   Lifestyle    Physical activity:     Days per week: Not on file     Minutes per session: Not on file    Stress: Not on file   Relationships    Social connections:     Talks on phone: Not on file     Gets together: Not on file     Attends Muslim service: Not on file     Active member of club or organization: Not on file     Attends meetings of clubs or organizations: Not on file     Relationship status: Not on file    Intimate partner violence:     Fear of current or ex partner: Not on file     Emotionally abused: Not on file     Physically abused: Not on file     Forced sexual activity: Not on file   Other Topics Concern    Not on file   Social History Narrative    Not on file      Family History   Problem Relation Age of Onset    Heart disease Father     Parkinsonism Father     Macular degeneration Mother     Glaucoma Mother     Diabetes Brother     Heart disease Brother      Past Surgical History:   Procedure Laterality Date    APPENDECTOMY      ARTERIOGRAM N/A 12/7/2018    Procedure: ARTERIOGRAM WITH STENT PLACEMENT;  Surgeon: Dara Reddy MD;  [facility];  Service: Vascular    BLADDER TUMOR EXCISION      BRAIN SURGERY      1998  clip right frontal lobe    CARDIAC SURGERY      COLONOSCOPY      CORONARY STENT PLACEMENT      5 stents    EYE SURGERY      IR ABDOMINAL ANGIOGRAPHY / INTERVENTION  12/7/2018    MANDIBLE FRACTURE SURGERY      TONSILLECTOMY      TUBAL LIGATION      UTERINE FIBROID SURGERY         Current Outpatient Medications:     Acetaminophen 325 MG CAPS, Take by mouth, Disp: , Rfl:     albuterol (PROVENTIL HFA,VENTOLIN HFA) 90 mcg/act inhaler, Inhale 2 puffs every 6 (six) hours as needed for wheezing, Disp: 1 Inhaler, Rfl: 0    atorvastatin (LIPITOR) 10 mg tablet, Take 1 tablet (10 mg total) by mouth daily, Disp: 30 tablet, Rfl: 0    buPROPion (WELLBUTRIN SR) 150 mg 12 hr tablet, Take 1 tablet (150 mg total) by mouth daily, Disp: 30 tablet, Rfl: 0    carBAMazepine (TEGretol) 100 mg chewable tablet, Chew 1 tablet (100 mg total) 3 (three) times a day, Disp: 90 tablet, Rfl: 0    Citric Ge-Uhpyotkdvtj-Kn Carb (RENACIDIN) SOLN, Indications: 60ml to irrigate baca once a week on Friday, Disp: , Rfl:     clopidogrel (PLAVIX) 75 mg tablet, Take 1 tablet (75 mg total) by mouth daily, Disp: 90 tablet, Rfl: 0    fenofibrate micronized (LOFIBRA) 67 MG capsule, Take 1 capsule (67 mg total) by mouth daily with breakfast, Disp: 15 capsule, Rfl: 0    ferrous sulfate 325 (65 Fe) mg tablet, Take 1 tablet (325 mg total) by mouth 2 (two) times a day, Disp: 30 tablet, Rfl: 0   fluticasone-vilanterol (BREO ELLIPTA) 100-25 mcg/inh inhaler, Inhale 1 puff daily, Disp: 1 Inhaler, Rfl: 0    furosemide (LASIX) 40 mg tablet, Take 1 tablet (40 mg total) by mouth daily, Disp: 30 tablet, Rfl: 11    lactobacillus acidophilus, Take 1 capsule by mouth daily, Disp: , Rfl:     levothyroxine 88 mcg tablet, Take 1 tablet (88 mcg total) by mouth daily, Disp: 14 tablet, Rfl: 0    LORazepam (ATIVAN) 0 5 mg tablet, Take 1 tablet (0 5 mg total) by mouth 2 (two) times a day for 5 doses, Disp: 20 tablet, Rfl: 0    metoprolol tartrate (LOPRESSOR) 50 mg tablet, Take 1 tablet (50 mg total) by mouth every 12 (twelve) hours, Disp: 60 tablet, Rfl: 0    nitroglycerin (NITROSTAT) 0 4 mg SL tablet, Place 1 tablet (0 4 mg total) under the tongue every 5 (five) minutes as needed for chest pain, Disp: 90 tablet, Rfl: 0    potassium chloride (K-DUR,KLOR-CON) 20 mEq tablet, Take 1 tablet (20 mEq total) by mouth daily, Disp: 15 tablet, Rfl: 0    Sennosides-Docusate Sodium (SENNA PLUS PO), Take by mouth daily as needed, Disp: , Rfl:     betamethasone dipropionate (DIPROSONE) 0 05 % cream, Apply topically 3 (three) times a day, Disp: , Rfl:     Cholecalciferol (VITAMIN D) 2000 units CAPS, Take 1 capsule (2,000 Units total) by mouth daily (Patient not taking: Reported on 10/25/2019), Disp: 14 capsule, Rfl: 0    clotrimazole (LOTRIMIN) 1 % cream, Apply topically 3 (three) times a day, Disp: , Rfl:     divalproex sodium (DEPAKOTE ER) 250 mg 24 hr tablet, Take 1 tablet (250 mg total) by mouth 2 (two) times a day (Patient not taking: Reported on 10/25/2019), Disp: , Rfl:     saccharomyces boulardii (FLORASTOR) 250 mg capsule, Take 1 capsule (250 mg total) by mouth 2 (two) times a day (Patient not taking: Reported on 10/25/2019), Disp: , Rfl: 0    warfarin (COUMADIN) 3 mg tablet, One tablet daily or as otherwise directed   (Patient not taking: Reported on 10/25/2019), Disp: 30 tablet, Rfl: 0       Allergies   Allergen Reactions    Spiriva Handihaler [Tiotropium Bromide Monohydrate] Swelling    Ramipril Facial Swelling    Penicillins      unsure     Vitals:    10/25/19 0957   BP: 118/80   Pulse: (!) 120   SpO2: 97%   Weight: 83 kg (183 lb)   Height: 5' 2" (1 575 m)       Labs:  Results for Elizabeth George (MRN [id number]) as of 10/25/2019 10:24   10/15/2019 05:03   Sodium 145   Potassium 3 6   Chloride 106   CO2 32   Anion Gap 7   BUN 60 (H)   Creatinine 1 66 (H)   Glucose, Random 115   Calcium 10 2 (H)   eGFR 30     Results for Elizabeth George (MRN [id number]) as of 10/25/2019 10:24   10/15/2019 05:03   WBC 5 28   Red Blood Cell Count 3 90   Hemoglobin 12 9   HCT 40 1    (H)   MCH 33 1   MCHC 32 2   RDW 12 5   Platelet Count 490 (L)     Results for Elizabeth George (MRN [id number]) as of 10/25/2019 10:24   10/15/2019 05:03   Protime 26 3 (H)   INR 2 39 (H)         Imaging:  Echocardiogram, 10/04/2019  IMPRESSIONS:  The patient has an arrhythmia with rapid ventricular response; this may result in an apparent reduction in LV function due to inadequate preload, or reversible tachycardia-mediated reduction in LV function  Consider repeat examination once the arrhythmia is controlled  Right heart catheterization, 10/08/2019  SUMMARY  HEMODYNAMICS:  Hemodynamic assessment demonstrated moderately elevated pulmonary capillary wedge pressure  There was mild pulmonary hypertension  Review of Systems:  Review of Systems   Unable to perform ROS: Dementia       /80   Pulse (!) 120   Ht 5' 2" (1 575 m)   Wt 83 kg (183 lb)   SpO2 97%   BMI 33 47 kg/m²      Physical Exam:  Physical Exam   Constitutional: She is oriented to person, place, and time  She appears well-developed and well-nourished  HENT:   Head: Normocephalic and atraumatic  Eyes: Conjunctivae and EOM are normal    Neck: JVD (To the angle of the mandible while in an upright position) present  Carotid bruit is not present   No tracheal deviation present  No thyromegaly present  Cardiovascular: Normal rate, regular rhythm, S1 normal and S2 normal   No extrasystoles are present  PMI is not displaced  Exam reveals no gallop  No murmur heard  Pulses:       Carotid pulses are 2+ on the right side, and 2+ on the left side  Radial pulses are 2+ on the right side, and 2+ on the left side  Pulmonary/Chest: Breath sounds normal  No accessory muscle usage  No respiratory distress  Abdominal: Soft  Bowel sounds are normal  She exhibits no distension  There is no tenderness  Musculoskeletal: She exhibits edema (1+ pitting, bilateral lower extremities)  Lymphadenopathy:        Head (right side): No submental, no submandibular, no tonsillar, no preauricular, no posterior auricular and no occipital adenopathy present  Head (left side): No submental, no submandibular, no tonsillar, no preauricular, no posterior auricular and no occipital adenopathy present  Right cervical: No superficial cervical adenopathy present  Left cervical: No superficial cervical adenopathy present  Neurological: She is alert and oriented to person, place, and time  Skin: Skin is warm and dry  Discussion/Summary:  Atrial flutter/fibrillation, rapid ventricular response  Heart failure with mildly reduced ejection fraction  Systolic cardiomyopathy, ejection fraction 45%  Peripheral arterial disease status post complex lower extremity revascularization and covered stent placement after stent fracture  History of coronary artery disease status post prior PCI  Chronic kidney disease stage 3  Dementia    She appears to be in a flutter with RVR  We will confirm with an EKG  This is similar to some of the experience to having in the hospital   Think at this point, given her adequate anticoagulation over the past 3 weeks, we should schedule her for DC cardioversion on Monday, 10/28/2018    Additionally, we will increase her metoprolol to 100 mg vicky murphy  Wisam Heredia Physical exam also is suggestive of decompensated heart failure with elevated JVD and pitting lower extremity edema  Increase furosemide to 80 mg daily  Repeat BMP in 1 week to monitor kidney function and electrolytes  She may ultimately require amiodarone again for control of her rate  Another possibility would include atrial flutter ablation courtesy of electrophysiology  I would like her to return for an EKG in 1 week  If this continues to be a problem, we will make a referral to EP  Continue on warfarin as well as clopidogrel given her complex stenting procedure  I would like her to return to our office within the next 1-2 months for re-evaluation  If she continues to be in decompensated atrial flutter or heart failure, that will prompt further action  Thank you for the opportunity to consult on this patient  If you have any questions, please feel free to call my office

## 2019-10-25 NOTE — PROGRESS NOTES
Cardiology Consultation     Gael Reyes  [id number]  1945  [address] [facility] [address]    Dear Dr Thierno Ch is a 80-year-old female who follows with the [address] [place] [address] Cardiology in [place] with the following issues:    1  Atrial flutter/fibrillation  2  Heart failure with mildly reduced ejection fraction  3  Systolic cardiomyopathy, ejection fraction 45%  4  Peripheral arterial disease status post complex lower extremity revascularization and covered stent placement after stent fracture  5  History of coronary artery disease status post prior PCI  6  Chronic kidney disease stage 3  7  Dementia    Britni Bella [facility] for acute decompensated heart failure as well as atrial flutter with rapid ventricular response  Her atrial flutter is notoriously difficult to control  To control  There was some question about her volume status and she did undergo a right heart catheterization which demonstrated elevated filling pressures  She was aggressively diuresed and actually developed acute on chronic kidney injury from intravascular volume depletion  She was gently rehydrated and ultimately discharged to rehab  At rehab, she has done fairly well  Her daughter presents with her today and relates that her heart rate has been relatively well controlled up until Monday of this week  Her heart rate jumped up to 120 and she was prescribed digoxin  Unfortunately, digoxin does not show up anywhere on her medication list and there is no documentation in our chart that our cardiology office was ever contacted  The patient does admit to feeling short of breath when she ambulates  She did walk up are stairs to the office today and felt as though her heart was pounding    It took approximately 15 minutes for her to return to baseline        Patient Active Problem List   Diagnosis    HTN (hypertension)    COPD (chronic obstructive pulmonary disease) [facility]    Acute on chronic diastolic congestive heart failure (HCC)    Atrial flutter (HCC)    Back pain    Dementia (HCC)    Hyperlipidemia    Urinary tract infection associated with catheterization of urinary tract ([facility] )    RSV infection    Ambulatory dysfunction    Hypernatremia    Stage 3 chronic kidney disease ([facility] )    Lower extremity edema    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    JESENIA (obstructive sleep apnea)    Intermittent claudication of right lower extremity due to atherosclerosis (HCC)    Atrial fibrillation (HCC)    Chronic indwelling Davis catheter    Bradycardia    Essential hypertension    Peripheral artery disease ([facility])    Macrocytic anemia    Aortoiliac occlusive disease ([facility] )    HCAP (healthcare-associated pneumonia)    Coronary artery disease involving native coronary artery of native heart without angina pectoris    Hypothyroidism    PAD (peripheral artery disease) ([place] [address] )    History of cerebral aneurysm repair    Urinary retention    TAM (acute kidney injury) ([place] [address] )    Hypertensive kidney disease with stage 3 chronic kidney disease ([facility])    Azotemia     Past Medical History:   Diagnosis Date    Atrial fibrillation ([place] [address] )     Brain aneurysm     CAD (coronary artery disease)     Cardiac disease     had stents 12 years ago in [facility]    CKD (chronic kidney disease) stage 3, GFR 30-59 ml/min ([facility]) ckd    COPD (chronic obstructive pulmonary disease) ([facility])     Dementia ([facility])     Diastolic CHF, chronic (HCC)     GERD (gastroesophageal reflux disease)     Hyperlipidemia     Hyperlipidemia     Hypertension     Hypothyroidism     Migraine     PAD (peripheral artery disease) ([facility])     Renal disorder     Seizures ([facility]  )     Stroke [facility])      Social History     Socioeconomic History    Marital status:  Spouse name: Not on file    Number of children: 3    Years of education: Not on file    Highest education level: Not on file   Occupational History    Occupation: retired   Social Needs    Financial resource strain: Not on file    Food insecurity:     Worry: Not on file     Inability: Not on file   GoHome needs:     Medical: Not on file     Non-medical: Not on file   Tobacco Use    Smoking status: Former Smoker     Packs/day: 5 00     Years: 40 00     Pack years: 200 00     Last attempt to quit: 2007     Years since quittin 1    Smokeless tobacco: Never Used   Substance and Sexual Activity    Alcohol use: Never     Alcohol/week: 0 0 standard drinks     Frequency: Never    Drug use: No    Sexual activity: Not Currently   Lifestyle    Physical activity:     Days per week: Not on file     Minutes per session: Not on file    Stress: Not on file   Relationships    Social connections:     Talks on phone: Not on file     Gets together: Not on file     Attends Protestant service: Not on file     Active member of club or organization: Not on file     Attends meetings of clubs or organizations: Not on file     Relationship status: Not on file    Intimate partner violence:     Fear of current or ex partner: Not on file     Emotionally abused: Not on file     Physically abused: Not on file     Forced sexual activity: Not on file   Other Topics Concern    Not on file   Social History Narrative    Not on file      Family History   Problem Relation Age of Onset    Heart disease Father     Parkinsonism Father     Macular degeneration Mother     Glaucoma Mother     Diabetes Brother     Heart disease Brother      Past Surgical History:   Procedure Laterality Date    APPENDECTOMY      ARTERIOGRAM N/A 2018    Procedure: ARTERIOGRAM WITH STENT PLACEMENT;  Surgeon: Anne-Marie Reddy MD;  Location:  MAIN OR;  Service: Vascular    BLADDER TUMOR EXCISION     [address]  clip right frontal lobe    CARDIAC SURGERY      COLONOSCOPY      CORONARY STENT PLACEMENT      5 stents    EYE SURGERY      IR ABDOMINAL ANGIOGRAPHY / INTERVENTION  12/7/2018    MANDIBLE FRACTURE SURGERY      TONSILLECTOMY      TUBAL LIGATION      UTERINE FIBROID SURGERY         Current Outpatient Medications:     Acetaminophen 325 MG CAPS, Take by mouth, Disp: , Rfl:     albuterol (PROVENTIL HFA,VENTOLIN HFA) 90 mcg/act inhaler, Inhale 2 puffs every 6 (six) hours as needed for wheezing, Disp: 1 Inhaler, Rfl: 0    atorvastatin (LIPITOR) 10 mg tablet, Take 1 tablet (10 mg total) by mouth daily, Disp: 30 tablet, Rfl: 0    buPROPion (WELLBUTRIN SR) 150 mg 12 hr tablet, Take 1 tablet (150 mg total) by mouth daily, Disp: 30 tablet, Rfl: 0    carBAMazepine (TEGretol) 100 mg chewable tablet, Chew 1 tablet (100 mg total) 3 (three) times a day, Disp: 90 tablet, Rfl: 0    Citric Uv-Icafnzxuakc-Lf Carb (RENACIDIN) SOLN, Indications: 60ml to irrigate baca once a week on Friday, Disp: , Rfl:     clopidogrel (PLAVIX) 75 mg tablet, Take 1 tablet (75 mg total) by mouth daily, Disp: 90 tablet, Rfl: 0    fenofibrate micronized (LOFIBRA) 67 MG capsule, Take 1 capsule (67 mg total) by mouth daily with breakfast, Disp: 15 capsule, Rfl: 0    ferrous sulfate 325 (65 Fe) mg tablet, Take 1 tablet (325 mg total) by mouth 2 (two) times a day, Disp: 30 tablet, Rfl: 0    fluticasone-vilanterol (BREO ELLIPTA) 100-25 mcg/inh inhaler, Inhale 1 puff daily, Disp: 1 Inhaler, Rfl: 0    furosemide (LASIX) 40 mg tablet, Take 1 tablet (40 mg total) by mouth daily, Disp: 30 tablet, Rfl: 11    lactobacillus acidophilus, Take 1 capsule by mouth daily, Disp: , Rfl:     levothyroxine 88 mcg tablet, Take 1 tablet (88 mcg total) by mouth daily, Disp: 14 tablet, Rfl: 0    LORazepam (ATIVAN) 0 5 mg tablet, Take 1 tablet (0 5 mg total) by mouth 2 (two) times a day for 5 doses, Disp: 20 tablet, Rfl: 0    metoprolol tartrate (LOPRESSOR) 50 mg tablet, Take 1 tablet (50 mg total) by mouth every 12 (twelve) hours, Disp: 60 tablet, Rfl: 0    nitroglycerin (NITROSTAT) 0 4 mg SL tablet, Place 1 tablet (0 4 mg total) under the tongue every 5 (five) minutes as needed for chest pain, Disp: 90 tablet, Rfl: 0    potassium chloride (K-DUR,KLOR-CON) 20 mEq tablet, Take 1 tablet (20 mEq total) by mouth daily, Disp: 15 tablet, Rfl: 0    Sennosides-Docusate Sodium (SENNA PLUS PO), Take by mouth daily as needed, Disp: , Rfl:     betamethasone dipropionate (DIPROSONE) 0 05 % cream, Apply topically 3 (three) times a day, Disp: , Rfl:     Cholecalciferol (VITAMIN D) 2000 units CAPS, Take 1 capsule (2,000 Units total) by mouth daily (Patient not taking: Reported on 10/25/2019), Disp: 14 capsule, Rfl: 0    clotrimazole (LOTRIMIN) 1 % cream, Apply topically 3 (three) times a day, Disp: , Rfl:     divalproex sodium (DEPAKOTE ER) 250 mg 24 hr tablet, Take 1 tablet (250 mg total) by mouth 2 (two) times a day (Patient not taking: Reported on 10/25/2019), Disp: , Rfl:     saccharomyces boulardii (FLORASTOR) 250 mg capsule, Take 1 capsule (250 mg total) by mouth 2 (two) times a day (Patient not taking: Reported on 10/25/2019), Disp: , Rfl: 0    warfarin (COUMADIN) 3 mg tablet, One tablet daily or as otherwise directed   (Patient not taking: Reported on 10/25/2019), Disp: 30 tablet, Rfl: 0       Allergies   Allergen Reactions    Spiriva Handihaler [Tiotropium Bromide Monohydrate] Swelling    Ramipril Facial Swelling    Penicillins      unsure     Vitals:    10/25/19 0957   BP: 118/80   Pulse: (!) 120   SpO2: 97%   Weight: 83 kg (183 lb)   Height: 5' 2" (1 575 m)       Labs:  Results for Anna Cervantes (MRN [id number]) as of 10/25/2019 10:24   10/15/2019 05:03   Sodium 145   Potassium 3 6   Chloride 106   CO2 32   Anion Gap 7   BUN 60 (H)   Creatinine 1 66 (H)   Glucose, Random 115   Calcium 10 2 (H)   eGFR 30     Results for Anna Cervantes (MRN [id number]) as of 10/25/2019 10:24   10/15/2019 05:03   WBC 5 28   Red Blood Cell Count 3 90   Hemoglobin 12 9   HCT 40 1    (H)   MCH 33 1   MCHC 32 2   RDW 12 5   Platelet Count 386 (L)     Results for Donato Murillo (MRN [id number]) as of 10/25/2019 10:24   10/15/2019 05:03   Protime 26 3 (H)   INR 2 39 (H)         Imaging:  Echocardiogram, 10/04/2019  IMPRESSIONS:  The patient has an arrhythmia with rapid ventricular response; this may result in an apparent reduction in LV function due to inadequate preload, or reversible tachycardia-mediated reduction in LV function  Consider repeat examination once the arrhythmia is controlled  Right heart catheterization, 10/08/2019  SUMMARY  HEMODYNAMICS:  Hemodynamic assessment demonstrated moderately elevated pulmonary capillary wedge pressure  There was mild pulmonary hypertension  Review of Systems:  Review of Systems   Unable to perform ROS: Dementia       /80   Pulse (!) 120   Ht 5' 2" (1 575 m)   Wt 83 kg (183 lb)   SpO2 97%   BMI 33 47 kg/m²     Physical Exam:  Physical Exam   Constitutional: She is oriented to person, place, and time  She appears well-developed and well-nourished  HENT:   Head: Normocephalic and atraumatic  Eyes: Conjunctivae and EOM are normal    Neck: JVD (To the angle of the mandible while in an upright position) present  Carotid bruit is not present  No tracheal deviation present  No thyromegaly present  Cardiovascular: Normal rate, regular rhythm, S1 normal and S2 normal   No extrasystoles are present  PMI is not displaced  Exam reveals no gallop  No murmur heard  Pulses:       Carotid pulses are 2+ on the right side, and 2+ on the left side  Radial pulses are 2+ on the right side, and 2+ on the left side  Pulmonary/Chest: Breath sounds normal  No accessory muscle usage  No respiratory distress  Abdominal: Soft  Bowel sounds are normal  She exhibits no distension  There is no tenderness     Musculoskeletal: She exhibits edema (1+ pitting, bilateral lower extremities)  Lymphadenopathy:        Head (right side): No submental, no submandibular, no tonsillar, no preauricular, no posterior auricular and no occipital adenopathy present  Head (left side): No submental, no submandibular, no tonsillar, no preauricular, no posterior auricular and no occipital adenopathy present  Right cervical: No superficial cervical adenopathy present  Left cervical: No superficial cervical adenopathy present  Neurological: She is alert and oriented to person, place, and time  Skin: Skin is warm and dry  Discussion/Summary:  Atrial flutter/fibrillation, rapid ventricular response  Heart failure with mildly reduced ejection fraction  Systolic cardiomyopathy, ejection fraction 45%  Peripheral arterial disease status post complex lower extremity revascularization and covered stent placement after stent fracture  History of coronary artery disease status post prior PCI  Chronic kidney disease stage 3  Dementia    She appears to be in a flutter with RVR  We will confirm with an EKG  This is similar to some of the experience to having in the hospital   Think at this point, given her adequate anticoagulation over the past 3 weeks, we should schedule her for DC cardioversion on Monday, 10/28/2018  Additionally, we will increase her metoprolol to 100 mg b i d  Physical exam also is suggestive of decompensated heart failure with elevated JVD and pitting lower extremity edema  Increase furosemide to 80 mg daily  Repeat BMP in 1 week to monitor kidney function and electrolytes  She may ultimately require amiodarone again for control of her rate  Another possibility would include atrial flutter ablation courtesy of electrophysiology  I would like her to return for an EKG in 1 week  If this continues to be a problem, we will make a referral to EP      Continue on warfarin as well as clopidogrel given her complex stenting procedure  I would like her to return to our office within the next 1-2 months for re-evaluation  If she continues to be in decompensated atrial flutter or heart failure, that will prompt further action  Thank you for the opportunity to consult on this patient  If you have any questions, please feel free to call my office

## 2019-10-25 NOTE — TELEPHONE ENCOUNTER
S/w daughter in regards to getting pt set up for Cardioversion on Monday 10/28/19 as per Dr Fernandez Mass   Paperwork faxed to the hospital

## 2019-10-25 NOTE — PATIENT INSTRUCTIONS
Please increase furosemide (Lasix) to 80 mg daily  Please increased metoprolol tartrate (Lopressor) 200 mg twice a day  Continue warfarin on its current dosing, goal INR of 2-3  We will plan for a cardioversion on Monday, 10/28/2018 at [facility]     Please have a basic metabolic panel blood test checked in 1 week to monitor potassium and kidney function  Please return to our office in 1 week for an EKG  Please return to our office in 1 month for routine follow-up

## 2019-10-28 ENCOUNTER — ANESTHESIA EVENT (OUTPATIENT)
Dept: INTERVENTIONAL RADIOLOGY/VASCULAR | Facility: HOSPITAL | Age: 74
End: 2019-10-28

## 2019-10-28 ENCOUNTER — HOSPITAL ENCOUNTER (OUTPATIENT)
Dept: INTERVENTIONAL RADIOLOGY/VASCULAR | Facility: HOSPITAL | Age: 74
Discharge: HOME | End: 2019-10-28
Attending: INTERNAL MEDICINE
Payer: COMMERCIAL

## 2019-10-28 ENCOUNTER — TELEPHONE (OUTPATIENT)
Dept: CARDIOLOGY CLINIC | Facility: CLINIC | Age: 74
End: 2019-10-28

## 2019-10-28 ENCOUNTER — ANESTHESIA (OUTPATIENT)
Dept: INTERVENTIONAL RADIOLOGY/VASCULAR | Facility: HOSPITAL | Age: 74
End: 2019-10-28

## 2019-10-28 VITALS
BODY MASS INDEX: 34.48 KG/M2 | HEIGHT: 62 IN | TEMPERATURE: 97.6 F | OXYGEN SATURATION: 92 % | HEART RATE: 109 BPM | RESPIRATION RATE: 16 BRPM | WEIGHT: 187.39 LBS | DIASTOLIC BLOOD PRESSURE: 96 MMHG | SYSTOLIC BLOOD PRESSURE: 155 MMHG

## 2019-10-28 DIAGNOSIS — I50.33 ACUTE ON CHRONIC DIASTOLIC (CONGESTIVE) HEART FAILURE (HCC): ICD-10-CM

## 2019-10-28 DIAGNOSIS — I48.20 CHRONIC A-FIB (HCC): ICD-10-CM

## 2019-10-28 DIAGNOSIS — I48.92 ATRIAL FLUTTER, UNSPECIFIED TYPE (HCC): ICD-10-CM

## 2019-10-28 DIAGNOSIS — I50.33 ACUTE ON CHRONIC DIASTOLIC CONGESTIVE HEART FAILURE (HCC): Primary | ICD-10-CM

## 2019-10-28 LAB
ANION GAP SERPL CALCULATED.3IONS-SCNC: 10 MMOL/L (ref 4–13)
BASOPHILS # BLD AUTO: 0.02 THOUSANDS/ΜL (ref 0–0.1)
BASOPHILS NFR BLD AUTO: 0 % (ref 0–1)
BUN SERPL-MCNC: 30 MG/DL (ref 5–25)
CALCIUM SERPL-MCNC: 9.9 MG/DL (ref 8.3–10.1)
CHLORIDE SERPL-SCNC: 108 MMOL/L (ref 100–108)
CO2 SERPL-SCNC: 32 MMOL/L (ref 21–32)
CREAT SERPL-MCNC: 1.78 MG/DL (ref 0.6–1.3)
EOSINOPHIL # BLD AUTO: 0.24 THOUSAND/ΜL (ref 0–0.61)
EOSINOPHIL NFR BLD AUTO: 4 % (ref 0–6)
ERYTHROCYTE [DISTWIDTH] IN BLOOD BY AUTOMATED COUNT: 13 % (ref 11.6–15.1)
GFR SERPL CREATININE-BSD FRML MDRD: 28 ML/MIN/1.73SQ M
GLUCOSE P FAST SERPL-MCNC: 95 MG/DL (ref 65–99)
GLUCOSE SERPL-MCNC: 95 MG/DL (ref 65–140)
HCT VFR BLD AUTO: 41.2 % (ref 34.8–46.1)
HGB BLD-MCNC: 13.4 G/DL (ref 11.5–15.4)
IMM GRANULOCYTES # BLD AUTO: 0.02 THOUSAND/UL (ref 0–0.2)
IMM GRANULOCYTES NFR BLD AUTO: 0 % (ref 0–2)
INR PPP: 1.15 (ref 0.84–1.19)
LYMPHOCYTES # BLD AUTO: 1.85 THOUSANDS/ΜL (ref 0.6–4.47)
LYMPHOCYTES NFR BLD AUTO: 33 % (ref 14–44)
MAGNESIUM SERPL-MCNC: 2.2 MG/DL (ref 1.6–2.6)
MCH RBC QN AUTO: 33.7 PG (ref 26.8–34.3)
MCHC RBC AUTO-ENTMCNC: 32.5 G/DL (ref 31.4–37.4)
MCV RBC AUTO: 104 FL (ref 82–98)
MONOCYTES # BLD AUTO: 0.54 THOUSAND/ΜL (ref 0.17–1.22)
MONOCYTES NFR BLD AUTO: 10 % (ref 4–12)
NEUTROPHILS # BLD AUTO: 2.89 THOUSANDS/ΜL (ref 1.85–7.62)
NEUTS SEG NFR BLD AUTO: 53 % (ref 43–75)
NRBC BLD AUTO-RTO: 0 /100 WBCS
PLATELET # BLD AUTO: 150 THOUSANDS/UL (ref 149–390)
PMV BLD AUTO: 12.4 FL (ref 8.9–12.7)
POTASSIUM SERPL-SCNC: 3.9 MMOL/L (ref 3.5–5.3)
PROTHROMBIN TIME: 14.7 SECONDS (ref 11.6–14.5)
RBC # BLD AUTO: 3.98 MILLION/UL (ref 3.81–5.12)
SODIUM SERPL-SCNC: 150 MMOL/L (ref 136–145)
WBC # BLD AUTO: 5.56 THOUSAND/UL (ref 4.31–10.16)

## 2019-10-28 PROCEDURE — 85610 PROTHROMBIN TIME: CPT | Performed by: INTERNAL MEDICINE

## 2019-10-28 PROCEDURE — 85025 COMPLETE CBC W/AUTO DIFF WBC: CPT | Performed by: INTERNAL MEDICINE

## 2019-10-28 PROCEDURE — 93005 ELECTROCARDIOGRAM TRACING: CPT

## 2019-10-28 PROCEDURE — 83735 ASSAY OF MAGNESIUM: CPT | Performed by: INTERNAL MEDICINE

## 2019-10-28 PROCEDURE — 80048 BASIC METABOLIC PNL TOTAL CA: CPT | Performed by: INTERNAL MEDICINE

## 2019-10-28 RX ORDER — FUROSEMIDE 40 MG/1
40 TABLET ORAL DAILY
Qty: 90 TABLET | Refills: 4 | Status: SHIPPED | OUTPATIENT
Start: 2019-10-28 | End: 2020-04-16 | Stop reason: SDUPTHER

## 2019-10-28 RX ORDER — DIGOXIN 125 MCG
125 TABLET ORAL DAILY
COMMUNITY

## 2019-10-28 RX ORDER — SODIUM CHLORIDE 9 MG/ML
75 INJECTION, SOLUTION INTRAVENOUS CONTINUOUS
Status: CANCELLED | OUTPATIENT
Start: 2019-10-28

## 2019-10-28 RX ORDER — SODIUM CHLORIDE 9 MG/ML
75 INJECTION, SOLUTION INTRAVENOUS CONTINUOUS
Status: DISCONTINUED | OUTPATIENT
Start: 2019-10-28 | End: 2019-11-01 | Stop reason: HOSPADM

## 2019-10-28 RX ORDER — FUROSEMIDE 20 MG/1
40 TABLET ORAL DAILY
Status: DISCONTINUED | OUTPATIENT
Start: 2019-10-28 | End: 2019-11-01 | Stop reason: HOSPADM

## 2019-10-28 RX ORDER — WARFARIN SODIUM 3 MG/1
3 TABLET ORAL
Status: DISCONTINUED | OUTPATIENT
Start: 2019-10-28 | End: 2019-11-01 | Stop reason: HOSPADM

## 2019-10-28 RX ADMIN — WARFARIN SODIUM 3 MG: 3 TABLET ORAL at 15:11

## 2019-10-28 RX ADMIN — SODIUM CHLORIDE 75 ML/HR: 0.9 INJECTION, SOLUTION INTRAVENOUS at 12:45

## 2019-10-28 NOTE — ANESTHESIA PREPROCEDURE EVALUATION
Review of Systems/Medical History  Patient summary reviewed        Cardiovascular  EKG reviewed, Negative cardio ROS Hyperlipidemia, Hypertension , Past MI > 6 months, CAD , CAD status: 3VD, Cardiac stents  Dysrhythmias , atrial fibrillation, CHF ,   Comment:    LEFT VENTRICLE:  Systolic function was mildly reduced  Ejection fraction was estimated to be 45 %  There was mild diffuse hypokinesis      AORTIC VALVE:  There was mild regurgitation      IVC, HEPATIC VEINS:  The inferior vena cava was dilated    ,  Pulmonary  Pneumonia, COPD moderate- medication dependent , Shortness of breath, Sleep apnea ,        GI/Hepatic  Negative GI/hepatic ROS   GERD ,        Chronic kidney disease stage 3,        Endo/Other  Negative endo/other ROS History of thyroid disease , hypothyroidism,      GYN  Negative gynecology ROS          Hematology  Anemia ,     Musculoskeletal  Negative musculoskeletal ROS        Neurology  Negative neurology ROS Seizures ,  CVA , Headaches,   Comment: Brain aneurysm Psychology   Negative psychology ROS              Physical Exam    Airway    Mallampati score: III  TM Distance: >3 FB  Neck ROM: full     Dental       Cardiovascular  Comment: Negative ROS, Cardiovascular exam normal    Pulmonary  Pulmonary exam normal     Other Findings        Anesthesia Plan  ASA Score- 3     Anesthesia Type- IV sedation with anesthesia with ASA Monitors  Additional Monitors:   Airway Plan:         Plan Factors-    Induction- intravenous  Postoperative Plan-     Informed Consent- Anesthetic plan and risks discussed with patient  I personally reviewed this patient with the CRNA  Discussed and agreed on the Anesthesia Plan with the CRNA  Jelani Jerez

## 2019-10-28 NOTE — NURSING NOTE
Cardioversion cancelled due to subtherapeutic INR  Patient received Coumadin 3 mg PO today at 1511  Patient discharged to [facility] in care of daughter  Copy of AVS reflecting Coumadin and changes to Lasix as ordered by cardiology provided to both daughter and receiving facility

## 2019-10-28 NOTE — TELEPHONE ENCOUNTER
Pt came in the hospital to have her cardioversion but needed to be rescheduled because her INR was  subtherapautic  S/w Anayte Escort and he wants her inr to be therapeutic for 3 consecutive weeks before rescheduling  I called over  to [facility] ( [phone]) and s/w the nurse Jay Jay Parra  She said Coumadin was not on her med list that it why she was not getting it  I said it was on her hospital dc instructions  Esteban Dubin to have her take 3mg daily and retest again on Monday and to call/fax results to us  She agreed

## 2019-10-29 ENCOUNTER — TELEPHONE (OUTPATIENT)
Dept: CARDIOLOGY CLINIC | Facility: CLINIC | Age: 74
End: 2019-10-29

## 2019-10-29 DIAGNOSIS — I48.0 PAROXYSMAL ATRIAL FIBRILLATION (HCC): Primary | ICD-10-CM

## 2019-10-29 LAB
ATRIAL RATE: 249 BPM
P AXIS: 79 DEGREES
QRS AXIS: -9 DEGREES
QRSD INTERVAL: 98 MS
QT INTERVAL: 400 MS
QTC INTERVAL: 494 MS
T WAVE AXIS: 53 DEGREES
VENTRICULAR RATE: 92 BPM

## 2019-10-29 PROCEDURE — 93010 ELECTROCARDIOGRAM REPORT: CPT | Performed by: INTERNAL MEDICINE

## 2019-10-29 NOTE — TELEPHONE ENCOUNTER
Pt daughter said she was told by [facility] that pt is supposed to see you in a week  Pt has a sched appt with you on 12/06/19  Please call pt daughter to discuss  She said there was miscommunication with pt meds   Thank you

## 2019-10-30 NOTE — TELEPHONE ENCOUNTER
Discussed the situation with Dayday Cruz  Patient has been enrolled in coumadin clinic      [address]

## 2019-11-01 ENCOUNTER — HOSPITAL ENCOUNTER (OUTPATIENT)
Dept: NON INVASIVE DIAGNOSTICS | Facility: HOSPITAL | Age: 74
Discharge: HOME | End: 2019-11-01
Attending: SURGERY
Payer: COMMERCIAL

## 2019-11-01 DIAGNOSIS — T82.856S STENOSIS OF PERIPHERAL VASCULAR STENT, SEQUELA: ICD-10-CM

## 2019-11-01 PROCEDURE — 93978 VASCULAR STUDY: CPT | Performed by: SURGERY

## 2019-11-01 PROCEDURE — 93978 VASCULAR STUDY: CPT

## 2019-11-04 ENCOUNTER — ANTICOAG VISIT (OUTPATIENT)
Dept: CARDIOLOGY CLINIC | Facility: CLINIC | Age: 74
End: 2019-11-04

## 2019-11-04 DIAGNOSIS — I48.0 PAROXYSMAL ATRIAL FIBRILLATION (HCC): ICD-10-CM

## 2019-11-05 NOTE — PROGRESS NOTES
Pt is back at [facility] ([phone]) I called there, s/w nursing and explained the inr medication issue which they already knew of and explained that her INR needs to be therapeutic for 3 weeks in a row in order for us to do her cardio version  They will manage as they where prior and send us the results each week

## 2019-11-15 ENCOUNTER — ANTICOAG VISIT (OUTPATIENT)
Dept: CARDIOLOGY CLINIC | Facility: CLINIC | Age: 74
End: 2019-11-15

## 2019-11-15 DIAGNOSIS — I48.0 PAROXYSMAL ATRIAL FIBRILLATION (HCC): ICD-10-CM

## 2019-11-15 LAB — INR PPP: 1.8 (ref 0.84–1.19)

## 2019-11-15 NOTE — PROGRESS NOTES
We don't manage her INR  [facility] and [facility] results  from 11/14  She is at 1 8  I called there,  s/w nurse Eveline Cope and asked what mg is she on and she said  2mg  I explained that her dc records said 3mg and I also called on 10/28 stating she should be on 3mg  Eveline Cope said she will change it to 3mg and I requested another INR to be drawn in 1 week which she said she will do

## 2019-12-03 ENCOUNTER — TELEPHONE (OUTPATIENT)
Dept: CARDIOLOGY CLINIC | Facility: CLINIC | Age: 74
End: 2019-12-03

## 2019-12-04 ENCOUNTER — ANTICOAG VISIT (OUTPATIENT)
Dept: CARDIOLOGY CLINIC | Facility: CLINIC | Age: 74
End: 2019-12-04

## 2019-12-04 DIAGNOSIS — I48.0 PAROXYSMAL ATRIAL FIBRILLATION (HCC): ICD-10-CM

## 2019-12-04 LAB — INR PPP: 2.1 (ref 0.84–1.19)

## 2019-12-04 NOTE — PROGRESS NOTES
[facility] and l[facility] instructing them to keep pt on 3mg daily and retest again in 1 week

## 2019-12-06 ENCOUNTER — OFFICE VISIT (OUTPATIENT)
Dept: CARDIOLOGY CLINIC | Facility: CLINIC | Age: 74
End: 2019-12-06
Payer: MEDICARE

## 2019-12-06 VITALS
WEIGHT: 181 LBS | HEART RATE: 60 BPM | SYSTOLIC BLOOD PRESSURE: 148 MMHG | OXYGEN SATURATION: 98 % | DIASTOLIC BLOOD PRESSURE: 90 MMHG | HEIGHT: 62 IN | BODY MASS INDEX: 33.31 KG/M2

## 2019-12-06 DIAGNOSIS — I10 ESSENTIAL HYPERTENSION: ICD-10-CM

## 2019-12-06 DIAGNOSIS — I74.09 AORTOILIAC OCCLUSIVE DISEASE (HCC): ICD-10-CM

## 2019-12-06 DIAGNOSIS — I48.0 PAROXYSMAL ATRIAL FIBRILLATION (HCC): ICD-10-CM

## 2019-12-06 DIAGNOSIS — I25.10 CORONARY ARTERY DISEASE INVOLVING NATIVE CORONARY ARTERY OF NATIVE HEART WITHOUT ANGINA PECTORIS: ICD-10-CM

## 2019-12-06 DIAGNOSIS — I73.9 PAD (PERIPHERAL ARTERY DISEASE) (HCC): ICD-10-CM

## 2019-12-06 DIAGNOSIS — F02.81 DEMENTIA ASSOCIATED WITH OTHER UNDERLYING DISEASE WITH BEHAVIORAL DISTURBANCE (HCC): ICD-10-CM

## 2019-12-06 DIAGNOSIS — J41.0 SIMPLE CHRONIC BRONCHITIS (HCC): Primary | Chronic | ICD-10-CM

## 2019-12-06 PROCEDURE — 99213 OFFICE O/P EST LOW 20 MIN: CPT | Performed by: INTERNAL MEDICINE

## 2019-12-06 NOTE — PATIENT INSTRUCTIONS
No medication changes were made today  No medical contraindication to airplane travel  She should return to our office in 6 months, sooner if any acute issues arise

## 2019-12-06 NOTE — PROGRESS NOTES
Cardiology Follow Up    Faraz Macdonald  [birth year]  [id number]  [address] [place] [address]      Dear Dr Keyanna Whipple,  Faraz Macdonald is a 67-year-old female who follows with the [address] [place] [address] Cardiology in [place] with the following issues:     1  Atrial flutter/fibrillation  2  Heart failure with mildly reduced ejection fraction  3  Systolic cardiomyopathy, ejection fraction 45%  4  Peripheral arterial disease status post complex lower extremity revascularization and covered stent placement after stent fracture  5  History of coronary artery disease status post prior PCI  6  Chronic kidney disease stage 3  7  Dementia    Interval History:  Her daughter is present and provides the majority of history as per usual   There was some discussion of possible GI upset and she is going to be prescribed an antacid at the personal care home  No further issues are documented  She is planning to travel to [place] in February of 2020         Patient Active Problem List   Diagnosis    HTN (hypertension)    COPD [facility]chronic obstructive pulmonary disease) [facility]    Acute on chronic diastolic congestive heart failure (HCC)    Atrial flutter (HCC)    Back pain    Dementia (HCC)    Hyperlipidemia    Urinary tract infection associated with catheterization of urinary tract ([facility] )    RSV infection    Ambulatory dysfunction    Hypernatremia    Stage 3 chronic kidney disease ([facility]    Lower extremity edema    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    JESENIA (obstructive sleep apnea)    Intermittent claudication of right lower extremity due to atherosclerosis (HCC)    Atrial fibrillation (HCC)    Chronic indwelling Davis catheter    Bradycardia    Essential hypertension    Peripheral artery disease (HCC)    Macrocytic anemia    Aortoiliac occlusive disease ([facility]    HCAP (healthcare-associated pneumonia)    Coronary artery disease involving native coronary artery of native heart without angina pectoris    Hypothyroidism    PAD (peripheral artery disease) ([place] [address] )    History of cerebral aneurysm repair    Urinary retention    TAM (acute kidney injury) ([place] [address] )    Hypertensive kidney disease with stage 3 chronic kidney disease ([facility])    Azotemia    Paroxysmal atrial fibrillation ([facility])     Past Medical History:   Diagnosis Date    Atrial fibrillation ([place] [address] )     Brain aneurysm     CAD (coronary artery disease)     Cardiac disease     had stents 12 years ago in [facility]    CKD (chronic kidney disease) stage 3, GFR 30-59 ml/min ([facility]) ckd    COPD (chronic obstructive pulmonary disease) ([facility])     Dementia ([facility])     Diastolic CHF, chronic ([facility])     GERD (gastroesophageal reflux disease)     Hyperlipidemia     Hyperlipidemia     Hypertension     Hypothyroidism     Migraine     PAD (peripheral artery disease) ([facility])     Renal disorder     Seizures ([place] [address] )     Stroke ([place] [address] )      Social History     Socioeconomic History    Marital status:      Spouse name: Not on file    Number of children: 3    Years of education: Not on file    Highest education level: Not on file   Occupational History    Occupation: retired   Social Needs    Financial resource strain: Not on file    Food insecurity:     Worry: Not on file     Inability: Not on file   Handy needs:     Medical: Not on file     Non-medical: Not on file   Tobacco Use    Smoking status: Former Smoker     Packs/day: 5 00     Years: 40 00     Pack years: 200 00     Last attempt to quit: 2007     Years since quittin 2    Smokeless tobacco: Never Used   Substance and Sexual Activity    Alcohol use: Never     Alcohol/week: 0 0 standard drinks     Frequency: Never    Drug use: No    Sexual activity: Not Currently   Lifestyle    Physical activity:     Days per week: Not on file     Minutes per session: Not on file    Stress: Not on file Relationships    Social connections:     Talks on phone: Not on file     Gets together: Not on file     Attends Rastafari service: Not on file     Active member of club or organization: Not on file     Attends meetings of clubs or organizations: Not on file     Relationship status: Not on file    Intimate partner violence:     Fear of current or ex partner: Not on file     Emotionally abused: Not on file     Physically abused: Not on file     Forced sexual activity: Not on file   Other Topics Concern    Not on file   Social History Narrative    Not on file      Family History   Problem Relation Age of Onset    Heart disease Father     Parkinsonism Father     Macular degeneration Mother     Glaucoma Mother     Diabetes Brother     Heart disease Brother      Past Surgical History:   Procedure Laterality Date    APPENDECTOMY      ARTERIOGRAM N/A 12/7/2018    Procedure: ARTERIOGRAM WITH STENT PLACEMENT;  Surgeon: Maite Reddy MD;  [facility]: [facility];  Service: Vascular    BLADDER TUMOR EXCISION      BRAIN SURGERY      1998  clip right frontal lobe    CARDIAC SURGERY      COLONOSCOPY      CORONARY STENT PLACEMENT      5 stents    EYE SURGERY      IR ABDOMINAL ANGIOGRAPHY / INTERVENTION  12/7/2018    MANDIBLE FRACTURE SURGERY      TONSILLECTOMY      TUBAL LIGATION      UTERINE FIBROID SURGERY         Current Outpatient Medications:     Acetaminophen 325 MG CAPS, Take by mouth, Disp: , Rfl:     albuterol (PROVENTIL HFA,VENTOLIN HFA) 90 mcg/act inhaler, Inhale 2 puffs every 6 (six) hours as needed for wheezing, Disp: 1 Inhaler, Rfl: 0    atorvastatin (LIPITOR) 10 mg tablet, Take 1 tablet (10 mg total) by mouth daily, Disp: 30 tablet, Rfl: 0    betamethasone dipropionate (DIPROSONE) 0 05 % cream, Apply topically 3 (three) times a day, Disp: , Rfl:     buPROPion (WELLBUTRIN SR) 150 mg 12 hr tablet, Take 1 tablet (150 mg total) by mouth daily, Disp: 30 tablet, Rfl: 0    carBAMazepine (TEGretol) 100 mg chewable tablet, Chew 1 tablet (100 mg total) 3 (three) times a day, Disp: 90 tablet, Rfl: 0    Cholecalciferol (VITAMIN D) 2000 units CAPS, Take 1 capsule (2,000 Units total) by mouth daily, Disp: 14 capsule, Rfl: 0    Citric Qj-Rsjqnjbusog-Ze Carb (RENACIDIN) SOLN, Indications: 60ml to irrigate baca once a week on Friday, Disp: , Rfl:     clopidogrel (PLAVIX) 75 mg tablet, Take 1 tablet (75 mg total) by mouth daily, Disp: 90 tablet, Rfl: 0    clotrimazole (LOTRIMIN) 1 % cream, Apply topically 3 (three) times a day, Disp: , Rfl:     digoxin (LANOXIN) 0 125 mg tablet, Take 125 mcg by mouth daily, Disp: , Rfl:     divalproex sodium (DEPAKOTE ER) 250 mg 24 hr tablet, Take 1 tablet (250 mg total) by mouth 2 (two) times a day, Disp: , Rfl:     fenofibrate micronized (LOFIBRA) 67 MG capsule, Take 1 capsule (67 mg total) by mouth daily with breakfast, Disp: 15 capsule, Rfl: 0    ferrous sulfate 325 (65 Fe) mg tablet, Take 1 tablet (325 mg total) by mouth 2 (two) times a day, Disp: 30 tablet, Rfl: 0    fluticasone-vilanterol (BREO ELLIPTA) 100-25 mcg/inh inhaler, Inhale 1 puff daily, Disp: 1 Inhaler, Rfl: 0    furosemide (LASIX) 40 mg tablet, Take 1 tablet (40 mg total) by mouth daily, Disp: 90 tablet, Rfl: 4    lactobacillus acidophilus, Take 1 capsule by mouth daily, Disp: , Rfl:     levothyroxine 88 mcg tablet, Take 1 tablet (88 mcg total) by mouth daily, Disp: 14 tablet, Rfl: 0    metoprolol tartrate (LOPRESSOR) 100 mg tablet, Take 1 tablet (100 mg total) by mouth every 12 (twelve) hours, Disp: 180 tablet, Rfl: 3    nitroglycerin (NITROSTAT) 0 4 mg SL tablet, Place 1 tablet (0 4 mg total) under the tongue every 5 (five) minutes as needed for chest pain, Disp: 90 tablet, Rfl: 0    potassium chloride (K-DUR,KLOR-CON) 20 mEq tablet, Take 1 tablet (20 mEq total) by mouth daily, Disp: 15 tablet, Rfl: 0    saccharomyces boulardii (FLORASTOR) 250 mg capsule, Take 1 capsule (250 mg total) by mouth 2 (two) times a day, Disp: , Rfl: 0    Sennosides-Docusate Sodium (SENNA PLUS PO), Take by mouth daily as needed, Disp: , Rfl:     warfarin (COUMADIN) 3 mg tablet, One tablet daily or as otherwise directed , Disp: 30 tablet, Rfl: 0    LORazepam (ATIVAN) 0 5 mg tablet, Take 1 tablet (0 5 mg total) by mouth 2 (two) times a day for 5 doses, Disp: 20 tablet, Rfl: 0       Allergies   Allergen Reactions    Spiriva Handihaler [Tiotropium Bromide Monohydrate] Swelling    Ramipril Facial Swelling    Penicillins      unsure       Labs:  Results for Meagan Unger (MRN [id number]) as of 12/6/2019 10:47   10/28/2019 13:27   Sodium 150 (H)   Potassium 3 9   Chloride 108   CO2 32   Anion Gap 10   BUN 30 (H)   Creatinine 1 78 (H)   Glucose, Random 95   GLUCOSE FASTING 95   Calcium 9 9   eGFR 28   Magnesium 2 2     Results for Meagan Unger (MRN [id number]) as of 12/6/2019 10:47   10/28/2019 12:47   WBC 5 56   Red Blood Cell Count 3 98   Hemoglobin 13 4   HCT 41 2    (H)   MCH 33 7   MCHC 32 5   RDW 13 0   Platelet Count 726     Results for Meagan Unger (MRN [id number]) as of 12/6/2019 10:47   10/28/2019 12:47 11/15/2019 00:00 12/4/2019 00:00   INR 1 15 1 80 (A) 2 10 (A)       Imaging: No results found  Review of Systems:  Review of Systems   Unable to perform ROS: Dementia       Physical Exam:  Physical Exam   Constitutional: She is oriented to person, place, and time  She appears well-developed and well-nourished  HENT:   Head: Normocephalic and atraumatic  Eyes: Conjunctivae and EOM are normal    Neck: No JVD present  Carotid bruit is not present  No tracheal deviation present  No thyromegaly present  Cardiovascular: Normal rate, regular rhythm, S1 normal and S2 normal   No extrasystoles are present  PMI is not displaced  Exam reveals no gallop  No murmur heard  Pulses:       Carotid pulses are 2+ on the right side, and 2+ on the left side         Radial pulses are 2+ on the right side, and 2+ on the left side  Pulmonary/Chest: Breath sounds normal  No accessory muscle usage  No respiratory distress  Abdominal: Soft  Bowel sounds are normal  She exhibits no distension  There is no tenderness  Musculoskeletal: She exhibits no edema  Lymphadenopathy:        Head (right side): No submental, no submandibular, no tonsillar, no preauricular, no posterior auricular and no occipital adenopathy present  Head (left side): No submental, no submandibular, no tonsillar, no preauricular, no posterior auricular and no occipital adenopathy present  Right cervical: No superficial cervical adenopathy present  Left cervical: No superficial cervical adenopathy present  Neurological: She is alert and oriented to person, place, and time  Skin: Skin is warm and dry  Discussion/Summary:  Atrial flutter/fibrillation, controlled  Heart failure with mildly reduced ejection fraction  Systolic cardiomyopathy, ejection fraction 45%  Peripheral arterial disease status post complex lower extremity revascularization and covered stent placement after stent fracture  History of coronary artery disease status post prior PCI  Chronic kidney disease stage 3  Dementia      From a medical standpoint, Gino Nieves is doing quite well  I am pleased with her recovery from her most recent hospitalization  Her INR is therapeutic and she is receiving all of the medications that she should be receiving  Her medications from her [address] [place] [address] home were reviewed and appeared to be accurate  We discussed our overall care strategy and are still in agreement that a conservative approach is best   We are going to do our best to minimize any in all invasive procedures and changes in medications  There is no medical contraindication to her going to [place] via a airplane  I would like her to return to our office in 6 months, sooner if any acute issues arise      Thank you for the opportunity to consult on this patient  If you have any questions, please feel free to call my office

## 2019-12-06 NOTE — LETTER
December 6, 2019     Kenya Delgado DO  [address] [facility] [address]    Patient: Stanislaw Dickson   YOB: 1945   Date of Visit: 12/6/2019       Dear Dr Margaret Miller: Thank you for referring Stanislaw Dickson to me for evaluation  Below are my notes for this consultation  If you have questions, please do not hesitate to call me  I look forward to following your patient along with you  Sincerely,        Hue Arnold DO        CC: No Recipients  Hue Arnold DO  12/6/2019 10:55 AM  Sign at close encounter                                             Cardiology Follow Up    Stanislaw Dickson  [birth year]  [id number]  [address] [place] [address]      Dear Dr Priyanka De Leon is a 61-year-old female who follows with the [address] [place] [address] Cardiology in [facility] with the following issues:     1  Atrial flutter/fibrillation  2  Heart failure with mildly reduced ejection fraction  3  Systolic cardiomyopathy, ejection fraction 45%  4  Peripheral arterial disease status post complex lower extremity revascularization and covered stent placement after stent fracture  5  History of coronary artery disease status post prior PCI  6  Chronic kidney disease stage 3  7  Dementia    Interval History:  Her daughter is present and provides the majority of history as per usual   There was some discussion of possible GI upset and she is going to be prescribed an antacid at the personal care home  No further issues are documented  She is planning to travel to [place] in February of 2020         Patient Active Problem List   Diagnosis    HTN (hypertension)    COPD [facility]chronic obstructive [facility] disease) [facility]    Acute on chronic diastolic congestive heart failure (HCC)    Atrial flutter (HCC)    Back pain    Dementia (HCC)    Hyperlipidemia    Urinary tract infection associated with catheterization of urinary tract ([facility] )    RSV infection    Ambulatory dysfunction    Hypernatremia    Stage 3 chronic kidney disease ([facility])    Lower extremity edema    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    JESENIA (obstructive sleep apnea)    Intermittent claudication of right lower extremity due to atherosclerosis ([facility])    Atrial fibrillation ([facility])    Chronic indwelling Davis catheter    Bradycardia    Essential hypertension    Peripheral artery disease ([facility])    Macrocytic anemia    Aortoiliac occlusive disease ([place] [address] )    HCAP (healthcare-associated pneumonia)    Coronary artery disease involving native coronary artery of native heart without angina pectoris    Hypothyroidism    PAD (peripheral artery disease) ([place] [address] )    History of cerebral aneurysm repair    Urinary retention    TAM (acute kidney injury) ([place] [address] )    Hypertensive kidney disease with stage 3 chronic kidney disease ([facility])    Azotemia    Paroxysmal atrial fibrillation ([facility])     Past Medical History:   Diagnosis Date    Atrial fibrillation ([place] [address] )     Brain aneurysm     CAD (coronary artery disease)     Cardiac disease     had stents 12 years ago in [facility]    CKD (chronic kidney disease) stage 3, GFR 30-59 ml/min ([facility]) ckd    COPD (chronic obstructive pulmonary disease) ([facility])     Dementia ([facility])     Diastolic CHF, chronic ([facility])     GERD (gastroesophageal reflux disease)     Hyperlipidemia     Hyperlipidemia     Hypertension     Hypothyroidism     Migraine     PAD (peripheral artery disease) ([facility])     Renal disorder     Seizures ([place] [address] )     Stroke ([place] [address] )      Social History     Socioeconomic History    Marital status:      Spouse name: Not on file    Number of children: 4    Years of education: Not on file    Highest education level: Not on file   Occupational History    Occupation: retired   Social Needs    Financial resource strain: Not on file    Food insecurity:     Worry: Not on file     Inability: Not on file   [employer] needs: Medical: Not on file     Non-medical: Not on file   Tobacco Use    Smoking status: Former Smoker     Packs/day: 5 00     Years: 40 00     Pack years: 200 00     Last attempt to quit: 2007     Years since quittin 2    Smokeless tobacco: Never Used   Substance and Sexual Activity    Alcohol use: Never     Alcohol/week: 0 0 standard drinks     Frequency: Never    Drug use: No    Sexual activity: Not Currently   Lifestyle    Physical activity:     Days per week: Not on file     Minutes per session: Not on file    Stress: Not on file   Relationships    Social connections:     Talks on phone: Not on file     Gets together: Not on file     Attends Muslim service: Not on file     Active member of club or organization: Not on file     Attends meetings of clubs or organizations: Not on file     Relationship status: Not on file    Intimate partner violence:     Fear of current or ex partner: Not on file     Emotionally abused: Not on file     Physically abused: Not on file     Forced sexual activity: Not on file   Other Topics Concern    Not on file   Social History Narrative    Not on file      Family History   Problem Relation Age of Onset    Heart disease Father     Parkinsonism Father     Macular degeneration Mother     Glaucoma Mother     Diabetes Brother     Heart disease Brother      Past Surgical History:   Procedure Laterality Date    APPENDECTOMY      ARTERIOGRAM N/A 2018    Procedure: ARTERIOGRAM WITH STENT PLACEMENT;  Surgeon: Cat Reddy MD;  [facility];  Service: Vascular    BLADDER TUMOR EXCISION      BRAIN SURGERY        clip right frontal lobe    CARDIAC SURGERY      COLONOSCOPY      CORONARY STENT PLACEMENT      5 stents    EYE SURGERY      IR ABDOMINAL ANGIOGRAPHY / INTERVENTION  2018    MANDIBLE FRACTURE SURGERY      TONSILLECTOMY      TUBAL LIGATION      UTERINE FIBROID SURGERY         Current Outpatient Medications:     Acetaminophen 325 MG CAPS, Take by mouth, Disp: , Rfl:     albuterol (PROVENTIL HFA,VENTOLIN HFA) 90 mcg/act inhaler, Inhale 2 puffs every 6 (six) hours as needed for wheezing, Disp: 1 Inhaler, Rfl: 0    atorvastatin (LIPITOR) 10 mg tablet, Take 1 tablet (10 mg total) by mouth daily, Disp: 30 tablet, Rfl: 0    betamethasone dipropionate (DIPROSONE) 0 05 % cream, Apply topically 3 (three) times a day, Disp: , Rfl:     buPROPion (WELLBUTRIN SR) 150 mg 12 hr tablet, Take 1 tablet (150 mg total) by mouth daily, Disp: 30 tablet, Rfl: 0    carBAMazepine (TEGretol) 100 mg chewable tablet, Chew 1 tablet (100 mg total) 3 (three) times a day, Disp: 90 tablet, Rfl: 0    Cholecalciferol (VITAMIN D) 2000 units CAPS, Take 1 capsule (2,000 Units total) by mouth daily, Disp: 14 capsule, Rfl: 0    Citric Sl-Spuhzvvznaj-Fu Carb (RENACIDIN) SOLN, Indications: 60ml to irrigate baca once a week on Friday, Disp: , Rfl:     clopidogrel (PLAVIX) 75 mg tablet, Take 1 tablet (75 mg total) by mouth daily, Disp: 90 tablet, Rfl: 0    clotrimazole (LOTRIMIN) 1 % cream, Apply topically 3 (three) times a day, Disp: , Rfl:     digoxin (LANOXIN) 0 125 mg tablet, Take 125 mcg by mouth daily, Disp: , Rfl:     divalproex sodium (DEPAKOTE ER) 250 mg 24 hr tablet, Take 1 tablet (250 mg total) by mouth 2 (two) times a day, Disp: , Rfl:     fenofibrate micronized (LOFIBRA) 67 MG capsule, Take 1 capsule (67 mg total) by mouth daily with breakfast, Disp: 15 capsule, Rfl: 0    ferrous sulfate 325 (65 Fe) mg tablet, Take 1 tablet (325 mg total) by mouth 2 (two) times a day, Disp: 30 tablet, Rfl: 0    fluticasone-vilanterol (BREO ELLIPTA) 100-25 mcg/inh inhaler, Inhale 1 puff daily, Disp: 1 Inhaler, Rfl: 0    furosemide (LASIX) 40 mg tablet, Take 1 tablet (40 mg total) by mouth daily, Disp: 90 tablet, Rfl: 4    lactobacillus acidophilus, Take 1 capsule by mouth daily, Disp: , Rfl:     levothyroxine 88 mcg tablet, Take 1 tablet (88 mcg total) by mouth daily, Disp: 14 tablet, Rfl: 0    metoprolol tartrate (LOPRESSOR) 100 mg tablet, Take 1 tablet (100 mg total) by mouth every 12 (twelve) hours, Disp: 180 tablet, Rfl: 3    nitroglycerin (NITROSTAT) 0 4 mg SL tablet, Place 1 tablet (0 4 mg total) under the tongue every 5 (five) minutes as needed for chest pain, Disp: 90 tablet, Rfl: 0    potassium chloride (K-DUR,KLOR-CON) 20 mEq tablet, Take 1 tablet (20 mEq total) by mouth daily, Disp: 15 tablet, Rfl: 0    saccharomyces boulardii (FLORASTOR) 250 mg capsule, Take 1 capsule (250 mg total) by mouth 2 (two) times a day, Disp: , Rfl: 0    Sennosides-Docusate Sodium (SENNA PLUS PO), Take by mouth daily as needed, Disp: , Rfl:     warfarin (COUMADIN) 3 mg tablet, One tablet daily or as otherwise directed , Disp: 30 tablet, Rfl: 0    LORazepam (ATIVAN) 0 5 mg tablet, Take 1 tablet (0 5 mg total) by mouth 2 (two) times a day for 5 doses, Disp: 20 tablet, Rfl: 0       Allergies   Allergen Reactions    Spiriva Handihaler [Tiotropium Bromide Monohydrate] Swelling    Ramipril Facial Swelling    Penicillins      unsure       Labs:  Results for Carol Jones (MRN [id number]) as of 12/6/2019 10:47   10/28/2019 13:27   Sodium 150 (H)   Potassium 3 9   Chloride 108   CO2 32   Anion Gap 10   BUN 30 (H)   Creatinine 1 78 (H)   Glucose, Random 95   GLUCOSE FASTING 95   Calcium 9 9   eGFR 28   Magnesium 2 2     Results for Carol Jones (MRN [id number]) as of 12/6/2019 10:47   10/28/2019 12:47   WBC 5 56   Red Blood Cell Count 3 98   Hemoglobin 13 4   HCT 41 2    (H)   MCH 33 7   MCHC 32 5   RDW 13 0   Platelet Count 742     Results for Carol Jones (MRN [id number]) as of 12/6/2019 10:47   10/28/2019 12:47 11/15/2019 00:00 12/4/2019 00:00   INR 1 15 1 80 (A) 2 10 (A)       Imaging: No results found      Review of Systems:  Review of Systems   Unable to perform ROS: Dementia       Physical Exam:  Physical Exam   Constitutional: She is oriented to person, place, and time  She appears well-developed and well-nourished  HENT:   Head: Normocephalic and atraumatic  Eyes: Conjunctivae and EOM are normal    Neck: No JVD present  Carotid bruit is not present  No tracheal deviation present  No thyromegaly present  Cardiovascular: Normal rate, regular rhythm, S1 normal and S2 normal   No extrasystoles are present  PMI is not displaced  Exam reveals no gallop  No murmur heard  Pulses:       Carotid pulses are 2+ on the right side, and 2+ on the left side  Radial pulses are 2+ on the right side, and 2+ on the left side  Pulmonary/Chest: Breath sounds normal  No accessory muscle usage  No respiratory distress  Abdominal: Soft  Bowel sounds are normal  She exhibits no distension  There is no tenderness  Musculoskeletal: She exhibits no edema  Lymphadenopathy:        Head (right side): No submental, no submandibular, no tonsillar, no preauricular, no posterior auricular and no occipital adenopathy present  Head (left side): No submental, no submandibular, no tonsillar, no preauricular, no posterior auricular and no occipital adenopathy present  Right cervical: No superficial cervical adenopathy present  Left cervical: No superficial cervical adenopathy present  Neurological: She is alert and oriented to person, place, and time  Skin: Skin is warm and dry  Discussion/Summary:  Atrial flutter/fibrillation, controlled  Heart failure with mildly reduced ejection fraction  Systolic cardiomyopathy, ejection fraction 45%  Peripheral arterial disease status post complex lower extremity revascularization and covered stent placement after stent fracture  History of coronary artery disease status post prior PCI  Chronic kidney disease stage 3  Dementia      From a medical standpoint, Missy Grubbs is doing quite well  I am pleased with her recovery from her most recent hospitalization    Her INR is therapeutic and she is receiving all of the medications that she should be receiving  Her medications from her [address] home were reviewed and appeared to be accurate  We discussed our overall care strategy and are still in agreement that a conservative approach is best   We are going to do our best to minimize any in all invasive procedures and changes in medications  There is no medical contraindication to her going to [place] via a airplane  I would like her to return to our office in 6 months, sooner if any acute issues arise  Thank you for the opportunity to consult on this patient  If you have any questions, please feel free to call my office

## 2019-12-12 ENCOUNTER — ANTICOAG VISIT (OUTPATIENT)
Dept: CARDIOLOGY CLINIC | Facility: CLINIC | Age: 74
End: 2019-12-12

## 2019-12-12 DIAGNOSIS — I48.0 PAROXYSMAL ATRIAL FIBRILLATION (HCC): ICD-10-CM

## 2019-12-12 LAB — INR PPP: 2.2 (ref 0.84–1.19)

## 2019-12-12 NOTE — PROGRESS NOTES
S/w Kevin Gottlieb from [facility]  Instructed to stay on the same dose and retest in one week

## 2019-12-19 ENCOUNTER — TELEPHONE (OUTPATIENT)
Dept: CARDIOLOGY CLINIC | Facility: CLINIC | Age: 74
End: 2019-12-19

## 2019-12-19 DIAGNOSIS — I48.0 PAROXYSMAL ATRIAL FIBRILLATION (HCC): Primary | ICD-10-CM

## 2019-12-19 DIAGNOSIS — Z79.01 LONG TERM (CURRENT) USE OF ANTICOAGULANTS: ICD-10-CM

## 2019-12-19 NOTE — TELEPHONE ENCOUNTER
I lm on daughter's cell stating that Leslee Que was to get an INR 12/18 and that is needed so we can reschedule her cardio version  I asked that she take her mom to a  facility to have it drawn  I placed the order

## 2019-12-19 NOTE — TELEPHONE ENCOUNTER
Daughter called me back and said that when they saw Dr Jeaneth Jones on 12/6, he said to hold off on everything for now and the cardio version is not needed at this time  I still asked her to get the INR drawn because Stiven Lopez informed me that the mobile lab comes on Wed and will be off Priyanka and New years

## 2019-12-19 NOTE — TELEPHONE ENCOUNTER
Grant Pulido wanted to let us know that pt's family took ot out of Grant Pulido for the [employer] know when she will be returning so that's why pt's PT INR wasn't done on 12/18/19

## 2019-12-20 ENCOUNTER — ANTICOAG VISIT (OUTPATIENT)
Dept: CARDIOLOGY CLINIC | Facility: CLINIC | Age: 74
End: 2019-12-20

## 2019-12-20 ENCOUNTER — APPOINTMENT (OUTPATIENT)
Dept: LAB | Facility: HOSPITAL | Age: 74
End: 2019-12-20
Attending: INTERNAL MEDICINE
Payer: MEDICARE

## 2019-12-20 DIAGNOSIS — I48.0 PAROXYSMAL ATRIAL FIBRILLATION (HCC): ICD-10-CM

## 2019-12-20 LAB
INR PPP: 2.63 (ref 0.84–1.19)
PROTHROMBIN TIME: 28.4 SECONDS (ref 11.6–14.5)

## 2019-12-20 PROCEDURE — 85610 PROTHROMBIN TIME: CPT

## 2019-12-20 PROCEDURE — 36415 COLL VENOUS BLD VENIPUNCTURE: CPT

## 2019-12-20 NOTE — PROGRESS NOTES
I lmom on [facility] home ([phone]) stating that the pt can stay on the same dose and retest again in 2 weeks  Also said they can pick back up managing it since she is no longer having the Cardioversion we no longer need her results

## 2019-12-23 ENCOUNTER — APPOINTMENT (EMERGENCY)
Dept: CT IMAGING | Facility: HOSPITAL | Age: 74
DRG: 304 | End: 2019-12-23
Payer: MEDICARE

## 2019-12-23 ENCOUNTER — APPOINTMENT (EMERGENCY)
Dept: RADIOLOGY | Facility: HOSPITAL | Age: 74
DRG: 304 | End: 2019-12-23
Payer: MEDICARE

## 2019-12-23 ENCOUNTER — HOSPITAL ENCOUNTER (INPATIENT)
Facility: HOSPITAL | Age: 74
LOS: 2 days | Discharge: HOME HEALTH | DRG: 304 | End: 2019-12-26
Attending: EMERGENCY MEDICINE | Admitting: INTERNAL MEDICINE
Payer: MEDICARE

## 2019-12-23 DIAGNOSIS — N17.9 AKI (ACUTE KIDNEY INJURY) (HCC): ICD-10-CM

## 2019-12-23 DIAGNOSIS — I10 HYPERTENSION: ICD-10-CM

## 2019-12-23 DIAGNOSIS — R26.2 AMBULATORY DYSFUNCTION: ICD-10-CM

## 2019-12-23 DIAGNOSIS — Z97.8 CHRONIC INDWELLING FOLEY CATHETER: ICD-10-CM

## 2019-12-23 DIAGNOSIS — N39.0 UTI (URINARY TRACT INFECTION): ICD-10-CM

## 2019-12-23 DIAGNOSIS — R41.0 CONFUSION: Primary | ICD-10-CM

## 2019-12-23 DIAGNOSIS — Z86.73 HISTORY OF STROKE: ICD-10-CM

## 2019-12-23 DIAGNOSIS — I48.0 PAROXYSMAL ATRIAL FIBRILLATION (HCC): ICD-10-CM

## 2019-12-23 LAB
ALBUMIN SERPL BCP-MCNC: 3.3 G/DL (ref 3.5–5)
ALP SERPL-CCNC: 67 U/L (ref 46–116)
ALT SERPL W P-5'-P-CCNC: 25 U/L (ref 12–78)
ANION GAP SERPL CALCULATED.3IONS-SCNC: 6 MMOL/L (ref 4–13)
APTT PPP: 37 SECONDS (ref 23–37)
AST SERPL W P-5'-P-CCNC: 18 U/L (ref 5–45)
BASOPHILS # BLD AUTO: 0.03 THOUSANDS/ΜL (ref 0–0.1)
BASOPHILS NFR BLD AUTO: 1 % (ref 0–1)
BILIRUB DIRECT SERPL-MCNC: 0.16 MG/DL (ref 0–0.2)
BILIRUB SERPL-MCNC: 0.3 MG/DL (ref 0.2–1)
BILIRUB UR QL STRIP: NEGATIVE
BUN SERPL-MCNC: 24 MG/DL (ref 5–25)
CALCIUM SERPL-MCNC: 9.3 MG/DL (ref 8.3–10.1)
CHLORIDE SERPL-SCNC: 107 MMOL/L (ref 100–108)
CLARITY UR: ABNORMAL
CO2 SERPL-SCNC: 33 MMOL/L (ref 21–32)
COLOR UR: YELLOW
CREAT SERPL-MCNC: 1.39 MG/DL (ref 0.6–1.3)
EOSINOPHIL # BLD AUTO: 0.18 THOUSAND/ΜL (ref 0–0.61)
EOSINOPHIL NFR BLD AUTO: 3 % (ref 0–6)
ERYTHROCYTE [DISTWIDTH] IN BLOOD BY AUTOMATED COUNT: 14.3 % (ref 11.6–15.1)
GFR SERPL CREATININE-BSD FRML MDRD: 38 ML/MIN/1.73SQ M
GLUCOSE SERPL-MCNC: 200 MG/DL (ref 65–140)
GLUCOSE UR STRIP-MCNC: ABNORMAL MG/DL
HCT VFR BLD AUTO: 37.9 % (ref 34.8–46.1)
HGB BLD-MCNC: 12.2 G/DL (ref 11.5–15.4)
HGB UR QL STRIP.AUTO: ABNORMAL
IMM GRANULOCYTES # BLD AUTO: 0.11 THOUSAND/UL (ref 0–0.2)
IMM GRANULOCYTES NFR BLD AUTO: 2 % (ref 0–2)
INR PPP: 2.6 (ref 0.84–1.19)
KETONES UR STRIP-MCNC: NEGATIVE MG/DL
LACTATE SERPL-SCNC: 1.5 MMOL/L (ref 0.5–2)
LEUKOCYTE ESTERASE UR QL STRIP: ABNORMAL
LYMPHOCYTES # BLD AUTO: 1.5 THOUSANDS/ΜL (ref 0.6–4.47)
LYMPHOCYTES NFR BLD AUTO: 25 % (ref 14–44)
MCH RBC QN AUTO: 33.2 PG (ref 26.8–34.3)
MCHC RBC AUTO-ENTMCNC: 32.2 G/DL (ref 31.4–37.4)
MCV RBC AUTO: 103 FL (ref 82–98)
MONOCYTES # BLD AUTO: 0.46 THOUSAND/ΜL (ref 0.17–1.22)
MONOCYTES NFR BLD AUTO: 8 % (ref 4–12)
NEUTROPHILS # BLD AUTO: 3.75 THOUSANDS/ΜL (ref 1.85–7.62)
NEUTS SEG NFR BLD AUTO: 61 % (ref 43–75)
NITRITE UR QL STRIP: NEGATIVE
NRBC BLD AUTO-RTO: 0 /100 WBCS
PH UR STRIP.AUTO: 8 [PH]
PLATELET # BLD AUTO: 137 THOUSANDS/UL (ref 149–390)
PMV BLD AUTO: 11.9 FL (ref 8.9–12.7)
POTASSIUM SERPL-SCNC: 3.7 MMOL/L (ref 3.5–5.3)
PROT SERPL-MCNC: 7.2 G/DL (ref 6.4–8.2)
PROT UR STRIP-MCNC: ABNORMAL MG/DL
PROTHROMBIN TIME: 28.1 SECONDS (ref 11.6–14.5)
RBC # BLD AUTO: 3.67 MILLION/UL (ref 3.81–5.12)
SODIUM SERPL-SCNC: 146 MMOL/L (ref 136–145)
SP GR UR STRIP.AUTO: 1.02 (ref 1–1.03)
TROPONIN I SERPL-MCNC: 0.05 NG/ML
UROBILINOGEN UR QL STRIP.AUTO: 0.2 E.U./DL
WBC # BLD AUTO: 6.03 THOUSAND/UL (ref 4.31–10.16)

## 2019-12-23 PROCEDURE — 36415 COLL VENOUS BLD VENIPUNCTURE: CPT | Performed by: EMERGENCY MEDICINE

## 2019-12-23 PROCEDURE — 87040 BLOOD CULTURE FOR BACTERIA: CPT | Performed by: EMERGENCY MEDICINE

## 2019-12-23 PROCEDURE — 83605 ASSAY OF LACTIC ACID: CPT | Performed by: EMERGENCY MEDICINE

## 2019-12-23 PROCEDURE — 84484 ASSAY OF TROPONIN QUANT: CPT | Performed by: EMERGENCY MEDICINE

## 2019-12-23 PROCEDURE — 70450 CT HEAD/BRAIN W/O DYE: CPT

## 2019-12-23 PROCEDURE — 85025 COMPLETE CBC W/AUTO DIFF WBC: CPT | Performed by: EMERGENCY MEDICINE

## 2019-12-23 PROCEDURE — 85730 THROMBOPLASTIN TIME PARTIAL: CPT | Performed by: EMERGENCY MEDICINE

## 2019-12-23 PROCEDURE — 80048 BASIC METABOLIC PNL TOTAL CA: CPT | Performed by: EMERGENCY MEDICINE

## 2019-12-23 PROCEDURE — 80076 HEPATIC FUNCTION PANEL: CPT | Performed by: EMERGENCY MEDICINE

## 2019-12-23 PROCEDURE — 99285 EMERGENCY DEPT VISIT HI MDM: CPT

## 2019-12-23 PROCEDURE — 85610 PROTHROMBIN TIME: CPT | Performed by: EMERGENCY MEDICINE

## 2019-12-23 PROCEDURE — 0T2BX0Z CHANGE DRAINAGE DEVICE IN BLADDER, EXTERNAL APPROACH: ICD-10-PCS | Performed by: EMERGENCY MEDICINE

## 2019-12-23 PROCEDURE — 96375 TX/PRO/DX INJ NEW DRUG ADDON: CPT

## 2019-12-23 PROCEDURE — 87631 RESP VIRUS 3-5 TARGETS: CPT | Performed by: EMERGENCY MEDICINE

## 2019-12-23 PROCEDURE — 99285 EMERGENCY DEPT VISIT HI MDM: CPT | Performed by: EMERGENCY MEDICINE

## 2019-12-23 PROCEDURE — 93005 ELECTROCARDIOGRAM TRACING: CPT

## 2019-12-23 PROCEDURE — 71046 X-RAY EXAM CHEST 2 VIEWS: CPT

## 2019-12-23 PROCEDURE — 81001 URINALYSIS AUTO W/SCOPE: CPT | Performed by: EMERGENCY MEDICINE

## 2019-12-23 RX ORDER — LABETALOL 20 MG/4 ML (5 MG/ML) INTRAVENOUS SYRINGE
10 ONCE
Status: COMPLETED | OUTPATIENT
Start: 2019-12-23 | End: 2019-12-23

## 2019-12-23 RX ADMIN — LABETALOL 20 MG/4 ML (5 MG/ML) INTRAVENOUS SYRINGE 10 MG: at 23:40

## 2019-12-24 PROBLEM — I48.0 PAROXYSMAL ATRIAL FIBRILLATION (HCC): Status: ACTIVE | Noted: 2019-10-29

## 2019-12-24 PROBLEM — N30.01 ACUTE CYSTITIS WITH HEMATURIA: Status: ACTIVE | Noted: 2017-12-10

## 2019-12-24 PROBLEM — G93.41 ACUTE METABOLIC ENCEPHALOPATHY: Status: ACTIVE | Noted: 2019-12-24

## 2019-12-24 PROBLEM — I16.0 HYPERTENSIVE URGENCY: Status: ACTIVE | Noted: 2017-12-10

## 2019-12-24 LAB
ANION GAP SERPL CALCULATED.3IONS-SCNC: 8 MMOL/L (ref 4–13)
BACTERIA UR QL AUTO: ABNORMAL /HPF
BUN SERPL-MCNC: 23 MG/DL (ref 5–25)
CALCIUM SERPL-MCNC: 8.8 MG/DL (ref 8.3–10.1)
CHLORIDE SERPL-SCNC: 106 MMOL/L (ref 100–108)
CO2 SERPL-SCNC: 27 MMOL/L (ref 21–32)
CREAT SERPL-MCNC: 1.31 MG/DL (ref 0.6–1.3)
ERYTHROCYTE [DISTWIDTH] IN BLOOD BY AUTOMATED COUNT: 14.2 % (ref 11.6–15.1)
FLUAV RNA NPH QL NAA+PROBE: NORMAL
FLUBV RNA NPH QL NAA+PROBE: NORMAL
GFR SERPL CREATININE-BSD FRML MDRD: 40 ML/MIN/1.73SQ M
GLUCOSE SERPL-MCNC: 137 MG/DL (ref 65–140)
HCT VFR BLD AUTO: 35.7 % (ref 34.8–46.1)
HGB BLD-MCNC: 11.5 G/DL (ref 11.5–15.4)
INR PPP: 2.94 (ref 0.84–1.19)
MAGNESIUM SERPL-MCNC: 1.8 MG/DL (ref 1.6–2.6)
MCH RBC QN AUTO: 33.1 PG (ref 26.8–34.3)
MCHC RBC AUTO-ENTMCNC: 32.2 G/DL (ref 31.4–37.4)
MCV RBC AUTO: 103 FL (ref 82–98)
NON-SQ EPI CELLS URNS QL MICRO: ABNORMAL /HPF
PLATELET # BLD AUTO: 125 THOUSANDS/UL (ref 149–390)
PMV BLD AUTO: 11.9 FL (ref 8.9–12.7)
POTASSIUM SERPL-SCNC: 3.7 MMOL/L (ref 3.5–5.3)
PROTHROMBIN TIME: 31.1 SECONDS (ref 11.6–14.5)
RBC # BLD AUTO: 3.47 MILLION/UL (ref 3.81–5.12)
RBC #/AREA URNS AUTO: ABNORMAL /HPF
RSV RNA NPH QL NAA+PROBE: NORMAL
SODIUM SERPL-SCNC: 141 MMOL/L (ref 136–145)
TROPONIN I SERPL-MCNC: 0.08 NG/ML
TROPONIN I SERPL-MCNC: 0.11 NG/ML
WBC # BLD AUTO: 6.95 THOUSAND/UL (ref 4.31–10.16)
WBC #/AREA URNS AUTO: ABNORMAL /HPF

## 2019-12-24 PROCEDURE — G8978 MOBILITY CURRENT STATUS: HCPCS

## 2019-12-24 PROCEDURE — 87086 URINE CULTURE/COLONY COUNT: CPT | Performed by: NURSE PRACTITIONER

## 2019-12-24 PROCEDURE — 36415 COLL VENOUS BLD VENIPUNCTURE: CPT | Performed by: INTERNAL MEDICINE

## 2019-12-24 PROCEDURE — 85027 COMPLETE CBC AUTOMATED: CPT | Performed by: INTERNAL MEDICINE

## 2019-12-24 PROCEDURE — 85610 PROTHROMBIN TIME: CPT | Performed by: INTERNAL MEDICINE

## 2019-12-24 PROCEDURE — 97163 PT EVAL HIGH COMPLEX 45 MIN: CPT

## 2019-12-24 PROCEDURE — 99223 1ST HOSP IP/OBS HIGH 75: CPT | Performed by: INTERNAL MEDICINE

## 2019-12-24 PROCEDURE — 84484 ASSAY OF TROPONIN QUANT: CPT | Performed by: INTERNAL MEDICINE

## 2019-12-24 PROCEDURE — 83735 ASSAY OF MAGNESIUM: CPT | Performed by: INTERNAL MEDICINE

## 2019-12-24 PROCEDURE — 87186 SC STD MICRODIL/AGAR DIL: CPT | Performed by: NURSE PRACTITIONER

## 2019-12-24 PROCEDURE — G8979 MOBILITY GOAL STATUS: HCPCS

## 2019-12-24 PROCEDURE — 87077 CULTURE AEROBIC IDENTIFY: CPT | Performed by: NURSE PRACTITIONER

## 2019-12-24 PROCEDURE — 96365 THER/PROPH/DIAG IV INF INIT: CPT

## 2019-12-24 PROCEDURE — 80048 BASIC METABOLIC PNL TOTAL CA: CPT | Performed by: INTERNAL MEDICINE

## 2019-12-24 RX ORDER — CLOPIDOGREL BISULFATE 75 MG/1
75 TABLET ORAL DAILY
Status: DISCONTINUED | OUTPATIENT
Start: 2019-12-24 | End: 2019-12-26 | Stop reason: HOSPADM

## 2019-12-24 RX ORDER — FUROSEMIDE 40 MG/1
40 TABLET ORAL DAILY
Status: DISCONTINUED | OUTPATIENT
Start: 2019-12-24 | End: 2019-12-26 | Stop reason: HOSPADM

## 2019-12-24 RX ORDER — WARFARIN SODIUM 3 MG/1
3 TABLET ORAL
Status: DISCONTINUED | OUTPATIENT
Start: 2019-12-24 | End: 2019-12-26 | Stop reason: HOSPADM

## 2019-12-24 RX ORDER — DIGOXIN 125 MCG
125 TABLET ORAL DAILY
Status: DISCONTINUED | OUTPATIENT
Start: 2019-12-24 | End: 2019-12-26 | Stop reason: HOSPADM

## 2019-12-24 RX ORDER — FERROUS SULFATE 325(65) MG
325 TABLET ORAL 2 TIMES DAILY
Status: DISCONTINUED | OUTPATIENT
Start: 2019-12-24 | End: 2019-12-26 | Stop reason: HOSPADM

## 2019-12-24 RX ORDER — HYDRALAZINE HYDROCHLORIDE 20 MG/ML
5 INJECTION INTRAMUSCULAR; INTRAVENOUS EVERY 6 HOURS PRN
Status: DISCONTINUED | OUTPATIENT
Start: 2019-12-24 | End: 2019-12-26 | Stop reason: HOSPADM

## 2019-12-24 RX ORDER — ATORVASTATIN CALCIUM 10 MG/1
10 TABLET, FILM COATED ORAL DAILY
Status: DISCONTINUED | OUTPATIENT
Start: 2019-12-24 | End: 2019-12-26 | Stop reason: HOSPADM

## 2019-12-24 RX ORDER — LEVOFLOXACIN 5 MG/ML
250 INJECTION, SOLUTION INTRAVENOUS EVERY 24 HOURS
Status: DISCONTINUED | OUTPATIENT
Start: 2019-12-25 | End: 2019-12-24

## 2019-12-24 RX ORDER — NITROGLYCERIN 0.4 MG/1
0.4 TABLET SUBLINGUAL
Status: DISCONTINUED | OUTPATIENT
Start: 2019-12-24 | End: 2019-12-26 | Stop reason: HOSPADM

## 2019-12-24 RX ORDER — ALBUTEROL SULFATE 90 UG/1
2 AEROSOL, METERED RESPIRATORY (INHALATION) EVERY 6 HOURS PRN
Status: DISCONTINUED | OUTPATIENT
Start: 2019-12-24 | End: 2019-12-26 | Stop reason: HOSPADM

## 2019-12-24 RX ORDER — DIVALPROEX SODIUM 250 MG/1
250 TABLET, EXTENDED RELEASE ORAL 2 TIMES DAILY
Status: DISCONTINUED | OUTPATIENT
Start: 2019-12-24 | End: 2019-12-26 | Stop reason: HOSPADM

## 2019-12-24 RX ORDER — AMOXICILLIN 250 MG
1 CAPSULE ORAL DAILY PRN
Status: DISCONTINUED | OUTPATIENT
Start: 2019-12-24 | End: 2019-12-26 | Stop reason: HOSPADM

## 2019-12-24 RX ORDER — FLUTICASONE FUROATE AND VILANTEROL 100; 25 UG/1; UG/1
1 POWDER RESPIRATORY (INHALATION) DAILY
Status: DISCONTINUED | OUTPATIENT
Start: 2019-12-24 | End: 2019-12-26 | Stop reason: HOSPADM

## 2019-12-24 RX ORDER — METOPROLOL TARTRATE 50 MG/1
100 TABLET, FILM COATED ORAL EVERY 12 HOURS SCHEDULED
Status: DISCONTINUED | OUTPATIENT
Start: 2019-12-24 | End: 2019-12-26 | Stop reason: HOSPADM

## 2019-12-24 RX ORDER — ACETAMINOPHEN 325 MG/1
650 TABLET ORAL ONCE
Status: COMPLETED | OUTPATIENT
Start: 2019-12-24 | End: 2019-12-24

## 2019-12-24 RX ORDER — MELATONIN
2000 DAILY
Status: DISCONTINUED | OUTPATIENT
Start: 2019-12-24 | End: 2019-12-26 | Stop reason: HOSPADM

## 2019-12-24 RX ORDER — LEVOTHYROXINE SODIUM 88 UG/1
88 TABLET ORAL DAILY
Status: DISCONTINUED | OUTPATIENT
Start: 2019-12-24 | End: 2019-12-26 | Stop reason: HOSPADM

## 2019-12-24 RX ORDER — BUPROPION HYDROCHLORIDE 150 MG/1
150 TABLET, EXTENDED RELEASE ORAL DAILY
Status: DISCONTINUED | OUTPATIENT
Start: 2019-12-24 | End: 2019-12-26 | Stop reason: HOSPADM

## 2019-12-24 RX ORDER — LEVOFLOXACIN 5 MG/ML
750 INJECTION, SOLUTION INTRAVENOUS ONCE
Status: COMPLETED | OUTPATIENT
Start: 2019-12-24 | End: 2019-12-24

## 2019-12-24 RX ORDER — CARBAMAZEPINE 100 MG/1
100 TABLET, CHEWABLE ORAL 3 TIMES DAILY
Status: DISCONTINUED | OUTPATIENT
Start: 2019-12-24 | End: 2019-12-26 | Stop reason: HOSPADM

## 2019-12-24 RX ORDER — SACCHAROMYCES BOULARDII 250 MG
250 CAPSULE ORAL 2 TIMES DAILY
Status: DISCONTINUED | OUTPATIENT
Start: 2019-12-24 | End: 2019-12-26 | Stop reason: HOSPADM

## 2019-12-24 RX ORDER — CLOTRIMAZOLE 1 %
CREAM (GRAM) TOPICAL 3 TIMES DAILY
Status: DISCONTINUED | OUTPATIENT
Start: 2019-12-24 | End: 2019-12-24

## 2019-12-24 RX ADMIN — LEVOFLOXACIN 750 MG: 5 INJECTION, SOLUTION INTRAVENOUS at 00:31

## 2019-12-24 RX ADMIN — HYDRALAZINE HYDROCHLORIDE 5 MG: 20 INJECTION INTRAMUSCULAR; INTRAVENOUS at 20:36

## 2019-12-24 RX ADMIN — METOPROLOL TARTRATE 100 MG: 50 TABLET, FILM COATED ORAL at 10:08

## 2019-12-24 RX ADMIN — CEFTRIAXONE SODIUM 1000 MG: 10 INJECTION, POWDER, FOR SOLUTION INTRAVENOUS at 16:08

## 2019-12-24 RX ADMIN — Medication 250 MG: at 10:01

## 2019-12-24 RX ADMIN — ATORVASTATIN CALCIUM 10 MG: 10 TABLET, FILM COATED ORAL at 17:56

## 2019-12-24 RX ADMIN — ACETAMINOPHEN 650 MG: 325 TABLET, FILM COATED ORAL at 00:27

## 2019-12-24 RX ADMIN — FUROSEMIDE 40 MG: 40 TABLET ORAL at 10:05

## 2019-12-24 RX ADMIN — METOPROLOL TARTRATE 100 MG: 50 TABLET, FILM COATED ORAL at 02:55

## 2019-12-24 RX ADMIN — CARBAMAZEPINE 100 MG: 100 TABLET, CHEWABLE ORAL at 10:05

## 2019-12-24 RX ADMIN — DIGOXIN 125 MCG: 125 TABLET ORAL at 10:06

## 2019-12-24 RX ADMIN — VITAMIN D, TAB 1000IU (100/BT) 2000 UNITS: 25 TAB at 10:05

## 2019-12-24 RX ADMIN — WARFARIN SODIUM 3 MG: 3 TABLET ORAL at 17:59

## 2019-12-24 RX ADMIN — FERROUS SULFATE TAB 325 MG (65 MG ELEMENTAL FE) 325 MG: 325 (65 FE) TAB at 17:56

## 2019-12-24 RX ADMIN — CARBAMAZEPINE 100 MG: 100 TABLET, CHEWABLE ORAL at 20:36

## 2019-12-24 RX ADMIN — FLUTICASONE FUROATE AND VILANTEROL TRIFENATATE 1 PUFF: 100; 25 POWDER RESPIRATORY (INHALATION) at 10:10

## 2019-12-24 RX ADMIN — DIVALPROEX SODIUM 250 MG: 250 TABLET, FILM COATED, EXTENDED RELEASE ORAL at 17:57

## 2019-12-24 RX ADMIN — Medication 250 MG: at 17:56

## 2019-12-24 RX ADMIN — FERROUS SULFATE TAB 325 MG (65 MG ELEMENTAL FE) 325 MG: 325 (65 FE) TAB at 10:06

## 2019-12-24 RX ADMIN — LEVOTHYROXINE SODIUM 88 MCG: 88 TABLET ORAL at 10:04

## 2019-12-24 RX ADMIN — METOPROLOL TARTRATE 100 MG: 50 TABLET, FILM COATED ORAL at 20:36

## 2019-12-24 RX ADMIN — DIVALPROEX SODIUM 250 MG: 250 TABLET, FILM COATED, EXTENDED RELEASE ORAL at 10:09

## 2019-12-24 RX ADMIN — CLOPIDOGREL BISULFATE 75 MG: 75 TABLET ORAL at 10:02

## 2019-12-24 RX ADMIN — CARBAMAZEPINE 100 MG: 100 TABLET, CHEWABLE ORAL at 17:57

## 2019-12-24 RX ADMIN — BUPROPION HYDROCHLORIDE 150 MG: 150 TABLET, FILM COATED, EXTENDED RELEASE ORAL at 10:23

## 2019-12-24 NOTE — H&P
H&P- John Garvin 1945, 69 y o  female MRN: [id number]    Unit/Bed#: [facility] Encounter: [id number]    Primary Care Provider: Jf Ruiz DO   Date and time admitted to hospital: 12/23/2019  9:18 PM        * Acute cystitis with hematuria  Assessment & Plan  · Had presented with generalized weakness and apparent confusion  · UA with pyuria and bacteriuria with hematuria; has chronic indwelling baca  · Received Levaquin in ED given h/o penicillin allergy with unclear reaction, will continue for now pending results of urine culture  · Trend WBC, temperature curve    Hypertensive urgency  Assessment & Plan  · Presenting with markedly elevated blood pressures; had not taken antihypertensives today  · Improving with IV labetalol in ED  · Will restart home antihypertensives in a m , continue blood pressure monitoring per protocol    Paroxysmal atrial fibrillation (HCC)  Assessment & Plan  · Continue metoprolol and digoxin  · Anticoagulated on warfarin, check INR    Coronary artery disease involving native coronary artery of native heart without angina pectoris  Assessment & Plan  · Continue Plavix, beta-blocker, statin    Stage 3 chronic kidney disease (HCC)  Assessment & Plan  · Creatinine below baseline  · Monitor with BMP, avoid nephrotoxins as able and renally dose medications    Hyperlipidemia  Assessment & Plan  · Continue statin therapy    Dementia ([facility] [address] )  Assessment & Plan  · Currently at baseline per daughter  · Delirium precautions and fall precautions    Elevated troponin  Assessment & Plan  · Slightly elevated to 0 05 on admission, patient denies any chest pain/pressure or anginal symptoms however somewhat limited by dementia  · EKG without ischemic change  · Continue to trend troponin, telemetry monitoring      VTE Prophylaxis: Warfarin (Coumadin)  / sequential compression device   Code Status: Level 1 - Full Code  POLST: POLST form is not discussed and not completed at this time      Anticipated Length of Stay:  Patient will be admitted on an Inpatient basis with an anticipated length of stay of  Greater than 2 midnights  Justification for Hospital Stay: Please see detailed plans noted above  Chief Complaint:     Generalized weakness and confusion  History of Present Illness: Tamika Willoughby is a 69 y o  female who has a past medical history significant for CAD, CHF, chronic kidney disease stage 3, hyperlipidemia, hypertension, chronic indwelling Davis, and dementia  As such, patient is poor historian and bulk of history is obtained from daughter  Patient presents with increasing generalized weakness and confusion; per daughter, patient had not been acting like herself for several days with increased confusion  Apparently had not taken antihypertensives today additionally  Denied any known presence of fevers/chills, abdominal pain/nausea/vomiting, chest pain/pressure, shortness of breath  Apparently in ED, patient quickly returning to baseline mental status and endorsed by daughter to me  Found to have a UA with hematuria, pyuria, and bacteriuria for which she was started on Levaquin      Review of Systems:  Please note limited reliability in setting of dementia  Constitutional:  Denies fever or chills   Eyes:  Denies change in visual acuity   HENT:  Denies nasal congestion or sore throat   Respiratory:  Denies cough or shortness of breath   Cardiovascular:  Denies chest pain or edema   GI:  Denies abdominal pain, nausea, vomiting, bloody stools or diarrhea   :  Denies dysuria   Musculoskeletal:  Denies back pain or joint pain   Integument:  Denies rash   Neurologic:  Denies headache, focal weakness or sensory changes   Endocrine:  Denies polyuria or polydipsia   Lymphatic:  Denies swollen glands   Psychiatric:  Denies depression or anxiety     Past Medical and Surgical History:   Past Medical History:   Diagnosis Date    Atrial fibrillation ([facility] [address] )     Brain aneurysm     CAD (coronary artery disease)     Cardiac disease     had stents 12 years ago in [id number] CKD (chronic kidney disease) stage 3, GFR 30-59 ml/min (HCC) ckd    COPD (chronic obstructive pulmonary disease) (HCC)     Dementia (HCC)     Diastolic CHF, chronic (HCC)     GERD (gastroesophageal reflux disease)     Hyperlipidemia     Hyperlipidemia     Hypertension     Hypothyroidism     Migraine     PAD (peripheral artery disease) (HCC)     Renal disorder     Seizures ([address] )     Stroke [facility])      Past Surgical History:   Procedure Laterality Date    APPENDECTOMY      ARTERIOGRAM N/A 12/7/2018    Procedure: ARTERIOGRAM WITH STENT PLACEMENT;  Surgeon: Elby Seip Doctor, MD;  [facility]: [facility];  Service: Vascular    BLADDER TUMOR EXCISION     [address]  clip right frontal lobe    CARDIAC SURGERY      COLONOSCOPY      CORONARY STENT PLACEMENT      5 stents    EYE SURGERY      IR ABDOMINAL ANGIOGRAPHY / INTERVENTION  12/7/2018    MANDIBLE FRACTURE SURGERY      TONSILLECTOMY      TUBAL LIGATION      UTERINE FIBROID SURGERY         Meds/Allergies:    Current Facility-Administered Medications:     albuterol (PROVENTIL HFA,VENTOLIN HFA) inhaler 2 puff, 2 puff, Inhalation, Q6H PRN, Kimberley Alu, DO    atorvastatin (LIPITOR) tablet 10 mg, 10 mg, Oral, Daily, Kimberley Alu, DO    buPROPion [facility] SR) 12 hr tablet 150 mg, 150 mg, Oral, Daily, Kimberley Alu, DO    carBAMazepine (TEGretol) chewable tablet 100 mg, 100 mg, Oral, TID, Kimberley Alu, DO    cholecalciferol (VITAMIN D3) tablet 2,000 Units, 2,000 Units, Oral, Daily, Kimberley Alu, DO    clopidogrel (PLAVIX) tablet 75 mg, 75 mg, Oral, Daily, Kimberley Alu, DO    clotrimazole (LOTRIMIN) 1 % cream, , Topical, TID, Kimberley Alu, DO    digoxin (LANOXIN) tablet 125 mcg, 125 mcg, Oral, Daily, Kimberley Alu, DO    divalproex sodium (DEPAKOTE ER) 24 hr tablet 250 mg, 250 mg, Oral, BID, Kimberley Alu, DO    ferrous sulfate tablet 325 mg, 325 mg, Oral, BID, [facility]    fluticasone-vilanterol (BREO ELLIPTA) 100-25 mcg/inh inhaler 1 puff, 1 puff, Inhalation, Daily, [facility]    furosemide (LASIX) tablet 40 mg, 40 mg, Oral, Daily, [facility]    [START ON 2019] levofloxacin (LEVAQUIN) IVPB (premix) 250 mg, 250 mg, Intravenous, Q24H, [facility]    levothyroxine tablet 88 mcg, 88 mcg, Oral, Daily, [facility]    metoprolol tartrate (LOPRESSOR) tablet 100 mg, 100 mg, Oral, Q12H [address], [facility], 100 mg at 19 0255    nitroglycerin (NITROSTAT) SL tablet 0 4 mg, 0 4 mg, Sublingual, Q5 Min PRN, [facility]    saccharomyces boulardii (FLORASTOR) capsule 250 mg, 250 mg, Oral, BID, [facility]    senna-docusate sodium (SENOKOT S) 8 6-50 mg per tablet 1 tablet, 1 tablet, Oral, Daily PRN, [facility]    warfarin (COUMADIN) tablet 3 mg, 3 mg, Oral, Daily (warfarin), [facility]    Allergies:    Allergies   Allergen Reactions    Spiriva Handihaler [Tiotropium Bromide Monohydrate] Swelling    Ramipril Facial Swelling    Penicillins      unsure     History:  Marital Status:      Substance Use History:   Social History     Substance and Sexual Activity   Alcohol Use Never    Alcohol/week: 0 0 standard drinks    Frequency: Never     Social History     Tobacco Use   Smoking Status Former Smoker    Packs/day: 5 00    Years: 40 00    Pack years: 200 00    Last attempt to quit: 2007    Years since quittin 3   Smokeless Tobacco Never Used     Social History     Substance and Sexual Activity   Drug Use No       Family History:  Family History   Problem Relation Age of Onset    Heart disease Father     Parkinsonism Father     Macular degeneration Mother     Glaucoma Mother     Diabetes Brother     Heart disease Brother        Physical Exam:     Vitals:   Blood Pressure: (!) 190/76 (19 0345)  Pulse: (!) 52 (19 0345)  Temperature: 98 °F (36 7 °C) (12/23/19 2126)  Temp Source: Oral (12/23/19 2126)  Respirations: 22 (12/24/19 0345)  Weight - Scale: 85 kg (187 lb 6 3 oz) (12/23/19 2126)  SpO2: 92 % (12/24/19 0345)    Constitutional:  Well developed, well nourished, no acute distress, non-toxic appearance   Eyes:  PERRL, conjunctiva normal   HENT:  Atraumatic, external ears normal, nose normal, oropharynx moist, no pharyngeal exudates  Neck- normal range of motion, no tenderness, supple   Respiratory:  No respiratory distress, normal breath sounds, no rales, no wheezing   Cardiovascular:  Normal rate, normal rhythm, no murmurs, no gallops, no rubs   GI:  Soft, nondistended, normal bowel sounds, nontender, no organomegaly, no mass, no rebound, no guarding   :  No costovertebral angle tenderness, chronic indwelling baca in place   Musculoskeletal:  No edema, no tenderness, no deformities  Back- no tenderness  Integument:  Well hydrated, no rash   Lymphatic:  No lymphadenopathy noted   Neurologic:  Alert &awake, communicative, CN 2-12 normal, normal motor function, normal sensory function, no focal deficits noted   Psychiatric:  Speech and behavior appropriate       Lab Results: I have personally reviewed pertinent reports  Results from last 7 days   Lab Units 12/24/19  0407 12/23/19  2234   WBC Thousand/uL 6 95 6 03   HEMOGLOBIN g/dL 11 5 12 2   HEMATOCRIT % 35 7 37 9   PLATELETS Thousands/uL 125* 137*   NEUTROS PCT %  --  61   LYMPHS PCT %  --  25   MONOS PCT %  --  8   EOS PCT %  --  3     Results from last 7 days   Lab Units 12/23/19  2234   POTASSIUM mmol/L 3 7   CHLORIDE mmol/L 107   CO2 mmol/L 33*   BUN mg/dL 24   CREATININE mg/dL 1 39*   CALCIUM mg/dL 9 3   ALK PHOS U/L 67   ALT U/L 25   AST U/L 18     Results from last 7 days   Lab Units 12/23/19  2234   INR  2 60*       EKG:  Sinus bradycardia    Imaging: I have personally reviewed pertinent reports        Ct Head Without Contrast    Result Date: 12/23/2019  Narrative: CT BRAIN - WITHOUT CONTRAST INDICATION:   confusion  COMPARISON:  CT head 10/8/2019  TECHNIQUE:  CT examination of the brain was performed  In addition to axial images, coronal 2D reformatted images were created and submitted for interpretation  Radiation dose length product (DLP) for this visit:  849 55 mGy-cm   This examination, like all CT scans performed in the [facility], was performed utilizing techniques to minimize radiation dose exposure, including the use of iterative  reconstruction and automated exposure control  IMAGE QUALITY:  Diagnostic  FINDINGS: PARENCHYMA: Decreased attenuation is noted in periventricular and subcortical white matter demonstrating an appearance that is statistically most likely to represent severemicroangiopathic change  Chronic right greater than left frontal lobe encephalomalacia  No CT signs of acute infarction  No intracranial mass, mass effect or midline shift  No acute parenchymal hemorrhage  Status post repair of intracranial aneurysm  VENTRICLES AND EXTRA-AXIAL SPACES:  Ventriculomegaly as on previous study  VISUALIZED ORBITS AND PARANASAL SINUSES:  Unremarkable  CALVARIUM AND EXTRACRANIAL SOFT TISSUES:  Normal      Impression: No acute intracranial abnormality  Chronic changes as detailed above Workstation performed: [id number]         ** Please Note: Dragon 360 Dictation voice to text software was used in the creation of this document   **

## 2019-12-24 NOTE — ASSESSMENT & PLAN NOTE
· Presenting with markedly elevated blood pressures; had not taken antihypertensives on the day of admission  · Continue patient's home antihypertensives  · PRN hydralazine

## 2019-12-24 NOTE — SOCIAL WORK
CM met with pt at bedside and also spoke to daughter Keiko Monroe via and Juni Guzman from [facility] via phone  Pt resides at [facility] and is assisted with showering, dressing, and medication administration  She has an indwelling Davis and uses O2 prn  She has dementia  She uses a walker to ambulate  She has used [facility] services in the past with [place] and [address] [place] [address]  She has been in [facility] and [place] in the past for STR  Denies substance abuse  She quit smoking in 2007  She has a hx of depression that is treated with medication by her PCP-Dr Andrew Sherman  She uses the house pharmacy and has no problem with co-pays  She has an Advanced Directive and her POA is daughter Keiko Monroe  She is transported to appointments by her daughter Keiko Monroe  Either Keiko Monroe will transport back to [facility] or transportation can be set up for the return  Juni Guzman informs CM that pt will be accepted back to [facility] when she is medically cleared  CM will continue to follow through hospitalization  CM reviewed discharge planning process including the following: identifying help at home, patient preference for discharge planning needs, pharmacy preference, and availability of treatment team to discuss questions or concerns patient and/or family may have regarding understanding medications and recognizing signs and symptoms once discharged  CM also encouraged patient to follow up with all recommended appointments after discharge  Patient advised of importance for patient and family to participate in managing patients medical well being

## 2019-12-24 NOTE — PLAN OF CARE
Problem: Potential for Falls  Goal: Patient will remain free of falls  Description  INTERVENTIONS:  - Assess patient frequently for physical needs  -  Identify cognitive and physical deficits and behaviors that affect risk of falls    -  [place] fall precautions as indicated by assessment   - Educate patient/family on patient safety including physical limitations  - Instruct patient to call for assistance with activity based on assessment  - Modify environment to reduce risk of injury  - Consider OT/PT consult to assist with strengthening/mobility  Outcome: Progressing

## 2019-12-24 NOTE — ASSESSMENT & PLAN NOTE
· Had presented with generalized weakness and apparent confusion  · UA with pyuria and bacteriuria with hematuria; has chronic indwelling baca  Acute cystitis due to chronic indwelling Baca catheter, as evidenced by UTI in a patient with a chronic Baca catheter, requiring IV antibiotics and changing of Baca catheter   · Received Levaquin in ED given h/o penicillin allergy with unclear reaction, transitioned to ceftriaxone, will continue for now pending results of urine culture  · Trend WBC, temperature curve

## 2019-12-24 NOTE — PLAN OF CARE
Problem: PHYSICAL THERAPY ADULT  Goal: Performs mobility at highest level of function for planned discharge setting  See evaluation for individualized goals  Description  Treatment/Interventions: Functional transfer training, LE strengthening/ROM, Elevations, Therapeutic exercise, Endurance training, Patient/family training, Equipment eval/education, Bed mobility, Gait training, Spoke to nursing, Family          See flowsheet documentation for full assessment, interventions and recommendations  Note:   Prognosis: Good  Problem List: Decreased strength, Decreased endurance, Impaired balance, Decreased mobility, Decreased cognition, Decreased safety awareness  Assessment: Pt is 69 y o  female seen for PT evaluation s/p admit to [facility] on 2019 w/ Acute cystitis with hematuria  PT consulted to assess pt's functional mobility and d/c needs  Order placed for PT eval and tx, w/ ambulate patient order  Performed at least 2 patient identifiers during session: Name and   Comorbidities affecting pt's physical performance at time of assessment include: acute metabolic encephalopathy, paroxysmal atrial fibrillation, stage 3 chronic kidney diease, hyperlipidemia, dementia, coronary artery disease, elevated troponin, hypertensive urgency  PTA, pt was independent w/ ambulation w/ walker, requires A for ADLs and IADLs, ambulates household distances, has 10 ERON, resident of [facility] and retired  Personal factors affecting pt at time of IE include: inaccessible home environment, ambulating w/ assistive device, stairs to enter home, inability to navigate level surfaces w/o external assistance, decreased cognition, inability to perform IADLs and inability to perform ADLs   Please find objective findings from PT assessment regarding body systems outlined above with impairments and limitations including weakness, impaired balance, decreased endurance, gait deviations, decreased activity tolerance, decreased functional mobility tolerance, decreased safety awareness, fall risk and decreased cognition  The following objective measures performed on IE also reveal limitations: Barthel Index: 45/100 and Modified [place]: 4 (moderate/severe disability)  Pt's clinical presentation is currently unstable/unpredictable seen in pt's presentation of ongoing medical management/monitoring, evident in need for assist w/ all phases of mobility when usually mobilizing independently, and fatigue impacting overall mobility status  Pt to benefit from continued PT tx to address deficits as defined above and maximize level of functional independent mobility and consistency  From PT/mobility standpoint, recommendation at time of d/c would be return to care home with home PT pending progress in order to facilitate return to PLOF  Barriers to Discharge: Inaccessible home environment  Barriers to Discharge Comments: 10 stairs to bed/bath  Recommendation: Home PT, Other (Comment)(return to care home with home PT)     PT - OK to Discharge: No    See flowsheet documentation for full assessment

## 2019-12-24 NOTE — ASSESSMENT & PLAN NOTE
Concern for acute metabolic encephalopathy in the setting of acute cystitis  Superimposed on baseline dementia  Head CT is negative

## 2019-12-24 NOTE — ASSESSMENT & PLAN NOTE
· Creatinine below baseline  · Monitor with BMP, avoid nephrotoxins as able and renally dose medications

## 2019-12-24 NOTE — ASSESSMENT & PLAN NOTE
· Had presented with generalized weakness and apparent confusion  · UA with pyuria and bacteriuria with hematuria; has chronic indwelling baca  · Received Levaquin in ED given h/o penicillin allergy with unclear reaction, will continue for now pending results of urine culture  · Trend WBC, temperature curve

## 2019-12-24 NOTE — ASSESSMENT & PLAN NOTE
· Currently at baseline per daughter, lives with her daughter Myah Jason  · Delirium precautions and fall precautions

## 2019-12-24 NOTE — ASSESSMENT & PLAN NOTE
· Presenting with markedly elevated blood pressures; had not taken antihypertensives today  · Improving with IV labetalol in ED  · Will restart home antihypertensives in a m , continue blood pressure monitoring per protocol

## 2019-12-24 NOTE — ED PROVIDER NOTES
History  Chief Complaint   Patient presents with    High Blood Pressure     Per EMS " Pt has been ahving high BP  SBP was 229  PT is asymptomatic  Hx of stroke "      Pt who resides at independent living with h/o prior intracranial aneurysm and R frontal stroke who presents to ED c/o "just not feeling right" with generalized weakness, unclear duration  Pt is a generally poor historian on account of prior stroke and confusion  She denies chest pain and back pain  She denies focal weakness  She denies HA  She has a h/o HTN, on meds for same, has not yet taken meds today  Additional history provided by daughter who reports pt is confused at baseline but more confused in the last few days  Prior to Admission Medications   Prescriptions Last Dose Informant Patient Reported? Taking?    Acetaminophen 325 MG CAPS  Outside Facility (Specify) Yes No   Sig: Take by mouth   Cholecalciferol (VITAMIN D) 2000 units CAPS  Outside Facility (Specify) No No   Sig: Take 1 capsule (2,000 Units total) by mouth daily   Citric Yt-Jxsduxfpfym-Zu Carb (RENACIDIN) SOLN  Outside Facility (Specify) Yes No   Sig: Indications: 60ml to irrigate baca once a week on Friday   LORazepam (ATIVAN) 0 5 mg tablet   No No   Sig: Take 1 tablet (0 5 mg total) by mouth 2 (two) times a day for 5 doses   Sennosides-Docusate Sodium (SENNA PLUS PO)  Outside Facility (Specify) Yes No   Sig: Take by mouth daily as needed   albuterol (PROVENTIL HFA,VENTOLIN HFA) 90 mcg/act inhaler  Outside Facility (Specify) No No   Sig: Inhale 2 puffs every 6 (six) hours as needed for wheezing   atorvastatin (LIPITOR) 10 mg tablet  Outside Facility (Specify) No No   Sig: Take 1 tablet (10 mg total) by mouth daily   betamethasone dipropionate (DIPROSONE) 0 05 % cream  Outside Facility (Specify) Yes No   Sig: Apply topically 3 (three) times a day   buPROPion (WELLBUTRIN SR) 150 mg 12 hr tablet  Outside Facility (Specify) No No   Sig: Take 1 tablet (150 mg total) by mouth daily   carBAMazepine (TEGretol) 100 mg chewable tablet  Outside Facility (Specify) No No   Sig: Chew 1 tablet (100 mg total) 3 (three) times a day   clopidogrel (PLAVIX) 75 mg tablet  Outside Facility (Specify) No No   Sig: Take 1 tablet (75 mg total) by mouth daily   clotrimazole (LOTRIMIN) 1 % cream  Outside Facility (Specify) Yes No   Sig: Apply topically 3 (three) times a day   digoxin (LANOXIN) 0 125 mg tablet  Outside Facility (Specify) Yes No   Sig: Take 125 mcg by mouth daily   divalproex sodium (DEPAKOTE ER) 250 mg 24 hr tablet  Outside Facility (Specify) No No   Sig: Take 1 tablet (250 mg total) by mouth 2 (two) times a day   fenofibrate micronized (LOFIBRA) 67 MG capsule  Outside Facility (Specify) No No   Sig: Take 1 capsule (67 mg total) by mouth daily with breakfast   ferrous sulfate 325 (65 Fe) mg tablet  Outside Facility (Specify) No No   Sig: Take 1 tablet (325 mg total) by mouth 2 (two) times a day   fluticasone-vilanterol (BREO ELLIPTA) 100-25 mcg/inh inhaler  Outside Facility (Specify) No No   Sig: Inhale 1 puff daily   furosemide (LASIX) 40 mg tablet  Outside Facility (Specify) No No   Sig: Take 1 tablet (40 mg total) by mouth daily   lactobacillus acidophilus  Outside Facility (Specify) Yes No   Sig: Take 1 capsule by mouth daily   levothyroxine 88 mcg tablet  Outside Facility (Specify) No No   Sig: Take 1 tablet (88 mcg total) by mouth daily   metoprolol tartrate (LOPRESSOR) 100 mg tablet  Outside Facility (Specify) No No   Sig: Take 1 tablet (100 mg total) by mouth every 12 (twelve) hours   nitroglycerin (NITROSTAT) 0 4 mg SL tablet  Outside Facility (Specify) No No   Sig: Place 1 tablet (0 4 mg total) under the tongue every 5 (five) minutes as needed for chest pain   potassium chloride (K-DUR,KLOR-CON) 20 mEq tablet  Outside Facility (Specify) No No   Sig: Take 1 tablet (20 mEq total) by mouth daily   saccharomyces boulardii (FLORASTOR) 250 mg capsule  Outside Facility (Specify) No No Sig: Take 1 capsule (250 mg total) by mouth 2 (two) times a day   warfarin (COUMADIN) 3 mg tablet  Outside Facility (Specify) No No   Sig: One tablet daily or as otherwise directed  Facility-Administered Medications: None       Past Medical History:   Diagnosis Date    Atrial fibrillation ([facility] )     Brain aneurysm     CAD (coronary artery disease)     Cardiac disease     had stents 12 years ago in [facility]    CKD (chronic kidney disease) stage 3, GFR 30-59 ml/min ([facility]) ckd    COPD [facility]chronic obstructive pulmonary disease) ([facility])     Dementia ([facility])     Diastolic CHF, chronic (HCC)     GERD (gastroesophageal reflux disease)     Hyperlipidemia     Hyperlipidemia     Hypertension     Hypothyroidism     Migraine     [facility]     Renal disorder     Seizures ([place] [id number] )     Stroke [facility])        Past Surgical History:   Procedure Laterality Date    APPENDECTOMY      ARTERIOGRAM N/A 2018    Procedure: ARTERIOGRAM WITH STENT PLACEMENT;  Surgeon: Howard Reddy MD;  [facility]: [facility];  Service: Vascular    BLADDER TUMOR EXCISION      BRAIN SURGERY        clip right frontal lobe    CARDIAC SURGERY      COLONOSCOPY      CORONARY STENT PLACEMENT      5 stents    EYE SURGERY      IR ABDOMINAL ANGIOGRAPHY / INTERVENTION  2018    MANDIBLE FRACTURE SURGERY      TONSILLECTOMY      TUBAL LIGATION      UTERINE FIBROID SURGERY         Family History   Problem Relation Age of Onset    Heart disease Father     Parkinsonism Father     Macular degeneration Mother     Glaucoma Mother     Diabetes Brother     Heart disease Brother      I have reviewed and agree with the history as documented      Social History     Tobacco Use    Smoking status: Former Smoker     Packs/day: 5 00     Years: 40 00     Pack years: 200 00     Last attempt to quit: 2007     Years since quittin 3    Smokeless tobacco: Never Used   Substance Use Topics    Alcohol use: Never     Alcohol/week: 0 0 standard drinks     Frequency: Never    Drug use: No        Review of Systems   Unable to perform ROS: Acuity of condition       Physical Exam  Physical Exam   Constitutional: She appears well-developed and well-nourished  No distress  HENT:   Head: Normocephalic and atraumatic  Eyes: Pupils are equal, round, and reactive to light  Conjunctivae and EOM are normal    Neck: Normal range of motion  Neck supple  No JVD present  Cardiovascular: Normal rate, regular rhythm, normal heart sounds and intact distal pulses  Pulmonary/Chest: Effort normal and breath sounds normal  No stridor  Abdominal: Soft  She exhibits no distension  There is no tenderness  There is no rebound and no guarding  Musculoskeletal: Normal range of motion  She exhibits no edema, tenderness or deformity  Neurological: She is alert  No cranial nerve deficit or sensory deficit  She exhibits normal muscle tone  Coordination normal    Disoriented to place and time  Skin: Skin is warm and dry  Capillary refill takes less than 2 seconds  No rash noted  She is not diaphoretic  No erythema  No pallor  Nursing note and vitals reviewed        Vital Signs  ED Triage Vitals [12/23/19 2126]   Temperature Pulse Respirations Blood Pressure SpO2   98 °F (36 7 °C) 67 20 (!) 226/99 (!) 87 %      Temp Source Heart Rate Source Patient Position - Orthostatic VS BP Location FiO2 (%)   Oral Monitor Lying Right arm --      Pain Score       No Pain           Vitals:    12/23/19 2126 12/23/19 2315 12/24/19 0015   BP: (!) 226/99 (!) 238/105 170/80   Pulse: 67 (!) 54 60   Patient Position - Orthostatic VS: Lying Sitting Sitting         Visual Acuity      ED Medications  Medications   levofloxacin (LEVAQUIN) IVPB (premix) 750 mg (750 mg Intravenous New Bag 12/24/19 0031)   Labetalol HCl (NORMODYNE) injection 10 mg (10 mg Intravenous Given 12/23/19 2340)   acetaminophen (TYLENOL) tablet 650 mg (650 mg Oral Given 12/24/19 0027) Diagnostic Studies  Results Reviewed     Procedure Component Value Units Date/Time    Influenza A/B and RSV PCR [[id number]]  (Normal) Collected:  12/23/19 2340    Lab Status:  Final result Specimen:  Nasopharyngeal from Nose Updated:  12/24/19 0029     INFLUENZA A PCR None Detected     INFLUENZA B PCR None Detected     RSV PCR None Detected    Urine Microscopic [[id number]]  (Abnormal) Collected:  12/23/19 2343    Lab Status:  Final result Specimen:  Urine, Clean Catch Updated:  12/24/19 0003     RBC, UA 10-20 /hpf      WBC, UA 20-30 /hpf      Epithelial Cells Occasional /hpf      Bacteria, UA Moderate /hpf     UA (URINE) with reflex to Scope [[id number]]  (Abnormal) Collected:  12/23/19 2343    Lab Status:  Final result Specimen:  Urine, Clean Catch Updated:  12/23/19 2352     Color, UA Yellow     Clarity, UA Cloudy     Specific [place], UA 1 020     pH, UA 8 0     Leukocytes, UA Moderate     Nitrite, UA Negative     Protein,  (2+) mg/dl      Glucose,  (1/10%) mg/dl      Ketones, UA Negative mg/dl      Urobilinogen, UA 0 2 E U /dl      Bilirubin, UA Negative     Blood, UA Large    Protime-INR [[id number]]  (Abnormal) Collected:  12/23/19 2234    Lab Status:  Final result Specimen:  Blood from Arm, Right Updated:  12/23/19 2305     Protime 28 1 seconds      INR 2 60    APTT [[id number]]  (Normal) Collected:  12/23/19 2234    Lab Status:  Final result Specimen:  Blood from Arm, Right Updated:  12/23/19 2305     PTT 37 seconds     Troponin I [[id number]]  (Abnormal) Collected:  12/23/19 2234    Lab Status:  Final result Specimen:  Blood from Arm, Right Updated:  12/23/19 2302     Troponin I 0 05 ng/mL     Lactic acid, plasma x2 [[id number]]  (Normal) Collected:  12/23/19 2234    Lab Status:  Final result Specimen:  Blood from Arm, Right Updated:  12/23/19 2302     LACTIC ACID 1 5 mmol/L     Narrative:       Result may be elevated if tourniquet was used during collection      Basic metabolic panel [[id number]]  (Abnormal) Collected:  12/23/19 2234    Lab Status:  Final result Specimen:  Blood from Arm, Right Updated:  12/23/19 2300     Sodium 146 mmol/L      Potassium 3 7 mmol/L      Chloride 107 mmol/L      CO2 33 mmol/L      ANION GAP 6 mmol/L      BUN 24 mg/dL      Creatinine 1 39 mg/dL      Glucose 200 mg/dL      Calcium 9 3 mg/dL      eGFR 38 ml/min/1 73sq m     Narrative:       [place] guidelines for Chronic Kidney Disease (CKD):     Stage 1 with normal or high GFR (GFR > 90 mL/min/1 73 square meters)    Stage 2 Mild CKD (GFR = 60-89 mL/min/1 73 square meters)    Stage 3A Moderate CKD (GFR = 45-59 mL/min/1 73 square meters)    Stage 3B Moderate CKD (GFR = 30-44 mL/min/1 73 square meters)    Stage 4 Severe CKD (GFR = 15-29 mL/min/1 73 square meters)    Stage 5 End Stage CKD (GFR <15 mL/min/1 73 square meters)  Note: GFR calculation is accurate only with a steady state creatinine    Hepatic function panel [[id number]]  (Abnormal) Collected:  12/23/19 2234    Lab Status:  Final result Specimen:  Blood from Arm, Right Updated:  12/23/19 2300     Total Bilirubin 0 30 mg/dL      Bilirubin, Direct 0 16 mg/dL      Alkaline Phosphatase 67 U/L      AST 18 U/L      ALT 25 U/L      Total Protein 7 2 g/dL      Albumin 3 3 g/dL     Blood culture #2 [[id number]] Collected:  12/23/19 2255    Lab Status:   In process Specimen:  Blood from Arm, Left Updated:  12/23/19 2257    CBC and differential [[id number]]  (Abnormal) Collected:  12/23/19 2234    Lab Status:  Final result Specimen:  Blood from Arm, Right Updated:  12/23/19 2244     WBC 6 03 Thousand/uL      RBC 3 67 Million/uL      Hemoglobin 12 2 g/dL      Hematocrit 37 9 %       fL      MCH 33 2 pg      MCHC 32 2 g/dL      RDW 14 3 %      MPV 11 9 fL      Platelets 411 Thousands/uL      nRBC 0 /100 WBCs      Neutrophils Relative 61 %      Immat GRANS % 2 %      Lymphocytes Relative 25 %      Monocytes Relative 8 %      Eosinophils Relative 3 %      Basophils Relative 1 %      Neutrophils Absolute 3 75 Thousands/µL      Immature Grans Absolute 0 11 Thousand/uL      Lymphocytes Absolute 1 50 Thousands/µL      Monocytes Absolute 0 46 Thousand/µL      Eosinophils Absolute 0 18 Thousand/µL      Basophils Absolute 0 03 Thousands/µL     Blood culture #1 [[id number]] Collected:  12/23/19 2234    Lab Status: In process Specimen:  Blood from Arm, Right Updated:  12/23/19 2241                 CT head without contrast   Final Result by Laura Cuevas MD (12/23 2238)      No acute intracranial abnormality  Chronic changes as detailed above                  Workstation performed: [id number]         XR chest 2 views    (Results Pending)              Procedures  ECG 12 Lead Documentation Only  Date/Time: 12/23/2019 10:24 PM  Performed by: Twan Sy MD  Authorized by: Twan Sy MD     Indications / Diagnosis:  Confusion  ECG reviewed by me, the ED Provider: yes    Patient location:  ED  Previous ECG:     Previous ECG:  Compared to current    Comparison ECG info:  28 oct 2019    Similarity:  Changes noted  Interpretation:     Interpretation: abnormal    Rate:     ECG rate:  59    ECG rate assessment: bradycardic    Rhythm:     Rhythm: sinus rhythm    Comments:      Nonspecific ST changes  Lateral ST depression  SR has replaced Aflutter  ED Course                               MDM  Number of Diagnoses or Management Options  Confusion:   History of stroke:   Hypertension:   UTI (urinary tract infection):   Diagnosis management comments: Acute on chronic confusion  UTI  Head ct without acute changes  Admit, abx          Amount and/or Complexity of Data Reviewed  Clinical lab tests: reviewed and ordered  Tests in the radiology section of CPT®: reviewed and ordered  Tests in the medicine section of CPT®: ordered and reviewed  Review and summarize past medical records: yes  Discuss the patient with other providers: yes  Independent visualization of images, tracings, or specimens: yes          Disposition  Final diagnoses:   Confusion   UTI (urinary tract infection)   Hypertension   History of stroke     Time reflects when diagnosis was documented in both MDM as applicable and the Disposition within this note     Time User Action Codes Description Comment    12/24/2019  1:06 AM Roach, Trev Add [R41 0] Confusion     12/24/2019  1:06 AM Roach, Assbar[place] Lindsey Add [N39 0] UTI (urinary tract infection)     12/24/2019  1:07 AM Roach, Trev Add [I10] Hypertension     12/24/2019  1:07 AM Clearance [place] Add [Z86 73] History of stroke       ED Disposition     ED Disposition Condition Date/Time Comment    Admit Stable Tue Dec 24, 2019  1:06 AM Case was discussed with Dr Shana Almodovar and the patient's admission status was agreed to be Admission Status: inpatient status to the service of Dr Shana Almodovar   Follow-up Information    None         Patient's Medications   Discharge Prescriptions    No medications on file     No discharge procedures on file      ED Provider  Electronically Signed by           Sue Ro MD  12/24/19 9376

## 2019-12-24 NOTE — ASSESSMENT & PLAN NOTE
· Slightly elevated to 0 05/0 11/0 08 , patient denies any chest pain/pressure or anginal symptoms however somewhat limited by dementia  · Likely in the setting of hypertensive urgency  · EKG without ischemic change  · telemetry monitoring

## 2019-12-24 NOTE — PROGRESS NOTES
Post admission check - Trini Organ 1945, 69 y o  female MRN: [id number]    Unit/Bed#:  Encounter: [id number]    Primary Care Provider: Cande De La O DO   Date and time admitted to hospital: 12/23/2019  9:18 PM        Acute metabolic encephalopathy  Assessment & Plan  Concern for acute metabolic encephalopathy in the setting of acute cystitis  Superimposed on baseline dementia  Head CT is negative    Paroxysmal atrial fibrillation (HCC)  Assessment & Plan  · Continue metoprolol and digoxin  · Anticoagulated on warfarin, check INR daily    Coronary artery disease involving native coronary artery of native heart without angina pectoris  Assessment & Plan  · Continue Plavix, beta-blocker, statin    Stage 3 chronic kidney disease (HCC)  Assessment & Plan  · Creatinine below baseline  · Monitor with BMP, avoid nephrotoxins as able and renally dose medications    Hyperlipidemia  Assessment & Plan  · Continue statin therapy    Dementia ([facility] )  Assessment & Plan  · Currently at baseline per daughter, lives with her daughter Render Ser  · Delirium precautions and fall precautions    Elevated troponin  Assessment & Plan  · Slightly elevated to 0 05/0 11/0 08 , patient denies any chest pain/pressure or anginal symptoms however somewhat limited by dementia  · Likely in the setting of hypertensive urgency  · EKG without ischemic change  · telemetry monitoring    Hypertensive urgency  Assessment & Plan  · Presenting with markedly elevated blood pressures; had not taken antihypertensives on the day of admission  · Continue patient's home antihypertensives  · PRN hydralazine    * Acute cystitis with hematuria  Assessment & Plan  · Had presented with generalized weakness and apparent confusion  · UA with pyuria and bacteriuria with hematuria; has chronic indwelling baca  Acute cystitis due to chronic indwelling Baca catheter, as evidenced by UTI in a patient with a chronic Baca catheter, requiring IV antibiotics and changing of Davis catheter   · Received Levaquin in ED given h/o penicillin allergy with unclear reaction, transitioned to ceftriaxone, will continue for now pending results of urine culture  · Trend WBC, temperature curve    Patient is resting in the chair  She is a little bradycardic in the setting of receiving 2 doses of metoprolol relatively close to each other however she is asymptomatic, will continue to monitor on telemetry  She appears well  Will add IV hydralazine to attempt to get blood pressure better controlled  Instructed nursing to collected blood pressure manually, elevations may be in the setting of machine dysfunction  She is alert, she is oriented to self    She denies any complaints, including headaches, chest pain chest tightness shortness of breath however she is not a good historian

## 2019-12-24 NOTE — ASSESSMENT & PLAN NOTE
· Slightly elevated to 0 05 on admission, patient denies any chest pain/pressure or anginal symptoms however somewhat limited by dementia  · EKG without ischemic change  · Continue to trend troponin, telemetry monitoring

## 2019-12-25 LAB
ANION GAP SERPL CALCULATED.3IONS-SCNC: 7 MMOL/L (ref 4–13)
ATRIAL RATE: 59 BPM
BUN SERPL-MCNC: 22 MG/DL (ref 5–25)
CALCIUM SERPL-MCNC: 9.2 MG/DL (ref 8.3–10.1)
CHLORIDE SERPL-SCNC: 105 MMOL/L (ref 100–108)
CO2 SERPL-SCNC: 30 MMOL/L (ref 21–32)
CREAT SERPL-MCNC: 1.39 MG/DL (ref 0.6–1.3)
ERYTHROCYTE [DISTWIDTH] IN BLOOD BY AUTOMATED COUNT: 14.6 % (ref 11.6–15.1)
GFR SERPL CREATININE-BSD FRML MDRD: 38 ML/MIN/1.73SQ M
GLUCOSE SERPL-MCNC: 150 MG/DL (ref 65–140)
HCT VFR BLD AUTO: 36.2 % (ref 34.8–46.1)
HGB BLD-MCNC: 11.6 G/DL (ref 11.5–15.4)
INR PPP: 2.54 (ref 0.84–1.19)
MCH RBC QN AUTO: 32.7 PG (ref 26.8–34.3)
MCHC RBC AUTO-ENTMCNC: 32 G/DL (ref 31.4–37.4)
MCV RBC AUTO: 102 FL (ref 82–98)
P AXIS: 81 DEGREES
PLATELET # BLD AUTO: 138 THOUSANDS/UL (ref 149–390)
PMV BLD AUTO: 11.5 FL (ref 8.9–12.7)
POTASSIUM SERPL-SCNC: 3.7 MMOL/L (ref 3.5–5.3)
PR INTERVAL: 196 MS
PROTHROMBIN TIME: 27.7 SECONDS (ref 11.6–14.5)
QRS AXIS: 33 DEGREES
QRSD INTERVAL: 108 MS
QT INTERVAL: 402 MS
QTC INTERVAL: 397 MS
RBC # BLD AUTO: 3.55 MILLION/UL (ref 3.81–5.12)
SODIUM SERPL-SCNC: 142 MMOL/L (ref 136–145)
T WAVE AXIS: 123 DEGREES
VENTRICULAR RATE: 59 BPM
WBC # BLD AUTO: 6.86 THOUSAND/UL (ref 4.31–10.16)

## 2019-12-25 PROCEDURE — 97116 GAIT TRAINING THERAPY: CPT

## 2019-12-25 PROCEDURE — 80048 BASIC METABOLIC PNL TOTAL CA: CPT | Performed by: NURSE PRACTITIONER

## 2019-12-25 PROCEDURE — 85610 PROTHROMBIN TIME: CPT | Performed by: NURSE PRACTITIONER

## 2019-12-25 PROCEDURE — 93010 ELECTROCARDIOGRAM REPORT: CPT | Performed by: INTERNAL MEDICINE

## 2019-12-25 PROCEDURE — 99232 SBSQ HOSP IP/OBS MODERATE 35: CPT | Performed by: NURSE PRACTITIONER

## 2019-12-25 PROCEDURE — 85027 COMPLETE CBC AUTOMATED: CPT | Performed by: NURSE PRACTITIONER

## 2019-12-25 RX ORDER — ACETAMINOPHEN 325 MG/1
650 TABLET ORAL EVERY 4 HOURS PRN
Status: DISCONTINUED | OUTPATIENT
Start: 2019-12-25 | End: 2019-12-26 | Stop reason: HOSPADM

## 2019-12-25 RX ADMIN — FUROSEMIDE 40 MG: 40 TABLET ORAL at 07:35

## 2019-12-25 RX ADMIN — LEVOTHYROXINE SODIUM 88 MCG: 88 TABLET ORAL at 07:36

## 2019-12-25 RX ADMIN — FLUTICASONE FUROATE AND VILANTEROL TRIFENATATE 1 PUFF: 100; 25 POWDER RESPIRATORY (INHALATION) at 07:47

## 2019-12-25 RX ADMIN — Medication 250 MG: at 07:39

## 2019-12-25 RX ADMIN — ATORVASTATIN CALCIUM 10 MG: 10 TABLET, FILM COATED ORAL at 16:21

## 2019-12-25 RX ADMIN — BUPROPION HYDROCHLORIDE 150 MG: 150 TABLET, FILM COATED, EXTENDED RELEASE ORAL at 07:42

## 2019-12-25 RX ADMIN — FERROUS SULFATE TAB 325 MG (65 MG ELEMENTAL FE) 325 MG: 325 (65 FE) TAB at 07:35

## 2019-12-25 RX ADMIN — METOPROLOL TARTRATE 100 MG: 50 TABLET, FILM COATED ORAL at 22:47

## 2019-12-25 RX ADMIN — Medication 250 MG: at 17:39

## 2019-12-25 RX ADMIN — FERROUS SULFATE TAB 325 MG (65 MG ELEMENTAL FE) 325 MG: 325 (65 FE) TAB at 17:39

## 2019-12-25 RX ADMIN — DIVALPROEX SODIUM 250 MG: 250 TABLET, FILM COATED, EXTENDED RELEASE ORAL at 07:43

## 2019-12-25 RX ADMIN — CLOPIDOGREL BISULFATE 75 MG: 75 TABLET ORAL at 07:32

## 2019-12-25 RX ADMIN — CARBAMAZEPINE 100 MG: 100 TABLET, CHEWABLE ORAL at 07:44

## 2019-12-25 RX ADMIN — CARBAMAZEPINE 100 MG: 100 TABLET, CHEWABLE ORAL at 16:20

## 2019-12-25 RX ADMIN — METOPROLOL TARTRATE 100 MG: 50 TABLET, FILM COATED ORAL at 07:40

## 2019-12-25 RX ADMIN — ACETAMINOPHEN 650 MG: 325 TABLET, FILM COATED ORAL at 18:53

## 2019-12-25 RX ADMIN — VITAMIN D, TAB 1000IU (100/BT) 2000 UNITS: 25 TAB at 07:31

## 2019-12-25 RX ADMIN — DIVALPROEX SODIUM 250 MG: 250 TABLET, FILM COATED, EXTENDED RELEASE ORAL at 18:53

## 2019-12-25 RX ADMIN — DIGOXIN 125 MCG: 125 TABLET ORAL at 07:38

## 2019-12-25 RX ADMIN — CARBAMAZEPINE 100 MG: 100 TABLET, CHEWABLE ORAL at 22:48

## 2019-12-25 RX ADMIN — WARFARIN SODIUM 3 MG: 3 TABLET ORAL at 17:39

## 2019-12-25 RX ADMIN — CEFTRIAXONE SODIUM 1000 MG: 10 INJECTION, POWDER, FOR SOLUTION INTRAVENOUS at 13:18

## 2019-12-25 NOTE — PROGRESS NOTES
[facility] Internal Medicine  Progress Note Jia Robles [birth year], 69 y o  female MRN: [id number]    Unit/Bed#:  Encounter: [id number]    Primary Care Provider: Yoandy Nelson DO   Date and time admitted to hospital: 12/23/2019  9:18 PM    Acute metabolic encephalopathy  Assessment & Plan  Concern for acute metabolic encephalopathy in the setting of acute cystitis  Superimposed on baseline dementia  Head CT is negative    Paroxysmal atrial fibrillation (HCC)  Assessment & Plan  · Continue metoprolol and digoxin  · Anticoagulated on warfarin, check INR daily    Coronary artery disease involving native coronary artery of native heart without angina pectoris  Assessment & Plan  · Continue Plavix, beta-blocker, statin    Stage 3 chronic kidney disease (HCC)  Assessment & Plan  · Creatinine below baseline  · Monitor with BMP, avoid nephrotoxins as able and renally dose medications    Hyperlipidemia  Assessment & Plan  · Continue statin therapy    Dementia ([facility] [address] )  Assessment & Plan  · Currently at baseline per daughter, lives with her daughter Jaswinder Moreira  · Delirium precautions and fall precautions  · Evaluated by Physical therapy there recommending short-term rehab  · Case management to follow    Elevated troponin  Assessment & Plan  · Slightly elevated to 0 05/0 11/0 08 , patient denies any chest pain/pressure or anginal symptoms however somewhat limited by dementia  · Likely in the setting of hypertensive urgency  · EKG without ischemic change  · telemetry monitoring    Hypertensive urgency  Assessment & Plan  · Presenting with markedly elevated blood pressures; had not taken antihypertensives on the day of admission  · Continue patient's home antihypertensives  · PRN hydralazine    * Acute cystitis with hematuria  Assessment & Plan  · Had presented with generalized weakness and apparent confusion  · UA with pyuria and bacteriuria with hematuria; has chronic indwelling baca  Acute cystitis due to chronic indwelling Davis catheter, as evidenced by UTI in a patient with a chronic Davis catheter, requiring IV antibiotics and changing of Davis catheter   · Received Levaquin in ED given h/o penicillin allergy with unclear reaction, transitioned to ceftriaxone, will continue for now pending results of urine culture  · Blood culture negative times 24 hours  · Trend WBC, temperature curve       VTE Pharmacologic Prophylaxis:   Pharmacologic: Warfarin (Coumadin)  Mechanical VTE Prophylaxis in Place: Yes    Patient Centered Rounds: I have performed bedside rounds with nursing staff today  Discussions with Specialists or Other Care Team Provider:  Reviewed notes discussed with primary RN    Education and Discussions with Family / Patient:  Educated patient  Called daughter and left a voicemail message  Time Spent for Care: 20 minutes  More than 50% of total time spent on counseling and coordination of care as described above  Current Length of Stay: 1 day(s)    Current Patient Status: Inpatient   Certification Statement: The patient will continue to require additional inpatient hospital stay due to IV antibiotics awaiting urine culture    Discharge Plan / Estimated Discharge Date:  24-48 hours pending rehab placement and urine culture      Code Status: Level 1 - Full Code      Subjective:   Denies any chest pain chest tightness shortness of breath or difficulty breathing  Offers no complaints  Patient is pleasantly confused appears to be resting comfortably in chair    Objective:     Vitals:   Temp (24hrs), Av 6 °F (37 °C), Min:98 3 °F (36 8 °C), Max:99 1 °F (37 3 °C)    Temp:  [98 3 °F (36 8 °C)-99 1 °F (37 3 °C)] 98 4 °F (36 9 °C)  HR:  [53-66] 64  Resp:  [17-24] 18  BP: (122-230)/(58-98) 133/63  SpO2:  [94 %-96 %] 96 %  Body mass index is 32 73 kg/m²  Input and Output Summary (last 24 hours):        Intake/Output Summary (Last 24 hours) at 2019 1310  Last data filed at 2019 0830  Gross per 24 hour   Intake 100 ml   Output 1475 ml   Net -1375 ml       Physical Exam:     Physical Exam   Constitutional: She is oriented to person, place, and time  She appears well-developed and well-nourished  No distress  Eyes: Pupils are equal, round, and reactive to light  Neck: Normal range of motion  Cardiovascular: Normal rate  Pulmonary/Chest: Effort normal    Abdominal: Soft  Musculoskeletal: Normal range of motion  Neurological: She is alert and oriented to person, place, and time  Skin: Skin is warm and dry  Psychiatric: She has a normal mood and affect  Her behavior is normal  Judgment and thought content normal    Nursing note and vitals reviewed  Additional Data:     Labs:    Results from last 7 days   Lab Units 12/25/19 0443  12/23/19  2234   WBC Thousand/uL 6 86   < > 6 03   HEMOGLOBIN g/dL 11 6   < > 12 2   HEMATOCRIT % 36 2   < > 37 9   PLATELETS Thousands/uL 138*   < > 137*   NEUTROS PCT %  --   --  61   LYMPHS PCT %  --   --  25   MONOS PCT %  --   --  8   EOS PCT %  --   --  3    < > = values in this interval not displayed  Results from last 7 days   Lab Units 12/25/19 0443 12/23/19 2234   POTASSIUM mmol/L 3 7   < > 3 7   CHLORIDE mmol/L 105   < > 107   CO2 mmol/L 30   < > 33*   BUN mg/dL 22   < > 24   CREATININE mg/dL 1 39*   < > 1 39*   CALCIUM mg/dL 9 2   < > 9 3   ALK PHOS U/L  --   --  67   ALT U/L  --   --  25   AST U/L  --   --  18    < > = values in this interval not displayed  Results from last 7 days   Lab Units 12/25/19  0508   INR  2 54*       * I Have Reviewed All Lab Data Listed Above  * Additional Pertinent Lab Tests Reviewed: Jer[address] 67 Admission Reviewed  Recent Cultures (last 7 days):     Results from last 7 days   Lab Units 12/23/19 2255 12/23/19 2234   BLOOD CULTURE  No Growth at 24 hrs  No Growth at 24 hrs         Last 24 Hours Medication List:     Current Facility-Administered Medications:  albuterol 2 puff Inhalation Q6H PRN Moris Crouch Angie, DO    atorvastatin 10 mg Oral Daily Cheryln Folk, DO    buPROPion 150 mg Oral Daily Cheryln Folk, DO    carBAMazepine 100 mg Oral TID [facility]    cefTRIAXone 1,000 mg Intravenous Q24H [facility] TANVI Peña Last Rate: 1,000 mg (12/24/19 1608)   cholecalciferol 2,000 Units Oral Daily Cheryln Folk, DO    clopidogrel 75 mg Oral Daily Cheryln Folk, DO    digoxin 125 mcg Oral Daily Cheryln Folk, DO    divalproex sodium 250 mg Oral BID Cheryln Folk, DO    ferrous sulfate 325 mg Oral BID Cheryln Folk, DO    fluticasone-vilanterol 1 puff Inhalation Daily Cheryln Folk, DO    furosemide 40 mg Oral Daily Cheryln Folk, DO    hydrALAZINE 5 mg Intravenous Q6H PRN [facility]    levothyroxine 88 mcg Oral Daily Cheryln Folk, DO    metoprolol tartrate 100 mg Oral Q12H [address] Cheryln Folk, DO    nitroglycerin 0 4 mg Sublingual Q5 Min PRN Cheryln Folk, DO    saccharomyces boulardii 250 mg Oral BID Cheryln Folk, DO    senna-docusate sodium 1 tablet Oral Daily PRN Cheryln Folk, DO    warfarin 3 mg Oral Daily (warfarin) Cherylhuy Folk, DO         Today, Patient [facility]en [facility]    ** Please Note: Dragon 360 Dictation voice to text software may have been used in the creation of this document   **

## 2019-12-25 NOTE — PLAN OF CARE
Problem: PHYSICAL THERAPY ADULT  Goal: Performs mobility at highest level of function for planned discharge setting  See evaluation for individualized goals  Description  Treatment/Interventions: Functional transfer training, LE strengthening/ROM, Elevations, Therapeutic exercise, Endurance training, Patient/family training, Equipment eval/education, Bed mobility, Gait training, Spoke to nursing, Family          See flowsheet documentation for full assessment, interventions and recommendations  Outcome: Progressing  Note:   Prognosis: Good  Problem List: Decreased strength, Decreased endurance, Impaired balance, Decreased mobility, Decreased cognition, Decreased safety awareness  Assessment: pt OOB in chair upon arrival  agreeable to PT session  pt c increased fatigue this session compared to PT eval  completed mobility tasks min (A)x1 c increased time  progressed ambulation distance to 25'x2 c RW c 3 standing rest breaks  completed seated AROM LAQ + marches x10 reps c min verbal cues for technique  frequent cues required c mobility tasks for proper technique + sequencing  remained OOB in chair at end of session c chair alarm activated  will cont skilled PT to further maximize functional mobility + improve quality of life  upon d/c, recommend STR if snf unable to accept pt c hhpt at currently level of mobility  Barriers to Discharge: Inaccessible home environment  Barriers to Discharge Comments: 10 stairs to bed/bath  Recommendation: Short-term skilled PT, Home PT(STR if snf unable to accept pt at current status)     PT - OK to Discharge: No    See flowsheet documentation for full assessment

## 2019-12-25 NOTE — ASSESSMENT & PLAN NOTE
· Currently at baseline per daughter, lives with her daughter Emilie Grief  · Delirium precautions and fall precautions  · Evaluated by Physical therapy there recommending short-term rehab  · Case management to follow

## 2019-12-25 NOTE — PLAN OF CARE
Problem: Potential for Falls  Goal: Patient will remain free of falls  Description  INTERVENTIONS:  - Assess patient frequently for physical needs  -  Identify cognitive and physical deficits and behaviors that affect risk of falls    -  [place] fall precautions as indicated by assessment   - Educate patient/family on patient safety including physical limitations  - Instruct patient to call for assistance with activity based on assessment  - Modify environment to reduce risk of injury  - Consider OT/PT consult to assist with strengthening/mobility  Outcome: Progressing     Problem: Prexisting or High Potential for Compromised Skin Integrity  Goal: Skin integrity is maintained or improved  Description  INTERVENTIONS:  - Identify patients at risk for skin breakdown  - Assess and monitor skin integrity  - Assess and monitor nutrition and hydration status  - Monitor labs   - Assess for incontinence   - Turn and reposition patient  - Assist with mobility/ambulation  - Relieve pressure over bony prominences  - Avoid friction and shearing  - Provide appropriate hygiene as needed including keeping skin clean and dry  - Evaluate need for skin moisturizer/barrier cream  - Collaborate with interdisciplinary team   - Patient/family teaching  - Consider wound care consult   Outcome: Progressing

## 2019-12-25 NOTE — PHYSICAL THERAPY NOTE
PT Progress Note (16min)  (11:20-11:36)       12/25/19 1136   Pain Assessment   Pain Assessment No/denies pain   Restrictions/Precautions   Weight Bearing Precautions Per Order No   Other Precautions Chair Alarm; Bed Alarm;Cognitive;Telemetry; Fall Risk   General   Chart Reviewed Yes   Response to Previous Treatment Patient with no complaints from previous session  Family/Caregiver Present No   Cognition   Orientation Level Oriented to person   Subjective   Subjective pt OOB in chair  agreeable to PT  "I am tired today"  Bed Mobility   Supine to Sit Unable to assess  (pt OOB in chair upon arrival)   Transfers   Sit to Stand 4  Minimal assistance   Additional items Assist x 1; Armrests; Verbal cues; Increased time required   Stand to Sit 4  Minimal assistance   Additional items Assist x 1; Armrests; Verbal cues; Increased time required   Ambulation/Elevation   Gait pattern Narrow JENNYFER; Decreased foot clearance; Excessively slow; Short stride   Gait Assistance 4  Minimal assist   Additional items Assist x 1;Verbal cues   Assistive Device Rolling walker   Distance 25'x2 c standing rests 2* fatigue   Stair Management Assistance Not tested   Balance   Static Sitting Good   Dynamic Sitting Fair +   Static Standing Fair   Dynamic Standing Fair -   Ambulatory Poor +   Endurance Deficit   Endurance Deficit Yes   Activity Tolerance   Activity Tolerance Patient limited by fatigue   Nurse Made Aware TARIQ Thomason aware pt ambulated (A)x1 c RW  pt c increased fatigue this session  Exercises   Knee AROM Long Arc Quad Sitting;10 reps;AROM; Bilateral   Marching Sitting;10 reps;AROM; Bilateral   Assessment   Prognosis Good   Problem List Decreased strength;Decreased endurance; Impaired balance;Decreased mobility; Decreased cognition;Decreased safety awareness   Assessment pt OOB in chair upon arrival  agreeable to PT session  pt c increased fatigue this session compared to PT eval  completed mobility tasks min (A)x1 c increased time   progressed ambulation distance to 25'x2 c RW c 3 standing rest breaks  completed seated AROM LAQ + marches x10 reps c min verbal cues for technique  frequent cues required c mobility tasks for proper technique + sequencing  remained OOB in chair at end of session c chair alarm activated  will cont skilled PT to further maximize functional mobility + improve quality of life  upon d/c, recommend STR if nursing home unable to accept pt c hhpt at currently level of mobility  Barriers to Discharge Inaccessible home environment   Goals   Patient Goals "to go back to [place]"  [facility] Expiration Date 01/03/20   PT Treatment Day 1   Plan   Treatment/Interventions Functional transfer training;LE strengthening/ROM; Elevations; Therapeutic exercise; Endurance training;Patient/family training;Equipment eval/education; Bed mobility;Gait training;Spoke to nursing   Progress Progressing toward goals   PT Frequency 5x/wk   Recommendation   Recommendation Short-term skilled PT; Home PT  (STR if nursing home unable to accept pt at current status)   PT - OK to Discharge No     Sheldon Goodson, PT, DPT

## 2019-12-25 NOTE — ASSESSMENT & PLAN NOTE
· Had presented with generalized weakness and apparent confusion  · UA with pyuria and bacteriuria with hematuria; has chronic indwelling baca  Acute cystitis due to chronic indwelling Baca catheter, as evidenced by UTI in a patient with a chronic Baca catheter, requiring IV antibiotics and changing of Baca catheter   · Received Levaquin in ED given h/o penicillin allergy with unclear reaction, transitioned to ceftriaxone, will continue for now pending results of urine culture  · Blood culture negative times 24 hours  · Trend WBC, temperature curve

## 2019-12-25 NOTE — PROGRESS NOTES
BP now 142/58 manually, pt continues to deny any issues at this time  Will continue to monitor    Mary Anne Palumbo RN

## 2019-12-25 NOTE — PROGRESS NOTES
/98 noted manually, patient denies any issues at this time, PRN hydralazine administered as ordered, SLIM notified and made aware  Will continue to monitor    Connie Rasmussen RN

## 2019-12-26 VITALS
HEART RATE: 55 BPM | OXYGEN SATURATION: 90 % | RESPIRATION RATE: 17 BRPM | DIASTOLIC BLOOD PRESSURE: 70 MMHG | SYSTOLIC BLOOD PRESSURE: 165 MMHG | TEMPERATURE: 98.4 F | WEIGHT: 186.73 LBS | HEIGHT: 63 IN | BODY MASS INDEX: 33.09 KG/M2

## 2019-12-26 PROBLEM — N30.01 ACUTE CYSTITIS WITH HEMATURIA: Status: RESOLVED | Noted: 2017-12-10 | Resolved: 2019-12-26

## 2019-12-26 PROBLEM — I16.0 HYPERTENSIVE URGENCY: Status: RESOLVED | Noted: 2017-12-10 | Resolved: 2019-12-26

## 2019-12-26 PROBLEM — G93.41 ACUTE METABOLIC ENCEPHALOPATHY: Status: RESOLVED | Noted: 2019-12-24 | Resolved: 2019-12-26

## 2019-12-26 LAB
ANION GAP SERPL CALCULATED.3IONS-SCNC: 10 MMOL/L (ref 4–13)
BACTERIA UR CULT: ABNORMAL
BUN SERPL-MCNC: 38 MG/DL (ref 5–25)
CALCIUM SERPL-MCNC: 9.2 MG/DL (ref 8.3–10.1)
CHLORIDE SERPL-SCNC: 105 MMOL/L (ref 100–108)
CO2 SERPL-SCNC: 29 MMOL/L (ref 21–32)
CREAT SERPL-MCNC: 1.8 MG/DL (ref 0.6–1.3)
ERYTHROCYTE [DISTWIDTH] IN BLOOD BY AUTOMATED COUNT: 14.7 % (ref 11.6–15.1)
GFR SERPL CREATININE-BSD FRML MDRD: 28 ML/MIN/1.73SQ M
GLUCOSE SERPL-MCNC: 132 MG/DL (ref 65–140)
HCT VFR BLD AUTO: 36.8 % (ref 34.8–46.1)
HGB BLD-MCNC: 11.8 G/DL (ref 11.5–15.4)
INR PPP: 3.13 (ref 0.84–1.19)
MCH RBC QN AUTO: 33.3 PG (ref 26.8–34.3)
MCHC RBC AUTO-ENTMCNC: 32.1 G/DL (ref 31.4–37.4)
MCV RBC AUTO: 104 FL (ref 82–98)
PLATELET # BLD AUTO: 138 THOUSANDS/UL (ref 149–390)
PMV BLD AUTO: 12.3 FL (ref 8.9–12.7)
POTASSIUM SERPL-SCNC: 3.8 MMOL/L (ref 3.5–5.3)
PROTHROMBIN TIME: 32.7 SECONDS (ref 11.6–14.5)
RBC # BLD AUTO: 3.54 MILLION/UL (ref 3.81–5.12)
SODIUM SERPL-SCNC: 144 MMOL/L (ref 136–145)
WBC # BLD AUTO: 5.58 THOUSAND/UL (ref 4.31–10.16)

## 2019-12-26 PROCEDURE — 85610 PROTHROMBIN TIME: CPT | Performed by: INTERNAL MEDICINE

## 2019-12-26 PROCEDURE — 99239 HOSP IP/OBS DSCHRG MGMT >30: CPT | Performed by: PHYSICIAN ASSISTANT

## 2019-12-26 PROCEDURE — 80048 BASIC METABOLIC PNL TOTAL CA: CPT | Performed by: INTERNAL MEDICINE

## 2019-12-26 PROCEDURE — 85027 COMPLETE CBC AUTOMATED: CPT | Performed by: INTERNAL MEDICINE

## 2019-12-26 RX ADMIN — LEVOTHYROXINE SODIUM 88 MCG: 88 TABLET ORAL at 06:20

## 2019-12-26 RX ADMIN — CARBAMAZEPINE 100 MG: 100 TABLET, CHEWABLE ORAL at 09:45

## 2019-12-26 RX ADMIN — Medication 250 MG: at 09:45

## 2019-12-26 RX ADMIN — DIVALPROEX SODIUM 250 MG: 250 TABLET, FILM COATED, EXTENDED RELEASE ORAL at 09:45

## 2019-12-26 RX ADMIN — FLUTICASONE FUROATE AND VILANTEROL TRIFENATATE 1 PUFF: 100; 25 POWDER RESPIRATORY (INHALATION) at 09:45

## 2019-12-26 RX ADMIN — FUROSEMIDE 40 MG: 40 TABLET ORAL at 09:45

## 2019-12-26 RX ADMIN — METOPROLOL TARTRATE 100 MG: 50 TABLET, FILM COATED ORAL at 09:45

## 2019-12-26 RX ADMIN — FERROUS SULFATE TAB 325 MG (65 MG ELEMENTAL FE) 325 MG: 325 (65 FE) TAB at 09:45

## 2019-12-26 RX ADMIN — DIGOXIN 125 MCG: 125 TABLET ORAL at 09:45

## 2019-12-26 RX ADMIN — CLOPIDOGREL BISULFATE 75 MG: 75 TABLET ORAL at 09:45

## 2019-12-26 RX ADMIN — VITAMIN D, TAB 1000IU (100/BT) 2000 UNITS: 25 TAB at 09:45

## 2019-12-26 RX ADMIN — BUPROPION HYDROCHLORIDE 150 MG: 150 TABLET, FILM COATED, EXTENDED RELEASE ORAL at 09:45

## 2019-12-26 NOTE — ASSESSMENT & PLAN NOTE
· Slightly elevated to 0 05/0 11/0 08 , patient denies any chest pain/pressure or anginal symptoms however somewhat limited by dementia  · Likely in the setting of hypertensive urgency  · EKG without ischemic change, no further workup

## 2019-12-26 NOTE — DISCHARGE SUMMARY
Discharge- John Garvin 1945, 69 y o  female MRN: [id number]    Unit/Bed#: -01 Encounter: [id number]    Primary Care Provider: Jf Ruiz DO   Date and time admitted to hospital: 12/23/2019  9:18 PM      * Acute metabolic encephalopathyresolved as of 12/26/2019  Assessment & Plan  · Concern for acute metabolic encephalopathy in the setting of acute cystitis, superimposed on baseline dementia  · Head CT negative, mentation improved back to baseline  · Consider viral process, a lot of family members had viral URI symptoms and the daughter at the bedside reports that her mother also had similar symptoms prior to admission  · Daughter reports that the only reason she asked the patient to be brought to the hospital was secondary to the hypertension    Acute cystitis with hematuriaresolved as of 12/26/2019  Assessment & Plan  · Low suspicion for acute cystitis  Patient has an indwelling Davis catheter, and likely has colonization  Her urine culture grew [id number] E coli  She has received 2 full days of IV antibiotics  I had a personal discussion with her urologist Dr Johnnie Swift, who agreed with me and recommended no further antibiotics on discharge    · Patient afebrile throughout the hospitalization, without leukocytosis  · Despite (+) urinalysis, patient really did not have symptoms truly consistent with urinary tract infection  · Davis catheter exchanged this admission    Dementia [facility])  Assessment & Plan  · Currently at baseline per daughter, lives with her daughter Abhijeet Felix  · Stable to return to prior living facility, [facility]  · PT/OT recommended at the facility    Stage 3 chronic kidney disease [facility])  Assessment & Plan  · Creatinine at baseline on discharge, 1 8    Hypertensive urgencyresolved as of 12/26/2019  Assessment & Plan  · Presenting with markedly elevated blood pressures; had not taken antihypertensives on the day of admission  · BP is now controlled, no change need    Paroxysmal atrial fibrillation (HCC)  Assessment & Plan  · Continue metoprolol and digoxin  · Anticoagulated on warfarin, INR therapeutic on discharge    Elevated troponin  Assessment & Plan  · Slightly elevated to 0 05/0 11/0 08 , patient denies any chest pain/pressure or anginal symptoms however somewhat limited by dementia  · Likely in the setting of hypertensive urgency  · EKG without ischemic change, no further workup    Coronary artery disease involving native coronary artery of native heart without angina pectoris  Assessment & Plan  · Continue Plavix, beta-blocker, statin    Hyperlipidemia  Assessment & Plan  · Continue statin therapy      Discharging Physician / Practitioner: Deneen Chavis PA-C  PCP: Whitley Mansfield DO  Admission Date:   Admission Orders (From admission, onward)     Ordered        12/24/19 0106  Inpatient Admission (expected length of stay for this patient Order details is greater than two midnights)  Once                   Discharge Date: 12/26/19    Resolved Problems  Date Reviewed: 12/26/2019          Resolved    Hypertensive urgency 12/26/2019     Resolved by  Deneen Chavis PA-C    Acute cystitis with hematuria 12/26/2019     Resolved by  Deneen Chavis PA-C    * (Principal) Acute metabolic encephalopathy 68/21/7647     Resolved by  Deneen Chavis PA-C          Consultations During Hospital Stay:  ·     Procedures Performed:   ·     Significant Findings / Test Results:   Results for Batool Giraldo (MRN [id number]) as of 12/26/2019 16:30   Ref   Range 12/26/2019 05:23   Sodium Latest Ref Range: 136 - 145 mmol/L 144   Potassium Latest Ref Range: 3 5 - 5 3 mmol/L 3 8   Chloride Latest Ref Range: 100 - 108 mmol/L 105   CO2 Latest Ref Range: 21 - 32 mmol/L 29   Anion Gap Latest Ref Range: 4 - 13 mmol/L 10   BUN Latest Ref Range: 5 - 25 mg/dL 38 (H)   Creatinine Latest Ref Range: 0 60 - 1 30 mg/dL 1 80 (H)   Glucose, Random Latest Ref Range: 65 - 140 mg/dL 132   Calcium Latest Ref Range: 8 3 - 10 1 mg/dL 9 2   eGFR Latest Units: ml/min/1 73sq m 28   WBC Latest Ref Range: 4 31 - 10 16 Thousand/uL 5 58   Red Blood Cell Count Latest Ref Range: 3 81 - 5 12 Million/uL 3 54 (L)   Hemoglobin Latest Ref Range: 11 5 - 15 4 g/dL 11 8   HCT Latest Ref Range: 34 8 - 46 1 % 36 8   MCV Latest Ref Range: 82 - 98 fL 104 (H)   MCH Latest Ref Range: 26 8 - 34 3 pg 33 3   MCHC Latest Ref Range: 31 4 - 37 4 g/dL 32 1   RDW Latest Ref Range: 11 6 - 15 1 % 14 7   Platelet Count Latest Ref Range: 149 - 390 Thousands/uL 138 (L)   MPV Latest Ref Range: 8 9 - 12 7 fL 12 3   Protime Latest Ref Range: 11 6 - 14 5 seconds 32 7 (H)   INR Latest Ref Range: 0 84 - 1 19  3 13 (H)       Incidental Findings:   ·      Test Results Pending at Discharge (will require follow up):   ·      Outpatient Tests Requested:  · INR recommended to be checked in 1-2 days    Complications:  None    Reason for Admission:  Acute on chronic encephalopathy, hypertensive urgency    Hospital Course: Josefa Hollins is a 69 y o  female patient who originally presented to the hospital on 12/23/2019 due to acute encephalopathy and hypertensive urgency  Patient has dementia and lives at a personal care facility, also has underlying urinary issues with chronic Davis catheter, hypertension, AFib on Coumadin, CKD, history of seizures a history of stroke  Patient brought in to rule out organic cause of encephalopathy  No acute findings  Initially there was concern that the patient had a UTI, however her urine culture did not support this  She has a chronic indwelling Davis catheter was exchanged, she does not need antibiotics on discharge  Her INR has been fluctuating because of the antibiotics  She should have that checked again in 1-2 days  Patient is back to her baseline mentation and ambulation, she can go back to [facility] and OT at the facility      Please see above list of diagnoses and related plan for additional information  Condition at Discharge: good     Discharge Day Visit / Exam:     Subjective:  Patient seen, she has baseline dementia but appears pleasant  She denies any specific complaints at this time  Her daughter is at the bedside and denies any specific complaints as well  She reports mom's back to her baseline mentation and is ambulating at her baseline  She would like for her to go back the living facility  She is declining rehab services  Vitals: Blood Pressure: 165/70 (12/26/19 0729)  Pulse: 55 (12/26/19 0729)  Temperature: 98 4 °F (36 9 °C) (12/26/19 0729)  Temp Source: Oral (12/25/19 1810)  Respirations: 17 (12/26/19 0729)  Height: 5' 3" (160 cm) (12/24/19 0345)  Weight - Scale: 84 7 kg (186 lb 11 7 oz) (12/26/19 0600)  SpO2: 90 % (12/26/19 0729)  Exam:   Physical Exam   Constitutional: She appears well-developed and well-nourished  No distress  HENT:   Mouth/Throat: Oropharynx is clear and moist    Cardiovascular: Normal rate, regular rhythm, S1 normal, S2 normal, normal heart sounds and intact distal pulses  No murmur heard  Pulmonary/Chest: Effort normal and breath sounds normal  No respiratory distress  She has no wheezes  She has no rales  Abdominal: Soft  Bowel sounds are normal  She exhibits no distension  There is no tenderness  Genitourinary:   Genitourinary Comments: Davis catheter draining clear yellow urine   Musculoskeletal: She exhibits no edema  Neurological: She is alert  No cranial nerve deficit  Nursing note and vitals reviewed  Discussion with Family: daughter at the bedside     Discharge instructions/Information to patient and family:   See after visit summary for information provided to patient and family  Provisions for Follow-Up Care:  See after visit summary for information related to follow-up care and any pertinent home health orders        Disposition:     Home with VNA Services (Reminder: Complete face to face encounter)    Planned Readmission: none, though likely in a patient with multiple comorbidities      Discharge Statement:  I spent 45 minutes discharging the patient  This time was spent on the day of discharge  I had direct contact with the patient on the day of discharge  Greater than 50% of the total time was spent examining patient, answering all patient questions, arranging and discussing plan of care with patient as well as directly providing post-discharge instructions  Additional time then spent on discharge activities  Discharge Medications:  See after visit summary for reconciled discharge medications provided to patient and family        ** Please Note: This note has been constructed using a voice recognition system **

## 2019-12-26 NOTE — ASSESSMENT & PLAN NOTE
· Low suspicion for acute cystitis  Patient has an indwelling Davis catheter, and likely has colonization  Her urine culture grew [id number] E coli  She has received 2 full days of IV antibiotics  I had a personal discussion with her urologist Dr Petrona Lee, who agreed with me and recommended no further antibiotics on discharge    · Patient afebrile throughout the hospitalization, without leukocytosis  · Despite (+) urinalysis, patient really did not have symptoms truly consistent with urinary tract infection  · Davis catheter exchanged this admission

## 2019-12-26 NOTE — ASSESSMENT & PLAN NOTE
· Concern for acute metabolic encephalopathy in the setting of acute cystitis, superimposed on baseline dementia  · Head CT negative, mentation improved back to baseline  · Consider viral process, a lot of family members had viral URI symptoms and the daughter at the bedside reports that her mother also had similar symptoms prior to admission  · Daughter reports that the only reason she asked the patient to be brought to the hospital was secondary to the hypertension

## 2019-12-26 NOTE — ASSESSMENT & PLAN NOTE
· Currently at baseline per daughter, lives with her daughter Debbie Araya  · Stable to return to prior living facility, [facility]  · PT/OT recommended at the facility

## 2019-12-26 NOTE — ASSESSMENT & PLAN NOTE
· Presenting with markedly elevated blood pressures; had not taken antihypertensives on the day of admission  · BP is now controlled, no change need

## 2019-12-29 LAB
BACTERIA BLD CULT: NORMAL
BACTERIA BLD CULT: NORMAL

## 2019-12-31 LAB — HBA1C MFR BLD HPLC: 6.5 %

## 2020-01-03 ENCOUNTER — TELEPHONE (OUTPATIENT)
Dept: CARDIOLOGY CLINIC | Facility: CLINIC | Age: 75
End: 2020-01-03

## 2020-01-03 NOTE — TELEPHONE ENCOUNTER
Raz Metz called from [facility] and wanted to let Lesley Portillo know that they will be monitoring pt's PT INR  Raz Metz stated no call back is needed unless you need to speak with her

## 2020-01-05 ENCOUNTER — HOSPITAL ENCOUNTER (EMERGENCY)
Facility: HOSPITAL | Age: 75
Discharge: HOME | End: 2020-01-05
Attending: EMERGENCY MEDICINE | Admitting: EMERGENCY MEDICINE
Payer: MEDICARE

## 2020-01-05 ENCOUNTER — APPOINTMENT (EMERGENCY)
Dept: CT IMAGING | Facility: HOSPITAL | Age: 75
End: 2020-01-05
Payer: MEDICARE

## 2020-01-05 VITALS
HEIGHT: 62 IN | BODY MASS INDEX: 33.43 KG/M2 | OXYGEN SATURATION: 93 % | DIASTOLIC BLOOD PRESSURE: 68 MMHG | TEMPERATURE: 97.6 F | HEART RATE: 44 BPM | WEIGHT: 181.66 LBS | SYSTOLIC BLOOD PRESSURE: 155 MMHG | RESPIRATION RATE: 20 BRPM

## 2020-01-05 DIAGNOSIS — R11.10 VOMITING: Primary | ICD-10-CM

## 2020-01-05 LAB
ALBUMIN SERPL BCP-MCNC: 3.3 G/DL (ref 3.5–5)
ALP SERPL-CCNC: 57 U/L (ref 46–116)
ALT SERPL W P-5'-P-CCNC: 23 U/L (ref 12–78)
ANION GAP SERPL CALCULATED.3IONS-SCNC: 7 MMOL/L (ref 4–13)
AST SERPL W P-5'-P-CCNC: 19 U/L (ref 5–45)
BASOPHILS # BLD AUTO: 0.04 THOUSANDS/ΜL (ref 0–0.1)
BASOPHILS NFR BLD AUTO: 1 % (ref 0–1)
BILIRUB DIRECT SERPL-MCNC: 0.15 MG/DL (ref 0–0.2)
BILIRUB SERPL-MCNC: 0.4 MG/DL (ref 0.2–1)
BUN SERPL-MCNC: 25 MG/DL (ref 5–25)
CALCIUM SERPL-MCNC: 9.3 MG/DL (ref 8.3–10.1)
CHLORIDE SERPL-SCNC: 107 MMOL/L (ref 100–108)
CO2 SERPL-SCNC: 30 MMOL/L (ref 21–32)
CREAT SERPL-MCNC: 1.52 MG/DL (ref 0.6–1.3)
DIGOXIN SERPL-MCNC: 1.6 NG/ML (ref 0.8–2)
EOSINOPHIL # BLD AUTO: 0.28 THOUSAND/ΜL (ref 0–0.61)
EOSINOPHIL NFR BLD AUTO: 4 % (ref 0–6)
ERYTHROCYTE [DISTWIDTH] IN BLOOD BY AUTOMATED COUNT: 14.6 % (ref 11.6–15.1)
GFR SERPL CREATININE-BSD FRML MDRD: 34 ML/MIN/1.73SQ M
GLUCOSE SERPL-MCNC: 180 MG/DL (ref 65–140)
HCT VFR BLD AUTO: 37.7 % (ref 34.8–46.1)
HGB BLD-MCNC: 12 G/DL (ref 11.5–15.4)
IMM GRANULOCYTES # BLD AUTO: 0.17 THOUSAND/UL (ref 0–0.2)
IMM GRANULOCYTES NFR BLD AUTO: 2 % (ref 0–2)
INR PPP: 2.87 (ref 0.84–1.19)
LIPASE SERPL-CCNC: 199 U/L (ref 73–393)
LYMPHOCYTES # BLD AUTO: 1.58 THOUSANDS/ΜL (ref 0.6–4.47)
LYMPHOCYTES NFR BLD AUTO: 21 % (ref 14–44)
MCH RBC QN AUTO: 33.6 PG (ref 26.8–34.3)
MCHC RBC AUTO-ENTMCNC: 31.8 G/DL (ref 31.4–37.4)
MCV RBC AUTO: 106 FL (ref 82–98)
MONOCYTES # BLD AUTO: 0.59 THOUSAND/ΜL (ref 0.17–1.22)
MONOCYTES NFR BLD AUTO: 8 % (ref 4–12)
NEUTROPHILS # BLD AUTO: 4.76 THOUSANDS/ΜL (ref 1.85–7.62)
NEUTS SEG NFR BLD AUTO: 64 % (ref 43–75)
NRBC BLD AUTO-RTO: 0 /100 WBCS
PLATELET # BLD AUTO: 193 THOUSANDS/UL (ref 149–390)
PMV BLD AUTO: 12 FL (ref 8.9–12.7)
POTASSIUM SERPL-SCNC: 4 MMOL/L (ref 3.5–5.3)
PROT SERPL-MCNC: 7.3 G/DL (ref 6.4–8.2)
PROTHROMBIN TIME: 30.5 SECONDS (ref 11.6–14.5)
RBC # BLD AUTO: 3.57 MILLION/UL (ref 3.81–5.12)
SODIUM SERPL-SCNC: 144 MMOL/L (ref 136–145)
TROPONIN I SERPL-MCNC: 0.02 NG/ML
WBC # BLD AUTO: 7.42 THOUSAND/UL (ref 4.31–10.16)

## 2020-01-05 PROCEDURE — 80076 HEPATIC FUNCTION PANEL: CPT | Performed by: EMERGENCY MEDICINE

## 2020-01-05 PROCEDURE — 84484 ASSAY OF TROPONIN QUANT: CPT | Performed by: EMERGENCY MEDICINE

## 2020-01-05 PROCEDURE — 80048 BASIC METABOLIC PNL TOTAL CA: CPT | Performed by: EMERGENCY MEDICINE

## 2020-01-05 PROCEDURE — 85025 COMPLETE CBC W/AUTO DIFF WBC: CPT | Performed by: EMERGENCY MEDICINE

## 2020-01-05 PROCEDURE — 93005 ELECTROCARDIOGRAM TRACING: CPT

## 2020-01-05 PROCEDURE — 74176 CT ABD & PELVIS W/O CONTRAST: CPT

## 2020-01-05 PROCEDURE — 96374 THER/PROPH/DIAG INJ IV PUSH: CPT

## 2020-01-05 PROCEDURE — 36415 COLL VENOUS BLD VENIPUNCTURE: CPT | Performed by: EMERGENCY MEDICINE

## 2020-01-05 PROCEDURE — 80162 ASSAY OF DIGOXIN TOTAL: CPT | Performed by: EMERGENCY MEDICINE

## 2020-01-05 PROCEDURE — 99284 EMERGENCY DEPT VISIT MOD MDM: CPT

## 2020-01-05 PROCEDURE — 85610 PROTHROMBIN TIME: CPT | Performed by: EMERGENCY MEDICINE

## 2020-01-05 PROCEDURE — 99285 EMERGENCY DEPT VISIT HI MDM: CPT | Performed by: EMERGENCY MEDICINE

## 2020-01-05 PROCEDURE — 83690 ASSAY OF LIPASE: CPT | Performed by: EMERGENCY MEDICINE

## 2020-01-05 RX ORDER — CLINDAMYCIN HYDROCHLORIDE 300 MG/1
600 CAPSULE ORAL EVERY 8 HOURS
COMMUNITY
Start: 2019-12-30 | End: 2020-12-29

## 2020-01-05 RX ORDER — PANTOPRAZOLE SODIUM 40 MG/1
40 TABLET, DELAYED RELEASE ORAL
COMMUNITY
End: 2020-04-22

## 2020-01-05 RX ORDER — ONDANSETRON 4 MG/1
4 TABLET, FILM COATED ORAL EVERY 6 HOURS
Qty: 10 TABLET | Refills: 0 | Status: SHIPPED | OUTPATIENT
Start: 2020-01-05 | End: 2020-04-16 | Stop reason: ALTCHOICE

## 2020-01-05 RX ORDER — IPRATROPIUM BROMIDE AND ALBUTEROL SULFATE 2.5; .5 MG/3ML; MG/3ML
3 SOLUTION RESPIRATORY (INHALATION) 4 TIMES DAILY
COMMUNITY
End: 2021-06-25 | Stop reason: HOSPADM

## 2020-01-05 RX ORDER — ONDANSETRON 2 MG/ML
4 INJECTION INTRAMUSCULAR; INTRAVENOUS ONCE
Status: COMPLETED | OUTPATIENT
Start: 2020-01-05 | End: 2020-01-05

## 2020-01-05 RX ORDER — ONDANSETRON 4 MG/1
4 TABLET, FILM COATED ORAL EVERY 6 HOURS
Qty: 10 TABLET | Refills: 0 | Status: SHIPPED | OUTPATIENT
Start: 2020-01-05 | End: 2020-01-05 | Stop reason: SDUPTHER

## 2020-01-05 RX ADMIN — ONDANSETRON 4 MG: 2 INJECTION INTRAMUSCULAR; INTRAVENOUS at 10:25

## 2020-01-05 NOTE — DISCHARGE INSTRUCTIONS
Zofran if needed for nausea every 8 hours  Please give her pills slowly, she seems to choke on her pills, give them 1 or 2 at a time so she can take them slowly    Follow up with her doctor

## 2020-01-05 NOTE — ED NOTES
Discharge instructions reviewed, patients daughter has no new complaints or concerns at time of discharge  Patient taken out of department in wheelchair, accompanied by her daughter        Girma Harvey RN  01/05/20 [id number]

## 2020-01-05 NOTE — ED PROVIDER NOTES
History  Chief Complaint   Patient presents with    Nausea     As per EMS, patient became nauseous and vomitted x's 1 while attempting to take her morning medications  Patient currently denies nausea, abdominal pain, chest pain, dizziness  HPI  Patient is a 69-year-old female, nursing home resident, apparently awoke this morning with some nausea, patient has history of dementia nursing reports she vomited her meds so they sent her to the hospital   Patient denies any abdominal pain at this time there is no chest pain  Denies any focal weakness  Patient is oriented to person and knows her birth year but does not know her age  Otherwise she denies any symptoms  She reports feeling primarily nauseous  Past medical history of hypertension, dementia, metabolic encephalopathy, hypertension stroke, COPD, nursing home resident,  Family history noncontributory  Social history, nursing home resident, denies drug abuse, nonsmoker  Prior to Admission Medications   Prescriptions Last Dose Informant Patient Reported? Taking?    Acetaminophen 325 MG CAPS Past Month at Unknown time Outside Facility (Specify) Yes Yes   Sig: Take by mouth   Cholecalciferol (VITAMIN D) 2000 units CAPS 1/4/2020 at Unknown time Outside Facility (Specify) No Yes   Sig: Take 1 capsule (2,000 Units total) by mouth daily   Citric Uc-Gweikqybnkx-Vx Carb (RENACIDIN) SOLN Past Week at Unknown time Outside Facility (Specify) Yes Yes   Sig: Indications: 60ml to irrigate baca once a week on Friday   LORazepam (ATIVAN) 0 5 mg tablet   No No   Sig: Take 1 tablet (0 5 mg total) by mouth 2 (two) times a day for 5 doses   albuterol (PROVENTIL HFA,VENTOLIN HFA) 90 mcg/act inhaler 1/4/2020 at Unknown time Outside Facility (Specify) No Yes   Sig: Inhale 2 puffs every 6 (six) hours as needed for wheezing   atorvastatin (LIPITOR) 10 mg tablet 1/4/2020 at Unknown time Outside Facility (Specify) No Yes   Sig: Take 1 tablet (10 mg total) by mouth daily betamethasone dipropionate (DIPROSONE) 0 05 % cream 1/4/2020 at Unknown time Outside Facility ([address] [place] [address]) Yes Yes   Sig: Apply topically 3 (three) times a day   buPROPion (WELLBUTRIN SR) 150 mg 12 hr tablet 1/4/2020 at Unknown time Outside Facility ([address] [place] [address]) No Yes   Sig: Take 1 tablet (150 mg total) by mouth daily   carBAMazepine (TEGretol) 100 mg chewable tablet 1/4/2020 at Unknown time Outside Facility (Specify) No Yes   Sig: Chew 1 tablet (100 mg total) 3 (three) times a day   clindamycin (CLEOCIN) 300 MG capsule 1/4/2020 at Unknown time  Yes Yes   Sig: Take 600 mg by mouth every 8 (eight) hours   clopidogrel (PLAVIX) 75 mg tablet 1/4/2020 at Unknown time Outside Facility (Specify) No Yes   Sig: Take 1 tablet (75 mg total) by mouth daily   digoxin (LANOXIN) 0 125 mg tablet 1/4/2020 at Unknown time Outside Facility ([address] [place] [address]) Yes Yes   Sig: Take 125 mcg by mouth daily   divalproex sodium (DEPAKOTE ER) 250 mg 24 hr tablet 1/4/2020 at Unknown time Outside Facility (Specify) No Yes   Sig: Take 1 tablet (250 mg total) by mouth 2 (two) times a day   fenofibrate micronized (LOFIBRA) 67 MG capsule 1/4/2020 at Unknown time Outside Facility (Specify) No Yes   Sig: Take 1 capsule (67 mg total) by mouth daily with breakfast   ferrous sulfate 325 (65 Fe) mg tablet 1/4/2020 at Unknown time Outside Facility (Specify) No Yes   Sig: Take 1 tablet (325 mg total) by mouth 2 (two) times a day   fluticasone-salmeterol (ADVAIR, WIXELA) 250-50 mcg/dose inhaler 1/4/2020 at Unknown time  Yes Yes   Sig: Inhale 2 puffs 2 (two) times a day Rinse mouth after use     fluticasone-vilanterol (BREO ELLIPTA) 100-25 mcg/inh inhaler  Outside Facility (Specify) No No   Sig: Inhale 1 puff daily   furosemide (LASIX) 40 mg tablet 1/4/2020 at Unknown time Outside Facility (Specify) No Yes   Sig: Take 1 tablet (40 mg total) by mouth daily   ipratropium-albuterol (DUO-NEB) 0 5-2 5 mg/3 mL nebulizer solution 1/4/2020 at Unknown time  Yes Yes   Sig: Take 3 mL by nebulization 4 (four) times a day   lactobacillus acidophilus 1/4/2020 at Unknown time Outside Facility ([facility]) Yes Yes   Sig: Take 1 capsule by mouth daily   levothyroxine 88 mcg tablet 1/4/2020 at Unknown time Outside Facility (Specify) No Yes   Sig: Take 1 tablet (88 mcg total) by mouth daily   metoprolol tartrate (LOPRESSOR) 100 mg tablet 1/4/2020 at Unknown time Outside Facility (Specify) No Yes   Sig: Take 1 tablet (100 mg total) by mouth every 12 (twelve) hours   nitroglycerin (NITROSTAT) 0 4 mg SL tablet More than a month at Unknown time Outside Facility (Specify) No No   Sig: Place 1 tablet (0 4 mg total) under the tongue every 5 (five) minutes as needed for chest pain   pantoprazole (PROTONIX) 40 mg tablet 1/4/2020 at Unknown time  Yes Yes   Sig: Take 40 mg by mouth daily at bedtime   potassium chloride (K-DUR,KLOR-CON) 20 mEq tablet 1/4/2020 at Unknown time Outside Facility (Specify) No Yes   Sig: Take 1 tablet (20 mEq total) by mouth daily   warfarin (COUMADIN) 3 mg tablet 1/4/2020 at Unknown time Outside Facility (Specify) No Yes   Sig: One tablet daily or as otherwise directed        Facility-Administered Medications: None       Past Medical History:   Diagnosis Date    Atrial fibrillation ([facility] [address] )     Brain aneurysm     CAD (coronary artery disease)     Cardiac disease     had st[facility] 12 years [facility]    CKD (chronic kidney disease) stage 3, GFR 30-59 ml/min ([facility] ckd    COPD (chronic obstructive pulmonary disease) ([facility])     Dementia ([facility])     Diastolic CHF, chronic (HCC)     GERD (gastroesophageal reflux disease)     Hyperlipidemia     Hyperlipidemia     Hypertension     Hypothyroidism     Migraine     PAD (peripheral artery disease) ([facility])     Renal disorder     Seizures ([facility] [address] )     Stroke [facility])        Past Surgical History:   Procedure Laterality Date    APPENDECTOMY      ARTERIOGRAM N/A 12/7/2018    Procedure: ARTERIOGRAM WITH STENT PLACEMENT;  Surgeon: Evita iRvera Doctor, MD;  [facility]: [facility];  Service: Vascular    BLADDER TUMOR EXCISION      BRAIN SURGERY      1998  clip right frontal lobe    CARDIAC SURGERY      COLONOSCOPY      CORONARY STENT PLACEMENT      5 stents    EYE SURGERY      IR ABDOMINAL ANGIOGRAPHY / INTERVENTION  2018    MANDIBLE FRACTURE SURGERY      TONSILLECTOMY      TUBAL LIGATION      UTERINE FIBROID SURGERY         Family History   Problem Relation Age of Onset    Heart disease Father     Parkinsonism Father     Macular degeneration Mother     Glaucoma Mother     Diabetes Brother     Heart disease Brother      I have reviewed and agree with the history as documented  Social History     Tobacco Use    Smoking status: Former Smoker     Packs/day: 5 00     Years: 40 00     Pack years: 200 00     Last attempt to quit: 2007     Years since quittin 3    Smokeless tobacco: Never Used   Substance Use Topics    Alcohol use: Never     Alcohol/week: 0 0 standard drinks     Frequency: Never    Drug use: No        Review of Systems   Constitutional: Negative for diaphoresis, fatigue and fever  HENT: Negative for congestion, ear pain, nosebleeds and sore throat  Eyes: Negative for photophobia, pain, discharge and visual disturbance  Respiratory: Negative for cough, choking, chest tightness, shortness of breath and wheezing  Cardiovascular: Negative for chest pain and palpitations  Gastrointestinal: Positive for nausea  Negative for abdominal distention, abdominal pain, diarrhea and vomiting  Genitourinary: Negative for dysuria, flank pain and frequency  Musculoskeletal: Negative for back pain, gait problem and joint swelling  Skin: Negative for color change and rash  Neurological: Negative for dizziness, syncope and headaches  Psychiatric/Behavioral: Negative for behavioral problems and confusion  The patient is not nervous/anxious  All other systems reviewed and are negative        Physical Exam  Physical Exam   Constitutional: She is oriented to person, place, and time  She appears well-developed and well-nourished  HENT:   Head: Normocephalic  Right Ear: External ear normal    Left Ear: External ear normal    Nose: Nose normal    Mouth/Throat: Oropharynx is clear and moist    Eyes: Pupils are equal, round, and reactive to light  EOM and lids are normal    Neck: Normal range of motion  Neck supple  Cardiovascular: Normal rate, regular rhythm, normal heart sounds and intact distal pulses  Pulmonary/Chest: Effort normal and breath sounds normal  No respiratory distress  Abdominal: Soft  Bowel sounds are normal  There is no tenderness  No hernias or masses   Musculoskeletal: Normal range of motion  She exhibits no deformity  Neurological: She is alert and oriented to person, place, and time  Skin: Skin is warm and dry  Psychiatric: She has a normal mood and affect  Nursing note and vitals reviewed     Pulse oximetry 98% on room air adequate oxygenation, there is no hypoxia  Vital Signs  ED Triage Vitals [01/05/20 1003]   Temperature Pulse Respirations Blood Pressure SpO2   97 6 °F (36 4 °C) (!) 50 18 152/68 98 %      Temp Source Heart Rate Source Patient Position - Orthostatic VS BP Location FiO2 (%)   Oral Monitor Lying Right arm --      Pain Score       No Pain           Vitals:    01/05/20 1003 01/05/20 1100 01/05/20 1200 01/05/20 1230   BP: 152/68 131/62 (!) 172/74 155/68   Pulse: (!) 50 (!) 43 (!) 43 (!) 44   Patient Position - Orthostatic VS: Lying Lying Lying Lying         Visual Acuity      ED Medications  Medications   ondansetron (ZOFRAN) injection 4 mg (4 mg Intravenous Given 1/5/20 1025)       Diagnostic Studies  Results Reviewed     Procedure Component Value Units Date/Time    Protime-INR [[id number]]  (Abnormal) Collected:  01/05/20 1021    Lab Status:  Final result Specimen:  Blood from Arm, Right Updated:  01/05/20 1105     Protime 30 5 seconds      INR 2 87    Troponin I [[id number]]  (Normal) Collected:  01/05/20 1021    Lab Status:  Final result Specimen:  Blood from Arm, Right Updated:  01/05/20 1058     Troponin I 0 02 ng/mL     Basic metabolic panel [[id number]]  (Abnormal) Collected:  01/05/20 1021    Lab Status:  Final result Specimen:  Blood from Arm, Right Updated:  01/05/20 1057     Sodium 144 mmol/L      Potassium 4 0 mmol/L      Chloride 107 mmol/L      CO2 30 mmol/L      ANION GAP 7 mmol/L      BUN 25 mg/dL      Creatinine 1 52 mg/dL      Glucose 180 mg/dL      Calcium 9 3 mg/dL      eGFR 34 ml/min/1 73sq m     Narrative:       [place] guidelines for Chronic Kidney Disease (CKD):     Stage 1 with normal or high GFR (GFR > 90 mL/min/1 73 square meters)    Stage 2 Mild CKD (GFR = 60-89 mL/min/1 73 square meters)    Stage 3A Moderate CKD (GFR = 45-59 mL/min/1 73 square meters)    Stage 3B Moderate CKD (GFR = 30-44 mL/min/1 73 square meters)    Stage 4 Severe CKD (GFR = 15-29 mL/min/1 73 square meters)    Stage 5 End Stage CKD (GFR <15 mL/min/1 73 square meters)  Note: GFR calculation is accurate only with a steady state creatinine    Hepatic function panel [[id number]]  (Abnormal) Collected:  01/05/20 1021    Lab Status:  Final result Specimen:  Blood from Arm, Right Updated:  01/05/20 1057     Total Bilirubin 0 40 mg/dL      Bilirubin, Direct 0 15 mg/dL      Alkaline Phosphatase 57 U/L      AST 19 U/L      ALT 23 U/L      Total Protein 7 3 g/dL      Albumin 3 3 g/dL     Lipase [[id number]]  (Normal) Collected:  01/05/20 1021    Lab Status:  Final result Specimen:  Blood from Arm, Right Updated:  01/05/20 1057     Lipase 199 u/L     Digoxin level [[id number]]  (Normal) Collected:  01/05/20 1021    Lab Status:  Final result Specimen:  Blood from Arm, Right Updated:  01/05/20 1055     Digoxin Lvl 1 6 ng/mL     CBC and differential [[id number]]  (Abnormal) Collected:  01/05/20 1021    Lab Status:  Final result Specimen:  Blood from Arm, Right Updated:  01/05/20 1032     WBC 7 42 Thousand/uL      RBC 3 57 Million/uL      Hemoglobin 12 0 g/dL      Hematocrit 37 7 %       fL      MCH 33 6 pg      MCHC 31 8 g/dL      RDW 14 6 %      MPV 12 0 fL      Platelets 539 Thousands/uL      nRBC 0 /100 WBCs      Neutrophils Relative 64 %      Immat GRANS % 2 %      Lymphocytes Relative 21 %      Monocytes Relative 8 %      Eosinophils Relative 4 %      Basophils Relative 1 %      Neutrophils Absolute 4 76 Thousands/µL      Immature Grans Absolute 0 17 Thousand/uL      Lymphocytes Absolute 1 58 Thousands/µL      Monocytes Absolute 0 59 Thousand/µL      Eosinophils Absolute 0 28 Thousand/µL      Basophils Absolute 0 04 Thousands/µL                  CT abdomen pelvis wo contrast   Final Result by Dominique Tirado MD (01/05 1239)      Stable fat-containing ventral hernia  Cholelithiasis without CT evidence of acute cholecystitis  Workstation performed: [id number]                    Procedures  ECG 12 Lead Documentation Only  Date/Time: 1/5/2020 2:45 PM  Performed by: King Mariee MD  Authorized by: King Mariee MD     Indications / Diagnosis:  Nausea vomiting  ECG reviewed by me, the ED Provider: yes    Patient location:  ED  Previous ECG:     Previous ECG:  Compared to current    Comparison ECG info:  December 23, 2019    Similarity:  Changes noted  Interpretation:     Interpretation: non-specific    Rate:     ECG rate:  Forty-six  Rhythm:     Rhythm: sinus bradycardia    Comments:      EKG today's use nonspecific ST-T wave changes, patient had previously a scooped out ST segment in V1 and AVR, EKG looks similar today  Nonspecific anterior depressions  No acute ST elevations  ED Course          the preop  Identification of Seniors at Risk      Most Recent Value   (ISAR) Identification of Seniors at Risk   Before the illness or injury that brought you to the Emergency, did you need someone to help you on a regular basis?   1 Filed at: 01/05/2020 1005   In the last 24 hours, have you needed more help than usual?  0 Filed at: 01/05/2020 1005   Have you been hospitalized for one or more nights during the past 6 months? 0 Filed at: 01/05/2020 1005   In general, do you see well?  0 Filed at: 01/05/2020 1005   In general, do you have serious problems with your memory? 1 Filed at: 01/05/2020 1005   Do you take more than three different medications every day? 1 Filed at: 01/05/2020 1005   ISAR Score  3 Filed at: 01/05/2020 1005         patient's daughter arrived and reports the patient frequently does vomit like this when they gave her her pills  Apparently she tries to take them all at once  INR was elevated 2 87 consistent with the patient's Coumadin use  Patient's cardiac troponin was negative no sign of cardiac ischemia troponin was required because patient has nausea, nonspecific EKG changes and dementia concern for cardiac ischemia that was occult was negative  BUN creatinine were normal no sign of renal dysfunction  lipase was normal no sign of pancreatitis, white count was normal at 7 4 no sign of inflammation, hemoglobin was normal at 12 no sign of anemia  CT scan was normal, CT was required because the patient has dementia and had vomiting and nausea, concern for intra-abdominal pathology        MDM medical decision making 71-year-old female, history of dementia, confused historian presents after an episode of nausea and vomiting at the nursing home  Diagnostic workup showed no acute pathology  Daughter believes the patient takes too much of her medicines at the same time in the pills gag her and cause her to vomit  We discussed outpatient follow-up  Discussed indications to return      Disposition  Final diagnoses:   Vomiting     Time reflects when diagnosis was documented in both MDM as applicable and the Disposition within this note     Time User Action Codes Description Comment    1/5/2020 12:39 PM Laverne Mac [R11 10] Vomiting     1/5/2020 12:48 PM Oly Murray Remove [R11 10] Vomiting     1/5/2020 12:48 PM Oly Murray Add [R11 10] Vomiting       ED Disposition     ED Disposition Condition Date/Time Comment    Discharge Stable Sun Jan 5, 2020 12:48 PM Rejeaarlene Blight discharge to home/self care              Follow-up Information    None         Discharge Medication List as of 1/5/2020 12:49 PM      START taking these medications    Details   ondansetron (ZOFRAN) 4 mg tablet Take 1 tablet (4 mg total) by mouth every 6 (six) hours for 10 doses, Starting Sun 1/5/2020, Until Wed 1/8/2020, Normal         CONTINUE these medications which have NOT CHANGED    Details   Acetaminophen 325 MG CAPS Take by mouth, Historical Med      albuterol (PROVENTIL HFA,VENTOLIN HFA) 90 mcg/act inhaler Inhale 2 puffs every 6 (six) hours as needed for wheezing, Starting Fri 12/14/2018, Print      atorvastatin (LIPITOR) 10 mg tablet Take 1 tablet (10 mg total) by mouth daily, Starting Fri 12/14/2018, Print      betamethasone dipropionate (DIPROSONE) 0 05 % cream Apply topically 3 (three) times a day, Historical Med      buPROPion (WELLBUTRIN SR) 150 mg 12 hr tablet Take 1 tablet (150 mg total) by mouth daily, Starting Fri 12/14/2018, Print      carBAMazepine (TEGretol) 100 mg chewable tablet Chew 1 tablet (100 mg total) 3 (three) times a day, Starting Fri 12/14/2018, Print      Cholecalciferol (VITAMIN D) 2000 units CAPS Take 1 capsule (2,000 Units total) by mouth daily, Starting Fri 12/14/2018, Print      Citric Xi-Hyexbtiqygm-Xc Carb (RENACIDIN) SOLN Indications: 60ml to irrigate baca once a week on Friday, Historical Med      clindamycin (CLEOCIN) 300 MG capsule Take 600 mg by mouth every 8 (eight) hours, Starting Mon 12/30/2019, Historical Med      clopidogrel (PLAVIX) 75 mg tablet Take 1 tablet (75 mg total) by mouth daily, Starting Fri 12/14/2018, Print      digoxin (LANOXIN) 0 125 mg tablet Take 125 mcg by mouth daily, Historical Med divalproex sodium (DEPAKOTE ER) 250 mg 24 hr tablet Take 1 tablet (250 mg total) by mouth 2 (two) times a day, Starting Wed 1/16/2019, No Print      fenofibrate micronized (LOFIBRA) 67 MG capsule Take 1 capsule (67 mg total) by mouth daily with breakfast, Starting Fri 12/14/2018, Print      ferrous sulfate 325 (65 Fe) mg tablet Take 1 tablet (325 mg total) by mouth 2 (two) times a day, Starting Fri 12/14/2018, Print      fluticasone-salmeterol (ADVAIR, [place] ([place])) 250-50 mcg/dose inhaler Inhale 2 puffs 2 (two) times a day Rinse mouth after use , Historical Med      fluticasone-vilanterol (BREO ELLIPTA) 100-25 mcg/inh inhaler Inhale 1 puff daily, Starting Fri 12/14/2018, Print      furosemide (LASIX) 40 mg tablet Take 1 tablet (40 mg total) by mouth daily, Starting Mon 10/28/2019, Normal      ipratropium-albuterol (DUO-NEB) 0 5-2 5 mg/3 mL nebulizer solution Take 3 mL by nebulization 4 (four) times a day, Historical Med      lactobacillus acidophilus Take 1 capsule by mouth daily, Historical Med      levothyroxine 88 mcg tablet Take 1 tablet (88 mcg total) by mouth daily, Starting Fri 12/14/2018, Print      LORazepam (ATIVAN) 0 5 mg tablet Take 1 tablet (0 5 mg total) by mouth 2 (two) times a day for 5 doses, Starting Tue 10/15/2019, Until Fri 10/25/2019, Print      metoprolol tartrate (LOPRESSOR) 100 mg tablet Take 1 tablet (100 mg total) by mouth every 12 (twelve) hours, Starting Fri 10/25/2019, Print      nitroglycerin (NITROSTAT) 0 4 mg SL tablet Place 1 tablet (0 4 mg total) under the tongue every 5 (five) minutes as needed for chest pain, Starting Sat 5/12/2018, Normal      pantoprazole (PROTONIX) 40 mg tablet Take 40 mg by mouth daily at bedtime, Historical Med      potassium chloride (K-DUR,KLOR-CON) 20 mEq tablet Take 1 tablet (20 mEq total) by mouth daily, Starting Fri 12/14/2018, Print      warfarin (COUMADIN) 3 mg tablet One tablet daily or as otherwise directed  , Print           No discharge procedures on file     ED Provider  Electronically Signed by           Lara Brown MD  01/05/20 7000

## 2020-01-07 LAB
ATRIAL RATE: 46 BPM
P AXIS: 76 DEGREES
PR INTERVAL: 178 MS
QRS AXIS: -20 DEGREES
QRSD INTERVAL: 100 MS
QT INTERVAL: 454 MS
QTC INTERVAL: 397 MS
T WAVE AXIS: 77 DEGREES
VENTRICULAR RATE: 46 BPM

## 2020-01-07 PROCEDURE — 93010 ELECTROCARDIOGRAM REPORT: CPT | Performed by: INTERNAL MEDICINE

## 2020-01-16 ENCOUNTER — OFFICE VISIT (OUTPATIENT)
Dept: PULMONOLOGY | Facility: CLINIC | Age: 75
End: 2020-01-16
Payer: MEDICARE

## 2020-01-16 VITALS
SYSTOLIC BLOOD PRESSURE: 120 MMHG | BODY MASS INDEX: 33.31 KG/M2 | DIASTOLIC BLOOD PRESSURE: 70 MMHG | OXYGEN SATURATION: 96 % | WEIGHT: 181 LBS | HEIGHT: 62 IN | HEART RATE: 52 BPM

## 2020-01-16 DIAGNOSIS — G47.33 OSA (OBSTRUCTIVE SLEEP APNEA): Primary | ICD-10-CM

## 2020-01-16 DIAGNOSIS — J41.0 SIMPLE CHRONIC BRONCHITIS (HCC): Chronic | ICD-10-CM

## 2020-01-16 PROCEDURE — 99214 OFFICE O/P EST MOD 30 MIN: CPT | Performed by: PHYSICIAN ASSISTANT

## 2020-01-16 NOTE — LETTER
January 16, 2020     Patient: Braulio Diaz   YOB: 1945   Date of Visit: 1/16/2020       To Whom it May Concern: Braulio Diaz is under my professional care  She was seen in my office on 1/16/2020  She requires 2L oxygen with exertion, will need to travel with the oxygen  If you have any questions or concerns, please don't hesitate to call           Sincerely,          Sanjuanita Yuan PA-C        CC: No Recipients

## 2020-01-16 NOTE — PROGRESS NOTES
Assessment/Plan:   Diagnoses and all orders for this visit:    JESENIA (obstructive sleep apnea)    Simple chronic bronchitis ([facility] )     Patient is here today for follow-up with her daughter   She was hospitalized twice since her last visit with us for CHF, renal failure, metabolic encephalopathy   Currently back to her baseline status   Breathing is at baseline , continues with Breo , albuterol 4 times per day as needed  The continues to be issues with compliance with her CPAP given her underlying dementia   Some nights she will leave it on for a few hours, usually will take it off at some point  On the nights that she does not use it she is using oxygen   They will continue to attempt a CPAP each night and encourage patient to use it  She can continue 6 month follow-up with us  Return in about 6 months (around 7/16/2020)  All questions are answered to the patient's satisfaction and understanding  She verbalizes understanding  She is encouraged to call with any further questions or concerns  Portions of the record may have been created with voice recognition software  Occasional wrong word or "sound a like" substitutions may have occurred due to the inherent limitations of voice recognition software  Read the chart carefully and recognize, using context, where substitutions have occurred      Electronically Signed by Javier Perez PA-C    ______________________________________________________________________    Chief Complaint:   Chief Complaint   Patient presents with    Sleep Apnea     fup     COPD       Patient ID: Skip Owens is a 69 y o  y o  female has a past medical history of Atrial fibrillation [facility] ), Brain aneurysm, CAD (coronary artery disease), Cardiac disease, CKD (chronic kidney disease) stage 3, GFR 30-59 ml/min ([facility]) (ckd), COPD [facility] obstructive [facility] Dementia ([facility] ), Diastolic CHF, chronic ([facility] ), GERD (gastroesophageal reflux disease), Hyperlipidemia, Hyperlipidemia, Hypertension, Hypothyroidism, Migraine, PAD (peripheral artery disease) ([facility] ), Renal disorder, Seizures ([facility] ), and Stroke ([facility] )  1/16/2020  Patient presents today for follow-up visit  Patient is a 69-year-old female former smoker with past medical history of dementia, AFib, CAD, CHF, CKD, COPD, stroke, HLD  She was hospitalized in May 2018 for acute hypoxemic respiratory failure thought to be related to pulmonary edema and atrial flutter  She was also diagnosed with obstructive sleep apnea and was started on CPAP  She is here today for follow up  She has been hospitalized twice since her last visit with us for CHF, renal failure, metabolic encephalopathy  She is currently feeling well  No issues with her breathing, denies any shortness of breath or cough  Her daughter states there is still difficulty with compliance with the CPAP as patient will take it off at some point during the night  On the nights when she is not wearing her CPAP she does use oxygen  Review of Systems   Constitutional: Negative  HENT: Negative  Respiratory: Negative  Cardiovascular: Negative  Gastrointestinal: Negative  Genitourinary: Negative  Musculoskeletal: Negative  Skin: Negative  Allergic/Immunologic: Negative  Neurological: Negative  Psychiatric/Behavioral: Negative  Smoking history: She reports that she quit smoking about 12 years ago  She has a 200 00 pack-year smoking history  She has never used smokeless tobacco     The following portions of the patient's history were reviewed and updated as appropriate: allergies, current medications, past family history, past medical history, past social history, past surgical history and problem list       There is no immunization history on file for this patient    Current Outpatient Medications   Medication Sig Dispense Refill    Acetaminophen 325 MG CAPS Take by mouth      albuterol (PROVENTIL HFA,VENTOLIN HFA) 90 mcg/act inhaler Inhale 2 puffs every 6 (six) hours as needed for wheezing 1 Inhaler 0    atorvastatin (LIPITOR) 10 mg tablet Take 1 tablet (10 mg total) by mouth daily 30 tablet 0    betamethasone dipropionate (DIPROSONE) 0 05 % cream Apply topically 3 (three) times a day      buPROPion (WELLBUTRIN SR) 150 mg 12 hr tablet Take 1 tablet (150 mg total) by mouth daily 30 tablet 0    carBAMazepine (TEGretol) 100 mg chewable tablet Chew 1 tablet (100 mg total) 3 (three) times a day 90 tablet 0    Cholecalciferol (VITAMIN D) 2000 units CAPS Take 1 capsule (2,000 Units total) by mouth daily 14 capsule 0    Citric Ng-Mjvfdiecujv-Av Carb (RENACIDIN) SOLN Indications: 60ml to irrigate baca once a week on Friday      clopidogrel (PLAVIX) 75 mg tablet Take 1 tablet (75 mg total) by mouth daily 90 tablet 0    digoxin (LANOXIN) 0 125 mg tablet Take 125 mcg by mouth daily      divalproex sodium (DEPAKOTE ER) 250 mg 24 hr tablet Take 1 tablet (250 mg total) by mouth 2 (two) times a day      fenofibrate micronized (LOFIBRA) 67 MG capsule Take 1 capsule (67 mg total) by mouth daily with breakfast 15 capsule 0    ferrous sulfate 325 (65 Fe) mg tablet Take 1 tablet (325 mg total) by mouth 2 (two) times a day 30 tablet 0    fluticasone-salmeterol (ADVAIR, WIXELA) 250-50 mcg/dose inhaler Inhale 2 puffs 2 (two) times a day Rinse mouth after use        furosemide (LASIX) 40 mg tablet Take 1 tablet (40 mg total) by mouth daily 90 tablet 4    ipratropium-albuterol (DUO-NEB) 0 5-2 5 mg/3 mL nebulizer solution Take 3 mL by nebulization 4 (four) times a day      lactobacillus acidophilus Take 1 capsule by mouth daily      levothyroxine 88 mcg tablet Take 1 tablet (88 mcg total) by mouth daily 14 tablet 0    LORazepam (ATIVAN) 0 5 mg tablet Take 1 tablet (0 5 mg total) by mouth 2 (two) times a day for 5 doses 20 tablet 0    metoprolol tartrate (LOPRESSOR) 100 mg tablet Take 1 tablet (100 mg total) by mouth every 12 (twelve) hours 180 tablet 3    nitroglycerin (NITROSTAT) 0 4 mg SL tablet Place 1 tablet (0 4 mg total) under the tongue every 5 (five) minutes as needed for chest pain 90 tablet 0    potassium chloride (K-DUR,KLOR-CON) 20 mEq tablet Take 1 tablet (20 mEq total) by mouth daily 15 tablet 0    warfarin (COUMADIN) 3 mg tablet One tablet daily or as otherwise directed  30 tablet 0    clindamycin (CLEOCIN) 300 MG capsule Take 600 mg by mouth every 8 (eight) hours      fluticasone-vilanterol (BREO ELLIPTA) 100-25 mcg/inh inhaler Inhale 1 puff daily (Patient not taking: Reported on 1/16/2020) 1 Inhaler 0    ondansetron (ZOFRAN) 4 mg tablet Take 1 tablet (4 mg total) by mouth every 6 (six) hours for 10 doses (Patient not taking: Reported on 1/16/2020) 10 tablet 0    pantoprazole (PROTONIX) 40 mg tablet Take 40 mg by mouth daily at bedtime       No current facility-administered medications for this visit  Allergies: Dye [iodinated diagnostic agents]; Spiriva handihaler [tiotropium bromide monohydrate]; Ramipril; and Penicillins    Objective:  Vitals:    01/16/20 1050   BP: 120/70   Pulse: (!) 52   SpO2: 96%   Weight: 82 1 kg (181 lb)   Height: 5' 2" (1 575 m)   Oxygen Therapy  SpO2: 96 %    Wt Readings from Last 3 Encounters:   01/16/20 82 1 kg (181 lb)   01/05/20 82 4 kg (181 lb 10 5 oz)   12/26/19 84 7 kg (186 lb 11 7 oz)     Body mass index is 33 11 kg/m²  Physical Exam   Constitutional: She is oriented to person, place, and time  She appears well-developed and well-nourished  No distress  HENT:   Mouth/Throat: Oropharynx is clear and moist    Eyes: Pupils are equal, round, and reactive to light  Cardiovascular: Normal rate, regular rhythm and normal heart sounds  No murmur heard  Pulmonary/Chest: Effort normal and breath sounds normal  No accessory muscle usage  No respiratory distress  She has no decreased breath sounds  She has no wheezes  She has no rhonchi  She has no rales  Abdominal: Soft   There is no tenderness  Musculoskeletal: Normal range of motion  Neurological: She is alert and oriented to person, place, and time  Skin: Skin is warm and dry  No rash noted  Psychiatric: She has a normal mood and affect  Lab Review:   Lab Results   Component Value Date    K 4 0 01/05/2020     01/05/2020    CO2 30 01/05/2020    BUN 25 01/05/2020    CREATININE 1 52 (H) 01/05/2020    CALCIUM 9 3 01/05/2020     Lab Results   Component Value Date    WBC 7 42 01/05/2020    HGB 12 0 01/05/2020    HCT 37 7 01/05/2020     (H) 01/05/2020     01/05/2020       Diagnostics:  I have personally reviewed pertinent reports  and I have personally reviewed pertinent films in PACS  Reviewed hospital imaging  Office Spirometry Results:     ESS:      Xr Chest 2 Views    Result Date: 12/24/2019  Narrative: CHEST INDICATION:   hypoxia  COMPARISON:  Chest radiograph from 10/3/2019  EXAM PERFORMED/VIEWS:  XR CHEST PA & LATERAL FINDINGS: Mild cardiomegaly with coronary stents  The lungs are clear  No pneumothorax or pleural effusion  Osseous structures appear within normal limits for patient age  Impression: No acute cardiopulmonary disease   Workstation performed: [id number]

## 2020-02-27 ENCOUNTER — TELEPHONE (OUTPATIENT)
Dept: CARDIOLOGY CLINIC | Facility: CLINIC | Age: 75
End: 2020-02-27

## 2020-02-27 NOTE — TELEPHONE ENCOUNTER
Please call the patient and daughter and ask them to increase furosemide to 80 mg for three days, then decrease back to 40 mg daily      [address]

## 2020-02-27 NOTE — TELEPHONE ENCOUNTER
Pt's daughter called and stated that pt is retaining water  Pt weighed last week 179 this week she is 185  Pt's legs are swollen and she would like to know can pt's Lasix be increased?  Please cb Deidre at [phone]

## 2020-02-28 NOTE — TELEPHONE ENCOUNTER
Patient daughter returned phone call she stated [facility] will need order faxed to [phone] Attn: Nani or Jad  Order faxed

## 2020-02-28 NOTE — TELEPHONE ENCOUNTER
lmom for pt daughter with message from [address] [place] [address] regarding pt lasix increase

## 2020-04-16 ENCOUNTER — TELEPHONE (OUTPATIENT)
Dept: OTHER | Facility: OTHER | Age: 75
End: 2020-04-16

## 2020-04-16 DIAGNOSIS — I50.33 ACUTE ON CHRONIC DIASTOLIC CONGESTIVE HEART FAILURE (HCC): ICD-10-CM

## 2020-04-16 RX ORDER — FUROSEMIDE 40 MG/1
40 TABLET ORAL 2 TIMES DAILY
Qty: 60 TABLET | Refills: 4 | Status: ON HOLD | OUTPATIENT
Start: 2020-04-16 | End: 2021-01-04 | Stop reason: SDUPTHER

## 2020-04-17 ENCOUNTER — TELEPHONE (OUTPATIENT)
Dept: CARDIOLOGY CLINIC | Facility: CLINIC | Age: 75
End: 2020-04-17

## 2020-04-22 ENCOUNTER — TELEMEDICINE (OUTPATIENT)
Dept: CARDIOLOGY CLINIC | Facility: CLINIC | Age: 75
End: 2020-04-22
Payer: MEDICARE

## 2020-04-22 VITALS — HEART RATE: 56 BPM | DIASTOLIC BLOOD PRESSURE: 88 MMHG | TEMPERATURE: 98.5 F | SYSTOLIC BLOOD PRESSURE: 140 MMHG

## 2020-04-22 DIAGNOSIS — I10 ESSENTIAL HYPERTENSION: ICD-10-CM

## 2020-04-22 DIAGNOSIS — I48.3 TYPICAL ATRIAL FLUTTER (HCC): ICD-10-CM

## 2020-04-22 DIAGNOSIS — F02.81 DEMENTIA ASSOCIATED WITH OTHER UNDERLYING DISEASE WITH BEHAVIORAL DISTURBANCE (HCC): ICD-10-CM

## 2020-04-22 DIAGNOSIS — E78.49 OTHER HYPERLIPIDEMIA: ICD-10-CM

## 2020-04-22 DIAGNOSIS — I73.9 PAD (PERIPHERAL ARTERY DISEASE) (HCC): ICD-10-CM

## 2020-04-22 DIAGNOSIS — N18.30 STAGE 3 CHRONIC KIDNEY DISEASE (HCC): Chronic | ICD-10-CM

## 2020-04-22 DIAGNOSIS — I48.0 PAROXYSMAL ATRIAL FIBRILLATION (HCC): Primary | ICD-10-CM

## 2020-04-22 DIAGNOSIS — I25.10 CORONARY ARTERY DISEASE INVOLVING NATIVE CORONARY ARTERY OF NATIVE HEART WITHOUT ANGINA PECTORIS: ICD-10-CM

## 2020-04-22 PROCEDURE — 99214 OFFICE O/P EST MOD 30 MIN: CPT | Performed by: INTERNAL MEDICINE

## 2020-04-28 ENCOUNTER — TELEPHONE (OUTPATIENT)
Dept: CARDIOLOGY CLINIC | Facility: CLINIC | Age: 75
End: 2020-04-28

## 2020-05-04 ENCOUNTER — TELEPHONE (OUTPATIENT)
Dept: VASCULAR SURGERY | Facility: CLINIC | Age: 75
End: 2020-05-04

## 2020-05-21 RX ORDER — DIVALPROEX SODIUM 250 MG/1
250 TABLET, DELAYED RELEASE ORAL EVERY 12 HOURS SCHEDULED
COMMUNITY
Start: 2020-05-08

## 2020-05-21 RX ORDER — LEVOTHYROXINE SODIUM 0.1 MG/1
TABLET ORAL
Status: ON HOLD | COMMUNITY
Start: 2020-05-08 | End: 2021-06-25 | Stop reason: SDUPTHER

## 2020-05-21 RX ORDER — ALLOPURINOL 300 MG/1
TABLET ORAL
COMMUNITY
Start: 2020-05-08 | End: 2021-06-25 | Stop reason: HOSPADM

## 2020-05-21 RX ORDER — FAMOTIDINE 20 MG/1
TABLET, FILM COATED ORAL
COMMUNITY
Start: 2020-05-08 | End: 2021-06-25 | Stop reason: HOSPADM

## 2020-05-21 RX ORDER — WARFARIN SODIUM 2.5 MG/1
TABLET ORAL
Status: ON HOLD | COMMUNITY
Start: 2020-05-08 | End: 2020-12-30

## 2020-05-21 RX ORDER — WARFARIN SODIUM 1 MG/1
TABLET ORAL
Status: ON HOLD | COMMUNITY
Start: 2020-05-08 | End: 2020-12-30

## 2020-05-22 ENCOUNTER — TELEMEDICINE (OUTPATIENT)
Dept: VASCULAR SURGERY | Facility: CLINIC | Age: 75
End: 2020-05-22
Payer: MEDICARE

## 2020-05-22 VITALS — TEMPERATURE: 98.1 F

## 2020-05-22 DIAGNOSIS — I74.09 AORTOILIAC OCCLUSIVE DISEASE (HCC): Primary | ICD-10-CM

## 2020-05-22 DIAGNOSIS — I73.9 PAD (PERIPHERAL ARTERY DISEASE) (HCC): ICD-10-CM

## 2020-05-22 DIAGNOSIS — R26.2 AMBULATORY DYSFUNCTION: ICD-10-CM

## 2020-05-22 PROCEDURE — 99214 OFFICE O/P EST MOD 30 MIN: CPT | Performed by: SURGERY

## 2020-05-22 RX ORDER — LORAZEPAM 0.5 MG/1
0.5 TABLET ORAL
Status: ON HOLD | COMMUNITY
Start: 2020-05-16 | End: 2021-06-25 | Stop reason: SDUPTHER

## 2020-06-01 ENCOUNTER — HOSPITAL ENCOUNTER (EMERGENCY)
Facility: HOSPITAL | Age: 75
Discharge: HOME | End: 2020-06-01
Attending: EMERGENCY MEDICINE | Admitting: EMERGENCY MEDICINE
Payer: MEDICARE

## 2020-06-01 ENCOUNTER — APPOINTMENT (EMERGENCY)
Dept: CT IMAGING | Facility: HOSPITAL | Age: 75
End: 2020-06-01
Payer: MEDICARE

## 2020-06-01 VITALS
SYSTOLIC BLOOD PRESSURE: 171 MMHG | RESPIRATION RATE: 21 BRPM | TEMPERATURE: 98.3 F | HEART RATE: 48 BPM | WEIGHT: 180.56 LBS | OXYGEN SATURATION: 93 % | DIASTOLIC BLOOD PRESSURE: 75 MMHG | BODY MASS INDEX: 33.02 KG/M2
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
## 2020-06-01 DIAGNOSIS — M54.2 NECK PAIN: ICD-10-CM

## 2020-06-01 DIAGNOSIS — S09.90XA INJURY OF HEAD, INITIAL ENCOUNTER: ICD-10-CM

## 2020-06-01 LAB
ALBUMIN SERPL BCP-MCNC: 3.3 G/DL (ref 3.5–5)
ALP SERPL-CCNC: 79 U/L (ref 46–116)
ALT SERPL W P-5'-P-CCNC: 32 U/L (ref 12–78)
ANION GAP SERPL CALCULATED.3IONS-SCNC: 9 MMOL/L (ref 4–13)
APTT PPP: 37 SECONDS (ref 23–37)
AST SERPL W P-5'-P-CCNC: 22 U/L (ref 5–45)
ATRIAL RATE: 50 BPM
BASOPHILS # BLD AUTO: 0.03 THOUSANDS/ΜL (ref 0–0.1)
BASOPHILS NFR BLD AUTO: 1 % (ref 0–1)
BILIRUB SERPL-MCNC: 0.3 MG/DL (ref 0.2–1)
BUN SERPL-MCNC: 28 MG/DL (ref 5–25)
CALCIUM SERPL-MCNC: 9 MG/DL (ref 8.3–10.1)
CHLORIDE SERPL-SCNC: 110 MMOL/L (ref 100–108)
CO2 SERPL-SCNC: 30 MMOL/L (ref 21–32)
CREAT SERPL-MCNC: 1.42 MG/DL (ref 0.6–1.3)
EOSINOPHIL # BLD AUTO: 0.18 THOUSAND/ΜL (ref 0–0.61)
EOSINOPHIL NFR BLD AUTO: 3 % (ref 0–6)
ERYTHROCYTE [DISTWIDTH] IN BLOOD BY AUTOMATED COUNT: 13.6 % (ref 11.6–15.1)
GFR SERPL CREATININE-BSD FRML MDRD: 37 ML/MIN/1.73SQ M
GLUCOSE SERPL-MCNC: 190 MG/DL (ref 65–140)
HCT VFR BLD AUTO: 35.3 % (ref 34.8–46.1)
HGB BLD-MCNC: 11.3 G/DL (ref 11.5–15.4)
IMM GRANULOCYTES # BLD AUTO: 0.06 THOUSAND/UL (ref 0–0.2)
IMM GRANULOCYTES NFR BLD AUTO: 1 % (ref 0–2)
INR PPP: 2.24 (ref 0.84–1.19)
LYMPHOCYTES # BLD AUTO: 1.5 THOUSANDS/ΜL (ref 0.6–4.47)
LYMPHOCYTES NFR BLD AUTO: 28 % (ref 14–44)
MCH RBC QN AUTO: 33.3 PG (ref 26.8–34.3)
MCHC RBC AUTO-ENTMCNC: 32 G/DL (ref 31.4–37.4)
MCV RBC AUTO: 104 FL (ref 82–98)
MONOCYTES # BLD AUTO: 0.66 THOUSAND/ΜL (ref 0.17–1.22)
MONOCYTES NFR BLD AUTO: 13 % (ref 4–12)
NEUTROPHILS # BLD AUTO: 2.85 THOUSANDS/ΜL (ref 1.85–7.62)
NEUTS SEG NFR BLD AUTO: 54 % (ref 43–75)
NRBC BLD AUTO-RTO: 0 /100 WBCS
P AXIS: 55 DEGREES
PLATELET # BLD AUTO: 152 THOUSANDS/UL (ref 149–390)
PMV BLD AUTO: 12.5 FL (ref 8.9–12.7)
POTASSIUM SERPL-SCNC: 3.7 MMOL/L (ref 3.5–5.3)
PR INTERVAL: 206 MS
PROT SERPL-MCNC: 7 G/DL (ref 6.4–8.2)
PROTHROMBIN TIME: 25.1 SECONDS (ref 11.6–14.5)
QRS AXIS: -19 DEGREES
QRSD INTERVAL: 120 MS
QT INTERVAL: 458 MS
QTC INTERVAL: 417 MS
RBC # BLD AUTO: 3.39 MILLION/UL (ref 3.81–5.12)
SODIUM SERPL-SCNC: 149 MMOL/L (ref 136–145)
T WAVE AXIS: 140 DEGREES
TROPONIN I SERPL-MCNC: 0.04 NG/ML
VENTRICULAR RATE: 50 BPM
WBC # BLD AUTO: 5.28 THOUSAND/UL (ref 4.31–10.16)

## 2020-06-01 PROCEDURE — 80053 COMPREHEN METABOLIC PANEL: CPT | Performed by: EMERGENCY MEDICINE

## 2020-06-01 PROCEDURE — 85025 COMPLETE CBC W/AUTO DIFF WBC: CPT | Performed by: EMERGENCY MEDICINE

## 2020-06-01 PROCEDURE — 93005 ELECTROCARDIOGRAM TRACING: CPT

## 2020-06-01 PROCEDURE — 85610 PROTHROMBIN TIME: CPT | Performed by: EMERGENCY MEDICINE

## 2020-06-01 PROCEDURE — 36415 COLL VENOUS BLD VENIPUNCTURE: CPT | Performed by: EMERGENCY MEDICINE

## 2020-06-01 PROCEDURE — 99284 EMERGENCY DEPT VISIT MOD MDM: CPT | Performed by: EMERGENCY MEDICINE

## 2020-06-01 PROCEDURE — 84484 ASSAY OF TROPONIN QUANT: CPT | Performed by: EMERGENCY MEDICINE

## 2020-06-01 PROCEDURE — 85730 THROMBOPLASTIN TIME PARTIAL: CPT | Performed by: EMERGENCY MEDICINE

## 2020-06-01 PROCEDURE — 72125 CT NECK SPINE W/O DYE: CPT

## 2020-06-01 PROCEDURE — 99284 EMERGENCY DEPT VISIT MOD MDM: CPT

## 2020-06-01 PROCEDURE — 70450 CT HEAD/BRAIN W/O DYE: CPT

## 2020-06-01 PROCEDURE — 93010 ELECTROCARDIOGRAM REPORT: CPT | Performed by: INTERNAL MEDICINE

## 2020-06-04 ENCOUNTER — TELEPHONE (OUTPATIENT)
Dept: CARDIOLOGY CLINIC | Facility: CLINIC | Age: 75
End: 2020-06-04

## 2020-06-04 ENCOUNTER — TELEPHONE (OUTPATIENT)
Dept: VASCULAR SURGERY | Facility: CLINIC | Age: 75
End: 2020-06-04

## 2020-06-04 NOTE — TELEPHONE ENCOUNTER
Noted, thank you  Please return her call and express our collective sadness      [address]

## 2020-08-18 DIAGNOSIS — J41.0 SIMPLE CHRONIC BRONCHITIS (HCC): Primary | ICD-10-CM

## 2020-12-28 ENCOUNTER — APPOINTMENT (EMERGENCY)
Dept: RADIOLOGY | Facility: HOSPITAL | Age: 75
DRG: 177 | End: 2020-12-28
Payer: MEDICARE

## 2020-12-28 ENCOUNTER — HOSPITAL ENCOUNTER (INPATIENT)
Facility: HOSPITAL | Age: 75
LOS: 1 days | DRG: 177 | End: 2020-12-29
Attending: EMERGENCY MEDICINE | Admitting: INTERNAL MEDICINE
Payer: MEDICARE

## 2020-12-28 DIAGNOSIS — U07.1 COVID-19: Primary | ICD-10-CM

## 2020-12-28 DIAGNOSIS — R09.02 HYPOXIA: ICD-10-CM

## 2020-12-28 DIAGNOSIS — R50.9 FEVER: ICD-10-CM

## 2020-12-28 LAB
ALBUMIN SERPL BCP-MCNC: 3 G/DL (ref 3.5–5)
ALP SERPL-CCNC: 84 U/L (ref 46–116)
ALT SERPL W P-5'-P-CCNC: 25 U/L (ref 12–78)
ANION GAP SERPL CALCULATED.3IONS-SCNC: 10 MMOL/L (ref 4–13)
AST SERPL W P-5'-P-CCNC: 37 U/L (ref 5–45)
ATRIAL RATE: 62 BPM
BACTERIA UR QL AUTO: ABNORMAL /HPF
BASOPHILS # BLD AUTO: 0.01 THOUSANDS/ΜL (ref 0–0.1)
BASOPHILS NFR BLD AUTO: 0 % (ref 0–1)
BILIRUB DIRECT SERPL-MCNC: 0.31 MG/DL (ref 0–0.2)
BILIRUB SERPL-MCNC: 0.5 MG/DL (ref 0.2–1)
BILIRUB UR QL STRIP: NEGATIVE
BUN SERPL-MCNC: 29 MG/DL (ref 5–25)
CALCIUM SERPL-MCNC: 9.2 MG/DL (ref 8.3–10.1)
CHLORIDE SERPL-SCNC: 106 MMOL/L (ref 100–108)
CLARITY UR: ABNORMAL
CO2 SERPL-SCNC: 31 MMOL/L (ref 21–32)
COLOR UR: YELLOW
CREAT SERPL-MCNC: 1.73 MG/DL (ref 0.6–1.3)
D DIMER PPP FEU-MCNC: 1.02 UG/ML FEU
EOSINOPHIL # BLD AUTO: 0.01 THOUSAND/ΜL (ref 0–0.61)
EOSINOPHIL NFR BLD AUTO: 0 % (ref 0–6)
ERYTHROCYTE [DISTWIDTH] IN BLOOD BY AUTOMATED COUNT: 13.7 % (ref 11.6–15.1)
GFR SERPL CREATININE-BSD FRML MDRD: 29 ML/MIN/1.73SQ M
GLUCOSE SERPL-MCNC: 118 MG/DL (ref 65–140)
GLUCOSE UR STRIP-MCNC: NEGATIVE MG/DL
HCT VFR BLD AUTO: 36.9 % (ref 34.8–46.1)
HGB BLD-MCNC: 11.8 G/DL (ref 11.5–15.4)
HGB UR QL STRIP.AUTO: ABNORMAL
IMM GRANULOCYTES # BLD AUTO: 0.02 THOUSAND/UL (ref 0–0.2)
IMM GRANULOCYTES NFR BLD AUTO: 1 % (ref 0–2)
KETONES UR STRIP-MCNC: NEGATIVE MG/DL
LEUKOCYTE ESTERASE UR QL STRIP: ABNORMAL
LYMPHOCYTES # BLD AUTO: 1.11 THOUSANDS/ΜL (ref 0.6–4.47)
LYMPHOCYTES NFR BLD AUTO: 42 % (ref 14–44)
MCH RBC QN AUTO: 32.5 PG (ref 26.8–34.3)
MCHC RBC AUTO-ENTMCNC: 32 G/DL (ref 31.4–37.4)
MCV RBC AUTO: 102 FL (ref 82–98)
MONOCYTES # BLD AUTO: 0.42 THOUSAND/ΜL (ref 0.17–1.22)
MONOCYTES NFR BLD AUTO: 16 % (ref 4–12)
NEUTROPHILS # BLD AUTO: 1.07 THOUSANDS/ΜL (ref 1.85–7.62)
NEUTS SEG NFR BLD AUTO: 41 % (ref 43–75)
NITRITE UR QL STRIP: POSITIVE
NON-SQ EPI CELLS URNS QL MICRO: ABNORMAL /HPF
NRBC BLD AUTO-RTO: 0 /100 WBCS
NT-PROBNP SERPL-MCNC: 1989 PG/ML
P AXIS: 36 DEGREES
PH UR STRIP.AUTO: 5.5 [PH]
PLATELET # BLD AUTO: 98 THOUSANDS/UL (ref 149–390)
PMV BLD AUTO: 11.7 FL (ref 8.9–12.7)
POTASSIUM SERPL-SCNC: 3.9 MMOL/L (ref 3.5–5.3)
PR INTERVAL: 178 MS
PROT SERPL-MCNC: 7.5 G/DL (ref 6.4–8.2)
PROT UR STRIP-MCNC: ABNORMAL MG/DL
QRS AXIS: -24 DEGREES
QRSD INTERVAL: 92 MS
QT INTERVAL: 440 MS
QTC INTERVAL: 446 MS
RBC # BLD AUTO: 3.63 MILLION/UL (ref 3.81–5.12)
RBC #/AREA URNS AUTO: ABNORMAL /HPF
SODIUM SERPL-SCNC: 147 MMOL/L (ref 136–145)
SP GR UR STRIP.AUTO: 1.02 (ref 1–1.03)
T WAVE AXIS: 122 DEGREES
TROPONIN I SERPL-MCNC: 0.05 NG/ML
UROBILINOGEN UR QL STRIP.AUTO: 1 E.U./DL
VENTRICULAR RATE: 62 BPM
WBC # BLD AUTO: 2.64 THOUSAND/UL (ref 4.31–10.16)
WBC #/AREA URNS AUTO: ABNORMAL /HPF

## 2020-12-28 PROCEDURE — 36415 COLL VENOUS BLD VENIPUNCTURE: CPT | Performed by: EMERGENCY MEDICINE

## 2020-12-28 PROCEDURE — 96360 HYDRATION IV INFUSION INIT: CPT

## 2020-12-28 PROCEDURE — 80076 HEPATIC FUNCTION PANEL: CPT | Performed by: EMERGENCY MEDICINE

## 2020-12-28 PROCEDURE — 81001 URINALYSIS AUTO W/SCOPE: CPT | Performed by: EMERGENCY MEDICINE

## 2020-12-28 PROCEDURE — 83880 ASSAY OF NATRIURETIC PEPTIDE: CPT | Performed by: EMERGENCY MEDICINE

## 2020-12-28 PROCEDURE — 71045 X-RAY EXAM CHEST 1 VIEW: CPT

## 2020-12-28 PROCEDURE — 99291 CRITICAL CARE FIRST HOUR: CPT | Performed by: EMERGENCY MEDICINE

## 2020-12-28 PROCEDURE — 99285 EMERGENCY DEPT VISIT HI MDM: CPT

## 2020-12-28 PROCEDURE — 80048 BASIC METABOLIC PNL TOTAL CA: CPT | Performed by: EMERGENCY MEDICINE

## 2020-12-28 PROCEDURE — 1123F ACP DISCUSS/DSCN MKR DOCD: CPT | Performed by: EMERGENCY MEDICINE

## 2020-12-28 PROCEDURE — 93005 ELECTROCARDIOGRAM TRACING: CPT

## 2020-12-28 PROCEDURE — 85379 FIBRIN DEGRADATION QUANT: CPT | Performed by: EMERGENCY MEDICINE

## 2020-12-28 PROCEDURE — 93010 ELECTROCARDIOGRAM REPORT: CPT | Performed by: INTERNAL MEDICINE

## 2020-12-28 PROCEDURE — 84484 ASSAY OF TROPONIN QUANT: CPT | Performed by: EMERGENCY MEDICINE

## 2020-12-28 PROCEDURE — 85025 COMPLETE CBC W/AUTO DIFF WBC: CPT | Performed by: EMERGENCY MEDICINE

## 2020-12-28 RX ORDER — HALOPERIDOL 1 MG/1
1 TABLET ORAL AS NEEDED
COMMUNITY
End: 2021-06-25 | Stop reason: HOSPADM

## 2020-12-28 RX ORDER — POLYETHYLENE GLYCOL 3350 17 G/17G
17 POWDER, FOR SOLUTION ORAL DAILY
COMMUNITY
End: 2021-06-25 | Stop reason: HOSPADM

## 2020-12-28 RX ORDER — SENNA PLUS 8.6 MG/1
1 TABLET ORAL DAILY
COMMUNITY

## 2020-12-28 RX ORDER — PROCHLORPERAZINE MALEATE 10 MG
10 TABLET ORAL EVERY 6 HOURS PRN
COMMUNITY
End: 2021-06-25 | Stop reason: HOSPADM

## 2020-12-28 RX ORDER — HYDROMORPHONE HYDROCHLORIDE 1 MG/ML
2.5 SOLUTION ORAL
COMMUNITY
End: 2021-06-25 | Stop reason: HOSPADM

## 2020-12-28 RX ORDER — ASPIRIN 81 MG/1
81 TABLET, CHEWABLE ORAL DAILY
COMMUNITY
End: 2021-06-25 | Stop reason: HOSPADM

## 2020-12-28 RX ORDER — LANOLIN ALCOHOL/MO/W.PET/CERES
6 CREAM (GRAM) TOPICAL
COMMUNITY

## 2020-12-28 RX ORDER — NYSTATIN 100000 [USP'U]/G
POWDER TOPICAL AS NEEDED
COMMUNITY
End: 2021-06-25 | Stop reason: HOSPADM

## 2020-12-28 RX ORDER — BISACODYL 10 MG
10 SUPPOSITORY, RECTAL RECTAL DAILY PRN
COMMUNITY
End: 2021-06-25 | Stop reason: HOSPADM

## 2020-12-28 RX ADMIN — SODIUM CHLORIDE 1000 ML: 0.9 INJECTION, SOLUTION INTRAVENOUS at 21:46

## 2020-12-29 ENCOUNTER — HOSPITAL ENCOUNTER (INPATIENT)
Facility: HOSPITAL | Age: 75
LOS: 6 days | Discharge: HOSPICE | DRG: 871 | End: 2021-01-04
Attending: INTERNAL MEDICINE | Admitting: INTERNAL MEDICINE
Payer: MEDICARE

## 2020-12-29 VITALS
HEART RATE: 47 BPM | RESPIRATION RATE: 22 BRPM | BODY MASS INDEX: 32.02 KG/M2 | OXYGEN SATURATION: 97 % | TEMPERATURE: 98.4 F | WEIGHT: 175.04 LBS | DIASTOLIC BLOOD PRESSURE: 72 MMHG | SYSTOLIC BLOOD PRESSURE: 164 MMHG

## 2020-12-29 DIAGNOSIS — N39.0 URINARY TRACT INFECTION ASSOCIATED WITH INDWELLING URETHRAL CATHETER, INITIAL ENCOUNTER (HCC): Primary | ICD-10-CM

## 2020-12-29 DIAGNOSIS — I50.33 ACUTE ON CHRONIC DIASTOLIC CONGESTIVE HEART FAILURE (HCC): ICD-10-CM

## 2020-12-29 DIAGNOSIS — E03.9 HYPOTHYROIDISM, UNSPECIFIED TYPE: ICD-10-CM

## 2020-12-29 DIAGNOSIS — R78.81 GRAM-NEGATIVE BACTEREMIA: ICD-10-CM

## 2020-12-29 DIAGNOSIS — U07.1 COVID-19: ICD-10-CM

## 2020-12-29 DIAGNOSIS — T83.511A URINARY TRACT INFECTION ASSOCIATED WITH INDWELLING URETHRAL CATHETER, INITIAL ENCOUNTER (HCC): Primary | ICD-10-CM

## 2020-12-29 PROBLEM — J12.82 PNEUMONIA DUE TO COVID-19 VIRUS: Status: ACTIVE | Noted: 2020-12-29

## 2020-12-29 PROBLEM — R09.02 HYPOXIA: Status: ACTIVE | Noted: 2020-12-29

## 2020-12-29 LAB
ABO GROUP BLD: NORMAL
ALBUMIN SERPL BCP-MCNC: 2.9 G/DL (ref 3.5–5)
ALP SERPL-CCNC: 78 U/L (ref 46–116)
ALT SERPL W P-5'-P-CCNC: 24 U/L (ref 12–78)
ANION GAP SERPL CALCULATED.3IONS-SCNC: 11 MMOL/L (ref 4–13)
AST SERPL W P-5'-P-CCNC: 36 U/L (ref 5–45)
BASOPHILS # BLD AUTO: 0.01 THOUSANDS/ΜL (ref 0–0.1)
BASOPHILS NFR BLD AUTO: 0 % (ref 0–1)
BILIRUB SERPL-MCNC: 0.4 MG/DL (ref 0.2–1)
BUN SERPL-MCNC: 29 MG/DL (ref 5–25)
CALCIUM ALBUM COR SERPL-MCNC: 9.6 MG/DL (ref 8.3–10.1)
CALCIUM SERPL-MCNC: 8.7 MG/DL (ref 8.3–10.1)
CHLORIDE SERPL-SCNC: 107 MMOL/L (ref 100–108)
CHOLEST SERPL-MCNC: 178 MG/DL (ref 50–200)
CO2 SERPL-SCNC: 28 MMOL/L (ref 21–32)
CREAT SERPL-MCNC: 1.67 MG/DL (ref 0.6–1.3)
CRP SERPL QL: 55.1 MG/L
DIGOXIN SERPL-MCNC: 0.4 NG/ML (ref 0.8–2)
EOSINOPHIL # BLD AUTO: 0.01 THOUSAND/ΜL (ref 0–0.61)
EOSINOPHIL NFR BLD AUTO: 0 % (ref 0–6)
ERYTHROCYTE [DISTWIDTH] IN BLOOD BY AUTOMATED COUNT: 13.7 % (ref 11.6–15.1)
FERRITIN SERPL-MCNC: 1171 NG/ML (ref 8–388)
FLUAV RNA RESP QL NAA+PROBE: NEGATIVE
FLUBV RNA RESP QL NAA+PROBE: NEGATIVE
GFR SERPL CREATININE-BSD FRML MDRD: 30 ML/MIN/1.73SQ M
GLUCOSE SERPL-MCNC: 163 MG/DL (ref 65–140)
HBV CORE AB SER QL: NORMAL
HBV CORE IGM SER QL: NORMAL
HBV SURFACE AG SER QL: NORMAL
HCT VFR BLD AUTO: 36.8 % (ref 34.8–46.1)
HCV AB SER QL: NORMAL
HDLC SERPL-MCNC: 25 MG/DL
HGB BLD-MCNC: 11.5 G/DL (ref 11.5–15.4)
IMM GRANULOCYTES # BLD AUTO: 0.03 THOUSAND/UL (ref 0–0.2)
IMM GRANULOCYTES NFR BLD AUTO: 1 % (ref 0–2)
LDLC SERPL CALC-MCNC: 103 MG/DL (ref 0–100)
LYMPHOCYTES # BLD AUTO: 1.43 THOUSANDS/ΜL (ref 0.6–4.47)
LYMPHOCYTES NFR BLD AUTO: 53 % (ref 14–44)
MAGNESIUM SERPL-MCNC: 2 MG/DL (ref 1.6–2.6)
MCH RBC QN AUTO: 32.5 PG (ref 26.8–34.3)
MCHC RBC AUTO-ENTMCNC: 31.3 G/DL (ref 31.4–37.4)
MCV RBC AUTO: 104 FL (ref 82–98)
MONOCYTES # BLD AUTO: 0.36 THOUSAND/ΜL (ref 0.17–1.22)
MONOCYTES NFR BLD AUTO: 13 % (ref 4–12)
NEUTROPHILS # BLD AUTO: 0.91 THOUSANDS/ΜL (ref 1.85–7.62)
NEUTS SEG NFR BLD AUTO: 33 % (ref 43–75)
NRBC BLD AUTO-RTO: 0 /100 WBCS
PLATELET # BLD AUTO: 87 THOUSANDS/UL (ref 149–390)
PMV BLD AUTO: 11.8 FL (ref 8.9–12.7)
POTASSIUM SERPL-SCNC: 3.8 MMOL/L (ref 3.5–5.3)
PROCALCITONIN SERPL-MCNC: 0.08 NG/ML
PROT SERPL-MCNC: 7.3 G/DL (ref 6.4–8.2)
RBC # BLD AUTO: 3.54 MILLION/UL (ref 3.81–5.12)
RH BLD: POSITIVE
RSV RNA RESP QL NAA+PROBE: NEGATIVE
SARS-COV-2 RNA RESP QL NAA+PROBE: POSITIVE
SODIUM SERPL-SCNC: 146 MMOL/L (ref 136–145)
T4 FREE SERPL-MCNC: 0.7 NG/DL (ref 0.76–1.46)
TRIGL SERPL-MCNC: 251 MG/DL
TROPONIN I SERPL-MCNC: 0.06 NG/ML
TROPONIN I SERPL-MCNC: 0.06 NG/ML
TSH SERPL DL<=0.05 MIU/L-ACNC: 16.99 UIU/ML (ref 0.36–3.74)
WBC # BLD AUTO: 2.75 THOUSAND/UL (ref 4.31–10.16)

## 2020-12-29 PROCEDURE — 85025 COMPLETE CBC W/AUTO DIFF WBC: CPT | Performed by: PHYSICIAN ASSISTANT

## 2020-12-29 PROCEDURE — 86140 C-REACTIVE PROTEIN: CPT | Performed by: PHYSICIAN ASSISTANT

## 2020-12-29 PROCEDURE — 92610 EVALUATE SWALLOWING FUNCTION: CPT

## 2020-12-29 PROCEDURE — 99223 1ST HOSP IP/OBS HIGH 75: CPT | Performed by: INTERNAL MEDICINE

## 2020-12-29 PROCEDURE — 87040 BLOOD CULTURE FOR BACTERIA: CPT | Performed by: PHYSICIAN ASSISTANT

## 2020-12-29 PROCEDURE — 84443 ASSAY THYROID STIM HORMONE: CPT | Performed by: PHYSICIAN ASSISTANT

## 2020-12-29 PROCEDURE — 87081 CULTURE SCREEN ONLY: CPT | Performed by: PHYSICIAN ASSISTANT

## 2020-12-29 PROCEDURE — 87077 CULTURE AEROBIC IDENTIFY: CPT | Performed by: PHYSICIAN ASSISTANT

## 2020-12-29 PROCEDURE — 87340 HEPATITIS B SURFACE AG IA: CPT | Performed by: PHYSICIAN ASSISTANT

## 2020-12-29 PROCEDURE — 80162 ASSAY OF DIGOXIN TOTAL: CPT | Performed by: INTERNAL MEDICINE

## 2020-12-29 PROCEDURE — 87186 SC STD MICRODIL/AGAR DIL: CPT | Performed by: PHYSICIAN ASSISTANT

## 2020-12-29 PROCEDURE — 86705 HEP B CORE ANTIBODY IGM: CPT | Performed by: PHYSICIAN ASSISTANT

## 2020-12-29 PROCEDURE — 36415 COLL VENOUS BLD VENIPUNCTURE: CPT | Performed by: PHYSICIAN ASSISTANT

## 2020-12-29 PROCEDURE — 84439 ASSAY OF FREE THYROXINE: CPT | Performed by: PHYSICIAN ASSISTANT

## 2020-12-29 PROCEDURE — 84145 PROCALCITONIN (PCT): CPT | Performed by: PHYSICIAN ASSISTANT

## 2020-12-29 PROCEDURE — 86803 HEPATITIS C AB TEST: CPT | Performed by: PHYSICIAN ASSISTANT

## 2020-12-29 PROCEDURE — 83735 ASSAY OF MAGNESIUM: CPT | Performed by: PHYSICIAN ASSISTANT

## 2020-12-29 PROCEDURE — 80053 COMPREHEN METABOLIC PANEL: CPT | Performed by: PHYSICIAN ASSISTANT

## 2020-12-29 PROCEDURE — 84484 ASSAY OF TROPONIN QUANT: CPT | Performed by: PHYSICIAN ASSISTANT

## 2020-12-29 PROCEDURE — NC001 PR NO CHARGE: Performed by: INTERNAL MEDICINE

## 2020-12-29 PROCEDURE — 82728 ASSAY OF FERRITIN: CPT | Performed by: PHYSICIAN ASSISTANT

## 2020-12-29 PROCEDURE — 86704 HEP B CORE ANTIBODY TOTAL: CPT | Performed by: PHYSICIAN ASSISTANT

## 2020-12-29 PROCEDURE — 0241U HB NFCT DS VIR RESP RNA 4 TRGT: CPT | Performed by: INTERNAL MEDICINE

## 2020-12-29 PROCEDURE — 80061 LIPID PANEL: CPT | Performed by: PHYSICIAN ASSISTANT

## 2020-12-29 RX ORDER — NYSTATIN 100000 [USP'U]/G
POWDER TOPICAL 2 TIMES DAILY
Status: CANCELLED | OUTPATIENT
Start: 2020-12-29

## 2020-12-29 RX ORDER — FLUTICASONE FUROATE AND VILANTEROL 200; 25 UG/1; UG/1
1 POWDER RESPIRATORY (INHALATION)
Status: CANCELLED | OUTPATIENT
Start: 2020-12-30

## 2020-12-29 RX ORDER — CARBAMAZEPINE 100 MG/1
100 TABLET, CHEWABLE ORAL 3 TIMES DAILY
Status: CANCELLED | OUTPATIENT
Start: 2020-12-29

## 2020-12-29 RX ORDER — BUPROPION HYDROCHLORIDE 150 MG/1
150 TABLET ORAL DAILY
Status: DISCONTINUED | OUTPATIENT
Start: 2020-12-29 | End: 2020-12-29 | Stop reason: HOSPADM

## 2020-12-29 RX ORDER — ALBUTEROL SULFATE 90 UG/1
2 AEROSOL, METERED RESPIRATORY (INHALATION) EVERY 6 HOURS PRN
Status: CANCELLED | OUTPATIENT
Start: 2020-12-29

## 2020-12-29 RX ORDER — FUROSEMIDE 40 MG/1
40 TABLET ORAL DAILY
Status: DISCONTINUED | OUTPATIENT
Start: 2020-12-29 | End: 2020-12-29 | Stop reason: HOSPADM

## 2020-12-29 RX ORDER — BISACODYL 10 MG
10 SUPPOSITORY, RECTAL RECTAL DAILY PRN
Status: CANCELLED | OUTPATIENT
Start: 2020-12-29

## 2020-12-29 RX ORDER — BUPROPION HYDROCHLORIDE 150 MG/1
150 TABLET ORAL DAILY
Status: DISCONTINUED | OUTPATIENT
Start: 2020-12-30 | End: 2021-01-04 | Stop reason: HOSPADM

## 2020-12-29 RX ORDER — ASCORBIC ACID 500 MG
1000 TABLET ORAL EVERY 12 HOURS SCHEDULED
Status: DISCONTINUED | OUTPATIENT
Start: 2020-12-29 | End: 2020-12-29 | Stop reason: HOSPADM

## 2020-12-29 RX ORDER — FAMOTIDINE 20 MG/1
10 TABLET, FILM COATED ORAL DAILY
Status: CANCELLED | OUTPATIENT
Start: 2020-12-30

## 2020-12-29 RX ORDER — ZINC SULFATE 50(220)MG
220 CAPSULE ORAL DAILY
Status: DISCONTINUED | OUTPATIENT
Start: 2020-12-29 | End: 2020-12-29 | Stop reason: HOSPADM

## 2020-12-29 RX ORDER — DIVALPROEX SODIUM 250 MG/1
250 TABLET, DELAYED RELEASE ORAL EVERY 12 HOURS SCHEDULED
Status: DISCONTINUED | OUTPATIENT
Start: 2020-12-29 | End: 2020-12-29 | Stop reason: HOSPADM

## 2020-12-29 RX ORDER — ONDANSETRON 2 MG/ML
4 INJECTION INTRAMUSCULAR; INTRAVENOUS EVERY 6 HOURS PRN
Status: DISCONTINUED | OUTPATIENT
Start: 2020-12-29 | End: 2020-12-29 | Stop reason: HOSPADM

## 2020-12-29 RX ORDER — HYDROMORPHONE HYDROCHLORIDE 2 MG/1
2 TABLET ORAL EVERY 6 HOURS PRN
Status: DISCONTINUED | OUTPATIENT
Start: 2020-12-29 | End: 2020-12-29 | Stop reason: HOSPADM

## 2020-12-29 RX ORDER — ONDANSETRON 2 MG/ML
4 INJECTION INTRAMUSCULAR; INTRAVENOUS EVERY 6 HOURS PRN
Status: CANCELLED | OUTPATIENT
Start: 2020-12-29

## 2020-12-29 RX ORDER — SENNOSIDES 8.6 MG
1 TABLET ORAL DAILY
Status: DISCONTINUED | OUTPATIENT
Start: 2020-12-29 | End: 2020-12-29 | Stop reason: HOSPADM

## 2020-12-29 RX ORDER — MELATONIN
2000 DAILY
Status: DISCONTINUED | OUTPATIENT
Start: 2020-12-29 | End: 2020-12-29 | Stop reason: HOSPADM

## 2020-12-29 RX ORDER — ALBUTEROL SULFATE 90 UG/1
2 AEROSOL, METERED RESPIRATORY (INHALATION) EVERY 6 HOURS PRN
Status: DISCONTINUED | OUTPATIENT
Start: 2020-12-29 | End: 2020-12-29 | Stop reason: HOSPADM

## 2020-12-29 RX ORDER — ALBUTEROL SULFATE 90 UG/1
2 AEROSOL, METERED RESPIRATORY (INHALATION) EVERY 6 HOURS PRN
Status: DISCONTINUED | OUTPATIENT
Start: 2020-12-29 | End: 2021-01-04 | Stop reason: HOSPADM

## 2020-12-29 RX ORDER — FUROSEMIDE 40 MG/1
40 TABLET ORAL DAILY
Status: CANCELLED | OUTPATIENT
Start: 2020-12-30

## 2020-12-29 RX ORDER — LORAZEPAM 0.5 MG/1
0.5 TABLET ORAL EVERY 6 HOURS PRN
Status: CANCELLED | OUTPATIENT
Start: 2020-12-29

## 2020-12-29 RX ORDER — METOPROLOL TARTRATE 50 MG/1
100 TABLET, FILM COATED ORAL EVERY 12 HOURS SCHEDULED
Status: DISCONTINUED | OUTPATIENT
Start: 2020-12-29 | End: 2020-12-29 | Stop reason: HOSPADM

## 2020-12-29 RX ORDER — HALOPERIDOL 1 MG/1
1 TABLET ORAL DAILY PRN
Status: DISCONTINUED | OUTPATIENT
Start: 2020-12-29 | End: 2021-01-04 | Stop reason: HOSPADM

## 2020-12-29 RX ORDER — LANOLIN ALCOHOL/MO/W.PET/CERES
6 CREAM (GRAM) TOPICAL
Status: CANCELLED | OUTPATIENT
Start: 2020-12-29

## 2020-12-29 RX ORDER — HALOPERIDOL 1 MG/1
1 TABLET ORAL DAILY PRN
Status: DISCONTINUED | OUTPATIENT
Start: 2020-12-29 | End: 2020-12-29 | Stop reason: HOSPADM

## 2020-12-29 RX ORDER — BISACODYL 10 MG
10 SUPPOSITORY, RECTAL RECTAL DAILY PRN
Status: DISCONTINUED | OUTPATIENT
Start: 2020-12-29 | End: 2021-01-04 | Stop reason: HOSPADM

## 2020-12-29 RX ORDER — NYSTATIN 100000 [USP'U]/G
POWDER TOPICAL 2 TIMES DAILY
Status: DISCONTINUED | OUTPATIENT
Start: 2020-12-29 | End: 2021-01-04 | Stop reason: HOSPADM

## 2020-12-29 RX ORDER — DEXAMETHASONE SODIUM PHOSPHATE 4 MG/ML
6 INJECTION, SOLUTION INTRA-ARTICULAR; INTRALESIONAL; INTRAMUSCULAR; INTRAVENOUS; SOFT TISSUE EVERY 24 HOURS
Status: DISCONTINUED | OUTPATIENT
Start: 2020-12-30 | End: 2021-01-04 | Stop reason: HOSPADM

## 2020-12-29 RX ORDER — HYDROMORPHONE HYDROCHLORIDE 2 MG/1
2 TABLET ORAL EVERY 6 HOURS PRN
Status: CANCELLED | OUTPATIENT
Start: 2020-12-29

## 2020-12-29 RX ORDER — ACETAMINOPHEN 325 MG/1
975 TABLET ORAL EVERY 6 HOURS PRN
Status: CANCELLED | OUTPATIENT
Start: 2020-12-29

## 2020-12-29 RX ORDER — DIVALPROEX SODIUM 250 MG/1
250 TABLET, DELAYED RELEASE ORAL EVERY 12 HOURS SCHEDULED
Status: CANCELLED | OUTPATIENT
Start: 2020-12-29

## 2020-12-29 RX ORDER — FLUTICASONE FUROATE AND VILANTEROL 200; 25 UG/1; UG/1
1 POWDER RESPIRATORY (INHALATION)
Status: DISCONTINUED | OUTPATIENT
Start: 2020-12-30 | End: 2021-01-04 | Stop reason: HOSPADM

## 2020-12-29 RX ORDER — CARBAMAZEPINE 100 MG/1
100 TABLET, CHEWABLE ORAL 3 TIMES DAILY
Status: DISCONTINUED | OUTPATIENT
Start: 2020-12-29 | End: 2021-01-04 | Stop reason: HOSPADM

## 2020-12-29 RX ORDER — MULTIVITAMIN/IRON/FOLIC ACID 18MG-0.4MG
1 TABLET ORAL DAILY
Status: DISCONTINUED | OUTPATIENT
Start: 2021-01-05 | End: 2021-01-04

## 2020-12-29 RX ORDER — LORAZEPAM 0.5 MG/1
0.5 TABLET ORAL
Status: CANCELLED | OUTPATIENT
Start: 2020-12-29

## 2020-12-29 RX ORDER — ZINC SULFATE 50(220)MG
220 CAPSULE ORAL DAILY
Status: CANCELLED | OUTPATIENT
Start: 2020-12-30 | End: 2021-01-05

## 2020-12-29 RX ORDER — FLUTICASONE FUROATE AND VILANTEROL 200; 25 UG/1; UG/1
1 POWDER RESPIRATORY (INHALATION)
Status: DISCONTINUED | OUTPATIENT
Start: 2020-12-29 | End: 2020-12-29 | Stop reason: HOSPADM

## 2020-12-29 RX ORDER — FUROSEMIDE 40 MG/1
40 TABLET ORAL DAILY
Status: DISCONTINUED | OUTPATIENT
Start: 2020-12-30 | End: 2020-12-30

## 2020-12-29 RX ORDER — ASPIRIN 81 MG/1
81 TABLET, CHEWABLE ORAL DAILY
Status: DISCONTINUED | OUTPATIENT
Start: 2020-12-29 | End: 2020-12-29 | Stop reason: HOSPADM

## 2020-12-29 RX ORDER — DIVALPROEX SODIUM 250 MG/1
250 TABLET, DELAYED RELEASE ORAL EVERY 12 HOURS SCHEDULED
Status: DISCONTINUED | OUTPATIENT
Start: 2020-12-29 | End: 2021-01-01 | Stop reason: SDUPTHER

## 2020-12-29 RX ORDER — HALOPERIDOL 1 MG/1
1 TABLET ORAL DAILY PRN
Status: CANCELLED | OUTPATIENT
Start: 2020-12-29

## 2020-12-29 RX ORDER — LORAZEPAM 0.5 MG/1
0.5 TABLET ORAL
Status: DISCONTINUED | OUTPATIENT
Start: 2020-12-29 | End: 2021-01-04 | Stop reason: HOSPADM

## 2020-12-29 RX ORDER — DEXAMETHASONE SODIUM PHOSPHATE 4 MG/ML
6 INJECTION, SOLUTION INTRA-ARTICULAR; INTRALESIONAL; INTRAMUSCULAR; INTRAVENOUS; SOFT TISSUE EVERY 24 HOURS
Status: DISCONTINUED | OUTPATIENT
Start: 2020-12-29 | End: 2020-12-29 | Stop reason: HOSPADM

## 2020-12-29 RX ORDER — DIGOXIN 125 MCG
125 TABLET ORAL DAILY
Status: CANCELLED | OUTPATIENT
Start: 2020-12-30

## 2020-12-29 RX ORDER — ATORVASTATIN CALCIUM 40 MG/1
20 TABLET, FILM COATED ORAL DAILY
Status: DISCONTINUED | OUTPATIENT
Start: 2020-12-29 | End: 2020-12-29 | Stop reason: HOSPADM

## 2020-12-29 RX ORDER — DIGOXIN 125 MCG
125 TABLET ORAL DAILY
Status: DISCONTINUED | OUTPATIENT
Start: 2020-12-30 | End: 2021-01-04 | Stop reason: HOSPADM

## 2020-12-29 RX ORDER — METOPROLOL TARTRATE 100 MG/1
100 TABLET ORAL EVERY 12 HOURS SCHEDULED
Status: DISCONTINUED | OUTPATIENT
Start: 2020-12-29 | End: 2021-01-04 | Stop reason: HOSPADM

## 2020-12-29 RX ORDER — ASPIRIN 81 MG/1
81 TABLET, CHEWABLE ORAL DAILY
Status: CANCELLED | OUTPATIENT
Start: 2020-12-30

## 2020-12-29 RX ORDER — DIGOXIN 125 MCG
125 TABLET ORAL DAILY
Status: DISCONTINUED | OUTPATIENT
Start: 2020-12-29 | End: 2020-12-29 | Stop reason: HOSPADM

## 2020-12-29 RX ORDER — METOPROLOL TARTRATE 50 MG/1
100 TABLET, FILM COATED ORAL EVERY 12 HOURS SCHEDULED
Status: CANCELLED | OUTPATIENT
Start: 2020-12-29

## 2020-12-29 RX ORDER — ACETAMINOPHEN 325 MG/1
975 TABLET ORAL EVERY 6 HOURS PRN
Status: DISCONTINUED | OUTPATIENT
Start: 2020-12-29 | End: 2020-12-29 | Stop reason: HOSPADM

## 2020-12-29 RX ORDER — LORAZEPAM 0.5 MG/1
0.5 TABLET ORAL EVERY 6 HOURS PRN
Status: DISCONTINUED | OUTPATIENT
Start: 2020-12-29 | End: 2020-12-29 | Stop reason: HOSPADM

## 2020-12-29 RX ORDER — MELATONIN
2000 DAILY
Status: DISCONTINUED | OUTPATIENT
Start: 2020-12-29 | End: 2020-12-29 | Stop reason: SDUPTHER

## 2020-12-29 RX ORDER — ATORVASTATIN CALCIUM 40 MG/1
20 TABLET, FILM COATED ORAL DAILY
Status: CANCELLED | OUTPATIENT
Start: 2020-12-29

## 2020-12-29 RX ORDER — POLYETHYLENE GLYCOL 3350 17 G/17G
17 POWDER, FOR SOLUTION ORAL DAILY
Status: CANCELLED | OUTPATIENT
Start: 2020-12-30

## 2020-12-29 RX ORDER — BUPROPION HYDROCHLORIDE 150 MG/1
150 TABLET ORAL DAILY
Status: CANCELLED | OUTPATIENT
Start: 2020-12-30

## 2020-12-29 RX ORDER — ZINC SULFATE 50(220)MG
220 CAPSULE ORAL DAILY
Status: DISCONTINUED | OUTPATIENT
Start: 2020-12-30 | End: 2021-01-04

## 2020-12-29 RX ORDER — POLYETHYLENE GLYCOL 3350 17 G/17G
17 POWDER, FOR SOLUTION ORAL DAILY
Status: DISCONTINUED | OUTPATIENT
Start: 2020-12-30 | End: 2021-01-04 | Stop reason: HOSPADM

## 2020-12-29 RX ORDER — MULTIVITAMIN/IRON/FOLIC ACID 18MG-0.4MG
1 TABLET ORAL DAILY
Status: CANCELLED | OUTPATIENT
Start: 2021-01-05

## 2020-12-29 RX ORDER — ASPIRIN 81 MG/1
81 TABLET, CHEWABLE ORAL DAILY
Status: DISCONTINUED | OUTPATIENT
Start: 2020-12-30 | End: 2021-01-04 | Stop reason: HOSPADM

## 2020-12-29 RX ORDER — SENNOSIDES 8.6 MG
1 TABLET ORAL DAILY
Status: CANCELLED | OUTPATIENT
Start: 2020-12-30

## 2020-12-29 RX ORDER — BISACODYL 10 MG
10 SUPPOSITORY, RECTAL RECTAL DAILY PRN
Status: DISCONTINUED | OUTPATIENT
Start: 2020-12-29 | End: 2020-12-29 | Stop reason: HOSPADM

## 2020-12-29 RX ORDER — NYSTATIN 100000 [USP'U]/G
POWDER TOPICAL 2 TIMES DAILY
Status: DISCONTINUED | OUTPATIENT
Start: 2020-12-29 | End: 2020-12-29 | Stop reason: HOSPADM

## 2020-12-29 RX ORDER — ONDANSETRON 2 MG/ML
4 INJECTION INTRAMUSCULAR; INTRAVENOUS EVERY 6 HOURS PRN
Status: DISCONTINUED | OUTPATIENT
Start: 2020-12-29 | End: 2021-01-04 | Stop reason: HOSPADM

## 2020-12-29 RX ORDER — MELATONIN
2000 DAILY
Status: DISCONTINUED | OUTPATIENT
Start: 2020-12-30 | End: 2021-01-04 | Stop reason: HOSPADM

## 2020-12-29 RX ORDER — ASCORBIC ACID 500 MG
1000 TABLET ORAL EVERY 12 HOURS SCHEDULED
Status: DISCONTINUED | OUTPATIENT
Start: 2020-12-29 | End: 2021-01-04

## 2020-12-29 RX ORDER — DEXAMETHASONE SODIUM PHOSPHATE 4 MG/ML
6 INJECTION, SOLUTION INTRA-ARTICULAR; INTRALESIONAL; INTRAMUSCULAR; INTRAVENOUS; SOFT TISSUE EVERY 24 HOURS
Status: CANCELLED | OUTPATIENT
Start: 2020-12-30 | End: 2021-01-08

## 2020-12-29 RX ORDER — POLYETHYLENE GLYCOL 3350 17 G/17G
17 POWDER, FOR SOLUTION ORAL DAILY
Status: DISCONTINUED | OUTPATIENT
Start: 2020-12-29 | End: 2020-12-29 | Stop reason: HOSPADM

## 2020-12-29 RX ORDER — LANOLIN ALCOHOL/MO/W.PET/CERES
6 CREAM (GRAM) TOPICAL
Status: DISCONTINUED | OUTPATIENT
Start: 2020-12-29 | End: 2021-01-04 | Stop reason: HOSPADM

## 2020-12-29 RX ORDER — ASCORBIC ACID 500 MG
1000 TABLET ORAL EVERY 12 HOURS SCHEDULED
Status: CANCELLED | OUTPATIENT
Start: 2020-12-29 | End: 2021-01-04

## 2020-12-29 RX ORDER — CARBAMAZEPINE 100 MG/1
100 TABLET, CHEWABLE ORAL 3 TIMES DAILY
Status: DISCONTINUED | OUTPATIENT
Start: 2020-12-29 | End: 2020-12-29 | Stop reason: HOSPADM

## 2020-12-29 RX ORDER — MULTIVITAMIN/IRON/FOLIC ACID 18MG-0.4MG
1 TABLET ORAL DAILY
Status: DISCONTINUED | OUTPATIENT
Start: 2021-01-05 | End: 2020-12-29 | Stop reason: HOSPADM

## 2020-12-29 RX ORDER — FAMOTIDINE 20 MG/1
10 TABLET, FILM COATED ORAL DAILY
Status: DISCONTINUED | OUTPATIENT
Start: 2020-12-29 | End: 2020-12-29 | Stop reason: HOSPADM

## 2020-12-29 RX ORDER — FAMOTIDINE 20 MG/1
10 TABLET, FILM COATED ORAL DAILY
Status: DISCONTINUED | OUTPATIENT
Start: 2020-12-30 | End: 2021-01-04 | Stop reason: HOSPADM

## 2020-12-29 RX ORDER — SENNOSIDES 8.6 MG
1 TABLET ORAL DAILY
Status: DISCONTINUED | OUTPATIENT
Start: 2020-12-30 | End: 2021-01-04 | Stop reason: HOSPADM

## 2020-12-29 RX ORDER — LORAZEPAM 0.5 MG/1
0.5 TABLET ORAL EVERY 6 HOURS PRN
Status: DISCONTINUED | OUTPATIENT
Start: 2020-12-29 | End: 2021-01-04 | Stop reason: HOSPADM

## 2020-12-29 RX ORDER — HYDROMORPHONE HYDROCHLORIDE 2 MG/1
2 TABLET ORAL EVERY 6 HOURS PRN
Status: DISCONTINUED | OUTPATIENT
Start: 2020-12-29 | End: 2021-01-04 | Stop reason: HOSPADM

## 2020-12-29 RX ORDER — LANOLIN ALCOHOL/MO/W.PET/CERES
6 CREAM (GRAM) TOPICAL
Status: DISCONTINUED | OUTPATIENT
Start: 2020-12-29 | End: 2020-12-29 | Stop reason: HOSPADM

## 2020-12-29 RX ORDER — MELATONIN
2000 DAILY
Status: CANCELLED | OUTPATIENT
Start: 2020-12-30

## 2020-12-29 RX ORDER — ATORVASTATIN CALCIUM 20 MG/1
20 TABLET, FILM COATED ORAL
Status: DISCONTINUED | OUTPATIENT
Start: 2020-12-29 | End: 2021-01-04 | Stop reason: HOSPADM

## 2020-12-29 RX ORDER — LORAZEPAM 0.5 MG/1
0.5 TABLET ORAL
Status: DISCONTINUED | OUTPATIENT
Start: 2020-12-29 | End: 2020-12-29 | Stop reason: HOSPADM

## 2020-12-29 RX ORDER — ACETAMINOPHEN 325 MG/1
975 TABLET ORAL EVERY 6 HOURS PRN
Status: DISCONTINUED | OUTPATIENT
Start: 2020-12-29 | End: 2021-01-04 | Stop reason: HOSPADM

## 2020-12-29 RX ADMIN — ATORVASTATIN CALCIUM 20 MG: 20 TABLET, FILM COATED ORAL at 18:25

## 2020-12-29 RX ADMIN — MELATONIN TAB 3 MG 6 MG: 3 TAB at 22:19

## 2020-12-29 RX ADMIN — METOPROLOL TARTRATE 100 MG: 50 TABLET, FILM COATED ORAL at 03:45

## 2020-12-29 RX ADMIN — DEXAMETHASONE SODIUM PHOSPHATE 6 MG: 4 INJECTION, SOLUTION INTRAMUSCULAR; INTRAVENOUS at 04:01

## 2020-12-29 RX ADMIN — ASPIRIN 81 MG CHEWABLE TABLET 81 MG: 81 TABLET CHEWABLE at 09:33

## 2020-12-29 RX ADMIN — DIVALPROEX SODIUM 250 MG: 250 TABLET, DELAYED RELEASE ORAL at 03:46

## 2020-12-29 RX ADMIN — APIXABAN 2.5 MG: 2.5 TABLET, FILM COATED ORAL at 18:25

## 2020-12-29 RX ADMIN — FAMOTIDINE 10 MG: 20 TABLET ORAL at 09:34

## 2020-12-29 RX ADMIN — Medication 2000 UNITS: at 09:39

## 2020-12-29 RX ADMIN — BUPROPION 150 MG: 150 TABLET, EXTENDED RELEASE ORAL at 09:33

## 2020-12-29 RX ADMIN — ZINC SULFATE 220 MG (50 MG) CAPSULE 220 MG: CAPSULE at 09:46

## 2020-12-29 RX ADMIN — DIVALPROEX SODIUM 250 MG: 250 TABLET, DELAYED RELEASE ORAL at 22:19

## 2020-12-29 RX ADMIN — OXYCODONE HYDROCHLORIDE AND ACETAMINOPHEN 1000 MG: 500 TABLET ORAL at 03:45

## 2020-12-29 RX ADMIN — SENNOSIDES 8.6 MG: 8.6 TABLET, FILM COATED ORAL at 09:43

## 2020-12-29 RX ADMIN — ACETAMINOPHEN 975 MG: 325 TABLET, FILM COATED ORAL at 04:09

## 2020-12-29 RX ADMIN — CEFEPIME HYDROCHLORIDE 2000 MG: 2 INJECTION, POWDER, FOR SOLUTION INTRAVENOUS at 23:11

## 2020-12-29 RX ADMIN — METOPROLOL TARTRATE 100 MG: 100 TABLET, FILM COATED ORAL at 22:19

## 2020-12-29 RX ADMIN — LORAZEPAM 0.5 MG: 0.5 TABLET ORAL at 03:46

## 2020-12-29 RX ADMIN — ACETAMINOPHEN 975 MG: 325 TABLET, FILM COATED ORAL at 19:45

## 2020-12-29 RX ADMIN — CARBAMAZEPINE 100 MG: 100 TABLET, CHEWABLE ORAL at 22:19

## 2020-12-29 RX ADMIN — LORAZEPAM 0.5 MG: 0.5 TABLET ORAL at 22:19

## 2020-12-29 RX ADMIN — OXYCODONE HYDROCHLORIDE AND ACETAMINOPHEN 1000 MG: 500 TABLET ORAL at 22:19

## 2020-12-29 RX ADMIN — MELATONIN 6 MG: at 03:46

## 2020-12-29 RX ADMIN — OXYCODONE HYDROCHLORIDE AND ACETAMINOPHEN 1000 MG: 500 TABLET ORAL at 09:46

## 2020-12-29 RX ADMIN — CARBAMAZEPINE 100 MG: 100 TABLET, CHEWABLE ORAL at 09:44

## 2020-12-29 RX ADMIN — APIXABAN 2.5 MG: 2.5 TABLET, FILM COATED ORAL at 09:41

## 2020-12-29 NOTE — ED PROVIDER NOTES
History  Chief Complaint   Patient presents with    Fever - 9 weeks to 74 years     per gets facility, pt has had fever in 102 2 and was sleepy, pt refused to take meds at facility  pt covid positive     78-year-old female presents for evaluation of shortness of breath and fever  She is arriving from [address] [place] [address] and apparently has recently tested positive for COVID-19  Patient herself has a baseline dementia and is unable to provide any useful history of present illness  I was able to speak to the patient's daughter who states that she has been feeling ill for the past few days  EMS reports that nursing home staff stated that the patient wears 2 L of oxygen by nasal cannula at baseline but recently has had increased oxygen requirements, cough, and fever  As best I can gather, patient's symptoms are described as moderate, constant, gradually worsening  History provided by:  Patient   used: No        Prior to Admission Medications   Prescriptions Last Dose Informant Patient Reported? Taking?    Acetaminophen 325 MG CAPS  Outside Facility (Specify) Yes No   Sig: Take by mouth   Cholecalciferol (VITAMIN D) 2000 units CAPS  Outside Facility (Specify) No No   Sig: Take 1 capsule (2,000 Units total) by mouth daily   Citric Bu-Edncsbhxofp-Ij Carb (RENACIDIN) SOLN  Outside Facility (Specify) Yes Yes   Sig: Indications: 60ml to irrigate baca once a week on Friday   Hydromorphone HCl 1 MG/ML LIQD   Yes Yes   Sig: Take 2 5 mg by mouth every 3 (three) hours as needed   Hyoscyamine Sulfate (HYOSCYAMINE PO)   Yes Yes   Sig: Take 0 125 mg by mouth every 4 (four) hours as needed (secretions)   LORazepam (ATIVAN) 0 5 mg tablet   No No   Sig: Take 1 tablet (0 5 mg total) by mouth 2 (two) times a day for 5 doses   LORazepam (ATIVAN) 0 5 mg tablet   Yes No   Si 5 mg daily at bedtime    albuterol (PROVENTIL HFA,VENTOLIN HFA) 90 mcg/act inhaler  Outside Facility (Specify) No Yes   Sig: Inhale 2 puffs every 6 (six) hours as needed for wheezing   allopurinol (ZYLOPRIM) 300 mg tablet   Yes No   apixaban (Eliquis) 2 5 mg   Yes Yes   Sig: Take 2 5 mg by mouth 2 (two) times a day For 14 days   aspirin 81 mg chewable tablet   Yes Yes   Sig: Chew 81 mg daily   atorvastatin (LIPITOR) 10 mg tablet  Outside Facility (Specify) No No   Sig: Take 1 tablet (10 mg total) by mouth daily   betamethasone dipropionate (DIPROSONE) 0 05 % cream  Outside Facility (Specify) Yes No   Sig: Apply topically 3 (three) times a day   bisacodyl (DULCOLAX) 10 mg suppository   Yes Yes   Sig: Insert 10 mg into the rectum daily as needed for constipation   buPROPion (WELLBUTRIN SR) 150 mg 12 hr tablet  Outside Facility (Specify) No Yes   Sig: Take 1 tablet (150 mg total) by mouth daily   carBAMazepine (TEGretol) 100 mg chewable tablet  Outside Facility (Specify) No Yes   Sig: Chew 1 tablet (100 mg total) 3 (three) times a day   clindamycin (CLEOCIN) 300 MG capsule  Outside Facility (Specify) Yes No   Sig: Take 600 mg by mouth every 8 (eight) hours   clopidogrel (PLAVIX) 75 mg tablet  Outside Facility (Specify) No No   Sig: Take 1 tablet (75 mg total) by mouth daily   digoxin (LANOXIN) 0 125 mg tablet  Outside Facility (Specify) Yes Yes   Sig: Take 125 mcg by mouth daily   divalproex sodium (DEPAKOTE) 250 mg EC tablet   Yes Yes   Sig: Take 250 mg by mouth every 12 (twelve) hours    famotidine (PEPCID) 20 mg tablet   Yes No   fenofibrate micronized (LOFIBRA) 67 MG capsule  Outside Facility (Specify) No No   Sig: Take 1 capsule (67 mg total) by mouth daily with breakfast   Patient taking differently: Take 134 mg by mouth daily with breakfast    ferrous sulfate 325 (65 Fe) mg tablet  Outside Facility (Specify) No No   Sig: Take 1 tablet (325 mg total) by mouth 2 (two) times a day   fluticasone-salmeterol (ADVAIR, WIXELA) 250-50 mcg/dose inhaler   No Yes   Sig: Inhale 1 puff 2 (two) times a day Rinse mouth after use     furosemide (LASIX) 40 mg tablet  Outside Facility (Specify) No Yes   Sig: Take 1 tablet (40 mg total) by mouth 2 (two) times a day   Patient taking differently: Take 40 mg by mouth daily    haloperidol (HALDOL) 1 mg tablet   Yes Yes   Sig: Take 1 mg by mouth as needed for agitation   ipratropium-albuterol (DUO-NEB) 0 5-2 5 mg/3 mL nebulizer solution  Outside Facility (Specify) Yes Yes   Sig: Take 3 mL by nebulization 4 (four) times a day   lactobacillus acidophilus  Outside Facility (Specify) Yes No   Sig: Take 1 capsule by mouth daily   levothyroxine 100 mcg tablet   Yes No   melatonin 3 mg   Yes Yes   Sig: Take 6 mg by mouth daily at bedtime   metoprolol tartrate (LOPRESSOR) 100 mg tablet  Outside Facility (Specify) No Yes   Sig: Take 1 tablet (100 mg total) by mouth every 12 (twelve) hours   nitroglycerin (NITROSTAT) 0 4 mg SL tablet  Outside Facility (Specify) No No   Sig: Place 1 tablet (0 4 mg total) under the tongue every 5 (five) minutes as needed for chest pain   nystatin (MYCOSTATIN) powder   Yes Yes   Sig: Apply topically as needed (abdominal irritation)   polyethylene glycol (MIRALAX) 17 g packet   Yes Yes   Sig: Take 17 g by mouth daily   potassium chloride (K-DUR,KLOR-CON) 20 mEq tablet  Outside Facility (Specify) No No   Sig: Take 1 tablet (20 mEq total) by mouth daily   prochlorperazine (COMPAZINE) 10 mg tablet   Yes Yes   Sig: Take 10 mg by mouth every 6 (six) hours as needed for nausea or vomiting   senna (SENOKOT) 8 6 MG tablet   Yes Yes   Sig: Take 1 tablet by mouth daily   warfarin (COUMADIN) 1 mg tablet   Yes No   warfarin (COUMADIN) 2 5 mg tablet   Yes No   warfarin (COUMADIN) 3 mg tablet  Outside Facility (Specify) No No   Sig: One tablet daily or as otherwise directed        Facility-Administered Medications: None       Past Medical History:   Diagnosis Date    Atrial fibrillation ([address] )     Brain aneurysm     CAD (coronary artery disease)     Cardiac disease     had stents 12 years ago in [address] [place] [address] CKD (chronic kidney disease) stage 3, GFR 30-59 ml/min (HCC) ckd    COPD (chronic obstructive pulmonary disease) (HCC)     Dementia (HCC)     Diastolic CHF, chronic (HCC)     GERD (gastroesophageal reflux disease)     Hyperlipidemia     Hyperlipidemia     Hypertension     Hypothyroidism     Migraine     PAD (peripheral artery disease) (HCC)     Renal disorder     [facility]ures [facility])     Stroke [facility])        Past Surgical History:   Procedure Laterality Date    APPENDECTOMY      ARTERIOGRAM N/A 2018    Procedure: ARTERIOGRAM WITH STENT PLACEMENT;  Surgeon: Lucita Reddy MD;  [facility]: [facility];  Service: Vascular    BLADDER TUMOR EXCISION      BRAIN SURGERY        clip right frontal lobe    CARDIAC SURGERY      COLONOSCOPY      CORONARY STENT PLACEMENT      5 stents    EYE SURGERY      IR AORTAGRAM WITH RUN-OFF  2018    MANDIBLE FRACTURE SURGERY      TONSILLECTOMY      TUBAL LIGATION      UTERINE FIBROID SURGERY         Family History   Problem Relation Age of Onset    Heart disease Father     Parkinsonism Father     Macular degeneration Mother     Glaucoma Mother     Diabetes Brother     Heart disease Brother      I have reviewed and agree with the history as documented  E-Cigarette/Vaping     E-Cigarette/Vaping Substances     Social History     Tobacco Use    Smoking status: Former Smoker     Packs/day: 5 00     Years: 40 00     Pack years: 200 00     Quit date: 2007     Years since quittin 3    Smokeless tobacco: Never Used   Substance Use Topics    Alcohol use: Never     Alcohol/week: 0 0 standard drinks     Frequency: Never    Drug use: No       Review of Systems   Unable to perform ROS: Dementia       Physical Exam  Physical Exam  Constitutional:       Appearance: Normal appearance  She is ill-appearing  She is not toxic-appearing  HENT:      Head: Normocephalic and atraumatic        Nose: Nose normal       Mouth/Throat:      Mouth: Mucous membranes are moist       Pharynx: Oropharynx is clear  Eyes:      Extraocular Movements: Extraocular movements intact  Conjunctiva/sclera: Conjunctivae normal       Pupils: Pupils are equal, round, and reactive to light  Neck:      Musculoskeletal: Normal range of motion and neck supple  No neck rigidity  Cardiovascular:      Rate and Rhythm: Normal rate and regular rhythm  Pulses: Normal pulses  Pulmonary:      Effort: Pulmonary effort is normal       Breath sounds: Normal breath sounds  Comments: Mild tachypnea without significant conversational dyspnea on oxygen by nasal cannula  Oxygen saturations on patient's home oxygen were in the mid to upper 80s  This improved with increase to 4 L by nasal cannula  Abdominal:      General: There is no distension  Palpations: Abdomen is soft  Tenderness: There is no abdominal tenderness  Genitourinary:     Comments: Davis catheter in place  Musculoskeletal: Normal range of motion  General: No swelling, tenderness or signs of injury  Skin:     General: Skin is warm and dry  Capillary Refill: Capillary refill takes less than 2 seconds  Findings: No rash  Neurological:      General: No focal deficit present  Mental Status: She is alert  Comments: Confused/demented  This is apparently baseline per patient's daughter  Unable to participate fully in a detailed neurologic exam but no focal deficits are noted  Psychiatric:         Mood and Affect: Mood normal          Thought Content:  Thought content normal          Vital Signs  ED Triage Vitals [12/28/20 2103]   Temperature Pulse Respirations Blood Pressure SpO2   100 5 °F (38 1 °C) 63 18 123/60 (!) 88 %      Temp Source Heart Rate Source Patient Position - Orthostatic VS BP Location FiO2 (%)   Oral Monitor Lying Right arm --      Pain Score       --           Vitals:    12/28/20 2103 12/28/20 2248   BP: 123/60 128/62   Pulse: 63 59   Patient Position - Orthostatic VS: Lying Lying         Visual Acuity      ED Medications  Medications   sodium chloride 0 9 % bolus 1,000 mL (0 mL Intravenous Stopped 12/28/20 2246)       Diagnostic Studies  Results Reviewed     Procedure Component Value Units Date/Time    Urine Microscopic [[id number]]  (Abnormal) Collected: 12/28/20 2246    Lab Status: Final result Specimen: Urine, Indwelling Davis Catheter Updated: 12/28/20 2311     RBC, UA 30-50 /hpf      WBC, UA 20-30 /hpf      Epithelial Cells Occasional /hpf      Bacteria, UA Moderate /hpf     UA (URINE) with reflex to Scope [[id number]]  (Abnormal) Collected: 12/28/20 2246    Lab Status: Final result Specimen: Urine, Indwelling Davis Catheter Updated: 12/28/20 2257     Color, UA Yellow     Clarity, UA Cloudy     Specific Gravity, UA 1 025     pH, UA 5 5     Leukocytes, UA Small     Nitrite, UA Positive     Protein,  (2+) mg/dl      Glucose, UA Negative mg/dl      Ketones, UA Negative mg/dl      Urobilinogen, UA 1 0 E U /dl      Bilirubin, UA Negative     Blood, UA Large    Basic metabolic panel [[id number]]  (Abnormal) Collected: 12/28/20 2150    Lab Status: Final result Specimen: Blood from Arm, Left Updated: 12/28/20 2219     Sodium 147 mmol/L      Potassium 3 9 mmol/L      Chloride 106 mmol/L      CO2 31 mmol/L      ANION GAP 10 mmol/L      BUN 29 mg/dL      Creatinine 1 73 mg/dL      Glucose 118 mg/dL      Calcium 9 2 mg/dL      eGFR 29 ml/min/1 73sq m     Narrative:      Justine guidelines for Chronic Kidney Disease (CKD):     Stage 1 with normal or high GFR (GFR > 90 mL/min/1 73 square meters)    Stage 2 Mild CKD (GFR = 60-89 mL/min/1 73 square meters)    Stage 3A Moderate CKD (GFR = 45-59 mL/min/1 73 square meters)    Stage 3B Moderate CKD (GFR = 30-44 mL/min/1 73 square meters)    Stage 4 Severe CKD (GFR = 15-29 mL/min/1 73 square meters)    Stage 5 End Stage CKD (GFR <15 mL/min/1 73 square meters)  Note: GFR calculation is accurate only with a steady state creatinine    Hepatic function panel [[id number]]  (Abnormal) Collected: 12/28/20 2150    Lab Status: Final result Specimen: Blood from Arm, Left Updated: 12/28/20 2219     Total Bilirubin 0 50 mg/dL      Bilirubin, Direct 0 31 mg/dL      Alkaline Phosphatase 84 U/L      AST 37 U/L      ALT 25 U/L      Total Protein 7 5 g/dL      Albumin 3 0 g/dL     NT-BNP PRO [[id number]]  (Abnormal) Collected: 12/28/20 2150    Lab Status: Final result Specimen: Blood from Arm, Left Updated: 12/28/20 2219     NT-proBNP 1,989 pg/mL     Troponin I [[id number]]  (Abnormal) Collected: 12/28/20 2150    Lab Status: Final result Specimen: Blood from Arm, Left Updated: 12/28/20 2213     Troponin I 0 05 ng/mL     D-Dimer [[id number]]  (Abnormal) Collected: 12/28/20 2150    Lab Status: Final result Specimen: Blood from Arm, Left Updated: 12/28/20 2210     D-Dimer, Quant 1 02 ug/ml FEU     CBC and differential [[id number]]  (Abnormal) Collected: 12/28/20 2150    Lab Status: Final result Specimen: Blood from Arm, Left Updated: 12/28/20 2159     WBC 2 64 Thousand/uL      RBC 3 63 Million/uL      Hemoglobin 11 8 g/dL      Hematocrit 36 9 %       fL      MCH 32 5 pg      MCHC 32 0 g/dL      RDW 13 7 %      MPV 11 7 fL      Platelets 98 Thousands/uL      nRBC 0 /100 WBCs      Neutrophils Relative 41 %      Immat GRANS % 1 %      Lymphocytes Relative 42 %      Monocytes Relative 16 %      Eosinophils Relative 0 %      Basophils Relative 0 %      Neutrophils Absolute 1 07 Thousands/µL      Immature Grans Absolute 0 02 Thousand/uL      Lymphocytes Absolute 1 11 Thousands/µL      Monocytes Absolute 0 42 Thousand/µL      Eosinophils Absolute 0 01 Thousand/µL      Basophils Absolute 0 01 Thousands/µL                  XR chest 1 view portable    (Results Pending)              Procedures  CriticalCare Time  Performed by: Ronald Valdez MD  Authorized by: Ronald Valdez MD     Critical care provider statement:     Critical care time (minutes):  41    Critical care time was exclusive of:  Separately billable procedures and treating other patients and teaching time    Critical care was necessary to treat or prevent imminent or life-threatening deterioration of the following conditions:  Respiratory failure    Critical care was time spent personally by me on the following activities:  Blood draw for specimens, obtaining history from patient or surrogate, development of treatment plan with patient or surrogate, discussions with consultants, evaluation of patient's response to treatment, examination of patient, ordering and performing treatments and interventions, ordering and review of laboratory studies, ordering and review of radiographic studies, re-evaluation of patient's condition and review of old charts    I assumed direction of critical care for this patient from another provider in my specialty: no    Comments:      Required additional oxygen by nasal cannula  ED Course  ED Course as of Dec 28 2356   Mon Dec 28, 2020   2353 EKG reviewed by me, normal sinus rhythm at a rate of 62  There is significant artifact but this does not appear to be any acute ST abnormalities indicative cardiac ischemia  Normal intervals and axis  SBIRT 20yo+      Most Recent Value   SBIRT (22 yo +)   In order to provide better care to our patients, we are screening all of our patients for alcohol and drug use  Would it be okay to ask you these screening questions? Yes Filed at: 12/28/2020 2118   Initial Alcohol Screen: US AUDIT-C    1  How often do you have a drink containing alcohol?  0 Filed at: 12/28/2020 2118   2  How many drinks containing alcohol do you have on a typical day you are drinking? 0 Filed at: 12/28/2020 2118   3b  FEMALE Any Age, or MALE 65+: How often do you have 4 or more drinks on one occassion?   0 Filed at: 12/28/2020 2118   Audit-C Score  0 Filed at: 12/28/2020 2118   REINALDO: How many times in the past year have you    Used an illegal drug or used a prescription medication for non-medical reasons? Never Filed at: 12/28/2020 2118                    [facility]  Number of Diagnoses or Management Options  COVID-19: new and requires workup  Fever: new and requires workup  Hypoxia: new and requires workup  Diagnosis management comments: 71-year-old female with fever and worsening shortness of breath likely secondary to COVID-19, for which she tested positive several days ago  Requiring additional oxygen by nasal cannula  Discussed with daughter  She will be DNR/DNI but is open to hospitalization possible stabilization  Amount and/or Complexity of Data Reviewed  Clinical lab tests: reviewed and ordered  Tests in the radiology section of CPT®: reviewed and ordered  Review and summarize past medical records: yes  Discuss the patient with other providers: yes  Independent visualization of images, tracings, or specimens: yes        Disposition  Final diagnoses:   COVID-19   Hypoxia   Fever     Time reflects when diagnosis was documented in both MDM as applicable and the Disposition within this note     Time User Action Codes Description Comment    12/28/2020 10:39 PM Holli Martinez [U07 1] COVID-19     12/28/2020 11:55 PM Holli Torres Add [R09 02] Hypoxia     12/28/2020 11:55 PM Holli Torres Add [R50 9] Fever       ED Disposition     ED Disposition Condition Date/Time Comment    Admit Stable Mon Dec 28, 2020 10:39 PM Case was discussed with HANANE and the patient's admission status was agreed to be Admission Status: inpatient status to the service of Dr David Dickson    None         Patient's Medications   Discharge Prescriptions    No medications on file     No discharge procedures on file      PDMP Review     None          ED Provider  Electronically Signed by           Storm Cerda MD  12/28/20 3111

## 2020-12-29 NOTE — H&P
H&P- Haylee Fitzpatrick [birth year], 77 y o  female MRN: [id number]    Unit/Bed#: ED 09 Encounter: [id number]    Primary Care Provider: Zac Sherman DO   Date and time admitted to hospital: 12/28/2020  8:52 PM        * Hypoxia  Assessment & Plan  · Sent from [facility] when O2 sat noted to be mid 80s on her baseline of 2L NC  Currently O2 sat 93-94% on 3L via FM at rest  Per ED provider NH states she tested (+) for COVID on 12/24  · Initial COVID labs pending  · Incentive spirometry  · Respiratory protocol  · Initiated on mild disease pathway  · Maintain COVID precautions    Pneumonia due to COVID-19 virus  Assessment & Plan  · See AP above  · Currently afebrile and denies SOB  · Will hold off on initiating antibiotics pending procalcitonin results    Elevated troponin  Assessment & Plan  · Initial troponin mildly elevated at 0 05  Will trend x 3  · Denies chest pain  · Likely 2/2 active viral infection as above  · 12 lead EKG revealed SR with no Bertha  · Continue home dose ASA  · Previous prescription for Lipitor not listed on MAR provided by NH  Will restart    COPD (chronic obstructive pulmonary disease) ([facility] [address] )  Assessment & Plan  · Exacerbated by COVID PNA  No wheezing noted at this time  Per records wears O22L NC at baseline   Currently requiring 3-4L NC to maintain O2 sat > 90%  · Continue home dose Adviar (equivalent) and Albuterol MDI PRN    Chronic diastolic heart failure (HCC)  Assessment & Plan  Wt Readings from Last 3 Encounters:   12/28/20 79 4 kg (175 lb 0 7 oz)   06/01/20 81 9 kg (180 lb 8 9 oz)   01/16/20 82 1 kg (181 lb)     · Does not appear acutely volume overloaded on exam  · Continue home dose Lasix  · Daily weight and I/O        Stage 3 chronic kidney disease  Assessment & Plan  Lab Results   Component Value Date    EGFR 29 12/28/2020    EGFR 37 06/01/2020    EGFR 34 01/05/2020    CREATININE 1 73 (H) 12/28/2020    CREATININE 1 42 (H) 06/01/2020    CREATININE 1 52 (H) 01/05/2020 · Creatine 1 73 on admission which appears to be near baseline  · Avoid hypotension and nephrotoxic agents  · BMP in am    Paroxysmal atrial fibrillation (HCC)  Assessment & Plan  · Continue home dose Eliquis and Digoxin      VTE Prophylaxis: Apixaban (Eliquis)  / sequential compression device   Code Status: Level 1 Full Code  POLST: POLST form is not discussed and not completed at this time  Discussion with family: NA    Anticipated Length of Stay:  Patient will be admitted on an Inpatient basis with an anticipated length of stay of  Greater than 2 midnights  Justification for Hospital Stay: See AP above    Total Time for Visit, including Counseling / Coordination of Care: 45 minutes  Greater than 50% of this total time spent on direct patient counseling and coordination of care  Chief Complaint:   Low O2 sat    History of Present Illness: Faraz Macdonald is a 77 y o  female who presents with hypoxia  Sent from [facility] when O2 sat noted to be mid 80s on her baseline of 2L NC  Currently O2 sat 93-94% on 3L via FM at rest  Per ED provider NH states she tested (+) for COVID on 12/24      Review of Systems:    Review of Systems   Unable to perform ROS: Dementia       Past Medical and Surgical History:     Past Medical History:   Diagnosis Date    Atrial fibrillation ([facility] [address]     Brain aneurysm     CAD (coronary artery disease)     Cardiac disease     had stents 12 years ago in [facility]    CKD (chronic kidney disease) stage 3, GFR 30-59 ml/min ckd    COPD (chronic obstructive pulmonary disease) (HCC)     Dementia (HCC)     Diastolic CHF, chronic (HCC)     GERD (gastroesophageal reflux disease)     Hyperlipidemia     Hyperlipidemia     Hypertension     Hypothyroidism     Migraine     [facility]     Renal disorder     Seizures ([facility] [address]     Stroke [facility])        Past Surgical History:   Procedure Laterality Date    APPENDECTOMY      ARTERIOGRAM N/A 12/7/2018    Procedure: ARTERIOGRAM WITH STENT PLACEMENT;  Surgeon: Lucita Reddy MD;  [facility]: [facility];  Service: Vascular    BLADDER TUMOR EXCISION      BRAIN SURGERY      1998  clip right frontal lobe    CARDIAC SURGERY      COLONOSCOPY      CORONARY STENT PLACEMENT      5 stents    EYE SURGERY      IR AORTAGRAM WITH RUN-OFF  12/7/2018    MANDIBLE FRACTURE SURGERY      TONSILLECTOMY      TUBAL LIGATION      UTERINE FIBROID SURGERY         Meds/Allergies:    Prior to Admission medications    Medication Sig Start Date End Date Taking? Authorizing Provider   albuterol (PROVENTIL HFA,VENTOLIN HFA) 90 mcg/act inhaler Inhale 2 puffs every 6 (six) hours as needed for wheezing 12/14/18  Yes Alize Ceballos MD   apixaban (Eliquis) 2 5 mg Take 2 5 mg by mouth 2 (two) times a day For 14 days   Yes Historical Provider, MD   aspirin 81 mg chewable tablet Chew 81 mg daily   Yes Historical Provider, MD   bisacodyl (DULCOLAX) 10 mg suppository Insert 10 mg into the rectum daily as needed for constipation   Yes Historical Provider, MD   buPROPion [facility] SR) 150 mg 12 hr tablet Take 1 tablet (150 mg total) by mouth daily 12/14/18  Yes Alize Ceballos MD   carBAMazepine (TEGretol) 100 mg chewable tablet Chew 1 tablet (100 mg total) 3 (three) times a day 12/14/18  Yes Alize Ceballos MD   Citric Oy-Mahculhztwb-Cn Carb (RENACIDIN) SOLN Indications: 60ml to irrigate baca once a week on Friday   Yes Historical Provider, MD   digoxin (LANOXIN) 0 125 mg tablet Take 125 mcg by mouth daily   Yes Historical Provider, MD   divalproex sodium (DEPAKOTE) 250 mg EC tablet Take 250 mg by mouth every 12 (twelve) hours  5/8/20  Yes Historical Provider, MD   fluticasone-salmeterol (ADVAIR, [address]) 250-50 mcg/dose inhaler Inhale 1 puff 2 (two) times a day Rinse mouth after use   8/18/20  Yes Colie Hatchet, PA-C   furosemide (LASIX) 40 mg tablet Take 1 tablet (40 mg total) by mouth 2 (two) times a day  Patient taking differently: Take 40 mg by mouth daily  4/16/20  Yes Katherine Park MD   haloperidol (HALDOL) 1 mg tablet Take 1 mg by mouth as needed for agitation   Yes Historical Provider, MD   Hydromorphone HCl 1 MG/ML LIQD Take 2 5 mg by mouth every 3 (three) hours as needed   Yes Historical Provider, MD   Hyoscyamine Sulfate (HYOSCYAMINE PO) Take 0 125 mg by mouth every 4 (four) hours as needed (secretions)   Yes Historical Provider, MD   ipratropium-albuterol (DUO-NEB) 0 5-2 5 mg/3 mL nebulizer solution Take 3 mL by nebulization 4 (four) times a day   Yes Historical Provider, MD   melatonin 3 mg Take 6 mg by mouth daily at bedtime   Yes Historical Provider, MD   metoprolol tartrate (LOPRESSOR) 100 mg tablet Take 1 tablet (100 mg total) by mouth every 12 (twelve) hours 10/25/19  Yes Yamile Lara,    nystatin (MYCOSTATIN) powder Apply topically as needed (abdominal irritation)   Yes Historical Provider, MD   polyethylene glycol (MIRALAX) 17 g packet Take 17 g by mouth daily   Yes Historical Provider, MD   prochlorperazine (COMPAZINE) 10 mg tablet Take 10 mg by mouth every 6 (six) hours as needed for nausea or vomiting   Yes Historical Provider, MD   senna (SENOKOT) 8 6 MG tablet Take 1 tablet by mouth daily   Yes Historical Provider, MD   Acetaminophen 325 MG CAPS Take by mouth    Historical Provider, MD   allopurinol (ZYLOPRIM) 300 mg tablet  5/8/20   Historical Provider, MD   atorvastatin (LIPITOR) 10 mg tablet Take 1 tablet (10 mg total) by mouth daily 12/14/18   Heath Wilder MD   betamethasone dipropionate (DIPROSONE) 0 05 % cream Apply topically 3 (three) times a day    Historical Provider, MD   Cholecalciferol (VITAMIN D) 2000 units CAPS Take 1 capsule (2,000 Units total) by mouth daily 12/14/18   Heath Wilder MD   clopidogrel (PLAVIX) 75 mg tablet Take 1 tablet (75 mg total) by mouth daily 12/14/18   Heath Wilder MD   famotidine (PEPCID) 20 mg tablet  5/8/20   Historical Provider, MD   fenofibrate micronized (LOFIBRA) 67 MG capsule Take 1 capsule (67 mg total) by mouth daily with breakfast  Patient taking differently: Take 134 mg by mouth daily with breakfast  12/14/18   Octavio Gillespie MD   ferrous sulfate 325 (65 Fe) mg tablet Take 1 tablet (325 mg total) by mouth 2 (two) times a day 12/14/18   Octavio Gillespie MD   lactobacillus acidophilus Take 1 capsule by mouth daily    Historical Provider, MD   levothyroxine 100 mcg tablet  5/8/20   Historical Provider, MD   LORazepam (ATIVAN) 0 5 mg tablet Take 1 tablet (0 5 mg total) by mouth 2 (two) times a day for 5 doses 10/15/19 1/16/20  TANVI Rodríguez   LORazepam (ATIVAN) 0 5 mg tablet 0 5 mg daily at bedtime  5/16/20   Historical Provider, MD   nitroglycerin (NITROSTAT) 0 4 mg SL tablet Place 1 tablet (0 4 mg total) under the tongue every 5 (five) minutes as needed for chest pain 5/12/18   Jazmyn Yu MD   potassium chloride (K-DUR,KLOR-CON) 20 mEq tablet Take 1 tablet (20 mEq total) by mouth daily 12/14/18   Octavio Gillespie MD   warfarin (COUMADIN) 1 mg tablet  5/8/20   Historical Provider, MD   warfarin (COUMADIN) 2 5 mg tablet  5/8/20   Historical Provider, MD   warfarin (COUMADIN) 3 mg tablet One tablet daily or as otherwise directed  12/14/18   Octavio Gillespie MD   clindamycin (CLEOCIN) 300 MG capsule Take 600 mg by mouth every 8 (eight) hours 12/30/19 12/29/20  Historical Provider, MD     I have reveiwed home medications using records provided by [facility]  Allergies:    Allergies   Allergen Reactions    Dye [Iodinated Diagnostic Agents] Other (See Comments)     Affects kidneys    Spiriva Handihaler [Tiotropium Bromide Monohydrate] Swelling    Ramipril Facial Swelling    Penicillins      unsure       Social History:     Marital Status:    Patient Pre-hospital Living Situation: Resides at [facility]  Patient Pre-hospital Level of Mobility:   Patient Pre-hospital Diet Restrictions:   Substance Use History:   Social History     Substance and Sexual Activity   Alcohol Use Never    Alcohol/week: 0 0 standard drinks    Frequency: Never     Social History     Tobacco Use   Smoking Status Former Smoker    Packs/day: 5 00    Years: 40 00    Pack years: 200 00    Quit date: 2007    Years since quittin 3   Smokeless Tobacco Never Used     Social History     Substance and Sexual Activity   Drug Use No       Family History:    Family History   Problem Relation Age of Onset    Heart disease Father     Parkinsonism Father     Macular degeneration Mother     Glaucoma Mother     Diabetes Brother     Heart disease Brother        Physical Exam:     Vitals:   Blood Pressure: 128/62 (20)  Pulse: 59 (20)  Temperature: 100 5 °F (38 1 °C) (20)  Temp Source: Oral (20)  Respirations: 18 (20)  Weight - Scale: 79 4 kg (175 lb 0 7 oz) (20)  SpO2: 96 % (20)    Physical Exam  Vitals signs reviewed  Constitutional:       General: She is not in acute distress  Appearance: She is ill-appearing  HENT:      Head: Normocephalic and atraumatic  Mouth/Throat:      Mouth: Mucous membranes are dry  Eyes:      Extraocular Movements: Extraocular movements intact  Neck:      Musculoskeletal: Normal range of motion and neck supple  Cardiovascular:      Rate and Rhythm: Normal rate and regular rhythm  Pulses: Normal pulses  Heart sounds: Normal heart sounds  No murmur  No friction rub  No gallop  Pulmonary:      Effort: Pulmonary effort is normal  No respiratory distress  Breath sounds: Normal breath sounds  No wheezing or rhonchi  Abdominal:      Palpations: Abdomen is soft  Tenderness: There is no abdominal tenderness  There is no guarding or rebound  Musculoskeletal: Normal range of motion  General: No swelling or tenderness  Skin:     General: Skin is warm and dry  Neurological:      General: No focal deficit present        Mental Status: She is alert    Psychiatric:         Mood and Affect: Mood normal          Additional Data:     Lab Results: I have personally reviewed pertinent reports  Results from last 7 days   Lab Units 12/28/20  2150   WBC Thousand/uL 2 64*   HEMOGLOBIN g/dL 11 8   HEMATOCRIT % 36 9   PLATELETS Thousands/uL 98*   NEUTROS PCT % 41*   LYMPHS PCT % 42   MONOS PCT % 16*   EOS PCT % 0     Results from last 7 days   Lab Units 12/28/20  2150   SODIUM mmol/L 147*   POTASSIUM mmol/L 3 9   CHLORIDE mmol/L 106   CO2 mmol/L 31   BUN mg/dL 29*   CREATININE mg/dL 1 73*   ANION GAP mmol/L 10   CALCIUM mg/dL 9 2   ALBUMIN g/dL 3 0*   TOTAL BILIRUBIN mg/dL 0 50   ALK PHOS U/L 84   ALT U/L 25   AST U/L 37   GLUCOSE RANDOM mg/dL 118                       Imaging: I have personally reviewed pertinent films in PACS    XR chest 1 view portable    (Results Pending)       EKG, Pathology, and Other Studies Reviewed on Admission:   · EKG: See AP above    Allscripts / Epic Records Reviewed: Yes     ** Please Note: This note has been constructed using a voice recognition system   **

## 2020-12-29 NOTE — EMTALA/ACUTE CARE TRANSFER
[address] [facility] [address]  [address] [facility] [address]  Dept: [phone]      EMTALA TRANSFER CONSENT    NAME Vandana SNYDER                               MRN [id number]    I have been informed of my rights regarding examination, treatment, and transfer   by Dr Marco Antonio Hammer MD    Benefits:      Risks:        Consent for Transfer:  I acknowledge that my medical condition has been evaluated and explained to me by the emergency department physician or other qualified medical person and/or my attending physician, who has recommended that I be transferred to the service of    at    The above potential benefits of such transfer, the potential risks associated with such transfer, and the probable risks of not being transferred have been explained to me, and I fully understand them  The doctor has explained that, in my case, the benefits of transfer outweigh the risks  I agree to be transferred  I authorize the performance of emergency medical procedures and treatments upon me in both transit and upon arrival at the receiving facility  Additionally, I authorize the release of any and all medical records to the receiving facility and request they be transported with me, if possible  I understand that the safest mode of transportation during a medical emergency is an ambulance and that the Hospital advocates the use of this mode of transport  Risks of traveling to the receiving facility by car, including absence of medical control, life sustaining equipment, such as oxygen, and medical personnel has been explained to me and I fully understand them  (FRANCES CORRECT BOX BELOW)  [  ]  I consent to the stated transfer and to be transported by ambulance/helicopter  [  ]  I consent to the stated transfer, but refuse transportation by ambulance and accept full responsibility for my transportation by car    I understand the risks of non-ambulance transfers and I exonerate the Hospital and its staff from any deterioration in my condition that results from this refusal     X___________________________________________    DATE  20  TIME________  Signature of patient or legally responsible individual signing on patient behalf           RELATIONSHIP TO PATIENT_________________________          Provider Certification    NAME Braulio Diaz                                         1945                              MRN [id number]    A medical screening exam was performed on the above named patient  Based on the examination:    Condition Necessitating Transfer The primary encounter diagnosis was COVID-19  Diagnoses of Hypoxia and Fever were also pertinent to this visit  Patient Condition:      Reason for Transfer:      Transfer Requirements: Facility     · Space available and qualified personnel available for treatment as acknowledged by    · Agreed to accept transfer and to provide appropriate medical treatment as acknowledged by          · Appropriate medical records of the examination and treatment of the patient are provided at the time of transfer   [address] [facility] _______  · Transfer will be performed by qualified personnel from    and appropriate transfer equipment as required, including the use of necessary and appropriate life support measures      Provider Certification: I have examined the patient and explained the following risks and benefits of being transferred/refusing transfer to the patient/family:         Based on these reasonable risks and benefits to the patient and/or the unborn child(lili), and based upon the information available at the time of the patients examination, I certify that the medical benefits reasonably to be expected from the provision of appropriate medical treatments at another medical facility outweigh the increasing risks, if any, to the individuals medical condition, and in the case of labor to the unborn child, from effecting the transfer      X____________________________________________ DATE 12/29/20        TIME_______      ORIGINAL - SEND TO MEDICAL RECORDS   COPY - SEND WITH PATIENT DURING TRANSFER

## 2020-12-29 NOTE — DISCHARGE SUMMARY
Discharge- Gregg Esquivel [birth year], 77 y o  female MRN: [id number]    Unit/Bed#: ED 09 Encounter: [id number]    Primary Care Provider: Beth Mireles DO   Date and time admitted to hospital: 12/28/2020  8:52 PM        No new Assessment & Plan notes have been filed under this hospital service since the last note was generated  Service: Hospitalist      Discharging Physician / Practitioner: Lucia Lopez MD  PCP: Beth Mireles DO  Admission Date:   Admission Orders (From admission, onward)     Ordered        12/28/20 2241  Inpatient Admission  Once                   Discharge Date: 12/29/20    Resolved Problems  Date Reviewed: 12/29/2020    None          Consultations During Hospital Stay:  · none    Procedures Performed:   · none    Significant Findings / Test Results:   Xr Chest 1 View Portable    Result Date: 12/29/2020  · Impression: No acute cardiopulmonary disease  Workstation performed: [id number]     Incidental Findings:   · none     Test Results Pending at Discharge (will require follow up):   · none     Outpatient Tests Requested:  · none    Complications:  none    Reason for Admission: covid pneumonia    Hospital Course: Gregg Esquivel is a 77 y o  female patient who originally presented to the hospital on 12/28/2020 due to shortness of breath  Past medical history significant COPD on 2 L baseline nasal cannula, chronic diastolic heart failure  She was initially diagnosed with COVID pneumonia December 24th  She presented from a nursing facility with increased oxygen requirements requiring 3 L nasal cannula initiated on mild pathway approximately 12 hours after admission patient was transferred to [facility] given [facility] was at full capacity      Please see above list of diagnoses and related plan for additional information       Condition at Discharge: stable     Discharge Day Visit / Exam:     * Please refer to separate progress note for these details *    Discussion with Family: pt    Discharge instructions/Information to patient and family:   See after visit summary for information provided to patient and family  Provisions for Follow-Up Care:  See after visit summary for information related to follow-up care and any pertinent home health orders  Disposition:     [address] [facility]  [address] Transfer to [address] [place] [address] to [facility]F:   · Not Applicable to this Patient - Not Applicable to this Patient    Planned Readmission: none     Discharge Statement:  I spent 35 minutes discharging the patient  This time was spent on the day of discharge  I had direct contact with the patient on the day of discharge  Greater than 50% of the total time was spent examining patient, answering all patient questions, arranging and discussing plan of care with patient as well as directly providing post-discharge instructions  Additional time then spent on discharge activities  Discharge Medications:  See after visit summary for reconciled discharge medications provided to patient and family        ** Please Note: This note has been constructed using a voice recognition system **

## 2020-12-29 NOTE — ED NOTES
[facility] Maryanne Sotelo RN  12/29/20 0243

## 2020-12-29 NOTE — ASSESSMENT & PLAN NOTE
· Exacerbated by COVID PNA  No wheezing noted at this time  Per records wears O22L NC at baseline   Currently requiring 3-4L NC to maintain O2 sat > 90%  · Continue home dose Adviar (equivalent) and Albuterol MDI PRN

## 2020-12-29 NOTE — ED NOTES
Patient's daughter Malissa Dingr updated on patient transfer status and provided phone number to call RN at [address] [place] [address] for patient updates        Saba Pace RN  12/29/20 2924

## 2020-12-29 NOTE — ASSESSMENT & PLAN NOTE
Wt Readings from Last 3 Encounters:   12/28/20 79 4 kg (175 lb 0 7 oz)   06/01/20 81 9 kg (180 lb 8 9 oz)   01/16/20 82 1 kg (181 lb)     · Does not appear acutely volume overloaded on exam  · Continue home dose Lasix  · Daily weight and I/O

## 2020-12-29 NOTE — ASSESSMENT & PLAN NOTE
· See AP above  · Currently afebrile and denies SOB     · Will hold off on initiating antibiotics pending procalcitonin results

## 2020-12-29 NOTE — SPEECH THERAPY NOTE
Speech-Language Pathology Bedside Swallow Evaluation        Patient Name: Lincoln Champagne    XLCJD'V Date: 12/29/2020     Problem List  Patient Active Problem List   Diagnosis    Elevated troponin    [facility]) [facility]    Chronic diastolic heart failure (HCC)    Atrial flutter ([facility])    Back pain    Dementia ([facility] )    Hyperlipidemia    Urinary tract infection associated with catheterization of urinary tract ([facility] )    RSV infection    Ambulatory dysfunction    Hypernatremia    Stage 3 chronic kidney disease    Lower extremity edema    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    JESENIA (obstructive sleep apnea)    Intermittent claudication of right lower extremity due to atherosclerosis ([facility])    Atrial fibrillation ([facility])    Chronic indwelling Davis catheter    Bradycardia    Essential hypertension    Macrocytic anemia    Aortoiliac occlusive disease ([place] [address] )    HCAP (healthcare-associated pneumonia)    Coronary artery disease involving native coronary artery of native heart without angina pectoris    Hypothyroidism    PAD (peripheral artery disease) ([place] [address] )    History of cerebral aneurysm repair    Urinary retention    TAM (acute kidney injury) ([place] [address] )    Hypertensive kidney disease with stage 3 chronic kidney disease    Azotemia    Paroxysmal atrial fibrillation ([facility])    Vomiting    Pneumonia due to COVID-19 virus    Hypoxia       Past Medical History  Past Medical History:   Diagnosis Date    Atrial fibrillation ([place] [address] )     Brain aneurysm     CAD (coronary artery disease)     Cardiac disease     had stents 12 years ago in [facility]    CKD (chronic kidney disease) stage 3, GFR 30-59 ml/min ckd    COPD (chronic obstructive pulmonary disease) ([facility])     Dementia ([facility])     Diastolic CHF, chronic ([facility])     GERD (gastroesophageal reflux disease)     Hyperlipidemia     Hyperlipidemia     Hypertension     Hypothyroidism     Migraine     PAD (peripheral artery disease) (HCC)     Renal disorder     Seizures (HCC)     Stroke [facility])        Past Surgical History  Past Surgical History:   Procedure Laterality Date    APPENDECTOMY      ARTERIOGRAM N/A 12/7/2018    Procedure: ARTERIOGRAM WITH STENT PLACEMENT;  Surgeon: Cat Reddy MD;  [facility];  Service: Vascular    BLADDER TUMOR EXCISION      BRAIN SURGERY      1998  clip right frontal lobe    CARDIAC SURGERY      COLONOSCOPY      CORONARY STENT PLACEMENT      5 stents    EYE SURGERY      IR AORTAGRAM WITH RUN-OFF  12/7/2018    MANDIBLE FRACTURE SURGERY      TONSILLECTOMY      TUBAL LIGATION      UTERINE FIBROID SURGERY           Current Medical Status  Pt is a 77 y o  female who presented to [facility] with hypoxia and fever  Sent from [facility] when O2 sat noted to be mid 80s on her baseline of 2L NC  Currently O2 sat 93-94% on 3L via MF at rest  Per ED provider NH states she tested (+) for COVID on 12/24 ST consult requested to further assess swallowing  Patient is known to this department from prior admission in Sept of 2018  At that time her oropharyngeal swallow function appeared GWFL for dysph 3 with thin liquids  Baseline diet unknown  Paitent is confused- this is not consistent with her presentation in 2018 however given her dementia I am unable to determine if this is different from her current baseline  Past medical history:  Please see H&P for details    Special Studies:  12/28 CXR: No acute cardiopulmonary disease      Social/Education/Vocational Hx:  Pt lives in SNF/ECF      Swallow Information   Current Risks for Dysphagia & Aspiration: AMS and change in respiratory status     Current Symptoms/Concerns: change in respiratory status    Current Diet: regular diet and thin liquids      Baseline Diet: unknown- last known dysph 3/thin liquids      Baseline Assessment   Behavior/Cognition: alert and decreased attention    Speech/Language Status: not able to to follow commands and limited verbal output    Patient Positioning: upright in bed      Swallow Mechanism Exam   Facial: symmetrical  Labial: unable to test 2/2 limited command following  Lingual: unable to test 2/2 limited command following  Velum: symmetrical  Mandible: adequate ROM- although reduced functional opening noted throughout  Dentition: upper dentures and edentulous lower  Vocal quality:clear/adequate   Volitional Cough: unable to initiate volitional cough   Resp: 3L NC    Consistencies Assessed and Performance   Consistencies Administered: thin liquids, puree, mechanical soft solids and mixed consistency  Specific materials administered included: applesauce, eliseo crackers, cheerios in thin milk, thin water    Oral Stage:  Decreased oral opening noted throughout with limited acceptance at times consistent with advanced dementia 2' reduced recognition of mealtime routine  Limited benefit noted from verbal cues  Labial seal and bolus stripping was reduced at times with anterior spill of liquids during presentation of mixed consistencies  Labial seal was adequate with no anterior spill with thin liquids via cup sip  Patient unable to suck via straw sip  Mastication was prolonged but adequate with the materials administered today  Bolus formation and transfer were slow but functional with no significant oral residue noted  Some bolus holding was noted at times- this improved with encouragement for self-feeding  No overt s/s reduced oral control  Pharyngeal Stage:  Swallowing initiation appeared prompt  Laryngeal rise was palpated and judged to be within functional limits  No coughing, throat clearing, change in vocal quality or respiratory status noted today  Esophageal Concerns: none reported    Summary   s/s suggestive of mild-moderate oral dysphagia- characterized primarily by reduced mealtime recognition/sequencing consistent with dementia  Aspiration risk appears low      Recommendations: mechanically altered/level 2 diet and thin liquids     Recommended Form of Meds: as tolerated     Aspiration precautions and compensatory swallowing strategies: upright posture, only feed when fully alert, slow rate of feeding, small bites/sips and alternating bites and sips as well as provide verbal cues to reorient to mealtime routine as needed    Results Reviewed with: patient, RN and MD     Dysphagia Goals: pt will tolerate dysph 2/3 with thin liquids without s/s of aspiration x3      Plan  Patient is pending transfer to [facility]- will plan to follow up there    LISA Perez , [address] [place] [address] Language Pathologist   Available via [address] [place] [address] #[id number]  [place] #[id number]

## 2020-12-29 NOTE — PLAN OF CARE
Problem: Potential for Falls  Goal: Patient will remain free of falls  Description: INTERVENTIONS:  - Assess patient frequently for physical needs  -  Identify cognitive and physical deficits and behaviors that affect risk of falls    -  [place] fall precautions as indicated by assessment   - Educate patient/family on patient safety including physical limitations  - Instruct patient to call for assistance with activity based on assessment  - Modify environment to reduce risk of injury  - Consider OT/PT consult to assist with strengthening/mobility  Outcome: Progressing

## 2020-12-29 NOTE — ED NOTES
Patient still complains of HA after given tylenol with BP  Of 180/89  SLIM provider made aware and this RN was told to give prn dilaudid to see if elevated bp is caused by the HA  Patient refuses dilaudid at this time asking to wait a little longer to see if the tylenol relieves the pain        Cheyenne Reynolds RN  12/29/20 1558

## 2020-12-29 NOTE — ASSESSMENT & PLAN NOTE
· Sent from [facility] when O2 sat noted to be mid 80s on her baseline of 2L NC  Currently O2 sat 93-94% on 3L via FM at rest  Per ED provider NH states she tested (+) for COVID on 12/24    · Initial COVID labs pending  · Incentive spirometry  · Respiratory protocol  · Initiated on mild disease pathway  · Maintain COVID precautions

## 2020-12-29 NOTE — EMTALA/ACUTE CARE TRANSFER
[facility]  [address] [place] [id number]  Dept: [phone]      EMTALA TRANSFER CONSENT    NAME Julio De La Torre                                         [birth year]                              MRN [id number]    I have been informed of my rights regarding examination, treatment, and transfer   by Dr Jaime Salguero MD    Benefits:      Risks:        Transfer Request   I acknowledge that my medical condition has been evaluated and explained to me by the emergency department physician or other qualified medical person and/or my attending physician who has recommended and offered to me further medical examination and treatment  I understand the Hospital's obligation with respect to the treatment and stabilization of my emergency medical condition  I nevertheless request to be transferred  I release the Hospital, the doctor, and any other persons caring for me from all responsibility or liability for any injury or ill effects that may result from my transfer and agree to accept all responsibility for the consequences of my choice to transfer, rather than receive stabilizing treatment at the Hospital  I understand that because the transfer is my request, my insurance may not provide reimbursement for the services  The Hospital will assist and direct me and my family in how to make arrangements for transfer, but the hospital is not liable for any fees charged by the transport service  In spite of this understanding, I refuse to consent to further medical examination and treatment which has been offered to me, and request transfer to    I authorize the performance of emergency medical procedures and treatments upon me in both transit and upon arrival at the receiving facility  Additionally, I authorize the release of any and all medical records to the receiving facility and request they be transported with me, if possible      I authorize the performance of emergency medical procedures and treatments upon me in both transit and upon arrival at the receiving facility  Additionally, I authorize the release of any and all medical records to the receiving facility and request they be transported with me, if possible  I understand that the safest mode of transportation during a medical emergency is an ambulance and that the Hospital advocates the use of this mode of transport  Risks of traveling to the receiving facility by car, including absence of medical control, life sustaining equipment, such as oxygen, and medical personnel has been explained to me and I fully understand them  (FRANCES CORRECT BOX BELOW)  [  ]  I consent to the stated transfer and to be transported by ambulance/helicopter  [  ]  I consent to the stated transfer, but refuse transportation by ambulance and accept full responsibility for my transportation by car  I understand the risks of non-ambulance transfers and I exonerate the Hospital and its staff from any deterioration in my condition that results from this refusal     X___________________________________________    DATE  20  TIME________  Signature of patient or legally responsible individual signing on patient behalf           RELATIONSHIP TO PATIENT_________________________          Provider Certification    NAME Gregg Esquivel                                        [facility] 1945                              MRN [id number]    A medical screening exam was performed on the above named patient  Based on the examination:    Condition Necessitating Transfer The primary encounter diagnosis was COVID-19  Diagnoses of Hypoxia and Fever were also pertinent to this visit      Patient Condition:      Reason for Transfer:      Transfer Requirements: Facility     · Space available and qualified personnel available for treatment as acknowledged by    · Agreed to accept transfer and to provide appropriate medical treatment as acknowledged by · Appropriate medical records of the examination and treatment of the patient are provided at the time of transfer   [facility] _______  · Transfer will be performed by qualified personnel from    and appropriate transfer equipment as required, including the use of necessary and appropriate life support measures  Provider Certification: I have examined the patient and explained the following risks and benefits of being transferred/refusing transfer to the patient/family:         Based on these reasonable risks and benefits to the patient and/or the unborn child(lili), and based upon the information available at the time of the patients examination, I certify that the medical benefits reasonably to be expected from the provision of appropriate medical treatments at another medical facility outweigh the increasing risks, if any, to the individuals medical condition, and in the case of labor to the unborn child, from effecting the transfer      X____________________________________________ DATE 12/29/20        TIME_______      ORIGINAL - SEND TO MEDICAL RECORDS   COPY - SEND WITH PATIENT DURING TRANSFER

## 2020-12-29 NOTE — ASSESSMENT & PLAN NOTE
Lab Results   Component Value Date    EGFR 29 12/28/2020    EGFR 37 06/01/2020    EGFR 34 01/05/2020    CREATININE 1 73 (H) 12/28/2020    CREATININE 1 42 (H) 06/01/2020    CREATININE 1 52 (H) 01/05/2020     · Creatine 1 73 on admission which appears to be near baseline  · Avoid hypotension and nephrotoxic agents  · BMP in am

## 2020-12-30 PROBLEM — A41.9 SEPSIS (HCC): Status: ACTIVE | Noted: 2020-12-30

## 2020-12-30 PROBLEM — J96.21 ACUTE ON CHRONIC RESPIRATORY FAILURE WITH HYPOXIA (HCC): Status: ACTIVE | Noted: 2018-06-18

## 2020-12-30 PROBLEM — R78.81 GRAM-NEGATIVE BACTEREMIA: Status: ACTIVE | Noted: 2020-12-30

## 2020-12-30 LAB
ALBUMIN SERPL BCP-MCNC: 2.8 G/DL (ref 3.5–5)
ALP SERPL-CCNC: 78 U/L (ref 46–116)
ALT SERPL W P-5'-P-CCNC: 23 U/L (ref 12–78)
ANION GAP SERPL CALCULATED.3IONS-SCNC: 7 MMOL/L (ref 4–13)
AST SERPL W P-5'-P-CCNC: 35 U/L (ref 5–45)
BASOPHILS # BLD AUTO: 0.01 THOUSANDS/ΜL (ref 0–0.1)
BASOPHILS NFR BLD AUTO: 0 % (ref 0–1)
BILIRUB SERPL-MCNC: 0.4 MG/DL (ref 0.2–1)
BUN SERPL-MCNC: 25 MG/DL (ref 5–25)
CALCIUM ALBUM COR SERPL-MCNC: 9.7 MG/DL (ref 8.3–10.1)
CALCIUM SERPL-MCNC: 8.7 MG/DL (ref 8.3–10.1)
CHLORIDE SERPL-SCNC: 106 MMOL/L (ref 100–108)
CO2 SERPL-SCNC: 30 MMOL/L (ref 21–32)
CREAT SERPL-MCNC: 1.42 MG/DL (ref 0.6–1.3)
CRP SERPL QL: 38.4 MG/L
D DIMER PPP FEU-MCNC: 0.69 UG/ML FEU
EOSINOPHIL # BLD AUTO: 0.01 THOUSAND/ΜL (ref 0–0.61)
EOSINOPHIL NFR BLD AUTO: 0 % (ref 0–6)
ERYTHROCYTE [DISTWIDTH] IN BLOOD BY AUTOMATED COUNT: 12.9 % (ref 11.6–15.1)
FERRITIN SERPL-MCNC: 1165 NG/ML (ref 8–388)
GFR SERPL CREATININE-BSD FRML MDRD: 36 ML/MIN/1.73SQ M
GLUCOSE SERPL-MCNC: 172 MG/DL (ref 65–140)
HCT VFR BLD AUTO: 39.7 % (ref 34.8–46.1)
HGB BLD-MCNC: 12.3 G/DL (ref 11.5–15.4)
IMM GRANULOCYTES # BLD AUTO: 0.04 THOUSAND/UL (ref 0–0.2)
IMM GRANULOCYTES NFR BLD AUTO: 1 % (ref 0–2)
INR PPP: 1.23 (ref 0.84–1.19)
LYMPHOCYTES # BLD AUTO: 0.62 THOUSANDS/ΜL (ref 0.6–4.47)
LYMPHOCYTES NFR BLD AUTO: 20 % (ref 14–44)
MCH RBC QN AUTO: 31.9 PG (ref 26.8–34.3)
MCHC RBC AUTO-ENTMCNC: 31 G/DL (ref 31.4–37.4)
MCV RBC AUTO: 103 FL (ref 82–98)
MONOCYTES # BLD AUTO: 0.29 THOUSAND/ΜL (ref 0.17–1.22)
MONOCYTES NFR BLD AUTO: 9 % (ref 4–12)
MRSA NOSE QL CULT: NORMAL
NEUTROPHILS # BLD AUTO: 2.1 THOUSANDS/ΜL (ref 1.85–7.62)
NEUTS SEG NFR BLD AUTO: 70 % (ref 43–75)
NRBC BLD AUTO-RTO: 0 /100 WBCS
PLATELET # BLD AUTO: 87 THOUSANDS/UL (ref 149–390)
PMV BLD AUTO: 11.7 FL (ref 8.9–12.7)
POTASSIUM SERPL-SCNC: 3.7 MMOL/L (ref 3.5–5.3)
PROCALCITONIN SERPL-MCNC: 0.1 NG/ML
PROT SERPL-MCNC: 7.2 G/DL (ref 6.4–8.2)
PROTHROMBIN TIME: 15.4 SECONDS (ref 11.6–14.5)
RBC # BLD AUTO: 3.85 MILLION/UL (ref 3.81–5.12)
SODIUM SERPL-SCNC: 143 MMOL/L (ref 136–145)
WBC # BLD AUTO: 3.07 THOUSAND/UL (ref 4.31–10.16)

## 2020-12-30 PROCEDURE — 92610 EVALUATE SWALLOWING FUNCTION: CPT

## 2020-12-30 PROCEDURE — 99223 1ST HOSP IP/OBS HIGH 75: CPT | Performed by: INTERNAL MEDICINE

## 2020-12-30 PROCEDURE — 85379 FIBRIN DEGRADATION QUANT: CPT | Performed by: INTERNAL MEDICINE

## 2020-12-30 PROCEDURE — 84145 PROCALCITONIN (PCT): CPT | Performed by: INTERNAL MEDICINE

## 2020-12-30 PROCEDURE — 86140 C-REACTIVE PROTEIN: CPT | Performed by: INTERNAL MEDICINE

## 2020-12-30 PROCEDURE — 97161 PT EVAL LOW COMPLEX 20 MIN: CPT

## 2020-12-30 PROCEDURE — 99232 SBSQ HOSP IP/OBS MODERATE 35: CPT | Performed by: INTERNAL MEDICINE

## 2020-12-30 PROCEDURE — 87186 SC STD MICRODIL/AGAR DIL: CPT | Performed by: INTERNAL MEDICINE

## 2020-12-30 PROCEDURE — 85610 PROTHROMBIN TIME: CPT | Performed by: INTERNAL MEDICINE

## 2020-12-30 PROCEDURE — XW033E5 INTRODUCTION OF REMDESIVIR ANTI-INFECTIVE INTO PERIPHERAL VEIN, PERCUTANEOUS APPROACH, NEW TECHNOLOGY GROUP 5: ICD-10-PCS | Performed by: INTERNAL MEDICINE

## 2020-12-30 PROCEDURE — 82728 ASSAY OF FERRITIN: CPT | Performed by: INTERNAL MEDICINE

## 2020-12-30 PROCEDURE — 87077 CULTURE AEROBIC IDENTIFY: CPT | Performed by: INTERNAL MEDICINE

## 2020-12-30 PROCEDURE — 80053 COMPREHEN METABOLIC PANEL: CPT | Performed by: INTERNAL MEDICINE

## 2020-12-30 PROCEDURE — 85025 COMPLETE CBC W/AUTO DIFF WBC: CPT | Performed by: INTERNAL MEDICINE

## 2020-12-30 PROCEDURE — 87086 URINE CULTURE/COLONY COUNT: CPT | Performed by: INTERNAL MEDICINE

## 2020-12-30 RX ORDER — LEVOTHYROXINE SODIUM 0.1 MG/1
100 TABLET ORAL
Status: DISCONTINUED | OUTPATIENT
Start: 2020-12-30 | End: 2021-01-04 | Stop reason: HOSPADM

## 2020-12-30 RX ORDER — FUROSEMIDE 40 MG/1
40 TABLET ORAL DAILY
Status: DISCONTINUED | OUTPATIENT
Start: 2020-12-31 | End: 2021-01-01

## 2020-12-30 RX ADMIN — FLUTICASONE FUROATE AND VILANTEROL TRIFENATATE 1 PUFF: 200; 25 POWDER RESPIRATORY (INHALATION) at 09:34

## 2020-12-30 RX ADMIN — LEVOTHYROXINE SODIUM 100 MCG: 100 TABLET ORAL at 05:59

## 2020-12-30 RX ADMIN — OXYCODONE HYDROCHLORIDE AND ACETAMINOPHEN 1000 MG: 500 TABLET ORAL at 09:32

## 2020-12-30 RX ADMIN — ZINC SULFATE 220 MG (50 MG) CAPSULE 220 MG: CAPSULE at 09:32

## 2020-12-30 RX ADMIN — ATORVASTATIN CALCIUM 20 MG: 20 TABLET, FILM COATED ORAL at 17:34

## 2020-12-30 RX ADMIN — FAMOTIDINE 10 MG: 20 TABLET, FILM COATED ORAL at 09:32

## 2020-12-30 RX ADMIN — CEFEPIME HYDROCHLORIDE 2000 MG: 2 INJECTION, POWDER, FOR SOLUTION INTRAVENOUS at 21:24

## 2020-12-30 RX ADMIN — DEXAMETHASONE SODIUM PHOSPHATE 6 MG: 4 INJECTION, SOLUTION INTRAMUSCULAR; INTRAVENOUS at 02:00

## 2020-12-30 RX ADMIN — ACETAMINOPHEN 975 MG: 325 TABLET, FILM COATED ORAL at 17:34

## 2020-12-30 RX ADMIN — CARBAMAZEPINE 100 MG: 100 TABLET, CHEWABLE ORAL at 09:33

## 2020-12-30 RX ADMIN — CARBAMAZEPINE 100 MG: 100 TABLET, CHEWABLE ORAL at 21:27

## 2020-12-30 RX ADMIN — DIGOXIN 125 MCG: 125 TABLET ORAL at 09:32

## 2020-12-30 RX ADMIN — POLYETHYLENE GLYCOL 3350 17 G: 17 POWDER, FOR SOLUTION ORAL at 09:33

## 2020-12-30 RX ADMIN — Medication 2000 UNITS: at 09:32

## 2020-12-30 RX ADMIN — ACETAMINOPHEN 975 MG: 325 TABLET, FILM COATED ORAL at 09:31

## 2020-12-30 RX ADMIN — APIXABAN 2.5 MG: 2.5 TABLET, FILM COATED ORAL at 09:33

## 2020-12-30 RX ADMIN — ACETAMINOPHEN 975 MG: 325 TABLET, FILM COATED ORAL at 02:16

## 2020-12-30 RX ADMIN — METOPROLOL TARTRATE 100 MG: 100 TABLET, FILM COATED ORAL at 09:32

## 2020-12-30 RX ADMIN — OXYCODONE HYDROCHLORIDE AND ACETAMINOPHEN 1000 MG: 500 TABLET ORAL at 20:39

## 2020-12-30 RX ADMIN — LORAZEPAM 0.5 MG: 0.5 TABLET ORAL at 21:27

## 2020-12-30 RX ADMIN — DIVALPROEX SODIUM 250 MG: 250 TABLET, DELAYED RELEASE ORAL at 20:39

## 2020-12-30 RX ADMIN — BUPROPION 150 MG: 150 TABLET, EXTENDED RELEASE ORAL at 09:33

## 2020-12-30 RX ADMIN — APIXABAN 2.5 MG: 2.5 TABLET, FILM COATED ORAL at 17:34

## 2020-12-30 RX ADMIN — METOPROLOL TARTRATE 100 MG: 100 TABLET, FILM COATED ORAL at 21:26

## 2020-12-30 RX ADMIN — DIVALPROEX SODIUM 250 MG: 250 TABLET, DELAYED RELEASE ORAL at 09:32

## 2020-12-30 RX ADMIN — MELATONIN TAB 3 MG 6 MG: 3 TAB at 21:26

## 2020-12-30 RX ADMIN — SENNOSIDES 8.6 MG: 8.6 TABLET ORAL at 09:33

## 2020-12-30 RX ADMIN — HYDROMORPHONE HYDROCHLORIDE 2 MG: 2 TABLET ORAL at 13:51

## 2020-12-30 RX ADMIN — CARBAMAZEPINE 100 MG: 100 TABLET, CHEWABLE ORAL at 17:34

## 2020-12-30 RX ADMIN — ASPIRIN 81 MG CHEWABLE TABLET 81 MG: 81 TABLET CHEWABLE at 09:33

## 2020-12-30 RX ADMIN — REMDESIVIR 200 MG: 100 INJECTION, POWDER, LYOPHILIZED, FOR SOLUTION INTRAVENOUS at 12:24

## 2020-12-30 RX ADMIN — HYDROMORPHONE HYDROCHLORIDE 2 MG: 2 TABLET ORAL at 22:30

## 2020-12-30 RX ADMIN — NYSTATIN: 100000 POWDER TOPICAL at 09:34

## 2020-12-30 NOTE — PLAN OF CARE
Pt  Will tolerate Dys 3 diet with thin liquids without s/s of aspiration over 3-4 sessions or as indicted by treating SLP  Pt  Will trial advanced materials with SLP for possible diet upgrade  SLP to determine if VBS with speech is indicated

## 2020-12-30 NOTE — SPEECH THERAPY NOTE
Speech Language/Pathology  Speech Language/Pathology  Speech-Language Pathology Bedside Swallow Evaluation        Patient Name: Cm Mendez    QCOWH'A Date: 12/30/2020     Problem List  Principal Problem:    COVID-19  Active Problems:    Elevated troponin    COPD (chronic obstructive pulmonary disease) (HCC)    Chronic diastolic heart failure (HCC)    Hyperlipidemia    Ambulatory dysfunction    Atrial fibrillation (HCC)    Chronic indwelling Davis catheter    Acute on chronic respiratory failure with hypoxia (HCC)    Essential hypertension    Coronary artery disease involving native coronary artery of native heart without angina pectoris    Hypothyroidism    Gram-negative bacteremia           Past Medical History  Past Medical History:   Diagnosis Date    Atrial fibrillation ([address] )     Brain aneurysm     CAD (coronary artery disease)     Cardiac disease     [facility]    CKD (chronic kidney disease) stage 3, GFR 30-59 ml/min ckd    COPD (chronic obstructive pulmonary disease) (HCC)     Dementia (HCC)     Diastolic CHF, chronic (HCC)     GERD (gastroesophageal reflux disease)     Hyperlipidemia     Hyperlipidemia     Hypertension     Hypothyroidism     Migraine     PAD (peripheral artery disease) (HCC)     Renal disorder     [facility]ures [facility]     Stroke [facility])        Past Surgical History  Past Surgical History:   Procedure Laterality Date    APPENDECTOMY      ARTERIOGRAM N/A 12/7/2018    Procedure: ARTERIOGRAM WITH STENT PLACEMENT;  Surgeon: Anne-Marie Reddy MD;  Location: [facility];  Service: Vascular    BLADDER TUMOR EXCISION      BRAIN SURGERY      1998  clip right frontal lobe    CARDIAC SURGERY      COLONOSCOPY      CORONARY STENT PLACEMENT      5 stents    EYE SURGERY      IR AORTAGRAM WITH RUN-OFF  12/7/2018    MANDIBLE FRACTURE SURGERY      TONSILLECTOMY      TUBAL LIGATION      UTERINE FIBROID SURGERY         Summary    Pt presents with minimal-mild oropharyngeal dysphagia on the consistencies trialed today  She does exhibit some long term and short term memory loss as well, however it is unclear what her baseline is  Would benefit from a Dys 3 diet due to edentulous mandible and inability to ask for assistance if needed  Recommendations:   Diet: soft/level 3 diet and thin liquids   Meds: as tolerated   Frequent Oral care: 4x/day  Aspiration precautions and compensatory swallowing strategies: upright posture, small bites/sips and alternating bites and sips  Other Recommendations/ considerations: none       Current Medical Status  Pt is a 77 y o  female who presented to [facility] transferred from [address] [place] [address] who presented with hypoxia and fever  Sent from [facility] when O2 sat noted to be mid 80s on her baseline of 2L NC  Currently O2 sat 93-94% on 3L via MF at rest  Per ED provider NH states she tested (+) for COVID on 12/24  According to SLP at [facility]: Patient is known to this department from prior admission in Sept of 2018  At that time her oropharyngeal swallow function appeared GWFL for dysph 3 with thin liquids  Baseline diet unknown  Paitent is confused- this is not consistent with her presentation in 2018 however given her dementia I am unable to determine if this is different from her current baseline         Past medical history:   Please see H&P for details    Special Studies:  12/28 CXR: No acute cardiopulmonary disease  Social/Education/Vocational Hx:  Pt lives in SNF/ECF  Pt  Was unable to name her children  With max cues she was able to name 4, however could not tell me if there are more  She states that she lives in [place], [place]  Reportedly she is from a SNF, however she could not tell me which one       Swallow Information   Current Risks for Dysphagia & Aspiration: known history of dysphagia  Current Symptoms/Concerns: COVID+  Current Diet: soft/level 3 diet and thin liquids   Baseline Diet: soft/level 3 diet and thin liquids    Baseline Assessment   Behavior/Cognition: alert  Speech/Language Status: able to participate in basic conversation and able to follow commands  Patient Positioning: upright in bed     Swallow Mechanism Exam   Facial: symmetrical  Labial: WFL  Lingual: WFL  Velum: unable to visualize  Mandible: adequate ROM  Dentition: upper dentures, edentulous lower  Vocal quality:clear/adequate   Volitional Cough: strong/productive   Respiratory: room air    Consistencies Assessed and Performance   Consistencies Administered: thin liquids and hard solids  Pt  Was previously evaluated at [facility] with thin liquids, puree, mechanical soft solids and mixed consistency    Oral Stage: grossly WFL  Orientation, bolus formation, and transfer were adequate and timely with materials trialed today  No significant oral residue was noted  No pocketing was noted  No overt s/s of reduced oral control  Pt  Denies difficulties with mastication, however she also thought that she had dentures on her mandible which she did not  Pharyngeal Stage: grossly WFL  Swallow initiation appeared prompt  Laryngeal rise was palpated and judged to be within functional limits  No coughing, throat clearing, change in vocal quality, or respiratory status noted with today's materials      Esophageal Concerns: none reported      Results Reviewed with: MD   Dysphagia Goals: pt will tolerate Dys 3 consistency with thin liquids without s/s of aspiration x1-2 sessions  Discharge recommendation: likely no follow up needed for swallowing    Speech Therapy Prognosis   Prognosis: good and fair    Prognosis Considerations: age, medical status and cognitive status     Nida Jeffery MS CCC-SLP  Speech Language Pathologist  Available via [facility] License # [id number]  [address] # [id number]

## 2020-12-30 NOTE — H&P
History and Physical - [address] [place] [address] Internal Medicine    Patient Information: Britni Hwang 77 y o  female MRN: [id number]  Unit/Bed#: [address] [place] [address] [id number] Encounter: [id number]  Admitting Physician: Chata Kingsley MD  PCP: Gabriele Cheatham DO  Date of Admission:  12/30/20        Hospital Problem List:     Principal Problem:    COVID-19  Active Problems:    Acute on chronic respiratory failure with hypoxia (HCC)    Gram-negative bacteremia    COPD (chronic obstructive pulmonary disease) ([address] )    Chronic diastolic heart failure (HCC)    Atrial fibrillation (HCC)    Elevated troponin    Chronic indwelling Davis catheter    Coronary artery disease involving native coronary artery of native heart without angina pectoris    Hypothyroidism    Hyperlipidemia    Ambulatory dysfunction    Essential hypertension      Assessment/Plan:    Gram-negative bacteremia  Assessment & Plan  Patient was noted to have 1/2 blood culture positive for Gram-negative rods  Likely urinary source  · Will check UA, urine culture  · IV cefepime  · Monitor clinically  · Consider abdominal imaging  · Infectious disease evaluation    Acute on chronic respiratory failure with hypoxia [facility])  Assessment & Plan  Patient is on chronic supplemental oxygen about 2 L  Noted to be hypoxic to mid 80s on 2 L of supplemental oxygen  Currently saturating in mid to high 90s on 4-5 L of supplemental oxygen  · Continue treatment as above  · Continue supplemental oxygen and titrate as tolerated to maintain oxygenation more than 92%  · Incentive spirometry, activity as tolerated  · PT/OT    * COVID-19  Assessment & Plan  Recently diagnosed COVID-19 on 12/24  Presented to ED with fever and hypoxia saturating in mid 80s in 2 L of chronic supplemental oxygen  Mild disease based on oxygen  3-5 L requirement on presentation, patient uses 2 L of supplemental oxygen at baseline  CXR - without any acute abnormality  SARS-CoV-2 PCR positive-at outside facility on 12/24, retested 12/29  · Medication treatment started per COVID protocol:  · Dexamethasone 6 mg IV daily for 10 days  · Famotidine 20 mg IV b i d   · Doxycycline 100 mg p o  Q 12 hours, ceftriaxone 1 g daily to rule out superimposed bacterial infection  · Hold Remdesivir given creatinine clearance less than 30  · Check and trend inflammatory markers, CRP, ferritin  ·   · Check troponin, proBNP, D-dimer    · Daily CBC, CMP  · Trend procalcitonin  · Patient was educated and encouraged self proing  · Increase activity and mobilization as tolerated  · PT/OT as needed      Results from last 7 days   Lab Units 12/29/20  0932   SARS-COV-2  Positive*     Results from last 7 days   Lab Units 12/29/20  0215 12/28/20  2150   CRP mg/L 55 1*  --    FERRITIN ng/mL 1,171*  --    D-DIMER QUANTITATIVE ug/ml FEU  --  1 02*      Results from last 7 days   Lab Units 12/29/20  0215   PROCALCITONIN ng/ml 0 08      Results from last 7 days   Lab Units 12/29/20  0443 12/29/20  0215 12/28/20  2150   TROPONIN I ng/mL 0 06* 0 06* 0 05*         Atrial fibrillation (HCC)  Assessment & Plan  Rate controlled  Continue metoprolol, digoxin, Eliquis  Follow-up digoxin level    Chronic diastolic heart failure (HCC)  Assessment & Plan  Wt Readings from Last 3 Encounters:   12/28/20 79 4 kg (175 lb 0 7 oz)   06/01/20 81 9 kg (180 lb 8 9 oz)   01/16/20 82 1 kg (181 lb)       Prior echocardiogram with EF 45%  Currently appears euvolemic  Continue metoprolol  Hold Lasix today      COPD (chronic obstructive pulmonary disease) (HCC)  Assessment & Plan  No evidence of exacerbation  Chest x-ray unremarkable  Continue bronchodilators, Breo  Supplemental oxygen  Monitor respiratory status    Hypothyroidism  Assessment & Plan  Resume Synthroid  Follow-up thyroid profile    Coronary artery disease involving native coronary artery of native heart without angina pectoris  Assessment & Plan  Continue aspirin, metoprolol, statin    Chronic indwelling Davis catheter  Assessment & Plan  Patient with chronic indwelling Davis catheter  Likely source of Gram-negative bacteremia  Check UA, urine culture    Elevated troponin  Assessment & Plan  Likely non MI troponin elevation in setting acute infection, hypoxia and renal insufficiency  Trend remains flat  Patient denies any chest pain  Continue medical management    Essential hypertension  Assessment & Plan  Stable  Continue metoprolol    Ambulatory dysfunction  Assessment & Plan  PT/OT    Hyperlipidemia  Assessment & Plan  Continue statin          VTE Prophylaxis: Apixaban (Eliquis)  / sequential compression device   Code Status: Level 1 - Full Code, discussed with daughter    Anticipated Length of Stay:  Patient will be admitted on an Inpatient basis with an anticipated length of stay of  > 2 midnights  Justification for Hospital Stay:  [place] with hypoxia    Total Time for Visit, including Counseling / Coordination of Care: 45 minutes  Greater than 50% of this total time spent on direct patient counseling and coordination of care  History of Present Illness: Mayela Morrissey is a 77 y o  female with multiple comorbidities including CAD status post stent, diastolic CHF, COPD on 2 L of supplemental oxygen,  AFib, hypertension, dyslipidemia, hypothyroidism CKD, history of brain aneurysm, seizure disorder, dementia, GERD, peripheral artery disease, chronic indwelling catheter who initially presented to ED at [facility] with fever of 102 2° F, increasing lethargy and hypoxia  As per the records patient was tested positive for COVID on 12/24  In ED, patient was noted to be saturating in mid 80s at her baseline 2 L of supplemental oxygen  Patient was subsequently admitted but transferred to [address] [place] [address] due to bed availability  Patient was seen and examined, currently lying comfortably in bed knows that she is in the hospital but not able to provide details of the presentation    She denies any chest pain shortness of breath or cough reports having intermittent headache  After the transfer patient was noted to be afebrile, hemodynamically stable saturating in high 90s on 5 L of supplemental oxygen  1/2 blood culture noted to be positive for Gram-negative rods from admission blood cultures  Review of Systems:    Review of Systems   Unable to perform ROS: Dementia       Past Medical and Surgical History:     Past Medical History:   Diagnosis Date    Atrial fibrillation ([facility] )     Brain aneurysm     CAD (coronary artery disease)     Cardiac disease     had stents 12 years ago in [facility]    CKD (chronic kidney disease) stage 3, GFR 30-59 ml/min ckd    COPD [facility]ch[facility] obstructive [facility] disease) ([facility])     Dementia ([facility])     Diastolic CHF, chronic ([facility])     GERD (gastroesophageal reflux disease)     Hyperlipidemia     Hyperlipidemia     Hypertension     Hypothyroidism     Migraine     PAD (peripheral artery disease) ([facility])     Renal disorder     Seizures ([facility] )     Stroke [facility])        Past Surgical History:   Procedure Laterality Date    APPENDECTOMY      ARTERIOGRAM N/A 12/7/2018    Procedure: ARTERIOGRAM WITH STENT PLACEMENT;  Surgeon: Maite Reddy MD;  [facility]: [facility];  Service: Vascular    BLADDER TUMOR EXCISION      BRAIN SURGERY      1998  clip right frontal lobe    CARDIAC SURGERY      COLONOSCOPY      CORONARY STENT PLACEMENT      5 stents    EYE SURGERY      IR AORTAGRAM WITH RUN-OFF  12/7/2018    MANDIBLE FRACTURE SURGERY      TONSILLECTOMY      TUBAL LIGATION      UTERINE FIBROID SURGERY         Meds/Allergies:    PTA meds:   Prior to Admission Medications   Prescriptions Last Dose Informant Patient Reported? Taking?    Acetaminophen 325 MG CAPS 12/28/2020 at Unknown time Outside Facility ([facility]) Yes Yes   Sig: Take by mouth   Cholecalciferol (VITAMIN D) 2000 units CAPS Unknown at Unknown time Outside Facility (Specify) No No   Sig: Take 1 capsule (2,000 Units total) by mouth daily   Citric Fl-Ayjsjyjrwme-Bh Carb (RENACIDIN) SOLN Unknown at Unknown time Outside Facility (Specify) Yes No   Sig: Indications: 60ml to irrigate baca once a week on Friday   Hydromorphone HCl 1 MG/ML LIQD Unknown at Unknown time  Yes No   Sig: Take 2 5 mg by mouth every 3 (three) hours as needed   Hyoscyamine Sulfate (HYOSCYAMINE PO) Unknown at Unknown time  Yes No   Sig: Take 0 125 mg by mouth every 4 (four) hours as needed (secretions)   LORazepam (ATIVAN) 0 5 mg tablet   No No   Sig: Take 1 tablet (0 5 mg total) by mouth 2 (two) times a day for 5 doses   LORazepam (ATIVAN) 0 5 mg tablet Unknown at Unknown time  Yes No   Si 5 mg daily at bedtime    albuterol (PROVENTIL HFA,VENTOLIN HFA) 90 mcg/act inhaler 2020 at Unknown time Outside Facility (Specify) No Yes   Sig: Inhale 2 puffs every 6 (six) hours as needed for wheezing   allopurinol (ZYLOPRIM) 300 mg tablet Unknown at Unknown time  Yes No   apixaban (Eliquis) 2 5 mg 2020 at Unknown time  Yes Yes   Sig: Take 2 5 mg by mouth 2 (two) times a day For 14 days   aspirin 81 mg chewable tablet 2020 at Unknown time  Yes Yes   Sig: Chew 81 mg daily   atorvastatin (LIPITOR) 10 mg tablet Unknown at Unknown time Outside Facility (Specify) No No   Sig: Take 1 tablet (10 mg total) by mouth daily   betamethasone dipropionate (DIPROSONE) 0 05 % cream Unknown at Unknown time Outside Facility (Specify) Yes No   Sig: Apply topically 3 (three) times a day   bisacodyl (DULCOLAX) 10 mg suppository Unknown at Unknown time  Yes No   Sig: Insert 10 mg into the rectum daily as needed for constipation   buPROPion (WELLBUTRIN SR) 150 mg 12 hr tablet 2020 at Unknown time Outside Facility (Specify) No Yes   Sig: Take 1 tablet (150 mg total) by mouth daily   carBAMazepine (TEGretol) 100 mg chewable tablet 2020 at Unknown time Outside Facility (Specify) No Yes   Sig: Chew 1 tablet (100 mg total) 3 (three) times a day   clopidogrel (PLAVIX) 75 mg tablet Unknown at Unknown time Outside Facility (Specify) No No   Sig: Take 1 tablet (75 mg total) by mouth daily   digoxin (LANOXIN) 0 125 mg tablet 12/28/2020 at Unknown time Outside Facility ([address] [place] [address]) Yes Yes   Sig: Take 125 mcg by mouth daily   divalproex sodium (DEPAKOTE) 250 mg EC tablet 12/28/2020 at Unknown time  Yes Yes   Sig: Take 250 mg by mouth every 12 (twelve) hours    famotidine (PEPCID) 20 mg tablet Unknown at Unknown time  Yes No   fenofibrate micronized (LOFIBRA) 67 MG capsule Unknown at Unknown time Outside Facility (Specify) No No   Sig: Take 1 capsule (67 mg total) by mouth daily with breakfast   Patient taking differently: Take 134 mg by mouth daily with breakfast    ferrous sulfate 325 (65 Fe) mg tablet Unknown at Unknown time Outside Facility (Specify) No No   Sig: Take 1 tablet (325 mg total) by mouth 2 (two) times a day   fluticasone-salmeterol (ADVAIR, WIXELA) 250-50 mcg/dose inhaler 12/28/2020 at Unknown time  No Yes   Sig: Inhale 1 puff 2 (two) times a day Rinse mouth after use     furosemide (LASIX) 40 mg tablet 12/28/2020 at Unknown time Outside Facility (Specify) No Yes   Sig: Take 1 tablet (40 mg total) by mouth 2 (two) times a day   Patient taking differently: Take 40 mg by mouth daily    haloperidol (HALDOL) 1 mg tablet 12/28/2020 at Unknown time  Yes Yes   Sig: Take 1 mg by mouth as needed for agitation   ipratropium-albuterol (DUO-NEB) 0 5-2 5 mg/3 mL nebulizer solution 12/28/2020 at Unknown time Outside Facility ([address] [place] [address]) Yes Yes   Sig: Take 3 mL by nebulization 4 (four) times a day   lactobacillus acidophilus Unknown at Unknown time Outside Facility (Specify) Yes No   Sig: Take 1 capsule by mouth daily   levothyroxine 100 mcg tablet Unknown at Unknown time  Yes No   melatonin 3 mg 12/28/2020 at Unknown time  Yes Yes   Sig: Take 6 mg by mouth daily at bedtime   metoprolol tartrate (LOPRESSOR) 100 mg tablet 12/28/2020 at Unknown time Outside Facility (Specify) No Yes   Sig: Take 1 tablet (100 mg total) by mouth every 12 (twelve) hours   nitroglycerin (NITROSTAT) 0 4 mg SL tablet Unknown at Unknown time Outside Facility (Specify) No No   Sig: Place 1 tablet (0 4 mg total) under the tongue every 5 (five) minutes as needed for chest pain   nystatin (MYCOSTATIN) powder Unknown at Unknown time  Yes No   Sig: Apply topically as needed (abdominal irritation)   polyethylene glycol (MIRALAX) 17 g packet Unknown at Unknown time  Yes No   Sig: Take 17 g by mouth daily   potassium chloride (K-DUR,KLOR-CON) 20 mEq tablet Unknown at Unknown time Outside Facility (Specify) No No   Sig: Take 1 tablet (20 mEq total) by mouth daily   prochlorperazine (COMPAZINE) 10 mg tablet Unknown at Unknown time  Yes No   Sig: Take 10 mg by mouth every 6 (six) hours as needed for nausea or vomiting   senna (SENOKOT) 8 6 MG tablet 2020 at Unknown time  Yes Yes   Sig: Take 1 tablet by mouth daily   warfarin (COUMADIN) 1 mg tablet Unknown at Unknown time  Yes No   warfarin (COUMADIN) 2 5 mg tablet Unknown at Unknown time  Yes No   warfarin (COUMADIN) 3 mg tablet Unknown at Unknown time Outside Facility (Specify) No No   Sig: One tablet daily or as otherwise directed  Facility-Administered Medications: None       Allergies:    Allergies   Allergen Reactions    Dye [Iodinated Diagnostic Agents] Other (See Comments)     Affects kidneys    Spiriva Handihaler [Tiotropium Bromide Monohydrate] Swelling    Ramipril Facial Swelling    Penicillins      unsure     History:     Marital Status:      Substance Use History:   Social History     Substance and Sexual Activity   Alcohol Use Never    Alcohol/week: 0 0 standard drinks    Frequency: Never     Social History     Tobacco Use   Smoking Status Former Smoker    Packs/day: 5 00    Years: 40 00    Pack years: 200 00    Quit date: 2007    Years since quittin 3   Smokeless Tobacco Never Used     Social History     Substance and Sexual Activity   Drug Use No Family History:    Family History   Problem Relation Age of Onset    Heart disease Father     Parkinsonism Father     Macular degeneration Mother     Glaucoma Mother     Diabetes Brother     Heart disease Brother        Physical Exam:     Vitals:   Blood Pressure: 152/59 (12/29/20 2328)  Pulse: 61 (12/29/20 2328)  Temperature: 98 °F (36 7 °C) (12/29/20 2328)  Temp Source: Oral (12/29/20 2328)  Respirations: 12 (12/29/20 2328)  SpO2: 95 % (12/29/20 2328)    Physical Exam  Constitutional:       General: She is not in acute distress  HENT:      Head: Atraumatic  Eyes:      Pupils: Pupils are equal, round, and reactive to light  Neck:      Musculoskeletal: Neck supple  Cardiovascular:      Rate and Rhythm: Normal rate and regular rhythm  Pulmonary:      Effort: Pulmonary effort is normal  No respiratory distress  Breath sounds: Normal breath sounds  No wheezing or rales  Comments: Diminished bilaterally  Abdominal:      General: Bowel sounds are normal  There is no distension  Palpations: Abdomen is soft  Tenderness: There is no abdominal tenderness  There is no guarding or rebound  Genitourinary:     Comments: Chronic Davis catheter in place  Musculoskeletal:      Right lower leg: Edema (Trace) present  Left lower leg: Edema (Trace) present  Skin:     General: Skin is warm and dry  Findings: No rash  Neurological:      Mental Status: She is alert  Mental status is at baseline  Cranial Nerves: No cranial nerve deficit  Psychiatric:         Cognition and Memory: Cognition is impaired  Lab Results: I have personally reviewed pertinent reports        Results from last 7 days   Lab Units 12/29/20  0441   WBC Thousand/uL 2 75*   HEMOGLOBIN g/dL 11 5   HEMATOCRIT % 36 8   PLATELETS Thousands/uL 87*   NEUTROS PCT % 33*   LYMPHS PCT % 53*   MONOS PCT % 13*   EOS PCT % 0     Results from last 7 days   Lab Units 12/29/20  0441   POTASSIUM mmol/L 3 8 CHLORIDE mmol/L 107   CO2 mmol/L 28   BUN mg/dL 29*   CREATININE mg/dL 1 67*   CALCIUM mg/dL 8 7   ALK PHOS U/L 78   ALT U/L 24   AST U/L 36           Imaging: I have personally reviewed pertinent reports  Xr Chest 1 View Portable    Result Date: 12/29/2020  Narrative: CHEST INDICATION:   sob  Patient has confirmed COVID-19  COMPARISON:  December 23, 2019  EXAM PERFORMED/VIEWS:  XR CHEST PORTABLE  AP semierect FINDINGS: Cardiomediastinal silhouette appears unremarkable  The lungs are clear  No pneumothorax or pleural effusion  Osseous structures appear within normal limits for patient age  Impression: No acute cardiopulmonary disease  Workstation performed: [id number]       No orders to display       EKG, Pathology, and Other Studies Reviewed on Admission:   · EKG-normal sinus rhythm at 62 pressure per minute  Baseline artifact nonspecific inferolateral ST T-wave abnormality  Allscripts/EPIC Records Reviewed: Yes     ** Please Note: "This note has been constructed using a voice recognition system  Therefore there may be syntax, spelling, and/or grammatical errors   Please call if you have any questions  "**

## 2020-12-30 NOTE — ASSESSMENT & PLAN NOTE
Patient was noted to have 1/2 blood culture positive for Gram-negative rods  Likely urinary source  · Follow-up UA, urine culture  · Continue IV cefepime  · Monitor clinically  · Consider abdominal imaging if urine culture is inconsistent with blood culture    · Infectious disease evaluation appreciated

## 2020-12-30 NOTE — ASSESSMENT & PLAN NOTE
Patient with chronic indwelling Davis catheter  Likely source of Gram-negative bacteremia  Follow-up UA, urine culture  Consider cath exchange; patient reluctant when discussed

## 2020-12-30 NOTE — ASSESSMENT & PLAN NOTE
Patient is on chronic supplemental oxygen about 2 L  Noted to be hypoxic to mid 80s on 2 L of supplemental oxygen  · Now improved, Currently saturating in low to mid 90s on 2 l nc  · Continue treatment as above  · Incentive spirometry, activity as tolerated  · PT/OT

## 2020-12-30 NOTE — PLAN OF CARE
Problem: Potential for Falls  Goal: Patient will remain free of falls  Description: INTERVENTIONS:  - Assess patient frequently for physical needs  -  Identify cognitive and physical deficits and behaviors that affect risk of falls    -  [place] fall precautions as indicated by assessment   - Educate patient/family on patient safety including physical limitations  - Instruct patient to call for assistance with activity based on assessment  - Modify environment to reduce risk of injury  - Consider OT/PT consult to assist with strengthening/mobility  12/29/2020 1938 by Radha Colby RN  Outcome: Progressing  12/29/2020 1806 by Radha Colby RN  Outcome: Progressing

## 2020-12-30 NOTE — ASSESSMENT & PLAN NOTE
Wt Readings from Last 3 Encounters:   12/30/20 79 5 kg (175 lb 4 3 oz)   12/28/20 79 4 kg (175 lb 0 7 oz)   06/01/20 81 9 kg (180 lb 8 9 oz)       Prior echocardiogram with EF 45%  Currently appears euvolemic  Continue metoprolol  Resume Lasix in a m

## 2020-12-30 NOTE — OCCUPATIONAL THERAPY NOTE
Occupational Therapy Screen       12/30/20 9637   Note Type   Note type Screen  (Patient dependent at baseline)   Assessment   Assessment OT orders received  Chart reviewed  Per review and per PT conversation with patient facility, patient is dependent at baseline for all ADLs including feeding  patient currently presents at baseline functional status, no skilled inpatient OT services indicated at this time  OT orders to be discharged      Licensure   [place] License Number  [place], [place] [id number]

## 2020-12-30 NOTE — ASSESSMENT & PLAN NOTE
Recently diagnosed COVID-19 on 12/24  Presented to ED with fever and hypoxia saturating in mid 80s in 2 L of chronic supplemental oxygen  Mild disease based on oxygen  3-5 L requirement on presentation, patient uses 2 L of supplemental oxygen at baseline  CXR - without any acute abnormality  SARS-CoV-2 PCR positive-at outside facility on 12/24, retested 12/29  Hypoxia has improved currently saturating adequately on room air  Patient does not demonstrate significant pulmonary symptoms at present but remains at high risk of severe COVID-19 infection due to age and comorbidities    · Medication treatment started per COVID protocol:  · Dexamethasone 6 mg IV daily for 10 days  · Famotidine daily  · Started Remdesivir given improvement in renal function  · Check and trend inflammatory markers, CRP, ferritin  · Significantly elevated but downtrending  · Check troponin, proBNP, D-dimer  · Troponin minimally elevated and flat  · ProBNP 1 989  · D-dimer mildly elevated, downtrending    · Daily CBC, CMP  · Procalcitonin negative x2  · Encourage  proing if tolerated  · Increase activity and mobilization as tolerated, limited at baseline      Results from last 7 days   Lab Units 12/29/20  0932   SARS-COV-2  Positive*     Results from last 7 days   Lab Units 12/30/20  0454 12/29/20  0215 12/28/20  2150   CRP mg/L 38 4* 55 1*  --    FERRITIN ng/mL 1,165* 1,171*  --    D-DIMER QUANTITATIVE ug/ml FEU 0 69*  --  1 02*      Results from last 7 days   Lab Units 12/29/20  0215   PROCALCITONIN ng/ml 0 08      Results from last 7 days   Lab Units 12/29/20  0443 12/29/20  0215 12/28/20  2150   TROPONIN I ng/mL 0 06* 0 06* 0 05*

## 2020-12-30 NOTE — ASSESSMENT & PLAN NOTE
Likely non MI troponin elevation in setting acute infection, hypoxia and renal insufficiency  Trend remains flat  Patient denies any chest pain  Continue medical management

## 2020-12-30 NOTE — PHYSICAL THERAPY NOTE
PT EVALUATION       12/30/20 1120   PT Last Visit   PT Visit Date 12/30/20   Note Type   Note type Evaluation   Pain Assessment   Pain Assessment Tool Pain Assessment not indicated - pt denies pain   Home Living   Type of Home Assisted living   Additional Comments Has been at [facility] personal care home for "years" per staff at this facility   Prior Function   Level of [place] Needs assistance with ADLs and functional mobility; Total dependent   Lives With Facility staff   Receives Help From Personal care attendant   ADL Assistance Needs assistance   IADLs Needs assistance   Comments Pt is wheelchair bound/ non ambulatory; dependent for transfers; needs assist with feeding; dependent for all ADLs  Restrictions/Precautions   Other Precautions Contact/isolation; Airborne/isolation; Chair Alarm; Bed Alarm;O2;Fall Risk   General   Additional Pertinent History Dementia;  Covid +   Cognition   Overall Cognitive Status Impaired   Arousal/Participation Alert   Orientation Level Oriented to person   Following Commands Follows one step commands inconsistently   RUE Assessment   RUE Assessment WFL  (passive only)   LUE Assessment   LUE Assessment WFL  (passive only)   RLE Assessment   RLE Assessment   (per CNA pt had legs flexed; PT could not get knee to flex)   LLE Assessment   LLE Assessment WFL  (passive only)   Bed Mobility   Rolling R 2  Maximal assistance   Rolling L 2  Maximal assistance   Supine to Sit 2  Maximal assistance  (Could not get pt to complete short sit: unsafe : need assist)   Sit to Supine 2  Maximal assistance  (could not get pt completely to short sit without assist of 2)   Additional Comments Unsafe to attempt standing    Transfers   Sit to Stand Unable to assess   Ambulation/Elevation   Gait Assistance Not tested   Balance   Static Sitting Poor   Static Standing Zero   Ambulatory Zero   Activity Tolerance   Activity Tolerance Treatment limited secondary to medical complications (Comment)   Nurse Made Aware yes: Carisa   Assessment   Prognosis Fair   Problem List Decreased strength;Decreased endurance; Impaired balance;Decreased mobility; Decreased coordination;Decreased cognition; Impaired judgement;Decreased safety awareness   Assessment Patient seen for Physical Therapy evaluation  Patient admitted with COVID-19  Comorbidities affecting patient's physical performance include: Dementia, CAD, AFib, brain Aneurysm, CKD, COPD, HTN, PAD, seizures, CVA , 5 coronary stents  Personal factors affecting patient at time of initial evaluation include: inability to ambulate household distances, decreased cognition, decreased initiation and engagement, inability to perform physical activity, limited insight into impairments, decreased social skills, inability to perform ADLS and inability to perform IADLS   Prior to admission, patient was dependent for mobility, requiring assist for ADLS, requiring assist for IADLS, a resident of long term care and is wheelchair bound  Please find objective findings from Physical Therapy assessment regarding body systems outlined above with impairments and limitations including weakness, impaired balance, decreased endurance, impaired coordination, decreased activity tolerance, decreased functional mobility tolerance, decreased safety awareness, impaired judgement, fall risk and decreased cognition  The Barthel Index was used as a functional outcome tool presenting with a score of 0 today indicating marked limitations of functional mobility and ADLS  Patient's clinical presentation is currently evolving as seen in patient's presentation of varying levels of cognitive performance, increased fall risk and decreased endurance  Pt would benefit from continued Physical Therapy treatment to address deficits as defined above and maximize level of functional mobility  As demonstrated by objective findings, the assigned level of complexity for this evaluation is low     Goals   Patient Goals Unable to state due to mentation   Plan   Treatment/Interventions Cognitive reorientation; Bed mobility;Spoke to nursing;Spoke to case management;OT   PT Frequency   (no skilled PT needs)   Recommendation   PT Discharge Recommendation Return to previous environment with no needs   Additional Comments Pt spoke to Pepe Martinez:  who spoke to [facility] re patient to obtain information about pt's prior level of function  Per Pepe Martinez, pt can return to [facility] when ready for d/c  Pt is actually on Hospice care at this facility but have limited staff at this time so sent to [facility]  Pt has not skilled PT needs at baseline and therefore is not appropriate for PT while in the hospital   No achievable goals  Barthel Index   Feeding 0   Bathing 0   Grooming Score 0   Dressing Score 0   Bladder Score 0   Bowels Score 0   Toilet Use Score 0   Transfers (Bed/Chair) Score 0   Mobility (Level Surface) Score 0   Stairs Score 0   Barthel Index Score 0   Licensure   [place] License Number  Abida Marks Devin PT  [id number]

## 2020-12-30 NOTE — CONSULTS
Consultation - Infectious Disease   Gregg Esquivel 77 y o  female MRN: [id number]  Unit/Bed#: [address] Encounter: [id number]      IMPRESSION & RECOMMENDATIONS:   Impression/Recommendations:  1  Sepsis, POA at [facility]  Leukopenia, fever, tachypnea  Likely secondary to gram-negative bacteremia  COVID-19 infection may also be playing a role  Fever curve has come down  Patient is hemodynamically stable     -antibiotic plan as below  -monitor temperatures and hemodynamics  -recheck CBC in a m   -follow-up repeat procalcitonin  -supportive care    2  Gram-negative bacteremia  Most likely urinary source in the setting of chronic indwelling Davis catheter  No history of MDR infections     -continue renally dosed IV cefepime  -follow up pending blood cultures to guide further recommendations    3  UTI  In the setting of neurogenic bladder with chronic indwelling Davis catheter  With prior history of pansensitive E coli UTIs  Seems to be doing well on IV cefepime     -antibiotic plan as above  -follow-up urine and blood culture results    4  COVID-19 infection  Positive PCR on 12/24 at nursing facility  May be contributing to #1  Chest x-ray is negative  Transient worsening hypoxia has improved  Patient is high risk for severe COVID-19 infection given advanced age and medical comorbidities  Inflammatory markers are elevated  -start IV Remdesivir with plan for 5 day course  Monitor LFTs  -continue remainder of COVID pathway, including steroid  -recheck inflammatory markers in a m   -monitor respiratory status    5  Acute on chronic hypoxic respiratory failure  On baseline 2 L nasal cannula  Likely in the setting of sepsis  Chest x-ray is negative  O2 requirements have improved  -COVID treatment, as above  -continue IV steroid  -monitor O2 requirements    6  Acute kidney injury, on CKD 3   Creatinine is coming down     -dose adjust antibiotic based on renal function  -recheck BMP in a m   -volume management per primary service    7  Baseline dementia  Resides in nursing facility  8  Penicillin allergy  Patient is tolerating IV cephalosporin without difficulty  Antibiotics:  Cefepime 2    Ferritin 1171 > 1165  CRP 55 > 38  Procalcitonin 0 08    I discussed above plan with Dr Kris Arreola from Internal Medicine Service  I personally reviewed prior hospitalization records  Thank you for this consultation  We will follow along with you  HISTORY OF PRESENT ILLNESS:  Reason for Consult:  Bacteremia, COVID-19    HPI: Tamika Willoughby is a 77 y o  female with CKD, COPD on 2 L O2 at baseline, CAD, dementia, neurogenic bladder with chronic indwelling Davis catheter, prior UTIs who presented to [facility] ER on 12/29 from nursing facility with fever of 102 2, lethargy, hypoxia and cough  Initial saturations were in the mid 80s but improved with supplemental O2 increased to 5 L  Patient had recently tested positive for COVID-19 on 12/24  She was transferred to [facility] due to bed availability  Chest x-ray was negative  Urinalysis was abnormal   Patient was started on IV cefepime  Now blood culture show Gram-negative rods  O2 requirements have since improved  Fevers have come down  No new focal complaints  REVIEW OF SYSTEMS:  A complete system-based review of systems is otherwise negative      PAST MEDICAL HISTORY:  Past Medical History:   Diagnosis Date    Atrial fibrillation ([address] )     Brain aneurysm     CAD (coronary artery disease)     Cardiac disease     had stents 12 years ago [facility]    CKD (chronic kidney disease) stage 3, GFR 30-59 ml/min ckd    COPD (chronic obstructive pulmonary disease) (HCC)     Dementia (HCC)     Diastolic CHF, chronic (HCC)     GERD (gastroesophageal reflux disease)     Hyperlipidemia     Hyperlipidemia     Hypertension     Hypothyroidism     Migraine     [facility]     Renal disorder     Seizures ([address] )     Stroke [facility])      Past Surgical History:   Procedure Laterality Date    APPENDECTOMY      ARTERIOGRAM N/A 2018    Procedure: ARTERIOGRAM WITH STENT PLACEMENT;  Surgeon: Maite Reddy MD;  [facility]: [facility];  Service: Vascular    BLADDER TUMOR EXCISION      BRAIN SURGERY      1998  clip right frontal lobe    CARDIAC SURGERY      COLONOSCOPY      CORONARY STENT PLACEMENT      5 stents    EYE SURGERY      IR AORTAGRAM WITH RUN-OFF  2018    MANDIBLE FRACTURE SURGERY      TONSILLECTOMY      TUBAL LIGATION      UTERINE FIBROID SURGERY         FAMILY HISTORY:  Non-contributory    SOCIAL HISTORY:  Social History     Substance and Sexual Activity   Alcohol Use Never    Alcohol/week: 0 0 standard drinks    Frequency: Never     Social History     Substance and Sexual Activity   Drug Use No     Social History     Tobacco Use   Smoking Status Former Smoker    Packs/day: 5 00    Years: 40 00    Pack years: 200 00    Quit date: 2007    Years since quittin 3   Smokeless Tobacco Never Used       ALLERGIES:  Allergies   Allergen Reactions    Dye [Iodinated Diagnostic Agents] Other (See Comments)     Affects kidneys    Spiriva Handihaler [Tiotropium Bromide Monohydrate] Swelling    Ramipril Facial Swelling    Penicillins      unsure       MEDICATIONS:  All current active medications have been reviewed      PHYSICAL EXAM:  Vitals:  Temp:  [96 5 °F (35 8 °C)-99 3 °F (37 4 °C)] 99 3 °F (37 4 °C)  HR:  [41-78] 78  Resp:  [12-22] 12  BP: (119-182)/(56-83) 182/83  SpO2:  [91 %-100 %] 91 %  Temp (24hrs), Av 9 °F (36 6 °C), Min:96 5 °F (35 8 °C), Max:99 3 °F (37 4 °C)  Current: Temperature: 99 3 °F (37 4 °C)     Physical Exam:    General:  Awake, alert  Eyes:  Conjunctive clear with no hemorrhages or effusions  Neck:  Supple, trachea is midline  Lungs:  Nonlabored respirations  Cardiac:  Regular rate and rhythm  Abdomen:  Soft, non-tender, non-distended  Chronic indwelling Davis catheter  Extremities:  Trace symmetric edema  Skin:  No rashes, no ulcers  Neurological:  Moves all four extremities spontaneously  Psych:  No acute psychosis    LABS, IMAGING, & OTHER STUDIES:  Lab Results:  I have personally reviewed pertinent labs  Results from last 7 days   Lab Units 12/30/20 0454 12/29/20 0441 12/28/20  2150   POTASSIUM mmol/L 3 7 3 8 3 9   CHLORIDE mmol/L 106 107 106   CO2 mmol/L 30 28 31   BUN mg/dL 25 29* 29*   CREATININE mg/dL 1 42* 1 67* 1 73*   EGFR ml/min/1 73sq m 36 30 29   CALCIUM mg/dL 8 7 8 7 9 2   AST U/L 35 36 37   ALT U/L 23 24 25   ALK PHOS U/L 78 78 84     Results from last 7 days   Lab Units 12/30/20 0454 12/29/20 0441 12/28/20  2150   WBC Thousand/uL 3 07* 2 75* 2 64*   HEMOGLOBIN g/dL 12 3 11 5 11 8   PLATELETS Thousands/uL 87* 87* 98*     Results from last 7 days   Lab Units 12/29/20  0215   BLOOD CULTURE  Received in Microbiology Lab  Culture in Progress  GRAM STAIN RESULT  Gram negative rods*       Imaging Studies:   I have personally reviewed pertinent imaging study reports and images in PACS  Chest X-ray shows no acute cardiopulmonary disease  EKG, Pathology, and Other Studies:   I have personally reviewed pertinent reports

## 2020-12-30 NOTE — PROGRESS NOTES
[facility] Internal Medicine Progress Note  Patient:  Gael Reyes 77 y o  female   MRN: [id number]  PCP: Carlos Mckenna DO  Unit/Bed#: [address] [place] [address] Encounter: [id number]  Date Of Visit: 12/30/20    Problem List:    Principal Problem:    COVID-19 virus infection  Active Problems:    Acute on chronic respiratory failure with hypoxia (HCC)    Gram-negative bacteremia    Sepsis ([facility] )    COPD (chronic obstructive pulmonary disease) (HCC)    Chronic diastolic heart failure (HCC)    Atrial fibrillation (HCC)    Elevated troponin    Chronic indwelling Davis catheter    Coronary artery disease involving native coronary artery of native heart without angina pectoris    Hypothyroidism    Hyperlipidemia    Ambulatory dysfunction    Essential hypertension      Assessment & Plan:    Sepsis ([facility] [address] )  Assessment & Plan  As evidenced by fever, leukopenia and tachypnea prior to admission  Likely secondary to COVID-19 infection and Gram-negative bacteremia  Now afebrile clinically improving  · Continue antibiotics as below  · Monitor clinically      Gram-negative bacteremia  Assessment & Plan  Patient was noted to have 1/2 blood culture positive for Gram-negative rods  Likely urinary source  · Follow-up UA, urine culture  · Continue IV cefepime  · Monitor clinically  · Consider abdominal imaging  · Infectious disease evaluation appreciated    Acute on chronic respiratory failure with hypoxia [facility])  Assessment & Plan  Patient is on chronic supplemental oxygen about 2 L  Noted to be hypoxic to mid 80s on 2 L of supplemental oxygen  Now improved, Currently saturating in low to mid 90s on room air at rest  · Continue treatment as above  · Incentive spirometry, activity as tolerated  · PT/OT    * COVID-19 virus infection  Assessment & Plan  Recently diagnosed COVID-19 on 12/24  Presented to ED with fever and hypoxia saturating in mid 80s in 2 L of chronic supplemental oxygen  Mild disease based on oxygen  3-5 L requirement on presentation, patient uses 2 L of supplemental oxygen at baseline  CXR - without any acute abnormality  SARS-CoV-2 PCR positive-at outside facility on 12/24, retested 12/29  Hypoxia has improved currently saturating adequately on room air  Patient does not demonstrate significant pulmonary symptoms at present but remains at high risk of severe COVID-19 infection due to age and comorbidities  · Medication treatment started per COVID protocol:  · Dexamethasone 6 mg IV daily for 10 days  · Famotidine daily  · Start Remdesivir given improvement in renal function  · Check and trend inflammatory markers, CRP, ferritin  · Significantly elevated but downtrending  · Check troponin, proBNP, D-dimer  · Troponin minimally elevated and flat  · ProBNP 1 989  · D-dimer mildly elevated, downtrending    · Daily CBC, CMP  · Procalcitonin negative x2  · Encourage  proing if tolerated  · Increase activity and mobilization as tolerated, limited at baseline      Results from last 7 days   Lab Units 12/29/20  0932   SARS-COV-2  Positive*     Results from last 7 days   Lab Units 12/30/20  0454 12/29/20  0215 12/28/20  2150   CRP mg/L 38 4* 55 1*  --    FERRITIN ng/mL 1,165* 1,171*  --    D-DIMER QUANTITATIVE ug/ml FEU 0 69*  --  1 02*      Results from last 7 days   Lab Units 12/29/20  0215   PROCALCITONIN ng/ml 0 08      Results from last 7 days   Lab Units 12/29/20  0443 12/29/20  0215 12/28/20  2150   TROPONIN I ng/mL 0 06* 0 06* 0 05*         Atrial fibrillation (HCC)  Assessment & Plan  Rate controlled  Continue metoprolol, digoxin, Eliquis  Follow-up digoxin level -0 4    Chronic diastolic heart failure (HCC)  Assessment & Plan  Wt Readings from Last 3 Encounters:   12/30/20 79 5 kg (175 lb 4 3 oz)   12/28/20 79 4 kg (175 lb 0 7 oz)   06/01/20 81 9 kg (180 lb 8 9 oz)       Prior echocardiogram with EF 45%  Currently appears euvolemic  Continue metoprolol  Will consider resuming Lasix in a m        COPD (chronic obstructive pulmonary disease) ([facility] )  Assessment & Plan  No evidence of exacerbation  Chest x-ray unremarkable  Continue bronchodilators, Breo  Supplemental oxygen  Monitor respiratory status    Hypothyroidism  Assessment & Plan  Resumed Synthroid  Follow-up thyroid profile as outpatient 4-6 weeks    Coronary artery disease involving native coronary artery of native heart without angina pectoris  Assessment & Plan  Continue aspirin, metoprolol, statin    Chronic indwelling Davis catheter  Assessment & Plan  Patient with chronic indwelling Davis catheter  Likely source of Gram-negative bacteremia  Follow-up UA, urine culture  Will consider catheter exchange    Elevated troponin  Assessment & Plan  Likely non MI troponin elevation in setting acute infection, hypoxia and renal insufficiency  Trend remains flat  Patient denies any chest pain  Continue medical management    Essential hypertension  Assessment & Plan  Stable  Continue metoprolol    Ambulatory dysfunction  Assessment & Plan  PT/OT    Hyperlipidemia  Assessment & Plan  Continue statin        VTE Pharmacologic Prophylaxis:   Pharmacologic: Apixaban (Eliquis)  Mechanical VTE Prophylaxis in Place: Yes    Patient Centered Rounds: I have performed bedside rounds with nursing staff today  Discussions with Specialists or Other Care Team Provider:  ID    Education and Discussions with Family / Patient:  Discussed with daughter over the phone    Time Spent for Care: 45 minutes  More than 50% of total time spent on counseling and coordination of care as described above      Current Length of Stay: 1 day(s)    Current Patient Status: Inpatient   Certification Statement: The patient will continue to require additional inpatient hospital stay due to Bacteremia, COVID pneumonia    Discharge Plan:  Back to facility when clinically improved    Code Status: Level 1 - Full Code, confirmed with daughter      Subjective:   Comfortably sitting in bed  Denies chest pain or shortness of breath  No cough was observed  Reports having intermittent headache improved after receiving Dilaudid          Objective:     Vitals:   Temp (24hrs), Av 2 °F (36 8 °C), Min:97 5 °F (36 4 °C), Max:99 3 °F (37 4 °C)    Temp:  [97 5 °F (36 4 °C)-99 3 °F (37 4 °C)] 97 5 °F (36 4 °C)  HR:  [56-78] 78  Resp:  [12-17] 17  BP: (148-182)/(59-83) 148/71  SpO2:  [91 %-97 %] 91 % on room air  Body mass index is 32 06 kg/m²  Input and Output Summary (last 24 hours): Intake/Output Summary (Last 24 hours) at 2020 1854  Last data filed at 2020 0601  Gross per 24 hour   Intake 50 ml   Output 775 ml   Net -725 ml       Physical Exam:     Physical Exam  Constitutional:       General: She is not in acute distress  Comments: Chronically ill   Neck:      Musculoskeletal: Neck supple  Cardiovascular:      Rate and Rhythm: Normal rate and regular rhythm  Pulmonary:      Effort: Pulmonary effort is normal  No respiratory distress  Breath sounds: Normal breath sounds  No wheezing or rales  Comments: Diminished but clear bilaterally  Abdominal:      General: Bowel sounds are normal  There is no distension  Palpations: Abdomen is soft  Tenderness: There is no abdominal tenderness  There is no guarding or rebound  Genitourinary:     Comments: Chronic Davis catheter in place  Musculoskeletal:      Comments: Trace lower extremity edema   Skin:     General: Skin is warm and dry  Findings: No rash  Neurological:      Mental Status: She is alert  Mental status is at baseline  Cranial Nerves: No cranial nerve deficit  Psychiatric:         Cognition and Memory: Cognition is impaired           Additional Data:     Labs:    Results from last 7 days   Lab Units 20   WBC Thousand/uL 3 07*   HEMOGLOBIN g/dL 12 3   HEMATOCRIT % 39 7   PLATELETS Thousands/uL 87*   NEUTROS PCT % 70   LYMPHS PCT % 20   MONOS PCT % 9   EOS PCT % 0     Results from last 7 days   Lab Units 20 POTASSIUM mmol/L 3 7   CHLORIDE mmol/L 106   CO2 mmol/L 30   BUN mg/dL 25   CREATININE mg/dL 1 42*   CALCIUM mg/dL 8 7   ALK PHOS U/L 78   ALT U/L 23   AST U/L 35     Results from last 7 days   Lab Units 12/30/20  0454   INR  1 23*       * I Have Reviewed All Lab Data Listed Above  * Additional Pertinent Lab Tests Reviewed: All Labs Within Last 24 Hours Reviewed      Imaging:  Imaging Reports Reviewed Today Include:  Chest x-ray    Recent Cultures (last 7 days):     Results from last 7 days   Lab Units 12/29/20  0215   BLOOD CULTURE  Gram Negative Lukasz Enteric Like*  No Growth at 24 hrs     GRAM STAIN RESULT  Gram negative rods*       Last 24 Hours Medication List:   Current Facility-Administered Medications   Medication Dose Route Frequency Provider Last Rate    acetaminophen  975 mg Oral Q6H PRN Williams Carlos MD      albuterol  2 puff Inhalation Q6H PRN Williams Carlos MD      apixaban  2 5 mg Oral BID Williams Carlos MD      ascorbic acid  1,000 mg Oral Q12H [address] Williams Carlos MD      aspirin  81 mg Oral Daily Williams Carlos MD      atorvastatin  20 mg Oral Daily With Lencho Kendall MD      bisacodyl  10 mg Rectal Daily PRN Williams Carlos MD      buPROPion  150 mg Oral Daily Williams Cralos MD      carBAMazepine  100 mg Oral TID Williams Carlos MD      cefepime  2,000 mg Intravenous Q24H Williams Carlos MD 2,000 mg (12/29/20 2311)    cholecalciferol  2,000 Units Oral Daily Williams Carlos MD      dexamethasone  6 mg Intravenous Q24H Williams Carlos MD      digoxin  125 mcg Oral Daily Williams Carlos MD      divalproex sodium  250 mg Oral Q12H Jose Angel Rosas MD      famotidine  10 mg Oral Daily Williams Carlos MD      fluticasone-vilanterol  1 puff Inhalation Daily Williams Carlos MD      haloperidol  1 mg Oral Daily PRN Williams Carlos MD      HYDROmorphone  2 mg Oral Q6H PRN Williams Carlos MD      hyoscyamine  0 125 mg Sublingual Q4H PRN Williams Carlos MD      levothyroxine  100 mcg Oral Early Morning Chema Mariee MD      LORazepam  0 5 mg Oral HS Chema Mariee MD      LORazepam  0 5 mg Oral Q6H PRN Chema Mariee MD      melatonin  6 mg Oral HS Chema Mariee MD      metoprolol tartrate  100 mg Oral Q12H [facility] Chema Mariee MD      zinc sulfate  220 mg Oral Daily Chema Mariee MD      Followed by   Gerard Cadena ON 1/5/2021] multivitamin-minerals  1 tablet Oral Daily Chema Mariee MD      nystatin   Topical BID Chema Mariee MD      ondansetron  4 mg Intravenous Q6H PRN Chema Mariee MD      polyethylene glycol  17 g Oral Daily Chema Mariee MD     Emaline Folds [START ON 12/31/2020] remdesivir  100 mg Intravenous Q24H Juanito Segura DO      senna  1 tablet Oral Daily Chema Mariee MD            Today, Patient Was Seen By: Chema Mariee MD    ** Please Note: "This note has been constructed using a voice recognition system  Therefore there may be syntax, spelling, and/or grammatical errors   Please call if you have any questions  "**

## 2020-12-30 NOTE — PLAN OF CARE
Problem: Potential for Falls  Goal: Patient will remain free of falls  Description: INTERVENTIONS:  - Assess patient frequently for physical needs  -  Identify cognitive and physical deficits and behaviors that affect risk of falls  -  [place] fall precautions as indicated by assessment   - Educate patient/family on patient safety including physical limitations  - Instruct patient to call for assistance with activity based on assessment  - Modify environment to reduce risk of injury  - Consider OT/PT consult to assist with strengthening/mobility  Outcome: Progressing     Problem: NEUROSENSORY - ADULT  Goal: Achieves stable or improved neurological status  Description: INTERVENTIONS  - Monitor and report changes in neurological status  - Monitor vital signs such as temperature, blood pressure, glucose, and any other labs ordered   - Initiate measures to prevent increased intracranial pressure  - Monitor for seizure activity and implement precautions if appropriate      Outcome: Progressing  Goal: Remains free of injury related to seizures activity  Description: INTERVENTIONS  - Maintain airway, patient safety  and administer oxygen as ordered  - Monitor patient for seizure activity, document and report duration and description of seizure to physician/advanced practitioner  - If seizure occurs,  ensure patient safety during seizure  - Reorient patient post seizure  - Seizure pads on all 4 side rails  - Instruct patient/family to notify RN of any seizure activity including if an aura is experienced  - Instruct patient/family to call for assistance with activity based on nursing assessment  - Administer anti-seizure medications if ordered    Outcome: Progressing  Goal: Achieves maximal functionality and self care  Description: INTERVENTIONS  - Monitor swallowing and airway patency with patient fatigue and changes in neurological status  - Encourage and assist patient to increase activity and self care     - Encourage visually impaired, hearing impaired and aphasic patients to use assistive/communication devices  Outcome: Progressing     Problem: RESPIRATORY - ADULT  Goal: Achieves optimal ventilation and oxygenation  Description: INTERVENTIONS:  - Assess for changes in respiratory status  - Assess for changes in mentation and behavior  - Position to facilitate oxygenation and minimize respiratory effort  - Oxygen administered by appropriate delivery if ordered  - Initiate smoking cessation education as indicated  - Encourage broncho-pulmonary hygiene including cough, deep breathe, Incentive Spirometry  - Assess the need for suctioning and aspirate as needed  - Assess and instruct to report SOB or any respiratory difficulty  - Respiratory Therapy support as indicated  Outcome: Progressing     Problem: METABOLIC, FLUID AND ELECTROLYTES - ADULT  Goal: Electrolytes maintained within normal limits  Description: INTERVENTIONS:  - Monitor labs and assess patient for signs and symptoms of electrolyte imbalances  - Administer electrolyte replacement as ordered  - Monitor response to electrolyte replacements, including repeat lab results as appropriate  - Instruct patient on fluid and nutrition as appropriate  Outcome: Progressing  Goal: Fluid balance maintained  Description: INTERVENTIONS:  - Monitor labs   - Monitor I/O and WT  - Instruct patient on fluid and nutrition as appropriate  - Assess for signs & symptoms of volume excess or deficit  Outcome: Progressing  Goal: Glucose maintained within target range  Description: INTERVENTIONS:  - Monitor Blood Glucose as ordered  - Assess for signs and symptoms of hyperglycemia and hypoglycemia  - Administer ordered medications to maintain glucose within target range  - Assess nutritional intake and initiate nutrition service referral as needed  Outcome: Progressing     Problem: SKIN/TISSUE INTEGRITY - ADULT  Goal: Skin integrity remains intact  Description: INTERVENTIONS  - Identify patients at risk for skin breakdown  - Assess and monitor skin integrity  - Assess and monitor nutrition and hydration status  - Monitor labs (i e  albumin)  - Assess for incontinence   - Turn and reposition patient  - Assist with mobility/ambulation  - Relieve pressure over bony prominences  - Avoid friction and shearing  - Provide appropriate hygiene as needed including keeping skin clean and dry  - Evaluate need for skin moisturizer/barrier cream  - Collaborate with interdisciplinary team (i e  Nutrition, Rehabilitation, etc )   - Patient/family teaching  Outcome: Progressing  Goal: Incision(s), wounds(s) or drain site(s) healing without S/S of infection  Description: INTERVENTIONS  - Assess and document risk factors for skin impairment   - Assess and document dressing, incision, wound bed, drain sites and surrounding tissue  - Consider nutrition services referral as needed  - Oral mucous membranes remain intact  - Provide patient/ family education  Outcome: Progressing  Goal: Oral mucous membranes remain intact  Description: INTERVENTIONS  - Assess oral mucosa and hygiene practices  - Implement preventative oral hygiene regimen  - Implement oral medicated treatments as ordered  - Initiate Nutrition services referral as needed  Outcome: Progressing     Problem: MUSCULOSKELETAL - ADULT  Goal: Maintain or return mobility to safest level of function  Description: INTERVENTIONS:  - Assess patient's ability to carry out ADLs; assess patient's baseline for ADL function and identify physical deficits which impact ability to perform ADLs (bathing, care of mouth/teeth, toileting, grooming, dressing, etc )  - Assess/evaluate cause of self-care deficits   - Assess range of motion  - Assess patient's mobility  - Assess patient's need for assistive devices and provide as appropriate  - Encourage maximum independence but intervene and supervise when necessary  - Involve family in performance of ADLs  - Assess for home care needs following discharge   - Consider OT consult to assist with ADL evaluation and planning for discharge  - Provide patient education as appropriate  Outcome: Progressing  Goal: Maintain proper alignment of affected body part  Description: INTERVENTIONS:  - Support, maintain and protect limb and body alignment  - Provide patient/ family with appropriate education  Outcome: Progressing

## 2020-12-30 NOTE — PLAN OF CARE
Problem: Potential for Falls  Goal: Patient will remain free of falls  Description: INTERVENTIONS:  - Assess patient frequently for physical needs  -  Identify cognitive and physical deficits and behaviors that affect risk of falls  -  [place] fall precautions as indicated by assessment   - Educate patient/family on patient safety including physical limitations  - Instruct patient to call for assistance with activity based on assessment  - Modify environment to reduce risk of injury  - Consider OT/PT consult to assist with strengthening/mobility  Outcome: Progressing - Patient has call bell within reach, the bed alarm is on and all other fall precautions are in place       Problem: NEUROSENSORY - ADULT  Goal: Achieves stable or improved neurological status  Description: INTERVENTIONS  - Monitor and report changes in neurological status  - Monitor vital signs such as temperature, blood pressure, glucose, and any other labs ordered   - Initiate measures to prevent increased intracranial pressure  - Monitor for seizure activity and implement precautions if appropriate      Outcome: Progressing  Goal: Remains free of injury related to seizures activity  Description: INTERVENTIONS  - Maintain airway, patient safety  and administer oxygen as ordered  - Monitor patient for seizure activity, document and report duration and description of seizure to physician/advanced practitioner  - If seizure occurs,  ensure patient safety during seizure  - Reorient patient post seizure  - Seizure pads on all 4 side rails  - Instruct patient/family to notify RN of any seizure activity including if an aura is experienced  - Instruct patient/family to call for assistance with activity based on nursing assessment  - Administer anti-seizure medications if ordered    Outcome: Progressing  Goal: Achieves maximal functionality and self care  Description: INTERVENTIONS  - Monitor swallowing and airway patency with patient fatigue and changes in neurological status  - Encourage and assist patient to increase activity and self care     - Encourage visually impaired, hearing impaired and aphasic patients to use assistive/communication devices  Outcome: Progressing     Problem: RESPIRATORY - ADULT  Goal: Achieves optimal ventilation and oxygenation  Description: INTERVENTIONS:  - Assess for changes in respiratory status  - Assess for changes in mentation and behavior  - Position to facilitate oxygenation and minimize respiratory effort  - Oxygen administered by appropriate delivery if ordered  - Initiate smoking cessation education as indicated  - Encourage broncho-pulmonary hygiene including cough, deep breathe, Incentive Spirometry  - Assess the need for suctioning and aspirate as needed  - Assess and instruct to report SOB or any respiratory difficulty  - Respiratory Therapy support as indicated  Outcome: Progressing     Problem: METABOLIC, FLUID AND ELECTROLYTES - ADULT  Goal: Electrolytes maintained within normal limits  Description: INTERVENTIONS:  - Monitor labs and assess patient for signs and symptoms of electrolyte imbalances  - Administer electrolyte replacement as ordered  - Monitor response to electrolyte replacements, including repeat lab results as appropriate  - Instruct patient on fluid and nutrition as appropriate  Outcome: Progressing  Goal: Fluid balance maintained  Description: INTERVENTIONS:  - Monitor labs   - Monitor I/O and WT  - Instruct patient on fluid and nutrition as appropriate  - Assess for signs & symptoms of volume excess or deficit  Outcome: Progressing     Problem: SKIN/TISSUE INTEGRITY - ADULT  Goal: Skin integrity remains intact  Description: INTERVENTIONS  - Identify patients at risk for skin breakdown  - Assess and monitor skin integrity  - Assess and monitor nutrition and hydration status  - Monitor labs (i e  albumin)  - Assess for incontinence   - Turn and reposition patient  - Assist with mobility/ambulation  - Relieve pressure over bony prominences  - Avoid friction and shearing  - Provide appropriate hygiene as needed including keeping skin clean and dry  - Evaluate need for skin moisturizer/barrier cream  - Collaborate with interdisciplinary team (i e  Nutrition, Rehabilitation, etc )   - Patient/family teaching  Outcome: Progressing  Goal: Incision(s), wounds(s) or drain site(s) healing without S/S of infection  Description: INTERVENTIONS  - Assess and document risk factors for skin impairment   - Assess and document dressing, incision, wound bed, drain sites and surrounding tissue  - Consider nutrition services referral as needed  - Oral mucous membranes remain intact  - Provide patient/ family education  Outcome: Progressing     Problem: MUSCULOSKELETAL - ADULT  Goal: Maintain or return mobility to safest level of function  Description: INTERVENTIONS:  - Assess patient's ability to carry out ADLs; assess patient's baseline for ADL function and identify physical deficits which impact ability to perform ADLs (bathing, care of mouth/teeth, toileting, grooming, dressing, etc )  - Assess/evaluate cause of self-care deficits   - Assess range of motion  - Assess patient's mobility  - Assess patient's need for assistive devices and provide as appropriate  - Encourage maximum independence but intervene and supervise when necessary  - Involve family in performance of ADLs  - Assess for home care needs following discharge   - Consider OT consult to assist with ADL evaluation and planning for discharge  - Provide patient education as appropriate  Outcome: Progressing     Problem: Prexisting or High Potential for Compromised Skin Integrity  Goal: Skin integrity is maintained or improved  Description: INTERVENTIONS:  - Identify patients at risk for skin breakdown  - Assess and monitor skin integrity  - Assess and monitor nutrition and hydration status  - Monitor labs   - Assess for incontinence   - Turn and reposition patient  - Assist with mobility/ambulation  - Relieve pressure over bony prominences  - Avoid friction and shearing  - Provide appropriate hygiene as needed including keeping skin clean and dry  - Evaluate need for skin moisturizer/barrier cream  - Collaborate with interdisciplinary team   - Patient/family teaching  - Consider wound care consult   Outcome: Progressing

## 2020-12-30 NOTE — ASSESSMENT & PLAN NOTE
No evidence of exacerbation  Chest x-ray unremarkable  Continue bronchodilators, Breo  Supplemental oxygen  Monitor respiratory status

## 2020-12-31 LAB
ALBUMIN SERPL BCP-MCNC: 2.8 G/DL (ref 3.5–5)
ALP SERPL-CCNC: 99 U/L (ref 46–116)
ALT SERPL W P-5'-P-CCNC: 39 U/L (ref 12–78)
ANION GAP SERPL CALCULATED.3IONS-SCNC: 8 MMOL/L (ref 4–13)
AST SERPL W P-5'-P-CCNC: 57 U/L (ref 5–45)
BACTERIA BLD CULT: ABNORMAL
BASOPHILS # BLD AUTO: 0.02 THOUSANDS/ΜL (ref 0–0.1)
BASOPHILS NFR BLD AUTO: 1 % (ref 0–1)
BILIRUB SERPL-MCNC: 0.4 MG/DL (ref 0.2–1)
BUN SERPL-MCNC: 27 MG/DL (ref 5–25)
CALCIUM ALBUM COR SERPL-MCNC: 10 MG/DL (ref 8.3–10.1)
CALCIUM SERPL-MCNC: 9 MG/DL (ref 8.3–10.1)
CHLORIDE SERPL-SCNC: 107 MMOL/L (ref 100–108)
CO2 SERPL-SCNC: 29 MMOL/L (ref 21–32)
CREAT SERPL-MCNC: 1.47 MG/DL (ref 0.6–1.3)
CRP SERPL QL: 81.4 MG/L
D DIMER PPP FEU-MCNC: 0.69 UG/ML FEU
EOSINOPHIL # BLD AUTO: 0 THOUSAND/ΜL (ref 0–0.61)
EOSINOPHIL NFR BLD AUTO: 0 % (ref 0–6)
ERYTHROCYTE [DISTWIDTH] IN BLOOD BY AUTOMATED COUNT: 13.3 % (ref 11.6–15.1)
FERRITIN SERPL-MCNC: 1499 NG/ML (ref 8–388)
GFR SERPL CREATININE-BSD FRML MDRD: 35 ML/MIN/1.73SQ M
GLUCOSE SERPL-MCNC: 129 MG/DL (ref 65–140)
GRAM STN SPEC: ABNORMAL
HCT VFR BLD AUTO: 40.9 % (ref 34.8–46.1)
HGB BLD-MCNC: 12.5 G/DL (ref 11.5–15.4)
IMM GRANULOCYTES # BLD AUTO: 0.07 THOUSAND/UL (ref 0–0.2)
IMM GRANULOCYTES NFR BLD AUTO: 2 % (ref 0–2)
LYMPHOCYTES # BLD AUTO: 1.11 THOUSANDS/ΜL (ref 0.6–4.47)
LYMPHOCYTES NFR BLD AUTO: 36 % (ref 14–44)
MCH RBC QN AUTO: 32.1 PG (ref 26.8–34.3)
MCHC RBC AUTO-ENTMCNC: 30.6 G/DL (ref 31.4–37.4)
MCV RBC AUTO: 105 FL (ref 82–98)
MONOCYTES # BLD AUTO: 0.51 THOUSAND/ΜL (ref 0.17–1.22)
MONOCYTES NFR BLD AUTO: 17 % (ref 4–12)
NEUTROPHILS # BLD AUTO: 1.34 THOUSANDS/ΜL (ref 1.85–7.62)
NEUTS SEG NFR BLD AUTO: 44 % (ref 43–75)
NRBC BLD AUTO-RTO: 0 /100 WBCS
PLATELET # BLD AUTO: 96 THOUSANDS/UL (ref 149–390)
PMV BLD AUTO: 11.7 FL (ref 8.9–12.7)
POTASSIUM SERPL-SCNC: 3.9 MMOL/L (ref 3.5–5.3)
PROT SERPL-MCNC: 7.3 G/DL (ref 6.4–8.2)
RBC # BLD AUTO: 3.9 MILLION/UL (ref 3.81–5.12)
SODIUM SERPL-SCNC: 144 MMOL/L (ref 136–145)
WBC # BLD AUTO: 3.05 THOUSAND/UL (ref 4.31–10.16)

## 2020-12-31 PROCEDURE — 92526 ORAL FUNCTION THERAPY: CPT

## 2020-12-31 PROCEDURE — 86140 C-REACTIVE PROTEIN: CPT | Performed by: INTERNAL MEDICINE

## 2020-12-31 PROCEDURE — 99232 SBSQ HOSP IP/OBS MODERATE 35: CPT | Performed by: NURSE PRACTITIONER

## 2020-12-31 PROCEDURE — 80053 COMPREHEN METABOLIC PANEL: CPT | Performed by: INTERNAL MEDICINE

## 2020-12-31 PROCEDURE — 99233 SBSQ HOSP IP/OBS HIGH 50: CPT | Performed by: INTERNAL MEDICINE

## 2020-12-31 PROCEDURE — 85379 FIBRIN DEGRADATION QUANT: CPT | Performed by: INTERNAL MEDICINE

## 2020-12-31 PROCEDURE — 85025 COMPLETE CBC W/AUTO DIFF WBC: CPT | Performed by: INTERNAL MEDICINE

## 2020-12-31 PROCEDURE — 82728 ASSAY OF FERRITIN: CPT | Performed by: INTERNAL MEDICINE

## 2020-12-31 RX ADMIN — ATORVASTATIN CALCIUM 20 MG: 20 TABLET, FILM COATED ORAL at 16:00

## 2020-12-31 RX ADMIN — DIGOXIN 125 MCG: 125 TABLET ORAL at 10:07

## 2020-12-31 RX ADMIN — APIXABAN 2.5 MG: 2.5 TABLET, FILM COATED ORAL at 10:08

## 2020-12-31 RX ADMIN — FAMOTIDINE 10 MG: 20 TABLET, FILM COATED ORAL at 10:07

## 2020-12-31 RX ADMIN — ASPIRIN 81 MG CHEWABLE TABLET 81 MG: 81 TABLET CHEWABLE at 10:09

## 2020-12-31 RX ADMIN — CARBAMAZEPINE 100 MG: 100 TABLET, CHEWABLE ORAL at 21:34

## 2020-12-31 RX ADMIN — FUROSEMIDE 40 MG: 40 TABLET ORAL at 10:09

## 2020-12-31 RX ADMIN — DIVALPROEX SODIUM 250 MG: 250 TABLET, DELAYED RELEASE ORAL at 21:58

## 2020-12-31 RX ADMIN — Medication 2000 UNITS: at 10:06

## 2020-12-31 RX ADMIN — METOPROLOL TARTRATE 100 MG: 100 TABLET, FILM COATED ORAL at 21:42

## 2020-12-31 RX ADMIN — METOPROLOL TARTRATE 100 MG: 100 TABLET, FILM COATED ORAL at 10:09

## 2020-12-31 RX ADMIN — MELATONIN TAB 3 MG 6 MG: 3 TAB at 21:34

## 2020-12-31 RX ADMIN — HYDROMORPHONE HYDROCHLORIDE 2 MG: 2 TABLET ORAL at 10:07

## 2020-12-31 RX ADMIN — BUPROPION 150 MG: 150 TABLET, EXTENDED RELEASE ORAL at 10:06

## 2020-12-31 RX ADMIN — NYSTATIN: 100000 POWDER TOPICAL at 11:02

## 2020-12-31 RX ADMIN — CARBAMAZEPINE 100 MG: 100 TABLET, CHEWABLE ORAL at 10:08

## 2020-12-31 RX ADMIN — OXYCODONE HYDROCHLORIDE AND ACETAMINOPHEN 1000 MG: 500 TABLET ORAL at 10:08

## 2020-12-31 RX ADMIN — REMDESIVIR 100 MG: 100 INJECTION, POWDER, LYOPHILIZED, FOR SOLUTION INTRAVENOUS at 12:25

## 2020-12-31 RX ADMIN — DIVALPROEX SODIUM 250 MG: 250 TABLET, DELAYED RELEASE ORAL at 10:12

## 2020-12-31 RX ADMIN — ZINC SULFATE 220 MG (50 MG) CAPSULE 220 MG: CAPSULE at 10:08

## 2020-12-31 RX ADMIN — FLUTICASONE FUROATE AND VILANTEROL TRIFENATATE 1 PUFF: 200; 25 POWDER RESPIRATORY (INHALATION) at 11:02

## 2020-12-31 RX ADMIN — LEVOTHYROXINE SODIUM 100 MCG: 100 TABLET ORAL at 05:55

## 2020-12-31 RX ADMIN — CEFEPIME HYDROCHLORIDE 2000 MG: 2 INJECTION, POWDER, FOR SOLUTION INTRAVENOUS at 21:34

## 2020-12-31 RX ADMIN — LORAZEPAM 0.5 MG: 0.5 TABLET ORAL at 21:34

## 2020-12-31 RX ADMIN — SENNOSIDES 8.6 MG: 8.6 TABLET ORAL at 10:10

## 2020-12-31 RX ADMIN — CARBAMAZEPINE 100 MG: 100 TABLET, CHEWABLE ORAL at 16:00

## 2020-12-31 RX ADMIN — NYSTATIN 1 APPLICATION: 100000 POWDER TOPICAL at 18:57

## 2020-12-31 RX ADMIN — DEXAMETHASONE SODIUM PHOSPHATE 6 MG: 4 INJECTION, SOLUTION INTRAMUSCULAR; INTRAVENOUS at 02:45

## 2020-12-31 RX ADMIN — OXYCODONE HYDROCHLORIDE AND ACETAMINOPHEN 1000 MG: 500 TABLET ORAL at 21:34

## 2020-12-31 RX ADMIN — POLYETHYLENE GLYCOL 3350 17 G: 17 POWDER, FOR SOLUTION ORAL at 10:09

## 2020-12-31 RX ADMIN — APIXABAN 2.5 MG: 2.5 TABLET, FILM COATED ORAL at 17:13

## 2020-12-31 RX ADMIN — HALOPERIDOL 1 MG: 1 TABLET ORAL at 01:03

## 2020-12-31 NOTE — PROGRESS NOTES
Progress Note - Infectious Disease   Jo Ansari 77 y o  female MRN: [id number]  Unit/Bed#: 6655 [facility] 382-02 Encounter: [id number]      IMPRESSION & RECOMMENDATIONS:   Impression/Recommendations:  1  Sepsis, POA at [facility]  Leukopenia, fever, tachypnea  Likely secondary to gram-negative bacteremia  COVID-19 infection may also be playing a role  Fever curve improving  Patient is hemodynamically stable  Procalcitonin level remains low      -antibiotic plan as below  -monitor temperatures and hemodynamics  -recheck CBC in a m   -supportive care     2  Citrobacter bacteremia  Most likely urinary source in the setting of chronic indwelling Davis catheter  No history of MDR infections  Also consider other intra-abdominal pathology      -continue renally dosed IV cefepime  -if urine culture is inconsistent with blood cultures, will need CT abdomen/pelvis      3  UTI  In the setting of neurogenic bladder with chronic indwelling Davis catheter  With prior history of pansensitive E coli UTIs  Seems to be doing well on IV cefepime  Urine culture shows strep and GNR     -antibiotic plan as above  -follow-up final urine culture results     4  COVID-19 infection  Positive PCR on 12/24 at nursing facility  May be contributing to #1  Chest x-ray is negative  Patient is high risk for severe COVID-19 infection given advanced age and medical comorbidities  Inflammatory markers remain elevated  O2 sats are stable stable between 3-5 L       -continue IV Remdesivir with plan for 5 day course  Monitor LFTs  -continue remainder of COVID pathway, including steroid   -monitor inflammatory markers  -monitor respiratory status     5  Acute on chronic hypoxic respiratory failure  On baseline 2 L nasal cannula  Likely in the setting of sepsis  Chest x-ray is negative  Currently requiring between 3-5 L supplemental O2      -COVID treatment, as above  -continue IV steroid  -monitor O2 requirements     6   Acute kidney injury, on CKD 3  Creatinine has trended down and remains stable      -dose adjust antibiotic based on renal function  -recheck BMP in a m   -volume management per primary service     7  Baseline dementia  Resides in nursing facility      8  Penicillin allergy  Patient is tolerating IV cephalosporin without difficulty      Antibiotics:  Cefepime 3  Remdesivir 2     Ferritin 1171 > 1165 > 1499  CRP 55 > 38 > 81  Procalcitonin 0 08     I discussed above plan with Dr Esperanza Noel from Internal Medicine Service  Subjective:  T-max 99 8°  Patient is intermittently requiring between 3-5 L supplemental oxygen  No new reported events  Objective:  Vitals:  Temp:  [97 5 °F (36 4 °C)-99 8 °F (37 7 °C)] 99 °F (37 2 °C)  HR:  [63-95] 63  Resp:  [16-17] 16  BP: (121-180)/(57-90) 121/57  SpO2:  [91 %-94 %] 94 %  Temp (24hrs), Av 9 °F (37 2 °C), Min:97 5 °F (36 4 °C), Max:99 8 °F (37 7 °C)  Current: Temperature: 99 °F (37 2 °C)    Physical Exam:   I was present on unit floor  Physical exam findings were performed by and discussed with Nursing  General:  No acute distress  HEENT:  Atraumatic normocephalic  Pulmonary:  Normal respiratory excursion without accessory muscle use  Abdomen:  Soft, nontender  Extremities:  No edema  Skin:  No rashes  Neuro: Moves all extremities spontaneously    Lab Results:  I have personally reviewed pertinent labs    Results from last 7 days   Lab Units 20  0441   POTASSIUM mmol/L 3 9 3 7 3 8   CHLORIDE mmol/L 107 106 107   CO2 mmol/L 29 30 28   BUN mg/dL 27* 25 29*   CREATININE mg/dL 1 47* 1 42* 1 67*   EGFR ml/min/1 73sq m 35 36 30   CALCIUM mg/dL 9 0 8 7 8 7   AST U/L 57* 35 36   ALT U/L 39 23 24   ALK PHOS U/L 99 78 78     Results from last 7 days   Lab Units 204 20  0441   WBC Thousand/uL 3 05* 3 07* 2 75*   HEMOGLOBIN g/dL 12 5 12 3 11 5   PLATELETS Thousands/uL 96* 87* 87*     Results from last 7 days Lab Units 12/30/20  0457 12/29/20  0440 12/29/20  0215   BLOOD CULTURE   --   --  No Growth at 48 hrs  Citrobacter freundii*   GRAM STAIN RESULT   --   --  Gram negative rods*   URINE CULTURE  >100,000 cfu/ml Gamma Hemolytic Streptococcus  20,000-29,000 cfu/ml Gram Negative Lukasz Enteric Like*  --   --    MRSA CULTURE ONLY   --  No Methicillin Resistant Staphlyococcus aureus (MRSA) isolated  --        Imaging Studies:   I have personally reviewed pertinent imaging study reports and images in PACS  EKG, Pathology, and Other Studies:   I have personally reviewed pertinent reports

## 2020-12-31 NOTE — CASE MANAGEMENT
LOS Day 1, Pt is not a MB, Pt is an Unplanned Readmission  CM contacted [address] () at [facility], introduced self/explained CM role  Samra Bonilla provided the following information about pt:  Pt has been at their facility for many years, she requires complete care for everything, she is nonambulatory/wheelchair-bound, she does not follow commands without someone initiating the action first, she has dementia and is confused at baseline, she wears oxygen 2 L NC AAT, she has a chronic Davis which she thinks possibly gets changed biweekly, pt was on hospice at their facility, she sent pt to the hospital because their hospice staffing was limited, she was not comfortable with pt staying in their facility considering the symptoms she was having, pt can return to their facility on hospice at discharge  CM will keep Samra Bonilla updated as needed and continue to follow pt for progression of care and coordination of discharge

## 2020-12-31 NOTE — ASSESSMENT & PLAN NOTE
As evidenced by fever, leukopenia and tachypnea prior to admission  Likely secondary to COVID-19 infection and Gram-negative bacteremia  Now afebrile clinically improving  · Continue antibiotics as below  · Monitor clinically

## 2020-12-31 NOTE — QUICK NOTE
QUICK NOTE - Deterioration Index  Vale Carroll 77 y o  female MRN: [id number]  Unit/Bed#: [address] 382-02 Encounter: [id number]      Time Paged: [address] [place]  Room #: 123  Primary RN: Jeffery Angeles   Arrival Time: 0030  Deterioration index score at time of page: 60    PROBLEMS:   Factors Contributing to Score  26% Leah coma scale is 12   20% Age is 77   18% Supplemental oxygen is Nasal cannula   15% Neurological exam is Vigilant (Hyperalert), Yes   9% Systolic is 159   4% Pulse oximetry is 91 %   3% Sodium is 143 mmol/L   1% Respiratory rate is 17   1% Platelet count is 87 Thousands/uL   1% WBC count is 3 07 Thousand/uL   1% Potassium is 3 7 mmol/L   1% Pulse is 88   1% Temperature is 99 3 °F (37 4 °C)   <1% Hematocrit is 39 7 %            PLAN:     Patient with baseline dementia/cognitive impairment, no change in mental status from baseline   O2 sat 90-92% on 2L NC, titrate as needed to maintain O2 sat > 88% (patient with chronic respiratory failure, O2 dependent at home)    Continue current plan of care per primary team   Call critical care with questions/concerns ext #[phone]    HPI Statement (Background): Per Esther Navas MD 12/29/2020 1847:  "77 y o  female with multiple comorbidities including CAD status post stent, diastolic CHF, COPD on 2 L of supplemental oxygen,  AFib, hypertension, dyslipidemia, hypothyroidism CKD, history of brain aneurysm, seizure disorder, dementia, GERD, peripheral artery disease, chronic indwelling catheter who initially presented to ED at [facility] with fever of 102 2° F, increasing lethargy and hypoxia  As per the records patient was tested positive for COVID on 12/24  In ED, patient was noted to be saturating in mid 80s at her baseline 2 L of supplemental oxygen  Patient was subsequently admitted but transferred to [address] [place] [address] due to bed availability    Patient was seen and examined, currently lying comfortably in bed knows that she is in the hospital but not able to provide details of the presentation  She denies any chest pain shortness of breath or cough reports having intermittent headache  After the transfer patient was noted to be afebrile, hemodynamically stable saturating in high 90s on 5 L of supplemental oxygen  1/2 blood culture noted to be positive for Gram-negative rods from admission blood cultures  "       Historical Information   Past Medical History:   Diagnosis Date    Atrial fibrillation ([address] )     Brain aneurysm     CAD (coronary artery disease)     Cardiac disease     had stents 12 years ago in [facility]    CKD (chronic kidney disease) stage 3, GFR 30-59 ml/min ckd    COPD (chronic obstructive pulmonary disease) (HCC)     Dementia (HCC)     Diastolic CHF, chronic (HCC)     GERD (gastroesophageal reflux disease)     Hyperlipidemia     Hyperlipidemia     Hypertension     Hypothyroidism     Migraine     [facility]per[facility]) [facility])     Renal disorder     Seizures ([facility])     Stroke [facility])      Past Surgical History:   Procedure Laterality Date    APPENDECTOMY      ARTERIOGRAM N/A 2018    Procedure: ARTERIOGRAM WITH STENT PLACEMENT;  Surgeon: Sunil Reddy MD;  [facility]: [facility];  Service: Vascular    BLADDER TUMOR EXCISION      BRAIN SURGERY        clip right frontal lobe    CARDIAC SURGERY      COLONOSCOPY      CORONARY STENT PLACEMENT      5 stents    EYE SURGERY      IR AORTAGRAM WITH RUN-OFF  2018    MANDIBLE FRACTURE SURGERY      TONSILLECTOMY      TUBAL LIGATION      UTERINE FIBROID SURGERY         Vitals:   Vitals:    20 1226 20 1624 20 1924 20 2126   BP: 148/71  (!) 180/90 (!) 178/82   BP Location:       Pulse:   95 88   Resp:  17 17    Temp:  97 5 °F (36 4 °C) 99 3 °F (37 4 °C)    TempSrc:       SpO2:   91%    Weight:       Height:           Temperature: Temp (24hrs), Av 7 °F (37 1 °C), Min:97 5 °F (36 4 °C), Max:99 3 °F (37 4 °C)  Current: Temperature: 99 3 °F (37 4 °C)    DIAGNOSTIC DATA:    Labs:   Results from last 7 days   Lab Units 12/30/20  0454 12/29/20  0441 12/28/20  2150   WBC Thousand/uL 3 07* 2 75* 2 64*   HEMOGLOBIN g/dL 12 3 11 5 11 8   HEMATOCRIT % 39 7 36 8 36 9   PLATELETS Thousands/uL 87* 87* 98*   NEUTROS PCT % 70 33* 41*   MONOS PCT % 9 13* 16*     Results from last 7 days   Lab Units 12/30/20  0454 12/29/20  0441 12/28/20  2150   SODIUM mmol/L 143 146* 147*   POTASSIUM mmol/L 3 7 3 8 3 9   CHLORIDE mmol/L 106 107 106   CO2 mmol/L 30 28 31   BUN mg/dL 25 29* 29*   CREATININE mg/dL 1 42* 1 67* 1 73*   CALCIUM mg/dL 8 7 8 7 9 2   ALK PHOS U/L 78 78 84   ALT U/L 23 24 25   AST U/L 35 36 37     Results from last 7 days   Lab Units 12/29/20  0441   MAGNESIUM mg/dL 2 0          Results from last 7 days   Lab Units 12/30/20  0454   INR  1 23*         Results from last 7 days   Lab Units 12/29/20  0443 12/29/20  0215 12/28/20  2150   TROPONIN I ng/mL 0 06* 0 06* 0 05*         Results from last 7 days   Lab Units 12/30/20  0454 12/29/20  0215   PROCALCITONIN ng/ml 0 10 0 08     No results found for: [facility]       Results from last 7 days   Lab Units 12/29/20  0441   TSH 3RD GENERATON uIU/mL 16 992*   FREE T4 ng/dL 0 70*       Code Status: Level 1 - Full Code

## 2020-12-31 NOTE — SPEECH THERAPY NOTE
Speech Language/Pathology    Speech/Language Pathology Progress Note    Patient Name: Tamika Willoughby  FGITU'M Date: 12/31/2020     Problem List  Principal Problem:    [id number] virus infection  Active Problems:    Elevated troponin    COPD (chronic obstructive pulmonary disease) (HCC)    Chronic diastolic heart failure (HCC)    Hyperlipidemia    Ambulatory dysfunction    Atrial fibrillation (HCC)    Chronic indwelling Davis catheter    Acute on chronic respiratory failure with hypoxia ([facility] )    Essential hypertension    Coronary artery disease involving native coronary artery of native heart without angina pectoris    Hypothyroidism    Gram-negative bacteremia    Sepsis ([facility] )      Subjective:  Pt  Was seen in her room for lunch meal  She was sleeping and had on bilateral mittens  Woke to her name and smiled  Objective:  Set up lunch tray in front of patient which consisted of  Cut up chicken, mashed potatoes, diced, carrots and pudding  She also had an apple juice  Handed patient her water cup and she kept attempting to bite the cup lid and not use the straw  Pt  Made no effort to initiate feeding herself  She also made no effort to hold her utensil even when cueing with [facility] assistance  She kept falling asleep and needed cues to "eat"  Mastication was extended but adequate for chicken  After 3rd sip of apple juice she said "I don't like this"  No overt s/s of aspiration was noted on any consistency  Pt  Was a full feed for entire meal     Assessment:  No s/s of aspiration on any consistency  Mastication requires extended time due to lethargy  She is a moderate choking risk if she falls asleep with food in her mouth  She makes no initiation to feed herself so will need continued assistance at meal times      Plan/Recommendations:  Continue with current diet Dys 3 and thin liquids, this is most likely most appropriate given that patient does not have lower dentition and has inconsistently levels of alertness with meal     Medications as tolerated  Speech to discharge at this time  Reconsult if warranted      Dorothy Schirmer, MS CCC-SLP  Speech Language Pathologist  Available via [address] [facility] License # [id number]  [facility] [address] # [id number]- [id number]

## 2020-12-31 NOTE — PLAN OF CARE
Problem: Potential for Falls  Goal: Patient will remain free of falls  Description: INTERVENTIONS:  - Assess patient frequently for physical needs  -  Identify cognitive and physical deficits and behaviors that affect risk of falls    -  [place] fall precautions as indicated by assessment   - Educate patient/family on patient safety including physical limitations  - Instruct patient to call for assistance with activity based on assessment  - Modify environment to reduce risk of injury  - Consider OT/PT consult to assist with strengthening/mobility  Outcome: Progressing

## 2021-01-01 LAB
ALBUMIN SERPL BCP-MCNC: 2.4 G/DL (ref 3.5–5)
ALP SERPL-CCNC: 96 U/L (ref 46–116)
ALT SERPL W P-5'-P-CCNC: 32 U/L (ref 12–78)
ANION GAP SERPL CALCULATED.3IONS-SCNC: 10 MMOL/L (ref 4–13)
AST SERPL W P-5'-P-CCNC: 50 U/L (ref 5–45)
BACTERIA UR QL AUTO: ABNORMAL /HPF
BASOPHILS # BLD AUTO: 0.03 THOUSANDS/ΜL (ref 0–0.1)
BASOPHILS NFR BLD AUTO: 1 % (ref 0–1)
BILIRUB SERPL-MCNC: 0.2 MG/DL (ref 0.2–1)
BILIRUB UR QL STRIP: NEGATIVE
BUN SERPL-MCNC: 54 MG/DL (ref 5–25)
CALCIUM ALBUM COR SERPL-MCNC: 10.4 MG/DL (ref 8.3–10.1)
CALCIUM SERPL-MCNC: 9.1 MG/DL (ref 8.3–10.1)
CHLORIDE SERPL-SCNC: 109 MMOL/L (ref 100–108)
CLARITY UR: ABNORMAL
CO2 SERPL-SCNC: 26 MMOL/L (ref 21–32)
COLOR UR: YELLOW
CREAT SERPL-MCNC: 1.72 MG/DL (ref 0.6–1.3)
CRP SERPL QL: 82 MG/L
D DIMER PPP FEU-MCNC: 0.64 UG/ML FEU
EOSINOPHIL # BLD AUTO: 0 THOUSAND/ΜL (ref 0–0.61)
EOSINOPHIL NFR BLD AUTO: 0 % (ref 0–6)
ERYTHROCYTE [DISTWIDTH] IN BLOOD BY AUTOMATED COUNT: 13.7 % (ref 11.6–15.1)
FERRITIN SERPL-MCNC: 1666 NG/ML (ref 8–388)
GFR SERPL CREATININE-BSD FRML MDRD: 29 ML/MIN/1.73SQ M
GLUCOSE SERPL-MCNC: 169 MG/DL (ref 65–140)
GLUCOSE UR STRIP-MCNC: NEGATIVE MG/DL
HCT VFR BLD AUTO: 42.6 % (ref 34.8–46.1)
HGB BLD-MCNC: 12.2 G/DL (ref 11.5–15.4)
HGB UR QL STRIP.AUTO: ABNORMAL
IMM GRANULOCYTES # BLD AUTO: 0.08 THOUSAND/UL (ref 0–0.2)
IMM GRANULOCYTES NFR BLD AUTO: 2 % (ref 0–2)
KETONES UR STRIP-MCNC: NEGATIVE MG/DL
LEUKOCYTE ESTERASE UR QL STRIP: ABNORMAL
LYMPHOCYTES # BLD AUTO: 1.2 THOUSANDS/ΜL (ref 0.6–4.47)
LYMPHOCYTES NFR BLD AUTO: 32 % (ref 14–44)
MCH RBC QN AUTO: 31.9 PG (ref 26.8–34.3)
MCHC RBC AUTO-ENTMCNC: 28.6 G/DL (ref 31.4–37.4)
MCV RBC AUTO: 108 FL (ref 82–98)
MONOCYTES # BLD AUTO: 0.34 THOUSAND/ΜL (ref 0.17–1.22)
MONOCYTES NFR BLD AUTO: 9 % (ref 4–12)
NEUTROPHILS # BLD AUTO: 2.12 THOUSANDS/ΜL (ref 1.85–7.62)
NEUTS SEG NFR BLD AUTO: 56 % (ref 43–75)
NITRITE UR QL STRIP: NEGATIVE
NON-SQ EPI CELLS URNS QL MICRO: ABNORMAL /HPF
NRBC BLD AUTO-RTO: 0 /100 WBCS
PH UR STRIP.AUTO: 6 [PH]
PLATELET # BLD AUTO: 108 THOUSANDS/UL (ref 149–390)
PMV BLD AUTO: 12.1 FL (ref 8.9–12.7)
POTASSIUM SERPL-SCNC: 4.4 MMOL/L (ref 3.5–5.3)
PROT SERPL-MCNC: 6.9 G/DL (ref 6.4–8.2)
PROT UR STRIP-MCNC: ABNORMAL MG/DL
RBC # BLD AUTO: 3.82 MILLION/UL (ref 3.81–5.12)
RBC #/AREA URNS AUTO: ABNORMAL /HPF
SODIUM SERPL-SCNC: 145 MMOL/L (ref 136–145)
SP GR UR STRIP.AUTO: 1.02 (ref 1–1.03)
UROBILINOGEN UR QL STRIP.AUTO: 1 E.U./DL
WBC # BLD AUTO: 3.77 THOUSAND/UL (ref 4.31–10.16)
WBC #/AREA URNS AUTO: ABNORMAL /HPF

## 2021-01-01 PROCEDURE — 80053 COMPREHEN METABOLIC PANEL: CPT | Performed by: INTERNAL MEDICINE

## 2021-01-01 PROCEDURE — 82728 ASSAY OF FERRITIN: CPT | Performed by: INTERNAL MEDICINE

## 2021-01-01 PROCEDURE — 85379 FIBRIN DEGRADATION QUANT: CPT | Performed by: INTERNAL MEDICINE

## 2021-01-01 PROCEDURE — 86140 C-REACTIVE PROTEIN: CPT | Performed by: INTERNAL MEDICINE

## 2021-01-01 PROCEDURE — 81001 URINALYSIS AUTO W/SCOPE: CPT | Performed by: INTERNAL MEDICINE

## 2021-01-01 PROCEDURE — 99232 SBSQ HOSP IP/OBS MODERATE 35: CPT | Performed by: INTERNAL MEDICINE

## 2021-01-01 PROCEDURE — 85025 COMPLETE CBC W/AUTO DIFF WBC: CPT | Performed by: INTERNAL MEDICINE

## 2021-01-01 PROCEDURE — 99233 SBSQ HOSP IP/OBS HIGH 50: CPT | Performed by: INTERNAL MEDICINE

## 2021-01-01 RX ORDER — LABETALOL 20 MG/4 ML (5 MG/ML) INTRAVENOUS SYRINGE
10 EVERY 6 HOURS PRN
Status: DISCONTINUED | OUTPATIENT
Start: 2021-01-01 | End: 2021-01-04 | Stop reason: HOSPADM

## 2021-01-01 RX ORDER — DIVALPROEX SODIUM 125 MG/1
250 CAPSULE, COATED PELLETS ORAL EVERY 12 HOURS SCHEDULED
Status: DISCONTINUED | OUTPATIENT
Start: 2021-01-01 | End: 2021-01-04 | Stop reason: HOSPADM

## 2021-01-01 RX ADMIN — SODIUM CHLORIDE 250 ML: 0.9 INJECTION, SOLUTION INTRAVENOUS at 20:12

## 2021-01-01 RX ADMIN — DIVALPROEX SODIUM 250 MG: 250 TABLET, DELAYED RELEASE ORAL at 09:55

## 2021-01-01 RX ADMIN — CARBAMAZEPINE 100 MG: 100 TABLET, CHEWABLE ORAL at 09:56

## 2021-01-01 RX ADMIN — MELATONIN TAB 3 MG 6 MG: 3 TAB at 22:54

## 2021-01-01 RX ADMIN — CARBAMAZEPINE 100 MG: 100 TABLET, CHEWABLE ORAL at 22:50

## 2021-01-01 RX ADMIN — ZINC SULFATE 220 MG (50 MG) CAPSULE 220 MG: CAPSULE at 09:55

## 2021-01-01 RX ADMIN — LEVOTHYROXINE SODIUM 100 MCG: 100 TABLET ORAL at 06:03

## 2021-01-01 RX ADMIN — LORAZEPAM 0.5 MG: 0.5 TABLET ORAL at 14:58

## 2021-01-01 RX ADMIN — NYSTATIN: 100000 POWDER TOPICAL at 12:07

## 2021-01-01 RX ADMIN — Medication 2000 UNITS: at 09:55

## 2021-01-01 RX ADMIN — APIXABAN 2.5 MG: 2.5 TABLET, FILM COATED ORAL at 18:12

## 2021-01-01 RX ADMIN — ASPIRIN 81 MG CHEWABLE TABLET 81 MG: 81 TABLET CHEWABLE at 09:55

## 2021-01-01 RX ADMIN — POLYETHYLENE GLYCOL 3350 17 G: 17 POWDER, FOR SOLUTION ORAL at 09:56

## 2021-01-01 RX ADMIN — CARBAMAZEPINE 100 MG: 100 TABLET, CHEWABLE ORAL at 16:47

## 2021-01-01 RX ADMIN — DIGOXIN 125 MCG: 125 TABLET ORAL at 12:01

## 2021-01-01 RX ADMIN — NYSTATIN: 100000 POWDER TOPICAL at 18:14

## 2021-01-01 RX ADMIN — REMDESIVIR 100 MG: 100 INJECTION, POWDER, LYOPHILIZED, FOR SOLUTION INTRAVENOUS at 12:06

## 2021-01-01 RX ADMIN — OXYCODONE HYDROCHLORIDE AND ACETAMINOPHEN 1000 MG: 500 TABLET ORAL at 09:55

## 2021-01-01 RX ADMIN — SENNOSIDES 8.6 MG: 8.6 TABLET ORAL at 09:55

## 2021-01-01 RX ADMIN — FAMOTIDINE 10 MG: 20 TABLET, FILM COATED ORAL at 09:56

## 2021-01-01 RX ADMIN — DIVALPROEX SODIUM 250 MG: 250 TABLET, DELAYED RELEASE ORAL at 23:10

## 2021-01-01 RX ADMIN — DEXAMETHASONE SODIUM PHOSPHATE 6 MG: 4 INJECTION, SOLUTION INTRAMUSCULAR; INTRAVENOUS at 01:58

## 2021-01-01 RX ADMIN — LABETALOL 20 MG/4 ML (5 MG/ML) INTRAVENOUS SYRINGE 10 MG: at 22:56

## 2021-01-01 RX ADMIN — ATORVASTATIN CALCIUM 20 MG: 20 TABLET, FILM COATED ORAL at 16:47

## 2021-01-01 RX ADMIN — LORAZEPAM 0.5 MG: 0.5 TABLET ORAL at 22:54

## 2021-01-01 RX ADMIN — BUPROPION 150 MG: 150 TABLET, EXTENDED RELEASE ORAL at 09:55

## 2021-01-01 RX ADMIN — CEFEPIME HYDROCHLORIDE 2000 MG: 2 INJECTION, POWDER, FOR SOLUTION INTRAVENOUS at 22:56

## 2021-01-01 NOTE — PROGRESS NOTES
Progress Note Dana Hartman [birth year], 77 y o  female MRN: [id number]    Unit/Bed#: [address] [facility] Encounter: [id number]    Primary Care Provider: Cande De La O DO   Date and time admitted to hospital: 12/29/2020  4:00 PM        * COVID-19 virus infection  Assessment & Plan  Recently diagnosed COVID-19 on 12/24  Presented to ED with fever and hypoxia saturating in mid 80s in 2 L of chronic supplemental oxygen  Mild disease based on oxygen  3-5 L requirement on presentation, patient uses 2 L of supplemental oxygen at baseline  CXR - without any acute abnormality  SARS-CoV-2 PCR positive-at outside facility on 12/24, retested 12/29  Hypoxia has improved currently saturating adequately on room air  Patient does not demonstrate significant pulmonary symptoms at present but remains at high risk of severe COVID-19 infection due to age and comorbidities    · Medication treatment started per COVID protocol:  · Dexamethasone 6 mg IV daily for 10 days  · Famotidine daily  · Started Remdesivir given improvement in renal function  · Check and trend inflammatory markers, CRP, ferritin  · Significantly elevated but downtrending  · Check troponin, proBNP, D-dimer  · Troponin minimally elevated and flat  · ProBNP 1 989  · D-dimer mildly elevated, downtrending    · Daily CBC, CMP  · Procalcitonin negative x2  · Encourage  proing if tolerated  · Increase activity and mobilization as tolerated, limited at baseline      Results from last 7 days   Lab Units 12/29/20  0932   SARS-COV-2  Positive*     Results from last 7 days   Lab Units 12/30/20  0454 12/29/20  0215 12/28/20  2150   CRP mg/L 38 4* 55 1*  --    FERRITIN ng/mL 1,165* 1,171*  --    D-DIMER QUANTITATIVE ug/ml FEU 0 69*  --  1 02*      Results from last 7 days   Lab Units 12/29/20  0215   PROCALCITONIN ng/ml 0 08      Results from last 7 days   Lab Units 12/29/20  0443 12/29/20  0215 12/28/20  2150   TROPONIN I ng/mL 0 06* 0 06* 0 05*         Gram-negative bacteremia  Assessment & Plan  Patient was noted to have 1/2 blood culture positive for Gram-negative rods  Likely urinary source  · Follow-up UA, urine culture  · Continue IV cefepime  · Monitor clinically  · Consider abdominal imaging if urine culture is inconsistent with blood culture  · Infectious disease evaluation appreciated    Acute on chronic respiratory failure with hypoxia [facility])  Assessment & Plan  Patient is on chronic supplemental oxygen about 2 L  Noted to be hypoxic to mid 80s on 2 L of supplemental oxygen  · Now improved, Currently saturating in low to mid 90s on 2 l nc  · Continue treatment as above  · Incentive spirometry, activity as tolerated  · PT/OT    Sepsis ([facility] )  Assessment & Plan  As evidenced by fever, leukopenia and tachypnea prior to admission  Likely secondary to COVID-19 infection and Gram-negative bacteremia  Now afebrile clinically improving  · Continue antibiotics as below  · Monitor clinically      Elevated troponin  Assessment & Plan  Likely non MI troponin elevation in setting acute infection, hypoxia and renal insufficiency  Trend remains flat  Patient denies any chest pain  Continue medical management    COPD (chronic obstructive pulmonary disease) ([facility] )  Assessment & Plan  No evidence of exacerbation  Chest x-ray unremarkable  Continue bronchodilators, Breo  Supplemental oxygen  Monitor respiratory status    Chronic diastolic heart failure (HCC)  Assessment & Plan  Wt Readings from Last 3 Encounters:   12/30/20 79 5 kg (175 lb 4 3 oz)   12/28/20 79 4 kg (175 lb 0 7 oz)   06/01/20 81 9 kg (180 lb 8 9 oz)       Prior echocardiogram with EF 45%  Currently appears euvolemic  Continue metoprolol  Resume Lasix in a m        Hyperlipidemia  Assessment & Plan  Continue statin    Ambulatory dysfunction  Assessment & Plan  PT/OT    Atrial fibrillation [facility])  Assessment & Plan  Rate controlled  Continue metoprolol, digoxin, Eliquis  Follow-up digoxin level -0 4    Chronic indwelling Davis catheter  Assessment & Plan  Patient with chronic indwelling Davis catheter  Likely source of Gram-negative bacteremia  Follow-up UA, urine culture  Consider cath exchange; patient reluctant when discussed  Essential hypertension  Assessment & Plan  Stable  Continue metoprolol    Coronary artery disease involving native coronary artery of native heart without angina pectoris  Assessment & Plan  Continue aspirin, metoprolol, statin    Hypothyroidism  Assessment & Plan  Resumed Synthroid  Follow-up thyroid profile as outpatient 4-6 weeks          VTE Pharmacologic Prophylaxis:   Pharmacologic: Enoxaparin (Lovenox)  Mechanical VTE Prophylaxis in Place: Yes    Patient Centered Rounds: I have performed bedside rounds with nursing staff today  Discussions with Specialists or Other Care Team Provider:  Case management, attending  Education and Discussions with Family / Patient:  I spoke with the patient at the bedside, I have answered all questions to the best of my abilities family provided update via phone    Time Spent for Care: 30 minutes  More than 50% of total time spent on counseling and coordination of care as described above  Current Length of Stay: 2 day(s)    Current Patient Status: Inpatient   Certification Statement: The patient will continue to require additional inpatient hospital stay due to O2 requirements, IV cefepime, dexamethasone, remdesivir  Discharge Plan:  Not stable for discharge today    Code Status: Level 1 - Full Code      Subjective:   Patient seen lying in bed, chronically of ill-appearing  Generalized weakness  She is pleasant interactive  She is able to tell me her name, that it was the end of the year , that she was in [facility] however was not aware that she was in [address] [place] [address]  Reports that she feels tired      Objective:     Vitals:   Temp (24hrs), Av 1 °F (37 3 °C), Min:98 3 °F (36 8 °C), Max:99 8 °F (37 7 °C)    Temp:  [98 3 °F (36 8 °C)-99 8 °F (37 7 °C)] 98 3 °F (36 8 °C)  HR:  [50-95] 50  Resp:  [16-20] 20  BP: ()/(50-90) 100/55  SpO2:  [91 %-97 %] 97 %  Body mass index is 32 3 kg/m²  Input and Output Summary (last 24 hours): Intake/Output Summary (Last 24 hours) at 12/31/2020 1908  Last data filed at 12/31/2020 1801  Gross per 24 hour   Intake 750 ml   Output 1150 ml   Net -400 ml       Physical Exam:     Physical Exam  Vitals signs and nursing note reviewed  Constitutional:       Appearance: She is obese  Comments: Chronically ill-appearing female   HENT:      Head: Normocephalic  Nose: Nose normal       Mouth/Throat:      Mouth: Mucous membranes are moist       Pharynx: Oropharynx is clear  Eyes:      Extraocular Movements: Extraocular movements intact  Neck:      Musculoskeletal: Normal range of motion  Cardiovascular:      Rate and Rhythm: Normal rate and regular rhythm  Pulses: Normal pulses  Heart sounds: Murmur present  Pulmonary:      Comments: Diminished bilaterally  Abdominal:      General: Bowel sounds are normal       Palpations: Abdomen is soft  Genitourinary:     Comments: Chronic Davis catheter present  Musculoskeletal:         General: No swelling  Comments: Generalized weakness   Skin:     Capillary Refill: Capillary refill takes less than 2 seconds  Coloration: Skin is pale  Neurological:      Comments: Patient was able to tell me her name, that it was the end of the year, that she was at [facility], was not aware that she was in [place] however     Psychiatric:      Comments: Pleasant and cooperative with exam         Additional Data:     Labs:    Results from last 7 days   Lab Units 12/31/20  0528   WBC Thousand/uL 3 05*   HEMOGLOBIN g/dL 12 5   HEMATOCRIT % 40 9   PLATELETS Thousands/uL 96*   NEUTROS PCT % 44   LYMPHS PCT % 36   MONOS PCT % 17*   EOS PCT % 0     Results from last 7 days   Lab Units 12/31/20  0528   POTASSIUM mmol/L 3 9   CHLORIDE mmol/L 107 CO2 mmol/L 29   BUN mg/dL 27*   CREATININE mg/dL 1 47*   CALCIUM mg/dL 9 0   ALK PHOS U/L 99   ALT U/L 39   AST U/L 57*     Results from last 7 days   Lab Units 12/30/20  0454   INR  1 23*       * I Have Reviewed All Lab Data Listed Above  * Additional Pertinent Lab Tests Reviewed: All Labs Within Last 24 Hours Reviewed    Imaging:    Imaging Reports Reviewed Today Include:  None  Imaging Personally Reviewed by Myself Includes:  None    Recent Cultures (last 7 days):     Results from last 7 days   Lab Units 12/30/20  0457 12/29/20  0215   BLOOD CULTURE   --  No Growth at 48 hrs    Citrobacter freundii*   GRAM STAIN RESULT   --  Gram negative rods*   URINE CULTURE  >100,000 cfu/ml Gamma Hemolytic Streptococcus  20,000-29,000 cfu/ml Citrobacter freundii*  --        Last 24 Hours Medication List:   Current Facility-Administered Medications   Medication Dose Route Frequency Provider Last Rate    acetaminophen  975 mg Oral Q6H PRN Sven Cornejo MD      albuterol  2 puff Inhalation Q6H PRN Sven Cornejo MD      apixaban  2 5 mg Oral BID Sven Cornejo MD      ascorbic acid  1,000 mg Oral Q12H Willis Burleson MD      aspirin  81 mg Oral Daily Sven Cornejo MD      atorvastatin  20 mg Oral Daily With William Yost MD      bisacodyl  10 mg Rectal Daily PRN Sven Cornejo MD      buPROPion  150 mg Oral Daily Sven Cornejo MD      carBAMazepine  100 mg Oral TID Sven Cornejo MD      cefepime  2,000 mg Intravenous Q24H Sven Cornejo MD 2,000 mg (12/30/20 2124)    cholecalciferol  2,000 Units Oral Daily Sven Cornejo MD      dexamethasone  6 mg Intravenous Q24H Sven Cornejo MD      digoxin  125 mcg Oral Daily Sven Cornejo MD      divalproex sodium  250 mg Oral Q12H Willis Burleson MD      famotidine  10 mg Oral Daily Sven Cornejo MD      fluticasone-vilanterol  1 puff Inhalation Daily Sven Cornejo MD      furosemide  40 mg Oral Daily [facility] Tio Montero MD      haloperidol  1 mg Oral Daily PRN Elex People, MD      HYDROmorphone  2 mg Oral Q6H PRN Elex People, MD      hyoscyamine  0 125 mg Sublingual Q4H PRN Elex People, MD      levothyroxine  100 mcg Oral Early Morning Elex People, MD      LORazepam  0 5 mg Oral HS Elex People, MD      LORazepam  0 5 mg Oral Q6H PRN Elex People, MD      melatonin  6 mg Oral HS Elex People, MD      metoprolol tartrate  100 mg Oral Q12H [address] Elex People, MD      zinc sulfate  220 mg Oral Daily Elex People, MD      Followed by   Patrick Bruner ON 1/5/2021] multivitamin-minerals  1 tablet Oral Daily Elex People, MD      nystatin   Topical BID Elex People, MD      ondansetron  4 mg Intravenous Q6H PRN Elex People, MD      polyethylene glycol  17 g Oral Daily Elex People, MD      remdesivir  100 mg Intravenous Q24H Liv Dhaliwal DO      senna  1 tablet Oral Daily Elex People, MD          Today, Patient Was Seen By: TANVI Knapp    ** Please Note: Dictation voice to text software may have been used in the creation of this document   **

## 2021-01-01 NOTE — PLAN OF CARE
Problem: Potential for Falls  Goal: Patient will remain free of falls  Description: INTERVENTIONS:  - Assess patient frequently for physical needs  -  Identify cognitive and physical deficits and behaviors that affect risk of falls    -  [place] fall precautions as indicated by assessment   - Educate patient/family on patient safety including physical limitations  - Instruct patient to call for assistance with activity based on assessment  - Modify environment to reduce risk of injury  - Consider OT/PT consult to assist with strengthening/mobility  1/1/2021 1058 by Anatoly Lucio RN  Outcome: Progressing  1/1/2021 1057 by Anatoly Lucio RN  Outcome: Progressing     Problem: NEUROSENSORY - ADULT  Goal: Achieves stable or improved neurological status  Description: INTERVENTIONS  - Monitor and report changes in neurological status  - Monitor vital signs such as temperature, blood pressure, glucose, and any other labs ordered   - Initiate measures to prevent increased intracranial pressure  - Monitor for seizure activity and implement precautions if appropriate      1/1/2021 1058 by Anatoly Lucio RN  Outcome: Progressing  1/1/2021 1057 by Anatoly Lucio RN  Outcome: Progressing  Goal: Remains free of injury related to seizures activity  Description: INTERVENTIONS  - Maintain airway, patient safety  and administer oxygen as ordered  - Monitor patient for seizure activity, document and report duration and description of seizure to physician/advanced practitioner  - If seizure occurs,  ensure patient safety during seizure  - Reorient patient post seizure  - Seizure pads on all 4 side rails  - Instruct patient/family to notify RN of any seizure activity including if an aura is experienced  - Instruct patient/family to call for assistance with activity based on nursing assessment  - Administer anti-seizure medications if ordered    1/1/2021 1058 by Anatoly Lucio RN  Outcome: Progressing  1/1/2021 1057 by Luke Dhillon RN  Outcome: Progressing  Goal: Achieves maximal functionality and self care  Description: INTERVENTIONS  - Monitor swallowing and airway patency with patient fatigue and changes in neurological status  - Encourage and assist patient to increase activity and self care     - Encourage visually impaired, hearing impaired and aphasic patients to use assistive/communication devices  1/1/2021 1058 by Luke Dhillon RN  Outcome: Progressing  1/1/2021 1057 by Luke Dhillon RN  Outcome: Progressing     Problem: RESPIRATORY - ADULT  Goal: Achieves optimal ventilation and oxygenation  Description: INTERVENTIONS:  - Assess for changes in respiratory status  - Assess for changes in mentation and behavior  - Position to facilitate oxygenation and minimize respiratory effort  - Oxygen administered by appropriate delivery if ordered  - Initiate smoking cessation education as indicated  - Encourage broncho-pulmonary hygiene including cough, deep breathe, Incentive Spirometry  - Assess the need for suctioning and aspirate as needed  - Assess and instruct to report SOB or any respiratory difficulty  - Respiratory Therapy support as indicated  1/1/2021 1058 by Luke Dhillon RN  Outcome: Progressing  1/1/2021 1057 by Luke Dhillon RN  Outcome: Progressing

## 2021-01-01 NOTE — PLAN OF CARE
Problem: Potential for Falls  Goal: Patient will remain free of falls  Description: INTERVENTIONS:  - Assess patient frequently for physical needs  -  Identify cognitive and physical deficits and behaviors that affect risk of falls  -  [place] fall precautions as indicated by assessment   - Educate patient/family on patient safety including physical limitations  - Instruct patient to call for assistance with activity based on assessment  - Modify environment to reduce risk of injury  - Consider OT/PT consult to assist with strengthening/mobility  Outcome: Progressing     Problem: NEUROSENSORY - ADULT  Goal: Achieves stable or improved neurological status  Description: INTERVENTIONS  - Monitor and report changes in neurological status  - Monitor vital signs such as temperature, blood pressure, glucose, and any other labs ordered   - Initiate measures to prevent increased intracranial pressure  - Monitor for seizure activity and implement precautions if appropriate      Outcome: Progressing  Goal: Remains free of injury related to seizures activity  Description: INTERVENTIONS  - Maintain airway, patient safety  and administer oxygen as ordered  - Monitor patient for seizure activity, document and report duration and description of seizure to physician/advanced practitioner  - If seizure occurs,  ensure patient safety during seizure  - Reorient patient post seizure  - Seizure pads on all 4 side rails  - Instruct patient/family to notify RN of any seizure activity including if an aura is experienced  - Instruct patient/family to call for assistance with activity based on nursing assessment  - Administer anti-seizure medications if ordered    Outcome: Progressing  Goal: Achieves maximal functionality and self care  Description: INTERVENTIONS  - Monitor swallowing and airway patency with patient fatigue and changes in neurological status  - Encourage and assist patient to increase activity and self care     - Encourage visually impaired, hearing impaired and aphasic patients to use assistive/communication devices  Outcome: Progressing     Problem: RESPIRATORY - ADULT  Goal: Achieves optimal ventilation and oxygenation  Description: INTERVENTIONS:  - Assess for changes in respiratory status  - Assess for changes in mentation and behavior  - Position to facilitate oxygenation and minimize respiratory effort  - Oxygen administered by appropriate delivery if ordered  - Initiate smoking cessation education as indicated  - Encourage broncho-pulmonary hygiene including cough, deep breathe, Incentive Spirometry  - Assess the need for suctioning and aspirate as needed  - Assess and instruct to report SOB or any respiratory difficulty  - Respiratory Therapy support as indicated  Outcome: Progressing     Problem: METABOLIC, FLUID AND ELECTROLYTES - ADULT  Goal: Electrolytes maintained within normal limits  Description: INTERVENTIONS:  - Monitor labs and assess patient for signs and symptoms of electrolyte imbalances  - Administer electrolyte replacement as ordered  - Monitor response to electrolyte replacements, including repeat lab results as appropriate  - Instruct patient on fluid and nutrition as appropriate  Outcome: Progressing  Goal: Fluid balance maintained  Description: INTERVENTIONS:  - Monitor labs   - Monitor I/O and WT  - Instruct patient on fluid and nutrition as appropriate  - Assess for signs & symptoms of volume excess or deficit  Outcome: Progressing  Goal: Glucose maintained within target range  Description: INTERVENTIONS:  - Monitor Blood Glucose as ordered  - Assess for signs and symptoms of hyperglycemia and hypoglycemia  - Administer ordered medications to maintain glucose within target range  - Assess nutritional intake and initiate nutrition service referral as needed  Outcome: Progressing

## 2021-01-01 NOTE — PROGRESS NOTES
Progress Note - Infectious Disease   John Garvin 77 y o  female MRN: [id number]  Unit/Bed#: [address] 382-02 Encounter: [id number]      IMPRESSION & RECOMMENDATIONS:   Impression/Recommendations:  1  Sepsis, POA at [facility]   Leukopenia, fever, tachypnea   Likely secondary to gram-negative bacteremia   COVID-19 infection may also be playing a role  Fevers have improved   Patient is hemodynamically stable  Procalcitonin level remains low      -antibiotic plan as below  -monitor temperatures and hemodynamics  -monitor CBC  -supportive care     2  Citrobacter bacteremia   Most likely urinary source in the setting of chronic indwelling Davis catheter   No history of MDR infections       -continue renally dosed IV cefepime  Plan for 7 day course of IV antibiotic, through 1/4      3  UTI   In the setting of neurogenic bladder with chronic indwelling Davis catheter   With prior history of pansensitive E coli UTIs   Seems to be doing well on IV cefepime  Urine culture shows Citrobacter and Enterococcus faecalis  Will continue targeted antibiotic for Citrobacter, as this is what is present in blood culture      -antibiotic plan as above     4  COVID-19 infection   Positive PCR on 12/24 at nursing facility  [facility] be contributing to #1   Chest x-ray is negative  Patient is high risk for severe COVID-19 infection given advanced age and medical comorbidities   Inflammatory markers remain elevated  O2 sats remain stable on 3 L       -continue IV Remdesivir with plan for 5 day course   Monitor LFTs  -continue remainder of COVID pathway, including steroid   -monitor inflammatory markers  -monitor respiratory status     5  Acute on chronic hypoxic respiratory failure   On baseline 2 L nasal cannula   Likely in the setting of sepsis   Chest x-ray is negative  Stable on 3 L       -COVID treatment, as above  -continue IV steroid  -monitor O2 requirements     6  Acute kidney injury, on CKD 3   Creatinine has trended down and remains stable      -dose adjust antibiotic based on renal function  -recheck BMP in a m   -volume management per primary service     7  Baseline dementia   Resides in nursing facility      8  Penicillin allergy   Patient is tolerating IV cephalosporin without difficulty      Antibiotics:  Cefepime 4  Remdesivir 3     Ferritin 1171 > 1165 > 1499  CRP 55 > 38 > 81 > 82  Procalcitonin 0 08    Will formally re-evaluate patient on   Please call us with any new questions in the interim            Subjective:  No fevers  Remains on 2-3 L oxygen by nasal cannula  No acute events  Objective:  Vitals:  Temp:  [97 4 °F (36 3 °C)-98 3 °F (36 8 °C)] 97 4 °F (36 3 °C)  HR:  [50-56] 50  Resp:  [18-22] 18  BP: ()/(50-63) 140/63  SpO2:  [91 %-97 %] 92 %  Temp (24hrs), Av 7 °F (36 5 °C), Min:97 4 °F (36 3 °C), Max:98 3 °F (36 8 °C)  Current: Temperature: (!) 97 4 °F (36 3 °C)    Physical Exam:   Physical exam is limited due to infection control measures  General:  No acute distress, debilitated, nontoxic  HEENT:  Atraumatic normocephalic  Pulmonary:  Normal respiratory excursion without accessory muscle use  Abdomen:  Soft, nontender  Davis in place  Extremities:  No edema  Skin:  No diffuse rashes    Lab Results:  I have personally reviewed pertinent labs    Results from last 7 days   Lab Units 21  0454   POTASSIUM mmol/L 4 4 3 9 3 7   CHLORIDE mmol/L 109* 107 106   CO2 mmol/L 26 29 30   BUN mg/dL 54* 27* 25   CREATININE mg/dL 1 72* 1 47* 1 42*   EGFR ml/min/1 73sq m 29 35 36   CALCIUM mg/dL 9 1 9 0 8 7   AST U/L 50* 57* 35   ALT U/L 32 39 23   ALK PHOS U/L 96 99 78     Results from last 7 days   Lab Units 21  0520  0454   WBC Thousand/uL 3 77* 3 05* 3 07*   HEMOGLOBIN g/dL 12 2 12 5 12 3   PLATELETS Thousands/uL 108* 96* 87*     Results from last 7 days   Lab Units 20  0457 20  0440 20  0215   BLOOD CULTURE   --   --  No Growth at 72 hrs  Citrobacter freundii*   GRAM STAIN RESULT   --   --  Gram negative rods*   URINE CULTURE  >100,000 cfu/ml Enterococcus faecalis*  20,000-29,000 cfu/ml Citrobacter freundii*  --   --    MRSA CULTURE ONLY   --  No Methicillin Resistant Staphlyococcus aureus (MRSA) isolated  --        Imaging Studies:   I have personally reviewed pertinent imaging study reports and images in PACS  EKG, Pathology, and Other Studies:   I have personally reviewed pertinent reports

## 2021-01-01 NOTE — PLAN OF CARE
Problem: Potential for Falls  Goal: Patient will remain free of falls  Description: INTERVENTIONS:  - Assess patient frequently for physical needs  -  Identify cognitive and physical deficits and behaviors that affect risk of falls  -  [facility] fall precautions as indicated by assessment   - Educate patient/family on patient safety including physical limitations  - Instruct patient to call for assistance with activity based on assessment  - Modify environment to reduce risk of injury  - Consider OT/PT consult to assist with strengthening/mobility  Outcome: Progressing     Problem: NEUROSENSORY - ADULT  Goal: Achieves stable or improved neurological status  Description: INTERVENTIONS  - Monitor and report changes in neurological status  - Monitor vital signs such as temperature, blood pressure, glucose, and any other labs ordered   - Initiate measures to prevent increased intracranial pressure  - Monitor for seizure activity and implement precautions if appropriate      Outcome: Progressing  Goal: Remains free of injury related to seizures activity  Description: INTERVENTIONS  - Maintain airway, patient safety  and administer oxygen as ordered  - Monitor patient for seizure activity, document and report duration and description of seizure to physician/advanced practitioner  - If seizure occurs,  ensure patient safety during seizure  - Reorient patient post seizure  - Seizure pads on all 4 side rails  - Instruct patient/family to notify RN of any seizure activity including if an aura is experienced  - Instruct patient/family to call for assistance with activity based on nursing assessment  - Administer anti-seizure medications if ordered    Outcome: Progressing  Goal: Achieves maximal functionality and self care  Description: INTERVENTIONS  - Monitor swallowing and airway patency with patient fatigue and changes in neurological status  - Encourage and assist patient to increase activity and self care     - Encourage visually impaired, hearing impaired and aphasic patients to use assistive/communication devices  Outcome: Progressing     Problem: RESPIRATORY - ADULT  Goal: Achieves optimal ventilation and oxygenation  Description: INTERVENTIONS:  - Assess for changes in respiratory status  - Assess for changes in mentation and behavior  - Position to facilitate oxygenation and minimize respiratory effort  - Oxygen administered by appropriate delivery if ordered  - Initiate smoking cessation education as indicated  - Encourage broncho-pulmonary hygiene including cough, deep breathe, Incentive Spirometry  - Assess the need for suctioning and aspirate as needed  - Assess and instruct to report SOB or any respiratory difficulty  - Respiratory Therapy support as indicated  Outcome: Progressing     Problem: METABOLIC, FLUID AND ELECTROLYTES - ADULT  Goal: Electrolytes maintained within normal limits  Description: INTERVENTIONS:  - Monitor labs and assess patient for signs and symptoms of electrolyte imbalances  - Administer electrolyte replacement as ordered  - Monitor response to electrolyte replacements, including repeat lab results as appropriate  - Instruct patient on fluid and nutrition as appropriate  Outcome: Progressing  Goal: Fluid balance maintained  Description: INTERVENTIONS:  - Monitor labs   - Monitor I/O and WT  - Instruct patient on fluid and nutrition as appropriate  - Assess for signs & symptoms of volume excess or deficit  Outcome: Progressing  Goal: Glucose maintained within target range  Description: INTERVENTIONS:  - Monitor Blood Glucose as ordered  - Assess for signs and symptoms of hyperglycemia and hypoglycemia  - Administer ordered medications to maintain glucose within target range  - Assess nutritional intake and initiate nutrition service referral as needed  Outcome: Progressing     Problem: SKIN/TISSUE INTEGRITY - ADULT  Goal: Skin integrity remains intact  Description: INTERVENTIONS  - Identify patients at risk for skin breakdown  - Assess and monitor skin integrity  - Assess and monitor nutrition and hydration status  - Monitor labs (i e  albumin)  - Assess for incontinence   - Turn and reposition patient  - Assist with mobility/ambulation  - Relieve pressure over bony prominences  - Avoid friction and shearing  - Provide appropriate hygiene as needed including keeping skin clean and dry  - Evaluate need for skin moisturizer/barrier cream  - Collaborate with interdisciplinary team (i e  Nutrition, Rehabilitation, etc )   - Patient/family teaching  Outcome: Progressing  Goal: Incision(s), wounds(s) or drain site(s) healing without S/S of infection  Description: INTERVENTIONS  - Assess and document risk factors for skin impairment   - Assess and document dressing, incision, wound bed, drain sites and surrounding tissue  - Consider nutrition services referral as needed  - Oral mucous membranes remain intact  - Provide patient/ family education  Outcome: Progressing  Goal: Oral mucous membranes remain intact  Description: INTERVENTIONS  - Assess oral mucosa and hygiene practices  - Implement preventative oral hygiene regimen  - Implement oral medicated treatments as ordered  - Initiate Nutrition services referral as needed  Outcome: Progressing     Problem: MUSCULOSKELETAL - ADULT  Goal: Maintain or return mobility to safest level of function  Description: INTERVENTIONS:  - Assess patient's ability to carry out ADLs; assess patient's baseline for ADL function and identify physical deficits which impact ability to perform ADLs (bathing, care of mouth/teeth, toileting, grooming, dressing, etc )  - Assess/evaluate cause of self-care deficits   - Assess range of motion  - Assess patient's mobility  - Assess patient's need for assistive devices and provide as appropriate  - Encourage maximum independence but intervene and supervise when necessary  - Involve family in performance of ADLs  - Assess for home care needs following discharge   - Consider OT consult to assist with ADL evaluation and planning for discharge  - Provide patient education as appropriate  Outcome: Progressing  Goal: Maintain proper alignment of affected body part  Description: INTERVENTIONS:  - Support, maintain and protect limb and body alignment  - Provide patient/ family with appropriate education  Outcome: Progressing     Problem: Prexisting or High Potential for Compromised Skin Integrity  Goal: Skin integrity is maintained or improved  Description: INTERVENTIONS:  - Identify patients at risk for skin breakdown  - Assess and monitor skin integrity  - Assess and monitor nutrition and hydration status  - Monitor labs   - Assess for incontinence   - Turn and reposition patient  - Assist with mobility/ambulation  - Relieve pressure over bony prominences  - Avoid friction and shearing  - Provide appropriate hygiene as needed including keeping skin clean and dry  - Evaluate need for skin moisturizer/barrier cream  - Collaborate with interdisciplinary team   - Patient/family teaching  - Consider wound care consult   Outcome: Progressing     Problem: Nutrition/Hydration-ADULT  Goal: Nutrient/Hydration intake appropriate for improving, restoring or maintaining nutritional needs  Description: Monitor and assess patient's nutrition/hydration status for malnutrition  Collaborate with interdisciplinary team and initiate plan and interventions as ordered  Monitor patient's weight and dietary intake as ordered or per policy  Utilize nutrition screening tool and intervene as necessary  Determine patient's food preferences and provide high-protein, high-caloric foods as appropriate       INTERVENTIONS:  - Monitor oral intake, urinary output, labs, and treatment plans  - Assess nutrition and hydration status and recommend course of action  - Evaluate amount of meals eaten  - Assist patient with eating if necessary   - Allow adequate time for meals  - Recommend/ encourage appropriate diets, oral nutritional supplements, and vitamin/mineral supplements  - Order, calculate, and assess calorie counts as needed  - Recommend, monitor, and adjust tube feedings and TPN/PPN based on assessed needs  - Assess need for intravenous fluids  - Provide specific nutrition/hydration education as appropriate  - Include patient/family/caregiver in decisions related to nutrition  Outcome: Progressing

## 2021-01-01 NOTE — PROGRESS NOTES
[facility] Internal Medicine Progress Note  Patient:  Josefa Hollins 77 y o  female   MRN: [id number]  PCP: Zurdo Majano DO  Unit/Bed#: [address] [place] [address] Encounter: [id number]  Date Of Visit: 01/01/21    Problem List:    Principal Problem:    COVID-19 virus infection  Active Problems:    Urinary tract infection associated with catheterization of urinary tract ([facility] )    Acute on chronic respiratory failure with hypoxia ([facility])    Gram-negative bacteremia    Sepsis (HCC)    COPD (chronic obstructive pulmonary disease) ([facility] )    Chronic diastolic heart failure (HCC)    Atrial fibrillation (HCC)    Elevated troponin    Hyperlipidemia    Chronic indwelling Davis catheter    Essential hypertension    Coronary artery disease involving native coronary artery of native heart without angina pectoris    Hypothyroidism    Ambulatory dysfunction      Assessment & Plan:    Sepsis ([facility] )  Assessment & Plan  As evidenced by fever, leukopenia and tachypnea prior to admission  Likely secondary to COVID-19 infection and Gram-negative bacteremia  Now afebrile clinically improving  · Continue antibiotics as below  · Monitor clinically      Gram-negative bacteremia  Assessment & Plan  Patient was noted to have 1/2 blood culture positive for Citrobacter  Secondary to urinary source, complicated with chronic indwelling Davis catheter  · Culture sensitivities noted  · Continue IV cefepime, planned 7 day treatment through 1/4  · Infectious disease evaluation appreciated    Acute on chronic respiratory failure with hypoxia [facility])  Assessment & Plan  Patient is on chronic supplemental oxygen about 2 L  Noted to be hypoxic to mid 80s on 2 L of supplemental oxygen  · Now improved, Currently saturating in low to mid 90s on 2 l nc  · Continue treatment as above  · Incentive spirometry, activity as tolerated  · PT/OT    Urinary tract infection associated with catheterization of urinary tract (HCC)  Assessment & Plan  Urine culture with growth of Citrobacter and Enterococcus faecalis  Infectious disease following, input appreciated  · Continue treatment with cefepime for Citrobacter given bacteremia    * COVID-19 virus infection  Assessment & Plan  Recently diagnosed COVID-19 on 12/24  Presented to ED with fever and hypoxia saturating in mid 80s in 2 L of chronic supplemental oxygen  Mild disease based on oxygen  3-5 L requirement on presentation, patient uses 2 L of supplemental oxygen at baseline  CXR - without any acute abnormality  SARS-CoV-2 PCR positive-at outside facility on 12/24, retested 12/29  Oxygenation remains stable saturating adequately on 2 L of supplemental oxygen  Patient does not demonstrate significant pulmonary symptoms at present but remains at high risk of severe COVID-19 infection due to age and comorbidities    Monitor closely given worsening of inflammatory markers  · Medication treatment started per COVID protocol:  · Dexamethasone 6 mg IV daily for 10 days  · Famotidine daily  · Continue Remdesivir given improvement in renal function  · Check and trend inflammatory markers, CRP, ferritin  · Significantly elevated and uptrending  · Check troponin, proBNP, D-dimer  · Troponin minimally elevated and flat  · ProBNP 1 989  · D-dimer mildly elevated, downtrending    · Daily CBC, CMP  · Procalcitonin negative x2  · Encourage  proing if tolerated  · Increase activity and mobilization as tolerated, limited at baseline      Results from last 7 days   Lab Units 12/29/20  0932   SARS-COV-2  Positive*     Results from last 7 days   Lab Units 01/01/21  0623 12/31/20  0528 12/30/20  0454 12/29/20  0215 12/28/20  2150   CRP mg/L 82 0* 81 4* 38 4* 55 1*  --    FERRITIN ng/mL 1,666* 1,499* 1,165* 1,171*  --    D-DIMER QUANTITATIVE ug/ml FEU 0 64* 0 69* 0 69*  --  1 02*      Results from last 7 days   Lab Units 12/30/20  0454 12/29/20  0215   PROCALCITONIN ng/ml 0 10 0 08      Results from last 7 days   Lab Units 12/29/20  0443 12/29/20  0215 12/28/20  2150   TROPONIN I ng/mL 0 06* 0 06* 0 05*         Atrial fibrillation (HCC)  Assessment & Plan  Rate controlled  Continue metoprolol, digoxin, Eliquis  Follow-up digoxin level -0 4    Chronic diastolic heart failure (HCC)  Assessment & Plan  Wt Readings from Last 3 Encounters:   01/01/21 80 kg (176 lb 5 9 oz)   12/28/20 79 4 kg (175 lb 0 7 oz)   06/01/20 81 9 kg (180 lb 8 9 oz)       Prior echocardiogram with EF 45%  Currently appears euvolemic  Continue metoprolol  Hold Lasix due to poor p o  Intake      COPD (chronic obstructive pulmonary disease) ([facility])  Assessment & Plan  No evidence of exacerbation  Chest x-ray unremarkable  Continue bronchodilators, Breo  Supplemental oxygen  Monitor respiratory status    Hypothyroidism  Assessment & Plan  Resumed Synthroid  Follow-up thyroid profile as outpatient 4-6 weeks    Coronary artery disease involving native coronary artery of native heart without angina pectoris  Assessment & Plan  Continue aspirin, metoprolol, statin    Essential hypertension  Assessment & Plan  Stable  Continue metoprolol    Chronic indwelling Davis catheter  Assessment & Plan  Patient with chronic indwelling Davis catheter  Will exchange the catheter    Hyperlipidemia  Assessment & Plan  Continue statin    Elevated troponin  Assessment & Plan  Likely non MI troponin elevation in setting acute infection, hypoxia and renal insufficiency  Trend remains flat  Patient denies any chest pain  Continue medical management    Ambulatory dysfunction  Assessment & Plan  PT/OT        VTE Pharmacologic Prophylaxis:   Pharmacologic: Apixaban (Eliquis)  Mechanical VTE Prophylaxis in Place: Yes    Patient Centered Rounds: I have performed bedside rounds with nursing staff today  Discussions with Specialists or Other Care Team Provider:  ID    Education and Discussions with Family / Patient:  Discussed with daughter over the phone    Time Spent for Care: 45 minutes    More than 50% of total time spent on counseling and coordination of care as described above  Current Length of Stay: 3 day(s)    Current Patient Status: Inpatient   Certification Statement: The patient will continue to require additional inpatient hospital stay due to Bacteremia, COVID pneumonia    Discharge Plan:  Back to facility when clinically improved    Code Status: Level 1 - Full Code, confirmed with daughter      Subjective:   Comfortably sitting in bed  Pleasantly confused  Denies chest pain or shortness of breath  Poor appetite, refused her medication earlier but after convincing she agreed to take  Denies headache or abdominal pain    Objective:     Vitals:   Temp (24hrs), Av 7 °F (36 5 °C), Min:97 4 °F (36 3 °C), Max:98 3 °F (36 8 °C)    Temp:  [97 4 °F (36 3 °C)-98 3 °F (36 8 °C)] 97 6 °F (36 4 °C)  HR:  [50-56] 55  Resp:  [18-22] 18  BP: ()/(50-64) 147/64  SpO2:  [91 %-97 %] 94 % on room air  Body mass index is 32 26 kg/m²  Input and Output Summary (last 24 hours): Intake/Output Summary (Last 24 hours) at 2021 1451  Last data filed at 2021 0601  Gross per 24 hour   Intake 180 ml   Output 750 ml   Net -570 ml       Physical Exam:     Physical Exam  Constitutional:       General: She is not in acute distress  Comments: Chronically ill, pleasant   Neck:      Musculoskeletal: Neck supple  Cardiovascular:      Rate and Rhythm: Normal rate  Heart sounds: Normal heart sounds  Pulmonary:      Effort: Pulmonary effort is normal  No respiratory distress  Breath sounds: Normal breath sounds  No wheezing or rales  Comments: Diminished but clear  Abdominal:      General: Bowel sounds are normal  There is no distension  Palpations: Abdomen is soft  Tenderness: There is no abdominal tenderness  There is no guarding or rebound  Genitourinary:     Comments: Davis catheter clear yellow urine  Skin:     General: Skin is warm and dry  Findings: No rash     Neurological:      General: No focal deficit present  Mental Status: She is alert  Mental status is at baseline  Cranial Nerves: No cranial nerve deficit  Additional Data:     Labs:    Results from last 7 days   Lab Units 01/01/21  0623   WBC Thousand/uL 3 77*   HEMOGLOBIN g/dL 12 2   HEMATOCRIT % 42 6   PLATELETS Thousands/uL 108*   NEUTROS PCT % 56   LYMPHS PCT % 32   MONOS PCT % 9   EOS PCT % 0     Results from last 7 days   Lab Units 01/01/21  0623   POTASSIUM mmol/L 4 4   CHLORIDE mmol/L 109*   CO2 mmol/L 26   BUN mg/dL 54*   CREATININE mg/dL 1 72*   CALCIUM mg/dL 9 1   ALK PHOS U/L 96   ALT U/L 32   AST U/L 50*     Results from last 7 days   Lab Units 12/30/20  0454   INR  1 23*       * I Have Reviewed All Lab Data Listed Above  * Additional Pertinent Lab Tests Reviewed: All Labs Within Last 24 Hours Reviewed      Imaging:  Imaging Reports Reviewed Today Include:  Chest x-ray    Recent Cultures (last 7 days):     Results from last 7 days   Lab Units 12/30/20  0457 12/29/20  0215   BLOOD CULTURE   --  No Growth at 72 hrs    Citrobacter freundii*   GRAM STAIN RESULT   --  Gram negative rods*   URINE CULTURE  >100,000 cfu/ml Enterococcus faecalis*  20,000-29,000 cfu/ml Citrobacter freundii*  --        Last 24 Hours Medication List:   Current Facility-Administered Medications   Medication Dose Route Frequency Provider Last Rate    acetaminophen  975 mg Oral Q6H PRN Chata Kingsley MD      albuterol  2 puff Inhalation Q6H PRN Chata Kingsley MD      apixaban  2 5 mg Oral BID Chata Kingsley MD      ascorbic acid  1,000 mg Oral Q12H Kate Rodriguez MD      aspirin  81 mg Oral Daily Chata Kingsley MD      atorvastatin  20 mg Oral Daily With Mir Robledo MD      bisacodyl  10 mg Rectal Daily PRN Chata Kingsley MD      buPROPion  150 mg Oral Daily Chata Kingsley MD      carBAMazepine  100 mg Oral TID Chata Kingsley MD      cefepime  2,000 mg Intravenous Q24H Chata Kingsley MD Stopped (12/31/20 2204)    cholecalciferol  2,000 Units Oral Daily Ivar Lock, MD      dexamethasone  6 mg Intravenous Q24H Ivar Lock, MD      digoxin  125 mcg Oral Daily Ivar Lock, MD      divalproex sodium  250 mg Oral Q12H [facility] Ivar Lock, MD      famotidine  10 mg Oral Daily Ivar Lock, MD      fluticasone-vilanterol  1 puff Inhalation Daily Ivar Lock, MD      haloperidol  1 mg Oral Daily PRN Ivar Lock, MD      HYDROmorphone  2 mg Oral Q6H PRN Ivar Lock, MD      hyoscyamine  0 125 mg Sublingual Q4H PRN Ivar Lock, MD      levothyroxine  100 mcg Oral Early Morning Ivar Lock, MD      LORazepam  0 5 mg Oral HS Ivar Lock, MD      LORazepam  0 5 mg Oral Q6H PRN Ivar Lock, MD      melatonin  6 mg Oral HS Ivar Lock, MD      metoprolol tartrate  100 mg Oral Q12H Brian Swain MD      zinc sulfate  220 mg Oral Daily Ivar Lock, MD      Followed by   Trino Aponte ON 1/5/2021] multivitamin-minerals  1 tablet Oral Daily Ivar Lock, MD      nystatin   Topical BID Ivar Lock, MD      ondansetron  4 mg Intravenous Q6H PRN Ivar Lock, MD      polyethylene glycol  17 g Oral Daily Ivar Lock, MD      remdesivir  100 mg Intravenous Q24H Prem Bullion, DO      senna  1 tablet Oral Daily Ivar Lock, MD            Today, Patient Was Seen By: Eduardo Simms MD    ** Please Note: "This note has been constructed using a voice recognition system  Therefore there may be syntax, spelling, and/or grammatical errors   Please call if you have any questions  "**

## 2021-01-02 LAB
BACTERIA UR CULT: ABNORMAL

## 2021-01-02 PROCEDURE — 99232 SBSQ HOSP IP/OBS MODERATE 35: CPT | Performed by: NURSE PRACTITIONER

## 2021-01-02 RX ADMIN — DIVALPROEX SODIUM 250 MG: 125 CAPSULE, COATED PELLETS ORAL at 11:24

## 2021-01-02 RX ADMIN — Medication 2000 UNITS: at 09:24

## 2021-01-02 RX ADMIN — ASPIRIN 81 MG CHEWABLE TABLET 81 MG: 81 TABLET CHEWABLE at 09:24

## 2021-01-02 RX ADMIN — NYSTATIN: 100000 POWDER TOPICAL at 18:58

## 2021-01-02 RX ADMIN — CARBAMAZEPINE 100 MG: 100 TABLET, CHEWABLE ORAL at 09:26

## 2021-01-02 RX ADMIN — CARBAMAZEPINE 100 MG: 100 TABLET, CHEWABLE ORAL at 17:00

## 2021-01-02 RX ADMIN — LORAZEPAM 0.5 MG: 0.5 TABLET ORAL at 21:12

## 2021-01-02 RX ADMIN — NYSTATIN: 100000 POWDER TOPICAL at 09:28

## 2021-01-02 RX ADMIN — SENNOSIDES 8.6 MG: 8.6 TABLET ORAL at 09:24

## 2021-01-02 RX ADMIN — POLYETHYLENE GLYCOL 3350 17 G: 17 POWDER, FOR SOLUTION ORAL at 09:24

## 2021-01-02 RX ADMIN — LEVOTHYROXINE SODIUM 100 MCG: 100 TABLET ORAL at 05:49

## 2021-01-02 RX ADMIN — FAMOTIDINE 10 MG: 20 TABLET, FILM COATED ORAL at 09:26

## 2021-01-02 RX ADMIN — MELATONIN TAB 3 MG 6 MG: 3 TAB at 21:12

## 2021-01-02 RX ADMIN — OXYCODONE HYDROCHLORIDE AND ACETAMINOPHEN 1000 MG: 500 TABLET ORAL at 21:12

## 2021-01-02 RX ADMIN — DEXAMETHASONE SODIUM PHOSPHATE 6 MG: 4 INJECTION, SOLUTION INTRAMUSCULAR; INTRAVENOUS at 02:02

## 2021-01-02 RX ADMIN — REMDESIVIR 100 MG: 100 INJECTION, POWDER, LYOPHILIZED, FOR SOLUTION INTRAVENOUS at 12:25

## 2021-01-02 RX ADMIN — CARBAMAZEPINE 100 MG: 100 TABLET, CHEWABLE ORAL at 21:12

## 2021-01-02 RX ADMIN — DIVALPROEX SODIUM 250 MG: 125 CAPSULE, COATED PELLETS ORAL at 21:13

## 2021-01-02 RX ADMIN — FLUTICASONE FUROATE AND VILANTEROL TRIFENATATE 1 PUFF: 200; 25 POWDER RESPIRATORY (INHALATION) at 09:27

## 2021-01-02 RX ADMIN — BUPROPION 150 MG: 150 TABLET, EXTENDED RELEASE ORAL at 09:26

## 2021-01-02 RX ADMIN — APIXABAN 2.5 MG: 2.5 TABLET, FILM COATED ORAL at 18:58

## 2021-01-02 RX ADMIN — DIGOXIN 125 MCG: 125 TABLET ORAL at 09:24

## 2021-01-02 RX ADMIN — CEFEPIME HYDROCHLORIDE 2000 MG: 2 INJECTION, POWDER, FOR SOLUTION INTRAVENOUS at 21:14

## 2021-01-02 RX ADMIN — OXYCODONE HYDROCHLORIDE AND ACETAMINOPHEN 1000 MG: 500 TABLET ORAL at 09:24

## 2021-01-02 RX ADMIN — ZINC SULFATE 220 MG (50 MG) CAPSULE 220 MG: CAPSULE at 09:24

## 2021-01-02 RX ADMIN — ATORVASTATIN CALCIUM 20 MG: 20 TABLET, FILM COATED ORAL at 17:30

## 2021-01-02 RX ADMIN — METOPROLOL TARTRATE 100 MG: 100 TABLET, FILM COATED ORAL at 09:26

## 2021-01-02 RX ADMIN — APIXABAN 2.5 MG: 2.5 TABLET, FILM COATED ORAL at 09:25

## 2021-01-02 RX ADMIN — METOPROLOL TARTRATE 100 MG: 100 TABLET, FILM COATED ORAL at 21:13

## 2021-01-02 NOTE — PROGRESS NOTES
Progress Note Marcela Amador [birth year], 77 y o  female MRN: [id number]    Unit/Bed#: [address]- Encounter: [id number]    Primary Care Provider: Greg Nelson DO   Date and time admitted to hospital: 12/29/2020  4:00 PM        Sepsis [facility])  Assessment & Plan  As evidenced by fever, leukopenia and tachypnea prior to admission  Likely secondary to COVID-19 infection and Gram-negative bacteremia  Now afebrile clinically improving  · Continue antibiotics as below  · Monitor clinically    Gram-negative bacteremia  Assessment & Plan  Patient was noted to have 1/2 blood culture positive for Citrobacter  Secondary to urinary source, complicated with chronic indwelling Davis catheter  · Culture sensitivities noted  · Continue IV cefepime, planned 7 day treatment through 1/4  · Infectious disease evaluation appreciated    Urinary tract infection associated with catheterization of urinary tract ([facility] )  Assessment & Plan  Urine culture with growth of Citrobacter and Enterococcus faecalis  Infectious disease following, input appreciated  · Continue treatment with cefepime 2 gm IV daily for Citrobacter given bacteremia, day 5/7    Acute on chronic respiratory failure with hypoxia [facility])  Assessment & Plan  Patient is on chronic supplemental oxygen about 2 L  Noted to be hypoxic to mid 80s on 2 L of supplemental oxygen  · Now improved, currently saturating in low to mid 90s on 2 l nc  · Continue treatment as above  · Incentive spirometry, activity as tolerated  · PT/OT    * COVID-19 virus infection  Assessment & Plan  Recently diagnosed COVID-19 on 12/24  Presented to ED with fever and hypoxia saturating in mid 80s in 2 L of chronic supplemental oxygen  Mild disease based on oxygen  3-5 L requirement on presentation, patient uses 2 L of supplemental oxygen at baseline  CXR - without any acute abnormality  SARS-CoV-2 PCR positive-at outside facility on 12/24, retested 12/29  Oxygenation remains stable saturating adequately on 2 L of supplemental oxygen  Patient does not demonstrate significant pulmonary symptoms at present but remains at high risk of severe COVID-19 infection due to age and comorbidities    Monitor closely given worsening of inflammatory markers  · Medication treatment started per COVID protocol:  · Dexamethasone 6 mg IV daily for 10 days  · Famotidine daily  · Continue Remdesivir given improvement in renal function  · Check and trend inflammatory markers  · Daily CBC, CMP  · Procalcitonin negative x2  · Encourage  proing if tolerated  · Increase activity and mobilization as tolerated, limited at baseline    Results from last 7 days   Lab Units 12/29/20  0932   SARS-COV-2  Positive*     Results from last 7 days   Lab Units 01/01/21  0623 12/31/20  0528 12/30/20  0454 12/29/20  0215 12/28/20  2150   CRP mg/L 82 0* 81 4* 38 4* 55 1*  --    FERRITIN ng/mL 1,666* 1,499* 1,165* 1,171*  --    D-DIMER QUANTITATIVE ug/ml FEU 0 64* 0 69* 0 69*  --  1 02*      Results from last 7 days   Lab Units 12/30/20  0454 12/29/20  0215   PROCALCITONIN ng/ml 0 10 0 08      Results from last 7 days   Lab Units 12/29/20  0443 12/29/20  0215 12/28/20  2150   TROPONIN I ng/mL 0 06* 0 06* 0 05*       Hypothyroidism  Assessment & Plan  Resumed Synthroid  Follow-up thyroid profile as outpatient 4-6 weeks    Coronary artery disease involving native coronary artery of native heart without angina pectoris  Assessment & Plan  Continue aspirin, metoprolol, statin    Essential hypertension  Assessment & Plan  Stable  Continue metoprolol    Chronic indwelling Baca catheter  Assessment & Plan  Patient with chronic indwelling baca catheter  Exchanged catheter 1/1/2021    Atrial fibrillation ([address] )  Assessment & Plan  Rate controlled  Continue Metoprolol, Digoxin, Eliquis  Follow-up digoxin level - 0 4    Ambulatory dysfunction  Assessment & Plan  PT/OT - will be discharged back to living facility with 24/7 hospice care     Hyperlipidemia  Assessment & Plan  Continue statin    Chronic diastolic heart failure (HCC)  Assessment & Plan  Wt Readings from Last 3 Encounters:   21 80 kg (176 lb 5 9 oz)   20 79 4 kg (175 lb 0 7 oz)   20 81 9 kg (180 lb 8 9 oz)     Prior echocardiogram with EF 45%  Currently appears euvolemic  Continue metoprolol and digoxin   Hold Lasix due to poor po intake    COPD (chronic obstructive pulmonary disease) (HCC)  Assessment & Plan  No evidence of exacerbation  Chest x-ray unremarkable  Continue bronchodilators, Breo  Supplemental oxygen  Monitor respiratory status    Elevated troponin  Assessment & Plan  Likely non MI troponin elevation in setting acute infection, hypoxia and renal insufficiency  Trend remains flat  Patient denies any chest pain  Continue medical management      VTE Pharmacologic Prophylaxis:   Pharmacologic: Apixaban (Eliquis)  Mechanical VTE Prophylaxis in Place: Yes    Patient Centered Rounds: I have performed bedside rounds with nursing staff today  Discussions with Specialists or Other Care Team Provider: nursing, cm, ID    Education and Discussions with Family / Patient: I have answered all questions to the best of my ability  Updated family by telephone  Time Spent for Care: 30 minutes  More than 50% of total time spent on counseling and coordination of care as described above  Current Length of Stay: 4 day(s)    Current Patient Status: Inpatient   Certification Statement: The patient will continue to require additional inpatient hospital stay due to Sepsis, UTI on IV ABX through Monday evening    Discharge Plan: Planning for discharge back to living facility on Tuesday    Code Status: Level 1 - Full Code      Subjective:   Patient seen and examined  Resting comfortably  Poor historian due to dementia  Pleasant  Follows commands  Minimal conversation  No issues per primary RN       Objective:     Vitals:   Temp (24hrs), Av 4 °F (36 3 °C), Min:97 2 °F (36 2 °C), Max:97 7 °F (36 5 °C)    Temp:  [97 2 °F (36 2 °C)-97 7 °F (36 5 °C)] 97 3 °F (36 3 °C)  HR:  [59-72] 72  Resp:  [18-20] 20  BP: (153-213)/(71-86) 153/71  SpO2:  [90 %-95 %] 91 %  Body mass index is 32 3 kg/m²  Input and Output Summary (last 24 hours): Intake/Output Summary (Last 24 hours) at 1/2/2021 1550  Last data filed at 1/1/2021 2039  Gross per 24 hour   Intake --   Output 100 ml   Net -100 ml       Physical Exam:     Physical Exam  Vitals signs and nursing note reviewed  Constitutional:       General: She is not in acute distress  Appearance: She is well-developed  She is obese  She is ill-appearing (chronically ill appearing )  She is not diaphoretic  Comments: Pleasantly confused resting in bed on 2 5 L nasal cannula   HENT:      Head: Normocephalic  Neck:      Musculoskeletal: Normal range of motion  Cardiovascular:      Rate and Rhythm: Normal rate and regular rhythm  Pulmonary:      Effort: Pulmonary effort is normal  No tachypnea or respiratory distress  Breath sounds: Examination of the right-lower field reveals decreased breath sounds  Examination of the left-lower field reveals decreased breath sounds  Decreased breath sounds present  No wheezing, rhonchi or rales  Abdominal:      General: Bowel sounds are normal  There is no distension  Palpations: Abdomen is soft  Tenderness: There is no abdominal tenderness  Genitourinary:     Comments: Chronic Davis catheter draining clear yellow urine  Musculoskeletal: Normal range of motion  General: No tenderness  Comments: Bilateral hands in soft mitten restraints   Skin:     General: Skin is warm and dry  Findings: No rash  Neurological:      Mental Status: She is alert  Mental status is at baseline  She is disoriented  Psychiatric:         Mood and Affect: Mood normal          Behavior: Behavior is slowed  Behavior is cooperative  Cognition and Memory: Cognition is impaired  Memory is impaired  Additional Data:     Labs:    Results from last 7 days   Lab Units 01/01/21  0623   WBC Thousand/uL 3 77*   HEMOGLOBIN g/dL 12 2   HEMATOCRIT % 42 6   PLATELETS Thousands/uL 108*   NEUTROS PCT % 56   LYMPHS PCT % 32   MONOS PCT % 9   EOS PCT % 0     Results from last 7 days   Lab Units 01/01/21  0623   POTASSIUM mmol/L 4 4   CHLORIDE mmol/L 109*   CO2 mmol/L 26   BUN mg/dL 54*   CREATININE mg/dL 1 72*   CALCIUM mg/dL 9 1   ALK PHOS U/L 96   ALT U/L 32   AST U/L 50*     Results from last 7 days   Lab Units 12/30/20  0454   INR  1 23*       * I Have Reviewed All Lab Data Listed Above  * Additional Pertinent Lab Tests Reviewed: All Labs Within Last 24 Hours Reviewed    Imaging:    Imaging Reports Reviewed Today Include: CXR  Imaging Personally Reviewed by Myself Includes:  None     Recent Cultures (last 7 days):     Results from last 7 days   Lab Units 12/30/20  0457 12/29/20  0215   BLOOD CULTURE   --  No Growth After 4 Days    Citrobacter freundii*   GRAM STAIN RESULT   --  Gram negative rods*   URINE CULTURE  >100,000 cfu/ml Enterococcus faecalis*  20,000-29,000 cfu/ml Citrobacter freundii*  <10,000 cfu/ml Candida sp  presumptively albicans*  --        Last 24 Hours Medication List:   Current Facility-Administered Medications   Medication Dose Route Frequency Provider Last Rate    acetaminophen  975 mg Oral Q6H PRN Esther Navas MD      albuterol  2 puff Inhalation Q6H PRN Esther Navas MD      apixaban  2 5 mg Oral BID Esther Navas MD      ascorbic acid  1,000 mg Oral Q12H [address] Tarsha Green MD      aspirin  81 mg Oral Daily Esther Navas MD      atorvastatin  20 mg Oral Daily With Venessa Samuel MD      bisacodyl  10 mg Rectal Daily PRN Esther Navas MD      buPROPion  150 mg Oral Daily Esther Navas MD      carBAMazepine  100 mg Oral TID Esther Navas MD      cefepime  2,000 mg Intravenous Q24H Esther Navas MD 2,000 mg (01/01/21 2282)    cholecalciferol 2,000 Units Oral Daily Juan Carlos Arreola MD      dexamethasone  6 mg Intravenous Q24H Juan Carlos Arreola MD      digoxin  125 mcg Oral Daily Juan Carlos Arreola MD      divalproex sodium  250 mg Oral Q12H [address] Meriam Pouch, CRNP      famotidine  10 mg Oral Daily Juan Carlos Arreola MD      fluticasone-vilanterol  1 puff Inhalation Daily Juan Carlos Arreola MD      haloperidol  1 mg Oral Daily PRN Juan Carlos Arreola MD      HYDROmorphone  2 mg Oral Q6H PRN Juan Carlos Arreola MD      hyoscyamine  0 125 mg Sublingual Q4H PRN Juan Carlos Arreola MD      Labetalol HCl  10 mg Intravenous Q6H PRN Meriam Pouch, CRNP      levothyroxine  100 mcg Oral Early Morning Juan Carlos Arreola MD      LORazepam  0 5 mg Oral HS Juan Carlos Arreola MD      LORazepam  0 5 mg Oral Q6H PRN Juan Carlos Arreola MD      melatonin  6 mg Oral HS Juan Carlos Arreola MD      metoprolol tartrate  100 mg Oral Q12H Esther Correia MD      zinc sulfate  220 mg Oral Daily Juan Carlos Arreola MD      Followed by   Juan Jose England ON 1/5/2021] multivitamin-minerals  1 tablet Oral Daily Juan Carlos Arreola MD      nystatin   Topical BID Juan Carlos Arreola MD      ondansetron  4 mg Intravenous Q6H PRN Juan Carlos Arreola MD      polyethylene glycol  17 g Oral Daily Juan Carlos Arreola MD      remdesivir  100 mg Intravenous Q24H Seth Patel DO      senna  1 tablet Oral Daily Juan Carlos Arreola MD          Today, Patient Was Seen By: Theresa Heimlich, CRNP    ** Please Note: Dictation voice to text software may have been used in the creation of this document   **

## 2021-01-02 NOTE — PLAN OF CARE
Problem: Potential for Falls  Goal: Patient will remain free of falls  Description: INTERVENTIONS:  - Assess patient frequently for physical needs  -  Identify cognitive and physical deficits and behaviors that affect risk of falls  -  [place] fall precautions as indicated by assessment   - Educate patient/family on patient safety including physical limitations  - Instruct patient to call for assistance with activity based on assessment  - Modify environment to reduce risk of injury  - Consider OT/PT consult to assist with strengthening/mobility  Outcome: Progressing     Problem: NEUROSENSORY - ADULT  Goal: Achieves stable or improved neurological status  Description: INTERVENTIONS  - Monitor and report changes in neurological status  - Monitor vital signs such as temperature, blood pressure, glucose, and any other labs ordered   - Initiate measures to prevent increased intracranial pressure  - Monitor for seizure activity and implement precautions if appropriate      Outcome: Progressing  Goal: Remains free of injury related to seizures activity  Description: INTERVENTIONS  - Maintain airway, patient safety  and administer oxygen as ordered  - Monitor patient for seizure activity, document and report duration and description of seizure to physician/advanced practitioner  - If seizure occurs,  ensure patient safety during seizure  - Reorient patient post seizure  - Seizure pads on all 4 side rails  - Instruct patient/family to notify RN of any seizure activity including if an aura is experienced  - Instruct patient/family to call for assistance with activity based on nursing assessment  - Administer anti-seizure medications if ordered    Outcome: Progressing  Goal: Achieves maximal functionality and self care  Description: INTERVENTIONS  - Monitor swallowing and airway patency with patient fatigue and changes in neurological status  - Encourage and assist patient to increase activity and self care     - Encourage visually impaired, hearing impaired and aphasic patients to use assistive/communication devices  Outcome: Progressing     Problem: RESPIRATORY - ADULT  Goal: Achieves optimal ventilation and oxygenation  Description: INTERVENTIONS:  - Assess for changes in respiratory status  - Assess for changes in mentation and behavior  - Position to facilitate oxygenation and minimize respiratory effort  - Oxygen administered by appropriate delivery if ordered  - Initiate smoking cessation education as indicated  - Encourage broncho-pulmonary hygiene including cough, deep breathe, Incentive Spirometry  - Assess the need for suctioning and aspirate as needed  - Assess and instruct to report SOB or any respiratory difficulty  - Respiratory Therapy support as indicated  Outcome: Progressing     Problem: METABOLIC, FLUID AND ELECTROLYTES - ADULT  Goal: Electrolytes maintained within normal limits  Description: INTERVENTIONS:  - Monitor labs and assess patient for signs and symptoms of electrolyte imbalances  - Administer electrolyte replacement as ordered  - Monitor response to electrolyte replacements, including repeat lab results as appropriate  - Instruct patient on fluid and nutrition as appropriate  Outcome: Progressing  Goal: Fluid balance maintained  Description: INTERVENTIONS:  - Monitor labs   - Monitor I/O and WT  - Instruct patient on fluid and nutrition as appropriate  - Assess for signs & symptoms of volume excess or deficit  Outcome: Progressing  Goal: Glucose maintained within target range  Description: INTERVENTIONS:  - Monitor Blood Glucose as ordered  - Assess for signs and symptoms of hyperglycemia and hypoglycemia  - Administer ordered medications to maintain glucose within target range  - Assess nutritional intake and initiate nutrition service referral as needed  Outcome: Progressing     Problem: SKIN/TISSUE INTEGRITY - ADULT  Goal: Skin integrity remains intact  Description: INTERVENTIONS  - Identify patients at risk for skin breakdown  - Assess and monitor skin integrity  - Assess and monitor nutrition and hydration status  - Monitor labs (i e  albumin)  - Assess for incontinence   - Turn and reposition patient  - Assist with mobility/ambulation  - Relieve pressure over bony prominences  - Avoid friction and shearing  - Provide appropriate hygiene as needed including keeping skin clean and dry  - Evaluate need for skin moisturizer/barrier cream  - Collaborate with interdisciplinary team (i e  Nutrition, Rehabilitation, etc )   - Patient/family teaching  Outcome: Progressing  Goal: Incision(s), wounds(s) or drain site(s) healing without S/S of infection  Description: INTERVENTIONS  - Assess and document risk factors for skin impairment   - Assess and document dressing, incision, wound bed, drain sites and surrounding tissue  - Consider nutrition services referral as needed  - Oral mucous membranes remain intact  - Provide patient/ family education  Outcome: Progressing  Goal: Oral mucous membranes remain intact  Description: INTERVENTIONS  - Assess oral mucosa and hygiene practices  - Implement preventative oral hygiene regimen  - Implement oral medicated treatments as ordered  - Initiate Nutrition services referral as needed  Outcome: Progressing     Problem: MUSCULOSKELETAL - ADULT  Goal: Maintain or return mobility to safest level of function  Description: INTERVENTIONS:  - Assess patient's ability to carry out ADLs; assess patient's baseline for ADL function and identify physical deficits which impact ability to perform ADLs (bathing, care of mouth/teeth, toileting, grooming, dressing, etc )  - Assess/evaluate cause of self-care deficits   - Assess range of motion  - Assess patient's mobility  - Assess patient's need for assistive devices and provide as appropriate  - Encourage maximum independence but intervene and supervise when necessary  - Involve family in performance of ADLs  - Assess for home care needs following discharge   - Consider OT consult to assist with ADL evaluation and planning for discharge  - Provide patient education as appropriate  Outcome: Progressing  Goal: Maintain proper alignment of affected body part  Description: INTERVENTIONS:  - Support, maintain and protect limb and body alignment  - Provide patient/ family with appropriate education  Outcome: Progressing     Problem: Prexisting or High Potential for Compromised Skin Integrity  Goal: Skin integrity is maintained or improved  Description: INTERVENTIONS:  - Identify patients at risk for skin breakdown  - Assess and monitor skin integrity  - Assess and monitor nutrition and hydration status  - Monitor labs   - Assess for incontinence   - Turn and reposition patient  - Assist with mobility/ambulation  - Relieve pressure over bony prominences  - Avoid friction and shearing  - Provide appropriate hygiene as needed including keeping skin clean and dry  - Evaluate need for skin moisturizer/barrier cream  - Collaborate with interdisciplinary team   - Patient/family teaching  - Consider wound care consult   Outcome: Progressing     Problem: Nutrition/Hydration-ADULT  Goal: Nutrient/Hydration intake appropriate for improving, restoring or maintaining nutritional needs  Description: Monitor and assess patient's nutrition/hydration status for malnutrition  Collaborate with interdisciplinary team and initiate plan and interventions as ordered  Monitor patient's weight and dietary intake as ordered or per policy  Utilize nutrition screening tool and intervene as necessary  Determine patient's food preferences and provide high-protein, high-caloric foods as appropriate       INTERVENTIONS:  - Monitor oral intake, urinary output, labs, and treatment plans  - Assess nutrition and hydration status and recommend course of action  - Evaluate amount of meals eaten  - Assist patient with eating if necessary   - Allow adequate time for meals  - Recommend/ encourage appropriate diets, oral nutritional supplements, and vitamin/mineral supplements  - Order, calculate, and assess calorie counts as needed  - Recommend, monitor, and adjust tube feedings and TPN/PPN based on assessed needs  - Assess need for intravenous fluids  - Provide specific nutrition/hydration education as appropriate  - Include patient/family/caregiver in decisions related to nutrition  Outcome: Progressing

## 2021-01-02 NOTE — ASSESSMENT & PLAN NOTE
Wt Readings from Last 3 Encounters:   01/01/21 80 kg (176 lb 5 9 oz)   12/28/20 79 4 kg (175 lb 0 7 oz)   06/01/20 81 9 kg (180 lb 8 9 oz)     Prior echocardiogram with EF 45%  Currently appears euvolemic  Continue metoprolol and digoxin   Hold Lasix due to poor po intake

## 2021-01-02 NOTE — ASSESSMENT & PLAN NOTE
Recently diagnosed COVID-19 on 12/24  Presented to ED with fever and hypoxia saturating in mid 80s in 2 L of chronic supplemental oxygen  Mild disease based on oxygen  3-5 L requirement on presentation, patient uses 2 L of supplemental oxygen at baseline  CXR - without any acute abnormality  SARS-CoV-2 PCR positive-at outside facility on 12/24, retested 12/29  Oxygenation remains stable saturating adequately on 2 L of supplemental oxygen  Patient does not demonstrate significant pulmonary symptoms at present but remains at high risk of severe COVID-19 infection due to age and comorbidities    Monitor closely given worsening of inflammatory markers  · Medication treatment started per COVID protocol:  · Dexamethasone 6 mg IV daily for 10 days  · Famotidine daily  · Continue Remdesivir given improvement in renal function  · Check and trend inflammatory markers  · Daily CBC, CMP  · Procalcitonin negative x2  · Encourage  proing if tolerated  · Increase activity and mobilization as tolerated, limited at baseline    Results from last 7 days   Lab Units 12/29/20  0932   SARS-COV-2  Positive*     Results from last 7 days   Lab Units 01/01/21  0623 12/31/20  0528 12/30/20  0454 12/29/20  0215 12/28/20  2150   CRP mg/L 82 0* 81 4* 38 4* 55 1*  --    FERRITIN ng/mL 1,666* 1,499* 1,165* 1,171*  --    D-DIMER QUANTITATIVE ug/ml FEU 0 64* 0 69* 0 69*  --  1 02*      Results from last 7 days   Lab Units 12/30/20  0454 12/29/20  0215   PROCALCITONIN ng/ml 0 10 0 08      Results from last 7 days   Lab Units 12/29/20  0443 12/29/20  0215 12/28/20  2150   TROPONIN I ng/mL 0 06* 0 06* 0 05*

## 2021-01-02 NOTE — ASSESSMENT & PLAN NOTE
Patient was noted to have 1/2 blood culture positive for Citrobacter  Secondary to urinary source, complicated with chronic indwelling Davis catheter  · Culture sensitivities noted  · Continue IV cefepime, planned 7 day treatment through 1/4  · Infectious disease evaluation appreciated

## 2021-01-03 LAB
ALBUMIN SERPL BCP-MCNC: 2.4 G/DL (ref 3.5–5)
ALP SERPL-CCNC: 91 U/L (ref 46–116)
ALT SERPL W P-5'-P-CCNC: 29 U/L (ref 12–78)
ANION GAP SERPL CALCULATED.3IONS-SCNC: 9 MMOL/L (ref 4–13)
AST SERPL W P-5'-P-CCNC: 31 U/L (ref 5–45)
BACTERIA BLD CULT: NORMAL
BASOPHILS # BLD MANUAL: 0 THOUSAND/UL (ref 0–0.1)
BASOPHILS NFR MAR MANUAL: 0 % (ref 0–1)
BILIRUB SERPL-MCNC: 0.4 MG/DL (ref 0.2–1)
BUN SERPL-MCNC: 50 MG/DL (ref 5–25)
CALCIUM ALBUM COR SERPL-MCNC: 10.4 MG/DL (ref 8.3–10.1)
CALCIUM SERPL-MCNC: 9.1 MG/DL (ref 8.3–10.1)
CHLORIDE SERPL-SCNC: 117 MMOL/L (ref 100–108)
CO2 SERPL-SCNC: 26 MMOL/L (ref 21–32)
CREAT SERPL-MCNC: 1.31 MG/DL (ref 0.6–1.3)
CRP SERPL QL: 58.5 MG/L
D DIMER PPP FEU-MCNC: 0.54 UG/ML FEU
EOSINOPHIL # BLD MANUAL: 0 THOUSAND/UL (ref 0–0.4)
EOSINOPHIL NFR BLD MANUAL: 0 % (ref 0–6)
ERYTHROCYTE [DISTWIDTH] IN BLOOD BY AUTOMATED COUNT: 13.6 % (ref 11.6–15.1)
FERRITIN SERPL-MCNC: 1201 NG/ML (ref 8–388)
GFR SERPL CREATININE-BSD FRML MDRD: 40 ML/MIN/1.73SQ M
GLUCOSE SERPL-MCNC: 179 MG/DL (ref 65–140)
HCT VFR BLD AUTO: 38.8 % (ref 34.8–46.1)
HGB BLD-MCNC: 11.4 G/DL (ref 11.5–15.4)
LYMPHOCYTES # BLD AUTO: 0.8 THOUSAND/UL (ref 0.6–4.47)
LYMPHOCYTES # BLD AUTO: 21 % (ref 14–44)
MAGNESIUM SERPL-MCNC: 2.6 MG/DL (ref 1.6–2.6)
MCH RBC QN AUTO: 31.6 PG (ref 26.8–34.3)
MCHC RBC AUTO-ENTMCNC: 29.4 G/DL (ref 31.4–37.4)
MCV RBC AUTO: 108 FL (ref 82–98)
MONOCYTES # BLD AUTO: 0.23 THOUSAND/UL (ref 0–1.22)
MONOCYTES NFR BLD: 6 % (ref 4–12)
NEUTROPHILS # BLD MANUAL: 2.71 THOUSAND/UL (ref 1.85–7.62)
NEUTS SEG NFR BLD AUTO: 71 % (ref 43–75)
NRBC BLD AUTO-RTO: 0 /100 WBCS
PHOSPHATE SERPL-MCNC: 3.1 MG/DL (ref 2.3–4.1)
PLATELET # BLD AUTO: 148 THOUSANDS/UL (ref 149–390)
PLATELET BLD QL SMEAR: ABNORMAL
PMV BLD AUTO: 11.8 FL (ref 8.9–12.7)
POTASSIUM SERPL-SCNC: 4.2 MMOL/L (ref 3.5–5.3)
PROT SERPL-MCNC: 6.9 G/DL (ref 6.4–8.2)
RBC # BLD AUTO: 3.61 MILLION/UL (ref 3.81–5.12)
RBC MORPH BLD: NORMAL
SODIUM SERPL-SCNC: 152 MMOL/L (ref 136–145)
TOTAL CELLS COUNTED SPEC: 100
VARIANT LYMPHS # BLD AUTO: 2 %
WBC # BLD AUTO: 3.81 THOUSAND/UL (ref 4.31–10.16)

## 2021-01-03 PROCEDURE — 83735 ASSAY OF MAGNESIUM: CPT | Performed by: NURSE PRACTITIONER

## 2021-01-03 PROCEDURE — 85027 COMPLETE CBC AUTOMATED: CPT | Performed by: NURSE PRACTITIONER

## 2021-01-03 PROCEDURE — 80053 COMPREHEN METABOLIC PANEL: CPT | Performed by: NURSE PRACTITIONER

## 2021-01-03 PROCEDURE — 85379 FIBRIN DEGRADATION QUANT: CPT | Performed by: NURSE PRACTITIONER

## 2021-01-03 PROCEDURE — 99232 SBSQ HOSP IP/OBS MODERATE 35: CPT | Performed by: NURSE PRACTITIONER

## 2021-01-03 PROCEDURE — 85007 BL SMEAR W/DIFF WBC COUNT: CPT | Performed by: NURSE PRACTITIONER

## 2021-01-03 PROCEDURE — 86140 C-REACTIVE PROTEIN: CPT | Performed by: NURSE PRACTITIONER

## 2021-01-03 PROCEDURE — 84100 ASSAY OF PHOSPHORUS: CPT | Performed by: NURSE PRACTITIONER

## 2021-01-03 PROCEDURE — 82728 ASSAY OF FERRITIN: CPT | Performed by: NURSE PRACTITIONER

## 2021-01-03 RX ORDER — DEXTROSE MONOHYDRATE 50 MG/ML
75 INJECTION, SOLUTION INTRAVENOUS CONTINUOUS
Status: DISCONTINUED | OUTPATIENT
Start: 2021-01-03 | End: 2021-01-04 | Stop reason: HOSPADM

## 2021-01-03 RX ADMIN — CARBAMAZEPINE 100 MG: 100 TABLET, CHEWABLE ORAL at 17:08

## 2021-01-03 RX ADMIN — OXYCODONE HYDROCHLORIDE AND ACETAMINOPHEN 1000 MG: 500 TABLET ORAL at 09:36

## 2021-01-03 RX ADMIN — ASPIRIN 81 MG CHEWABLE TABLET 81 MG: 81 TABLET CHEWABLE at 09:32

## 2021-01-03 RX ADMIN — METOPROLOL TARTRATE 100 MG: 100 TABLET, FILM COATED ORAL at 09:33

## 2021-01-03 RX ADMIN — CARBAMAZEPINE 100 MG: 100 TABLET, CHEWABLE ORAL at 22:52

## 2021-01-03 RX ADMIN — LORAZEPAM 0.5 MG: 0.5 TABLET ORAL at 22:49

## 2021-01-03 RX ADMIN — BUPROPION 150 MG: 150 TABLET, EXTENDED RELEASE ORAL at 09:34

## 2021-01-03 RX ADMIN — Medication 2000 UNITS: at 09:34

## 2021-01-03 RX ADMIN — ATORVASTATIN CALCIUM 20 MG: 20 TABLET, FILM COATED ORAL at 17:08

## 2021-01-03 RX ADMIN — POLYETHYLENE GLYCOL 3350 17 G: 17 POWDER, FOR SOLUTION ORAL at 09:35

## 2021-01-03 RX ADMIN — DIGOXIN 125 MCG: 125 TABLET ORAL at 08:32

## 2021-01-03 RX ADMIN — MELATONIN TAB 3 MG 6 MG: 3 TAB at 22:49

## 2021-01-03 RX ADMIN — ZINC SULFATE 220 MG (50 MG) CAPSULE 220 MG: CAPSULE at 09:34

## 2021-01-03 RX ADMIN — DEXAMETHASONE SODIUM PHOSPHATE 6 MG: 4 INJECTION, SOLUTION INTRAMUSCULAR; INTRAVENOUS at 01:23

## 2021-01-03 RX ADMIN — CEFEPIME HYDROCHLORIDE 2000 MG: 2 INJECTION, POWDER, FOR SOLUTION INTRAVENOUS at 21:30

## 2021-01-03 RX ADMIN — FAMOTIDINE 10 MG: 20 TABLET, FILM COATED ORAL at 09:35

## 2021-01-03 RX ADMIN — LEVOTHYROXINE SODIUM 100 MCG: 100 TABLET ORAL at 06:52

## 2021-01-03 RX ADMIN — NYSTATIN: 100000 POWDER TOPICAL at 17:08

## 2021-01-03 RX ADMIN — SENNOSIDES 8.6 MG: 8.6 TABLET ORAL at 09:32

## 2021-01-03 RX ADMIN — DIVALPROEX SODIUM 250 MG: 125 CAPSULE, COATED PELLETS ORAL at 22:53

## 2021-01-03 RX ADMIN — APIXABAN 2.5 MG: 2.5 TABLET, FILM COATED ORAL at 17:07

## 2021-01-03 RX ADMIN — METOPROLOL TARTRATE 100 MG: 100 TABLET, FILM COATED ORAL at 22:49

## 2021-01-03 RX ADMIN — APIXABAN 2.5 MG: 2.5 TABLET, FILM COATED ORAL at 09:33

## 2021-01-03 RX ADMIN — CARBAMAZEPINE 100 MG: 100 TABLET, CHEWABLE ORAL at 10:33

## 2021-01-03 RX ADMIN — DEXTROSE 50 ML/HR: 5 SOLUTION INTRAVENOUS at 11:37

## 2021-01-03 RX ADMIN — DIVALPROEX SODIUM 250 MG: 125 CAPSULE, COATED PELLETS ORAL at 09:35

## 2021-01-03 RX ADMIN — FLUTICASONE FUROATE AND VILANTEROL TRIFENATATE 1 PUFF: 200; 25 POWDER RESPIRATORY (INHALATION) at 09:33

## 2021-01-03 RX ADMIN — OXYCODONE HYDROCHLORIDE AND ACETAMINOPHEN 1000 MG: 500 TABLET ORAL at 22:49

## 2021-01-03 NOTE — ASSESSMENT & PLAN NOTE
Recently diagnosed COVID-19 on 12/24  Presented to ED with fever and hypoxia saturating in mid 80s in 2 L of chronic supplemental oxygen  Mild disease based on oxygen 3-5 L requirement on presentation, patient uses 2 L of supplemental oxygen at baseline  CXR - without any acute abnormality  SARS-CoV-2 PCR positive-at outside facility on 12/24, retested 12/29  · Oxygenation remains stable saturating adequately on 0-2 L of supplemental oxygen  · Patient does not demonstrate significant pulmonary symptoms at present but remains at high risk of severe COVID-19 infection due to age and comorbidities    · Medication treatment started per COVID protocol:  · Dexamethasone 6 mg IV daily   · Famotidine daily  · Transition to Prednisone 20 mg po daily for 4 more days   · Received 4 days of IV Remdesivir, will stop at this time due to improvement in symptoms   · Trend inflammatory markers  · Daily CBC, CMP  · Procalcitonin negative x2  · Encourage proing if tolerated  · Increase activity and mobilization as tolerated, limited at baseline    Results from last 7 days   Lab Units 12/29/20  0932   SARS-COV-2  Positive*     Results from last 7 days   Lab Units 01/03/21  0711 01/01/21  0623 12/31/20  0528 12/30/20  0454 12/29/20  0215 12/28/20  2150   CRP mg/L 58 5* 82 0* 81 4* 38 4* 55 1*  --    FERRITIN ng/mL 1,201* 1,666* 1,499* 1,165* 1,171*  --    D-DIMER QUANTITATIVE ug/ml FEU 0 54* 0 64* 0 69* 0 69*  --  1 02*      Results from last 7 days   Lab Units 12/30/20  0454 12/29/20  0215   PROCALCITONIN ng/ml 0 10 0 08      Results from last 7 days   Lab Units 12/29/20  0443 12/29/20  0215 12/28/20  2150   TROPONIN I ng/mL 0 06* 0 06* 0 05*

## 2021-01-03 NOTE — PROGRESS NOTES
Progress Note Filomena Jones [birth year], 77 y o  female MRN: [id number]    Unit/Bed#: [address] 068-00 Encounter: [id number]    Primary Care Provider: Ki Barreto DO   Date and time admitted to hospital: 12/29/2020  4:00 PM        Sepsis [facility])  Assessment & Plan  As evidenced by fever, leukopenia and tachypnea prior to admission  Likely secondary to COVID-19 infection and gram-negative bacteremia  Now afebrile clinically improving  · Continue antibiotics as below  · Monitor clinically    Gram-negative bacteremia  Assessment & Plan  Patient was noted to have 1/2 blood culture positive for Citrobacter  Secondary to urinary source, complicated with chronic indwelling Davis catheter  · Culture sensitivities noted  · Continue IV cefepime, planned 7 day treatment through 1/4  · Infectious disease evaluation appreciated    Urinary tract infection associated with catheterization of urinary tract ([facility] [address] )  Assessment & Plan  Urine culture with growth of Citrobacter and Enterococcus faecalis  Infectious disease following, input appreciated  · Continue treatment with cefepime 2 gm IV daily for Citrobacter given bacteremia, day 6/7    Acute on chronic respiratory failure with hypoxia [facility])  Assessment & Plan  Patient is on chronic supplemental oxygen about 2 L  Noted to be hypoxic to mid 80s on 2 L of supplemental oxygen  · Now improved, currently saturating 92 % on room air   · Continue treatment as above  · Incentive spirometry, activity as tolerated  · PT/OT    * COVID-19 virus infection  Assessment & Plan  Recently diagnosed COVID-19 on 12/24  Presented to ED with fever and hypoxia saturating in mid 80s in 2 L of chronic supplemental oxygen  Mild disease based on oxygen 3-5 L requirement on presentation, patient uses 2 L of supplemental oxygen at baseline  CXR - without any acute abnormality  SARS-CoV-2 PCR positive-at outside facility on 12/24, retested 12/29  · Oxygenation remains stable saturating adequately on 0-2 L of supplemental oxygen  · Patient does not demonstrate significant pulmonary symptoms at present but remains at high risk of severe COVID-19 infection due to age and comorbidities    · Medication treatment started per COVID protocol:  · Dexamethasone 6 mg IV daily for 10 days  · Famotidine daily  · Received 4 days of IV Remdesivir, will stop at this time due to improvement in symptoms   · Trend inflammatory markers  · Daily CBC, CMP  · Procalcitonin negative x2  · Encourage proing if tolerated  · Increase activity and mobilization as tolerated, limited at baseline    Results from last 7 days   Lab Units 12/29/20  0932   SARS-COV-2  Positive*     Results from last 7 days   Lab Units 01/03/21  0711 01/01/21  0623 12/31/20  0528 12/30/20  0454 12/29/20  0215 12/28/20  2150   CRP mg/L 58 5* 82 0* 81 4* 38 4* 55 1*  --    FERRITIN ng/mL 1,201* 1,666* 1,499* 1,165* 1,171*  --    D-DIMER QUANTITATIVE ug/ml FEU 0 54* 0 64* 0 69* 0 69*  --  1 02*      Results from last 7 days   Lab Units 12/30/20  0454 12/29/20  0215   PROCALCITONIN ng/ml 0 10 0 08      Results from last 7 days   Lab Units 12/29/20  0443 12/29/20  0215 12/28/20  2150   TROPONIN I ng/mL 0 06* 0 06* 0 05*       Hypothyroidism  Assessment & Plan  Resumed Synthroid  Follow-up thyroid profile as outpatient 4-6 weeks    Coronary artery disease involving native coronary artery of native heart without angina pectoris  Assessment & Plan  Continue aspirin, metoprolol, statin    Essential hypertension  Assessment & Plan  Stable  Continue metoprolol    Chronic indwelling Baca catheter  Assessment & Plan  Patient with chronic indwelling baca catheter  Exchanged catheter 1/1/2021    Atrial fibrillation ([address] )  Assessment & Plan  Rate controlled  Continue Metoprolol, Digoxin, Eliquis  Follow-up digoxin level - 0 4    Ambulatory dysfunction  Assessment & Plan  PT/OT - will be discharged back to living facility with 24/7 hospice care     Hyperlipidemia  Assessment & Plan  Continue statin    Chronic diastolic heart failure (HCC)  Assessment & Plan  Wt Readings from Last 3 Encounters:   01/03/21 80 3 kg (177 lb)   12/28/20 79 4 kg (175 lb 0 7 oz)   06/01/20 81 9 kg (180 lb 8 9 oz)     Prior echocardiogram with EF 45%  Currently appears dry   Continue metoprolol and digoxin   Hold Lasix due to poor po intake  Gentle IVF with D5W at 50 ml/hr    COPD (chronic obstructive pulmonary disease) ([facility])  Assessment & Plan  No evidence of exacerbation  Chest x-ray unremarkable  Continue bronchodilators, Breo  Supplemental oxygen  Monitor respiratory status    Elevated troponin  Assessment & Plan  Likely non MI troponin elevation in setting acute infection, hypoxia and renal insufficiency  Trend remains flat  Patient denies any chest pain  Continue medical management      VTE Pharmacologic Prophylaxis:   Pharmacologic: Apixaban (Eliquis)  Mechanical VTE Prophylaxis in Place: Yes    Patient Centered Rounds: I have performed bedside rounds with nursing staff today  Discussions with Specialists or Other Care Team Provider: nursing    Education and Discussions with Family / Patient: I have answered all questions to the best of my ability  Updated family by telephone  Time Spent for Care: 30 minutes  More than 50% of total time spent on counseling and coordination of care as described above  Current Length of Stay: 5 day(s)    Current Patient Status: Inpatient   Certification Statement: The patient will continue to require additional inpatient hospital stay due to Sepsis, UTI on IV ABX through Monday evening    Discharge Plan: Planning for discharge back to living facility on Monday or Tuesday    Code Status: Level 1 - Full Code      Subjective:   Pleasantly confused female resting in bed on room air  Per primary RN, patient with very poor oral intake  Patient only ate about 15 % of breakfast and refusing lunch  Nursing staff tried multiple times to feed her, but she refused   No other events over night  Objective:     Vitals:   Temp (24hrs), Av 2 °F (36 8 °C), Min:97 2 °F (36 2 °C), Max:99 3 °F (37 4 °C)    Temp:  [97 2 °F (36 2 °C)-99 3 °F (37 4 °C)] 98 7 °F (37 1 °C)  HR:  [58-78] 78  Resp:  [16-19] 18  BP: (157-178)/(69-86) 168/78  SpO2:  [92 %-97 %] 97 %  Body mass index is 32 37 kg/m²  Input and Output Summary (last 24 hours): Intake/Output Summary (Last 24 hours) at 1/3/2021 1342  Last data filed at 1/3/2021 0601  Gross per 24 hour   Intake --   Output 1000 ml   Net -1000 ml       Physical Exam:     Physical Exam  Constitutional:       General: She is not in acute distress  Appearance: She is obese  She is ill-appearing (elderly, appears weak and deconditioned )  She is not toxic-appearing  Comments: Pleasant female resting in bed on room air sating 92%   HENT:      Head: Normocephalic  Mouth/Throat:      Mouth: Mucous membranes are dry  Neck:      Musculoskeletal: Normal range of motion  Cardiovascular:      Rate and Rhythm: Normal rate and regular rhythm  Pulmonary:      Effort: Pulmonary effort is normal       Breath sounds: Normal breath sounds  No wheezing or rales  Abdominal:      General: Bowel sounds are normal  There is no distension  Palpations: Abdomen is soft  Comments: Obese    Genitourinary:     Comments: Davis catheter draining clear wanda urine   Musculoskeletal: Normal range of motion  Right lower leg: No edema  Left lower leg: No edema  Skin:     General: Skin is warm and dry  Capillary Refill: Capillary refill takes less than 2 seconds  Coloration: Skin is pale  Neurological:      Mental Status: She is alert  She is disoriented  Motor: Weakness (generalized weakness ) present  Comments: Alert to person, disorientated to place and time    Psychiatric:         Mood and Affect: Mood normal          Speech: Speech normal          Behavior: Behavior is cooperative           Cognition and Memory: Cognition is impaired  Judgment: Judgment is inappropriate  Additional Data:     Labs:    Results from last 7 days   Lab Units 01/03/21  0711 01/01/21  0623   WBC Thousand/uL 3 81* 3 77*   HEMOGLOBIN g/dL 11 4* 12 2   HEMATOCRIT % 38 8 42 6   PLATELETS Thousands/uL 148* 108*   NEUTROS PCT %  --  56   LYMPHS PCT %  --  32   LYMPHO PCT % 21  --    MONOS PCT %  --  9   MONO PCT % 6  --    EOS PCT % 0 0     Results from last 7 days   Lab Units 01/03/21  0711   POTASSIUM mmol/L 4 2   CHLORIDE mmol/L 117*   CO2 mmol/L 26   BUN mg/dL 50*   CREATININE mg/dL 1 31*   CALCIUM mg/dL 9 1   ALK PHOS U/L 91   ALT U/L 29   AST U/L 31     Results from last 7 days   Lab Units 12/30/20  0454   INR  1 23*       * I Have Reviewed All Lab Data Listed Above  * Additional Pertinent Lab Tests Reviewed: All Labs Within Last 24 Hours Reviewed    Imaging:    Imaging Reports Reviewed Today Include: CXR  Imaging Personally Reviewed by Myself Includes:  None     Recent Cultures (last 7 days):     Results from last 7 days   Lab Units 12/30/20  0457 12/29/20  0215   BLOOD CULTURE   --  No Growth After 5 Days    Citrobacter freundii*   GRAM STAIN RESULT   --  Gram negative rods*   URINE CULTURE  >100,000 cfu/ml Enterococcus faecalis*  20,000-29,000 cfu/ml Citrobacter freundii*  <10,000 cfu/ml Candida sp  presumptively albicans*  --        Last 24 Hours Medication List:   Current Facility-Administered Medications   Medication Dose Route Frequency Provider Last Rate    acetaminophen  975 mg Oral Q6H PRN Rosa Leal MD      albuterol  2 puff Inhalation Q6H PRN Rosa Leal MD      apixaban  2 5 mg Oral BID Rosa Leal MD      ascorbic acid  1,000 mg Oral Q12H 1101 Tarsha Green MD      aspirin  81 mg Oral Daily Rosa Leal MD      atorvastatin  20 mg Oral Daily With Lizett Patel MD      bisacodyl  10 mg Rectal Daily PRN Rosa Leal MD      buPROPion  150 mg Oral Daily Rosa Leal MD  carBAMazepine  100 mg Oral TID Ivette Coffey MD      cefepime  2,000 mg Intravenous Q24H Ivette Coffey MD 2,000 mg (01/02/21 2114)    cholecalciferol  2,000 Units Oral Daily Ivette Coffey MD      dexamethasone  6 mg Intravenous Q24H Ivette Coffey MD      dextrose  50 mL/hr Intravenous Continuous TANVI Stewart 50 mL/hr (01/03/21 1137)    digoxin  125 mcg Oral Daily Ivette Coffey MD      divalproex sodium  250 mg Oral Q12H [facility] TANVI Adler Ala      famotidine  10 mg Oral Daily Ivette Coffey MD      fluticasone-vilanterol  1 puff Inhalation Daily Ivette Coffey MD      haloperidol  1 mg Oral Daily PRN Ivette Coffey MD      HYDROmorphone  2 mg Oral Q6H PRN Ivette Coffey MD      hyoscyamine  0 125 mg Sublingual Q4H PRN Ivette Coffey MD      Labetalol HCl  10 mg Intravenous Q6H PRN TANVI Adler Ala      levothyroxine  100 mcg Oral Early Morning Ivette Coffey MD      LORazepam  0 5 mg Oral HS Ivette Coffey MD      LORazepam  0 5 mg Oral Q6H PRN Ivette Coffey MD      melatonin  6 mg Oral HS Ivette Coffey MD      metoprolol tartrate  100 mg Oral Q12H Blossom Hernandez MD      zinc sulfate  220 mg Oral Daily Ivette Coffey MD      Followed by   Amy Youssef ON 1/5/2021] multivitamin-minerals  1 tablet Oral Daily Ivette Coffey MD      nystatin   Topical BID Ivette Coffey MD      ondansetron  4 mg Intravenous Q6H PRN Ivette Coffey MD      polyethylene glycol  17 g Oral Daily Ivette Coffey MD      senna  1 tablet Oral Daily Ivette Coffey MD          Today, Patient Was Seen By: TANVI Stewart    ** Please Note: Dictation voice to text software may have been used in the creation of this document   **

## 2021-01-03 NOTE — ASSESSMENT & PLAN NOTE
Patient is on chronic supplemental oxygen about 2 L  Noted to be hypoxic to mid 80s on 2 L of supplemental oxygen  · Now improved, currently saturating 92 % on room air   · Continue treatment as above  · Incentive spirometry, activity as tolerated

## 2021-01-03 NOTE — ASSESSMENT & PLAN NOTE
· Urine culture with growth of Citrobacter and Enterococcus faecalis  · Infectious disease following, input appreciated  · Completed 7 days of IV cefepime

## 2021-01-03 NOTE — ASSESSMENT & PLAN NOTE
Patient was noted to have 1/2 blood culture positive for Citrobacter  Secondary to urinary source, complicated with chronic indwelling baca catheter  · Culture sensitivities noted  · Received 7 days of IV Cefepime on 1/4/2021  · Infectious disease evaluation appreciated

## 2021-01-03 NOTE — ASSESSMENT & PLAN NOTE
Wt Readings from Last 3 Encounters:   01/03/21 80 3 kg (177 lb)   12/28/20 79 4 kg (175 lb 0 7 oz)   06/01/20 81 9 kg (180 lb 8 9 oz)     Prior echocardiogram with EF 45%  Currently appears dry   Continue metoprolol and digoxin   Held Lasix due to poor po intake

## 2021-01-03 NOTE — ASSESSMENT & PLAN NOTE
As evidenced by fever, leukopenia and tachypnea prior to admission  Likely secondary to COVID-19 infection and gram-negative bacteremia  · Completed 7 days of IV cefepime  · Received 4 days of IV Remdesivir   · Received IV Decadron per COVID protocol  · Now afebrile clinically improving

## 2021-01-04 VITALS
WEIGHT: 177 LBS | DIASTOLIC BLOOD PRESSURE: 86 MMHG | HEIGHT: 62 IN | TEMPERATURE: 96.5 F | RESPIRATION RATE: 20 BRPM | OXYGEN SATURATION: 96 % | HEART RATE: 66 BPM | BODY MASS INDEX: 32.57 KG/M2 | SYSTOLIC BLOOD PRESSURE: 138 MMHG

## 2021-01-04 LAB
ALBUMIN SERPL BCP-MCNC: 2.6 G/DL (ref 3.5–5)
ALP SERPL-CCNC: 97 U/L (ref 46–116)
ALT SERPL W P-5'-P-CCNC: 29 U/L (ref 12–78)
ANION GAP SERPL CALCULATED.3IONS-SCNC: 9 MMOL/L (ref 4–13)
AST SERPL W P-5'-P-CCNC: 34 U/L (ref 5–45)
BASOPHILS # BLD MANUAL: 0 THOUSAND/UL (ref 0–0.1)
BASOPHILS NFR MAR MANUAL: 0 % (ref 0–1)
BILIRUB SERPL-MCNC: 0.7 MG/DL (ref 0.2–1)
BUN SERPL-MCNC: 35 MG/DL (ref 5–25)
CALCIUM ALBUM COR SERPL-MCNC: 10.3 MG/DL (ref 8.3–10.1)
CALCIUM SERPL-MCNC: 9.2 MG/DL (ref 8.3–10.1)
CHLORIDE SERPL-SCNC: 115 MMOL/L (ref 100–108)
CO2 SERPL-SCNC: 25 MMOL/L (ref 21–32)
CREAT SERPL-MCNC: 1.07 MG/DL (ref 0.6–1.3)
CRP SERPL QL: 61.9 MG/L
D DIMER PPP FEU-MCNC: 0.64 UG/ML FEU
EOSINOPHIL # BLD MANUAL: 0 THOUSAND/UL (ref 0–0.4)
EOSINOPHIL NFR BLD MANUAL: 0 % (ref 0–6)
ERYTHROCYTE [DISTWIDTH] IN BLOOD BY AUTOMATED COUNT: 13.2 % (ref 11.6–15.1)
FERRITIN SERPL-MCNC: 1170 NG/ML (ref 8–388)
GFR SERPL CREATININE-BSD FRML MDRD: 51 ML/MIN/1.73SQ M
GIANT PLATELETS BLD QL SMEAR: PRESENT
GLUCOSE SERPL-MCNC: 160 MG/DL (ref 65–140)
HCT VFR BLD AUTO: 39.6 % (ref 34.8–46.1)
HGB BLD-MCNC: 12.2 G/DL (ref 11.5–15.4)
LYMPHOCYTES # BLD AUTO: 1.11 THOUSAND/UL (ref 0.6–4.47)
LYMPHOCYTES # BLD AUTO: 21 % (ref 14–44)
MACROCYTES BLD QL AUTO: PRESENT
MAGNESIUM SERPL-MCNC: 2.3 MG/DL (ref 1.6–2.6)
MCH RBC QN AUTO: 32.1 PG (ref 26.8–34.3)
MCHC RBC AUTO-ENTMCNC: 30.8 G/DL (ref 31.4–37.4)
MCV RBC AUTO: 104 FL (ref 82–98)
MONOCYTES # BLD AUTO: 0.63 THOUSAND/UL (ref 0–1.22)
MONOCYTES NFR BLD: 12 % (ref 4–12)
NEUTROPHILS # BLD MANUAL: 3.54 THOUSAND/UL (ref 1.85–7.62)
NEUTS BAND NFR BLD MANUAL: 2 % (ref 0–8)
NEUTS SEG NFR BLD AUTO: 65 % (ref 43–75)
NRBC BLD AUTO-RTO: 0 /100 WBCS
PHOSPHATE SERPL-MCNC: 2.1 MG/DL (ref 2.3–4.1)
PLATELET # BLD AUTO: 164 THOUSANDS/UL (ref 149–390)
PLATELET BLD QL SMEAR: ADEQUATE
PMV BLD AUTO: 11.9 FL (ref 8.9–12.7)
POTASSIUM SERPL-SCNC: 3.5 MMOL/L (ref 3.5–5.3)
PROCALCITONIN SERPL-MCNC: 0.09 NG/ML
PROT SERPL-MCNC: 7.2 G/DL (ref 6.4–8.2)
RBC # BLD AUTO: 3.8 MILLION/UL (ref 3.81–5.12)
RBC MORPH BLD: PRESENT
SODIUM SERPL-SCNC: 149 MMOL/L (ref 136–145)
TOTAL CELLS COUNTED SPEC: 100
WBC # BLD AUTO: 5.29 THOUSAND/UL (ref 4.31–10.16)

## 2021-01-04 PROCEDURE — 85027 COMPLETE CBC AUTOMATED: CPT | Performed by: NURSE PRACTITIONER

## 2021-01-04 PROCEDURE — 84145 PROCALCITONIN (PCT): CPT | Performed by: NURSE PRACTITIONER

## 2021-01-04 PROCEDURE — 86140 C-REACTIVE PROTEIN: CPT | Performed by: NURSE PRACTITIONER

## 2021-01-04 PROCEDURE — 83735 ASSAY OF MAGNESIUM: CPT | Performed by: NURSE PRACTITIONER

## 2021-01-04 PROCEDURE — 82728 ASSAY OF FERRITIN: CPT | Performed by: NURSE PRACTITIONER

## 2021-01-04 PROCEDURE — 80053 COMPREHEN METABOLIC PANEL: CPT | Performed by: NURSE PRACTITIONER

## 2021-01-04 PROCEDURE — 99239 HOSP IP/OBS DSCHRG MGMT >30: CPT | Performed by: NURSE PRACTITIONER

## 2021-01-04 PROCEDURE — 85379 FIBRIN DEGRADATION QUANT: CPT | Performed by: NURSE PRACTITIONER

## 2021-01-04 PROCEDURE — 84100 ASSAY OF PHOSPHORUS: CPT | Performed by: NURSE PRACTITIONER

## 2021-01-04 PROCEDURE — 99232 SBSQ HOSP IP/OBS MODERATE 35: CPT | Performed by: INTERNAL MEDICINE

## 2021-01-04 PROCEDURE — 85007 BL SMEAR W/DIFF WBC COUNT: CPT | Performed by: NURSE PRACTITIONER

## 2021-01-04 RX ORDER — PREDNISONE 20 MG/1
20 TABLET ORAL DAILY
Qty: 20 TABLET | Refills: 0 | Status: SHIPPED | OUTPATIENT
Start: 2021-01-05 | End: 2021-01-09

## 2021-01-04 RX ORDER — FUROSEMIDE 20 MG/1
20 TABLET ORAL DAILY
Status: ON HOLD
Start: 2021-01-04 | End: 2021-08-09 | Stop reason: SDUPTHER

## 2021-01-04 RX ADMIN — NYSTATIN: 100000 POWDER TOPICAL at 18:00

## 2021-01-04 RX ADMIN — FLUTICASONE FUROATE AND VILANTEROL TRIFENATATE 1 PUFF: 200; 25 POWDER RESPIRATORY (INHALATION) at 12:39

## 2021-01-04 RX ADMIN — NYSTATIN: 100000 POWDER TOPICAL at 09:00

## 2021-01-04 RX ADMIN — LABETALOL 20 MG/4 ML (5 MG/ML) INTRAVENOUS SYRINGE 10 MG: at 18:06

## 2021-01-04 RX ADMIN — CEFEPIME HYDROCHLORIDE 2000 MG: 2 INJECTION, POWDER, FOR SOLUTION INTRAVENOUS at 13:38

## 2021-01-04 RX ADMIN — DEXAMETHASONE SODIUM PHOSPHATE 6 MG: 4 INJECTION, SOLUTION INTRAMUSCULAR; INTRAVENOUS at 04:03

## 2021-01-04 RX ADMIN — POTASSIUM PHOSPHATE, MONOBASIC AND POTASSIUM PHOSPHATE, DIBASIC 9 MMOL: 224; 236 INJECTION, SOLUTION INTRAVENOUS at 08:53

## 2021-01-04 NOTE — PROGRESS NOTES
Progress Note - Infectious Disease   Tamika Willoughby 77 y o  female MRN: [id number]  Unit/Bed#: [address]02 Encounter: [id number]      IMPRESSION & RECOMMENDATIONS:   Impression/Recommendations:  1  Sepsis, POA at [facility]   Leukopenia, fever, tachypnea   Likely secondary to gram-negative bacteremia   COVID-19 infection may also be playing a role  No further fevers  Patient is hemodynamically stable   Procalcitonin level remains low      -antibiotic plan as below  -monitor temperatures and hemodynamics  -monitor CBC  -supportive care     2  Citrobacter bacteremia   Most likely urinary source in the setting of chronic indwelling Davis catheter   No history of MDR infections       -continue renally dosed IV cefepime  Plan for 7 day course of IV antibiotic  Last day of antibiotic is today      3  UTI   In the setting of neurogenic bladder with chronic indwelling Davis catheter   With prior history of pansensitive E coli UTIs   Seems to be doing well on IV cefepime   Urine culture shows Citrobacter and Enterococcus faecalis  Will continue targeted antibiotic for Citrobacter, as this is what is present in blood culture      -antibiotic plan as above     4  COVID-19 infection   Positive PCR on 12/24 at nursing facility  [facility] be contributing to #1   Chest x-ray is negative  Patient is high risk for severe COVID-19 infection given advanced age and medical comorbidities   Inflammatory markers remain elevated but are trending downward  O2 requirements remain stable, currently on room air  Patient received course of Remdesivir      -no further antiviral indicated  -continue remainder of COVID pathway, including steroid   -monitor inflammatory markers  -monitor respiratory status     5   Acute on chronic hypoxic respiratory failure   On baseline 2 L nasal cannula   Likely in the setting of sepsis   Chest x-ray is negative   Remains stable currently on room air      -COVID treatment, as above  -continue IV steroid per protocol  -monitor O2 requirements     6  Acute kidney injury, on CKD 3  Creatinine has trended down and remains stable       -dose adjust antibiotic based on renal function  -monitor BMP  -volume management per primary service     7  Baseline dementia   Resides in nursing facility      8  Penicillin allergy   Patient is tolerating IV cephalosporin without difficulty      Antibiotics:  Cefepime 7  S/p Remdesivir      Ferritin 1171 > 1165 > 1499 > 1170  CRP 55 > 38 > 81 > 82 > 61  Procalcitonin 0 08 > 0 09    No ongoing acute ID issues  We will sign off  Please call us with any new questions  Subjective:  No fevers  Stable O2 requirements between room air to 2 L supplemental oxygen  Ongoing poor appetite  No new reported events  Objective:  Vitals:  Temp:  [98 8 °F (37 1 °C)] 98 8 °F (37 1 °C)  HR:  [77] 77  Resp:  [18] 18  BP: (169)/(80) 169/80  SpO2:  [97 %] 97 %  Temp (24hrs), Av 8 °F (37 1 °C), Min:98 8 °F (37 1 °C), Max:98 8 °F (37 1 °C)  Current: Temperature: 98 8 °F (37 1 °C)    Physical Exam:   Physical exam is limited due to infection control measures related to current COVID-19 pandemic  General:  No acute distress, chronically ill-appearing and debilitated, nontoxic  HEENT:  Atraumatic normocephalic  Pulmonary:  Normal respiratory excursion without accessory muscle use  Abdomen:  Soft, nontender  Davis catheter in place  Extremities:  No edema  Skin:  No diffuse rashes    Lab Results:  I have personally reviewed pertinent labs    Results from last 7 days   Lab Units 21  0536 21  0711 21  0623   POTASSIUM mmol/L 3 5 4 2 4 4   CHLORIDE mmol/L 115* 117* 109*   CO2 mmol/L 25 26 26   BUN mg/dL 35* 50* 54*   CREATININE mg/dL 1 07 1 31* 1 72*   EGFR ml/min/1 73sq m 51 40 29   CALCIUM mg/dL 9 2 9 1 9 1   AST U/L 34 31 50*   ALT U/L 29 29 32   ALK PHOS U/L 97 91 96     Results from last 7 days   Lab Units 21  0535 21  0711 21  0623   WBC Thousand/uL 5  29 3 81* 3 77*   HEMOGLOBIN g/dL 12 2 11 4* 12 2   PLATELETS Thousands/uL 164 148* 108*     Results from last 7 days   Lab Units 12/30/20  0457 12/29/20  0440 12/29/20  0215   BLOOD CULTURE   --   --  No Growth After 5 Days  Citrobacter freundii*   GRAM STAIN RESULT   --   --  Gram negative rods*   URINE CULTURE  >100,000 cfu/ml Enterococcus faecalis*  20,000-29,000 cfu/ml Citrobacter freundii*  <10,000 cfu/ml Candida sp  presumptively albicans*  --   --    MRSA CULTURE ONLY   --  No Methicillin Resistant Staphlyococcus aureus (MRSA) isolated  --        Imaging Studies:   I have personally reviewed pertinent imaging study reports and images in PACS  EKG, Pathology, and Other Studies:   I have personally reviewed pertinent reports

## 2021-01-04 NOTE — DISCHARGE INSTRUCTIONS
COVID-19:  Transition to prednisone 20 mg daily for 4 more days  Continue supplemental oxygen at 2 liters/minute    Urinary tract infection with gram-negative bacteremia:  Completed a 7 day course of IV cefepime per Infectious Disease  No indication for further antibiotics    Hypothyroidism:  TSH elevated    Recommend continuing Synthroid 1st thing in the morning on empty stomach and repeating a a TSH in 6 weeks    Stable for discharge back to [facility] on hospice care

## 2021-01-04 NOTE — TRANSPORTATION MEDICAL NECESSITY
Section I - General Information    Name of Patient: Faraz Macdonald                 : 1945    Medicare #: [id number]  Transport Date: 21 (PCS is valid for round trips on this date and for all repetitive trips in the 60-day range as noted below )  Origin: [address] [facility] [address]:                 [address],  [address], [place], [place] , [facility]  Is the pt's stay covered under Medicare Part A (PPS/DRG)   [x]     Closest appropriate facility? If no, why is transport to more distant facility required? Yes  If hospice pt, is this transport related to pt's terminal illness? No       Section II - Medical Necessity Questionnaire  Ambulance transportation is medically necessary only if other means of transport are contraindicated or would be potentially harmful to the patient  To meet this requirement, the patient must either be "bed confined" or suffer from a condition such that transport by means other than ambulance is contraindicated by the patient's condition  The following questions must be answered by the medical professional signing below for this form to be valid:    1)  Describe the MEDICAL CONDITION (physical and/or mental) of this patient AT [address] [place] [address] that requires the patient to be transported in an ambulance and why transport by other means is contraindicated by the patient's condition:  Pt admitted after a fall, she was treated for bacteremia, UTI, and COVID, she is confused, bedbound, nonambulatory, pt has a chronic Davis, pt is on oxygen 1-2 L nasal cannula, and pt is a fall risk  2) Is the patient "bed confined" as defined below? Yes  To be "be confined" the patient must satisfy all three of the following conditions: (1) unable to get up from bed without Assistance; AND (2) unable to ambulate; AND (3) unable to sit in a chair or wheelchair      3) Can this patient safely be transported by car or wheelchair van (i e , seated during transport without a medical attendant or monitoring)? No    4) In addition to completing questions 1-3 above, please check any of the following conditions that apply*:   *Note: supporting documentation for any boxes checked must be maintained in the patient's medical records  If hosp-hosp transfer, describe services needed at 2nd facility not available at 1st facility? Patient is confused  Requires oxygen-unable to self administer  Special handling/isolation/infection control precautions required       Section III - Signature of Physician or Healthcare Professional  I certify that the above information is true and correct based on my evaluation of this patient, and represent that the patient requires transport by ambulance and that other forms of transport are contraindicated  I understand that this information will be used by the Centers for Medicare and Medicaid Services (CMS) to support the determination of medical necessity for ambulance services, and I represent that I have personal knowledge of the patient's condition at time of transport  [x]  If this box is checked, I also certify that the patient is physically or mentally incapable of signing the ambulance service's claim and that the institution with which I am affiliated has furnished care, services, or assistance to the patient  My signature below is made on behalf of the patient pursuant to 42 CFR §424 36(b)(4)   In accordance with 42 CFR §424 37, the specific reason(s) that the patient is physically or mentally incapable of signing the claim form is as follows:      Signature of Physician* or Healthcare Professional______________________________________________________________  Signature Date 01/04/21 (For scheduled repetitive transports, this form is not valid for transports performed more than 60 days after this date)    Printed Name & Credentials of Physician or Healthcare Professional (MD, DO, RN, etc )______ Angélica Mccain __________________________  *Form must be signed by patient's attending physician for scheduled, repetitive transports   For non-repetitive, unscheduled ambulance transports, if unable to obtain the signature of the attending physician, any of the following may sign (choose appropriate option below)  [] Physician Assistant []  Clinical Nurse Specialist [x]  Registered Nurse  []  Nurse Practitioner  [x] Discharge Planner

## 2021-01-04 NOTE — DISCHARGE SUMMARY
Discharge- Haylee Vila [birth year], 77 y o  female MRN: [id number]    Unit/Bed#: [address] [address]- Encounter: [id number]    Primary Care Provider: Cherylene Hamilton, DO   Date and time admitted to hospital: 12/29/2020  4:00 PM        Sepsis [facility])  Assessment & Plan  As evidenced by fever, leukopenia and tachypnea prior to admission  Likely secondary to COVID-19 infection and gram-negative bacteremia  · Completed 7 days of IV cefepime  · Received 4 days of IV Remdesivir   · Received IV Decadron per COVID protocol  · Now afebrile clinically improving    Gram-negative bacteremia  Assessment & Plan  Patient was noted to have 1/2 blood culture positive for Citrobacter  Secondary to urinary source, complicated with chronic indwelling baca catheter  · Culture sensitivities noted  · Received 7 days of IV Cefepime on 1/4/2021  · Infectious disease evaluation appreciated    Urinary tract infection associated with catheterization of urinary tract ([facility] [address] )  Assessment & Plan  · Urine culture with growth of Citrobacter and Enterococcus faecalis  · Infectious disease following, input appreciated  · Completed 7 days of IV cefepime    Acute on chronic respiratory failure with hypoxia [facility])  Assessment & Plan  Patient is on chronic supplemental oxygen about 2 L  Noted to be hypoxic to mid 80s on 2 L of supplemental oxygen  · Now improved, currently saturating 92 % on room air   · Continue treatment as above  · Incentive spirometry, activity as tolerated    * COVID-19 virus infection  Assessment & Plan  Recently diagnosed COVID-19 on 12/24  Presented to ED with fever and hypoxia saturating in mid 80s in 2 L of chronic supplemental oxygen  Mild disease based on oxygen 3-5 L requirement on presentation, patient uses 2 L of supplemental oxygen at baseline  CXR - without any acute abnormality  SARS-CoV-2 PCR positive-at outside facility on 12/24, retested 12/29  · Oxygenation remains stable saturating adequately on 0-2 L of supplemental oxygen  · Patient does not demonstrate significant pulmonary symptoms at present but remains at high risk of severe COVID-19 infection due to age and comorbidities    · Medication treatment started per COVID protocol:  · Dexamethasone 6 mg IV daily   · Famotidine daily  · Transition to Prednisone 20 mg po daily for 4 more days   · Received 4 days of IV Remdesivir, will stop at this time due to improvement in symptoms   · Trend inflammatory markers  · Daily CBC, CMP  · Procalcitonin negative x2  · Encourage proing if tolerated  · Increase activity and mobilization as tolerated, limited at baseline    Results from last 7 days   Lab Units 12/29/20  0932   SARS-COV-2  Positive*     Results from last 7 days   Lab Units 01/03/21  0711 01/01/21  0623 12/31/20  0528 12/30/20  0454 12/29/20  0215 12/28/20  2150   CRP mg/L 58 5* 82 0* 81 4* 38 4* 55 1*  --    FERRITIN ng/mL 1,201* 1,666* 1,499* 1,165* 1,171*  --    D-DIMER QUANTITATIVE ug/ml FEU 0 54* 0 64* 0 69* 0 69*  --  1 02*      Results from last 7 days   Lab Units 12/30/20  0454 12/29/20  0215   PROCALCITONIN ng/ml 0 10 0 08      Results from last 7 days   Lab Units 12/29/20  0443 12/29/20  0215 12/28/20  2150   TROPONIN I ng/mL 0 06* 0 06* 0 05*       Hypothyroidism  Assessment & Plan  · Resumed Synthroid  · Follow-up thyroid profile as outpatient 4-6 weeks    Coronary artery disease involving native coronary artery of native heart without angina pectoris  Assessment & Plan  Continue aspirin, metoprolol, statin    Essential hypertension  Assessment & Plan  Stable  Continue metoprolol    Chronic indwelling Baca catheter  Assessment & Plan  Patient with chronic indwelling baca catheter  Exchanged catheter 1/1/2021    Atrial fibrillation ([address] )  Assessment & Plan  Rate controlled  Continue Metoprolol, Digoxin, Eliquis  Follow-up digoxin level - 0 4    Ambulatory dysfunction  Assessment & Plan  PT/OT - will be discharged back to living facility with 24/7 hospice care Hyperlipidemia  Assessment & Plan  · Continue statin    Chronic diastolic heart failure (HCC)  Assessment & Plan  Wt Readings from Last 3 Encounters:   01/03/21 80 3 kg (177 lb)   12/28/20 79 4 kg (175 lb 0 7 oz)   06/01/20 81 9 kg (180 lb 8 9 oz)     Prior echocardiogram with EF 45%  Currently appears dry   Continue metoprolol and digoxin   Held Lasix due to poor po intake    COPD (chronic obstructive pulmonary disease) (HCC)  Assessment & Plan  No evidence of exacerbation  Chest x-ray unremarkable  Continue bronchodilators, Breo  Supplemental oxygen  Monitor respiratory status    Elevated troponin  Assessment & Plan  Likely non MI troponin elevation in setting acute infection, hypoxia and renal insufficiency  Trend remains flat  Patient denies any chest pain  Continue medical management      Discharging Physician / Practitioner: TANVI Oliveros  PCP: Sharmin Olivo DO  Admission Date:   Admission Orders (From admission, onward)     Ordered        12/29/20 1717  Inpatient Admission  Once                   Discharge Date: 01/04/21    Resolved Problems  Date Reviewed: 1/4/2021    None          Consultations During Hospital Stay:  · Infectious Disease    Procedures Performed:   · CXR:  No acute cardiopulmonary disease  Significant Findings / Test Results:   · Citrobacter bacteremia and urinary tract infection in the setting of neurogenic bladder with chronic indwelling Davis catheter    Incidental Findings:   · As above    Test Results Pending at Discharge (will require follow up): · None     Outpatient Tests Requested:  · None    Complications:  None    Reason for Admission:  Sepsis secondary to gram-negative bacteremia and COVID-19 infection    Hospital Course:      Junie Paulson is a 77 y o  female patient with multiple comorbidities including CAD status post stent, diastolic CHF, COPD on 2 L of supplemental oxygen,  AFib, hypertension, dyslipidemia, hypothyroidism CKD, history of brain aneurysm, seizure disorder, dementia, GERD, peripheral artery disease, chronic indwelling catheterwho originally presented to the hospital on 12/29/2020 due to fever, lethargy, and hypoxia  Patient presented to [facility] Emergency Department for initial evaluation  In ED she was noted to have oxygen saturation in the mid 80s at her baseline 2 L of supplemental oxygen  She was subsequently admitted but transferred to [facility] due to bed availability  After the transfer, patient was noted to be afebrile, hemodynamically stable satting in high 90s on 5 L of supplemental oxygen  One of 2 blood cultures noted to be positive for gram-negative rods from admission blood cultures  Patient was evaluated by Infectious Disease  Per Infectious Disease, citrobacter bacteremia likely from urinary tract infection in the setting of neurogenic bladder with chronic indwelling baca catheter  Patient's Baca catheter was exchanged on 1/1/2021  Patient was treated with 7 days of IV cefepime  At this time, patient requires no further antibiotics  Additionally, patient was noted to have COVID-19 by positive PCR on 12/24 at nursing facility  COVID-19 infection may be contributing to her initial sepsis  Chest x-ray was negative  Patient was at high risk for severe COVID-19 infection giving advanced age and medical comorbidities, therefore, she was treated with Remdesivir and Decadron per protocol  On day of discharge, O2 requirements remained stable, currently on room air  Patient will transition to prednisone 20 mg daily for 4 more days to complete a 10 day course of therapy  Please see above list of diagnoses and related plan for additional information  Condition at Discharge: stable     Discharge Day Visit / Exam:     Subjective:  Patient seen and examined  Resting comfortably in bed  Appears more alert and awake today  Eating about 15-25% of breakfast   Responding appropriately    Smiling and giving me the thumbs up  Per primary RN, no issues overnight  Vitals: Blood Pressure: 169/80 (01/03/21 1500)  Pulse: 77 (01/03/21 1500)  Temperature: 98 8 °F (37 1 °C) (01/03/21 1500)  Temp Source: Axillary (01/03/21 1500)  Respirations: 18 (01/03/21 1500)  Height: 5' 2" (157 5 cm)(based on records) (12/30/20 1440)  Weight - Scale: 80 3 kg (177 lb) (01/03/21 0600)  SpO2: 97 % (01/03/21 1500)  Exam:   Physical Exam  Vitals signs and nursing note reviewed  Constitutional:       General: She is not in acute distress  Appearance: She is well-developed  She is obese  She is ill-appearing  She is not diaphoretic  Comments: Pleasantly confused female resting comfortably in bed on room air satting 93%   HENT:      Head: Normocephalic and atraumatic  Eyes:      Conjunctiva/sclera: Conjunctivae normal    Neck:      Musculoskeletal: Neck supple  Cardiovascular:      Rate and Rhythm: Normal rate and regular rhythm  Heart sounds: No murmur  Pulmonary:      Effort: Pulmonary effort is normal  No respiratory distress  Breath sounds: Normal breath sounds  No wheezing, rhonchi or rales  Abdominal:      General: Bowel sounds are normal  There is no distension  Palpations: Abdomen is soft  Tenderness: There is no abdominal tenderness  There is no guarding  Musculoskeletal: Normal range of motion  Right lower leg: No edema  Left lower leg: No edema  Skin:     General: Skin is warm and dry  Capillary Refill: Capillary refill takes less than 2 seconds  Comments: Bilateral upper extremities with scattered bruises,  Left hand with soft mitt in place   Neurological:      Mental Status: She is alert  Mental status is at baseline  She is disoriented  Psychiatric:         Mood and Affect: Mood normal          Speech: She is noncommunicative  Behavior: Behavior is cooperative  Cognition and Memory: Cognition is impaired  Memory is impaired           Judgment: Judgment is inappropriate  Discussion with Family:  Daughter via telephone    Discharge instructions/Information to patient and family:   See after visit summary for information provided to patient and family  Provisions for Follow-Up Care:  See after visit summary for information related to follow-up care and any pertinent home health orders  Disposition:     Other: [facility] facility    For Discharges to [facility] SNF:   · Not Applicable to this Patient - Not Applicable to this Patient    Planned Readmission:  None     Discharge Statement:  I spent > 30 minutes discharging the patient  This time was spent on the day of discharge  I had direct contact with the patient on the day of discharge  Greater than 50% of the total time was spent examining patient, answering all patient questions, arranging and discussing plan of care with patient as well as directly providing post-discharge instructions  Additional time then spent on discharge activities  Discharge Medications:  See after visit summary for reconciled discharge medications provided to patient and family        ** Please Note: This note has been constructed using a voice recognition system **

## 2021-01-04 NOTE — PLAN OF CARE
Problem: Potential for Falls  Goal: Patient will remain free of falls  Description: INTERVENTIONS:  - Assess patient frequently for physical needs  -  Identify cognitive and physical deficits and behaviors that affect risk of falls  -  [place] fall precautions as indicated by assessment   - Educate patient/family on patient safety including physical limitations  - Instruct patient to call for assistance with activity based on assessment  - Modify environment to reduce risk of injury  - Consider OT/PT consult to assist with strengthening/mobility  Outcome: Progressing     Problem: NEUROSENSORY - ADULT  Goal: Achieves stable or improved neurological status  Description: INTERVENTIONS  - Monitor and report changes in neurological status  - Monitor vital signs such as temperature, blood pressure, glucose, and any other labs ordered   - Initiate measures to prevent increased intracranial pressure  - Monitor for seizure activity and implement precautions if appropriate      Outcome: Progressing  Goal: Remains free of injury related to seizures activity  Description: INTERVENTIONS  - Maintain airway, patient safety  and administer oxygen as ordered  - Monitor patient for seizure activity, document and report duration and description of seizure to physician/advanced practitioner  - If seizure occurs,  ensure patient safety during seizure  - Reorient patient post seizure  - Seizure pads on all 4 side rails  - Instruct patient/family to notify RN of any seizure activity including if an aura is experienced  - Instruct patient/family to call for assistance with activity based on nursing assessment  - Administer anti-seizure medications if ordered    Outcome: Progressing  Goal: Achieves maximal functionality and self care  Description: INTERVENTIONS  - Monitor swallowing and airway patency with patient fatigue and changes in neurological status  - Encourage and assist patient to increase activity and self care     - Encourage visually impaired, hearing impaired and aphasic patients to use assistive/communication devices  Outcome: Progressing     Problem: RESPIRATORY - ADULT  Goal: Achieves optimal ventilation and oxygenation  Description: INTERVENTIONS:  - Assess for changes in respiratory status  - Assess for changes in mentation and behavior  - Position to facilitate oxygenation and minimize respiratory effort  - Oxygen administered by appropriate delivery if ordered  - Initiate smoking cessation education as indicated  - Encourage broncho-pulmonary hygiene including cough, deep breathe, Incentive Spirometry  - Assess the need for suctioning and aspirate as needed  - Assess and instruct to report SOB or any respiratory difficulty  - Respiratory Therapy support as indicated  Outcome: Progressing     Problem: METABOLIC, FLUID AND ELECTROLYTES - ADULT  Goal: Electrolytes maintained within normal limits  Description: INTERVENTIONS:  - Monitor labs and assess patient for signs and symptoms of electrolyte imbalances  - Administer electrolyte replacement as ordered  - Monitor response to electrolyte replacements, including repeat lab results as appropriate  - Instruct patient on fluid and nutrition as appropriate  Outcome: Progressing  Goal: Fluid balance maintained  Description: INTERVENTIONS:  - Monitor labs   - Monitor I/O and WT  - Instruct patient on fluid and nutrition as appropriate  - Assess for signs & symptoms of volume excess or deficit  Outcome: Progressing  Goal: Glucose maintained within target range  Description: INTERVENTIONS:  - Monitor Blood Glucose as ordered  - Assess for signs and symptoms of hyperglycemia and hypoglycemia  - Administer ordered medications to maintain glucose within target range  - Assess nutritional intake and initiate nutrition service referral as needed  Outcome: Progressing     Problem: SKIN/TISSUE INTEGRITY - ADULT  Goal: Skin integrity remains intact  Description: INTERVENTIONS  - Identify patients at risk for skin breakdown  - Assess and monitor skin integrity  - Assess and monitor nutrition and hydration status  - Monitor labs (i e  albumin)  - Assess for incontinence   - Turn and reposition patient  - Assist with mobility/ambulation  - Relieve pressure over bony prominences  - Avoid friction and shearing  - Provide appropriate hygiene as needed including keeping skin clean and dry  - Evaluate need for skin moisturizer/barrier cream  - Collaborate with interdisciplinary team (i e  Nutrition, Rehabilitation, etc )   - Patient/family teaching  Outcome: Progressing  Goal: Incision(s), wounds(s) or drain site(s) healing without S/S of infection  Description: INTERVENTIONS  - Assess and document risk factors for skin impairment   - Assess and document dressing, incision, wound bed, drain sites and surrounding tissue  - Consider nutrition services referral as needed  - Oral mucous membranes remain intact  - Provide patient/ family education  Outcome: Progressing  Goal: Oral mucous membranes remain intact  Description: INTERVENTIONS  - Assess oral mucosa and hygiene practices  - Implement preventative oral hygiene regimen  - Implement oral medicated treatments as ordered  - Initiate Nutrition services referral as needed  Outcome: Progressing     Problem: MUSCULOSKELETAL - ADULT  Goal: Maintain or return mobility to safest level of function  Description: INTERVENTIONS:  - Assess patient's ability to carry out ADLs; assess patient's baseline for ADL function and identify physical deficits which impact ability to perform ADLs (bathing, care of mouth/teeth, toileting, grooming, dressing, etc )  - Assess/evaluate cause of self-care deficits   - Assess range of motion  - Assess patient's mobility  - Assess patient's need for assistive devices and provide as appropriate  - Encourage maximum independence but intervene and supervise when necessary  - Involve family in performance of ADLs  - Assess for home care needs following discharge   - Consider OT consult to assist with ADL evaluation and planning for discharge  - Provide patient education as appropriate  Outcome: Progressing  Goal: Maintain proper alignment of affected body part  Description: INTERVENTIONS:  - Support, maintain and protect limb and body alignment  - Provide patient/ family with appropriate education  Outcome: Progressing     Problem: Prexisting or High Potential for Compromised Skin Integrity  Goal: Skin integrity is maintained or improved  Description: INTERVENTIONS:  - Identify patients at risk for skin breakdown  - Assess and monitor skin integrity  - Assess and monitor nutrition and hydration status  - Monitor labs   - Assess for incontinence   - Turn and reposition patient  - Assist with mobility/ambulation  - Relieve pressure over bony prominences  - Avoid friction and shearing  - Provide appropriate hygiene as needed including keeping skin clean and dry  - Evaluate need for skin moisturizer/barrier cream  - Collaborate with interdisciplinary team   - Patient/family teaching  - Consider wound care consult   Outcome: Progressing     Problem: Nutrition/Hydration-ADULT  Goal: Nutrient/Hydration intake appropriate for improving, restoring or maintaining nutritional needs  Description: Monitor and assess patient's nutrition/hydration status for malnutrition  Collaborate with interdisciplinary team and initiate plan and interventions as ordered  Monitor patient's weight and dietary intake as ordered or per policy  Utilize nutrition screening tool and intervene as necessary  Determine patient's food preferences and provide high-protein, high-caloric foods as appropriate       INTERVENTIONS:  - Monitor oral intake, urinary output, labs, and treatment plans  - Assess nutrition and hydration status and recommend course of action  - Evaluate amount of meals eaten  - Assist patient with eating if necessary   - Allow adequate time for meals  - Recommend/ encourage appropriate diets, oral nutritional supplements, and vitamin/mineral supplements  - Assess need for intravenous fluids  - Provide specific nutrition/hydration education as appropriate  - Include patient/family/caregiver in decisions related to nutrition  Outcome: Progressing

## 2021-01-05 NOTE — CASE MANAGEMENT
RYAN NEWELL  Informed CM pt is cleared for discharge today back to [facility]  where she resides  SHABANA left Jesse Gibson [facility] [place]  and patient's daughter Andrea Watt voicemail w/same including CM's contact information  CM contacted [facility] to arrange transport, awaiting call back from [facility]

## 2021-01-05 NOTE — CASE MANAGEMENT
CM received call from Colten lugo/JANEE stating a 7:00 p m  [facility] has been arranged, that is the earliest available transport  CM informed CNP Jeny NEWELL  And TARIQ [facility] of same  CM sent Boaz Olivarez [facility] message in [employer] informing them of same

## 2021-01-05 NOTE — CASE MANAGEMENT
CM received callback from [facility] JONO  reporting she spoke with patient's daughter, and [facility] w/[facility] already regarding pt returning to their facility  CM informed Parveen Olson that TARIQ Brito tried calling with RN report but there was no answer  Greta requested Jesse Brito call her at May number and she will take report from her  CM informed RN choice of same  CM provided TARIQ Brito w/completed CMN and completed facility section of epic  RYAN Partida  informed CM patient's daughter Malissa Gaines was still at work, she spoke with her and she is aware pt will be discharging tonight back to her [facility]

## 2021-01-05 NOTE — NURSING NOTE
Patient discharged via transport team to [facility]  Belongings given to EMS

## 2021-01-05 NOTE — CASE MANAGEMENT
CM received call from Jelena Dupree (Ph: [phone]) with [facility] office,  Reporting PTA they were providing hospice services to pt and will resume hospice services once pt is discharged back to [facility] and patient's daughter Shirley's request   Jelena Dupree requested CM send them a referral in Allscripts and include patient's progress note and discharge summary which CM completed

## 2021-01-06 ENCOUNTER — PATIENT OUTREACH (OUTPATIENT)
Dept: CASE MANAGEMENT | Facility: OTHER | Age: 76
End: 2021-01-06

## 2021-01-06 NOTE — PROGRESS NOTES
Outreach TC to patient's [facility] for CM assessment  No answer to call  Left message on voicemail in nursing office, requesting a return call from nursing office to Markus Lal RN, BSN; Outpatient Care Manager @ [phone]  First unanswered call to patient's facility

## 2021-01-11 ENCOUNTER — PATIENT OUTREACH (OUTPATIENT)
Dept: CASE MANAGEMENT | Facility: OTHER | Age: 76
End: 2021-01-11

## 2021-01-11 NOTE — PROGRESS NOTES
Outreach TC to patient's facility for CM assessment  No answer to call  Left message on voicemail of Norbert Bledsoe in the nursing office requesting a return call from facility to  Karon Cuevas 12, BSN; [address] @ [phone]  Second unanswered call to patient

## 2021-01-15 ENCOUNTER — PATIENT OUTREACH (OUTPATIENT)
Dept: CASE MANAGEMENT | Facility: OTHER | Age: 76
End: 2021-01-15

## 2021-01-15 NOTE — PROGRESS NOTES
Outreach TC to patient's facility for CM assessment  No answer to call  Left message on voicemail in nursing office for Tony Saravia, requesting a return call from patient to Claire Mathew RN, BSN; Outpatient Care Manager @ [phone]  Third unanswered call to patient's facility

## 2021-01-21 ENCOUNTER — PATIENT OUTREACH (OUTPATIENT)
Dept: CASE MANAGEMENT | Facility: OTHER | Age: 76
End: 2021-01-21

## 2021-01-21 NOTE — PROGRESS NOTES
Outreach TC to patient' [facility] for CM assessment  No answer to call  Left message on voicemail in nursing office requesting a return call from staff to Gokul Jones RN, BSN; Outpatient Care Manager @ [phone]  Second unanswered call to patient's facility

## 2021-01-27 ENCOUNTER — PATIENT OUTREACH (OUTPATIENT)
Dept: CASE MANAGEMENT | Facility: OTHER | Age: 76
End: 2021-01-27

## 2021-01-27 NOTE — PROGRESS NOTES
Outreach TC to [facility] are home for patient update  Patient remains under the care of hospice  Most days patient remains bedbound  Patient is confused  Patient is sstill able to take in po food and fluids though appetite is poor  Patient needs max assist for all ADL's  Facility understands need for BPCI updates and agrees to additional calls

## 2021-02-09 ENCOUNTER — PATIENT OUTREACH (OUTPATIENT)
Dept: CASE MANAGEMENT | Facility: OTHER | Age: 76
End: 2021-02-09

## 2021-02-09 NOTE — PROGRESS NOTES
Outreach TC to patient for CM assessment  No answer to call  Left message on voicemail requesting a return call from patient to Karon Cuevas 12, BSN; Outpatient Care Manager @ [phone]  Second call to patient

## 2021-02-18 ENCOUNTER — PATIENT OUTREACH (OUTPATIENT)
Dept: CASE MANAGEMENT | Facility: OTHER | Age: 76
End: 2021-02-18

## 2021-02-18 NOTE — PROGRESS NOTES
Outreach TC to patient for CM assessment  No answer to call  Left message on voicemail requesting a return call from patient to Karon Cuevas 12, BSN; Outpatient Care Manager @ [phone]  Multiple calls to patient's facility

## 2021-02-18 NOTE — PROGRESS NOTES
Incoming call from [facility]  This OPCM missed call  Will return call to [facility]

## 2021-02-18 NOTE — PROGRESS NOTES
Outreach TC to patient's facility, [facility], for CM assessment  No answer to call  Left message on voicemail in nursing department requesting a return call from nursing staff to Markus Lal RN, BSN; Outpatient Care Manager @ [phone]  Fifth unanswered call to patient's facility

## 2021-02-25 ENCOUNTER — PATIENT OUTREACH (OUTPATIENT)
Dept: CASE MANAGEMENT | Facility: OTHER | Age: 76
End: 2021-02-25

## 2021-02-25 NOTE — PROGRESS NOTES
Return TC from [facility] spoke with Nani  Patient remains under her Medicare Hospice Benefit and has home hospice services provided by  [facility]  Patient is max A of 1 to transfer to wheelchair but cannot propel herself in the W/  Patient is alert and oriented to self only  Patient is able to take in food and fluids via oral route but needs assist for cuing during meals  Patient is max A of 1 for all UB and LB bathing and dressing  Patient is mostly continent of bowel and has an indwelling baca catheter

## 2021-02-25 NOTE — PROGRESS NOTES
Outreach TC to patient's facility [facility], for CM assessment  No answer to call  Left message on voicemail in nursing office Tracy Leone) requesting a return call from facility to Karon Cuevas 12, BSN; Outpatient Care Manager @ [phone]   Sixth unanswered call to patient's facility

## 2021-03-11 ENCOUNTER — PATIENT OUTREACH (OUTPATIENT)
Dept: CASE MANAGEMENT | Facility: OTHER | Age: 76
End: 2021-03-11

## 2021-03-11 NOTE — PROGRESS NOTES
Outreach TC to [facility] for BPCI update  Spoke with Nani for update  Patient remains unchanged  Patient is still being seen by [facility] for hospice services  Patient is able to trnafer to chair with max A of 1  Patient remains alert and oriented to self only  Patient is able to take food and fluids by mouth with supervision for cuing  Davis catheter remains in place with no s/sx of infection  Patient is max A of 1 for all ADL's  Facility is in agreement for further calls for updates

## 2021-04-05 ENCOUNTER — PATIENT OUTREACH (OUTPATIENT)
Dept: CASE MANAGEMENT | Facility: OTHER | Age: 76
End: 2021-04-05

## 2021-04-05 NOTE — PROGRESS NOTES
Outreach TC to patient's [facility] for final BPCI update  Patient remains at [facility] under her [employer] hospice benefit  Patient is alert but confused  Patient is not ambulatory and needs max A to complete ADL's   Patient has no other skilled services  BPCI episode end  Resolved episode, removed self from care team and updated care coordination note

## 2021-06-01 ENCOUNTER — HOSPITAL ENCOUNTER (EMERGENCY)
Facility: HOSPITAL | Age: 76
Discharge: HOME | End: 2021-06-01
Attending: EMERGENCY MEDICINE | Admitting: EMERGENCY MEDICINE
Payer: MEDICARE

## 2021-06-01 ENCOUNTER — APPOINTMENT (EMERGENCY)
Dept: CT IMAGING | Facility: HOSPITAL | Age: 76
End: 2021-06-01
Payer: MEDICARE

## 2021-06-01 VITALS
HEART RATE: 53 BPM | WEIGHT: 172.84 LBS | RESPIRATION RATE: 18 BRPM | OXYGEN SATURATION: 95 % | BODY MASS INDEX: 31.61 KG/M2 | DIASTOLIC BLOOD PRESSURE: 78 MMHG | TEMPERATURE: 96.5 F | SYSTOLIC BLOOD PRESSURE: 182 MMHG
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
## 2021-06-01 DIAGNOSIS — T14.8XXA ABRASION: ICD-10-CM

## 2021-06-01 DIAGNOSIS — M54.6 THORACIC BACK PAIN: Primary | ICD-10-CM

## 2021-06-01 PROCEDURE — 74176 CT ABD & PELVIS W/O CONTRAST: CPT

## 2021-06-01 PROCEDURE — 99284 EMERGENCY DEPT VISIT MOD MDM: CPT | Performed by: EMERGENCY MEDICINE

## 2021-06-01 PROCEDURE — 99285 EMERGENCY DEPT VISIT HI MDM: CPT

## 2021-06-01 PROCEDURE — 71250 CT THORAX DX C-: CPT

## 2021-06-01 NOTE — ED NOTES
Nurse spoke to [address] from [place] and made her aware on patient's status and p/u          Della Cook RN  06/01/21 1937

## 2021-06-01 NOTE — ED NOTES
Alonso called for EMS  back to tez, Alonso called back p/u at [facility], [address] [place] [address]  06/01/21 1935

## 2021-06-02 NOTE — ED NOTES
Patient left out of department stable      [facility]  06/01/21 5806

## 2021-06-18 ENCOUNTER — HOSPITAL ENCOUNTER (INPATIENT)
Facility: HOSPITAL | Age: 76
LOS: 6 days | Discharge: NURSING, REHAB OR LONG-TERM CARE | DRG: 662 | End: 2021-06-25
Attending: GENERAL PRACTICE | Admitting: INTERNAL MEDICINE
Payer: MEDICARE

## 2021-06-18 ENCOUNTER — APPOINTMENT (EMERGENCY)
Dept: CT IMAGING | Facility: HOSPITAL | Age: 76
End: 2021-06-18
Payer: MEDICARE

## 2021-06-18 ENCOUNTER — HOSPITAL ENCOUNTER (EMERGENCY)
Facility: HOSPITAL | Age: 76
End: 2021-06-18
Attending: EMERGENCY MEDICINE | Admitting: EMERGENCY MEDICINE
Payer: MEDICARE

## 2021-06-18 VITALS
OXYGEN SATURATION: 98 % | RESPIRATION RATE: 21 BRPM | HEART RATE: 61 BPM | DIASTOLIC BLOOD PRESSURE: 74 MMHG | BODY MASS INDEX: 32.86 KG/M2 | WEIGHT: 179.68 LBS | TEMPERATURE: 98 F | SYSTOLIC BLOOD PRESSURE: 161 MMHG

## 2021-06-18 DIAGNOSIS — F02.81 DEMENTIA ASSOCIATED WITH OTHER UNDERLYING DISEASE WITH BEHAVIORAL DISTURBANCE (HCC): ICD-10-CM

## 2021-06-18 DIAGNOSIS — E87.0 HYPERNATREMIA: ICD-10-CM

## 2021-06-18 DIAGNOSIS — R31.0 GROSS HEMATURIA: ICD-10-CM

## 2021-06-18 DIAGNOSIS — N30.91 HEMORRHAGIC CYSTITIS: ICD-10-CM

## 2021-06-18 DIAGNOSIS — C68.9 UROTHELIAL CARCINOMA (HCC): ICD-10-CM

## 2021-06-18 DIAGNOSIS — F06.30 MOOD DISORDER AS LATE EFFECT OF CEREBROVASCULAR ACCIDENT (CVA): ICD-10-CM

## 2021-06-18 DIAGNOSIS — C67.2 MALIGNANT NEOPLASM OF LATERAL WALL OF URINARY BLADDER (HCC): Primary | ICD-10-CM

## 2021-06-18 DIAGNOSIS — I69.398 MOOD DISORDER AS LATE EFFECT OF CEREBROVASCULAR ACCIDENT (CVA): ICD-10-CM

## 2021-06-18 DIAGNOSIS — E03.9 HYPOTHYROIDISM, UNSPECIFIED TYPE: ICD-10-CM

## 2021-06-18 DIAGNOSIS — R31.0 GROSS HEMATURIA: Primary | ICD-10-CM

## 2021-06-18 LAB
ALBUMIN SERPL BCP-MCNC: 3.2 G/DL (ref 3.5–5)
ALP SERPL-CCNC: 85 U/L (ref 46–116)
ALT SERPL W P-5'-P-CCNC: 25 U/L (ref 12–78)
ANION GAP SERPL CALCULATED.3IONS-SCNC: 6 MMOL/L (ref 4–13)
APTT PPP: 30 SECONDS (ref 23–37)
AST SERPL W P-5'-P-CCNC: 18 U/L (ref 5–45)
BACTERIA UR QL AUTO: ABNORMAL /HPF
BASOPHILS # BLD AUTO: 0.04 THOUSANDS/ΜL (ref 0–0.1)
BASOPHILS NFR BLD AUTO: 1 % (ref 0–1)
BILIRUB SERPL-MCNC: 0.35 MG/DL (ref 0.2–1)
BILIRUB UR QL STRIP: NEGATIVE
BUN SERPL-MCNC: 32 MG/DL (ref 5–25)
CALCIUM ALBUM COR SERPL-MCNC: 10 MG/DL (ref 8.3–10.1)
CALCIUM SERPL-MCNC: 9.4 MG/DL (ref 8.3–10.1)
CHLORIDE SERPL-SCNC: 106 MMOL/L (ref 100–108)
CLARITY UR: ABNORMAL
CO2 SERPL-SCNC: 35 MMOL/L (ref 21–32)
COLOR UR: ABNORMAL
CREAT SERPL-MCNC: 1.56 MG/DL (ref 0.6–1.3)
EOSINOPHIL # BLD AUTO: 0.23 THOUSAND/ΜL (ref 0–0.61)
EOSINOPHIL NFR BLD AUTO: 4 % (ref 0–6)
ERYTHROCYTE [DISTWIDTH] IN BLOOD BY AUTOMATED COUNT: 13 % (ref 11.6–15.1)
GFR SERPL CREATININE-BSD FRML MDRD: 32 ML/MIN/1.73SQ M
GLUCOSE SERPL-MCNC: 181 MG/DL (ref 65–140)
GLUCOSE UR STRIP-MCNC: NEGATIVE MG/DL
HCT VFR BLD AUTO: 38.5 % (ref 34.8–46.1)
HGB BLD-MCNC: 12.5 G/DL (ref 11.5–15.4)
HGB UR QL STRIP.AUTO: ABNORMAL
IMM GRANULOCYTES # BLD AUTO: 0.06 THOUSAND/UL (ref 0–0.2)
IMM GRANULOCYTES NFR BLD AUTO: 1 % (ref 0–2)
INR PPP: 1.1 (ref 0.84–1.19)
KETONES UR STRIP-MCNC: NEGATIVE MG/DL
LEUKOCYTE ESTERASE UR QL STRIP: ABNORMAL
LYMPHOCYTES # BLD AUTO: 2.24 THOUSANDS/ΜL (ref 0.6–4.47)
LYMPHOCYTES NFR BLD AUTO: 34 % (ref 14–44)
MCH RBC QN AUTO: 34.4 PG (ref 26.8–34.3)
MCHC RBC AUTO-ENTMCNC: 32.5 G/DL (ref 31.4–37.4)
MCV RBC AUTO: 106 FL (ref 82–98)
MONOCYTES # BLD AUTO: 0.54 THOUSAND/ΜL (ref 0.17–1.22)
MONOCYTES NFR BLD AUTO: 8 % (ref 4–12)
NEUTROPHILS # BLD AUTO: 3.43 THOUSANDS/ΜL (ref 1.85–7.62)
NEUTS SEG NFR BLD AUTO: 52 % (ref 43–75)
NITRITE UR QL STRIP: NEGATIVE
NON-SQ EPI CELLS URNS QL MICRO: ABNORMAL /HPF
NRBC BLD AUTO-RTO: 0 /100 WBCS
PH UR STRIP.AUTO: 6.5 [PH]
PLATELET # BLD AUTO: 188 THOUSANDS/UL (ref 149–390)
PMV BLD AUTO: 11.7 FL (ref 8.9–12.7)
POTASSIUM SERPL-SCNC: 4.4 MMOL/L (ref 3.5–5.3)
PROT SERPL-MCNC: 7.4 G/DL (ref 6.4–8.2)
PROT UR STRIP-MCNC: ABNORMAL MG/DL
PROTHROMBIN TIME: 14.4 SECONDS (ref 11.6–14.5)
RBC # BLD AUTO: 3.63 MILLION/UL (ref 3.81–5.12)
RBC #/AREA URNS AUTO: ABNORMAL /HPF
SARS-COV-2 RNA RESP QL NAA+PROBE: NEGATIVE
SODIUM SERPL-SCNC: 147 MMOL/L (ref 136–145)
SP GR UR STRIP.AUTO: 1.01 (ref 1–1.03)
UROBILINOGEN UR QL STRIP.AUTO: 0.2 E.U./DL
WBC # BLD AUTO: 6.54 THOUSAND/UL (ref 4.31–10.16)
WBC #/AREA URNS AUTO: ABNORMAL /HPF

## 2021-06-18 PROCEDURE — 99285 EMERGENCY DEPT VISIT HI MDM: CPT | Performed by: EMERGENCY MEDICINE

## 2021-06-18 PROCEDURE — 87086 URINE CULTURE/COLONY COUNT: CPT | Performed by: EMERGENCY MEDICINE

## 2021-06-18 PROCEDURE — 85025 COMPLETE CBC W/AUTO DIFF WBC: CPT | Performed by: EMERGENCY MEDICINE

## 2021-06-18 PROCEDURE — U0005 INFEC AGEN DETEC AMPLI PROBE: HCPCS | Performed by: EMERGENCY MEDICINE

## 2021-06-18 PROCEDURE — 74176 CT ABD & PELVIS W/O CONTRAST: CPT

## 2021-06-18 PROCEDURE — 36415 COLL VENOUS BLD VENIPUNCTURE: CPT | Performed by: EMERGENCY MEDICINE

## 2021-06-18 PROCEDURE — 85730 THROMBOPLASTIN TIME PARTIAL: CPT | Performed by: EMERGENCY MEDICINE

## 2021-06-18 PROCEDURE — 81001 URINALYSIS AUTO W/SCOPE: CPT | Performed by: EMERGENCY MEDICINE

## 2021-06-18 PROCEDURE — U0003 INFECTIOUS AGENT DETECTION BY NUCLEIC ACID (DNA OR RNA); SEVERE ACUTE RESPIRATORY SYNDROME CORONAVIRUS 2 (SARS-COV-2) (CORONAVIRUS DISEASE [COVID-19]), AMPLIFIED PROBE TECHNIQUE, MAKING USE OF HIGH THROUGHPUT TECHNOLOGIES AS DESCRIBED BY CMS-2020-01-R: HCPCS | Performed by: EMERGENCY MEDICINE

## 2021-06-18 PROCEDURE — 99285 EMERGENCY DEPT VISIT HI MDM: CPT

## 2021-06-18 PROCEDURE — 85610 PROTHROMBIN TIME: CPT | Performed by: EMERGENCY MEDICINE

## 2021-06-18 PROCEDURE — 80053 COMPREHEN METABOLIC PANEL: CPT | Performed by: EMERGENCY MEDICINE

## 2021-06-18 RX ORDER — CARBAMAZEPINE 100 MG/1
100 TABLET, CHEWABLE ORAL 3 TIMES DAILY
Status: DISCONTINUED | OUTPATIENT
Start: 2021-06-19 | End: 2021-06-25 | Stop reason: HOSPADM

## 2021-06-18 RX ORDER — IPRATROPIUM BROMIDE AND ALBUTEROL SULFATE 2.5; .5 MG/3ML; MG/3ML
3 SOLUTION RESPIRATORY (INHALATION) 4 TIMES DAILY
Status: DISCONTINUED | OUTPATIENT
Start: 2021-06-19 | End: 2021-06-19

## 2021-06-18 RX ORDER — POLYETHYLENE GLYCOL 3350 17 G/17G
17 POWDER, FOR SOLUTION ORAL DAILY PRN
Status: DISCONTINUED | OUTPATIENT
Start: 2021-06-18 | End: 2021-06-25 | Stop reason: HOSPADM

## 2021-06-18 RX ORDER — FENOFIBRATE 145 MG/1
145 TABLET, COATED ORAL DAILY
Status: DISCONTINUED | OUTPATIENT
Start: 2021-06-19 | End: 2021-06-25 | Stop reason: HOSPADM

## 2021-06-18 RX ORDER — ALBUTEROL SULFATE 90 UG/1
2 AEROSOL, METERED RESPIRATORY (INHALATION) EVERY 6 HOURS PRN
Status: DISCONTINUED | OUTPATIENT
Start: 2021-06-18 | End: 2021-06-25 | Stop reason: HOSPADM

## 2021-06-18 RX ORDER — FERROUS SULFATE 325(65) MG
325 TABLET ORAL 2 TIMES DAILY
Status: DISCONTINUED | OUTPATIENT
Start: 2021-06-19 | End: 2021-06-25 | Stop reason: HOSPADM

## 2021-06-18 RX ORDER — ATORVASTATIN CALCIUM 10 MG/1
10 TABLET, FILM COATED ORAL
Status: DISCONTINUED | OUTPATIENT
Start: 2021-06-19 | End: 2021-06-25 | Stop reason: HOSPADM

## 2021-06-18 RX ORDER — FUROSEMIDE 20 MG/1
20 TABLET ORAL DAILY
Status: DISCONTINUED | OUTPATIENT
Start: 2021-06-19 | End: 2021-06-22

## 2021-06-18 RX ORDER — ONDANSETRON 2 MG/ML
4 INJECTION INTRAMUSCULAR; INTRAVENOUS EVERY 6 HOURS PRN
Status: DISCONTINUED | OUTPATIENT
Start: 2021-06-18 | End: 2021-06-25 | Stop reason: HOSPADM

## 2021-06-18 RX ORDER — MELATONIN
2000 DAILY
Status: DISCONTINUED | OUTPATIENT
Start: 2021-06-19 | End: 2021-06-25 | Stop reason: HOSPADM

## 2021-06-18 RX ORDER — SENNOSIDES 8.6 MG
1 TABLET ORAL DAILY
Status: DISCONTINUED | OUTPATIENT
Start: 2021-06-19 | End: 2021-06-25 | Stop reason: HOSPADM

## 2021-06-18 RX ORDER — BUPROPION HYDROCHLORIDE 150 MG/1
150 TABLET ORAL DAILY
Refills: 0 | Status: DISCONTINUED | OUTPATIENT
Start: 2021-06-19 | End: 2021-06-25 | Stop reason: HOSPADM

## 2021-06-18 RX ORDER — LORAZEPAM 0.5 MG/1
0.5 TABLET ORAL
Status: DISCONTINUED | OUTPATIENT
Start: 2021-06-19 | End: 2021-06-25 | Stop reason: HOSPADM

## 2021-06-18 RX ORDER — DIGOXIN 125 MCG
125 TABLET ORAL DAILY
Status: DISCONTINUED | OUTPATIENT
Start: 2021-06-19 | End: 2021-06-25 | Stop reason: HOSPADM

## 2021-06-18 RX ORDER — NITROGLYCERIN 0.4 MG/1
0.4 TABLET SUBLINGUAL
Status: DISCONTINUED | OUTPATIENT
Start: 2021-06-18 | End: 2021-06-25 | Stop reason: HOSPADM

## 2021-06-18 RX ORDER — POTASSIUM CHLORIDE 20 MEQ/1
20 TABLET, EXTENDED RELEASE ORAL DAILY
Status: DISCONTINUED | OUTPATIENT
Start: 2021-06-19 | End: 2021-06-25 | Stop reason: HOSPADM

## 2021-06-18 RX ORDER — ACETAMINOPHEN 325 MG/1
650 TABLET ORAL EVERY 8 HOURS PRN
Status: DISCONTINUED | OUTPATIENT
Start: 2021-06-18 | End: 2021-06-25 | Stop reason: HOSPADM

## 2021-06-18 RX ORDER — METOPROLOL TARTRATE 50 MG/1
100 TABLET, FILM COATED ORAL EVERY 12 HOURS SCHEDULED
Status: DISCONTINUED | OUTPATIENT
Start: 2021-06-19 | End: 2021-06-25 | Stop reason: HOSPADM

## 2021-06-18 RX ORDER — FAMOTIDINE 20 MG/1
20 TABLET, FILM COATED ORAL DAILY
Status: DISCONTINUED | OUTPATIENT
Start: 2021-06-19 | End: 2021-06-25 | Stop reason: HOSPADM

## 2021-06-18 RX ORDER — DIVALPROEX SODIUM 250 MG/1
250 TABLET, DELAYED RELEASE ORAL EVERY 12 HOURS SCHEDULED
Status: DISCONTINUED | OUTPATIENT
Start: 2021-06-19 | End: 2021-06-25 | Stop reason: HOSPADM

## 2021-06-18 RX ORDER — LEVOTHYROXINE SODIUM 0.1 MG/1
100 TABLET ORAL
Status: DISCONTINUED | OUTPATIENT
Start: 2021-06-19 | End: 2021-06-21

## 2021-06-18 RX ORDER — BISACODYL 10 MG
10 SUPPOSITORY, RECTAL RECTAL DAILY PRN
Status: DISCONTINUED | OUTPATIENT
Start: 2021-06-18 | End: 2021-06-19

## 2021-06-18 RX ORDER — HYDROMORPHONE HYDROCHLORIDE 2 MG/1
2 TABLET ORAL EVERY 4 HOURS PRN
Status: DISCONTINUED | OUTPATIENT
Start: 2021-06-18 | End: 2021-06-25 | Stop reason: HOSPADM

## 2021-06-18 RX ORDER — FLUTICASONE FUROATE AND VILANTEROL 200; 25 UG/1; UG/1
1 POWDER RESPIRATORY (INHALATION)
Status: DISCONTINUED | OUTPATIENT
Start: 2021-06-19 | End: 2021-06-19

## 2021-06-18 RX ORDER — HALOPERIDOL 1 MG/1
1 TABLET ORAL EVERY 8 HOURS PRN
Status: DISCONTINUED | OUTPATIENT
Start: 2021-06-18 | End: 2021-06-25 | Stop reason: HOSPADM

## 2021-06-18 NOTE — ED NOTES
Davis irrigated,multiple  blood clots came out  CBI started with manual irrigation as needed        Donavon Mireles, TARIQ  06/18/21 [address], TARIQ  06/18/21 1821

## 2021-06-18 NOTE — ED PROVIDER NOTES
History  Chief Complaint   Patient presents with    Blood in Urine     pt presents via EMS from [place] for c/o blood in her baca for the past 2 days  Pt denies any other c/o arrival       HPI  76 yo F with dementia, afib, CAD, COPD, chronic baca catheter presents with blood in baca bag for the past two days  Patient declines any pain or any other complaints  Prior to Admission Medications   Prescriptions Last Dose Informant Patient Reported? Taking?    Acetaminophen 325 MG CAPS  Outside Facility (Specify) Yes No   Sig: Take by mouth   Cholecalciferol (VITAMIN D) 2000 units CAPS  Outside Facility (Specify) No No   Sig: Take 1 capsule (2,000 Units total) by mouth daily   Citric Mx-Zprbyannpoa-Vp Carb (RENACIDIN) SOLN  Outside Facility (Specify) Yes No   Sig: Indications: 60ml to irrigate baca once a week on Friday   Hydromorphone HCl 1 MG/ML LIQD   Yes No   Sig: Take 2 5 mg by mouth every 3 (three) hours as needed   Hyoscyamine Sulfate (HYOSCYAMINE PO)   Yes No   Sig: Take 0 125 mg by mouth every 4 (four) hours as needed (secretions)   LORazepam (ATIVAN) 0 5 mg tablet   Yes No   Si 5 mg daily at bedtime    albuterol (PROVENTIL HFA,VENTOLIN HFA) 90 mcg/act inhaler  Outside Facility (Specify) No No   Sig: Inhale 2 puffs every 6 (six) hours as needed for wheezing   allopurinol (ZYLOPRIM) 300 mg tablet   Yes No   apixaban (Eliquis) 2 5 mg   Yes No   Sig: Take 2 5 mg by mouth 2 (two) times a day For 14 days   aspirin 81 mg chewable tablet   Yes No   Sig: Chew 81 mg daily   atorvastatin (LIPITOR) 10 mg tablet  Outside Facility (Specify) No No   Sig: Take 1 tablet (10 mg total) by mouth daily   betamethasone dipropionate (DIPROSONE) 0 05 % cream  Outside Facility (Specify) Yes No   Sig: Apply topically 3 (three) times a day   bisacodyl (DULCOLAX) 10 mg suppository   Yes No   Sig: Insert 10 mg into the rectum daily as needed for constipation   buPROPion (WELLBUTRIN SR) 150 mg 12 hr tablet  Outside Facility (Specify) No No   Sig: Take 1 tablet (150 mg total) by mouth daily   carBAMazepine (TEGretol) 100 mg chewable tablet  Outside Facility (Specify) No No   Sig: Chew 1 tablet (100 mg total) 3 (three) times a day   clopidogrel (PLAVIX) 75 mg tablet  Outside Facility (Specify) No No   Sig: Take 1 tablet (75 mg total) by mouth daily   digoxin (LANOXIN) 0 125 mg tablet  Outside Facility (Specify) Yes No   Sig: Take 125 mcg by mouth daily   divalproex sodium (DEPAKOTE) 250 mg EC tablet   Yes No   Sig: Take 250 mg by mouth every 12 (twelve) hours    famotidine (PEPCID) 20 mg tablet   Yes No   fenofibrate micronized (LOFIBRA) 67 MG capsule  Outside Facility (Specify) No No   Sig: Take 1 capsule (67 mg total) by mouth daily with breakfast   Patient taking differently: Take 134 mg by mouth daily with breakfast    ferrous sulfate 325 (65 Fe) mg tablet  Outside Facility (Specify) No No   Sig: Take 1 tablet (325 mg total) by mouth 2 (two) times a day   fluticasone-salmeterol (ADVAIR, WIXELA) 250-50 mcg/dose inhaler   No No   Sig: Inhale 1 puff 2 (two) times a day Rinse mouth after use     furosemide (LASIX) 20 mg tablet   No No   Sig: Take 1 tablet (20 mg total) by mouth daily   haloperidol (HALDOL) 1 mg tablet   Yes No   Sig: Take 1 mg by mouth as needed for agitation   ipratropium-albuterol (DUO-NEB) 0 5-2 5 mg/3 mL nebulizer solution  Outside Facility (Specify) Yes No   Sig: Take 3 mL by nebulization 4 (four) times a day   lactobacillus acidophilus  Outside Facility (Specify) Yes No   Sig: Take 1 capsule by mouth daily   levothyroxine 100 mcg tablet   Yes No   melatonin 3 mg   Yes No   Sig: Take 6 mg by mouth daily at bedtime   metoprolol tartrate (LOPRESSOR) 100 mg tablet  Outside Facility (Specify) No No   Sig: Take 1 tablet (100 mg total) by mouth every 12 (twelve) hours   nitroglycerin (NITROSTAT) 0 4 mg SL tablet  Outside Facility (Specify) No No   Sig: Place 1 tablet (0 4 mg total) under the tongue every 5 (five) minutes as needed for chest pain   nystatin (MYCOSTATIN) powder   Yes No   Sig: Apply topically as needed (abdominal irritation)   polyethylene glycol (MIRALAX) 17 g packet   Yes No   Sig: Take 17 g by mouth daily   potassium chloride (K-DUR,KLOR-CON) 20 mEq tablet  Outside Facility (Specify) No No   Sig: Take 1 tablet (20 mEq total) by mouth daily   prochlorperazine (COMPAZINE) 10 mg tablet   Yes No   Sig: Take 10 mg by mouth every 6 (six) hours as needed for nausea or vomiting   senna (SENOKOT) 8 6 MG tablet   Yes No   Sig: Take 1 tablet by mouth daily      Facility-Administered Medications: None       Past Medical History:   Diagnosis Date    Atrial fibrillation ([facility] [address] )     Brain aneurysm     CAD (coronary artery disease)     Cardiac disease     [facility]    CKD (chronic kidney disease) stage 3, GFR 30-59 ml/min ([facility]) ckd    COPD (chronic obstructive pulmonary disease) ([facility])     Dementia ([facility])     Diastolic CHF, chronic ([facility])     GERD (gastroesophageal reflux disease)     Hyperlipidemia     Hyperlipidemia     Hypertension     Hypothyroidism     Migraine     PAD (peripheral artery disease) ([facility])     Renal disorder     Seizures ([facility])     Stroke [facility])        Past Surgical History:   Procedure Laterality Date    APPENDECTOMY      ARTERIOGRAM N/A 12/7/2018    Procedure: ARTERIOGRAM WITH STENT PLACEMENT;  Surgeon: Delpha Romberg Doctor, MD;  [facility]: [facility];  Service: Vascular    BLADDER TUMOR EXCISION      BRAIN SURGERY      1998  clip right frontal lobe    CARDIAC SURGERY      COLONOSCOPY      CORONARY STENT PLACEMENT      5 stents    EYE SURGERY      IR AORTAGRAM WITH RUN-OFF  12/7/2018    MANDIBLE FRACTURE SURGERY      TONSILLECTOMY      TUBAL LIGATION      UTERINE FIBROID SURGERY         Family History   Problem Relation Age of Onset    Heart disease Father     Parkinsonism Father     Macular degeneration Mother     Glaucoma Mother     Diabetes Brother    Priscilla Gonzales Heart disease Brother      I have reviewed and agree with the history as documented  E-Cigarette/Vaping    E-Cigarette Use Never User      E-Cigarette/Vaping Substances    Nicotine No     THC No     CBD No     Flavoring No     Other No     Unknown No      Social History     Tobacco Use    Smoking status: Former Smoker     Packs/day: 5 00     Years: 40 00     Pack years: 200 00     Quit date: 2007     Years since quittin 7    Smokeless tobacco: Never Used   Vaping Use    Vaping Use: Never used   Substance Use Topics    Alcohol use: Not Currently     Alcohol/week: 0 0 standard drinks    Drug use: No       Review of Systems   Constitutional: Negative for chills and fever  HENT: Negative for dental problem and ear pain  Eyes: Negative for pain and redness  Respiratory: Negative for cough and shortness of breath  Cardiovascular: Negative for chest pain and palpitations  Gastrointestinal: Negative for abdominal pain and nausea  Endocrine: Negative for polydipsia and polyphagia  Genitourinary: Negative for dysuria and frequency  +blood in catheter   Musculoskeletal: Negative for arthralgias and joint swelling  Skin: Negative for color change and rash  Neurological: Negative for dizziness and headaches  Psychiatric/Behavioral: Negative for behavioral problems and confusion  All other systems reviewed and are negative  Physical Exam  Physical Exam  Vitals and nursing note reviewed  Constitutional:       General: She is not in acute distress  Appearance: She is well-developed  She is not diaphoretic  HENT:      Head: Atraumatic  Right Ear: External ear normal       Left Ear: External ear normal       Nose: Nose normal    Eyes:      Conjunctiva/sclera: Conjunctivae normal       Pupils: Pupils are equal, round, and reactive to light  Neck:      Vascular: No JVD  Cardiovascular:      Rate and Rhythm: Normal rate and regular rhythm        Heart sounds: Normal heart sounds  No murmur heard  Pulmonary:      Effort: Pulmonary effort is normal  No respiratory distress  Breath sounds: Normal breath sounds  No wheezing  Abdominal:      General: Bowel sounds are normal  There is no distension  Palpations: Abdomen is soft  Tenderness: There is no abdominal tenderness  Genitourinary:     Comments: Davis catheter with bloody urine  Musculoskeletal:         General: Normal range of motion  Cervical back: Normal range of motion and neck supple  Skin:     General: Skin is warm and dry  Capillary Refill: Capillary refill takes less than 2 seconds  Neurological:      Mental Status: She is alert and oriented to person, place, and time  Cranial Nerves: No cranial nerve deficit  Psychiatric:         Behavior: Behavior normal          Vital Signs  ED Triage Vitals [06/18/21 1501]   Temperature Pulse Respirations BP SpO2   98 °F (36 7 °C) (!) 52 19 -- 95 %      Temp Source Heart Rate Source Patient Position - Orthostatic VS BP Location FiO2 (%)   Oral Monitor Lying Right arm --      Pain Score       No Pain           Vitals:    06/18/21 1501   Pulse: (!) 52   Patient Position - Orthostatic VS: Lying         Visual Acuity      ED Medications  Medications - No data to display    Diagnostic Studies  Results Reviewed     Procedure Component Value Units Date/Time    UA w Reflex to Microscopic w Reflex to Culture [[id number]] Collected: 06/18/21 1746    Lab Status:  In process Specimen: Urine, Indwelling Davis Catheter Updated: 06/18/21 1751    Novel Coronavirus (Covid-19),PCR [facility] [[id number]]     Lab Status: No result Specimen: Nares from Nose     Comprehensive metabolic panel [[id number]]  (Abnormal) Collected: 06/18/21 1525    Lab Status: Final result Specimen: Blood from Arm, Right Updated: 06/18/21 1546     Sodium 147 mmol/L      Potassium 4 4 mmol/L      Chloride 106 mmol/L      CO2 35 mmol/L      ANION GAP 6 mmol/L      BUN 32 mg/dL Creatinine 1 56 mg/dL      Glucose 181 mg/dL      Calcium 9 4 mg/dL      Corrected Calcium 10 0 mg/dL      AST 18 U/L      ALT 25 U/L      Alkaline Phosphatase 85 U/L      Total Protein 7 4 g/dL      Albumin 3 2 g/dL      Total Bilirubin 0 35 mg/dL      eGFR 32 ml/min/1 73sq m     Narrative:      National Kidney Disease Foundation guidelines for Chronic Kidney Disease (CKD):     Stage 1 with normal or high GFR (GFR > 90 mL/min/1 73 square meters)    Stage 2 Mild CKD (GFR = 60-89 mL/min/1 73 square meters)    Stage 3A Moderate CKD (GFR = 45-59 mL/min/1 73 square meters)    Stage 3B Moderate CKD (GFR = 30-44 mL/min/1 73 square meters)    Stage 4 Severe CKD (GFR = 15-29 mL/min/1 73 square meters)    Stage 5 End Stage CKD (GFR <15 mL/min/1 73 square meters)  Note: GFR calculation is accurate only with a steady state creatinine    APTT [[id number]]  (Normal) Collected: 06/18/21 1525    Lab Status: Final result Specimen: Blood from Arm, Right Updated: 06/18/21 1541     PTT 30 seconds     Protime-INR [[id number]]  (Normal) Collected: 06/18/21 1525    Lab Status: Final result Specimen: Blood from Arm, Right Updated: 06/18/21 1541     Protime 14 4 seconds      INR 1 10    CBC and differential [[id number]]  (Abnormal) Collected: 06/18/21 1525    Lab Status: Final result Specimen: Blood from Arm, Right Updated: 06/18/21 1531     WBC 6 54 Thousand/uL      RBC 3 63 Million/uL      Hemoglobin 12 5 g/dL      Hematocrit 38 5 %       fL      MCH 34 4 pg      MCHC 32 5 g/dL      RDW 13 0 %      MPV 11 7 fL      Platelets 607 Thousands/uL      nRBC 0 /100 WBCs      Neutrophils Relative 52 %      Immat GRANS % 1 %      Lymphocytes Relative 34 %      Monocytes Relative 8 %      Eosinophils Relative 4 %      Basophils Relative 1 %      Neutrophils Absolute 3 43 Thousands/µL      Immature Grans Absolute 0 06 Thousand/uL      Lymphocytes Absolute 2 24 Thousands/µL      Monocytes Absolute 0 54 Thousand/µL      Eosinophils Absolute 0 23 Thousand/µL      Basophils Absolute 0 04 Thousands/µL                  CT abdomen pelvis wo contrast   Final Result by Frida Soliman MD (06/18 1708)      High density components within the bladder lumen in keeping with intraluminal bladder hemorrhage  Mild circumferential bladder wall thickening and surrounding fat stranding could indicate cystitis or could be reactive to the hemorrhage  The study was marked in [facility] for immediate notification  Workstation performed: [id number]                    Procedures  Procedures         ED Course               Identification of Seniors at Risk      Most Recent Value   (ISAR) Identification of Seniors at Risk   Before the illness or injury that brought you to the Emergency, did you need someone to help you on a regular basis? 0 Filed at: 06/18/2021 1504   In the last 24 hours, have you needed more help than usual?  0 Filed at: 06/18/2021 1504   Have you been hospitalized for one or more nights during the past 6 months? 0 Filed at: 06/18/2021 1504   In general, do you see well?  0 Filed at: 06/18/2021 1504   In general, do you have serious problems with your memory? 0 Filed at: 06/18/2021 1504   Do you take more than three different medications every day?  0 Filed at: 06/18/2021 1504   ISAR Score  0 Filed at: 06/18/2021 1504                    SBIRT 22yo+      Most Recent Value   SBIRT (25 yo +)   In order to provide better care to our patients, we are screening all of our patients for alcohol and drug use  Would it be okay to ask you these screening questions? Yes Filed at: 06/18/2021 1526   Initial Alcohol Screen: US AUDIT-C    1  How often do you have a drink containing alcohol?  0 Filed at: 06/18/2021 1526   2  How many drinks containing alcohol do you have on a typical day you are drinking? 0 Filed at: 06/18/2021 1526   3a  Male UNDER 65: How often do you have five or more drinks on one occasion? 0 Filed at: 06/18/2021 1526   3b  FEMALE Any Age, or MALE 65+: How often do you have 4 or more drinks on one occassion? 0 Filed at: 06/18/2021 1526   Audit-C Score  0 Filed at: 06/18/2021 1526   REINALDO: How many times in the past year have you    Used an illegal drug or used a prescription medication for non-medical reasons? Never Filed at: 06/18/2021 1526                    [facility]   Patient presents with gross hematuria in her chronic Davis catheter  CT consistent with clots in the bladder  Have started continuous bladder irrigation, discussed with Vallorie Primrose from urology who recommends transfer to [facility] for cystoscopy tomorrow  Patient hemoglobin stable, creatinine at baseline for CKD   Will transfer to medicine, discussed with Dr Pennie Gottlieb  Disposition  Final diagnoses:   Hemorrhagic cystitis     Time reflects when diagnosis was documented in both MDM as applicable and the Disposition within this note     Time User Action Codes Description Comment    6/18/2021  5:28 PM Rakesh Costello Add [R31 0] Gross hematuria     6/18/2021  5:30 PM JASEN Ramos  [address] [[address] 91] Hemorrhagic cystitis       ED Disposition     ED Disposition Condition Date/Time Comment    Transfer to Another Facility-In Network  Fri Jun 18, 2021  5:47 PM Leafy Even should be transferred out to [facility]        MD Documentation      Most Recent Value   Patient Condition  The patient has been stabilized such that within reasonable medical probability, no material deterioration of the patient condition or the condition of the unborn child(lili) is likely to result from the transfer   Reason for Transfer  Level of Care needed not available at this facility   Benefits of Transfer  Specialized equipment and/or services available at the receiving facility (Include comment)________________________   Risks of Transfer  Potential for delay in receiving treatment   Accepting Physician  [address] Name, [address]   Sending MD Jessica Grimm   Provider Certification General risk, such as traffic hazards, adverse weather conditions, rough terrain or turbulence, possible failure of equipment (including vehicle or aircraft), or consequences of actions of persons outside the control of the transport personnel, Unanticipated needs of medical equipment and personnel during transport      RN Documentation      [address] Name, [address]   [facility]      Follow-up Information    None         Patient's Medications   Discharge Prescriptions    No medications on file     No discharge procedures on file      PDMP Review     None          ED Provider  Electronically Signed by           Amisha Medrano MD  06/18/21 5809

## 2021-06-18 NOTE — EMTALA/ACUTE CARE TRANSFER
[address] [facility] [address]  [address] [place] [facility] [address]  Dept: [phone]      EMTALA TRANSFER CONSENT    NAME Janet Sims                                         [birth year]                              MRN [id number]    I have been informed of my rights regarding examination, treatment, and transfer   by Dr Garry Webb MD    Benefits: Specialized equipment and/or services available at the receiving facility (Include comment)________________________    Risks: Potential for delay in receiving treatment      Consent for Transfer:  I acknowledge that my medical condition has been evaluated and explained to me by the emergency department physician or other qualified medical person and/or my attending physician, who has recommended that I be transferred to the service of  Accepting Physician: Dori Reese at [address] Name, [address] : [facility]  The above potential benefits of such transfer, the potential risks associated with such transfer, and the probable risks of not being transferred have been explained to me, and I fully understand them  The doctor has explained that, in my case, the benefits of transfer outweigh the risks  I agree to be transferred  I authorize the performance of emergency medical procedures and treatments upon me in both transit and upon arrival at the receiving facility  Additionally, I authorize the release of any and all medical records to the receiving facility and request they be transported with me, if possible  I understand that the safest mode of transportation during a medical emergency is an ambulance and that the Hospital advocates the use of this mode of transport  Risks of traveling to the receiving facility by car, including absence of medical control, life sustaining equipment, such as oxygen, and medical personnel has been explained to me and I fully understand them      (FRANCES CORRECT BOX BELOW)  [  ]  I consent to the stated transfer and to be transported by ambulance/helicopter  [  ]  I consent to the stated transfer, but refuse transportation by ambulance and accept full responsibility for my transportation by car  I understand the risks of non-ambulance transfers and I exonerate the Hospital and its staff from any deterioration in my condition that results from this refusal     X___________________________________________    DATE  21  TIME________  Signature of patient or legally responsible individual signing on patient behalf           RELATIONSHIP TO PATIENT_________________________          Provider Certification    NAME Wm Warner                                         1945                              MRN [id number]    A medical screening exam was performed on the above named patient  Based on the examination:    Condition Necessitating Transfer The primary encounter diagnosis was Gross hematuria  A diagnosis of Hemorrhagic cystitis was also pertinent to this visit  Patient Condition: The patient has been stabilized such that within reasonable medical probability, no material deterioration of the patient condition or the condition of the unborn child(lili) is likely to result from the transfer    Reason for Transfer: Level of Care needed not available at this facility    Transfer Requirements: Facility B   · Space available and qualified personnel available for treatment as acknowledged by    · Agreed to accept transfer and to provide appropriate medical treatment as acknowledged by       Jamal Arroyo  · Appropriate medical records of the examination and treatment of the patient are provided at the time of transfer   [address], [address] _______  · Transfer will be performed by qualified personnel from    and appropriate transfer equipment as required, including the use of necessary and appropriate life support measures      Provider Certification: I have examined the patient and explained the following risks and benefits of being transferred/refusing transfer to the patient/family:  General risk, such as traffic hazards, adverse weather conditions, rough terrain or turbulence, possible failure of equipment (including vehicle or aircraft), or consequences of actions of persons outside the control of the transport personnel, Unanticipated needs of medical equipment and personnel during transport      Based on these reasonable risks and benefits to the patient and/or the unborn child(lili), and based upon the information available at the time of the patients examination, I certify that the medical benefits reasonably to be expected from the provision of appropriate medical treatments at another medical facility outweigh the increasing risks, if any, to the individuals medical condition, and in the case of labor to the unborn child, from effecting the transfer      X____________________________________________ DATE 06/18/21        TIME_______      ORIGINAL - SEND TO MEDICAL RECORDS   COPY - SEND WITH PATIENT DURING TRANSFER

## 2021-06-19 ENCOUNTER — APPOINTMENT (OUTPATIENT)
Dept: RADIOLOGY | Facility: HOSPITAL | Age: 76
DRG: 662 | End: 2021-06-19
Payer: MEDICARE

## 2021-06-19 ENCOUNTER — ANESTHESIA EVENT (OUTPATIENT)
Dept: PERIOP | Facility: HOSPITAL | Age: 76
DRG: 662 | End: 2021-06-19
Payer: MEDICARE

## 2021-06-19 ENCOUNTER — ANESTHESIA (OUTPATIENT)
Dept: PERIOP | Facility: HOSPITAL | Age: 76
DRG: 662 | End: 2021-06-19
Payer: MEDICARE

## 2021-06-19 LAB
ALBUMIN SERPL BCP-MCNC: 3.1 G/DL (ref 3.5–5)
ALP SERPL-CCNC: 77 U/L (ref 46–116)
ALT SERPL W P-5'-P-CCNC: 21 U/L (ref 12–78)
ANION GAP SERPL CALCULATED.3IONS-SCNC: 2 MMOL/L (ref 4–13)
APTT PPP: 27 SECONDS (ref 23–37)
AST SERPL W P-5'-P-CCNC: 14 U/L (ref 5–45)
BACTERIA UR CULT: NORMAL
BASOPHILS # BLD AUTO: 0.03 THOUSANDS/ΜL (ref 0–0.1)
BASOPHILS NFR BLD AUTO: 0 % (ref 0–1)
BILIRUB SERPL-MCNC: 0.32 MG/DL (ref 0.2–1)
BUN SERPL-MCNC: 37 MG/DL (ref 5–25)
CALCIUM ALBUM COR SERPL-MCNC: 10.1 MG/DL (ref 8.3–10.1)
CALCIUM SERPL-MCNC: 9.4 MG/DL (ref 8.3–10.1)
CHLORIDE SERPL-SCNC: 113 MMOL/L (ref 100–108)
CO2 SERPL-SCNC: 33 MMOL/L (ref 21–32)
CREAT SERPL-MCNC: 1.56 MG/DL (ref 0.6–1.3)
EOSINOPHIL # BLD AUTO: 0.18 THOUSAND/ΜL (ref 0–0.61)
EOSINOPHIL NFR BLD AUTO: 2 % (ref 0–6)
ERYTHROCYTE [DISTWIDTH] IN BLOOD BY AUTOMATED COUNT: 13.2 % (ref 11.6–15.1)
GFR SERPL CREATININE-BSD FRML MDRD: 32 ML/MIN/1.73SQ M
GLUCOSE SERPL-MCNC: 159 MG/DL (ref 65–140)
HCT VFR BLD AUTO: 33 % (ref 34.8–46.1)
HGB BLD-MCNC: 10.4 G/DL (ref 11.5–15.4)
IMM GRANULOCYTES # BLD AUTO: 0.09 THOUSAND/UL (ref 0–0.2)
IMM GRANULOCYTES NFR BLD AUTO: 1 % (ref 0–2)
INR PPP: 1.19 (ref 0.84–1.19)
LYMPHOCYTES # BLD AUTO: 2.36 THOUSANDS/ΜL (ref 0.6–4.47)
LYMPHOCYTES NFR BLD AUTO: 26 % (ref 14–44)
MAGNESIUM SERPL-MCNC: 2.6 MG/DL (ref 1.6–2.6)
MCH RBC QN AUTO: 33.8 PG (ref 26.8–34.3)
MCHC RBC AUTO-ENTMCNC: 31.5 G/DL (ref 31.4–37.4)
MCV RBC AUTO: 107 FL (ref 82–98)
MONOCYTES # BLD AUTO: 0.82 THOUSAND/ΜL (ref 0.17–1.22)
MONOCYTES NFR BLD AUTO: 9 % (ref 4–12)
NEUTROPHILS # BLD AUTO: 5.6 THOUSANDS/ΜL (ref 1.85–7.62)
NEUTS SEG NFR BLD AUTO: 62 % (ref 43–75)
NRBC BLD AUTO-RTO: 0 /100 WBCS
PHOSPHATE SERPL-MCNC: 3.9 MG/DL (ref 2.3–4.1)
PLATELET # BLD AUTO: 166 THOUSANDS/UL (ref 149–390)
PMV BLD AUTO: 11.6 FL (ref 8.9–12.7)
POTASSIUM SERPL-SCNC: 3.9 MMOL/L (ref 3.5–5.3)
PROT SERPL-MCNC: 7 G/DL (ref 6.4–8.2)
PROTHROMBIN TIME: 15.1 SECONDS (ref 11.6–14.5)
RBC # BLD AUTO: 3.08 MILLION/UL (ref 3.81–5.12)
SODIUM SERPL-SCNC: 148 MMOL/L (ref 136–145)
TSH SERPL DL<=0.05 MIU/L-ACNC: 36.8 UIU/ML (ref 0.36–3.74)
WBC # BLD AUTO: 9.08 THOUSAND/UL (ref 4.31–10.16)

## 2021-06-19 PROCEDURE — 85610 PROTHROMBIN TIME: CPT | Performed by: INTERNAL MEDICINE

## 2021-06-19 PROCEDURE — G0379 DIRECT REFER HOSPITAL OBSERV: HCPCS

## 2021-06-19 PROCEDURE — 52224 CYSTOSCOPY AND TREATMENT: CPT | Performed by: UROLOGY

## 2021-06-19 PROCEDURE — 0W3R8ZZ CONTROL BLEEDING IN GENITOURINARY TRACT, VIA NATURAL OR ARTIFICIAL OPENING ENDOSCOPIC: ICD-10-PCS | Performed by: UROLOGY

## 2021-06-19 PROCEDURE — 99220 PR INITIAL OBSERVATION CARE/DAY 70 MINUTES: CPT | Performed by: INTERNAL MEDICINE

## 2021-06-19 PROCEDURE — 83735 ASSAY OF MAGNESIUM: CPT | Performed by: INTERNAL MEDICINE

## 2021-06-19 PROCEDURE — 80053 COMPREHEN METABOLIC PANEL: CPT | Performed by: INTERNAL MEDICINE

## 2021-06-19 PROCEDURE — 85730 THROMBOPLASTIN TIME PARTIAL: CPT | Performed by: INTERNAL MEDICINE

## 2021-06-19 PROCEDURE — 88305 TISSUE EXAM BY PATHOLOGIST: CPT | Performed by: PATHOLOGY

## 2021-06-19 PROCEDURE — 99205 OFFICE O/P NEW HI 60 MIN: CPT | Performed by: INTERNAL MEDICINE

## 2021-06-19 PROCEDURE — 0TBB8ZX EXCISION OF BLADDER, VIA NATURAL OR ARTIFICIAL OPENING ENDOSCOPIC, DIAGNOSTIC: ICD-10-PCS | Performed by: UROLOGY

## 2021-06-19 PROCEDURE — 84100 ASSAY OF PHOSPHORUS: CPT | Performed by: INTERNAL MEDICINE

## 2021-06-19 PROCEDURE — 84443 ASSAY THYROID STIM HORMONE: CPT | Performed by: INTERNAL MEDICINE

## 2021-06-19 PROCEDURE — 85025 COMPLETE CBC W/AUTO DIFF WBC: CPT | Performed by: INTERNAL MEDICINE

## 2021-06-19 PROCEDURE — 0TCB8ZZ EXTIRPATION OF MATTER FROM BLADDER, VIA NATURAL OR ARTIFICIAL OPENING ENDOSCOPIC: ICD-10-PCS | Performed by: UROLOGY

## 2021-06-19 RX ORDER — BISACODYL 10 MG
10 SUPPOSITORY, RECTAL RECTAL DAILY PRN
Status: DISCONTINUED | OUTPATIENT
Start: 2021-06-19 | End: 2021-06-25 | Stop reason: HOSPADM

## 2021-06-19 RX ORDER — ONDANSETRON 2 MG/ML
4 INJECTION INTRAMUSCULAR; INTRAVENOUS ONCE AS NEEDED
Status: DISCONTINUED | OUTPATIENT
Start: 2021-06-19 | End: 2021-06-19 | Stop reason: HOSPADM

## 2021-06-19 RX ORDER — LIDOCAINE HYDROCHLORIDE 10 MG/ML
INJECTION, SOLUTION EPIDURAL; INFILTRATION; INTRACAUDAL; PERINEURAL AS NEEDED
Status: DISCONTINUED | OUTPATIENT
Start: 2021-06-19 | End: 2021-06-19

## 2021-06-19 RX ORDER — SODIUM CHLORIDE 9 MG/ML
50 INJECTION, SOLUTION INTRAVENOUS CONTINUOUS
Status: DISCONTINUED | OUTPATIENT
Start: 2021-06-19 | End: 2021-06-20

## 2021-06-19 RX ORDER — PROMETHAZINE HYDROCHLORIDE 25 MG/ML
12.5 INJECTION, SOLUTION INTRAMUSCULAR; INTRAVENOUS ONCE AS NEEDED
Status: DISCONTINUED | OUTPATIENT
Start: 2021-06-19 | End: 2021-06-19 | Stop reason: HOSPADM

## 2021-06-19 RX ORDER — CEFAZOLIN SODIUM 1 G/3ML
INJECTION, POWDER, FOR SOLUTION INTRAMUSCULAR; INTRAVENOUS AS NEEDED
Status: DISCONTINUED | OUTPATIENT
Start: 2021-06-19 | End: 2021-06-19

## 2021-06-19 RX ORDER — ALBUTEROL SULFATE 2.5 MG/3ML
2.5 SOLUTION RESPIRATORY (INHALATION) ONCE AS NEEDED
Status: DISCONTINUED | OUTPATIENT
Start: 2021-06-19 | End: 2021-06-19 | Stop reason: HOSPADM

## 2021-06-19 RX ORDER — MEPERIDINE HYDROCHLORIDE 25 MG/ML
12.5 INJECTION INTRAMUSCULAR; INTRAVENOUS; SUBCUTANEOUS ONCE
Status: DISCONTINUED | OUTPATIENT
Start: 2021-06-19 | End: 2021-06-19 | Stop reason: HOSPADM

## 2021-06-19 RX ORDER — LABETALOL 20 MG/4 ML (5 MG/ML) INTRAVENOUS SYRINGE
5
Status: DISCONTINUED | OUTPATIENT
Start: 2021-06-19 | End: 2021-06-19 | Stop reason: HOSPADM

## 2021-06-19 RX ORDER — HYDROMORPHONE HCL/PF 1 MG/ML
0.5 SYRINGE (ML) INJECTION
Status: DISCONTINUED | OUTPATIENT
Start: 2021-06-19 | End: 2021-06-19 | Stop reason: HOSPADM

## 2021-06-19 RX ORDER — SODIUM CHLORIDE, SODIUM LACTATE, POTASSIUM CHLORIDE, CALCIUM CHLORIDE 600; 310; 30; 20 MG/100ML; MG/100ML; MG/100ML; MG/100ML
125 INJECTION, SOLUTION INTRAVENOUS CONTINUOUS
Status: DISCONTINUED | OUTPATIENT
Start: 2021-06-19 | End: 2021-06-21

## 2021-06-19 RX ORDER — PHENAZOPYRIDINE HYDROCHLORIDE 100 MG/1
100 TABLET, FILM COATED ORAL
Status: DISCONTINUED | OUTPATIENT
Start: 2021-06-19 | End: 2021-06-25 | Stop reason: HOSPADM

## 2021-06-19 RX ORDER — SODIUM CHLORIDE 9 MG/ML
INJECTION, SOLUTION INTRAVENOUS CONTINUOUS PRN
Status: DISCONTINUED | OUTPATIENT
Start: 2021-06-19 | End: 2021-06-19

## 2021-06-19 RX ORDER — PROPOFOL 10 MG/ML
INJECTION, EMULSION INTRAVENOUS AS NEEDED
Status: DISCONTINUED | OUTPATIENT
Start: 2021-06-19 | End: 2021-06-19

## 2021-06-19 RX ORDER — ATROPA BELLADONNA AND OPIUM 16.2; 3 MG/1; MG/1
30 SUPPOSITORY RECTAL EVERY 8 HOURS PRN
Status: DISCONTINUED | OUTPATIENT
Start: 2021-06-19 | End: 2021-06-25 | Stop reason: HOSPADM

## 2021-06-19 RX ORDER — FENTANYL CITRATE/PF 50 MCG/ML
25 SYRINGE (ML) INJECTION
Status: DISCONTINUED | OUTPATIENT
Start: 2021-06-19 | End: 2021-06-19 | Stop reason: HOSPADM

## 2021-06-19 RX ADMIN — PHENYLEPHRINE HYDROCHLORIDE 200 MCG: 10 INJECTION INTRAVENOUS at 19:59

## 2021-06-19 RX ADMIN — METOPROLOL TARTRATE 100 MG: 50 TABLET, FILM COATED ORAL at 23:25

## 2021-06-19 RX ADMIN — LEVOTHYROXINE SODIUM 100 MCG: 100 TABLET ORAL at 06:21

## 2021-06-19 RX ADMIN — PHENYLEPHRINE HYDROCHLORIDE 200 MCG: 10 INJECTION INTRAVENOUS at 19:54

## 2021-06-19 RX ADMIN — DIVALPROEX SODIUM 250 MG: 250 TABLET, DELAYED RELEASE ORAL at 23:25

## 2021-06-19 RX ADMIN — SODIUM CHLORIDE: 0.9 INJECTION, SOLUTION INTRAVENOUS at 19:28

## 2021-06-19 RX ADMIN — CARBAMAZEPINE 100 MG: 100 TABLET, CHEWABLE ORAL at 23:25

## 2021-06-19 RX ADMIN — SODIUM CHLORIDE 50 ML/HR: 0.9 INJECTION, SOLUTION INTRAVENOUS at 08:49

## 2021-06-19 RX ADMIN — CEFAZOLIN 1000 MG: 1 INJECTION, POWDER, FOR SOLUTION INTRAMUSCULAR; INTRAVENOUS at 19:36

## 2021-06-19 RX ADMIN — DIVALPROEX SODIUM 250 MG: 250 TABLET, DELAYED RELEASE ORAL at 08:51

## 2021-06-19 RX ADMIN — PROPOFOL 150 MG: 10 INJECTION, EMULSION INTRAVENOUS at 19:42

## 2021-06-19 RX ADMIN — PHENAZOPYRIDINE 100 MG: 100 TABLET ORAL at 12:02

## 2021-06-19 RX ADMIN — CARBAMAZEPINE 100 MG: 100 TABLET, CHEWABLE ORAL at 08:48

## 2021-06-19 RX ADMIN — PHENYLEPHRINE HYDROCHLORIDE 200 MCG: 10 INJECTION INTRAVENOUS at 20:12

## 2021-06-19 RX ADMIN — LIDOCAINE HYDROCHLORIDE 50 MG: 10 INJECTION, SOLUTION EPIDURAL; INFILTRATION; INTRACAUDAL; PERINEURAL at 19:42

## 2021-06-19 RX ADMIN — SODIUM CHLORIDE, SODIUM LACTATE, POTASSIUM CHLORIDE, AND CALCIUM CHLORIDE 125 ML/HR: .6; .31; .03; .02 INJECTION, SOLUTION INTRAVENOUS at 20:54

## 2021-06-19 RX ADMIN — PHENYLEPHRINE HYDROCHLORIDE 200 MCG: 10 INJECTION INTRAVENOUS at 19:45

## 2021-06-19 RX ADMIN — CARBAMAZEPINE 100 MG: 100 TABLET, CHEWABLE ORAL at 17:02

## 2021-06-19 RX ADMIN — PHENAZOPYRIDINE 100 MG: 100 TABLET ORAL at 17:02

## 2021-06-19 RX ADMIN — PHENYLEPHRINE HYDROCHLORIDE 100 MCG: 10 INJECTION INTRAVENOUS at 19:42

## 2021-06-19 RX ADMIN — SODIUM CHLORIDE 50 ML/HR: 0.9 INJECTION, SOLUTION INTRAVENOUS at 21:09

## 2021-06-19 RX ADMIN — LORAZEPAM 0.5 MG: 0.5 TABLET ORAL at 22:06

## 2021-06-19 NOTE — ASSESSMENT & PLAN NOTE
· CBI  · Urology consult  · [email or web address] for cysto tomorrow  · No signs of sepsis  · F/u U Cx

## 2021-06-19 NOTE — ANESTHESIA PREPROCEDURE EVALUATION
Review of Systems/Medical History  Patient summary reviewed  Chart reviewed  Cardiovascular  EKG reviewed , Negative cardio ROS Hyperlipidemia, Hypertension , Past MI > 6 months, CAD , CAD status: 3VD, Cardiac stents  Dysrhythmias , atrial fibrillation, CHF ,   Comment:    LEFT VENTRICLE:  Systolic function was mildly reduced  Ejection fraction was estimated to be 45 %  There was mild diffuse hypokinesis      AORTIC VALVE:  There was mild regurgitation      IVC, HEPATIC VEINS:  The inferior vena cava was dilated    ,  Pulmonary  Pneumonia, COPD moderate- medication dependent , Shortness of breath, Sleep apnea ,        GI/Hepatic  Negative GI/hepatic ROS   GERD ,        Chronic kidney disease stage 3,        Endo/Other  Negative endo/other ROS History of thyroid disease , hypothyroidism,      GYN  Negative gynecology ROS          Hematology  Anemia ,     Musculoskeletal  Negative musculoskeletal ROS        Neurology  Negative neurology ROS Seizures ,  CVA , Headaches,   Comment: Brain aneurysm Psychology   Negative psychology ROS              Physical Exam    Airway    Mallampati score: III  TM Distance: >3 FB  Neck ROM: full     Dental       Cardiovascular  Comment: Negative ROS, Cardiovascular exam normal    Pulmonary  Pulmonary exam normal     Other Findings        Anesthesia Plan  ASA Score- 3     Anesthesia Type- general with ASA Monitors  Additional Monitors:   Airway Plan: LMA  Comment: Patient seen and examined, history reviewed  Patient to be done under general anesthesia with LMA and routine monitors  Risks discussed with the patient, consent obtained          Plan Factors-    Chart reviewed  EKG reviewed  Existing labs reviewed  Patient summary reviewed  Induction- intravenous  Postoperative Plan-     Informed Consent- Anesthetic plan and risks discussed with daughter  I personally reviewed this patient with the CRNA   Discussed and agreed on the Anesthesia Plan with the GRIFFIN [facility]

## 2021-06-19 NOTE — H&P
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  Assessment & Plan  · C/w Depakote and Wellbutrin    Paroxysmal atrial flutter (HCC)  Assessment & Plan  · Hold ELiquis  · C/w BB and digoxin        VTE Prophylaxis: Pharmacologic VTE Prophylaxis contraindicated due to Hematuria  / sequential compression device   Code Status: Level 3 - DNAR and DNI   POLST: There is no POLST form on file for this patient (pre-hospital)    Anticipated Length of Stay:  Patient will be admitted on an Observation basis with an anticipated length of stay of  less than 2 midnights  Justification for Hospital Stay: Please see detailed plans noted above  Chief Complaint:     Blood in Davis bag  History of Present Illness: Geraldo Pastrana is a 77 y o  female who has past medical history significant for COPD and used to be on inhalers, depression, vitamin-D deficiency, seizure disorder on both carbamazepine and Depakote, hyper cholesterolemia, hypothyroidism and essential hypertension who is a transfer from [facility] with complaints of gross hematuria in the Davis catheter draining into the [employer]  According to notes, patient has no ongoing complaints however with note of gross hematuria in the Davis catheter and draining to the Uro back as noted  Patient denies any pain  Denies any nausea or vomiting or fever  Patient also denies any shortness of breath    She is a resident of a personal care home and [facility] and with the ongoing hematuria, the patient was therefore transferred to [facility] for further evaluation specifically with a cystoscopy by Urology for tomorrow  Currently, patient is not complaining of any pain  Patient even does not complain of tenderness when pressed at the hypogastric area  Denies any shortness of breath  She also denies any other complaints such as chest pain or fever or dizziness or cough or cold      Review of Systems:    Constitutional:  Denies fever or chills   Eyes:  Denies change in visual acuity   HENT:  Denies nasal congestion or sore throat   Respiratory:  Denies cough or shortness of breath   Cardiovascular:  Denies chest pain or edema   GI:  Denies abdominal pain, nausea, vomiting, bloody stools or diarrhea   :  Denies dysuria   Musculoskeletal:  Denies back pain or joint pain   Integument:  Denies rash   Neurologic:  Denies headache, focal weakness or sensory changes   Endocrine:  Denies polyuria or polydipsia   Lymphatic:  Denies swollen glands   Psychiatric:  Denies depression or anxiety     Past Medical and Surgical History:   Past Medical History:   Diagnosis Date    Atrial fibrillation ([facility] [address] )     Brain aneurysm     CAD (coronary artery disease)     Cardiac disease     had stents 12 years ago in [facility]    CKD (chronic kidney disease) stage 3, GFR 30-59 ml/min ([facility]) ckd    COPD (chronic obstructive pulmonary disease) ([facility])     Dementia ([facility])     Diastolic CHF, chronic ([facility])     GERD (gastroesophageal reflux disease)     Hyperlipidemia     Hyperlipidemia     Hypertension     Hypothyroidism     Migraine     PAD (peripheral artery disease) ([facility])     Renal disorder     Seizures ([facility] [address] )     Stroke [facility])      Past Surgical History:   Procedure Laterality Date    APPENDECTOMY      ARTERIOGRAM N/A 12/7/2018    Procedure: ARTERIOGRAM WITH STENT PLACEMENT;  Surgeon: Sonia Reddy MD; Location: [facility] OR;  Service: Vascular    BLADDER TUMOR EXCISION      BRAIN SURGERY      1998  clip right frontal lobe    CARDIAC SURGERY      COLONOSCOPY      CORONARY STENT PLACEMENT      5 stents    EYE SURGERY      IR AORTAGRAM WITH RUN-OFF  12/7/2018    MANDIBLE FRACTURE SURGERY      TONSILLECTOMY      TUBAL LIGATION      UTERINE FIBROID SURGERY         Meds/Allergies:  Medications Prior to Admission   Medication    Acetaminophen 325 MG CAPS    albuterol (PROVENTIL HFA,VENTOLIN HFA) 90 mcg/act inhaler    allopurinol (ZYLOPRIM) 300 mg tablet    apixaban (Eliquis) 2 5 mg    aspirin 81 mg chewable tablet    atorvastatin (LIPITOR) 10 mg tablet    betamethasone dipropionate (DIPROSONE) 0 05 % cream    bisacodyl (DULCOLAX) 10 mg suppository    buPROPion (WELLBUTRIN SR) 150 mg 12 hr tablet    carBAMazepine (TEGretol) 100 mg chewable tablet    Cholecalciferol (VITAMIN D) 2000 units CAPS    Citric Hx-Syrrlqwokyu-Sk Carb (RENACIDIN) SOLN    clopidogrel (PLAVIX) 75 mg tablet    digoxin (LANOXIN) 0 125 mg tablet    divalproex sodium (DEPAKOTE) 250 mg EC tablet    famotidine (PEPCID) 20 mg tablet    fenofibrate micronized (LOFIBRA) 67 MG capsule    ferrous sulfate 325 (65 Fe) mg tablet    fluticasone-salmeterol (ADVAIR, WIXELA) 250-50 mcg/dose inhaler    furosemide (LASIX) 20 mg tablet    haloperidol (HALDOL) 1 mg tablet    Hydromorphone HCl 1 MG/ML LIQD    Hyoscyamine Sulfate (HYOSCYAMINE PO)    ipratropium-albuterol (DUO-NEB) 0 5-2 5 mg/3 mL nebulizer solution    lactobacillus acidophilus    levothyroxine 100 mcg tablet    LORazepam (ATIVAN) 0 5 mg tablet    melatonin 3 mg    metoprolol tartrate (LOPRESSOR) 100 mg tablet    nitroglycerin (NITROSTAT) 0 4 mg SL tablet    nystatin (MYCOSTATIN) powder    polyethylene glycol (MIRALAX) 17 g packet    potassium chloride (K-DUR,KLOR-CON) 20 mEq tablet    prochlorperazine (COMPAZINE) 10 mg tablet    senna (SENOKOT) 8 6 MG tablet Allergies: Allergies   Allergen Reactions    Dye [Iodinated Diagnostic Agents] Other (See Comments)     Affects kidneys    Spiriva Handihaler [Tiotropium Bromide Monohydrate] Swelling    Ramipril Facial Swelling    Penicillins      unsure     History:  Marital Status:    Occupation:  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  Patient Pre-hospital Living Situation:  Lives in personal care home  Patient Pre-hospital Level of Mobility:  Needs assistance  Patient Pre-hospital Diet Restrictions:  Cardiac  Substance Use History:   Social History     Substance and Sexual Activity   Alcohol Use Not Currently    Alcohol/week: 0 0 standard drinks     Social History     Tobacco Use   Smoking Status Former Smoker    Packs/day: 5 00    Years: 40 00    Pack years: 200 00    Quit date: 2007    Years since quittin 7   Smokeless Tobacco Never Used     Social History     Substance and Sexual Activity   Drug Use No       Family History:  Family History   Problem Relation Age of Onset    Heart disease Father     Parkinsonism Father     Macular degeneration Mother     Glaucoma Mother     Diabetes Brother     Heart disease Brother        Physical Exam:     Vitals:   Blood Pressure: 135/73 (21)  Pulse: 58 (21)  Temperature: 97 8 °F (36 6 °C) (21)  Temp Source: Oral (21)  Respirations: 16 (21)  Height: 5' 2" (157 5 cm) (21)  Weight - Scale: 74 4 kg (164 lb) (21)  SpO2: 97 % (21)    Constitutional:  Well developed, well nourished, no acute distress, non-toxic appearance   Eyes:  PERRL, conjunctiva normal   HENT:  Atraumatic, external ears normal, nose normal, oropharynx moist, no pharyngeal exudates   Neck- normal range of motion, no tenderness, supple   Respiratory:  No respiratory distress, normal breath sounds, no rales, no wheezing   Cardiovascular:  Normal rate, normal rhythm, no murmurs, no gallops, no rubs   GI:  Soft, nondistended, normal bowel sounds, nontender, no organomegaly, no mass, no rebound, no guarding   :  No costovertebral angle tenderness with note of gross hematuria draining into the Baca catheter with some of the drainage in the collection  Musculoskeletal:  No edema, no tenderness, no deformities  Back- no tenderness  Integument:  Well hydrated, no rash   Lymphatic:  No lymphadenopathy noted   Neurologic:  Alert &awake, communicative, CN 2-12 normal, normal motor function, normal sensory function, no focal deficits noted   Psychiatric:  Speech and behavior appropriate       Lab Results: I have personally reviewed pertinent reports  Results from last 7 days   Lab Units 06/18/21  1525   WBC Thousand/uL 6 54   HEMOGLOBIN g/dL 12 5   HEMATOCRIT % 38 5   PLATELETS Thousands/uL 188   NEUTROS PCT % 52   LYMPHS PCT % 34   MONOS PCT % 8   EOS PCT % 4     Results from last 7 days   Lab Units 06/18/21  1525   POTASSIUM mmol/L 4 4   CHLORIDE mmol/L 106   CO2 mmol/L 35*   BUN mg/dL 32*   CREATININE mg/dL 1 56*   CALCIUM mg/dL 9 4   ALK PHOS U/L 85   ALT U/L 25   AST U/L 18     Results from last 7 days   Lab Units 06/18/21  1525   INR  1 10           Imaging: I have personally reviewed pertinent reports  CT abdomen pelvis wo contrast    Result Date: 6/18/2021  Narrative: CT ABDOMEN AND PELVIS WITHOUT IV CONTRAST INDICATION:   blood in baca catheter  "pt presents via EMS from [place] for c/o blood in her baca for the past 2 days  Pt denies any other c/o arrival"  "78 yo F with dementia, chronic baca catheter presents with blood in baca bag for the past two days   Patient declines any pain or any other complaints" COMPARISON:  CT chest abdomen pelvis 6/1/2021 TECHNIQUE:  CT examination of the abdomen and pelvis was performed without intravenous contrast   Axial, sagittal, and coronal 2D reformatted images were created from the source data and submitted for interpretation  Radiation dose length product (DLP) for this visit:  631 mGy-cm   This examination, like all CT scans performed in the [facility], was performed utilizing techniques to minimize radiation dose exposure, including the use of iterative reconstruction and automated exposure control  Enteric contrast was administered  FINDINGS: ABDOMEN LOWER CHEST:  A 16 x 13 mm left lower lobe lobulated opacity is partially imaged and unchanged from the prior CT chest  Cardiomegaly  LIVER/BILIARY TREE:  Unremarkable  GALLBLADDER:  There are gallstone(s) within the gallbladder, without pericholecystic inflammatory changes  SPLEEN:  Unremarkable  PANCREAS:  Unremarkable  ADRENAL GLANDS:  Unremarkable  KIDNEYS/URETERS:  Congenitally absent right kidney  No left hydronephrosis  Moderate left renal vascular calcifications  No ureteral calculi  STOMACH AND BOWEL:  Prominent colonic stool  No bowel obstruction  APPENDIX:  No findings to suggest appendicitis  ABDOMINOPELVIC CAVITY:  No ascites  No pneumoperitoneum  No lymphadenopathy  VESSELS:  Unremarkable for patient's age  PELVIS REPRODUCTIVE ORGANS:  Unremarkable for patient's age  URINARY BLADDER:  High density components within the bladder lumen in keeping with intraluminal bladder hemorrhage  Mild circumferential bladder wall thickening and surrounding fat stranding could indicate cystitis or could be reactive to the hemorrhage  A Davis catheter is present  ABDOMINAL WALL/INGUINAL REGIONS:  Unchanged right para midline, fat-containing, anterior pelvic wall hernia unchanged in size  OSSEOUS STRUCTURES:  No acute fracture or destructive osseous lesion  Impression: High density components within the bladder lumen in keeping with intraluminal bladder hemorrhage  Mild circumferential bladder wall thickening and surrounding fat stranding could indicate cystitis or could be reactive to the hemorrhage   The study was marked in [facility] for immediate notification  Workstation performed: [id number]     CT chest abdomen pelvis wo contrast    Result Date: 6/1/2021  Narrative: CT CHEST, ABDOMEN AND PELVIS WITHOUT IV CONTRAST INDICATION:   Polytrauma, critical, chest-abdomen-pelvis injury suspected Trauma  COMPARISON:  Multiple priors most recently chest radiograph 12/20/2020 TECHNIQUE: CT examination of the chest, abdomen and pelvis was performed without intravenous contrast   Axial, sagittal, and coronal 2D reformatted images were created from the source data and submitted for interpretation  Radiation dose length product (DLP) for this visit:  1185 65 mGy-cm   This examination, like all CT scans performed in the [facility], was performed utilizing techniques to minimize radiation dose exposure, including the use of iterative reconstruction and automated exposure control  Enteric contrast was not administered  FINDINGS: CHEST LUNGS: Innumerable bilateral pulmonary nodules  Index nodules as follows: Right middle lobe 0 6 cm (series 3, image 81)  0 4 cm pulmonary nodule right lower lobe (series 3, image 88)  Lobulated nodular opacity in the left lower lobe measuring 1 6 x 1 3 cm similar to study of 12/10/2017  PLEURA:  Unremarkable  HEART/GREAT VESSELS:  Atherosclerotic changes are noted in thoracic aorta and coronary arteries  Cardiomegaly  MEDIASTINUM AND ANNE:  Unremarkable  CHEST WALL AND LOWER NECK:   Heterogeneous thyroid gland  Mild stranding of the dorsal midline subcutaneous tissues may represent contusive change  ABDOMEN LIVER/BILIARY TREE:  Unremarkable  GALLBLADDER:  There are gallstone(s) within the gallbladder, without pericholecystic inflammatory changes  SPLEEN:  Unremarkable  PANCREAS:  Unremarkable  ADRENAL GLANDS:  Unremarkable  KIDNEYS/URETERS:  Congenital absence of the right kidney  STOMACH AND BOWEL:  Unremarkable  APPENDIX:  No findings to suggest appendicitis  ABDOMINOPELVIC CAVITY:  No ascites  No pneumoperitoneum    No lymphadenopathy  VESSELS:  Atherosclerotic changes are present  No evidence of aneurysm  PELVIS REPRODUCTIVE ORGANS:  Unremarkable for patient's age  URINARY BLADDER:  Collapsed around a Davis catheter  ABDOMINAL WALL/INGUINAL REGIONS:  Fat containing ventral abdominal hernia unchanged  OSSEOUS STRUCTURES:  No acute fracture or destructive osseous lesion  Spinal degenerative changes are noted  Impression: No acute traumatic injury identified  Multiple pulmonary nodules with lobulated nodular opacity in the left lower lobe  Findings are essentially unchanged since study of 12/10/2017  Workstation performed: [id number]     CT recon only thoracolumbar (no charge)    Result Date: 6/1/2021  Narrative: CT THORACIC AND LUMBAR SPINE INDICATION:   Trauma  COMPARISON: TECHNIQUE: Axial CT examination of the thoracic and lumbar spine was obtained utilizing reconstructed images from CT of the chest abdomen and pelvis performed the same day  Images were reformatted in the sagittal and coronal planes  This examination, like all CT scans performed in the [facility], was performed utilizing techniques to minimize radiation dose exposure, including the use of iterative reconstruction and automated exposure control  FINDINGS: ALIGNMENT: Normal alignment of lumbar vertebral bodies  No subluxation  VERTEBRAL BODIES: No fracture  Multilevel vertebral body hemangiomas  DEGENERATIVE CHANGES: Mild multilevel degenerative disc disease  PREVERTEBRAL AND PARASPINAL SOFT TISSUES:  No prevertebral hematoma  Impression: No fracture or traumatic subluxation  Mild multilevel degenerative changes Workstation performed: [id number]         ** Please Note: Dragon 360 Dictation voice to text software was used in the creation of this document   **

## 2021-06-19 NOTE — ED NOTES
[place] [address]  [phone] for report  Dr Billie James accepting    Alverda to call back with FLOR Rehman RN  06/18/21 5339

## 2021-06-19 NOTE — ASSESSMENT & PLAN NOTE
· CBI still bleeding so, will get cysto today  · Urology consult  · NPO status   · No signs of sepsis  · F/u U Cx

## 2021-06-19 NOTE — PROGRESS NOTES
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  Assessment & Plan  · C/w Depakote and Wellbutrin    Paroxysmal atrial flutter (HCC)  Assessment & Plan  · Hold ELiquis  · C/w BB and digoxin             VTE Pharmacologic Prophylaxis:   she is off a/c due to hematuria    Patient Centered Rounds: I performed bedside rounds with nursing staff today  Discussions with Specialists or Other Care Team Provider: urology     Education and Discussions with Family / Patient: Updated  (daughter) at bedside  Time Spent for Care: 45 minutes  More than 50% of total time spent on counseling and coordination of care as described above  Current Length of Stay: 1 day(s)  Current Patient Status: Observation   Certification Statement: The patient will continue to require additional inpatient hospital stay due to still having hematuria  Discharge Plan: monitoring for bleeding, if improves will discharge, possible 72 hours     Code Status: Level 3 - DNAR and DNI    Subjective:   Patient is very anxious at this time  I have discussed with them  Objective:     Vitals:   Temp (24hrs), Av 7 °F (36 5 °C), Min:97 5 °F (36 4 °C), Max:97 8 °F (36 6 °C)    Temp:  [97 5 °F (36 4 °C)-97 8 °F (36 6 °C)] 97 8 °F (36 6 °C)  HR:  [54-73] 73  Resp:  [16-21] 18  BP: (107-166)/(59-78) 107/60  SpO2:  [93 %-98 %] 96 %  Body mass index is 30 kg/m²       Input and Output Summary (last 24 hours): Intake/Output Summary (Last 24 hours) at 6/19/2021 1552  Last data filed at 6/19/2021 1533  Gross per 24 hour   Intake 0 ml   Output 75188 ml   Net -86955 ml       Physical Exam:   Physical Exam  Vitals and nursing note reviewed  Constitutional:       Appearance: Normal appearance  HENT:      Head: Normocephalic and atraumatic  Mouth/Throat:      Mouth: Mucous membranes are dry  Pharynx: Oropharynx is clear  Eyes:      General: No scleral icterus  Right eye: No discharge  Left eye: No discharge  Extraocular Movements: Extraocular movements intact  Conjunctiva/sclera: Conjunctivae normal       Pupils: Pupils are equal, round, and reactive to light  Cardiovascular:      Rate and Rhythm: Normal rate  Pulmonary:      Effort: Pulmonary effort is normal       Breath sounds: Normal breath sounds  Abdominal:      General: Abdomen is flat  Bowel sounds are normal  There is no distension  Palpations: Abdomen is soft  There is no mass  Tenderness: There is no abdominal tenderness  Hernia: No hernia is present  Musculoskeletal:         General: No swelling, tenderness, deformity or signs of injury  Normal range of motion  Right lower leg: No edema  Left lower leg: No edema  Skin:     General: Skin is warm and dry  Coloration: Skin is not jaundiced or pale  Findings: No bruising, erythema, lesion or rash  Neurological:      General: No focal deficit present  Mental Status: She is alert and oriented to person, place, and time  Mental status is at baseline  Cranial Nerves: No cranial nerve deficit  Sensory: No sensory deficit  Motor: No weakness        Coordination: Coordination normal    Psychiatric:         Mood and Affect: Mood normal           Additional Data:     Labs:  Results from last 7 days   Lab Units 06/19/21  0738   WBC Thousand/uL 9 08   HEMOGLOBIN g/dL 10 4*   HEMATOCRIT % 33 0*   PLATELETS Thousands/uL 166   NEUTROS PCT % 62   LYMPHS PCT % 26   MONOS PCT % 9   EOS PCT % 2     Results from last 7 days   Lab Units 06/19/21  0738   SODIUM mmol/L 148*   POTASSIUM mmol/L 3 9   CHLORIDE mmol/L 113*   CO2 mmol/L 33*   BUN mg/dL 37*   CREATININE mg/dL 1 56*   ANION GAP mmol/L 2*   CALCIUM mg/dL 9 4   ALBUMIN g/dL 3 1*   TOTAL BILIRUBIN mg/dL 0 32   ALK PHOS U/L 77   ALT U/L 21   AST U/L 14   GLUCOSE RANDOM mg/dL 159*     Results from last 7 days   Lab Units 06/19/21  0738   INR  1 19                   Lines/Drains:  Invasive Devices     Peripheral Intravenous Line            Peripheral IV 06/18/21 Right Antecubital 1 day          Drain            Urethral Catheter Three way 16 Fr  1 day              Urinary Catheter:  Goal for removal: N/A- Discharging with Davis               Imaging: Reviewed radiology reports from this admission including: abdominal/pelvic CT    Recent Cultures (last 7 days):         Last 24 Hours Medication List:   Current Facility-Administered Medications   Medication Dose Route Frequency Provider Last Rate    acetaminophen  650 mg Oral Q8H PRN Shannan Navarrete MD      albuterol  2 puff Inhalation Q6H PRN Shannan Navarrete MD      atorvastatin  10 mg Oral After Ethel Cervantes MD      belladonna-opium  30 mg Rectal Q8H PRN Emely Pierson MD      bisacodyl  10 mg Rectal Daily PRN Beth Boyce DO      buPROPion  150 mg Oral Daily Shannan Navarrete MD      carBAMazepine  100 mg Oral TID Shannan Navarrete MD      cholecalciferol  2,000 Units Oral Daily Shannan Navarrete MD      digoxin  125 mcg Oral Daily Shannan Navarrete MD      divalproex sodium  250 mg Oral Q12H [address] Shannan Navarrete MD      famotidine  20 mg Oral Daily Shannan Navarrete MD      fenofibrate  145 mg Oral Daily Shannan Navarrete MD      ferrous sulfate  325 mg Oral BID Shannan Navarrete MD      furosemide  20 mg Oral Daily Shannan Navarrete MD      haloperidol  1 mg Oral Q8H PRN Jamie Maddison Hale MD      HYDROmorphone  2 mg Oral Q4H PRN Nisa Merrill MD      hyoscyamine  0 125 mg Sublingual Q4H PRN Nisa Merrill MD      levothyroxine  100 mcg Oral Early Morning Nisa Merrill MD      LORazepam  0 5 mg Oral HS Nisa Merrill MD      metoprolol tartrate  100 mg Oral Q12H [facility] Nisa Merrill MD      nitroglycerin  0 4 mg Sublingual Q5 Min PRN Nisa Merrill MD      ondansetron  4 mg Intravenous Q6H PRN Nisa Merrill MD      phenazopyridine  100 mg Oral TID With Meals Ladan Matamoros MD      polyethylene glycol  17 g Oral Daily PRN Nisa Merrill MD      potassium chloride  20 mEq Oral Daily Nisa Merrill MD      senna  1 tablet Oral Daily Nisa Merrill MD      sodium chloride  50 mL/hr Intravenous Continuous Ladan Matamoros MD 50 mL/hr (06/19/21 0849)        Today, Patient Was Seen By: Ladan Matamoros MD    **Please Note: This note may have been constructed using a voice recognition system  **

## 2021-06-19 NOTE — CASE MANAGEMENT
LOS: 1   Not a bundle   Re-admission risk: N/A observation status  OBS notice reviewed with daughter via TC  Copy placed in medical records bin  CM placed TC to pts daughter Jeromy Morel [phone] in order to introudce self and CM role  CM assesment completed  Daughter reports pt is currently a LTC resident at [facility]  Daughter reports pt is newly admitted and has resided at [facility] for the last 6 weeks  Daughter report's pt was also receiving IP PT/OT  Daughter states pt requires assistance with all ADL's and ambulation  Pt has DME which include's RW  Pt has received both IP and OP PT/OT services frequently throughout the past 7 years  No D&A tx hx  Daughter reports prior IP MH tx hx 5 year's ago  Daughter unable to provide name of facility at this time  No current or recent MH tx hx  Preffered pharmacy is [facility] by [facility]  PCP is [address] physician  Daughter reports pt has formal POA/LW in place  Advanced directives is currently on file  POA and main emergency contact is daughter Linette Davis ([phone])  Per daughter  Pt will require transport back to nursing facility upon d/c      CM placed TC to [facility] ([phone]) and spoke with Dot  Above information confirmed  Pt is currently a MA bedhold at [facility]  Pt's daughter states she is in the process of seeking alternative LTC options for pt  Daughter informed plan is for pt to return to [facility], and will inform CM team if she would request placement else where prior to D/C      CM placed referral via [facility] for pt to return to [facility] once medically stable for D/C Per daughter request      CM reviewed d/c planning process including the following: identifying help at home, patient preference for d/c planning needs, Discharge Lounge, [facility] Meds to Bed program, availability of treatment team to discuss questions or concerns patient and/or family may have regarding understanding medications and recognizing signs and symptoms once discharged  CM also encouraged patient to follow up with all recommended appointments after discharge  Patient advised of importance for patient and family to participate in managing patients medical well being

## 2021-06-19 NOTE — CONSULTS
Consultation - Urology   Collette Glad 77 y o  female MRN: [id number]  Unit/Bed#: [facility] 931-01 Encounter: [id number]      Assessment/Plan      Assessment:  77year old female nursing home resident w/ dementia, atrial fibrillation on Eliquis, hypothyroidism, dCHF, COPD, JESENIA, CAD, HTN, HLD, PAD, CKD, chronic indwelling baca catheter, presenting initially to [facility] with gross hematuria  Patient transferred to [facility] for Urology evaluation  Plan:    1 Gross hematuria   -Patient presents with gross hematuria  Hx of chronic indwelling baca catheter  -CBI initiated at [facility] with manual irrigation  Clots noted  -Patient currently stable  Denying significant pain or discomfort  Resting/sleeping comfortably  -Hemoglobin remains stable  -Patient to be evaluated in the morning for possible OR for cysto   -Continue to monitor  -Continue CBI  -Continue to hold Eliquis, ASA   -Continue to trend hgb  Currently stable  2  Multiple medical comorbidities  -SLIM primary  -Medical management per SLIM  -Stable     History of Present Illness   Attending: Nan Meyers DO    HPI: Collette Glad is a 77y o  year old female who presents with gross hematuria  Patient is a nursing home resident with an indwelling chronic baca catheter  Was sent to the [facility] ED for evaluation regarding increased hematuria  Patient has since remained stable  Patient denied significant pain or discomfort  CT scan was obtained in the ED at [facility] which confirmed clots to be present in the bladder  Hgb stable  CBI started  Inpatient consult to Urology  Consult performed by: Ronny Stinson PA-C  Consult ordered by: Lesvia Hughes MD          Review of Systems   Constitutional: Negative  HENT: Negative  Eyes: Negative  Respiratory: Negative  Cardiovascular: Negative  Gastrointestinal: Negative  Endocrine: Negative  Genitourinary: Positive for hematuria  Musculoskeletal: Negative  Allergic/Immunologic: Negative  Neurological: Negative  Hematological: Negative  Psychiatric/Behavioral: Negative          Historical Information   Past Medical History:   Diagnosis Date    Atrial fibrillation ([address] )     Brain aneurysm     CAD (coronary artery disease)     Cardiac disease     had stents 12 years ago in [facility]    CKD (chronic kidney disease) stage 3, GFR 30-59 ml/min (HCC) ckd    COPD (chronic obstructive pulmonary disease) (HCC)     Dementia (HCC)     Diastolic CHF, chronic (HCC)     GERD (gastroesophageal reflux disease)     Hyperlipidemia     Hyperlipidemia     Hypertension     Hypothyroidism     Migraine     PAD (peripheral artery disease) (HCC)     Renal disorder     Seizures ([facility])     Stroke [facility])      Past Surgical History:   Procedure Laterality Date    APPENDECTOMY      ARTERIOGRAM N/A 2018    Procedure: ARTERIOGRAM WITH STENT PLACEMENT;  Surgeon: Bonnie Reddy MD;  Location: [facility];  Service: Vascular    BLADDER TUMOR EXCISION     [address]  clip right frontal lobe    CARDIAC SURGERY      COLONOSCOPY      CORONARY STENT PLACEMENT      5 stents    EYE SURGERY      IR AORTAGRAM WITH RUN-OFF  2018    MANDIBLE FRACTURE SURGERY      TONSILLECTOMY      TUBAL LIGATION      UTERINE FIBROID SURGERY       Social History   Social History     Substance and Sexual Activity   Alcohol Use Not Currently    Alcohol/week: 0 0 standard drinks     @DRUGHX  E-Cigarette/Vaping    E-Cigarette Use Never User      E-Cigarette/Vaping Substances    Nicotine No     THC No     CBD No     Flavoring No     Other No     Unknown No      Social History     Tobacco Use   Smoking Status Former Smoker    Packs/day: 5 00    Years: 40 00    Pack years: 200 00    Quit date: 2007    Years since quittin 7   Smokeless Tobacco Never Used     Family History:   Family History   Problem Relation Age of Onset    Heart disease Father     Parkinsonism Father     Macular degeneration Mother     Glaucoma Mother     Diabetes Brother     Heart disease Brother        Meds/Allergies   all current active meds have been reviewed  Allergies   Allergen Reactions    Dye [Iodinated Diagnostic Agents] Other (See Comments)     Affects kidneys    Spiriva Handihaler [Tiotropium Bromide Monohydrate] Swelling    Ramipril Facial Swelling    Penicillins      unsure       Objective   Vitals: Blood pressure 135/73, pulse 58, temperature 97 8 °F (36 6 °C), temperature source Oral, resp  rate 16, height 5' 2" (1 575 m), weight 74 4 kg (164 lb), SpO2 97 %  No intake/output data recorded  Invasive Devices     Peripheral Intravenous Line            Peripheral IV 06/18/21 Right Antecubital <1 day          Drain            Urethral Catheter Three way 16 Fr  <1 day                Physical Exam  Constitutional:       General: She is not in acute distress  Appearance: Normal appearance  She is not ill-appearing  HENT:      Head: Normocephalic and atraumatic  Right Ear: External ear normal       Left Ear: External ear normal       Nose: Nose normal       Mouth/Throat:      Mouth: Mucous membranes are moist    Eyes:      Conjunctiva/sclera: Conjunctivae normal    Pulmonary:      Effort: Pulmonary effort is normal    Abdominal:      General: Abdomen is flat  Genitourinary:     Comments: Davis in place  Hematuria noted  Musculoskeletal:         General: No deformity  Cervical back: Neck supple  Skin:     General: Skin is warm and dry  Neurological:      General: No focal deficit present  Mental Status: She is alert  Lab Results:   I have personally reviewed pertinent reports      CBC:   Lab Results   Component Value Date    WBC 6 54 06/18/2021    HGB 12 5 06/18/2021    HCT 38 5 06/18/2021     (H) 06/18/2021     06/18/2021    MCH 34 4 (H) 06/18/2021    MCHC 32 5 06/18/2021    RDW 13 0 06/18/2021    MPV 11 7 06/18/2021    NRBC 0 06/18/2021     CMP:   Lab Results   Component Value Date    SODIUM 147 (H) 06/18/2021     06/18/2021    CO2 35 (H) 06/18/2021    BUN 32 (H) 06/18/2021    CREATININE 1 56 (H) 06/18/2021    CALCIUM 9 4 06/18/2021    AST 18 06/18/2021    ALT 25 06/18/2021    ALKPHOS 85 06/18/2021    EGFR 32 06/18/2021     Imaging Studies: I have personally reviewed pertinent reports  EKG, Pathology, and Other Studies: I have personally reviewed pertinent reports      VTE Prophylaxis: Sequential compression device Bryan Clarke     Code Status: Level 3 - DNAR and DNI  Advance Directive and Living Will: Yes    Power of :    POLST:

## 2021-06-20 ENCOUNTER — TELEPHONE (OUTPATIENT)
Dept: UROLOGY | Facility: CLINIC | Age: 76
End: 2021-06-20

## 2021-06-20 LAB
ANION GAP SERPL CALCULATED.3IONS-SCNC: 3 MMOL/L (ref 4–13)
BASOPHILS # BLD AUTO: 0.03 THOUSANDS/ΜL (ref 0–0.1)
BASOPHILS NFR BLD AUTO: 1 % (ref 0–1)
BUN SERPL-MCNC: 33 MG/DL (ref 5–25)
CALCIUM SERPL-MCNC: 8.8 MG/DL (ref 8.3–10.1)
CHLORIDE SERPL-SCNC: 115 MMOL/L (ref 100–108)
CO2 SERPL-SCNC: 31 MMOL/L (ref 21–32)
CREAT SERPL-MCNC: 1.34 MG/DL (ref 0.6–1.3)
EOSINOPHIL # BLD AUTO: 0.21 THOUSAND/ΜL (ref 0–0.61)
EOSINOPHIL NFR BLD AUTO: 3 % (ref 0–6)
ERYTHROCYTE [DISTWIDTH] IN BLOOD BY AUTOMATED COUNT: 13.3 % (ref 11.6–15.1)
GFR SERPL CREATININE-BSD FRML MDRD: 39 ML/MIN/1.73SQ M
GLUCOSE SERPL-MCNC: 132 MG/DL (ref 65–140)
HCT VFR BLD AUTO: 28.1 % (ref 34.8–46.1)
HGB BLD-MCNC: 8.8 G/DL (ref 11.5–15.4)
IMM GRANULOCYTES # BLD AUTO: 0.05 THOUSAND/UL (ref 0–0.2)
IMM GRANULOCYTES NFR BLD AUTO: 1 % (ref 0–2)
LYMPHOCYTES # BLD AUTO: 2.17 THOUSANDS/ΜL (ref 0.6–4.47)
LYMPHOCYTES NFR BLD AUTO: 35 % (ref 14–44)
MCH RBC QN AUTO: 34.1 PG (ref 26.8–34.3)
MCHC RBC AUTO-ENTMCNC: 31.3 G/DL (ref 31.4–37.4)
MCV RBC AUTO: 109 FL (ref 82–98)
MONOCYTES # BLD AUTO: 0.57 THOUSAND/ΜL (ref 0.17–1.22)
MONOCYTES NFR BLD AUTO: 9 % (ref 4–12)
NEUTROPHILS # BLD AUTO: 3.21 THOUSANDS/ΜL (ref 1.85–7.62)
NEUTS SEG NFR BLD AUTO: 51 % (ref 43–75)
NRBC BLD AUTO-RTO: 0 /100 WBCS
PLATELET # BLD AUTO: 151 THOUSANDS/UL (ref 149–390)
PMV BLD AUTO: 11.9 FL (ref 8.9–12.7)
POTASSIUM SERPL-SCNC: 4 MMOL/L (ref 3.5–5.3)
RBC # BLD AUTO: 2.58 MILLION/UL (ref 3.81–5.12)
SODIUM SERPL-SCNC: 149 MMOL/L (ref 136–145)
WBC # BLD AUTO: 6.24 THOUSAND/UL (ref 4.31–10.16)

## 2021-06-20 PROCEDURE — 80048 BASIC METABOLIC PNL TOTAL CA: CPT | Performed by: UROLOGY

## 2021-06-20 PROCEDURE — 99232 SBSQ HOSP IP/OBS MODERATE 35: CPT | Performed by: UROLOGY

## 2021-06-20 PROCEDURE — 99233 SBSQ HOSP IP/OBS HIGH 50: CPT | Performed by: INTERNAL MEDICINE

## 2021-06-20 PROCEDURE — 85025 COMPLETE CBC W/AUTO DIFF WBC: CPT | Performed by: UROLOGY

## 2021-06-20 RX ADMIN — ATORVASTATIN CALCIUM 10 MG: 10 TABLET, FILM COATED ORAL at 17:48

## 2021-06-20 RX ADMIN — FERROUS SULFATE TAB 325 MG (65 MG ELEMENTAL FE) 325 MG: 325 (65 FE) TAB at 17:48

## 2021-06-20 RX ADMIN — DIVALPROEX SODIUM 250 MG: 250 TABLET, DELAYED RELEASE ORAL at 22:18

## 2021-06-20 RX ADMIN — CARBAMAZEPINE 100 MG: 100 TABLET, CHEWABLE ORAL at 17:48

## 2021-06-20 RX ADMIN — CARBAMAZEPINE 100 MG: 100 TABLET, CHEWABLE ORAL at 08:54

## 2021-06-20 RX ADMIN — DIVALPROEX SODIUM 250 MG: 250 TABLET, DELAYED RELEASE ORAL at 08:58

## 2021-06-20 RX ADMIN — PHENAZOPYRIDINE 100 MG: 100 TABLET ORAL at 11:56

## 2021-06-20 RX ADMIN — LORAZEPAM 0.5 MG: 0.5 TABLET ORAL at 22:18

## 2021-06-20 RX ADMIN — PHENAZOPYRIDINE 100 MG: 100 TABLET ORAL at 05:53

## 2021-06-20 RX ADMIN — BUPROPION HYDROCHLORIDE 150 MG: 150 TABLET, FILM COATED, EXTENDED RELEASE ORAL at 09:03

## 2021-06-20 RX ADMIN — FUROSEMIDE 20 MG: 20 TABLET ORAL at 08:53

## 2021-06-20 RX ADMIN — POTASSIUM CHLORIDE 20 MEQ: 1500 TABLET, EXTENDED RELEASE ORAL at 08:56

## 2021-06-20 RX ADMIN — METOPROLOL TARTRATE 100 MG: 50 TABLET, FILM COATED ORAL at 08:54

## 2021-06-20 RX ADMIN — LEVOTHYROXINE SODIUM 100 MCG: 100 TABLET ORAL at 05:53

## 2021-06-20 RX ADMIN — PHENAZOPYRIDINE 100 MG: 100 TABLET ORAL at 17:48

## 2021-06-20 RX ADMIN — METOPROLOL TARTRATE 100 MG: 50 TABLET, FILM COATED ORAL at 22:18

## 2021-06-20 RX ADMIN — DIGOXIN 125 MCG: 125 TABLET ORAL at 08:53

## 2021-06-20 RX ADMIN — CARBAMAZEPINE 100 MG: 100 TABLET, CHEWABLE ORAL at 22:18

## 2021-06-20 NOTE — PROGRESS NOTES
[facility]  Progress Note Carolee Ramirez 68[birth year], 77 y o  female MRN: [id number]  Unit/Bed#: [facility] 931-01 Encounter: [id number]  Primary Care Provider: Deneen Coffey DO   Date and time admitted to hospital: 2021 11:42 PM    * Gross hematuria  Assessment & Plan  · CBI still bleeding so, s/p cysto  Bladder mass biopsy pending  Bleeding stopped this morning   · Urology following, recommend to continue with CBI and clamp at 6 pm    · Regular diet  · No signs of sepsis  · F/u U Cx    Coronary artery disease involving native coronary artery of native heart without angina pectoris  Assessment & Plan  · Hold ASA for now  · C/w BB    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  Assessment & Plan  · C/w Depakote and Wellbutrin    Paroxysmal atrial flutter (HCC)  Assessment & Plan  · Hold ELiquis  · C/w BB and digoxin           VTE Pharmacologic Prophylaxis:   Pharmacologic: Pharmacologic VTE Prophylaxis contraindicated due to due to hematuria  Mechanical VTE Prophylaxis in Place: Yes    Patient Centered Rounds: I have performed bedside rounds with nursing staff today  Discussions with Specialists or Other Care Team Provider: urology     Education and Discussions with Family / Patient: patient     Time Spent for Care: 45 minutes  More than 50% of total time spent on counseling and coordination of care as described above  Current Length of Stay: 1 day(s)    Current Patient Status: Inpatient   Certification Statement: The patient will continue to require additional inpatient hospital stay due to CBI and possible tomorrow, needs PT/OT, daughter might not be happy about the facility patient is at  Discharge Plan: pending above     Code Status: Level 3 - DNAR and DNI      Subjective:   Patient was eating in bed, no complaints  She is having clear urine in bag  CBI running  Fluid running       Objective:     Vitals:   Temp (24hrs), Av 7 °F (36 5 °C), Min:96 5 °F (35 8 °C), Max:98 6 °F (37 °C)    Temp:  [96 5 °F (35 8 °C)-98 6 °F (37 °C)] 98 1 °F (36 7 °C)  HR:  [64-77] 72  Resp:  [12-18] 18  BP: (107-147)/(54-74) 123/60  SpO2:  [90 %-100 %] 92 %  Body mass index is 30 kg/m²  Input and Output Summary (last 24 hours): Intake/Output Summary (Last 24 hours) at 6/20/2021 1259  Last data filed at 6/20/2021 1200  Gross per 24 hour   Intake 250 ml   Output 85501 ml   Net -19600 ml       Physical Exam:     Physical Exam  Vitals and nursing note reviewed  Constitutional:       Appearance: Normal appearance  HENT:      Head: Normocephalic and atraumatic  Nose: Nose normal  No congestion or rhinorrhea  Eyes:      General:         Right eye: No discharge  Left eye: No discharge  Extraocular Movements: Extraocular movements intact  Conjunctiva/sclera: Conjunctivae normal       Pupils: Pupils are equal, round, and reactive to light  Neck:      Vascular: No carotid bruit  Cardiovascular:      Rate and Rhythm: Normal rate and regular rhythm  Pulses: Normal pulses  Heart sounds: Normal heart sounds  Pulmonary:      Effort: Pulmonary effort is normal  No respiratory distress  Breath sounds: Normal breath sounds  No stridor  No wheezing or rhonchi  Abdominal:      General: Abdomen is flat  Bowel sounds are normal  There is no distension  Palpations: Abdomen is soft  There is no mass  Tenderness: There is no abdominal tenderness  Hernia: No hernia is present  Musculoskeletal:         General: No swelling, tenderness, deformity or signs of injury  Normal range of motion  Cervical back: Normal range of motion and neck supple  No rigidity or tenderness  Right lower leg: No edema  Left lower leg: No edema  Lymphadenopathy:      Cervical: No cervical adenopathy  Skin:     General: Skin is warm and dry  Coloration: Skin is not jaundiced or pale        Findings: No bruising, erythema, lesion or rash    Neurological:      General: No focal deficit present  Mental Status: She is alert and oriented to person, place, and time  Cranial Nerves: No cranial nerve deficit  Sensory: No sensory deficit  Motor: No weakness  Coordination: Coordination normal    Psychiatric:         Mood and Affect: Mood normal          Behavior: Behavior normal          Thought Content: Thought content normal          Judgment: Judgment normal          Additional Data:     Labs:    Results from last 7 days   Lab Units 06/20/21  0646   WBC Thousand/uL 6 24   HEMOGLOBIN g/dL 8 8*   HEMATOCRIT % 28 1*   PLATELETS Thousands/uL 151   NEUTROS PCT % 51   LYMPHS PCT % 35   MONOS PCT % 9   EOS PCT % 3     Results from last 7 days   Lab Units 06/20/21  0646 06/19/21  0738   POTASSIUM mmol/L 4 0 3 9   CHLORIDE mmol/L 115* 113*   CO2 mmol/L 31 33*   BUN mg/dL 33* 37*   CREATININE mg/dL 1 34* 1 56*   CALCIUM mg/dL 8 8 9 4   ALK PHOS U/L  --  77   ALT U/L  --  21   AST U/L  --  14     Results from last 7 days   Lab Units 06/19/21  0738   INR  1 19       * I Have Reviewed All Lab Data Listed Above  * Additional Pertinent Lab Tests Reviewed:  [facility]    Imaging:    Imaging Reports Reviewed Today Include:    Imaging Personally Reviewed by Myself Includes:      Recent Cultures (last 7 days):     Results from last 7 days   Lab Units 06/18/21  1746   URINE CULTURE  <10,000 cfu/ml        Last 24 Hours Medication List:   Current Facility-Administered Medications   Medication Dose Route Frequency Provider Last Rate    acetaminophen  650 mg Oral Q8H PRN Elieser Sifuentes MD      albuterol  2 puff Inhalation Q6H PRN Elieser Sifuentes MD      atorvastatin  10 mg Oral After Dominique Bloom MD      belladonna-opium  30 mg Rectal Q8H PRN Elieser Sifuentes MD      bisacodyl  10 mg Rectal Daily PRN Elieser Sifuentes MD      buPROPion  150 mg Oral Daily Lowella Mention Amrita Reeves MD      carBAMazepine  100 mg Oral TID Ioana Baig MD      cholecalciferol  2,000 Units Oral Daily Ioana Baig MD      digoxin  125 mcg Oral Daily Ioana Baig MD      divalproex sodium  250 mg Oral Q12H [address] Ioana Baig MD      famotidine  20 mg Oral Daily Ioana Baig MD      fenofibrate  145 mg Oral Daily Ioana Baig MD      ferrous sulfate  325 mg Oral BID Ioana Baig MD      furosemide  20 mg Oral Daily Ioana Baig MD      haloperidol  1 mg Oral Q8H PRN Ioana Baig MD      HYDROmorphone  2 mg Oral Q4H PRN Ioana Baig MD      hyoscyamine  0 125 mg Sublingual Q4H PRN Ioana Baig MD      lactated ringers  125 mL/hr Intravenous Continuous Ioana Baig MD Stopped (06/19/21 2108)    levothyroxine  100 mcg Oral Early Morning Ioana Baig MD      LORazepam  0 5 mg Oral HS Ioana Baig MD      metoprolol tartrate  100 mg Oral Q12H [address] Ioana Baig MD      nitroglycerin  0 4 mg Sublingual Q5 Min PRN Ioana Baig MD      ondansetron  4 mg Intravenous Q6H PRN Ioana Baig MD      phenazopyridine  100 mg Oral TID With Meals Ioana Baig MD      polyethylene glycol  17 g Oral Daily PRN Ioana Baig MD      potassium chloride  20 mEq Oral Daily Ioana Baig MD      senna  1 tablet Oral Daily Ioana Baig MD          Today, Patient Was Seen By: Deisi León MD    ** Please Note: Dictation voice to text software may have been used in the creation of this document   **

## 2021-06-20 NOTE — ASSESSMENT & PLAN NOTE
· CBI still bleeding so, s/p cysto  Bladder mass biopsy pending   Bleeding stopped this morning   · Urology following, recommend to continue with CBI and clamp at 6 pm    · Regular diet  · No signs of sepsis  · F/u U Cx

## 2021-06-20 NOTE — PROGRESS NOTES
UROLOGY PROGRESS NOTE   Patient Identifiers: Linda Eason (MRN [id number])  Date of Service: 6/20/2021        Assessment:     1  Gross hematuria with clot urinary retention  - markedly improved status post cystoscopy with biopsy fulguration    2  Neurogenic bladder chronic indwelling Davis catheter    3  Bladder tumor (new problem)  -status post cystoscopic biopsy    Patient is looking well this morning  Her hematuria appears to have largely resolved    Plan:   - continue to wean CBI through the day, anticipate clamping at 6:00 p m  Tonight  -recommend continuing to hold anticoagulation and anti-platelet therapy for an additional 5 days status post surgery  -findings at the time of surgery discussed with the patient's daughter by phone  A biopsy was submitted from her newly identified bladder tumor  The tumor was not resected due to her active anticoagulation  -I anticipate the patient will require trans urethral resection of the bladder tumor  This can safely be scheduled as an outpatient procedure after obtaining medical clearance and permission to discontinue her anticoagulation  - I will follow-up on the biopsy and schedule her next intervention accordingly  - urology will continue to follow along        Subjective:     24 HR EVENTS:   no significant events  Patient has  no complaints        Objective:     VITALS:    Vitals:    06/20/21 0743   BP: 133/64   Pulse: 68   Resp: 18   Temp: 98 1 °F (36 7 °C)   SpO2: 94%       INS & OUTS:  [unfilled]    LABS:  Lab Results   Component Value Date    HGB 8 8 (L) 06/20/2021    HCT 28 1 (L) 06/20/2021    WBC 6 24 06/20/2021     06/20/2021   ]    Lab Results   Component Value Date    K 3 9 06/19/2021     (H) 06/19/2021    CO2 33 (H) 06/19/2021    BUN 37 (H) 06/19/2021    CREATININE 1 56 (H) 06/19/2021    CALCIUM 9 4 06/19/2021   ]    INPATIENT MEDS:    Current Facility-Administered Medications:     acetaminophen (TYLENOL) tablet 650 mg, 650 mg, Oral, Q8H PRN, Louisa Daniel MD    albuterol (PROVENTIL HFA,VENTOLIN HFA) inhaler 2 puff, 2 puff, Inhalation, Q6H PRN, Louisa Daniel MD    atorvastatin (LIPITOR) tablet 10 mg, 10 mg, Oral, After Cyrena Merlin, MD    belladonna-opium (B&O SUPPOSITORY) 16 2-30 mg suppository 1 suppository, 30 mg, Rectal, Q8H PRN, Louisa Daniel MD    bisacodyl (DULCOLAX) rectal suppository 10 mg, 10 mg, Rectal, Daily PRN, Louisa Daniel MD    buPROPion (WELLBUTRIN XL) 24 hr tablet 150 mg, 150 mg, Oral, Daily, Louisa Daniel MD    carBAMazepine (TEGretol) chewable tablet 100 mg, 100 mg, Oral, TID, Louisa Daniel MD, 100 mg at 06/19/21 2325    cholecalciferol (VITAMIN D3) tablet 2,000 Units, 2,000 Units, Oral, Daily, Louisa Daniel MD    digoxin (LANOXIN) tablet 125 mcg, 125 mcg, Oral, Daily, Louisa Daniel MD    divalproex sodium (DEPAKOTE) EC tablet 250 mg, 250 mg, Oral, Q12H [address], Louisa Daniel MD, 250 mg at 06/19/21 2325    famotidine (PEPCID) tablet 20 mg, 20 mg, Oral, Daily, Louisa Daniel MD    fenofibrate (TRICOR) tablet 145 mg, 145 mg, Oral, Daily, Louisa Daniel MD    ferrous sulfate tablet 325 mg, 325 mg, Oral, BID, Louisa Daniel MD    furosemide (LASIX) tablet 20 mg, 20 mg, Oral, Daily, Louisa Daniel MD    haloperidol (HALDOL) tablet 1 mg, 1 mg, Oral, Q8H PRN, Louisa Dainel MD    HYDROmorphone (DILAUDID) tablet 2 mg, 2 mg, Oral, Q4H PRN, Louisa Daniel MD    hyoscyamine (LEVSIN/SL) SL tablet 0 125 mg, 0 125 mg, Sublingual, Q4H PRN, Louisa Daniel MD    lactated ringers infusion, 125 mL/hr, Intravenous, Continuous, Louisa Daniel MD, Stopped at 06/19/21 2108    levothyroxine tablet 100 mcg, 100 mcg, Oral, Early Morning, Louisa Daniel MD, 100 mcg at 06/20/21 0546    LORazepam (ATIVAN) tablet 0 5 mg, 0 5 mg, Oral, HS, Louisa Daniel MD, 0 5 mg at 06/19/21 6236   metoprolol tartrate (LOPRESSOR) tablet 100 mg, 100 mg, Oral, Q12H [address], Jaylen Stone MD, 100 mg at 06/19/21 2325    nitroglycerin (NITROSTAT) SL tablet 0 4 mg, 0 4 mg, Sublingual, Q5 Min PRN, Jaylen Stone MD    ondansetron [facility]) injection 4 mg, 4 mg, Intravenous, Q6H PRN, Jaylen Stone MD    phenazopyridine (PYRIDIUM) tablet 100 mg, 100 mg, Oral, TID With Meals, Jaylen Stone MD, 100 mg at 06/20/21 0553    polyethylene glycol (MIRALAX) packet 17 g, 17 g, Oral, Daily PRN, Jaylen Stone MD    potassium chloride (K-DUR,KLOR-CON) CR tablet 20 mEq, 20 mEq, Oral, Daily, Jaylen Stone MD    senna (SENOKOT) tablet 8 6 mg, 1 tablet, Oral, Daily, Jaylen Stone MD      Physical Exam:   /64   Pulse 68   Temp 98 1 °F (36 7 °C)   Resp 18   Ht 5' 2" (1 575 m)   Wt 74 4 kg (164 lb)   SpO2 94%   BMI 30 00 kg/m²   GEN: resting comfortably  RESP: breathing comfortably with no accessory muscle use  CV: no significant peripheral edema  ABD: soft and appropriately tender to palpation  INCISION: none  DRAINS: none  BLUE: in place draining clear yellow urine    Slow CBI

## 2021-06-20 NOTE — ANESTHESIA POSTPROCEDURE EVALUATION
Post-Op Assessment Note    CV Status:  Stable  Pain Score: 0    Pain management: adequate     Mental Status:  Alert and awake   Hydration Status:  Euvolemic   PONV Controlled:  Controlled   Airway Patency:  Patent      Post Op Vitals Reviewed: Yes      Staff: CRNA         No complications documented      BP   141/54   Temp   96 5   Pulse  65   Resp   16   SpO2   100

## 2021-06-21 PROBLEM — D62 ACUTE BLOOD LOSS ANEMIA: Status: ACTIVE | Noted: 2021-06-21

## 2021-06-21 LAB
ANION GAP SERPL CALCULATED.3IONS-SCNC: 4 MMOL/L (ref 4–13)
BASOPHILS # BLD MANUAL: 0.12 THOUSAND/UL (ref 0–0.1)
BASOPHILS NFR MAR MANUAL: 2 % (ref 0–1)
BUN SERPL-MCNC: 31 MG/DL (ref 5–25)
CALCIUM SERPL-MCNC: 9 MG/DL (ref 8.3–10.1)
CHLORIDE SERPL-SCNC: 119 MMOL/L (ref 100–108)
CO2 SERPL-SCNC: 29 MMOL/L (ref 21–32)
CREAT SERPL-MCNC: 1.39 MG/DL (ref 0.6–1.3)
EOSINOPHIL # BLD MANUAL: 0.41 THOUSAND/UL (ref 0–0.4)
EOSINOPHIL NFR BLD MANUAL: 7 % (ref 0–6)
ERYTHROCYTE [DISTWIDTH] IN BLOOD BY AUTOMATED COUNT: 13.3 % (ref 11.6–15.1)
GFR SERPL CREATININE-BSD FRML MDRD: 37 ML/MIN/1.73SQ M
GLUCOSE SERPL-MCNC: 127 MG/DL (ref 65–140)
HCT VFR BLD AUTO: 26.3 % (ref 34.8–46.1)
HGB BLD-MCNC: 8.3 G/DL (ref 11.5–15.4)
LYMPHOCYTES # BLD AUTO: 2.18 THOUSAND/UL (ref 0.6–4.47)
LYMPHOCYTES # BLD AUTO: 37 % (ref 14–44)
MCH RBC QN AUTO: 34.9 PG (ref 26.8–34.3)
MCHC RBC AUTO-ENTMCNC: 31.6 G/DL (ref 31.4–37.4)
MCV RBC AUTO: 111 FL (ref 82–98)
MONOCYTES # BLD AUTO: 0.24 THOUSAND/UL (ref 0–1.22)
MONOCYTES NFR BLD: 4 % (ref 4–12)
MYELOCYTES NFR BLD MANUAL: 1 % (ref 0–1)
NEUTROPHILS # BLD MANUAL: 2.88 THOUSAND/UL (ref 1.85–7.62)
NEUTS SEG NFR BLD AUTO: 49 % (ref 43–75)
NRBC BLD AUTO-RTO: 0 /100 WBCS
PLATELET # BLD AUTO: 132 THOUSANDS/UL (ref 149–390)
PLATELET BLD QL SMEAR: ABNORMAL
PMV BLD AUTO: 11.9 FL (ref 8.9–12.7)
POTASSIUM SERPL-SCNC: 4 MMOL/L (ref 3.5–5.3)
RBC # BLD AUTO: 2.38 MILLION/UL (ref 3.81–5.12)
RBC MORPH BLD: NORMAL
SODIUM SERPL-SCNC: 152 MMOL/L (ref 136–145)
T4 FREE SERPL-MCNC: 0.78 NG/DL (ref 0.76–1.46)
WBC # BLD AUTO: 5.88 THOUSAND/UL (ref 4.31–10.16)

## 2021-06-21 PROCEDURE — 99233 SBSQ HOSP IP/OBS HIGH 50: CPT | Performed by: INTERNAL MEDICINE

## 2021-06-21 PROCEDURE — 99232 SBSQ HOSP IP/OBS MODERATE 35: CPT | Performed by: PHYSICIAN ASSISTANT

## 2021-06-21 PROCEDURE — 85007 BL SMEAR W/DIFF WBC COUNT: CPT | Performed by: INTERNAL MEDICINE

## 2021-06-21 PROCEDURE — 80048 BASIC METABOLIC PNL TOTAL CA: CPT | Performed by: INTERNAL MEDICINE

## 2021-06-21 PROCEDURE — 99222 1ST HOSP IP/OBS MODERATE 55: CPT | Performed by: INTERNAL MEDICINE

## 2021-06-21 PROCEDURE — 85027 COMPLETE CBC AUTOMATED: CPT | Performed by: INTERNAL MEDICINE

## 2021-06-21 PROCEDURE — 84439 ASSAY OF FREE THYROXINE: CPT | Performed by: STUDENT IN AN ORGANIZED HEALTH CARE EDUCATION/TRAINING PROGRAM

## 2021-06-21 RX ORDER — LEVOTHYROXINE SODIUM 112 UG/1
112 TABLET ORAL
Status: DISCONTINUED | OUTPATIENT
Start: 2021-06-22 | End: 2021-06-25 | Stop reason: HOSPADM

## 2021-06-21 RX ORDER — DEXTROSE MONOHYDRATE 100 MG/ML
50 INJECTION, SOLUTION INTRAVENOUS CONTINUOUS
Status: DISCONTINUED | OUTPATIENT
Start: 2021-06-21 | End: 2021-06-21

## 2021-06-21 RX ADMIN — PHENAZOPYRIDINE 100 MG: 100 TABLET ORAL at 11:58

## 2021-06-21 RX ADMIN — CARBAMAZEPINE 100 MG: 100 TABLET, CHEWABLE ORAL at 16:15

## 2021-06-21 RX ADMIN — Medication 2000 UNITS: at 08:43

## 2021-06-21 RX ADMIN — DEXTROSE 50 ML/HR: 10 SOLUTION INTRAVENOUS at 08:35

## 2021-06-21 RX ADMIN — LORAZEPAM 0.5 MG: 0.5 TABLET ORAL at 21:30

## 2021-06-21 RX ADMIN — METOPROLOL TARTRATE 100 MG: 50 TABLET, FILM COATED ORAL at 08:39

## 2021-06-21 RX ADMIN — ATORVASTATIN CALCIUM 10 MG: 10 TABLET, FILM COATED ORAL at 18:26

## 2021-06-21 RX ADMIN — CARBAMAZEPINE 100 MG: 100 TABLET, CHEWABLE ORAL at 08:40

## 2021-06-21 RX ADMIN — DIVALPROEX SODIUM 250 MG: 250 TABLET, DELAYED RELEASE ORAL at 21:30

## 2021-06-21 RX ADMIN — FERROUS SULFATE TAB 325 MG (65 MG ELEMENTAL FE) 325 MG: 325 (65 FE) TAB at 08:40

## 2021-06-21 RX ADMIN — FUROSEMIDE 20 MG: 20 TABLET ORAL at 08:40

## 2021-06-21 RX ADMIN — CARBAMAZEPINE 100 MG: 100 TABLET, CHEWABLE ORAL at 21:30

## 2021-06-21 RX ADMIN — BUPROPION HYDROCHLORIDE 150 MG: 150 TABLET, FILM COATED, EXTENDED RELEASE ORAL at 08:46

## 2021-06-21 RX ADMIN — DIVALPROEX SODIUM 250 MG: 250 TABLET, DELAYED RELEASE ORAL at 08:46

## 2021-06-21 RX ADMIN — PHENAZOPYRIDINE 100 MG: 100 TABLET ORAL at 08:40

## 2021-06-21 RX ADMIN — METOPROLOL TARTRATE 100 MG: 50 TABLET, FILM COATED ORAL at 21:30

## 2021-06-21 RX ADMIN — FERROUS SULFATE TAB 325 MG (65 MG ELEMENTAL FE) 325 MG: 325 (65 FE) TAB at 18:26

## 2021-06-21 RX ADMIN — PHENAZOPYRIDINE 100 MG: 100 TABLET ORAL at 16:15

## 2021-06-21 RX ADMIN — POTASSIUM CHLORIDE 20 MEQ: 1500 TABLET, EXTENDED RELEASE ORAL at 08:45

## 2021-06-21 RX ADMIN — FAMOTIDINE 20 MG: 20 TABLET ORAL at 08:49

## 2021-06-21 RX ADMIN — DIGOXIN 125 MCG: 125 TABLET ORAL at 08:43

## 2021-06-21 RX ADMIN — LEVOTHYROXINE SODIUM 100 MCG: 100 TABLET ORAL at 06:32

## 2021-06-21 RX ADMIN — FENOFIBRATE 145 MG: 145 TABLET, FILM COATED ORAL at 08:47

## 2021-06-21 NOTE — PLAN OF CARE
Problem: MOBILITY - ADULT  Goal: Maintain or return to baseline ADL function  Description: INTERVENTIONS:  -  Assess patient's ability to carry out ADLs; assess patient's baseline for ADL function and identify physical deficits which impact ability to perform ADLs (bathing, care of mouth/teeth, toileting, grooming, dressing, etc )  - Assess/evaluate cause of self-care deficits   - Assess range of motion  - Assess patient's mobility; develop plan if impaired  - Assess patient's need for assistive devices and provide as appropriate  - Encourage maximum independence but intervene and supervise when necessary  - Involve family in performance of ADLs  - Assess for home care needs following discharge   - Consider OT consult to assist with ADL evaluation and planning for discharge  - Provide patient education as appropriate  Outcome: Progressing  Goal: Maintains/Returns to pre admission functional level  Description: INTERVENTIONS:  - Perform BMAT or MOVE assessment daily    - Set and communicate daily mobility goal to care team and patient/family/caregiver  - Collaborate with rehabilitation services on mobility goals if consulted  - Perform Range of Motion times a day  - Reposition patient every hours    - Dangle patient times a day  - Stand patient times a day  - Ambulate patient times a day  - Out of bed to chair times a day   - Out of bed for meals  times a day  - Out of bed for toileting  - Record patient progress and toleration of activity level   Outcome: Progressing     Problem: Potential for Falls  Goal: Patient will remain free of falls  Description: INTERVENTIONS:  - Educate patient/family on patient safety including physical limitations  - Instruct patient to call for assistance with activity   - Consult OT/PT to assist with strengthening/mobility   - Keep Call bell within reach  - Keep bed low and locked with side rails adjusted as appropriate  - Keep care items and personal belongings within reach  - Initiate and maintain comfort rounds  - Make Fall Risk Sign visible to staff  - Offer Toileting every  Hours, in advance of need  - Initiate/Maintain alarm  - Obtain necessary fall risk management equipment:   - Apply yellow socks and bracelet for high fall risk patients  - Consider moving patient to room near nurses station  Outcome: Progressing     Problem: Prexisting or High Potential for Compromised Skin Integrity  Goal: Skin integrity is maintained or improved  Description: INTERVENTIONS:  - Identify patients at risk for skin breakdown  - Assess and monitor skin integrity  - Assess and monitor nutrition and hydration status  - Monitor labs   - Assess for incontinence   - Turn and reposition patient  - Assist with mobility/ambulation  - Relieve pressure over bony prominences  - Avoid friction and shearing  - Provide appropriate hygiene as needed including keeping skin clean and dry  - Evaluate need for skin moisturizer/barrier cream  - Collaborate with interdisciplinary team   - Patient/family teaching  - Consider wound care consult   Outcome: Progressing

## 2021-06-21 NOTE — TELEPHONE ENCOUNTER
New pt s/p bladder biopsy    Patient will require a TURBT at [facility] please pencil in a date  WHen I get her biopsy result back, I will call her daughter and notify you to finalize the plan  Pt has dementia and lives in a facility  She will need medical clearance prior, including permission to hold blood thinners      Thank you

## 2021-06-21 NOTE — PROGRESS NOTES
[address] 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  Assessment & Plan  · C/w Depakote and Wellbutrin    Paroxysmal atrial flutter (HCC)  Assessment & Plan  · Hold ELiquis  · C/w BB and digoxin           VTE Pharmacologic Prophylaxis: VTE Score: 6 not on it due to hematuria    Patient Centered Rounds: I performed bedside rounds with nursing staff today  Discussions with Specialists or Other Care Team Provider: urology     Education and Discussions with Family / Patient: tried calling no answer   Time Spent for Care: 60 minutes  More than 50% of total time spent on counseling and coordination of care as described above  Current Length of Stay: 2 day(s)  Current Patient Status: Inpatient   Certification Statement: The patient will continue to require additional inpatient hospital stay due to urology needs to clear patient  Discharge Plan: Anticipate discharge tomorrow to rehab facility  Code Status: Level 3 - DNAR and DNI    Subjective:   Patient is doing well  No pain  Still has blood tinged urine  Objective:     Vitals:   Temp (24hrs), Av 7 °F (37 1 °C), Min:97 8 °F (36 6 °C), Max:99 2 °F (37 3 °C)    Temp:  [97 8 °F (36 6 °C)-99 2 °F (37 3 °C)] 97 8 °F (36 6 °C)  HR:  [54-63] 55  Resp:  [16-18] 18  BP: (112-126)/(52-54) 113/54  SpO2:  [90 %-91 %] 90 %  Body mass index is 30 kg/m²       Input and Output Summary (last 24 hours): Intake/Output Summary (Last 24 hours) at 6/21/2021 0957  Last data filed at 6/21/2021 0900  Gross per 24 hour   Intake 420 ml   Output 1425 ml   Net -1005 ml       Physical Exam:   Physical Exam  Vitals and nursing note reviewed  Constitutional:       Appearance: Normal appearance  HENT:      Head: Normocephalic and atraumatic  Right Ear: There is no impacted cerumen  Left Ear: There is no impacted cerumen  Nose: Nose normal  No congestion or rhinorrhea  Mouth/Throat:      Mouth: Mucous membranes are dry  Pharynx: No oropharyngeal exudate or posterior oropharyngeal erythema  Eyes:      General: No scleral icterus  Right eye: No discharge  Left eye: No discharge  Extraocular Movements: Extraocular movements intact  Conjunctiva/sclera: Conjunctivae normal       Pupils: Pupils are equal, round, and reactive to light  Cardiovascular:      Rate and Rhythm: Normal rate  Pulses: Normal pulses  Heart sounds: Normal heart sounds  Pulmonary:      Effort: Pulmonary effort is normal       Breath sounds: Normal breath sounds  Abdominal:      General: Abdomen is flat  Bowel sounds are normal  There is no distension  Palpations: Abdomen is soft  There is no mass  Tenderness: There is no abdominal tenderness  There is no right CVA tenderness, guarding or rebound  Hernia: No hernia is present  Skin:     General: Skin is warm and dry  Coloration: Skin is not jaundiced or pale  Findings: No bruising  Neurological:      General: No focal deficit present  Mental Status: She is alert and oriented to person, place, and time  Mental status is at baseline  Cranial Nerves: No cranial nerve deficit  Sensory: No sensory deficit  Motor: No weakness  Coordination: Coordination normal    Psychiatric:         Mood and Affect: Mood normal          Behavior: Behavior normal          Thought Content:  Thought content normal          Judgment: Judgment normal           Additional Data:     Labs:  Results from last 7 days   Lab Units 06/21/21  0631 06/20/21  0646   WBC Thousand/uL 5 88 6 24   HEMOGLOBIN g/dL 8 3* 8 8*   HEMATOCRIT % 26 3* 28 1*   PLATELETS Thousands/uL 132* 151   NEUTROS PCT %  --  51   LYMPHS PCT %  --  35   MONOS PCT %  --  9   EOS PCT %  --  3     Results from last 7 days   Lab Units 06/21/21  0631 06/19/21  0738   SODIUM mmol/L 152* 148*   POTASSIUM mmol/L 4 0 3 9   CHLORIDE mmol/L 119* 113*   CO2 mmol/L 29 33*   BUN mg/dL 31* 37*   CREATININE mg/dL 1 39* 1 56*   ANION GAP mmol/L 4 2*   CALCIUM mg/dL 9 0 9 4   ALBUMIN g/dL  --  3 1*   TOTAL BILIRUBIN mg/dL  --  0 32   ALK PHOS U/L  --  77   ALT U/L  --  21   AST U/L  --  14   GLUCOSE RANDOM mg/dL 127 159*     Results from last 7 days   Lab Units 06/19/21  0738   INR  1 19                   Lines/Drains:  Invasive Devices     Peripheral Intravenous Line            Peripheral IV 06/18/21 Right Antecubital 2 days          Drain            Continuous Bladder Irrigation Three-way 1 day              Urinary Catheter:  Goal for removal: N/A- Discharging with Davis               Imaging: Reviewed radiology reports from this admission including: abdominal/pelvic CT    Recent Cultures (last 7 days):   Results from last 7 days   Lab Units 06/18/21  1746   URINE CULTURE  <10,000 cfu/ml        Last 24 Hours Medication List:   Current Facility-Administered Medications   Medication Dose Route Frequency Provider Last Rate    acetaminophen  650 mg Oral Q8H PRN Jing Ovalles MD      albuterol  2 puff Inhalation Q6H PRN Jing Ovalles MD      atorvastatin  10 mg Oral After Ramez Yao MD      belladonna-opium  30 mg Rectal Q8H PRN Jing Ovalles MD      bisacodyl  10 mg Rectal Daily PRN Jing Ovalles MD      buPROPion  150 mg Oral Daily Jing Ovalles MD      carBAMazepine  100 mg Oral TID Jing Ovalles MD     [facility] cholecalciferol  2,000 Units Oral Daily Dairl Blanchard, MD      dextrose  50 mL/hr Intravenous Continuous David Licona MD 50 mL/hr (06/21/21 0835)    digoxin  125 mcg Oral Daily Dairl Blanchard, MD      divalproex sodium  250 mg Oral Q12H [address] Dairl Blanchard, MD      famotidine  20 mg Oral Daily Dairl Blanchard, MD      fenofibrate  145 mg Oral Daily Dairl Blanchard, MD      ferrous sulfate  325 mg Oral BID Dairl Blanchard, MD      furosemide  20 mg Oral Daily Dairl Blanchard, MD      haloperidol  1 mg Oral Q8H PRN Dairl Blanchard, MD      HYDROmorphone  2 mg Oral Q4H PRN Dairl Blanchard, MD      hyoscyamine  0 125 mg Sublingual Q4H PRN Dairl Blanchard, MD      levothyroxine  100 mcg Oral Early Morning Dairl Blanchard, MD      LORazepam  0 5 mg Oral HS Dairl Blanchard, MD      metoprolol tartrate  100 mg Oral Q12H [address] Dairl Blanchard, MD      nitroglycerin  0 4 mg Sublingual Q5 Min PRN Dairl Blanchard, MD      ondansetron  4 mg Intravenous Q6H PRN Dairl Blanchard, MD      phenazopyridine  100 mg Oral TID With Meals Chiquita Blanchard, MD      polyethylene glycol  17 g Oral Daily PRN Dairl Blanchard, MD      potassium chloride  20 mEq Oral Daily Dairl Blanchard, MD      senna  1 tablet Oral Daily Dairl Blanchard, MD          Today, Patient Was Seen By: David Licona MD    **Please Note: This note may have been constructed using a voice recognition system  **

## 2021-06-21 NOTE — CONSULTS
Consultation - Wm Warner 77 y o  female MRN: [id number]    Unit/Bed#: [facility] 931-01 Encounter: [id number]      Assessment/Plan     Hypothyroidism  Assessment & Plan  On levothyroxine 100 mg once daily, on clear if patient is taking current dose,   TSH of 36 800 uiu/ml and free T4 0 78   patient is unable to provide more information and was not able to get hold of assisted living facility,  Considering her age and history of atrial fibrillation We increased levothyroxine to 112 mcg from 150 once daily,   Repeat free T4 and a TSH 6 weeks  Outpatient follow-up with Endocrinology  Sage Melvin hematuria  Assessment & Plan  Management per primary team              Inpatient consult to Endocrinology     Performed by  Cass Trammell MD     Authorized by Susy Salazar MD              CC: hematuria       History of Present Illness     HPI: Wm Warner is a 77y o  year old female with past medical history of hypothyroidism, neurogenic bladder with chronic indwelling Davis catheter, COPD, seizure disorder, CAD is admitted for gross hematuria status post cystoscopy with biopsy of the bladder tumor  Endocrinology is consulted for management of hypothyroidism  Patient history of hypothyroidism on levothyroxine 100 mcg once daily  She resides in a long term assisted living facility  She was seen and examined at bedside, unaware of history of hypothyroidism, and levothyroxine dose  She is clinically euthroid, denies cold intolerance, dry skin, hair loss, brittle nails, sleep disturbance, weight changes, leg swelling  The TFT results showed TSH of 36 800 uiu/ml, TFT in December 2020 showed TSH of 16 992 and free T4 of 0 70 ng/dl  Review of Systems   Constitutional: Negative for activity change, appetite change, chills, diaphoresis, fatigue, fever and unexpected weight change  HENT: Negative for congestion, drooling, ear discharge, ear pain, trouble swallowing and voice change      Eyes: Negative for photophobia, pain, discharge, redness, itching and visual disturbance  Respiratory: Negative for cough, chest tightness, shortness of breath and wheezing  Cardiovascular: Negative for chest pain, palpitations and leg swelling  Gastrointestinal: Negative for abdominal distention, abdominal pain, blood in stool, diarrhea, nausea and vomiting  Endocrine: Negative for cold intolerance, heat intolerance, polydipsia, polyphagia and polyuria  Genitourinary: Positive for hematuria  Negative for dysuria, flank pain and frequency  Musculoskeletal: Negative for back pain, gait problem, joint swelling, myalgias, neck pain and neck stiffness  Skin: Negative for color change, pallor, rash and wound  Neurological: Negative for dizziness, tremors, seizures, syncope, speech difficulty, weakness, numbness and headaches  Psychiatric/Behavioral: Negative for agitation         Historical Information   Past Medical History:   Diagnosis Date    Atrial fibrillation ([facility] [address] [facility] )     Brain aneurysm     CAD (coronary artery disease)     Cardiac disease     had stents 12 years ago in [facility]    CKD (chronic kidney disease) stage 3, GFR 30-59 ml/min ([facility]) ckd    COPD (chronic obstructive pulmonary disease) ([facility])     Dementia ([facility])     Diastolic CHF, chronic ([facility])     GERD (gastroesophageal reflux disease)     Hyperlipidemia     Hyperlipidemia     Hypertension     Hypothyroidism     Migraine     PAD (peripheral artery disease) ([facility])     Renal disorder     Seizures ([facility] [facility] )     Stroke [facility])      Past Surgical History:   Procedure Laterality Date    APPENDECTOMY      ARTERIOGRAM N/A 12/7/2018    Procedure: ARTERIOGRAM WITH STENT PLACEMENT;  Surgeon: Delpha Romberg Doctor, MD;  [facility]: [facility];  Service: Vascular    BLADDER TUMOR EXCISION     [id number]  clip right frontal lobe    CARDIAC SURGERY      COLONOSCOPY      CORONARY STENT PLACEMENT      5 stents    EYE SURGERY      IR AORTAGRAM WITH RUN-OFF  2018    MANDIBLE FRACTURE SURGERY      TONSILLECTOMY      TUBAL LIGATION      UTERINE FIBROID SURGERY       Social History   Social History     Substance and Sexual Activity   Alcohol Use Not Currently    Alcohol/week: 0 0 standard drinks     Social History     Substance and Sexual Activity   Drug Use No     Social History     Tobacco Use   Smoking Status Former Smoker    Packs/day: 5 00    Years: 40 00    Pack years: 200 00    Quit date: 2007    Years since quittin 7   Smokeless Tobacco Never Used     Family History:   Family History   Problem Relation Age of Onset    Heart disease Father     Parkinsonism Father     Macular degeneration Mother     Glaucoma Mother     Diabetes Brother     Heart disease Brother        Meds/Allergies   Current Facility-Administered Medications   Medication Dose Route Frequency Provider Last Rate Last Admin    acetaminophen (TYLENOL) tablet 650 mg  650 mg Oral Q8H PRN Jordon Munoz MD        albuterol (PROVENTIL HFA,VENTOLIN HFA) inhaler 2 puff  2 puff Inhalation Q6H PRN Jordon Munoz MD        atorvastatin (LIPITOR) tablet 10 mg  10 mg Oral After Shu Gutierrez MD   10 mg at 21 1748    belladonna-opium (B&O SUPPOSITORY) 16 2-30 mg suppository 1 suppository  30 mg Rectal Q8H PRN Jordon Munoz MD        bisacodyl (DULCOLAX) rectal suppository 10 mg  10 mg Rectal Daily PRN Jordon Munoz MD        buPROPion (WELLBUTRIN XL) 24 hr tablet 150 mg  150 mg Oral Daily Jordon Munoz MD   150 mg at 21 0846    carBAMazepine (TEGretol) chewable tablet 100 mg  100 mg Oral TID Jordon Munoz MD   100 mg at 21 0840    cholecalciferol (VITAMIN D3) tablet 2,000 Units  2,000 Units Oral Daily Jordon Munoz MD   2,000 Units at 21 0843    dextrose infusion 10 %  50 mL/hr Intravenous Continuous Chicho Galvan MD 50 mL/hr at 21 0835 50 mL/hr at 21 0835    digoxin (LANOXIN) tablet 125 mcg  125 mcg Oral Daily Mike Wilson MD   125 mcg at 06/21/21 0843    divalproex sodium (DEPAKOTE) EC tablet 250 mg  250 mg Oral Q12H [address] Mike Wilson MD   250 mg at 06/21/21 0846    famotidine (PEPCID) tablet 20 mg  20 mg Oral Daily Mike Wilson MD   20 mg at 06/21/21 0849    fenofibrate (TRICOR) tablet 145 mg  145 mg Oral Daily Mike Wilson MD   145 mg at 06/21/21 0847    ferrous sulfate tablet 325 mg  325 mg Oral BID Mike Wilson MD   325 mg at 06/21/21 0840    furosemide (LASIX) tablet 20 mg  20 mg Oral Daily Mike Wilson MD   20 mg at 06/21/21 0840    haloperidol (HALDOL) tablet 1 mg  1 mg Oral Q8H PRN Mike Wilson MD        HYDROmorphone (DILAUDID) tablet 2 mg  2 mg Oral Q4H PRN Mike Wilson MD        hyoscyamine (LEVSIN/SL) SL tablet 0 125 mg  0 125 mg Sublingual Q4H PRN Mike Wilson MD        levothyroxine tablet 100 mcg  100 mcg Oral Early Morning Mike Wilson MD   100 mcg at 06/21/21 6816    LORazepam (ATIVAN) tablet 0 5 mg  0 5 mg Oral HS Mike Wilson MD   0 5 mg at 06/20/21 2218    metoprolol tartrate (LOPRESSOR) tablet 100 mg  100 mg Oral Q12H [address] Mike Wilson MD   100 mg at 06/21/21 0839    nitroglycerin (NITROSTAT) SL tablet 0 4 mg  0 4 mg Sublingual Q5 Min PRN Mike Wilson MD        ondansetron [facility]) injection 4 mg  4 mg Intravenous Q6H PRN Mike Wilson MD        phenazopyridine (PYRIDIUM) tablet 100 mg  100 mg Oral TID With Meals Mike Wilson MD   100 mg at 06/21/21 0840    polyethylene glycol (MIRALAX) packet 17 g  17 g Oral Daily PRN Mike Wilson MD        potassium chloride (K-DUR,KLOR-CON) CR tablet 20 mEq  20 mEq Oral Daily Mike Wilson MD   20 mEq at 06/21/21 0845    senna (SENOKOT) tablet 8 6 mg  1 tablet Oral Daily Mike Wilson MD         Allergies   Allergen Reactions    Dye [Iodinated Diagnostic Agents] Other (See Comments)     Affects kidneys    Spiriva Handihaler [Tiotropium Bromide Monohydrate] Swelling    Ramipril Facial Swelling    Penicillins      unsure       Objective   Vitals: Blood pressure 113/54, pulse 55, temperature 97 8 °F (36 6 °C), resp  rate 18, height 5' 2" (1 575 m), weight 74 4 kg (164 lb 0 4 oz), SpO2 90 %  Intake/Output Summary (Last 24 hours) at 6/21/2021 5684  Last data filed at 6/21/2021 9999  Gross per 24 hour   Intake 300 ml   Output 1425 ml   Net -1125 ml     Invasive Devices     Peripheral Intravenous Line            Peripheral IV 06/18/21 Right Antecubital 2 days          Drain            Continuous Bladder Irrigation Three-way 1 day                Physical Exam  Constitutional:       Appearance: She is well-developed  HENT:      Head: Normocephalic and atraumatic  Nose: Nose normal    Eyes:      Pupils: Pupils are equal, round, and reactive to light  Neck:      Thyroid: No thyromegaly  Vascular: No JVD  Cardiovascular:      Rate and Rhythm: Normal rate and regular rhythm  Heart sounds: Normal heart sounds  No murmur heard  No friction rub  No gallop  Pulmonary:      Effort: Pulmonary effort is normal  No respiratory distress  Breath sounds: Normal breath sounds  No stridor  No wheezing or rales  Chest:      Chest wall: No tenderness  Abdominal:      General: Bowel sounds are normal  There is no distension  Palpations: Abdomen is soft  There is no mass  Tenderness: There is no abdominal tenderness  There is no guarding  Musculoskeletal:         General: No deformity  Normal range of motion  Cervical back: Normal range of motion  Skin:     General: Skin is warm  Neurological:      Mental Status: She is alert and oriented to person, place, and time  The history was obtained from the review of the chart, patient and family      Lab Results:       Lab Results   Component Value Date    WBC 5 88 06/21/2021    HGB 8 3 (L) 06/21/2021    HCT 26 3 (L) 06/21/2021     (H) 06/21/2021     (L) 06/21/2021     Lab Results   Component Value Date/Time    BUN 31 (H) 06/21/2021 06:31 AM    K 4 0 06/21/2021 06:31 AM     (H) 06/21/2021 06:31 AM    CO2 29 06/21/2021 06:31 AM    CREATININE 1 39 (H) 06/21/2021 06:31 AM    AST 14 06/19/2021 07:38 AM    ALT 21 06/19/2021 07:38 AM    ALB 3 1 (L) 06/19/2021 07:38 AM     No results for input(s): CHOL, HDL, LDL, TRIG, VLDL in the last 72 hours  No results found for: Lou Ortega  POC Glucose (mg/dl)   Date Value   10/06/2019 102   10/05/2019 130       Imaging Studies: I have personally reviewed pertinent reports  Portions of the record may have been created with voice recognition software

## 2021-06-21 NOTE — ASSESSMENT & PLAN NOTE
· CBI still bleeding so, s/p cysto  Bladder mass biopsy pending   Bleeding stopped this morning   · Urology following, recommend to continue with CBI and clamp, TBI on hold, mild blood in urine   · Regular diet  · No signs of sepsis  · CX urine: mixed contaminants

## 2021-06-21 NOTE — ASSESSMENT & PLAN NOTE
On levothyroxine 100 mg once daily, on clear if patient is taking current dose,   TSH of 36 800 uiu/ml and free T4 0 78   patient is unable to provide more information and was not able to get hold of assisted living facility,  We increased levothyroxine to 112 mcg once daily,   Repeat free T4 and a TSH 6 weeks  Outpatient follow-up with Endocrinology

## 2021-06-21 NOTE — PROGRESS NOTES
06/21/21 1400   Clinical Encounter Type   Visited With Patient   Worship Encounters   Worship Needs Prayer   Sacramental Encounters   Sacrament of Sick-Anointing Anointed

## 2021-06-21 NOTE — PROGRESS NOTES
UROLOGY PROGRESS NOTE   Patient Identifiers: Taylor Cruz (MRN [id number])  Date of Service: 6/21/2021    Subjective:    Resting comfortably without complaints  afebrile  Patient has  no complaints        Objective:     VITALS:    Vitals:    06/21/21 0829   BP: 113/54   Pulse: 55   Resp:    Temp:    SpO2: 90%           LABS:  Lab Results   Component Value Date    HGB 8 3 (L) 06/21/2021    HCT 26 3 (L) 06/21/2021    WBC 5 88 06/21/2021     (L) 06/21/2021   ]    Lab Results   Component Value Date    K 4 0 06/21/2021     (H) 06/21/2021    CO2 29 06/21/2021    BUN 31 (H) 06/21/2021    CREATININE 1 39 (H) 06/21/2021    CALCIUM 9 0 06/21/2021   ]        INPATIENT MEDS:    Current Facility-Administered Medications:     acetaminophen (TYLENOL) tablet 650 mg, 650 mg, Oral, Q8H PRN, Aubrie Whitlock MD    albuterol (PROVENTIL HFA,VENTOLIN HFA) inhaler 2 puff, 2 puff, Inhalation, Q6H PRN, Aubrie Whitlock MD    atorvastatin (LIPITOR) tablet 10 mg, 10 mg, Oral, After Dale Bear MD, 10 mg at 06/20/21 1748    belladonna-opium (B&O SUPPOSITORY) 16 2-30 mg suppository 1 suppository, 30 mg, Rectal, Q8H PRN, Aubrie Whitlock MD    bisacodyl (DULCOLAX) rectal suppository 10 mg, 10 mg, Rectal, Daily PRN, Aubrie Whitlock MD    buPROPion (WELLBUTRIN XL) 24 hr tablet 150 mg, 150 mg, Oral, Daily, Aubrie Whitlock MD, 150 mg at 06/21/21 0846    carBAMazepine (TEGretol) chewable tablet 100 mg, 100 mg, Oral, TID, Aubrie Whitlock MD, 100 mg at 06/21/21 0840    cholecalciferol (VITAMIN D3) tablet 2,000 Units, 2,000 Units, Oral, Daily, Aubrie Whitlock MD, 2,000 Units at 06/21/21 0843    dextrose infusion 10 %, 50 mL/hr, Intravenous, Continuous, Jeannine Menard MD, Last Rate: 50 mL/hr at 06/21/21 0835, 50 mL/hr at 06/21/21 0835    digoxin (LANOXIN) tablet 125 mcg, 125 mcg, Oral, Daily, Aubrie Whitlock MD, 125 mcg at 06/21/21 0843    divalproex sodium (DEPAKOTE) EC tablet 250 mg, 250 mg, Oral, Q12H [address], Jing Ovalles MD, 250 mg at 06/21/21 0846    famotidine (PEPCID) tablet 20 mg, 20 mg, Oral, Daily, Jing Ovalles MD, 20 mg at 06/21/21 0849    fenofibrate (TRICOR) tablet 145 mg, 145 mg, Oral, Daily, Jing Ovalles MD, 145 mg at 06/21/21 0847    ferrous sulfate tablet 325 mg, 325 mg, Oral, BID, Jing Ovalles MD, 325 mg at 06/21/21 0840    furosemide (LASIX) tablet 20 mg, 20 mg, Oral, Daily, Jing Ovalles MD, 20 mg at 06/21/21 0840    haloperidol (HALDOL) tablet 1 mg, 1 mg, Oral, Q8H PRN, Jing Ovalles MD    HYDROmorphone (DILAUDID) tablet 2 mg, 2 mg, Oral, Q4H PRN, Jing Ovalles MD    hyoscyamine (LEVSIN/SL) SL tablet 0 125 mg, 0 125 mg, Sublingual, Q4H PRN, Jing Ovalles MD    levothyroxine tablet 100 mcg, 100 mcg, Oral, Early Morning, Jing Ovalles MD, 100 mcg at 06/21/21 8644    LORazepam (ATIVAN) tablet 0 5 mg, 0 5 mg, Oral, HS, Jing Ovalles MD, 0 5 mg at 06/20/21 2218    metoprolol tartrate (LOPRESSOR) tablet 100 mg, 100 mg, Oral, Q12H [address], Jing Ovalles MD, 100 mg at 06/21/21 0839    nitroglycerin (NITROSTAT) SL tablet 0 4 mg, 0 4 mg, Sublingual, Q5 Min PRN, Jing Ovalles MD    ondansetron [facility]) injection 4 mg, 4 mg, Intravenous, Q6H PRN, Jing Ovalles MD    phenazopyridine (PYRIDIUM) tablet 100 mg, 100 mg, Oral, TID With Meals, Jing Ovalles MD, 100 mg at 06/21/21 1158    polyethylene glycol (MIRALAX) packet 17 g, 17 g, Oral, Daily PRN, Jing Ovalles MD    potassium chloride (K-DUR,KLOR-CON) CR tablet 20 mEq, 20 mEq, Oral, Daily, Jing Ovalles MD, 20 mEq at 06/21/21 0845    senna (SENOKOT) tablet 8 6 mg, 1 tablet, Oral, Daily, Jing Ovalles MD      Physical Exam:   /54   Pulse 55   Temp 97 8 °F (36 6 °C)   Resp 18   Ht 5' 2" (1 575 m)   Wt 74 4 kg (164 lb 0 4 oz)   SpO2 90%   BMI 30 00 kg/m²   GEN: no acute distress RESP: breathing comfortably with no accessory muscle use    ABD: soft, non-tender, non-distended   INCISION:    EXT: no significant peripheral edema   BLUE: in place draining clear yellow urine  no clots and   minimal CBI and      RADIOLOGY:   None     Assessment:   1  Gross hematuria with clot retention  2  Status post cystoscopy with evacuation of clot   3  Atrial flutter on Eliquis  4   Bladder neoplasm    Plan:   -maintain Blue catheter  -she will need a outpatient procedure for resection of bladder tumor-  -

## 2021-06-22 PROBLEM — D68.9 COAGULOPATHY (HCC): Status: ACTIVE | Noted: 2021-06-22

## 2021-06-22 LAB
ANION GAP SERPL CALCULATED.3IONS-SCNC: 2 MMOL/L (ref 4–13)
BASOPHILS NFR BLD AUTO: 1 % (ref 0–1)
BUN SERPL-MCNC: 24 MG/DL (ref 5–25)
CALCIUM SERPL-MCNC: 8.8 MG/DL (ref 8.3–10.1)
CHLORIDE SERPL-SCNC: 116 MMOL/L (ref 100–108)
CO2 SERPL-SCNC: 31 MMOL/L (ref 21–32)
CREAT SERPL-MCNC: 1.36 MG/DL (ref 0.6–1.3)
EOSINOPHIL NFR BLD AUTO: 5 % (ref 0–6)
ERYTHROCYTE [DISTWIDTH] IN BLOOD BY AUTOMATED COUNT: 13.2 % (ref 11.6–15.1)
GFR SERPL CREATININE-BSD FRML MDRD: 38 ML/MIN/1.73SQ M
GLUCOSE SERPL-MCNC: 124 MG/DL (ref 65–140)
HCT VFR BLD AUTO: 26 % (ref 34.8–46.1)
HGB BLD-MCNC: 8 G/DL (ref 11.5–15.4)
IMM GRANULOCYTES NFR BLD AUTO: 4 % (ref 0–2)
LYMPHOCYTES NFR BLD AUTO: 45 % (ref 14–44)
MCH RBC QN AUTO: 33.3 PG (ref 26.8–34.3)
MCHC RBC AUTO-ENTMCNC: 30.8 G/DL (ref 31.4–37.4)
MCV RBC AUTO: 108 FL (ref 82–98)
MONOCYTES NFR BLD AUTO: 9 % (ref 4–12)
NEUTS SEG NFR BLD AUTO: 36 % (ref 43–75)
NRBC BLD AUTO-RTO: 0 /100 WBCS
PLATELET # BLD AUTO: 142 THOUSANDS/UL (ref 149–390)
PMV BLD AUTO: 11.7 FL (ref 8.9–12.7)
POTASSIUM SERPL-SCNC: 3.9 MMOL/L (ref 3.5–5.3)
RBC # BLD AUTO: 2.4 MILLION/UL (ref 3.81–5.12)
SODIUM SERPL-SCNC: 149 MMOL/L (ref 136–145)
SODIUM SERPL-SCNC: 149 MMOL/L (ref 136–145)
WBC # BLD AUTO: 6.04 THOUSAND/UL (ref 4.31–10.16)

## 2021-06-22 PROCEDURE — 99233 SBSQ HOSP IP/OBS HIGH 50: CPT | Performed by: INTERNAL MEDICINE

## 2021-06-22 PROCEDURE — 85027 COMPLETE CBC AUTOMATED: CPT | Performed by: INTERNAL MEDICINE

## 2021-06-22 PROCEDURE — 84295 ASSAY OF SERUM SODIUM: CPT | Performed by: INTERNAL MEDICINE

## 2021-06-22 PROCEDURE — 80048 BASIC METABOLIC PNL TOTAL CA: CPT | Performed by: INTERNAL MEDICINE

## 2021-06-22 PROCEDURE — 99232 SBSQ HOSP IP/OBS MODERATE 35: CPT | Performed by: UROLOGY

## 2021-06-22 PROCEDURE — 99223 1ST HOSP IP/OBS HIGH 75: CPT | Performed by: INTERNAL MEDICINE

## 2021-06-22 RX ORDER — CEFAZOLIN SODIUM 1 G/50ML
1000 SOLUTION INTRAVENOUS
Status: COMPLETED | OUTPATIENT
Start: 2021-06-23 | End: 2021-06-23

## 2021-06-22 RX ORDER — DEXTROSE MONOHYDRATE 50 MG/ML
60 INJECTION, SOLUTION INTRAVENOUS CONTINUOUS
Status: DISCONTINUED | OUTPATIENT
Start: 2021-06-22 | End: 2021-06-25 | Stop reason: HOSPADM

## 2021-06-22 RX ADMIN — PHENAZOPYRIDINE 100 MG: 100 TABLET ORAL at 22:53

## 2021-06-22 RX ADMIN — FERROUS SULFATE TAB 325 MG (65 MG ELEMENTAL FE) 325 MG: 325 (65 FE) TAB at 18:15

## 2021-06-22 RX ADMIN — FAMOTIDINE 20 MG: 20 TABLET ORAL at 08:55

## 2021-06-22 RX ADMIN — DIVALPROEX SODIUM 250 MG: 250 TABLET, DELAYED RELEASE ORAL at 22:54

## 2021-06-22 RX ADMIN — CARBAMAZEPINE 100 MG: 100 TABLET, CHEWABLE ORAL at 18:15

## 2021-06-22 RX ADMIN — METOPROLOL TARTRATE 100 MG: 50 TABLET, FILM COATED ORAL at 22:50

## 2021-06-22 RX ADMIN — Medication 2000 UNITS: at 08:55

## 2021-06-22 RX ADMIN — BUPROPION HYDROCHLORIDE 150 MG: 150 TABLET, FILM COATED, EXTENDED RELEASE ORAL at 08:56

## 2021-06-22 RX ADMIN — FERROUS SULFATE TAB 325 MG (65 MG ELEMENTAL FE) 325 MG: 325 (65 FE) TAB at 08:55

## 2021-06-22 RX ADMIN — DEXTROSE 80 ML/HR: 5 SOLUTION INTRAVENOUS at 14:55

## 2021-06-22 RX ADMIN — CARBAMAZEPINE 100 MG: 100 TABLET, CHEWABLE ORAL at 22:50

## 2021-06-22 RX ADMIN — METOPROLOL TARTRATE 100 MG: 50 TABLET, FILM COATED ORAL at 08:55

## 2021-06-22 RX ADMIN — SENNOSIDES 8.6 MG: 8.6 TABLET, FILM COATED ORAL at 08:55

## 2021-06-22 RX ADMIN — ATORVASTATIN CALCIUM 10 MG: 10 TABLET, FILM COATED ORAL at 18:15

## 2021-06-22 RX ADMIN — POTASSIUM CHLORIDE 20 MEQ: 1500 TABLET, EXTENDED RELEASE ORAL at 08:55

## 2021-06-22 RX ADMIN — PHENAZOPYRIDINE 100 MG: 100 TABLET ORAL at 14:55

## 2021-06-22 RX ADMIN — DIGOXIN 125 MCG: 125 TABLET ORAL at 08:56

## 2021-06-22 RX ADMIN — PHENAZOPYRIDINE 100 MG: 100 TABLET ORAL at 08:56

## 2021-06-22 RX ADMIN — FENOFIBRATE 145 MG: 145 TABLET, FILM COATED ORAL at 08:56

## 2021-06-22 RX ADMIN — FUROSEMIDE 20 MG: 20 TABLET ORAL at 08:55

## 2021-06-22 RX ADMIN — LEVOTHYROXINE SODIUM 112 MCG: 112 TABLET ORAL at 06:14

## 2021-06-22 RX ADMIN — LORAZEPAM 0.5 MG: 0.5 TABLET ORAL at 22:50

## 2021-06-22 RX ADMIN — DIVALPROEX SODIUM 250 MG: 250 TABLET, DELAYED RELEASE ORAL at 08:56

## 2021-06-22 RX ADMIN — CARBAMAZEPINE 100 MG: 100 TABLET, CHEWABLE ORAL at 08:55

## 2021-06-22 NOTE — PROGRESS NOTES
UROLOGY PROGRESS NOTE   Patient Identifiers: Taylor Cruz (MRN [id number])  Date of Service: 6/22/2021        Assessment:     1  Gross hematuria with clot urinary retention  - resolved status post biopsy and fulguration    2  Newly diagnosed high-grade urothelial carcinoma the bladder      We discussed management options for the bladder lesion  I recommended proceeding to the operating room for a bladder biopsy/transurethral resection of bladder tumor (TURBT)  We discussed that this is the standard procedure and is both diagnostic, and therapeutic  We discussed the procedure in detail and the normal postoperative course was presented  The patient understood the main risks to be bleeding, infection, bladder perforation, and the need for a catheter or stent  The patient asked questions and all of them were answered  Discussed that the surgery can likely be performed safely as an outpatient  Patient is currently remaining hospitalized due to medical management of her hypernatremia  Patient's daughter is eager to move forward with the next steps are oncologic management and I will investigate having the procedure performed as an inpatient pending operating room availability    Plan:   - will tentatively plan for a trans urethral resection of bladder tumor tomorrow pending availability of operating room resources  -patient has been made NPO past midnight        Subjective:     24 HR EVENTS:   no significant events  Patient has  no complaints        Objective:     VITALS:    Vitals:    06/22/21 1428   BP: 129/62   Pulse: 55   Resp: 18   Temp: 98 3 °F (36 8 °C)   SpO2: 96%       INS & OUTS:  [unfilled]    LABS:  Lab Results   Component Value Date    HGB 8 0 (L) 06/22/2021    HCT 26 0 (L) 06/22/2021    WBC 6 04 06/22/2021     (L) 06/22/2021   ]    Lab Results   Component Value Date    K 3 9 06/22/2021     (H) 06/22/2021    CO2 31 06/22/2021    BUN 24 06/22/2021    CREATININE 1 36 (H) 06/22/2021    CALCIUM 8 8 06/22/2021   ]    INPATIENT MEDS:    Current Facility-Administered Medications:     acetaminophen (TYLENOL) tablet 650 mg, 650 mg, Oral, Q8H PRN, Louisa Daniel MD    albuterol (PROVENTIL HFA,VENTOLIN HFA) inhaler 2 puff, 2 puff, Inhalation, Q6H PRN, Louisa Daniel MD    atorvastatin (LIPITOR) tablet 10 mg, 10 mg, Oral, After Cyrena Merlin, MD, 10 mg at 06/21/21 1826    belladonna-opium (B&O SUPPOSITORY) 16 2-30 mg suppository 1 suppository, 30 mg, Rectal, Q8H PRN, Louisa Daniel MD    bisacodyl (DULCOLAX) rectal suppository 10 mg, 10 mg, Rectal, Daily PRN, Louisa Daniel MD    buPROPion (WELLBUTRIN XL) 24 hr tablet 150 mg, 150 mg, Oral, Daily, Louisa Daniel MD, 150 mg at 06/22/21 0856    carBAMazepine (TEGretol) chewable tablet 100 mg, 100 mg, Oral, TID, Louisa Daniel MD, 100 mg at 06/22/21 0855    [START ON 6/23/2021] ceFAZolin (ANCEF) IVPB (premix in dextrose) 1,000 mg 50 mL, 1,000 mg, Intravenous, On Call To OR, Louisa Daniel MD    cholecalciferol (VITAMIN D3) tablet 2,000 Units, 2,000 Units, Oral, Daily, Louisa Daniel MD, 2,000 Units at 06/22/21 0855    dextrose 5 % infusion, 80 mL/hr, Intravenous, Continuous, Gabe Salas MD, Last Rate: 80 mL/hr at 06/22/21 1455, 80 mL/hr at 06/22/21 1455    digoxin (LANOXIN) tablet 125 mcg, 125 mcg, Oral, Daily, Louisa Daniel MD, 125 mcg at 06/22/21 0856    divalproex sodium (DEPAKOTE) EC tablet 250 mg, 250 mg, Oral, Q12H [address], Louisa Daniel MD, 250 mg at 06/22/21 0856    famotidine (PEPCID) tablet 20 mg, 20 mg, Oral, Daily, Louisa Daniel MD, 20 mg at 06/22/21 0855    fenofibrate (TRICOR) tablet 145 mg, 145 mg, Oral, Daily, Louisa Daniel MD, 145 mg at 06/22/21 0856    ferrous sulfate tablet 325 mg, 325 mg, Oral, BID, Louisa Daniel MD, 325 mg at 06/22/21 0855    furosemide (LASIX) tablet 20 mg, 20 mg, Oral, Daily, Louisa Daniel MD, 20 mg at 06/22/21 0855    haloperidol (HALDOL) tablet 1 mg, 1 mg, Oral, Q8H PRN, Justice Braxton MD    HYDROmorphone (DILAUDID) tablet 2 mg, 2 mg, Oral, Q4H PRN, Justice Braxton MD    hyoscyamine (LEVSIN/SL) SL tablet 0 125 mg, 0 125 mg, Sublingual, Q4H PRN, Justice Braxton MD    levothyroxine tablet 112 mcg, 112 mcg, Oral, Early Morning, Asia Alfonso MD, 112 mcg at 06/22/21 5100    LORazepam (ATIVAN) tablet 0 5 mg, 0 5 mg, Oral, HS, Justice Braxton MD, 0 5 mg at 06/21/21 2130    metoprolol tartrate (LOPRESSOR) tablet 100 mg, 100 mg, Oral, Q12H [address], Justice Braxton MD, 100 mg at 06/22/21 0855    nitroglycerin (NITROSTAT) SL tablet 0 4 mg, 0 4 mg, Sublingual, Q5 Min PRN, Justice Braxton MD    ondansetron [facility]) injection 4 mg, 4 mg, Intravenous, Q6H PRN, Justice Braxton MD    phenazopyridine (PYRIDIUM) tablet 100 mg, 100 mg, Oral, TID With Meals, Justice Braxton MD, 100 mg at 06/22/21 1455    polyethylene glycol (MIRALAX) packet 17 g, 17 g, Oral, Daily PRN, Justice Braxton MD    potassium chloride (K-DUR,KLOR-CON) CR tablet 20 mEq, 20 mEq, Oral, Daily, Justice Braxton MD, 20 mEq at 06/22/21 0855    senna (SENOKOT) tablet 8 6 mg, 1 tablet, Oral, Daily, Justice Braxton MD, 8 6 mg at 06/22/21 0855      Physical Exam:   /62   Pulse 55   Temp 98 3 °F (36 8 °C)   Resp 18   Ht 5' 2" (1 575 m)   Wt 76 kg (167 lb 9 6 oz)   SpO2 96%   BMI 30 65 kg/m²   GEN: resting comfortably  RESP: breathing comfortably with no accessory muscle use  CV: no significant peripheral edema  ABD: soft, non-tender, non-distended  INCISION: clean dry and intact  DRAINS: none  BLUE: in place draining clear yellow urine

## 2021-06-22 NOTE — ASSESSMENT & PLAN NOTE
· Due to hematuria  · Drop in Hb from 12 5 on 06/18 to 8 today  · Plan as above  · Eliquis held  · Monitor H/H  · Transfuse as needed

## 2021-06-22 NOTE — CONSULTS
Consultation - Nephrology   Debora Madrigal 77 y o  female MRN: [id number]  Unit/Bed#: [facility] 931-01 Encounter: [id number]    ASSESSMENT AND PLAN:  Patient is 69-year-old female with significant medical issues of neurogenic bladder, chronic indwelling Davis catheter, COPD, bladder tumor, CKD stage 3, baseline creatinine 1 3 to 1 5, seizure disorder, presented with gross hematuria  We are consulted for hypernatremia CKD management  CKD stage 3, baseline creatinine 1 3 to 1 5  -creatinine 1 3 overall stable at baseline today  -UA shows 2+ proteinuria, innumerable RBCs, in the setting of gross hematuria   -avoid nephrotoxins or NSAIDs  -will need eventual repeat UA with microscopy once improved from gross hematuria standpoint  Solitary left kidney, 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, no left hydronephrosis  Also cortical scarring in the left kidney noted in prior CT scans  Hypernatremia  -serum sodium 149 above goal  -likely in the setting of poor free water intake   -will start patient on IV D5W at 80 mL/hour  -free water deficit 2 4 L for goal serum sodium 140 in next 24 hours  This does not take insensible losses or urine output into account  -repeat BMP in a m   -closely monitor urine output    Gross hematuria, status post cystoscopy with biopsy, newly diagnosed bladder tumor  Will eventually need transurethral resection of bladder tumor as per Urology note  Systolic CHF, prior echo in 2019 shows EF 45%  -currently remains on Lasix p o , continue same   -clinically seems close to euvolemic  Remains on room air  Monitor intake and output, daily weight  Other issues include a flutter, peripheral arterial disease, CAD, status post PTCA, documented dementia      Discussed above plan in detail with primary team     HISTORY OF PRESENT ILLNESS:  Requesting Physician: Michelle Godinez MD  Reason for Consult:  Hypernatremia, CKD    Debora Madrigal is a 77y o  year old female who was admitted to [facility] after presenting with gross hematuria  A renal consultation is requested today for assistance in the management of hypernatremia, CKD  Patient has underlying documented dementia based on chart review  She is very confused although active and alert  She is very poor historian due to same and unable to provide much history  All the history is obtained from reviewing medical records and talking to medical staff  Patient initially presented with gross hematuria and underwent cystoscopy evaluation, also had CBI  Closely followed by Urology  Currently has chronic indwelling Davis catheter  At the time of my encounter gross hematuria seems to have improved  She remains on room air  As per my discussion with floor nurse, she has overall poor p o  Liquid intake      PAST MEDICAL HISTORY:  Past Medical History:   Diagnosis Date    Atrial fibrillation ([facility] )     Brain aneurysm     CAD (coronary artery disease)     Cardiac disease     had stents 12 years ago [facility]    CKD (chronic kidney disease) stage 3, GFR 30-59 ml/min ([facility]) ckd    COPD (chronic obstructive pulmonary disease) ([facility])     Dementia ([facility])     Diastolic CHF, chronic ([facility])     GERD (gastroesophageal reflux disease)     Hyperlipidemia     Hyperlipidemia     Hypertension     Hypothyroidism     Migraine     PAD (peripheral artery disease) ([facility])     Renal disorder     Seizures ([facility] )     Stroke [facility]        PAST SURGICAL HISTORY:  Past Surgical History:   Procedure Laterality Date    APPENDECTOMY      ARTERIOGRAM N/A 12/7/2018    Procedure: ARTERIOGRAM WITH STENT PLACEMENT;  Surgeon: Jose Reddy MD;  Location: [facility];  Service: Vascular    BLADDER TUMOR EXCISION     [id number]  clip right frontal lobe    CARDIAC SURGERY      COLONOSCOPY      CORONARY STENT PLACEMENT      5 stents    EYE SURGERY      IR AORTAGRAM WITH RUN-OFF  12/7/2018    MANDIBLE FRACTURE SURGERY      PA CYSTOURETHROSCOPY W/IRRIG & EVAC CLOTS N/A 2021    Procedure: CYSTOSCOPY EVACUATION OF CLOTS;  Surgeon: Tiki Dumont MD;  Location: [facility];  Service: Urology    TONSILLECTOMY      TUBAL LIGATION      UTERINE FIBROID SURGERY         ALLERGIES:  Allergies   Allergen Reactions    Dye [Iodinated Diagnostic Agents] Other (See Comments)     Affects kidneys    Spiriva Handihaler [Tiotropium Bromide Monohydrate] Swelling    Ramipril Facial Swelling    Penicillins      unsure       SOCIAL HISTORY:  Social History     Substance and Sexual Activity   Alcohol Use Not Currently    Alcohol/week: 0 0 standard drinks     Social History     Substance and Sexual Activity   Drug Use No     Social History     Tobacco Use   Smoking Status Former Smoker    Packs/day: 5 00    Years: 40 00    Pack years: 200 00    Quit date: 2007    Years since quittin 8   Smokeless Tobacco Never Used       FAMILY HISTORY:  Family History   Problem Relation Age of Onset    Heart disease Father     Parkinsonism Father     Macular degeneration Mother     Glaucoma Mother     Diabetes Brother     Heart disease Brother        MEDICATIONS:    Current Facility-Administered Medications:     acetaminophen (TYLENOL) tablet 650 mg, 650 mg, Oral, Q8H PRN, Tiki Dumont MD    albuterol (PROVENTIL HFA,VENTOLIN HFA) inhaler 2 puff, 2 puff, Inhalation, Q6H PRN, Tiki Dumont MD    atorvastatin (LIPITOR) tablet 10 mg, 10 mg, Oral, After Dinner, Tiki Dumont MD, 10 mg at 21 1826    belladonna-opium (B&O SUPPOSITORY) 16 2-30 mg suppository 1 suppository, 30 mg, Rectal, Q8H PRN, Tiki Dumont MD    bisacodyl (DULCOLAX) rectal suppository 10 mg, 10 mg, Rectal, Daily PRN, Tiki Dumont MD    buPROPion (WELLBUTRIN XL) 24 hr tablet 150 mg, 150 mg, Oral, Daily, Tiki Dumont MD, 150 mg at 21 0856    carBAMazepine (TEGretol) chewable tablet 100 mg, 100 mg, Oral, TID, Arnav Farris Marietta Dietrich MD, 100 mg at 06/22/21 0855    cholecalciferol (VITAMIN D3) tablet 2,000 Units, 2,000 Units, Oral, Daily, Carlos Duarte MD, 2,000 Units at 06/22/21 0855    digoxin (LANOXIN) tablet 125 mcg, 125 mcg, Oral, Daily, Carlos Duarte MD, 125 mcg at 06/22/21 0856    divalproex sodium (DEPAKOTE) EC tablet 250 mg, 250 mg, Oral, Q12H [address], Carlos Duarte MD, 250 mg at 06/22/21 0856    famotidine (PEPCID) tablet 20 mg, 20 mg, Oral, Daily, Carlos Duarte MD, 20 mg at 06/22/21 0855    fenofibrate (TRICOR) tablet 145 mg, 145 mg, Oral, Daily, Carlos Duarte MD, 145 mg at 06/22/21 0856    ferrous sulfate tablet 325 mg, 325 mg, Oral, BID, Carlos Duarte MD, 325 mg at 06/22/21 0855    furosemide (LASIX) tablet 20 mg, 20 mg, Oral, Daily, Carlos Duarte MD, 20 mg at 06/22/21 0855    haloperidol (HALDOL) tablet 1 mg, 1 mg, Oral, Q8H PRN, Carlos Duarte MD    HYDROmorphone (DILAUDID) tablet 2 mg, 2 mg, Oral, Q4H PRN, Carlos Duarte MD    hyoscyamine (LEVSIN/SL) SL tablet 0 125 mg, 0 125 mg, Sublingual, Q4H PRN, Carlos Duarte MD    levothyroxine tablet 112 mcg, 112 mcg, Oral, Early Morning, Cass Trammell MD, 112 mcg at 06/22/21 8222    LORazepam (ATIVAN) tablet 0 5 mg, 0 5 mg, Oral, HS, Carlos Duarte MD, 0 5 mg at 06/21/21 2130    metoprolol tartrate (LOPRESSOR) tablet 100 mg, 100 mg, Oral, Q12H [address], Carlos Duarte MD, 100 mg at 06/22/21 0855    nitroglycerin (NITROSTAT) SL tablet 0 4 mg, 0 4 mg, Sublingual, Q5 Min PRN, Carlos Duarte MD    ondansetron [facility]) injection 4 mg, 4 mg, Intravenous, Q6H PRN, Carlos Duarte MD    phenazopyridine (PYRIDIUM) tablet 100 mg, 100 mg, Oral, TID With Meals, Carlos Duarte MD, 100 mg at 06/22/21 0856    polyethylene glycol (MIRALAX) packet 17 g, 17 g, Oral, Daily PRN, Carlos Duarte MD    potassium chloride (K-DUR,KLOR-CON) CR tablet 20 mEq, 20 mEq, Oral, Daily, Mina Pozo Allyson Almodovar MD, 20 mEq at 06/22/21 0855    senna (SENOKOT) tablet 8 6 mg, 1 tablet, Oral, Daily, Monique Booth MD, 8 6 mg at 06/22/21 6440    REVIEW OF SYSTEMS:  Review of system remains overall very limited as patient is poor historian with underlying confusion/documented dementia      PHYSICAL EXAM:  Current Weight: Weight - Scale: 76 kg (167 lb 9 6 oz)  First Weight: Weight - Scale: 74 4 kg (164 lb)  Vitals:    06/22/21 1428   BP: 129/62   Pulse: 55   Resp: 18   Temp: 98 3 °F (36 8 °C)   SpO2: 96%       Intake/Output Summary (Last 24 hours) at 6/22/2021 1434  Last data filed at 6/22/2021 1301  Gross per 24 hour   Intake 120 ml   Output 500 ml   Net -380 ml     Wt Readings from Last 3 Encounters:   06/22/21 76 kg (167 lb 9 6 oz)   06/18/21 81 5 kg (179 lb 10 8 oz)   06/01/21 78 4 kg (172 lb 13 5 oz)     Temp Readings from Last 3 Encounters:   06/22/21 98 3 °F (36 8 °C)   06/18/21 98 °F (36 7 °C) (Oral)   06/01/21 (!) 96 5 °F (35 8 °C) (Axillary)     BP Readings from Last 3 Encounters:   06/22/21 129/62   06/18/21 161/74   06/01/21 (!) 182/78     Pulse Readings from Last 3 Encounters:   06/22/21 55   06/18/21 61   06/01/21 (!) 53        Physical Examination:  General:  Lying in bed, no acute distress  Eyes:  Mild conjunctival pallor present  ENT:  External examination of ears and nose unremarkable  Neck:  No obvious lymphadenopathy appreciated  Respiratory:  Bilateral air entry present  CVS:  S1, S2 present  GI:  Soft, nontender, nondistended  CNS:  Active alert oriented times 0  Extremities:  No significant edema in legs  Psych:  Confused, conscious, active, alert  Skin:  No new rash in legs    Invasive Devices:   Continuous Bladder Irrigation Three-way (Active)   Site Assessment Clean;Skin intact 06/22/21 0856   Securement Method Securing device (Describe) 06/22/21 0856   Rate Moderate 06/19/21 2330   Irrigant Normal saline 06/19/21 2330   CBI Irrigation Intake (mL) 3000 mL 06/20/21 1501   CBI Davis Output (mL) 250 mL 06/22/21 1201   CBI Net Output (mL) -1200 mL 06/20/21 1501   Davis Output Appearance Cloudy; Red flecks 06/22/21 1201     Lab Results:   Results from last 7 days   Lab Units 06/22/21  0611 06/21/21  0631 06/20/21  0646 06/19/21  0738 06/18/21  1525   WBC Thousand/uL 6 04 5 88 6 24 9 08 6 54   HEMOGLOBIN g/dL 8 0* 8 3* 8 8* 10 4* 12 5   HEMATOCRIT % 26 0* 26 3* 28 1* 33 0* 38 5   PLATELETS Thousands/uL 142* 132* 151 166 188   POTASSIUM mmol/L 3 9 4 0 4 0 3 9 4 4   CHLORIDE mmol/L 116* 119* 115* 113* 106   CO2 mmol/L 31 29 31 33* 35*   BUN mg/dL 24 31* 33* 37* 32*   CREATININE mg/dL 1 36* 1 39* 1 34* 1 56* 1 56*   CALCIUM mg/dL 8 8 9 0 8 8 9 4 9 4   MAGNESIUM mg/dL  --   --   --  2 6  --    PHOSPHORUS mg/dL  --   --   --  3 9  --        Other Studies:   No orders to display   CT scan shows mild circumferential bladder wall thickening, intraluminal bladder hemorrhage  Also shows congenitally    Portions of the record may have been created with voice recognition software  Occasional wrong word or "sound a like" substitutions may have occurred due to the inherent limitations of voice recognition software  Read the chart carefully and recognize, using context, where substitutions have occurred

## 2021-06-22 NOTE — CASE MANAGEMENT
Cm spoke with pt's dtr Desmond Mims to discus pt's aftercare plan  Per Desmond Mims she is fine with her mother returning to [facility] at d/c  CM will follow

## 2021-06-23 ENCOUNTER — TELEPHONE (OUTPATIENT)
Dept: UROLOGY | Facility: CLINIC | Age: 76
End: 2021-06-23

## 2021-06-23 ENCOUNTER — APPOINTMENT (INPATIENT)
Dept: RADIOLOGY | Facility: HOSPITAL | Age: 76
DRG: 662 | End: 2021-06-23
Attending: INTERNAL MEDICINE
Payer: MEDICARE

## 2021-06-23 ENCOUNTER — APPOINTMENT (INPATIENT)
Dept: RADIOLOGY | Facility: HOSPITAL | Age: 76
DRG: 662 | End: 2021-06-23
Payer: MEDICARE

## 2021-06-23 ENCOUNTER — TELEPHONE (OUTPATIENT)
Dept: RADIOLOGY | Facility: HOSPITAL | Age: 76
End: 2021-06-23

## 2021-06-23 ENCOUNTER — ANESTHESIA EVENT (INPATIENT)
Dept: PERIOP | Facility: HOSPITAL | Age: 76
DRG: 662 | End: 2021-06-23
Payer: MEDICARE

## 2021-06-23 ENCOUNTER — ANESTHESIA (INPATIENT)
Dept: PERIOP | Facility: HOSPITAL | Age: 76
DRG: 662 | End: 2021-06-23
Payer: MEDICARE

## 2021-06-23 LAB
ANION GAP SERPL CALCULATED.3IONS-SCNC: 4 MMOL/L (ref 4–13)
BUN SERPL-MCNC: 20 MG/DL (ref 5–25)
CALCIUM SERPL-MCNC: 8.8 MG/DL (ref 8.3–10.1)
CHLORIDE SERPL-SCNC: 113 MMOL/L (ref 100–108)
CO2 SERPL-SCNC: 28 MMOL/L (ref 21–32)
CREAT SERPL-MCNC: 1.19 MG/DL (ref 0.6–1.3)
GFR SERPL CREATININE-BSD FRML MDRD: 45 ML/MIN/1.73SQ M
GLUCOSE SERPL-MCNC: 138 MG/DL (ref 65–140)
POTASSIUM SERPL-SCNC: 4.2 MMOL/L (ref 3.5–5.3)
SODIUM SERPL-SCNC: 145 MMOL/L (ref 136–145)

## 2021-06-23 PROCEDURE — 52240 CYSTOSCOPY AND TREATMENT: CPT | Performed by: UROLOGY

## 2021-06-23 PROCEDURE — 0TBB8ZZ EXCISION OF BLADDER, VIA NATURAL OR ARTIFICIAL OPENING ENDOSCOPIC: ICD-10-PCS | Performed by: UROLOGY

## 2021-06-23 PROCEDURE — 99232 SBSQ HOSP IP/OBS MODERATE 35: CPT | Performed by: INTERNAL MEDICINE

## 2021-06-23 PROCEDURE — 71045 X-RAY EXAM CHEST 1 VIEW: CPT

## 2021-06-23 PROCEDURE — 71260 CT THORAX DX C+: CPT

## 2021-06-23 PROCEDURE — 88307 TISSUE EXAM BY PATHOLOGIST: CPT | Performed by: PATHOLOGY

## 2021-06-23 PROCEDURE — 80048 BASIC METABOLIC PNL TOTAL CA: CPT | Performed by: INTERNAL MEDICINE

## 2021-06-23 PROCEDURE — 0W3R7ZZ CONTROL BLEEDING IN GENITOURINARY TRACT, VIA NATURAL OR ARTIFICIAL OPENING: ICD-10-PCS | Performed by: UROLOGY

## 2021-06-23 PROCEDURE — 99232 SBSQ HOSP IP/OBS MODERATE 35: CPT | Performed by: UROLOGY

## 2021-06-23 PROCEDURE — G1004 CDSM NDSC: HCPCS

## 2021-06-23 PROCEDURE — 0T5B8ZZ DESTRUCTION OF BLADDER, VIA NATURAL OR ARTIFICIAL OPENING ENDOSCOPIC: ICD-10-PCS | Performed by: UROLOGY

## 2021-06-23 PROCEDURE — 74177 CT ABD & PELVIS W/CONTRAST: CPT

## 2021-06-23 RX ORDER — DIPHENHYDRAMINE HYDROCHLORIDE 50 MG/ML
50 INJECTION INTRAMUSCULAR; INTRAVENOUS ONCE
Status: COMPLETED | OUTPATIENT
Start: 2021-06-23 | End: 2021-06-23

## 2021-06-23 RX ORDER — LIDOCAINE HYDROCHLORIDE 10 MG/ML
INJECTION, SOLUTION EPIDURAL; INFILTRATION; INTRACAUDAL; PERINEURAL AS NEEDED
Status: DISCONTINUED | OUTPATIENT
Start: 2021-06-23 | End: 2021-06-23

## 2021-06-23 RX ORDER — CEFAZOLIN SODIUM 1 G/50ML
SOLUTION INTRAVENOUS AS NEEDED
Status: DISCONTINUED | OUTPATIENT
Start: 2021-06-23 | End: 2021-06-23

## 2021-06-23 RX ORDER — FENTANYL CITRATE 50 UG/ML
INJECTION, SOLUTION INTRAMUSCULAR; INTRAVENOUS AS NEEDED
Status: DISCONTINUED | OUTPATIENT
Start: 2021-06-23 | End: 2021-06-23

## 2021-06-23 RX ORDER — FENTANYL CITRATE/PF 50 MCG/ML
25 SYRINGE (ML) INJECTION
Status: DISCONTINUED | OUTPATIENT
Start: 2021-06-23 | End: 2021-06-23 | Stop reason: HOSPADM

## 2021-06-23 RX ORDER — EPHEDRINE SULFATE 50 MG/ML
INJECTION INTRAVENOUS AS NEEDED
Status: DISCONTINUED | OUTPATIENT
Start: 2021-06-23 | End: 2021-06-23

## 2021-06-23 RX ORDER — MAGNESIUM HYDROXIDE 1200 MG/15ML
LIQUID ORAL AS NEEDED
Status: DISCONTINUED | OUTPATIENT
Start: 2021-06-23 | End: 2021-06-23 | Stop reason: HOSPADM

## 2021-06-23 RX ORDER — PROPOFOL 10 MG/ML
INJECTION, EMULSION INTRAVENOUS AS NEEDED
Status: DISCONTINUED | OUTPATIENT
Start: 2021-06-23 | End: 2021-06-23

## 2021-06-23 RX ORDER — ONDANSETRON 2 MG/ML
4 INJECTION INTRAMUSCULAR; INTRAVENOUS ONCE AS NEEDED
Status: DISCONTINUED | OUTPATIENT
Start: 2021-06-23 | End: 2021-06-23 | Stop reason: HOSPADM

## 2021-06-23 RX ORDER — ONDANSETRON 2 MG/ML
INJECTION INTRAMUSCULAR; INTRAVENOUS AS NEEDED
Status: DISCONTINUED | OUTPATIENT
Start: 2021-06-23 | End: 2021-06-23

## 2021-06-23 RX ORDER — CEFAZOLIN SODIUM 1 G/50ML
1000 SOLUTION INTRAVENOUS
Status: DISPENSED | OUTPATIENT
Start: 2021-06-23 | End: 2021-06-24

## 2021-06-23 RX ADMIN — ONDANSETRON 4 MG: 2 INJECTION INTRAMUSCULAR; INTRAVENOUS at 10:20

## 2021-06-23 RX ADMIN — CEFAZOLIN SODIUM 1000 MG: 1 SOLUTION INTRAVENOUS at 09:22

## 2021-06-23 RX ADMIN — PHENAZOPYRIDINE 100 MG: 100 TABLET ORAL at 12:03

## 2021-06-23 RX ADMIN — FENTANYL CITRATE 25 MCG: 50 INJECTION INTRAMUSCULAR; INTRAVENOUS at 09:48

## 2021-06-23 RX ADMIN — DIPHENHYDRAMINE HYDROCHLORIDE 50 MG: 50 INJECTION, SOLUTION INTRAMUSCULAR; INTRAVENOUS at 15:40

## 2021-06-23 RX ADMIN — DEXTROSE 80 ML/HR: 5 SOLUTION INTRAVENOUS at 03:39

## 2021-06-23 RX ADMIN — FENTANYL CITRATE 25 MCG: 50 INJECTION INTRAMUSCULAR; INTRAVENOUS at 09:39

## 2021-06-23 RX ADMIN — EPHEDRINE SULFATE 10 MG: 50 INJECTION, SOLUTION INTRAVENOUS at 09:53

## 2021-06-23 RX ADMIN — LEVOTHYROXINE SODIUM 112 MCG: 112 TABLET ORAL at 05:16

## 2021-06-23 RX ADMIN — FENTANYL CITRATE 25 MCG: 50 INJECTION INTRAMUSCULAR; INTRAVENOUS at 10:09

## 2021-06-23 RX ADMIN — FENTANYL CITRATE 25 MCG: 50 INJECTION INTRAMUSCULAR; INTRAVENOUS at 10:20

## 2021-06-23 RX ADMIN — CEFAZOLIN SODIUM 1000 MG: 1 SOLUTION INTRAVENOUS at 05:16

## 2021-06-23 RX ADMIN — LIDOCAINE HYDROCHLORIDE 50 MG: 10 INJECTION, SOLUTION EPIDURAL; INFILTRATION; INTRACAUDAL; PERINEURAL at 09:29

## 2021-06-23 RX ADMIN — PROPOFOL 90 MG: 10 INJECTION, EMULSION INTRAVENOUS at 09:29

## 2021-06-23 RX ADMIN — EPHEDRINE SULFATE 5 MG: 50 INJECTION, SOLUTION INTRAVENOUS at 10:01

## 2021-06-23 RX ADMIN — IOHEXOL 100 ML: 350 INJECTION, SOLUTION INTRAVENOUS at 16:26

## 2021-06-23 NOTE — ASSESSMENT & PLAN NOTE
Wt Readings from Last 3 Encounters:   06/22/21 76 kg (167 lb 9 6 oz)   06/18/21 81 5 kg (179 lb 10 8 oz)   06/01/21 78 4 kg (172 lb 13 5 oz)     Euvolemic at present  Hold Lasix brennen-operatively

## 2021-06-23 NOTE — TELEPHONE ENCOUNTER
Please disregard prior phone note  Patient's TURBT was performed as an inpatient  Findings are very suspicious for muscle invasive disease  Discussed with the patient's daughter extensively  She will not need another surgical procedure  Please do schedule her for follow-up in 2 weeks time, Ynes garcía or Marissa Diego  I discussed radiation with the patient's family, they would like to wait until the next office visit to discuss further      Thank you    CHASE Lane

## 2021-06-23 NOTE — ASSESSMENT & PLAN NOTE
Persistent  Seen by nephrology - input appreciated - on D5W at 80 ml per hour  Lasix held  Monitor Na

## 2021-06-23 NOTE — OP NOTE
Operative Note     PATIENT:  Jet Angulo (MRN [id number])    DATE OF PROCEDURE:   6/23/2021    PRE-OP DIAGNOSES:   1) high grade urothelial carcinoma the bladder  2  Gross hematuria  3  Neurogenic bladder, chronic indwelling Davis catheter      POST-OP DIAGNOSES AND OPERATIVE FINDINGS:   1) high grade urothelial carcinoma the bladder - 7 cm bladder tumor  2  Gross hematuria  3  Neurogenic bladder, chronic indwelling Davis catheter       PROCEDURES:  1) Transurethral resection of bladder tumor    SURGEON:   Saintclair Lah, MD    ANESTHESIA TYPE:  General anesthesia    ESTIMATED BLOOD LOSS:   Minimal      Specimen(s):  ID Type Source Tests Collected by Time Destination   1 : Bladder tumor Anterolateral wall Tissue Urinary Bladder TISSUE EXAM Saintclair Lah, MD 6/23/2021 5400    2 : Bladder tumor deep resection Tissue Urinary Bladder TISSUE EXAM Saintclair Lah, MD 6/23/2021 9956        COMPLICATIONS:   None    ANTIBIOTICS:  Cefazolin    INTRAOPERATIVE THROMBOEMBOLISM PROPHYLAXIS:  Pneumatic compression stockings      PROCEDURE SUMMARY:    The patient was brought to the operating room and anesthesia obtained  The patient was then placed in the lithotomy position and prepped and draped using standard sterile technique  All pressure points were carefully padded  A surgical time-out occurred, antibiotics were administered, and thromboembolism prophylaxis was given  A 27 F saline resectoscope was inserted into the urethra after dilation of the urethral meatus to 28 F using standard urethral sounds  The urethra and bladder were carefully inspected       Cystoscopy findings:    Urethra:  Normal  Prostate:  Normal  Bladder:  Lesion identified, characteristics below  Ureteral orifices: Normal    Urachus:   Normal    Lesion characteristics:  -Appearance:    Papillary  -Number of foci:   1  -Aggregate tumor diameter:  7 cm  -Largest individual tumor:  7 cm  -Location:    Primarily the right anterolateral wall  -Clinical stage:   T2/T3 - intraoperative findings are clearly consistent with muscle invasive disease    Bladder tumor resection:    Using a saline bipolar resectoscope, all abnormal lesions noted above were resected and sent for pathology  Resection craters and surrounding urothelium was electrofulgurated and hemostasis was achieved  Each section was submitted for pathology as documented above  The bladder was then observed multiple cycles of filling and emptying hemostasis was deemed to be excellent prior to terminating the procedure  Due to the patient's ongoing gross hematuria, and the extent of disease which I do not feel can be completely eradicated with trans urethral resection, I next elected to introduce a button and performed extensive cautery an ablation of the bed of the tumor in order to maximize hemostasis  Intravesical chemotherapy was not utilized due to findings consistent with muscle invasive disease      DISPOSITION:   PACU - hemodynamically stable  PLAN:  - patient will return to the medical [facility]  She will be stable for discharge home by tomorrow from a urologic perspective  -patient has a chronic long-term indwelling Davis which will be maintained going forward  -findings the time of surgery discussed with the patient's daughter by phone including the extent of her cancer  I do recommend evaluation by Radiation Oncology for consideration of palliative radiation  I believe the patient is at risk for ongoing hematuria due to the extent of her tumor    Patient's daughter states they did not wish to move forward with chemotherapy

## 2021-06-23 NOTE — PROGRESS NOTES
[address] [facility]  Progress Note Angel Gar [birth year], 77 y o  female MRN: [id number]  Unit/Bed#: [facility] 931-01 Encounter: [id number]  Primary Care Provider: Elina Hernández DO   Date and time admitted to hospital: 6/18/2021 11:42 PM    * Gross hematuria  Assessment & Plan  · Presented to [facility] on 6/18 from her facility [facility] with hematuria in her baca  · CT A/P - "High density components within the bladder lumen in keeping with intraluminal bladder hemorrhage  Mild circumferential bladder wall thickening and surrounding fat stranding could indicate cystitis or could be reactive to the hemorrhage "  · On 6/19 underwent cystoscopy which showed a 2 cm nodular mass in the anterolateral wall from which biopsy was taken  Also underwent evacuation if clot and fulguration of bleeding points   Resection not done as she had been on Eliquis  · Was on CBI which was discontinued after hematuria resolved  · Pathology - high grade urothelial carcinoma  · Discussed with Dr Makenzie Waters - plan for TURBT tomorrow (6/23)  · Monitor H/H  · Transfuse as needed         Acute blood loss anemia  Assessment & Plan  · Due to hematuria  · Drop in Hb from 12 5 on 06/18 to 8 today  · Plan as above  · Eliquis held  · Monitor H/H  · Transfuse as needed      Coagulopathy ([address] )  Assessment & Plan  · Due to Eliquis which has been held pending TURBT      Paroxysmal atrial fibrillation (HCC)  Assessment & Plan  · Eliquis held due to hematuria with acute blood loss anemia  · Ct BB, digoxin      Paroxysmal atrial flutter (HCC)  Assessment & Plan        Coronary artery disease involving native coronary artery of native heart without angina pectoris  Assessment & Plan  · ASA held due to hematuria  · Ct BB    Chronic diastolic heart failure (HCC)  Assessment & Plan  Wt Readings from Last 3 Encounters:   06/22/21 76 kg (167 lb 9 6 oz)   06/18/21 81 5 kg (179 lb 10 8 oz)   06/01/21 78 4 kg (172 lb 13 5 oz) Euvolemic at present  Hold Lasix brennen-operatively        COPD (chronic obstructive pulmonary disease) ([facility])  Assessment & Plan  Respiratory protocol    Hypernatremia  Assessment & Plan  Persistent  Seen by nephrology - input appreciated - on D5W at 80 ml per hour  Lasix held  Monitor Na      VTE Pharmacologic Prophylaxis: VTE Score: 6 High Risk (Score >/= 5) - Pharmacological DVT Prophylaxis Contraindicated  Sequential Compression Devices Ordered  Patient Centered Rounds: Discussed with RN  Discussions with Specialists or Other Care Team Provider:  Discussed with   Dr Makenzie Waters and Dr Karina Schafer    Education and Discussions with Family / Patient: Updated  (daughter) at bedside  Time Spent for Care: 40 minutes  More than 50% of total time spent on counseling and coordination of care as described above  Current Length of Stay: 3 day(s)  Current Patient Status: Inpatient   Certification Statement: The patient will continue to require additional inpatient hospital stay due to Hypernatremia, bladder cancer awaiting TURBT    Code Status: Level 3 - DNAR and DNI    Subjective:   No hematuria  No abdominal pain  No nausea or vomiting    Objective:     Vitals:   Temp (24hrs), Av 4 °F (36 9 °C), Min:98 3 °F (36 8 °C), Max:98 4 °F (36 9 °C)    Temp:  [98 3 °F (36 8 °C)-98 4 °F (36 9 °C)] 98 3 °F (36 8 °C)  HR:  [55-62] 55  Resp:  [18] 18  BP: (129-134)/(62-64) 129/62  SpO2:  [94 %-96 %] 96 %  Body mass index is 30 65 kg/m²  Physical Exam:   Physical Exam  Vitals reviewed  HENT:      Head: Normocephalic  Nose: Nose normal       Mouth/Throat:      Mouth: Mucous membranes are moist    Eyes:      Extraocular Movements: Extraocular movements intact  Cardiovascular:      Rate and Rhythm: Normal rate and regular rhythm  Pulmonary:      Effort: Pulmonary effort is normal  No respiratory distress  Breath sounds: Normal breath sounds  No wheezing     Abdominal:      General: Bowel sounds are normal  There is no distension  Palpations: Abdomen is soft  Tenderness: There is no abdominal tenderness  Musculoskeletal:         General: No swelling  Cervical back: Neck supple  Skin:     General: Skin is warm and dry  Neurological:      General: No focal deficit present  Mental Status: She is alert  Comments: Cognitive impairment   Psychiatric:         Mood and Affect: Mood normal           Additional Data:     Labs:  Results from last 7 days   Lab Units 06/22/21  0611   WBC Thousand/uL 6 04   HEMOGLOBIN g/dL 8 0*   HEMATOCRIT % 26 0*   PLATELETS Thousands/uL 142*   NEUTROS PCT % 36*   LYMPHS PCT % 45*   MONOS PCT % 9   EOS PCT % 5     Results from last 7 days   Lab Units 06/22/21  0611 06/19/21  0738   SODIUM mmol/L 149* 148*   POTASSIUM mmol/L 3 9 3 9   CHLORIDE mmol/L 116* 113*   CO2 mmol/L 31 33*   BUN mg/dL 24 37*   CREATININE mg/dL 1 36* 1 56*   ANION GAP mmol/L 2* 2*   CALCIUM mg/dL 8 8 9 4   ALBUMIN g/dL  --  3 1*   TOTAL BILIRUBIN mg/dL  --  0 32   ALK PHOS U/L  --  77   ALT U/L  --  21   AST U/L  --  14   GLUCOSE RANDOM mg/dL 124 159*     Results from last 7 days   Lab Units 06/19/21  0738   INR  1 19                   Lines/Drains:  Invasive Devices     Peripheral Intravenous Line            Peripheral IV 06/22/21 Left; Lower Arm <1 day          Drain            Continuous Bladder Irrigation Three-way 3 days              Urinary Catheter:  Goal for removal: N/A - Chronic Davis         Recent Cultures (last 7 days):   Results from last 7 days   Lab Units 06/18/21  1746   URINE CULTURE  <10,000 cfu/ml        Last 24 Hours Medication List:   Current Facility-Administered Medications   Medication Dose Route Frequency Provider Last Rate    acetaminophen  650 mg Oral Q8H PRN Sangita Lora MD      albuterol  2 puff Inhalation Q6H PRN Sangita Lora MD      atorvastatin  10 mg Oral After Marimar Hawkins MD      belladonna-opium  30 mg Rectal Q8H PRN Elle Solomon MD      bisacodyl  10 mg Rectal Daily PRN Elle Solomon MD      buPROPion  150 mg Oral Daily Elle Solomon MD      carBAMazepine  100 mg Oral TID MD Karan Hurd ON 6/23/2021] cefazolin  1,000 mg Intravenous On Call To Mathew Forman MD      cholecalciferol  2,000 Units Oral Daily Elle Solomon MD      dextrose  80 mL/hr Intravenous Continuous Yeni Challenger, MD 80 mL/hr (06/22/21 0538)    digoxin  125 mcg Oral Daily Elle Solomon MD      divalproex sodium  250 mg Oral Q12H [facility] Elle Solomon MD      famotidine  20 mg Oral Daily Elle Solomon MD      fenofibrate  145 mg Oral Daily Elle Solomon MD      ferrous sulfate  325 mg Oral BID Elle Solomon MD      haloperidol  1 mg Oral Q8H PRN Elle Solomon MD      HYDROmorphone  2 mg Oral Q4H PRN Elle Solomon MD      hyoscyamine  0 125 mg Sublingual Q4H PRN Elle Solomon MD      levothyroxine  112 mcg Oral Early Morning Sweetie Cortez MD      LORazepam  0 5 mg Oral HS Elle Solomon MD      metoprolol tartrate  100 mg Oral Q12H [facility] Elle Solomon MD      nitroglycerin  0 4 mg Sublingual Q5 Min PRN Elle Solomon MD      ondansetron  4 mg Intravenous Q6H PRN Elle Solomon MD      phenazopyridine  100 mg Oral TID With Meals Elle Solomon MD      polyethylene glycol  17 g Oral Daily PRN Elle Solomon MD      potassium chloride  20 mEq Oral Daily Elle Solomon MD      senna  1 tablet Oral Daily Elle Solomon MD          Today, Patient Was Seen By: Norma Woo MD    **Please Note: This note may have been constructed using a voice recognition system  **

## 2021-06-23 NOTE — TELEPHONE ENCOUNTER
Patient scheduled for 7/6/21 at 10am in the [facility] office with Dr Nic Srinivasan  There was no availability in the [facility] office within time frame  Please confirm with patient's daughter on discharge

## 2021-06-23 NOTE — NURSING NOTE
Patient refused to take 1600 and 1800 pills, stating to the nurse "get closer and I will punch you in the face"  attempted to try again 10 minutes later and patient refusing stating "drop dead"  Slim on call aware

## 2021-06-23 NOTE — PROGRESS NOTES
UROLOGY PROGRESS NOTE   Patient Identifiers: Lucy Genao (MRN [id number])  Date of Service: 6/23/2021        Assessment:     1  Gross hematuria with clot urinary retention  - resolved status post biopsy and fulguration    2  Newly diagnosed high-grade urothelial carcinoma the bladder    Serum sodium has normalized    Plan: To OR for transurethral resection of bladder tumor today    Informed consent obtained the patient's daughter and power of  by telephone        Subjective:     24 HR EVENTS:   no significant events  Patient has  no complaints        Objective:     VITALS:    Vitals:    06/22/21 2250   BP: 144/64   Pulse: 55   Resp:    Temp:    SpO2:        INS & OUTS:  [unfilled]    LABS:  Lab Results   Component Value Date    HGB 8 0 (L) 06/22/2021    HCT 26 0 (L) 06/22/2021    WBC 6 04 06/22/2021     (L) 06/22/2021   ]    Lab Results   Component Value Date    K 4 2 06/23/2021     (H) 06/23/2021    CO2 28 06/23/2021    BUN 20 06/23/2021    CREATININE 1 19 06/23/2021    CALCIUM 8 8 06/23/2021   ]    INPATIENT MEDS:    Current Facility-Administered Medications:     acetaminophen (TYLENOL) tablet 650 mg, 650 mg, Oral, Q8H PRN, Sangita Lora MD    albuterol (PROVENTIL HFA,VENTOLIN HFA) inhaler 2 puff, 2 puff, Inhalation, Q6H PRN, Sangita Lora MD    atorvastatin (LIPITOR) tablet 10 mg, 10 mg, Oral, After Milo Matos MD, 10 mg at 06/22/21 1815    belladonna-opium (B&O SUPPOSITORY) 16 2-30 mg suppository 1 suppository, 30 mg, Rectal, Q8H PRN, Sangita Lora MD    bisacodyl (DULCOLAX) rectal suppository 10 mg, 10 mg, Rectal, Daily PRN, Sangita Lora MD    buPROPion (WELLBUTRIN XL) 24 hr tablet 150 mg, 150 mg, Oral, Daily, Sangita Lora MD, 150 mg at 06/22/21 0856    carBAMazepine (TEGretol) chewable tablet 100 mg, 100 mg, Oral, TID, Sangita Lora MD, 100 mg at 06/22/21 4104    cholecalciferol (VITAMIN D3) tablet 2,000 Units, 2,000 Units, Oral, Daily, Monique Booth MD, 2,000 Units at 06/22/21 0855    dextrose 5 % infusion, 80 mL/hr, Intravenous, Continuous, Gabe Salas MD, Last Rate: 80 mL/hr at 06/23/21 0339, 80 mL/hr at 06/23/21 0339    digoxin (LANOXIN) tablet 125 mcg, 125 mcg, Oral, Daily, Monique Booth MD, 125 mcg at 06/22/21 0856    divalproex sodium (DEPAKOTE) EC tablet 250 mg, 250 mg, Oral, Q12H [address], Monique Booth MD, 250 mg at 06/22/21 2254    famotidine (PEPCID) tablet 20 mg, 20 mg, Oral, Daily, Monique Booth MD, 20 mg at 06/22/21 0855    fenofibrate (TRICOR) tablet 145 mg, 145 mg, Oral, Daily, Monique Booth MD, 145 mg at 06/22/21 7419    ferrous sulfate tablet 325 mg, 325 mg, Oral, BID, Monique Booth MD, 325 mg at 06/22/21 1815    haloperidol (HALDOL) tablet 1 mg, 1 mg, Oral, Q8H PRN, Monique Booth MD    HYDROmorphone (DILAUDID) tablet 2 mg, 2 mg, Oral, Q4H PRN, Monique Booth MD    hyoscyamine (LEVSIN/SL) SL tablet 0 125 mg, 0 125 mg, Sublingual, Q4H PRN, Monique Booth MD    levothyroxine tablet 112 mcg, 112 mcg, Oral, Early Morning, Tresa Pressley MD, 112 mcg at 06/23/21 0516    LORazepam (ATIVAN) tablet 0 5 mg, 0 5 mg, Oral, HS, Monique Booth MD, 0 5 mg at 06/22/21 2250    metoprolol tartrate (LOPRESSOR) tablet 100 mg, 100 mg, Oral, Q12H [address], Monique Booth MD, 100 mg at 06/22/21 2250    nitroglycerin (NITROSTAT) SL tablet 0 4 mg, 0 4 mg, Sublingual, Q5 Min PRN, Monique Booth MD    ondansetron [facility]) injection 4 mg, 4 mg, Intravenous, Q6H PRN, Monique Booth MD    phenazopyridine (PYRIDIUM) tablet 100 mg, 100 mg, Oral, TID With Meals, Monique Booth MD, 100 mg at 06/22/21 0810    polyethylene glycol (MIRALAX) packet 17 g, 17 g, Oral, Daily PRN, Monique Booth MD    potassium chloride (K-DUR,KLOR-CON) CR tablet 20 mEq, 20 mEq, Oral, Daily, Monique Booth MD, 20 mEq at 06/22/21 9239   senna (SENOKOT) tablet 8 6 mg, 1 tablet, Oral, Daily, Jing Ovalles MD, 8 6 mg at 06/22/21 0855      Physical Exam:   /64   Pulse 55   Temp 98 3 °F (36 8 °C)   Resp 20   Ht 5' 2" (1 575 m)   Wt 76 kg (167 lb 9 6 oz)   SpO2 96%   BMI 30 65 kg/m²   GEN: resting comfortably  RESP: breathing comfortably with no accessory muscle use  CV: no significant peripheral edema  ABD: soft, non-tender, non-distended  INCISION: clean dry and intact  DRAINS: none  BLUE: in place draining clear yellow urine

## 2021-06-23 NOTE — ASSESSMENT & PLAN NOTE
· Presented to [facility] on 6/18 from her facility [facility] with hematuria in her baca  · CT A/P - "High density components within the bladder lumen in keeping with intraluminal bladder hemorrhage  Mild circumferential bladder wall thickening and surrounding fat stranding could indicate cystitis or could be reactive to the hemorrhage "  · On 6/19 underwent cystoscopy which showed a 2 cm nodular mass in the anterolateral wall from which biopsy was taken  Also underwent evacuation if clot and fulguration of bleeding points   Resection not done as she had been on Eliquis  · Was on CBI which was discontinued after hematuria resolved  · Pathology - high grade urothelial carcinoma  · Discussed with Dr Nicole Chirinos - plan for TURBT tomorrow (6/23)  · Monitor H/H  · Transfuse as needed

## 2021-06-23 NOTE — ANESTHESIA POSTPROCEDURE EVALUATION
Post-Op Assessment Note    CV Status:  Stable    Pain management: adequate     Mental Status:  Alert and awake   Hydration Status:  Euvolemic   PONV Controlled:  Controlled   Airway Patency:  Patent      Post Op Vitals Reviewed: Yes      Staff: CRNA         No complications documented      BP (P) 149/83 (06/23/21 1032)    Temp (P) 97 6 °F (36 4 °C) (06/23/21 1032)    Pulse (P) 58 (06/23/21 1032)   Resp (P) 12 (06/23/21 1032)    SpO2 (P) 98 % (06/23/21 1032)

## 2021-06-23 NOTE — PROGRESS NOTES
Progress Note - Nephrology   Kenneth Durham 77 y o  female MRN: [id number]  Unit/Bed#: [facility] 931-01 Encounter: [id number]    ASSESSMENT AND PLAN:  Patient is 46-year-old female with significant medical issues of neurogenic bladder, chronic indwelling Davis catheter, COPD, bladder tumor, CKD stage 3, baseline creatinine 1 3 to 1 5, seizure disorder, presented with gross hematuria  We are consulted for hypernatremia CKD management  1 CKD:     Baseline creatinine:  1 3-1 5   Current creatinine:  0 19 at baseline   Etiology:  Possible obstructive uropathy with neurogenic bladder; arteriolar nephrosclerosis; congenitally absent right kidney  Treatment:  · IV fluids  · Repeat UA once over the acute insult given 2+ proteinuria and innumerable RBCs    2 Volume/hypertension:   Volume:  Possibly mildly volume depleted with poor free water intake and hypernatremia please see below: However patient has abnormal lung exam and history of CHF with low ejection fraction of 45% so I will order a STAT CHEST X-RAY  Lasix is on hold at this time and we are giving hypotonic fluids   Blood pressure: Acceptable      3  Electrolytes:   Hypernatremia:  Was 152 yesterday now down to 145   Continue hypotonic IV fluids, but a lower rate given history of CHF  4  MBD of CKD/AK I:   Calcium normal at 8 8   Magnesium normal at 2 6   Phosphorus normal 3 9    5  Anemia:   Current hemoglobin:  8 0 was 12 5 on admission, related to hematuria/procedure   Check iron studies  Treatment:   Transfuse for hemoglobin less than 7 0 per primary service      6   Other problems:   Major reason for hospitalization:  o Bladder tumor:  High-grade urothelial cell CA associated gross hematuria and a chronic neurogenic bladder/chronic indwelling Davis catheter status post transurethral resection of the bladder tumor on 06/23/2021     Other problems:  o Seizure disorder    o History of systolic CHF clinically euvolemic possible mildly dehydrated:  Ejection fraction 45% in 2019  o Atrial flutter  o PAD  o CAD status post PTCA  o Documented dementia          Subjective:   Patient feels well  Status post transurethral resections of bladder tumor earlier  She has a Davis catheter in place now  She denies any nausea vomiting or diarrhea  She denies any shortness of breath or chest pain  Objective:     Vitals: Blood pressure 148/64, pulse (!) 50, temperature 97 6 °F (36 4 °C), resp  rate 17, height 5' 2" (1 575 m), weight 76 kg (167 lb 9 6 oz), SpO2 99 %  ,Body mass index is 30 65 kg/m²  Weight (last 2 days)     Date/Time   Weight    06/22/21 0600   76 (167 6)    06/21/21 0600   74 4 (164 02)                Intake/Output Summary (Last 24 hours) at 6/23/2021 1236  Last data filed at 6/23/2021 1121  Gross per 24 hour   Intake 1568 ml   Output 150 ml   Net 1418 ml       Urethral Catheter Double-lumen 22 Fr   (Active)   Site Assessment Clean;Skin intact 06/23/21 1100   Collection Container Standard drainage bag 06/23/21 1100   Securement Method Securing device (Describe) 06/23/21 1100       Physical Exam: General:  No acute distress  Skin:  No acute rash  Eyes:  No scleral icterus and noninjected  ENT:  Moist mucous membranes  Neck:  Supple, no jugular venous distention, trachea midline, overall appearance is normal  Chest:  Bilateral basilar crackles, no wheezing or dullness to percussion  CVS:  Regular rate and rhythm, without a rub or gallops  Abdomen:  Normal bowel sounds, soft and nontender and nondistended  Extremities:  No edema, and no cyanosis, no significant arthritic changes  Neuro:  No gross focality  Psych:  Alert and oriented and appropriate                Medications:    Scheduled Meds:  Current Facility-Administered Medications   Medication Dose Route Frequency Provider Last Rate    acetaminophen  650 mg Oral Q8H PRN Kirby Henry MD      albuterol  2 puff Inhalation Q6H PRN Kirby Henry MD      atorvastatin  10 mg Oral After Erica Ramon, MD      belladonna-opium  30 mg Rectal Q8H PRN Nimesh Links, MD      bisacodyl  10 mg Rectal Daily PRN Nimesh Links, MD      buPROPion  150 mg Oral Daily Nimesh Links, MD      carBAMazepine  100 mg Oral TID Nimesh Links, MD      cefazolin  1,000 mg Intravenous On Call To Yamile Kang MD      cholecalciferol  2,000 Units Oral Daily Nimesh Links, MD      dextrose  80 mL/hr Intravenous Continuous Nimesh Links, MD 80 mL/hr (06/23/21 1034)    digoxin  125 mcg Oral Daily Nimesh Links, MD      divalproex sodium  250 mg Oral Q12H [facility] Nimesh Links, MD      famotidine  20 mg Oral Daily Nimesh Links, MD      fenofibrate  145 mg Oral Daily Nimesh Links, MD      ferrous sulfate  325 mg Oral BID Nimesh Links, MD      haloperidol  1 mg Oral Q8H PRN Nimesh Links, MD      HYDROmorphone  2 mg Oral Q4H PRN Nimesh Links, MD      hyoscyamine  0 125 mg Sublingual Q4H PRN Nimesh Links, MD      levothyroxine  112 mcg Oral Early Morning Nimesh Links, MD      LORazepam  0 5 mg Oral HS Nimesh Links, MD      metoprolol tartrate  100 mg Oral Q12H [facility] Nimesh Links, MD      nitroglycerin  0 4 mg Sublingual Q5 Min PRN Nimesh Links, MD      ondansetron  4 mg Intravenous Q6H PRN Nimesh Links, MD      phenazopyridine  100 mg Oral TID With Meals Nimesh Links, MD      polyethylene glycol  17 g Oral Daily PRN Nimesh Links, MD      potassium chloride  20 mEq Oral Daily Nimesh Links, MD      senna  1 tablet Oral Daily Nimesh Links, MD         PRN Meds:   acetaminophen    albuterol    belladonna-opium    bisacodyl    haloperidol    HYDROmorphone    hyoscyamine    nitroglycerin    ondansetron    polyethylene glycol    Continuous Infusions:dextrose, 80 mL/hr, Last Rate: 80 mL/hr (06/23/21 1034)        Lab, Imaging and other studies: I have personally reviewed pertinent labs  Laboratory Results:  Results from last 7 days   Lab Units 06/23/21  0556 06/22/21  4192 06/21/21  0631 06/20/21  0646 06/19/21  0738 06/18/21  1525   WBC Thousand/uL  --  6 04 5 88 6 24 9 08 6 54   HEMOGLOBIN g/dL  --  8 0* 8 3* 8 8* 10 4* 12 5   HEMATOCRIT %  --  26 0* 26 3* 28 1* 33 0* 38 5   PLATELETS Thousands/uL  --  142* 132* 151 166 188   POTASSIUM mmol/L 4 2 3 9 4 0 4 0 3 9 4 4   CHLORIDE mmol/L 113* 116* 119* 115* 113* 106   CO2 mmol/L 28 31 29 31 33* 35*   BUN mg/dL 20 24 31* 33* 37* 32*   CREATININE mg/dL 1 19 1 36* 1 39* 1 34* 1 56* 1 56*   CALCIUM mg/dL 8 8 8 8 9 0 8 8 9 4 9 4   MAGNESIUM mg/dL  --   --   --   --  2 6  --    PHOSPHORUS mg/dL  --   --   --   --  3 9  --      Urinalysis:   Lab Results   Component Value Date    COLORU Red 06/18/2021    CLARITYU Turbid 06/18/2021    SPECGRAV 1 010 06/18/2021    PHUR 6 5 06/18/2021    PHUR 6 5 01/24/2019    LEUKOCYTESUR Small (A) 06/18/2021    NITRITE Negative 06/18/2021    GLUCOSEU Negative 06/18/2021    KETONESU Negative 06/18/2021    BILIRUBINUR Negative 06/18/2021    BLOODU Large (A) 06/18/2021     ABGs: No results found for: [facility]  Radiology review:     Portions of the record may have been created with voice recognition software  Occasional wrong word or "sound a like" substitutions may have occurred due to the inherent limitations of voice recognition software  Read the chart carefully and recognize, using context, where substitutions have occurred

## 2021-06-23 NOTE — ANESTHESIA PREPROCEDURE EVALUATION
Procedure:  TRANSURETHRAL RESECTION OF BLADDER TUMOR (TURBT) (N/A Bladder)    Relevant Problems   CARDIO   (+) Aortoiliac occlusive disease (HCC)   (+) Atrial fibrillation (HCC)   (+) Coronary artery disease involving native coronary artery of native heart without angina pectoris   (+) Essential hypertension   (+) Hyperlipidemia   (+) Paroxysmal atrial fibrillation (HCC)   (+) Paroxysmal atrial flutter (HCC)      ENDO   (+) Hypothyroidism      /RENAL   (+) TAM (acute kidney injury) (HCC)   (+) Hypertensive kidney disease with stage 3 chronic kidney disease (HCC)   (+) Stage 3 chronic kidney disease      HEMATOLOGY   (+) Acute blood loss anemia   (+) Coagulopathy (HCC)   (+) Macrocytic anemia      MUSCULOSKELETAL   (+) Back pain      NEURO/PSYCH   (+) Dementia (HCC)   (+) Intermittent claudication of right lower extremity due to atherosclerosis (HCC)      PULMONARY   (+) Acute on chronic respiratory failure with hypoxia (HCC)   (+) COPD (chronic obstructive pulmonary disease) (HCC)   (+) HCAP (healthcare-associated pneumonia)   (+) JESENIA (obstructive sleep apnea)   (+) Pneumonia due to COVID-19 virus        Physical Exam    Airway    Mallampati score: II         Dental   No notable dental hx     Cardiovascular      Pulmonary      Other Findings        Anesthesia Plan  ASA Score- 3     Anesthesia Type- general with ASA Monitors  Additional Monitors:   Airway Plan: LMA  Comment: I, Dr Marcelino Cranker, the attending physician, have personally seen and evaluated the patient prior to anesthetic care  I have reviewed the pre-anesthetic record, and other medical records if appropriate to the anesthetic care  If a CRNA is involved in the case, I have reviewed the CRNA assessment, if present, and agree  The patient is in a suitable condition to proceed with my formulated anesthetic plan          Plan Factors-    Chart reviewed  Induction- intravenous      Postoperative Plan-     Informed Consent- Anesthetic plan and risks discussed with patient  I personally reviewed this patient with the CRNA  Discussed and agreed on the Anesthesia Plan with the CRNA  Donell Freeman

## 2021-06-23 NOTE — PLAN OF CARE
Problem: MOBILITY - ADULT  Goal: Maintain or return to baseline ADL function  Description: INTERVENTIONS:  -  Assess patient's ability to carry out ADLs; assess patient's baseline for ADL function and identify physical deficits which impact ability to perform ADLs (bathing, care of mouth/teeth, toileting, grooming, dressing, etc )  - Assess/evaluate cause of self-care deficits   - Assess range of motion  - Assess patient's mobility; develop plan if impaired  - Assess patient's need for assistive devices and provide as appropriate  - Encourage maximum independence but intervene and supervise when necessary  - Involve family in performance of ADLs  - Assess for home care needs following discharge   - Consider OT consult to assist with ADL evaluation and planning for discharge  - Provide patient education as appropriate  Outcome: Progressing  Goal: Maintains/Returns to pre admission functional level  Description: INTERVENTIONS:  - Perform BMAT or MOVE assessment daily    - Set and communicate daily mobility goal to care team and patient/family/caregiver  - Collaborate with rehabilitation services on mobility goals if consulted  - Perform Range of [id number] a day  - Reposition patient [id number]    - Out of bed for toileting  - Record patient progress and toleration of activity level   Outcome: Progressing     Problem: Potential for Falls  Goal: Patient will remain free of falls  Description: INTERVENTIONS:  - Educate patient/family on patient safety including physical limitations  - Instruct patient to call for assistance with activity   - Consult OT/PT to assist with strengthening/mobility   - Keep Call bell within reach  - Keep bed low and locked with side rails adjusted as appropriate  - Keep care items and personal belongings within reach  - Initiate and maintain comfort rounds  - Make Fall Risk Sign visible to staff  - Apply yellow socks and bracelet for high fall risk patients  - Consider moving patient to room near nurses station  Outcome: Progressing     Problem: Prexisting or High Potential for Compromised Skin Integrity  Goal: Skin integrity is maintained or improved  Description: INTERVENTIONS:  - Identify patients at risk for skin breakdown  - Assess and monitor skin integrity  - Assess and monitor nutrition and hydration status  - Monitor labs   - Assess for incontinence   - Turn and reposition patient  - Assist with mobility/ambulation  - Relieve pressure over bony prominences  - Avoid friction and shearing  - Provide appropriate hygiene as needed including keeping skin clean and dry  - Evaluate need for skin moisturizer/barrier cream  - Collaborate with interdisciplinary team   - Patient/family teaching  - Consider wound care consult   Outcome: Progressing     Problem: PAIN - ADULT  Goal: Verbalizes/displays adequate comfort level or baseline comfort level  Description: Interventions:  - Encourage patient to monitor pain and request assistance  - Assess pain using appropriate pain scale  - Administer analgesics based on type and severity of pain and evaluate response  - Implement non-pharmacological measures as appropriate and evaluate response  - Consider cultural and social influences on pain and pain management  - Notify physician/advanced practitioner if interventions unsuccessful or patient reports new pain  Outcome: Progressing     Problem: INFECTION - ADULT  Goal: Absence or prevention of progression during hospitalization  Description: INTERVENTIONS:  - Assess and monitor for signs and symptoms of infection  - Monitor lab/diagnostic results  - Monitor all insertion sites, i e  indwelling lines, tubes, and drains  - Monitor endotracheal if appropriate and nasal secretions for changes in amount and color  - [place] appropriate cooling/warming therapies per order  - Administer medications as ordered  - Instruct and encourage patient and family to use good hand hygiene technique  - Identify and instruct in appropriate isolation precautions for identified infection/condition  Outcome: Progressing  Goal: Absence of fever/infection during neutropenic period  Description: INTERVENTIONS:  - Monitor WBC    Outcome: Progressing     Problem: SAFETY ADULT  Goal: Patient will remain free of falls  Description: INTERVENTIONS:  - Educate patient/family on patient safety including physical limitations  - Instruct patient to call for assistance with activity   - Consult OT/PT to assist with strengthening/mobility   - Keep Call bell within reach  - Keep bed low and locked with side rails adjusted as appropriate  - Keep care items and personal belongings within reach  - Initiate and maintain comfort rounds  - Make Fall Risk Sign visible to staff  - Apply yellow socks and bracelet for high fall risk patients  - Consider moving patient to room near nurses station  Outcome: Progressing     Problem: DISCHARGE PLANNING  Goal: Discharge to home or other facility with appropriate resources  Description: INTERVENTIONS:  - Identify barriers to discharge w/patient and caregiver  - Arrange for needed discharge resources and transportation as appropriate  - Identify discharge learning needs (meds, wound care, etc )  - Arrange for interpretive services to assist at discharge as needed  - Refer to Case Management Department for coordinating discharge planning if the patient needs post-hospital services based on physician/advanced practitioner order or complex needs related to functional status, cognitive ability, or social support system  Outcome: Progressing     Problem: Knowledge Deficit  Goal: Patient/family/caregiver demonstrates understanding of disease process, treatment plan, medications, and discharge instructions  Description: Complete learning assessment and assess knowledge base    Interventions:  - Provide teaching at level of understanding  - Provide teaching via preferred learning methods  Outcome: Progressing     Problem: GENITOURINARY - ADULT  Goal: Urinary catheter remains patent  Description: INTERVENTIONS:  - Assess patency of urinary catheter  - If patient has a chronic baca, consider changing catheter if non-functioning  - Follow guidelines for intermittent irrigation of non-functioning urinary catheter  Outcome: Progressing     Problem: METABOLIC, FLUID AND ELECTROLYTES - ADULT  Goal: Electrolytes maintained within normal limits  Description: INTERVENTIONS:  - Monitor labs and assess patient for signs and symptoms of electrolyte imbalances  - Administer electrolyte replacement as ordered  - Monitor response to electrolyte replacements, including repeat lab results as appropriate  - Instruct patient on fluid and nutrition as appropriate  Outcome: Progressing  Goal: Fluid balance maintained  Description: INTERVENTIONS:  - Monitor labs   - Monitor I/O and WT  - Instruct patient on fluid and nutrition as appropriate  - Assess for signs & symptoms of volume excess or deficit  Outcome: Progressing     Problem: SKIN/TISSUE INTEGRITY - ADULT  Goal: Skin Integrity remains intact(Skin Breakdown Prevention)  Description: Assess:  -Perform Surinder assessment every day  -Inspect skin when repositioning, toileting, and assisting with ADLS  Assess extremities for adequate circulation and sensation     Bed Management:  -Have minimal linens on bed & keep smooth, unwrinkled  -Change linens as needed when moist or perspirin    Toileting:  -Offer bedside commode  -Assess for incontinence every 2  -Use incontinent care products after each incontinent episode such as cream    Activity:  -Encourage activity and walks on unit  -Encourage or provide ROM exercises   -Turn and reposition patient every 2 Hours  -Use appropriate equipment to lift or move patient in bed        Skin Care:  -Avoid use of baby powder, tape, friction and shearing, hot water or constrictive clothing  -Relieve pressure over bony prominences using turn  -Do not massage red bony areas    Next Steps:  -Teach patient strategies to minimize risks such as    -Consider consults to  interdisciplinary teams such as Outcome: Progressing     Problem: HEMATOLOGIC - ADULT  Goal: Maintains hematologic stability  Description: INTERVENTIONS  - Assess for signs and symptoms of bleeding or hemorrhage  - Monitor labs  - Administer supportive blood products/factors as ordered and appropriate  Outcome: Progressing

## 2021-06-23 NOTE — TELEPHONE ENCOUNTER
Lm for facility to c/b and schedule  [___ Week(s)] : [unfilled] week(s) [With Me] : with me [FreeTextEntry1] : inr 1.7- 2x tonight then same

## 2021-06-23 NOTE — TELEPHONE ENCOUNTER
Per patient chart she has a high allergy to iodine diagnostic contrast , checked through patients charts & she has not had a CT with IV contrast since 2018 & the allergy was added 1/16/2020  I tiger texted ordering MD & was notified that patient does not have this allergy, but it is in the system as a precaution due to her renal insufficiency  I advised md to to either provide documentation supporting this , or to called the reading room & get approval from a radiologist      MD advised me to contact a Dr Vieira from [facility] , I am unaware of who this is or why I have to contact them but I asked MD for the providers first name & have not received a response  I also asked md why I need to contact another doctor & I also have not received a response

## 2021-06-24 LAB
ANION GAP SERPL CALCULATED.3IONS-SCNC: 4 MMOL/L (ref 4–13)
BUN SERPL-MCNC: 15 MG/DL (ref 5–25)
CALCIUM SERPL-MCNC: 8.8 MG/DL (ref 8.3–10.1)
CHLORIDE SERPL-SCNC: 108 MMOL/L (ref 100–108)
CO2 SERPL-SCNC: 31 MMOL/L (ref 21–32)
CREAT SERPL-MCNC: 1.15 MG/DL (ref 0.6–1.3)
ERYTHROCYTE [DISTWIDTH] IN BLOOD BY AUTOMATED COUNT: 13.5 % (ref 11.6–15.1)
GFR SERPL CREATININE-BSD FRML MDRD: 47 ML/MIN/1.73SQ M
GLUCOSE SERPL-MCNC: 119 MG/DL (ref 65–140)
HCT VFR BLD AUTO: 24.4 % (ref 34.8–46.1)
HGB BLD-MCNC: 7.9 G/DL (ref 11.5–15.4)
MCH RBC QN AUTO: 34.6 PG (ref 26.8–34.3)
MCHC RBC AUTO-ENTMCNC: 32.4 G/DL (ref 31.4–37.4)
MCV RBC AUTO: 107 FL (ref 82–98)
PLATELET # BLD AUTO: 150 THOUSANDS/UL (ref 149–390)
PMV BLD AUTO: 11.8 FL (ref 8.9–12.7)
POTASSIUM SERPL-SCNC: 3.8 MMOL/L (ref 3.5–5.3)
RBC # BLD AUTO: 2.28 MILLION/UL (ref 3.81–5.12)
SARS-COV-2 RNA RESP QL NAA+PROBE: NEGATIVE
SODIUM SERPL-SCNC: 143 MMOL/L (ref 136–145)
WBC # BLD AUTO: 5.6 THOUSAND/UL (ref 4.31–10.16)

## 2021-06-24 PROCEDURE — U0003 INFECTIOUS AGENT DETECTION BY NUCLEIC ACID (DNA OR RNA); SEVERE ACUTE RESPIRATORY SYNDROME CORONAVIRUS 2 (SARS-COV-2) (CORONAVIRUS DISEASE [COVID-19]), AMPLIFIED PROBE TECHNIQUE, MAKING USE OF HIGH THROUGHPUT TECHNOLOGIES AS DESCRIBED BY CMS-2020-01-R: HCPCS | Performed by: INTERNAL MEDICINE

## 2021-06-24 PROCEDURE — U0005 INFEC AGEN DETEC AMPLI PROBE: HCPCS | Performed by: INTERNAL MEDICINE

## 2021-06-24 PROCEDURE — 85027 COMPLETE CBC AUTOMATED: CPT | Performed by: INTERNAL MEDICINE

## 2021-06-24 PROCEDURE — 99232 SBSQ HOSP IP/OBS MODERATE 35: CPT | Performed by: PHYSICIAN ASSISTANT

## 2021-06-24 PROCEDURE — 99232 SBSQ HOSP IP/OBS MODERATE 35: CPT | Performed by: INTERNAL MEDICINE

## 2021-06-24 PROCEDURE — 80048 BASIC METABOLIC PNL TOTAL CA: CPT | Performed by: INTERNAL MEDICINE

## 2021-06-24 RX ADMIN — METOPROLOL TARTRATE 100 MG: 50 TABLET, FILM COATED ORAL at 09:23

## 2021-06-24 RX ADMIN — CARBAMAZEPINE 100 MG: 100 TABLET, CHEWABLE ORAL at 18:30

## 2021-06-24 RX ADMIN — CARBAMAZEPINE 100 MG: 100 TABLET, CHEWABLE ORAL at 09:22

## 2021-06-24 RX ADMIN — Medication 2000 UNITS: at 09:23

## 2021-06-24 RX ADMIN — BUPROPION HYDROCHLORIDE 150 MG: 150 TABLET, FILM COATED, EXTENDED RELEASE ORAL at 09:22

## 2021-06-24 RX ADMIN — PHENAZOPYRIDINE 100 MG: 100 TABLET ORAL at 09:22

## 2021-06-24 RX ADMIN — POTASSIUM CHLORIDE 20 MEQ: 1500 TABLET, EXTENDED RELEASE ORAL at 09:23

## 2021-06-24 RX ADMIN — FERROUS SULFATE TAB 325 MG (65 MG ELEMENTAL FE) 325 MG: 325 (65 FE) TAB at 09:23

## 2021-06-24 RX ADMIN — PHENAZOPYRIDINE 100 MG: 100 TABLET ORAL at 13:04

## 2021-06-24 RX ADMIN — DIVALPROEX SODIUM 250 MG: 250 TABLET, DELAYED RELEASE ORAL at 09:22

## 2021-06-24 RX ADMIN — PHENAZOPYRIDINE 100 MG: 100 TABLET ORAL at 18:30

## 2021-06-24 RX ADMIN — FAMOTIDINE 20 MG: 20 TABLET ORAL at 09:23

## 2021-06-24 RX ADMIN — FERROUS SULFATE TAB 325 MG (65 MG ELEMENTAL FE) 325 MG: 325 (65 FE) TAB at 18:30

## 2021-06-24 RX ADMIN — DIGOXIN 125 MCG: 125 TABLET ORAL at 09:22

## 2021-06-24 RX ADMIN — LEVOTHYROXINE SODIUM 112 MCG: 112 TABLET ORAL at 06:55

## 2021-06-24 RX ADMIN — ATORVASTATIN CALCIUM 10 MG: 10 TABLET, FILM COATED ORAL at 18:30

## 2021-06-24 RX ADMIN — SENNOSIDES 8.6 MG: 8.6 TABLET, FILM COATED ORAL at 09:23

## 2021-06-24 NOTE — CASE MANAGEMENT
CM spoke with pt's dtr and notified her of d/c and d/c time  CM reviewed IMM  Pt's dtr is in agreement with d/c plan  CM will follow

## 2021-06-24 NOTE — ASSESSMENT & PLAN NOTE
· Presented to [facility] on 6/18 from her facility [facility] with hematuria in her baca  · CT A/P - "High density components within the bladder lumen in keeping with intraluminal bladder hemorrhage  Mild circumferential bladder wall thickening and surrounding fat stranding could indicate cystitis or could be reactive to the hemorrhage "  · On 6/19 underwent cystoscopy which showed a 2 cm nodular mass in the anterolateral wall from which biopsy was taken  Also underwent evacuation if clot and fulguration of bleeding points   Resection not done as she had been on Eliquis  · Was on CBI which was discontinued after hematuria resolved  · Pathology - high grade urothelial carcinoma  · Status post transurethral resection of bladder tumor today  · Discussed with Dr Allyson Almodovar - possible discharge tomorrow

## 2021-06-24 NOTE — PROGRESS NOTES
[address] [facility]  Progress Note Barbara Riding [birth year], 77 y o  female MRN: [id number]  Unit/Bed#: [facility] 931-01 Encounter: [id number]  Primary Care Provider: Jhoana Ervin DO   Date and time admitted to hospital: 6/18/2021 11:42 PM    * Gross hematuria  Assessment & Plan  · Presented to [facility] on 6/18 from her facility [facility] with hematuria in her baca  · CT A/P - "High density components within the bladder lumen in keeping with intraluminal bladder hemorrhage  Mild circumferential bladder wall thickening and surrounding fat stranding could indicate cystitis or could be reactive to the hemorrhage "  · On 6/19 underwent cystoscopy which showed a 2 cm nodular mass in the anterolateral wall from which biopsy was taken  Also underwent evacuation if clot and fulguration of bleeding points   Resection not done as she had been on Eliquis  · Was on CBI which was discontinued after hematuria resolved  · Pathology - high grade urothelial carcinoma  · Status post transurethral resection of bladder tumor today  · Discussed with Dr Sanjuana Escobedo - possible discharge tomorrow         Acute blood loss anemia  Assessment & Plan  · Due to hematuria  · Drop in Hb from 12 5 on 06/18 to 8   · Eliquis held  · Monitor H/H  · Transfuse as needed      Coagulopathy [facility])  Assessment & Plan  Due to Eliquis which has been held     Paroxysmal atrial fibrillation (HCC)  Assessment & Plan  · Eliquis held due to hematuria with acute blood loss anemia  · Ct BB, digoxin      Coronary artery disease involving native coronary artery of native heart without angina pectoris  Assessment & Plan  · ASA held due to hematuria  · Ct BB    Chronic diastolic heart failure (HCC)  Assessment & Plan  Wt Readings from Last 3 Encounters:   06/22/21 76 kg (167 lb 9 6 oz)   06/18/21 81 5 kg (179 lb 10 8 oz)   06/01/21 78 4 kg (172 lb 13 5 oz)     Euvolemic at present  Lasix held due to hypernatremia      COPD (chronic obstructive pulmonary disease) (HCC)  Assessment & Plan  Respiratory protocol    Hypernatremia  Assessment & Plan  Resolved with hypotonic fluids  Lasix held  Monitor           VTE Pharmacologic Prophylaxis: VTE Score: 6 High Risk (Score >/= 5) - Pharmacological DVT Prophylaxis Contraindicated  Sequential Compression Devices Ordered  Patient Centered Rounds: Discussed with RN  Discussions with Specialists or Other Care Team Provider:  Discussed with  Dr Allyson Almodovar    Education and Discussions with Family / Patient: Updated  (daughter) via phone  Time Spent for Care: 20 minutes  More than 50% of total time spent on counseling and coordination of care as described above  Current Length of Stay: 4 day(s)  Current Patient Status: Inpatient   Certification Statement: The patient will continue to require additional inpatient hospital stay due to Status post TURBT requiring monitoring    Code Status: Level 3 - DNAR and DNI    Subjective:   No abdominal pain or hematuria    Objective:     Vitals:   Temp (24hrs), Av 9 °F (36 6 °C), Min:97 6 °F (36 4 °C), Max:98 5 °F (36 9 °C)    Temp:  [97 6 °F (36 4 °C)-98 5 °F (36 9 °C)] 97 6 °F (36 4 °C)  HR:  [50-65] 65  Resp:  [12-33] 17  BP: (127-154)/(60-94) 154/66  SpO2:  [90 %-99 %] 90 %  Body mass index is 30 65 kg/m²  Physical Exam:   Physical Exam  Vitals reviewed  HENT:      Head: Normocephalic  Nose: Nose normal       Mouth/Throat:      Mouth: Mucous membranes are moist    Eyes:      Extraocular Movements: Extraocular movements intact  Cardiovascular:      Rate and Rhythm: Normal rate and regular rhythm  Pulmonary:      Effort: Pulmonary effort is normal  No respiratory distress  Breath sounds: Normal breath sounds  No wheezing  Abdominal:      General: Bowel sounds are normal  There is no distension  Palpations: Abdomen is soft  Musculoskeletal:         General: No swelling  Cervical back: Neck supple     Skin: General: Skin is warm and dry  Neurological:      General: No focal deficit present  Mental Status: She is alert  Additional Data:     Labs:  Results from last 7 days   Lab Units 06/22/21  0611   WBC Thousand/uL 6 04   HEMOGLOBIN g/dL 8 0*   HEMATOCRIT % 26 0*   PLATELETS Thousands/uL 142*   NEUTROS PCT % 36*   LYMPHS PCT % 45*   MONOS PCT % 9   EOS PCT % 5     Results from last 7 days   Lab Units 06/23/21  0556 06/19/21  0738   SODIUM mmol/L 145 148*   POTASSIUM mmol/L 4 2 3 9   CHLORIDE mmol/L 113* 113*   CO2 mmol/L 28 33*   BUN mg/dL 20 37*   CREATININE mg/dL 1 19 1 56*   ANION GAP mmol/L 4 2*   CALCIUM mg/dL 8 8 9 4   ALBUMIN g/dL  --  3 1*   TOTAL BILIRUBIN mg/dL  --  0 32   ALK PHOS U/L  --  77   ALT U/L  --  21   AST U/L  --  14   GLUCOSE RANDOM mg/dL 138 159*     Results from last 7 days   Lab Units 06/19/21  0738   INR  1 19                   Lines/Drains:  Invasive Devices     Peripheral Intravenous Line            Peripheral IV 06/22/21 Left; Lower Arm 1 day          Drain            Urethral Catheter Double-lumen 22 Fr  <1 day              Urinary Catheter:  Goal for removal: N/A - Chronic Davis               Recent Cultures (last 7 days):   Results from last 7 days   Lab Units 06/18/21  1746   URINE CULTURE  <10,000 cfu/ml        Last 24 Hours Medication List:   Current Facility-Administered Medications   Medication Dose Route Frequency Provider Last Rate    acetaminophen  650 mg Oral Q8H PRN Louisa Daniel MD      albuterol  2 puff Inhalation Q6H PRN Louisa Daniel MD      atorvastatin  10 mg Oral After Cyrena Merlin, MD      belladonna-opium  30 mg Rectal Q8H PRN Louisa Daniel MD      bisacodyl  10 mg Rectal Daily PRN Louisa Daniel MD      buPROPion  150 mg Oral Daily Louisa Daniel MD      carBAMazepine  100 mg Oral TID Louisa Daniel MD      cefazolin  1,000 mg Intravenous On Call To MD Mynor Arciniega cholecalciferol  2,000 Units Oral Daily Elieser MD Maria      dextrose  60 mL/hr Intravenous Continuous Lorin Ivey MD 60 mL/hr (06/23/21 1250)    digoxin  125 mcg Oral Daily Elieser Hembinta, MD      divalproex sodium  250 mg Oral Q12H [address] Elieser MD Maria      famotidine  20 mg Oral Daily Elieser Maria, MD      fenofibrate  145 mg Oral Daily Elieser Hembinta, MD      ferrous sulfate  325 mg Oral BID Elieser Hembinta, MD      haloperidol  1 mg Oral Q8H PRN Elieser Hembinta, MD      HYDROmorphone  2 mg Oral Q4H PRN Elieser Hembinta, MD      hyoscyamine  0 125 mg Sublingual Q4H PRN Elieser Hembinta, MD      levothyroxine  112 mcg Oral Early Morning Elieser Hembinta, MD      LORazepam  0 5 mg Oral HS Elieser Hembinta, MD      metoprolol tartrate  100 mg Oral Q12H [address] Elieser Maria, MD      nitroglycerin  0 4 mg Sublingual Q5 Min PRN Elieser Hembinta, MD      ondansetron  4 mg Intravenous Q6H PRN Elieser Hembinta, MD      phenazopyridine  100 mg Oral TID With Meals Elieser Sifuentes MD      polyethylene glycol  17 g Oral Daily PRN Elieser Hembinta, MD      potassium chloride  20 mEq Oral Daily Elieser Hembinta, MD      senna  1 tablet Oral Daily Elieser HemMD binta          Today, Patient Was Seen By: Tomas Gill MD    **Please Note: This note may have been constructed using a voice recognition system  **

## 2021-06-24 NOTE — CASE MANAGEMENT
Cm spoke with Sarah Pelaez ([phone]) from [facility] and notified her that pt will be d/c tomorrow and that she will have an 11:30 AM  CM will follow

## 2021-06-24 NOTE — PROGRESS NOTES
Progress Note - Nephrology   Rian July 77 y o  female MRN: [id number]  Unit/Bed#: [facility] 931-01 Encounter: [id number]    ASSESSMENT AND PLAN:  Patient is 43-year-old female with significant medical issues of neurogenic bladder, chronic indwelling Davis catheter, COPD, bladder tumor, CKD stage 3, baseline creatinine 1 3 to 1 5, seizure disorder, presented with gross hematuria   We are consulted for hypernatremia CKD management      1  CKD:    · Baseline creatinine:  1 3-1 5  · Current creatinine:   1 15 at baseline  · Etiology:  Possible obstructive uropathy with neurogenic bladder; arteriolar nephrosclerosis; congenitally absent right kidney  Treatment:  · IV fluids:  Hep-Lock at this time  · Repeat UA once over the acute insult given 2+ proteinuria and innumerable RBCs     2 Volume/hypertension:  · Volume:  euvolemic  · Blood pressure: Acceptable        3  Electrolytes:  · Hypernatremia:  Has resolved     4  MBD of CKD/AK I:  · Calcium normal at 8 8  · Magnesium normal at 2 6  · Phosphorus normal 3 9     5  Anemia:  · Current hemoglobin:   7 9 was 12 5 on admission, related to hematuria/procedure  · Check iron studies:  Pending  Treatment:  · Transfuse for hemoglobin less than 7 0 per primary service       6  Other problems:  · Major reason for hospitalization:  ? Bladder tumor:  High-grade urothelial cell CA associated gross hematuria and a chronic neurogenic bladder/chronic indwelling Davis catheter status post transurethral resection of the bladder tumor on 06/23/2021     · Other problems:  ? Seizure disorder    ? History of systolic CHF clinically euvolemic possible mildly dehydrated:  Ejection fraction 45% in 2019  ? Atrial flutter  ? PAD  ? CAD status post PTCA  ? Documented dementia     Patient to be discharged today  Will arrange outpatient follow-up  Subjective:   Patient is asymptomatic today  She is aware she is in hospital   She denies any chest pain or shortness of breath    No nausea vomiting or diarrhea  She has a Davis catheter in place  Objective:     Vitals: Blood pressure 132/57, pulse 62, temperature 98 1 °F (36 7 °C), resp  rate 17, height 5' 2" (1 575 m), weight 76 kg (167 lb 8 8 oz), SpO2 96 %  ,Body mass index is 30 65 kg/m²  Weight (last 2 days)     Date/Time   Weight    06/24/21 0600   76 (167 55)    06/22/21 0600   76 (167 6)                Intake/Output Summary (Last 24 hours) at 6/24/2021 1131  Last data filed at 6/24/2021 0558  Gross per 24 hour   Intake 120 ml   Output 900 ml   Net -780 ml       Urethral Catheter Double-lumen 22 Fr  (Active)   Site Assessment Clean;Skin intact 06/24/21 0001   Davis Care Done 06/24/21 0001   Collection Container Standard drainage bag 06/24/21 0001   Securement Method Securing device (Describe) 06/23/21 1100   Output (mL) 150 mL 06/24/21 0558       Physical Exam: General:  No acute distress  Skin:  No acute rash  Eyes:  No scleral icterus and noninjected  ENT:  Moist mucous membranes  Neck:  Supple, no jugular venous distention, trachea midline, overall appearance is normal  Chest:  Clear to auscultation!   CVS:  Regular rate and rhythm, without a rub or gallops  Abdomen:  Normal bowel sounds, soft and nontender and nondistended  Extremities:  No edema, and no cyanosis, no significant arthritic changes  Neuro:  No gross focality  Psych:  Alert and oriented and appropriate                Medications:    Scheduled Meds:  Current Facility-Administered Medications   Medication Dose Route Frequency Provider Last Rate    acetaminophen  650 mg Oral Q8H PRN Susanna Maxwell MD      albuterol  2 puff Inhalation Q6H PRN Susanna Maxwell MD      atorvastatin  10 mg Oral After Foster Pouch, MD      belladonna-opium  30 mg Rectal Q8H PRN Susanna Maxwell MD      bisacodyl  10 mg Rectal Daily PRN Susanna Maxwell MD      buPROPion  150 mg Oral Daily Susanna Maxwell MD      carBAMazepine  100 mg Oral TID Susanna Maxwell MD      cholecalciferol  2,000 Units Oral Daily Renuka Coulter MD      dextrose  60 mL/hr Intravenous Continuous Shashi Rao MD 60 mL/hr (06/23/21 1250)    digoxin  125 mcg Oral Daily Renuka Coulter MD      divalproex sodium  250 mg Oral Q12H [address] Renuka Coulter MD      famotidine  20 mg Oral Daily Renuka Coulter MD      fenofibrate  145 mg Oral Daily Renuka Coulter MD      ferrous sulfate  325 mg Oral BID Renuka Coulter MD      haloperidol  1 mg Oral Q8H PRN Renuka Coulter MD      HYDROmorphone  2 mg Oral Q4H PRN Renuka Coulter MD      hyoscyamine  0 125 mg Sublingual Q4H PRN Renuka Coulter MD      levothyroxine  112 mcg Oral Early Morning Renuka Coulter MD      LORazepam  0 5 mg Oral HS Renuka Coulter MD      metoprolol tartrate  100 mg Oral Q12H [address] Renuka Coulter MD      nitroglycerin  0 4 mg Sublingual Q5 Min PRN Renuka Coulter MD      ondansetron  4 mg Intravenous Q6H PRN Renuka Coulter MD      phenazopyridine  100 mg Oral TID With Meals Renuka Coulter MD      polyethylene glycol  17 g Oral Daily PRN Renuka Coulter MD      potassium chloride  20 mEq Oral Daily Renuka Coulter MD      senna  1 tablet Oral Daily Renuka Coulter MD         PRN Meds:   acetaminophen    albuterol    belladonna-opium    bisacodyl    haloperidol    HYDROmorphone    hyoscyamine    nitroglycerin    ondansetron    polyethylene glycol    Continuous Infusions:dextrose, 60 mL/hr, Last Rate: 60 mL/hr (06/23/21 1250)        Lab, Imaging and other studies: I have personally reviewed pertinent labs    Laboratory Results:  Results from last 7 days   Lab Units 06/24/21  0630 06/24/21  0601 06/23/21  0556 06/22/21  6200 06/21/21  0631 06/20/21  0646 06/19/21  0738 06/18/21  1525   WBC Thousand/uL  --  5 60  --  6 04 5 88 6 24 9 08 6 54   HEMOGLOBIN g/dL  --  7 9*  --  8 0* 8 3* 8 8* 10 4* 12 5   HEMATOCRIT %  -- 24 4*  --  26 0* 26 3* 28 1* 33 0* 38 5   PLATELETS Thousands/uL  --  150  --  142* 132* 151 166 188   POTASSIUM mmol/L 3 8  --  4 2 3 9 4 0 4 0 3 9 4 4   CHLORIDE mmol/L 108  --  113* 116* 119* 115* 113* 106   CO2 mmol/L 31  --  28 31 29 31 33* 35*   BUN mg/dL 15  --  20 24 31* 33* 37* 32*   CREATININE mg/dL 1 15  --  1 19 1 36* 1 39* 1 34* 1 56* 1 56*   CALCIUM mg/dL 8 8  --  8 8 8 8 9 0 8 8 9 4 9 4   MAGNESIUM mg/dL  --   --   --   --   --   --  2 6  --    PHOSPHORUS mg/dL  --   --   --   --   --   --  3 9  --      Urinalysis:   Lab Results   Component Value Date    COLORU Red 06/18/2021    CLARITYU Turbid 06/18/2021    SPECGRAV 1 010 06/18/2021    PHUR 6 5 06/18/2021    PHUR 6 5 01/24/2019    LEUKOCYTESUR Small (A) 06/18/2021    NITRITE Negative 06/18/2021    GLUCOSEU Negative 06/18/2021    KETONESU Negative 06/18/2021    BILIRUBINUR Negative 06/18/2021    BLOODU Large (A) 06/18/2021     ABGs: No results found for: [facility]  Radiology review:     Portions of the record may have been created with voice recognition software  Occasional wrong word or "sound a like" substitutions may have occurred due to the inherent limitations of voice recognition software  Read the chart carefully and recognize, using context, where substitutions have occurred

## 2021-06-24 NOTE — PROGRESS NOTES
Progress Note - Urology  Jet Angulo 77 y o  female MRN: [id number]  Unit/Bed#: [facility] 931-01 Encounter: [id number]    Assessment & Plan:    Bladder mass:  -postop day 1 transurethral resection of bladder tumor with Dr Mack Choudhary   -intraoperative findings revealed lesion appearance to be papillary number of foci were 1 aggregate tumor diameter was 7 cm along with largest individual tumor being 7 cm  Location was primarily the right anterolateral wall  Clinical staging T2-T3 as intra opting findings are clearly consistent muscle invasive disease  -CT scan of chest abdomen and pelvis were obtained for staging, CT scan currently negative for any metastatic disease  Patient does have numerous small pulmonary nodules which appear to be stable and likely benign and probably a sequela of previous granulomatous disease   -patient will follow-up outpatient in 2 weeks for pathology discussion  Discussions for palliative radiation were discussed with family  This is to be discussed further at outpatient pathology appointment   -patient currently experiencing punch colored urine  No clot seen  Would recommend manual irrigation, Q shift   -patient also on Pyridium changing urine culture color darker orange  No additional  intervention  Patient is deemed stable for discharge from urologic standpoint  Anticoagulation may be restarted once patient's urine has been either light pink in color or clear for 24 hours  This may be restarted in facility if needed  Urology will be signing off  Patient will be seen outpatient for pathology discussion  Subjective/Objective   Chief Complaint:  None     Subjective:   Patient currently reporting that she is doing well  She denies any pain, nausea, vomiting, fevers, chills  She reports that her urine in her catheter is slightly darker but she is not concerned about this  She denies any additional complaints at this time      Objective:     Blood pressure 123/51, pulse 59, temperature 97 6 °F (36 4 °C), resp  rate 18, height 5' 2" (1 575 m), weight 76 kg (167 lb 8 8 oz), SpO2 90 %  ,Body mass index is 30 65 kg/m²  Intake/Output Summary (Last 24 hours) at 6/24/2021 1802  Last data filed at 6/24/2021 1401  Gross per 24 hour   Intake 360 ml   Output 1175 ml   Net -815 ml       Invasive Devices     Peripheral Intravenous Line            Peripheral IV 06/22/21 Left; Lower Arm 2 days          Drain            Urethral Catheter Double-lumen 22 Fr  1 day                Physical Exam  Constitutional:       General: She is not in acute distress  Appearance: She is obese  She is not ill-appearing, toxic-appearing or diaphoretic  HENT:      Head: Normocephalic and atraumatic  Right Ear: External ear normal       Left Ear: External ear normal       Nose: Nose normal       Mouth/Throat:      Pharynx: Oropharynx is clear  Eyes:      General: No scleral icterus  Conjunctiva/sclera: Conjunctivae normal    Cardiovascular:      Rate and Rhythm: Normal rate and regular rhythm  Pulses: Normal pulses  Heart sounds: No murmur heard  No friction rub  No gallop  Pulmonary:      Effort: Pulmonary effort is normal  No respiratory distress  Breath sounds: No wheezing, rhonchi or rales  Abdominal:      General: Bowel sounds are normal  There is no distension  Palpations: Abdomen is soft  Tenderness: There is no abdominal tenderness  There is no right CVA tenderness or left CVA tenderness  Genitourinary:     Comments: Urethral Davis catheter in place draining punch colored urine  Also mildly orange tinged due to Pyridium  Musculoskeletal:         General: Normal range of motion  Cervical back: Normal range of motion  Skin:     General: Skin is warm and dry  Neurological:      General: No focal deficit present  Mental Status: She is alert and oriented to person, place, and time     Psychiatric:         Mood and Affect: Mood normal  Behavior: Behavior normal          Thought Content: Thought content normal          Judgment: Judgment normal        Lab, Imaging and other studies:I have personally reviewed pertinent lab results  Lab Results   Component Value Date    WBC 5 60 06/24/2021    HGB 7 9 (L) 06/24/2021    HCT 24 4 (L) 06/24/2021     (H) 06/24/2021     06/24/2021     Lab Results   Component Value Date    SODIUM 143 06/24/2021    K 3 8 06/24/2021     06/24/2021    CO2 31 06/24/2021    BUN 15 06/24/2021    CREATININE 1 15 06/24/2021    GLUC 119 06/24/2021    CALCIUM 8 8 06/24/2021        VTE Pharmacologic Prophylaxis:  Being held due to recent surgery for bladder tumor  Can be re-initiated after a 24 hours of clear yellow urine    VTE Mechanical Prophylaxis: sequential compression device      Duana Boxer, PA-C

## 2021-06-24 NOTE — ASSESSMENT & PLAN NOTE
· Due to hematuria  · Drop in Hb from 12 5 on 06/18 to 8   · Eliquis held  · Monitor H/H  · Transfuse as needed

## 2021-06-24 NOTE — DISCHARGE INSTRUCTIONS
Transurethral Resection of Bladder Tumors   WHAT YOU NEED TO KNOW:   Transurethral resection of bladder tumors (TURBT) is surgery to remove one or more tumors from your bladder  DISCHARGE INSTRUCTIONS:   Medicines:   · Medicines  help decrease pain or prevent vomiting  · Take your medicine as directed  Contact your healthcare provider if you think your medicine is not helping or if you have side effects  Tell him or her if you are allergic to any medicine  Keep a list of the medicines, vitamins, and herbs you take  Include the amounts, and when and why you take them  Bring the list or the pill bottles to follow-up visits  Carry your medicine list with you in case of an emergency  Follow up with your healthcare provider as directed:  Write down your questions so you remember to ask them during your visits  Care for your Davis catheter:  Keep the bag below your waist  This will prevent urine from flowing back into your bladder and causing an infection or other problems  Also, keep the tube free of kinks so the urine will drain properly  Do not pull on the catheter  This can cause pain and bleeding and may cause the catheter to come out  Empty your urine drainage bag when it is ½ to ? full, or every 8 hours  If you have a smaller leg bag, empty it every 3 to 4 hours  Do the following when you empty your urine drainage bag:  · Hold the urine bag over the toilet or a large container  · Remove the drain spout from its sleeve at the bottom of the urine bag  Do not touch the tip of the drain spout  Open the slide valve on the spout  · Let the urine flow out of the urine bag into the toilet or container  Do not let the drainage tube touch anything  · Clean the end of the drain spout with alcohol when the bag is empty  Ask which cleaning solution is best to use  · Close the slide valve and put the drain spout into its sleeve at the bottom of the urine bag   Write down how much urine was in your bag if you were asked to keep a record  Contact your healthcare provider if:   · You have a fever or chills  · You have new or more blood in your urine  · You have nausea or are vomiting  · You have new or more pain when you urinate  · You are unable to control when you urinate  · You have questions or concerns about your condition or care  Seek care immediately or call 911 if:   · You have heavy bleeding from your urethra  · You start to urinate less often, very little, or not at all  · You have severe pain in your abdomen or pelvis  © Copyright [employer] 2018 Information is for End User's use only and may not be sold, redistributed or otherwise used for commercial purposes  All illustrations and images included in CareNotes® are the copyrighted property of A D A M [employer] Inc  or Brittaney Mcintyre  The above information is an  only  It is not intended as medical advice for individual conditions or treatments  Talk to your doctor, nurse or pharmacist before following any medical regimen to see if it is safe and effective for you

## 2021-06-24 NOTE — ASSESSMENT & PLAN NOTE
Wt Readings from Last 3 Encounters:   06/22/21 76 kg (167 lb 9 6 oz)   06/18/21 81 5 kg (179 lb 10 8 oz)   06/01/21 78 4 kg (172 lb 13 5 oz)     Euvolemic at present  Lasix held due to hypernatremia

## 2021-06-25 ENCOUNTER — TELEPHONE (OUTPATIENT)
Dept: NEPHROLOGY | Facility: CLINIC | Age: 76
End: 2021-06-25

## 2021-06-25 VITALS
WEIGHT: 167.55 LBS | TEMPERATURE: 97.9 F | OXYGEN SATURATION: 96 % | SYSTOLIC BLOOD PRESSURE: 120 MMHG | HEIGHT: 62 IN | HEART RATE: 65 BPM | DIASTOLIC BLOOD PRESSURE: 65 MMHG | RESPIRATION RATE: 18 BRPM | BODY MASS INDEX: 30.83 KG/M2

## 2021-06-25 DIAGNOSIS — N18.30 STAGE 3 CHRONIC KIDNEY DISEASE, UNSPECIFIED WHETHER STAGE 3A OR 3B CKD (HCC): Primary | ICD-10-CM

## 2021-06-25 PROBLEM — C68.9 UROTHELIAL CARCINOMA (HCC): Status: ACTIVE | Noted: 2021-06-25

## 2021-06-25 LAB
ERYTHROCYTE [DISTWIDTH] IN BLOOD BY AUTOMATED COUNT: 14.2 % (ref 11.6–15.1)
HCT VFR BLD AUTO: 26 % (ref 34.8–46.1)
HGB BLD-MCNC: 8.3 G/DL (ref 11.5–15.4)
MCH RBC QN AUTO: 34.9 PG (ref 26.8–34.3)
MCHC RBC AUTO-ENTMCNC: 31.9 G/DL (ref 31.4–37.4)
MCV RBC AUTO: 109 FL (ref 82–98)
PLATELET # BLD AUTO: 135 THOUSANDS/UL (ref 149–390)
PMV BLD AUTO: 12.2 FL (ref 8.9–12.7)
RBC # BLD AUTO: 2.38 MILLION/UL (ref 3.81–5.12)
WBC # BLD AUTO: 7.25 THOUSAND/UL (ref 4.31–10.16)

## 2021-06-25 PROCEDURE — 99239 HOSP IP/OBS DSCHRG MGMT >30: CPT | Performed by: INTERNAL MEDICINE

## 2021-06-25 PROCEDURE — 99223 1ST HOSP IP/OBS HIGH 75: CPT | Performed by: INTERNAL MEDICINE

## 2021-06-25 PROCEDURE — 85027 COMPLETE CBC AUTOMATED: CPT | Performed by: INTERNAL MEDICINE

## 2021-06-25 RX ORDER — OLANZAPINE 10 MG/1
2.5 INJECTION, POWDER, LYOPHILIZED, FOR SOLUTION INTRAMUSCULAR ONCE
Status: DISCONTINUED | OUTPATIENT
Start: 2021-06-25 | End: 2021-06-25 | Stop reason: HOSPADM

## 2021-06-25 RX ORDER — LORAZEPAM 0.5 MG/1
0.5 TABLET ORAL
Qty: 3 TABLET | Refills: 0 | Status: SHIPPED | OUTPATIENT
Start: 2021-06-25

## 2021-06-25 RX ORDER — LEVOTHYROXINE SODIUM 0.1 MG/1
100 TABLET ORAL DAILY
Refills: 0 | Status: SHIPPED
Start: 2021-06-25

## 2021-06-25 RX ADMIN — PHENAZOPYRIDINE 100 MG: 100 TABLET ORAL at 12:22

## 2021-06-25 RX ADMIN — BUPROPION HYDROCHLORIDE 150 MG: 150 TABLET, FILM COATED, EXTENDED RELEASE ORAL at 08:42

## 2021-06-25 RX ADMIN — FAMOTIDINE 20 MG: 20 TABLET ORAL at 08:42

## 2021-06-25 RX ADMIN — HYDROMORPHONE HYDROCHLORIDE 2 MG: 2 TABLET ORAL at 04:27

## 2021-06-25 RX ADMIN — PHENAZOPYRIDINE 100 MG: 100 TABLET ORAL at 08:42

## 2021-06-25 RX ADMIN — SENNOSIDES 8.6 MG: 8.6 TABLET, FILM COATED ORAL at 08:41

## 2021-06-25 RX ADMIN — LEVOTHYROXINE SODIUM 112 MCG: 112 TABLET ORAL at 04:28

## 2021-06-25 RX ADMIN — METOPROLOL TARTRATE 100 MG: 50 TABLET, FILM COATED ORAL at 08:42

## 2021-06-25 RX ADMIN — LORAZEPAM 0.5 MG: 0.5 TABLET ORAL at 04:27

## 2021-06-25 RX ADMIN — DIVALPROEX SODIUM 250 MG: 250 TABLET, DELAYED RELEASE ORAL at 08:43

## 2021-06-25 RX ADMIN — FENOFIBRATE 145 MG: 145 TABLET, FILM COATED ORAL at 08:42

## 2021-06-25 RX ADMIN — FERROUS SULFATE TAB 325 MG (65 MG ELEMENTAL FE) 325 MG: 325 (65 FE) TAB at 08:41

## 2021-06-25 RX ADMIN — POTASSIUM CHLORIDE 20 MEQ: 1500 TABLET, EXTENDED RELEASE ORAL at 08:41

## 2021-06-25 RX ADMIN — CARBAMAZEPINE 100 MG: 100 TABLET, CHEWABLE ORAL at 08:41

## 2021-06-25 RX ADMIN — Medication 2000 UNITS: at 08:41

## 2021-06-25 RX ADMIN — DIGOXIN 125 MCG: 125 TABLET ORAL at 08:42

## 2021-06-25 NOTE — DISCHARGE SUMMARY
Discharging Physician / Practitioner: Mahnaz Nelson MD  PCP: Garth Barrow DO  Admission Date:   Admission Orders (From admission, onward)     Ordered        06/20/21 0742  Inpatient Admission  Once         06/18/21 2344  Place in Observation  Once                   Discharge Date: 06/25/21     Principal discharge diagnoses:  1  High grade urothelial carcinoma  2  Gross hematuria - resolved  3  Hypernatremia - resolved  4  Acute blood loss anemia    Secondary diagnoses:  1  COPD  2  Coronary artery disease  3  Chronic diastolic heart failure  4  Paroxysmal atrial fibrillation  5  Dementia    Consultations During Hospital Stay:  Urology  Nephrology  Endocrinology  Geriatrics    Procedures Performed:   1  CT abdomen and pelvis without contrast (6/18/21) - High density components within the bladder lumen in keeping with intraluminal bladder hemorrhage  Mild circumferential bladder wall thickening and surrounding fat stranding could indicate cystitis or could be reactive to the hemorrhage  2  Chest x-ray - No acute cardiopulmonary disease  3  CT chest,abdomen, pelvis with contrast(6/23/21) - No definitive evidence for metastatic disease  Numerous small pulmonary nodules appear stable, likely benign, possibly sequela of previous granulomatous disease  Limited evaluation of the urinary bladder, decompressed by Davis catheter with nonspecific perivesical stranding and fluid  No dominant bladder mass seen within limitations  Mild circumferential rectal wall thickening could be accentuated by underdistention  Cholelithiasis  4  Cystoscopy with clot evacuation and biopsy and fulguration of bleeding points  5  Transurethral resection of bladder tumor on 6/23/21     Test Results Pending at Discharge (will require follow up): Pathology from resection of bladder tumor     Outpatient Tests Requested:  CBCD, BMP on 6/28/21    Hospital Course:    Lucy Genao is a 77 y o  female patient with a past medical history as detailed above who originally presented to the hospital on 6/18/2021 as a transfer from [facility] for urologic evaluation for gross hematuria in her chronic Davis  On 06/19/21 she underwent cystoscopy which showed a 2 cm nodular mass in the anterolateral wall from which a biopsy was taken  She also underwent evacuation of clots and fulguration of bleeding points  She was on CBI which was discontinued after hematuria resolved  Pathology showed high-grade urothelial carcinoma  She underwent transurethral resection of her bladder tumor on 06/23/21  Pathology results from resection of pending  She had hypernatremia from hypovolemia which resolved with hypotonic fluids  She had blood loss anemia with drop in hemoglobin from 12 5 to 8  Hemoglobin was stable at the time of discharge  She had been on hospice till 7 months ago  She was currently not on Eliquis per her facility  Her urologist will let her family know if Eliquis can be taken safely from the urology standpoint at her office visit  Aspirin was held due to hematuria and can be restarted when cleared by urology  Condition at Discharge: stable    Discharge Day Visit / Exam:   * Please refer to separate progress note for these details *    Discussion with Family: Updated  (daughter) via phone  Discharge instructions/Information to patient and family:   See after visit summary for information provided to patient and family  Provisions for Follow-Up Care:  See after visit summary for information related to follow-up care and any pertinent home health orders  Disposition:   Other [place] at [facility] Readmission: No     Discharge Statement:  I spent 40 minutes discharging the patient  This time was spent on the day of discharge  I had direct contact with the patient on the day of discharge   Greater than 50% of the total time was spent examining patient, answering all patient questions, arranging and discussing plan of care with patient as well as directly providing post-discharge instructions  Additional time then spent on discharge activities  Discharge Medications:  See after visit summary for reconciled discharge medications provided to patient and/or family        **Please Note: This note may have been constructed using a voice recognition system**

## 2021-06-25 NOTE — CASE MANAGEMENT
CM was notified that pt was combative during the night and had to be put in restraints  However, this morning the pt was no longer in restraints and was being calm and cooperative  CM notified Praveen Greenwood at [facility] and inquired if they would still be able to accept the pt  Per Praveen Greenwood she had to check with her  and DON to confirm  Maryla Carson called [facility] notified CM that they would be able to accept the pt today  CM notified pt's attending and nurse of d/c  CM will follow

## 2021-06-25 NOTE — TELEPHONE ENCOUNTER
I have ordered CKD labs in [facility]    Can you please make sure patient goes to [facility] to get these done before upcoming visit with me     Judy Lundberg

## 2021-06-25 NOTE — PLAN OF CARE
Problem: MOBILITY - ADULT  Goal: Maintain or return to baseline ADL function  Description: INTERVENTIONS:  -  Assess patient's ability to carry out ADLs; assess patient's baseline for ADL function and identify physical deficits which impact ability to perform ADLs (bathing, care of mouth/teeth, toileting, grooming, dressing, etc )  - Assess/evaluate cause of self-care deficits   - Assess range of motion  - Assess patient's mobility; develop plan if impaired  - Assess patient's need for assistive devices and provide as appropriate  - Encourage maximum independence but intervene and supervise when necessary  - Involve family in performance of ADLs  - Assess for home care needs following discharge   - Consider OT consult to assist with ADL evaluation and planning for discharge  - Provide patient education as appropriate  Outcome: Progressing  Goal: Maintains/Returns to pre admission functional level  Description: INTERVENTIONS:  - Perform BMAT or MOVE assessment daily    - Set and communicate daily mobility goal to care team and patient/family/caregiver  - Collaborate with rehabilitation services on mobility goals if consulted  - Perform Range of Motion 4 times a day  - Reposition patient every 4 hours    - Dangle patient 2 times a day  - Stand patient 2 times a day  - Ambulate patient 2 times a day  - Out of bed to chair 2 times a day   - Out of bed for meals 3 times a day  - Out of bed for toileting  - Record patient progress and toleration of activity level   Outcome: Progressing     Problem: Potential for Falls  Goal: Patient will remain free of falls  Description: INTERVENTIONS:  - Educate patient/family on patient safety including physical limitations  - Instruct patient to call for assistance with activity   - Consult OT/PT to assist with strengthening/mobility   - Keep Call bell within reach  - Keep bed low and locked with side rails adjusted as appropriate  - Keep care items and personal belongings within reach  - Initiate and maintain comfort rounds  - Make Fall Risk Sign visible to staff  - Offer Toileting every 2 Hours, in advance of need  - Initiate/Maintain alarm  - Obtain necessary fall risk management equipment: Alarms  - Apply yellow socks and bracelet for high fall risk patients  - Consider moving patient to room near nurses station  Outcome: Progressing     Problem: Prexisting or High Potential for Compromised Skin Integrity  Goal: Skin integrity is maintained or improved  Description: INTERVENTIONS:  - Identify patients at risk for skin breakdown  - Assess and monitor skin integrity  - Assess and monitor nutrition and hydration status  - Monitor labs   - Assess for incontinence   - Turn and reposition patient  - Assist with mobility/ambulation  - Relieve pressure over bony prominences  - Avoid friction and shearing  - Provide appropriate hygiene as needed including keeping skin clean and dry  - Evaluate need for skin moisturizer/barrier cream  - Collaborate with interdisciplinary team   - Patient/family teaching  - Consider wound care consult   Outcome: Progressing     Problem: PAIN - ADULT  Goal: Verbalizes/displays adequate comfort level or baseline comfort level  Description: Interventions:  - Encourage patient to monitor pain and request assistance  - Assess pain using appropriate pain scale  - Administer analgesics based on type and severity of pain and evaluate response  - Implement non-pharmacological measures as appropriate and evaluate response  - Consider cultural and social influences on pain and pain management  - Notify physician/advanced practitioner if interventions unsuccessful or patient reports new pain  Outcome: Progressing     Problem: INFECTION - ADULT  Goal: Absence or prevention of progression during hospitalization  Description: INTERVENTIONS:  - Assess and monitor for signs and symptoms of infection  - Monitor lab/diagnostic results  - Monitor all insertion sites, i e  indwelling lines, tubes, and drains  - Monitor endotracheal if appropriate and nasal secretions for changes in amount and color  - [place] appropriate cooling/warming therapies per order  - Administer medications as ordered  - Instruct and encourage patient and family to use good hand hygiene technique  - Identify and instruct in appropriate isolation precautions for identified infection/condition  Outcome: Progressing  Goal: Absence of fever/infection during neutropenic period  Description: INTERVENTIONS:  - Monitor WBC    Outcome: Progressing     Problem: SAFETY ADULT  Goal: Patient will remain free of falls  Description: INTERVENTIONS:  - Educate patient/family on patient safety including physical limitations  - Instruct patient to call for assistance with activity   - Consult OT/PT to assist with strengthening/mobility   - Keep Call bell within reach  - Keep bed low and locked with side rails adjusted as appropriate  - Keep care items and personal belongings within reach  - Initiate and maintain comfort rounds  - Make Fall Risk Sign visible to staff  - Offer Toileting every 2 Hours, in advance of need  - Initiate/Maintain alarm  - Obtain necessary fall risk management equipment:   - Apply yellow socks and bracelet for high fall risk patients  - Consider moving patient to room near nurses station  Outcome: Progressing     Problem: DISCHARGE PLANNING  Goal: Discharge to home or other facility with appropriate resources  Description: INTERVENTIONS:  - Identify barriers to discharge w/patient and caregiver  - Arrange for needed discharge resources and transportation as appropriate  - Identify discharge learning needs (meds, wound care, etc )  - Arrange for interpretive services to assist at discharge as needed  - Refer to Case Management Department for coordinating discharge planning if the patient needs post-hospital services based on physician/advanced practitioner order or complex needs related to functional status, cognitive ability, or social support system  Outcome: Progressing     Problem: Knowledge Deficit  Goal: Patient/family/caregiver demonstrates understanding of disease process, treatment plan, medications, and discharge instructions  Description: Complete learning assessment and assess knowledge base    Interventions:  - Provide teaching at level of understanding  - Provide teaching via preferred learning methods  Outcome: Progressing     Problem: GENITOURINARY - ADULT  Goal: Urinary catheter remains patent  Description: INTERVENTIONS:  - Assess patency of urinary catheter  - If patient has a chronic baca, consider changing catheter if non-functioning  - Follow guidelines for intermittent irrigation of non-functioning urinary catheter  Outcome: Progressing     Problem: METABOLIC, FLUID AND ELECTROLYTES - ADULT  Goal: Electrolytes maintained within normal limits  Description: INTERVENTIONS:  - Monitor labs and assess patient for signs and symptoms of electrolyte imbalances  - Administer electrolyte replacement as ordered  - Monitor response to electrolyte replacements, including repeat lab results as appropriate  - Instruct patient on fluid and nutrition as appropriate  Outcome: Progressing  Goal: Fluid balance maintained  Description: INTERVENTIONS:  - Monitor labs   - Monitor I/O and WT  - Instruct patient on fluid and nutrition as appropriate  - Assess for signs & symptoms of volume excess or deficit  Outcome: Progressing     Problem: SKIN/TISSUE INTEGRITY - ADULT  Goal: Skin Integrity remains intact(Skin Breakdown Prevention)  Description: Assess:  -Perform Surinder assessment every 4  -Clean and moisturize skin every 4  -Inspect skin when repositioning, toileting, and assisting with ADLS  -Assess under medical devices such as walker every 8  -Assess extremities for adequate circulation and sensation     Bed Management:  -Have minimal linens on bed & keep smooth, unwrinkled  -Change linens as needed when moist or perspiring  -Avoid sitting or lying in one position for more than 2 hours while in bed  -Keep HOB at 45 degrees     Toileting:  -Offer bedside commode  -Assess for incontinence every 2  -Use incontinent care products after each incontinent episode    Activity:  -Mobilize patient 3 times a day  -Encourage activity and walks on unit  -Encourage or provide ROM exercises   -Turn and reposition patient every 2 Hours  -Use appropriate equipment to lift or move patient in bed  -Instruct/ Assist with weight shifting every 2 when out of bed in chair  -Consider limitation of chair time 2 hour intervals    Skin Care:  -Avoid use of baby powder, tape, friction and shearing, hot water or constrictive clothing  -Relieve pressure over bony prominences using cushion  -Do not massage red bony areas    Next Steps:  -Teach patient strategies to minimize risks such as reposition   -Consider consults to  interdisciplinary teams such as PT  Outcome: Progressing     Problem: HEMATOLOGIC - ADULT  Goal: Maintains hematologic stability  Description: INTERVENTIONS  - Assess for signs and symptoms of bleeding or hemorrhage  - Monitor labs  - Administer supportive blood products/factors as ordered and appropriate  Outcome: Progressing

## 2021-06-25 NOTE — NURSING NOTE
Report called  Scripts in chart and faxed over  No questions or concerns  Davis chronic and facility aware

## 2021-06-25 NOTE — CASE MANAGEMENT
Per Dr Dana Duff pt is medically clear for d/c today to [facility]  Pt will transition via [facility] BLS at 11:30 AM  IMM was signed and sent to medical records  COVID test was requested and came back negative  Pt's attending and nurse were made aware of d/c  CMN was completed  CM will follow

## 2021-06-25 NOTE — TRANSPORTATION MEDICAL NECESSITY
Section I - General Information    Name of Patient: Aracelis Flores                 : 1945    Medicare #: [id number]  Transport Date: 21 (PCS is valid for round trips on this date and for all repetitive trips in the 60-day range as noted below )  Origin: [address] [facility]                                                         Destination: [facility]   Is the pt's stay covered under Medicare Part A (PPS/DRG)   []     Closest appropriate facility? If no, why is transport to more distant facility required? Yes  If hospice pt, is this transport related to pt's terminal illness? NA       Section II - Medical Necessity Questionnaire  Ambulance transportation is medically necessary only if other means of transport are contraindicated or would be potentially harmful to the patient  To meet this requirement, the patient must either be "bed confined" or suffer from a condition such that transport by means other than ambulance is contraindicated by the patient's condition  The following questions must be answered by the medical professional signing below for this form to be valid:    1)  Describe the MEDICAL CONDITION (physical and/or mental) of this patient AT [address] [place] [address] that requires the patient to be transported in an ambulance and why transport by other means is contraindicated by the patient's condition: Gross hematuria, Dementia, Ambulatory dysfunction, MDRO, pt is a fall and safety risk  2) Is the patient "bed confined" as defined below? No  To be "be confined" the patient must satisfy all three of the following conditions: (1) unable to get up from bed without Assistance; AND (2) unable to ambulate; AND (3) unable to sit in a chair or wheelchair  3) Can this patient safely be transported by car or wheelchair van (i e , seated during transport without a medical attendant or monitoring)?    No    4) In addition to completing questions 1-3 above, please check any of the following conditions that apply*:   *Note: supporting documentation for any boxes checked must be maintained in the patient's medical records  If hosp-hosp transfer, describe services needed at 2nd facility not available at 1st facility? Patient is confused  Medical attendant required   Unable to tolerate seated position for time needed to transport       Section III - Signature of Physician or Healthcare Professional  I certify that the above information is true and correct based on my evaluation of this patient, and represent that the patient requires transport by ambulance and that other forms of transport are contraindicated  I understand that this information will be used by the Centers for Medicare and Medicaid Services (CMS) to support the determination of medical necessity for ambulance services, and I represent that I have personal knowledge of the patient's condition at time of transport  []  If this box is checked, I also certify that the patient is physically or mentally incapable of signing the ambulance service's claim and that the institution with which I am affiliated has furnished care, services, or assistance to the patient  My signature below is made on behalf of the patient pursuant to 42 CFR §424 36(b)(4)  In accordance with 42 CFR §424 37, the specific reason(s) that the patient is physically or mentally incapable of signing the claim form is as follows: Renea Gonzalez of Physician* or Healthcare Professional______________________________________________________________  Signature Date 06/25/21 (For scheduled repetitive transports, this form is not valid for transports performed more than 60 days after this date)    Printed Name & Credentials of Physician or Healthcare Professional (MD, DO, RN, etc )________________________________  *Form must be signed by patient's attending physician for scheduled, repetitive transports   For non-repetitive, unscheduled ambulance transports, if unable to obtain the signature of the attending physician, any of the following may sign (choose appropriate option below)  [] Physician Assistant []  Clinical Nurse Specialist []  Registered Nurse  []  Nurse Practitioner  [x] Discharge Planner

## 2021-06-25 NOTE — PLAN OF CARE
Problem: MOBILITY - ADULT  Goal: Maintain or return to baseline ADL function  Description: INTERVENTIONS:  -  Assess patient's ability to carry out ADLs; assess patient's baseline for ADL function and identify physical deficits which impact ability to perform ADLs (bathing, care of mouth/teeth, toileting, grooming, dressing, etc )  - Assess/evaluate cause of self-care deficits   - Assess range of motion  - Assess patient's mobility; develop plan if impaired  - Assess patient's need for assistive devices and provide as appropriate  - Encourage maximum independence but intervene and supervise when necessary  - Involve family in performance of ADLs  - Assess for home care needs following discharge   - Consider OT consult to assist with ADL evaluation and planning for discharge  - Provide patient education as appropriate  Outcome: Completed  Goal: Maintains/Returns to pre admission functional level  Description: INTERVENTIONS:  - Perform BMAT or MOVE assessment daily    - Set and communicate daily mobility goal to care team and patient/family/caregiver  - Collaborate with rehabilitation services on mobility goals if consulted  - Perform Range of Motion 3 times a day  - Reposition patient every 3 hours    - Dangle patient 3 times a day  - Stand patient 3 times a day  - Ambulate patient 3 times a day  - Out of bed to chair 3 times a day   - Out of bed for meals 3 times a day  - Out of bed for toileting  - Record patient progress and toleration of activity level   Outcome: Completed     Problem: Potential for Falls  Goal: Patient will remain free of falls  Description: INTERVENTIONS:  - Educate patient/family on patient safety including physical limitations  - Instruct patient to call for assistance with activity   - Consult OT/PT to assist with strengthening/mobility   - Keep Call bell within reach  - Keep bed low and locked with side rails adjusted as appropriate  - Keep care items and personal belongings within reach  - Initiate and maintain comfort rounds  - Make Fall Risk Sign visible to staff  - Offer Toileting every 4 Hours, in advance of need  - Initiate/Maintain 1alarm  - Obtain necessary fall risk management equipment  - Apply yellow socks and bracelet for high fall risk patients  - Consider moving patient to room near nurses station  Outcome: Completed     Problem: Prexisting or High Potential for Compromised Skin Integrity  Goal: Skin integrity is maintained or improved  Description: INTERVENTIONS:  - Identify patients at risk for skin breakdown  - Assess and monitor skin integrity  - Assess and monitor nutrition and hydration status  - Monitor labs   - Assess for incontinence   - Turn and reposition patient  - Assist with mobility/ambulation  - Relieve pressure over bony prominences  - Avoid friction and shearing  - Provide appropriate hygiene as needed including keeping skin clean and dry  - Evaluate need for skin moisturizer/barrier cream  - Collaborate with interdisciplinary team   - Patient/family teaching  - Consider wound care consult   Outcome: Completed     Problem: PAIN - ADULT  Goal: Verbalizes/displays adequate comfort level or baseline comfort level  Description: Interventions:  - Encourage patient to monitor pain and request assistance  - Assess pain using appropriate pain scale  - Administer analgesics based on type and severity of pain and evaluate response  - Implement non-pharmacological measures as appropriate and evaluate response  - Consider cultural and social influences on pain and pain management  - Notify physician/advanced practitioner if interventions unsuccessful or patient reports new pain  Outcome: Completed     Problem: INFECTION - ADULT  Goal: Absence or prevention of progression during hospitalization  Description: INTERVENTIONS:  - Assess and monitor for signs and symptoms of infection  - Monitor lab/diagnostic results  - Monitor all insertion sites, i e  indwelling lines, tubes, and drains  - Monitor endotracheal if appropriate and nasal secretions for changes in amount and color  - [place] appropriate cooling/warming therapies per order  - Administer medications as ordered  - Instruct and encourage patient and family to use good hand hygiene technique  - Identify and instruct in appropriate isolation precautions for identified infection/condition  Outcome: Completed  Goal: Absence of fever/infection during neutropenic period  Description: INTERVENTIONS:  - Monitor WBC    Outcome: Completed     Problem: SAFETY ADULT  Goal: Patient will remain free of falls  Description: INTERVENTIONS:  - Educate patient/family on patient safety including physical limitations  - Instruct patient to call for assistance with activity   - Consult OT/PT to assist with strengthening/mobility   - Keep Call bell within reach  - Keep bed low and locked with side rails adjusted as appropriate  - Keep care items and personal belongings within reach  - Initiate and maintain comfort rounds  - Make Fall Risk Sign visible to staff  - Offer Toileting every 4 Hours, in advance of need  - Initiate/Maintain 1alarm  - Obtain necessary fall risk management equipment: 1  - Apply yellow socks and bracelet for high fall risk patients  - Consider moving patient to room near nurses station  Outcome: Completed     Problem: DISCHARGE PLANNING  Goal: Discharge to home or other facility with appropriate resources  Description: INTERVENTIONS:  - Identify barriers to discharge w/patient and caregiver  - Arrange for needed discharge resources and transportation as appropriate  - Identify discharge learning needs (meds, wound care, etc )  - Arrange for interpretive services to assist at discharge as needed  - Refer to Case Management Department for coordinating discharge planning if the patient needs post-hospital services based on physician/advanced practitioner order or complex needs related to functional status, cognitive ability, or social support system  Outcome: Completed     Problem: Knowledge Deficit  Goal: Patient/family/caregiver demonstrates understanding of disease process, treatment plan, medications, and discharge instructions  Description: Complete learning assessment and assess knowledge base    Interventions:  - Provide teaching at level of understanding  - Provide teaching via preferred learning methods  Outcome: Completed     Problem: GENITOURINARY - ADULT  Goal: Urinary catheter remains patent  Description: INTERVENTIONS:  - Assess patency of urinary catheter  - If patient has a chronic baca, consider changing catheter if non-functioning  - Follow guidelines for intermittent irrigation of non-functioning urinary catheter  Outcome: Completed     Problem: METABOLIC, FLUID AND ELECTROLYTES - ADULT  Goal: Electrolytes maintained within normal limits  Description: INTERVENTIONS:  - Monitor labs and assess patient for signs and symptoms of electrolyte imbalances  - Administer electrolyte replacement as ordered  - Monitor response to electrolyte replacements, including repeat lab results as appropriate  - Instruct patient on fluid and nutrition as appropriate  Outcome: Completed  Goal: Fluid balance maintained  Description: INTERVENTIONS:  - Monitor labs   - Monitor I/O and WT  - Instruct patient on fluid and nutrition as appropriate  - Assess for signs & symptoms of volume excess or deficit  Outcome: Completed     Problem: SKIN/TISSUE INTEGRITY - ADULT  Goal: Skin Integrity remains intact(Skin Breakdown Prevention)  Description: Assess:  -Perform Surinder assessment every 4  -Clean and moisturize skin every 4  -Inspect skin when repositioning, toileting, and assisting with ADLS    -Assess extremities for adequate circulation and sensation     Bed Management:  -Have minimal linens on bed & keep smooth, unwrinkled  -Change linens as needed when moist or perspiring  -Avoid sitting or lying in one position for more than 3 hours while in bed  -Keep HOB at 45 degrees     Toileting:  -Offer bedside commode  -Assess for incontinence every 4    Activity:  -Mobilize patient 3 times a day  -Encourage activity and walks on unit  -Encourage or provide ROM exercises   -Turn and reposition patient every 4 Hours  -Use appropriate equipment to lift or move patient in bed  -Instruct/ Assist with weight shifting every 4 when out of bed in chair  -Consider limitation of chair time 3 hour intervals    Skin Care:  -Avoid use of baby powder, tape, friction and shearing, hot water or constrictive clothing  -Relieve pressure over bony prominences using allevyn  -Do not massage red bony areas    Next Steps:  -Teach patient strategies to minimize   Outcome: Completed     Problem: HEMATOLOGIC - ADULT  Goal: Maintains hematologic stability  Description: INTERVENTIONS  - Assess for signs and symptoms of bleeding or hemorrhage  - Monitor labs  - Administer supportive blood products/factors as ordered and appropriate  Outcome: Completed

## 2021-06-25 NOTE — TELEPHONE ENCOUNTER
[facility] will [facility]

## 2021-06-25 NOTE — CONSULTS
Consultation - Geriatrics   Linda Eason 77 y o  female MRN: [id number]  Unit/Bed#: [facility] 931-01 Encounter: [id number]      Assessment/Plan  1  Acute metabolic encephalopathy  · Multifactorial:  Recent dehydration/hypernatremia, ABLA, recent sx with general anesthesia, underlying dementia with change in routine/environment  · [place] delirium precautions:  First line treatment reorient, redirect, reassure  Avoid use of restrains and sedation  Avoid overstimulation of pt  Include family as able  · Pt currently pleasant and cooperative - would dc restraints as soon as able  Increase close monitoring/1:1 if needed  · If pt has agitations that are not redirectable and causing harm to pt/staff, recommend zyprexa 2 5mg IM q8h prn (No recent qtc/ekg done - monitor if antipsychotic used)  · Avoid deliriogenic medications such as high-dose narcotics, anticholinergics, benzodiazepines  · Would discontinue haldol, dilaudid prn as these are not on pt's facility med list  · Would not acutely stop ativan as this could precipitate agitations, would cont GDR at facility  · Identify and treat reversible causes confusion such as dehydration, electrolyte imbalance, infection, hypoxia, anemia, pain, urinary retention, constipation  · Consider adding scheduled tylenol for a few days as agitations may be related to pain that pt is not able to verbalize  · Engage in regular activity  Monitor sleep-wake cycle  Monitor depression  · Ensure adequate hydration nutrition  Mobilize early and often according to plan of care  2   Dementia  · Resides at skilled nursing facility  · Cont 24/7 care for assist with adl/iadls  · Continues on depakote, ativan at hs    · Engage in regular activity  Optimize chronic conditions  · Per facility:  Pt not taking levothyroxine at facility could contribute to TSH 36 8  3    Ambulatory dysfunction  · WC for mobility at baseline, 1 for transfers  · Recommend pt/ot eval to assist with mobility/transfers d/t deconditioning related to hospitalization  · Fall precautions  4  Deconditioning/frailty  · Optimize diet, hydration, mobility for healing  · GFR 47: improved - keep hydrated, avoid nephrotoxins, avoid hypotension  · Hbg 7 9 (trending down from admission) - IM/nephro managing  · Albumin 3 1:  House diet  Consider nutrition consult if needed for protein supplement  · Recommend pt/ot eval for recs for therapy needs d/t deconditoning  · Monitor ss infection, dehydration, dvt, skin breakdown  5  Anxiety/depression  · Cont daily wellbutrin xl  Cont ativan @ hs for now, would GDR at facility  · Cont emotional support, relaxation techniques  · Per facility, pt's daughter helps pt relax- would have her visit pt as much as able to help promote relaxation  6  Insomnia  · Cont good sleep hygiene techniques  Remains on melatonin/ativan  Avoid daytime sleep, Avoid hs distractions  7  Constipation  · LBM 6/25  Cont senna  Encourage dietary fiber, fluids, mobility as able  · Chronic constipation at home on senna and miralax  8   Bladder ca  · S/p transurethral resection of bladder 6/23 for urothelial carcinoma of the bladder  · Cont with indwelling baca catheter (neurogenic bladder, chronic)  · Managed by IM/uro  9  Vision impairment  · Supportive care  Ensure use of glasses, proper lighting, ability to read instructions    10   Home medication review  [facility]  wellbutrin SR 150mg qday  Tegretol 100mg tid  Lanoxin 0 125mg daily  depakote 250mg q12h  Lasix 40mg daily  Ativan 0 5mg qhs  Lasix 40mg daily  lopressor 100mg q12h  Melatonin 6mg qhs  Albuterol inhaler prn  zofran prn  hyoscamine prn    Reports the following medications that appear on [facility] home meds list are not currently on facility list:  Eliquis 2 5mg bid  lipitor 10mg daily  pepcid 20mg daily  Iron 325mg bid  advair   Dilaudid 2mg prn  levothyroxine 100mcg daily  Haldol 1mg prn      History of Present Illness   Physician Requesting Consult: Nia Hinkle MD  Reason for Consult / Principal Problem: dementia w/behaviors   Hx and PE limited by: dementia  HPI: Beth Greco is a 77y o  year old female who presents with hematuria  Currently resides at [facility]  Patient had TURB on 6/23 for urothelial carcinoma of bladder  This am she became acutely agitated and required restraints  Geriatrics has been consulted to review  Comorbidities include dementia, mood disorder, ambulatory dysfunction  Patient is currently pleasant and cooperative  Per nursing, she has not exhibited behaviors other than this am   However, she did refused medications the other day  Patient states she is forgetful, not sure where she lives  She is oriented to person, aware she is in hospital (not sure why)  She states normally she does well, has good appetite  She wears glasses and dentures, no hearing aides  She tells me she uses walker, no falls  No issues with sleep  No anxiety  + depression  She states no chronic pain  No weight change  She states she does get frustrated from time to time d/t memory loss  Reached out to Danie Anderson from [facility]  Pt is recently at [employer] s/p covid  Prior to that she was on hospice  Pt is mobility by wheelchair, 1 assist for transfers  She has a good diet when daughter is able to come and visit her  She is normally pleasant and cooperative, there are no concerns for behaviors  Per ari, ativan has been weaned down slowly, pt likely does not need long term  Consults    Review of Systems   Constitutional: Negative for activity change, appetite change, chills and fatigue  HENT: Negative for congestion, hearing loss and trouble swallowing  Eyes: Positive for visual disturbance (chronic, glasses)  Respiratory: Negative for cough and shortness of breath  Cardiovascular: Negative for chest pain     Gastrointestinal: Negative for abdominal pain, constipation, diarrhea, nausea and vomiting  Genitourinary: Positive for difficulty urinating (indwelling cath)  Musculoskeletal: Positive for gait problem  Negative for arthralgias and back pain  Neurological: Negative for dizziness and light-headedness  Psychiatric/Behavioral: Positive for decreased concentration (forgetful) and dysphoric mood (sometimes)  Negative for sleep disturbance  The patient is not nervous/anxious  Historical Information   Past Medical History:   Diagnosis Date    Atrial fibrillation ([address] )     Brain aneurysm     CAD (coronary artery disease)     Cardiac disease     had stents 12 years ago in [facility]    CKD (chronic kidney disease) stage 3, GFR 30-59 ml/min ([facility]) ckd    COPD (chronic obstructive pulmonary disease) ([facility])     Dementia ([facility])     Diastolic CHF, chronic ([facility])     GERD (gastroesophageal reflux disease)     Hyperlipidemia     Hyperlipidemia     Hypertension     Hypothyroidism     Migraine     PAD (peripheral artery disease) ([facility])     Renal disorder     Seizures ([address] )     Stroke [facility])      Past Surgical History:   Procedure Laterality Date    APPENDECTOMY      ARTERIOGRAM N/A 12/7/2018    Procedure: ARTERIOGRAM WITH STENT PLACEMENT;  Surgeon: Carlyn Reddy MD;  [facility];  Service: Vascular    BLADDER TUMOR EXCISION     [id number]  clip right frontal lobe    CARDIAC SURGERY      COLONOSCOPY      CORONARY STENT PLACEMENT      5 stents    EYE SURGERY      IR AORTAGRAM WITH RUN-OFF  12/7/2018    MANDIBLE FRACTURE SURGERY      MN CYSTOURETHROSCOPY W/IRRIG & EVAC CLOTS N/A 6/19/2021    Procedure: CYSTOSCOPY EVACUATION OF CLOTS;  Surgeon: Ioana Baig MD;  [facility];  Service: Urology    TONSILLECTOMY      TRANSURETHRAL RESECTION OF BLADDER TUMOR N/A 6/23/2021    Procedure: TRANSURETHRAL RESECTION OF BLADDER TUMOR (TURBT);   Surgeon: Ioana Baig MD;  [facility];  Service: Urology    TUBAL LIGATION      UTERINE FIBROID SURGERY       Social History   Social History     Substance and Sexual Activity   Alcohol Use Not Currently    Alcohol/week: 0 0 standard drinks     Social History     Substance and Sexual Activity   Drug Use No     Social History     Tobacco Use   Smoking Status Former Smoker    Packs/day: 5 00    Years: 40 00    Pack years: 200 00    Quit date: 2007    Years since quittin 8   Smokeless Tobacco Never Used         Family History:   Family History   Problem Relation Age of Onset    Heart disease Father     Parkinsonism Father     Macular degeneration Mother     Glaucoma Mother     Diabetes Brother     Heart disease Brother        Meds/Allergies   Current meds:   Current Facility-Administered Medications   Medication Dose Route Frequency    acetaminophen (TYLENOL) tablet 650 mg  650 mg Oral Q8H PRN    albuterol (PROVENTIL HFA,VENTOLIN HFA) inhaler 2 puff  2 puff Inhalation Q6H PRN    atorvastatin (LIPITOR) tablet 10 mg  10 mg Oral After Dinner    belladonna-opium (B&O SUPPOSITORY) 16 2-30 mg suppository 1 suppository  30 mg Rectal Q8H PRN    bisacodyl (DULCOLAX) rectal suppository 10 mg  10 mg Rectal Daily PRN    buPROPion (WELLBUTRIN XL) 24 hr tablet 150 mg  150 mg Oral Daily    carBAMazepine (TEGretol) chewable tablet 100 mg  100 mg Oral TID    cholecalciferol (VITAMIN D3) tablet 2,000 Units  2,000 Units Oral Daily    dextrose 5 % infusion  60 mL/hr Intravenous Continuous    digoxin (LANOXIN) tablet 125 mcg  125 mcg Oral Daily    divalproex sodium (DEPAKOTE) EC tablet 250 mg  250 mg Oral Q12H TONI    famotidine (PEPCID) tablet 20 mg  20 mg Oral Daily    fenofibrate (TRICOR) tablet 145 mg  145 mg Oral Daily    ferrous sulfate tablet 325 mg  325 mg Oral BID    haloperidol (HALDOL) tablet 1 mg  1 mg Oral Q8H PRN    HYDROmorphone (DILAUDID) tablet 2 mg  2 mg Oral Q4H PRN    hyoscyamine (LEVSIN/SL) SL tablet 0 125 mg 0 125 mg Sublingual Q4H PRN    levothyroxine tablet 112 mcg  112 mcg Oral Early Morning    LORazepam (ATIVAN) tablet 0 5 mg  0 5 mg Oral HS    metoprolol tartrate (LOPRESSOR) tablet 100 mg  100 mg Oral Q12H [address]    nitroglycerin (NITROSTAT) SL tablet 0 4 mg  0 4 mg Sublingual Q5 Min PRN    OLANZapine (ZyPREXA) IM injection 2 5 mg  2 5 mg Intramuscular Once    ondansetron (ZOFRAN) injection 4 mg  4 mg Intravenous Q6H PRN    phenazopyridine (PYRIDIUM) tablet 100 mg  100 mg Oral TID With Meals    polyethylene glycol (MIRALAX) packet 17 g  17 g Oral Daily PRN    potassium chloride (K-DUR,KLOR-CON) CR tablet 20 mEq  20 mEq Oral Daily    senna (SENOKOT) tablet 8 6 mg  1 tablet Oral Daily      Current PTA meds:  Medications Prior to Admission   Medication    Acetaminophen 325 MG CAPS    albuterol (PROVENTIL HFA,VENTOLIN HFA) 90 mcg/act inhaler    allopurinol (ZYLOPRIM) 300 mg tablet    apixaban (Eliquis) 2 5 mg    aspirin 81 mg chewable tablet    atorvastatin (LIPITOR) 10 mg tablet    betamethasone dipropionate (DIPROSONE) 0 05 % cream    bisacodyl (DULCOLAX) 10 mg suppository    buPROPion (WELLBUTRIN SR) 150 mg 12 hr tablet    carBAMazepine (TEGretol) 100 mg chewable tablet    Cholecalciferol (VITAMIN D) 2000 units CAPS    Citric So-Hkhevhuayuw-Qi Carb (RENACIDIN) SOLN    clopidogrel (PLAVIX) 75 mg tablet    digoxin (LANOXIN) 0 125 mg tablet    divalproex sodium (DEPAKOTE) 250 mg EC tablet    famotidine (PEPCID) 20 mg tablet    fenofibrate micronized (LOFIBRA) 67 MG capsule    ferrous sulfate 325 (65 Fe) mg tablet    fluticasone-salmeterol (ADVAIR, WIXELA) 250-50 mcg/dose inhaler    furosemide (LASIX) 20 mg tablet    haloperidol (HALDOL) 1 mg tablet    Hydromorphone HCl 1 MG/ML LIQD    Hyoscyamine Sulfate (HYOSCYAMINE PO)    ipratropium-albuterol (DUO-NEB) 0 5-2 5 mg/3 mL nebulizer solution    lactobacillus acidophilus    levothyroxine 100 mcg tablet    LORazepam (ATIVAN) 0 5 mg tablet  melatonin 3 mg    metoprolol tartrate (LOPRESSOR) 100 mg tablet    nitroglycerin (NITROSTAT) 0 4 mg SL tablet    nystatin (MYCOSTATIN) powder    polyethylene glycol (MIRALAX) 17 g packet    potassium chloride (K-DUR,KLOR-CON) 20 mEq tablet    prochlorperazine (COMPAZINE) 10 mg tablet    senna (SENOKOT) 8 6 MG tablet        Allergies   Allergen Reactions    Dye [Iodinated Diagnostic Agents] Other (See Comments)     allergy    Spiriva Handihaler [Tiotropium Bromide Monohydrate] Swelling    Ramipril Facial Swelling    Penicillins      unsure       Objective   Vitals: Blood pressure 120/65, pulse 65, temperature 97 9 °F (36 6 °C), resp  rate 18, height 5' 2" (1 575 m), weight 76 kg (167 lb 8 8 oz), SpO2 96 %  ,Body mass index is 30 65 kg/m²  Physical Exam  Vitals and nursing note reviewed  Constitutional:       General: She is not in acute distress  Appearance: Normal appearance  She is well-developed  She is not diaphoretic  HENT:      Head: Normocephalic  Cardiovascular:      Rate and Rhythm: Normal rate  Heart sounds: No friction rub  No gallop  Pulmonary:      Effort: Pulmonary effort is normal  No respiratory distress  Breath sounds: Normal breath sounds  No wheezing or rales  Abdominal:      General: Bowel sounds are normal  There is no distension  Palpations: Abdomen is soft  Tenderness: There is no abdominal tenderness  There is no rebound  Musculoskeletal:         General: Normal range of motion  Skin:     General: Skin is warm and dry  Neurological:      General: No focal deficit present  Mental Status: She is alert  Mental status is at baseline  Comments: Oriented to self, birthdate  Aware she is in hospital, doesn't know why or which one    Doesn't know month/season  Cooperative, follows commands, engages in convo   Psychiatric:         Mood and Affect: Mood normal          Behavior: Behavior normal          Lab Results:   Results from last 7 days   Lab Units 06/24/21  0601   WBC Thousand/uL 5 60   HEMOGLOBIN g/dL 7 9*   HEMATOCRIT % 24 4*   PLATELETS Thousands/uL 150        Results from last 7 days   Lab Units 06/24/21  0630 06/19/21  0738   POTASSIUM mmol/L 3 8 3 9   CHLORIDE mmol/L 108 113*   CO2 mmol/L 31 33*   BUN mg/dL 15 37*   CREATININE mg/dL 1 15 1 56*   CALCIUM mg/dL 8 8 9 4   ALK PHOS U/L  --  77   ALT U/L  --  21   AST U/L  --  14       Imaging Studies: I have personally reviewed pertinent reports  EKG, Pathology, and Other Studies: I have personally reviewed pertinent reports      VTE Prophylaxis: Sequential compression device (Venodyne)     Code Status: Level 3 - DNAR and DNI

## 2021-06-25 NOTE — TELEPHONE ENCOUNTER
Spoke with Patient's daughter Dania Bruner to set up Hospital follow up as per Dr Judith Townsend  Dania Bruner informed me that the patient is now at [facility] in [facility]  She stated that [facility] has an appt on 7/6 with the Urologist so we made her appt  for the same day at 11 am with Dr Qi Tafoya  Explained to her that they will need to get labs done before this visit  She stated that she will find out what the procedure is with [facility] and if she needs, she will  the orders before then  New lab orders may need to be added  IMPORTANT:  Dania Bruner (who is Debi's medical POA) stated that due to the degree of her mom's  Alzheimer's - she does NOT want her mom to know that she may have bladder cancer

## 2021-06-25 NOTE — NURSING NOTE
Patient attempted to get out of bed  Subsequently became combative with nurses when nurses attempted to get patient back into bed and clean the patient because the patient had a bowel movement  Control team called  Patient was then able to be cleaned  Patient was compliant for approximately an hour then became combative once again  Patient told nurses to "fuck off" and "die " Patient attempted to scratch and bite multiple nurses and PCAs  Patient kicked nurse

## 2021-06-25 NOTE — ASSESSMENT & PLAN NOTE
· Eliquis held due to hematuria with subsequent removal of bladder mass - continue to hold for now per urology  · Ct BB, digoxin

## 2021-06-25 NOTE — PROGRESS NOTES
[address] [facility]  Progress Note Brendalyn Grams [birth year], 77 y o  female MRN: [id number]  Unit/Bed#: [facility] 931-01 Encounter: [id number]  Primary Care Provider: Thomasine Heimlich, DO   Date and time admitted to hospital: 6/18/2021 11:42 PM    * Gross hematuria  Assessment & Plan  · Presented to [facility] on 6/18 from her facility [facility] with hematuria in her baca  · CT A/P - "High density components within the bladder lumen in keeping with intraluminal bladder hemorrhage  Mild circumferential bladder wall thickening and surrounding fat stranding could indicate cystitis or could be reactive to the hemorrhage "  · On 6/19 underwent cystoscopy which showed a 2 cm nodular mass in the anterolateral wall from which biopsy was taken  Also underwent evacuation if clot and fulguration of bleeding points   Resection not done as she had been on Eliquis  · Was on CBI which was discontinued after hematuria resolved  · Pathology - high grade urothelial carcinoma  · Status post transurethral resection of bladder tumor on 6/23  · Minimal pink tinge of urine       Acute blood loss anemia  Assessment & Plan  · Due to hematuria  · Drop in Hb from 12 5 on 06/18 to 8   · Eliquis held  · Monitor H/H  · Transfuse as needed      Coagulopathy [facility])  Assessment & Plan  Due to Eliquis which has been held     Paroxysmal atrial fibrillation ([facility] [address] )  Assessment & Plan  · Eliquis held due to hematuria with subsequent removal of bladder mass - continue to hold for now per urology  · Ct BB, digoxin      Coronary artery disease involving native coronary artery of native heart without angina pectoris  Assessment & Plan  · ASA held due to hematuria  · Ct BB    Chronic diastolic heart failure ([address] )  Assessment & Plan  Wt Readings from Last 3 Encounters:   06/24/21 76 kg (167 lb 8 8 oz)   06/18/21 81 5 kg (179 lb 10 8 oz)   06/01/21 78 4 kg (172 lb 13 5 oz)     Euvolemic at present  Lasix held due to hypernatremia      COPD (chronic obstructive pulmonary disease) ([facility])  Assessment & Plan  Respiratory protocol    Hypernatremia  Assessment & Plan  Resolved with hypotonic fluids  Lasix held  Monitor           VTE Pharmacologic Prophylaxis: VTE Score: 6 High Risk (Score >/= 5) - Pharmacological DVT Prophylaxis Contraindicated  Sequential Compression Devices Ordered  Patient Centered Rounds: Discussed with RN  Discussions with Specialists or Other Care Team Provider: Discussed with urology    Education and Discussions with Family / Patient: Updated  (daughter) via phone  Time Spent for Care: 20 minutes  More than 50% of total time spent on counseling and coordination of care as described above  Current Length of Stay: 5 day(s)  Current Patient Status: Inpatient   Discharge Plan: Anticipate discharge tomorrow to prior facility    Code Status: Level 3 - DNAR and DNI    Subjective:   No abdominal pain  Minimal pink tinge of urine    Objective:     Vitals:   Temp (24hrs), Av 8 °F (36 6 °C), Min:97 6 °F (36 4 °C), Max:98 1 °F (36 7 °C)    Temp:  [97 6 °F (36 4 °C)-98 1 °F (36 7 °C)] 97 6 °F (36 4 °C)  HR:  [59-65] 59  Resp:  [17-18] 18  BP: (123-154)/(51-66) 123/51  SpO2:  [90 %-96 %] 90 %  Body mass index is 30 65 kg/m²  Physical Exam:   Physical Exam  Vitals reviewed  HENT:      Head: Normocephalic  Nose: Nose normal       Mouth/Throat:      Mouth: Mucous membranes are moist    Eyes:      Extraocular Movements: Extraocular movements intact  Cardiovascular:      Rate and Rhythm: Normal rate and regular rhythm  Pulmonary:      Effort: Pulmonary effort is normal  No respiratory distress  Breath sounds: Normal breath sounds  No wheezing  Abdominal:      General: Bowel sounds are normal  There is no distension  Palpations: Abdomen is soft  Tenderness: There is no abdominal tenderness  Musculoskeletal:         General: No swelling  Cervical back: Neck supple  Skin:     General: Skin is warm and dry  Neurological:      General: No focal deficit present  Mental Status: She is alert  Additional Data:     Labs:  Results from last 7 days   Lab Units 06/24/21  0601 06/22/21  0611   WBC Thousand/uL 5 60 6 04   HEMOGLOBIN g/dL 7 9* 8 0*   HEMATOCRIT % 24 4* 26 0*   PLATELETS Thousands/uL 150 142*   NEUTROS PCT %  --  36*   LYMPHS PCT %  --  45*   MONOS PCT %  --  9   EOS PCT %  --  5     Results from last 7 days   Lab Units 06/24/21  0630 06/19/21  0738   SODIUM mmol/L 143 148*   POTASSIUM mmol/L 3 8 3 9   CHLORIDE mmol/L 108 113*   CO2 mmol/L 31 33*   BUN mg/dL 15 37*   CREATININE mg/dL 1 15 1 56*   ANION GAP mmol/L 4 2*   CALCIUM mg/dL 8 8 9 4   ALBUMIN g/dL  --  3 1*   TOTAL BILIRUBIN mg/dL  --  0 32   ALK PHOS U/L  --  77   ALT U/L  --  21   AST U/L  --  14   GLUCOSE RANDOM mg/dL 119 159*     Results from last 7 days   Lab Units 06/19/21  0738   INR  1 19                   Lines/Drains:  Invasive Devices     Peripheral Intravenous Line            Peripheral IV 06/22/21 Left; Lower Arm 2 days          Drain            Urethral Catheter Double-lumen 22 Fr  1 day              Urinary Catheter:  Goal for removal: N/A - Chronic Davis           Recent Cultures (last 7 days):   Results from last 7 days   Lab Units 06/18/21  1746   URINE CULTURE  <10,000 cfu/ml        Last 24 Hours Medication List:   Current Facility-Administered Medications   Medication Dose Route Frequency Provider Last Rate    acetaminophen  650 mg Oral Q8H PRN Belia Sapp MD      albuterol  2 puff Inhalation Q6H PRN Belia Sapp MD      atorvastatin  10 mg Oral After Maya Officer, MD      belladonna-opium  30 mg Rectal Q8H PRN Belia Sapp MD      bisacodyl  10 mg Rectal Daily PRN Belia Sapp MD      buPROPion  150 mg Oral Daily Belia Sapp MD      carBAMazepine  100 mg Oral TID Belia Sapp MD      cholecalciferol  2,000 Units Oral Daily Justice Braxton, MD      dextrose  60 mL/hr Intravenous Continuous Debrajose Campos MD 60 mL/hr (06/23/21 1250)    digoxin  125 mcg Oral Daily Justice Braxton, MD      divalproex sodium  250 mg Oral Q12H [address] Justice Braxton, MD      famotidine  20 mg Oral Daily Justice Braxton, MD      fenofibrate  145 mg Oral Daily Justice Braxton, MD      ferrous sulfate  325 mg Oral BID Justice Braxton, MD      haloperidol  1 mg Oral Q8H PRN Justice Braxton, MD      HYDROmorphone  2 mg Oral Q4H PRN Justice Braxton, MD      hyoscyamine  0 125 mg Sublingual Q4H PRN Justice Braxton, MD      levothyroxine  112 mcg Oral Early Morning Justice Braxton, MD      LORazepam  0 5 mg Oral HS Justice Braxton, MD      metoprolol tartrate  100 mg Oral Q12H [address] Justice Braxton MD      nitroglycerin  0 4 mg Sublingual Q5 Min PRN Justice Braxton, MD      ondansetron  4 mg Intravenous Q6H PRN Justice Braxton, MD      phenazopyridine  100 mg Oral TID With Meals Justice Braxton MD      polyethylene glycol  17 g Oral Daily PRN Uj[place] Braxton, MD      potassium chloride  20 mEq Oral Daily Justice Braxton, MD      senna  1 tablet Oral Daily Justice Braxton, MD          Today, Patient Was Seen By: Pushpa Amos MD    **Please Note: This note may have been constructed using a voice recognition system  **

## 2021-06-25 NOTE — ASSESSMENT & PLAN NOTE
Wt Readings from Last 3 Encounters:   06/24/21 76 kg (167 lb 8 8 oz)   06/18/21 81 5 kg (179 lb 10 8 oz)   06/01/21 78 4 kg (172 lb 13 5 oz)     Euvolemic at present  Lasix held due to hypernatremia

## 2021-06-25 NOTE — QUICK NOTE
Pt daughter contacted nurse regarding glasses and dentures not arriving at facility with her  Dentures are here and family is aware   services called to transport dentures to facility spoke to Dulce Rausch will be transporting  Pt had been seen here with her purple wired glasses in hand earlier in morning  Pt had glasses on food tray and they were moved off of food tray onto bedside tray table  Pt's daughter was notified she had them prior to discharge in room  Charge nurse is aware  This note was charted at 1800, unable to change time for after discharge time

## 2021-06-25 NOTE — ASSESSMENT & PLAN NOTE
· Presented to [facility] on 6/18 from her facility [facility] with hematuria in her baca  · CT A/P - "High density components within the bladder lumen in keeping with intraluminal bladder hemorrhage  Mild circumferential bladder wall thickening and surrounding fat stranding could indicate cystitis or could be reactive to the hemorrhage "  · On 6/19 underwent cystoscopy which showed a 2 cm nodular mass in the anterolateral wall from which biopsy was taken  Also underwent evacuation if clot and fulguration of bleeding points   Resection not done as she had been on Eliquis  · Was on CBI which was discontinued after hematuria resolved  · Pathology - high grade urothelial carcinoma  · Status post transurethral resection of bladder tumor on 6/23  · Minimal pink tinge of urine

## 2021-06-25 NOTE — NURSING NOTE
Patient was agitated and uncooperative when IV and blood work were attempted  IV and blood work aborted until patient is calm

## 2021-06-26 NOTE — ASSESSMENT & PLAN NOTE
· Presented to [facility] on 6/18 from her facility [facility] with hematuria in her baca  · CT A/P - "High density components within the bladder lumen in keeping with intraluminal bladder hemorrhage  Mild circumferential bladder wall thickening and surrounding fat stranding could indicate cystitis or could be reactive to the hemorrhage "  · On 6/19 underwent cystoscopy which showed a 2 cm nodular mass in the anterolateral wall from which biopsy was taken  Also underwent evacuation if clot and fulguration of bleeding points   Resection not done as she had been on Eliquis  · Was on CBI which was discontinued after hematuria resolved  · Pathology - high grade urothelial carcinoma  · Status post transurethral resection of bladder tumor on 6/23  · Minimal pink tinge of urine

## 2021-06-26 NOTE — PROGRESS NOTES
[address] [facility]  Progress Note Yisel Hamper [birth year], 77 y o  female MRN: [id number]  Unit/Bed#: [facility] 931-01 Encounter: [id number]  Primary Care Provider: Violet Baptiste DO   Date and time admitted to hospital: 6/18/2021 11:42 PM    * Gross hematuria  Assessment & Plan  · Presented to [facility] on 6/18 from her facility [facility] with hematuria in her baca  · CT A/P - "High density components within the bladder lumen in keeping with intraluminal bladder hemorrhage  Mild circumferential bladder wall thickening and surrounding fat stranding could indicate cystitis or could be reactive to the hemorrhage "  · On 6/19 underwent cystoscopy which showed a 2 cm nodular mass in the anterolateral wall from which biopsy was taken  Also underwent evacuation if clot and fulguration of bleeding points   Resection not done as she had been on Eliquis  · Was on CBI which was discontinued after hematuria resolved  · Pathology - high grade urothelial carcinoma  · Status post transurethral resection of bladder tumor on 6/23  · Minimal pink tinge of urine       Acute blood loss anemia  Assessment & Plan  · Due to hematuria  · Drop in Hb from 12 5 on 06/18 to 8 - now stable      Paroxysmal atrial fibrillation ([facility])  Assessment & Plan  · Ct BB, digoxin  · Per discussion with facility was not on Eliquis  · WAs on ASA   · D/w daughter - taken off hospice around 7 months ago - some meds to be restarted  · Per urology continue to hold Eliquis,ASA for now -  Discuss restarting when seen by urology in the office      Coronary artery disease involving native coronary artery of native heart without angina pectoris  Assessment & Plan  · ASA held due to hematuria - restart when cleared by urology  · Ct BB    Chronic diastolic heart failure ([address] )  Assessment & Plan  Wt Readings from Last 3 Encounters:   06/25/21 76 kg (167 lb 8 8 oz)   06/18/21 81 5 kg (179 lb 10 8 oz)   06/01/21 78 4 kg (172 lb 13 5 oz)     Euvolemic at present  Lasix restarted       COPD (chronic obstructive pulmonary disease) ([facility])  Assessment & Plan  Respiratory protocol    Hypernatremia  Assessment & Plan  Resolved with hypotonic fluids      Dementia ([facility])  Assessment & Plan  · Episode of agitation last night but now calm  · Supportive care    Patient Centered Rounds: I performed bedside rounds with nursing staff today  Discussions with Specialists or Other Care Team Provider:  Discussed with  Geriatrics     Education and Discussions with Family / Patient: Updated  (daughter) via phone  Time Spent for Care: 20 minutes  More than 50% of total time spent on counseling and coordination of care as described above  Current Length of Stay: 6 day(s)  Current Patient Status: Inpatient     Discharge Plan:  Discharge to [facility] today    Subjective: Had episode of agitation last night but calm since morning    Objective:     Vitals:   Temp (24hrs), Av 5 °F (36 9 °C), Min:97 9 °F (36 6 °C), Max:99 °F (37 2 °C)    Temp:  [97 9 °F (36 6 °C)-99 °F (37 2 °C)] 97 9 °F (36 6 °C)  HR:  [65-71] 65  Resp:  [18] 18  BP: ()/(49-65) 120/65  SpO2:  [96 %-97 %] 96 %  Body mass index is 30 65 kg/m²  Physical Exam:   Physical Exam  Vitals reviewed  HENT:      Head: Normocephalic  Nose: Nose normal       Mouth/Throat:      Mouth: Mucous membranes are moist    Eyes:      Extraocular Movements: Extraocular movements intact  Cardiovascular:      Rate and Rhythm: Normal rate and regular rhythm  Pulmonary:      Effort: Pulmonary effort is normal  No respiratory distress  Breath sounds: Normal breath sounds  No wheezing  Abdominal:      General: Bowel sounds are normal  There is no distension  Palpations: Abdomen is soft  Tenderness: There is no abdominal tenderness  Musculoskeletal:         General: No swelling  Cervical back: Neck supple  Skin:     General: Skin is warm and dry     Neurological: General: No focal deficit present  Mental Status: She is alert  Comments: Cognitive impairment          Additional Data:     Labs:  Results from last 7 days   Lab Units 06/25/21  1027 06/22/21  0611   WBC Thousand/uL 7 25 6 04   HEMOGLOBIN g/dL 8 3* 8 0*   HEMATOCRIT % 26 0* 26 0*   PLATELETS Thousands/uL 135* 142*   NEUTROS PCT %  --  36*   LYMPHS PCT %  --  45*   MONOS PCT %  --  9   EOS PCT %  --  5     Results from last 7 days   Lab Units 06/24/21  0630 06/19/21  0738   SODIUM mmol/L 143 148*   POTASSIUM mmol/L 3 8 3 9   CHLORIDE mmol/L 108 113*   CO2 mmol/L 31 33*   BUN mg/dL 15 37*   CREATININE mg/dL 1 15 1 56*   ANION GAP mmol/L 4 2*   CALCIUM mg/dL 8 8 9 4   ALBUMIN g/dL  --  3 1*   TOTAL BILIRUBIN mg/dL  --  0 32   ALK PHOS U/L  --  77   ALT U/L  --  21   AST U/L  --  14   GLUCOSE RANDOM mg/dL 119 159*     Results from last 7 days   Lab Units 06/19/21  0738   INR  1 19                   Lines/Drains:  Invasive Devices     Drain            Urethral Catheter Double-lumen 22 Fr  2 days              Urinary Catheter:  Goal for removal: N/A - Chronic Davis            Today, Patient Was Seen By: Linda Bansal MD    **Please Note: This note may have been constructed using a voice recognition system  **

## 2021-06-26 NOTE — ASSESSMENT & PLAN NOTE
· Ct BB, digoxin  · Per discussion with facility was not on Eliquis  · WAs on ASA   · D/w daughter - taken off hospice around 7 months ago - some meds to be restarted  · Per urology continue to hold [facility] for now -  Discuss restarting when seen by urology in the office

## 2021-06-26 NOTE — ASSESSMENT & PLAN NOTE
Wt Readings from Last 3 Encounters:   06/25/21 76 kg (167 lb 8 8 oz)   06/18/21 81 5 kg (179 lb 10 8 oz)   06/01/21 78 4 kg (172 lb 13 5 oz)     Euvolemic at present  Lasix restarted

## 2021-06-29 ENCOUNTER — TELEPHONE (OUTPATIENT)
Dept: NEPHROLOGY | Facility: CLINIC | Age: 76
End: 2021-06-29

## 2021-06-29 NOTE — TELEPHONE ENCOUNTER
Called Ar[facility] l[facility]a at [phone] and remind them that pt has appt scheduled for 07/06/21 and needs labs prior  Spoke with the nurse and they will send us lab results as soon as labs are done

## 2021-07-01 NOTE — TELEPHONE ENCOUNTER
[facility] to confirm appointment Nolberto Freeman informed her appointment was at [facility] she stated appointments with urology and neph were both scheduled [facility],please contact her [phone] or [phone]

## 2021-07-01 NOTE — TELEPHONE ENCOUNTER
Left detailed message for Ana Dang regarding patient that unfortunately there is no availability at [facility] within time frame of recommended appointment  If Ana Dang calls back please relay this information

## 2021-07-06 ENCOUNTER — OFFICE VISIT (OUTPATIENT)
Dept: UROLOGY | Facility: CLINIC | Age: 76
End: 2021-07-06
Payer: MEDICARE

## 2021-07-06 ENCOUNTER — TELEPHONE (OUTPATIENT)
Dept: HEMATOLOGY ONCOLOGY | Facility: CLINIC | Age: 76
End: 2021-07-06

## 2021-07-06 ENCOUNTER — PATIENT OUTREACH (OUTPATIENT)
Dept: UROLOGY | Facility: CLINIC | Age: 76
End: 2021-07-06

## 2021-07-06 VITALS — HEIGHT: 57 IN | DIASTOLIC BLOOD PRESSURE: 66 MMHG | BODY MASS INDEX: 36.26 KG/M2 | SYSTOLIC BLOOD PRESSURE: 124 MMHG

## 2021-07-06 DIAGNOSIS — F02.81 DEMENTIA ASSOCIATED WITH OTHER UNDERLYING DISEASE WITH BEHAVIORAL DISTURBANCE (HCC): ICD-10-CM

## 2021-07-06 DIAGNOSIS — E66.01 OBESITY, MORBID (HCC): ICD-10-CM

## 2021-07-06 DIAGNOSIS — C67.2 MALIGNANT NEOPLASM OF LATERAL WALL OF URINARY BLADDER (HCC): Primary | ICD-10-CM

## 2021-07-06 PROCEDURE — 99215 OFFICE O/P EST HI 40 MIN: CPT | Performed by: UROLOGY

## 2021-07-06 RX ORDER — LACTULOSE 10 G/15ML
SOLUTION ORAL
COMMUNITY
Start: 2021-05-08 | End: 2021-07-09 | Stop reason: HOSPADM

## 2021-07-06 RX ORDER — LORAZEPAM 2 MG/ML
CONCENTRATE ORAL
COMMUNITY
Start: 2021-05-08 | End: 2021-07-09 | Stop reason: SDUPTHER

## 2021-07-06 RX ORDER — VALPROIC ACID 250 MG/5ML
SOLUTION ORAL
COMMUNITY
Start: 2021-06-08 | End: 2021-07-09 | Stop reason: ALTCHOICE

## 2021-07-06 NOTE — PROGRESS NOTES
I was present at [facility] visit with Dr Halina Allen today  Her daughter was also at the visit  Her daughter comes to all of Debi's visits since Pepe Calvillo has dementia  Pepe Calvillo is in a wheelchair  She resides at [facility]  Dr Halina Allen discussed Debi's pathology at the visit today  She is not a surgical candidate and Dr Halina Allen referred her to Medical Oncology, Radiation Oncology and Palliative Care  Email sent to [address] [place] [address] and Rad Onc to [employer] (daughter) cell with consult appointments  Veronique Braga will then make [facility] aware of appointments and arrange transport with them  Veronique Braga was interested in a Palliative Care consult and was instructed to schedule Palliative Care consult after Med Onc and Rad Onc consult so they can discuss goals of care after they have treatment options  All of Shirley's questions were answered and she was given my direct contact information and encouraged her to call if she needs assistance

## 2021-07-06 NOTE — PROGRESS NOTES
Referring Physician: Nathalie Fabian DO  A copy of this note was sent to the referring physician  Diagnoses and all orders for this visit:    Malignant neoplasm of lateral wall of urinary bladder ([facility] )  -     Ambulatory referral to Radiation Oncology; Future  -     Ambulatory referral to Hematology / Oncology; Future  -     Cancel: Ambulatory referral to Palliative Care; Future  -     Ambulatory referral to Palliative Care; Future    Dementia associated with other underlying disease with behavioral disturbance ([facility] )    Obesity, morbid ([facility] )    Other orders  -     valproic acid (DEPAKENE) 250 MG/5ML soln  -     lactulose (CHRONULAC) 10 g/15 mL solution  -     LORazepam (ATIVAN) 2 mg/mL concentrated solution            Assessment and plan:       1  Recently diagnosed muscle invasive bladder cancer  - cT2 (muscle invasion, CT negative for local regional extravesical disease, metastatic disease, or hydronephrosis    2  Chronic long term indwelling Davis catheter  -orders provided to nursing facility for regular exchanges  - no specific precautions are necessary due to her bladder cancer    3  Advanced Alzheimer's dementia    4   solitary kidney with chronic kidney disease    Had a long discussion with the patient and her darby daughter  I discussed with the patient the details regarding their bladder cancer, including the risks of recurrence, progression and metastasis  We went over the various alternatives for managing the situation including TURBT alone, chemoradiation therapy, and radical cystectomy  A particular emphasis was placed on how these options apply (or don't apply) to the patient's current situation  Urinary diversions were also discussed in detail, including the various types of diversions (conduits, continent catheterizable pouches, and neobladders) along with their risks, benefits and long term sequelae and how they apply (or don't apply) to the patient's current situation    The perioperative morbidity and mortality associated with the radical cystectomy procedure was discussed in detail and the normal postoperative course was presented  In my opinion the patient is a poor candidate for radical cysto prostatectomy  We therefore discussed pelvic radiation to facilitate local control along with options for systemic treatment  I believe the patient would be best served with you if these 2 modalities and the patient's daughter agrees  We discussed that systemic treatment could be offered either for chemo sensitization or potentially immunotherapy could be utilized  Patient's daughter who serves as her power of , remains unclear if they would elect to pursue any definitive treatment  These reasons we did discuss a referral to palliative care the believe she would benefit from this  Referral has been placed there is well    Comprehensive plan is as follows:  -referral placed to Medical Oncology and Radiation Oncology  -patient will meet with them in consultation and will also schedule an evaluation of palliative care to discuss supportive care  - will tentatively plan for follow-up with me for cystoscopy in 6 months time        Patient was seen along with our oncology nurse navigator today to assist in coordination of care for this medically complex patient      I have spent 48 minutes with Patient and family today in which greater than 50% of this time was spent in counseling/coordination of care regarding Diagnostic results, Prognosis, Risks and benefits of tx options, Risk factor reductions and Impressions  Master Kaur MD      Chief Complaint     TURBT follow-up      History of Present Illness     Ekta Diane is a 77 y o  female returns in follow-up  He is known to me from a recent hospital admission in which she presented with gross hematuria  Patient has a history of a chronic indwelling Davis catheter for number of years    He has a history of advanced Alzheimer's dementia and is a resident of a nursing facility  He did undergo cystoscopy with biopsy with an outside provider in 2011 which appeared to be negative for urothelial carcinoma at that time  I brought the patient for cystoscopy and clot evacuation  At which time a bladder tumors identified  Ultimately the patient was brought for a formal trans urethral resection which I performed on June 23rd  Intraoperative findings were suspicious for muscle invasive disease  An extensive 8 cm tumor was resected aggressively  Patient did well postoperatively and has been discharge back to a nursing facility  Davis catheter remains in place  She has had no hematuria or urinary tract infections  Her daughter does report some sediment  Detailed Urologic History     - please refer to HPI    Review of Systems     Review of Systems   Constitutional: Negative for activity change and fatigue  HENT: Negative for congestion  Eyes: Negative for visual disturbance  Respiratory: Negative for shortness of breath and wheezing  Cardiovascular: Negative for chest pain and leg swelling  Gastrointestinal: Negative for abdominal pain  Endocrine: Negative for polyuria  Genitourinary: Negative for dysuria, flank pain, hematuria and urgency  As per HPI   Musculoskeletal: Negative for back pain  Allergic/Immunologic: Negative for immunocompromised state  Neurological: Negative for dizziness and numbness  Psychiatric/Behavioral: Negative for dysphoric mood  All other systems reviewed and are negative              Allergies     Allergies   Allergen Reactions    Dye [Iodinated Diagnostic Agents] Other (See Comments)     allergy    Spiriva Handihaler [Tiotropium Bromide Monohydrate] Swelling    Ramipril Facial Swelling    Penicillins      unsure       Physical Exam     Physical Exam        Vital Signs  Vitals:    07/06/21 0940   BP: 124/66   Height: 4' 9" (1 448 m)         Current Medications       Current Outpatient Medications:     albuterol (PROVENTIL HFA,VENTOLIN HFA) 90 mcg/act inhaler, Inhale 2 puffs every 6 (six) hours as needed for wheezing, Disp: 1 Inhaler, Rfl: 0    buPROPion (WELLBUTRIN SR) 150 mg 12 hr tablet, Take 1 tablet (150 mg total) by mouth daily, Disp: 30 tablet, Rfl: 0    carBAMazepine (TEGretol) 100 mg chewable tablet, Chew 1 tablet (100 mg total) 3 (three) times a day, Disp: 90 tablet, Rfl: 0    Cholecalciferol (VITAMIN D) 2000 units CAPS, Take 1 capsule (2,000 Units total) by mouth daily, Disp: 14 capsule, Rfl: 0    digoxin (LANOXIN) 0 125 mg tablet, Take 125 mcg by mouth daily, Disp: , Rfl:     divalproex sodium (DEPAKOTE) 250 mg EC tablet, Take 250 mg by mouth every 12 (twelve) hours , Disp: , Rfl:     ferrous sulfate 325 (65 Fe) mg tablet, Take 1 tablet (325 mg total) by mouth 2 (two) times a day, Disp: 30 tablet, Rfl: 0    furosemide (LASIX) 20 mg tablet, Take 1 tablet (20 mg total) by mouth daily, Disp: , Rfl:     lactulose (CHRONULAC) 10 g/15 mL solution, , Disp: , Rfl:     levothyroxine 100 mcg tablet, Take 1 tablet (100 mcg total) by mouth daily, Disp: , Rfl: 0    LORazepam (ATIVAN) 0 5 mg tablet, Take 1 tablet (0 5 mg total) by mouth daily at bedtime, Disp: 3 tablet, Rfl: 0    LORazepam (ATIVAN) 2 mg/mL concentrated solution, , Disp: , Rfl:     melatonin 3 mg, Take 6 mg by mouth daily at bedtime, Disp: , Rfl:     metoprolol tartrate (LOPRESSOR) 100 mg tablet, Take 1 tablet (100 mg total) by mouth every 12 (twelve) hours, Disp: 180 tablet, Rfl: 3    nitroglycerin (NITROSTAT) 0 4 mg SL tablet, Place 1 tablet (0 4 mg total) under the tongue every 5 (five) minutes as needed for chest pain, Disp: 90 tablet, Rfl: 0    potassium chloride (K-DUR,KLOR-CON) 20 mEq tablet, Take 1 tablet (20 mEq total) by mouth daily, Disp: 15 tablet, Rfl: 0    senna (SENOKOT) 8 6 MG tablet, Take 1 tablet by mouth daily, Disp: , Rfl:     valproic acid (DEPAKENE) 250 MG/5ML soln, , Disp: , Rfl:       Active Problems     Patient Active Problem List   Diagnosis    Elevated troponin    COPD [facility]ch[facility] obstructive pulmonary disease) [facility]    Chronic diastolic heart failure (HCC)    Paroxysmal atrial flutter (HCC)    Back pain    Dementia (HCC)    Hyperlipidemia    Urinary tract infection associated with catheterization of [facility] tract [facility]    RSV infection    Ambulatory dysfunction    Hypernatremia    Stage 3 chronic kidney disease    Lower extremity edema    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    JESENIA (obstructive sleep apnea)    Intermittent claudication of right lower extremity due to atherosclerosis (HCC)    Atrial fibrillation (HCC)    Chronic indwelling Davis catheter    Bradycardia    Acute on chronic respiratory failure with hypoxia ([facility])    Essential hypertension    Macrocytic anemia    Aortoiliac occlusive disease ([facility] )    HCAP (healthcare-associated pneumonia)    Coronary artery disease involving native coronary artery of native heart without angina pectoris    Hypothyroidism    PAD (peripheral artery disease) ([facility]    History of cerebral aneurysm repair    Urinary retention    TAM (acute kidney injury) ([facility] )    Hypertensive kidney disease with stage 3 chronic kidney disease ([facility])    Azotemia    Paroxysmal atrial fibrillation (HCC)    Vomiting    Pneumonia due to COVID-19 virus    Hypoxia    COVID-19 virus infection    Gram-negative bacteremia    Sepsis ([facility]    Gross hematuria    Acute blood loss anemia    Urothelial carcinoma (HCC)    Malignant neoplasm of lateral wall of urinary bladder (HCC)    Obesity, morbid ([facility])         Past Medical History     Past Medical History:   Diagnosis Date    Atrial fibrillation ([facility]     Brain aneurysm     CAD (coronary artery disease)     Cardiac disease     had stents [facility] years ago in [facility]    CKD (chronic kidney disease) stage 3, GFR 30-59 ml/min ([facility]) ckd  COPD (chronic obstructive pulmonary disease) (HCC)     Dementia (HCC)     Diastolic CHF, chronic (HCC)     GERD (gastroesophageal reflux disease)     Hyperlipidemia     Hyperlipidemia     Hypertension     Hypothyroidism     Migraine     PAD (peripheral artery disease) (HCC)     Renal disorder     Seizures (HCC)     Stroke [facility])          Surgical History     Past Surgical History:   Procedure Laterality Date    APPENDECTOMY      ARTERIOGRAM N/A 2018    Procedure: ARTERIOGRAM WITH STENT PLACEMENT;  Surgeon: Brian Reddy MD;  [facility];  Service: Vascular    BLADDER TUMOR EXCISION     [address]  clip right frontal lobe    CARDIAC SURGERY      COLONOSCOPY      CORONARY STENT PLACEMENT      5 stents    EYE SURGERY      IR AORTAGRAM WITH RUN-OFF  2018    MANDIBLE FRACTURE SURGERY      OK CYSTOURETHROSCOPY W/IRRIG & EVAC CLOTS N/A 2021    Procedure: CYSTOSCOPY EVACUATION OF CLOTS;  Surgeon: Normajean Mortimer, MD;  [facility];  Service: Urology    TONSILLECTOMY      TRANSURETHRAL RESECTION OF BLADDER TUMOR N/A 2021    Procedure: TRANSURETHRAL RESECTION OF BLADDER TUMOR (TURBT); Surgeon: Normajean Mortimer, MD;  [facility];  Service: Urology    TUBAL LIGATION      UTERINE FIBROID SURGERY           Family History     Family History   Problem Relation Age of Onset    Heart disease Father     Parkinsonism Father     Macular degeneration Mother     Glaucoma Mother     Diabetes Brother     Heart disease Brother          Social History     Social History     Social History     Tobacco Use   Smoking Status Former Smoker    Packs/day: 5 00    Years: 40 00    Pack years: 200 00    Quit date: 2007    Years since quittin 8   Smokeless Tobacco Never Used         Pertinent Lab Values     Lab Results   Component Value Date    CREATININE 1 15 2021       No results found for: PSA  Final Diagnosis   A   Urinary Bladder, Bladder tumor anterolateral wall:  - Invasive high-grade urothelial carcinoma  - Tumor invades lamina propria and muscularis propria       B  Urinary Bladder, Bladder tumor deep resection:  - Invasive high-grade urothelial carcinoma  - Tumor invades lamina propria and muscularis propria  Pertinent Imaging   FINDINGS:     CHEST     LUNGS:  Multiple pulmonary nodules are again identified which will be detailed on series 3;     -Previously described 6 mm right middle lobe nodule (formerly series 3, image 81) appears less conspicuous currently measuring about 4 mm image 64   -4 mm nodule right middle lobe, image 57, stable  -3 mm right middle lobe nodule image 53, stable  -3 mm left apical nodule image 17, stable   -2 mm anterior lingular nodule image 51, probably measured 1 mm on the 2017 study   -3 mm posterior left lower lobe superior segment nodule, stable   -Linear/tubular opacity left lower lobe abutting a posterior vessel measuring 1 4 x 0 4 cm similar to study September 2018   -4 mm left lower lobe nodule image 75, probably stable   -A few additional nodules are noted, similar      Minor bibasilar subsegmental atelectasis  Scattered calcified granulomas in both lungs      No endobronchial lesions      PLEURA:  Unremarkable      HEART/GREAT VESSELS:  The heart is top normal to borderline enlarged  Tiny pericardial effusion  Atherosclerotic changes thoracic aorta and coronary arteries      MEDIASTINUM AND ANNE:  Unremarkable      CHEST WALL AND LOWER NECK:   Unremarkable      ABDOMEN     LIVER/BILIARY TREE:  No focal hepatic lesions  CBD measures up to 7 mm which may be upper normal for patient age      GALLBLADDER:  There are gallstone(s) within the gallbladder, without pericholecystic inflammatory changes      SPLEEN:  Unremarkable      PANCREAS:  Mild fatty replacement of the pancreas without acute findings      ADRENAL GLANDS:  Unremarkable      KIDNEYS/URETERS:  Absent right kidney  Cortical scarring left kidney with extrarenal pelvis and hilar vascular calcifications noted      STOMACH AND BOWEL:    Mild circumferential rectal wall thickening could be accentuated by underdistention  No enlarged perirectal adenopathy      APPENDIX:  No findings to suggest appendicitis      ABDOMINOPELVIC CAVITY:    No pneumoperitoneum  No enlarged lymphadenopathy  Trace presacral fluid      VESSELS: Atherosclerotic changes abdominal aorta without evidence of aneurysm       PELVIS     REPRODUCTIVE ORGANS:  Unremarkable for patient's age      URINARY BLADDER:  There is bladder is limited by Davis catheter decompression  Intraluminal air noted  There is perivesical stranding and/or fluid which is nonspecific  No dominant bladder mass seen      ABDOMINAL WALL/INGUINAL REGIONS:  Modest sized right paramedian fat-containing ventral abdominal wall hernia      OSSEOUS STRUCTURES:  No acute fracture or destructive osseous lesion  Motion artifact right proximal humerus  Degenerative changes of the spine  Hemangiomas T4, T6 and L2 vertebrae      IMPRESSION:     1  No definitive evidence for metastatic disease        2  Numerous small pulmonary nodules appear stable, likely benign, possibly sequela of previous granulomatous disease      3  Limited evaluation of the urinary bladder, decompressed by Davis catheter with nonspecific perivesical stranding and fluid  No dominant bladder mass seen within limitations      4   Mild circumferential rectal wall thickening could be accentuated by underdistention  Correlate with physical exam        5  Cholelithiasis      Additional incidental findings as above        Portions of the record may have been created with voice recognition software   Occasional wrong word or "sound a like" substitutions may have occurred due to the inherent limitations of voice recognition software   Read the chart carefully and recognize, using context, where substitutions have occurred

## 2021-07-06 NOTE — TELEPHONE ENCOUNTER
New Patient  Form     Patient Details: Sherryle Righter     1945     [id number]     Background Information:     [address] starts by opening a telephone encounter and gathering the following information     Who is calling to schedule and relationship? Child   Who is the referring provider? Opal Bishop   Reason for Visit? New Diagnosis   Tumor Type? Bladder CAncer   Is this Cancer or Non-Cancer? Cancer   Does the patient have confirmed tissue pathology with either biopsy or surgery? yes   When was biopsy/surgery (diagnosis made) and where 6/19/21   Does the Patient have a Urologist?  Yes   Name of Patient's Urologist Opal Bishop   Did the patient have imaging done?  yes   If yes, when and where  [facility]   Did patient have blood work done?  yes   If Yes, when and where [facility]   *Please contact  Navigator for review if Urology is NOT making the referral to Med Onc, or     They answer No to Urologist and Pathology question  *     Scheduling Information:     [facility]   Are there any days the patient cannot be seen?  need a Thursday or Friday   Are you willing to see another provider if we can schedule your visit earlier? yes   Miscellaneous:    After completing the above information, please route to finance, nurse navigation and clinical trials for review

## 2021-07-08 ENCOUNTER — TELEPHONE (OUTPATIENT)
Dept: NEPHROLOGY | Facility: CLINIC | Age: 76
End: 2021-07-08

## 2021-07-08 ENCOUNTER — PATIENT OUTREACH (OUTPATIENT)
Dept: UROLOGY | Facility: CLINIC | Age: 76
End: 2021-07-08

## 2021-07-08 NOTE — TELEPHONE ENCOUNTER
Appointment was confirmed and went over registration details with patient daughter Para Boby Rascon,

## 2021-07-08 NOTE — PROGRESS NOTES
Heraclio Etienne (daughter) called  She attends all of Dbei's appointments  She cannot make appointment with Radiation Oncology on 7/12/21  She is requesting consult visit on a Thursday or Friday if possible due to her work schedule  Please call Heraclio Etienne to schedule Rad Onc consult  She will contact [address] [place] [address] with the new appointment so they can arrange transportation  [facility] is scheduled for Med Onc consult on 7/23/21

## 2021-07-09 ENCOUNTER — OFFICE VISIT (OUTPATIENT)
Dept: NEPHROLOGY | Facility: CLINIC | Age: 76
End: 2021-07-09
Payer: MEDICARE

## 2021-07-09 VITALS
HEIGHT: 57 IN | DIASTOLIC BLOOD PRESSURE: 60 MMHG | BODY MASS INDEX: 36.42 KG/M2 | TEMPERATURE: 97.3 F | HEART RATE: 54 BPM | SYSTOLIC BLOOD PRESSURE: 112 MMHG | WEIGHT: 168.8 LBS | RESPIRATION RATE: 16 BRPM

## 2021-07-09 DIAGNOSIS — N18.32 STAGE 3B CHRONIC KIDNEY DISEASE (HCC): Primary | Chronic | ICD-10-CM

## 2021-07-09 DIAGNOSIS — I12.9 HYPERTENSIVE KIDNEY DISEASE WITH STAGE 3B CHRONIC KIDNEY DISEASE (HCC): ICD-10-CM

## 2021-07-09 DIAGNOSIS — E55.9 VITAMIN D DEFICIENCY: ICD-10-CM

## 2021-07-09 DIAGNOSIS — N18.32 HYPERTENSIVE KIDNEY DISEASE WITH STAGE 3B CHRONIC KIDNEY DISEASE (HCC): ICD-10-CM

## 2021-07-09 PROCEDURE — 99214 OFFICE O/P EST MOD 30 MIN: CPT | Performed by: INTERNAL MEDICINE

## 2021-07-09 RX ORDER — NICOTINE POLACRILEX 4 MG
15 LOZENGE BUCCAL ONCE
COMMUNITY
End: 2021-07-27 | Stop reason: HOSPADM

## 2021-07-09 RX ORDER — IBUPROFEN 600 MG/1
TABLET ORAL
COMMUNITY

## 2021-07-09 RX ORDER — ACETAMINOPHEN 325 MG/1
650 TABLET ORAL EVERY 6 HOURS PRN
COMMUNITY

## 2021-07-09 RX ORDER — SODIUM PHOSPHATE, DIBASIC AND SODIUM PHOSPHATE, MONOBASIC 3.5; 9.5 G/66ML; G/66ML
1 ENEMA RECTAL ONCE
COMMUNITY
End: 2021-07-27 | Stop reason: HOSPADM

## 2021-07-09 NOTE — PROGRESS NOTES
NEPHROLOGY OFFICE FOLLOW UP  Quinn Edwards 77 y o  female Date of Birth: @ MRN: [id number]    Encounter: [id number] DATE: 7/9/2021    REASON FOR VISIT: Quinn Edwards is a 77 y o  female who is here on 7/9/2021 for further management of chronic kidney disease  HPI:     This is 72-year-old female with past medical history significant for neurogenic bladder, chronic indwelling Davis catheter, bladder cancer, COPD, paroxysmal AFib, seizure disorder, returns to Nephrology office for further management of chronic kidney disease stage 3  Patient was admitted to [place] [address]  last month were she was evaluated by Nephrology team  I have also reviewed patient's old medical records from [facility] chart review and previously known baseline creatinine was thought to be around 1 3-1 5   Patient also have congenital absent right kidney   Patient's daughter was also present at the time of consultation and history was also obtained from her and all the questions were answered   According to patient's daughter, patient currently resides at [facility] and mostly using wheelchair  Patient also have underlying urinary bladder cancer- cT2 stage and currently also been followed by urology team and I have personally reviewed their last office note from 6/7/2021 and patient been referred to hematologist and oncologist    During recent hospital stay, patient had underwent trans urethral resection of bladder tumor (6/23/21)  Patient has underlying hypertension which currently seems to be under good control with current antihypertensive medications per     Patient also have vitamin- D deficiency and currently taking cholecalciferol  Patient also have paroxysmal AFib  Currently not taking Eliquis due to recent gross hematuria  Patient is on beta-blocker along with digoxin  Patient also have underlying seizure disorder and currently taking Depakote and Tegretol     Denies seizure episode since recent hospital discharge   Patient denies use of NSAIDs   REVIEW OF SYSTEMS:    Review of Systems   Constitutional: Negative for chills and fever  HENT: Negative for nosebleeds and sore throat  Eyes: Negative for photophobia and pain  Respiratory: Negative for choking and wheezing  Cardiovascular: Negative for chest pain and palpitations  Gastrointestinal: Negative for abdominal pain and blood in stool  Endocrine: Negative for heat intolerance  Genitourinary: Negative for flank pain and hematuria  Musculoskeletal: Negative for joint swelling and neck pain  Skin: Negative for color change and pallor  Neurological: Negative for seizures and syncope  Psychiatric/Behavioral: Negative for confusion and suicidal ideas           PAST MEDICAL HISTORY:  Past Medical History:   Diagnosis Date    Atrial fibrillation ([facility] )     Brain aneurysm     CAD (coronary artery disease)     Cardiac disease     had stents 12 years ago in [facility]    Chronic kidney disease     CKD (chronic kidney disease) stage 3, GFR 30-59 ml/min ([facility]) ckd    COPD (chronic obstructive pulmonary disease) ([facility])     Dementia ([facility])     Diastolic CHF, chronic ([facility])     GERD (gastroesophageal reflux disease)     Hyperlipidemia     Hyperlipidemia     Hypertension     Hypothyroidism     Migraine     PAD (peripheral artery disease) ([facility])     Renal disorder     Seizures ([facility] )     Stroke ([facility] )        PAST SURGICAL HISTORY:  Past Surgical History:   Procedure Laterality Date    APPENDECTOMY      ARTERIOGRAM N/A 12/7/2018    Procedure: ARTERIOGRAM WITH STENT PLACEMENT;  Surgeon: Aniket Reddy MD;  Location: [facility];  Service: Vascular    BLADDER TUMOR EXCISION     [address]  clip right frontal lobe    CARDIAC SURGERY      COLONOSCOPY      CORONARY STENT PLACEMENT      5 stents    EYE SURGERY      IR AORTAGRAM WITH RUN-OFF  12/7/2018    MANDIBLE FRACTURE SURGERY      OR CYSTOURETHROSCOPY W/IRRIG & EVAC CLOTS N/A 2021    Procedure: CYSTOSCOPY EVACUATION OF CLOTS;  Surgeon: Ender Cho MD;  [facility];  Service: Urology    TONSILLECTOMY      TRANSURETHRAL RESECTION OF BLADDER TUMOR N/A 2021    Procedure: TRANSURETHRAL RESECTION OF BLADDER TUMOR (TURBT);   Surgeon: Ender Cho MD;  [facility];  Service: Urology    TUBAL LIGATION      UTERINE FIBROID SURGERY         SOCIAL HISTORY:  Social History     Substance and Sexual Activity   Alcohol Use Not Currently    Alcohol/week: 0 0 standard drinks     Social History     Substance and Sexual Activity   Drug Use No     Social History     Tobacco Use   Smoking Status Former Smoker    Packs/day: 5 00    Years: 40 00    Pack years: 200 00    Quit date: 2007    Years since quittin 8   Smokeless Tobacco Never Used       FAMILY HISTORY:  Family History   Problem Relation Age of Onset    Heart disease Father     Parkinsonism Father     Macular degeneration Mother     Glaucoma Mother     Diabetes Brother     Heart disease Brother        ALLERGY:  Allergies   Allergen Reactions    Dye [Iodinated Diagnostic Agents] Other (See Comments)     allergy    Spiriva Handihaler [Tiotropium Bromide Monohydrate] Swelling    Ramipril Facial Swelling    Penicillins      unsure       MEDICATIONS:    Current Outpatient Medications:     acetaminophen (TYLENOL) 325 mg tablet, Take 650 mg by mouth every 6 (six) hours as needed for mild pain, Disp: , Rfl:     albuterol (PROVENTIL HFA,VENTOLIN HFA) 90 mcg/act inhaler, Inhale 2 puffs every 6 (six) hours as needed for wheezing, Disp: 1 Inhaler, Rfl: 0    bisacodyl (FLEET) 10 MG/30ML ENEM, Insert 10 mg into the rectum as needed for constipation, Disp: , Rfl:     buPROPion (WELLBUTRIN SR) 150 mg 12 hr tablet, Take 1 tablet (150 mg total) by mouth daily, Disp: 30 tablet, Rfl: 0    carBAMazepine (TEGretol) 100 mg chewable tablet, Chew 1 tablet (100 mg total) 3 (three) times a day, Disp: 90 tablet, Rfl: 0    Cholecalciferol (VITAMIN D) 2000 units CAPS, Take 1 capsule (2,000 Units total) by mouth daily, Disp: 14 capsule, Rfl: 0    digoxin (LANOXIN) 0 125 mg tablet, Take 125 mcg by mouth daily, Disp: , Rfl:     divalproex sodium (DEPAKOTE) 250 mg EC tablet, Take 250 mg by mouth every 12 (twelve) hours , Disp: , Rfl:     ferrous sulfate 325 (65 Fe) mg tablet, Take 1 tablet (325 mg total) by mouth 2 (two) times a day, Disp: 30 tablet, Rfl: 0    furosemide (LASIX) 20 mg tablet, Take 1 tablet (20 mg total) by mouth daily, Disp: , Rfl:     Glucagon, rDNA, (Glucagon Emergency) 1 MG KIT, Inject as directed, Disp: , Rfl:     glucose 40 %, Take 15 g by mouth once, Disp: , Rfl:     levothyroxine 100 mcg tablet, Take 1 tablet (100 mcg total) by mouth daily, Disp: , Rfl: 0    LORazepam (ATIVAN) 0 5 mg tablet, Take 1 tablet (0 5 mg total) by mouth daily at bedtime, Disp: 3 tablet, Rfl: 0    magnesium hydroxide (MILK OF MAGNESIA) 400 mg/5 mL oral suspension, Take by mouth daily as needed for constipation, Disp: , Rfl:     melatonin 3 mg, Take 6 mg by mouth daily at bedtime, Disp: , Rfl:     metoprolol tartrate (LOPRESSOR) 100 mg tablet, Take 1 tablet (100 mg total) by mouth every 12 (twelve) hours, Disp: 180 tablet, Rfl: 3    nitroglycerin (NITROSTAT) 0 4 mg SL tablet, Place 1 tablet (0 4 mg total) under the tongue every 5 (five) minutes as needed for chest pain, Disp: 90 tablet, Rfl: 0    potassium chloride (K-DUR,KLOR-CON) 20 mEq tablet, Take 1 tablet (20 mEq total) by mouth daily, Disp: 15 tablet, Rfl: 0    senna (SENOKOT) 8 6 MG tablet, Take 1 tablet by mouth daily, Disp: , Rfl:     sodium phosphate (PEDIA-LAX) 3 5-9 5 g 59 mL enema, Insert 1 enema into the rectum once, Disp: , Rfl:     PHYSICAL EXAM:  Vitals:    07/09/21 1315   BP: 112/60   BP Location: Left arm   Patient Position: Sitting   Cuff Size: Large   Pulse: (!) 54   Resp: 16   Temp: (!) 97 3 °F (36 3 °C)   TempSrc: Temporal   Weight: 76 6 kg (168 lb 12 8 oz)   Height: 4' 9" (1 448 m)     Body mass index is 36 53 kg/m²  Physical Exam  Constitutional:       General: She is not in acute distress  Comments:  Uses wheelchair   HENT:      Head: Normocephalic and atraumatic  Eyes:      General: No scleral icterus  Conjunctiva/sclera: Conjunctivae normal    Neck:      Vascular: No JVD  Cardiovascular:      Rate and Rhythm: Normal rate  Heart sounds: S1 normal and S2 normal    Pulmonary:      Effort: No accessory muscle usage or respiratory distress  Breath sounds: No wheezing  Abdominal:      General: There is no distension  Palpations: Abdomen is soft  Musculoskeletal:      Cervical back: Neck supple  Right ankle: Swelling present  Left ankle: Swelling present  Comments: Moving all extremities   Skin:     General: Skin is warm  Comments: No Jaundice   Neurological:      Mental Status: She is alert  She is not disoriented  Comments: Facial symmetry  Speech is clear   Psychiatric:         Speech: She is communicative  Behavior: Behavior is not combative           LAB RESULTS:  Results for orders placed or performed during the hospital encounter of 06/18/21   [facility]ov[facility]    Specimen: Nasopharyngeal Swab; Nares   Result Value Ref Range    SARS-CoV-2 Negative Negative   TSH, 3rd generation   Result Value Ref Range    TSH 3RD GENERATON 36 800 (H) 0 358 - 3 740 uIU/mL   Comprehensive metabolic panel   Result Value Ref Range    Sodium 148 (H) 136 - 145 mmol/L    Potassium 3 9 3 5 - 5 3 mmol/L    Chloride 113 (H) 100 - 108 mmol/L    CO2 33 (H) 21 - 32 mmol/L    ANION GAP 2 (L) 4 - 13 mmol/L    BUN 37 (H) 5 - 25 mg/dL    Creatinine 1 56 (H) 0 60 - 1 30 mg/dL    Glucose 159 (H) 65 - 140 mg/dL    Calcium 9 4 8 3 - 10 1 mg/dL    Corrected Calcium 10 1 8 3 - 10 1 mg/dL    AST 14 5 - 45 U/L    ALT 21 12 - 78 U/L    Alkaline Phosphatase 77 46 - 116 U/L Total Protein 7 0 6 4 - 8 2 g/dL    Albumin 3 1 (L) 3 5 - 5 0 g/dL    Total Bilirubin 0 32 0 20 - 1 00 mg/dL    eGFR 32 ml/min/1 73sq m   Magnesium   Result Value Ref Range    Magnesium 2 6 1 6 - 2 6 mg/dL   Phosphorus   Result Value Ref Range    Phosphorus 3 9 2 3 - 4 1 mg/dL   CBC and differential   Result Value Ref Range    WBC 9 08 4 31 - 10 16 Thousand/uL    RBC 3 08 (L) 3 81 - 5 12 Million/uL    Hemoglobin 10 4 (L) 11 5 - 15 4 g/dL    Hematocrit 33 0 (L) 34 8 - 46 1 %     (H) 82 - 98 fL    MCH 33 8 26 8 - 34 3 pg    MCHC 31 5 31 4 - 37 4 g/dL    RDW 13 2 11 6 - 15 1 %    MPV 11 6 8 9 - 12 7 fL    Platelets 358 762 - 907 Thousands/uL    nRBC 0 /100 WBCs    Neutrophils Relative 62 43 - 75 %    Immat GRANS % 1 0 - 2 %    Lymphocytes Relative 26 14 - 44 %    Monocytes Relative 9 4 - 12 %    Eosinophils Relative 2 0 - 6 %    Basophils Relative 0 0 - 1 %    Neutrophils Absolute 5 60 1 85 - 7 62 Thousands/µL    Immature Grans Absolute 0 09 0 00 - 0 20 Thousand/uL    Lymphocytes Absolute 2 36 0 60 - 4 47 Thousands/µL    Monocytes Absolute 0 82 0 17 - 1 22 Thousand/µL    Eosinophils Absolute 0 18 0 00 - 0 61 Thousand/µL    Basophils Absolute 0 03 0 00 - 0 10 Thousands/µL   Protime-INR   Result Value Ref Range    Protime 15 1 (H) 11 6 - 14 5 seconds    INR 1 19 0 84 - 1 19   APTT   Result Value Ref Range    PTT 27 23 - 37 seconds   CBC and differential   Result Value Ref Range    WBC 6 24 4 31 - 10 16 Thousand/uL    RBC 2 58 (L) 3 81 - 5 12 Million/uL    Hemoglobin 8 8 (L) 11 5 - 15 4 g/dL    Hematocrit 28 1 (L) 34 8 - 46 1 %     (H) 82 - 98 fL    MCH 34 1 26 8 - 34 3 pg    MCHC 31 3 (L) 31 4 - 37 4 g/dL    RDW 13 3 11 6 - 15 1 %    MPV 11 9 8 9 - 12 7 fL    Platelets 840 883 - 037 Thousands/uL    nRBC 0 /100 WBCs    Neutrophils Relative 51 43 - 75 %    Immat GRANS % 1 0 - 2 %    Lymphocytes Relative 35 14 - 44 %    Monocytes Relative 9 4 - 12 %    Eosinophils Relative 3 0 - 6 %    Basophils Relative 1 0 - 1 %    Neutrophils Absolute 3 21 1 85 - 7 62 Thousands/µL    Immature Grans Absolute 0 05 0 00 - 0 20 Thousand/uL    Lymphocytes Absolute 2 17 0 60 - 4 47 Thousands/µL    Monocytes Absolute 0 57 0 17 - 1 22 Thousand/µL    Eosinophils Absolute 0 21 0 00 - 0 61 Thousand/µL    Basophils Absolute 0 03 0 00 - 0 10 Thousands/µL   Basic metabolic panel   Result Value Ref Range    Sodium 149 (H) 136 - 145 mmol/L    Potassium 4 0 3 5 - 5 3 mmol/L    Chloride 115 (H) 100 - 108 mmol/L    CO2 31 21 - 32 mmol/L    ANION GAP 3 (L) 4 - 13 mmol/L    BUN 33 (H) 5 - 25 mg/dL    Creatinine 1 34 (H) 0 60 - 1 30 mg/dL    Glucose 132 65 - 140 mg/dL    Calcium 8 8 8 3 - 10 1 mg/dL    eGFR 39 ml/min/1 73sq m   CBC and differential   Result Value Ref Range    WBC 5 88 4 31 - 10 16 Thousand/uL    RBC 2 38 (L) 3 81 - 5 12 Million/uL    Hemoglobin 8 3 (L) 11 5 - 15 4 g/dL    Hematocrit 26 3 (L) 34 8 - 46 1 %     (H) 82 - 98 fL    MCH 34 9 (H) 26 8 - 34 3 pg    MCHC 31 6 31 4 - 37 4 g/dL    RDW 13 3 11 6 - 15 1 %    MPV 11 9 8 9 - 12 7 fL    Platelets 556 (L) 745 - 390 Thousands/uL    nRBC 0 /100 WBCs   Basic metabolic panel   Result Value Ref Range    Sodium 152 (H) 136 - 145 mmol/L    Potassium 4 0 3 5 - 5 3 mmol/L    Chloride 119 (H) 100 - 108 mmol/L    CO2 29 21 - 32 mmol/L    ANION GAP 4 4 - 13 mmol/L    BUN 31 (H) 5 - 25 mg/dL    Creatinine 1 39 (H) 0 60 - 1 30 mg/dL    Glucose 127 65 - 140 mg/dL    Calcium 9 0 8 3 - 10 1 mg/dL    eGFR 37 ml/min/1 73sq m   T4, free   Result Value Ref Range    Free T4 0 78 0 76 - 1 46 ng/dL   Sodium   Result Value Ref Range    Sodium 149 (H) 136 - 145 mmol/L   Basic metabolic panel   Result Value Ref Range    Sodium 149 (H) 136 - 145 mmol/L    Potassium 3 9 3 5 - 5 3 mmol/L    Chloride 116 (H) 100 - 108 mmol/L    CO2 31 21 - 32 mmol/L    ANION GAP 2 (L) 4 - 13 mmol/L    BUN 24 5 - 25 mg/dL    Creatinine 1 36 (H) 0 60 - 1 30 mg/dL    Glucose 124 65 - 140 mg/dL    Calcium 8 8 8 3 - 10 1 mg/dL    eGFR 38 ml/min/1 73sq m   CBC and differential   Result Value Ref Range    WBC 6 04 4 31 - 10 16 Thousand/uL    RBC 2 40 (L) 3 81 - 5 12 Million/uL    Hemoglobin 8 0 (L) 11 5 - 15 4 g/dL    Hematocrit 26 0 (L) 34 8 - 46 1 %     (H) 82 - 98 fL    MCH 33 3 26 8 - 34 3 pg    MCHC 30 8 (L) 31 4 - 37 4 g/dL    RDW 13 2 11 6 - 15 1 %    MPV 11 7 8 9 - 12 7 fL    Platelets 017 (L) 966 - 390 Thousands/uL    nRBC 0 /100 WBCs    Neutrophils Relative 36 (L) 43 - 75 %    Immat GRANS % 4 (H) 0 - 2 %    Lymphocytes Relative 45 (H) 14 - 44 %    Monocytes Relative 9 4 - 12 %    Eosinophils Relative 5 0 - 6 %    Basophils Relative 1 0 - 1 %   Basic metabolic panel   Result Value Ref Range    Sodium 145 136 - 145 mmol/L    Potassium 4 2 3 5 - 5 3 mmol/L    Chloride 113 (H) 100 - 108 mmol/L    CO2 28 21 - 32 mmol/L    ANION GAP 4 4 - 13 mmol/L    BUN 20 5 - 25 mg/dL    Creatinine 1 19 0 60 - 1 30 mg/dL    Glucose 138 65 - 140 mg/dL    Calcium 8 8 8 3 - 10 1 mg/dL    eGFR 45 ml/min/1 73sq m   Basic metabolic panel   Result Value Ref Range    Sodium 143 136 - 145 mmol/L    Potassium 3 8 3 5 - 5 3 mmol/L    Chloride 108 100 - 108 mmol/L    CO2 31 21 - 32 mmol/L    ANION GAP 4 4 - 13 mmol/L    BUN 15 5 - 25 mg/dL    Creatinine 1 15 0 60 - 1 30 mg/dL    Glucose 119 65 - 140 mg/dL    Calcium 8 8 8 3 - 10 1 mg/dL    eGFR 47 ml/min/1 73sq m   CBC and Platelet   Result Value Ref Range    WBC 5 60 4 31 - 10 16 Thousand/uL    RBC 2 28 (L) 3 81 - 5 12 Million/uL    Hemoglobin 7 9 (L) 11 5 - 15 4 g/dL    Hematocrit 24 4 (L) 34 8 - 46 1 %     (H) 82 - 98 fL    MCH 34 6 (H) 26 8 - 34 3 pg    MCHC 32 4 31 4 - 37 4 g/dL    RDW 13 5 11 6 - 15 1 %    Platelets 828 179 - 601 Thousands/uL    MPV 11 8 8 9 - 12 7 fL   CBC and Platelet   Result Value Ref Range    WBC 7 25 4 31 - 10 16 Thousand/uL    RBC 2 38 (L) 3 81 - 5 12 Million/uL    Hemoglobin 8 3 (L) 11 5 - 15 4 g/dL    Hematocrit 26 0 (L) 34 8 - 46 1 %     (H) 82 - 98 fL    MCH 34 9 (H) 26 8 - 34 3 pg    MCHC 31 9 31 4 - 37 4 g/dL    RDW 14 2 11 6 - 15 1 %    Platelets 458 (L) 836 - 390 Thousands/uL    MPV 12 2 8 9 - 12 7 fL   Tissue Exam   Result Value Ref Range    Case Report       Surgical Pathology Report                         Case: [id number]                                   Authorizing Provider:  Dipak Carlson MD    Collected:           06/19/2021 2006              Ordering Location:     [address] [place] [address]      Received:            06/20/2021 [facility] Operating Room                                                      Pathologist:           John Escobar MD                                                        Specimen:    Urinary Bladder, bladder biopsy                                                            Final Diagnosis       A  Bladder, biopsy:    -  Portion of high-grade urothelial carcinoma (see note)  -  Muscularis propria is not sampled  Note       The sample shows a portion of urothelial mucosa with high-grade carcinoma with background inflammation  Residual intact architecture is not present  Focal areas of irregular tumor nests are noted raising the possibility of superficial lamina propria invasion, though in the absence of architectural integrity a definitive assessment of invasion cannot be performed in this small sample  Muscularis propria is not sampled in this material   Correlation with clinical and cystoscopic impression is recommended  Intradepartmental consultation was obtained with agreement  Additional Information       All reported additional testing was performed with appropriately reactive controls    These tests were developed and their performance characteristics determined by [facility] or appropriate performing facility, though some tests may be performed on tissues which have not been validated for performance characteristics (such as staining performed on alcohol exposed cell blocks and decalcified tissues)  Results should be interpreted with caution and in the context of the patients clinical condition  These tests may not be cleared or approved by the [place]  Food and Drug Administration, though the FDA has determined that such clearance or approval is not necessary  These tests are used for clinical purposes and they should not be regarded as investigational or for research  This laboratory has been approved by [place] [address], designated as a high-complexity laboratory and is qualified to perform these tests  Interpretation performed at [place] [address]        Gross Description          A  The specimen is received in formalin, labeled with the patient's name and hospital number, and is designated "bladder biopsy  The specimen consists of 2 white-tan soft tissue fragments each measuring 0 3 cm  Entirely submitted  Screened cassette  Note: The estimated total formalin fixation time based upon information provided by the submitting clinician and the standard processing schedule is under 72 hours  MCrites          Tissue Exam   Result Value Ref Range    Case Report       Surgical Pathology Report                         Case: [id number]                                   Authorizing Provider:  Master Kaur MD    Collected:           06/23/2021 0955              Ordering Location:     [address] [place] [address]      Received:            06/23/2021 [address] [place] [address] Operating Room                                                      Pathologist:           Leora Bauer MD                                                        Specimens:   A) - Urinary Bladder, Bladder tumor Anterolateral wall                                              B) - Urinary Bladder, Bladder tumor deep resection                                         Final Diagnosis       A   Urinary Bladder, Bladder tumor anterolateral wall:  - Invasive high-grade urothelial carcinoma  - Tumor invades lamina propria and muscularis propria  B  Urinary Bladder, Bladder tumor deep resection:  - Invasive high-grade urothelial carcinoma  - Tumor invades lamina propria and muscularis propria  Additional Information       All reported additional testing was performed with appropriately reactive controls  These tests were developed and their performance characteristics determined by [facility] or appropriate performing facility, though some tests may be performed on tissues which have not been validated for performance characteristics (such as staining performed on alcohol exposed cell blocks and decalcified tissues)  Results should be interpreted with caution and in the context of the patients clinical condition  These tests may not be cleared or approved by the [place]  Food and Drug Administration, though the FDA has determined that such clearance or approval is not necessary  These tests are used for clinical purposes and they should not be regarded as investigational or for research  This laboratory has been approved by CLIA 88, designated as a high-complexity laboratory and is qualified to perform these tests  Interpretation performed at [facility], [address] [address]      Gross Description          A  The specimen is received in formalin, labeled with the patient's name and hospital number, and is designated "bladder tumor anterior lateral wall  The specimen consists of multiple tan-pink rubbery and friable soft tissue fragments measuring in aggregate 3 x 2 x 0 4 cm  Entirely submitted  Two cassettes  In embedding bags  B  The specimen is received in formalin, labeled with the patient's name and hospital number, and is designated "bladder tumor deep resection    The specimen consists of multiple tan-pink rubbery and friable soft tissue fragments measuring in aggregate 3 8 x 2 3 x 0  7 cm  Entirely submitted  Three cassettes  In embedding bags  Note: The estimated total formalin fixation time based upon information provided by the submitting clinician and the standard processing schedule is under 72 hours  MCrites         Manual Differential(PHLEBS Do Not Order)   Result Value Ref Range    Segmented % 49 43 - 75 %    Lymphocytes % 37 14 - 44 %    Monocytes % 4 4 - 12 %    Eosinophils, % 7 (H) 0 - 6 %    Basophils % 2 (H) 0 - 1 %    Myelocytes % 1 0 - 1 %    Absolute Neutrophils 2 88 1 85 - 7 62 Thousand/uL    Lymphocytes Absolute 2 18 0 60 - 4 47 Thousand/uL    Monocytes Absolute 0 24 0 00 - 1 22 Thousand/uL    Eosinophils Absolute 0 41 (H) 0 00 - 0 40 Thousand/uL    Basophils Absolute 0 12 (H) 0 00 - 0 10 Thousand/uL    Total Counted      RBC Morphology Normal     Platelet Estimate Decreased (A) Adequate       ASSESSMENT and PLAN:  [facility] was seen today for chronic kidney disease and follow-up  Diagnoses and all orders for this visit:    Stage 3b chronic kidney disease ([facility] )  -     CBC and differential; Future  -     Basic metabolic panel; Future  -     Urinalysis with microscopic; Future  -     Microalbumin / creatinine urine ratio; Future  -     Phosphorus; Future  -     Uric acid; Future  -     PTH, intact; Future  -     Vitamin D 25 hydroxy; Future    Hypertensive kidney disease with stage 3b chronic kidney disease ([facility] )  -     Basic metabolic panel; Future  -     Urinalysis with microscopic; Future  -     Microalbumin / creatinine urine ratio; Future    Vitamin D deficiency  -     Vitamin D 25 hydroxy; Future      1  CKD stage 3   Multifactorial, suspected due to underlying hypertensive 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 )   Upon review of old medical records from [address] [place] [address] Rd chart review, previously known baseline creatinine seems to be around 1 3-1 5 with current creatinine of 1 36 with EGFR of 38       Patient was previously admitted with gross hematuria and was also found to have urinary bladder cancer and had underwent trans urethral resection of bladder tumor on 6/23/2021  Patient was recently seen by urologist in office on 7/6/2021 and patient underlying cancer is cT2 stage and has been referred to oncologist for further evaluation   Patient also currently have indwelling Davis catheter   Management of hypertension as mentioned below   Clinically breathing was stable and patient is not requiring nasal oxygen  Patient is also using Lasix 20 mg PO daily and has some leg swelling   Discussed in length with patient and daughter today that dehydration can decrease renal function in the future and would like to make sure patient is drinking around 2 L of fluid on daily basis to ensure staying well hydrated   Plan to avoid NSAIDs and recheck renal function before next visit   2  Hypertension in chronic kidney disease  Currently blood pressure is under well control and monitor hypertension with metoprolol 100 mg PO BID   Advised to follow low-salt diet   3  Vitamin- D deficiency   Current vitamin-D level is 29  Currently patient is taking cholecalciferol 2000 Units PO daily   Recent calcium level is at goal   Plan to continue cholecalciferol at current dose and recheck vitamin-D level with the next visit   Returns to Nephrology office in 4 months   Future labs ordered   Portions of the record may have been created with voice recognition software  Occasional wrong word or "sound a like" substitutions may have occurred due to the inherent limitations of voice recognition software  Read the chart carefully and recognize, using context, where substitutions have occurred  If you have any questions, please contact the dictating provider

## 2021-07-09 NOTE — LETTER
July 9, 2021     Kole Mckeon, [address] [address]    Patient: Yoni Hwang   YOB: 1945   Date of Visit: 7/9/2021       Dear Dr Landrum Rule: Thank you for referring Yoni Hwang to me for evaluation  Below are my notes for this consultation  If you have questions, please do not hesitate to call me  I look forward to following your patient along with you  Sincerely,        Arnoldo Andre MD        CC: No Recipients  Arnoldo Andre MD  7/9/2021  1:58 PM  Sign when Signing Visit  NEPHROLOGY OFFICE FOLLOW UP  Yoni Hwang 77 y o  female Date of Birth: @ MRN: [id number]    Encounter: [id number] DATE: 7/9/2021    REASON FOR VISIT: Yoni Hwang is a 77 y o  female who is here on 7/9/2021 for further management of chronic kidney disease  HPI:     This is 81-year-old female with past medical history significant for neurogenic bladder, chronic indwelling Davis catheter, bladder cancer, COPD, paroxysmal AFib, seizure disorder, returns to Nephrology office for further management of chronic kidney disease stage 3  Patient was admitted to [place] [address]  last month were she was evaluated by Nephrology team  I have also reviewed patient's old medical records from [facility] chart review and previously known baseline creatinine was thought to be around 1 3-1 5   Patient also have congenital absent right kidney   Patient's daughter was also present at the time of consultation and history was also obtained from her and all the questions were answered   According to patient's daughter, patient currently resides at [facility] and mostly using wheelchair      Patient also have underlying urinary bladder cancer- cT2 stage and currently also been followed by urology team and I have personally reviewed their last office note from 6/7/2021 and patient been referred to hematologist and oncologist    During recent hospital stay, patient had underwent trans urethral resection of bladder tumor (6/23/21)  Patient has underlying hypertension which currently seems to be under good control with current antihypertensive medications per     Patient also have vitamin- D deficiency and currently taking cholecalciferol  Patient also have paroxysmal AFib  Currently not taking Eliquis due to recent gross hematuria  Patient is on beta-blocker along with digoxin  Patient also have underlying seizure disorder and currently taking Depakote and Tegretol   Denies seizure episode since recent hospital discharge   Patient denies use of NSAIDs   REVIEW OF SYSTEMS:    Review of Systems   Constitutional: Negative for chills and fever  HENT: Negative for nosebleeds and sore throat  Eyes: Negative for photophobia and pain  Respiratory: Negative for choking and wheezing  Cardiovascular: Negative for chest pain and palpitations  Gastrointestinal: Negative for abdominal pain and blood in stool  Endocrine: Negative for heat intolerance  Genitourinary: Negative for flank pain and hematuria  Musculoskeletal: Negative for joint swelling and neck pain  Skin: Negative for color change and pallor  Neurological: Negative for seizures and syncope  Psychiatric/Behavioral: Negative for confusion and suicidal ideas           PAST MEDICAL HISTORY:  Past Medical History:   Diagnosis Date    Atrial fibrillation ([facility] )     Brain aneurysm     CAD (coronary artery disease)     Cardiac disease     had stents 12 years ago in [facility]    Chronic kidney disease     CKD (chronic kidney disease) stage 3, GFR 30-59 ml/min ([facility]) ckd    COPD (chronic obstructive pulmonary disease) ([facility])     Dementia ([facility])     Diastolic CHF, chronic ([facility])     GERD (gastroesophageal reflux disease)     Hyperlipidemia     Hyperlipidemia     Hypertension     Hypothyroidism     Migraine     PAD (peripheral artery disease) ([facility])     Renal disorder     Seizures ([facility] )     Stroke [facility])        PAST SURGICAL HISTORY:  Past Surgical History:   Procedure Laterality Date    APPENDECTOMY      ARTERIOGRAM N/A 2018    Procedure: ARTERIOGRAM WITH STENT PLACEMENT;  Surgeon: Leon Reddy MD;  Location: [facility];  Service: Vascular    BLADDER TUMOR EXCISION       [address]  clip right frontal lobe    CARDIAC SURGERY      COLONOSCOPY      CORONARY STENT PLACEMENT      5 stents    EYE SURGERY      IR AORTAGRAM WITH RUN-OFF  2018    MANDIBLE FRACTURE SURGERY      IN CYSTOURETHROSCOPY W/IRRIG & EVAC CLOTS N/A 2021    Procedure: CYSTOSCOPY EVACUATION OF CLOTS;  Surgeon: Dottie Burkett MD;  Location[facility];  Service: Urology    TONSILLECTOMY      TRANSURETHRAL RESECTION OF BLADDER TUMOR N/A 2021    Procedure: TRANSURETHRAL RESECTION OF BLADDER TUMOR (TURBT);   Surgeon: Dottie Burkett MD;  Location[facility];  Service: Urology    TUBAL LIGATION      UTERINE FIBROID SURGERY         SOCIAL HISTORY:  Social History     Substance and Sexual Activity   Alcohol Use Not Currently    Alcohol/week: 0 0 standard drinks     Social History     Substance and Sexual Activity   Drug Use No     Social History     Tobacco Use   Smoking Status Former Smoker    Packs/day: 5 00    Years: 40 00    Pack years: 200 00    Quit date: 2007    Years since quittin 8   Smokeless Tobacco Never Used       FAMILY HISTORY:  Family History   Problem Relation Age of Onset    Heart disease Father     Parkinsonism Father     Macular degeneration Mother     Glaucoma Mother     Diabetes Brother     Heart disease Brother        ALLERGY:  Allergies   Allergen Reactions    Dye [Iodinated Diagnostic Agents] Other (See Comments)     allergy    Spiriva Handihaler [Tiotropium Bromide Monohydrate] Swelling    Ramipril Facial Swelling    Penicillins      unsure       MEDICATIONS:    Current Outpatient Medications:     acetaminophen (TYLENOL) 325 mg tablet, Take 650 mg by mouth every 6 (six) hours as needed for mild pain, Disp: , Rfl:     albuterol (PROVENTIL HFA,VENTOLIN HFA) 90 mcg/act inhaler, Inhale 2 puffs every 6 (six) hours as needed for wheezing, Disp: 1 Inhaler, Rfl: 0    bisacodyl (FLEET) 10 MG/30ML ENEM, Insert 10 mg into the rectum as needed for constipation, Disp: , Rfl:     buPROPion (WELLBUTRIN SR) 150 mg 12 hr tablet, Take 1 tablet (150 mg total) by mouth daily, Disp: 30 tablet, Rfl: 0    carBAMazepine (TEGretol) 100 mg chewable tablet, Chew 1 tablet (100 mg total) 3 (three) times a day, Disp: 90 tablet, Rfl: 0    Cholecalciferol (VITAMIN D) 2000 units CAPS, Take 1 capsule (2,000 Units total) by mouth daily, Disp: 14 capsule, Rfl: 0    digoxin (LANOXIN) 0 125 mg tablet, Take 125 mcg by mouth daily, Disp: , Rfl:     divalproex sodium (DEPAKOTE) 250 mg EC tablet, Take 250 mg by mouth every 12 (twelve) hours , Disp: , Rfl:     ferrous sulfate 325 (65 Fe) mg tablet, Take 1 tablet (325 mg total) by mouth 2 (two) times a day, Disp: 30 tablet, Rfl: 0    furosemide (LASIX) 20 mg tablet, Take 1 tablet (20 mg total) by mouth daily, Disp: , Rfl:     Glucagon, rDNA, (Glucagon Emergency) 1 MG KIT, Inject as directed, Disp: , Rfl:     glucose 40 %, Take 15 g by mouth once, Disp: , Rfl:     levothyroxine 100 mcg tablet, Take 1 tablet (100 mcg total) by mouth daily, Disp: , Rfl: 0    LORazepam (ATIVAN) 0 5 mg tablet, Take 1 tablet (0 5 mg total) by mouth daily at bedtime, Disp: 3 tablet, Rfl: 0    magnesium hydroxide (MILK OF MAGNESIA) 400 mg/5 mL oral suspension, Take by mouth daily as needed for constipation, Disp: , Rfl:     melatonin 3 mg, Take 6 mg by mouth daily at bedtime, Disp: , Rfl:     metoprolol tartrate (LOPRESSOR) 100 mg tablet, Take 1 tablet (100 mg total) by mouth every 12 (twelve) hours, Disp: 180 tablet, Rfl: 3    nitroglycerin (NITROSTAT) 0 4 mg SL tablet, Place 1 tablet (0 4 mg total) under the tongue every 5 (five) minutes as needed for chest pain, Disp: 90 tablet, Rfl: 0    potassium chloride (K-DUR,KLOR-CON) 20 mEq tablet, Take 1 tablet (20 mEq total) by mouth daily, Disp: 15 tablet, Rfl: 0    senna (SENOKOT) 8 6 MG tablet, Take 1 tablet by mouth daily, Disp: , Rfl:     sodium phosphate (PEDIA-LAX) 3 5-9 5 g 59 mL enema, Insert 1 enema into the rectum once, Disp: , Rfl:     PHYSICAL EXAM:  Vitals:    07/09/21 1315   BP: 112/60   BP Location: Left arm   Patient Position: Sitting   Cuff Size: Large   Pulse: (!) 54   Resp: 16   Temp: (!) 97 3 °F (36 3 °C)   TempSrc: Temporal   Weight: 76 6 kg (168 lb 12 8 oz)   Height: 4' 9" (1 448 m)     Body mass index is 36 53 kg/m²  Physical Exam  Constitutional:       General: She is not in acute distress  Comments:  Uses wheelchair   HENT:      Head: Normocephalic and atraumatic  Eyes:      General: No scleral icterus  Conjunctiva/sclera: Conjunctivae normal    Neck:      Vascular: No JVD  Cardiovascular:      Rate and Rhythm: Normal rate  Heart sounds: S1 normal and S2 normal    Pulmonary:      Effort: No accessory muscle usage or respiratory distress  Breath sounds: No wheezing  Abdominal:      General: There is no distension  Palpations: Abdomen is soft  Musculoskeletal:      Cervical back: Neck supple  Right ankle: Swelling present  Left ankle: Swelling present  Comments: Moving all extremities   Skin:     General: Skin is warm  Comments: No Jaundice   Neurological:      Mental Status: She is alert  She is not disoriented  Comments: Facial symmetry  Speech is clear   Psychiatric:         Speech: She is communicative  Behavior: Behavior is not combative           LAB RESULTS:  Results for orders placed or performed during the hospital encounter of 06/18/21   [facility]ovid[facility]    Specimen: Nasopharyngeal Swab; Nares   Result Value Ref Range    SARS-CoV-2 Negative Negative   TSH, 3rd generation   Result Value Ref Range    TSH 3RD GENERATON 36 800 (H) 0 358 - 3 740 uIU/mL   Comprehensive metabolic panel   Result Value Ref Range    Sodium 148 (H) 136 - 145 mmol/L    Potassium 3 9 3 5 - 5 3 mmol/L    Chloride 113 (H) 100 - 108 mmol/L    CO2 33 (H) 21 - 32 mmol/L    ANION GAP 2 (L) 4 - 13 mmol/L    BUN 37 (H) 5 - 25 mg/dL    Creatinine 1 56 (H) 0 60 - 1 30 mg/dL    Glucose 159 (H) 65 - 140 mg/dL    Calcium 9 4 8 3 - 10 1 mg/dL    Corrected Calcium 10 1 8 3 - 10 1 mg/dL    AST 14 5 - 45 U/L    ALT 21 12 - 78 U/L    Alkaline Phosphatase 77 46 - 116 U/L    Total Protein 7 0 6 4 - 8 2 g/dL    Albumin 3 1 (L) 3 5 - 5 0 g/dL    Total Bilirubin 0 32 0 20 - 1 00 mg/dL    eGFR 32 ml/min/1 73sq m   Magnesium   Result Value Ref Range    Magnesium 2 6 1 6 - 2 6 mg/dL   Phosphorus   Result Value Ref Range    Phosphorus 3 9 2 3 - 4 1 mg/dL   CBC and differential   Result Value Ref Range    WBC 9 08 4 31 - 10 16 Thousand/uL    RBC 3 08 (L) 3 81 - 5 12 Million/uL    Hemoglobin 10 4 (L) 11 5 - 15 4 g/dL    Hematocrit 33 0 (L) 34 8 - 46 1 %     (H) 82 - 98 fL    MCH 33 8 26 8 - 34 3 pg    MCHC 31 5 31 4 - 37 4 g/dL    RDW 13 2 11 6 - 15 1 %    MPV 11 6 8 9 - 12 7 fL    Platelets 216 035 - 227 Thousands/uL    nRBC 0 /100 WBCs    Neutrophils Relative 62 43 - 75 %    Immat GRANS % 1 0 - 2 %    Lymphocytes Relative 26 14 - 44 %    Monocytes Relative 9 4 - 12 %    Eosinophils Relative 2 0 - 6 %    Basophils Relative 0 0 - 1 %    Neutrophils Absolute 5 60 1 85 - 7 62 Thousands/µL    Immature Grans Absolute 0 09 0 00 - 0 20 Thousand/uL    Lymphocytes Absolute 2 36 0 60 - 4 47 Thousands/µL    Monocytes Absolute 0 82 0 17 - 1 22 Thousand/µL    Eosinophils Absolute 0 18 0 00 - 0 61 Thousand/µL    Basophils Absolute 0 03 0 00 - 0 10 Thousands/µL   Protime-INR   Result Value Ref Range    Protime 15 1 (H) 11 6 - 14 5 seconds    INR 1 19 0 84 - 1 19   APTT   Result Value Ref Range    PTT 27 23 - 37 seconds   CBC and differential   Result Value Ref Range    WBC 6 24 4 31 - 10 16 Thousand/uL    RBC 2 58 (L) 3 81 - 5 12 Million/uL    Hemoglobin 8 8 (L) 11 5 - 15 4 g/dL    Hematocrit 28 1 (L) 34 8 - 46 1 %     (H) 82 - 98 fL    MCH 34 1 26 8 - 34 3 pg    MCHC 31 3 (L) 31 4 - 37 4 g/dL    RDW 13 3 11 6 - 15 1 %    MPV 11 9 8 9 - 12 7 fL    Platelets 858 205 - 934 Thousands/uL    nRBC 0 /100 WBCs    Neutrophils Relative 51 43 - 75 %    Immat GRANS % 1 0 - 2 %    Lymphocytes Relative 35 14 - 44 %    Monocytes Relative 9 4 - 12 %    Eosinophils Relative 3 0 - 6 %    Basophils Relative 1 0 - 1 %    Neutrophils Absolute 3 21 1 85 - 7 62 Thousands/µL    Immature Grans Absolute 0 05 0 00 - 0 20 Thousand/uL    Lymphocytes Absolute 2 17 0 60 - 4 47 Thousands/µL    Monocytes Absolute 0 57 0 17 - 1 22 Thousand/µL    Eosinophils Absolute 0 21 0 00 - 0 61 Thousand/µL    Basophils Absolute 0 03 0 00 - 0 10 Thousands/µL   Basic metabolic panel   Result Value Ref Range    Sodium 149 (H) 136 - 145 mmol/L    Potassium 4 0 3 5 - 5 3 mmol/L    Chloride 115 (H) 100 - 108 mmol/L    CO2 31 21 - 32 mmol/L    ANION GAP 3 (L) 4 - 13 mmol/L    BUN 33 (H) 5 - 25 mg/dL    Creatinine 1 34 (H) 0 60 - 1 30 mg/dL    Glucose 132 65 - 140 mg/dL    Calcium 8 8 8 3 - 10 1 mg/dL    eGFR 39 ml/min/1 73sq m   CBC and differential   Result Value Ref Range    WBC 5 88 4 31 - 10 16 Thousand/uL    RBC 2 38 (L) 3 81 - 5 12 Million/uL    Hemoglobin 8 3 (L) 11 5 - 15 4 g/dL    Hematocrit 26 3 (L) 34 8 - 46 1 %     (H) 82 - 98 fL    MCH 34 9 (H) 26 8 - 34 3 pg    MCHC 31 6 31 4 - 37 4 g/dL    RDW 13 3 11 6 - 15 1 %    MPV 11 9 8 9 - 12 7 fL    Platelets 179 (L) 635 - 390 Thousands/uL    nRBC 0 /100 WBCs   Basic metabolic panel   Result Value Ref Range    Sodium 152 (H) 136 - 145 mmol/L    Potassium 4 0 3 5 - 5 3 mmol/L    Chloride 119 (H) 100 - 108 mmol/L    CO2 29 21 - 32 mmol/L    ANION GAP 4 4 - 13 mmol/L    BUN 31 (H) 5 - 25 mg/dL    Creatinine 1 39 (H) 0 60 - 1 30 mg/dL    Glucose 127 65 - 140 mg/dL    Calcium 9 0 8 3 - 10 1 mg/dL    eGFR 37 ml/min/1 73sq m   T4, free   Result Value Ref Range    Free T4 0 78 0 76 - 1 46 ng/dL   Sodium   Result Value Ref Range    Sodium 149 (H) 136 - 145 mmol/L   Basic metabolic panel   Result Value Ref Range    Sodium 149 (H) 136 - 145 mmol/L    Potassium 3 9 3 5 - 5 3 mmol/L    Chloride 116 (H) 100 - 108 mmol/L    CO2 31 21 - 32 mmol/L    ANION GAP 2 (L) 4 - 13 mmol/L    BUN 24 5 - 25 mg/dL    Creatinine 1 36 (H) 0 60 - 1 30 mg/dL    Glucose 124 65 - 140 mg/dL    Calcium 8 8 8 3 - 10 1 mg/dL    eGFR 38 ml/min/1 73sq m   CBC and differential   Result Value Ref Range    WBC 6 04 4 31 - 10 16 Thousand/uL    RBC 2 40 (L) 3 81 - 5 12 Million/uL    Hemoglobin 8 0 (L) 11 5 - 15 4 g/dL    Hematocrit 26 0 (L) 34 8 - 46 1 %     (H) 82 - 98 fL    MCH 33 3 26 8 - 34 3 pg    MCHC 30 8 (L) 31 4 - 37 4 g/dL    RDW 13 2 11 6 - 15 1 %    MPV 11 7 8 9 - 12 7 fL    Platelets 146 (L) 956 - 390 Thousands/uL    nRBC 0 /100 WBCs    Neutrophils Relative 36 (L) 43 - 75 %    Immat GRANS % 4 (H) 0 - 2 %    Lymphocytes Relative 45 (H) 14 - 44 %    Monocytes Relative 9 4 - 12 %    Eosinophils Relative 5 0 - 6 %    Basophils Relative 1 0 - 1 %   Basic metabolic panel   Result Value Ref Range    Sodium 145 136 - 145 mmol/L    Potassium 4 2 3 5 - 5 3 mmol/L    Chloride 113 (H) 100 - 108 mmol/L    CO2 28 21 - 32 mmol/L    ANION GAP 4 4 - 13 mmol/L    BUN 20 5 - 25 mg/dL    Creatinine 1 19 0 60 - 1 30 mg/dL    Glucose 138 65 - 140 mg/dL    Calcium 8 8 8 3 - 10 1 mg/dL    eGFR 45 ml/min/1 73sq m   Basic metabolic panel   Result Value Ref Range    Sodium 143 136 - 145 mmol/L    Potassium 3 8 3 5 - 5 3 mmol/L    Chloride 108 100 - 108 mmol/L    CO2 31 21 - 32 mmol/L    ANION GAP 4 4 - 13 mmol/L    BUN 15 5 - 25 mg/dL    Creatinine 1 15 0 60 - 1 30 mg/dL    Glucose 119 65 - 140 mg/dL    Calcium 8 8 8 3 - 10 1 mg/dL    eGFR 47 ml/min/1 73sq m   CBC and Platelet   Result Value Ref Range    WBC 5 60 4 31 - 10 16 Thousand/uL    RBC 2 28 (L) 3 81 - 5 12 Million/uL    Hemoglobin 7 9 (L) 11 5 - 15 4 g/dL    Hematocrit 24 4 (L) 34 8 - 46 1 %     (H) 82 - 98 fL    MCH 34 6 (H) 26 8 - 34 3 pg    MCHC 32 4 31 4 - 37 4 g/dL    RDW 13 5 11 6 - 15 1 %    Platelets 758 125 - 538 Thousands/uL    MPV 11 8 8 9 - 12 7 fL   CBC and Platelet   Result Value Ref Range    WBC 7 25 4 31 - 10 16 Thousand/uL    RBC 2 38 (L) 3 81 - 5 12 Million/uL    Hemoglobin 8 3 (L) 11 5 - 15 4 g/dL    Hematocrit 26 0 (L) 34 8 - 46 1 %     (H) 82 - 98 fL    MCH 34 9 (H) 26 8 - 34 3 pg    MCHC 31 9 31 4 - 37 4 g/dL    RDW 14 2 11 6 - 15 1 %    Platelets 494 (L) 929 - 390 Thousands/uL    MPV 12 2 8 9 - 12 7 fL   Tissue Exam   Result Value Ref Range    Case Report       Surgical Pathology Report                         Case: [id number]                                   Authorizing Provider:  Bryce Ervin MD    Collected:           06/19/2021 [facility]              Ordering Location:     [address] [place] [address]      Received:            06/20/2021 [facility] 6 Operating Room                                                      Pathologist:           Rosemary Martinez MD                                                        Specimen:    Urinary Bladder, bladder biopsy                                                            Final Diagnosis       A  Bladder, biopsy:    -  Portion of high-grade urothelial carcinoma (see note)  -  Muscularis propria is not sampled  Note       The sample shows a portion of urothelial mucosa with high-grade carcinoma with background inflammation  Residual intact architecture is not present  Focal areas of irregular tumor nests are noted raising the possibility of superficial lamina propria invasion, though in the absence of architectural integrity a definitive assessment of invasion cannot be performed in this small sample    Muscularis propria is not sampled in this material   Correlation with clinical and cystoscopic impression is recommended  Intradepartmental consultation was obtained with agreement  Additional Information       All reported additional testing was performed with appropriately reactive controls  These tests were developed and their performance characteristics determined by [address] [place] [address] Specialty Laboratory or appropriate performing facility, though some tests may be performed on tissues which have not been validated for performance characteristics (such as staining performed on alcohol exposed cell blocks and decalcified tissues)  Results should be interpreted with caution and in the context of the patients clinical condition  These tests may not be cleared or approved by the [place]  Food and Drug Administration, though the FDA has determined that such clearance or approval is not necessary  These tests are used for clinical purposes and they should not be regarded as investigational or for research  This laboratory has been approved by [place] [address], designated as a high-complexity laboratory and is qualified to perform these tests  Interpretation performed at [address], [address] [place] [address]        Gross Description          A  The specimen is received in formalin, labeled with the patient's name and hospital number, and is designated "bladder biopsy  The specimen consists of 2 white-tan soft tissue fragments each measuring 0 3 cm  Entirely submitted  Screened cassette  Note: The estimated total formalin fixation time based upon information provided by the submitting clinician and the standard processing schedule is under 72 hours      MCrites          Tissue Exam   Result Value Ref Range    Case Report       Surgical Pathology Report                         Case: [id number]                                   Authorizing Provider:  Reyes Rios MD    Collected:           06/23/2021 [id number]              Ordering Location: [facility]      Received:            06/23/2021 [facility] Room                                                      Pathologist:           Radha Finn MD                                                        Specimens:   A) - Urinary Bladder, Bladder tumor Anterolateral wall                                              B) - Urinary Bladder, Bladder tumor deep resection                                         Final Diagnosis       A  Urinary Bladder, Bladder tumor anterolateral wall:  - Invasive high-grade urothelial carcinoma  - Tumor invades lamina propria and muscularis propria  B  Urinary Bladder, Bladder tumor deep resection:  - Invasive high-grade urothelial carcinoma  - Tumor invades lamina propria and muscularis propria  Additional Information       All reported additional testing was performed with appropriately reactive controls  These tests were developed and their performance characteristics determined by [address] [place] [address] Specialty Laboratory or appropriate performing facility, though some tests may be performed on tissues which have not been validated for performance characteristics (such as staining performed on alcohol exposed cell blocks and decalcified tissues)  Results should be interpreted with caution and in the context of the patients clinical condition  These tests may not be cleared or approved by the [place]  Food and Drug Administration, though the FDA has determined that such clearance or approval is not necessary  These tests are used for clinical purposes and they should not be regarded as investigational or for research  This laboratory has been approved by CLIA 88, designated as a high-complexity laboratory and is qualified to perform these tests  Interpretation performed at [facility], [address] [place] [id number]      Gross Description          A   The specimen is received in formalin, labeled with the patient's name and hospital number, and is designated "bladder tumor anterior lateral wall  The specimen consists of multiple tan-pink rubbery and friable soft tissue fragments measuring in aggregate 3 x 2 x 0 4 cm  Entirely submitted  Two cassettes  In embedding bags  B  The specimen is received in formalin, labeled with the patient's name and hospital number, and is designated "bladder tumor deep resection  The specimen consists of multiple tan-pink rubbery and friable soft tissue fragments measuring in aggregate 3 8 x 2 3 x 0 7 cm  Entirely submitted  Three cassettes  In embedding bags  Note: The estimated total formalin fixation time based upon information provided by the submitting clinician and the standard processing schedule is under 72 hours  MCrites         Manual Differential(PHLEBS Do Not Order)   Result Value Ref Range    Segmented % 49 43 - 75 %    Lymphocytes % 37 14 - 44 %    Monocytes % 4 4 - 12 %    Eosinophils, % 7 (H) 0 - 6 %    Basophils % 2 (H) 0 - 1 %    Myelocytes % 1 0 - 1 %    Absolute Neutrophils 2 88 1 85 - 7 62 Thousand/uL    Lymphocytes Absolute 2 18 0 60 - 4 47 Thousand/uL    Monocytes Absolute 0 24 0 00 - 1 22 Thousand/uL    Eosinophils Absolute 0 41 (H) 0 00 - 0 40 Thousand/uL    Basophils Absolute 0 12 (H) 0 00 - 0 10 Thousand/uL    Total Counted      RBC Morphology Normal     Platelet Estimate Decreased (A) Adequate       ASSESSMENT and PLAN:  [facility] was seen today for chronic kidney disease and follow-up  Diagnoses and all orders for this visit:    Stage 3b chronic kidney disease ([facility] )  -     CBC and differential; Future  -     Basic metabolic panel; Future  -     Urinalysis with microscopic; Future  -     Microalbumin / creatinine urine ratio; Future  -     Phosphorus; Future  -     Uric acid; Future  -     PTH, intact; Future  -     Vitamin D 25 hydroxy;  Future    Hypertensive kidney disease with stage 3b chronic kidney disease ([facility] )  -     Basic metabolic panel; Future  -     Urinalysis with microscopic; Future  -     Microalbumin / creatinine urine ratio; Future    Vitamin D deficiency  -     Vitamin D 25 hydroxy; Future      1  CKD stage 3   Multifactorial, suspected due to underlying hypertensive 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 )   Upon review of old medical records from [address] [place], [place] [address] chart review, previously known baseline creatinine seems to be around 1 3-1 5 with current creatinine of 1 36 with EGFR of 38   Patient was previously admitted with gross hematuria and was also found to have urinary bladder cancer and had underwent trans urethral resection of bladder tumor on 6/23/2021  Patient was recently seen by urologist in office on 7/6/2021 and patient underlying cancer is cT2 stage and has been referred to oncologist for further evaluation   Patient also currently have indwelling Davis catheter   Management of hypertension as mentioned below   Clinically breathing was stable and patient is not requiring nasal oxygen  Patient is also using Lasix 20 mg PO daily and has some leg swelling   Discussed in length with patient and daughter today that dehydration can decrease renal function in the future and would like to make sure patient is drinking around 2 L of fluid on daily basis to ensure staying well hydrated   Plan to avoid NSAIDs and recheck renal function before next visit   2  Hypertension in chronic kidney disease  Currently blood pressure is under well control and monitor hypertension with metoprolol 100 mg PO BID   Advised to follow low-salt diet   3  Vitamin- D deficiency   Current vitamin-D level is 29  Currently patient is taking cholecalciferol 2000 Units PO daily   Recent calcium level is at goal   Plan to continue cholecalciferol at current dose and recheck vitamin-D level with the next visit   Returns to Nephrology office in 4 months    Future labs ordered        Portions of the record may have been created with voice recognition software  Occasional wrong word or "sound a like" substitutions may have occurred due to the inherent limitations of voice recognition software  Read the chart carefully and recognize, using context, where substitutions have occurred  If you have any questions, please contact the dictating provider

## 2021-07-12 ENCOUNTER — RADIATION ONCOLOGY CONSULT (OUTPATIENT)
Dept: RADIATION ONCOLOGY | Facility: CLINIC | Age: 76
End: 2021-07-12
Attending: RADIOLOGY
Payer: MEDICARE

## 2021-07-12 ENCOUNTER — CLINICAL SUPPORT (OUTPATIENT)
Dept: RADIATION ONCOLOGY | Facility: CLINIC | Age: 76
End: 2021-07-12
Attending: RADIOLOGY

## 2021-07-12 VITALS
HEART RATE: 55 BPM | HEIGHT: 57 IN | TEMPERATURE: 98 F | BODY MASS INDEX: 36.24 KG/M2 | DIASTOLIC BLOOD PRESSURE: 57 MMHG | RESPIRATION RATE: 18 BRPM | SYSTOLIC BLOOD PRESSURE: 117 MMHG | WEIGHT: 168 LBS

## 2021-07-12 DIAGNOSIS — C67.2 MALIGNANT NEOPLASM OF LATERAL WALL OF URINARY BLADDER (HCC): Primary | ICD-10-CM

## 2021-07-12 DIAGNOSIS — C67.2 MALIGNANT NEOPLASM OF LATERAL WALL OF URINARY BLADDER (HCC): ICD-10-CM

## 2021-07-12 PROCEDURE — 99204 OFFICE O/P NEW MOD 45 MIN: CPT | Performed by: RADIOLOGY

## 2021-07-12 NOTE — PROGRESS NOTES
Consultation - Radiation Oncology      APC:[id number] : 1945  Encounter: [id number]  Patient Information: [address]  Chief Complaint   Patient presents with    Consult     Radiation Oncology     Cancer Staging  No matching staging information was found for the patient  History of Present Illness   Aldo Vasques is a 77y o  year old female who presents with Aldo Vasques 1945 is a 77 y o  female     Solitary kidney with chronic kidney disease; Alzheimer's     Admitted - [facility]   transfer from [facility] for urologic evaluation for gross hematuria in her chronic Davis      21 CT abdomen and pelvis  High density components within the bladder lumen in keeping with intraluminal bladder hemorrhage      Mild circumferential bladder wall thickening and surrounding fat stranding could indicate cystitis or could be reactive to the hemorrhage         21 Cysto with evacuation of clots  POST-OP DIAGNOSES AND OPERATIVE FINDINGS:   1) high grade urothelial carcinoma the bladder - 7 cm bladder tumor  2  Gross hematuria  3  Neurogenic bladder, chronic indwelling Davis catheter       Bladder, biopsy:    -  Portion of high-grade urothelial carcinoma      21 TURBT    T2/T3 - intraoperative findings are clearly consistent with muscle invasive disease     A  Urinary Bladder, Bladder tumor anterolateral wall:  - Invasive high-grade urothelial carcinoma  - Tumor invades lamina propria and muscularis propria       B  Urinary Bladder, Bladder tumor deep resection:  - Invasive high-grade urothelial carcinoma  - Tumor invades lamina propria and muscularis propria     21 CT chest, abdomen and pelvis  IMPRESSION:  1  No definitive evidence for metastatic disease        2   Numerous small pulmonary nodules appear stable, likely benign, possibly sequela of previous granulomatous disease      3  Limited evaluation of the urinary bladder, decompressed by Davis catheter with nonspecific perivesical stranding and fluid   No dominant bladder mass seen within limitations      4   Mild circumferential rectal wall thickening could be accentuated by underdistention   Correlate with physical exam        5  Cholelithiasis      7/6/21 Zoya Hoover MD  "poor candidate for radical cysto cystectomy so  discussed pelvic radiation for local control along with options for systemic treatment  I believe the patient would be best served with you if these 2 modalities and the patient's daughter agrees  We discussed that systemic treatment could be offered either for chemo sensitization or potentially immunotherapy could be utilized      Patient's daughter who serves as her power of , remains unclear if they would elect to pursue any definitive treatment"  Palliative care referral, Medical Oncology and Radiation Oncology consults placed     7/23/21 Mel Wright MD                  Historical Information   Oncology History   Malignant neoplasm of lateral wall of urinary bladder ([facility] )   6/19/2021 Biopsy     Bladder, biopsy:    -  Portion of high-grade urothelial carcinoma      6/23/2021 Surgery    A  Urinary Bladder, Bladder tumor anterolateral wall:  - Invasive high-grade urothelial carcinoma  - Tumor invades lamina propria and muscularis propria  B  Urinary Bladder, Bladder tumor deep resection:  - Invasive high-grade urothelial carcinoma  - Tumor invades lamina propria and muscularis propria        7/6/2021 Initial Diagnosis    Malignant neoplasm of lateral wall of urinary bladder (HCC)           Past Medical History:   Diagnosis Date    Atrial fibrillation ([facility] )     Brain aneurysm     CAD (coronary artery disease)     Cardiac disease     had st[facility] 12 years [facility] in [facility]    Chronic kidney disease     CKD (chronic kidney disease) stage 3, GFR 30-59 ml/min (HCC) ckd    COPD (chronic obstructive pulmonary disease) (HCC)     Dementia ([facility] )     Diastolic CHF, chronic (HCC)     GERD (gastroesophageal reflux disease)     Hyperlipidemia     Hyperlipidemia     Hypertension     Hypothyroidism     Migraine     PAD (peripheral artery disease) (HCC)     Renal disorder     Seizures ([address] )     Stroke [facility])      Past Surgical History:   Procedure Laterality Date    APPENDECTOMY      ARTERIOGRAM N/A 2018    Procedure: ARTERIOGRAM WITH STENT PLACEMENT;  Surgeon: Ban Reddy MD;  [facility];  Service: Vascular    BLADDER TUMOR EXCISION     [address]  clip right frontal lobe    CARDIAC SURGERY      COLONOSCOPY      CORONARY STENT PLACEMENT      5 stents    EYE SURGERY      IR AORTAGRAM WITH RUN-OFF  2018    MANDIBLE FRACTURE SURGERY      TN CYSTOURETHROSCOPY W/IRRIG & EVAC CLOTS N/A 2021    Procedure: CYSTOSCOPY EVACUATION OF CLOTS;  Surgeon: Jann Tong MD;  [facility];  Service: Urology    TONSILLECTOMY      TRANSURETHRAL RESECTION OF BLADDER TUMOR N/A 2021    Procedure: TRANSURETHRAL RESECTION OF BLADDER TUMOR (TURBT);   Surgeon: Jann Tong MD;  [facility];  Service: Urology    TUBAL LIGATION      UTERINE FIBROID SURGERY         Family History   Problem Relation Age of Onset    Heart disease Father     Parkinsonism Father     Macular degeneration Mother     Glaucoma Mother     Diabetes Brother     Heart disease Brother        Social History   Social History     Substance and Sexual Activity   Alcohol Use Not Currently    Alcohol/week: 0 0 standard drinks     Social History     Substance and Sexual Activity   Drug Use No     Social History     Tobacco Use   Smoking Status Former Smoker    Packs/day: 5 00    Years: 40 00    Pack years: 200 00    Quit date: 2007    Years since quittin 8   Smokeless Tobacco Never Used         Meds/Allergies     Current Outpatient Medications:     acetaminophen (TYLENOL) 325 mg tablet, Take 650 mg by mouth every 6 (six) hours as needed for mild pain, Disp: , Rfl:     albuterol (PROVENTIL HFA,VENTOLIN HFA) 90 mcg/act inhaler, Inhale 2 puffs every 6 (six) hours as needed for wheezing, Disp: 1 Inhaler, Rfl: 0    bisacodyl (FLEET) 10 MG/30ML ENEM, Insert 10 mg into the rectum as needed for constipation, Disp: , Rfl:     buPROPion (WELLBUTRIN SR) 150 mg 12 hr tablet, Take 1 tablet (150 mg total) by mouth daily, Disp: 30 tablet, Rfl: 0    carBAMazepine (TEGretol) 100 mg chewable tablet, Chew 1 tablet (100 mg total) 3 (three) times a day, Disp: 90 tablet, Rfl: 0    Cholecalciferol (VITAMIN D) 2000 units CAPS, Take 1 capsule (2,000 Units total) by mouth daily, Disp: 14 capsule, Rfl: 0    digoxin (LANOXIN) 0 125 mg tablet, Take 125 mcg by mouth daily, Disp: , Rfl:     divalproex sodium (DEPAKOTE) 250 mg EC tablet, Take 250 mg by mouth every 12 (twelve) hours , Disp: , Rfl:     ferrous sulfate 325 (65 Fe) mg tablet, Take 1 tablet (325 mg total) by mouth 2 (two) times a day, Disp: 30 tablet, Rfl: 0    furosemide (LASIX) 20 mg tablet, Take 1 tablet (20 mg total) by mouth daily, Disp: , Rfl:     Glucagon, rDNA, (Glucagon Emergency) 1 MG KIT, Inject as directed, Disp: , Rfl:     glucose 40 %, Take 15 g by mouth once, Disp: , Rfl:     levothyroxine 100 mcg tablet, Take 1 tablet (100 mcg total) by mouth daily, Disp: , Rfl: 0    LORazepam (ATIVAN) 0 5 mg tablet, Take 1 tablet (0 5 mg total) by mouth daily at bedtime, Disp: 3 tablet, Rfl: 0    magnesium hydroxide (MILK OF MAGNESIA) 400 mg/5 mL oral suspension, Take by mouth daily as needed for constipation, Disp: , Rfl:     melatonin 3 mg, Take 6 mg by mouth daily at bedtime, Disp: , Rfl:     metoprolol tartrate (LOPRESSOR) 100 mg tablet, Take 1 tablet (100 mg total) by mouth every 12 (twelve) hours, Disp: 180 tablet, Rfl: 3    nitroglycerin (NITROSTAT) 0 4 mg SL tablet, Place 1 tablet (0 4 mg total) under the tongue every 5 (five) minutes as needed for chest pain, Disp: 90 tablet, Rfl: 0    potassium chloride (K-DUR,KLOR-CON) 20 mEq tablet, Take 1 tablet (20 mEq total) by mouth daily, Disp: 15 tablet, Rfl: 0    senna (SENOKOT) 8 6 MG tablet, Take 1 tablet by mouth daily, Disp: , Rfl:     sodium phosphate (PEDIA-LAX) 3 5-9 5 g 59 mL enema, Insert 1 enema into the rectum once, Disp: , Rfl:   Allergies   Allergen Reactions    Dye [Iodinated Diagnostic Agents] Other (See Comments)     allergy    Spiriva Handihaler [Tiotropium Bromide Monohydrate] Swelling    Ramipril Facial Swelling    Penicillins      unsure         Review of Systems   Constitutional: Positive for fatigue  Negative for activity change, appetite change, fever and unexpected weight change  HENT: Negative for congestion, sneezing and sore throat  Eyes: Negative  Respiratory: Negative for cough and shortness of breath  Cardiovascular: Negative for chest pain, palpitations and leg swelling  Gastrointestinal: Negative for abdominal pain, blood in stool, nausea, rectal pain and vomiting  Endocrine: Negative  Genitourinary: Positive for difficulty urinating, frequency and hematuria  Negative for vaginal bleeding  Musculoskeletal: Negative for back pain, joint swelling and myalgias  Skin: Negative  Allergic/Immunologic: Negative  Neurological: Negative for dizziness, weakness, numbness and headaches  Hematological: Negative  Psychiatric/Behavioral: Negative  OBJECTIVE:   /57 (BP Location: Left arm, Patient Position: Sitting)   Pulse 55   Temp 98 °F (36 7 °C) (Temporal)   Resp 18   Ht 4' 9" (1 448 m)   Wt 76 2 kg (168 lb)   BMI 36 35 kg/m²   Pain Assessment:  1  Performance Status: Karnofsky: 60 - Requires occasional assistance, but is able care for most of personal needs    Physical Exam  Constitutional:       General: She is not in acute distress  Appearance: Normal appearance  HENT:      Nose: No congestion        Mouth/Throat:      Pharynx: Oropharynx is clear  Eyes:      Extraocular Movements: Extraocular movements intact  Pupils: Pupils are equal, round, and reactive to light  Cardiovascular:      Rate and Rhythm: Normal rate and regular rhythm  Heart sounds: Normal heart sounds  Pulmonary:      Effort: Pulmonary effort is normal       Breath sounds: Normal breath sounds  Abdominal:      Palpations: Abdomen is soft  There is no mass  Tenderness: There is no abdominal tenderness  Genitourinary:     Comments: Has chronic indwelling Baca catheter at least 10 years  Musculoskeletal:         General: No swelling or tenderness  Normal range of motion  Cervical back: Normal range of motion and neck supple  Lymphadenopathy:      Cervical: No cervical adenopathy  Skin:     General: Skin is warm  Findings: No lesion or rash  Neurological:      General: No focal deficit present  Mental Status: She is alert  She is disoriented  Psychiatric:         Mood and Affect: Mood normal          Behavior: Behavior normal             RESULTS  Lab Results    Chemistry        Component Value Date/Time    K 3 8 06/24/2021 0630     06/24/2021 0630    CO2 31 06/24/2021 0630    BUN 15 06/24/2021 0630    CREATININE 1 15 06/24/2021 0630        Component Value Date/Time    CALCIUM 8 8 06/24/2021 0630    ALKPHOS 77 06/19/2021 0738    AST 14 06/19/2021 0738    ALT 21 06/19/2021 0738            Lab Results   Component Value Date    WBC 7 25 06/25/2021    HGB 8 3 (L) 06/25/2021    HCT 26 0 (L) 06/25/2021     (H) 06/25/2021     (L) 06/25/2021         Imaging Studies  CT abdomen pelvis wo contrast    Result Date: 6/18/2021  Narrative: CT ABDOMEN AND PELVIS WITHOUT IV CONTRAST INDICATION:   blood in baca catheter  "pt presents via EMS from [place] for c/o blood in her baca for the past 2 days   Pt denies any other c/o arrival"  "76 yo F with dementia, chronic baca catheter presents with blood in baca bag for the past two days  Patient declines any pain or any other complaints" COMPARISON:  CT chest abdomen pelvis 6/1/2021 TECHNIQUE:  CT examination of the abdomen and pelvis was performed without intravenous contrast   Axial, sagittal, and coronal 2D reformatted images were created from the source data and submitted for interpretation  Radiation dose length product (DLP) for this visit:  631 mGy-cm   This examination, like all CT scans performed in the [facility], was performed utilizing techniques to minimize radiation dose exposure, including the use of iterative reconstruction and automated exposure control  Enteric contrast was administered  FINDINGS: ABDOMEN LOWER CHEST:  A 16 x 13 mm left lower lobe lobulated opacity is partially imaged and unchanged from the prior CT chest  Cardiomegaly  LIVER/BILIARY TREE:  Unremarkable  GALLBLADDER:  There are gallstone(s) within the gallbladder, without pericholecystic inflammatory changes  SPLEEN:  Unremarkable  PANCREAS:  Unremarkable  ADRENAL GLANDS:  Unremarkable  KIDNEYS/URETERS:  Congenitally absent right kidney  No left hydronephrosis  Moderate left renal vascular calcifications  No ureteral calculi  STOMACH AND BOWEL:  Prominent colonic stool  No bowel obstruction  APPENDIX:  No findings to suggest appendicitis  ABDOMINOPELVIC CAVITY:  No ascites  No pneumoperitoneum  No lymphadenopathy  VESSELS:  Unremarkable for patient's age  PELVIS REPRODUCTIVE ORGANS:  Unremarkable for patient's age  URINARY BLADDER:  High density components within the bladder lumen in keeping with intraluminal bladder hemorrhage  Mild circumferential bladder wall thickening and surrounding fat stranding could indicate cystitis or could be reactive to the hemorrhage  A Davis catheter is present  ABDOMINAL WALL/INGUINAL REGIONS:  Unchanged right para midline, fat-containing, anterior pelvic wall hernia unchanged in size  OSSEOUS STRUCTURES:  No acute fracture or destructive osseous lesion  Impression: High density components within the bladder lumen in keeping with intraluminal bladder hemorrhage  Mild circumferential bladder wall thickening and surrounding fat stranding could indicate cystitis or could be reactive to the hemorrhage  The study was marked in [facility] for immediate notification  Workstation performed: [id number]     XR chest portable    Result Date: 6/23/2021  Narrative: CHEST INDICATION:   Abnormal lung exam history of CHF  COMPARISON:  6/1/2021 EXAM PERFORMED/VIEWS:  XR CHEST PORTABLE Single view FINDINGS: Cardiomediastinal silhouette appears unremarkable  The lungs are clear  No pneumothorax or pleural effusion  Osseous structures appear within normal limits for patient age  Impression: No acute cardiopulmonary disease  Findings are stable Workstation performed: [id number]     CT chest abdomen pelvis w contrast    Result Date: 6/23/2021  Narrative: CT CHEST, ABDOMEN AND PELVIS WITH IV CONTRAST INDICATION:  Staging for invasive bladder cancer  COMPARISON:  CT abdomen and pelvis 6/18/2021, CT chest, abdomen and pelvis 6/1/2021 and CTA chest, abdomen and pelvis 9/7/2018 and CT chest 12/10/2017 TECHNIQUE: CT examination of the chest, abdomen and pelvis was performed  Axial, sagittal, and coronal 2D reformatted images were created from the source data and submitted for interpretation  Radiation dose length product (DLP) for this visit:  1178 53 mGy-cm  This examination, like all CT scans performed in the [facility], was performed utilizing techniques to minimize radiation dose exposure, including the use of iterative  reconstruction and automated exposure control  IV Contrast:  100 mL of iohexol (OMNIPAQUE) Enteric Contrast: Enteric contrast was not administered   FINDINGS: CHEST LUNGS:  Multiple pulmonary nodules are again identified which will be detailed on series 3; -Previously described 6 mm right middle lobe nodule (formerly series 3, image 81) appears less conspicuous currently measuring about 4 mm image 64  -4 mm nodule right middle lobe, image 57, stable  -3 mm right middle lobe nodule image 53, stable  -3 mm left apical nodule image 17, stable  -2 mm anterior lingular nodule image 51, probably measured 1 mm on the 2017 study  -3 mm posterior left lower lobe superior segment nodule, stable  -Linear/tubular opacity left lower lobe abutting a posterior vessel measuring 1 4 x 0 4 cm similar to study September 2018  -4 mm left lower lobe nodule image 75, probably stable  -A few additional nodules are noted, similar  Minor bibasilar subsegmental atelectasis  Scattered calcified granulomas in both lungs  No endobronchial lesions  PLEURA:  Unremarkable  HEART/GREAT VESSELS:  The heart is top normal to borderline enlarged  Tiny pericardial effusion  Atherosclerotic changes thoracic aorta and coronary arteries  MEDIASTINUM AND ANNE:  Unremarkable  CHEST WALL AND LOWER NECK:   Unremarkable  ABDOMEN LIVER/BILIARY TREE:  No focal hepatic lesions  CBD measures up to 7 mm which may be upper normal for patient age  GALLBLADDER:  There are gallstone(s) within the gallbladder, without pericholecystic inflammatory changes  SPLEEN:  Unremarkable  PANCREAS:  Mild fatty replacement of the pancreas without acute findings  ADRENAL GLANDS:  Unremarkable  KIDNEYS/URETERS:  Absent right kidney  Cortical scarring left kidney with extrarenal pelvis and hilar vascular calcifications noted  STOMACH AND BOWEL:  Mild circumferential rectal wall thickening could be accentuated by underdistention  No enlarged perirectal adenopathy  APPENDIX:  No findings to suggest appendicitis  ABDOMINOPELVIC CAVITY:  No pneumoperitoneum  No enlarged lymphadenopathy  Trace presacral fluid  VESSELS: Atherosclerotic changes abdominal aorta without evidence of aneurysm  PELVIS REPRODUCTIVE ORGANS:  Unremarkable for patient's age  URINARY BLADDER:  There is bladder is limited by Davis catheter decompression  Intraluminal air noted  There is perivesical stranding and/or fluid which is nonspecific  No dominant bladder mass seen  ABDOMINAL WALL/INGUINAL REGIONS:  Modest sized right paramedian fat-containing ventral abdominal wall hernia  OSSEOUS STRUCTURES:  No acute fracture or destructive osseous lesion  Motion artifact right proximal humerus  Degenerative changes of the spine  Hemangiomas T4, T6 and L2 vertebrae  Impression: 1  No definitive evidence for metastatic disease  2  Numerous small pulmonary nodules appear stable, likely benign, possibly sequela of previous granulomatous disease  3  Limited evaluation of the urinary bladder, decompressed by Davis catheter with nonspecific perivesical stranding and fluid  No dominant bladder mass seen within limitations  4   Mild circumferential rectal wall thickening could be accentuated by underdistention  Correlate with physical exam   5  Cholelithiasis  Additional incidental findings as above  Workstation performed: [id number]         Pathology:  High-grade muscle invading urothelial carcinoma of the bladder        ASSESSMENT  1  Malignant neoplasm of lateral wall of urinary bladder [facility])  Ambulatory referral to Radiation Oncology     Cancer Staging  No matching staging information was found for the patient  PLAN/DISCUSSION  No orders of the defined types were placed in this encounter  Quinn Edwards is a 77y o  year old female with history of gross nocturia and cystoscopy demonstrated a large 7-8 cm tumor underwent resection pathology report confirmed muscle invading high-grade urothelial carcinoma  CT of chest, abdomen and pelvis showed no regional or distant metastases  There are pulmonary nodules which appear to be benign related to granulomatous disease  She has history of dementia at least 20 years following CVA from aneurysm but without motor or sensory deficit    Only recently according to the daughter she has shown signs of early Alzheimer's disease  Family wants to know what is best for her and daughter who has power of  whether any of the treatments will be beneficial   They have an appointment with medical oncology next week  We discuss goals of treatment and treatment course of 5-6 weeks and the benefit is to prevent additional urinary bleeding and prevent disease from metastases  We reviewed side effects of loose bowel, frequency or burning urination particularly she has an indwelling Davis catheter, fatigue and cystitis symptoms  It will be difficult for her to have concurrent chemoradiation therapy probably should be done in sequence or radiation therapy alone to dose of 54 to 60 Gy  Patient's daughter will call us to set up appointment for for CT simulation treatment planning should they decide for treatments  Breanne Lee MD  7/12/2021,3:09 PM      Portions of the record may have been created with voice recognition software  Occasional wrong word or "sound a like" substitutions may have occurred due to the inherent limitations of voice recognition software  Read the chart carefully and recognize, using context, where substitutions have occurred

## 2021-07-12 NOTE — PROGRESS NOTES
Dee Castelan 1945 is a 77 y o  female    Solitary kidney with chronic kidney disease; Alzheimer's    Admitted 6/18-6/25 [facility]   transfer from [facility] for urologic evaluation for gross hematuria in her chronic Davis  6/18/21 CT abdomen and pelvis  High density components within the bladder lumen in keeping with intraluminal bladder hemorrhage  Mild circumferential bladder wall thickening and surrounding fat stranding could indicate cystitis or could be reactive to the hemorrhage  6/19/21 Cysto with evacuation of clots  POST-OP DIAGNOSES AND OPERATIVE FINDINGS:   1) high grade urothelial carcinoma the bladder - 7 cm bladder tumor  2  Gross hematuria  3  Neurogenic bladder, chronic indwelling Davis catheter      Bladder, biopsy:    -  Portion of high-grade urothelial carcinoma     6/23/21 TURBT    T2/T3 - intraoperative findings are clearly consistent with muscle invasive disease    A  Urinary Bladder, Bladder tumor anterolateral wall:  - Invasive high-grade urothelial carcinoma  - Tumor invades lamina propria and muscularis propria  B  Urinary Bladder, Bladder tumor deep resection:  - Invasive high-grade urothelial carcinoma  - Tumor invades lamina propria and muscularis propria    6/23/21 CT chest, abdomen and pelvis  IMPRESSION:  1  No definitive evidence for metastatic disease  2  Numerous small pulmonary nodules appear stable, likely benign, possibly sequela of previous granulomatous disease  3  Limited evaluation of the urinary bladder, decompressed by Davis catheter with nonspecific perivesical stranding and fluid  No dominant bladder mass seen within limitations  4   Mild circumferential rectal wall thickening could be accentuated by underdistention  Correlate with physical exam        5  Cholelithiasis      7/6/21 Kaylen Cardona MD  "poor candidate for radical cysto cystectomy so  discussed pelvic radiation for local control along with options for systemic treatment  I believe the patient would be best served with you if these 2 modalities and the patient's daughter agrees  We discussed that systemic treatment could be offered either for chemo sensitization or potentially immunotherapy could be utilized  Patient's daughter who serves as her power of , remains unclear if they would elect to pursue any definitive treatment"  Palliative care referral, Medical Oncology and Radiation Oncology consults placed    7/23/21 Ant Sims MD    Oncology History   Malignant neoplasm of lateral wall of urinary bladder ([facility] [address] )   6/19/2021 Biopsy     Bladder, biopsy:    -  Portion of high-grade urothelial carcinoma      6/23/2021 Surgery    A  Urinary Bladder, Bladder tumor anterolateral wall:  - Invasive high-grade urothelial carcinoma  - Tumor invades lamina propria and muscularis propria  B  Urinary Bladder, Bladder tumor deep resection:  - Invasive high-grade urothelial carcinoma  - Tumor invades lamina propria and muscularis propria        7/6/2021 Initial Diagnosis    Malignant neoplasm of lateral wall of urinary bladder (HCC)         Clinical Trial: no      Health Maintenance   Topic Date Due    Medicare Annual Wellness Visit (AWV)  Never done    Depression Screening PHQ  Never done    BMI: Followup Plan  Never done    Lung Cancer Screening  Never done    Colorectal Cancer Screening  Never done    Fall Risk  Never done    Influenza Vaccine (1) 09/01/2021    BMI: Adult  07/09/2022    DTaP,Tdap,and Td Vaccines (2 - Td or Tdap) 08/06/2022    Hepatitis C Screening  Completed    Pneumococcal Vaccine: 65+ Years  Completed    COVID-19 Vaccine  Completed    HIB Vaccine  Aged Out    Hepatitis B Vaccine  Aged Out    IPV Vaccine  Aged Out    Hepatitis A Vaccine  Aged Out    Meningococcal ACWY Vaccine  Aged Out    HPV Vaccine  Aged Out       Past Medical History:   Diagnosis Date    Atrial fibrillation ([facility] [address] )     Brain aneurysm     CAD (coronary artery disease)     Cardiac disease     had stents 12 years ago in [facility]    Chronic kidney disease     CKD (chronic kidney disease) stage 3, GFR 30-59 ml/min (HCC) ckd    COPD (chronic obstructive pulmonary disease) (HCC)     Dementia (HCC)     Diastolic CHF, chronic (HCC)     GERD (gastroesophageal reflux disease)     Hyperlipidemia     Hyperlipidemia     Hypertension     Hypothyroidism     Migraine     PAD (peripheral artery disease) (HCC)     Renal disorder     Seizures ([address] )     Stroke [facility])        Past Surgical History:   Procedure Laterality Date    APPENDECTOMY      ARTERIOGRAM N/A 2018    Procedure: ARTERIOGRAM WITH STENT PLACEMENT;  Surgeon: Beverly Reddy MD;  [facility];  Service: Vascular    BLADDER TUMOR EXCISION     [id number]  clip right frontal lobe    CARDIAC SURGERY      COLONOSCOPY      CORONARY STENT PLACEMENT      5 stents    EYE SURGERY      IR AORTAGRAM WITH RUN-OFF  2018    MANDIBLE FRACTURE SURGERY      MO CYSTOURETHROSCOPY W/IRRIG & EVAC CLOTS N/A 2021    Procedure: CYSTOSCOPY EVACUATION OF CLOTS;  Surgeon: Alanna Moore MD;  [facility];  Service: Urology    TONSILLECTOMY      TRANSURETHRAL RESECTION OF BLADDER TUMOR N/A 2021    Procedure: TRANSURETHRAL RESECTION OF BLADDER TUMOR (TURBT);   Surgeon: Alanna Moore MD;  [facility];  Service: Urology    TUBAL LIGATION      UTERINE FIBROID SURGERY         Family History   Problem Relation Age of Onset    Heart disease Father     Parkinsonism Father     Macular degeneration Mother     Glaucoma Mother     Diabetes Brother     Heart disease Brother        Social History     Tobacco Use    Smoking status: Former Smoker     Packs/day: 5 00     Years: 40 00     Pack years: 200 00     Quit date: 2007     Years since quittin 8    Smokeless tobacco: Never Used   Vaping Use    Vaping Use: Never used Substance Use Topics    Alcohol use: Not Currently     Alcohol/week: 0 0 standard drinks    Drug use: No          Current Outpatient Medications:     acetaminophen (TYLENOL) 325 mg tablet, Take 650 mg by mouth every 6 (six) hours as needed for mild pain, Disp: , Rfl:     albuterol (PROVENTIL HFA,VENTOLIN HFA) 90 mcg/act inhaler, Inhale 2 puffs every 6 (six) hours as needed for wheezing, Disp: 1 Inhaler, Rfl: 0    bisacodyl (FLEET) 10 MG/30ML ENEM, Insert 10 mg into the rectum as needed for constipation, Disp: , Rfl:     buPROPion (WELLBUTRIN SR) 150 mg 12 hr tablet, Take 1 tablet (150 mg total) by mouth daily, Disp: 30 tablet, Rfl: 0    carBAMazepine (TEGretol) 100 mg chewable tablet, Chew 1 tablet (100 mg total) 3 (three) times a day, Disp: 90 tablet, Rfl: 0    Cholecalciferol (VITAMIN D) 2000 units CAPS, Take 1 capsule (2,000 Units total) by mouth daily, Disp: 14 capsule, Rfl: 0    digoxin (LANOXIN) 0 125 mg tablet, Take 125 mcg by mouth daily, Disp: , Rfl:     divalproex sodium (DEPAKOTE) 250 mg EC tablet, Take 250 mg by mouth every 12 (twelve) hours , Disp: , Rfl:     ferrous sulfate 325 (65 Fe) mg tablet, Take 1 tablet (325 mg total) by mouth 2 (two) times a day, Disp: 30 tablet, Rfl: 0    furosemide (LASIX) 20 mg tablet, Take 1 tablet (20 mg total) by mouth daily, Disp: , Rfl:     Glucagon, rDNA, (Glucagon Emergency) 1 MG KIT, Inject as directed, Disp: , Rfl:     glucose 40 %, Take 15 g by mouth once, Disp: , Rfl:     levothyroxine 100 mcg tablet, Take 1 tablet (100 mcg total) by mouth daily, Disp: , Rfl: 0    LORazepam (ATIVAN) 0 5 mg tablet, Take 1 tablet (0 5 mg total) by mouth daily at bedtime, Disp: 3 tablet, Rfl: 0    magnesium hydroxide (MILK OF MAGNESIA) 400 mg/5 mL oral suspension, Take by mouth daily as needed for constipation, Disp: , Rfl:     melatonin 3 mg, Take 6 mg by mouth daily at bedtime, Disp: , Rfl:     metoprolol tartrate (LOPRESSOR) 100 mg tablet, Take 1 tablet (100 mg total) by mouth every 12 (twelve) hours, Disp: 180 tablet, Rfl: 3    nitroglycerin (NITROSTAT) 0 4 mg SL tablet, Place 1 tablet (0 4 mg total) under the tongue every 5 (five) minutes as needed for chest pain, Disp: 90 tablet, Rfl: 0    potassium chloride (K-DUR,KLOR-CON) 20 mEq tablet, Take 1 tablet (20 mEq total) by mouth daily, Disp: 15 tablet, Rfl: 0    senna (SENOKOT) 8 6 MG tablet, Take 1 tablet by mouth daily, Disp: , Rfl:     sodium phosphate (PEDIA-LAX) 3 5-9 5 g 59 mL enema, Insert 1 enema into the rectum once, Disp: , Rfl:     Allergies   Allergen Reactions    Dye [Iodinated Diagnostic Agents] Other (See Comments)     allergy    Spiriva Handihaler [Tiotropium Bromide Monohydrate] Swelling    Ramipril Facial Swelling    Penicillins      unsure        Review of Systems:  Review of Systems   Constitutional: Positive for appetite change (decrease)  HENT: Negative  Eyes: Negative  Respiratory: Negative  Cardiovascular: Negative  Gastrointestinal: Positive for nausea  Endocrine: Positive for heat intolerance  Genitourinary: Negative  Musculoskeletal: Negative  Skin:        Skin dryness/itchyness   Allergic/Immunologic: Negative  Neurological: Positive for headaches  Memory loss       There were no vitals filed for this visit  OB/GYN History:  The patient underwent menarche at 16 years  Menopause Status Pre, Joyce, Post, Unknown and No Answer  No LMP recorded  Patient is postmenopausal   Menopause at 47 years  Menopause Reason  Hormone replacement therapy: no   Years used   8   Para 4   Age at first delivery being 26 years  Nursing: no    Birth control pills: no     Pregnancy test needed:  no    PFT    Imaging:No images are attached to the encounter       Teaching: Ailyn Foote RN     MST    Implantable Devices [facility], Pacemaker, pain stimulator)    Hip Replacement: No

## 2021-07-13 ENCOUNTER — TELEPHONE (OUTPATIENT)
Dept: NUTRITION | Facility: CLINIC | Age: 76
End: 2021-07-13

## 2021-07-13 ENCOUNTER — TELEPHONE (OUTPATIENT)
Dept: PALLIATIVE MEDICINE | Facility: CLINIC | Age: 76
End: 2021-07-13

## 2021-07-19 ENCOUNTER — HOSPITAL ENCOUNTER (INPATIENT)
Facility: HOSPITAL | Age: 76
LOS: 8 days | Discharge: NURSING, REHAB OR LONG-TERM CARE | DRG: 177 | End: 2021-07-27
Attending: EMERGENCY MEDICINE | Admitting: FAMILY MEDICINE
Payer: MEDICARE

## 2021-07-19 ENCOUNTER — APPOINTMENT (EMERGENCY)
Dept: RADIOLOGY | Facility: HOSPITAL | Age: 76
DRG: 177 | End: 2021-07-19
Payer: MEDICARE

## 2021-07-19 ENCOUNTER — TELEPHONE (OUTPATIENT)
Dept: HEMATOLOGY ONCOLOGY | Facility: CLINIC | Age: 76
End: 2021-07-19

## 2021-07-19 DIAGNOSIS — J96.01 ACUTE RESPIRATORY FAILURE WITH HYPOXIA (HCC): Primary | ICD-10-CM

## 2021-07-19 DIAGNOSIS — I48.20 CHRONIC ATRIAL FIBRILLATION (HCC): ICD-10-CM

## 2021-07-19 DIAGNOSIS — U07.1 COVID-19 VIRUS INFECTION: ICD-10-CM

## 2021-07-19 DIAGNOSIS — E66.01 OBESITY, MORBID (HCC): ICD-10-CM

## 2021-07-19 DIAGNOSIS — I50.9 CHF (CONGESTIVE HEART FAILURE) (HCC): ICD-10-CM

## 2021-07-19 DIAGNOSIS — B96.89 URINARY TRACT INFECTION DUE TO ESBL KLEBSIELLA: ICD-10-CM

## 2021-07-19 DIAGNOSIS — N39.0 URINARY TRACT INFECTION DUE TO ESBL KLEBSIELLA: ICD-10-CM

## 2021-07-19 DIAGNOSIS — I82.409 ACUTE DVT (DEEP VENOUS THROMBOSIS) (HCC): ICD-10-CM

## 2021-07-19 PROBLEM — I50.22 CHRONIC SYSTOLIC HEART FAILURE (HCC): Status: ACTIVE | Noted: 2021-07-19

## 2021-07-19 LAB
ALBUMIN SERPL BCP-MCNC: 2.5 G/DL (ref 3.5–5)
ALP SERPL-CCNC: 68 U/L (ref 46–116)
ALT SERPL W P-5'-P-CCNC: 15 U/L (ref 12–78)
ANION GAP SERPL CALCULATED.3IONS-SCNC: 11 MMOL/L (ref 4–13)
APTT PPP: 18 SECONDS (ref 23–37)
AST SERPL W P-5'-P-CCNC: 22 U/L (ref 5–45)
ATRIAL RATE: 57 BPM
ATRIAL RATE: 60 BPM
BACTERIA UR QL AUTO: ABNORMAL /HPF
BASE EX.OXY STD BLDV CALC-SCNC: 89.5 % (ref 60–80)
BASE EXCESS BLDV CALC-SCNC: 0.7 MMOL/L
BASOPHILS # BLD AUTO: 0.04 THOUSANDS/ΜL (ref 0–0.1)
BASOPHILS NFR BLD AUTO: 1 % (ref 0–1)
BILIRUB DIRECT SERPL-MCNC: 0.1 MG/DL (ref 0–0.2)
BILIRUB SERPL-MCNC: 0.25 MG/DL (ref 0.2–1)
BILIRUB UR QL STRIP: NEGATIVE
BUN SERPL-MCNC: 23 MG/DL (ref 5–25)
CALCIUM SERPL-MCNC: 8.1 MG/DL (ref 8.3–10.1)
CHLORIDE SERPL-SCNC: 113 MMOL/L (ref 100–108)
CLARITY UR: ABNORMAL
CO2 SERPL-SCNC: 24 MMOL/L (ref 21–32)
COLOR UR: YELLOW
CREAT SERPL-MCNC: 1.36 MG/DL (ref 0.6–1.3)
CRP SERPL QL: 25.6 MG/L
D DIMER PPP FEU-MCNC: 10.66 UG/ML FEU
EOSINOPHIL # BLD AUTO: 0.28 THOUSAND/ΜL (ref 0–0.61)
EOSINOPHIL NFR BLD AUTO: 4 % (ref 0–6)
ERYTHROCYTE [DISTWIDTH] IN BLOOD BY AUTOMATED COUNT: 15 % (ref 11.6–15.1)
FERRITIN SERPL-MCNC: 295 NG/ML (ref 8–388)
GFR SERPL CREATININE-BSD FRML MDRD: 38 ML/MIN/1.73SQ M
GLUCOSE SERPL-MCNC: 110 MG/DL (ref 65–140)
GLUCOSE SERPL-MCNC: 173 MG/DL (ref 65–140)
GLUCOSE SERPL-MCNC: 232 MG/DL (ref 65–140)
GLUCOSE SERPL-MCNC: 250 MG/DL (ref 65–140)
GLUCOSE UR STRIP-MCNC: NEGATIVE MG/DL
HCO3 BLDV-SCNC: 24.3 MMOL/L (ref 24–30)
HCT VFR BLD AUTO: 32.8 % (ref 34.8–46.1)
HGB BLD-MCNC: 9.7 G/DL (ref 11.5–15.4)
HGB UR QL STRIP.AUTO: ABNORMAL
IMM GRANULOCYTES # BLD AUTO: 0.12 THOUSAND/UL (ref 0–0.2)
IMM GRANULOCYTES NFR BLD AUTO: 2 % (ref 0–2)
INR PPP: 1.12 (ref 0.84–1.19)
KETONES UR STRIP-MCNC: NEGATIVE MG/DL
LACTATE SERPL-SCNC: 1.8 MMOL/L (ref 0.5–2)
LACTATE SERPL-SCNC: 3 MMOL/L (ref 0.5–2)
LEUKOCYTE ESTERASE UR QL STRIP: ABNORMAL
LYMPHOCYTES # BLD AUTO: 1.52 THOUSANDS/ΜL (ref 0.6–4.47)
LYMPHOCYTES NFR BLD AUTO: 21 % (ref 14–44)
MCH RBC QN AUTO: 32.7 PG (ref 26.8–34.3)
MCHC RBC AUTO-ENTMCNC: 29.6 G/DL (ref 31.4–37.4)
MCV RBC AUTO: 110 FL (ref 82–98)
MONOCYTES # BLD AUTO: 0.34 THOUSAND/ΜL (ref 0.17–1.22)
MONOCYTES NFR BLD AUTO: 5 % (ref 4–12)
NEUTROPHILS # BLD AUTO: 5.1 THOUSANDS/ΜL (ref 1.85–7.62)
NEUTS SEG NFR BLD AUTO: 67 % (ref 43–75)
NITRITE UR QL STRIP: POSITIVE
NON-SQ EPI CELLS URNS QL MICRO: ABNORMAL /HPF
NRBC BLD AUTO-RTO: 0 /100 WBCS
NT-PROBNP SERPL-MCNC: 8656 PG/ML
O2 CT BLDV-SCNC: 13.6 ML/DL
P AXIS: 40 DEGREES
P AXIS: 77 DEGREES
PCO2 BLDV: 35.4 MM HG (ref 42–50)
PH BLDV: 7.46 [PH] (ref 7.3–7.4)
PH UR STRIP.AUTO: 6 [PH]
PLATELET # BLD AUTO: 160 THOUSANDS/UL (ref 149–390)
PMV BLD AUTO: 12.2 FL (ref 8.9–12.7)
PO2 BLDV: 66.8 MM HG (ref 35–45)
POTASSIUM SERPL-SCNC: 4.1 MMOL/L (ref 3.5–5.3)
PR INTERVAL: 212 MS
PR INTERVAL: 254 MS
PROCALCITONIN SERPL-MCNC: 0.18 NG/ML
PROT SERPL-MCNC: 6.4 G/DL (ref 6.4–8.2)
PROT UR STRIP-MCNC: ABNORMAL MG/DL
PROTHROMBIN TIME: 14.7 SECONDS (ref 11.6–14.5)
QRS AXIS: 4 DEGREES
QRS AXIS: 8 DEGREES
QRSD INTERVAL: 100 MS
QRSD INTERVAL: 122 MS
QT INTERVAL: 448 MS
QT INTERVAL: 492 MS
QTC INTERVAL: 436 MS
QTC INTERVAL: 492 MS
RBC # BLD AUTO: 2.97 MILLION/UL (ref 3.81–5.12)
RBC #/AREA URNS AUTO: ABNORMAL /HPF
SARS-COV-2 RNA RESP QL NAA+PROBE: POSITIVE
SODIUM SERPL-SCNC: 148 MMOL/L (ref 136–145)
SP GR UR STRIP.AUTO: 1.02 (ref 1–1.03)
T WAVE AXIS: -16 DEGREES
T WAVE AXIS: 34 DEGREES
TROPONIN I SERPL-MCNC: 0.18 NG/ML
TROPONIN I SERPL-MCNC: 1.03 NG/ML
UROBILINOGEN UR QL STRIP.AUTO: 0.2 E.U./DL
VENTRICULAR RATE: 57 BPM
VENTRICULAR RATE: 60 BPM
WBC # BLD AUTO: 7.4 THOUSAND/UL (ref 4.31–10.16)
WBC #/AREA URNS AUTO: ABNORMAL /HPF

## 2021-07-19 PROCEDURE — 82948 REAGENT STRIP/BLOOD GLUCOSE: CPT

## 2021-07-19 PROCEDURE — XW033E5 INTRODUCTION OF REMDESIVIR ANTI-INFECTIVE INTO PERIPHERAL VEIN, PERCUTANEOUS APPROACH, NEW TECHNOLOGY GROUP 5: ICD-10-PCS | Performed by: INTERNAL MEDICINE

## 2021-07-19 PROCEDURE — 85730 THROMBOPLASTIN TIME PARTIAL: CPT | Performed by: PHYSICIAN ASSISTANT

## 2021-07-19 PROCEDURE — 81001 URINALYSIS AUTO W/SCOPE: CPT | Performed by: PHYSICIAN ASSISTANT

## 2021-07-19 PROCEDURE — 94760 N-INVAS EAR/PLS OXIMETRY 1: CPT

## 2021-07-19 PROCEDURE — 36415 COLL VENOUS BLD VENIPUNCTURE: CPT | Performed by: PHYSICIAN ASSISTANT

## 2021-07-19 PROCEDURE — 80048 BASIC METABOLIC PNL TOTAL CA: CPT | Performed by: PHYSICIAN ASSISTANT

## 2021-07-19 PROCEDURE — 93010 ELECTROCARDIOGRAM REPORT: CPT | Performed by: INTERNAL MEDICINE

## 2021-07-19 PROCEDURE — 87040 BLOOD CULTURE FOR BACTERIA: CPT | Performed by: PHYSICIAN ASSISTANT

## 2021-07-19 PROCEDURE — U0005 INFEC AGEN DETEC AMPLI PROBE: HCPCS | Performed by: PHYSICIAN ASSISTANT

## 2021-07-19 PROCEDURE — 83880 ASSAY OF NATRIURETIC PEPTIDE: CPT | Performed by: PHYSICIAN ASSISTANT

## 2021-07-19 PROCEDURE — 94002 VENT MGMT INPAT INIT DAY: CPT

## 2021-07-19 PROCEDURE — 87186 SC STD MICRODIL/AGAR DIL: CPT | Performed by: PHYSICIAN ASSISTANT

## 2021-07-19 PROCEDURE — 86140 C-REACTIVE PROTEIN: CPT | Performed by: STUDENT IN AN ORGANIZED HEALTH CARE EDUCATION/TRAINING PROGRAM

## 2021-07-19 PROCEDURE — 84484 ASSAY OF TROPONIN QUANT: CPT | Performed by: PHYSICIAN ASSISTANT

## 2021-07-19 PROCEDURE — 99223 1ST HOSP IP/OBS HIGH 75: CPT | Performed by: STUDENT IN AN ORGANIZED HEALTH CARE EDUCATION/TRAINING PROGRAM

## 2021-07-19 PROCEDURE — U0003 INFECTIOUS AGENT DETECTION BY NUCLEIC ACID (DNA OR RNA); SEVERE ACUTE RESPIRATORY SYNDROME CORONAVIRUS 2 (SARS-COV-2) (CORONAVIRUS DISEASE [COVID-19]), AMPLIFIED PROBE TECHNIQUE, MAKING USE OF HIGH THROUGHPUT TECHNOLOGIES AS DESCRIBED BY CMS-2020-01-R: HCPCS | Performed by: PHYSICIAN ASSISTANT

## 2021-07-19 PROCEDURE — 85610 PROTHROMBIN TIME: CPT | Performed by: PHYSICIAN ASSISTANT

## 2021-07-19 PROCEDURE — 82805 BLOOD GASES W/O2 SATURATION: CPT | Performed by: PHYSICIAN ASSISTANT

## 2021-07-19 PROCEDURE — 99285 EMERGENCY DEPT VISIT HI MDM: CPT

## 2021-07-19 PROCEDURE — 80076 HEPATIC FUNCTION PANEL: CPT | Performed by: PHYSICIAN ASSISTANT

## 2021-07-19 PROCEDURE — 82728 ASSAY OF FERRITIN: CPT | Performed by: STUDENT IN AN ORGANIZED HEALTH CARE EDUCATION/TRAINING PROGRAM

## 2021-07-19 PROCEDURE — 85379 FIBRIN DEGRADATION QUANT: CPT | Performed by: STUDENT IN AN ORGANIZED HEALTH CARE EDUCATION/TRAINING PROGRAM

## 2021-07-19 PROCEDURE — 84484 ASSAY OF TROPONIN QUANT: CPT | Performed by: STUDENT IN AN ORGANIZED HEALTH CARE EDUCATION/TRAINING PROGRAM

## 2021-07-19 PROCEDURE — 84145 PROCALCITONIN (PCT): CPT | Performed by: STUDENT IN AN ORGANIZED HEALTH CARE EDUCATION/TRAINING PROGRAM

## 2021-07-19 PROCEDURE — 71045 X-RAY EXAM CHEST 1 VIEW: CPT

## 2021-07-19 PROCEDURE — 87077 CULTURE AEROBIC IDENTIFY: CPT | Performed by: PHYSICIAN ASSISTANT

## 2021-07-19 PROCEDURE — 87086 URINE CULTURE/COLONY COUNT: CPT | Performed by: PHYSICIAN ASSISTANT

## 2021-07-19 PROCEDURE — 99291 CRITICAL CARE FIRST HOUR: CPT | Performed by: PHYSICIAN ASSISTANT

## 2021-07-19 PROCEDURE — 85025 COMPLETE CBC W/AUTO DIFF WBC: CPT | Performed by: PHYSICIAN ASSISTANT

## 2021-07-19 PROCEDURE — 93005 ELECTROCARDIOGRAM TRACING: CPT

## 2021-07-19 PROCEDURE — 83605 ASSAY OF LACTIC ACID: CPT | Performed by: PHYSICIAN ASSISTANT

## 2021-07-19 PROCEDURE — 1123F ACP DISCUSS/DSCN MKR DOCD: CPT | Performed by: STUDENT IN AN ORGANIZED HEALTH CARE EDUCATION/TRAINING PROGRAM

## 2021-07-19 PROCEDURE — 94664 DEMO&/EVAL PT USE INHALER: CPT

## 2021-07-19 RX ORDER — ATORVASTATIN CALCIUM 40 MG/1
40 TABLET, FILM COATED ORAL
Status: DISCONTINUED | OUTPATIENT
Start: 2021-07-19 | End: 2021-07-27 | Stop reason: HOSPADM

## 2021-07-19 RX ORDER — LEVOTHYROXINE SODIUM 0.1 MG/1
100 TABLET ORAL
Status: DISCONTINUED | OUTPATIENT
Start: 2021-07-19 | End: 2021-07-27 | Stop reason: HOSPADM

## 2021-07-19 RX ORDER — DIGOXIN 125 MCG
125 TABLET ORAL DAILY
Status: DISCONTINUED | OUTPATIENT
Start: 2021-07-19 | End: 2021-07-27 | Stop reason: HOSPADM

## 2021-07-19 RX ORDER — FAMOTIDINE 20 MG/1
20 TABLET, FILM COATED ORAL DAILY
Status: DISCONTINUED | OUTPATIENT
Start: 2021-07-19 | End: 2021-07-26

## 2021-07-19 RX ORDER — MULTIVITAMIN/IRON/FOLIC ACID 18MG-0.4MG
1 TABLET ORAL DAILY
Status: DISCONTINUED | OUTPATIENT
Start: 2021-07-26 | End: 2021-07-20

## 2021-07-19 RX ORDER — DOXYCYCLINE HYCLATE 100 MG/1
100 CAPSULE ORAL EVERY 12 HOURS SCHEDULED
Status: DISCONTINUED | OUTPATIENT
Start: 2021-07-19 | End: 2021-07-20

## 2021-07-19 RX ORDER — ZINC SULFATE 50(220)MG
220 CAPSULE ORAL DAILY
Status: DISCONTINUED | OUTPATIENT
Start: 2021-07-19 | End: 2021-07-20

## 2021-07-19 RX ORDER — METOPROLOL TARTRATE 50 MG/1
100 TABLET, FILM COATED ORAL EVERY 12 HOURS SCHEDULED
Status: DISCONTINUED | OUTPATIENT
Start: 2021-07-19 | End: 2021-07-22

## 2021-07-19 RX ORDER — DEXAMETHASONE SODIUM PHOSPHATE 4 MG/ML
6 INJECTION, SOLUTION INTRA-ARTICULAR; INTRALESIONAL; INTRAMUSCULAR; INTRAVENOUS; SOFT TISSUE EVERY 24 HOURS
Status: DISCONTINUED | OUTPATIENT
Start: 2021-07-20 | End: 2021-07-26

## 2021-07-19 RX ORDER — ASCORBIC ACID 500 MG
1000 TABLET ORAL EVERY 12 HOURS SCHEDULED
Status: DISPENSED | OUTPATIENT
Start: 2021-07-19 | End: 2021-07-26

## 2021-07-19 RX ORDER — DEXAMETHASONE SODIUM PHOSPHATE 10 MG/ML
10 INJECTION, SOLUTION INTRAMUSCULAR; INTRAVENOUS ONCE
Status: COMPLETED | OUTPATIENT
Start: 2021-07-19 | End: 2021-07-19

## 2021-07-19 RX ORDER — CARBAMAZEPINE 100 MG/1
100 TABLET, CHEWABLE ORAL 3 TIMES DAILY
Status: DISCONTINUED | OUTPATIENT
Start: 2021-07-19 | End: 2021-07-27 | Stop reason: HOSPADM

## 2021-07-19 RX ORDER — MELATONIN
2000 DAILY
Status: DISCONTINUED | OUTPATIENT
Start: 2021-07-19 | End: 2021-07-27 | Stop reason: HOSPADM

## 2021-07-19 RX ORDER — DOXYCYCLINE HYCLATE 100 MG/1
100 CAPSULE ORAL EVERY 12 HOURS
Status: DISCONTINUED | OUTPATIENT
Start: 2021-07-19 | End: 2021-07-19 | Stop reason: SDUPTHER

## 2021-07-19 RX ORDER — DIVALPROEX SODIUM 250 MG/1
250 TABLET, DELAYED RELEASE ORAL EVERY 12 HOURS SCHEDULED
Status: DISCONTINUED | OUTPATIENT
Start: 2021-07-19 | End: 2021-07-20

## 2021-07-19 RX ORDER — FUROSEMIDE 10 MG/ML
40 INJECTION INTRAMUSCULAR; INTRAVENOUS ONCE
Status: COMPLETED | OUTPATIENT
Start: 2021-07-19 | End: 2021-07-19

## 2021-07-19 RX ORDER — ALBUTEROL SULFATE 90 UG/1
2 AEROSOL, METERED RESPIRATORY (INHALATION) EVERY 4 HOURS PRN
Status: DISCONTINUED | OUTPATIENT
Start: 2021-07-19 | End: 2021-07-27 | Stop reason: HOSPADM

## 2021-07-19 RX ADMIN — METOPROLOL TARTRATE 100 MG: 50 TABLET, FILM COATED ORAL at 21:52

## 2021-07-19 RX ADMIN — DIVALPROEX SODIUM 250 MG: 250 TABLET, DELAYED RELEASE ORAL at 09:55

## 2021-07-19 RX ADMIN — DIVALPROEX SODIUM 250 MG: 250 TABLET, DELAYED RELEASE ORAL at 21:54

## 2021-07-19 RX ADMIN — FAMOTIDINE 20 MG: 20 TABLET ORAL at 09:54

## 2021-07-19 RX ADMIN — REMDESIVIR 200 MG: 100 INJECTION, POWDER, LYOPHILIZED, FOR SOLUTION INTRAVENOUS at 10:01

## 2021-07-19 RX ADMIN — CARBAMAZEPINE 100 MG: 100 TABLET, CHEWABLE ORAL at 21:53

## 2021-07-19 RX ADMIN — INSULIN LISPRO 3 UNITS: 100 INJECTION, SOLUTION INTRAVENOUS; SUBCUTANEOUS at 16:06

## 2021-07-19 RX ADMIN — APIXABAN 10 MG: 5 TABLET, FILM COATED ORAL at 12:53

## 2021-07-19 RX ADMIN — DIGOXIN 125 MCG: 125 TABLET ORAL at 15:24

## 2021-07-19 RX ADMIN — ATORVASTATIN CALCIUM 40 MG: 40 TABLET, FILM COATED ORAL at 21:53

## 2021-07-19 RX ADMIN — LEVOTHYROXINE SODIUM 100 MCG: 100 TABLET ORAL at 09:55

## 2021-07-19 RX ADMIN — DOXYCYCLINE 100 MG: 100 CAPSULE ORAL at 12:53

## 2021-07-19 RX ADMIN — OXYCODONE HYDROCHLORIDE AND ACETAMINOPHEN 1000 MG: 500 TABLET ORAL at 09:56

## 2021-07-19 RX ADMIN — CARBAMAZEPINE 100 MG: 100 TABLET, CHEWABLE ORAL at 09:55

## 2021-07-19 RX ADMIN — FUROSEMIDE 40 MG: 10 INJECTION, SOLUTION INTRAMUSCULAR; INTRAVENOUS at 08:38

## 2021-07-19 RX ADMIN — DEXAMETHASONE SODIUM PHOSPHATE 10 MG: 10 INJECTION, SOLUTION INTRAMUSCULAR; INTRAVENOUS at 08:38

## 2021-07-19 RX ADMIN — INSULIN LISPRO 1 UNITS: 100 INJECTION, SOLUTION INTRAVENOUS; SUBCUTANEOUS at 13:14

## 2021-07-19 RX ADMIN — ZINC SULFATE 220 MG (50 MG) CAPSULE 220 MG: CAPSULE at 09:55

## 2021-07-19 RX ADMIN — CARBAMAZEPINE 100 MG: 100 TABLET, CHEWABLE ORAL at 15:24

## 2021-07-19 RX ADMIN — OXYCODONE HYDROCHLORIDE AND ACETAMINOPHEN 1000 MG: 500 TABLET ORAL at 21:53

## 2021-07-19 RX ADMIN — SODIUM CHLORIDE 250 ML: 0.9 INJECTION, SOLUTION INTRAVENOUS at 10:04

## 2021-07-19 RX ADMIN — DOXYCYCLINE 100 MG: 100 CAPSULE ORAL at 21:53

## 2021-07-19 RX ADMIN — CEFTRIAXONE SODIUM 1000 MG: 10 INJECTION, POWDER, FOR SOLUTION INTRAVENOUS at 08:38

## 2021-07-19 RX ADMIN — Medication 2000 UNITS: at 09:55

## 2021-07-19 RX ADMIN — APIXABAN 10 MG: 5 TABLET, FILM COATED ORAL at 19:04

## 2021-07-19 NOTE — TELEPHONE ENCOUNTER
Appointment Cancellation Or Reschedule     Person calling in [facility]   Provider Dr Jose Lucero    Office Visit Date and Time  07/23/21 at 9 am    Office Visit Location [place]   Did patient want to reschedule their office appointment? If so, when was it scheduled to? no   Is this patient calling to reschedule an infusion appointment? no   When is their next infusion appointment? No appt   Is this patient a Chemo patient? no   Reason for Cancellation or Reschedule [facility] cancelled appt patient is admitted to hospital and is also positive for covid-19  If the patient is a treatment patient, please route this to the office nurse  If the patient is not on treatment, please route to the office MA

## 2021-07-19 NOTE — TELEPHONE ENCOUNTER
Patient daughter Heraclio Etienne called to cancel consult appt with Dr Nimesh Faria on 07/23/21 and will call back to reschedule appt

## 2021-07-20 ENCOUNTER — APPOINTMENT (INPATIENT)
Dept: ULTRASOUND IMAGING | Facility: HOSPITAL | Age: 76
DRG: 177 | End: 2021-07-20
Payer: MEDICARE

## 2021-07-20 LAB
ALBUMIN SERPL BCP-MCNC: 2.7 G/DL (ref 3.5–5)
ALP SERPL-CCNC: 59 U/L (ref 46–116)
ALT SERPL W P-5'-P-CCNC: 16 U/L (ref 12–78)
ANION GAP SERPL CALCULATED.3IONS-SCNC: 7 MMOL/L (ref 4–13)
AST SERPL W P-5'-P-CCNC: 16 U/L (ref 5–45)
BASOPHILS # BLD AUTO: 0.03 THOUSANDS/ΜL (ref 0–0.1)
BASOPHILS NFR BLD AUTO: 0 % (ref 0–1)
BILIRUB SERPL-MCNC: 0.24 MG/DL (ref 0.2–1)
BUN SERPL-MCNC: 32 MG/DL (ref 5–25)
CALCIUM ALBUM COR SERPL-MCNC: 9.8 MG/DL (ref 8.3–10.1)
CALCIUM SERPL-MCNC: 8.8 MG/DL (ref 8.3–10.1)
CHLORIDE SERPL-SCNC: 110 MMOL/L (ref 100–108)
CO2 SERPL-SCNC: 30 MMOL/L (ref 21–32)
CREAT SERPL-MCNC: 1.38 MG/DL (ref 0.6–1.3)
CRP SERPL QL: 31 MG/L
D DIMER PPP FEU-MCNC: 9.87 UG/ML FEU
EOSINOPHIL # BLD AUTO: 0.06 THOUSAND/ΜL (ref 0–0.61)
EOSINOPHIL NFR BLD AUTO: 1 % (ref 0–6)
ERYTHROCYTE [DISTWIDTH] IN BLOOD BY AUTOMATED COUNT: 14.8 % (ref 11.6–15.1)
FERRITIN SERPL-MCNC: 253 NG/ML (ref 8–388)
GFR SERPL CREATININE-BSD FRML MDRD: 38 ML/MIN/1.73SQ M
GLUCOSE SERPL-MCNC: 106 MG/DL (ref 65–140)
GLUCOSE SERPL-MCNC: 114 MG/DL (ref 65–140)
GLUCOSE SERPL-MCNC: 136 MG/DL (ref 65–140)
GLUCOSE SERPL-MCNC: 189 MG/DL (ref 65–140)
GLUCOSE SERPL-MCNC: 202 MG/DL (ref 65–140)
HCT VFR BLD AUTO: 29.6 % (ref 34.8–46.1)
HGB BLD-MCNC: 9.1 G/DL (ref 11.5–15.4)
IMM GRANULOCYTES # BLD AUTO: 0.05 THOUSAND/UL (ref 0–0.2)
IMM GRANULOCYTES NFR BLD AUTO: 1 % (ref 0–2)
LYMPHOCYTES # BLD AUTO: 2.27 THOUSANDS/ΜL (ref 0.6–4.47)
LYMPHOCYTES NFR BLD AUTO: 31 % (ref 14–44)
MCH RBC QN AUTO: 33.2 PG (ref 26.8–34.3)
MCHC RBC AUTO-ENTMCNC: 30.7 G/DL (ref 31.4–37.4)
MCV RBC AUTO: 108 FL (ref 82–98)
MONOCYTES # BLD AUTO: 0.67 THOUSAND/ΜL (ref 0.17–1.22)
MONOCYTES NFR BLD AUTO: 9 % (ref 4–12)
NEUTROPHILS # BLD AUTO: 4.27 THOUSANDS/ΜL (ref 1.85–7.62)
NEUTS SEG NFR BLD AUTO: 58 % (ref 43–75)
NRBC BLD AUTO-RTO: 0 /100 WBCS
PLATELET # BLD AUTO: 166 THOUSANDS/UL (ref 149–390)
PMV BLD AUTO: 11.1 FL (ref 8.9–12.7)
POTASSIUM SERPL-SCNC: 3.9 MMOL/L (ref 3.5–5.3)
PROCALCITONIN SERPL-MCNC: 0.65 NG/ML
PROT SERPL-MCNC: 6.4 G/DL (ref 6.4–8.2)
RBC # BLD AUTO: 2.74 MILLION/UL (ref 3.81–5.12)
SODIUM SERPL-SCNC: 147 MMOL/L (ref 136–145)
WBC # BLD AUTO: 7.35 THOUSAND/UL (ref 4.31–10.16)

## 2021-07-20 PROCEDURE — 99232 SBSQ HOSP IP/OBS MODERATE 35: CPT | Performed by: INTERNAL MEDICINE

## 2021-07-20 PROCEDURE — 86140 C-REACTIVE PROTEIN: CPT | Performed by: STUDENT IN AN ORGANIZED HEALTH CARE EDUCATION/TRAINING PROGRAM

## 2021-07-20 PROCEDURE — 85025 COMPLETE CBC W/AUTO DIFF WBC: CPT | Performed by: STUDENT IN AN ORGANIZED HEALTH CARE EDUCATION/TRAINING PROGRAM

## 2021-07-20 PROCEDURE — 80053 COMPREHEN METABOLIC PANEL: CPT | Performed by: STUDENT IN AN ORGANIZED HEALTH CARE EDUCATION/TRAINING PROGRAM

## 2021-07-20 PROCEDURE — 94664 DEMO&/EVAL PT USE INHALER: CPT

## 2021-07-20 PROCEDURE — 82948 REAGENT STRIP/BLOOD GLUCOSE: CPT

## 2021-07-20 PROCEDURE — 85379 FIBRIN DEGRADATION QUANT: CPT | Performed by: STUDENT IN AN ORGANIZED HEALTH CARE EDUCATION/TRAINING PROGRAM

## 2021-07-20 PROCEDURE — 84145 PROCALCITONIN (PCT): CPT | Performed by: STUDENT IN AN ORGANIZED HEALTH CARE EDUCATION/TRAINING PROGRAM

## 2021-07-20 PROCEDURE — 82728 ASSAY OF FERRITIN: CPT | Performed by: STUDENT IN AN ORGANIZED HEALTH CARE EDUCATION/TRAINING PROGRAM

## 2021-07-20 PROCEDURE — 93970 EXTREMITY STUDY: CPT

## 2021-07-20 PROCEDURE — 94760 N-INVAS EAR/PLS OXIMETRY 1: CPT

## 2021-07-20 RX ORDER — DEXTROSE AND SODIUM CHLORIDE 5; .45 G/100ML; G/100ML
75 INJECTION, SOLUTION INTRAVENOUS CONTINUOUS
Status: DISPENSED | OUTPATIENT
Start: 2021-07-20 | End: 2021-07-21

## 2021-07-20 RX ORDER — MULTIVIT-MIN/FERROUS GLUCONATE 9 MG/15 ML
15 LIQUID (ML) ORAL DAILY
Status: DISCONTINUED | OUTPATIENT
Start: 2021-07-20 | End: 2021-07-27 | Stop reason: HOSPADM

## 2021-07-20 RX ORDER — VALPROIC ACID 250 MG/5ML
250 SOLUTION ORAL EVERY 12 HOURS SCHEDULED
Status: DISCONTINUED | OUTPATIENT
Start: 2021-07-20 | End: 2021-07-27 | Stop reason: HOSPADM

## 2021-07-20 RX ADMIN — DEXTROSE AND SODIUM CHLORIDE 75 ML/HR: 5; .45 INJECTION, SOLUTION INTRAVENOUS at 13:49

## 2021-07-20 RX ADMIN — DIGOXIN 125 MCG: 125 TABLET ORAL at 09:53

## 2021-07-20 RX ADMIN — APIXABAN 5 MG: 5 TABLET, FILM COATED ORAL at 17:27

## 2021-07-20 RX ADMIN — LEVOTHYROXINE SODIUM 100 MCG: 100 TABLET ORAL at 06:11

## 2021-07-20 RX ADMIN — DEXAMETHASONE SODIUM PHOSPHATE 6 MG: 4 INJECTION INTRA-ARTICULAR; INTRALESIONAL; INTRAMUSCULAR; INTRAVENOUS; SOFT TISSUE at 09:30

## 2021-07-20 RX ADMIN — APIXABAN 10 MG: 5 TABLET, FILM COATED ORAL at 09:28

## 2021-07-20 RX ADMIN — OXYCODONE HYDROCHLORIDE AND ACETAMINOPHEN 1000 MG: 500 TABLET ORAL at 09:28

## 2021-07-20 RX ADMIN — ZINC SULFATE 220 MG (50 MG) CAPSULE 220 MG: CAPSULE at 09:29

## 2021-07-20 RX ADMIN — CARBAMAZEPINE 100 MG: 100 TABLET, CHEWABLE ORAL at 21:58

## 2021-07-20 RX ADMIN — OXYCODONE HYDROCHLORIDE AND ACETAMINOPHEN 1000 MG: 500 TABLET ORAL at 21:02

## 2021-07-20 RX ADMIN — CARBAMAZEPINE 100 MG: 100 TABLET, CHEWABLE ORAL at 09:28

## 2021-07-20 RX ADMIN — CARBAMAZEPINE 100 MG: 100 TABLET, CHEWABLE ORAL at 17:27

## 2021-07-20 RX ADMIN — ATORVASTATIN CALCIUM 40 MG: 40 TABLET, FILM COATED ORAL at 21:02

## 2021-07-20 RX ADMIN — CEFTRIAXONE SODIUM 1000 MG: 10 INJECTION, POWDER, FOR SOLUTION INTRAVENOUS at 13:40

## 2021-07-20 RX ADMIN — METOPROLOL TARTRATE 100 MG: 50 TABLET, FILM COATED ORAL at 21:02

## 2021-07-20 RX ADMIN — DOXYCYCLINE 100 MG: 100 INJECTION, POWDER, LYOPHILIZED, FOR SOLUTION INTRAVENOUS at 19:20

## 2021-07-20 RX ADMIN — Medication 2000 UNITS: at 09:28

## 2021-07-20 RX ADMIN — VALPROIC ACID 250 MG: 500 SOLUTION ORAL at 21:58

## 2021-07-20 RX ADMIN — REMDESIVIR 100 MG: 5 INJECTION INTRAVENOUS at 15:35

## 2021-07-20 RX ADMIN — VALPROIC ACID 250 MG: 500 SOLUTION ORAL at 14:07

## 2021-07-20 RX ADMIN — FAMOTIDINE 20 MG: 20 TABLET ORAL at 09:29

## 2021-07-20 RX ADMIN — INSULIN LISPRO 1 UNITS: 100 INJECTION, SOLUTION INTRAVENOUS; SUBCUTANEOUS at 19:25

## 2021-07-21 ENCOUNTER — APPOINTMENT (INPATIENT)
Dept: RADIOLOGY | Facility: HOSPITAL | Age: 76
DRG: 177 | End: 2021-07-21
Payer: MEDICARE

## 2021-07-21 PROBLEM — B96.89 URINARY TRACT INFECTION DUE TO ESBL KLEBSIELLA: Status: ACTIVE | Noted: 2021-07-19

## 2021-07-21 PROBLEM — I82.409 ACUTE DVT (DEEP VENOUS THROMBOSIS) (HCC): Status: ACTIVE | Noted: 2021-07-21

## 2021-07-21 LAB
ALBUMIN SERPL BCP-MCNC: 3 G/DL (ref 3.5–5)
ALP SERPL-CCNC: 68 U/L (ref 46–116)
ALT SERPL W P-5'-P-CCNC: 14 U/L (ref 12–78)
ANION GAP SERPL CALCULATED.3IONS-SCNC: 10 MMOL/L (ref 4–13)
AST SERPL W P-5'-P-CCNC: 16 U/L (ref 5–45)
BASOPHILS # BLD AUTO: 0.04 THOUSANDS/ΜL (ref 0–0.1)
BASOPHILS NFR BLD AUTO: 1 % (ref 0–1)
BILIRUB SERPL-MCNC: 0.27 MG/DL (ref 0.2–1)
BUN SERPL-MCNC: 34 MG/DL (ref 5–25)
CALCIUM ALBUM COR SERPL-MCNC: 9.8 MG/DL (ref 8.3–10.1)
CALCIUM SERPL-MCNC: 9 MG/DL (ref 8.3–10.1)
CHLORIDE SERPL-SCNC: 111 MMOL/L (ref 100–108)
CO2 SERPL-SCNC: 29 MMOL/L (ref 21–32)
CREAT SERPL-MCNC: 1.38 MG/DL (ref 0.6–1.3)
CRP SERPL QL: 29.7 MG/L
D DIMER PPP FEU-MCNC: 3.86 UG/ML FEU
EOSINOPHIL # BLD AUTO: 0.09 THOUSAND/ΜL (ref 0–0.61)
EOSINOPHIL NFR BLD AUTO: 2 % (ref 0–6)
ERYTHROCYTE [DISTWIDTH] IN BLOOD BY AUTOMATED COUNT: 14.9 % (ref 11.6–15.1)
GFR SERPL CREATININE-BSD FRML MDRD: 38 ML/MIN/1.73SQ M
GLUCOSE SERPL-MCNC: 125 MG/DL (ref 65–140)
GLUCOSE SERPL-MCNC: 154 MG/DL (ref 65–140)
GLUCOSE SERPL-MCNC: 177 MG/DL (ref 65–140)
GLUCOSE SERPL-MCNC: 251 MG/DL (ref 65–140)
GLUCOSE SERPL-MCNC: 99 MG/DL (ref 65–140)
HCT VFR BLD AUTO: 32.4 % (ref 34.8–46.1)
HGB BLD-MCNC: 9.7 G/DL (ref 11.5–15.4)
IMM GRANULOCYTES # BLD AUTO: 0.07 THOUSAND/UL (ref 0–0.2)
IMM GRANULOCYTES NFR BLD AUTO: 1 % (ref 0–2)
LYMPHOCYTES # BLD AUTO: 2.39 THOUSANDS/ΜL (ref 0.6–4.47)
LYMPHOCYTES NFR BLD AUTO: 40 % (ref 14–44)
MCH RBC QN AUTO: 32.8 PG (ref 26.8–34.3)
MCHC RBC AUTO-ENTMCNC: 29.9 G/DL (ref 31.4–37.4)
MCV RBC AUTO: 110 FL (ref 82–98)
MONOCYTES # BLD AUTO: 0.47 THOUSAND/ΜL (ref 0.17–1.22)
MONOCYTES NFR BLD AUTO: 8 % (ref 4–12)
NEUTROPHILS # BLD AUTO: 2.91 THOUSANDS/ΜL (ref 1.85–7.62)
NEUTS SEG NFR BLD AUTO: 48 % (ref 43–75)
NRBC BLD AUTO-RTO: 0 /100 WBCS
PLATELET # BLD AUTO: 189 THOUSANDS/UL (ref 149–390)
PMV BLD AUTO: 11.3 FL (ref 8.9–12.7)
POTASSIUM SERPL-SCNC: 3.8 MMOL/L (ref 3.5–5.3)
PROT SERPL-MCNC: 7.1 G/DL (ref 6.4–8.2)
RBC # BLD AUTO: 2.96 MILLION/UL (ref 3.81–5.12)
SODIUM SERPL-SCNC: 150 MMOL/L (ref 136–145)
WBC # BLD AUTO: 5.97 THOUSAND/UL (ref 4.31–10.16)

## 2021-07-21 PROCEDURE — 99233 SBSQ HOSP IP/OBS HIGH 50: CPT | Performed by: INTERNAL MEDICINE

## 2021-07-21 PROCEDURE — 93971 EXTREMITY STUDY: CPT | Performed by: SURGERY

## 2021-07-21 PROCEDURE — 85379 FIBRIN DEGRADATION QUANT: CPT | Performed by: INTERNAL MEDICINE

## 2021-07-21 PROCEDURE — 94664 DEMO&/EVAL PT USE INHALER: CPT

## 2021-07-21 PROCEDURE — 80053 COMPREHEN METABOLIC PANEL: CPT | Performed by: INTERNAL MEDICINE

## 2021-07-21 PROCEDURE — 86140 C-REACTIVE PROTEIN: CPT | Performed by: INTERNAL MEDICINE

## 2021-07-21 PROCEDURE — 85025 COMPLETE CBC W/AUTO DIFF WBC: CPT | Performed by: INTERNAL MEDICINE

## 2021-07-21 PROCEDURE — 71045 X-RAY EXAM CHEST 1 VIEW: CPT

## 2021-07-21 PROCEDURE — 82948 REAGENT STRIP/BLOOD GLUCOSE: CPT

## 2021-07-21 PROCEDURE — 94760 N-INVAS EAR/PLS OXIMETRY 1: CPT

## 2021-07-21 RX ORDER — DEXTROSE AND SODIUM CHLORIDE 5; .45 G/100ML; G/100ML
100 INJECTION, SOLUTION INTRAVENOUS CONTINUOUS
Status: DISCONTINUED | OUTPATIENT
Start: 2021-07-21 | End: 2021-07-21

## 2021-07-21 RX ORDER — FUROSEMIDE 10 MG/ML
20 INJECTION INTRAMUSCULAR; INTRAVENOUS ONCE
Status: COMPLETED | OUTPATIENT
Start: 2021-07-21 | End: 2021-07-21

## 2021-07-21 RX ORDER — DEXTROSE MONOHYDRATE 50 MG/ML
40 INJECTION, SOLUTION INTRAVENOUS CONTINUOUS
Status: DISCONTINUED | OUTPATIENT
Start: 2021-07-21 | End: 2021-07-21

## 2021-07-21 RX ORDER — LEVOFLOXACIN 5 MG/ML
750 INJECTION, SOLUTION INTRAVENOUS EVERY 24 HOURS
Status: DISCONTINUED | OUTPATIENT
Start: 2021-07-21 | End: 2021-07-21

## 2021-07-21 RX ORDER — LEVOFLOXACIN 5 MG/ML
750 INJECTION, SOLUTION INTRAVENOUS
Status: DISCONTINUED | OUTPATIENT
Start: 2021-07-21 | End: 2021-07-26

## 2021-07-21 RX ADMIN — LEVOFLOXACIN 750 MG: 5 INJECTION, SOLUTION INTRAVENOUS at 15:30

## 2021-07-21 RX ADMIN — METOPROLOL TARTRATE 100 MG: 50 TABLET, FILM COATED ORAL at 21:29

## 2021-07-21 RX ADMIN — Medication 2000 UNITS: at 09:44

## 2021-07-21 RX ADMIN — VALPROIC ACID 250 MG: 500 SOLUTION ORAL at 12:21

## 2021-07-21 RX ADMIN — FUROSEMIDE 20 MG: 10 INJECTION, SOLUTION INTRAMUSCULAR; INTRAVENOUS at 16:56

## 2021-07-21 RX ADMIN — DOXYCYCLINE 100 MG: 100 INJECTION, POWDER, LYOPHILIZED, FOR SOLUTION INTRAVENOUS at 10:10

## 2021-07-21 RX ADMIN — DEXAMETHASONE SODIUM PHOSPHATE 6 MG: 4 INJECTION INTRA-ARTICULAR; INTRALESIONAL; INTRAMUSCULAR; INTRAVENOUS; SOFT TISSUE at 09:44

## 2021-07-21 RX ADMIN — LEVOTHYROXINE SODIUM 100 MCG: 100 TABLET ORAL at 05:44

## 2021-07-21 RX ADMIN — CARBAMAZEPINE 100 MG: 100 TABLET, CHEWABLE ORAL at 16:56

## 2021-07-21 RX ADMIN — LEVOFLOXACIN 750 MG: 5 INJECTION, SOLUTION INTRAVENOUS at 16:07

## 2021-07-21 RX ADMIN — DIGOXIN 125 MCG: 125 TABLET ORAL at 09:43

## 2021-07-21 RX ADMIN — CARBAMAZEPINE 100 MG: 100 TABLET, CHEWABLE ORAL at 09:44

## 2021-07-21 RX ADMIN — FUROSEMIDE 20 MG: 10 INJECTION, SOLUTION INTRAMUSCULAR; INTRAVENOUS at 17:58

## 2021-07-21 RX ADMIN — CARBAMAZEPINE 100 MG: 100 TABLET, CHEWABLE ORAL at 21:29

## 2021-07-21 RX ADMIN — OXYCODONE HYDROCHLORIDE AND ACETAMINOPHEN 1000 MG: 500 TABLET ORAL at 21:29

## 2021-07-21 RX ADMIN — OXYCODONE HYDROCHLORIDE AND ACETAMINOPHEN 1000 MG: 500 TABLET ORAL at 09:43

## 2021-07-21 RX ADMIN — ATORVASTATIN CALCIUM 40 MG: 40 TABLET, FILM COATED ORAL at 21:29

## 2021-07-21 RX ADMIN — APIXABAN 5 MG: 5 TABLET, FILM COATED ORAL at 09:44

## 2021-07-21 RX ADMIN — SENNOSIDES A AND B 15 ML: 8.8 SYRUP ORAL at 09:47

## 2021-07-21 RX ADMIN — FAMOTIDINE 20 MG: 20 TABLET ORAL at 09:44

## 2021-07-21 RX ADMIN — REMDESIVIR 100 MG: 5 INJECTION INTRAVENOUS at 16:00

## 2021-07-21 RX ADMIN — DEXTROSE 75 ML/HR: 5 SOLUTION INTRAVENOUS at 15:25

## 2021-07-21 RX ADMIN — APIXABAN 10 MG: 5 TABLET, FILM COATED ORAL at 17:56

## 2021-07-21 RX ADMIN — VALPROIC ACID 250 MG: 500 SOLUTION ORAL at 21:29

## 2021-07-21 RX ADMIN — INSULIN LISPRO 3 UNITS: 100 INJECTION, SOLUTION INTRAVENOUS; SUBCUTANEOUS at 16:40

## 2021-07-21 RX ADMIN — INSULIN LISPRO 1 UNITS: 100 INJECTION, SOLUTION INTRAVENOUS; SUBCUTANEOUS at 12:22

## 2021-07-22 LAB
ANION GAP SERPL CALCULATED.3IONS-SCNC: 8 MMOL/L (ref 4–13)
BACTERIA UR CULT: ABNORMAL
BASOPHILS # BLD AUTO: 0.03 THOUSANDS/ΜL (ref 0–0.1)
BASOPHILS NFR BLD AUTO: 0 % (ref 0–1)
BUN SERPL-MCNC: 33 MG/DL (ref 5–25)
CALCIUM SERPL-MCNC: 9.1 MG/DL (ref 8.3–10.1)
CHLORIDE SERPL-SCNC: 109 MMOL/L (ref 100–108)
CO2 SERPL-SCNC: 29 MMOL/L (ref 21–32)
CREAT SERPL-MCNC: 1.32 MG/DL (ref 0.6–1.3)
D DIMER PPP FEU-MCNC: 3.75 UG/ML FEU
DIGOXIN SERPL-MCNC: 0.6 NG/ML (ref 0.8–2)
EOSINOPHIL # BLD AUTO: 0.1 THOUSAND/ΜL (ref 0–0.61)
EOSINOPHIL NFR BLD AUTO: 2 % (ref 0–6)
ERYTHROCYTE [DISTWIDTH] IN BLOOD BY AUTOMATED COUNT: 14.8 % (ref 11.6–15.1)
GFR SERPL CREATININE-BSD FRML MDRD: 40 ML/MIN/1.73SQ M
GLUCOSE SERPL-MCNC: 103 MG/DL (ref 65–140)
GLUCOSE SERPL-MCNC: 124 MG/DL (ref 65–140)
GLUCOSE SERPL-MCNC: 195 MG/DL (ref 65–140)
GLUCOSE SERPL-MCNC: 88 MG/DL (ref 65–140)
GLUCOSE SERPL-MCNC: 97 MG/DL (ref 65–140)
HCT VFR BLD AUTO: 31 % (ref 34.8–46.1)
HGB BLD-MCNC: 9.3 G/DL (ref 11.5–15.4)
IMM GRANULOCYTES # BLD AUTO: 0.07 THOUSAND/UL (ref 0–0.2)
IMM GRANULOCYTES NFR BLD AUTO: 1 % (ref 0–2)
LYMPHOCYTES # BLD AUTO: 2.65 THOUSANDS/ΜL (ref 0.6–4.47)
LYMPHOCYTES NFR BLD AUTO: 39 % (ref 14–44)
MCH RBC QN AUTO: 32.4 PG (ref 26.8–34.3)
MCHC RBC AUTO-ENTMCNC: 30 G/DL (ref 31.4–37.4)
MCV RBC AUTO: 108 FL (ref 82–98)
MONOCYTES # BLD AUTO: 0.66 THOUSAND/ΜL (ref 0.17–1.22)
MONOCYTES NFR BLD AUTO: 10 % (ref 4–12)
NEUTROPHILS # BLD AUTO: 3.31 THOUSANDS/ΜL (ref 1.85–7.62)
NEUTS SEG NFR BLD AUTO: 48 % (ref 43–75)
NRBC BLD AUTO-RTO: 0 /100 WBCS
PLATELET # BLD AUTO: 193 THOUSANDS/UL (ref 149–390)
PMV BLD AUTO: 10.8 FL (ref 8.9–12.7)
POTASSIUM SERPL-SCNC: 4 MMOL/L (ref 3.5–5.3)
PROCALCITONIN SERPL-MCNC: 0.26 NG/ML
RBC # BLD AUTO: 2.87 MILLION/UL (ref 3.81–5.12)
SODIUM SERPL-SCNC: 146 MMOL/L (ref 136–145)
WBC # BLD AUTO: 6.82 THOUSAND/UL (ref 4.31–10.16)

## 2021-07-22 PROCEDURE — 85379 FIBRIN DEGRADATION QUANT: CPT | Performed by: INTERNAL MEDICINE

## 2021-07-22 PROCEDURE — 82948 REAGENT STRIP/BLOOD GLUCOSE: CPT

## 2021-07-22 PROCEDURE — 80162 ASSAY OF DIGOXIN TOTAL: CPT | Performed by: INTERNAL MEDICINE

## 2021-07-22 PROCEDURE — 80048 BASIC METABOLIC PNL TOTAL CA: CPT | Performed by: INTERNAL MEDICINE

## 2021-07-22 PROCEDURE — 85025 COMPLETE CBC W/AUTO DIFF WBC: CPT | Performed by: INTERNAL MEDICINE

## 2021-07-22 PROCEDURE — 94664 DEMO&/EVAL PT USE INHALER: CPT

## 2021-07-22 PROCEDURE — 84145 PROCALCITONIN (PCT): CPT | Performed by: INTERNAL MEDICINE

## 2021-07-22 PROCEDURE — 99232 SBSQ HOSP IP/OBS MODERATE 35: CPT | Performed by: INTERNAL MEDICINE

## 2021-07-22 RX ORDER — METOPROLOL TARTRATE 50 MG/1
50 TABLET, FILM COATED ORAL EVERY 12 HOURS SCHEDULED
Status: DISCONTINUED | OUTPATIENT
Start: 2021-07-22 | End: 2021-07-23

## 2021-07-22 RX ORDER — POLYETHYLENE GLYCOL 3350 17 G/17G
17 POWDER, FOR SOLUTION ORAL DAILY
Status: DISCONTINUED | OUTPATIENT
Start: 2021-07-22 | End: 2021-07-27 | Stop reason: HOSPADM

## 2021-07-22 RX ORDER — FUROSEMIDE 20 MG/1
20 TABLET ORAL DAILY
Status: DISCONTINUED | OUTPATIENT
Start: 2021-07-22 | End: 2021-07-27 | Stop reason: HOSPADM

## 2021-07-22 RX ORDER — AMOXICILLIN 250 MG
1 CAPSULE ORAL
Status: DISCONTINUED | OUTPATIENT
Start: 2021-07-22 | End: 2021-07-27 | Stop reason: HOSPADM

## 2021-07-22 RX ADMIN — DOCUSATE SODIUM AND SENNOSIDES 1 TABLET: 8.6; 5 TABLET, FILM COATED ORAL at 22:16

## 2021-07-22 RX ADMIN — Medication 2000 UNITS: at 10:38

## 2021-07-22 RX ADMIN — DIGOXIN 125 MCG: 125 TABLET ORAL at 10:38

## 2021-07-22 RX ADMIN — VALPROIC ACID 250 MG: 500 SOLUTION ORAL at 11:09

## 2021-07-22 RX ADMIN — CARBAMAZEPINE 100 MG: 100 TABLET, CHEWABLE ORAL at 17:52

## 2021-07-22 RX ADMIN — DEXAMETHASONE SODIUM PHOSPHATE 6 MG: 4 INJECTION INTRA-ARTICULAR; INTRALESIONAL; INTRAMUSCULAR; INTRAVENOUS; SOFT TISSUE at 11:14

## 2021-07-22 RX ADMIN — METOPROLOL TARTRATE 50 MG: 50 TABLET, FILM COATED ORAL at 20:32

## 2021-07-22 RX ADMIN — CARBAMAZEPINE 100 MG: 100 TABLET, CHEWABLE ORAL at 10:38

## 2021-07-22 RX ADMIN — ATORVASTATIN CALCIUM 40 MG: 40 TABLET, FILM COATED ORAL at 22:16

## 2021-07-22 RX ADMIN — OXYCODONE HYDROCHLORIDE AND ACETAMINOPHEN 1000 MG: 500 TABLET ORAL at 10:38

## 2021-07-22 RX ADMIN — APIXABAN 10 MG: 5 TABLET, FILM COATED ORAL at 10:38

## 2021-07-22 RX ADMIN — SENNOSIDES A AND B 15 ML: 8.8 SYRUP ORAL at 10:40

## 2021-07-22 RX ADMIN — INSULIN LISPRO 2 UNITS: 100 INJECTION, SOLUTION INTRAVENOUS; SUBCUTANEOUS at 17:00

## 2021-07-22 RX ADMIN — APIXABAN 10 MG: 5 TABLET, FILM COATED ORAL at 17:51

## 2021-07-22 RX ADMIN — VALPROIC ACID 250 MG: 500 SOLUTION ORAL at 20:32

## 2021-07-22 RX ADMIN — REMDESIVIR 100 MG: 5 INJECTION INTRAVENOUS at 16:00

## 2021-07-22 RX ADMIN — FUROSEMIDE 20 MG: 20 TABLET ORAL at 13:40

## 2021-07-22 RX ADMIN — CARBAMAZEPINE 100 MG: 100 TABLET, CHEWABLE ORAL at 20:32

## 2021-07-22 RX ADMIN — FAMOTIDINE 20 MG: 20 TABLET ORAL at 10:38

## 2021-07-22 RX ADMIN — LEVOTHYROXINE SODIUM 100 MCG: 100 TABLET ORAL at 05:18

## 2021-07-22 RX ADMIN — OXYCODONE HYDROCHLORIDE AND ACETAMINOPHEN 1000 MG: 500 TABLET ORAL at 20:32

## 2021-07-23 PROBLEM — J96.01 ACUTE RESPIRATORY FAILURE WITH HYPOXIA (HCC): Status: RESOLVED | Noted: 2018-06-18 | Resolved: 2021-07-23

## 2021-07-23 LAB
ALBUMIN SERPL BCP-MCNC: 2.8 G/DL (ref 3.5–5)
ALP SERPL-CCNC: 60 U/L (ref 46–116)
ALT SERPL W P-5'-P-CCNC: 14 U/L (ref 12–78)
ANION GAP SERPL CALCULATED.3IONS-SCNC: 7 MMOL/L (ref 4–13)
AST SERPL W P-5'-P-CCNC: 13 U/L (ref 5–45)
BASOPHILS # BLD AUTO: 0.02 THOUSANDS/ΜL (ref 0–0.1)
BASOPHILS NFR BLD AUTO: 0 % (ref 0–1)
BILIRUB SERPL-MCNC: 0.35 MG/DL (ref 0.2–1)
BUN SERPL-MCNC: 34 MG/DL (ref 5–25)
CALCIUM ALBUM COR SERPL-MCNC: 10.3 MG/DL (ref 8.3–10.1)
CALCIUM SERPL-MCNC: 9.3 MG/DL (ref 8.3–10.1)
CHLORIDE SERPL-SCNC: 108 MMOL/L (ref 100–108)
CO2 SERPL-SCNC: 32 MMOL/L (ref 21–32)
CREAT SERPL-MCNC: 1.25 MG/DL (ref 0.6–1.3)
D DIMER PPP FEU-MCNC: 3.14 UG/ML FEU
EOSINOPHIL # BLD AUTO: 0.05 THOUSAND/ΜL (ref 0–0.61)
EOSINOPHIL NFR BLD AUTO: 1 % (ref 0–6)
ERYTHROCYTE [DISTWIDTH] IN BLOOD BY AUTOMATED COUNT: 14.6 % (ref 11.6–15.1)
GFR SERPL CREATININE-BSD FRML MDRD: 42 ML/MIN/1.73SQ M
GLUCOSE SERPL-MCNC: 120 MG/DL (ref 65–140)
GLUCOSE SERPL-MCNC: 131 MG/DL (ref 65–140)
GLUCOSE SERPL-MCNC: 183 MG/DL (ref 65–140)
GLUCOSE SERPL-MCNC: 194 MG/DL (ref 65–140)
GLUCOSE SERPL-MCNC: 98 MG/DL (ref 65–140)
HCT VFR BLD AUTO: 30.6 % (ref 34.8–46.1)
HGB BLD-MCNC: 9.5 G/DL (ref 11.5–15.4)
IMM GRANULOCYTES # BLD AUTO: 0.06 THOUSAND/UL (ref 0–0.2)
IMM GRANULOCYTES NFR BLD AUTO: 1 % (ref 0–2)
LYMPHOCYTES # BLD AUTO: 2.51 THOUSANDS/ΜL (ref 0.6–4.47)
LYMPHOCYTES NFR BLD AUTO: 44 % (ref 14–44)
MCH RBC QN AUTO: 33.3 PG (ref 26.8–34.3)
MCHC RBC AUTO-ENTMCNC: 31 G/DL (ref 31.4–37.4)
MCV RBC AUTO: 107 FL (ref 82–98)
MONOCYTES # BLD AUTO: 0.57 THOUSAND/ΜL (ref 0.17–1.22)
MONOCYTES NFR BLD AUTO: 10 % (ref 4–12)
NEUTROPHILS # BLD AUTO: 2.51 THOUSANDS/ΜL (ref 1.85–7.62)
NEUTS SEG NFR BLD AUTO: 44 % (ref 43–75)
NRBC BLD AUTO-RTO: 0 /100 WBCS
PLATELET # BLD AUTO: 191 THOUSANDS/UL (ref 149–390)
PMV BLD AUTO: 11.3 FL (ref 8.9–12.7)
POTASSIUM SERPL-SCNC: 4 MMOL/L (ref 3.5–5.3)
PROCALCITONIN SERPL-MCNC: 0.2 NG/ML
PROT SERPL-MCNC: 6.5 G/DL (ref 6.4–8.2)
RBC # BLD AUTO: 2.85 MILLION/UL (ref 3.81–5.12)
SODIUM SERPL-SCNC: 147 MMOL/L (ref 136–145)
WBC # BLD AUTO: 5.72 THOUSAND/UL (ref 4.31–10.16)

## 2021-07-23 PROCEDURE — 85379 FIBRIN DEGRADATION QUANT: CPT | Performed by: INTERNAL MEDICINE

## 2021-07-23 PROCEDURE — 82948 REAGENT STRIP/BLOOD GLUCOSE: CPT

## 2021-07-23 PROCEDURE — 84145 PROCALCITONIN (PCT): CPT | Performed by: INTERNAL MEDICINE

## 2021-07-23 PROCEDURE — 99232 SBSQ HOSP IP/OBS MODERATE 35: CPT | Performed by: INTERNAL MEDICINE

## 2021-07-23 PROCEDURE — 80053 COMPREHEN METABOLIC PANEL: CPT | Performed by: INTERNAL MEDICINE

## 2021-07-23 PROCEDURE — 85025 COMPLETE CBC W/AUTO DIFF WBC: CPT | Performed by: INTERNAL MEDICINE

## 2021-07-23 PROCEDURE — 94664 DEMO&/EVAL PT USE INHALER: CPT

## 2021-07-23 RX ADMIN — OXYCODONE HYDROCHLORIDE AND ACETAMINOPHEN 1000 MG: 500 TABLET ORAL at 10:45

## 2021-07-23 RX ADMIN — FAMOTIDINE 20 MG: 20 TABLET ORAL at 10:45

## 2021-07-23 RX ADMIN — CARBAMAZEPINE 100 MG: 100 TABLET, CHEWABLE ORAL at 22:24

## 2021-07-23 RX ADMIN — SENNOSIDES A AND B 15 ML: 8.8 SYRUP ORAL at 11:12

## 2021-07-23 RX ADMIN — ATORVASTATIN CALCIUM 40 MG: 40 TABLET, FILM COATED ORAL at 22:24

## 2021-07-23 RX ADMIN — DEXTROSE 500 ML: 5 SOLUTION INTRAVENOUS at 16:01

## 2021-07-23 RX ADMIN — OXYCODONE HYDROCHLORIDE AND ACETAMINOPHEN 1000 MG: 500 TABLET ORAL at 22:24

## 2021-07-23 RX ADMIN — CARBAMAZEPINE 100 MG: 100 TABLET, CHEWABLE ORAL at 16:07

## 2021-07-23 RX ADMIN — LEVOTHYROXINE SODIUM 100 MCG: 100 TABLET ORAL at 05:08

## 2021-07-23 RX ADMIN — APIXABAN 10 MG: 5 TABLET, FILM COATED ORAL at 10:45

## 2021-07-23 RX ADMIN — FUROSEMIDE 20 MG: 20 TABLET ORAL at 11:11

## 2021-07-23 RX ADMIN — INSULIN LISPRO 1 UNITS: 100 INJECTION, SOLUTION INTRAVENOUS; SUBCUTANEOUS at 16:18

## 2021-07-23 RX ADMIN — DEXAMETHASONE SODIUM PHOSPHATE 6 MG: 4 INJECTION INTRA-ARTICULAR; INTRALESIONAL; INTRAMUSCULAR; INTRAVENOUS; SOFT TISSUE at 10:45

## 2021-07-23 RX ADMIN — LEVOFLOXACIN 750 MG: 5 INJECTION, SOLUTION INTRAVENOUS at 16:04

## 2021-07-23 RX ADMIN — APIXABAN 10 MG: 5 TABLET, FILM COATED ORAL at 16:08

## 2021-07-23 RX ADMIN — POLYETHYLENE GLYCOL 3350 17 G: 17 POWDER, FOR SOLUTION ORAL at 11:11

## 2021-07-23 RX ADMIN — DOCUSATE SODIUM AND SENNOSIDES 1 TABLET: 8.6; 5 TABLET, FILM COATED ORAL at 22:24

## 2021-07-23 RX ADMIN — VALPROIC ACID 250 MG: 500 SOLUTION ORAL at 13:47

## 2021-07-23 RX ADMIN — CARBAMAZEPINE 100 MG: 100 TABLET, CHEWABLE ORAL at 10:45

## 2021-07-23 RX ADMIN — Medication 2000 UNITS: at 10:45

## 2021-07-23 RX ADMIN — INSULIN LISPRO 2 UNITS: 100 INJECTION, SOLUTION INTRAVENOUS; SUBCUTANEOUS at 13:55

## 2021-07-23 RX ADMIN — DIGOXIN 125 MCG: 125 TABLET ORAL at 10:45

## 2021-07-24 PROBLEM — E87.0 HYPERNATREMIA: Status: RESOLVED | Noted: 2018-04-13 | Resolved: 2021-07-24

## 2021-07-24 LAB
ANION GAP SERPL CALCULATED.3IONS-SCNC: 7 MMOL/L (ref 4–13)
BACTERIA BLD CULT: NORMAL
BACTERIA BLD CULT: NORMAL
BASOPHILS # BLD AUTO: 0.03 THOUSANDS/ΜL (ref 0–0.1)
BASOPHILS NFR BLD AUTO: 1 % (ref 0–1)
BUN SERPL-MCNC: 37 MG/DL (ref 5–25)
CALCIUM SERPL-MCNC: 8.9 MG/DL (ref 8.3–10.1)
CHLORIDE SERPL-SCNC: 108 MMOL/L (ref 100–108)
CO2 SERPL-SCNC: 29 MMOL/L (ref 21–32)
CREAT SERPL-MCNC: 1.25 MG/DL (ref 0.6–1.3)
EOSINOPHIL # BLD AUTO: 0.08 THOUSAND/ΜL (ref 0–0.61)
EOSINOPHIL NFR BLD AUTO: 1 % (ref 0–6)
ERYTHROCYTE [DISTWIDTH] IN BLOOD BY AUTOMATED COUNT: 14.5 % (ref 11.6–15.1)
GFR SERPL CREATININE-BSD FRML MDRD: 42 ML/MIN/1.73SQ M
GLUCOSE SERPL-MCNC: 138 MG/DL (ref 65–140)
GLUCOSE SERPL-MCNC: 184 MG/DL (ref 65–140)
GLUCOSE SERPL-MCNC: 211 MG/DL (ref 65–140)
GLUCOSE SERPL-MCNC: 85 MG/DL (ref 65–140)
GLUCOSE SERPL-MCNC: 94 MG/DL (ref 65–140)
HCT VFR BLD AUTO: 30.4 % (ref 34.8–46.1)
HGB BLD-MCNC: 9.5 G/DL (ref 11.5–15.4)
IMM GRANULOCYTES # BLD AUTO: 0.07 THOUSAND/UL (ref 0–0.2)
IMM GRANULOCYTES NFR BLD AUTO: 1 % (ref 0–2)
LYMPHOCYTES # BLD AUTO: 2.26 THOUSANDS/ΜL (ref 0.6–4.47)
LYMPHOCYTES NFR BLD AUTO: 36 % (ref 14–44)
MCH RBC QN AUTO: 33.3 PG (ref 26.8–34.3)
MCHC RBC AUTO-ENTMCNC: 31.3 G/DL (ref 31.4–37.4)
MCV RBC AUTO: 107 FL (ref 82–98)
MONOCYTES # BLD AUTO: 0.59 THOUSAND/ΜL (ref 0.17–1.22)
MONOCYTES NFR BLD AUTO: 9 % (ref 4–12)
NEUTROPHILS # BLD AUTO: 3.33 THOUSANDS/ΜL (ref 1.85–7.62)
NEUTS SEG NFR BLD AUTO: 52 % (ref 43–75)
NRBC BLD AUTO-RTO: 0 /100 WBCS
PLATELET # BLD AUTO: 178 THOUSANDS/UL (ref 149–390)
PMV BLD AUTO: 11.5 FL (ref 8.9–12.7)
POTASSIUM SERPL-SCNC: 3.9 MMOL/L (ref 3.5–5.3)
RBC # BLD AUTO: 2.85 MILLION/UL (ref 3.81–5.12)
SODIUM SERPL-SCNC: 144 MMOL/L (ref 136–145)
WBC # BLD AUTO: 6.36 THOUSAND/UL (ref 4.31–10.16)

## 2021-07-24 PROCEDURE — 99239 HOSP IP/OBS DSCHRG MGMT >30: CPT | Performed by: INTERNAL MEDICINE

## 2021-07-24 PROCEDURE — 82948 REAGENT STRIP/BLOOD GLUCOSE: CPT

## 2021-07-24 PROCEDURE — 80048 BASIC METABOLIC PNL TOTAL CA: CPT | Performed by: INTERNAL MEDICINE

## 2021-07-24 PROCEDURE — 85025 COMPLETE CBC W/AUTO DIFF WBC: CPT | Performed by: INTERNAL MEDICINE

## 2021-07-24 RX ORDER — LEVOFLOXACIN 250 MG/1
250 TABLET ORAL
Qty: 3 TABLET | Refills: 0
Start: 2021-07-24 | End: 2021-07-26 | Stop reason: HOSPADM

## 2021-07-24 RX ORDER — POLYETHYLENE GLYCOL 3350 17 G/17G
17 POWDER, FOR SOLUTION ORAL DAILY
Refills: 0
Start: 2021-07-25

## 2021-07-24 RX ORDER — LEVOFLOXACIN 5 MG/ML
750 INJECTION, SOLUTION INTRAVENOUS
Qty: 750 ML | Refills: 0 | Status: CANCELLED
Start: 2021-07-25 | End: 2021-08-03

## 2021-07-24 RX ADMIN — CARBAMAZEPINE 100 MG: 100 TABLET, CHEWABLE ORAL at 21:50

## 2021-07-24 RX ADMIN — DEXAMETHASONE SODIUM PHOSPHATE 6 MG: 4 INJECTION INTRA-ARTICULAR; INTRALESIONAL; INTRAMUSCULAR; INTRAVENOUS; SOFT TISSUE at 08:56

## 2021-07-24 RX ADMIN — ATORVASTATIN CALCIUM 40 MG: 40 TABLET, FILM COATED ORAL at 21:50

## 2021-07-24 RX ADMIN — LEVOTHYROXINE SODIUM 100 MCG: 100 TABLET ORAL at 05:49

## 2021-07-24 RX ADMIN — OXYCODONE HYDROCHLORIDE AND ACETAMINOPHEN 1000 MG: 500 TABLET ORAL at 21:50

## 2021-07-24 RX ADMIN — METOPROLOL TARTRATE 25 MG: 25 TABLET, FILM COATED ORAL at 21:50

## 2021-07-24 RX ADMIN — INSULIN LISPRO 2 UNITS: 100 INJECTION, SOLUTION INTRAVENOUS; SUBCUTANEOUS at 18:01

## 2021-07-24 RX ADMIN — VALPROIC ACID 250 MG: 500 SOLUTION ORAL at 21:56

## 2021-07-24 RX ADMIN — APIXABAN 10 MG: 5 TABLET, FILM COATED ORAL at 18:00

## 2021-07-24 RX ADMIN — DOCUSATE SODIUM AND SENNOSIDES 1 TABLET: 8.6; 5 TABLET, FILM COATED ORAL at 21:50

## 2021-07-24 RX ADMIN — CARBAMAZEPINE 100 MG: 100 TABLET, CHEWABLE ORAL at 18:00

## 2021-07-25 LAB
GLUCOSE SERPL-MCNC: 103 MG/DL (ref 65–140)
GLUCOSE SERPL-MCNC: 157 MG/DL (ref 65–140)
GLUCOSE SERPL-MCNC: 178 MG/DL (ref 65–140)
GLUCOSE SERPL-MCNC: 179 MG/DL (ref 65–140)
GLUCOSE SERPL-MCNC: 97 MG/DL (ref 65–140)

## 2021-07-25 PROCEDURE — 99232 SBSQ HOSP IP/OBS MODERATE 35: CPT | Performed by: INTERNAL MEDICINE

## 2021-07-25 PROCEDURE — 94664 DEMO&/EVAL PT USE INHALER: CPT

## 2021-07-25 PROCEDURE — 82948 REAGENT STRIP/BLOOD GLUCOSE: CPT

## 2021-07-25 RX ADMIN — METOPROLOL TARTRATE 25 MG: 25 TABLET, FILM COATED ORAL at 21:41

## 2021-07-25 RX ADMIN — APIXABAN 10 MG: 5 TABLET, FILM COATED ORAL at 09:17

## 2021-07-25 RX ADMIN — ATORVASTATIN CALCIUM 40 MG: 40 TABLET, FILM COATED ORAL at 21:41

## 2021-07-25 RX ADMIN — CARBAMAZEPINE 100 MG: 100 TABLET, CHEWABLE ORAL at 09:17

## 2021-07-25 RX ADMIN — LEVOFLOXACIN 750 MG: 5 INJECTION, SOLUTION INTRAVENOUS at 14:20

## 2021-07-25 RX ADMIN — DIGOXIN 125 MCG: 125 TABLET ORAL at 09:17

## 2021-07-25 RX ADMIN — OXYCODONE HYDROCHLORIDE AND ACETAMINOPHEN 1000 MG: 500 TABLET ORAL at 09:17

## 2021-07-25 RX ADMIN — DOCUSATE SODIUM AND SENNOSIDES 1 TABLET: 8.6; 5 TABLET, FILM COATED ORAL at 21:41

## 2021-07-25 RX ADMIN — OXYCODONE HYDROCHLORIDE AND ACETAMINOPHEN 1000 MG: 500 TABLET ORAL at 21:41

## 2021-07-25 RX ADMIN — LEVOTHYROXINE SODIUM 100 MCG: 100 TABLET ORAL at 06:03

## 2021-07-25 RX ADMIN — INSULIN LISPRO 1 UNITS: 100 INJECTION, SOLUTION INTRAVENOUS; SUBCUTANEOUS at 12:00

## 2021-07-25 RX ADMIN — Medication 2000 UNITS: at 09:17

## 2021-07-25 RX ADMIN — DEXAMETHASONE SODIUM PHOSPHATE 6 MG: 4 INJECTION INTRA-ARTICULAR; INTRALESIONAL; INTRAMUSCULAR; INTRAVENOUS; SOFT TISSUE at 09:17

## 2021-07-25 RX ADMIN — APIXABAN 10 MG: 5 TABLET, FILM COATED ORAL at 17:17

## 2021-07-25 RX ADMIN — CARBAMAZEPINE 100 MG: 100 TABLET, CHEWABLE ORAL at 17:17

## 2021-07-25 RX ADMIN — INSULIN LISPRO 1 UNITS: 100 INJECTION, SOLUTION INTRAVENOUS; SUBCUTANEOUS at 17:25

## 2021-07-25 RX ADMIN — VALPROIC ACID 250 MG: 500 SOLUTION ORAL at 21:42

## 2021-07-25 RX ADMIN — CARBAMAZEPINE 100 MG: 100 TABLET, CHEWABLE ORAL at 21:41

## 2021-07-25 RX ADMIN — POLYETHYLENE GLYCOL 3350 17 G: 17 POWDER, FOR SOLUTION ORAL at 09:18

## 2021-07-25 RX ADMIN — SENNOSIDES A AND B 15 ML: 8.8 SYRUP ORAL at 09:22

## 2021-07-25 RX ADMIN — VALPROIC ACID 250 MG: 500 SOLUTION ORAL at 10:04

## 2021-07-25 RX ADMIN — FUROSEMIDE 20 MG: 20 TABLET ORAL at 09:17

## 2021-07-25 RX ADMIN — FAMOTIDINE 20 MG: 20 TABLET ORAL at 09:17

## 2021-07-25 RX ADMIN — METOPROLOL TARTRATE 25 MG: 25 TABLET, FILM COATED ORAL at 09:17

## 2021-07-26 LAB
GLUCOSE SERPL-MCNC: 109 MG/DL (ref 65–140)
GLUCOSE SERPL-MCNC: 113 MG/DL (ref 65–140)
GLUCOSE SERPL-MCNC: 137 MG/DL (ref 65–140)
GLUCOSE SERPL-MCNC: 178 MG/DL (ref 65–140)

## 2021-07-26 PROCEDURE — 82948 REAGENT STRIP/BLOOD GLUCOSE: CPT

## 2021-07-26 PROCEDURE — 99232 SBSQ HOSP IP/OBS MODERATE 35: CPT | Performed by: INTERNAL MEDICINE

## 2021-07-26 RX ADMIN — VALPROIC ACID 250 MG: 500 SOLUTION ORAL at 09:34

## 2021-07-26 RX ADMIN — VALPROIC ACID 250 MG: 500 SOLUTION ORAL at 21:08

## 2021-07-26 RX ADMIN — CARBAMAZEPINE 100 MG: 100 TABLET, CHEWABLE ORAL at 16:45

## 2021-07-26 RX ADMIN — DOCUSATE SODIUM AND SENNOSIDES 1 TABLET: 8.6; 5 TABLET, FILM COATED ORAL at 21:07

## 2021-07-26 RX ADMIN — DIGOXIN 125 MCG: 125 TABLET ORAL at 09:19

## 2021-07-26 RX ADMIN — CARBAMAZEPINE 100 MG: 100 TABLET, CHEWABLE ORAL at 21:07

## 2021-07-26 RX ADMIN — Medication 2000 UNITS: at 09:20

## 2021-07-26 RX ADMIN — APIXABAN 10 MG: 5 TABLET, FILM COATED ORAL at 17:55

## 2021-07-26 RX ADMIN — METOPROLOL TARTRATE 25 MG: 25 TABLET, FILM COATED ORAL at 09:20

## 2021-07-26 RX ADMIN — POLYETHYLENE GLYCOL 3350 17 G: 17 POWDER, FOR SOLUTION ORAL at 09:20

## 2021-07-26 RX ADMIN — APIXABAN 10 MG: 5 TABLET, FILM COATED ORAL at 09:29

## 2021-07-26 RX ADMIN — LEVOTHYROXINE SODIUM 100 MCG: 100 TABLET ORAL at 05:32

## 2021-07-26 RX ADMIN — INSULIN LISPRO 1 UNITS: 100 INJECTION, SOLUTION INTRAVENOUS; SUBCUTANEOUS at 16:47

## 2021-07-26 RX ADMIN — SENNOSIDES A AND B 15 ML: 8.8 SYRUP ORAL at 09:22

## 2021-07-26 RX ADMIN — FAMOTIDINE 20 MG: 20 TABLET ORAL at 09:21

## 2021-07-26 RX ADMIN — FUROSEMIDE 20 MG: 20 TABLET ORAL at 09:20

## 2021-07-26 RX ADMIN — CARBAMAZEPINE 100 MG: 100 TABLET, CHEWABLE ORAL at 09:20

## 2021-07-26 RX ADMIN — ATORVASTATIN CALCIUM 40 MG: 40 TABLET, FILM COATED ORAL at 21:07

## 2021-07-26 RX ADMIN — DEXAMETHASONE SODIUM PHOSPHATE 6 MG: 4 INJECTION INTRA-ARTICULAR; INTRALESIONAL; INTRAMUSCULAR; INTRAVENOUS; SOFT TISSUE at 09:21

## 2021-07-27 VITALS
RESPIRATION RATE: 16 BRPM | HEART RATE: 61 BPM | HEIGHT: 63 IN | OXYGEN SATURATION: 92 % | WEIGHT: 158.73 LBS | DIASTOLIC BLOOD PRESSURE: 63 MMHG | BODY MASS INDEX: 28.12 KG/M2 | SYSTOLIC BLOOD PRESSURE: 135 MMHG | TEMPERATURE: 98.2 F

## 2021-07-27 LAB
GLUCOSE SERPL-MCNC: 118 MG/DL (ref 65–140)
GLUCOSE SERPL-MCNC: 150 MG/DL (ref 65–140)

## 2021-07-27 PROCEDURE — 99239 HOSP IP/OBS DSCHRG MGMT >30: CPT | Performed by: STUDENT IN AN ORGANIZED HEALTH CARE EDUCATION/TRAINING PROGRAM

## 2021-07-27 PROCEDURE — 82948 REAGENT STRIP/BLOOD GLUCOSE: CPT

## 2021-07-27 RX ADMIN — FUROSEMIDE 20 MG: 20 TABLET ORAL at 10:00

## 2021-07-27 RX ADMIN — POLYETHYLENE GLYCOL 3350 17 G: 17 POWDER, FOR SOLUTION ORAL at 10:00

## 2021-07-27 RX ADMIN — Medication 2000 UNITS: at 10:00

## 2021-07-27 RX ADMIN — SENNOSIDES A AND B 15 ML: 8.8 SYRUP ORAL at 10:00

## 2021-07-27 RX ADMIN — DIGOXIN 125 MCG: 125 TABLET ORAL at 10:00

## 2021-07-27 RX ADMIN — INSULIN LISPRO 1 UNITS: 100 INJECTION, SOLUTION INTRAVENOUS; SUBCUTANEOUS at 11:18

## 2021-07-27 RX ADMIN — LEVOTHYROXINE SODIUM 100 MCG: 100 TABLET ORAL at 05:54

## 2021-07-27 RX ADMIN — METOPROLOL TARTRATE 25 MG: 25 TABLET, FILM COATED ORAL at 10:00

## 2021-07-27 RX ADMIN — CARBAMAZEPINE 100 MG: 100 TABLET, CHEWABLE ORAL at 10:00

## 2021-07-27 RX ADMIN — VALPROIC ACID 250 MG: 500 SOLUTION ORAL at 10:00

## 2021-07-27 RX ADMIN — APIXABAN 10 MG: 5 TABLET, FILM COATED ORAL at 10:00

## 2021-07-27 NOTE — TELEPHONE ENCOUNTER
Patient daughter Pancho Landaverde is requesting to have the consult with Dr Sav Edwards rescheduled, states that patient is currently at a rehab facility and positive for doe Kaur would like to confirm if the consult can take place 8/9 or 8/10 and be done virtually  Best call back [phone]

## 2021-07-28 ENCOUNTER — TELEPHONE (OUTPATIENT)
Dept: HEMATOLOGY ONCOLOGY | Facility: CLINIC | Age: 76
End: 2021-07-28

## 2021-07-28 NOTE — TELEPHONE ENCOUNTER
Asia Sherman returned call regarding Consult  Asia Sherman would like to do a virtual visit   Asia Sherman has not received a answer regarding Asia Sherman would like for [facility] to be emailed link as well as herself at [email or web address]  [facility] can be reached at [phone]   Asia Sherman can be reached at [phone]

## 2021-08-05 ENCOUNTER — TELEPHONE (OUTPATIENT)
Dept: HEMATOLOGY ONCOLOGY | Facility: CLINIC | Age: 76
End: 2021-08-05

## 2021-08-05 NOTE — TELEPHONE ENCOUNTER
I phoned the patient's daughter, Renny Roberson, regarding the patient's appointment with Dr Rachelle Aquino on 8/9/21  Per the notes in the patient's chart, it appears that the daughter was inquiring about a virtual visit for the consult  I left a voicemail message on Shirley's voicemail indicating that, per Dr Rachelle Aquino, he would prefer to see the patient in person  I asked that Renny Roberson phone the office back so that we would be able to schedule this consult as soon as possible after her discharge from the rehab hospital  The [employer] number was provided

## 2021-08-06 ENCOUNTER — TELEPHONE (OUTPATIENT)
Dept: HEMATOLOGY ONCOLOGY | Facility: CLINIC | Age: 76
End: 2021-08-06

## 2021-08-06 NOTE — TELEPHONE ENCOUNTER
Appointment Confirmation     Appointment with  Dr Jose Lucero   Appointment date & time 08/09 at 9:40am   Location [facility]   Patient verbilized Understanding  yes

## 2021-08-07 ENCOUNTER — HOSPITAL ENCOUNTER (INPATIENT)
Facility: HOSPITAL | Age: 76
LOS: 1 days | Discharge: NURSING, REHAB OR LONG-TERM CARE | DRG: 699 | End: 2021-08-09
Attending: EMERGENCY MEDICINE | Admitting: INTERNAL MEDICINE
Payer: MEDICARE

## 2021-08-07 DIAGNOSIS — I50.33 ACUTE ON CHRONIC DIASTOLIC CONGESTIVE HEART FAILURE (HCC): ICD-10-CM

## 2021-08-07 DIAGNOSIS — R31.9 HEMATURIA, UNSPECIFIED TYPE: Primary | ICD-10-CM

## 2021-08-07 DIAGNOSIS — C67.9 BLADDER CANCER (HCC): ICD-10-CM

## 2021-08-07 DIAGNOSIS — T83.9XXA PROBLEM WITH FOLEY CATHETER, INITIAL ENCOUNTER (HCC): ICD-10-CM

## 2021-08-07 DIAGNOSIS — R31.0 GROSS HEMATURIA: ICD-10-CM

## 2021-08-07 LAB
ANION GAP SERPL CALCULATED.3IONS-SCNC: 7 MMOL/L (ref 4–13)
APTT PPP: 29 SECONDS (ref 23–37)
BASOPHILS # BLD AUTO: 0.04 THOUSANDS/ΜL (ref 0–0.1)
BASOPHILS NFR BLD AUTO: 1 % (ref 0–1)
BUN SERPL-MCNC: 18 MG/DL (ref 5–25)
CALCIUM SERPL-MCNC: 8.8 MG/DL (ref 8.3–10.1)
CHLORIDE SERPL-SCNC: 109 MMOL/L (ref 100–108)
CO2 SERPL-SCNC: 28 MMOL/L (ref 21–32)
CREAT SERPL-MCNC: 1.12 MG/DL (ref 0.6–1.3)
EOSINOPHIL # BLD AUTO: 0.25 THOUSAND/ΜL (ref 0–0.61)
EOSINOPHIL NFR BLD AUTO: 5 % (ref 0–6)
ERYTHROCYTE [DISTWIDTH] IN BLOOD BY AUTOMATED COUNT: 14.2 % (ref 11.6–15.1)
GFR SERPL CREATININE-BSD FRML MDRD: 49 ML/MIN/1.73SQ M
GLUCOSE SERPL-MCNC: 113 MG/DL (ref 65–140)
HCT VFR BLD AUTO: 31.5 % (ref 34.8–46.1)
HGB BLD-MCNC: 9.7 G/DL (ref 11.5–15.4)
IMM GRANULOCYTES # BLD AUTO: 0.06 THOUSAND/UL (ref 0–0.2)
IMM GRANULOCYTES NFR BLD AUTO: 1 % (ref 0–2)
INR PPP: 1.11 (ref 0.84–1.19)
LYMPHOCYTES # BLD AUTO: 2.3 THOUSANDS/ΜL (ref 0.6–4.47)
LYMPHOCYTES NFR BLD AUTO: 42 % (ref 14–44)
MCH RBC QN AUTO: 32.3 PG (ref 26.8–34.3)
MCHC RBC AUTO-ENTMCNC: 30.8 G/DL (ref 31.4–37.4)
MCV RBC AUTO: 105 FL (ref 82–98)
MONOCYTES # BLD AUTO: 0.47 THOUSAND/ΜL (ref 0.17–1.22)
MONOCYTES NFR BLD AUTO: 9 % (ref 4–12)
NEUTROPHILS # BLD AUTO: 2.35 THOUSANDS/ΜL (ref 1.85–7.62)
NEUTS SEG NFR BLD AUTO: 42 % (ref 43–75)
NRBC BLD AUTO-RTO: 0 /100 WBCS
PLATELET # BLD AUTO: 161 THOUSANDS/UL (ref 149–390)
PMV BLD AUTO: 12.1 FL (ref 8.9–12.7)
POTASSIUM SERPL-SCNC: 5.1 MMOL/L (ref 3.5–5.3)
PROTHROMBIN TIME: 14.6 SECONDS (ref 11.6–14.5)
RBC # BLD AUTO: 3 MILLION/UL (ref 3.81–5.12)
SODIUM SERPL-SCNC: 144 MMOL/L (ref 136–145)
WBC # BLD AUTO: 5.47 THOUSAND/UL (ref 4.31–10.16)

## 2021-08-07 PROCEDURE — 99220 PR INITIAL OBSERVATION CARE/DAY 70 MINUTES: CPT | Performed by: INTERNAL MEDICINE

## 2021-08-07 PROCEDURE — 85025 COMPLETE CBC W/AUTO DIFF WBC: CPT | Performed by: EMERGENCY MEDICINE

## 2021-08-07 PROCEDURE — 99284 EMERGENCY DEPT VISIT MOD MDM: CPT

## 2021-08-07 PROCEDURE — 36415 COLL VENOUS BLD VENIPUNCTURE: CPT | Performed by: EMERGENCY MEDICINE

## 2021-08-07 PROCEDURE — 80048 BASIC METABOLIC PNL TOTAL CA: CPT | Performed by: EMERGENCY MEDICINE

## 2021-08-07 PROCEDURE — 85610 PROTHROMBIN TIME: CPT | Performed by: EMERGENCY MEDICINE

## 2021-08-07 PROCEDURE — 85730 THROMBOPLASTIN TIME PARTIAL: CPT | Performed by: EMERGENCY MEDICINE

## 2021-08-07 PROCEDURE — 99285 EMERGENCY DEPT VISIT HI MDM: CPT | Performed by: EMERGENCY MEDICINE

## 2021-08-07 RX ORDER — CARBAMAZEPINE 100 MG/1
100 TABLET, CHEWABLE ORAL 3 TIMES DAILY
Status: DISCONTINUED | OUTPATIENT
Start: 2021-08-07 | End: 2021-08-09 | Stop reason: HOSPADM

## 2021-08-07 RX ORDER — ONDANSETRON 2 MG/ML
4 INJECTION INTRAMUSCULAR; INTRAVENOUS EVERY 6 HOURS PRN
Status: DISCONTINUED | OUTPATIENT
Start: 2021-08-07 | End: 2021-08-09 | Stop reason: HOSPADM

## 2021-08-07 RX ORDER — FERROUS SULFATE 325(65) MG
325 TABLET ORAL 2 TIMES DAILY
Status: DISCONTINUED | OUTPATIENT
Start: 2021-08-07 | End: 2021-08-09 | Stop reason: HOSPADM

## 2021-08-07 RX ORDER — LORAZEPAM 0.5 MG/1
0.5 TABLET ORAL
Status: DISCONTINUED | OUTPATIENT
Start: 2021-08-07 | End: 2021-08-09 | Stop reason: HOSPADM

## 2021-08-07 RX ORDER — DIVALPROEX SODIUM 250 MG/1
250 TABLET, DELAYED RELEASE ORAL EVERY 12 HOURS SCHEDULED
Status: DISCONTINUED | OUTPATIENT
Start: 2021-08-07 | End: 2021-08-09 | Stop reason: HOSPADM

## 2021-08-07 RX ORDER — LANOLIN ALCOHOL/MO/W.PET/CERES
6 CREAM (GRAM) TOPICAL
Status: DISCONTINUED | OUTPATIENT
Start: 2021-08-07 | End: 2021-08-09 | Stop reason: HOSPADM

## 2021-08-07 RX ORDER — MAGNESIUM HYDROXIDE/ALUMINUM HYDROXICE/SIMETHICONE 120; 1200; 1200 MG/30ML; MG/30ML; MG/30ML
30 SUSPENSION ORAL EVERY 6 HOURS PRN
Status: DISCONTINUED | OUTPATIENT
Start: 2021-08-07 | End: 2021-08-09 | Stop reason: HOSPADM

## 2021-08-07 RX ORDER — FUROSEMIDE 20 MG/1
20 TABLET ORAL DAILY
Status: DISCONTINUED | OUTPATIENT
Start: 2021-08-07 | End: 2021-08-09 | Stop reason: HOSPADM

## 2021-08-07 RX ORDER — POLYETHYLENE GLYCOL 3350 17 G/17G
17 POWDER, FOR SOLUTION ORAL DAILY
Status: DISCONTINUED | OUTPATIENT
Start: 2021-08-07 | End: 2021-08-09 | Stop reason: HOSPADM

## 2021-08-07 RX ORDER — DOCUSATE SODIUM 100 MG/1
100 CAPSULE, LIQUID FILLED ORAL 2 TIMES DAILY
Status: DISCONTINUED | OUTPATIENT
Start: 2021-08-07 | End: 2021-08-09 | Stop reason: HOSPADM

## 2021-08-07 RX ORDER — BUPROPION HYDROCHLORIDE 150 MG/1
150 TABLET ORAL DAILY
Status: DISCONTINUED | OUTPATIENT
Start: 2021-08-07 | End: 2021-08-09 | Stop reason: HOSPADM

## 2021-08-07 RX ORDER — LEVOTHYROXINE SODIUM 0.1 MG/1
100 TABLET ORAL DAILY
Status: DISCONTINUED | OUTPATIENT
Start: 2021-08-07 | End: 2021-08-09 | Stop reason: HOSPADM

## 2021-08-07 RX ORDER — SENNOSIDES 8.6 MG
1 TABLET ORAL DAILY
Status: DISCONTINUED | OUTPATIENT
Start: 2021-08-07 | End: 2021-08-09 | Stop reason: HOSPADM

## 2021-08-07 RX ORDER — ACETAMINOPHEN 325 MG/1
650 TABLET ORAL EVERY 6 HOURS PRN
Status: DISCONTINUED | OUTPATIENT
Start: 2021-08-07 | End: 2021-08-09 | Stop reason: HOSPADM

## 2021-08-07 RX ORDER — DIGOXIN 125 MCG
125 TABLET ORAL DAILY
Status: DISCONTINUED | OUTPATIENT
Start: 2021-08-07 | End: 2021-08-09 | Stop reason: HOSPADM

## 2021-08-07 RX ORDER — BUPROPION HYDROCHLORIDE 150 MG/1
300 TABLET ORAL DAILY
Status: DISCONTINUED | OUTPATIENT
Start: 2021-08-07 | End: 2021-08-07

## 2021-08-07 RX ADMIN — LORAZEPAM 0.5 MG: 0.5 TABLET ORAL at 21:00

## 2021-08-07 RX ADMIN — CARBAMAZEPINE 100 MG: 100 TABLET, CHEWABLE ORAL at 23:08

## 2021-08-07 RX ADMIN — DIVALPROEX SODIUM 250 MG: 250 TABLET, DELAYED RELEASE ORAL at 21:00

## 2021-08-07 RX ADMIN — METOPROLOL TARTRATE 25 MG: 25 TABLET, FILM COATED ORAL at 09:29

## 2021-08-07 RX ADMIN — APIXABAN 5 MG: 5 TABLET, FILM COATED ORAL at 09:27

## 2021-08-07 RX ADMIN — DOCUSATE SODIUM 100 MG: 100 CAPSULE, LIQUID FILLED ORAL at 18:01

## 2021-08-07 RX ADMIN — STANDARDIZED SENNA CONCENTRATE 8.6 MG: 8.6 TABLET ORAL at 09:29

## 2021-08-07 RX ADMIN — FUROSEMIDE 20 MG: 40 TABLET ORAL at 09:28

## 2021-08-07 RX ADMIN — FERROUS SULFATE TAB 325 MG (65 MG ELEMENTAL FE) 325 MG: 325 (65 FE) TAB at 09:27

## 2021-08-07 RX ADMIN — BUPROPION 150 MG: 150 TABLET, EXTENDED RELEASE ORAL at 09:29

## 2021-08-07 RX ADMIN — DIGOXIN 125 MCG: 125 TABLET ORAL at 09:29

## 2021-08-07 RX ADMIN — CARBAMAZEPINE 100 MG: 100 TABLET, CHEWABLE ORAL at 09:28

## 2021-08-07 RX ADMIN — METOPROLOL TARTRATE 25 MG: 25 TABLET, FILM COATED ORAL at 21:00

## 2021-08-07 RX ADMIN — MELATONIN TAB 3 MG 6 MG: 3 TAB at 21:00

## 2021-08-07 RX ADMIN — FERROUS SULFATE TAB 325 MG (65 MG ELEMENTAL FE) 325 MG: 325 (65 FE) TAB at 18:01

## 2021-08-07 RX ADMIN — CARBAMAZEPINE 100 MG: 100 TABLET, CHEWABLE ORAL at 18:01

## 2021-08-07 RX ADMIN — DIVALPROEX SODIUM 250 MG: 250 TABLET, DELAYED RELEASE ORAL at 09:29

## 2021-08-07 RX ADMIN — DOCUSATE SODIUM 100 MG: 100 CAPSULE, LIQUID FILLED ORAL at 09:28

## 2021-08-07 RX ADMIN — LEVOTHYROXINE SODIUM 100 MCG: 100 TABLET ORAL at 09:28

## 2021-08-07 NOTE — ASSESSMENT & PLAN NOTE
· Patient presents to the ED from [facility] with complaints of gross hematuria in her baca catheter bag after becoming combative when they tried to change it  · Patient with history of gross hematuria; was treated for it in June 2021  · During her last admission, patient underwent a cystoscopy which discovered a 2 cm nodular mass in the anterolateral wall, a biopsy was collected  She also required evacuation of clot and fulguration of bleeding points  Resection not completed as patient had been on Eliquis  · + for high grade urothelial carcinoma  · Currently, hemoglobin noted to be 9 7 which is within her baseline, even better than her last one  · CBI Initiated in the ED  · Urology consult placed

## 2021-08-07 NOTE — ASSESSMENT & PLAN NOTE
· Chronic  · Patient currently takes 325 mg Ferrous Sulfate BID  · Hemoglobin stable at 9 7, was noted to be 9 5 two weeks ago on 7/24  · Baseline hemoglobin since June appears to be 8-9  · Monitor

## 2021-08-07 NOTE — ED PROVIDER NOTES
History  Chief Complaint   Patient presents with    Urinary Catheter Problem     Patient arrived from [facility] via EMS  Per EMS facility trying to change patients baca tonight and patient refusing and becoming combative  HPI    78 yo F hx of afib on eliquis, ckd copd dementia hld htn seizures stroke chronic indwelling fley from neurogenic bladder, bladder cancer stage ct2, presents to ed for baca catheter change  She has no complaints  No urinary symptoms  No fevers  Per facility was unable to change catheter, patient not tolerating change so sent to er for baca change  Patient has also been dehydrated, urine dark wanda in color  Started on fluids as well IV at facility  No gross hematuria per nursing facility  dnr dni per prior documentation      Prior to Admission Medications   Prescriptions Last Dose Informant Patient Reported? Taking?    Cholecalciferol (VITAMIN D) 2000 units CAPS  Outside Facility (Specify) No No   Sig: Take 1 capsule (2,000 Units total) by mouth daily   Glucagon, rDNA, (Glucagon Emergency) 1 MG KIT  Outside Facility (Specify) Yes No   Sig: Inject as directed   LORazepam (ATIVAN) 0 5 mg tablet  Outside Facility (Specify) No No   Sig: Take 1 tablet (0 5 mg total) by mouth daily at bedtime   acetaminophen (TYLENOL) 325 mg tablet  Outside Facility (Specify) Yes No   Sig: Take 650 mg by mouth every 6 (six) hours as needed for mild pain   albuterol (PROVENTIL HFA,VENTOLIN HFA) 90 mcg/act inhaler  Outside Facility (Specify) No No   Sig: Inhale 2 puffs every 6 (six) hours as needed for wheezing   apixaban (ELIQUIS) 5 mg   No No   Sig: Take 2 tablets (10 mg total) by mouth 2 (two) times a day for 1 day   apixaban (ELIQUIS) 5 mg   No No   Sig: Take 1 tablet (5 mg total) by mouth 2 (two) times a day   bisacodyl (FLEET) 10 MG/30ML ENEM  Outside Facility (Specify) Yes No   Sig: Insert 10 mg into the rectum as needed for constipation   buPROPion (WELLBUTRIN SR) 150 mg 12 hr tablet  Outside Facility (Specify) No No   Sig: Take 1 tablet (150 mg total) by mouth daily   carBAMazepine (TEGretol) 100 mg chewable tablet  Outside Facility (Specify) No No   Sig: Chew 1 tablet (100 mg total) 3 (three) times a day   digoxin (LANOXIN) 0 125 mg tablet  Outside Facility (Specify) Yes No   Sig: Take 125 mcg by mouth daily   divalproex sodium (DEPAKOTE) 250 mg EC tablet  Outside Facility (Specify) Yes No   Sig: Take 250 mg by mouth every 12 (twelve) hours    ferrous sulfate 325 (65 Fe) mg tablet  Outside Facility (Specify) No No   Sig: Take 1 tablet (325 mg total) by mouth 2 (two) times a day   furosemide (LASIX) 20 mg tablet  Outside Facility (Specify) No No   Sig: Take 1 tablet (20 mg total) by mouth daily   levothyroxine 100 mcg tablet  Outside Facility (Specify) No No   Sig: Take 1 tablet (100 mcg total) by mouth daily   magnesium hydroxide (MILK OF MAGNESIA) 400 mg/5 mL oral suspension  Outside Facility (Specify) Yes No   Sig: Take by mouth daily as needed for constipation   melatonin 3 mg  Outside Facility (Specify) Yes No   Sig: Take 6 mg by mouth daily at bedtime   metoprolol tartrate (LOPRESSOR) 25 mg tablet   No No   Sig: Take 1 tablet (25 mg total) by mouth every 12 (twelve) hours   nitroglycerin (NITROSTAT) 0 4 mg SL tablet  Outside Facility (Specify) No No   Sig: Place 1 tablet (0 4 mg total) under the tongue every 5 (five) minutes as needed for chest pain   polyethylene glycol (MIRALAX) 17 g packet   No No   Sig: Take 17 g by mouth daily   senna (SENOKOT) 8 6 MG tablet  Outside Facility (Specify) Yes No   Sig: Take 1 tablet by mouth daily      Facility-Administered Medications: None       Past Medical History:   Diagnosis Date    Arthritis     Atrial fibrillation ([address] )     Brain aneurysm     CAD (coronary artery disease)     Cardiac disease     had stents 12 years ago in [facility]    Chronic kidney disease     CKD (chronic kidney disease) stage 3, GFR 30-59 ml/min ([facility]) ckd    COPD (chronic obstructive pulmonary disease) (HCC)     Coronary artery disease     Dementia (HCC)     Diastolic CHF, chronic (HCC)     Disease of thyroid gland     GERD (gastroesophageal reflux disease)     Hyperlipidemia     Hyperlipidemia     Hypertension     Hypothyroidism     Migraine     PAD (peripheral artery disease) (HCC)     Psychiatric disorder     Renal disorder     Seizures [facility] [facility]     Stroke ([facility] [address] [facility]     TIA (transient ischemic attack)        Past Surgical History:   Procedure Laterality Date    APPENDECTOMY      ARTERIOGRAM N/A 12/7/2018    Procedure: ARTERIOGRAM WITH STENT PLACEMENT;  Surgeon: Kimberly Reddy MD;  [facility];  Service: Vascular    BLADDER TUMOR EXCISION     [id number]  clip right frontal lobe    CARDIAC SURGERY      COLONOSCOPY      CORONARY STENT PLACEMENT      5 stents    EYE SURGERY      IR AORTAGRAM WITH RUN-OFF  12/7/2018    MANDIBLE FRACTURE SURGERY      IA CYSTOURETHROSCOPY W/IRRIG & EVAC CLOTS N/A 6/19/2021    Procedure: CYSTOSCOPY EVACUATION OF CLOTS;  Surgeon: Hailey Mcnulty MD;  Location[facility];  Service: Urology    TONSILLECTOMY      TRANSURETHRAL RESECTION OF BLADDER TUMOR N/A 6/23/2021    Procedure: TRANSURETHRAL RESECTION OF BLADDER TUMOR (TURBT); Surgeon: Hailey Mcnulty MD;  [facility];  Service: Urology    TUBAL LIGATION      UTERINE FIBROID SURGERY         Family History   Problem Relation Age of Onset    Heart disease Father     Parkinsonism Father     Macular degeneration Mother     Glaucoma Mother     Diabetes Brother     Heart disease Brother      I have reviewed and agree with the history as documented      E-Cigarette/Vaping    E-Cigarette Use Never User      E-Cigarette/Vaping Substances    Nicotine No     THC No     CBD No     Flavoring No     Other No     Unknown No      Social History     Tobacco Use    Smoking status: Former Smoker     Packs/day: 5 00     Years: 40 00 Pack years: 200 00     Quit date: 2007     Years since quittin 9    Smokeless tobacco: Never Used   Vaping Use    Vaping Use: Never used   Substance Use Topics    Alcohol use: Not Currently     Alcohol/week: 0 0 standard drinks    Drug use: No       Review of Systems   Unable to perform ROS: Dementia       Physical Exam  Physical Exam  Vitals and nursing note reviewed  Constitutional:       General: She is not in acute distress  HENT:      Head: Normocephalic and atraumatic  Eyes:      Conjunctiva/sclera: Conjunctivae normal    Cardiovascular:      Rate and Rhythm: Normal rate and regular rhythm  Heart sounds: Normal heart sounds  Pulmonary:      Effort: Pulmonary effort is normal  No respiratory distress  Breath sounds: Normal breath sounds  Abdominal:      General: Bowel sounds are normal       Palpations: Abdomen is soft  Tenderness: There is no abdominal tenderness  Genitourinary:     Comments: No catheter in place at this time  Musculoskeletal:         General: Normal range of motion  Cervical back: Normal range of motion  Skin:     General: Skin is warm and dry  Findings: No rash  Neurological:      Mental Status: She is alert  She is disoriented  Cranial Nerves: No cranial nerve deficit  Sensory: No sensory deficit  Motor: No abnormal muscle tone        Coordination: Coordination normal       Comments: Dementia at baseline  Oriented to person only         Vital Signs  ED Triage Vitals   Temperature Pulse Respirations Blood Pressure SpO2   21 0235 21 0235 21 0235 21 0235 21 0235   97 9 °F (36 6 °C) 60 18 130/60 94 %      Temp Source Heart Rate Source Patient Position - Orthostatic VS BP Location FiO2 (%)   21 0235 21 0235 21 0545 21 0545 --   Oral Monitor Lying Right arm       Pain Score       --                  Vitals:    21 0235 21 0545 21 0630   BP: 130/60 135/61 148/85 Pulse: 60 57 (!) 51   Patient Position - Orthostatic VS:  Lying          Visual Acuity      ED Medications  Medications - No data to display    Diagnostic Studies  Results Reviewed     Procedure Component Value Units Date/Time    Protime-INR [[id number]]  (Abnormal) Collected: 08/07/21 0451    Lab Status: Final result Specimen: Blood from Arm, Left Updated: 08/07/21 0535     Protime 14 6 seconds      INR 1 11    APTT [[id number]]  (Normal) Collected: 08/07/21 0451    Lab Status: Final result Specimen: Blood from Arm, Left Updated: 08/07/21 0535     PTT 29 seconds     Basic metabolic panel [[id number]]  (Abnormal) Collected: 08/07/21 0451    Lab Status: Final result Specimen: Blood from Arm, Left Updated: 08/07/21 0530     Sodium 144 mmol/L      Potassium 5 1 mmol/L      Chloride 109 mmol/L      CO2 28 mmol/L      ANION GAP 7 mmol/L      BUN 18 mg/dL      Creatinine 1 12 mg/dL      Glucose 113 mg/dL      Calcium 8 8 mg/dL      eGFR 49 ml/min/1 73sq m     Narrative:      Justine guidelines for Chronic Kidney Disease (CKD):     Stage 1 with normal or high GFR (GFR > 90 mL/min/1 73 square meters)    Stage 2 Mild CKD (GFR = 60-89 mL/min/1 73 square meters)    Stage 3A Moderate CKD (GFR = 45-59 mL/min/1 73 square meters)    Stage 3B Moderate CKD (GFR = 30-44 mL/min/1 73 square meters)    Stage 4 Severe CKD (GFR = 15-29 mL/min/1 73 square meters)    Stage 5 End Stage CKD (GFR <15 mL/min/1 73 square meters)  Note: GFR calculation is accurate only with a steady state creatinine    CBC and differential [[id number]]  (Abnormal) Collected: 08/07/21 0451    Lab Status: Final result Specimen: Blood from Arm, Left Updated: 08/07/21 0504     WBC 5 47 Thousand/uL      RBC 3 00 Million/uL      Hemoglobin 9 7 g/dL      Hematocrit 31 5 %       fL      MCH 32 3 pg      MCHC 30 8 g/dL      RDW 14 2 %      MPV 12 1 fL      Platelets 292 Thousands/uL      nRBC 0 /100 WBCs      Neutrophils Relative 42 % Immat GRANS % 1 %      Lymphocytes Relative 42 %      Monocytes Relative 9 %      Eosinophils Relative 5 %      Basophils Relative 1 %      Neutrophils Absolute 2 35 Thousands/µL      Immature Grans Absolute 0 06 Thousand/uL      Lymphocytes Absolute 2 30 Thousands/µL      Monocytes Absolute 0 47 Thousand/µL      Eosinophils Absolute 0 25 Thousand/µL      Basophils Absolute 0 04 Thousands/µL                  No orders to display              Procedures  Procedures         ED Course  ED Course as of Aug 07 0819   Sat Aug 07, 2021   0356 Catheter changed, vikram blood returning  Patient on eliquis, hx of bladder cancer      0518 baseline   Hemoglobin(!): 9 7   0534 Has hx of gross hematuria  CBI done  Urine cleared              MDM     Davis catheter change, by nursing staff, hematuria persisted, will require admission for Urology consultation  Admitted to Medicine for further management  Disposition  Final diagnoses:   Hematuria, unspecified type   Problem with Davis catheter, initial encounter [facility])   Gross hematuria   Bladder cancer ([facility] [address] )     Time reflects when diagnosis was documented in both MDM as applicable and the Disposition within this note     Time User Action Codes Description Comment    8/7/2021  6:28 AM Camila Martinez [R31 9] Hematuria, unspecified type     8/7/2021  6:29 AM Aidee, [address]  9XXA] Problem with Davis catheter, initial encounter ([facility] [address] )     8/7/2021  6:29 AM Camila Martinez [R31 0] Gross hematuria     8/7/2021  6:29 AM Camila Martinez [C67 9] Bladder cancer [facility])       ED Disposition     ED Disposition Condition Date/Time Comment    Admit Stable Sat Aug 7, 2021  6:28 AM Case was discussed with HANANE and the patient's admission status was agreed to be Admission Status: observation status to the service of Dr Samara Serna          Follow-up Information    None         Patient's Medications   Discharge Prescriptions    No medications on file     No discharge procedures on file     PDMP Review       Value Time User    PDMP Reviewed  Yes 7/24/2021 10:03 AM Carissa Hutchinson MD          ED Provider  Electronically Signed by           Te Norris MD  08/07/21 8698

## 2021-08-07 NOTE — ED NOTES
Per verbal from Provider discontinueCBI after 2 bag of fluid       Maria Fernanda Obregon RN  08/07/21 9654

## 2021-08-07 NOTE — ASSESSMENT & PLAN NOTE
Lab Results   Component Value Date    EGFR 49 08/07/2021    EGFR 42 07/24/2021    EGFR 42 07/23/2021    CREATININE 1 12 08/07/2021    CREATININE 1 25 07/24/2021    CREATININE 1 25 07/23/2021     · History of CKD stg 3  · Baseline creatinine appears to be 1 3-1 5 in the last 6 months  · Patient is well below her baseline  · Avoid nephrotoxins, hypotension  · Monitor

## 2021-08-07 NOTE — H&P
[address] [birth year], 77 y o  female MRN: [id number]  Unit/Bed#: ED 23 Encounter: [id number]  Primary Care Provider: Steven Craig DO   Date and time admitted to hospital: 8/7/2021  2:31 AM    * Gross hematuria  Assessment & Plan  · Patient presents to the ED from [facility] with complaints of gross hematuria in her baca catheter bag after becoming combative when they tried to change it  · Patient with history of gross hematuria; was treated for it in June 2021  · During her last admission, patient underwent a cystoscopy which discovered a 2 cm nodular mass in the anterolateral wall, a biopsy was collected  She also required evacuation of clot and fulguration of bleeding points  Resection not completed as patient had been on Eliquis  · + for high grade urothelial carcinoma  · Currently, hemoglobin noted to be 9 7 which is within her baseline, even better than her last one  · CBI Initiated in the ED  · Urology consult placed  · Discussed case with on call Urologist; recommended manual irrigation to determine if presence of clots  · May require transfer to [facility]    Macrocytic anemia  Assessment & Plan  · Chronic  · Patient currently takes 325 mg Ferrous Sulfate BID  · Hemoglobin stable at 9 7, was noted to be 9 5 two weeks ago on 7/24  · Baseline hemoglobin since June appears to be 8-9  · Monitor  Chronic indwelling Baca catheter  Assessment & Plan  · Chronic  · Baca care    Atrial fibrillation [facility])  Assessment & Plan  · Chronic  · Continue home medication  · Will hold Eliquis in setting of gross hematuria  · May need cardiology evaluation to determine if she is a good candidate as she has recurrent problem of gross hematuria  Hyperlipidemia  Assessment & Plan  · Chronic  · Continue home medication regimen      Dementia ([facility] [address] )  Assessment & Plan  · Chronic  · Supportive measures  · Continue home medication regimen    Chronic diastolic heart failure [facility])  Assessment & Plan  Wt Readings from Last 3 Encounters:   07/27/21 72 kg (158 lb 11 7 oz)   07/12/21 76 2 kg (168 lb)   07/09/21 76 6 kg (168 lb 12 8 oz)       · Chronic  · Appears to be euvolemic  · Continue to monitor daily weights, I&Os  · Continue home medication regimen  VTE Pharmacologic Prophylaxis: VTE Score: 6 High Risk (Score >/= 5) - Pharmacological DVT Prophylaxis Ordered: apixaban (Eliquis)  Sequential Compression Devices Ordered  Code Status: Level 3 - DNAR and DNI Level 3, DNR/DNI  Discussion with family: Attempted to update  (daughter) via phone  Left voicemail  Called home/cell phone numbers  Anticipated Length of Stay: Patient will be admitted on an observation basis with an anticipated length of stay of less than 2 midnights secondary to gross hematuria, currently with CBI  Chloe Milian Total Time for Visit, including Counseling / Coordination of Care: 60 minutes Greater than 50% of this total time spent on direct patient counseling and coordination of care  Chief Complaint: Gross hematuria in baca catheter    History of Present Illness: Aldo Vasques is a 77 y o  female with a PMH of CHF, CKD, HTN, HLD, A-Fib on Eliquis, Hypothyroidism, and Urothelial Cancer who presents with gross hematuria after she became combative when nursing facility attempted to change her baca catheter  Patient has been seen in the hospital for this problem before, at which time they did a cystoscopy on her and discovered she had a nodular mass in her anterolateral wall; this was biopsied and was positive for high grade urothelial carcinoma  Patient currently without any complaints, she is demented at baseline  Attempted to call family, unsuccessful      Review of Systems:  Review of Systems   Unable to perform ROS: Dementia       Past Medical and Surgical History:   Past Medical History:   Diagnosis Date    Arthritis     Atrial fibrillation ([address] )     Brain aneurysm     CAD (coronary artery disease)     Cardiac disease     had stents 12 years ago in [facility]    Chronic kidney disease     CKD (chronic kidney disease) stage 3, GFR 30-59 ml/min (HCC) ckd    COPD (chronic obstructive pulmonary disease) (HCC)     Coronary artery disease     Dementia (HCC)     Diastolic CHF, chronic (HCC)     Disease of thyroid gland     GERD (gastroesophageal reflux disease)     Hyperlipidemia     Hyperlipidemia     Hypertension     Hypothyroidism     Migraine     PAD (peripheral artery disease) (HCC)     Psychiatric disorder     Renal disorder     Seizures [facility] [address] )     Stroke ([facility] [address] )     TIA (transient ischemic attack)        Past Surgical History:   Procedure Laterality Date    APPENDECTOMY      ARTERIOGRAM N/A 12/7/2018    Procedure: ARTERIOGRAM WITH STENT PLACEMENT;  Surgeon: Ban Reddy MD;  [facility];  Service: Vascular    BLADDER TUMOR EXCISION     [id number]  clip right frontal lobe    CARDIAC SURGERY      COLONOSCOPY      CORONARY STENT PLACEMENT      5 stents    EYE SURGERY      IR AORTAGRAM WITH RUN-OFF  12/7/2018    MANDIBLE FRACTURE SURGERY      IA CYSTOURETHROSCOPY W/IRRIG & EVAC CLOTS N/A 6/19/2021    Procedure: CYSTOSCOPY EVACUATION OF CLOTS;  Surgeon: Jann Tong MD;  Location[facility];  Service: Urology    TONSILLECTOMY      TRANSURETHRAL RESECTION OF BLADDER TUMOR N/A 6/23/2021    Procedure: TRANSURETHRAL RESECTION OF BLADDER TUMOR (TURBT); Surgeon: Jann Tong MD;  Location: [facility];  Service: Urology    TUBAL LIGATION      UTERINE FIBROID SURGERY         Meds/Allergies:  Prior to Admission medications    Medication Sig Start Date End Date Taking?  Authorizing Provider   acetaminophen (TYLENOL) 325 mg tablet Take 650 mg by mouth every 6 (six) hours as needed for mild pain    Historical Provider, MD   albuterol (PROVENTIL HFA,VENTOLIN HFA) 90 mcg/act inhaler Inhale 2 puffs every 6 (six) hours as needed for wheezing 12/14/18   Pablo Yi MD   apixaban (ELIQUIS) 5 mg Take 2 tablets (10 mg total) by mouth 2 (two) times a day for 1 day 7/27/21 7/28/21  Maury Lance MD   apixaban (ELIQUIS) 5 mg Take 1 tablet (5 mg total) by mouth 2 (two) times a day 7/28/21 10/20/21  Maury Lance MD   bisacodyl (FLEET) 10 MG/30ML ENEM Insert 10 mg into the rectum as needed for constipation    Historical Provider, MD   buPROPion [facility] SR) 150 mg 12 hr tablet Take 1 tablet (150 mg total) by mouth daily 12/14/18   Pablo Yi MD   carBAMazepine (TEGretol) 100 mg chewable tablet Chew 1 tablet (100 mg total) 3 (three) times a day 12/14/18   Pablo Yi MD   Cholecalciferol (VITAMIN D) 2000 units CAPS Take 1 capsule (2,000 Units total) by mouth daily 12/14/18   Pablo Yi MD   digoxin (LANOXIN) 0 125 mg tablet Take 125 mcg by mouth daily    Historical Provider, MD   divalproex sodium (DEPAKOTE) 250 mg EC tablet Take 250 mg by mouth every 12 (twelve) hours  5/8/20   Historical Provider, MD   ferrous sulfate 325 (65 Fe) mg tablet Take 1 tablet (325 mg total) by mouth 2 (two) times a day 12/14/18   Pablo Yi MD   furosemide (LASIX) 20 mg tablet Take 1 tablet (20 mg total) by mouth daily 1/4/21   TANVI Haque   Glucagon, rDNA, (Glucagon Emergency) 1 MG KIT Inject as directed    Historical Provider, MD   levothyroxine 100 mcg tablet Take 1 tablet (100 mcg total) by mouth daily 6/25/21   Devin Meza MD   LORazepam (ATIVAN) 0 5 mg tablet Take 1 tablet (0 5 mg total) by mouth daily at bedtime 6/25/21   Devin Meza MD   magnesium hydroxide (MILK OF MAGNESIA) 400 mg/5 mL oral suspension Take by mouth daily as needed for constipation    Historical Provider, MD   melatonin 3 mg Take 6 mg by mouth daily at bedtime    Historical Provider, MD   metoprolol tartrate (LOPRESSOR) 25 mg tablet Take 1 tablet (25 mg total) by mouth every 12 (twelve) hours 7/24/21   Maury Lance MD   nitroglycerin (NITROSTAT) 0 4 mg SL tablet Place 1 tablet (0 4 mg total) under the tongue every 5 (five) minutes as needed for chest pain 18   Brennan Rubinstein, MD   polyethylene glycol (MIRALAX) 17 g packet Take 17 g by mouth daily 21   Angelo Khan MD   senna (SENOKOT) 8 6 MG tablet Take 1 tablet by mouth daily    Historical Provider, MD     I have reveiwed home medications using records provided by [facility]  Allergies: Allergies   Allergen Reactions    Dye [Iodinated Diagnostic Agents] Other (See Comments)     allergy    Spiriva Handihaler [Tiotropium Bromide Monohydrate] Swelling    Ramipril Facial Swelling    Penicillins      unsure       Social History:  Marital Status:    Occupation: Retired/Disabled  Patient Pre-hospital Living Situation: [facility]: [address] [place] [address]  Patient Pre-hospital Level of Mobility: unable to be assessed at time of evaluation  Patient Pre-hospital Diet Restrictions: None  Substance Use History:   Social History     Substance and Sexual Activity   Alcohol Use Not Currently    Alcohol/week: 0 0 standard drinks     Social History     Tobacco Use   Smoking Status Former Smoker    Packs/day: 5 00    Years: 40 00    Pack years: 200 00    Quit date: 2007    Years since quittin 9   Smokeless Tobacco Never Used     Social History     Substance and Sexual Activity   Drug Use No       Family History:  Family History   Problem Relation Age of Onset    Heart disease Father     Parkinsonism Father     Macular degeneration Mother     Glaucoma Mother     Diabetes Brother     Heart disease Brother        Physical Exam:     Vitals:   Blood Pressure: 164/79 (21 1000)  Pulse: 58 (21 1000)  Temperature: 97 9 °F (36 6 °C) (21 0235)  Temp Source: Oral (21 0235)  Respirations: 18 (21 1000)  SpO2: 96 % (21 1000)    Physical Exam  Vitals and nursing note reviewed  Constitutional:       General: She is not in acute distress  Appearance: She is obese   She is ill-appearing  Cardiovascular:      Rate and Rhythm: Normal rate  Pulses: Normal pulses  Heart sounds: Normal heart sounds  Pulmonary:      Effort: Pulmonary effort is normal       Breath sounds: Normal breath sounds  Abdominal:      General: Bowel sounds are normal       Palpations: Abdomen is soft  Genitourinary:     Comments: Davis catheter in place; light red urine in bag  Musculoskeletal:         General: Normal range of motion  Skin:     General: Skin is warm and dry  Capillary Refill: Capillary refill takes less than 2 seconds  Neurological:      Mental Status: She is alert  Mental status is at baseline  She is disoriented  Psychiatric:         Mood and Affect: Mood normal         Additional Data:     Lab Results:  Results from last 7 days   Lab Units 08/07/21  0451   WBC Thousand/uL 5 47   HEMOGLOBIN g/dL 9 7*   HEMATOCRIT % 31 5*   PLATELETS Thousands/uL 161   NEUTROS PCT % 42*   LYMPHS PCT % 42   MONOS PCT % 9   EOS PCT % 5     Results from last 7 days   Lab Units 08/07/21  0451   SODIUM mmol/L 144   POTASSIUM mmol/L 5 1   CHLORIDE mmol/L 109*   CO2 mmol/L 28   BUN mg/dL 18   CREATININE mg/dL 1 12   ANION GAP mmol/L 7   CALCIUM mg/dL 8 8   GLUCOSE RANDOM mg/dL 113     Results from last 7 days   Lab Units 08/07/21  0451   INR  1 11                   Imaging: No pertinent imaging reviewed  No orders to display       EKG and Other Studies Reviewed on Admission:   · EKG: Atrial fibrillation  HR 70     ** Please Note: This note has been constructed using a voice recognition system   **

## 2021-08-07 NOTE — ASSESSMENT & PLAN NOTE
· Chronic  · Continue home medication  · Will hold Eliquis in setting of gross hematuria  · May need cardiology evaluation to determine if she is a good candidate as she has recurrent problem of gross hematuria

## 2021-08-07 NOTE — ASSESSMENT & PLAN NOTE
· Patient with history of bradycardia; was recently decreased on her Lopressor to 25 mg BID  · Heart rate stable in the 50s at this time, continue lopressor with hold parameters  · Monitor

## 2021-08-07 NOTE — ASSESSMENT & PLAN NOTE
· Patient presents to the ED from [facility] with complaints of gross hematuria in her baca catheter bag after becoming combative when they tried to change it  · Patient with history of gross hematuria; was treated for it in June 2021  · During her last admission, patient underwent a cystoscopy which discovered a 2 cm nodular mass in the anterolateral wall, a biopsy was collected  She also required evacuation of clot and fulguration of bleeding points  Resection not completed as patient had been on Eliquis  · + for high grade urothelial carcinoma  · Currently, hemoglobin noted to be 9 7 which is within her baseline, even better than her last one  · CBI Initiated in the ED  · Urology consult placed  · Discussed case with on call Urologist; recommended manual irrigation to determine if presence of clots     · May require transfer to [facility]

## 2021-08-07 NOTE — ED NOTES
Per Provider continuous bladder irrigation taking place currently        Tammy Glynn RN  08/07/21 6687

## 2021-08-07 NOTE — ASSESSMENT & PLAN NOTE
Wt Readings from Last 3 Encounters:   07/27/21 72 kg (158 lb 11 7 oz)   07/12/21 76 2 kg (168 lb)   07/09/21 76 6 kg (168 lb 12 8 oz)       · Chronic  · Appears to be euvolemic  · Continue to monitor daily weights, I&Os  · Continue home medication regimen

## 2021-08-07 NOTE — ASSESSMENT & PLAN NOTE
· Chronic  · Hemoglobin 9 7 on admission  · Patient currently takes 325 mg Ferrous Sulfate BID  · Hemoglobin stable at 9 2  · Baseline hemoglobin since June appears to be 8-9  · Monitor

## 2021-08-08 LAB
ANION GAP SERPL CALCULATED.3IONS-SCNC: 8 MMOL/L (ref 4–13)
BUN SERPL-MCNC: 22 MG/DL (ref 5–25)
CALCIUM SERPL-MCNC: 8.6 MG/DL (ref 8.3–10.1)
CHLORIDE SERPL-SCNC: 110 MMOL/L (ref 100–108)
CO2 SERPL-SCNC: 27 MMOL/L (ref 21–32)
CREAT SERPL-MCNC: 1.22 MG/DL (ref 0.6–1.3)
ERYTHROCYTE [DISTWIDTH] IN BLOOD BY AUTOMATED COUNT: 14.5 % (ref 11.6–15.1)
GFR SERPL CREATININE-BSD FRML MDRD: 44 ML/MIN/1.73SQ M
GLUCOSE SERPL-MCNC: 114 MG/DL (ref 65–140)
HCT VFR BLD AUTO: 30 % (ref 34.8–46.1)
HGB BLD-MCNC: 9.2 G/DL (ref 11.5–15.4)
MAGNESIUM SERPL-MCNC: 2.4 MG/DL (ref 1.6–2.6)
MCH RBC QN AUTO: 31.8 PG (ref 26.8–34.3)
MCHC RBC AUTO-ENTMCNC: 30.7 G/DL (ref 31.4–37.4)
MCV RBC AUTO: 104 FL (ref 82–98)
PHOSPHATE SERPL-MCNC: 4.2 MG/DL (ref 2.3–4.1)
PLATELET # BLD AUTO: 149 THOUSANDS/UL (ref 149–390)
PMV BLD AUTO: 11.1 FL (ref 8.9–12.7)
POTASSIUM SERPL-SCNC: 4.7 MMOL/L (ref 3.5–5.3)
RBC # BLD AUTO: 2.89 MILLION/UL (ref 3.81–5.12)
SODIUM SERPL-SCNC: 145 MMOL/L (ref 136–145)
WBC # BLD AUTO: 5.25 THOUSAND/UL (ref 4.31–10.16)

## 2021-08-08 PROCEDURE — 80048 BASIC METABOLIC PNL TOTAL CA: CPT | Performed by: NURSE PRACTITIONER

## 2021-08-08 PROCEDURE — 99225 PR SBSQ OBSERVATION CARE/DAY 25 MINUTES: CPT | Performed by: NURSE PRACTITIONER

## 2021-08-08 PROCEDURE — 83735 ASSAY OF MAGNESIUM: CPT | Performed by: NURSE PRACTITIONER

## 2021-08-08 PROCEDURE — 85027 COMPLETE CBC AUTOMATED: CPT | Performed by: NURSE PRACTITIONER

## 2021-08-08 PROCEDURE — 84100 ASSAY OF PHOSPHORUS: CPT | Performed by: NURSE PRACTITIONER

## 2021-08-08 RX ADMIN — DOCUSATE SODIUM 100 MG: 100 CAPSULE, LIQUID FILLED ORAL at 09:57

## 2021-08-08 RX ADMIN — METOPROLOL TARTRATE 25 MG: 25 TABLET, FILM COATED ORAL at 22:42

## 2021-08-08 RX ADMIN — DIGOXIN 125 MCG: 125 TABLET ORAL at 09:58

## 2021-08-08 RX ADMIN — STANDARDIZED SENNA CONCENTRATE 8.6 MG: 8.6 TABLET ORAL at 09:58

## 2021-08-08 RX ADMIN — FERROUS SULFATE TAB 325 MG (65 MG ELEMENTAL FE) 325 MG: 325 (65 FE) TAB at 09:58

## 2021-08-08 RX ADMIN — CARBAMAZEPINE 100 MG: 100 TABLET, CHEWABLE ORAL at 09:58

## 2021-08-08 RX ADMIN — APIXABAN 5 MG: 5 TABLET, FILM COATED ORAL at 12:44

## 2021-08-08 RX ADMIN — DOCUSATE SODIUM 100 MG: 100 CAPSULE, LIQUID FILLED ORAL at 17:04

## 2021-08-08 RX ADMIN — CARBAMAZEPINE 100 MG: 100 TABLET, CHEWABLE ORAL at 22:42

## 2021-08-08 RX ADMIN — FUROSEMIDE 20 MG: 40 TABLET ORAL at 09:58

## 2021-08-08 RX ADMIN — DIVALPROEX SODIUM 250 MG: 250 TABLET, DELAYED RELEASE ORAL at 22:41

## 2021-08-08 RX ADMIN — DIVALPROEX SODIUM 250 MG: 250 TABLET, DELAYED RELEASE ORAL at 09:57

## 2021-08-08 RX ADMIN — METOPROLOL TARTRATE 25 MG: 25 TABLET, FILM COATED ORAL at 09:57

## 2021-08-08 RX ADMIN — POLYETHYLENE GLYCOL 3350 17 G: 17 POWDER, FOR SOLUTION ORAL at 10:00

## 2021-08-08 RX ADMIN — CARBAMAZEPINE 100 MG: 100 TABLET, CHEWABLE ORAL at 17:00

## 2021-08-08 RX ADMIN — ACETAMINOPHEN 650 MG: 325 TABLET, FILM COATED ORAL at 06:24

## 2021-08-08 RX ADMIN — LORAZEPAM 0.5 MG: 0.5 TABLET ORAL at 22:42

## 2021-08-08 RX ADMIN — FERROUS SULFATE TAB 325 MG (65 MG ELEMENTAL FE) 325 MG: 325 (65 FE) TAB at 17:04

## 2021-08-08 RX ADMIN — BUPROPION 150 MG: 150 TABLET, EXTENDED RELEASE ORAL at 09:58

## 2021-08-08 RX ADMIN — LEVOTHYROXINE SODIUM 100 MCG: 100 TABLET ORAL at 09:58

## 2021-08-08 RX ADMIN — MELATONIN TAB 3 MG 6 MG: 3 TAB at 22:41

## 2021-08-08 NOTE — PROGRESS NOTES
[facility]  Progress Note Lizett Khalil [birth year], 77 y o  female MRN: [id number]  Unit/Bed#: -02 Encounter: [id number]  Primary Care Provider: Judy Trammell DO   Date and time admitted to hospital: 8/7/2021  2:31 AM    * Gross hematuria  Assessment & Plan  · Patient presents to the ED from [facility] with complaints of gross hematuria in her baca catheter bag after becoming combative when they tried to change it  · Patient with history of gross hematuria; was treated for it in June 2021  · During her last admission, patient underwent a cystoscopy which discovered a 2 cm nodular mass in the anterolateral wall, a biopsy was collected  She also required evacuation of clot and fulguration of bleeding points  Resection not completed as patient had been on Eliquis  · + for high grade urothelial carcinoma  · Currently, hemoglobin noted to be 9 7 which is within her baseline, even better than her last one  · CBI Initiated in the ED  · Urology consult placed  · Discussed case with on call Urologist; recommended manual irrigation to determine if presence of clots  · May require transfer to [facility]    Macrocytic anemia  Assessment & Plan  · Chronic  · Hemoglobin 9 7 on admission  · Patient currently takes 325 mg Ferrous Sulfate BID  · Hemoglobin stable at 9 2  · Baseline hemoglobin since June appears to be 8-9  · Monitor  Essential hypertension  Assessment & Plan  · Chronic  · Continue home medications  · Monitor vital signs per unit routine  Bradycardia  Assessment & Plan  · Patient with history of bradycardia; was recently decreased on her Lopressor to 25 mg BID  · Heart rate stable in the 50s at this time, continue lopressor with hold parameters  · Monitor      Chronic indwelling Baca catheter  Assessment & Plan  · Chronic  · Baca care    Atrial fibrillation [facility])  Assessment & Plan  · Chronic  · Continue home medication  · Will hold Eliquis in setting of gross hematuria  · May need cardiology evaluation to determine if she is a good candidate as she has recurrent problem of gross hematuria  Stage 3 chronic kidney disease  Assessment & Plan  Lab Results   Component Value Date    EGFR 44 08/08/2021    EGFR 49 08/07/2021    EGFR 42 07/24/2021    CREATININE 1 22 08/08/2021    CREATININE 1 12 08/07/2021    CREATININE 1 25 07/24/2021     · History of CKD stg 3  · Baseline creatinine appears to be 1 3-1 5 in the last 6 months  · Patient is well below her baseline  · Avoid nephrotoxins, hypotension  · Monitor  Hyperlipidemia  Assessment & Plan  · Chronic  · Continue home medication regimen  Dementia ([facility] [address] )  Assessment & Plan  · Chronic  · Supportive measures  · Continue home medication regimen    Chronic diastolic heart failure (HCC)  Assessment & Plan  Wt Readings from Last 3 Encounters:   07/27/21 72 kg (158 lb 11 7 oz)   07/12/21 76 2 kg (168 lb)   07/09/21 76 6 kg (168 lb 12 8 oz)       · Chronic  · Appears to be euvolemic  · Continue to monitor daily weights, I&Os  · Continue home medication regimen  VTE Pharmacologic Prophylaxis: VTE Score: 6 Moderate Risk (Score 3-4) - Pharmacological DVT Prophylaxis Contraindicated  Sequential Compression Devices Ordered  Patient Centered Rounds: I performed bedside rounds with nursing staff today  Discussions with Specialists or Other Care Team Provider:     Education and Discussions with Family / Patient: Attempted to update  (daughter) via phone  Left voicemail  Time Spent for Care: 20 minutes  More than 50% of total time spent on counseling and coordination of care as described above  Current Length of Stay: 0 day(s)  Current Patient Status: Observation   Certification Statement: The patient will continue to require additional inpatient hospital stay due to ongoing treatment in setting of gross hematuria    Discharge Plan: Anticipate discharge in 24-48 hrs to prior assisted or independent living facility  Code Status: Level 3 - DNAR and DNI    Subjective:   Patient resting in bed, watching television  She is demented but pleasant and cooperative  Denies pain, shortness of breath, fever, or chills  Urine noted in her baca bag is now clear yellow without any sign of bleeding  She has been off her eliquis since yesterday morning  Objective:     Vitals:   Temp (24hrs), Av 2 °F (36 2 °C), Min:96 °F (35 6 °C), Max:98 2 °F (36 8 °C)    Temp:  [96 °F (35 6 °C)-98 2 °F (36 8 °C)] 97 3 °F (36 3 °C)  HR:  [58-68] 60  Resp:  [17-20] 18  BP: (118-164)/(50-79) 118/50  SpO2:  [91 %-96 %] 91 %  Body mass index is 28 98 kg/m²  Input and Output Summary (last 24 hours): Intake/Output Summary (Last 24 hours) at 2021 0849  Last data filed at 2021 4664  Gross per 24 hour   Intake 150 ml   Output 1650 ml   Net -1500 ml       Physical Exam:   Physical Exam  Vitals and nursing note reviewed  Constitutional:       General: She is not in acute distress  Appearance: She is obese  She is not ill-appearing  Cardiovascular:      Rate and Rhythm: Normal rate  Pulses: Normal pulses  Heart sounds: Normal heart sounds  Pulmonary:      Effort: Pulmonary effort is normal       Breath sounds: Normal breath sounds  Abdominal:      General: Bowel sounds are normal       Palpations: Abdomen is soft  Genitourinary:     Comments: Baca catheter in place; clear yellow urine in the bag  Musculoskeletal:         General: Normal range of motion  Skin:     General: Skin is warm and dry  Capillary Refill: Capillary refill takes less than 2 seconds  Neurological:      Mental Status: She is alert  Mental status is at baseline     Psychiatric:         Mood and Affect: Mood normal           Additional Data:     Labs:  Results from last 7 days   Lab Units 21  0710 21  0451   WBC Thousand/uL 5 25 5 47   HEMOGLOBIN g/dL 9 2* 9 7*   HEMATOCRIT % 30 0* 31 5*   PLATELETS Thousands/uL 149 161 NEUTROS PCT %  --  42*   LYMPHS PCT %  --  42   MONOS PCT %  --  9   EOS PCT %  --  5     Results from last 7 days   Lab Units 08/08/21  0619   SODIUM mmol/L 145   POTASSIUM mmol/L 4 7   CHLORIDE mmol/L 110*   CO2 mmol/L 27   BUN mg/dL 22   CREATININE mg/dL 1 22   ANION GAP mmol/L 8   CALCIUM mg/dL 8 6   GLUCOSE RANDOM mg/dL 114     Results from last 7 days   Lab Units 08/07/21  0451   INR  1 11                   Lines/Drains:  Invasive Devices     Peripheral Intravenous Line            Peripheral IV 08/07/21 Left Antecubital 1 day          Drain            Urethral Catheter Double-lumen 22 Fr  45 days    Urethral Catheter Three way 1 day              Urinary Catheter:  Goal for removal: N/A - Chronic Davis         Imaging: No pertinent imaging reviewed      Recent Cultures (last 7 days):         Last 24 Hours Medication List:   Current Facility-Administered Medications   Medication Dose Route Frequency Provider Last Rate    acetaminophen  650 mg Oral Q6H PRN Lizeth Rede, CRNP      aluminum-magnesium hydroxide-simethicone  30 mL Oral Q6H PRN Lizeth Rede, CRNP      buPROPion  150 mg Oral Daily Lizeth Rede, CRNP      carBAMazepine  100 mg Oral TID Lizeth Rede, CRNP      digoxin  125 mcg Oral Daily Lizeth Rede, CRNP      divalproex sodium  250 mg Oral Q12H [address] Lizeth Rede, CRNP      docusate sodium  100 mg Oral BID Lizeth Rede, CRNP      ferrous sulfate  325 mg Oral BID Lizeth Rede, CRNP      furosemide  20 mg Oral Daily Lizeth Rede, CRNP      levothyroxine  100 mcg Oral Daily Lizeth Rede, CRNP      LORazepam  0 5 mg Oral HS Lizeth Rede, CRNP      melatonin  6 mg Oral HS Lizeth Rede, CRNP      metoprolol tartrate  25 mg Oral Q12H [address] Jocelyne Hetal, CRNP      ondansetron  4 mg Intravenous Q6H PRN Lizeth Rede, CRNP      polyethylene glycol  17 g Oral Daily Lizeth Rede, CRNP      senna  1 tablet Oral Daily TANVI Avina          Today, Patient Was Seen By: TANVI Avina    **Please Note: This note may have been constructed using a voice recognition system  **

## 2021-08-08 NOTE — CASE MANAGEMENT
Case Management Progress Note    Patient name Ekta Diane  Location [facility] [address] 334/-42 MRN [id number]  : 1945 Date 2021       LOS (days): 0  Geometric Mean LOS (GMLOS) (days):   Days to GMLOS:        BUNDLE:      OBJECTIVE:  Pt is a 77y o  year old , white or  [1], female with Anabaptist preference of [address] admitted on 7508  2:31 AM  Pt is admitted to [facility] 334-[address] at [address] [place] [address] with complaints of Gross hematuria   Current admission status: Inpatient  Preferred Pharmacy:   [address]   [place] [address]  Phone: [phone] Fax: [phone]    Primary Care Provider: Angela Felder DO    Primary Insurance: MEDICARE  Secondary Insurance: [facility]    PROGRESS NOTE:     was informed that the pt has an oncology appt in the AM next door and will need some assistance getting to the appt and then she will need transportation back to [place]  The pt dtr is able to get the pt to her appt next door with the assistance of one of our wheelchairs and then the pt will need to be transported back to [facility] because she has difficulty getting in an out of the car  Shabana and Ruby Lopez discussed the dtr taking the pt to her appt and  setting up transport back to [facility] from her appt  CM called the hospital [facility] and asked if the pt dtr was able to use our wc to transport the pt to her appt next door and she stated that was fine as long as she returns the wc  The CM will need to set up transport for the pt once her appt has ended  CM called the pt dtr and informed her that she was able to use the wc  She will also need to contact the CM with the approximate time the appt will end in order for transportation to be set up for the pt to return to her home at [facility]      SHABANA updated SLIM that the pt will need to be ready by 9am for dc from the hospital   CM also spoke with nursing to make them aware of the early dc

## 2021-08-08 NOTE — PLAN OF CARE
Problem: Potential for Falls  Goal: Patient will remain free of falls  Description: INTERVENTIONS:  - Educate patient/family on patient safety including physical limitations  - Instruct patient to call for assistance with activity   - Consult OT/PT to assist with strengthening/mobility   - Keep Call bell within reach  - Keep bed low and locked with side rails adjusted as appropriate  - Keep care items and personal belongings within reach  - Initiate and maintain comfort rounds  - Make Fall Risk Sign visible to staff  - Offer Toileting every 2 Hours, in advance of need  - Initiate/Maintain bed alarm  - Obtain necessary fall risk management equipment: walker  - Apply yellow socks and bracelet for high fall risk patients  - Consider moving patient to room near nurses station  Outcome: Progressing

## 2021-08-08 NOTE — PROGRESS NOTES
Dr Isabell Landaverde ( Urology) ordered to irrigate Davis with Normal Saline to see if any bleeding or blood cloths still present  Irrigated with 100 ml of NSS and returned clear liquid without any blood or bloody clothes  Dr Isabell Landaverde made aware  Continue to monitor

## 2021-08-08 NOTE — ED NOTES
Please call Phoenix Díaz (daughter) with any updates or concerns     [place] - [phone] *call this number first*  Cell - [phone] - [address] 08/07/21 2006

## 2021-08-08 NOTE — ASSESSMENT & PLAN NOTE
Lab Results   Component Value Date    EGFR 44 08/08/2021    EGFR 49 08/07/2021    EGFR 42 07/24/2021    CREATININE 1 22 08/08/2021    CREATININE 1 12 08/07/2021    CREATININE 1 25 07/24/2021     · History of CKD stg 3  · Baseline creatinine appears to be 1 3-1 5 in the last 6 months  · Patient is well below her baseline  · Avoid nephrotoxins, hypotension  · Monitor

## 2021-08-09 ENCOUNTER — TELEPHONE (OUTPATIENT)
Dept: HEMATOLOGY ONCOLOGY | Facility: CLINIC | Age: 76
End: 2021-08-09

## 2021-08-09 VITALS
TEMPERATURE: 98.1 F | RESPIRATION RATE: 18 BRPM | OXYGEN SATURATION: 92 % | WEIGHT: 163.58 LBS | HEART RATE: 59 BPM | SYSTOLIC BLOOD PRESSURE: 111 MMHG | HEIGHT: 63 IN | DIASTOLIC BLOOD PRESSURE: 51 MMHG | BODY MASS INDEX: 28.98 KG/M2

## 2021-08-09 PROBLEM — R00.1 BRADYCARDIA: Status: RESOLVED | Noted: 2018-06-18 | Resolved: 2021-08-09

## 2021-08-09 LAB
ANION GAP SERPL CALCULATED.3IONS-SCNC: 6 MMOL/L (ref 4–13)
BUN SERPL-MCNC: 16 MG/DL (ref 5–25)
CALCIUM SERPL-MCNC: 8.5 MG/DL (ref 8.3–10.1)
CHLORIDE SERPL-SCNC: 111 MMOL/L (ref 100–108)
CO2 SERPL-SCNC: 30 MMOL/L (ref 21–32)
CREAT SERPL-MCNC: 1.29 MG/DL (ref 0.6–1.3)
ERYTHROCYTE [DISTWIDTH] IN BLOOD BY AUTOMATED COUNT: 14.4 % (ref 11.6–15.1)
GFR SERPL CREATININE-BSD FRML MDRD: 41 ML/MIN/1.73SQ M
GLUCOSE SERPL-MCNC: 98 MG/DL (ref 65–140)
HCT VFR BLD AUTO: 29.4 % (ref 34.8–46.1)
HGB BLD-MCNC: 9.1 G/DL (ref 11.5–15.4)
MCH RBC QN AUTO: 32.2 PG (ref 26.8–34.3)
MCHC RBC AUTO-ENTMCNC: 31 G/DL (ref 31.4–37.4)
MCV RBC AUTO: 104 FL (ref 82–98)
PLATELET # BLD AUTO: 142 THOUSANDS/UL (ref 149–390)
PMV BLD AUTO: 11.4 FL (ref 8.9–12.7)
POTASSIUM SERPL-SCNC: 4.5 MMOL/L (ref 3.5–5.3)
RBC # BLD AUTO: 2.83 MILLION/UL (ref 3.81–5.12)
SODIUM SERPL-SCNC: 147 MMOL/L (ref 136–145)
WBC # BLD AUTO: 5.5 THOUSAND/UL (ref 4.31–10.16)

## 2021-08-09 PROCEDURE — 99239 HOSP IP/OBS DSCHRG MGMT >30: CPT | Performed by: PHYSICIAN ASSISTANT

## 2021-08-09 PROCEDURE — 99222 1ST HOSP IP/OBS MODERATE 55: CPT | Performed by: NURSE PRACTITIONER

## 2021-08-09 PROCEDURE — 85027 COMPLETE CBC AUTOMATED: CPT | Performed by: NURSE PRACTITIONER

## 2021-08-09 PROCEDURE — 80048 BASIC METABOLIC PNL TOTAL CA: CPT | Performed by: NURSE PRACTITIONER

## 2021-08-09 RX ORDER — FUROSEMIDE 20 MG/1
20 TABLET ORAL DAILY
Refills: 0
Start: 2021-08-10

## 2021-08-09 RX ADMIN — LEVOTHYROXINE SODIUM 100 MCG: 100 TABLET ORAL at 09:26

## 2021-08-09 RX ADMIN — FUROSEMIDE 20 MG: 40 TABLET ORAL at 09:24

## 2021-08-09 RX ADMIN — DIGOXIN 125 MCG: 125 TABLET ORAL at 09:24

## 2021-08-09 RX ADMIN — CARBAMAZEPINE 100 MG: 100 TABLET, CHEWABLE ORAL at 09:27

## 2021-08-09 RX ADMIN — DIVALPROEX SODIUM 250 MG: 250 TABLET, DELAYED RELEASE ORAL at 09:24

## 2021-08-09 RX ADMIN — BUPROPION 150 MG: 150 TABLET, EXTENDED RELEASE ORAL at 09:26

## 2021-08-09 RX ADMIN — FERROUS SULFATE TAB 325 MG (65 MG ELEMENTAL FE) 325 MG: 325 (65 FE) TAB at 09:25

## 2021-08-09 NOTE — ASSESSMENT & PLAN NOTE
· Pt with hx of indwelling baca catheter  · Patient presents to the ED from [facility] with complaints of gross hematuria in her baca catheter bag after becoming combative when they tried to change it  · Patient with history of gross hematuria; was treated for it in June 2021  · During her last admission, patient underwent a cystoscopy which discovered a 2 cm nodular mass in the anterolateral wall, a biopsy was collected  She also required evacuation of clot and fulguration of bleeding points  · 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  · Pt with outpatient oncology appointment today   · Currently, hemoglobin noted to be 9 1 which is within her baseline, around 9  · CBI Initiated in the ED  · Urology following,  · Continue to hold Eliquis on discharge, follow up with PCP/oncology   Discussed risks and benefits of holding Eliquis with patient's daughter, in agreement with plan   · Maintain baca catheter to drainage, manually irrigate as needed  · Follow up with urology as an outpatient

## 2021-08-09 NOTE — ASSESSMENT & PLAN NOTE
· Mild, sodium 147 today   · Likely in setting of volume depletion   · Hold lasix x 24 hours, encourage good PO hydration   · Monitor BMP outpatient at [address] [place] [address]

## 2021-08-09 NOTE — ASSESSMENT & PLAN NOTE
· Chronic, continue digoxin 125 mcg daily and lopressor 25 mg Q12H for rate control   · With intermittent bradycardia for which patient was recently decreased on her lopressor as an outpatient with improvement  · Hrs in the 50s-60s on review  · Discussed with patient's daughter at bedside regarding risks/benefits to a/c  Currently it is recommended that patient hold Eliquis and follow up with oncology/PCP regarding holding it long term   Monitor urine output

## 2021-08-09 NOTE — TRANSPORTATION MEDICAL NECESSITY
Section I - General Information    Name of Patient: Ekta Diane                 : 1945    Medicare #: [id number]  Transport Date: 21 (PCS is valid for round trips on this date and for all repetitive trips in the 60-day range as noted below )  [facility]: Joaquina Leaver  Is the pt's stay covered under Medicare Part A (PPS/DRG)   [x]     Closest appropriate facility? If no, why is transport to more distant facility required? Yes  If hospice pt, is this transport related to pt's terminal illness? No       Section II - Medical Necessity Questionnaire  Ambulance transportation is medically necessary only if other means of transport are contraindicated or would be potentially harmful to the patient  To meet this requirement, the patient must either be "bed confined" or suffer from a condition such that transport by means other than ambulance is contraindicated by the patient's condition  The following questions must be answered by the medical professional signing below for this form to be valid:    1)  Describe the MEDICAL CONDITION (physical and/or mental) of this patient AT [address] [place] [address] that requires the patient to be transported in an ambulance and why transport by other means is contraindicated by the patient's condition:BLADDER CA, HEMATURIA, MDRO, CONFUSED    2) Is the patient "bed confined" as defined below? Yes  To be "be confined" the patient must satisfy all three of the following conditions: (1) unable to get up from bed without Assistance; AND (2) unable to ambulate; AND (3) unable to sit in a chair or wheelchair  3) Can this patient safely be transported by car or wheelchair van (i e , seated during transport without a medical attendant or monitoring)?    No    4) In addition to completing questions 1-3 above, please check any of the following conditions that apply*:   *Note: supporting documentation for any boxes checked must be maintained in the patient's medical records  If hosp-hosp transfer, describe services needed at 2nd facility not available at 1st facility? Patient is confused  Medical attendant required   Special handling/isolation/infection control precautions required   Unable to tolerate seated position for time needed to transport       Section III - Signature of Physician or Healthcare Professional  I certify that the above information is true and correct based on my evaluation of this patient, and represent that the patient requires transport by ambulance and that other forms of transport are contraindicated  I understand that this information will be used by the Centers for Medicare and Medicaid Services (CMS) to support the determination of medical necessity for ambulance services, and I represent that I have personal knowledge of the patient's condition at time of transport  [x]  If this box is checked, I also certify that the patient is physically or mentally incapable of signing the ambulance service's claim and that the institution with which I am affiliated has furnished care, services, or assistance to the patient  My signature below is made on behalf of the patient pursuant to 42 CFR §424 36(b)(4)   In accordance with 42 CFR §424 37, the specific reason(s) that the patient is physically or mentally incapable of signing the claim form is as follows:CONFUSED      Signature of Physician* or Healthcare Professional______________________________________________________________  Signature Date 08/09/21 (For scheduled repetitive transports, this form is not valid for transports performed more than 60 days after this date)    Printed Name & Credentials of Physician or Healthcare Professional (MD, , RN, etc )___JUNE VALENTIN POTTER CM _____________________________  *Form must be signed by patient's attending physician for scheduled, repetitive transports   For non-repetitive, unscheduled ambulance transports, if unable to obtain the signature of the attending physician, any of the following may sign (choose appropriate option below)  [] Physician Assistant []  Clinical Nurse Specialist [x]  Registered Nurse  []  Nurse Practitioner  [x] Discharge Planner

## 2021-08-09 NOTE — CASE MANAGEMENT
Case Management Progress Note    Patient name Shara Justice  Location [facility] [address] 334/-00 MRN [id number]  : 1945 Date 2021       LOS (days): 1  Geometric Mean LOS (GMLOS) (days):   Days to GMLOS:        BUNDLE:      OBJECTIVE:  Pt is a 77y o  year old , white or  [1], female with Mu-ism preference of [address] admitted on 4277  2:31 AM  Pt is admitted to [facility] 334-[address] at [address] [place] [address] with complaints of Gross hematuria   Current admission status: Inpatient  Preferred Pharmacy:   [facility] [address]   [place] [address]  Phone: [phone] Fax: [phone]    Primary Care Provider: Williams Simms DO    Primary Insurance: MEDICARE  Secondary Insurance: [facility]    PROGRESS NOTE:  CM spoke to [facility] at bedside who confirmed pt from [facility]  And wants her to return at d/c  Elidia Koroma to d/c  TAR[facility] [place] updated  Spoke to  [employer] [facility] for return  Pt can return to [facility] per David Cha  Referral via allscripts  CM spoke to [employer] dispatcher for BLS ambulance Await callback w/ pickup time  [facility] [place] updated

## 2021-08-09 NOTE — CASE MANAGEMENT
Case Management Progress Note    Patient name Ezekiel Martinez  Location [facility] [address] 334/-92 MRN [id number]  : 1945 Date 2021       LOS (days): 1  Geometric Mean LOS (GMLOS) (days):   Days to GMLOS:        BUNDLE:      OBJECTIVE:  Pt is a 77y o  year old , white or  [1], female with Voodoo preference of [address] admitted on 2456  2:31 AM  Pt is admitted to [facility] [address] 334[address] at [address] [place] [address] with complaints of Gross hematuria   Current admission status: Inpatient  Preferred Pharmacy:   [facility] [address]   [place] [address]  Phone: [phone] Fax: [phone]    Primary Care Provider: Saurabh Ojeda DO    Primary Insurance: MEDICARE  Secondary Insurance: [facility]    PROGRESS NOTE:  Jeff/Manager JANEE bowen texted that [facility] has to make transport arrangements from [facility] to [facility]  CM confirmed same w/ CHASE Sequeira  CM called Arlen/Aris who will make transport arrangements  CC informed CHASE Sequeira of same  CM informed Dtr Debora Cisneros at bedside

## 2021-08-09 NOTE — TELEPHONE ENCOUNTER
Patient daughter came in 8/9/21 for patients consultation appt  We couldn't see the patient because she was discharged from the hospital the same day  We offered several days with different providers, but the patients daughter wasn't available until 8/23/21    Patients daughter agreed for the Patient to berescheduled with Dr Kia Vega om 8/23/21 @ 11:20am

## 2021-08-09 NOTE — ASSESSMENT & PLAN NOTE
· Chronic, currently at baseline  · Supportive measures  · Continue Wellbutrin  mg daily and Ativan QHS

## 2021-08-09 NOTE — QUICK NOTE
Urine blood tinged   Pt admitted for gross hematuria, daughter refusing eliquis tonight    Asked RN to irrigate as recommended by urology

## 2021-08-09 NOTE — ASSESSMENT & PLAN NOTE
· Patient with history of bradycardia; was recently decreased on her Lopressor to 25 mg BID  · Heart rate stable in the 50s-60s at this time, continue lopressor with hold parameters  · Monitor

## 2021-08-09 NOTE — ASSESSMENT & PLAN NOTE
· Baseline hgb appears to be around 9 on review  · Hgb 9 1 today, within baseline   · Continue 325 mg Ferrous Sulfate BID  · Continue to hold Eliquis on discharge

## 2021-08-09 NOTE — ASSESSMENT & PLAN NOTE
Lab Results   Component Value Date    EGFR 41 08/09/2021    EGFR 44 08/08/2021    EGFR 49 08/07/2021    CREATININE 1 29 08/09/2021    CREATININE 1 22 08/08/2021    CREATININE 1 12 08/07/2021     · History of CKD stg 3  · Baseline creatinine appears to be 1 3-1 5 in the last 6 months    · Patient is well below her baseline at 1 29 today  · Avoid nephrotoxins, hypotension  · Follow up outpatient

## 2021-08-09 NOTE — CASE MANAGEMENT
Case Management Progress Note    Patient name Mustapha Mail  Location [facility] 87 [id number] MRN [id number]  : 1945 Date 2021       LOS (days): 1  Geometric Mean LOS (GMLOS) (days):   Days to GMLOS:        BUNDLE:      OBJECTIVE:  Pt is a 77y o  year old , white or  [1], female with Baptism preference of [address] admitted on 2129  2:31 AM  Pt is admitted to [facility] 87 [id number] at [address] [place] [address] with complaints of Gross hematuria   Current admission status: Inpatient  Preferred Pharmacy:   [facility] [address]   [place] [address]  Phone: [phone] Fax: [phone]    Primary Care Provider: Salvador Ramirez DO    Primary Insurance: MEDICARE  Secondary Insurance: [facility]    PROGRESS NOTE:    CM completed CMN copy to medical records bin and original and face sheet to sticker chart for transport  CM called Daughter  Pancho Bench and left voicemail message to let CM know what time pickup will be needed form [facility] back to [facility] DANG KOO notified of same

## 2021-08-09 NOTE — CONSULTS
CONSULT    Patient Name: Juan Cain  Patient MRN: [id number]  Date of Service: 8/9/2021   Date / Time Note Created: 8/9/2021 7:58 AM   Referring Provider: Arcadio Buenrostro MD    Provider Creating Note: TANVI Sagastume    PCP: Steven Craig  Attending Provider:  Arcadio Buenrostro MD    Reason for Consult: Hematuria    HPI --Juan Cain is a 29-year-old demented female with history of recently diagnosed muscle invasive bladder cancer cT2, status post last previous TURBT 06/18/2021 urinary retention chronic term indwelling Davis catheter, solitary kidney with CKD  She is on Eliquis for history of atrial fibrillation  She presented to [facility] Emergency room with gross hematuria over the weekend; not accompanied by fever, chills, flank, abdominal or suprapubic pain  She has remained afebrile, hemodynamically stable and without verbal or nonverbal indicators of pain  Hemoglobin 9 1  No leukocytosis or acute kidney injury  She was referred to [facility] after recent discussion of treatment options  Unfortunately, patient is a poor surgical candidate for radical cystectomy  Patient has appointment today with medical-oncology at 9:30 a m  Savita Marie Davis catheter remains in-situ and patent for wanda--clear blush light urine  No evidence of clots  Urologic consultation requested for further management recommendations    Source:the chart                   Active Problems:    Patient Active Problem List   Diagnosis    Elevated troponin    COPD (chronic obstructive pulmonary disease) (HCC)    Chronic diastolic heart failure (HCC)    Paroxysmal atrial flutter (HCC)    Back pain    Dementia (HCC)    Hyperlipidemia    Urinary tract infection associated with catheterization of urinary tract ([facility] )    RSV infection    Ambulatory dysfunction    Stage 3 chronic kidney disease    Lower extremity edema    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    JESENIA (obstructive sleep apnea)    Intermittent claudication of right lower extremity due to atherosclerosis (HCC)    Atrial fibrillation (HCC)    Chronic indwelling Davis catheter    Bradycardia    Essential hypertension    Macrocytic anemia    Aortoiliac occlusive disease ([facility] )    HCAP (healthcare-associated pneumonia)    Coronary artery disease involving native coronary artery of native heart without angina pectoris    Hypothyroidism    PAD (peripheral artery disease) ([facility] )    History of cerebral aneurysm repair    Urinary retention    TAM (acute kidney injury) ([facility] )    Hypertensive kidney disease with stage 3 chronic kidney disease (HCC)    Azotemia    Paroxysmal atrial fibrillation (HCC)    Vomiting    Pneumonia due to COVID-19 virus    Hypoxia    COVID-19    Gram-negative bacteremia    Sepsis (HCC)    Gross hematuria    Acute blood loss anemia    Urothelial carcinoma (HCC)    Malignant neoplasm of lateral wall of urinary bladder (HCC)    Obesity, morbid (HCC)    Vitamin D deficiency    Urinary tract infection    Chronic systolic heart failure (HCC)    Acute DVT (deep venous thrombosis) (HCC)             Impressions  History of bladder cancer   Hematuria--secondary to previous, chronic intermittent and exacerbated by requirement for anticoagulation  Neurogenic bladder--chronic Davis status    Recommendations  1  Medical optimization  2  Maintain Davis catheter to straight drainage--do not remove  3  Can manually irrigate  4  Patient will have some degree of hematuria  This is acceptable given patient's clinical situation  5  Would strongly recommend holding Eliquis and reviewing risks versus benefits of continued anticoagulation  6  Follow-up with Radiation and Medical Oncology  7   stable for discharge              Past Medical History:   Diagnosis Date    Arthritis     Atrial fibrillation ([facility] )     Brain aneurysm     CAD (coronary artery disease)     Cardiac disease     had stents 12 years ago [place] [id number] [address] Chronic kidney disease     CKD (chronic kidney disease) stage 3, GFR 30-59 ml/min (HCC) ckd    COPD (chronic obstructive pulmonary disease) (HCC)     Coronary artery disease     Dementia (HCC)     Diastolic CHF, chronic (HCC)     Disease of thyroid gland     GERD (gastroesophageal reflux disease)     Hyperlipidemia     Hyperlipidemia     Hypertension     Hypothyroidism     Migraine     PAD (peripheral artery disease) (HCC)     Psychiatric disorder     Renal disorder     Seizures ([facility] )     Stroke ([facility] )     TIA (transient ischemic attack)        Past Surgical History:   Procedure Laterality Date    APPENDECTOMY      ARTERIOGRAM N/A 12/7/2018    Procedure: ARTERIOGRAM WITH STENT PLACEMENT;  Surgeon: Jr Reddy MD;  [facility]: [facility];  Service: Vascular    BLADDER TUMOR EXCISION     [address]  clip right frontal lobe    CARDIAC SURGERY      COLONOSCOPY      CORONARY STENT PLACEMENT      5 stents    EYE SURGERY      IR AORTAGRAM WITH RUN-OFF  12/7/2018    MANDIBLE FRACTURE SURGERY      LA CYSTOURETHROSCOPY W/IRRIG & EVAC CLOTS N/A 6/19/2021    Procedure: CYSTOSCOPY EVACUATION OF CLOTS;  Surgeon: Reyes Rios MD;  [facility];  Service: Urology    TONSILLECTOMY      TRANSURETHRAL RESECTION OF BLADDER TUMOR N/A 6/23/2021    Procedure: TRANSURETHRAL RESECTION OF BLADDER TUMOR (TURBT);   Surgeon: Reyes Rios MD;  [facility];  Service: Urology    TUBAL LIGATION      UTERINE FIBROID SURGERY         Family History   Problem Relation Age of Onset    Heart disease Father     Parkinsonism Father     Macular degeneration Mother     Glaucoma Mother     Diabetes Brother     Heart disease Brother        Social History     Socioeconomic History    Marital status:      Spouse name: Not on file    Number of children: 3    Years of education: Not on file    Highest education level: Not on file Occupational History    Occupation: retired   Tobacco Use    Smoking status: Former Smoker     Packs/day: 5 00     Years: 40 00     Pack years: 200 00     Quit date: 2007     Years since quittin 9    Smokeless tobacco: Never Used   Vaping Use    Vaping Use: Never used   Substance and Sexual Activity    Alcohol use: Not Currently     Alcohol/week: 0 0 standard drinks    Drug use: No    Sexual activity: Not Currently   Other Topics Concern    Not on file   Social History Narrative    Not on file     Social Determinants of Health     Financial Resource Strain:     Difficulty of Paying Living Expenses:    Food Insecurity:     Worried About Running Out of Food in the Last Year:     [address] in the Last Year:    Transportation Needs:     Lack of Transportation (Medical):  Lack of Transportation (Non-Medical):    Physical Activity:     Days of Exercise per Week:     Minutes of Exercise per Session:    Stress:     Feeling of Stress :    Social Connections:     Frequency of Communication with Friends and Family:     Frequency of Social Gatherings with Friends and Family:     Attends Faith Services:     Active Member of Clubs or Organizations:     Attends Club or Organization Meetings:     Marital Status:    Intimate Partner Violence:     Fear of Current or Ex-Partner:     Emotionally Abused:     Physically Abused:     Sexually Abused:         Allergies   Allergen Reactions    Dye [Iodinated Diagnostic Agents] Other (See Comments)     allergy    Spiriva Handihaler [Tiotropium Bromide Monohydrate] Swelling    Ramipril Facial Swelling    Penicillins      unsure       Review of Systems  Review of Systems - History obtained from unobtainable from patient due to mental status       Chart Review   Allergies, current medications, history, problem list    Vital Signs  /51   Pulse 59   Temp 98 1 °F (36 7 °C)   Resp 18   Ht 5' 3" (1 6 m)   Wt 74 2 kg (163 lb 9 3 oz) SpO2 92%   BMI 28 98 kg/m²     Physical Exam  General appearance: alert and oriented, in no acute distress, appears stated age and cooperative  Head: Normocephalic, without obvious abnormality, atraumatic  Neck: no adenopathy, no carotid bruit, no JVD, supple, symmetrical, trachea midline and thyroid not enlarged, symmetric, no tenderness/mass/nodules  Lungs: diminished breath sounds  Heart: regular rate and rhythm, S1, S2 normal, no murmur, click, rub or gallop  Abdomen: soft, non-tender; bowel sounds normal; no masses,  no organomegaly  Extremities: extremities normal, warm and well-perfused; no cyanosis, clubbing, or edema  Pulses: 2+ and symmetric  Neurologic: Grossly normal  Davis--see below            Laboratory Studies  Lab Results   Component Value Date    HGBA1C 6 5 12/31/2019    K 4 5 08/09/2021     (H) 08/09/2021    CO2 30 08/09/2021    CREATININE 1 29 08/09/2021    BUN 16 08/09/2021    MG 2 4 08/08/2021    PHOS 4 2 (H) 08/08/2021     Lab Results   Component Value Date    WBC 5 50 08/09/2021    RBC 2 83 (L) 08/09/2021    HGB 9 1 (L) 08/09/2021    HCT 29 4 (L) 08/09/2021     (H) 08/09/2021    MCH 32 2 08/09/2021    RDW 14 4 08/09/2021     (L) 08/09/2021       Imaging and Other Studies  )XR chest portable    Result Date: 7/21/2021  Narrative: CHEST INDICATION:   hypoxia  Patient has confirmed COVID-19  COMPARISON:  July 19, 2021 EXAM PERFORMED/VIEWS:  XR CHEST PORTABLE FINDINGS: Heart enlarged  Mild prominence of the upper lobe pulmonary vessels  Patchy opacities throughout both lungs, similar to the last exam   Small bilateral pleural effusions  No evidence of pneumothorax  Osseous structures appear within normal limits for patient age  Impression: Findings consistent with CHF with pleural effusions and mild pulmonary edema  In view of the patient's positive diagnosis of Covid, coexisting Covid pneumonia should be considered   Workstation performed: [id number]     XR chest 1 view portable    Result Date: 7/19/2021  Narrative: CHEST INDICATION:   SOB  60% on room air at nursing home  Placed on CPAP  Tachypnea  Labored breathing  COMPARISON:  6/23/2021 radiographs and CT EXAM PERFORMED/VIEWS:  XR CHEST PORTABLE FINDINGS: Lung volumes are preserved  Patient is rotated to the left  Bilateral, relatively symmetric central airspace and interstitial opacities  No pneumothorax  Evidence of small pleural effusions  Similar cardiomegaly  No acute cardiomediastinal findings  No acute osseous or soft tissue pathology  The study was marked in [facility] for immediate notification  Impression: Bilateral lung opacities and effusions most compatible with edema  Workstation performed: [id number]     VAS lower limb venous duplex study, complete bilateral    Result Date: 7/21/2021  Narrative:  [facility] REPORT CLINICAL: Indications: Dyspnea [R06 02]  Elevated D-Dimer and history of DVT  Positive covid patient  Operative History: 2018-12-07 Angioplasty b/l TEMO with kissing stents 2018-12-07 Right EIA Stent 2003-01-01 Cardiac Stents [id number] Cerebral Aneurysm Repair Risk Factors: Obesity, HTN, HLD, CKD, PAD, CAD and previous smoking (quit >10yrs ago)  FINDINGS:  Right    Impression              CFV      Non Occlusive Thrombus  FV Prox  Non Occlusive Thrombus     CONCLUSION:  Impression: RIGHT LOWER LIMB: Limited Evidence of acute  deep vein thrombosis in the CFV and proximal FV  Mid and distal femoral vein and popliteal veins are patent without evidence of DVT  LEFT LOWER LIMB: Limited No gross evidence of acute deep vein thrombosis in the CFV, FV, and Popliteal Vein  Limited study performed covid positive patient  technical results given to Dr Davis Gay at time of scan    SIGNATURE: Electronically Signed by: Silvino Pickett on 2021-07-21 08:09:59 AM        Medications   Current Facility-Administered Medications   Medication Dose Route Frequency Provider Last Rate    acetaminophen  650 mg Oral Q6H PRN Dumont Drain, CRNP      aluminum-magnesium hydroxide-simethicone  30 mL Oral Q6H PRN Dumont Drain, CRNP      apixaban  5 mg Oral BID Dumont Drain, CRNP      buPROPion  150 mg Oral Daily Dumont Drain, CRNP      carBAMazepine  100 mg Oral TID Dumont Drain, CRNP      digoxin  125 mcg Oral Daily Dumont Drain, CRNP      divalproex sodium  250 mg Oral Q12H [address] Dumont Drain, CRNP      docusate sodium  100 mg Oral BID Dumont Drain, CRNP      ferrous sulfate  325 mg Oral BID Dumont Drain, CRNP      furosemide  20 mg Oral Daily Dumont Drain, CRNP      levothyroxine  100 mcg Oral Daily Dumont Drain, CRNP      LORazepam  0 5 mg Oral HS Dumont Drain, CRNP      melatonin  6 mg Oral HS Dumont Drain, CRNP      metoprolol tartrate  25 mg Oral Q12H [address] Jocelyne Fong, CRNP      ondansetron  4 mg Intravenous Q6H PRN Dumont Drain, CRNP      polyethylene glycol  17 g Oral Daily Dumont Drain, CRNP      senna  1 tablet Oral Daily Dumont Drain, CRNP               Mesha Garcia, [address] [place] [address]

## 2021-08-09 NOTE — ASSESSMENT & PLAN NOTE
Wt Readings from Last 3 Encounters:   08/08/21 74 2 kg (163 lb 9 3 oz)   07/27/21 72 kg (158 lb 11 7 oz)   07/12/21 76 2 kg (168 lb)     · Chronic  · Appears to be euvolemic  · Continue to monitor daily weights, I&Os  · Continue lasix 20 mg daily on discharge in 24 hours

## 2021-08-09 NOTE — DISCHARGE INSTRUCTIONS
PER UROLOGY  Cath Care instructions---Maintain catheter to straight drainage  May use leg bag and shower  May flush daily prn using Vivien syringe and 120 ml NSS  May use more saline ad neema to prevent/treat cath obstruction  Change thereafter using 16 FR baca in 10 days 8/19  Catheter placed at [facility] on  8/7/2021  Patient will some degree of hematuria given history of bladder cancer and Eliquis administration  Continue manual flushes daily  Report to the emergency room for viscous hematuria and/or clot retention only!!!!!!!  Please follow up with your primary care provider within one week  Please follow up with BMP in a few days to monitor sodium levels   Encourage good fluid hydration, hold lasix x 24 hours  Continue to hold Eliquis until follow up with oncology/PCP  Manually irrigate catheter for bleeding  If you begin to have any worsening blood in the urine, lightheadedness, shortness of breath please come back to the hospital for further evaluation   Anemia   WHAT YOU NEED TO KNOW:   Anemia is a low number of red blood cells or a low amount of hemoglobin in your red blood cells  Hemoglobin is a protein that helps carry oxygen throughout your body  Red blood cells use iron to create hemoglobin  Anemia may develop if your body does not have enough iron  It may also develop if your body does not make enough red blood cells or they die faster than your body can make them  DISCHARGE INSTRUCTIONS:   Call 911 or have someone call 911 for any of the following:   · You lose consciousness  · You have severe chest pain  Seek care immediately if:   · You have dark or bloody bowel movements  Contact your healthcare provider if:   · Your symptoms are worse, even after treatment  · You have questions or concerns about your condition or care  Medicines:   · Iron or folic acid supplements  help increase your red blood cell and hemoglobin levels       · Vitamin B12 injections  may help boost your red blood cell level and decrease your symptoms  Ask your healthcare provider how to inject B12  · Take your medicine as directed  Contact your healthcare provider if you think your medicine is not helping or if you have side effects  Tell him of her if you are allergic to any medicine  Keep a list of the medicines, vitamins, and herbs you take  Include the amounts, and when and why you take them  Bring the list or the pill bottles to follow-up visits  Carry your medicine list with you in case of an emergency  Prevent anemia:  Eat healthy foods rich in iron and vitamin C  Nuts, meat, dark leafy green vegetables, and beans are high in iron and protein  Vitamin C helps your body absorb iron  Foods rich in vitamin C include oranges and other citrus fruits  Ask your healthcare provider for a list of other foods that are high in iron or vitamin C  Ask if you need to be on a special diet  Follow up with your healthcare provider as directed:  Write down your questions so you remember to ask them during your visits  © Copyright [employer] 2021 Information is for End User's use only and may not be sold, redistributed or otherwise used for commercial purposes  All illustrations and images included in CareNotes® are the copyrighted property of A D A M , Inc  or [facility] [employer]   The above information is an  only  It is not intended as medical advice for individual conditions or treatments  Talk to your doctor, nurse or pharmacist before following any medical regimen to see if it is safe and effective for you  Hematuria   WHAT YOU NEED TO KNOW:   What is hematuria? Hematuria is blood in your urine  Your urine may be bright red to dark brown  What other signs and symptoms might I have with hematuria?    · Fever    · Nausea and vomiting    · Pain or bruising on your lower back or sides    · Pain or burning when you urinate    · More urination than usual, or the need to urinate right away    · Blood clots in the toilet after you urinate    What causes hematuria? Ask your healthcare provider for more information about these and other causes of hematuria:  · Urinary tract infection    · Kidney or bladder stones    · Swollen prostate    · Kidney disease    · Abdomen or pelvic injury    · Kidney, bladder, or prostate cancer    · Intense exercise    How is hematuria diagnosed? Your healthcare provider will ask when you first saw a change in the color of your urine  Tell him or her about any medical conditions or medicines you take  Some medicines can damage your kidneys or increase your risk for bleeding  You may need any of the following:  · Blood and urine tests  may show infection and how well your kidneys are working  · An ultrasound or CT  may show the cause of your hematuria  You may be given contrast liquid to help your urinary tract show up better in the pictures  Tell the healthcare provider if you have ever had an allergic reaction to contrast liquid  · A cystoscopy  may show problems inside your bladder  The cystoscope is a long tube with a lens and a light on the end  How is hematuria treated? Hematuria may go away without treatment  You may need medicines to treat an infection  Treatment depends on the cause of your hematuria  Ask your healthcare provider for more information about the treatment you may need  How can I manage my symptoms? Drink liquids as directed  You may need to drink extra liquids to help flush the blood from your body through your urine  Water is the best liquid to drink  Ask how much liquid to drink each day and which liquids are best for you  When should I seek immediate care? · You have blood in your urine after a new injury, such as a fall  · You have severe back or side pain that does not go away with treatment  When should I call my doctor? · You are urinating very small amounts or not at all      · You feel like you cannot empty your bladder  · You have a fever that gets worse or does not go away with treatment  · You cannot keep liquids or medicines down  · Your urine gets darker, even after you drink extra liquids  · You have questions or concerns about your condition, treatment, or care  CARE AGREEMENT:   You have the right to help plan your care  Learn about your health condition and how it may be treated  Discuss treatment options with your healthcare providers to decide what care you want to receive  You always have the right to refuse treatment  The above information is an  only  It is not intended as medical advice for individual conditions or treatments  Talk to your doctor, nurse or pharmacist before following any medical regimen to see if it is safe and effective for you  © Copyright [employer] 2021 Information is for End User's use only and may not be sold, redistributed or otherwise used for commercial purposes   All illustrations and images included in CareNotes® are the copyrighted property of A D A M , Inc  or [address] [place] [address] Onsite Care

## 2021-08-09 NOTE — DISCHARGE SUMMARY
[facility]  Discharge- Ekta Blend [birth year], 77 y o  female MRN: [id number]  Unit/Bed#: -02 Encounter: [id number]  Primary Care Provider: Angela Felder DO   Date and time admitted to hospital: 8/7/2021  2:31 AM     DOS: 8/9/2021  * Gross hematuria  Assessment & Plan  · Pt with hx of indwelling baca catheter  · Patient presents to the ED from [facility] with complaints of gross hematuria in her baca catheter bag after becoming combative when they tried to change it  · Patient with history of gross hematuria; was treated for it in June 2021  · During her last admission, patient underwent a cystoscopy which discovered a 2 cm nodular mass in the anterolateral wall, a biopsy was collected  She also required evacuation of clot and fulguration of bleeding points  · 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  · Pt with outpatient oncology appointment today   · Currently, hemoglobin noted to be 9 1 which is within her baseline, around 9  · CBI Initiated in the ED  · Urology following,  · Continue to hold Eliquis on discharge, follow up with PCP/oncology   Discussed risks and benefits of holding Eliquis with patient's daughter, in agreement with plan   · Maintain baca catheter to drainage, manually irrigate as needed  · Follow up with urology as an outpatient     Hypothyroidism  Assessment & Plan  · Continue levothyroxine 100 mcg daily     Macrocytic anemia  Assessment & Plan  · Baseline hgb appears to be around 9 on review  · Hgb 9 1 today, within baseline   · Continue 325 mg Ferrous Sulfate BID  · Continue to hold Eliquis on discharge    Essential hypertension  Assessment & Plan  · BP appears stable on review   · Continue Lopressor 25 mg BID and lasix 20 mg daily   · Monitor    Atrial fibrillation (HCC)  Assessment & Plan  · Chronic, continue digoxin 125 mcg daily and lopressor 25 mg Q12H for rate control   · With intermittent bradycardia for which patient was recently decreased on her lopressor as an outpatient with improvement  · Hrs in the 50s-60s on review  · Discussed with patient's daughter at bedside regarding risks/benefits to a/c  Currently it is recommended that patient hold Eliquis and follow up with oncology/PCP regarding holding it long term  Monitor urine output     Stage 3 chronic kidney disease  Assessment & Plan  Lab Results   Component Value Date    EGFR 41 08/09/2021    EGFR 44 08/08/2021    EGFR 49 08/07/2021    CREATININE 1 29 08/09/2021    CREATININE 1 22 08/08/2021    CREATININE 1 12 08/07/2021     · History of CKD stg 3  · Baseline creatinine appears to be 1 3-1 5 in the last 6 months  · Patient is well below her baseline at 1 29 today  · Avoid nephrotoxins, hypotension  · Follow up outpatient     Hypernatremia  Assessment & Plan  · Mild, sodium 147 today   · Likely in setting of volume depletion   · Hold lasix x 24 hours, encourage good PO hydration   · Monitor BMP outpatient at [facility] facility     [facility])  Assessment & Plan  · Chronic, currently at baseline  · Supportive measures  · Continue Wellbutrin  mg daily and Ativan QHS    Chronic diastolic heart failure (HCC)  Assessment & Plan  Wt Readings from Last 3 Encounters:   08/08/21 74 2 kg (163 lb 9 3 oz)   07/27/21 72 kg (158 lb 11 7 oz)   07/12/21 76 2 kg (168 lb)     · Chronic  · Appears to be euvolemic  · Continue to monitor daily weights, I&Os  · Continue lasix 20 mg daily on discharge in 24 hours    Bradycardia-resolved as of 8/9/2021  Assessment & Plan  · Patient with history of bradycardia; was recently decreased on her Lopressor to 25 mg BID  · Heart rate stable in the 50s-60s at this time, continue lopressor with hold parameters  · Monitor      Discharging Physician / Practitioner: Jacob Busby PA-C  PCP: Kole Mckeon DO  Admission Date:   Admission Orders (From admission, onward)     Ordered        08/08/21 1142  Inpatient Admission  Once         08/07/21 0630  Place in Observation  Once                   Discharge Date: 08/09/21    Medical Problems     Resolved Problems  Date Reviewed: 8/9/2021        Resolved    Bradycardia 8/9/2021     Resolved by  Mariam Chadwick PA-C                Consultations During Hospital Stay:  · Urology    Procedures Performed:   · None    Significant Findings / Test Results:   · Hemoglobin 9 1, stable  · Sodium 147 on discharge, follow up with BMP in a few days to monitor    Incidental Findings:   · None     Test Results Pending at Discharge (will require follow up): · None     Outpatient Tests Requested:  · Obtain BMP in a few days to monitor sodium levels, encourage good PO hydration and hold lasix x 24 hours    Complications:  None    Reason for Admission: Gross hematuria     Hospital Course: Kirby Connolly is a 77 y o  female patient with significant past medical history of anemia, atrial fibrillation on Eliquis, hyperlipidemia, dementia, chronic indwelling Davis catheter, CHF who originally presented to the hospital on 8/7/2021 due to gross hematuria  Patient became combative at nursing facility when they attempted to change her Davis catheter  She has a history of bladder tumor that was positive for high-grade urothelial cancer on previous cystoscopy  She was admitted and Davis catheter was irrigated  Urine returned to yellow color and patient was resumed on her Eliquis with additional hematuria noted  Therefore was advised the patient continue local Eliquis on discharge and follow-up closely with her oncologist for which there is appointment in the morning on day of discharge  Patient's hemoglobin was stable and patient was seen by Urology prior to discharge and was cleared for outpatient follow-up  Facility and family are to manually irrigate catheter and monitor urine output  Patient was discharged in stable condition  For additional information please refer to medical records    Medication changes include:  Discontinuation of Eliquis      Please see above list of diagnoses and related plan for additional information  Condition at Discharge: stable     Discharge Day Visit / Exam:     Subjective: Pt reports that she is feeling well  She denies any complaints or pain  Underlying dementia  Pt's daughter reports that she is going to the oncology appointment this morning  Reports that the patient was previously on hospice about 1 year ago but was taken off issues doing well  States that the patient's goal right now is to make her grandson's wedding in a few days  Vitals: Blood Pressure: 111/51 (08/09/21 0716)  Pulse: 59 (08/09/21 0716)  Temperature: 98 1 °F (36 7 °C) (08/09/21 0716)  Temp Source: Axillary (refuses to do oral temp) (08/08/21 0741)  Respirations: 18 (08/08/21 2311)  Height: 5' 3" (160 cm) (08/07/21 2130)  Weight - Scale: 74 2 kg (163 lb 9 3 oz) (08/08/21 0600)  SpO2: 92 % (08/09/21 0716)  Exam:   Physical Exam  Vitals reviewed  Constitutional:       General: She is not in acute distress  Appearance: She is not toxic-appearing  Comments: Patient is in no acute distress lying in her hospital bed resting comfortably  Baseline dementia  Davis catheter in place with punch colored urine   HENT:      Head: Normocephalic and atraumatic  Eyes:      Extraocular Movements: Extraocular movements intact  Conjunctiva/sclera: Conjunctivae normal    Cardiovascular:      Rate and Rhythm: Normal rate and regular rhythm  Pulses: Normal pulses  Pulmonary:      Effort: Pulmonary effort is normal  No respiratory distress  Breath sounds: No wheezing  Abdominal:      General: Bowel sounds are normal  There is no distension  Palpations: Abdomen is soft  Tenderness: There is no abdominal tenderness  There is no guarding  Musculoskeletal:      Right lower leg: No edema  Left lower leg: No edema  Skin:     General: Skin is warm and dry  Findings: No erythema     Neurological:      Mental Status: She is alert  Discussion with Family:  Discussed with patient and patient's daughter at bedside regarding plan of care  Discussed close follow-up with Oncology and PCP regarding long-term anticoagulation  Advised holding Eliquis over the next few days and monitoring urine output  Advised to come back to the hospital if there is any obstruction of the urinary catheter  Pt and pt's daughter in agreement to plan and verbalized understanding  Discharge instructions/Information to patient and family:   See after visit summary for information provided to patient and family  Provisions for Follow-Up Care:  See after visit summary for information related to follow-up care and any pertinent home health orders  Disposition:     Autumn Rossi at [employer] to [facility]:   · Not Applicable to this Patient - Not Applicable to this Patient    Planned Readmission: None     Discharge Statement:  I spent 50 minutes discharging the patient  This time was spent on the day of discharge  I had direct contact with the patient on the day of discharge  Greater than 50% of the total time was spent examining patient, answering all patient questions, arranging and discussing plan of care with patient as well as directly providing post-discharge instructions  Additional time then spent on discharge activities  Discharge Medications:  See after visit summary for reconciled discharge medications provided to patient and family        ** Please Note: This note has been constructed using a voice recognition system **

## 2021-08-13 NOTE — PHYSICIAN ADVISOR
Current patient class: Inpatient  The patient is currently on Hospital Day: 3 at [address]      The patient was admitted to the hospital at 11:42 AM on 8/8/21 for the following diagnosis:  Bladder cancer ([place] [address] ) [C67 9]  Gross hematuria [R31 0]  Problem with Baca catheter, initial encounter ([place] [address] ) [S73  9XXA]  Hematuria, unspecified type [R31 9]  Problem with urinary catheter ([place] [address] ) [Y78  9XXA]       There was documentation in the medical record of an expected length of stay of at least 2 midnights  The patient was therefore expected to satisfy the 2 midnight benchmark and given the 2 midnight presumption was appropriate for INPATIENT ADMISSION  Given this expectation of a satisfying stay, CMS instructs us that the patient is most often appropriate for inpatient admission under part A provided medical necessity is documented in the chart  After review of the relevant documentation, labs, vital signs and test results, the patient is appropriate for INPATIENT ADMISSION  Admission to the hospital as an inpatient is a complex decision making process which requires the practitioner to consider the patients presenting complaint, history and physical examination and all relevant testing  With this in mind, in this case, the patient was deemed appropriate for INPATIENT ADMISSION  After review of the documentation and testing available at the time of the admission, I concur with this clinical determination of medical necessity  For the reason noted, the patient was discharged before reaching 2 midnights as an inpatient  Rationale is as follows: The patient is a 77 yrs old Female who presented to the ED at 8/7/2021  2:31 AM with a chief complaint of Urinary Catheter Problem (Patient arrived from [facility] via EMS  Per EMS facility trying to change patients baca tonight and patient refusing and becoming combative  )   The patient was found to have gross hematuria    Patient on day 2 continued to have hematuria need to be manually irrigated  Urology consultation was also requested  Given the need for further irrigation for patient who came in with hematuria with Davis catheter, the patient did cross the 2 midnight benchmark set by Medicare is inpatient status appropriate      The patients vitals on arrival were   ED Triage Vitals   Temperature Pulse Respirations Blood Pressure SpO2   08/07/21 0235 08/07/21 0235 08/07/21 0235 08/07/21 0235 08/07/21 0235   97 9 °F (36 6 °C) 60 18 130/60 94 %      Temp Source Heart Rate Source Patient Position - Orthostatic VS BP Location FiO2 (%)   08/07/21 0235 08/07/21 0235 08/07/21 0545 08/07/21 0545 --   Oral Monitor Lying Right arm       Pain Score       08/07/21 1803       No Pain           Past Medical History:   Diagnosis Date    Arthritis     Atrial fibrillation ([facility] )     Brain aneurysm     CAD (coronary artery disease)     Cardiac disease     had stents 12 years ago in [facility]    Chronic kidney disease     CKD (chronic kidney disease) stage 3, GFR 30-59 ml/min ([facility]) ckd    COPD (chronic obstructive pulmonary disease) ([facility])     Coronary artery disease     Dementia ([facility])     Diastolic CHF, chronic ([facility])     Disease of thyroid gland     GERD (gastroesophageal reflux disease)     Hyperlipidemia     Hyperlipidemia     Hypertension     Hypothyroidism     Migraine     PAD (peripheral artery disease) ([facility])     Psychiatric disorder     Renal disorder     Seizures ([facility] )     Stroke [facility]     TIA (transient ischemic attack)      Past Surgical History:   Procedure Laterality Date    APPENDECTOMY      ARTERIOGRAM N/A 12/7/2018    Procedure: ARTERIOGRAM WITH STENT PLACEMENT;  Surgeon: Aayush Reddy MD;  [facility]: [facility];  Service: Vascular    BLADDER TUMOR EXCISION      BRAIN SURGERY      1998  clip right frontal lobe    CARDIAC SURGERY      COLONOSCOPY      CORONARY STENT PLACEMENT      5 stents    EYE SURGERY      IR AORTAGRAM WITH RUN-OFF  12/7/2018    MANDIBLE FRACTURE SURGERY      UT CYSTOURETHROSCOPY W/IRRIG & EVAC CLOTS N/A 6/19/2021    Procedure: CYSTOSCOPY EVACUATION OF CLOTS;  Surgeon: Hussain Edwards MD;  Location: [facility];  Service: Urology    TONSILLECTOMY      TRANSURETHRAL RESECTION OF BLADDER TUMOR N/A 6/23/2021    Procedure: TRANSURETHRAL RESECTION OF BLADDER TUMOR (TURBT); Surgeon: Hussain Edwards MD;  [facility]: [facility];  Service: Urology    TUBAL LIGATION      UTERINE FIBROID SURGERY             Consults have been placed to:   IP CONSULT TO UROLOGY    Vitals:    08/08/21 1001 08/08/21 2000 08/08/21 2311 08/09/21 0716   BP: 115/52  139/69 111/51   Pulse: 59  69 59   Resp:   18    Temp:   98 5 °F (36 9 °C) 98 1 °F (36 7 °C)   TempSrc:       SpO2: 91% (!) 86% 92% 92%   Weight:       Height:           Most recent labs:    No results for input(s): WBC, HGB, HCT, PLT, K, NA, CALCIUM, BUN, CREATININE, LIPASE, AMYLASE, INR, TROPONINI, CKTOTAL, AST, ALT, ALKPHOS, BILITOT in the last 72 hours  Scheduled Meds:  Continuous Infusions:No current facility-administered medications for this encounter  PRN Meds:      Surgical procedures (if appropriate):

## 2021-08-23 ENCOUNTER — PREP FOR PROCEDURE (OUTPATIENT)
Dept: INTERVENTIONAL RADIOLOGY/VASCULAR | Facility: CLINIC | Age: 76
End: 2021-08-23

## 2021-08-23 ENCOUNTER — CONSULT (OUTPATIENT)
Dept: HEMATOLOGY ONCOLOGY | Facility: CLINIC | Age: 76
End: 2021-08-23
Payer: MEDICARE

## 2021-08-23 ENCOUNTER — CONSULT (OUTPATIENT)
Dept: PALLIATIVE MEDICINE | Facility: CLINIC | Age: 76
End: 2021-08-23
Payer: MEDICARE

## 2021-08-23 VITALS — BODY MASS INDEX: 29.41 KG/M2 | WEIGHT: 166 LBS

## 2021-08-23 VITALS
DIASTOLIC BLOOD PRESSURE: 88 MMHG | TEMPERATURE: 98.7 F | RESPIRATION RATE: 16 BRPM | OXYGEN SATURATION: 98 % | SYSTOLIC BLOOD PRESSURE: 138 MMHG | WEIGHT: 166 LBS | BODY MASS INDEX: 29.41 KG/M2 | HEIGHT: 63 IN | HEART RATE: 77 BPM

## 2021-08-23 DIAGNOSIS — R31.9 HEMATURIA, UNSPECIFIED TYPE: ICD-10-CM

## 2021-08-23 DIAGNOSIS — J41.0 SIMPLE CHRONIC BRONCHITIS (HCC): Chronic | ICD-10-CM

## 2021-08-23 DIAGNOSIS — C67.2 MALIGNANT NEOPLASM OF LATERAL WALL OF URINARY BLADDER (HCC): Primary | ICD-10-CM

## 2021-08-23 DIAGNOSIS — I48.0 PAROXYSMAL ATRIAL FIBRILLATION (HCC): ICD-10-CM

## 2021-08-23 DIAGNOSIS — N18.32 STAGE 3B CHRONIC KIDNEY DISEASE (HCC): Chronic | ICD-10-CM

## 2021-08-23 DIAGNOSIS — R91.1 LUNG NODULE: ICD-10-CM

## 2021-08-23 DIAGNOSIS — I82.4Y9 DEEP VEIN THROMBOSIS (DVT) OF PROXIMAL LOWER EXTREMITY, UNSPECIFIED CHRONICITY, UNSPECIFIED LATERALITY (HCC): ICD-10-CM

## 2021-08-23 DIAGNOSIS — Z71.89 GOALS OF CARE, COUNSELING/DISCUSSION: ICD-10-CM

## 2021-08-23 DIAGNOSIS — Q60.0 CONGENITAL ABSENCE OF ONE KIDNEY: ICD-10-CM

## 2021-08-23 DIAGNOSIS — I50.22 CHRONIC SYSTOLIC HEART FAILURE (HCC): ICD-10-CM

## 2021-08-23 DIAGNOSIS — C68.9 UROTHELIAL CARCINOMA (HCC): Primary | ICD-10-CM

## 2021-08-23 DIAGNOSIS — I82.411 ACUTE DEEP VEIN THROMBOSIS (DVT) OF FEMORAL VEIN OF RIGHT LOWER EXTREMITY (HCC): Primary | ICD-10-CM

## 2021-08-23 DIAGNOSIS — F02.81 DEMENTIA ASSOCIATED WITH OTHER UNDERLYING DISEASE WITH BEHAVIORAL DISTURBANCE (HCC): ICD-10-CM

## 2021-08-23 DIAGNOSIS — D64.9 ANEMIA, UNSPECIFIED TYPE: ICD-10-CM

## 2021-08-23 PROCEDURE — 99204 OFFICE O/P NEW MOD 45 MIN: CPT | Performed by: INTERNAL MEDICINE

## 2021-08-23 PROCEDURE — 99214 OFFICE O/P EST MOD 30 MIN: CPT | Performed by: STUDENT IN AN ORGANIZED HEALTH CARE EDUCATION/TRAINING PROGRAM

## 2021-08-23 NOTE — PROGRESS NOTES
HEMATOLOGY / ONCOLOGY CLINIC NOTE    Primary Care Provider: Sherlene Sandifer, DO  Referring Provider:    MRN: [id number]  : 1945    Reason for Encounter:  Chief Complaint   Patient presents with    Follow-up         History of Hematology / Oncology Illness: Sherryle Righter is a 77 y o  female who came in  to establish care with oncology    1, muscle invasive bladder cancer    - presented with 2 month history of hematuria  Cystoscopy in late  identified high-grade bladder cancer invaded into muscle layer     - from  to 2021, patient has been hospitalized 3 times for hematuria, COVID, 3 times    2, right leg DVT  - diagnosed on 2021, patient does has chronic leg swelling, wheelchair bound, plus ongoing malignancy are all risk factors  Has been on of Eliquis however developed significant hematuria  Time wise Eliquis is associated with hematuria, and once Eliquis stopped hematuria resolves  As for now patient not on anticoagulation       3, remote history of CVA, dementia partially due to CVA  Wheelchair bound, poor functional status, living in nursing home, ECOG 3  Assessment / Plan:       1  Urothelial carcinoma ([facility] [address] )  - muscle invasive, appears to be not a good candidate for cystectomy  Patient was previously evaluated by radiation oncologist in 2021  Palliative radiation was discussed with patient and family, at that time the thoughts for radiation was to control bleeding however since patient has been of Eliquis, the hematuria has been stopped     As for now patient and family are not interested in proceeding with aggressive treatment including chemo radiation or even immunotherapy  We decided to hold of treatment and monitor patient by doing CT scan every 3 months for now, unless  patient and family change their mind and decided to proceed with hospice   - CT chest abdomen pelvis wo contrast; Future    2  Anemia, unspecified type    - secondary to hematuria  Bleeding has stopped  Will continue monitor clinically    3  Deep vein thrombosis (DVT) of proximal lower extremity, unspecified chronicity, unspecified laterality (HCC)    - due to frequent hematuria episodes and patient has risk factor for thrombosis, decided proceed with IVC filter placement       - Ambulatory referral to Interventional Radiology; Future    4  Lung nodule     - CT chest abdomen pelvis wo contrast; Future    5  Goals of care, counseling/discussion    - as above    6  Hematuria, unspecified type    - will continue monitor  7  Congenital absence of one kidney        Made patient aware regarding side effects of chemotherapy, including but not limited to fatigue cytopenia, increased risk for infection, potential kidney, liver injuries neuropathies et al    Made patient aware to call MD or go to ED for any fever,  Chills, bleeding, easy bruise, unhealed wound, chest pain, abdominal pain et al                       minutes were spent face to face with patient with greater than 50% of the time spent in counseling or coordination of care including discussions of treatment instructions  All of the patient's questions were answered to their satisfactory during this discussion  Advised pt to call if there is any further questions  HPI:     08/23/2021:  71-year-old female, has complicated medical history of summarized above, including newly diagnosed bladder cancer, hematuria, DVT, hematuria secondary to anticoagulation treatment, congenital kidney absence, remote history of CVA, dementia, referred to Medical Oncology for comanagement     Patient has severe dementia, not able to communicate  History is obtained from patient's family and the chart  As for now patient appears comfortable no complain from patient no complain from family  Bleeding has stopped  Patient wheelchair-bound;  living at nursing home     Used to smoke, stopped more than 10 years ago        Problem list:     Patient Active Problem List   Diagnosis    Elevated troponin    COPD (chronic obstructive pulmonary disease) (HCC)    Chronic diastolic heart failure (HCC)    Paroxysmal atrial flutter (HCC)    Back pain    Dementia (HCC)    Hyperlipidemia    Urinary tract infection associated with catheterization of urinary tract (HCC)    RSV infection    Ambulatory dysfunction    Hypernatremia    Stage 3 chronic kidney disease    Lower extremity edema    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    JESENIA (obstructive sleep apnea)    Intermittent claudication of right lower extremity due to atherosclerosis (HCC)    Atrial fibrillation (HCC)    Chronic indwelling Davis catheter    Essential hypertension    Macrocytic anemia    [facility]ac occlusive disease ([facility] )    HCAP (healthcare-associated pneumonia)    Coronary artery disease involving native coronary artery of native heart without angina pectoris    Hypothyroidism    PAD (peripheral artery disease) (HCC)    History of cerebral aneurysm repair    Urinary retention    [facility]    Hypertensive kidney disease with stage 3 chronic kidney disease (HCC)    Azotemia    Paroxysmal atrial fibrillation (HCC)    Vomiting    Pneumonia due to COVID-19 virus    Hypoxia    COVID-19    Gram-negative bacteremia    Sepsis (HCC)    Gross hematuria    Acute blood loss anemia    Urothelial carcinoma (HCC)    Malignant neoplasm of lateral wall of urinary bladder (HCC)    Obesity, morbid (HCC)    Vitamin D deficiency    Urinary tract infection    Chronic systolic heart failure (HCC)    Acute DVT (deep venous thrombosis) (HCC)    Deep vein thrombosis (DVT) of proximal lower extremity (HCC)    Lung nodule    Hematuria         PHYSICIAL EXAMINATION:     Vital Signs:   /88 (BP Location: Left arm, Patient Position: Sitting, Cuff Size: Standard)   Pulse 77   Temp 98 7 °F (37 1 °C) (Temporal)   Resp 16   Ht 5' 3" (1 6 m)   Wt 75 3 kg (166 lb)   SpO2 98%   BMI 29 41 kg/m²   Body surface area is 1 79 meters squared  Ht Readings from Last 3 Encounters:   08/23/21 5' 3" (1 6 m)   08/07/21 5' 3" (1 6 m)   07/23/21 5' 3" (1 6 m)       Wt Readings from Last 3 Encounters:   08/23/21 75 3 kg (166 lb)   08/08/21 74 2 kg (163 lb 9 3 oz)   07/27/21 72 kg (158 lb 11 7 oz)        Temp Readings from Last 3 Encounters:   08/23/21 98 7 °F (37 1 °C) (Temporal)   08/09/21 98 1 °F (36 7 °C)   07/27/21 98 2 °F (36 8 °C) (Oral)        BP Readings from Last 3 Encounters:   08/23/21 138/88   08/09/21 111/51   07/27/21 135/63         Pulse Readings from Last 3 Encounters:   08/23/21 77   08/09/21 59   07/27/21 61         Appears pale, appears comfortable, not able to communicate due to dementia  GEN: Alert, awake oriented x3, in no acute distress  HEENT- No pallor, icterus, cyanosis, no oral mucosal lesions,   LAD - no palpable cervical, clavicle, axillary, inguinal LAD  Heart- normal S1 S2, regular rate and rhythm, No murmur, rubs  Lungs- decreased breathing sound bilateral    Abdomen- soft, Non tender, bowel sounds present  Extremities- No cyanosis, clubbing, edema  Neuro- No focal neurological deficit           PAST MEDICAL HISTORY:   has a past medical history of Arthritis, Atrial fibrillation ([facility] [address] ), Brain aneurysm, CAD (coronary artery disease), Cardiac disease, Chronic kidney disease, CKD (chronic kidney disease) stage 3, GFR 30-59 ml/min ([facility]) (ckd), COPD (chronic obstructive pulmonary disease) ([facility] [address] ), Coronary artery disease, Dementia ([facility] [address] ), Diastolic CHF, chronic ([facility] [address] ), Disease of thyroid gland, GERD (gastroesophageal reflux disease), Hyperlipidemia, Hyperlipidemia, Hypertension, Hypothyroidism, Migraine, PAD (peripheral artery disease) ([facility] [address] ), Psychiatric disorder, Renal disorder, Seizures ([facility] [address] ), Stroke ([facility] [address] ), and TIA (transient ischemic attack)      PAST SURGICAL HISTORY:   has a past surgical history that includes Coronary stent placement; Appendectomy; Bladder tumor excision; Uterine fibroid surgery; Tubal ligation; Cardiac surgery; Eye surgery; Mandible fracture surgery; Tonsillectomy; Brain surgery; Colonoscopy; IR aortagram with run-off (12/7/2018); ARTERIOGRAM (N/A, 12/7/2018); pr cystourethroscopy w/irrig & evac clots (N/A, 6/19/2021); and Transurethral resection of bladder tumor (N/A, 6/23/2021)      CURRENT MEDICATIONS:   Current Outpatient Medications   Medication Sig Dispense Refill    acetaminophen (TYLENOL) 325 mg tablet Take 650 mg by mouth every 6 (six) hours as needed for mild pain      albuterol (PROVENTIL HFA,VENTOLIN HFA) 90 mcg/act inhaler Inhale 2 puffs every 6 (six) hours as needed for wheezing 1 Inhaler 0    bisacodyl (FLEET) 10 MG/30ML ENEM Insert 10 mg into the rectum as needed for constipation      buPROPion (WELLBUTRIN SR) 150 mg 12 hr tablet Take 1 tablet (150 mg total) by mouth daily 30 tablet 0    carBAMazepine (TEGretol) 100 mg chewable tablet Chew 1 tablet (100 mg total) 3 (three) times a day 90 tablet 0    Cholecalciferol (VITAMIN D) 2000 units CAPS Take 1 capsule (2,000 Units total) by mouth daily 14 capsule 0    digoxin (LANOXIN) 0 125 mg tablet Take 125 mcg by mouth daily      divalproex sodium (DEPAKOTE) 250 mg EC tablet Take 250 mg by mouth every 12 (twelve) hours       ferrous sulfate 325 (65 Fe) mg tablet Take 1 tablet (325 mg total) by mouth 2 (two) times a day 30 tablet 0    furosemide (LASIX) 20 mg tablet Take 1 tablet (20 mg total) by mouth daily  0    Glucagon, rDNA, (Glucagon Emergency) 1 MG KIT Inject as directed      levothyroxine 100 mcg tablet Take 1 tablet (100 mcg total) by mouth daily  0    LORazepam (ATIVAN) 0 5 mg tablet Take 1 tablet (0 5 mg total) by mouth daily at bedtime 3 tablet 0    magnesium hydroxide (MILK OF MAGNESIA) 400 mg/5 mL oral suspension Take by mouth daily as needed for constipation      melatonin 3 mg Take 6 mg by mouth daily at bedtime      metoprolol tartrate (LOPRESSOR) 25 mg tablet Take 1 tablet (25 mg total) by mouth every 12 (twelve) hours      nitroglycerin (NITROSTAT) 0 4 mg SL tablet Place 1 tablet (0 4 mg total) under the tongue every 5 (five) minutes as needed for chest pain 90 tablet 0    polyethylene glycol (MIRALAX) 17 g packet Take 17 g by mouth daily  0    senna (SENOKOT) 8 6 MG tablet Take 1 tablet by mouth daily       No current facility-administered medications for this visit  [unfilled]    SOCIAL HISTORY:   reports that she quit smoking about 13 years ago  She has a 200 00 pack-year smoking history  She has never used smokeless tobacco  She reports previous alcohol use  She reports that she does not use drugs  FAMILY HISTORY:  family history includes Diabetes in her brother; Glaucoma in her mother; Heart disease in her brother and father; Macular degeneration in her mother; Parkinsonism in her father  ALLERGIES:  is allergic to dye [iodinated diagnostic agents], spiriva handihaler [tiotropium bromide monohydrate], ramipril, and penicillins  REVIEW OF SYSTEMS:  Please note that a 14-point review of systems was performed to include Constitutional, HEENT, Respiratory, CVS, GI, , Musculoskeletal, Integumentary, Neurologic, Rheumatologic, Endocrinologic, Psychiatric, Lymphatic, and Hematologic/Oncologic systems were reviewed and are negative unless otherwise stated in HPI  Positive and negative findings pertinent to this evaluation are incorporated into the history of present illness              LAB:  Lab Results   Component Value Date    WBC 5 50 08/09/2021    HGB 9 1 (L) 08/09/2021    HCT 29 4 (L) 08/09/2021     (H) 08/09/2021     (L) 08/09/2021     Lab Results   Component Value Date    SODIUM 147 (H) 08/09/2021    K 4 5 08/09/2021     (H) 08/09/2021    CO2 30 08/09/2021    AGAP 6 08/09/2021    BUN 16 08/09/2021    CREATININE 1 29 08/09/2021    GLUC 98 08/09/2021    GLUF 95 10/28/2019    CALCIUM 8 5 08/09/2021    AST 13 07/23/2021    ALT 14 07/23/2021    ALKPHOS 60 07/23/2021    TP 6 5 07/23/2021    TBILI 0 35 07/23/2021    EGFR 41 08/09/2021       CBC with diff:       Invalid input(s):  RBC, TOTALCELLSCOUNTED, SEGS%, GRANS%, LYMPHS%, EOS%, BASO%, ABNEUT, ABGRANS, ABLYMPHS, ABMOMOS, ABEOS, ABBASO    CMP:      Invalid input(s): ALBUMIN    IMAGING:  CT chest abdomen pelvis wo contrast    (Results Pending)     No results found

## 2021-08-23 NOTE — PROGRESS NOTES
Outpatient Consultation - Palliative and Supportive Care   Yue Painting 77 y o  female [id number]    Assessment & Plan  Problem List Items Addressed This Visit        Respiratory    COPD (chronic obstructive pulmonary disease) (HCC) (Chronic)       Cardiovascular and Mediastinum    Paroxysmal atrial fibrillation (HCC)    Chronic systolic heart failure (HCC)       Nervous and Auditory    Dementia (HCC)       Genitourinary    Stage 3 chronic kidney disease (Chronic)    Malignant neoplasm of lateral wall of urinary bladder (HCC) - Primary        #symptom management   - no actively reported symptoms   - note solitary kidney, strong caution on renally dosed medications   - will continue to monitor    #goals of care   - AD + POLST completed, scanned on file   - quality >>> quantity   - introduced Palliative and Supportive Care   - daughter reports care plan is primarily comfort focused   - strong consideration for IVC filter placement after discussion with oncology today   - awaiting follow up appointment for further risk/benefits discussion prior to decision   - follow up CT imaging tentatively scheduled for 10/2021 to reassess disease progression   - at this point, low likelihood to proceed with cancer-directed therapy, as perceived burden would be greater than benefit, guiding principle to "not just place a Band-Aid to prolong life with a poor quality "   - overall wishes to continue current medical course of observation with no change until above mentioned CT imaging; if any decline, will further discuss at that time if any change to care plan   - introduced hospice model of care; patient previously enrolled in hospice at [place] [[facility]] x 8 months under [facility] [presumed diagnosis of late effects of Alzheimer's, daughter was not sure of specific diagnosis]    #psychosocial support   - emotional support provided   - , resides at [facility] [SNF]   - four adult children   - Deidre [daughter, mPOA, accompanied today]   - Uche Glynn, resides in [address] [daughter, resides in [address] [son, resides in [address]   - brother Dani Dolan also involved in care      Next [address] [facility] Follow up in 8 weeks  Cont[place] Sub[place] Review    [place] or [place]  reviewed: No red flags were identified; safe to proceed with prescription  Kalyan Prasad PDMP Review       Value Time User    PDMP Reviewed  Yes 8/23/2021  1:26 PM Serenity Harvey MD        Prescriptions  Total Prescriptions: 31    Total Private Pay: 6    Fill Date ID   Written Drug Qty Days Prescriber Rx # Pharmacy Refill   Daily Dose* Pymt Type      08/10/2021  1   08/10/2021  Lorazepam 0 5 MG Tablet  30 00  30 [facility]   [id number]   Omn (4781)   0   [place]   08/04/2021  1   08/04/2021  Lorazepam 0 5 MG Tablet  30 00  30 [facility]   [id number]   Omn (4781)   0   [place]   06/17/2021  1   06/16/2021  Lorazepam 0 5 MG Tablet  30 00  30 [facility]   [id number]   Omn (4781)   0   [place]   05/22/2021  1   05/22/2021  Lorazepam 0 5 MG Tablet  29 00  29 [facility]   [id number]   Omn (4781)   1   [place]   05/08/2021  1   05/08/2021  Lorazepam 2 Mg/Ml Oral Concent  30 00  30 [facility]   [id number]   Omn (4781)   0   [place] Pay   [place]       Medications adjusted this encounter:  Requested Prescriptions      No prescriptions requested or ordered in this encounter     No orders of the defined types were placed in this encounter  There are no discontinued medications  PPS: 40-50%  FAST: 6D      Shara Justice was seen today for symptoms and planning cares related to above illnesses  Above orders were sent electronically, or provided in hardcopy in clinic  I have reviewed the patient's controlled substance dispensing history in the Prescription Drug Monitoring Program in compliance with the [facility] regulations before prescribing any controlled substances  We appreciate the referral, and wish for her to continue to follow with us    If there are questions or concerns, please contact us through our clinic/answering service 24 hours a day, seven days a week  Edgar Marsh MD  [facility] Palliative and Supportive Care          Visit Information    Accompanied By: Daughter    Source of History: Medical record, Daughter    History Limitations: Dementia      History of Present Illness    Dee Castelan is a 77 y o  female who presents as a referral from Dr Nicci Morton of Urology for primary palliative diagnosis of bladder CA  Consultation is requested for: goal of care assessment and decisional support, advance care planning  Patient unable to provide history given dementia and decreased word fluency  History provided by [facility] and review of the medical records  Patient's daughter reports that overall focus of care plan is to preserve QOL with every medical decision  Per daughter, symptoms of bladder CA initially presented with gross hematuria in 06/2021, initially presumed UTI given chronic indwelling baca for urinary retention x 10 years  At that time, baca exchanged with continued gross hematuria, CBI performed in ED during visit and evaluated by urology with cystoscopy showing a bladder mass s/p cauterization and pathology sent  Path c/w invasive high-grade urothelial carcinoma  s/p bladder tumor resection 06/23/2021  At that time, on Eliquis for stroke risk reduction for pAF; recently discontinued in the setting of persistent gross hematuria [no AC x 2 weeks] with improvement since discontinuation  Previously resided in assisted living facility [[facility]] and was on hospice RLOC x 8 months for presumed late effects Alzheimer's disease; per daughter, at that time, patient was bedbound with minimal PO intake  Daughter attributed decline to depression during peak pandemic, as no visitors were permitted   Patient was discharged from hospice [[facility]] and moved to [facility] [[facility]]; now participates in PT/OT and is able to stand with assist     Patient reports no symptoms, but per daughter, complains minimally  20 lb unintentional weight loss x 1 month  Variable appetite, eats with assist, daughter feels that she eats better when she takes the time to remind patient to eat  No reported vomiting  Last BM unknown but no reports of constipation from facility, stools are often soft, unformed  No reported concern for bowel incontinence  Appointment with Dr Silvio Burgos today [oncology] with plan to continue observation  Follow up CT imaging tentatively scheduled for 10/2021  Given patient off anticoagulation, discussed potential placement of IVC filter for stroke-risk mitigation  No current plan for cancer-directed therapy discussed per patient's daughter  Oncology History   Malignant neoplasm of lateral wall of urinary bladder ([facility] [address] )   6/19/2021 Biopsy     Bladder, biopsy:    -  Portion of high-grade urothelial carcinoma      6/23/2021 Surgery    A  Urinary Bladder, Bladder tumor anterolateral wall:  - Invasive high-grade urothelial carcinoma  - Tumor invades lamina propria and muscularis propria  B  Urinary Bladder, Bladder tumor deep resection:  - Invasive high-grade urothelial carcinoma  - Tumor invades lamina propria and muscularis propria        7/6/2021 Initial Diagnosis    Malignant neoplasm of lateral wall of urinary bladder (HCC)         Pertinent Palliative Care Domains    Physical Symptoms: Able to stand with assist    Psychological Symptoms: limited insight    Social Aspects: has 4 adult children      Code Status: DNAR/DNI  Advance Directive and Living Will: Yes, on file  Power of :  Jamilah Brock [daughter]  POLST:  Yes    Historical Data  Past Medical History:   Diagnosis Date    Arthritis     Atrial fibrillation ([facility] [address] )     Brain aneurysm     CAD (coronary artery disease)     Cardiac disease     had stents 12 years ago in [facility]    Chronic kidney disease     CKD (chronic kidney disease) stage 3, GFR 30-59 ml/min (HCC) ckd    COPD (chronic obstructive pulmonary disease) (HCC)     Coronary artery disease     Dementia (HCC)     Diastolic CHF, chronic (HCC)     Disease of thyroid gland     GERD (gastroesophageal reflux disease)     Hyperlipidemia     Hyperlipidemia     Hypertension     Hypothyroidism     Migraine     PAD (peripheral artery disease) (HCC)     Psychiatric disorder     Renal disorder     Seizures ([facility] [address] )     Stroke ([facility] [address] )     TIA (transient ischemic attack)      Past Surgical History:   Procedure Laterality Date    APPENDECTOMY      ARTERIOGRAM N/A 2018    Procedure: ARTERIOGRAM WITH STENT PLACEMENT;  Surgeon: Denilson Reddy MD;  [facility];  Service: Vascular    BLADDER TUMOR EXCISION     [id number]  clip right frontal lobe    CARDIAC SURGERY      COLONOSCOPY      CORONARY STENT PLACEMENT      5 stents    EYE SURGERY      IR AORTAGRAM WITH RUN-OFF  2018    MANDIBLE FRACTURE SURGERY      [place] CYSTOURETHROSCOPY W/IRRIG & EVAC CLOTS N/A 2021    Procedure: CYSTOSCOPY EVACUATION OF CLOTS;  Surgeon: Chino Choi MD;  [facility];  Service: Urology    TONSILLECTOMY      TRANSURETHRAL RESECTION OF BLADDER TUMOR N/A 2021    Procedure: TRANSURETHRAL RESECTION OF BLADDER TUMOR (TURBT);   Surgeon: Chino Choi MD;  [facility];  Service: Urology    TUBAL LIGATION      UTERINE FIBROID SURGERY       Social History     Socioeconomic History    Marital status:      Spouse name: Not on file    Number of children: 3    Years of education: Not on file    Highest education level: Not on file   Occupational History    Occupation: retired   Tobacco Use    Smoking status: Former Smoker     Packs/day: 5 00     Years: 40 00     Pack years: 200 00     Quit date: 2007     Years since quittin 9    Smokeless tobacco: Never Used   Vaping Use    Vaping Use: Never used   Substance and Sexual Activity    Alcohol use: Not Currently     Alcohol/week: 0 0 standard drinks    Drug use: No    Sexual activity: Not Currently   Other Topics Concern    Not on file   Social History Narrative    Not on file     Social Determinants of Health     Financial Resource Strain:     Difficulty of Paying Living Expenses:    Food Insecurity:     Worried About Running Out of Food in the Last Year:     Ran Out of Food in the Last Year:    Transportation Needs:     Lack of Transportation (Medical):      Lack of Transportation (Non-Medical):    Physical Activity:     Days of Exercise per Week:     Minutes of Exercise per Session:    Stress:     Feeling of Stress :    Social Connections:     Frequency of Communication with Friends and Family:     Frequency of Social Gatherings with Friends and Family:     Attends Sabianist Services:     Active Member of Clubs or Organizations:     Attends Club or Organization Meetings:     Marital Status:    Intimate Partner Violence:     Fear of Current or Ex-Partner:     Emotionally Abused:     Physically Abused:     Sexually Abused:      Family History   Problem Relation Age of Onset    Heart disease Father     Parkinsonism Father     Macular degeneration Mother     Glaucoma Mother     Diabetes Brother     Heart disease Brother      Allergies   Allergen Reactions    Dye [Iodinated Diagnostic Agents] Other (See Comments)     allergy    Spiriva Handihaler [Tiotropium Bromide Monohydrate] Swelling    Ramipril Facial Swelling    Penicillins      unsure     Current Outpatient Medications   Medication Sig Dispense Refill    acetaminophen (TYLENOL) 325 mg tablet Take 650 mg by mouth every 6 (six) hours as needed for mild pain      albuterol (PROVENTIL HFA,VENTOLIN HFA) 90 mcg/act inhaler Inhale 2 puffs every 6 (six) hours as needed for wheezing 1 Inhaler 0    bisacodyl (FLEET) 10 MG/30ML ENEM Insert 10 mg into the rectum as needed for constipation      buPROPion (WELLBUTRIN SR) 150 mg 12 hr tablet Take 1 tablet (150 mg total) by mouth daily 30 tablet 0    carBAMazepine (TEGretol) 100 mg chewable tablet Chew 1 tablet (100 mg total) 3 (three) times a day 90 tablet 0    Cholecalciferol (VITAMIN D) 2000 units CAPS Take 1 capsule (2,000 Units total) by mouth daily 14 capsule 0    digoxin (LANOXIN) 0 125 mg tablet Take 125 mcg by mouth daily      divalproex sodium (DEPAKOTE) 250 mg EC tablet Take 250 mg by mouth every 12 (twelve) hours       ferrous sulfate 325 (65 Fe) mg tablet Take 1 tablet (325 mg total) by mouth 2 (two) times a day 30 tablet 0    furosemide (LASIX) 20 mg tablet Take 1 tablet (20 mg total) by mouth daily  0    levothyroxine 100 mcg tablet Take 1 tablet (100 mcg total) by mouth daily  0    LORazepam (ATIVAN) 0 5 mg tablet Take 1 tablet (0 5 mg total) by mouth daily at bedtime 3 tablet 0    magnesium hydroxide (MILK OF MAGNESIA) 400 mg/5 mL oral suspension Take by mouth daily as needed for constipation      melatonin 3 mg Take 6 mg by mouth daily at bedtime      metoprolol tartrate (LOPRESSOR) 25 mg tablet Take 1 tablet (25 mg total) by mouth every 12 (twelve) hours      nitroglycerin (NITROSTAT) 0 4 mg SL tablet Place 1 tablet (0 4 mg total) under the tongue every 5 (five) minutes as needed for chest pain 90 tablet 0    polyethylene glycol (MIRALAX) 17 g packet Take 17 g by mouth daily  0    senna (SENOKOT) 8 6 MG tablet Take 1 tablet by mouth daily      Glucagon, rDNA, (Glucagon Emergency) 1 MG KIT Inject as directed       No current facility-administered medications for this visit  Review of Systems   Unable to perform ROS: dementia   Respiratory: Negative            Vital Signs     08/23/21 11:27 AM   Blood Pressure 138/88   Pulse 77   Respirations 16   Temperature 98 7 °F (37 1 °C)   Temp Source Temporal   SpO2 98 %   Weight - Scale 75 3 kg (166 lb)   Height 5' 3" (1 6 m)   Pain Score Zero       Physical Exam and Objective Data  Physical Exam  Vitals and nursing note reviewed  Constitutional:       General: She is awake  Appearance: She is not diaphoretic  Comments: Sitting up in wheelchair  Pleasant, elderly appearing in NAD  Non-toxic appearing   HENT:      Head: Normocephalic and atraumatic  Right Ear: External ear normal       Left Ear: External ear normal       Nose: No rhinorrhea  Eyes:      Comments: No gaze preference   Cardiovascular:      Rate and Rhythm: Normal rate  Pulmonary:      Effort: No tachypnea, accessory muscle usage or respiratory distress  Abdominal:      Palpations: Abdomen is not rigid  Musculoskeletal:      Cervical back: Normal range of motion  Neurological:      General: No focal deficit present  Mental Status: She is alert  Mental status is at baseline  Psychiatric:         Speech: Speech is delayed  Cognition and Memory: Cognition is impaired  Memory is impaired  Radiology and Laboratory:  I personally reviewed and interpreted the following results:    Most Recent COVID-19 Results:  Lab Results   Component Value Date/Time    SARSCOV2 Positive (A) 07/19/2021 06:56 AM    SARSCOV2 Negative 06/24/2021 09:53 AM       Most Recent Lab Work:  Lab Results   Component Value Date/Time    SODIUM 147 (H) 08/09/2021 06:55 AM    K 4 5 08/09/2021 06:55 AM    BUN 16 08/09/2021 06:55 AM    CREATININE 1 29 08/09/2021 06:55 AM    GLUC 98 08/09/2021 06:55 AM     Lab Results   Component Value Date/Time    AST 13 07/23/2021 05:08 AM    ALT 14 07/23/2021 05:08 AM    ALB 2 8 (L) 07/23/2021 05:08 AM     Lab Results   Component Value Date/Time    HGB 9 1 (L) 08/09/2021 06:55 AM    WBC 5 50 08/09/2021 06:55 AM     (L) 08/09/2021 06:55 AM    INR 1 11 08/07/2021 04:51 AM    PTT 29 08/07/2021 04:51 AM       Most Recent Imaging [last 30 days]:  No results found      65 minutes was spent face to face with Mustapha Almazan and her daughter with greater than 50% of the time spent in counseling or coordination of care including discussions of risks and benefits of treatment, patient and family counseling/involvement in care  Introduction to Palliative medicine  Introduction to hospice model of care  Emotional support  Additional time was also spent in discussing coronavirus vaccine indications, availability, and logistical challenges  All of the patient's questions were answered during this discussion

## 2021-08-26 ENCOUNTER — TELEMEDICINE (OUTPATIENT)
Dept: INTERVENTIONAL RADIOLOGY/VASCULAR | Facility: CLINIC | Age: 76
End: 2021-08-26
Payer: MEDICARE

## 2021-08-26 DIAGNOSIS — I82.4Y1 DEEP VEIN THROMBOSIS (DVT) OF PROXIMAL VEIN OF RIGHT LOWER EXTREMITY, UNSPECIFIED CHRONICITY (HCC): Primary | ICD-10-CM

## 2021-08-26 PROCEDURE — 99443 PR PHYS/QHP TELEPHONE EVALUATION 21-30 MIN: CPT | Performed by: RADIOLOGY

## 2021-08-26 NOTE — PROGRESS NOTES
Virtual Brief Visit    Verification of patient location:    Patient is located in the following state in which I hold an active license [place]      Assessment/Plan:    Problem List Items Addressed This Visit        Cardiovascular and Mediastinum    Deep vein thrombosis (DVT) of proximal lower extremity ([address] ) - Primary     77 y F w/ muscle invasive bladder cancer c/b hematuria requiring discontinuation of Eliquis  Unfortunately, she was recently diagnosed with proximal RLE DVT  I described the procedure details, risk and benefits with the patient's daughter  I answered numerous questions  I explained that we often will try to remove the IVC filter if possible; however, given her mother's condition it may be permanent  Following extensive discussion, the daughter would like to proceed  Of note, I explained that we can provide moderate sedation but often can be done just with local anesthesia  Her mother is in a nursing home near the [facility]  I will coordinate scheduling for this campus and arrange for moderate sedation with the option of just performing the procedure with local anesthesia  Relevant Orders    IR IVC filter placement optional/temporary                Reason for visit is   Chief Complaint   Patient presents with    Virtual Brief Visit        Encounter provider Dickson Hawthorne DO    Provider located at [address] [address] [phone]    Recent Visits  Date Type Provider Dept   08/26/21 [address] DO Tino CORTES recent visits within past 7 days and meeting all other requirements  Future Appointments  No visits were found meeting these conditions  Showing future appointments within next 150 days and meeting all other requirements       After connecting through telephone and patient was informed that this is not a secure, HIPAA-compliant platform  She agrees to proceed  , the patient was identified by name and date of birth  My office door was closed  No one else was in the room  She acknowledged consent and understanding of privacy and security of the platform  The patient has agreed to participate and understands she can discontinue the visit at any time  Patient is aware this is a billable service  CC: DVT    HPI:  77 y F w/ h/o CVA, CAD, dementia, anemia, RLE DVT, SPT, recently diagnosed muscle invasive bladder c/b hematuria and discontinuation of Eliquis  Patient referred to IR clinic to discuss IVC filter placement  The clinic visit was conducted through the telephone with the patient's daughter Alonso Stinson [facility])  Her mother is mostly wheelchair bound due to ambulatory issues  It is unknown if the patient will be able to resume anticoagulation in the future  Prognosis is unknown in terms of life span, possibly 6 months to a year  [facility]         Past Medical History:   Diagnosis Date    Arthritis     Atrial fibrillation ([facility] )     Brain aneurysm     CAD (coronary artery disease)     Cardiac disease     had stents 12 years ago in [facility]    Chronic kidney disease     CKD (chronic kidney disease) stage 3, GFR 30-59 ml/min ([facility]) ckd    COPD (chronic obstructive pulmonary disease) ([facility])     Coronary artery disease     Dementia ([facility])     Diastolic CHF, chronic ([facility])     Disease of thyroid gland     GERD (gastroesophageal reflux disease)     Hyperlipidemia     Hyperlipidemia     Hypertension     Hypothyroidism     Migraine     PAD (peripheral artery disease) ([facility])     Psychiatric disorder     Renal disorder     [facility]     Stroke ([facility] )     TIA (transient ischemic attack)        Past Surgical History:   Procedure Laterality Date    APPENDECTOMY      ARTERIOGRAM N/A 12/7/2018    Procedure: ARTERIOGRAM WITH STENT PLACEMENT;  Surgeon: Estelle Reddy MD;  [facility]; Service: Vascular    BLADDER TUMOR EXCISION      BRAIN SURGERY      1998  clip right frontal lobe    CARDIAC SURGERY      COLONOSCOPY      CORONARY STENT PLACEMENT      5 stents    EYE SURGERY      IR AORTAGRAM WITH RUN-OFF  12/7/2018    MANDIBLE FRACTURE SURGERY      [place] CYSTOURETHROSCOPY W/IRRIG & EVAC CLOTS N/A 6/19/2021    Procedure: CYSTOSCOPY EVACUATION OF CLOTS;  Surgeon: Reyes Rios MD;  Location: [facility];  Service: Urology    TONSILLECTOMY      TRANSURETHRAL RESECTION OF BLADDER TUMOR N/A 6/23/2021    Procedure: TRANSURETHRAL RESECTION OF BLADDER TUMOR (TURBT);   Surgeon: Reyes Rios MD;  [facility]: [facility];  Service: Urology    TUBAL LIGATION      UTERINE FIBROID SURGERY         Current Outpatient Medications   Medication Sig Dispense Refill    acetaminophen (TYLENOL) 325 mg tablet Take 650 mg by mouth every 6 (six) hours as needed for mild pain      albuterol (PROVENTIL HFA,VENTOLIN HFA) 90 mcg/act inhaler Inhale 2 puffs every 6 (six) hours as needed for wheezing 1 Inhaler 0    bisacodyl (FLEET) 10 MG/30ML ENEM Insert 10 mg into the rectum as needed for constipation      buPROPion (WELLBUTRIN SR) 150 mg 12 hr tablet Take 1 tablet (150 mg total) by mouth daily 30 tablet 0    carBAMazepine (TEGretol) 100 mg chewable tablet Chew 1 tablet (100 mg total) 3 (three) times a day 90 tablet 0    Cholecalciferol (VITAMIN D) 2000 units CAPS Take 1 capsule (2,000 Units total) by mouth daily 14 capsule 0    digoxin (LANOXIN) 0 125 mg tablet Take 125 mcg by mouth daily      divalproex sodium (DEPAKOTE) 250 mg EC tablet Take 250 mg by mouth every 12 (twelve) hours       ferrous sulfate 325 (65 Fe) mg tablet Take 1 tablet (325 mg total) by mouth 2 (two) times a day 30 tablet 0    furosemide (LASIX) 20 mg tablet Take 1 tablet (20 mg total) by mouth daily  0    Glucagon, rDNA, (Glucagon Emergency) 1 MG KIT Inject as directed      levothyroxine 100 mcg tablet Take 1 tablet (100 mcg total) by mouth daily  0    LORazepam (ATIVAN) 0 5 mg tablet Take 1 tablet (0 5 mg total) by mouth daily at bedtime 3 tablet 0    magnesium hydroxide (MILK OF MAGNESIA) 400 mg/5 mL oral suspension Take by mouth daily as needed for constipation      melatonin 3 mg Take 6 mg by mouth daily at bedtime      metoprolol tartrate (LOPRESSOR) 25 mg tablet Take 1 tablet (25 mg total) by mouth every 12 (twelve) hours      nitroglycerin (NITROSTAT) 0 4 mg SL tablet Place 1 tablet (0 4 mg total) under the tongue every 5 (five) minutes as needed for chest pain 90 tablet 0    polyethylene glycol (MIRALAX) 17 g packet Take 17 g by mouth daily  0    senna (SENOKOT) 8 6 MG tablet Take 1 tablet by mouth daily       No current facility-administered medications for this visit  Allergies   Allergen Reactions    Dye [Iodinated Diagnostic Agents] Other (See Comments)     allergy    Spiriva Handihaler [Tiotropium Bromide Monohydrate] Swelling    Ramipril Facial Swelling    Penicillins      unsure       Review of Systems   Constitutional: Negative  HENT: Negative  Eyes: Negative  Respiratory: Negative  Cardiovascular: Positive for leg swelling  Gastrointestinal: Negative  Endocrine: Negative  Genitourinary: Negative  Musculoskeletal: Negative  Neurological: Negative  Hematological: Negative  Psychiatric/Behavioral: Negative  There were no vitals filed for this visit  I spent 45 minutes with patient today in which greater than 50% of the time was spent in counseling/coordination of care regarding management of her DVT  VIRTUAL VISIT DISCLAIMER      Demario Garcia verbally agrees to participate in [employer]   Pt is aware that [employer] could be limited without vital signs or the ability to perform a full hands-on physical exam  Debi Tobias understands she or the provider may request at any time to terminate the video visit and request the patient to seek care or treatment in person

## 2021-08-26 NOTE — ASSESSMENT & PLAN NOTE
77 y F w/ muscle invasive bladder cancer c/b hematuria requiring discontinuation of Eliquis  Unfortunately, she was recently diagnosed with proximal RLE DVT  I described the procedure details, risk and benefits with the patient's daughter  I answered numerous questions  I explained that we often will try to remove the IVC filter if possible; however, given her mother's condition it may be permanent  Following extensive discussion, the daughter would like to proceed  Of note, I explained that we can provide moderate sedation but often can be done just with local anesthesia  Her mother is in a nursing home near the [facility] [place]  I will coordinate scheduling for this campus and arrange for moderate sedation with the option of just performing the procedure with local anesthesia

## 2021-09-02 ENCOUNTER — HOSPITAL ENCOUNTER (OUTPATIENT)
Dept: INTERVENTIONAL RADIOLOGY/VASCULAR | Facility: HOSPITAL | Age: 76
Discharge: HOME | End: 2021-09-02
Attending: RADIOLOGY
Payer: MEDICARE

## 2021-09-02 VITALS
BODY MASS INDEX: 29.41 KG/M2 | DIASTOLIC BLOOD PRESSURE: 72 MMHG | HEART RATE: 57 BPM | HEIGHT: 63 IN | WEIGHT: 166.01 LBS | OXYGEN SATURATION: 95 % | RESPIRATION RATE: 22 BRPM | SYSTOLIC BLOOD PRESSURE: 150 MMHG | TEMPERATURE: 97.8 F

## 2021-09-02 DIAGNOSIS — I82.4Y1 DEEP VEIN THROMBOSIS (DVT) OF PROXIMAL VEIN OF RIGHT LOWER EXTREMITY, UNSPECIFIED CHRONICITY (HCC): ICD-10-CM

## 2021-09-02 PROCEDURE — C1894 INTRO/SHEATH, NON-LASER: HCPCS

## 2021-09-02 PROCEDURE — 99152 MOD SED SAME PHYS/QHP 5/>YRS: CPT

## 2021-09-02 PROCEDURE — C1769 GUIDE WIRE: HCPCS

## 2021-09-02 PROCEDURE — 37191 INS ENDOVAS VENA CAVA FILTR: CPT | Performed by: RADIOLOGY

## 2021-09-02 PROCEDURE — C1880 VENA CAVA FILTER: HCPCS

## 2021-09-02 PROCEDURE — 37191 INS ENDOVAS VENA CAVA FILTR: CPT

## 2021-09-02 PROCEDURE — 99152 MOD SED SAME PHYS/QHP 5/>YRS: CPT | Performed by: RADIOLOGY

## 2021-09-02 RX ORDER — FENTANYL CITRATE 50 UG/ML
INJECTION, SOLUTION INTRAMUSCULAR; INTRAVENOUS CODE/TRAUMA/SEDATION MEDICATION
Status: COMPLETED | OUTPATIENT
Start: 2021-09-02 | End: 2021-09-02

## 2021-09-02 RX ORDER — LIDOCAINE WITH 8.4% SOD BICARB 0.9%(10ML)
SYRINGE (ML) INJECTION CODE/TRAUMA/SEDATION MEDICATION
Status: COMPLETED | OUTPATIENT
Start: 2021-09-02 | End: 2021-09-02

## 2021-09-02 RX ORDER — MIDAZOLAM HYDROCHLORIDE 2 MG/2ML
INJECTION, SOLUTION INTRAMUSCULAR; INTRAVENOUS CODE/TRAUMA/SEDATION MEDICATION
Status: COMPLETED | OUTPATIENT
Start: 2021-09-02 | End: 2021-09-02

## 2021-09-02 RX ADMIN — FENTANYL CITRATE 50 MCG: 50 INJECTION, SOLUTION INTRAMUSCULAR; INTRAVENOUS at 13:26

## 2021-09-02 RX ADMIN — IOHEXOL 20 ML: 350 INJECTION, SOLUTION INTRAVENOUS at 14:01

## 2021-09-02 RX ADMIN — MIDAZOLAM HYDROCHLORIDE 1 MG: 1 INJECTION, SOLUTION INTRAMUSCULAR; INTRAVENOUS at 13:26

## 2021-09-02 RX ADMIN — Medication 3 ML: at 13:31

## 2021-09-02 NOTE — BRIEF OP NOTE (RAD/CATH)
INTERVENTIONAL RADIOLOGY PROCEDURE NOTE    Date: 9/2/2021    Procedure: IR IVC FILTER PLACEMENT OPTIONAL/TEMPORARY    Preoperative diagnosis:   1  Deep vein thrombosis (DVT) of proximal vein of right lower extremity, unspecified chronicity (HCC)         Postoperative diagnosis: Same  Surgeon: Jose Alfredo Alexander MD     Assistant: None  No qualified resident was available  Blood loss:  Minimal    Specimens:  None     Findings:  Placement of an optional IVC filter  Complications: None immediate      Anesthesia: conscious sedation

## 2021-09-16 ENCOUNTER — TELEPHONE (OUTPATIENT)
Dept: UROLOGY | Facility: AMBULATORY SURGERY CENTER | Age: 76
End: 2021-09-16

## 2021-09-16 ENCOUNTER — TELEPHONE (OUTPATIENT)
Dept: OTHER | Facility: OTHER | Age: 76
End: 2021-09-16

## 2021-09-16 NOTE — TELEPHONE ENCOUNTER
Called patient to address concerns , Called work # [phone]   They said no one was there by that name    Called daughter Elwyn Schlatter # [phone] left detailed message on machine instructed to call our office back with concerns

## 2021-09-16 NOTE — TELEPHONE ENCOUNTER
Patient managed by Dr Shanta Altman) patients daughter Nisa Roger  called in to schedule a catheter change  Nisa Roger states the nursing home is not changing the catheter properly which is causing patient to end up in the hospital for 7 days with bleeding of the bladder    Nisa Roger can be reached at [phone]

## 2021-09-16 NOTE — TELEPHONE ENCOUNTER
Patients daughter transferred to me from [place] center   Patient daughter Orlin Barahona states that her Cath has not been changed since 8/8   She stated that the place she lives does not change it   She also stated she has a 3 way cath and they have it wrapped in tape   Patients daughter would like her to be set up to come in to the office and get it changed regularly b c they are not caring for it   Patient would like to been in [place] office   Patient is in wheel chair and she does not walk very well  Patients daughter said it changed day by day   Patients daughter is willing to transfer her but I informed daughter she may need to come in stretcher   Patients daughter also stated she is open to having a VNA coming to the home   She stated that the home is not caring for it well and she does not trust them    Patient would like nurse visit for cath change in [place]

## 2021-09-20 NOTE — TELEPHONE ENCOUNTER
Called and left VM for pt's daughter to call back and confirm appointment and that pt will be comming on a stretcher   If this day does not work for them R/s on any open nurse day in office

## 2021-09-23 ENCOUNTER — TELEPHONE (OUTPATIENT)
Dept: HEMATOLOGY ONCOLOGY | Facility: CLINIC | Age: 76
End: 2021-09-23

## 2021-09-23 NOTE — TELEPHONE ENCOUNTER
Appointment Confirmation     Appointment with  Dr Schwartz   Appointment date & time 10-29-21 @ 2:00pm    Location Virtual VIsit   Patient verbilized Understanding

## 2021-09-29 ENCOUNTER — PROCEDURE VISIT (OUTPATIENT)
Dept: UROLOGY | Facility: CLINIC | Age: 76
End: 2021-09-29
Payer: MEDICARE

## 2021-09-29 VITALS
WEIGHT: 150 LBS | HEIGHT: 63 IN | HEART RATE: 80 BPM | BODY MASS INDEX: 26.58 KG/M2 | SYSTOLIC BLOOD PRESSURE: 160 MMHG | DIASTOLIC BLOOD PRESSURE: 80 MMHG | RESPIRATION RATE: 20 BRPM

## 2021-09-29 DIAGNOSIS — R33.9 URINARY RETENTION: Primary | ICD-10-CM

## 2021-09-29 PROCEDURE — 51703 INSERT BLADDER CATH COMPLEX: CPT

## 2021-09-29 NOTE — PROGRESS NOTES
9/29/2021  Yoni Hwnag is a 77 y o  female  [id number]    Diagnosis:  Chief Complaint     Urinary Retention; baca change          Patient presents for routine baca change managed by Dr Carlos Enrique Lujan:  Patient will return in 4 weeks for baca change  Patient instructed to call with any questions or concerns in the meantime  No orders of the defined types were placed in this encounter  Vitals:    09/29/21 1311   BP: 160/80   Pulse: 80   Resp: 20   Weight: 68 kg (150 lb)   Height: 5' 3" (1 6 m)           Procedure:    Universal Protocol:  Consent: Verbal consent obtained  Consent given by: guardian      Bladder catheterization    Date/Time: 9/29/2021 2:06 PM  Performed by: Rohan Hernandez RN  Authorized by: Hai Malagon MD     Consent:     Consent given by:  Kartik Curiel details:     Procedure purpose:  Therapeutic  Anesthesia (see MAR for exact dosages): Anesthesia method:  None  Procedure details:     Catheter insertion:  Indwelling    Approach: natural orifice      Catheter type:  Latex and Baca    Catheter size:  18 Fr    Number of attempts:  1    Successful placement: yes      Urine characteristics:  Blood-tinged    Provider performed due to:  Complicated insertion  Post-procedure details:     Patient tolerance of procedure: Tolerated with difficulty    Patient's daughter requesting patient's baca to be changed at the office instead of patient's facility  Baca change is complicated due to patient's dementia  Patient is a 2 assist   Daughter asked if the facility could obtain an urine culture  Orders written for baca changes at office and urine culture        Rohan Hernandez RN

## 2021-10-02 ENCOUNTER — HOSPITAL ENCOUNTER (OUTPATIENT)
Dept: CT IMAGING | Facility: HOSPITAL | Age: 76
Discharge: HOME | End: 2021-10-02
Attending: INTERNAL MEDICINE
Payer: MEDICARE

## 2021-10-02 DIAGNOSIS — C68.9 UROTHELIAL CARCINOMA (HCC): ICD-10-CM

## 2021-10-02 DIAGNOSIS — R91.1 LUNG NODULE: ICD-10-CM

## 2021-10-02 PROCEDURE — 71250 CT THORAX DX C-: CPT

## 2021-10-02 PROCEDURE — 74176 CT ABD & PELVIS W/O CONTRAST: CPT

## 2021-10-19 ENCOUNTER — TELEPHONE (OUTPATIENT)
Dept: PALLIATIVE MEDICINE | Facility: CLINIC | Age: 76
End: 2021-10-19

## 2021-10-28 ENCOUNTER — PROCEDURE VISIT (OUTPATIENT)
Dept: UROLOGY | Facility: CLINIC | Age: 76
End: 2021-10-28
Payer: MEDICARE

## 2021-10-28 VITALS — DIASTOLIC BLOOD PRESSURE: 72 MMHG | HEART RATE: 58 BPM | SYSTOLIC BLOOD PRESSURE: 122 MMHG

## 2021-10-28 DIAGNOSIS — R33.9 URINARY RETENTION: Primary | ICD-10-CM

## 2021-10-28 DIAGNOSIS — C67.2 MALIGNANT NEOPLASM OF LATERAL WALL OF URINARY BLADDER (HCC): ICD-10-CM

## 2021-10-28 DIAGNOSIS — F02.81 DEMENTIA ASSOCIATED WITH OTHER UNDERLYING DISEASE WITH BEHAVIORAL DISTURBANCE (HCC): ICD-10-CM

## 2021-10-28 PROCEDURE — 51702 INSERT TEMP BLADDER CATH: CPT

## 2021-10-29 ENCOUNTER — TELEMEDICINE (OUTPATIENT)
Dept: HEMATOLOGY ONCOLOGY | Facility: CLINIC | Age: 76
End: 2021-10-29
Payer: MEDICARE

## 2021-10-29 DIAGNOSIS — C68.9 UROTHELIAL CARCINOMA (HCC): Primary | ICD-10-CM

## 2021-10-29 DIAGNOSIS — R91.1 LUNG NODULE: ICD-10-CM

## 2021-10-29 PROCEDURE — G2025 DIS SITE TELE SVCS RHC/FQHC: HCPCS | Performed by: INTERNAL MEDICINE

## 2021-11-18 ENCOUNTER — TELEPHONE (OUTPATIENT)
Dept: NEPHROLOGY | Facility: CLINIC | Age: 76
End: 2021-11-18

## 2021-11-19 ENCOUNTER — OFFICE VISIT (OUTPATIENT)
Dept: NEPHROLOGY | Facility: CLINIC | Age: 76
End: 2021-11-19
Payer: MEDICARE

## 2021-11-19 VITALS
HEIGHT: 60 IN | HEART RATE: 55 BPM | SYSTOLIC BLOOD PRESSURE: 110 MMHG | RESPIRATION RATE: 16 BRPM | TEMPERATURE: 96.3 F | BODY MASS INDEX: 33.38 KG/M2 | WEIGHT: 170 LBS | DIASTOLIC BLOOD PRESSURE: 70 MMHG

## 2021-11-19 DIAGNOSIS — N18.30 STAGE 3 CHRONIC KIDNEY DISEASE, UNSPECIFIED WHETHER STAGE 3A OR 3B CKD (HCC): Primary | ICD-10-CM

## 2021-11-19 DIAGNOSIS — N18.30 HYPERTENSIVE KIDNEY DISEASE WITH STAGE 3 CHRONIC KIDNEY DISEASE, UNSPECIFIED WHETHER STAGE 3A OR 3B CKD (HCC): ICD-10-CM

## 2021-11-19 DIAGNOSIS — I12.9 HYPERTENSIVE KIDNEY DISEASE WITH STAGE 3 CHRONIC KIDNEY DISEASE, UNSPECIFIED WHETHER STAGE 3A OR 3B CKD (HCC): ICD-10-CM

## 2021-11-19 DIAGNOSIS — E55.9 VITAMIN D DEFICIENCY: ICD-10-CM

## 2021-11-19 PROCEDURE — 99214 OFFICE O/P EST MOD 30 MIN: CPT | Performed by: INTERNAL MEDICINE

## 2021-11-19 RX ORDER — MULTIVITAMIN
1 CAPSULE ORAL DAILY
COMMUNITY

## 2021-12-03 ENCOUNTER — PROCEDURE VISIT (OUTPATIENT)
Dept: UROLOGY | Facility: CLINIC | Age: 76
End: 2021-12-03
Payer: MEDICARE

## 2021-12-03 VITALS — DIASTOLIC BLOOD PRESSURE: 76 MMHG | HEART RATE: 57 BPM | SYSTOLIC BLOOD PRESSURE: 124 MMHG

## 2021-12-03 DIAGNOSIS — R33.9 URINARY RETENTION: Primary | ICD-10-CM

## 2021-12-03 DIAGNOSIS — R33.9 URINARY RETENTION: ICD-10-CM

## 2021-12-03 PROCEDURE — 51702 INSERT TEMP BLADDER CATH: CPT

## 2021-12-03 RX ORDER — SULFAMETHOXAZOLE AND TRIMETHOPRIM 800; 160 MG/1; MG/1
1 TABLET ORAL EVERY 12 HOURS SCHEDULED
Qty: 10 TABLET | Refills: 0 | Status: SHIPPED | OUTPATIENT
Start: 2021-12-03 | End: 2021-12-03 | Stop reason: SDUPTHER

## 2021-12-03 RX ORDER — SULFAMETHOXAZOLE AND TRIMETHOPRIM 800; 160 MG/1; MG/1
1 TABLET ORAL EVERY 12 HOURS SCHEDULED
Qty: 10 TABLET | Refills: 0 | Status: SHIPPED | OUTPATIENT
Start: 2021-12-03 | End: 2021-12-07 | Stop reason: SDUPTHER

## 2021-12-03 RX ORDER — LORAZEPAM 1 MG/1
TABLET ORAL
Qty: 3 TABLET | Refills: 0 | Status: SHIPPED | OUTPATIENT
Start: 2021-12-03 | End: 2021-12-07 | Stop reason: SDUPTHER

## 2021-12-03 RX ORDER — LORAZEPAM 1 MG/1
TABLET ORAL
Qty: 3 TABLET | Refills: 0 | Status: SHIPPED | OUTPATIENT
Start: 2021-12-03 | End: 2021-12-03 | Stop reason: SDUPTHER

## 2021-12-06 ENCOUNTER — TELEPHONE (OUTPATIENT)
Dept: UROLOGY | Facility: CLINIC | Age: 76
End: 2021-12-06

## 2021-12-07 DIAGNOSIS — R33.9 URINARY RETENTION: ICD-10-CM

## 2021-12-07 RX ORDER — LORAZEPAM 1 MG/1
TABLET ORAL
Qty: 3 TABLET | Refills: 0 | Status: SHIPPED | OUTPATIENT
Start: 2021-12-07

## 2021-12-07 RX ORDER — SULFAMETHOXAZOLE AND TRIMETHOPRIM 800; 160 MG/1; MG/1
1 TABLET ORAL EVERY 12 HOURS SCHEDULED
Qty: 10 TABLET | Refills: 0 | Status: SHIPPED | OUTPATIENT
Start: 2021-12-07 | End: 2021-12-12

## 2021-12-21 ENCOUNTER — PROCEDURE VISIT (OUTPATIENT)
Dept: UROLOGY | Facility: CLINIC | Age: 76
End: 2021-12-21
Payer: MEDICARE

## 2021-12-21 DIAGNOSIS — R31.9 HEMATURIA, UNSPECIFIED TYPE: ICD-10-CM

## 2021-12-21 PROCEDURE — 99214 OFFICE O/P EST MOD 30 MIN: CPT | Performed by: UROLOGY

## 2021-12-22 ENCOUNTER — TELEPHONE (OUTPATIENT)
Dept: RADIATION ONCOLOGY | Facility: CLINIC | Age: 76
End: 2021-12-22

## 2021-12-30 ENCOUNTER — RADIATION ONCOLOGY CONSULT (OUTPATIENT)
Dept: RADIATION ONCOLOGY | Facility: CLINIC | Age: 76
End: 2021-12-30
Attending: RADIOLOGY
Payer: MEDICARE

## 2021-12-30 ENCOUNTER — CLINICAL SUPPORT (OUTPATIENT)
Dept: RADIATION ONCOLOGY | Facility: CLINIC | Age: 76
End: 2021-12-30
Attending: RADIOLOGY

## 2021-12-30 ENCOUNTER — PROCEDURE VISIT (OUTPATIENT)
Dept: UROLOGY | Facility: CLINIC | Age: 76
End: 2021-12-30
Payer: MEDICARE

## 2021-12-30 VITALS
WEIGHT: 168 LBS | OXYGEN SATURATION: 95 % | SYSTOLIC BLOOD PRESSURE: 130 MMHG | DIASTOLIC BLOOD PRESSURE: 76 MMHG | TEMPERATURE: 98 F | HEART RATE: 60 BPM | BODY MASS INDEX: 32.81 KG/M2

## 2021-12-30 VITALS — SYSTOLIC BLOOD PRESSURE: 124 MMHG | HEART RATE: 59 BPM | DIASTOLIC BLOOD PRESSURE: 82 MMHG

## 2021-12-30 DIAGNOSIS — R33.9 URINARY RETENTION: ICD-10-CM

## 2021-12-30 DIAGNOSIS — C67.2 MALIGNANT NEOPLASM OF LATERAL WALL OF URINARY BLADDER (HCC): Primary | ICD-10-CM

## 2021-12-30 PROCEDURE — 77263 THER RADIOLOGY TX PLNG CPLX: CPT | Performed by: RADIOLOGY

## 2021-12-30 PROCEDURE — 99211 OFF/OP EST MAY X REQ PHY/QHP: CPT | Performed by: RADIOLOGY

## 2021-12-30 PROCEDURE — 51702 INSERT TEMP BLADDER CATH: CPT

## 2021-12-30 PROCEDURE — 77334 RADIATION TREATMENT AID(S): CPT | Performed by: RADIOLOGY

## 2021-12-30 PROCEDURE — 99213 OFFICE O/P EST LOW 20 MIN: CPT | Performed by: RADIOLOGY

## 2022-01-03 ENCOUNTER — APPOINTMENT (OUTPATIENT)
Dept: RADIATION ONCOLOGY | Facility: CLINIC | Age: 77
End: 2022-01-03
Payer: MEDICARE

## 2022-01-03 PROCEDURE — 77300 RADIATION THERAPY DOSE PLAN: CPT | Performed by: RADIOLOGY

## 2022-01-03 PROCEDURE — 77338 DESIGN MLC DEVICE FOR IMRT: CPT | Performed by: RADIOLOGY

## 2022-01-03 PROCEDURE — 77301 RADIOTHERAPY DOSE PLAN IMRT: CPT | Performed by: RADIOLOGY

## 2022-01-05 ENCOUNTER — APPOINTMENT (OUTPATIENT)
Dept: RADIATION ONCOLOGY | Facility: CLINIC | Age: 77
End: 2022-01-05
Payer: MEDICARE

## 2022-01-05 ENCOUNTER — APPOINTMENT (OUTPATIENT)
Dept: RADIATION ONCOLOGY | Facility: CLINIC | Age: 77
End: 2022-01-05
Attending: RADIOLOGY
Payer: MEDICARE

## 2022-01-05 DIAGNOSIS — C67.2 MALIGNANT NEOPLASM OF LATERAL WALL OF URINARY BLADDER (HCC): Primary | ICD-10-CM

## 2022-01-05 PROCEDURE — 77014 CHG CT GUIDANCE PLACEMENT RAD THERAPY FIELDS: CPT | Performed by: RADIOLOGY

## 2022-01-05 PROCEDURE — 77386 HB NTSTY MODUL RAD TX DLVR CPLX: CPT | Performed by: RADIOLOGY

## 2022-01-05 PROCEDURE — 77427 RADIATION TX MANAGEMENT X5: CPT | Performed by: RADIOLOGY

## 2022-01-06 ENCOUNTER — APPOINTMENT (OUTPATIENT)
Dept: RADIATION ONCOLOGY | Facility: CLINIC | Age: 77
End: 2022-01-06
Attending: RADIOLOGY
Payer: MEDICARE

## 2022-01-06 PROCEDURE — 77014 CHG CT GUIDANCE PLACEMENT RAD THERAPY FIELDS: CPT | Performed by: RADIOLOGY

## 2022-01-06 PROCEDURE — 77386 HB NTSTY MODUL RAD TX DLVR CPLX: CPT | Performed by: RADIOLOGY

## 2022-01-07 ENCOUNTER — APPOINTMENT (OUTPATIENT)
Dept: RADIATION ONCOLOGY | Facility: CLINIC | Age: 77
End: 2022-01-07
Attending: RADIOLOGY
Payer: MEDICARE

## 2022-01-07 PROCEDURE — 77386 HB NTSTY MODUL RAD TX DLVR CPLX: CPT | Performed by: RADIOLOGY

## 2022-01-10 ENCOUNTER — APPOINTMENT (OUTPATIENT)
Dept: RADIATION ONCOLOGY | Facility: CLINIC | Age: 77
End: 2022-01-10
Attending: RADIOLOGY
Payer: MEDICARE

## 2022-01-10 PROCEDURE — 77386 HB NTSTY MODUL RAD TX DLVR CPLX: CPT | Performed by: RADIOLOGY

## 2022-01-11 ENCOUNTER — APPOINTMENT (OUTPATIENT)
Dept: RADIATION ONCOLOGY | Facility: CLINIC | Age: 77
End: 2022-01-11
Attending: RADIOLOGY
Payer: MEDICARE

## 2022-01-11 PROCEDURE — 77014 CHG CT GUIDANCE PLACEMENT RAD THERAPY FIELDS: CPT | Performed by: RADIOLOGY

## 2022-01-11 PROCEDURE — 77336 RADIATION PHYSICS CONSULT: CPT | Performed by: RADIOLOGY

## 2022-01-11 PROCEDURE — 77386 HB NTSTY MODUL RAD TX DLVR CPLX: CPT | Performed by: RADIOLOGY

## 2022-01-12 ENCOUNTER — APPOINTMENT (OUTPATIENT)
Dept: RADIATION ONCOLOGY | Facility: CLINIC | Age: 77
End: 2022-01-12
Attending: RADIOLOGY
Payer: MEDICARE

## 2022-01-12 PROCEDURE — 77427 RADIATION TX MANAGEMENT X5: CPT | Performed by: RADIOLOGY

## 2022-01-12 PROCEDURE — 77386 HB NTSTY MODUL RAD TX DLVR CPLX: CPT | Performed by: RADIOLOGY

## 2022-01-13 ENCOUNTER — APPOINTMENT (OUTPATIENT)
Dept: RADIATION ONCOLOGY | Facility: CLINIC | Age: 77
End: 2022-01-13
Attending: RADIOLOGY
Payer: MEDICARE

## 2022-01-13 PROCEDURE — 77386 HB NTSTY MODUL RAD TX DLVR CPLX: CPT | Performed by: RADIOLOGY

## 2022-01-13 PROCEDURE — 77014 CHG CT GUIDANCE PLACEMENT RAD THERAPY FIELDS: CPT | Performed by: RADIOLOGY

## 2022-01-14 ENCOUNTER — APPOINTMENT (OUTPATIENT)
Dept: RADIATION ONCOLOGY | Facility: CLINIC | Age: 77
End: 2022-01-14
Attending: RADIOLOGY
Payer: MEDICARE

## 2022-01-14 PROCEDURE — 77386 HB NTSTY MODUL RAD TX DLVR CPLX: CPT | Performed by: RADIOLOGY

## 2022-01-14 PROCEDURE — 77014 CHG CT GUIDANCE PLACEMENT RAD THERAPY FIELDS: CPT | Performed by: RADIOLOGY

## 2022-01-17 ENCOUNTER — HOSPITAL ENCOUNTER (OUTPATIENT)
Dept: CT IMAGING | Facility: HOSPITAL | Age: 77
Discharge: HOME | End: 2022-01-17
Attending: INTERNAL MEDICINE
Payer: MEDICARE

## 2022-01-17 ENCOUNTER — APPOINTMENT (OUTPATIENT)
Dept: RADIATION ONCOLOGY | Facility: CLINIC | Age: 77
End: 2022-01-17
Attending: RADIOLOGY
Payer: MEDICARE

## 2022-01-17 DIAGNOSIS — C68.9 UROTHELIAL CARCINOMA (HCC): ICD-10-CM

## 2022-01-17 DIAGNOSIS — R91.1 LUNG NODULE: ICD-10-CM

## 2022-01-17 PROCEDURE — 77014 CHG CT GUIDANCE PLACEMENT RAD THERAPY FIELDS: CPT | Performed by: RADIOLOGY

## 2022-01-17 PROCEDURE — 71250 CT THORAX DX C-: CPT

## 2022-01-17 PROCEDURE — 77386 HB NTSTY MODUL RAD TX DLVR CPLX: CPT | Performed by: RADIOLOGY

## 2022-01-17 PROCEDURE — 74176 CT ABD & PELVIS W/O CONTRAST: CPT

## 2022-01-18 ENCOUNTER — APPOINTMENT (OUTPATIENT)
Dept: RADIATION ONCOLOGY | Facility: CLINIC | Age: 77
End: 2022-01-18
Attending: RADIOLOGY
Payer: MEDICARE

## 2022-01-18 ENCOUNTER — TELEPHONE (OUTPATIENT)
Dept: HEMATOLOGY ONCOLOGY | Facility: CLINIC | Age: 77
End: 2022-01-18

## 2022-01-18 PROCEDURE — 77014 CHG CT GUIDANCE PLACEMENT RAD THERAPY FIELDS: CPT | Performed by: RADIOLOGY

## 2022-01-18 PROCEDURE — 77336 RADIATION PHYSICS CONSULT: CPT | Performed by: RADIOLOGY

## 2022-01-18 PROCEDURE — 77386 HB NTSTY MODUL RAD TX DLVR CPLX: CPT | Performed by: RADIOLOGY

## 2022-01-18 NOTE — TELEPHONE ENCOUNTER
Received call from Radiology stating CT completed on 1/12/22 has significant finding  Reviewed with Dr Juana Maciel, per him no intervention at this time  Will see patient as scheduled

## 2022-01-19 ENCOUNTER — APPOINTMENT (OUTPATIENT)
Dept: RADIATION ONCOLOGY | Facility: CLINIC | Age: 77
End: 2022-01-19
Attending: RADIOLOGY
Payer: MEDICARE

## 2022-01-19 PROCEDURE — 77427 RADIATION TX MANAGEMENT X5: CPT | Performed by: RADIOLOGY

## 2022-01-19 PROCEDURE — 77386 HB NTSTY MODUL RAD TX DLVR CPLX: CPT | Performed by: RADIOLOGY

## 2022-01-20 ENCOUNTER — APPOINTMENT (OUTPATIENT)
Dept: RADIATION ONCOLOGY | Facility: CLINIC | Age: 77
End: 2022-01-20
Attending: RADIOLOGY
Payer: MEDICARE

## 2022-01-20 PROCEDURE — 77014 CHG CT GUIDANCE PLACEMENT RAD THERAPY FIELDS: CPT | Performed by: RADIOLOGY

## 2022-01-20 PROCEDURE — 77386 HB NTSTY MODUL RAD TX DLVR CPLX: CPT | Performed by: RADIOLOGY

## 2022-01-21 ENCOUNTER — APPOINTMENT (OUTPATIENT)
Dept: RADIATION ONCOLOGY | Facility: CLINIC | Age: 77
End: 2022-01-21
Attending: RADIOLOGY
Payer: MEDICARE

## 2022-01-21 PROCEDURE — 77386 HB NTSTY MODUL RAD TX DLVR CPLX: CPT | Performed by: RADIOLOGY

## 2022-01-21 PROCEDURE — 77014 CHG CT GUIDANCE PLACEMENT RAD THERAPY FIELDS: CPT | Performed by: RADIOLOGY

## 2022-01-24 ENCOUNTER — APPOINTMENT (OUTPATIENT)
Dept: RADIATION ONCOLOGY | Facility: CLINIC | Age: 77
End: 2022-01-24
Attending: RADIOLOGY
Payer: MEDICARE

## 2022-01-24 PROCEDURE — 77386 HB NTSTY MODUL RAD TX DLVR CPLX: CPT | Performed by: RADIOLOGY

## 2022-01-25 ENCOUNTER — APPOINTMENT (OUTPATIENT)
Dept: RADIATION ONCOLOGY | Facility: CLINIC | Age: 77
End: 2022-01-25
Attending: RADIOLOGY
Payer: MEDICARE

## 2022-01-25 PROCEDURE — 77014 CHG CT GUIDANCE PLACEMENT RAD THERAPY FIELDS: CPT | Performed by: RADIOLOGY

## 2022-01-25 PROCEDURE — 77336 RADIATION PHYSICS CONSULT: CPT | Performed by: RADIOLOGY

## 2022-01-25 PROCEDURE — 77386 HB NTSTY MODUL RAD TX DLVR CPLX: CPT | Performed by: RADIOLOGY

## 2022-01-26 ENCOUNTER — APPOINTMENT (OUTPATIENT)
Dept: RADIATION ONCOLOGY | Facility: CLINIC | Age: 77
End: 2022-01-26
Attending: RADIOLOGY
Payer: MEDICARE

## 2022-01-26 PROCEDURE — 77014 CHG CT GUIDANCE PLACEMENT RAD THERAPY FIELDS: CPT | Performed by: RADIOLOGY

## 2022-01-26 PROCEDURE — 77427 RADIATION TX MANAGEMENT X5: CPT | Performed by: RADIOLOGY

## 2022-01-26 PROCEDURE — 77386 HB NTSTY MODUL RAD TX DLVR CPLX: CPT | Performed by: RADIOLOGY

## 2022-01-27 ENCOUNTER — APPOINTMENT (OUTPATIENT)
Dept: RADIATION ONCOLOGY | Facility: CLINIC | Age: 77
End: 2022-01-27
Attending: RADIOLOGY
Payer: MEDICARE

## 2022-01-27 PROCEDURE — 77014 CHG CT GUIDANCE PLACEMENT RAD THERAPY FIELDS: CPT | Performed by: RADIOLOGY

## 2022-01-27 PROCEDURE — 77386 HB NTSTY MODUL RAD TX DLVR CPLX: CPT | Performed by: RADIOLOGY

## 2022-01-28 ENCOUNTER — APPOINTMENT (OUTPATIENT)
Dept: RADIATION ONCOLOGY | Facility: CLINIC | Age: 77
End: 2022-01-28
Attending: RADIOLOGY
Payer: MEDICARE

## 2022-01-28 ENCOUNTER — PROCEDURE VISIT (OUTPATIENT)
Dept: UROLOGY | Facility: CLINIC | Age: 77
End: 2022-01-28
Payer: MEDICARE

## 2022-01-28 ENCOUNTER — TELEMEDICINE (OUTPATIENT)
Dept: HEMATOLOGY ONCOLOGY | Facility: CLINIC | Age: 77
End: 2022-01-28
Payer: MEDICARE

## 2022-01-28 DIAGNOSIS — R91.1 LUNG NODULE: ICD-10-CM

## 2022-01-28 DIAGNOSIS — C68.9 UROTHELIAL CARCINOMA (HCC): Primary | ICD-10-CM

## 2022-01-28 DIAGNOSIS — Z91.89 AT RISK FOR INFECTION ASSOCIATED WITH FOLEY CATHETER: Primary | ICD-10-CM

## 2022-01-28 DIAGNOSIS — D64.9 ANEMIA, UNSPECIFIED TYPE: ICD-10-CM

## 2022-01-28 DIAGNOSIS — I82.4Y9 DEEP VEIN THROMBOSIS (DVT) OF PROXIMAL LOWER EXTREMITY, UNSPECIFIED CHRONICITY, UNSPECIFIED LATERALITY (HCC): ICD-10-CM

## 2022-01-28 PROCEDURE — 77014 CHG CT GUIDANCE PLACEMENT RAD THERAPY FIELDS: CPT | Performed by: RADIOLOGY

## 2022-01-28 PROCEDURE — 99443 PR PHYS/QHP TELEPHONE EVALUATION 21-30 MIN: CPT | Performed by: INTERNAL MEDICINE

## 2022-01-28 PROCEDURE — 51702 INSERT TEMP BLADDER CATH: CPT

## 2022-01-28 PROCEDURE — 77386 HB NTSTY MODUL RAD TX DLVR CPLX: CPT | Performed by: RADIOLOGY

## 2022-01-28 RX ORDER — CEPHALEXIN 500 MG/1
500 CAPSULE ORAL EVERY 12 HOURS SCHEDULED
Qty: 6 CAPSULE | Refills: 0 | Status: SHIPPED | OUTPATIENT
Start: 2022-01-28 | End: 2022-01-28 | Stop reason: SDUPTHER

## 2022-01-28 RX ORDER — CEPHALEXIN 500 MG/1
500 CAPSULE ORAL EVERY 12 HOURS SCHEDULED
Qty: 6 CAPSULE | Refills: 0 | Status: SHIPPED | OUTPATIENT
Start: 2022-01-28 | End: 2022-01-31

## 2022-01-28 NOTE — PROGRESS NOTES
1/28/2022  Aury Bustamante is a 77 y o  female  [id number]    Diagnosis:  Chief Complaint     Urinary Catheter Insertion or Check          Patient presents for routine catheter change managed by Dr Moris Lyles:  -Patient to follow up as scheduled for next cath change  -Patient to take prescribed lorazepam 1 hour PTV for catheter change    -Patient instructed to call with any questions or concerns in the meantime  Patient arrived from [address]  Patient was covered in own stool  Patient's daughter was able to clean patient up  When pulling out baca catheter there was stool on catheter  AP prescribed antibiotics due to possibility of infection from stool    Patient was unable to relax during baca change, Yannick De La O and Nabil Voss MA assisted during baca change  Procedure:    Universal Protocol:  Consent: Verbal consent obtained  Risks and benefits: risks, benefits and alternatives were discussed  Consent given by: patient  Patient understanding: patient states understanding of the procedure being performed  Patient consent: the patient's understanding of the procedure matches consent given  Procedure consent: procedure consent matches procedure scheduled  Patient identity confirmed: verbally with patient      Bladder catheterization    Date/Time: 1/28/2022 11:32 AM  Performed by: Elio Goldman  Authorized by: Elieser Sifuentes MD     Consent:     Consent given by:  Patient  Universal protocol:     Procedure explained and questions answered to patient or proxy's satisfaction: yes      Patient identity confirmed:  Verbally with patient  Pre-procedure details:     Procedure purpose:  Therapeutic    Preparation: Patient was prepped and draped in usual sterile fashion    Anesthesia (see MAR for exact dosages):      Anesthesia method:  None  Procedure details:     Bladder irrigation: no      Catheter insertion:  Indwelling    Approach: natural orifice      Catheter type:  Latex and Baca    Catheter size:  20 Fr    Number of attempts:  1    Successful placement: yes      Urine characteristics:  Yellow  Post-procedure details:     Patient tolerance of procedure: Tolerated well, no immediate complications  Comments:      Baca catheter was removed after deflation of intact balloon  New baca inserted and inflated 10ml in baca balloon  Stat-lock and leg bag were connected to catheter              Ghanshyam Navarrete LPN

## 2022-01-28 NOTE — PROGRESS NOTES
Virtual Brief Visit    Patient is located in the following state in which I hold an active license [place]      Patient has been receiving palliative radiation to bladder for the past 2 weeks has been tolerating very well, hematuria has stopped for 2 weeks  Assessment/Plan:    Problem List Items Addressed This Visit        Cardiovascular and Mediastinum    Deep vein thrombosis (DVT) of proximal lower extremity (HCC)       Other    [facility] ([facility] ) - Primary    Lung nodule      Other Visit Diagnoses     Anemia, unspecified type               1  Urothelial carcinoma ([facility] )    - currently on palliative radiation to be finished in 2 days  Will continue monitor by checking CT scan in 3 month    2  Lung nodule    - as above    3  Anemia, unspecified type    - last hemoglobin around 9 6  Will continue close monitor by repeating another labs and iron panel in 3 months  Given patient's hematuria is stopped, expecting hemoglobin will improve  4  Deep vein thrombosis (DVT) of proximal lower extremity, unspecified chronicity, unspecified laterality (HCC)    - not able to tolerate anticoagulation because of hematuria, status post IVC filter           Recent Visits  No visits were found meeting these conditions  Showing recent visits within past 7 days and meeting all other requirements  Future Appointments  No visits were found meeting these conditions    Showing future appointments within next 150 days and meeting all other requirements         I spent 25 minutes directly with the patient during this visit

## 2022-01-31 ENCOUNTER — APPOINTMENT (OUTPATIENT)
Dept: RADIATION ONCOLOGY | Facility: CLINIC | Age: 77
End: 2022-01-31
Attending: RADIOLOGY
Payer: MEDICARE

## 2022-01-31 PROCEDURE — 77386 HB NTSTY MODUL RAD TX DLVR CPLX: CPT | Performed by: RADIOLOGY

## 2022-02-01 ENCOUNTER — APPOINTMENT (OUTPATIENT)
Dept: RADIATION ONCOLOGY | Facility: CLINIC | Age: 77
End: 2022-02-01
Attending: RADIOLOGY
Payer: MEDICARE

## 2022-02-01 PROCEDURE — 77014 CHG CT GUIDANCE PLACEMENT RAD THERAPY FIELDS: CPT | Performed by: RADIOLOGY

## 2022-02-01 PROCEDURE — 77336 RADIATION PHYSICS CONSULT: CPT | Performed by: RADIOLOGY

## 2022-02-01 PROCEDURE — 77386 HB NTSTY MODUL RAD TX DLVR CPLX: CPT | Performed by: RADIOLOGY

## 2022-02-02 ENCOUNTER — APPOINTMENT (OUTPATIENT)
Dept: RADIATION ONCOLOGY | Facility: CLINIC | Age: 77
End: 2022-02-02
Payer: MEDICARE

## 2022-02-03 ENCOUNTER — APPOINTMENT (OUTPATIENT)
Dept: RADIATION ONCOLOGY | Facility: CLINIC | Age: 77
End: 2022-02-03
Payer: MEDICARE

## 2022-02-04 ENCOUNTER — TELEPHONE (OUTPATIENT)
Dept: RADIATION ONCOLOGY | Facility: CLINIC | Age: 77
End: 2022-02-04

## 2022-02-04 ENCOUNTER — APPOINTMENT (OUTPATIENT)
Dept: RADIATION ONCOLOGY | Facility: CLINIC | Age: 77
End: 2022-02-04
Payer: MEDICARE

## 2022-02-04 DIAGNOSIS — N32.89 BLADDER SPASM: Primary | ICD-10-CM

## 2022-02-04 NOTE — TELEPHONE ENCOUNTER
Jean Welch completed RT to the bladder 2/1/22  Mila Ervin (daughter) called  She stated that Jean Welch has C/O of intermittent pelvic pain  She has a chronic Davis for several years and this is new onset pain  Mila Ervin has concerns that she is having bladder spasms and requesting something to help with them  Prescriptions should be called to [facility] [phone] since their fax is down

## 2022-02-04 NOTE — TELEPHONE ENCOUNTER
Reviewed with Dr Susi Moscoso since Dr Barrington Huynh not in office  Dr Susi Moscoso requested to make Urology aware for management

## 2022-02-07 ENCOUNTER — APPOINTMENT (OUTPATIENT)
Dept: RADIATION ONCOLOGY | Facility: CLINIC | Age: 77
End: 2022-02-07
Payer: MEDICARE

## 2022-02-07 RX ORDER — OXYBUTYNIN CHLORIDE 5 MG/1
5 TABLET ORAL 3 TIMES DAILY PRN
Qty: 30 TABLET | Refills: 1 | Status: SHIPPED | OUTPATIENT
Start: 2022-02-07 | End: 2022-03-25 | Stop reason: SDUPTHER

## 2022-02-07 NOTE — TELEPHONE ENCOUNTER
Called and LM on Shirley's voicemail letting her know that we have sent a prescription for Oxybutynin to the pharmacy  Office number provided incase of questions

## 2022-02-08 ENCOUNTER — APPOINTMENT (OUTPATIENT)
Dept: RADIATION ONCOLOGY | Facility: CLINIC | Age: 77
End: 2022-02-08
Payer: MEDICARE

## 2022-02-16 NOTE — ED PROVIDER NOTES
History  Chief Complaint   Patient presents with    Fall     Pt  is a resident at [facility] where she slide out of a wheelchair earlier today  Pt  does not remember the event  Pt  is not on blood thinners  Pt  not sure if she hit her head  Patient is a 69-year-old female  She is a resident at nursing home  She slipped out of a wheelchair today  She is without complaints  She did not strike her head  There is no loss of consciousness  No oral anticoagulant use  Tetanus vaccination is up-to-date  The injury occurred suddenly in shortly prior to arrival           Prior to Admission Medications   Prescriptions Last Dose Informant Patient Reported? Taking?    Acetaminophen 325 MG CAPS  Outside Facility (Specify) Yes No   Sig: Take by mouth   Cholecalciferol (VITAMIN D) 2000 units CAPS  Outside Facility (Specify) No No   Sig: Take 1 capsule (2,000 Units total) by mouth daily   Citric Hf-Hzbnrngoigr-Wq Carb (RENACIDIN) SOLN  Outside Facility (Specify) Yes No   Sig: Indications: 60ml to irrigate baca once a week on Friday   Hydromorphone HCl 1 MG/ML LIQD   Yes No   Sig: Take 2 5 mg by mouth every 3 (three) hours as needed   Hyoscyamine Sulfate (HYOSCYAMINE PO)   Yes No   Sig: Take 0 125 mg by mouth every 4 (four) hours as needed (secretions)   LORazepam (ATIVAN) 0 5 mg tablet   Yes No   Si 5 mg daily at bedtime    albuterol (PROVENTIL HFA,VENTOLIN HFA) 90 mcg/act inhaler  Outside Facility (Specify) No No   Sig: Inhale 2 puffs every 6 (six) hours as needed for wheezing   allopurinol (ZYLOPRIM) 300 mg tablet   Yes No   apixaban (Eliquis) 2 5 mg   Yes No   Sig: Take 2 5 mg by mouth 2 (two) times a day For 14 days   aspirin 81 mg chewable tablet   Yes No   Sig: Chew 81 mg daily   atorvastatin (LIPITOR) 10 mg tablet  Outside Facility (Specify) No No   Sig: Take 1 tablet (10 mg total) by mouth daily   betamethasone dipropionate (DIPROSONE) 0 05 % cream  Outside Facility (Specify) Yes No   Sig: Apply topically 3 (three) times a day   bisacodyl (DULCOLAX) 10 mg suppository   Yes No   Sig: Insert 10 mg into the rectum daily as needed for constipation   buPROPion (WELLBUTRIN SR) 150 mg 12 hr tablet  Outside Facility (Specify) No No   Sig: Take 1 tablet (150 mg total) by mouth daily   carBAMazepine (TEGretol) 100 mg chewable tablet  Outside Facility (Specify) No No   Sig: Chew 1 tablet (100 mg total) 3 (three) times a day   clopidogrel (PLAVIX) 75 mg tablet  Outside Facility (Specify) No No   Sig: Take 1 tablet (75 mg total) by mouth daily   digoxin (LANOXIN) 0 125 mg tablet  Outside Facility (Specify) Yes No   Sig: Take 125 mcg by mouth daily   divalproex sodium (DEPAKOTE) 250 mg EC tablet   Yes No   Sig: Take 250 mg by mouth every 12 (twelve) hours    famotidine (PEPCID) 20 mg tablet   Yes No   fenofibrate micronized (LOFIBRA) 67 MG capsule  Outside Facility (Specify) No No   Sig: Take 1 capsule (67 mg total) by mouth daily with breakfast   Patient taking differently: Take 134 mg by mouth daily with breakfast    ferrous sulfate 325 (65 Fe) mg tablet  Outside Facility (Specify) No No   Sig: Take 1 tablet (325 mg total) by mouth 2 (two) times a day   fluticasone-salmeterol (ADVAIR, WIXELA) 250-50 mcg/dose inhaler   No No   Sig: Inhale 1 puff 2 (two) times a day Rinse mouth after use     furosemide (LASIX) 20 mg tablet   No No   Sig: Take 1 tablet (20 mg total) by mouth daily   haloperidol (HALDOL) 1 mg tablet   Yes No   Sig: Take 1 mg by mouth as needed for agitation   ipratropium-albuterol (DUO-NEB) 0 5-2 5 mg/3 mL nebulizer solution  Outside Facility (Specify) Yes No   Sig: Take 3 mL by nebulization 4 (four) times a day   lactobacillus acidophilus  Outside Facility (Specify) Yes No   Sig: Take 1 capsule by mouth daily   levothyroxine 100 mcg tablet   Yes No   melatonin 3 mg   Yes No   Sig: Take 6 mg by mouth daily at bedtime   metoprolol tartrate (LOPRESSOR) 100 mg tablet  Outside Facility (Specify) No No   Sig: Take 1 tablet (100 mg total) by mouth every 12 (twelve) hours   nitroglycerin (NITROSTAT) 0 4 mg SL tablet  Outside Facility (Specify) No No   Sig: Place 1 tablet (0 4 mg total) under the tongue every 5 (five) minutes as needed for chest pain   nystatin (MYCOSTATIN) powder   Yes No   Sig: Apply topically as needed (abdominal irritation)   polyethylene glycol (MIRALAX) 17 g packet   Yes No   Sig: Take 17 g by mouth daily   potassium chloride (K-DUR,KLOR-CON) 20 mEq tablet  Outside Facility (Specify) No No   Sig: Take 1 tablet (20 mEq total) by mouth daily   prochlorperazine (COMPAZINE) 10 mg tablet   Yes No   Sig: Take 10 mg by mouth every 6 (six) hours as needed for nausea or vomiting   senna (SENOKOT) 8 6 MG tablet   Yes No   Sig: Take 1 tablet by mouth daily      Facility-Administered Medications: None       Past Medical History:   Diagnosis Date    Atrial fibrillation ([facility] )     Brain aneurysm     CAD (coronary artery disease)     Cardiac disease     had stents 12 years ago in [facility]    CKD (chronic kidney disease) stage 3, GFR 30-59 ml/min ([facility]) ckd    COPD (chronic obstructive pulmonary disease) ([facility])     Dementia ([facility])     Diastolic CHF, chronic ([facility])     GERD (gastroesophageal reflux disease)     Hyperlipidemia     Hyperlipidemia     Hypertension     Hypothyroidism     Migraine     PAD (peripheral artery disease) ([facility])     Renal disorder     Seizures ([facility] )     Stroke [facility])        Past Surgical History:   Procedure Laterality Date    APPENDECTOMY      ARTERIOGRAM N/A 12/7/2018    Procedure: ARTERIOGRAM WITH STENT PLACEMENT;  Surgeon: Teresa Reddy MD;  Location: [facility];  Service: Vascular    BLADDER TUMOR EXCISION      BRAIN SURGERY      1998  clip right frontal lobe    CARDIAC SURGERY      COLONOSCOPY      CORONARY STENT PLACEMENT      5 stents    EYE SURGERY      IR AORTAGRAM WITH RUN-OFF  12/7/2018    MANDIBLE FRACTURE SURGERY      TONSILLECTOMY      TUBAL LIGATION      UTERINE FIBROID SURGERY         Family History   Problem Relation Age of Onset    Heart disease Father     Parkinsonism Father     Macular degeneration Mother     Glaucoma Mother     Diabetes Brother     Heart disease Brother      I have reviewed and agree with the history as documented  E-Cigarette/Vaping    E-Cigarette Use Never User      E-Cigarette/Vaping Substances    Nicotine No     THC No     CBD No     Flavoring No     Other No     Unknown No      Social History     Tobacco Use    Smoking status: Former Smoker     Packs/day: 5 00     Years: 40 00     Pack years: 200 00     Quit date: 2007     Years since quittin 7    Smokeless tobacco: Never Used   Substance Use Topics    Alcohol use: Not Currently     Alcohol/week: 0 0 standard drinks     Frequency: Never    Drug use: No       Review of Systems   Unable to perform ROS: Dementia       Physical Exam  Physical Exam  Vitals signs reviewed  Constitutional:       General: She is not in acute distress  Appearance: Normal appearance  She is obese  HENT:      Head: Normocephalic and atraumatic  No raccoon eyes, Jones's sign, abrasion, contusion, masses or laceration  Jaw: There is normal jaw occlusion  No trismus, tenderness, swelling, pain on movement or malocclusion  Right Ear: No laceration, drainage, swelling or tenderness  Left Ear: No laceration, drainage, swelling or tenderness  Nose: Nose normal       Mouth/Throat:      Mouth: Mucous membranes are moist       Pharynx: Oropharynx is clear  Eyes:      General: Lids are normal  No scleral icterus  Right eye: No discharge  Left eye: No discharge  Extraocular Movements: Extraocular movements intact  Conjunctiva/sclera: Conjunctivae normal       Pupils: Pupils are equal, round, and reactive to light  Neck:      Musculoskeletal: Neck supple  Comments: No midline cervical tenderness    Cardiovascular:      Rate and Rhythm: Normal rate and regular rhythm  Pulses: Normal pulses  Heart sounds: Normal heart sounds  No murmur  No friction rub  No gallop  Pulmonary:      Effort: Pulmonary effort is normal       Breath sounds: Normal breath sounds  Chest:      Chest wall: No tenderness  Abdominal:      General: Abdomen is flat  Bowel sounds are normal  There is no distension  Palpations: Abdomen is soft  Tenderness: There is no abdominal tenderness  There is no right CVA tenderness, left CVA tenderness, guarding or rebound  Musculoskeletal:         General: Tenderness and signs of injury present  No swelling or deformity  Comments: There is a moderate-sized abrasion to the middle of the back  There is tenderness to the same  There is tenderness to the midthoracic spine as well as some of the posterior thorax  There is no lumbar tenderness  Extremities are all nontender and atraumatic  Skin:     General: Skin is warm and dry  Coloration: Skin is not jaundiced or pale  Findings: No erythema, lesion or rash  Neurological:      General: No focal deficit present  Mental Status: She is alert and oriented to person, place, and time  Cranial Nerves: No cranial nerve deficit  Sensory: Sensation is intact  No sensory deficit  Motor: Motor function is intact  No weakness     Psychiatric:         Mood and Affect: Mood normal          Behavior: Behavior normal          Vital Signs  ED Triage Vitals   Temp Pulse Respirations Blood Pressure SpO2   -- 06/01/21 1645 06/01/21 1645 06/01/21 1645 06/01/21 1645    (!) 52 18 134/65 96 %      Temp src Heart Rate Source Patient Position - Orthostatic VS BP Location FiO2 (%)   -- 06/01/21 1645 06/01/21 1645 06/01/21 1645 --    Monitor Sitting Right arm       Pain Score       06/01/21 1630       No Pain           Vitals:    06/01/21 1645 06/01/21 1730   BP: 134/65 154/69   Pulse: (!) 52 (!) 51   Patient Position - Orthostatic VS: Sitting Sitting         Visual Acuity  Visual Acuity      Most Recent Value   L Pupil Size (mm)  2   R Pupil Size (mm)  2          ED Medications  Medications - No data to display    Diagnostic Studies  Results Reviewed     None                 CT chest abdomen pelvis wo contrast   Final Result by Gerald Ornelas DO (06/01 1840)      No acute traumatic injury identified  Multiple pulmonary nodules with lobulated nodular opacity in the left lower lobe  Findings are essentially unchanged since study of 12/10/2017  Workstation performed: [id number]         CT recon only thoracolumbar (no charge)   Final Result by Gerald Ornelas DO (06/01 1840)      No fracture or traumatic subluxation  Mild multilevel degenerative changes            Workstation performed: [id number]                    Procedures  Procedures         ED Course               Identification of Seniors at Risk      Most Recent Value   (ISAR) Identification of Seniors at Risk   Before the illness or injury that brought you to the Emergency, did you need someone to help you on a regular basis? 0 Filed at: 06/01/2021 1654   In the last 24 hours, have you needed more help than usual?  0 Filed at: 06/01/2021 1654   Have you been hospitalized for one or more nights during the past 6 months? 0 Filed at: 06/01/2021 1654   In general, do you see well? 1 Filed at: 06/01/2021 1654   In general, do you have serious problems with your memory? 1 Filed at: 06/01/2021 1654   Do you take more than three different medications every day? 1 Filed at: 06/01/2021 1654   ISAR Score  3 Filed at: 06/01/2021 1654                                  MDM  Number of Diagnoses or Management Options  Diagnosis management comments: There is no thoracic fracture subluxation  No rib fracture  No pneumothorax  No solid organ injury  Appropriate for discharge and outpatient management         Amount and/or Complexity of Data Reviewed  Tests in the radiology Nonverbal section of CPT®: ordered and reviewed        Disposition  Final diagnoses:   Abrasion - Upper back   Fall from wheelchair, initial encounter   Thoracic back pain     Time reflects when diagnosis was documented in both MDM as applicable and the Disposition within this note     Time User Action Codes Description Comment    6/1/2021  6:45 PM Hernandez De Souzade  8XXA] Abrasion     6/1/2021  6:45 PM Clari Cuello  8XXA] Abrasion Upper back    6/1/2021  6:46 PM Esther Soteloen Add [Q04  0XXA] Fall from wheelchair, initial encounter     6/1/2021  6:46 PM Esther Keen Add [M54 6] Thoracic back pain     6/1/2021  6:47 PM Hart, [address] [place] [address]  8XXA] Abrasion Upper back    6/1/2021  6:47 PM Esther Keen Modify [M54 6] Thoracic back pain       ED Disposition     ED Disposition Condition Date/Time Comment    Discharge Stable Tue Jun 1, 2021  6:45 PM Esteban Arrieta discharge to home/self care  Follow-up Information     Follow up With Specialties Details Why Contact Savannah Serna, DO Internal Medicine  As needed [address]  [phone]            Patient's Medications   Discharge Prescriptions    No medications on file     No discharge procedures on file      PDMP Review     None          ED Provider  Electronically Signed by           Demond Rodgers MD  06/01/21 6458

## 2022-02-25 ENCOUNTER — PROCEDURE VISIT (OUTPATIENT)
Dept: UROLOGY | Facility: CLINIC | Age: 77
End: 2022-02-25
Payer: MEDICARE

## 2022-02-25 VITALS — HEART RATE: 64 BPM | SYSTOLIC BLOOD PRESSURE: 128 MMHG | DIASTOLIC BLOOD PRESSURE: 80 MMHG

## 2022-02-25 DIAGNOSIS — C67.2 MALIGNANT NEOPLASM OF LATERAL WALL OF URINARY BLADDER (HCC): ICD-10-CM

## 2022-02-25 DIAGNOSIS — R33.9 URINARY RETENTION: Primary | ICD-10-CM

## 2022-02-25 PROCEDURE — 51702 INSERT TEMP BLADDER CATH: CPT

## 2022-02-25 NOTE — PROGRESS NOTES
2/25/2022  Yemi Gillis is a 77 y o  female  [id number]    Diagnosis:  Chief Complaint     Malignant neoplasm of lateral wall of urinary bladder [facility])          Patient presents for routine catheter change managed by Dr Harriet Ambrosio:  -patient to follow up as scheduled for routine baca change  -patient to take 1 tablet of ativan prior to baca change and more as needed  Max 3    -Patient instructed to call with any questions or concerns in the meantime  Vitals:    02/25/22 1113   BP: 128/80   Pulse: 64           Patient known for difficult catheter changes due to history of dementia  Patient was placed in supine postition with legs in stirups  Brooks Donovan, [place] and Keri Rock assisted with catheter change by holding each leg to assist with visualization of the urethra  Patient's mother provided music to help calm the patient  Catheter inserted without difficulty  Procedure:    Universal Protocol:  Consent: Verbal consent obtained  Risks and benefits: risks, benefits and alternatives were discussed  Consent given by: patient  Patient understanding: patient states understanding of the procedure being performed  Patient consent: the patient's understanding of the procedure matches consent given  Procedure consent: procedure consent matches procedure scheduled  Patient identity confirmed: verbally with patient      Bladder catheterization    Date/Time: 2/25/2022 12:22 PM  Performed by: Cat Maldonado  Authorized by: Liberty Ashby MD     Consent:     Consent given by:  Patient  Universal protocol:     Procedure explained and questions answered to patient or proxy's satisfaction: yes      Patient identity confirmed:  Verbally with patient  Pre-procedure details:     Procedure purpose:  Therapeutic  Anesthesia (see MAR for exact dosages):      Anesthesia method:  None  Procedure details:     Bladder irrigation: no      Catheter insertion:  Indwelling    Approach: natural orifice      Catheter type:  Latex and Baca    Catheter size:  18 Fr    Number of attempts:  1    Successful placement: yes      Urine characteristics:  Clear and yellow  Post-procedure details:     Patient tolerance of procedure: Tolerated well, no immediate complications  Comments:      Inflated 10ml sterile water into baca balloon  Leg bag attached to catheter              Shaniqua Dc LPN

## 2022-03-03 ENCOUNTER — CLINICAL SUPPORT (OUTPATIENT)
Dept: RADIATION ONCOLOGY | Facility: CLINIC | Age: 77
End: 2022-03-03
Attending: RADIOLOGY
Payer: COMMERCIAL

## 2022-03-03 VITALS
DIASTOLIC BLOOD PRESSURE: 78 MMHG | RESPIRATION RATE: 16 BRPM | OXYGEN SATURATION: 96 % | SYSTOLIC BLOOD PRESSURE: 150 MMHG | TEMPERATURE: 96.5 F | HEART RATE: 66 BPM

## 2022-03-03 DIAGNOSIS — C67.2 MALIGNANT NEOPLASM OF LATERAL WALL OF URINARY BLADDER (HCC): Primary | ICD-10-CM

## 2022-03-03 PROCEDURE — 99211 OFF/OP EST MAY X REQ PHY/QHP: CPT | Performed by: RADIOLOGY

## 2022-03-03 PROCEDURE — 99024 POSTOP FOLLOW-UP VISIT: CPT | Performed by: RADIOLOGY

## 2022-03-03 NOTE — PROGRESS NOTES
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  Her daughter who has power of  reconsidered radiation therapy to control bleeding  She presents today for one month EOT visit       22 Urology- baca change     Upcomin22 CT C/A/P  22 Dr Lesley Hernandez, Medical Oncology  3/25/22 Urology        Interval History:  Had urinary bleeding just before and at the time Baca catheter changed done every month  Historical Information   Oncology History   Malignant neoplasm of lateral wall of urinary bladder ([address] )   2021 Biopsy     Bladder, biopsy:    -  Portion of high-grade urothelial carcinoma      2021 Surgery    A  Urinary Bladder, Bladder tumor anterolateral wall:  - Invasive high-grade urothelial carcinoma  - Tumor invades lamina propria and muscularis propria  B  Urinary Bladder, Bladder tumor deep resection:  - Invasive high-grade urothelial carcinoma  - Tumor invades lamina propria and muscularis propria  2021 Initial Diagnosis    Malignant neoplasm of lateral wall of urinary bladder ([facility] [address] )     2022 - 2022 Radiation    Treatments:  Course: C1    Plan ID Energy Fractions Dose per Fraction (cGy) Dose Correction (cGy) Total Dose Delivered (cGy) Elapsed Days   Pelvis_Bladd 10X 20 /  200 0 4,000 27      Treatment Dates:  2022 - 2022            Past Medical History:   Diagnosis Date    Arthritis     Atrial fibrillation ([address] )     Brain aneurysm     CAD (coronary artery disease)     Cardiac disease     had stents 12 years ago in [facility]    Chronic kidney disease     CKD (chronic kidney disease) stage 3, GFR 30-59 ml/min (HCC) ckd    COPD (chronic obstructive pulmonary disease) (HCC)     Coronary artery disease     Dementia (HCC)     Diastolic CHF, chronic (HCC)     Disease of thyroid gland     GERD (gastroesophageal reflux disease)     Hyperlipidemia     Hyperlipidemia     Hypertension     Hypothyroidism     Migraine     PAD (peripheral artery disease) (HCC)     Psychiatric disorder     Renal disorder     [facility]     Stroke ([facility]     TIA (transient ischemic attack)      Past Surgical History:   Procedure Laterality Date    APPENDECTOMY      ARTERIOGRAM N/A 2018    Procedure: ARTERIOGRAM WITH STENT PLACEMENT;  Surgeon: Arely Reddy MD;  [facility];  Service: Vascular    BLADDER TUMOR EXCISION     [id number]  clip right frontal lobe    CARDIAC SURGERY      COLONOSCOPY      CORONARY STENT PLACEMENT      5 stents    EYE SURGERY      IR AORTAGRAM WITH RUN-OFF  2018    IR IVC FILTER PLACEMENT OPTIONAL/TEMPORARY  2021    MANDIBLE FRACTURE SURGERY      ID CYSTOURETHROSCOPY W/IRRIG & EVAC CLOTS N/A 2021    Procedure: CYSTOSCOPY EVACUATION OF CLOTS;  Surgeon: Herrera Live MD;  [facility];  Service: Urology    TONSILLECTOMY      TRANSURETHRAL RESECTION OF BLADDER TUMOR N/A 2021    Procedure: TRANSURETHRAL RESECTION OF BLADDER TUMOR (TURBT);   Surgeon: Herrera Live MD;  [facility];  Service: Urology    TUBAL LIGATION      UTERINE FIBROID SURGERY         Social History   Social History     Substance and Sexual Activity   Alcohol Use Not Currently    Alcohol/week: 0 0 standard drinks     Social History     Substance and Sexual Activity   Drug Use No     Social History     Tobacco Use   Smoking Status Former Smoker    Packs/day: 5 00    Years: 40 00    Pack years: 200 00    Quit date: 2007    Years since quittin 4   Smokeless Tobacco Never Used         Meds/Allergies Current Outpatient Medications:     acetaminophen (TYLENOL) 325 mg tablet, Take 650 mg by mouth every 6 (six) hours as needed for mild pain, Disp: , Rfl:     albuterol (PROVENTIL HFA,VENTOLIN HFA) 90 mcg/act inhaler, Inhale 2 puffs every 6 (six) hours as needed for wheezing, Disp: 1 Inhaler, Rfl: 0    bisacodyl (FLEET) 10 MG/30ML ENEM, Insert 10 mg into the rectum as needed for constipation, Disp: , Rfl:     buPROPion (WELLBUTRIN SR) 150 mg 12 hr tablet, Take 1 tablet (150 mg total) by mouth daily, Disp: 30 tablet, Rfl: 0    carBAMazepine (TEGretol) 100 mg chewable tablet, Chew 1 tablet (100 mg total) 3 (three) times a day, Disp: 90 tablet, Rfl: 0    Cholecalciferol (VITAMIN D) 2000 units CAPS, Take 1 capsule (2,000 Units total) by mouth daily, Disp: 14 capsule, Rfl: 0    digoxin (LANOXIN) 0 125 mg tablet, Take 125 mcg by mouth daily, Disp: , Rfl:     divalproex sodium (DEPAKOTE) 250 mg EC tablet, Take 250 mg by mouth every 12 (twelve) hours , Disp: , Rfl:     ferrous sulfate 325 (65 Fe) mg tablet, Take 1 tablet (325 mg total) by mouth 2 (two) times a day, Disp: 30 tablet, Rfl: 0    furosemide (LASIX) 20 mg tablet, Take 1 tablet (20 mg total) by mouth daily, Disp: , Rfl: 0    Glucagon, rDNA, (Glucagon Emergency) 1 MG KIT, Inject as directed, Disp: , Rfl:     levothyroxine 100 mcg tablet, Take 1 tablet (100 mcg total) by mouth daily, Disp: , Rfl: 0    LORazepam (ATIVAN) 0 5 mg tablet, Take 1 tablet (0 5 mg total) by mouth daily at bedtime, Disp: 3 tablet, Rfl: 0    LORazepam (ATIVAN) 1 mg tablet, Take 1 tablet 1 hour prior to cystoscopy and additionally tablets as needed prior to procedure  Max 3   Must have  , Disp: 3 tablet, Rfl: 0    magnesium hydroxide (MILK OF MAGNESIA) 400 mg/5 mL oral suspension, Take by mouth daily as needed for constipation, Disp: , Rfl:     melatonin 3 mg, Take 6 mg by mouth daily at bedtime, Disp: , Rfl:     metoprolol tartrate (LOPRESSOR) 25 mg tablet, Take 1 tablet (25 mg total) by mouth every 12 (twelve) hours, Disp: , Rfl:     Multiple Vitamin (multivitamin) capsule, Take 1 capsule by mouth daily, Disp: , Rfl:     nitroglycerin (NITROSTAT) 0 4 mg SL tablet, Place 1 tablet (0 4 mg total) under the tongue every 5 (five) minutes as needed for chest pain, Disp: 90 tablet, Rfl: 0    nystatin-triamcinolone (MYCOLOG-II) cream, , Disp: , Rfl:     oxybutynin (DITROPAN) 5 mg tablet, Take 1 tablet (5 mg total) by mouth 3 (three) times a day as needed (as needed), Disp: 30 tablet, Rfl: 1    polyethylene glycol (MIRALAX) 17 g packet, Take 17 g by mouth daily, Disp: , Rfl: 0    senna (SENOKOT) 8 6 MG tablet, Take 1 tablet by mouth daily, Disp: , Rfl:   Allergies   Allergen Reactions    Dye [Iodinated Diagnostic Agents] Other (See Comments)     Pt is not allergic to IV Contrast - but is avoided because of renal status    Spiriva Handihaler [Tiotropium Bromide Monohydrate] Swelling    Ramipril Facial Swelling    Penicillins      unsure         Review of Systems   Constitutional: Negative for activity change, appetite change and fever  HENT: Negative for sneezing and sore throat  Eyes: Negative  Respiratory: Negative for cough and shortness of breath  Cardiovascular: Negative for chest pain, palpitations and leg swelling  Gastrointestinal: Positive for blood in stool  Negative for abdominal pain, diarrhea, nausea and vomiting  Endocrine: Negative  Genitourinary: Negative for dysuria, flank pain, pelvic pain and vaginal bleeding  Musculoskeletal: Negative for arthralgias, back pain and gait problem  Skin: Negative  Allergic/Immunologic: Negative  Neurological: Negative for dizziness, weakness, numbness and headaches  Hematological: Negative  Psychiatric/Behavioral: Positive for confusion             OBJECTIVE:   /78   Pulse 66   Temp (!) 96 5 °F (35 8 °C)   Resp 16   SpO2 96%   Pain Assessment:  1  Karnofsky: 60 - Requires occasional assistance, but is able care for most of personal needs    Physical Exam  Constitutional:       General: She is not in acute distress  Appearance: Normal appearance  HENT:      Nose: No congestion  Mouth/Throat:      Pharynx: Oropharynx is clear  Eyes:      Extraocular Movements: Extraocular movements intact  Pupils: Pupils are equal, round, and reactive to light  Cardiovascular:      Rate and Rhythm: Normal rate and regular rhythm  Heart sounds: Normal heart sounds  Pulmonary:      Effort: Pulmonary effort is normal       Breath sounds: Normal breath sounds  Abdominal:      Palpations: Abdomen is soft  There is no mass  Tenderness: There is no abdominal tenderness  Musculoskeletal:         General: Swelling present  No tenderness  Normal range of motion  Cervical back: Normal range of motion and neck supple  Lymphadenopathy:      Cervical: No cervical adenopathy  Skin:     General: Skin is dry  Findings: No lesion or rash  Neurological:      General: No focal deficit present  Mental Status: She is alert  Mental status is at baseline  She is disoriented  Psychiatric:         Mood and Affect: Mood normal          Behavior: Behavior normal                RESULTS    Lab Results: No results found for this or any previous visit (from the past 672 hour(s))  Imaging Studies:No results found  Assessment/Plan:  No orders of the defined types were placed in this encounter  Linda Eason is a 77y o  year old female with high-grade invasive urothelial carcinoma bladder with near constant bleeding was given palliative course radiation therapy which finished a month ago  She is doing well and there is no evidence today of any blood in the urinary bag  She is being seen every month for catheter change and will have CT scan of chest, abdomen every 3 months although not receiving any systemic treatment      From our perspective, we will discharge patient see her as needed  Hailey Fong MD  3/3/2022,1:55 PM    Portions of the record may have been created with voice recognition software   Occasional wrong word or "sound a like" substitutions may have occurred due to the inherent limitations of voice recognition software   Read the chart carefully and recognize, using context, where substitutions have occurred

## 2022-03-03 NOTE — PROGRESS NOTES
Collette Glad [age] is a 77 y o  female  finished radiation therapy IMRT on 22 given to the bladder and perivesical tissue for high-grade invasive urothelial carcinoma with active intermittent bleeding  Her daughter who has power of  reconsidered radiation therapy to control bleeding  She presents today for one month EOT visit  22 Urology- baca change    Upcomin22 CT C/A/P  22 Dr Gonzalo Gill, Medical Oncology  3/25/22 Urology      Follow up visit     Oncology History Overview Note    finished radiation therapy IMRT on 22 given to the bladder and perivesical tissue for high-grade invasive urothelial carcinoma with active intermittent bleeding  Her daughter who has power of  reconsidered radiation therapy to control bleeding  She presents today for one month EOT visit  22 Urology- baca change    Upcomin22 CT C/A/P  22 Dr Gonzalo Gill, Medical Oncology  3/25/22 Urology     Malignant neoplasm of lateral wall of urinary bladder ([facility] [address] )   2021 Biopsy     Bladder, biopsy:    -  Portion of high-grade urothelial carcinoma      2021 Surgery    A  Urinary Bladder, Bladder tumor anterolateral wall:  - Invasive high-grade urothelial carcinoma  - Tumor invades lamina propria and muscularis propria  B  Urinary Bladder, Bladder tumor deep resection:  - Invasive high-grade urothelial carcinoma  - Tumor invades lamina propria and muscularis propria  2021 Initial Diagnosis    Malignant neoplasm of lateral wall of urinary bladder ([address] )     2022 - 2022 Radiation    Treatments:  Course: C1    Plan ID Energy Fractions Dose per Fraction (cGy) Dose Correction (cGy) Total Dose Delivered (cGy) Elapsed Days   Pelvis_Bladd 10X 20 / 20 200 0 4,000 27      Treatment Dates:  2022 - 2022  Review of Systems:  Review of Systems   Constitutional: Positive for fatigue  HENT: Positive for trouble swallowing (rarely due to dementia)  Eyes: Negative  Respiratory: Negative  Cardiovascular: Positive for leg swelling  Gastrointestinal: Negative  Endocrine: Negative  Genitourinary: Positive for hematuria (x 1 prior to recent cath change on 2/25/22  Resolved after catheter change)  Chronic baca   Musculoskeletal: Positive for gait problem (W/C: Is able to stand with assist of 1)  Skin: Negative  Allergic/Immunologic: Negative  Neurological: Positive for seizures (seizre disorder; last seizure several years ago)  Hematological: Bruises/bleeds easily  Psychiatric/Behavioral: Positive for sleep disturbance  Negative for agitation          Dementia       Clinical Trial: no        Covid Vaccine Status yes + booster    Health Maintenance   Topic Date Due    Medicare Annual Wellness Visit (AWV)  Never done    BMI: Followup Plan  Never done    Osteoporosis Screening  Never done    Colorectal Cancer Screening  Never done    Fall Risk  Never done    Breast Cancer Screening: Mammogram  09/13/2020    COVID-19 Vaccine (3 - Booster for Moderna series) 07/15/2021    Influenza Vaccine (1) 09/01/2021    Depression Screening  07/12/2022    DTaP,Tdap,and Td Vaccines (2 - Td or Tdap) 08/06/2022    BMI: Adult  12/30/2022    Lung Cancer Screening  01/17/2023    Hepatitis C Screening  Completed    Pneumococcal Vaccine: 65+ Years  Completed    HIB Vaccine  Aged Out    Hepatitis B Vaccine  Aged Out    IPV Vaccine  Aged Out    Hepatitis A Vaccine  Aged Out    Meningococcal ACWY Vaccine  Aged Out    HPV Vaccine  Aged Out     Patient Active Problem List   Diagnosis    Elevated troponin    [facility]) [facility]    Chronic diastolic heart failure (HCC)    Paroxysmal atrial flutter (HCC)    Back pain    Dementia ([facility]    Hyperlipidemia    Urinary tract infection associated with catheterization of urinary tract ([facility] )    RSV infection    Ambulatory dysfunction    Hypernatremia    Stage 3 chronic kidney disease    Lower extremity edema    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    JESENIA (obstructive sleep apnea)    Intermittent claudication of right lower extremity due to atherosclerosis ([facility])    Atrial fibrillation ([facility])    Chronic indwelling Davis catheter    Essential hypertension    Macrocytic anemia    Aortoiliac occlusive disease ([facility] )    HCAP (healthcare-associated pneumonia)    Coronary artery disease involving native coronary artery of native heart without angina pectoris    Hypothyroidism    PAD (peripheral artery disease) ([place] [address] )    History of cerebral aneurysm repair    Urinary retention    TAM (acute kidney injury) ([place] [address] )    Hypertensive kidney disease with stage 3 chronic kidney disease (HCC)    Azotemia    Paroxysmal atrial fibrillation (HCC)    Vomiting    Pneumonia due to COVID-19 virus    Hypoxia    COVID-19    Gram-negative bacteremia    Sepsis ([facility])    Gross hematuria    Acute blood loss anemia    Urothelial carcinoma (HCC)    Malignant neoplasm of lateral wall of urinary bladder (HCC)    Obesity, morbid (HCC)    Vitamin D deficiency    Urinary tract infection    Chronic systolic heart failure (HCC)    Acute DVT (deep venous thrombosis) ([facility])    Deep vein thrombosis (DVT) of proximal lower extremity ([facility])    Lung nodule    Hematuria     Past Medical History:   Diagnosis Date    Arthritis     Atrial fibrillation ([facility] )     Brain aneurysm     CAD (coronary artery disease)     Cardiac disease     had st[facility] 12 years ago in [facility]    Chronic kidney disease     CKD (chronic kidney disease) stage 3, GFR 30-59 ml/min ([facility]) ckd    COPD (chronic obstructive pulmonary disease) ([facility])     Coronary artery disease     Dementia ([facility])     Diastolic CHF, chronic (HCC)     Disease of thyroid gland     GERD (gastroesophageal reflux disease)     Hyperlipidemia     Hyperlipidemia     Hypertension     Hypothyroidism     Migraine  PAD (peripheral artery disease) (HCC)     Psychiatric disorder     Renal disorder     Seizures ([facility]     Stroke ([facility] )     TIA (transient ischemic attack)      Past Surgical History:   Procedure Laterality Date    APPENDECTOMY      ARTERIOGRAM N/A 2018    Procedure: ARTERIOGRAM WITH STENT PLACEMENT;  Surgeon: Delpha Romberg Doctor, MD;  [facility];  Service: Vascular    BLADDER TUMOR EXCISION     [address]  clip right frontal lobe    CARDIAC SURGERY      COLONOSCOPY      CORONARY STENT PLACEMENT      5 stents    EYE SURGERY      IR AORTAGRAM WITH RUN-OFF  2018    IR IVC FILTER PLACEMENT OPTIONAL/TEMPORARY  2021    MANDIBLE FRACTURE SURGERY      CA CYSTOURETHROSCOPY W/IRRIG & EVAC CLOTS N/A 2021    Procedure: CYSTOSCOPY EVACUATION OF CLOTS;  Surgeon: Martha Morton MD;  [facility];  Service: Urology    TONSILLECTOMY      TRANSURETHRAL RESECTION OF BLADDER TUMOR N/A 2021    Procedure: TRANSURETHRAL RESECTION OF BLADDER TUMOR (TURBT);   Surgeon: Martha Morton MD;  [facility];  Service: Urology    TUBAL LIGATION      UTERINE FIBROID SURGERY       Family History   Problem Relation Age of Onset    Heart disease Father     Parkinsonism Father     Macular degeneration Mother     Glaucoma Mother     Diabetes Brother     Heart disease Brother      Social History     Socioeconomic History    Marital status:      Spouse name: Not on file    Number of children: 3    Years of education: Not on file    Highest education level: Not on file   Occupational History    Occupation: retired   Tobacco Use    Smoking status: Former Smoker     Packs/day: 5 00     Years: 40 00     Pack years: 200 00     Quit date: 2007     Years since quittin 4    Smokeless tobacco: Never Used   Vaping Use    Vaping Use: Never used   Substance and Sexual Activity    Alcohol use: Not Currently     Alcohol/week: 0 0 standard drinks  Drug use: No    Sexual activity: Not Currently   Other Topics Concern    Not on file   Social History Narrative    Not on file     Social Determinants of Health     Financial Resource Strain: Not on file   Food Insecurity: Not on file   Transportation Needs: Not on file   Physical Activity: Not on file   Stress: Not on file   Social Connections: Not on file   Intimate Partner Violence: Not on file   Housing Stability: Not on file       Current Outpatient Medications:     acetaminophen (TYLENOL) 325 mg tablet, Take 650 mg by mouth every 6 (six) hours as needed for mild pain, Disp: , Rfl:     albuterol (PROVENTIL HFA,VENTOLIN HFA) 90 mcg/act inhaler, Inhale 2 puffs every 6 (six) hours as needed for wheezing, Disp: 1 Inhaler, Rfl: 0    bisacodyl (FLEET) 10 MG/30ML ENEM, Insert 10 mg into the rectum as needed for constipation, Disp: , Rfl:     buPROPion (WELLBUTRIN SR) 150 mg 12 hr tablet, Take 1 tablet (150 mg total) by mouth daily, Disp: 30 tablet, Rfl: 0    carBAMazepine (TEGretol) 100 mg chewable tablet, Chew 1 tablet (100 mg total) 3 (three) times a day, Disp: 90 tablet, Rfl: 0    Cholecalciferol (VITAMIN D) 2000 units CAPS, Take 1 capsule (2,000 Units total) by mouth daily, Disp: 14 capsule, Rfl: 0    digoxin (LANOXIN) 0 125 mg tablet, Take 125 mcg by mouth daily, Disp: , Rfl:     divalproex sodium (DEPAKOTE) 250 mg EC tablet, Take 250 mg by mouth every 12 (twelve) hours , Disp: , Rfl:     ferrous sulfate 325 (65 Fe) mg tablet, Take 1 tablet (325 mg total) by mouth 2 (two) times a day, Disp: 30 tablet, Rfl: 0    furosemide (LASIX) 20 mg tablet, Take 1 tablet (20 mg total) by mouth daily, Disp: , Rfl: 0    Glucagon, rDNA, (Glucagon Emergency) 1 MG KIT, Inject as directed, Disp: , Rfl:     levothyroxine 100 mcg tablet, Take 1 tablet (100 mcg total) by mouth daily, Disp: , Rfl: 0    LORazepam (ATIVAN) 0 5 mg tablet, Take 1 tablet (0 5 mg total) by mouth daily at bedtime, Disp: 3 tablet, Rfl: 0   LORazepam (ATIVAN) 1 mg tablet, Take 1 tablet 1 hour prior to cystoscopy and additionally tablets as needed prior to procedure  Max 3   Must have  , Disp: 3 tablet, Rfl: 0    magnesium hydroxide (MILK OF MAGNESIA) 400 mg/5 mL oral suspension, Take by mouth daily as needed for constipation, Disp: , Rfl:     melatonin 3 mg, Take 6 mg by mouth daily at bedtime, Disp: , Rfl:     metoprolol tartrate (LOPRESSOR) 25 mg tablet, Take 1 tablet (25 mg total) by mouth every 12 (twelve) hours, Disp: , Rfl:     Multiple Vitamin (multivitamin) capsule, Take 1 capsule by mouth daily, Disp: , Rfl:     nitroglycerin (NITROSTAT) 0 4 mg SL tablet, Place 1 tablet (0 4 mg total) under the tongue every 5 (five) minutes as needed for chest pain, Disp: 90 tablet, Rfl: 0    nystatin-triamcinolone (MYCOLOG-II) cream, , Disp: , Rfl:     oxybutynin (DITROPAN) 5 mg tablet, Take 1 tablet (5 mg total) by mouth 3 (three) times a day as needed (as needed), Disp: 30 tablet, Rfl: 1    polyethylene glycol (MIRALAX) 17 g packet, Take 17 g by mouth daily, Disp: , Rfl: 0    senna (SENOKOT) 8 6 MG tablet, Take 1 tablet by mouth daily, Disp: , Rfl:   Allergies   Allergen Reactions    Dye [Iodinated Diagnostic Agents] Other (See Comments)     Pt is not allergic to IV Contrast - but is avoided because of renal status    Spiriva Handihaler [Tiotropium Bromide Monohydrate] Swelling    Ramipril Facial Swelling    Penicillins      unsure     Vitals:    03/03/22 1100   BP: 150/78   Pulse: 66   Resp: 16   Temp: (!) 96 5 °F (35 8 °C)   SpO2: 96%

## 2022-03-24 NOTE — PROGRESS NOTES
3/25/2022      Chief Complaint   Patient presents with    Urinary Retention     Baca Catheter Change      Assessment and Plan    1  Muscle invasive bladder cancer  2  Intermittent gross hematuria  3  Chronic indwelling baca catheter  4  Advanced Alzheimer's dementia  5  Solitary kidney with CKD  - S/p palliative radiation therapy completed on 2/1/22  - Continue follow up with oncology and surveillance imaging as directed  - Baca catheter exchanged today  Continue 1 mg of Ativan prior to Baca catheter exchanges  Continue oxybutynin as needed for bladder spasms, rx refilled  Not candidate for SPT  - F/u in 1 month with RN for next catheter exchange  History of Present Illness  Jet Angulo is a 77 y o  female here for follow up evaluation for Baca catheter exchange  Patient has stage II muscle invasive bladder cancer  She recently completed palliative radiation therapy on 02/01/2022  She has intermittent gross hematuria just before and following Baca catheter exchanges  The patient has significant medical comorbidities including advanced Alzheimer's dementia  The family has declined any systemic treatment for this condition  Most recent CT from January 2022 showed no signs of metastatic disease or local disease progression  She continues to do well with Baca catheter exchanges and daughter reports oxybutynin has been effective in minimizing bladder spasms        Review of Systems   Unable to perform ROS: Dementia       Past Medical History  Past Medical History:   Diagnosis Date    Arthritis     Atrial fibrillation ([address] )     Brain aneurysm     CAD (coronary artery disease)     Cardiac disease     had st[facility] in [facility]    Chronic kidney disease     CKD (chronic kidney disease) stage 3, GFR 30-59 ml/min ([facility]) ckd    COPD (chronic obstructive pulmonary disease) ([facility])     Coronary artery disease     Dementia ([facility])     Diastolic CHF, chronic ([address] )     Disease of thyroid gland     GERD (gastroesophageal reflux disease)     Hyperlipidemia     Hyperlipidemia     Hypertension     Hypothyroidism     Migraine     PAD (peripheral artery disease) (HCC)     Psychiatric disorder     Renal disorder     [facility]     Stroke [facility]     TIA (transient ischemic attack)        Past Social History  Past Surgical History:   Procedure Laterality Date    APPENDECTOMY      ARTERIOGRAM N/A 2018    Procedure: ARTERIOGRAM WITH STENT PLACEMENT;  Surgeon: Carey Reddy MD;  [facility];  Service: Vascular    BLADDER TUMOR EXCISION     [address]  clip right frontal lobe    CARDIAC SURGERY      COLONOSCOPY      CORONARY STENT PLACEMENT      5 stents    EYE SURGERY      IR AORTAGRAM WITH RUN-OFF  2018    IR IVC FILTER PLACEMENT OPTIONAL/TEMPORARY  2021    MANDIBLE FRACTURE SURGERY      OH CYSTOURETHROSCOPY W/IRRIG & EVAC CLOTS N/A 2021    Procedure: CYSTOSCOPY EVACUATION OF CLOTS;  Surgeon: Rl Byrnes MD;  [facility];  Service: Urology    TONSILLECTOMY      TRANSURETHRAL RESECTION OF BLADDER TUMOR N/A 2021    Procedure: TRANSURETHRAL RESECTION OF BLADDER TUMOR (TURBT);   Surgeon: Rl Byrnes MD;  [facility];  Service: Urology    TUBAL LIGATION      UTERINE FIBROID SURGERY       Social History     Tobacco Use   Smoking Status Former Smoker    Packs/day: 5 00    Years: 40 00    Pack years: 200 00    Quit date: 2007    Years since quittin 5   Smokeless Tobacco Never Used       Past Family History  Family History   Problem Relation Age of Onset    Heart disease Father     Parkinsonism Father     Macular degeneration Mother     Glaucoma Mother     Diabetes Brother     Heart disease Brother        Past Social history  Social History     Socioeconomic History    Marital status:      Spouse name: Not on file    Number of children: 4    Years of education: Not on file    Highest education level: Not on file   Occupational History    Occupation: retired   Tobacco Use    Smoking status: Former Smoker     Packs/day: 5 00     Years: 40 00     Pack years: 200 00     Quit date: 2007     Years since quittin 5    Smokeless tobacco: Never Used   Vaping Use    Vaping Use: Never used   Substance and Sexual Activity    Alcohol use: Not Currently     Alcohol/week: 0 0 standard drinks    Drug use: No    Sexual activity: Not Currently   Other Topics Concern    Not on file   Social History Narrative    Not on file     Social Determinants of Health     Financial Resource Strain: Not on file   Food Insecurity: Not on file   Transportation Needs: Not on file   Physical Activity: Not on file   Stress: Not on file   Social Connections: Not on file   Intimate Partner Violence: Not on file   Housing Stability: Not on file       Current Medications  Current Outpatient Medications   Medication Sig Dispense Refill    acetaminophen (TYLENOL) 325 mg tablet Take 650 mg by mouth every 6 (six) hours as needed for mild pain      albuterol (PROVENTIL HFA,VENTOLIN HFA) 90 mcg/act inhaler Inhale 2 puffs every 6 (six) hours as needed for wheezing 1 Inhaler 0    bisacodyl (FLEET) 10 MG/30ML ENEM Insert 10 mg into the rectum as needed for constipation      buPROPion (WELLBUTRIN SR) 150 mg 12 hr tablet Take 1 tablet (150 mg total) by mouth daily 30 tablet 0    carBAMazepine (TEGretol) 100 mg chewable tablet Chew 1 tablet (100 mg total) 3 (three) times a day 90 tablet 0    Cholecalciferol (VITAMIN D) 2000 units CAPS Take 1 capsule (2,000 Units total) by mouth daily 14 capsule 0    digoxin (LANOXIN) 0 125 mg tablet Take 125 mcg by mouth daily      divalproex sodium (DEPAKOTE) 250 mg EC tablet Take 250 mg by mouth every 12 (twelve) hours       Eliquis 2 5 MG       ferrous sulfate 325 (65 Fe) mg tablet Take 1 tablet (325 mg total) by mouth 2 (two) times a day 30 tablet 0    furosemide (LASIX) 20 mg tablet Take 1 tablet (20 mg total) by mouth daily  0    Glucagon, rDNA, (Glucagon Emergency) 1 MG KIT Inject as directed      levothyroxine 100 mcg tablet Take 1 tablet (100 mcg total) by mouth daily  0    LORazepam (ATIVAN) 0 5 mg tablet Take 1 tablet (0 5 mg total) by mouth daily at bedtime 3 tablet 0    LORazepam (ATIVAN) 1 mg tablet Take 1 tablet 1 hour prior to cystoscopy and additionally tablets as needed prior to procedure  Max 3  Must have   3 tablet 0    magnesium hydroxide (MILK OF MAGNESIA) 400 mg/5 mL oral suspension Take by mouth daily as needed for constipation      melatonin 3 mg Take 6 mg by mouth daily at bedtime      metoprolol tartrate (LOPRESSOR) 25 mg tablet Take 1 tablet (25 mg total) by mouth every 12 (twelve) hours      Multiple Vitamin (multivitamin) capsule Take 1 capsule by mouth daily      nitroglycerin (NITROSTAT) 0 4 mg SL tablet Place 1 tablet (0 4 mg total) under the tongue every 5 (five) minutes as needed for chest pain 90 tablet 0    nystatin-triamcinolone (MYCOLOG-II) cream       oxybutynin (DITROPAN) 5 mg tablet Take 1 tablet (5 mg total) by mouth 3 (three) times a day as needed (bladder spasms) 90 tablet 3    polyethylene glycol (MIRALAX) 17 g packet Take 17 g by mouth daily  0    senna (SENOKOT) 8 6 MG tablet Take 1 tablet by mouth daily       No current facility-administered medications for this visit         Allergies  Allergies   Allergen Reactions    Dye [Iodinated Diagnostic Agents] Other (See Comments)     Pt is not allergic to IV Contrast - but is avoided because of renal status    Spiriva Handihaler [Tiotropium Bromide Monohydrate] Swelling    Ramipril Facial Swelling    Penicillins      unsure         The following portions of the patient's history were reviewed and updated as appropriate: allergies, current medications, past medical history, past social history, past surgical history and problem list       Vitals  Vitals:    03/25/22 1045   BP: 126/76   Pulse: 70   SpO2: 94%           Physical Exam  Physical Exam  Constitutional:       Appearance: Normal appearance  Comments: Accompanied by daughter   Isaiah Montano:      Head: Normocephalic and atraumatic  Right Ear: External ear normal       Left Ear: External ear normal    Eyes:      General: No scleral icterus  Conjunctiva/sclera: Conjunctivae normal    Cardiovascular:      Pulses: Normal pulses  Pulmonary:      Effort: Pulmonary effort is normal    Abdominal:      General: Abdomen is flat  Palpations: Abdomen is soft  Genitourinary:     Comments: Davis catheter patent for clear yellow urine  Musculoskeletal:      Cervical back: Normal range of motion  Skin:     General: Skin is warm and dry  Neurological:      Mental Status: She is alert  Mental status is at baseline  Psychiatric:         Mood and Affect: Mood normal          Behavior: Behavior normal              Results  No results found for this or any previous visit (from the past 1 hour(s))  ]  No results found for: PSA  Lab Results   Component Value Date    CALCIUM 8 5 08/09/2021    K 4 5 08/09/2021    CO2 30 08/09/2021     (H) 08/09/2021    BUN 16 08/09/2021    CREATININE 1 29 08/09/2021     Lab Results   Component Value Date    WBC 5 50 08/09/2021    HGB 9 1 (L) 08/09/2021    HCT 29 4 (L) 08/09/2021     (H) 08/09/2021     (L) 08/09/2021     Universal Protocol:  Consent: Verbal consent obtained  Risks and benefits: risks, benefits and alternatives were discussed  Consent given by: power of   Time out: Immediately prior to procedure a "time out" was called to verify the correct patient, procedure, equipment, support staff and site/side marked as required    Timeout called at: 3/25/2022 11:08 AM   Patient identity confirmed: provided demographic data      Bladder catheterization    Date/Time: 3/25/2022 11:08 AM  Performed by: Lowell Carroll PA-C  Authorized by: Lowell Carroll PA-C Consent:     Consent given by:  Power of   Universal protocol:     Immediately prior to procedure a time out was called: yes      Patient identity confirmed:  Provided demographic data  Pre-procedure details:     Procedure purpose:  Therapeutic    Preparation: Patient was prepped and draped in usual sterile fashion    Anesthesia (see MAR for exact dosages): Anesthesia method:  None  Procedure details:     Bladder irrigation: yes      Catheter insertion:  Indwelling    Approach: natural orifice      Catheter type:  Latex and Davis    Catheter size:  18 Fr    Number of attempts:  1    Successful placement: yes      Urine characteristics:  Clear and yellow  Post-procedure details:     Patient tolerance of procedure: Tolerated well, no immediate complications          Orders  No orders of the defined types were placed in this encounter        Martir Bradley PA-C

## 2022-03-25 ENCOUNTER — OFFICE VISIT (OUTPATIENT)
Dept: UROLOGY | Facility: CLINIC | Age: 77
End: 2022-03-25
Payer: COMMERCIAL

## 2022-03-25 VITALS — DIASTOLIC BLOOD PRESSURE: 76 MMHG | SYSTOLIC BLOOD PRESSURE: 126 MMHG | OXYGEN SATURATION: 94 % | HEART RATE: 70 BPM

## 2022-03-25 DIAGNOSIS — N32.89 BLADDER SPASM: ICD-10-CM

## 2022-03-25 DIAGNOSIS — R33.9 URINARY RETENTION: ICD-10-CM

## 2022-03-25 PROCEDURE — 51702 INSERT TEMP BLADDER CATH: CPT | Performed by: PHYSICIAN ASSISTANT

## 2022-03-25 PROCEDURE — 99213 OFFICE O/P EST LOW 20 MIN: CPT | Performed by: PHYSICIAN ASSISTANT

## 2022-03-25 RX ORDER — OXYBUTYNIN CHLORIDE 5 MG/1
5 TABLET ORAL 3 TIMES DAILY PRN
Qty: 90 TABLET | Refills: 3 | Status: SHIPPED | OUTPATIENT
Start: 2022-03-25

## 2022-03-25 RX ORDER — APIXABAN 2.5 MG/1
TABLET, FILM COATED ORAL
COMMUNITY
Start: 2022-03-14

## 2022-04-06 ENCOUNTER — TELEPHONE (OUTPATIENT)
Dept: NEPHROLOGY | Facility: CLINIC | Age: 77
End: 2022-04-06

## 2022-04-06 NOTE — TELEPHONE ENCOUNTER
I called and spoke to the patient daughter Vaishali Brown and schedule the patient follow up appointment with Dr Ceci Joiner from the recall list  The patient daughter understood and was okay with the day and time of the patient next appointment   Temitope Peterson,

## 2022-04-18 ENCOUNTER — TELEPHONE (OUTPATIENT)
Dept: UROLOGY | Facility: AMBULATORY SURGERY CENTER | Age: 77
End: 2022-04-18

## 2022-04-18 NOTE — TELEPHONE ENCOUNTER
Pt under care of Buddy Cabezas    Daughter called an stated pt was in [facility] ED over the weekend and they did a cath change and urine culture due to pt either pulled out cath or fell out    Pt daughter Tony Ocampo stated that pt has appt this Friday but not sure if it should be kept due to it just being changed this weekend      Pt call back-Shirley(daughter) [phone] or [phone]

## 2022-04-29 ENCOUNTER — HOSPITAL ENCOUNTER (OUTPATIENT)
Dept: CT IMAGING | Facility: HOSPITAL | Age: 77
Discharge: HOME | End: 2022-04-29
Attending: INTERNAL MEDICINE
Payer: COMMERCIAL

## 2022-04-29 DIAGNOSIS — C68.9 UROTHELIAL CARCINOMA (HCC): ICD-10-CM

## 2022-04-29 PROCEDURE — 71250 CT THORAX DX C-: CPT

## 2022-04-29 PROCEDURE — 74176 CT ABD & PELVIS W/O CONTRAST: CPT

## 2022-04-29 PROCEDURE — G1004 CDSM NDSC: HCPCS

## 2022-05-02 ENCOUNTER — TELEPHONE (OUTPATIENT)
Dept: NEPHROLOGY | Facility: CLINIC | Age: 77
End: 2022-05-02

## 2022-05-03 ENCOUNTER — TELEPHONE (OUTPATIENT)
Dept: NEPHROLOGY | Facility: CLINIC | Age: 77
End: 2022-05-03

## 2022-05-03 ENCOUNTER — TELEPHONE (OUTPATIENT)
Dept: HEMATOLOGY ONCOLOGY | Facility: CLINIC | Age: 77
End: 2022-05-03

## 2022-05-03 NOTE — TELEPHONE ENCOUNTER
Pt's daughter called and Lm asking to call her back  Kam Ashford and was asked to fax over her mom's blood work order to [facility] at [phone] so it can be done prior to her next appt  Labs sent

## 2022-05-04 ENCOUNTER — OFFICE VISIT (OUTPATIENT)
Dept: HEMATOLOGY ONCOLOGY | Facility: CLINIC | Age: 77
End: 2022-05-04
Payer: COMMERCIAL

## 2022-05-04 ENCOUNTER — TELEPHONE (OUTPATIENT)
Dept: HEMATOLOGY ONCOLOGY | Facility: CLINIC | Age: 77
End: 2022-05-04

## 2022-05-04 VITALS
RESPIRATION RATE: 18 BRPM | SYSTOLIC BLOOD PRESSURE: 124 MMHG | DIASTOLIC BLOOD PRESSURE: 68 MMHG | TEMPERATURE: 97.9 F | HEART RATE: 83 BPM | OXYGEN SATURATION: 91 %

## 2022-05-04 DIAGNOSIS — C68.9 UROTHELIAL CARCINOMA (HCC): Primary | ICD-10-CM

## 2022-05-04 DIAGNOSIS — F02.80 ALZHEIMER'S DEMENTIA WITHOUT BEHAVIORAL DISTURBANCE, UNSPECIFIED TIMING OF DEMENTIA ONSET (HCC): ICD-10-CM

## 2022-05-04 DIAGNOSIS — G30.9 ALZHEIMER'S DEMENTIA WITHOUT BEHAVIORAL DISTURBANCE, UNSPECIFIED TIMING OF DEMENTIA ONSET (HCC): ICD-10-CM

## 2022-05-04 DIAGNOSIS — Z71.89 GOALS OF CARE, COUNSELING/DISCUSSION: ICD-10-CM

## 2022-05-04 PROCEDURE — 99215 OFFICE O/P EST HI 40 MIN: CPT | Performed by: INTERNAL MEDICINE

## 2022-05-04 NOTE — PROGRESS NOTES
[facility] - Hematology & Medical Oncology  Outpatient Visit Encounter Note      Margie Funes 77 y o  female [id number] Date:  5/4/2022    HISTORY      Final Diagnosis   A  Urinary Bladder, Bladder tumor anterolateral wall:  - Invasive high-grade urothelial carcinoma  - Tumor invades lamina propria and muscularis propria       B  Urinary Bladder, Bladder tumor deep resection:  - Invasive high-grade urothelial carcinoma  - Tumor invades lamina propria and muscularis propria  Electronically signed by Diana Carroll MD on 6/25/2021 at  3:14 PM         Elissa Mora is a 77 y o  transferring their care from Dr Nika Fairbanks   The patient was last seen on 1/28/22 as an office visit  Patient was being managed for urothelial cancer and DVT  In review of the chart and talking with the family, patient has Alzheim'ers dementia  She was in the hospital in summer of 2021 with hematuria  She was seen by Urology and masses in the bladder were identified  They resulted as biopsy proven muscle-invasive bladder cancer  As listed, an 8cm tumor was resected  A baseline imaging study showed several small stable lung nodules with no M1 disease  She finished radiation therapy IMRT on 2/1/22 given to the bladder and perivesical tissue for high-grade invasive urothelial carcinoma with active intermittent bleeding  Her most recent re-staging scan from 4/29/22 shows developing mediastinal and RP adenopathy  Here with her daughter  In wheelchair  Feels okay  Has episodes of back pain  No f/c/n/v  Remains wheelchair bound  Has memory issues  I have reviewed the relevant past medical, surgical, social and family history  I have also reviewed allergies and medications for this patient      Review of Systems  Review of Systems   Unable to perform ROS: dementia         OBJECTIVE     Physical Exam  Vitals:    05/04/22 1426   BP: 124/68   BP Location: Left arm Patient Position: Sitting   Cuff Size: Adult   Pulse: 83   Resp: 18   Temp: 97 9 °F (36 6 °C)   SpO2: 91%       Physical Exam  Vitals reviewed  Constitutional:       General: She is not in acute distress  Appearance: She is not toxic-appearing  Comments: Looks weak   HENT:      Head: Normocephalic and atraumatic  Eyes:      General: No scleral icterus  Extraocular Movements: Extraocular movements intact  Cardiovascular:      Rate and Rhythm: Normal rate  Pulmonary:      Effort: Pulmonary effort is normal  No respiratory distress  Abdominal:      General: There is no distension  Musculoskeletal:         General: Normal range of motion  Cervical back: Normal range of motion and neck supple  Neurological:      Mental Status: She is alert  Imaging  Relevant imaging reviewed in chart    Labs  Relevant labs reviewed in chart   ASSESSMENT & PLAN      Diagnosis ICD-10-CM Associated Orders   1  Urothelial carcinoma ([facility] )  C68 9 Ambulatory Referral to Hospice   2  Goals of care, counseling/discussion  Z71 89    3  Alzheimer's dementia without behavioral disturbance, unspecified timing of dementia onset ([facility] )  G30 9     F36 80          77 y o  female with Alzheim'ers dementia here with her daughter, who is listed as POA in notes  As detailed above and elsewhere, diagnosed with biopsy proven muscle-invasive bladder cancer in summer 2021  As listed, an 8cm tumor was resected  A baseline imaging study showed several small stable lung nodules with no M1 disease  She finished radiation therapy IMRT on 2/1/22 given to the bladder and perivesical tissue for high-grade invasive urothelial carcinoma with active intermittent bleeding  Her most recent re-staging scan from 4/29/22 shows developing mediastinal and RP adenopathy  Clinically, this is concerning for M1 disease or at least early signs of it  Given ECOG 4 and discussion for goals of care, family decision of POA is for hospice   I provided emotional and social support and placed the order  All questions were answered to the patient's satisfaction during this encounter  They appreciated and thanked me for spending time with them  The patient knows the contact information for our office and know to reach out for any relevant concerns related to this encounter  For all other listed problems and medical diagnosis in his chart - they are managed by PCP and/or other specialists, which patient acknowledges  Dr Anival Montero MD  Hematology & Medical Oncology

## 2022-05-04 NOTE — TELEPHONE ENCOUNTER
Received a call from Radiology regarding Significant findings:    IMPRESSION:     1  Developing mediastinal and retroperitoneal adenopathy  2   Right lower lobe airspace abnormality with adjacent nodular foci  6 mm right upper lobe irregular nodule  Possibly infectious or inflammatory in nature  Clinical correlation for pneumonia  Consider 1 month follow-up CT scan  3   Other findings stable  Patient has an appt with Dr Jaime Messer today

## 2022-05-05 ENCOUNTER — HOME CARE VISIT (OUTPATIENT)
Dept: HOME HEALTH SERVICES | Facility: HOME HEALTHCARE | Age: 77
End: 2022-05-05

## 2022-05-05 NOTE — CASE COMMUNICATION
Good morning  I have a pt for [facility]  [facility] [facility]  [employer] [employer] pt 77 y o  female with Alzheimers dementia diagnosed with biopsy proven perivesical tissue for high-grade invasive urothelial carcinoma with active intermittent bleeding  in summer 2021  An 8cm tumor was resected  A baseline imaging study showed several small stable lung nodules with no M1 disease  She finished radiation therapy IMRT on 2/1/22  Her most recent re-staging sc an from 4/29/22 shows developing mediastinal and RP adenopathy concerning for M1 disease  Comorbities include Afib, CAD, combined systolic and dialostic CHF, PAD  Pt is wheelchair bound, weak and confused   Given ECOG 4 family decision is for hospice

## 2022-05-09 ENCOUNTER — HOME CARE VISIT (OUTPATIENT)
Dept: HOME HOSPICE | Facility: HOSPICE | Age: 77
End: 2022-05-09

## 2022-05-09 ENCOUNTER — TELEPHONE (OUTPATIENT)
Dept: NEPHROLOGY | Facility: CLINIC | Age: 77
End: 2022-05-09

## 2022-05-09 NOTE — TELEPHONE ENCOUNTER
Regina mendoza [facility] called and confirmed appt scheduled for 05/10 at 3:00   Labs faxed at [phone]

## 2022-05-10 ENCOUNTER — OFFICE VISIT (OUTPATIENT)
Dept: NEPHROLOGY | Facility: CLINIC | Age: 77
End: 2022-05-10
Payer: COMMERCIAL

## 2022-05-10 VITALS
DIASTOLIC BLOOD PRESSURE: 70 MMHG | TEMPERATURE: 97.8 F | BODY MASS INDEX: 31.45 KG/M2 | OXYGEN SATURATION: 92 % | HEART RATE: 55 BPM | SYSTOLIC BLOOD PRESSURE: 146 MMHG | RESPIRATION RATE: 16 BRPM | HEIGHT: 61 IN | WEIGHT: 166.6 LBS

## 2022-05-10 DIAGNOSIS — I12.9 HYPERTENSIVE KIDNEY DISEASE WITH STAGE 3 CHRONIC KIDNEY DISEASE, UNSPECIFIED WHETHER STAGE 3A OR 3B CKD (HCC): ICD-10-CM

## 2022-05-10 DIAGNOSIS — N18.30 STAGE 3 CHRONIC KIDNEY DISEASE, UNSPECIFIED WHETHER STAGE 3A OR 3B CKD (HCC): Primary | Chronic | ICD-10-CM

## 2022-05-10 DIAGNOSIS — N18.30 HYPERTENSIVE KIDNEY DISEASE WITH STAGE 3 CHRONIC KIDNEY DISEASE, UNSPECIFIED WHETHER STAGE 3A OR 3B CKD (HCC): ICD-10-CM

## 2022-05-10 DIAGNOSIS — E55.9 VITAMIN D DEFICIENCY: ICD-10-CM

## 2022-05-10 PROCEDURE — 99214 OFFICE O/P EST MOD 30 MIN: CPT | Performed by: INTERNAL MEDICINE

## 2022-05-10 NOTE — PROGRESS NOTES
NEPHROLOGY OFFICE FOLLOW UP  Lincoln Champagne 77 y o  female MRN: [id number]    Encounter: [id number] DATE: 5/10/2022    REASON FOR VISIT: Lincoln Champagne is a 77 y o  female who is here on 5/10/2022 for further management of chronic kidney disease  HPI:    This is 78-year-old female with past medical history significant for neurogenic bladder, chronic indwelling Davis catheter, bladder cancer, COPD, paroxysmal AFib, seizure disorder, returns to Nephrology office for further management of chronic kidney disease stage 3  Upon review of old medical records, previously known baseline creatinine was thought to be around 1 3   Patient's daughter was also present at the time of consultation and history was also obtained from her and all the questions were answered   According to patient's daughter, patient currently resides at [facility] and mostly using wheelchair  Patient also have underlying urinary bladder cancer- cT2 stage and currently also been followed by urology team and had underwent trans urethral resection of bladder tumor (6/23/21)  I have reviewed recent oncologist office note with the recommendation and during surveillance, patient was also found to have metastasis suggesting may have M 1 classification  Family wanted to pursue hospice  Patient has underlying hypertension which currently seems to be under good control with current antihypertensive medications  Patient also have vitamin- D deficiency and currently taking cholecalciferol  Patient also have paroxysmal AFib  Currently not taking Eliquis due to gross hematuria  Patient is on beta-blocker along with digoxin  Patient also have underlying seizure disorder and currently taking Depakote and Tegretol   Patient denies use of NSAIDs   REVIEW OF SYSTEMS:    Review of Systems   Constitutional: Negative for chills and fever  HENT: Negative for nosebleeds and sore throat  Eyes: Negative for photophobia and pain  Respiratory: Negative for choking and wheezing  Cardiovascular: Negative for chest pain and palpitations  Gastrointestinal: Negative for blood in stool  Genitourinary: Negative for hematuria  Musculoskeletal: Negative for neck stiffness  Skin: Negative for pallor  Neurological: Positive for headaches  Negative for seizures and syncope  Psychiatric/Behavioral: Negative for confusion and suicidal ideas           PAST MEDICAL HISTORY:  Past Medical History:   Diagnosis Date    Arthritis     Atrial fibrillation ([facility] [address] )     Brain aneurysm     CAD (coronary artery disease)     Cardiac disease     had stents 12 years ago in [facility]    Chronic kidney disease     CKD (chronic kidney disease) stage 3, GFR 30-59 ml/min ([facility]) ckd    COPD (chronic obstructive pulmonary disease) ([facility])     Coronary artery disease     Dementia ([facility])     Diastolic CHF, chronic ([facility])     Disease of thyroid gland     GERD (gastroesophageal reflux disease)     Hyperlipidemia     Hyperlipidemia     Hypertension     Hypothyroidism     Migraine     PAD (peripheral artery disease) ([facility])     Psychiatric disorder     Renal disorder     Seizures ([facility] [address] )     [facility]     TIA (transient ischemic attack)        PAST SURGICAL HISTORY:  Past Surgical History:   Procedure Laterality Date    APPENDECTOMY      ARTERIOGRAM N/A 12/7/2018    Procedure: ARTERIOGRAM WITH STENT PLACEMENT;  Surgeon: Kenroy Reddy MD;  [facility];  Service: Vascular    BLADDER TUMOR EXCISION      BRAIN SURGERY      1998  clip right frontal lobe    CARDIAC SURGERY      COLONOSCOPY      CORONARY STENT PLACEMENT      5 stents    EYE SURGERY      IR AORTAGRAM WITH RUN-OFF  12/7/2018    IR IVC FILTER PLACEMENT OPTIONAL/TEMPORARY  9/2/2021    MANDIBLE FRACTURE SURGERY      MI CYSTOURETHROSCOPY W/IRRIG & EVAC CLOTS N/A 6/19/2021    Procedure: CYSTOSCOPY EVACUATION OF CLOTS;  Surgeon: Jerome Rader MD;  [facility];  Service: Urology    TONSILLECTOMY      TRANSURETHRAL RESECTION OF BLADDER TUMOR N/A 2021    Procedure: TRANSURETHRAL RESECTION OF BLADDER TUMOR (TURBT);   Surgeon: Avril Costello MD;  [facility];  Service: Urology    TUBAL LIGATION      UTERINE FIBROID SURGERY         SOCIAL HISTORY:  Social History     Substance and Sexual Activity   Alcohol Use Not Currently    Alcohol/week: 0 0 standard drinks     Social History     Substance and Sexual Activity   Drug Use No     Social History     Tobacco Use   Smoking Status Former Smoker    Packs/day: 5 00    Years: 40 00    Pack years: 200 00    Quit date: 2007    Years since quittin 6   Smokeless Tobacco Never Used       FAMILY HISTORY:  Family History   Problem Relation Age of Onset    Heart disease Father     Parkinsonism Father     Macular degeneration Mother     Glaucoma Mother     Diabetes Brother     Heart disease Brother        ALLERGY:  Allergies   Allergen Reactions    Dye [Iodinated Diagnostic Agents] Other (See Comments)     Pt is not allergic to IV Contrast - but is avoided because of renal status    Spiriva Handihaler [Tiotropium Bromide Monohydrate] Swelling    Ramipril Facial Swelling    Penicillins      unsure       MEDICATIONS:    Current Outpatient Medications:     acetaminophen (TYLENOL) 325 mg tablet, Take 650 mg by mouth every 6 (six) hours as needed for mild pain, Disp: , Rfl:     albuterol (PROVENTIL HFA,VENTOLIN HFA) 90 mcg/act inhaler, Inhale 2 puffs every 6 (six) hours as needed for wheezing, Disp: 1 Inhaler, Rfl: 0    bisacodyl (FLEET) 10 MG/30ML ENEM, Insert 10 mg into the rectum as needed for constipation, Disp: , Rfl:     buPROPion (WELLBUTRIN SR) 150 mg 12 hr tablet, Take 1 tablet (150 mg total) by mouth daily, Disp: 30 tablet, Rfl: 0    carBAMazepine (TEGretol) 100 mg chewable tablet, Chew 1 tablet (100 mg total) 3 (three) times a day, Disp: 90 tablet, Rfl: 0    Cholecalciferol (VITAMIN D) 2000 units CAPS, Take 1 capsule (2,000 Units total) by mouth daily, Disp: 14 capsule, Rfl: 0    digoxin (LANOXIN) 0 125 mg tablet, Take 125 mcg by mouth daily, Disp: , Rfl:     divalproex sodium (DEPAKOTE) 250 mg EC tablet, Take 250 mg by mouth every 12 (twelve) hours , Disp: , Rfl:     Eliquis 2 5 MG, , Disp: , Rfl:     ferrous sulfate 325 (65 Fe) mg tablet, Take 1 tablet (325 mg total) by mouth 2 (two) times a day, Disp: 30 tablet, Rfl: 0    furosemide (LASIX) 20 mg tablet, Take 1 tablet (20 mg total) by mouth daily, Disp: , Rfl: 0    Glucagon, rDNA, (Glucagon Emergency) 1 MG KIT, Inject as directed, Disp: , Rfl:     levothyroxine 100 mcg tablet, Take 1 tablet (100 mcg total) by mouth daily, Disp: , Rfl: 0    LORazepam (ATIVAN) 0 5 mg tablet, Take 1 tablet (0 5 mg total) by mouth daily at bedtime, Disp: 3 tablet, Rfl: 0    LORazepam (ATIVAN) 1 mg tablet, Take 1 tablet 1 hour prior to cystoscopy and additionally tablets as needed prior to procedure  Max 3   Must have  , Disp: 3 tablet, Rfl: 0    magnesium hydroxide (MILK OF MAGNESIA) 400 mg/5 mL oral suspension, Take by mouth daily as needed for constipation, Disp: , Rfl:     melatonin 3 mg, Take 6 mg by mouth daily at bedtime, Disp: , Rfl:     metoprolol tartrate (LOPRESSOR) 25 mg tablet, Take 1 tablet (25 mg total) by mouth every 12 (twelve) hours, Disp: , Rfl:     Multiple Vitamin (multivitamin) capsule, Take 1 capsule by mouth daily, Disp: , Rfl:     nitroglycerin (NITROSTAT) 0 4 mg SL tablet, Place 1 tablet (0 4 mg total) under the tongue every 5 (five) minutes as needed for chest pain, Disp: 90 tablet, Rfl: 0    nystatin-triamcinolone (MYCOLOG-II) cream, , Disp: , Rfl:     oxybutynin (DITROPAN) 5 mg tablet, Take 1 tablet (5 mg total) by mouth 3 (three) times a day as needed (bladder spasms), Disp: 90 tablet, Rfl: 3    polyethylene glycol (MIRALAX) 17 g packet, Take 17 g by mouth daily, Disp: , Rfl: 0    senna (SENOKOT) 8 6 MG tablet, Take 1 tablet by mouth daily, Disp: , Rfl:     PHYSICAL EXAM:  Vitals:    05/10/22 1443   BP: 146/70   BP Location: Left arm   Patient Position: Sitting   Cuff Size: Standard   Pulse: 55   Resp: 16   Temp: 97 8 °F (36 6 °C)   TempSrc: Temporal   SpO2: 92%   Weight: 75 6 kg (166 lb 9 6 oz)   Height: 5' 1" (1 549 m)     Body mass index is 31 48 kg/m²  Physical Exam  Constitutional:       General: She is not in acute distress  Comments: Uses wheelchair   HENT:      Right Ear: External ear normal    Eyes:      General: No scleral icterus  Conjunctiva/sclera:      Right eye: No hemorrhage  Neck:      Thyroid: No thyromegaly  Vascular: No JVD  Cardiovascular:      Rate and Rhythm: Normal rate  Pulmonary:      Effort: Pulmonary effort is normal  No accessory muscle usage or respiratory distress  Breath sounds: No wheezing  Abdominal:      General: There is no distension  Musculoskeletal:      Right ankle: No swelling  Left ankle: No swelling  Skin:     General: Skin is warm  Coloration: Skin is not jaundiced  Neurological:      Mental Status: She is alert  She is not disoriented  Psychiatric:         Speech: She is communicative  Behavior: Behavior is not combative           LAB RESULTS:  Results for orders placed or performed during the hospital encounter of 08/07/21   CBC and differential   Result Value Ref Range    WBC 5 47 4 31 - 10 16 Thousand/uL    RBC 3 00 (L) 3 81 - 5 12 Million/uL    Hemoglobin 9 7 (L) 11 5 - 15 4 g/dL    Hematocrit 31 5 (L) 34 8 - 46 1 %     (H) 82 - 98 fL    MCH 32 3 26 8 - 34 3 pg    MCHC 30 8 (L) 31 4 - 37 4 g/dL    RDW 14 2 11 6 - 15 1 %    MPV 12 1 8 9 - 12 7 fL    Platelets 890 388 - 445 Thousands/uL    nRBC 0 /100 WBCs    Neutrophils Relative 42 (L) 43 - 75 %    Immat GRANS % 1 0 - 2 %    Lymphocytes Relative 42 14 - 44 %    Monocytes Relative 9 4 - 12 %    Eosinophils Relative 5 0 - 6 %    Basophils Relative 1 0 - 1 % Neutrophils Absolute 2 35 1 85 - 7 62 Thousands/µL    Immature Grans Absolute 0 06 0 00 - 0 20 Thousand/uL    Lymphocytes Absolute 2 30 0 60 - 4 47 Thousands/µL    Monocytes Absolute 0 47 0 17 - 1 22 Thousand/µL    Eosinophils Absolute 0 25 0 00 - 0 61 Thousand/µL    Basophils Absolute 0 04 0 00 - 0 10 Thousands/µL   Basic metabolic panel   Result Value Ref Range    Sodium 144 136 - 145 mmol/L    Potassium 5 1 3 5 - 5 3 mmol/L    Chloride 109 (H) 100 - 108 mmol/L    CO2 28 21 - 32 mmol/L    ANION GAP 7 4 - 13 mmol/L    BUN 18 5 - 25 mg/dL    Creatinine 1 12 0 60 - 1 30 mg/dL    Glucose 113 65 - 140 mg/dL    Calcium 8 8 8 3 - 10 1 mg/dL    eGFR 49 ml/min/1 73sq m   Protime-INR   Result Value Ref Range    Protime 14 6 (H) 11 6 - 14 5 seconds    INR 1 11 0 84 - 1 19   APTT   Result Value Ref Range    PTT 29 23 - 37 seconds   Basic metabolic panel   Result Value Ref Range    Sodium 145 136 - 145 mmol/L    Potassium 4 7 3 5 - 5 3 mmol/L    Chloride 110 (H) 100 - 108 mmol/L    CO2 27 21 - 32 mmol/L    ANION GAP 8 4 - 13 mmol/L    BUN 22 5 - 25 mg/dL    Creatinine 1 22 0 60 - 1 30 mg/dL    Glucose 114 65 - 140 mg/dL    Calcium 8 6 8 3 - 10 1 mg/dL    eGFR 44 ml/min/1 73sq m   CBC (With Platelets)   Result Value Ref Range    WBC 5 25 4 31 - 10 16 Thousand/uL    RBC 2 89 (L) 3 81 - 5 12 Million/uL    Hemoglobin 9 2 (L) 11 5 - 15 4 g/dL    Hematocrit 30 0 (L) 34 8 - 46 1 %     (H) 82 - 98 fL    MCH 31 8 26 8 - 34 3 pg    MCHC 30 7 (L) 31 4 - 37 4 g/dL    RDW 14 5 11 6 - 15 1 %    Platelets 860 248 - 757 Thousands/uL    MPV 11 1 8 9 - 12 7 fL   Magnesium   Result Value Ref Range    Magnesium 2 4 1 6 - 2 6 mg/dL   Phosphorus   Result Value Ref Range    Phosphorus 4 2 (H) 2 3 - 4 1 mg/dL   Basic metabolic panel   Result Value Ref Range    Sodium 147 (H) 136 - 145 mmol/L    Potassium 4 5 3 5 - 5 3 mmol/L    Chloride 111 (H) 100 - 108 mmol/L    CO2 30 21 - 32 mmol/L    ANION GAP 6 4 - 13 mmol/L    BUN 16 5 - 25 mg/dL Creatinine 1 29 0 60 - 1 30 mg/dL    Glucose 98 65 - 140 mg/dL    Calcium 8 5 8 3 - 10 1 mg/dL    eGFR 41 ml/min/1 73sq m   CBC   Result Value Ref Range    WBC 5 50 4 31 - 10 16 Thousand/uL    RBC 2 83 (L) 3 81 - 5 12 Million/uL    Hemoglobin 9 1 (L) 11 5 - 15 4 g/dL    Hematocrit 29 4 (L) 34 8 - 46 1 %     (H) 82 - 98 fL    MCH 32 2 26 8 - 34 3 pg    MCHC 31 0 (L) 31 4 - 37 4 g/dL    RDW 14 4 11 6 - 15 1 %    Platelets 791 (L) 757 - 390 Thousands/uL    MPV 11 4 8 9 - 12 7 fL       ASSESSMENT and PLAN:  Smith Robert was seen today for chronic kidney disease and follow-up  Diagnoses and all orders for this visit:    Stage 3 chronic kidney disease, unspecified whether stage 3a or 3b CKD ([place] [address] )  -     Cancel: CBC and differential; Future  -     Cancel: Basic metabolic panel; Future  -     Cancel: Urinalysis with microscopic; Future  -     Cancel: Microalbumin / creatinine urine ratio; Future  -     Cancel: Phosphorus; Future  -     Cancel: Uric acid; Future  -     Cancel: PTH, intact; Future  -     Cancel: Vitamin D 25 hydroxy; Future    Hypertensive kidney disease with stage 3 chronic kidney disease, unspecified whether stage 3a or 3b CKD ([place] [address] )  -     Cancel: Basic metabolic panel; Future  -     Cancel: Urinalysis with microscopic; Future  -     Cancel: Microalbumin / creatinine urine ratio; Future    Vitamin D deficiency  -     Cancel: Vitamin D 25 hydroxy; Future      1  CKD stage 3   Multifactorial, suspected due to underlying hypertensive 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 )   Upon review of old medical records, previously known baseline creatinine was thought to be around 1 3 and in the interim renal function has remained stable with current creatinine of 1 2 with EGFR of 47  Clinically patient is not in volume overload state and advised patient to increase oral fluid intake to ensure staying well hydrated      Patient has underlying urinary bladder cancer cT2 and now been followed by oncologist with [facility] and recent scan suggested patient have mediastinal lymphadenopathy suspicious for progression of disease to M1 classification  Family has decided to pursue hospice care  Overall plan was discussed in length with patient's daughter in the office today  Agree with pursuing hospice care in current clinical setting  They have appointment with hospice care folks in next few days  2   Hypertension in chronic kidney disease  Under well control and plan to monitor hypertension with metoprolol 25 mg PO BID today  Patient was also encouraged to follow low-salt diet   3  Vitamin- D deficiency   Patient is currently taking cholecalciferol 2000 Units PO daily   Last known vitamin-D level is 45 which is at goal   Plan to continue cholecalciferol at current dose  Returns to Nephrology office PRN      Portions of the record may have been created with voice recognition software  Occasional wrong word or "sound a like" substitutions may have occurred due to the inherent limitations of voice recognition software  Read the chart carefully and recognize, using context, where substitutions have occurred  If you have any questions, please contact the dictating provider

## 2022-05-13 ENCOUNTER — PROCEDURE VISIT (OUTPATIENT)
Dept: UROLOGY | Facility: CLINIC | Age: 77
End: 2022-05-13
Payer: COMMERCIAL

## 2022-05-13 VITALS
SYSTOLIC BLOOD PRESSURE: 142 MMHG | BODY MASS INDEX: 31.48 KG/M2 | HEART RATE: 72 BPM | OXYGEN SATURATION: 93 % | HEIGHT: 61 IN | DIASTOLIC BLOOD PRESSURE: 78 MMHG

## 2022-05-13 DIAGNOSIS — R33.9 URINARY RETENTION: Primary | ICD-10-CM

## 2022-05-13 PROCEDURE — 51702 INSERT TEMP BLADDER CATH: CPT

## 2022-05-13 NOTE — PROGRESS NOTES
5/13/2022  Janet Thrasher is a 77 y o  female  [id number]    Diagnosis:  Chief Complaint     Malignant neoplasm of lateral wall of urinary bladder ([address] ); Urinary Retention          Patient presents for routine catheter change managed by Dr Debi Oh:  -Patient is currently being transferred to hospice care  Patient was given written orders for hospice to change catheter Q4 weeks  Patient instructed to call with any questions or concerns in the meantime  Vitals:    05/13/22 1144   BP: 142/78   Pulse: 72   SpO2: 93%   Height: 5' 1" (1 549 m)           Procedure:    Universal Protocol:  Consent: Verbal consent obtained  Risks and benefits: risks, benefits and alternatives were discussed  Consent given by: patient  Patient understanding: patient states understanding of the procedure being performed  Patient consent: the patient's understanding of the procedure matches consent given  Procedure consent: procedure consent matches procedure scheduled  Patient identity confirmed: verbally with patient      Bladder catheterization    Date/Time: 5/13/2022 4:02 PM  Performed by: Lynnette Thompson  Authorized by: Daina Donnelly MD     Consent:     Consent given by:  Patient  Universal protocol:     Procedure explained and questions answered to patient or proxy's satisfaction: yes      Patient identity confirmed:  Verbally with patient  Pre-procedure details:     Procedure purpose:  Therapeutic    Preparation: Patient was prepped and draped in usual sterile fashion    Anesthesia (see MAR for exact dosages): Anesthesia method:  None  Procedure details:     Bladder irrigation: no      Catheter insertion:  Indwelling    Approach: natural orifice      Catheter type:  Latex and Davis    Catheter size:  20 Fr    Number of attempts:  1    Successful placement: yes      Urine characteristics:  Clear and yellow  Post-procedure details:     Patient tolerance of procedure:   Tolerated well, no immediate complications  Comments:      Old catheter removed after deflation of intact baca balloon  New catheter inserted with 10 mL sterile water inserted into baca balloon  Catheter attached to leg bag               Ramiro Dunn LPN

## 2022-10-12 PROBLEM — N39.0 URINARY TRACT INFECTION ASSOCIATED WITH CATHETERIZATION OF URINARY TRACT (HCC): Status: RESOLVED | Noted: 2018-03-24 | Resolved: 2022-10-12

## 2022-10-12 PROBLEM — A41.9 SEPSIS (HCC): Status: RESOLVED | Noted: 2020-12-30 | Resolved: 2022-01-01

## 2022-10-12 PROBLEM — U07.1 PNEUMONIA DUE TO COVID-19 VIRUS: Status: RESOLVED | Noted: 2020-12-29 | Resolved: 2022-01-01

## 2022-10-12 PROBLEM — T83.511A URINARY TRACT INFECTION ASSOCIATED WITH CATHETERIZATION OF URINARY TRACT (HCC): Status: RESOLVED | Noted: 2018-03-24 | Resolved: 2022-01-01

## 2022-10-12 PROBLEM — J12.82 PNEUMONIA DUE TO COVID-19 VIRUS: Status: RESOLVED | Noted: 2020-12-29 | Resolved: 2022-10-12

## 2022-10-12 PROBLEM — N39.0 URINARY TRACT INFECTION: Status: RESOLVED | Noted: 2021-07-19 | Resolved: 2022-10-12

## 2022-12-08 NOTE — ASSESSMENT & PLAN NOTE
Dr. Morales Fraction - pt called to state it was $240 at [facility] but they off an exact compound of this medication for $70.  Ok to give verbal for [facility] to move forward with the compound? Thank you. Rate controlled  Continue Metoprolol, Digoxin, Eliquis  Follow-up digoxin level - 0 4

## 2023-01-01 ENCOUNTER — HOME CARE VISIT (OUTPATIENT)
Dept: HOME HEALTH SERVICES | Facility: HOME HEALTHCARE | Age: 78
End: 2023-01-01

## 2023-01-01 ENCOUNTER — HOME CARE VISIT (OUTPATIENT)
Dept: HOME HOSPICE | Facility: HOSPICE | Age: 78
End: 2023-01-01

## 2023-01-01 VITALS
RESPIRATION RATE: 20 BRPM | HEART RATE: 68 BPM | TEMPERATURE: 97.6 F | SYSTOLIC BLOOD PRESSURE: 86 MMHG | DIASTOLIC BLOOD PRESSURE: 50 MMHG

## 2023-01-01 VITALS
RESPIRATION RATE: 16 BRPM | SYSTOLIC BLOOD PRESSURE: 106 MMHG | TEMPERATURE: 97.9 F | HEART RATE: 64 BPM | DIASTOLIC BLOOD PRESSURE: 62 MMHG

## 2023-01-01 VITALS
HEART RATE: 72 BPM | SYSTOLIC BLOOD PRESSURE: 80 MMHG | RESPIRATION RATE: 12 BRPM | OXYGEN SATURATION: 98 % | TEMPERATURE: 96.8 F | DIASTOLIC BLOOD PRESSURE: 46 MMHG

## 2023-03-16 ENCOUNTER — HOME CARE VISIT (OUTPATIENT)
Dept: HOME HEALTH SERVICES | Facility: HOME HEALTHCARE | Age: 78
End: 2023-03-16

## 2023-03-16 ENCOUNTER — HOSPICE ADMISSION (OUTPATIENT)
Dept: HOME HOSPICE | Facility: HOSPICE | Age: 78
End: 2023-03-16

## 2023-03-17 ENCOUNTER — HOME CARE VISIT (OUTPATIENT)
Dept: HOME HEALTH SERVICES | Facility: HOME HEALTHCARE | Age: 78
End: 2023-03-17

## 2023-03-20 ENCOUNTER — HOME CARE VISIT (OUTPATIENT)
Dept: HOME HOSPICE | Facility: HOSPICE | Age: 78
End: 2023-03-20

## 2023-03-21 ENCOUNTER — HOME CARE VISIT (OUTPATIENT)
Dept: HOME HEALTH SERVICES | Facility: HOME HEALTHCARE | Age: 78
End: 2023-03-21

## 2023-03-21 ENCOUNTER — HOME CARE VISIT (OUTPATIENT)
Dept: HOME HOSPICE | Facility: HOSPICE | Age: 78
End: 2023-03-21

## 2023-03-21 VITALS
WEIGHT: 138.5 LBS | BODY MASS INDEX: 26.15 KG/M2 | DIASTOLIC BLOOD PRESSURE: 62 MMHG | HEART RATE: 62 BPM | HEIGHT: 61 IN | RESPIRATION RATE: 16 BRPM | TEMPERATURE: 98.2 F | SYSTOLIC BLOOD PRESSURE: 102 MMHG

## 2023-03-21 VITALS
HEART RATE: 60 BPM | SYSTOLIC BLOOD PRESSURE: 100 MMHG | TEMPERATURE: 97.2 F | DIASTOLIC BLOOD PRESSURE: 60 MMHG | RESPIRATION RATE: 16 BRPM

## 2023-03-22 ENCOUNTER — HOME CARE VISIT (OUTPATIENT)
Dept: HOME HEALTH SERVICES | Facility: HOME HEALTHCARE | Age: 78
End: 2023-03-22

## 2023-03-23 ENCOUNTER — HOME CARE VISIT (OUTPATIENT)
Dept: HOME HOSPICE | Facility: HOSPICE | Age: 78
End: 2023-03-23

## 2023-03-23 ENCOUNTER — HOME CARE VISIT (OUTPATIENT)
Dept: HOME HEALTH SERVICES | Facility: HOME HEALTHCARE | Age: 78
End: 2023-03-23

## 2023-03-28 ENCOUNTER — HOME CARE VISIT (OUTPATIENT)
Dept: HOME HEALTH SERVICES | Facility: HOME HEALTHCARE | Age: 78
End: 2023-03-28

## 2023-03-28 NOTE — ASSESSMENT & PLAN NOTE
Wt Readings from Last 3 Encounters:   10/14/19 80 3 kg (177 lb)   09/30/19 88 3 kg (194 lb 10 7 oz)   07/18/19 83 5 kg (184 lb)     · Acute on chronic diastolic congestive heart failure on admission  Patient is now euvolemic and restarted on her home dose of Lasix  · Echo shows an EF of 45%, study was technically difficult to evaluate for diastolic dysfunction secondary to tachycardia  · CXR showing cardiomegaly  Pro-BNP elevated 12k  Troponin <0 02   · Patient developed acute kidney injury while on aggressive diuresis, nephrology consulted for management  Her renal functions are now back to baseline, and cleared by Nephrology to restart 40 mg Lasix daily  She will need a BMP in 2-3 days outpatient  · Cardiology input appreciated, right heart catheterization 10/8 noted  · Daily weights, strict I/Os, low sodium diet  · Patient will also need close follow up with Cardiology in 1 week  PAST MEDICAL HISTORY:  Hyperlipidemia     Polyp of corpus uteri

## 2023-03-29 ENCOUNTER — HOME CARE VISIT (OUTPATIENT)
Dept: HOME HEALTH SERVICES | Facility: HOME HEALTHCARE | Age: 78
End: 2023-03-29

## 2023-03-30 ENCOUNTER — HOME CARE VISIT (OUTPATIENT)
Dept: HOME HEALTH SERVICES | Facility: HOME HEALTHCARE | Age: 78
End: 2023-03-30

## 2023-03-31 ENCOUNTER — HOME CARE VISIT (OUTPATIENT)
Dept: HOME HEALTH SERVICES | Facility: HOME HEALTHCARE | Age: 78
End: 2023-03-31

## 2023-03-31 VITALS
TEMPERATURE: 98 F | DIASTOLIC BLOOD PRESSURE: 66 MMHG | HEART RATE: 60 BPM | SYSTOLIC BLOOD PRESSURE: 112 MMHG | RESPIRATION RATE: 16 BRPM

## 2023-04-01 VITALS
DIASTOLIC BLOOD PRESSURE: 70 MMHG | HEART RATE: 68 BPM | RESPIRATION RATE: 16 BRPM | TEMPERATURE: 98 F | SYSTOLIC BLOOD PRESSURE: 108 MMHG

## 2023-04-03 ENCOUNTER — HOME CARE VISIT (OUTPATIENT)
Dept: HOME HEALTH SERVICES | Facility: HOME HEALTHCARE | Age: 78
End: 2023-04-03

## 2023-04-04 ENCOUNTER — HOME CARE VISIT (OUTPATIENT)
Dept: HOME HOSPICE | Facility: HOSPICE | Age: 78
End: 2023-04-04

## 2023-04-04 ENCOUNTER — HOME CARE VISIT (OUTPATIENT)
Dept: HOME HEALTH SERVICES | Facility: HOME HEALTHCARE | Age: 78
End: 2023-04-04

## 2023-04-07 ENCOUNTER — HOME CARE VISIT (OUTPATIENT)
Dept: HOME HEALTH SERVICES | Facility: HOME HEALTHCARE | Age: 78
End: 2023-04-07

## 2023-04-14 NOTE — CASE MANAGEMENT
Continued Stay Review    Date: 4/17    Vital Signs: /79 (BP Location: Left arm)   Pulse 82   Temp 97 5 °F (36 4 °C) (Oral)   Resp 18   Ht 5' (1 524 m)   Wt 84 6 kg (186 lb 8 2 oz)   SpO2 95%   BMI 36 43 kg/m²     Medications:   Scheduled Meds:   Current Facility-Administered Medications:  acetaminophen 488 mg Oral Q6H PRN Augustin Herbert MD   aspirin 81 mg Oral Daily Augustin Herbert MD   atorvastatin 10 mg Oral Daily Augustin Herbert MD   budesonide-formoterol 2 puff Inhalation BID Augustin Herbert MD   buPROPion 150 mg Oral Daily Augustin Herbert MD   carBAMazepine 100 mg Oral TID Augustin Herbert MD   cholecalciferol 2,000 Units Oral Daily Augustin Herbert MD   diltiazem 120 mg Oral Q12H Jean Paul Smith MD   divalproex sodium 500 mg Oral Daily Augustin Herbert MD   enoxaparin 30 mg Subcutaneous Q24H [address] TANVI Willis   fenofibrate 48 mg Oral Daily Augustin Herbert MD   ferrous sulfate 325 mg Oral BID Augustin Herbert MD   furosemide 40 mg Oral BID (diuretic) Ramiro Carrillo MD   levalbuterol 1 25 mg Nebulization Q6H PRN Augustin Herbert MD   levothyroxine 88 mcg Oral QAM Augustin Herbert MD   metoprolol 5 mg Intravenous Q6H PRN Augustin Herbert MD   metoprolol tartrate 50 mg Oral Q12H [address] Augustin Herbert MD   ondansetron 4 mg Intravenous Q6H PRN Augustin Herbert MD   senna-docusate sodium 1 tablet Oral Daily PRN Augustin Herbert MD   warfarin 5 mg Oral Daily (warfarin) TANVI Barksdale     Continuous Infusions:    PRN Meds:   acetaminophen    levalbuterol    metoprolol    ondansetron    senna-docusate sodium    Abnormal Labs/Diagnostic Results: CMP:  Results from last 7 days  Lab Units 04/16/18  0549 04/15/18  1605 04/14/18  0517 04/13/18 2022   SODIUM mmol/L 143 142 145 146*   POTASSIUM mmol/L 3 4* 3 7 3 5 3 7   CHLORIDE mmol/L 105 103 110* 108   CO2 mmol/L 32 31 30 30   ANION GAP mmol/L 6 8 5 8   BUN mg/dL 25 25 24 25 CREATININE mg/dL 1 50* 1 51* 1 54* 1 71*   GLUCOSE RANDOM mg/dL 99 100 101 125   CALCIUM mg/dL 9 1 9 8 9 0 9 2   AST U/L  --   --   --  18   ALT U/L  --   --   --  20   ALK PHOS U/L  --   --   --  57   TOTAL PROTEIN g/dL  --   --   --  7 2   BILIRUBIN TOTAL mg/dL  --   --   --  0 30   EGFR ml/min/1 73sq m 35 35 34 30   Results from last 7 days  Lab Units 04/16/18  0549 04/15/18  1605 04/14/18  0517   SODIUM mmol/L 143 142 145   POTASSIUM mmol/L 3 4* 3 7 3 5   CHLORIDE mmol/L 105 103 110*   CO2 mmol/L 32 31 30   BUN mg/dL 25 25 24   CREATININE mg/dL 1 50* 1 51* 1 54*   GLUCOSE RANDOM mg/dL 99 100 101   CALCIUM mg/dL 9 1 9 8 9 0            Age/Sex: 71 y o  female   Assessment/Plan:  note from  4/16      * Atrial flutter (HCC)   Assessment & Plan     · Patient initially had atrial flutter/atrial fibrillation with rapid ventricle response in the emergency room  Rapid ventricle response had resolved with Cardizem  Patient was just diagnosed with atrial flutter/atrial fibrillation last month here at [facility]  · Discussion with Cardiology patient transition to long-acting Cardizem yesterday and oral Lasix   · Per discussion with Cardiology stable from their perspective for discharge  · Discussion with daughter patient is typically seen by a cardiologist and [place] however patient is been relocated to [place] and daughter would like a cardiologist closer  Discussion with Dr Juan M Mancera he will follow with this patient as an outpatient  · Continue with Coumadin  Patient's cardiologist in [place] was recommending other agents then Coumadin will defer this to Cardiology as an outpatient  · Patient will need a follow-up in the next 1-2 weeks with her cardiologist patient does not have a cardiologist she is recommended to follow up with Dr Juan M Mancera  · TSH was normal last March 22nd of this year    · Cardiology further felt patient did have an acute on chronic diastolic CHF exacerbation requiring IV diuretics with transition to oral Lasix           Stage 3 chronic kidney disease   Assessment & Plan     · Review of patient's BMPs, revealed that the last 1 was 1 48   · Presently, it is 1 50   · Monitor patient's kidney function  · Avoid nephrotoxins  · Avoid hypotension           Hypernatremia   Assessment & Plan     · Present on admission mild  · Resolved          CHF (congestive heart failure) ([facility])   Assessment & Plan     · Acute on Chronic diastolic congestive heart failure requiring IV diuresis patient responded well transition and was transition to oral Lasix today  · Continue cardiovascular and heart failure medications  · Creatinine showed initial improvement and has remained stable          COPD (chronic obstructive pulmonary disease) ([facility])   Assessment & Plan     · No exacerbation  · Continue p r n  inhaler           Bilateral leg edema   Assessment & Plan     · Patient feels a tightness at the right lower extremity since her last admission here  · Venous duplex negative for acute DVT  · Overall clinically improved           Dementia   Assessment & Plan     · Patient has short-term memory loss  · Supportive care                 VTE Pharmacologic Prophylaxis:   Pharmacologic: Warfarin (Coumadin) bridge with Lovenox due to subtherapeutic INR  Mechanical VTE Prophylaxis in Place: Yes     Patient Centered Rounds: I have performed bedside rounds with nursing staff today      Discussions with Specialists or Other Care Team Provider:  Cardiology     Education and Discussions with Family / Patient:  Daughter     Time Spent for Care: 35 minutes    More than 50% of total time spent on counseling and coordination of care as described above      Current Length of Stay: 3 day(s)     Current Patient Status: Inpatient   Certification Statement: The patient will continue to require additional inpatient hospital stay due to Pending PT evaluation safe discharge plan     Discharge Plan:  Likely later today or tomorrow           Cardiology note 4/16  ASSESSMENT & PLAN   1  Atrial flutter:  Continue Cardizem for rate control, continue Lopressor for rate control, continue Coumadin for anticoagulation  HR improved      2  Acute on chronic diastolic Congestive heart failure:  Euvolemic  Echo from December 2017 shows EF 55-65%, no regional wall motion abnormalities, grade 1 diastolic dysfunction  Switched to p o  Lasix yesterday  Continue p o  Lasix  Continue Lopressor  Low-salt diet, daily weights, I/O      3   CAD S/P PCI:   -no anginal symptoms at this time   Continue aspirin, statin, beta-blocker      4   Hypertension:  Stable, continue present regimen      5   Hyperlipidemia:  Continue statin  14-Apr-2023 19:18

## 2023-04-18 ENCOUNTER — HOME CARE VISIT (OUTPATIENT)
Dept: HOME HOSPICE | Facility: HOSPICE | Age: 78
End: 2023-04-18

## 2023-04-24 ENCOUNTER — HOME CARE VISIT (OUTPATIENT)
Dept: HOME HEALTH SERVICES | Facility: HOME HEALTHCARE | Age: 78
End: 2023-04-24

## 2023-04-25 ENCOUNTER — HOME CARE VISIT (OUTPATIENT)
Dept: HOME HEALTH SERVICES | Facility: HOME HEALTHCARE | Age: 78
End: 2023-04-25

## 2023-04-25 VITALS
RESPIRATION RATE: 16 BRPM | SYSTOLIC BLOOD PRESSURE: 112 MMHG | TEMPERATURE: 97.3 F | DIASTOLIC BLOOD PRESSURE: 60 MMHG | HEART RATE: 66 BPM

## 2023-04-27 ENCOUNTER — HOME CARE VISIT (OUTPATIENT)
Dept: HOME HEALTH SERVICES | Facility: HOME HEALTHCARE | Age: 78
End: 2023-04-27

## 2023-04-28 ENCOUNTER — HOME CARE VISIT (OUTPATIENT)
Dept: HOME HOSPICE | Facility: HOSPICE | Age: 78
End: 2023-04-28

## 2023-05-01 ENCOUNTER — HOME CARE VISIT (OUTPATIENT)
Dept: HOME HEALTH SERVICES | Facility: HOME HEALTHCARE | Age: 78
End: 2023-05-01

## 2023-05-03 ENCOUNTER — HOME CARE VISIT (OUTPATIENT)
Dept: HOME HEALTH SERVICES | Facility: HOME HEALTHCARE | Age: 78
End: 2023-05-03

## 2023-05-03 ENCOUNTER — HOME CARE VISIT (OUTPATIENT)
Dept: HOME HOSPICE | Facility: HOSPICE | Age: 78
End: 2023-05-03

## 2023-05-04 ENCOUNTER — HOME CARE VISIT (OUTPATIENT)
Dept: HOME HEALTH SERVICES | Facility: HOME HEALTHCARE | Age: 78
End: 2023-05-04

## 2023-05-06 ENCOUNTER — HOME CARE VISIT (OUTPATIENT)
Dept: HOME HOSPICE | Facility: HOSPICE | Age: 78
End: 2023-05-06

## 2023-05-17 ENCOUNTER — HOME CARE VISIT (OUTPATIENT)
Dept: HOME HOSPICE | Facility: HOSPICE | Age: 78
End: 2023-05-17

## 2023-05-18 ENCOUNTER — HOME CARE VISIT (OUTPATIENT)
Dept: HOME HOSPICE | Facility: HOSPICE | Age: 78
End: 2023-05-18

## 2023-08-24 NOTE — PROGRESS NOTES
General Cardiology   Progress Note   Sherryle Righter 71 y o  female MRN: [id number]  Unit/Bed#: -01 Encounter: [id number]      SUBJECTIVE:   Main complaint is fatigue  HR still elevated  Denies chest pain, SOB, lightheadedness, palpitations, leg swelling, syncope  OBJECTIVE:   Vitals:  Vitals:    05/09/18 1105   BP: (!) 179/100   Pulse: 95   Resp: 18   Temp:    SpO2: 97%     Body mass index is 34 27 kg/m²  Systolic (47PSD), HWB:041 , Min:100 , SYV:012     Diastolic (41XDB), HQO:05, Min:59, Max:82      Intake/Output Summary (Last 24 hours) at 05/09/18 1045  Last data filed at 05/09/18 1001   Gross per 24 hour   Intake              720 ml   Output             1225 ml   Net             -505 ml     Weight (last 2 days)     Date/Time   Weight    05/09/18 0550  85 (187 39)    05/08/18 0600  82 (180 78)    05/07/18 1418  81 6 (179 9)    05/07/18 1002  86 6 (190 92)              Telemetry Review: A Fib with RVR    PHYSICAL EXAMS:  General:  Patient is not in acute distress, laying in the bed comfortably, awake, alert responding to commands  Head: Normocephalic, Atraumatic  HEENT:  Both pupils normal-size atraumatic, normocephalic, nonicteric  Neck:  JVP not raised  Trachea central  Respiratory:  Bronchovascular breathing all over the chest without any accompaniment  Cardiovascular:  +irregularly irregular  +tachycardic  No murmurs, rubs, gallops  GI:  Abdomen soft nontender   Liver and spleen normal size  Lymphatic:  No cervical or inguinal lymphadenopathy  Neurologic:  Patient is awake alert, responding to command, well-oriented to time and place and person moving     LABORATORY RESULTS:    Results from last 7 days  Lab Units 05/07/18  1032   TROPONIN I ng/mL <0 02       CBC with diff:   Results from last 7 days  Lab Units 05/09/18  0525 05/08/18  0546 05/07/18  1032   WBC Thousand/uL 5 48 6 49 5 63   HEMOGLOBIN g/dL 11 3* 11 4* 11 3*   HEMATOCRIT % 35 1 35 4 35 2   MCV fL 103* 104* 105*   PLATELETS Thousands/uL 150 153 163   MCH pg 33 0 33 3 33 6   MCHC g/dL 32 2 32 2 32 1   RDW % 12 7 13 1 12 9   MPV fL 11 2 11 8 11 9   NRBC AUTO /100 WBCs  --   --  0       CMP:  Results from last 7 days  Lab Units 18  0525 18  0546 18  1032   SODIUM mmol/L 142 144 146*   POTASSIUM mmol/L 3 7 4 2 3 6   CHLORIDE mmol/L 105 107 108   CO2 mmol/L 28 29 32   ANION GAP mmol/L 9 8 6   BUN mg/dL 30* 34* 32*   CREATININE mg/dL 1 61* 1 86* 1 95*   GLUCOSE RANDOM mg/dL 129 111 187*   CALCIUM mg/dL 9 0 9 3 9 2   AST U/L  --   --  25   ALT U/L  --   --  34   ALK PHOS U/L  --   --  51   TOTAL PROTEIN g/dL  --   --  7 1   BILIRUBIN TOTAL mg/dL  --   --  0 50   EGFR ml/min/1 73sq m 32 27 25       BMP:  Results from last 7 days  Lab Units 18  0525 18  0546 18  1032   SODIUM mmol/L 142 144 146*   POTASSIUM mmol/L 3 7 4 2 3 6   CHLORIDE mmol/L 105 107 108   CO2 mmol/L 28 29 32   BUN mg/dL 30* 34* 32*   CREATININE mg/dL 1 61* 1 86* 1 95*   GLUCOSE RANDOM mg/dL 129 111 187*   CALCIUM mg/dL 9 0 9 3 9 2         Results from last 7 days  Lab Units 18  1032   NT-PRO BNP pg/mL 3,327*                    Results from last 7 days  Lab Units 18  0435 18  0546 18  1032   INR  2 07* 1 82* 1 80*       Lipid Profile:   No results found for: CHOL  No results found for: HDL  No results found for: LDLCALC  No results found for: TRIG    Cardiac testing:  Results for orders placed during the hospital encounter of 12/10/17   Echo complete with contrast if indicated    Narrative [address] [place] [id number] [phone]    Transthoracic Echocardiogram  2D, M-mode, Doppler, and Color Doppler    Study date:  11-Dec-2017    Patient: Christine Chau  MR number: [id number]  Account number: [de-identified]  : 1945  Age: 71 years  Gender: Female  Status: Inpatient  Location: Bedside  Height: 60 in  Weight: 192 lb  BP: 99/ 50 mmHg    Indications: Shortness of breath  Diagnoses: R06 00 - Dyspnea, unspecified, R06 02 - Shortness of breath    Sonographer:  Eileen Polo  Interpreting Physician:  Con Oscar MD  Referring Physician:  Melba Chawla PA-C  Group:  [facility]    SUMMARY    LEFT VENTRICLE:  Systolic function was normal  Ejection fraction was estimated in the range of 55 % to 65 %  There were no regional wall motion abnormalities  There was mild concentric hypertrophy  Doppler parameters were consistent with abnormal left ventricular relaxation (grade 1 diastolic dysfunction)  MITRAL VALVE:  There was mild annular calcification  There was mild regurgitation  AORTIC VALVE:  There was mild regurgitation  TRICUSPID VALVE:  There was mild regurgitation  PULMONIC VALVE:  There was mild regurgitation  HISTORY: PRIOR HISTORY: Elevated trponin, Dementia, Seizures, Stroke, Myocardial infarction  Congestive heart failure  Risk factors: hypertension and hypercholesterolemia  Chronic lung disease  PROCEDURE: The procedure was performed at the bedside  This was a routine study  The transthoracic approach was used  The study included complete 2D imaging, M-mode, complete spectral Doppler, and color Doppler  The heart rate was 68 bpm,  at the start of the study  Images were obtained from the parasternal, apical, subcostal, and suprasternal notch acoustic windows  Echocardiographic views were limited due to poor acoustic window availability, decreased penetration, and  lung interference  This was a technically difficult study  LEFT VENTRICLE: Size was normal  Systolic function was normal  Ejection fraction was estimated in the range of 55 % to 65 %  There were no regional wall motion abnormalities  Wall thickness was normal  There was mild concentric  hypertrophy  DOPPLER: Doppler parameters were consistent with abnormal left ventricular relaxation (grade 1 diastolic dysfunction)      RIGHT VENTRICLE: The size was normal  Systolic function was normal  Wall thickness was normal     LEFT ATRIUM: Size was normal     RIGHT ATRIUM: Size was normal     MITRAL VALVE: There was mild annular calcification  Valve structure was normal  There was normal leaflet separation  DOPPLER: The transmitral velocity was within the normal range  There was no evidence for stenosis  There was mild  regurgitation  AORTIC VALVE: The valve was trileaflet  Leaflets exhibited normal thickness and normal cuspal separation  DOPPLER: Transaortic velocity was within the normal range  There was no evidence for stenosis  There was mild regurgitation  TRICUSPID VALVE: The valve structure was normal  There was normal leaflet separation  DOPPLER: The transtricuspid velocity was within the normal range  There was no evidence for stenosis  There was mild regurgitation  PULMONIC VALVE: Leaflets exhibited normal thickness, no calcification, and normal cuspal separation  DOPPLER: The transpulmonic velocity was within the normal range  There was mild regurgitation  PERICARDIUM: There was no pericardial effusion  The pericardium was normal in appearance  AORTA: The root exhibited normal size      SYSTEM MEASUREMENT TABLES    2D  %FS: 29 2 %  Ao Diam: 3 1 cm  EDV(Teich): 140 8 ml  EF(Teich): 55 6 %  ESV(Teich): 62 5 ml  IVSd: 1 1 cm  LA Area: 20 9 cm2  LA Diam: 3 7 cm  LVEDV MOD A4C: 117 1 ml  LVEF MOD A4C: 50 3 %  LVESV MOD A4C: 58 2 ml  LVIDd: 5 4 cm  LVIDs: 3 8 cm  LVLd A4C: 8 5 cm  LVLs A4C: 7 1 cm  LVPWd: 1 1 cm  RA Area: 17 5 cm2  RVIDd: 3 2 cm  SV MOD A4C: 59 ml  SV(Teich): 78 3 ml    CW  AR Dec [place]: 1 9 m/s2  AR Dec Time: 2208 1 ms  AR PHT: 640 4 ms  AR Vmax: 4 1 m/s  AR maxP 4 mmHg  TR Vmax: 2 5 m/s  TR maxP mmHg    MM  TAPSE: 1 4 cm    PW  AVC: 389 7 ms  E': 0 1 m/s  E/E': 15 3  MV A An[place]: 1 m/s  MV Dec [place]: 3 4 m/s2  MV DecT: 240 3 ms  MV E Anoop: 0 8 m/s  MV E/A Ratio: 0 8  MV PHT: 69 7 ms  MVA By PHT: 3 2 cm2    [employer] Accredited Echocardiography Laboratory    Prepared and electronically signed by    Bernie Goss MD  Signed 11-Dec-2017 12:09:00         Meds/Allergies   all current active meds have been reviewed and current meds:   Current Facility-Administered Medications   Medication Dose Route Frequency    acetaminophen (TYLENOL) tablet 488 mg  488 mg Oral Q6H PRN    albuterol (PROVENTIL HFA,VENTOLIN HFA) inhaler 2 puff  2 puff Inhalation Q6H PRN    albuterol inhalation solution 2 5 mg  2 5 mg Nebulization Q6H PRN    aspirin (ECOTRIN LOW STRENGTH) EC tablet 81 mg  81 mg Oral Daily    atorvastatin (LIPITOR) tablet 10 mg  10 mg Oral Daily    buPROPion (WELLBUTRIN SR) 12 hr tablet 150 mg  150 mg Oral Daily    carBAMazepine (TEGretol) chewable tablet 100 mg  100 mg Oral TID    diltiazem (CARDIZEM SR) 12 hr capsule 180 mg  180 mg Oral Q12H [address]    divalproex sodium (DEPAKOTE ER) 24 hr tablet 250 mg  250 mg Oral Daily    ferrous sulfate tablet 325 mg  325 mg Oral BID    fluticasone-salmeterol (ADVAIR) 100-50 mcg/dose inhaler 1 puff  1 puff Inhalation Q12H [address]    furosemide (LASIX) tablet 60 mg  60 mg Oral Daily    heparin (porcine) subcutaneous injection 5,000 Units  5,000 Units Subcutaneous Q8H [address]    levothyroxine tablet 88 mcg  88 mcg Oral Early Morning    metoprolol (LOPRESSOR) injection 5 mg  5 mg Intravenous Q6H PRN    metoprolol tartrate (LOPRESSOR) tablet 50 mg  50 mg Oral Q6H    nitroglycerin (NITROSTAT) SL tablet 0 4 mg  0 4 mg Sublingual Q5 Min PRN    potassium chloride (K-DUR,KLOR-CON) CR tablet 20 mEq  20 mEq Oral Daily    senna-docusate sodium (SENOKOT S) 8 6-50 mg per tablet 1 tablet  1 tablet Oral HS    warfarin (COUMADIN) tablet 2 5 mg  2 5 mg Oral Daily (warfarin)     Prescriptions Prior to Admission   Medication    acetaminophen (TYLENOL) 500 mg tablet    albuterol (2 5 mg/3 mL) 0 083 % nebulizer solution    albuterol (PROVENTIL HFA,VENTOLIN HFA) 90 mcg/act inhaler    aspirin (ECOTRIN LOW STRENGTH) 81 mg EC tablet    atorvastatin (LIPITOR) 10 mg tablet    buPROPion (WELLBUTRIN SR) 150 mg 12 hr tablet    carBAMazepine (TEGretol) 100 mg chewable tablet    Cholecalciferol (VITAMIN D) 2000 units CAPS    diltiazem (CARDIZEM SR) 120 mg 12 hr capsule    divalproex sodium (DEPAKOTE ER) 250 mg 24 hr tablet    fenofibrate micronized (LOFIBRA) 67 MG capsule    ferrous sulfate 325 (65 Fe) mg tablet    fluticasone furoate-vilanterol (BREO ELLIPTA)    furosemide (LASIX) 40 mg tablet    Levothyroxine Sodium 88 MCG CAPS    metoprolol tartrate (LOPRESSOR) 50 mg tablet    potassium chloride (K-DUR,KLOR-CON) 20 mEq tablet    Sennosides-Docusate Sodium (SENNA PLUS PO)    warfarin (COUMADIN) 2 mg tablet          ASSESSMENT & PLAN   1   Atrial flutter with RVR:  HR still elevated  Continue Lopressor to 50 mg Q6   Will increase Cardizem to 180 mg BID  Continue Coumadin for anticoagulation      2   Chronic diastolic Congestive heart failure:  -BNP 3327  CXR shows stable cardiomegaly  -[facility] December 2017 shows EF 55-65%, no regional wall motion abnormalities, grade 1 diastolic dysfunction  -currently euvolemic, Not currently in exacerbation   -creatinine 1 61, improved   Continue lasix at present dose   -continue Lopressor   -low-salt diet, daily weights, I/O      3   CAD S/P PCI:  No anginal symptoms at this time   Continue aspirin, statin, Lopressor      4   Hypertension:  /100  Will increase cardizem to 180 mg BID given A Fib with RVR      5   Hyperlipidemia:  Continue statin  iDallo Hall PA-C  5/9/2018,10:45 AM    Portions of the record may have been created with voice recognition software   Occasional wrong word or "sound a like" substitutions may have occurred due to the inherent limitations of voice recognition software   Read the chart carefully and recognize, using context, where substitutions have occurred  No

## 2023-11-18 NOTE — ASSESSMENT & PLAN NOTE
· CKD stage 3 with a baseline creatinine of 1 5  · Patient appears to have an acute kidney injury on CKD  · Patient was given a gentle bolus of 500 cc in the emergency department  · Lasix being titrated  · Patient has single kidney with chronic indwelling baca catheter, due to be changed on Friday per daughter  Baca was changed in anticipation of discharge    · Attending Only I have personally provided the amount of critical care time documented below excluding time spent on separate procedures.

## 2023-11-23 NOTE — OP NOTE
ATTENDING PHYSICIAN NOTE    Patient : Sherie Bullard Age: 27 year old Sex: female   MRN: [id number] Encounter Date: 11/22/2023    HISTORY     CC: See triage note.    HISTORY OF PRESENT ILLNESS:  HPI   27-year-old female with past medical history significant for PCOS presents to the emergency department for evaluation of abdominal pain. Patient reports intermittent abdominal pain followed by intermittent dark stools for 1.5 weeks. She endorses three discrete episodes of pain since last Sunday.  She says she last had abdominal pain around 2 days ago, no current pain, but has been having the dark stools for the past few days. States she has been taking OTC bismuthl for pain.  Denies vomiting, gross hematochezia, urinary symptoms, fever, chills, back pain.    I have reviewed pertinent medical records available to me, including records from other institutions.    History obtained from patient.    ALLERGIES:  Reviewed as documented per medical record.    PAST MEDICAL HISTORY:  Pertinent medical history as documented per HPI.    PAST SURGICAL HISTORY:  Pertinent surgical history as documented per HPI.    FAMILY HISTORY:  Pertinent family history as documented per HPI.    SOCIAL HISTORY:  Unremarkable unless documented within HPI.    REVIEW OF SYSTEMS:  Review of Systems   Constitutional: Negative for chills, fatigue and fever.   HENT: Negative for ear pain, rhinorrhea and sore throat.    Eyes: Negative for pain and visual disturbance.   Respiratory: Negative for cough and shortness of breath.    Cardiovascular: Negative for chest pain and leg swelling.   Gastrointestinal: Positive for abdominal pain. Negative for constipation and vomiting.   Genitourinary: Negative for dysuria, flank pain and hematuria.   Musculoskeletal: Negative for arthralgias, back pain and myalgias.   Skin: Negative for rash.   Neurological: Negative for dizziness, weakness, light-headedness, numbness and headaches.       PHYSICAL EXAM    ED Triage  Operative Note     PATIENT:  Wm Warner (MRN [id number])    DATE OF PROCEDURE:   6/19/2021    PRE-OP DIAGNOSES:   1) gross hematuria clot urinary retention  2  Neurogenic bladder, chronic indwelling Davis catheter      POST-OP DIAGNOSES AND OPERATIVE FINDINGS:   1) gross hematuria clot urinary retention  2  Neurogenic bladder, chronic indwelling Davis catheter   3) bladder tumor right anterolateral wall, 2 cm    PROCEDURES:  1) cystoscopy with evacuation of clot  2  Biopsy of bladder tumor  3  Fulguration of bleeding points     SURGEON:   Carlos Duarte MD    ANESTHESIA TYPE:  General anesthesia    ESTIMATED BLOOD LOSS:   Minimal    COMPLICATIONS:   None    ANTIBIOTICS:  Cefazolin    INTRAOPERATIVE THROMBOEMBOLISM PROPHYLAXIS:  Pneumatic compression stockings      PROCEDURE SUMMARY:    The patient was brought to the operating room and anesthesia obtained  The patient was then placed in the lithotomy position and prepped and draped using standard sterile technique  All pressure points were carefully padded  A surgical time-out occurred, antibiotics were administered, and thromboembolism prophylaxis was given    The urethra and bladder were carefully inspected  A large well-organized clot burden  This required a formal clot evacuation  This was performed through the cystoscope using multiple cycles of the [facility] evacuator  Approximately 300-400 cc of clot was evacuated  The bladder was inspected    Cystoscopy findings:    Urethra:  Normal  Bladder:  Lesion identified, characteristics below  Ureteral orifices: Normal    Urachus:   Normal    Lesion characteristics:  -Appearance:    Nodular  -Number of foci:   1  -Aggregate tumor diameter:  2 cm  -Largest individual tumor:  2 cm  -Location:    Right anterolateral wall  -Clinical stage:   T1    Due to patient's ongoing anticoagulation, I do not perform our formal trans urethral resection    Instead the tumor was biopsied in multiple locations in cold Vitals [11/22/23 1819]   ED Triage Vitals Group      Temp 97.5 °F (36.4 °C)      Heart Rate 69      Resp 18      BP (!) 146/98      SpO2 100 %      EtCO2 mmHg       Height 5' 10\" (1.778 m)      Weight 230 lb (104.3 kg)      Weight Scale Used       BMI (Calculated) 33      IBW/kg (Calculated) 68.5       Physical Exam  Constitutional:       General: She is not in acute distress.     Appearance: Normal appearance.   Cardiovascular:      Rate and Rhythm: Normal rate.   Pulmonary:      Effort: Pulmonary effort is normal.   Abdominal:      Palpations: Abdomen is soft.      Tenderness: There is no abdominal tenderness.      Comments: Benign exam, no right upper quadrant tenderness, no epigastric pain.  No peritoneal signs.   Musculoskeletal:         General: Normal range of motion.   Skin:     General: Skin is warm and dry.   Neurological:      General: No focal deficit present.      Mental Status: She is alert.         RESULTS    LAB RESULTS:  Results for orders placed or performed during the hospital encounter of 11/22/23   Comprehensive Metabolic Panel   Result Value Ref Range    Fasting Status      Sodium 138 135 - 145 mmol/L    Potassium 3.9 3.4 - 5.1 mmol/L    Chloride 108 97 - 110 mmol/L    Carbon Dioxide 24 21 - 32 mmol/L    Anion Gap 10 7 - 19 mmol/L    Glucose 94 70 - 99 mg/dL    BUN 10 6 - 20 mg/dL    Creatinine 0.59 0.51 - 0.95 mg/dL    Glomerular Filtration Rate >90 >=60    BUN/Cr 17 7 - 25    Calcium 8.4 8.4 - 10.2 mg/dL    Bilirubin, Total 0.3 0.2 - 1.0 mg/dL    GOT/AST 13 <=37 Units/L    GPT/ALT 15 <64 Units/L    Alkaline Phosphatase 100 45 - 117 Units/L    Albumin 3.4 (L) 3.6 - 5.1 g/dL    Protein, Total 7.9 6.4 - 8.2 g/dL    Globulin 4.5 (H) 2.0 - 4.0 g/dL    A/G Ratio 0.8 (L) 1.0 - 2.4   Beta HCG Quantitative Pregnancy   Result Value Ref Range    HCG, Quantitative <2 <=4 mUnits/mL   Lipase   Result Value Ref Range    Lipase 31 15 - 77 Units/L   CBC with Automated Differential (performable only)   Result  cup device  A Bugbee catheter was then introduced and extensive fulguration was performed  I observed the bladder with multiple cycles of filling and emptying hemostasis was deemed excellent    A 3 way catheter was placed in the procedure the patient was initiated on slow CBI      DISPOSITION:   PACU - hemodynamically stable  PLAN:    - findings at time of surgery discussed the patient's daughter by phone  - will wean CBI  -will follow-up with me as an outpatient    We discussed that the biopsy is positive I would recommend proceeding with a formal trans urethral resection after medical clearance and appropriate with holding of her anticoagulation  - urology will continue to follow while inpatient Value Ref Range    WBC 8.9 4.2 - 11.0 K/mcL    RBC 4.66 4.00 - 5.20 mil/mcL    HGB 11.9 (L) 12.0 - 15.5 g/dL    HCT 38.3 36.0 - 46.5 %    MCV 82.2 78.0 - 100.0 fl    MCH 25.5 (L) 26.0 - 34.0 pg    MCHC 31.1 (L) 32.0 - 36.5 g/dL    RDW-CV 14.2 11.0 - 15.0 %    RDW-SD 42.2 39.0 - 50.0 fL     140 - 450 K/mcL    NRBC 0 <=0 /100 WBC    Neutrophil, Percent 41 %    Lymphocytes, Percent 45 %    Mono, Percent 13 %    Eosinophils, Percent 1 %    Basophils, Percent 0 %    Immature Granulocytes 0 %    Absolute Neutrophils 3.6 1.8 - 7.7 K/mcL    Absolute Lymphocytes 4.0 1.0 - 4.8 K/mcL    Absolute Monocytes 1.2 (H) 0.3 - 0.9 K/mcL    Absolute Eosinophils  0.1 0.0 - 0.5 K/mcL    Absolute Basophils 0.0 0.0 - 0.3 K/mcL    Absolute Immature Granulocytes 0.0 0.0 - 0.2 K/mcL       RADIOLOGY RESULTS:  US LIVER GALLBLADDER PANCREAS (RUQ)   Final Result   1.   No acute sonographic abnormality in the right upper abdomen.   2.   Slight hepatomegaly.      Electronically Signed by: FRAN CORONADO MD    Signed on: 11/22/2023 7:58 PM    Workstation ID: [id number]           ED COURSE    PROCEDURES:  Procedures    REASSESSMENT:  ED Course as of 11/22/23 2032 Wed Nov 22, 2023 1915 Patient resting comfortably at the time of my reassessment.  Abdomen remains benign. [TC]      ED Course User Index  [TC] Noe Beck MD       CLINICAL IMPRESSION:  MDM  I have independently interpreted the patient's radiology studies, awaiting final radiology read.    I have independently reviewed the patient's blood work.    Ultrasound normal, blood work benign.  Rectal exam performed by Dr. Weinstein, negative for blood, black stools may be secondary to OTC bismuth compounds.    She is well-appearing, agreeable to outpatient follow-up with GI, recommend empiric acid reducing medicines.  Stable for discharge.      Visit Vitals  BP (!) 121/93 (BP Location: RUE - Right upper extremity, Patient Position: Sitting/High-Major's)   Pulse 70   Temp 98.4 °F  (36.9 °C) (Tympanic)   Resp 16   Ht 5' 10\" (1.778 m)   Wt 104.3 kg (230 lb)   LMP 11/21/2023   SpO2 99%   BMI 33.00 kg/m²       ED Diagnosis   1. Generalized abdominal pain            DISPOSITION    Discharge 11/22/2023  8:26 PM  Sherie Aleah discharge to home/self care.        Odilia Bella   11/22/2023 8:32 PM     The documentation recorded by the scribe accurately and completely reflects the service(s) I personally performed and the decisions made by me.     Charting performed by ED Scribe Odilia Bella for Dr. Beck.       Noe Beck MD  11/22/23 2032

## 2024-09-27 NOTE — ASSESSMENT & PLAN NOTE
· Hyperlipidemia  Continue Atorvastatin  [No Acute Distress] : no acute distress [Well-Appearing] : well-appearing [No Respiratory Distress] : no respiratory distress  [No Accessory Muscle Use] : no accessory muscle use [Clear to Auscultation] : lungs were clear to auscultation bilaterally [Normal Rate] : normal rate  [Regular Rhythm] : with a regular rhythm [Normal S1, S2] : normal S1 and S2 [No Murmur] : no murmur heard [de-identified] : paraspinal tenderness lumbar area.

## 2024-12-03 NOTE — PROGRESS NOTES
Recommendations on oxygen supplement for diastolic congestive heart failure  Wean patient off oxygen if she tolerates it  Maintain oxygen saturation more than 92%  PT,OT,SLP

## (undated) DEVICE — HF-RESECTION ELECTRODE PLASMALOOP LOOP, MEDIUM, 24 FR., 12°-30°, PK TURIS: Brand: OLYMPUS

## (undated) DEVICE — BAG URINE DRAINAGE 2000ML ANTI RFLX LF

## (undated) DEVICE — IV SET 15 DROP STERILE 0/Y GRAVITY

## (undated) DEVICE — GUIDEWIRE VASC .035 260CM 15CM TAP ST

## (undated) DEVICE — RADIFOCUS GLIDEWIRE: Brand: GLIDEWIRE

## (undated) DEVICE — SPECIMEN CONTAINER STERILE PEEL PACK

## (undated) DEVICE — PACK TUR

## (undated) DEVICE — PINNACLE R/O II INTRODUCER SHEATH WITH RADIOPAQUE MARKER: Brand: PINNACLE

## (undated) DEVICE — PREMIUM DRY TRAY LF: Brand: MEDLINE INDUSTRIES, INC.

## (undated) DEVICE — CATH FOLEY 18FR 5ML 2 WAY SILICONE ELASTIMER

## (undated) DEVICE — EVACUATOR BLADDER ELLIK DISP STRL

## (undated) DEVICE — BASIC SINGLE BASIN 2-LF: Brand: MEDLINE INDUSTRIES, INC.

## (undated) DEVICE — GLOVE SRG BIOGEL ECLIPSE 7.5

## (undated) DEVICE — INVIEW CLEAR LEGGINGS: Brand: CONVERTORS

## (undated) DEVICE — GLOVE SRG BIOGEL 7

## (undated) DEVICE — Device

## (undated) DEVICE — CHLORHEXIDINE 4PCT 4 OZ

## (undated) DEVICE — INTRO SHEATH 6FR .038 30CM CHECK-FLO

## (undated) DEVICE — GLOVE INDICATOR PI UNDERGLOVE SZ 8 BLUE

## (undated) DEVICE — STERILE ICS CARDIOVASCULAR PK: Brand: CARDINAL HEALTH

## (undated) DEVICE — CATH DIAG 5FR .035 65CM 6S OMMI-FLUSH

## (undated) DEVICE — URIMETER 2500ML

## (undated) DEVICE — CATH FOLEY 22FR 5ML 2 WAY SILICONE ELASTIMER

## (undated) DEVICE — GAUZE SPONGES,16 PLY: Brand: CURITY

## (undated) DEVICE — INTRO SHEATH 7FR 30CM J-TIP PERFORMER NON-RADIOLUCENT

## (undated) DEVICE — HF-RESECTION ELECTRODE PLASMABUTTON BUTTON, 24 FR., 12°-30°, PK TURIS: Brand: OLYMPUS

## (undated) DEVICE — GUIDEWIRE WITH ICE™ HYDROPHILIC COATING: Brand: V-18™ CONTROL WIRE™

## (undated) DEVICE — 4 F TEMPO AQUA 0.038  65CM VER: Brand: TEMPO AQUA

## (undated) DEVICE — CATH BAL CHARGER 7 X 60MM X 75CM

## (undated) DEVICE — TUBING SUCTION 5MM X 12 FT

## (undated) DEVICE — INFLATION DEVICE BASIX 30ATM

## (undated) DEVICE — FOGARTY THRU-LUMEN EMBOLECTOMY CATHETER 5.5F 80CM: Brand: FOGARTY

## (undated) DEVICE — LUBRICANT SURGILUBE TUBE 4 OZ  FLIP TOP